# Patient Record
Sex: MALE | Race: WHITE | Employment: OTHER | URBAN - METROPOLITAN AREA
[De-identification: names, ages, dates, MRNs, and addresses within clinical notes are randomized per-mention and may not be internally consistent; named-entity substitution may affect disease eponyms.]

---

## 2017-03-27 ENCOUNTER — HOSPITAL ENCOUNTER (OUTPATIENT)
Dept: CT IMAGING | Age: 72
Discharge: HOME | DRG: 903 | End: 2017-03-27
Attending: INTERNAL MEDICINE
Payer: MEDICARE

## 2017-03-27 VITALS — HEIGHT: 68 IN | BODY MASS INDEX: 21.22 KG/M2 | WEIGHT: 140 LBS

## 2017-03-27 DIAGNOSIS — C20 RECTAL CANCER (HCC): ICD-10-CM

## 2017-03-27 RX ORDER — SODIUM CHLORIDE 0.9 % (FLUSH) 0.9 %
10 SYRINGE (ML) INJECTION
Status: COMPLETED | OUTPATIENT
Start: 2017-03-27 | End: 2017-03-27

## 2017-03-27 RX ADMIN — DIATRIZOATE MEGLUMINE AND DIATRIZOATE SODIUM 15 ML: 660; 100 LIQUID ORAL; RECTAL at 08:46

## 2017-03-27 RX ADMIN — SODIUM CHLORIDE 100 ML: 900 INJECTION, SOLUTION INTRAVENOUS at 08:46

## 2017-03-27 RX ADMIN — IOPAMIDOL 100 ML: 755 INJECTION, SOLUTION INTRAVENOUS at 08:46

## 2017-03-27 RX ADMIN — Medication 10 ML: at 08:45

## 2017-03-28 ENCOUNTER — HOSPITAL ENCOUNTER (OUTPATIENT)
Dept: LAB | Age: 72
Discharge: HOME | DRG: 903 | End: 2017-03-28
Payer: MEDICARE

## 2017-03-28 ENCOUNTER — ANESTHESIA EVENT (OUTPATIENT)
Dept: SURGERY | Age: 72
DRG: 903 | End: 2017-03-28
Payer: MEDICARE

## 2017-03-28 DIAGNOSIS — C20 RECTAL CANCER (HCC): ICD-10-CM

## 2017-03-28 LAB
ALBUMIN SERPL BCP-MCNC: 2.8 G/DL (ref 3.2–4.6)
ALBUMIN/GLOB SERPL: 0.7 {RATIO} (ref 1.2–3.5)
ALP SERPL-CCNC: 94 U/L (ref 50–136)
ALT SERPL-CCNC: 27 U/L (ref 12–65)
ANION GAP BLD CALC-SCNC: 9 MMOL/L (ref 7–16)
AST SERPL W P-5'-P-CCNC: 23 U/L (ref 15–37)
BASOPHILS # BLD AUTO: 0 K/UL (ref 0–0.2)
BASOPHILS # BLD: 1 % (ref 0–2)
BILIRUB SERPL-MCNC: 0.2 MG/DL (ref 0.2–1.1)
BUN SERPL-MCNC: 16 MG/DL (ref 8–23)
CALCIUM SERPL-MCNC: 9 MG/DL (ref 8.3–10.4)
CHLORIDE SERPL-SCNC: 103 MMOL/L (ref 98–107)
CO2 SERPL-SCNC: 26 MMOL/L (ref 23–32)
CREAT SERPL-MCNC: 0.88 MG/DL (ref 0.8–1.5)
DIFFERENTIAL METHOD BLD: ABNORMAL
EOSINOPHIL # BLD: 0.1 K/UL (ref 0–0.8)
EOSINOPHIL NFR BLD: 1 % (ref 0.5–7.8)
ERYTHROCYTE [DISTWIDTH] IN BLOOD BY AUTOMATED COUNT: 14.1 % (ref 11.9–14.6)
GLOBULIN SER CALC-MCNC: 4.2 G/DL (ref 2.3–3.5)
GLUCOSE SERPL-MCNC: 122 MG/DL (ref 65–100)
HCT VFR BLD AUTO: 37.8 % (ref 41.1–50.3)
HGB BLD-MCNC: 11.7 G/DL (ref 13.6–17.2)
LYMPHOCYTES # BLD AUTO: 7 % (ref 13–44)
LYMPHOCYTES # BLD: 0.3 K/UL (ref 0.5–4.6)
MCH RBC QN AUTO: 26.5 PG (ref 26.1–32.9)
MCHC RBC AUTO-ENTMCNC: 31 G/DL (ref 31.4–35)
MCV RBC AUTO: 85.7 FL (ref 79.6–97.8)
MONOCYTES # BLD: 0.6 K/UL (ref 0.1–1.3)
MONOCYTES NFR BLD AUTO: 12 % (ref 4–12)
NEUTS SEG # BLD: 4 K/UL (ref 1.7–8.2)
NEUTS SEG NFR BLD AUTO: 79 % (ref 43–78)
NRBC # BLD: 0 K/UL (ref 0–0.2)
PLATELET # BLD AUTO: 324 K/UL (ref 150–450)
PMV BLD AUTO: 8.3 FL (ref 10.8–14.1)
POTASSIUM SERPL-SCNC: 4.2 MMOL/L (ref 3.5–5.1)
PROT SERPL-MCNC: 7 G/DL (ref 6.3–8.2)
RBC # BLD AUTO: 4.41 M/UL (ref 4.23–5.67)
SODIUM SERPL-SCNC: 138 MMOL/L (ref 136–145)
WBC # BLD AUTO: 5 K/UL (ref 4.3–11.1)

## 2017-03-29 ENCOUNTER — HOSPITAL ENCOUNTER (OUTPATIENT)
Dept: SURGERY | Age: 72
Discharge: HOME | End: 2017-03-29
Attending: SURGERY

## 2017-03-29 VITALS
TEMPERATURE: 97.8 F | DIASTOLIC BLOOD PRESSURE: 72 MMHG | HEART RATE: 94 BPM | SYSTOLIC BLOOD PRESSURE: 118 MMHG | RESPIRATION RATE: 14 BRPM | OXYGEN SATURATION: 95 % | HEIGHT: 68 IN | WEIGHT: 141.5 LBS | BODY MASS INDEX: 21.44 KG/M2

## 2017-03-29 RX ORDER — TAMSULOSIN HYDROCHLORIDE 0.4 MG/1
0.4 CAPSULE ORAL EVERY EVENING
COMMUNITY
End: 2017-09-14

## 2017-03-29 NOTE — PERIOP NOTES
Patient verified name, , and surgery as listed in [facility]. Type II surgery, walk in assessment complete. Labs per surgeon: none  Labs per anesthesia protocol: hgb (pt with recent labs/results [3/28/17] in EMR)  EKG: not required per anesthesia protocol. Hibiclens and instructions given per hospital policy. Patient provided with handouts including Guide to Surgery, Pain Management, Hand Hygiene, Blood Transfusion Education, and [facility] Brochure. Patient answered medical/surgical history questions at their best of ability. All prior to admission medications documented in [facility]. Original medication prescription bottle not visualized during patient appointment. Patient instructed to hold all vitamins 7 days prior to surgery and NSAIDS 5 days prior to surgery, patient verbalized understanding. Medications to be held vitamins/iron    Patient instructed to continue previous medications as prescribed prior to surgery and to take the following medications the day of surgery according to anesthesia guidelines with a small sip of water: levothryoxine. Patient taught back and verbalized understanding.

## 2017-03-30 ENCOUNTER — HOSPITAL ENCOUNTER (INPATIENT)
Age: 72
LOS: 7 days | Discharge: HOME | DRG: 903 | End: 2017-04-06
Attending: SURGERY | Admitting: SURGERY
Payer: MEDICARE

## 2017-03-30 ENCOUNTER — ANESTHESIA (OUTPATIENT)
Dept: SURGERY | Age: 72
DRG: 903 | End: 2017-03-30
Payer: MEDICARE

## 2017-03-30 DIAGNOSIS — C20 RECTAL CANCER (HCC): Primary | ICD-10-CM

## 2017-03-30 DIAGNOSIS — T85.79XS INFECTED PROSTHETIC MESH OF ABDOMINAL WALL, SEQUELA: ICD-10-CM

## 2017-03-30 PROBLEM — T85.79XA: Status: ACTIVE | Noted: 2017-03-30

## 2017-03-30 PROCEDURE — 77030032490 HC SLV COMPR SCD KNE COVD -B

## 2017-03-30 PROCEDURE — 77030008477 HC STYL SATN SLP COVD -A: Performed by: ANESTHESIOLOGY

## 2017-03-30 PROCEDURE — 77030034849: Performed by: SURGERY

## 2017-03-30 PROCEDURE — 76060000037 HC ANESTHESIA 3 TO 3.5 HR: Performed by: SURGERY

## 2017-03-30 PROCEDURE — 77030013567 HC DRN WND RESERV BARD -A: Performed by: SURGERY

## 2017-03-30 PROCEDURE — 74011250636 HC RX REV CODE- 250/636

## 2017-03-30 PROCEDURE — 0WPF0JZ REMOVAL OF SYNTHETIC SUBSTITUTE FROM ABDOMINAL WALL, OPEN APPROACH: ICD-10-PCS | Performed by: SURGERY

## 2017-03-30 PROCEDURE — 74011250636 HC RX REV CODE- 250/636: Performed by: ANESTHESIOLOGY

## 2017-03-30 PROCEDURE — 77030008703 HC TU ET UNCUF COVD -A: Performed by: ANESTHESIOLOGY

## 2017-03-30 PROCEDURE — 74011250637 HC RX REV CODE- 250/637: Performed by: SURGERY

## 2017-03-30 PROCEDURE — 77030031139 HC SUT VCRL2 J&J -A: Performed by: SURGERY

## 2017-03-30 PROCEDURE — 77030002986 HC SUT PROL J&J -A: Performed by: SURGERY

## 2017-03-30 PROCEDURE — 77030018836 HC SOL IRR NACL ICUM -A: Performed by: SURGERY

## 2017-03-30 PROCEDURE — 77030036554: Performed by: SURGERY

## 2017-03-30 PROCEDURE — 77030012048 HC BG OST DRN W/F BMS -A: Performed by: SURGERY

## 2017-03-30 PROCEDURE — 74011250636 HC RX REV CODE- 250/636: Performed by: SURGERY

## 2017-03-30 PROCEDURE — 77030002916 HC SUT ETHLN J&J -A: Performed by: SURGERY

## 2017-03-30 PROCEDURE — 76010000133 HC OR TIME 3 TO 3.5 HR: Performed by: SURGERY

## 2017-03-30 PROCEDURE — 77030020782 HC GWN BAIR PAWS FLX 3M -B: Performed by: ANESTHESIOLOGY

## 2017-03-30 PROCEDURE — 74011250637 HC RX REV CODE- 250/637: Performed by: ANESTHESIOLOGY

## 2017-03-30 PROCEDURE — 0JB80ZZ EXCISION OF ABDOMEN SUBCUTANEOUS TISSUE AND FASCIA, OPEN APPROACH: ICD-10-PCS | Performed by: SURGERY

## 2017-03-30 PROCEDURE — 77030011640 HC PAD GRND REM COVD -A: Performed by: SURGERY

## 2017-03-30 PROCEDURE — 77030015424 HC RELD STPLR TA COVD -B: Performed by: SURGERY

## 2017-03-30 PROCEDURE — 77030032490 HC SLV COMPR SCD KNE COVD -B: Performed by: SURGERY

## 2017-03-30 PROCEDURE — 77030008467 HC STPLR SKN COVD -B: Performed by: SURGERY

## 2017-03-30 PROCEDURE — 76210000016 HC OR PH I REC 1 TO 1.5 HR: Performed by: SURGERY

## 2017-03-30 PROCEDURE — 74011000250 HC RX REV CODE- 250: Performed by: ANESTHESIOLOGY

## 2017-03-30 PROCEDURE — 0WUF0KZ SUPPLEMENT ABDOMINAL WALL WITH NONAUTOLOGOUS TISSUE SUBSTITUTE, OPEN APPROACH: ICD-10-PCS | Performed by: SURGERY

## 2017-03-30 PROCEDURE — 65270000029 HC RM PRIVATE

## 2017-03-30 PROCEDURE — 74011000250 HC RX REV CODE- 250

## 2017-03-30 PROCEDURE — 77030012414: Performed by: SURGERY

## 2017-03-30 RX ORDER — FENTANYL CITRATE 50 UG/ML
INJECTION, SOLUTION INTRAMUSCULAR; INTRAVENOUS AS NEEDED
Status: DISCONTINUED | OUTPATIENT
Start: 2017-03-30 | End: 2017-03-30 | Stop reason: HOSPADM

## 2017-03-30 RX ORDER — DIPHENHYDRAMINE HYDROCHLORIDE 50 MG/ML
12.5 INJECTION, SOLUTION INTRAMUSCULAR; INTRAVENOUS
Status: DISCONTINUED | OUTPATIENT
Start: 2017-03-30 | End: 2017-04-06 | Stop reason: HOSPADM

## 2017-03-30 RX ORDER — OXYCODONE HYDROCHLORIDE 5 MG/1
10 TABLET ORAL
Status: DISCONTINUED | OUTPATIENT
Start: 2017-03-30 | End: 2017-03-30 | Stop reason: HOSPADM

## 2017-03-30 RX ORDER — SODIUM CHLORIDE, SODIUM LACTATE, POTASSIUM CHLORIDE, CALCIUM CHLORIDE 600; 310; 30; 20 MG/100ML; MG/100ML; MG/100ML; MG/100ML
1000 INJECTION, SOLUTION INTRAVENOUS CONTINUOUS
Status: DISCONTINUED | OUTPATIENT
Start: 2017-03-30 | End: 2017-03-30 | Stop reason: HOSPADM

## 2017-03-30 RX ORDER — SODIUM CHLORIDE 0.9 % (FLUSH) 0.9 %
5-10 SYRINGE (ML) INJECTION AS NEEDED
Status: DISCONTINUED | OUTPATIENT
Start: 2017-03-30 | End: 2017-03-30 | Stop reason: HOSPADM

## 2017-03-30 RX ORDER — FENTANYL CITRATE 50 UG/ML
100 INJECTION, SOLUTION INTRAMUSCULAR; INTRAVENOUS AS NEEDED
Status: DISCONTINUED | OUTPATIENT
Start: 2017-03-30 | End: 2017-03-30 | Stop reason: HOSPADM

## 2017-03-30 RX ORDER — DIPHENHYDRAMINE HYDROCHLORIDE 50 MG/ML
12.5 INJECTION, SOLUTION INTRAMUSCULAR; INTRAVENOUS ONCE
Status: DISCONTINUED | OUTPATIENT
Start: 2017-03-30 | End: 2017-03-30 | Stop reason: HOSPADM

## 2017-03-30 RX ORDER — LIDOCAINE HYDROCHLORIDE 20 MG/ML
INJECTION, SOLUTION EPIDURAL; INFILTRATION; INTRACAUDAL; PERINEURAL AS NEEDED
Status: DISCONTINUED | OUTPATIENT
Start: 2017-03-30 | End: 2017-03-30 | Stop reason: HOSPADM

## 2017-03-30 RX ORDER — ONDANSETRON 2 MG/ML
4 INJECTION INTRAMUSCULAR; INTRAVENOUS ONCE
Status: DISCONTINUED | OUTPATIENT
Start: 2017-03-30 | End: 2017-03-30 | Stop reason: HOSPADM

## 2017-03-30 RX ORDER — FAMOTIDINE 10 MG/ML
20 INJECTION INTRAVENOUS ONCE
Status: DISCONTINUED | OUTPATIENT
Start: 2017-03-30 | End: 2017-03-30 | Stop reason: HOSPADM

## 2017-03-30 RX ORDER — SODIUM CHLORIDE 0.9 % (FLUSH) 0.9 %
5-10 SYRINGE (ML) INJECTION EVERY 8 HOURS
Status: DISCONTINUED | OUTPATIENT
Start: 2017-03-30 | End: 2017-04-06 | Stop reason: HOSPADM

## 2017-03-30 RX ORDER — AMITRIPTYLINE HYDROCHLORIDE 50 MG/1
100 TABLET, FILM COATED ORAL
Status: DISCONTINUED | OUTPATIENT
Start: 2017-03-30 | End: 2017-04-06 | Stop reason: HOSPADM

## 2017-03-30 RX ORDER — ACETAMINOPHEN 500 MG
500 TABLET ORAL ONCE
Status: DISCONTINUED | OUTPATIENT
Start: 2017-03-30 | End: 2017-03-30 | Stop reason: HOSPADM

## 2017-03-30 RX ORDER — ROCURONIUM BROMIDE 10 MG/ML
INJECTION, SOLUTION INTRAVENOUS AS NEEDED
Status: DISCONTINUED | OUTPATIENT
Start: 2017-03-30 | End: 2017-03-30 | Stop reason: HOSPADM

## 2017-03-30 RX ORDER — HYDROMORPHONE HYDROCHLORIDE 2 MG/ML
0.5 INJECTION, SOLUTION INTRAMUSCULAR; INTRAVENOUS; SUBCUTANEOUS
Status: DISCONTINUED | OUTPATIENT
Start: 2017-03-30 | End: 2017-03-30 | Stop reason: HOSPADM

## 2017-03-30 RX ORDER — OXYCODONE AND ACETAMINOPHEN 5; 325 MG/1; MG/1
1 TABLET ORAL AS NEEDED
Status: DISCONTINUED | OUTPATIENT
Start: 2017-03-30 | End: 2017-03-30 | Stop reason: HOSPADM

## 2017-03-30 RX ORDER — TAMSULOSIN HYDROCHLORIDE 0.4 MG/1
0.4 CAPSULE ORAL EVERY EVENING
Status: DISCONTINUED | OUTPATIENT
Start: 2017-03-30 | End: 2017-04-06 | Stop reason: HOSPADM

## 2017-03-30 RX ORDER — FAMOTIDINE 20 MG/1
20 TABLET, FILM COATED ORAL ONCE
Status: DISCONTINUED | OUTPATIENT
Start: 2017-03-30 | End: 2017-03-30 | Stop reason: HOSPADM

## 2017-03-30 RX ORDER — LIDOCAINE HYDROCHLORIDE 10 MG/ML
0.1 INJECTION INFILTRATION; PERINEURAL AS NEEDED
Status: DISCONTINUED | OUTPATIENT
Start: 2017-03-30 | End: 2017-03-30 | Stop reason: HOSPADM

## 2017-03-30 RX ORDER — NALOXONE HYDROCHLORIDE 0.4 MG/ML
0.2 INJECTION, SOLUTION INTRAMUSCULAR; INTRAVENOUS; SUBCUTANEOUS AS NEEDED
Status: DISCONTINUED | OUTPATIENT
Start: 2017-03-30 | End: 2017-03-30 | Stop reason: HOSPADM

## 2017-03-30 RX ORDER — HYDROMORPHONE HYDROCHLORIDE 1 MG/ML
1 INJECTION, SOLUTION INTRAMUSCULAR; INTRAVENOUS; SUBCUTANEOUS
Status: DISCONTINUED | OUTPATIENT
Start: 2017-03-30 | End: 2017-04-06 | Stop reason: HOSPADM

## 2017-03-30 RX ORDER — DEXAMETHASONE SODIUM PHOSPHATE 4 MG/ML
INJECTION, SOLUTION INTRA-ARTICULAR; INTRALESIONAL; INTRAMUSCULAR; INTRAVENOUS; SOFT TISSUE AS NEEDED
Status: DISCONTINUED | OUTPATIENT
Start: 2017-03-30 | End: 2017-03-30 | Stop reason: HOSPADM

## 2017-03-30 RX ORDER — FAMOTIDINE 20 MG/1
20 TABLET, FILM COATED ORAL ONCE
Status: COMPLETED | OUTPATIENT
Start: 2017-03-30 | End: 2017-03-30

## 2017-03-30 RX ORDER — ONDANSETRON 2 MG/ML
INJECTION INTRAMUSCULAR; INTRAVENOUS AS NEEDED
Status: DISCONTINUED | OUTPATIENT
Start: 2017-03-30 | End: 2017-03-30 | Stop reason: HOSPADM

## 2017-03-30 RX ORDER — SODIUM CHLORIDE 0.9 % (FLUSH) 0.9 %
5-10 SYRINGE (ML) INJECTION EVERY 8 HOURS
Status: DISCONTINUED | OUTPATIENT
Start: 2017-03-30 | End: 2017-03-30 | Stop reason: HOSPADM

## 2017-03-30 RX ORDER — HYDROMORPHONE HYDROCHLORIDE 1 MG/ML
2 INJECTION, SOLUTION INTRAMUSCULAR; INTRAVENOUS; SUBCUTANEOUS
Status: DISCONTINUED | OUTPATIENT
Start: 2017-03-30 | End: 2017-04-06 | Stop reason: HOSPADM

## 2017-03-30 RX ORDER — GLYCOPYRROLATE 0.2 MG/ML
INJECTION INTRAMUSCULAR; INTRAVENOUS AS NEEDED
Status: DISCONTINUED | OUTPATIENT
Start: 2017-03-30 | End: 2017-03-30 | Stop reason: HOSPADM

## 2017-03-30 RX ORDER — SODIUM CHLORIDE AND POTASSIUM CHLORIDE .9; .15 G/100ML; G/100ML
SOLUTION INTRAVENOUS CONTINUOUS
Status: DISCONTINUED | OUTPATIENT
Start: 2017-03-30 | End: 2017-04-06 | Stop reason: HOSPADM

## 2017-03-30 RX ORDER — SODIUM CHLORIDE 0.9 % (FLUSH) 0.9 %
5-10 SYRINGE (ML) INJECTION AS NEEDED
Status: DISCONTINUED | OUTPATIENT
Start: 2017-03-30 | End: 2017-04-06 | Stop reason: HOSPADM

## 2017-03-30 RX ORDER — HYDROMORPHONE HYDROCHLORIDE 1 MG/ML
0.5 INJECTION, SOLUTION INTRAMUSCULAR; INTRAVENOUS; SUBCUTANEOUS
Status: DISCONTINUED | OUTPATIENT
Start: 2017-03-30 | End: 2017-04-06 | Stop reason: HOSPADM

## 2017-03-30 RX ORDER — MIDAZOLAM HYDROCHLORIDE 1 MG/ML
2 INJECTION, SOLUTION INTRAMUSCULAR; INTRAVENOUS
Status: DISCONTINUED | OUTPATIENT
Start: 2017-03-30 | End: 2017-03-30 | Stop reason: HOSPADM

## 2017-03-30 RX ORDER — SODIUM CHLORIDE, SODIUM LACTATE, POTASSIUM CHLORIDE, CALCIUM CHLORIDE 600; 310; 30; 20 MG/100ML; MG/100ML; MG/100ML; MG/100ML
75 INJECTION, SOLUTION INTRAVENOUS CONTINUOUS
Status: DISCONTINUED | OUTPATIENT
Start: 2017-03-30 | End: 2017-03-30 | Stop reason: HOSPADM

## 2017-03-30 RX ORDER — SODIUM CHLORIDE, SODIUM LACTATE, POTASSIUM CHLORIDE, CALCIUM CHLORIDE 600; 310; 30; 20 MG/100ML; MG/100ML; MG/100ML; MG/100ML
75 INJECTION, SOLUTION INTRAVENOUS CONTINUOUS
Status: DISCONTINUED | OUTPATIENT
Start: 2017-03-30 | End: 2017-03-30 | Stop reason: SDUPTHER

## 2017-03-30 RX ORDER — NEOSTIGMINE METHYLSULFATE 1 MG/ML
INJECTION INTRAVENOUS AS NEEDED
Status: DISCONTINUED | OUTPATIENT
Start: 2017-03-30 | End: 2017-03-30 | Stop reason: HOSPADM

## 2017-03-30 RX ORDER — CEFAZOLIN SODIUM 1 G/3ML
2 INJECTION, POWDER, FOR SOLUTION INTRAMUSCULAR; INTRAVENOUS ONCE
Status: COMPLETED | OUTPATIENT
Start: 2017-03-30 | End: 2017-03-30

## 2017-03-30 RX ORDER — ENOXAPARIN SODIUM 100 MG/ML
40 INJECTION SUBCUTANEOUS DAILY
Status: DISCONTINUED | OUTPATIENT
Start: 2017-03-31 | End: 2017-04-06 | Stop reason: HOSPADM

## 2017-03-30 RX ORDER — OXYCODONE HYDROCHLORIDE 5 MG/1
5 TABLET ORAL
Status: DISCONTINUED | OUTPATIENT
Start: 2017-03-30 | End: 2017-03-30 | Stop reason: HOSPADM

## 2017-03-30 RX ORDER — ONDANSETRON 2 MG/ML
4 INJECTION INTRAMUSCULAR; INTRAVENOUS
Status: DISCONTINUED | OUTPATIENT
Start: 2017-03-30 | End: 2017-04-06 | Stop reason: HOSPADM

## 2017-03-30 RX ORDER — CEFAZOLIN SODIUM IN 0.9 % NACL 2 G/50 ML
2 INTRAVENOUS SOLUTION, PIGGYBACK (ML) INTRAVENOUS ONCE
Status: COMPLETED | OUTPATIENT
Start: 2017-03-30 | End: 2017-03-30

## 2017-03-30 RX ORDER — NALOXONE HYDROCHLORIDE 0.4 MG/ML
0.1 INJECTION, SOLUTION INTRAMUSCULAR; INTRAVENOUS; SUBCUTANEOUS AS NEEDED
Status: DISCONTINUED | OUTPATIENT
Start: 2017-03-30 | End: 2017-03-30 | Stop reason: HOSPADM

## 2017-03-30 RX ORDER — PROPOFOL 10 MG/ML
INJECTION, EMULSION INTRAVENOUS AS NEEDED
Status: DISCONTINUED | OUTPATIENT
Start: 2017-03-30 | End: 2017-03-30 | Stop reason: HOSPADM

## 2017-03-30 RX ORDER — CLONAZEPAM 1 MG/1
4 TABLET ORAL
Status: DISCONTINUED | OUTPATIENT
Start: 2017-03-30 | End: 2017-04-06 | Stop reason: HOSPADM

## 2017-03-30 RX ADMIN — FENTANYL CITRATE 50 MCG: 50 INJECTION, SOLUTION INTRAMUSCULAR; INTRAVENOUS at 15:35

## 2017-03-30 RX ADMIN — AMITRIPTYLINE HYDROCHLORIDE 100 MG: 50 TABLET, FILM COATED ORAL at 22:51

## 2017-03-30 RX ADMIN — CEFAZOLIN 2 G: 1 INJECTION, POWDER, FOR SOLUTION INTRAMUSCULAR; INTRAVENOUS; PARENTERAL at 18:17

## 2017-03-30 RX ADMIN — SODIUM CHLORIDE, SODIUM LACTATE, POTASSIUM CHLORIDE, AND CALCIUM CHLORIDE: 600; 310; 30; 20 INJECTION, SOLUTION INTRAVENOUS at 15:50

## 2017-03-30 RX ADMIN — ROCURONIUM BROMIDE 50 MG: 10 INJECTION, SOLUTION INTRAVENOUS at 15:35

## 2017-03-30 RX ADMIN — FENTANYL CITRATE 50 MCG: 50 INJECTION, SOLUTION INTRAMUSCULAR; INTRAVENOUS at 17:14

## 2017-03-30 RX ADMIN — HYDROMORPHONE HYDROCHLORIDE 0.5 MG: 2 INJECTION, SOLUTION INTRAMUSCULAR; INTRAVENOUS; SUBCUTANEOUS at 19:00

## 2017-03-30 RX ADMIN — LIDOCAINE HYDROCHLORIDE 0.1 ML: 10 INJECTION, SOLUTION INFILTRATION; PERINEURAL at 12:19

## 2017-03-30 RX ADMIN — TAMSULOSIN HYDROCHLORIDE 0.4 MG: 0.4 CAPSULE ORAL at 20:52

## 2017-03-30 RX ADMIN — ONDANSETRON 4 MG: 2 INJECTION INTRAMUSCULAR; INTRAVENOUS at 18:20

## 2017-03-30 RX ADMIN — ROCURONIUM BROMIDE 10 MG: 10 INJECTION, SOLUTION INTRAVENOUS at 16:41

## 2017-03-30 RX ADMIN — NEOSTIGMINE METHYLSULFATE 3 MG: 1 INJECTION INTRAVENOUS at 18:20

## 2017-03-30 RX ADMIN — SODIUM CHLORIDE AND POTASSIUM CHLORIDE: 9; 1.49 INJECTION, SOLUTION INTRAVENOUS at 20:52

## 2017-03-30 RX ADMIN — LIDOCAINE HYDROCHLORIDE 100 MG: 20 INJECTION, SOLUTION EPIDURAL; INFILTRATION; INTRACAUDAL; PERINEURAL at 15:35

## 2017-03-30 RX ADMIN — SODIUM CHLORIDE, SODIUM LACTATE, POTASSIUM CHLORIDE, AND CALCIUM CHLORIDE 1000 ML: 600; 310; 30; 20 INJECTION, SOLUTION INTRAVENOUS at 12:17

## 2017-03-30 RX ADMIN — ROCURONIUM BROMIDE 10 MG: 10 INJECTION, SOLUTION INTRAVENOUS at 16:53

## 2017-03-30 RX ADMIN — CEFAZOLIN 2 G: 1 INJECTION, POWDER, FOR SOLUTION INTRAMUSCULAR; INTRAVENOUS; PARENTERAL at 15:20

## 2017-03-30 RX ADMIN — DEXAMETHASONE SODIUM PHOSPHATE 8 MG: 4 INJECTION, SOLUTION INTRA-ARTICULAR; INTRALESIONAL; INTRAMUSCULAR; INTRAVENOUS; SOFT TISSUE at 15:46

## 2017-03-30 RX ADMIN — HYDROMORPHONE HYDROCHLORIDE 0.5 MG: 2 INJECTION, SOLUTION INTRAMUSCULAR; INTRAVENOUS; SUBCUTANEOUS at 18:50

## 2017-03-30 RX ADMIN — HYDROMORPHONE HYDROCHLORIDE 0.5 MG: 2 INJECTION, SOLUTION INTRAMUSCULAR; INTRAVENOUS; SUBCUTANEOUS at 18:55

## 2017-03-30 RX ADMIN — FENTANYL CITRATE 50 MCG: 50 INJECTION, SOLUTION INTRAMUSCULAR; INTRAVENOUS at 16:54

## 2017-03-30 RX ADMIN — GLYCOPYRROLATE 0.4 MG: 0.2 INJECTION INTRAMUSCULAR; INTRAVENOUS at 18:20

## 2017-03-30 RX ADMIN — PROPOFOL 200 MG: 10 INJECTION, EMULSION INTRAVENOUS at 15:35

## 2017-03-30 RX ADMIN — LIDOCAINE HYDROCHLORIDE 60 MG: 20 INJECTION, SOLUTION EPIDURAL; INFILTRATION; INTRACAUDAL; PERINEURAL at 18:29

## 2017-03-30 RX ADMIN — HYDROMORPHONE HYDROCHLORIDE 0.5 MG: 2 INJECTION, SOLUTION INTRAMUSCULAR; INTRAVENOUS; SUBCUTANEOUS at 18:45

## 2017-03-30 RX ADMIN — FENTANYL CITRATE 50 MCG: 50 INJECTION, SOLUTION INTRAMUSCULAR; INTRAVENOUS at 15:32

## 2017-03-30 RX ADMIN — FAMOTIDINE 20 MG: 20 TABLET ORAL at 12:05

## 2017-03-30 RX ADMIN — CLONAZEPAM 4 MG: 1 TABLET ORAL at 22:11

## 2017-03-30 NOTE — ANESTHESIA PREPROCEDURE EVALUATION
Anesthetic History   No history of anesthetic complications            Review of Systems / Medical History  Patient summary reviewed and pertinent labs reviewed    Pulmonary  Within defined limits                 Neuro/Psych              Cardiovascular                  Exercise tolerance: >4 METS     GI/Hepatic/Renal  Within defined limits              Endo/Other      Hypothyroidism: well controlled  Arthritis and cancer (rectal)     Other Findings   Comments:  Insomnia  Colonic polyps  Anxiety  Fecal incontinence           Physical Exam    Airway  Mallampati: II  TM Distance: 4 - 6 cm  Neck ROM: normal range of motion   Mouth opening: Normal     Cardiovascular  Regular rate and rhythm,  S1 and S2 normal,  no murmur, click, rub, or gallop  Rhythm: regular  Rate: normal         Dental    Dentition: Caps/crowns     Pulmonary  Breath sounds clear to auscultation               Abdominal  GI exam deferred       Other Findings            Anesthetic Plan    ASA: 3  Anesthesia type: general          Induction: Intravenous  Anesthetic plan and risks discussed with: Patient

## 2017-03-30 NOTE — H&P (VIEW-ONLY)
Citlalli Cruz MD, [facility], [address] [place] [address]  Javy Miramontes  Phone [phone]   Fax [phone]      Date of visit: 3/28/2017     Primary/Requesting provider: Susana Friday,     Chief Complaint   Patient presents with    Follow-up            HPI  Patient is a 66 y.o. male who presents for discussion of surgical options. He continues to care for his exposed mesh, but has reached a point of chronic frustration. He is not having any new problems- denies nausea, vomiting, fever. No changes with ostomy- he still deals with retraction and wants to know if it can be revised. He is trying to stay active.     Medications:   Home meds reviewed    Allergies: No Known Allergies     Past History:  Past Medical History:   Diagnosis Date    Family history of malignant neoplasm of gastrointestinal tract     mother colon/ovarian cancer 67    Fecal incontinence     LAR syndrome    Former cigarette smoker     History of rectal cancer     Hypothyroid     stable w/med    Infection and inflammatory reaction due to other internal prosthetic devices, implants and grafts, subsequent encounter 2017    abd wound/mesh colostomy    Insomnia     takes meds    Osteoarthritis, hand     Personal history of colonic polyps 9/2013    x 1    Personal history of malignant neoplasm of rectum, rectosigmoid junction, and anus 9/2013    Psychiatric disorder     anxiety      Past Surgical History:   Procedure Laterality Date    ENDOSCOPY, COLON, DIAGNOSTIC  last 2/3/15    Emily--no polyps--3 year recall    HX COLECTOMY  11/2013    Emily--lap LAR with coloproctostomy, mobilization splenic flexure, diverting loop ileostomy    HX COLECTOMY Right 8/2014    for leak    HX COLOSTOMY  5/11/16    HX GI  4/2016    Sacral nerve stimlator lead trial (unsucessful) and removal    HX HERNIA REPAIR  3/2015, 5/2016    HX HERNIA REPAIR      and Colostomy in May    HX OTHER SURGICAL  10/7/2013 Emily---endorectal us    HX POLYPECTOMY      HX VASCULAR ACCESS Left 4/14    single lumen port/subsequently removed        Family and Social History:  Family History   Problem Relation Age of Onset    Cancer Mother      colon and ovarian    Cancer Brother      prostate    Alcohol abuse Brother     Cancer Maternal Grandmother      bone    Alcohol abuse Father     Alcohol abuse Sister     Stroke Paternal Grandfather      Social History     Social History    Marital status: SINGLE     Spouse name: N/A    Number of children: N/A    Years of education: N/A     Occupational History    Not on file. Social History Main Topics    Smoking status: Former Smoker     Quit date: 11/16/1963    Smokeless tobacco: Never Used    Alcohol use 7.0 oz/week     14 Cans of beer per week      Comment: advised stop drinking given issues    Drug use: No    Sexual activity: No     Other Topics Concern    Not on file     Social History Narrative       Review of Systems   Constitutional: Negative for chills, diaphoresis, fever, malaise/fatigue and weight loss. HENT: Negative for congestion, ear pain, hearing loss, nosebleeds and sore throat. Eyes: Negative for blurred vision and pain. Glasses   Respiratory: Negative for cough, shortness of breath and wheezing. Cardiovascular: Negative for chest pain, palpitations and leg swelling. Gastrointestinal: Negative for abdominal pain, blood in stool, constipation, diarrhea, heartburn, melena, nausea and vomiting. Genitourinary: Negative for dysuria, frequency and urgency. Musculoskeletal: Negative for back pain, joint pain, myalgias and neck pain. Skin: Negative for itching and rash. Neurological: Negative for dizziness, tingling, tremors, sensory change, focal weakness, seizures, loss of consciousness, weakness and headaches. Endo/Heme/Allergies: Negative for environmental allergies. Does not bruise/bleed easily.    Psychiatric/Behavioral: Negative for depression. The patient is nervous/anxious and has insomnia. Physical Exam   Constitutional: He appears well-developed and well-nourished. He is cooperative. Non-toxic appearance. HENT:   Head: Normocephalic and atraumatic. Mouth/Throat: Oropharynx is clear and moist.   Eyes: Conjunctivae and EOM are normal. Pupils are equal, round, and reactive to light. No scleral icterus. Neck: Normal range of motion. No JVD present. No tracheal deviation present. No thyromegaly present. Cardiovascular: Normal rate and regular rhythm. Exam reveals no gallop and no friction rub. No murmur heard. Pulmonary/Chest: Effort normal and breath sounds normal. No respiratory distress. He has no wheezes. He has no rales. Abdominal: Soft. Ostomy appliance in place  abd wound with 4 foci of mesh exposure, most cephalad nearly granulated. Total area is approximately 24c93yr. No significant undermining. Narrow skin bridge between lowest 2 zones is erythematous c/w ischemia. Musculoskeletal:   No gross deformities   Neurological: He is alert. No cranial nerve deficit. Skin: Skin is warm. He is not diaphoretic. Psychiatric: He has a normal mood and affect. Vitals reviewed. ASSESSMENT and PLAN  Encounter Diagnoses   Name Primary?  Infection of implant, subsequent encounter Yes     Mr Ruel Castaneda has a very complex problem, of which he is well aware, and he has a realistic attitude about it. He is tired of the wound care and would like some sort of abdominal closure. He is not interested in referral to a hernia center. Mesh removal will leave a very large defect. Given his slight body habitus, it is unlikely that fascia can be reapproximated. He has previously undergone bilateral component separation and I do not think that re-mobilization of his attenuated rectus, in the face of infected mesh, is appropriate.   I think his best option is mesh removal with bridging via a biologic implant, with coverage by a local skin/subcutaneous flap in addition to whatever skin closure can be achieved primarily. There is significant risk of failure with this, with resultant open abdomen, which could require prolonged hospitalization and prolonged bedrest.  At best, this would provide skin/scar coverage of the abdominal cavity, with a large hernia; he feels this is preferable to his current situation. I do not advise any planned revision of his colostomy due to potential risk of devascularization as well as wound contamination. He understands the above procedure(s) carry significant risk of bleeding, infection, and tissue loss resulting in wound failure with need for further intervention.

## 2017-03-30 NOTE — IP AVS SNAPSHOT
[address] 
[phone] Patient: Ryan Porras MRN: [id number] You are allergic to the following No active allergies Recent Documentation Height Weight BMI Smoking Status 1.727 m 64.2 kg 21.52 kg/m2 Former Smoker Emergency Contacts Name Discharge Info Relation Home Work Mobile [facility] CENTRAL DISCHARGE CAREGIVER [3] Friend [5] [phone] Chay 5 CAREGIVER [3] Sister [23] [phone] About your hospitalization You were admitted on:  2017 You last received care in the:  [facility] You were discharged on:  2017 Unit phone number:  [phone] Why you were hospitalized Your primary diagnosis was:  Not on File Your diagnoses also included:  Infection Of Implant (Hcc) Providers Seen During Your Hospitalizations Provider Role Specialty Primary office phone Marvine Rubinstein, MD Attending Provider General Surgery [phone] Your Primary Care Physician (PCP) Primary Care Physician Office Phone Office Fax Beck Holm [phone] Follow-up Information Follow up With Details Comments Contact Info Salazar Postal, DO   [address] [facility] [id number] [phone] Your Appointments 2017  9:45 AM EDT Follow Up with Marvine Rubinstein, MD  
[address]) [address] [place] [id number] [phone] Wednesday May 17, 2017 10:15 AM EDT Follow Up with Delicia Olson MD  
[address] [address] [phone] Current Discharge Medication List  
  
START taking these medications Dose & Instructions Dispensing Information Comments Morning Noon Evening Bedtime HYDROcodone-acetaminophen 5-325 mg per tablet Commonly known as:  Desi Proper Take 1-2 tabs po Q4-6hrs prn pain Quantity:  20 Tab Refills:  0  
     
   
   
   
  
 trimethoprim-sulfamethoxazole 160-800 mg per tablet Commonly known as:  BACTRIM DS, SEPTRA DS Your next dose is: Today Dose:  1 Tab Take 1 Tab by mouth every twelve (12) hours for 14 days. Quantity:  28 Tab Refills:  0 CONTINUE these medications which have CHANGED Dose & Instructions Dispensing Information Comments Morning Noon Evening Bedtime  
 clonazePAM 2 mg tablet Commonly known as:  Haylie Medici What changed:  additional instructions Dose:  4 mg Take 2 Tabs by mouth nightly. Max Daily Amount: 4 mg. Indications: takes for sleep Quantity:  30 Tab Refills:  0  
     
   
   
   
  
 levothyroxine 125 mcg tablet Commonly known as:  SYNTHROID What changed:  additional instructions Your next dose is:  Tomorrow Dose:  125 mcg Take 1 Tab by mouth Daily (before breakfast). Indications: hypothyroidism Quantity:  30 Tab Refills:  11 CONTINUE these medications which have NOT CHANGED Dose & Instructions Dispensing Information Comments Morning Noon Evening Bedtime  
 amitriptyline 100 mg tablet Commonly known as:  ELAVIL Your next dose is:  Tomorrow Dose:  100 mg Take 100 mg by mouth nightly. Dr Castellanos Bi psych  Indications: takes for sleep Refills:  0 CENTRATEX 106 mg iron- 1 mg Cap Generic drug:  Iron-Folic Acid-Mv, Min OCC#90 Your next dose is:  Tomorrow TAKE ONE CAPSULE BY MOUTH ONE TIME DAILY Refills:  11 FLOMAX 0.4 mg capsule Generic drug:  tamsulosin Your next dose is:  Tomorrow  Dose:  0.4 mg  
 Take 0.4 mg by mouth every evening. Indications: pt reports he takes med after surgery per urologist recommendation Refills:  0  
     
   
   
   
  
 mupirocin 2 % ointment Commonly known as:  Tok3n Your next dose is: Today Apply  to affected area daily. Quantity:  22 g Refills:  11 Where to Get Your Medications Information on where to get these meds will be given to you by the nurse or doctor. ! Ask your nurse or doctor about these medications HYDROcodone-acetaminophen 5-325 mg per tablet  
 trimethoprim-sulfamethoxazole 160-800 mg per tablet Discharge Instructions * Follow-up Care/Patient Instructions: Activity: No heavy lifting for several weeks Diet: Regular Diet Wound Care: None needed and monitor character of drain output (volume out not important)- call if becomes cloudy DISCHARGE SUMMARY from Nurse The following personal items are in your possession at time of discharge: 
 
Dental Appliances: None Visual Aid: None PATIENT INSTRUCTIONS: 
 
After general anesthesia or intravenous sedation, for 24 hours or while taking prescription Narcotics: · Limit your activities · Do not drive and operate hazardous machinery · Do not make important personal or business decisions · Do  not drink alcoholic beverages · If you have not urinated within 8 hours after discharge, please contact your surgeon on call. Report the following to your surgeon: 
· Excessive pain, swelling, redness or odor of or around the surgical area · Temperature over 100.5 · Nausea and vomiting lasting longer than 4 hours or if unable to take medications · Any signs of decreased circulation or nerve impairment to extremity: change in color, persistent  numbness, tingling, coldness or increase pain · Any questions What to do at Home: 
Recommended activity: Activity as tolerated, per MD 
 
 If you experience any of the following symptoms fever>101, pain unrelieved with medication, nausea/vomiting, shortness of breath, dizziness/fainting, chest pain. , please follow up with your doctor. *  Please give a list of your current medications to your Primary Care Provider. *  Please update this list whenever your medications are discontinued, doses are 
    changed, or new medications (including over-the-counter products) are added. *  Please carry medication information at all times in case of emergency situations. These are general instructions for a healthy lifestyle: No smoking/ No tobacco products/ Avoid exposure to second hand smoke Surgeon General's Warning:  Quitting smoking now greatly reduces serious risk to your health. Obesity, smoking, and sedentary lifestyle greatly increases your risk for illness A healthy diet, regular physical exercise & weight monitoring are important for maintaining a healthy lifestyle You may be retaining fluid if you have a history of heart failure or if you experience any of the following symptoms:  Weight gain of 3 pounds or more overnight or 5 pounds in a week, increased swelling in our hands or feet or shortness of breath while lying flat in bed. Please call your doctor as soon as you notice any of these symptoms; do not wait until your next office visit. Recognize signs and symptoms of STROKE: 
 
F-face looks uneven A-arms unable to move or move unevenly S-speech slurred or non-existent T-time-call 911 as soon as signs and symptoms begin-DO NOT go Back to bed or wait to see if you get better-TIME IS BRAIN. Warning Signs of HEART ATTACK Call 911 if you have these symptoms: 
? Chest discomfort. Most heart attacks involve discomfort in the center of the chest that lasts more than a few minutes, or that goes away and comes back. It can feel like uncomfortable pressure, squeezing, fullness, or pain. ? Discomfort in other areas of the upper body. Symptoms can include pain or discomfort in one or both arms, the back, neck, jaw, or stomach. ? Shortness of breath with or without chest discomfort. ? Other signs may include breaking out in a cold sweat, nausea, or lightheadedness. Don't wait more than five minutes to call [address]! Fast action can save your life. Calling 911 is almost always the fastest way to get lifesaving treatment. Emergency Medical Services staff can begin treatment when they arrive  up to an hour sooner than if someone gets to the hospital by car. The discharge information has been reviewed with the patient. The patient verbalized understanding. Discharge medications reviewed with the patient and appropriate educational materials and side effects teaching were provided. Surgical Drain Care: Care Instructions What is a surgical drain? After a surgery, fluid may collect inside your body in the surgical area. This makes an infection or other problems more likely. A surgical drain allows the fluid to flow out. The doctor will put a thin rubber tube into the area of your body where the fluid is likely to collect. The rubber tube will carry the fluid outside your body. The most common type of surgical drain carries the fluid into a collection bulb that you empty. This is called a Xavier-Schaefer drain. The drain uses suction created by the bulb to pull the fluid from your body into the bulb. The rubber tube will probably be held in place by one or two stitches in your skin. Most people attach the bulb with a safety pin to clothing or near the bandage so that it doesn't flip around or pull on the stitches. When you first get the drain, the fluid will be bloody. It will change color from red to pink to a light yellow or clear as the wound heals and the fluid starts to go away.  
Your doctor may give you specific information on when you no longer need the drain and when it will be removed. In general, you will need the drain until you are collecting less than about 2 tablespoons of fluid in 24 hours. Follow-up care is a key part of your treatment and safety. Be sure to make and go to all appointments, and call your doctor if you are having problems. It's also a good idea to know your test results and keep a list of the medicines you take. How can you care for yourself at home? Fluid collection Follow any instructions your doctor gives you. How often you empty the bulb depends on how much fluid is draining. Empty the bulb when it is half full. To empty the bulb: 
· Wash your hands with soap and water. · Take the plug out of the bulb. · Empty the bulb. If your doctor asks you to measure the fluid, empty the fluid into a measuring cup, and write down how much you collected. · Clean the plug with alcohol. · Squeeze the bulb until it is flat. This removes all the air from the bulb. You may need to put the bulb on a table or a counter to flatten it. · Keep the bulb flat and put the plug in. · The bulb should stay flat after you put the plug back in. This creates the suction that pulls the fluid into the bulb. · Empty the fluid into the toilet. · Wash your hands. Bandage care You may have a bandage. Your doctor will tell you how often to change it. · Wash your hands with soap and water. · Take off the bandage from around the drain. · Clean the drain site and the skin around it with soap and water. Use gauze or a cotton swab. · When the site is dry, put on a new bandage. Drain care Squeezing or \"milking\" the tube can help prevent clogs so that it drains correctly. Your doctor will tell you when you need to do this. In general, you do this when: 
· You see a clot in the tube that is preventing fluid from draining. The clot may look like a dark, stringy lining. · You see fluid leaking around the tube where it goes into the skin. · You think there is no suction in the drain. To milk the tube: · Use one hand to hold and pinch the tube where it leaves the skin. · With the other hand, pinch the tube with your thumb and first finger just below where you're holding it. · Slowly and firmly push your thumb and first finger down the tubing toward the bulb. · Do this as many times as you need to. The clot should move down the tube and into the bulb. When should you call for help? Call your doctor now or seek immediate medical care if: 
· You have signs of infection, such as: 
¨ Increased pain, swelling, warmth, or redness around the area. ¨ Red streaks leading from the area. ¨ Pus draining from the area. ¨ A fever. · You see a sudden change in the color or smell of the drainage. · The tube is coming loose where it leaves your skin. Watch closely for changes in your health, and be sure to contact your doctor if: 
· You see a lot of fluid around the drain. · You cannot remove a clot from the tube by milking the tube. Where can you learn more? Go to http://randall-cee.info/. Enter K117 in the search box to learn more about \"Surgical Drain Care: Care Instructions. \" Current as of: May 27, 2016 Content Version: 11.2 © 3001-5248 [employer]. Care instructions adapted under license by [employer] (which disclaims liability or warranty for this information). If you have questions about a medical condition or this instruction, always ask your healthcare professional. [place] [address] any warranty or liability for your use of this information. Discharge Orders None ACO Transitions of Care Introducing [employer] [address] Latoya Coats offers a voluntary care coordination program to provide high quality service and care to [facility] fee-for-service beneficiaries. Ayaz Rachel was designed to help you enhance your health and well-being through the following services: ? Transitions of Care  support for individuals who are transitioning from one care setting to another (example: Hospital to home). ? Chronic and Complex Care Coordination  support for individuals and caregivers of those with serious or chronic illnesses or with more than one chronic (ongoing) condition and those who take a number of different medications. If you meet specific medical criteria, a [address] [place] [address] Rd may call you directly to coordinate your care with your primary care physician and your other care providers. For questions about the [facility] programs, please, contact your physicians office. For general questions or additional information about Accountable Care Organizations: 
Please visit www.medicare.gov/acos. html or call 1-800-MEDICARE (5-514.961.4411) TTY users should call 3-850.559.3921. [employer] Announcement We are excited to announce that we are making your provider's discharge notes available to you in [employer]. You will see these notes when they are completed and signed by the physician that discharged you from your recent hospital stay. If you have any questions or concerns about any information you see in [employer], please call the Health Information Department where you were seen or reach out to your Primary Care Provider for more information about your plan of care. Introducing [facility]! Dear Duglas Cervantes: Thank you for requesting a [employer] account. Our records indicate that you already have an active [employer] account. You can access your account anytime at [email or web address] Did you know that you can access your hospital and ER discharge instructions at any time in [employer]? You can also review all of your test results from your hospital stay or ER visit. Additional Information If you have questions, please visit the Frequently Asked Questions section of the [employer] website at [email or web address] Remember, MyChart is NOT to be used for urgent needs. For medical emergencies, dial 911. Now available from your iPhone and Android! General Information Please provide this summary of care documentation to your next provider. Patient Signature:  ____________________________________________________________ Date:  ____________________________________________________________  
  
Karis Cons Provider Signature:  ____________________________________________________________ Date:  ____________________________________________________________

## 2017-03-30 NOTE — BRIEF OP NOTE
BRIEF OPERATIVE NOTE    Date of Procedure: 3/30/2017   Preoperative Diagnosis: Infection of implant, subsequent encounter [T85.79XD]  Postoperative Diagnosis: Infection of implant, subsequent encounter [T85.79XD]    Procedure(s):  REMOVAL OF INFECTED MESH/ INCISIONAL HERNIA REPAIR WITH MESH ( ALLODERM 18X20) /DEBRIDEMENT OF SKIN AND SUBCUTANEOUS TISSUE    Surgeon(s) and Role:     * Gabriella Chairez MD - Primary            Surgical Staff:  Circ-1: Anjel Jennings RN  Circ-Relief: Judah Torres RN; Azeb Burnett RN  Scrub Tech-1: Kwasi Burnette  Scrub Tech-2: Jayjay Rosario  Scrub Tech-Relief: Belinda Sanchez  Event Time In   Incision Start 1543   Incision Close 1827     Anesthesia: General   Estimated Blood Loss: per anesth. Specimens: * No specimens in log *   Findings: infected mesh, dense adhesions to mesh. Single enterotomy closed with TA stapler  Complications: none  Implants:   Implant Name Type Inv.  Item Serial No.  Lot No. LRB No. Used Action   ALLODERM     [id number] [id number] N/A 1 Implanted

## 2017-03-30 NOTE — PROGRESS NOTES
TRANSFER - IN REPORT:    Verbal report received from [facility], [facility] (name) on Kev Figueredo  being received from PACU (unit) for routine post - op      Report consisted of patients Situation, Background, Assessment and   Recommendations(SBAR). Information from the following report(s) SBAR, Kardex, Intake/Output, MAR and Recent Results was reviewed with the receiving nurse. Opportunity for questions and clarification was provided. Assessment completed upon patients arrival to unit and care assumed.

## 2017-03-30 NOTE — ANESTHESIA POSTPROCEDURE EVALUATION
Post-Anesthesia Evaluation and Assessment    Patient: Anjum Platt MRN: [id number]  SSN: [id number]    YOB: 1945  Age: 66 y.o. Sex: male       Cardiovascular Function/Vital Signs  Visit Vitals    /66    Pulse 90    Temp 36.4 °C (97.6 °F)    Resp 18    Ht 5' 8\" (1.727 m)    Wt 64.2 kg (141 lb 8 oz)    SpO2 100%    BMI 21.52 kg/m2       Patient is status post general anesthesia for Procedure(s):  REMOVAL OF INFECTED MESH/ INCISIONAL HERNIA REPAIR WITH MESH ( ALLODERM 18X20) /DEBRIDEMENT OF SKIN AND SUBCUTANEOUS TISSUE  . Nausea/Vomiting: None    Postoperative hydration reviewed and adequate. Pain:  Pain Scale 1: Numeric (0 - 10) (03/30/17 1842)  Pain Intensity 1: 10 (03/30/17 1842)   Managed    Neurological Status:   Neuro (WDL): Exceptions to WDL (03/30/17 1842)  Neuro  Neurologic State: Drowsy (03/30/17 1842)  Orientation Level: Oriented to person;Oriented to place;Oriented to situation (03/30/17 1842)  Cognition: Follows commands (03/30/17 1842)  Speech: Clear (03/30/17 1842)  LUE Motor Response: Purposeful (03/30/17 1842)  LLE Motor Response: Purposeful (03/30/17 1842)  RUE Motor Response: Purposeful (03/30/17 1842)  RLE Motor Response: Purposeful (03/30/17 1842)   At baseline    Mental Status and Level of Consciousness: Arousable    Pulmonary Status:   O2 Device: Nasal cannula (03/30/17 1842)   Adequate oxygenation and airway patent    Complications related to anesthesia: None    Post-anesthesia assessment completed.  No concerns    Signed By: Bryan Yen MD     March 30, 2017

## 2017-03-30 NOTE — INTERVAL H&P NOTE
H&P Update:  Akshat Davis was seen and examined. History and physical has been reviewed. The patient has been examined.  There have been no significant clinical changes since the completion of the originally dated History and Physical.    Signed By: Eliseo Severe, MD     March 30, 2017 3:07 PM

## 2017-03-31 PROCEDURE — 65270000029 HC RM PRIVATE

## 2017-03-31 PROCEDURE — 77030011641 HC PASTE OST ADH BMS -A

## 2017-03-31 PROCEDURE — 94760 N-INVAS EAR/PLS OXIMETRY 1: CPT

## 2017-03-31 PROCEDURE — 77030010541

## 2017-03-31 PROCEDURE — 74011250637 HC RX REV CODE- 250/637: Performed by: SURGERY

## 2017-03-31 PROCEDURE — 74011250636 HC RX REV CODE- 250/636: Performed by: SURGERY

## 2017-03-31 PROCEDURE — 77030010522

## 2017-03-31 RX ORDER — OXYCODONE HYDROCHLORIDE 5 MG/1
15 TABLET ORAL
Status: DISCONTINUED | OUTPATIENT
Start: 2017-03-31 | End: 2017-04-06 | Stop reason: HOSPADM

## 2017-03-31 RX ORDER — OXYCODONE HYDROCHLORIDE 5 MG/1
10 TABLET ORAL
Status: DISCONTINUED | OUTPATIENT
Start: 2017-03-31 | End: 2017-04-06 | Stop reason: HOSPADM

## 2017-03-31 RX ORDER — OXYCODONE HYDROCHLORIDE 5 MG/1
5 TABLET ORAL
Status: DISCONTINUED | OUTPATIENT
Start: 2017-03-31 | End: 2017-04-06 | Stop reason: HOSPADM

## 2017-03-31 RX ORDER — ACETAMINOPHEN 500 MG
1000 TABLET ORAL
Status: DISCONTINUED | OUTPATIENT
Start: 2017-03-31 | End: 2017-04-06 | Stop reason: HOSPADM

## 2017-03-31 RX ADMIN — CLONAZEPAM 4 MG: 1 TABLET ORAL at 22:00

## 2017-03-31 RX ADMIN — Medication 5 ML: at 14:00

## 2017-03-31 RX ADMIN — AMITRIPTYLINE HYDROCHLORIDE 100 MG: 50 TABLET, FILM COATED ORAL at 23:00

## 2017-03-31 RX ADMIN — LEVOTHYROXINE SODIUM 125 MCG: 75 TABLET ORAL at 08:12

## 2017-03-31 RX ADMIN — ACETAMINOPHEN 1000 MG: 500 TABLET, FILM COATED ORAL at 16:50

## 2017-03-31 RX ADMIN — ENOXAPARIN SODIUM 40 MG: 40 INJECTION SUBCUTANEOUS at 08:12

## 2017-03-31 RX ADMIN — SODIUM CHLORIDE AND POTASSIUM CHLORIDE: 9; 1.49 INJECTION, SOLUTION INTRAVENOUS at 16:03

## 2017-03-31 RX ADMIN — TAMSULOSIN HYDROCHLORIDE 0.4 MG: 0.4 CAPSULE ORAL at 16:50

## 2017-03-31 RX ADMIN — Medication 10 ML: at 05:55

## 2017-03-31 RX ADMIN — SODIUM CHLORIDE AND POTASSIUM CHLORIDE: 9; 1.49 INJECTION, SOLUTION INTRAVENOUS at 05:55

## 2017-03-31 NOTE — PROGRESS NOTES
POD 1  C/o soreness. No nausea, vomiting  Surgery reviewed. Lungs clear  CV-reg  abd-incision dry now that DIANE holding charge. No evidence of skin ischemia. Stoma mildly edematous.   DIANE thin, blood-tinged    Plan-  Keep in bed through the weekend due to midline tension  Keep on clears for now- will advance when GI function definitely has returned so as to avoid distention

## 2017-03-31 NOTE — PROGRESS NOTES
Pt states he does not want to take any of the pain medication prescribed for him, because they are \"too strong\". Offered to call MD to request something less strong. Pt states he does not want to wake up the MD. Pt states his \"pain is fine\", and he is \"just sore\". Pt resting quietly in bed. No distress noted. Will continue to monitor.

## 2017-03-31 NOTE — PROGRESS NOTES
Admission assessment complete via doc flowsheet. Pt A&O x 4. HR regular, S1S2. Respirations even and unlabored on 3 L nasal cannula. Lung sounds CTA bilaterally. Abdomen soft and tender. Bowel sounds hypoactive in all quadrants. Abdominal dressing saturated with bright red blood. MD notified. Dressing changed per MD orders. Abdominal binder in place. Ostomy stoma pink. Ostomy draining minimal amount of bright red blood. MD notified. No orders received. DIANE draining sanguinous fluid but unable to stay charged. MD notified. Orders received to place Xeroform around insertion site of DIANE drain. Bishop catheter patent and draining yellow urine. Pt C/O pain 5/10 in abdomen but refuses medicinal interventions at this time. Pt instructed to call for pain interventions. Peripheral IV patent with IVF infusing. Pt instructed on use of Incentive Spirometer. Pt verbalizes understanding. Pt oriented to room. Pt resting in bed. Bed in low and locked position, side rails up x 3. Call light within reach. Pt instructed to call for assistance. Pt verbalizes understanding. No other needs expressed. No distress noted. Will continue to monitor.

## 2017-03-31 NOTE — WOUND CARE
Lengthy discussion of past ostomy needs, current supplies available and ways to troubleshoot, patient has had an ostomy for about a year, uses Lucia 2 piece cut to fit soft convexity with adapt rings at home. Supply available here is Lucia 2 piece flat, we do not have any adapt brand rings, Bridget brand rings are available from [facility], not available at this [place]. Patient was given 2 Brava rings from sample supply in ostomy office, this was the last 2 rings available in the samples. Patient also asked for adhesive remover and skin prep, given the only brand available here is [employer] adhesive remover and smith and nephew skin prep which was given to the nurse to give to the patient. Nurse today Shade Mancera LPN updated on all above. Will erna follow while in acute care should return to home supply and home cares at discharge, ok to have patient have family/ sig other bring home supply in to use while here.

## 2017-03-31 NOTE — PROGRESS NOTES
Pt arrived to floor. Abdominal dressing saturated with bright red blood. DIANE drain not holding the charge. DIANE re-inflates immediately. Ostomy draining bright red blood. MD notified. MD ordered to change outer dressing. No other orders received. Pt resting in bed. No distress noted. Will continue to monitor.

## 2017-04-01 LAB
ANION GAP BLD CALC-SCNC: 2 MMOL/L (ref 7–16)
BACTERIA SPEC CULT: ABNORMAL
BACTERIA SPEC CULT: ABNORMAL
BUN SERPL-MCNC: 5 MG/DL (ref 8–23)
CALCIUM SERPL-MCNC: 7.4 MG/DL (ref 8.3–10.4)
CHLORIDE SERPL-SCNC: 103 MMOL/L (ref 98–107)
CO2 SERPL-SCNC: 32 MMOL/L (ref 21–32)
CREAT SERPL-MCNC: 0.73 MG/DL (ref 0.8–1.5)
GLUCOSE SERPL-MCNC: 84 MG/DL (ref 65–100)
POTASSIUM SERPL-SCNC: 3.9 MMOL/L (ref 3.5–5.1)
SERVICE CMNT-IMP: ABNORMAL
SODIUM SERPL-SCNC: 137 MMOL/L (ref 136–145)

## 2017-04-01 PROCEDURE — 36415 COLL VENOUS BLD VENIPUNCTURE: CPT | Performed by: SURGERY

## 2017-04-01 PROCEDURE — 65270000029 HC RM PRIVATE

## 2017-04-01 PROCEDURE — 74011250637 HC RX REV CODE- 250/637: Performed by: SURGERY

## 2017-04-01 PROCEDURE — 80048 BASIC METABOLIC PNL TOTAL CA: CPT | Performed by: SURGERY

## 2017-04-01 PROCEDURE — 74011250636 HC RX REV CODE- 250/636: Performed by: SURGERY

## 2017-04-01 PROCEDURE — 87641 MR-STAPH DNA AMP PROBE: CPT | Performed by: SURGERY

## 2017-04-01 RX ORDER — MUPIROCIN 20 MG/G
OINTMENT TOPICAL 2 TIMES DAILY
Status: DISCONTINUED | OUTPATIENT
Start: 2017-04-01 | End: 2017-04-06 | Stop reason: HOSPADM

## 2017-04-01 RX ADMIN — Medication 5 ML: at 22:14

## 2017-04-01 RX ADMIN — LEVOTHYROXINE SODIUM 125 MCG: 75 TABLET ORAL at 08:31

## 2017-04-01 RX ADMIN — SODIUM CHLORIDE AND POTASSIUM CHLORIDE: 9; 1.49 INJECTION, SOLUTION INTRAVENOUS at 20:27

## 2017-04-01 RX ADMIN — SODIUM CHLORIDE AND POTASSIUM CHLORIDE: 9; 1.49 INJECTION, SOLUTION INTRAVENOUS at 02:01

## 2017-04-01 RX ADMIN — SODIUM CHLORIDE AND POTASSIUM CHLORIDE: 9; 1.49 INJECTION, SOLUTION INTRAVENOUS at 05:39

## 2017-04-01 RX ADMIN — AMITRIPTYLINE HYDROCHLORIDE 100 MG: 50 TABLET, FILM COATED ORAL at 22:14

## 2017-04-01 RX ADMIN — ACETAMINOPHEN 1000 MG: 500 TABLET, FILM COATED ORAL at 01:13

## 2017-04-01 RX ADMIN — CLONAZEPAM 4 MG: 1 TABLET ORAL at 22:13

## 2017-04-01 RX ADMIN — TAMSULOSIN HYDROCHLORIDE 0.4 MG: 0.4 CAPSULE ORAL at 16:57

## 2017-04-01 RX ADMIN — ENOXAPARIN SODIUM 40 MG: 40 INJECTION SUBCUTANEOUS at 08:31

## 2017-04-01 RX ADMIN — MUPIROCIN: 20 OINTMENT TOPICAL at 16:56

## 2017-04-01 RX ADMIN — ACETAMINOPHEN 1000 MG: 500 TABLET, FILM COATED ORAL at 18:50

## 2017-04-01 NOTE — PROGRESS NOTES
Pt C/O pain 5/10 in abdomen and requests Tylenol. Tylenol 500 mg PO administered, see MAR. Pt resting in bed. No distress noted. No other needs expressed. Will continue to monitor.

## 2017-04-01 NOTE — PROGRESS NOTES
Patient complains that his colostomy is leaking. Appliance changed and new dressing applied. Denies pain at present.

## 2017-04-01 NOTE — PROGRESS NOTES
General Surgery Progress Note    4/1/2017    Admit Date: 3/30/2017    Subjective:     Surgery (for Dr. Grace Angeles)  No problems overnight. Patient reports that he is to remain in bed this weekend so as not to disrupt his hernia repair. Ostomy has output. Objective:     Visit Vitals    /66 (BP 1 Location: Left arm, BP Patient Position: At rest;Supine)    Pulse 84    Temp 99.4 °F (37.4 °C)    Resp 17    Ht 5' 8\" (1.727 m)    Wt 141 lb 8 oz (64.2 kg)    SpO2 92%    BMI 21.52 kg/m2         Intake/Output Summary (Last 24 hours) at 04/01/17 0824  Last data filed at 04/01/17 0448   Gross per 24 hour   Intake             2316 ml   Output             3325 ml   Net            -1009 ml        EXAM:  ABD soft, not distended, active BS's. Wound dressing is clean. Ostomy viable with output. Data Review    Recent Results (from the past 24 hour(s))   METABOLIC PANEL, BASIC    Collection Time: 04/01/17  6:08 AM   Result Value Ref Range    Sodium 137 136 - 145 mmol/L    Potassium 3.9 3.5 - 5.1 mmol/L    Chloride 103 98 - 107 mmol/L    CO2 32 21 - 32 mmol/L    Anion gap 2 (L) 7 - 16 mmol/L    Glucose 84 65 - 100 mg/dL    BUN 5 (L) 8 - 23 MG/DL    Creatinine 0.73 (L) 0.8 - 1.5 MG/DL    GFR est AA >60 >60 ml/min/1.73m2    GFR est non-AA >60 >60 ml/min/1.73m2    Calcium 7.4 (L) 8.3 - 10.4 MG/DL        Hospital Problems  Date Reviewed: 3/30/2017          Codes Class Noted POA    Infection of implant [facility]) ICD-10-CM: W79.69IM  ICD-9-CM: 996.60  3/30/2017 Unknown          1 Bed rest this weekend  2. Leave Bishop in place. 3. Pain control  4.  Lovenox/IS  Laisha Agustin MD.

## 2017-04-01 NOTE — PROGRESS NOTES
Shift assessment complete via doc flowsheet. Pt A&O x 4. HR regular, S1S2. Respirations even and unlabored on room air. Lung sounds CTA bilaterally. Abdomen soft and tender. Bowel sounds active in all quadrants. Abdominal dressing C/D/I. Abdominal binder in place. DIANE patent, charged and draining serosanguinous fluid. Ostomy draining brown, loose stool. Stoma pink. Pt denies pain. Pt resting in bed. Bed in low and locked position, side rails up x 3. Call light within reach. Pt instructed to call for assistance. Pt verbalizes understanding. No other needs expressed. No distress noted. Will continue to monitor.

## 2017-04-02 PROCEDURE — 74011250637 HC RX REV CODE- 250/637: Performed by: SURGERY

## 2017-04-02 PROCEDURE — 74011250636 HC RX REV CODE- 250/636: Performed by: SURGERY

## 2017-04-02 PROCEDURE — 65270000029 HC RM PRIVATE

## 2017-04-02 RX ADMIN — SODIUM CHLORIDE AND POTASSIUM CHLORIDE: 9; 1.49 INJECTION, SOLUTION INTRAVENOUS at 06:00

## 2017-04-02 RX ADMIN — MUPIROCIN: 20 OINTMENT TOPICAL at 17:33

## 2017-04-02 RX ADMIN — ENOXAPARIN SODIUM 40 MG: 40 INJECTION SUBCUTANEOUS at 09:45

## 2017-04-02 RX ADMIN — MULTIPLE VITAMINS W/ MINERALS TAB 1 TABLET: TAB at 09:45

## 2017-04-02 RX ADMIN — AMITRIPTYLINE HYDROCHLORIDE 100 MG: 50 TABLET, FILM COATED ORAL at 22:11

## 2017-04-02 RX ADMIN — ACETAMINOPHEN 1000 MG: 500 TABLET, FILM COATED ORAL at 23:37

## 2017-04-02 RX ADMIN — SODIUM CHLORIDE AND POTASSIUM CHLORIDE: 9; 1.49 INJECTION, SOLUTION INTRAVENOUS at 15:33

## 2017-04-02 RX ADMIN — CLONAZEPAM 4 MG: 1 TABLET ORAL at 22:11

## 2017-04-02 RX ADMIN — LEVOTHYROXINE SODIUM 125 MCG: 75 TABLET ORAL at 09:45

## 2017-04-02 RX ADMIN — MUPIROCIN: 20 OINTMENT TOPICAL at 09:46

## 2017-04-02 RX ADMIN — TAMSULOSIN HYDROCHLORIDE 0.4 MG: 0.4 CAPSULE ORAL at 17:31

## 2017-04-02 NOTE — PROGRESS NOTES
PM assessment complete. Alert, oriented x 4. Respirations even and unlabored, no distress noted. Abdomen soft, bowel sounds active in all 4 quadrants. Abdominal dressing intact, abdominal binder in place. Ostomy stoma pink, brown output noted. DIANE drain patent and charged. Bishop patent and draining clear, yellow urine. Denies needs or pain. Aware to call should needs arise.

## 2017-04-02 NOTE — PROGRESS NOTES
dsg changed to abd. Midline abd incision intact with staples. xerofoam guaze placed on staple line. Covered by 4 by 4 guaze and abd pad. Secured with tape.

## 2017-04-02 NOTE — PROGRESS NOTES
Resting in bed. Reports mild soreness. Abd binder to abd. DIANE emptied of small amount and charged. No distress noted.

## 2017-04-02 NOTE — PROGRESS NOTES
Am assessment completed. A & O x 3, lungs clear breathing non-labored. p sites c/d/i, ostomy small amount of brown liq stool and rich drain with small amount of ss fluid. Verbalizes needs well. On bed rest. Denies pain. Continue to monitor.

## 2017-04-02 NOTE — PROGRESS NOTES
T99 VSS  Feels well. Incision fine. Change dressing. Ostomy functioning well. Tried to [employer] but he states Dr. Maggy Mesa will do that tomorrow. No changes.

## 2017-04-02 NOTE — PROGRESS NOTES
Assessment completed via doc flowsheet, pt alert and oriented, respirations present, even and unlabored, HOB elevated, pt denies any SOB at this time, pt encouraged to use IS while awake, pt verbalizes understanding, S1&S2 auscultated, HR regular, abd soft, tender, active BS in +4 quadrants, midline incision w/drsing, DIANE drain to R side of abd, patent and charged w/serosanginous drainage in bulb, colostomy on L side of abd, brown, liquid stool noted, abdominal binder in place, brito catheter in place, patent and draining, clear, yellow urine, IVF infusing without difficulty, pt denies any pain at this time, pt instructed to call for assistance, pt verbalizes understanding, bed low and locked, side rails x3, call light within reach.

## 2017-04-03 PROCEDURE — 74011250637 HC RX REV CODE- 250/637: Performed by: SURGERY

## 2017-04-03 PROCEDURE — 65270000029 HC RM PRIVATE

## 2017-04-03 PROCEDURE — 74011250636 HC RX REV CODE- 250/636: Performed by: SURGERY

## 2017-04-03 RX ADMIN — SODIUM CHLORIDE AND POTASSIUM CHLORIDE: 9; 1.49 INJECTION, SOLUTION INTRAVENOUS at 13:59

## 2017-04-03 RX ADMIN — TAMSULOSIN HYDROCHLORIDE 0.4 MG: 0.4 CAPSULE ORAL at 18:00

## 2017-04-03 RX ADMIN — ACETAMINOPHEN 1000 MG: 500 TABLET, FILM COATED ORAL at 18:25

## 2017-04-03 RX ADMIN — Medication 10 ML: at 22:21

## 2017-04-03 RX ADMIN — SODIUM CHLORIDE AND POTASSIUM CHLORIDE: 9; 1.49 INJECTION, SOLUTION INTRAVENOUS at 01:04

## 2017-04-03 RX ADMIN — MUPIROCIN: 20 OINTMENT TOPICAL at 17:15

## 2017-04-03 RX ADMIN — MULTIPLE VITAMINS W/ MINERALS TAB 1 TABLET: TAB at 07:57

## 2017-04-03 RX ADMIN — LEVOTHYROXINE SODIUM 125 MCG: 75 TABLET ORAL at 07:57

## 2017-04-03 RX ADMIN — SODIUM CHLORIDE AND POTASSIUM CHLORIDE: 9; 1.49 INJECTION, SOLUTION INTRAVENOUS at 22:21

## 2017-04-03 RX ADMIN — Medication 5 ML: at 14:00

## 2017-04-03 RX ADMIN — ENOXAPARIN SODIUM 40 MG: 40 INJECTION SUBCUTANEOUS at 07:56

## 2017-04-03 RX ADMIN — MUPIROCIN: 20 OINTMENT TOPICAL at 08:00

## 2017-04-03 RX ADMIN — CLONAZEPAM 4 MG: 1 TABLET ORAL at 22:20

## 2017-04-03 RX ADMIN — AMITRIPTYLINE HYDROCHLORIDE 100 MG: 50 TABLET, FILM COATED ORAL at 22:21

## 2017-04-03 NOTE — PROGRESS NOTES
Assessment done via doc flow sheet. Pt resting in bed, resp easy & regular, alert & oriented x3, denies pain. Abdomen flat, with midline incision covered with dressing dry & intact with abd binder in place, left colostomy patent, draining greenish output, DIANE to Right side of abd , patent, draining serosanguinous. Bishop draining clear, yellow urine. Bed low & locked, side rails x3 up with call light within reach, instructed pt to call for assistance as needed.

## 2017-04-03 NOTE — PROGRESS NOTES
Bedside report received from [address] [place] [address]. Pt in bed resting, no noted distress.

## 2017-04-03 NOTE — PROGRESS NOTES
Doing great. Wants to get up, as per our previous plan  Visit Vitals    /59    Pulse 66    Temp 97 °F (36.1 °C)    Resp 16    Ht 5' 8\" (1.727 m)    Wt 141 lb 8 oz (64.2 kg)    SpO2 97%    BMI 21.52 kg/m2      abd soft, scaphoid. Incision ok. (+) stool output. DIANE clear, blood-tinged    Plan-  Advance to full liquids  Mobilize today, brito out.

## 2017-04-03 NOTE — PROGRESS NOTES
Pt in bed this PM, assessment complete. DIANE drain to right side charged and patent, abdominal binder intact. Bishop draining clear, yellow urine. No noted distress. Pt denies needs. Bed L/L, call bell is within reach and side rails are up x 2.

## 2017-04-04 PROCEDURE — 74011250636 HC RX REV CODE- 250/636: Performed by: SURGERY

## 2017-04-04 PROCEDURE — 74011250637 HC RX REV CODE- 250/637: Performed by: SURGERY

## 2017-04-04 PROCEDURE — 65270000029 HC RM PRIVATE

## 2017-04-04 RX ORDER — SULFAMETHOXAZOLE AND TRIMETHOPRIM 800; 160 MG/1; MG/1
1 TABLET ORAL EVERY 12 HOURS
Status: DISCONTINUED | OUTPATIENT
Start: 2017-04-04 | End: 2017-04-06 | Stop reason: HOSPADM

## 2017-04-04 RX ADMIN — ENOXAPARIN SODIUM 40 MG: 40 INJECTION SUBCUTANEOUS at 08:00

## 2017-04-04 RX ADMIN — ACETAMINOPHEN 1000 MG: 500 TABLET, FILM COATED ORAL at 22:06

## 2017-04-04 RX ADMIN — TAMSULOSIN HYDROCHLORIDE 0.4 MG: 0.4 CAPSULE ORAL at 17:15

## 2017-04-04 RX ADMIN — LEVOTHYROXINE SODIUM 125 MCG: 75 TABLET ORAL at 07:49

## 2017-04-04 RX ADMIN — ACETAMINOPHEN 1000 MG: 500 TABLET, FILM COATED ORAL at 03:18

## 2017-04-04 RX ADMIN — Medication 10 ML: at 22:07

## 2017-04-04 RX ADMIN — CLONAZEPAM 4 MG: 1 TABLET ORAL at 22:05

## 2017-04-04 RX ADMIN — MUPIROCIN: 20 OINTMENT TOPICAL at 09:00

## 2017-04-04 RX ADMIN — AMITRIPTYLINE HYDROCHLORIDE 100 MG: 50 TABLET, FILM COATED ORAL at 22:06

## 2017-04-04 RX ADMIN — SULFAMETHOXAZOLE AND TRIMETHOPRIM 1 TABLET: 800; 160 TABLET ORAL at 22:06

## 2017-04-04 RX ADMIN — SODIUM CHLORIDE AND POTASSIUM CHLORIDE: 9; 1.49 INJECTION, SOLUTION INTRAVENOUS at 22:10

## 2017-04-04 RX ADMIN — MULTIPLE VITAMINS W/ MINERALS TAB 1 TABLET: TAB at 08:00

## 2017-04-04 RX ADMIN — MUPIROCIN: 20 OINTMENT TOPICAL at 18:00

## 2017-04-04 RX ADMIN — ACETAMINOPHEN 1000 MG: 500 TABLET, FILM COATED ORAL at 15:58

## 2017-04-04 NOTE — PROGRESS NOTES
Problem: Nutrition Deficit  Goal: *Optimize nutritional status  Nutrition  Reason for assessment: Length of stay day 5   Assessment:   Diet order(s): 4/04 Regular effective 1515; full liquid 4/03-4/04; clear liquids 3/31-4/03; NPO 3/30  Food/Nutrition History:  Pt presented r/t infection of implant. Pt s/p day 5 surgery-infected mesh /incisional hernia repair with mesh. Past medical hstory notable for rectal cancer-has colostomy. No nutrition risk factors identified on nursing admission malnutrition screening tool. Pt has been eating a high protein diet at home with ~ 125 gm/pro/day; endorses intentional weight gain of  ~ 23# past year (weight of 118# in May 2016). Pt has tolerated clears and full liquids since admission; no n/v; c/o abdominal soreness. Skin Status:  Midline incision with staples  Anthropometrics:Height: 5' 8\" (172.7 cm), Weight Source: Standing scale (comment), Weight: 64.2 kg (141 lb 8 oz), Body mass index is 21.52 kg/(m^2). BMI class of low normal range for Older American Male. BMI < 23 c/w sign of nutrition risk. .  Macronutrient needs:  EER:  2259-3860 kcal /day (30-35 kcal/kg BW)  EPR:  77-96 grams protein/day (1.2-1.5 grams/kg BW)  Intake/Comparative Standards: One recorded intake of 100% while on clear liquids. Clear liquids does not meet estimated kcal or protein needs. 1 day on full liquids and started on solids tonight; has not yet received supper tray. Insufficient data to determine % kcal and protein needs met at this time. Nutrition Diagnosis: Increased kcal and protein needs r/t wound healing as evidenced by pt s/p day 5 surgery. Intervention:  1. Meals and snacks: Continue current diet. Assisted patient with supper menu selection. 2. Nutritional supplement therapy: Pt declines at this time.      Geovanna Elizabeth, [address] [place] [address], [facility] [place] [facility], MPH  [phone]

## 2017-04-04 NOTE — PROGRESS NOTES
Looks great. Has been in halls twice- sore. (+)ostomy function  Incision noncelulitic. DIANE slightly cloudy    Plan-  Advance diet  Add bactrim prophylactically- wound with high bacterial burden preoperatively.

## 2017-04-04 NOTE — PROGRESS NOTES
Pt assessment completed. Alert and oriented. Lungs CTA even and unlabored. Heart sounds regular. Abdomen soft and non tender with active bowel sounds. IV present and infusing without difficulty. Pt denies pain needs at this time. DIANE present to right abdomen charged and draining serosanguinous drainage. Midline incision noted to abdomen with 31 staples open to are, clean dry and intact. Left ostomy present, stoma is pink and moist draining brown liquid drainage. All needs met at this time. Will continue to monitor.

## 2017-04-05 PROCEDURE — 74011250636 HC RX REV CODE- 250/636: Performed by: SURGERY

## 2017-04-05 PROCEDURE — 74011250637 HC RX REV CODE- 250/637: Performed by: SURGERY

## 2017-04-05 PROCEDURE — 65270000029 HC RM PRIVATE

## 2017-04-05 RX ORDER — SULFAMETHOXAZOLE AND TRIMETHOPRIM 800; 160 MG/1; MG/1
1 TABLET ORAL EVERY 12 HOURS
Qty: 28 TAB | Refills: 0 | Status: SHIPPED | OUTPATIENT
Start: 2017-04-05 | End: 2017-04-19

## 2017-04-05 RX ORDER — HYDROCODONE BITARTRATE AND ACETAMINOPHEN 5; 325 MG/1; MG/1
TABLET ORAL
Qty: 20 TAB | Refills: 0 | Status: SHIPPED | OUTPATIENT
Start: 2017-04-05 | End: 2017-04-18

## 2017-04-05 RX ADMIN — SULFAMETHOXAZOLE AND TRIMETHOPRIM 1 TABLET: 800; 160 TABLET ORAL at 08:01

## 2017-04-05 RX ADMIN — MULTIPLE VITAMINS W/ MINERALS TAB 1 TABLET: TAB at 08:01

## 2017-04-05 RX ADMIN — ACETAMINOPHEN 1000 MG: 500 TABLET, FILM COATED ORAL at 17:11

## 2017-04-05 RX ADMIN — MUPIROCIN: 20 OINTMENT TOPICAL at 08:01

## 2017-04-05 RX ADMIN — SODIUM CHLORIDE AND POTASSIUM CHLORIDE: 9; 1.49 INJECTION, SOLUTION INTRAVENOUS at 21:42

## 2017-04-05 RX ADMIN — SULFAMETHOXAZOLE AND TRIMETHOPRIM 1 TABLET: 800; 160 TABLET ORAL at 21:37

## 2017-04-05 RX ADMIN — LEVOTHYROXINE SODIUM 125 MCG: 75 TABLET ORAL at 08:01

## 2017-04-05 RX ADMIN — CLONAZEPAM 4 MG: 1 TABLET ORAL at 21:37

## 2017-04-05 RX ADMIN — Medication 10 ML: at 21:42

## 2017-04-05 RX ADMIN — AMITRIPTYLINE HYDROCHLORIDE 100 MG: 50 TABLET, FILM COATED ORAL at 21:37

## 2017-04-05 RX ADMIN — TAMSULOSIN HYDROCHLORIDE 0.4 MG: 0.4 CAPSULE ORAL at 17:11

## 2017-04-05 RX ADMIN — ACETAMINOPHEN 1000 MG: 500 TABLET, FILM COATED ORAL at 04:11

## 2017-04-05 RX ADMIN — ACETAMINOPHEN 1000 MG: 500 TABLET, FILM COATED ORAL at 10:58

## 2017-04-05 RX ADMIN — Medication 5 ML: at 06:11

## 2017-04-05 RX ADMIN — MUPIROCIN: 20 OINTMENT TOPICAL at 18:00

## 2017-04-05 RX ADMIN — ENOXAPARIN SODIUM 40 MG: 40 INJECTION SUBCUTANEOUS at 08:01
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
## 2017-04-05 NOTE — PROGRESS NOTES
Pt assessment completed. Alert and oriented. Lungs CTA even and unlabored. Heart sounds regular. Abdomen soft and non tender with active bowel sounds. IV present and infusing without difficulty. Colostomy present on left abdomen draining brown liquid drainage. DIANE drain present to right side of abdomen patent and charged draining serosanguinous drainage. Midline incision noted to abdomen with staples, open to air, clean dry and intact. PT denies any pain needs at this time. All needs met will continue to monitor.

## 2017-04-05 NOTE — PROGRESS NOTES
Assessment completed via flowsheet. Patient alert and oriented x4. Heart and lung sounds clear, regular, and unlabored. Abd soft, tender with active bowel sounds x4 quadrants. Colostomy to left abd, patient empties bag himself. DIANE drain to right abd, patent, charged, draining serosanguinous drainage. Midline incision to abd with staples, poen to air, c/d/i. IVF infusing without difficulty, site c/d/i. Patient denies pain at this time. Instructed to call with needs. Bed low and locked, call light within reach.

## 2017-04-05 NOTE — PROGRESS NOTES
Patient received 1000 mg Tylenol PO as ordered for abd pain rated 4/10.  See [facility]

## 2017-04-05 NOTE — DISCHARGE SUMMARY
Physician Discharge Summary     Patient ID:  Kev Figueredo  [id number]  66 y.o.  1945    Admit Date: 3/30/2017    Discharge Date: 4/5/2017    * Admission Diagnoses: Infection of implant, subsequent encounter [T85.79XD]    * Discharge Diagnoses:    Hospital Problems as of 4/5/2017  Date Reviewed: 3/30/2017          Codes Class Noted - Resolved POA    Infection of implant [facility]) ICD-10-CM: T85.79XA  ICD-9-CM: 996.60  3/30/2017 - Present Unknown               Admission Condition: Fair    * Discharge Condition: good    * Procedures: Procedure(s):  REMOVAL OF INFECTED MESH/ INCISIONAL HERNIA REPAIR WITH MESH ( ALLODERM 18X20) /DEBRIDEMENT OF SKIN AND SUBCUTANEOUS TISSUE      * Hospital Course:   Normal hospital course for this procedure. 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 Qty: 28 Tab, Refills: 0    Associated Diagnoses: Infection of implant, sequela      HYDROcodone-acetaminophen (NORCO) 5-325 mg per tablet Take 1-2 tabs po Q4-6hrs prn pain  Qty: 20 Tab, Refills: 0         CONTINUE these medications which have NOT CHANGED    Details   CENTRATEX 106 mg iron- 1 mg cap TAKE ONE CAPSULE BY MOUTH ONE TIME DAILY  Refills: 11    Associated Diagnoses: Iron deficiency      levothyroxine (SYNTHROID) 125 mcg tablet Take 1 Tab by mouth Daily (before breakfast). Indications: hypothyroidism  Qty: 30 Tab, Refills: 11    Associated Diagnoses: Acquired hypothyroidism      mupirocin (BACTROBAN) 2 % ointment Apply  to affected area daily.   Qty: 22 g, Refills: 11    Associated Diagnoses: Nonhealing surgical wound, subsequent encounter      clonazePAM (KLONOPIN) 2 mg tablet Take 2 Tabs by mouth nightly. Max Daily Amount: 4 mg. Indications: takes for sleep  Qty: 30 Tab, Refills: 0      amitriptyline (ELAVIL) 100 mg tablet Take 100 mg by mouth nightly. Dr Adelita Pablo psych  Indications: takes for sleep      tamsulosin (FLOMAX) 0.4 mg capsule Take 0.4 mg by mouth every evening. Indications: pt reports he takes med after surgery per urologist recommendation             * Follow-up Care/Patient Instructions:   Activity: No heavy lifting for several weeks  Diet: Regular Diet  Wound Care: None needed and monitor character of drain output (volume out not important)- call if becomes cloudy    Follow-up Information     Follow up With Details Comments Contact Info    Yary Rodarte, DO   [address] [facility]  [facility] [address] [phone]          Follow-up tests/labs -none    Signed:  Severa Emms, MD  4/5/2017  5:02 PM

## 2017-04-05 NOTE — PROGRESS NOTES
1000 mg Tylenol given PO as ordered for abd pain. See MAR. Patient stated that he felt sweaty, checked temp. 97.2 orally.

## 2017-04-06 VITALS
HEIGHT: 68 IN | BODY MASS INDEX: 21.44 KG/M2 | RESPIRATION RATE: 16 BRPM | DIASTOLIC BLOOD PRESSURE: 76 MMHG | HEART RATE: 80 BPM | SYSTOLIC BLOOD PRESSURE: 137 MMHG | WEIGHT: 141.5 LBS | OXYGEN SATURATION: 100 % | TEMPERATURE: 97.5 F

## 2017-04-06 PROCEDURE — 74011250637 HC RX REV CODE- 250/637: Performed by: SURGERY

## 2017-04-06 PROCEDURE — 74011250636 HC RX REV CODE- 250/636: Performed by: SURGERY

## 2017-04-06 RX ADMIN — ACETAMINOPHEN 1000 MG: 500 TABLET, FILM COATED ORAL at 00:54

## 2017-04-06 RX ADMIN — LEVOTHYROXINE SODIUM 125 MCG: 75 TABLET ORAL at 08:10

## 2017-04-06 RX ADMIN — ENOXAPARIN SODIUM 40 MG: 40 INJECTION SUBCUTANEOUS at 08:10

## 2017-04-06 RX ADMIN — SULFAMETHOXAZOLE AND TRIMETHOPRIM 1 TABLET: 800; 160 TABLET ORAL at 08:10

## 2017-04-06 RX ADMIN — MULTIPLE VITAMINS W/ MINERALS TAB 1 TABLET: TAB at 08:10

## 2017-04-06 RX ADMIN — Medication 10 ML: at 06:25

## 2017-04-06 RX ADMIN — ACETAMINOPHEN 1000 MG: 500 TABLET, FILM COATED ORAL at 08:12

## 2017-04-06 NOTE — PROGRESS NOTES
Assessment completed via flowsheet. Patient alert and oriented x4. Heart and lung sounds clear, regular, and unlabored. Abd soft, tender with active bowel sounds x4 quadrants. Midline with staples, open to air, c/d/i. Colostomy to left abd, brown loose output, patient empties ostomy. DIANE to right abd, charged, patent, draining scant amount of serosanguinous drainage. Patient using urinal at bedside, ambulating to restroom for toileting needs. IVF infusing without difficulty, site c/d/i. Patient denies pain at this time. Instructed to call with needs. Bed low and locked, call light within reach.

## 2017-04-06 NOTE — PROGRESS NOTES
Am assessment completed. Pt is alert and oriented x 3, lungs clear breathing non-labored. Bowel sounds + q 4 patient has colostomy that is properly functioning. Pt verbalizes needs well. Takes meds whole with thin liquids. DIANE drain patent and charged. Denies pain at this time. Safety measures in place. Continue to monitor.

## 2017-04-06 NOTE — DISCHARGE INSTRUCTIONS
* Follow-up Care/Patient Instructions: Activity: No heavy lifting for several weeks  Diet: Regular Diet  Wound Care: None needed and monitor character of drain output (volume out not important)- call if becomes cloudy      DISCHARGE SUMMARY from Nurse    The following personal items are in your possession at time of discharge:    Dental Appliances: None  Visual Aid: None                            PATIENT INSTRUCTIONS:    After general anesthesia or intravenous sedation, for 24 hours or while taking prescription Narcotics:  · Limit your activities  · Do not drive and operate hazardous machinery  · Do not make important personal or business decisions  · Do  not drink alcoholic beverages  · If you have not urinated within 8 hours after discharge, please contact your surgeon on call. Report the following to your surgeon:  · Excessive pain, swelling, redness or odor of or around the surgical area  · Temperature over 100.5  · Nausea and vomiting lasting longer than 4 hours or if unable to take medications  · Any signs of decreased circulation or nerve impairment to extremity: change in color, persistent  numbness, tingling, coldness or increase pain  · Any questions        What to do at Home:  Recommended activity: Activity as tolerated, per MD    If you experience any of the following symptoms fever>101, pain unrelieved with medication, nausea/vomiting, shortness of breath, dizziness/fainting, chest pain. , please follow up with your doctor. *  Please give a list of your current medications to your Primary Care Provider. *  Please update this list whenever your medications are discontinued, doses are      changed, or new medications (including over-the-counter products) are added. *  Please carry medication information at all times in case of emergency situations.           These are general instructions for a healthy lifestyle:    No smoking/ No tobacco products/ Avoid exposure to second hand smoke    Surgeon General's Warning:  Quitting smoking now greatly reduces serious risk to your health. Obesity, smoking, and sedentary lifestyle greatly increases your risk for illness    A healthy diet, regular physical exercise & weight monitoring are important for maintaining a healthy lifestyle    You may be retaining fluid if you have a history of heart failure or if you experience any of the following symptoms:  Weight gain of 3 pounds or more overnight or 5 pounds in a week, increased swelling in our hands or feet or shortness of breath while lying flat in bed. Please call your doctor as soon as you notice any of these symptoms; do not wait until your next office visit. Recognize signs and symptoms of STROKE:    F-face looks uneven    A-arms unable to move or move unevenly    S-speech slurred or non-existent    T-time-call 911 as soon as signs and symptoms begin-DO NOT go       Back to bed or wait to see if you get better-TIME IS BRAIN. Warning Signs of HEART ATTACK     Call 911 if you have these symptoms:   Chest discomfort. Most heart attacks involve discomfort in the center of the chest that lasts more than a few minutes, or that goes away and comes back. It can feel like uncomfortable pressure, squeezing, fullness, or pain.  Discomfort in other areas of the upper body. Symptoms can include pain or discomfort in one or both arms, the back, neck, jaw, or stomach.  Shortness of breath with or without chest discomfort.  Other signs may include breaking out in a cold sweat, nausea, or lightheadedness. Don't wait more than five minutes to call 911 - MINUTES MATTER! Fast action can save your life. Calling 911 is almost always the fastest way to get lifesaving treatment. Emergency Medical Services staff can begin treatment when they arrive -- up to an hour sooner than if someone gets to the hospital by car. The discharge information has been reviewed with the patient.   The patient verbalized understanding. Discharge medications reviewed with the patient and appropriate educational materials and side effects teaching were provided. Surgical Drain Care: Care Instructions  What is a surgical drain? After a surgery, fluid may collect inside your body in the surgical area. This makes an infection or other problems more likely. A surgical drain allows the fluid to flow out. The doctor will put a thin rubber tube into the area of your body where the fluid is likely to collect. The rubber tube will carry the fluid outside your body. The most common type of surgical drain carries the fluid into a collection bulb that you empty. This is called a Xavier-Schaefer drain. The drain uses suction created by the bulb to pull the fluid from your body into the bulb. The rubber tube will probably be held in place by one or two stitches in your skin. Most people attach the bulb with a safety pin to clothing or near the bandage so that it doesn't flip around or pull on the stitches. When you first get the drain, the fluid will be bloody. It will change color from red to pink to a light yellow or clear as the wound heals and the fluid starts to go away. Your doctor may give you specific information on when you no longer need the drain and when it will be removed. In general, you will need the drain until you are collecting less than about 2 tablespoons of fluid in 24 hours. Follow-up care is a key part of your treatment and safety. Be sure to make and go to all appointments, and call your doctor if you are having problems. It's also a good idea to know your test results and keep a list of the medicines you take. How can you care for yourself at home? Fluid collection  Follow any instructions your doctor gives you. How often you empty the bulb depends on how much fluid is draining. Empty the bulb when it is half full. To empty the bulb:  · Wash your hands with soap and water.   · Take the plug out of the bulb. · Empty the bulb. If your doctor asks you to measure the fluid, empty the fluid into a measuring cup, and write down how much you collected. · Clean the plug with alcohol. · Squeeze the bulb until it is flat. This removes all the air from the bulb. You may need to put the bulb on a table or a counter to flatten it. · Keep the bulb flat and put the plug in. · The bulb should stay flat after you put the plug back in. This creates the suction that pulls the fluid into the bulb. · Empty the fluid into the toilet. · Wash your hands. Bandage care  You may have a bandage. Your doctor will tell you how often to change it. · Wash your hands with soap and water. · Take off the bandage from around the drain. · Clean the drain site and the skin around it with soap and water. Use gauze or a cotton swab. · When the site is dry, put on a new bandage. Drain care  Squeezing or \"milking\" the tube can help prevent clogs so that it drains correctly. Your doctor will tell you when you need to do this. In general, you do this when:  · You see a clot in the tube that is preventing fluid from draining. The clot may look like a dark, stringy lining. · You see fluid leaking around the tube where it goes into the skin. · You think there is no suction in the drain. To milk the tube:  · Use one hand to hold and pinch the tube where it leaves the skin. · With the other hand, pinch the tube with your thumb and first finger just below where you're holding it. · Slowly and firmly push your thumb and first finger down the tubing toward the bulb. · Do this as many times as you need to. The clot should move down the tube and into the bulb. When should you call for help? Call your doctor now or seek immediate medical care if:  · You have signs of infection, such as:  ¨ Increased pain, swelling, warmth, or redness around the area. ¨ Red streaks leading from the area. ¨ Pus draining from the area.   ¨ A fever.  · You see a sudden change in the color or smell of the drainage. · The tube is coming loose where it leaves your skin. Watch closely for changes in your health, and be sure to contact your doctor if:  · You see a lot of fluid around the drain. · You cannot remove a clot from the tube by milking the tube. Where can you learn more? Go to http://randall-cee.info/. Enter K117 in the search box to learn more about \"Surgical Drain Care: Care Instructions. \"  Current as of: May 27, 2016  Content Version: 11.2  © 4710-5436 [employer]. Care instructions adapted under license by [employer] (which disclaims liability or warranty for this information). If you have questions about a medical condition or this instruction, always ask your healthcare professional. [address] any warranty or liability for your use of this information.

## 2017-04-06 NOTE — PROGRESS NOTES
Discharge instructions and prescriptions provided and explained to the pt. Med side effect sheet reviewed. Opportunity for questions provided. Pt is ready to leave, called for wheelchair.

## 2017-04-07 ENCOUNTER — PATIENT OUTREACH (OUTPATIENT)
Dept: CASE MANAGEMENT | Age: 72
End: 2017-04-07

## 2017-04-07 NOTE — PROGRESS NOTES
Transition of Care Discharge Follow-Up Questionnaire       Date/Time of Call:   April 7, 2017 3:48PM   What was the patient hospitalized for? Patient hospitalized for Infection of Implant           Does the patient understand his/her diagnosis and/or treatment and what happened during the hospitalization? Patient states understanding of diagnosis and treatment during hospitalization. Did the patient receive discharge instructions? Patient states discharge instructions explained and received before discharge to home. Review any discharge instructions (see notes in [facility]). Ask patient if they understand these. Do they have any questions? Patient states no questions at this time. Were home services ordered (nursing, PT, OT, ST, etc.)? Patient states no home health services ordered. If so, has the first visit occurred? If not, why? (Assist with coordination of services if necessary. ) NA         Was any DME ordered? Patient states no durable medical equipment. If so, has it been received? If not, why?  (Assist with coordination of arranging DME orders if necessary. ) NA         Complete a review of all medications (new, continued and discontinued meds per the D/C instructions and medication tab in [facility]). Care Coordinator reviewed all medications with patient per [facility], two new medications prescribed bactrim DS SEPTRA -800 mg tabs every 12 hours for 14 days, Norco 5-325 mg tabs po 1-2 tabs every 4- 6 hours PRN           Were all new prescriptions filled? If not, why?  (Assist with obtainment of medications if necessary.) Patient states new prescriptions filled and currently being taken per doctors order. Does the patient understand the purpose and dosing instructions for all medications? (If patient has questions, provide explanation and education.) Patient states understanding of medication purposes and dosing instructions.    Does the patient have any problems in performing ADLs? (If patient is unable to perform ADLs  what is the limiting factor(s)? Do they have a support system that can assist? If no support system is present, discuss possible assistance that they may be able to obtain.) Patient states he is independent with ADL's and ambulation. Patient states that he feels sore but is doing well, taking it easy and moving slow when doing ADL's and ambulating. Patient states that he no additional needs at this time. Does the patient have all follow-up appointments scheduled? Has transportation been arranged? [facility] follow-up should be within 7 days of discharge; all others should have PCP follow-up within 7 days of discharge; follow-ups with other specialists as appropriate or ordered.) Patient states follow up appointment scheduled with [facility], April 18, 2017 @ 9:45AM with Dr. Erin Díaz. Care Coordinator advised patient to schedule follow up via three way calling. Patient states that he will call and schedule follow up soon. Care Coordinator informed patient that follow up call in 2 business days to confirm appointment date/time. Patient states no transportation needs     Any other questions or concerns expressed by the patient? Patient states no questions or concerns. Schedule next appointment with ASHLYN VÁZQUEZ Coordinator or refer to RN Case Manager/  as appropriate. No further needs identified, patient instructed to call Care Coordinator if further questions or concerns arise.    MIKE Call Completed By: Cristhian Beckman LPN  Care Coordinator   [facility]

## 2017-04-10 ENCOUNTER — PATIENT OUTREACH (OUTPATIENT)
Dept: CASE MANAGEMENT | Age: 72
End: 2017-04-10

## 2017-04-11 NOTE — OP NOTES
[facility] 63   OPERATIVE REPORT       Name:  Ray Chow   MR#:  [id number]   :  1945   Account #:  [de-identified]   Date of Adm:  2017       PREOPERATIVE DIAGNOSIS: Infected mesh implant with incisional   hernia. POSTOPERATIVE DIAGNOSIS: Infected mesh implant with incisional   hernia. NAME OF PROCEDURE   1. Removal of infected mesh. 2. Incisional hernia repair with mesh (AlloDerm). 3. Debridement of skin and subcutaneous tissue. SURGEON: Cathy Dutta MD    ANESTHESIA: General.    COMPLICATIONS: None. INDICATIONS: As outlined in the H and P. PROCEDURE: Informed consent was obtained. The patient was   brought to the operating room, placed on the table in supine   position with adequate pressure points and compression devices   on both lower extremities. After successful induction of   anesthesia, the abdomen was prepped and draped sterilely after   the colostomy was sewn closed with a silk suture. Beginning at the apex of the midline wound, cautery was used to   create a plane under the skin and subcutaneous tissue, and   granulation tissue where it existed, to expose the mesh. The   skin and subcutaneous flaps were lifted all the way lateral to   the periphery of the mesh and the mesh was freed from the   underlying fascia also with cautery. Numerous Prolene sutures   were encountered and these were removed as well. Fortunately,   there were minimal adhesions of the mesh to the underlying   viscera. There was excellent incorporation of the mesh, where it   was actually covered by tissue, making its excision somewhat   difficult. This incorporation lead to mild to moderate oozing,   which was controlled as it was encountered, but there was no   vigorous bleeding from any sites. This was done   circumferentially to completely free the mesh and it was   removed.      The undersurface of the flaps was inspected and pockets of   granulation tissue were excised sharply given the presumed   colonization of this tissue. This left a circumferential border   of fresh and viable tissue. Primary closure was evaluated and the fascial margins were   fairly wide apart. He had previously undergone an incisional   hernia repair by component separation technique, thus any   ability to medialize the rectus muscles was uncertain and he now   has the left lower quadrant stoma. Regardless, attempt was made   to mobilize the fascia by repeating the component separation   technique. This was done by identifying the peritoneum and   posterior fascial layer in the periumbilical region, first   on the right side. A good tissue plane was encountered within a   centimeter or so of the edge of the wound, allowing development   of the posterior layer without significant difficulty, this   being done using cautery. The dissection continued both   superiorly and inferiorly along the length of the wound and was   carried all the way to the lateral edge of the rectus sheath. Next, the skin and very scant subcutaneous tissue was elevated   off the anterior fascia, which was intact, although of somewhat   thin quality. This was also done with cautery and was continued   also laterally, along the entire axial length of the wound, all   the way over to the edge of the fascia where the rectus muscle   was encountered consistent with his prior myocutaneous flap   creation. This was then repeated on the left side, with the only   difference being avoiding the colostomy and when this was   completed, the fascia was able to be approximated in the midline   without undue tension. Accordingly, the posterior layer was   closed with a running Vicryl suture. The anterior layer was   closed with a series of interrupted #1 Prolene sutures in a   figure-of-eight fashion.  Primary closure was chosen as it was   not felt appropriate to the implant another synthetic mesh in   the face of his prior colonized, if not grossly infected,   abdominal wall given the prolonged period of mesh exposure   prompting this procedure. After the fascia was closed, however,   a reinforcement onlay patch of AlloDerm was placed in hopes of   improving the strength of the midline closure long-term. An 18 x   20 sheet of AlloDerm was used and placed along the length of the   wound. It was turned on its diagonal to get overlap at the   apices. Redundant graft laterally was trimmed. It was then   sutured to the anterior fascia with running 2-0 Prolene. Romina Durie drain was placed in the subcutaneous space and was brought   out through a separate stab incision and sutured in place with   3-0 nylon. As a result of the prior skin and subcutaneous   mobilization, there was actually redundant skin available for   primary skin closure and thus some of the skin along the edges   of the wound, where there had been chronic granulation tissue,   was debrided. The scant subcutaneous tissue was closed with   Vicryl suture and the skin was closed with staples. Sterile   dressing was applied. It should be noted that while there were not diffuse adhesions   to the mesh, as noted above, there were several foci of dense   adhesions. In the left lower quadrant, during initial mesh   mobilization, an incidental longitudinal enterotomy was created as is not   uncommon in these types of procedures. There was no spillage   from this small bowel defect. After the mesh was removed from   the area, the enterotomy was closed in a transverse fashion with   a single firing of a linear stapler with good patency of the   lumen confirmed by digital examination. Prior to closing the   posterior fascial sheath, this enterotomy repair was again   inspected and the dissected surfaces of the small bowel were all   inspected with no evidence of any other enterotomies. The patient tolerated the above procedure well with no immediate   apparent complications.  He was awakened from anesthesia and   extubated in the operating room and taken to the recovery room   in satisfactory condition.         MD Jignesh Yepez / Dennis[facility]   D:  04/10/2017   23:57   T:  04/11/2017   09:16   Job #:  [id number]

## 2017-04-19 ENCOUNTER — PATIENT OUTREACH (OUTPATIENT)
Dept: CASE MANAGEMENT | Age: 72
End: 2017-04-19

## 2017-05-22 ENCOUNTER — PATIENT OUTREACH (OUTPATIENT)
Dept: CASE MANAGEMENT | Age: 72
End: 2017-05-22

## 2017-09-28 ENCOUNTER — HOSPITAL ENCOUNTER (OUTPATIENT)
Dept: LAB | Age: 72
Discharge: HOME | End: 2017-09-28
Payer: MEDICARE

## 2017-09-28 DIAGNOSIS — C20 RECTAL CANCER (HCC): ICD-10-CM

## 2017-09-28 LAB
ALBUMIN SERPL-MCNC: 3.5 G/DL (ref 3.2–4.6)
ALBUMIN/GLOB SERPL: 0.9 {RATIO}
ALP SERPL-CCNC: 103 U/L (ref 50–136)
ALT SERPL-CCNC: 28 U/L (ref 12–65)
ANION GAP SERPL CALC-SCNC: 6 MMOL/L
AST SERPL-CCNC: 26 U/L (ref 15–37)
BASOPHILS # BLD: 0 K/UL (ref 0–0.2)
BASOPHILS NFR BLD: 0 % (ref 0–2)
BILIRUB SERPL-MCNC: 0.3 MG/DL (ref 0.2–1.1)
BUN SERPL-MCNC: 22 MG/DL (ref 8–23)
CALCIUM SERPL-MCNC: 8.6 MG/DL (ref 8.3–10.4)
CEA SERPL-MCNC: 1.2 NG/ML (ref 0–3)
CHLORIDE SERPL-SCNC: 106 MMOL/L (ref 98–107)
CO2 SERPL-SCNC: 27 MMOL/L (ref 21–32)
CREAT SERPL-MCNC: 0.9 MG/DL (ref 0.8–1.5)
DIFFERENTIAL METHOD BLD: ABNORMAL
EOSINOPHIL # BLD: 0 K/UL (ref 0–0.8)
EOSINOPHIL NFR BLD: 0 % (ref 0.5–7.8)
ERYTHROCYTE [DISTWIDTH] IN BLOOD BY AUTOMATED COUNT: 15.8 % (ref 11.9–14.6)
GLOBULIN SER CALC-MCNC: 3.9 G/DL
GLUCOSE SERPL-MCNC: 96 MG/DL (ref 65–100)
HCT VFR BLD AUTO: 39.1 % (ref 41.1–50.3)
HGB BLD-MCNC: 12.7 G/DL (ref 13.6–17.2)
LYMPHOCYTES # BLD: 0.4 K/UL (ref 0.5–4.6)
LYMPHOCYTES NFR BLD: 6 % (ref 13–44)
MAGNESIUM SERPL-MCNC: 2.4 MG/DL (ref 1.8–2.4)
MCH RBC QN AUTO: 29 PG (ref 26.1–32.9)
MCHC RBC AUTO-ENTMCNC: 32.5 G/DL (ref 31.4–35)
MCV RBC AUTO: 89.3 FL (ref 79.6–97.8)
MONOCYTES # BLD: 0.5 K/UL (ref 0.1–1.3)
MONOCYTES NFR BLD: 9 % (ref 4–12)
NEUTS SEG # BLD: 4.6 K/UL (ref 1.7–8.2)
NEUTS SEG NFR BLD: 85 % (ref 43–78)
NRBC # BLD: 0 K/UL (ref 0–0.2)
PLATELET # BLD AUTO: 214 K/UL (ref 150–450)
PMV BLD AUTO: 9.1 FL (ref 10.8–14.1)
POTASSIUM SERPL-SCNC: 4.7 MMOL/L (ref 3.5–5.1)
PROT SERPL-MCNC: 7.4 G/DL (ref 6.3–8.2)
RBC # BLD AUTO: 4.38 M/UL (ref 4.23–5.67)
SODIUM SERPL-SCNC: 139 MMOL/L (ref 136–145)
WBC # BLD AUTO: 5.5 K/UL (ref 4.3–11.1)

## 2017-09-28 PROCEDURE — 83735 ASSAY OF MAGNESIUM: CPT | Performed by: INTERNAL MEDICINE

## 2017-09-28 PROCEDURE — 80053 COMPREHEN METABOLIC PANEL: CPT | Performed by: INTERNAL MEDICINE

## 2017-09-28 PROCEDURE — 85025 COMPLETE CBC W/AUTO DIFF WBC: CPT | Performed by: INTERNAL MEDICINE

## 2017-09-28 PROCEDURE — 82378 CARCINOEMBRYONIC ANTIGEN: CPT | Performed by: INTERNAL MEDICINE

## 2017-09-28 PROCEDURE — 36415 COLL VENOUS BLD VENIPUNCTURE: CPT | Performed by: INTERNAL MEDICINE

## 2018-02-11 ENCOUNTER — ANESTHESIA EVENT (OUTPATIENT)
Dept: ENDOSCOPY | Age: 73
End: 2018-02-11
Payer: MEDICARE

## 2018-02-11 RX ORDER — SODIUM CHLORIDE 0.9 % (FLUSH) 0.9 %
5-10 SYRINGE (ML) INJECTION AS NEEDED
Status: CANCELLED | OUTPATIENT
Start: 2018-02-11

## 2018-02-11 RX ORDER — SODIUM CHLORIDE, SODIUM LACTATE, POTASSIUM CHLORIDE, CALCIUM CHLORIDE 600; 310; 30; 20 MG/100ML; MG/100ML; MG/100ML; MG/100ML
100 INJECTION, SOLUTION INTRAVENOUS CONTINUOUS
Status: CANCELLED | OUTPATIENT
Start: 2018-02-11

## 2018-02-12 ENCOUNTER — HOSPITAL ENCOUNTER (OUTPATIENT)
Age: 73
Setting detail: OUTPATIENT SURGERY
Discharge: HOME | End: 2018-02-12
Attending: SURGERY | Admitting: SURGERY
Payer: MEDICARE

## 2018-02-12 ENCOUNTER — ANESTHESIA (OUTPATIENT)
Dept: ENDOSCOPY | Age: 73
End: 2018-02-12
Payer: MEDICARE

## 2018-02-12 VITALS
SYSTOLIC BLOOD PRESSURE: 138 MMHG | HEIGHT: 70 IN | TEMPERATURE: 98.6 F | HEART RATE: 62 BPM | RESPIRATION RATE: 20 BRPM | DIASTOLIC BLOOD PRESSURE: 76 MMHG | OXYGEN SATURATION: 100 % | WEIGHT: 145 LBS | BODY MASS INDEX: 20.76 KG/M2

## 2018-02-12 PROCEDURE — 74011250636 HC RX REV CODE- 250/636: Performed by: ANESTHESIOLOGY

## 2018-02-12 PROCEDURE — 74011250636 HC RX REV CODE- 250/636

## 2018-02-12 PROCEDURE — 76040000025: Performed by: SURGERY

## 2018-02-12 PROCEDURE — 76060000031 HC ANESTHESIA FIRST 0.5 HR: Performed by: SURGERY

## 2018-02-12 RX ORDER — PROPOFOL 10 MG/ML
INJECTION, EMULSION INTRAVENOUS AS NEEDED
Status: DISCONTINUED | OUTPATIENT
Start: 2018-02-12 | End: 2018-02-12 | Stop reason: HOSPADM

## 2018-02-12 RX ORDER — SODIUM CHLORIDE, SODIUM LACTATE, POTASSIUM CHLORIDE, CALCIUM CHLORIDE 600; 310; 30; 20 MG/100ML; MG/100ML; MG/100ML; MG/100ML
100 INJECTION, SOLUTION INTRAVENOUS CONTINUOUS
Status: DISCONTINUED | OUTPATIENT
Start: 2018-02-12 | End: 2018-02-12 | Stop reason: HOSPADM

## 2018-02-12 RX ORDER — PROPOFOL 10 MG/ML
INJECTION, EMULSION INTRAVENOUS
Status: DISCONTINUED | OUTPATIENT
Start: 2018-02-12 | End: 2018-02-12 | Stop reason: HOSPADM

## 2018-02-12 RX ADMIN — SODIUM CHLORIDE, SODIUM LACTATE, POTASSIUM CHLORIDE, AND CALCIUM CHLORIDE 100 ML/HR: 600; 310; 30; 20 INJECTION, SOLUTION INTRAVENOUS at 09:13

## 2018-02-12 RX ADMIN — PROPOFOL 50 MG: 10 INJECTION, EMULSION INTRAVENOUS at 10:11

## 2018-02-12 RX ADMIN — PROPOFOL 160 MCG/KG/MIN: 10 INJECTION, EMULSION INTRAVENOUS at 10:11

## 2018-02-12 NOTE — ANESTHESIA PREPROCEDURE EVALUATION
Anesthetic History   No history of anesthetic complications            Review of Systems / Medical History  Patient summary reviewed and pertinent labs reviewed    Pulmonary  Within defined limits                 Neuro/Psych              Cardiovascular                  Exercise tolerance: >4 METS     GI/Hepatic/Renal  Within defined limits              Endo/Other      Hypothyroidism: well controlled  Arthritis and cancer (rectal)     Other Findings   Comments:  Insomnia  Colonic polyps  Anxiety  Fecal incontinence           Physical Exam    Airway  Mallampati: II  TM Distance: 4 - 6 cm  Neck ROM: normal range of motion   Mouth opening: Normal     Cardiovascular  Regular rate and rhythm,  S1 and S2 normal,  no murmur, click, rub, or gallop  Rhythm: regular  Rate: normal         Dental    Dentition: Caps/crowns     Pulmonary  Breath sounds clear to auscultation               Abdominal  GI exam deferred       Other Findings            Anesthetic Plan    ASA: 3  Anesthesia type: total IV anesthesia          Induction: Intravenous  Anesthetic plan and risks discussed with: Patient

## 2018-02-12 NOTE — IP AVS SNAPSHOT
[address] [facility] 
 
 
 [address] [facility] 
[phone] Patient: Noelle Mueller MRN: [id number] IDN:[id number] About your hospitalization You were admitted on:  February 12, 2018 You last received care in the:  D ENDOSCOPY You were discharged on:  February 12, 2018 Why you were hospitalized Your primary diagnosis was:  Not on File Follow-up Information None Your Scheduled Appointments Monday March 19, 2018  9:00 AM EDT  
CT CHEST ABDOMEN PELVIS W CONT with SFD CT 59 SLICE UNIT 1  
[facility] RADIOLOGY CT SCAN ([address] [place] [address]) [address] [facility]  
[phone] PATIENT ARRIVAL 1. Arrive 1.5 hours before exam time for registration and to drink oral contrast. 2. Please arrive well hydrated. Drink plenty of fluids the day before and the day of your study. Do not eat anything 4 hours before your arrival time, however please continue to drink clear liquids until 2 hours prior to the arrival time. 3. If diabetic, please bring a list of any diabetic medications. 4. Dialysis patients do not need to have hydration. It is recommended the patient have dialysis within 24 hours after the test.  
  
    
 Wednesday March 28, 2018 12:30 PM EDT  
LAB with [address]  
[address] [facility] Luigi Sosa [address] [place] [address]  
[phone] Wednesday March 28, 2018  1:00 PM EDT Follow Up with MD Haroon Nolan [facility] PURNIMA Bailon [address] [place] [id number]  
[phone] Discharge Orders None A check siomara indicates which time of day the medication should be taken. My Medications ASK your doctor about these medications Instructions Each Dose to Equal  
 Morning Noon Evening Bedtime  
 amitriptyline 100 mg tablet Commonly known as:  ELAVIL Your last dose was: Your next dose is: Take 100 mg by mouth nightly. Dr Giuliana Tapia psych  Indications: takes for sleep 100 mg Cholecalciferol (Vitamin D3) 2,000 unit Cap capsule Commonly known as:  VITAMIN D3 Your last dose was: Your next dose is: Take 2,000 Units by mouth two (2) times a day. 2000 Units  
    
   
   
   
  
 clonazePAM 2 mg tablet Commonly known as:  Adaline Apley Your last dose was: Your next dose is: Take 2 Tabs by mouth nightly. Max Daily Amount: 4 mg. Indications: takes for sleep 4 mg  
    
   
   
   
  
 levothyroxine 125 mcg tablet Commonly known as:  SYNTHROID Your last dose was: Your next dose is: Take 1 Tab by mouth Daily (before breakfast). Per anesthesia protocol:instructed to take am of surgery. Indications: hypothyroidism 125 mcg Discharge Instructions Gastrointestinal Colonoscopy/Flexible Sigmoidoscopy - Lower Exam Discharge Instructions 1. Call Dr. Charbel Dowling for any problems or questions. 2. Contact the doctors office for follow up appointment as directed 3. Medication may cause drowsiness for several hours, therefore, do not drive or operate machinery for remainder of the day. 4. No alcohol today. 5. Ordinarily, you may resume regular diet and activity after exam unless otherwise specified by your physician. 6. Because of air put into your colon during exam, you may experience some abdominal distension, relieved by the passage of gas, for several hours. 7. Contact your physician if you have any of the following: 
a. Excessive amount of bleeding  large amount when having a bowel movement. Occasional specks of blood with bowel movement would not be unusual. 
b. Severe abdominal pain 
c. Fever or Chills Any additional instructions:  Repeat in 3 years. Instructions given to Ramon Jensen and other family members. Instructions given by:  Dr. Earnestine Montesinos O Transitions of Care Introducing [facility] [address] Latoya Coats offers a voluntary care coordination program to provide high quality service and care to [facility] fee-for-service beneficiaries. Boo Narvaez was designed to help you enhance your health and well-being through the following services: ? Transitions of Care  support for individuals who are transitioning from one care setting to another (example: Hospital to home). ? Chronic and Complex Care Coordination  support for individuals and caregivers of those with serious or chronic illnesses or with more than one chronic (ongoing) condition and those who take a number of different medications. If you meet specific medical criteria, a [facility] [address] may call you directly to coordinate your care with your primary care physician and your other care providers. For questions about the [facility] programs, please, contact your physicians office. For general questions or additional information about Accountable Care Organizations: 
Please visit www.medicare.gov/acos. html or call 1-800-MEDICARE (1-985.468.7046) TTY users should call 7-586.580.5224. Introducing [facility] & HEALTH SERVICES! Dear [facility]: Thank you for requesting a [employer] account. Our records indicate that you already have an active [employer] account. You can access your account anytime at https://"[employer]. [employer]/"[employer] Did you know that you can access your hospital and ER discharge instructions at any time in [employer]? You can also review all of your test results from your hospital stay or ER visit. Additional Information If you have questions, please visit the Frequently Asked Questions section of the [employer] website at https://"[employer]. [employer]/. Remember, MyChart is NOT to be used for urgent needs. For medical emergencies, dial 911. Now available from your iPhone and Android! Providers Seen During Your Hospitalization Provider Specialty Primary office phone Margarita Cespedes MD General Surgery [phone] Your Primary Care Physician (PCP) Primary Care Physician Office Phone Office Fax Kathia Mendoza [phone] You are allergic to the following No active allergies Recent Documentation Height Weight BMI Smoking Status 1.778 m 65.8 kg 20.81 kg/m2 Former Smoker Emergency Contacts Name Discharge Info Relation Home Work Mobile [facility] CENTRAL DISCHARGE CAREGIVER [3] Friend [5] [phone] Pepe Rides  Sister [23] [phone] Patient Belongings The following personal items are in your possession at time of discharge: 
  Dental Appliances: None  Visual Aid: None Please provide this summary of care documentation to your next provider. Signatures-by signing, you are acknowledging that this After Visit Summary has been reviewed with you and you have received a copy. Patient Signature:  ____________________________________________________________ Date:  ____________________________________________________________  
  
Douglas Liriano Provider Signature:  ____________________________________________________________ Date:  ____________________________________________________________

## 2018-02-12 NOTE — PROCEDURES
Procedure in Detail:  Informed consent was obtained for the procedure. The patient was placed in the left lateral decubitus position and sedation was induced by anesthesia. The [id number] was inserted into the loop colostmoy and advanced under direct vision to the a distance of 25 cm to the ileocolostomy. A gastroscope was subsequently used to intubate the efferent limb and advance antegrade to the anus. The quality of the colonic preparation was excellent. A careful inspection was made as the colonoscope was withdrawn, including a retroflexed view of the rectum; findings and interventions are described below. Appropriate photodocumentation was obtained. Findings:   Rectum:   - Flat lesions:     - typical, mild, diversion colitis. anastomosis noted distally, patent. Sigmoid:   - Flat lesions:     - short remaining segment also revealed diversion colitis  Descending Colon: Normal  Transverse Colon:   - normal- total length of colon proximal to colostomy = 25cm    Specimens: No specimens were collected. Complications: None; patient tolerated the procedure well. \    EBL - none    Recommendations:   - Repeat colonoscopy in 3 years.      Signed By: Jeff Tyler MD                        February 12, 2018

## 2018-02-12 NOTE — ROUTINE PROCESS
Discharge instructions given to friend. IV discontinued with skin c/d/i. Pt has passed flatus and tolerating liquids well. Pt ready for discharge.

## 2018-02-12 NOTE — H&P
History and Physical      Patient: Randi Rojo    Physician: Duane Huitron MD    Referring Physician: Nickolas Larios DO    Chief Complaint: For colonoscopy    History of Present Illness: Pt presents for colonoscopy. History of rectal cancer and colon polyps; multiple operations for rectal cancer and post-operative surgical complications including leak, hernias, mesh infection. Brockton Puls     History:  Past Medical History:   Diagnosis Date    Family history of malignant neoplasm of gastrointestinal tract     mother colon/ovarian cancer 67    Fecal incontinence     LAR syndrome    Former cigarette smoker     History of rectal cancer     Hypothyroid     stable w/med    Infection and inflammatory reaction due to other internal prosthetic devices, implants and grafts, subsequent encounter 2017    abd wound/mesh colostomy    Insomnia     takes meds    Osteoarthritis, hand     Personal history of colonic polyps 9/2013    x 1    Personal history of malignant neoplasm of rectum, rectosigmoid junction, and anus 9/2013    Psychiatric disorder     anxiety     Past Surgical History:   Procedure Laterality Date    ENDOSCOPY, COLON, DIAGNOSTIC  last 2/3/15    [facility]--no polyps--3 year recall    HX COLECTOMY  11/2013    Emily--lap LAR with coloproctostomy, mobilization splenic flexure, diverting loop ileostomy    HX COLECTOMY Right 8/2014    for leak    HX COLOSTOMY  5/11/16    HX GI  4/2016    Sacral nerve stimlator lead trial (unsucessful) and removal    HX HERNIA REPAIR  3/2015, 5/2016    HX HERNIA REPAIR      and Colostomy in May    HX OTHER SURGICAL  10/7/2013    Emily---endorectal us    HX OTHER SURGICAL Bilateral     cataracts  2017    HX POLYPECTOMY      HX VASCULAR ACCESS Left 4/14    single lumen port/subsequently removed      Social History     Social History    Marital status: SINGLE     Spouse name: N/A    Number of children: N/A    Years of education: N/A     Social History Main Topics  Smoking status: Former Smoker     Quit date: 11/16/1963    Smokeless tobacco: Never Used    Alcohol use 7.0 oz/week     14 Cans of beer per week      Comment: advised stop drinking given issues    Drug use: No    Sexual activity: No     Other Topics Concern    None     Social History Narrative      Family History   Problem Relation Age of Onset    Cancer Mother      colon and ovarian    Cancer Brother      prostate    Alcohol abuse Brother     Cancer Maternal Grandmother      bone    Alcohol abuse Father     Alcohol abuse Sister     Stroke Paternal Grandfather        Medications:   Prior to Admission medications    Medication Sig Start Date End Date Taking? Authorizing Provider   Cholecalciferol, Vitamin D3, (VITAMIN D3) 2,000 unit cap capsule Take 2,000 Units by mouth two (2) times a day. 12/14/17  Yes Rashad Hughes,    levothyroxine (SYNTHROID) 125 mcg tablet Take 1 Tab by mouth Daily (before breakfast). Per anesthesia protocol:instructed to take am of surgery. Indications: hypothyroidism 9/6/17  Yes Roger Álvarez,    clonazePAM (KLONOPIN) 2 mg tablet Take 2 Tabs by mouth nightly. Max Daily Amount: 4 mg. Indications: takes for sleep  Patient taking differently: Take 4 mg by mouth nightly. Dr Parviz Segovia psych  Indications: takes for sleep 5/28/16  Yes Alexandra Fernández MD   amitriptyline (ELAVIL) 100 mg tablet Take 100 mg by mouth nightly. Dr Parviz Segovia psych  Indications: takes for sleep   Yes Historical Provider       Allergies: No Known Allergies    Physical Exam:     Vital Signs:   Visit Vitals    /84    Pulse 84    Temp 98.4 °F (36.9 °C)    Resp 16    Ht 5' 10\" (1.778 m)    Wt 145 lb (65.8 kg)    SpO2 96%    BMI 20.81 kg/m2     .     General: no distress      Heart: regular   Lungs: unlabored   Abdominal: soft   Neurological: Grossly normal        Findings/Diagnosis: Screening for colorectal cancer due to history of polyps and rectal cancer    Plan of Care/Planned Procedure: Colonoscopy, possible polypectomy. Pt/designee agree to proceed.       Signed:  Aissatou Butcher MD   2/12/2018

## 2018-02-12 NOTE — DISCHARGE INSTRUCTIONS
Gastrointestinal Colonoscopy/Flexible Sigmoidoscopy - Lower Exam Discharge Instructions  1. Call Dr. Curt Urias for any problems or questions. 2. Contact the doctors office for follow up appointment as directed  3. Medication may cause drowsiness for several hours, therefore, do not drive or operate machinery for remainder of the day. 4. No alcohol today. 5. Ordinarily, you may resume regular diet and activity after exam unless otherwise specified by your physician. 6. Because of air put into your colon during exam, you may experience some abdominal distension, relieved by the passage of gas, for several hours. 7. Contact your physician if you have any of the following:  a. Excessive amount of bleeding - large amount when having a bowel movement. Occasional specks of blood with bowel movement would not be unusual.  b. Severe abdominal pain  c. Fever or Chills    Any additional instructions:  Repeat in 3 years. Instructions given to Refugio Estes and other family members.   Instructions given by:  Dr. Curt Urias

## 2018-03-19 ENCOUNTER — HOSPITAL ENCOUNTER (OUTPATIENT)
Dept: CT IMAGING | Age: 73
Discharge: HOME | End: 2018-03-19
Attending: INTERNAL MEDICINE
Payer: MEDICARE

## 2018-03-19 VITALS — HEIGHT: 70 IN | WEIGHT: 140 LBS | BODY MASS INDEX: 20.04 KG/M2

## 2018-03-19 DIAGNOSIS — C20 RECTAL CANCER (HCC): ICD-10-CM

## 2018-03-19 LAB — CREAT BLD-MCNC: 1 MG/DL (ref 0.8–1.5)

## 2018-03-19 PROCEDURE — 74177 CT ABD & PELVIS W/CONTRAST: CPT

## 2018-03-19 PROCEDURE — 74011636320 HC RX REV CODE- 636/320: Performed by: INTERNAL MEDICINE

## 2018-03-19 PROCEDURE — 82565 ASSAY OF CREATININE: CPT

## 2018-03-19 PROCEDURE — 74011000258 HC RX REV CODE- 258: Performed by: INTERNAL MEDICINE

## 2018-03-19 RX ORDER — SODIUM CHLORIDE 0.9 % (FLUSH) 0.9 %
10 SYRINGE (ML) INJECTION
Status: COMPLETED | OUTPATIENT
Start: 2018-03-19 | End: 2018-03-19

## 2018-03-19 RX ADMIN — Medication 10 ML: at 09:14

## 2018-03-19 RX ADMIN — IOPAMIDOL 100 ML: 755 INJECTION, SOLUTION INTRAVENOUS at 09:14

## 2018-03-19 RX ADMIN — DIATRIZOATE MEGLUMINE AND DIATRIZOATE SODIUM 15 ML: 660; 100 LIQUID ORAL; RECTAL at 09:14

## 2018-03-19 RX ADMIN — SODIUM CHLORIDE 100 ML: 900 INJECTION, SOLUTION INTRAVENOUS at 09:14

## 2018-03-28 ENCOUNTER — HOSPITAL ENCOUNTER (OUTPATIENT)
Dept: LAB | Age: 73
Discharge: HOME | End: 2018-03-28
Payer: MEDICARE

## 2018-03-28 DIAGNOSIS — C20 RECTAL CANCER (HCC): ICD-10-CM

## 2018-03-28 LAB
ALBUMIN SERPL-MCNC: 3.7 G/DL (ref 3.2–4.6)
ALBUMIN/GLOB SERPL: 1 {RATIO} (ref 1.2–3.5)
ALP SERPL-CCNC: 89 U/L (ref 50–136)
ALT SERPL-CCNC: 36 U/L (ref 12–65)
ANION GAP SERPL CALC-SCNC: 6 MMOL/L (ref 7–16)
AST SERPL-CCNC: 31 U/L (ref 15–37)
BASOPHILS # BLD: 0 K/UL (ref 0–0.2)
BASOPHILS NFR BLD: 1 % (ref 0–2)
BILIRUB SERPL-MCNC: 0.5 MG/DL (ref 0.2–1.1)
BUN SERPL-MCNC: 22 MG/DL (ref 8–23)
CALCIUM SERPL-MCNC: 9 MG/DL (ref 8.3–10.4)
CEA SERPL-MCNC: 1.5 NG/ML (ref 0–3)
CHLORIDE SERPL-SCNC: 106 MMOL/L (ref 98–107)
CO2 SERPL-SCNC: 29 MMOL/L (ref 21–32)
CREAT SERPL-MCNC: 0.93 MG/DL (ref 0.8–1.5)
DIFFERENTIAL METHOD BLD: ABNORMAL
EOSINOPHIL # BLD: 0 K/UL (ref 0–0.8)
EOSINOPHIL NFR BLD: 1 % (ref 0.5–7.8)
ERYTHROCYTE [DISTWIDTH] IN BLOOD BY AUTOMATED COUNT: 13.6 % (ref 11.9–14.6)
GLOBULIN SER CALC-MCNC: 3.7 G/DL (ref 2.3–3.5)
GLUCOSE SERPL-MCNC: 87 MG/DL (ref 65–100)
HCT VFR BLD AUTO: 40.6 % (ref 41.1–50.3)
HGB BLD-MCNC: 13.3 G/DL (ref 13.6–17.2)
LYMPHOCYTES # BLD: 0.3 K/UL (ref 0.5–4.6)
LYMPHOCYTES NFR BLD: 12 % (ref 13–44)
MCH RBC QN AUTO: 30.6 PG (ref 26.1–32.9)
MCHC RBC AUTO-ENTMCNC: 32.8 G/DL (ref 31.4–35)
MCV RBC AUTO: 93.5 FL (ref 79.6–97.8)
MONOCYTES # BLD: 0.4 K/UL (ref 0.1–1.3)
MONOCYTES NFR BLD: 14 % (ref 4–12)
NEUTS SEG # BLD: 2.1 K/UL (ref 1.7–8.2)
NEUTS SEG NFR BLD: 72 % (ref 43–78)
NRBC # BLD: 0 K/UL (ref 0–0.2)
PLATELET # BLD AUTO: 185 K/UL (ref 150–450)
PMV BLD AUTO: 9.6 FL (ref 10.8–14.1)
POTASSIUM SERPL-SCNC: 4.6 MMOL/L (ref 3.5–5.1)
PROT SERPL-MCNC: 7.4 G/DL (ref 6.3–8.2)
RBC # BLD AUTO: 4.34 M/UL (ref 4.23–5.67)
SODIUM SERPL-SCNC: 141 MMOL/L (ref 136–145)
WBC # BLD AUTO: 2.9 K/UL (ref 4.3–11.1)

## 2018-03-28 PROCEDURE — 80053 COMPREHEN METABOLIC PANEL: CPT | Performed by: INTERNAL MEDICINE

## 2018-03-28 PROCEDURE — 85025 COMPLETE CBC W/AUTO DIFF WBC: CPT | Performed by: INTERNAL MEDICINE

## 2018-03-28 PROCEDURE — 36415 COLL VENOUS BLD VENIPUNCTURE: CPT | Performed by: INTERNAL MEDICINE

## 2018-03-28 PROCEDURE — 82378 CARCINOEMBRYONIC ANTIGEN: CPT | Performed by: INTERNAL MEDICINE

## 2018-09-27 ENCOUNTER — HOSPITAL ENCOUNTER (OUTPATIENT)
Dept: LAB | Age: 73
Discharge: HOME | End: 2018-09-27
Payer: MEDICARE

## 2018-09-27 DIAGNOSIS — C20 RECTAL CANCER (HCC): ICD-10-CM

## 2018-09-27 LAB
ALBUMIN SERPL-MCNC: 3.7 G/DL (ref 3.2–4.6)
ALBUMIN/GLOB SERPL: 1.1 {RATIO} (ref 1.2–3.5)
ALP SERPL-CCNC: 62 U/L (ref 50–136)
ALT SERPL-CCNC: 27 U/L (ref 12–65)
ANION GAP SERPL CALC-SCNC: ABNORMAL MMOL/L (ref 7–16)
AST SERPL-CCNC: 21 U/L (ref 15–37)
BASOPHILS # BLD: 0 K/UL (ref 0–0.2)
BASOPHILS NFR BLD: 1 % (ref 0–2)
BILIRUB SERPL-MCNC: 0.4 MG/DL (ref 0.2–1.1)
BUN SERPL-MCNC: 21 MG/DL (ref 8–23)
CALCIUM SERPL-MCNC: 9.1 MG/DL (ref 8.3–10.4)
CEA SERPL-MCNC: 1 NG/ML (ref 0–3)
CHLORIDE SERPL-SCNC: 105 MMOL/L (ref 98–107)
CO2 SERPL-SCNC: 28 MMOL/L (ref 21–32)
CREAT SERPL-MCNC: 1.07 MG/DL (ref 0.8–1.5)
DIFFERENTIAL METHOD BLD: ABNORMAL
EOSINOPHIL # BLD: 0 K/UL (ref 0–0.8)
EOSINOPHIL NFR BLD: 1 % (ref 0.5–7.8)
ERYTHROCYTE [DISTWIDTH] IN BLOOD BY AUTOMATED COUNT: 13.1 % (ref 11.9–14.6)
GLOBULIN SER CALC-MCNC: 3.4 G/DL (ref 2.3–3.5)
GLUCOSE SERPL-MCNC: 87 MG/DL (ref 65–100)
HCT VFR BLD AUTO: 39.1 % (ref 41.1–50.3)
HGB BLD-MCNC: 12.3 G/DL (ref 13.6–17.2)
IMM GRANULOCYTES # BLD: 0 K/UL (ref 0–0.5)
IMM GRANULOCYTES NFR BLD AUTO: 0 % (ref 0–5)
LYMPHOCYTES # BLD: 0.4 K/UL (ref 0.5–4.6)
LYMPHOCYTES NFR BLD: 12 % (ref 13–44)
MCH RBC QN AUTO: 29.3 PG (ref 26.1–32.9)
MCHC RBC AUTO-ENTMCNC: 31.5 G/DL (ref 31.4–35)
MCV RBC AUTO: 93.1 FL (ref 79.6–97.8)
MONOCYTES # BLD: 0.4 K/UL (ref 0.1–1.3)
MONOCYTES NFR BLD: 11 % (ref 4–12)
NEUTS SEG # BLD: 2.6 K/UL (ref 1.7–8.2)
NEUTS SEG NFR BLD: 76 % (ref 43–78)
NRBC # BLD: 0.01 K/UL (ref 0–0.2)
PLATELET # BLD AUTO: 171 K/UL (ref 150–450)
PMV BLD AUTO: 9.7 FL (ref 9.4–12.3)
POTASSIUM SERPL-SCNC: 4.5 MMOL/L (ref 3.5–5.1)
PROT SERPL-MCNC: 7.1 G/DL (ref 6.3–8.2)
RBC # BLD AUTO: 4.2 M/UL (ref 4.23–5.67)
SODIUM SERPL-SCNC: 131 MMOL/L (ref 136–145)
WBC # BLD AUTO: 3.4 K/UL (ref 4.3–11.1)

## 2018-09-27 PROCEDURE — 36415 COLL VENOUS BLD VENIPUNCTURE: CPT

## 2018-09-27 PROCEDURE — 82378 CARCINOEMBRYONIC ANTIGEN: CPT

## 2018-09-27 PROCEDURE — 85025 COMPLETE CBC W/AUTO DIFF WBC: CPT

## 2018-09-27 PROCEDURE — 80053 COMPREHEN METABOLIC PANEL: CPT

## 2019-03-20 ENCOUNTER — HOSPITAL ENCOUNTER (OUTPATIENT)
Dept: CT IMAGING | Age: 74
Discharge: HOME | DRG: 390 | End: 2019-03-20
Attending: INTERNAL MEDICINE
Payer: MEDICARE

## 2019-03-20 VITALS — WEIGHT: 140 LBS | HEIGHT: 68 IN | BODY MASS INDEX: 21.22 KG/M2

## 2019-03-20 DIAGNOSIS — C20 RECTAL CANCER (HCC): ICD-10-CM

## 2019-03-20 LAB — CREAT BLD-MCNC: 1.1 MG/DL (ref 0.8–1.5)

## 2019-03-20 PROCEDURE — 82565 ASSAY OF CREATININE: CPT

## 2019-03-20 PROCEDURE — 74011636320 HC RX REV CODE- 636/320: Performed by: INTERNAL MEDICINE

## 2019-03-20 PROCEDURE — 74177 CT ABD & PELVIS W/CONTRAST: CPT

## 2019-03-20 PROCEDURE — 74011000258 HC RX REV CODE- 258: Performed by: INTERNAL MEDICINE

## 2019-03-20 RX ORDER — SODIUM CHLORIDE 0.9 % (FLUSH) 0.9 %
10 SYRINGE (ML) INJECTION
Status: COMPLETED | OUTPATIENT
Start: 2019-03-20 | End: 2019-03-20

## 2019-03-20 RX ADMIN — IOPAMIDOL 100 ML: 755 INJECTION, SOLUTION INTRAVENOUS at 08:28

## 2019-03-20 RX ADMIN — SODIUM CHLORIDE 100 ML: 9 INJECTION, SOLUTION INTRAVENOUS at 08:28

## 2019-03-20 RX ADMIN — DIATRIZOATE MEGLUMINE AND DIATRIZOATE SODIUM 15 ML: 660; 100 LIQUID ORAL; RECTAL at 08:28

## 2019-03-20 RX ADMIN — Medication 10 ML: at 08:28

## 2019-03-22 ENCOUNTER — APPOINTMENT (OUTPATIENT)
Dept: CT IMAGING | Age: 74
DRG: 390 | End: 2019-03-22
Attending: SURGERY
Payer: MEDICARE

## 2019-03-22 ENCOUNTER — HOSPITAL ENCOUNTER (INPATIENT)
Age: 74
LOS: 4 days | Discharge: HOME | DRG: 390 | End: 2019-03-26
Attending: SURGERY | Admitting: SURGERY
Payer: MEDICARE

## 2019-03-22 PROBLEM — K56.609 SBO (SMALL BOWEL OBSTRUCTION) (HCC): Status: ACTIVE | Noted: 2019-03-22

## 2019-03-22 LAB
ANION GAP SERPL CALC-SCNC: 5 MMOL/L
BUN SERPL-MCNC: 14 MG/DL (ref 8–23)
CALCIUM SERPL-MCNC: 8.6 MG/DL (ref 8.3–10.4)
CHLORIDE SERPL-SCNC: 99 MMOL/L (ref 98–107)
CO2 SERPL-SCNC: 31 MMOL/L (ref 21–32)
CREAT SERPL-MCNC: 1.06 MG/DL (ref 0.8–1.5)
ERYTHROCYTE [DISTWIDTH] IN BLOOD BY AUTOMATED COUNT: 12.8 % (ref 11.9–14.6)
GLUCOSE SERPL-MCNC: 108 MG/DL (ref 65–100)
HCT VFR BLD AUTO: 39.6 % (ref 41.1–50.3)
HGB BLD-MCNC: 12.7 G/DL (ref 13.6–17.2)
MCH RBC QN AUTO: 29.4 PG (ref 26.1–32.9)
MCHC RBC AUTO-ENTMCNC: 32.1 G/DL (ref 31.4–35)
MCV RBC AUTO: 91.7 FL (ref 79.6–97.8)
NRBC # BLD: 0 K/UL (ref 0–0.2)
PLATELET # BLD AUTO: 152 K/UL (ref 150–450)
PMV BLD AUTO: 9.9 FL (ref 9.4–12.3)
POTASSIUM SERPL-SCNC: 4 MMOL/L (ref 3.5–5.1)
RBC # BLD AUTO: 4.32 M/UL (ref 4.23–5.6)
SODIUM SERPL-SCNC: 135 MMOL/L (ref 136–145)
WBC # BLD AUTO: 4.2 K/UL (ref 4.3–11.1)

## 2019-03-22 PROCEDURE — 74011250637 HC RX REV CODE- 250/637: Performed by: SURGERY

## 2019-03-22 PROCEDURE — 65270000029 HC RM PRIVATE

## 2019-03-22 PROCEDURE — 74011250636 HC RX REV CODE- 250/636: Performed by: SURGERY

## 2019-03-22 PROCEDURE — 80048 BASIC METABOLIC PNL TOTAL CA: CPT

## 2019-03-22 PROCEDURE — 85027 COMPLETE CBC AUTOMATED: CPT

## 2019-03-22 PROCEDURE — 36415 COLL VENOUS BLD VENIPUNCTURE: CPT

## 2019-03-22 PROCEDURE — 77030027138 HC INCENT SPIROMETER -A

## 2019-03-22 RX ORDER — CLONAZEPAM 1 MG/1
4 TABLET ORAL
Status: DISCONTINUED | OUTPATIENT
Start: 2019-03-22 | End: 2019-03-26 | Stop reason: HOSPADM

## 2019-03-22 RX ORDER — SODIUM CHLORIDE 0.9 % (FLUSH) 0.9 %
10 SYRINGE (ML) INJECTION
Status: DISCONTINUED | OUTPATIENT
Start: 2019-03-22 | End: 2019-03-22

## 2019-03-22 RX ORDER — HYOSCYAMINE SULFATE 0.12 MG/1
0.12 TABLET SUBLINGUAL
Status: DISCONTINUED | OUTPATIENT
Start: 2019-03-22 | End: 2019-03-26 | Stop reason: HOSPADM

## 2019-03-22 RX ORDER — DEXTROSE, SODIUM CHLORIDE, AND POTASSIUM CHLORIDE 5; .45; .15 G/100ML; G/100ML; G/100ML
100 INJECTION INTRAVENOUS CONTINUOUS
Status: DISCONTINUED | OUTPATIENT
Start: 2019-03-22 | End: 2019-03-26 | Stop reason: HOSPADM

## 2019-03-22 RX ORDER — OLANZAPINE 2.5 MG/1
7.5 TABLET ORAL
COMMUNITY

## 2019-03-22 RX ADMIN — DEXTROSE MONOHYDRATE, SODIUM CHLORIDE, AND POTASSIUM CHLORIDE 100 ML/HR: 50; 4.5; 1.49 INJECTION, SOLUTION INTRAVENOUS at 14:35

## 2019-03-22 RX ADMIN — CLONAZEPAM 4 MG: 1 TABLET ORAL at 22:26

## 2019-03-22 RX ADMIN — SODIUM PHOSPHATE 1 ENEMA: 7; 19 ENEMA RECTAL at 17:49

## 2019-03-22 RX ADMIN — DEXTROSE MONOHYDRATE, SODIUM CHLORIDE, AND POTASSIUM CHLORIDE 100 ML/HR: 50; 4.5; 1.49 INJECTION, SOLUTION INTRAVENOUS at 23:51

## 2019-03-22 NOTE — PROGRESS NOTES
03/22/19 1515 Dual Skin Pressure Injury Assessment Dual Skin Pressure Injury Assessment [facility] Second Care Provider (Based on [address] [place] [address]) Cira Ramesh

## 2019-03-23 PROCEDURE — 74011250637 HC RX REV CODE- 250/637: Performed by: NURSE PRACTITIONER

## 2019-03-23 PROCEDURE — 65270000029 HC RM PRIVATE

## 2019-03-23 PROCEDURE — 74011250637 HC RX REV CODE- 250/637: Performed by: SURGERY

## 2019-03-23 PROCEDURE — 74011250636 HC RX REV CODE- 250/636: Performed by: SURGERY

## 2019-03-23 RX ORDER — LEVOTHYROXINE SODIUM 125 UG/1
125 TABLET ORAL
Status: DISCONTINUED | OUTPATIENT
Start: 2019-03-23 | End: 2019-03-26 | Stop reason: HOSPADM

## 2019-03-23 RX ORDER — AMITRIPTYLINE HYDROCHLORIDE 50 MG/1
TABLET, FILM COATED ORAL
Status: ACTIVE
Start: 2019-03-23 | End: 2019-03-23

## 2019-03-23 RX ORDER — AMITRIPTYLINE HYDROCHLORIDE 50 MG/1
100 TABLET, FILM COATED ORAL
Status: DISCONTINUED | OUTPATIENT
Start: 2019-03-23 | End: 2019-03-26 | Stop reason: HOSPADM

## 2019-03-23 RX ORDER — OLANZAPINE 2.5 MG/1
2.5 TABLET ORAL 3 TIMES DAILY
Status: DISCONTINUED | OUTPATIENT
Start: 2019-03-23 | End: 2019-03-23

## 2019-03-23 RX ORDER — OLANZAPINE 2.5 MG/1
5 TABLET ORAL DAILY
Status: DISCONTINUED | OUTPATIENT
Start: 2019-03-24 | End: 2019-03-26 | Stop reason: HOSPADM

## 2019-03-23 RX ORDER — CLONAZEPAM 1 MG/1
2 TABLET ORAL DAILY
Status: DISCONTINUED | OUTPATIENT
Start: 2019-03-24 | End: 2019-03-26 | Stop reason: HOSPADM

## 2019-03-23 RX ORDER — OLANZAPINE 2.5 MG/1
2.5 TABLET ORAL
Status: DISCONTINUED | OUTPATIENT
Start: 2019-03-23 | End: 2019-03-26 | Stop reason: HOSPADM

## 2019-03-23 RX ADMIN — OLANZAPINE 2.5 MG: 2.5 TABLET, FILM COATED ORAL at 22:48

## 2019-03-23 RX ADMIN — AMITRIPTYLINE HYDROCHLORIDE 100 MG: 50 TABLET, FILM COATED ORAL at 01:38

## 2019-03-23 RX ADMIN — OLANZAPINE 2.5 MG: 2.5 TABLET, FILM COATED ORAL at 01:38

## 2019-03-23 RX ADMIN — CLONAZEPAM 4 MG: 1 TABLET ORAL at 22:48

## 2019-03-23 RX ADMIN — DEXTROSE MONOHYDRATE, SODIUM CHLORIDE, AND POTASSIUM CHLORIDE 100 ML/HR: 50; 4.5; 1.49 INJECTION, SOLUTION INTRAVENOUS at 20:33

## 2019-03-23 RX ADMIN — AMITRIPTYLINE HYDROCHLORIDE 100 MG: 50 TABLET, FILM COATED ORAL at 22:47

## 2019-03-23 RX ADMIN — DEXTROSE MONOHYDRATE, SODIUM CHLORIDE, AND POTASSIUM CHLORIDE 100 ML/HR: 50; 4.5; 1.49 INJECTION, SOLUTION INTRAVENOUS at 10:59

## 2019-03-23 RX ADMIN — LEVOTHYROXINE SODIUM 125 MCG: 125 TABLET ORAL at 14:19

## 2019-03-23 NOTE — PROGRESS NOTES
History and Physical 
 
3/23/2019 Patient: Cathy Marroquin 74 y.o. male Referring Physician:  Arpita Rosen DO Chief Complaint: abd pain, cramps, no stoma output History of Present Illness: Mr Rajni Aponte is a 75 yo male with a complicated surgical history stemming from rectal cancer several years ago, initially treated by Dr Tre King, most recently s/p removal of infected mesh and abdominal wall closure, who presents for elective admission due to abdominal pain. He was in his baseline state of health until 3 days ago when he began to have abdominal cramps and noted no stoma output. During the night of 3/20, he called the answering service and we discussed home management of possible SBO to include avoidance of oral intake other than roughly 2L of clear liquids to maintain hydration. With compliance to this, he noted no improvement and called today to request additional treatment. He has not had nausea or vomiting, but does not gradual, albeit mild, progressive distention. He has had similar episodes on at least 2 prior occasions but notes that the cramps were more severe, and symptoms resolved usually in a day or two with a vigorous and voluminous stool production per ostomy. 3/23/2019: some result with per stomal Fleets. Still distended History: 
 
Past Medical History:  
Diagnosis Date  Family history of malignant neoplasm of gastrointestinal tract   
 mother colon/ovarian cancer 67  Fecal incontinence LAR syndrome  Former cigarette smoker  History of rectal cancer  Hypothyroid   
 stable w/med  Infection and inflammatory reaction due to other internal prosthetic devices, implants and grafts, subsequent encounter 2017  
 abd wound/mesh colostomy  Insomnia   
 takes meds  Osteoarthritis, hand  Personal history of colonic polyps 9/2013  
 x 1  
 Personal history of malignant neoplasm of rectum, rectosigmoid junction, and anus 9/2013  Psychiatric disorder   
 anxiety Past Surgical History:  
Procedure Laterality Date  COLONOSCOPY N/A 2018 COLONOSCOPY / BMI=21 performed by Jhon Escalante MD at [facility] ENDOSCOPY  COLONOSCOPY THRU STOMA  2018  ENDOSCOPY, COLON, DIAGNOSTIC  last 2/3/15 Emily--no polyps--3 year recall  HX COLECTOMY  2013 Emily--lap LAR with coloproctostomy, mobilization splenic flexure, diverting loop ileostomy  HX COLECTOMY Right 2014  
 for leak  HX COLOSTOMY  16  HX GI  2016 Sacral nerve stimlator lead trial (unsucessful) and removal  
 HX HERNIA REPAIR  3/2015, 2016  HX HERNIA REPAIR    
 and Colostomy in May  HX OTHER SURGICAL  10/7/2013 Emily---endorectal us  HX OTHER SURGICAL Bilateral   
 cataracts    HX POLYPECTOMY  HX VASCULAR ACCESS Left   
 single lumen port/subsequently removed Family History Problem Relation Age of Onset  Cancer Mother   
     colon and ovarian  Cancer Brother   
     prostate  Alcohol abuse Brother  Cancer Maternal Grandmother   
     bone  Alcohol abuse Father  Alcohol abuse Sister  Stroke Paternal Grandfather Social History Socioeconomic History  Marital status: SINGLE Spouse name: Not on file  Number of children: Not on file  Years of education: Not on file  Highest education level: Not on file Occupational History  Not on file Social Needs  Financial resource strain: Not on file  Food insecurity:  
  Worry: Not on file Inability: Not on file  Transportation needs:  
  Medical: Not on file Non-medical: Not on file Tobacco Use  Smoking status: Former Smoker Last attempt to quit: 1963 Years since quittin.3  Smokeless tobacco: Never Used Substance and Sexual Activity  Alcohol use: Yes Alcohol/week: 7.0 oz Types: 14 Cans of beer per week Comment: advised stop drinking given issues  Drug use: No  
 Sexual activity: Never Lifestyle  Physical activity:  
  Days per week: Not on file Minutes per session: Not on file  Stress: Not on file Relationships  Social connections:  
  Talks on phone: Not on file Gets together: Not on file Attends Mandaeism service: Not on file Active member of club or organization: Not on file Attends meetings of clubs or organizations: Not on file Relationship status: Not on file  Intimate partner violence:  
  Fear of current or ex partner: Not on file Emotionally abused: Not on file Physically abused: Not on file Forced sexual activity: Not on file Other Topics Concern  Not on file Social History Narrative  Not on file Allergies: No Known Allergies Review of Systems: 
Constitutional: negative for fevers and chills Eyes: negative for icterus Respiratory: negative Cardiovascular: negative Gastrointestinal: positive for cramps, cessation of stoma output as per HPI Genitourinary:negative Integument/breast: negative Hematologic/lymphatic: negative Musculoskeletal:negative Prior to Admission Medications: 
 
Prior to Admission Medications Prescriptions Last Dose Informant Patient Reported? Taking? Cholecalciferol, Vitamin D3, (VITAMIN D3) 2,000 unit cap capsule   No No  
Sig: Take 2,000 Units by mouth two (2) times a day. OLANZapine (ZYPREXA) 2.5 mg tablet   Yes Yes Sig: Take 2.5 mg by mouth nightly. Indications: TAKES 3 PILLS qHS  
amitriptyline (ELAVIL) 100 mg tablet   Yes No  
Sig: Take 100 mg by mouth nightly. Dr Eliz Strange psych  Indications: takes for sleep  
clonazePAM (KLONOPIN) 2 mg tablet   No No  
Sig: Take 2 Tabs by mouth nightly. Max Daily Amount: 4 mg. Indications: takes for sleep Patient taking differently: Take 2 mg by mouth nightly. Dr Eliz Strange psych  Indications: takes for sleep-TAKES 3PILLS levothyroxine (SYNTHROID) 125 mcg tablet   No No  
Sig: TAKE ONE TABLET BY MOUTH ONE TIME DAILY BEFORE BREAKFAST Facility-Administered Medications: None Physical Exam: 
 
Blood pressure 132/78, pulse 63, temperature 98.4 °F (36.9 °C), resp. rate 19, SpO2 93 %. Physical Exam:  GENERAL: alert, cooperative, no distress, appears stated age, EYE: negative findings: anicteric sclera, THROAT & NECK: normal and no erythema or exudates noted. , LUNG: clear to auscultation bilaterally, HEART: regular rate and rhythm, S1, S2 normal, no murmur, click, rub or gallop, ABDOMEN: soft, non-tender. Bowel sounds absent. Mild distention. No masses,  no organomegaly, LLQ stoma EXTREMITIES:  extremities normal, atraumatic, no cyanosis or edema, SKIN: Normal., NEUROLOGIC: negative, PSYCHIATRIC: non focal 
 
Labs:  
No results found for this or any previous visit (from the past 24 hour(s)). Data Review CT Results (most recent): 
Results from Hospital Encounter encounter on 03/20/19 CT CHEST ABD PELV W CONT Narrative CT CHEST, ABDOMEN AND PELVIS WITH INTRAVENOUS CONTRAST DATED 3/20/2019. History: Colon cancer restaging. Comparison: CT chest, abdomen, and pelvis 3/19/2018 Technique:   Multiple contiguous helical CT images reconstructed at 5 mm were 
obtained from the base of the neck to the ischial tuberosities following oral 
and 100 cc Isovue-370 without acute complication. All CT scans performed at 
this facility use one or all of the following: Automated exposure control, 
adjustment of the mA and/or kVp according to patient's size, iterative 
reconstruction. Findings: 
CT Chest: The base of the neck is unremarkable in appearance. No axillary, mediastinal, 
or hilar lymphadenopathy is seen. The thoracic aorta is normal in caliber. Evaluation with lung windows demonstrates no evolving worrisome pulmonary 
nodules or masses. No pleural effusion is seen. Lungs are expanded without evidence for pneumothorax. No evolving aggressive osseous lesion is seen. CT ABDOMEN:   
The Liver is homogeneous in attenuation. The spleen is homogeneous in 
attenuation. No contour deforming or enhancing mass lesions are seen of the 
pancreas or adrenal glands. The gallbladder has an unremarkable CT appearance 
without radioopaque stones or pericholecystic fluid/inflammatory changes. The 
kidneys enhance symmetrically and no evidence of hydronephrosis is seen. The visualized loops of small bowel and colon are normal in caliber. The patient 
is status post right hemicolectomy. A left midabdomen ostomy is also seen. No 
free fluid and no free air is seen in the abdomen. No adenopathy is seen of the 
abdomen. The abdominal aorta demonstrates moderate atherosclerotic 
calcification. No evolving aggressive osseous lesion is seen. CT PELVIS: 
No abnormal pelvic fluid collections are present. No pelvic adenopathy is seen. The urinary bladder is unremarkable. No evolving aggressive osseous lesion is 
seen. Impression IMPRESSION:   
1. No findings concerning for evolving metastatic disease in the chest, 
abdomen, or pelvis. Assessment:  
 
Active Problems: 
  SBO (small bowel obstruction) ([address] (3/22/2019) Plan/Recommendations/Medical Decision Making:  
 
Hospital Day #2 Pt admitted for more aggressive treatment- will keep NPO and hydrate intravenously NGT not likely beneficial in absence of n/v 
CT by my review suggests subfascial fecal buildup proximal to ostomy- will also try stoma irrigation with enema in addition to NPO status. Surgical exploration will likely be very difficult given his complex surgical history. He is very aware of this. As such, will defer intervention as long as seems reasonable to give conservative measures a chance at success. He agrees. Inocente Seip, MD 
3/23/2019

## 2019-03-23 NOTE — PROGRESS NOTES
Pt requested Klonopin, which he normally takes for sleep. Dr. Meryl Omer, the on call physician notified and states pt can have klonopin for sleep and it was given at this time as ordered. Will continue to monitor.

## 2019-03-23 NOTE — H&P
History and Physical 
 
3/22/2019 Patient: Blue Marroquin 74 y.o. male Referring Physician:  Ki Braswell DO Chief Complaint: abd pain, cramps, no stoma output History of Present Illness: Mr Mariluz Headley is a 75 yo male with a complicated surgical history stemming from rectal cancer several years ago, initially treated by Dr Yuni Aguilar, most recently s/p removal of infected mesh and abdominal wall closure, who presents for elective admission due to abdominal pain. He was in his baseline state of health until 3 days ago when he began to have abdominal cramps and noted no stoma output. During the night of 3/20, he called the answering service and we discussed home management of possible SBO to include avoidance of oral intake other than roughly 2L of clear liquids to maintain hydration. With compliance to this, he noted no improvement and called today to request additional treatment. He has not had nausea or vomiting, but does not gradual, albeit mild, progressive distention. He has had similar episodes on at least 2 prior occasions but notes that the cramps were more severe, and symptoms resolved usually in a day or two with a vigorous and voluminous stool production per ostomy. History: 
 
Past Medical History:  
Diagnosis Date  Family history of malignant neoplasm of gastrointestinal tract   
 mother colon/ovarian cancer 67  Fecal incontinence LAR syndrome  Former cigarette smoker  History of rectal cancer  Hypothyroid   
 stable w/med  Infection and inflammatory reaction due to other internal prosthetic devices, implants and grafts, subsequent encounter 2017  
 abd wound/mesh colostomy  Insomnia   
 takes meds  Osteoarthritis, hand  Personal history of colonic polyps 9/2013  
 x 1  
 Personal history of malignant neoplasm of rectum, rectosigmoid junction, and anus 9/2013  Psychiatric disorder   
 anxiety Past Surgical History: Procedure Laterality Date  COLONOSCOPY N/A 2018 COLONOSCOPY / BMI=21 performed by Mindi Nevarez MD at [facility] ENDOSCOPY  COLONOSCOPY THRU STOMA  2018  ENDOSCOPY, COLON, DIAGNOSTIC  last 2/3/15 [facility]--no polyps--3 year recall  HX COLECTOMY  2013 [facility]--lap LAR with coloproctostomy, mobilization splenic flexure, diverting loop ileostomy  HX COLECTOMY Right 2014  
 for leak  HX COLOSTOMY  16  HX GI  2016 Sacral nerve stimlator lead trial (unsucessful) and removal  
 HX HERNIA REPAIR  3/2015, 2016  HX HERNIA REPAIR    
 and Colostomy in May  HX OTHER SURGICAL  10/7/2013 [facility]---endorectal us  HX OTHER SURGICAL Bilateral   
 cataracts    HX POLYPECTOMY  HX VASCULAR ACCESS Left   
 single lumen port/subsequently removed Family History Problem Relation Age of Onset  Cancer Mother   
     colon and ovarian  Cancer Brother   
     prostate  Alcohol abuse Brother  Cancer Maternal Grandmother   
     bone  Alcohol abuse Father  Alcohol abuse Sister  Stroke Paternal Grandfather Social History Socioeconomic History  Marital status: SINGLE Spouse name: Not on file  Number of children: Not on file  Years of education: Not on file  Highest education level: Not on file Occupational History  Not on file Social Needs  Financial resource strain: Not on file  Food insecurity:  
  Worry: Not on file Inability: Not on file  Transportation needs:  
  Medical: Not on file Non-medical: Not on file Tobacco Use  Smoking status: Former Smoker Last attempt to quit: 1963 Years since quittin.3  Smokeless tobacco: Never Used Substance and Sexual Activity  Alcohol use: Yes Alcohol/week: 7.0 oz Types: 14 Cans of beer per week Comment: advised stop drinking given issues  Drug use: No  
 Sexual activity: Never Lifestyle  Physical activity:  
  Days per week: Not on file Minutes per session: Not on file  Stress: Not on file Relationships  Social connections:  
  Talks on phone: Not on file Gets together: Not on file Attends [employer] service: Not on file Active member of club or organization: Not on file Attends meetings of clubs or organizations: Not on file Relationship status: Not on file  Intimate partner violence:  
  Fear of current or ex partner: Not on file Emotionally abused: Not on file Physically abused: Not on file Forced sexual activity: Not on file Other Topics Concern  Not on file Social History Narrative  Not on file Allergies: No Known Allergies Review of Systems: 
Constitutional: negative for fevers and chills Eyes: negative for icterus Respiratory: negative Cardiovascular: negative Gastrointestinal: positive for cramps, cessation of stoma output as per HPI Genitourinary:negative Integument/breast: negative Hematologic/lymphatic: negative Musculoskeletal:negative Prior to Admission Medications: 
 
Prior to Admission Medications Prescriptions Last Dose Informant Patient Reported? Taking? Cholecalciferol, Vitamin D3, (VITAMIN D3) 2,000 unit cap capsule   No No  
Sig: Take 2,000 Units by mouth two (2) times a day. OLANZapine (ZYPREXA) 2.5 mg tablet   Yes Yes Sig: Take 2.5 mg by mouth nightly. Indications: TAKES 3 PILLS qHS  
amitriptyline (ELAVIL) 100 mg tablet   Yes No  
Sig: Take 100 mg by mouth nightly. Dr Ariella Lopez psych  Indications: takes for sleep  
clonazePAM (KLONOPIN) 2 mg tablet   No No  
Sig: Take 2 Tabs by mouth nightly. Max Daily Amount: 4 mg. Indications: takes for sleep Patient taking differently: Take 2 mg by mouth nightly. Dr Ariella Lopez psych  Indications: takes for sleep-TAKES 3PILLS  
levothyroxine (SYNTHROID) 125 mcg tablet   No No  
Sig: TAKE ONE TABLET BY MOUTH ONE TIME DAILY BEFORE BREAKFAST Facility-Administered Medications: None Physical Exam: 
 
Blood pressure 143/82, pulse 68, temperature 98.7 °F (37.1 °C), resp. rate 18, SpO2 94 %. Physical Exam:  GENERAL: alert, cooperative, no distress, appears stated age, EYE: negative findings: anicteric sclera, THROAT & NECK: normal and no erythema or exudates noted. , LUNG: clear to auscultation bilaterally, HEART: regular rate and rhythm, S1, S2 normal, no murmur, click, rub or gallop, ABDOMEN: soft, non-tender. Bowel sounds absent. Mild distention. No masses,  no organomegaly, EXTREMITIES:  extremities normal, atraumatic, no cyanosis or edema, SKIN: Normal., NEUROLOGIC: negative, PSYCHIATRIC: non focal 
 
Labs:  
Recent Results (from the past 24 hour(s)) CBC W/O DIFF Collection Time: 03/22/19  3:20 PM  
Result Value Ref Range WBC 4.2 (L) 4.3 - 11.1 K/uL  
 RBC 4.32 4.23 - 5.6 M/uL  
 HGB 12.7 (L) 13.6 - 17.2 g/dL HCT 39.6 (L) 41.1 - 50.3 % MCV 91.7 79.6 - 97.8 FL  
 MCH 29.4 26.1 - 32.9 PG  
 MCHC 32.1 31.4 - 35.0 g/dL  
 RDW 12.8 11.9 - 14.6 % PLATELET 963 672 - 768 K/uL MPV 9.9 9.4 - 12.3 FL ABSOLUTE NRBC 0.00 0.0 - 0.2 K/uL METABOLIC PANEL, BASIC Collection Time: 03/22/19  3:20 PM  
Result Value Ref Range Sodium 135 (L) 136 - 145 mmol/L Potassium 4.0 3.5 - 5.1 mmol/L Chloride 99 98 - 107 mmol/L  
 CO2 31 21 - 32 mmol/L Anion gap 5 mmol/L Glucose 108 (H) 65 - 100 mg/dL BUN 14 8 - 23 MG/DL Creatinine 1.06 0.8 - 1.5 MG/DL  
 GFR est AA >60 >60 ml/min/1.73m2 GFR est non-AA >60 ml/min/1.73m2 Calcium 8.6 8.3 - 10.4 MG/DL Data Review CT Results (most recent): 
Results from Hospital Encounter encounter on 03/20/19 CT CHEST ABD PELV W CONT Narrative CT CHEST, ABDOMEN AND PELVIS WITH INTRAVENOUS CONTRAST DATED 3/20/2019. History: Colon cancer restaging. Comparison: CT chest, abdomen, and pelvis 3/19/2018 Technique:   Multiple contiguous helical CT images reconstructed at 5 mm were 
obtained from the base of the neck to the ischial tuberosities following oral 
and 100 cc Isovue-370 without acute complication. All CT scans performed at 
this facility use one or all of the following: Automated exposure control, 
adjustment of the mA and/or kVp according to patient's size, iterative 
reconstruction. Findings: 
CT Chest: The base of the neck is unremarkable in appearance. No axillary, mediastinal, 
or hilar lymphadenopathy is seen. The thoracic aorta is normal in caliber. Evaluation with lung windows demonstrates no evolving worrisome pulmonary 
nodules or masses. No pleural effusion is seen. Lungs are expanded without 
evidence for pneumothorax. No evolving aggressive osseous lesion is seen. CT ABDOMEN:   
The Liver is homogeneous in attenuation. The spleen is homogeneous in 
attenuation. No contour deforming or enhancing mass lesions are seen of the 
pancreas or adrenal glands. The gallbladder has an unremarkable CT appearance 
without radioopaque stones or pericholecystic fluid/inflammatory changes. The 
kidneys enhance symmetrically and no evidence of hydronephrosis is seen. The visualized loops of small bowel and colon are normal in caliber. The patient 
is status post right hemicolectomy. A left midabdomen ostomy is also seen. No 
free fluid and no free air is seen in the abdomen. No adenopathy is seen of the 
abdomen. The abdominal aorta demonstrates moderate atherosclerotic 
calcification. No evolving aggressive osseous lesion is seen. CT PELVIS: 
No abnormal pelvic fluid collections are present. No pelvic adenopathy is seen. The urinary bladder is unremarkable. No evolving aggressive osseous lesion is 
seen. Impression IMPRESSION:   
1. No findings concerning for evolving metastatic disease in the chest, 
abdomen, or pelvis. Assessment: Active Problems: 
  SBO (small bowel obstruction) ([facility] [place] [address] (3/22/2019) Plan/Recommendations/Medical Decision Making:  
 
Pt admitted for more aggressive treatment- will keep NPO and hydrate intravenously NGT not likely beneficial in absence of n/v 
CT by my review suggests subfascial fecal buildup proximal to ostomy- will also try stoma irrigation with enema in addition to NPO status. Surgical exploration will likely be very difficult given his complex surgical history. He is very aware of this. As such, will defer intervention as long as seems reasonable to give conservative measures a chance at success. He agrees. Keenan Macias MD 
3/22/2019

## 2019-03-23 NOTE — PROGRESS NOTES
Shift assessment complete. Patient A&O x 4. Respirations present, even and unlabored. Breath sounds clear. Patient on room air. Heart sounds regular. Abdomen soft and tender. Patient needs met. No distress noted. Bed low and locked. Call light within reach. Will continue to monitor hourly during shift.

## 2019-03-24 PROCEDURE — 74011250637 HC RX REV CODE- 250/637: Performed by: SURGERY

## 2019-03-24 PROCEDURE — 74011250636 HC RX REV CODE- 250/636: Performed by: SURGERY

## 2019-03-24 PROCEDURE — 74011250637 HC RX REV CODE- 250/637: Performed by: NURSE PRACTITIONER

## 2019-03-24 PROCEDURE — 65270000029 HC RM PRIVATE

## 2019-03-24 RX ADMIN — LEVOTHYROXINE SODIUM 125 MCG: 125 TABLET ORAL at 06:21

## 2019-03-24 RX ADMIN — DEXTROSE MONOHYDRATE, SODIUM CHLORIDE, AND POTASSIUM CHLORIDE 100 ML/HR: 50; 4.5; 1.49 INJECTION, SOLUTION INTRAVENOUS at 06:25

## 2019-03-24 RX ADMIN — DEXTROSE MONOHYDRATE, SODIUM CHLORIDE, AND POTASSIUM CHLORIDE 100 ML/HR: 50; 4.5; 1.49 INJECTION, SOLUTION INTRAVENOUS at 15:45

## 2019-03-24 RX ADMIN — SODIUM PHOSPHATE 1 ENEMA: 7; 19 ENEMA RECTAL at 16:00

## 2019-03-24 RX ADMIN — AMITRIPTYLINE HYDROCHLORIDE 100 MG: 50 TABLET, FILM COATED ORAL at 22:47

## 2019-03-24 RX ADMIN — CLONAZEPAM 2 MG: 1 TABLET ORAL at 02:02

## 2019-03-24 RX ADMIN — OLANZAPINE 2.5 MG: 2.5 TABLET, FILM COATED ORAL at 22:47

## 2019-03-24 RX ADMIN — CLONAZEPAM 4 MG: 1 TABLET ORAL at 22:48

## 2019-03-24 RX ADMIN — OLANZAPINE 5 MG: 2.5 TABLET, FILM COATED ORAL at 02:03

## 2019-03-24 NOTE — PROGRESS NOTES
Report received from off going RN. Patient resting comfortably in room. No distress observed. A/O x4 Incisions. ..left colosomy bag changed and site care completed. Pain. ...controlled Position. ..resting in bed Potty. .... Kaitlynn Louis voids in urinal and has colonoscopy.

## 2019-03-24 NOTE — PROGRESS NOTES
Shift assessment complete. Patient A&O x 4. Respirations present, even and unlabored. Breath sounds clear. Patient on room air. Heart sounds irregular. Bowel sounds hypoactive. Abdomen soft and tender. Patient needs met. No distress noted. Bed low and locked. Call light within reach. Will continue to monitor hourly during shift.

## 2019-03-24 NOTE — PROGRESS NOTES
History and Physical 
 
3/24/2019 Patient: Tracy Marroquin 74 y.o. male Referring Physician:  Spencer Mckeon DO Chief Complaint: abd pain, cramps, no stoma output History of Present Illness: Mr Oscar Doll is a 75 yo male with a complicated surgical history stemming from rectal cancer several years ago, initially treated by Dr Danielle Schuler, most recently s/p removal of infected mesh and abdominal wall closure, who presents for elective admission due to abdominal pain. He was in his baseline state of health until 3 days ago when he began to have abdominal cramps and noted no stoma output. During the night of 3/20, he called the answering service and we discussed home management of possible SBO to include avoidance of oral intake other than roughly 2L of clear liquids to maintain hydration. With compliance to this, he noted no improvement and called today to request additional treatment. He has not had nausea or vomiting, but does not gradual, albeit mild, progressive distention. He has had similar episodes on at least 2 prior occasions but notes that the cramps were more severe, and symptoms resolved usually in a day or two with a vigorous and voluminous stool production per ostomy. 3/23/2019: some result with per stomal Fleets. Still distended 3/24/2019: small amount of hard feces. Still distended History: 
 
Past Medical History:  
Diagnosis Date  Family history of malignant neoplasm of gastrointestinal tract   
 mother colon/ovarian cancer 67  Fecal incontinence LAR syndrome  Former cigarette smoker  History of rectal cancer  Hypothyroid   
 stable w/med  Infection and inflammatory reaction due to other internal prosthetic devices, implants and grafts, subsequent encounter 2017  
 abd wound/mesh colostomy  Insomnia   
 takes meds  Osteoarthritis, hand  Personal history of colonic polyps 9/2013  
 x 1  
  Personal history of malignant neoplasm of rectum, rectosigmoid junction, and anus 2013  Psychiatric disorder   
 anxiety Past Surgical History:  
Procedure Laterality Date  COLONOSCOPY N/A 2018 COLONOSCOPY / BMI=21 performed by Esau [facility]erra MD at [facility] ENDOSCOPY  COLONOSCOPY THRU STOMA  2018  ENDOSCOPY, COLON, DIAGNOSTIC  last 2/3/15 [facility]--no polyps--3 year recall  HX COLECTOMY  2013 [facility]--lap LAR with coloproctostomy, mobilization splenic flexure, diverting loop ileostomy  HX COLECTOMY Right 2014  
 for leak  HX COLOSTOMY  16  HX GI  2016 Sacral nerve stimlator lead trial (unsucessful) and removal  
 HX HERNIA REPAIR  3/2015, 2016  HX HERNIA REPAIR    
 and Colostomy in May  HX OTHER SURGICAL  10/7/2013 [facility]---endorectal us  HX OTHER SURGICAL Bilateral   
 cataracts    HX POLYPECTOMY  HX VASCULAR ACCESS Left   
 single lumen port/subsequently removed Family History Problem Relation Age of Onset  Cancer Mother   
     colon and ovarian  Cancer Brother   
     prostate  Alcohol abuse Brother  Cancer Maternal Grandmother   
     bone  Alcohol abuse Father  Alcohol abuse Sister  Stroke Paternal Grandfather Social History Socioeconomic History  Marital status: SINGLE Spouse name: Not on file  Number of children: Not on file  Years of education: Not on file  Highest education level: Not on file Occupational History  Not on file Social Needs  Financial resource strain: Not on file  Food insecurity:  
  Worry: Not on file Inability: Not on file  Transportation needs:  
  Medical: Not on file Non-medical: Not on file Tobacco Use  Smoking status: Former Smoker Last attempt to quit: 1963 Years since quittin.3  Smokeless tobacco: Never Used Substance and Sexual Activity  Alcohol use:  Yes  
 Alcohol/week: 7.0 oz Types: 14 Cans of beer per week Comment: advised stop drinking given issues  Drug use: No  
 Sexual activity: Never Lifestyle  Physical activity:  
  Days per week: Not on file Minutes per session: Not on file  Stress: Not on file Relationships  Social connections:  
  Talks on phone: Not on file Gets together: Not on file Attends Mandaen service: Not on file Active member of club or organization: Not on file Attends meetings of clubs or organizations: Not on file Relationship status: Not on file  Intimate partner violence:  
  Fear of current or ex partner: Not on file Emotionally abused: Not on file Physically abused: Not on file Forced sexual activity: Not on file Other Topics Concern  Not on file Social History Narrative  Not on file Allergies: No Known Allergies Review of Systems: 
Constitutional: negative for fevers and chills Eyes: negative for icterus Respiratory: negative Cardiovascular: negative Gastrointestinal: positive for cramps, cessation of stoma output as per HPI Genitourinary:negative Integument/breast: negative Hematologic/lymphatic: negative Musculoskeletal:negative Prior to Admission Medications: 
 
Prior to Admission Medications Prescriptions Last Dose Informant Patient Reported? Taking? Cholecalciferol, Vitamin D3, (VITAMIN D3) 2,000 unit cap capsule   No No  
Sig: Take 2,000 Units by mouth two (2) times a day. OLANZapine (ZYPREXA) 2.5 mg tablet   Yes Yes Sig: Take 2.5 mg by mouth nightly. Indications: TAKES 3 PILLS qHS  
amitriptyline (ELAVIL) 100 mg tablet   Yes No  
Sig: Take 100 mg by mouth nightly. Dr Angela Goldman psych  Indications: takes for sleep  
clonazePAM (KLONOPIN) 2 mg tablet   No No  
Sig: Take 2 Tabs by mouth nightly. Max Daily Amount: 4 mg. Indications: takes for sleep Patient taking differently: Take 2 mg by mouth nightly.  Dr Angela Goldman psych Indications: takes for sleep-TAKES 3PILLS  
levothyroxine (SYNTHROID) 125 mcg tablet   No No  
Sig: TAKE ONE TABLET BY MOUTH ONE TIME DAILY BEFORE BREAKFAST Facility-Administered Medications: None Physical Exam: 
 
Blood pressure 164/89, pulse 73, temperature 97.3 °F (36.3 °C), resp. rate 20, weight 62.1 kg (137 lb), SpO2 95 %. Physical Exam:  GENERAL: alert, cooperative, no distress, appears stated age, EYE: negative findings: anicteric sclera, THROAT & NECK: normal and no erythema or exudates noted. , LUNG: clear to auscultation bilaterally, HEART: regular rate and rhythm, S1, S2 normal, no murmur, click, rub or gallop, ABDOMEN: soft, non-tender. Bowel sounds absent. Mild distention. No masses,  no organomegaly, LLQ stoma EXTREMITIES:  extremities normal, atraumatic, no cyanosis or edema, SKIN: Normal., NEUROLOGIC: negative, PSYCHIATRIC: non focal 
 
Labs:  
No results found for this or any previous visit (from the past 24 hour(s)). Data Review CT Results (most recent): 
Results from Hospital Encounter encounter on 03/20/19 CT CHEST ABD PELV W CONT Narrative CT CHEST, ABDOMEN AND PELVIS WITH INTRAVENOUS CONTRAST DATED 3/20/2019. History: Colon cancer restaging. Comparison: CT chest, abdomen, and pelvis 3/19/2018 Technique:   Multiple contiguous helical CT images reconstructed at 5 mm were 
obtained from the base of the neck to the ischial tuberosities following oral 
and 100 cc Isovue-370 without acute complication. All CT scans performed at 
this facility use one or all of the following: Automated exposure control, 
adjustment of the mA and/or kVp according to patient's size, iterative 
reconstruction. Findings: 
CT Chest: The base of the neck is unremarkable in appearance. No axillary, mediastinal, 
or hilar lymphadenopathy is seen. The thoracic aorta is normal in caliber. Evaluation with lung windows demonstrates no evolving worrisome pulmonary nodules or masses. No pleural effusion is seen. Lungs are expanded without 
evidence for pneumothorax. No evolving aggressive osseous lesion is seen. CT ABDOMEN:   
The Liver is homogeneous in attenuation. The spleen is homogeneous in 
attenuation. No contour deforming or enhancing mass lesions are seen of the 
pancreas or adrenal glands. The gallbladder has an unremarkable CT appearance 
without radioopaque stones or pericholecystic fluid/inflammatory changes. The 
kidneys enhance symmetrically and no evidence of hydronephrosis is seen. The visualized loops of small bowel and colon are normal in caliber. The patient 
is status post right hemicolectomy. A left midabdomen ostomy is also seen. No 
free fluid and no free air is seen in the abdomen. No adenopathy is seen of the 
abdomen. The abdominal aorta demonstrates moderate atherosclerotic 
calcification. No evolving aggressive osseous lesion is seen. CT PELVIS: 
No abnormal pelvic fluid collections are present. No pelvic adenopathy is seen. The urinary bladder is unremarkable. No evolving aggressive osseous lesion is 
seen. Impression IMPRESSION:   
1. No findings concerning for evolving metastatic disease in the chest, 
abdomen, or pelvis. Assessment:  
 
Active Problems: 
  SBO (small bowel obstruction) ([address] (3/22/2019) Plan/Recommendations/Medical Decision Making:  
 
Hospital Day #3 Pt admitted for more aggressive treatment- will keep start clear liquids and continue to hydrate intravenously Repeat Fleets per stoma CT by my review suggests subfascial fecal buildup proximal to ostomy- will also try stoma irrigation with enema in addition to NPO status. Surgical exploration will likely be very difficult given his complex surgical history. He is very aware of this. As such, will defer intervention as long as seems reasonable to give conservative measures a chance at success. He agrees. Leandra Ventura MD 
3/24/2019

## 2019-03-24 NOTE — PROGRESS NOTES
Nutrition Assessment for:   
Best Practice Alert for Malnutrition Screening Tool positive for weight loss of 2-13 pounds and decreased intake due to appetite. Assessment: Pt with a history of rectal cancer and recently s/p removal of infected mesh and abdominal wall closure. Pt presented with persisitent abdominal pain after following a home trial (under MD direction) of clear liquids (2L/day) beginning 3/20 to address possible SBO. Pt notes no stoma output with onset of abdominal cramps. No N/V noted. Pt admitted for conservative treatment of SBO considering medical surgical history per MD progress notes. Current diet order: DIET NPO (3/22) Nutritionally Pertinent Meds: Reviewed; unremarkable. IV Fluids: Dextrose 5%0. 9%NaCl @ 100ml providing 408 non-protein kcal/day. Anthropometrics: Ht: 5'10\" (1.778m) Wt: 142 lb (64.4kg) BMI: 20.1 (underweight BMI for pt>65 years; BMI: 23-30 recommended) Weight history in EMR: 
WT / BMI WEIGHT BODY MASS INDEX  
3/20/2019 140 lb 21.29 kg/m2  
12/14/2018 142 lb 20.37 kg/m2 9/27/2018 139 lb 19.94 kg/m2 3/28/2018 134 lb 6.4 oz 19.28 kg/m2 3/19/2018 140 lb 20.09 kg/m2 2/12/2018 145 lb 20.81 kg/m2 9/28/2017 141 lb 21.44 kg/m2 9/14/2017 136 lb 20.68 kg/m2 5/2/2017 131 lb 19.92 kg/m2 3/30/2017 141 lb 8 oz 21.52 kg/m2 Macronutrient needs: EER: 6074-3515 kcal/day EPR: 64-77 g/day (1-1.2 g/kg IBW) Intake/Comparative Standards: Patient NPO. Receiving 408 non-protein kcals via IV fluids. Inadequate to meet estimated nutritional needs. Malnutrition Criteria: Ordered weight for accurate data. Nutrition Diagnosis: 
Inadequate oral intake related to altered GI tract as evidenced by SBO with NPO diet order. [place] Nutrition Intervention: 
Meals and Snacks: NPO diet order. NPO diet status Day 3 while admitted with 2 days clear liquids at home pta. Will await start or progression of po diet as medical condition dictates. Specialized Nutrition Support:  RD available to manage as consulted. It is reasonable to wait 7-10 days before initiating TPN in a patient with no or low nutrition risk. Nutrition Discharge Needs/Plan:Too soon to determine. Jessica Sung MS, RD, CSSD, LD 
[phone]

## 2019-03-25 PROCEDURE — 74011250636 HC RX REV CODE- 250/636: Performed by: SURGERY

## 2019-03-25 PROCEDURE — 65270000029 HC RM PRIVATE

## 2019-03-25 PROCEDURE — 74011250637 HC RX REV CODE- 250/637: Performed by: SURGERY

## 2019-03-25 PROCEDURE — 74011250637 HC RX REV CODE- 250/637: Performed by: NURSE PRACTITIONER

## 2019-03-25 PROCEDURE — 74011000302 HC RX REV CODE- 302: Performed by: SURGERY

## 2019-03-25 PROCEDURE — 86580 TB INTRADERMAL TEST: CPT | Performed by: SURGERY

## 2019-03-25 RX ADMIN — CLONAZEPAM 2 MG: 1 TABLET ORAL at 01:57

## 2019-03-25 RX ADMIN — DEXTROSE MONOHYDRATE, SODIUM CHLORIDE, AND POTASSIUM CHLORIDE 100 ML/HR: 50; 4.5; 1.49 INJECTION, SOLUTION INTRAVENOUS at 22:09

## 2019-03-25 RX ADMIN — TUBERCULIN PURIFIED PROTEIN DERIVATIVE 5 UNITS: 5 INJECTION INTRADERMAL at 12:00

## 2019-03-25 RX ADMIN — AMITRIPTYLINE HYDROCHLORIDE 100 MG: 50 TABLET, FILM COATED ORAL at 22:10

## 2019-03-25 RX ADMIN — OLANZAPINE 5 MG: 2.5 TABLET, FILM COATED ORAL at 01:57

## 2019-03-25 RX ADMIN — SODIUM PHOSPHATE 1 ENEMA: 7; 19 ENEMA RECTAL at 13:53

## 2019-03-25 RX ADMIN — CLONAZEPAM 4 MG: 1 TABLET ORAL at 22:10

## 2019-03-25 RX ADMIN — LEVOTHYROXINE SODIUM 125 MCG: 125 TABLET ORAL at 06:16

## 2019-03-25 RX ADMIN — OLANZAPINE 2.5 MG: 2.5 TABLET, FILM COATED ORAL at 22:10

## 2019-03-25 RX ADMIN — DEXTROSE MONOHYDRATE, SODIUM CHLORIDE, AND POTASSIUM CHLORIDE 100 ML/HR: 50; 4.5; 1.49 INJECTION, SOLUTION INTRAVENOUS at 01:57

## 2019-03-25 NOTE — PROGRESS NOTES
Subjective:  
 
Patient has complaints of bloating. Denies n/v. No significant stool output per ostomy. Reports experiencing a large volume mucoid rectal discharge when enema given into ostomy Objective:  
 
Visit Vitals /81 (BP 1 Location: Left arm, BP Patient Position: At rest;Supine) Pulse 67 Temp 96.8 °F (36 °C) Resp 16 Wt 137 lb (62.1 kg) SpO2 99% BMI 20.83 kg/m²  
; Temp (24hrs), Av.9 °F (36.1 °C), Min:96 °F (35.6 °C), Max:97.9 °F (36.6 °C) I/O reviewed in graphics Physical Exam:  
 General-in no distress. Lungs-CTA bilaterally without rales, rhonchi, or wheezes. Bases are not diminished. Respiratory effort isNormal 
 
Heart- Regular rate and rhythm Abdomen-distention not significantly changed  from previous exam.  Bowel sounds are active, somewhat obstructive in nature . There is no tenderness in the entire abdomen Labs: No results found for this or any previous visit (from the past 24 hour(s)). Data Review - Assessment:  
 
Principal Problem: 
  SBO (small bowel obstruction) ([facility] [address] (3/22/2019) Plan/Recommendations/Medical Decision Making:  
 
Continue present treatment Will obtain SBFT tomorrow- if significant obstruction, may be forced to consider exploration. Need to address nutrition soon.

## 2019-03-25 NOTE — PROGRESS NOTES
Nutrition: 
Visit secondary to patient phoning [facility] and requesting trays no longer be sent to him  RD visit to explain items available on clear liquids and rationale of diet. Pt reports + cramping with clear liquids last PM and no longer wanting to receive trays. He reports he plans to discuss with surgeon at visit today. Per pt request noon meal tray service will be held. [place] Glenda Garcia [place] [address]

## 2019-03-25 NOTE — PROGRESS NOTES
Shift assessment complete. Pt resting quietly in bed. No signs of acute distress. Resp even and unlabored. Alert and oriented x4. Pt has no complaints at this time. Call light within reach. Pt instructed to call for assistance.

## 2019-03-25 NOTE — PROGRESS NOTES
Care Management Interventions PCP Verified by CM: Yes Mode of Transport at Discharge: Other (see comment) Transition of Care Consult (CM Consult): Other Current Support Network: Own Home Confirm Follow Up Transport: Family Plan discussed with Pt/Family/Caregiver: Yes Freedom of Choice Offered: Yes Discharge Location Discharge Placement: Home Visited with pt regarding plans for discharge, pt lives home alone in his [de-identified], friends close by, sees Dr. Bhat CanÃ³vanas, but not since last summer. Per Surgery note as of 3/24. ... [facility] Pt admitted for more aggressive treatment- will keep start clear liquids and continue to hydrate intravenously Repeat Fleets per stoma CT by my review suggests subfascial fecal buildup proximal to ostomy- will also try stoma irrigation with enema in addition to NPO status. Surgical exploration will likely be very difficult given his complex surgical history. He is very aware of this. As such, will defer intervention as long as seems reasonable to give conservative measures a chance at success. He agrees. 
  
  
Khanh Godoy MD 
3/24/2019

## 2019-03-25 NOTE — PROGRESS NOTES
Shift assessment complete. Patient A&O x 4. Respirations present, even and unlabored. Breath sounds diminished. Patient on room air. Heart sounds regular. Abdomen soft, tender and distended. Patient reports passing a few small stool through colostomy and passing gas. Patient needs met. No distress noted. Bed low and locked. Call light within reach. Will continue to monitor hourly during shift.

## 2019-03-26 ENCOUNTER — APPOINTMENT (OUTPATIENT)
Dept: GENERAL RADIOLOGY | Age: 74
DRG: 390 | End: 2019-03-26
Attending: SURGERY
Payer: MEDICARE

## 2019-03-26 VITALS
DIASTOLIC BLOOD PRESSURE: 79 MMHG | BODY MASS INDEX: 20.83 KG/M2 | WEIGHT: 137 LBS | OXYGEN SATURATION: 95 % | HEART RATE: 65 BPM | TEMPERATURE: 96.6 F | SYSTOLIC BLOOD PRESSURE: 150 MMHG | RESPIRATION RATE: 16 BRPM

## 2019-03-26 LAB
MM INDURATION POC: 0 MM (ref 0–5)
PPD POC: NORMAL NEGATIVE

## 2019-03-26 PROCEDURE — 74011250637 HC RX REV CODE- 250/637: Performed by: NURSE PRACTITIONER

## 2019-03-26 PROCEDURE — 74011250637 HC RX REV CODE- 250/637: Performed by: SURGERY

## 2019-03-26 PROCEDURE — 77030033269 HC SLV COMPR SCD KNE2 CARD -B

## 2019-03-26 RX ADMIN — CLONAZEPAM 2 MG: 1 TABLET ORAL at 02:50

## 2019-03-26 RX ADMIN — LEVOTHYROXINE SODIUM 125 MCG: 125 TABLET ORAL at 06:01

## 2019-03-26 RX ADMIN — OLANZAPINE 5 MG: 2.5 TABLET, FILM COATED ORAL at 02:50

## 2019-03-26 NOTE — PROGRESS NOTES
Shift assessment complete. Pt resting quietly in bed. No signs of acute distress. Pt reports multiple large bowel movements overnight. Moderate amount of loose stool in ostomy at this time. Pt abdomen much less distended then yesterday. Pt denies pain. Call light within reach. Pt instructed to call for assistance.

## 2019-03-26 NOTE — PROGRESS NOTES
Physical therapy - jose received. Spoke with RN and patient. Patient states he has been walking the entire [place] of the hospital on his own and getting up as needed on his own in the room. He reports he is normally active and he feels normal from a mobility standpoint. He reports he doesn't need any physical therapy. Will discharge.

## 2019-03-26 NOTE — PROGRESS NOTES
Subjective:  
 
Patient has complaints of nothing. Multiple large-volume BMs overnight/this morning with resolution of discomfort and bloating. Tolerating water so far today Objective:  
 
Visit Vitals /79 (BP 1 Location: Left arm, BP Patient Position: At rest;Supine) Pulse 65 Temp 96.6 °F (35.9 °C) Resp 16 Wt 137 lb (62.1 kg) SpO2 95% BMI 20.83 kg/m²  
; Temp (24hrs), Av.2 °F (36.2 °C), Min:96.6 °F (35.9 °C), Max:98.1 °F (36.7 °C) I/O reviewed in graphics Physical Exam:  
 General-in no distress. Lungs-CTA bilaterally without rales, rhonchi, or wheezes. Respiratory effort isNormal 
 
Heart- Regular rate and rhythm Abdomen-distention decreased  from previous exam.  Bowel sounds are normal .There is no tenderness in the entire abdomen Labs:  
Recent Results (from the past 24 hour(s)) PLEASE READ & DOCUMENT PPD TEST IN 24 HRS Collection Time: 19 11:21 AM  
Result Value Ref Range PPD  Negative  
 mm Induration 0 0 - 5 mm Data Review - Assessment:  
 
Principal Problem: 
  [facility] (3/22/2019) Plan/Recommendations/Medical Decision Making: Will give more clear liquids this afternoon. Home later if tolerates clears Recommend he stay on clear/full liquids x 24-48hrs at home just to confirm resolution of obstruction. Questions answered.

## 2019-03-26 NOTE — PROGRESS NOTES
Problem: Falls - Risk of 
Goal: *Absence of Falls Description Document Adriano Rankin Fall Risk and appropriate interventions in the flowsheet. Outcome: Progressing Towards Goal 
Note:  
Fall Risk Interventions: 
  
 
  
 
Medication Interventions: Teach patient to arise slowly

## 2019-03-26 NOTE — PROGRESS NOTES
Shift assessment complete. A&OX4. Lungs clear on auscultation. Respirations present. Even and unlabored. Apical HR is regular. No s/s of distress. Zero c/o pain at this time. Call light within reach. Encouraged patient to call for assistance. Patient verbalizes understanding. See Doc Flowsheet for assessment details. Patient resting in bed. Bed alarm in progress. Door open for observation.

## 2019-03-26 NOTE — DISCHARGE INSTRUCTIONS
DISCHARGE SUMMARY from Nurse    PATIENT INSTRUCTIONS:    After general anesthesia or intravenous sedation, for 24 hours or while taking prescription Narcotics:  · Limit your activities  · Do not drive and operate hazardous machinery  · Do not make important personal or business decisions  · Do  not drink alcoholic beverages  · If you have not urinated within 8 hours after discharge, please contact your surgeon on call. Report the following to your surgeon:  · Excessive pain, swelling, redness or odor of or around the surgical area  · Temperature over 100.5  · Nausea and vomiting lasting longer than 4 hours or if unable to take medications  · Any signs of decreased circulation or nerve impairment to extremity: change in color, persistent  numbness, tingling, coldness or increase pain  · Any questions    What to do at Home:  Recommended activity: diet: clear/full liquids x 24-48 more hours to confirm resolution of obstruction, regular activity as tolerated     If you experience any of the following symptoms abdominal pain/nausea/vomiting, fever, chills, not having BM's passing gas, please follow up with Dr. Soniya Chery. *  Please give a list of your current medications to your Primary Care Provider. *  Please update this list whenever your medications are discontinued, doses are      changed, or new medications (including over-the-counter products) are added. *  Please carry medication information at all times in case of emergency situations. These are general instructions for a healthy lifestyle:    No smoking/ No tobacco products/ Avoid exposure to second hand smoke  Surgeon General's Warning:  Quitting smoking now greatly reduces serious risk to your health.     Obesity, smoking, and sedentary lifestyle greatly increases your risk for illness    A healthy diet, regular physical exercise & weight monitoring are important for maintaining a healthy lifestyle    You may be retaining fluid if you have a history of heart failure or if you experience any of the following symptoms:  Weight gain of 3 pounds or more overnight or 5 pounds in a week, increased swelling in our hands or feet or shortness of breath while lying flat in bed. Please call your doctor as soon as you notice any of these symptoms; do not wait until your next office visit. Recognize signs and symptoms of STROKE:    F-face looks uneven    A-arms unable to move or move unevenly    S-speech slurred or non-existent    T-time-call 911 as soon as signs and symptoms begin-DO NOT go       Back to bed or wait to see if you get better-TIME IS BRAIN. Warning Signs of HEART ATTACK     Call 911 if you have these symptoms:   Chest discomfort. Most heart attacks involve discomfort in the center of the chest that lasts more than a few minutes, or that goes away and comes back. It can feel like uncomfortable pressure, squeezing, fullness, or pain.  Discomfort in other areas of the upper body. Symptoms can include pain or discomfort in one or both arms, the back, neck, jaw, or stomach.  Shortness of breath with or without chest discomfort.  Other signs may include breaking out in a cold sweat, nausea, or lightheadedness. Don't wait more than five minutes to call 911 - MINUTES MATTER! Fast action can save your life. Calling 911 is almost always the fastest way to get lifesaving treatment. Emergency Medical Services staff can begin treatment when they arrive -- up to an hour sooner than if someone gets to the hospital by car. The discharge information has been reviewed with the patient. The patient verbalized understanding. Discharge medications reviewed with the patient and appropriate educational materials and side effects teaching were provided.   ___________________________________________________________________________________________________________________________________    Patient Education        Bowel Blockage (Intestinal Obstruction): Care Instructions  Your Care Instructions  A bowel blockage, also called an intestinal obstruction, can prevent gas, fluids, or solids from moving through the intestines normally. It can cause constipation and, rarely, diarrhea. You may have pain, nausea, vomiting, and cramping. Most of the time, complete blockages require a stay in the hospital and possibly surgery. But if your bowel is only partly blocked, your doctor may tell you to wait until it clears on its own and you are able to pass gas and stool. If so, there are things you can do at home to help make you feel better. If you have had surgery for a bowel blockage, there are things you can do at home to make sure you heal well. You can also make some changes to keep your bowel from becoming blocked again. Follow-up care is a key part of your treatment and safety. Be sure to make and go to all appointments, and call your doctor if you are having problems. It's also a good idea to know your test results and keep a list of the medicines you take. How can you care for yourself at home? If your doctor has told you to wait at home for a blockage to clear on its own:  · Follow your doctor's instructions. These may include eating a liquid diet to avoid complete blockage. · Be safe with medicines. Take your medicines exactly as prescribed. Call your doctor if you think you are having a problem with your medicine. · Put a heating pad set on low on your belly to relieve mild cramps and pain. To prevent another blockage  · Try to eat smaller amounts of food more often. For example, have 5 or 6 small meals throughout the day instead of 2 or 3 large meals. · Chew your food very well. Try to chew each bite about 20 times or until it is liquid. · Avoid high-fiber foods and raw fruits and vegetables with skins, husks, strings, or seeds. These can form a ball of undigested material that can cause a blockage if a part of your bowel is scarred or narrowed.   · Check with your doctor before you eat whole-grain products or use a fiber supplement such as Citrucel or Metamucil. · To help you have regular bowel movements, eat at regular times, do not strain during a bowel movement, and drink at least 8 to 10 glasses of water each day. If you have kidney, heart, or liver disease and have to limit fluids, talk with your doctor or before you increase the amount of fluids you drink. · Drink high-calorie liquid formulas if your doctor says to. Severe symptoms may make it hard for your body to take in vitamins and minerals. · Get regular exercise. It helps you digest your food better. Get at least 30 minutes of physical activity on most days of the week. Walking is a good choice. When should you call for help? Call your doctor now or seek immediate medical care if:    · You have a fever.     · You are vomiting.     · You have new or worse belly pain.     · You cannot pass stools or gas.    Watch closely for changes in your health, and be sure to contact your doctor if you have any problems. Where can you learn more? Go to http://randall-cee.info/. Enter B320 in the search box to learn more about \"Bowel Blockage (Intestinal Obstruction): Care Instructions. \"  Current as of: March 27, 2018  Content Version: 11.9  © 0607-0012 [employer]. Care instructions adapted under license by [employer] (which disclaims liability or warranty for this information). If you have questions about a medical condition or this instruction, always ask your healthcare professional. [place] [address] any warranty or liability for your use of this information.

## 2019-03-26 NOTE — PROGRESS NOTES
Discharge instructions were reviewed with the patient. Opportunity for questions given. Patient verbalized understanding of discharge and follow up instructions, as well as S/S to report to MD or return to ER for. PIV was removed. Pt will dc home.

## 2019-03-26 NOTE — PROGRESS NOTES
Initial visit by  to convey care and concern and encourage patient that  services are available if desired. No needs were voiced during the visit. Provided 's business card for future reference. Chaplains remain available for support. Marcy Kowalski MDiv Board Certified [employer]

## 2019-03-27 ENCOUNTER — PATIENT OUTREACH (OUTPATIENT)
Dept: CASE MANAGEMENT | Age: 74
End: 2019-03-27

## 2019-03-27 NOTE — PROGRESS NOTES
This note will not be viewable in [address] 1st Attempt to contact patient for [facility] call, no answer, left  for returned call. Will attempt to contact patient again within 24 hours

## 2019-03-28 ENCOUNTER — PATIENT OUTREACH (OUTPATIENT)
Dept: CASE MANAGEMENT | Age: 74
End: 2019-03-28

## 2019-03-28 NOTE — PROGRESS NOTES
This note will not be viewable in [address] 2nd attempt to contact patient for [facility] call, no answer, left  for returned call. Will attempt 3rd call within 5 business days.

## 2019-03-29 ENCOUNTER — PATIENT OUTREACH (OUTPATIENT)
Dept: CASE MANAGEMENT | Age: 74
End: 2019-03-29

## 2019-03-29 NOTE — PROGRESS NOTES
This note will not be viewable in [address] Date/Time of Call: 03/29/19 153pm  
What was the patient hospitalized for? SBO Consent for ASHLYN VÁZQUEZ Call Does the patient understand his/her diagnosis and/or treatment and what happened during the hospitalization? Patient agrees to call States that he is doing okay, still having some cramps and bloating, but not as bad as it was and ostomy is not blocked Did the patient receive discharge instructions? Yes  
CM Assessed Risk for Readmission:  
 
 
Patient stated Risk for Readmission:  Patient is a moderate to high risk for readmission No concern voiced at this time Review any discharge instructions [facility] Ask patient if they understand these. Do they have any questions? Reviewed Were home services ordered (nursing, PT, OT, ST, etc.)? No  
If so, has the first visit occurred? If not, why? (Assist with coordination of services if necessary. ) 
 NA Was any DME ordered? No  
If so, has it been received? If not, why?  (Assist patient in obtaining DME orders &/or equipment if necessary. ) NA Complete a review of all medications (new, continued and discontinued meds per the D/C instructions and medication tab in [facility]). Medications reviewed Were all new prescriptions filled? If not, why?  (Assist patient in obtaining medications if necessary  escalate for CCM &/or SW if ongoing issues are verbalized by pt or anticipated) NA Does the patient understand the purpose and dosing instructions for all medications? (If patient has questions, provide explanation and education.) Patient verbalizes understanding of medications Does the patient have any problems in performing ADLs? (If patient is unable to perform ADLs  what is the limiting factor(s)?   Do they have a support system that can assist? If no support system is present, discuss possible assistance that they may be able to obtain. Escalate for CCM/SW if ongoing issues are verbalized by pt or anticipated) Patient independent with ADLs Does the patient have all follow-up appointments scheduled? 7 day f/up with PCP?  
(f/up with PCP may be w/in 14 days if patient has a f/up with their specialist w/in 7 days) 7-14 day f/up with specialist?  
(or per discharge instructions) If f/up has not been made  what actions has the care coordinator made to accomplish this? Has transportation been arranged? Yes Patient states that PCP is Internal Medicine and is not going to do anything for his problems, no need to schedule appointment Oncology 04/09/19 at 1130am 
 
 
Reviewed appointment information and importance of FUs with patient No concerns at this time Any other questions or concerns expressed by the patient? No questions or concerns voiced at this time. Care Coordinator contact information provided should any needs arise. Schedule next appointment with ASHLYN Yoder or refer to RN Case Manager/ per the workflow guidelines. When is care coordinators next follow-up call scheduled? If referred for CCM  what RN care manager was the referral assigned? Within 30 days Within 30 days NA  
[facility] Call Completed By: Antonio Garner CMA Care Coordinator

## 2019-03-30 ENCOUNTER — HOSPITAL ENCOUNTER (INPATIENT)
Age: 74
LOS: 34 days | Discharge: NURSING, REHAB OR LONG-TERM CARE | DRG: 330 | End: 2019-05-03
Attending: EMERGENCY MEDICINE | Admitting: SURGERY
Payer: MEDICARE

## 2019-03-30 ENCOUNTER — APPOINTMENT (OUTPATIENT)
Dept: GENERAL RADIOLOGY | Age: 74
DRG: 330 | End: 2019-03-30
Attending: EMERGENCY MEDICINE
Payer: MEDICARE

## 2019-03-30 DIAGNOSIS — K56.609 SBO (SMALL BOWEL OBSTRUCTION) (HCC): Primary | ICD-10-CM

## 2019-03-30 LAB
ALBUMIN SERPL-MCNC: 3.8 G/DL (ref 3.2–4.6)
ALBUMIN/GLOB SERPL: 1 {RATIO}
ALP SERPL-CCNC: 97 U/L (ref 50–136)
ALT SERPL-CCNC: 40 U/L (ref 12–65)
ANION GAP SERPL CALC-SCNC: 9 MMOL/L
AST SERPL-CCNC: 13 U/L (ref 15–37)
BASOPHILS # BLD: 0 K/UL (ref 0–0.2)
BASOPHILS NFR BLD: 0 % (ref 0–2)
BILIRUB SERPL-MCNC: 0.5 MG/DL (ref 0.2–1.1)
BUN SERPL-MCNC: 35 MG/DL (ref 8–23)
CALCIUM SERPL-MCNC: 9.1 MG/DL (ref 8.3–10.4)
CHLORIDE SERPL-SCNC: 101 MMOL/L (ref 98–107)
CO2 SERPL-SCNC: 28 MMOL/L (ref 21–32)
CREAT SERPL-MCNC: 1.26 MG/DL (ref 0.8–1.5)
DIFFERENTIAL METHOD BLD: ABNORMAL
EOSINOPHIL # BLD: 0 K/UL (ref 0–0.8)
EOSINOPHIL NFR BLD: 0 % (ref 0.5–7.8)
ERYTHROCYTE [DISTWIDTH] IN BLOOD BY AUTOMATED COUNT: 12.6 % (ref 11.9–14.6)
GLOBULIN SER CALC-MCNC: 3.7 G/DL (ref 2.3–3.5)
GLUCOSE SERPL-MCNC: 108 MG/DL (ref 65–100)
HCT VFR BLD AUTO: 41.4 % (ref 41.1–50.3)
HGB BLD-MCNC: 13.2 G/DL (ref 13.6–17.2)
IMM GRANULOCYTES # BLD AUTO: 0 K/UL (ref 0–0.5)
IMM GRANULOCYTES NFR BLD AUTO: 0 % (ref 0–5)
LIPASE SERPL-CCNC: 300 U/L (ref 73–393)
LYMPHOCYTES # BLD: 0.6 K/UL (ref 0.5–4.6)
LYMPHOCYTES NFR BLD: 8 % (ref 13–44)
MCH RBC QN AUTO: 28.9 PG (ref 26.1–32.9)
MCHC RBC AUTO-ENTMCNC: 31.9 G/DL (ref 31.4–35)
MCV RBC AUTO: 90.6 FL (ref 79.6–97.8)
MONOCYTES # BLD: 1 K/UL (ref 0.1–1.3)
MONOCYTES NFR BLD: 13 % (ref 4–12)
NEUTS SEG # BLD: 6.5 K/UL (ref 1.7–8.2)
NEUTS SEG NFR BLD: 80 % (ref 43–78)
NRBC # BLD: 0 K/UL (ref 0–0.2)
PLATELET # BLD AUTO: 240 K/UL (ref 150–450)
PMV BLD AUTO: 9.6 FL (ref 9.4–12.3)
POTASSIUM SERPL-SCNC: 4 MMOL/L (ref 3.5–5.1)
PROT SERPL-MCNC: 7.5 G/DL
RBC # BLD AUTO: 4.57 M/UL (ref 4.23–5.6)
SODIUM SERPL-SCNC: 138 MMOL/L (ref 136–145)
WBC # BLD AUTO: 8.2 K/UL (ref 4.3–11.1)

## 2019-03-30 PROCEDURE — 74011250636 HC RX REV CODE- 250/636: Performed by: EMERGENCY MEDICINE

## 2019-03-30 PROCEDURE — 74022 RADEX COMPL AQT ABD SERIES: CPT

## 2019-03-30 PROCEDURE — 74011250637 HC RX REV CODE- 250/637: Performed by: SURGERY

## 2019-03-30 PROCEDURE — 80053 COMPREHEN METABOLIC PANEL: CPT

## 2019-03-30 PROCEDURE — 74011250636 HC RX REV CODE- 250/636: Performed by: SURGERY

## 2019-03-30 PROCEDURE — 99284 EMERGENCY DEPT VISIT MOD MDM: CPT | Performed by: EMERGENCY MEDICINE

## 2019-03-30 PROCEDURE — 85025 COMPLETE CBC W/AUTO DIFF WBC: CPT

## 2019-03-30 PROCEDURE — 65270000029 HC RM PRIVATE

## 2019-03-30 PROCEDURE — 83690 ASSAY OF LIPASE: CPT

## 2019-03-30 RX ORDER — CLONAZEPAM 1 MG/1
4 TABLET ORAL ONCE
Status: DISCONTINUED | OUTPATIENT
Start: 2019-03-30 | End: 2019-03-30

## 2019-03-30 RX ORDER — MELATONIN
2000 2 TIMES DAILY
Status: DISCONTINUED | OUTPATIENT
Start: 2019-03-31 | End: 2019-05-03 | Stop reason: HOSPADM

## 2019-03-30 RX ORDER — MORPHINE SULFATE 2 MG/ML
2 INJECTION, SOLUTION INTRAMUSCULAR; INTRAVENOUS
Status: DISCONTINUED | OUTPATIENT
Start: 2019-03-30 | End: 2019-04-04

## 2019-03-30 RX ORDER — CLONAZEPAM 1 MG/1
4 TABLET ORAL
Status: DISCONTINUED | OUTPATIENT
Start: 2019-03-30 | End: 2019-05-03 | Stop reason: HOSPADM

## 2019-03-30 RX ORDER — ACETAMINOPHEN 325 MG/1
650 TABLET ORAL
Status: DISCONTINUED | OUTPATIENT
Start: 2019-03-30 | End: 2019-04-04

## 2019-03-30 RX ORDER — DIPHENHYDRAMINE HYDROCHLORIDE 50 MG/ML
12.5 INJECTION, SOLUTION INTRAMUSCULAR; INTRAVENOUS
Status: DISCONTINUED | OUTPATIENT
Start: 2019-03-30 | End: 2019-05-03 | Stop reason: HOSPADM

## 2019-03-30 RX ORDER — NALOXONE HYDROCHLORIDE 0.4 MG/ML
0.4 INJECTION, SOLUTION INTRAMUSCULAR; INTRAVENOUS; SUBCUTANEOUS AS NEEDED
Status: DISCONTINUED | OUTPATIENT
Start: 2019-03-30 | End: 2019-05-03 | Stop reason: HOSPADM

## 2019-03-30 RX ORDER — AMITRIPTYLINE HYDROCHLORIDE 50 MG/1
100 TABLET, FILM COATED ORAL
Status: DISCONTINUED | OUTPATIENT
Start: 2019-03-30 | End: 2019-05-03 | Stop reason: HOSPADM

## 2019-03-30 RX ORDER — CLONAZEPAM 1 MG/1
2 TABLET ORAL
Status: DISCONTINUED | OUTPATIENT
Start: 2019-03-30 | End: 2019-03-30

## 2019-03-30 RX ORDER — CLONAZEPAM 1 MG/1
2 TABLET ORAL
Status: DISCONTINUED | OUTPATIENT
Start: 2019-03-31 | End: 2019-04-12 | Stop reason: ALTCHOICE

## 2019-03-30 RX ORDER — LEVOTHYROXINE SODIUM 125 UG/1
125 TABLET ORAL
Status: DISCONTINUED | OUTPATIENT
Start: 2019-03-31 | End: 2019-05-03 | Stop reason: HOSPADM

## 2019-03-30 RX ORDER — CLONAZEPAM 1 MG/1
4 TABLET ORAL
Status: DISCONTINUED | OUTPATIENT
Start: 2019-03-30 | End: 2019-03-30 | Stop reason: SDUPTHER

## 2019-03-30 RX ORDER — OLANZAPINE 2.5 MG/1
5 TABLET ORAL
Status: DISCONTINUED | OUTPATIENT
Start: 2019-03-31 | End: 2019-05-03 | Stop reason: HOSPADM

## 2019-03-30 RX ORDER — OLANZAPINE 2.5 MG/1
2.5 TABLET ORAL
Status: DISCONTINUED | OUTPATIENT
Start: 2019-03-30 | End: 2019-05-03 | Stop reason: HOSPADM

## 2019-03-30 RX ORDER — ONDANSETRON 2 MG/ML
4 INJECTION INTRAMUSCULAR; INTRAVENOUS
Status: DISCONTINUED | OUTPATIENT
Start: 2019-03-30 | End: 2019-05-03 | Stop reason: HOSPADM

## 2019-03-30 RX ORDER — DEXTROSE, SODIUM CHLORIDE, AND POTASSIUM CHLORIDE 5; .45; .15 G/100ML; G/100ML; G/100ML
150 INJECTION INTRAVENOUS CONTINUOUS
Status: DISCONTINUED | OUTPATIENT
Start: 2019-03-30 | End: 2019-04-08

## 2019-03-30 RX ORDER — CLONAZEPAM 1 MG/1
2 TABLET ORAL ONCE
Status: DISCONTINUED | OUTPATIENT
Start: 2019-03-31 | End: 2019-03-30

## 2019-03-30 RX ORDER — OXYCODONE AND ACETAMINOPHEN 5; 325 MG/1; MG/1
1 TABLET ORAL
Status: DISCONTINUED | OUTPATIENT
Start: 2019-03-30 | End: 2019-04-04

## 2019-03-30 RX ADMIN — Medication 1 AMPULE: at 22:56

## 2019-03-30 RX ADMIN — DEXTROSE MONOHYDRATE, SODIUM CHLORIDE, AND POTASSIUM CHLORIDE 100 ML/HR: 50; 4.5; 1.49 INJECTION, SOLUTION INTRAVENOUS at 17:52

## 2019-03-30 RX ADMIN — OLANZAPINE 2.5 MG: 2.5 TABLET, FILM COATED ORAL at 22:53

## 2019-03-30 RX ADMIN — SODIUM CHLORIDE 1000 ML: 900 INJECTION, SOLUTION INTRAVENOUS at 12:26

## 2019-03-30 RX ADMIN — CLONAZEPAM 4 MG: 1 TABLET ORAL at 22:53

## 2019-03-30 RX ADMIN — AMITRIPTYLINE HYDROCHLORIDE 100 MG: 50 TABLET, FILM COATED ORAL at 22:53

## 2019-03-30 NOTE — ED PROVIDER NOTES
Patient with history of rectal cancer and colostomy present. Was admitted March 22, for small bowel obstruction and no output from the colostomy bag. Was treated conservatively with IV fluids and decreased by mouth intake. On March 26, started having good stool output and feeling better. Was discharged. Yesterday, eating solid foods at home 1st time since discharge, has had recurrence of decreased stool output and abdominal pain. With symptoms continued today and no stool output came in. The history is provided by the patient. No  was used. Abdominal Pain This is a new problem. The current episode started yesterday. The problem occurs constantly. The problem has been gradually worsening. The pain is associated with an unknown factor. The pain is located in the generalized abdominal region. The pain is moderate. Associated symptoms include nausea and constipation. Pertinent negatives include no fever, no diarrhea, no melena, no vomiting, no dysuria, no hematuria, no headaches, no chest pain and no back pain. Nothing worsens the pain. The pain is relieved by nothing. The patient's surgical history includes colectomy. Past Medical History:  
Diagnosis Date  Family history of malignant neoplasm of gastrointestinal tract   
 mother colon/ovarian cancer 67  Fecal incontinence LAR syndrome  Former cigarette smoker  History of rectal cancer  Hypothyroid   
 stable w/med  Infection and inflammatory reaction due to other internal prosthetic devices, implants and grafts, subsequent encounter 2017  
 abd wound/mesh colostomy  Insomnia   
 takes meds  Osteoarthritis, hand  Personal history of colonic polyps 9/2013  
 x 1  
 Personal history of malignant neoplasm of rectum, rectosigmoid junction, and anus 9/2013  Psychiatric disorder   
 anxiety Past Surgical History:  
Procedure Laterality Date  COLONOSCOPY N/A 2/12/2018 COLONOSCOPY / BMI=21 performed by Gonzalo Olivares MD at [facility] ENDOSCOPY  COLONOSCOPY THRU STOMA  2018  ENDOSCOPY, COLON, DIAGNOSTIC  last 2/3/15 Emily--no polyps--3 year recall  HX COLECTOMY  2013 Emily--lap LAR with coloproctostomy, mobilization splenic flexure, diverting loop ileostomy  HX COLECTOMY Right 2014  
 for leak  HX COLOSTOMY  16  HX GI  2016 Sacral nerve stimlator lead trial (unsucessful) and removal  
 HX HERNIA REPAIR  3/2015, 2016  HX HERNIA REPAIR    
 and Colostomy in May  HX OTHER SURGICAL  10/7/2013 Emily---endorectal us  HX OTHER SURGICAL Bilateral   
 cataracts    HX POLYPECTOMY  HX VASCULAR ACCESS Left   
 single lumen port/subsequently removed Family History:  
Problem Relation Age of Onset  Cancer Mother   
     colon and ovarian  Cancer Brother   
     prostate  Alcohol abuse Brother  Cancer Maternal Grandmother   
     bone  Alcohol abuse Father  Alcohol abuse Sister  Stroke Paternal Grandfather Social History Socioeconomic History  Marital status: SINGLE Spouse name: Not on file  Number of children: Not on file  Years of education: Not on file  Highest education level: Not on file Occupational History  Not on file Social Needs  Financial resource strain: Not on file  Food insecurity:  
  Worry: Not on file Inability: Not on file  Transportation needs:  
  Medical: Not on file Non-medical: Not on file Tobacco Use  Smoking status: Former Smoker Last attempt to quit: 1963 Years since quittin.4  Smokeless tobacco: Never Used Substance and Sexual Activity  Alcohol use: Not Currently Alcohol/week: 7.0 oz Types: 14 Cans of beer per week Comment: advised stop drinking given issues  Drug use: No  
 Sexual activity: Never Lifestyle  Physical activity: Days per week: Not on file Minutes per session: Not on file  Stress: Not on file Relationships  Social connections:  
  Talks on phone: Not on file Gets together: Not on file Attends Nondenominational service: Not on file Active member of club or organization: Not on file Attends meetings of clubs or organizations: Not on file Relationship status: Not on file  Intimate partner violence:  
  Fear of current or ex partner: Not on file Emotionally abused: Not on file Physically abused: Not on file Forced sexual activity: Not on file Other Topics Concern  Not on file Social History Narrative  Not on file ALLERGIES: Patient has no known allergies. Review of Systems Constitutional: Negative for chills and fever. HENT: Negative for rhinorrhea and sore throat. Eyes: Negative for pain and redness. Respiratory: Negative for chest tightness, shortness of breath and wheezing. Cardiovascular: Negative for chest pain and leg swelling. Gastrointestinal: Positive for abdominal pain, constipation and nausea. Negative for diarrhea, melena and vomiting. Genitourinary: Negative for dysuria and hematuria. Musculoskeletal: Negative for back pain, gait problem, neck pain and neck stiffness. Skin: Negative for color change and rash. Neurological: Negative for weakness, numbness and headaches. Vitals:  
 03/30/19 1202 BP: 103/67 Pulse: (!) 114 Resp: 16 Temp: 98.5 °F (36.9 °C) SpO2: 96% Weight: 63.5 kg (140 lb) Height: 5' 8\" (1.727 m) Physical Exam  
Constitutional: He is oriented to person, place, and time. He appears well-developed and well-nourished. HENT:  
Head: Normocephalic and atraumatic. Neck: Normal range of motion. Neck supple. Cardiovascular: Normal rate and regular rhythm. Pulmonary/Chest: Effort normal and breath sounds normal.  
Abdominal: Soft. Bowel sounds are normal. He exhibits distension.  There is tenderness (diffuse). There is no rebound and no guarding. Left lower abd wall colostomy present. Musculoskeletal: Normal range of motion. He exhibits no edema. Neurological: He is alert and oriented to person, place, and time. Skin: Skin is warm and dry. MDM Number of Diagnoses or Management Options Diagnosis management comments: Patient with small bowel obstruction. Will admit to the hospital. 
 
  
Amount and/or Complexity of Data Reviewed Clinical lab tests: ordered and reviewed Tests in the radiology section of CPT®: ordered and reviewed Tests in the medicine section of CPT®: ordered and reviewed Patient Progress Patient progress: stable Procedures Results Include: 
 
Recent Results (from the past 24 hour(s)) CBC WITH AUTOMATED DIFF Collection Time: 03/30/19 12:27 PM  
Result Value Ref Range WBC 8.2 4.3 - 11.1 K/uL  
 RBC 4.57 4.23 - 5.6 M/uL  
 HGB 13.2 (L) 13.6 - 17.2 g/dL HCT 41.4 41.1 - 50.3 % MCV 90.6 79.6 - 97.8 FL  
 MCH 28.9 26.1 - 32.9 PG  
 MCHC 31.9 31.4 - 35.0 g/dL  
 RDW 12.6 11.9 - 14.6 % PLATELET 477 352 - 325 K/uL MPV 9.6 9.4 - 12.3 FL ABSOLUTE NRBC 0.00 0.0 - 0.2 K/uL  
 DF AUTOMATED NEUTROPHILS 80 (H) 43 - 78 % LYMPHOCYTES 8 (L) 13 - 44 % MONOCYTES 13 (H) 4.0 - 12.0 % EOSINOPHILS 0 (L) 0.5 - 7.8 % BASOPHILS 0 0.0 - 2.0 % IMMATURE GRANULOCYTES 0 0.0 - 5.0 %  
 ABS. NEUTROPHILS 6.5 1.7 - 8.2 K/UL  
 ABS. LYMPHOCYTES 0.6 0.5 - 4.6 K/UL  
 ABS. MONOCYTES 1.0 0.1 - 1.3 K/UL  
 ABS. EOSINOPHILS 0.0 0.0 - 0.8 K/UL  
 ABS. BASOPHILS 0.0 0.0 - 0.2 K/UL  
 ABS. IMM. GRANS. 0.0 0.0 - 0.5 K/UL METABOLIC PANEL, COMPREHENSIVE Collection Time: 03/30/19 12:27 PM  
Result Value Ref Range Sodium 138 136 - 145 mmol/L Potassium 4.0 3.5 - 5.1 mmol/L Chloride 101 98 - 107 mmol/L  
 CO2 28 21 - 32 mmol/L Anion gap 9 mmol/L Glucose 108 (H) 65 - 100 mg/dL  BUN 35 (H) 8 - 23 MG/DL  
 Creatinine 1.26 0.8 - 1.5 MG/DL  
 GFR est AA >60 >60 ml/min/1.73m2 GFR est non-AA 59 ml/min/1.73m2 Calcium 9.1 8.3 - 10.4 MG/DL Bilirubin, total 0.5 0.2 - 1.1 MG/DL  
 ALT (SGPT) 40 12 - 65 U/L  
 AST (SGOT) 13 (L) 15 - 37 U/L Alk. phosphatase 97 50 - 136 U/L Protein, total 7.5 g/dL Albumin 3.8 3.2 - 4.6 g/dL Globulin 3.7 (H) 2.3 - 3.5 g/dL A-G Ratio 1.0 LIPASE Collection Time: 03/30/19 12:27 PM  
Result Value Ref Range Lipase 300 73 - 393 U/L  
 
 
 
 
 
 
XR ABD ACUTE W 1 V CHEST (Final result) Result time 03/30/19 12:48:52 Final result by Lynn DO Mateo (03/30/19 12:48:52) Impression:  
 IMPRESSION: 
1. Scattered air-fluid levels in mildly dilated small bowel loops. The sinus are 
nonspecific but could represent a small bowel obstruction versus ileus. 2. Mild left basilar subsegmental atelectasis or scar. Narrative:  
 Acute abdominal series:  
 
HISTORY: abd pain An acute abdominal series was performed. COMPARISON: 03/30/2015. Correlation is made to the CT scan of the abdomen and 
pelvis 03/20/2019. FINDINGS: There are several air-fluid levels and few mildly dilated small bowel 
loops. A left lower quadrant ostomy is present. There is stable mild elevation 
of the left hemidiaphragm. There is no free intraperitoneal air. No radiopaque 
densities resembling calculi are identified. A single view the chest was obtained. Comparison was made to the prior exam 
09/17/2014. The cardiac and mediastinal silhouettes are normal in size and 
configuration. There is mild left basilar subsegmental atelectasis or scar. There are no pleural effusions. Nodular opacity overlying the right lower lung 
zone is felt to represent a nipple shadow. The pulmonary vasculature is within 
normal limits.

## 2019-03-30 NOTE — ED TRIAGE NOTES
Patient advises that he has colostomy in place secondary to colon cancer in 2014 and was discharge from here on 3/26/2019 and his stoma was not allowing any passage of stool. Patient advises that they were going to prepare for surgery but stoma opened and was discharge, patient advises that it is not not allowing passage at this time. Denies any pain or cramps at this time.

## 2019-03-30 NOTE — ED NOTES
Dr. Letty Mas has evaluated pt at this time and states he will put in admission orders. Pt waiting for room assignment.

## 2019-03-30 NOTE — H&P
[address] [facility] 3/30/2019 Date of Admission:  3/30/2019 Subjective: This is a 56-year-old male previous patient of Dr. Nicko Cueva with history of rectal cancer ileostomy and colostomy. All of the surgeries were performed in 2014. Patient has done well until March 22 1 week ago when he began having abdominal bloating and pain and decreased colostomy output. He was a admitted to the hospital by Maureen Noriega with a small bowel obstruction for 4 days. The patient continued with minimal colostomy output until March 26 when he had 3 large bowel movements and was discharged home. He states that at home he was doing well until yesterday when he ate to solid food meals and once again the abdominal pain reoccurred with decreased ostomy output. His meals can consisted of apples almonds and [place] food with steamed vegetables. He denies any associated nausea or vomiting. He denies fevers. He is here for small bowel obstruction and will be admitted for observation. Patient Active Problem List  
 Diagnosis Date Noted  Small bowel obstruction ([address] 03/30/2019  
 SBO (small bowel obstruction) ([address] 03/22/2019  Infection of implant ([address] 03/30/2017  Abdominal wall abscess 05/22/2016  Infected prosthetic mesh of abdominal wall ([address] 05/22/2016  Abscess 05/22/2016  Fecal incontinence 05/11/2016  Bowel incontinence 05/11/2016  Weight loss 11/19/2015 Class: Acute  Hypercalcemia of malignancy 11/19/2015  Hypokalemia 08/29/2014  Hypomagnesemia 08/29/2014  Pleural effusion 08/29/2014  Hypernatremia 08/29/2014  Severe sepsis with acute organ dysfunction ([address] 08/24/2014  Hypoxemia 08/24/2014  Peritonitis (acute) generalized 08/24/2014  Attention to ileostomy ([address] 08/20/2014  Dehydration 04/17/2014  Ileus ([address] 03/20/2014  Postoperative ileus ([address] 02/07/2014  Constipation 10/29/2013  Rectal cancer ([address] 10/22/2013 Past Medical History: Diagnosis Date  Family history of malignant neoplasm of gastrointestinal tract   
 mother colon/ovarian cancer 67  Fecal incontinence LAR syndrome  Former cigarette smoker  History of rectal cancer  Hypothyroid   
 stable w/med  Infection and inflammatory reaction due to other internal prosthetic devices, implants and grafts, subsequent encounter 2017  
 abd wound/mesh colostomy  Insomnia   
 takes meds  Osteoarthritis, hand  Personal history of colonic polyps 9/2013  
 x 1  
 Personal history of malignant neoplasm of rectum, rectosigmoid junction, and anus 9/2013  Psychiatric disorder   
 anxiety Past Surgical History:  
Procedure Laterality Date  COLONOSCOPY N/A 2/12/2018 COLONOSCOPY / BMI=21 performed by Batsheva Farrell MD at [facility] ENDOSCOPY  COLONOSCOPY THRU STOMA  2/12/2018  ENDOSCOPY, COLON, DIAGNOSTIC  last 2/3/15 Emily--no polyps--3 year recall  HX COLECTOMY  11/2013 Emily--lap LAR with coloproctostomy, mobilization splenic flexure, diverting loop ileostomy  HX COLECTOMY Right 8/2014  
 for leak  HX COLOSTOMY  5/11/16  HX GI  4/2016 Sacral nerve stimlator lead trial (unsucessful) and removal  
 HX HERNIA REPAIR  3/2015, 5/2016  HX HERNIA REPAIR    
 and Colostomy in May  HX OTHER SURGICAL  10/7/2013 Emily---endorectal us  HX OTHER SURGICAL Bilateral   
 cataracts  2017  HX POLYPECTOMY  HX VASCULAR ACCESS Left 4/14  
 single lumen port/subsequently removed Prior to Admission Medications Prescriptions Last Dose Informant Patient Reported? Taking? Cholecalciferol, Vitamin D3, (VITAMIN D3) 2,000 unit cap capsule   No No  
Sig: Take 2,000 Units by mouth two (2) times a day. OLANZapine (ZYPREXA) 2.5 mg tablet   Yes No  
Sig: Take 2.5 mg by mouth nightly.  Indications: TAKES 3 PILLS qHS  
amitriptyline (ELAVIL) 100 mg tablet   Yes No  
 Sig: Take 100 mg by mouth nightly. Dr Yelena Roman psych  Indications: takes for sleep  
clonazePAM (KLONOPIN) 2 mg tablet   No No  
Sig: Take 2 Tabs by mouth nightly. Max Daily Amount: 4 mg. Indications: takes for sleep Patient taking differently: Take 2 mg by mouth nightly. Dr Yelena Roman psych  Indications: takes for sleep-TAKES 3PILLS  
levothyroxine (SYNTHROID) 125 mcg tablet   No No  
Sig: TAKE ONE TABLET BY MOUTH ONE TIME DAILY BEFORE BREAKFAST Facility-Administered Medications: None No Known Allergies Social History Tobacco Use  Smoking status: Former Smoker Last attempt to quit: 1963 Years since quittin.4  Smokeless tobacco: Never Used Substance Use Topics  Alcohol use: Not Currently Alcohol/week: 7.0 oz Types: 14 Cans of beer per week Comment: advised stop drinking given issues Social History Social History Narrative  Not on file Family History Problem Relation Age of Onset  Cancer Mother   
     colon and ovarian  Cancer Brother   
     prostate  Alcohol abuse Brother  Cancer Maternal Grandmother   
     bone  Alcohol abuse Father  Alcohol abuse Sister  Stroke Paternal Grandfather Current Facility-Administered Medications Medication Dose Route Frequency  naloxone (NARCAN) injection 0.4 mg  0.4 mg IntraVENous PRN  
 diphenhydrAMINE (BENADRYL) injection 12.5 mg  12.5 mg IntraVENous Q4H PRN  
 ondansetron (ZOFRAN) injection 4 mg  4 mg IntraVENous Q4H PRN  
 dextrose 5% - 0.45% NaCl with KCl 20 mEq/L infusion  100 mL/hr IntraVENous CONTINUOUS  
 acetaminophen (TYLENOL) tablet 650 mg  650 mg Oral Q4H PRN  
 oxyCODONE-acetaminophen (PERCOCET) 5-325 mg per tablet 1 Tab  1 Tab Oral Q4H PRN  
 morphine injection 2 mg  2 mg IntraVENous Q4H PRN  
 amitriptyline (ELAVIL) tablet 100 mg  100 mg Oral QHS  . PHARMACY TO SUBSTITUTE PER PROTOCOL (Reordered from: Cholecalciferol, Vitamin D3, (VITAMIN D3) 2,000 unit cap capsule)    Per Protocol  clonazePAM (KlonoPIN) tablet 4 mg  4 mg Oral QHS  [START ON 3/31/2019] levothyroxine (SYNTHROID) tablet 125 mcg  125 mcg Oral 6am  
 
Current Outpatient Medications Medication Sig  OLANZapine (ZYPREXA) 2.5 mg tablet Take 2.5 mg by mouth nightly. Indications: TAKES 3 PILLS qHS  levothyroxine (SYNTHROID) 125 mcg tablet TAKE ONE TABLET BY MOUTH ONE TIME DAILY BEFORE BREAKFAST  Cholecalciferol, Vitamin D3, (VITAMIN D3) 2,000 unit cap capsule Take 2,000 Units by mouth two (2) times a day.  clonazePAM (KLONOPIN) 2 mg tablet Take 2 Tabs by mouth nightly. Max Daily Amount: 4 mg. Indications: takes for sleep (Patient taking differently: Take 2 mg by mouth nightly. Dr Shani Conde psych  Indications: takes for sleep-TAKES 3PILLS)  amitriptyline (ELAVIL) 100 mg tablet Take 100 mg by mouth nightly. Dr Shani Conde psych  Indications: takes for sleep Review of Systems A comprehensive review of systems was negative except for that written in the HPI. Objective:  
 
Vitals:  
 03/30/19 1231 03/30/19 1440 03/30/19 1540 03/30/19 1640 BP:  143/79 145/83 142/84 Pulse:      
Resp:      
Temp:      
SpO2: 94% 97% 96% 98% Weight:      
Height: PHYSICAL EXAM  
Physical Examination: General appearance - alert, well appearing, and in no distress Mental status - alert, oriented to person, place, and time Eyes - pupils equal and reactive, extraocular eye movements intact Nose - normal and patent, no erythema, discharge or polyps Neck - supple, no significant adenopathy Chest - clear to auscultation, no wheezes, rales or rhonchi, symmetric air entry Heart - normal rate, regular rhythm, normal S1, S2, no murmurs, rubs, clicks or gallops Abdomen - soft, nontender, nondistended, no masses or organomegaly he has good bowel sounds with minimal guarding and no peritonitis.  
Neurological - alert, oriented, normal speech, no focal findings or movement disorder noted Musculoskeletal - no joint tenderness, deformity or swelling Extremities - peripheral pulses normal, no pedal edema, no clubbing or cyanosis Skin - normal coloration and turgor, no rashes, no suspicious skin lesions noted Recent Labs  
  03/30/19 
1227 WBC 8.2 HGB 13.2* HCT 41.4  Recent Labs  
  03/30/19 
1227   
K 4.0  
 * CO2 28 BUN 35* CREA 1.26 No results for input(s): PH, PCO2, PO2, HCO3 in the last 72 hours. Assessment:  
 
Hospital Problems  Date Reviewed: 12/17/2018 Codes Class Noted POA * (Principal) Small bowel obstruction ([facility] ICD-10-CM: Z00.938 ICD-9-CM: 560.9  3/30/2019 Unknown Plan: The patient will be admitted for recurrent small bowel obstruction. KUB shows air-fluid levels but no distended or enlarged small bowel loops. We will admit the patient placed on clear liquid diet with serial exams. I will repeat a KUB in the morning. If there is no improvement then we will order a CT of the abdomen pelvis.  
 
 
Hans Hays, DO

## 2019-03-30 NOTE — PROGRESS NOTES
TRANSFER - IN REPORT: 
 
Verbal report [facility] from [facility] (name[facility] on Shakila Janews  being received from ED (unit) for routine progression of care Report consisted of patients Situation, Background, Assessment and  
Recommendations(SBAR). Information from the following report(s) SBAR, Kardex, ED Summary, Intake/Output, MAR and Recent Results was reviewed with the receiving nurse. Opportunity for questions and clarification was provided. Assessment completed upon patients arrival to unit and care assumed.   
 
 
 
\

## 2019-03-30 NOTE — PROGRESS NOTES
Patient requested for MD to be called about his night time medications. Spoke with MD Milton Medina. Telephone order received from MD to order patients medications: 
 
4 mg of Klonopin at bed time and 2 mg of Klonopin at 0200 
 
2.5 mg of Olanzapine at bed time and 5 mg of Olanzapine at 0200.

## 2019-03-30 NOTE — ED NOTES
TRANSFER - OUT REPORT: 
 
Verbal report given to Diego Rick RN on [address]  being transferred to [address] [place] [address] for routine progression of care Report consisted of patients Situation, Background, Assessment and  
Recommendations(SBAR). Information from the following report(s) ED Summary, MAR and Recent Results was reviewed with the receiving nurse. Lines:  
Peripheral IV 03/30/19 Left Antecubital (Active) Site Assessment Clean, dry, & intact 3/30/2019 12:40 PM  
Phlebitis Assessment 0 3/30/2019 12:40 PM  
Infiltration Assessment 0 3/30/2019 12:40 PM  
Dressing Status Clean, dry, & intact 3/30/2019 12:40 PM  
Action Taken Blood drawn 3/30/2019 12:40 PM  
  
 
Opportunity for questions and clarification was provided. Patient transported with: 
 [employer]

## 2019-03-31 ENCOUNTER — APPOINTMENT (OUTPATIENT)
Dept: GENERAL RADIOLOGY | Age: 74
DRG: 330 | End: 2019-03-31
Attending: SURGERY
Payer: MEDICARE

## 2019-03-31 ENCOUNTER — APPOINTMENT (OUTPATIENT)
Dept: CT IMAGING | Age: 74
DRG: 330 | End: 2019-03-31
Attending: SURGERY
Payer: MEDICARE

## 2019-03-31 LAB
ANION GAP SERPL CALC-SCNC: 5 MMOL/L
BUN SERPL-MCNC: 22 MG/DL (ref 8–23)
CALCIUM SERPL-MCNC: 8.6 MG/DL (ref 8.3–10.4)
CHLORIDE SERPL-SCNC: 103 MMOL/L (ref 98–107)
CO2 SERPL-SCNC: 30 MMOL/L (ref 21–32)
CREAT SERPL-MCNC: 0.97 MG/DL (ref 0.8–1.5)
GLUCOSE SERPL-MCNC: 132 MG/DL (ref 65–100)
POTASSIUM SERPL-SCNC: 4 MMOL/L (ref 3.5–5.1)
SODIUM SERPL-SCNC: 138 MMOL/L (ref 136–145)

## 2019-03-31 PROCEDURE — 74011250637 HC RX REV CODE- 250/637: Performed by: SURGERY

## 2019-03-31 PROCEDURE — 80048 BASIC METABOLIC PNL TOTAL CA: CPT

## 2019-03-31 PROCEDURE — 74018 RADEX ABDOMEN 1 VIEW: CPT

## 2019-03-31 PROCEDURE — 74011000258 HC RX REV CODE- 258: Performed by: SURGERY

## 2019-03-31 PROCEDURE — 36415 COLL VENOUS BLD VENIPUNCTURE: CPT

## 2019-03-31 PROCEDURE — 74011636320 HC RX REV CODE- 636/320: Performed by: SURGERY

## 2019-03-31 PROCEDURE — 74177 CT ABD & PELVIS W/CONTRAST: CPT

## 2019-03-31 PROCEDURE — 65270000029 HC RM PRIVATE

## 2019-03-31 PROCEDURE — 77030032490 HC SLV COMPR SCD KNE COVD -B

## 2019-03-31 PROCEDURE — 74011250636 HC RX REV CODE- 250/636: Performed by: SURGERY

## 2019-03-31 RX ORDER — SODIUM CHLORIDE 0.9 % (FLUSH) 0.9 %
10 SYRINGE (ML) INJECTION
Status: COMPLETED | OUTPATIENT
Start: 2019-03-31 | End: 2019-03-31

## 2019-03-31 RX ADMIN — VITAMIN D, TAB 1000IU (100/BT) 2000 UNITS: 25 TAB at 10:11

## 2019-03-31 RX ADMIN — OLANZAPINE 2.5 MG: 2.5 TABLET, FILM COATED ORAL at 22:01

## 2019-03-31 RX ADMIN — VITAMIN D, TAB 1000IU (100/BT) 2000 UNITS: 25 TAB at 17:47

## 2019-03-31 RX ADMIN — AMITRIPTYLINE HYDROCHLORIDE 100 MG: 50 TABLET, FILM COATED ORAL at 22:01

## 2019-03-31 RX ADMIN — Medication 10 ML: at 16:20

## 2019-03-31 RX ADMIN — Medication 1 AMPULE: at 22:01

## 2019-03-31 RX ADMIN — ONDANSETRON 4 MG: 2 INJECTION INTRAMUSCULAR; INTRAVENOUS at 22:02

## 2019-03-31 RX ADMIN — CLONAZEPAM 2 MG: 1 TABLET ORAL at 02:08

## 2019-03-31 RX ADMIN — ONDANSETRON 4 MG: 2 INJECTION INTRAMUSCULAR; INTRAVENOUS at 14:53

## 2019-03-31 RX ADMIN — SODIUM CHLORIDE 100 ML: 900 INJECTION, SOLUTION INTRAVENOUS at 16:20

## 2019-03-31 RX ADMIN — IOPAMIDOL 100 ML: 755 INJECTION, SOLUTION INTRAVENOUS at 16:20

## 2019-03-31 RX ADMIN — OLANZAPINE 5 MG: 2.5 TABLET, FILM COATED ORAL at 02:08

## 2019-03-31 RX ADMIN — ONDANSETRON 4 MG: 2 INJECTION INTRAMUSCULAR; INTRAVENOUS at 10:11

## 2019-03-31 RX ADMIN — Medication 1 AMPULE: at 10:11

## 2019-03-31 RX ADMIN — CLONAZEPAM 4 MG: 1 TABLET ORAL at 22:01

## 2019-03-31 RX ADMIN — DEXTROSE MONOHYDRATE, SODIUM CHLORIDE, AND POTASSIUM CHLORIDE 100 ML/HR: 50; 4.5; 1.49 INJECTION, SOLUTION INTRAVENOUS at 05:08

## 2019-03-31 RX ADMIN — LEVOTHYROXINE SODIUM 125 MCG: 125 TABLET ORAL at 07:12

## 2019-03-31 RX ADMIN — DIATRIZOATE MEGLUMINE AND DIATRIZOATE SODIUM 15 ML: 660; 100 LIQUID ORAL; RECTAL at 14:48

## 2019-03-31 NOTE — PROGRESS NOTES
03/30/19 2015 Dual Skin Pressure Injury Assessment Dual Skin Pressure Injury Assessment WDL Second Care Provider (Based on [address]) [facility], [address] [facility]

## 2019-03-31 NOTE — PROGRESS NOTES
Admission assessment complete. Patient A&O x 4. Respirations present, even and unlabored. Breath sounds clear. Patient on room air. Heart sounds regular. Abdomen soft and tender. Colostomy present. Patient needs met. No distress noted. Bed low and locked. Call light within reach. Will continue to monitor hourly during shift.

## 2019-03-31 NOTE — PROGRESS NOTES
Pt complains of nausea. PRN Zofran 4 mg given slow, IVP. Safety measures in place, call light within reach. Will continue to monitor.

## 2019-03-31 NOTE — PROGRESS NOTES
Shift assessment complete. Pt alert and oriented x4. Respirations even and unlabored. Lung sounds clear on room air. HR regular. Abdomen tender with hypoactive bowel sounds heard in all four quadrants. Colostomy present, bag intact. Skin intact, no edema noted. IV patent and infusing. Bilateral SCDs in place. No needs voiced at this time. Safety measures in place, call light within reach. Will continue to monitor.

## 2019-03-31 NOTE — PROGRESS NOTES
[address] [facility], [address] [place], [address] PLAN: CT abdomen pelvis with p.o. and IV contrast 
Continue serial exams Surgical recommendations after CT abdomen pelvis ASSESSMENT:  Admit Date: 3/30/2019 * No surgery found *  * No surgery found * Principal Problem: 
  Small bowel obstruction ([address] (3/30/2019) SUBJECTIVE: Patient continues to have 0 ostomy output. He denies vomiting but complains of nausea. He is ambulating the floors without significant pain. He is agreeable to CT of the abdomen pelvis to gain more information that might possibly lead to surgery and resolution of his obstruction. OBJECTIVE: 
Constitutional: Alert oriented cooperative patient in no acute distress; appears stated age Visit Vitals /85 (BP 1 Location: Right arm, BP Patient Position: At rest) Pulse 84 Temp 98.2 °F (36.8 °C) Resp 18 Ht 5' 8\" (1.727 m) Wt 140 lb (63.5 kg) SpO2 93% BMI 21.29 kg/m² Eyes:Sclera are clear. ENMT: no external lesions gross hearing normal; no obvious neck masses, no ear or lip lesions CV: RRR. Normal perfusion Resp: No JVD. Breathing is  non-labored; no audible wheezing. GI: Abdomen is flat but slightly distended. There is no ostomy output. There is decreased bowel sounds. No tenderness to palpation Musculoskeletal: unremarkable with normal function. No embolic signs or cyanosis. Neuro:  Oriented; moves all 4; no focal deficits Psychiatric: normal affect and mood, no memory impairment Patient Vitals for the past 24 hrs: 
 BP Temp Pulse Resp SpO2  
03/31/19 1155 135/85 98.2 °F (36.8 °C) 84 18 93 % 03/31/19 0837 147/80 97.2 °F (36.2 °C) 84 18 94 % 03/31/19 0350 137/79 97.3 °F (36.3 °C) 88 15 94 % 03/30/19 2339 134/80 96.2 °F (35.7 °C) 86 16 96 % 03/30/19 1939 (!) 144/102 98.7 °F (37.1 °C) 92 16 93 % 03/30/19 1800 138/79 98 °F (36.7 °C) 88 16 97 % 03/30/19 1740 142/84  78 16 97 % 03/30/19 1640 142/84    98 % 03/30/19 1540 145/83    96 % 03/30/19 1440 143/79    97 % Labs:   
Recent Labs  
  03/30/19 
1227 WBC 8.2 HGB 13.2*  
   
K 4.0  
 CO2 28 BUN 35* CREA 1.26  
* TBILI 0.5 SGOT 13* ALT 40 AP 97 LPSE [address] Maryana Saúl, DO

## 2019-03-31 NOTE — PROGRESS NOTES
Pt completed PO contrast. Rica Herrera in [address] notified. Pt to be transported to CT at 1600.

## 2019-04-01 PROCEDURE — 74011250637 HC RX REV CODE- 250/637: Performed by: SURGERY

## 2019-04-01 PROCEDURE — 74011250636 HC RX REV CODE- 250/636: Performed by: SURGERY

## 2019-04-01 PROCEDURE — 65270000029 HC RM PRIVATE

## 2019-04-01 RX ADMIN — Medication 1 AMPULE: at 09:00

## 2019-04-01 RX ADMIN — CLONAZEPAM 2 MG: 1 TABLET ORAL at 01:41

## 2019-04-01 RX ADMIN — VITAMIN D, TAB 1000IU (100/BT) 2000 UNITS: 25 TAB at 08:40

## 2019-04-01 RX ADMIN — OLANZAPINE 5 MG: 2.5 TABLET, FILM COATED ORAL at 01:41

## 2019-04-01 RX ADMIN — OLANZAPINE 2.5 MG: 2.5 TABLET, FILM COATED ORAL at 21:51

## 2019-04-01 RX ADMIN — CLONAZEPAM 4 MG: 1 TABLET ORAL at 21:51

## 2019-04-01 RX ADMIN — LEVOTHYROXINE SODIUM 125 MCG: 125 TABLET ORAL at 06:34

## 2019-04-01 RX ADMIN — Medication 1 AMPULE: at 21:51

## 2019-04-01 RX ADMIN — VITAMIN D, TAB 1000IU (100/BT) 2000 UNITS: 25 TAB at 17:31

## 2019-04-01 RX ADMIN — AMITRIPTYLINE HYDROCHLORIDE 100 MG: 50 TABLET, FILM COATED ORAL at 21:51

## 2019-04-01 RX ADMIN — DEXTROSE MONOHYDRATE, SODIUM CHLORIDE, AND POTASSIUM CHLORIDE 100 ML/HR: 50; 4.5; 1.49 INJECTION, SOLUTION INTRAVENOUS at 23:35

## 2019-04-01 NOTE — PROGRESS NOTES
Shift assessment complete. Respirations present. Even and unlabored. No s/s of distress. Zero c/o pain at this time. Call light within reach. Encouraged patient to call for assistance. Patient verbalizes understanding. See Doc Flowsheet for assessment details. Patient resting in bed. Ambulating in room and hallway. Saline well intact to left antecubital with fluids infusing. Colostomy patient doing self care. Denies any pain.

## 2019-04-01 NOTE — PROGRESS NOTES
Shift assessment complete. Pt resting quietly in bed. No signs of acute distress. Resp even and unlabored. Alert and oriented x4. Hypoactive bowel sounds. Pt reports very little PO intake. Pt also reports small amount of output of loose stool in ostomy. Call light within reach. Pt instructed to call for assistance.

## 2019-04-01 NOTE — PROGRESS NOTES
Shift assessment complete. Patient A&O x 4. Respirations present, even and unlabored. Breath sounds clear. Patient on room air. Heart sounds regular. Bowel sounds hypoactive in all 4 quadrants. Abdomen soft and tender. IV infusing without difficulty. Patient needs met. No distress noted. Bed low and locked. Call light within reach. Will continue to monitor hourly during shift.

## 2019-04-01 NOTE — PROGRESS NOTES
Spiritual Care Visit, initial visit. Patient was standing at [facility], and seemed busy.  introduced himself, and left a card. Invited patient to call as needed. Visit by Darrell Barakat, Staff .  Jo., Franklyn, B.A.

## 2019-04-01 NOTE — PROGRESS NOTES
Shift assessment complete. Patient A&O x 4. Respirations present, even and unlabored. Breath sounds clear. Patient on room air. Heart sounds regular. Bowel sounds active. Colostomy present, self care. Patient reports having output through colostomy today. IV infusing without difficulty. Patient needs met. No distress noted. Bed low and locked. Call light within reach. Will continue to monitor hourly during shift.

## 2019-04-01 NOTE — PROGRESS NOTES
Care Management Interventions PCP Verified by CM: Yes Transition of Care Consult (CM Consult): Discharge Planning Current Support Network: Own Home, Lives Alone Confirm Follow Up Transport: Family Discharge Location Discharge Placement: Home Chart reviewed. Per MD notes pt has recurrent SBO and will likely schedule surgery for later this week. SW will continue to follow for any d/c needs that may arise.

## 2019-04-02 PROCEDURE — 65270000029 HC RM PRIVATE

## 2019-04-02 PROCEDURE — 74011250637 HC RX REV CODE- 250/637: Performed by: SURGERY

## 2019-04-02 PROCEDURE — 74011250636 HC RX REV CODE- 250/636: Performed by: SURGERY

## 2019-04-02 RX ADMIN — Medication 1 AMPULE: at 08:54

## 2019-04-02 RX ADMIN — DEXTROSE MONOHYDRATE, SODIUM CHLORIDE, AND POTASSIUM CHLORIDE 100 ML/HR: 50; 4.5; 1.49 INJECTION, SOLUTION INTRAVENOUS at 08:56

## 2019-04-02 RX ADMIN — VITAMIN D, TAB 1000IU (100/BT) 2000 UNITS: 25 TAB at 08:54

## 2019-04-02 RX ADMIN — VITAMIN D, TAB 1000IU (100/BT) 2000 UNITS: 25 TAB at 17:53

## 2019-04-02 RX ADMIN — OLANZAPINE 2.5 MG: 2.5 TABLET, FILM COATED ORAL at 22:12

## 2019-04-02 RX ADMIN — LEVOTHYROXINE SODIUM 125 MCG: 125 TABLET ORAL at 05:59

## 2019-04-02 RX ADMIN — DEXTROSE MONOHYDRATE, SODIUM CHLORIDE, AND POTASSIUM CHLORIDE 100 ML/HR: 50; 4.5; 1.49 INJECTION, SOLUTION INTRAVENOUS at 18:33

## 2019-04-02 RX ADMIN — CLONAZEPAM 4 MG: 1 TABLET ORAL at 22:12

## 2019-04-02 RX ADMIN — Medication 1 AMPULE: at 21:23

## 2019-04-02 RX ADMIN — OLANZAPINE 5 MG: 2.5 TABLET, FILM COATED ORAL at 02:49

## 2019-04-02 RX ADMIN — AMITRIPTYLINE HYDROCHLORIDE 100 MG: 50 TABLET, FILM COATED ORAL at 22:12

## 2019-04-02 RX ADMIN — CLONAZEPAM 2 MG: 1 TABLET ORAL at 02:50

## 2019-04-02 NOTE — PROGRESS NOTES
Subjective:  
 
Patient reports copious stool per ostomy overnight, again. Reports noting pieces of food in otherwise mostly watery output. Feels well, no cramps or pains. Objective:  
 
Visit Vitals /82 (BP 1 Location: Right arm, BP Patient Position: At rest;Supine) Pulse 65 Temp 95 °F (35 °C) Resp 18 Ht 5' 8\" (1.727 m) Wt 140 lb (63.5 kg) SpO2 98% BMI 21.29 kg/m²  
; Temp (24hrs), Av.8 °F (36 °C), Min:95 °F (35 °C), Max:98.2 °F (36.8 °C) I/O reviewed in graphics Physical Exam:  
 General-in no distress. Lungs-CTA bilaterally without rales, rhonchi, or wheezes. Bases are not diminished. Respiratory effort isNormal 
 
Heart- Regular rate and rhythm Abdomen-distention decreased  from previous exam.  Bowel sounds are very hypoactive, nonobstructive. There is no tenderness in the entire abdomen Labs: No results found for this or any previous visit (from the past 24 hour(s)). Data Review - Assessment:  
 
Principal Problem: 
  Small bowel obstruction ([address] (3/30/2019) Plan/Recommendations/Medical Decision Making:  
 
Again discussed options of progressing diet and seeing if he tolerates this versus proceeding with now semi-elective lysis of adhesions for his recurrent SBO. He reports that his symptoms at home have been very severe, intolerable to the point of \"I can't live like that. \"  As such, he is interested in proceeding with lysis of adhesions. He is concerned, and rightfully so considering past history, about hernia formation. He is definitely at increased risk, and may benefit from prophylactic biologic mesh onlay as was done at the time of his last hernia primary closure when infected mesh was removed. Will proceed with laparotomy, lysis of adhesions. Technical details of the procedure are reviewed.   Risks reviewed include anesthetic risks, bleeding, infection, visceral injury, incomplete symptom resolution and/or recurrent adhesions despite placement of an adhesion barrier. All questions are answered.

## 2019-04-02 NOTE — PROGRESS NOTES
Shift assessment complete. Respirations present. Even and unlabored. No s/s of distress. Zero c/o pain at this time. Call light within reach. Encouraged patient to call for assistance. Patient verbalizes understanding. See Doc Flowsheet for assessment details. Patient resting in bed. Saline well intact to left arm with fluids infusing. Ambulates to bathroom and hallway. Denies any pain.

## 2019-04-03 ENCOUNTER — ANESTHESIA EVENT (OUTPATIENT)
Dept: SURGERY | Age: 74
DRG: 330 | End: 2019-04-03
Payer: MEDICARE

## 2019-04-03 ENCOUNTER — ANESTHESIA (OUTPATIENT)
Dept: SURGERY | Age: 74
DRG: 330 | End: 2019-04-03
Payer: MEDICARE

## 2019-04-03 LAB
ANION GAP SERPL CALC-SCNC: 4 MMOL/L
BUN SERPL-MCNC: 7 MG/DL (ref 8–23)
CALCIUM SERPL-MCNC: 8.5 MG/DL (ref 8.3–10.4)
CHLORIDE SERPL-SCNC: 104 MMOL/L (ref 98–107)
CO2 SERPL-SCNC: 30 MMOL/L (ref 21–32)
CREAT SERPL-MCNC: 0.93 MG/DL (ref 0.8–1.5)
ERYTHROCYTE [DISTWIDTH] IN BLOOD BY AUTOMATED COUNT: 12.4 % (ref 11.9–14.6)
GLUCOSE SERPL-MCNC: 97 MG/DL (ref 65–100)
HCT VFR BLD AUTO: 33.5 % (ref 41.1–50.3)
HGB BLD-MCNC: 10.6 G/DL (ref 13.6–17.2)
MCH RBC QN AUTO: 28.7 PG (ref 26.1–32.9)
MCHC RBC AUTO-ENTMCNC: 31.6 G/DL (ref 31.4–35)
MCV RBC AUTO: 90.8 FL (ref 79.6–97.8)
NRBC # BLD: 0 K/UL (ref 0–0.2)
PLATELET # BLD AUTO: 160 K/UL (ref 150–450)
PMV BLD AUTO: 9.5 FL (ref 9.4–12.3)
POTASSIUM SERPL-SCNC: 3.7 MMOL/L (ref 3.5–5.1)
RBC # BLD AUTO: 3.69 M/UL (ref 4.23–5.6)
SODIUM SERPL-SCNC: 138 MMOL/L (ref 136–145)
WBC # BLD AUTO: 3.1 K/UL (ref 4.3–11.1)

## 2019-04-03 PROCEDURE — 74011250636 HC RX REV CODE- 250/636

## 2019-04-03 PROCEDURE — 74011000250 HC RX REV CODE- 250: Performed by: ANESTHESIOLOGY

## 2019-04-03 PROCEDURE — 77030011808 HC STPLR ENDOSCOPIC J&J -D: Performed by: SURGERY

## 2019-04-03 PROCEDURE — 77030010293 HC STPLR INT TI J&J -B: Performed by: SURGERY

## 2019-04-03 PROCEDURE — 77030009979 HC RELD STPLR TCR J&J -C: Performed by: SURGERY

## 2019-04-03 PROCEDURE — 74011250636 HC RX REV CODE- 250/636: Performed by: ANESTHESIOLOGY

## 2019-04-03 PROCEDURE — 74011250637 HC RX REV CODE- 250/637: Performed by: SURGERY

## 2019-04-03 PROCEDURE — 74011250636 HC RX REV CODE- 250/636: Performed by: SURGERY

## 2019-04-03 PROCEDURE — 77010033678 HC OXYGEN DAILY

## 2019-04-03 PROCEDURE — 0DN80ZZ RELEASE SMALL INTESTINE, OPEN APPROACH: ICD-10-PCS | Performed by: SURGERY

## 2019-04-03 PROCEDURE — 77030032490 HC SLV COMPR SCD KNE COVD -B: Performed by: SURGERY

## 2019-04-03 PROCEDURE — 77030020255 HC SOL INJ LR 1000ML BG

## 2019-04-03 PROCEDURE — 74011000250 HC RX REV CODE- 250

## 2019-04-03 PROCEDURE — 85027 COMPLETE CBC AUTOMATED: CPT

## 2019-04-03 PROCEDURE — 77030009969 HC RELD STPLR GIA COVD -B: Performed by: SURGERY

## 2019-04-03 PROCEDURE — 77030009985 HC RELD STPLR XR J&J -B: Performed by: SURGERY

## 2019-04-03 PROCEDURE — 76010000175 HC OR TIME 4 TO 4.5 HR INTENSV-TIER 1: Performed by: SURGERY

## 2019-04-03 PROCEDURE — 76210000006 HC OR PH I REC 0.5 TO 1 HR: Performed by: SURGERY

## 2019-04-03 PROCEDURE — 77030003029 HC SUT VCRL J&J -B: Performed by: SURGERY

## 2019-04-03 PROCEDURE — 74011000258 HC RX REV CODE- 258: Performed by: SURGERY

## 2019-04-03 PROCEDURE — 77030039425 HC BLD LARYNG TRULITE DISP TELE -A: Performed by: ANESTHESIOLOGY

## 2019-04-03 PROCEDURE — C1765 ADHESION BARRIER: HCPCS | Performed by: SURGERY

## 2019-04-03 PROCEDURE — 65270000029 HC RM PRIVATE

## 2019-04-03 PROCEDURE — 77030008703 HC TU ET UNCUF COVD -A: Performed by: ANESTHESIOLOGY

## 2019-04-03 PROCEDURE — 77030018836 HC SOL IRR NACL ICUM -A: Performed by: SURGERY

## 2019-04-03 PROCEDURE — 36415 COLL VENOUS BLD VENIPUNCTURE: CPT

## 2019-04-03 PROCEDURE — 77030008477 HC STYL SATN SLP COVD -A: Performed by: ANESTHESIOLOGY

## 2019-04-03 PROCEDURE — 0TQB0ZZ REPAIR BLADDER, OPEN APPROACH: ICD-10-PCS | Performed by: SURGERY

## 2019-04-03 PROCEDURE — 76060000039 HC ANESTHESIA 4 TO 4.5 HR: Performed by: SURGERY

## 2019-04-03 PROCEDURE — 77030031139 HC SUT VCRL2 J&J -A: Performed by: SURGERY

## 2019-04-03 PROCEDURE — 77030011266 HC ELECTRD BLD INSL COVD -A: Performed by: SURGERY

## 2019-04-03 PROCEDURE — 0DNW0ZZ RELEASE PERITONEUM, OPEN APPROACH: ICD-10-PCS | Performed by: SURGERY

## 2019-04-03 PROCEDURE — 77030008468 HC STPLR SKN VISIS TELE -A: Performed by: SURGERY

## 2019-04-03 PROCEDURE — 80048 BASIC METABOLIC PNL TOTAL CA: CPT

## 2019-04-03 PROCEDURE — P9045 ALBUMIN (HUMAN), 5%, 250 ML: HCPCS

## 2019-04-03 PROCEDURE — 77030019940 HC BLNKT HYPOTHRM STRY -B: Performed by: ANESTHESIOLOGY

## 2019-04-03 PROCEDURE — 77030002996 HC SUT SLK J&J -A: Performed by: SURGERY

## 2019-04-03 PROCEDURE — 77030010541: Performed by: SURGERY

## 2019-04-03 PROCEDURE — 77030002986 HC SUT PROL J&J -A: Performed by: SURGERY

## 2019-04-03 PROCEDURE — 0DB80ZZ EXCISION OF SMALL INTESTINE, OPEN APPROACH: ICD-10-PCS | Performed by: SURGERY

## 2019-04-03 PROCEDURE — 94760 N-INVAS EAR/PLS OXIMETRY 1: CPT

## 2019-04-03 PROCEDURE — 77030002966 HC SUT PDS J&J -A: Performed by: SURGERY

## 2019-04-03 PROCEDURE — 77030034849: Performed by: SURGERY

## 2019-04-03 RX ORDER — MIDAZOLAM HYDROCHLORIDE 1 MG/ML
2 INJECTION, SOLUTION INTRAMUSCULAR; INTRAVENOUS
Status: DISCONTINUED | OUTPATIENT
Start: 2019-04-03 | End: 2019-04-03 | Stop reason: HOSPADM

## 2019-04-03 RX ORDER — FENTANYL CITRATE 50 UG/ML
INJECTION, SOLUTION INTRAMUSCULAR; INTRAVENOUS AS NEEDED
Status: DISCONTINUED | OUTPATIENT
Start: 2019-04-03 | End: 2019-04-03 | Stop reason: HOSPADM

## 2019-04-03 RX ORDER — NEOSTIGMINE METHYLSULFATE 1 MG/ML
INJECTION INTRAVENOUS AS NEEDED
Status: DISCONTINUED | OUTPATIENT
Start: 2019-04-03 | End: 2019-04-03 | Stop reason: HOSPADM

## 2019-04-03 RX ORDER — FENTANYL CITRATE 50 UG/ML
100 INJECTION, SOLUTION INTRAMUSCULAR; INTRAVENOUS ONCE
Status: CANCELLED | OUTPATIENT
Start: 2019-04-03 | End: 2019-04-03

## 2019-04-03 RX ORDER — MIDAZOLAM HYDROCHLORIDE 1 MG/ML
5 INJECTION, SOLUTION INTRAMUSCULAR; INTRAVENOUS ONCE
Status: CANCELLED | OUTPATIENT
Start: 2019-04-03 | End: 2019-04-03

## 2019-04-03 RX ORDER — ACETAMINOPHEN 10 MG/ML
INJECTION, SOLUTION INTRAVENOUS AS NEEDED
Status: DISCONTINUED | OUTPATIENT
Start: 2019-04-03 | End: 2019-04-03 | Stop reason: HOSPADM

## 2019-04-03 RX ORDER — ALBUMIN HUMAN 50 G/1000ML
SOLUTION INTRAVENOUS AS NEEDED
Status: DISCONTINUED | OUTPATIENT
Start: 2019-04-03 | End: 2019-04-03 | Stop reason: HOSPADM

## 2019-04-03 RX ORDER — CEFOXITIN 2 G/1
2 INJECTION, POWDER, FOR SOLUTION INTRAVENOUS
Status: DISCONTINUED | OUTPATIENT
Start: 2019-04-03 | End: 2019-04-03 | Stop reason: SDUPTHER

## 2019-04-03 RX ORDER — HYDROMORPHONE HYDROCHLORIDE 2 MG/ML
0.5 INJECTION, SOLUTION INTRAMUSCULAR; INTRAVENOUS; SUBCUTANEOUS
Status: DISCONTINUED | OUTPATIENT
Start: 2019-04-03 | End: 2019-04-03 | Stop reason: HOSPADM

## 2019-04-03 RX ORDER — PROMETHAZINE HYDROCHLORIDE 25 MG/ML
INJECTION, SOLUTION INTRAMUSCULAR; INTRAVENOUS
Status: ACTIVE
Start: 2019-04-03 | End: 2019-04-04

## 2019-04-03 RX ORDER — SUCCINYLCHOLINE CHLORIDE 20 MG/ML
INJECTION INTRAMUSCULAR; INTRAVENOUS AS NEEDED
Status: DISCONTINUED | OUTPATIENT
Start: 2019-04-03 | End: 2019-04-03 | Stop reason: HOSPADM

## 2019-04-03 RX ORDER — OXYCODONE HYDROCHLORIDE 5 MG/1
5 TABLET ORAL
Status: DISCONTINUED | OUTPATIENT
Start: 2019-04-03 | End: 2019-04-03 | Stop reason: HOSPADM

## 2019-04-03 RX ORDER — LIDOCAINE HYDROCHLORIDE 20 MG/ML
INJECTION, SOLUTION EPIDURAL; INFILTRATION; INTRACAUDAL; PERINEURAL AS NEEDED
Status: DISCONTINUED | OUTPATIENT
Start: 2019-04-03 | End: 2019-04-03 | Stop reason: HOSPADM

## 2019-04-03 RX ORDER — HYDROCODONE BITARTRATE AND ACETAMINOPHEN 5; 325 MG/1; MG/1
2 TABLET ORAL AS NEEDED
Status: DISCONTINUED | OUTPATIENT
Start: 2019-04-03 | End: 2019-04-03 | Stop reason: HOSPADM

## 2019-04-03 RX ORDER — GLYCOPYRROLATE 0.2 MG/ML
INJECTION INTRAMUSCULAR; INTRAVENOUS AS NEEDED
Status: DISCONTINUED | OUTPATIENT
Start: 2019-04-03 | End: 2019-04-03 | Stop reason: HOSPADM

## 2019-04-03 RX ORDER — EPHEDRINE SULFATE 50 MG/ML
INJECTION, SOLUTION INTRAVENOUS AS NEEDED
Status: DISCONTINUED | OUTPATIENT
Start: 2019-04-03 | End: 2019-04-03 | Stop reason: HOSPADM

## 2019-04-03 RX ORDER — MIDAZOLAM HYDROCHLORIDE 1 MG/ML
INJECTION, SOLUTION INTRAMUSCULAR; INTRAVENOUS AS NEEDED
Status: DISCONTINUED | OUTPATIENT
Start: 2019-04-03 | End: 2019-04-03 | Stop reason: HOSPADM

## 2019-04-03 RX ORDER — SODIUM CHLORIDE, SODIUM LACTATE, POTASSIUM CHLORIDE, CALCIUM CHLORIDE 600; 310; 30; 20 MG/100ML; MG/100ML; MG/100ML; MG/100ML
75 INJECTION, SOLUTION INTRAVENOUS CONTINUOUS
Status: DISCONTINUED | OUTPATIENT
Start: 2019-04-03 | End: 2019-04-03 | Stop reason: HOSPADM

## 2019-04-03 RX ORDER — ROCURONIUM BROMIDE 10 MG/ML
INJECTION, SOLUTION INTRAVENOUS AS NEEDED
Status: DISCONTINUED | OUTPATIENT
Start: 2019-04-03 | End: 2019-04-03 | Stop reason: HOSPADM

## 2019-04-03 RX ORDER — PROPOFOL 10 MG/ML
INJECTION, EMULSION INTRAVENOUS AS NEEDED
Status: DISCONTINUED | OUTPATIENT
Start: 2019-04-03 | End: 2019-04-03 | Stop reason: HOSPADM

## 2019-04-03 RX ORDER — DEXAMETHASONE SODIUM PHOSPHATE 4 MG/ML
INJECTION, SOLUTION INTRA-ARTICULAR; INTRALESIONAL; INTRAMUSCULAR; INTRAVENOUS; SOFT TISSUE AS NEEDED
Status: DISCONTINUED | OUTPATIENT
Start: 2019-04-03 | End: 2019-04-03 | Stop reason: HOSPADM

## 2019-04-03 RX ORDER — ONDANSETRON 2 MG/ML
INJECTION INTRAMUSCULAR; INTRAVENOUS AS NEEDED
Status: DISCONTINUED | OUTPATIENT
Start: 2019-04-03 | End: 2019-04-03 | Stop reason: HOSPADM

## 2019-04-03 RX ORDER — LIDOCAINE HYDROCHLORIDE 10 MG/ML
0.1 INJECTION INFILTRATION; PERINEURAL AS NEEDED
Status: DISCONTINUED | OUTPATIENT
Start: 2019-04-03 | End: 2019-04-03 | Stop reason: HOSPADM

## 2019-04-03 RX ADMIN — EPHEDRINE SULFATE 10 MG: 50 INJECTION, SOLUTION INTRAVENOUS at 15:07

## 2019-04-03 RX ADMIN — ROCURONIUM BROMIDE 10 MG: 10 INJECTION, SOLUTION INTRAVENOUS at 15:21

## 2019-04-03 RX ADMIN — SODIUM CHLORIDE, SODIUM LACTATE, POTASSIUM CHLORIDE, AND CALCIUM CHLORIDE: 600; 310; 30; 20 INJECTION, SOLUTION INTRAVENOUS at 15:35

## 2019-04-03 RX ADMIN — SODIUM CHLORIDE, SODIUM LACTATE, POTASSIUM CHLORIDE, AND CALCIUM CHLORIDE 75 ML/HR: 600; 310; 30; 20 INJECTION, SOLUTION INTRAVENOUS at 13:46

## 2019-04-03 RX ADMIN — CLONAZEPAM 2 MG: 1 TABLET ORAL at 02:46

## 2019-04-03 RX ADMIN — ALBUMIN HUMAN 250 ML: 50 SOLUTION INTRAVENOUS at 18:25

## 2019-04-03 RX ADMIN — ROCURONIUM BROMIDE 10 MG: 10 INJECTION, SOLUTION INTRAVENOUS at 17:07

## 2019-04-03 RX ADMIN — DEXTROSE MONOHYDRATE, SODIUM CHLORIDE, AND POTASSIUM CHLORIDE 100 ML/HR: 50; 4.5; 1.49 INJECTION, SOLUTION INTRAVENOUS at 23:53

## 2019-04-03 RX ADMIN — DEXTROSE MONOHYDRATE, SODIUM CHLORIDE, AND POTASSIUM CHLORIDE 100 ML/HR: 50; 4.5; 1.49 INJECTION, SOLUTION INTRAVENOUS at 02:50

## 2019-04-03 RX ADMIN — AMITRIPTYLINE HYDROCHLORIDE 100 MG: 50 TABLET, FILM COATED ORAL at 21:49

## 2019-04-03 RX ADMIN — CEFOXITIN 2 G: 2 INJECTION, POWDER, FOR SOLUTION INTRAVENOUS at 14:34

## 2019-04-03 RX ADMIN — Medication 1 AMPULE: at 21:50

## 2019-04-03 RX ADMIN — OLANZAPINE 5 MG: 2.5 TABLET, FILM COATED ORAL at 21:50

## 2019-04-03 RX ADMIN — CEFOXITIN SODIUM 2 G: 2 POWDER, FOR SOLUTION INTRAVENOUS at 23:57

## 2019-04-03 RX ADMIN — EPHEDRINE SULFATE 10 MG: 50 INJECTION, SOLUTION INTRAVENOUS at 16:12

## 2019-04-03 RX ADMIN — SUCCINYLCHOLINE CHLORIDE 160 MG: 20 INJECTION INTRAMUSCULAR; INTRAVENOUS at 14:42

## 2019-04-03 RX ADMIN — OXYCODONE AND ACETAMINOPHEN 1 TABLET: 5; 325 TABLET ORAL at 21:50

## 2019-04-03 RX ADMIN — FENTANYL CITRATE 50 MCG: 50 INJECTION, SOLUTION INTRAMUSCULAR; INTRAVENOUS at 14:42

## 2019-04-03 RX ADMIN — PROMETHAZINE HYDROCHLORIDE 3.25 MG: 25 INJECTION INTRAMUSCULAR; INTRAVENOUS at 19:28

## 2019-04-03 RX ADMIN — MIDAZOLAM HYDROCHLORIDE 2 MG: 1 INJECTION, SOLUTION INTRAMUSCULAR; INTRAVENOUS at 14:34

## 2019-04-03 RX ADMIN — CEFOXITIN 2 G: 2 INJECTION, POWDER, FOR SOLUTION INTRAVENOUS at 16:38

## 2019-04-03 RX ADMIN — EPHEDRINE SULFATE 10 MG: 50 INJECTION, SOLUTION INTRAVENOUS at 15:34

## 2019-04-03 RX ADMIN — SODIUM CHLORIDE, SODIUM LACTATE, POTASSIUM CHLORIDE, AND CALCIUM CHLORIDE: 600; 310; 30; 20 INJECTION, SOLUTION INTRAVENOUS at 17:33

## 2019-04-03 RX ADMIN — LEVOTHYROXINE SODIUM 125 MCG: 125 TABLET ORAL at 06:14

## 2019-04-03 RX ADMIN — FENTANYL CITRATE 50 MCG: 50 INJECTION, SOLUTION INTRAMUSCULAR; INTRAVENOUS at 16:24

## 2019-04-03 RX ADMIN — OLANZAPINE 5 MG: 2.5 TABLET, FILM COATED ORAL at 02:46

## 2019-04-03 RX ADMIN — ONDANSETRON 4 MG: 2 INJECTION INTRAMUSCULAR; INTRAVENOUS at 17:36

## 2019-04-03 RX ADMIN — LIDOCAINE HYDROCHLORIDE 100 MG: 20 INJECTION, SOLUTION EPIDURAL; INFILTRATION; INTRACAUDAL; PERINEURAL at 14:42

## 2019-04-03 RX ADMIN — EPHEDRINE SULFATE 10 MG: 50 INJECTION, SOLUTION INTRAVENOUS at 15:28

## 2019-04-03 RX ADMIN — Medication 1 AMPULE: at 09:00

## 2019-04-03 RX ADMIN — ACETAMINOPHEN 1000 MG: 10 INJECTION, SOLUTION INTRAVENOUS at 18:32

## 2019-04-03 RX ADMIN — GLYCOPYRROLATE 0.6 MG: 0.2 INJECTION INTRAMUSCULAR; INTRAVENOUS at 18:34

## 2019-04-03 RX ADMIN — DEXAMETHASONE SODIUM PHOSPHATE 10 MG: 4 INJECTION, SOLUTION INTRA-ARTICULAR; INTRALESIONAL; INTRAMUSCULAR; INTRAVENOUS; SOFT TISSUE at 17:36

## 2019-04-03 RX ADMIN — NEOSTIGMINE METHYLSULFATE 5 MG: 1 INJECTION INTRAVENOUS at 18:34

## 2019-04-03 RX ADMIN — ROCURONIUM BROMIDE 10 MG: 10 INJECTION, SOLUTION INTRAVENOUS at 15:46

## 2019-04-03 RX ADMIN — CLONAZEPAM 4 MG: 1 TABLET ORAL at 21:49

## 2019-04-03 RX ADMIN — EPHEDRINE SULFATE 10 MG: 50 INJECTION, SOLUTION INTRAVENOUS at 16:40

## 2019-04-03 RX ADMIN — ROCURONIUM BROMIDE 30 MG: 10 INJECTION, SOLUTION INTRAVENOUS at 15:00

## 2019-04-03 RX ADMIN — ROCURONIUM BROMIDE 10 MG: 10 INJECTION, SOLUTION INTRAVENOUS at 14:42

## 2019-04-03 RX ADMIN — ROCURONIUM BROMIDE 10 MG: 10 INJECTION, SOLUTION INTRAVENOUS at 16:42

## 2019-04-03 RX ADMIN — PROPOFOL 150 MG: 10 INJECTION, EMULSION INTRAVENOUS at 14:42

## 2019-04-03 RX ADMIN — ALBUMIN HUMAN 250 ML: 50 SOLUTION INTRAVENOUS at 16:28

## 2019-04-03 RX ADMIN — GLYCOPYRROLATE 0.2 MG: 0.2 INJECTION INTRAMUSCULAR; INTRAVENOUS at 15:12

## 2019-04-03 NOTE — BRIEF OP NOTE
BRIEF OPERATIVE NOTE Date of Procedure: 4/3/2019 Preoperative Diagnosis: PARITAL SMALL BOWL OBSTRUCTION Postoperative Diagnosis: INTESTINAL ADHESIONS WITH BOWEL OBSTRUCTION Procedure(s): EXPLORATORY LAPAROTOMY, LYSIS OF ADHESIONS, SMALL BOWEL RESECTION X1, ENTEROTOMY REPAIR X3, REPAIR OF CYSTOTOMY Surgeon(s) and Role: Gwen Foley MD - Primary Surgical Assistant: - 
 
Surgical Staff: 
Circ-1: Mahogany Arnett Circ-Relief: Tracie Patten RN Scrub Tech-1: Charisse Almendarez Scrub Tech-2: Tommy Lopez Scrub Tech-Relief: Galdino Recovers; Gina Sport Event Time In Time Out Incision Start 04/03/2019 1503 Incision Close 04/03/2019 1837 Anesthesia: General  
Estimated Blood Loss: 75-100cc Specimens: * No specimens in log * Findings: extremely dense, diffuse adhesions. No focal transition point as SBO has largely resolved, however mid small bowel chronically thickened and dilated c/w chronic at-least-low-grade obstruction. Several enterotomies, as is not uncommon, and cystotomy. Complications: enterotomy, cystotomy Implants: * No implants in log *

## 2019-04-03 NOTE — PERIOP NOTES
TRANSFER - IN REPORT: 
 
Verbal report received from BEATA Sims on [address]  being received from [facility] for routine progression of care Report consisted of patients Situation, Background, Assessment and  
Recommendations(SBAR). Information from the following report(s) SBAR and MAR was reviewed with the receiving nurse. Opportunity for questions and clarification was provided. Assessment completed upon patients arrival to unit and care assumed.

## 2019-04-03 NOTE — PROGRESS NOTES
TRANSFER - OUT REPORT: 
 
Verbal report given to Jyoti Carvajal RN(name) on Rudy Franco  being transferred to Preop(unit) for routine progression of care Report consisted of patients Situation, Background, Assessment and  
Recommendations(SBAR). Information from the following report(s) SBAR and MAR was reviewed with the receiving nurse. Opportunity for questions and clarification was provided.    
 
Patient transported with:

## 2019-04-03 NOTE — ANESTHESIA POSTPROCEDURE EVALUATION
Procedure(s): EXPLORATORY LAPAROTOMY, LYSIS OF ADHESIONS, SMALL BOWEL RESECTION X1, ENTEROTOMY REPAIR X3, REPAIR OF CYSTOTOMY. general 
 
Anesthesia Post Evaluation Multimodal analgesia: multimodal analgesia used between 6 hours prior to anesthesia start to PACU discharge Patient location during evaluation: PACU Patient participation: complete - patient participated Level of consciousness: awake and alert Pain management: adequate Airway patency: patent Anesthetic complications: no 
Cardiovascular status: acceptable Respiratory status: acceptable Hydration status: acceptable Post anesthesia nausea and vomiting:  none Vitals Value Taken Time /66 4/3/2019  7:04 PM  
Temp 37 °C (98.6 °F) 4/3/2019  6:54 PM  
Pulse 69 4/3/2019  7:09 PM  
Resp 16 4/3/2019  7:09 PM  
SpO2 100 % 4/3/2019  7:09 PM

## 2019-04-03 NOTE — PROGRESS NOTES
Pt resting in bed and is alert and oriented x 4. he denies pain and is on room air. RR even and unlabored. IVF infusing. Ostomy in place. Call light in reach and pt instructed to call for assistance if needed. Will monitor.

## 2019-04-03 NOTE — ANESTHESIA PREPROCEDURE EVALUATION
Anesthetic History No history of anesthetic complications Review of Systems / Medical History Patient summary reviewed and pertinent labs reviewed Pulmonary Within defined limits Neuro/Psych Cardiovascular Exercise tolerance: >4 METS 
  
GI/Hepatic/Renal 
Within defined limits Comments: SBO Endo/Other Hypothyroidism: well controlled Arthritis and cancer (rectal) Other Findings Comments: Insomnia Colonic polyps Anxiety Fecal incontinence Physical Exam 
 
Airway Mallampati: II 
TM Distance: 4 - 6 cm Neck ROM: normal range of motion Mouth opening: Normal 
 
 Cardiovascular Regular rate and rhythm,  S1 and S2 normal,  no murmur, click, rub, or gallop Rhythm: regular Rate: normal 
 
 
 
 Dental 
 
Dentition: Caps/crowns Pulmonary Breath sounds clear to auscultation Abdominal 
GI exam deferred Other Findings Anesthetic Plan ASA: 3, emergent Anesthesia type: general 
 
 
 
 
Induction: Intravenous Anesthetic plan and risks discussed with: Patient

## 2019-04-03 NOTE — PROGRESS NOTES
Seen in preop No new c/o. Has noted a protruding suture in lower midline for >1 yr, without infection 
abd is soft, nontender Imp-adhesions with pSBO, now clinically resolved Plan- for lysis of adhesions to prevent future episodes.

## 2019-04-03 NOTE — PERIOP NOTES
TRANSFER - OUT REPORT: 
 
Verbal report given to [facility] RN(name) on Boubacar Mcclelland  being [facility] to [facility] for routine progression of care Report consisted of patients Situation, Background, Assessment and  
Recommendations(SBAR). Information from the following report(s) SBAR, Kardex, OR Summary, Intake/Output, MAR and Cardiac Rhythm NSR was reviewed with the receiving nurse. Lines:  
Peripheral IV 04/02/19 Anterior; Left Forearm (Active) Site Assessment Clean, dry, & intact 4/3/2019  7:42 PM  
Phlebitis Assessment 0 4/3/2019  7:42 PM  
Infiltration Assessment 0 4/3/2019  7:42 PM  
Dressing Status Clean, dry, & intact 4/3/2019  7:42 PM  
Dressing Type Transparent;Tape 4/3/2019  7:42 PM  
Hub Color/Line Status Infusing;Pink 4/3/2019  6:54 PM  
  
 
Opportunity for questions and clarification was provided. Patient transported with: 
 O2 @ 4 liters

## 2019-04-04 PROCEDURE — 74011250636 HC RX REV CODE- 250/636: Performed by: SURGERY

## 2019-04-04 PROCEDURE — 65270000029 HC RM PRIVATE

## 2019-04-04 PROCEDURE — 74011250636 HC RX REV CODE- 250/636: Performed by: PHYSICIAN ASSISTANT

## 2019-04-04 PROCEDURE — 77030020255 HC SOL INJ LR 1000ML BG

## 2019-04-04 PROCEDURE — 77010033678 HC OXYGEN DAILY

## 2019-04-04 PROCEDURE — 74011250637 HC RX REV CODE- 250/637: Performed by: SURGERY

## 2019-04-04 PROCEDURE — 94760 N-INVAS EAR/PLS OXIMETRY 1: CPT

## 2019-04-04 PROCEDURE — 74011000258 HC RX REV CODE- 258: Performed by: SURGERY

## 2019-04-04 PROCEDURE — 77030020256 HC SOL INJ NACL 0.9%  500ML

## 2019-04-04 RX ORDER — ACETAMINOPHEN 10 MG/ML
1000 INJECTION, SOLUTION INTRAVENOUS EVERY 6 HOURS
Status: COMPLETED | OUTPATIENT
Start: 2019-04-04 | End: 2019-04-05

## 2019-04-04 RX ORDER — SODIUM CHLORIDE 9 MG/ML
500 INJECTION, SOLUTION INTRAVENOUS ONCE
Status: COMPLETED | OUTPATIENT
Start: 2019-04-04 | End: 2019-04-04

## 2019-04-04 RX ORDER — ACETAMINOPHEN 325 MG/1
650 TABLET ORAL
Status: DISCONTINUED | OUTPATIENT
Start: 2019-04-04 | End: 2019-05-03 | Stop reason: HOSPADM

## 2019-04-04 RX ORDER — HYDROMORPHONE HYDROCHLORIDE 2 MG/ML
.5-2 INJECTION, SOLUTION INTRAMUSCULAR; INTRAVENOUS; SUBCUTANEOUS
Status: DISCONTINUED | OUTPATIENT
Start: 2019-04-04 | End: 2019-05-03 | Stop reason: HOSPADM

## 2019-04-04 RX ORDER — KETOROLAC TROMETHAMINE 15 MG/ML
15 INJECTION, SOLUTION INTRAMUSCULAR; INTRAVENOUS EVERY 6 HOURS
Status: DISCONTINUED | OUTPATIENT
Start: 2019-04-04 | End: 2019-04-05

## 2019-04-04 RX ADMIN — OLANZAPINE 5 MG: 2.5 TABLET, FILM COATED ORAL at 02:39

## 2019-04-04 RX ADMIN — CLONAZEPAM 2 MG: 1 TABLET ORAL at 02:40

## 2019-04-04 RX ADMIN — Medication 1 AMPULE: at 22:08

## 2019-04-04 RX ADMIN — KETOROLAC TROMETHAMINE 15 MG: 15 INJECTION, SOLUTION INTRAMUSCULAR; INTRAVENOUS at 16:32

## 2019-04-04 RX ADMIN — DEXTROSE MONOHYDRATE, SODIUM CHLORIDE, AND POTASSIUM CHLORIDE 150 ML/HR: 50; 4.5; 1.49 INJECTION, SOLUTION INTRAVENOUS at 20:53

## 2019-04-04 RX ADMIN — CLONAZEPAM 4 MG: 1 TABLET ORAL at 22:08

## 2019-04-04 RX ADMIN — AMITRIPTYLINE HYDROCHLORIDE 100 MG: 50 TABLET, FILM COATED ORAL at 22:08

## 2019-04-04 RX ADMIN — CEFOXITIN SODIUM 2 G: 2 POWDER, FOR SOLUTION INTRAVENOUS at 10:53

## 2019-04-04 RX ADMIN — LEVOTHYROXINE SODIUM 125 MCG: 125 TABLET ORAL at 06:35

## 2019-04-04 RX ADMIN — ACETAMINOPHEN 1000 MG: 10 INJECTION, SOLUTION INTRAVENOUS at 18:01

## 2019-04-04 RX ADMIN — OLANZAPINE 2.5 MG: 2.5 TABLET, FILM COATED ORAL at 22:09

## 2019-04-04 RX ADMIN — Medication 1 AMPULE: at 09:37

## 2019-04-04 RX ADMIN — DEXTROSE MONOHYDRATE, SODIUM CHLORIDE, AND POTASSIUM CHLORIDE 100 ML/HR: 50; 4.5; 1.49 INJECTION, SOLUTION INTRAVENOUS at 11:30

## 2019-04-04 RX ADMIN — KETOROLAC TROMETHAMINE 15 MG: 15 INJECTION, SOLUTION INTRAMUSCULAR; INTRAVENOUS at 23:45

## 2019-04-04 RX ADMIN — SODIUM CHLORIDE, SODIUM LACTATE, POTASSIUM CHLORIDE, AND CALCIUM CHLORIDE 1000 ML: 600; 310; 30; 20 INJECTION, SOLUTION INTRAVENOUS at 15:20

## 2019-04-04 RX ADMIN — ACETAMINOPHEN 1000 MG: 10 INJECTION, SOLUTION INTRAVENOUS at 23:46

## 2019-04-04 RX ADMIN — SODIUM CHLORIDE 500 ML: 900 INJECTION, SOLUTION INTRAVENOUS at 10:52

## 2019-04-04 RX ADMIN — OXYCODONE AND ACETAMINOPHEN 1 TABLET: 5; 325 TABLET ORAL at 10:57

## 2019-04-04 RX ADMIN — SODIUM CHLORIDE 500 ML: 900 INJECTION, SOLUTION INTRAVENOUS at 08:27

## 2019-04-04 RX ADMIN — CEFOXITIN SODIUM 2 G: 2 POWDER, FOR SOLUTION INTRAVENOUS at 22:52

## 2019-04-04 RX ADMIN — CEFOXITIN SODIUM 2 G: 2 POWDER, FOR SOLUTION INTRAVENOUS at 05:13

## 2019-04-04 RX ADMIN — CEFOXITIN SODIUM 2 G: 2 POWDER, FOR SOLUTION INTRAVENOUS at 16:40

## 2019-04-04 NOTE — PROGRESS NOTES
Care assumed, assess complete. Pt with no complaints at this time; sleeping between care and appears comfortable. BP has been running low side, 85/45 manually with fluids running.  
Will notify MD.

## 2019-04-04 NOTE — PROGRESS NOTES
BP improved after additional liter bolus NS. Pt naye well. Per daryl Mendosa to remain in until he discontinues.

## 2019-04-04 NOTE — PROGRESS NOTES
Surgical findings/events reviewed Pt c/o soreness, severe Visit Vitals /70 (BP 1 Location: Right arm, BP Patient Position: At rest) Pulse 91 Temp 98.8 °F (37.1 °C) Resp 18 Ht 5' 8\" (1.727 m) Wt 140 lb (63.5 kg) SpO2 98% BMI 21.29 kg/m² Date 04/03/19 0700 - 04/04/19 5711 04/04/19 0700 - 04/05/19 1799 Shift 2006-0886 9190-6153 24 Hour Total 9179-7271 9172-8388 24 Hour Total  
INTAKE  
P.O.    60  60  
  P. O.    60  60  
I. V.(mL/kg/hr) 0887(7.6)  2350(1.5) 2350  2350 Volume (lactated Ringers infusion) 2250  2250 Volume (dextrose 5% - 0.45% NaCl with KCl 20 mEq/L infusion)    1300  1300 Volume (sodium chloride 0.9 % bolus infusion 500 mL)    500  500 Volume (0.9% sodium chloride infusion 500 mL)    500  500 Volume (cefOXitin (MEFOXIN) 2 g in 0.9% sodium chloride (MBP/ADV) 50 mL)    50  50 Volume (acetaminophen (OFIRMEV) infusion) 100  100 Shift Total(mL/kg) [id number])  6199(72) 6036(27)  4852(09)  
OUTPUT Urine(mL/kg/hr) 900(1.2) 780(1) 1680(1.1) Urine Output 900  900 Urine Output (mL) (Urinary Catheter 04/03/19 2- way; Bishop)  780 780 Emesis/NG output  100 100 0  0 Output (ml) (Nasogastric Tube 04/03/19)  100 100 0  0 Blood 200  200 Estimated Blood Loss 200  200 Shift Total(mL/kg) 1100(17.3) 350(32.5) 9283(78.1) 0(0)  0(0) NET 1250 -  2410 Weight (kg) 63.5 63.5 63.5 63.5 63.5 63.5 Lungs-limited effort, but clear CV-reg 
abd-dressing dry Plan- 
Bolus fluids- clnically dry Add toradol/tylenol for pain control in light of borderline hypotension Check labs in a.m. Bishop to stay in several days due to cystotomy.

## 2019-04-04 NOTE — PROGRESS NOTES
TRANSFER - IN REPORT: 
 
Verbal report received from Sky Eastman RN on [address]  being received from PACU (unit) for routine post - op Report consisted of patients Situation, Background, Assessment and  
Recommendations(SBAR). Information from the following report(s) OR Summary, Procedure Summary, Intake/Output and MAR was reviewed with the receiving nurse. Opportunity for questions and clarification was provided. Assessment completed upon patients arrival to unit and care assumed.

## 2019-04-04 NOTE — PROGRESS NOTES
Nutrition Reason for assessment: Length of stay Assessment:  
Diet: DIET NPO With Osorio Food/Nutrition Patient History:  Pt presented with 1 week history of abdominal bloating and pain, decreased colostomy output. Past medical history notable for rectal cancer, ileostomy, colostomy in 2014. Pt currently sleeping and did not disturb. No nutrition risk factors identified on nursing admission malnutrition screening too. Pt currently s/p 1 day surgery/lysis of adhesions, small bowel resection. Anthropometrics:Height: 5' 8\" (172.7 cm),  Weight: 63.5 kg (140 lb), Weight Source: Patient stated, Body mass index is 21.29 kg/m². BMI class of underweight for Older American Male. BMI recommended (23-30) Weight history per EMR:  Unable to determine if weights are actual vs stated due to functionality of [facility]. WT / BMI WEIGHT BODY MASS INDEX  
3/30/2019 140 lb 21.29 kg/m2 3/24/2019 137 lb 20.83 kg/m2 3/20/2019 140 lb 21.29 kg/m2  
12/14/2018 142 lb 20.37 kg/m2 9/27/2018 139 lb 19.94 kg/m2 3/28/2018 134 lb 6.4 oz 19.28 kg/m2 3/19/2018 140 lb 20.09 kg/m2 Macronutrient needs: EER:  6476-2749 kcal /day (30-35 kcal/kg BW) EPR:  76-89 grams protein/day (1.2-1.4 grams/kg BW) GFR >60 Intake/Comparative Standards: NPO; pt does not meet estimated kcal or protein needs r/t NPO status. Current IVF provides 612 non protein kcal/day. Nutrition Diagnosis: Inadequate oral intake r/t alteration in GI tract as evidenced by pt s/p day 1 surgery-lysis of adhesions, SB resection, NPO status. Intervention: 
Meals andsnacks:  NPO It is reasonable to wait 7-10 days before initiating TPN in a patient with no or low nutrition risk. Expect po diet will be able to be started within this timeframe. Will await start or progression of po diet as medical condition dictates. Otherwise, F/U will be per standard of care or by MD consult if prior. Discharge nutrition plan: Too soon to determine. Sai Gunter, MPH,  [address] [place] [address],  
[phone]

## 2019-04-05 LAB
ANION GAP SERPL CALC-SCNC: 4 MMOL/L
BUN SERPL-MCNC: 20 MG/DL (ref 8–23)
CALCIUM SERPL-MCNC: 7.7 MG/DL (ref 8.3–10.4)
CHLORIDE SERPL-SCNC: 101 MMOL/L (ref 98–107)
CO2 SERPL-SCNC: 30 MMOL/L (ref 21–32)
CREAT SERPL-MCNC: 2.2 MG/DL (ref 0.8–1.5)
ERYTHROCYTE [DISTWIDTH] IN BLOOD BY AUTOMATED COUNT: 13 % (ref 11.9–14.6)
GLUCOSE SERPL-MCNC: 91 MG/DL (ref 65–100)
HCT VFR BLD AUTO: 31.2 % (ref 41.1–50.3)
HGB BLD-MCNC: 10 G/DL (ref 13.6–17.2)
MCH RBC QN AUTO: 28.8 PG (ref 26.1–32.9)
MCHC RBC AUTO-ENTMCNC: 32.1 G/DL (ref 31.4–35)
MCV RBC AUTO: 89.9 FL (ref 79.6–97.8)
NRBC # BLD: 0 K/UL (ref 0–0.2)
PLATELET # BLD AUTO: 157 K/UL (ref 150–450)
PMV BLD AUTO: 10 FL (ref 9.4–12.3)
POTASSIUM SERPL-SCNC: 4.9 MMOL/L (ref 3.5–5.1)
RBC # BLD AUTO: 3.47 M/UL (ref 4.23–5.6)
SODIUM SERPL-SCNC: 135 MMOL/L (ref 136–145)
WBC # BLD AUTO: 8.7 K/UL (ref 4.3–11.1)

## 2019-04-05 PROCEDURE — 80048 BASIC METABOLIC PNL TOTAL CA: CPT

## 2019-04-05 PROCEDURE — 77030020256 HC SOL INJ NACL 0.9%  500ML

## 2019-04-05 PROCEDURE — 65270000029 HC RM PRIVATE

## 2019-04-05 PROCEDURE — 77030027138 HC INCENT SPIROMETER -A

## 2019-04-05 PROCEDURE — 74011250637 HC RX REV CODE- 250/637: Performed by: SURGERY

## 2019-04-05 PROCEDURE — 74011000258 HC RX REV CODE- 258: Performed by: SURGERY

## 2019-04-05 PROCEDURE — 74011250636 HC RX REV CODE- 250/636: Performed by: SURGERY

## 2019-04-05 PROCEDURE — 85027 COMPLETE CBC AUTOMATED: CPT

## 2019-04-05 PROCEDURE — 36415 COLL VENOUS BLD VENIPUNCTURE: CPT

## 2019-04-05 RX ADMIN — LEVOTHYROXINE SODIUM 125 MCG: 125 TABLET ORAL at 06:06

## 2019-04-05 RX ADMIN — CLONAZEPAM 2 MG: 1 TABLET ORAL at 02:24

## 2019-04-05 RX ADMIN — OLANZAPINE 2.5 MG: 2.5 TABLET, FILM COATED ORAL at 22:43

## 2019-04-05 RX ADMIN — AMITRIPTYLINE HYDROCHLORIDE 100 MG: 50 TABLET, FILM COATED ORAL at 22:39

## 2019-04-05 RX ADMIN — DEXTROSE MONOHYDRATE, SODIUM CHLORIDE, AND POTASSIUM CHLORIDE 150 ML/HR: 50; 4.5; 1.49 INJECTION, SOLUTION INTRAVENOUS at 10:51

## 2019-04-05 RX ADMIN — CEFOXITIN SODIUM: 1 POWDER, FOR SOLUTION INTRAVENOUS at 17:22

## 2019-04-05 RX ADMIN — Medication 1 AMPULE: at 10:04

## 2019-04-05 RX ADMIN — OLANZAPINE 5 MG: 2.5 TABLET, FILM COATED ORAL at 02:24

## 2019-04-05 RX ADMIN — ACETAMINOPHEN 1000 MG: 10 INJECTION, SOLUTION INTRAVENOUS at 06:03

## 2019-04-05 RX ADMIN — Medication 1 AMPULE: at 20:33

## 2019-04-05 RX ADMIN — CLONAZEPAM 4 MG: 1 TABLET ORAL at 22:42

## 2019-04-05 RX ADMIN — DEXTROSE MONOHYDRATE, SODIUM CHLORIDE, AND POTASSIUM CHLORIDE 150 ML/HR: 50; 4.5; 1.49 INJECTION, SOLUTION INTRAVENOUS at 04:16

## 2019-04-05 RX ADMIN — ACETAMINOPHEN 1000 MG: 10 INJECTION, SOLUTION INTRAVENOUS at 11:40

## 2019-04-05 RX ADMIN — DEXTROSE MONOHYDRATE, SODIUM CHLORIDE, AND POTASSIUM CHLORIDE 150 ML/HR: 50; 4.5; 1.49 INJECTION, SOLUTION INTRAVENOUS at 20:32

## 2019-04-05 RX ADMIN — CEFOXITIN SODIUM 2 G: 2 POWDER, FOR SOLUTION INTRAVENOUS at 05:25

## 2019-04-05 RX ADMIN — KETOROLAC TROMETHAMINE 15 MG: 15 INJECTION, SOLUTION INTRAMUSCULAR; INTRAVENOUS at 06:03

## 2019-04-05 RX ADMIN — SODIUM CHLORIDE 500 ML: 900 INJECTION, SOLUTION INTRAVENOUS at 10:56

## 2019-04-05 NOTE — OP NOTES
Operative Report    Patient: Mindy Lunsford MRN: [id number]     YOB: 1945  Age: 74 y.o. Sex: male       Date of Surgery: 4/3/2019     Preoperative Diagnosis: PARITAL SMALL BOWL OBSTRUCTION     Postoperative Diagnosis: INTESTINAL ADHESIONS WITH BOWEL OBSTRUCTION     NAME OF PROCEDURE:  Procedure(s):  EXPLORATORY LAPAROTOMY, LYSIS OF ADHESIONS, SMALL BOWEL RESECTION X1, ENTEROTOMY REPAIR X3, REPAIR OF CYSTOTOMY. SURGEON: Anna Rodrigez MD    Anesthesia: General     Complications: none      Procedure Details       Informed consent was obtained and the patient was brought to the operating room and placed supine. After induction of a general anesthetic, a Bishop catheter was inserted, the stoma was isolated from the wound with an impermeable dressing,  and the abdomen was prepped and draped in standard fashion. A short lower abdominal site of scar was chosen for initial entry and incision was made and cautery was used to dissect to the fascia. Scalpel was used to carefully incise the fascia and thick underlying scar tissue until serosa of bowel was encountered. Scissors were then used to free this from the abdominal wall and gain entry into the peritoneal cavity. There were severe adhesions to the midline and very tedious, careful sharp dissectiona nd blunt finger-sweep dissection was used to creat a free space in the lower abdomen. At the lower pole of the incision, a cavity was entered, initially felt to be small bowel but was actually the bladder. This was temporarily oversewn to prevent spillage. Three-plus hours of careful, tedious, lysis of adhesions was then undertaken to free all of the small bowel completely from the anterior abdominal wall, retroperitoneum, and from interloop adhesions. This continued from the mid-small bowel in an antegrade and retrograde fashion until both the ligament of Treitz and the ileo-left-colostomy was encountered.  The midline fascia was progressively opened as space was cleared of adhesions, until essentially his entire old scar was opened. Numerous old prolene sutures were removed. The short segment of remaining colon and colostomy were not approached. Note was made of unusually thick and hard adhesions of small bowel to the central abdominal midline, which may be the result of a previously placed biologic mesh. As is not uncommon in this difficult cases, several enterotomies were created. One enterotomy was >75% of the bowel circumference and this site was converted into an end to side anastomosis with a SOFÍA stapling device, closed with a TA stapling device. Three additional small enterotomies were oversewn with 2 layers of interrupted silk sutures. A focal mesenteric defect was created during mobilization with note made of a very short segment of bowel that was observed to become progressively more dusky and was concerning for ischemia which could progress to necrosis or, if it recovered, a stricture. A few centimeters distal to this were 2 small enterotomies. These sites were excised a a single short loop while creating a side-to-side anastomosis with a SOFÍA stapler and the transection to complete the double-staple technique done with another SOFÍA blue load and the mesenteric defect was closed with 3-0 silk. The abdomen was irrigated copiously and confirmed to be hemostatic in dissected areas other than the typical post-lysis hemorrhagic oozing. Several attempts were made by anesthesia to pass an NGT but it could not be passed into the stomach for manual positioning. The bladder was next addressed. The cystotomy was approximately 3 centimeters long. This was closed with a running 3-0 Vicryl mucosal layer followed by a running 2-0 Vicryl muscular layer closure after confirming visualization of the catheter balloon within the bladder.     After further irrigation, the bowel was run twice from ligament of Treitz to ileocolonic anastomosis, confirming integrity of staple lines and enterotomy repairs and integrity of the remaining bowel serosa. Two full procedure packs of Seprafilm were placed throughout the abdomen, including deep in the pelvis, along the retroperitoneum and both abdominal sidewalls, along the mesentery itself between its folds, and on top of the intestine after it was returned to the abdominal cavity. The midline fascia was felt to be of adequate strength for primary repair. The incision was closed with running #1 prolene suture. And the skin closed at wide intervals with staples. The patient tolerated the procedure well with no apparent complications and was awakened from anesthesia and extubated in the operating room and taken to the recovery room in satisfactory condition. Sponge and needle counts were correct times two as reported to me.     Estimated Blood Loss: per anesthesia           Specimens: * No specimens in log *        Signed By:  Comfort Viera MD     April 5, 2019

## 2019-04-05 NOTE — PROGRESS NOTES
Pt has surgery yesterday. Per chart pt is ambulating in the halls. Pt with colostomy. SW to follow for dc needs. Paola Mccullough

## 2019-04-05 NOTE — PROGRESS NOTES
Surgical findings/events reviewed Pt c/o discomfort from NG tube - 1000mL documented output overnight with an additional  500mL out today. Visit Vitals /71 (BP 1 Location: Left arm, BP Patient Position: At rest;Supine) Pulse 90 Temp 98 °F (36.7 °C) Resp 18 Ht 5' 8\" (1.727 m) Wt 140 lb (63.5 kg) SpO2 94% BMI 21.29 kg/m² Date 04/04/19 0700 - 04/05/19 2771 04/05/19 0700 - 04/06/19 1007 Shift 3044-2393 4200-9959 24 Hour Total 7763-4426 5454-4489 24 Hour Total  
INTAKE  
P.O. 120  120     
  P. O. 120  120     
I. V.(mL/kg/hr) 4008(5.3) 1429(1.9) 5437(3.6) 600  600 I.V.  4985 1317 Volume (dextrose 5% - 0.45% NaCl with KCl 20 mEq/L infusion) 1808  1808 Volume (sodium chloride 0.9 % bolus infusion 500 mL) 500  500 Volume (0.9% sodium chloride infusion 500 mL) 500  500 Volume (lactated ringers bolus infusion 1,000 mL) 1000  1000 Volume (sodium chloride 0.9 % bolus infusion 500 mL)    500  500 Volume (cefOXitin (MEFOXIN) 2 g in 0.9% sodium chloride (MBP/ADV) 50 mL) 100  100 Volume (acetaminophen (OFIRMEV) infusion 1,000 mg) 100  100 100  100 Shift Total(mL/kg) [id number]) 0661(45.1) 6322(11.9) 600(9.4)  600(9.4) OUTPUT Urine(mL/kg/hr) 425(0.6) 450(0.6) 875(0.6) 350  350 Urine Output (mL) (Urinary Catheter 04/03/19 2- way; Bishop) 425 450 875 350  350 Emesis/NG output 0 1000 1000 500  500 Output (ml) (Nasogastric Tube 04/03/19) 0 1000 1000 500  500 Shift Total(mL/kg) 425(6.7) [id number]) 1875(29.5) [id number])  850(13.4) NET 3006 -21 3682 -250  -250 Weight (kg) 63.5 63.5 63.5 63.5 63.5 63.5 Lungs-limited effort, but clear CV-reg 
abd-dressing changed. No erythema. Colostomy with small amount brown liquid in bag. BS active. Bishop - 875mL 24hr urine output Plan- 
IVF - Creat 2.20 Toradol/tylenol for pain control in light of borderline hypotension Follow labs Bishop to stay in several days due to cystotomy. Christina Bending, NP

## 2019-04-05 NOTE — PROGRESS NOTES
PM assessment complete. Alert, oriented x 4. Respirations even and unlabored, no distress noted. IVF infusing without difficulty. NGT in place to LIS. Abdomen soft, tender, bowel sounds hypoactive in all 4 quadrants. Colostomy present, bag intact. Dressing to midline abdominal incision clean, dry and intact. Bishop patent and draining. Patient denies needs at this time. Will continue to monitor with hourly rounding.

## 2019-04-05 NOTE — PROGRESS NOTES
Pt with no new complaints. Pain has been well managed. Bilious output from NG, no nausea or distension. Ambulating in halls with assist today.

## 2019-04-05 NOTE — PROGRESS NOTES
Problem: Falls - Risk of 
Goal: *Absence of Falls Description Document Mookie Ledesma Fall Risk and appropriate interventions in the flowsheet. 4/4/2019 2144 by Rekha Jean Baptiste RN Outcome: Progressing Towards Goal

## 2019-04-05 NOTE — DISCHARGE SUMMARY
Physician Discharge Summary     Patient ID:  Mignon Mojica  [id number]  74 y.o.  1945    Allergies: Patient has no known allergies. Admit Date: 3/22/2019    Discharge Date: 3/26/19    * Admission Diagnoses: SBO (small bowel obstruction) ([facility] [I35.682]    * Discharge Diagnoses:    Hospital Problems as of 3/26/2019 Date Reviewed: 12/17/2018          Codes Class Noted - Resolved POA    * (Principal) SBO (small bowel obstruction) ([facility] ICD-10-CM: H72.176  ICD-9-CM: 560.9  3/22/2019 - Present Unknown               Admission Condition: Fair    * Discharge Condition: improved    * Procedures: * No surgery found 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 Indications: TAKES 3 PILLS qHS, Historical Med      levothyroxine (SYNTHROID) 125 mcg tablet TAKE ONE TABLET BY MOUTH ONE TIME DAILY BEFORE BREAKFAST, Normal, Disp-90 Tab, R-3      Cholecalciferol, Vitamin D3, (VITAMIN D3) 2,000 unit cap capsule Take 2,000 Units by mouth two (2) times a day., Normal, Disp-180 Cap, R-3      clonazePAM (KLONOPIN) 2 mg tablet Take 2 Tabs by mouth nightly. Max Daily Amount: 4 mg. Indications: takes for sleep, Print, Disp-30 Tab, R-0      amitriptyline (ELAVIL) 100 mg tablet Take 100 mg by mouth nightly. Dr Kent Dys psych  Indications: takes for sleep, Historical Med             * Follow-up Care/Patient Instructions:   Activity: Activity as tolerated  Diet: Regular Diet  Wound Care: None needed    Follow-up Information     Follow up With Specialties Details Why Contact Luis Barron, DO Internal Medicine   [address] [facility] [address]  [phone] Follow-up tests/labs n/a    Signed:  Edilma Seals MD  4/5/2019  12:49 PM

## 2019-04-06 LAB
ANION GAP SERPL CALC-SCNC: 5 MMOL/L
BUN SERPL-MCNC: 14 MG/DL (ref 8–23)
CALCIUM SERPL-MCNC: 7.6 MG/DL (ref 8.3–10.4)
CHLORIDE SERPL-SCNC: 98 MMOL/L (ref 98–107)
CO2 SERPL-SCNC: 31 MMOL/L (ref 21–32)
CREAT SERPL-MCNC: 1.21 MG/DL (ref 0.8–1.5)
GLUCOSE SERPL-MCNC: 96 MG/DL (ref 65–100)
POTASSIUM SERPL-SCNC: 3.7 MMOL/L (ref 3.5–5.1)
SODIUM SERPL-SCNC: 134 MMOL/L (ref 136–145)

## 2019-04-06 PROCEDURE — 74011250636 HC RX REV CODE- 250/636: Performed by: SURGERY

## 2019-04-06 PROCEDURE — 74011250637 HC RX REV CODE- 250/637: Performed by: SURGERY

## 2019-04-06 PROCEDURE — 80048 BASIC METABOLIC PNL TOTAL CA: CPT

## 2019-04-06 PROCEDURE — 65270000029 HC RM PRIVATE

## 2019-04-06 PROCEDURE — 74011000258 HC RX REV CODE- 258: Performed by: SURGERY

## 2019-04-06 PROCEDURE — 36415 COLL VENOUS BLD VENIPUNCTURE: CPT

## 2019-04-06 PROCEDURE — 77030020255 HC SOL INJ LR 1000ML BG

## 2019-04-06 PROCEDURE — 94762 N-INVAS EAR/PLS OXIMTRY CONT: CPT

## 2019-04-06 RX ORDER — DIPHENHYDRAMINE HYDROCHLORIDE 50 MG/ML
25 INJECTION, SOLUTION INTRAMUSCULAR; INTRAVENOUS
Status: DISCONTINUED | OUTPATIENT
Start: 2019-04-06 | End: 2019-04-08

## 2019-04-06 RX ADMIN — Medication 1 AMPULE: at 08:27

## 2019-04-06 RX ADMIN — CEFOXITIN SODIUM 2 G: 1 POWDER, FOR SOLUTION INTRAVENOUS at 14:45

## 2019-04-06 RX ADMIN — VITAMIN D, TAB 1000IU (100/BT) 2000 UNITS: 25 TAB at 17:44

## 2019-04-06 RX ADMIN — VITAMIN D, TAB 1000IU (100/BT) 2000 UNITS: 25 TAB at 08:27

## 2019-04-06 RX ADMIN — HYDROMORPHONE HYDROCHLORIDE 2 MG: 2 INJECTION INTRAMUSCULAR; INTRAVENOUS; SUBCUTANEOUS at 10:14

## 2019-04-06 RX ADMIN — Medication 1 AMPULE: at 20:50

## 2019-04-06 RX ADMIN — CLONAZEPAM 2 MG: 1 TABLET ORAL at 02:57

## 2019-04-06 RX ADMIN — AMITRIPTYLINE HYDROCHLORIDE 100 MG: 50 TABLET, FILM COATED ORAL at 22:22

## 2019-04-06 RX ADMIN — OLANZAPINE 5 MG: 2.5 TABLET, FILM COATED ORAL at 03:01

## 2019-04-06 RX ADMIN — PIPERACILLIN AND TAZOBACTAM 3.38 G: 3; .375 INJECTION, POWDER, FOR SOLUTION INTRAVENOUS at 22:30

## 2019-04-06 RX ADMIN — DEXTROSE MONOHYDRATE, SODIUM CHLORIDE, AND POTASSIUM CHLORIDE 150 ML/HR: 50; 4.5; 1.49 INJECTION, SOLUTION INTRAVENOUS at 03:04

## 2019-04-06 RX ADMIN — HYDROMORPHONE HYDROCHLORIDE 0.5 MG: 2 INJECTION INTRAMUSCULAR; INTRAVENOUS; SUBCUTANEOUS at 20:49

## 2019-04-06 RX ADMIN — DEXTROSE MONOHYDRATE, SODIUM CHLORIDE, AND POTASSIUM CHLORIDE 150 ML/HR: 50; 4.5; 1.49 INJECTION, SOLUTION INTRAVENOUS at 10:14

## 2019-04-06 RX ADMIN — CLONAZEPAM 4 MG: 1 TABLET ORAL at 22:22

## 2019-04-06 RX ADMIN — LEVOTHYROXINE SODIUM 125 MCG: 125 TABLET ORAL at 07:06

## 2019-04-06 RX ADMIN — SODIUM CHLORIDE, SODIUM LACTATE, POTASSIUM CHLORIDE, AND CALCIUM CHLORIDE 500 ML: 600; 310; 30; 20 INJECTION, SOLUTION INTRAVENOUS at 14:33

## 2019-04-06 RX ADMIN — HYDROMORPHONE HYDROCHLORIDE 1 MG: 2 INJECTION INTRAMUSCULAR; INTRAVENOUS; SUBCUTANEOUS at 16:20

## 2019-04-06 RX ADMIN — DEXTROSE MONOHYDRATE, SODIUM CHLORIDE, AND POTASSIUM CHLORIDE 150 ML/HR: 50; 4.5; 1.49 INJECTION, SOLUTION INTRAVENOUS at 22:35

## 2019-04-06 RX ADMIN — DEXTROSE MONOHYDRATE, SODIUM CHLORIDE, AND POTASSIUM CHLORIDE 150 ML/HR: 50; 4.5; 1.49 INJECTION, SOLUTION INTRAVENOUS at 16:21

## 2019-04-06 RX ADMIN — OLANZAPINE 2.5 MG: 2.5 TABLET, FILM COATED ORAL at 22:23

## 2019-04-06 RX ADMIN — CEFOXITIN SODIUM 2 G: 2 POWDER, FOR SOLUTION INTRAVENOUS at 05:29

## 2019-04-06 NOTE — PROGRESS NOTES
Oral temperature 100.6. Pt does not meet parameters for PRN Tylenol. Encouraged pt use incentive spirometer. Pt verbalized understanding and provided return demonstration. Will continue to monitor.

## 2019-04-06 NOTE — PROGRESS NOTES
Shift assessment complete. Pt alert and oriented x4. Respirations even and unlabored. Lung sounds diminished on room air. HR regular. Abdomen tender, hypoactive bowel sounds heard in all four quadrants. Midline incision c-d-I. Ostomy red, moist, with loose stool. NGT in place per MD order, clamped for medication administration. NGT canister at 550 mL dark green output. IV patent and infusing. Bishop catheter also in place, draining luigi urine. Bilateral SCDs in place. Safety measures in place, call light within reach. Will continue to monitor.

## 2019-04-06 NOTE — PROGRESS NOTES
Resting quietly, awake, resp even, unlab, skin warm, dry. Abdom incision intact with staples, dsg clean, dry, intact. Ostomy with red, moist stoma, bag intact with small amount of liquid brown stool. NG intact to left nare to LIS, output greenish brown. No c/o during bedside report received from Chika Pak RN. No distress.

## 2019-04-06 NOTE — PROGRESS NOTES
Pt complains of abdominal pain 8/10. PRN Dilaudid 1 mg given slow, IVP per MD order. Continuous pulse oximetry monitor on per protocol. Safety measures in place, call light within reach. Will continue to monitor.

## 2019-04-06 NOTE — PROGRESS NOTES
POD 3 No new issues- sore throat. Some burping, some cramps, no flatus per ostomy Oral membranes dry. UOP good but still concentrated 
abd-incision good Stoma pink Rare bowel sound Incision ok, minimal tenderness Plan- 
Keep NGT Keep brito x 1 week Small LR bolus

## 2019-04-06 NOTE — PROGRESS NOTES
Pt with reported fever of 100.6 at 0317. Incentive spirometer given with pt instructed to use, rationale given, followed with deep breathing. Reassessed temp 100.1. Reminded to use I/S at frequent intervals.

## 2019-04-06 NOTE — PROGRESS NOTES
Pt complains of abdominal pain 10/10. PRN Dilaudid 2 mg given per MD order slow, IVP. Continuous pulse oximetry monitor on per protocol. Safety measures in place, call light within reach. Will continue to monitor.

## 2019-04-06 NOTE — PROGRESS NOTES
04/06/19 1010 Oxygen Therapy O2 Sat (%) 93 % Pulse via Oximetry 87 beats per minute O2 Device Room air

## 2019-04-06 NOTE — PROGRESS NOTES
Pt ambulating in hallway with PCTGee. NGT canister at 800 mL green output, canister changed. Will continue to monitor.

## 2019-04-07 PROCEDURE — 94762 N-INVAS EAR/PLS OXIMTRY CONT: CPT

## 2019-04-07 PROCEDURE — 74011000258 HC RX REV CODE- 258: Performed by: SURGERY

## 2019-04-07 PROCEDURE — 74011250637 HC RX REV CODE- 250/637: Performed by: SURGERY

## 2019-04-07 PROCEDURE — 74011250636 HC RX REV CODE- 250/636: Performed by: SURGERY

## 2019-04-07 PROCEDURE — 65270000029 HC RM PRIVATE

## 2019-04-07 PROCEDURE — 77010033678 HC OXYGEN DAILY

## 2019-04-07 RX ADMIN — Medication 1 AMPULE: at 21:19

## 2019-04-07 RX ADMIN — DEXTROSE MONOHYDRATE, SODIUM CHLORIDE, AND POTASSIUM CHLORIDE 150 ML/HR: 50; 4.5; 1.49 INJECTION, SOLUTION INTRAVENOUS at 14:52

## 2019-04-07 RX ADMIN — Medication 1 AMPULE: at 09:46

## 2019-04-07 RX ADMIN — CLONAZEPAM 2 MG: 1 TABLET ORAL at 03:46

## 2019-04-07 RX ADMIN — VITAMIN D, TAB 1000IU (100/BT) 2000 UNITS: 25 TAB at 18:39

## 2019-04-07 RX ADMIN — PIPERACILLIN AND TAZOBACTAM 3.38 G: 3; .375 INJECTION, POWDER, FOR SOLUTION INTRAVENOUS at 15:02

## 2019-04-07 RX ADMIN — HYDROMORPHONE HYDROCHLORIDE 0.5 MG: 2 INJECTION INTRAMUSCULAR; INTRAVENOUS; SUBCUTANEOUS at 05:40

## 2019-04-07 RX ADMIN — VITAMIN D, TAB 1000IU (100/BT) 2000 UNITS: 25 TAB at 09:46

## 2019-04-07 RX ADMIN — HYDROMORPHONE HYDROCHLORIDE 0.5 MG: 2 INJECTION INTRAMUSCULAR; INTRAVENOUS; SUBCUTANEOUS at 01:04

## 2019-04-07 RX ADMIN — HYDROMORPHONE HYDROCHLORIDE 0.5 MG: 2 INJECTION INTRAMUSCULAR; INTRAVENOUS; SUBCUTANEOUS at 18:44

## 2019-04-07 RX ADMIN — DIPHENHYDRAMINE HYDROCHLORIDE 25 MG: 50 INJECTION, SOLUTION INTRAMUSCULAR; INTRAVENOUS at 23:03

## 2019-04-07 RX ADMIN — OLANZAPINE 2.5 MG: 2.5 TABLET, FILM COATED ORAL at 22:56

## 2019-04-07 RX ADMIN — PIPERACILLIN AND TAZOBACTAM 3.38 G: 3; .375 INJECTION, POWDER, FOR SOLUTION INTRAVENOUS at 23:06

## 2019-04-07 RX ADMIN — CLONAZEPAM 4 MG: 1 TABLET ORAL at 22:56

## 2019-04-07 RX ADMIN — DEXTROSE MONOHYDRATE, SODIUM CHLORIDE, AND POTASSIUM CHLORIDE 150 ML/HR: 50; 4.5; 1.49 INJECTION, SOLUTION INTRAVENOUS at 07:24

## 2019-04-07 RX ADMIN — AMITRIPTYLINE HYDROCHLORIDE 100 MG: 50 TABLET, FILM COATED ORAL at 22:56

## 2019-04-07 RX ADMIN — DEXTROSE MONOHYDRATE, SODIUM CHLORIDE, AND POTASSIUM CHLORIDE 150 ML/HR: 50; 4.5; 1.49 INJECTION, SOLUTION INTRAVENOUS at 21:00

## 2019-04-07 RX ADMIN — OLANZAPINE 5 MG: 2.5 TABLET, FILM COATED ORAL at 03:46

## 2019-04-07 RX ADMIN — PIPERACILLIN AND TAZOBACTAM 3.38 G: 3; .375 INJECTION, POWDER, FOR SOLUTION INTRAVENOUS at 05:46

## 2019-04-07 RX ADMIN — LEVOTHYROXINE SODIUM 125 MCG: 125 TABLET ORAL at 05:45

## 2019-04-07 RX ADMIN — HYDROMORPHONE HYDROCHLORIDE 1 MG: 2 INJECTION INTRAMUSCULAR; INTRAVENOUS; SUBCUTANEOUS at 09:55

## 2019-04-07 NOTE — PROGRESS NOTES
Assessment completed and documented. Lung sounds clear. Heart sounds regular with possible murmur noted. Stoma pink and moist and dressing clean/dry/intact. Bishop in place. Bowel sounds active. BLE weakness. NGT to left nare having green output. Patient currently resting in bed. Will continue to monitor with hourly rounding.

## 2019-04-07 NOTE — PROGRESS NOTES
Resting quietly, awake, resp even, unlab with O2 intact. Skin warm, dry. Abdom soft with hypoactive bowel sounds, incision intact with staples, dsg clean, dry, intact. Ostomy stoma red, moist, bag intact, stool liquid brown. NG intact to left nare to LIS, output green. Dilaudid 0.5 mg given IVP slowly for c/o sore throat pain.

## 2019-04-07 NOTE — PROGRESS NOTES
04/07/19 1202 Incentive Spirometry Treatment Level of Service Independent Actual Volume (ml) 1250 ml Pulmonary Toilet Pulmonary Toilet Incentive Spirometry

## 2019-04-07 NOTE — PROGRESS NOTES
No new issues. Getting a little frustrated Visit Vitals /64 (BP 1 Location: Right arm, BP Patient Position: At rest;Supine) Pulse 90 Temp 98 °F (36.7 °C) Resp 18 Ht 5' 8\" (1.727 m) Wt 140 lb (63.5 kg) SpO2 97% BMI 21.29 kg/m² Good UOP Lungs-clear CV-reg 
abd-no bowel sounds, no ostomy output. Wound noncellulitic Plan- 
Continue postop care, await return of GI function. Keep brito due to cystotomy repair Empiric abx s/p enterotomy repairs

## 2019-04-07 NOTE — PROGRESS NOTES
Resting quietly, resp even, unlab with O2. Eyes closed with relaxed facial expression. No distress noted.

## 2019-04-08 LAB
ANION GAP SERPL CALC-SCNC: 6 MMOL/L
BUN SERPL-MCNC: 6 MG/DL (ref 8–23)
CALCIUM SERPL-MCNC: 8.2 MG/DL (ref 8.3–10.4)
CHLORIDE SERPL-SCNC: 92 MMOL/L (ref 98–107)
CO2 SERPL-SCNC: 36 MMOL/L (ref 21–32)
CREAT SERPL-MCNC: 0.8 MG/DL (ref 0.8–1.5)
ERYTHROCYTE [DISTWIDTH] IN BLOOD BY AUTOMATED COUNT: 13.1 % (ref 11.9–14.6)
GLUCOSE SERPL-MCNC: 140 MG/DL (ref 65–100)
HCT VFR BLD AUTO: 25 % (ref 41.1–50.3)
HGB BLD-MCNC: 8 G/DL (ref 13.6–17.2)
MCH RBC QN AUTO: 29.3 PG (ref 26.1–32.9)
MCHC RBC AUTO-ENTMCNC: 32 G/DL (ref 31.4–35)
MCV RBC AUTO: 91.6 FL (ref 79.6–97.8)
NRBC # BLD: 0 K/UL (ref 0–0.2)
PLATELET # BLD AUTO: 166 K/UL (ref 150–450)
PMV BLD AUTO: 9.7 FL (ref 9.4–12.3)
POTASSIUM SERPL-SCNC: 3.6 MMOL/L (ref 3.5–5.1)
RBC # BLD AUTO: 2.73 M/UL (ref 4.23–5.6)
SODIUM SERPL-SCNC: 134 MMOL/L (ref 136–145)
WBC # BLD AUTO: 4.4 K/UL (ref 4.3–11.1)

## 2019-04-08 PROCEDURE — 74011250637 HC RX REV CODE- 250/637: Performed by: SURGERY

## 2019-04-08 PROCEDURE — 77010033678 HC OXYGEN DAILY

## 2019-04-08 PROCEDURE — 74011250636 HC RX REV CODE- 250/636: Performed by: SURGERY

## 2019-04-08 PROCEDURE — 74011000258 HC RX REV CODE- 258: Performed by: SURGERY

## 2019-04-08 PROCEDURE — 94762 N-INVAS EAR/PLS OXIMTRY CONT: CPT

## 2019-04-08 PROCEDURE — 65270000029 HC RM PRIVATE

## 2019-04-08 PROCEDURE — 36415 COLL VENOUS BLD VENIPUNCTURE: CPT

## 2019-04-08 PROCEDURE — 85027 COMPLETE CBC AUTOMATED: CPT

## 2019-04-08 PROCEDURE — 80048 BASIC METABOLIC PNL TOTAL CA: CPT

## 2019-04-08 RX ORDER — DEXTROSE, SODIUM CHLORIDE, AND POTASSIUM CHLORIDE 5; .9; .15 G/100ML; G/100ML; G/100ML
100 INJECTION INTRAVENOUS CONTINUOUS
Status: DISCONTINUED | OUTPATIENT
Start: 2019-04-09 | End: 2019-04-09

## 2019-04-08 RX ORDER — DIPHENHYDRAMINE HYDROCHLORIDE 50 MG/ML
12.5 INJECTION, SOLUTION INTRAMUSCULAR; INTRAVENOUS
Status: DISCONTINUED | OUTPATIENT
Start: 2019-04-08 | End: 2019-05-03 | Stop reason: HOSPADM

## 2019-04-08 RX ADMIN — Medication 1 AMPULE: at 09:38

## 2019-04-08 RX ADMIN — DEXTROSE MONOHYDRATE, SODIUM CHLORIDE, AND POTASSIUM CHLORIDE 150 ML/HR: 50; 4.5; 1.49 INJECTION, SOLUTION INTRAVENOUS at 12:12

## 2019-04-08 RX ADMIN — PIPERACILLIN AND TAZOBACTAM 3.38 G: 3; .375 INJECTION, POWDER, FOR SOLUTION INTRAVENOUS at 22:52

## 2019-04-08 RX ADMIN — HYDROMORPHONE HYDROCHLORIDE 0.5 MG: 2 INJECTION INTRAMUSCULAR; INTRAVENOUS; SUBCUTANEOUS at 15:19

## 2019-04-08 RX ADMIN — PIPERACILLIN AND TAZOBACTAM 3.38 G: 3; .375 INJECTION, POWDER, FOR SOLUTION INTRAVENOUS at 14:40

## 2019-04-08 RX ADMIN — HYDROMORPHONE HYDROCHLORIDE 0.5 MG: 2 INJECTION INTRAMUSCULAR; INTRAVENOUS; SUBCUTANEOUS at 05:38

## 2019-04-08 RX ADMIN — Medication 1 AMPULE: at 20:49

## 2019-04-08 RX ADMIN — VITAMIN D, TAB 1000IU (100/BT) 2000 UNITS: 25 TAB at 18:24

## 2019-04-08 RX ADMIN — VITAMIN D, TAB 1000IU (100/BT) 2000 UNITS: 25 TAB at 09:37

## 2019-04-08 RX ADMIN — LEVOTHYROXINE SODIUM 125 MCG: 125 TABLET ORAL at 05:41

## 2019-04-08 RX ADMIN — OLANZAPINE 2.5 MG: 2.5 TABLET, FILM COATED ORAL at 22:51

## 2019-04-08 RX ADMIN — PIPERACILLIN AND TAZOBACTAM 3.38 G: 3; .375 INJECTION, POWDER, FOR SOLUTION INTRAVENOUS at 06:38

## 2019-04-08 RX ADMIN — AMITRIPTYLINE HYDROCHLORIDE 100 MG: 50 TABLET, FILM COATED ORAL at 22:51

## 2019-04-08 RX ADMIN — CLONAZEPAM 4 MG: 1 TABLET ORAL at 22:50

## 2019-04-08 RX ADMIN — DEXTROSE MONOHYDRATE, SODIUM CHLORIDE, AND POTASSIUM CHLORIDE 150 ML/HR: 50; 4.5; 1.49 INJECTION, SOLUTION INTRAVENOUS at 03:52

## 2019-04-08 RX ADMIN — DEXTROSE MONOHYDRATE, SODIUM CHLORIDE, AND POTASSIUM CHLORIDE 150 ML/HR: 50; 4.5; 1.49 INJECTION, SOLUTION INTRAVENOUS at 18:29

## 2019-04-08 NOTE — PROGRESS NOTES
Shagufta Narvaez, RT in room. Aqua pack applied to O2, remaining at 6L N/C with O2 sat greater than 90%. No distress noted.

## 2019-04-08 NOTE — PROGRESS NOTES
O2 sat 84% on 3L N/C, arousing easily, resp even, unlab. O2 increased to 6 L N/C, with improved O2 sat to 88%.  Shagufta Narvaez, RT notified for asst.

## 2019-04-08 NOTE — PROGRESS NOTES
Assessment completed and documented. Lung sounds clear with productive cough. Heart sounds occasionally irregular. Abdomen soft with active bowel sounds. Incision clean/dry/intact. Stoma pale pink. NG tube having green/brown output. Patient currently resting in bed. Will continue to monitor with hourly rounding.

## 2019-04-08 NOTE — PROGRESS NOTES
Nutrition follow-up: 
Reason for initial assessment: Length of stay Assessment: DIET NPO With [employer], With Sips of Clear Fluids, Except Meds Food/Nutrition Patient History:  Pt presented with 1 week history of abdominal bloating and pain, decreased colostomy output. Past medical history notable for rectal cancer, ileostomy, colostomy in 2014. POD 5 surgery/lysis of adhesions, small bowel resection. NG LIWS 3400 ml out yesterday. Pt with active bowel sounds, \"loose\" recorded for stool appearance by current RN who also reports \"no flatus, flat bag. \" Pertinent medications: D51/2NS with 20KCl mEq/L @ 150ml/hr, zosyn Remarkable labs: Na 134, Cl 92, Glu 140 Anthropometrics:Height: 5' 8\" (172.7 cm),  Weight: 63.5 kg (140 lb), Weight Source: Patient stated, Body mass index is 21.29 kg/m². BMI class of underweight for Older American Male. BMI recommended (23-30) Macronutrient needs: EER:  8565-0404 kcal /day (30-35 kcal/kg BW) EPR:  76-89 grams protein/day (1.2-1.4 grams/kg BW) GFR >60 Intake/Comparative Standards:  
NPO; pt does not meet estimated kcal or protein needs r/t NPO status. Current IVF provides 612 non protein kcal/day. Nutrition Diagnosis: Inadequate oral intake r/t alteration in GI tract as evidenced byPOD5 surgery-lysis of adhesions, SB resection, NPO status with high output via NGT (3400ml).   
Intervention: 
Meals andsnacks: per surgery Parenteral Nutrition: Recommend consider central line access and TPN for primary needs secondary to high volume NG output and POC indicates awaiting GI function. Coordination of Care: Tye Mallory RN, request to discuss with rounding surgery if RD not available at that time. Discharge nutrition plan: Too soon to determine. [place], [address], [place] [address]

## 2019-04-08 NOTE — PROGRESS NOTES
Resting quietly, resp even, unlab with O2 at 3L N/C. Skin warm, dry. NG intact to left nare to LIS, output brown. Abdom soft, tender with hypoactive bowel sounds, incision intact with staples, dsg clean, dry, intact. Ostomy with pink stoma, bag intact with scant amount of liquid brown drainage. Assessment noted. No c/o. No distress.

## 2019-04-08 NOTE — PROGRESS NOTES
Reported to Dr. Rigoberto Brody NG output color having changed from green to brown to serosanguinous; orders received.

## 2019-04-08 NOTE — PROGRESS NOTES
Problem: Falls - Risk of 
Goal: *Absence of Falls Description Document [place] Fall Risk and appropriate interventions in the flowsheet. Outcome: Progressing Towards Goal 
  
Problem: Pressure Injury - Risk of 
Goal: *Prevention of pressure injury Description Document Josef Scale and appropriate interventions in the flowsheet. Outcome: Progressing Towards Goal 
  
Problem: Patient Education: Go to Patient Education Activity Goal: Patient/Family Education Outcome: Progressing Towards Goal 
  
Problem: Major Small and Large Bowel Procedure: Post-Op Day 1 Goal: Off Pathway (Use only if patient is Off Pathway) Outcome: Progressing Towards Goal 
Goal: Activity/Safety Outcome: Progressing Towards Goal 
Goal: Consults, if ordered Outcome: Progressing Towards Goal 
Goal: Diagnostic Test/Procedures Outcome: Progressing Towards Goal 
Goal: Nutrition/Diet Outcome: Progressing Towards Goal 
Goal: Discharge Planning Outcome: Progressing Towards Goal 
Goal: Medications Outcome: Progressing Towards Goal 
Goal: Respiratory Outcome: Progressing Towards Goal 
Goal: Treatments/Interventions/Procedures Outcome: Progressing Towards Goal 
Goal: Psychosocial 
Outcome: Progressing Towards Goal 
Goal: *Optimal pain control at patient's stated goal 
Outcome: Progressing Towards Goal 
Goal: *Adequate urinary output (equal to or greater than 30 milliliters/hour) Outcome: Progressing Towards Goal 
Goal: *Hemodynamically stable Outcome: Progressing Towards Goal 
Goal: *Tolerating diet Outcome: Progressing Towards Goal 
Goal: *Demonstrates progressive activity Outcome: Progressing Towards Goal 
Goal: *Lungs clear or at baseline Outcome: Progressing Towards Goal 
  
Problem: Nutrition Deficit Goal: *Optimize nutritional status Outcome: Progressing Towards Goal

## 2019-04-09 LAB
ANION GAP SERPL CALC-SCNC: 5 MMOL/L
BUN SERPL-MCNC: 6 MG/DL (ref 8–23)
CALCIUM SERPL-MCNC: 7.8 MG/DL (ref 8.3–10.4)
CHLORIDE SERPL-SCNC: 94 MMOL/L (ref 98–107)
CO2 SERPL-SCNC: 36 MMOL/L (ref 21–32)
CREAT SERPL-MCNC: 0.76 MG/DL (ref 0.8–1.5)
GLUCOSE SERPL-MCNC: 122 MG/DL (ref 65–100)
MAGNESIUM SERPL-MCNC: 1.6 MG/DL (ref 1.8–2.4)
MAGNESIUM SERPL-MCNC: 2.7 MG/DL (ref 1.8–2.4)
PHOSPHATE SERPL-MCNC: 3.1 MG/DL (ref 2.3–3.7)
POTASSIUM SERPL-SCNC: 3.8 MMOL/L (ref 3.5–5.1)
SODIUM SERPL-SCNC: 135 MMOL/L (ref 136–145)
TRIGL SERPL-MCNC: 102 MG/DL (ref 35–150)

## 2019-04-09 PROCEDURE — 74011000258 HC RX REV CODE- 258: Performed by: SURGERY

## 2019-04-09 PROCEDURE — 97161 PT EVAL LOW COMPLEX 20 MIN: CPT

## 2019-04-09 PROCEDURE — 76937 US GUIDE VASCULAR ACCESS: CPT

## 2019-04-09 PROCEDURE — 83735 ASSAY OF MAGNESIUM: CPT

## 2019-04-09 PROCEDURE — 84478 ASSAY OF TRIGLYCERIDES: CPT

## 2019-04-09 PROCEDURE — 74011250637 HC RX REV CODE- 250/637: Performed by: SURGERY

## 2019-04-09 PROCEDURE — 36415 COLL VENOUS BLD VENIPUNCTURE: CPT

## 2019-04-09 PROCEDURE — 80048 BASIC METABOLIC PNL TOTAL CA: CPT

## 2019-04-09 PROCEDURE — 77010033678 HC OXYGEN DAILY

## 2019-04-09 PROCEDURE — 94762 N-INVAS EAR/PLS OXIMTRY CONT: CPT

## 2019-04-09 PROCEDURE — 74011000250 HC RX REV CODE- 250: Performed by: SURGERY

## 2019-04-09 PROCEDURE — 3E0336Z INTRODUCTION OF NUTRITIONAL SUBSTANCE INTO PERIPHERAL VEIN, PERCUTANEOUS APPROACH: ICD-10-PCS | Performed by: SURGERY

## 2019-04-09 PROCEDURE — 74011250636 HC RX REV CODE- 250/636: Performed by: SURGERY

## 2019-04-09 PROCEDURE — 97116 GAIT TRAINING THERAPY: CPT

## 2019-04-09 PROCEDURE — 84100 ASSAY OF PHOSPHORUS: CPT

## 2019-04-09 PROCEDURE — 65270000029 HC RM PRIVATE

## 2019-04-09 RX ORDER — MAGNESIUM SULFATE HEPTAHYDRATE 40 MG/ML
2 INJECTION, SOLUTION INTRAVENOUS ONCE
Status: COMPLETED | OUTPATIENT
Start: 2019-04-09 | End: 2019-04-09

## 2019-04-09 RX ORDER — METOCLOPRAMIDE HYDROCHLORIDE 5 MG/ML
10 INJECTION INTRAMUSCULAR; INTRAVENOUS EVERY 6 HOURS
Status: COMPLETED | OUTPATIENT
Start: 2019-04-09 | End: 2019-04-12

## 2019-04-09 RX ADMIN — METOCLOPRAMIDE 10 MG: 5 INJECTION, SOLUTION INTRAMUSCULAR; INTRAVENOUS at 23:02

## 2019-04-09 RX ADMIN — Medication 1 AMPULE: at 21:15

## 2019-04-09 RX ADMIN — OLANZAPINE 2.5 MG: 2.5 TABLET, FILM COATED ORAL at 23:02

## 2019-04-09 RX ADMIN — Medication 1 AMPULE: at 08:59

## 2019-04-09 RX ADMIN — PIPERACILLIN AND TAZOBACTAM 3.38 G: 3; .375 INJECTION, POWDER, FOR SOLUTION INTRAVENOUS at 06:35

## 2019-04-09 RX ADMIN — PIPERACILLIN AND TAZOBACTAM 3.38 G: 3; .375 INJECTION, POWDER, FOR SOLUTION INTRAVENOUS at 21:14

## 2019-04-09 RX ADMIN — MAGNESIUM SULFATE IN WATER 2 G: 40 INJECTION, SOLUTION INTRAVENOUS at 13:43

## 2019-04-09 RX ADMIN — METOCLOPRAMIDE 10 MG: 5 INJECTION, SOLUTION INTRAMUSCULAR; INTRAVENOUS at 21:15

## 2019-04-09 RX ADMIN — CLONAZEPAM 2 MG: 1 TABLET ORAL at 02:14

## 2019-04-09 RX ADMIN — CALCIUM GLUCONATE: 98 INJECTION, SOLUTION INTRAVENOUS at 17:19

## 2019-04-09 RX ADMIN — AMITRIPTYLINE HYDROCHLORIDE 100 MG: 50 TABLET, FILM COATED ORAL at 23:02

## 2019-04-09 RX ADMIN — LEVOTHYROXINE SODIUM 125 MCG: 125 TABLET ORAL at 06:35

## 2019-04-09 RX ADMIN — DEXTROSE MONOHYDRATE, SODIUM CHLORIDE, AND POTASSIUM CHLORIDE 100 ML/HR: 50; 9; 1.49 INJECTION, SOLUTION INTRAVENOUS at 11:25

## 2019-04-09 RX ADMIN — VITAMIN D, TAB 1000IU (100/BT) 2000 UNITS: 25 TAB at 17:19

## 2019-04-09 RX ADMIN — VITAMIN D, TAB 1000IU (100/BT) 2000 UNITS: 25 TAB at 09:00

## 2019-04-09 RX ADMIN — CLONAZEPAM 4 MG: 1 TABLET ORAL at 23:02

## 2019-04-09 RX ADMIN — HYDROMORPHONE HYDROCHLORIDE 0.5 MG: 2 INJECTION INTRAMUSCULAR; INTRAVENOUS; SUBCUTANEOUS at 08:57

## 2019-04-09 RX ADMIN — PIPERACILLIN AND TAZOBACTAM 3.38 G: 3; .375 INJECTION, POWDER, FOR SOLUTION INTRAVENOUS at 14:55

## 2019-04-09 RX ADMIN — OLANZAPINE 5 MG: 2.5 TABLET, FILM COATED ORAL at 02:14

## 2019-04-09 RX ADMIN — I.V. FAT EMULSION 250 ML: 20 EMULSION INTRAVENOUS at 17:21

## 2019-04-09 RX ADMIN — DEXTROSE MONOHYDRATE, SODIUM CHLORIDE, AND POTASSIUM CHLORIDE 100 ML/HR: 50; 9; 1.49 INJECTION, SOLUTION INTRAVENOUS at 02:18

## 2019-04-09 NOTE — PROGRESS NOTES
Care Management Interventions PCP Verified by CM: Yes Transition of Care Consult (CM Consult): Discharge Planning Current Support Network: Own Home, Lives Alone Confirm Follow Up Transport: Family Discharge Location Discharge Placement: Home Chart reviewed. Per MD pt will start on TPN today. SW will continue to follow for d/c needs.

## 2019-04-09 NOTE — PROGRESS NOTES
Seems depressed- not seeing any improvement Visit Vitals /72 (BP 1 Location: Right arm, BP Patient Position: At rest) Pulse 81 Temp 99.7 °F (37.6 °C) Resp 17 Ht 5' 8\" (1.727 m) Wt 140 lb (63.5 kg) SpO2 97% BMI 21.29 kg/m² Significant NG output. Intermittently bloody Lungs- coarse, but mostly upper airway- has productive cough 
cv-reg 
abd- minimal bowel sounds. Soft, no increased distention. Lab Results Component Value Date/Time Sodium 134 (L) 04/08/2019 06:11 AM  
 Potassium 3.6 04/08/2019 06:11 AM  
 Chloride 92 (L) 04/08/2019 06:11 AM  
 CO2 36 (H) 04/08/2019 06:11 AM  
 Anion gap 6 04/08/2019 06:11 AM  
 Glucose 140 (H) 04/08/2019 06:11 AM  
 BUN 6 (L) 04/08/2019 06:11 AM  
 Creatinine 0.80 04/08/2019 06:11 AM  
 BUN/Creatinine ratio 24 12/07/2018 09:01 AM  
 GFR est AA >60 04/08/2019 06:11 AM  
 GFR est non-AA >60 04/08/2019 06:11 AM  
 Calcium 8.2 (L) 04/08/2019 06:11 AM  
  
Plan- 
Continue npo/ngt Mobilize- he is upset that he was told not to walk (RN felt he was too weak at the time) We discussed not starting TPN, however after subsequently reviewing electrolytes and his overall status, will start tpn tomorrow

## 2019-04-09 NOTE — PROGRESS NOTES
Nutrition follow-up: 
Consult received for TPN management per nutrition support protocols/ Dr. Mekhi Cheema 4/09. Reason for initial assessment: Length of stay Assessment: DIET NPO With Roh[employer] Ce[employer], With Sips of Clear Fluids, Except Meds TPN ADULT - dextrose 5% amino acid 4.25% Food/Nutrition Patient History:  Pt presented with 1 week history of abdominal bloating and pain, decreased colostomy output. Past medical history notable for rectal cancer, ileostomy, colostomy in 2014. Central line access : Plan for PICC line placement 4/09. POD 6 surgery/lysis of adhesions, small bowel resection. NG LIWS-significant output 4/08. Abdomen: hypoactive bowel sounds. No documented stool today. Pertinent Medications: IVF: Dex 5% 0.9% sodium chloride with KCl 20 mEq/L at 100 ml/hr, Zosyn Pertinent Labs: Hyponatremia 135, hypomagnesemia 1.6, glucose 122 mg/dl. Anthropometrics:Height: 5' 8\" (172.7 cm),  Weight: 63.5 kg (140 lb), Weight Source: Patient stated, Body mass index is 21.29 kg/m². BMI class of underweight for Older American Male. BMI recommended (23-30) Macronutrient needs: EER:  2163-3706 kcal /day (30-35 kcal/kg BW) EPR:  76-89 grams protein/day (1.2-1.4 grams/kg BW) GFR >60 Max CHO:  366 grams/day (4mg/kg BW/min) Fluid:  1ml/kcal 
Intake/Comparative Standards: Current NPO status does not meet kcal or protein needs Nutrition Diagnosis: Inadequate oral intake r/t alteration in GI tract as evidenced by POD 6 surgery-lysis of adhesions, SB resection, NPO status with high output via NGT. Intervention:  
Meals and snacks: NPO Nutritional Supplement Therapy: Electrolyte replacement per nutritional support protocols are active on MAR. PN/IVF:  
1. Start PPN: 5%DEX/ 4.25%AA at 83 ml/hr with 250 ml 20% Lipids daily provides  1180 kcal/d (62% of needs), 85 grams of protein/d (100% of needs), 100 grams of CHO/d (does not exceed maximum CHO load) and 2250 ml of total volume/d ( 100% of needs). 2. Discontinue IVF once PPN starts. 3. Potential goal TPN: 15%DEX/ 5%AA at 83 ml/hr with 250 ml 20% Lipids daily provides  1920 kcal/d (100% of needs), 100 grams of protein/d (112% of needs), 300 grams of CHO/d (does not exceed maximum CHO load) and 2250 ml of total volume/d ( 100% of needs). 4. Add TPN labs, POC Glucoses/SSI if Glucose > 180 mg/dl on AM BMP. Coordination of Nutrition Care: Ignacia Campos RN, Interdisciplinary Rounds, Gabby Friedman, PharmD Discharge nutrition plan: Too soon to determine Jose Webber, MPH, [address] [place] [address],  
[phone]

## 2019-04-09 NOTE — PROGRESS NOTES
Problem: Falls - Risk of 
Goal: *Absence of Falls Description Document Hilary Brody Fall Risk and appropriate interventions in the flowsheet. Outcome: Progressing Towards Goal 
  
Problem: Pressure Injury - Risk of 
Goal: *Prevention of pressure injury Description Document Josef Scale and appropriate interventions in the flowsheet. Outcome: Progressing Towards Goal 
  
Problem: Patient Education: Go to Patient Education Activity Goal: Patient/Family Education Outcome: Progressing Towards Goal 
  
Problem: Major Small and Large Bowel Procedure: Post-Op Day 1 Goal: Off Pathway (Use only if patient is Off Pathway) Outcome: Progressing Towards Goal 
Goal: Activity/Safety Outcome: Progressing Towards Goal 
Goal: Consults, if ordered Outcome: Progressing Towards Goal 
Goal: Diagnostic Test/Procedures Outcome: Progressing Towards Goal 
Goal: Nutrition/Diet Outcome: Progressing Towards Goal 
Goal: Discharge Planning Outcome: Progressing Towards Goal 
Goal: Medications Outcome: Progressing Towards Goal 
Goal: Respiratory Outcome: Progressing Towards Goal 
Goal: Treatments/Interventions/Procedures Outcome: Progressing Towards Goal 
Goal: Psychosocial 
Outcome: Progressing Towards Goal 
Goal: *Optimal pain control at patient's stated goal 
Outcome: Progressing Towards Goal 
Goal: *Adequate urinary output (equal to or greater than 30 milliliters/hour) Outcome: Progressing Towards Goal 
Goal: *Hemodynamically stable Outcome: Progressing Towards Goal 
Goal: *Tolerating diet Outcome: Progressing Towards Goal 
Goal: *Demonstrates progressive activity Outcome: Progressing Towards Goal 
Goal: *Lungs clear or at baseline Outcome: Progressing Towards Goal 
  
Problem: Nutrition Deficit Goal: *Optimize nutritional status Outcome: Progressing Towards Goal

## 2019-04-09 NOTE — PROGRESS NOTES
Shift assessment completed via doc flow sheet. Pt A & O x 4. Lungs CTA, but diminished. HR WNL, NSR. Abdomen soft and tender. No c/o pain at this time. IVS c/d/i, no s/sx of infection/infiltration noted. Pt able to make needs known. No concerns at this time. Bed low and locked. Call light within reach. Will continue to monitor.

## 2019-04-09 NOTE — PROGRESS NOTES
lilly arms assessed for picc line placement both basilic veins would not collapse large nerve bundle above brachial, no other veins large enough to accommodate line .

## 2019-04-09 NOTE — PROGRESS NOTES
Assessment completed and documented. Lung sounds diminished with scattered wheezing. Occasional irregularities still noted when auscultating heart. Abdomen soft with hypoactive bowel sounds. Incision clean/dry/intact. Medicated for pain. Currently resting in bed. Will continue to monitor with hourly rounding.

## 2019-04-09 NOTE — PROGRESS NOTES
No new problems. States he feels about the same as yesterday No gas/liquid per ostomy Visit Vitals /68 (BP 1 Location: Right arm, BP Patient Position: At rest;Head of bed elevated (Comment degrees)) Pulse 87 Temp 97.9 °F (36.6 °C) Resp 18 Ht 5' 8\" (1.727 m) Wt 140 lb (63.5 kg) SpO2 99% BMI 21.29 kg/m² Overall looks better today -somewhat brighter affect. Lungs- less coarse 
cv-reg, occ PVC 
abd-rare BS. Incision ok Bishop clear urine Plan- 
Starting PPN due to inability to place PICC Retrograde cystogram tomorrow s/p bladder repair Try reglan

## 2019-04-09 NOTE — PROGRESS NOTES
Informed MD that a picc was not able to be placed and that heart rhythm sounds more irregular than previously noted.

## 2019-04-10 ENCOUNTER — HOSPITAL ENCOUNTER (INPATIENT)
Dept: GENERAL RADIOLOGY | Age: 74
Discharge: HOME | DRG: 330 | End: 2019-04-10
Attending: SURGERY
Payer: MEDICARE

## 2019-04-10 LAB
ANION GAP SERPL CALC-SCNC: 7 MMOL/L
BUN SERPL-MCNC: 15 MG/DL (ref 8–23)
CALCIUM SERPL-MCNC: 8.1 MG/DL (ref 8.3–10.4)
CHLORIDE SERPL-SCNC: 93 MMOL/L (ref 98–107)
CO2 SERPL-SCNC: 34 MMOL/L (ref 21–32)
CREAT SERPL-MCNC: 0.73 MG/DL (ref 0.8–1.5)
ERYTHROCYTE [DISTWIDTH] IN BLOOD BY AUTOMATED COUNT: 12.9 % (ref 11.9–14.6)
GLUCOSE SERPL-MCNC: 118 MG/DL (ref 65–100)
HCT VFR BLD AUTO: 25.7 % (ref 41.1–50.3)
HGB BLD-MCNC: 8.1 G/DL (ref 13.6–17.2)
MAGNESIUM SERPL-MCNC: 2 MG/DL (ref 1.8–2.4)
MCH RBC QN AUTO: 28.3 PG (ref 26.1–32.9)
MCHC RBC AUTO-ENTMCNC: 31.5 G/DL (ref 31.4–35)
MCV RBC AUTO: 89.9 FL (ref 79.6–97.8)
NRBC # BLD: 0 K/UL (ref 0–0.2)
PHOSPHATE SERPL-MCNC: 3.7 MG/DL (ref 2.3–3.7)
PLATELET # BLD AUTO: 281 K/UL (ref 150–450)
PMV BLD AUTO: 9.4 FL (ref 9.4–12.3)
POTASSIUM SERPL-SCNC: 3.6 MMOL/L (ref 3.5–5.1)
RBC # BLD AUTO: 2.86 M/UL (ref 4.23–5.6)
SODIUM SERPL-SCNC: 134 MMOL/L (ref 136–145)
TRIGL SERPL-MCNC: 94 MG/DL (ref 35–150)
WBC # BLD AUTO: 7.9 K/UL (ref 4.3–11.1)

## 2019-04-10 PROCEDURE — 85027 COMPLETE CBC AUTOMATED: CPT

## 2019-04-10 PROCEDURE — 74011000250 HC RX REV CODE- 250: Performed by: SURGERY

## 2019-04-10 PROCEDURE — 97530 THERAPEUTIC ACTIVITIES: CPT

## 2019-04-10 PROCEDURE — BT100ZZ FLUOROSCOPY OF BLADDER USING HIGH OSMOLAR CONTRAST: ICD-10-PCS | Performed by: RADIOLOGY

## 2019-04-10 PROCEDURE — 36415 COLL VENOUS BLD VENIPUNCTURE: CPT

## 2019-04-10 PROCEDURE — 77010033678 HC OXYGEN DAILY

## 2019-04-10 PROCEDURE — 97116 GAIT TRAINING THERAPY: CPT

## 2019-04-10 PROCEDURE — 74011250637 HC RX REV CODE- 250/637: Performed by: SURGERY

## 2019-04-10 PROCEDURE — 94762 N-INVAS EAR/PLS OXIMTRY CONT: CPT

## 2019-04-10 PROCEDURE — 74011250636 HC RX REV CODE- 250/636: Performed by: SURGERY

## 2019-04-10 PROCEDURE — 51600 INJECTION FOR BLADDER X-RAY: CPT

## 2019-04-10 PROCEDURE — 84478 ASSAY OF TRIGLYCERIDES: CPT

## 2019-04-10 PROCEDURE — 80048 BASIC METABOLIC PNL TOTAL CA: CPT

## 2019-04-10 PROCEDURE — 65270000029 HC RM PRIVATE

## 2019-04-10 PROCEDURE — 84100 ASSAY OF PHOSPHORUS: CPT

## 2019-04-10 PROCEDURE — 74011000258 HC RX REV CODE- 258: Performed by: SURGERY

## 2019-04-10 PROCEDURE — 74011636320 HC RX REV CODE- 636/320: Performed by: SURGERY

## 2019-04-10 PROCEDURE — 83735 ASSAY OF MAGNESIUM: CPT

## 2019-04-10 RX ADMIN — METOCLOPRAMIDE 10 MG: 5 INJECTION, SOLUTION INTRAMUSCULAR; INTRAVENOUS at 14:42

## 2019-04-10 RX ADMIN — METOCLOPRAMIDE 10 MG: 5 INJECTION, SOLUTION INTRAMUSCULAR; INTRAVENOUS at 18:02

## 2019-04-10 RX ADMIN — PIPERACILLIN AND TAZOBACTAM 3.38 G: 3; .375 INJECTION, POWDER, FOR SOLUTION INTRAVENOUS at 22:00

## 2019-04-10 RX ADMIN — Medication 1 AMPULE: at 22:00

## 2019-04-10 RX ADMIN — AMITRIPTYLINE HYDROCHLORIDE 100 MG: 50 TABLET, FILM COATED ORAL at 22:27

## 2019-04-10 RX ADMIN — CLONAZEPAM 4 MG: 1 TABLET ORAL at 22:27

## 2019-04-10 RX ADMIN — CALCIUM GLUCONATE: 98 INJECTION, SOLUTION INTRAVENOUS at 18:12

## 2019-04-10 RX ADMIN — LEVOTHYROXINE SODIUM 125 MCG: 125 TABLET ORAL at 07:24

## 2019-04-10 RX ADMIN — CLONAZEPAM 2 MG: 1 TABLET ORAL at 02:02

## 2019-04-10 RX ADMIN — VITAMIN D, TAB 1000IU (100/BT) 2000 UNITS: 25 TAB at 08:12

## 2019-04-10 RX ADMIN — IOTHALAMATE MEGLUMINE 250 ML: 172 INJECTION URETERAL at 08:49

## 2019-04-10 RX ADMIN — Medication 1 AMPULE: at 08:12

## 2019-04-10 RX ADMIN — VITAMIN D, TAB 1000IU (100/BT) 2000 UNITS: 25 TAB at 18:02

## 2019-04-10 RX ADMIN — METOCLOPRAMIDE 10 MG: 5 INJECTION, SOLUTION INTRAMUSCULAR; INTRAVENOUS at 06:00

## 2019-04-10 RX ADMIN — PIPERACILLIN AND TAZOBACTAM 3.38 G: 3; .375 INJECTION, POWDER, FOR SOLUTION INTRAVENOUS at 14:42

## 2019-04-10 RX ADMIN — I.V. FAT EMULSION 250 ML: 20 EMULSION INTRAVENOUS at 18:13

## 2019-04-10 RX ADMIN — OLANZAPINE 2.5 MG: 2.5 TABLET, FILM COATED ORAL at 22:27

## 2019-04-10 RX ADMIN — OLANZAPINE 5 MG: 2.5 TABLET, FILM COATED ORAL at 02:01

## 2019-04-10 RX ADMIN — IOTHALAMATE MEGLUMINE 100 ML: 172 INJECTION URETERAL at 08:52

## 2019-04-10 RX ADMIN — PIPERACILLIN AND TAZOBACTAM 3.38 G: 3; .375 INJECTION, POWDER, FOR SOLUTION INTRAVENOUS at 07:24

## 2019-04-10 NOTE — PROGRESS NOTES
Nutrition follow-up: 
Consult received for TPN management per nutrition support protocols/ Dr. Mekhi Cheema 4/09. Reason for initial assessment: Length of stay Assessment: DIET NPO With Paty and Ceja, With Sips of Clear Fluids, Except Meds TPN ADULT - dextrose 5% amino acid 4.25% TPN ADULT - dextrose 5% amino acid 4.25% Food/Nutrition Patient History:  Pt presented with 1 week history of abdominal bloating and pain, decreased colostomy output. Past medical history notable for rectal cancer, ileostomy, colostomy in 2014. Central line access : Plan for PICC line placement 4/09. PICC team unable to place PICC. POD 7 surgery/lysis of adhesions, small bowel resection. NG LIWS-significant output 4/10,>1000ml. Abdomen: soft, hypoactive bowel sounds. No gas/liquid per ostomy. Pt states \"I feel lousy\"; requesting to be walked by PT. Pertinent Medications:  Zosyn, Reglan Pertinent Labs: Hyponatremia 134, hypomagnesemia-resolved, glucose 118 mg/dl. , Phos 3.7 Anthropometrics:Height: 5' 8\" (172.7 cm),  Weight: 63.5 kg (140 lb), Weight Source: Patient stated, Body mass index is 21.29 kg/m². BMI class of underweight for Older American Male. BMI recommended (23-30) 4/09: Bed weight 62.7 kg Macronutrient needs: EER:  1341-4972 kcal /day (30-35 kcal/kg BW) EPR:  76-89 grams protein/day (1.2-1.4 grams/kg BW) GFR >60 Max CHO:  366 grams/day (4mg/kg BW/min) Fluid:  1ml/kcal 
Intake/Comparative Standards: Current NPO status does not meet kcal or protein needs Intervention:  
Meals and snacks: NPO Nutritional Supplement Therapy: Electrolyte replacement per nutritional support protocols are active on MAR. PN: 1. Continue  PPN: 5%DEX/ 4.25%AA-increase rate to 125 ml/hr  with 250 ml 20% Lipids daily- provides 1520 kcal/d (80% of needs), 127.5 grams of protein/d (143% of needs-2 gm/kg/BW), 150 grams of CHO/d (does not exceed maximum CHO load) and 3250 ml of total volume/d ( >100% of needs). Additives: Sodium 45 mEq/L; K 40 mEq/L, Ca 4.5 mEq/L, Mg 5 mEq/L and Phos 15 mEq/L   
2. Add TPN labs, POC Glucoses/SSI if Glucose > 180 mg/dl on AM BMP. Coordination of Nutrition Care: CINDY FAUST, RN, Interdisciplinary Rounds, Fransico HernandezD Discharge nutrition plan: Too soon to determine Sosa Meek, MPH, [address] [place] [address],  
[phone]

## 2019-04-10 NOTE — PROGRESS NOTES
Problem: Mobility Impaired (Adult and Pediatric) Goal: *Acute Goals and Plan of Care (Insert Text) Description LTG: 
(1.)Mr. Marroquin will move from supine to sit and sit to supine  in bed with SUPERVISION within 7 treatment day(s). (2.)Mr. Marroquin will transfer from bed to chair and chair to bed with SUPERVISION using the least restrictive device within 7 treatment day(s). (3.)Mr. Marroquin will ambulate with SUPERVISION for 650 feet with the least restrictive device within 7 treatment day(s). (4)Mr. Marroquin will perform HEP with cues and assistance to increase safety on her feet in 7 days. (5)Mr. Marroquin will go up a flight of steps CGA with rail in 7 days. ________________________________________________________________________________________________ Outcome: Progressing Towards Goal 
  
PHYSICAL THERAPY: Daily Note and AM 4/10/2019 INPATIENT: PT Visit Days : 2 [place] / [place] / Product Type: Medicare /   
  
NAME/AGE/GENDER: Nely Stephens is a 74 y.o. male PRIMARY DIAGNOSIS: Small bowel obstruction ([facility] [address] [K56.609] Small bowel obstruction ([facility] [address] Small bowel obstruction ([facility] [address] Procedure(s) (LRB): 
EXPLORATORY LAPAROTOMY, LYSIS OF ADHESIONS, SMALL BOWEL RESECTION X1, ENTEROTOMY REPAIR X3, REPAIR OF CYSTOTOMY (N/A) 7 Days Post-Op ICD-10: Treatment Diagnosis:  
 · Generalized Muscle Weakness (M62.81) · Other abnormalities of gait and mobility (R26.89) Precaution/Allergies: 
Patient has no known allergies. ASSESSMENT:  
Mr. Amena Giordano presents with general weakness and decreased functional mobility s/p EXPLORATORY LAPAROTOMY, LYSIS OF ADHESIONS, SMALL BOWEL RESECTION X1, ENTEROTOMY REPAIR X3, REPAIR OF CYSTOTOMY on 4/3/19. He lives alone and was independent prior to hospitalization. He plans to go home. Pt. Reports he is not feeling well this am but he wants to get up.   He ambulated around the hospital floor on 4 liters, O2 sats in low 90's after gait, no SOB. He used the IV pole as an assistive device, no loss of balance. He actually ambulated a little faster the longer we ambulated. He stood for several minutes working on balance while navigating his lines and he wanted to brush his teeth. He was left in chair without complaints. This section established at most recent assessment PROBLEM LIST (Impairments causing functional limitations): 1. Decreased Strength 2. Decreased ADL/Functional Activities 3. Decreased Transfer Abilities 4. Decreased Ambulation Ability/Technique 5. Decreased Balance 6. Decreased Activity Tolerance 7. Increased Fatigue 8. Increased Shortness of Breath 9. Decreased Flexibility/Joint Mobility 10. Decreased [place] with Home Exercise Program 
 INTERVENTIONS PLANNED: (Benefits and precautions of physical therapy have been discussed with the patient.) 1. Balance Exercise 2. Bed Mobility 3. Family Education 4. Gait Training 5. Home Exercise Program (HEP) 6. Range of Motion (ROM) 7. Therapeutic Activites 8. Therapeutic Exercise/Strengthening 9. Transfer Training TREATMENT PLAN: Frequency/Duration: daily for duration of hospital stay Rehabilitation Potential For Stated Goals: Good RECOMMENDED REHABILITATION/EQUIPMENT: (at time of discharge pending progress): Due to the probability of continued deficits (see above) this patient will likely need continued skilled physical therapy after discharge. Equipment:  
? None at this time HISTORY:  
History of Present Injury/Illness (Reason for Referral): EXPLORATORY LAPAROTOMY, LYSIS OF ADHESIONS, SMALL BOWEL RESECTION X1, ENTEROTOMY REPAIR X3, REPAIR OF CYSTOTOMY on 4/9/19.  
 
Past Medical History/Comorbidities:  
Mr. Dave Young  has a past medical history of Family history of malignant neoplasm of gastrointestinal tract, Fecal incontinence, Former cigarette smoker, History of rectal cancer, Hypothyroid, Infection and inflammatory reaction due to other internal prosthetic devices, implants and grafts, subsequent encounter (2017), Insomnia, Osteoarthritis, hand, Personal history of colonic polyps (9/2013), Personal history of malignant neoplasm of rectum, rectosigmoid junction, and anus (9/2013), and Psychiatric disorder. He also has no past medical history of Adverse effect of anesthesia, Aneurysm ([address] Arrhythmia, Asthma, Autoimmune disease ([address] CAD (coronary artery disease), Chronic kidney disease, Chronic obstructive pulmonary disease ([address] Chronic pain, Coagulation disorder ([address] Diabetes ([address] Difficult intubation, Endocarditis, GERD (gastroesophageal reflux disease), Heart failure ([address] Hypertension, Ill-defined condition, Liver disease, Malignant hyperthermia due to anesthesia, Morbid obesity ([address] Nausea & vomiting, Other ill-defined conditions(799.89), Pseudocholinesterase deficiency, PUD (peptic ulcer disease), Rheumatic fever, Seizures ([address] Stroke ([address] Thromboembolus ([address] Unspecified adverse effect of anesthesia, or Unspecified sleep apnea. Mr. Arpita Jorgensen  has a past surgical history that includes endoscopy, colon, diagnostic (last 2/3/15); hx polypectomy; hx other surgical (10/7/2013); hx colectomy (11/2013); hx colectomy (Right, 8/2014); hx hernia repair (3/2015, 5/2016); hx gi (4/2016); hx colostomy (5/11/16); hx hernia repair; hx other surgical (Bilateral); colonoscopy thru stoma (2/12/2018); colonoscopy (N/A, 2/12/2018); and hx vascular access (Left, 4/14). Social History/Living Environment:  
Home Environment: Other (comment)(townhouse/condo) One/Two Story Residence: Two story Living Alone: Yes Support Systems: Family member(s), Friends \ neighbors Patient Expects to be Discharged to[de-identified] Other (comment)(townhouse/condo) Current DME Used/Available at Home: None Prior Level of Function/Work/Activity: 
independent Number of Personal Factors/Comorbidities that affect the Plan of Care: 1-2: MODERATE COMPLEXITY EXAMINATION:  
Most Recent Physical Functioning:  
Gross Assessment: 
AROM: Generally decreased, functional 
Strength: Generally decreased, functional 
         
  
Posture: 
Posture (WDL): Exceptions to [facility] Posture Assessment: Forward head, Rounded shoulders Balance: 
Sitting: Intact Standing: With support Bed Mobility: 
Supine to Sit: Minimum assistance Wheelchair Mobility: 
  
Transfers: 
Sit to Stand: Contact guard assistance Stand to Sit: Contact guard assistance Duration: 10 Minutes Gait: 
  
Speed/Alicia: Delayed Step Length: Left shortened;Right shortened Gait Abnormalities: Decreased step clearance Distance (ft): 1300 Feet (ft) Assistive Device: Other (comment)(IV pole) Ambulation - Level of Assistance: Contact guard assistance Interventions: Safety awareness training;Verbal cues Duration: 20 Minutes Body Structures Involved: 1. Bones 2. Joints 3. Muscles 4. Ligaments Body Functions Affected: 1. Movement Related Activities and Participation Affected: 1. Mobility Number of elements that affect the Plan of Care: 3: MODERATE COMPLEXITY CLINICAL PRESENTATION:  
Presentation: Stable and uncomplicated: LOW COMPLEXITY CLINICAL DECISION MAKIN [facility] [address] [id number] AM-PAC 6 Clicks Basic Mobility Inpatient Short Form How much difficulty does the patient currently have. .. Unable A Lot A Little None 1. Turning over in bed (including adjusting bedclothes, sheets and blankets)? ? 1   ? 2   ? 3   ? 4  
2. Sitting down on and standing up from a chair with arms ( e.g., wheelchair, bedside commode, etc.)   ? 1   ? 2   ? 3   ? 4  
3. Moving from lying on back to sitting on the side of the bed?   ? 1   ? 2   ? 3   ? 4 How much help from another person does the patient currently need. .. Total A Lot A Little None 4. Moving to and from a bed to a chair (including a wheelchair)? ? 1   ? 2   ? 3   ? 4  
5. Need to walk in hospital room?    ? 1   ? 2   ? 3   ? 4  
 6.  Climbing 3-5 steps with a railing? ? 1   ? 2   ? 3   ? 4  
© 2007, Trustees of [address] [place] [address] [address] [address], under license to [employer]. All rights reserved Score:  Initial: 18 Most Recent: X (Date: -- ) Interpretation of Tool:  Represents activities that are increasingly more difficult (i.e. Bed mobility, Transfers, Gait). Medical Necessity:    
· Patient is expected to demonstrate progress in strength, range of motion and balance ·  to decrease assistance required with theraputic exercises and functional mobility · . Reason for Services/Other Comments: 
· Patient continues to require present interventions due to patient's inability to perform theraputic exercises and functional mobility · . Use of outcome tool(s) and clinical judgement create a POC that gives a: Clear prediction of patient's progress: LOW COMPLEXITY  
  
 
 
 
TREATMENT:  
(In addition to Assessment/Re-Assessment sessions the following treatments were rendered) Pre-treatment Symptoms/Complaints:  fatigue no pain 
Pain: Initial:  
   Post Session:  0 Gait Training (20 Minutes):  Gait training to improve and/or restore physical functioning as related to mobility, strength and balance. Ambulated 1300 Feet (ft) with Contact guard assistance using a Other (comment)(IV pole) and minimal Safety awareness training;Verbal cues related to their stance phase and stride length to promote proper body alignment, promote proper body posture, promote proper body mechanics and promote proper body breathing techniques. Therapeutic Activity: (  10 Minutes ):  Therapeutic activities including Bed transfers, Chair transfers, Ambulation on level ground and standing balance  to improve mobility, strength and balance. Required minimal Safety awareness training;Verbal cues to promote static and dynamic balance in standing. Braces/Orthotics/Lines/Etc:  
· IV 
· brito catheter · nasogastric tube · O2 Device: Nasal cannula(3L) 
 Treatment/Session Assessment:   
· Response to Treatment:  Pt. Did well, general weak · Interdisciplinary Collaboration:  
o Registered Nurse 
o Rehabilitation Attendant · After treatment position/precautions:  
o Up in chair 
o Bed/Chair-wheels locked 
o Bed in low position 
o Call light within reach 
o RN notified · Compliance with Program/Exercises: Will assess as treatment progresses  no questions. · Recommendations/Intent for next treatment session: \"Next visit will focus on advancements to more challenging activities and reduction in assistance provided\". Total Treatment Duration: PT Patient Time In/Time Out Time In: 1140 Time Out: 0037 Catarina Ortiz PT

## 2019-04-10 NOTE — CDMP QUERY
Pt admitted with  Small bowel obstruction. Medical record documentation indicates a diagnosis of post-operative respiratory failure. Please clarify the cause with correlating clinical indicators to support such:              Respiratory failure following surgery which was not routinely expected and was  a   complication   of the surgical procedure              Respiratory failure was due to events unrelated to the surgical operation (Please      Specify)              Acute Pulmonary Insufficiency following surgery              Other (Please Specify)              Unable to determine The medical record reflects the following: 
   Risk Factors: Age, Cancer, Recent Surgery Clinical Indicators: Pt under went surgery on 4/3 and post op required 3L O2, pt then transitioned to RA, on 4/7 @ 2349 pt placed on 6L with an O2 sat of 91%, on 4/9 @ 2329 pt weaned to 3L O2 via NC and pts O2 dropped to 86% pt then was placed back on 6L NC with and O2 sat of 92% @ 2340 on 4/9 Treatment: supplemental oxygen Thanks, 
ROSY GuzmánN, RN, CDS Compliant Documentation Management Program 
[phone]

## 2019-04-10 NOTE — PROGRESS NOTES
Assumed care from [place], [address] [place] [address]. Pt resting quietly in bed with no acute signs of distress. Heart sounds irregular. Hypoactive bowel sounds with soft and tender abdomen. Lung sounds coarse, on 4L Hi-Flow NC. NGT to low intermittent suction. Midline incision with staples JOSE RAFAEL. Ostomy pale and moist with no output at this time. Bed locked in lowest position with call light in reach.

## 2019-04-10 NOTE — PROGRESS NOTES
Assessment completed and documented. Lung sounds diminished. Productive cough. Heart sounds irregular. Abdomen soft with hypoactive bowel sounds. Bishop in place. Incision clean/dry/intact. Patient currently resting in bed. Will continue to monitor with hourly rounding.

## 2019-04-10 NOTE — PROGRESS NOTES
[address] [facility] to notify MD of pt's c/o mucus/sore throat and request for sedatives. Awaiting call back

## 2019-04-10 NOTE — PROGRESS NOTES
Shift assessment completed via doc flow sheet. Pt A & O x 4. 4L via NC. Lung sounds diminished, non productive cough noted. HR WNL, NSR. Abdomen soft and tender. No c/o pain at this time. IVS c/d/i, no s/sx of infection/infiltration noted. NGT in place, low intermittent suction. Pt able to make needs known. No concerns at this time. Bed low and locked. Call light within reach. Will continue to monitor.

## 2019-04-10 NOTE — PROGRESS NOTES
Problem: Falls - Risk of 
Goal: *Absence of Falls Description Document Cinda Singh Fall Risk and appropriate interventions in the flowsheet. Outcome: Progressing Towards Goal 
  
Problem: Pressure Injury - Risk of 
Goal: *Prevention of pressure injury Description Document Josef Scale and appropriate interventions in the flowsheet. Outcome: Progressing Towards Goal 
  
Problem: Patient Education: Go to Patient Education Activity Goal: Patient/Family Education Outcome: Progressing Towards Goal 
  
Problem: Major Small and Large Bowel Procedure: Post-Op Day 1 Goal: Off Pathway (Use only if patient is Off Pathway) Outcome: Progressing Towards Goal 
Goal: Activity/Safety Outcome: Progressing Towards Goal 
Goal: Consults, if ordered Outcome: Progressing Towards Goal 
Goal: Diagnostic Test/Procedures Outcome: Progressing Towards Goal 
Goal: Nutrition/Diet Outcome: Progressing Towards Goal 
Goal: Discharge Planning Outcome: Progressing Towards Goal 
Goal: Medications Outcome: Progressing Towards Goal 
Goal: Respiratory Outcome: Progressing Towards Goal 
Goal: Treatments/Interventions/Procedures Outcome: Progressing Towards Goal 
Goal: Psychosocial 
Outcome: Progressing Towards Goal 
Goal: *Optimal pain control at patient's stated goal 
Outcome: Progressing Towards Goal 
Goal: *Adequate urinary output (equal to or greater than 30 milliliters/hour) Outcome: Progressing Towards Goal 
Goal: *Hemodynamically stable Outcome: Progressing Towards Goal 
Goal: *Tolerating diet Outcome: Progressing Towards Goal 
Goal: *Demonstrates progressive activity Outcome: Progressing Towards Goal 
Goal: *Lungs clear or at baseline Outcome: Progressing Towards Goal 
  
Problem: Nutrition Deficit Goal: *Optimize nutritional status Outcome: Progressing Towards Goal 
  
Problem: Infection - Risk of, Urinary Catheter-Associated Urinary Tract Infection Goal: *Absence of infection signs and symptoms Outcome: Progressing Towards Goal 
  
Problem: Patient Education: Go to Patient Education Activity Goal: Patient/Family Education Outcome: Progressing Towards Goal 
  
Problem: Patient Education: Go to Patient Education Activity Goal: Patient/Family Education Outcome: Progressing Towards Goal

## 2019-04-10 NOTE — PROGRESS NOTES
Up in chair- looks well but doesn't feel great Lungs-clearing, less sputum CV-frequent PVC 
abd-no significant bowel sounds, stoma pale pink Labs noted- now on PPN, should correct lytes Cystogram- no bladder leak Plan- Await GI function return Remove brito tomorrow morning Pt did not have respiratory failure- jsut copious secretions

## 2019-04-11 LAB
ANION GAP SERPL CALC-SCNC: 3 MMOL/L
BUN SERPL-MCNC: 23 MG/DL (ref 8–23)
CALCIUM SERPL-MCNC: 8 MG/DL (ref 8.3–10.4)
CHLORIDE SERPL-SCNC: 97 MMOL/L (ref 98–107)
CO2 SERPL-SCNC: 35 MMOL/L (ref 21–32)
CREAT SERPL-MCNC: 0.71 MG/DL (ref 0.8–1.5)
GLUCOSE SERPL-MCNC: 115 MG/DL (ref 65–100)
POTASSIUM SERPL-SCNC: 4.2 MMOL/L (ref 3.5–5.1)
SODIUM SERPL-SCNC: 135 MMOL/L (ref 136–145)

## 2019-04-11 PROCEDURE — 74011250636 HC RX REV CODE- 250/636: Performed by: SURGERY

## 2019-04-11 PROCEDURE — 77030011943

## 2019-04-11 PROCEDURE — 51798 US URINE CAPACITY MEASURE: CPT

## 2019-04-11 PROCEDURE — 74011000250 HC RX REV CODE- 250: Performed by: SURGERY

## 2019-04-11 PROCEDURE — 36415 COLL VENOUS BLD VENIPUNCTURE: CPT

## 2019-04-11 PROCEDURE — 74011000258 HC RX REV CODE- 258: Performed by: SURGERY

## 2019-04-11 PROCEDURE — 97530 THERAPEUTIC ACTIVITIES: CPT

## 2019-04-11 PROCEDURE — 80048 BASIC METABOLIC PNL TOTAL CA: CPT

## 2019-04-11 PROCEDURE — 97116 GAIT TRAINING THERAPY: CPT

## 2019-04-11 PROCEDURE — 74011250637 HC RX REV CODE- 250/637: Performed by: SURGERY

## 2019-04-11 PROCEDURE — 65270000029 HC RM PRIVATE

## 2019-04-11 RX ADMIN — VITAMIN D, TAB 1000IU (100/BT) 2000 UNITS: 25 TAB at 09:00

## 2019-04-11 RX ADMIN — CALCIUM GLUCONATE: 98 INJECTION, SOLUTION INTRAVENOUS at 18:40

## 2019-04-11 RX ADMIN — VITAMIN D, TAB 1000IU (100/BT) 2000 UNITS: 25 TAB at 22:29

## 2019-04-11 RX ADMIN — OLANZAPINE 2.5 MG: 2.5 TABLET, FILM COATED ORAL at 22:19

## 2019-04-11 RX ADMIN — CLONAZEPAM 4 MG: 1 TABLET ORAL at 22:19

## 2019-04-11 RX ADMIN — I.V. FAT EMULSION 250 ML: 20 EMULSION INTRAVENOUS at 18:43

## 2019-04-11 RX ADMIN — AMITRIPTYLINE HYDROCHLORIDE 100 MG: 50 TABLET, FILM COATED ORAL at 22:19

## 2019-04-11 RX ADMIN — Medication 1 AMPULE: at 22:19

## 2019-04-11 RX ADMIN — PIPERACILLIN AND TAZOBACTAM 3.38 G: 3; .375 INJECTION, POWDER, FOR SOLUTION INTRAVENOUS at 22:22

## 2019-04-11 RX ADMIN — PIPERACILLIN AND TAZOBACTAM 3.38 G: 3; .375 INJECTION, POWDER, FOR SOLUTION INTRAVENOUS at 06:42

## 2019-04-11 RX ADMIN — LEVOTHYROXINE SODIUM 125 MCG: 125 TABLET ORAL at 06:42

## 2019-04-11 RX ADMIN — METOCLOPRAMIDE 10 MG: 5 INJECTION, SOLUTION INTRAMUSCULAR; INTRAVENOUS at 12:42

## 2019-04-11 RX ADMIN — CLONAZEPAM 2 MG: 1 TABLET ORAL at 02:22

## 2019-04-11 RX ADMIN — METOCLOPRAMIDE 10 MG: 5 INJECTION, SOLUTION INTRAMUSCULAR; INTRAVENOUS at 00:57

## 2019-04-11 RX ADMIN — METOCLOPRAMIDE 10 MG: 5 INJECTION, SOLUTION INTRAMUSCULAR; INTRAVENOUS at 17:34

## 2019-04-11 RX ADMIN — METOCLOPRAMIDE 10 MG: 5 INJECTION, SOLUTION INTRAMUSCULAR; INTRAVENOUS at 06:42

## 2019-04-11 RX ADMIN — OLANZAPINE 5 MG: 2.5 TABLET, FILM COATED ORAL at 02:23

## 2019-04-11 RX ADMIN — Medication 1 AMPULE: at 09:00

## 2019-04-11 NOTE — PROGRESS NOTES
Required I&O cath earlier due to incomplete void No flatus, but tolerated NG clamped x 3 hours 
abd soft, scaphoid. Much improved, but still hypoactive BS Plan- 
Clamp NGT tonight- see if we can force GI function a little Hopefully will not require brito replacement

## 2019-04-11 NOTE — PROGRESS NOTES
Nutrition follow-up: 
Consult received for TPN management per nutrition support protocols/ Dr. Nicole Lopez 4/09. Reason for initial assessment: Length of stay Assessment: DIET NPO With Paty and Brenna, With Sips of Clear Fluids, Except Meds TPN ADULT - dextrose 5% amino acid 4.25% Food/Nutrition Patient History:  Pt presented with 1 week history of abdominal bloating and pain, decreased colostomy output. Past medical history notable for rectal cancer, ileostomy, colostomy in 2014. Central line access : Plan for PICC line placement 4/09. PICC team unable to place PICC. POD 8 surgery/lysis of adhesions, small bowel resection. NGT-LIS with significant output-950 ml so far today; NGT clamped late morning per MD order and RN to check residuals in 3 hours past clamping. Abdomen: soft, tender, hypoactive bowel sounds. No gas/liquid per ostomy. Pt states \"I feel lousy\". Pertinent Medications:  Zosyn, Reglan Pertinent Labs: Hyponatremia 135, glucose 115 mg/dl. Anthropometrics:Height: 5' 8\" (172.7 cm),  Weight: 63.5 kg (140 lb), Weight Source: Patient stated, Body mass index is 21.29 kg/m². BMI class of underweight for Older American Male. BMI recommended (23-30) 4/09: Bed weight 62.7 kg Macronutrient needs: EER:  1329-9148 kcal /day (30-35 kcal/kg BW) EPR:  76-89 grams protein/day (1.2-1.4 grams/kg BW) GFR >60 Max CHO:  366 grams/day (4mg/kg BW/min) Fluid:  1ml/kcal 
Intake/Comparative Standards: Current NPO status does not meet kcal or protein needs Intervention:  
Meals and snacks: NPO; await return of GI function Nutritional Supplement Therapy: Electrolyte replacement per nutritional support protocols are active on MAR. PN: 1. Continue  PPN: 5%DEX/ 4.25%AA at 125 ml/hr with 250 ml 20% Lipids daily- provides 1520 kcal/d (80% of needs), 127.5 grams of protein/d (~143% of needs; provides 2 gm/kg/BW), 150 grams of CHO/d (does not exceed maximum CHO load) and 3250 ml of total volume/d ( >100% of needs). Additives: Sodium 45 mEq/L; K 40 mEq/L, Ca 4.5 mEq/L, Mg 5 mEq/L and Phos 15 mEq/L   
2. Add TPN labs, POC Glucoses/SSI if Glucose > 180 mg/dl on AM BMP   Coordination of Nutrition Care:  Bijal Girard RN,  Angela Card, PharmD Discharge nutrition plan: Too soon to determine Farshad Felder, MPH, [facility], [place] 
[phone]

## 2019-04-11 NOTE — PROGRESS NOTES
Shift assessment complete. Pt resting quietly in bed. No signs of acute distress. Resp even and unlabored. NG tube to left nare to intermittent suction. TPN infusing. Midline incision c/d/i. Call light within reach. Pt instructed to call for assistance.

## 2019-04-11 NOTE — PROGRESS NOTES
Problem: Pressure Injury - Risk of 
Goal: *Prevention of pressure injury Description Document Josef Scale and appropriate interventions in the flowsheet. Outcome: Progressing Towards Goal 
  
Problem: Ostomy Care Goal: *Patient pouching appliance will fit properly and maintain integrity at least three to five days Description Infection control procedures (eg, clean dressings, clean gloves, hand washing, precautions to isolate wound from contamination, sterile instruments used for wound debridement) should be implemented.  
Outcome: Progressing Towards Goal

## 2019-04-11 NOTE — PROGRESS NOTES
Problem: Falls - Risk of 
Goal: *Absence of Falls Description Document Cheko Presmartin Fall Risk and appropriate interventions in the flowsheet. Outcome: Progressing Towards Goal 
  
Problem: Pressure Injury - Risk of 
Goal: *Prevention of pressure injury Description Document Josef Scale and appropriate interventions in the flowsheet. Outcome: Progressing Towards Goal 
  
Problem: Ostomy Care Goal: *Patient pouching appliance will fit properly and maintain integrity at least three to five days Description Infection control procedures (eg, clean dressings, clean gloves, hand washing, precautions to isolate wound from contamination, sterile instruments used for wound debridement) should be implemented.  
Outcome: Progressing Towards Goal

## 2019-04-11 NOTE — PROGRESS NOTES
Problem: Mobility Impaired (Adult and Pediatric) Goal: *Acute Goals and Plan of Care (Insert Text) Description LTG: 
(1.)Mr. Marroquin will move from supine to sit and sit to supine  in bed with SUPERVISION within 7 treatment day(s). (2.)Mr. Marroquin will transfer from bed to chair and chair to bed with SUPERVISION using the least restrictive device within 7 treatment day(s). (3.)Mr. Marroquin will ambulate with SUPERVISION for 650 feet with the least restrictive device within 7 treatment day(s). (4)Mr. Marroquin will perform HEP with cues and assistance to increase safety on her feet in 7 days. (5)Mr. Marroquin will go up a flight of steps CGA with rail in 7 days. ________________________________________________________________________________________________ Outcome: Progressing Towards Goal 
  
PHYSICAL THERAPY: Daily Note and PM 4/11/2019 INPATIENT: PT Visit Days : 3 [place] / Product Type: Medicare /   
  
NAME/AGE/GENDER: Monique Che is a 74 y.o. male PRIMARY DIAGNOSIS: Small bowel obstruction ([facility] [address] [K56.609] Small bowel obstruction ([facility] [address] Small bowel obstruction ([facility] [address] Procedure(s) (LRB): 
EXPLORATORY LAPAROTOMY, LYSIS OF ADHESIONS, SMALL BOWEL RESECTION X1, ENTEROTOMY REPAIR X3, REPAIR OF CYSTOTOMY (N/A) 8 Days Post-Op ICD-10: Treatment Diagnosis:  
 · Generalized Muscle Weakness (M62.81) · Other abnormalities of gait and mobility (R26.89) Precaution/Allergies: 
Patient has no known allergies. ASSESSMENT:  
Mr. Kelly Gabriel presents with general weakness and decreased functional mobility s/p EXPLORATORY LAPAROTOMY, LYSIS OF ADHESIONS, SMALL BOWEL RESECTION X1, ENTEROTOMY REPAIR X3, REPAIR OF CYSTOTOMY on 4/3/19. He lives alone and was independent prior to hospitalization. He plans to go home. Pt. Reporting he feels about the same.   He ambulated the floor of the hospital this am.  He was able to get in and out of bed CGA for the first time.  No pain complaints, just general weakness. He was on 4 L O2 and sats in mid 90's after gait, 100 before gait, no SOB. He uses the IV pole for support and balance. He stood in bathroom to attempt to urinate for several minues but was unable, no loss of balance with standing. Pt. Left supine. This section established at most recent assessment PROBLEM LIST (Impairments causing functional limitations): 1. Decreased Strength 2. Decreased ADL/Functional Activities 3. Decreased Transfer Abilities 4. Decreased Ambulation Ability/Technique 5. Decreased Balance 6. Decreased Activity Tolerance 7. Increased Fatigue 8. Increased Shortness of Breath 9. Decreased Flexibility/Joint Mobility 10. Decreased [place] with Home Exercise Program 
 INTERVENTIONS PLANNED: (Benefits and precautions of physical therapy have been discussed with the patient.) 1. Balance Exercise 2. Bed Mobility 3. Family Education 4. Gait Training 5. Home Exercise Program (HEP) 6. Range of Motion (ROM) 7. Therapeutic Activites 8. Therapeutic Exercise/Strengthening 9. Transfer Training TREATMENT PLAN: Frequency/Duration: daily for duration of hospital stay Rehabilitation Potential For Stated Goals: Good RECOMMENDED REHABILITATION/EQUIPMENT: (at time of discharge pending progress): Due to the probability of continued deficits (see above) this patient will likely need continued skilled physical therapy after discharge. Equipment:  
? None at this time HISTORY:  
History of Present Injury/Illness (Reason for Referral): EXPLORATORY LAPAROTOMY, LYSIS OF ADHESIONS, SMALL BOWEL RESECTION X1, ENTEROTOMY REPAIR X3, REPAIR OF CYSTOTOMY on 4/9/19.  
 
Past Medical History/Comorbidities:  
Mr. Daisy Fonseca  has a past medical history of Family history of malignant neoplasm of gastrointestinal tract, Fecal incontinence, Former cigarette smoker, History of rectal cancer, Hypothyroid, Infection and inflammatory reaction due to other internal prosthetic devices, implants and grafts, subsequent encounter (2017), Insomnia, Osteoarthritis, hand, Personal history of colonic polyps (9/2013), Personal history of malignant neoplasm of rectum, rectosigmoid junction, and anus (9/2013), and Psychiatric disorder. He also has no past medical history of Adverse effect of anesthesia, Aneurysm ([address] Arrhythmia, Asthma, Autoimmune disease ([address] CAD (coronary artery disease), Chronic kidney disease, Chronic obstructive pulmonary disease ([address] Chronic pain, Coagulation disorder ([address] Diabetes ([address] Difficult intubation, Endocarditis, GERD (gastroesophageal reflux disease), Heart failure ([address] Hypertension, Ill-defined condition, Liver disease, Malignant hyperthermia due to anesthesia, Morbid obesity ([address] Nausea & vomiting, Other ill-defined conditions(799.89), Pseudocholinesterase deficiency, PUD (peptic ulcer disease), Rheumatic fever, Seizures ([address] Stroke ([address] Thromboembolus ([address] Unspecified adverse effect of anesthesia, or Unspecified sleep apnea. Mr. Brenda Peacock  has a past surgical history that includes endoscopy, colon, diagnostic (last 2/3/15); hx polypectomy; hx other surgical (10/7/2013); hx colectomy (11/2013); hx colectomy (Right, 8/2014); hx hernia repair (3/2015, 5/2016); hx gi (4/2016); hx colostomy (5/11/16); hx hernia repair; hx other surgical (Bilateral); colonoscopy thru stoma (2/12/2018); colonoscopy (N/A, 2/12/2018); and hx vascular access (Left, 4/14). Social History/Living Environment:  
Home Environment: Other (comment)(townhouse/condo) One/Two Story Residence: Two story Living Alone: Yes Support Systems: Family member(s), Friends \ neighbors Patient Expects to be Discharged to[de-identified] Other (comment)(townhouse/condo) Current DME Used/Available at Home: None Prior Level of Function/Work/Activity: 
independent Number of Personal Factors/Comorbidities that affect the Plan of Care: 1-2: MODERATE COMPLEXITY EXAMINATION:  
Most Recent Physical Functioning:  
Gross Assessment: 
AROM: Generally decreased, functional 
Strength: Generally decreased, functional 
         
  
Posture: 
Posture (WDL): Exceptions to [facility] Posture Assessment: Forward head, Rounded shoulders Balance: 
Sitting: Intact Standing: With support Bed Mobility: 
Supine to Sit: Contact guard assistance Sit to Supine: Contact guard assistance Wheelchair Mobility: 
  
Transfers: 
Sit to Stand: Contact guard assistance Stand to Sit: Contact guard assistance Duration: 10 Minutes Gait: 
  
Speed/Alicia: Delayed Step Length: Left shortened;Right shortened Gait Abnormalities: Decreased step clearance Distance (ft): 1300 Feet (ft) Assistive Device: Other (comment)(IV pole for support) Ambulation - Level of Assistance: Contact guard assistance;Stand-by assistance Interventions: Safety awareness training;Verbal cues Duration: 20 Minutes Body Structures Involved: 1. Bones 2. Joints 3. Muscles 4. Ligaments Body Functions Affected: 1. Movement Related Activities and Participation Affected: 1. Mobility Number of elements that affect the Plan of Care: 3: MODERATE COMPLEXITY CLINICAL PRESENTATION:  
Presentation: Stable and uncomplicated: LOW COMPLEXITY CLINICAL DECISION MAKING:  
[employer] -PAC 6 Clicks Basic Mobility Inpatient Short Form How much difficulty does the patient currently have. .. Unable A Lot A Little None 1. Turning over in bed (including adjusting bedclothes, sheets and blankets)? ? 1   ? 2   ? 3   ? 4  
2. Sitting down on and standing up from a chair with arms ( e.g., wheelchair, bedside commode, etc.)   ? 1   ? 2   ? 3   ? 4  
3. Moving from lying on back to sitting on the side of the bed?   ? 1   ? 2   ? 3   ? 4 How much help from another person does the patient currently need. .. Total A Lot A Little None 4. Moving to and from a bed to a chair (including a wheelchair)?    ? 1   ? 2   ? 3   ? 4  
 5.  Need to walk in hospital room? ? 1   ? 2   ? 3   ? 4  
6. Climbing 3-5 steps with a railing? ? 1   ? 2   ? 3   ? 4  
© 2007, Trustees of [address] [place] [address] [address] [address], under license to [employer]. All rights reserved Score:  Initial: 18 Most Recent: X (Date: -- ) Interpretation of Tool:  Represents activities that are increasingly more difficult (i.e. Bed mobility, Transfers, Gait). Medical Necessity:    
· Patient is expected to demonstrate progress in strength, range of motion and balance ·  to decrease assistance required with theraputic exercises and functional mobility · . Reason for Services/Other Comments: 
· Patient continues to require present interventions due to patient's inability to perform theraputic exercises and functional mobility · . Use of outcome tool(s) and clinical judgement create a POC that gives a: Clear prediction of patient's progress: LOW COMPLEXITY  
  
 
 
 
TREATMENT:  
(In addition to Assessment/Re-Assessment sessions the following treatments were rendered) Pre-treatment Symptoms/Complaints:  fatigue no pain 
Pain: Initial:  
   Post Session:  0 Gait Training (20 Minutes):  Gait training to improve and/or restore physical functioning as related to mobility, strength and balance. Ambulated 1300 Feet (ft) with Contact guard assistance;Stand-by assistance using a Other (comment)(IV pole for support) and minimal Safety awareness training;Verbal cues related to their stance phase and stride length to promote proper body alignment, promote proper body posture, promote proper body mechanics and promote proper body breathing techniques. Therapeutic Activity: (  10 Minutes ):  Therapeutic activities including Bed transfers, Chair transfers, Ambulation on level ground and standing balance  to improve mobility, strength and balance. Required minimal Safety awareness training;Verbal cues to promote static and dynamic balance in standing. Braces/Orthotics/Lines/Etc:  
· IV 
· nasogastric tube · O2 Device: Nasal cannula(3L) Treatment/Session Assessment:   
· Response to Treatment:  Pt. Did well, general weakness · Interdisciplinary Collaboration:  
o Registered Nurse 
o Rehabilitation Attendant · After treatment position/precautions:  
o Supine in bed 
o Bed/Chair-wheels locked 
o Bed in low position 
o Call light within reach 
o RN notified · Compliance with Program/Exercises: Will assess as treatment progresses  no questions. · Recommendations/Intent for next treatment session: \"Next visit will focus on advancements to more challenging activities and reduction in assistance provided\". Total Treatment Duration: PT Patient Time In/Time Out Time In: 1115 Time Out: 1145 Kirti Nesbitt PT

## 2019-04-11 NOTE — PROGRESS NOTES
Shift assessment completed via doc flow sheet. Pt A & O x 4. Lung sounds coarse, on 4L Hi-Flow NC. NGT to low intermittent suction. Midline incision with staples JOSE RAFAEL. Ostomy pale and moist. Abdomen soft and tender. No c/o pain at this time. IVS c/d/i, no s/sx of infection/infiltration noted. Pt able to make needs known. No concerns at this time. Bed low and locked. Call light within reach. Will continue to monitor.

## 2019-04-12 ENCOUNTER — PATIENT OUTREACH (OUTPATIENT)
Dept: CASE MANAGEMENT | Age: 74
End: 2019-04-12

## 2019-04-12 LAB
ANION GAP SERPL CALC-SCNC: 5 MMOL/L
BUN SERPL-MCNC: 25 MG/DL (ref 8–23)
CALCIUM SERPL-MCNC: 8.2 MG/DL (ref 8.3–10.4)
CHLORIDE SERPL-SCNC: 96 MMOL/L (ref 98–107)
CO2 SERPL-SCNC: 32 MMOL/L (ref 21–32)
CREAT SERPL-MCNC: 0.74 MG/DL (ref 0.8–1.5)
GLUCOSE BLD STRIP.AUTO-MCNC: 100 MG/DL (ref 65–100)
GLUCOSE BLD STRIP.AUTO-MCNC: 101 MG/DL (ref 65–100)
GLUCOSE SERPL-MCNC: 118 MG/DL (ref 65–100)
MAGNESIUM SERPL-MCNC: 2 MG/DL (ref 1.8–2.4)
PHOSPHATE SERPL-MCNC: 3.8 MG/DL (ref 2.3–3.7)
POTASSIUM SERPL-SCNC: 4.3 MMOL/L (ref 3.5–5.1)
SODIUM SERPL-SCNC: 133 MMOL/L (ref 136–145)

## 2019-04-12 PROCEDURE — 36415 COLL VENOUS BLD VENIPUNCTURE: CPT

## 2019-04-12 PROCEDURE — 74011000250 HC RX REV CODE- 250: Performed by: SURGERY

## 2019-04-12 PROCEDURE — 82962 GLUCOSE BLOOD TEST: CPT

## 2019-04-12 PROCEDURE — 97116 GAIT TRAINING THERAPY: CPT

## 2019-04-12 PROCEDURE — 65270000029 HC RM PRIVATE

## 2019-04-12 PROCEDURE — 80048 BASIC METABOLIC PNL TOTAL CA: CPT

## 2019-04-12 PROCEDURE — 83735 ASSAY OF MAGNESIUM: CPT

## 2019-04-12 PROCEDURE — 84100 ASSAY OF PHOSPHORUS: CPT

## 2019-04-12 PROCEDURE — 74011250637 HC RX REV CODE- 250/637: Performed by: SURGERY

## 2019-04-12 PROCEDURE — 74011250636 HC RX REV CODE- 250/636: Performed by: SURGERY

## 2019-04-12 PROCEDURE — 74011000258 HC RX REV CODE- 258: Performed by: SURGERY

## 2019-04-12 PROCEDURE — 97530 THERAPEUTIC ACTIVITIES: CPT

## 2019-04-12 RX ORDER — LORAZEPAM 2 MG/ML
1 INJECTION INTRAMUSCULAR
Status: DISCONTINUED | OUTPATIENT
Start: 2019-04-12 | End: 2019-05-03 | Stop reason: HOSPADM

## 2019-04-12 RX ORDER — ZOLPIDEM TARTRATE 5 MG/1
5 TABLET ORAL
Status: DISCONTINUED | OUTPATIENT
Start: 2019-04-12 | End: 2019-05-03 | Stop reason: HOSPADM

## 2019-04-12 RX ADMIN — LORAZEPAM 1 MG: 2 INJECTION INTRAMUSCULAR; INTRAVENOUS at 16:45

## 2019-04-12 RX ADMIN — VITAMIN D, TAB 1000IU (100/BT) 2000 UNITS: 25 TAB at 18:00

## 2019-04-12 RX ADMIN — CLONAZEPAM 4 MG: 1 TABLET ORAL at 23:29

## 2019-04-12 RX ADMIN — I.V. FAT EMULSION 250 ML: 20 EMULSION INTRAVENOUS at 19:36

## 2019-04-12 RX ADMIN — CLONAZEPAM 2 MG: 1 TABLET ORAL at 01:57

## 2019-04-12 RX ADMIN — VITAMIN D, TAB 1000IU (100/BT) 2000 UNITS: 25 TAB at 09:58

## 2019-04-12 RX ADMIN — Medication 1 AMPULE: at 23:28

## 2019-04-12 RX ADMIN — METOCLOPRAMIDE 10 MG: 5 INJECTION, SOLUTION INTRAMUSCULAR; INTRAVENOUS at 06:07

## 2019-04-12 RX ADMIN — PIPERACILLIN AND TAZOBACTAM 3.38 G: 3; .375 INJECTION, POWDER, FOR SOLUTION INTRAVENOUS at 16:36

## 2019-04-12 RX ADMIN — OLANZAPINE 5 MG: 2.5 TABLET, FILM COATED ORAL at 01:57

## 2019-04-12 RX ADMIN — OLANZAPINE 2.5 MG: 2.5 TABLET, FILM COATED ORAL at 23:29

## 2019-04-12 RX ADMIN — Medication 1 AMPULE: at 09:58

## 2019-04-12 RX ADMIN — CALCIUM GLUCONATE: 98 INJECTION, SOLUTION INTRAVENOUS at 19:36

## 2019-04-12 RX ADMIN — METOCLOPRAMIDE 10 MG: 5 INJECTION, SOLUTION INTRAMUSCULAR; INTRAVENOUS at 00:43

## 2019-04-12 RX ADMIN — LEVOTHYROXINE SODIUM 125 MCG: 125 TABLET ORAL at 06:09

## 2019-04-12 RX ADMIN — PIPERACILLIN AND TAZOBACTAM 3.38 G: 3; .375 INJECTION, POWDER, FOR SOLUTION INTRAVENOUS at 09:58

## 2019-04-12 RX ADMIN — AMITRIPTYLINE HYDROCHLORIDE 100 MG: 50 TABLET, FILM COATED ORAL at 23:29

## 2019-04-12 RX ADMIN — METOCLOPRAMIDE 10 MG: 5 INJECTION, SOLUTION INTRAMUSCULAR; INTRAVENOUS at 12:00

## 2019-04-12 NOTE — PROGRESS NOTES
Problem: Mobility Impaired (Adult and Pediatric) Goal: *Acute Goals and Plan of Care (Insert Text) Description LTG: 
(1.)Mr. Marroquin will move from supine to sit and sit to supine  in bed with SUPERVISION within 7 treatment day(s). (2.)Mr. Marroquin will transfer from bed to chair and chair to bed with SUPERVISION using the least restrictive device within 7 treatment day(s). (3.)Mr. Marroquin will ambulate with SUPERVISION for 650 feet with the least restrictive device within 7 treatment day(s). (4)Mr. Marroquin will perform HEP with cues and assistance to increase safety on her feet in 7 days. (5)Mr. Marroquin will go up a flight of steps CGA with rail in 7 days. ________________________________________________________________________________________________ Outcome: Progressing Towards Goal 
  
PHYSICAL THERAPY: Daily Note and PM 4/12/2019 INPATIENT: PT Visit Days : 4 [place] / Product Type: Medicare /   
  
NAME/AGE/GENDER: Dolores Robertson is a 74 y.o. male PRIMARY DIAGNOSIS: Small bowel obstruction ([facility] [address] [K56.609] Small bowel obstruction ([facility] [address] Small bowel obstruction ([facility] [address] Procedure(s) (LRB): 
EXPLORATORY LAPAROTOMY, LYSIS OF ADHESIONS, SMALL BOWEL RESECTION X1, ENTEROTOMY REPAIR X3, REPAIR OF CYSTOTOMY (N/A) 9 Days Post-Op ICD-10: Treatment Diagnosis:  
 · Generalized Muscle Weakness (M62.81) · Other abnormalities of gait and mobility (R26.89) Precaution/Allergies: 
Patient has no known allergies. ASSESSMENT:  
Mr. Grazyna Sharp presents with general weakness and decreased functional mobility s/p EXPLORATORY LAPAROTOMY, LYSIS OF ADHESIONS, SMALL BOWEL RESECTION X1, ENTEROTOMY REPAIR X3, REPAIR OF CYSTOTOMY on 4/3/19. He lives alone and was independent prior to hospitalization. He plans to go home. Pt. Reports he feels the same. He transfers and ambulates CGA/SBA. He used IV pole for support with gait.  Some unsteadiness on his feet but generally steady but all movements are slow. Gait speed is slow. He ambulated on 4 liters O2, sats 96% after gait with no SOB. He stood some for balance while lines adjusted. He sits unassisted. This section established at most recent assessment PROBLEM LIST (Impairments causing functional limitations): 1. Decreased Strength 2. Decreased ADL/Functional Activities 3. Decreased Transfer Abilities 4. Decreased Ambulation Ability/Technique 5. Decreased Balance 6. Decreased Activity Tolerance 7. Increased Fatigue 8. Increased Shortness of Breath 9. Decreased Flexibility/Joint Mobility 10. Decreased [place] with Home Exercise Program 
 INTERVENTIONS PLANNED: (Benefits and precautions of physical therapy have been discussed with the patient.) 1. Balance Exercise 2. Bed Mobility 3. Family Education 4. Gait Training 5. Home Exercise Program (HEP) 6. Range of Motion (ROM) 7. Therapeutic Activites 8. Therapeutic Exercise/Strengthening 9. Transfer Training TREATMENT PLAN: Frequency/Duration: daily for duration of hospital stay Rehabilitation Potential For Stated Goals: Good RECOMMENDED REHABILITATION/EQUIPMENT: (at time of discharge pending progress): Due to the probability of continued deficits (see above) this patient will likely need continued skilled physical therapy after discharge. Equipment:  
? None at this time HISTORY:  
History of Present Injury/Illness (Reason for Referral): EXPLORATORY LAPAROTOMY, LYSIS OF ADHESIONS, SMALL BOWEL RESECTION X1, ENTEROTOMY REPAIR X3, REPAIR OF CYSTOTOMY on 4/9/19.  
 
Past Medical History/Comorbidities:  
Mr. Arpita Jorgensen  has a past medical history of Family history of malignant neoplasm of gastrointestinal tract, Fecal incontinence, Former cigarette smoker, History of rectal cancer, Hypothyroid, Infection and inflammatory reaction due to other internal prosthetic devices, implants and grafts, subsequent encounter (2017), Insomnia, Osteoarthritis, hand, Personal history of colonic polyps (9/2013), Personal history of malignant neoplasm of rectum, rectosigmoid junction, and anus (9/2013), and Psychiatric disorder. He also has no past medical history of Adverse effect of anesthesia, Aneurysm ([address] Arrhythmia, Asthma, Autoimmune disease ([address] CAD (coronary artery disease), Chronic kidney disease, Chronic obstructive pulmonary disease ([address] Chronic pain, Coagulation disorder ([address] Diabetes ([address] Difficult intubation, Endocarditis, GERD (gastroesophageal reflux disease), Heart failure ([address] Hypertension, Ill-defined condition, Liver disease, Malignant hyperthermia due to anesthesia, Morbid obesity ([address] Nausea & vomiting, Other ill-defined conditions(799.89), Pseudocholinesterase deficiency, PUD (peptic ulcer disease), Rheumatic fever, Seizures ([address] Stroke ([address] Thromboembolus ([address] Unspecified adverse effect of anesthesia, or Unspecified sleep apnea. Mr. Slim Carney  has a past surgical history that includes endoscopy, colon, diagnostic (last 2/3/15); hx polypectomy; hx other surgical (10/7/2013); hx colectomy (11/2013); hx colectomy (Right, 8/2014); hx hernia repair (3/2015, 5/2016); hx gi (4/2016); hx colostomy (5/11/16); hx hernia repair; hx other surgical (Bilateral); colonoscopy thru stoma (2/12/2018); colonoscopy (N/A, 2/12/2018); and hx vascular access (Left, 4/14). Social History/Living Environment:  
Home Environment: Other (comment[place]/condo) One/Two Story Residence: Two story Living Alone: Yes Support Systems: Family member(s), Friends \ neighbors Patient Expects to be Discharged to[de-identified] Other (comment)(townhouse/condo) Current DME Used/Available at Home: None Prior Level of Function/Work/Activity: 
independent Number of Personal Factors/Comorbidities that affect the Plan of Care: 1-2: MODERATE COMPLEXITY EXAMINATION:  
Most Recent Physical Functioning:  
Gross Assessment: AROM: Generally decreased, functional 
Strength: Generally decreased, functional 
         
  
Posture: 
Posture (WDL): Exceptions to [facility] Posture Assessment: Forward head, Rounded shoulders Balance: 
Sitting: Intact Standing: With support Bed Mobility: 
Supine to Sit: Contact guard assistance Sit to Supine: Contact guard assistance Wheelchair Mobility: 
  
Transfers: 
Sit to Stand: Contact guard assistance Stand to Sit: Contact guard assistance Duration: 10 Minutes Gait: 
  
Speed/Alicia: Delayed Step Length: Left shortened;Right shortened Gait Abnormalities: Decreased step clearance Distance (ft): 1300 Feet (ft) Assistive Device: Other (comment)(IV pole for support) Ambulation - Level of Assistance: Contact guard assistance;Stand-by assistance Interventions: Safety awareness training;Verbal cues Duration: 20 Minutes Body Structures Involved: 1. Bones 2. Joints 3. Muscles 4. Ligaments Body Functions Affected: 1. Movement Related Activities and Participation Affected: 1. Mobility Number of elements that affect the Plan of Care: 3: MODERATE COMPLEXITY CLINICAL PRESENTATION:  
Presentation: Stable and uncomplicated: LOW COMPLEXITY CLINICAL DECISION MAKIN[address] [place] [address] [address] [id number] AM-PAC 6 Clicks Basic Mobility Inpatient Short Form How much difficulty does the patient currently have. .. Unable A Lot A Little None 1. Turning over in bed (including adjusting bedclothes, sheets and blankets)? ? 1   ? 2   ? 3   ? 4  
2. Sitting down on and standing up from a chair with arms ( e.g., wheelchair, bedside commode, etc.)   ? 1   ? 2   ? 3   ? 4  
3. Moving from lying on back to sitting on the side of the bed?   ? 1   ? 2   ? 3   ? 4 How much help from another person does the patient currently need. .. Total A Lot A Little None 4. Moving to and from a bed to a chair (including a wheelchair)? ? 1   ? 2   ? 3   ? 4  
5. Need to walk in hospital room?    ? 1   ? 2   ? 3   ? 4  
 6.  Climbing 3-5 steps with a railing? ? 1   ? 2   ? 3   ? 4  
© 2007, Trustees of [address] [place] [address] [address] [address], under license to [employer]. All rights reserved Score:  Initial: 18 Most Recent: X (Date: -- ) Interpretation of Tool:  Represents activities that are increasingly more difficult (i.e. Bed mobility, Transfers, Gait). Medical Necessity:    
· Patient is expected to demonstrate progress in strength, range of motion and balance ·  to decrease assistance required with theraputic exercises and functional mobility · . Reason for Services/Other Comments: 
· Patient continues to require present interventions due to patient's inability to perform theraputic exercises and functional mobility · . Use of outcome tool(s) and clinical judgement create a POC that gives a: Clear prediction of patient's progress: LOW COMPLEXITY  
  
 
 
 
TREATMENT:  
(In addition to Assessment/Re-Assessment sessions the following treatments were rendered) Pre-treatment Symptoms/Complaints:  fatigue no pain 
Pain: Initial:  
   Post Session:  0 Gait Training (20 Minutes):  Gait training to improve and/or restore physical functioning as related to mobility, strength and balance. Ambulated 1300 Feet (ft) with Contact guard assistance;Stand-by assistance using a Other (comment)(IV pole for support) and minimal Safety awareness training;Verbal cues related to their stance phase and stride length to promote proper body alignment, promote proper body posture, promote proper body mechanics and promote proper body breathing techniques. Therapeutic Activity: (  10 Minutes ):  Therapeutic activities including Bed transfers, Chair transfers, Ambulation on level ground and standing balance  to improve mobility, strength and balance. Required minimal Safety awareness training;Verbal cues to promote static and dynamic balance in standing. Braces/Orthotics/Lines/Etc:  
· IV 
· nasogastric tube · O2 Device: Nasal cannula(3L) Treatment/Session Assessment:   
· Response to Treatment:  Pt. Did fine, general weakness, fatigue after gait · Interdisciplinary Collaboration:  
o Registered Nurse · After treatment position/precautions:  
o Supine in bed 
o Bed/Chair-wheels locked 
o Bed in low position 
o Call light within reach 
o RN notified · Compliance with Program/Exercises: Will assess as treatment progresses  no questions. · Recommendations/Intent for next treatment session: \"Next visit will focus on advancements to more challenging activities and reduction in assistance provided\". Total Treatment Duration: PT Patient Time In/Time Out Time In: 6272 Time Out: 1335 Malika Ibrahim, PT

## 2019-04-12 NOTE — PROGRESS NOTES
Patient has refused second IV access after multiple attempts from staff. Patient wants to wait. Patient currently has ABX infusing, will continue to infuse TPN.

## 2019-04-12 NOTE — PROGRESS NOTES
Patient resting in bed. NGT - LIS w/ brown output. Respirations present, non-labored. 4 LPM High flow oxygen. Midline incision to abdomen w/ staples - CDI. Ostomy with scant brown stool. PIV infusing w;/o difficulty - TPN/LIPIDS. Continuous pulse oximetry in place. Will continue to monitor.

## 2019-04-12 NOTE — PROGRESS NOTES
Shift assessment completed and charted in flow sheets. Patient with clamped NG tube to left nare. Denies nausea. Midline incision with staples intact. Ostomy with small amount of liquid stool present. New IV placed to left inner forearm for antibiotic infusion. Patient is voiding adequate amounts of urine in the urinal.  
Currently on TPN, will check blood sugars Q6 hours.

## 2019-04-12 NOTE — PROGRESS NOTES
Ambulated in hallway two laps with pt. Pt tolerated well. Returned to bed, oxygen and SCDs in place. IV infusing. No needs voiced at this time. Safety measures in place, call light within reach.

## 2019-04-12 NOTE — PROGRESS NOTES
Nutrition follow-up: 
Consult received for TPN management per nutrition support protocols/ Dr. Cordell Olmstead 4/09. Reason for initial assessment: Length of stay Assessment: DIET NPO With [employer], With Sips of Clear Fluids, Except Meds TPN ADULT - dextrose 5% amino acid 4.25% at 125 ml/hr , 250 ml 20% lipids daily Food/Nutrition Patient History:  Pt presented with 1 week history of abdominal bloating and pain, decreased colostomy output. Past medical history notable for rectal cancer, ileostomy, colostomy in 2014. Central line access : Plan for PICC line placement 4/09. PICC team unable to place PICC. POD 8 surgery/lysis of adhesions, small bowel resection. NGT-LIS: clamped-4/11; no nausea per patient. Abdomen: soft, tender, hypoactive bowel sounds. Ostomy with scant brown stool on 4/11. Pertinent Medications:  Zosyn, Reglan Pertinent Labs: Hyponatremia 133, glucose 118 mg/dl. Hyperphosphatemia 3.8 Anthropometrics:Height: 5' 8\" (172.7 cm),  Weight: 63.5 kg (140 lb), Weight Source: Patient stated, Body mass index is 21.29 kg/m². BMI class of underweight for Older American Male. BMI recommended (23-30) 4/10: Bed weight 62.6 kg Macronutrient needs: EER:  0644-0847 kcal /day (30-35 kcal/kg BW) EPR:  75-94 grams protein/day (1.2-1.5 grams/kg BW) GFR >60 (adjusted 4/12) Max CHO:  366 grams/day (4mg/kg BW/min) Fluid:  1ml/kcal 
Intake/Comparative Standards: Current NPO status does not meet kcal or protein needs Intervention:  
Meals and snacks: NPO; await return of GI function Nutritional Supplement Therapy: Electrolyte replacement per nutritional support protocols are active on MAR. PN: 1. Continue  PPN: 5%DEX/ 4.25%AA at 125 ml/hr with 250 ml 20% Lipids daily- provides 1520 kcal/d (80% of needs), 127.5 grams of protein/d (~136% of needs; provides 2 gm/kg/BW), 150 grams of CHO/d (does not exceed maximum CHO load) and 3250 ml of total volume/d ( >100% of needs). Additives: Sodium 45 mEq/L; K 40 mEq/L, Ca 4.5 mEq/L, Mg 5 mEq/L and Phos 15 mEq/L   
2. Add TPN labs, POC Glucoses/SSI if Glucose > 180 mg/dl on AM BMP   Coordination of Nutrition Care:  Nessa Goss, PharmD Discharge nutrition plan: Too soon to determine Grecia Pinedo, MPH, [address] [place] [address],  
[phone]

## 2019-04-12 NOTE — PROGRESS NOTES
Patient identified a high risk for readmission RRAT - 14 admitted 3/30/2019 for SBO 
 - Hospitalized 3/22 for same - Ex Lap, lysis of adhesions, Small bowel resection 4/3 
 - History of rectal CA w/ subsequent colostomy Per MD progress note 4/11 hypoactive BS, but tolerating NG clamp Plan - follow for discharge Transitions of  Care Call to assess for ongoing Community Care Management needs.

## 2019-04-12 NOTE — PROGRESS NOTES
Admit Date: 3/30/2019 POD 9 Days Post-Op Procedure:  Procedure(s): EXPLORATORY LAPAROTOMY, LYSIS OF ADHESIONS, SMALL BOWEL RESECTION X1, ENTEROTOMY REPAIR X3, REPAIR OF CYSTOTOMY Subjective:  
 
Patient has complaints of malaise, anxiety. No flatus, but also no nausea or bloating with NGT clamped all day. Objective:  
 
Visit Vitals /57 (BP 1 Location: Right arm, BP Patient Position: At rest) Pulse 77 Temp 97.3 °F (36.3 °C) Resp 18 Ht 5' 8\" (1.727 m) Wt 138 lb (62.6 kg) SpO2 93% Comment: 5L BMI 20.98 kg/m² Temp (24hrs), Av.2 °F (36.8 °C), Min:96.3 °F (35.7 °C), Max:100.4 °F (38 °C) Sarahchastity Mar I&O reviewed as documented. Physical Exam:  
 General-in no distress. Lungs-CTA bilaterally. Respiratory effort is Normal 
 
Heart- reg+PVCs Abdomen- Incision is/(trocar sites are) clean, dry, no drainage and pt has mild appropriately distributed  yony-incisional tenderness. Bowel sounds are present and more active than yesterday. Labs:  
Recent Results (from the past 24 hour(s)) MAGNESIUM Collection Time: 19  6:22 AM  
Result Value Ref Range Magnesium 2.0 1.8 - 2.4 mg/dL PHOSPHORUS Collection Time: 19  6:22 AM  
Result Value Ref Range Phosphorus 3.8 (H) 2.3 - 3.7 MG/DL  
METABOLIC PANEL, BASIC Collection Time: 19  6:22 AM  
Result Value Ref Range Sodium 133 (L) 136 - 145 mmol/L Potassium 4.3 3.5 - 5.1 mmol/L Chloride 96 (L) 98 - 107 mmol/L  
 CO2 32 21 - 32 mmol/L Anion gap 5 mmol/L Glucose 118 (H) 65 - 100 mg/dL BUN 25 (H) 8 - 23 MG/DL Creatinine 0.74 (L) 0.8 - 1.5 MG/DL  
 GFR est AA >60 >60 ml/min/1.73m2 GFR est non-AA >60 ml/min/1.73m2 Calcium 8.2 (L) 8.3 - 10.4 MG/DL  
GLUCOSE, POC Collection Time: 19 11:35 AM  
Result Value Ref Range Glucose (POC) 100 65 - 100 mg/dL Data Review - Assessment:  
 
Principal Problem: 
  Small bowel obstruction ([address] (3/30/2019) Plan/Recommendations/Medical Decision Making:  
 
Keep NGT in place for now, clamped If no distention, n/v overnight then start clears- would not take NGT out until tolerating clears x 24hrs Stop zosyn Continue PPN Add prn ativan Check TSH with labs in a.m.- malaise could be hypothyroid or euthyroid-sick. He may be finally turning the corner.

## 2019-04-13 LAB
ANION GAP SERPL CALC-SCNC: 7 MMOL/L
BUN SERPL-MCNC: 22 MG/DL (ref 8–23)
CALCIUM SERPL-MCNC: 8.6 MG/DL (ref 8.3–10.4)
CHLORIDE SERPL-SCNC: 99 MMOL/L (ref 98–107)
CO2 SERPL-SCNC: 28 MMOL/L (ref 21–32)
CREAT SERPL-MCNC: 0.66 MG/DL (ref 0.8–1.5)
GLUCOSE BLD STRIP.AUTO-MCNC: 119 MG/DL (ref 65–100)
GLUCOSE BLD STRIP.AUTO-MCNC: 123 MG/DL (ref 65–100)
GLUCOSE SERPL-MCNC: 101 MG/DL (ref 65–100)
POTASSIUM SERPL-SCNC: 4.2 MMOL/L (ref 3.5–5.1)
SODIUM SERPL-SCNC: 134 MMOL/L (ref 136–145)
TSH SERPL DL<=0.005 MIU/L-ACNC: 3.16 UIU/ML

## 2019-04-13 PROCEDURE — 77010033678 HC OXYGEN DAILY

## 2019-04-13 PROCEDURE — 74011000250 HC RX REV CODE- 250: Performed by: SURGERY

## 2019-04-13 PROCEDURE — 74011000258 HC RX REV CODE- 258: Performed by: SURGERY

## 2019-04-13 PROCEDURE — 94762 N-INVAS EAR/PLS OXIMTRY CONT: CPT

## 2019-04-13 PROCEDURE — 74011250636 HC RX REV CODE- 250/636: Performed by: SURGERY

## 2019-04-13 PROCEDURE — 74011250637 HC RX REV CODE- 250/637: Performed by: SURGERY

## 2019-04-13 PROCEDURE — 97530 THERAPEUTIC ACTIVITIES: CPT

## 2019-04-13 PROCEDURE — 80048 BASIC METABOLIC PNL TOTAL CA: CPT

## 2019-04-13 PROCEDURE — 82962 GLUCOSE BLOOD TEST: CPT

## 2019-04-13 PROCEDURE — 36415 COLL VENOUS BLD VENIPUNCTURE: CPT

## 2019-04-13 PROCEDURE — 84443 ASSAY THYROID STIM HORMONE: CPT

## 2019-04-13 PROCEDURE — 65270000029 HC RM PRIVATE

## 2019-04-13 RX ADMIN — VITAMIN D, TAB 1000IU (100/BT) 2000 UNITS: 25 TAB at 17:54

## 2019-04-13 RX ADMIN — I.V. FAT EMULSION 250 ML: 20 EMULSION INTRAVENOUS at 19:32

## 2019-04-13 RX ADMIN — LORAZEPAM 1 MG: 2 INJECTION INTRAMUSCULAR; INTRAVENOUS at 02:08

## 2019-04-13 RX ADMIN — VITAMIN D, TAB 1000IU (100/BT) 2000 UNITS: 25 TAB at 08:28

## 2019-04-13 RX ADMIN — OLANZAPINE 5 MG: 2.5 TABLET, FILM COATED ORAL at 01:57

## 2019-04-13 RX ADMIN — OLANZAPINE 2.5 MG: 2.5 TABLET, FILM COATED ORAL at 22:02

## 2019-04-13 RX ADMIN — CALCIUM GLUCONATE: 98 INJECTION, SOLUTION INTRAVENOUS at 19:27

## 2019-04-13 RX ADMIN — CLONAZEPAM 4 MG: 1 TABLET ORAL at 22:01

## 2019-04-13 RX ADMIN — Medication 1 AMPULE: at 22:02

## 2019-04-13 RX ADMIN — AMITRIPTYLINE HYDROCHLORIDE 100 MG: 50 TABLET, FILM COATED ORAL at 22:01

## 2019-04-13 RX ADMIN — Medication 1 AMPULE: at 08:28

## 2019-04-13 RX ADMIN — LEVOTHYROXINE SODIUM 125 MCG: 125 TABLET ORAL at 06:13

## 2019-04-13 NOTE — PROGRESS NOTES
Patient resting in bed. Respirations present, non-labored. Diminished lung sounds bilaterally, 4 LPM high flow oxygen. Abdomen midline w/ staples, ostomy with brown stool. Contino us pulse oximetry in place. Will continue to monitor.

## 2019-04-13 NOTE — PROGRESS NOTES
Problem: Mobility Impaired (Adult and Pediatric) Goal: *Acute Goals and Plan of Care (Insert Text) Description LTG: 
(1.)Mr. Marroquin will move from supine to sit and sit to supine  in bed with SUPERVISION within 7 treatment day(s). (2.)Mr. Marroquin will transfer from bed to chair and chair to bed with SUPERVISION using the least restrictive device within 7 treatment day(s). (3.)Mr. Marroquin will ambulate with SUPERVISION for 650 feet with the least restrictive device within 7 treatment day(s). (4)Mr. Marroquin will perform HEP with cues and assistance to increase safety on her feet in 7 days. (5)Mr. Marroquin will go up a flight of steps CGA with rail in 7 days. ________________________________________________________________________________________________ Outcome: Progressing Towards Goal 
  
PHYSICAL THERAPY: Daily Note and PM 4/13/2019 INPATIENT: PT Visit Days : 5 [place] / [place] / Product Type: Medicare /   
  
NAME/AGE/GENDER: Abebe Louise is a 74 y.o. male PRIMARY DIAGNOSIS: Small bowel obstruction ([address] [K56.609] Small bowel obstruction ([facility] [address] Small bowel obstruction ([facility] [address] Procedure(s) (LRB): 
EXPLORATORY LAPAROTOMY, LYSIS OF ADHESIONS, SMALL BOWEL RESECTION X1, ENTEROTOMY REPAIR X3, REPAIR OF CYSTOTOMY (N/A) 10 Days Post-Op ICD-10: Treatment Diagnosis:  
 · Generalized Muscle Weakness (M62.81) · Other abnormalities of gait and mobility (R26.89) Precaution/Allergies: 
Patient has no known allergies. ASSESSMENT:  
Mr. Nena Mendez presents with general weakness and decreased functional mobility s/p EXPLORATORY LAPAROTOMY, LYSIS OF ADHESIONS, SMALL BOWEL RESECTION X1, ENTEROTOMY REPAIR X3, REPAIR OF CYSTOTOMY on 4/3/19. He lives alone and was independent prior to hospitalization. He plans to go home. Pt was encouraged to gait without IV pole for support & without 02 per RN's OK.  This pt showed an improved level of assist for all functional mobility. Reviewed HEP with written guidelines provided. Encouraged pt to work up to doing 20 reps each, 4 x a day. This pt will need to be functionally independent on DC. This section established at most recent assessment PROBLEM LIST (Impairments causing functional limitations): 1. Decreased Strength 2. Decreased ADL/Functional Activities 3. Decreased Transfer Abilities 4. Decreased Ambulation Ability/Technique 5. Decreased Balance 6. Decreased Activity Tolerance 7. Increased Fatigue 8. Increased Shortness of Breath 9. Decreased Flexibility/Joint Mobility 10. Decreased Swisher with Home Exercise Program 
 INTERVENTIONS PLANNED: (Benefits and precautions of physical therapy have been discussed with the patient.) 1. Balance Exercise 2. Bed Mobility 3. Family Education 4. Gait Training 5. Home Exercise Program (HEP) 6. Range of Motion (ROM) 7. Therapeutic Activites 8. Therapeutic Exercise/Strengthening 9. Transfer Training TREATMENT PLAN: Frequency/Duration: daily for duration of hospital stay Rehabilitation Potential For Stated Goals: Good RECOMMENDED REHABILITATION/EQUIPMENT: (at time of discharge pending progress): Due to the probability of continued deficits (see above) this patient will likely need continued skilled physical therapy after discharge. Equipment:  
? None at this time HISTORY:  
History of Present Injury/Illness (Reason for Referral): EXPLORATORY LAPAROTOMY, LYSIS OF ADHESIONS, SMALL BOWEL RESECTION X1, ENTEROTOMY REPAIR X3, REPAIR OF CYSTOTOMY on 4/9/19.  
 
Past Medical History/Comorbidities:  
Mr. Brenda Peacock  has a past medical history of Family history of malignant neoplasm of gastrointestinal tract, Fecal incontinence, Former cigarette smoker, History of rectal cancer, Hypothyroid, Infection and inflammatory reaction due to other internal prosthetic devices, implants and grafts, subsequent encounter (2017), Insomnia, Osteoarthritis, hand, Personal history of colonic polyps (9/2013), Personal history of malignant neoplasm of rectum, rectosigmoid junction, and anus (9/2013), and Psychiatric disorder. He also has no past medical history of Adverse effect of anesthesia, Aneurysm ([address] Arrhythmia, Asthma, Autoimmune disease ([address] CAD (coronary artery disease), Chronic kidney disease, Chronic obstructive pulmonary disease ([address] Chronic pain, Coagulation disorder ([address] Diabetes ([address] Difficult intubation, Endocarditis, GERD (gastroesophageal reflux disease), Heart failure ([address] Hypertension, Ill-defined condition, Liver disease, Malignant hyperthermia due to anesthesia, Morbid obesity ([address] Nausea & vomiting, Other ill-defined conditions(799.89), Pseudocholinesterase deficiency, PUD (peptic ulcer disease), Rheumatic fever, Seizures ([address] Stroke ([address] Thromboembolus ([address] Unspecified adverse effect of anesthesia, or Unspecified sleep apnea. Mr. Shagufta Vega  has a past surgical history that includes endoscopy, colon, diagnostic (last 2/3/15); hx polypectomy; hx other surgical (10/7/2013); hx colectomy (11/2013); hx colectomy (Right, 8/2014); hx hernia repair (3/2015, 5/2016); hx gi (4/2016); hx colostomy (5/11/16); hx hernia repair; hx other surgical (Bilateral); colonoscopy thru stoma (2/12/2018); colonoscopy (N/A, 2/12/2018); and hx vascular access (Left, 4/14). Social History/Living Environment:  
Home Environment: Other (comment)(townhouse/condo) One/Two Story Residence: Two story Living Alone: Yes Support Systems: Family member(s), Friends \ neighbors Patient Expects to be Discharged to[de-identified] Other (comment)(townhouse/condo) Current DME Used/Available at Home: None Prior Level of Function/Work/Activity: 
independent Number of Personal Factors/Comorbidities that affect the Plan of Care: 1-2: MODERATE COMPLEXITY EXAMINATION:  
Most Recent Physical Functioning:  
Gross Assessment: 
  
         
  
 
  
Balance: 
Sitting: Intact; Without support Standing: Impaired; Without support Bed Mobility: 
Supine to Sit: Stand-by assistance Sit to Supine: Stand-by assistance Scooting: Stand-by assistance Transfers: 
Sit to Stand: Stand-by assistance Stand to Sit: Stand-by assistance Bed to Chair: Stand-by assistance Duration: 44 Minutes(extra time to work through activity noted) Gait: no 02 support during activity, sats stayed WNL Speed/Alicia: Delayed Step Length: Left shortened;Right shortened Gait Abnormalities: Decreased step clearance Distance (ft): 650 Feet (ft) Assistive Device: (none) Ambulation - Level of Assistance: Stand-by assistance Interventions: Safety awareness training;Verbal cues Body Structures Involved: 1. Bones 2. Joints 3. Muscles 4. Ligaments Body Functions Affected: 1. Movement Related Activities and Participation Affected: 1. Mobility Number of elements that affect the Plan of Care: 3: MODERATE COMPLEXITY CLINICAL PRESENTATION:  
Presentation: Stable and uncomplicated: LOW COMPLEXITY CLINICAL DECISION MAKING:  
[employer] AM-PAC 6 Clicks Basic Mobility Inpatient Short Form How much difficulty does the patient currently have. .. Unable A Lot A Little None 1. Turning over in bed (including adjusting bedclothes, sheets and blankets)? ? 1   ? 2   ? 3   ? 4  
2. Sitting down on and standing up from a chair with arms ( e.g., wheelchair, bedside commode, etc.)   ? 1   ? 2   ? 3   ? 4  
3. Moving from lying on back to sitting on the side of the bed?   ? 1   ? 2   ? 3   ? 4 How much help from another person does the patient currently need. .. Total A Lot A Little None 4. Moving to and from a bed to a chair (including a wheelchair)? ? 1   ? 2   ? 3   ? 4  
5. Need to walk in hospital room? ? 1   ? 2   ? 3   ? 4  
6. Climbing 3-5 steps with a railing? ? 1   ? 2   ? 3   ? 4  
© 2007, Trustees of [facility] [employer], under license to [employer]. All rights reserved Score:  Initial: 18 Most Recent: X (Date: -- ) Interpretation of Tool:  Represents activities that are increasingly more difficult (i.e. Bed mobility, Transfers, Gait). Medical Necessity:    
· Patient is expected to demonstrate progress in strength, range of motion and balance ·  to decrease assistance required with theraputic exercises and functional mobility · . Reason for Services/Other Comments: 
· Patient continues to require present interventions due to patient's inability to perform theraputic exercises and functional mobility · . Use of outcome tool(s) and clinical judgement create a POC that gives a: Clear prediction of patient's progress: LOW COMPLEXITY  
  
 
 
 
TREATMENT:  
(In addition to Assessment/Re-Assessment sessions the following treatments were rendered) Pre-treatment Symptoms/Complaints:  Pt was agreeable Pain: Initial: numeric scale Pain Intensity 1: 0  Post Session:  0/10 Therapeutic Activity: (  44 Minutes(extra time to work through activity noted) ):  Therapeutic activities : bed mobility, transfers, prolonged standing static & dynamic for bathroom activities, progressive ambulation & review of HEP with written guidelines provided  to improve mobility, strength and balance. Required minimal Safety awareness training;Verbal cues to promote static and dynamic balance in standing. Braces/Orthotics/Lines/Etc:  
· IV 
· nasogastric tube · O2 Device: Nasal cannula(3L) Treatment/Session Assessment:   
· Response to Treatment:  Questionable if pt will be compliant with HEP, pt seemed unmotivated to do exercises on his own. · Interdisciplinary Collaboration:  
o Registered Nurse · After treatment position/precautions:  
o Up in chair 
o Bed/Chair-wheels locked 
o Call light within reach 
o RN notified 
o Nurse at bedside``````````````````````````` · Compliance with Program/Exercises: Will assess as treatment progresses  no questions. · Recommendations/Intent for next treatment session: \"Next visit will focus on advancements to more challenging activities and reduction in assistance provided\". Total Treatment Duration: PT Patient Time In/Time Out Time In: 3698 Time Out: 1642 Loy Mariscal, NEDRA

## 2019-04-13 NOTE — PROGRESS NOTES
Nutrition follow-up: 
Consult received for TPN management per nutrition support protocols/ Dr. Wisam Singer 4/09. Reason for initial assessment: Length of stay Assessment:  
PN:  Dex 5% 4.25% amino acids at 125 ml/hr with 250 ml 20% lipids daily Diet:  Clear Liquid effective 4/13 at breakfast 
Food/Nutrition Patient History:  Pt presented with 1 week history of abdominal bloating and pain, decreased colostomy output. Past medical history notable for rectal cancer, ileostomy, colostomy in 2014. Central line access : Plan for PICC line placement 4/09. PICC team unable to place PICC. POD 9 surgery/lysis of adhesions, small bowel resection. NGT-LIS: clamped- no nausea per patient; tolerated clear liquids at breakfast this am.  Abdomen: soft, tender,  Active bowel sounds. +flatus, stool in bag. Pertinent Labs: Hyponatremia 134, glucose 111 mg/dl. POC glucose 4/13: 123 mg/dl Anthropometrics:  (3/30) Height: 5' 8\" (172.7 cm),  Weight: 63.5 kg (140 lb), Weight Source: Patient stated, Body mass index is 21.29 kg/m². BMI class of underweight for Older American Male. BMI recommended (23-30) 4/10: Bed weight 62.6 kg 
4/13:  Bed weight 55.8 kg (123#) BMI 18.7 Pt meets ASPEN MALNUTRITIION CRITERIA: chronic illness-moderate malnutrition: weight loss >5% 1 month, intake <75% estimated needs past month, muscle loss. Macronutrient needs: re-estimated 4/13: 
EER:  5217-9942 kcal /day (32-28 kcal/kg BW) EPR:   grams protein/day (1.5-2 grams/kg BW) GFR >60 Max CHO:  321 grams/day (4mg/kg BW/min) Fluid:  1ml/kcal 
Intake/Comparative Standards: Current Clear Liquid status does not meet kcal or protein needs Intervention:  
Meals and snacks: Clear Liquids; advance per MD recommendation as medical condition dictates. Nutritional Supplement Therapy: Electrolyte replacement per nutritional support protocols are active on MAR. PN: 1.  Continue  PPN: 5%DEX/ 4.25%AA; decrease rate to 110 ml/hr with 250 ml 20% Lipids daily- provides 1397 kcal/d (79% of needs), 112 grams of protein/d (100% of needs; provides 2 gm/kg/BW), 132 grams of CHO/d (does not exceed maximum CHO load) and 2890 ml of total volume/d ( >100% of needs). Additives: Sodium 45 mEq/L; K 40 mEq/L, Ca 4.5 mEq/L, Mg 5 mEq/L and Phos 15 mEq/L   
2. Add TPN labs, POC Glucoses/SSI if Glucose > 180 mg/dl on AM BMP   Coordination of Nutrition Care: Gely Armas, PharmD Discharge nutrition plan: Too soon to determine Va Bryson, MPH, [facility], [place] 
[phone]

## 2019-04-13 NOTE — PROGRESS NOTES
Pt alert and anxious; asking about his medication for anxiety which is scheduled for bed time. MD aware. Pt reports gas and some stool in ostomy. NG remains clamped with no c/o nausea or pain.  
Trying clear liquids this am.

## 2019-04-13 NOTE — PROGRESS NOTES
Admit Date: 3/30/2019 POD 10 days post op Procedure:  Procedure(s): EXPLORATORY LAPAROTOMY, LYSIS OF ADHESIONS, SMALL BOWEL RESECTION X1, ENTEROTOMY REPAIR X3, REPAIR OF CYSTOTOMY Subjective:  
 
Patient has complaints of difficulty sleeping. NGT clamped. No nausea/vomiting. + flatus and stool in bag. Objective:  
 
Visit Vitals /73 (BP 1 Location: Right arm, BP Patient Position: At rest;Supine) Pulse 90 Temp 96.7 °F (35.9 °C) Resp 15 Ht 5' 8\" (1.727 m) Wt 123 lb 1.6 oz (55.8 kg) SpO2 97% BMI 18.72 kg/m² Temp (24hrs), Av.6 °F (36.4 °C), Min:96.7 °F (35.9 °C), Max:98.7 °F (37.1 °C) Lluvia  I&O reviewed as documented. Physical Exam:  
 General-in no distress. Generalized weakness. Lungs- NC in place Heart- reg+PVCs Abdomen- Midline incision is clean, dry, no drainage. Pt has mild appropriately distributed yony-incisional tenderness. Ostomy in LLQ with some stool in bag. Labs:  
Recent Results (from the past 24 hour(s)) GLUCOSE, POC Collection Time: 19 11:35 AM  
Result Value Ref Range Glucose (POC) 100 65 - 100 mg/dL GLUCOSE, POC Collection Time: 19  5:52 PM  
Result Value Ref Range Glucose (POC) 101 (H) 65 - 100 mg/dL METABOLIC PANEL, BASIC Collection Time: 19  5:58 AM  
Result Value Ref Range Sodium 134 (L) 136 - 145 mmol/L Potassium 4.2 3.5 - 5.1 mmol/L Chloride 99 98 - 107 mmol/L  
 CO2 28 21 - 32 mmol/L Anion gap 7 mmol/L Glucose 101 (H) 65 - 100 mg/dL BUN 22 8 - 23 MG/DL Creatinine 0.66 (L) 0.8 - 1.5 MG/DL  
 GFR est AA >60 >60 ml/min/1.73m2 GFR est non-AA >60 ml/min/1.73m2 Calcium 8.6 8.3 - 10.4 MG/DL  
TSH 3RD GENERATION Collection Time: 19  5:58 AM  
Result Value Ref Range TSH 3.160 uIU/mL GLUCOSE, POC Collection Time: 19  7:20 AM  
Result Value Ref Range Glucose (POC) 123 (H) 65 - 100 mg/dL Data Review - Assessment:  
 
Principal Problem: Small bowel obstruction ([facility] [address] (3/30/2019) Plan/Recommendations/Medical Decision Making: NGT clamped - keep in place until pt tolerating CLD Start CLD as tolerated Continue PPN 
PRN ativan Has ambien, klonopin for sleep TSH normal 
OOB as tolerated. PT ordered. Kerline Grimaldo MD 
Bariatric & Minimally Invasive Surgery [facility] 4/13/2019 9:04 AM

## 2019-04-14 LAB
ANION GAP SERPL CALC-SCNC: 6 MMOL/L
BUN SERPL-MCNC: 20 MG/DL (ref 8–23)
CALCIUM SERPL-MCNC: 8.8 MG/DL (ref 8.3–10.4)
CHLORIDE SERPL-SCNC: 101 MMOL/L (ref 98–107)
CO2 SERPL-SCNC: 27 MMOL/L (ref 21–32)
CREAT SERPL-MCNC: 0.64 MG/DL (ref 0.8–1.5)
GLUCOSE BLD STRIP.AUTO-MCNC: 109 MG/DL (ref 65–100)
GLUCOSE BLD STRIP.AUTO-MCNC: 117 MG/DL (ref 65–100)
GLUCOSE SERPL-MCNC: 118 MG/DL (ref 65–100)
POTASSIUM SERPL-SCNC: 4.2 MMOL/L (ref 3.5–5.1)
SODIUM SERPL-SCNC: 134 MMOL/L (ref 136–145)

## 2019-04-14 PROCEDURE — 74011250636 HC RX REV CODE- 250/636: Performed by: SURGERY

## 2019-04-14 PROCEDURE — 80048 BASIC METABOLIC PNL TOTAL CA: CPT

## 2019-04-14 PROCEDURE — 97530 THERAPEUTIC ACTIVITIES: CPT

## 2019-04-14 PROCEDURE — C9113 INJ PANTOPRAZOLE SODIUM, VIA: HCPCS | Performed by: SURGERY

## 2019-04-14 PROCEDURE — 74011250637 HC RX REV CODE- 250/637: Performed by: SURGERY

## 2019-04-14 PROCEDURE — 36415 COLL VENOUS BLD VENIPUNCTURE: CPT

## 2019-04-14 PROCEDURE — 82962 GLUCOSE BLOOD TEST: CPT

## 2019-04-14 PROCEDURE — 94762 N-INVAS EAR/PLS OXIMTRY CONT: CPT

## 2019-04-14 PROCEDURE — 97110 THERAPEUTIC EXERCISES: CPT

## 2019-04-14 PROCEDURE — 74011000258 HC RX REV CODE- 258: Performed by: SURGERY

## 2019-04-14 PROCEDURE — 65270000029 HC RM PRIVATE

## 2019-04-14 PROCEDURE — 97116 GAIT TRAINING THERAPY: CPT

## 2019-04-14 PROCEDURE — 77010033678 HC OXYGEN DAILY

## 2019-04-14 PROCEDURE — 74011000250 HC RX REV CODE- 250: Performed by: SURGERY

## 2019-04-14 RX ORDER — DOCUSATE SODIUM 100 MG/1
100 CAPSULE, LIQUID FILLED ORAL 2 TIMES DAILY
Status: DISCONTINUED | OUTPATIENT
Start: 2019-04-14 | End: 2019-04-23

## 2019-04-14 RX ORDER — PANTOPRAZOLE SODIUM 40 MG/10ML
40 INJECTION, POWDER, LYOPHILIZED, FOR SOLUTION INTRAVENOUS EVERY 24 HOURS
Status: DISCONTINUED | OUTPATIENT
Start: 2019-04-14 | End: 2019-05-03 | Stop reason: HOSPADM

## 2019-04-14 RX ADMIN — OLANZAPINE 2.5 MG: 2.5 TABLET, FILM COATED ORAL at 22:27

## 2019-04-14 RX ADMIN — I.V. FAT EMULSION 250 ML: 20 EMULSION INTRAVENOUS at 18:17

## 2019-04-14 RX ADMIN — PANTOPRAZOLE SODIUM 40 MG: 40 INJECTION, POWDER, FOR SOLUTION INTRAVENOUS at 09:00

## 2019-04-14 RX ADMIN — CLONAZEPAM 4 MG: 1 TABLET ORAL at 22:26

## 2019-04-14 RX ADMIN — DIPHENHYDRAMINE HYDROCHLORIDE 12.5 MG: 50 INJECTION, SOLUTION INTRAMUSCULAR; INTRAVENOUS at 02:36

## 2019-04-14 RX ADMIN — VITAMIN D, TAB 1000IU (100/BT) 2000 UNITS: 25 TAB at 08:58

## 2019-04-14 RX ADMIN — OLANZAPINE 5 MG: 2.5 TABLET, FILM COATED ORAL at 02:28

## 2019-04-14 RX ADMIN — Medication 1 AMPULE: at 08:58

## 2019-04-14 RX ADMIN — AMITRIPTYLINE HYDROCHLORIDE 100 MG: 50 TABLET, FILM COATED ORAL at 22:26

## 2019-04-14 RX ADMIN — LEVOTHYROXINE SODIUM 125 MCG: 125 TABLET ORAL at 06:43

## 2019-04-14 RX ADMIN — CALCIUM GLUCONATE: 98 INJECTION, SOLUTION INTRAVENOUS at 18:12

## 2019-04-14 RX ADMIN — VITAMIN D, TAB 1000IU (100/BT) 2000 UNITS: 25 TAB at 18:21

## 2019-04-14 RX ADMIN — Medication 1 AMPULE: at 22:25

## 2019-04-14 NOTE — PROGRESS NOTES
Care assumed, assess complete. Pt frustrated at slow progress, but naye clear liquids at this time. Loose stools in bag. NGT removed without difficulty. Pt naye procedure well.

## 2019-04-14 NOTE — PROGRESS NOTES
Nutrition follow-up: 
Consult received for TPN management per nutrition support protocols/ Dr. Wisam Singer 4/09. Reason for initial assessment: Length of stay Assessment:  
PN:  Dex 5% 4.25% amino acids at 110 ml/hr with 250 ml 20% lipids daily Diet:  Clear Liquid effective 4/13 at breakfast 
Food/Nutrition Patient History:  Pt presented with 1 week history of abdominal bloating and pain, decreased colostomy output. Past medical history notable for rectal cancer, ileostomy, colostomy in 2014. Central line access : Plan for PICC line placement 4/09. PICC team unable to place PICC. POD 10 surgery/lysis of adhesions, small bowel resection. NGT-LIS: removed 4/14; pt tolerating broth; states the juices and other sweet liquids makes him nauseated. Abdomen: soft, tender, active bowel sounds. +flatus, stool in bag. Pertinent Labs: Hyponatremia 134, glucose 118 mg/dl. POC glucose 4/14: 117 mg/dl Anthropometrics:  (3/30) Height: 5' 8\" (172.7 cm),  Weight: 63.5 kg (140 lb), Weight Source: Patient stated, Body mass index is 21.29 kg/m². BMI class of underweight for Older American Male. BMI recommended (23-30) 4/10: Bed weight 62.6 kg 
4/13:  Bed weight 55.8 kg (123#) BMI 18.7 Pt meets ASPEN MALNUTRITIION CRITERIA: chronic illness-moderate malnutrition: weight loss >5% 1 month, intake <75% estimated needs past month, muscle loss. Macronutrient needs: re-estimated 4/13: 
EER:  5255-1796 kcal /day (32-28 kcal/kg BW) EPR:   grams protein/day (1.5-2 grams/kg BW) GFR >60 Max CHO:  321 grams/day (4mg/kg BW/min) Fluid:  1ml/kcal 
Intake/Comparative Standards: Current Clear Liquid status does not meet kcal or protein needs Intervention:  
Meals and snacks: Clear Liquids; advance per MD recommendation as medical condition dictates.   Pt requesting only broth and water while on clear liquids; no juice, jello, popsicle, tea \"too sweet\" and doesn't want sugar free products. Declines Ensure Clear-\"too sweet and sweets are making me nauseated\". Nutritional Supplement Therapy: Electrolyte replacement per nutritional support protocols are active on MAR. PN: 1. Continue  PPN: 5%DEX/ 4.25%AA; at 110 ml/hr with 250 ml 20% Lipids daily- provides 1397 kcal/d (79% of needs), 112 grams of protein/d (100% of needs; provides 2 gm/kg/BW), 132 grams of CHO/d (does not exceed maximum CHO load) and 2890 ml of total volume/d ( >100% of needs). Additives: Sodium 45 mEq/L; K 40 mEq/L, Ca 4.5 mEq/L, Mg 5 mEq/L and Phos 15 mEq/L   
2. Add TPN labs, POC Glucoses/SSI if Glucose > 180 mg/dl on AM Coordination of Nutrition Care:  Dr. Earline Wu,  RN,  Stanley Etienne, PharmD Discharge nutrition plan: Too soon to determine Francois Chavez, MPH, [address] [place] [address],  
[phone]

## 2019-04-14 NOTE — PROGRESS NOTES
Resting quietly, awake, resp even, unlab, skin warm, dry. NG intact to left nare, clamped. Ostomy emptied of loose, brown stool, stoma pink, moist. Mid abdom incision intact with staples, dsg clean, dry, intact. Voids without difficulty. Agrees to call for asst to be out of bed. Assessment noted. No needs, no c/o, no distress.

## 2019-04-14 NOTE — PROGRESS NOTES
Admit Date: 3/30/2019 POD 10 days post op Procedure:  Procedure(s): EXPLORATORY LAPAROTOMY, LYSIS OF ADHESIONS, SMALL BOWEL RESECTION X1, ENTEROTOMY REPAIR X3, REPAIR OF CYSTOTOMY Subjective:  
 
Patient reports tolerating his CLD yesterday. No nausea or vomiting. NGT still clamped. + flatus and stool in bag. Objective:  
 
Visit Vitals /83 (BP 1 Location: Right arm, BP Patient Position: At rest) Pulse 95 Temp 98.9 °F (37.2 °C) Resp 18 Ht 5' 8\" (1.727 m) Wt 123 lb 1.6 oz (55.8 kg) SpO2 97% BMI 18.72 kg/m² Temp (24hrs), Av.6 °F (36.4 °C), Min:95.5 °F (35.3 °C), Max:98.9 °F (37.2 °C) Lluvia  I&O reviewed as documented. Physical Exam:  
 General-in no distress. Generalized weakness. Lungs- NC in place Heart- reg+PVCs Abdomen- Midline incision is clean, dry, no drainage. Pt has mild appropriately distributed yony-incisional tenderness. Ostomy in LLQ with some stool in bag. Labs:  
Recent Results (from the past 24 hour(s)) GLUCOSE, POC Collection Time: 19 11:14 AM  
Result Value Ref Range Glucose (POC) 119 (H) 65 - 100 mg/dL GLUCOSE, POC Collection Time: 19 12:17 AM  
Result Value Ref Range Glucose (POC) 117 (H) 65 - 100 mg/dL METABOLIC PANEL, BASIC Collection Time: 19  5:57 AM  
Result Value Ref Range Sodium 134 (L) 136 - 145 mmol/L Potassium 4.2 3.5 - 5.1 mmol/L Chloride 101 98 - 107 mmol/L  
 CO2 27 21 - 32 mmol/L Anion gap 6 mmol/L Glucose 118 (H) 65 - 100 mg/dL BUN 20 8 - 23 MG/DL Creatinine 0.64 (L) 0.8 - 1.5 MG/DL  
 GFR est AA >60 >60 ml/min/1.73m2 GFR est non-AA >60 ml/min/1.73m2 Calcium 8.8 8.3 - 10.4 MG/DL Assessment:  
 
Principal Problem: 
  Small bowel obstruction ([address] (3/30/2019) Plan/Recommendations/Medical Decision Making:  
DC NGT Cont CLD - go slow Lytes stable - mild hyponatremia 134 Continue PPN 
PRN ativan Has ambien, klonopin for sleep TSH normal 
 OOB as tolerated PT ordered. Filiberto Moore MD 
Bariatric & Minimally Invasive Surgery [address] [facility] 4/14/2019 8:14 AM

## 2019-04-14 NOTE — PROGRESS NOTES
Resting quietly, resp even, unlab. Eyes closed with relaxed facial expression during report given to Dilan Gutierrez RN. No distress noted.

## 2019-04-14 NOTE — PROGRESS NOTES
Problem: Mobility Impaired (Adult and Pediatric) Goal: *Acute Goals and Plan of Care (Insert Text) DISCHARGE GOALS / RE-ASSESSMENT 4/14/19 
(1.)Mr. Marroquin will move from supine to sit and sit to supine  in bed with MODIFIED INDEPENDENT. (2.)Mr. Marroquin will transfer from bed to chair and chair to bed with SUPERVISION (consistently) using the least restrictive device. (3.)Mr. Marroquin will ambulate with SUPERVISION (consistently) for 650 feet with the least restrictive device. (4)Mr. Marroquin will perform HEP with written guidelines 3 x a day. (5)Mr. Marroquin will go up a flight of steps CGA with rail & SUPERVISION. ________________________________________________________________________________________________ Outcome: Progressing Towards Goal 
  
PHYSICAL THERAPY: Re-evaluation and PM 4/14/2019 INPATIENT: PT Visit Days : 1 [place] / Product Type: Medicare /   
  
NAME/AGE/GENDER: Kallie Stock is a 74 y.o. male PRIMARY DIAGNOSIS: Small bowel obstruction ([facility] [K56.609] Small bowel obstruction ([facility] Small bowel obstruction ([facility] Procedure(s) (LRB): 
EXPLORATORY LAPAROTOMY, LYSIS OF ADHESIONS, SMALL BOWEL RESECTION X1, ENTEROTOMY REPAIR X3, REPAIR OF CYSTOTOMY (N/A) 11 Days Post-Op ICD-10: Treatment Diagnosis:  
 · Generalized Muscle Weakness (M62.81) · Other abnormalities of gait and mobility (R26.89) Precaution/Allergies: 
Patient has no known allergies. ASSESSMENT:  
Mr. Anand Andujar presents with general weakness and decreased functional mobility s/p EXPLORATORY LAPAROTOMY, LYSIS OF ADHESIONS, SMALL BOWEL RESECTION X1, ENTEROTOMY REPAIR X3, REPAIR OF CYSTOTOMY on 4/3/19. He lives alone and was independent prior to hospitalization. He plans to go home. Pt showed better quality of steps during gait this pm & progressed pt to changing directions along with side, back & box stepping exercises. Need to encourage pt to do HEP. This section established at most recent assessment PROBLEM LIST (Impairments causing functional limitations): 1. Decreased Strength 2. Decreased ADL/Functional Activities 3. Decreased Transfer Abilities 4. Decreased Ambulation Ability/Technique 5. Decreased Balance 6. Decreased Activity Tolerance 7. Increased Fatigue 8. Increased Shortness of Breath 9. Decreased Flexibility/Joint Mobility 10. Decreased [place] with Home Exercise Program 
 INTERVENTIONS PLANNED: (Benefits and precautions of physical therapy have been discussed with the patient.) 1. Balance Exercise 2. Bed Mobility 3. Family Education 4. Gait Training 5. Home Exercise Program (HEP) 6. Range of Motion (ROM) 7. Therapeutic Activites 8. Therapeutic Exercise/Strengthening 9. Transfer Training TREATMENT PLAN: Frequency/Duration: daily for duration of hospital stay Rehabilitation Potential For Stated Goals: Good RECOMMENDED REHABILITATION/EQUIPMENT: (at time of discharge pending progress): Due to the probability of continued deficits (see above) this patient will likely need continued skilled physical therapy after discharge. Equipment:  
? None at this time HISTORY:  
History of Present Injury/Illness (Reason for Referral): EXPLORATORY LAPAROTOMY, LYSIS OF ADHESIONS, SMALL BOWEL RESECTION X1, ENTEROTOMY REPAIR X3, REPAIR OF CYSTOTOMY on 4/9/19.  
 
Past Medical History/Comorbidities:  
Mr. Anand Andujar  has a past medical history of Family history of malignant neoplasm of gastrointestinal tract, Fecal incontinence, Former cigarette smoker, History of rectal cancer, Hypothyroid, Infection and inflammatory reaction due to other internal prosthetic devices, implants and grafts, subsequent encounter (2017), Insomnia, Osteoarthritis, hand, Personal history of colonic polyps (9/2013), Personal history of malignant neoplasm of rectum, rectosigmoid junction, and anus (9/2013), and Psychiatric disorder. He also has no past medical history of Adverse effect of anesthesia, Aneurysm ([address] Arrhythmia, Asthma, Autoimmune disease ([address] CAD (coronary artery disease), Chronic kidney disease, Chronic obstructive pulmonary disease ([address] Chronic pain, Coagulation disorder ([address] Diabetes ([address] Difficult intubation, Endocarditis, GERD (gastroesophageal reflux disease), Heart failure ([address] Hypertension, Ill-defined condition, Liver disease, Malignant hyperthermia due to anesthesia, Morbid obesity ([address] Nausea & vomiting, Other ill-defined conditions(799.89), Pseudocholinesterase deficiency, PUD (peptic ulcer disease), Rheumatic fever, Seizures ([address] Stroke ([address] Thromboembolus ([address] Unspecified adverse effect of anesthesia, or Unspecified sleep apnea. Mr. Daisy Fonseca  has a past surgical history that includes endoscopy, colon, diagnostic (last 2/3/15); hx polypectomy; hx other surgical (10/7/2013); hx colectomy (11/2013); hx colectomy (Right, 8/2014); hx hernia repair (3/2015, 5/2016); hx gi (4/2016); hx colostomy (5/11/16); hx hernia repair; hx other surgical (Bilateral); colonoscopy thru stoma (2/12/2018); colonoscopy (N/A, 2/12/2018); and hx vascular access (Left, 4/14). Social History/Living Environment:  
Home Environment: Other (comment)(townhouse/condo) One/Two Story Residence: Two story Living Alone: Yes Support Systems: Family member(s), Friends \ neighbors Patient Expects to be Discharged to[de-identified] Other (comment)([employer]/condo) Current DME Used/Available at Home: None Prior Level of Function/Work/Activity: 
independent Number of Personal Factors/Comorbidities that affect the Plan of Care: 1-2: MODERATE COMPLEXITY EXAMINATION:  
Most Recent Physical Functioning:  
Gross Assessment: 
  
         
  
 
  
Balance: 
Sitting: Intact; Without support Standing: Impaired; Without support Bed Mobility: 
Supine to Sit: Stand-by assistance Sit to Supine: Stand-by assistance Scooting: Stand-by assistance Transfers: 
Sit to Stand: Stand-by assistance Stand to Sit: Stand-by assistance Bed to Chair: Stand-by assistance Gait:  
  
Speed/Alicia: Delayed Step Length: Left shortened;Right shortened Gait Abnormalities: Decreased step clearance Distance (ft): 650 Feet (ft) Assistive Device: (none) Ambulation - Level of Assistance: Stand-by assistance Interventions: Safety awareness training;Verbal cues Duration: 12 Minutes Body Structures Involved: 1. Bones 2. Joints 3. Muscles 4. Ligaments Body Functions Affected: 1. Movement Related Activities and Participation Affected: 1. Mobility Number of elements that affect the Plan of Care: 3: MODERATE COMPLEXITY CLINICAL PRESENTATION:  
Presentation: Stable and uncomplicated: LOW COMPLEXITY CLINICAL DECISION MAKIN[address] [place] [address] AM-PAC 6 Clicks Basic Mobility Inpatient Short Form How much difficulty does the patient currently have. .. Unable A Lot A Little None 1. Turning over in bed (including adjusting bedclothes, sheets and blankets)? ? 1   ? 2   ? 3   ? 4  
2. Sitting down on and standing up from a chair with arms ( e.g., wheelchair, bedside commode, etc.)   ? 1   ? 2   ? 3   ? 4  
3. Moving from lying on back to sitting on the side of the bed?   ? 1   ? 2   ? 3   ? 4 How much help from another person does the patient currently need. .. Total A Lot A Little None 4. Moving to and from a bed to a chair (including a wheelchair)? ? 1   ? 2   ? 3   ? 4  
5. Need to walk in hospital room? ? 1   ? 2   ? 3   ? 4  
6. Climbing 3-5 steps with a railing? ? 1   ? 2   ? 3   ? 4  
© , Trustees of [address] [place] [address], under license to [employer]. All rights reserved Score:  Initial: 18 Most Recent: X (Date: -- ) Interpretation of Tool:  Represents activities that are increasingly more difficult (i.e. Bed mobility, Transfers, Gait). Medical Necessity: · Patient is expected to demonstrate progress in strength, range of motion and balance ·  to decrease assistance required with theraputic exercises and functional mobility · . Reason for Services/Other Comments: 
· Patient continues to require present interventions due to patient's inability to perform theraputic exercises and functional mobility · . Use of outcome tool(s) and clinical judgement create a POC that gives a: Clear prediction of patient's progress: LOW COMPLEXITY  
  
 
 
 
TREATMENT:  
(In addition to Assessment/Re-Assessment sessions the following treatments were rendered) Pre-treatment Symptoms/Complaints:  \" I just feel bad \" 
Pain: Initial: numeric scale Pain Intensity 1: 3 Pain Location 1: Abdomen Pain Orientation 1: Mid  Post Session:  3/10 Gait Training (12 Minutes):  Gait training to improve and/or restore physical functioning as related to mobility, strength, balance, coordination and dynamic movement of leg - bilateral to improve functional gait. Ambulated 650 Feet (ft) with Stand-by assistance using a (none) and minimal Safety awareness training;Verbal cues related to their stride length and heel strike to promote proper body alignment, promote proper body posture and promote proper body mechanics. Therapeutic Exercise: (12 Minutes):  Exercises per grid below to improve mobility, strength, balance, coordination and dynamic movement of leg - bilateral and core to improve functional core stability & endurance. Required minimal verbal cues to promote proper body alignment, promote proper body posture and promote proper body mechanics. Progressed complexity of movement as indicated. Braces/Orthotics/Lines/Etc:  
· IV Treatment/Session Assessment:   
· Response to Treatment:   Pt appears very depressed, may ask justine to visit with pt 
· Interdisciplinary Collaboration:  
o Registered Nurse · After treatment position/precautions:  
o Supine in bed o Bed/Chair-wheels locked 
o Bed in low position 
o Call light within reach 
o RN notified``````````````````````````` · Compliance with Program/Exercises: Will assess as treatment progresses  no questions. · Recommendations/Intent for next treatment session: \"Next visit will focus on advancements to more challenging activities and reduction in assistance provided\". Total Treatment Duration: PT Patient Time In/Time Out Time In: 2759 Time Out: 0781 Agustina Closs, PT

## 2019-04-14 NOTE — PROGRESS NOTES
After awaking, requested med to return to sleep; Benadryl 12.5 mg given IVP slowly. Reminded to call for asst to be out of bed, agreed.

## 2019-04-14 NOTE — PROGRESS NOTES
Resting quietly, awake, resp even, unlab, O2 sat 97%. Skin warm, dry. Lungs clear. Abdom soft, incision intact with staples, dsg clean, dry, intact, bowel sounds active. Ostomy bag emptied of 50 ml liquid, brown stool, stoma pink, moist. Voids without difficulty. Agrees to call for asst to be out of bed. No needs, no c/o, no distress.

## 2019-04-15 LAB
ANION GAP SERPL CALC-SCNC: 7 MMOL/L
BUN SERPL-MCNC: 30 MG/DL (ref 8–23)
CALCIUM SERPL-MCNC: 9 MG/DL (ref 8.3–10.4)
CHLORIDE SERPL-SCNC: 105 MMOL/L (ref 98–107)
CO2 SERPL-SCNC: 23 MMOL/L (ref 21–32)
CREAT SERPL-MCNC: 1 MG/DL (ref 0.8–1.5)
GLUCOSE BLD STRIP.AUTO-MCNC: 105 MG/DL (ref 65–100)
GLUCOSE BLD STRIP.AUTO-MCNC: 106 MG/DL (ref 65–100)
GLUCOSE BLD STRIP.AUTO-MCNC: 112 MG/DL (ref 65–100)
GLUCOSE BLD STRIP.AUTO-MCNC: 119 MG/DL (ref 65–100)
GLUCOSE SERPL-MCNC: 109 MG/DL (ref 65–100)
MAGNESIUM SERPL-MCNC: 2.3 MG/DL (ref 1.8–2.4)
PHOSPHATE SERPL-MCNC: 5.1 MG/DL (ref 2.3–3.7)
POTASSIUM SERPL-SCNC: 4.4 MMOL/L (ref 3.5–5.1)
SODIUM SERPL-SCNC: 135 MMOL/L (ref 136–145)

## 2019-04-15 PROCEDURE — 65270000029 HC RM PRIVATE

## 2019-04-15 PROCEDURE — 74011000258 HC RX REV CODE- 258: Performed by: SURGERY

## 2019-04-15 PROCEDURE — 82962 GLUCOSE BLOOD TEST: CPT

## 2019-04-15 PROCEDURE — 74011250637 HC RX REV CODE- 250/637: Performed by: SURGERY

## 2019-04-15 PROCEDURE — 36415 COLL VENOUS BLD VENIPUNCTURE: CPT

## 2019-04-15 PROCEDURE — 84100 ASSAY OF PHOSPHORUS: CPT

## 2019-04-15 PROCEDURE — C9113 INJ PANTOPRAZOLE SODIUM, VIA: HCPCS | Performed by: SURGERY

## 2019-04-15 PROCEDURE — 74011000250 HC RX REV CODE- 250: Performed by: SURGERY

## 2019-04-15 PROCEDURE — 97116 GAIT TRAINING THERAPY: CPT

## 2019-04-15 PROCEDURE — 74011250636 HC RX REV CODE- 250/636: Performed by: SURGERY

## 2019-04-15 PROCEDURE — 83735 ASSAY OF MAGNESIUM: CPT

## 2019-04-15 PROCEDURE — 80048 BASIC METABOLIC PNL TOTAL CA: CPT

## 2019-04-15 PROCEDURE — 94762 N-INVAS EAR/PLS OXIMTRY CONT: CPT

## 2019-04-15 PROCEDURE — 77010033678 HC OXYGEN DAILY

## 2019-04-15 RX ADMIN — PANTOPRAZOLE SODIUM 40 MG: 40 INJECTION, POWDER, FOR SOLUTION INTRAVENOUS at 08:38

## 2019-04-15 RX ADMIN — ACETAMINOPHEN 650 MG: 325 TABLET, FILM COATED ORAL at 09:18

## 2019-04-15 RX ADMIN — OLANZAPINE 5 MG: 2.5 TABLET, FILM COATED ORAL at 03:13

## 2019-04-15 RX ADMIN — CLONAZEPAM 4 MG: 1 TABLET ORAL at 23:01

## 2019-04-15 RX ADMIN — CALCIUM GLUCONATE: 98 INJECTION, SOLUTION INTRAVENOUS at 17:19

## 2019-04-15 RX ADMIN — ONDANSETRON 4 MG: 2 INJECTION INTRAMUSCULAR; INTRAVENOUS at 15:04

## 2019-04-15 RX ADMIN — OLANZAPINE 2.5 MG: 2.5 TABLET, FILM COATED ORAL at 23:01

## 2019-04-15 RX ADMIN — VITAMIN D, TAB 1000IU (100/BT) 2000 UNITS: 25 TAB at 17:20

## 2019-04-15 RX ADMIN — VITAMIN D, TAB 1000IU (100/BT) 2000 UNITS: 25 TAB at 08:38

## 2019-04-15 RX ADMIN — LEVOTHYROXINE SODIUM 125 MCG: 125 TABLET ORAL at 05:33

## 2019-04-15 RX ADMIN — AMITRIPTYLINE HYDROCHLORIDE 100 MG: 50 TABLET, FILM COATED ORAL at 23:01

## 2019-04-15 RX ADMIN — DIPHENHYDRAMINE HYDROCHLORIDE 12.5 MG: 50 INJECTION, SOLUTION INTRAMUSCULAR; INTRAVENOUS at 03:14

## 2019-04-15 RX ADMIN — I.V. FAT EMULSION 250 ML: 20 EMULSION INTRAVENOUS at 17:19

## 2019-04-15 RX ADMIN — Medication 1 AMPULE: at 08:38

## 2019-04-15 RX ADMIN — Medication 1 AMPULE: at 21:00

## 2019-04-15 NOTE — PROGRESS NOTES
IV site swollen, TPN and Lipids stopped and after two unsuccessful attempts to restart IV, Starla Villalpando RN notified for asst. Dacia Yee RN aware.

## 2019-04-15 NOTE — PROGRESS NOTES
Assessment done via doc flow sheet. Pt sitting on edge of bed, alert & oriented x4, resp easy & regular, lung sounds diminished. Midline incision to abdomen with staples intact and open to air. Ostomy within with scant amount of loose brown stool in bag. Denies pain. Bed low & locked, side rails x3 up with call light within reach, pt instructed to call for assistance as needed.

## 2019-04-15 NOTE — PROGRESS NOTES
Problem: Mobility Impaired (Adult and Pediatric) Goal: *Acute Goals and Plan of Care (Insert Text) DISCHARGE GOALS / RE-ASSESSMENT 4/14/19 
(1.)Mr. Marroquin will move from supine to sit and sit to supine  in bed with MODIFIED INDEPENDENT. (2.)Mr. Marroquin will transfer from bed to chair and chair to bed with SUPERVISION (consistently) using the least restrictive device. (3.)Mr. Marroquin will ambulate with SUPERVISION (consistently) for 650 feet with the least restrictive device. (4)Mr. Marroquin will perform HEP with written guidelines 3 x a day. (5)Mr. Marroquin will go up a flight of steps CGA with rail & SUPERVISION. ________________________________________________________________________________________________ Outcome: Progressing Towards Goal 
  
PHYSICAL THERAPY: Daily Note and AM 4/15/2019 INPATIENT: PT Visit Days : 2 [place] / Product Type: Medicare /   
  
NAME/AGE/GENDER: Zoila Pendleton is a 74 y.o. male PRIMARY DIAGNOSIS: Small bowel obstruction ([facility] [K56.609] Small bowel obstruction ([facility] Small bowel obstruction ([facility] Procedure(s) (LRB): 
EXPLORATORY LAPAROTOMY, LYSIS OF ADHESIONS, SMALL BOWEL RESECTION X1, ENTEROTOMY REPAIR X3, REPAIR OF CYSTOTOMY (N/A) 12 Days Post-Op ICD-10: Treatment Diagnosis:  
 · Generalized Muscle Weakness (M62.81) · Other abnormalities of gait and mobility (R26.89) Precaution/Allergies: 
Patient has no known allergies. ASSESSMENT:  
Mr. Nabeel Cruz presents with general weakness and decreased functional mobility s/p EXPLORATORY LAPAROTOMY, LYSIS OF ADHESIONS, SMALL BOWEL RESECTION X1, ENTEROTOMY REPAIR X3, REPAIR OF CYSTOTOMY on 4/3/19. He lives alone and was independent prior to hospitalization. He plans to go home. 4/15 am he is supine upon arrival.  Work on bed mobility as follows: supine>EOB>stand with SBA. He walks 1300 ft using RW with SBA and 1 rest breaks. He return to the bed with call light near. This section established at most recent assessment PROBLEM LIST (Impairments causing functional limitations): 1. Decreased Strength 2. Decreased ADL/Functional Activities 3. Decreased Transfer Abilities 4. Decreased Ambulation Ability/Technique 5. Decreased Balance 6. Decreased Activity Tolerance 7. Increased Fatigue 8. Increased Shortness of Breath 9. Decreased Flexibility/Joint Mobility 10. Decreased [place] with Home Exercise Program 
 INTERVENTIONS PLANNED: (Benefits and precautions of physical therapy have been discussed with the patient.) 1. Balance Exercise 2. Bed Mobility 3. Family Education 4. Gait Training 5. Home Exercise Program (HEP) 6. Range of Motion (ROM) 7. Therapeutic Activites 8. Therapeutic Exercise/Strengthening 9. Transfer Training TREATMENT PLAN: Frequency/Duration: daily for duration of hospital stay Rehabilitation Potential For Stated Goals: Good RECOMMENDED REHABILITATION/EQUIPMENT: (at time of discharge pending progress): Due to the probability of continued deficits (see above) this patient will likely need continued skilled physical therapy after discharge. Equipment:  
? None at this time HISTORY:  
History of Present Injury/Illness (Reason for Referral): EXPLORATORY LAPAROTOMY, LYSIS OF ADHESIONS, SMALL BOWEL RESECTION X1, ENTEROTOMY REPAIR X3, REPAIR OF CYSTOTOMY on 4/9/19.  
 
Past Medical History/Comorbidities:  
Mr. Aleksander Mendez  has a past medical history of Family history of malignant neoplasm of gastrointestinal tract, Fecal incontinence, Former cigarette smoker, History of rectal cancer, Hypothyroid, Infection and inflammatory reaction due to other internal prosthetic devices, implants and grafts, subsequent encounter (2017), Insomnia, Osteoarthritis, hand, Personal history of colonic polyps (9/2013), Personal history of malignant neoplasm of rectum, rectosigmoid junction, and anus (9/2013), and Psychiatric disorder. He also has no past medical history of Adverse effect of anesthesia, Aneurysm ([address] Arrhythmia, Asthma, Autoimmune disease ([address] CAD (coronary artery disease), Chronic kidney disease, Chronic obstructive pulmonary disease ([address] Chronic pain, Coagulation disorder ([address] Diabetes ([address] Difficult intubation, Endocarditis, GERD (gastroesophageal reflux disease), Heart failure ([address] Hypertension, Ill-defined condition, Liver disease, Malignant hyperthermia due to anesthesia, Morbid obesity ([address] Nausea & vomiting, Other ill-defined conditions(799.89), Pseudocholinesterase deficiency, PUD (peptic ulcer disease), Rheumatic fever, Seizures ([address] Stroke ([address] Thromboembolus ([address] Unspecified adverse effect of anesthesia, or Unspecified sleep apnea. Mr. Garcia Current  has a past surgical history that includes endoscopy, colon, diagnostic (last 2/3/15); hx polypectomy; hx other surgical (10/7/2013); hx colectomy (11/2013); hx colectomy (Right, 8/2014); hx hernia repair (3/2015, 5/2016); hx gi (4/2016); hx colostomy (5/11/16); hx hernia repair; hx other surgical (Bilateral); colonoscopy thru stoma (2/12/2018); colonoscopy (N/A, 2/12/2018); and hx vascular access (Left, 4/14). Social History/Living Environment:  
Home Environment: Other (comment)(townhouse/condo) One/Two Story Residence: Two story Living Alone: Yes Support Systems: Family member(s), Friends \ neighbors Patient Expects to be Discharged to[de-identified] Other (comment)([place]/condo) Current DME Used/Available at Home: None Prior Level of Function/Work/Activity: 
independent Number of Personal Factors/Comorbidities that affect the Plan of Care: 1-2: MODERATE COMPLEXITY EXAMINATION:  
Most Recent Physical Functioning:  
Gross Assessment: 
  
         
  
 
  
Balance: 
  Bed Mobility: 
Supine to Sit: Stand-by assistance Sit to Supine: Stand-by assistance Scooting: Stand-by assistance Transfers: 
Sit to Stand: Stand-by assistance Stand to Sit: Contact guard assistance Bed to Chair: Stand-by assistance Duration: 23 Minutes Gait:  
  
Speed/Alicia: Delayed Step Length: Left shortened;Right shortened Gait Abnormalities: Decreased step clearance Distance (ft): 1300 Feet (ft)(with 1 break) Assistive Device: (no AD) Ambulation - Level of Assistance: Stand-by assistance Interventions: Safety awareness training Duration: 23 Minutes Body Structures Involved: 1. Bones 2. Joints 3. Muscles 4. Ligaments Body Functions Affected: 1. Movement Related Activities and Participation Affected: 1. Mobility Number of elements that affect the Plan of Care: 3: MODERATE COMPLEXITY CLINICAL PRESENTATION:  
Presentation: Stable and uncomplicated: LOW COMPLEXITY CLINICAL DECISION MAKING:  
[facility] AM-PAC 6 Clicks Basic Mobility Inpatient Short Form How much difficulty does the patient currently have. .. Unable A Lot A Little None 1. Turning over in bed (including adjusting bedclothes, sheets and blankets)? ? 1   ? 2   ? 3   ? 4  
2. Sitting down on and standing up from a chair with arms ( e.g., wheelchair, bedside commode, etc.)   ? 1   ? 2   ? 3   ? 4  
3. Moving from lying on back to sitting on the side of the bed?   ? 1   ? 2   ? 3   ? 4 How much help from another person does the patient currently need. .. Total A Lot A Little None 4. Moving to and from a bed to a chair (including a wheelchair)? ? 1   ? 2   ? 3   ? 4  
5. Need to walk in hospital room? ? 1   ? 2   ? 3   ? 4  
6. Climbing 3-5 steps with a railing? ? 1   ? 2   ? 3   ? 4  
© 2007, Trustees of [facility], under license to [employer]. All rights reserved Score:  Initial: 18 Most Recent: X (Date: -- ) Interpretation of Tool:  Represents activities that are increasingly more difficult (i.e. Bed mobility, Transfers, Gait).  
 
Medical Necessity:    
· Patient is expected to demonstrate progress in strength, range of motion and balance ·  to decrease assistance required with theraputic exercises and functional mobility · . Reason for Services/Other Comments: 
· Patient continues to require present interventions due to patient's inability to perform theraputic exercises and functional mobility · . Use of outcome tool(s) and clinical judgement create a POC that gives a: Clear prediction of patient's progress: LOW COMPLEXITY  
  
 
 
 
TREATMENT:  
(In addition to Assessment/Re-Assessment sessions the following treatments were rendered) Pre-treatment Symptoms/Complaints:  \" I just feel bad \" 
Pain: Initial: numeric scale Pain Intensity 1: 2(about the same after therapy)  Post Session:    
 
 
Gait Training (23 Minutes):  Gait training to improve and/or restore physical functioning as related to mobility, strength, balance, coordination and dynamic movement of leg - bilateral to improve functional gait. Ambulated 1300 Feet (ft)(with 1 break) with Stand-by assistance using a (no AD) and minimal Safety awareness training related to their stride length and heel strike to promote proper body alignment, promote proper body posture and promote proper body mechanics. Therapeutic Exercise: ( ):  Exercises per grid below to improve mobility, strength, balance, coordination and dynamic movement of leg - bilateral and core to improve functional core stability & endurance. Required minimal verbal cues to promote proper body alignment, promote proper body posture and promote proper body mechanics. Progressed complexity of movement as indicated. Braces/Orthotics/Lines/Etc:  
· IV Treatment/Session Assessment:   
· Response to Treatment:   Pt doing well today · Interdisciplinary Collaboration:  
o Registered Nurse · After treatment position/precautions:  
o Supine in bed 
o Bed/Chair-wheels locked 
o Bed in low position 
o Call light within reach 
o RN notified``````````````````````````` · Compliance with Program/Exercises: Will assess as treatment progresses  no questions. · Recommendations/Intent for next treatment session: \"Next visit will focus on advancements to more challenging activities and reduction in assistance provided\". Total Treatment Duration: PT Patient Time In/Time Out Time In: 1000 Time Out: 1023 Viridiana Guzman, PTA

## 2019-04-15 NOTE — PROGRESS NOTES
Benadryl 12.5 mg given IVP slowly for c/o insomnia. Reminded to call for asst to be out of bed, rationale understood, agreed. Gown and bed linens soiled with urine, changed. IV site sore with redness noted. #20 jelco inserted into left outer forearm with good blood return, patent; TPN and Lipids resumed in new site. Dc'd IV from left inner forearm, catheter intact. Ostomy bag emptied of 200 ml liquid brown stool. No distress.

## 2019-04-15 NOTE — PROGRESS NOTES
Resting quietly, resp even, unlab. Eyes closed with relaxed facial expression. Awoke easily with no c/o. No distress.

## 2019-04-15 NOTE — PROGRESS NOTES
Admit Date: 3/30/2019 POD 11 days post op Procedure:  Procedure(s): EXPLORATORY LAPAROTOMY, LYSIS OF ADHESIONS, SMALL BOWEL RESECTION X1, ENTEROTOMY REPAIR X3, REPAIR OF CYSTOTOMY Subjective:  
 
Patient reports tolerating his CLD. No nausea or vomiting. NGT removed yesterday. No much ostomy output today. Objective:  
 
Visit Vitals /77 (BP 1 Location: Left arm, BP Patient Position: At rest;Head of bed elevated (Comment degrees)) Pulse 88 Temp 97.5 °F (36.4 °C) Resp 18 Ht 5' 8\" (1.727 m) Wt 123 lb 1.6 oz (55.8 kg) SpO2 98% BMI 18.72 kg/m² Temp (24hrs), Av.5 °F (36.4 °C), Min:95.9 °F (35.5 °C), Max:98.1 °F (36.7 °C) [place] I&O reviewed as documented. Physical Exam:  
 General-in no distress. Generalized weakness. Lungs- NC in place Heart- reg+PVCs Abdomen- Midline incision is clean, dry, no drainage. Pt has mild appropriately distributed yony-incisional tenderness. Ostomy in LLQ with minimal stool in bag. Labs:  
Recent Results (from the past 24 hour(s)) GLUCOSE, POC Collection Time: 19 11:23 AM  
Result Value Ref Range Glucose (POC) 109 (H) 65 - 100 mg/dL GLUCOSE, POC Collection Time: 04/15/19 12:40 AM  
Result Value Ref Range Glucose (POC) 119 (H) 65 - 100 mg/dL GLUCOSE, POC Collection Time: 04/15/19  5:42 AM  
Result Value Ref Range Glucose (POC) 112 (H) 65 - 100 mg/dL MAGNESIUM Collection Time: 04/15/19  5:43 AM  
Result Value Ref Range Magnesium 2.3 1.8 - 2.4 mg/dL PHOSPHORUS Collection Time: 04/15/19  5:43 AM  
Result Value Ref Range Phosphorus 5.1 (H) 2.3 - 3.7 MG/DL  
METABOLIC PANEL, BASIC Collection Time: 04/15/19  5:43 AM  
Result Value Ref Range Sodium 135 (L) 136 - 145 mmol/L Potassium 4.4 3.5 - 5.1 mmol/L Chloride 105 98 - 107 mmol/L  
 CO2 23 21 - 32 mmol/L Anion gap 7 mmol/L Glucose 109 (H) 65 - 100 mg/dL BUN 30 (H) 8 - 23 MG/DL  Creatinine 1.00 0.8 - 1.5 MG/DL  
 GFR est AA >60 >60 ml/min/1.73m2 GFR est non-AA >60 ml/min/1.73m2 Calcium 9.0 8.3 - 10.4 MG/DL Assessment:  
 
Principal Problem: 
  Small bowel obstruction ([address] (3/30/2019) Plan/Recommendations/Medical Decision Making:  
Cont CLD - go slow Lytes stable - mild hyponatremia 135 Continue PPN 
PRN ativan Has ambien, klonopin for sleep TSH normal 
OOB as tolerated PT ordered Reynaldo Martin MD 
[facility] [address] [place] [address] [facility] 4/15/2019 8:14 AM

## 2019-04-15 NOTE — PROGRESS NOTES
Awake. Incontinent of urine, saturated brief on floor, from pt. Clean brief applied, pt denying need for asst with yony care.

## 2019-04-15 NOTE — CDMP QUERY
Pt admitted with SBO and has malnutrition documented. Please further specify type of malnutrition. ? Malnutrition (mild, moderate, severe) ? Protein calorie malnutrition (mild, moderate, severe) ? Other (please specify) ? Unable to determine The medical record reflects the following: 
  Risk Factors: Surgery, Rectal cancer Clinical Indicators: Pt meets ASPEN MALNUTRITIION CRITERIA: chronic illness-moderate malnutrition: weight loss >5% 1 month, intake <75% estimated needs past month, muscle loss. Treatment: RD consult and Intervention:  
Meals and snacks: Clear Liquids; advance per MD recommendation as medical condition dictates. Nutritional Supplement Therapy: Electrolyte replacement per nutritional support protocols are active on MAR. PN: 1. Continue  PPN: 5%DEX/ 4.25%AA; decrease rate to 110 ml/hr with 250 ml 20% Lipids daily- provides 1397 kcal/d (79% of needs), 112 grams of protein/d (100% of needs; provides 2 gm/kg/BW), 132 grams of CHO/d (does not exceed maximum CHO load) and 2890 ml of total volume/d ( >100% of needs). Additives: Sodium 45 mEq/L; K 40 mEq/L, Ca 4.5 mEq/L, Mg 5 mEq/L and Phos 15 mEq/L   
2. Add TPN labs, POC Glucoses/SSI if Glucose > 180 mg/dl on AM BMP   Coordination of Nutrition Care: Sheryl Zhang, PharmD Thanks, 
ROSY CastellanosN, RN, CDS Compliant Documentation Management Program 
[phone]

## 2019-04-15 NOTE — PROGRESS NOTES
Resting quietly, resp even, unlab. Eyes closed with relaxed facial expression. Awoke easily with no c/o. No distress noted.

## 2019-04-15 NOTE — PROGRESS NOTES
Nutrition follow-up: 
Consult received for TPN management per nutrition support protocols/ Dr. Keyon Brewer 4/09. Reason for initial assessment: Length of stay Assessment:  
PN:  Dex 5% 4.25% amino acids at 110 ml/hr with 250 ml 20% lipids daily DIET CLEAR LIQUID  - consumes primarily water and small amounts of broth Food/Nutrition Patient History:  Pt presented with 1 week history of abdominal bloating and pain, decreased colostomy output. Past medical history notable for rectal cancer, ileostomy, colostomy in 2014. Central line access : Plan for PICC line placement 4/09. PICC team unable to place PICC. Peripheral line infiltrated this am, PPN and lipids held. Currently infusing via new peripheral line. POD 11 surgery/lysis of adhesions, small bowel resection. NGT-LIS: removed 4/14. Flat affect at RD visit, states \"I feel about the same. \"  Reports minimal interest in po intake. Bottled water at bedside <25% consumed. Abdomen: soft, active bowel sounds. +flatus, Colostomy output 250ml. Pertinent Labs: Na 135 (increase from 134), glucose 109 mg/dl. POC glucose 4/15: 112-119 mg/dl, Phos 5.1 (increase from 3.8). Anthropometrics:  (3/30) Height: 5' 8\" (172.7 cm),  Weight: 63.5 kg (140 lb), Weight Source: Patient stated, Body mass index is 21.29 kg/m². BMI class of underweight for Older American Male. BMI recommended (23-30) 4/10: Bed weight 62.6 kg 
4/13:  Bed weight 55.8 kg (123#) BMI 18.7 Macronutrient needs: re-estimated 4/13: 
EER:  3796-3379 kcal /day (32-28 kcal/kg BW) ZTA:   YRFNJ protein/day (1.5-2 grams/kg BW) GFR >60 Max CHO:  321 grams/day (4mg/kg BW/min) Fluid:  1ml/kcal 
Intake/Comparative Standards:  
Current Clear Liquid status does not meet kcal or protein needs PPN: 5%DEX/ 4.25%AA; at 110 ml/hr with 250 ml 20% Lipids daily- provides 1397 kcal/d (79% of needs), 112 grams of protein/d (100% of needs; provides 2 gm/kg/BW), 132 grams of CHO/d (does not exceed maximum CHO load) and 2890 ml of total volume/d ( >100% of needs). Intervention:  
Meals and snacks: Clear Liquids; advance per MD recommendation as medical condition dictates. Pt continues to decline all options on clear liquids other than water and broth. Nutritional Supplement Therapy: Electrolyte replacement per nutritional support protocols are active on MAR. PN: 1. Continue  PPN: 5%DEX/ 4.25%AA at 110 ml/hr with 250 ml 20% Lipids daily. 2. Additives: Sodium Chloride 45 mEq/L; 35 mEq/L Potassium Chloride, 5 mEq/L Potassium Phosphate, Ca gluconate 4.5 mEq/L, Mg Sulfate 5 mEq/L, MVI and TE. Modifications will decrease phosphorus content and maintain 40 mEq/L potassium. 2.  Add TPN labs, POC Glucoses/SSI if Glucose > 180 mg/dl Coordination of Nutrition Care:  Miguel Lizarraga, Pharmacist 
Discharge nutrition plan: Too soon to determine [place], [place] [address]

## 2019-04-15 NOTE — PROGRESS NOTES
Problem: Falls - Risk of 
Goal: *Absence of Falls Description Document Lanny Rosales Fall Risk and appropriate interventions in the flowsheet. Outcome: Progressing Towards Goal 
  
Problem: Pressure Injury - Risk of 
Goal: *Prevention of pressure injury Description Document Josef Scale and appropriate interventions in the flowsheet. Outcome: Progressing Towards Goal 
  
Problem: Patient Education: Go to Patient Education Activity Goal: Patient/Family Education Outcome: Progressing Towards Goal 
  
Problem: Patient Education: Go to Patient Education Activity Goal: Patient/Family Education Outcome: Progressing Towards Goal 
  
Problem: Ostomy Care Goal: *Patient pouching appliance will fit properly and maintain integrity at least three to five days Description Infection control procedures (eg, clean dressings, clean gloves, hand washing, precautions to isolate wound from contamination, sterile instruments used for wound debridement) should be implemented. Outcome: Progressing Towards Goal 
Goal: *Acceptance of change in body image Outcome: Progressing Towards Goal

## 2019-04-16 LAB
ANION GAP SERPL CALC-SCNC: 7 MMOL/L
BUN SERPL-MCNC: 35 MG/DL (ref 8–23)
CALCIUM SERPL-MCNC: 9.2 MG/DL (ref 8.3–10.4)
CHLORIDE SERPL-SCNC: 108 MMOL/L (ref 98–107)
CO2 SERPL-SCNC: 21 MMOL/L (ref 21–32)
CREAT SERPL-MCNC: 1.06 MG/DL (ref 0.8–1.5)
GLUCOSE BLD STRIP.AUTO-MCNC: 100 MG/DL (ref 65–100)
GLUCOSE BLD STRIP.AUTO-MCNC: 107 MG/DL (ref 65–100)
GLUCOSE BLD STRIP.AUTO-MCNC: 121 MG/DL (ref 65–100)
GLUCOSE BLD STRIP.AUTO-MCNC: 123 MG/DL (ref 65–100)
GLUCOSE SERPL-MCNC: 115 MG/DL (ref 65–100)
POTASSIUM SERPL-SCNC: 4.6 MMOL/L (ref 3.5–5.1)
SODIUM SERPL-SCNC: 136 MMOL/L (ref 136–145)

## 2019-04-16 PROCEDURE — 74011000258 HC RX REV CODE- 258: Performed by: SURGERY

## 2019-04-16 PROCEDURE — 97116 GAIT TRAINING THERAPY: CPT

## 2019-04-16 PROCEDURE — 82962 GLUCOSE BLOOD TEST: CPT

## 2019-04-16 PROCEDURE — 74011250636 HC RX REV CODE- 250/636: Performed by: SURGERY

## 2019-04-16 PROCEDURE — 74011250637 HC RX REV CODE- 250/637: Performed by: SURGERY

## 2019-04-16 PROCEDURE — 65270000029 HC RM PRIVATE

## 2019-04-16 PROCEDURE — C9113 INJ PANTOPRAZOLE SODIUM, VIA: HCPCS | Performed by: SURGERY

## 2019-04-16 PROCEDURE — 80048 BASIC METABOLIC PNL TOTAL CA: CPT

## 2019-04-16 PROCEDURE — 74011000250 HC RX REV CODE- 250: Performed by: SURGERY

## 2019-04-16 PROCEDURE — 36415 COLL VENOUS BLD VENIPUNCTURE: CPT

## 2019-04-16 RX ADMIN — Medication 1 AMPULE: at 08:19

## 2019-04-16 RX ADMIN — AMITRIPTYLINE HYDROCHLORIDE 100 MG: 50 TABLET, FILM COATED ORAL at 22:37

## 2019-04-16 RX ADMIN — ZOLPIDEM TARTRATE 5 MG: 5 TABLET ORAL at 02:07

## 2019-04-16 RX ADMIN — VITAMIN D, TAB 1000IU (100/BT) 2000 UNITS: 25 TAB at 17:12

## 2019-04-16 RX ADMIN — PANTOPRAZOLE SODIUM 40 MG: 40 INJECTION, POWDER, FOR SOLUTION INTRAVENOUS at 08:19

## 2019-04-16 RX ADMIN — OLANZAPINE 5 MG: 2.5 TABLET, FILM COATED ORAL at 02:07

## 2019-04-16 RX ADMIN — CALCIUM GLUCONATE: 98 INJECTION, SOLUTION INTRAVENOUS at 17:31

## 2019-04-16 RX ADMIN — LEVOTHYROXINE SODIUM 125 MCG: 125 TABLET ORAL at 06:48

## 2019-04-16 RX ADMIN — Medication 1 AMPULE: at 21:27

## 2019-04-16 RX ADMIN — CLONAZEPAM 4 MG: 1 TABLET ORAL at 22:37

## 2019-04-16 RX ADMIN — I.V. FAT EMULSION 250 ML: 20 EMULSION INTRAVENOUS at 17:31

## 2019-04-16 RX ADMIN — VITAMIN D, TAB 1000IU (100/BT) 2000 UNITS: 25 TAB at 08:18

## 2019-04-16 RX ADMIN — OLANZAPINE 2.5 MG: 2.5 TABLET, FILM COATED ORAL at 22:37

## 2019-04-16 NOTE — PROGRESS NOTES
Problem: Falls - Risk of 
Goal: *Absence of Falls Description Document Hilary Hoagusto Fall Risk and appropriate interventions in the flowsheet. Outcome: Progressing Towards Goal 
  
Problem: Pressure Injury - Risk of 
Goal: *Prevention of pressure injury Description Document Josef Scale and appropriate interventions in the flowsheet. Outcome: Progressing Towards Goal 
  
Problem: Patient Education: Go to Patient Education Activity Goal: Patient/Family Education Outcome: Progressing Towards Goal 
  
Problem: Nutrition Deficit Goal: *Optimize nutritional status Outcome: Progressing Towards Goal 
  
Problem: Patient Education: Go to Patient Education Activity Goal: Patient/Family Education Outcome: Progressing Towards Goal

## 2019-04-16 NOTE — PROGRESS NOTES
Problem: Mobility Impaired (Adult and Pediatric) Goal: *Acute Goals and Plan of Care (Insert Text) DISCHARGE GOALS / RE-ASSESSMENT 4/14/19 
(1.)Mr. Marroquin will move from supine to sit and sit to supine  in bed with MODIFIED INDEPENDENT. (2.)Mr. Marroquin will transfer from bed to chair and chair to bed with SUPERVISION (consistently) using the least restrictive device. (3.)Mr. Marroquin will ambulate with SUPERVISION (consistently) for 650 feet with the least restrictive device. (4)Mr. Marroquin will perform HEP with written guidelines 3 x a day. (5)Mr. Marroquin will go up a flight of steps CGA with rail & SUPERVISION. ________________________________________________________________________________________________ Outcome: Progressing Towards Goal 
  
PHYSICAL THERAPY: Daily Note and PM 4/16/2019 INPATIENT: PT Visit Days : 3 [place] / [place] PART [place] B / Product Type: Medicare /   
  
NAME/AGE/GENDER: Pj Rosario is a 74 y.o. male PRIMARY DIAGNOSIS: Small bowel obstruction ([facility] [K56.609] Small bowel obstruction ([address] Small bowel obstruction ([facility] Procedure(s) (LRB): 
EXPLORATORY LAPAROTOMY, LYSIS OF ADHESIONS, SMALL BOWEL RESECTION X1, ENTEROTOMY REPAIR X3, REPAIR OF CYSTOTOMY (N/A) 13 Days Post-Op ICD-10: Treatment Diagnosis:  
 · Generalized Muscle Weakness (M62.81) · Other abnormalities of gait and mobility (R26.89) Precaution/Allergies: 
Patient has no known allergies. ASSESSMENT:  
Mr. Cherry Barnes presents with general weakness and decreased functional mobility s/p EXPLORATORY LAPAROTOMY, LYSIS OF ADHESIONS, SMALL BOWEL RESECTION X1, ENTEROTOMY REPAIR X3, REPAIR OF CYSTOTOMY on 4/3/19. He lives alone and was independent prior to hospitalization. He plans to go home. 4/15 pm he walk with therapist 650 ft without AD, then walk another 650 ft with nursing. He return to his room. He remains in his bed with call light near. This section established at most recent assessment PROBLEM LIST (Impairments causing functional limitations): 1. Decreased Strength 2. Decreased ADL/Functional Activities 3. Decreased Transfer Abilities 4. Decreased Ambulation Ability/Technique 5. Decreased Balance 6. Decreased Activity Tolerance 7. Increased Fatigue 8. Increased Shortness of Breath 9. Decreased Flexibility/Joint Mobility 10. Decreased [place] with Home Exercise Program 
 INTERVENTIONS PLANNED: (Benefits and precautions of physical therapy have been discussed with the patient.) 1. Balance Exercise 2. Bed Mobility 3. Family Education 4. Gait Training 5. Home Exercise Program (HEP) 6. Range of Motion (ROM) 7. Therapeutic Activites 8. Therapeutic Exercise/Strengthening 9. Transfer Training TREATMENT PLAN: Frequency/Duration: daily for duration of hospital stay Rehabilitation Potential For Stated Goals: Good RECOMMENDED REHABILITATION/EQUIPMENT: (at time of discharge pending progress): Due to the probability of continued deficits (see above) this patient will likely need continued skilled physical therapy after discharge. Equipment:  
? None at this time HISTORY:  
History of Present Injury/Illness (Reason for Referral): EXPLORATORY LAPAROTOMY, LYSIS OF ADHESIONS, SMALL BOWEL RESECTION X1, ENTEROTOMY REPAIR X3, REPAIR OF CYSTOTOMY on 4/9/19.  
 
Past Medical History/Comorbidities:  
Mr. Óscar Radford  has a past medical history of Family history of malignant neoplasm of gastrointestinal tract, Fecal incontinence, Former cigarette smoker, History of rectal cancer, Hypothyroid, Infection and inflammatory reaction due to other internal prosthetic devices, implants and grafts, subsequent encounter (2017), Insomnia, Osteoarthritis, hand, Personal history of colonic polyps (9/2013), Personal history of malignant neoplasm of rectum, rectosigmoid junction, and anus (9/2013), and Psychiatric disorder. He also has no past medical history of Adverse effect of anesthesia, Aneurysm ([address] Arrhythmia, Asthma, Autoimmune disease ([address] CAD (coronary artery disease), Chronic kidney disease, Chronic obstructive pulmonary disease ([address] Chronic pain, Coagulation disorder ([address] Diabetes ([address] Difficult intubation, Endocarditis, GERD (gastroesophageal reflux disease), Heart failure ([address] Hypertension, Ill-defined condition, Liver disease, Malignant hyperthermia due to anesthesia, Morbid obesity ([address] Nausea & vomiting, Other ill-defined conditions(799.89), Pseudocholinesterase deficiency, PUD (peptic ulcer disease), Rheumatic fever, Seizures ([address] Stroke ([address] Thromboembolus ([address] Unspecified adverse effect of anesthesia, or Unspecified sleep apnea. Mr. Tari Mo  has a past surgical history that includes endoscopy, colon, diagnostic (last 2/3/15); hx polypectomy; hx other surgical (10/7/2013); hx colectomy (11/2013); hx colectomy (Right, 8/2014); hx hernia repair (3/2015, 5/2016); hx gi (4/2016); hx colostomy (5/11/16); hx hernia repair; hx other surgical (Bilateral); colonoscopy thru stoma (2/12/2018); colonoscopy (N/A, 2/12/2018); and hx vascular access (Left, 4/14). Social History/Living Environment:  
Home Environment: Other (comment)([place]/condo) One/Two Story Residence: Two story Living Alone: Yes Support Systems: Family member(s), Friends \ neighbors Patient Expects to be Discharged to[de-identified] Other (comment)([place]/condo) Current DME Used/Available at Home: None Prior Level of Function/Work/Activity: 
independent Number of Personal Factors/Comorbidities that affect the Plan of Care: 1-2: MODERATE COMPLEXITY EXAMINATION:  
Most Recent Physical Functioning:  
Gross Assessment: 
  
         
  
 
  
Balance: 
  Bed Mobility: 
Supine to Sit: Stand-by assistance Sit to Supine: Stand-by assistance Scooting: Stand-by assistance Transfers: 
Sit to Stand: Stand-by assistance Stand to Sit: Stand-by assistance Bed to Chair: Stand-by assistance Gait:  
  
Speed/Alicia: Delayed Step Length: Left shortened;Right shortened Gait Abnormalities: Decreased step clearance Distance (ft): 650 Feet (ft) Ambulation - Level of Assistance: Stand-by assistance Interventions: Safety awareness training Duration: 15 Minutes Body Structures Involved: 1. Bones 2. Joints 3. Muscles 4. Ligaments Body Functions Affected: 1. Movement Related Activities and Participation Affected: 1. Mobility Number of elements that affect the Plan of Care: 3: MODERATE COMPLEXITY CLINICAL PRESENTATION:  
Presentation: Stable and uncomplicated: LOW COMPLEXITY CLINICAL DECISION [address] [place] [address] AM-PAC 6 Clicks Basic Mobility Inpatient Short Form How much difficulty does the patient currently have. .. Unable A Lot A Little None 1. Turning over in bed (including adjusting bedclothes, sheets and blankets)? ? 1   ? 2   ? 3   ? 4  
2. Sitting down on and standing up from a chair with arms ( e.g., wheelchair, bedside commode, etc.)   ? 1   ? 2   ? 3   ? 4  
3. Moving from lying on back to sitting on the side of the bed?   ? 1   ? 2   ? 3   ? 4 How much help from another person does the patient currently need. .. Total A Lot A Little None 4. Moving to and from a bed to a chair (including a wheelchair)? ? 1   ? 2   ? 3   ? 4  
5. Need to walk in hospital room? ? 1   ? 2   ? 3   ? 4  
6. Climbing 3-5 steps with a railing? ? 1   ? 2   ? 3   ? 4  
© , Trustees of [address] [place] [address], under license to [employer]. All rights reserved Score:  Initial: 18 Most Recent: X (Date: -- ) Interpretation of Tool:  Represents activities that are increasingly more difficult (i.e. Bed mobility, Transfers, Gait). Medical Necessity:    
· Patient is expected to demonstrate progress in strength, range of motion and balance ·  to decrease assistance required with theraputic exercises and functional mobility · . Reason for Services/Other Comments: 
· Patient continues to require present interventions due to patient's inability to perform theraputic exercises and functional mobility · . Use of outcome tool(s) and clinical judgement create a POC that gives a: Clear prediction of patient's progress: LOW COMPLEXITY  
  
 
 
 
TREATMENT:  
(In addition to Assessment/Re-Assessment sessions the following treatments were rendered) Pre-treatment Symptoms/Complaints:  \" I just feel bad \" 
Pain: Initial: numeric scale Pain Intensity 1: 0  Post Session:    
 
 
Gait Training (15 Minutes):  Gait training to improve and/or restore physical functioning as related to mobility, strength, balance, coordination and dynamic movement of leg - bilateral to improve functional gait. Ambulated 650 Feet (ft) with Stand-by assistance using a   and minimal Safety awareness training related to their stride length and heel strike to promote proper body alignment, promote proper body posture and promote proper body mechanics. Therapeutic Exercise: (0 Minutes):  Exercises per grid below to improve mobility, strength, balance, coordination and dynamic movement of leg - bilateral and core to improve functional core stability & endurance. Required minimal verbal cues to promote proper body alignment, promote proper body posture and promote proper body mechanics. Progressed complexity of movement as indicated. Braces/Orthotics/Lines/Etc:  
· IV Treatment/Session Assessment:   
· Response to Treatment:   Pt doing well today · Interdisciplinary Collaboration:  
o Registered Nurse · After treatment position/precautions:  
o Supine in bed 
o Bed/Chair-wheels locked 
o Bed in low position 
o Call light within reach 
o RN notified``````````````````````````` · Compliance with Program/Exercises: Will assess as treatment progresses  no questions. · Recommendations/Intent for next treatment session: \"Next visit will focus on advancements to more challenging activities and reduction in assistance provided\". Total Treatment Duration: PT Patient Time In/Time Out Time In: 4552 Time Out: 8737 Viridiana Guzman, PTA

## 2019-04-16 NOTE — PROGRESS NOTES
Shift assessment complete via flowsheet. A&O. Denies pain/discomfort at present. Midline incision to abdomen intact with staples and open to air. Ostomy appliance intact with loose brown stool noted. No acute distress. Encouraged to call for assistance/needs.

## 2019-04-16 NOTE — PROGRESS NOTES
[address], [address] [place], [address] PLAN:Full liquid diet Ambulate Continue TPN 
 
 
ASSESSMENT:  Admit Date: 3/30/2019  
13 Days Post-Op  Procedure(s): EXPLORATORY LAPAROTOMY, LYSIS OF ADHESIONS, SMALL BOWEL RESECTION X1, ENTEROTOMY REPAIR X3, REPAIR OF CYSTOTOMY Principal Problem: 
  Small bowel obstruction ([address] (3/30/2019) SUBJECTIVE:Complains of bloating. Wants to try full liquids. Reports liquid BM. No F/N/V. OBJECTIVE: 
Constitutional: Alert oriented cooperative patient in no acute distress; appears stated age Visit Vitals /79 (BP 1 Location: Left arm, BP Patient Position: At rest;Supine) Pulse 85 Temp 98.4 °F (36.9 °C) Resp 18 Ht 5' 8\" (1.727 m) Wt 125 lb 10.6 oz (57 kg) SpO2 90% BMI 19.11 kg/m² Eyes:Sclera are clear. ENMT: no external lesions gross hearing normal; no obvious neck masses, no ear or lip lesions CV: RRR. Normal perfusion Resp: No JVD. Breathing is  non-labored; no audible wheezing. GI: soft. Ostomy with stool. Incision Clean dry and intact. Musculoskeletal: unremarkable with normal function. No embolic signs or cyanosis. Neuro:  Oriented; moves all 4; no focal deficits Psychiatric: normal affect and mood, no memory impairment Patient Vitals for the past 24 hrs: 
 BP Temp Pulse Resp SpO2 Weight 04/16/19 1138 164/79 98.4 °F (36.9 °C) 85 18 90 %   
04/16/19 0750 155/82 98.6 °F (37 °C) 87 18 93 %   
04/16/19 0636      125 lb 10.6 oz (57 kg) 04/16/19 0235 129/74 98.5 °F (36.9 °C) 88 16 94 %   
04/16/19 0146 146/70 99.8 °F (37.7 °C) 89 12 93 %   
04/15/19 2102 160/89 98.1 °F (36.7 °C) 82 20 92 %   
04/15/19 1536 147/78 98.1 °F (36.7 °C) 79 18 97 %  Labs:   
Recent Labs 04/16/19 
3578   
K 4.6 * CO2 21 BUN 35* CREA 1.06  
* Tony Hays,

## 2019-04-16 NOTE — PROGRESS NOTES
Nutrition follow-up: 
Consult received for TPN management per nutrition support protocols/ Dr. Cate Singh 4/09. Reason for initial assessment: Length of stay Assessment:  
PN:  Dex 5% 4.25% amino acids at 110 ml/hr with 250 ml 20% lipids daily DIET CLEAR LIQUID  - consumes primarily water and small amounts of broth Food/Nutrition Patient History:  Pt presented with 1 week history of abdominal bloating and pain, decreased colostomy output. Past medical history notable for rectal cancer, ileostomy, colostomy in 2014. Central line access : Plan for PICC line placement 4/09. PICC team unable to place PICC. POD 12 surgery/lysis of adhesions, small bowel resection. NGT-LIS: removed 4/14. At visit today, pt complains of limited selections on clear liquid diet. Discussed all options available pt continues to decline all available other than bottled water and broth indicating he is tired of broth. He requests diet progression as he wants to include yogurt, milk and cream soups as well. Discussed with him, diet progression per surgeon. Abdomen: soft, active bowel sounds. +flatus, Colostomy output 300ml primarily liquid per RN estimating only ~100ml included liquid stool. Current PPN is max chloride. If chloride continues to trend up consider modification of chloride:acetate ratio. Pertinent Labs: Na 136 (corrected), glucose 115 mg/dl. POC glucose 4/15: 105-119 mg/dl, 4/16 121-123 mg/dl; K 4.6 (trending up), Chloride 108 (trending up). Anthropometrics:  (3/30) Height: 5' 8\" (172.7 cm),  Weight: 63.5 kg (140 lb), Weight Source: Patient stated, Body mass index is 21.29 kg/m². BMI class of underweight for Older American Male. BMI recommended (23-30) 4/10: Bed weight 62.6 kg 
4/13:  Bed weight 55.8 kg (123#) BMI 18.7 
4/16: Weight: 57 kg (125 lb 10.6 oz) bed source Macronutrient needs: re-estimated 4/13: 
EER:  1313-2990 kcal /day (32-28 kcal/kg BW) YNA:   CQQLU protein/day (1.5-2 grams/kg BW) GFR >60 Max CHO:  321 grams/day (4mg/kg BW/min) Fluid:  1ml/kcal 
Intake/Comparative Standards:  
Current Clear Liquid status does not meet kcal or protein needs PPN: 5%DEX/ 4.25%AA; at 110 ml/hr with 250 ml 20% Lipids daily- provides 1397 kcal/d (79% of needs), 112 grams of protein/d (100% of needs; provides 2 gm/kg/BW), 132 grams of CHO/d (does not exceed maximum CHO load) and 2890 ml of total volume/d ( >100% of needs). Intervention:  
Meals and snacks: Clear Liquids; advance per MD recommendation as medical condition dictates. Pt with limited selections of clears he will accept, requesting diet progression to fulls at RD visit. Nutritional Supplement Therapy: Electrolyte replacement per nutritional support protocols are active on MAR. PN: 1. Continue  PPN: 5%DEX/ 4.25%AA at 110 ml/hr with 250 ml 20% Lipids daily. 2. Additives: Sodium Chloride 45 mEq/L; 30 mEq/L Potassium Chloride, 5 mEq/L Potassium Phosphate, Ca gluconate 4.5 mEq/L, Mg Sulfate 5 mEq/L, MVI and TE. Modifications will decrease 35 mEq/L potassium. 2.  TPN labs, POC Glucoses/SSI if Glucose > 180 mg/dl Coordination of Nutrition Care:  Shilpa Arreola Pharmacist 
Discharge nutrition plan: Too soon to determine [place], [place], [place] [address]

## 2019-04-16 NOTE — PROGRESS NOTES
Assessment complete via flow sheet. Pt A&Ox3. Respirations even and unlabored. S1 S2 auscultated. Pt on room air. Pt reports pain level of 0 out of 10. Bowel sounds hypoactive, abdomen semi soft. Pt has ostomy with brown liquid output, small amount. Pt has midline incision with staples, CDI. Pt upset about liquid, is not happy with options. Pt refuses colace due to ostomy consistency. Denies other needs. Bed in lowest position, side rails up 3, call bell in reach. Instructed to call for assistance. Pt verbalized understanding. Plan of care reviewed with patient.

## 2019-04-16 NOTE — PROGRESS NOTES
Shift assessment complete via flowsheet. A&O. Denies pain/discomfort. Encouraged to call for assistance/needs. No acute distress noted.

## 2019-04-17 LAB
ANION GAP SERPL CALC-SCNC: 6 MMOL/L
BUN SERPL-MCNC: 26 MG/DL (ref 8–23)
CALCIUM SERPL-MCNC: 9.2 MG/DL (ref 8.3–10.4)
CHLORIDE SERPL-SCNC: 103 MMOL/L (ref 98–107)
CO2 SERPL-SCNC: 23 MMOL/L (ref 21–32)
CREAT SERPL-MCNC: 0.81 MG/DL (ref 0.8–1.5)
GLUCOSE BLD STRIP.AUTO-MCNC: 103 MG/DL (ref 65–100)
GLUCOSE BLD STRIP.AUTO-MCNC: 105 MG/DL (ref 65–100)
GLUCOSE BLD STRIP.AUTO-MCNC: 118 MG/DL (ref 65–100)
GLUCOSE BLD STRIP.AUTO-MCNC: 99 MG/DL (ref 65–100)
GLUCOSE SERPL-MCNC: 105 MG/DL (ref 65–100)
MAGNESIUM SERPL-MCNC: 1.5 MG/DL (ref 1.8–2.4)
PHOSPHATE SERPL-MCNC: 4.1 MG/DL (ref 2.3–3.7)
POTASSIUM SERPL-SCNC: 4.6 MMOL/L (ref 3.5–5.1)
SODIUM SERPL-SCNC: 132 MMOL/L (ref 136–145)

## 2019-04-17 PROCEDURE — 84100 ASSAY OF PHOSPHORUS: CPT

## 2019-04-17 PROCEDURE — 74011250636 HC RX REV CODE- 250/636: Performed by: SURGERY

## 2019-04-17 PROCEDURE — 74011250637 HC RX REV CODE- 250/637: Performed by: SURGERY

## 2019-04-17 PROCEDURE — 97116 GAIT TRAINING THERAPY: CPT

## 2019-04-17 PROCEDURE — 74011000250 HC RX REV CODE- 250: Performed by: SURGERY

## 2019-04-17 PROCEDURE — 83735 ASSAY OF MAGNESIUM: CPT

## 2019-04-17 PROCEDURE — C9113 INJ PANTOPRAZOLE SODIUM, VIA: HCPCS | Performed by: SURGERY

## 2019-04-17 PROCEDURE — 65270000029 HC RM PRIVATE

## 2019-04-17 PROCEDURE — 74011000258 HC RX REV CODE- 258: Performed by: SURGERY

## 2019-04-17 PROCEDURE — 36415 COLL VENOUS BLD VENIPUNCTURE: CPT

## 2019-04-17 PROCEDURE — 80048 BASIC METABOLIC PNL TOTAL CA: CPT

## 2019-04-17 PROCEDURE — 82962 GLUCOSE BLOOD TEST: CPT

## 2019-04-17 RX ORDER — MAGNESIUM SULFATE HEPTAHYDRATE 40 MG/ML
2 INJECTION, SOLUTION INTRAVENOUS ONCE
Status: COMPLETED | OUTPATIENT
Start: 2019-04-17 | End: 2019-04-17

## 2019-04-17 RX ADMIN — LEVOTHYROXINE SODIUM 125 MCG: 125 TABLET ORAL at 06:29

## 2019-04-17 RX ADMIN — MAGNESIUM SULFATE IN WATER 2 G: 40 INJECTION, SOLUTION INTRAVENOUS at 12:20

## 2019-04-17 RX ADMIN — Medication 1 AMPULE: at 09:03

## 2019-04-17 RX ADMIN — ZOLPIDEM TARTRATE 5 MG: 5 TABLET ORAL at 02:20

## 2019-04-17 RX ADMIN — CLONAZEPAM 4 MG: 1 TABLET ORAL at 22:11

## 2019-04-17 RX ADMIN — AMITRIPTYLINE HYDROCHLORIDE 100 MG: 50 TABLET, FILM COATED ORAL at 22:11

## 2019-04-17 RX ADMIN — VITAMIN D, TAB 1000IU (100/BT) 2000 UNITS: 25 TAB at 17:58

## 2019-04-17 RX ADMIN — VITAMIN D, TAB 1000IU (100/BT) 2000 UNITS: 25 TAB at 09:04

## 2019-04-17 RX ADMIN — CALCIUM GLUCONATE: 98 INJECTION, SOLUTION INTRAVENOUS at 17:59

## 2019-04-17 RX ADMIN — OLANZAPINE 2.5 MG: 2.5 TABLET, FILM COATED ORAL at 22:12

## 2019-04-17 RX ADMIN — Medication 1 AMPULE: at 21:20

## 2019-04-17 RX ADMIN — OLANZAPINE 5 MG: 2.5 TABLET, FILM COATED ORAL at 02:20

## 2019-04-17 RX ADMIN — I.V. FAT EMULSION 250 ML: 20 EMULSION INTRAVENOUS at 18:00

## 2019-04-17 RX ADMIN — PANTOPRAZOLE SODIUM 40 MG: 40 INJECTION, POWDER, FOR SOLUTION INTRAVENOUS at 09:03

## 2019-04-17 NOTE — PROGRESS NOTES
Problem: Mobility Impaired (Adult and Pediatric) Goal: *Acute Goals and Plan of Care (Insert Text) DISCHARGE GOALS / RE-ASSESSMENT 4/14/19 
(1.)Mr. Marroquin will move from supine to sit and sit to supine  in bed with MODIFIED INDEPENDENT. GOAL MET 
(2.)Mr. Marroquin will transfer from bed to chair and chair to bed with SUPERVISION (consistently) using the least restrictive device. GOAL MET 
(3.)Mr. Marroquin will ambulate with SUPERVISION (consistently) for 650 feet with the least restrictive device. GOAL MET 
(4)Mr. Marroquin will perform HEP with written guidelines 3 x a day. GOAL MET 
(5)Mr. Marroquin will go up a flight of steps CGA with rail & SUPERVISION. GOAL MET 
________________________________________________________________________________________________ Outcome: Progressing Towards Goal 
  
PHYSICAL THERAPY: Daily Note, Discharge and PM 4/17/2019 INPATIENT: PT Visit Days : 3 [place] / [place] A [place] B / Product Type: Medicare /   
  
NAME/AGE/GENDER: Ismael Barillas is a 74 y.o. male PRIMARY DIAGNOSIS: Small bowel obstruction ([address] [K56.609] Small bowel obstruction ([address] Small bowel obstruction ([address] Procedure(s) (LRB): 
EXPLORATORY LAPAROTOMY, LYSIS OF ADHESIONS, SMALL BOWEL RESECTION X1, ENTEROTOMY REPAIR X3, REPAIR OF CYSTOTOMY (N/A) 14 Days Post-Op ICD-10: Treatment Diagnosis:  
 · Generalized Muscle Weakness (M62.81) · Other abnormalities of gait and mobility (R26.89) Precaution/Allergies: 
Patient has no known allergies. ASSESSMENT:  
Mr. Kaden Whitt presents with general weakness and decreased functional mobility s/p EXPLORATORY LAPAROTOMY, LYSIS OF ADHESIONS, SMALL BOWEL RESECTION X1, ENTEROTOMY REPAIR X3, REPAIR OF CYSTOTOMY on 4/3/19. He lives alone and was independent prior to hospitalization. He plans to go home.   
4/15 pm he walk with therapist 650 ft without AD twice with therapist pushing pole with SBA. Returned to bed with everything hooked back up and nursing notified. This section established at most recent assessment PROBLEM LIST (Impairments causing functional limitations): 1. Decreased Strength 2. Decreased ADL/Functional Activities 3. Decreased Transfer Abilities 4. Decreased Ambulation Ability/Technique 5. Decreased Balance 6. Decreased Activity Tolerance 7. Increased Fatigue 8. Increased Shortness of Breath 9. Decreased Flexibility/Joint Mobility 10. Decreased [place] with Home Exercise Program 
 INTERVENTIONS PLANNED: (Benefits and precautions of physical therapy have been discussed with the patient.) 1. Balance Exercise 2. Bed Mobility 3. Family Education 4. Gait Training 5. Home Exercise Program (HEP) 6. Range of Motion (ROM) 7. Therapeutic Activites 8. Therapeutic Exercise/Strengthening 9. Transfer Training TREATMENT PLAN: Frequency/Duration: daily for duration of hospital stay Rehabilitation Potential For Stated Goals: Good RECOMMENDED REHABILITATION/EQUIPMENT: (at time of discharge pending progress): Due to the probability of continued deficits (see above) this patient will likely need continued skilled physical therapy after discharge. Equipment:  
? None at this time HISTORY:  
History of Present Injury/Illness (Reason for Referral): EXPLORATORY LAPAROTOMY, LYSIS OF ADHESIONS, SMALL BOWEL RESECTION X1, ENTEROTOMY REPAIR X3, REPAIR OF CYSTOTOMY on 4/9/19.  
 
Past Medical History/Comorbidities:  
Mr. Aleksander Mendez  has a past medical history of Family history of malignant neoplasm of gastrointestinal tract, Fecal incontinence, Former cigarette smoker, History of rectal cancer, Hypothyroid, Infection and inflammatory reaction due to other internal prosthetic devices, implants and grafts, subsequent encounter (2017), Insomnia, Osteoarthritis, hand, Personal history of colonic polyps (9/2013), Personal history of malignant neoplasm of rectum, rectosigmoid junction, and anus (9/2013), and Psychiatric disorder. He also has no past medical history of Adverse effect of anesthesia, Aneurysm ([address] Arrhythmia, Asthma, Autoimmune disease ([address] CAD (coronary artery disease), Chronic kidney disease, Chronic obstructive pulmonary disease ([address] Chronic pain, Coagulation disorder ([address] Diabetes ([address] Difficult intubation, Endocarditis, GERD (gastroesophageal reflux disease), Heart failure ([address] Hypertension, Ill-defined condition, Liver disease, Malignant hyperthermia due to anesthesia, Morbid obesity ([address] Nausea & vomiting, Other ill-defined conditions(799.89), Pseudocholinesterase deficiency, PUD (peptic ulcer disease), Rheumatic fever, Seizures ([address] Stroke ([address] Thromboembolus ([address] Unspecified adverse effect of anesthesia, or Unspecified sleep apnea. Mr. Brenda Pecaock  has a past surgical history that includes endoscopy, colon, diagnostic (last 2/3/15); hx polypectomy; hx other surgical (10/7/2013); hx colectomy (11/2013); hx colectomy (Right, 8/2014); hx hernia repair (3/2015, 5/2016); hx gi (4/2016); hx colostomy (5/11/16); hx hernia repair; hx other surgical (Bilateral); colonoscopy thru stoma (2/12/2018); colonoscopy (N/A, 2/12/2018); and hx vascular access (Left, 4/14). Social History/Living Environment:  
Home Environment: Other (comment)([email or web address]) One/Two Story Residence: Two story Living Alone: Yes Support Systems: Family member(s), Friends \ neighbors Patient Expects to be Discharged to[de-identified] Other (comment)([facility]/condo) Current DME Used/Available at Home: None Prior Level of Function/Work/Activity: 
independent Number of Personal Factors/Comorbidities that affect the Plan of Care: 1-2: MODERATE COMPLEXITY EXAMINATION:  
Most Recent Physical Functioning:  
Gross Assessment: 
  
         
  
 
  
Balance: 
  Bed Mobility: 
Supine to Sit: Stand-by assistance Sit to Supine: Stand-by assistance Scooting: Stand-by assistance Transfers: 
Sit to Stand: Stand-by assistance Stand to Sit: Stand-by assistance Bed to Chair: Stand-by assistance Duration: 0 Minutes Gait:  
  
Speed/Alicia: Delayed Step Length: Left shortened;Right shortened Gait Abnormalities: Decreased step clearance Distance (ft): 1300 Feet (ft) Ambulation - Level of Assistance: Stand-by assistance Number of Stairs Trained: 11(with railings foot over foot-) Stairs - Level of Assistance: Contact guard assistance Interventions: Safety awareness training Duration: 30 Minutes Body Structures Involved: 1. Bones 2. Joints 3. Muscles 4. Ligaments Body Functions Affected: 1. Movement Related Activities and Participation Affected: 1. Mobility Number of elements that affect the Plan of Care: 3: MODERATE COMPLEXITY CLINICAL PRESENTATION:  
Presentation: Stable and uncomplicated: LOW COMPLEXITY CLINICAL DECISION [facility] [place] [id number] AM-PAC 6 Clicks Basic Mobility Inpatient Short Form How much difficulty does the patient currently have. .. Unable A Lot A Little None 1. Turning over in bed (including adjusting bedclothes, sheets and blankets)? ? 1   ? 2   ? 3   ? 4  
2. Sitting down on and standing up from a chair with arms ( e.g., wheelchair, bedside commode, etc.)   ? 1   ? 2   ? 3   ? 4  
3. Moving from lying on back to sitting on the side of the bed?   ? 1   ? 2   ? 3   ? 4 How much help from another person does the patient currently need. .. Total A Lot A Little None 4. Moving to and from a bed to a chair (including a wheelchair)? ? 1   ? 2   ? 3   ? 4  
5. Need to walk in hospital room? ? 1   ? 2   ? 3   ? 4  
6. Climbing 3-5 steps with a railing? ? 1   ? 2   ? 3   ? 4  
© 2007, Trustees of [address] [place] [id number], under license to [employer]. All rights reserved Score:  Initial: 18 Most Recent: X (Date: -- ) Interpretation of Tool:  Represents activities that are increasingly more difficult (i.e. Bed mobility, Transfers, Gait). Medical Necessity:    
· Patient is expected to demonstrate progress in strength, range of motion and balance ·  to decrease assistance required with theraputic exercises and functional mobility · . Reason for Services/Other Comments: 
· Patient continues to require present interventions due to patient's inability to perform theraputic exercises and functional mobility · . Use of outcome tool(s) and clinical judgement create a POC that gives a: Clear prediction of patient's progress: LOW COMPLEXITY  
  
 
 
 
TREATMENT:  
(In addition to Assessment/Re-Assessment sessions the following treatments were rendered) Pre-treatment Symptoms/Complaints:  \" Yes I will try the steps and walk. \" 
Pain: Initial: numeric scale Post Session:    
 
 
Gait Training (30 Minutes):  Gait training to improve and/or restore physical functioning as related to mobility, strength, balance, coordination and dynamic movement of leg - bilateral to improve functional gait. Ambulated 1300 Feet (ft) with Stand-by assistance using a   and minimal Safety awareness training related to their stride length and heel strike to promote proper body alignment, promote proper body posture and promote proper body mechanics. Therapeutic Exercise: ( ):  Exercises per grid below to improve mobility, strength, balance, coordination and dynamic movement of leg - bilateral and core to improve functional core stability & endurance. Required minimal verbal cues to promote proper body alignment, promote proper body posture and promote proper body mechanics. Progressed complexity of movement as indicated. Braces/Orthotics/Lines/Etc:  
· IV Treatment/Session Assessment:   
· Response to Treatment:   Pt doing well today · Interdisciplinary Collaboration:  
o Registered Nurse · After treatment position/precautions:  
o Supine in bed 
o Bed/Chair-wheels locked 
o Bed in low position o Call light within reach 
o RN notified``````````````````````````` · Compliance with Program/Exercises: Will assess as treatment progresses  no questions. · Recommendations/Intent for next treatment session: \"Next visit will focus on advancements to more challenging activities and reduction in assistance provided\". Total Treatment Duration: PT Patient Time In/Time Out Time In: 1400 Time Out: 1430 Seth Tena, KRISTOPHER

## 2019-04-17 NOTE — PROGRESS NOTES
Nutrition follow-up: 
Consult received for TPN management per nutrition support protocols/ Dr. Mekhi Cheema 4/09. Reason for initial assessment: Length of stay Assessment:  
PN:  Dex 5% 4.25% amino acids at 110 ml/hr with 250 ml 20% lipids daily DIET FULL  LIQUID effective 4/16 at 1415. Pt consumed primarily whole milk, cream soup and vanilla yogurt since diet advancement. Food/Nutrition Patient History:  Pt presented with 1 week history of abdominal bloating and pain, decreased colostomy output. Past medical history notable for rectal cancer, ileostomy, colostomy in 2014. Central line access : Plan for PICC line placement 4/09. PICC teaeim unable to place PICC. POD 13 surgery/lysis of adhesions, small bowel resection. NGT-LIS: removed 4/14. At visit today, pt stated he tolerated milk, soup and yogurt at supper last night; did not accept breakfast this am r/t lateness of breakfast service; states he will wait until lunch. Pt reports tolerating diet advancement; no nausea or abdominal pain. Abdomen: soft, active bowel sounds. +flatus,  Ostomy with stool; no recorded stool this am.   
Pertinent Labs: Na 132, glucose 105 mg/dl. POC glucose 4/17: 118 mg/dl, K 4.6, Mg 1.5-to receive Mg bolus per ERP, Phos 4.1 -trending down. Anthropometrics:  (3/30) Height: 5' 8\" (172.7 cm),  Weight: 63.5 kg (140 lb), Weight Source: Patient stated, Body mass index is 21.29 kg/m². BMI class of underweight for Older American Male. BMI recommended (23-30) 4/10: Bed weight 62.6 kg 
4/13:  Bed weight 55.8 kg (123#) BMI 18.7 
4/16-17: Weight: 57 kg (125 lb 10.6 oz) bed source Macronutrient needs: re-estimated 4/13: 
EER:  9967-1727 kcal /day (32-28 kcal/kg BW) GKV:   LIAKA protein/day (1.5-2 grams/kg BW) GFR >60 Max CHO:  321 grams/day (4mg/kg BW/min) Fluid:  1ml/kcal 
Intake/Comparative Standards:  No recorded intake; based on patient's verbalized intake since diet advancement to full liquids, pt potentially meets ~25% estimated kcal and protein needs. PPN: 5%DEX/ 4.25%AA; at 110 ml/hr with 250 ml 20% Lipids daily- provides 1397 kcal/d (79% of needs), 112 grams of protein/d (100% of needs), 132 grams of CHO/d (does not exceed maximum CHO load) and 2890 ml of total volume/d ( >100% of needs). Intervention:  
Meals and snacks: Full Liquids; advance per MD recommendation as medical condition dictates. Pt accepting limited selections on full liquids; reqesting milk, cream soup, yogurt, grits and water; no juice, desserts, nutrition supplements. Nutritional Supplement Therapy: Electrolyte replacement per nutritional support protocols are active on MAR. PN: 1. Continue  PPN: 5%DEX/ 4.25%AA at 110 ml/hr with 250 ml 20% Lipids daily. 2. Additives: Sodium Chloride-50 mEq/L; 35 mEq/L Potassium Chloride, Ca gluconate 4.5 mEq/L, Mg Sulfate-8 mEq/L, MVI and MTE. Modifications: increased Sodium to 50 mEq/L, increased Mg to 8 mEq/L and discontinued KPO4. Kendrick Castro 2.  TPN labs, POC Glucoses/SSI if Glucose > 180 mg/dl Coordination of Nutrition Care:  Cherry Werner, Pharmacist 
Discharge nutrition plan: Too soon to determine Nikki Ochoa, MPH, [address] [place] [address],  
[phone]

## 2019-04-17 NOTE — PROGRESS NOTES
Patient with magnesium of 1.5. Will replace mag with Magnesium 2g/50 ml IVPB per electrolyte replacement protocol.

## 2019-04-17 NOTE — PROGRESS NOTES
[address] [facility], [address] [place], [address] PLAN:Continue Full liquid diet ASSESSMENT:  Admit Date: 3/30/2019  
14 Days Post-Op  Procedure(s): EXPLORATORY LAPAROTOMY, LYSIS OF ADHESIONS, SMALL BOWEL RESECTION X1, ENTEROTOMY REPAIR X3, REPAIR OF CYSTOTOMY Principal Problem: 
  Small bowel obstruction ([address] (3/30/2019) [address] well. Reports BM. No bloating or cramping. Wants to continue full liquids. OBJECTIVE: 
Constitutional: Alert oriented cooperative patient in no acute distress; appears stated age Visit Vitals /75 (BP 1 Location: Left arm, BP Patient Position: At rest;Supine) Pulse 90 Temp 98.4 °F (36.9 °C) Resp 18 Ht 5' 8\" (1.727 m) Wt 125 lb 10.6 oz (57 kg) SpO2 100% BMI 19.11 kg/m² Eyes:Sclera are clear. ENMT: no external lesions gross hearing normal; no obvious neck masses, no ear or lip lesions CV: RRR. Normal perfusion Resp: No JVD. Breathing is  non-labored; no audible wheezing. GI: soft. Stool in ostomy. Musculoskeletal: unremarkable with normal function. No embolic signs or cyanosis. Neuro:  Oriented; moves all 4; no focal deficits Psychiatric: normal affect and mood, no memory impairment Patient Vitals for the past 24 hrs: 
 BP Temp Pulse Resp SpO2 Weight 04/17/19 0740 134/75 98.4 °F (36.9 °C) 90 18 100 %   
04/17/19 0641      125 lb 10.6 oz (57 kg) 04/17/19 0342 135/70 98.7 °F (37.1 °C) 95 18 94 %   
04/16/19 2310 136/77 98.9 °F (37.2 °C) 85 16 96 %   
04/16/19 1958 156/77 99.1 °F (37.3 °C) 86 18 92 %   
04/16/19 1640 143/83 99.5 °F (37.5 °C) 90 18 91 %  Labs:   
Recent Labs 04/17/19 
7374 * K 4.6  CO2 23 BUN 26* CREA 0.81 * Mellody Neely Saúl, DO

## 2019-04-17 NOTE — PROGRESS NOTES
Patient continues to refuse SCD hose. Discussed the purpose of SCD hose and the need to wear these while in a hospital setting. Patient continues to refuse.

## 2019-04-18 LAB
ANION GAP SERPL CALC-SCNC: 6 MMOL/L
BUN SERPL-MCNC: 22 MG/DL (ref 8–23)
CALCIUM SERPL-MCNC: 8.8 MG/DL (ref 8.3–10.4)
CHLORIDE SERPL-SCNC: 102 MMOL/L (ref 98–107)
CO2 SERPL-SCNC: 24 MMOL/L (ref 21–32)
CREAT SERPL-MCNC: 0.73 MG/DL (ref 0.8–1.5)
GLUCOSE BLD STRIP.AUTO-MCNC: 112 MG/DL (ref 65–100)
GLUCOSE BLD STRIP.AUTO-MCNC: 121 MG/DL (ref 65–100)
GLUCOSE BLD STRIP.AUTO-MCNC: 123 MG/DL (ref 65–100)
GLUCOSE BLD STRIP.AUTO-MCNC: 97 MG/DL (ref 65–100)
GLUCOSE SERPL-MCNC: 106 MG/DL (ref 65–100)
POTASSIUM SERPL-SCNC: 4.1 MMOL/L (ref 3.5–5.1)
SODIUM SERPL-SCNC: 132 MMOL/L (ref 136–145)

## 2019-04-18 PROCEDURE — 74011000258 HC RX REV CODE- 258: Performed by: SURGERY

## 2019-04-18 PROCEDURE — 80048 BASIC METABOLIC PNL TOTAL CA: CPT

## 2019-04-18 PROCEDURE — 74011250636 HC RX REV CODE- 250/636: Performed by: SURGERY

## 2019-04-18 PROCEDURE — 36415 COLL VENOUS BLD VENIPUNCTURE: CPT

## 2019-04-18 PROCEDURE — 74011000250 HC RX REV CODE- 250: Performed by: SURGERY

## 2019-04-18 PROCEDURE — C9113 INJ PANTOPRAZOLE SODIUM, VIA: HCPCS | Performed by: SURGERY

## 2019-04-18 PROCEDURE — 77030010541

## 2019-04-18 PROCEDURE — 74011250637 HC RX REV CODE- 250/637: Performed by: SURGERY

## 2019-04-18 PROCEDURE — 65270000029 HC RM PRIVATE

## 2019-04-18 PROCEDURE — 82962 GLUCOSE BLOOD TEST: CPT

## 2019-04-18 PROCEDURE — 77030010522

## 2019-04-18 RX ADMIN — VITAMIN D, TAB 1000IU (100/BT) 2000 UNITS: 25 TAB at 09:52

## 2019-04-18 RX ADMIN — I.V. FAT EMULSION 250 ML: 20 EMULSION INTRAVENOUS at 18:16

## 2019-04-18 RX ADMIN — AMITRIPTYLINE HYDROCHLORIDE 100 MG: 50 TABLET, FILM COATED ORAL at 22:12

## 2019-04-18 RX ADMIN — OLANZAPINE 2.5 MG: 2.5 TABLET, FILM COATED ORAL at 22:13

## 2019-04-18 RX ADMIN — Medication 1 AMPULE: at 22:14

## 2019-04-18 RX ADMIN — VITAMIN D, TAB 1000IU (100/BT) 2000 UNITS: 25 TAB at 18:19

## 2019-04-18 RX ADMIN — PANTOPRAZOLE SODIUM 40 MG: 40 INJECTION, POWDER, FOR SOLUTION INTRAVENOUS at 09:05

## 2019-04-18 RX ADMIN — Medication 1 AMPULE: at 09:52

## 2019-04-18 RX ADMIN — CLONAZEPAM 4 MG: 1 TABLET ORAL at 22:13

## 2019-04-18 RX ADMIN — OLANZAPINE 5 MG: 2.5 TABLET, FILM COATED ORAL at 02:20

## 2019-04-18 RX ADMIN — CALCIUM GLUCONATE: 98 INJECTION, SOLUTION INTRAVENOUS at 18:16

## 2019-04-18 RX ADMIN — LEVOTHYROXINE SODIUM 125 MCG: 125 TABLET ORAL at 06:03

## 2019-04-18 NOTE — PROGRESS NOTES
Pt resting in bed and is alert and oriented x 4. he denies pain and is on room air. RR even and unlabored. TPN and Lipids infusing. Ostomy in place. Midline incision c/d/i. Call light in reach and pt instructed to call for assistance if needed. Will monitor.

## 2019-04-18 NOTE — PROGRESS NOTES
Nutrition follow-up: 
Consult received for TPN management per nutrition support protocols/ Dr. Michael Gomez 4/09. Reason for initial assessment: Length of stay Assessment:  
PN:  Dex 5% 4.25% amino acids at 110 ml/hr with 250 ml 20% lipids daily DIET FULL  LIQUID effective 4/16 at 1415. . 
Food/Nutrition Patient History:  Pt presented with 1 week history of abdominal bloating and pain, decreased colostomy output. Past medical history notable for rectal cancer, ileostomy, colostomy in 2014. Central line access : Plan for PICC line placement 4/09. PICC teaeim unable to place PICC. POD 14 surgery/lysis of adhesions, small bowel resection. NGT-LIS: removed 4/14. At visit today, pt stated he is having abdominal cramping and gas, no nausea; thinks he may be intolerant to milk products. Pt states he would like to try very soft foods such as scrambled egg or creamed potatoes. No breakfast today; willing to try strained cream chicken soup and thin cream of wheat for breakfast.   
Abdomen: soft, active bowel sounds. +flatus,  No stool recorded in ostomy bag since 4/16. Pertinent Labs: Na 132-stable, glucose 106 mg/dl. POC glucose 4/18: 112-121 mg/dl, K 4.1 Anthropometrics:  (3/30) Height: 5' 8\" (172.7 cm),  Weight: 63.5 kg (140 lb), Weight Source: Patient stated, Body mass index is 21.29 kg/m². BMI class of underweight for Older American Male. BMI recommended (23-30) 4/10: Bed weight 62.6 kg 
4/13:  Bed weight 55.8 kg (123#) BMI 18.7 
4/16-17: Weight: 57 kg (125 lb 10.6 oz) bed source Macronutrient needs: re-estimated 4/13: 
EER:  7654-0215 kcal /day (32-28 kcal/kg BW) SRB:   XHTMJ protein/day (1.5-2 grams/kg BW) GFR >60 Max CHO:  321 grams/day (4mg/kg BW/min) Fluid:  1ml/kcal 
Intake/Comparative Standards:  No recorded intake; based on patient's verbalized intake since diet advancement to full liquids, pt potentially meets <20% estimated kcal and protein needs. PPN: 5%DEX/ 4.25%AA; at 110 ml/hr with 250 ml 20% Lipids daily- provides 1397 kcal/d (79% of needs), 112 grams of protein/d (100% of needs), 132 grams of CHO/d (does not exceed maximum CHO load) and 2890 ml of total volume/d ( >100% of needs). Intervention:  
Meals and snacks: Full Liquids; advance per MD recommendation as medical condition dictates. Pt accepting limited selections on full liquids; currently strained cream of chicken soup, water and willing to try cream of wheat.; no juice, desserts, milk, yogurt or nutrition supplements. Declines soy milk to try. Nutrition Supplement:  Declines all supplements. Nutritional Supplement Therapy: Electrolyte replacement per nutritional support protocols are active on MAR. PN: 1. Continue  PPN: 5%DEX/ 4.25%AA at 110 ml/hr with 250 ml 20% Lipids daily. 2. Additives: Sodium Chloride-50 mEq/L; 35 mEq/L Potassium Chloride, Ca gluconate 4.5 mEq/L, Mg Sulfate-8 mEq/L, MVI and MTE.    
2.  TPN labs, POC Glucoses/SSI if Glucose > 180 mg/dl Coordination of Nutrition Care:  Patrica Kanner, Pharmacist 
Discharge nutrition plan: Too soon to determine Va Bryson, MPH, [address] [place] [address],  
[phone]

## 2019-04-18 NOTE — PROGRESS NOTES
Care Management Interventions PCP Verified by CM: Yes Transition of Care Consult (CM Consult): Discharge Planning Current Support Network: Own Home, Lives Alone Confirm Follow Up Transport: Family Discharge Location Discharge Placement: Home Chart reviewed. Pt continues on PPN & per dietician's notes is taking very minimal po. Pt plans to return home.

## 2019-04-18 NOTE — PROGRESS NOTES
[address] [facility], [address] [place], [address] PLAN:advance to soft diet Ambulate ASSESSMENT:  Admit Date: 3/30/2019  
15 Days Post-Op  Procedure(s): EXPLORATORY LAPAROTOMY, LYSIS OF ADHESIONS, SMALL BOWEL RESECTION X1, ENTEROTOMY REPAIR X3, REPAIR OF CYSTOTOMY Principal Problem: 
  Small bowel obstruction ([address] (3/30/2019) SUBJECTIVE:Complains of cramping today. No new reported BM. No N/V/F. He agrees to try soft diet. OBJECTIVE: 
Constitutional: Alert oriented cooperative patient in no acute distress; appears stated age Visit Vitals /73 (BP 1 Location: Left arm, BP Patient Position: At rest;Head of bed elevated (Comment degrees)) Pulse 87 Temp 96.3 °F (35.7 °C) Resp 18 Ht 5' 8\" (1.727 m) Wt 125 lb 10.6 oz (57 kg) SpO2 96% BMI 19.11 kg/m² Eyes:Sclera are clear. ENMT: no external lesions gross hearing normal; no obvious neck masses, no ear or lip lesions CV: RRR. Normal perfusion Resp: No JVD. Breathing is  non-labored; no audible wheezing. GI: soft. No ostomy output. Positive BS. Incision intact no drainage. Musculoskeletal: unremarkable with normal function. No embolic signs or cyanosis. Neuro:  Oriented; moves all 4; no focal deficits Psychiatric: normal affect and mood, no memory impairment Patient Vitals for the past 24 hrs: 
 BP Temp Pulse Resp SpO2  
04/18/19 1230 128/73 96.3 °F (35.7 °C) 87 18 96 % 04/18/19 0726 124/70 97.6 °F (36.4 °C) 92 24 94 % 04/18/19 0346 115/68 99 °F (37.2 °C) 86 16 96 % 04/17/19 2309 119/70 98 °F (36.7 °C) 86 16 97 % 04/17/19 1636 139/75 98.8 °F (37.1 °C) 87 18 90 % Labs:   
Recent Labs 04/18/19 
9700 * K 4.1  CO2 24 BUN 22  
CREA 0.73* * Frutoso Gomez Saúl, DO

## 2019-04-18 NOTE — PROGRESS NOTES
Patient full assessment completed. Patient is drowsy and has a flat affect. He complains of no pain at this time. His ostomy is putting out liquid stool and he is refusing his colace. His urine is yellow, clear, and has no foul odor.

## 2019-04-19 LAB
ANION GAP SERPL CALC-SCNC: 9 MMOL/L
BUN SERPL-MCNC: 21 MG/DL (ref 8–23)
CALCIUM SERPL-MCNC: 9 MG/DL (ref 8.3–10.4)
CHLORIDE SERPL-SCNC: 102 MMOL/L (ref 98–107)
CO2 SERPL-SCNC: 24 MMOL/L (ref 21–32)
CREAT SERPL-MCNC: 0.73 MG/DL (ref 0.8–1.5)
GLUCOSE BLD STRIP.AUTO-MCNC: 109 MG/DL (ref 65–100)
GLUCOSE SERPL-MCNC: 112 MG/DL (ref 65–100)
MAGNESIUM SERPL-MCNC: 1.9 MG/DL (ref 1.8–2.4)
PHOSPHATE SERPL-MCNC: 3.4 MG/DL (ref 2.3–3.7)
POTASSIUM SERPL-SCNC: 4.2 MMOL/L (ref 3.5–5.1)
SODIUM SERPL-SCNC: 135 MMOL/L (ref 136–145)

## 2019-04-19 PROCEDURE — 74011250636 HC RX REV CODE- 250/636: Performed by: SURGERY

## 2019-04-19 PROCEDURE — 65270000029 HC RM PRIVATE

## 2019-04-19 PROCEDURE — 74011000250 HC RX REV CODE- 250: Performed by: SURGERY

## 2019-04-19 PROCEDURE — 36415 COLL VENOUS BLD VENIPUNCTURE: CPT

## 2019-04-19 PROCEDURE — 80048 BASIC METABOLIC PNL TOTAL CA: CPT

## 2019-04-19 PROCEDURE — 74011000258 HC RX REV CODE- 258: Performed by: SURGERY

## 2019-04-19 PROCEDURE — 84100 ASSAY OF PHOSPHORUS: CPT

## 2019-04-19 PROCEDURE — 74011250637 HC RX REV CODE- 250/637: Performed by: SURGERY

## 2019-04-19 PROCEDURE — C9113 INJ PANTOPRAZOLE SODIUM, VIA: HCPCS | Performed by: SURGERY

## 2019-04-19 PROCEDURE — 82962 GLUCOSE BLOOD TEST: CPT

## 2019-04-19 PROCEDURE — 83735 ASSAY OF MAGNESIUM: CPT

## 2019-04-19 RX ADMIN — LEVOTHYROXINE SODIUM 125 MCG: 125 TABLET ORAL at 06:01

## 2019-04-19 RX ADMIN — PANTOPRAZOLE SODIUM 40 MG: 40 INJECTION, POWDER, FOR SOLUTION INTRAVENOUS at 11:16

## 2019-04-19 RX ADMIN — LORAZEPAM 1 MG: 2 INJECTION INTRAMUSCULAR; INTRAVENOUS at 03:07

## 2019-04-19 RX ADMIN — I.V. FAT EMULSION 250 ML: 20 EMULSION INTRAVENOUS at 18:24

## 2019-04-19 RX ADMIN — Medication 1 AMPULE: at 11:16

## 2019-04-19 RX ADMIN — OLANZAPINE 2.5 MG: 2.5 TABLET, FILM COATED ORAL at 22:33

## 2019-04-19 RX ADMIN — OLANZAPINE 5 MG: 2.5 TABLET, FILM COATED ORAL at 02:17

## 2019-04-19 RX ADMIN — Medication 1 AMPULE: at 22:34

## 2019-04-19 RX ADMIN — CLONAZEPAM 4 MG: 1 TABLET ORAL at 22:33

## 2019-04-19 RX ADMIN — AMITRIPTYLINE HYDROCHLORIDE 100 MG: 50 TABLET, FILM COATED ORAL at 22:33

## 2019-04-19 RX ADMIN — VITAMIN D, TAB 1000IU (100/BT) 2000 UNITS: 25 TAB at 17:25

## 2019-04-19 RX ADMIN — VITAMIN D, TAB 1000IU (100/BT) 2000 UNITS: 25 TAB at 11:16

## 2019-04-19 RX ADMIN — CALCIUM GLUCONATE: 98 INJECTION, SOLUTION INTRAVENOUS at 18:23

## 2019-04-19 NOTE — PROGRESS NOTES
[address] [facility] [address], [address] [place], [address] PLAN:Back to Full Liquid diet Ambulate Dr. Reynold Chin back on Monday ASSESSMENT:  Admit Date: 3/30/2019  
16 Days Post-Op  Procedure(s): EXPLORATORY LAPAROTOMY, LYSIS OF ADHESIONS, SMALL BOWEL RESECTION X1, ENTEROTOMY REPAIR X3, REPAIR OF CYSTOTOMY Principal Problem: 
  Small bowel obstruction ([address] (3/30/2019) SUBJECTIVE:Patient complains of severe abdominal cramps with scrambled eggs. He put himself back on CLD. Agrees today to Full Liquids. Positive ostomy output. Incision clean and dry. OBJECTIVE: 
Constitutional: Alert oriented cooperative patient in no acute distress; appears stated age Visit Vitals /71 (BP 1 Location: Right arm, BP Patient Position: At rest;Head of bed elevated (Comment degrees)) Pulse 95 Temp 98.7 °F (37.1 °C) Resp 18 Ht 5' 8\" (1.727 m) Wt 133 lb 8 oz (60.6 kg) SpO2 96% BMI 20.30 kg/m² Eyes:Sclera are clear. ENMT: no external lesions gross hearing normal; no obvious neck masses, no ear or lip lesions CV: RRR. Normal perfusion Resp: No JVD. Breathing is  non-labored; no audible wheezing. GI: soft, pos bs. Musculoskeletal: unremarkable with normal function. No embolic signs or cyanosis. Neuro:  Oriented; moves all 4; no focal deficits Psychiatric: normal affect and mood, no memory impairment Patient Vitals for the past 24 hrs: 
 BP Temp Pulse Resp SpO2 Weight 04/19/19 1120 132/71 98.7 °F (37.1 °C) 95 18 96 %   
04/19/19 0848      133 lb 8 oz (60.6 kg) 04/19/19 0726 130/80 98.5 °F (36.9 °C) 84 22 94 %   
04/19/19 0345 126/76 99.3 °F (37.4 °C) 82 16 98 %   
04/18/19 2323 140/80 97.6 °F (36.4 °C) 85 18 98 %   
04/18/19 1940 143/81 98.8 °F (37.1 °C) 88 18 98 %   
04/18/19 1622 135/75 99.1 °F (37.3 °C) 93 18 98 %  Labs:   
Recent Labs 04/19/19 
2949 * K 4.2  CO2 24 BUN 21  
 CREA 0.73* * [place] Border Saúl, DO

## 2019-04-19 NOTE — PROGRESS NOTES
Nutrition follow-up: 
Consult received for TPN management per nutrition support protocols/ Dr. Amy Byrd 4/09. Reason for initial assessment: Length of stay Assessment:  
PN:  Dex 5% 4.25% amino acids at 110 ml/hr with 250 ml 20% lipids daily DIET FULL  LIQUID . Food/Nutrition Patient History:  Pt presented with 1 week history of abdominal bloating and pain, decreased colostomy output. Past medical history notable for rectal cancer, ileostomy, colostomy in 2014. Central line access : Plan for PICC line placement 4/09. PICC team unable to place PICC. POD 15 surgery/lysis of adhesions, small bowel resection. NGT-LIS: removed 4/14. Patient reports intolerance of diet advancement to soft on 4/18; pt tried soft scrambled eggs and states his stomach started cramping again; no nausea. Pt declined breakfast this am; willing to try broth, water and Ensure Enlive today-no other foods per his request.  Pt requested downgrade of diet to clears with Ensure Enlive. Abdomen: soft, tender, active bowel sounds. +flatus,  100 ml brown stool recorded in ostomy bag . Pertinent Labs: Na 135-trending up, glucose 112 mg/dl. K 4.1, Hyperphosphatemia resolved-Phos 3.4 (no added Phos in PPN) Anthropometrics:  (3/30) Height: 5' 8\" (172.7 cm),  Weight: 63.5 kg (140 lb), Weight Source: Patient stated, Body mass index is 21.29 kg/m². BMI class of underweight for Older American Male. BMI recommended (23-30) 4/10: Bed weight 62.6 kg 
4/13:  Bed weight 55.8 kg (123#) BMI 18.7 
4/16-17: Bed Weight:  57 kg (125#10.6 oz) e 
4/19:  Bed weight: 60.6 kg (133# 8 oz) Macronutrient needs: re-estimated 4/13: 
EER:  0066-0982 kcal /day (32-28 kcal/kg BW) CCQ:   MAOMH protein/day (1.5-2 grams/kg BW) GFR >60 Max CHO:  321 grams/day (4mg/kg BW/min) Fluid:  1ml/kcal 
Intake/Comparative Standards:  Declined breakfast this morning; pt with minimal po intake. Pt potentially meets ~10% estimated kcal and protein needs. PPN: 5%DEX/ 4.25%AA; at 110 ml/hr with 250 ml 20% Lipids daily- provides 1397 kcal/d (79% of needs), 112 grams of protein/d (100% of needs), 132 grams of CHO/d (does not exceed maximum CHO load) and 2890 ml of total volume/d ( >100% of needs). Intervention:  
Meals and snacks: Downgraded diet from soft to Full Liquids per patient request.  Pt accepting limited selections on full liquids; broth, water and willing to try Ensure Enlive. no juice, desserts, milk, yogurt, cream soup, hot cereal. 
Nutrition Supplement:  Initiated Ensure Enlive on all meal trays. Nutritional Supplement Therapy: Electrolyte replacement per nutritional support protocols are active on MAR. PN: 1. No change 4/19. Continue  PPN: 5%DEX/ 4.25%AA at 110 ml/hr with 250 ml 20% Lipids daily. 2. Additives: Sodium Chloride-50 Eq/L; 35 mEq/L Potassium Chloride, Ca gluconate 4.5 mEq/L, Mg Sulfate-8 mEq/L, MVI and MTE.    
2.  TPN labs, POC Glucoses/SSI if Glucose > 180 mg/dl Coordination of Nutrition Care:  Santhosh Villegas, Pharmacist 
Discharge nutrition plan: Too soon to determine Johnie Loomis, MPH, [address] [place] [address],  
[phone]

## 2019-04-19 NOTE — PROGRESS NOTES
Shift Assessment - Patient is alert and oriented x4. No complaint of pain at this time. Ostomy bag in tact and draining without difficulty. IV fluids infusing without difficulty. Patient uses urinal.  Resp even and unlabored. Currently resting in a low, locked bed. Call light in reach.

## 2019-04-19 NOTE — PROGRESS NOTES
Pt c/o not able to sleep and requested ambien at this time. This nurse explained to pt that it is too late for [employer] [facility] [employer] administration. Ativan IV  1mg given instead.

## 2019-04-19 NOTE — PROGRESS NOTES
Patient resting in bed. Respirations present, non-labored. PIV infusing w/ TPN/Lipids. Midline incision with staples, open to air. Ostomy with brown stool, stoma pink. Voiding at intervals with urinal. Will continue to monitor.

## 2019-04-20 PROBLEM — S09.90XA TRAUMATIC INJURY OF HEAD: Status: ACTIVE | Noted: 2019-04-20

## 2019-04-20 PROBLEM — S01.81XA FOREHEAD LACERATION, INITIAL ENCOUNTER: Status: ACTIVE | Noted: 2019-04-20
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
## 2019-04-20 LAB
ANION GAP SERPL CALC-SCNC: 7 MMOL/L
BUN SERPL-MCNC: 21 MG/DL (ref 8–23)
CALCIUM SERPL-MCNC: 8.7 MG/DL (ref 8.3–10.4)
CHLORIDE SERPL-SCNC: 101 MMOL/L (ref 98–107)
CO2 SERPL-SCNC: 24 MMOL/L (ref 21–32)
CREAT SERPL-MCNC: 0.61 MG/DL (ref 0.8–1.5)
GLUCOSE SERPL-MCNC: 110 MG/DL (ref 65–100)
POTASSIUM SERPL-SCNC: 4 MMOL/L (ref 3.5–5.1)
SODIUM SERPL-SCNC: 132 MMOL/L (ref 136–145)

## 2019-04-20 PROCEDURE — 74011250636 HC RX REV CODE- 250/636: Performed by: SURGERY

## 2019-04-20 PROCEDURE — 36415 COLL VENOUS BLD VENIPUNCTURE: CPT

## 2019-04-20 PROCEDURE — 74011000250 HC RX REV CODE- 250: Performed by: SURGERY

## 2019-04-20 PROCEDURE — 74011000258 HC RX REV CODE- 258: Performed by: SURGERY

## 2019-04-20 PROCEDURE — 74011250637 HC RX REV CODE- 250/637: Performed by: SURGERY

## 2019-04-20 PROCEDURE — 0HQ0XZZ REPAIR SCALP SKIN, EXTERNAL APPROACH: ICD-10-PCS | Performed by: SURGERY

## 2019-04-20 PROCEDURE — 80048 BASIC METABOLIC PNL TOTAL CA: CPT

## 2019-04-20 PROCEDURE — 65270000029 HC RM PRIVATE

## 2019-04-20 RX ORDER — LIDOCAINE HYDROCHLORIDE 10 MG/ML
5 INJECTION INFILTRATION; PERINEURAL ONCE
Status: COMPLETED | OUTPATIENT
Start: 2019-04-20 | End: 2019-04-20

## 2019-04-20 RX ADMIN — Medication 1 AMPULE: at 09:00

## 2019-04-20 RX ADMIN — I.V. FAT EMULSION 250 ML: 20 EMULSION INTRAVENOUS at 18:46

## 2019-04-20 RX ADMIN — DOCUSATE SODIUM 100 MG: 100 CAPSULE, LIQUID FILLED ORAL at 18:42

## 2019-04-20 RX ADMIN — CALCIUM GLUCONATE: 98 INJECTION, SOLUTION INTRAVENOUS at 18:45

## 2019-04-20 RX ADMIN — AMITRIPTYLINE HYDROCHLORIDE 100 MG: 50 TABLET, FILM COATED ORAL at 21:28

## 2019-04-20 RX ADMIN — LORAZEPAM 1 MG: 2 INJECTION INTRAMUSCULAR; INTRAVENOUS at 02:29

## 2019-04-20 RX ADMIN — OLANZAPINE 2.5 MG: 2.5 TABLET, FILM COATED ORAL at 21:28

## 2019-04-20 RX ADMIN — OLANZAPINE 5 MG: 2.5 TABLET, FILM COATED ORAL at 02:28

## 2019-04-20 RX ADMIN — CLONAZEPAM 4 MG: 1 TABLET ORAL at 21:28

## 2019-04-20 RX ADMIN — VITAMIN D, TAB 1000IU (100/BT) 2000 UNITS: 25 TAB at 18:41

## 2019-04-20 RX ADMIN — LEVOTHYROXINE SODIUM 125 MCG: 125 TABLET ORAL at 05:37

## 2019-04-20 RX ADMIN — LIDOCAINE HYDROCHLORIDE 5 ML: 10 INJECTION, SOLUTION INFILTRATION; PERINEURAL at 11:00

## 2019-04-20 RX ADMIN — Medication 1 AMPULE: at 21:26

## 2019-04-20 NOTE — PROGRESS NOTES
HOB elevated , frail, no acute distress, neurovascular checks WDL, ostomy bag intact , abd soft, stool brown liquid, self care.

## 2019-04-20 NOTE — PROGRESS NOTES
Patient received resting in bed, offers no complaints. Shift assessment completed. Call bell and bed alarm in place for patient safety, will  Monitor.

## 2019-04-20 NOTE — PROGRESS NOTES
Left forehead laceration with staples, no drainage, edematous, 4X4's, applied secured with Kerlix and tape.

## 2019-04-20 NOTE — PROGRESS NOTES
Nutrition follow-up: 
TPN management per nutrition support protocols/ Dr. Dane Graff 4/09. Assessment:  
Diet: TPN ADULT - dextrose 5% amino acid 4.25% DIET FULL LIQUID Food/Nutrition History: The patient is POD 17 of ex lap, lysis of adhesions, small bowel resection, enterotomy repair x3 and repair of cystotomy. The patient is currently on a full liquid diet with minimal acceptance of certain items of full liquid diet. Nursing staff has not recorded po intakes of full liquid diet. Central line access : PICC line was unable to placed. Patient only with peripheral line at this time. Abdominal Status: Ostomy with active bowel sounds; Last BM: 301 cc output over past 24 hours. Pertinent Medications: Colace (pt is refusing) Pertinent Labs:  
Lab Results Component Value Date/Time Sodium 132 (L) 04/20/2019 05:16 AM  
 Potassium 4.0 04/20/2019 05:16 AM  
 Chloride 101 04/20/2019 05:16 AM  
 CO2 24 04/20/2019 05:16 AM  
 Anion gap 7 04/20/2019 05:16 AM  
 Glucose 110 (H) 04/20/2019 05:16 AM  
 BUN 21 04/20/2019 05:16 AM  
 Creatinine 0.61 (L) 04/20/2019 05:16 AM  
 Calcium 8.7 04/20/2019 05:16 AM  
 Albumin 3.8 03/30/2019 12:27 PM  
 Phosphorus 3.4 04/19/2019 06:19 AM  
 
Anthropometrics:Height: 5' 8\" (172.7 cm), Weight Source: Bed, Weight: 60.6 kg (133 lb 8 oz). Last 3 Recorded Weights in this Encounter 04/16/19 0179 04/17/19 5525 04/19/19 6655 Weight: 57 kg (125 lb 10.6 oz) 57 kg (125 lb 10.6 oz) 60.6 kg (133 lb 8 oz) Admission weight: 62.69 kg (Bed weight) Macronutrient needs: Based on weight of 60.6 kg: EER:  8789-7258 kcal /day (25-30 kcal/kg listed BW) DKA:  05-73 RBCJQ protein/day (1.2-1.5 grams/kg BW) GFR >60 Max CHO:  321 grams/day (4mg/kg BW/min) Fluid:  1ml/kcal 
Intake/Comparative Standards:  PO intakes unknown due to no recorded po intakes in flowsheets.  
  
PPN: 5%DEX/ 4.25%AA; at 110 ml/hr with 250 ml 20% Lipids daily- provides 1397 kcal/d (79% of needs), 112 grams of protein/d (100% of needs), 132 grams of CHO/d (does not exceed maximum CHO load) and 2890 ml of total volume/d ( >100% of needs). Intervention:  
Meals and snacks: Full Liquids. Food Preferences: broth, water. Dislikes Juice, desserts, milk, yogurt, cream soup or hot cereal. 
Nutrition Supplement:  Initiated Ensure Enlive on all meal trays. Nutritional Supplement Therapy: Electrolyte replacement per nutritional support protocols are active on MAR. PN: 1. Continue PPN: 5%DEX/ 4.25%AA at 110 ml/hr with 250 ml 20% Lipids daily. NO CHANGES TODAY. Additives to include: Sodium Chloride-50 Eq/L; 35 mEq/L Potassium Chloride, Ca gluconate 4.5 mEq/L, Mg Sulfate-8 mEq/L, MVI and MTE.  
2. Continue TPN labs, POC Glucoses/SSI if Glucose > 180 mg/dl on AM BMP. Discharge nutrition plan: Too soon to determine [address], [address], -[phone]

## 2019-04-20 NOTE — PROGRESS NOTES
Post Fall Documentation Waylon Marroquin witnessed/unwitnessed fall occurred on 4/20/2019 at 0900. The answers to the following questions summarize the fall: In the patient's own words,: 
· What were you attempting to do when you fell? \"Complete dressing\" · Do you know why you fell? \"slipped\" · Do you have any pain/discomfort or any other complaints? \"No\" · Which part of your body made contact with the floor or other object? \"Head\" Nurse: 
? Was this an assisted fall? No 
? Was fall witnessed? No 
? If witnessed, what part of the body made contact with the floor or other object? N/A 
? Patients mental status after the fall/when found: Alert and oriented X 4 
? Any apparent injury: Laceration, left side forehead. ? Immediate interventions for injury/suspected injury? Applied pressure  Dressing, 4X4's/kerlix ? Patient assisted back to bed? No assistance needed/moved self to bed 
? Name of provider notified and time, any comments? Dr. Leonard Bryant ? Name of family member notified and time: No significant other. Immediate VS and physical assessment documented in flow sheets. Neuro assessment every hour x 4 (for potential head injury or unwitnessed fall) documented in flow sheets.  
 
 
Samm Ma RN

## 2019-04-20 NOTE — PROGRESS NOTES
Shift assessment completed via doc flow sheet. NAD at this time. Pt continues on PPN and lipids. Denies c/o pain. Midline incision intact with staples open to air. Bed in low, locked position. Call light within reach. Pt is instructed to call for assistance.

## 2019-04-20 NOTE — PROGRESS NOTES
[address] [facility], [address] [place], [address] PLAN:Back to Full Liquid diet Ambulate Dr. Krystle Johnson back on Monday ASSESSMENT:  Admit Date: 3/30/2019  
17 Days Post-Op  Procedure(s): EXPLORATORY LAPAROTOMY, LYSIS OF ADHESIONS, SMALL BOWEL RESECTION X1, ENTEROTOMY REPAIR X3, REPAIR OF CYSTOTOMY Principal Problem: 
  Small bowel obstruction ([address] (3/30/2019) SUBJECTIVE:Patient complains of severe abdominal cramps with scrambled eggs. He put himself back on CLD. Agrees today to Full Liquids. Positive ostomy output. Incision clean and dry. OBJECTIVE: 
Constitutional: Alert oriented cooperative patient in no acute distress; appears stated age Visit Vitals /80 Pulse 96 Temp 98.9 °F (37.2 °C) Resp 18 Ht 5' 8\" (1.727 m) Wt 133 lb 8 oz (60.6 kg) SpO2 96% BMI 20.30 kg/m² Eyes:Sclera are clear. ENMT: no external lesions gross hearing normal; no obvious neck masses, no ear or lip lesions CV: RRR. Normal perfusion Resp: No JVD. Breathing is  non-labored; no audible wheezing. GI: soft, Incision OK. Musculoskeletal: unremarkable with normal function. No embolic signs or cyanosis. Neuro:  Oriented; moves all 4; no focal deficits Psychiatric: normal affect and mood, no memory impairment Patient Vitals for the past 24 hrs: 
 BP Temp Pulse Resp SpO2 Weight 04/20/19 0407 132/80 98.9 °F (37.2 °C) 96 18 96 %   
04/19/19 2326 121/65 98.8 °F (37.1 °C) 94 20 97 %   
04/19/19 1921 128/82 99.6 °F (37.6 °C) 97 18 94 %   
04/19/19 1520 120/71 99.2 °F (37.3 °C) 96 18 98 %   
04/19/19 1120 132/71 98.7 °F (37.1 °C) 95 18 96 %   
04/19/19 0848      133 lb 8 oz (60.6 kg) 04/19/19 0726 130/80 98.5 °F (36.9 °C) 84 22 94 %  Labs:   
Recent Labs 04/19/19 
9176 * K 4.2  CO2 24 BUN 21  
CREA 0.73* * Marilou Aviles MD

## 2019-04-20 NOTE — PROCEDURES
PROCEDURE    Pt fell in shower today and sustained a laceration to forehead  No LOC  He is HD stable and neurologically intact    After consent, I irrigated wound with betadiene, anesthetized with 1% lidocaine and closed a large 6+cm left forehead laceration with skin stapler. Dressed with sterile 4x4s and kerlix wrap.     Leave staples at least 10 days  Watch for infection

## 2019-04-21 LAB
ANION GAP SERPL CALC-SCNC: 7 MMOL/L
BUN SERPL-MCNC: 31 MG/DL (ref 8–23)
CALCIUM SERPL-MCNC: 8.6 MG/DL (ref 8.3–10.4)
CHLORIDE SERPL-SCNC: 102 MMOL/L (ref 98–107)
CO2 SERPL-SCNC: 24 MMOL/L (ref 21–32)
CREAT SERPL-MCNC: 0.92 MG/DL (ref 0.8–1.5)
GLUCOSE SERPL-MCNC: 122 MG/DL (ref 65–100)
POTASSIUM SERPL-SCNC: 4.3 MMOL/L (ref 3.5–5.1)
SODIUM SERPL-SCNC: 133 MMOL/L (ref 136–145)

## 2019-04-21 PROCEDURE — C9113 INJ PANTOPRAZOLE SODIUM, VIA: HCPCS | Performed by: SURGERY

## 2019-04-21 PROCEDURE — 77030034849

## 2019-04-21 PROCEDURE — 74011000250 HC RX REV CODE- 250: Performed by: SURGERY

## 2019-04-21 PROCEDURE — 36415 COLL VENOUS BLD VENIPUNCTURE: CPT

## 2019-04-21 PROCEDURE — 74011250637 HC RX REV CODE- 250/637: Performed by: SURGERY

## 2019-04-21 PROCEDURE — 80048 BASIC METABOLIC PNL TOTAL CA: CPT

## 2019-04-21 PROCEDURE — 74011250636 HC RX REV CODE- 250/636: Performed by: SURGERY

## 2019-04-21 PROCEDURE — 65270000029 HC RM PRIVATE

## 2019-04-21 PROCEDURE — 74011000258 HC RX REV CODE- 258: Performed by: SURGERY

## 2019-04-21 RX ADMIN — HYDROMORPHONE HYDROCHLORIDE 0.5 MG: 2 INJECTION INTRAMUSCULAR; INTRAVENOUS; SUBCUTANEOUS at 19:27

## 2019-04-21 RX ADMIN — POTASSIUM CHLORIDE: 2 INJECTION, SOLUTION, CONCENTRATE INTRAVENOUS at 17:25

## 2019-04-21 RX ADMIN — HYDROMORPHONE HYDROCHLORIDE 0.5 MG: 2 INJECTION INTRAMUSCULAR; INTRAVENOUS; SUBCUTANEOUS at 04:04

## 2019-04-21 RX ADMIN — OLANZAPINE 2.5 MG: 2.5 TABLET, FILM COATED ORAL at 21:23

## 2019-04-21 RX ADMIN — Medication 1 AMPULE: at 09:08

## 2019-04-21 RX ADMIN — ZOLPIDEM TARTRATE 5 MG: 5 TABLET ORAL at 02:03

## 2019-04-21 RX ADMIN — LEVOTHYROXINE SODIUM 125 MCG: 125 TABLET ORAL at 05:15

## 2019-04-21 RX ADMIN — HYDROMORPHONE HYDROCHLORIDE 0.5 MG: 2 INJECTION INTRAMUSCULAR; INTRAVENOUS; SUBCUTANEOUS at 14:24

## 2019-04-21 RX ADMIN — LORAZEPAM 1 MG: 2 INJECTION INTRAMUSCULAR; INTRAVENOUS at 17:22

## 2019-04-21 RX ADMIN — Medication 1 AMPULE: at 21:23

## 2019-04-21 RX ADMIN — VITAMIN D, TAB 1000IU (100/BT) 2000 UNITS: 25 TAB at 09:08

## 2019-04-21 RX ADMIN — HYDROMORPHONE HYDROCHLORIDE 1 MG: 2 INJECTION INTRAMUSCULAR; INTRAVENOUS; SUBCUTANEOUS at 09:02

## 2019-04-21 RX ADMIN — I.V. FAT EMULSION 250 ML: 20 EMULSION INTRAVENOUS at 17:23

## 2019-04-21 RX ADMIN — OLANZAPINE 5 MG: 2.5 TABLET, FILM COATED ORAL at 01:58

## 2019-04-21 RX ADMIN — LORAZEPAM 1 MG: 2 INJECTION INTRAMUSCULAR; INTRAVENOUS at 10:36

## 2019-04-21 RX ADMIN — PANTOPRAZOLE SODIUM 40 MG: 40 INJECTION, POWDER, FOR SOLUTION INTRAVENOUS at 09:05

## 2019-04-21 RX ADMIN — AMITRIPTYLINE HYDROCHLORIDE 100 MG: 50 TABLET, FILM COATED ORAL at 21:23

## 2019-04-21 RX ADMIN — CLONAZEPAM 4 MG: 1 TABLET ORAL at 21:23

## 2019-04-21 NOTE — PROGRESS NOTES
Nutrition follow-up: 
TPN management per nutrition support protocols/ Dr. Dane Graff 4/09. Assessment:  
Diet: TPN ADULT - dextrose 5% amino acid 4.25% DIET FULL LIQUID 
DIET NUTRITIONAL SUPPLEMENTS All Meals; Ensure [employer] Food/Nutrition History: The patient is POD 18 of ex lap, lysis of adhesions, small bowel resection, enterotomy repair x3 and repair of cystotomy. The patient remains on a full liquid diet with minimal acceptance of certain items. Nursing staff continues with poor documentation of recorded po intakes of full liquid diet. Central line access : PICC line was unable to placed. Patient only with peripheral line at this time, hence reasoning for PPN over TPN. Abdominal Status: abdomen soft with active bowel sounds. Ostomy output: 750 cc output over past 24 hours. Pertinent Medications: Colace x1 dose yesterday, synthroid, Protonix Pertinent Labs:  
Lab Results Component Value Date/Time Sodium 133 (L) 04/21/2019 05:12 AM  
 Potassium 4.3 04/21/2019 05:12 AM  
 Chloride 102 04/21/2019 05:12 AM  
 CO2 24 04/21/2019 05:12 AM  
 Anion gap 7 04/21/2019 05:12 AM  
 Glucose 122 (H) 04/21/2019 05:12 AM  
 BUN 31 (H) 04/21/2019 05:12 AM  
 Creatinine 0.92 04/21/2019 05:12 AM  
 Calcium 8.6 04/21/2019 05:12 AM  
 Albumin 3.8 03/30/2019 12:27 PM  
 Phosphorus 3.4 04/19/2019 06:19 AM  
 
Anthropometrics:Height: 5' 8\" (172.7 cm), Weight Source: Bed, Weight: 60.2 kg (132 lb 12.8 oz). Last 3 Recorded Weights in this Encounter 04/17/19 2829 04/19/19 0848 04/20/19 1421 Weight: 57 kg (125 lb 10.6 oz) 60.6 kg (133 lb 8 oz) 60.2 kg (132 lb 12.8 oz) Admission weight: 62.69 kg (Bed weight) Macronutrient needs: Based on weight of 60.6 kg: EER:  3869-5193 kcal /day (25-30 kcal/kg listed BW) ANDREE:  99-79 JNMNH protein/day (1.2-1.5 grams/kg BW) GFR >60 Max CHO:  321 grams/day (4mg/kg BW/min) Fluid:  1ml/kcal 
Intake/Comparative Standards:  PO intakes unknown due to no recorded po intakes in flowsheets. PPN: 5%DEX/ 4.25%AA; at 110 ml/hr with 250 ml 20% Lipids daily- provides 1397 kcal/d (79% of needs), 112 grams of protein/d (100% of needs), 132 grams of CHO/d (does not exceed maximum CHO load) and 2890 ml of total volume/d ( >100% of needs). Intervention:  
Meals and snacks: Full Liquids. Food Preferences: broth, water. Dislikes Juice, desserts, milk, yogurt, cream soup or hot cereal. 
Nutrition Supplement:  Initiated Ensure Enlive on all meal trays. Nutritional Supplement Therapy: Electrolyte replacement per nutritional support protocols are active on MAR. PN: 1. Continue PPN: 5%DEX/ 4.25%AA at 110 ml/hr with 250 ml 20% Lipids daily. Additive Changes: Increase NaCl to 60 mEq/L, Decrease KCl to 30 mEq/L and take out calcium in PPN tonight. Osmolarity: 890. 
2. Continue TPN labs, POC Glucoses/SSI if Glucose > 180 mg/dl on AM BMP. Discharge nutrition plan: Too soon to determine Tara Fester Margaretta Spatz, [address], [address], [phone]

## 2019-04-21 NOTE — PROGRESS NOTES
Patient requesting sleeping medication. Medicated for sleep with ambien 5mg po per MD order. Will monitor.

## 2019-04-21 NOTE — PROGRESS NOTES
Patient received resting in bed. Shift assessment completed. Medicated for c/o left arm,back and abdominal cramps (7/10) with diaudid 0.5 mg iv per MD order. Will monitor.

## 2019-04-21 NOTE — PROGRESS NOTES
Spoke with David Wallis with general surgery to confirm Dr. Iker Goss was aware of patient's stoma. She stated she had let him know and he was aware.

## 2019-04-21 NOTE — PROGRESS NOTES
Spoke with Norbert Arambula with General Surgery about patient's stoma being pale, dry and retracted. Norbert Arambula stated she would be in touch with Dr. Servando Pretty.

## 2019-04-21 NOTE — PROGRESS NOTES
Assessment completed. Vitals stable. Patient currently resting in bed. Will continue to monitor with hourly rounding.

## 2019-04-21 NOTE — PROGRESS NOTES
Patient c/o being unable to void on own. Patient assisted to side of bed, attempted to use urinal- unsuccessful. Bladder scanned, 900cc noted on scan. MD(Dr. Ry Eugene) made aware. Brito catheter placed as per MD order. 800cc urine output noted in brito bag. Will monitor.

## 2019-04-21 NOTE — PROGRESS NOTES
[address] [facility], [address] [place], [address] PLAN: 
Full Liquid diet Clare Ta in shower on 4/20/19-->forehead lac-->stapled-->Clips out in approx 10-12 days in follow-up with Dr Devon Bishop back in for 900cc residual 
 
Dr. Devon Carmona back on Monday I asked pt about SNF which I think he needs (says he lives alone) but he is not interested in considering ASSESSMENT:  Admit Date: 3/30/2019  
18 Days Post-Op  Procedure(s): EXPLORATORY LAPAROTOMY, LYSIS OF ADHESIONS, SMALL BOWEL RESECTION X1, ENTEROTOMY REPAIR X3, REPAIR OF CYSTOTOMY Principal Problem: 
  Small bowel obstruction ([address] (3/30/2019) Active Problems: 
  Fall in shower (4/20/2019) Traumatic injury of head (4/20/2019) Forehead laceration, initial encounter (4/20/2019) SUBJECTIVE:  
Feels better Had urinary retention this am (900cc residual reported by nursing) OBJECTIVE: 
Constitutional: Alert oriented cooperative patient in no acute distress; appears stated age Visit Vitals /67 (BP 1 Location: Right arm, BP Patient Position: At rest) Pulse 97 Temp 96.9 °F (36.1 °C) Resp 18 Ht 5' 8\" (1.727 m) Wt 132 lb 12.8 oz (60.2 kg) SpO2 95% BMI 20.19 kg/m² Eyes:Sclera are clear. ENMT: no external lesions gross hearing normal; no obvious neck masses, no ear or lip lesions CV: RRR. Normal perfusion Resp: No JVD. Breathing is  non-labored; no audible wheezing. GI: soft, Incision OK. Musculoskeletal: unremarkable with normal function. No embolic signs or cyanosis. Neuro:  Oriented; moves all 4; no focal deficits Psychiatric: normal affect and mood, no memory impairment Patient Vitals for the past 24 hrs: 
 BP Temp Pulse Resp SpO2 Weight 04/21/19 0330 129/67 96.9 °F (36.1 °C) 97 18 95 %   
04/20/19 2331 132/66 100 °F (37.8 °C) 98 18 95 %   
04/20/19 1954 109/66 (!) 100.9 °F (38.3 °C) (!) 101 18 96 %   
 04/20/19 1542 142/70 (!) 100.8 °F (38.2 °C) (!) 104 18 96 %   
04/20/19 1421      132 lb 12.8 oz (60.2 kg) 04/20/19 1150 124/71 99.1 °F (37.3 °C) 100 18 98 %   
04/20/19 0908 136/81  97 18    
04/20/19 0754 122/63 98.8 °F (37.1 °C) 84 18 96 %  Labs:   
Recent Labs  
  04/21/19 
6988 * K 4.3  CO2 24 BUN 31* CREA 0.92  
* Hemalatha Boothe MD

## 2019-04-22 LAB
ANION GAP SERPL CALC-SCNC: 7 MMOL/L
BUN SERPL-MCNC: 26 MG/DL (ref 8–23)
CALCIUM SERPL-MCNC: 7.9 MG/DL (ref 8.3–10.4)
CHLORIDE SERPL-SCNC: 100 MMOL/L (ref 98–107)
CO2 SERPL-SCNC: 24 MMOL/L (ref 21–32)
CREAT SERPL-MCNC: 0.72 MG/DL (ref 0.8–1.5)
GLUCOSE SERPL-MCNC: 109 MG/DL (ref 65–100)
MAGNESIUM SERPL-MCNC: 1.6 MG/DL (ref 1.8–2.4)
PHOSPHATE SERPL-MCNC: 3.1 MG/DL (ref 2.3–3.7)
POTASSIUM SERPL-SCNC: 3.9 MMOL/L (ref 3.5–5.1)
SODIUM SERPL-SCNC: 131 MMOL/L (ref 136–145)
TRIGL SERPL-MCNC: 58 MG/DL (ref 35–150)

## 2019-04-22 PROCEDURE — 74011000258 HC RX REV CODE- 258: Performed by: SURGERY

## 2019-04-22 PROCEDURE — 84478 ASSAY OF TRIGLYCERIDES: CPT

## 2019-04-22 PROCEDURE — C9113 INJ PANTOPRAZOLE SODIUM, VIA: HCPCS | Performed by: SURGERY

## 2019-04-22 PROCEDURE — 74011250636 HC RX REV CODE- 250/636: Performed by: SURGERY

## 2019-04-22 PROCEDURE — 80048 BASIC METABOLIC PNL TOTAL CA: CPT

## 2019-04-22 PROCEDURE — 74011250637 HC RX REV CODE- 250/637: Performed by: NURSE PRACTITIONER

## 2019-04-22 PROCEDURE — 74011250637 HC RX REV CODE- 250/637: Performed by: SURGERY

## 2019-04-22 PROCEDURE — 74011000250 HC RX REV CODE- 250: Performed by: SURGERY

## 2019-04-22 PROCEDURE — 36415 COLL VENOUS BLD VENIPUNCTURE: CPT

## 2019-04-22 PROCEDURE — 83735 ASSAY OF MAGNESIUM: CPT

## 2019-04-22 PROCEDURE — 84100 ASSAY OF PHOSPHORUS: CPT

## 2019-04-22 PROCEDURE — 65270000029 HC RM PRIVATE

## 2019-04-22 RX ORDER — MAGNESIUM SULFATE HEPTAHYDRATE 40 MG/ML
2 INJECTION, SOLUTION INTRAVENOUS ONCE
Status: COMPLETED | OUTPATIENT
Start: 2019-04-22 | End: 2019-04-22

## 2019-04-22 RX ORDER — OXYCODONE AND ACETAMINOPHEN 5; 325 MG/1; MG/1
1 TABLET ORAL
Status: DISCONTINUED | OUTPATIENT
Start: 2019-04-22 | End: 2019-05-03 | Stop reason: HOSPADM

## 2019-04-22 RX ORDER — SIMETHICONE 80 MG
80 TABLET,CHEWABLE ORAL
Status: DISCONTINUED | OUTPATIENT
Start: 2019-04-22 | End: 2019-05-03 | Stop reason: HOSPADM

## 2019-04-22 RX ADMIN — AMITRIPTYLINE HYDROCHLORIDE 100 MG: 50 TABLET, FILM COATED ORAL at 20:43

## 2019-04-22 RX ADMIN — PANTOPRAZOLE SODIUM 40 MG: 40 INJECTION, POWDER, FOR SOLUTION INTRAVENOUS at 09:53

## 2019-04-22 RX ADMIN — LEVOTHYROXINE SODIUM 125 MCG: 125 TABLET ORAL at 05:45

## 2019-04-22 RX ADMIN — VITAMIN D, TAB 1000IU (100/BT) 2000 UNITS: 25 TAB at 17:48

## 2019-04-22 RX ADMIN — OLANZAPINE 2.5 MG: 2.5 TABLET, FILM COATED ORAL at 20:42

## 2019-04-22 RX ADMIN — POTASSIUM CHLORIDE: 2 INJECTION, SOLUTION, CONCENTRATE INTRAVENOUS at 17:38

## 2019-04-22 RX ADMIN — CLONAZEPAM 4 MG: 1 TABLET ORAL at 20:42

## 2019-04-22 RX ADMIN — MAGNESIUM SULFATE HEPTAHYDRATE 2 G: 40 INJECTION, SOLUTION INTRAVENOUS at 11:20

## 2019-04-22 RX ADMIN — Medication 1 AMPULE: at 09:53

## 2019-04-22 RX ADMIN — VITAMIN D, TAB 1000IU (100/BT) 2000 UNITS: 25 TAB at 09:53

## 2019-04-22 RX ADMIN — Medication 1 AMPULE: at 20:43

## 2019-04-22 RX ADMIN — OXYCODONE HYDROCHLORIDE AND ACETAMINOPHEN 1 TABLET: 5; 325 TABLET ORAL at 14:06

## 2019-04-22 RX ADMIN — I.V. FAT EMULSION 250 ML: 20 EMULSION INTRAVENOUS at 17:43

## 2019-04-22 RX ADMIN — HYDROMORPHONE HYDROCHLORIDE 2 MG: 2 INJECTION INTRAMUSCULAR; INTRAVENOUS; SUBCUTANEOUS at 20:08

## 2019-04-22 NOTE — PROGRESS NOTES
Nutrition follow-up: 
TPN management per nutrition support protocols/ Dr. Piotr Azul 4/09. Assessment: DIET FULL LIQUID 
DIET NUTRITIONAL SUPPLEMENTS All Meals; Ensure [employer] TPN ADULT - dextrose 5% amino acid 4.25% (PPN formulation secondary access) Food/Nutrition History: The patient is POD 17 of ex lap, lysis of adhesions, small bowel resection, enterotomy repair x3 and repair of cystotomy. The patient remains on a full liquid diet with minimal acceptance of items. No recorded po intake. Pt tells me he has been drinking 3 ensure enlive daily (ordered 4/20) and broth at meals. Noted to have one unopened ensure enlive at bedside at RD visit. Pt c/o poor tolerance of eggs last week and prolonged hospitalization. Pt reports he is unable to tolerate anything from the liquid options other than broth, water and ensure enlive. He reports drinking only minimal amounts of water and has multiple unopened bottled lindsey at bedside. Central line access : PICC line was unable to placed. Patient only with peripheral line at this time. Abdominal Status: + active bowel sounds. Ostomy output: none recorded yesterday, RN reported verbally to RD as ~50ml this am with change, an additional 475ml output reported following my visit. Minimal changes can be made to PPN solution and maintain osmolarity appropriate for peripheral line infusion. Pt is receiving a high volume of PPN in order to provide maximum calories which remain less than estimated needs. Volume is potentially contributing to sodium level. Pt will likely continue to require electrolyte replacements in order to provide maximum Na and not exceed osmolarity cutoff of PPN solution. Pertinent Medications: colace pt refuses secondary to watery ostomy output, synthroid, protonix Pertinent Labs:  
Na 131(down from 133 despite increase in PPN), K 3.9 (down from 4.3 with reduction in PPN to allow for increase in NaCl), Glu 109, Ca 7.9 (calcium omitted from PPN 4/21 to allow for sodium increase), Mg 1.6 (down from 1.9 replacement ordered for today). Anthropometrics:Height: 5' 8\" (172.7 cm),  Weight: 59.4 kg (130 lb 15.3 oz), Weight Source: Bed, Body mass index is 19.91 kg/m². BMI class of underweight for age >71. Last 3 Recorded Weights in this Encounter 04/19/19 0848 04/20/19 1421 04/21/19 1423 Weight: 60.6 kg (133 lb 8 oz) 60.2 kg (132 lb 12.8 oz) 59.4 kg (130 lb 15.3 oz) Admission weight: 62.69 kg (Bed weight) Macronutrient needs: Based on weight of 60.6 kg: EER:  1711-9217 kcal /day (25-30 kcal/kg listed BW) JRL:  41-78 MVMXD protein/day (1.2-1.5 grams/kg BW) GFR >60 Max CHO:  321 grams/day (4mg/kg BW/min) Fluid:  1ml/kcal 
Intake/Comparative Standards:   
Unable to determine po contributions to needs secondary to no recorded po intake.   
PPN: 5%DEX/ 4.25%AA; at 110 ml/hr with 250 ml 20% Lipids daily- provides 1397 kcal/d (79% of needs), 112 grams of protein/d (100% of needs), 132 grams of CHO/d (does not exceed maximum CHO load) and 2890 ml of total volume/d ( >100% of needs).   
Intervention:  
Meals and snacks: Full Liquids. Food Preferences: broth, water. Dislikes Juice, desserts, milk, yogurt, cream soup or hot cereal. 
Nutrition Supplement:  Continue Ensure Enlive on all meal trays (chocolate). Nutritional Supplement Therapy: Electrolyte replacement per nutritional support protocols are active on MAR. PN: 1. Continue PPN: 5%DEX/ 4.25%AA at 110 ml/hr with 250 ml 20% Lipids daily. No changes today to maintain max NaCl with Osmolarity: 890. Pt likely will require additional electrolyte replacement tomorrow. 2.  If it is anticipated that patient will continue to require parenteral nutrition for primary needs may need to consider alternate central line access in order to more closely meet estimated needs and increase the amount of electrolytes that can be provided in the formulation with a TPN solution. 3. Continue PN labs, POC Glucoses/SSI if Glucose > 180 mg/dl on AM BMP. Add Magnesium tomorrow. Coordination of Care: Pippa Vazquez RN, Jarrett Corona, Case Management, [address] [facility], [address] [place] [address], LD 
[phone]

## 2019-04-22 NOTE — PROGRESS NOTES
Reassessed stoma and was able to visualized better. Stoma pink and moist. Does not stephania to touch. Possible retraction still noted.

## 2019-04-22 NOTE — PROGRESS NOTES
Patient found attempting to get out of bed on his own upon making rounds. Assisted back to bed, repositioned for comfort. Patient oriented to self only at this time. Patient reoriented to unit/surroundings and time. Call bell and bed alarm remain in place for patient safety. Will monitor.

## 2019-04-22 NOTE — PROGRESS NOTES
Assessment completed and documented. Lung sounds clear. Heart sounds regular. Ostomy had a large amount of watery brown output. Stoma looks like it has possibly retracted-MD made ware of this yesterday. Bowel sounds active. Bishop in place and draining clear, yellow urine. Generalized weakness. No edema noted. Patient currently resting in bed. Will continue to monitor with hourly rounding.

## 2019-04-22 NOTE — PROGRESS NOTES
Patient asleep in bed this AM, slept well overnight. No s/s distress/discomfort noted. Call bell and bed alarm remain in place for patient safety, will monitor.

## 2019-04-22 NOTE — PROGRESS NOTES
LEIGH spoke with Aurelio Glover admission liaison at [facility] ( [facility] ) who states they can accept a pt with TPN. LEIGH provided this info to dietician, who states pt is not receiving adequate nutrition with PPN. LEIGH hopeful to see MD when he rounds to address d/c plan.

## 2019-04-23 LAB
ANION GAP SERPL CALC-SCNC: 9 MMOL/L
BUN SERPL-MCNC: 22 MG/DL (ref 8–23)
CALCIUM SERPL-MCNC: 8.2 MG/DL (ref 8.3–10.4)
CHLORIDE SERPL-SCNC: 101 MMOL/L (ref 98–107)
CO2 SERPL-SCNC: 24 MMOL/L (ref 21–32)
CREAT SERPL-MCNC: 0.59 MG/DL (ref 0.8–1.5)
GLUCOSE SERPL-MCNC: 105 MG/DL (ref 65–100)
MAGNESIUM SERPL-MCNC: 1.9 MG/DL (ref 1.8–2.4)
POTASSIUM SERPL-SCNC: 4.1 MMOL/L (ref 3.5–5.1)
PSA SERPL-MCNC: 0.7 NG/ML
SODIUM SERPL-SCNC: 134 MMOL/L (ref 136–145)

## 2019-04-23 PROCEDURE — 74011250636 HC RX REV CODE- 250/636: Performed by: SURGERY

## 2019-04-23 PROCEDURE — 74011250637 HC RX REV CODE- 250/637: Performed by: SURGERY

## 2019-04-23 PROCEDURE — 36415 COLL VENOUS BLD VENIPUNCTURE: CPT

## 2019-04-23 PROCEDURE — 65270000029 HC RM PRIVATE

## 2019-04-23 PROCEDURE — 83735 ASSAY OF MAGNESIUM: CPT

## 2019-04-23 PROCEDURE — 86580 TB INTRADERMAL TEST: CPT | Performed by: SURGERY

## 2019-04-23 PROCEDURE — 74011000250 HC RX REV CODE- 250: Performed by: SURGERY

## 2019-04-23 PROCEDURE — 74011250637 HC RX REV CODE- 250/637: Performed by: NURSE PRACTITIONER

## 2019-04-23 PROCEDURE — 84153 ASSAY OF PSA TOTAL: CPT

## 2019-04-23 PROCEDURE — C9113 INJ PANTOPRAZOLE SODIUM, VIA: HCPCS | Performed by: SURGERY

## 2019-04-23 PROCEDURE — 74011000258 HC RX REV CODE- 258: Performed by: SURGERY

## 2019-04-23 PROCEDURE — 74011000302 HC RX REV CODE- 302: Performed by: SURGERY

## 2019-04-23 PROCEDURE — 80048 BASIC METABOLIC PNL TOTAL CA: CPT

## 2019-04-23 RX ORDER — HYOSCYAMINE SULFATE 0.12 MG/1
0.12 TABLET SUBLINGUAL EVERY 6 HOURS
Status: DISCONTINUED | OUTPATIENT
Start: 2019-04-23 | End: 2019-05-03 | Stop reason: HOSPADM

## 2019-04-23 RX ORDER — TAMSULOSIN HYDROCHLORIDE 0.4 MG/1
0.4 CAPSULE ORAL DAILY
Status: DISCONTINUED | OUTPATIENT
Start: 2019-04-23 | End: 2019-05-03 | Stop reason: HOSPADM

## 2019-04-23 RX ADMIN — HYDROMORPHONE HYDROCHLORIDE 2 MG: 2 INJECTION INTRAMUSCULAR; INTRAVENOUS; SUBCUTANEOUS at 19:53

## 2019-04-23 RX ADMIN — LEVOTHYROXINE SODIUM 125 MCG: 125 TABLET ORAL at 05:43

## 2019-04-23 RX ADMIN — I.V. FAT EMULSION 250 ML: 20 EMULSION INTRAVENOUS at 17:10

## 2019-04-23 RX ADMIN — HYOSCYAMINE SULFATE 0.12 MG: 0.12 TABLET ORAL at 17:13

## 2019-04-23 RX ADMIN — ZOLPIDEM TARTRATE 5 MG: 5 TABLET ORAL at 01:49

## 2019-04-23 RX ADMIN — Medication 1 AMPULE: at 21:00

## 2019-04-23 RX ADMIN — LORAZEPAM 1 MG: 2 INJECTION INTRAMUSCULAR; INTRAVENOUS at 00:55

## 2019-04-23 RX ADMIN — OLANZAPINE 2.5 MG: 2.5 TABLET, FILM COATED ORAL at 21:39

## 2019-04-23 RX ADMIN — VITAMIN D, TAB 1000IU (100/BT) 2000 UNITS: 25 TAB at 17:13

## 2019-04-23 RX ADMIN — HYOSCYAMINE SULFATE 0.12 MG: 0.12 TABLET ORAL at 23:09

## 2019-04-23 RX ADMIN — ZOLPIDEM TARTRATE 5 MG: 5 TABLET ORAL at 23:09

## 2019-04-23 RX ADMIN — SIMETHICONE CHEW TAB 80 MG 80 MG: 80 TABLET ORAL at 19:53

## 2019-04-23 RX ADMIN — LORAZEPAM 1 MG: 2 INJECTION INTRAMUSCULAR; INTRAVENOUS at 23:09

## 2019-04-23 RX ADMIN — VITAMIN D, TAB 1000IU (100/BT) 2000 UNITS: 25 TAB at 10:13

## 2019-04-23 RX ADMIN — POTASSIUM CHLORIDE: 2 INJECTION, SOLUTION, CONCENTRATE INTRAVENOUS at 17:06

## 2019-04-23 RX ADMIN — CLONAZEPAM 4 MG: 1 TABLET ORAL at 21:39

## 2019-04-23 RX ADMIN — OLANZAPINE 5 MG: 2.5 TABLET, FILM COATED ORAL at 00:55

## 2019-04-23 RX ADMIN — Medication 1 AMPULE: at 10:13

## 2019-04-23 RX ADMIN — TUBERCULIN PURIFIED PROTEIN DERIVATIVE 5 UNITS: 5 INJECTION, SOLUTION INTRADERMAL at 16:11

## 2019-04-23 RX ADMIN — PANTOPRAZOLE SODIUM 40 MG: 40 INJECTION, POWDER, FOR SOLUTION INTRAVENOUS at 10:13

## 2019-04-23 RX ADMIN — AMITRIPTYLINE HYDROCHLORIDE 100 MG: 50 TABLET, FILM COATED ORAL at 21:39

## 2019-04-23 RX ADMIN — TAMSULOSIN HYDROCHLORIDE 0.4 MG: 0.4 CAPSULE ORAL at 16:11

## 2019-04-23 RX ADMIN — ACETAMINOPHEN 650 MG: 325 TABLET, FILM COATED ORAL at 23:55

## 2019-04-23 NOTE — PROGRESS NOTES
Assessment completed and documented. Lung sounds clear. Heart sounds regular. Stoma pink. Bowel sounds hypoactive. Watery brown output from ostomy-bag emptied. Bishop draining clear, yellow urine. Incision to abdomen clean, dry and intact. Patient currently resting in bed. Will continue to monitor with hourly rounding.

## 2019-04-23 NOTE — PROGRESS NOTES
Nutrition follow-up: 
TPN management per nutrition support protocols/ Dr. Rigoberto Brody 4/09. Assessment:  
TPN ADULT - dextrose 5% amino acid 4.25% DIET FULL LIQUID 
DIET NUTRITIONAL SUPPLEMENTS All Meals; Ensure Enlive (PPN formulation secondary access) Food/Nutrition History: The patient is POD 18 of ex lap, lysis of adhesions, small bowel resection, enterotomy repair x3 and repair of cystotomy. The patient remains on a full liquid diet with minimal acceptance of full liquid items. No recorded po intake. Pt tells me he has been drinking 3 ensure enlive daily (ordered 4/20); did not accept broth at supper on 4/22; states he has been burping up the broth and wants it discontinued. Pt's current po intake consist of water and Ensure Enlive; pt requesting a banana if permitted on his diet. Central line access : PICC line was unable to placed. Patient only with peripheral line at this time. Abdominal Status:  hypoactive active bowel sounds per RN note 4/23. .  Ostomy output: today at 350 ml-described as brown, watery. Monae Osman Pertinent Medications: protonix Pertinent Labs:  
Na 134-trending up, K 4.1, Glu 105, Ca 8.2 (calcium omitted from PPN 4/21 to allow for sodium increase), Hypomagnesemia resolved. Anthropometrics:Height: 5' 8\" (172.7 cm),  Weight: 61.6 kg (135 lb 14.4 oz), Weight Source: Bed, Body mass index is 20.66 kg/m². BMI class of underweight for age >71. Last 3 Recorded Weights in this Encounter 04/20/19 1421 04/21/19 1423 04/23/19 0543 Weight: 60.2 kg (132 lb 12.8 oz) 59.4 kg (130 lb 15.3 oz) 61.6 kg (135 lb 14.4 oz) Admission weight: 62.69 kg (Bed weight) Macronutrient needs: Based on weight of 60.6 kg: EER:  3907-9072 kcal /day (25-30 kcal/kg listed BW) EPT:  42-84 HYCBE protein/day (1.2-1.5 grams/kg BW) GFR >60 Max CHO:  349 grams/day (4mg/kg BW/min) Fluid:  1ml/kcal 
Intake/Comparative Standards:   
Unable to determine po contributions to needs secondary to no recorded po intake. Pt verbalizes 3 Ensure Enlive per day.  
PPN: 5%DEX/ 4.25%AA; at 110 ml/hr with 250 ml 20% Lipids daily- provides 1397 kcal/d (~90% of needs), 112 grams of protein/d (>100% of needs), 132 grams of CHO/d (does not exceed maximum CHO load) and 2890 ml of total volume/d ( >100% of needs).   
Intervention:  
Meals and snacks: Full Liquids. Food Preferences: water. Discontinued broth per patient request. Declines juice, tea, desserts, milk, yogurt, cream soup or hot cereal. Check with MD r/t allowance of banana on full liquid per patient preference. Nutrition Supplement:  Continue Ensure Enlive on all meal trays; pt states any flavor ok. Nutritional Supplement Therapy: Electrolyte replacement per nutritional support protocols are active on MAR. PN: 1. Continue PPN: 5%DEX/ 4.25%AA at 110 ml/hr with 250 ml 20% Lipids daily. No changes today 2. If it is anticipated that patient will continue to require parenteral nutrition for primary needs may need to consider alternate central line access in order to more closely meet estimated needs and increase the amount of electrolytes that can be provided in the formulation with a TPN solution. 3. Continue PN labs, POC Glucoses/SSI if Glucose > 180 mg/dl on AM BMP. Coordination of Care: Burt Huertas RN, Paz Barillas, Case Management, Cheyenne Dueñas, Pharmacist.   
 
Melonie Morris, [address] [place] [address], [address] [place] [address],  
[phone] Addendum:  Per Dr. Dane Graff, keep patient on a full liquid diet; no banana at this time. Melonie Morris, MPH, [address] [place] [address],  
[phone]

## 2019-04-23 NOTE — PROGRESS NOTES
Admit Date: 3/30/2019 POD 20 Days Post-Op Procedure:  Procedure(s): EXPLORATORY LAPAROTOMY, LYSIS OF ADHESIONS, SMALL BOWEL RESECTION X1, ENTEROTOMY REPAIR X3, REPAIR OF CYSTOTOMY Subjective:  
 
Patient has complaints of cramps. Pain control has been fair. Worried about diet choices/level. Wants off bed alarm. Staff reports he's not eating because of taste preferences, not cramps Objective:  
 
Visit Vitals /77 (BP 1 Location: Left arm, BP Patient Position: At rest;Supine) Pulse (!) 102 Temp 99.3 °F (37.4 °C) Resp 18 Ht 5' 8\" (1.727 m) Wt 135 lb 14.4 oz (61.6 kg) SpO2 90% BMI 20.66 kg/m² Temp (24hrs), Av.1 °F (37.3 °C), Min:98.7 °F (37.1 °C), Max:99.5 °F (37.5 °C) Keagan Broach I&O reviewed as documented. Physical Exam:  
 General-in no distress. Abdomen- Wound clean, dry, no drainage and pt has mild appropriately distributed tenderness with palpable peristalsis. Liquid stool in colostomy. Possible superior pole incisional hernia. Labs:  
Recent Results (from the past 24 hour(s)) PSA SCREENING (SCREENING) Collection Time: 19  5:58 AM  
Result Value Ref Range Prostate Specific Ag 0.7 <4.0 ng/mL METABOLIC PANEL, BASIC Collection Time: 19  6:01 AM  
Result Value Ref Range Sodium 134 (L) 136 - 145 mmol/L Potassium 4.1 3.5 - 5.1 mmol/L Chloride 101 98 - 107 mmol/L  
 CO2 24 21 - 32 mmol/L Anion gap 9 mmol/L Glucose 105 (H) 65 - 100 mg/dL BUN 22 8 - 23 MG/DL Creatinine 0.59 (L) 0.8 - 1.5 MG/DL  
 GFR est AA >60 >60 ml/min/1.73m2 GFR est non-AA >60 ml/min/1.73m2 Calcium 8.2 (L) 8.3 - 10.4 MG/DL MAGNESIUM Collection Time: 19  6:01 AM  
Result Value Ref Range Magnesium 1.9 1.8 - 2.4 mg/dL Assessment:  
 
Principal Problem: 
  Small bowel obstruction ([address] (3/30/2019) Active Problems: 
  Fall in shower (2019) Traumatic injury of head (2019) Forehead laceration, initial encounter (4/20/2019) Plan/Recommendations/Medical Decision Making:  
 
Keep on full liquids Add levsin Will get SBFT tomorrow- if GI tract is open, even with moderate stricture, I think he should go home soon to regulate his own diet. A severe stricture may require exploration Start flomax- if fails brito removal in 48hrs, will need urology. He reports he's taken flomax every time in the hospital but didn't mention it when he came in this time. I suspect he is going to have a protracted recovery and may need a modified diet for a prolonged period of time.

## 2019-04-23 NOTE — PROGRESS NOTES
Patient asleep in bed, no s/s distress/discomfort noted. Call bell and bed alarm remain in place for patient safety. Will monitor.

## 2019-04-23 NOTE — PROGRESS NOTES
Patient received resting in bed. Medicated for complaints of abdominal pain (7/10) with dilaudid 0.5mg iv per MD order. Shift assessment completed. Call bell and bed alarm in place for patient safety. Will monitor.

## 2019-04-24 ENCOUNTER — APPOINTMENT (OUTPATIENT)
Dept: GENERAL RADIOLOGY | Age: 74
DRG: 330 | End: 2019-04-24
Attending: SURGERY
Payer: MEDICARE

## 2019-04-24 LAB
ANION GAP SERPL CALC-SCNC: 7 MMOL/L
BUN SERPL-MCNC: 20 MG/DL (ref 8–23)
CALCIUM SERPL-MCNC: 7.7 MG/DL (ref 8.3–10.4)
CHLORIDE SERPL-SCNC: 99 MMOL/L (ref 98–107)
CO2 SERPL-SCNC: 26 MMOL/L (ref 21–32)
CREAT SERPL-MCNC: 0.59 MG/DL (ref 0.8–1.5)
GLUCOSE SERPL-MCNC: 105 MG/DL (ref 65–100)
MAGNESIUM SERPL-MCNC: 1.6 MG/DL (ref 1.8–2.4)
MM INDURATION POC: 0 MM (ref 0–5)
PHOSPHATE SERPL-MCNC: 2.5 MG/DL (ref 2.3–3.7)
POTASSIUM SERPL-SCNC: 4.2 MMOL/L (ref 3.5–5.1)
PPD POC: NEGATIVE NEGATIVE
SODIUM SERPL-SCNC: 132 MMOL/L (ref 136–145)

## 2019-04-24 PROCEDURE — 74011000250 HC RX REV CODE- 250: Performed by: SURGERY

## 2019-04-24 PROCEDURE — 74011000258 HC RX REV CODE- 258: Performed by: SURGERY

## 2019-04-24 PROCEDURE — 74011636320 HC RX REV CODE- 636/320: Performed by: SURGERY

## 2019-04-24 PROCEDURE — 74250 X-RAY XM SM INT 1CNTRST STD: CPT

## 2019-04-24 PROCEDURE — 80048 BASIC METABOLIC PNL TOTAL CA: CPT

## 2019-04-24 PROCEDURE — 74011250636 HC RX REV CODE- 250/636: Performed by: SURGERY

## 2019-04-24 PROCEDURE — C9113 INJ PANTOPRAZOLE SODIUM, VIA: HCPCS | Performed by: SURGERY

## 2019-04-24 PROCEDURE — 83735 ASSAY OF MAGNESIUM: CPT

## 2019-04-24 PROCEDURE — 65270000029 HC RM PRIVATE

## 2019-04-24 PROCEDURE — 36415 COLL VENOUS BLD VENIPUNCTURE: CPT

## 2019-04-24 PROCEDURE — 74011250637 HC RX REV CODE- 250/637: Performed by: SURGERY

## 2019-04-24 PROCEDURE — 84100 ASSAY OF PHOSPHORUS: CPT

## 2019-04-24 RX ORDER — MAGNESIUM SULFATE HEPTAHYDRATE 40 MG/ML
2 INJECTION, SOLUTION INTRAVENOUS ONCE
Status: COMPLETED | OUTPATIENT
Start: 2019-04-24 | End: 2019-04-24

## 2019-04-24 RX ADMIN — AMITRIPTYLINE HYDROCHLORIDE 100 MG: 50 TABLET, FILM COATED ORAL at 21:20

## 2019-04-24 RX ADMIN — PANTOPRAZOLE SODIUM 40 MG: 40 INJECTION, POWDER, FOR SOLUTION INTRAVENOUS at 10:56

## 2019-04-24 RX ADMIN — OLANZAPINE 2.5 MG: 2.5 TABLET, FILM COATED ORAL at 21:20

## 2019-04-24 RX ADMIN — HYDROMORPHONE HYDROCHLORIDE 0.5 MG: 2 INJECTION INTRAMUSCULAR; INTRAVENOUS; SUBCUTANEOUS at 19:48

## 2019-04-24 RX ADMIN — ZOLPIDEM TARTRATE 5 MG: 5 TABLET ORAL at 23:35

## 2019-04-24 RX ADMIN — MAGNESIUM SULFATE HEPTAHYDRATE 2 G: 40 INJECTION, SOLUTION INTRAVENOUS at 10:56

## 2019-04-24 RX ADMIN — OLANZAPINE 5 MG: 2.5 TABLET, FILM COATED ORAL at 02:08

## 2019-04-24 RX ADMIN — DIATRIZOATE MEGLUMINE AND DIATRIZOATE SODIUM 480 ML: 660; 100 LIQUID ORAL; RECTAL at 10:00

## 2019-04-24 RX ADMIN — TAMSULOSIN HYDROCHLORIDE 0.4 MG: 0.4 CAPSULE ORAL at 12:15

## 2019-04-24 RX ADMIN — I.V. FAT EMULSION 250 ML: 20 EMULSION INTRAVENOUS at 17:53

## 2019-04-24 RX ADMIN — HYOSCYAMINE SULFATE 0.12 MG: 0.12 TABLET ORAL at 17:16

## 2019-04-24 RX ADMIN — HYOSCYAMINE SULFATE 0.12 MG: 0.12 TABLET ORAL at 05:22

## 2019-04-24 RX ADMIN — Medication 1 AMPULE: at 21:21

## 2019-04-24 RX ADMIN — HYOSCYAMINE SULFATE 0.12 MG: 0.12 TABLET ORAL at 23:35

## 2019-04-24 RX ADMIN — I.V. FAT EMULSION 250 ML: 20 EMULSION INTRAVENOUS at 17:54

## 2019-04-24 RX ADMIN — VITAMIN D, TAB 1000IU (100/BT) 2000 UNITS: 25 TAB at 12:15

## 2019-04-24 RX ADMIN — Medication 1 AMPULE: at 10:56

## 2019-04-24 RX ADMIN — POTASSIUM CHLORIDE: 2 INJECTION, SOLUTION, CONCENTRATE INTRAVENOUS at 17:53

## 2019-04-24 RX ADMIN — CLONAZEPAM 4 MG: 1 TABLET ORAL at 21:20

## 2019-04-24 RX ADMIN — HYOSCYAMINE SULFATE 0.12 MG: 0.12 TABLET ORAL at 12:15

## 2019-04-24 RX ADMIN — LEVOTHYROXINE SODIUM 125 MCG: 125 TABLET ORAL at 05:22

## 2019-04-24 NOTE — PROGRESS NOTES
Nutrition follow-up: 
TPN management per nutrition support protocols/ Dr. Claudia Calderon 4/09. Assessment:  
TPN ADULT - dextrose 5% amino acid 4.25% DIET FULL LIQUID 
DIET NUTRITIONAL SUPPLEMENTS All Meals; Ensure Enlive (PPN formulation secondary access) Food/Nutrition History: The patient is POD 19- ex lap, lysis of adhesions, small bowel resection, enterotomy repair x3 and repair of cystotomy. The patient remains on a full liquid diet with minimal acceptance of full liquid items; pt accepting only water and Ensure Enlive while on full liquids. Pt had no po intake this am; scheduled for SBFT this morning; pt back in room past procedure; states he is slightly nauseated and does not want anything right now; also is anxious but does not want any medication for anxiety because it makes it difficult for him to get up when medicated for anxiety. Central line access : PICC line was unable to placed. Patient only with peripheral line at this time. Abdominal Status:  hypoactive active bowel sounds. .  Ostomy output: today at 500 ml-described as brown, watery. Pertinent Medications: protonix Pertinent Labs:  
Na 132,  K 4.2, Glu 105, Ca 7.9 (calcium omitted from PPN 4/21 to allow for sodium increase), Hypomagnesemia 1.6-received Mg bolus per ERP. Anthropometrics:Height: 5' 8\" (172.7 cm),  Weight: 61.6 kg (135 lb 14.4 oz), Weight Source: Bed, Body mass index is 20.66 kg/m². BMI class of underweight for age >71. Last 3 Recorded Weights in this Encounter 04/20/19 1421 04/21/19 1423 04/23/19 0543 Weight: 60.2 kg (132 lb 12.8 oz) 59.4 kg (130 lb 15.3 oz) 61.6 kg (135 lb 14.4 oz) Admission weight: 62.69 kg (Bed weight) Macronutrient needs: Based on weight of 60.6 kg: EER:  3971-1726 kcal /day (25-30 kcal/kg listed BW) BDW:  19-36 AITLH protein/day (1.2-1.5 grams/kg BW) GFR >60 Max CHO:  349 grams/day (4mg/kg BW/min) Fluid:  1ml/kcal 
Intake/Comparative Standards: No recorded intake; Unable to determine po contributions to needs; pt verbalizes no po intake so far today.  
PPN: 5%DEX/ 4.25%AA; at 110 ml/hr with 250 ml 20% Lipids daily- provides 1397 kcal/d (~77% of needs), 112 grams of protein/d (>100% of needs), 132 grams of CHO/d (does not exceed maximum CHO load) and 2890 ml of total volume/d ( >100% of needs).   
Intervention:  
Meals and snacks: Full Liquids. Food Preferences:  Declines juice, tea, desserts, milk, yogurt, cream soup or hot cereal. Has been accepting water and Ensure Enlive only. Nutrition Supplement:  Continue Ensure Enlive on all meal trays; pt states any flavor ok. Nutritional Supplement Therapy: Electrolyte replacement per nutritional support protocols are active on MAR. PN: 1. Continue PPN: 5%DEX/ 4.25%AA at 110 ml/hr with 250 ml 20% Lipids daily. No changes today 2. If it is anticipated that patient will continue to require parenteral nutrition for primary needs may need to consider alternate central line access in order to more closely meet estimated needs and increase the amount of electrolytes that can be provided in the formulation with a TPN solution. 3. Continue PN labs, POC Glucoses/SSI if Glucose > 180 mg/dl on AM BMP. Coordination of Care: Meryl Vance RN, Rc Jasmine, [address] [place] [address], [address] [place] [address], [address] [place] [address],  
[phone]

## 2019-04-24 NOTE — PROGRESS NOTES
Seen in radiology during SBFT. Feels \"pukey\" still having cramps but also still with colostomy output. Visit Vitals /64 (BP 1 Location: Left arm, BP Patient Position: At rest;Supine) Pulse 100 Temp 98.3 °F (36.8 °C) Resp 18 Ht 5' 8\" (1.727 m) Wt 133 lb (60.3 kg) SpO2 92% BMI 20.22 kg/m²  
  
abd-soft, mildly distended, minimally tender. Early SBFT images (1,2hr) show now overt obstruction, but proximal loops are relatively dilated. Await full exam 
 
Will try to remove brito tomorrow a.m. Do not see much benefit from ongoing inpatient therapy-will discuss with him.

## 2019-04-24 NOTE — PROGRESS NOTES
Pt in bed resting quietly. Resp even & unlabored at rest; no acute signs of distress noted. Bed low & locked with alarm audible; call light in reach; no needs voiced.

## 2019-04-24 NOTE — PROGRESS NOTES
Shift assessment done. AAO x4. Respirations even and unlabored. Not in any signs of distress. Bed low and locked. Call light within reach. Will continue to monitor.

## 2019-04-24 NOTE — PROGRESS NOTES
Pt had a large episode of vomiting. He threw up all over his gown & some into the trash can. States he feels better after throwing up. Denies wanting any Zofran for nausea. Stable with friend at bedside. PPN & lipids infusing with no difficulty. Large amount of watery brown output from the ostomy all day so far. Benito Lucia NP informed of incident. No new orders at this time.

## 2019-04-24 NOTE — PROGRESS NOTES
Problem: Falls - Risk of 
Goal: *Absence of Falls Description Document Anna Jones Fall Risk and appropriate interventions in the flowsheet. Outcome: Progressing Towards Goal 
  
Problem: Nutrition Deficit Goal: *Optimize nutritional status Outcome: Progressing Towards Goal 
  
Problem: Patient Education: Go to Patient Education Activity Goal: Patient/Family Education Outcome: Progressing Towards Goal

## 2019-04-24 NOTE — PROGRESS NOTES
Problem: Falls - Risk of 
Goal: *Absence of Falls Description Document Lanny Rosales Fall Risk and appropriate interventions in the flowsheet. Outcome: Progressing Towards Goal 
  
Problem: Pressure Injury - Risk of 
Goal: *Prevention of pressure injury Description Document Josef Scale and appropriate interventions in the flowsheet. Outcome: Progressing Towards Goal 
  
Problem: Patient Education: Go to Patient Education Activity Goal: Patient/Family Education Outcome: Progressing Towards Goal 
  
Problem: Nutrition Deficit Goal: *Optimize nutritional status Outcome: Not Progressing Towards Goal

## 2019-04-24 NOTE — PROGRESS NOTES
Shift assessment complete. A&O4. Lungs clear on auscultation. Respirations present. Even and unlabored. No s/s of distress. No c/o pain at this time. Call light within reach. Encouraged patient to call for assistance. Patient verbalizes understanding. See Doc Flowsheet for assessment details. Patient resting in bed. Will continue to monitor.

## 2019-04-25 LAB
ANION GAP SERPL CALC-SCNC: 7 MMOL/L
BUN SERPL-MCNC: 36 MG/DL (ref 8–23)
CALCIUM SERPL-MCNC: 8.3 MG/DL (ref 8.3–10.4)
CHLORIDE SERPL-SCNC: 101 MMOL/L (ref 98–107)
CO2 SERPL-SCNC: 25 MMOL/L (ref 21–32)
CREAT SERPL-MCNC: 0.68 MG/DL (ref 0.8–1.5)
GLUCOSE SERPL-MCNC: 131 MG/DL (ref 65–100)
MM INDURATION POC: 0 MM (ref 0–5)
POTASSIUM SERPL-SCNC: 4.3 MMOL/L (ref 3.5–5.1)
PPD POC: NEGATIVE NEGATIVE
SODIUM SERPL-SCNC: 133 MMOL/L (ref 136–145)

## 2019-04-25 PROCEDURE — 74011250636 HC RX REV CODE- 250/636: Performed by: NURSE PRACTITIONER

## 2019-04-25 PROCEDURE — 0T9B70Z DRAINAGE OF BLADDER WITH DRAINAGE DEVICE, VIA NATURAL OR ARTIFICIAL OPENING: ICD-10-PCS | Performed by: UROLOGY

## 2019-04-25 PROCEDURE — 36415 COLL VENOUS BLD VENIPUNCTURE: CPT

## 2019-04-25 PROCEDURE — 80048 BASIC METABOLIC PNL TOTAL CA: CPT

## 2019-04-25 PROCEDURE — 77030008031

## 2019-04-25 PROCEDURE — 74011250636 HC RX REV CODE- 250/636: Performed by: SURGERY

## 2019-04-25 PROCEDURE — 77030034849

## 2019-04-25 PROCEDURE — 65270000029 HC RM PRIVATE

## 2019-04-25 PROCEDURE — 74011000250 HC RX REV CODE- 250: Performed by: SURGERY

## 2019-04-25 PROCEDURE — 74011250637 HC RX REV CODE- 250/637: Performed by: SURGERY

## 2019-04-25 PROCEDURE — 77030011943

## 2019-04-25 PROCEDURE — 77030020263 HC SOL INJ SOD CL0.9% LFCR 1000ML

## 2019-04-25 PROCEDURE — C9113 INJ PANTOPRAZOLE SODIUM, VIA: HCPCS | Performed by: SURGERY

## 2019-04-25 PROCEDURE — 74011000258 HC RX REV CODE- 258: Performed by: SURGERY

## 2019-04-25 RX ORDER — SODIUM CHLORIDE 9 MG/ML
75 INJECTION, SOLUTION INTRAVENOUS CONTINUOUS
Status: DISCONTINUED | OUTPATIENT
Start: 2019-04-25 | End: 2019-05-02

## 2019-04-25 RX ADMIN — HYOSCYAMINE SULFATE 0.12 MG: 0.12 TABLET ORAL at 06:01

## 2019-04-25 RX ADMIN — RIFAXIMIN 200 MG: 200 TABLET ORAL at 22:02

## 2019-04-25 RX ADMIN — HYOSCYAMINE SULFATE 0.12 MG: 0.12 TABLET ORAL at 12:04

## 2019-04-25 RX ADMIN — Medication 1 AMPULE: at 22:02

## 2019-04-25 RX ADMIN — POTASSIUM CHLORIDE: 2 INJECTION, SOLUTION, CONCENTRATE INTRAVENOUS at 18:12

## 2019-04-25 RX ADMIN — HYDROMORPHONE HYDROCHLORIDE 0.5 MG: 2 INJECTION INTRAMUSCULAR; INTRAVENOUS; SUBCUTANEOUS at 19:13

## 2019-04-25 RX ADMIN — VITAMIN D, TAB 1000IU (100/BT) 2000 UNITS: 25 TAB at 17:17

## 2019-04-25 RX ADMIN — LEVOTHYROXINE SODIUM 125 MCG: 125 TABLET ORAL at 06:01

## 2019-04-25 RX ADMIN — Medication 1 AMPULE: at 09:19

## 2019-04-25 RX ADMIN — PANTOPRAZOLE SODIUM 40 MG: 40 INJECTION, POWDER, FOR SOLUTION INTRAVENOUS at 09:19

## 2019-04-25 RX ADMIN — VITAMIN D, TAB 1000IU (100/BT) 2000 UNITS: 25 TAB at 09:19

## 2019-04-25 RX ADMIN — RIFAXIMIN 200 MG: 200 TABLET ORAL at 17:17

## 2019-04-25 RX ADMIN — TAMSULOSIN HYDROCHLORIDE 0.4 MG: 0.4 CAPSULE ORAL at 09:19

## 2019-04-25 RX ADMIN — AMITRIPTYLINE HYDROCHLORIDE 100 MG: 50 TABLET, FILM COATED ORAL at 22:02

## 2019-04-25 RX ADMIN — OLANZAPINE 2.5 MG: 2.5 TABLET, FILM COATED ORAL at 22:02

## 2019-04-25 RX ADMIN — HYOSCYAMINE SULFATE 0.12 MG: 0.12 TABLET ORAL at 17:18

## 2019-04-25 RX ADMIN — SODIUM CHLORIDE 75 ML/HR: 900 INJECTION, SOLUTION INTRAVENOUS at 12:00

## 2019-04-25 RX ADMIN — OLANZAPINE 5 MG: 2.5 TABLET, FILM COATED ORAL at 01:48

## 2019-04-25 RX ADMIN — ZOLPIDEM TARTRATE 5 MG: 5 TABLET ORAL at 22:02

## 2019-04-25 RX ADMIN — HYOSCYAMINE SULFATE 0.12 MG: 0.12 TABLET ORAL at 23:22

## 2019-04-25 RX ADMIN — I.V. FAT EMULSION 250 ML: 20 EMULSION INTRAVENOUS at 18:14

## 2019-04-25 RX ADMIN — CLONAZEPAM 4 MG: 1 TABLET ORAL at 22:14

## 2019-04-25 NOTE — PROGRESS NOTES
Problem: Falls - Risk of 
Goal: *Absence of Falls Description Document Vicki Pulliam Fall Risk and appropriate interventions in the flowsheet.  
Outcome: Progressing Towards Goal

## 2019-04-25 NOTE — PROGRESS NOTES
Nutrition follow-up: 
TPN management per nutrition support protocols/ Dr. Torie Moreau 4/09. Assessment: DIET NUTRITIONAL SUPPLEMENTS All Meals; Ensure [employer] DIET GI SOFT No options chosen -effective 4/25 at 1100. PPN:  To be discontinued past current bag per Cricket Hurd NP Food/Nutrition History: The patient is POD 22- ex lap, lysis of adhesions, small bowel resection, enterotomy repair x3 and repair of cystotomy. Pt had SBFT 4/24 which showed no bowel obstruction. Pt experienced n/v past procedure which he attributes to the contrast.  Pt reports feeling better today with no n/v; still has stomach cramping which he said is typical for him and no worse past drinking. Pt accepted Ensure Enlive at breakfast this am.  Diet advanced to GI soft at lunch today; pt very hesitant to try solid food; agreed to vanilla yogurt and banana, Ensure Enlive and water. Central line access : PICC line was unable to placed. Patient only with peripheral line at this time. Abdominal Status:  soft, non distended, hypoactive bowel sounds. .  Ostomy output: today at 1500 ml. Pertinent Medications: IVF: 0.9% sodium chloride at 75 ml/hr, Protonix Pertinent Labs:  
Na 133,  K 4.3, Glu 133, Ca 8.3 (calcium omitted from PPN 4/21 to allow for sodium increase) Anthropometrics:Height: 5' 8\" (172.7 cm),  Weight: 60.3 kg (133 lb), Weight Source: Bed, Body mass index is 20.22 kg/m². BMI class of underweight for age >71. Last 3 Recorded Weights in this Encounter 04/21/19 1423 04/23/19 0543 04/24/19 1142 Weight: 59.4 kg (130 lb 15.3 oz) 61.6 kg (135 lb 14.4 oz) 60.3 kg (133 lb) Admission weight: 62.69 kg (Bed weight) Macronutrient needs: Based on weight of 60.6 kg: EER:  0253-1031 kcal /day (25-30 kcal/kg listed BW) JHX:  23-09 PYUIZ protein/day (1.2-1.5 grams/kg BW) GFR >60 Max CHO:  349 grams/day (4mg/kg BW/min) Fluid:  1ml/kcal 
Intake/Comparative Standards: No recorded intake; pt has been accepting bottled water and Ensure Enlive-amount varies. Unable to determine po contributions to needs.  
PPN: 5%DEX/ 4.25%AA; at 110 ml/hr with 250 ml 20% Lipids daily- provides 1397 kcal/d (~77% of needs), 112 grams of protein/d (>100% of needs), 132 grams of CHO/d (does not exceed maximum CHO load) and 2890 ml of total volume/d ( >100% of needs).   
Intervention:  
Meals and snacks: GI soft diet; briefly reviewed dietary guidelines for a soft diet and assisted with lunch menu selection; pt hesitant to order any solid food; willing to try banana and yogurt + Ensure Enlive and water. Encouraged po intake. . 
Nutrition Supplement:  Continue Ensure Enlive on all meal trays; pt states any flavor ok. Nutritional Supplement Therapy: Electrolyte replacement per nutritional support protocols are active on MAR. PN:  Discontinue past current bag. Nutrition Discharge Plan:  Too soon to determine. Coordination of Care: Rama Olsen RN, Jay Ray, Pharmacist.   
 
Gaby Loomis, [address] [place] [address], [facility],  
[phone] Addendum:  4/25 at 2:00 pm 
Per Dr. Grisel Corrales, continue with PPN and downgrade diet to Full Liquids. Gaby Loomis, MPH, [facility],  
[phone]

## 2019-04-25 NOTE — PROGRESS NOTES
Pt unable to void since removing the brito catheter. Bladder checked with scanner; 495 noted. Will straight cath pt X1 time per order.

## 2019-04-25 NOTE — PROGRESS NOTES
PLAN: 
GI soft diet Stop PPN after current bag 
BMP in AM 
[email or web address] Information OT eval--PT discharged but while in room patient wasn't willing to empty ostomy or reach items on his table PVR, I/O cath for >400 DC staples CM for rehab placement ASSESSMENT:  Admit Date: 3/30/2019  
22 Days Post-Op  Procedure(s): EXPLORATORY LAPAROTOMY, LYSIS OF ADHESIONS, SMALL BOWEL RESECTION X1, ENTEROTOMY REPAIR X3, REPAIR OF CYSTOTOMY Principal Problem: 
  Small bowel obstruction ([address] (3/30/2019) Active Problems: 
  Fall in shower (4/20/2019) Traumatic injury of head (4/20/2019) Forehead laceration, initial encounter (4/20/2019) HPI: 
This is a 64-year-old male previous patient of Dr. Renato Marcus with history of rectal cancer ileostomy and colostomy. All of the surgeries were performed in 2014. Patient has done well until March 22 1 week ago when he began having abdominal bloating and pain and decreased colostomy output. He was a admitted to the hospital by Erin Sanchez with a small bowel obstruction for 4 days. The patient continued with minimal colostomy output until March 26 when he had 3 large bowel movements and was discharged home. He states that at home he was doing well until yesterday when he ate to solid food meals and once again the abdominal pain reoccurred with decreased ostomy output. His meals can consisted of apples almonds and [place] food with steamed vegetables. He denies any associated nausea or vomiting. He denies fevers. He is here for small bowel obstruction and will be admitted for observation. 
  
SUBJECTIVE: 
Awake in bed, just came back from walking 2 laps around floor with RN assist. Pt frustrated with lack of progression. Denies nausea or vomiting. Did have an isolated episode of vomiting yesterday that he attributed to the contrast for SBFT. The SBFT shows dilated bowel but no obstruction. Ostomy has put out 2.7L of liquid stool/24h.  Edna removed this AM; no void yet. Hyponatremic 133. AF, VSS. Discussed Rehab with patient due to debility r/t recent illness. He is agreeable to STR. Not sure he can care for himself at home. Intake/Output Summary (Last 24 hours) at 4/25/2019 1051 Last data filed at 4/25/2019 6950 Gross per 24 hour Intake 1200 ml Output 4750 ml Net -3550 ml OBJECTIVE: 
Constitutional: Alert oriented cooperative patient in no acute distress; appears stated age Visit Vitals /68 (BP 1 Location: Right arm, BP Patient Position: At rest;Head of bed elevated (Comment degrees)) Pulse 93 Temp 98.3 °F (36.8 °C) Resp 18 Ht 5' 8\" (1.727 m) Wt 133 lb (60.3 kg) SpO2 96% BMI 20.22 kg/m² Eyes:Sclera are clear. ENMT: no external lesions gross hearing normal; no obvious neck masses, no ear or lip lesions, staples intact to left scalp CV: RRR. Normal perfusion Resp: No JVD. Breathing is non-labored; no audible wheezing. GI: soft and non-distended, ostomy intact to left abdomen with pink viable stoma, +liquid output, midline incision healed Musculoskeletal: unremarkable with normal function. No embolic signs or cyanosis. Neuro:  Oriented; moves all 4; no focal deficits Psychiatric: normal affect and mood, no memory impairment Patient Vitals for the past 24 hrs: 
 BP Temp Pulse Resp SpO2 Weight 04/25/19 0806 118/68 98.3 °F (36.8 °C) 93 18 96 %   
04/25/19 0418 132/70 97.8 °F (36.6 °C) 96 18 96 %   
04/24/19 2340 124/73 98.7 °F (37.1 °C) 97 20 93 %   
04/24/19 1955 102/65 99.3 °F (37.4 °C) 97 20 93 %   
04/24/19 1510 111/69 98.5 °F (36.9 °C) (!) 106 20 95 %   
04/24/19 1142 108/64 98.3 °F (36.8 °C) 100 18 92 % 133 lb (60.3 kg) Labs:   
Recent Labs  
  04/25/19 
0449 * K 4.3  CO2 25 BUN 36* CREA 0.68* * Signed:  Nerissa Goldsmith, NP

## 2019-04-25 NOTE — PROGRESS NOTES
Problem: Falls - Risk of 
Goal: *Absence of Falls Description Document Rudi Bunting Fall Risk and appropriate interventions in the flowsheet. Outcome: Progressing Towards Goal 
  
Problem: Pressure Injury - Risk of 
Goal: *Prevention of pressure injury Description Document Josef Scale and appropriate interventions in the flowsheet. Outcome: Progressing Towards Goal 
  
Problem: Nutrition Deficit Goal: *Optimize nutritional status Outcome: Progressing Towards Goal 
  
Problem: Patient Education: Go to Patient Education Activity Goal: Patient/Family Education Outcome: Progressing Towards Goal

## 2019-04-25 NOTE — PROGRESS NOTES
Virgen reviewed chart and spoke with Md this pm. Discussed trying to come up with medically appropriate discharge options. Virgen/Md agreed pt's best discharge plan would be [facility]. Sw placed referral with Beckey Door. Pt being evaluated. Md contacted about this. Will await decision. Will have to discuss changing the PPN to TPN if pt has to go to a skilled facility. Sw to cont to follow and assist. Segundo New was informed pt did not meet criteria for [facility] due to no ICU days and having never been on tele (3 nights). Md aware. Need to talk with pt about going home vs. [facility] [address] [facility]. Per Bette feedings would have to be TPN and not PPN. Changing this (according to Dr. Pankaj Bradshaw) would NOT be optimal for the pt and his medical needs. Sw to discuss further with Md tomorrow. Segundo Echevarria

## 2019-04-25 NOTE — PROGRESS NOTES
Pt in bed resting. Brief placed on pt. Resp even & unlabored; no acute signs of distress noted. Bed low & locked; call light in reach; no needs voiced.

## 2019-04-25 NOTE — CONSULTS
Urology Consult    Subjective:     Date of Consultation:  April 25, 2019    Referring Physician: Ammon Rosenthal MD    Reason for Consultation:  Post op retention     History of Present Illness:   Mr. Fede Winters is a 74 y.o.  male who is being seen for post op urinary retention. He was admitted to the hospital for Small bowel obstruction ([facility] [address] [K56.609]. He is:  22 Days Post-Op  Procedure(s):  EXPLORATORY LAPAROTOMY, LYSIS OF ADHESIONS, SMALL BOWEL RESECTION X1, ENTEROTOMY REPAIR X3, REPAIR OF CYSTOTOMY    Brito removed this AM and he is unable to void. Straight catheter x 1 and still unable to void. Nursing replaced brito.       Past Medical History:   Diagnosis Date    Family history of malignant neoplasm of gastrointestinal tract     mother colon/ovarian cancer 67    Fecal incontinence     LAR syndrome    Former cigarette smoker     History of rectal cancer     Hypothyroid     stable w/med    Infection and inflammatory reaction due to other internal prosthetic devices, implants and grafts, subsequent encounter 2017    abd wound/mesh colostomy    Insomnia     takes meds    Osteoarthritis, hand     Personal history of colonic polyps 9/2013    x 1    Personal history of malignant neoplasm of rectum, rectosigmoid junction, and anus 9/2013    Psychiatric disorder     anxiety      Past Surgical History:   Procedure Laterality Date    COLONOSCOPY N/A 2/12/2018    COLONOSCOPY / BMI=21 performed by Wallie Sicard, MD at [address] [place] [address]  2/12/2018         ENDOSCOPY, COLON, DIAGNOSTIC  last 2/3/15    [facility]ily--no polyps--3 year recall    HX COLECTOMY  11/2013    Emily--lap LAR with coloproctostomy, mobilization splenic flexure, diverting loop ileostomy    HX COLECTOMY Right 8/2014    for leak    HX COLOSTOMY  5/11/16    HX GI  4/2016    Sacral nerve stimlator lead trial (unsucessful) and removal    HX HERNIA REPAIR  3/2015, 5/2016    HX HERNIA REPAIR      and Colostomy in May    HX OTHER SURGICAL  10/7/2013    Emily---endorectal us    HX OTHER SURGICAL Bilateral     cataracts  2017    HX POLYPECTOMY      HX VASCULAR ACCESS Left     single lumen port/subsequently removed      Family History   Problem Relation Age of Onset    Cancer Mother         colon and ovarian    Cancer Brother         prostate    Alcohol abuse Brother     Cancer Maternal Grandmother         bone    Alcohol abuse Father     Alcohol abuse Sister     Stroke Paternal Grandfather       Social History     Tobacco Use    Smoking status: Former Smoker     Last attempt to quit: 1963     Years since quittin.4    Smokeless tobacco: Never Used   Substance Use Topics    Alcohol use: Not Currently     Alcohol/week: 7.0 oz     Types: 14 Cans of beer per week     Comment: advised stop drinking given issues     No Known Allergies   Prior to Admission medications    Medication Sig Start Date End Date Taking? Authorizing Provider   OLANZapine (ZYPREXA) 2.5 mg tablet Take 2.5 mg by mouth nightly. Indications: TAKES 3 PILLS qHS   Yes Provider, Historical   levothyroxine (SYNTHROID) 125 mcg tablet TAKE ONE TABLET BY MOUTH ONE TIME DAILY BEFORE BREAKFAST 10/15/18  Yes Roger Álvarez, DO   Cholecalciferol, Vitamin D3, (VITAMIN D3) 2,000 unit cap capsule Take 2,000 Units by mouth two (2) times a day. 10/15/18  Yes Roger Álvarez DO   clonazePAM (KLONOPIN) 2 mg tablet Take 2 Tabs by mouth nightly. Max Daily Amount: 4 mg. Indications: takes for sleep  Patient taking differently: Take 2 mg by mouth nightly. Dr Mallory Diego psych  Indications: takes for sleep-TAKES 3PILLS 16  Yes Gurdeep West MD   amitriptyline (ELAVIL) 100 mg tablet Take 100 mg by mouth nightly. Dr Mallory Diego psych  Indications: takes for sleep   Yes Provider, Historical         Review of Systems:  A comprehensive review of systems was negative except for that written in the HPI.     Objective:     Patient Vitals for the past 8 hrs:   BP Temp Pulse Resp SpO2 Weight   19 1158 113/62 97.8 °F (36.6 °C) 95 18 98 % 127 lb 12.8 oz (58 kg)     Temp (24hrs), Av.4 °F (36.9 °C), Min:97.8 °F (36.6 °C), Max:99.3 °F (37.4 °C)      Intake and Output:    1901 -  0700  In: 2627 [I.V.:2627]  Out: 5350 [Urine:2650]    Physical Exam:            General:    alert, cooperative, no distress                     Skin:  no rash or abnormalities                HEENT:  PERRLA                         Lungs:  clear to auscultation bilaterally      Cardiovascular:  RRR, S1 s2             Abdomen[de-identified]  distended, incisions c/d/i, ostomy             :  no focal abnormalities          Extremities:  peripheral pulses +2       Assessment:     Principal Problem:    Small bowel obstruction ([address] (3/30/2019)    Active Problems:    Fall in shower (2019)      Traumatic injury of head (2019)      Forehead laceration, initial encounter (2019)      Post-op urinary retention. Plan: Brito replaced. Flomax started. Would remove brito in 5-7 days once he is stronger and more ambulatory and once current medical issues resolve. If he fails second TOV, replace brito and he will f/u in our office for brito removal.  Please set up appt if needed at time of d/c. I have reviewed the above note and examined the patient. I agree with the exam, assessment and plan.     Chang Godinez MD

## 2019-04-26 LAB
ANION GAP SERPL CALC-SCNC: 6 MMOL/L
BUN SERPL-MCNC: 24 MG/DL (ref 8–23)
CALCIUM SERPL-MCNC: 8.2 MG/DL (ref 8.3–10.4)
CHLORIDE SERPL-SCNC: 101 MMOL/L (ref 98–107)
CO2 SERPL-SCNC: 25 MMOL/L (ref 21–32)
CREAT SERPL-MCNC: 0.55 MG/DL (ref 0.8–1.5)
GLUCOSE SERPL-MCNC: 109 MG/DL (ref 65–100)
MAGNESIUM SERPL-MCNC: 1.7 MG/DL (ref 1.8–2.4)
MAGNESIUM SERPL-MCNC: 2 MG/DL (ref 1.8–2.4)
PHOSPHATE SERPL-MCNC: 2.2 MG/DL (ref 2.3–3.7)
PHOSPHATE SERPL-MCNC: 3.7 MG/DL (ref 2.3–3.7)
POTASSIUM SERPL-SCNC: 4.1 MMOL/L (ref 3.5–5.1)
SODIUM SERPL-SCNC: 132 MMOL/L (ref 136–145)
SODIUM SERPL-SCNC: 134 MMOL/L (ref 136–145)

## 2019-04-26 PROCEDURE — 74011250637 HC RX REV CODE- 250/637: Performed by: SURGERY

## 2019-04-26 PROCEDURE — 36415 COLL VENOUS BLD VENIPUNCTURE: CPT

## 2019-04-26 PROCEDURE — 83735 ASSAY OF MAGNESIUM: CPT

## 2019-04-26 PROCEDURE — 74011250636 HC RX REV CODE- 250/636: Performed by: NURSE PRACTITIONER

## 2019-04-26 PROCEDURE — 74011250636 HC RX REV CODE- 250/636: Performed by: SURGERY

## 2019-04-26 PROCEDURE — 65270000029 HC RM PRIVATE

## 2019-04-26 PROCEDURE — 84295 ASSAY OF SERUM SODIUM: CPT

## 2019-04-26 PROCEDURE — 74011000258 HC RX REV CODE- 258: Performed by: SURGERY

## 2019-04-26 PROCEDURE — 77030020263 HC SOL INJ SOD CL0.9% LFCR 1000ML

## 2019-04-26 PROCEDURE — 80048 BASIC METABOLIC PNL TOTAL CA: CPT

## 2019-04-26 PROCEDURE — C9113 INJ PANTOPRAZOLE SODIUM, VIA: HCPCS | Performed by: SURGERY

## 2019-04-26 PROCEDURE — 84100 ASSAY OF PHOSPHORUS: CPT

## 2019-04-26 PROCEDURE — 74011000250 HC RX REV CODE- 250: Performed by: SURGERY

## 2019-04-26 RX ORDER — MAGNESIUM SULFATE HEPTAHYDRATE 40 MG/ML
2 INJECTION, SOLUTION INTRAVENOUS ONCE
Status: COMPLETED | OUTPATIENT
Start: 2019-04-26 | End: 2019-04-26

## 2019-04-26 RX ADMIN — I.V. FAT EMULSION 250 ML: 20 EMULSION INTRAVENOUS at 17:31

## 2019-04-26 RX ADMIN — CLONAZEPAM 4 MG: 1 TABLET ORAL at 22:04

## 2019-04-26 RX ADMIN — VITAMIN D, TAB 1000IU (100/BT) 2000 UNITS: 25 TAB at 17:16

## 2019-04-26 RX ADMIN — OLANZAPINE 2.5 MG: 2.5 TABLET, FILM COATED ORAL at 22:04

## 2019-04-26 RX ADMIN — AMITRIPTYLINE HYDROCHLORIDE 100 MG: 50 TABLET, FILM COATED ORAL at 22:04

## 2019-04-26 RX ADMIN — Medication 1 AMPULE: at 22:06

## 2019-04-26 RX ADMIN — HYOSCYAMINE SULFATE 0.12 MG: 0.12 TABLET ORAL at 12:06

## 2019-04-26 RX ADMIN — PANTOPRAZOLE SODIUM 40 MG: 40 INJECTION, POWDER, FOR SOLUTION INTRAVENOUS at 10:11

## 2019-04-26 RX ADMIN — MAGNESIUM SULFATE HEPTAHYDRATE 2 G: 40 INJECTION, SOLUTION INTRAVENOUS at 11:59

## 2019-04-26 RX ADMIN — SODIUM CHLORIDE: 900 INJECTION, SOLUTION INTRAVENOUS at 13:59

## 2019-04-26 RX ADMIN — RIFAXIMIN 200 MG: 200 TABLET ORAL at 22:04

## 2019-04-26 RX ADMIN — SODIUM CHLORIDE 75 ML/HR: 900 INJECTION, SOLUTION INTRAVENOUS at 02:56

## 2019-04-26 RX ADMIN — HYOSCYAMINE SULFATE 0.12 MG: 0.12 TABLET ORAL at 17:16

## 2019-04-26 RX ADMIN — OLANZAPINE 5 MG: 2.5 TABLET, FILM COATED ORAL at 01:38

## 2019-04-26 RX ADMIN — POTASSIUM CHLORIDE: 2 INJECTION, SOLUTION, CONCENTRATE INTRAVENOUS at 17:30

## 2019-04-26 RX ADMIN — RIFAXIMIN 200 MG: 200 TABLET ORAL at 16:44

## 2019-04-26 RX ADMIN — LEVOTHYROXINE SODIUM 125 MCG: 125 TABLET ORAL at 05:45

## 2019-04-26 RX ADMIN — Medication 1 AMPULE: at 10:11

## 2019-04-26 RX ADMIN — HYOSCYAMINE SULFATE 0.12 MG: 0.12 TABLET ORAL at 05:45

## 2019-04-26 RX ADMIN — RIFAXIMIN 200 MG: 200 TABLET ORAL at 10:11

## 2019-04-26 RX ADMIN — TAMSULOSIN HYDROCHLORIDE 0.4 MG: 0.4 CAPSULE ORAL at 10:11

## 2019-04-26 RX ADMIN — VITAMIN D, TAB 1000IU (100/BT) 2000 UNITS: 25 TAB at 10:11

## 2019-04-26 NOTE — PROGRESS NOTES
Nutrition follow-up: 
TPN management per nutrition support protocols/ Dr. Saintclair Collard 4/09. Assessment: DIET NUTRITIONAL SUPPLEMENTS All Meals; Ensure [employer] TPN ADULT - dextrose 5% amino acid 4.25% DIET FULL LIQUID Food/Nutrition History: The patient is POD 23- ex lap, lysis of adhesions, small bowel resection, enterotomy repair x3 and repair of cystotomy. Pt had SBFT 4/24 which showed no bowel obstruction. Pt reports feeling weak today and states he may not walk the halls; tolerated Ensure Enlive at breakfast this am, no n/v. Central line access : PICC line was unable to placed. Patient only with peripheral line at this time. 4/25:  Per Dr. Saintclair Collard, he will insert central line if pursued. Abdominal Status:  soft, non distended, active bowel sounds. .  Ostomy output: 4/25: 1600 ml. Pertinent Medications: IVF: 0.9% sodium chloride at 75 ml/hr, Protonix Pertinent Labs:  
Na 133,  K 4.1, Glu 109, Ca 8.2, Mg 1.7, Phos 2.2  (Mg and Phos bolus per ERP) Anthropometrics:Height: 5' 8\" (172.7 cm),  Weight: 58 kg (127 lb 12.8 oz), Weight Source: Bed, Body mass index is 19.43 kg/m². BMI class of underweight for age >71. Last 3 Recorded Weights in this Encounter 04/23/19 0543 04/24/19 1142 04/25/19 1158 Weight: 61.6 kg (135 lb 14.4 oz) 60.3 kg (133 lb) 58 kg (127 lb 12.8 oz) Admission weight: 62.69 kg (Bed weight) Macronutrient needs: Based on weight of 60.6 kg: EER:  5682-7826 kcal /day (25-30 kcal/kg listed BW) JYB:  97-58 LCUJK protein/day (1.2-1.5 grams/kg BW) GFR >60 Max CHO:  349 grams/day (4mg/kg BW/min) Fluid:  1ml/kcal 
Intake/Comparative Standards:   
No recorded intake; pt has been accepting bottled water and Ensure Enlive-amount varies.   Unable to determine po contributions to needs.  
PPN: 5%DEX/ 4.25%AA; at 110 ml/hr with 250 ml 20% Lipids daily- provides 1397 kcal/d (~77% of needs), 112 grams of protein/d (>100% of needs), 132 grams of CHO/d (does not exceed maximum CHO load) and 2890 ml of total volume/d ( >100% of needs).   
Intervention:  
Meals and snacks:  Continue full liquids; advance per MD as medical condition dictates. Encouraged po intake. . 
Nutrition Supplement:  Continue Ensure Enlive on all meal trays; pt states any flavor ok. Nutritional Supplement Therapy: Electrolyte replacement per nutritional support protocols are active on MAR. PN: 1. No changes to current PPN.  5%DEX, 4.25% amino acids at 110 ml/hr with 250 ml 20% lipids daily. Additives:  60 mEq/L sodium chloride, 30 mEq/L potassium chloride, 8 mEq/L magnesium sulfate. 2.  If it is anticipated that patient will continue to require parenteral nutrition for primary needs, may need to consider alternate central line access in order to more closely meet estimated needs and increase the amount of electrolytes that can be provided int the formulation with a TPN solution. 3.  Continue PN labs, POC glucoses/SSI if Glucose > 180 mg/dl on AM BMP Nutrition Discharge Plan:  Too soon to determine. Coordination of Care:  Di Hung, [facility], MPH, [place], [place] 
[phone]

## 2019-04-26 NOTE — PROGRESS NOTES
Pt resting in bed. No distress noted. Healing midline incision open to air noted to abd. Laceration with staples intact to left forehead. Pt acknowledges need to call for assist out of bed.

## 2019-04-26 NOTE — PROGRESS NOTES
OT note: order received and chart reviewed. Attempted to see pt who is stated he is suppose to stay in bed. Spoke with Jayant Liu RN who stated she would have to clarify that with Dr. Claudia Calderon. Re attempted in the afternoon and Jayant Liu is still unsure of orders from MD will check back tomorrow.   
Katarina Oconnell, OT

## 2019-04-26 NOTE — PROGRESS NOTES
No new issues- today, focused on weakness Visit Vitals /70 (BP 1 Location: Left arm, BP Patient Position: At rest;Head of bed elevated (Comment degrees)) Pulse 94 Temp 98 °F (36.7 °C) Resp 20 Ht 5' 8\" (1.727 m) Wt 133 lb 3.2 oz (60.4 kg) SpO2 95% BMI 20.25 kg/m²  
  
abd soft, distended. Active, possibly mildly obstructive sounding, bowel sounds. Flatus passed per ostomy during exam 
 
Plan- 
Keep brito due to persistent retention Days 2 xifaxan 
ppn- will obtain central access and switch to TPN in next few days, then he would be ready for disposition to rehab/SNF. He is agreeable (resigned) to this. Dr Hawa Gifford covering weekend.

## 2019-04-26 NOTE — PROGRESS NOTES
Shift assessment done. Pt AAO x4. Respirations even and unlabored. HR regular. Abdomen soft with active bowel sounds. Not in any signs of distress. Bed low and locked. Bed alarm on. Call light within reach. Will continue to monitor.

## 2019-04-26 NOTE — PROGRESS NOTES
Report received from off going RN. Patient resting comfortably in room. No distress observed. A/O X4 Incisions. ..left forehead with staples intact and midline incision healing. Pain. ...denies Position. ..resting in bed Potty. .... Kaitlynn brito and OSTOMY @ ~0300. New #20 IV placed in left forearm for PPN and IVF

## 2019-04-26 NOTE — PROGRESS NOTES
SW met with pt to discuss dc plans since he does not meet criteria for [facility]. Pt is agreeable to [facility] for SNF knowing he is unable to go home. [facility] can take pt on TPN but not PPN. Pt aware of payment at 100% with medicare and secondary insurance. CC referral sent to [facility]. [facility] who states he will get a PICC placed and change pt to TPN for a transfer to SNF. Pt will not be able to transfer before 4/29/19. Peter Pettit

## 2019-04-27 LAB
ANION GAP SERPL CALC-SCNC: 8 MMOL/L
BUN SERPL-MCNC: 19 MG/DL (ref 8–23)
CALCIUM SERPL-MCNC: 7.8 MG/DL (ref 8.3–10.4)
CHLORIDE SERPL-SCNC: 100 MMOL/L (ref 98–107)
CO2 SERPL-SCNC: 25 MMOL/L (ref 21–32)
CREAT SERPL-MCNC: 0.48 MG/DL (ref 0.8–1.5)
GLUCOSE SERPL-MCNC: 111 MG/DL (ref 65–100)
POTASSIUM SERPL-SCNC: 3.8 MMOL/L (ref 3.5–5.1)
SODIUM SERPL-SCNC: 133 MMOL/L (ref 136–145)

## 2019-04-27 PROCEDURE — 74011000250 HC RX REV CODE- 250: Performed by: SURGERY

## 2019-04-27 PROCEDURE — 80048 BASIC METABOLIC PNL TOTAL CA: CPT

## 2019-04-27 PROCEDURE — 74011000258 HC RX REV CODE- 258: Performed by: SURGERY

## 2019-04-27 PROCEDURE — 74011250636 HC RX REV CODE- 250/636: Performed by: SURGERY

## 2019-04-27 PROCEDURE — 97165 OT EVAL LOW COMPLEX 30 MIN: CPT

## 2019-04-27 PROCEDURE — 74011250636 HC RX REV CODE- 250/636: Performed by: NURSE PRACTITIONER

## 2019-04-27 PROCEDURE — 74011250637 HC RX REV CODE- 250/637: Performed by: SURGERY

## 2019-04-27 PROCEDURE — 65270000029 HC RM PRIVATE

## 2019-04-27 PROCEDURE — C9113 INJ PANTOPRAZOLE SODIUM, VIA: HCPCS | Performed by: SURGERY

## 2019-04-27 PROCEDURE — 36415 COLL VENOUS BLD VENIPUNCTURE: CPT

## 2019-04-27 PROCEDURE — 97530 THERAPEUTIC ACTIVITIES: CPT

## 2019-04-27 RX ADMIN — SODIUM CHLORIDE 75 ML/HR: 900 INJECTION, SOLUTION INTRAVENOUS at 06:13

## 2019-04-27 RX ADMIN — HYOSCYAMINE SULFATE 0.12 MG: 0.12 TABLET ORAL at 17:07

## 2019-04-27 RX ADMIN — RIFAXIMIN 200 MG: 200 TABLET ORAL at 22:14

## 2019-04-27 RX ADMIN — I.V. FAT EMULSION 250 ML: 20 EMULSION INTRAVENOUS at 17:09

## 2019-04-27 RX ADMIN — Medication 1 AMPULE: at 22:12

## 2019-04-27 RX ADMIN — VITAMIN D, TAB 1000IU (100/BT) 2000 UNITS: 25 TAB at 08:08

## 2019-04-27 RX ADMIN — RIFAXIMIN 200 MG: 200 TABLET ORAL at 17:08

## 2019-04-27 RX ADMIN — PANTOPRAZOLE SODIUM 40 MG: 40 INJECTION, POWDER, FOR SOLUTION INTRAVENOUS at 08:08

## 2019-04-27 RX ADMIN — VITAMIN D, TAB 1000IU (100/BT) 2000 UNITS: 25 TAB at 17:08

## 2019-04-27 RX ADMIN — POTASSIUM CHLORIDE: 2 INJECTION, SOLUTION, CONCENTRATE INTRAVENOUS at 17:09

## 2019-04-27 RX ADMIN — HYOSCYAMINE SULFATE 0.12 MG: 0.12 TABLET ORAL at 14:04

## 2019-04-27 RX ADMIN — HYOSCYAMINE SULFATE 0.12 MG: 0.12 TABLET ORAL at 00:48

## 2019-04-27 RX ADMIN — LEVOTHYROXINE SODIUM 125 MCG: 125 TABLET ORAL at 05:47

## 2019-04-27 RX ADMIN — HYOSCYAMINE SULFATE 0.12 MG: 0.12 TABLET ORAL at 05:47

## 2019-04-27 RX ADMIN — OLANZAPINE 5 MG: 2.5 TABLET, FILM COATED ORAL at 01:43

## 2019-04-27 RX ADMIN — AMITRIPTYLINE HYDROCHLORIDE 100 MG: 50 TABLET, FILM COATED ORAL at 22:14

## 2019-04-27 RX ADMIN — RIFAXIMIN 200 MG: 200 TABLET ORAL at 08:08

## 2019-04-27 RX ADMIN — HYOSCYAMINE SULFATE 0.12 MG: 0.12 TABLET ORAL at 23:50

## 2019-04-27 RX ADMIN — TAMSULOSIN HYDROCHLORIDE 0.4 MG: 0.4 CAPSULE ORAL at 08:08

## 2019-04-27 RX ADMIN — Medication 1 AMPULE: at 08:08

## 2019-04-27 RX ADMIN — CLONAZEPAM 4 MG: 1 TABLET ORAL at 22:13

## 2019-04-27 RX ADMIN — OLANZAPINE 2.5 MG: 2.5 TABLET, FILM COATED ORAL at 22:14

## 2019-04-27 NOTE — PROGRESS NOTES
Problem: Self Care Deficits Care Plan (Adult) Goal: *Acute Goals and Plan of Care (Insert Text) Description 1. Patient will perform grooming with supervision standing at sink. 2. Patient will perform Upper body dressing with stand by assist, seated. 3. Patient will perform lower body dressing with contact guard assistance. 4. Patient will perform upper and lower body bathing with stand by assistance seated on shower chair. 5. Patient will perform shower transfer with stand by assistance. 6. Patient will participate in 30 + minutes of ADL/ therapeutic exercise/therapeutic activity with min rest breaks to increase activity tolerance for self care. 7. Patient will perform ADL functional mobility in room with stand by assistance. Goals to be achieved in 7 days. Outcome: Progressing Towards Goal 
  
OCCUPATIONAL THERAPY: Initial Assessment, Daily Note and AM 4/27/2019 INPATIENT:   
[place] / [place] / Product Type: Medicare /  
  
NAME/AGE/GENDER: Ara Lombardi is a 74 y.o. male PRIMARY DIAGNOSIS:  Small bowel obstruction ([facility] [K56.609] Small bowel obstruction ([address] Small bowel obstruction ([address] Procedure(s) (LRB): 
EXPLORATORY LAPAROTOMY, LYSIS OF ADHESIONS, SMALL BOWEL RESECTION X1, ENTEROTOMY REPAIR X3, REPAIR OF CYSTOTOMY (N/A) 24 Days Post-Op ICD-10: Treatment Diagnosis:  
 · Generalized Muscle Weakness (M62.81) Precautions/Allergies: 
   Patient has no known allergies. ASSESSMENT:  
Mr. Rajni Aponte presents with above diagnosis. Pt was seen in room with RN present. Pt with catheter, colostomy bag, and 2 IV poles. Pt moves fairly well requiring assistance with medical equipment management.  Pt wanting to walk, assisted with community distance ambulation and did well with straight walking but when in room with obstacles and visual scanning required contact guard assistance to minimal assistance due to loss of balance. This deficit makes bathroom mobility and self care more difficult, therefore, pt would benefit from OT to maximize safety and independence with self care and functional mobility. This section established at most recent assessment PROBLEM LIST (Impairments causing functional limitations): 1. Decreased Strength 2. Decreased ADL/Functional Activities 3. Decreased Transfer Abilities 4. Decreased Balance 5. Decreased Activity Tolerance INTERVENTIONS PLANNED: (Benefits and precautions of occupational therapy have been discussed with the patient.) 1. Activities of daily living training 2. Adaptive equipment training 3. Balance training 4. Therapeutic activity 5. Therapeutic exercise TREATMENT PLAN: Frequency/Duration: Follow patient 3 times per week to address above goals. Rehabilitation Potential For Stated Goals: Good REHAB RECOMMENDATIONS (at time of discharge pending progress):   
Placement: It is my opinion, based on this patient's performance to date, that Mr. Marroquin may benefit from intensive therapy at a [address] [place] [address] after discharge due to the functional deficits listed above that are likely to improve with skilled rehabilitation and concerns that he/she may be unsafe to be unsupervised at home due to generalized weakness and decreased balance . Equipment:  
? None at this time OCCUPATIONAL PROFILE AND HISTORY:  
History of Present Injury/Illness (Reason for Referral): 
weakness Past Medical History/Comorbidities:  
Mr. Cherry Barnes  has a past medical history of Family history of malignant neoplasm of gastrointestinal tract, Fecal incontinence, Former cigarette smoker, History of rectal cancer, Hypothyroid, Infection and inflammatory reaction due to other internal prosthetic devices, implants and grafts, subsequent encounter (2017), Insomnia, Osteoarthritis, hand, Personal history of colonic polyps (9/2013), Personal history of malignant neoplasm of rectum, rectosigmoid junction, and anus (9/2013), and Psychiatric disorder. He also has no past medical history of Adverse effect of anesthesia, Aneurysm ([address] Arrhythmia, Asthma, Autoimmune disease ([address] CAD (coronary artery disease), Chronic kidney disease, Chronic obstructive pulmonary disease ([address] Chronic pain, Coagulation disorder ([address] Diabetes ([address] Difficult intubation, Endocarditis, GERD (gastroesophageal reflux disease), Heart failure ([address] Hypertension, Ill-defined condition, Liver disease, Malignant hyperthermia due to anesthesia, Morbid obesity ([address] Nausea & vomiting, Other ill-defined conditions(799.89), Pseudocholinesterase deficiency, PUD (peptic ulcer disease), Rheumatic fever, Seizures ([address] Stroke ([address] Thromboembolus ([address] Unspecified adverse effect of anesthesia, or Unspecified sleep apnea. Mr. Demetri Coe  has a past surgical history that includes endoscopy, colon, diagnostic (last 2/3/15); hx polypectomy; hx other surgical (10/7/2013); hx colectomy (11/2013); hx colectomy (Right, 8/2014); hx hernia repair (3/2015, 5/2016); hx gi (4/2016); hx colostomy (5/11/16); hx hernia repair; hx other surgical (Bilateral); colonoscopy thru stoma (2/12/2018); colonoscopy (N/A, 2/12/2018); and hx vascular access (Left, 4/14). Social History/Living Environment:  
Home Environment: Other (comment)(townhouse/condo) One/Two Story Residence: Two story Living Alone: Yes Support Systems: Family member(s), Friends \ neighbors Patient Expects to be Discharged to[de-identified] Other (comment)(townhouse/condo) Current DME Used/Available at Home: None Prior Level of Function/Work/Activity: 
Independent Number of Personal Factors/Comorbidities that affect the Plan of Care: Brief history (0):  LOW COMPLEXITY ASSESSMENT OF OCCUPATIONAL PERFORMANCE[de-identified]  
Activities of Daily Living:  
Basic ADLs (From Assessment) Complex ADLs (From Assessment) Feeding: (N/A) Oral Facial Hygiene/Grooming: Stand-by assistance Bathing: Minimum assistance Upper Body Dressing: Minimum assistance Lower Body Dressing: Minimum assistance Toileting: (n/a catheter and colostomy bag) Grooming/Bathing/Dressing Activities of Daily Living Cognitive Retraining Safety/Judgement: Awareness of environment; Fall prevention Functional Transfers Bathroom Mobility: Minimum assistance Toilet Transfer : Minimum assistance Shower Transfer: Moderate assistance Bed/Mat Mobility Supine to Sit: Stand-by assistance(assist to manage all his IV lines) Sit to Supine: Stand-by assistance(assist to manage all his IV lines) Stand to Sit: Stand-by assistance Scooting: Stand-by assistance Most Recent Physical Functioning:  
Gross Assessment: 
  
         
  
Posture: 
Posture (WDL): Exceptions to [facility] Posture Assessment: Forward head, Rounded shoulders Balance: 
Sitting: Intact Standing: Impaired Standing - Static: Good Standing - Dynamic : Occassional Bed Mobility: 
Supine to Sit: Stand-by assistance(assist to manage all his IV lines) Sit to Supine: Stand-by assistance(assist to manage all his IV lines) Scooting: Stand-by assistance Wheelchair Mobility: 
  
Transfers: 
Stand to Sit: Stand-by assistance Patient Vitals for the past 6 hrs: 
 BP BP Patient Position SpO2 Pulse 04/27/19 0759 135/76 At rest;Supine 94 % 95 Mental Status Neurologic State: Alert Orientation Level: Oriented X4 Cognition: Appropriate decision making(possibly some decreased insight into deficits) Perception: Appears intact Perseveration: No perseveration noted Safety/Judgement: Awareness of environment, Fall prevention Physical Skills Involved: 
1. Balance 2. Strength 3. Activity Tolerance Cognitive Skills Affected (resulting in the inability to perform in a timely and safe manner): 1. Executive Function Psychosocial Skills Affected: 1. Environmental Adaptation Number of elements that affect the Plan of Care: 3-5:  MODERATE COMPLEXITY CLINICAL DECISION MAKING:  
[employer] AM-PAC 6 Clicks Daily Activity Inpatient Short Form How much help from another person does the patient currently need. .. Total A Lot A Little None 1. Putting on and taking off regular lower body clothing? ? 1   ? 2   ? 3   ? 4  
2. Bathing (including washing, rinsing, drying)? ? 1   ? 2   ? 3   ? 4  
3. Toileting, which includes using toilet, bedpan or urinal?   ? 1   ? 2   ? 3   ? 4  
4. Putting on and taking off regular upper body clothing? ? 1   ? 2   ? 3   ? 4  
5. Taking care of personal grooming such as brushing teeth? ? 1   ? 2   ? 3   ? 4  
6. Eating meals? ? 1   ? 2   ? 3   ? 4  
© 2007, Trustees of [facility] [employer], under license to [employer]. All rights reserved Score:  Initial: 19 Most Recent: X (Date: -- ) Interpretation of Tool:  Represents activities that are increasingly more difficult (i.e. Bed mobility, Transfers, Gait). Medical Necessity:    
· Patient is expected to demonstrate progress in strength, balance, coordination and functional technique ·  to increase independence with self care · . Reason for Services/Other Comments: 
· pt would benefit from OT to maximize safety and independence with self care and functional mobility. Use of outcome tool(s) and clinical judgement create a POC that gives a: LOW COMPLEXITY  
 
 
 
TREATMENT:  
(In addition to Assessment/Re-Assessment sessions the following treatments were rendered) Pre-treatment Symptoms/Complaints:   
Pain: Initial:  
Pain Intensity 1: 0 0 Post Session:  0 Therapeutic Activity: (20 min): Therapeutic activities including Bed transfers and Ambulation on level ground to improve mobility, strength and balance. Required minimal   to promote dynamic balance in standing. OT evaluation Braces/Orthotics/Lines/Etc:  
· IV 
· brito catheter · Colostomy · O2 Device: Room air Treatment/Session Assessment:   
· Response to Treatment:  pt tolerated well but was certainly tired after the long ambulation · Interdisciplinary Collaboration:  
o Occupational Therapist 
o Registered Nurse 
o  · After treatment position/precautions:  
o Supine in bed 
o Bed in low position 
o Call light within reach 
o RN notified · Compliance with Program/Exercises: Compliant all of the time, Will assess as treatment progresses. · Recommendations/Intent for next treatment session: \"Next visit will focus on advancements to more challenging activities and reduction in assistance provided\". Total Treatment Duration: OT Patient Time In/Time Out Time In: 5821 Time Out: 9982 Deb Calloway, OT

## 2019-04-27 NOTE — PROGRESS NOTES
Pt assessment completed. Alert and oriented. Lungs CTA even and unlabored. Abdomen soft and non tender with active bowel sounds. Ostomy present, offered to change pouch today, pt refused. IV present and infusing fluids and TPN. Pt denies pain needs at this time, all needs met, will continue to monitor.

## 2019-04-27 NOTE — PROGRESS NOTES
Nutrition F/U: TPN Management- Reviewed, no change to TPN at this time. Phos 3.7 and Mg 2.0 after boluses yesterday. Current TPN contains no Phos. If Phos is persistently low, then could add Phos back to TPN. Na 133- receiving IVF of NS at 75 ml/hr in addition to TPN which contains 60 meq Na/L. Amount of Na in TPN is limited by osmolality constraints for PPN. Noted eventual plan for central line placement. Once this is achieved, Na can be increased in TPN. Jannette Jacobs, [address], [address] [place] [address], [address] [place] [address]

## 2019-04-27 NOTE — PROGRESS NOTES
General Surgery Progress Note 4/27/2019 Admit Date: 3/30/2019 Subjective:  
 
Surgery On Call (for Dr. Piotr Azul) No new changes overnight. Objective:  
 
Visit Vitals /80 (BP 1 Location: Left arm, BP Patient Position: At rest) Pulse 99 Temp 97.9 °F (36.6 °C) Resp 16 Ht 5' 8\" (1.727 m) Wt 133 lb 3.2 oz (60.4 kg) SpO2 97% BMI 20.25 kg/m² Intake/Output Summary (Last 24 hours) at 4/27/2019 1694 Last data filed at 4/27/2019 3213 Gross per 24 hour Intake  Output 4275 ml Net -4275 ml EXAM:  ABD mild to moderate distention, mild diffuse tenderness, ostomy in LLQ with small amount of output. Midline wound intact. Data Review Recent Results (from the past 24 hour(s)) MAGNESIUM Collection Time: 04/26/19  6:50 PM  
Result Value Ref Range Magnesium 2.0 1.8 - 2.4 mg/dL SODIUM Collection Time: 04/26/19  6:50 PM  
Result Value Ref Range Sodium 134 (L) 136 - 145 mmol/L PHOSPHORUS Collection Time: 04/26/19  6:50 PM  
Result Value Ref Range Phosphorus 3.7 2.3 - 3.7 MG/DL  
METABOLIC PANEL, BASIC Collection Time: 04/27/19  6:09 AM  
Result Value Ref Range Sodium 133 (L) 136 - 145 mmol/L Potassium 3.8 3.5 - 5.1 mmol/L Chloride 100 98 - 107 mmol/L  
 CO2 25 21 - 32 mmol/L Anion gap 8 mmol/L Glucose 111 (H) 65 - 100 mg/dL BUN 19 8 - 23 MG/DL Creatinine 0.48 (L) 0.8 - 1.5 MG/DL  
 GFR est AA >60 >60 ml/min/1.73m2 GFR est non-AA >60 ml/min/1.73m2 Calcium 7.8 (L) 8.3 - 10.4 MG/DL Hospital Problems  Date Reviewed: 12/17/2018 Codes Class Noted POA Fall in shower ICD-10-CM: W19. Fortino Catalan ICD-9-CM: E888.9  4/20/2019 No  
   
 Traumatic injury of head ICD-10-CM: S09. 90XA ICD-9-CM: 959.01  4/20/2019 No  
   
 Forehead laceration, initial encounter ICD-10-CM: S01.81XA ICD-9-CM: 873.42  4/20/2019 No  
   
 * (Principal) Small bowel obstruction ([facility] ICD-10-CM: Y31.443 ICD-9-CM: 560.9  3/30/2019 Unknown 1. PPN 2. Xifaxan 3. Central line next week. 4. Limited diet 5. Watchful waiting. 6. Nothing new for today.  
Ernst Davey MD.

## 2019-04-28 LAB
ANION GAP SERPL CALC-SCNC: 8 MMOL/L
BUN SERPL-MCNC: 17 MG/DL (ref 8–23)
CALCIUM SERPL-MCNC: 8.1 MG/DL (ref 8.3–10.4)
CHLORIDE SERPL-SCNC: 100 MMOL/L (ref 98–107)
CO2 SERPL-SCNC: 25 MMOL/L (ref 21–32)
CREAT SERPL-MCNC: 0.52 MG/DL (ref 0.8–1.5)
GLUCOSE SERPL-MCNC: 109 MG/DL (ref 65–100)
POTASSIUM SERPL-SCNC: 3.7 MMOL/L (ref 3.5–5.1)
SODIUM SERPL-SCNC: 133 MMOL/L (ref 136–145)

## 2019-04-28 PROCEDURE — 77030020263 HC SOL INJ SOD CL0.9% LFCR 1000ML

## 2019-04-28 PROCEDURE — 80048 BASIC METABOLIC PNL TOTAL CA: CPT

## 2019-04-28 PROCEDURE — 36415 COLL VENOUS BLD VENIPUNCTURE: CPT

## 2019-04-28 PROCEDURE — 74011000250 HC RX REV CODE- 250: Performed by: SURGERY

## 2019-04-28 PROCEDURE — 74011250637 HC RX REV CODE- 250/637: Performed by: SURGERY

## 2019-04-28 PROCEDURE — C9113 INJ PANTOPRAZOLE SODIUM, VIA: HCPCS | Performed by: SURGERY

## 2019-04-28 PROCEDURE — 74011250636 HC RX REV CODE- 250/636: Performed by: NURSE PRACTITIONER

## 2019-04-28 PROCEDURE — 74011000258 HC RX REV CODE- 258: Performed by: SURGERY

## 2019-04-28 PROCEDURE — 74011250636 HC RX REV CODE- 250/636: Performed by: SURGERY

## 2019-04-28 PROCEDURE — 65270000029 HC RM PRIVATE

## 2019-04-28 RX ADMIN — RIFAXIMIN 200 MG: 200 TABLET ORAL at 17:08

## 2019-04-28 RX ADMIN — PANTOPRAZOLE SODIUM 40 MG: 40 INJECTION, POWDER, FOR SOLUTION INTRAVENOUS at 08:11

## 2019-04-28 RX ADMIN — CLONAZEPAM 4 MG: 1 TABLET ORAL at 22:05

## 2019-04-28 RX ADMIN — HYOSCYAMINE SULFATE 0.12 MG: 0.12 TABLET ORAL at 22:06

## 2019-04-28 RX ADMIN — HYOSCYAMINE SULFATE 0.12 MG: 0.12 TABLET ORAL at 17:08

## 2019-04-28 RX ADMIN — Medication 1 AMPULE: at 22:05

## 2019-04-28 RX ADMIN — VITAMIN D, TAB 1000IU (100/BT) 2000 UNITS: 25 TAB at 08:11

## 2019-04-28 RX ADMIN — I.V. FAT EMULSION 250 ML: 20 EMULSION INTRAVENOUS at 17:12

## 2019-04-28 RX ADMIN — RIFAXIMIN 200 MG: 200 TABLET ORAL at 22:05

## 2019-04-28 RX ADMIN — LEVOTHYROXINE SODIUM 125 MCG: 125 TABLET ORAL at 05:40

## 2019-04-28 RX ADMIN — HYOSCYAMINE SULFATE 0.12 MG: 0.12 TABLET ORAL at 05:40

## 2019-04-28 RX ADMIN — OLANZAPINE 5 MG: 2.5 TABLET, FILM COATED ORAL at 02:00

## 2019-04-28 RX ADMIN — OLANZAPINE 2.5 MG: 2.5 TABLET, FILM COATED ORAL at 22:05

## 2019-04-28 RX ADMIN — RIFAXIMIN 200 MG: 200 TABLET ORAL at 08:11

## 2019-04-28 RX ADMIN — ZOLPIDEM TARTRATE 5 MG: 5 TABLET ORAL at 02:00

## 2019-04-28 RX ADMIN — SODIUM CHLORIDE 75 ML/HR: 900 INJECTION, SOLUTION INTRAVENOUS at 21:12

## 2019-04-28 RX ADMIN — POTASSIUM CHLORIDE: 2 INJECTION, SOLUTION, CONCENTRATE INTRAVENOUS at 17:13

## 2019-04-28 RX ADMIN — Medication 1 AMPULE: at 08:11

## 2019-04-28 RX ADMIN — HYOSCYAMINE SULFATE 0.12 MG: 0.12 TABLET ORAL at 12:19

## 2019-04-28 RX ADMIN — AMITRIPTYLINE HYDROCHLORIDE 100 MG: 50 TABLET, FILM COATED ORAL at 22:05

## 2019-04-28 RX ADMIN — TAMSULOSIN HYDROCHLORIDE 0.4 MG: 0.4 CAPSULE ORAL at 08:11

## 2019-04-28 RX ADMIN — VITAMIN D, TAB 1000IU (100/BT) 2000 UNITS: 25 TAB at 17:08

## 2019-04-28 NOTE — PROGRESS NOTES
General Surgery Progress Note 4/28/2019 Admit Date: 3/30/2019 Subjective:  
 
Surgery On Call (for Dr. Estelle Gonzalez) No changes overnight. Objective:  
 
Visit Vitals /65 (BP 1 Location: Right arm) Pulse 88 Temp 98.6 °F (37 °C) Resp 18 Ht 5' 8\" (1.727 m) Wt 132 lb 4.4 oz (60 kg) SpO2 98% BMI 20.11 kg/m² Intake/Output Summary (Last 24 hours) at 4/28/2019 4185 Last data filed at 4/28/2019 0430 Gross per 24 hour Intake  Output 2200 ml Net -2200 ml EXAM:  ABD soft, mild distention, active BS'S. Ostomy viable with a small output Data Review Recent Results (from the past 24 hour(s)) METABOLIC PANEL, BASIC Collection Time: 04/28/19  6:18 AM  
Result Value Ref Range Sodium 133 (L) 136 - 145 mmol/L Potassium 3.7 3.5 - 5.1 mmol/L Chloride 100 98 - 107 mmol/L  
 CO2 25 21 - 32 mmol/L Anion gap 8 mmol/L Glucose 109 (H) 65 - 100 mg/dL BUN 17 8 - 23 MG/DL Creatinine 0.52 (L) 0.8 - 1.5 MG/DL  
 GFR est AA >60 >60 ml/min/1.73m2 GFR est non-AA >60 ml/min/1.73m2 Calcium 8.1 (L) 8.3 - 10.4 MG/DL Hospital Problems  Date Reviewed: 12/17/2018 Codes Class Noted POA Fall in shower ICD-10-CM: W19. Ken Bojorquez ICD-9-CM: E888.9  4/20/2019 No  
   
 Traumatic injury of head ICD-10-CM: S09. 90XA ICD-9-CM: 959.01  4/20/2019 No  
   
 Forehead laceration, initial encounter ICD-10-CM: S01.81XA ICD-9-CM: 873.42  4/20/2019 No  
   
 * (Principal) Small bowel obstruction ([facility] [address] ICD-10-CM: B54.264 ICD-9-CM: 560.9  3/30/2019 Unknown 1. Continue present care 2. Central line placement this week.  
Lana Brennan MD.

## 2019-04-28 NOTE — PROGRESS NOTES
Nutrition F/U: TPN Management- Reviewed, no change to TPN at this time. Glenda Veliz, [address] [place], [address] [place] [address]

## 2019-04-29 ENCOUNTER — APPOINTMENT (OUTPATIENT)
Dept: GENERAL RADIOLOGY | Age: 74
DRG: 330 | End: 2019-04-29
Attending: SURGERY
Payer: MEDICARE

## 2019-04-29 PROBLEM — S09.90XA TRAUMATIC INJURY OF HEAD: Status: RESOLVED | Noted: 2019-04-20 | Resolved: 2019-04-29
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
## 2019-04-29 PROBLEM — K63.2 ENTEROCUTANEOUS FISTULA: Status: ACTIVE | Noted: 2019-04-29

## 2019-04-29 PROBLEM — S01.81XA FOREHEAD LACERATION, INITIAL ENCOUNTER: Status: RESOLVED | Noted: 2019-04-20 | Resolved: 2019-04-29

## 2019-04-29 LAB
ANION GAP SERPL CALC-SCNC: 7 MMOL/L
BUN SERPL-MCNC: 18 MG/DL (ref 8–23)
CALCIUM SERPL-MCNC: 8 MG/DL (ref 8.3–10.4)
CHLORIDE SERPL-SCNC: 99 MMOL/L (ref 98–107)
CO2 SERPL-SCNC: 26 MMOL/L (ref 21–32)
CREAT SERPL-MCNC: 0.54 MG/DL (ref 0.8–1.5)
GLUCOSE SERPL-MCNC: 108 MG/DL (ref 65–100)
MAGNESIUM SERPL-MCNC: 1.9 MG/DL (ref 1.8–2.4)
PHOSPHATE SERPL-MCNC: 3.2 MG/DL (ref 2.3–3.7)
POTASSIUM SERPL-SCNC: 3.7 MMOL/L (ref 3.5–5.1)
SODIUM SERPL-SCNC: 132 MMOL/L (ref 136–145)

## 2019-04-29 PROCEDURE — C1751 CATH, INF, PER/CENT/MIDLINE: HCPCS

## 2019-04-29 PROCEDURE — 77030020263 HC SOL INJ SOD CL0.9% LFCR 1000ML

## 2019-04-29 PROCEDURE — 84100 ASSAY OF PHOSPHORUS: CPT

## 2019-04-29 PROCEDURE — 71045 X-RAY EXAM CHEST 1 VIEW: CPT

## 2019-04-29 PROCEDURE — 36415 COLL VENOUS BLD VENIPUNCTURE: CPT

## 2019-04-29 PROCEDURE — 74011000258 HC RX REV CODE- 258: Performed by: SURGERY

## 2019-04-29 PROCEDURE — 65270000029 HC RM PRIVATE

## 2019-04-29 PROCEDURE — 74011000250 HC RX REV CODE- 250: Performed by: SURGERY

## 2019-04-29 PROCEDURE — 74011250636 HC RX REV CODE- 250/636: Performed by: NURSE PRACTITIONER

## 2019-04-29 PROCEDURE — 05HY33Z INSERTION OF INFUSION DEVICE INTO UPPER VEIN, PERCUTANEOUS APPROACH: ICD-10-PCS | Performed by: SURGERY

## 2019-04-29 PROCEDURE — 97535 SELF CARE MNGMENT TRAINING: CPT

## 2019-04-29 PROCEDURE — 83735 ASSAY OF MAGNESIUM: CPT

## 2019-04-29 PROCEDURE — 74011250636 HC RX REV CODE- 250/636: Performed by: SURGERY

## 2019-04-29 PROCEDURE — 80048 BASIC METABOLIC PNL TOTAL CA: CPT

## 2019-04-29 PROCEDURE — 74011250637 HC RX REV CODE- 250/637: Performed by: SURGERY

## 2019-04-29 PROCEDURE — 36556 INSERT NON-TUNNEL CV CATH: CPT

## 2019-04-29 RX ADMIN — LEVOTHYROXINE SODIUM 125 MCG: 125 TABLET ORAL at 06:18

## 2019-04-29 RX ADMIN — POTASSIUM CHLORIDE: 2 INJECTION, SOLUTION, CONCENTRATE INTRAVENOUS at 21:17

## 2019-04-29 RX ADMIN — AMITRIPTYLINE HYDROCHLORIDE 100 MG: 50 TABLET, FILM COATED ORAL at 22:27

## 2019-04-29 RX ADMIN — ZOLPIDEM TARTRATE 5 MG: 5 TABLET ORAL at 02:22

## 2019-04-29 RX ADMIN — CLONAZEPAM 4 MG: 1 TABLET ORAL at 22:27

## 2019-04-29 RX ADMIN — HYOSCYAMINE SULFATE 0.12 MG: 0.12 TABLET ORAL at 06:18

## 2019-04-29 RX ADMIN — SODIUM CHLORIDE 75 ML/HR: 900 INJECTION, SOLUTION INTRAVENOUS at 21:18

## 2019-04-29 RX ADMIN — VITAMIN D, TAB 1000IU (100/BT) 2000 UNITS: 25 TAB at 17:22

## 2019-04-29 RX ADMIN — RIFAXIMIN 200 MG: 200 TABLET ORAL at 22:27

## 2019-04-29 RX ADMIN — Medication 1 AMPULE: at 22:27

## 2019-04-29 RX ADMIN — HYOSCYAMINE SULFATE 0.12 MG: 0.12 TABLET ORAL at 22:27

## 2019-04-29 RX ADMIN — HYOSCYAMINE SULFATE 0.12 MG: 0.12 TABLET ORAL at 17:23

## 2019-04-29 RX ADMIN — Medication 1 AMPULE: at 09:23

## 2019-04-29 RX ADMIN — TAMSULOSIN HYDROCHLORIDE 0.4 MG: 0.4 CAPSULE ORAL at 09:23

## 2019-04-29 RX ADMIN — OLANZAPINE 2.5 MG: 2.5 TABLET, FILM COATED ORAL at 22:27

## 2019-04-29 RX ADMIN — RIFAXIMIN 200 MG: 200 TABLET ORAL at 09:23

## 2019-04-29 RX ADMIN — RIFAXIMIN 200 MG: 200 TABLET ORAL at 16:01

## 2019-04-29 RX ADMIN — HYDROMORPHONE HYDROCHLORIDE 1 MG: 2 INJECTION INTRAMUSCULAR; INTRAVENOUS; SUBCUTANEOUS at 16:01

## 2019-04-29 RX ADMIN — I.V. FAT EMULSION 250 ML: 20 EMULSION INTRAVENOUS at 21:17

## 2019-04-29 RX ADMIN — VITAMIN D, TAB 1000IU (100/BT) 2000 UNITS: 25 TAB at 09:23

## 2019-04-29 RX ADMIN — OLANZAPINE 5 MG: 2.5 TABLET, FILM COATED ORAL at 02:22

## 2019-04-29 NOTE — PROGRESS NOTES
Shift assessment complete. Pt demanding, alert and oriented x4. Respirations even and unlabored. Lung sounds clear on room air. HR regular. Abdomen soft, active bowel sounds heard in all four quadrants. Ostomy intact. Bishop draining clear, yellow urine to gravity. IV patent and infusing. Bilateral SCDs in place. Pt denies pain and nausea. Genie fall total score 5. Fall risk bracelet, socks, and sings in place. Call light and personal items within reach. Will continue to monitor.

## 2019-04-29 NOTE — PERIOP NOTES
TRANSFER - IN REPORT: 
 
Verbal report received from Yoselyn Rodrigues RN(name) on Macy Richardson  being received from 345(unit) for ordered procedure Report consisted of patients Situation, Background, Assessment and  
Recommendations(SBAR). Information from the following report(s) SBAR, Kardex and MAR was reviewed with the receiving nurse. Opportunity for questions and clarification was provided. Assessment completed upon patients arrival to unit and care assumed.

## 2019-04-29 NOTE — PROCEDURES
Pt brought to PACU with consent obtained  Timeout done w/RN  Right IJ TLC placed via Seldinger technique without difficulty under sterile conditions  Ports aspirated/flushed easily x 3  Sterile dressing applied  naye well  CXR pending

## 2019-04-29 NOTE — PROGRESS NOTES
Admit Date: 3/30/2019 POD 26 Days Post-Op Procedure:  Procedure(s): EXPLORATORY LAPAROTOMY, LYSIS OF ADHESIONS, SMALL BOWEL RESECTION X1, ENTEROTOMY REPAIR X3, REPAIR OF CYSTOTOMY Subjective:  
 
Patient has complaints of colostoym bag leakage. Pain control has been fine. No other new issues. Objective:  
 
Visit Vitals /62 (BP 1 Location: Left arm, BP Patient Position: At rest;Head of bed elevated (Comment degrees)) Pulse 60 Temp 97.7 °F (36.5 °C) Resp 18 Ht 5' 8\" (1.727 m) Wt 132 lb 4.8 oz (60 kg) SpO2 95% BMI 20.12 kg/m² Temp (24hrs), Av.3 °F (36.8 °C), Min:97.5 °F (36.4 °C), Max:99 °F (37.2 °C) Paola Lindquist I&O reviewed as documented. Physical Exam:  
 General-in no distress. resp-unlabored CV-reg Abdomen- Wound purulent, fecal drainage from 15mm opening in central portion of otherwise healed midline wound. Stool also noted in colostomy pouich Labs:  
Recent Results (from the past 24 hour(s)) MAGNESIUM Collection Time: 19  5:45 AM  
Result Value Ref Range Magnesium 1.9 1.8 - 2.4 mg/dL PHOSPHORUS Collection Time: 19  5:45 AM  
Result Value Ref Range Phosphorus 3.2 2.3 - 3.7 MG/DL  
METABOLIC PANEL, BASIC Collection Time: 19  5:45 AM  
Result Value Ref Range Sodium 132 (L) 136 - 145 mmol/L Potassium 3.7 3.5 - 5.1 mmol/L Chloride 99 98 - 107 mmol/L  
 CO2 26 21 - 32 mmol/L Anion gap 7 mmol/L Glucose 108 (H) 65 - 100 mg/dL BUN 18 8 - 23 MG/DL Creatinine 0.54 (L) 0.8 - 1.5 MG/DL  
 GFR est AA >60 >60 ml/min/1.73m2 GFR est non-AA >60 ml/min/1.73m2 Calcium 8.0 (L) 8.3 - 10.4 MG/DL Assessment:  
 
Principal Problem: 
  Small bowel obstruction ([address] (3/30/2019) Active Problems: 
  Fall in shower (2019) Traumatic injury of head (2019) Forehead laceration, initial encounter (2019) 
 
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

## 2019-04-29 NOTE — PROGRESS NOTES
Care Management Interventions PCP Verified by CM: Yes Transition of Care Consult (CM Consult): Discharge Planning Current Support Network: Own Home Confirm Follow Up Transport: Other (see comment) Plan discussed with Pt/Family/Caregiver: Yes Freedom of Choice Offered: Yes Discharge Location Discharge Placement: Skilled nursing facility Pt had line placed by MD in order to receive TPN, however pt now has a fistula. Per Ammon Ruiz at [facility] pt does not meet Medicare criteria for LTACH ( pt does not have an ICU stay or remote telemetry days ). Chart reviewed. LEIGH spoke with Siva Joseph at [place] [facility] [address] and confirmed he has a bed for pt on Tues ( after line placed by MD and pt is started on TPN ). LEIGH will continue to follow.

## 2019-04-29 NOTE — PROGRESS NOTES
TRANSFER - OUT REPORT: 
 
Verbal report given to Gema Lynn RN (name) on Carine Cons  being transferred to Preop. (unit) for ordered procedure Report consisted of patients Situation, Background, Assessment and  
Recommendations(SBAR). Information from the following report(s) SBAR, Kardex, Intake/Output, MAR and Recent Results was reviewed with the receiving nurse. Lines:  
Peripheral IV 04/25/19 Left Forearm (Active) Site Assessment Clean, dry, & intact 4/29/2019  7:55 AM  
Phlebitis Assessment 0 4/29/2019  7:55 AM  
Infiltration Assessment 0 4/29/2019  7:55 AM  
Dressing Status Clean, dry, & intact 4/29/2019  7:55 AM  
Dressing Type Tape;Transparent 4/29/2019  7:55 AM  
Hub Color/Line Status Infusing 4/29/2019  7:55 AM  
Alcohol Cap Used No 4/26/2019  3:08 PM  
   
Peripheral IV 04/28/19 Anterior;Distal;Right Forearm (Active) Site Assessment Clean, dry, & intact 4/29/2019  7:55 AM  
Phlebitis Assessment 0 4/29/2019  7:55 AM  
Infiltration Assessment 0 4/29/2019  7:55 AM  
Dressing Status Clean, dry, & intact 4/29/2019  7:55 AM  
Dressing Type Tape;Transparent 4/29/2019  7:55 AM  
Hub Color/Line Status Infusing 4/29/2019  7:55 AM  
  
 
Opportunity for questions and clarification was provided.    
 
Patient transported with: 
 Patient-specific medications from Pharmacy PPN

## 2019-04-29 NOTE — PROGRESS NOTES
Pt returned to floor from PACU. VSS. Abdominal wound to midline assessed with second RN, Marissa. Ostomy bag intact over opening of midline incision. 100 ml of brown, output emptied, see flow sheets. Active bowel sounds heard in all four quadrants. Safety measures in place, call light within reach, bed alarm on. Will continue to monitor.

## 2019-04-29 NOTE — PROGRESS NOTES
TRANSFER - IN REPORT: 
 
Verbal report received from Scooby Jeff, [address] [place] [address] (name) on Monique Che  being received from PACU (unit) for routine post - op Report consisted of patients Situation, Background, Assessment and  
Recommendations(SBAR). Information from the following report(s) SBAR, Kardex, Procedure Summary, Intake/Output and Recent Results was reviewed with the receiving nurse. Opportunity for questions and clarification was provided. Assessment completed upon patients arrival to unit and care assumed.

## 2019-04-29 NOTE — PROGRESS NOTES
Ostomy bag over midline wound emptied. 150 ML foul, brown output measured. Will continue to monitor.

## 2019-04-29 NOTE — PROGRESS NOTES
Nutrition follow-up:  
TPN management per nutrition support protocols/ Dr. Cordell Olmstead 4/09 Assessment: DIET NUTRITIONAL SUPPLEMENTS All Meals; Ensure [employer] DIET FULL LIQUID Food/Nutrition History: 
The patient is POD 24- ex lap, lysis of adhesions, small bowel resection, enterotomy repair x3 and repair of cystotomy. Pt had SBFT 4/24 which showed no bowel obstruction. Pt c/o bloating and inability to tolerate po intake. He recalls drinking water and up to one ensure daily following contrast intake last week. He reports consuming the contrast increased his abdominal bloating and reports emesis of contrast which has resulted in him consuming less ensure than prior. Unable to to verify volume of reported emesis last week. Central Line Access: For central line placement today, continues with only peripheral line access beyond pharmacy cut off time for PN. Abdomen: per surgery Wound purulent, fecal drainage from 15mm opening in central portion of otherwise healed midline wound. Ostomy - 200ml recorded 4/27-4/28, pt reports + output at RD visit earlier today Pertinent Medications: NS @75ml/hr. Pertinent Labs: Na 132, Glu 108, Ca 8, PO4 3.2, Mg 1.9. Anthropometrics:Height: 5' 8\" (172.7 cm),  Weight: 58 kg (127 lb 12.8 oz), Weight Source: Bed, Body mass index is 19.43 kg/m². BMI class of underweight for age >71. Last 3 Recorded Weights in this Encounter 04/26/19 1140 04/28/19 0324 04/29/19 4722 Weight: 60.4 kg (133 lb 3.2 oz) 60 kg (132 lb 4.4 oz) 60 kg (132 lb 4.8 oz) Admission weight: 62.69 kg (Bed weight) Macronutrient needs: Based on weight of 60.6 kg: EER:  7924-6997 kcal /day (25-30 kcal/kg listed BW) DKN:  03-16 OHPKV protein/day (1.2-1.5 grams/kg BW) GFR >60 Max CHO:  349 grams/day (4mg/kg BW/min) Fluid: 1ml/kcal 
Intake/Comparative Standards:   
Continues without recorded intake; pt has been accepting bottled water and Ensure Enlive-up to one serving daily per pt. Recall per pt <20% estimated needs.  
PPN: 5%DEX/ 4.25%AA; at 110 ml/hr with 250 ml 20% Lipids daily- provides 1397 kcal/d (~77% of needs), 112 grams of protein/d (>100% of needs), 132 grams of CHO/d (does not exceed maximum CHO load) and 2890 ml of total volume/d ( >100% of needs).   
Intervention: 
Meals and snacks: Diet per MD.   
Nutrition Supplement: Continue Ensure Enlive TID if appropriate with diet order as pt reports consuming portions of each throughout day. Nutritional Supplement Therapy: Electrolyte replacement per nutritional support protocols are active on MAR. PN:  
1. Will continue current PPN secondary lack of central line access. No changes to current formulation. Plan to change to TPN tomorrow once central line access available. 2.  Continue PN labs, POC glucoses/SSI if glucose >180 mg/dl on AM BMP. Add MG and Phos for tomorrow secondary trending down after replacement on Fri and no phos in current PPN formulation. Coordination of Care: Lisset Woods RN, Franny Moraes RN, [facility], Pharmacist.   
Discharge Nutrition Plan: Too soon to determine. [place], [address], [facility]

## 2019-04-29 NOTE — PROGRESS NOTES
Problem: Self Care Deficits Care Plan (Adult) Goal: *Acute Goals and Plan of Care (Insert Text) Description 1. Patient will perform grooming with supervision standing at sink. Progressing 2. Patient will perform Upper body dressing with stand by assist, seated. GOAL MET 4/29/2019 3. Patient will perform lower body dressing with contact guard assistance. 4. Patient will perform upper and lower body bathing with stand by assistance seated on shower chair. Progressing 5. Patient will perform shower transfer with stand by assistance. Progressing 6. Patient will participate in 30 + minutes of ADL/ therapeutic exercise/therapeutic activity with min rest breaks to increase activity tolerance for self care. GOAL MET 4/29/2019 7. Patient will perform ADL functional mobility in room with stand by assistance. Progressing Goals to be achieved in 7 days. Outcome: Progressing Towards Goal 
  
OCCUPATIONAL THERAPY: Initial Assessment, Daily Note and AM 4/29/2019 INPATIENT:   
[place] / [place] / Product Type: Medicare /  
  
NAME/AGE/GENDER: Lavelle Lawson is a 74 y.o. male PRIMARY DIAGNOSIS:  Small bowel obstruction ([address] [K56.609] Small bowel obstruction ([address] Small bowel obstruction ([address] Procedure(s) (LRB): 
EXPLORATORY LAPAROTOMY, LYSIS OF ADHESIONS, SMALL BOWEL RESECTION X1, ENTEROTOMY REPAIR X3, REPAIR OF CYSTOTOMY (N/A) 26 Days Post-Op ICD-10: Treatment Diagnosis:  
 · Generalized Muscle Weakness (M62.81) Precautions/Allergies: 
   Patient has no known allergies. ASSESSMENT:  
Mr. Kori Perkins presents with above diagnosis. Pt was seen in room with RN present. Pt with catheter, colostomy bag, and 2 IV poles. Pt moves fairly well requiring assistance with medical equipment management.  Pt wanting to walk, assisted with community distance ambulation and did well with straight walking but when in room with obstacles and visual scanning required contact guard assistance to minimal assistance due to loss of balance. This deficit makes bathroom mobility and self care more difficult, therefore, pt would benefit from OT to maximize safety and independence with self care and functional mobility. 4/29/2019 Pt seen in room with rehab aid present. RN disconnected one IV for shower but asked that we not get the one looking in his left arm wet. Pt was transferred to shower seated backward to keep right arm out of water. Pt completed shower, bowel needs, grooming and dressing as charted below. Pt needs encouragement to do things for himself but did well and is making progress. Would benefit from continue OT. This section established at most recent assessment PROBLEM LIST (Impairments causing functional limitations): 1. Decreased Strength 2. Decreased ADL/Functional Activities 3. Decreased Transfer Abilities 4. Decreased Balance 5. Decreased Activity Tolerance INTERVENTIONS PLANNED: (Benefits and precautions of occupational therapy have been discussed with the patient.) 1. Activities of daily living training 2. Adaptive equipment training 3. Balance training 4. Therapeutic activity 5. Therapeutic exercise TREATMENT PLAN: Frequency/Duration: Follow patient 3 times per week to address above goals. Rehabilitation Potential For Stated Goals: Good REHAB RECOMMENDATIONS (at time of discharge pending progress):   
Placement: It is my opinion, based on this patient's performance to date, that Mr. Marroquin may benefit from intensive therapy at a [facility] after discharge due to the functional deficits listed above that are likely to improve with skilled rehabilitation and concerns that he/she may be unsafe to be unsupervised at home due to generalized weakness and decreased balance . Equipment:  
? None at this time OCCUPATIONAL PROFILE AND HISTORY:  
History of Present Injury/Illness (Reason for Referral): 
 weakness Past Medical History/Comorbidities:  
Mr. Scott Burciaga  has a past medical history of Family history of malignant neoplasm of gastrointestinal tract, Fecal incontinence, Former cigarette smoker, History of rectal cancer, Hypothyroid, Infection and inflammatory reaction due to other internal prosthetic devices, implants and grafts, subsequent encounter (2017), Insomnia, Osteoarthritis, hand, Personal history of colonic polyps (9/2013), Personal history of malignant neoplasm of rectum, rectosigmoid junction, and anus (9/2013), and Psychiatric disorder. He also has no past medical history of Adverse effect of anesthesia, Aneurysm ([address] Arrhythmia, Asthma, Autoimmune disease ([address] CAD (coronary artery disease), Chronic kidney disease, Chronic obstructive pulmonary disease ([address] Chronic pain, Coagulation disorder ([address] Diabetes ([address] Difficult intubation, Endocarditis, GERD (gastroesophageal reflux disease), Heart failure ([address] Hypertension, Ill-defined condition, Liver disease, Malignant hyperthermia due to anesthesia, Morbid obesity ([address] Nausea & vomiting, Other ill-defined conditions(799.89), Pseudocholinesterase deficiency, PUD (peptic ulcer disease), Rheumatic fever, Seizures ([address] Stroke ([address] Thromboembolus ([address] Unspecified adverse effect of anesthesia, or Unspecified sleep apnea. Mr. Scott Burciaga  has a past surgical history that includes endoscopy, colon, diagnostic (last 2/3/15); hx polypectomy; hx other surgical (10/7/2013); hx colectomy (11/2013); hx colectomy (Right, 8/2014); hx hernia repair (3/2015, 5/2016); hx gi (4/2016); hx colostomy (5/11/16); hx hernia repair; hx other surgical (Bilateral); colonoscopy thru stoma (2/12/2018); colonoscopy (N/A, 2/12/2018); and hx vascular access (Left, 4/14). Social History/Living Environment:  
Home Environment: Other (comment)(townhouse/condo) One/Two Story Residence: Two story Living Alone: Yes Support Systems: Family member(s), Friends \ neighbors Patient Expects to be Discharged to[de-identified] Other (comment)(townhouse/condo) Current DME Used/Available at Home: None Prior Level of Function/Work/Activity: 
Independent Number of Personal Factors/Comorbidities that affect the Plan of Care: Brief history (0):  LOW COMPLEXITY ASSESSMENT OF OCCUPATIONAL PERFORMANCE[de-identified]  
Activities of Daily Living:  
Basic ADLs (From Assessment) Complex ADLs (From Assessment) Feeding: (N/A) Oral Facial Hygiene/Grooming: Stand-by assistance Bathing: Minimum assistance Upper Body Dressing: Minimum assistance Lower Body Dressing: Minimum assistance Toileting: (n/a catheter and colostomy bag) Grooming/Bathing/Dressing Activities of Daily Living Grooming Grooming Assistance: Minimum assistance(with shaving only seated on shower chair) Upper Body Bathing Bathing Assistance: Stand-by assistance(seated on shower chair, one handed as right arm had IV that RN did not want us to get wet) Lower Body Bathing Bathing Assistance: Minimum assistance(feet) Cues: Verbal cues provided Toileting Toileting Assistance: Stand-by assistance(pt able to empty colostomy kneeled at toilet with stand by assistance) Cues: Verbal cues provided Adaptive Equipment: Grab bars Upper Body Dressing Assistance Dressing Assistance: (gown only) Functional Transfers Bathroom Mobility: Contact guard assistance Toilet Transfer : Contact guard assistance Shower Transfer: Contact guard assistance Cues: Verbal cues provided Adaptive Equipment: Shower chair with back;Grab bars Lower Body Dressing Assistance Dressing Assistance: (gown only) Bed/Mat Mobility Supine to Sit: Stand-by assistance Sit to Supine: Stand-by assistance Sit to Stand: Contact guard assistance Stand to Sit: Contact guard assistance Scooting: Additional time;Stand-by assistance Most Recent Physical Functioning:  
Gross Assessment: 
  
         
  
Posture: 
Posture (WDL): Exceptions to [facility] Posture Assessment: Forward head, Rounded shoulders Balance: 
Sitting: Intact Standing: Intact Standing - Static: Good Standing - Dynamic : Fair;Occassional Bed Mobility: 
Supine to Sit: Stand-by assistance Sit to Supine: Stand-by assistance Scooting: Additional time;Stand-by assistance Wheelchair Mobility: 
  
Transfers: 
Sit to Stand: Contact guard assistance Stand to Sit: Contact guard assistance Patient Vitals for the past 6 hrs: 
 BP BP Patient Position SpO2 Pulse 04/29/19 1132 121/62 At rest;Head of bed elevated (Comment degrees) 95 % 60  
04/29/19 1520 137/76 At rest;Head of bed elevated (Comment degrees) 96 % 100 Mental Status Neurologic State: Alert Orientation Level: Oriented to person, Oriented to place, Oriented to time(disoriented to some details of his situation) Cognition: Follows commands Perception: Appears intact Perseveration: No perseveration noted Safety/Judgement: Awareness of environment, Fall prevention Physical Skills Involved: 
1. Balance 2. Strength 3. Activity Tolerance Cognitive Skills Affected (resulting in the inability to perform in a timely and safe manner): 1. Executive Function Psychosocial Skills Affected: 1. Environmental Adaptation Number of elements that affect the Plan of Care: 3-5:  MODERATE COMPLEXITY CLINICAL DECISION MAKING:  
[employer] AM-PAC 6 Clicks Daily Activity Inpatient Short Form How much help from another person does the patient currently need. .. Total A Lot A Little None 1. Putting on and taking off regular lower body clothing? ? 1   ? 2   ? 3   ? 4  
2. Bathing (including washing, rinsing, drying)? ? 1   ? 2   ? 3   ? 4  
3. Toileting, which includes using toilet, bedpan or urinal?   ? 1   ? 2   ? 3   ? 4  
4. Putting on and taking off regular upper body clothing? ? 1   ? 2   ? 3   ? 4  
5. Taking care of personal grooming such as brushing teeth?    ? 1   ? 2 ? 3   ? 4  
6. Eating meals? ? 1   ? 2   ? 3   ? 4  
© 2007, Trustees of [address] [place] [address] [address] [address], under license to [employer]. All rights reserved Score:  Initial: 19 Most Recent: X (Date: -- ) Interpretation of Tool:  Represents activities that are increasingly more difficult (i.e. Bed mobility, Transfers, Gait). Medical Necessity:    
· Patient is expected to demonstrate progress in strength, balance, coordination and functional technique ·  to increase independence with self care · . Reason for Services/Other Comments: 
· pt would benefit from OT to maximize safety and independence with self care and functional mobility. Use of outcome tool(s) and clinical judgement create a POC that gives a: LOW COMPLEXITY  
 
 
 
TREATMENT:  
(In addition to Assessment/Re-Assessment sessions the following treatments were rendered) Pre-treatment Symptoms/Complaints:  No complaint of pain during self care Pain: Initial:  
Pain Intensity 1: 0 0 Post Session:  0 Self Care: (70min): Procedure(s) (per grid) utilized to improve and/or restore self-care/home management as related to dressing, bathing, toileting and grooming. Required minimal verbal and   cueing to facilitate activities of daily living skills and compensatory activities. Braces/Orthotics/Lines/Etc:  
· IV 
· brito catheter · Colostomy · O2 Device: Room air Treatment/Session Assessment:   
· Response to Treatment:  pt tolerated well but was certainly tired after the long ambulation · Interdisciplinary Collaboration:  
o Occupational Therapist 
o Registered Nurse 
o  
o Rehabilitation Attendant · After treatment position/precautions:  
o Supine in bed 
o Bed in low position 
o Call light within reach 
o RN notified · Compliance with Program/Exercises: Compliant all of the time, Will assess as treatment progresses. · Recommendations/Intent for next treatment session:   \"Next visit will focus on advancements to more challenging activities and reduction in assistance provided\". Total Treatment Duration: OT Patient Time In/Time Out Time In: 0900 Time Out: 1010 Keisha Corbett, OT

## 2019-04-29 NOTE — PROGRESS NOTES
Pt transported off floor via bed in stable condition to preop. PPN infusing. Central line kit and 3 end caps with pt chart.

## 2019-04-29 NOTE — PROGRESS NOTES
Problem: Falls - Risk of 
Goal: *Absence of Falls Description Document Gabrielamisael Noelata Fall Risk and appropriate interventions in the flowsheet. Outcome: Progressing Towards Goal 
  
Problem: Pressure Injury - Risk of 
Goal: *Prevention of pressure injury Description Document Josef Scale and appropriate interventions in the flowsheet.  
Outcome: Progressing Towards Goal

## 2019-04-29 NOTE — PROGRESS NOTES
Pt complains of neck pain 8/10. PRN Dilaudid 1 mg given slow, IVP per MD order. Safety measures in place, call light within reach. Will continue to monitor.

## 2019-04-30 LAB
ANION GAP SERPL CALC-SCNC: 7 MMOL/L
BUN SERPL-MCNC: 17 MG/DL (ref 8–23)
CALCIUM SERPL-MCNC: 7.9 MG/DL (ref 8.3–10.4)
CHLORIDE SERPL-SCNC: 97 MMOL/L (ref 98–107)
CO2 SERPL-SCNC: 27 MMOL/L (ref 21–32)
CREAT SERPL-MCNC: 0.51 MG/DL (ref 0.8–1.5)
GLUCOSE SERPL-MCNC: 128 MG/DL (ref 65–100)
MAGNESIUM SERPL-MCNC: 1.9 MG/DL (ref 1.8–2.4)
PHOSPHATE SERPL-MCNC: 3.1 MG/DL (ref 2.3–3.7)
POTASSIUM SERPL-SCNC: 3.9 MMOL/L (ref 3.5–5.1)
SODIUM SERPL-SCNC: 131 MMOL/L (ref 136–145)

## 2019-04-30 PROCEDURE — 83735 ASSAY OF MAGNESIUM: CPT

## 2019-04-30 PROCEDURE — 74011000258 HC RX REV CODE- 258: Performed by: SURGERY

## 2019-04-30 PROCEDURE — 74011250637 HC RX REV CODE- 250/637: Performed by: SURGERY

## 2019-04-30 PROCEDURE — 74011250636 HC RX REV CODE- 250/636: Performed by: SURGERY

## 2019-04-30 PROCEDURE — 74011000250 HC RX REV CODE- 250: Performed by: SURGERY

## 2019-04-30 PROCEDURE — 74011250636 HC RX REV CODE- 250/636: Performed by: NURSE PRACTITIONER

## 2019-04-30 PROCEDURE — 3E0436Z INTRODUCTION OF NUTRITIONAL SUBSTANCE INTO CENTRAL VEIN, PERCUTANEOUS APPROACH: ICD-10-PCS | Performed by: SURGERY

## 2019-04-30 PROCEDURE — C9113 INJ PANTOPRAZOLE SODIUM, VIA: HCPCS | Performed by: SURGERY

## 2019-04-30 PROCEDURE — 65270000029 HC RM PRIVATE

## 2019-04-30 PROCEDURE — 80048 BASIC METABOLIC PNL TOTAL CA: CPT

## 2019-04-30 PROCEDURE — 84100 ASSAY OF PHOSPHORUS: CPT

## 2019-04-30 PROCEDURE — 77030020263 HC SOL INJ SOD CL0.9% LFCR 1000ML

## 2019-04-30 RX ADMIN — HYOSCYAMINE SULFATE 0.12 MG: 0.12 TABLET ORAL at 06:11

## 2019-04-30 RX ADMIN — Medication 1 AMPULE: at 22:26

## 2019-04-30 RX ADMIN — HYOSCYAMINE SULFATE 0.12 MG: 0.12 TABLET ORAL at 22:25

## 2019-04-30 RX ADMIN — TAMSULOSIN HYDROCHLORIDE 0.4 MG: 0.4 CAPSULE ORAL at 07:45

## 2019-04-30 RX ADMIN — SODIUM CHLORIDE 75 ML/HR: 900 INJECTION, SOLUTION INTRAVENOUS at 17:21

## 2019-04-30 RX ADMIN — POTASSIUM CHLORIDE: 2 INJECTION, SOLUTION, CONCENTRATE INTRAVENOUS at 17:38

## 2019-04-30 RX ADMIN — RIFAXIMIN 200 MG: 200 TABLET ORAL at 16:00

## 2019-04-30 RX ADMIN — RIFAXIMIN 200 MG: 200 TABLET ORAL at 22:25

## 2019-04-30 RX ADMIN — LEVOTHYROXINE SODIUM 125 MCG: 125 TABLET ORAL at 06:11

## 2019-04-30 RX ADMIN — CLONAZEPAM 4 MG: 1 TABLET ORAL at 22:25

## 2019-04-30 RX ADMIN — Medication 1 AMPULE: at 07:44

## 2019-04-30 RX ADMIN — ZOLPIDEM TARTRATE 5 MG: 5 TABLET ORAL at 02:00

## 2019-04-30 RX ADMIN — AMITRIPTYLINE HYDROCHLORIDE 100 MG: 50 TABLET, FILM COATED ORAL at 22:26

## 2019-04-30 RX ADMIN — PANTOPRAZOLE SODIUM 40 MG: 40 INJECTION, POWDER, FOR SOLUTION INTRAVENOUS at 07:44

## 2019-04-30 RX ADMIN — HYOSCYAMINE SULFATE 0.12 MG: 0.12 TABLET ORAL at 11:38

## 2019-04-30 RX ADMIN — VITAMIN D, TAB 1000IU (100/BT) 2000 UNITS: 25 TAB at 17:22

## 2019-04-30 RX ADMIN — VITAMIN D, TAB 1000IU (100/BT) 2000 UNITS: 25 TAB at 07:45

## 2019-04-30 RX ADMIN — RIFAXIMIN 200 MG: 200 TABLET ORAL at 07:45

## 2019-04-30 RX ADMIN — OLANZAPINE 5 MG: 2.5 TABLET, FILM COATED ORAL at 02:01

## 2019-04-30 RX ADMIN — OLANZAPINE 2.5 MG: 2.5 TABLET, FILM COATED ORAL at 22:26

## 2019-04-30 RX ADMIN — ACETAMINOPHEN 650 MG: 325 TABLET, FILM COATED ORAL at 17:22

## 2019-04-30 RX ADMIN — I.V. FAT EMULSION 250 ML: 20 EMULSION INTRAVENOUS at 17:44

## 2019-04-30 RX ADMIN — HYOSCYAMINE SULFATE 0.12 MG: 0.12 TABLET ORAL at 17:22

## 2019-04-30 NOTE — PROGRESS NOTES
Shift assessment completed via doc flow sheet. Pt A & O x 4. Lungs CTA, HR WNL, NSR. Abdomen soft and non tender. Both ostomy pouches emptied. No c/o pain at this time. IVS c/d/i, no s/sx of infection/infiltration noted. Pt able to make needs known. No concerns at this time. Bed low and locked. Call light within reach. Will continue to monitor.

## 2019-04-30 NOTE — PROGRESS NOTES
Problem: Falls - Risk of 
Goal: *Absence of Falls Description Document Nuno Sherman Fall Risk and appropriate interventions in the flowsheet. Outcome: Progressing Towards Goal 
  
Problem: Pressure Injury - Risk of 
Goal: *Prevention of pressure injury Description Document Josef Scale and appropriate interventions in the flowsheet. Outcome: Progressing Towards Goal 
  
Problem: Patient Education: Go to Patient Education Activity Goal: Patient/Family Education Outcome: Progressing Towards Goal 
  
Problem: Nutrition Deficit Goal: *Optimize nutritional status Outcome: Progressing Towards Goal 
  
Problem: Patient Education: Go to Patient Education Activity Goal: Patient/Family Education Outcome: Progressing Towards Goal

## 2019-04-30 NOTE — PROGRESS NOTES
No new issues. Appropriately despondent- (on top of his baseline down demeanor) Visit Vitals /70 (BP 1 Location: Left arm, BP Patient Position: At rest;Supine) Pulse 97 Temp (!) 101 °F (38.3 °C) Resp 18 Ht 5' 8\" (1.727 m) Wt 132 lb 4.8 oz (60 kg) SpO2 91% BMI 20.12 kg/m²  
  
abd-soft, nondistended Copious liquid stool in fistula pouch, minimal per colostomy Labs reviewed Plan- To start TPN tonight Will investigate fistula anatomy with fistulagram when output slows Will try to remove brito, again, tomorrow

## 2019-04-30 NOTE — PROGRESS NOTES
Assessment done via doc flow sheet. Pt resting quietly in bed, alert & oriented x4, resp easy & regular, lungs CTAB. Abdomen soft, midline incision fistula with ostomy bag with copious amount brownish liquid drainage. Emptied 500 ml. Left ostomy intact. Laceration to left side of forehead healed and no drainage noted. Patient denies pain at this time. Call light within reach, pt instructed to call for assistance as needed. Will monitor.

## 2019-04-30 NOTE — PROGRESS NOTES
Care Management Interventions PCP Verified by CM: Yes Transition of Care Consult (CM Consult): Discharge Planning Current Support Network: Own Home Confirm Follow Up Transport: Other (see comment) Plan discussed with Pt/Family/Caregiver: Yes Freedom of Choice Offered: Yes Discharge Location Discharge Placement: Skilled nursing facility SW spoke with Rashel Humphreys & provided update, unsure of d/c date.

## 2019-04-30 NOTE — PROGRESS NOTES
Nutrition follow-up:  
TPN management per nutrition support protocols/ Dr. Saintclair Collard 4/09 Assessment: DIET NUTRITIONAL SUPPLEMENTS All Meals; Ensure [employer] DIET FULL LIQUID Food/Nutrition History: 
The patient is POD 27- ex lap, lysis of adhesions, small bowel resection, enterotomy repair x3 and repair of cystotomy. Pt had SBFT 4/24 which showed no bowel obstruction. Ostomy bag placed over EC fistula yesterday. Pt asleep times two attempts to visit today, he has continued with minimal po intake per nursing. Transitioning to TPN tonight which will decrease total fluid provided as PPN currently provides ~2.6 liters and TPN will provide ~1.9 liters. Central Line Access:   Right IJ Abdomen:  fecal drainage from 15mm opening in central portion of otherwise healed midline wound now with ostomy pouch. 100ml recorded out yesterday, 500ml thus far today. Ostomy - nursing reports continued output in colostomy ~1/3 full at RD visit, output has not been recorded since 4/28. Pertinent Medications: NS @75ml/hr. Pertinent Labs: Na 131 (continues to trend down despite maximum NaCl in PPN and addition of NS 4/24/25. Cl 97, Glu 128, Ca 7.9, PO4 3.1, Mg 1.9. Anthropometrics:Height: 5' 8\" (172.7 cm),  Weight: 58 kg (127 lb 12.8 oz), Weight Source: Bed, Body mass index is 19.43 kg/m². BMI class of underweight for age >71. Last 3 Recorded Weights in this Encounter 04/26/19 1140 04/28/19 0324 04/29/19 7210 Weight: 60.4 kg (133 lb 3.2 oz) 60 kg (132 lb 4.4 oz) 60 kg (132 lb 4.8 oz) Most recent weight does not specify source of weight, presumed bed weight. Admission weight: 62.69 kg (Bed weight) Macronutrient needs: Based on weight of 60.6 kg 
EER:  1514-5707 kcal /day (25-30 kcal/kg listed BW) CWY:  26-65 ZXBGE protein/day (1.2-1.5 grams/kg BW) GFR >60 Max CHO:  349 grams/day (4mg/kg BW/min) Fluid: 1ml/kcal 
Intake/Comparative Standards:   
 Continues without recorded intake; pt has been accepting bottled water and Ensure Enlive-up to one serving daily per pt recall yesterday. Recall per pt <20% estimated needs.  
PPN: 5%DEX/ 4.25%AA; at 110 ml/hr with 250 ml 20% Lipids daily- provides 1397 kcal/d (~77% of needs), 112 grams of protein/d (>100% of needs), 132 grams of CHO/d (does not exceed maximum CHO load) and 2890 ml of total volume/d ( >100% of needs).  Currently receiving daily: 158 mEq NaCl, 79 mEq KCl, 21 mEq Mg. Intervention: 
Meals and snacks: Diet progression per MD.    
Nutrition Supplement: Continue Ensure Enlive TID. Nutritional Supplement Therapy: Electrolyte replacement per nutritional support protocols are active on MAR. PN: 1. Discontinue current PPN at 1759.   
2.  Start TPN tonight at 1800: 15%DEX/ 5%AA at 80 ml/hr with 250 ml 20% Lipids over 12 hours daily to provide 1778 kcal/d (100% of needs), 90 grams of protein/d (100% of needs), 270 grams of CHO/d (does not exceed maximum CHO load) and ~1.9 ml of total volume/d (1.1 ml/kcal). 3.  TPN additives: 100 mEq/L NaCl, 20 mEq/L KCl, 25 mEq/L KPhos, 4.5 mEq/L Ca Gluconate, 8 mEq/L Mg Sulfate, MVI and trace elements. 3.  IVF will continue per surgery providing additional NaCL and 1.8 liters fluid per day. Continue PN labs, POC glucoses/SSI if glucose >180 mg/dl on AM BMP. Coordination of Care: Alma Rosa Dailey, Pharmacist, Care Management. Discharge Nutrition Plan: Too soon to determine. [place], [address], [place] [address]

## 2019-05-01 ENCOUNTER — APPOINTMENT (OUTPATIENT)
Dept: GENERAL RADIOLOGY | Age: 74
DRG: 330 | End: 2019-05-01
Attending: SURGERY
Payer: MEDICARE

## 2019-05-01 LAB
ANION GAP SERPL CALC-SCNC: 8 MMOL/L
BUN SERPL-MCNC: 17 MG/DL (ref 8–23)
CALCIUM SERPL-MCNC: 8 MG/DL (ref 8.3–10.4)
CHLORIDE SERPL-SCNC: 101 MMOL/L (ref 98–107)
CO2 SERPL-SCNC: 26 MMOL/L (ref 21–32)
CREAT SERPL-MCNC: 0.56 MG/DL (ref 0.8–1.5)
GLUCOSE SERPL-MCNC: 130 MG/DL (ref 65–100)
MAGNESIUM SERPL-MCNC: 2 MG/DL (ref 1.8–2.4)
PHOSPHATE SERPL-MCNC: 3.6 MG/DL (ref 2.3–3.7)
POTASSIUM SERPL-SCNC: 3.7 MMOL/L (ref 3.5–5.1)
SODIUM SERPL-SCNC: 135 MMOL/L (ref 136–145)

## 2019-05-01 PROCEDURE — 74011250637 HC RX REV CODE- 250/637: Performed by: SURGERY

## 2019-05-01 PROCEDURE — 84100 ASSAY OF PHOSPHORUS: CPT

## 2019-05-01 PROCEDURE — 83735 ASSAY OF MAGNESIUM: CPT

## 2019-05-01 PROCEDURE — 74011250636 HC RX REV CODE- 250/636: Performed by: SURGERY

## 2019-05-01 PROCEDURE — 74011000258 HC RX REV CODE- 258: Performed by: SURGERY

## 2019-05-01 PROCEDURE — 77030011943

## 2019-05-01 PROCEDURE — BW110ZZ FLUOROSCOPY OF ABDOMEN AND PELVIS USING HIGH OSMOLAR CONTRAST: ICD-10-PCS | Performed by: RADIOLOGY

## 2019-05-01 PROCEDURE — C9113 INJ PANTOPRAZOLE SODIUM, VIA: HCPCS | Performed by: SURGERY

## 2019-05-01 PROCEDURE — 65270000029 HC RM PRIVATE

## 2019-05-01 PROCEDURE — 77030020263 HC SOL INJ SOD CL0.9% LFCR 1000ML

## 2019-05-01 PROCEDURE — 74011000250 HC RX REV CODE- 250: Performed by: SURGERY

## 2019-05-01 PROCEDURE — 74011250636 HC RX REV CODE- 250/636: Performed by: NURSE PRACTITIONER

## 2019-05-01 PROCEDURE — 76080 X-RAY EXAM OF FISTULA: CPT

## 2019-05-01 PROCEDURE — 74011636320 HC RX REV CODE- 636/320: Performed by: SURGERY

## 2019-05-01 PROCEDURE — 80048 BASIC METABOLIC PNL TOTAL CA: CPT

## 2019-05-01 RX ADMIN — RIFAXIMIN 200 MG: 200 TABLET ORAL at 17:29

## 2019-05-01 RX ADMIN — I.V. FAT EMULSION 250 ML: 20 EMULSION INTRAVENOUS at 17:17

## 2019-05-01 RX ADMIN — TAMSULOSIN HYDROCHLORIDE 0.4 MG: 0.4 CAPSULE ORAL at 09:08

## 2019-05-01 RX ADMIN — HYOSCYAMINE SULFATE 0.12 MG: 0.12 TABLET ORAL at 12:16

## 2019-05-01 RX ADMIN — HYOSCYAMINE SULFATE 0.12 MG: 0.12 TABLET ORAL at 23:19

## 2019-05-01 RX ADMIN — VITAMIN D, TAB 1000IU (100/BT) 2000 UNITS: 25 TAB at 09:08

## 2019-05-01 RX ADMIN — HYOSCYAMINE SULFATE 0.12 MG: 0.12 TABLET ORAL at 05:48

## 2019-05-01 RX ADMIN — SODIUM CHLORIDE 75 ML/HR: 900 INJECTION, SOLUTION INTRAVENOUS at 20:08

## 2019-05-01 RX ADMIN — RIFAXIMIN 200 MG: 200 TABLET ORAL at 22:55

## 2019-05-01 RX ADMIN — ZOLPIDEM TARTRATE 5 MG: 5 TABLET ORAL at 02:30

## 2019-05-01 RX ADMIN — HYOSCYAMINE SULFATE 0.12 MG: 0.12 TABLET ORAL at 17:29

## 2019-05-01 RX ADMIN — LEVOTHYROXINE SODIUM 125 MCG: 125 TABLET ORAL at 05:48

## 2019-05-01 RX ADMIN — Medication 1 AMPULE: at 09:07

## 2019-05-01 RX ADMIN — RIFAXIMIN 200 MG: 200 TABLET ORAL at 09:08

## 2019-05-01 RX ADMIN — SODIUM CHLORIDE 75 ML/HR: 900 INJECTION, SOLUTION INTRAVENOUS at 04:22

## 2019-05-01 RX ADMIN — CLONAZEPAM 4 MG: 1 TABLET ORAL at 22:54

## 2019-05-01 RX ADMIN — DIATRIZOATE MEGLUMINE AND DIATRIZOATE SODIUM 20 ML: 660; 100 LIQUID ORAL; RECTAL at 16:09

## 2019-05-01 RX ADMIN — Medication 1 AMPULE: at 20:13

## 2019-05-01 RX ADMIN — VITAMIN D, TAB 1000IU (100/BT) 2000 UNITS: 25 TAB at 17:29

## 2019-05-01 RX ADMIN — POTASSIUM CHLORIDE: 2 INJECTION, SOLUTION, CONCENTRATE INTRAVENOUS at 17:06

## 2019-05-01 RX ADMIN — AMITRIPTYLINE HYDROCHLORIDE 100 MG: 50 TABLET, FILM COATED ORAL at 22:55

## 2019-05-01 RX ADMIN — OLANZAPINE 2.5 MG: 2.5 TABLET, FILM COATED ORAL at 22:54

## 2019-05-01 RX ADMIN — OLANZAPINE 5 MG: 2.5 TABLET, FILM COATED ORAL at 02:30

## 2019-05-01 RX ADMIN — PANTOPRAZOLE SODIUM 40 MG: 40 INJECTION, POWDER, FOR SOLUTION INTRAVENOUS at 09:08

## 2019-05-01 NOTE — PROGRESS NOTES
Shift assessment complete. Patient alert and oriented person, place, and time. Respirations present, even and unlabored. Small amount of crackles noted on inspiration and sounds are diminished at the bases. S1 & S2 auscultated. Heart with regular rate and rhythm. Abdomen soft and tender, bowel sounds active in all 4 quadrants. Midline incision with moderate amount of liquid stool draining from incision line. Ileostomy with minimal amount of tan output. Stoma is dusky and somewhat retracted. Patient's skin assessment shows skin to be otherwise intact. Patient is up to the bedside chair with maximum assistance. Patient with triple lumen IJ. All lumens flush easily. Two in use with TPN and primary fluids. Patient denies any pain at this time and instructed to call for assistance. Patient verbalizes understanding. The bed is low and locked in position, side rails x3, call light within reach. Will continue to follow patient's progression through hourly rounding. All patient's needs will be met as requested and as appropriate.

## 2019-05-01 NOTE — PROGRESS NOTES
Problem: Falls - Risk of 
Goal: *Absence of Falls Description Document Adriano Rankin Fall Risk and appropriate interventions in the flowsheet. Outcome: Progressing Towards Goal 
  
Problem: Pressure Injury - Risk of 
Goal: *Prevention of pressure injury Description Document Josef Scale and appropriate interventions in the flowsheet. Outcome: Progressing Towards Goal 
  
Problem: Patient Education: Go to Patient Education Activity Goal: Patient/Family Education Outcome: Progressing Towards Goal 
  
Problem: Major Small and Large Bowel Procedure: Post-Op Day 1 Goal: Off Pathway (Use only if patient is Off Pathway) Outcome: Progressing Towards Goal 
Goal: Activity/Safety Outcome: Progressing Towards Goal 
Goal: Consults, if ordered Outcome: Progressing Towards Goal 
Goal: Diagnostic Test/Procedures Outcome: Progressing Towards Goal 
Goal: Nutrition/Diet Outcome: Progressing Towards Goal 
Goal: Discharge Planning Outcome: Progressing Towards Goal 
Goal: Medications Outcome: Progressing Towards Goal 
Goal: Respiratory Outcome: Progressing Towards Goal 
Goal: Treatments/Interventions/Procedures Outcome: Progressing Towards Goal 
Goal: Psychosocial 
Outcome: Progressing Towards Goal 
Goal: *Optimal pain control at patient's stated goal 
Outcome: Progressing Towards Goal 
Goal: *Adequate urinary output (equal to or greater than 30 milliliters/hour) Outcome: Progressing Towards Goal 
Goal: *Hemodynamically stable Outcome: Progressing Towards Goal 
Goal: *Tolerating diet Outcome: Progressing Towards Goal 
Goal: *Demonstrates progressive activity Outcome: Progressing Towards Goal 
Goal: *Lungs clear or at baseline Outcome: Progressing Towards Goal 
  
Problem: Nutrition Deficit Goal: *Optimize nutritional status Outcome: Progressing Towards Goal 
  
Problem: Patient Education: Go to Patient Education Activity Goal: Patient/Family Education Outcome: Progressing Towards Goal 
  
 Problem: Patient Education: Go to Patient Education Activity Goal: Patient/Family Education Outcome: Progressing Towards Goal

## 2019-05-01 NOTE — PROGRESS NOTES
Admit Date: 3/30/2019 POD 28 Days Post-Op Procedure:  Procedure(s): EXPLORATORY LAPAROTOMY, LYSIS OF ADHESIONS, SMALL BOWEL RESECTION X1, ENTEROTOMY REPAIR X3, REPAIR OF CYSTOTOMY Subjective:  
 
Patient has no new complaints. He denies N/V or pain. Tmax 101, VSS, on room air. 2.7L UOp. Stool output 600mL. K+ 3.7. On TPN and tolerating FLD. Objective:  
 
Blood pressure 108/59, pulse 95, temperature 99 °F (37.2 °C), resp. rate 18, height 5' 8\" (1.727 m), weight 131 lb 12.8 oz (59.8 kg), SpO2 98 %. Temp (24hrs), Av.3 °F (37.4 °C), Min:98.7 °F (37.1 °C), Max:100.6 °F (38.1 °C) Physical Exam:  GENERAL: alert, cooperative, appears stated age, depressed mood, LUNG: clear to auscultation bilaterally, HEART: regular rate and rhythm, S1, S2 normal, no murmur, click, rub or gallop, ABDOMEN: soft, non tender, non distended.  + BS. Fistula with liquid stool output; colostomy with a small amount of liquid output. Labs:  
Recent Results (from the past 24 hour(s)) MAGNESIUM Collection Time: 19  5:52 AM  
Result Value Ref Range Magnesium 2.0 1.8 - 2.4 mg/dL PHOSPHORUS Collection Time: 19  5:52 AM  
Result Value Ref Range Phosphorus 3.6 2.3 - 3.7 MG/DL  
METABOLIC PANEL, BASIC Collection Time: 19  5:52 AM  
Result Value Ref Range Sodium 135 (L) 136 - 145 mmol/L Potassium 3.7 3.5 - 5.1 mmol/L Chloride 101 98 - 107 mmol/L  
 CO2 26 21 - 32 mmol/L Anion gap 8 mmol/L Glucose 130 (H) 65 - 100 mg/dL BUN 17 8 - 23 MG/DL Creatinine 0.56 (L) 0.8 - 1.5 MG/DL  
 GFR est AA >60 >60 ml/min/1.73m2 GFR est non-AA >60 ml/min/1.73m2 Calcium 8.0 (L) 8.3 - 10.4 MG/DL Data Review reviewed  Nursing documentation, I & O and labs Assessment:  
 
Principal Problem: 
  Small bowel obstruction ([address] (3/30/2019) Active Problems: 
  Enterocutaneous fistula (2019) Plan/Recommendations/Medical Decision Making: Continue present treatment Continue TPN Continue FLD Will investigate fistula anatomy with fistulagram when output slows Remove brito today BARBRA Wilkins

## 2019-05-01 NOTE — PROGRESS NOTES
Received call from Radiology. Dr. Sivan Saravia would like to speak to Dr. Pankaj Bradshaw concerning the order for the Sinus series he ordered. Called Dr. Pankaj Bradshaw' office and spoke with Jase Burgos and relayed the information including the phone number for Dr. Sivan Saravia.

## 2019-05-01 NOTE — PROGRESS NOTES
Nutrition follow-up:  
TPN management per nutrition support protocols/ Dr. Sandeep Victor 4/09 Assessment: DIET NUTRITIONAL SUPPLEMENTS All Meals; Ensure [employer] DIET FULL LIQUID 
TPN ADULT-CENTRAL - dextrose 15% amino acid 5% Food/Nutrition History: 
The patient is POD 28- ex lap, lysis of adhesions, small bowel resection, enterotomy repair x3 and repair of cystotomy. Pt had SBFT 4/24 which showed no bowel obstruction. 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 Plan for fistulagram once output slows. Flat affect this am.  C/o \"being a skeleton of a person. \"  Declines having blinds open or lights on in room. Reports he is drinking less water and ensure at this time secondary to [facility] output. Central Line Access:   Right IJ Abdomen: EC fistula ostomy - 1100ml output, colostomy 600ml. Pertinent Medications: NS @75ml/hr, D3. [place] Pertinent Labs: Na 135 (improving with TPN and continued NS). K 3.7 (downward trend) Cl 101, Glu 130, Ca 8, PO4 3.6 (increase with inclusion in TPN), Mg 2. Anthropometrics:Height: 5' 8\" (172.7 cm),  Weight: 58 kg (127 lb 12.8 oz), Weight Source: Bed, Body mass index is 19.43 kg/m². BMI class of underweight for age >71. Last 3 Recorded Weights in this Encounter 04/28/19 1866 04/29/19 0617 05/01/19 0422 Weight: 60 kg (132 lb 4.4 oz) 60 kg (132 lb 4.8 oz) 59.8 kg (131 lb 12.8 oz) Most recent weight does not specify source of weight, presumed bed weight. Trace edema RLE. Admission weight: 62.69 kg (Bed weight) Macronutrient needs: Based on weight of 60.6 kg 
EER:  1997-7593 kcal /day (25-30 kcal/kg listed BW) JJU:  02-25 LAMJT protein/day (1.2-1.5 grams/kg BW) GFR >60 Max CHO:  349 grams/day (4mg/kg BW/min) Fluid: 1ml/kcal 
Intake/Comparative Standards:   
Oral: Continues without recorded intake; Recall per pt for yesterday <10% estimated needs.    
TPN: 15%DEX/ 5%AA at 80 ml/hr with 250 ml 20% Lipids over 12 hours daily provides 1778 kcal/d (100% of needs), 90 grams of protein/d (100% of needs), 270 grams of CHO/d (does not exceed maximum CHO load) and ~1.9 ml of total volume/d (1.1 ml/kcal). IVF: Providing an additional ~1.8 liters of fluid. Total fluid provided daily intravenously ~3.7 liters with pt reporting oral consumption of 20-30 ounces. Intervention: 
Meals and snacks: Diet progression per MD as medically appropriate. Nutrition Supplement: Ensure Enlive as patient requests with diet order. Nutritional Supplement Therapy: Electrolyte replacement per nutritional support protocols are active on MAR. TPN:  
1. Continue current TPN. 15% dextrose, 5% amino acids @ 80ml/hr. 2.  TPN additives: 100 mEq/L NaCl, 30 mEq/L KCl, 20 mEq/L KPhos, 4.5 mEq/L Ca Gluconate, 8 mEq/L Mg Sulfate, MVI and trace elements. 3.  IVF will continue per surgery providing additional NaCL and 1.8 liters fluid per day. Question decrease as EC output decreases. Continue PN labs, POC glucoses/SSI if glucose >180 mg/dl on AM BMP. Coordination of Care: Julee Jones RN, Ricky Coe, Pharmacist.  
Discharge Nutrition Plan: Anticipate pt to require TPN for primary needs. [place], [address], [place] [address]

## 2019-05-02 ENCOUNTER — PATIENT OUTREACH (OUTPATIENT)
Dept: CASE MANAGEMENT | Age: 74
End: 2019-05-02

## 2019-05-02 LAB
ANION GAP SERPL CALC-SCNC: 7 MMOL/L
BUN SERPL-MCNC: 18 MG/DL (ref 8–23)
CALCIUM SERPL-MCNC: 8.4 MG/DL (ref 8.3–10.4)
CHLORIDE SERPL-SCNC: 103 MMOL/L (ref 98–107)
CO2 SERPL-SCNC: 27 MMOL/L (ref 21–32)
CREAT SERPL-MCNC: 0.6 MG/DL (ref 0.8–1.5)
GLUCOSE SERPL-MCNC: 129 MG/DL (ref 65–100)
POTASSIUM SERPL-SCNC: 4.2 MMOL/L (ref 3.5–5.1)
PSA SERPL-MCNC: 9.7 NG/ML
SODIUM SERPL-SCNC: 137 MMOL/L (ref 136–145)

## 2019-05-02 PROCEDURE — 80048 BASIC METABOLIC PNL TOTAL CA: CPT

## 2019-05-02 PROCEDURE — 74011250637 HC RX REV CODE- 250/637: Performed by: SURGERY

## 2019-05-02 PROCEDURE — 65270000029 HC RM PRIVATE

## 2019-05-02 PROCEDURE — 74011250636 HC RX REV CODE- 250/636: Performed by: NURSE PRACTITIONER

## 2019-05-02 PROCEDURE — 74011250636 HC RX REV CODE- 250/636: Performed by: SURGERY

## 2019-05-02 PROCEDURE — 84153 ASSAY OF PSA TOTAL: CPT

## 2019-05-02 PROCEDURE — 51798 US URINE CAPACITY MEASURE: CPT

## 2019-05-02 PROCEDURE — 74011000258 HC RX REV CODE- 258: Performed by: SURGERY

## 2019-05-02 PROCEDURE — 74011000250 HC RX REV CODE- 250: Performed by: SURGERY

## 2019-05-02 PROCEDURE — 77030020263 HC SOL INJ SOD CL0.9% LFCR 1000ML

## 2019-05-02 PROCEDURE — 77030034849

## 2019-05-02 PROCEDURE — 36415 COLL VENOUS BLD VENIPUNCTURE: CPT

## 2019-05-02 PROCEDURE — 97535 SELF CARE MNGMENT TRAINING: CPT

## 2019-05-02 PROCEDURE — C9113 INJ PANTOPRAZOLE SODIUM, VIA: HCPCS | Performed by: SURGERY

## 2019-05-02 RX ADMIN — HYOSCYAMINE SULFATE 0.12 MG: 0.12 TABLET ORAL at 05:46

## 2019-05-02 RX ADMIN — RIFAXIMIN 200 MG: 200 TABLET ORAL at 22:39

## 2019-05-02 RX ADMIN — HYOSCYAMINE SULFATE 0.12 MG: 0.12 TABLET ORAL at 13:24

## 2019-05-02 RX ADMIN — TAMSULOSIN HYDROCHLORIDE 0.4 MG: 0.4 CAPSULE ORAL at 09:39

## 2019-05-02 RX ADMIN — HYOSCYAMINE SULFATE 0.12 MG: 0.12 TABLET ORAL at 17:32

## 2019-05-02 RX ADMIN — I.V. FAT EMULSION 250 ML: 20 EMULSION INTRAVENOUS at 17:23

## 2019-05-02 RX ADMIN — LORAZEPAM 1 MG: 2 INJECTION INTRAMUSCULAR; INTRAVENOUS at 14:53

## 2019-05-02 RX ADMIN — Medication 1 AMPULE: at 21:31

## 2019-05-02 RX ADMIN — Medication 1 AMPULE: at 09:39

## 2019-05-02 RX ADMIN — VITAMIN D, TAB 1000IU (100/BT) 2000 UNITS: 25 TAB at 17:31

## 2019-05-02 RX ADMIN — LEVOTHYROXINE SODIUM 125 MCG: 125 TABLET ORAL at 05:45

## 2019-05-02 RX ADMIN — RIFAXIMIN 200 MG: 200 TABLET ORAL at 09:39

## 2019-05-02 RX ADMIN — VITAMIN D, TAB 1000IU (100/BT) 2000 UNITS: 25 TAB at 09:39

## 2019-05-02 RX ADMIN — POTASSIUM CHLORIDE: 2 INJECTION, SOLUTION, CONCENTRATE INTRAVENOUS at 17:24

## 2019-05-02 RX ADMIN — ZOLPIDEM TARTRATE 5 MG: 5 TABLET ORAL at 02:04

## 2019-05-02 RX ADMIN — AMITRIPTYLINE HYDROCHLORIDE 100 MG: 50 TABLET, FILM COATED ORAL at 22:39

## 2019-05-02 RX ADMIN — PANTOPRAZOLE SODIUM 40 MG: 40 INJECTION, POWDER, FOR SOLUTION INTRAVENOUS at 09:35

## 2019-05-02 RX ADMIN — OLANZAPINE 2.5 MG: 2.5 TABLET, FILM COATED ORAL at 22:47

## 2019-05-02 RX ADMIN — OLANZAPINE 5 MG: 2.5 TABLET, FILM COATED ORAL at 02:04

## 2019-05-02 RX ADMIN — RIFAXIMIN 200 MG: 200 TABLET ORAL at 15:46

## 2019-05-02 RX ADMIN — CLONAZEPAM 4 MG: 1 TABLET ORAL at 22:39

## 2019-05-02 RX ADMIN — DIPHENHYDRAMINE HYDROCHLORIDE 12.5 MG: 50 INJECTION, SOLUTION INTRAMUSCULAR; INTRAVENOUS at 16:42

## 2019-05-02 NOTE — PROGRESS NOTES
Problem: Self Care Deficits Care Plan (Adult) Goal: *Acute Goals and Plan of Care (Insert Text) Description 1. Patient will perform grooming with supervision standing at sink. Progressing 2. Patient will perform Upper body dressing with stand by assist, seated. GOAL MET 4/29/2019 3. Patient will perform lower body dressing with contact guard assistance. 4. Patient will perform upper and lower body bathing with stand by assistance seated on shower chair. Progressing 5. Patient will perform shower transfer with stand by assistance. Progressing 6. Patient will participate in 30 + minutes of ADL/ therapeutic exercise/therapeutic activity with min rest breaks to increase activity tolerance for self care. GOAL MET 4/29/2019 7. Patient will perform ADL functional mobility in room with stand by assistance. Progressing Goals to be achieved in 7 days. Outcome: Progressing Towards Goal 
  
OCCUPATIONAL THERAPY: Initial Assessment, Daily Note and AM 5/2/2019 INPATIENT: OT Visit Days: 3 [place] / [place] B / Product Type: Medicare /  
  
NAME/AGE/GENDER: Ismael Barillas is a 74 y.o. male PRIMARY DIAGNOSIS:  Small bowel obstruction ([address] [K56.609] Small bowel obstruction ([address] Small bowel obstruction ([address] Procedure(s) (LRB): 
EXPLORATORY LAPAROTOMY, LYSIS OF ADHESIONS, SMALL BOWEL RESECTION X1, ENTEROTOMY REPAIR X3, REPAIR OF CYSTOTOMY (N/A) 29 Days Post-Op ICD-10: Treatment Diagnosis:  
 · Generalized Muscle Weakness (M62.81) Precautions/Allergies: 
   Patient has no known allergies. ASSESSMENT:  
Mr. Kaden Whitt presents with above diagnosis. Pt was seen in room with RN present. Pt with catheter, colostomy bag, and 2 IV poles. Pt moves fairly well requiring assistance with medical equipment management.  Pt wanting to walk, assisted with community distance ambulation and did well with straight walking but when in room with obstacles and visual scanning required contact guard assistance to minimal assistance due to loss of balance. This deficit makes bathroom mobility and self care more difficult, therefore, pt would benefit from OT to maximize safety and independence with self care and functional mobility. 4/29/2019 Pt seen in room with rehab aid present. RN disconnected one IV for shower but asked that we not get the one looking in his left arm wet. Pt was transferred to shower seated backward to keep right arm out of water. Pt completed shower, bowel needs, grooming and dressing as charted below. Pt needs encouragement to do things for himself but did well and is making progress. Would benefit from continue OT. 
 
5/2/19. Agreeable to ADL training. Improved self motivation for self bathing. Able to wash hair and face for first time in shower. Educated on importance of exercise and continued effort towards independence. Will need assist at d/c. This section established at most recent assessment PROBLEM LIST (Impairments causing functional limitations): 1. Decreased Strength 2. Decreased ADL/Functional Activities 3. Decreased Transfer Abilities 4. Decreased Balance 5. Decreased Activity Tolerance INTERVENTIONS PLANNED: (Benefits and precautions of occupational therapy have been discussed with the patient.) 1. Activities of daily living training 2. Adaptive equipment training 3. Balance training 4. Therapeutic activity 5. Therapeutic exercise TREATMENT PLAN: Frequency/Duration: Follow patient 3 times per week to address above goals. Rehabilitation Potential For Stated Goals: Good REHAB RECOMMENDATIONS (at time of discharge pending progress):   
Placement: It is my opinion, based on this patient's performance to date, that Mr. Marroquin may benefit from intensive therapy at a [address] [place] [address] after discharge due to the functional deficits listed above that are likely to improve with skilled rehabilitation and concerns that he/she may be unsafe to be unsupervised at home due to generalized weakness and decreased balance . Equipment:  
? None at this time OCCUPATIONAL PROFILE AND HISTORY:  
History of Present Injury/Illness (Reason for Referral): 
weakness Past Medical History/Comorbidities:  
Mr. Daisy Fonseca  has a past medical history of Family history of malignant neoplasm of gastrointestinal tract, Fecal incontinence, Former cigarette smoker, History of rectal cancer, Hypothyroid, Infection and inflammatory reaction due to other internal prosthetic devices, implants and grafts, subsequent encounter (2017), Insomnia, Osteoarthritis, hand, Personal history of colonic polyps (9/2013), Personal history of malignant neoplasm of rectum, rectosigmoid junction, and anus (9/2013), and Psychiatric disorder. He also has no past medical history of Adverse effect of anesthesia, Aneurysm ([address] Arrhythmia, Asthma, Autoimmune disease ([address] CAD (coronary artery disease), Chronic kidney disease, Chronic obstructive pulmonary disease ([address] Chronic pain, Coagulation disorder ([address] Diabetes ([address] Difficult intubation, Endocarditis, GERD (gastroesophageal reflux disease), Heart failure ([address] Hypertension, Ill-defined condition, Liver disease, Malignant hyperthermia due to anesthesia, Morbid obesity ([address] Nausea & vomiting, Other ill-defined conditions(799.89), Pseudocholinesterase deficiency, PUD (peptic ulcer disease), Rheumatic fever, Seizures ([address] Stroke ([address] Thromboembolus ([address] Unspecified adverse effect of anesthesia, or Unspecified sleep apnea.   Mr. Daisy Fonseca  has a past surgical history that includes endoscopy, colon, diagnostic (last 2/3/15); hx polypectomy; hx other surgical (10/7/2013); hx colectomy (11/2013); hx colectomy (Right, 8/2014); hx hernia repair (3/2015, 5/2016); hx gi (4/2016); hx colostomy (5/11/16); hx hernia repair; hx other surgical (Bilateral); colonoscopy thru stoma (2/12/2018); colonoscopy (N/A, 2/12/2018); and hx vascular access (Left, 4/14). Social History/Living Environment:  
Home Environment: Other (comment)([place]/condo) One/Two Story Residence: Two story Living Alone: Yes Support Systems: Family member(s), Friends \ neighbors Patient Expects to be Discharged to[de-identified] Other (comment)(townhouse/condo) Current DME Used/Available at Home: None Prior Level of Function/Work/Activity: 
Independent Number of Personal Factors/Comorbidities that affect the Plan of Care: Brief history (0):  LOW COMPLEXITY ASSESSMENT OF OCCUPATIONAL PERFORMANCE[de-identified]  
Activities of Daily Living:  
Basic ADLs (From Assessment) Complex ADLs (From Assessment) Feeding: (N/A) Oral Facial Hygiene/Grooming: Stand-by assistance Bathing: Minimum assistance Upper Body Dressing: Stand-by assistance Lower Body Dressing: Minimum assistance Toileting: (n/a catheter and colostomy bag) Grooming/Bathing/Dressing Activities of Daily Living Functional Transfers Bathroom Mobility: Contact guard assistance Shower Transfer: Contact guard assistance Bed/Mat Mobility Supine to Sit: Stand-by assistance Sit to Supine: Stand-by assistance Sit to Stand: Contact guard assistance Stand to Sit: Contact guard assistance Bed to Chair: Stand-by assistance Most Recent Physical Functioning:  
Gross Assessment: 
  
         
  
Posture: 
Posture (WDL): Exceptions to [facility] Posture Assessment: Forward head, Rounded shoulders Balance: 
  Bed Mobility: 
Supine to Sit: Stand-by assistance Sit to Supine: Stand-by assistance Wheelchair Mobility: 
  
Transfers: 
Sit to Stand: Contact guard assistance Stand to Sit: Contact guard assistance Bed to Chair: Stand-by assistance Patient Vitals for the past 6 hrs: 
 BP BP Patient Position SpO2 Pulse 05/02/19 0755 124/73 At rest;Head of bed elevated (Comment degrees) 98 % 89  
 
 
Mental Status Neurologic State: Alert Orientation Level: Oriented X4 Cognition: Appropriate decision making, Follows commands Perception: Appears intact Perseveration: No perseveration noted Safety/Judgement: Awareness of environment, Fall prevention Physical Skills Involved: 
1. Balance 2. Strength 3. Activity Tolerance Cognitive Skills Affected (resulting in the inability to perform in a timely and safe manner): 1. Executive Function Psychosocial Skills Affected: 1. Environmental Adaptation Number of elements that affect the Plan of Care: 3-5:  MODERATE COMPLEXITY CLINICAL DECISION MAKING:  
[facility] AM-PAC 6 Clicks Daily Activity Inpatient Short Form How much help from another person does the patient currently need. .. Total A Lot A Little None 1. Putting on and taking off regular lower body clothing? ? 1   ? 2   ? 3   ? 4  
2. Bathing (including washing, rinsing, drying)? ? 1   ? 2   ? 3   ? 4  
3. Toileting, which includes using toilet, bedpan or urinal?   ? 1   ? 2   ? 3   ? 4  
4. Putting on and taking off regular upper body clothing? ? 1   ? 2   ? 3   ? 4  
5. Taking care of personal grooming such as brushing teeth? ? 1   ? 2   ? 3   ? 4  
6. Eating meals? ? 1   ? 2   ? 3   ? 4  
© 2007, Trustees of [facility], under license to [employer]. All rights reserved Score:  Initial: 19 Most Recent: X (Date: -- ) Interpretation of Tool:  Represents activities that are increasingly more difficult (i.e. Bed mobility, Transfers, Gait). Medical Necessity:    
· Patient is expected to demonstrate progress in strength, balance, coordination and functional technique ·  to increase independence with self care · . Reason for Services/Other Comments: 
· pt would benefit from OT to maximize safety and independence with self care and functional mobility. Use of outcome tool(s) and clinical judgement create a POC that gives a: LOW COMPLEXITY  
 
 
 
TREATMENT:  
(In addition to Assessment/Re-Assessment sessions the following treatments were rendered) Pre-treatment Symptoms/Complaints:  No complaint of pain during self care Pain: Initial:  
Pain Intensity 1: 0 0 Post Session:  0 Self Care: (70min): Procedure(s) (per grid) utilized to improve and/or restore self-care/home management as related to dressing, bathing, toileting and grooming. Required minimal verbal and   cueing to facilitate activities of daily living skills and compensatory activities. Braces/Orthotics/Lines/Etc:  
· IV 
· brito catheter · Colostomy · O2 Device: Room air Treatment/Session Assessment:   
· Response to Treatment:  pt tolerated well but was certainly tired after the long ambulation · Interdisciplinary Collaboration:  
o Occupational Therapist 
o Registered Nurse 
o  
o Rehabilitation Attendant · After treatment position/precautions:  
o Supine in bed 
o Bed in low position 
o Call light within reach 
o RN notified · Compliance with Program/Exercises: Compliant all of the time, Will assess as treatment progresses. · Recommendations/Intent for next treatment session: \"Next visit will focus on advancements to more challenging activities and reduction in assistance provided\". Total Treatment Duration: OT Patient Time In/Time Out Time In: 0935 Time Out: 1015 Jaren Villatoro OT

## 2019-05-02 NOTE — PROGRESS NOTES
SHAILESH [place] on call for Vidya Reusing notified of patient unable to void since straight cath at 2330 and bladder scan of 200 ml of urine present; order given to replace brito catheter.

## 2019-05-02 NOTE — PROGRESS NOTES
Straight cath in and out performed due to inability to void since brito removal x1 per order; 550ml yellow clear urine obtained.

## 2019-05-02 NOTE — CONSULTS
Consult noted for urinary retention. Pt is 28 Days Post-Op  Procedure(s):  EXPLORATORY LAPAROTOMY, LYSIS OF ADHESIONS, SMALL BOWEL RESECTION X1, ENTEROTOMY REPAIR X3, REPAIR OF CYSTOTOMY    Previously consulted on 4/25 for same. Brito removed yesterday and he has unfortunately been unable to void again despite flomax. He will need to continue brito catheter at this point and he will follow-up in our office with Dr. Antonia Rangel once his current issues resolve. Last seen in 2014. Will schedule appt at time of d/c.

## 2019-05-02 NOTE — PROGRESS NOTES
Care Coordination with IP Case Management - D. [employer] Brief hospital visit with patient. Patient continues to be hospitalized: - Fistulagram yesterday 5/1/2019 
 - brito re-inserted 5/1/2019, urology re-consulted 
 - on TPN 
 - may transfer to [facility] tomorrow 5/3/2019 Plan - route note to SNF Coordinator

## 2019-05-02 NOTE — PROGRESS NOTES
Shift assessment complete via flowsheet. A&O. Denies pain/discomfort at present. No acute distress noted. Encouraged to call for assistance/needs. Bed low and locked.

## 2019-05-02 NOTE — PROGRESS NOTES
Admit Date: 3/30/2019 POD 29 Days Post-Op Procedure:  Procedure(s): EXPLORATORY LAPAROTOMY, LYSIS OF ADHESIONS, SMALL BOWEL RESECTION X1, ENTEROTOMY REPAIR X3, REPAIR OF CYSTOTOMY Subjective:  
 
Patient has required reinsertion of Bishop, again (4th time?) Had fistulagram yesterday. Objective:  
 
Visit Vitals /73 (BP 1 Location: Left arm, BP Patient Position: At rest;Head of bed elevated (Comment degrees)) Pulse 89 Temp 98.9 °F (37.2 °C) Resp 18 Ht 5' 8\" (1.727 m) Wt 132 lb (59.9 kg) SpO2 98% BMI 20.07 kg/m² Temp (24hrs), Av.7 °F (37.1 °C), Min:98 °F (36.7 °C), Max:99 °F (37.2 °C) Kyaw Barry I&O reviewed as documented. Physical Exam:  
 General-in no distress. Abdomen- Wound clean, healed and pt has no tenderness. Colostomy and fistula pouches intact. Labs:  
Recent Results (from the past 24 hour(s)) METABOLIC PANEL, BASIC Collection Time: 19  8:28 AM  
Result Value Ref Range Sodium 137 136 - 145 mmol/L Potassium 4.2 3.5 - 5.1 mmol/L Chloride 103 98 - 107 mmol/L  
 CO2 27 21 - 32 mmol/L Anion gap 7 mmol/L Glucose 129 (H) 65 - 100 mg/dL BUN 18 8 - 23 MG/DL Creatinine 0.60 (L) 0.8 - 1.5 MG/DL  
 GFR est AA >60 >60 ml/min/1.73m2 GFR est non-AA >60 ml/min/1.73m2 Calcium 8.4 8.3 - 10.4 MG/DL Assessment:  
 
Principal Problem: 
  Small bowel obstruction ([address] (3/30/2019) Active Problems: 
  Enterocutaneous fistula (2019) Plan/Recommendations/Medical Decision Making:  
 
Continue present treatment Will re-consult urology, as he has not been at all able to spontaneously empty his bladder. Check PSA By my view, fistula is likely in mid small bowel. Unfortunately, this would not allow resumption of oral intake and simple maintenance of the output as cold be done for a very distal fistula in this patient who already has minimal colon length. Will allow maturation of the fistulization process while on TPN- could then assess for viability of a fistula plug with IR as an option. Long term, he may require exploration. He is despondent but demonstrates understanding Plan transfer to rehab tomorrow

## 2019-05-02 NOTE — PROGRESS NOTES
Problem: Falls - Risk of 
Goal: *Absence of Falls Description Document Joshua Cason Fall Risk and appropriate interventions in the flowsheet.  
Outcome: Progressing Towards Goal

## 2019-05-02 NOTE — PROGRESS NOTES
Shift assessment complete via flowsheet. A&O x4. IJ intact to R neck. Denies pain/discomfort. TPN and IVF infusing without difficulty. No acute distress. Encouraged to call for assistance/needs. Bed low and locked.

## 2019-05-02 NOTE — PROGRESS NOTES
Nutrition follow-up:  
TPN management per nutrition support protocols/ Dr. Rigoberto Brody 4/09 Assessment: DIET NUTRITIONAL SUPPLEMENTS All Meals; Ensure [employer] DIET FULL LIQUID 
TPN ADULT-CENTRAL - dextrose 15% amino acid 5% Food/Nutrition History: 
The patient is POD 27- ex lap, lysis of adhesions, small bowel resection, enterotomy repair x3 and repair of cystotomy. Pt had SBFT 4/24 which showed no bowel obstruction. 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 Bishop replaced secondary urinary retention. Discussed with Dr. Rigoberto Brody this am, plan to transition to facility with TPN tomorrow. IVF discontinued this am following discussion. Pt interested in brushing his teeth this am.  Requests full liquids and ensure enlive only be delivered if he requests it. Multiple unopened bottles of water and several ensure enlive in room . He is consuming primarily sips of water by mouth at this time. Central Line Access:   Triple Lumen Right IJ Abdomen: EC fistula ostomy - 525ml output, colostomy scant output recorded. Pertinent Medications: NS @75ml/hr discontinued prior to new bag start this am, D3 Pertinent Labs: Na 137 (improved with TPN and prior IVF of NS). K 4.2 (upward trend) Cl 103, Glu 129, Ca 8.4 (corrected). Anthropometrics:Height: 5' 8\" (172.7 cm),  Weight: 58 kg (127 lb 12.8 oz), Weight Source: Bed, Body mass index is 19.43 kg/m². BMI class of underweight for age >71. Last 3 Recorded Weights in this Encounter 05/01/19 0422 05/01/19 5629 05/02/19 3537 Weight: 59.8 kg (131 lb 12.8 oz) 59.1 kg (130 lb 3.2 oz) 59.9 kg (132 lb) Most recent weight does not specify source of weight, presumed bed weight. Trace edema RLE - now new recorded data. Admission weight: 62.69 kg (Bed weight) Macronutrient needs: Based on weight of 60.6 kg 
EER:  6268-5439 kcal /day (25-30 kcal/kg listed BW) NSZ:  66-23 ZQIIR protein/day (1.2-1.5 grams/kg BW) GFR >60 Max CHO:  349 grams/day (4mg/kg BW/min) Fluid: 1ml/kcal 
 Intake/Comparative Standards:   
Oral: Continues without recorded intake; Recall per pt for yesterday <5% estimated needs. TPN: 15%DEX/ 5%AA at 80 ml/hr with 250 ml 20% Lipids over 12 hours daily provides 1778 kcal/d (100% of needs), 90 grams of protein/d (100% of needs), 270 grams of CHO/d (does not exceed maximum CHO load) and ~1.9 ml of total volume/d (1.1 ml/kcal). IVF: Provided an additional ~1.8 liters of fluid for 5/1-2 analysis. Total fluid provided daily intravenously ~3.7 liters with pt reporting oral consumption of <10 ounces yesterday. Intervention: 
Meals and snacks: Diet progression per MD as medically appropriate. Will not send trays unless patient request.   
Nutrition Supplement: Discontinue Ensure Enlive unless pt requests. Nutritional Supplement Therapy: Electrolyte replacement per nutritional support protocols are active on MAR. TPN:  
1. Continue current TPN. 15% dextrose, 5% amino acids @ 80ml/hr. 2.  TPN additives: 110 mEq/L NaCl, 25 mEq/L KCl, 20 mEq/L KPhos, 4.5 mEq/L Ca Gluconate, 8 mEq/L Mg Sulfate, MVI and trace elements. 3.  IVF d/c per discussion with MD.  
Continue PN labs, POC glucoses/SSI if glucose >180 mg/dl on AM BMP. Coordination of Care: Coby Alfonso, RN, Ede Chavez, RN Manager. Discharge Nutrition Plan: Anticipate pt to require TPN for primary needs. [place], [address], [facility]

## 2019-05-02 NOTE — PROGRESS NOTES
Virgen informed Ace Osage that Md has signed NH paperwork in possible anticipation of a discharge tomorrow to [facility]. Virgen told Ace Osage to look in [facility] for TPN instructions from [facility]. Pt started on TPN on 4/30 and an IJ was placed. Pt with urinary retention and will likely d/c with brito and follow up with Urology as outpt. Md to monitor fistula for possible surg in future. Sw to cont to follow and assist. Lindsay Aguilar

## 2019-05-02 NOTE — PROGRESS NOTES
Problem: Nutrition Deficit Goal: *Optimize nutritional status Outcome: Progressing Towards Goal; drinks ensure. TPN continues Problem: Patient Education: Go to Patient Education Activity Goal: Patient/Family Education Outcome: Progressing Towards Goal; Understands plan. Rehab transfer soon. Ostomy site does not appear to be functioning; bag is intact. The stoma is receded from last I saw patient, per report this is not new. The bag covering fistula site drains moderately of fecal looking/smelling liquid.

## 2019-05-03 VITALS
HEIGHT: 68 IN | HEART RATE: 86 BPM | BODY MASS INDEX: 19.78 KG/M2 | TEMPERATURE: 98.6 F | WEIGHT: 130.51 LBS | OXYGEN SATURATION: 94 % | DIASTOLIC BLOOD PRESSURE: 67 MMHG | RESPIRATION RATE: 18 BRPM | SYSTOLIC BLOOD PRESSURE: 129 MMHG

## 2019-05-03 LAB
ANION GAP SERPL CALC-SCNC: 9 MMOL/L
BUN SERPL-MCNC: 17 MG/DL (ref 8–23)
CALCIUM SERPL-MCNC: 8.3 MG/DL (ref 8.3–10.4)
CHLORIDE SERPL-SCNC: 101 MMOL/L (ref 98–107)
CO2 SERPL-SCNC: 27 MMOL/L (ref 21–32)
CREAT SERPL-MCNC: 0.57 MG/DL (ref 0.8–1.5)
GLUCOSE SERPL-MCNC: 125 MG/DL (ref 65–100)
MAGNESIUM SERPL-MCNC: 1.8 MG/DL (ref 1.8–2.4)
PHOSPHATE SERPL-MCNC: 4 MG/DL (ref 2.3–3.7)
POTASSIUM SERPL-SCNC: 4.1 MMOL/L (ref 3.5–5.1)
SODIUM SERPL-SCNC: 137 MMOL/L (ref 136–145)

## 2019-05-03 PROCEDURE — 74011250637 HC RX REV CODE- 250/637: Performed by: SURGERY

## 2019-05-03 PROCEDURE — 74011250636 HC RX REV CODE- 250/636: Performed by: SURGERY

## 2019-05-03 PROCEDURE — 84100 ASSAY OF PHOSPHORUS: CPT

## 2019-05-03 PROCEDURE — 80048 BASIC METABOLIC PNL TOTAL CA: CPT

## 2019-05-03 PROCEDURE — 36415 COLL VENOUS BLD VENIPUNCTURE: CPT

## 2019-05-03 PROCEDURE — 83735 ASSAY OF MAGNESIUM: CPT

## 2019-05-03 PROCEDURE — C9113 INJ PANTOPRAZOLE SODIUM, VIA: HCPCS | Performed by: SURGERY

## 2019-05-03 RX ADMIN — ZOLPIDEM TARTRATE 5 MG: 5 TABLET ORAL at 02:00

## 2019-05-03 RX ADMIN — PANTOPRAZOLE SODIUM 40 MG: 40 INJECTION, POWDER, FOR SOLUTION INTRAVENOUS at 09:01

## 2019-05-03 RX ADMIN — Medication 1 AMPULE: at 09:01

## 2019-05-03 RX ADMIN — LEVOTHYROXINE SODIUM 125 MCG: 125 TABLET ORAL at 06:18

## 2019-05-03 RX ADMIN — VITAMIN D, TAB 1000IU (100/BT) 2000 UNITS: 25 TAB at 09:00

## 2019-05-03 RX ADMIN — OLANZAPINE 5 MG: 2.5 TABLET, FILM COATED ORAL at 02:26

## 2019-05-03 RX ADMIN — TAMSULOSIN HYDROCHLORIDE 0.4 MG: 0.4 CAPSULE ORAL at 09:01

## 2019-05-03 RX ADMIN — RIFAXIMIN 200 MG: 200 TABLET ORAL at 09:00

## 2019-05-03 RX ADMIN — HYOSCYAMINE SULFATE 0.12 MG: 0.12 TABLET ORAL at 06:18

## 2019-05-03 RX ADMIN — HYOSCYAMINE SULFATE 0.12 MG: 0.12 TABLET ORAL at 00:10

## 2019-05-03 NOTE — DISCHARGE INSTRUCTIONS
DISCHARGE SUMMARY from Nurse    PATIENT INSTRUCTIONS:    After general anesthesia or intravenous sedation, for 24 hours or while taking prescription Narcotics:  · Limit your activities  · Do not drive and operate hazardous machinery  · Do not make important personal or business decisions  · Do  not drink alcoholic beverages  · If you have not urinated within 8 hours after discharge, please contact your surgeon on call. Report the following to your surgeon:  · Excessive pain, swelling, redness or odor of or around the surgical area  · Temperature over 100.5  · Nausea and vomiting lasting longer than 4 hours or if unable to take medications  · Any signs of decreased circulation or nerve impairment to extremity: change in color, persistent  numbness, tingling, coldness or increase pain  · Any questions    What to do at Home:  Recommended activity: {discharge activity:74246}, ***    If you experience any of the following symptoms ***, please follow up with ***. *  Please give a list of your current medications to your Primary Care Provider. *  Please update this list whenever your medications are discontinued, doses are      changed, or new medications (including over-the-counter products) are added. *  Please carry medication information at all times in case of emergency situations. These are general instructions for a healthy lifestyle:    No smoking/ No tobacco products/ Avoid exposure to second hand smoke  Surgeon General's Warning:  Quitting smoking now greatly reduces serious risk to your health.     Obesity, smoking, and sedentary lifestyle greatly increases your risk for illness    A healthy diet, regular physical exercise & weight monitoring are important for maintaining a healthy lifestyle    You may be retaining fluid if you have a history of heart failure or if you experience any of the following symptoms:  Weight gain of 3 pounds or more overnight or 5 pounds in a week, increased swelling in our hands or feet or shortness of breath while lying flat in bed. Please call your doctor as soon as you notice any of these symptoms; do not wait until your next office visit. Recognize signs and symptoms of STROKE:    F-face looks uneven    A-arms unable to move or move unevenly    S-speech slurred or non-existent    T-time-call 911 as soon as signs and symptoms begin-DO NOT go       Back to bed or wait to see if you get better-TIME IS BRAIN. Warning Signs of HEART ATTACK     Call 911 if you have these symptoms:   Chest discomfort. Most heart attacks involve discomfort in the center of the chest that lasts more than a few minutes, or that goes away and comes back. It can feel like uncomfortable pressure, squeezing, fullness, or pain.  Discomfort in other areas of the upper body. Symptoms can include pain or discomfort in one or both arms, the back, neck, jaw, or stomach.  Shortness of breath with or without chest discomfort.  Other signs may include breaking out in a cold sweat, nausea, or lightheadedness. Don't wait more than five minutes to call 911 - MINUTES MATTER! Fast action can save your life. Calling 911 is almost always the fastest way to get lifesaving treatment. Emergency Medical Services staff can begin treatment when they arrive -- up to an hour sooner than if someone gets to the hospital by car. The discharge information has been reviewed with the {PATIENT PARENT GUARDIAN:27903}. The {PATIENT PARENT GUARDIAN:00902} verbalized understanding. Discharge medications reviewed with the {Dishcarge meds reviewed VKCX:55411} and appropriate educational materials and side effects teaching were provided.   ___________________________________________________________________________________________________________________________________

## 2019-05-03 NOTE — PROGRESS NOTES
Care Management Interventions PCP Verified by CM: Yes Transition of Care Consult (CM Consult): SNF Partner SNF: Yes Physical Therapy Consult: Yes Current Support Network: Own Home Confirm Follow Up Transport: Other (see comment) Plan discussed with Pt/Family/Caregiver: Yes Freedom of Choice Offered: Yes Discharge Location Discharge Placement: Skilled nursing facility Pt transferring to [facility] [address]. RN called report. TPN orders called by dietician. ALS transport set with [facility] due to TPN infusing during transport. Transport scheduled for 12:15 which was earliest time [facility] could p/u. Davi Grant

## 2019-05-03 NOTE — PROGRESS NOTES
Shift Assessment - Patient is alert and oriented x4. No complaint of pain at this time. IVF and TPN infusing without difficulty. Ostomy draining as well as bag over midline fistula. Patient resting in a low, locked bed. Call light in reach.

## 2019-05-03 NOTE — PROGRESS NOTES
Nutrition follow-up:  
TPN management per nutrition support protocols/ Dr. Michael Gomez 4/09 Assessment: DIET FULL LIQUID 
TPN ADULT-CENTRAL - dextrose 15% amino acid 5% Food/Nutrition History: 
The patient is POD 30- ex lap, lysis of adhesions, small bowel resection, enterotomy repair x3 and repair of cystotomy. Pt had SBFT 4/24 which showed no bowel obstruction. 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 Bishop replaced secondary urinary retention. Remains TPN dependent secondary to EC fistula. Transitioned to TPN from PPN on 4/30 once central line access available. Transferring to facility today. Pt brighter today, asks questions about facility transfer and continuation of TPN at facility. Central Line Access:   Triple Lumen Right IJ Abdomen: EC fistula ostomy - 500ml output, colostomy 50ml output recorded. Pertinent Medications: D3, synthroid, protonix Pertinent Labs: Na 137, K 4.1, Cl 101, Glu 125, Phos 4. Anthropometrics:Height: 5' 8\" (172.7 cm),  Weight: 58 kg (127 lb 12.8 oz), Weight Source: Bed, Body mass index is 19.43 kg/m². BMI class of underweight for age >71. Last 3 Recorded Weights in this Encounter 05/01/19 4005 05/02/19 0552 05/03/19 6153 Weight: 59.1 kg (130 lb 3.2 oz) 59.9 kg (132 lb) 59.2 kg (130 lb 8.2 oz) Admission weight: 62.69 kg (Bed weight) Macronutrient needs: Based on weight of 60.6 kg 
EER:  5599-9746 kcal /day (25-30 kcal/kg listed BW) QPS:  03-71 TMJUQ protein/day (1.2-1.5 grams/kg BW) GFR >60 Max CHO:  349 grams/day (4mg/kg BW/min) Fluid: 1ml/kcal 
Intake/Comparative Standards:   
Oral: Recall per pt remains <5% estimated needs. TPN: 15%DEX/ 5%AA at 80 ml/hr with 250 ml 20% Lipids over 12 hours daily provides 1778 kcal/d (100% of needs), 90 grams of protein/d (100% of needs), 270 grams of CHO/d (does not exceed maximum CHO load) and ~1.9 ml of total volume/d (1.1 ml/kcal). Intervention: 
Meals and snacks: Diet progression per MD as medically appropriate.   Will not send trays unless patient request.   
Nutrition Supplement: Ensure Enlive only if pt requests. Nutritional Supplement Therapy: Electrolyte replacement per nutritional support protocols are active on MAR. TPN:  
1. Continue current TPN. 15% dextrose, 5% amino acids @ 80ml/hr. TPN additives: 110 mEq/L NaCl, 25 mEq/L KCl, 20 mEq/L KPhos, 4.5 mEq/L Ca Gluconate, 8 mEq/L Mg Sulfate, MVI and trace elements with transfer. Continue PN labs, POC glucoses/SSI if glucose >180 mg/dl on AM BMP. Coordination of Care: Candi Boston RN, Case Management, [facility], Pharmacist 
Discharge Nutrition Needs: Patient will require TPN on discharge. Current TPN and lipid orders faxed to facility with indication of modification that would be made here if pt were to remain inpatient. Continue current TPN and lipids with the following additive changes: decrease KPhos 15 mEq/L, increase KCl 30 mEq/L. Pt may transition to cyclic TPN as appropriate per facility direction. [place] [address] [facility] [place], [address], [place]

## 2019-05-03 NOTE — DISCHARGE SUMMARY
Physician Discharge Summary     Patient ID:  Nicole Guerrier  [id number]  74 y.o.  1945    Allergies: Patient has no known allergies. 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 90XA  ICD-9-CM: 959.01  4/20/2019 - 4/29/2019 No        RESOLVED: Forehead laceration, initial encounter ICD-10-CM: S01.81XA  ICD-9-CM: 873.42  4/20/2019 - 4/29/2019 No               Admission Condition: Fair    * Discharge Condition: fair and stable    * Procedures: Procedure(s):  EXPLORATORY LAPAROTOMY, LYSIS OF ADHESIONS, SMALL BOWEL RESECTION X1, ENTEROTOMY REPAIR X3, REPAIR OF CYSTOTOMY    * Hospital Course:   Pt was admitted several days after being discharged from admission for SBO with recurrent symptoms. After a brief period of observation, he underwent exploration for lysis of adhesions. His postoperative course has been complicated by recurrent urinary retention and, more prominently, delayed return of GI function. This was managed with hyperalimentation. On POD 20+, he demonstrated an enterocutaneous fistula, which is being managed effectively with a pouch. He will require prolonged hyperalimentation with minimal oral intake.     Consults: Urology    Significant Diagnostic Studies: -    * Disposition: [facility])    Discharge Medications:   Current Discharge Medication List      CONTINUE these medications which have NOT CHANGED    Details   OLANZapine (ZYPREXA) 2.5 mg tablet Take 2.5 mg by mouth nightly. Indications: TAKES 3 PILLS qHS      levothyroxine (SYNTHROID) 125 mcg tablet TAKE ONE TABLET BY MOUTH ONE TIME DAILY BEFORE BREAKFAST  Qty: 90 Tab, Refills: 3    Associated Diagnoses: Acquired hypothyroidism      Cholecalciferol, Vitamin D3, (VITAMIN D3) 2,000 unit cap capsule Take 2,000 Units by mouth two (2) times a day. Qty: 180 Cap, Refills: 3      clonazePAM (KLONOPIN) 2 mg tablet Take 2 Tabs by mouth nightly. Max Daily Amount: 4 mg. Indications: takes for sleep  Qty: 30 Tab, Refills: 0      amitriptyline (ELAVIL) 100 mg tablet Take 100 mg by mouth nightly. Dr Grecia Weller psych  Indications: takes for sleep             * Follow-up Care/Patient Instructions:   Activity: Activity as tolerated  Diet: minimal clear liquids  Wound Care: routine ostomy care x 2    Follow-up Information     Follow up With Specialties Details Why Contact Info    [facility] [address] [place] [address] [facility] [address] [phone]      Kaylin Flores,  Internal Medicine   [address] [facility]  [place] [address] [facility] [address]  [phone]          Follow-up tests/labs: BMP, Mg, Phos  every mon/wed/fri    Signed:  Comfort Viera MD  5/3/2019  7:53 AM

## 2019-05-06 ENCOUNTER — PATIENT OUTREACH (OUTPATIENT)
Dept: CASE MANAGEMENT | Age: 74
End: 2019-05-06

## 2019-05-06 NOTE — PROGRESS NOTES
Patient transferred to [facility] Friday 5/3 for STR Concerns Long LOS (3/30  5/3) Patient on TPN Discharge with a F/C (to follow up with Urology) Surgery will also follow while in STR to evaluate fistula RRAT of 16 defies the complexity of this patient. This note will not be viewable in [address]

## 2019-05-19 ENCOUNTER — HOSPITAL ENCOUNTER (INPATIENT)
Age: 74
LOS: 2 days | Discharge: NURSING, REHAB OR LONG-TERM CARE | DRG: 300 | End: 2019-05-22
Attending: EMERGENCY MEDICINE | Admitting: INTERNAL MEDICINE
Payer: MEDICARE

## 2019-05-19 ENCOUNTER — APPOINTMENT (OUTPATIENT)
Dept: GENERAL RADIOLOGY | Age: 74
DRG: 300 | End: 2019-05-19
Attending: EMERGENCY MEDICINE
Payer: MEDICARE

## 2019-05-19 DIAGNOSIS — K63.2 ENTEROCUTANEOUS FISTULA: ICD-10-CM

## 2019-05-19 DIAGNOSIS — T82.528A DISPLACEMENT OF CENTRAL VENOUS CATHETER (CVC) (HCC): Primary | ICD-10-CM

## 2019-05-19 DIAGNOSIS — F51.01 PRIMARY INSOMNIA: ICD-10-CM

## 2019-05-19 PROBLEM — Z78.9 ON TOTAL PARENTERAL NUTRITION (TPN): Status: ACTIVE | Noted: 2019-05-19

## 2019-05-19 PROBLEM — E44.0 MODERATE PROTEIN-CALORIE MALNUTRITION (HCC): Status: ACTIVE | Noted: 2019-05-19

## 2019-05-19 PROBLEM — E46 MALNUTRITION (HCC): Status: ACTIVE | Noted: 2019-05-19

## 2019-05-19 PROBLEM — Z43.3 COLOSTOMY CARE (HCC): Status: ACTIVE | Noted: 2019-05-19

## 2019-05-19 PROBLEM — Z97.8 CHRONIC INDWELLING FOLEY CATHETER: Status: ACTIVE | Noted: 2019-05-19

## 2019-05-19 LAB
ALBUMIN SERPL-MCNC: 2.4 G/DL (ref 3.2–4.6)
ALBUMIN/GLOB SERPL: 0.5 {RATIO} (ref 1.2–3.5)
ALP SERPL-CCNC: 335 U/L (ref 50–136)
ALT SERPL-CCNC: 43 U/L (ref 12–65)
ANION GAP SERPL CALC-SCNC: 9 MMOL/L (ref 7–16)
APPEARANCE UR: ABNORMAL
AST SERPL-CCNC: 53 U/L (ref 15–37)
BACTERIA SPEC CULT: NORMAL
BACTERIA URNS QL MICRO: ABNORMAL /HPF
BASOPHILS # BLD: 0 K/UL (ref 0–0.2)
BASOPHILS NFR BLD: 1 % (ref 0–2)
BILIRUB SERPL-MCNC: 0.7 MG/DL (ref 0.2–1.1)
BILIRUB UR QL: ABNORMAL
BUN SERPL-MCNC: 21 MG/DL (ref 8–23)
CALCIUM SERPL-MCNC: 8.7 MG/DL (ref 8.3–10.4)
CASTS URNS QL MICRO: ABNORMAL /LPF
CHLORIDE SERPL-SCNC: 103 MMOL/L (ref 98–107)
CO2 SERPL-SCNC: 26 MMOL/L (ref 21–32)
COLOR UR: ABNORMAL
CREAT SERPL-MCNC: 0.71 MG/DL (ref 0.8–1.5)
DIFFERENTIAL METHOD BLD: ABNORMAL
EOSINOPHIL # BLD: 0 K/UL (ref 0–0.8)
EOSINOPHIL NFR BLD: 1 % (ref 0.5–7.8)
EPI CELLS #/AREA URNS HPF: ABNORMAL /HPF
ERYTHROCYTE [DISTWIDTH] IN BLOOD BY AUTOMATED COUNT: 15.2 % (ref 11.9–14.6)
GLOBULIN SER CALC-MCNC: 4.8 G/DL (ref 2.3–3.5)
GLUCOSE BLD STRIP.AUTO-MCNC: 104 MG/DL (ref 65–100)
GLUCOSE BLD STRIP.AUTO-MCNC: 109 MG/DL (ref 65–100)
GLUCOSE SERPL-MCNC: 74 MG/DL (ref 65–100)
GLUCOSE UR STRIP.AUTO-MCNC: NEGATIVE MG/DL
HCT VFR BLD AUTO: 28.3 % (ref 41.1–50.3)
HGB BLD-MCNC: 8.5 G/DL (ref 13.6–17.2)
HGB UR QL STRIP: ABNORMAL
IMM GRANULOCYTES # BLD AUTO: 0 K/UL (ref 0–0.5)
IMM GRANULOCYTES NFR BLD AUTO: 1 % (ref 0–5)
KETONES UR QL STRIP.AUTO: ABNORMAL MG/DL
LEUKOCYTE ESTERASE UR QL STRIP.AUTO: ABNORMAL
LYMPHOCYTES # BLD: 0.4 K/UL (ref 0.5–4.6)
LYMPHOCYTES NFR BLD: 11 % (ref 13–44)
MAGNESIUM SERPL-MCNC: 1.9 MG/DL (ref 1.8–2.4)
MCH RBC QN AUTO: 25.9 PG (ref 26.1–32.9)
MCHC RBC AUTO-ENTMCNC: 30 G/DL (ref 31.4–35)
MCV RBC AUTO: 86.3 FL (ref 79.6–97.8)
MONOCYTES # BLD: 0.6 K/UL (ref 0.1–1.3)
MONOCYTES NFR BLD: 14 % (ref 4–12)
NEUTS SEG # BLD: 2.9 K/UL (ref 1.7–8.2)
NEUTS SEG NFR BLD: 73 % (ref 43–78)
NITRITE UR QL STRIP.AUTO: NEGATIVE
NRBC # BLD: 0 K/UL (ref 0–0.2)
OTHER OBSERVATIONS,UCOM: ABNORMAL
PH UR STRIP: 5.5 [PH] (ref 5–9)
PHOSPHATE SERPL-MCNC: 3.6 MG/DL (ref 2.3–3.7)
PLATELET # BLD AUTO: 210 K/UL (ref 150–450)
PMV BLD AUTO: 10.6 FL (ref 9.4–12.3)
POTASSIUM SERPL-SCNC: 4.6 MMOL/L (ref 3.5–5.1)
PROT SERPL-MCNC: 7.2 G/DL (ref 6.3–8.2)
PROT UR STRIP-MCNC: 100 MG/DL
RBC # BLD AUTO: 3.28 M/UL (ref 4.23–5.6)
RBC #/AREA URNS HPF: >100 /HPF
SERVICE CMNT-IMP: NORMAL
SODIUM SERPL-SCNC: 138 MMOL/L (ref 136–145)
SP GR UR REFRACTOMETRY: 1.02 (ref 1–1.02)
UROBILINOGEN UR QL STRIP.AUTO: 1 EU/DL (ref 0.2–1)
WBC # BLD AUTO: 3.9 K/UL (ref 4.3–11.1)
WBC URNS QL MICRO: >100 /HPF

## 2019-05-19 PROCEDURE — 87641 MR-STAPH DNA AMP PROBE: CPT

## 2019-05-19 PROCEDURE — 85025 COMPLETE CBC W/AUTO DIFF WBC: CPT

## 2019-05-19 PROCEDURE — 80053 COMPREHEN METABOLIC PANEL: CPT

## 2019-05-19 PROCEDURE — 74011250637 HC RX REV CODE- 250/637: Performed by: INTERNAL MEDICINE

## 2019-05-19 PROCEDURE — 96360 HYDRATION IV INFUSION INIT: CPT | Performed by: EMERGENCY MEDICINE

## 2019-05-19 PROCEDURE — 74011250636 HC RX REV CODE- 250/636: Performed by: EMERGENCY MEDICINE

## 2019-05-19 PROCEDURE — 77030034849

## 2019-05-19 PROCEDURE — 77030033269 HC SLV COMPR SCD KNE2 CARD -B

## 2019-05-19 PROCEDURE — 81001 URINALYSIS AUTO W/SCOPE: CPT

## 2019-05-19 PROCEDURE — 99284 EMERGENCY DEPT VISIT MOD MDM: CPT | Performed by: EMERGENCY MEDICINE

## 2019-05-19 PROCEDURE — 82962 GLUCOSE BLOOD TEST: CPT

## 2019-05-19 PROCEDURE — 84100 ASSAY OF PHOSPHORUS: CPT

## 2019-05-19 PROCEDURE — 71045 X-RAY EXAM CHEST 1 VIEW: CPT

## 2019-05-19 PROCEDURE — 83735 ASSAY OF MAGNESIUM: CPT

## 2019-05-19 PROCEDURE — 74011250636 HC RX REV CODE- 250/636: Performed by: INTERNAL MEDICINE

## 2019-05-19 RX ORDER — ACETAMINOPHEN 325 MG/1
650 TABLET ORAL
Status: DISCONTINUED | OUTPATIENT
Start: 2019-05-19 | End: 2019-05-20 | Stop reason: HOSPADM

## 2019-05-19 RX ORDER — SODIUM CHLORIDE 9 MG/ML
150 INJECTION, SOLUTION INTRAVENOUS CONTINUOUS
Status: DISCONTINUED | OUTPATIENT
Start: 2019-05-19 | End: 2019-05-19

## 2019-05-19 RX ORDER — SODIUM CHLORIDE 0.9 % (FLUSH) 0.9 %
5-40 SYRINGE (ML) INJECTION AS NEEDED
Status: DISCONTINUED | OUTPATIENT
Start: 2019-05-19 | End: 2019-05-20 | Stop reason: HOSPADM

## 2019-05-19 RX ORDER — DEXTROSE, SODIUM CHLORIDE, AND POTASSIUM CHLORIDE 5; .45; .15 G/100ML; G/100ML; G/100ML
100 INJECTION INTRAVENOUS CONTINUOUS
Status: DISCONTINUED | OUTPATIENT
Start: 2019-05-19 | End: 2019-05-20 | Stop reason: HOSPADM

## 2019-05-19 RX ORDER — INSULIN LISPRO 100 [IU]/ML
INJECTION, SOLUTION INTRAVENOUS; SUBCUTANEOUS
Status: DISCONTINUED | OUTPATIENT
Start: 2019-05-19 | End: 2019-05-20 | Stop reason: HOSPADM

## 2019-05-19 RX ORDER — CLONAZEPAM 1 MG/1
4 TABLET ORAL
Status: DISCONTINUED | OUTPATIENT
Start: 2019-05-19 | End: 2019-05-20 | Stop reason: HOSPADM

## 2019-05-19 RX ORDER — SODIUM CHLORIDE 0.9 % (FLUSH) 0.9 %
5-40 SYRINGE (ML) INJECTION EVERY 8 HOURS
Status: DISCONTINUED | OUTPATIENT
Start: 2019-05-19 | End: 2019-05-20 | Stop reason: HOSPADM

## 2019-05-19 RX ORDER — ONDANSETRON 2 MG/ML
4 INJECTION INTRAMUSCULAR; INTRAVENOUS
Status: DISCONTINUED | OUTPATIENT
Start: 2019-05-19 | End: 2019-05-20 | Stop reason: HOSPADM

## 2019-05-19 RX ORDER — OLANZAPINE 2.5 MG/1
2.5 TABLET ORAL
Status: DISCONTINUED | OUTPATIENT
Start: 2019-05-19 | End: 2019-05-20 | Stop reason: HOSPADM

## 2019-05-19 RX ORDER — LEVOTHYROXINE SODIUM 125 UG/1
125 TABLET ORAL
Status: DISCONTINUED | OUTPATIENT
Start: 2019-05-20 | End: 2019-05-20 | Stop reason: HOSPADM

## 2019-05-19 RX ADMIN — SODIUM CHLORIDE 150 ML/HR: 900 INJECTION, SOLUTION INTRAVENOUS at 16:14

## 2019-05-19 RX ADMIN — Medication 10 ML: at 17:49

## 2019-05-19 RX ADMIN — OLANZAPINE 2.5 MG: 2.5 TABLET, FILM COATED ORAL at 22:04

## 2019-05-19 RX ADMIN — CLONAZEPAM 4 MG: 1 TABLET ORAL at 22:04

## 2019-05-19 RX ADMIN — DEXTROSE MONOHYDRATE, SODIUM CHLORIDE, AND POTASSIUM CHLORIDE 100 ML/HR: 50; 4.5; 1.49 INJECTION, SOLUTION INTRAVENOUS at 17:54

## 2019-05-19 NOTE — ED NOTES
TRANSFER - OUT REPORT: 
 
Verbal report given to [facility] RN(name) on Elena Davis  being transferred to 363(unit) for routine progression of care Report consisted of patients Situation, Background, Assessment and  
Recommendations(SBAR). Information from the following report(s) ED Summary was reviewed with the receiving nurse. Lines:  
Peripheral IV 05/19/19 Left Forearm (Active) Site Assessment Clean, dry, & intact 5/19/2019  4:14 PM  
Phlebitis Assessment 0 5/19/2019  4:14 PM  
Infiltration Assessment 0 5/19/2019  4:14 PM  
Dressing Status Clean, dry, & intact 5/19/2019  4:14 PM  
Dressing Type Transparent 5/19/2019  4:14 PM  
Hub Color/Line Status Pink 5/19/2019  4:14 PM  
Action Taken Blood drawn 5/19/2019  4:14 PM  
  
 
Opportunity for questions and clarification was provided. Patient transported with: 
 wedgies

## 2019-05-19 NOTE — PROGRESS NOTES
TRANSFER - IN REPORT: 
 
Verbal report received from ER(name) on Tarsha Aguilar  being received from ER(unit) for routine progression of care Report consisted of patients Situation, Background, Assessment and  
Recommendations(SBAR). Information from the following report(s) SBAR was reviewed with the receiving nurse. Opportunity for questions and clarification was provided. Assessment completed upon patients arrival to unit and care assumed.

## 2019-05-19 NOTE — PROGRESS NOTES
05/19/19 1720 Dual Skin Pressure Injury Assessment Dual Skin Pressure Injury Assessment X Second Care Provider (Based on [address] [place] [address]) Britton Kiran Skin Integumentary Skin Integumentary (WDL) X Skin Color Appropriate for ethnicity Skin Condition/Temp Warm;Dry Skin Integrity Incision (comment) (R-fissure with ostomy bag and L colostomy) Turgor Epidermis thin w/ loss of subcut tissue Pressure  Injury Documentation No Pressure Injury Noted-Pressure Ulcer Prevention Initiated Wound Prevention and Protection Methods Orientation of Wound Prevention Posterior Location of Wound Prevention Sacrum/Coccyx Dressing Present  No  
Wound Offloading (Prevention Methods) Bed, pressure reduction mattress;Repositioning;Pillows Josef Scale (11years of age and older) Sensory Perception 4 Moisture 4 Activity 3 Activity Interventions Increase time out of bed;Pressure redistribution bed/mattress(bed type) Mobility 3 Mobility Interventions HOB 30 degrees or less;Pressure redistribution bed/mattress (bed type) Nutrition 3 Nutrition Interventions Document food/fluid/supplement intake Friction and Shear 3 Josef Score 20

## 2019-05-19 NOTE — PROGRESS NOTES
Patient admitted to room. Oriented to surroundings use of call light and telephone. Bed low and locked. Bishop catheter changed out per MD orders, stat lock applied, 100 cc's bloody UOP obtained. Bag dated and timed.

## 2019-05-19 NOTE — PROGRESS NOTES
Called and spoke with primary RN about PICC line placement. Patient was in hospital on 4/9/19 and assessed for PICC placement before central line was placed. Patient's veins were non compressible in bilateral arms.

## 2019-05-19 NOTE — ED PROVIDER NOTES
64-year-old male presents [facility] with complaints of this dislodged intravenous catheter Patient states that he woke up this morning with a trickle blood on his chest 
He is unsure how the central line became dislodged Patient reports that he still getting TPN through the catheter. While his fistula is healing. IJ line was placed, 4/29/2019 by Dr. Pankaj Bradshaw Patient denies any chest pain or shortness of breath. No recent fever, vomiting or diarrhea Patient states he is currently taking sips of liquids only The history is provided by the patient, the EMS personnel and the nursing home. Other This is a new problem. The current episode started 3 to 5 hours ago. The problem occurs constantly. Pertinent negatives include no chest pain, no abdominal pain, no headaches and no shortness of breath. Nothing aggravates the symptoms. Nothing relieves the symptoms. He has tried nothing for the symptoms. Past Medical History:  
Diagnosis Date  Family history of malignant neoplasm of gastrointestinal tract   
 mother colon/ovarian cancer 67  Fecal incontinence LAR syndrome  Former cigarette smoker  History of rectal cancer  Hypothyroid   
 stable w/med  Infection and inflammatory reaction due to other internal prosthetic devices, implants and grafts, subsequent encounter 2017  
 abd wound/mesh colostomy  Insomnia   
 takes meds  Osteoarthritis, hand  Personal history of colonic polyps 9/2013  
 x 1  
 Personal history of malignant neoplasm of rectum, rectosigmoid junction, and anus 9/2013  Psychiatric disorder   
 anxiety Past Surgical History:  
Procedure Laterality Date  COLONOSCOPY N/A 2/12/2018 COLONOSCOPY / BMI=21 performed by Marisela Garcia MD at [facility] ENDOSCOPY  COLONOSCOPY THRU STOMA  2/12/2018  ENDOSCOPY, COLON, DIAGNOSTIC  last 2/3/15 [facility]--no polyps--3 year recall  HX COLECTOMY  11/2013 [facility]--lap LAR with coloproctostomy, mobilization splenic flexure, diverting loop ileostomy  HX COLECTOMY Right 2014  
 for leak  HX COLOSTOMY  16  HX GI  2016 Sacral nerve stimlator lead trial (unsucessful) and removal  
 HX HERNIA REPAIR  3/2015, 2016  HX HERNIA REPAIR    
 and Colostomy in May  HX OTHER SURGICAL  10/7/2013 [facility]---endorectal us  HX OTHER SURGICAL Bilateral   
 cataracts    HX POLYPECTOMY  HX VASCULAR ACCESS Left   
 single lumen port/subsequently removed Family History:  
Problem Relation Age of Onset  Cancer Mother   
     colon and ovarian  Cancer Brother   
     prostate  Alcohol abuse Brother  Cancer Maternal Grandmother   
     bone  Alcohol abuse Father  Alcohol abuse Sister  Stroke Paternal Grandfather Social History Socioeconomic History  Marital status: SINGLE Spouse name: Not on file  Number of children: Not on file  Years of education: Not on file  Highest education level: Not on file Occupational History  Not on file Social Needs  Financial resource strain: Not on file  Food insecurity:  
  Worry: Not on file Inability: Not on file  Transportation needs:  
  Medical: Not on file Non-medical: Not on file Tobacco Use  Smoking status: Former Smoker Last attempt to quit: 1963 Years since quittin.5  Smokeless tobacco: Never Used Substance and Sexual Activity  Alcohol use: Not Currently Alcohol/week: 7.0 oz Types: 14 Cans of beer per week Comment: advised stop drinking given issues  Drug use: No  
 Sexual activity: Never Lifestyle  Physical activity:  
  Days per week: Not on file Minutes per session: Not on file  Stress: Not on file Relationships  Social connections:  
  Talks on phone: Not on file Gets together: Not on file Attends [employer] service: Not on file Active member of club or organization: Not on file Attends meetings of clubs or organizations: Not on file Relationship status: Not on file  Intimate partner violence:  
  Fear of current or ex partner: Not on file Emotionally abused: Not on file Physically abused: Not on file Forced sexual activity: Not on file Other Topics Concern  Not on file Social History Narrative  Not on file ALLERGIES: Patient has no known allergies. Review of Systems Constitutional: Negative for activity change, chills, diaphoresis and fever. HENT: Negative for dental problem, hearing loss, nosebleeds, rhinorrhea and sore throat. Eyes: Negative for pain, discharge, redness and visual disturbance. Respiratory: Negative for cough, chest tightness and shortness of breath. Cardiovascular: Negative for chest pain, palpitations and leg swelling. Gastrointestinal: Negative for abdominal pain, constipation, diarrhea, nausea and vomiting. Endocrine: Negative for cold intolerance, heat intolerance, polydipsia and polyuria. Genitourinary: Negative for dysuria and flank pain. Musculoskeletal: Positive for arthralgias. Negative for back pain, joint swelling, myalgias and neck pain. Skin: Negative for pallor and rash. Allergic/Immunologic: Negative for environmental allergies and food allergies. Neurological: Positive for weakness. Negative for dizziness, tremors, light-headedness, numbness and headaches. Hematological: Negative for adenopathy. Does not bruise/bleed easily. Psychiatric/Behavioral: Negative for confusion and dysphoric mood. The patient is not nervous/anxious and is not hyperactive. All other systems reviewed and are negative. Vitals:  
 05/19/19 1130 BP: 135/68 Pulse: 81 Resp: 16 Temp: 98.3 °F (36.8 °C) SpO2: 98% Weight: 54.4 kg (120 lb) Height: 5' 8.5\" (1.74 m) Physical Exam  
 Constitutional: He is oriented to person, place, and time. He appears well-developed and well-nourished. He appears distressed. HENT:  
Head: Normocephalic and atraumatic. Mouth/Throat: Oropharynx is clear and moist. No oropharyngeal exudate. Eyes: Pupils are equal, round, and reactive to light. Conjunctivae and EOM are normal. No scleral icterus. Neck: Normal range of motion. Neck supple. No JVD present. No thyromegaly present. The right neck. Dressing is removed. The dressing consisted of a Kerlix wrapped around the entirety of the patient's neck and a crumpled white paper towel No bleeding or drainage is noted There is no swelling at the site No induration or erythema Cardiovascular: Normal rate, regular rhythm, normal heart sounds and intact distal pulses. Exam reveals no gallop and no friction rub. No murmur heard. Pulmonary/Chest: Effort normal and breath sounds normal. No respiratory distress. He has no wheezes. Abdominal: Soft. Bowel sounds are normal. He exhibits no distension. There is no hepatosplenomegaly. Ostomy site is clean, dry and intact Musculoskeletal: Normal range of motion. He exhibits no edema, tenderness or deformity. Neurological: He is alert and oriented to person, place, and time. No cranial nerve deficit or sensory deficit. He exhibits normal muscle tone. Coordination normal.  
Skin: Skin is warm and dry. Capillary refill takes less than 2 seconds. No rash noted. There is pallor. Psychiatric: He has a normal mood and affect. His behavior is normal. Judgment and thought content normal.  
Nursing note and vitals reviewed. MDM Number of Diagnoses or Management Options Displacement of central venous catheter (CVC) [facility]): new and requires workup Enterocutaneous fistula: new and requires workup Diagnosis management comments: Will check a chest x-ray to rule out retained foreign body. Lungs are clear. No loss of airway for any signs of pneumothorax We'll contact surgery service after chest x-ray reviewed 3:56 PM 
Chest x-ray unremarkable. Case discussed with general surgery (×3). PICC team feels that the patient did not have veins amenable to placement of the line during his last hospitalization. I discussed case with the hospitalist who is graciously agreed to admit this patient for hydration and observation overnight Plan to consult surgery and IV team in the morning to obtain central access for ongoing TPN. Amount and/or Complexity of Data Reviewed Clinical lab tests: ordered and reviewed Tests in the radiology section of CPT®: ordered and reviewed Review and summarize past medical records: yes Risk of Complications, Morbidity, and/or Mortality Presenting problems: moderate Diagnostic procedures: moderate Management options: moderate General comments: Elements of this note have been dictated via voice recognition software. Text and phrases may be limited by the accuracy of the software. The chart has been reviewed, but errors may still be present. Patient Progress Patient progress: improved Procedures

## 2019-05-19 NOTE — ED TRIAGE NOTES
Pt to ED via EMS from [facility] c/o accidentally pulling out central line from right side of neck. Unsure of how this happened. No bleeding noted. Not currently on blood thinners. No shortness of breath, chest pain. Here to have new central line placed

## 2019-05-19 NOTE — H&P
Hospitalist H&P Note Admit Date:  2019 11:26 AM  
Name:  Elena Davis Age:  74 y.o. 
:  1945 MRN:  [id number] PCP:  Roel Smith DO Treatment Team: Attending Provider: Sandee Quarles DO; Surgeon: Arnoldo Mujica MD 
 
HPI:  
Patient history was obtained from the ER provider prior to seeing the patient. 78-year-old male presents from [facility] [place] with complaints of this dislodged intravenous catheter Patient states that he woke up this morning with a trickle blood on his chest 
He is unsure how the central line became dislodged 
  
Patient reports that he still getting TPN through the catheter. While his fistula is healing. 
  
IJ line was placed, 2019 by Dr. Estelle Gonzalez 
  
Patient denies any chest pain or shortness of breath. No recent fever, vomiting or diarrhea Patient states he is currently taking sips of liquids only 
  
The history is provided by the patient, the EMS personnel and the nursing home. Other This is a new problem. The current episode started 3 to 5 hours ago. The problem occurs constantly. Pertinent negatives include no chest pain, no abdominal pain, no headaches and no shortness of breath. Nothing aggravates the symptoms. Nothing relieves the symptoms. He has tried nothing for the symptoms. 19: 
Cc: loss of iv access for tpn, TPN depend. Unfortunate 74 y.o. Male diagnosed rectal cancer >5yr ago and is chronic brito and tpn dependant. Recent long hospital stay has had recurrent sbo, abcesses, and recent enterocutaneous fistula with ileostomy. He also has colostomy. He was discharged 5/3 by general surgery to [facility]. Has done ok but lost his nontunneled right IJ central venous access last pm. To er and not able to re-insert. He needs a tunneled access as he has been recently told by surgery sips of water for meds only and will need prolonged tpn.  I spoke with general surgery and I will admit to observation for them and try arrange IR for tunneled access hopeful 5/20/19 . He may have peripheral ivf d5 1/2nss with kcl as temporizing measure. He is usure of brito catheter change date and will change brito cath and check ua --- dark concentrated urine but clear. 10 systems reviewed and negative except as noted in HPI. Past Medical History:  
Diagnosis Date  Family history of malignant neoplasm of gastrointestinal tract   
 mother colon/ovarian cancer 67  Fecal incontinence LAR syndrome  Former cigarette smoker  History of rectal cancer  Hypothyroid   
 stable w/med  Infection and inflammatory reaction due to other internal prosthetic devices, implants and grafts, subsequent encounter 2017  
 abd wound/mesh colostomy  Insomnia   
 takes meds  Osteoarthritis, hand  Personal history of colonic polyps 9/2013  
 x 1  
 Personal history of malignant neoplasm of rectum, rectosigmoid junction, and anus 9/2013  Psychiatric disorder   
 anxiety Past Surgical History:  
Procedure Laterality Date  COLONOSCOPY N/A 2/12/2018 COLONOSCOPY / BMI=21 performed by Edilma Seals MD at [facility] ENDOSCOPY  COLONOSCOPY THRU STOMA  2/12/2018  ENDOSCOPY, COLON, DIAGNOSTIC  last 2/3/15 Emily--no polyps--3 year recall  HX COLECTOMY  11/2013 Emily--lap LAR with coloproctostomy, mobilization splenic flexure, diverting loop ileostomy  HX COLECTOMY Right 8/2014  
 for leak  HX COLOSTOMY  5/11/16  HX GI  4/2016 Sacral nerve stimlator lead trial (unsucessful) and removal  
 HX HERNIA REPAIR  3/2015, 5/2016  HX HERNIA REPAIR    
 and Colostomy in May  HX OTHER SURGICAL  10/7/2013 [facility]---endorectal us  HX OTHER SURGICAL Bilateral   
 cataracts  2017  HX POLYPECTOMY  HX VASCULAR ACCESS Left 4/14  
 single lumen port/subsequently removed No Known Allergies Social History Tobacco Use  Smoking status: Former Smoker Last attempt to quit: 1963 Years since quittin.5  Smokeless tobacco: Never Used Substance Use Topics  Alcohol use: Not Currently Alcohol/week: 7.0 oz Types: 14 Cans of beer per week Comment: advised stop drinking given issues Family History Problem Relation Age of Onset  Cancer Mother   
     colon and ovarian  Cancer Brother   
     prostate  Alcohol abuse Brother  Cancer Maternal Grandmother   
     bone  Alcohol abuse Father  Alcohol abuse Sister  Stroke Paternal Grandfather Immunization History Administered Date(s) Administered  TB Skin Test (PPD) Intradermal 2014, 2014, 2014, 2015, 2016, 2019, 2019 PTA Medications: 
Prior to Admission Medications Prescriptions Last Dose Informant Patient Reported? Taking? Cholecalciferol, Vitamin D3, (VITAMIN D3) 2,000 unit cap capsule   No No  
Sig: Take 2,000 Units by mouth two (2) times a day. OLANZapine (ZYPREXA) 2.5 mg tablet   Yes No  
Sig: Take 2.5 mg by mouth nightly. Indications: TAKES 3 PILLS qHS  
amitriptyline (ELAVIL) 100 mg tablet   Yes No  
Sig: Take 100 mg by mouth nightly. Dr Praveena Recinos psych  Indications: takes for sleep  
clonazePAM (KLONOPIN) 2 mg tablet   No No  
Sig: Take 2 Tabs by mouth nightly. Max Daily Amount: 4 mg. Indications: takes for sleep Patient taking differently: Take 2 mg by mouth nightly. Dr Praveena Recinos psych  Indications: takes for sleep-TAKES 3PILLS  
levothyroxine (SYNTHROID) 125 mcg tablet   No No  
Sig: TAKE ONE TABLET BY MOUTH ONE TIME DAILY BEFORE BREAKFAST Facility-Administered Medications: None Objective:  
 
Patient Vitals for the past 24 hrs: 
 Temp Pulse Resp BP SpO2  
19 1710 97.9 °F (36.6 °C) 88 18 129/80 98 % 19 1516    118/70 98 % 19 1447     98 % 19 1130 98.3 °F (36.8 °C) 81 16 135/68 98 % Oxygen Therapy O2 Sat (%): 98 % (19 1710) Pulse via Oximetry: 93 beats per minute (05/19/19 1516) O2 Device: Room air (05/19/19 1720) Intake/Output Summary (Last 24 hours) at 5/19/2019 1737 Last data filed at 5/19/2019 1710 Gross per 24 hour Intake  Output 500 ml Net -500 ml Physical Exam: 
General:    Lean near cachectic alert and cooperative Eyes:   Normal sclera. Extraocular movements intact. ENT:  Normocephalic, atraumatic. Moist mucous membranes Neck:  Right ij prior site erythema. Left dermal cyst with drainage CV:   RRR. No m/r/g. Peripheral pulses 2+. Capillary refill <2s. Lungs:  CTAB. No wheezing, rhonchi, or rales. Abdomen: Multiple cicatrix, ileostomy and colostomy noted , brito- meatus ok Extremities: Warm and dry. No cyanosis or edema. lean body mass Neurologic: CN II-XII grossly intact. Sensation intact. Skin:     No rashes or jaundice. Normal coloration Psych:  Normal mood and affect. I reviewed the labs, imaging, EKGs, telemetry, and other studies done this admission. Data Review:  
Recent Results (from the past 24 hour(s)) CBC WITH AUTOMATED DIFF Collection Time: 05/19/19  4:15 PM  
Result Value Ref Range WBC 3.9 (L) 4.3 - 11.1 K/uL  
 RBC 3.28 (L) 4.23 - 5.6 M/uL HGB 8.5 (L) 13.6 - 17.2 g/dL HCT 28.3 (L) 41.1 - 50.3 % MCV 86.3 79.6 - 97.8 FL  
 MCH 25.9 (L) 26.1 - 32.9 PG  
 MCHC 30.0 (L) 31.4 - 35.0 g/dL  
 RDW 15.2 (H) 11.9 - 14.6 % PLATELET 942 313 - 104 K/uL MPV 10.6 9.4 - 12.3 FL ABSOLUTE NRBC 0.00 0.0 - 0.2 K/uL  
 DF AUTOMATED NEUTROPHILS 73 43 - 78 % LYMPHOCYTES 11 (L) 13 - 44 % MONOCYTES 14 (H) 4.0 - 12.0 % EOSINOPHILS 1 0.5 - 7.8 % BASOPHILS 1 0.0 - 2.0 % IMMATURE GRANULOCYTES 1 0.0 - 5.0 %  
 ABS. NEUTROPHILS 2.9 1.7 - 8.2 K/UL  
 ABS. LYMPHOCYTES 0.4 (L) 0.5 - 4.6 K/UL  
 ABS. MONOCYTES 0.6 0.1 - 1.3 K/UL  
 ABS. EOSINOPHILS 0.0 0.0 - 0.8 K/UL  
 ABS. BASOPHILS 0.0 0.0 - 0.2 K/UL  
 ABS. IMM. GRANS. 0.0 0.0 - 0.5 K/UL METABOLIC PANEL, COMPREHENSIVE Collection Time: 05/19/19  4:15 PM  
Result Value Ref Range Sodium 138 136 - 145 mmol/L Potassium 4.6 3.5 - 5.1 mmol/L Chloride 103 98 - 107 mmol/L  
 CO2 26 21 - 32 mmol/L Anion gap 9 7 - 16 mmol/L Glucose 74 65 - 100 mg/dL BUN 21 8 - 23 MG/DL Creatinine 0.71 (L) 0.8 - 1.5 MG/DL  
 GFR est AA >60 >60 ml/min/1.73m2 GFR est non-AA >60 >60 ml/min/1.73m2 Calcium 8.7 8.3 - 10.4 MG/DL Bilirubin, total 0.7 0.2 - 1.1 MG/DL  
 ALT (SGPT) 43 12 - 65 U/L  
 AST (SGOT) 53 (H) 15 - 37 U/L Alk. phosphatase 335 (H) 50 - 136 U/L Protein, total 7.2 6.3 - 8.2 g/dL Albumin 2.4 (L) 3.2 - 4.6 g/dL Globulin 4.8 (H) 2.3 - 3.5 g/dL A-G Ratio 0.5 (L) 1.2 - 3.5 PHOSPHORUS Collection Time: 05/19/19  4:15 PM  
Result Value Ref Range Phosphorus 3.6 2.3 - 3.7 MG/DL MAGNESIUM Collection Time: 05/19/19  4:15 PM  
Result Value Ref Range Magnesium 1.9 1.8 - 2.4 mg/dL GLUCOSE, POC Collection Time: 05/19/19  5:23 PM  
Result Value Ref Range Glucose (POC) 109 (H) 65 - 100 mg/dL All Micro Results Procedure Component Value Units Date/Time MRSA SCREEN - PCR (NASAL) [[id number]] Order Status:  Sent Specimen:  Nasal   
  
 
 
Other Studies: Xr Chest [facility] Result Date: 5/19/2019 EXAM: Single view chest radiograph. INDICATION: 73 years dislodged IJ catheter COMPARISON: Chest radiograph dated April 29, 2019. FINDINGS: Interval removal of a right IJ central venous catheter. No pneumothorax. Unchanged elevation of the left hemidiaphragm with atelectatic change of the left lung base. IMPRESSION: 1. Removal of a right IJ central venous catheter. 2. Unchanged elevation of the left hemidiaphragm with left basilar atelectasis. Assessment and Plan:  
 
Hospital Problems as of 5/19/2019 Date Reviewed: 12/17/2018 Codes Class Noted - Resolved POA  Malnutrition ([facility] [address] ICD-10-CM: Y25 
 ICD-9-CM: 263.9  5/19/2019 - Present Unknown On total parenteral nutrition (TPN) ICD-10-CM: Z78.9 ICD-9-CM: V49.89  5/19/2019 - Present Unknown Colostomy care [facility]) ICD-10-CM: Z43.3 ICD-9-CM: V55.3  5/19/2019 - Present Unknown Chronic indwelling Bishop catheter ICD-10-CM: Z96.0 ICD-9-CM: V45.89  5/19/2019 - Present Unknown Moderate protein-calorie malnutrition ([facility] [address] ICD-10-CM: E44.0 ICD-9-CM: 263.0  5/19/2019 - Present Unknown Enterocutaneous fistula ICD-10-CM: J38.4 ICD-9-CM: 569.81  4/29/2019 - Present Yes Attention to ileostomy [facility]) ICD-10-CM: K09.9 ICD-9-CM: V55.2  8/20/2014 - Present Yes Dehydration ICD-10-CM: E86.0 ICD-9-CM: 276.51  4/17/2014 - Present Yes Rectal cancer ([email or web address] ICD-10-CM: C20 
ICD-9-CM: 154.1  10/22/2013 - Present Yes PLAN: 
-- TPN dependent and loss of access- needs tunneled access- cannot transport back to rehab without at least temporary central access for tpn PICC team contacted and recent eval -  He was not a candidate for picc  
  apparently due to \"noncompressible veins\" ? Needs more appropriate tunneled access then could be discharged to rehab  With tpn orders. General surgery consult re: their patient and asssume care if remains in hospital unless some reason for medicine assume care. --dehydration  
 ivf 
-- moderate protein jose maln. -- chronic anemia  Improved form earlier April hb 8.1 but clinically dry 
 
-- high dose klonopin qhs dependance for insomnia- caution 
 
 
--hx of hypothroid, rectal ca , enterocut. Fistula, prior multple bowel obstructions ,abcesses etic Discharge planning:  Back to [facility] rehab DVT ppx: scd Code status:  Full-- I discussed with him Decision Maker: friend on file Sebastian Doss [phone] Admit status:observation Estimated LOS:  less than 2 midnights Risk:  high Signed: 
Mustapha Mariano DO

## 2019-05-19 NOTE — PROGRESS NOTES
TRANSFER - IN REPORT: 
 
Verbal report received from Rikki Kennedy (name) on Gonzalo Lucero  being received from ER (unit) for routine progression of care Report consisted of patients Situation, Background, Assessment and  
Recommendations(SBAR). Information from the following report(s) SBAR, Kardex, Intake/Output, MAR and Recent Results was reviewed with the receiving nurse. Opportunity for questions and clarification was provided. Assessment completed upon patients arrival to unit and care assumed.

## 2019-05-20 ENCOUNTER — PATIENT OUTREACH (OUTPATIENT)
Dept: CASE MANAGEMENT | Age: 74
End: 2019-05-20

## 2019-05-20 ENCOUNTER — HOSPITAL ENCOUNTER (OUTPATIENT)
Dept: INTERVENTIONAL RADIOLOGY/VASCULAR | Age: 74
Discharge: HOME | DRG: 300 | End: 2019-05-20
Attending: INTERNAL MEDICINE
Payer: MEDICARE

## 2019-05-20 VITALS
RESPIRATION RATE: 16 BRPM | HEART RATE: 99 BPM | OXYGEN SATURATION: 100 % | DIASTOLIC BLOOD PRESSURE: 69 MMHG | TEMPERATURE: 97.8 F | SYSTOLIC BLOOD PRESSURE: 123 MMHG

## 2019-05-20 PROBLEM — O22.30 DVT (DEEP VEIN THROMBOSIS) IN PREGNANCY: Status: ACTIVE | Noted: 2019-05-20

## 2019-05-20 PROBLEM — I82.90 ACUTE DEEP VEIN THROMBOSIS (DVT) OF NON-EXTREMITY VEIN: Status: ACTIVE | Noted: 2019-05-20

## 2019-05-20 LAB
ALBUMIN SERPL-MCNC: 2.6 G/DL (ref 3.2–4.6)
ALBUMIN/GLOB SERPL: 0.6 {RATIO} (ref 1.2–3.5)
ALP SERPL-CCNC: 361 U/L (ref 50–136)
ALT SERPL-CCNC: 48 U/L (ref 12–65)
ANION GAP SERPL CALC-SCNC: 9 MMOL/L (ref 7–16)
APTT PPP: 36.9 SEC (ref 24.7–39.8)
APTT PPP: 40.9 SEC (ref 24.7–39.8)
APTT PPP: 42.4 SEC (ref 24.7–39.8)
AST SERPL-CCNC: 49 U/L (ref 15–37)
BASOPHILS # BLD: 0 K/UL (ref 0–0.2)
BASOPHILS NFR BLD: 0 % (ref 0–2)
BASOPHILS NFR BLD: 1 % (ref 0–2)
BASOPHILS NFR BLD: 1 % (ref 0–2)
BILIRUB SERPL-MCNC: 0.8 MG/DL (ref 0.2–1.1)
BUN SERPL-MCNC: 17 MG/DL (ref 8–23)
CALCIUM SERPL-MCNC: 9 MG/DL (ref 8.3–10.4)
CHLORIDE SERPL-SCNC: 103 MMOL/L (ref 98–107)
CHOLEST SERPL-MCNC: 117 MG/DL
CO2 SERPL-SCNC: 25 MMOL/L (ref 21–32)
CREAT SERPL-MCNC: 0.69 MG/DL (ref 0.8–1.5)
DIFFERENTIAL METHOD BLD: ABNORMAL
EOSINOPHIL # BLD: 0 K/UL (ref 0–0.8)
EOSINOPHIL # BLD: 0 K/UL (ref 0–0.8)
EOSINOPHIL # BLD: 0.1 K/UL (ref 0–0.8)
EOSINOPHIL NFR BLD: 1 % (ref 0.5–7.8)
ERYTHROCYTE [DISTWIDTH] IN BLOOD BY AUTOMATED COUNT: 15.1 % (ref 11.9–14.6)
ERYTHROCYTE [DISTWIDTH] IN BLOOD BY AUTOMATED COUNT: 15.1 % (ref 11.9–14.6)
ERYTHROCYTE [DISTWIDTH] IN BLOOD BY AUTOMATED COUNT: 15.2 % (ref 11.9–14.6)
ERYTHROCYTE [SEDIMENTATION RATE] IN BLOOD: 90 MM/HR (ref 0–20)
FERRITIN SERPL-MCNC: 336 NG/ML (ref 8–388)
GLOBULIN SER CALC-MCNC: 4.4 G/DL (ref 2.3–3.5)
GLUCOSE BLD STRIP.AUTO-MCNC: 119 MG/DL (ref 65–100)
GLUCOSE BLD STRIP.AUTO-MCNC: 119 MG/DL (ref 65–100)
GLUCOSE BLD STRIP.AUTO-MCNC: 125 MG/DL (ref 65–100)
GLUCOSE SERPL-MCNC: 106 MG/DL (ref 65–100)
HCT VFR BLD AUTO: 25.3 % (ref 41.1–50.3)
HCT VFR BLD AUTO: 25.4 % (ref 41.1–50.3)
HCT VFR BLD AUTO: 26.8 % (ref 41.1–50.3)
HDLC SERPL-MCNC: 28 MG/DL (ref 40–60)
HDLC SERPL: 4.2 {RATIO}
HGB BLD-MCNC: 7.3 G/DL (ref 13.6–17.2)
HGB BLD-MCNC: 7.6 G/DL (ref 13.6–17.2)
HGB BLD-MCNC: 7.9 G/DL (ref 13.6–17.2)
IMM GRANULOCYTES # BLD AUTO: 0 K/UL (ref 0–0.5)
IMM GRANULOCYTES NFR BLD AUTO: 0 % (ref 0–5)
IMM GRANULOCYTES NFR BLD AUTO: 0 % (ref 0–5)
IMM GRANULOCYTES NFR BLD AUTO: 1 % (ref 0–5)
INR PPP: 1.2
IRON SATN MFR SERPL: 8 %
IRON SERPL-MCNC: 25 UG/DL
LDLC SERPL CALC-MCNC: 69.6 MG/DL
LIPID PROFILE,FLP: ABNORMAL
LYMPHOCYTES # BLD: 0.3 K/UL (ref 0.5–4.6)
LYMPHOCYTES # BLD: 0.3 K/UL (ref 0.5–4.6)
LYMPHOCYTES # BLD: 0.4 K/UL (ref 0.5–4.6)
LYMPHOCYTES NFR BLD: 7 % (ref 13–44)
LYMPHOCYTES NFR BLD: 8 % (ref 13–44)
LYMPHOCYTES NFR BLD: 8 % (ref 13–44)
MAGNESIUM SERPL-MCNC: 2 MG/DL (ref 1.8–2.4)
MCH RBC QN AUTO: 25.6 PG (ref 26.1–32.9)
MCH RBC QN AUTO: 25.7 PG (ref 26.1–32.9)
MCH RBC QN AUTO: 26 PG (ref 26.1–32.9)
MCHC RBC AUTO-ENTMCNC: 28.7 G/DL (ref 31.4–35)
MCHC RBC AUTO-ENTMCNC: 29.5 G/DL (ref 31.4–35)
MCHC RBC AUTO-ENTMCNC: 30 G/DL (ref 31.4–35)
MCV RBC AUTO: 86.6 FL (ref 79.6–97.8)
MCV RBC AUTO: 86.7 FL (ref 79.6–97.8)
MCV RBC AUTO: 89.4 FL (ref 79.6–97.8)
MONOCYTES # BLD: 0.5 K/UL (ref 0.1–1.3)
MONOCYTES NFR BLD: 10 % (ref 4–12)
MONOCYTES NFR BLD: 12 % (ref 4–12)
MONOCYTES NFR BLD: 14 % (ref 4–12)
NEUTS SEG # BLD: 2.8 K/UL (ref 1.7–8.2)
NEUTS SEG # BLD: 2.9 K/UL (ref 1.7–8.2)
NEUTS SEG # BLD: 3.7 K/UL (ref 1.7–8.2)
NEUTS SEG NFR BLD: 77 % (ref 43–78)
NEUTS SEG NFR BLD: 77 % (ref 43–78)
NEUTS SEG NFR BLD: 81 % (ref 43–78)
NRBC # BLD: 0 K/UL (ref 0–0.2)
PLATELET # BLD AUTO: 218 K/UL (ref 150–450)
PLATELET # BLD AUTO: 238 K/UL (ref 150–450)
PLATELET # BLD AUTO: 250 K/UL (ref 150–450)
PMV BLD AUTO: 9.7 FL (ref 9.4–12.3)
PMV BLD AUTO: 9.8 FL (ref 9.4–12.3)
PMV BLD AUTO: 9.8 FL (ref 9.4–12.3)
POTASSIUM SERPL-SCNC: 4 MMOL/L (ref 3.5–5.1)
PROT SERPL-MCNC: 7 G/DL (ref 6.3–8.2)
PROTHROMBIN TIME: 15 SEC (ref 11.7–14.5)
RBC # BLD AUTO: 2.84 M/UL (ref 4.23–5.6)
RBC # BLD AUTO: 2.92 M/UL (ref 4.23–5.6)
RBC # BLD AUTO: 3.09 M/UL (ref 4.23–5.6)
SODIUM SERPL-SCNC: 137 MMOL/L (ref 136–145)
TIBC SERPL-MCNC: 306 UG/DL (ref 250–450)
TRIGL SERPL-MCNC: 97 MG/DL (ref 35–150)
TSH SERPL DL<=0.005 MIU/L-ACNC: 1.42 UIU/ML
VIT B12 SERPL-MCNC: >2000 PG/ML (ref 193–986)
VLDLC SERPL CALC-MCNC: 19.4 MG/DL (ref 6–23)
WBC # BLD AUTO: 3.6 K/UL (ref 4.3–11.1)
WBC # BLD AUTO: 3.7 K/UL (ref 4.3–11.1)
WBC # BLD AUTO: 4.5 K/UL (ref 4.3–11.1)

## 2019-05-20 PROCEDURE — 77030002916 HC SUT ETHLN J&J -A

## 2019-05-20 PROCEDURE — 97166 OT EVAL MOD COMPLEX 45 MIN: CPT

## 2019-05-20 PROCEDURE — 85730 THROMBOPLASTIN TIME PARTIAL: CPT

## 2019-05-20 PROCEDURE — 74011250637 HC RX REV CODE- 250/637: Performed by: INTERNAL MEDICINE

## 2019-05-20 PROCEDURE — 65270000029 HC RM PRIVATE

## 2019-05-20 PROCEDURE — C1751 CATH, INF, PER/CENT/MIDLINE: HCPCS

## 2019-05-20 PROCEDURE — 74011250636 HC RX REV CODE- 250/636: Performed by: INTERNAL MEDICINE

## 2019-05-20 PROCEDURE — 85025 COMPLETE CBC W/AUTO DIFF WBC: CPT

## 2019-05-20 PROCEDURE — 82962 GLUCOSE BLOOD TEST: CPT

## 2019-05-20 PROCEDURE — 80061 LIPID PANEL: CPT

## 2019-05-20 PROCEDURE — 36415 COLL VENOUS BLD VENIPUNCTURE: CPT

## 2019-05-20 PROCEDURE — 84443 ASSAY THYROID STIM HORMONE: CPT

## 2019-05-20 PROCEDURE — 36558 INSERT TUNNELED CV CATH: CPT

## 2019-05-20 PROCEDURE — 83735 ASSAY OF MAGNESIUM: CPT

## 2019-05-20 PROCEDURE — 74011000250 HC RX REV CODE- 250: Performed by: PHYSICIAN ASSISTANT

## 2019-05-20 PROCEDURE — 82607 VITAMIN B-12: CPT

## 2019-05-20 PROCEDURE — 74011250636 HC RX REV CODE- 250/636: Performed by: PHYSICIAN ASSISTANT

## 2019-05-20 PROCEDURE — 97530 THERAPEUTIC ACTIVITIES: CPT

## 2019-05-20 PROCEDURE — 85610 PROTHROMBIN TIME: CPT

## 2019-05-20 PROCEDURE — 74011250637 HC RX REV CODE- 250/637: Performed by: HOSPITALIST

## 2019-05-20 PROCEDURE — 77030003028 HC SUT VCRL J&J -A

## 2019-05-20 PROCEDURE — 80053 COMPREHEN METABOLIC PANEL: CPT

## 2019-05-20 PROCEDURE — 77030037400 HC ADH TISS HI VISC EXOFIN CHMP -B

## 2019-05-20 PROCEDURE — 85652 RBC SED RATE AUTOMATED: CPT

## 2019-05-20 PROCEDURE — 83540 ASSAY OF IRON: CPT

## 2019-05-20 PROCEDURE — 82728 ASSAY OF FERRITIN: CPT

## 2019-05-20 PROCEDURE — 99218 HC RM OBSERVATION: CPT

## 2019-05-20 PROCEDURE — 77030018719 HC DRSG PTCH ANTIMIC J&J -A

## 2019-05-20 RX ORDER — OLANZAPINE 2.5 MG/1
2.5 TABLET ORAL
Status: DISCONTINUED | OUTPATIENT
Start: 2019-05-20 | End: 2019-05-22 | Stop reason: HOSPADM

## 2019-05-20 RX ORDER — DEXTROSE, SODIUM CHLORIDE, AND POTASSIUM CHLORIDE 5; .45; .15 G/100ML; G/100ML; G/100ML
40 INJECTION INTRAVENOUS CONTINUOUS
Status: DISCONTINUED | OUTPATIENT
Start: 2019-05-20 | End: 2019-05-22 | Stop reason: HOSPADM

## 2019-05-20 RX ORDER — ZOLPIDEM TARTRATE 5 MG/1
5 TABLET ORAL
Status: DISCONTINUED | OUTPATIENT
Start: 2019-05-20 | End: 2019-05-22 | Stop reason: HOSPADM

## 2019-05-20 RX ORDER — AMITRIPTYLINE HYDROCHLORIDE 50 MG/1
100 TABLET, FILM COATED ORAL
Status: DISCONTINUED | OUTPATIENT
Start: 2019-05-20 | End: 2019-05-22 | Stop reason: HOSPADM

## 2019-05-20 RX ORDER — INSULIN LISPRO 100 [IU]/ML
INJECTION, SOLUTION INTRAVENOUS; SUBCUTANEOUS EVERY 6 HOURS
Status: DISCONTINUED | OUTPATIENT
Start: 2019-05-20 | End: 2019-05-20 | Stop reason: HOSPADM

## 2019-05-20 RX ORDER — INSULIN LISPRO 100 [IU]/ML
INJECTION, SOLUTION INTRAVENOUS; SUBCUTANEOUS
Status: DISCONTINUED | OUTPATIENT
Start: 2019-05-20 | End: 2019-05-22 | Stop reason: HOSPADM

## 2019-05-20 RX ORDER — HEPARIN SODIUM 5000 [USP'U]/100ML
18-36 INJECTION, SOLUTION INTRAVENOUS
Status: DISCONTINUED | OUTPATIENT
Start: 2019-05-20 | End: 2019-05-21

## 2019-05-20 RX ORDER — CLONAZEPAM 1 MG/1
4 TABLET ORAL
Status: DISCONTINUED | OUTPATIENT
Start: 2019-05-20 | End: 2019-05-22 | Stop reason: HOSPADM

## 2019-05-20 RX ORDER — CEFAZOLIN SODIUM/WATER 2 G/20 ML
2 SYRINGE (ML) INTRAVENOUS ONCE
Status: DISCONTINUED | OUTPATIENT
Start: 2019-05-20 | End: 2019-05-20

## 2019-05-20 RX ORDER — CIPROFLOXACIN 500 MG/1
250 TABLET ORAL EVERY 12 HOURS
Status: DISCONTINUED | OUTPATIENT
Start: 2019-05-20 | End: 2019-05-20 | Stop reason: HOSPADM

## 2019-05-20 RX ORDER — ZOLPIDEM TARTRATE 5 MG/1
5 TABLET ORAL
Status: DISCONTINUED | OUTPATIENT
Start: 2019-05-20 | End: 2019-05-20 | Stop reason: HOSPADM

## 2019-05-20 RX ORDER — ACETAMINOPHEN 325 MG/1
650 TABLET ORAL
Status: DISCONTINUED | OUTPATIENT
Start: 2019-05-20 | End: 2019-05-22 | Stop reason: HOSPADM

## 2019-05-20 RX ORDER — HEPARIN SODIUM 5000 [USP'U]/ML
80 INJECTION, SOLUTION INTRAVENOUS; SUBCUTANEOUS ONCE
Status: COMPLETED | OUTPATIENT
Start: 2019-05-20 | End: 2019-05-21

## 2019-05-20 RX ORDER — ONDANSETRON 2 MG/ML
4 INJECTION INTRAMUSCULAR; INTRAVENOUS
Status: DISCONTINUED | OUTPATIENT
Start: 2019-05-20 | End: 2019-05-22 | Stop reason: HOSPADM

## 2019-05-20 RX ORDER — OLANZAPINE 5 MG/1
5 TABLET ORAL DAILY
Status: DISCONTINUED | OUTPATIENT
Start: 2019-05-21 | End: 2019-05-22 | Stop reason: HOSPADM

## 2019-05-20 RX ORDER — HEPARIN SODIUM 200 [USP'U]/100ML
1000 INJECTION, SOLUTION INTRAVENOUS AS NEEDED
Status: DISCONTINUED | OUTPATIENT
Start: 2019-05-20 | End: 2019-05-21 | Stop reason: ALTCHOICE

## 2019-05-20 RX ORDER — SODIUM CHLORIDE 0.9 % (FLUSH) 0.9 %
5-40 SYRINGE (ML) INJECTION EVERY 8 HOURS
Status: DISCONTINUED | OUTPATIENT
Start: 2019-05-20 | End: 2019-05-22 | Stop reason: HOSPADM

## 2019-05-20 RX ORDER — AMITRIPTYLINE HYDROCHLORIDE 50 MG/1
100 TABLET, FILM COATED ORAL
Status: DISCONTINUED | OUTPATIENT
Start: 2019-05-20 | End: 2019-05-20 | Stop reason: HOSPADM

## 2019-05-20 RX ORDER — LIDOCAINE HYDROCHLORIDE 20 MG/ML
2-20 INJECTION, SOLUTION INFILTRATION; PERINEURAL ONCE
Status: DISCONTINUED | OUTPATIENT
Start: 2019-05-20 | End: 2019-05-20

## 2019-05-20 RX ORDER — OLANZAPINE 2.5 MG/1
5 TABLET ORAL DAILY
Status: DISCONTINUED | OUTPATIENT
Start: 2019-05-20 | End: 2019-05-20 | Stop reason: HOSPADM

## 2019-05-20 RX ORDER — HEPARIN SODIUM (PORCINE) LOCK FLUSH IV SOLN 100 UNIT/ML 100 UNIT/ML
500 SOLUTION INTRAVENOUS ONCE
Status: DISCONTINUED | OUTPATIENT
Start: 2019-05-20 | End: 2019-05-20

## 2019-05-20 RX ORDER — CIPROFLOXACIN 500 MG/1
250 TABLET ORAL EVERY 12 HOURS
Status: DISCONTINUED | OUTPATIENT
Start: 2019-05-20 | End: 2019-05-21

## 2019-05-20 RX ORDER — LEVOTHYROXINE SODIUM 125 UG/1
125 TABLET ORAL
Status: DISCONTINUED | OUTPATIENT
Start: 2019-05-21 | End: 2019-05-22 | Stop reason: HOSPADM

## 2019-05-20 RX ORDER — SODIUM CHLORIDE 0.9 % (FLUSH) 0.9 %
5-40 SYRINGE (ML) INJECTION AS NEEDED
Status: DISCONTINUED | OUTPATIENT
Start: 2019-05-20 | End: 2019-05-22 | Stop reason: HOSPADM

## 2019-05-20 RX ADMIN — AMITRIPTYLINE HYDROCHLORIDE 100 MG: 50 TABLET, FILM COATED ORAL at 21:50

## 2019-05-20 RX ADMIN — CLONAZEPAM 4 MG: 1 TABLET ORAL at 21:50

## 2019-05-20 RX ADMIN — LEVOTHYROXINE SODIUM 125 MCG: 125 TABLET ORAL at 05:26

## 2019-05-20 RX ADMIN — DEXTROSE MONOHYDRATE, SODIUM CHLORIDE, AND POTASSIUM CHLORIDE 100 ML/HR: 50; 4.5; 1.49 INJECTION, SOLUTION INTRAVENOUS at 18:26

## 2019-05-20 RX ADMIN — CIPROFLOXACIN HYDROCHLORIDE 250 MG: 500 TABLET, FILM COATED ORAL at 21:50

## 2019-05-20 RX ADMIN — Medication 10 ML: at 05:53

## 2019-05-20 RX ADMIN — DEXTROSE MONOHYDRATE, SODIUM CHLORIDE, AND POTASSIUM CHLORIDE 100 ML/HR: 50; 4.5; 1.49 INJECTION, SOLUTION INTRAVENOUS at 05:53

## 2019-05-20 RX ADMIN — Medication 10 ML: at 21:52

## 2019-05-20 RX ADMIN — ZOLPIDEM TARTRATE 5 MG: 5 TABLET ORAL at 21:51

## 2019-05-20 RX ADMIN — OLANZAPINE 2.5 MG: 2.5 TABLET, FILM COATED ORAL at 21:51

## 2019-05-20 RX ADMIN — LIDOCAINE HYDROCHLORIDE 120 MG: 10; .005 INJECTION, SOLUTION EPIDURAL; INFILTRATION; INTRACAUDAL; PERINEURAL at 14:29

## 2019-05-20 RX ADMIN — OLANZAPINE 5 MG: 2.5 TABLET, FILM COATED ORAL at 02:04

## 2019-05-20 RX ADMIN — AMITRIPTYLINE HYDROCHLORIDE 100 MG: 50 TABLET, FILM COATED ORAL at 02:04

## 2019-05-20 RX ADMIN — HEPARIN SODIUM 2000 UNITS: 200 INJECTION, SOLUTION INTRAVENOUS at 14:28

## 2019-05-20 NOTE — PROGRESS NOTES
Hospitalist Progress Note Admit Date:  2019 11:26 AM  
Name:  Barbara Brewer Age:  74 y.o. 
:  1945 MRN:  [id number] PCP:  Jose Funk DO Treatment Team: Attending Provider: Hilario Beltran DO; Surgeon: Venus Jaffe MD; Utilization Review: Alesia Randall Subjective:  
 
Patient history was obtained from the ER provider prior to seeing the patient. 47-year-old male presents from [facility] with complaints of this dislodged intravenous catheter Patient states that he woke up this morning with a trickle blood on his chest 
He is unsure how the central line became dislodged 
  
Patient reports that he still getting TPN through the catheter.  While his fistula is healing. 
  
IJ line was placed, 2019 by Dr. Torie Moreau 
  
Patient denies any chest pain or shortness of breath.  No recent fever, vomiting or diarrhea Patient states he is currently taking sips of liquids only 
  
The history is provided by the patient, the EMS personnel and the nursing home.  
Other  
This is a new problem. The current episode started 3 to 5 hours ago. The problem occurs constantly. Pertinent negatives include no chest pain, no abdominal pain, no headaches and no shortness of breath. Nothing aggravates the symptoms. Nothing relieves the symptoms. He has tried nothing for the symptoms.  
  
19: 
Cc: loss of iv access for tpn, TPN depend. Unfortunate 74 y.o. Male diagnosed rectal cancer >5yr ago and is chronic brito and tpn dependant. Recent long hospital stay has had recurrent sbo, abcesses, and recent enterocutaneous fistula with ileostomy. He also has colostomy. He was discharged 5/3 by general surgery to [facility]. Has done ok but lost his nontunneled right IJ central venous access last pm. To er and not able to re-insert.  He needs a tunneled access as he has been recently told by surgery sips of water for meds only and will need prolonged tpn. I spoke with general surgery and I will admit to observation for them and try arrange IR for tunneled access hopeful 5/20/19 . He may have peripheral ivf d5 1/2nss with kcl as temporizing measure. He is usure of brito catheter change date and will change brito cath and check ua --- dark concentrated urine but clear. 5/21/19:  
Cc: weakness Pt back from tunneled central vein catheter IR at [facility], unfortunately found to have RIGHT IJ ACUTE DVT per report. I discussed with surgical service and no known past major bleeding episodes. He has microscopic hematuria in setting of presumed uti. Will need iv heparin drip and close monitoring. He will have type and screen as his hb is low and will need prbc if hb <7. He will be converted to inpt status. Objective:  
 
Patient Vitals for the past 24 hrs: 
 Temp Pulse Resp BP SpO2  
05/20/19 0807 98.1 °F (36.7 °C) 74 16 127/74 100 % 05/20/19 0307 98.5 °F (36.9 °C) 86 14 108/66 95 % 05/19/19 2334 97.4 °F (36.3 °C) 77 15 95/58 96 % 05/19/19 1926     95 % 05/19/19 1923 98.5 °F (36.9 °C) 82 15 101/61   
05/19/19 1710 97.9 °F (36.6 °C) 88 18 129/80 98 % Oxygen Therapy O2 Sat (%): 100 % (05/20/19 0807) Pulse via Oximetry: 93 beats per minute (05/19/19 1516) O2 Device: Room air (05/20/19 0700) Intake/Output Summary (Last 24 hours) at 5/20/2019 1656 Last data filed at 5/20/2019 5033 Gross per 24 hour Intake 1492 ml Output 560 ml Net 932 ml REVIEW OF SYSTEMS: Comprehensive ROS performed and negative except as stated in HPI. Physical Examination: 
General:    Lean cachectic Head:  Normocephalic, atraumatic, nares patent--right ij site erythema Eyes:  Extraocular movements intact, normal sclera CV:   RRR. No  Murmurs, clicks, or gallops, distal pulses intact Lungs:   Unlabored, no cyanosis, no wheeze Abdomen:   Colostomy, ileostomy, chronic brito Extremities: Warm and dry. cachectic Neuro:  No gross focal deficits, no tremor Psych:  anxious Data Review: 
I have reviewed all labs, meds, telemetry events, and studies from the last 24 hours. Recent Results (from the past 24 hour(s)) GLUCOSE, POC Collection Time: 05/19/19  5:23 PM  
Result Value Ref Range Glucose (POC) 109 (H) 65 - 100 mg/dL URINALYSIS W/ RFLX MICROSCOPIC Collection Time: 05/19/19  7:46 PM  
Result Value Ref Range Color RED Appearance TURBID Specific gravity 1.017 1.001 - 1.023    
 pH (UA) 5.5 5.0 - 9.0 Protein 100 (A) NEG mg/dL Glucose NEGATIVE  mg/dL Ketone TRACE (A) NEG mg/dL Bilirubin MODERATE (A) NEG Blood LARGE (A) NEG Urobilinogen 1.0 0.2 - 1.0 EU/dL Nitrites NEGATIVE  NEG Leukocyte Esterase MODERATE (A) NEG    
 WBC >100 0 /hpf  
 RBC >100 0 /hpf Epithelial cells 0-3 0 /hpf Bacteria 1+ (H) 0 /hpf Casts 10-20 0 /lpf Other observations RESULTS VERIFIED MANUALLY MRSA SCREEN - PCR (NASAL) Collection Time: 05/19/19  8:22 PM  
Result Value Ref Range Special Requests: NO SPECIAL REQUESTS Culture result:     
  SA target not detected. A MRSA NEGATIVE, SA NEGATIVE test result does not preclude MRSA or SA nasal colonization. GLUCOSE, POC Collection Time: 05/19/19 10:03 PM  
Result Value Ref Range Glucose (POC) 104 (H) 65 - 100 mg/dL PROTHROMBIN TIME + INR Collection Time: 05/20/19  5:03 AM  
Result Value Ref Range Prothrombin time 15.0 (H) 11.7 - 14.5 sec INR 1.2 PTT Collection Time: 05/20/19  5:03 AM  
Result Value Ref Range aPTT 36.9 24.7 - 39.8 SEC METABOLIC PANEL, COMPREHENSIVE Collection Time: 05/20/19  5:03 AM  
Result Value Ref Range Sodium 137 136 - 145 mmol/L Potassium 4.0 3.5 - 5.1 mmol/L Chloride 103 98 - 107 mmol/L  
 CO2 25 21 - 32 mmol/L Anion gap 9 7 - 16 mmol/L Glucose 106 (H) 65 - 100 mg/dL  BUN 17 8 - 23 MG/DL  
 Creatinine 0.69 (L) 0.8 - 1.5 MG/DL  
 GFR est AA >60 >60 ml/min/1.73m2 GFR est non-AA >60 >60 ml/min/1.73m2 Calcium 9.0 8.3 - 10.4 MG/DL Bilirubin, total 0.8 0.2 - 1.1 MG/DL  
 ALT (SGPT) 48 12 - 65 U/L  
 AST (SGOT) 49 (H) 15 - 37 U/L Alk. phosphatase 361 (H) 50 - 136 U/L Protein, total 7.0 6.3 - 8.2 g/dL Albumin 2.6 (L) 3.2 - 4.6 g/dL Globulin 4.4 (H) 2.3 - 3.5 g/dL A-G Ratio 0.6 (L) 1.2 - 3.5 LIPID PANEL Collection Time: 05/20/19  5:03 AM  
Result Value Ref Range LIPID PROFILE Cholesterol, total 117 MG/DL Triglyceride 97 35 - 150 MG/DL  
 HDL Cholesterol 28 (L) 40 - 60 MG/DL  
 LDL, calculated 69.6 <100 MG/DL VLDL, calculated 19.4 6.0 - 23.0 MG/DL  
 CHOL/HDL Ratio 4.2 <200 CBC WITH AUTOMATED DIFF Collection Time: 05/20/19  5:03 AM  
Result Value Ref Range WBC 3.7 (L) 4.3 - 11.1 K/uL  
 RBC 2.84 (L) 4.23 - 5.6 M/uL HGB 7.3 (L) 13.6 - 17.2 g/dL HCT 25.4 (L) 41.1 - 50.3 % MCV 89.4 79.6 - 97.8 FL  
 MCH 25.7 (L) 26.1 - 32.9 PG  
 MCHC 28.7 (L) 31.4 - 35.0 g/dL  
 RDW 15.2 (H) 11.9 - 14.6 % PLATELET 692 558 - 165 K/uL MPV 9.8 9.4 - 12.3 FL ABSOLUTE NRBC 0.00 0.0 - 0.2 K/uL  
 DF AUTOMATED NEUTROPHILS 77 43 - 78 % LYMPHOCYTES 8 (L) 13 - 44 % MONOCYTES 12 4.0 - 12.0 % EOSINOPHILS 1 0.5 - 7.8 % BASOPHILS 1 0.0 - 2.0 % IMMATURE GRANULOCYTES 0 0.0 - 5.0 %  
 ABS. NEUTROPHILS 2.9 1.7 - 8.2 K/UL  
 ABS. LYMPHOCYTES 0.3 (L) 0.5 - 4.6 K/UL  
 ABS. MONOCYTES 0.5 0.1 - 1.3 K/UL  
 ABS. EOSINOPHILS 0.1 0.0 - 0.8 K/UL  
 ABS. BASOPHILS 0.0 0.0 - 0.2 K/UL  
 ABS. IMM. GRANS. 0.0 0.0 - 0.5 K/UL MAGNESIUM Collection Time: 05/20/19  5:03 AM  
Result Value Ref Range Magnesium 2.0 1.8 - 2.4 mg/dL TSH 3RD GENERATION Collection Time: 05/20/19  5:03 AM  
Result Value Ref Range TSH 1.420 uIU/mL FERRITIN Collection Time: 05/20/19  5:03 AM  
Result Value Ref Range  Ferritin 336 8 - 388 NG/ML  
TRANSFERRIN SATURATION  
 Collection Time: 05/20/19  5:03 AM  
Result Value Ref Range Iron 25 ug/dL TIBC 306 250 - 450 ug/dL Transferrin Saturation 8 % GLUCOSE, POC Collection Time: 05/20/19  7:56 AM  
Result Value Ref Range Glucose (POC) 125 (H) 65 - 100 mg/dL SED RATE, AUTOMATED Collection Time: 05/20/19  9:39 AM  
Result Value Ref Range Sed rate, automated 90 (H) 0 - 20 mm/hr GLUCOSE, POC Collection Time: 05/20/19 10:58 AM  
Result Value Ref Range Glucose (POC) 119 (H) 65 - 100 mg/dL All Micro Results Procedure Component Value Units Date/Time CULTURE, URINE [[id number]] Order Status:  Sent Specimen:  Urine from Bishop Specimen CULTURE, URINE [[id number]] Order Status:  Canceled Specimen:  Urine from Bishop Specimen MRSA SCREEN - PCR (NASAL) [[id number]] Collected:  05/19/19 2022 Order Status:  Completed Specimen:  Nasal Updated:  05/19/19 2203 Special Requests: NO SPECIAL REQUESTS Culture result:    
  SA target not detected. A MRSA NEGATIVE, SA NEGATIVE test result does not preclude MRSA or SA nasal colonization. Current Meds: 
Current Facility-Administered Medications Medication Dose Route Frequency  amitriptyline (ELAVIL) tablet 100 mg  100 mg Oral QHS  ciprofloxacin HCl (CIPRO) tablet 250 mg  250 mg Oral Q12H  clonazePAM (KlonoPIN) tablet 4 mg  4 mg Oral QHS  insulin lispro (HUMALOG) injection   SubCUTAneous AC&HS  sodium chloride (NS) flush 5-40 mL  5-40 mL IntraVENous PRN  
 acetaminophen (TYLENOL) tablet 650 mg  650 mg Oral Q4H PRN  
 ondansetron (ZOFRAN) injection 4 mg  4 mg IntraVENous Q4H PRN  
 dextrose 5% - 0.45% NaCl with KCl 20 mEq/L infusion  100 mL/hr IntraVENous CONTINUOUS  
 [START ON 5/21/2019] zolpidem (AMBIEN) tablet 5 mg  5 mg Oral QHS  sodium chloride (NS) flush 5-40 mL  5-40 mL IntraVENous Q8H  
 [START ON 5/21/2019] OLANZapine (ZyPREXA) tablet 5 mg  5 mg Oral DAILY  OLANZapine (ZyPREXA) tablet 2.5 mg  2.5 mg Oral QHS  [START ON 5/21/2019] levothyroxine (SYNTHROID) tablet 125 mcg  125 mcg Oral 6am  
 
Facility-Administered Medications Ordered in Other Encounters Medication Dose Route Frequency  heparin (PF) 2 units/ml in NS infusion 2,000 Units  1,000 mL Irrigation PRN Diet: DIET NPO Other Studies (last 24 hours): 
Ir Insert Tunl Cvc W/o Port Over 5 [address] Result Date: 5/20/2019 Title: Tunneled central venous catheter placement. Ultrasound guidance for vascular access. Indication:  Malnutrition, needs parenteral nutrition Consent: Informed written and oral consent was obtained from the patient after explanation of benefits and risks (including, but not limited to: Infection, hemorrhage, pneumothorax). The patient's questions were answered to their satisfaction. The patient stated understanding and requested that we proceed. Procedure:  Maximal sterile barrier technique (including:  cap, mask, sterile gown, sterile gloves, sterile sheet, hand hygiene, chlorhexidene for antiseptic skin preparation, sterile ultrasound techniques including sterile gel and sterile ultrasound probe cover) was used. A local field block with lidocaine was achieved. Ultrasound evaluation of all potential access sites was performed due to lack of a palpable vein. No veins were palpable due to overlying adipose tissue. Using real-time ultrasound guidance, with appropriate image recording and visualization of vascular needle entry, the patent left internal jugular vein was accessed using micropuncture technique. Using fluoroscopy, a peel-away sheath was placed over a wire. A subcutaneous tunnel was anesthetized on the left chest wall.   A 5 [place] single lumen tunneled central venous catheter was then brought through the subcutaneous tunnel and passed down the peel-away sheath positioning the tip in the Right Atrium. A permanent radiograph image was recorded. All lumens aspirated easily and were filled with heparinized saline. The venotomy incision was closed with absorbable suture. The catheter was secured with nonabsorbable suture. . Complications: None. Radiation Exposure Indices: Reference Air Kerma (Ka,r) = 4 mGy Dose Area Product/Kerma Area Product (DAP/JEAN PIERRE/PKA) = 100  cGy-cm2 Fluoroscopy Exposure Time = 36 seconds Fluorographic Images = Number Contrast: 0. Medications: None Findings:  Patent left internal jugular vein. Catheter tip in the mid right atrium. Impression: Technically successful tunneled central venous catheter placement. Note is made of some acute appearing thrombus in the right internal jugular vein. Clinical significance is uncertain. Plan: Recover for 1 hour. Catheter is ready for use. Suture should be removed in 2 weeks. Assessment and Plan:  
 
Hospital Problems as of 5/20/2019 Date Reviewed: 12/17/2018 Codes Class Noted - Resolved POA Acute deep vein thrombosis (DVT) of non-extremity vein ICD-10-CM: I82.90 ICD-9-CM: 453.9  5/20/2019 - Present Unknown Malnutrition ([facility] [address] ICD-10-CM: E46 
ICD-9-CM: 263.9  5/19/2019 - Present Unknown On total parenteral nutrition (TPN) ICD-10-CM: Z78.9 ICD-9-CM: V49.89  5/19/2019 - Present Unknown Colostomy care [facility]) ICD-10-CM: Z43.3 ICD-9-CM: V55.3  5/19/2019 - Present Unknown Chronic indwelling Bishop catheter ICD-10-CM: Z96.0 ICD-9-CM: V45.89  5/19/2019 - Present Unknown Moderate protein-calorie malnutrition ([facility] [address] ICD-10-CM: E44.0 ICD-9-CM: 263.0  5/19/2019 - Present Unknown Enterocutaneous fistula ICD-10-CM: M64.3 ICD-9-CM: 569.81  4/29/2019 - Present Yes Attention to ileostomy [facility]) ICD-10-CM: H29.8 ICD-9-CM: V55.2  8/20/2014 - Present Yes * (Principal) Dehydration ICD-10-CM: E86.0 ICD-9-CM: 276.51  4/17/2014 - Present Yes  Rectal cancer ([facility] [address] ICD-10-CM: C20 
 ICD-9-CM: 154.1  10/22/2013 - Present Yes A/P:   
--heparin drip for right ij dvt if tolerates -- oral anticoag and back to rehab  
--restart tpn in am - now has tunneled left ij catheter  
--surgical consult pending re: hx 
--dehydration continue current ivf 
--home meds 
--quinolone for uti urine culture pending. Signed: 
Héctor KAUFMAN

## 2019-05-20 NOTE — PROGRESS NOTES
Shift assessment complete. Patient A&O x 4. Respirations present, even and unlabored. Breath sounds clear. Patient on room air. Heart sounds regular. Bowel sounds active in all 4 quadrants. Abdomen soft and non tender. IV infusing without difficulty. SCD's in place. Bishop noted. Patient needs met. No distress noted. Bed low and locked. Call light within reach. Will continue to monitor hourly during shift.

## 2019-05-20 NOTE — PROGRESS NOTES
TRANSFER - OUT REPORT:    Verbal report given to BEATA Martin on [address]  being transferred to IR prep room 2 for routine post - op       Report consisted of patients Situation, Background, Assessment and   Recommendations(SBAR). Information from the following report(s) SBAR, Kardex, Procedure Summary and MAR was reviewed with the receiving nurse. Lines:   Single Lumen Venous Catheter Bard Power Line 05/20/19 Left Internal jugular (Active)        Opportunity for questions and clarification was provided. Patient awaiting transport back to [address] [place] [address].

## 2019-05-20 NOTE — PROCEDURES
Department of Interventional Radiology  [phone]        Interventional Radiology Brief Procedure Note    Patient: Zoila Pendleton MRN: [id number]  SSN: [id number]    YOB: 1945  Age: 74 y.o. Sex: male      Date of Procedure: 5/20/2019    Pre-Procedure Diagnosis: Malnutrition    Post-Procedure Diagnosis: SAME    Procedure(s): Tunneled Central Venous Catheter    Brief Description of Procedure: as above    Performed By: Aftab Frank MD     Assistants: None    Anesthesia:Lidocaine    Estimated Blood Loss: Less than 10ml    Specimens:  None    Implants:  Tunnelled Central Venous Catheter    Findings: Patent left IJ.   Right IJ is occluded with acute appearing thrombus    Complications: None    Recommendations: OK to use     Follow Up: as needed    Signed By: Aftab Frank MD     May 20, 2019

## 2019-05-20 NOTE — ROUTINE PROCESS
PT attempted to assess pt earlier today but pt was DT for a procedure. PT will follow up 5/21/19 as indicated.  Melquiades Diallo, PT, STELLA

## 2019-05-20 NOTE — PROGRESS NOTES
Patient has [re]admitted to hospital from [facility] (there for STR) with a diagnosis of Malnutrition. RRAT - 13 
 - Long LOS during previous admission 3/30 through 5/3 Current Hospital Problem List: 
 - Rectal Cancer 
 - dehydration - Attention to ileostomy - Enterocutaneous fistula - Chronic Bishop Catheter - TPN - moderate protein-calorie Malnutrition Plan - follow with IP CM for discharge Route communication to SNF Coordinator

## 2019-05-20 NOTE — CONSULTS
[address]  [address] [facility], [address] [place] [address], 9[address] [facility]       History and Physical/Surgical Consult   Yaneth Marroquin    Admit date: 2019    MRN: [id number]     : 1945     Age: 74 y.o.          2019 5:48 PM    Subjective/HPI:   This patient is a 74 y.o. seen and evaluated at the request of the hospitalist.  Mr. Nabeel Cruz is well known to our practice. He was discharged in early May after a prolonged hospital stay after SBO with complex ex lap with enterotomies and later development of enterocutaneous fistula. He was discharged to rehab with fistula being managed by colostomy appliance and TPN. He had a R IJ and recently lost access and was admitted by the hospitalist team overnight. IR placed L SC tunneled catheter today but found occluded R IJ with thrombus. He otherwise denies any changes such as N/V/F. Surgery is consulted to assume admit and provide surgical input. .     Review of Systems  A comprehensive review of systems was negative except for that written in the HPI.   Past Medical History:   Diagnosis Date    Acute deep vein thrombosis (DVT) of non-extremity vein 2019    Family history of malignant neoplasm of gastrointestinal tract     mother colon/ovarian cancer 67    Fecal incontinence     LAR syndrome    Former cigarette smoker     History of rectal cancer     Hypothyroid     stable w/med    Infection and inflammatory reaction due to other internal prosthetic devices, implants and grafts, subsequent encounter     abd wound/mesh colostomy    Insomnia     takes meds    Osteoarthritis, hand     Personal history of colonic polyps 9/2013    x 1    Personal history of malignant neoplasm of rectum, rectosigmoid junction, and anus 2013    Psychiatric disorder     anxiety      Past Surgical History:   Procedure Laterality Date    COLONOSCOPY N/A 2018    COLONOSCOPY / BMI=21 performed by Joon [facility]les [facility] ENDOSCOPY    COLONOSCOPY THRU STOMA  2018         ENDOSCOPY, COLON, DIAGNOSTIC  last 2/3/15    Emily--no polyps--3 year recall    HX COLECTOMY  2013    Emily--lap LAR with coloproctostomy, mobilization splenic flexure, diverting loop ileostomy    HX COLECTOMY Right 2014    for leak    HX COLOSTOMY  16    HX GI  2016    Sacral nerve stimlator lead trial (unsucessful) and removal    HX HERNIA REPAIR  3/2015, 2016    HX HERNIA REPAIR      and Colostomy in May    HX OTHER SURGICAL  10/7/2013    Emily---endorectal us    HX OTHER SURGICAL Bilateral     cataracts      HX POLYPECTOMY      HX VASCULAR ACCESS Left     single lumen port/subsequently removed    IR INSERT TUNL CVC W/O PORT OVER 5 YR  2019      No Known Allergies   Social History     Tobacco Use    Smoking status: Former Smoker     Last attempt to quit: 1963     Years since quittin.5    Smokeless tobacco: Never Used   Substance Use Topics    Alcohol use: Not Currently     Alcohol/week: 7.0 oz     Types: 14 Cans of beer per week     Comment: advised stop drinking given issues      Social History     Social History Narrative    Not on file     Family History   Problem Relation Age of Onset    Cancer Mother         colon and ovarian    Cancer Brother         prostate    Alcohol abuse Brother     Cancer Maternal Grandmother         bone    Alcohol abuse Father     Alcohol abuse Sister     Stroke Paternal Grandfather       Prior to Admission Medications   Prescriptions Last Dose Informant Patient Reported? Taking? Cholecalciferol, Vitamin D3, (VITAMIN D3) 2,000 unit cap capsule 2019 at Unknown time  No Yes   Sig: Take 2,000 Units by mouth two (2) times a day. OLANZapine (ZYPREXA) 2.5 mg tablet 2019 at Unknown time  Yes Yes   Sig: Take 2.5 mg by mouth nightly.  Indications: TAKES 3 PILLS qHS   amitriptyline (ELAVIL) 100 mg tablet 2019 at Unknown time  Yes Yes   Sig: Take 100 mg by mouth nightly. Dr Christine Terry psych  Indications: takes for sleep   clonazePAM (KLONOPIN) 2 mg tablet 5/18/2019 at Unknown time  No Yes   Sig: Take 2 Tabs by mouth nightly. Max Daily Amount: 4 mg. Indications: takes for sleep   Patient taking differently: Take 2 mg by mouth nightly.  Dr Christine Terry psych  Indications: takes for sleep-TAKES 3PILLS   levothyroxine (SYNTHROID) 125 mcg tablet 5/19/2019 at Unknown time  No Yes   Sig: TAKE ONE TABLET BY MOUTH ONE TIME DAILY BEFORE BREAKFAST      Facility-Administered Medications: None     Current Facility-Administered Medications   Medication Dose Route Frequency    amitriptyline (ELAVIL) tablet 100 mg  100 mg Oral QHS    ciprofloxacin HCl (CIPRO) tablet 250 mg  250 mg Oral Q12H    clonazePAM (KlonoPIN) tablet 4 mg  4 mg Oral QHS    insulin lispro (HUMALOG) injection   SubCUTAneous AC&HS    sodium chloride (NS) flush 5-40 mL  5-40 mL IntraVENous PRN    acetaminophen (TYLENOL) tablet 650 mg  650 mg Oral Q4H PRN    ondansetron (ZOFRAN) injection 4 mg  4 mg IntraVENous Q4H PRN    dextrose 5% - 0.45% NaCl with KCl 20 mEq/L infusion  100 mL/hr IntraVENous CONTINUOUS    zolpidem (AMBIEN) tablet 5 mg  5 mg Oral QHS    sodium chloride (NS) flush 5-40 mL  5-40 mL IntraVENous Q8H    [START ON 5/21/2019] OLANZapine (ZyPREXA) tablet 5 mg  5 mg Oral DAILY    OLANZapine (ZyPREXA) tablet 2.5 mg  2.5 mg Oral QHS    [START ON 5/21/2019] levothyroxine (SYNTHROID) tablet 125 mcg  125 mcg Oral 6am     Facility-Administered Medications Ordered in Other Encounters   Medication Dose Route Frequency    heparin (PF) 2 units/ml in NS infusion 2,000 Units  1,000 mL Irrigation PRN     Objective:     Vitals:    05/19/19 1926 05/19/19 2334 05/20/19 0307 05/20/19 0807   BP:  95/58 108/66 127/74   Pulse:  77 86 74   Resp:  15 14 16   Temp:  97.4 °F (36.3 °C) 98.5 °F (36.9 °C) 98.1 °F (36.7 °C)   SpO2: 95% 96% 95% 100%   Weight:       Height:         05/20 0701 - 05/20 1900  In: 1578 [I.V.:1492]  Out: -   05/18 1901 - 05/20 0700  In: -   Out: 12 [Urine:560]  Physical Exam:   Gen- the patient is well developed and in no acute distress  HEENT- PERRL, EOMI, no scleral icterus       nose without alar flaring or epistaxis                  oral muscosa moist without cyanosis  Neck- no JVD or retractions  Lungs- resp even/unlab   Heart- RRR   Abd- soft. Colostomy and EC fistula appliance intact both with brown stool. Ext- warm without cyanosis. There is no lower leg edema. Skin- no jaundice or rashes  Neuro- alert and oriented x 3. No gross sensorimotor deficits are present.      Data Review   Recent Labs     05/20/19  1712 05/20/19  0503 05/19/19  1615   WBC 4.5 3.7* 3.9*   HGB 7.9* 7.3* 8.5*   HCT 26.8* 25.4* 28.3*    250 210     Recent Labs     05/20/19  0503 05/19/19  1615    138   K 4.0 4.6    103   CO2 25 26   * 74   BUN 17 21   CREA 0.69* 0.71*   MG 2.0 1.9   PHOS  --  3.6   INR 1.2  --        Assessment:     Hospital Problems  Date Reviewed: 12/17/2018          Codes Class Noted POA    * (Principal) Acute deep vein thrombosis (DVT) of non-extremity vein ICD-10-CM: I82.90  ICD-9-CM: 453.9  5/20/2019 Unknown        DVT (deep vein thrombosis) in pregnancy [facility]) ICD-10-CM: O22.30, I82.409  ICD-9-CM: 671.30  5/20/2019 Unknown        Malnutrition ([facility] ICD-10-CM: E46  ICD-9-CM: 263.9  5/19/2019 Unknown        On total parenteral nutrition (TPN) ICD-10-CM: Z78.9  ICD-9-CM: V49.89  5/19/2019 Unknown        Colostomy care ([facility] ICD-10-CM: Z43.3  ICD-9-CM: V55.3  5/19/2019 Unknown        Chronic indwelling Bishop catheter ICD-10-CM: Z96.0  ICD-9-CM: V45.89  5/19/2019 Unknown        Moderate protein-calorie malnutrition ([facility] ICD-10-CM: E44.0  ICD-9-CM: 263.0  5/19/2019 Unknown        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  ICD-9-CM: 569.81  4/29/2019 Yes        Attention to ileostomy ([facility] ICD-10-CM: Z43.2  ICD-9-CM: V55.2  8/20/2014 Yes        Dehydration ICD-10-CM: E86.0  ICD-9-CM: 276.51  4/17/2014 Yes        Rectal cancer ([facility] ICD-10-CM: C20  ICD-9-CM: 154.1  10/22/2013 Yes            Plan:   No surgical issues. OK with treatment of IJ thrombus  Transfer to rehab when stable  Dr. Alejandra Bosch to return tomorrow.   --    Guadelupe Pallas DePriest, DO

## 2019-05-20 NOTE — PROGRESS NOTES
Nutrition: 
Nutrition Consult for TPN Management. (Dr. Judah Cunningham) Assessment: Anthropometrics: Ht - 5'8.5\", wgt - 54.4 kg (stated wgt), BMI 18 c/w underweight in a person >72years of age, edema - none. Macronutrient Needs: 
Estimated calorie needs - 5786-4188 jose/day (25-30 jose/kg/day) Estimated protein needs - 66-83 gm pro/day (1.2-1.5 gm pro/kgIBW/day) (GFR >60 ml/min) CHO limit/day - 317 gm CHO/day (4 mg/kgIBW/min/day) Fluid/day - 1.4-1.7 liters/day (1 ml/jose/day) Intake/Comparative Standards: The has dextrose-containing IVF infusing @ 100 ml/hr which contributes 408 calories/day (25% calorie needs). Pertinent Labs: 
 BMP, magnesium, phosphorus and TG unremarkable. Pertinent Medications: 
 Cipro, SSI coverage; IVF - D51/2 NS with 20 KCl/L @ 100 ml/hr. GI Function/Activity: 
 EC fistula. Diet: 
 NPO. IV Access: 
 Peripheral. Currently in IR @ [facility] for placement of tunneled central CVC. He will be transferred back to [facility]. Food/Nutrition History: 
 74year old gentleman admitted with a displaced right IJ CVC. He is having a tunneled CVC placed for long-term TPN. Diagnosis (Nutrition): Altered GI function related to EC fistula as evidenced by NPO status and the use of long-term TPN. Intervention: 
Meals and Snacks: NPO. Parenteral Nutrition/IV Fluid: 1) PPN with daily lipids to start this evening since central access has not been established before TPN order deadline - 1112 calories (67% calories needs), 77 gn protein (100% protein needs), 90 gm CHO/day (does not exceed daily max CHO limit) in 2050 ml volume/day. 2) Additives/liter: 50 sodium (chloride), 40 potassium (10 phosphate, 30 chloride), 5 magnesium, 10 phosphorus (potassium). 3) MVI and MTE/bag. Mineral Supplement Therapy: Nutrition Support Orders for Electrolyte Management Replacement are activated and placed on the MAR.  
Coordination of Nutrition Care by a Nutrition Professional:Collaboration with Roya Vela, pharmacist. 
 Discharge Nutrition Plan: TPN. Fern Christopher. Mallika Shah 
[phone]

## 2019-05-20 NOTE — PROGRESS NOTES
Shift assessment complete. Patient A&O x 4 Respirations present, even and unlabored. Breath sounds clear. Patient on room air. Heart sounds regular. Bowel sounds active in all 4 quadrants. Abdomen soft and non tender. IV infusing without difficulty. SCD's in place/Bishop present . Bed low and locked. Call light within reach.

## 2019-05-20 NOTE — PROGRESS NOTES
TRANSFER - OUT REPORT:    Verbal report given to BEATA Kirby on Nely Points  being transferred to [address] [place] [address], 3rd floor for routine post - op       Report consisted of patients Situation, Background, Assessment and   Recommendations(SBAR). Information from the following report(s) SBAR, Kardex, Procedure Summary and MAR was reviewed with the receiving nurse. Lines:   Single Lumen Venous Catheter Bard Power Line 05/20/19 Left Internal jugular (Active)        Opportunity for questions and clarification was provided.

## 2019-05-20 NOTE — PROGRESS NOTES
TRANSFER - IN REPORT:    Verbal report received from Mirta RN (name) on Ara Molt  being received from [address] (unit) for ordered procedure      Report consisted of patients Situation, Background, Assessment and   Recommendations(SBAR). Information from the following report(s) SBAR, Kardex, Procedure Summary, MAR and Recent Results was reviewed with the receiving nurse. Opportunity for questions and clarification was provided. Assessment completed upon patients arrival to unit and care assumed. Patient to be transported at [phone] by ambulance service to [facility] for procedure.

## 2019-05-20 NOTE — PROGRESS NOTES
Problem: Self Care Deficits Care Plan (Adult) Goal: *Acute Goals and Plan of Care (Insert Text) Description Patient will complete lower body dressing with supervision to increase self care independence. Patient will complete bathing with supervision to increase self care independence. Patient will improve static standing at in shower for 8 minutes to improve independence with transfers and self cares. Patient will tolerate 38 minutes of OT treatment with self incorporated rest breaks to increase activity tolerance to enhance participation in hobbies. Patient will complete all functional transfers with independence using adaptive equipment as needed. Patient will complete UE exercises with independence to increase overall activity tolerance and strength. Timeframe: 7 visits OCCUPATIONAL THERAPY: Initial Assessment and Daily Note 5/20/2019 OUTPATIENT:   
[place] / [place] / Product Type: Medicare /  
  
NAME/AGE/GENDER: Dolores Robertson is a 74 y.o. male PRIMARY DIAGNOSIS:  On total parenteral nutrition (TPN) [Z78.9] Malnutrition ([address] [E46] Dehydration Dehydration ICD-10: Treatment Diagnosis:  
 · Generalized Muscle Weakness (M62.81) · Other lack of cordination (R27.8) · Difficulty in walking, Not elsewhere classified (R26.2) Precautions/Allergies: 
   Patient has no known allergies. ASSESSMENT:  
Mr. Grazyna Sharp presents supine in bed. Here for issues with TPN access, dehydration, and debility. Familiar to therapist from previous admission and seen by therapist on 5/3/19. He is actually doing better than last time and appears to be working well at skilled nursing facility. Able to move around with CGA and no device this session. He still needs SBA to min assist at times for ADL's and transfer but is making good progress and recommend to get therapy here as well as therapy at SNF at d/c. This section established at most recent assessment PROBLEM LIST (Impairments causing functional limitations): 1. Decreased Strength 2. Decreased ADL/Functional Activities 3. Decreased Transfer Abilities 4. Decreased Balance 5. Increased Fatigue INTERVENTIONS PLANNED: (Benefits and precautions of occupational therapy have been discussed with the patient.) 1. Activities of daily living training 2. Adaptive equipment training 3. Balance training 4. Neuromuscular re-eduation 5. Therapeutic activity 6. Therapeutic exercise TREATMENT PLAN: Frequency/Duration: Follow patient 3 x a week to address above goals. Rehabilitation Potential For Stated Goals: Good REHAB RECOMMENDATIONS (at time of discharge pending progress):   
Placement: It is my opinion, based on this patient's performance to date, that Mr. Marroquin may benefit from intensive therapy at a [facility] after discharge due to the functional deficits listed above that are likely to improve with skilled rehabilitation and concerns that he/she may be unsafe to be unsupervised at home due to deficits above . Equipment:  
? None at this time OCCUPATIONAL PROFILE AND HISTORY:  
History of Present Injury/Illness (Reason for Referral): 
See H&P Past Medical History/Comorbidities:  
Mr. Dave Young  has a past medical history of Family history of malignant neoplasm of gastrointestinal tract, Fecal incontinence, Former cigarette smoker, History of rectal cancer, Hypothyroid, Infection and inflammatory reaction due to other internal prosthetic devices, implants and grafts, subsequent encounter (2017), Insomnia, Osteoarthritis, hand, Personal history of colonic polyps (9/2013), Personal history of malignant neoplasm of rectum, rectosigmoid junction, and anus (9/2013), and Psychiatric disorder.  He also has no past medical history of Adverse effect of anesthesia, Aneurysm ([address] Arrhythmia, Asthma, Autoimmune disease ([address] CAD (coronary artery disease), Chronic kidney disease, Chronic obstructive pulmonary disease (HCC), Chronic pain, Coagulation disorder ([address] Diabetes ([address] Difficult intubation, Endocarditis, GERD (gastroesophageal reflux disease), Heart failure ([address] Hypertension, Ill-defined condition, Liver disease, Malignant hyperthermia due to anesthesia, Morbid obesity ([address] Nausea & vomiting, Other ill-defined conditions(799.89), Pseudocholinesterase deficiency, PUD (peptic ulcer disease), Rheumatic fever, Seizures ([address] Stroke ([address] Thromboembolus ([address] Unspecified adverse effect of anesthesia, or Unspecified sleep apnea. Mr. Scott Kilgore  has a past surgical history that includes endoscopy, colon, diagnostic (last 2/3/15); hx polypectomy; hx other surgical (10/7/2013); hx colectomy (11/2013); hx colectomy (Right, 8/2014); hx hernia repair (3/2015, 5/2016); hx gi (4/2016); hx colostomy (5/11/16); hx hernia repair; hx other surgical (Bilateral); colonoscopy thru stoma (2/12/2018); colonoscopy (N/A, 2/12/2018); and hx vascular access (Left, 4/14). Social History/Living Environment:  
Home Environment: Skilled nursing facility Care Facility Name: [facility] One/Two Story Residence: One story Living Alone: Yes Support Systems: Friends \ neighbors Patient Expects to be Discharged to[de-identified] Skilled nursing facility Current DME Used/Available at Home: Other (comment) Prior Level of Function/Work/Activity: 
Here from SNF with plans to return to SNF. Previously was indepdnent. Number of Personal Factors/Comorbidities that affect the Plan of Care: Expanded review of therapy/medical records (1-2):  MODERATE COMPLEXITY ASSESSMENT OF OCCUPATIONAL PERFORMANCE[de-identified]  
Activities of Daily Living:  
Basic ADLs (From Assessment) Complex ADLs (From Assessment) Feeding: Total assistance Oral Facial Hygiene/Grooming: Setup Bathing: Contact guard assistance Upper Body Dressing: Stand-by assistance Lower Body Dressing: Minimum assistance Toileting: Minimum assistance Grooming/Bathing/Dressing Activities of Daily Living Functional Transfers Bathroom Mobility: Stand-by assistance Toilet Transfer : Stand-by assistance Bed/Mat Mobility Supine to Sit: Stand-by assistance Sit to Stand: Stand-by assistance Stand to Sit: Stand-by assistance Bed to Chair: Stand-by assistance Scooting: Stand-by assistance Most Recent Physical Functioning:  
Gross Assessment: 
  
         
  
Posture: 
  
Balance: 
Sitting: Intact Standing: With support Bed Mobility: 
Supine to Sit: Stand-by assistance Scooting: Stand-by assistance Wheelchair Mobility: 
  
Transfers: 
Sit to Stand: Stand-by assistance Stand to Sit: Stand-by assistance Bed to Chair: Stand-by assistance Patient Vitals for the past 6 hrs: 
 BP BP Patient Position SpO2 Pulse 19 0807 127/74 At rest 100 % 74 Mental Status Neurologic State: Alert Orientation Level: Oriented X4 Cognition: Follows commands Physical Skills Involved: 1. Range of Motion 2. Balance 3. Strength 4. Activity Tolerance Cognitive Skills Affected (resulting in the inability to perform in a timely and safe manner): 1. Divided Attention Psychosocial Skills Affected: 1. Environmental Adaptation 2. Self-Awareness Number of elements that affect the Plan of Care: 3-5:  MODERATE COMPLEXITY CLINICAL DECISION MAKIN [facility] [address] [id number] AM-PAC 6 Clicks Daily Activity Inpatient Short Form How much help from another person does the patient currently need. .. Total A Lot A Little None 1. Putting on and taking off regular lower body clothing? ? 1   ? 2   ? 3   ? 4  
2. Bathing (including washing, rinsing, drying)? ? 1   ? 2   ? 3   ? 4  
3.   Toileting, which includes using toilet, bedpan or urinal?   ? 1   ? 2   ? 3   ? 4  
 4.  Putting on and taking off regular upper body clothing? ? 1   ? 2   ? 3   ? 4  
5. Taking care of personal grooming such as brushing teeth? ? 1   ? 2   ? 3   ? 4  
6. Eating meals? ? 1   ? 2   ? 3   ? 4  
© 2007, Trustees of [facility] [employer], under license to [employer]. All rights reserved Score:  Initial: 15 Most Recent: X (Date: -- ) Interpretation of Tool:  Represents activities that are increasingly more difficult (i.e. Bed mobility, Transfers, Gait). Medical Necessity:    
· Skilled intervention continues to be required due to Deficits listed above. Reason for Services/Other Comments: 
· Patient continues to require skilled intervention due to dehydration and debility · . Use of outcome tool(s) and clinical judgement create a POC that gives a: MODERATE COMPLEXITY  
 
 
 
TREATMENT:  
(In addition to Assessment/Re-Assessment sessions the following treatments were rendered) Pre-treatment Symptoms/Complaints:   
Pain: Initial:  
Pain Intensity 1: 0  Post Session:    
 
Therapeutic Activity: (    15): Therapeutic activities including Bed transfers, Chair transfers and Ambulation on level ground to improve mobility, strength and balance. Required minimal   to promote motor control of bilateral, upper extremity(s), lower extremity(s). Also demonstrated red theraband in scapular rows, extension, bicep curls, and horizontal abduction in 1 set of 12 Initial evaluation 10 minutes. Braces/Orthotics/Lines/Etc:  
· IV 
· brito catheter · O2 Device: Room air Treatment/Session Assessment:   
· Response to Treatment:  Good · Interdisciplinary Collaboration:  
o Physical Therapist 
o Occupational Therapist 
o Registered Nurse · After treatment position/precautions:  
o Up in chair 
o Bed in low position 
o Call light within reach · Compliance with Program/Exercises: Compliant all of the time, Will assess as treatment progresses. · Recommendations/Intent for next treatment session: \"Next visit will focus on advancements to more challenging activities\". Total Treatment Duration: OT Patient Time In/Time Out Time In: 0845 Time Out: 0793 Vannesa Breen OT

## 2019-05-20 NOTE — PHYSICIAN ADVISORY
Letter of Determination:  Outpatient Status receiving Observation Services This case was reviewed, and I concur with Outpatient status receiving Observation services. This determination may change depending on further medical information, condition changes of the patient, or treatment requirements. Karen Martinez MD, MEY, Physician Advisor [address] [place] [address].

## 2019-05-20 NOTE — PROGRESS NOTES
TRANSFER - OUT REPORT: 
 
Verbal report given to [address] [place], [place] [address]vd (name) on Pj Rosario  being transferred to [facility]  IR(unit) for routine progression of care Report consisted of patients Situation, Background, Assessment and  
Recommendations(SBAR). Information from the following report(s) SBAR was reviewed with the receiving nurse. Lines:  
Peripheral IV 05/19/19 Left Forearm (Active) Site Assessment Clean, dry, & intact 5/20/2019  5:53 AM  
Phlebitis Assessment 0 5/20/2019  5:53 AM  
Infiltration Assessment 0 5/20/2019  5:53 AM  
Dressing Status Clean, dry, & intact 5/20/2019  5:53 AM  
Dressing Type Transparent;Tape 5/20/2019  5:53 AM  
Hub Color/Line Status Capped 5/20/2019  5:53 AM  
Action Taken Blood drawn 5/19/2019  4:14 PM  
Alcohol Cap Used No 5/19/2019  5:20 PM  
  
 
Opportunity for questions and clarification was provided. Patient transported with: 
[email or web address]. Bishop patent, draining bloody UOP. Emptied before transport.

## 2019-05-20 NOTE — PROGRESS NOTES
Interdisciplinary team rounds were held 5/20/2019 with the following team members:Care Management, Nursing, Nutrition, Pharmacy, Physical Therapy and Physician. Plan of care discussed. See clinical pathway and/or care plan for interventions and desired outcomes.

## 2019-05-21 PROBLEM — D62 ACUTE BLOOD LOSS ANEMIA: Status: ACTIVE | Noted: 2019-05-21

## 2019-05-21 LAB
ALBUMIN SERPL-MCNC: 2.2 G/DL (ref 3.2–4.6)
ALBUMIN/GLOB SERPL: 0.6 {RATIO} (ref 1.2–3.5)
ALP SERPL-CCNC: 298 U/L (ref 50–136)
ALT SERPL-CCNC: 35 U/L (ref 12–65)
ANION GAP SERPL CALC-SCNC: 7 MMOL/L (ref 7–16)
APPEARANCE UR: ABNORMAL
APTT PPP: 38.8 SEC (ref 24.7–39.8)
AST SERPL-CCNC: 22 U/L (ref 15–37)
BACTERIA URNS QL MICRO: 0 /HPF
BASOPHILS # BLD: 0 K/UL (ref 0–0.2)
BASOPHILS NFR BLD: 1 % (ref 0–2)
BILIRUB SERPL-MCNC: 0.5 MG/DL (ref 0.2–1.1)
BILIRUB UR QL: NEGATIVE
BUN SERPL-MCNC: 12 MG/DL (ref 8–23)
CALCIUM SERPL-MCNC: 8.4 MG/DL (ref 8.3–10.4)
CASTS URNS QL MICRO: ABNORMAL /LPF
CHLORIDE SERPL-SCNC: 102 MMOL/L (ref 98–107)
CO2 SERPL-SCNC: 29 MMOL/L (ref 21–32)
COLOR UR: ABNORMAL
CREAT SERPL-MCNC: 0.72 MG/DL (ref 0.8–1.5)
DIFFERENTIAL METHOD BLD: ABNORMAL
EOSINOPHIL # BLD: 0.1 K/UL (ref 0–0.8)
EOSINOPHIL NFR BLD: 2 % (ref 0.5–7.8)
EPI CELLS #/AREA URNS HPF: ABNORMAL /HPF
ERYTHROCYTE [DISTWIDTH] IN BLOOD BY AUTOMATED COUNT: 15 % (ref 11.9–14.6)
GLOBULIN SER CALC-MCNC: 4 G/DL (ref 2.3–3.5)
GLUCOSE BLD STRIP.AUTO-MCNC: 82 MG/DL (ref 65–100)
GLUCOSE BLD STRIP.AUTO-MCNC: 91 MG/DL (ref 65–100)
GLUCOSE SERPL-MCNC: 115 MG/DL (ref 65–100)
GLUCOSE UR STRIP.AUTO-MCNC: NEGATIVE MG/DL
HCT VFR BLD AUTO: 23.1 % (ref 41.1–50.3)
HCT VFR BLD AUTO: 23.8 % (ref 41.1–50.3)
HCT VFR BLD AUTO: 30.3 % (ref 41.1–50.3)
HCT VFR BLD AUTO: 30.5 % (ref 41.1–50.3)
HGB BLD-MCNC: 6.9 G/DL (ref 13.6–17.2)
HGB BLD-MCNC: 7 G/DL (ref 13.6–17.2)
HGB BLD-MCNC: 9.2 G/DL (ref 13.6–17.2)
HGB BLD-MCNC: 9.5 G/DL (ref 13.6–17.2)
HGB UR QL STRIP: ABNORMAL
IMM GRANULOCYTES # BLD AUTO: 0 K/UL (ref 0–0.5)
IMM GRANULOCYTES NFR BLD AUTO: 1 % (ref 0–5)
KETONES UR QL STRIP.AUTO: NEGATIVE MG/DL
LEUKOCYTE ESTERASE UR QL STRIP.AUTO: ABNORMAL
LYMPHOCYTES # BLD: 0.3 K/UL (ref 0.5–4.6)
LYMPHOCYTES NFR BLD: 9 % (ref 13–44)
MAGNESIUM SERPL-MCNC: 1.8 MG/DL (ref 1.8–2.4)
MCH RBC QN AUTO: 25.5 PG (ref 26.1–32.9)
MCHC RBC AUTO-ENTMCNC: 29.4 G/DL (ref 31.4–35)
MCV RBC AUTO: 86.5 FL (ref 79.6–97.8)
MONOCYTES # BLD: 0.4 K/UL (ref 0.1–1.3)
MONOCYTES NFR BLD: 13 % (ref 4–12)
NEUTS SEG # BLD: 2.5 K/UL (ref 1.7–8.2)
NEUTS SEG NFR BLD: 75 % (ref 43–78)
NITRITE UR QL STRIP.AUTO: NEGATIVE
NRBC # BLD: 0 K/UL (ref 0–0.2)
OTHER OBSERVATIONS,UCOM: ABNORMAL
PH UR STRIP: 5 [PH] (ref 5–9)
PHOSPHATE SERPL-MCNC: 4.1 MG/DL (ref 2.3–3.7)
PLATELET # BLD AUTO: 233 K/UL (ref 150–450)
PMV BLD AUTO: 10 FL (ref 9.4–12.3)
POTASSIUM SERPL-SCNC: 3.7 MMOL/L (ref 3.5–5.1)
PROT SERPL-MCNC: 6.2 G/DL (ref 6.3–8.2)
PROT UR STRIP-MCNC: 30 MG/DL
RBC # BLD AUTO: 2.75 M/UL (ref 4.23–5.6)
RBC #/AREA URNS HPF: >100 /HPF
SODIUM SERPL-SCNC: 138 MMOL/L (ref 136–145)
SP GR UR REFRACTOMETRY: 1.01 (ref 1–1.02)
TRIGL SERPL-MCNC: 72 MG/DL (ref 35–150)
UROBILINOGEN UR QL STRIP.AUTO: 1 EU/DL (ref 0.2–1)
WBC # BLD AUTO: 3.4 K/UL (ref 4.3–11.1)
WBC URNS QL MICRO: >100 /HPF

## 2019-05-21 PROCEDURE — 74011000258 HC RX REV CODE- 258: Performed by: INTERNAL MEDICINE

## 2019-05-21 PROCEDURE — 81001 URINALYSIS AUTO W/SCOPE: CPT

## 2019-05-21 PROCEDURE — 85025 COMPLETE CBC W/AUTO DIFF WBC: CPT

## 2019-05-21 PROCEDURE — 74011250636 HC RX REV CODE- 250/636: Performed by: INTERNAL MEDICINE

## 2019-05-21 PROCEDURE — 83735 ASSAY OF MAGNESIUM: CPT

## 2019-05-21 PROCEDURE — 74011250637 HC RX REV CODE- 250/637: Performed by: INTERNAL MEDICINE

## 2019-05-21 PROCEDURE — 65270000029 HC RM PRIVATE

## 2019-05-21 PROCEDURE — 74011000258 HC RX REV CODE- 258: Performed by: HOSPITALIST

## 2019-05-21 PROCEDURE — 77030039270 HC TU BLD FLTR CARD -A

## 2019-05-21 PROCEDURE — 36430 TRANSFUSION BLD/BLD COMPNT: CPT

## 2019-05-21 PROCEDURE — 87186 SC STD MICRODIL/AGAR DIL: CPT

## 2019-05-21 PROCEDURE — P9016 RBC LEUKOCYTES REDUCED: HCPCS

## 2019-05-21 PROCEDURE — 74011250636 HC RX REV CODE- 250/636: Performed by: HOSPITALIST

## 2019-05-21 PROCEDURE — 82962 GLUCOSE BLOOD TEST: CPT

## 2019-05-21 PROCEDURE — 86900 BLOOD TYPING SEROLOGIC ABO: CPT

## 2019-05-21 PROCEDURE — 85018 HEMOGLOBIN: CPT

## 2019-05-21 PROCEDURE — 87088 URINE BACTERIA CULTURE: CPT

## 2019-05-21 PROCEDURE — 77030021907 HC KT URIN FOL O&M -A

## 2019-05-21 PROCEDURE — 30233N1 TRANSFUSION OF NONAUTOLOGOUS RED BLOOD CELLS INTO PERIPHERAL VEIN, PERCUTANEOUS APPROACH: ICD-10-PCS | Performed by: HOSPITALIST

## 2019-05-21 PROCEDURE — 84100 ASSAY OF PHOSPHORUS: CPT

## 2019-05-21 PROCEDURE — 77030010522

## 2019-05-21 PROCEDURE — 85014 HEMATOCRIT: CPT

## 2019-05-21 PROCEDURE — 84478 ASSAY OF TRIGLYCERIDES: CPT

## 2019-05-21 PROCEDURE — 77030010541

## 2019-05-21 PROCEDURE — 85730 THROMBOPLASTIN TIME PARTIAL: CPT

## 2019-05-21 PROCEDURE — 80053 COMPREHEN METABOLIC PANEL: CPT

## 2019-05-21 PROCEDURE — 74011000250 HC RX REV CODE- 250: Performed by: INTERNAL MEDICINE

## 2019-05-21 PROCEDURE — 87086 URINE CULTURE/COLONY COUNT: CPT

## 2019-05-21 PROCEDURE — 36415 COLL VENOUS BLD VENIPUNCTURE: CPT

## 2019-05-21 PROCEDURE — 86923 COMPATIBILITY TEST ELECTRIC: CPT

## 2019-05-21 RX ORDER — CEFPODOXIME PROXETIL 200 MG/1
200 TABLET, FILM COATED ORAL EVERY 12 HOURS
Status: DISCONTINUED | OUTPATIENT
Start: 2019-05-21 | End: 2019-05-22 | Stop reason: HOSPADM

## 2019-05-21 RX ORDER — SODIUM CHLORIDE 9 MG/ML
250 INJECTION, SOLUTION INTRAVENOUS AS NEEDED
Status: DISCONTINUED | OUTPATIENT
Start: 2019-05-21 | End: 2019-05-22 | Stop reason: HOSPADM

## 2019-05-21 RX ADMIN — AMITRIPTYLINE HYDROCHLORIDE 100 MG: 50 TABLET, FILM COATED ORAL at 22:23

## 2019-05-21 RX ADMIN — RIVAROXABAN 15 MG: 15 TABLET, FILM COATED ORAL at 18:24

## 2019-05-21 RX ADMIN — CEFTRIAXONE 1 G: 1 INJECTION, POWDER, FOR SOLUTION INTRAMUSCULAR; INTRAVENOUS at 02:35

## 2019-05-21 RX ADMIN — CEFPODOXIME PROXETIL 200 MG: 200 TABLET, FILM COATED ORAL at 20:01

## 2019-05-21 RX ADMIN — OLANZAPINE 5 MG: 5 TABLET, FILM COATED ORAL at 02:35

## 2019-05-21 RX ADMIN — DEXTROSE MONOHYDRATE, SODIUM CHLORIDE, AND POTASSIUM CHLORIDE 100 ML/HR: 50; 4.5; 1.49 INJECTION, SOLUTION INTRAVENOUS at 19:14

## 2019-05-21 RX ADMIN — SOYBEAN OIL 250 ML: 20 INJECTION, SOLUTION INTRAVENOUS at 17:27

## 2019-05-21 RX ADMIN — Medication 10 ML: at 06:26

## 2019-05-21 RX ADMIN — LEVOTHYROXINE SODIUM 125 MCG: 125 TABLET ORAL at 06:34

## 2019-05-21 RX ADMIN — CALCIUM GLUCONATE: 94 INJECTION, SOLUTION INTRAVENOUS at 17:27

## 2019-05-21 RX ADMIN — CLONAZEPAM 4 MG: 1 TABLET ORAL at 22:22

## 2019-05-21 RX ADMIN — ACETAMINOPHEN 650 MG: 325 TABLET, FILM COATED ORAL at 19:53

## 2019-05-21 RX ADMIN — HEPARIN SODIUM 4350 UNITS: 5000 INJECTION INTRAVENOUS; SUBCUTANEOUS at 00:59

## 2019-05-21 RX ADMIN — HEPARIN SODIUM 18 UNITS/KG/HR: 5000 INJECTION, SOLUTION INTRAVENOUS at 00:38

## 2019-05-21 RX ADMIN — Medication 10 ML: at 22:26

## 2019-05-21 RX ADMIN — DEXTROSE MONOHYDRATE, SODIUM CHLORIDE, AND POTASSIUM CHLORIDE 40 ML/HR: 50; 4.5; 1.49 INJECTION, SOLUTION INTRAVENOUS at 17:28

## 2019-05-21 RX ADMIN — OLANZAPINE 2.5 MG: 2.5 TABLET, FILM COATED ORAL at 22:23

## 2019-05-21 NOTE — PROGRESS NOTES
Patient was started on heparin Gtt for right IJ dvt. Since then his Hgb has dropped to 6.9 and Hct 23.1 (down from 7.6/25.3 at 2207). And now having gross hematuria in brito bag. Will need to stop heparin. Type and cross and transfuse 2 units PRBCs. If still having significant hematuria in am consult Urology. His UA on 5/19 with > 100 WBC and RBC and 1+ bacteria. He is only on PO cipro. Do not see recent urine culture. Will repeat UA, send urine culture and broaden abx coverage with rocephin.

## 2019-05-21 NOTE — PROGRESS NOTES
A.M assessment complete; pt in bed. Alert & oriented. Resp even & unlabored at rest; lungs diminished. HR regular. Abdomen flat & soft with hypoactive bowel sounds. Ileostomy intact; draining brown, foul smelling liquid. Fistula appliance in place draining yellow liquid in small amount. No c/o pain or nausea. Bed low & locked; call light in reach; no needs voiced.

## 2019-05-21 NOTE — PROGRESS NOTES
OT note: attempted to see pt early afternoon. Pt declined secondary to getting blood. Pt was concerned that therapy see him tomorrow as he wanted to \"test myself\"  PT and/or OT will check on pt tomorrow.    Leslie Jacobs, OT

## 2019-05-21 NOTE — PROGRESS NOTES
Admit Date: 2019    POD * No surgery found *    Procedure:  * No surgery found *    Subjective:     Patient denies pain. Concerned about hematuria. Objective:     Visit Vitals  /76 (BP 1 Location: Right arm, BP Patient Position: At rest)   Pulse 65   Temp 97.5 °F (36.4 °C)   Resp 18   Ht 5' 8.5\" (1.74 m)   Wt 120 lb (54.4 kg)   SpO2 93%   BMI 17.98 kg/m²       Temp (24hrs), Av.9 °F (36.6 °C), Min:96.9 °F (36.1 °C), Max:99 °F (37.2 °C)  . I&O reviewed as documented. Physical Exam:    General-in no distress. Abdomen- Wound clean. Midline fistula has matured to essentially appearing as a jejunostomy. Scant succus in pouch. No output from colostomy. Labs:   Recent Results (from the past 24 hour(s))   CBC WITH AUTOMATED DIFF    Collection Time: 19  5:12 PM   Result Value Ref Range    WBC 4.5 4.3 - 11.1 K/uL    RBC 3.09 (L) 4.23 - 5.6 M/uL    HGB 7.9 (L) 13.6 - 17.2 g/dL    HCT 26.8 (L) 41.1 - 50.3 %    MCV 86.7 79.6 - 97.8 FL    MCH 25.6 (L) 26.1 - 32.9 PG    MCHC 29.5 (L) 31.4 - 35.0 g/dL    RDW 15.1 (H) 11.9 - 14.6 %    PLATELET 281 297 - 162 K/uL    MPV 9.7 9.4 - 12.3 FL    ABSOLUTE NRBC 0.00 0.0 - 0.2 K/uL    DF AUTOMATED      NEUTROPHILS 81 (H) 43 - 78 %    LYMPHOCYTES 8 (L) 13 - 44 %    MONOCYTES 10 4.0 - 12.0 %    EOSINOPHILS 1 0.5 - 7.8 %    BASOPHILS 0 0.0 - 2.0 %    IMMATURE GRANULOCYTES 0 0.0 - 5.0 %    ABS. NEUTROPHILS 3.7 1.7 - 8.2 K/UL    ABS. LYMPHOCYTES 0.4 (L) 0.5 - 4.6 K/UL    ABS. MONOCYTES 0.5 0.1 - 1.3 K/UL    ABS. EOSINOPHILS 0.0 0.0 - 0.8 K/UL    ABS. BASOPHILS 0.0 0.0 - 0.2 K/UL    ABS. IMM.  GRANS. 0.0 0.0 - 0.5 K/UL   PTT    Collection Time: 19  5:12 PM   Result Value Ref Range    aPTT 40.9 (H) 24.7 - 39.8 SEC   GLUCOSE, POC    Collection Time: 19  9:33 PM   Result Value Ref Range    Glucose (POC) 119 (H) 65 - 100 mg/dL   CBC WITH AUTOMATED DIFF    Collection Time: 19 10:07 PM   Result Value Ref Range    WBC 3.6 (L) 4.3 - 11.1 K/uL RBC 2.92 (L) 4.23 - 5.6 M/uL    HGB 7.6 (L) 13.6 - 17.2 g/dL    HCT 25.3 (L) 41.1 - 50.3 %    MCV 86.6 79.6 - 97.8 FL    MCH 26.0 (L) 26.1 - 32.9 PG    MCHC 30.0 (L) 31.4 - 35.0 g/dL    RDW 15.1 (H) 11.9 - 14.6 %    PLATELET 916 272 - 076 K/uL    MPV 9.8 9.4 - 12.3 FL    ABSOLUTE NRBC 0.00 0.0 - 0.2 K/uL    DF AUTOMATED      NEUTROPHILS 77 43 - 78 %    LYMPHOCYTES 7 (L) 13 - 44 %    MONOCYTES 14 (H) 4.0 - 12.0 %    EOSINOPHILS 1 0.5 - 7.8 %    BASOPHILS 1 0.0 - 2.0 %    IMMATURE GRANULOCYTES 1 0.0 - 5.0 %    ABS. NEUTROPHILS 2.8 1.7 - 8.2 K/UL    ABS. LYMPHOCYTES 0.3 (L) 0.5 - 4.6 K/UL    ABS. MONOCYTES 0.5 0.1 - 1.3 K/UL    ABS. EOSINOPHILS 0.0 0.0 - 0.8 K/UL    ABS. BASOPHILS 0.0 0.0 - 0.2 K/UL    ABS. IMM.  GRANS. 0.0 0.0 - 0.5 K/UL   PTT    Collection Time: 05/20/19 10:07 PM   Result Value Ref Range    aPTT 42.4 (H) 24.7 - 39.8 SEC   HEMOGLOBIN    Collection Time: 05/21/19  1:16 AM   Result Value Ref Range    HGB 6.9 (LL) 13.6 - 17.2 g/dL   HEMATOCRIT    Collection Time: 05/21/19  1:16 AM   Result Value Ref Range    HCT 23.1 (L) 41.1 - 50.3 %   TYPE + CROSSMATCH    Collection Time: 05/21/19  3:26 AM   Result Value Ref Range    Crossmatch Expiration 05/24/2019     ABO/Rh(D) A POSITIVE     Antibody screen NEG     Unit number [id number]     Blood component type [facility]     Unit division 00     Status of unit ISSUED     Crossmatch result Compatible     Unit number [id number]     Blood component type [facility]     Unit division 00     Status of unit ISSUED     Crossmatch result Compatible    CULTURE, URINE    Collection Time: 05/21/19  4:13 AM   Result Value Ref Range    Special Requests: COLLECTED IN GREY TUBE     Culture result: PENDING    URINALYSIS W/ RFLX MICROSCOPIC    Collection Time: 05/21/19  4:13 AM   Result Value Ref Range    Color JASMINE      Appearance TURBID      Specific gravity 1.012 1.001 - 1.023      pH (UA) 5.0 5.0 - 9.0      Protein 30 (A) NEG mg/dL    Glucose NEGATIVE  mg/dL    Ketone NEGATIVE NEG mg/dL    Bilirubin NEGATIVE  NEG      Blood LARGE (A) NEG      Urobilinogen 1.0 0.2 - 1.0 EU/dL    Nitrites NEGATIVE  NEG      Leukocyte Esterase MODERATE (A) NEG      WBC >100 0 /hpf    RBC >100 0 /hpf    Epithelial cells 0-3 0 /hpf    Bacteria 0 0 /hpf    Casts 10-20 0 /lpf    Other observations RESULTS VERIFIED MANUALLY     CBC WITH AUTOMATED DIFF    Collection Time: 05/21/19  5:47 AM   Result Value Ref Range    WBC 3.4 (L) 4.3 - 11.1 K/uL    RBC 2.75 (L) 4.23 - 5.6 M/uL    HGB 7.0 (L) 13.6 - 17.2 g/dL    HCT 23.8 (L) 41.1 - 50.3 %    MCV 86.5 79.6 - 97.8 FL    MCH 25.5 (L) 26.1 - 32.9 PG    MCHC 29.4 (L) 31.4 - 35.0 g/dL    RDW 15.0 (H) 11.9 - 14.6 %    PLATELET 172 375 - 975 K/uL    MPV 10.0 9.4 - 12.3 FL    ABSOLUTE NRBC 0.00 0.0 - 0.2 K/uL    DF AUTOMATED      NEUTROPHILS 75 43 - 78 %    LYMPHOCYTES 9 (L) 13 - 44 %    MONOCYTES 13 (H) 4.0 - 12.0 %    EOSINOPHILS 2 0.5 - 7.8 %    BASOPHILS 1 0.0 - 2.0 %    IMMATURE GRANULOCYTES 1 0.0 - 5.0 %    ABS. NEUTROPHILS 2.5 1.7 - 8.2 K/UL    ABS. LYMPHOCYTES 0.3 (L) 0.5 - 4.6 K/UL    ABS. MONOCYTES 0.4 0.1 - 1.3 K/UL    ABS. EOSINOPHILS 0.1 0.0 - 0.8 K/UL    ABS. BASOPHILS 0.0 0.0 - 0.2 K/UL    ABS. IMM. GRANS. 0.0 0.0 - 0.5 K/UL   METABOLIC PANEL, COMPREHENSIVE    Collection Time: 05/21/19  5:47 AM   Result Value Ref Range    Sodium 138 136 - 145 mmol/L    Potassium 3.7 3.5 - 5.1 mmol/L    Chloride 102 98 - 107 mmol/L    CO2 29 21 - 32 mmol/L    Anion gap 7 7 - 16 mmol/L    Glucose 115 (H) 65 - 100 mg/dL    BUN 12 8 - 23 MG/DL    Creatinine 0.72 (L) 0.8 - 1.5 MG/DL    GFR est AA >60 >60 ml/min/1.73m2    GFR est non-AA >60 >60 ml/min/1.73m2    Calcium 8.4 8.3 - 10.4 MG/DL    Bilirubin, total 0.5 0.2 - 1.1 MG/DL    ALT (SGPT) 35 12 - 65 U/L    AST (SGOT) 22 15 - 37 U/L    Alk.  phosphatase 298 (H) 50 - 136 U/L    Protein, total 6.2 (L) 6.3 - 8.2 g/dL    Albumin 2.2 (L) 3.2 - 4.6 g/dL    Globulin 4.0 (H) 2.3 - 3.5 g/dL    A-G Ratio 0.6 (L) 1.2 - 3.5     PTT Collection Time: 05/21/19  5:47 AM   Result Value Ref Range    aPTT 38.8 24.7 - 39.8 SEC   MAGNESIUM    Collection Time: 05/21/19  5:47 AM   Result Value Ref Range    Magnesium 1.8 1.8 - 2.4 mg/dL   PHOSPHORUS    Collection Time: 05/21/19  5:47 AM   Result Value Ref Range    Phosphorus 4.1 (H) 2.3 - 3.7 MG/DL   TRIGLYCERIDE    Collection Time: 05/21/19  5:47 AM   Result Value Ref Range    Triglyceride 72 35 - 150 MG/DL   GLUCOSE, POC    Collection Time: 05/21/19  7:34 AM   Result Value Ref Range    Glucose (POC) 91 65 - 100 mg/dL   HGB & HCT    Collection Time: 05/21/19  2:14 PM   Result Value Ref Range    HGB 9.2 (L) 13.6 - 17.2 g/dL    HCT 30.5 (L) 41.1 - 50.3 %            Assessment:     Principal Problem:    Acute deep vein thrombosis (DVT) of non-extremity vein (5/20/2019)    Active Problems:    Rectal cancer ([address] (10/22/2013)      Dehydration (4/17/2014)      Attention to ileostomy ([address] (8/20/2014)      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 This is discussed in detail, and Mr Kemal Grider would like to contine current management for a few more weeks, to see if there is any progress. Should we declare conservative management failed, 2 remaining options would be surgical intervention or aborting all attempts at fistula treatment. In the second option, we would have to see how his nutritional status progresses with what would likely show itself to be a large volume (if not 100%) high enterocutaneous fistula (essentially a jejunostomy).   If he opted for this and could not maintain nutrition, then surgical intervention would still be an option. Discussed inpatient issues with Dr Omari Pace- he has no more hematuria, which was likely bladder irritation from indwelling brito with hemorrhage from heparin bolus. Recommend treating RIJ DVT with xarelto and discharge back to SNF, now that he is s/p 2u PRBC. I will see him in office in 2 weeks.

## 2019-05-21 NOTE — PROGRESS NOTES
Interdisciplinary team rounds were held 5/21/2019 with the following team members:Care Management, Nursing, Nutrition, Pharmacy, Physical Therapy and Physician. Plan of care discussed. See clinical pathway and/or care plan for interventions and desired outcomes.

## 2019-05-21 NOTE — PROGRESS NOTES
Shift assessment complete. A&OX4. Respirations present. Even and unlabored. Apical HR is regular. No s/s of distress. No c/o pain at this time. Call light within reach. Encouraged patient to call for assistance. Patient verbalizes understanding. See Doc Flowsheet for assessment details. Patient resting in bed. Will continue to monitor.

## 2019-05-21 NOTE — PROGRESS NOTES
Visited with pt regarding plans for discharge, pt was started on Heparin gtt, then d/c due to Hgb & Hct levels, to have 2u PRBC's and Urology to be consulted due to hematuria. Will continue to follow and up date [facility].

## 2019-05-21 NOTE — PROGRESS NOTES
Care Management Interventions  PCP Verified by CM: Yes  Mode of Transport at Discharge: Other (see comment)  Transition of Care Consult (CM Consult): SNF  Partner SNF: Yes  Current Support Network: Own Home  Confirm Follow Up Transport: Family  Plan discussed with Pt/Family/Caregiver: Yes  Freedom of Choice Offered: Yes  Discharge Location  Discharge Placement: Skilled nursing facility   Visited with pt regarding plans for discharge, pt came from [facility] STR for a new central line placement. Pt to transfer DT for a tunneled catheter, return and then d/c back to [facility] this afternoon. 0 Pt has a DVT in Rt IJ and will not be able to transfer this afternoon. BC aware.

## 2019-05-21 NOTE — PROGRESS NOTES
Hospitalist Progress Note    Patient: Dolores Robertson MRN: [id number]  SSN: [id number]    YOB: 1945  Age: 74 y.o. Sex: male      Admit Date: 5/19/2019    LOS: 1 day     Subjective:     From previous notes: \"74year-old male [facility] with complaints of this dislodged intravenous catheter. Patient states that he woke up this morning with a trickle blood on his chest. He is unsure how the central line became dislodged. Patient reports that he still getting TPN through the catheter.  While his fistula is healing. IJ line was placed, 4/29/2019 by Dr. Rigoberto Brody. Patient denies any chest pain or shortness of breath.  No recent fever, vomiting or diarrhea  Patient states he is currently taking sips of liquids only. \"    5/21 - He feels good today. Asking why he's getting hematuria. Denies abdominal pain. Denies dizziness. Review of systems negative except stated above. Objective:     Visit Vitals  /63 (BP 1 Location: Right arm, BP Patient Position: At rest)   Pulse 70   Temp 97.5 °F (36.4 °C)   Resp 18   Ht 5' 8.5\" (1.74 m)   Wt 54.4 kg (120 lb)   SpO2 97%   BMI 17.98 kg/m²      Oxygen Therapy  O2 Sat (%): 97 % (05/21/19 1628)  Pulse via Oximetry: 93 beats per minute (05/19/19 1516)  O2 Device: Room air (05/20/19 1950)      Intake and Output:     Intake/Output Summary (Last 24 hours) at 5/21/2019 1722  Last data filed at 5/21/2019 1226  Gross per 24 hour   Intake 1044 ml   Output 1100 ml   Net -56 ml         Physical Exam:   GENERAL: alert, cooperative, no distress, appears stated age  EYE: conjunctivae/corneas clear. PERRL. THROAT & NECK: normal and no erythema or exudates noted. LUNG: clear to auscultation bilaterally  HEART: regular rate and rhythm, S1S2, no murmur, no JVD  ABDOMEN: soft, non-tender, non-distended. Ostomy, wound clean  EXTREMITIES:  No edema, 2+ pedal/radial pulses bilaterally  SKIN: no rash or abnormalities  NEUROLOGIC: A&Ox3. Cranial nerves 2-12 grossly intact. Lab/Data Review:  Recent Results (from the past 24 hour(s))   GLUCOSE, POC    Collection Time: 05/20/19  9:33 PM   Result Value Ref Range    Glucose (POC) 119 (H) 65 - 100 mg/dL   CBC WITH AUTOMATED DIFF    Collection Time: 05/20/19 10:07 PM   Result Value Ref Range    WBC 3.6 (L) 4.3 - 11.1 K/uL    RBC 2.92 (L) 4.23 - 5.6 M/uL    HGB 7.6 (L) 13.6 - 17.2 g/dL    HCT 25.3 (L) 41.1 - 50.3 %    MCV 86.6 79.6 - 97.8 FL    MCH 26.0 (L) 26.1 - 32.9 PG    MCHC 30.0 (L) 31.4 - 35.0 g/dL    RDW 15.1 (H) 11.9 - 14.6 %    PLATELET 073 600 - 582 K/uL    MPV 9.8 9.4 - 12.3 FL    ABSOLUTE NRBC 0.00 0.0 - 0.2 K/uL    DF AUTOMATED      NEUTROPHILS 77 43 - 78 %    LYMPHOCYTES 7 (L) 13 - 44 %    MONOCYTES 14 (H) 4.0 - 12.0 %    EOSINOPHILS 1 0.5 - 7.8 %    BASOPHILS 1 0.0 - 2.0 %    IMMATURE GRANULOCYTES 1 0.0 - 5.0 %    ABS. NEUTROPHILS 2.8 1.7 - 8.2 K/UL    ABS. LYMPHOCYTES 0.3 (L) 0.5 - 4.6 K/UL    ABS. MONOCYTES 0.5 0.1 - 1.3 K/UL    ABS. EOSINOPHILS 0.0 0.0 - 0.8 K/UL    ABS. BASOPHILS 0.0 0.0 - 0.2 K/UL    ABS. IMM.  GRANS. 0.0 0.0 - 0.5 K/UL   PTT    Collection Time: 05/20/19 10:07 PM   Result Value Ref Range    aPTT 42.4 (H) 24.7 - 39.8 SEC   HEMOGLOBIN    Collection Time: 05/21/19  1:16 AM   Result Value Ref Range    HGB 6.9 (LL) 13.6 - 17.2 g/dL   HEMATOCRIT    Collection Time: 05/21/19  1:16 AM   Result Value Ref Range    HCT 23.1 (L) 41.1 - 50.3 %   TYPE + CROSSMATCH    Collection Time: 05/21/19  3:26 AM   Result Value Ref Range    Crossmatch Expiration 05/24/2019     ABO/Rh(D) A POSITIVE     Antibody screen NEG     Unit number [id number]     Blood component type [facility]     Unit division 00     Status of unit ISSUED     Crossmatch result Compatible     Unit number [id number]     Blood component type [facility]     Unit division 00     Status of unit ISSUED     Crossmatch result Compatible    CULTURE, URINE    Collection Time: 05/21/19  4:13 AM   Result Value Ref Range    Special Requests: COLLECTED IN GREY TUBE     Culture result: PENDING    URINALYSIS W/ RFLX MICROSCOPIC    Collection Time: 05/21/19  4:13 AM   Result Value Ref Range    Color JASMINE      Appearance TURBID      Specific gravity 1.012 1.001 - 1.023      pH (UA) 5.0 5.0 - 9.0      Protein 30 (A) NEG mg/dL    Glucose NEGATIVE  mg/dL    Ketone NEGATIVE  NEG mg/dL    Bilirubin NEGATIVE  NEG      Blood LARGE (A) NEG      Urobilinogen 1.0 0.2 - 1.0 EU/dL    Nitrites NEGATIVE  NEG      Leukocyte Esterase MODERATE (A) NEG      WBC >100 0 /hpf    RBC >100 0 /hpf    Epithelial cells 0-3 0 /hpf    Bacteria 0 0 /hpf    Casts 10-20 0 /lpf    Other observations RESULTS VERIFIED MANUALLY     CBC WITH AUTOMATED DIFF    Collection Time: 05/21/19  5:47 AM   Result Value Ref Range    WBC 3.4 (L) 4.3 - 11.1 K/uL    RBC 2.75 (L) 4.23 - 5.6 M/uL    HGB 7.0 (L) 13.6 - 17.2 g/dL    HCT 23.8 (L) 41.1 - 50.3 %    MCV 86.5 79.6 - 97.8 FL    MCH 25.5 (L) 26.1 - 32.9 PG    MCHC 29.4 (L) 31.4 - 35.0 g/dL    RDW 15.0 (H) 11.9 - 14.6 %    PLATELET 730 274 - 381 K/uL    MPV 10.0 9.4 - 12.3 FL    ABSOLUTE NRBC 0.00 0.0 - 0.2 K/uL    DF AUTOMATED      NEUTROPHILS 75 43 - 78 %    LYMPHOCYTES 9 (L) 13 - 44 %    MONOCYTES 13 (H) 4.0 - 12.0 %    EOSINOPHILS 2 0.5 - 7.8 %    BASOPHILS 1 0.0 - 2.0 %    IMMATURE GRANULOCYTES 1 0.0 - 5.0 %    ABS. NEUTROPHILS 2.5 1.7 - 8.2 K/UL    ABS. LYMPHOCYTES 0.3 (L) 0.5 - 4.6 K/UL    ABS. MONOCYTES 0.4 0.1 - 1.3 K/UL    ABS. EOSINOPHILS 0.1 0.0 - 0.8 K/UL    ABS. BASOPHILS 0.0 0.0 - 0.2 K/UL    ABS. IMM.  GRANS. 0.0 0.0 - 0.5 K/UL   METABOLIC PANEL, COMPREHENSIVE    Collection Time: 05/21/19  5:47 AM   Result Value Ref Range    Sodium 138 136 - 145 mmol/L    Potassium 3.7 3.5 - 5.1 mmol/L    Chloride 102 98 - 107 mmol/L    CO2 29 21 - 32 mmol/L    Anion gap 7 7 - 16 mmol/L    Glucose 115 (H) 65 - 100 mg/dL    BUN 12 8 - 23 MG/DL    Creatinine 0.72 (L) 0.8 - 1.5 MG/DL    GFR est AA >60 >60 ml/min/1.73m2    GFR est non-AA >60 >60 ml/min/1.73m2    Calcium 8.4 8.3 - 10.4 MG/DL    Bilirubin, total 0.5 0.2 - 1.1 MG/DL    ALT (SGPT) 35 12 - 65 U/L    AST (SGOT) 22 15 - 37 U/L    Alk. phosphatase 298 (H) 50 - 136 U/L    Protein, total 6.2 (L) 6.3 - 8.2 g/dL    Albumin 2.2 (L) 3.2 - 4.6 g/dL    Globulin 4.0 (H) 2.3 - 3.5 g/dL    A-G Ratio 0.6 (L) 1.2 - 3.5     PTT    Collection Time: 05/21/19  5:47 AM   Result Value Ref Range    aPTT 38.8 24.7 - 39.8 SEC   MAGNESIUM    Collection Time: 05/21/19  5:47 AM   Result Value Ref Range    Magnesium 1.8 1.8 - 2.4 mg/dL   PHOSPHORUS    Collection Time: 05/21/19  5:47 AM   Result Value Ref Range    Phosphorus 4.1 (H) 2.3 - 3.7 MG/DL   TRIGLYCERIDE    Collection Time: 05/21/19  5:47 AM   Result Value Ref Range    Triglyceride 72 35 - 150 MG/DL   GLUCOSE, POC    Collection Time: 05/21/19  7:34 AM   Result Value Ref Range    Glucose (POC) 91 65 - 100 mg/dL   HGB & HCT    Collection Time: 05/21/19  2:14 PM   Result Value Ref Range    HGB 9.2 (L) 13.6 - 17.2 g/dL    HCT 30.5 (L) 41.1 - 50.3 %   GLUCOSE, POC    Collection Time: 05/21/19  4:29 PM   Result Value Ref Range    Glucose (POC) 82 65 - 100 mg/dL       Imaging:  Xr Gastrograffin Small Bowel    Result Date: 4/24/2019  Gastrografin small bowel exam HISTORY: Partial small bowel obstruction. Recent small bowel resection. FINDINGS:  image demonstrates diffuse gaseous dilatation of multiple bowel loops as well as the stomach. There are midline skin staples in left lower quadrant ostomy site. The patient was given water-soluble contrast by mouth and serial overhead films were performed. Transit time to the ostomy site left lower quadrant is between 2 and 3 hours. There is diffuse dilatation of bowel loops in the central right abdomen. Bowel loops on the left side of the abdomen are more normal caliber in these appear to be more distal bowel loops. IMPRESSION: There is no evidence of significant mechanical bowel obstruction.  Enterostomy site located in the left lower quadrant. Xr Inj Sinus Tract S I    Result Date: 5/1/2019  FISTULOGRAM. HISTORY: Enterocutaneous fistula, status post colectomy. FINDINGS:  film demonstrates an ostomy device for an ileostomy in the left lower quadrant. There is also an ostomy device in the midline which uncovers the cutaneous fistula. A 12 Arabic catheter was inserted into the fistulous track a few centimeters. It immediately began to opacify a loop of small bowel which tracks superiorly and to the right. There is an oval lenticular contrast collection which persists in the right upper quadrant at the level of the right L3 pedicle which appears extraluminal. The patient was rolled right posterior oblique to better demonstrate the anatomy not overlying the spine. The majority of contrast was rolling back along the catheter and pooling in the ostomy device which was repeatedly cleared. Results discussed with Dr. Ashwini Freitas. IMPRESSION: Fluoroscopic demonstration of an enterocutaneous fistula and a small contained extra luminal contrast collection in the right upper quadrant. Ir Insert Marcela Goes Cvc W/o Port Over 5 Yr    Result Date: 5/20/2019  Title: Tunneled central venous catheter placement. Ultrasound guidance for vascular access. Indication:  Malnutrition, needs parenteral nutrition Consent: Informed written and oral consent was obtained from the patient after explanation of benefits and risks (including, but not limited to: Infection, hemorrhage, pneumothorax). The patient's questions were answered to their satisfaction. The patient stated understanding and requested that we proceed. Procedure:  Maximal sterile barrier technique (including:  cap, mask, sterile gown, sterile gloves, sterile sheet, hand hygiene, chlorhexidene for antiseptic skin preparation, sterile ultrasound techniques including sterile gel and sterile ultrasound probe cover) was used. A local field block with lidocaine was achieved.   Ultrasound evaluation of all potential access sites was performed due to lack of a palpable vein. No veins were palpable due to overlying adipose tissue. Using real-time ultrasound guidance, with appropriate image recording and visualization of vascular needle entry, the patent left internal jugular vein was accessed using micropuncture technique. Using fluoroscopy, a peel-away sheath was placed over a wire. A subcutaneous tunnel was anesthetized on the left chest wall. A 5 Urdu single lumen tunneled central venous catheter was then brought through the subcutaneous tunnel and passed down the peel-away sheath positioning the tip in the Right Atrium. A permanent radiograph image was recorded. All lumens aspirated easily and were filled with heparinized saline. The venotomy incision was closed with absorbable suture. The catheter was secured with nonabsorbable suture. . Complications: None. Radiation Exposure Indices: Reference Air Kerma (Ka,r) = 4 mGy Dose Area Product/Kerma Area Product (DAP/JEAN PIERRE/PKA) = 100  cGy-cm2 Fluoroscopy Exposure Time = 36 seconds Fluorographic Images = Number Contrast: 0. Medications: None Findings:  Patent left internal jugular vein. Catheter tip in the mid right atrium. Impression: Technically successful tunneled central venous catheter placement. Note is made of some acute appearing thrombus in the right internal jugular vein. Clinical significance is uncertain. Plan: Recover for 1 hour. Catheter is ready for use. Suture should be removed in 2 weeks. Xr Chest Port    Result Date: 5/19/2019  EXAM: Single view chest radiograph. INDICATION: 73 years dislodged IJ catheter COMPARISON: Chest radiograph dated April 29, 2019. FINDINGS: Interval removal of a right IJ central venous catheter. No pneumothorax. Unchanged elevation of the left hemidiaphragm with atelectatic change of the left lung base. IMPRESSION: 1. Removal of a right IJ central venous catheter.  2. Unchanged elevation of the left hemidiaphragm with left basilar atelectasis. Xr Chest Port    Result Date: 4/29/2019  Portable Chest  X-ray  Indication: Central line placement Comparison:  March 30, 2019 Findings:  Portable AP view demonstrates right IJ catheter tip at the brachiocephalic vein junction. Negative pneumothorax. Left hemidiaphragm elevation again noted. No new infiltrates in the lungs. Impression:  Right IJ catheter tip at the brachiocephalic vein junction. Negative for pneumothorax. No results found for this visit on 05/19/19. Cultures: All Micro Results     Procedure Component Value Units Date/Time    CULTURE, URINE [[id number]] Collected:  05/21/19 0413    Order Status:  Completed Specimen:  Urine from Bishop Specimen Updated:  05/21/19 0657     Special Requests: COLLECTED IN GREY TUBE     Culture result: PENDING    CULTURE, URINE [[id number]]     Order Status:  Canceled Specimen:  Urine from Bishop Specimen     MRSA SCREEN - PCR (NASAL) [[id number]] Collected:  05/19/19 2022    Order Status:  Completed Specimen:  Nasal Updated:  05/19/19 2203     Special Requests: NO SPECIAL REQUESTS        Culture result:       SA target not detected. A MRSA NEGATIVE, SA NEGATIVE test result does not preclude MRSA or SA nasal colonization.                 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 Plan discharge in AM.    DIET NPO With Sips of Clear Fluids  TPN ADULT-CENTRAL - dextrose 15% amino acid 5%    DVT Prophylaxis: Xarelto      Signed By: Sadia Lyn, DO     May 21, 2019

## 2019-05-21 NOTE — PROGRESS NOTES
Bedside and Verbal shift change report given to Henna Díaz RN  (oncoming nurse) by Emilia Garner RN (offgoing nurse). Report included the following information SBAR, Kardex, Intake/Output, MAR and Recent Results.

## 2019-05-21 NOTE — PROGRESS NOTES
Critical lab value called regarding Hgb of 6.9 to Dr. Evaristo Rust. New order received- Stop Heparin, get type and cross and transfuse 2 units of PRBC's. Also, get a U/A & C&S.

## 2019-05-21 NOTE — PROGRESS NOTES
Saw pt in interdisciplinarily rounds with the following disciplines: Physician, Case Management, Nursing, Dietary,Therapy and Pharmacy. The plan of care was discussed along with discharge date/ location. Per the hospitalitis plans to to d/c to [facility] tomorrow.

## 2019-05-21 NOTE — PROGRESS NOTES
Pt's BS is 73. Discussed with dr. Grayson Hammond. No orders at this time for low BS. No s/s of hypoglycemia noted in pt. Will restart D5 1/2 +20K once blood is done infusing. Pt in stable condition in bed. No needs voiced.

## 2019-05-21 NOTE — PROGRESS NOTES
Nutrition  Reason for assessment: Consult for TPN management per nutritional support protocols-Dr. True Lockwood. Assessment:   Diet order(s): NPO-sips of clear liquids with medication  Food/Nutrition History:  Pt presented from [facility] with c/o dislodged right IJ CVC. Past medical history notable for rectal cancer > 5 years, chronic brito, recurrent SBO, abscesses, ileostomy, colostomy in 2014. Central line access: Tunneled CVC placed 5/20/19  Pertinent Medications: IVF:  5% dex 0.45% sodium chloride with 20 KCl at 100 ml/hr, Humalog  Pertinent Labs: Hyperphosphatemia 4.1 POC Glucoses:  91 mg/dl  Anthropometrics:Height: 5' 8.5\" (174 cm), Weight Source: Patient stated, Weight: 54.4 kg (120 lb), Body mass index is 17.98 kg/m². [facility] BMI class ofunderweight for Older American Male. Weight history per EMR:  Unable to determine if weights are actual vs stated due to functionality of [facility]. WT / BMI WEIGHT BODY MASS INDEX   5/19/2019 120 lb 17.98 kg/m2   5/3/2019 130 lb 8.2 oz 19.84 kg/m2   3/24/2019 137 lb 20.83 kg/m2   3/20/2019 140 lb 21.29 kg/m2   12/14/2018 142 lb 20.37 kg/m2   Pt meets [address] [place] [address] for Chronic Illness-moderate malnutrition r/t weight loss of >10% in 6 months, loss of body fat and muscle mass. Macronutrient needs:  Based on 55 kg  EER:  5097-6884 kcal /day (30-35 kcal/kg BW)  EPR:  66-83 grams protein/day (1.2-1.5 grams/kg BW) GFR >60ml/min  Max CHO:  317 grams/day (4mg/kg BW/min)  Fluid:  1ml/kcal  Intake/Comparative Standards: Current NPO status does not meet kcal or protein needs. Current IVF provides 408 non protein kcal/day. Nutrition Diagnosis:  Altered GI function r/t EC fistula as evidenced by NPO status and the use of long term TPN. .  Intervention:   Meals and snacks: NPO  Nutritional Supplement Therapy: Electrolyte replacement per nutritional support protocols are active on MAR.   PN/IVF:   1. Start TPN: 15%DEX/ 5%AA at 50 ml/hr with 250 ml 20% Lipids daily provides 1352 kcal/d (69% of needs), 60 grams of protein/d (72% of needs), 180 grams of CHO/d (does not exceed maximum CHO load) and 1450 ml of total volume/d + additional 960 ml/day from IVF at 40 ml/hr(100% of needs). 2. Adjust IVF once TPN starts to 40 ml/hr. .  3. Potential goal TPN: 15%DEX/ 5%AA at 75 ml/hr with 250 ml 20% Lipids daily provides 1878  kcal/d (100% of needs), 90 grams of protein/d (108% of needs), 270 grams of CHO/d (does not exceed maximum CHO load) and 2050 ml of total volume/d ( 100% of needs). 4. Add TPN labs, POC Glucoses/SSI if Glucose > 180 mg/dl on AM BMP. Coordination of Nutrition Care: Ashvin Trujillo RN  Discharge nutrition plan:  Too soon to determine    Kinjal Kuhn, [address] [place] [address], [address] [place] [address], [address] [place] [address]  [phone]

## 2019-05-22 ENCOUNTER — PATIENT OUTREACH (OUTPATIENT)
Dept: CASE MANAGEMENT | Age: 74
End: 2019-05-22

## 2019-05-22 VITALS
OXYGEN SATURATION: 98 % | HEART RATE: 84 BPM | HEIGHT: 69 IN | TEMPERATURE: 98.4 F | RESPIRATION RATE: 18 BRPM | BODY MASS INDEX: 18.97 KG/M2 | DIASTOLIC BLOOD PRESSURE: 73 MMHG | SYSTOLIC BLOOD PRESSURE: 122 MMHG | WEIGHT: 128.1 LBS

## 2019-05-22 LAB
ABO + RH BLD: NORMAL
ANION GAP SERPL CALC-SCNC: 5 MMOL/L (ref 7–16)
BASOPHILS # BLD: 0 K/UL (ref 0–0.2)
BASOPHILS NFR BLD: 1 % (ref 0–2)
BLD PROD TYP BPU: NORMAL
BLD PROD TYP BPU: NORMAL
BLOOD GROUP ANTIBODIES SERPL: NORMAL
BPU ID: NORMAL
BPU ID: NORMAL
BUN SERPL-MCNC: 11 MG/DL (ref 8–23)
CALCIUM SERPL-MCNC: 8.8 MG/DL (ref 8.3–10.4)
CHLORIDE SERPL-SCNC: 102 MMOL/L (ref 98–107)
CO2 SERPL-SCNC: 30 MMOL/L (ref 21–32)
CREAT SERPL-MCNC: 0.76 MG/DL (ref 0.8–1.5)
CROSSMATCH RESULT,%XM: NORMAL
CROSSMATCH RESULT,%XM: NORMAL
DIFFERENTIAL METHOD BLD: ABNORMAL
EOSINOPHIL # BLD: 0.1 K/UL (ref 0–0.8)
EOSINOPHIL NFR BLD: 2 % (ref 0.5–7.8)
ERYTHROCYTE [DISTWIDTH] IN BLOOD BY AUTOMATED COUNT: 15.2 % (ref 11.9–14.6)
GLUCOSE BLD STRIP.AUTO-MCNC: 126 MG/DL (ref 65–100)
GLUCOSE BLD STRIP.AUTO-MCNC: 138 MG/DL (ref 65–100)
GLUCOSE BLD STRIP.AUTO-MCNC: 73 MG/DL (ref 65–100)
GLUCOSE SERPL-MCNC: 138 MG/DL (ref 65–100)
HCT VFR BLD AUTO: 31.3 % (ref 41.1–50.3)
HGB BLD-MCNC: 9.7 G/DL (ref 13.6–17.2)
IMM GRANULOCYTES # BLD AUTO: 0 K/UL (ref 0–0.5)
IMM GRANULOCYTES NFR BLD AUTO: 1 % (ref 0–5)
LYMPHOCYTES # BLD: 0.4 K/UL (ref 0.5–4.6)
LYMPHOCYTES NFR BLD: 8 % (ref 13–44)
MAGNESIUM SERPL-MCNC: 1.8 MG/DL (ref 1.8–2.4)
MCH RBC QN AUTO: 27 PG (ref 26.1–32.9)
MCHC RBC AUTO-ENTMCNC: 31 G/DL (ref 31.4–35)
MCV RBC AUTO: 87.2 FL (ref 79.6–97.8)
MONOCYTES # BLD: 0.5 K/UL (ref 0.1–1.3)
MONOCYTES NFR BLD: 10 % (ref 4–12)
NEUTS SEG # BLD: 3.5 K/UL (ref 1.7–8.2)
NEUTS SEG NFR BLD: 79 % (ref 43–78)
NRBC # BLD: 0 K/UL (ref 0–0.2)
PHOSPHATE SERPL-MCNC: 3.7 MG/DL (ref 2.3–3.7)
PLATELET # BLD AUTO: 242 K/UL (ref 150–450)
PMV BLD AUTO: 10 FL (ref 9.4–12.3)
POTASSIUM SERPL-SCNC: 4.1 MMOL/L (ref 3.5–5.1)
RBC # BLD AUTO: 3.59 M/UL (ref 4.23–5.6)
SODIUM SERPL-SCNC: 137 MMOL/L (ref 136–145)
SPECIMEN EXP DATE BLD: NORMAL
STATUS OF UNIT,%ST: NORMAL
STATUS OF UNIT,%ST: NORMAL
TRIGL SERPL-MCNC: 64 MG/DL (ref 35–150)
UNIT DIVISION, %UDIV: 0
UNIT DIVISION, %UDIV: 0
WBC # BLD AUTO: 4.4 K/UL (ref 4.3–11.1)

## 2019-05-22 PROCEDURE — 84478 ASSAY OF TRIGLYCERIDES: CPT

## 2019-05-22 PROCEDURE — 74011250637 HC RX REV CODE- 250/637: Performed by: INTERNAL MEDICINE

## 2019-05-22 PROCEDURE — 85025 COMPLETE CBC W/AUTO DIFF WBC: CPT

## 2019-05-22 PROCEDURE — 83735 ASSAY OF MAGNESIUM: CPT

## 2019-05-22 PROCEDURE — 80048 BASIC METABOLIC PNL TOTAL CA: CPT

## 2019-05-22 PROCEDURE — 36415 COLL VENOUS BLD VENIPUNCTURE: CPT

## 2019-05-22 PROCEDURE — 84100 ASSAY OF PHOSPHORUS: CPT

## 2019-05-22 RX ORDER — CLONAZEPAM 2 MG/1
4 TABLET ORAL
Qty: 9 TAB | Refills: 0 | Status: ON HOLD | OUTPATIENT
Start: 2019-05-22 | End: 2019-06-17 | Stop reason: SDUPTHER

## 2019-05-22 RX ORDER — TAMSULOSIN HYDROCHLORIDE 0.4 MG/1
0.4 CAPSULE ORAL DAILY
COMMUNITY
End: 2019-07-31

## 2019-05-22 RX ORDER — CEFPODOXIME PROXETIL 200 MG/1
200 TABLET, FILM COATED ORAL EVERY 12 HOURS
Qty: 14 TAB | Refills: 0 | Status: SHIPPED
Start: 2019-05-22 | End: 2019-05-29

## 2019-05-22 RX ADMIN — ZOLPIDEM TARTRATE 5 MG: 5 TABLET ORAL at 02:31

## 2019-05-22 RX ADMIN — Medication 10 ML: at 06:54

## 2019-05-22 RX ADMIN — OLANZAPINE 5 MG: 5 TABLET, FILM COATED ORAL at 02:31

## 2019-05-22 RX ADMIN — RIVAROXABAN 15 MG: 15 TABLET, FILM COATED ORAL at 09:07

## 2019-05-22 RX ADMIN — LEVOTHYROXINE SODIUM 125 MCG: 125 TABLET ORAL at 06:44

## 2019-05-22 RX ADMIN — CEFPODOXIME PROXETIL 200 MG: 200 TABLET, FILM COATED ORAL at 09:07

## 2019-05-22 NOTE — PROGRESS NOTES
Patient will discharge from hospital today, returning to [facility] to resume STR.     Plan Communicate with SNF Coordinator

## 2019-05-22 NOTE — DISCHARGE SUMMARY
Hospitalist Discharge Summary     Patient ID:  Romario Kim  [id number]  41 y.o.  1945  Admit date: 5/19/2019 11:26 AM  Discharge date and time: 5/22/2019  Attending: Derek Frederick DO  PCP:  Nichole Mcnamara DO  Treatment Team: Attending Provider: Derek Frederick DO; Surgeon: Alfredo Gonzalez MD; Utilization Review: Jacklynn Litten; Consulting Provider: Keny Gaona DO; Care Manager: Mindy Patton RN    Principal Diagnosis Acute blood loss anemia    Hospital Problems as of 5/22/2019 Date Reviewed: 12/17/2018          Codes Class Noted - Resolved POA    * (Principal) Acute blood loss anemia ICD-10-CM: D62  ICD-9-CM: 285.1  5/21/2019 - Present Yes        Acute deep vein thrombosis (DVT) of non-extremity vein ICD-10-CM: I82.90  ICD-9-CM: 453.9  5/20/2019 - Present Yes        DVT (deep vein thrombosis) in pregnancy [facility]) ICD-10-CM: O22.30, I82.409  ICD-9-CM: 671.30  5/20/2019 - Present         Malnutrition ([facility] ICD-10-CM: E46  ICD-9-CM: 263.9  5/19/2019 - Present         On total parenteral nutrition (TPN) ICD-10-CM: Z78.9  ICD-9-CM: V49.89  5/19/2019 - Present Yes        Colostomy care ([facility] ICD-10-CM: Z43.3  ICD-9-CM: V55.3  5/19/2019 - Present Yes        Chronic indwelling Bishop catheter ICD-10-CM: Z96.0  ICD-9-CM: V45.89  5/19/2019 - Present Yes        Moderate protein-calorie malnutrition ([facility] ICD-10-CM: E44.0  ICD-9-CM: 263.0  5/19/2019 - Present Yes        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  ICD-9-CM: 569.81  4/29/2019 - Present Yes        Attention to ileostomy [facility]) ICD-10-CM: Z43.2  ICD-9-CM: V55.2  8/20/2014 - Present Yes        Dehydration ICD-10-CM: E86.0  ICD-9-CM: 276.51  4/17/2014 - Present Yes        Rectal cancer ([facility] ICD-10-CM: C20  ICD-9-CM: 154.1  10/22/2013 - Present Yes                  Hospital Course:  74year old CM with a PMH of 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 He was admitted for tunneled subclavian CVC. He was found to have a DVT in his IJ where his previous CVC was located so he was started on a Heparin drip. He then developed hematuria and his Hgb dropped to 6.8. He was given 2U PRBC and his Hgb came to 9.7. He was started on Xarelto. His Hgb remained stable and he was discharged back to [facility]. Significant Diagnostic Studies:    Labs: Results:       Chemistry Recent Labs     05/22/19  0531 05/21/19  0547 05/20/19  0503 05/19/19  1615   * 115* 106* 74    138 137 138   K 4.1 3.7 4.0 4.6    102 103 103   CO2 30 29 25 26   BUN 11 12 17 21   CREA 0.76* 0.72* 0.69* 0.71*   CA 8.8 8.4 9.0 8.7   AGAP 5* 7 9 9   AP  --  298* 361* 335*   TP  --  6.2* 7.0 7.2   ALB  --  2.2* 2.6* 2.4*   GLOB  --  4.0* 4.4* 4.8*   AGRAT  --  0.6* 0.6* 0.5*      CBC w/Diff Recent Labs     05/22/19  0531 05/21/19  2233 05/21/19  1414 05/21/19  0547  05/20/19  2207   WBC 4.4  --   --  3.4*  --  3.6*   RBC 3.59*  --   --  2.75*  --  2.92*   HGB 9.7* 9.5* 9.2* 7.0*   < > 7.6*   HCT 31.3* 30.3* 30.5* 23.8*   < > 25.3*     --   --  233  --  218   GRANS 79*  --   --  75  --  77   LYMPH 8*  --   --  9*  --  7*   EOS 2  --   --  2  --  1    < > = values in this interval not displayed. Cardiac Enzymes No results for input(s): CPK, CKND1, REYES in the last 72 hours. No lab exists for component: CKRMB, TROIP   Coagulation Recent Labs     05/21/19  0547 05/20/19  2207  05/20/19  0503   PTP  --   --   --  15.0*   INR  --   --   --  1.2   APTT 38.8 42.4*   < > 36.9    < > = values in this interval not displayed.        Lipid Panel Lab Results   Component Value Date/Time    Cholesterol, total 117 05/20/2019 05:03 AM    HDL Cholesterol 28 (L) 05/20/2019 05:03 AM    LDL, calculated 69.6 05/20/2019 05:03 AM    VLDL, calculated 19.4 05/20/2019 05:03 AM    Triglyceride 64 05/22/2019 05:31 AM    CHOL/HDL Ratio 4.2 05/20/2019 05:03 AM      BNP No results for input(s): BNPP in the last 72 hours. Liver Enzymes Recent Labs     05/21/19  0547   TP 6.2*   ALB 2.2*   *   SGOT 22      Thyroid Studies Lab Results   Component Value Date/Time    T4, Total 9.1 02/28/2017 02:31 PM    TSH 1.420 05/20/2019 05:03 AM            Imaging:  Ir Insert Irven Pierson Cvc W/o Port Over 5 Yr    Result Date: 5/20/2019  Impression: Technically successful tunneled central venous catheter placement. Note is made of some acute appearing thrombus in the right internal jugular vein. Clinical significance is uncertain. Plan: Recover for 1 hour. Catheter is ready for use. Suture should be removed in 2 weeks. Microbiology/Cultures: All Micro Results     Procedure Component Value Units Date/Time    CULTURE, URINE [[id number]]  (Abnormal) Collected:  05/21/19 0413    Order Status:  Completed Specimen:  Urine from Bishop Specimen Updated:  05/22/19 0923     Special Requests: COLLECTED IN GREY TUBE     Culture result:       >100,000  COLONIES/mL  PRESUMPTIVE ENTEROCOCCUS SPECIES  IDENTIFICATION AND SUSCEPTIBILITY TO FOLLOW      CULTURE, URINE [[id number]]     Order Status:  Canceled Specimen:  Urine from Bishop Specimen     MRSA SCREEN - PCR (NASAL) [[id number]] Collected:  05/19/19 2022    Order Status:  Completed Specimen:  Nasal Updated:  05/19/19 2203     Special Requests: NO SPECIAL REQUESTS        Culture result:       SA target not detected. A MRSA NEGATIVE, SA NEGATIVE test result does not preclude MRSA or SA nasal colonization.                 Discharge Exam:  Visit Vitals  /73 (BP 1 Location: Right arm, BP Patient Position: At rest)   Pulse 84   Temp 98.4 °F (36.9 °C)   Resp 18   Ht 5' 8.5\" (1.74 m)   Wt 58.1 kg (128 lb 1.6 oz)   SpO2 98%   BMI 19.19 kg/m²     General appearance: alert, cooperative, no distress, appears stated age  Lungs: clear to auscultation bilaterally  Heart: regular rate and rhythm, S1, S2 normal, no murmur, click, rub or gallop  Abdomen: soft, non-tender. Bowel sounds normal. No masses,  no organomegaly  Extremities: no cyanosis or edema  Neurologic: Grossly normal    Disposition: Rehab  Discharge Condition: stable  Patient Instructions:   Current Discharge Medication List      START taking these medications    Details   cefpodoxime (VANTIN) 200 mg tablet Take 1 Tab by mouth every twelve (12) hours for 7 days. Qty: 14 Tab, Refills: 0      !! rivaroxaban (XARELTO) 15 mg tab tablet Take 1 Tab by mouth two (2) times daily (with meals) for 20 days. Qty: 40 Tab, Refills: 0      !! rivaroxaban (XARELTO) 20 mg tab tablet Take 1 Tab by mouth daily (with dinner). Qty: 30 Tab, Refills: 1       !! - Potential duplicate medications found. Please discuss with provider. CONTINUE these medications which have CHANGED    Details   clonazePAM (KLONOPIN) 2 mg tablet Take 2 Tabs by mouth nightly. Max Daily Amount: 4 mg. Indications: takes for sleep  Qty: 9 Tab, Refills: 0    Associated Diagnoses: Primary insomnia         CONTINUE these medications which have NOT CHANGED    Details   tamsulosin (FLOMAX) 0.4 mg capsule Take 0.4 mg by mouth daily. OLANZapine (ZYPREXA) 2.5 mg tablet Take 2.5 mg by mouth nightly. Indications: TAKES 3 PILLS qHS      levothyroxine (SYNTHROID) 125 mcg tablet TAKE ONE TABLET BY MOUTH ONE TIME DAILY BEFORE BREAKFAST  Qty: 90 Tab, Refills: 3    Associated Diagnoses: Acquired hypothyroidism      Cholecalciferol, Vitamin D3, (VITAMIN D3) 2,000 unit cap capsule Take 2,000 Units by mouth two (2) times a day. Qty: 180 Cap, Refills: 3      amitriptyline (ELAVIL) 100 mg tablet Take 100 mg by mouth nightly.  Dr Edu Thompson psych  Indications: takes for sleep             Activity: Activity as tolerated  Diet: Clear liquids sips, TPN  Wound Care: As directed    Follow-up  ·   Dr. Porter Fleischer in one week  · Dr. Pankaj Bradhsaw in 2-3 weeks  Time spent to discharge patient 35 minutes  Signed:  Maria Del Rosario Gonzalez DO  5/22/2019  8:49 AM

## 2019-05-22 NOTE — PROGRESS NOTES
TRANSFER - OUT REPORT:    Verbal report given to Lopez (name) on Braulio Hemphill  being transferred to [place] (unit) for routine progression of care       Report consisted of patients Situation, Background, Assessment and   Recommendations(SBAR). Information from the following report(s) SBAR was reviewed with the receiving nurse. Lines:   Single Lumen Venous Catheter Bard Power Line 05/20/19 Left Internal jugular (Active)   Central Line Being Utilized Yes 5/22/2019  9:10 AM   Criteria for Appropriate Use Total parenteral nutrition 5/22/2019  9:10 AM   Site Assessment Clean, dry, & intact 5/22/2019  9:10 AM   Infiltration Assessment 0 5/22/2019  9:10 AM   Date of Last Dressing Change 05/20/19 5/22/2019  9:10 AM   Dressing Status Clean, dry, & intact 5/22/2019  3:00 AM   Dressing Type Disk with Chlorhexadine gluconate (CHG); Transparent 5/22/2019  9:10 AM   Hub Color/Line Status Infusing;Patent 5/22/2019  3:00 AM        Opportunity for questions and clarification was provided.       Patient transported with: TPN

## 2019-05-22 NOTE — PROGRESS NOTES
Assessment completed and documented. Lung sounds clear. Heart sounds regular. Ostomy stoma pink. Small amount of stool in ostomy bag. Fistula site pink. Small amount of green drainage noted in bag. No complaints of pain or nausea. No edema noted. States abdomen is no longer tender. Currently resting in bed. IJ infusing without difficulty. Will continue to monitor with hourly rounding.

## 2019-05-22 NOTE — PROGRESS NOTES
Shift assessment complete. A&OX4. Lungs clear on auscultation. Respirations present. Even and unlabored. No s/s of distress. No c/o pain at this time. Call light within reach. Encouraged patient to call for assistance. Patient verbalizes understanding. See Doc Flowsheet for assessment details. Patient resting in bed. Will continue to onitorm  Bed alarm in progress. Door open for observation.

## 2019-05-22 NOTE — PROGRESS NOTES
Problem: Falls - Risk of  Goal: *Absence of Falls  Description  Document Clide Failing Fall Risk and appropriate interventions in the flowsheet. Outcome: Progressing Towards Goal     Problem: Patient Education: Go to Patient Education Activity  Goal: Patient/Family Education  Outcome: Progressing Towards Goal     Problem: Patient Education: Go to Patient Education Activity  Goal: Patient/Family Education  Description  Pt/caregiver will demonstrate and verbalize good understanding of OT recommendations regarding ADL status, functional transfer status, home safety, DME, AE, energy conservation techniques, follow-up therapy, for increasing safety with functional tasks upon discharge.      Outcome: Progressing Towards Goal     Problem: Nutrition Deficit  Goal: *Optimize nutritional status  Outcome: Progressing Towards Goal     Problem: Nutrition Deficit  Goal: *Optimize nutritional status  Outcome: Progressing Towards Goal

## 2019-05-23 LAB
BACTERIA SPEC CULT: ABNORMAL
SERVICE CMNT-IMP: ABNORMAL

## 2019-05-25 ENCOUNTER — HOSPITAL ENCOUNTER (OUTPATIENT)
Dept: LAB | Age: 74
Discharge: HOME | End: 2019-05-25

## 2019-05-25 LAB
BASOPHILS # BLD: 0 K/UL (ref 0–0.2)
BASOPHILS NFR BLD: 1 % (ref 0–2)
DIFFERENTIAL METHOD BLD: ABNORMAL
EOSINOPHIL # BLD: 0.1 K/UL (ref 0–0.8)
EOSINOPHIL NFR BLD: 2 % (ref 0.5–7.8)
ERYTHROCYTE [DISTWIDTH] IN BLOOD BY AUTOMATED COUNT: 15.3 % (ref 11.9–14.6)
HCT VFR BLD AUTO: 32.7 % (ref 41.1–50.3)
HGB BLD-MCNC: 9.8 G/DL (ref 13.6–17.2)
IMM GRANULOCYTES # BLD AUTO: 0 K/UL (ref 0–0.5)
IMM GRANULOCYTES NFR BLD AUTO: 1 % (ref 0–5)
LYMPHOCYTES # BLD: 0.4 K/UL (ref 0.5–4.6)
LYMPHOCYTES NFR BLD: 10 % (ref 13–44)
MCH RBC QN AUTO: 27.2 PG (ref 26.1–32.9)
MCHC RBC AUTO-ENTMCNC: 30 G/DL (ref 31.4–35)
MCV RBC AUTO: 90.8 FL (ref 79.6–97.8)
MONOCYTES # BLD: 0.4 K/UL (ref 0.1–1.3)
MONOCYTES NFR BLD: 11 % (ref 4–12)
NEUTS SEG # BLD: 2.9 K/UL (ref 1.7–8.2)
NEUTS SEG NFR BLD: 77 % (ref 43–78)
NRBC # BLD: 0 K/UL (ref 0–0.2)
PLATELET # BLD AUTO: 270 K/UL (ref 150–450)
PMV BLD AUTO: 10.1 FL (ref 9.4–12.3)
RBC # BLD AUTO: 3.6 M/UL (ref 4.23–5.6)
WBC # BLD AUTO: 3.8 K/UL (ref 4.3–11.1)

## 2019-05-25 PROCEDURE — 85025 COMPLETE CBC W/AUTO DIFF WBC: CPT

## 2019-05-30 ENCOUNTER — HOSPITAL ENCOUNTER (OUTPATIENT)
Dept: LAB | Age: 74
Discharge: HOME | End: 2019-05-30

## 2019-05-30 ENCOUNTER — HOSPITAL ENCOUNTER (EMERGENCY)
Age: 74
Discharge: HOME | End: 2019-05-30
Attending: EMERGENCY MEDICINE
Payer: MEDICARE

## 2019-05-30 ENCOUNTER — APPOINTMENT (OUTPATIENT)
Dept: INTERVENTIONAL RADIOLOGY/VASCULAR | Age: 74
End: 2019-05-30
Attending: RADIOLOGY
Payer: MEDICARE

## 2019-05-30 ENCOUNTER — APPOINTMENT (OUTPATIENT)
Dept: GENERAL RADIOLOGY | Age: 74
End: 2019-05-30
Attending: EMERGENCY MEDICINE
Payer: MEDICARE

## 2019-05-30 VITALS
HEART RATE: 103 BPM | HEIGHT: 69 IN | WEIGHT: 116 LBS | RESPIRATION RATE: 16 BRPM | SYSTOLIC BLOOD PRESSURE: 136 MMHG | DIASTOLIC BLOOD PRESSURE: 83 MMHG | OXYGEN SATURATION: 100 % | TEMPERATURE: 98.1 F | BODY MASS INDEX: 17.18 KG/M2

## 2019-05-30 DIAGNOSIS — T82.528A DISPLACEMENT OF CENTRAL VENOUS CATHETER (CVC) (HCC): Primary | ICD-10-CM

## 2019-05-30 LAB
ANION GAP SERPL CALC-SCNC: 6 MMOL/L (ref 7–16)
BASOPHILS # BLD: 0 K/UL (ref 0–0.2)
BASOPHILS NFR BLD: 0 % (ref 0–2)
BUN SERPL-MCNC: 20 MG/DL (ref 8–23)
CALCIUM SERPL-MCNC: 8.6 MG/DL (ref 8.3–10.4)
CHLORIDE SERPL-SCNC: 104 MMOL/L (ref 98–107)
CO2 SERPL-SCNC: 29 MMOL/L (ref 21–32)
CREAT SERPL-MCNC: 0.68 MG/DL (ref 0.8–1.5)
DIFFERENTIAL METHOD BLD: ABNORMAL
EOSINOPHIL # BLD: 0.1 K/UL (ref 0–0.8)
EOSINOPHIL NFR BLD: 1 % (ref 0.5–7.8)
ERYTHROCYTE [DISTWIDTH] IN BLOOD BY AUTOMATED COUNT: 15.3 % (ref 11.9–14.6)
GLUCOSE SERPL-MCNC: 89 MG/DL (ref 65–100)
HCT VFR BLD AUTO: 32.7 % (ref 41.1–50.3)
HGB BLD-MCNC: 9.8 G/DL (ref 13.6–17.2)
IMM GRANULOCYTES # BLD AUTO: 0 K/UL (ref 0–0.5)
IMM GRANULOCYTES NFR BLD AUTO: 0 % (ref 0–5)
LYMPHOCYTES # BLD: 0.4 K/UL (ref 0.5–4.6)
LYMPHOCYTES NFR BLD: 9 % (ref 13–44)
MAGNESIUM SERPL-MCNC: 1.8 MG/DL (ref 1.8–2.4)
MCH RBC QN AUTO: 26.9 PG (ref 26.1–32.9)
MCHC RBC AUTO-ENTMCNC: 30 G/DL (ref 31.4–35)
MCV RBC AUTO: 89.8 FL (ref 79.6–97.8)
MONOCYTES # BLD: 0.5 K/UL (ref 0.1–1.3)
MONOCYTES NFR BLD: 10 % (ref 4–12)
NEUTS SEG # BLD: 3.7 K/UL (ref 1.7–8.2)
NEUTS SEG NFR BLD: 79 % (ref 43–78)
NRBC # BLD: 0 K/UL (ref 0–0.2)
PHOSPHATE SERPL-MCNC: 3 MG/DL (ref 2.3–3.7)
PLATELET # BLD AUTO: 258 K/UL (ref 150–450)
PMV BLD AUTO: 11.1 FL (ref 9.4–12.3)
POTASSIUM SERPL-SCNC: 4.1 MMOL/L (ref 3.5–5.1)
RBC # BLD AUTO: 3.64 M/UL (ref 4.23–5.6)
SODIUM SERPL-SCNC: 139 MMOL/L (ref 136–145)
WBC # BLD AUTO: 4.7 K/UL (ref 4.3–11.1)

## 2019-05-30 PROCEDURE — 36558 INSERT TUNNELED CV CATH: CPT

## 2019-05-30 PROCEDURE — C1751 CATH, INF, PER/CENT/MIDLINE: HCPCS

## 2019-05-30 PROCEDURE — 83735 ASSAY OF MAGNESIUM: CPT

## 2019-05-30 PROCEDURE — 80048 BASIC METABOLIC PNL TOTAL CA: CPT

## 2019-05-30 PROCEDURE — 77030018719 HC DRSG PTCH ANTIMIC J&J -A

## 2019-05-30 PROCEDURE — 36581 REPLACE TUNNELED CV CATH: CPT

## 2019-05-30 PROCEDURE — 77001 FLUOROGUIDE FOR VEIN DEVICE: CPT

## 2019-05-30 PROCEDURE — C1769 GUIDE WIRE: HCPCS

## 2019-05-30 PROCEDURE — 85025 COMPLETE CBC W/AUTO DIFF WBC: CPT

## 2019-05-30 PROCEDURE — 74011000250 HC RX REV CODE- 250: Performed by: RADIOLOGY

## 2019-05-30 PROCEDURE — 84100 ASSAY OF PHOSPHORUS: CPT

## 2019-05-30 PROCEDURE — 77030002916 HC SUT ETHLN J&J -A

## 2019-05-30 PROCEDURE — 99283 EMERGENCY DEPT VISIT LOW MDM: CPT | Performed by: EMERGENCY MEDICINE

## 2019-05-30 PROCEDURE — 74011250636 HC RX REV CODE- 250/636: Performed by: RADIOLOGY

## 2019-05-30 RX ORDER — LIDOCAINE HYDROCHLORIDE 20 MG/ML
20-400 INJECTION, SOLUTION INFILTRATION; PERINEURAL ONCE
Status: COMPLETED | OUTPATIENT
Start: 2019-05-30 | End: 2019-05-30

## 2019-05-30 RX ADMIN — LIDOCAINE HYDROCHLORIDE 200 MG: 20 INJECTION, SOLUTION INFILTRATION; PERINEURAL at 17:07

## 2019-05-30 RX ADMIN — LIDOCAINE HYDROCHLORIDE 100 MG: 10; .005 INJECTION, SOLUTION EPIDURAL; INFILTRATION; INTRACAUDAL; PERINEURAL at 17:07

## 2019-05-30 NOTE — ED TRIAGE NOTES
Pt arrived via EMS from [facility]. His central line was partially pulled out. It is currently secured with tape. Pt is alert and oriented. Pt has a brito catheter with dark red urine, which is a chronic issue and following up with urology in the morning.  VSS

## 2019-05-30 NOTE — ED PROVIDER NOTES
[facility] Emergency Department  Arrival Date/Time: 5/30/2019  4:23 PM      Alissa Marroquin  MRN: [id number]    YOB: 1945   74 y.o. male    [facility] EMERGENCY DEPT ERP/P  Seen on 5/30/2019 @ 3:44 PM      TRIAGE Provider NOTE:  68-year-old male coming in from [address] [place] [address] after having accidentally partially pulled back a central line. He states that he was trying to pull the tape off prior to getting the shower when he actually pulled the catheter. He is on TPN for nutrition due to a history of rectal cancer with enterocutaneous fistula. On review the medical record it looks that presented with a similar presentation on May 19 where he actually dislodged his catheter. 4:05 PM  Paged PICC team, they are at [facility] but are available to place one later this evening necessary. I then called interventional radiology who placed the initial line. I explained the situation and they stated they may be able to salvage this line. They will evaluate the patient and make a determination. Blaire Santos MD; 5/30/2019 @3:44 PM============================     Jose Hewitt is a 74 y.o. male seen on 5/30/2019 at 3:44 PM in the [facility] EMERGENCY DEPT     HPI: HPI   Please see triage note for HPI. No additional history. Review of Systems: Review of Systems   Constitutional: Negative for fatigue and fever. HENT: Negative. Eyes: Negative. Respiratory: Negative for cough, chest tightness, shortness of breath and wheezing. Cardiovascular: Negative for chest pain. Gastrointestinal: Negative for abdominal distention, abdominal pain, diarrhea, nausea and vomiting. Genitourinary: Negative for dysuria, flank pain, frequency, genital sores and urgency. Musculoskeletal: Negative. Skin: Negative for rash. Neurological: Negative for dizziness, syncope and headaches. All other systems reviewed and are negative.        PAST MEDICAL HISTORY:  Primary Care Doctor: Kaylin Flores, [place] [phone]  Past Medical History:   Diagnosis Date    Acute deep vein thrombosis (DVT) of non-extremity vein 5/20/2019    Family history of malignant neoplasm of gastrointestinal tract     mother colon/ovarian cancer 67    Fecal incontinence     LAR syndrome    Former cigarette smoker     History of rectal cancer     Hypothyroid     stable w/med    Infection and inflammatory reaction due to other internal prosthetic devices, implants and grafts, subsequent encounter 2017    abd wound/mesh colostomy    Insomnia     takes meds    Osteoarthritis, hand     Personal history of colonic polyps 9/2013    x 1    Personal history of malignant neoplasm of rectum, rectosigmoid junction, and anus 9/2013    Psychiatric disorder     anxiety     Past Surgical History:   Procedure Laterality Date    COLONOSCOPY N/A 2/12/2018    COLONOSCOPY / BMI=21 performed by Comfort Viera MD at [address] [place] [address]  2/12/2018         ENDOSCOPY, COLON, DIAGNOSTIC  last 2/3/15    [facility]--no polyps--3 year recall    HX COLECTOMY  11/2013    [facility]ily--lap LAR with coloproctostomy, mobilization splenic flexure, diverting loop ileostomy    HX COLECTOMY Right 8/2014    for leak    HX COLOSTOMY  5/11/16    HX GI  4/2016    Sacral nerve stimlator lead trial (unsucessful) and removal    HX HERNIA REPAIR  3/2015, 5/2016    HX HERNIA REPAIR      and Colostomy in May    HX OTHER SURGICAL  10/7/2013    Emily---endorectal us    HX OTHER SURGICAL Bilateral     cataracts  2017    HX POLYPECTOMY      HX VASCULAR ACCESS Left 4/14    single lumen port/subsequently removed    IR INSERT TUNL CVC W/O PORT OVER 5 YR  5/20/2019    IR INSERT TUNL CVC W/O PORT OVER 5 YR  5/30/2019     Social History     Socioeconomic History    Marital status: SINGLE     Spouse name: Not on file    Number of children: Not on file    Years of education: Not on file    Highest education level: Not on file Tobacco Use    Smoking status: Former Smoker     Last attempt to quit: 1963     Years since quittin.5    Smokeless tobacco: Never Used   Substance and Sexual Activity    Alcohol use: Not Currently     Alcohol/week: 7.0 oz     Types: 14 Cans of beer per week     Comment: advised stop drinking given issues    Drug use: No    Sexual activity: Never     Cannot display prior to admission medications because the patient has not been admitted in this contact. No Known Allergies      Physical Exam:  Nursing documentation reviewed. Vitals:    19 1542 19 1626   BP: 115/73 136/83   Pulse: 94 (!) 103   Resp: 16 16   Temp: 97.3 °F (36.3 °C) 98.1 °F (36.7 °C)   SpO2: 98% 100%   Vital signs were reviewed. Physical Exam   Constitutional: He is oriented to person, place, and time. He appears well-developed. No distress. Very thin   HENT:   Head: Normocephalic and atraumatic. Eyes: Pupils are equal, round, and reactive to light. EOM are normal.   Neck: Normal range of motion. Neck supple. Cardiovascular: Normal rate, regular rhythm, normal heart sounds and intact distal pulses. Exam reveals no gallop and no friction rub. No murmur heard. Pulmonary/Chest: Effort normal and breath sounds normal. No respiratory distress. He has no wheezes. tunneled central catheter in the left anterior chest appears to have been pulled out approximately 8 cm under tegaderm   Abdominal: Soft. Bowel sounds are normal. He exhibits no distension and no mass. There is no tenderness. There is no rebound and no guarding. Musculoskeletal: Normal range of motion. He exhibits no edema, tenderness or deformity. Neurological: He is alert and oriented to person, place, and time. No sensory deficit. Coordination normal.   No focal deficits   Skin: Skin is warm. No rash noted. He is not diaphoretic. No erythema. Psychiatric: He has a normal mood and affect.  His behavior is normal. Thought content normal. Vitals reviewed. Medical Decision Making  MDM  Number of Diagnoses or Management Options  Displacement of central venous catheter (CVC) [facility]):   Risk of Complications, Morbidity, and/or Mortality  Presenting problems: high  Diagnostic procedures: high  Management options: high          Procedures     ED Evaluation:  LABS:   Recent Results (from the past 24 hour(s))   CBC WITH AUTOMATED DIFF    Collection Time: 05/30/19 10:55 AM   Result Value Ref Range    WBC 4.7 4.3 - 11.1 K/uL    RBC 3.64 (L) 4.23 - 5.6 M/uL    HGB 9.8 (L) 13.6 - 17.2 g/dL    HCT 32.7 (L) 41.1 - 50.3 %    MCV 89.8 79.6 - 97.8 FL    MCH 26.9 26.1 - 32.9 PG    MCHC 30.0 (L) 31.4 - 35.0 g/dL    RDW 15.3 (H) 11.9 - 14.6 %    PLATELET 865 179 - 444 K/uL    MPV 11.1 9.4 - 12.3 FL    ABSOLUTE NRBC 0.00 0.0 - 0.2 K/uL    DF AUTOMATED      NEUTROPHILS 79 (H) 43 - 78 %    LYMPHOCYTES 9 (L) 13 - 44 %    MONOCYTES 10 4.0 - 12.0 %    EOSINOPHILS 1 0.5 - 7.8 %    BASOPHILS 0 0.0 - 2.0 %    IMMATURE GRANULOCYTES 0 0.0 - 5.0 %    ABS. NEUTROPHILS 3.7 1.7 - 8.2 K/UL    ABS. LYMPHOCYTES 0.4 (L) 0.5 - 4.6 K/UL    ABS. MONOCYTES 0.5 0.1 - 1.3 K/UL    ABS. EOSINOPHILS 0.1 0.0 - 0.8 K/UL    ABS. BASOPHILS 0.0 0.0 - 0.2 K/UL    ABS. IMM.  GRANS. 0.0 0.0 - 0.5 K/UL   METABOLIC PANEL, BASIC    Collection Time: 05/30/19 10:55 AM   Result Value Ref Range    Sodium 139 136 - 145 mmol/L    Potassium 4.1 3.5 - 5.1 mmol/L    Chloride 104 98 - 107 mmol/L    CO2 29 21 - 32 mmol/L    Anion gap 6 (L) 7 - 16 mmol/L    Glucose 89 65 - 100 mg/dL    BUN 20 8 - 23 MG/DL    Creatinine 0.68 (L) 0.8 - 1.5 MG/DL    GFR est AA >60 >60 ml/min/1.73m2    GFR est non-AA >60 >60 ml/min/1.73m2    Calcium 8.6 8.3 - 10.4 MG/DL   MAGNESIUM    Collection Time: 05/30/19 10:55 AM   Result Value Ref Range    Magnesium 1.8 1.8 - 2.4 mg/dL   PHOSPHORUS    Collection Time: 05/30/19 10:55 AM   Result Value Ref Range    Phosphorus 3.0 2.3 - 3.7 MG/DL        RADIOLOGY:   IR INSERT TUNL CVC W/O PORT OVER 5 YR   Final Result   Impression: Technically successful tunneled power injectable central venous   catheter replacement. Plan: Return to the emergency department. Anticipate discharge back to his   skilled nursing facility in short order. Catheter is ready for use. Suture   should be removed in 2 weeks. Recommend covering the catheter with Tegaderm. Recommend replacing the bandage every 3 days and as necessary. XR CHEST PA LAT    (Results Pending)     4:39 PM   Pt in IR right now to attempt to salvage the line. 5:25 PM  Discussed the case with interventional radiology. They were able to replace the catheter over a wire. The patient now has his tunneled catheter in place and be discharged home. He is okay to receive his TPN.     Disposition: Discharge home  Diagnosis: displacement of central venous catheter  _____________________________________________________________________

## 2019-05-30 NOTE — DISCHARGE INSTRUCTIONS
[address]  Department of Interventional Radiology  [facility]  [phone] Office  [phone] Fax    Tunneled or Non Tunneled Catheter Discharge Instructions    General Information:   A tunneled (permanent)  catheter was inserted into your neck today for the purpose of TPN or antibiotic therapy. Your catheter will be used for the length of your treatment. After this time, your catheter may be removed. You may return to our department for the catheter removal.   doctor. A tunneled catheter will exit lower down on the chest wall, and can be used for a longer period of time. Home Care Instructions: You can resume your regular diet. Do not drink alcohol, drive, or make any important legal decisions in the next 24 hours. Watch the site carefully for signs of infection, like fever, drainage, redness, and/or swelling. Your catheter should be \"packed\" with heparin after each use or at least once a week if it is not being used. This \"packing\" should only be done by nurses experienced with caring for this type of catheter. You may shower in 24 hours, but you need to cover the catheter with plastic wrap and tape to keep it dry while you are showering. Keep the site clean, dry, and protected. Do not immerse yourself in water like in the case of tub baths or swimming as long as you have the catheter. Call If: You should call your Physician and/or the Radiology Nurse if you have any signs of infection, shortness of breath, or if the dressing should come off or become moist.  You will be instructed on where to go for a new dressing. You should not have to change the dressings yourself, as that will be done by the nurses who access the catheter. Follow-Up Instructions:  Please see your ordering doctor as he/she has requested. To Reach Us:  If you have any questions about your procedure, please call the Interventional Radiology department at [phone]. After business hours (5pm) and weekends, call the answering service at [phone] and ask for the Radiologist on call to be paged. Interventional Radiology General Nurse Discharge    After general anesthesia or intravenous sedation, for 24 hours or while taking prescription Narcotics:  · Limit your activities  · Do not drive and operate hazardous machinery  · Do not make important personal or business decisions  · Do  not drink alcoholic beverages  · If you have not urinated within 8 hours after discharge, please contact your surgeon on call. * Please give a list of your current medications to your Primary Care Provider. * Please update this list whenever your medications are discontinued, doses are     changed, or new medications (including over-the-counter products) are added. * Please carry medication information at all times in case of emergency situations. These are general instructions for a healthy lifestyle:    No smoking/ No tobacco products/ Avoid exposure to second hand smoke  Surgeon General's Warning:  Quitting smoking now greatly reduces serious risk to your health. Obesity, smoking, and sedentary lifestyle greatly increases your risk for illness  A healthy diet, regular physical exercise & weight monitoring are important for maintaining a healthy lifestyle    You may be retaining fluid if you have a history of heart failure or if you experience any of the following symptoms:  Weight gain of 3 pounds or more overnight or 5 pounds in a week, increased swelling in our hands or feet or shortness of breath while lying flat in bed. Please call your doctor as soon as you notice any of these symptoms; do not wait until your next office visit.     Recognize signs and symptoms of STROKE:  F-face looks uneven    A-arms unable to move or move unevenly    S-speech slurred or non-existent    T-time-call 911 as soon as signs and symptoms begin-DO NOT go       Back to bed or wait to see if you get better-TIME IS BRAIN.       Patient Signature:  Date: 5/30/2019  Discharging Nurse: Paolo Carvajal RN

## 2019-05-31 ENCOUNTER — PATIENT OUTREACH (OUTPATIENT)
Dept: CASE MANAGEMENT | Age: 74
End: 2019-05-31

## 2019-05-31 NOTE — PROGRESS NOTES
Community Care Team documentation for patient in [facility]    The information below provided by:Naif Ashraf [facility]    PT Update: Only on PT; Ambulation @200 Ft with Sup; Standing Tolerance @ 5 minutes; Stair Training @ Sup.; Level Surface with No AD @ 200 ft; 400ft Sup and verbal cues for gait. d/c 6/5 with therapy. Cont. with nursing        Nursing Update:TPN. Remains with brito.  Remains on celpodoxine for UTI      Discharge Date:TBD      Assign to [facility] Manager:

## 2019-06-10 ENCOUNTER — PATIENT OUTREACH (OUTPATIENT)
Dept: CASE MANAGEMENT | Age: 74
End: 2019-06-10

## 2019-06-10 NOTE — PROGRESS NOTES
Community Care Team documentation for patient in [facility]    The information below provided by:Naif Ashraf    PT Update: PT discharge on 6/5 with continued Nursing Care;  Ambulation @ 500 ft with Sup; Standing tolerance @ 12 minutes and Ind/Sup; Safety awareness with Sup for mobility tasks, ambulation, and stair navigation        Nursing Update:No issues voiding since brito removed      Discharge Date:TBD      Assign to [facility]

## 2019-06-13 ENCOUNTER — HOSPITAL ENCOUNTER (INPATIENT)
Age: 74
LOS: 6 days | Discharge: NURSING, REHAB OR LONG-TERM CARE | DRG: 871 | End: 2019-06-19
Attending: EMERGENCY MEDICINE | Admitting: HOSPITALIST
Payer: MEDICARE

## 2019-06-13 DIAGNOSIS — R50.9 FEVER, UNSPECIFIED FEVER CAUSE: Primary | ICD-10-CM

## 2019-06-13 DIAGNOSIS — F51.01 PRIMARY INSOMNIA: ICD-10-CM

## 2019-06-13 PROBLEM — N39.0 ACUTE UTI (URINARY TRACT INFECTION): Status: ACTIVE | Noted: 2019-06-13

## 2019-06-13 PROBLEM — R79.89 ELEVATED LFTS: Status: ACTIVE | Noted: 2019-06-13

## 2019-06-13 PROBLEM — D64.9 ANEMIA: Status: ACTIVE | Noted: 2019-06-13

## 2019-06-13 LAB
ALBUMIN SERPL-MCNC: 2.4 G/DL (ref 3.2–4.6)
ALBUMIN/GLOB SERPL: 0.6 {RATIO} (ref 1.2–3.5)
ALP SERPL-CCNC: 577 U/L (ref 50–136)
ALT SERPL-CCNC: 179 U/L (ref 12–65)
ANION GAP SERPL CALC-SCNC: 10 MMOL/L (ref 7–16)
APPEARANCE UR: ABNORMAL
AST SERPL-CCNC: 115 U/L (ref 15–37)
ATRIAL RATE: 117 BPM
BACTERIA URNS QL MICRO: ABNORMAL /HPF
BASOPHILS # BLD: 0 K/UL (ref 0–0.2)
BASOPHILS NFR BLD: 0 % (ref 0–2)
BILIRUB SERPL-MCNC: 3.2 MG/DL (ref 0.2–1.1)
BILIRUB UR QL: ABNORMAL
BUN SERPL-MCNC: 23 MG/DL (ref 8–23)
CALCIUM SERPL-MCNC: 8.3 MG/DL (ref 8.3–10.4)
CALCULATED P AXIS, ECG09: 64 DEGREES
CALCULATED R AXIS, ECG10: 70 DEGREES
CALCULATED T AXIS, ECG11: 36 DEGREES
CHLORIDE SERPL-SCNC: 100 MMOL/L (ref 98–107)
CO2 SERPL-SCNC: 23 MMOL/L (ref 21–32)
COLOR UR: ABNORMAL
CREAT SERPL-MCNC: 0.78 MG/DL (ref 0.8–1.5)
DIAGNOSIS, 93000: NORMAL
DIFFERENTIAL METHOD BLD: ABNORMAL
EOSINOPHIL # BLD: 0 K/UL (ref 0–0.8)
EOSINOPHIL NFR BLD: 0 % (ref 0.5–7.8)
EPI CELLS #/AREA URNS HPF: ABNORMAL /HPF
ERYTHROCYTE [DISTWIDTH] IN BLOOD BY AUTOMATED COUNT: 15.9 % (ref 11.9–14.6)
GLOBULIN SER CALC-MCNC: 4.1 G/DL (ref 2.3–3.5)
GLUCOSE SERPL-MCNC: 92 MG/DL (ref 65–100)
GLUCOSE UR STRIP.AUTO-MCNC: NEGATIVE MG/DL
HCT VFR BLD AUTO: 28 % (ref 41.1–50.3)
HGB BLD-MCNC: 9 G/DL (ref 13.6–17.2)
HGB UR QL STRIP: ABNORMAL
IMM GRANULOCYTES # BLD AUTO: 0 K/UL (ref 0–0.5)
IMM GRANULOCYTES NFR BLD AUTO: 0 % (ref 0–5)
KETONES UR QL STRIP.AUTO: ABNORMAL MG/DL
LACTATE BLD-SCNC: 1.23 MMOL/L (ref 0.5–1.9)
LEUKOCYTE ESTERASE UR QL STRIP.AUTO: ABNORMAL
LYMPHOCYTES # BLD: 0.3 K/UL (ref 0.5–4.6)
LYMPHOCYTES NFR BLD: 4 % (ref 13–44)
MCH RBC QN AUTO: 26.5 PG (ref 26.1–32.9)
MCHC RBC AUTO-ENTMCNC: 32.1 G/DL (ref 31.4–35)
MCV RBC AUTO: 82.4 FL (ref 79.6–97.8)
MONOCYTES # BLD: 0.4 K/UL (ref 0.1–1.3)
MONOCYTES NFR BLD: 7 % (ref 4–12)
NEUTS SEG # BLD: 6 K/UL (ref 1.7–8.2)
NEUTS SEG NFR BLD: 89 % (ref 43–78)
NITRITE UR QL STRIP.AUTO: NEGATIVE
NRBC # BLD: 0 K/UL (ref 0–0.2)
P-R INTERVAL, ECG05: 170 MS
PH UR STRIP: 5.5 [PH] (ref 5–9)
PLATELET # BLD AUTO: 127 K/UL (ref 150–450)
PMV BLD AUTO: 12.5 FL (ref 9.4–12.3)
POTASSIUM SERPL-SCNC: 3.7 MMOL/L (ref 3.5–5.1)
PROT SERPL-MCNC: 6.5 G/DL (ref 6.3–8.2)
PROT UR STRIP-MCNC: 100 MG/DL
Q-T INTERVAL, ECG07: 290 MS
QRS DURATION, ECG06: 92 MS
QTC CALCULATION (BEZET), ECG08: 404 MS
RBC # BLD AUTO: 3.4 M/UL (ref 4.23–5.6)
RBC #/AREA URNS HPF: ABNORMAL /HPF
SODIUM SERPL-SCNC: 133 MMOL/L (ref 136–145)
SP GR UR REFRACTOMETRY: 1.02 (ref 1–1.02)
UROBILINOGEN UR QL STRIP.AUTO: 1 EU/DL (ref 0.2–1)
VENTRICULAR RATE, ECG03: 117 BPM
WBC # BLD AUTO: 6.8 K/UL (ref 4.3–11.1)
WBC URNS QL MICRO: ABNORMAL /HPF

## 2019-06-13 PROCEDURE — 96360 HYDRATION IV INFUSION INIT: CPT | Performed by: EMERGENCY MEDICINE

## 2019-06-13 PROCEDURE — 93005 ELECTROCARDIOGRAM TRACING: CPT | Performed by: EMERGENCY MEDICINE

## 2019-06-13 PROCEDURE — 74011250637 HC RX REV CODE- 250/637: Performed by: HOSPITALIST

## 2019-06-13 PROCEDURE — 87040 BLOOD CULTURE FOR BACTERIA: CPT

## 2019-06-13 PROCEDURE — 81001 URINALYSIS AUTO W/SCOPE: CPT

## 2019-06-13 PROCEDURE — 65270000029 HC RM PRIVATE

## 2019-06-13 PROCEDURE — 87205 SMEAR GRAM STAIN: CPT

## 2019-06-13 PROCEDURE — 87088 URINE BACTERIA CULTURE: CPT

## 2019-06-13 PROCEDURE — 99285 EMERGENCY DEPT VISIT HI MDM: CPT | Performed by: EMERGENCY MEDICINE

## 2019-06-13 PROCEDURE — 87077 CULTURE AEROBIC IDENTIFY: CPT

## 2019-06-13 PROCEDURE — 83605 ASSAY OF LACTIC ACID: CPT

## 2019-06-13 PROCEDURE — 85025 COMPLETE CBC W/AUTO DIFF WBC: CPT

## 2019-06-13 PROCEDURE — 77030005518 HC CATH URETH FOL 2W BARD -B

## 2019-06-13 PROCEDURE — 87106 FUNGI IDENTIFICATION YEAST: CPT

## 2019-06-13 PROCEDURE — 74011000258 HC RX REV CODE- 258: Performed by: HOSPITALIST

## 2019-06-13 PROCEDURE — 87186 SC STD MICRODIL/AGAR DIL: CPT

## 2019-06-13 PROCEDURE — 87150 DNA/RNA AMPLIFIED PROBE: CPT

## 2019-06-13 PROCEDURE — 74011250637 HC RX REV CODE- 250/637: Performed by: EMERGENCY MEDICINE

## 2019-06-13 PROCEDURE — 87086 URINE CULTURE/COLONY COUNT: CPT

## 2019-06-13 PROCEDURE — 51701 INSERT BLADDER CATHETER: CPT | Performed by: EMERGENCY MEDICINE

## 2019-06-13 PROCEDURE — 80053 COMPREHEN METABOLIC PANEL: CPT

## 2019-06-13 PROCEDURE — 74011250636 HC RX REV CODE- 250/636: Performed by: EMERGENCY MEDICINE

## 2019-06-13 PROCEDURE — 74011250636 HC RX REV CODE- 250/636: Performed by: HOSPITALIST

## 2019-06-13 RX ORDER — ACETAMINOPHEN 500 MG
1000 TABLET ORAL
Status: COMPLETED | OUTPATIENT
Start: 2019-06-13 | End: 2019-06-13

## 2019-06-13 RX ORDER — TAMSULOSIN HYDROCHLORIDE 0.4 MG/1
0.4 CAPSULE ORAL DAILY
Status: DISCONTINUED | OUTPATIENT
Start: 2019-06-14 | End: 2019-06-19 | Stop reason: HOSPADM

## 2019-06-13 RX ORDER — SODIUM CHLORIDE 0.9 % (FLUSH) 0.9 %
5-40 SYRINGE (ML) INJECTION AS NEEDED
Status: DISCONTINUED | OUTPATIENT
Start: 2019-06-13 | End: 2019-06-19 | Stop reason: HOSPADM

## 2019-06-13 RX ORDER — MELATONIN
2000 2 TIMES DAILY
Status: DISCONTINUED | OUTPATIENT
Start: 2019-06-13 | End: 2019-06-19 | Stop reason: HOSPADM

## 2019-06-13 RX ORDER — ACETAMINOPHEN 325 MG/1
650 TABLET ORAL
Status: DISCONTINUED | OUTPATIENT
Start: 2019-06-13 | End: 2019-06-19 | Stop reason: HOSPADM

## 2019-06-13 RX ORDER — SODIUM CHLORIDE 9 MG/ML
75 INJECTION, SOLUTION INTRAVENOUS CONTINUOUS
Status: DISCONTINUED | OUTPATIENT
Start: 2019-06-13 | End: 2019-06-14

## 2019-06-13 RX ORDER — DEXTROSE MONOHYDRATE AND SODIUM CHLORIDE 5; .9 G/100ML; G/100ML
75 INJECTION, SOLUTION INTRAVENOUS CONTINUOUS
Status: DISPENSED | OUTPATIENT
Start: 2019-06-13 | End: 2019-06-14

## 2019-06-13 RX ORDER — FLUCONAZOLE 2 MG/ML
400 INJECTION, SOLUTION INTRAVENOUS EVERY 24 HOURS
Status: DISCONTINUED | OUTPATIENT
Start: 2019-06-14 | End: 2019-06-15 | Stop reason: DRUGHIGH

## 2019-06-13 RX ORDER — AMITRIPTYLINE HYDROCHLORIDE 50 MG/1
100 TABLET, FILM COATED ORAL
Status: DISCONTINUED | OUTPATIENT
Start: 2019-06-13 | End: 2019-06-19 | Stop reason: HOSPADM

## 2019-06-13 RX ORDER — ZOLPIDEM TARTRATE 5 MG/1
5 TABLET ORAL
Status: DISCONTINUED | OUTPATIENT
Start: 2019-06-13 | End: 2019-06-14

## 2019-06-13 RX ORDER — CLONAZEPAM 1 MG/1
4 TABLET ORAL
Status: DISCONTINUED | OUTPATIENT
Start: 2019-06-13 | End: 2019-06-19 | Stop reason: HOSPADM

## 2019-06-13 RX ORDER — OLANZAPINE 2.5 MG/1
7.5 TABLET ORAL
Status: DISCONTINUED | OUTPATIENT
Start: 2019-06-13 | End: 2019-06-19 | Stop reason: HOSPADM

## 2019-06-13 RX ORDER — LEVOTHYROXINE SODIUM 125 UG/1
125 TABLET ORAL
Status: DISCONTINUED | OUTPATIENT
Start: 2019-06-14 | End: 2019-06-19 | Stop reason: HOSPADM

## 2019-06-13 RX ORDER — SODIUM CHLORIDE 0.9 % (FLUSH) 0.9 %
5-40 SYRINGE (ML) INJECTION EVERY 8 HOURS
Status: DISCONTINUED | OUTPATIENT
Start: 2019-06-13 | End: 2019-06-19 | Stop reason: HOSPADM

## 2019-06-13 RX ORDER — ONDANSETRON 2 MG/ML
4 INJECTION INTRAMUSCULAR; INTRAVENOUS
Status: DISCONTINUED | OUTPATIENT
Start: 2019-06-13 | End: 2019-06-19 | Stop reason: HOSPADM

## 2019-06-13 RX ADMIN — PIPERACILLIN SODIUM,TAZOBACTAM SODIUM 4.5 G: 4; .5 INJECTION, POWDER, FOR SOLUTION INTRAVENOUS at 23:28

## 2019-06-13 RX ADMIN — OLANZAPINE 7.5 MG: 2.5 TABLET, FILM COATED ORAL at 22:29

## 2019-06-13 RX ADMIN — VANCOMYCIN HYDROCHLORIDE 1000 MG: 1 INJECTION, POWDER, LYOPHILIZED, FOR SOLUTION INTRAVENOUS at 21:41

## 2019-06-13 RX ADMIN — FLUCONAZOLE IN SODIUM CHLORIDE 400 MG: 2 INJECTION, SOLUTION INTRAVENOUS at 23:28

## 2019-06-13 RX ADMIN — SODIUM CHLORIDE 1000 ML: 900 INJECTION, SOLUTION INTRAVENOUS at 16:28

## 2019-06-13 RX ADMIN — CLONAZEPAM 4 MG: 1 TABLET ORAL at 22:29

## 2019-06-13 RX ADMIN — DEXTROSE MONOHYDRATE AND SODIUM CHLORIDE 125 ML/HR: 5; .9 INJECTION, SOLUTION INTRAVENOUS at 20:40

## 2019-06-13 RX ADMIN — AMITRIPTYLINE HYDROCHLORIDE 100 MG: 50 TABLET, FILM COATED ORAL at 22:30

## 2019-06-13 RX ADMIN — ACETAMINOPHEN 1000 MG: 500 TABLET, FILM COATED ORAL at 16:29

## 2019-06-13 RX ADMIN — Medication 5 ML: at 22:42

## 2019-06-13 RX ADMIN — VITAMIN D, TAB 1000IU (100/BT) 2000 UNITS: 25 TAB at 22:29

## 2019-06-13 NOTE — ED NOTES
TRANSFER - OUT REPORT:    Verbal report given to Lionel Babcock on Carine Cons  being transferred to [facility]  for routine progression of care       Report consisted of patients Situation, Background, Assessment and   Recommendations(SBAR). Information from the following report(s) SBAR was reviewed with the receiving nurse. Lines:   Venous Access Device 18 gague PICC 06/13/19 Upper chest (subclavicular area), left (Active)       Peripheral IV 06/13/19 Left Antecubital (Active)   Site Assessment Clean, dry, & intact 6/13/2019  3:55 PM   Phlebitis Assessment 0 6/13/2019  3:55 PM   Infiltration Assessment 0 6/13/2019  3:55 PM   Dressing Status Clean, dry, & intact 6/13/2019  3:55 PM   Dressing Type Transparent 6/13/2019  3:55 PM   Hub Color/Line Status Green 6/13/2019  3:55 PM   Action Taken Blood drawn 6/13/2019  3:55 PM       Peripheral IV 06/13/19 Right Antecubital (Active)   Site Assessment Clean, dry, & intact 6/13/2019  3:56 PM   Phlebitis Assessment 0 6/13/2019  3:56 PM   Infiltration Assessment 0 6/13/2019  3:56 PM   Dressing Status Clean, dry, & intact 6/13/2019  3:56 PM   Dressing Type Transparent 6/13/2019  3:56 PM   Hub Color/Line Status Cynda Jewel 6/13/2019  3:56 PM        Opportunity for questions and clarification was provided.

## 2019-06-13 NOTE — ED TRIAGE NOTES
Pt in via [facility] from [address] at [address] c/o increased weakness and low oxygen level. Pt denies n/v/d. Temp with ems 102.9 pt has ostomy and wound to abdomen pt states draining purulent drainage. Given zosyn and 1000 ml ns.   Pt also has picc line in left side of chest.

## 2019-06-13 NOTE — ED PROVIDER NOTES
Patient sent from SNF for fever. He states he has been feeling weak. Has had shaking chills and feeling hot. Denies cough, abdominal pain, SOB. Has had back ache. Has difficulty voiding. He has been getting TPN through a central tunneled catheter. He has a colostomy that is functioning. 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 He received Zosyn by EMS and had a blood culture drawn.             Past Medical History:   Diagnosis Date    Acute deep vein thrombosis (DVT) of non-extremity vein 5/20/2019    Family history of malignant neoplasm of gastrointestinal tract     mother colon/ovarian cancer 67    Fecal incontinence     LAR syndrome    Former cigarette smoker     History of rectal cancer     Hypothyroid     stable w/med    Infection and inflammatory reaction due to other internal prosthetic devices, implants and grafts, subsequent encounter 2017    abd wound/mesh colostomy    Insomnia     takes meds    Osteoarthritis, hand     Personal history of colonic polyps 9/2013    x 1    Personal history of malignant neoplasm of rectum, rectosigmoid junction, and anus 9/2013    Psychiatric disorder     anxiety       Past Surgical History:   Procedure Laterality Date    COLONOSCOPY N/A 2/12/2018    COLONOSCOPY / BMI=21 performed by Gonzalo Olivares MD at [address] [place] [address]  2/12/2018         ENDOSCOPY, COLON, DIAGNOSTIC  last 2/3/15    [facility]ily--no polyps--3 year recall    HX COLECTOMY  11/2013    [facility]ily--lap LAR with coloproctostomy, mobilization splenic flexure, diverting loop ileostomy    HX COLECTOMY Right 8/2014    for leak    HX COLOSTOMY  5/11/16    HX GI  4/2016    Sacral nerve stimlator lead trial (unsucessful) and removal    HX HERNIA REPAIR  3/2015, 5/2016    HX HERNIA REPAIR      and Colostomy in May    HX OTHER SURGICAL  10/7/2013    Emily---endorectal us    HX OTHER SURGICAL Bilateral     cataracts  2017    HX POLYPECTOMY      HX VASCULAR ACCESS Left     single lumen port/subsequently removed    IR INSERT TUNL CVC W/O PORT OVER 5 YR  2019    IR REPLACE CVC TUNNELED W/O PORT  2019         Family History:   Problem Relation Age of Onset    Cancer Mother         colon and ovarian    Cancer Brother         prostate    Alcohol abuse Brother     Cancer Maternal Grandmother         bone    Alcohol abuse Father     Alcohol abuse Sister     Stroke Paternal Grandfather        Social History     Socioeconomic History    Marital status: SINGLE     Spouse name: Not on file    Number of children: Not on file    Years of education: Not on file    Highest education level: Not on file   Occupational History    Not on file   Social Needs    Financial resource strain: Not on file    Food insecurity:     Worry: Not on file     Inability: Not on file    Transportation needs:     Medical: Not on file     Non-medical: Not on file   Tobacco Use    Smoking status: Former Smoker     Last attempt to quit: 1963     Years since quittin.6    Smokeless tobacco: Never Used   Substance and Sexual Activity    Alcohol use: Not Currently     Alcohol/week: 7.0 oz     Types: 14 Cans of beer per week     Comment: advised stop drinking given issues    Drug use: No    Sexual activity: Never   Lifestyle    Physical activity:     Days per week: Not on file     Minutes per session: Not on file    Stress: Not on file   Relationships    Social connections:     Talks on phone: Not on file     Gets together: Not on file     Attends Zoroastrianism service: Not on file     Active member of club or organization: Not on file     Attends meetings of clubs or organizations: Not on file     Relationship status: Not on file    Intimate partner violence:     Fear of current or ex partner: Not on file     Emotionally abused: Not on file     Physically abused: Not on file     Forced sexual activity: Not on file   Other Topics Concern  Not on file   Social History Narrative    Not on file         ALLERGIES: Patient has no known allergies. Review of Systems   Constitutional: Positive for chills, fatigue and fever. HENT: Negative. Eyes: Negative. Respiratory: Negative. Cardiovascular: Negative. Gastrointestinal: Negative for abdominal pain, nausea and vomiting. Colostomy   Genitourinary: Positive for difficulty urinating. Negative for scrotal swelling and testicular pain. Musculoskeletal: Positive for back pain. Skin: Negative. Neurological: Negative. Hematological: Negative. Vitals:    06/13/19 1820 06/13/19 1823 06/13/19 1840 06/13/19 1900   BP: 96/53  96/55 90/53   Pulse: (!) 104  (!) 102 99   Resp: 26  26 17   Temp:       SpO2: 96% 97% 97% 90%   Weight:       Height:                Physical Exam   Constitutional: He is oriented to person, place, and time. Thin, ill appearance   HENT:   MM dry   Eyes: Pupils are equal, round, and reactive to light. Neck: Normal range of motion. Neck supple. Cardiovascular: Regular rhythm and normal heart sounds. tachycardia   Pulmonary/Chest: Effort normal and breath sounds normal.   Abdominal: Soft. Bowel sounds are normal. There is no tenderness. There is no guarding. Colostomy with fluid in bag. Abdominal incision with a central ulceration. Not tender. Genitourinary: Penis normal.   Musculoskeletal: He exhibits no edema. Neurological: He is alert and oriented to person, place, and time. Skin: Skin is warm and dry. Nursing note and vitals reviewed. MDM  Number of Diagnoses or Management Options  Diagnosis management comments: Fever  Central catheter - was partially out on May 30 and was advanced back in by IR. UTI - had diaper that was saturated and then could only dribble spontaneously. In and out catheter 1000 cc dark urine.    Labs reviewed  Zosyn given by EMS  2 liters NS IVF  Blood and urine culture  Consult hospitalist - Dr. Meir Richards - will see       Amount and/or Complexity of Data Reviewed  Clinical lab tests: ordered and reviewed  Tests in the radiology section of CPT®: reviewed  Review and summarize past medical records: yes  Discuss the patient with other providers: yes  Independent visualization of images, tracings, or specimens: yes    Critical Care  Total time providing critical care: 30-74 minutes (Critical care time 35 minutes)         Procedures

## 2019-06-14 ENCOUNTER — PATIENT OUTREACH (OUTPATIENT)
Dept: CASE MANAGEMENT | Age: 74
End: 2019-06-14

## 2019-06-14 PROBLEM — E03.9 HYPOTHYROID: Status: ACTIVE | Noted: 2019-06-14

## 2019-06-14 LAB
ALBUMIN SERPL-MCNC: 2 G/DL (ref 3.2–4.6)
ALBUMIN/GLOB SERPL: 0.6 {RATIO} (ref 1.2–3.5)
ALP SERPL-CCNC: 441 U/L (ref 50–136)
ALT SERPL-CCNC: 134 U/L (ref 12–65)
ANION GAP SERPL CALC-SCNC: 7 MMOL/L (ref 7–16)
AST SERPL-CCNC: 80 U/L (ref 15–37)
BASOPHILS # BLD: 0 K/UL (ref 0–0.2)
BASOPHILS NFR BLD: 0 % (ref 0–2)
BILIRUB DIRECT SERPL-MCNC: 2.1 MG/DL
BILIRUB SERPL-MCNC: 2.5 MG/DL (ref 0.2–1.1)
BUN SERPL-MCNC: 17 MG/DL (ref 8–23)
CALCIUM SERPL-MCNC: 7.9 MG/DL (ref 8.3–10.4)
CHLORIDE SERPL-SCNC: 108 MMOL/L (ref 98–107)
CO2 SERPL-SCNC: 21 MMOL/L (ref 21–32)
CREAT SERPL-MCNC: 0.83 MG/DL (ref 0.8–1.5)
CRP SERPL-MCNC: 12.4 MG/DL (ref 0–0.9)
DIFFERENTIAL METHOD BLD: ABNORMAL
EOSINOPHIL # BLD: 0.1 K/UL (ref 0–0.8)
EOSINOPHIL NFR BLD: 1 % (ref 0.5–7.8)
ERYTHROCYTE [DISTWIDTH] IN BLOOD BY AUTOMATED COUNT: 16.4 % (ref 11.9–14.6)
GLOBULIN SER CALC-MCNC: 3.4 G/DL (ref 2.3–3.5)
GLUCOSE SERPL-MCNC: 119 MG/DL (ref 65–100)
HCT VFR BLD AUTO: 26.5 % (ref 41.1–50.3)
HGB BLD-MCNC: 8.2 G/DL (ref 13.6–17.2)
IMM GRANULOCYTES # BLD AUTO: 0 K/UL (ref 0–0.5)
IMM GRANULOCYTES NFR BLD AUTO: 0 % (ref 0–5)
LYMPHOCYTES # BLD: 0.4 K/UL (ref 0.5–4.6)
LYMPHOCYTES NFR BLD: 7 % (ref 13–44)
MAGNESIUM SERPL-MCNC: 1.8 MG/DL (ref 1.8–2.4)
MCH RBC QN AUTO: 26.4 PG (ref 26.1–32.9)
MCHC RBC AUTO-ENTMCNC: 30.9 G/DL (ref 31.4–35)
MCV RBC AUTO: 85.2 FL (ref 79.6–97.8)
MONOCYTES # BLD: 0.6 K/UL (ref 0.1–1.3)
MONOCYTES NFR BLD: 11 % (ref 4–12)
NEUTS SEG # BLD: 4.7 K/UL (ref 1.7–8.2)
NEUTS SEG NFR BLD: 81 % (ref 43–78)
NRBC # BLD: 0 K/UL (ref 0–0.2)
PHOSPHATE SERPL-MCNC: 3.2 MG/DL (ref 2.3–3.7)
PLATELET # BLD AUTO: 118 K/UL (ref 150–450)
PMV BLD AUTO: 12.2 FL (ref 9.4–12.3)
POTASSIUM SERPL-SCNC: 3.4 MMOL/L (ref 3.5–5.1)
PROT SERPL-MCNC: 5.4 G/DL (ref 6.3–8.2)
RBC # BLD AUTO: 3.11 M/UL (ref 4.23–5.6)
SODIUM SERPL-SCNC: 136 MMOL/L (ref 136–145)
WBC # BLD AUTO: 5.8 K/UL (ref 4.3–11.1)

## 2019-06-14 PROCEDURE — 85025 COMPLETE CBC W/AUTO DIFF WBC: CPT

## 2019-06-14 PROCEDURE — 74011250637 HC RX REV CODE- 250/637: Performed by: FAMILY MEDICINE

## 2019-06-14 PROCEDURE — 74011250636 HC RX REV CODE- 250/636: Performed by: HOSPITALIST

## 2019-06-14 PROCEDURE — 36415 COLL VENOUS BLD VENIPUNCTURE: CPT

## 2019-06-14 PROCEDURE — 74011000258 HC RX REV CODE- 258: Performed by: HOSPITALIST

## 2019-06-14 PROCEDURE — 51798 US URINE CAPACITY MEASURE: CPT

## 2019-06-14 PROCEDURE — BT40ZZZ ULTRASONOGRAPHY OF BLADDER: ICD-10-PCS | Performed by: HOSPITALIST

## 2019-06-14 PROCEDURE — 84100 ASSAY OF PHOSPHORUS: CPT

## 2019-06-14 PROCEDURE — 74011000250 HC RX REV CODE- 250: Performed by: HOSPITALIST

## 2019-06-14 PROCEDURE — 74011250637 HC RX REV CODE- 250/637: Performed by: HOSPITALIST

## 2019-06-14 PROCEDURE — 80076 HEPATIC FUNCTION PANEL: CPT

## 2019-06-14 PROCEDURE — 80048 BASIC METABOLIC PNL TOTAL CA: CPT

## 2019-06-14 PROCEDURE — 65270000029 HC RM PRIVATE

## 2019-06-14 PROCEDURE — 77030019605

## 2019-06-14 PROCEDURE — 77030011943

## 2019-06-14 PROCEDURE — 86140 C-REACTIVE PROTEIN: CPT

## 2019-06-14 PROCEDURE — 3E0436Z INTRODUCTION OF NUTRITIONAL SUBSTANCE INTO CENTRAL VEIN, PERCUTANEOUS APPROACH: ICD-10-PCS | Performed by: HOSPITALIST

## 2019-06-14 PROCEDURE — 83735 ASSAY OF MAGNESIUM: CPT

## 2019-06-14 PROCEDURE — 77030020245 HC SOL INJ 5% D/0.9%NACL

## 2019-06-14 RX ORDER — DEXTROSE MONOHYDRATE AND SODIUM CHLORIDE 5; .9 G/100ML; G/100ML
500 INJECTION, SOLUTION INTRAVENOUS ONCE
Status: COMPLETED | OUTPATIENT
Start: 2019-06-14 | End: 2019-06-14

## 2019-06-14 RX ORDER — POTASSIUM CHLORIDE 29.8 MG/ML
40 INJECTION INTRAVENOUS ONCE
Status: COMPLETED | OUTPATIENT
Start: 2019-06-14 | End: 2019-06-15

## 2019-06-14 RX ORDER — TEMAZEPAM 15 MG/1
15 CAPSULE ORAL
Status: DISCONTINUED | OUTPATIENT
Start: 2019-06-14 | End: 2019-06-19 | Stop reason: HOSPADM

## 2019-06-14 RX ADMIN — LEVOTHYROXINE SODIUM 125 MCG: 125 TABLET ORAL at 05:45

## 2019-06-14 RX ADMIN — TEMAZEPAM 15 MG: 15 CAPSULE ORAL at 22:28

## 2019-06-14 RX ADMIN — VITAMIN D, TAB 1000IU (100/BT) 2000 UNITS: 25 TAB at 08:12

## 2019-06-14 RX ADMIN — AMITRIPTYLINE HYDROCHLORIDE 100 MG: 50 TABLET, FILM COATED ORAL at 21:05

## 2019-06-14 RX ADMIN — TAMSULOSIN HYDROCHLORIDE 0.4 MG: 0.4 CAPSULE ORAL at 08:11

## 2019-06-14 RX ADMIN — Medication 5 ML: at 05:41

## 2019-06-14 RX ADMIN — DEXTROSE MONOHYDRATE AND SODIUM CHLORIDE 500 ML/HR: 5; .9 INJECTION, SOLUTION INTRAVENOUS at 02:17

## 2019-06-14 RX ADMIN — OLANZAPINE 7.5 MG: 2.5 TABLET, FILM COATED ORAL at 22:28

## 2019-06-14 RX ADMIN — PIPERACILLIN SODIUM,TAZOBACTAM SODIUM 4.5 G: 4; .5 INJECTION, POWDER, FOR SOLUTION INTRAVENOUS at 23:58

## 2019-06-14 RX ADMIN — RIVAROXABAN 20 MG: 20 TABLET, FILM COATED ORAL at 08:11

## 2019-06-14 RX ADMIN — PIPERACILLIN SODIUM,TAZOBACTAM SODIUM 4.5 G: 4; .5 INJECTION, POWDER, FOR SOLUTION INTRAVENOUS at 17:57

## 2019-06-14 RX ADMIN — PIPERACILLIN SODIUM,TAZOBACTAM SODIUM 4.5 G: 4; .5 INJECTION, POWDER, FOR SOLUTION INTRAVENOUS at 08:12

## 2019-06-14 RX ADMIN — POTASSIUM CHLORIDE: 2 INJECTION, SOLUTION, CONCENTRATE INTRAVENOUS at 17:49

## 2019-06-14 RX ADMIN — POTASSIUM CHLORIDE 40 MEQ: 400 INJECTION, SOLUTION INTRAVENOUS at 13:39

## 2019-06-14 RX ADMIN — SOYBEAN OIL 250 ML: 20 INJECTION, SOLUTION INTRAVENOUS at 17:50

## 2019-06-14 RX ADMIN — VITAMIN D, TAB 1000IU (100/BT) 2000 UNITS: 25 TAB at 21:05

## 2019-06-14 RX ADMIN — CLONAZEPAM 4 MG: 1 TABLET ORAL at 21:05

## 2019-06-14 RX ADMIN — Medication 10 ML: at 21:06

## 2019-06-14 NOTE — PROGRESS NOTES
Nutrition:  Nutrition Consult for General Nutrition Management:   TPN Management Candido Patel MD)  Assessment:  Food/Nutrition Patient History:   Pt well known to nutrition, last admission secondary dislodged R IJ CVC. Currently admitted with acute UTI. 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 Past medical history notable for rectal cancer > 5 years, chronic brito, recurrent SBO, abscesses, ileostomy, colostomy in 2014. Pt reports he has remained on continuous TPN at [facility] and his diet was advanced to \"liquids and can be frozen. \" He indicates he was taking small portions of liquids but is unable to provide a quantifiable history. This am prior to RD visit he attempted to self feed mechanical soft diet and wasted a majority of the meal on himself. Consumed portions of grits and eggs. Called facility, [facility], RN reports pt was on full liquids with TPN including lipids @ 50ml/hr - unable to identify composition of TPN other than lipids regimen (250ml over 12 hours). No answer at extension provided for pt's nurse Lopez. Lab Results   Component Value Date/Time    Sodium 136 06/14/2019 04:06 AM    Potassium 3.4 (L) 06/14/2019 04:06 AM    Chloride 108 (H) 06/14/2019 04:06 AM    CO2 21 06/14/2019 04:06 AM    Anion gap 7 06/14/2019 04:06 AM    Glucose 119 (H) 06/14/2019 04:06 AM    BUN 17 06/14/2019 04:06 AM    Creatinine 0.83 06/14/2019 04:06 AM    Calcium 7.9 (L) 06/14/2019 04:06 AM    Albumin 2.0 (L) 06/14/2019 04:06 AM    Phosphorus 3.2 06/14/2019 04:06 AM    Mg 1.8, CRP 12.4. Pertinent Medications: D5 NS @ 75ml/hr was 125ml/hr overnight, Vit D3, Zofran, Zosyn. TPN active on MAR was not prepared secondary ordered after cutoff time for Rx 6/13. IV access: Tunneled CVC placed 5/20/19  Anthropometrics:Height: 5' 8.5\" (174 cm),  Weight: 56.7 kg (125 lb), Weight Source: Standing scale (comment), Body mass index is 18.73 kg/m². BMI class of underweight for age >71.   Weight history per EMR: Unable to determine if weights are actual versus stated due to functionality of [facility]. Of note 6/4 value is the same as patient stated weight for this admission. Obtained standing weight at RD visit with Kenneth Haider RN working as PCT for [employer] validation. WT / BMI WEIGHT   6/14/2019 125 lb   6/4/2019 116 lb   5/30/2019 116 lb   5/21/2019 128 lb 1.6 oz   5/3/2019 130 lb 8.2 oz   3/24/2019 137 lb   3/20/2019 140 lb   12/14/2018 142 lb   Available data reveals 12% wt loss over 6 months. Meets Criteria for Malnutrition in the context of Chronic Illness   [] Severe Malnutrition, as evidenced by:   [] Severe loss of muscle mass   [] Nutritional intake of <75% of energy intake compared to estimated energy needs for > 1 month   [x] Weight loss of >5% in 1 month, >7.5% in 3 months,  >10% in 6 months, or >20% in 12 months   [] Severe edema   [] Severe loss of subcutaneous fat   [] Functional decline   [] Non-Severe (Moderate) Malnutrition, as evidenced by:   [x] Mild loss of muscle mass   [] Nutritional intake of <75% of energy intake compared to estimated energy needs for > 1 month   [] Weight loss of  5% in 1 month, 7.5% in 3 months, or 10% in 6 months,>20% in 12 months   [] Mild edema   [x] Mild loss of subcutaneous fat        Macronutrient Needs:  57 kg standing body wt 6/14/19  EER:  6757-3542 kcal /day (30-35 kcal/kg)  EPR:  68-86 grams protein/day (1.2-1.5 grams/kg) (GFR >60ml/min)  CHO limit per day: 328 grams CHO/day (4mg/kg/min/day)  Fluid/day: ~1.7-2 liters/day (1ml/kcal/day)  Diet: TPN ADULT - CENTRAL  DIET MECHANICAL SOFT    Intake/Comparative Standards:   Pt chronically unable to meet needs on full liquids. Current IVF contributing 306 non-protein calories. (~18% EEN)     Nutrition Diagnosis:  Altered GI function related to EC fistula as evidenced by use of long term TPN. Intervention:  Meals and snacks: Change to Full Liquids per diet at [facility]. Pt declines ONS.    Parenteral Nutrition/IV fluid: 1.  Start TPN: 15%DEX/ 5%AA at 74 ml/hr with 250 ml 20% Lipids daily to provide 1693 kcal/d (99% of needs), 84 grams of protein/d (100% of needs), 252 grams of CHO/d (does not exceed maximum CHO load) and ~2026 ml of total volume/d ( 100% of needs). 2.  TPN additives: NaCl 40 mEq/L, 30 mEq/L KCl, 20 mEq/L KPhos, 4.5 mEq/L  CaGluconate, 8 mEq/L MgSulfate, MVI/TE. 3.  Discontinue IVF with initiation of TPN. Vitamin and Mineral Supplement Therapy:  1. Nutrition support orders for electrolyte management replacement are activated and placed on the MAR. 2.  KCl replacement today. Add TPN labs, POC Glucoses/SSI if Glucose > 180 mg/dl on AM BMP. Coordination of Nutrition Care: Astrid RN, Suad Monique, Pharmacist.   Discharge Nutrition Needs: Anticipate patient will continue to require TPN on discharge.      [place], [place], [address]

## 2019-06-14 NOTE — PROGRESS NOTES
06/13/19 2005   Dual Skin Pressure Injury Assessment   Dual Skin Pressure Injury Assessment WDL   Second Care Provider (Based on Facility Policy) Yvonne   Skin Integumentary   Skin Integumentary (WDL) X   Skin Color Appropriate for ethnicity   Skin Condition/Temp Warm;Dry   Skin Integrity Wound (add Wound LDA); Other (comment)  (colostomy at [facility], wound at the umbilical area)

## 2019-06-14 NOTE — PROGRESS NOTES
Initial visit by  to convey care and concern and encourage patient that  services are available if desired. Mr. Pillo Garza was receiving care from staff. Provided 's business card at door for future reference. Chaplains remain available for support.      Yogesh Crawford 66  Board Certified

## 2019-06-14 NOTE — PROGRESS NOTES
Hospitalist     Admit Date:  2019  3:47 PM   Name:  Nely Stephens   Age:  74 y.o.  :  1945   MRN:  [id number]   PCP:  DO andrew Yung   Patient is a 75 y/o male with hx rectal cancer, s/p resection, colostomy, SBO, hypothyroidism who presents to ED from SNF with fever and chills. Tmax in  with tachycardia. Lactic acid normal at 1.23. Patient could not provide urine speciment but was cathed and had 1 liter retained urine. UA shows small leukocyte esterase and 4+ bacteria. He had received 1 dose of IV zosyn by EMS. No N/V or abdominal or flank pain. He receives supplemental nutrition with TPN and lipids. Will admit for further treatment.      Today, vitals normalizing, feels better, good appetite    Objective:     Patient Vitals for the past 24 hrs:   Temp Pulse Resp BP SpO2   19 1109 97.5 °F (36.4 °C) 97 16 130/77 93 %   19 0750 98.8 °F (37.1 °C) 92 16 121/72 90 %   19 0428 98 °F (36.7 °C) 85 17 112/61    19 0248    94/52    19 0037    90/47    19 2330 98.5 °F (36.9 °C) 85 16 (!) 84/55 91 %   19 99.1 °F (37.3 °C) 93 18 101/55 95 %   19 1940    93/51 96 %   19 1920    92/52 96 %   19 1910 99.4 °F (37.4 °C)       19 1900  99 17 90/53 90 %   19 1840  (!) 102 26 96/55 97 %   19 1823     97 %   19 1820  (!) 104 26 96/53 96 %   19 1800  (!) 110 18 101/55 97 %   19 1740  (!) 112 28 128/58 98 %   19 1720  (!) 110 29 134/68 97 %   19 1700  (!) 111 18 142/74 97 %   19 1640  (!) 112 22 142/70 97 %   19 1620  (!) 125 25 136/72 96 %   19 1600  (!) 118 29 133/72 99 %   19 1553 (!) 103 °F (39.4 °C) (!) 121 18 141/77 97 %     Oxygen Therapy  O2 Sat (%): 93 % (19 1109)  Pulse via Oximetry: 99 beats per minute (19 1940)  O2 Device: Room air (19 1823)    Intake/Output Summary (Last 24 hours) at 2019 1726 [facility] filed at 6/14/2019 0325  Gross per 24 hour   Intake    Output 2100 ml   Net -2100 ml       Physical Exam:  General:    Thin, cachectic, hoarse voice. Alert. CV:    RRR  No murmur, rub, or gallop. Lungs:  CTAB. No wheezing, rhonchi, or rales. Abdomen: Soft, nontender, nondistended. Bowel sounds normal.   Colostomy bag in place, brown liquid stool  Extremities: Warm and dry. No cyanosis or edema. Neurologic:  grossly intact. Skin:     No rashes or jaundice. No wounds. Psych:  Normal mood and affect. I reviewed the labs, imaging, EKGs, telemetry, and other studies done this admission. Data Review:   Recent Results (from the past 24 hour(s))   EKG, 12 LEAD, INITIAL    Collection Time: 06/13/19  3:57 PM   Result Value Ref Range    Ventricular Rate 117 BPM    Atrial Rate 117 BPM    P-R Interval 170 ms    QRS Duration 92 ms    Q-T Interval 290 ms    QTC Calculation (Bezet) 404 ms    Calculated P Axis 64 degrees    Calculated R Axis 70 degrees    Calculated T Axis 36 degrees    Diagnosis       !! AGE AND GENDER SPECIFIC ECG ANALYSIS !! Sinus tachycardia  Nonspecific ST abnormality  When compared with ECG of 19-NOV-2015 17:48,  Vent.  rate has increased BY  52 BPM  ST now depressed in Anterior leads  Nonspecific T wave abnormality no longer evident in Anterolateral leads  Confirmed by [facility]  MD (), MAX RAYO ([id number]) on 6/13/2019 5:07:12 PM     CBC WITH AUTOMATED DIFF    Collection Time: 06/13/19  4:00 PM   Result Value Ref Range    WBC 6.8 4.3 - 11.1 K/uL    RBC 3.40 (L) 4.23 - 5.6 M/uL    HGB 9.0 (L) 13.6 - 17.2 g/dL    HCT 28.0 (L) 41.1 - 50.3 %    MCV 82.4 79.6 - 97.8 FL    MCH 26.5 26.1 - 32.9 PG    MCHC 32.1 31.4 - 35.0 g/dL    RDW 15.9 (H) 11.9 - 14.6 %    PLATELET 275 (L) 479 - 450 K/uL    MPV 12.5 (H) 9.4 - 12.3 FL    ABSOLUTE NRBC 0.00 0.0 - 0.2 K/uL    DF AUTOMATED      NEUTROPHILS 89 (H) 43 - 78 %    LYMPHOCYTES 4 (L) 13 - 44 %    MONOCYTES 7 4.0 - 12.0 %    EOSINOPHILS 0 (L) 0.5 - 7.8 %    BASOPHILS 0 0.0 - 2.0 %    IMMATURE GRANULOCYTES 0 0.0 - 5.0 %    ABS. NEUTROPHILS 6.0 1.7 - 8.2 K/UL    ABS. LYMPHOCYTES 0.3 (L) 0.5 - 4.6 K/UL    ABS. MONOCYTES 0.4 0.1 - 1.3 K/UL    ABS. EOSINOPHILS 0.0 0.0 - 0.8 K/UL    ABS. BASOPHILS 0.0 0.0 - 0.2 K/UL    ABS. IMM. GRANS. 0.0 0.0 - 0.5 K/UL   METABOLIC PANEL, COMPREHENSIVE    Collection Time: 06/13/19  4:00 PM   Result Value Ref Range    Sodium 133 (L) 136 - 145 mmol/L    Potassium 3.7 3.5 - 5.1 mmol/L    Chloride 100 98 - 107 mmol/L    CO2 23 21 - 32 mmol/L    Anion gap 10 7 - 16 mmol/L    Glucose 92 65 - 100 mg/dL    BUN 23 8 - 23 MG/DL    Creatinine 0.78 (L) 0.8 - 1.5 MG/DL    GFR est AA >60 >60 ml/min/1.73m2    GFR est non-AA >60 >60 ml/min/1.73m2    Calcium 8.3 8.3 - 10.4 MG/DL    Bilirubin, total 3.2 (H) 0.2 - 1.1 MG/DL    ALT (SGPT) 179 (H) 12 - 65 U/L    AST (SGOT) 115 (H) 15 - 37 U/L    Alk.  phosphatase 577 (H) 50 - 136 U/L    Protein, total 6.5 6.3 - 8.2 g/dL    Albumin 2.4 (L) 3.2 - 4.6 g/dL    Globulin 4.1 (H) 2.3 - 3.5 g/dL    A-G Ratio 0.6 (L) 1.2 - 3.5     CULTURE, BLOOD    Collection Time: 06/13/19  4:00 PM   Result Value Ref Range    Special Requests: LEFT  Antecubital        Culture result: NO GROWTH AFTER 15 HOURS     CULTURE, BLOOD    Collection Time: 06/13/19  4:00 PM   Result Value Ref Range    Special Requests: RIGHT  FOREARM        Culture result: NO GROWTH AFTER 15 HOURS     POC LACTIC ACID    Collection Time: 06/13/19  4:07 PM   Result Value Ref Range    Lactic Acid (POC) 1.23 0.5 - 1.9 mmol/L   CULTURE, BLOOD    Collection Time: 06/13/19  4:26 PM   Result Value Ref Range    Special Requests: CENTRAL      GRAM STAIN YEAST      GRAM STAIN AEROBIC BOTTLE POSITIVE      GRAM STAIN        RESULTS VERIFIED, PHONED TO AND READ BACK BY Brittany Grant TO Jojo Moore 95 ON [email or web address]    Culture result: CULTURE IN PROGRESS,FURTHER UPDATES TO FOLLOW     URINALYSIS W/ RFLX MICROSCOPIC    Collection Time: 06/13/19  6:28 PM   Result Value Ref Range    Color ORANGE      Appearance CLOUDY      Specific gravity 1.017 1.001 - 1.023      pH (UA) 5.5 5.0 - 9.0      Protein 100 (A) NEG mg/dL    Glucose NEGATIVE  mg/dL    Ketone TRACE (A) NEG mg/dL    Bilirubin MODERATE (A) NEG      Blood LARGE (A) NEG      Urobilinogen 1.0 0.2 - 1.0 EU/dL    Nitrites NEGATIVE  NEG      Leukocyte Esterase SMALL (A) NEG      WBC 0-3 0 /hpf    RBC 10-20 0 /hpf    Epithelial cells 0-3 0 /hpf    Bacteria 4+ (H) 0 /hpf   CULTURE, URINE    Collection Time: 06/13/19  6:28 PM   Result Value Ref Range    Special Requests: NO SPECIAL REQUESTS      Culture result:        NO GROWTH AFTER SHORT PERIOD OF INCUBATION. FURTHER RESULTS TO FOLLOW AFTER OVERNIGHT INCUBATION. METABOLIC PANEL, BASIC    Collection Time: 06/14/19  4:06 AM   Result Value Ref Range    Sodium 136 136 - 145 mmol/L    Potassium 3.4 (L) 3.5 - 5.1 mmol/L    Chloride 108 (H) 98 - 107 mmol/L    CO2 21 21 - 32 mmol/L    Anion gap 7 7 - 16 mmol/L    Glucose 119 (H) 65 - 100 mg/dL    BUN 17 8 - 23 MG/DL    Creatinine 0.83 0.8 - 1.5 MG/DL    GFR est AA >60 >60 ml/min/1.73m2    GFR est non-AA >60 >60 ml/min/1.73m2    Calcium 7.9 (L) 8.3 - 10.4 MG/DL   HEPATIC FUNCTION PANEL    Collection Time: 06/14/19  4:06 AM   Result Value Ref Range    Protein, total 5.4 (L) 6.3 - 8.2 g/dL    Albumin 2.0 (L) 3.2 - 4.6 g/dL    Globulin 3.4 2.3 - 3.5 g/dL    A-G Ratio 0.6 (L) 1.2 - 3.5      Bilirubin, total 2.5 (H) 0.2 - 1.1 MG/DL    Bilirubin, direct 2.1 (H) <0.4 MG/DL    Alk.  phosphatase 441 (H) 50 - 136 U/L    AST (SGOT) 80 (H) 15 - 37 U/L    ALT (SGPT) 134 (H) 12 - 65 U/L   MAGNESIUM    Collection Time: 06/14/19  4:06 AM   Result Value Ref Range    Magnesium 1.8 1.8 - 2.4 mg/dL   C REACTIVE PROTEIN, QT    Collection Time: 06/14/19  4:06 AM   Result Value Ref Range    C-Reactive protein 12.4 (H) 0.0 - 0.9 mg/dL   CBC WITH AUTOMATED DIFF    Collection Time: 06/14/19  4:06 AM   Result Value Ref Range    WBC 5.8 4.3 - 11.1 K/uL    RBC 3.11 (L) 4.23 - 5.6 M/uL    HGB 8.2 (L) 13.6 - 17.2 g/dL    HCT 26.5 (L) 41.1 - 50.3 %    MCV 85.2 79.6 - 97.8 FL    MCH 26.4 26.1 - 32.9 PG    MCHC 30.9 (L) 31.4 - 35.0 g/dL    RDW 16.4 (H) 11.9 - 14.6 %    PLATELET 095 (L) 211 - 450 K/uL    MPV 12.2 9.4 - 12.3 FL    ABSOLUTE NRBC 0.00 0.0 - 0.2 K/uL    DF AUTOMATED      NEUTROPHILS 81 (H) 43 - 78 %    LYMPHOCYTES 7 (L) 13 - 44 %    MONOCYTES 11 4.0 - 12.0 %    EOSINOPHILS 1 0.5 - 7.8 %    BASOPHILS 0 0.0 - 2.0 %    IMMATURE GRANULOCYTES 0 0.0 - 5.0 %    ABS. NEUTROPHILS 4.7 1.7 - 8.2 K/UL    ABS. LYMPHOCYTES 0.4 (L) 0.5 - 4.6 K/UL    ABS. MONOCYTES 0.6 0.1 - 1.3 K/UL    ABS. EOSINOPHILS 0.1 0.0 - 0.8 K/UL    ABS. BASOPHILS 0.0 0.0 - 0.2 K/UL    ABS. IMM.  GRANS. 0.0 0.0 - 0.5 K/UL   PHOSPHORUS    Collection Time: 06/14/19  4:06 AM   Result Value Ref Range    Phosphorus 3.2 2.3 - 3.7 MG/DL       Imaging Studies:  CXR Results  (Last 48 hours)    None        CT Results  (Last 48 hours)    None          Assessment and Plan:     Hospital Problems as of 6/14/2019 Date Reviewed: 12/17/2018          Codes Class Noted - Resolved POA    Hypothyroid ICD-10-CM: E03.9  ICD-9-CM: 244.9  6/14/2019 - Present Yes    Overview Signed 6/14/2019 12:12 AM by Jodie Myles MD     stable w/med             Fever ICD-10-CM: R50.9  ICD-9-CM: 780.60  6/13/2019 - Present Yes        * (Principal) Acute UTI (urinary tract infection) ICD-10-CM: N39.0  ICD-9-CM: 599.0  6/13/2019 - Present Yes        Anemia ICD-10-CM: D64.9  ICD-9-CM: 285.9  6/13/2019 - Present Yes        Elevated LFTs ICD-10-CM: R94.5  ICD-9-CM: 790.6  6/13/2019 - Present Yes        Malnutrition ([facility] ICD-10-CM: E46  ICD-9-CM: 263.9  5/19/2019 - Present Yes        On total parenteral nutrition (TPN) ICD-10-CM: Z78.9  ICD-9-CM: V49.89  5/19/2019 - Present Yes        Colostomy care ([facility] ICD-10-CM: Z43.3  ICD-9-CM: V55.3  5/19/2019 - Present Yes              PLAN:    Continue Zosyn  Follow cultures  TPN  K replacement    Dispo: home    Signed:  Stan Bragg MD

## 2019-06-14 NOTE — PROGRESS NOTES
Pt resting in bed and is alert and oriented x 4. She denies pain and is on room air. RR even and unlabored. Colostomy bag in place. IVF infusing. Call light in reach and pt instructed to call for assistance if needed. Will monitor.

## 2019-06-14 NOTE — PROGRESS NOTES
Problem: Falls - Risk of  Goal: *Absence of Falls  Description  Document Nuno Sherman Fall Risk and appropriate interventions in the flowsheet. Outcome: Progressing Towards Goal     Problem: Pressure Injury - Risk of  Goal: *Prevention of pressure injury  Description  Document Josef Scale and appropriate interventions in the flowsheet.   Outcome: Progressing Towards Goal

## 2019-06-14 NOTE — H&P
Hospitalist H&P/Consult Note     Admit Date:  2019  3:47 PM   Name:  Ismael Barillas   Age:  74 y.o.  :  1945   MRN:  [id number]   PCP:  Liyah Saab DO  Treatment Team: Attending Provider: Sofía Hooper MD    HPI:   Patient is a 75 y/o male with hx rectal cancer, s/p resection, colostomy, SBO, hypothyroidism who presents to ED from SNF with fever and chills. Tmax in  with tachycardia. Lactic acid normal at 1.23. Patient could not provide urine speciment but was cathed and had 1 liter retained urine. UA shows small leukocyte esterase and 4+ bacteria. He had received 1 dose of IV zosyn by EMS. No N/V or abdominal or flank pain. He receives supplemental nutrition with TPN and lipids. Will admit for further treatment. 10 systems reviewed and negative except as noted in HPI.   Past Medical History:   Diagnosis Date    Acute deep vein thrombosis (DVT) of non-extremity vein 2019    Family history of malignant neoplasm of gastrointestinal tract     mother colon/ovarian cancer 67    Fecal incontinence     LAR syndrome    Former cigarette smoker     History of rectal cancer     Hypothyroid     stable w/med    Infection and inflammatory reaction due to other internal prosthetic devices, implants and grafts, subsequent encounter     abd wound/mesh colostomy    Insomnia     takes meds    Osteoarthritis, hand     Personal history of colonic polyps 9/2013    x 1    Personal history of malignant neoplasm of rectum, rectosigmoid junction, and anus 2013    Psychiatric disorder     anxiety      Past Surgical History:   Procedure Laterality Date    COLONOSCOPY N/A 2018    COLONOSCOPY / BMI=21 performed by Carlos Herrmann MD at [address] [place] [address]  2018         ENDOSCOPY, COLON, DIAGNOSTIC  last 2/3/15    Emily--no polyps--3 year recall    HX COLECTOMY  2013    Emily--lap LAR with coloproctostomy, mobilization splenic flexure, diverting loop ileostomy    HX COLECTOMY Right 2014    for leak    HX COLOSTOMY  16    HX GI  2016    Sacral nerve stimlator lead trial (unsucessful) and removal    HX HERNIA REPAIR  3/2015, 2016    HX HERNIA REPAIR      and Colostomy in May    HX OTHER SURGICAL  10/7/2013    Emily---endorectal us    HX OTHER SURGICAL Bilateral     cataracts      HX POLYPECTOMY      HX VASCULAR ACCESS Left     single lumen port/subsequently removed    IR INSERT TUNL CVC W/O PORT OVER 5 YR  2019    IR REPLACE CVC TUNNELED W/O PORT  2019      Prior to Admission Medications   Prescriptions Last Dose Informant Patient Reported? Taking? Cholecalciferol, Vitamin D3, (VITAMIN D3) 2,000 unit cap capsule   No No   Sig: Take 2,000 Units by mouth two (2) times a day. OLANZapine (ZYPREXA) 2.5 mg tablet   Yes No   Sig: Take 7.5 mg by mouth nightly. Indications: Takes 3 x 2.5 mg tabs   TPN ADULT - CENTRAL   Yes No   Sig: by IntraVENous route continuous. amitriptyline (ELAVIL) 100 mg tablet   Yes No   Sig: Take 100 mg by mouth nightly. Dr Kent Dys psych  Indications: takes for sleep   clonazePAM (KLONOPIN) 2 mg tablet   No No   Sig: Take 2 Tabs by mouth nightly. Max Daily Amount: 4 mg. Indications: takes for sleep   levothyroxine (SYNTHROID) 125 mcg tablet   No No   Sig: TAKE ONE TABLET BY MOUTH ONE TIME DAILY BEFORE BREAKFAST   rivaroxaban (XARELTO) 20 mg tab tablet   No No   Sig: Take 1 Tab by mouth daily (with dinner). tamsulosin (FLOMAX) 0.4 mg capsule   Yes No   Sig: Take 0.4 mg by mouth daily. zolpidem (AMBIEN) 5 mg tablet   Yes No   Sig: Take 5 mg by mouth nightly as needed.       Facility-Administered Medications: None     No Known Allergies   Social History     Tobacco Use    Smoking status: Former Smoker     Last attempt to quit: 1963     Years since quittin.8    Smokeless tobacco: Never Used   Substance Use Topics    Alcohol use: Not Currently     Alcohol/week: 7.0 oz Types: 14 Cans of beer per week     Comment: advised stop drinking given issues      Family History   Problem Relation Age of Onset    Cancer Mother         colon and ovarian    Cancer Brother         prostate    Alcohol abuse Brother     Cancer Maternal Grandmother         bone    Alcohol abuse Father     Alcohol abuse Sister     Stroke Paternal Grandfather       Immunization History   Administered Date(s) Administered    TB Skin Test (PPD) Intradermal 01/28/2014, 08/24/2014, 09/09/2014, 11/19/2015, 05/23/2016, 03/25/2019, 04/23/2019       Objective:     Patient Vitals for the past 24 hrs:   Temp Pulse Resp BP SpO2   06/13/19 2013 99.1 °F (37.3 °C) 93 18 101/55 95 %   06/13/19 1940    93/51 96 %   06/13/19 1920    92/52 96 %   06/13/19 1910 99.4 °F (37.4 °C)       06/13/19 1900  99 17 90/53 90 %   06/13/19 1840  (!) 102 26 96/55 97 %   06/13/19 1823     97 %   06/13/19 1820  (!) 104 26 96/53 96 %   06/13/19 1800  (!) 110 18 101/55 97 %   06/13/19 1740  (!) 112 28 128/58 98 %   06/13/19 1720  (!) 110 29 134/68 97 %   06/13/19 1700  (!) 111 18 142/74 97 %   06/13/19 1640  (!) 112 22 142/70 97 %   06/13/19 1620  (!) 125 25 136/72 96 %   06/13/19 1600  (!) 118 29 133/72 99 %   06/13/19 1553 (!) 103 °F (39.4 °C) (!) 121 18 141/77 97 %     Oxygen Therapy  O2 Sat (%): 95 % (06/13/19 2013)  Pulse via Oximetry: 99 beats per minute (06/13/19 1940)  O2 Device: Room air (06/13/19 1823)    Intake/Output Summary (Last 24 hours) at 6/14/2019 0013  Last data filed at 6/13/2019 1825  Gross per 24 hour   Intake    Output 800 ml   Net -800 ml       Physical Exam:  General:    Thin, cachectic, hoarse voice. Alert. Eyes:   Normal sclera. Extraocular movements intact. ENT:  Normocephalic, atraumatic. Moist mucous membranes  CV:   tachycardic. No murmur, rub, or gallop. Lungs:  CTAB. No wheezing, rhonchi, or rales. Abdomen: Soft, nontender, nondistended.  Bowel sounds normal.   Colostomy bag in place, brown liquid stool  Extremities: Warm and dry. No cyanosis or edema. Neurologic: CN II-XII grossly intact. Sensation intact. Skin:     No rashes or jaundice. No wounds. Psych:  Normal mood and affect. I reviewed the labs, imaging, EKGs, telemetry, and other studies done this admission. Data Review:   Recent Results (from the past 24 hour(s))   EKG, 12 LEAD, INITIAL    Collection Time: 06/13/19  3:57 PM   Result Value Ref Range    Ventricular Rate 117 BPM    Atrial Rate 117 BPM    P-R Interval 170 ms    QRS Duration 92 ms    Q-T Interval 290 ms    QTC Calculation (Bezet) 404 ms    Calculated P Axis 64 degrees    Calculated R Axis 70 degrees    Calculated T Axis 36 degrees    Diagnosis       !! AGE AND GENDER SPECIFIC ECG ANALYSIS !! Sinus tachycardia  Nonspecific ST abnormality  When compared with ECG of 19-NOV-2015 17:48,  Vent. rate has increased BY  52 BPM  ST now depressed in Anterior leads  Nonspecific T wave abnormality no longer evident in Anterolateral leads  Confirmed by [facility]  MD ()MAX ([id number]) on 6/13/2019 5:07:12 PM     CBC WITH AUTOMATED DIFF    Collection Time: 06/13/19  4:00 PM   Result Value Ref Range    WBC 6.8 4.3 - 11.1 K/uL    RBC 3.40 (L) 4.23 - 5.6 M/uL    HGB 9.0 (L) 13.6 - 17.2 g/dL    HCT 28.0 (L) 41.1 - 50.3 %    MCV 82.4 79.6 - 97.8 FL    MCH 26.5 26.1 - 32.9 PG    MCHC 32.1 31.4 - 35.0 g/dL    RDW 15.9 (H) 11.9 - 14.6 %    PLATELET 472 (L) 051 - 450 K/uL    MPV 12.5 (H) 9.4 - 12.3 FL    ABSOLUTE NRBC 0.00 0.0 - 0.2 K/uL    DF AUTOMATED      NEUTROPHILS 89 (H) 43 - 78 %    LYMPHOCYTES 4 (L) 13 - 44 %    MONOCYTES 7 4.0 - 12.0 %    EOSINOPHILS 0 (L) 0.5 - 7.8 %    BASOPHILS 0 0.0 - 2.0 %    IMMATURE GRANULOCYTES 0 0.0 - 5.0 %    ABS. NEUTROPHILS 6.0 1.7 - 8.2 K/UL    ABS. LYMPHOCYTES 0.3 (L) 0.5 - 4.6 K/UL    ABS. MONOCYTES 0.4 0.1 - 1.3 K/UL    ABS. EOSINOPHILS 0.0 0.0 - 0.8 K/UL    ABS. BASOPHILS 0.0 0.0 - 0.2 K/UL    ABS. IMM.  GRANS. 0.0 0.0 - 0.5 K/UL METABOLIC PANEL, COMPREHENSIVE    Collection Time: 06/13/19  4:00 PM   Result Value Ref Range    Sodium 133 (L) 136 - 145 mmol/L    Potassium 3.7 3.5 - 5.1 mmol/L    Chloride 100 98 - 107 mmol/L    CO2 23 21 - 32 mmol/L    Anion gap 10 7 - 16 mmol/L    Glucose 92 65 - 100 mg/dL    BUN 23 8 - 23 MG/DL    Creatinine 0.78 (L) 0.8 - 1.5 MG/DL    GFR est AA >60 >60 ml/min/1.73m2    GFR est non-AA >60 >60 ml/min/1.73m2    Calcium 8.3 8.3 - 10.4 MG/DL    Bilirubin, total 3.2 (H) 0.2 - 1.1 MG/DL    ALT (SGPT) 179 (H) 12 - 65 U/L    AST (SGOT) 115 (H) 15 - 37 U/L    Alk.  phosphatase 577 (H) 50 - 136 U/L    Protein, total 6.5 6.3 - 8.2 g/dL    Albumin 2.4 (L) 3.2 - 4.6 g/dL    Globulin 4.1 (H) 2.3 - 3.5 g/dL    A-G Ratio 0.6 (L) 1.2 - 3.5     POC LACTIC ACID    Collection Time: 06/13/19  4:07 PM   Result Value Ref Range    Lactic Acid (POC) 1.23 0.5 - 1.9 mmol/L   CULTURE, BLOOD    Collection Time: 06/13/19  4:26 PM   Result Value Ref Range    Special Requests: CENTRAL      GRAM STAIN YEAST      GRAM STAIN AEROBIC BOTTLE POSITIVE      GRAM STAIN        RESULTS VERIFIED, PHONED TO AND READ BACK BY Yenifer Farris TO URSULA ON [email or web address]    Culture result: CULTURE IN [address] UPDATES TO FOLLOW     URINALYSIS W/ RFLX MICROSCOPIC    Collection Time: 06/13/19  6:28 PM   Result Value Ref Range    Color ORANGE      Appearance CLOUDY      Specific gravity 1.017 1.001 - 1.023      pH (UA) 5.5 5.0 - 9.0      Protein 100 (A) NEG mg/dL    Glucose NEGATIVE  mg/dL    Ketone TRACE (A) NEG mg/dL    Bilirubin MODERATE (A) NEG      Blood LARGE (A) NEG      Urobilinogen 1.0 0.2 - 1.0 EU/dL    Nitrites NEGATIVE  NEG      Leukocyte Esterase SMALL (A) NEG      WBC 0-3 0 /hpf    RBC 10-20 0 /hpf    Epithelial cells 0-3 0 /hpf    Bacteria 4+ (H) 0 /hpf       Imaging Studies:  CXR Results  (Last 48 hours)    None        CT Results  (Last 48 hours)    None          Assessment and Plan:     Hospital Problems as of 6/14/2019 Date Reviewed: 12/17/2018          Codes Class Noted - Resolved POA    * (Principal) Acute UTI (urinary tract infection) ICD-10-CM: N39.0  ICD-9-CM: 599.0  6/13/2019 - Present Yes        Fever ICD-10-CM: R50.9  ICD-9-CM: 780.60  6/13/2019 - Present Yes        Elevated LFTs ICD-10-CM: R94.5  ICD-9-CM: 790.6  6/13/2019 - Present Yes        Hypothyroid ICD-10-CM: E03.9  ICD-9-CM: 244.9  6/14/2019 - Present Yes    Overview Signed 6/14/2019 12:12 AM by Milagros Mari MD     stable w/med             Anemia ICD-10-CM: D64.9  ICD-9-CM: 285.9  6/13/2019 - Present Yes        Malnutrition ([address] ICD-10-CM: E46  ICD-9-CM: 263.9  5/19/2019 - Present Yes        On total parenteral nutrition (TPN) ICD-10-CM: Z78.9  ICD-9-CM: V49.89  5/19/2019 - Present Yes        Colostomy care [facility]) ICD-10-CM: Z43.3  ICD-9-CM: V55.3  5/19/2019 - Present Yes              PLAN:  · Admit inpatient to medical bed  · Continue broad-spectrum antibiotics  · Follow-up blood and urine cultures  · Resume TPN with additional lipids  · Monitor LFTs. GI consult if worsen  · IV fluids for now. Repeat labs in am  · Colostomy care.    · flomax for urinary retention  · On Xarelto for recent DVT      Estimated LOS: 2 or more midnights     Signed:  Brunilda Cabrera MD

## 2019-06-14 NOTE — PROGRESS NOTES
Patient has [re]admitted to [facility] from [place] [address] where he has been since 5/22/2019 for STR. One ED visit, also while in STR for displacement of central line. Progress at [place] [address]:  [place]; Ambulation @200 Ft with Sup; Standing Tolerance @ 5 minutes; Stair Training @ Sup.; Level Surface with No AD @ 200 ft; 400ft Sup and verbal cues for gait. d/c 6/5 with therapy. Cont. with nursing - TPN. Remains with brito.  Remains on celpodoxine for UTI    Previously identified as High Risk for Readmission  RRAT - 13, admitted for Acute UTI  Patient experienced long LOS while hospitalized in March:  3/30 through 5/3/2019 for SBO    Plan now to follow with IP Case Management  Pako Gold - Michael Martin, LITZY, RN, [place]

## 2019-06-14 NOTE — PROGRESS NOTES
Care Management Interventions  PCP Verified by CM: Yes  Mode of Transport at Discharge: BLS  Transition of Care Consult (CM Consult): SNF  Partner SNF: Yes  Current Support Network: Own Home  Confirm Follow Up Transport: Family  Plan discussed with Pt/Family/Caregiver: Yes  Freedom of Choice Offered: Yes  Discharge Location  Discharge Placement: [address] with pt regarding plans for discharge, pt coming to us from [facility] and plans to return to continue rehab. Facility aware and prepared for TPN.

## 2019-06-14 NOTE — PROGRESS NOTES
TRANSFER - IN REPORT:    Verbal report received from Mahogany Alcantar RN(name) on Loral Mouse  being received from ER(unit) for routine progression of care      Report consisted of patients Situation, Background, Assessment and   Recommendations(SBAR). Information from the following report(s) SBAR was reviewed with the receiving nurse. Opportunity for questions and clarification was provided. Assessment completed upon patients arrival to unit and care assumed.

## 2019-06-15 LAB
ALBUMIN SERPL-MCNC: 1.9 G/DL (ref 3.2–4.6)
ALBUMIN/GLOB SERPL: 0.5 {RATIO} (ref 1.2–3.5)
ALP SERPL-CCNC: 637 U/L (ref 50–136)
ALT SERPL-CCNC: 136 U/L (ref 12–65)
ANION GAP SERPL CALC-SCNC: 8 MMOL/L (ref 7–16)
AST SERPL-CCNC: 84 U/L (ref 15–37)
BILIRUB DIRECT SERPL-MCNC: 2.4 MG/DL
BILIRUB SERPL-MCNC: 2.7 MG/DL (ref 0.2–1.1)
BUN SERPL-MCNC: 15 MG/DL (ref 8–23)
CALCIUM SERPL-MCNC: 8.4 MG/DL (ref 8.3–10.4)
CHLORIDE SERPL-SCNC: 110 MMOL/L (ref 98–107)
CO2 SERPL-SCNC: 21 MMOL/L (ref 21–32)
CREAT SERPL-MCNC: 0.92 MG/DL (ref 0.8–1.5)
GLOBULIN SER CALC-MCNC: 3.6 G/DL (ref 2.3–3.5)
GLUCOSE SERPL-MCNC: 168 MG/DL (ref 65–100)
MAGNESIUM SERPL-MCNC: 1.9 MG/DL (ref 1.8–2.4)
PHOSPHATE SERPL-MCNC: 3 MG/DL (ref 2.3–3.7)
POTASSIUM SERPL-SCNC: 3.2 MMOL/L (ref 3.5–5.1)
PROT SERPL-MCNC: 5.5 G/DL (ref 6.3–8.2)
SODIUM SERPL-SCNC: 139 MMOL/L (ref 136–145)
TRIGL SERPL-MCNC: 177 MG/DL (ref 35–150)

## 2019-06-15 PROCEDURE — 77030019605

## 2019-06-15 PROCEDURE — 74011250637 HC RX REV CODE- 250/637: Performed by: HOSPITALIST

## 2019-06-15 PROCEDURE — 74011000258 HC RX REV CODE- 258: Performed by: HOSPITALIST

## 2019-06-15 PROCEDURE — 80048 BASIC METABOLIC PNL TOTAL CA: CPT

## 2019-06-15 PROCEDURE — 83735 ASSAY OF MAGNESIUM: CPT

## 2019-06-15 PROCEDURE — 74011000258 HC RX REV CODE- 258: Performed by: INTERNAL MEDICINE

## 2019-06-15 PROCEDURE — 80076 HEPATIC FUNCTION PANEL: CPT

## 2019-06-15 PROCEDURE — 02PYX3Z REMOVAL OF INFUSION DEVICE FROM GREAT VESSEL, EXTERNAL APPROACH: ICD-10-PCS | Performed by: HOSPITALIST

## 2019-06-15 PROCEDURE — 74011250636 HC RX REV CODE- 250/636: Performed by: INTERNAL MEDICINE

## 2019-06-15 PROCEDURE — 84100 ASSAY OF PHOSPHORUS: CPT

## 2019-06-15 PROCEDURE — 74011250636 HC RX REV CODE- 250/636: Performed by: HOSPITALIST

## 2019-06-15 PROCEDURE — 84478 ASSAY OF TRIGLYCERIDES: CPT

## 2019-06-15 PROCEDURE — 65270000029 HC RM PRIVATE

## 2019-06-15 PROCEDURE — 36415 COLL VENOUS BLD VENIPUNCTURE: CPT

## 2019-06-15 RX ORDER — POTASSIUM CHLORIDE 29.8 MG/ML
40 INJECTION INTRAVENOUS ONCE
Status: COMPLETED | OUTPATIENT
Start: 2019-06-15 | End: 2019-06-15

## 2019-06-15 RX ORDER — FLUCONAZOLE 2 MG/ML
200 INJECTION, SOLUTION INTRAVENOUS EVERY 24 HOURS
Status: DISCONTINUED | OUTPATIENT
Start: 2019-06-16 | End: 2019-06-15

## 2019-06-15 RX ORDER — IBUPROFEN 600 MG/1
600 TABLET ORAL
Status: DISCONTINUED | OUTPATIENT
Start: 2019-06-15 | End: 2019-06-17

## 2019-06-15 RX ADMIN — PIPERACILLIN SODIUM,TAZOBACTAM SODIUM 4.5 G: 4; .5 INJECTION, POWDER, FOR SOLUTION INTRAVENOUS at 07:36

## 2019-06-15 RX ADMIN — Medication 10 ML: at 22:00

## 2019-06-15 RX ADMIN — LEVOTHYROXINE SODIUM 125 MCG: 125 TABLET ORAL at 05:50

## 2019-06-15 RX ADMIN — POTASSIUM CHLORIDE 40 MEQ: 400 INJECTION, SOLUTION INTRAVENOUS at 09:42

## 2019-06-15 RX ADMIN — Medication 5 ML: at 05:50

## 2019-06-15 RX ADMIN — VITAMIN D, TAB 1000IU (100/BT) 2000 UNITS: 25 TAB at 21:50

## 2019-06-15 RX ADMIN — VITAMIN D, TAB 1000IU (100/BT) 2000 UNITS: 25 TAB at 07:36

## 2019-06-15 RX ADMIN — ACETAMINOPHEN 650 MG: 325 TABLET, FILM COATED ORAL at 03:12

## 2019-06-15 RX ADMIN — RIVAROXABAN 20 MG: 20 TABLET, FILM COATED ORAL at 07:37

## 2019-06-15 RX ADMIN — PIPERACILLIN SODIUM,TAZOBACTAM SODIUM 4.5 G: 4; .5 INJECTION, POWDER, FOR SOLUTION INTRAVENOUS at 15:52

## 2019-06-15 RX ADMIN — SODIUM CHLORIDE 200 MG: 900 INJECTION, SOLUTION INTRAVENOUS at 16:07

## 2019-06-15 RX ADMIN — TAMSULOSIN HYDROCHLORIDE 0.4 MG: 0.4 CAPSULE ORAL at 07:36

## 2019-06-15 RX ADMIN — OLANZAPINE 7.5 MG: 2.5 TABLET, FILM COATED ORAL at 21:50

## 2019-06-15 RX ADMIN — CLONAZEPAM 4 MG: 1 TABLET ORAL at 21:50

## 2019-06-15 RX ADMIN — Medication 10 ML: at 15:06

## 2019-06-15 RX ADMIN — AMITRIPTYLINE HYDROCHLORIDE 100 MG: 50 TABLET, FILM COATED ORAL at 21:50

## 2019-06-15 RX ADMIN — ACETAMINOPHEN 650 MG: 325 TABLET, FILM COATED ORAL at 17:45

## 2019-06-15 RX ADMIN — FLUCONAZOLE IN SODIUM CHLORIDE 400 MG: 2 INJECTION, SOLUTION INTRAVENOUS at 00:00

## 2019-06-15 RX ADMIN — IBUPROFEN 600 MG: 600 TABLET ORAL at 17:45

## 2019-06-15 NOTE — PROGRESS NOTES
Problem: Falls - Risk of  Goal: *Absence of Falls  Description  Document Vicki Pulliam Fall Risk and appropriate interventions in the flowsheet.   Outcome: Progressing Towards Goal     Problem: Patient Education: Go to Patient Education Activity  Goal: Patient/Family Education  Outcome: Progressing Towards Goal     Problem: Nutrition Deficit  Goal: *Optimize nutritional status  Outcome: Progressing Towards Goal     Problem: Urinary Tract Infection - Adult  Goal: *Absence of infection signs and symptoms  Outcome: Progressing Towards Goal

## 2019-06-15 NOTE — PROGRESS NOTES
Nutrition follow-up:  Nutrition Consult for General Nutrition Management:   TPN Management Marilou Sellers MD)  Assessment:  Food/Nutrition Patient History:   Pt well known to nutrition, last admission secondary dislodged R IJ CVC. Currently admitted with acute UTI. 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 Past medical history notable for rectal cancer > 5 years, chronic brito, recurrent SBO, abscesses, ileostomy, colostomy in 2014. TPN initiated 6/14 pm.  Pt reported by nursing to be consuming portions of full liquids diet. ONS added per MST protocol will continue at present to determine pt acceptance hx of varied use. Lab Results   Component Value Date/Time    Sodium 139 06/15/2019 06:12 AM    Potassium 3.2 (L) 06/15/2019 06:12 AM    Chloride 110 (H) 06/15/2019 06:12 AM    CO2 21 06/15/2019 06:12 AM    Anion gap 8 06/15/2019 06:12 AM    Glucose 168 (H) 06/15/2019 06:12 AM    BUN 15 06/15/2019 06:12 AM    Creatinine 0.92 06/15/2019 06:12 AM    Calcium 8.4 06/15/2019 06:12 AM    Albumin 1.9 (L) 06/15/2019 06:12 AM    Phosphorus 3.0 06/15/2019 06:12 AM   Mg 1.9,   Remarkable labs:   K 3.2 received 40meq KCL 6/14. AM glucose moderately elevated remains less than cut off for POC/SSI. Pertinent Medications:  Vit D3, Zofran, Zosyn    IV access: Tunneled CVC placed 5/20/19  Colostomy: 550ml output  Anthropometrics:Height: 5' 8.5\" (174 cm),  Weight: 56.7 kg (125 lb), Weight Source: Standing scale (comment), Body mass index is 18.73 kg/m². BMI class of underweight for age >71.     Meets Criteria for Malnutrition in the context of Chronic Illness   [] Severe Malnutrition, as evidenced by:   [] Severe loss of muscle mass   [] Nutritional intake of <75% of energy intake compared to estimated energy needs for > 1 month   [x] Weight loss of >5% in 1 month, >7.5% in 3 months,  >10% in 6 months, or >20% in 12 months   [] Severe edema   [] Severe loss of subcutaneous fat   [] Functional decline   [] Non-Severe (Moderate) Malnutrition, as evidenced by:   [x] Mild loss of muscle mass   [] Nutritional intake of <75% of energy intake compared to estimated energy needs for > 1 month   [] Weight loss of  5% in 1 month, 7.5% in 3 months, or 10% in 6 months,>20% in 12 months   [] Mild edema   [x] Mild loss of subcutaneous fat        Macronutrient Needs:  57 kg standing body wt 6/14/19  EER:  8532-4094 kcal /day (30-35 kcal/kg)  EPR:  68-86 grams protein/day (1.2-1.5 grams/kg) (GFR >60ml/min)  CHO limit per day: 328 grams CHO/day (4mg/kg/min/day)  Fluid/day: ~1.7-2 liters/day (1ml/kcal/day)  Diet: TPN ADULT-CENTRAL - dextrose 15% amino acid 5%  DIET FULL LIQUID  DIET NUTRITIONAL SUPPLEMENTS All Meals; Ensure Enlive    Intake/Comparative Standards:   Pt chronically unable to meet needs on full liquids. One meal of 75% recorded insufficient data to determine trend of oral intake at this time. Current TPN provides: 1693 kcal/d (99% of needs), 84 grams of protein/d (100% of needs), 252 grams of CHO/d (does not exceed maximum CHO load) and ~2026 ml of total volume/d ( 100% of needs). Nutrition Diagnosis:  Altered GI function related to EC fistula as evidenced by use of long term TPN. Intervention:  Meals and snacks: Continue Full Liquids per diet at [facility]. Parenteral Nutrition/IV fluid:   1. Continue current TPN: 15%DEX/ 5%AA at 74 ml/hr with 250 ml 20%   2. TPN additives: NaCl 40 mEq/L, 30 mEq/L KCl, 30 mEq/L KPhos, 4.5 mEq/L  CaGluconate, 8 mEq/L MgSulfate, MVI/TE. Vitamin and Mineral Supplement Therapy:  1. Nutrition support orders for electrolyte management replacement are active on the [facility]. 2.  KCl replacement repeated today. 3.  TPN labs, POC Glucoses/SSI if Glucose > 180 mg/dl on AM BMP. Coordination of Nutrition Care: Priscilla Rivera RN. Discharge Nutrition Needs: Anticipate patient will continue to require TPN on discharge.      [place], [address], [address]

## 2019-06-15 NOTE — PROGRESS NOTES
Assessment done via doc flow sheet. Pt resting quietly in bed, resp easy & regular, lung sounds diminished at the bases, room air. Colostomy to LLQ of abd intact, loose brown stool in pouch noted, hyperactive bowel sounds. Denies pain at this time. Bed low & locked, side rails x3 up with bed alarm set. Will monitor.

## 2019-06-15 NOTE — PROGRESS NOTES
Patient's central line was removed per MD order and ID recommendation. Line was intact and was removed with some resistance but cleared the insertion site completely. Vaseline gauze covered site with 2x2 and tegaderm. Five minutes of full pressure applied to site.

## 2019-06-15 NOTE — PROGRESS NOTES
Hospitalist     Admit Date:  2019  3:47 PM   Name:  Leann Bethea   Age:  74 y.o.  :  1945   MRN:  [id number]   PCP:  DO andrew Salgado   Patient is a 73 y/o male with hx rectal cancer, s/p resection, colostomy, SBO, hypothyroidism who presents to ED from SNF with fever and chills. Tmax in  with tachycardia. Lactic acid normal at 1.23. Patient could not provide urine speciment but was cathed and had 1 liter retained urine. UA shows small leukocyte esterase and 4+ bacteria. He had received 1 dose of IV zosyn by EMS. No N/V or abdominal or flank pain. He receives supplemental nutrition with TPN and lipids. Will admit for further treatment. Today, vitals normalizing, feels better, good appetite. Blood CS infected w/ yeast, on ch TPN. Objective:     Patient Vitals for the past 24 hrs:   Temp Pulse Resp BP SpO2   06/15/19 0445 99 °F (37.2 °C) 100 18 145/74 90 %   06/15/19 0003 99 °F (37.2 °C) (!) 106 18 139/68 92 %   19 2050 99.3 °F (37.4 °C) 92 18 156/64 95 %   19 1616 99.5 °F (37.5 °C) 96 18 129/74 94 %     Oxygen Therapy  O2 Sat (%): 90 % (06/15/19 0445)  Pulse via Oximetry: 99 beats per minute (19)  O2 Device: Room air (06/15/19 0746)    Intake/Output Summary (Last 24 hours) at 6/15/2019 1237  Last data filed at 2019 2249  Gross per 24 hour   Intake 100 ml   Output 550 ml   Net -450 ml       Physical Exam:  General:    Thin, cachectic, hoarse voice. Alert. CV:    RRR  No murmur, rub, or gallop. Lungs:  CTAB. No wheezing, rhonchi, or rales. Abdomen: Soft, nontender, nondistended. Bowel sounds normal.   Colostomy bag in place, brown liquid stool  Extremities: Warm and dry. No cyanosis or edema. Neurologic:  grossly intact. Skin:     No rashes or jaundice. No wounds. Psych:  Normal mood and affect. I reviewed the labs, imaging, EKGs, telemetry, and other studies done this admission.   Data Review:   Recent Results (from the past 24 hour(s))   METABOLIC PANEL, BASIC    Collection Time: 06/15/19  6:12 AM   Result Value Ref Range    Sodium 139 136 - 145 mmol/L    Potassium 3.2 (L) 3.5 - 5.1 mmol/L    Chloride 110 (H) 98 - 107 mmol/L    CO2 21 21 - 32 mmol/L    Anion gap 8 7 - 16 mmol/L    Glucose 168 (H) 65 - 100 mg/dL    BUN 15 8 - 23 MG/DL    Creatinine 0.92 0.8 - 1.5 MG/DL    GFR est AA >60 >60 ml/min/1.73m2    GFR est non-AA >60 >60 ml/min/1.73m2    Calcium 8.4 8.3 - 10.4 MG/DL   MAGNESIUM    Collection Time: 06/15/19  6:12 AM   Result Value Ref Range    Magnesium 1.9 1.8 - 2.4 mg/dL   PHOSPHORUS    Collection Time: 06/15/19  6:12 AM   Result Value Ref Range    Phosphorus 3.0 2.3 - 3.7 MG/DL   TRIGLYCERIDE    Collection Time: 06/15/19  6:12 AM   Result Value Ref Range    Triglyceride 177 (H) 35 - 150 MG/DL   HEPATIC FUNCTION PANEL    Collection Time: 06/15/19  6:12 AM   Result Value Ref Range    Protein, total 5.5 (L) 6.3 - 8.2 g/dL    Albumin 1.9 (L) 3.2 - 4.6 g/dL    Globulin 3.6 (H) 2.3 - 3.5 g/dL    A-G Ratio 0.5 (L) 1.2 - 3.5      Bilirubin, total 2.7 (H) 0.2 - 1.1 MG/DL    Bilirubin, direct 2.4 (H) <0.4 MG/DL    Alk.  phosphatase 637 (H) 50 - 136 U/L    AST (SGOT) 84 (H) 15 - 37 U/L    ALT (SGPT) 136 (H) 12 - 65 U/L       Imaging Studies:  CXR Results  (Last 48 hours)    None        CT Results  (Last 48 hours)    None          Assessment and Plan:     Hospital Problems as of 6/15/2019 Date Reviewed: 12/17/2018          Codes Class Noted - Resolved POA    Hypothyroid ICD-10-CM: E03.9  ICD-9-CM: 244.9  6/14/2019 - Present Yes    Overview Signed 6/14/2019 12:12 AM by Adelso Menjivar MD     stable w/med             Fever ICD-10-CM: R50.9  ICD-9-CM: 780.60  6/13/2019 - Present Yes        * (Principal) Acute UTI (urinary tract infection) ICD-10-CM: N39.0  ICD-9-CM: 599.0  6/13/2019 - Present Yes        Anemia ICD-10-CM: D64.9  ICD-9-CM: 285.9  6/13/2019 - Present Yes        Elevated LFTs ICD-10-CM: R94.5  ICD-9-CM: 790.6  6/13/2019 - Present Yes        Malnutrition ([facility] [address] ICD-10-CM: E46  ICD-9-CM: 263.9  5/19/2019 - Present Yes        On total parenteral nutrition (TPN) ICD-10-CM: Z78.9  ICD-9-CM: V49.89  5/19/2019 - Present Yes        Colostomy care [facility]) ICD-10-CM: Z43.3  ICD-9-CM: V55.3  5/19/2019 - Present Yes              PLAN:    Continue Zosyn  Diflucan iv  Follow cultures  ID consulted  TPN  K replacement    Dispo: back to [facility]    Signed:  Catarina Alcantar MD

## 2019-06-15 NOTE — PROGRESS NOTES
Chart reviewed, but patient not seen today. We are being consulted because of significant yeast in the bloodstream.  Patient was septic upon presentation to the emergency department. He has a history of being on chronic TPN. Current guidelines would recommend use of an echinocandin until we know the speciation of the yeast in the sensitivities. I have placed him on Eraxis with a loading dose today and standard dose beginning tomorrow. Given the degree of yeast in the blood, unless another source is grossly obvious, I would recommend removal of the line. He may have to go without TPN for a short period of time to give him a line holiday to clear this infection. He will need an echocardiogram, but I usually recommend waiting until yeast is clear the bloodstream before checking. I would check repeat blood cultures 24 hours post removal of the line. We may consider scanning his abdomen, but I will wait until I see him before we make this decision. Infectious disease will plan on seeing the patient tomorrow unless called. He will need an ophthalmologic evaluation to rule out endophthalmitis.     Thank you

## 2019-06-15 NOTE — PROGRESS NOTES
Problem: Pressure Injury - Risk of  Goal: *Prevention of pressure injury  Description  Document Josef Scale and appropriate interventions in the flowsheet. Outcome: Progressing Towards Goal  Note:   Pressure Injury Interventions:       Moisture Interventions: Absorbent underpads    Activity Interventions: Pressure redistribution bed/mattress(bed type)    Mobility Interventions: Pressure redistribution bed/mattress (bed type)    Nutrition Interventions: Document food/fluid/supplement intake    Friction and Shear Interventions: Apply protective barrier, creams and emollients                Problem: Falls - Risk of  Goal: *Absence of Falls  Description  Document Genie Fall Risk and appropriate interventions in the flowsheet.   Outcome: Progressing Towards Goal     Problem: Nutrition Deficit  Goal: *Optimize nutritional status  Outcome: Progressing Towards Goal

## 2019-06-15 NOTE — PROGRESS NOTES
Patient with temperature of 103.5 axillary. Spoke with Dr. Salima Tang and upon recommendation from ID, received order to pull patient's PICC line. Received order for Motrin 600 mg PO Q6 hours prn fever, migraine, moderate pain.

## 2019-06-15 NOTE — PROGRESS NOTES
Shift assessment done. Pt AAO x4. Respirations even and unlabored. HR regular. Abdomen soft with hyperactive bowel sounds. Colostomy bag at LLQ of the abdomen. Denies needs and pain this time. Safety measures provided. Call light in reach. Will continue to monitor.

## 2019-06-16 LAB
BACTERIA SPEC CULT: ABNORMAL
GRAM STN SPEC: ABNORMAL
SERVICE CMNT-IMP: ABNORMAL
SERVICE CMNT-IMP: ABNORMAL

## 2019-06-16 PROCEDURE — 74011000258 HC RX REV CODE- 258: Performed by: HOSPITALIST

## 2019-06-16 PROCEDURE — 74011250636 HC RX REV CODE- 250/636: Performed by: INTERNAL MEDICINE

## 2019-06-16 PROCEDURE — 74011000258 HC RX REV CODE- 258: Performed by: INTERNAL MEDICINE

## 2019-06-16 PROCEDURE — 74011250637 HC RX REV CODE- 250/637: Performed by: HOSPITALIST

## 2019-06-16 PROCEDURE — 74011250636 HC RX REV CODE- 250/636: Performed by: HOSPITALIST

## 2019-06-16 PROCEDURE — 65270000029 HC RM PRIVATE

## 2019-06-16 PROCEDURE — 77030020256 HC SOL INJ NACL 0.9%  500ML

## 2019-06-16 RX ORDER — POTASSIUM CHLORIDE, DEXTROSE MONOHYDRATE AND SODIUM CHLORIDE 300; 5; 900 MG/100ML; G/100ML; MG/100ML
INJECTION, SOLUTION INTRAVENOUS CONTINUOUS
Status: DISCONTINUED | OUTPATIENT
Start: 2019-06-16 | End: 2019-06-16

## 2019-06-16 RX ORDER — ZOLPIDEM TARTRATE 5 MG/1
5 TABLET ORAL
Status: DISCONTINUED | OUTPATIENT
Start: 2019-06-16 | End: 2019-06-19 | Stop reason: HOSPADM

## 2019-06-16 RX ORDER — LANOLIN ALCOHOL/MO/W.PET/CERES
400 CREAM (GRAM) TOPICAL DAILY
Status: DISCONTINUED | OUTPATIENT
Start: 2019-06-16 | End: 2019-06-19 | Stop reason: SDUPTHER

## 2019-06-16 RX ORDER — POTASSIUM CHLORIDE 20 MEQ/1
40 TABLET, EXTENDED RELEASE ORAL 2 TIMES DAILY
Status: DISCONTINUED | OUTPATIENT
Start: 2019-06-16 | End: 2019-06-16

## 2019-06-16 RX ADMIN — ZOLPIDEM TARTRATE 5 MG: 5 TABLET ORAL at 22:37

## 2019-06-16 RX ADMIN — SODIUM CHLORIDE 500 ML: 900 INJECTION, SOLUTION INTRAVENOUS at 00:50

## 2019-06-16 RX ADMIN — VITAMIN D, TAB 1000IU (100/BT) 2000 UNITS: 25 TAB at 07:51

## 2019-06-16 RX ADMIN — PIPERACILLIN SODIUM,TAZOBACTAM SODIUM 4.5 G: 4; .5 INJECTION, POWDER, FOR SOLUTION INTRAVENOUS at 15:08

## 2019-06-16 RX ADMIN — Medication 10 ML: at 22:36

## 2019-06-16 RX ADMIN — POTASSIUM CHLORIDE 40 MEQ: 20 TABLET, EXTENDED RELEASE ORAL at 07:54

## 2019-06-16 RX ADMIN — CLONAZEPAM 4 MG: 1 TABLET ORAL at 22:36

## 2019-06-16 RX ADMIN — DEXTROSE MONOHYDRATE, SODIUM CHLORIDE, AND POTASSIUM CHLORIDE: 50; 9; 2.98 INJECTION, SOLUTION INTRAVENOUS at 12:32

## 2019-06-16 RX ADMIN — PIPERACILLIN SODIUM,TAZOBACTAM SODIUM 4.5 G: 4; .5 INJECTION, POWDER, FOR SOLUTION INTRAVENOUS at 00:14

## 2019-06-16 RX ADMIN — RIVAROXABAN 20 MG: 20 TABLET, FILM COATED ORAL at 07:52

## 2019-06-16 RX ADMIN — Medication 10 ML: at 07:43

## 2019-06-16 RX ADMIN — OLANZAPINE 7.5 MG: 2.5 TABLET, FILM COATED ORAL at 22:36

## 2019-06-16 RX ADMIN — PIPERACILLIN SODIUM,TAZOBACTAM SODIUM 4.5 G: 4; .5 INJECTION, POWDER, FOR SOLUTION INTRAVENOUS at 07:43

## 2019-06-16 RX ADMIN — TAMSULOSIN HYDROCHLORIDE 0.4 MG: 0.4 CAPSULE ORAL at 07:51

## 2019-06-16 RX ADMIN — SODIUM CHLORIDE 500 ML: 900 INJECTION, SOLUTION INTRAVENOUS at 13:04

## 2019-06-16 RX ADMIN — VITAMIN D, TAB 1000IU (100/BT) 2000 UNITS: 25 TAB at 22:36

## 2019-06-16 RX ADMIN — AMITRIPTYLINE HYDROCHLORIDE 100 MG: 50 TABLET, FILM COATED ORAL at 22:36

## 2019-06-16 RX ADMIN — SODIUM CHLORIDE 100 MG: 9 INJECTION, SOLUTION INTRAVENOUS at 15:08

## 2019-06-16 RX ADMIN — Medication 10 ML: at 15:08

## 2019-06-16 RX ADMIN — LEVOTHYROXINE SODIUM 125 MCG: 125 TABLET ORAL at 06:44

## 2019-06-16 RX ADMIN — Medication 400 MG: at 07:54

## 2019-06-16 NOTE — PROGRESS NOTES
Problem: Falls - Risk of  Goal: *Absence of Falls  Description  Document Bishop Flores Fall Risk and appropriate interventions in the flowsheet. Outcome: Progressing Towards Goal     Problem: Pressure Injury - Risk of  Goal: *Prevention of pressure injury  Description  Document Josef Scale and appropriate interventions in the flowsheet.   Outcome: Progressing Towards Goal     Problem: Nutrition Deficit  Goal: *Optimize nutritional status  Outcome: Progressing Towards Goal     Problem: Urinary Tract Infection - Adult  Goal: *Absence of infection signs and symptoms  Outcome: Progressing Towards Goal

## 2019-06-16 NOTE — CONSULTS
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 If UTI vs colonization. .. He did not report acute UTI symptoms to me and the symptoms he had could be due to the candidemia? Plan:   ·  Will await final ID before changing to diflucan  · Hopefully, the patient will not necessitate ongoing TPN  · He does report some blurry vision, so Ophthalmology exam will be important, but can wait a day or two? · Recommend TTE once the candidemia appears to be clearing  · Appreciate prompt removal of line  · Consider imaging the abdomen and pelvis? Anti-infectives:   · Eraxis (6/15-  · Zosyn (6/14-    Subjective:   Date of Consultation:  June 16, 2019  Referring Physician: Zainab     Patient is a 74 y.o. male with hx rectal cancer s/p resection/colostomy, SBO, hypothyroidism, chronic TPN via L IJ CVC, who presents to ED from SNF with fever and chills. Tmax in  without leukocytosis. Zosyn given during transport. BCx3 sets (two peripheral and one PICC) + yeast, PCR C albicans. UA 4+ bacteria, 0-3 WBC, small leukocytes, neg nitrites. He was started on echinocandin. CVC removed yesterday. He is not the best historian, but he reports he has necessitated the TPN for 8-10 weeks. He also notes that they were considering stopping the TPN \"soon. \" He was feeling weak and having some trouble ambulating for the week leading up to admission. He notes blurring of vision. He denied charli dysuria. He noted that in the recent past he had difficulty with voiding, but not in the last week? No acute cp or sob.      Patient Active Problem List   Diagnosis Code    Rectal cancer ([address] C20    Weight loss R63.4    Constipation K59.00    Postoperative ileus ([address] K91.89, K56.7    Ileus ([address] K56.7    Dehydration E86.0    Attention to ileostomy ([address] Z43.2  Severe sepsis with acute organ dysfunction ([facility]) A41.9, R65.20    Hypoxemia R09.02    Peritonitis (acute) generalized K65.0    Hypokalemia E87.6    Hypomagnesemia E83.42    Pleural effusion J90    Hypernatremia E87.0    Hypercalcemia of malignancy E83.52    Fecal incontinence R15.9    Bowel incontinence R15.9    Abdominal wall abscess L02. 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    Malnutrition ([facility] [address] E46    On total parenteral nutrition (TPN) Z78.9    Colostomy care ([facility] [address] Z43.3    Chronic indwelling Bishop catheter Z96.0    Moderate protein-calorie malnutrition ([facility]) E44.0    Acute deep vein thrombosis (DVT) of non-extremity vein I82.90    DVT (deep vein thrombosis) in pregnancy ([facility]) O22.30, I82.409    Acute blood loss anemia D62    Fever R50.9    Acute UTI (urinary tract infection) N39.0    Anemia D64.9    Elevated LFTs R94.5    Hypothyroid E03.9     Past Medical History:   Diagnosis Date    Acute deep vein thrombosis (DVT) of non-extremity vein 5/20/2019    Family history of malignant neoplasm of gastrointestinal tract     mother colon/ovarian cancer 67    Fecal incontinence     LAR syndrome    Former cigarette smoker     History of rectal cancer     Hypothyroid     stable w/med    Infection and inflammatory reaction due to other internal prosthetic devices, implants and grafts, subsequent encounter 2017    abd wound/mesh colostomy    Insomnia     takes meds    Osteoarthritis, hand     Personal history of colonic polyps 9/2013    x 1    Personal history of malignant neoplasm of rectum, rectosigmoid junction, and anus 9/2013    Psychiatric disorder     anxiety      Family History   Problem Relation Age of Onset    Cancer Mother         colon and ovarian    Cancer Brother prostate    Alcohol abuse Brother     Cancer Maternal Grandmother         bone    Alcohol abuse Father     Alcohol abuse Sister     Stroke Paternal Grandfather       Social History     Tobacco Use    Smoking status: Former Smoker     Last attempt to quit: 1963     Years since quittin.8    Smokeless tobacco: Never Used   Substance Use Topics    Alcohol use: Not Currently     Alcohol/week: 7.0 oz     Types: 14 Cans of beer per week     Comment: advised stop drinking given issues     Past Surgical History:   Procedure Laterality Date    COLONOSCOPY N/A 2018    COLONOSCOPY / BMI=21 performed by Esau Guerra MD at [address] [place] [address]  2018         ENDOSCOPY, COLON, DIAGNOSTIC  last 2/3/15    Emily--no polyps--3 year recall    HX COLECTOMY  2013    Emily--lap LAR with coloproctostomy, mobilization splenic flexure, diverting loop ileostomy    HX COLECTOMY Right 2014    for leak    HX COLOSTOMY  16    HX GI  2016    Sacral nerve stimlator lead trial (unsucessful) and removal    HX HERNIA REPAIR  3/2015, 2016    HX HERNIA REPAIR      and Colostomy in May    HX OTHER SURGICAL  10/7/2013    Emily---endorectal us    HX OTHER SURGICAL Bilateral     cataracts      HX POLYPECTOMY      HX VASCULAR ACCESS Left     single lumen port/subsequently removed    IR INSERT TUNL CVC W/O PORT OVER 5 YR  2019    IR REPLACE CVC TUNNELED W/O PORT  2019      Prior to Admission medications    Medication Sig Start Date End Date Taking? Authorizing Provider   zolpidem (AMBIEN) 5 mg tablet Take 5 mg by mouth nightly as needed. Provider, Historical   TPN ADULT - CENTRAL by IntraVENous route continuous. Provider, Historical   clonazePAM (KLONOPIN) 2 mg tablet Take 2 Tabs by mouth nightly. Max Daily Amount: 4 mg.  Indications: takes for sleep 19   Eran HANSON,    rivaroxaban (XARELTO) 20 mg tab tablet Take 1 Tab by mouth daily (with dinner). 19   Emanuel Hernandez, DO   tamsulosin (FLOMAX) 0.4 mg capsule Take 0.4 mg by mouth daily. Provider, Historical   OLANZapine (ZYPREXA) 2.5 mg tablet Take 7.5 mg by mouth nightly. Indications: Takes 3 x 2.5 mg tabs    Provider, Historical   levothyroxine (SYNTHROID) 125 mcg tablet TAKE ONE TABLET BY MOUTH ONE TIME DAILY BEFORE BREAKFAST 10/15/18   Carrie PHAN, DO   Cholecalciferol, Vitamin D3, (VITAMIN D3) 2,000 unit cap capsule Take 2,000 Units by mouth two (2) times a day. 10/15/18   NickRoger Morin, DO   amitriptyline (ELAVIL) 100 mg tablet Take 100 mg by mouth nightly. Dr Tonia Cooks psych  Indications: takes for sleep    Provider, Historical       No Known Allergies     Review of Systems:  A comprehensive review of systems was negative except for that written in the History of Present Illness. Objective:     Visit Vitals  /53 (BP 1 Location: Right arm)   Pulse 88   Temp 97.4 °F (36.3 °C)   Resp 18   Ht 5' 8.5\" (1.74 m)   Wt 60.1 kg (132 lb 7.9 oz)   SpO2 96%   BMI 19.85 kg/m²     Temp (24hrs), Av.7 °F (37.6 °C), Min:96.3 °F (35.7 °C), Max:103.5 °F (39.7 °C)       Lines:  Peripheral IV:       Physical Exam:    General:  Alert, cooperative, well developed, appears stated age, appears debilitated   Eyes:  Sclera anicteric. Pupils equally round and reactive to light. Mouth/Throat: Mucous membranes normal, oral pharynx clear   Neck: Supple, no LAD   Lungs:   Clear to auscultation bilaterally, good effort   CV:  Regular rate and rhythm,no murmur, click, rub or gallop   Abdomen:   Soft, non-tender.  bowel sounds normal. non-distended, ostomy benign   Extremities: No cyanosis or edema   Skin: Skin color, texture, turgor normal. no acute rash or lesions   Musculoskeletal: No swelling or deformity   Lines/Devices:  Central line now removed   Psych: Alert and oriented, normal mood affect given the setting       Data Review:     CBC:  Recent Labs     19  3995 06/13/19  1600   WBC 5.8 6.8   GRANS 81* 89*   MONOS 11 7   EOS 1 0*   ANEU 4.7 6.0   ABL 0.4* 0.3*   HGB 8.2* 9.0*   HCT 26.5* 28.0*   * 127*       BMP:  Recent Labs     06/15/19  0612 06/14/19  0406 06/13/19  1600   CREA 0.92 0.83 0.78*   BUN 15 17 23    136 133*   K 3.2* 3.4* 3.7   * 108* 100   CO2 21 21 23   AGAP 8 7 10   * 119* 92       LFTS:  Recent Labs     06/15/19  0612 06/14/19  0406 06/13/19  1600   TBILI 2.7* 2.5* 3.2*   * 134* 179*   SGOT 84* 80* 115*   * 441* 577*   TP 5.5* 5.4* 6.5   ALB 1.9* 2.0* 2.4*       Microbiology:     All Micro Results     Procedure Component Value Units Date/Time    CULTURE, BLOOD [[id number]]  (Abnormal) Collected:  06/13/19 1600    Order Status:  Completed Specimen:  Blood Updated:  06/16/19 0704     Special Requests: --        LEFT  Antecubital       GRAM STAIN YEAST         AEROBIC BOTTLE POSITIVE               CRITICAL RESULT NOT CALLED DUE TO PREVIOUS NOTIFICATION OF CRITICAL RESULT WITHIN THE LAST 24 HOURS.            Culture result: YEAST         REFER TO ACC [id number] FOR ID    CULTURE, BLOOD [[id number]]  (Abnormal) Collected:  06/13/19 1600    Order Status:  Completed Specimen:  Blood Updated:  06/16/19 0703     Special Requests: --        RIGHT  FOREARM       GRAM STAIN YEAST         AEROBIC BOTTLE POSITIVE               RESULTS VERIFIED, PHONED TO AND READ BACK BY MARIA DEL ROSARIO COTA 3RD FLOOR AT 8105 ON 6/15/19 [facility]           Culture result:       JHOANA ALBICANS DETECTED Test Performed by Multiplex PCR                  RESULTS VERIFIED, PHONED TO AND READ BACK BY  Manuela Payton RN 5112 6.15.19       CULTURE, URINE [[id number]]  (Abnormal) Collected:  06/13/19 1828    Order Status:  Completed Specimen:  Urine from Bishop Specimen Updated:  06/15/19 1527     Special Requests: NO SPECIAL REQUESTS        Culture result:       >100,000 COLONIES/mL GRAM POSITIVE COCCI SUBCULTURE IN PROGRESS          CULTURE, BLOOD [[id number]] Collected: 06/13/19 1626    Order Status:  Completed Specimen:  Blood Updated:  06/15/19 1301     Special Requests: CENTRAL        GRAM STAIN YEAST         AEROBIC BOTTLE POSITIVE               RESULTS VERIFIED, PHONED TO AND READ BACK BY Brittany Grant TO Jojo Moore 95 ON [email or web address]           Culture result: YEAST               Specimen sent to Reference Laboratory for testing  6.15.19        SEE REPORT FROM [facility]  [id number]             Imaging:   None new    Signed By: Khadijah Peterson NP     June 16, 2019

## 2019-06-16 NOTE — PROGRESS NOTES
Nutrition follow-up:  6/16: Nutrition Consult - ? If pt can stop TPN and manage intake with supplements Geovany Lamb MD)  6/14: Nutrition Consult for General Nutrition Management:   TPN Management Geovany Lamb MD)  Assessment:  Food/Nutrition Patient History:   Pt well known to nutrition, last admission secondary dislodged R IJ CVC. Currently admitted with acute UTI. 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 Past medical history notable for rectal cancer > 5 years, chronic brito, recurrent SBO, abscesses, ileostomy, colostomy in 2014.    Chart reviewed from [facility], case discussed with RN via phone. Line removed yesterday, TPN and lipids held as a result. Pt originally placed on TPN in setting of EC fistula, diet has not progressed beyond liquids since that time. Dr. Sonali Gonzalez is patient's primary surgeon would defer question of progressing his diet beyond full liquids to him. Historically, pt has not met needs orally initiating prior to [facility] fistula would anticipate the same results even if diet were increased now. No new labs today, BMP changed to MWF by Dr. Toño Vinson. Pertinent Medications:  Vit D3, Zofran, Zosyn, Mg Ox, KDur   IV access: Central Line removed 6/16 secondary yeast  Colostomy: No output recorded for 6/15  Anthropometrics:Height: 5' 8.5\" (174 cm),  Weight: 56.7 kg (125 lb), Weight Source: Standing scale (comment), Body mass index is 18.73 kg/m². BMI class of underweight for age >71.   Weight: 60.1 kg (132 lb 7.9 oz) bed weight 6/15 increase likely a result of different weight source   Meets Criteria for Malnutrition in the context of Chronic Illness   [] Severe Malnutrition, as evidenced by:              [] Severe loss of muscle mass              [] Nutritional intake of <75% of energy intake compared to estimated energy needs for > 1 month              [x] Weight loss of >5% in 1 month, >7.5% in 3 months,  >10% in 6 months, or >20% in 12 months              [] Severe edema              [] Severe loss of subcutaneous fat              [] Functional decline   [] Non-Severe (Moderate) Malnutrition, as evidenced by:              [x] Mild loss of muscle mass              [] Nutritional intake of <75% of energy intake compared to estimated energy needs for > 1 month              [] Weight loss of  5% in 1 month, 7.5% in 3 months, or 10% in 6 months,>20% in 12 months              [] Mild edema              [x] Mild loss of subcutaneous fat                    Macronutrient Needs:  57 kg standing body wt 6/14/19  EER:  6409-3190 kcal /day (30-35 kcal/kg)  EPR:  68-86 grams protein/day (1.2-1.5 grams/kg) (GFR >60ml/min)  CHO limit per day: 328 grams CHO/day (4mg/kg/min/day)  Fluid/day: ~1.7-2 liters/day (1ml/kcal/day)  Diet: DIET FULL LIQUID  DIET NUTRITIONAL SUPPLEMENTS All Meals; Ensure Enlive     Intake/Comparative Standards:   TPN discontinued secondary no access. No recorded meals or supplements for 6/15. RN reports this am pt consumed the portion of grits. This pattern will potentially meet <25% estimated needs for kcal and protein. Nutrition Diagnosis:  Inadequate energy intake related to poor oral intake as evidenced by historical inadequate oral intake, current po pattern will meet <25% estimated needs if trend sustained in setting of chronic TPN being discontinued secondary lack of access.     Intervention:  Meals and snacks:   1. Continue Full Liquids per diet at [facility]. 2.  Recommend consult surgery to determine if diet may advance. 3.  Initiate calorie count to validate oral intake. Nutrition Supplement Therapy:  1. Continue Ensure Enlive TID. 2.  Add Magic Cup BID. 3.  Reinforced with RN the importanceeof oral supplements as they contribute more calories and protein than majority of items on full liquids. Parenteral Nutrition/IV fluid:   1. Discontinued TPN and lipid orders secondary no access at present. Vitamin and Mineral Supplement Therapy:  1.   Nutrition support orders for electrolyte management replacement are active on the [facility]. Coordination of Nutrition Care: Collaboration of care via phone with Rosa M Schafer RN. Discharge Nutrition Needs: Anticipate patient will continue to require TPN on discharge.      [place], [address], [address]

## 2019-06-16 NOTE — PROGRESS NOTES
Hospitalist     Admit Date:  2019  3:47 PM   Name:  Ismael Barillas   Age:  74 y.o.  :  1945   MRN:  [id number]   PCP:  DO andrew Flores   Patient is a 73 y/o male with hx rectal cancer, s/p resection, colostomy, SBO, hypothyroidism who presents to ED from SNF with fever and chills. Tmax in  with tachycardia. Lactic acid normal at 1.23. Patient could not provide urine speciment but was cathed and had 1 liter retained urine. UA shows small leukocyte esterase and 4+ bacteria. He had received 1 dose of IV zosyn by EMS. No N/V or abdominal or flank pain. He receives supplemental nutrition with TPN and lipids. Will admit for further treatment. Today, vitals normalizing, feels better, good appetite. Blood CS infected w/ yeast, on ch TPN. Objective:     Patient Vitals for the past 24 hrs:   Temp Pulse Resp BP SpO2   19 1530  88  91/50    19 1230 97.4 °F (36.3 °C) 90 16 (!) 76/42 98 %   19 0857 97.4 °F (36.3 °C) 88 18 101/53 96 %   19 0419    94/52    19 0412 97.5 °F (36.4 °C) 90 22 (!) 88/47 91 %   06/15/19 2353 96.3 °F (35.7 °C) 91 20 (!) 86/60 90 %   06/15/19 2103    92/47    06/15/19 2017 100.2 °F (37.9 °C) (!) 111 22 (!) 80/51 96 %   06/15/19 1743 (!) 103.5 °F (39.7 °C) (!) 131 21 128/64 94 %   06/15/19 1726 (!) 103.5 °F (39.7 °C) (!) 101 24       Oxygen Therapy  O2 Sat (%): 98 % (19 1230)  Pulse via Oximetry: 99 beats per minute (19 1940)  O2 Device: Room air (06/15/19 0746)  No intake or output data in the 24 hours ending 19 5628    Physical Exam:  General:    Thin, cachectic, hoarse voice. Alert. CV:    RRR  No murmur, rub, or gallop. Lungs:  CTAB. No wheezing, rhonchi, or rales. Abdomen: Soft, nontender, nondistended. Bowel sounds normal.   Colostomy bag in place, brown liquid stool  Extremities: Warm and dry. No cyanosis or edema. Neurologic:  grossly intact. Skin:     No rashes or jaundice.   No wounds. Psych:  Normal mood and affect. I reviewed the labs, imaging, EKGs, telemetry, and other studies done this admission. Data Review:   No results found for this or any previous visit (from the past 24 hour(s)).     Imaging Studies:  CXR Results  (Last 48 hours)    None        CT Results  (Last 48 hours)    None          Assessment and Plan:     Hospital Problems as of 6/16/2019 Date Reviewed: 12/17/2018          Codes Class Noted - Resolved POA    Hypothyroid ICD-10-CM: E03.9  ICD-9-CM: 244.9  6/14/2019 - Present Yes    Overview Signed 6/14/2019 12:12 AM by Nickolas Barnard MD     stable w/med             Fever ICD-10-CM: R50.9  ICD-9-CM: 780.60  6/13/2019 - Present Yes        * (Principal) Acute UTI (urinary tract infection) ICD-10-CM: N39.0  ICD-9-CM: 599.0  6/13/2019 - Present Yes        Anemia ICD-10-CM: D64.9  ICD-9-CM: 285.9  6/13/2019 - Present Yes        Elevated LFTs ICD-10-CM: R94.5  ICD-9-CM: 790.6  6/13/2019 - Present Yes        Malnutrition ([address] ICD-10-CM: E46  ICD-9-CM: 263.9  5/19/2019 - Present Yes        On total parenteral nutrition (TPN) ICD-10-CM: Z78.9  ICD-9-CM: V49.89  5/19/2019 - Present Yes        Colostomy care [facility]) ICD-10-CM: Z43.3  ICD-9-CM: V55.3  5/19/2019 - Present Yes              PLAN:    Continue anti-microbials per ID  Follow cultures  TPN per dietitian  K replacement    Dispo: back to [facility]    Signed:  Ignacio Coelho MD

## 2019-06-16 NOTE — PROGRESS NOTES
Assessment done via doc flow sheet. Pt resting queitly in bed, sleeping, easily awakens with voice. Resp easy & regular, lungs CTAB, denies pain. Bed low & locked, side rails x3 up , bed alarm set and call light within reach. Pt instructed to call for assistance as needed.

## 2019-06-17 LAB
ANION GAP SERPL CALC-SCNC: 7 MMOL/L (ref 7–16)
BACTERIA SPEC CULT: ABNORMAL
BUN SERPL-MCNC: 29 MG/DL (ref 8–23)
CALCIUM SERPL-MCNC: 8.2 MG/DL (ref 8.3–10.4)
CHLORIDE SERPL-SCNC: 113 MMOL/L (ref 98–107)
CO2 SERPL-SCNC: 23 MMOL/L (ref 21–32)
CREAT SERPL-MCNC: 0.87 MG/DL (ref 0.8–1.5)
GLUCOSE SERPL-MCNC: 101 MG/DL (ref 65–100)
MAGNESIUM SERPL-MCNC: 2 MG/DL (ref 1.8–2.4)
PHOSPHATE SERPL-MCNC: 3.2 MG/DL (ref 2.3–3.7)
POTASSIUM SERPL-SCNC: 3.7 MMOL/L (ref 3.5–5.1)
SERVICE CMNT-IMP: ABNORMAL
SODIUM SERPL-SCNC: 143 MMOL/L (ref 136–145)

## 2019-06-17 PROCEDURE — 65270000029 HC RM PRIVATE

## 2019-06-17 PROCEDURE — 36415 COLL VENOUS BLD VENIPUNCTURE: CPT

## 2019-06-17 PROCEDURE — 87040 BLOOD CULTURE FOR BACTERIA: CPT

## 2019-06-17 PROCEDURE — 74011250637 HC RX REV CODE- 250/637: Performed by: HOSPITALIST

## 2019-06-17 PROCEDURE — 84100 ASSAY OF PHOSPHORUS: CPT

## 2019-06-17 PROCEDURE — 83735 ASSAY OF MAGNESIUM: CPT

## 2019-06-17 PROCEDURE — 74011000258 HC RX REV CODE- 258: Performed by: INTERNAL MEDICINE

## 2019-06-17 PROCEDURE — 80048 BASIC METABOLIC PNL TOTAL CA: CPT

## 2019-06-17 PROCEDURE — 74011250636 HC RX REV CODE- 250/636: Performed by: HOSPITALIST

## 2019-06-17 PROCEDURE — 74011250636 HC RX REV CODE- 250/636: Performed by: INTERNAL MEDICINE

## 2019-06-17 PROCEDURE — 77030019605

## 2019-06-17 PROCEDURE — 74011000258 HC RX REV CODE- 258: Performed by: HOSPITALIST

## 2019-06-17 RX ORDER — AMITRIPTYLINE HYDROCHLORIDE 100 MG/1
100 TABLET, FILM COATED ORAL
Qty: 15 TAB | Refills: 0 | Status: SHIPPED | OUTPATIENT
Start: 2019-06-17

## 2019-06-17 RX ORDER — AMPICILLIN 500 MG/1
500 CAPSULE ORAL EVERY 8 HOURS
Qty: 15 CAP | Refills: 0 | Status: SHIPPED | OUTPATIENT
Start: 2019-06-17 | End: 2019-06-19 | Stop reason: SDUPTHER

## 2019-06-17 RX ORDER — AMPICILLIN 500 MG/1
500 CAPSULE ORAL EVERY 8 HOURS
Status: DISCONTINUED | OUTPATIENT
Start: 2019-06-17 | End: 2019-06-19 | Stop reason: HOSPADM

## 2019-06-17 RX ORDER — CLONAZEPAM 2 MG/1
4 TABLET ORAL
Qty: 15 TAB | Refills: 0 | Status: ON HOLD | OUTPATIENT
Start: 2019-06-17 | End: 2021-09-28 | Stop reason: SDUPTHER

## 2019-06-17 RX ADMIN — VITAMIN D, TAB 1000IU (100/BT) 2000 UNITS: 25 TAB at 22:10

## 2019-06-17 RX ADMIN — Medication 400 MG: at 08:55

## 2019-06-17 RX ADMIN — CLONAZEPAM 4 MG: 1 TABLET ORAL at 22:09

## 2019-06-17 RX ADMIN — RIVAROXABAN 20 MG: 20 TABLET, FILM COATED ORAL at 08:55

## 2019-06-17 RX ADMIN — AMITRIPTYLINE HYDROCHLORIDE 100 MG: 50 TABLET, FILM COATED ORAL at 22:09

## 2019-06-17 RX ADMIN — Medication 5 ML: at 14:26

## 2019-06-17 RX ADMIN — SODIUM CHLORIDE 100 MG: 9 INJECTION, SOLUTION INTRAVENOUS at 17:37

## 2019-06-17 RX ADMIN — AMPICILLIN 500 MG: 500 CAPSULE ORAL at 14:26

## 2019-06-17 RX ADMIN — TAMSULOSIN HYDROCHLORIDE 0.4 MG: 0.4 CAPSULE ORAL at 08:55

## 2019-06-17 RX ADMIN — PIPERACILLIN SODIUM,TAZOBACTAM SODIUM 4.5 G: 4; .5 INJECTION, POWDER, FOR SOLUTION INTRAVENOUS at 08:55

## 2019-06-17 RX ADMIN — PIPERACILLIN SODIUM,TAZOBACTAM SODIUM 4.5 G: 4; .5 INJECTION, POWDER, FOR SOLUTION INTRAVENOUS at 00:10

## 2019-06-17 RX ADMIN — OLANZAPINE 7.5 MG: 2.5 TABLET, FILM COATED ORAL at 22:09

## 2019-06-17 RX ADMIN — VITAMIN D, TAB 1000IU (100/BT) 2000 UNITS: 25 TAB at 08:55

## 2019-06-17 RX ADMIN — LEVOTHYROXINE SODIUM 125 MCG: 125 TABLET ORAL at 06:35

## 2019-06-17 RX ADMIN — Medication 5 ML: at 22:12

## 2019-06-17 RX ADMIN — AMPICILLIN 500 MG: 500 CAPSULE ORAL at 22:09

## 2019-06-17 RX ADMIN — ZOLPIDEM TARTRATE 5 MG: 5 TABLET ORAL at 22:10

## 2019-06-17 NOTE — PROGRESS NOTES
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 If UTI vs colonization  · Urinary retention   · SBO S/p ANTHONY, , complicated by delayed ileus, EC fistula,     Plan:   · Continue anidulafungin at this time. · Will ask ophthalmology to see regarding blurry vision in the setting of Candidemia. · Await f/up BCX    Anti-infectives:   · Eraxis (6/15-  · Ampicillin ( -    Subjective:   Date of Consultation:  2019  Referring Physician: Juanjose Graff     Fevers improved; weak but trying to eat; No Known Allergies     Review of Systems:  A comprehensive review of systems was negative except for that written in the History of Present Illness. Objective:     Visit Vitals  /68 (BP 1 Location: Right arm, BP Patient Position: At rest)   Pulse (!) 105   Temp 98.1 °F (36.7 °C)   Resp 18   Ht 5' 8.5\" (1.74 m)   Wt 59.5 kg (131 lb 2.8 oz)   SpO2 98%   BMI 19.65 kg/m²     Temp (24hrs), Av.2 °F (36.8 °C), Min:97.4 °F (36.3 °C), Max:99.1 °F (37.3 °C)       Lines:  Peripheral IV:       Physical Exam:    General:  Alert, cooperative, in no distress, chronically ill-appearing   Eyes:  Sclera anicteric   Mouth/Throat: Mucous membranes dry;   Neck: Supple   Lungs:   Clear to auscultation bilaterally, good effort   CV:  Tachy, regular, no murmur   Abdomen:   Midline lower abdominal wound bandaged with minimal drainage; LLQ ostomy intact; Extremities: No cyanosis or edema   Skin: no acute rash   Musculoskeletal: No swelling or deformity   Lines/Devices:  PIV   Psych: Alert and responsive       Data Review:     CBC:  No results for input(s): WBC, GRANS, MONOS, EOS, ANEU, ABL, HGB, HCT, PLT, HGBEXT, HCTEXT, PLTEXT, HGBEXT, HCTEXT, PLTEXT in the last 72 hours.     No lab exists for component: LYMPHS,  ELIAN    BMP:  Recent Labs     06/17/19  0553 06/15/19  0612   CREA 0.87 0.92   BUN 29* 15    139   K 3.7 3.2*   * 110*   CO2 23 21   AGAP 7 8   * 168*       LFTS:  Recent Labs     06/15/19  0612   TBILI 2.7*   *   SGOT 84*   *   TP 5.5*   ALB 1.9*       Microbiology:     All Micro Results     Procedure Component Value Units Date/Time    CULTURE, URINE [[id number]]  (Abnormal)  (Susceptibility) Collected:  06/13/19 1828    Order Status:  Completed Specimen:  Urine from [facility] Specimen Updated:  06/17/19 0747     Special Requests: NO SPECIAL REQUESTS        Culture result:       >100,000 COLONIES/mL ENTEROCOCCUS FAECALIS GROUP D          CULTURE, BLOOD [[id number]] Collected:  06/17/19 0553    Order Status:  Completed Specimen:  Blood Updated:  06/17/19 0619    CULTURE, BLOOD [[id number]]  (Abnormal) Collected:  06/13/19 1600    Order Status:  Completed Specimen:  Blood Updated:  06/16/19 0704     Special Requests: --        LEFT  Antecubital       GRAM STAIN YEAST         AEROBIC BOTTLE POSITIVE               CRITICAL RESULT NOT CALLED DUE TO PREVIOUS NOTIFICATION OF CRITICAL RESULT WITHIN THE LAST 24 HOURS.            Culture result: YEAST         REFER TO ACC [id number] FOR ID    CULTURE, BLOOD [[id number]]  (Abnormal) Collected:  06/13/19 1600    Order Status:  Completed Specimen:  Blood Updated:  06/16/19 0703     Special Requests: --        RIGHT  FOREARM       GRAM STAIN YEAST         AEROBIC BOTTLE POSITIVE               RESULTS VERIFIED, PHONED TO AND READ BACK BY MARIA DEL ROSARIO COTA 3RD FLOOR AT 4812 ON 6/15/19 [facility]           Culture result:       JHOANA ALBICANS DETECTED Test Performed by Multiplex PCR                  RESULTS VERIFIED, PHONED TO AND READ BACK BY  Josse Young RN 5503 6.15.19       CULTURE, BLOOD [[id number]] Collected:  06/13/19 1626    Order Status:  Completed Specimen:  Blood Updated:  06/15/19 1301     Special Requests: C/ Sunshine 29 AEROBIC BOTTLE POSITIVE               RESULTS VERIFIED, PHONED TO AND READ BACK BY Nora Mckeon TO URSULA ON [email or web address]           Culture result: YEAST               Specimen sent to [facility] for testing  6.15.19        SEE REPORT FROM Aurelia Clay  [id number]             Imaging:   No new imaging    Signed By: Guero Jhaveri MD     June 17, 2019

## 2019-06-17 NOTE — PROGRESS NOTES
Shift assessment complete. Pt resting quietly in bed. No signs of acute distress. Resp even and unlabored. IV abx infusing. Ostomy in place with small output. Pt requesting brief change, changed brief. Call light within reach. Pt instructed to call for assistance.

## 2019-06-17 NOTE — DISCHARGE SUMMARY
Physician Discharge Summary     Patient ID:  Kallie Lot  [id number]  69 y.o.  1945    Admit date: 6/13/2019    Discharge date: 6-    Diagnosis:  1- Acute complicated recurrent UTI in male, urine CS grew Enterococcus repeatedly. Treated with Zosyn iv then Ampicillin po. Hx of BPH.  2- Yeast blood Infection, seen by ID. Treated with IV then po antifungal on discharge. PICC line removed. Seen by infectious disease. Seen by Ophthalmology and found to have no evidence of ophthalmic canndidiasis. 3- Rectal cancer and fistula, s/p resection and colostomy placement To follow with surgery. 4- Nutrition: no TPN. As tolerated. Dietitian recommendations. 5- Right eye blood vessel narrowing and mild intraretinal bleed, mild per ophthalmology with no further recommendations. 6- Hypokalemia, replaced. 7- Anticoagulated, hx of DVT. 8- Septic shock dt #1, resolved. Sepsis present on admission w/ urinary Enterococcus and blood yeast.  9- Severe malnutrition    Hospital course:   73 y/o male with hx rectal cancer, s/p resection, colostomy, SBO, hypothyroidism who presents to ED from SNF with fever and chills. Tmax in  with tachycardia. Lactic acid normal at 1.23. Patient could not provide urine speciment but was cathed and had 1 liter retained urine. UA shows small leukocyte esterase and 4+ bacteria. He had received 1 dose of IV zosyn by EMS. No N/V or abdominal or flank pain. He receives supplemental nutrition with TPN and lipids. Will admit for further treatment. Patient admitted and treated as above. On day of discharge, patient exam showed clear lungs and benign abdomen. PCP: Katelyn Zepeda,     Patient Instructions:   Current Discharge Medication List      START taking these medications    Details   ampicillin (PRINCIPEN) 500 mg capsule Take 1 Cap by mouth every eight (8) hours.   Qty: 15 Cap, Refills: 0         CONTINUE these medications which have CHANGED    Details   clonazePAM (KLONOPIN) 2 mg tablet Take 2 Tabs by mouth nightly. Max Daily Amount: 4 mg. Indications: takes for sleep  Qty: 15 Tab, Refills: 0    Associated Diagnoses: Primary insomnia      amitriptyline (ELAVIL) 100 mg tablet    Potassium chloride 20 mEq daily for 10 days    Diflucan 200 mg po daily for 10 days Take 1 Tab by mouth nightly. Dr Jayna Arreola psych  Indications: takes for sleep  Qty: 15 Tab, Refills: 0         CONTINUE these medications which have NOT CHANGED    Details          rivaroxaban (XARELTO) 20 mg tab tablet Take 1 Tab by mouth daily (with dinner). Qty: 30 Tab, Refills: 1      tamsulosin (FLOMAX) 0.4 mg capsule Take 0.4 mg by mouth daily. OLANZapine (ZYPREXA) 2.5 mg tablet Take 7.5 mg by mouth nightly. Indications: Takes 3 x 2.5 mg tabs      levothyroxine (SYNTHROID) 125 mcg tablet TAKE ONE TABLET BY MOUTH ONE TIME DAILY BEFORE BREAKFAST  Qty: 90 Tab, Refills: 3    Associated Diagnoses: Acquired hypothyroidism      Cholecalciferol, Vitamin D3, (VITAMIN D3) 2,000 unit cap capsule Take 2,000 Units by mouth two (2) times a day. Qty: 180 Cap, Refills: 3         STOP taking these medications       zolpidem (AMBIEN) 5 mg tablet Comments:   Reason for Stopping:               Instructions:     Rehab MD in 1-3 days, diet per dietitian, no TPN, surgery follow up as set up, activity as tolerated, check BMP/ Mg and Phos in 10 days. Condition: Stable  Time 35 min    Please send copy to primary physician.     Signed:  Bowen Monet MD  6/17/2019  9:56 AM

## 2019-06-17 NOTE — PROGRESS NOTES
Care Management Interventions  PCP Verified by CM: Yes  Mode of Transport at Discharge: BLS  Transition of Care Consult (CM Consult): SNF  Partner SNF: Yes  Current Support Network: Own Home  Confirm Follow Up Transport: Family  Plan discussed with Pt/Family/Caregiver: Yes  Freedom of Choice Offered: Yes  Discharge Location  Discharge Placement: Skilled nursing facility        During rounds MD states pt will not longer need TPN at SNF but will likely need IV ABX. MD ordered blood cultures today so will await final results in several days. LEIGH spoke with Lucinda Duarte with [facility] and sent referral via [address] [place] [address].

## 2019-06-17 NOTE — PROGRESS NOTES
Nutrition follow-up:  6/16: Nutrition Consult - ? If pt can stop TPN and manage intake with supplements Cipriano Lopez MD)  6/14: Nutrition Consult for General Nutrition Management:   TPN Management Cipriano Lopez MD)  Assessment:  Food/Nutrition Patient History:   Pt well known to nutritionist, last admission secondary dislodged R IJ CVC. Currently admitted with acute UTI. 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 Past medical history notable for rectal cancer > 5 years, chronic brito, recurrent SBO, abscesses, ileostomy, colostomy in 2014.    Diet:  6/17: Regular  Nutrition Supplement:  Ensure Enlive on all meal trays; Magic cup at lunch and supper. CVC line removed 6/15 r/t infected line. Pt tolerated full liquids 6/16 and 6/17; very weak; has been drinking minimum amounts of Ensure Enlive but has been eating Magic Cup supplement at lunch and supper. Calorie count initiated 6/16-day 1 results recorded under intake / comparative standards. Pertinent Medications:  Ampicillin, Vit D3, Zofran, Zosyn, Mg Ox   Pertinent Labs:  BMP reviewed-unremarkable  IV access: Central Line removed 6/15 secondary yeast  Colostomy: No output recorded for 6/16; 300 ml output on 6/17  Anthropometrics:Height: 5' 8.5\" (174 cm),  Weight: 56.7 kg (125 lb), Weight Source: Standing scale (comment), Body mass index is 18.73 kg/m². BMI class of underweight for age >71.   Weight: 59.5 kg (131 lb 2.8 oz) bed weight 6/15 increase likely a result of different weight source   Meets Criteria for Malnutrition in the context of Chronic Illness   [] Severe Malnutrition, as evidenced by:              [] Severe loss of muscle mass              [] Nutritional intake of <75% of energy intake compared to estimated energy needs for > 1 month              [x] Weight loss of >5% in 1 month, >7.5% in 3 months,  >10% in 6 months, or >20% in 12 months              [] Severe edema              [] Severe loss of subcutaneous fat              [] Functional decline   [] Non-Severe (Moderate) Malnutrition, as evidenced by:              [x] Mild loss of muscle mass              [] Nutritional intake of <75% of energy intake compared to estimated energy needs for > 1 month              [] Weight loss of  5% in 1 month, 7.5% in 3 months, or 10% in 6 months,>20% in 12 months              [] Mild edema              [x] Mild loss of subcutaneous fat                    Macronutrient Needs:  57 kg standing body wt 6/14/19  EER:  1304-6352 kcal /day (30-35 kcal/kg)  EPR:  68-86 grams protein/day (1.2-1.5 grams/kg) (GFR >60ml/min)     Intake/Comparative Standards:   Calorie count results for 6/16:  Pt met ~75% estimated kcal needs and 49% estimated protein needs. Day 2- 6/17:  Results from Breakfast and lunch (680 kcal and 27 gm pro-supper not yet received). Intervention:  Meals and snacks: Continue Regular diet; assisted with supper menu selection. Encouraged po inake. Nutrition Supplement Therapy:  1. Continue Ensure Enlive TID. 2.  Continue Magic Cup BID. Vitamiin and Mineral Supplement Therapy:  1. Nutrition support orders for electrolyte management replacement are active on the [facility]. Coordination of Nutrition Care:  Interdisciplinary Rounds, Dr. Izabella Horowitz, RN. Discharge Nutrition Needs:  Too soon to determine    Margarita Puga, [address] [place] [address], [address] [place] [address], [address] [place] [address]  [phone]

## 2019-06-17 NOTE — PROGRESS NOTES
Hospitalist     Admit Date:  2019  3:47 PM   Name:  Jose Hewitt   Age:  74 y.o.  :  1945   MRN:  [id number]   PCP:  DO andrew Figueroa   Patient is a 73 y/o male with hx rectal cancer, s/p resection, colostomy, SBO, hypothyroidism who presents to ED from SNF with fever and chills. Tmax in  with tachycardia. Lactic acid normal at 1.23. Patient could not provide urine speciment but was cathed and had 1 liter retained urine. UA shows small leukocyte esterase and 4+ bacteria. He had received 1 dose of IV zosyn by EMS. No N/V or abdominal or flank pain. He receives supplemental nutrition with TPN and lipids. Will admit for further treatment. Today, vitals normalizing, feels better, good appetite. No fever    Objective:     Patient Vitals for the past 24 hrs:   Temp Pulse Resp BP SpO2   19 1320 98.7 °F (37.1 °C) 100 18 113/78 91 %   19 0838 98.1 °F (36.7 °C) (!) 105 18 108/68 98 %   19 0354 97.7 °F (36.5 °C) 99 16 113/76 92 %   19 0036 98.8 °F (37.1 °C) (!) 104 16 104/63 95 %   19 1957 99.1 °F (37.3 °C) (!) 107 16 108/65 96 %     Oxygen Therapy  O2 Sat (%): 91 % (19 1320)  Pulse via Oximetry: 99 beats per minute (19 1940)  O2 Device: Room air (06/15/19 0746)    Intake/Output Summary (Last 24 hours) at 2019 1556  Last data filed at 2019 1320  Gross per 24 hour   Intake    Output 600 ml   Net -600 ml       Physical Exam:  General:    Thin, cachectic, hoarse voice. Alert. CV:    RRR  No murmur, rub, or gallop. Lungs:  CTAB. No wheezing, rhonchi, or rales. Abdomen: Soft, nontender, nondistended. Bowel sounds normal.   Colostomy bag in place, brown liquid stool  Extremities: Warm and dry. No cyanosis or edema. Neurologic:  grossly intact. Skin:     No rashes or jaundice. No wounds. Psych:  Normal mood and affect. I reviewed the labs, imaging, EKGs, telemetry, and other studies done this admission.   Data Review: Recent Results (from the past 24 hour(s))   MAGNESIUM    Collection Time: 06/17/19  5:53 AM   Result Value Ref Range    Magnesium 2.0 1.8 - 2.4 mg/dL   METABOLIC PANEL, BASIC    Collection Time: 06/17/19  5:53 AM   Result Value Ref Range    Sodium 143 136 - 145 mmol/L    Potassium 3.7 3.5 - 5.1 mmol/L    Chloride 113 (H) 98 - 107 mmol/L    CO2 23 21 - 32 mmol/L    Anion gap 7 7 - 16 mmol/L    Glucose 101 (H) 65 - 100 mg/dL    BUN 29 (H) 8 - 23 MG/DL    Creatinine 0.87 0.8 - 1.5 MG/DL    GFR est AA >60 >60 ml/min/1.73m2    GFR est non-AA >60 >60 ml/min/1.73m2    Calcium 8.2 (L) 8.3 - 10.4 MG/DL   PHOSPHORUS    Collection Time: 06/17/19  5:53 AM   Result Value Ref Range    Phosphorus 3.2 2.3 - 3.7 MG/DL       Imaging Studies:  CXR Results  (Last 48 hours)    None        CT Results  (Last 48 hours)    None          Assessment and Plan:     Hospital Problems as of 6/17/2019 Date Reviewed: 12/17/2018          Codes Class Noted - Resolved POA    Hypothyroid ICD-10-CM: E03.9  ICD-9-CM: 244.9  6/14/2019 - Present Yes    Overview Signed 6/14/2019 12:12 AM by Akshat Cross MD     stable w/med             Fever ICD-10-CM: R50.9  ICD-9-CM: 780.60  6/13/2019 - Present Yes        * (Principal) Acute UTI (urinary tract infection) ICD-10-CM: N39.0  ICD-9-CM: 599.0  6/13/2019 - Present Yes        Anemia ICD-10-CM: D64.9  ICD-9-CM: 285.9  6/13/2019 - Present Yes        Elevated LFTs ICD-10-CM: R94.5  ICD-9-CM: 790.6  6/13/2019 - Present Yes        Malnutrition ([address] ICD-10-CM: E46  ICD-9-CM: 263.9  5/19/2019 - Present Yes        On total parenteral nutrition (TPN) ICD-10-CM: Z78.9  ICD-9-CM: V49.89  5/19/2019 - Present Yes        Colostomy care [facility]) ICD-10-CM: Z43.3  ICD-9-CM: V55.3  5/19/2019 - Present Yes              PLAN:    Continue anti-microbials per ID  Follow cultures  PO diet  K replacement    Dispo: back to [facility] in 2 days if blood CS negative    Signed:  Bolivar Marshall MD

## 2019-06-18 LAB
RESULT 1, 080227: NORMAL
SPECIMEN SOURCE: NORMAL
YEAST SPEC QL CULT: NORMAL

## 2019-06-18 PROCEDURE — 36415 COLL VENOUS BLD VENIPUNCTURE: CPT

## 2019-06-18 PROCEDURE — 74011250637 HC RX REV CODE- 250/637: Performed by: HOSPITALIST

## 2019-06-18 PROCEDURE — 74011250636 HC RX REV CODE- 250/636: Performed by: INTERNAL MEDICINE

## 2019-06-18 PROCEDURE — 87389 HIV-1 AG W/HIV-1&-2 AB AG IA: CPT

## 2019-06-18 PROCEDURE — 65270000029 HC RM PRIVATE

## 2019-06-18 PROCEDURE — 74011000258 HC RX REV CODE- 258: Performed by: INTERNAL MEDICINE

## 2019-06-18 RX ADMIN — Medication 5 ML: at 13:12

## 2019-06-18 RX ADMIN — AMPICILLIN 500 MG: 500 CAPSULE ORAL at 21:52

## 2019-06-18 RX ADMIN — Medication 10 ML: at 06:11

## 2019-06-18 RX ADMIN — VITAMIN D, TAB 1000IU (100/BT) 2000 UNITS: 25 TAB at 21:52

## 2019-06-18 RX ADMIN — AMITRIPTYLINE HYDROCHLORIDE 100 MG: 50 TABLET, FILM COATED ORAL at 21:52

## 2019-06-18 RX ADMIN — VITAMIN D, TAB 1000IU (100/BT) 2000 UNITS: 25 TAB at 08:36

## 2019-06-18 RX ADMIN — CLONAZEPAM 4 MG: 1 TABLET ORAL at 21:51

## 2019-06-18 RX ADMIN — Medication 400 MG: at 08:36

## 2019-06-18 RX ADMIN — LEVOTHYROXINE SODIUM 125 MCG: 125 TABLET ORAL at 05:50

## 2019-06-18 RX ADMIN — TAMSULOSIN HYDROCHLORIDE 0.4 MG: 0.4 CAPSULE ORAL at 08:36

## 2019-06-18 RX ADMIN — ZOLPIDEM TARTRATE 5 MG: 5 TABLET ORAL at 21:55

## 2019-06-18 RX ADMIN — OLANZAPINE 7.5 MG: 2.5 TABLET, FILM COATED ORAL at 21:52

## 2019-06-18 RX ADMIN — SODIUM CHLORIDE 100 MG: 9 INJECTION, SOLUTION INTRAVENOUS at 16:59

## 2019-06-18 RX ADMIN — AMPICILLIN 500 MG: 500 CAPSULE ORAL at 05:50

## 2019-06-18 RX ADMIN — RIVAROXABAN 20 MG: 20 TABLET, FILM COATED ORAL at 08:36

## 2019-06-18 RX ADMIN — AMPICILLIN 500 MG: 500 CAPSULE ORAL at 13:10

## 2019-06-18 NOTE — PROGRESS NOTES
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 If UTI vs colonization  · Urinary retention   · SBO S/p ANTHONY, [birth year], complicated by delayed ileus, EC fistula,     Plan:   · Continue anidulafungin, if f/u BC's neg and eye exam neg, could change to po fluconazole to complete treatment as it appears that he is taking po well, fistula is closed and TPN has been discontinued. · Await Ophthalmology exam  · Monitor  f/up BCX  · Complete 5 d ampicillin    Anti-infectives:   · Eraxis (6/15-  · Ampicillin ( -    Subjective:   Date of Consultation:  2019  Referring Physician: Omer Zapata     Eating breakfast, no distress    No Known Allergies     Review of Systems:  A comprehensive review of systems was negative except for that written in the History of Present Illness.     Objective:     Visit Vitals  /69 (BP 1 Location: Right arm, BP Patient Position: At rest)   Pulse (!) 101   Temp 99.7 °F (37.6 °C)   Resp 28   Ht 5' 8.5\" (1.74 m)   Wt 59.5 kg (131 lb 2.8 oz)   SpO2 94%   BMI 19.65 kg/m²     Temp (24hrs), Av.2 °F (37.3 °C), Min:98.7 °F (37.1 °C), Max:99.7 °F (37.6 °C)       Lines:  Peripheral IV:       Physical Exam:    General:  Alert, cooperative, in no distress, chronically ill-appearing   Eyes:  Sclera anicteric   Mouth/Throat: Mucous membranes dry;   Neck: Supple   Lungs:   Clear to auscultation bilaterally, good effort   CV:  Tachy, regular, no murmur   Abdomen:   Nondistended   Extremities: No cyanosis or edema   Skin: no acute rash   Musculoskeletal: No swelling or deformity   Lines/Devices:  PIV   Psych: Alert and responsive       Data Review:     CBC:  No results for input(s): WBC, GRANS, MONOS, EOS, ANEU, ABL, HGB, HCT, PLT, HGBEXT, HCTEXT, PLTEXT, HGBEXT, HCTEXT, PLTEXT in the last 72 hours. No lab exists for component: LYMPHS,  ELIAN    BMP:  Recent Labs     06/17/19 0553   CREA 0.87   BUN 29*      K 3.7   *   CO2 23   AGAP 7   *       LFTS:  No results for input(s): TBILI, ALT, SGOT, AP, TP, ALB in the last 72 hours. Microbiology:     All Micro Results     Procedure Component Value Units Date/Time    CULTURE, URINE [[id number]]  (Abnormal)  (Susceptibility) Collected:  06/13/19 1828    Order Status:  Completed Specimen:  Urine from [facility] Specimen Updated:  06/17/19 0747     Special Requests: NO SPECIAL REQUESTS        Culture result:       >100,000 COLONIES/mL ENTEROCOCCUS FAECALIS GROUP D          CULTURE, BLOOD [[id number]] Collected:  06/17/19 0553    Order Status:  Completed Specimen:  Blood Updated:  06/17/19 0619    CULTURE, BLOOD [[id number]]  (Abnormal) Collected:  06/13/19 1600    Order Status:  Completed Specimen:  Blood Updated:  06/16/19 0704     Special Requests: --        LEFT  Antecubital       GRAM STAIN YEAST         AEROBIC BOTTLE POSITIVE               CRITICAL RESULT NOT CALLED DUE TO PREVIOUS NOTIFICATION OF CRITICAL RESULT WITHIN THE LAST 24 HOURS.            Culture result: YEAST         REFER TO ACC [id number] FOR ID    CULTURE, BLOOD [[id number]]  (Abnormal) Collected:  06/13/19 1600    Order Status:  Completed Specimen:  Blood Updated:  06/16/19 0703     Special Requests: --        RIGHT  FOREARM       GRAM STAIN YEAST         AEROBIC BOTTLE POSITIVE               RESULTS VERIFIED, PHONED TO AND READ BACK BY MARIA DEL ROSARIO COTA 3RD FLOOR AT 6268 ON 6/15/19 [facility]           Culture result:       JHOANA ALBICANS DETECTED Test Performed by Multiplex PCR                  RESULTS VERIFIED, PHONED TO AND READ BACK BY  Mari Gomez RN [id number] 6.15.19       CULTURE, BLOOD [[id number]] Collected:  06/13/19 1626    Order Status:  Completed Specimen:  Blood Updated:  06/15/19 1301     Special Requests: CENTRAL        GRAM STAIN YEAST         AEROBIC BOTTLE POSITIVE               RESULTS VERIFIED, PHONED TO AND READ BACK BY Benito Jha TO URSULA ON Lauren@[employer]           Culture result: YEAST               Specimen sent to [facility] for testing  6.15.19        SEE REPORT FROM [facility]  [id number]             Imaging:   No new imaging    Signed By: Luz Marina Zambrano MD     June 18, 2019

## 2019-06-18 NOTE — PROGRESS NOTES
Nutrition follow-up:  6/16: Nutrition Consult - ? If pt can stop TPN and manage intake with supplements Savlador Boo MD)  6/14: Nutrition Consult for General Nutrition Management:   TPN Management Salvador Boo MD)  Assessment:  Food/Nutrition Patient History:   Pt well known to nutritionist, last admission secondary dislodged R IJ CVC. Currently admitted with acute UTI; had been chronic TPN secondary to EC fistula at time of this admission. Past medical history notable for rectal cancer > 5 years, chronic brito, recurrent SBO, abscesses, ileostomy, colostomy in 2014.    Diet:  6/17: Regular  Nutrition Supplement:  Ensure Enlive on all meal trays; Magic cup at lunch and supper. CVC line removed 6/15 r/t infected line. Pt tolerating diet advancement to Regular; small amounts at meals. Pt ate 1 slice of [place]ner[place] Toast, few bites of egg and 100% Ensure Enlive this am.  Calorie count initiated 6/16-day 1 and 2 results recorded under intake / comparative standards. Pertinent Medications:  Ampicillin, Vit D3, Zofran, Mg Ox   Pertinent Labs:  No BMP results today. Colostomy: 550 ml output recorded for 6/17   Anthropometrics:Height: 5' 8.5\" (174 cm),  Weight: 56.7 kg (125 lb), Weight Source: Standing scale (comment), Body mass index is 18.73 kg/m². BMI class of underweight for age >71.   Weight: 59.5 kg (131 lb 2.8 oz) bed weight 6/15 increase likely a result of different weight source   Meets Criteria for Malnutrition in the context of Chronic Illness   [] Severe Malnutrition, as evidenced by:              [] Severe loss of muscle mass              [] Nutritional intake of <75% of energy intake compared to estimated energy needs for > 1 month              [x] Weight loss of >5% in 1 month, >7.5% in 3 months,  >10% in 6 months, or >20% in 12 months              [] Severe edema              [] Severe loss of subcutaneous fat              [] Functional decline   [] Non-Severe (Moderate) Malnutrition, as evidenced by:              [x] Mild loss of muscle mass              [] Nutritional intake of <75% of energy intake compared to estimated energy needs for > 1 month              [] Weight loss of  5% in 1 month, 7.5% in 3 months, or 10% in 6 months,>20% in 12 months              [] Mild edema              [x] Mild loss of subcutaneous fat                    Macronutrient Needs:  57 kg standing body wt 6/14/19  EER:  2419-5714 kcal /day (30-35 kcal/kg)  EPR:  68-86 grams protein/day (1.2-1.5 grams/kg) (GFR >60ml/min)     Intake/Comparative Standards:   Calorie count results for Day 1 6/16:  Pt met ~75% estimated kcal needs and 49% estimated protein needs. Day 2 (6/17) calorie count results:  Pt met ~ 58% estimated kcal needs and 65% estimated protein needs. Day 3:  Breakfast only-pt consumed ~ 505 kcal and 29 gm protein. Intervention:  Meals and snacks: Continue Regular diet; assisted with lunch menu selection. Encouraged po inake. Nutrition Supplement Therapy:  1. Continue Ensure Enlive TID. 2.  Continue Magic Cup BID. Vitamiin and Mineral Supplement Therapy:  1. Nutrition support orders for electrolyte management replacement are active on the [facility] Coordination of Nutrition Care:  Interdisciplinary Rounds  Discharge Nutrition Needs: Regular diet; encourage nutrition supplements (Ensue Enlive / Magic Cup to supplement po intake at meals.     Sai Gunter, MPH, [address] [place] [address],   [phone]

## 2019-06-18 NOTE — CDMP QUERY
Pt admitted with acute urinary tract infection and has malnutrition documented. Please further specify type of malnutrition. ? Malnutrition (mild, moderate, severe) ? Protein calorie malnutrition (mild, moderate, severe) ? Other (please specify) ? Unable to determine The medical record reflects the following: 
  Risk Factors: age, currently admitted with a UTI, chronic TPN use, rectal cancer Clinical Indicators:  
Meets Criteria for Malnutrition in the context of Chronic Illness  
[] Severe Malnutrition, as evidenced by: 
            [] Severe loss of muscle mass 
            [] Nutritional intake of <75% of energy intake compared to estimated energy needs for > 1 month             [x] Weight loss of >5% in 1 month, >7.5% in 3 months,  >10% in 6 months, or >20% in 12 months 
            [] Severe edema 
            [] Severe loss of subcutaneous fat 
            [] Functional decline  
[] Non-Severe (Moderate) Malnutrition, as evidenced by: 
            [x] Mild loss of muscle mass 
            [] Nutritional intake of <75% of energy intake compared to estimated energy needs for > 1 month             [] Weight loss of  5% in 1 month, 7.5% in 3 months, or 10% in 6 months,>20% in 12 months 
            [] Mild edema 
            [x] Mild loss of subcutaneous fat 
   
 
  Treatment: RD consult, Ensure TID and Magic cup BID Thanks, 
ROSY HawkinsN, RN, CDS Compliant Documentation Management Program 
[phone]

## 2019-06-18 NOTE — PROGRESS NOTES
Hospitalist     Admit Date:  2019  3:47 PM   Name:  Lavelle Lawson   Age:  74 y.o.  :  1945   MRN:  [id number]   PCP:  DO andrew Arteaga   Patient is a 73 y/o male with hx rectal cancer, s/p resection, colostomy, SBO, hypothyroidism who presents to ED from SNF with fever and chills. Tmax in  with tachycardia. Lactic acid normal at 1.23. Patient could not provide urine speciment but was cathed and had 1 liter retained urine. UA shows small leukocyte esterase and 4+ bacteria. He had received 1 dose of IV zosyn by EMS. No N/V or abdominal or flank pain. He receives supplemental nutrition with TPN and lipids. Will admit for further treatment. Today, vitals normalizing, feels better, good appetite. No fever    Objective:     Patient Vitals for the past 24 hrs:   Temp Pulse Resp BP SpO2   19 1620 98.6 °F (37 °C) 99 16 124/68 94 %   19 1249 98.6 °F (37 °C) 93 22 123/67 99 %   19 0824 97.7 °F (36.5 °C) 91 22 151/82 96 %   19 0415 99.7 °F (37.6 °C) (!) 101 28 123/69 94 %   19 0024 98.8 °F (37.1 °C) 97 13 124/67 95 %   19 1948 99.4 °F (37.4 °C) (!) 107 12 123/81 91 %     Oxygen Therapy  O2 Sat (%): 94 % (19 1620)  Pulse via Oximetry: 99 beats per minute (19 1940)  O2 Device: Room air (19 0722)    Intake/Output Summary (Last 24 hours) at 2019 1654  Last data filed at 2019 1900  Gross per 24 hour   Intake 150 ml   Output    Net 150 ml       Physical Exam:  General:    Thin, cachectic, hoarse voice. Alert. CV:    RRR  No murmur, rub, or gallop. Lungs:  CTAB. No wheezing, rhonchi, or rales. Abdomen: Soft, nontender, nondistended. Bowel sounds normal.   Colostomy bag in place, brown liquid stool  Extremities: Warm and dry. No cyanosis or edema. Neurologic:  grossly intact. Skin:     No rashes or jaundice. No wounds. Psych:  Normal mood and affect.     I reviewed the labs, imaging, EKGs, telemetry, and other studies done this admission. Data Review:   No results found for this or any previous visit (from the past 24 hour(s)).     Imaging Studies:  CXR Results  (Last 48 hours)    None        CT Results  (Last 48 hours)    None          Assessment and Plan:     Hospital Problems as of 6/18/2019 Date Reviewed: 12/17/2018          Codes Class Noted - Resolved POA    Hypothyroid ICD-10-CM: E03.9  ICD-9-CM: 244.9  6/14/2019 - Present Yes    Overview Signed 6/14/2019 12:12 AM by Keshawn Cummins MD     stable w/med             Fever ICD-10-CM: R50.9  ICD-9-CM: 780.60  6/13/2019 - Present Yes        * (Principal) Acute UTI (urinary tract infection) ICD-10-CM: N39.0  ICD-9-CM: 599.0  6/13/2019 - Present Yes        Anemia ICD-10-CM: D64.9  ICD-9-CM: 285.9  6/13/2019 - Present Yes        Elevated LFTs ICD-10-CM: R94.5  ICD-9-CM: 790.6  6/13/2019 - Present Yes        Malnutrition ([address] ICD-10-CM: E46  ICD-9-CM: 263.9  5/19/2019 - Present Yes        On total parenteral nutrition (TPN) ICD-10-CM: Z78.9  ICD-9-CM: V49.89  5/19/2019 - Present Yes        Colostomy care [facility]) ICD-10-CM: Z43.3  ICD-9-CM: V55.3  5/19/2019 - Present Yes              PLAN:    Continue anti-microbials per ID  Follow cultures, repeat blood CS still engative  PO diet  K replacement    Dispo: back to [facility] in 2 days if blood CS negative    Signed:  Alisia Isabel MD

## 2019-06-18 NOTE — CDMP QUERY
Pt admitted with acute urinary tract infection./Pt noted to have elevated tempeture, elevated heart rate, UTI and elevated bilirubin greater than 2 in a patient without previous documentation of liver disease. If possible, please document in the progress notes and d/c summary if you are evaluating and / or treating any of the following: 
 
? Sepsis, present on admission, suspected or probable causative organism (please specify) ? Sepsis, now resolved, suspected or probable causative organism (please specify) ? Sepsis, not present on admission, suspected or probable causative organism (please specify) ? No Sepsis, suspected or probable localized infection (please specify) ? Sepsis was ruled out (include corresponding diagnosis for patients clinical picture and treatment) ? Other, please specify ? Clinically unable to determine The medical record reflects the following: 
   Risk Factors: age, current admission for UTI, rectal cancer Clinical Indicators: Bilirubin of 3.2 on 6/13 @ 1600, T-103 orally with HR-121 on vital check @ 6966 on 6/13 and currently admitted with acute urinary tract infection Treatment: Eraxis 100mg IV q24 for blood stream infection started on 6/16, Eraxis 200mg once on 6/15, 1L D5 NS on 6/14, 1L NS on 6/13, 1L NS on 6/16, Diflucan 400mg IV q24 for bloodstream infection ThanksTati BSN, RN, CDS Compliant Documentation Management Program 
[phone]

## 2019-06-18 NOTE — PROGRESS NOTES
Care assumed. Set up for meal; pt seems to be doing better with intake. C/O feeling much weaker though, and says he hasn't been able to walk much. Ostomy is functioning. Pt is incontinent most of the time, occasionally uses urinal.   Antibiotic/antifungal continues.

## 2019-06-18 NOTE — CDMP QUERY
Pt admitted with acute urinary tract infection./Pt noted to have low BP and IV fluid boluses. If possible, please document in the progress notes and d/c summary if you are evaluating and / or treating any of the following: 
 
? Cardiogenic Shock ? Septic Shock ? Hypovolemic Shock ? Other Shock, Please specify ? Other, please specify ? Clinically unable to determine The medical record reflects the following: 
   Risk Factors: age, rectal cancer, currently admitted for acute urinary tract infection Clinical Indicators: per vital signs taken on 6/13 @ 2330 BP is 84/55 with a decrease in baseline SPB > 40, initial BP of 141/77 on 6/13 @ 1553 Treatment: 1L D5 NS on 6/14, 1L NS on 6/13 and 1L NS on 6/16 Thanks, 
ROSY CartwrightN, RN, CDS Compliant Documentation Management Program 
[phone]

## 2019-06-19 VITALS
SYSTOLIC BLOOD PRESSURE: 147 MMHG | OXYGEN SATURATION: 96 % | WEIGHT: 131.17 LBS | DIASTOLIC BLOOD PRESSURE: 84 MMHG | HEIGHT: 69 IN | HEART RATE: 82 BPM | RESPIRATION RATE: 14 BRPM | BODY MASS INDEX: 19.43 KG/M2 | TEMPERATURE: 98.2 F

## 2019-06-19 LAB
ANION GAP SERPL CALC-SCNC: 7 MMOL/L (ref 7–16)
BUN SERPL-MCNC: 22 MG/DL (ref 8–23)
CALCIUM SERPL-MCNC: 8.1 MG/DL (ref 8.3–10.4)
CHLORIDE SERPL-SCNC: 108 MMOL/L (ref 98–107)
CO2 SERPL-SCNC: 28 MMOL/L (ref 21–32)
CREAT SERPL-MCNC: 0.78 MG/DL (ref 0.8–1.5)
GLUCOSE SERPL-MCNC: 101 MG/DL (ref 65–100)
HIV 1+2 AB+HIV1 P24 AG SERPL QL IA: NON REACTIVE
MAGNESIUM SERPL-MCNC: 1.8 MG/DL (ref 1.8–2.4)
PHOSPHATE SERPL-MCNC: 4.5 MG/DL (ref 2.3–3.7)
POTASSIUM SERPL-SCNC: 3 MMOL/L (ref 3.5–5.1)
SODIUM SERPL-SCNC: 143 MMOL/L (ref 136–145)

## 2019-06-19 PROCEDURE — 36415 COLL VENOUS BLD VENIPUNCTURE: CPT

## 2019-06-19 PROCEDURE — 80048 BASIC METABOLIC PNL TOTAL CA: CPT

## 2019-06-19 PROCEDURE — 83735 ASSAY OF MAGNESIUM: CPT

## 2019-06-19 PROCEDURE — 84100 ASSAY OF PHOSPHORUS: CPT

## 2019-06-19 PROCEDURE — 74011250637 HC RX REV CODE- 250/637: Performed by: HOSPITALIST

## 2019-06-19 RX ORDER — FLUCONAZOLE 100 MG/1
200 TABLET ORAL DAILY
Status: DISCONTINUED | OUTPATIENT
Start: 2019-06-19 | End: 2019-06-19 | Stop reason: HOSPADM

## 2019-06-19 RX ORDER — LANOLIN ALCOHOL/MO/W.PET/CERES
400 CREAM (GRAM) TOPICAL DAILY
Qty: 30 TAB | Refills: 0 | Status: SHIPPED | OUTPATIENT
Start: 2019-06-20 | End: 2019-07-31

## 2019-06-19 RX ORDER — AMPICILLIN 500 MG/1
500 CAPSULE ORAL EVERY 8 HOURS
Qty: 15 CAP | Refills: 0 | Status: SHIPPED | OUTPATIENT
Start: 2019-06-19 | End: 2019-07-31 | Stop reason: ALTCHOICE

## 2019-06-19 RX ORDER — POTASSIUM CHLORIDE 20 MEQ/1
40 TABLET, EXTENDED RELEASE ORAL
Status: DISCONTINUED | OUTPATIENT
Start: 2019-06-19 | End: 2019-06-19 | Stop reason: SDUPTHER

## 2019-06-19 RX ORDER — LANOLIN ALCOHOL/MO/W.PET/CERES
400 CREAM (GRAM) TOPICAL DAILY
Status: DISCONTINUED | OUTPATIENT
Start: 2019-06-20 | End: 2019-06-19 | Stop reason: HOSPADM

## 2019-06-19 RX ORDER — POTASSIUM CHLORIDE 20 MEQ/1
20 TABLET, EXTENDED RELEASE ORAL DAILY
Qty: 10 TAB | Refills: 0 | Status: SHIPPED
Start: 2019-06-19 | End: 2019-06-29

## 2019-06-19 RX ORDER — POTASSIUM CHLORIDE 20 MEQ/1
40 TABLET, EXTENDED RELEASE ORAL
Status: COMPLETED | OUTPATIENT
Start: 2019-06-19 | End: 2019-06-19

## 2019-06-19 RX ORDER — FLUCONAZOLE 200 MG/1
200 TABLET ORAL DAILY
Qty: 10 TAB | Refills: 0 | Status: SHIPPED | OUTPATIENT
Start: 2019-06-19 | End: 2019-06-26

## 2019-06-19 RX ADMIN — VITAMIN D, TAB 1000IU (100/BT) 2000 UNITS: 25 TAB at 08:27

## 2019-06-19 RX ADMIN — AMPICILLIN 500 MG: 500 CAPSULE ORAL at 06:42

## 2019-06-19 RX ADMIN — Medication 5 ML: at 06:42

## 2019-06-19 RX ADMIN — Medication 400 MG: at 08:27

## 2019-06-19 RX ADMIN — FLUCONAZOLE 200 MG: 100 TABLET ORAL at 11:02

## 2019-06-19 RX ADMIN — RIVAROXABAN 20 MG: 20 TABLET, FILM COATED ORAL at 08:28

## 2019-06-19 RX ADMIN — TAMSULOSIN HYDROCHLORIDE 0.4 MG: 0.4 CAPSULE ORAL at 08:27

## 2019-06-19 RX ADMIN — LEVOTHYROXINE SODIUM 125 MCG: 125 TABLET ORAL at 06:42

## 2019-06-19 RX ADMIN — POTASSIUM CHLORIDE 40 MEQ: 20 TABLET, EXTENDED RELEASE ORAL at 11:48

## 2019-06-19 NOTE — PROGRESS NOTES
Care Management Interventions  PCP Verified by CM: Yes  Mode of Transport at Discharge: BLS  Transition of Care Consult (CM Consult): SNF  Partner SNF: Yes  Physical Therapy Consult: Yes  Current Support Network: Own Home  Confirm Follow Up Transport: Friends  Plan discussed with Pt/Family/Caregiver: Yes  Freedom of Choice Offered: Yes  Discharge Location  Discharge Placement: Skilled nursing facility([place])    LEIGH met with pt to discuss dc back to [facility] today. Pt will need ambulance transport. LEIGH spoke with Ariana Prado from admissions with  [facility] [employer] who states pt will go to [facility] bed #4. Naia Pinta transport set for 12 noon. LEIGH spoke with pt's friend and she is aware of p/u time and dc today. No additional needs or questions identified at this time.    Miles Huerta

## 2019-06-19 NOTE — PROGRESS NOTES
Nutrition follow-up:  6/16: Nutrition Consult - ? If pt can stop TPN and manage intake with supplements Meme Baker MD)  6/14: Nutrition Consult for General Nutrition Management:   TPN Management Meme Baker MD)  Assessment:  Food/Nutrition Patient History:   Pt well known to nutritionist, last admission secondary dislodged R IJ CVC. Currently admitted with acute UTI; had been chronic TPN secondary to EC fistula at time of this admission. Past medical history notable for rectal cancer > 5 years, chronic brito, recurrent SBO, abscesses, ileostomy, colostomy in 2014.    Diet:  6/17: Regular  Nutrition Supplement:  Ensure Enlive on all meal trays; Magic cup at lunch and supper. CVC line removed 6/15 r/t infected line. Pt tolerating diet advancement to Regular; small amounts at meals. Pt declined breakfast this am; states he ate small amounts yesterday. Calorie count results for 2 days seen under Intake/Comparative Standards. Pt scheduled [facility]. Pertinent Medications:  Ampicillin, Vit D3, Zofran, Mg Ox   Pertinent Labs:  Hypokalemia, Hyperphosphatemia   Colostomy: 425 ml output recorded for 6/18   Anthropometrics:Height: 5' 8.5\" (174 cm),  Weight: 56.7 kg (125 lb), Weight Source: Standing scale (comment), Body mass index is 18.73 kg/m². BMI class of underweight for age >71.   Weight: 59.5 kg (131 lb 2.8 oz) bed weight 6/15 increase likely a result of different weight source   Meets Criteria for Malnutrition in the context of Chronic Illness   [] Severe Malnutrition, as evidenced by:              [] Severe loss of muscle mass              [] Nutritional intake of <75% of energy intake compared to estimated energy needs for > 1 month              [x] Weight loss of >5% in 1 month, >7.5% in 3 months,  >10% in 6 months, or >20% in 12 months              [] Severe edema              [] Severe loss of subcutaneous fat              [] Functional decline   [] Non-Severe (Moderate) Malnutrition, as evidenced by:              [x] Mild loss of muscle mass              [] Nutritional intake of <75% of energy intake compared to estimated energy needs for > 1 month              [] Weight loss of  5% in 1 month, 7.5% in 3 months, or 10% in 6 months,>20% in 12 months              [] Mild edema              [x] Mild loss of subcutaneous fat                    Macronutrient Needs:  57 kg standing body wt 6/14/19  EER:  4881-5490 kcal /day (30-35 kcal/kg)  EPR:  68-86 grams protein/day (1.2-1.5 grams/kg) (GFR >60ml/min)     Intake/Comparative Standards:   Calorie count results for Day 1 6/16:  Pt met ~75% estimated kcal needs and 49% estimated protein needs. Day 2 (6/17) calorie count results:  Pt met ~ 58% estimated kcal needs and 65% estimated protein needs. Day 3:  Breakfast only-pt consumed ~ 505 kcal and 29 gm protein; no recorded intake at lunch and supper; pt verbalizes small amounts. Intervention:  Meals and snacks: Continue Regular diet; assisted with lunch menu selection. Encouraged po inake. Pt may benefit from 6 small meals/day. Nutrition Supplement Therapy:  1. Continue Ensure Enlive TID. 2.  Continue Magic Cup BID. Vitamiin and Mineral Supplement Therapy:  1. Nutrition support orders for electrolyte management replacement are active on the [facility]. Coordination of Nutrition Care:  Interdisciplinary Rounds, Ronnell Jackson RN  Discharge Nutrition Needs: Regular diet; encourage nutrition supplements (Ensue Enlive / Magic Cup to supplement po intake at all meals).     Vicki Pradhan, MPH, [address] [place] [address],   [phone]

## 2019-06-19 NOTE — PROGRESS NOTES
Shift assessment completed via doc flow sheet. Pt is alert and oriented x4. Denies pain or acute distress. Ostomy intact to left lower quadrant. All safety measures in place. Bed in low and locked position. Pt is instructed to call for assistance.

## 2019-06-19 NOTE — PROGRESS NOTES
Pt in bed with wet brief, with the assistance of Anita PCT pts bed was changed. Colostomy care provided. Pt alert and oriented. Pt denies needs. RR even, unlabored, no noted distress. Bed L/L, call bell is within reach and side rails are up x 2. Bed alarm on for safety.

## 2019-06-19 NOTE — PROGRESS NOTES
Shift assessment complete. Pt resting quietly in bed. No signs of acute distress. Resp even and unlabored. Ostomy in place and draining. Call light within reach. Pt instructed to call for assistance.

## 2019-06-19 NOTE — PROGRESS NOTES
Pt in bed resting with eyes closed, bedside report received from [facility]. NO noted distress.

## 2019-06-19 NOTE — PROGRESS NOTES
Called Dr. Zain Mojica and notified him of pts coughing while taking medications, new order received, orders read back and confirmed.

## 2019-06-19 NOTE — PROGRESS NOTES
While giving pts medications it was noted that pt was coughing upon swallowing water and pills.  Will notify MD.

## 2019-06-20 ENCOUNTER — PATIENT OUTREACH (OUTPATIENT)
Dept: CASE MANAGEMENT | Age: 74
End: 2019-06-20

## 2019-06-20 LAB
BACTERIA SPEC CULT: ABNORMAL
GRAM STN SPEC: ABNORMAL
SERVICE CMNT-IMP: ABNORMAL

## 2019-06-20 NOTE — PROGRESS NOTES
This note will not be viewable in [address] Patient discharged to a SNF Preferred Provider [facility]). Patient will be included in weekly care coordination calls. Message sent to Ermelinda Blizzard, RN SNF Preferred Provider [id number]

## 2019-06-20 NOTE — CONSULTS
Edgard [facility]anb[facility] CONSULTATION Name:  Nayana Herrmann 
MR#:  [id number] :  1945 ACCOUNT #:  [de-identified] DATE OF SERVICE:  2019 HISTORY OF PRESENT ILLNESS:  The patient was seen by me on 2019, in consultation due to complaints of blurry vision. He is an end-stage survivor of colon cancer and was admitted to the hospital for a fungal candidemia. He is currently on hospice and I was consulted to do an exam due to his complaints of blurry vision. When I presented to his room, he was lying in bed, in no apparent distress, frail, unable to sit himself up in the bed to assist me with the exam and was very difficult to understand due to his speech. From what I can understand from speaking with him, he does not feel that he has any blurry vision and felt that it was blown out of proportion. I told him that it was probably a good idea for me to do an exam to make sure that he did not have any issues with his retinal exam. 
 
PHYSICAL EXAMINATION:  On examination, I used a near card to assess his vision. He was 20/100 with the near card in each eye individually without glasses. He says he only wears reading glasses since having his cataracts removed a few years ago. His pupils are round and equal being 3 mm and constricting to 2 mm in both eyes with light. They were equal, round, reactive to light and accommodation with no apparent APD. Extraocular motility was intact. Confrontation visual fields were full. Bedside examination of the anterior angle showed normal appearing cornea with no stain. There was a flat iris and he had notably posterior chamber intraocular lenses in both eyes. Dilated fundus exam was carried out after dilating him with Mydriacyl 0.5% and he had a cup-to-disc ratio of 0.2.   Macula appeared normal.  The arteries were attenuated and there notably in the right eye were two intraretinal hemorrhages on the superior arcade. The retinal periphery was normal in the left eye. IMPRESSION: 
1. Intraretinal hemorrhages, right eye. 2.  Arteriolar attenuation. 3.  Posterior chamber intraocular lenses in both eyes. PLAN:  I spoke with the hospitalist on 06/19, conveying my findings. I do not believe that he has any signs of candidal infection in the retina. The findings of intraretinal hemorrhage in his right eye associated with arteriolar attenuation could be just related to his poor health and anemia or could be related to high blood pressure and/or diabetes. According to his medical record, he does not have any of those diseases and my conclusion would be that he most likely is just very ill and these hemorrhages in his retina will resolve with time. They are of no visual consequence as they are not near the seeing part of the eye. His examination was very difficult to perform due to his positioning in the bed and his inability to really truly cooperate with the exam.  He is a very kind gentleman, and thank you for the consultation and you may contact me, if I may provide you with any further information. Eula Corbett MD 
 
 
[id number] 
D:  06/19/2019 17:01 
T:  06/19/2019 18:44 JOB #:  [id number]

## 2019-06-20 NOTE — PROGRESS NOTES
Patient previously identified as High Risk for Readmission  Fourth admission since 3/22/2019  Had been in SNF for STR and was admitted from SNF at this admission. Admitted 6/13 through 6/19 for Acute [recurrent] UTI - discharged now to [facility] for [employer]. PICC line removed, was followed by Infectious Disease, history of DVT,  Was receiving TPN at admission, now dc'd. Plan - follow through SNF Coordinator  Support through transitions of care at [place] from [employer].

## 2019-06-22 LAB
BACTERIA SPEC CULT: NORMAL
SERVICE CMNT-IMP: NORMAL

## 2019-06-25 ENCOUNTER — PATIENT OUTREACH (OUTPATIENT)
Dept: CASE MANAGEMENT | Age: 74
End: 2019-06-25

## 2019-07-09 ENCOUNTER — PATIENT OUTREACH (OUTPATIENT)
Dept: CASE MANAGEMENT | Age: 74
End: 2019-07-09

## 2019-07-09 NOTE — PROGRESS NOTES
Community Care Team documentation for patient in [facility]    The information below provided by:[facility] 7/3/19    PT Update: Standing Balance @ P+ Dynamic: Unable to accept challenge but can move head without loss of balance for 2  minutes. Bed Mobility @ CGA; Ambulation @175 feet on level surfaces and requiring CGA with FWW  for safety. Transfer @ SBA;  Housekeeping @ Light utilizing FWW requiring MAx A; LB Dressing @ using FWW with F- dynamic (able to maintain static balance with UE support, min assist to reach ipsilateral side and unable to weight shift) balance requiring Min A while sitting then standing. Strength Shoulder @  4-/5 good minus (full ROM against gravity and minimal resistance) influenced by fatigue. Transfer Toilet @ CGA; Ability to Swallow Liquids @ The patient safely swallows upgraded thin liquid using compensatory strategies from trained staff or caregivers given min verbal instruction/cues. Mastication @ regular diet, cut meats with Mod I and given min-no verbal instruction/cues.  Pharyngeal - The patient performs pharyngeal/laryngeal strength exercises given min-no verbal instruction/cues to improve swallowing safety/efficiency with Min A        Nursing Update:stable      Discharge Date:TBD      Assign to [facility] Manager:

## 2019-07-15 ENCOUNTER — PATIENT OUTREACH (OUTPATIENT)
Dept: CASE MANAGEMENT | Age: 74
End: 2019-07-15

## 2019-07-15 NOTE — PROGRESS NOTES
Community Care Team documentation for patient in [facility]    The information below provided by:[facility]    PT Update: Standing Balance @ F- Dynamic: (Accepts minimal challenge): unable to maintain balance while turning head/ trunk for 3  minutes. Bed Mobility @ SBA; Ambulation @ 335ft on level surfaces and requiring CGA/SBA with FWW  for safety. Transfer @ SBA;  [email or web address]@[employer] FWW requiring Mod A; LB Dressing F dynamic (able to maintain balance without balance loss or UE suport and min weight shift ipsilaterally/front, difficulty crossing midline without balance loss) balance requiring Sup while sitting. Strength @ BUE shoulder muscle strength of 4/5 good influenced by fatigue. Transfer River@[email or web address] to toilet/commode requiring SBA; [email or web address] caregivers given no instruction/ cues. [email or web address]@[employer]. Pharyngeal @ pharyngeal/laryngeal strength exercises given min-no verbal instruction/cues to improve swallowing safety/efficiency with independent; [email or web address].       Nursing Update:stable      Discharge Date:TBD      Assign to [facility] Manager:

## 2019-07-22 ENCOUNTER — PATIENT OUTREACH (OUTPATIENT)
Dept: CASE MANAGEMENT | Age: 74
End: 2019-07-22

## 2019-07-22 NOTE — PROGRESS NOTES
Community Care Team documentation for patient in [facility]    The information below provided by:[facility]    PT Update: Standing Balance @ F- Dynamic: (Accepts minimal challenge): unable to maintain balance while turning head/ trunk for 10 minutes. Ambulation @ 500 ft without AD and requiring SBA for safety. Transfer @ Sup;  Light housekeeping @ utilizing no assistive device requiring SBA and min vc for safety. Strength Shoulder @ 4+/5 good plus (full ROM against and takes moderate to strong resistance) in order to complete self care and light housekeeping tasks. [email or web address]         Nursing Update:Continues with colostomy, has hemoglobin of 7.9- no new orders    Discharge Date:7/24/19 [facility]      Assign to [facility]

## 2019-07-25 ENCOUNTER — PATIENT OUTREACH (OUTPATIENT)
Dept: CASE MANAGEMENT | Age: 74
End: 2019-07-25

## 2019-07-25 NOTE — PROGRESS NOTES
Incoming call from patient - has returned my call from earlier today. Reports that he is doing well  Declines Home Health  Has yet to make an appointment with PCP - encouraged him to do this    Patient was most recently admitted 6/13 - 6/19 for acute, recurrent, complicated UTI with urine culture growing enterococcus - subsequent septic shock. Had a PICC line for IV antibiotics for yeast blood infection with Infectious Disease involved. Patient has rectal cancer and fistula, s/p colon resection/colostomy placement. History of DVT. Patient identified as High Risk for Readmission back in March during first admission of this year. Now discharged from [facility] to home, lives alone. States he is cooking for himself and \"working out. \"    Patient declines need for RN Case Manager  Will route chart to PCP and medical assistant in effort to get patient hospital follow up appointment. (Currently scheduled appointments are in December)  Will plan to check in with patient in a couple of weeks just to insure he continues to do well.

## 2019-07-25 NOTE — Clinical Note
This patient really needs an appointment. He has declined Home Health support and with his complex series of hospitalizations, he may need a close eye - even though he doesn't feel he needs it. See my note(s) -- (they date back into March, but tried to summarize in this one today).

## 2019-07-25 NOTE — PROGRESS NOTES
Community Care Team documentation for patient in [facility]    The information below provided by:Naif [facility]    PT Update: Standing Balance @ F- Dynamic: (Accepts minimal challenge): unable to maintain balance while turning head/ trunk for 10 minutes. Ambulation @ 500 ft without AD and requiring SBA for safety. Transfer Bed<>Wheelchair @ supervision; Transfer Sit <> Stand @ supervision Light Housekeeping @ utilizing no assistive device requiring SBA/supervision. Transfer Bathing @ step into and out of shower with balance grade of F dynamic (able to maintain balance without balance loss or UE suport and min weight shift ipsilaterally/front, difficulty crossing midline without balance loss) requiring SBA;   [address] reg diet      Discharge Date:7/24/19 W/ [facility] - however, pt refused [facility]      Assign to [facility]

## 2019-07-25 NOTE — PROGRESS NOTES
Patient discharged yesterday 7/24 from STR at [address] today - no answer. Left message requesting return call. [facility], pending return call.

## 2019-08-09 ENCOUNTER — HOSPITAL ENCOUNTER (OUTPATIENT)
Dept: LAB | Age: 74
Discharge: HOME | End: 2019-08-09
Payer: MEDICARE

## 2019-08-09 DIAGNOSIS — C20 RECTAL CANCER (HCC): ICD-10-CM

## 2019-08-09 LAB
ALBUMIN SERPL-MCNC: 3.5 G/DL (ref 3.2–4.6)
ALBUMIN/GLOB SERPL: 0.9 {RATIO} (ref 1.2–3.5)
ALP SERPL-CCNC: 121 U/L (ref 50–136)
ALT SERPL-CCNC: 27 U/L (ref 12–65)
ANION GAP SERPL CALC-SCNC: 4 MMOL/L (ref 7–16)
AST SERPL-CCNC: 23 U/L (ref 15–37)
BASOPHILS # BLD: 0.1 K/UL (ref 0–0.2)
BASOPHILS NFR BLD: 1 % (ref 0–2)
BILIRUB SERPL-MCNC: 0.4 MG/DL (ref 0.2–1.1)
BUN SERPL-MCNC: 20 MG/DL (ref 8–23)
CALCIUM SERPL-MCNC: 9.2 MG/DL (ref 8.3–10.4)
CEA SERPL-MCNC: 1 NG/ML (ref 0–3)
CHLORIDE SERPL-SCNC: 106 MMOL/L (ref 98–107)
CO2 SERPL-SCNC: 27 MMOL/L (ref 21–32)
CREAT SERPL-MCNC: 1.02 MG/DL (ref 0.8–1.5)
DIFFERENTIAL METHOD BLD: ABNORMAL
EOSINOPHIL # BLD: 0 K/UL (ref 0–0.8)
EOSINOPHIL NFR BLD: 0 % (ref 0.5–7.8)
ERYTHROCYTE [DISTWIDTH] IN BLOOD BY AUTOMATED COUNT: 17.4 % (ref 11.9–14.6)
GLOBULIN SER CALC-MCNC: 4.1 G/DL (ref 2.3–3.5)
GLUCOSE SERPL-MCNC: 86 MG/DL (ref 65–100)
HCT VFR BLD AUTO: 33.2 % (ref 41.1–50.3)
HGB BLD-MCNC: 10.1 G/DL (ref 13.6–17.2)
IMM GRANULOCYTES # BLD AUTO: 0 K/UL (ref 0–0.5)
IMM GRANULOCYTES NFR BLD AUTO: 0 % (ref 0–5)
LYMPHOCYTES # BLD: 0.6 K/UL (ref 0.5–4.6)
LYMPHOCYTES NFR BLD: 11 % (ref 13–44)
MCH RBC QN AUTO: 25.9 PG (ref 26.1–32.9)
MCHC RBC AUTO-ENTMCNC: 30.4 G/DL (ref 31.4–35)
MCV RBC AUTO: 85.1 FL (ref 79.6–97.8)
MONOCYTES # BLD: 0.4 K/UL (ref 0.1–1.3)
MONOCYTES NFR BLD: 7 % (ref 4–12)
NEUTS SEG # BLD: 4 K/UL (ref 1.7–8.2)
NEUTS SEG NFR BLD: 80 % (ref 43–78)
NRBC # BLD: 0 K/UL (ref 0–0.2)
PLATELET # BLD AUTO: 277 K/UL (ref 150–450)
PMV BLD AUTO: 8.8 FL (ref 9.4–12.3)
POTASSIUM SERPL-SCNC: 4.9 MMOL/L (ref 3.5–5.1)
PROT SERPL-MCNC: 7.6 G/DL (ref 6.3–8.2)
RBC # BLD AUTO: 3.9 M/UL (ref 4.23–5.67)
SODIUM SERPL-SCNC: 137 MMOL/L (ref 136–145)
WBC # BLD AUTO: 5 K/UL (ref 4.3–11.1)

## 2019-08-09 PROCEDURE — 36415 COLL VENOUS BLD VENIPUNCTURE: CPT

## 2019-08-09 PROCEDURE — 82378 CARCINOEMBRYONIC ANTIGEN: CPT

## 2019-08-09 PROCEDURE — 80053 COMPREHEN METABOLIC PANEL: CPT

## 2019-08-09 PROCEDURE — 85025 COMPLETE CBC W/AUTO DIFF WBC: CPT

## 2019-08-16 ENCOUNTER — PATIENT OUTREACH (OUTPATIENT)
Dept: CASE MANAGEMENT | Age: 74
End: 2019-08-16

## 2019-08-16 NOTE — PROGRESS NOTES
Follow up call with patient  No answer - unable to reach, unable to leave a voice mail  Patient has had follow up with PCP and Oncology    Patient had declined Case Management Services at first outreach. No further outreach planned at this time, unless patient calls back and has changed his mind.     Name removed Care Team  Episode Resolved

## 2020-08-31 ENCOUNTER — HOSPITAL ENCOUNTER (OUTPATIENT)
Dept: CT IMAGING | Age: 75
Discharge: HOME | End: 2020-08-31
Attending: UROLOGY
Payer: MEDICARE

## 2020-08-31 DIAGNOSIS — R31.0 GROSS HEMATURIA: ICD-10-CM

## 2020-08-31 LAB — CREAT BLD-MCNC: 1.2 MG/DL (ref 0.8–1.5)

## 2020-08-31 PROCEDURE — 74011000636 HC RX REV CODE- 636: Performed by: UROLOGY

## 2020-08-31 PROCEDURE — 82565 ASSAY OF CREATININE: CPT

## 2020-08-31 PROCEDURE — 74011000258 HC RX REV CODE- 258: Performed by: UROLOGY

## 2020-08-31 PROCEDURE — 74178 CT ABD&PLV WO CNTR FLWD CNTR: CPT

## 2020-08-31 RX ORDER — SODIUM CHLORIDE 0.9 % (FLUSH) 0.9 %
10 SYRINGE (ML) INJECTION
Status: COMPLETED | OUTPATIENT
Start: 2020-08-31 | End: 2020-08-31

## 2020-08-31 RX ADMIN — IOPAMIDOL 100 ML: 755 INJECTION, SOLUTION INTRAVENOUS at 12:20

## 2020-08-31 RX ADMIN — SODIUM CHLORIDE 100 ML: 900 INJECTION, SOLUTION INTRAVENOUS at 12:20

## 2020-08-31 RX ADMIN — Medication 10 ML: at 12:20

## 2021-03-04 ENCOUNTER — ANESTHESIA EVENT (OUTPATIENT)
Dept: ENDOSCOPY | Age: 76
End: 2021-03-04
Payer: MEDICARE

## 2021-03-05 ENCOUNTER — HOSPITAL ENCOUNTER (OUTPATIENT)
Age: 76
Setting detail: OUTPATIENT SURGERY
Discharge: HOME | End: 2021-03-05
Attending: SURGERY | Admitting: SURGERY
Payer: MEDICARE

## 2021-03-05 ENCOUNTER — ANESTHESIA (OUTPATIENT)
Dept: ENDOSCOPY | Age: 76
End: 2021-03-05
Payer: MEDICARE

## 2021-03-05 VITALS
SYSTOLIC BLOOD PRESSURE: 131 MMHG | HEIGHT: 68 IN | WEIGHT: 120 LBS | DIASTOLIC BLOOD PRESSURE: 80 MMHG | HEART RATE: 60 BPM | RESPIRATION RATE: 16 BRPM | TEMPERATURE: 97 F | BODY MASS INDEX: 18.19 KG/M2 | OXYGEN SATURATION: 99 %

## 2021-03-05 DIAGNOSIS — K62.4 RECTAL STRICTURE: ICD-10-CM

## 2021-03-05 DIAGNOSIS — K52.89 DIVERSION COLITIS: ICD-10-CM

## 2021-03-05 DIAGNOSIS — Z85.048 HISTORY OF RECTAL CANCER: ICD-10-CM

## 2021-03-05 DIAGNOSIS — K63.5 POLYP OF COLON, UNSPECIFIED PART OF COLON, UNSPECIFIED TYPE: ICD-10-CM

## 2021-03-05 PROCEDURE — 74011250636 HC RX REV CODE- 250/636: Performed by: NURSE ANESTHETIST, CERTIFIED REGISTERED

## 2021-03-05 PROCEDURE — 2709999900 HC NON-CHARGEABLE SUPPLY: Performed by: SURGERY

## 2021-03-05 PROCEDURE — 88305 TISSUE EXAM BY PATHOLOGIST: CPT

## 2021-03-05 PROCEDURE — 76040000025: Performed by: SURGERY

## 2021-03-05 PROCEDURE — 74011250636 HC RX REV CODE- 250/636: Performed by: ANESTHESIOLOGY

## 2021-03-05 PROCEDURE — 44394 COLONOSCOPY W/SNARE: CPT | Performed by: SURGERY

## 2021-03-05 PROCEDURE — 76060000032 HC ANESTHESIA 0.5 TO 1 HR: Performed by: SURGERY

## 2021-03-05 PROCEDURE — 77030013991 HC SNR POLYP ENDOSC BSC -A: Performed by: SURGERY

## 2021-03-05 PROCEDURE — 74011000250 HC RX REV CODE- 250: Performed by: NURSE ANESTHETIST, CERTIFIED REGISTERED

## 2021-03-05 RX ORDER — MESALAMINE 4 G/60ML
4 ENEMA RECTAL EVERY OTHER DAY
Qty: 1000 ML | Refills: 2 | Status: SHIPPED | OUTPATIENT
Start: 2021-03-05 | End: 2021-05-31

## 2021-03-05 RX ORDER — SODIUM CHLORIDE, SODIUM LACTATE, POTASSIUM CHLORIDE, CALCIUM CHLORIDE 600; 310; 30; 20 MG/100ML; MG/100ML; MG/100ML; MG/100ML
100 INJECTION, SOLUTION INTRAVENOUS CONTINUOUS
Status: DISCONTINUED | OUTPATIENT
Start: 2021-03-05 | End: 2021-03-05 | Stop reason: HOSPADM

## 2021-03-05 RX ORDER — LIDOCAINE HYDROCHLORIDE 20 MG/ML
INJECTION, SOLUTION EPIDURAL; INFILTRATION; INTRACAUDAL; PERINEURAL AS NEEDED
Status: DISCONTINUED | OUTPATIENT
Start: 2021-03-05 | End: 2021-03-05 | Stop reason: HOSPADM

## 2021-03-05 RX ORDER — PROPOFOL 10 MG/ML
INJECTION, EMULSION INTRAVENOUS
Status: DISCONTINUED | OUTPATIENT
Start: 2021-03-05 | End: 2021-03-05 | Stop reason: HOSPADM

## 2021-03-05 RX ORDER — PROPOFOL 10 MG/ML
INJECTION, EMULSION INTRAVENOUS AS NEEDED
Status: DISCONTINUED | OUTPATIENT
Start: 2021-03-05 | End: 2021-03-05 | Stop reason: HOSPADM

## 2021-03-05 RX ADMIN — PROPOFOL 160 MCG/KG/MIN: 10 INJECTION, EMULSION INTRAVENOUS at 10:13

## 2021-03-05 RX ADMIN — PROPOFOL 20 MG: 10 INJECTION, EMULSION INTRAVENOUS at 10:15

## 2021-03-05 RX ADMIN — SODIUM CHLORIDE, SODIUM LACTATE, POTASSIUM CHLORIDE, AND CALCIUM CHLORIDE: 600; 310; 30; 20 INJECTION, SOLUTION INTRAVENOUS at 10:09

## 2021-03-05 RX ADMIN — PROPOFOL 30 MG: 10 INJECTION, EMULSION INTRAVENOUS at 10:13

## 2021-03-05 RX ADMIN — LIDOCAINE HYDROCHLORIDE 40 MG: 20 INJECTION, SOLUTION EPIDURAL; INFILTRATION; INTRACAUDAL; PERINEURAL at 10:13

## 2021-03-05 RX ADMIN — PROPOFOL 20 MG: 10 INJECTION, EMULSION INTRAVENOUS at 10:16

## 2021-03-05 RX ADMIN — SODIUM CHLORIDE, SODIUM LACTATE, POTASSIUM CHLORIDE, AND CALCIUM CHLORIDE 100 ML/HR: 600; 310; 30; 20 INJECTION, SOLUTION INTRAVENOUS at 09:02

## 2021-03-05 NOTE — PROGRESS NOTES
Patient denies any needs. Vital signs stable. Discharge instructions given to patient and . No other concerns noted at this time. Iv acces out. Patient's colostomy leaking slightly. Patient verbalized wanting to change at home with his own supplies as he is heading home directly after this. Patient wheeled out to car via wheelchair with staff.

## 2021-03-05 NOTE — ANESTHESIA PREPROCEDURE EVALUATION
Relevant Problems   No relevant active problems       Anesthetic History   No history of anesthetic complications            Review of Systems / Medical History  Patient summary reviewed and pertinent labs reviewed    Pulmonary  Within defined limits                 Neuro/Psych   Within defined limits           Cardiovascular  Within defined limits                Exercise tolerance: >4 METS     GI/Hepatic/Renal         Renal disease: stones       Endo/Other        Arthritis and cancer (rectal cancer)     Other Findings              Physical Exam    Airway  Mallampati: II  TM Distance: 4 - 6 cm  Neck ROM: normal range of motion   Mouth opening: Normal     Cardiovascular  Regular rate and rhythm,  S1 and S2 normal,  no murmur, click, rub, or gallop             Dental         Pulmonary  Breath sounds clear to auscultation               Abdominal         Other Findings            Anesthetic Plan    ASA: 2  Anesthesia type: total IV anesthesia          Induction: Intravenous  Anesthetic plan and risks discussed with: Patient

## 2021-03-05 NOTE — PROCEDURES
Procedure in Detail:  Informed consent was obtained for the procedure. The patient was placed in the left lateral decubitus position and sedation was induced by anesthesia. The scope was inserted into the rectum and advanced under direct vision to the a distance of 15 cm for rectal examination and then was withdrawn and inserted via the LUQ stoma and advanced 25cm to his ileocolostomy. The quality of the colonic preparation was adequate. A careful inspection was made as the colonoscope was withdrawn, including a retroflexed view of the rectum; findings and interventions are described below. Appropriate photodocumentation was obtained. Findings:   Rectum:   - Flat lesions:     - Colitis: moderate diversion colitis.  mid-rectal stricture required switch to gastroscope  Descending Colon:   - Protruding lesions:     -stomal margin Polyp(s) size 6 mm removed by polypectomy (snare cautery)  Terminal Ileum: normal mucosa of colon remnant and widely patent ileocolic anastomosis    Specimens: Specimens were collected and sent to pathology. Complications: None; patient tolerated the procedure well. \    EBL - minimal    Recommendations:   - Await pathology. - Rx for mesalamine enema to pharmacy- start with 1/2 dose enema QOD.      Signed By: Emory Lanier MD                        March 5, 2021

## 2021-03-05 NOTE — ANESTHESIA POSTPROCEDURE EVALUATION
Procedure(s):  COLONOSCOPY/BMI 19  ENDOSCOPIC POLYPECTOMY. total IV anesthesia    Anesthesia Post Evaluation      Multimodal analgesia: multimodal analgesia not used between 6 hours prior to anesthesia start to PACU discharge  Patient location during evaluation: PACU  Patient participation: complete - patient participated  Level of consciousness: awake and alert  Pain score: 0  Pain management: adequate  Airway patency: patent  Anesthetic complications: no  Cardiovascular status: acceptable and hemodynamically stable  Respiratory status: acceptable and spontaneous ventilation  Hydration status: acceptable  Post anesthesia nausea and vomiting:  none  Final Post Anesthesia Temperature Assessment:  Normothermia (36.0-37.5 degrees C)      INITIAL Post-op Vital signs:   Vitals Value Taken Time   /67 03/05/21 1052   Temp 36.1 °C (97 °F) 03/05/21 1045   Pulse 62 03/05/21 1056   Resp 16 03/05/21 1045   SpO2 99 % 03/05/21 1056   Vitals shown include unvalidated device data.

## 2021-03-05 NOTE — DISCHARGE INSTRUCTIONS
Hanane Nicole M.D.  [phone]    Instructions following colonoscopy:    ACTIVITY:   Resume usual, basic activities around the house today.  You may be light-headed or sleepy from anesthesia, so be careful going up and down stairs.  Avoid driving, operating machinery, or signing documents for 24 hours. DIET:   No restriction. Please note, some people may have nausea or cramps after this procedure which can result in an upset stomach after eating.  Many people have loose stools or diarrhea immediately after colonoscopy. It is also not uncommon to not have a bowel movement for 2-3 days. PAIN:   Some cramping or gas pain is normal after colonoscopy. However, if you experience worsening pain over the course of the day, or pain with associated fever please call the office immediately      [address] IF:   You have a temperature higher than 101.5° Fahrenheit for more than 6 hours.  You have severe nausea or vomiting not relieved by medication; or diarrhea. If you take a blood thinning medicine resume it:    Otherwise, continue home medications as previously prescribed. Plan:  1. Office will call with pathology results. 2. Prescription for mesalamine enema- sent to pharmacy.

## 2021-03-05 NOTE — H&P
History and Physical      Patient: Karen Marroquin    Physician: Daniel Garcia MD    Referring Physician: Amy Baker DO    Chief Complaint: For colonoscopy    History of Present Illness: Pt presents for colonoscopy for colon cancer surveillance s/p LAR w/ileostomy 11/2013. Multiple subsequent procedures due to complications. Last exam 2/2018- diversion colitis distal to stoma; normal colon proximal to stoma.     History:  Past Medical History:   Diagnosis Date    Acute deep vein thrombosis (DVT) of non-extremity vein 5/20/2019    Family history of malignant neoplasm of gastrointestinal tract     mother colon/ovarian cancer 67    Fecal incontinence     LAR syndrome    Former cigarette smoker     History of rectal cancer     Hypothyroid     stable w/med    Infection and inflammatory reaction due to other internal prosthetic devices, implants and grafts, subsequent encounter 2017    abd wound/mesh colostomy    Insomnia     takes meds    Major depression     Osteoarthritis, hand     Personal history of colonic polyps 9/2013    x 1    Personal history of malignant neoplasm of rectum, rectosigmoid junction, and anus 9/2013    Psychiatric disorder     anxiety     Past Surgical History:   Procedure Laterality Date    COLONOSCOPY N/A 2/12/2018    COLONOSCOPY / BMI=21 performed by Daniel Garcia MD at [address] [place] [address]  2/12/2018         ENDOSCOPY, COLON, DIAGNOSTIC  last 2/3/15    Emily--no polyps--3 year recall    HX COLOSTOMY  5/11/16    HX GI  4/2016    Sacral nerve stimlator lead trial (unsucessful) and removal    HX HERNIA REPAIR  3/2015, 5/2016    HX HERNIA REPAIR      and Colostomy in May    HX OTHER SURGICAL  10/7/2013    Emily---endorectal us    HX OTHER SURGICAL Bilateral     cataracts  2017    HX POLYPECTOMY      HX TOTAL COLECTOMY  11/2013    Emily--lap LAR with coloproctostomy, mobilization splenic flexure, diverting loop ileostomy    HX TOTAL COLECTOMY Right 2014    for leak    HX VASCULAR ACCESS Left     single lumen port/subsequently removed    IR INSERT TUNL CVC W/O PORT OVER 5 YR  2019    IR REPLACE CVC TUNNELED W/O PORT  2019      Social History     Socioeconomic History    Marital status: SINGLE     Spouse name: Not on file    Number of children: Not on file    Years of education: Not on file    Highest education level: Not on file   Tobacco Use    Smoking status: Former Smoker     Packs/day: 1.00     Years: 4.00     Pack years: 4.00     Types: Cigarettes     Quit date: 1963     Years since quittin.3    Smokeless tobacco: Never Used   Substance and Sexual Activity    Alcohol use: Not Currently     Alcohol/week: 11.7 standard drinks     Types: 14 Cans of beer per week     Comment: advised stop drinking given issues    Drug use: No    Sexual activity: Never      Family History   Problem Relation Age of Onset    Cancer Mother         colon and ovarian    Cancer Brother         prostate    Alcohol abuse Brother     Cancer Maternal Grandmother         bone    Alcohol abuse Father     Alcohol abuse Sister     Stroke Paternal Grandfather        Medications:   Prior to Admission medications    Medication Sig Start Date End Date Taking? Authorizing Provider   levothyroxine (SYNTHROID) 125 mcg tablet TAKE ONE TABLET BY MOUTH ONE TIME DAILY BEFORE BREAKFAST 12/10/20   Roger Burris,    clonazePAM (KLONOPIN) 2 mg tablet Take 2 Tabs by mouth nightly. Max Daily Amount: 4 mg. Indications: takes for sleep 19   Rain Rajan MD   amitriptyline (ELAVIL) 100 mg tablet Take 1 Tab by mouth nightly. Dr Rose Dire psych  Indications: takes for sleep 19   Rain Rajan MD   OLANZapine THE PAVILIION) 2.5 mg tablet Take 7.5 mg by mouth nightly. Indications: Takes 3 x 2.5 mg tabs    Provider, Historical   Cholecalciferol, Vitamin D3, (VITAMIN D3) 2,000 unit cap capsule Take 2,000 Units by mouth two (2) times a day. 10/15/18   Celeste Clutter, DO       Allergies: No Known Allergies    Physical Exam:     Vital Signs:   Visit Vitals  /72   Pulse (!) 59   Temp 97.7 °F (36.5 °C)   Resp 16   Ht 5' 8\" (1.727 m)   Wt 120 lb (54.4 kg)   SpO2 99%   BMI 18.25 kg/m²     . General: no distress      Heart: regular   Lungs: unlabored   Abdominal: soft   Neurological: Grossly normal        Findings/Diagnosis: .Screening for colorectal cancer due to h/o rectal cancer. Plan of Care/Planned Procedure: Colonoscopy, possible polypectomy. Pt/designee has reviewed the colonoscopy information sheet. Any questions have been discussed. They agree to proceed.       Signed:  Serafin Arnett MD   3/5/2021

## 2021-05-11 ENCOUNTER — APPOINTMENT (OUTPATIENT)
Dept: CT IMAGING | Age: 76
DRG: 388 | End: 2021-05-11
Attending: EMERGENCY MEDICINE
Payer: MEDICARE

## 2021-05-11 ENCOUNTER — HOSPITAL ENCOUNTER (INPATIENT)
Age: 76
LOS: 4 days | Discharge: HOME HEALTH | DRG: 388 | End: 2021-05-15
Attending: EMERGENCY MEDICINE | Admitting: FAMILY MEDICINE
Payer: MEDICARE

## 2021-05-11 DIAGNOSIS — K56.609 INTESTINAL OBSTRUCTION, UNSPECIFIED CAUSE, UNSPECIFIED WHETHER PARTIAL OR COMPLETE (HCC): Primary | ICD-10-CM

## 2021-05-11 DIAGNOSIS — R19.00 PELVIC MASS: ICD-10-CM

## 2021-05-11 DIAGNOSIS — C20 RECTAL CARCINOMA (HCC): ICD-10-CM

## 2021-05-11 DIAGNOSIS — Z85.048 HISTORY OF RECTAL CANCER: ICD-10-CM

## 2021-05-11 DIAGNOSIS — K86.89 PANCREATIC DUCT DILATED: ICD-10-CM

## 2021-05-11 DIAGNOSIS — L02.91 ABSCESS: ICD-10-CM

## 2021-05-11 DIAGNOSIS — N13.4 HYDROURETER, RIGHT: ICD-10-CM

## 2021-05-11 DIAGNOSIS — K56.609 SMALL BOWEL OBSTRUCTION (HCC): ICD-10-CM

## 2021-05-11 PROBLEM — K83.8 DILATED INTRAHEPATIC BILE DUCT: Status: ACTIVE | Noted: 2021-05-11

## 2021-05-11 PROBLEM — N39.0 UTI (URINARY TRACT INFECTION): Status: ACTIVE | Noted: 2021-05-11

## 2021-05-11 PROBLEM — F32.A DEPRESSION: Status: ACTIVE | Noted: 2021-05-11

## 2021-05-11 PROBLEM — F41.9 ANXIETY: Status: ACTIVE | Noted: 2021-05-11

## 2021-05-11 PROBLEM — N13.30 BILATERAL HYDRONEPHROSIS: Status: ACTIVE | Noted: 2021-05-11

## 2021-05-11 LAB
ALBUMIN SERPL-MCNC: 3.3 G/DL (ref 3.2–4.6)
ALBUMIN/GLOB SERPL: 0.7 {RATIO} (ref 1.2–3.5)
ALP SERPL-CCNC: 91 U/L (ref 50–136)
ALT SERPL-CCNC: 24 U/L (ref 12–65)
ANION GAP SERPL CALC-SCNC: 8 MMOL/L (ref 7–16)
AST SERPL-CCNC: 16 U/L (ref 15–37)
BACTERIA URNS QL MICRO: ABNORMAL /HPF
BASOPHILS # BLD: 0 K/UL (ref 0–0.2)
BASOPHILS NFR BLD: 0 % (ref 0–2)
BILIRUB SERPL-MCNC: 0.4 MG/DL (ref 0.2–1.1)
BUN SERPL-MCNC: 28 MG/DL (ref 8–23)
CALCIUM SERPL-MCNC: 9.4 MG/DL (ref 8.3–10.4)
CASTS URNS QL MICRO: ABNORMAL /LPF
CEA SERPL-MCNC: 1.6 NG/ML (ref 0–3)
CHLORIDE SERPL-SCNC: 100 MMOL/L (ref 98–107)
CO2 SERPL-SCNC: 30 MMOL/L (ref 21–32)
CREAT SERPL-MCNC: 1.19 MG/DL (ref 0.8–1.5)
CRYSTALS URNS QL MICRO: 0 /LPF
DIFFERENTIAL METHOD BLD: ABNORMAL
EOSINOPHIL # BLD: 0 K/UL (ref 0–0.8)
EOSINOPHIL NFR BLD: 0 % (ref 0.5–7.8)
EPI CELLS #/AREA URNS HPF: ABNORMAL /HPF
ERYTHROCYTE [DISTWIDTH] IN BLOOD BY AUTOMATED COUNT: 13.6 % (ref 11.9–14.6)
GLOBULIN SER CALC-MCNC: 4.7 G/DL (ref 2.3–3.5)
GLUCOSE SERPL-MCNC: 131 MG/DL (ref 65–100)
HCT VFR BLD AUTO: 37.6 % (ref 41.1–50.3)
HGB BLD-MCNC: 11.7 G/DL (ref 13.6–17.2)
IMM GRANULOCYTES # BLD AUTO: 0 K/UL (ref 0–0.5)
IMM GRANULOCYTES NFR BLD AUTO: 0 % (ref 0–5)
LIPASE SERPL-CCNC: 111 U/L (ref 73–393)
LYMPHOCYTES # BLD: 0.3 K/UL (ref 0.5–4.6)
LYMPHOCYTES NFR BLD: 4 % (ref 13–44)
MCH RBC QN AUTO: 27.3 PG (ref 26.1–32.9)
MCHC RBC AUTO-ENTMCNC: 31.1 G/DL (ref 31.4–35)
MCV RBC AUTO: 87.9 FL (ref 79.6–97.8)
MONOCYTES # BLD: 0.6 K/UL (ref 0.1–1.3)
MONOCYTES NFR BLD: 8 % (ref 4–12)
MUCOUS THREADS URNS QL MICRO: 0 /LPF
NEUTS SEG # BLD: 7.1 K/UL (ref 1.7–8.2)
NEUTS SEG NFR BLD: 88 % (ref 43–78)
NRBC # BLD: 0 K/UL (ref 0–0.2)
PLATELET # BLD AUTO: 370 K/UL (ref 150–450)
PMV BLD AUTO: 9 FL (ref 9.4–12.3)
POTASSIUM SERPL-SCNC: 4.2 MMOL/L (ref 3.5–5.1)
PROT SERPL-MCNC: 8 G/DL (ref 6.3–8.2)
RBC # BLD AUTO: 4.28 M/UL (ref 4.23–5.6)
RBC #/AREA URNS HPF: >100 /HPF
SODIUM SERPL-SCNC: 138 MMOL/L (ref 136–145)
WBC # BLD AUTO: 8.1 K/UL (ref 4.3–11.1)
WBC URNS QL MICRO: >100 /HPF

## 2021-05-11 PROCEDURE — 74011250636 HC RX REV CODE- 250/636: Performed by: EMERGENCY MEDICINE

## 2021-05-11 PROCEDURE — 87088 URINE BACTERIA CULTURE: CPT

## 2021-05-11 PROCEDURE — 87186 SC STD MICRODIL/AGAR DIL: CPT

## 2021-05-11 PROCEDURE — 80053 COMPREHEN METABOLIC PANEL: CPT

## 2021-05-11 PROCEDURE — 74011000258 HC RX REV CODE- 258: Performed by: EMERGENCY MEDICINE

## 2021-05-11 PROCEDURE — 65270000029 HC RM PRIVATE

## 2021-05-11 PROCEDURE — 74177 CT ABD & PELVIS W/CONTRAST: CPT

## 2021-05-11 PROCEDURE — 81015 MICROSCOPIC EXAM OF URINE: CPT

## 2021-05-11 PROCEDURE — 96375 TX/PRO/DX INJ NEW DRUG ADDON: CPT

## 2021-05-11 PROCEDURE — 82378 CARCINOEMBRYONIC ANTIGEN: CPT

## 2021-05-11 PROCEDURE — 99283 EMERGENCY DEPT VISIT LOW MDM: CPT

## 2021-05-11 PROCEDURE — 83690 ASSAY OF LIPASE: CPT

## 2021-05-11 PROCEDURE — 96374 THER/PROPH/DIAG INJ IV PUSH: CPT

## 2021-05-11 PROCEDURE — 2709999900 HC NON-CHARGEABLE SUPPLY

## 2021-05-11 PROCEDURE — 74011250636 HC RX REV CODE- 250/636: Performed by: FAMILY MEDICINE

## 2021-05-11 PROCEDURE — 74011250637 HC RX REV CODE- 250/637: Performed by: FAMILY MEDICINE

## 2021-05-11 PROCEDURE — 85025 COMPLETE CBC W/AUTO DIFF WBC: CPT

## 2021-05-11 PROCEDURE — 87086 URINE CULTURE/COLONY COUNT: CPT

## 2021-05-11 PROCEDURE — 74011000250 HC RX REV CODE- 250: Performed by: FAMILY MEDICINE

## 2021-05-11 PROCEDURE — 74011000636 HC RX REV CODE- 636: Performed by: EMERGENCY MEDICINE

## 2021-05-11 PROCEDURE — 99223 1ST HOSP IP/OBS HIGH 75: CPT | Performed by: SURGERY

## 2021-05-11 PROCEDURE — 74011250637 HC RX REV CODE- 250/637: Performed by: SURGERY

## 2021-05-11 PROCEDURE — 74011000258 HC RX REV CODE- 258: Performed by: FAMILY MEDICINE

## 2021-05-11 RX ORDER — SODIUM CHLORIDE 0.9 % (FLUSH) 0.9 %
5-40 SYRINGE (ML) INJECTION AS NEEDED
Status: DISCONTINUED | OUTPATIENT
Start: 2021-05-11 | End: 2021-05-15 | Stop reason: HOSPADM

## 2021-05-11 RX ORDER — MESALAMINE 1000 MG/1
1 SUPPOSITORY RECTAL EVERY 12 HOURS
Status: DISCONTINUED | OUTPATIENT
Start: 2021-05-11 | End: 2021-05-15 | Stop reason: HOSPADM

## 2021-05-11 RX ORDER — ONDANSETRON 2 MG/ML
4 INJECTION INTRAMUSCULAR; INTRAVENOUS
Status: DISCONTINUED | OUTPATIENT
Start: 2021-05-11 | End: 2021-05-15 | Stop reason: HOSPADM

## 2021-05-11 RX ORDER — SODIUM CHLORIDE 0.9 % (FLUSH) 0.9 %
5-40 SYRINGE (ML) INJECTION EVERY 8 HOURS
Status: DISCONTINUED | OUTPATIENT
Start: 2021-05-11 | End: 2021-05-15 | Stop reason: HOSPADM

## 2021-05-11 RX ORDER — ONDANSETRON 2 MG/ML
4 INJECTION INTRAMUSCULAR; INTRAVENOUS ONCE
Status: COMPLETED | OUTPATIENT
Start: 2021-05-11 | End: 2021-05-11

## 2021-05-11 RX ORDER — CLONAZEPAM 1 MG/1
6 TABLET ORAL
Status: DISCONTINUED | OUTPATIENT
Start: 2021-05-11 | End: 2021-05-12

## 2021-05-11 RX ORDER — SODIUM CHLORIDE 0.9 % (FLUSH) 0.9 %
10 SYRINGE (ML) INJECTION
Status: COMPLETED | OUTPATIENT
Start: 2021-05-11 | End: 2021-05-11

## 2021-05-11 RX ORDER — ACETAMINOPHEN 325 MG/1
650 TABLET ORAL
Status: DISCONTINUED | OUTPATIENT
Start: 2021-05-11 | End: 2021-05-11

## 2021-05-11 RX ORDER — ENOXAPARIN SODIUM 100 MG/ML
40 INJECTION SUBCUTANEOUS DAILY
Status: DISCONTINUED | OUTPATIENT
Start: 2021-05-12 | End: 2021-05-15 | Stop reason: HOSPADM

## 2021-05-11 RX ORDER — MORPHINE SULFATE 2 MG/ML
0.5 INJECTION, SOLUTION INTRAMUSCULAR; INTRAVENOUS
Status: DISCONTINUED | OUTPATIENT
Start: 2021-05-11 | End: 2021-05-15 | Stop reason: HOSPADM

## 2021-05-11 RX ORDER — AMITRIPTYLINE HYDROCHLORIDE 25 MG/1
100 TABLET, FILM COATED ORAL
Status: DISCONTINUED | OUTPATIENT
Start: 2021-05-11 | End: 2021-05-15 | Stop reason: HOSPADM

## 2021-05-11 RX ORDER — ACETAMINOPHEN 650 MG/1
650 SUPPOSITORY RECTAL
Status: DISCONTINUED | OUTPATIENT
Start: 2021-05-11 | End: 2021-05-11

## 2021-05-11 RX ORDER — MORPHINE SULFATE 4 MG/ML
4 INJECTION INTRAVENOUS ONCE
Status: COMPLETED | OUTPATIENT
Start: 2021-05-11 | End: 2021-05-11

## 2021-05-11 RX ORDER — ACETAMINOPHEN 650 MG/1
650 SUPPOSITORY RECTAL
Status: DISCONTINUED | OUTPATIENT
Start: 2021-05-11 | End: 2021-05-15 | Stop reason: HOSPADM

## 2021-05-11 RX ORDER — POLYETHYLENE GLYCOL 3350 17 G/17G
17 POWDER, FOR SOLUTION ORAL DAILY PRN
Status: DISCONTINUED | OUTPATIENT
Start: 2021-05-11 | End: 2021-05-11

## 2021-05-11 RX ORDER — MORPHINE SULFATE 2 MG/ML
1 INJECTION, SOLUTION INTRAMUSCULAR; INTRAVENOUS
Status: DISCONTINUED | OUTPATIENT
Start: 2021-05-11 | End: 2021-05-11

## 2021-05-11 RX ORDER — SODIUM CHLORIDE 9 MG/ML
100 INJECTION, SOLUTION INTRAVENOUS CONTINUOUS
Status: DISCONTINUED | OUTPATIENT
Start: 2021-05-11 | End: 2021-05-12

## 2021-05-11 RX ORDER — PROMETHAZINE HYDROCHLORIDE 25 MG/1
12.5 TABLET ORAL
Status: DISCONTINUED | OUTPATIENT
Start: 2021-05-11 | End: 2021-05-11

## 2021-05-11 RX ORDER — LORAZEPAM 2 MG/ML
1 INJECTION INTRAMUSCULAR
Status: DISCONTINUED | OUTPATIENT
Start: 2021-05-11 | End: 2021-05-12

## 2021-05-11 RX ADMIN — AMITRIPTYLINE HYDROCHLORIDE 100 MG: 25 TABLET, FILM COATED ORAL at 22:13

## 2021-05-11 RX ADMIN — IOPAMIDOL 100 ML: 755 INJECTION, SOLUTION INTRAVENOUS at 09:54

## 2021-05-11 RX ADMIN — MESALAMINE 1000 MG: 1000 SUPPOSITORY RECTAL at 22:01

## 2021-05-11 RX ADMIN — SODIUM CHLORIDE 100 ML/HR: 9 INJECTION, SOLUTION INTRAVENOUS at 13:43

## 2021-05-11 RX ADMIN — MORPHINE SULFATE 4 MG: 4 INJECTION INTRAVENOUS at 08:39

## 2021-05-11 RX ADMIN — CEFTRIAXONE 1 G: 1 INJECTION, POWDER, FOR SOLUTION INTRAMUSCULAR; INTRAVENOUS at 16:17

## 2021-05-11 RX ADMIN — CLONAZEPAM 6 MG: 0.5 TABLET ORAL at 22:00

## 2021-05-11 RX ADMIN — Medication 10 ML: at 22:00

## 2021-05-11 RX ADMIN — DIATRIZOATE MEGLUMINE AND DIATRIZOATE SODIUM 15 ML: 660; 100 LIQUID ORAL; RECTAL at 08:39

## 2021-05-11 RX ADMIN — Medication 10 ML: at 13:44

## 2021-05-11 RX ADMIN — SODIUM CHLORIDE 1000 ML: 900 INJECTION, SOLUTION INTRAVENOUS at 08:39

## 2021-05-11 RX ADMIN — ONDANSETRON 4 MG: 2 INJECTION INTRAMUSCULAR; INTRAVENOUS at 08:40

## 2021-05-11 RX ADMIN — FAMOTIDINE 20 MG: 10 INJECTION, SOLUTION INTRAVENOUS at 12:46

## 2021-05-11 RX ADMIN — SODIUM CHLORIDE 100 ML/HR: 9 INJECTION, SOLUTION INTRAVENOUS at 13:20

## 2021-05-11 RX ADMIN — Medication 10 ML: at 09:54

## 2021-05-11 RX ADMIN — SODIUM CHLORIDE 100 ML: 900 INJECTION, SOLUTION INTRAVENOUS at 09:54

## 2021-05-11 RX ADMIN — Medication 1 AMPULE: at 22:00

## 2021-05-11 RX ADMIN — OLANZAPINE 7.5 MG: 5 TABLET, FILM COATED ORAL at 22:00

## 2021-05-11 NOTE — PROGRESS NOTES
END OF SHIFT    Pt now having loose output in colostomy bag- pt already emptied once  Per DR. Mabel Estrada, ok for pt to take his nighttime pysch meds with a sip of water (Klonipin, zyprexa, and elavil)  Bedside report given to [employer]

## 2021-05-11 NOTE — PROGRESS NOTES
05/11/21 1330   Dual Skin Pressure Injury Assessment   Dual Skin Pressure Injury Assessment WDL   Second Care Provider (Based on [address] [place] [address]) karen flor   Skin Integumentary   Skin Integumentary (WDL) WDL    Pressure  Injury Documentation No Pressure Injury Noted-Pressure Ulcer Prevention Initiated   Wound Prevention and Protection Methods   Orientation of Wound Prevention Posterior   Location of Wound Prevention Sacrum/Coccyx   Dressing Present  No   Wound Offloading (Prevention Methods) Bed, pressure redistribution/air;Bed, pressure reduction mattress

## 2021-05-11 NOTE — CONSULTS
Surgery Consult      Patient: Parul Marroquin 66 y.o. male     Consulting Physician:  ER/hospitalist    Chief Complaint: SBO    Subjective:      Parker Garcia is a 66 y.o. male who surgery has been asked to see for evaluation of a recurrent bowel obstruction. He is well known to surgery, having an extremely complicated course since diagnosis of rectal cancer in 2013 with neoadjuvant chemoradiation, with complete response, followed by definitive surgery and completion chemotherapy. He has subsequently had numerous surgical complications after stoma closure to include anastomotic leak(s), wound infections, hernia and mesh infection, and fistula formation. He has been successfully treated for SBO on several occasions. He had followed with Dr Pavel Larsen but has not been seen since 2019. He presented today to the ER due to abdominal pain yesterday with no stoma output for 24 hours. He has not had n/v, fever or chills. He does feel a little bloated. He has actually begun to have watery stoma output since arrival to ER.     Past Medical History:   Diagnosis Date    Acute deep vein thrombosis (DVT) of non-extremity vein 5/20/2019    Family history of malignant neoplasm of gastrointestinal tract     mother colon/ovarian cancer 67    Fecal incontinence     LAR syndrome    Former cigarette smoker     History of rectal cancer     Hypothyroid     stable w/med    Infection and inflammatory reaction due to other internal prosthetic devices, implants and grafts, subsequent encounter 2017    abd wound/mesh colostomy    Insomnia     takes meds    Major depression     Osteoarthritis, hand     Personal history of colonic polyps 9/2013    x 1    Personal history of malignant neoplasm of rectum, rectosigmoid junction, and anus 9/2013    Psychiatric disorder     anxiety       Past Surgical History:   Procedure Laterality Date    COLONOSCOPY N/A 2/12/2018    COLONOSCOPY / BMI=21 performed by Mandy Traylor MD at [facility]    COLONOSCOPY N/A 3/5/2021    COLONOSCOPY/BMI 19 performed by Filiberto Hinojosa MD at [address] [place] [address]  2018         COLONOSCOPY THRU STOMA,LESMADY RMVL W/SNARE  3/5/2021         ENDOSCOPY, COLON, DIAGNOSTIC  last 2/3/15    [facility]--no polyps--3 year recall    HX COLOSTOMY  16    HX GI  2016    Sacral nerve stimlator lead trial (unsucessful) and removal    HX HERNIA REPAIR  3/2015, 2016    HX HERNIA REPAIR      and Colostomy in May    HX OTHER SURGICAL  10/7/2013    [facility]---endorectal us    HX OTHER SURGICAL Bilateral     cataracts      HX POLYPECTOMY      HX TOTAL COLECTOMY  2013    [facility]--lap LAR with coloproctostomy, mobilization splenic flexure, diverting loop ileostomy    HX TOTAL COLECTOMY Right 2014    for leak    HX VASCULAR ACCESS Left     single lumen port/subsequently removed    IR INSERT TUNL CVC W/O PORT OVER 5 YR  2019    IR REPLACE CVC TUNNELED W/O PORT  2019        Family History   Problem Relation Age of Onset    Cancer Mother         colon and ovarian    Cancer Brother         prostate    Alcohol abuse Brother     Cancer Maternal Grandmother         bone    Alcohol abuse Father     Alcohol abuse Sister     Stroke Paternal Grandfather        Social History     Socioeconomic History    Marital status: SINGLE     Spouse name: Not on file    Number of children: Not on file    Years of education: Not on file    Highest education level: Not on file   Tobacco Use    Smoking status: Former Smoker     Packs/day: 1.00     Years: 4.00     Pack years: 4.00     Types: Cigarettes     Quit date: 1963     Years since quittin.5    Smokeless tobacco: Never Used   Substance and Sexual Activity    Alcohol use: Not Currently     Alcohol/week: 11.7 standard drinks     Types: 14 Cans of beer per week     Comment: advised stop drinking given issues    Drug use: No    Sexual activity: Never Current Facility-Administered Medications   Medication Dose Route Frequency    0.9% sodium chloride infusion  100 mL/hr IntraVENous CONTINUOUS    sodium chloride (NS) flush 5-40 mL  5-40 mL IntraVENous Q8H    sodium chloride (NS) flush 5-40 mL  5-40 mL IntraVENous PRN    ondansetron (ZOFRAN) injection 4 mg  4 mg IntraVENous Q6H PRN    [START ON 2021] enoxaparin (LOVENOX) injection 40 mg  40 mg SubCUTAneous DAILY    morphine injection 0.5 mg  0.5 mg IntraVENous Q4H PRN    acetaminophen (TYLENOL) suppository 650 mg  650 mg Rectal Q4H PRN    cefTRIAXone (ROCEPHIN) 1 g in 0.9% sodium chloride (MBP/ADV) 50 mL MBP  1 g IntraVENous Q24H    LORazepam (ATIVAN) injection 1 mg  1 mg IntraVENous QHS    [START ON 2021] famotidine (PF) (PEPCID) 20 mg in 0.9% sodium chloride 10 mL injection  20 mg IntraVENous Q24H        No Known Allergies    Review of Systems:  Pertinent items are noted in the History of Present Illness. Has ongoing rectal discharge- using suppository from GI with good result  No cp, sob, wt loss. Objective:        Patient Vitals for the past 8 hrs:   BP Temp Pulse Resp SpO2 Height Weight   21 1529 (!) 151/79 98.3 °F (36.8 °C) 81 18 98 %     21 1325 (!) 154/81 98 °F (36.7 °C) 94 18 96 %     21 0900 126/76    96 %     21 0857     97 %     21 0806 104/75         21 0804  98.1 °F (36.7 °C) (!) 109 18 96 % 5' 8\" (1.727 m) 115 lb (52.2 kg)       Temp (24hrs), Av.1 °F (36.7 °C), Min:98 °F (36.7 °C), Max:98.3 °F (36.8 °C)      Physical Exam:  GENERAL: alert, cooperative, no distress, appears stated age, EYE: negative findings: anicteric sclera, THROAT & NECK: normal and mucous membranes dry, LUNG: clear to auscultation bilaterally, HEART: regular rate and rhythm, ABDOMEN: soft, mildly distended, nontender. Palpable suture under skin/scar in midline.   Stoma pink, some clear watery material in bag, EXTREMITIES:  extremities normal, atraumatic, no cyanosis or edema, SKIN: no rash or abnormalities, NEUROLOGIC: negative findings: alert, oriented x3  speech normal in context and clarity  memory intact grossly  cranial nerves II-XII intact, PSYCHIATRIC: non focal      Labs:   Recent Results (from the past 24 hour(s))   CBC WITH AUTOMATED DIFF    Collection Time: 05/11/21  8:12 AM   Result Value Ref Range    WBC 8.1 4.3 - 11.1 K/uL    RBC 4.28 4.23 - 5.6 M/uL    HGB 11.7 (L) 13.6 - 17.2 g/dL    HCT 37.6 (L) 41.1 - 50.3 %    MCV 87.9 79.6 - 97.8 FL    MCH 27.3 26.1 - 32.9 PG    MCHC 31.1 (L) 31.4 - 35.0 g/dL    RDW 13.6 11.9 - 14.6 %    PLATELET 548 491 - 047 K/uL    MPV 9.0 (L) 9.4 - 12.3 FL    ABSOLUTE NRBC 0.00 0.0 - 0.2 K/uL    DF AUTOMATED      NEUTROPHILS 88 (H) 43 - 78 %    LYMPHOCYTES 4 (L) 13 - 44 %    MONOCYTES 8 4.0 - 12.0 %    EOSINOPHILS 0 (L) 0.5 - 7.8 %    BASOPHILS 0 0.0 - 2.0 %    IMMATURE GRANULOCYTES 0 0.0 - 5.0 %    ABS. NEUTROPHILS 7.1 1.7 - 8.2 K/UL    ABS. LYMPHOCYTES 0.3 (L) 0.5 - 4.6 K/UL    ABS. MONOCYTES 0.6 0.1 - 1.3 K/UL    ABS. EOSINOPHILS 0.0 0.0 - 0.8 K/UL    ABS. BASOPHILS 0.0 0.0 - 0.2 K/UL    ABS. IMM. GRANS. 0.0 0.0 - 0.5 K/UL   METABOLIC PANEL, COMPREHENSIVE    Collection Time: 05/11/21  8:12 AM   Result Value Ref Range    Sodium 138 136 - 145 mmol/L    Potassium 4.2 3.5 - 5.1 mmol/L    Chloride 100 98 - 107 mmol/L    CO2 30 21 - 32 mmol/L    Anion gap 8 7 - 16 mmol/L    Glucose 131 (H) 65 - 100 mg/dL    BUN 28 (H) 8 - 23 MG/DL    Creatinine 1.19 0.8 - 1.5 MG/DL    GFR est AA >60 >60 ml/min/1.73m2    GFR est non-AA >60 >60 ml/min/1.73m2    Calcium 9.4 8.3 - 10.4 MG/DL    Bilirubin, total 0.4 0.2 - 1.1 MG/DL    ALT (SGPT) 24 12 - 65 U/L    AST (SGOT) 16 15 - 37 U/L    Alk.  phosphatase 91 50 - 136 U/L    Protein, total 8.0 6.3 - 8.2 g/dL    Albumin 3.3 3.2 - 4.6 g/dL    Globulin 4.7 (H) 2.3 - 3.5 g/dL    A-G Ratio 0.7 (L) 1.2 - 3.5     LIPASE    Collection Time: 05/11/21  8:12 AM   Result Value Ref Range    Lipase 111 73 - 393 U/L   CEA    Collection Time: 05/11/21  8:12 AM   Result Value Ref Range    CEA 1.6 0.0 - 3.0 ng/mL   URINE MICROSCOPIC    Collection Time: 05/11/21 12:48 PM   Result Value Ref Range    WBC >100 0 /hpf    RBC >100 0 /hpf    Epithelial cells 0-3 0 /hpf    Bacteria 4+ (H) 0 /hpf    Casts 0-3 0 /lpf    Crystals, urine 0 0 /LPF    Mucus 0 0 /lpf        Data Review reviewed  CT   CT Results (most recent):  Results from Hospital Encounter encounter on 05/11/21   CT ABD PELV W CONT    Narrative CT ABDOMEN AND PELVIS WITH CONTRAST    HISTORY: Restaging rectal carcinoma    COMPARISON: 8/31/2020    TECHNIQUE: Helical imaging was performed from the lung bases through the ischial  tuberosities during the intravenous infusion of 100 cc of Isovue-370. Oral  contrast was administered. Coronal and sagittal reformats were performed. Dose reduction techniques used: Automated exposure control, adjustment of the  mAs and/or kVp according to patient's size, and iterative reconstruction  techniques. FINDINGS:  *  LUNG BASES: Coronary artery disease. *  LIVER: Within normal limits. *  GALLBLADDER AND BILE DUCTS: Normal. Mild intrahepatic biliary ductal  dilatation. *  SPLEEN: Within normal limits. *  URINARY TRACT: Left renal atrophy. Bilateral hydronephrosis of uncertain  etiology. *  ADRENALS: Within normal limits. *  PANCREAS: Dilatation of the pancreatic duct. *  GASTROINTESTINAL TRACT: Low anterior colon resection. New enhancing mass with  necrotic center to the right of the rectum in the presacral space measuring 3.4  x 2.0 cm. Small bowel obstruction containing fecal lies material.  *  RETROPERITONEUM: Within normal limits. *  PERITONEAL CAVITY AND ABDOMINAL WALL: Within normal limits. *  PELVIS: Within normal limits. *  SPINE / BONES: Within normal limits. *  OTHER COMMENTS: None. Impression Findings worrisome for right presacral tumor recurrence. Consider obtaining a  PET/CT scan.     Small bowel obstruction containing fecal-like material. This is worse when  compared to 8/31/2020. Bilateral hydronephrosis of uncertain etiology. Was not present on the prior  study. Dilatation of the intrahepatic biliary ducts and pancreatic duct. This is more  prominent on the study secondary to the use of intravenous contrast. Correlation  with LFTs is recommended. Date of Dictation: 5/11/2021 10:10 AM            Assessment:     Mechanical SBO due to adhesions  H/o rectal cancer  Right hydroureter    Plan:     I have added CEA to the labs and it returned normal.      The SBO must be treated conservatively; he should not have abdominal surgery under essentially any circumstance given the known hostility of his abdomen. He has responded to conservative treatment in the past.    Given the normal CEA, pelvic recurrence of his disease as a cause of the mild hydroureter seems unlikely. Pelvic MRI may better elucidate the anatomy, or PET/CT as outpatient-- he may benefit from visit with oncology to further evaluate this. I do not know if urology should be involved to consider stenting or not- although creatinine is at baseline and kidney looks fine on CT. Discussed above with Tiffanie Contreras  Will follow.         Signed By: Marion Rangel MD     May 11, 2021

## 2021-05-11 NOTE — H&P
[employer] [facility] History and Physical       Name:  Mary Marroquin  Age:75 y.o. Sex:male   :  1945    MRN:  [id number]   PCP:  Katheran Saint, DO      Admit Date:  2021  8:00 AM   Chief Complaint: abdominal pain, vomiting, no out put from colostmy    Reason for Admission:   Small bowel obstruction ([address] [K56.609]    Assessment & Plan:   Small bowel obstruction- conservative management as per surgery  Npo, ivf, pain medications  UTI-urine culture, Rocephin. Bilateral hydronephrosis-no flank tenderness. Normal urination. Consult urology  ? recurrent rectal cancer- need out pt PET scan/ oncology consult  Dilated intrahepatic bile duct and the pancreatic duct-normal LFTs hence did not consult to GI. Anxiety/depression- hold those medication, scheduled ativan at night time. 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 male with medical history of history of rectal cancer in remission, colostomy, anxiety/depression, hypothyroidism, on medication for depression proctitis and multiple abdominal surgeries. Patient did not have any output from the colostomy for 24 hours, mild nausea yesterday and episode of vomiting last night and a cramping abdominal discomfort has decided to come to emergency room for further evaluation. Otherwise did not complain of any fever headache or dizziness or any chest pain or shortness of breath. Normal CBC, chemistry panel and normal CEA. CT abdomen pelvis with contrast-  Findings worrisome for right presacral tumor recurrence. Consider obtaining a  PET/CT scan.   Small bowel obstruction containing fecal-like material. This is worse when  compared to 8/31/2020. Bilateral hydronephrosis of uncertain etiology. Was not present on the prior  study. Dilatation of the intrahepatic biliary ducts and pancreatic duct. This is more  prominent on the study secondary to the use of intravenous contrast. Correlation  with LFTs is recommended. Patient will be admitted for further management of small bowel obstruction and UTI. Review of Systems:  A 14 point review of systems was taken and pertinent positive as per HPI.         Past Medical History:   Diagnosis Date    Acute deep vein thrombosis (DVT) of non-extremity vein 5/20/2019    Family history of malignant neoplasm of gastrointestinal tract     mother colon/ovarian cancer 67    Fecal incontinence     LAR syndrome    Former cigarette smoker     History of rectal cancer     Hypothyroid     stable w/med    Infection and inflammatory reaction due to other internal prosthetic devices, implants and grafts, subsequent encounter 2017    abd wound/mesh colostomy    Insomnia     takes meds    Major depression     Osteoarthritis, hand     Personal history of colonic polyps 9/2013    x 1    Personal history of malignant neoplasm of rectum, rectosigmoid junction, and anus 9/2013    Psychiatric disorder     anxiety       Past Surgical History:   Procedure Laterality Date    COLONOSCOPY N/A 2/12/2018    COLONOSCOPY / BMI=21 performed by Morgna Nolen MD at [facility] N/A 3/5/2021    COLONOSCOPY/BMI 19 performed by Filiberto Hinojosa MD at [address]  2/12/2018         COLONOSCOPY THRU STOMA,LILLIAN RMVPAYAM W/SNARE  3/5/2021         ENDOSCOPY, COLON, DIAGNOSTIC  last 2/3/15    Emily--no polyps--3 year recall    HX COLOSTOMY  5/11/16    HX GI  4/2016    Sacral nerve stimlator lead trial (unsucessful) and removal    HX HERNIA REPAIR  3/2015, 5/2016    HX HERNIA REPAIR      and Colostomy in May    HX OTHER SURGICAL  10/7/2013    Emily---endorectal us    HX OTHER SURGICAL Bilateral     cataracts  2017    HX POLYPECTOMY      HX TOTAL COLECTOMY  2013    Emily--lap LAR with coloproctostomy, mobilization splenic flexure, diverting loop ileostomy    HX TOTAL COLECTOMY Right 2014    for leak    HX VASCULAR ACCESS Left     single lumen port/subsequently removed    IR INSERT TUNL CVC W/O PORT OVER 5 YR  2019    IR REPLACE CVC TUNNELED W/O PORT  2019       Family History : reviewed  Family History   Problem Relation Age of Onset    Cancer Mother         colon and ovarian    Cancer Brother         prostate    Alcohol abuse Brother     Cancer Maternal Grandmother         bone    Alcohol abuse Father     Alcohol abuse Sister     Stroke Paternal Grandfather         Social History     Tobacco Use    Smoking status: Former Smoker     Packs/day: 1.00     Years: 4.00     Pack years: 4.00     Types: Cigarettes     Quit date: 1963     Years since quittin.5    Smokeless tobacco: Never Used   Substance Use Topics    Alcohol use: Not Currently     Alcohol/week: 11.7 standard drinks     Types: 14 Cans of beer per week     Comment: advised stop drinking given issues       No Known Allergies    Immunization History   Administered Date(s) Administered    COVID-19, PFIZER, MRNA, LNP-S, PF, 30MCG/0.3ML DOSE 02/15/2021, 2021    TB Skin Test (PPD) Intradermal 2014, 2014, 2014, 2015, 2016, 2019, 2019         PTA Medications:  Current Outpatient Medications   Medication Instructions    amitriptyline (ELAVIL) 100 mg, Oral, EVERY BEDTIME, Dr Hussein Bobo psych    cholecalciferol (VITAMIN D3) 2,000 Units, Oral, 2 TIMES DAILY    clonazePAM (KLONOPIN) 4 mg, Oral, EVERY BEDTIME    levothyroxine (SYNTHROID) 125 mcg tablet TAKE ONE TABLET BY MOUTH ONE TIME DAILY BEFORE BREAKFAST    mesalamine (ROWASA) 4 g, Rectal, EVERY OTHER DAY    OLANZapine (ZYPREXA) 7.5 mg, Oral, EVERY BEDTIME       Objective:     Patient Vitals for the past 24 hrs:   Temp Pulse Resp BP SpO2   05/11/21 1529 98.3 °F (36.8 °C) 81 18 (!) 151/79 98 %   05/11/21 1325 98 °F (36.7 °C) 94 18 (!) 154/81 96 %   05/11/21 0900    126/76 96 %   05/11/21 0857     97 %   05/11/21 0806    104/75    05/11/21 0804 98.1 °F (36.7 °C) (!) 109 18  96 %       Oxygen Therapy  O2 Sat (%): 98 % (05/11/21 1529)  Pulse via Oximetry: 84 beats per minute (05/11/21 0900)  O2 Device: None (Room air) (05/11/21 1529)    Body mass index is 17.49 kg/m². Physical Exam:    General:  No acute distress, speaking in full sentences  HEENT:  Pupils equal and reactive to light and accommodation, oropharynx is clear   Neck:   Supple, no lymphadenopathy, no JVD   Lungs:  Clear to auscultation bilaterally   CV:   Regular rate and rhythm with normal S1 and S2   Abdomen:  Mild rt abdomen tenderness, no rebound, colostomy in place. Normal bs+  Extremities:  No cyanosis clubbing or edema   Neuro:  Nonfocal, A&O x3   Psych:  Normal mood and affect       Data Reviewed: I have reviewed all labs, meds, and studies. Recent Results (from the past 24 hour(s))   CBC WITH AUTOMATED DIFF    Collection Time: 05/11/21  8:12 AM   Result Value Ref Range    WBC 8.1 4.3 - 11.1 K/uL    RBC 4.28 4.23 - 5.6 M/uL    HGB 11.7 (L) 13.6 - 17.2 g/dL    HCT 37.6 (L) 41.1 - 50.3 %    MCV 87.9 79.6 - 97.8 FL    MCH 27.3 26.1 - 32.9 PG    MCHC 31.1 (L) 31.4 - 35.0 g/dL    RDW 13.6 11.9 - 14.6 %    PLATELET 042 485 - 184 K/uL    MPV 9.0 (L) 9.4 - 12.3 FL    ABSOLUTE NRBC 0.00 0.0 - 0.2 K/uL    DF AUTOMATED      NEUTROPHILS 88 (H) 43 - 78 %    LYMPHOCYTES 4 (L) 13 - 44 %    MONOCYTES 8 4.0 - 12.0 %    EOSINOPHILS 0 (L) 0.5 - 7.8 %    BASOPHILS 0 0.0 - 2.0 %    IMMATURE GRANULOCYTES 0 0.0 - 5.0 %    ABS. NEUTROPHILS 7.1 1.7 - 8.2 K/UL    ABS. LYMPHOCYTES 0.3 (L) 0.5 - 4.6 K/UL    ABS. MONOCYTES 0.6 0.1 - 1.3 K/UL    ABS. EOSINOPHILS 0.0 0.0 - 0.8 K/UL    ABS. BASOPHILS 0.0 0.0 - 0.2 K/UL    ABS. IMM.  GRANS. 0.0 0.0 - 0.5 K/UL   METABOLIC PANEL, COMPREHENSIVE    Collection Time: 05/11/21  8:12 AM   Result Value Ref Range    Sodium 138 136 - 145 mmol/L    Potassium 4.2 3.5 - 5.1 mmol/L    Chloride 100 98 - 107 mmol/L    CO2 30 21 - 32 mmol/L    Anion gap 8 7 - 16 mmol/L    Glucose 131 (H) 65 - 100 mg/dL    BUN 28 (H) 8 - 23 MG/DL    Creatinine 1.19 0.8 - 1.5 MG/DL    GFR est AA >60 >60 ml/min/1.73m2    GFR est non-AA >60 >60 ml/min/1.73m2    Calcium 9.4 8.3 - 10.4 MG/DL    Bilirubin, total 0.4 0.2 - 1.1 MG/DL    ALT (SGPT) 24 12 - 65 U/L    AST (SGOT) 16 15 - 37 U/L    Alk. phosphatase 91 50 - 136 U/L    Protein, total 8.0 6.3 - 8.2 g/dL    Albumin 3.3 3.2 - 4.6 g/dL    Globulin 4.7 (H) 2.3 - 3.5 g/dL    A-G Ratio 0.7 (L) 1.2 - 3.5     LIPASE    Collection Time: 05/11/21  8:12 AM   Result Value Ref Range    Lipase 111 73 - 393 U/L   CEA    Collection Time: 05/11/21  8:12 AM   Result Value Ref Range    CEA 1.6 0.0 - 3.0 ng/mL   URINE MICROSCOPIC    Collection Time: 05/11/21 12:48 PM   Result Value Ref Range    WBC >100 0 /hpf    RBC >100 0 /hpf    Epithelial cells 0-3 0 /hpf    Bacteria 4+ (H) 0 /hpf    Casts 0-3 0 /lpf    Crystals, urine 0 0 /LPF    Mucus 0 0 /lpf       EKG Results     None          All Micro Results     Procedure Component Value Units Date/Time    CULTURE, URINE [[id number]] Collected: 05/11/21 9348    Order Status: Completed Specimen: Cath Urine Updated: 05/11/21 0523          Other Studies:  Ct Abd Pelv W Cont    Result Date: 5/11/2021  CT ABDOMEN AND PELVIS WITH CONTRAST HISTORY: Restaging rectal carcinoma COMPARISON: 8/59/5320 TECHNIQUE: Helical imaging was performed from the lung bases through the ischial tuberosities during the intravenous infusion of 100 cc of Isovue-370. Oral contrast was administered. Coronal and sagittal reformats were performed. Dose reduction techniques used:  Automated exposure control, adjustment of the mAs and/or kVp according to patient's size, and iterative reconstruction techniques. FINDINGS: *  LUNG BASES: Coronary artery disease. *  LIVER: Within normal limits. *  GALLBLADDER AND BILE DUCTS: Normal. Mild intrahepatic biliary ductal dilatation. *  SPLEEN: Within normal limits. *  URINARY TRACT: Left renal atrophy. Bilateral hydronephrosis of uncertain etiology. *  ADRENALS: Within normal limits. *  PANCREAS: Dilatation of the pancreatic duct. *  GASTROINTESTINAL TRACT: Low anterior colon resection. New enhancing mass with necrotic center to the right of the rectum in the presacral space measuring 3.4 x 2.0 cm. Small bowel obstruction containing fecal lies material. *  RETROPERITONEUM: Within normal limits. *  PERITONEAL CAVITY AND ABDOMINAL WALL: Within normal limits. *  PELVIS: Within normal limits. *  SPINE / BONES: Within normal limits. *  OTHER COMMENTS: None. 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 Consider obtaining a PET/CT scan. Small bowel obstruction containing fecal-like material. This is worse when compared to 8/31/2020. Bilateral hydronephrosis of uncertain etiology. Was not present on the prior study. Dilatation of the intrahepatic biliary ducts and pancreatic duct. This is more prominent on the study secondary to the use of intravenous contrast. Correlation with LFTs is recommended.  Date of Dictation: 5/11/2021 10:10 AM         Medications:  Medications Administered      Medications Administered     0.9% sodium chloride infusion     Admin Date  05/11/2021 Action  New Bag Dose  100 mL/hr Rate  100 mL/hr Route  IntraVENous Administered By  Michela Goldberg RN           Admin Date  05/11/2021 Action  New Bag Dose  100 mL/hr Rate  100 mL/hr Route  IntraVENous Administered By  Michela Goldberg RN          cefTRIAXone (ROCEPHIN) 1 g in 0.9% sodium chloride (MBP/ADV) 50 mL MBP     Admin Date  05/11/2021 Action  New Bag Dose  1 g Rate  100 mL/hr Route  IntraVENous Administered By  Michela Goldberg RN          diatrizoate gareth-diatrizoat sod (MD-GASTROVIEW,GASTROGRAFIN) 66-10 % contrast solution 15 mL     Admin Date  05/11/2021 Action  Given Dose  15 mL Route  Oral Administered By  Anneliese Sapp RN          famotidine (PF) (PEPCID) 20 mg in 0.9% sodium chloride 10 mL injection     Admin Date  05/11/2021 Action  Given Dose  20 mg Route  IntraVENous Administered By  Shar Kelley RN          iopamidoL (ISOVUE-370) 76 % injection 100 mL     Admin Date  05/11/2021 Action  Given Dose  100 mL Route  IntraVENous Administered By  Bayron Huber, RT, R, CT          morphine injection 4 mg     Admin Date  05/11/2021 Action  Given Dose  4 mg Route  IntraVENous Administered By  Anneliese Sapp RN          ondansetron [facility]) injection 4 mg     Admin Date  05/11/2021 Action  Given Dose  4 mg Route  IntraVENous Administered By  Anneliese Sapp RN          saline peripheral flush soln 10 mL     Admin Date  05/11/2021 Action  Given Dose  10 mL Route  InterCATHeter Administered By  Bayron Huber, RT, R, CT          sodium chloride (NS) flush 5-40 mL     Admin Date  05/11/2021 Action  Given Dose  10 mL Route  IntraVENous Administered By  Myron Cason RN          sodium chloride 0.9 % bolus infusion 1,000 mL     Admin Date  05/11/2021 Action  New Bag Dose  1,000 mL Rate  1,000 mL/hr Route  IntraVENous Administered By  Anneliese Sapp RN          sodium chloride 0.9 % bolus infusion 100 mL     Admin Date  05/11/2021 Action  New Bag Dose  100 mL Rate   Route  IntraVENous Administered By  Bayron Huber, RT, R, CT                      Problem List:     Hospital Problems as of 5/11/2021 Date Reviewed: 12/10/2020          Codes Class Noted - Resolved POA    Anxiety ICD-10-CM: F41.9  ICD-9-CM: 300.00  5/11/2021 - Present Unknown        Depression ICD-10-CM: F32.9  ICD-9-CM: 105  5/11/2021 - Present Unknown        UTI (urinary tract infection) ICD-10-CM: N39.0  ICD-9-CM: 599.0  5/11/2021 - Present Unknown        Bilateral hydronephrosis ICD-10-CM: N13.30  ICD-9-CM: 438 5/11/2021 - Present Unknown        Dilated intrahepatic bile duct ICD-10-CM: K83.8  ICD-9-CM: 576.8  5/11/2021 - Present Unknown        Pancreatic duct dilated ICD-10-CM: K86.89  ICD-9-CM: 577.8  5/11/2021 - Present Unknown        Hypothyroid ICD-10-CM: E03.9  ICD-9-CM: 244.9  6/14/2019 - Present Yes    Overview Signed 6/14/2019 12:12 AM by Jordan Zavala MD     stable w/med             Small bowel obstruction [facility]) ICD-10-CM: M81.100  ICD-9-CM: 560.9  3/30/2019 - Present Unknown                 Signed By: Dandre Schmitz MD   [address] [place], [place] [address]ist Service    May 11, 2021

## 2021-05-11 NOTE — ED PROVIDER NOTES
Patient is a 64-year-old male with history of prior rectal cancer status post colectomy who comes to the emergency department today complaining of crampy abdominal pain which started yesterday. One bout of vomiting. No output in his colostomy this morning. The history is provided by the patient. Abdominal Pain   This is a new problem. The current episode started yesterday. The problem occurs constantly. The problem has not changed since onset. The pain is associated with an unknown factor. The pain is located in the generalized abdominal region. The quality of the pain is cramping. The pain is moderate. Associated symptoms include nausea and vomiting. Pertinent negatives include no fever, no diarrhea, no melena, no dysuria, no frequency, no trauma, no chest pain and no back pain. Nothing worsens the pain. The pain is relieved by nothing. Past workup includes CT scan. His past medical history is significant for small bowel obstruction. His past medical history does not include pancreatitis or kidney stones. The patient's surgical history includes colectomy.        Past Medical History:   Diagnosis Date    Acute deep vein thrombosis (DVT) of non-extremity vein 5/20/2019    Family history of malignant neoplasm of gastrointestinal tract     mother colon/ovarian cancer 67    Fecal incontinence     LAR syndrome    Former cigarette smoker     History of rectal cancer     Hypothyroid     stable w/med    Infection and inflammatory reaction due to other internal prosthetic devices, implants and grafts, subsequent encounter 2017    abd wound/mesh colostomy    Insomnia     takes meds    Major depression     Osteoarthritis, hand     Personal history of colonic polyps 9/2013    x 1    Personal history of malignant neoplasm of rectum, rectosigmoid junction, and anus 9/2013    Psychiatric disorder     anxiety       Past Surgical History:   Procedure Laterality Date    COLONOSCOPY N/A 2/12/2018    COLONOSCOPY / BMI=21 [facility] P[facility]ila Dennis MD [facility] ENDOSCOPY    COLONOSCOPY N/A 3/5/2021    COLONOSCOPY/BMI 19 performed by Andre Murray MD at [address] [place] [address]  2/12/2018         COLONOSCOPY THRU STOMA,LILLIAN RMVL W/SNARE  3/5/2021         ENDOSCOPY, COLON, DIAGNOSTIC  last 2/3/15    Emily--no polyps--3 year recall    HX COLOSTOMY  5/11/16    HX GI  4/2016    Sacral nerve stimlator lead trial (unsucessful) and removal    HX HERNIA REPAIR  3/2015, 5/2016    HX HERNIA REPAIR      and Colostomy in May    HX OTHER SURGICAL  10/7/2013    Emily---endorectal us    HX OTHER SURGICAL Bilateral     cataracts  2017    HX POLYPECTOMY      HX TOTAL COLECTOMY  11/2013    Emily--lap LAR with coloproctostomy, mobilization splenic flexure, diverting loop ileostomy    HX TOTAL COLECTOMY Right 8/2014    for leak    HX VASCULAR ACCESS Left 4/14    single lumen port/subsequently removed    IR INSERT TUNL CVC W/O PORT OVER 5 YR  5/20/2019    IR REPLACE CVC TUNNELED W/O PORT  5/30/2019         Family History:   Problem Relation Age of Onset    Cancer Mother         colon and ovarian    Cancer Brother         prostate    Alcohol abuse Brother     Cancer Maternal Grandmother         bone    Alcohol abuse Father     Alcohol abuse Sister     Stroke Paternal Grandfather        Social History     Socioeconomic History    Marital status: SINGLE     Spouse name: Not on file    Number of children: Not on file    Years of education: Not on file    Highest education level: Not on file   Occupational History    Not on file   Social Needs    Financial resource strain: Not on file    Food insecurity     Worry: Not on file     Inability: Not on file    Transportation needs     Medical: Not on file     Non-medical: Not on file   Tobacco Use    Smoking status: Former Smoker     Packs/day: 1.00     Years: 4.00     Pack years: 4.00     Types: Cigarettes     Quit date: 11/16/1963     Years since quittin.5    Smokeless tobacco: Never Used   Substance and Sexual Activity    Alcohol use: Not Currently     Alcohol/week: 11.7 standard drinks     Types: 14 Cans of beer per week     Comment: advised stop drinking given issues    Drug use: No    Sexual activity: Never   Lifestyle    Physical activity     Days per week: Not on file     Minutes per session: Not on file    Stress: Not on file   Relationships    Social connections     Talks on phone: Not on file     Gets together: Not on file     Attends Judaism service: Not on file     Active member of club or organization: Not on file     Attends meetings of clubs or organizations: Not on file     Relationship status: Not on file    Intimate partner violence     Fear of current or ex partner: Not on file     Emotionally abused: Not on file     Physically abused: Not on file     Forced sexual activity: Not on file   Other Topics Concern    Not on file   Social History Narrative    Not on file         ALLERGIES: Patient has no known allergies. Review of Systems   Constitutional: Negative for chills, fatigue and fever. HENT: Negative for congestion, rhinorrhea and sore throat. Eyes: Negative for pain, discharge and visual disturbance. Respiratory: Negative for cough and shortness of breath. Cardiovascular: Negative for chest pain and palpitations. Gastrointestinal: Positive for abdominal pain, nausea and vomiting. Negative for diarrhea and melena. Endocrine: Negative for polydipsia and polyuria. Genitourinary: Negative for dysuria, frequency and urgency. Musculoskeletal: Negative for back pain and neck pain. Skin: Negative for rash. Neurological: Negative for seizures, syncope and weakness. Hematological: Negative.         Vitals:    21 0804 21 0806   BP:  104/75   Pulse: (!) 109    Resp: 18    Temp: 98.1 °F (36.7 °C)    SpO2: 96%    Weight: 52.2 kg (115 lb)    Height: 5' 8\" (1.727 m)             Physical Exam  Vitals signs and nursing note reviewed. Constitutional:       Appearance: Normal appearance. He is well-developed. He is ill-appearing. HENT:      Head: Normocephalic and atraumatic. Nose: Nose normal.   Eyes:      Extraocular Movements: Extraocular movements intact. Conjunctiva/sclera: Conjunctivae normal.      Pupils: Pupils are equal, round, and reactive to light. Neck:      Musculoskeletal: Normal range of motion and neck supple. Cardiovascular:      Rate and Rhythm: Regular rhythm. Tachycardia present. Heart sounds: Normal heart sounds. Pulmonary:      Effort: Pulmonary effort is normal.      Breath sounds: Normal breath sounds. Abdominal:      Palpations: Abdomen is soft. Tenderness: There is generalized abdominal tenderness. There is no guarding or rebound. Hernia: A hernia is present. Hernia is present in the ventral area. Comments: Colostomy in lower abdomen   Musculoskeletal: Normal range of motion. General: No tenderness. Lymphadenopathy:      Cervical: No cervical adenopathy. Skin:     General: Skin is warm and dry. Coloration: Skin is pale. Findings: No rash. Neurological:      General: No focal deficit present. Mental Status: He is alert and oriented to person, place, and time. GCS: GCS eye subscore is 4. GCS verbal subscore is 5. GCS motor subscore is 6. Cranial Nerves: No cranial nerve deficit. Sensory: No sensory deficit. Motor: Motor function is intact. MDM  Number of Diagnoses or Management Options  Diagnosis management comments: I wore appropriate PPE throughout this patient's ED visit. Ryan Belle MD, 8:17 AM    10:32 AM  Labs unremarkable  CT ABD PELV W CONT   Final Result        Findings worrisome for right presacral tumor recurrence. Consider obtaining a    PET/CT scan. Small bowel obstruction containing fecal-like material. This is worse when    compared to 8/31/2020. Bilateral hydronephrosis of uncertain etiology. Was not present on the prior    study. Dilatation of the intrahepatic biliary ducts and pancreatic duct. This is more    prominent on the study secondary to the use of intravenous contrast. Correlation    with LFTs is recommended. Date of Dictation: 5/11/2021 10:10 AM             CT scan shows a bowel obstruction and possible recurrence of tumor. I did discuss with the nurse practitioner on-call for general surgery she will notify Dr. Marsha Grace as he is taken care of this patient in the past.  They request hospitalist admit for general surgery will consult. Voice dictation software was used during the making of this note. This software is not perfect and grammatical and other typographical errors may be present. This note has been proofread, but may still contain errors.   Obed Gross MD; 5/11/2021 @10:35 AM   ===================================================================           Amount and/or Complexity of Data Reviewed  Clinical lab tests: ordered and reviewed  Tests in the radiology section of CPT®: ordered and reviewed  Review and summarize past medical records: yes  Independent visualization of images, tracings, or specimens: yes    Risk of Complications, Morbidity, and/or Mortality  Presenting problems: moderate  Diagnostic procedures: moderate  Management options: low    Patient Progress  Patient progress: stable         Procedures

## 2021-05-11 NOTE — ED TRIAGE NOTES
Pt to ED with c/o abdominal pain. Pt has a colostomy and states \"my small intestine shut down last night\". Pt states he's had no output into his colostomy. Pt advises poor sleep last night and vomiting. Masked.

## 2021-05-12 LAB
ANION GAP SERPL CALC-SCNC: 6 MMOL/L (ref 7–16)
BASOPHILS # BLD: 0 K/UL (ref 0–0.2)
BASOPHILS NFR BLD: 0 % (ref 0–2)
BUN SERPL-MCNC: 24 MG/DL (ref 8–23)
CALCIUM SERPL-MCNC: 8.5 MG/DL (ref 8.3–10.4)
CHLORIDE SERPL-SCNC: 105 MMOL/L (ref 98–107)
CO2 SERPL-SCNC: 31 MMOL/L (ref 21–32)
CREAT SERPL-MCNC: 0.86 MG/DL (ref 0.8–1.5)
DIFFERENTIAL METHOD BLD: ABNORMAL
EOSINOPHIL # BLD: 0 K/UL (ref 0–0.8)
EOSINOPHIL NFR BLD: 0 % (ref 0.5–7.8)
ERYTHROCYTE [DISTWIDTH] IN BLOOD BY AUTOMATED COUNT: 13.6 % (ref 11.9–14.6)
GLUCOSE SERPL-MCNC: 87 MG/DL (ref 65–100)
HCT VFR BLD AUTO: 33.3 % (ref 41.1–50.3)
HGB BLD-MCNC: 10.3 G/DL (ref 13.6–17.2)
IMM GRANULOCYTES # BLD AUTO: 0 K/UL (ref 0–0.5)
IMM GRANULOCYTES NFR BLD AUTO: 1 % (ref 0–5)
LYMPHOCYTES # BLD: 0.3 K/UL (ref 0.5–4.6)
LYMPHOCYTES NFR BLD: 4 % (ref 13–44)
MCH RBC QN AUTO: 27.5 PG (ref 26.1–32.9)
MCHC RBC AUTO-ENTMCNC: 30.9 G/DL (ref 31.4–35)
MCV RBC AUTO: 88.8 FL (ref 79.6–97.8)
MONOCYTES # BLD: 0.7 K/UL (ref 0.1–1.3)
MONOCYTES NFR BLD: 8 % (ref 4–12)
NEUTS SEG # BLD: 7.6 K/UL (ref 1.7–8.2)
NEUTS SEG NFR BLD: 87 % (ref 43–78)
NRBC # BLD: 0 K/UL (ref 0–0.2)
PLATELET # BLD AUTO: 303 K/UL (ref 150–450)
PMV BLD AUTO: 8.8 FL (ref 9.4–12.3)
POTASSIUM SERPL-SCNC: 3.7 MMOL/L (ref 3.5–5.1)
RBC # BLD AUTO: 3.75 M/UL (ref 4.23–5.6)
SODIUM SERPL-SCNC: 142 MMOL/L (ref 136–145)
WBC # BLD AUTO: 8.7 K/UL (ref 4.3–11.1)

## 2021-05-12 PROCEDURE — 74011250637 HC RX REV CODE- 250/637: Performed by: HOSPITALIST

## 2021-05-12 PROCEDURE — 85025 COMPLETE CBC W/AUTO DIFF WBC: CPT

## 2021-05-12 PROCEDURE — APPSS180 APP SPLIT SHARED TIME > 60 MINUTES: Performed by: NURSE PRACTITIONER

## 2021-05-12 PROCEDURE — 74011250636 HC RX REV CODE- 250/636: Performed by: FAMILY MEDICINE

## 2021-05-12 PROCEDURE — 80048 BASIC METABOLIC PNL TOTAL CA: CPT

## 2021-05-12 PROCEDURE — 65270000029 HC RM PRIVATE

## 2021-05-12 PROCEDURE — 74011000250 HC RX REV CODE- 250: Performed by: HOSPITALIST

## 2021-05-12 PROCEDURE — 74011250637 HC RX REV CODE- 250/637: Performed by: FAMILY MEDICINE

## 2021-05-12 PROCEDURE — 2709999900 HC NON-CHARGEABLE SUPPLY

## 2021-05-12 PROCEDURE — 36415 COLL VENOUS BLD VENIPUNCTURE: CPT

## 2021-05-12 PROCEDURE — 74011250636 HC RX REV CODE- 250/636: Performed by: HOSPITALIST

## 2021-05-12 PROCEDURE — 99222 1ST HOSP IP/OBS MODERATE 55: CPT | Performed by: INTERNAL MEDICINE

## 2021-05-12 PROCEDURE — 99221 1ST HOSP IP/OBS SF/LOW 40: CPT | Performed by: UROLOGY

## 2021-05-12 PROCEDURE — 74011000258 HC RX REV CODE- 258: Performed by: HOSPITALIST

## 2021-05-12 RX ORDER — ENOXAPARIN SODIUM 100 MG/ML
40 INJECTION SUBCUTANEOUS DAILY
Status: CANCELLED | OUTPATIENT
Start: 2021-05-13

## 2021-05-12 RX ORDER — DEXTROSE MONOHYDRATE AND SODIUM CHLORIDE 5; .45 G/100ML; G/100ML
75 INJECTION, SOLUTION INTRAVENOUS CONTINUOUS
Status: DISCONTINUED | OUTPATIENT
Start: 2021-05-12 | End: 2021-05-13

## 2021-05-12 RX ORDER — ONDANSETRON 2 MG/ML
4 INJECTION INTRAMUSCULAR; INTRAVENOUS
Status: CANCELLED | OUTPATIENT
Start: 2021-05-12

## 2021-05-12 RX ORDER — CLONAZEPAM 1 MG/1
6 TABLET ORAL
Status: CANCELLED | OUTPATIENT
Start: 2021-05-12

## 2021-05-12 RX ORDER — LEVOTHYROXINE SODIUM 125 UG/1
125 TABLET ORAL
Status: CANCELLED | OUTPATIENT
Start: 2021-05-13

## 2021-05-12 RX ORDER — SODIUM CHLORIDE 0.9 % (FLUSH) 0.9 %
5-40 SYRINGE (ML) INJECTION AS NEEDED
Status: CANCELLED | OUTPATIENT
Start: 2021-05-12

## 2021-05-12 RX ORDER — AMITRIPTYLINE HYDROCHLORIDE 25 MG/1
100 TABLET, FILM COATED ORAL
Status: CANCELLED | OUTPATIENT
Start: 2021-05-12

## 2021-05-12 RX ORDER — CLONAZEPAM 1 MG/1
6 TABLET ORAL
Status: DISCONTINUED | OUTPATIENT
Start: 2021-05-12 | End: 2021-05-15 | Stop reason: HOSPADM

## 2021-05-12 RX ORDER — MORPHINE SULFATE 2 MG/ML
0.5 INJECTION, SOLUTION INTRAMUSCULAR; INTRAVENOUS
Status: CANCELLED | OUTPATIENT
Start: 2021-05-12

## 2021-05-12 RX ORDER — ACETAMINOPHEN 650 MG/1
650 SUPPOSITORY RECTAL
Status: CANCELLED | OUTPATIENT
Start: 2021-05-12

## 2021-05-12 RX ORDER — SODIUM CHLORIDE 0.9 % (FLUSH) 0.9 %
5-40 SYRINGE (ML) INJECTION EVERY 8 HOURS
Status: CANCELLED | OUTPATIENT
Start: 2021-05-12

## 2021-05-12 RX ORDER — LEVOTHYROXINE SODIUM 125 UG/1
125 TABLET ORAL
Status: DISCONTINUED | OUTPATIENT
Start: 2021-05-12 | End: 2021-05-15 | Stop reason: HOSPADM

## 2021-05-12 RX ORDER — MESALAMINE 1000 MG/1
1 SUPPOSITORY RECTAL EVERY 12 HOURS
Status: CANCELLED | OUTPATIENT
Start: 2021-05-12

## 2021-05-12 RX ORDER — CLONAZEPAM 1 MG/1
2 TABLET ORAL
Status: DISCONTINUED | OUTPATIENT
Start: 2021-05-12 | End: 2021-05-12

## 2021-05-12 RX ORDER — LORAZEPAM 2 MG/ML
1 INJECTION INTRAMUSCULAR ONCE
Status: COMPLETED | OUTPATIENT
Start: 2021-05-12 | End: 2021-05-12

## 2021-05-12 RX ORDER — LEVOTHYROXINE SODIUM 125 UG/1
125 TABLET ORAL
Status: DISCONTINUED | OUTPATIENT
Start: 2021-05-13 | End: 2021-05-12

## 2021-05-12 RX ORDER — DEXTROSE MONOHYDRATE AND SODIUM CHLORIDE 5; .45 G/100ML; G/100ML
75 INJECTION, SOLUTION INTRAVENOUS CONTINUOUS
Status: CANCELLED | OUTPATIENT
Start: 2021-05-12

## 2021-05-12 RX ADMIN — Medication 1 AMPULE: at 08:38

## 2021-05-12 RX ADMIN — OLANZAPINE 7.5 MG: 5 TABLET, FILM COATED ORAL at 21:15

## 2021-05-12 RX ADMIN — FAMOTIDINE 20 MG: 10 INJECTION, SOLUTION INTRAVENOUS at 14:09

## 2021-05-12 RX ADMIN — CEFTRIAXONE 1 G: 1 INJECTION, POWDER, FOR SOLUTION INTRAMUSCULAR; INTRAVENOUS at 19:15

## 2021-05-12 RX ADMIN — CLONAZEPAM 6 MG: 1 TABLET ORAL at 21:12

## 2021-05-12 RX ADMIN — Medication 10 ML: at 06:03

## 2021-05-12 RX ADMIN — Medication 10 ML: at 21:19

## 2021-05-12 RX ADMIN — Medication 1 AMPULE: at 21:14

## 2021-05-12 RX ADMIN — LEVOTHYROXINE SODIUM 125 MCG: 0.12 TABLET ORAL at 10:52

## 2021-05-12 RX ADMIN — SODIUM CHLORIDE 100 ML/HR: 9 INJECTION, SOLUTION INTRAVENOUS at 00:35

## 2021-05-12 RX ADMIN — MESALAMINE 1000 MG: 1000 SUPPOSITORY RECTAL at 08:34

## 2021-05-12 RX ADMIN — Medication 10 ML: at 14:10

## 2021-05-12 RX ADMIN — MESALAMINE 1000 MG: 1000 SUPPOSITORY RECTAL at 21:40

## 2021-05-12 RX ADMIN — LORAZEPAM 1 MG: 2 INJECTION INTRAMUSCULAR; INTRAVENOUS at 06:09

## 2021-05-12 RX ADMIN — ONDANSETRON 4 MG: 2 INJECTION INTRAMUSCULAR; INTRAVENOUS at 05:25

## 2021-05-12 RX ADMIN — ENOXAPARIN SODIUM 40 MG: 40 INJECTION SUBCUTANEOUS at 08:38

## 2021-05-12 RX ADMIN — DEXTROSE MONOHYDRATE AND SODIUM CHLORIDE 75 ML/HR: 5; .45 INJECTION, SOLUTION INTRAVENOUS at 10:34

## 2021-05-12 RX ADMIN — AMITRIPTYLINE HYDROCHLORIDE 100 MG: 25 TABLET, FILM COATED ORAL at 21:13

## 2021-05-12 NOTE — PROGRESS NOTES
END OF SHIFT NOTE:  - ativan given x1 for restlessness this am  - psych meds given as directed  - BP trending up   - Colostomy bag emptied x3 per pt and stool loose per pt  -no c/o of abd pain  - pt resting quietly and stable at this time, no needs voiced      Intake/Output  No intake/output data recorded. Voiding: YES  Catheter: NO  Drain:              Stool:  0 occurrences. Stool Assessment  Stool Appearance: Loose (05/11/21 1940)    Emesis:  0 occurrences. VITAL SIGNS  Patient Vitals for the past 12 hrs:   Temp Pulse Resp BP SpO2   05/12/21 0230 98.6 °F (37 °C) 87 18 (!) 147/81 96 %   05/11/21 2230 98.1 °F (36.7 °C) 92 18 134/80 97 %   05/11/21 1940 98.1 °F (36.7 °C) 75 18 (!) 155/80 96 %       Pain Assessment  Pain 1  Pain Scale 1: Visual (05/12/21 0357)  Pain Intensity 1: 0 (05/12/21 0357)  Patient Stated Pain Goal: 0 (05/12/21 0357)    Ambulating  Yes    Additional Information:     Shift report given to oncoming nurse at the bedside.     Dacia Murphy RN

## 2021-05-12 NOTE — PROGRESS NOTES
[facility] Hospitalist Progress Note     Name:  Iraida Mclean  Age:75 y.o. Sex:male   :  1945    MRN:  [id number]     Admit Date:  2021    Reason for Admission:  Small bowel obstruction [facility]) [address] [place] [address] Course/Interval history:     Patient is a 41-year-old male with past medical history significant for rectal cancer, colostomy, anxiety/depression, hypothyroidism, depression proctitis, multiple abdominal surgeries, presented to the ER because of nausea vomiting and cramping abdominal pain. CT abdomen and pelvis with contrast concerning for right presacral tumor recurrence and small bowel obstruction. Dilatation of intrahepatic biliary ducts and pancreatic duct also noticed. General surgery consulted. Oncology consulted. Patient has bilateral hydronephrosis therefore urology consulted. 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 Subjective (21): Patient reports improved stool output in the colostomy. No fever no chills. Nausea vomiting improved. Abdominal pain improved. Asking about food. General surgery recommended sips of fluids. Review of Systems: 14 point review of systems is otherwise negative with the exception of the elements mentioned above. Assessment & Plan     This is a 41-year-old male with    Small bowel obstruction present on admission  Conservative management per general surgery. Sips of fluids started. Urinary tract infection  Urine cultures pending. Continue ceftriaxone. Bilateral hydronephrosis  Renal function within normal limits. Urology consulted. Appreciate recommendations. ?  Recurrent rectal cancer  Oncology consulted. IR consulted for biopsy of the presacral area. Dilated intrahepatic bile duct and pancreatic ducts  LFTs within normal limits. Anxiety/depression  Continue home medications. Hypothyroidism  Levothyroxine    History of diversion proctitis  Mesalamine suppository.     Diet: None  DVT PPx: Lovenox  Code status: Full Code  Disposition/Expected LOS: Anticipate inpatient hospital stay for at least 24-48 hours      Objective:     Patient Vitals for the past 24 hrs:   Temp Pulse Resp BP SpO2   05/12/21 1110 97.6 °F (36.4 °C) 84 18 127/68 97 %   05/12/21 0810 98.1 °F (36.7 °C) 81 20 134/74 93 %   05/12/21 0230 98.6 °F (37 °C) 87 18 (!) 147/81 96 %   05/11/21 2230 98.1 °F (36.7 °C) 92 18 134/80 97 %   05/11/21 1940 98.1 °F (36.7 °C) 75 18 (!) 155/80 96 %   05/11/21 1529 98.3 °F (36.8 °C) 81 18 (!) 151/79 98 %     Oxygen Therapy  O2 Sat (%): 97 % (05/12/21 1110)  Pulse via Oximetry: 84 beats per minute (05/11/21 0900)  O2 Device: None (Room air) (05/11/21 1529)    Body mass index is 17.49 kg/m².     Physical Exam:   General:  Elderly male, chronically ill-appearing, no acute distress, speaking in full sentences, no use of accessory muscles   Lungs:  Clear to auscultation bilaterally   CV:  Regular rate and rhythm with normal S1 and S2   Abdomen:  Soft, nontender, nondistended, hyperactive bowel sounds, no organomegaly,  Extremities:  No cyanosis clubbing or edema   Neuro:  Nonfocal, A&O x3   Psych:  Normal affect     Data Review:  I have reviewed all labs, meds, and studies from the last 24 hours:    Labs:    Recent Results (from the past 24 hour(s))   METABOLIC PANEL, BASIC    Collection Time: 05/12/21  5:18 AM   Result Value Ref Range    Sodium 142 136 - 145 mmol/L    Potassium 3.7 3.5 - 5.1 mmol/L    Chloride 105 98 - 107 mmol/L    CO2 31 21 - 32 mmol/L    Anion gap 6 (L) 7 - 16 mmol/L    Glucose 87 65 - 100 mg/dL    BUN 24 (H) 8 - 23 MG/DL    Creatinine 0.86 0.8 - 1.5 MG/DL    GFR est AA >60 >60 ml/min/1.73m2    GFR est non-AA >60 >60 ml/min/1.73m2    Calcium 8.5 8.3 - 10.4 MG/DL   CBC WITH AUTOMATED DIFF    Collection Time: 05/12/21  5:18 AM   Result Value Ref Range    WBC 8.7 4.3 - 11.1 K/uL    RBC 3.75 (L) 4.23 - 5.6 M/uL    HGB 10.3 (L) 13.6 - 17.2 g/dL    HCT 33.3 (L) 41.1 - 50.3 % MCV 88.8 79.6 - 97.8 FL    MCH 27.5 26.1 - 32.9 PG    MCHC 30.9 (L) 31.4 - 35.0 g/dL    RDW 13.6 11.9 - 14.6 %    PLATELET 254 826 - 656 K/uL    MPV 8.8 (L) 9.4 - 12.3 FL    ABSOLUTE NRBC 0.00 0.0 - 0.2 K/uL    DF AUTOMATED      NEUTROPHILS 87 (H) 43 - 78 %    LYMPHOCYTES 4 (L) 13 - 44 %    MONOCYTES 8 4.0 - 12.0 %    EOSINOPHILS 0 (L) 0.5 - 7.8 %    BASOPHILS 0 0.0 - 2.0 %    IMMATURE GRANULOCYTES 1 0.0 - 5.0 %    ABS. NEUTROPHILS 7.6 1.7 - 8.2 K/UL    ABS. LYMPHOCYTES 0.3 (L) 0.5 - 4.6 K/UL    ABS. MONOCYTES 0.7 0.1 - 1.3 K/UL    ABS. EOSINOPHILS 0.0 0.0 - 0.8 K/UL    ABS. BASOPHILS 0.0 0.0 - 0.2 K/UL    ABS. IMM. GRANS. 0.0 0.0 - 0.5 K/UL       All Micro Results     Procedure Component Value Units Date/Time    CULTURE, URINE [[id number]] Collected: 05/11/21 1248    Order Status: Completed Specimen: Cath Urine Updated: 05/12/21 0803     Special Requests: NO SPECIAL REQUESTS        Culture result:       NO GROWTH AFTER SHORT PERIOD OF INCUBATION. FURTHER RESULTS TO FOLLOW AFTER OVERNIGHT INCUBATION. EKG Results     None          Other Studies:  No results found.     Current Meds:   Current Facility-Administered Medications   Medication Dose Route Frequency    dextrose 5 % - 0.45% NaCl infusion  75 mL/hr IntraVENous CONTINUOUS    clonazePAM (KlonoPIN) tablet 6 mg  6 mg Oral QHS    levothyroxine (SYNTHROID) tablet 125 mcg  125 mcg Oral ACB    sodium chloride (NS) flush 5-40 mL  5-40 mL IntraVENous Q8H    sodium chloride (NS) flush 5-40 mL  5-40 mL IntraVENous PRN    ondansetron (ZOFRAN) injection 4 mg  4 mg IntraVENous Q6H PRN    enoxaparin (LOVENOX) injection 40 mg  40 mg SubCUTAneous DAILY    morphine injection 0.5 mg  0.5 mg IntraVENous Q4H PRN    acetaminophen (TYLENOL) suppository 650 mg  650 mg Rectal Q4H PRN    cefTRIAXone (ROCEPHIN) 1 g in 0.9% sodium chloride (MBP/ADV) 50 mL MBP  1 g IntraVENous Q24H    famotidine (PF) (PEPCID) 20 mg in 0.9% sodium chloride 10 mL injection  20 mg IntraVENous Q24H    mesalamine (CANASA) suppository 1,000 mg  1 Suppository Rectal Q12H    alcohol 62% (NOZIN) nasal  1 Ampule  1 Ampule Topical Q12H    amitriptyline (ELAVIL) tablet 100 mg  100 mg Oral QHS    OLANZapine (ZyPREXA) tablet 7.5 mg  7.5 mg Oral QHS       Problem List:  Hospital Problems as of 5/12/2021 Date Reviewed: 12/10/2020          Codes Class Noted - Resolved POA    Anxiety ICD-10-CM: F41.9  ICD-9-CM: 300.00  5/11/2021 - Present Unknown        Depression ICD-10-CM: F32.9  ICD-9-CM: 999  5/11/2021 - Present Unknown        UTI (urinary tract infection) ICD-10-CM: N39.0  ICD-9-CM: 599.0  5/11/2021 - Present Unknown        Bilateral hydronephrosis ICD-10-CM: N13.30  ICD-9-CM: 312  5/11/2021 - Present Unknown        Dilated intrahepatic bile duct ICD-10-CM: K83.8  ICD-9-CM: 576.8  5/11/2021 - Present Unknown        Pancreatic duct dilated ICD-10-CM: K86.89  ICD-9-CM: 577.8  5/11/2021 - Present Unknown        Hypothyroid ICD-10-CM: E03.9  ICD-9-CM: 244.9  6/14/2019 - Present Yes    Overview Signed 6/14/2019 12:12 AM by Jerrod Dias MD     stable w/med             Small bowel obstruction [facility]) ICD-10-CM: V42.785  ICD-9-CM: 560.9  3/30/2019 - Present Unknown                   Part of this note was written by using a voice dictation software and the note has been proof read but may still contain some grammatical/other typographical errors.     Signed By: Alejo Michelle MD   [facility] - [facility] Hospitalist Service    May 12, 2021

## 2021-05-12 NOTE — PROGRESS NOTES
Surgery Consult      Patient: Parul Marroquin 66 y.o. male     Consulting Physician:  ER/hospitalist    Chief Complaint: SBO    Subjective:      Parker Garcia is a 66 y.o. male who surgery has been asked to see for evaluation of a recurrent bowel obstruction. He is well known to surgery, having an extremely complicated course since diagnosis of rectal cancer in 2013 with neoadjuvant chemoradiation, with complete response, followed by definitive surgery and completion chemotherapy. He has subsequently had numerous surgical complications after stoma closure to include anastomotic leak(s), wound infections, hernia and mesh infection, and fistula formation. He has been successfully treated for SBO on several occasions. He had followed with Dr Pavel Larsen but has not been seen since 2019. He presented today to the ER due to abdominal pain yesterday with no stoma output for 24 hours. He has not had n/v, fever or chills. He does feel a little bloated. He has actually begun to have watery stoma output since arrival to ER.    5/12/21  Pt reports some nausea overnight. He has emptied his bag and now it is full of liquid stool output again. He reports abdominal cramping but is not in pain. AF, VSS, on room air. WBC 8.      Past Medical History:   Diagnosis Date    Acute deep vein thrombosis (DVT) of non-extremity vein 5/20/2019    Family history of malignant neoplasm of gastrointestinal tract     mother colon/ovarian cancer 67    Fecal incontinence     LAR syndrome    Former cigarette smoker     History of rectal cancer     Hypothyroid     stable w/med    Infection and inflammatory reaction due to other internal prosthetic devices, implants and grafts, subsequent encounter 2017    abd wound/mesh colostomy    Insomnia     takes meds    Major depression     Osteoarthritis, hand     Personal history of colonic polyps 9/2013    x 1    Personal history of malignant neoplasm of rectum, rectosigmoid junction, and anus 2013    Psychiatric disorder     anxiety       Past Surgical History:   Procedure Laterality Date    COLONOSCOPY N/A 2018    COLONOSCOPY / BMI=21 performed by [facility] at [facility] ENDOSCOPY    COLONOSCOPY N/A 3/5/2021    COLONOSCOPY/BMI 19 performed by Katty Olsen MD at [address] [place] [address]  2018         COLONOSCOPY THRU STOMA,LESN RMVL W/SNARE  3/5/2021         ENDOSCOPY, COLON, DIAGNOSTIC  last 2/3/15    [facility]--no polyps--3 year recall    HX COLOSTOMY  16    HX GI  2016    Sacral nerve stimlator lead trial (unsucessful) and removal    HX HERNIA REPAIR  3/2015, 2016    HX HERNIA REPAIR      and Colostomy in May    HX OTHER SURGICAL  10/7/2013    [facility]ily---endorectal us    HX OTHER SURGICAL Bilateral     cataracts      HX POLYPECTOMY      HX TOTAL COLECTOMY  2013    [facility]ily--lap LAR with coloproctostomy, mobilization splenic flexure, diverting loop ileostomy    HX TOTAL COLECTOMY Right 2014    for leak    HX VASCULAR ACCESS Left     single lumen port/subsequently removed    IR INSERT TUNL CVC W/O PORT OVER 5 YR  2019    IR REPLACE CVC TUNNELED W/O PORT  2019        Family History   Problem Relation Age of Onset    Cancer Mother         colon and ovarian    Cancer Brother         prostate    Alcohol abuse Brother     Cancer Maternal Grandmother         bone    Alcohol abuse Father     Alcohol abuse Sister     Stroke Paternal Grandfather        Social History     Socioeconomic History    Marital status: SINGLE     Spouse name: Not on file    Number of children: Not on file    Years of education: Not on file    Highest education level: Not on file   Tobacco Use    Smoking status: Former Smoker     Packs/day: 1.00     Years: 4.00     Pack years: 4.00     Types: Cigarettes     Quit date: 1963     Years since quittin.5    Smokeless tobacco: Never Used   Substance and Sexual Activity    Alcohol use: Not Currently     Alcohol/week: 11.7 standard drinks     Types: 14 Cans of beer per week     Comment: advised stop drinking given issues    Drug use: No    Sexual activity: Never        Current Facility-Administered Medications   Medication Dose Route Frequency    clonazePAM (KlonoPIN) tablet 2 mg  2 mg Oral QHS    dextrose 5 % - 0.45% NaCl infusion  75 mL/hr IntraVENous CONTINUOUS    sodium chloride (NS) flush 5-40 mL  5-40 mL IntraVENous Q8H    sodium chloride (NS) flush 5-40 mL  5-40 mL IntraVENous PRN    ondansetron (ZOFRAN) injection 4 mg  4 mg IntraVENous Q6H PRN    enoxaparin (LOVENOX) injection 40 mg  40 mg SubCUTAneous DAILY    morphine injection 0.5 mg  0.5 mg IntraVENous Q4H PRN    acetaminophen (TYLENOL) suppository 650 mg  650 mg Rectal Q4H PRN    cefTRIAXone (ROCEPHIN) 1 g in 0.9% sodium chloride (MBP/ADV) 50 mL MBP  1 g IntraVENous Q24H    famotidine (PF) (PEPCID) 20 mg in 0.9% sodium chloride 10 mL injection  20 mg IntraVENous Q24H    mesalamine (CANASA) suppository 1,000 mg  1 Suppository Rectal Q12H    alcohol 62% (NOZIN) nasal  1 Ampule  1 Ampule Topical Q12H    amitriptyline (ELAVIL) tablet 100 mg  100 mg Oral QHS    OLANZapine (ZyPREXA) tablet 7.5 mg  7.5 mg Oral QHS        No Known Allergies    Review of Systems:  Pertinent items are noted in the History of Present Illness. Has ongoing rectal discharge- using suppository from GI with good result  No cp, sob, wt loss.     Objective:        Patient Vitals for the past 8 hrs:   BP Temp Pulse Resp SpO2   21 0230 (!) 147/81 98.6 °F (37 °C) 87 18 96 %       Temp (24hrs), Av.2 °F (36.8 °C), Min:98 °F (36.7 °C), Max:98.6 °F (37 °C)      Physical Exam:  GENERAL: alert, cooperative, no distress, appears stated age, EYE: negative findings: anicteric sclera, THROAT & NECK: normal and mucous membranes dry, LUNG: clear to auscultation bilaterally, HEART: regular rate and rhythm, ABDOMEN: soft, minimally distended, nontender. Palpable suture under skin/scar in midline. Stoma pink, some significant amount of watery stool output in bag, EXTREMITIES:  extremities normal, atraumatic, no cyanosis or edema, SKIN: no rash or abnormalities, NEUROLOGIC: negative findings: alert, oriented x3  speech normal in context and clarity  memory intact grossly  cranial nerves II-XII intact, PSYCHIATRIC: non focal      Labs:   Recent Results (from the past 24 hour(s))   URINE MICROSCOPIC    Collection Time: 05/11/21 12:48 PM   Result Value Ref Range    WBC >100 0 /hpf    RBC >100 0 /hpf    Epithelial cells 0-3 0 /hpf    Bacteria 4+ (H) 0 /hpf    Casts 0-3 0 /lpf    Crystals, urine 0 0 /LPF    Mucus 0 0 /lpf   CULTURE, URINE    Collection Time: 05/11/21 12:48 PM    Specimen: Cath Urine   Result Value Ref Range    Special Requests: NO SPECIAL REQUESTS      Culture result:        NO GROWTH AFTER SHORT PERIOD OF INCUBATION. FURTHER RESULTS TO FOLLOW AFTER OVERNIGHT INCUBATION.    METABOLIC PANEL, BASIC    Collection Time: 05/12/21  5:18 AM   Result Value Ref Range    Sodium 142 136 - 145 mmol/L    Potassium 3.7 3.5 - 5.1 mmol/L    Chloride 105 98 - 107 mmol/L    CO2 31 21 - 32 mmol/L    Anion gap 6 (L) 7 - 16 mmol/L    Glucose 87 65 - 100 mg/dL    BUN 24 (H) 8 - 23 MG/DL    Creatinine 0.86 0.8 - 1.5 MG/DL    GFR est AA >60 >60 ml/min/1.73m2    GFR est non-AA >60 >60 ml/min/1.73m2    Calcium 8.5 8.3 - 10.4 MG/DL   CBC WITH AUTOMATED DIFF    Collection Time: 05/12/21  5:18 AM   Result Value Ref Range    WBC 8.7 4.3 - 11.1 K/uL    RBC 3.75 (L) 4.23 - 5.6 M/uL    HGB 10.3 (L) 13.6 - 17.2 g/dL    HCT 33.3 (L) 41.1 - 50.3 %    MCV 88.8 79.6 - 97.8 FL    MCH 27.5 26.1 - 32.9 PG    MCHC 30.9 (L) 31.4 - 35.0 g/dL    RDW 13.6 11.9 - 14.6 %    PLATELET 050 826 - 308 K/uL    MPV 8.8 (L) 9.4 - 12.3 FL    ABSOLUTE NRBC 0.00 0.0 - 0.2 K/uL    DF AUTOMATED      NEUTROPHILS 87 (H) 43 - 78 %    LYMPHOCYTES 4 (L) 13 - 44 %    MONOCYTES 8 4.0 - 12.0 % EOSINOPHILS 0 (L) 0.5 - 7.8 %    BASOPHILS 0 0.0 - 2.0 %    IMMATURE GRANULOCYTES 1 0.0 - 5.0 %    ABS. NEUTROPHILS 7.6 1.7 - 8.2 K/UL    ABS. LYMPHOCYTES 0.3 (L) 0.5 - 4.6 K/UL    ABS. MONOCYTES 0.7 0.1 - 1.3 K/UL    ABS. EOSINOPHILS 0.0 0.0 - 0.8 K/UL    ABS. BASOPHILS 0.0 0.0 - 0.2 K/UL    ABS. IMM. GRANS. 0.0 0.0 - 0.5 K/UL        Data Review reviewed  CT   CT Results (most recent):  Results from Hospital Encounter encounter on 05/11/21   CT ABD PELV W CONT    Narrative CT ABDOMEN AND PELVIS WITH CONTRAST    HISTORY: Restaging rectal carcinoma    COMPARISON: 8/31/2020    TECHNIQUE: Helical imaging was performed from the lung bases through the ischial  tuberosities during the intravenous infusion of 100 cc of Isovue-370. Oral  contrast was administered. Coronal and sagittal reformats were performed. Dose reduction techniques used: Automated exposure control, adjustment of the  mAs and/or kVp according to patient's size, and iterative reconstruction  techniques. FINDINGS:  *  LUNG BASES: Coronary artery disease. *  LIVER: Within normal limits. *  GALLBLADDER AND BILE DUCTS: Normal. Mild intrahepatic biliary ductal  dilatation. *  SPLEEN: Within normal limits. *  URINARY TRACT: Left renal atrophy. Bilateral hydronephrosis of uncertain  etiology. *  ADRENALS: Within normal limits. *  PANCREAS: Dilatation of the pancreatic duct. *  GASTROINTESTINAL TRACT: Low anterior colon resection. New enhancing mass with  necrotic center to the right of the rectum in the presacral space measuring 3.4  x 2.0 cm. Small bowel obstruction containing fecal lies material.  *  RETROPERITONEUM: Within normal limits. *  PERITONEAL CAVITY AND ABDOMINAL WALL: Within normal limits. *  PELVIS: Within normal limits. *  SPINE / BONES: Within normal limits. *  OTHER COMMENTS: None. Impression Findings worrisome for right presacral tumor recurrence. Consider obtaining a  PET/CT scan.     Small bowel obstruction containing fecal-like material. This is worse when  compared to 8/31/2020. Bilateral hydronephrosis of uncertain etiology. Was not present on the prior  study. Dilatation of the intrahepatic biliary ducts and pancreatic duct. This is more  prominent on the study secondary to the use of intravenous contrast. Correlation  with LFTs is recommended.         Date of Dictation: 5/11/2021 10:10 AM            Assessment:     Mechanical SBO due to adhesions  H/o rectal cancer  Right hydroureter    Plan:     Continue current care  He has significant colostomy output this AM and SBO seems to be resolving  Okay to use compounded suppositories from home  Okay to shower  Start sips of clear liquids      Signed By: BARBRA Alves     May 12, 2021

## 2021-05-12 NOTE — PROGRESS NOTES
Initial visit with patient by [address] [place], [place] [address]all [facility]. Card left. Leatha Torres M.Div.

## 2021-05-12 NOTE — PROGRESS NOTES
Problem: Falls - Risk of  Goal: *Absence of Falls  Description: Document Roman Lamar Fall Risk and appropriate interventions in the flowsheet. Outcome: Progressing Towards Goal  Note: Fall Risk Interventions:            Medication Interventions: Evaluate medications/consider consulting pharmacy, Patient to call before getting OOB, Teach patient to arise slowly         History of Falls Interventions:  Investigate reason for fall, Room close to nurse's station, Evaluate medications/consider consulting pharmacy         Problem: Patient Education: Go to Patient Education Activity  Goal: Patient/Family Education  Outcome: Progressing Towards Goal

## 2021-05-12 NOTE — CONSULTS
[facility] Hematology & Oncology: In Patient Hematology / Oncology Consult Note    Reason for Consult:  Pelvic mass and h/o rectal cancer  Referring Physician:  Lara Cason MD    History of Present Illness:  66 y.o. M history of stage III rectal cancer status post neoadjuvant chemotherapy followed by surgery and adjuvant chemo in 2014, had a remained in KAIDEN status for regular surveillance, has had dealt with surgical complications previously including repeat surgery for ostomy obstruction, presented to ER with complaining of no output from colostomy for 24 hours, nausea, vomiting, CT showed small bowel obstruction, bilateral hydronephrosis, worrisome right presacral cystic tumor appearance concerning for cancer recurrence versus abscess. Surgery evaluated and managed with conservative measures for SBO, urology consulted for hydronephrosis. Oncology is consulted for concern of cancer recurrence. Review of Systems:  Constitutional Denies fever or chills. Denies weight loss or appetite changes. HEENT Denies trauma, bluring vision, hearing loss, ear pain, nosebleeds, sore throat, neck pain and ear discharge. Skin Denies lesions or rashes. Lungs Denies shortness of breath, cough, sputum production or hemoptysis. Cardiovascular Denies chest pain, palpitations, orthopnea, claudication and leg swelling. Gastrointestinal  nauseous, vomiting, small bowel obstruction.  Denies dysuria, frequency or hesitancy of urination   Neuro Denies headaches, visual changes or ataxia. Denies dizziness, tingling, tremors, sensory change, speech change, focal weakness and headaches. Hematology Denies nasal/gum bleeding, denies easy bruise   Endo Denies heat/cold intolerance, denies diabetes. MSK Denies back pain, swollen legs, myalgias and falls. Psychiatric/Behavioral Denies depression and substance abuse. The patient is not nervous/anxious.        No Known Allergies  Past Medical History:   Diagnosis Date  Acute deep vein thrombosis (DVT) of non-extremity vein 5/20/2019    Family history of malignant neoplasm of gastrointestinal tract     mother colon/ovarian cancer 67    Fecal incontinence     LAR syndrome    Former cigarette smoker     History of rectal cancer     Hypothyroid     stable w/med    Infection and inflammatory reaction due to other internal prosthetic devices, implants and grafts, subsequent encounter 2017    abd wound/mesh colostomy    Insomnia     takes meds    Major depression     Osteoarthritis, hand     Personal history of colonic polyps 9/2013    x 1    Personal history of malignant neoplasm of rectum, rectosigmoid junction, and anus 9/2013    Psychiatric disorder     anxiety     Past Surgical History:   Procedure Laterality Date    COLONOSCOPY N/A 2/12/2018    COLONOSCOPY / BMI=21 performed by Odell Wright MD at [facility] N/A 3/5/2021    COLONOSCOPY/BMI 19 performed by John Paul Chicas MD at [address] [place]  2/12/2018         COLONOSCOPY THRU STOMA,LILLIAN RMVL W/SNARE  3/5/2021         ENDOSCOPY, COLON, DIAGNOSTIC  last 2/3/15    Emily--no polyps--3 year recall    HX COLOSTOMY  5/11/16    HX GI  4/2016    Sacral nerve stimlator lead trial (unsucessful) and removal    HX HERNIA REPAIR  3/2015, 5/2016    HX HERNIA REPAIR      and Colostomy in May    HX OTHER SURGICAL  10/7/2013    Emily---endorectal us    HX OTHER SURGICAL Bilateral     cataracts  2017    HX POLYPECTOMY      HX TOTAL COLECTOMY  11/2013    Emily--lap LAR with coloproctostomy, mobilization splenic flexure, diverting loop ileostomy    HX TOTAL COLECTOMY Right 8/2014    for leak    HX VASCULAR ACCESS Left 4/14    single lumen port/subsequently removed    IR INSERT TUNL CVC W/O PORT OVER 5 YR  5/20/2019    IR REPLACE CVC TUNNELED W/O PORT  5/30/2019     Family History   Problem Relation Age of Onset    Cancer Mother         colon and ovarian    Cancer Brother         prostate    Alcohol abuse Brother     Cancer Maternal Grandmother         bone    Alcohol abuse Father     Alcohol abuse Sister     Stroke Paternal Grandfather      Social History     Socioeconomic History    Marital status: SINGLE     Spouse name: Not on file    Number of children: Not on file    Years of education: Not on file    Highest education level: Not on file   Occupational History    Not on file   Social Needs    Financial resource strain: Not on file    Food insecurity     Worry: Not on file     Inability: Not on file   [employer] needs     Medical: Not on file     Non-medical: Not on file   Tobacco Use    Smoking status: Former Smoker     Packs/day: 1.00     Years: 4.00     Pack years: 4.00     Types: Cigarettes     Quit date: 1963     Years since quittin.5    Smokeless tobacco: Never Used   Substance and Sexual Activity    Alcohol use: Not Currently     Alcohol/week: 11.7 standard drinks     Types: 14 Cans of beer per week     Comment: advised stop drinking given issues    Drug use: No    Sexual activity: Never   Lifestyle    Physical activity     Days per week: Not on file     Minutes per session: Not on file    Stress: Not on file   Relationships    Social connections     Talks on phone: Not on file     Gets together: Not on file     Attends [employer] service: Not on file     Active member of club or organization: Not on file     Attends meetings of clubs or organizations: Not on file     Relationship status: Not on file    Intimate partner violence     Fear of current or ex partner: Not on file     Emotionally abused: Not on file     Physically abused: Not on file     Forced sexual activity: Not on file   Other Topics Concern    Not on file   Social History Narrative    Not on file     Current Facility-Administered Medications   Medication Dose Route Frequency Provider Last Rate Last Admin    dextrose 5 % - 0.45% NaCl infusion  75 mL/hr IntraVENous CONTINUOUS Liyah Prado Aas, MD 75 mL/hr at 21 1034 75 mL/hr at 21 1034    clonazePAM (KlonoPIN) tablet 6 mg  6 mg Oral QHS Liyah Prado Aas, MD        levothyroxine (SYNTHROID) tablet 125 mcg  125 mcg Oral ACB Liyah Prado Aas, MD   125 mcg at 21 1052    sodium chloride (NS) flush 5-40 mL  5-40 mL IntraVENous Q8H Liyah Prado Aas, MD   10 mL at 21 1410    sodium chloride (NS) flush 5-40 mL  5-40 mL IntraVENous PRN Liyah Prado Aas, MD        ondansetron (ZOFRAN) injection 4 mg  4 mg IntraVENous Q6H PRN Liyah Prado Aas, MD   4 mg at 21 0525    enoxaparin (LOVENOX) injection 40 mg  40 mg SubCUTAneous DAILY Liyah Prado Aas, MD   40 mg at 21 0838    morphine injection 0.5 mg  0.5 mg IntraVENous Q4H PRN Liyah Prado Aas, MD        acetaminophen (TYLENOL) suppository 650 mg  650 mg Rectal Q4H PRN Liyah Prado Aas, MD        cefTRIAXone (ROCEPHIN) 1 g in 0.9% sodium chloride (MBP/ADV) 50 mL MBP  1 g IntraVENous Q24H Liyah Prado Aas,  mL/hr at 21 1915 1 g at 21 1915    famotidine (PF) (PEPCID) 20 mg in 0.9% sodium chloride 10 mL injection  20 mg IntraVENous Q24H Liyah Prado Aas, MD   20 mg at 21 1409    mesalamine (CANASA) suppository 1,000 mg  1 Suppository Rectal Q12H Liyah Prado Aas, MD   1,000 mg at 21 0834    alcohol 62% (NOZIN) nasal  1 Ampule  1 Ampule Topical Q12H Alvaro Prado MD   1 Ampule at 21 0838    amitriptyline (ELAVIL) tablet 100 mg  100 mg Oral QHS Alvaro Prado MD   100 mg at 21 2213    OLANZapine (ZyPREXA) tablet 7.5 mg  7.5 mg Oral QHS Alvaro Prado MD   7.5 mg at 21 2200       OBJECTIVE:  Patient Vitals for the past 8 hrs:   BP Temp Pulse Resp SpO2   21 1449 123/69 97.4 °F (36.3 °C) 73 18 96 %     Temp (24hrs), Av °F (36.7 °C), Min:97.4 °F (36.3 °C), Max:98.6 °F (37 °C)    No intake/output data recorded. Physical Exam:  Constitutional: Oriented to person, place, and time.    Well-developed and thin   HEENT: Normocephalic and atraumatic. Oropharynx is clear and moist.   Conjunctivae and EOM are normal. Pupils are equal, round, and reactive to light. No scleral icterus. Neck supple. No JVD present. No tracheal deviation present. No thyromegaly present. Lymph node   No palpable submandibular, cervical, supraclavicular, axillary and inguinal lymph nodes. Skin Warm and dry. No bruising and no rash noted. No erythema. No pallor. Respiratory Effort normal and breath sounds normal.  No respiratory distress. No wheezes. No rales. No tenderness. CVS Normal rate, regular rhythm and normal heart sounds. Exam reveals no gallop, no friction and no rub. No murmur heard. Abdomen  colostomy normal appearance. Soft. Bowel sounds are normal. Exhibits no distension. There is no tenderness. There is no rebound and no guarding. Neuro Normal reflexes. No cranial nerve deficit. Exhibits normal muscle tone, 5 of 5 strength of all extremities. MSK Normal range of motion. No edema and no tenderness.    Psych Normal mood, affect, behavior, judgment and thought content      Labs:    Recent Labs     05/12/21 0518 05/11/21  0812   WBC 8.7 8.1   RBC 3.75* 4.28   HGB 10.3* 11.7*   HCT 33.3* 37.6*   MCV 88.8 87.9   MCH 27.5 27.3   MCHC 30.9* 31.1*   RDW 13.6 13.6    370   GRANS 87* 88*   LYMPH 4* 4*   MONOS 8 8   EOS 0* 0*   BASOS 0 0   IG 1 0   DF AUTOMATED AUTOMATED   ANEU 7.6 7.1   ABL 0.3* 0.3*   ABM 0.7 0.6   ELIAN 0.0 0.0   ABB 0.0 0.0   AIG 0.0 0.0      Recent Labs     05/12/21 0518 05/11/21  0812    138   K 3.7 4.2    100   CO2 31 30   AGAP 6* 8   GLU 87 131*   BUN 24* 28*   CREA 0.86 1.19   GFRAA >60 >60   GFRNA >60 >60   CA 8.5 9.4   AP  --  91   TP  --  8.0   ALB  --  3.3   GLOB  --  4.7*   AGRAT  --  0.7*       ASSESSMENT/RECOMMENDATION:    Active Problems:    Small bowel obstruction (HCC) (3/30/2019)      Hypothyroid (6/14/2019)      Overview: stable w/med      Anxiety (5/11/2021)      Depression (5/11/2021)      UTI (urinary tract infection) (5/11/2021)      Bilateral hydronephrosis (5/11/2021)      Dilated intrahepatic bile duct (5/11/2021)      Pancreatic duct dilated (5/11/2021)    66 y.o. M history of stage III rectal cancer admitted for small bowel obstruction and CT concern for presacral cystic mass, discussed the concern of cancer recurrence versus abscess, discussed the further imaging with MRI or PET however unlikely to definitively clarify the nature of the finding, consult IR for biopsy and/or aspiration, will follow. Thank you for allowing me to participate in the care of Mr. Marroquin. Doug Mcleod M.D.   [address]  Office : [phone]  Fax : [phone]

## 2021-05-12 NOTE — PROGRESS NOTES
Subjective:     Patient has complaints of fatigue/lethargy. Large amounts of output from stoma today. Objective:     Visit Vitals  /69 (BP 1 Location: Right upper arm, BP Patient Position: At rest;Lying)   Pulse 73   Temp 97.4 °F (36.3 °C)   Resp 18   Ht 5' 8\" (1.727 m)   Wt 115 lb (52.2 kg)   SpO2 96%   BMI 17.49 kg/m²   ; Temp (24hrs), Av °F (36.7 °C), Min:97.4 °F (36.3 °C), Max:98.6 °F (37 °C)    I/O reviewed in graphics    Physical Exam:    General-in no distress. Lungs-CTA bilaterally without rales, rhonchi, or wheezes. Respiratory effort isNormal    Heart- Regular rate and rhythm    Abdomen-distention decreased  from previous exam.  Bowel sounds are normal .There is no tenderness with air and some liquid stool in ostomy bag       Labs:   Recent Results (from the past 24 hour(s))   METABOLIC PANEL, BASIC    Collection Time: 21  5:18 AM   Result Value Ref Range    Sodium 142 136 - 145 mmol/L    Potassium 3.7 3.5 - 5.1 mmol/L    Chloride 105 98 - 107 mmol/L    CO2 31 21 - 32 mmol/L    Anion gap 6 (L) 7 - 16 mmol/L    Glucose 87 65 - 100 mg/dL    BUN 24 (H) 8 - 23 MG/DL    Creatinine 0.86 0.8 - 1.5 MG/DL    GFR est AA >60 >60 ml/min/1.73m2    GFR est non-AA >60 >60 ml/min/1.73m2    Calcium 8.5 8.3 - 10.4 MG/DL   CBC WITH AUTOMATED DIFF    Collection Time: 21  5:18 AM   Result Value Ref Range    WBC 8.7 4.3 - 11.1 K/uL    RBC 3.75 (L) 4.23 - 5.6 M/uL    HGB 10.3 (L) 13.6 - 17.2 g/dL    HCT 33.3 (L) 41.1 - 50.3 %    MCV 88.8 79.6 - 97.8 FL    MCH 27.5 26.1 - 32.9 PG    MCHC 30.9 (L) 31.4 - 35.0 g/dL    RDW 13.6 11.9 - 14.6 %    PLATELET 134 506 - 200 K/uL    MPV 8.8 (L) 9.4 - 12.3 FL    ABSOLUTE NRBC 0.00 0.0 - 0.2 K/uL    DF AUTOMATED      NEUTROPHILS 87 (H) 43 - 78 %    LYMPHOCYTES 4 (L) 13 - 44 %    MONOCYTES 8 4.0 - 12.0 %    EOSINOPHILS 0 (L) 0.5 - 7.8 %    BASOPHILS 0 0.0 - 2.0 %    IMMATURE GRANULOCYTES 1 0.0 - 5.0 %    ABS. NEUTROPHILS 7.6 1.7 - 8.2 K/UL    ABS.  LYMPHOCYTES 0.3 (L) 0.5 - 4.6 K/UL    ABS. MONOCYTES 0.7 0.1 - 1.3 K/UL    ABS. EOSINOPHILS 0.0 0.0 - 0.8 K/UL    ABS. BASOPHILS 0.0 0.0 - 0.2 K/UL    ABS. IMM. GRANS. 0.0 0.0 - 0.5 K/UL       Data Review reviewed  Consultants documentation    Assessment:     Active Problems:    Small bowel obstruction ([address] (3/30/2019)      Hypothyroid (6/14/2019)      Overview: stable w/med      Anxiety (5/11/2021)      Depression (5/11/2021)      UTI (urinary tract infection) (5/11/2021)      Bilateral hydronephrosis (5/11/2021)      Dilated intrahepatic bile duct (5/11/2021)      Pancreatic duct dilated (5/11/2021)          Plan/Recommendations/Medical Decision Making:     Adhesive SBO clinically resolved. Recommend advancing diet as tolerated    Note plan for IR review to consider biopsy. Suspect urology will want to follow hydro.     Nothing surgical at this time; will follow along

## 2021-05-12 NOTE — PROGRESS NOTES
Care Management Interventions  PCP Verified by CM: Yes(Weikle, Catalina Schlatter, DO)  Mode of Transport at Discharge: (friend)  Transition of Care Consult (CM Consult): Discharge Planning  Current Support Network: Own Home, Lives Alone  Confirm Follow Up Transport: Family  The Patient and/or Patient Representative was Provided with a Choice of Provider and Agrees with the Discharge Plan?: Yes  Freedom of Choice List was Provided with Basic Dialogue that Supports the Patient's Individualized Plan of Care/Goals, Treatment Preferences and Shares the Quality Data Associated with the Providers?: Yes  Discharge Location  Discharge Placement: Home  Visited with pt to establish prior to admission baseline and discuss there anticipated goals at time of d/c. Pt is a 65 y/o M with Hx of Rectal CA, DVT, Anxiety and former smoker, who comes in c/o abd pain and no output with colostomy in approx 12 hrs. Pt lives at home alone, states is completely inde with ADL's, has no family in the area, but a friend \" I can call on \". He drives and sees Dr. Adriana Lake, Rx are filled at [facility] on [address] [place] [address]. Pt's goal is to return home upon d/c. MD note states Hem/Onc consulted for disease recurrence, consult IR for biopsy, conservative measures for SBO at this time b/c pt is having this am, and pt to start sips of clear liquids. CM to follow.

## 2021-05-12 NOTE — PROGRESS NOTES
END OF SHIFT NOTE:    Intake/Output  No intake/output data recorded. Voiding: YES  Catheter: NO  Drain:              Stool:  2 occurrences. Stool Assessment  Stool Appearance: Loose (05/12/21 0825)    Emesis:  0 occurrences. VITAL SIGNS  Patient Vitals for the past 12 hrs:   Temp Pulse Resp BP SpO2   05/12/21 1449 97.4 °F (36.3 °C) 73 18 123/69 96 %   05/12/21 1110 97.6 °F (36.4 °C) 84 18 127/68 97 %   05/12/21 0810 98.1 °F (36.7 °C) 81 20 134/74 93 %       Pain Assessment  Pain 1  Pain Scale 1: Numeric (0 - 10) (05/12/21 0825)  Pain Intensity 1: 0 (05/12/21 0825)  Patient Stated Pain Goal: 0 (05/12/21 0825)    Ambulating  Yes    Additional Information:  time 8AM tomorrow bx     Shift report given to oncoming nurse at the bedside.     Juan Sena RN

## 2021-05-12 NOTE — MANAGEMENT PLAN
[facility] Hematology & Oncology        Inpatient Hematology / Oncology Plan of Care    Reason for Consult:  Small bowel obstruction [facility]) [E39.323]  Referring Physician:  Rosa Cheadle, MD    History of Present Illness:  Mr. Jamila Girard is a 66 y.o. male admitted on 5/11/2021. The primary encounter diagnosis was Intestinal obstruction, unspecified cause, unspecified whether partial or complete ([address] Diagnoses of Small bowel obstruction ([address] Hydroureter, right, and History of rectal cancer were also pertinent to this visit. Daniel Toth His PMH includes hypothyroidism, anxiety/depression, colostomy, proctitis and multiple abd surgeries. He is a known patient of Dr. Milad Young with Stage III rectal cancer s/p neoadjuvant chemo followed by LAR and adjuvant 5FU/leucovorin from 4/2014, completed 8 cycles. He presented with c/o no output from colostomy for 24 hrs, mild nausea, episode of vomiting, and cramping abd discomfort. CT AP with findings worrisome for R presacral tumor recurrence; SBO; b/l hydronephrosis; and dilatation of the intrahepatic biliary ducts and pancreatic duct. CEA 1.6, however pt has never had elevated CEA. Surgery following, on conservative measures for SBO. Pt now having colostomy output. On CTX for UTI. UCx pending. Urology consulted for b/l hydronephrosis. Kidney fx wnl. Liver fx wnl. We were consulted d/t the concern for disease recurrence.     No Known Allergies  Past Medical History:   Diagnosis Date    Acute deep vein thrombosis (DVT) of non-extremity vein 5/20/2019    Family history of malignant neoplasm of gastrointestinal tract     mother colon/ovarian cancer 67    Fecal incontinence     LAR syndrome    Former cigarette smoker     History of rectal cancer     Hypothyroid     stable w/med    Infection and inflammatory reaction due to other internal prosthetic devices, implants and grafts, subsequent encounter 2017    abd wound/mesh colostomy    Insomnia     takes meds    Major depression     Osteoarthritis, hand     Personal history of colonic polyps 9/2013    x 1    Personal history of malignant neoplasm of rectum, rectosigmoid junction, and anus 9/2013    Psychiatric disorder     anxiety     Past Surgical History:   Procedure Laterality Date    COLONOSCOPY N/A 2/12/2018    COLONOSCOPY / BMI=21 performed by Marbin Sellers MD at [facility] N/A 3/5/2021    COLONOSCOPY/BMI 19 performed by Melba Jeronimo MD at [address] [place]  2/12/2018         COLONOSCOPY THRU STOMA,LESN RMVL W/SNARE  3/5/2021         ENDOSCOPY, COLON, DIAGNOSTIC  last 2/3/15    Emily--no polyps--3 year recall    HX COLOSTOMY  5/11/16    HX GI  4/2016    Sacral nerve stimlator lead trial (unsucessful) and removal    HX HERNIA REPAIR  3/2015, 5/2016    HX HERNIA REPAIR      and Colostomy in May    HX OTHER SURGICAL  10/7/2013    Emily---endorectal us    HX OTHER SURGICAL Bilateral     cataracts  2017    HX POLYPECTOMY      HX TOTAL COLECTOMY  11/2013    Emily--lap LAR with coloproctostomy, mobilization splenic flexure, diverting loop ileostomy    HX TOTAL COLECTOMY Right 8/2014    for leak    HX VASCULAR ACCESS Left 4/14    single lumen port/subsequently removed    IR INSERT TUNL CVC W/O PORT OVER 5 YR  5/20/2019    IR REPLACE CVC TUNNELED W/O PORT  5/30/2019     Family History   Problem Relation Age of Onset    Cancer Mother         colon and ovarian    Cancer Brother         prostate    Alcohol abuse Brother     Cancer Maternal Grandmother         bone    Alcohol abuse Father     Alcohol abuse Sister     Stroke Paternal Grandfather      Social History     Socioeconomic History    Marital status: SINGLE     Spouse name: Not on file    Number of children: Not on file    Years of education: Not on file    Highest education level: Not on file   Occupational History    Not on file   Social Needs    Financial resource strain: Not on file  Food insecurity     Worry: Not on file     Inability: Not on file    Transportation needs     Medical: Not on file     Non-medical: Not on file   Tobacco Use    Smoking status: Former Smoker     Packs/day: 1.00     Years: 4.00     Pack years: 4.00     Types: Cigarettes     Quit date: 1963     Years since quittin.5    Smokeless tobacco: Never Used   Substance and Sexual Activity    Alcohol use: Not Currently     Alcohol/week: 11.7 standard drinks     Types: 14 Cans of beer per week     Comment: advised stop drinking given issues    Drug use: No    Sexual activity: Never   Lifestyle    Physical activity     Days per week: Not on file     Minutes per session: Not on file    Stress: Not on file   Relationships    Social connections     Talks on phone: Not on file     Gets together: Not on file     Attends [employer] service: Not on file     Active member of club or organization: Not on file     Attends meetings of clubs or organizations: Not on file     Relationship status: Not on file    Intimate partner violence     Fear of current or ex partner: Not on file     Emotionally abused: Not on file     Physically abused: Not on file     Forced sexual activity: Not on file   Other Topics Concern    Not on file   Social History Narrative    Not on file     Current Facility-Administered Medications   Medication Dose Route Frequency Provider Last Rate Last Admin    clonazePAM (KlonoPIN) tablet 2 mg  2 mg Oral QHS Marty Prado MD        dextrose 5 % - 0.45% NaCl infusion  75 mL/hr IntraVENous CONTINUOUS Marty Prado MD        sodium chloride (NS) flush 5-40 mL  5-40 mL IntraVENous Q8H Tara Kraus MD   10 mL at 21 0603    sodium chloride (NS) flush 5-40 mL  5-40 mL IntraVENous PRN Tara Kraus MD        ondansetron (ZOFRAN) injection 4 mg  4 mg IntraVENous Q6H PRN Tara Kraus MD   4 mg at 21 0525    enoxaparin (LOVENOX) injection 40 mg  40 mg SubCUTAneous DAILY Gustavo Giordano MD   40 mg at 21 3629    morphine injection 0.5 mg  0.5 mg IntraVENous Q4H PRN Gustavo Giordano MD        acetaminophen (TYLENOL) suppository 650 mg  650 mg Rectal Q4H PRN Gustavo Giordano MD        cefTRIAXone (ROCEPHIN) 1 g in 0.9% sodium chloride (MBP/ADV) 50 mL MBP  1 g IntraVENous Q24H Gustavo Giordano  mL/hr at 21 1617 1 g at 21 1617    famotidine (PF) (PEPCID) 20 mg in 0.9% sodium chloride 10 mL injection  20 mg IntraVENous Q24H Gustavo Giordano MD        mesalamine (CANASA) suppository 1,000 mg  1 Suppository Rectal Q12H Gustavo Giordano MD   1,000 mg at 21 0834    alcohol 62% (NOZIN) nasal  1 Ampule  1 Ampule Topical Q12H Gustavo Giordano MD   1 Ampule at 21 0838    amitriptyline (ELAVIL) tablet 100 mg  100 mg Oral QHS Valeria Orosco MD   100 mg at 21 2213    OLANZapine (ZyPREXA) tablet 7.5 mg  7.5 mg Oral QHS Valeria Orosco MD   7.5 mg at 21 2200       OBJECTIVE:  Patient Vitals for the past 8 hrs:   BP Temp Pulse Resp SpO2   21 0230 (!) 147/81 98.6 °F (37 °C) 87 18 96 %     Temp (24hrs), Av.2 °F (36.8 °C), Min:98 °F (36.7 °C), Max:98.6 °F (37 °C)    No intake/output data recorded. Physical Exam:  Constitutional: Well developed, well nourished male in no acute distress, sitting comfortably in the hospital bed. HEENT: Normocephalic and atraumatic. Oropharynx is clear, mucous membranes are moist.  Extraocular muscles are intact. Sclerae anicteric. Neck supple without JVD. No thyromegaly present. Skin Warm and dry. No bruising and no rash noted. No erythema. No pallor. Respiratory Lungs are clear to auscultation bilaterally without wheezes, rales or rhonchi, normal air exchange without accessory muscle use. CVS Normal rate, regular rhythm and normal S1 and S2. No murmurs, gallops, or rubs. Abdomen Soft, nontender and nondistended, normoactive bowel sounds. No palpable mass.   No hepatosplenomegaly. Colostomy. Neuro Grossly nonfocal with no obvious sensory or motor deficits. MSK Normal range of motion in general.  No edema and no tenderness. Psych Appropriate mood and affect. Labs:    Recent Results (from the past 24 hour(s))   URINE MICROSCOPIC    Collection Time: 05/11/21 12:48 PM   Result Value Ref Range    WBC >100 0 /hpf    RBC >100 0 /hpf    Epithelial cells 0-3 0 /hpf    Bacteria 4+ (H) 0 /hpf    Casts 0-3 0 /lpf    Crystals, urine 0 0 /LPF    Mucus 0 0 /lpf   CULTURE, URINE    Collection Time: 05/11/21 12:48 PM    Specimen: Cath Urine   Result Value Ref Range    Special Requests: NO SPECIAL REQUESTS      Culture result:        NO GROWTH AFTER SHORT PERIOD OF INCUBATION. FURTHER RESULTS TO FOLLOW AFTER OVERNIGHT INCUBATION. METABOLIC PANEL, BASIC    Collection Time: 05/12/21  5:18 AM   Result Value Ref Range    Sodium 142 136 - 145 mmol/L    Potassium 3.7 3.5 - 5.1 mmol/L    Chloride 105 98 - 107 mmol/L    CO2 31 21 - 32 mmol/L    Anion gap 6 (L) 7 - 16 mmol/L    Glucose 87 65 - 100 mg/dL    BUN 24 (H) 8 - 23 MG/DL    Creatinine 0.86 0.8 - 1.5 MG/DL    GFR est AA >60 >60 ml/min/1.73m2    GFR est non-AA >60 >60 ml/min/1.73m2    Calcium 8.5 8.3 - 10.4 MG/DL   CBC WITH AUTOMATED DIFF    Collection Time: 05/12/21  5:18 AM   Result Value Ref Range    WBC 8.7 4.3 - 11.1 K/uL    RBC 3.75 (L) 4.23 - 5.6 M/uL    HGB 10.3 (L) 13.6 - 17.2 g/dL    HCT 33.3 (L) 41.1 - 50.3 %    MCV 88.8 79.6 - 97.8 FL    MCH 27.5 26.1 - 32.9 PG    MCHC 30.9 (L) 31.4 - 35.0 g/dL    RDW 13.6 11.9 - 14.6 %    PLATELET 836 122 - 321 K/uL    MPV 8.8 (L) 9.4 - 12.3 FL    ABSOLUTE NRBC 0.00 0.0 - 0.2 K/uL    DF AUTOMATED      NEUTROPHILS 87 (H) 43 - 78 %    LYMPHOCYTES 4 (L) 13 - 44 %    MONOCYTES 8 4.0 - 12.0 %    EOSINOPHILS 0 (L) 0.5 - 7.8 %    BASOPHILS 0 0.0 - 2.0 %    IMMATURE GRANULOCYTES 1 0.0 - 5.0 %    ABS. NEUTROPHILS 7.6 1.7 - 8.2 K/UL    ABS. LYMPHOCYTES 0.3 (L) 0.5 - 4.6 K/UL    ABS. MONOCYTES 0.7 0.1 - 1.3 K/UL    ABS. EOSINOPHILS 0.0 0.0 - 0.8 K/UL    ABS. BASOPHILS 0.0 0.0 - 0.2 K/UL    ABS. IMM. GRANS. 0.0 0.0 - 0.5 K/UL       Imaging:  CT ABD PELV W CONT [[id number]] Collected: 05/11/21 1010   Order Status: Completed Updated: 05/11/21 1017   Narrative:     CT ABDOMEN AND PELVIS WITH CONTRAST     HISTORY: Restaging rectal carcinoma     COMPARISON: 8/31/2020     TECHNIQUE: Helical imaging was performed from the lung bases through the ischial   tuberosities during the intravenous infusion of 100 cc of Isovue-370. Oral   contrast was administered. Coronal and sagittal reformats were performed. Dose reduction techniques used: Automated exposure control, adjustment of the   mAs and/or kVp according to patient's size, and iterative reconstruction   techniques. FINDINGS:   *  LUNG BASES: Coronary artery disease. *  LIVER: Within normal limits. Marvin Aubrey AND BILE DUCTS: Normal. Mild intrahepatic biliary ductal   dilatation. *  SPLEEN: Within normal limits. Floria Flick TRACT: Left renal atrophy. Bilateral hydronephrosis of uncertain   etiology. *  ADRENALS: Within normal limits. *  PANCREAS: Dilatation of the pancreatic duct. *  GASTROINTESTINAL TRACT: Low anterior colon resection. New enhancing mass with   necrotic center to the right of the rectum in the presacral space measuring 3.4   x 2.0 cm. Small bowel obstruction containing fecal lies material.   *  RETROPERITONEUM: Within normal limits. *  PERITONEAL CAVITY AND ABDOMINAL WALL: Within normal limits. *  PELVIS: Within normal limits. *  SPINE / BONES: Within normal limits. *  OTHER COMMENTS: None. Impression:       Findings worrisome for right presacral tumor recurrence. Consider obtaining a   PET/CT scan. Small bowel obstruction containing fecal-like material. This is worse when   compared to 8/31/2020. Bilateral hydronephrosis of uncertain etiology. Was not present on the prior   study. Dilatation of the intrahepatic biliary ducts and pancreatic duct. This is more   prominent on the study secondary to the use of intravenous contrast. Correlation   with LFTs is recommended.           ASSESSMENT:  Problem List  Date Reviewed: 12/10/2020          Codes Class Noted    Anxiety ICD-10-CM: F41.9  ICD-9-CM: 300.00  5/11/2021        Depression ICD-10-CM: F32.9  ICD-9-CM: 874  5/11/2021        UTI (urinary tract infection) ICD-10-CM: N39.0  ICD-9-CM: 599.0  5/11/2021        Bilateral hydronephrosis ICD-10-CM: N13.30  ICD-9-CM: 699  5/11/2021        Dilated intrahepatic bile duct ICD-10-CM: K83.8  ICD-9-CM: 576.8  5/11/2021        Pancreatic duct dilated ICD-10-CM: K86.89  ICD-9-CM: 577.8  5/11/2021        Major depression ICD-10-CM: F32.9  ICD-9-CM: 296.20  Unknown        Hypothyroid ICD-10-CM: E03.9  ICD-9-CM: 244.9  6/14/2019    Overview Signed 6/14/2019 12:12 AM by Jordan Zavala MD     stable w/med             Fever ICD-10-CM: R50.9  ICD-9-CM: 780.60  6/13/2019        Acute UTI (urinary tract infection) ICD-10-CM: N39.0  ICD-9-CM: 599.0  6/13/2019        Anemia ICD-10-CM: D64.9  ICD-9-CM: 285.9  6/13/2019        Elevated LFTs ICD-10-CM: R79.89  ICD-9-CM: 790.6  6/13/2019        Acute blood loss anemia ICD-10-CM: D62  ICD-9-CM: 285.1  5/21/2019        Acute deep vein thrombosis (DVT) of non-extremity vein ICD-10-CM: I82.90  ICD-9-CM: 453.9  5/20/2019        DVT (deep vein thrombosis) in pregnancy ICD-10-CM: O22.30  ICD-9-CM: 671.30  5/20/2019        Malnutrition ([address] ICD-10-CM: E46  ICD-9-CM: 263.9  5/19/2019        On total parenteral nutrition (TPN) ICD-10-CM: Z78.9  ICD-9-CM: V49.89  5/19/2019        Colostomy care ([facility] ICD-10-CM: Z43.3  ICD-9-CM: V55.3  5/19/2019        Chronic indwelling Bishop catheter ICD-10-CM: Z97.8  ICD-9-CM: V45.89  5/19/2019        Moderate protein-calorie malnutrition ([facility] ICD-10-CM: E44.0  ICD-9-CM: 263.0  5/19/2019        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  ICD-9-CM: 569.81  4/29/2019        Small bowel obstruction ([facility] ICD-10-CM: V85.275  ICD-9-CM: 560.9  3/30/2019        SBO (small bowel obstruction) ([facility] ICD-10-CM: L70.667  ICD-9-CM: 560.9  3/22/2019        Infection of implant ICD-10-CM: T85.79XA  ICD-9-CM: 996.60  3/30/2017        Abdominal wall abscess ICD-10-CM: L02.211  ICD-9-CM: 682.2  5/22/2016        Infected prosthetic mesh of abdominal wall (HCC) ICD-10-CM: T85.79XA  ICD-9-CM: 996.69  5/22/2016        Abscess ICD-10-CM: L02.91  ICD-9-CM: 682.9  5/22/2016        Fecal incontinence ICD-10-CM: R15.9  ICD-9-CM: 787.60  5/11/2016        Bowel incontinence ICD-10-CM: R15.9  ICD-9-CM: 787.60  5/11/2016        Weight loss ICD-10-CM: R63.4  ICD-9-CM: 783.21 Acute 11/19/2015        Hypercalcemia of malignancy ICD-10-CM: E83.52  ICD-9-CM: 275.42  11/19/2015        Hypokalemia ICD-10-CM: E87.6  ICD-9-CM: 276.8  8/29/2014        Hypomagnesemia ICD-10-CM: E83.42  ICD-9-CM: 275.2  8/29/2014        Pleural effusion ICD-10-CM: J90  ICD-9-CM: 511.9  8/29/2014        Hypernatremia ICD-10-CM: E87.0  ICD-9-CM: 276.0  8/29/2014        Severe sepsis with acute organ dysfunction ([facility] ICD-10-CM: A41.9, R65.20  ICD-9-CM: 038.9, 995.92  8/24/2014        Hypoxemia ICD-10-CM: R09.02  ICD-9-CM: 799.02  8/24/2014        Peritonitis (acute) generalized ICD-10-CM: K65.0  ICD-9-CM: 567.21  8/24/2014        Attention to ileostomy [facility]) ICD-10-CM: Z43.2  ICD-9-CM: V55.2  8/20/2014        Dehydration ICD-10-CM: E86.0  ICD-9-CM: 276.51  4/17/2014        Ileus ([facility] ICD-10-CM: K56.7  ICD-9-CM: 560.1  3/20/2014        Postoperative ileus ([facility] ICD-10-CM: K91.89, K56.7  ICD-9-CM: 997.49, 560.1  2/7/2014        Constipation ICD-10-CM: K59.00  ICD-9-CM: 564.00  10/29/2013        Rectal cancer ([facility] ICD-10-CM: C20  ICD-9-CM: 154.1  10/22/2013                RECOMMENDATIONS:  Stage III rectal cancer  - s/p neoadjuvant chemo followed by LAR and adjuvant 5FU/leucovorin from 4/2014, completed 8 cycles  - CT AP with findings worrisome for R presacral tumor recurrence  - CEA 1.6, however pt has never had elevated CEA  - Consult IR for biopsy    SBO  - Surgery following  - conservative measures  - appears to be resolving, now having output    B/l hydronephrosis  - Urology consulted  - Cr wnl    UTI  - UCx pending  - On CTX    Dilatation of intrahepatic biliary ducts / pancreatic duct  - no bili/LFT elevation    Lab studies and imaging studies were personally reviewed. Thank you for allowing us to participate in the care of Mr. Marroquin. Formal consult note by Dr. Kailey Chong to follow.          Nati Glasgow NP   [facility] Hematology & Oncology  [address] [place] [address]  Office : [phone]  Fax : [phone]

## 2021-05-13 ENCOUNTER — HOSPITAL ENCOUNTER (OUTPATIENT)
Dept: CT IMAGING | Age: 76
Discharge: HOME | DRG: 388 | End: 2021-05-13
Attending: NURSE PRACTITIONER
Payer: MEDICARE

## 2021-05-13 VITALS
TEMPERATURE: 97.7 F | RESPIRATION RATE: 16 BRPM | WEIGHT: 115 LBS | HEIGHT: 68 IN | SYSTOLIC BLOOD PRESSURE: 161 MMHG | OXYGEN SATURATION: 97 % | HEART RATE: 68 BPM | BODY MASS INDEX: 17.43 KG/M2 | DIASTOLIC BLOOD PRESSURE: 87 MMHG

## 2021-05-13 PROBLEM — K65.1 PELVIC ABSCESS IN MALE (HCC): Status: ACTIVE | Noted: 2021-05-13

## 2021-05-13 LAB
ALBUMIN SERPL-MCNC: 2.5 G/DL (ref 3.2–4.6)
ALBUMIN/GLOB SERPL: 0.8 {RATIO} (ref 1.2–3.5)
ALP SERPL-CCNC: 69 U/L (ref 50–136)
ALT SERPL-CCNC: 20 U/L (ref 12–65)
ANION GAP SERPL CALC-SCNC: 4 MMOL/L (ref 7–16)
AST SERPL-CCNC: 18 U/L (ref 15–37)
BASOPHILS # BLD: 0 K/UL (ref 0–0.2)
BASOPHILS NFR BLD: 1 % (ref 0–2)
BILIRUB DIRECT SERPL-MCNC: 0.1 MG/DL
BILIRUB SERPL-MCNC: 0.2 MG/DL (ref 0.2–1.1)
BUN SERPL-MCNC: 23 MG/DL (ref 8–23)
CALCIUM SERPL-MCNC: 8.1 MG/DL (ref 8.3–10.4)
CHLORIDE SERPL-SCNC: 107 MMOL/L (ref 98–107)
CO2 SERPL-SCNC: 30 MMOL/L (ref 21–32)
CREAT SERPL-MCNC: 0.96 MG/DL (ref 0.8–1.5)
DIFFERENTIAL METHOD BLD: ABNORMAL
EOSINOPHIL # BLD: 0.1 K/UL (ref 0–0.8)
EOSINOPHIL NFR BLD: 2 % (ref 0.5–7.8)
ERYTHROCYTE [DISTWIDTH] IN BLOOD BY AUTOMATED COUNT: 13.8 % (ref 11.9–14.6)
GLOBULIN SER CALC-MCNC: 3.3 G/DL (ref 2.3–3.5)
GLUCOSE SERPL-MCNC: 84 MG/DL (ref 65–100)
HCT VFR BLD AUTO: 29.3 % (ref 41.1–50.3)
HGB BLD-MCNC: 8.9 G/DL (ref 13.6–17.2)
IMM GRANULOCYTES # BLD AUTO: 0 K/UL (ref 0–0.5)
IMM GRANULOCYTES NFR BLD AUTO: 0 % (ref 0–5)
LYMPHOCYTES # BLD: 0.3 K/UL (ref 0.5–4.6)
LYMPHOCYTES NFR BLD: 9 % (ref 13–44)
MCH RBC QN AUTO: 27.4 PG (ref 26.1–32.9)
MCHC RBC AUTO-ENTMCNC: 30.4 G/DL (ref 31.4–35)
MCV RBC AUTO: 90.2 FL (ref 79.6–97.8)
MONOCYTES # BLD: 0.4 K/UL (ref 0.1–1.3)
MONOCYTES NFR BLD: 12 % (ref 4–12)
NEUTS SEG # BLD: 2.8 K/UL (ref 1.7–8.2)
NEUTS SEG NFR BLD: 77 % (ref 43–78)
NRBC # BLD: 0 K/UL (ref 0–0.2)
PLATELET # BLD AUTO: 213 K/UL (ref 150–450)
PMV BLD AUTO: 8.8 FL (ref 9.4–12.3)
POTASSIUM SERPL-SCNC: 3.3 MMOL/L (ref 3.5–5.1)
PROT SERPL-MCNC: 5.8 G/DL (ref 6.3–8.2)
RBC # BLD AUTO: 3.25 M/UL (ref 4.23–5.6)
SODIUM SERPL-SCNC: 141 MMOL/L (ref 136–145)
WBC # BLD AUTO: 3.6 K/UL (ref 4.3–11.1)

## 2021-05-13 PROCEDURE — 87077 CULTURE AEROBIC IDENTIFY: CPT

## 2021-05-13 PROCEDURE — 99152 MOD SED SAME PHYS/QHP 5/>YRS: CPT

## 2021-05-13 PROCEDURE — 80048 BASIC METABOLIC PNL TOTAL CA: CPT

## 2021-05-13 PROCEDURE — 87186 SC STD MICRODIL/AGAR DIL: CPT

## 2021-05-13 PROCEDURE — 74011000258 HC RX REV CODE- 258: Performed by: HOSPITALIST

## 2021-05-13 PROCEDURE — 36415 COLL VENOUS BLD VENIPUNCTURE: CPT

## 2021-05-13 PROCEDURE — 74011250637 HC RX REV CODE- 250/637: Performed by: HOSPITALIST

## 2021-05-13 PROCEDURE — 74011250636 HC RX REV CODE- 250/636: Performed by: RADIOLOGY

## 2021-05-13 PROCEDURE — 80076 HEPATIC FUNCTION PANEL: CPT

## 2021-05-13 PROCEDURE — 87205 SMEAR GRAM STAIN: CPT

## 2021-05-13 PROCEDURE — C1729 CATH, DRAINAGE: HCPCS

## 2021-05-13 PROCEDURE — 74011250636 HC RX REV CODE- 250/636: Performed by: HOSPITALIST

## 2021-05-13 PROCEDURE — 74011000250 HC RX REV CODE- 250: Performed by: HOSPITALIST

## 2021-05-13 PROCEDURE — 0W9J30Z DRAINAGE OF PELVIC CAVITY WITH DRAINAGE DEVICE, PERCUTANEOUS APPROACH: ICD-10-PCS | Performed by: RADIOLOGY

## 2021-05-13 PROCEDURE — APPNB30 APP NON BILLABLE TIME 0-30 MINS: Performed by: NURSE PRACTITIONER

## 2021-05-13 PROCEDURE — 65270000029 HC RM PRIVATE

## 2021-05-13 PROCEDURE — 74011000250 HC RX REV CODE- 250: Performed by: RADIOLOGY

## 2021-05-13 PROCEDURE — 85025 COMPLETE CBC W/AUTO DIFF WBC: CPT

## 2021-05-13 RX ORDER — SODIUM CHLORIDE 9 MG/ML
25 INJECTION, SOLUTION INTRAVENOUS CONTINUOUS
Status: DISCONTINUED | OUTPATIENT
Start: 2021-05-13 | End: 2021-05-17 | Stop reason: HOSPADM

## 2021-05-13 RX ORDER — METRONIDAZOLE 500 MG/100ML
500 INJECTION, SOLUTION INTRAVENOUS EVERY 8 HOURS
Status: DISCONTINUED | OUTPATIENT
Start: 2021-05-13 | End: 2021-05-15 | Stop reason: HOSPADM

## 2021-05-13 RX ORDER — LIDOCAINE HYDROCHLORIDE 20 MG/ML
40-120 INJECTION, SOLUTION INFILTRATION; PERINEURAL ONCE
Status: COMPLETED | OUTPATIENT
Start: 2021-05-13 | End: 2021-05-13

## 2021-05-13 RX ORDER — MIDAZOLAM HYDROCHLORIDE 1 MG/ML
.25-2 INJECTION, SOLUTION INTRAMUSCULAR; INTRAVENOUS
Status: DISCONTINUED | OUTPATIENT
Start: 2021-05-13 | End: 2021-05-17 | Stop reason: HOSPADM

## 2021-05-13 RX ORDER — FENTANYL CITRATE 50 UG/ML
12.5-1 INJECTION, SOLUTION INTRAMUSCULAR; INTRAVENOUS
Status: DISCONTINUED | OUTPATIENT
Start: 2021-05-13 | End: 2021-05-17 | Stop reason: HOSPADM

## 2021-05-13 RX ADMIN — OLANZAPINE 7.5 MG: 5 TABLET, FILM COATED ORAL at 22:14

## 2021-05-13 RX ADMIN — Medication 1 AMPULE: at 07:19

## 2021-05-13 RX ADMIN — METRONIDAZOLE 500 MG: 500 INJECTION, SOLUTION INTRAVENOUS at 17:56

## 2021-05-13 RX ADMIN — MESALAMINE 1000 MG: 1000 SUPPOSITORY RECTAL at 21:00

## 2021-05-13 RX ADMIN — CEFTRIAXONE 1 G: 1 INJECTION, POWDER, FOR SOLUTION INTRAMUSCULAR; INTRAVENOUS at 16:36

## 2021-05-13 RX ADMIN — LEVOTHYROXINE SODIUM 125 MCG: 0.12 TABLET ORAL at 07:19

## 2021-05-13 RX ADMIN — FENTANYL CITRATE 50 MCG: 50 INJECTION, SOLUTION INTRAMUSCULAR; INTRAVENOUS at 13:00

## 2021-05-13 RX ADMIN — MIDAZOLAM 1 MG: 1 INJECTION INTRAMUSCULAR; INTRAVENOUS at 12:57

## 2021-05-13 RX ADMIN — Medication 10 ML: at 22:00

## 2021-05-13 RX ADMIN — LIDOCAINE HYDROCHLORIDE 90 MG: 20 INJECTION, SOLUTION INFILTRATION; PERINEURAL at 13:07

## 2021-05-13 RX ADMIN — Medication 1 AMPULE: at 22:14

## 2021-05-13 RX ADMIN — Medication 10 ML: at 05:10

## 2021-05-13 RX ADMIN — MIDAZOLAM 1 MG: 1 INJECTION INTRAMUSCULAR; INTRAVENOUS at 13:00

## 2021-05-13 RX ADMIN — METRONIDAZOLE 500 MG: 500 INJECTION, SOLUTION INTRAVENOUS at 23:52

## 2021-05-13 RX ADMIN — AMITRIPTYLINE HYDROCHLORIDE 100 MG: 25 TABLET, FILM COATED ORAL at 22:14

## 2021-05-13 RX ADMIN — DEXTROSE MONOHYDRATE AND SODIUM CHLORIDE 75 ML/HR: 5; .45 INJECTION, SOLUTION INTRAVENOUS at 04:00

## 2021-05-13 RX ADMIN — FENTANYL CITRATE 50 MCG: 50 INJECTION, SOLUTION INTRAMUSCULAR; INTRAVENOUS at 12:57

## 2021-05-13 RX ADMIN — SODIUM CHLORIDE 25 ML/HR: 900 INJECTION, SOLUTION INTRAVENOUS at 12:57

## 2021-05-13 RX ADMIN — CLONAZEPAM 6 MG: 1 TABLET ORAL at 22:14

## 2021-05-13 RX ADMIN — MESALAMINE 1000 MG: 1000 SUPPOSITORY RECTAL at 07:25

## 2021-05-13 NOTE — PROGRESS NOTES
[facility] Hospitalist Progress Note     Name:  Tommy Foster  Age:75 y.o. Sex:male   :  1945    MRN:  [id number]     Admit Date:  2021    Reason for Admission:  Small bowel obstruction [facility]) [address] [place] [address] Course/Interval history:     Patient is a 47-year-old male with past medical history significant for rectal cancer, colostomy, anxiety/depression, hypothyroidism, diversion proctitis, multiple abdominal surgeries, presented to the ER because of nausea vomiting and cramping abdominal pain. CT abdomen and pelvis with contrast concerning for right presacral tumor recurrence and small bowel obstruction. Dilatation of intrahepatic biliary ducts and pancreatic duct also noticed. General surgery consulted. Oncology consulted. Patient has bilateral hydronephrosis therefore urology consulted. 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 Patient has intrapelvic abscess which was drained. Cultures sent. Currently on ceftriaxone. Added Flagyl. Subjective (21): Patient was seen after procedure today. No fever no chills. Abdominal pain improved. Ostomy output improved. Review of Systems: 14 point review of systems is otherwise negative with the exception of the elements mentioned above. Assessment & Plan     This is a 47-year-old male with    Small bowel obstruction present on admission  Conservative management per general surgery. Sips of fluids started.  started on clear liquid diet. Advance diet as tolerated. Intrapelvic abscess   IR consulted. Patient had drainage of the abscess today. Culture sent. Currently on ceftriaxone. Added Flagyl. Urinary tract infection POA  Urine cultures  positive for Gram negative rods. Continue ceftriaxone. Bilateral mild hydronephrosis R> L   Renal function within normal limits. Urology consulted. Appreciate recommendations. Recs conservative management.  No acute intervention as renal function is normal.     ?  Recurrent rectal cancer  Oncology consulted. Dilated intrahepatic bile duct and pancreatic ducts  LFTs within normal limits. Anxiety/depression  Continue home medications. Hypothyroidism  Levothyroxine    History of diversion proctitis  Mesalamine suppository. Severe Protein calorie malnutrition POA  Nutrition on board. Diet:  DIET FULL LIQUID  DVT PPx: Lovenox  Code status: Full Code  Disposition/Expected LOS: Anticipate discharge home in 24- 48 hours. Objective:     Patient Vitals for the past 24 hrs:   Temp Pulse Resp BP SpO2   05/13/21 0744 97.4 °F (36.3 °C) 78 18 122/80 94 %   05/13/21 0259 98 °F (36.7 °C) 77 18 128/75 97 %   05/12/21 2245 97.9 °F (36.6 °C) 82 18 135/72 97 %   05/12/21 1911 97.3 °F (36.3 °C) 60 18 111/69 98 %     Oxygen Therapy  O2 Sat (%): 94 % (05/13/21 0744)  Pulse via Oximetry: 84 beats per minute (05/11/21 0900)  O2 Device: None (Room air) (05/11/21 1529)    Body mass index is 17.49 kg/m².     Physical Exam:   General:  Elderly male, chronically ill-appearing, no acute distress, speaking in full sentences, no use of accessory muscles   Lungs:  Clear to auscultation bilaterally   CV:  Regular rate and rhythm with normal S1 and S2   Abdomen:  Soft, nontender, nondistended, hyperactive bowel sounds, no organomegaly,  Extremities:  No cyanosis clubbing or edema   Neuro:  Nonfocal, A&O x3   Psych:  Normal affect     Data Review:  I have reviewed all labs, meds, and studies from the last 24 hours:    Labs:    Recent Results (from the past 24 hour(s))   CBC WITH AUTOMATED DIFF    Collection Time: 05/13/21  4:50 AM   Result Value Ref Range    WBC 3.6 (L) 4.3 - 11.1 K/uL    RBC 3.25 (L) 4.23 - 5.6 M/uL    HGB 8.9 (L) 13.6 - 17.2 g/dL    HCT 29.3 (L) 41.1 - 50.3 %    MCV 90.2 79.6 - 97.8 FL    MCH 27.4 26.1 - 32.9 PG    MCHC 30.4 (L) 31.4 - 35.0 g/dL    RDW 13.8 11.9 - 14.6 %    PLATELET 612 136 - 372 K/uL    MPV 8.8 (L) 9.4 - 12.3 FL    ABSOLUTE NRBC 0. 00 0.0 - 0.2 K/uL    DF AUTOMATED      NEUTROPHILS 77 43 - 78 %    LYMPHOCYTES 9 (L) 13 - 44 %    MONOCYTES 12 4.0 - 12.0 %    EOSINOPHILS 2 0.5 - 7.8 %    BASOPHILS 1 0.0 - 2.0 %    IMMATURE GRANULOCYTES 0 0.0 - 5.0 %    ABS. NEUTROPHILS 2.8 1.7 - 8.2 K/UL    ABS. LYMPHOCYTES 0.3 (L) 0.5 - 4.6 K/UL    ABS. MONOCYTES 0.4 0.1 - 1.3 K/UL    ABS. EOSINOPHILS 0.1 0.0 - 0.8 K/UL    ABS. BASOPHILS 0.0 0.0 - 0.2 K/UL    ABS. IMM. GRANS. 0.0 0.0 - 0.5 K/UL   METABOLIC PANEL, BASIC    Collection Time: 05/13/21  4:50 AM   Result Value Ref Range    Sodium 141 136 - 145 mmol/L    Potassium 3.3 (L) 3.5 - 5.1 mmol/L    Chloride 107 98 - 107 mmol/L    CO2 30 21 - 32 mmol/L    Anion gap 4 (L) 7 - 16 mmol/L    Glucose 84 65 - 100 mg/dL    BUN 23 8 - 23 MG/DL    Creatinine 0.96 0.8 - 1.5 MG/DL    GFR est AA >60 >60 ml/min/1.73m2    GFR est non-AA >60 >60 ml/min/1.73m2    Calcium 8.1 (L) 8.3 - 10.4 MG/DL   HEPATIC FUNCTION PANEL    Collection Time: 05/13/21  4:50 AM   Result Value Ref Range    Protein, total 5.8 (L) 6.3 - 8.2 g/dL    Albumin 2.5 (L) 3.2 - 4.6 g/dL    Globulin 3.3 2.3 - 3.5 g/dL    A-G Ratio 0.8 (L) 1.2 - 3.5      Bilirubin, total 0.2 0.2 - 1.1 MG/DL    Bilirubin, direct 0.1 <0.4 MG/DL    Alk. phosphatase 69 50 - 136 U/L    AST (SGOT) 18 15 - 37 U/L    ALT (SGPT) 20 12 - 65 U/L       All Micro Results     Procedure Component Value Units Date/Time    CULTURE, URINE [[id number]]  (Abnormal) Collected: 05/11/21 1248    Order Status: Completed Specimen: Cath Urine Updated: 05/13/21 0752     Special Requests: NO SPECIAL REQUESTS        Culture result:       >100,000 COLONIES/mL GRAM NEGATIVE RODS IDENTIFICATION AND SUSCEPTIBILITY TO FOLLOW                  10,000 to 50,000 COLONIES/mL MIXED SKIN DEMARCO ISOLATED                EKG Results     None          Other Studies:  Ct Guide Cath/perc Drain Periton/retroperi Fluid W Si    Result Date: 5/13/2021  Title: CT guided percutaneous abscess drain placement.  Indication: Rectal carcinoma Consent: Informed written and oral consent was obtained from the patient after explanation of benefits and risks (including, but not limited to: Infection, Hemorrhage, Visceral Injury). The patient's questions were answered to their satisfaction. The patient stated understanding and requested that we proceed. Technique: All CT scans at this facility are performed using dose reduction/dose modulation techniques, as appropriate the performed exam, including the following:  Automated Exposure Control; Adjustment of the mA and/or kV according to patient size (this includes techniques or standardized protocols for targeted exams where dose is matched to indication/reason for exam); and Use of Iterative Reconstruction Technique. Procedure:  Sterile barrier technique (including:  cap, mask, sterile gloves, sterile sheet, hand hygiene, and chlorhexidene for cutaneous antisepsis) were used. With the patient prone a preliminary CT scan through the pelvis was performed with radio-opaque markers on the skin. Following routine prep and drape of the gluteal region, a local field block with 1% lidocaine was achieved. Using intermittent CT technique, a Yueh needle was advanced into the abscess cavity. Purulent fluid was returned and sent as specimen. Using intermittent CT technique, the needle was exchanged over an Amplatz wire for a 8 [employer] multipurpose drain. A total of 3 cc of fluid was removed. The catheter was secured with a suture and StatLok device. A suction bulb was attached. A final CT scan was performed confirming appropriate drain location. The dominant portion of the abscess is cephalad to the current drain. If this is not draining well, it may need to be repositioned under fluoroscopy a follow-up. Specimen:  Sent to [facility]. Complications: None. Contrast: None. Radiation Exposure Indices:  Total Exam DLP = 431 mGy-cm Medications:  Under physician supervision, 21 minutes of moderate intravenous conscious sedation was performed by administering Versed, Fentanyl intravenously. Continuous pulse oximetry, heart rate, and blood pressure monitoring was performed with an independent, trained observer present. Findings: Presacral pelvic abscess which tracks cephalad centrally within the pelvis. The dominant portion measures approximately 3 x 4 cm in diameter. The dominant portion is not amenable to direct access with CT given the surrounding structures. Uncomplicated CT-guided abscess drain placement, 8 [facility], into a presacral abscess. The dominant portion of the abscess centrally in the pelvis is not amenable to direct access. If this portion does not drain well, he will need drain repositioning under fluoroscopic guidance at follow-up. Defer to oncology for imaging follow-up to exclude recurrent malignancy.       Current Meds:   Current Facility-Administered Medications   Medication Dose Route Frequency    metroNIDAZOLE (FLAGYL) IVPB premix 500 mg  500 mg IntraVENous Q8H    dextrose 5 % - 0.45% NaCl infusion  75 mL/hr IntraVENous CONTINUOUS    clonazePAM (KlonoPIN) tablet 6 mg  6 mg Oral QHS    levothyroxine (SYNTHROID) tablet 125 mcg  125 mcg Oral ACB    sodium chloride (NS) flush 5-40 mL  5-40 mL IntraVENous Q8H    sodium chloride (NS) flush 5-40 mL  5-40 mL IntraVENous PRN    ondansetron (ZOFRAN) injection 4 mg  4 mg IntraVENous Q6H PRN    enoxaparin (LOVENOX) injection 40 mg  40 mg SubCUTAneous DAILY    morphine injection 0.5 mg  0.5 mg IntraVENous Q4H PRN    acetaminophen (TYLENOL) suppository 650 mg  650 mg Rectal Q4H PRN    cefTRIAXone (ROCEPHIN) 1 g in 0.9% sodium chloride (MBP/ADV) 50 mL MBP  1 g IntraVENous Q24H    famotidine (PF) (PEPCID) 20 mg in 0.9% sodium chloride 10 mL injection  20 mg IntraVENous Q24H    mesalamine (CANASA) suppository 1,000 mg  1 Suppository Rectal Q12H    alcohol 62% (NOZIN) nasal  1 Ampule  1 Ampule Topical Q12H    amitriptyline (ELAVIL) tablet 100 mg  100 mg Oral QHS    OLANZapine (ZyPREXA) tablet 7.5 mg  7.5 mg Oral QHS     Facility-Administered Medications Ordered in Other Encounters   Medication Dose Route Frequency    0.9% sodium chloride infusion  25 mL/hr IntraVENous CONTINUOUS    fentaNYL citrate (PF) injection 12.5-100 mcg  12.5-100 mcg IntraVENous Multiple    midazolam (VERSED) injection 0.25-2 mg  0.25-2 mg IntraVENous Multiple       Problem List:  Hospital Problems as of 5/13/2021 Date Reviewed: 12/10/2020          Codes Class Noted - Resolved POA    Pelvic abscess in male [facility]) ICD-10-CM: K65.1  ICD-9-CM: 567.22  5/13/2021 - Present Unknown        Anxiety ICD-10-CM: F41.9  ICD-9-CM: 300.00  5/11/2021 - Present Unknown        Depression ICD-10-CM: F32.9  ICD-9-CM: 054  5/11/2021 - Present Unknown        UTI (urinary tract infection) ICD-10-CM: N39.0  ICD-9-CM: 599.0  5/11/2021 - Present Unknown        Bilateral hydronephrosis ICD-10-CM: N13.30  ICD-9-CM: 340  5/11/2021 - Present Unknown        Dilated intrahepatic bile duct ICD-10-CM: K83.8  ICD-9-CM: 576.8  5/11/2021 - Present Unknown        Pancreatic duct dilated ICD-10-CM: K86.89  ICD-9-CM: 577.8  5/11/2021 - Present Unknown        Hypothyroid ICD-10-CM: E03.9  ICD-9-CM: 244.9  6/14/2019 - Present Yes    Overview Signed 6/14/2019 12:12 AM by Ermias Salazar MD     stable w/med             Small bowel obstruction [facility]) ICD-10-CM: C00.238  ICD-9-CM: 560.9  3/30/2019 - Present Unknown                   Part of this note was written by using a voice dictation software and the note has been proof read but may still contain some grammatical/other typographical errors.     Signed By: Rohan Kothari MD   [facility] Hospitalist Service    May 13, 2021

## 2021-05-13 NOTE — PROGRESS NOTES
TRANSFER - OUT REPORT:    Verbal report given to Zach Mora RN(name) on Lo Carey  being transferred to [facility](unit) for routine progression of care       Report consisted of patients Situation, Background, Assessment and   Recommendations(SBAR). Information from the following report(s) SBAR, Kardex, Procedure Summary, Intake/Output and MAR was reviewed with the receiving nurse. Lines:   Peripheral IV 05/11/21 Left Antecubital (Active)   Site Assessment Clean, dry, & intact 05/13/21 0426   Phlebitis Assessment 0 05/13/21 0426   Infiltration Assessment 0 05/13/21 0426   Dressing Status Clean, dry, & intact 05/13/21 0426   Dressing Type Tape;Transparent 05/13/21 0426   Hub Color/Line Status Infusing 05/12/21 0345   Action Taken Other (comment) 05/12/21 0345   Alcohol Cap Used No 05/12/21 0825        Opportunity for questions and clarification was provided.       Patient transported with:   [employer]

## 2021-05-13 NOTE — PROGRESS NOTES
Problem: Falls - Risk of  Goal: *Absence of Falls  Description: Document Riley Reason Fall Risk and appropriate interventions in the flowsheet. Outcome: Progressing Towards Goal  Note: Fall Risk Interventions:            Medication Interventions: Patient to call before getting OOB         History of Falls Interventions:  Investigate reason for fall, Room close to nurse's station, Evaluate medications/consider consulting pharmacy         Problem: Patient Education: Go to Patient Education Activity  Goal: Patient/Family Education  Outcome: Progressing Towards Goal

## 2021-05-13 NOTE — PROGRESS NOTES
END OF SHIFT NOTE:    Intake/Output  No intake/output data recorded. Voiding: YES  Catheter: NO  Drain:   Drain 8 Fr presacral abscess drain 05/13/21 Posterior;Right Hip (Active)   Site Assessment Clean, dry, & intact 05/13/21 1536   Dressing Status Clean, dry, & intact 05/13/21 1536   Status Draining 05/13/21 1536   Drainage Description Serosanguinous 05/13/21 1536               Stool:  0 occurrences. Stool Assessment  Stool Appearance: Loose (05/13/21 0744)    Emesis:  0 occurrences. VITAL SIGNS  Patient Vitals for the past 12 hrs:   Temp Pulse Resp BP SpO2   05/13/21 1623 97.9 °F (36.6 °C) (!) 58 18 (!) 168/80 97 %   05/13/21 0744 97.4 °F (36.3 °C) 78 18 122/80 94 %       Pain Assessment  Pain 1  Pain Scale 1: Numeric (0 - 10) (05/13/21 0744)  Pain Intensity 1: 0 (05/13/21 0744)  Patient Stated Pain Goal: 0 (05/13/21 0744)    Ambulating  Yes    Additional Information: IR for bx today returned with DIANE drain. Shift report given to oncoming nurse at the bedside.     Ralph Tolbert RN

## 2021-05-13 NOTE — PROGRESS NOTES
I have reviewed the CT for Mr. Marroquin. The possible presacral mass appears to be more consistent with a rim enhancing fluid collection, abscess, measuring 3.6 x 2.5 cm in diameter (Series 2, image 74). I discussed this with Magdi Soto from the surgical service who is in agreement to pursue percutaneous aspiration or drain. No clear target for biopsy. Would recommend continued follow up imaging to exclude tumor recurrence.      Angelika Rose MD  Interventional Radiology

## 2021-05-13 NOTE — PROGRESS NOTES
Comprehensive Nutrition Assessment    Type and Reason for Visit: Initial, Positive nutrition screen  Best Practice Alert for Malnutrition Screening Tool: Recently Lost Weight Without Trying: Yes, If Yes, How Much Weight Loss: 2-13 lbs, Eating Poorly Due to Decreased Appetite: Yes(fights depression)    Nutrition Recommendations/Plan:    Continue current diet   Start almond milk with all trays     Malnutrition Assessment:  Malnutrition Status: Severe malnutrition  Context: Social/environmental circumstances  Findings of clinical characteristics of malnutrition:   Energy Intake:  Mild decrease in energy intake (specify)(Reported no PO for 4 days)  Weight Loss:  Mild weight loass (specify amount and time period)(6% (5%) over last 6 months)     Body Fat Loss:  7 - Severe body fat loss, Fat overlying ribs   Muscle Mass Loss:  7 - Severe muscle mass loss, Clavicles (pectoralis &deltoids), Scapula (trapezius), Temples (temporalis)  Fluid Accumulation:  No significant fluid accumulation,     Strength:  Not performed     Nutrition Assessment:   Nutrition History: Patient reports no PO for last ~4 days due to nausea. He states he knows he is too thin, but states Genomargarita George has a lot going on. \" He further explains that he has been isolated since his ostomy and is being treated for depression. He states that he has no desire to eat. He also states that he is trying to transition to vegan due to [employer] documentaries he has been watching. When further questioned regarding this, he specifically refers to the raising methods and antibiotics that are given during raising. He states that he used to drink Ensure/Boost but stopped due to the amount of sugar and chemicals in them. He states he was drinking whey protein powder, but stopped because it comes from a cow. He states that usual intake recenlty has been a tofu [employer] wrap with hummus and 2 servings of fruit. Noted patient on TPN during admissions for SBO and EC fistula.  Appears to have lost ~15# during that time period. Cultural/Confucianism/Ethnic Food Preference(s): None   Nutrition Background: Patient with PMH signigicant for rectal cancer with colostomy, and chemo//xrt in 2014, and DVT. He presented with no output from ostomy and nausea/vomiting. CT abdomen showed SBO suspicious for recurrent presacral tumor and bilateral hydronephrosis. IR was consulted for Bx. He is now s/p IR procedure and found to have pelvic abscess s/p drain placement 5/13. Daily Update:  Patient seen following return from IR procedure. He states he has a lot going on and knows that he needs to gain weight. When RD trying to give options for non-animal based protein sources he again states he has a lot going on. When RD trying to reassure patient that sugar containing products are okay, he still denies that he is open to drinking them. He ademittly refuses nutrition supplements. When RD suggest almond or soy milk with meals, he agrees. RD recommended that he include more non-animal protein sources in usual intake.     Current Nutrition Therapies:  DIET FULL LIQUID    Current Intake:   Average Meal Intake: NPO(at time of RD assessment) Average Supplement Intake: None ordered      Anthropometric Measures:  Height: 5' 8\" (172.7 cm)  Current Body Wt: 52.2 kg (115 lb 1.3 oz)(5/13), Weight source: Not specified  BMI: 17.5, Underweight (BMI less than 18.5)     Ideal Body Weight (lbs) (Calculated): 154 lbs (70 kg), 74.7 %  Usual Body Wt: 55 kg (121 lb 4.1 oz)(last office visit 12/2020), Percent weight change: -5.1          Edema: No data recorded   Estimated Daily Nutrient Needs:  Energy (kcal/day): 2169-6772 (Kcal/kg(30-35), Weight Used: Current(52.2 kg (5/13)))  Protein (g/day): 68-78 (1.3-1.5 g/kg) Weight Used: (Current)  Fluid (ml/day):   (1 ml/kcal)    Nutrition Diagnosis:   · Inadequate oral intake related to (poor appetite, preferences) as evidenced by (reported barriers to PO, recall)    · Severe malnutrition, In context of social or environmental circumstances related to (depression and preferences for meals) as evidenced by severe loss of subcutaneous fat, severe muscle loss(reported barriers to PO)    Nutrition Interventions:   Food and/or Nutrient Delivery: Continue current diet([place] or soy milk with all meals)     Coordination of Nutrition Care: Continue to monitor while inpatient  Plan of Care discussed with Moni Chen RN    Goals: Active Goal: Meet at least 75% nutrition needs with all meals    Nutrition Monitoring and Evaluation:      Food/Nutrient Intake Outcomes: Food and nutrient intake       Discharge Planning:     Too soon to determine    [address] [place], LD on 5/13/2021 at 4:01 PM  Contact: [phone]

## 2021-05-13 NOTE — CONSULTS
[facility]  [place], [address] [facility]  [phone]    Annamaria Orf Comin  : 1945     HPI   66 y.o., male seen in consultation for bilateral hydronephrosis. Pt admitted for pSBO which is resolving. History of rectal cancer (chemoradiation). Recent CT on 21 shows mild R>L hydronephrosis with stable non obstructing renal stones. Dilated loops of bowel present with new presacral mass. IR planning to biopsy this in am.  Pt reports min LUTS including nocturia times one. Denies flank pain, UTI or hematuria. Cr is 0.86 today.     Past Medical History:   Diagnosis Date    Acute deep vein thrombosis (DVT) of non-extremity vein 2019    Family history of malignant neoplasm of gastrointestinal tract     mother colon/ovarian cancer 67    Fecal incontinence     LAR syndrome    Former cigarette smoker     History of rectal cancer     Hypothyroid     stable w/med    Infection and inflammatory reaction due to other internal prosthetic devices, implants and grafts, subsequent encounter     abd wound/mesh colostomy    Insomnia     takes meds    Major depression     Osteoarthritis, hand     Personal history of colonic polyps 9/2013    x 1    Personal history of malignant neoplasm of rectum, rectosigmoid junction, and anus 2013    Psychiatric disorder     anxiety     Past Surgical History:   Procedure Laterality Date    COLONOSCOPY N/A 2018    COLONOSCOPY / BMI=21 performed by Mary Sarmiento MD at [facility] N/A 3/5/2021    COLONOSCOPY/BMI 19 performed by Katty Olsen MD at [address]  2018         COLONOSCOPY THRU STOMA,LILLIAN RMVL W/SNARE  3/5/2021         ENDOSCOPY, COLON, DIAGNOSTIC  last 2/3/15    Emily--no polyps--3 year recall    HX COLOSTOMY  16    HX GI  2016    Sacral nerve stimlator lead trial (unsucessful) and removal    HX HERNIA REPAIR  3/2015, 2016    HX HERNIA REPAIR      and Colostomy in May    HX OTHER SURGICAL  10/7/2013    Emily---endorectal us    HX OTHER SURGICAL Bilateral     cataracts  2017    HX POLYPECTOMY      HX TOTAL COLECTOMY  11/2013    Emily--lap LAR with coloproctostomy, mobilization splenic flexure, diverting loop ileostomy    HX TOTAL COLECTOMY Right 8/2014    for leak    HX VASCULAR ACCESS Left 4/14    single lumen port/subsequently removed    IR INSERT TUNL CVC W/O PORT OVER 5 YR  5/20/2019    IR REPLACE CVC TUNNELED W/O PORT  5/30/2019     Current Facility-Administered Medications   Medication Dose Route Frequency Provider Last Rate Last Admin    dextrose 5 % - 0.45% NaCl infusion  75 mL/hr IntraVENous CONTINUOUS Ashli Prado MD 75 mL/hr at 05/12/21 1034 75 mL/hr at 05/12/21 1034    clonazePAM (KlonoPIN) tablet 6 mg  6 mg Oral QHS Ashli Prado MD        levothyroxine (SYNTHROID) tablet 125 mcg  125 mcg Oral ACB Ashli Prado MD   125 mcg at 05/12/21 1052    sodium chloride (NS) flush 5-40 mL  5-40 mL IntraVENous Q8H Ashli Prado MD   10 mL at 05/12/21 1410    sodium chloride (NS) flush 5-40 mL  5-40 mL IntraVENous PRN Ashli Prado MD        ondansetron (ZOFRAN) injection 4 mg  4 mg IntraVENous Q6H PRN Ashli Prado MD   4 mg at 05/12/21 0525    enoxaparin (LOVENOX) injection 40 mg  40 mg SubCUTAneous DAILY Ashli Prado MD   40 mg at 05/12/21 0838    morphine injection 0.5 mg  0.5 mg IntraVENous Q4H PRN Ashli Prado MD        acetaminophen (TYLENOL) suppository 650 mg  650 mg Rectal Q4H PRN Ashli Prado MD        cefTRIAXone (ROCEPHIN) 1 g in 0.9% sodium chloride (MBP/ADV) 50 mL MBP  1 g IntraVENous Q24H Alvaro Prado  mL/hr at 05/12/21 1915 1 g at 05/12/21 1915    famotidine (PF) (PEPCID) 20 mg in 0.9% sodium chloride 10 mL injection  20 mg IntraVENous Q24H Alvaro Prado MD   20 mg at 05/12/21 1409    mesalamine (CANASA) suppository 1,000 mg  1 Suppository Rectal Q12H Ashli Prado MD   1,000 mg at 05/12/21 0834    alcohol 62% (NOZIN) nasal  1 Ampule  1 Ampule Topical Q12H Stephani Prado MD   1 Ampule at 21 0838    amitriptyline (ELAVIL) tablet 100 mg  100 mg Oral QHS Alvaro Prado MD   100 mg at 21 2213    OLANZapine (ZyPREXA) tablet 7.5 mg  7.5 mg Oral QHS Alvaro Prado MD   7.5 mg at 21 2200     No Known Allergies  Social History     Socioeconomic History    Marital status: SINGLE     Spouse name: Not on file    Number of children: Not on file    Years of education: Not on file    Highest education level: Not on file   Occupational History    Not on file   Social Needs    Financial resource strain: Not on file    Food insecurity     Worry: Not on file     Inability: Not on file    Transportation needs     Medical: Not on file     Non-medical: Not on file   Tobacco Use    Smoking status: Former Smoker     Packs/day: 1.00     Years: 4.00     Pack years: 4.00     Types: Cigarettes     Quit date: 1963     Years since quittin.5    Smokeless tobacco: Never Used   Substance and Sexual Activity    Alcohol use: Not Currently     Alcohol/week: 11.7 standard drinks     Types: 14 Cans of beer per week     Comment: advised stop drinking given issues    Drug use: No    Sexual activity: Never   Lifestyle    Physical activity     Days per week: Not on file     Minutes per session: Not on file    Stress: Not on file   Relationships    Social connections     Talks on phone: Not on file     Gets together: Not on file     Attends Yazdanism service: Not on file     Active member of club or organization: Not on file     Attends meetings of clubs or organizations: Not on file     Relationship status: Not on file    Intimate partner violence     Fear of current or ex partner: Not on file     Emotionally abused: Not on file     Physically abused: Not on file     Forced sexual activity: Not on file   Other Topics Concern    Not on file   Social History Narrative    Not on file     Family History Problem Relation Age of Onset    Cancer Mother         colon and ovarian    Cancer Brother         prostate    Alcohol abuse Brother     Cancer Maternal Grandmother         bone    Alcohol abuse Father     Alcohol abuse Sister     Stroke Paternal Grandfather        Review of Systems  All systems reviewed and are negative at this time. Physical Exam  Visit Vitals  /69 (BP 1 Location: Right upper arm, BP Patient Position: At rest;Lying)   Pulse 60   Temp 97.3 °F (36.3 °C)   Resp 18   Ht 5' 8\" (1.727 m)   Wt 115 lb (52.2 kg)   SpO2 98%   BMI 17.49 kg/m²     General appearance - alert, well appearing, and in no distress  Mental status - alert and oriented  Eyes - extraocular eye movements intact, sclera anicteric  Nose - normal and patent, no erythema or discharge  Mouth - mucous membranes moist  Chest - clear to auscultation bilaterally  Heart - normal rate, regular rhythm  Abdomen - soft, ND. Stoma pink  Neurological - normal speech, no focal findings or movement disorder noted  Skin - normal coloration and turgor    Cr 0.86. Ucx pending. Assessment/Plan    ICD-10-CM ICD-9-CM    1. Intestinal obstruction, unspecified cause, unspecified whether partial or complete ([address]  K56.609 560.9    2. Small bowel obstruction ([address]  K56.609 560.9    3. Hydroureter, right  N13.4 593.5    4. History of rectal cancer  Z85.048 V10.06    5. Pelvic mass  R19.00 789.30    6. Pancreatic duct dilated  K86.89 577.8      New onset R>L mild hydronephrosis. Pt denies any flank pain and renal function remains WNL. This may be related to his pSBO picture. I recommended observation. Pt instructed to call for flank pain or worsening renal function. Cont Rocephin. Await final Ucx.     Danielle Solis,

## 2021-05-13 NOTE — PROCEDURES
Department of Interventional Radiology  [phone]        Interventional Radiology Brief Procedure Note    Patient: Caresse Carrel MRN: [id number]  SSN: [id number]    YOB: 1945  Age: 66 y.o. Sex: male      Date of Procedure: 5/13/2021    Pre-Procedure Diagnosis: Pelvic abscess    Post-Procedure Diagnosis: SAME    Procedure(s): Image Guided Drain Placement    Brief Description of Procedure: CT guided drain placement, 8 Fr, into a pelvic abscess. 3 ml of purulent fluid drained, sent for culture. Only the inferior aspect of the abscess was able to be targeted with CT as the dominant portion is more central in the pelvis. If this portion does not drain well, would need drain re-positioning under fluoroscopy at follow up. Recommend abscessogram in 2 weeks. Defer to oncology for follow up imaging to exclude tumor. Performed By: Jem De Los Santos MD     Assistants: None    Anesthesia:Moderate Sedation    Estimated Blood Loss: Less than 10ml    Specimens:  Microbiology    Implants:   All Purpose Drain    Findings: As above    Complications: None    Signed By: Jem De Los Santos MD     May 13, 2021

## 2021-05-13 NOTE — PROGRESS NOTES
Surgery Consult      Patient: Parul Marroquin 66 y.o. male     Consulting Physician:  ER/hospitalist    Chief Complaint: SBO    Subjective:      Parker Garcia is a 66 y.o. male who surgery has been asked to see for evaluation of a recurrent bowel obstruction. He is well known to surgery, having an extremely complicated course since diagnosis of rectal cancer in 2013 with neoadjuvant chemoradiation, with complete response, followed by definitive surgery and completion chemotherapy. He has subsequently had numerous surgical complications after stoma closure to include anastomotic leak(s), wound infections, hernia and mesh infection, and fistula formation. He has been successfully treated for SBO on several occasions. He had followed with Dr Pavel Larsen but has not been seen since 2019. He presented today to the ER due to abdominal pain yesterday with no stoma output for 24 hours. He has not had n/v, fever or chills. He does feel a little bloated. He has actually begun to have watery stoma output since arrival to ER.    5/12/21  Pt reports some nausea overnight. He has emptied his bag and now it is full of liquid stool output again. He reports abdominal cramping but is not in pain. AF, VSS, on room air. WBC 8.     5/13/21 Pt denies abdominal pain, N/V. Large amount of stool output from ostomy. Transferring downtown today for IR biopsy.      Past Medical History:   Diagnosis Date    Acute deep vein thrombosis (DVT) of non-extremity vein 5/20/2019    Family history of malignant neoplasm of gastrointestinal tract     mother colon/ovarian cancer 67    Fecal incontinence     LAR syndrome    Former cigarette smoker     History of rectal cancer     Hypothyroid     stable w/med    Infection and inflammatory reaction due to other internal prosthetic devices, implants and grafts, subsequent encounter 2017    abd wound/mesh colostomy    Insomnia     takes meds    Major depression     Osteoarthritis, hand     Personal history of colonic polyps 9/2013    x 1    Personal history of malignant neoplasm of rectum, rectosigmoid junction, and anus 9/2013    Psychiatric disorder     anxiety       Past Surgical History:   Procedure Laterality Date    COLONOSCOPY N/A 2/12/2018    COLONOSCOPY / BMI=21 performed by Mary Sarmiento MD at [place] [address]. N/A 3/5/2021    COLONOSCOPY/BMI 19 performed by Katty Olsen MD at [address] [place] [address]  2/12/2018         COLONOSCOPY THRU STOMA,LESN RMVL W/SNARE  3/5/2021         ENDOSCOPY, COLON, DIAGNOSTIC  last 2/3/15    Emily--no polyps--3 year recall    HX COLOSTOMY  5/11/16    HX GI  4/2016    Sacral nerve stimlator lead trial (unsucessful) and removal    HX HERNIA REPAIR  3/2015, 5/2016    HX HERNIA REPAIR      and Colostomy in May    HX OTHER SURGICAL  10/7/2013    Emily---endorectal us    HX OTHER SURGICAL Bilateral     cataracts  2017    HX POLYPECTOMY      HX TOTAL COLECTOMY  11/2013    Emily--lap LAR with coloproctostomy, mobilization splenic flexure, diverting loop ileostomy    HX TOTAL COLECTOMY Right 8/2014    for leak    HX VASCULAR ACCESS Left 4/14    single lumen port/subsequently removed    IR INSERT TUNL CVC W/O PORT OVER 5 YR  5/20/2019    IR REPLACE CVC TUNNELED W/O PORT  5/30/2019        Family History   Problem Relation Age of Onset    Cancer Mother         colon and ovarian    Cancer Brother         prostate    Alcohol abuse Brother     Cancer Maternal Grandmother         bone    Alcohol abuse Father     Alcohol abuse Sister     Stroke Paternal Grandfather        Social History     Socioeconomic History    Marital status: SINGLE     Spouse name: Not on file    Number of children: Not on file    Years of education: Not on file    Highest education level: Not on file   Tobacco Use    Smoking status: Former Smoker     Packs/day: 1.00     Years: 4.00     Pack years: 4.00     Types: Cigarettes Quit date: 1963     Years since quittin.5    Smokeless tobacco: Never Used   Substance and Sexual Activity    Alcohol use: Not Currently     Alcohol/week: 11.7 standard drinks     Types: 14 Cans of beer per week     Comment: advised stop drinking given issues    Drug use: No    Sexual activity: Never        Current Facility-Administered Medications   Medication Dose Route Frequency    dextrose 5 % - 0.45% NaCl infusion  75 mL/hr IntraVENous CONTINUOUS    clonazePAM (KlonoPIN) tablet 6 mg  6 mg Oral QHS    levothyroxine (SYNTHROID) tablet 125 mcg  125 mcg Oral ACB    sodium chloride (NS) flush 5-40 mL  5-40 mL IntraVENous Q8H    sodium chloride (NS) flush 5-40 mL  5-40 mL IntraVENous PRN    ondansetron (ZOFRAN) injection 4 mg  4 mg IntraVENous Q6H PRN    enoxaparin (LOVENOX) injection 40 mg  40 mg SubCUTAneous DAILY    morphine injection 0.5 mg  0.5 mg IntraVENous Q4H PRN    acetaminophen (TYLENOL) suppository 650 mg  650 mg Rectal Q4H PRN    cefTRIAXone (ROCEPHIN) 1 g in 0.9% sodium chloride (MBP/ADV) 50 mL MBP  1 g IntraVENous Q24H    famotidine (PF) (PEPCID) 20 mg in 0.9% sodium chloride 10 mL injection  20 mg IntraVENous Q24H    mesalamine (CANASA) suppository 1,000 mg  1 Suppository Rectal Q12H    alcohol 62% (NOZIN) nasal  1 Ampule  1 Ampule Topical Q12H    amitriptyline (ELAVIL) tablet 100 mg  100 mg Oral QHS    OLANZapine (ZyPREXA) tablet 7.5 mg  7.5 mg Oral QHS        No Known Allergies    Review of Systems:  Pertinent items are noted in the History of Present Illness. Has ongoing rectal discharge- using suppository from GI with good result  No cp, sob, wt loss.     Objective:        Patient Vitals for the past 8 hrs:   BP Temp Pulse Resp SpO2   21 0744 122/80 97.4 °F (36.3 °C) 78 18 94 %   21 0259 128/75 98 °F (36.7 °C) 77 18 97 %       Temp (24hrs), Av.6 °F (36.4 °C), Min:97.3 °F (36.3 °C), Max:98 °F (36.7 °C)      Physical Exam:  GENERAL: alert, cooperative, no distress, appears stated age, EYE: negative findings: anicteric sclera, THROAT & NECK: normal and mucous membranes dry, LUNG: clear to auscultation bilaterally, HEART: regular rate and rhythm, ABDOMEN: soft, non distended, nontender. Palpable suture under skin/scar in midline. Stoma pink, some significant amount of watery stool output in bag, EXTREMITIES:  extremities normal, atraumatic, no cyanosis or edema, SKIN: no rash or abnormalities, NEUROLOGIC: negative findings: alert, oriented x3  speech normal in context and clarity  memory intact grossly  cranial nerves II-XII intact, PSYCHIATRIC: non focal      Labs:   Recent Results (from the past 24 hour(s))   CBC WITH AUTOMATED DIFF    Collection Time: 05/13/21  4:50 AM   Result Value Ref Range    WBC 3.6 (L) 4.3 - 11.1 K/uL    RBC 3.25 (L) 4.23 - 5.6 M/uL    HGB 8.9 (L) 13.6 - 17.2 g/dL    HCT 29.3 (L) 41.1 - 50.3 %    MCV 90.2 79.6 - 97.8 FL    MCH 27.4 26.1 - 32.9 PG    MCHC 30.4 (L) 31.4 - 35.0 g/dL    RDW 13.8 11.9 - 14.6 %    PLATELET 120 717 - 676 K/uL    MPV 8.8 (L) 9.4 - 12.3 FL    ABSOLUTE NRBC 0.00 0.0 - 0.2 K/uL    DF AUTOMATED      NEUTROPHILS 77 43 - 78 %    LYMPHOCYTES 9 (L) 13 - 44 %    MONOCYTES 12 4.0 - 12.0 %    EOSINOPHILS 2 0.5 - 7.8 %    BASOPHILS 1 0.0 - 2.0 %    IMMATURE GRANULOCYTES 0 0.0 - 5.0 %    ABS. NEUTROPHILS 2.8 1.7 - 8.2 K/UL    ABS. LYMPHOCYTES 0.3 (L) 0.5 - 4.6 K/UL    ABS. MONOCYTES 0.4 0.1 - 1.3 K/UL    ABS. EOSINOPHILS 0.1 0.0 - 0.8 K/UL    ABS. BASOPHILS 0.0 0.0 - 0.2 K/UL    ABS. IMM.  GRANS. 0.0 0.0 - 0.5 K/UL   METABOLIC PANEL, BASIC    Collection Time: 05/13/21  4:50 AM   Result Value Ref Range    Sodium 141 136 - 145 mmol/L    Potassium 3.3 (L) 3.5 - 5.1 mmol/L    Chloride 107 98 - 107 mmol/L    CO2 30 21 - 32 mmol/L    Anion gap 4 (L) 7 - 16 mmol/L    Glucose 84 65 - 100 mg/dL    BUN 23 8 - 23 MG/DL    Creatinine 0.96 0.8 - 1.5 MG/DL    GFR est AA >60 >60 ml/min/1.73m2    GFR est non-AA >60 >60 ml/min/1.73m2    Calcium 8.1 (L) 8.3 - 10.4 MG/DL   HEPATIC FUNCTION PANEL    Collection Time: 05/13/21  4:50 AM   Result Value Ref Range    Protein, total 5.8 (L) 6.3 - 8.2 g/dL    Albumin 2.5 (L) 3.2 - 4.6 g/dL    Globulin 3.3 2.3 - 3.5 g/dL    A-G Ratio 0.8 (L) 1.2 - 3.5      Bilirubin, total 0.2 0.2 - 1.1 MG/DL    Bilirubin, direct 0.1 <0.4 MG/DL    Alk. phosphatase 69 50 - 136 U/L    AST (SGOT) 18 15 - 37 U/L    ALT (SGPT) 20 12 - 65 U/L        Data Review reviewed  CT   CT Results (most recent):  Results from Hospital Encounter encounter on 05/11/21   CT ABD PELV W CONT    Narrative CT ABDOMEN AND PELVIS WITH CONTRAST    HISTORY: Restaging rectal carcinoma    COMPARISON: 8/31/2020    TECHNIQUE: Helical imaging was performed from the lung bases through the ischial  tuberosities during the intravenous infusion of 100 cc of Isovue-370. Oral  contrast was administered. Coronal and sagittal reformats were performed. Dose reduction techniques used: Automated exposure control, adjustment of the  mAs and/or kVp according to patient's size, and iterative reconstruction  techniques. FINDINGS:  *  LUNG BASES: Coronary artery disease. *  LIVER: Within normal limits. *  GALLBLADDER AND BILE DUCTS: Normal. Mild intrahepatic biliary ductal  dilatation. *  SPLEEN: Within normal limits. *  URINARY TRACT: Left renal atrophy. Bilateral hydronephrosis of uncertain  etiology. *  ADRENALS: Within normal limits. *  PANCREAS: Dilatation of the pancreatic duct. *  GASTROINTESTINAL TRACT: Low anterior colon resection. New enhancing mass with  necrotic center to the right of the rectum in the presacral space measuring 3.4  x 2.0 cm. Small bowel obstruction containing fecal lies material.  *  RETROPERITONEUM: Within normal limits. *  PERITONEAL CAVITY AND ABDOMINAL WALL: Within normal limits. *  PELVIS: Within normal limits. *  SPINE / BONES: Within normal limits. *  OTHER COMMENTS: None.       Impression Findings worrisome for right presacral tumor recurrence. Consider obtaining a  PET/CT scan. Small bowel obstruction containing fecal-like material. This is worse when  compared to 8/31/2020. Bilateral hydronephrosis of uncertain etiology. Was not present on the prior  study. Dilatation of the intrahepatic biliary ducts and pancreatic duct. This is more  prominent on the study secondary to the use of intravenous contrast. Correlation  with LFTs is recommended.         Date of Dictation: 5/11/2021 10:10 AM            Assessment:     Mechanical SBO due to adhesions  H/o rectal cancer  Right hydroureter    Plan:     SBO resolved  ADAT   Will sign off    Signed By: BARBRA Montoya     May 13, 2021

## 2021-05-14 PROBLEM — E43 SEVERE PROTEIN-CALORIE MALNUTRITION (HCC): Status: ACTIVE | Noted: 2019-05-19

## 2021-05-14 LAB
ALBUMIN SERPL-MCNC: 2.6 G/DL (ref 3.2–4.6)
ALBUMIN/GLOB SERPL: 0.8 {RATIO} (ref 1.2–3.5)
ALP SERPL-CCNC: 72 U/L (ref 50–136)
ALT SERPL-CCNC: 26 U/L (ref 12–65)
ANION GAP SERPL CALC-SCNC: 5 MMOL/L (ref 7–16)
AST SERPL-CCNC: 23 U/L (ref 15–37)
BACTERIA SPEC CULT: ABNORMAL
BACTERIA SPEC CULT: ABNORMAL
BASOPHILS # BLD: 0 K/UL (ref 0–0.2)
BASOPHILS NFR BLD: 1 % (ref 0–2)
BILIRUB SERPL-MCNC: 0.2 MG/DL (ref 0.2–1.1)
BUN SERPL-MCNC: 24 MG/DL (ref 8–23)
CALCIUM SERPL-MCNC: 8.5 MG/DL (ref 8.3–10.4)
CHLORIDE SERPL-SCNC: 106 MMOL/L (ref 98–107)
CO2 SERPL-SCNC: 29 MMOL/L (ref 21–32)
CREAT SERPL-MCNC: 0.91 MG/DL (ref 0.8–1.5)
DIFFERENTIAL METHOD BLD: ABNORMAL
EOSINOPHIL # BLD: 0.1 K/UL (ref 0–0.8)
EOSINOPHIL NFR BLD: 1 % (ref 0.5–7.8)
ERYTHROCYTE [DISTWIDTH] IN BLOOD BY AUTOMATED COUNT: 13.3 % (ref 11.9–14.6)
GLOBULIN SER CALC-MCNC: 3.3 G/DL (ref 2.3–3.5)
GLUCOSE SERPL-MCNC: 78 MG/DL (ref 65–100)
HCT VFR BLD AUTO: 31.7 % (ref 41.1–50.3)
HGB BLD-MCNC: 9.7 G/DL (ref 13.6–17.2)
IMM GRANULOCYTES # BLD AUTO: 0 K/UL (ref 0–0.5)
IMM GRANULOCYTES NFR BLD AUTO: 0 % (ref 0–5)
LYMPHOCYTES # BLD: 0.3 K/UL (ref 0.5–4.6)
LYMPHOCYTES NFR BLD: 6 % (ref 13–44)
MCH RBC QN AUTO: 27.2 PG (ref 26.1–32.9)
MCHC RBC AUTO-ENTMCNC: 30.6 G/DL (ref 31.4–35)
MCV RBC AUTO: 88.8 FL (ref 79.6–97.8)
MONOCYTES # BLD: 0.5 K/UL (ref 0.1–1.3)
MONOCYTES NFR BLD: 11 % (ref 4–12)
NEUTS SEG # BLD: 3.5 K/UL (ref 1.7–8.2)
NEUTS SEG NFR BLD: 81 % (ref 43–78)
NRBC # BLD: 0 K/UL (ref 0–0.2)
PLATELET # BLD AUTO: 224 K/UL (ref 150–450)
PMV BLD AUTO: 8.8 FL (ref 9.4–12.3)
POTASSIUM SERPL-SCNC: 3.5 MMOL/L (ref 3.5–5.1)
PROT SERPL-MCNC: 5.9 G/DL (ref 6.3–8.2)
RBC # BLD AUTO: 3.57 M/UL (ref 4.23–5.6)
SERVICE CMNT-IMP: ABNORMAL
SODIUM SERPL-SCNC: 140 MMOL/L (ref 136–145)
WBC # BLD AUTO: 4.3 K/UL (ref 4.3–11.1)

## 2021-05-14 PROCEDURE — 65270000029 HC RM PRIVATE

## 2021-05-14 PROCEDURE — 74011250636 HC RX REV CODE- 250/636: Performed by: HOSPITALIST

## 2021-05-14 PROCEDURE — 97530 THERAPEUTIC ACTIVITIES: CPT

## 2021-05-14 PROCEDURE — 85025 COMPLETE CBC W/AUTO DIFF WBC: CPT

## 2021-05-14 PROCEDURE — 74011000250 HC RX REV CODE- 250: Performed by: HOSPITALIST

## 2021-05-14 PROCEDURE — 74011250637 HC RX REV CODE- 250/637: Performed by: HOSPITALIST

## 2021-05-14 PROCEDURE — 51798 US URINE CAPACITY MEASURE: CPT

## 2021-05-14 PROCEDURE — 80053 COMPREHEN METABOLIC PANEL: CPT

## 2021-05-14 PROCEDURE — 99232 SBSQ HOSP IP/OBS MODERATE 35: CPT | Performed by: INTERNAL MEDICINE

## 2021-05-14 PROCEDURE — 97165 OT EVAL LOW COMPLEX 30 MIN: CPT

## 2021-05-14 PROCEDURE — APPSS45 APP SPLIT SHARED TIME 31-45 MINUTES: Performed by: NURSE PRACTITIONER

## 2021-05-14 PROCEDURE — 97161 PT EVAL LOW COMPLEX 20 MIN: CPT

## 2021-05-14 PROCEDURE — 36415 COLL VENOUS BLD VENIPUNCTURE: CPT

## 2021-05-14 PROCEDURE — 74011000258 HC RX REV CODE- 258: Performed by: HOSPITALIST

## 2021-05-14 RX ORDER — CIPROFLOXACIN 500 MG/1
500 TABLET ORAL 2 TIMES DAILY
Qty: 20 TAB | Refills: 0 | Status: SHIPPED | OUTPATIENT
Start: 2021-05-14 | End: 2021-05-17

## 2021-05-14 RX ORDER — METRONIDAZOLE 500 MG/1
500 TABLET ORAL 3 TIMES DAILY
Qty: 30 TAB | Refills: 0 | Status: SHIPPED | OUTPATIENT
Start: 2021-05-14 | End: 2021-05-17

## 2021-05-14 RX ORDER — SAME BUTANEDISULFONATE/BETAINE 400-600 MG
500 POWDER IN PACKET (EA) ORAL 2 TIMES DAILY
Qty: 40 CAP | Refills: 0 | Status: SHIPPED | OUTPATIENT
Start: 2021-05-14 | End: 2021-05-24

## 2021-05-14 RX ADMIN — Medication 1 AMPULE: at 08:19

## 2021-05-14 RX ADMIN — Medication 1 AMPULE: at 21:39

## 2021-05-14 RX ADMIN — METRONIDAZOLE 500 MG: 500 INJECTION, SOLUTION INTRAVENOUS at 16:17

## 2021-05-14 RX ADMIN — AMITRIPTYLINE HYDROCHLORIDE 100 MG: 25 TABLET, FILM COATED ORAL at 21:41

## 2021-05-14 RX ADMIN — MESALAMINE 1000 MG: 1000 SUPPOSITORY RECTAL at 07:51

## 2021-05-14 RX ADMIN — CLONAZEPAM 6 MG: 1 TABLET ORAL at 21:39

## 2021-05-14 RX ADMIN — POTASSIUM BICARBONATE 40 MEQ: 391 TABLET, EFFERVESCENT ORAL at 08:51

## 2021-05-14 RX ADMIN — LEVOTHYROXINE SODIUM 125 MCG: 0.12 TABLET ORAL at 06:28

## 2021-05-14 RX ADMIN — MESALAMINE 1000 MG: 1000 SUPPOSITORY RECTAL at 21:00

## 2021-05-14 RX ADMIN — METRONIDAZOLE 500 MG: 500 INJECTION, SOLUTION INTRAVENOUS at 23:00

## 2021-05-14 RX ADMIN — Medication 10 ML: at 13:45

## 2021-05-14 RX ADMIN — ENOXAPARIN SODIUM 40 MG: 40 INJECTION SUBCUTANEOUS at 08:30

## 2021-05-14 RX ADMIN — Medication 10 ML: at 06:25

## 2021-05-14 RX ADMIN — FAMOTIDINE 20 MG: 10 INJECTION, SOLUTION INTRAVENOUS at 12:24

## 2021-05-14 RX ADMIN — OLANZAPINE 7.5 MG: 5 TABLET, FILM COATED ORAL at 21:39

## 2021-05-14 RX ADMIN — METRONIDAZOLE 500 MG: 500 INJECTION, SOLUTION INTRAVENOUS at 07:47

## 2021-05-14 RX ADMIN — Medication 10 ML: at 21:41

## 2021-05-14 RX ADMIN — CEFTRIAXONE 1 G: 1 INJECTION, POWDER, FOR SOLUTION INTRAMUSCULAR; INTRAVENOUS at 15:31

## 2021-05-14 NOTE — PROGRESS NOTES
[employer] [facility] On[facility]        Inpatient Hematology / Oncology Plan of Care    Reason for Consult:  Small bowel obstruction [facility]) [K70.835]  Referring Physician:  Judy Cohen MD    24 Hour Events:  Afebrile, VSS  S/p pelvic abscess drainage yesterday  UCx +GNR    ROS:  Constitutional: Negative for fever, chills. CV: Negative for chest pain, palpitations, edema. Respiratory: Negative for dyspnea, cough, wheezing. GI: Negative for nausea, abdominal pain, diarrhea. 10 point review of systems is otherwise negative with the exception of the elements mentioned above in the HPI.        No Known Allergies  Past Medical History:   Diagnosis Date    Acute deep vein thrombosis (DVT) of non-extremity vein 5/20/2019    Family history of malignant neoplasm of gastrointestinal tract     mother colon/ovarian cancer 67    Fecal incontinence     LAR syndrome    Former cigarette smoker     History of rectal cancer     Hypothyroid     stable w/med    Infection and inflammatory reaction due to other internal prosthetic devices, implants and grafts, subsequent encounter 2017    abd wound/mesh colostomy    Insomnia     takes meds    Major depression     Osteoarthritis, hand     Personal history of colonic polyps 9/2013    x 1    Personal history of malignant neoplasm of rectum, rectosigmoid junction, and anus 9/2013    Psychiatric disorder     anxiety     Past Surgical History:   Procedure Laterality Date    COLONOSCOPY N/A 2/12/2018    COLONOSCOPY / BMI=21 performed by Rosa Wei MD at [place] [address]. N/A 3/5/2021    COLONOSCOPY/BMI 19 performed by Sultana Crump MD at [address] [place] [address]  2/12/2018         COLONOSCOPY THRU STOMA,LESN RMVL W/SNARE  3/5/2021         ENDOSCOPY, COLON, DIAGNOSTIC  last 2/3/15    Emily--no polyps--3 year recall    HX COLOSTOMY  5/11/16    HX GI  4/2016    Sacral nerve stimlator lead trial (unsucessful) and removal    HX HERNIA REPAIR  3/2015, 2016    HX HERNIA REPAIR      and Colostomy in May    HX OTHER SURGICAL  10/7/2013    Emily---endorectal us    HX OTHER SURGICAL Bilateral     cataracts      HX POLYPECTOMY      HX TOTAL COLECTOMY  2013    Emily--lap LAR with coloproctostomy, mobilization splenic flexure, diverting loop ileostomy    HX TOTAL COLECTOMY Right 2014    for leak    HX VASCULAR ACCESS Left     single lumen port/subsequently removed    IR INSERT TUNL CVC W/O PORT OVER 5 YR  2019    IR REPLACE CVC TUNNELED W/O PORT  2019     Family History   Problem Relation Age of Onset    Cancer Mother         colon and ovarian    Cancer Brother         prostate    Alcohol abuse Brother     Cancer Maternal Grandmother         bone    Alcohol abuse Father     Alcohol abuse Sister     Stroke Paternal Grandfather      Social History     Socioeconomic History    Marital status: SINGLE     Spouse name: Not on file    Number of children: Not on file    Years of education: Not on file    Highest education level: Not on file   Occupational History    Not on file   Social Needs    Financial resource strain: Not on file    Food insecurity     Worry: Not on file     Inability: Not on file    Transportation needs     Medical: Not on file     Non-medical: Not on file   Tobacco Use    Smoking status: Former Smoker     Packs/day: 1.00     Years: 4.00     Pack years: 4.00     Types: Cigarettes     Quit date: 1963     Years since quittin.5    Smokeless tobacco: Never Used   Substance and Sexual Activity    Alcohol use: Not Currently     Alcohol/week: 11.7 standard drinks     Types: 14 Cans of beer per week     Comment: advised stop drinking given issues    Drug use: No    Sexual activity: Never   Lifestyle    Physical activity     Days per week: Not on file     Minutes per session: Not on file    Stress: Not on file   Relationships    Social connections     Talks on phone: Not on file     Gets together: Not on file     Attends Denominational service: Not on file     Active member of club or organization: Not on file     Attends meetings of clubs or organizations: Not on file     Relationship status: Not on file    Intimate partner violence     Fear of current or ex partner: Not on file     Emotionally abused: Not on file     Physically abused: Not on file     Forced sexual activity: Not on file   Other Topics Concern    Not on file   Social History Narrative    Not on file     Current Facility-Administered Medications   Medication Dose Route Frequency Provider Last Rate Last Admin    metroNIDAZOLE (FLAGYL) IVPB premix 500 mg  500 mg IntraVENous Q8H Prince Hector  mL/hr at 05/13/21 2352 500 mg at 05/13/21 2352    clonazePAM (KlonoPIN) tablet 6 mg  6 mg Oral QHS Alvaro Prado MD   6 mg at 05/13/21 2214    levothyroxine (SYNTHROID) tablet 125 mcg  125 mcg Oral ACB Brooks Prado MD   125 mcg at 05/14/21 2548    sodium chloride (NS) flush 5-40 mL  5-40 mL IntraVENous Q8H Brooks Prado MD   10 mL at 05/14/21 0625    sodium chloride (NS) flush 5-40 mL  5-40 mL IntraVENous PRN Brooks Prado MD        ondansetron (ZOFRAN) injection 4 mg  4 mg IntraVENous Q6H PRN Brooks Prado MD   4 mg at 05/12/21 0525    enoxaparin (LOVENOX) injection 40 mg  40 mg SubCUTAneous DAILY Brooks Prado MD   Stopped at 05/13/21 0900    morphine injection 0.5 mg  0.5 mg IntraVENous Q4H PRN Brooks Prado MD        acetaminophen (TYLENOL) suppository 650 mg  650 mg Rectal Q4H PRN Brooks Prado MD        cefTRIAXone (ROCEPHIN) 1 g in 0.9% sodium chloride (MBP/ADV) 50 mL MBP  1 g IntraVENous Q24H Alvaro Prado  mL/hr at 05/13/21 1636 1 g at 05/13/21 1636    famotidine (PF) (PEPCID) 20 mg in 0.9% sodium chloride 10 mL injection  20 mg IntraVENous Q24H Brooks Prado MD   Stopped at 05/13/21 1300    mesalamine (CANASA) suppository 1,000 mg  1 Suppository Rectal Q12H Brooks Prado MD   1,000 mg at 05/13/21 2100    alcohol 62% (NOZIN) nasal  1 Ampule  1 Ampule Topical Q12H Eldon Prado MD   1 Ampule at 214    amitriptyline (ELAVIL) tablet 100 mg  100 mg Oral QHS Alvaro Prado MD   100 mg at 21 2214    OLANZapine (ZyPREXA) tablet 7.5 mg  7.5 mg Oral QHS Alvaro Prado MD   7.5 mg at 21 2214     Facility-Administered Medications Ordered in Other Encounters   Medication Dose Route Frequency Provider Last Rate Last Admin    0.9% sodium chloride infusion  25 mL/hr IntraVENous CONTINUOUS Ortiz Snow MD 25 mL/hr at 21 1257 25 mL/hr at 21 1257    fentaNYL citrate (PF) injection 12.5-100 mcg  12.5-100 mcg IntraVENous Multiple Ortiz Snow MD   50 mcg at 21 1300    midazolam (VERSED) injection 0.25-2 mg  0.25-2 mg IntraVENous Multiple Ortiz Snow MD   1 mg at 21 1300       OBJECTIVE:  Patient Vitals for the past 8 hrs:   BP Temp Pulse Resp SpO2   21 0216 123/66 97.9 °F (36.6 °C) 68 18 96 %     Temp (24hrs), Av.7 °F (36.5 °C), Min:97.4 °F (36.3 °C), Max:98 °F (36.7 °C)    No intake/output data recorded. Physical Exam:  Constitutional: Well developed, well nourished male in no acute distress, sitting comfortably in the hospital bed. HEENT: Normocephalic and atraumatic. Oropharynx is clear, mucous membranes are moist.  Extraocular muscles are intact. Sclerae anicteric. Neck supple without JVD. No thyromegaly present. Skin Warm and dry. No bruising and no rash noted. No erythema. No pallor. Respiratory Lungs are clear to auscultation bilaterally without wheezes, rales or rhonchi, normal air exchange without accessory muscle use. CVS Normal rate, regular rhythm and normal S1 and S2. No murmurs, gallops, or rubs. Abdomen Soft, nontender and nondistended, normoactive bowel sounds. No palpable mass. No hepatosplenomegaly. Colostomy. Neuro Grossly nonfocal with no obvious sensory or motor deficits.    MSK Normal range of motion in general.  No edema and no tenderness. Psych Appropriate mood and affect. Labs:    Recent Results (from the past 24 hour(s))   CBC WITH AUTOMATED DIFF    Collection Time: 05/14/21  5:31 AM   Result Value Ref Range    WBC 4.3 4.3 - 11.1 K/uL    RBC 3.57 (L) 4.23 - 5.6 M/uL    HGB 9.7 (L) 13.6 - 17.2 g/dL    HCT 31.7 (L) 41.1 - 50.3 %    MCV 88.8 79.6 - 97.8 FL    MCH 27.2 26.1 - 32.9 PG    MCHC 30.6 (L) 31.4 - 35.0 g/dL    RDW 13.3 11.9 - 14.6 %    PLATELET 370 053 - 186 K/uL    MPV 8.8 (L) 9.4 - 12.3 FL    ABSOLUTE NRBC 0.00 0.0 - 0.2 K/uL    DF AUTOMATED      NEUTROPHILS 81 (H) 43 - 78 %    LYMPHOCYTES 6 (L) 13 - 44 %    MONOCYTES 11 4.0 - 12.0 %    EOSINOPHILS 1 0.5 - 7.8 %    BASOPHILS 1 0.0 - 2.0 %    IMMATURE GRANULOCYTES 0 0.0 - 5.0 %    ABS. NEUTROPHILS 3.5 1.7 - 8.2 K/UL    ABS. LYMPHOCYTES 0.3 (L) 0.5 - 4.6 K/UL    ABS. MONOCYTES 0.5 0.1 - 1.3 K/UL    ABS. EOSINOPHILS 0.1 0.0 - 0.8 K/UL    ABS. BASOPHILS 0.0 0.0 - 0.2 K/UL    ABS. IMM. GRANS. 0.0 0.0 - 0.5 K/UL   METABOLIC PANEL, COMPREHENSIVE    Collection Time: 05/14/21  5:31 AM   Result Value Ref Range    Sodium 140 136 - 145 mmol/L    Potassium 3.5 3.5 - 5.1 mmol/L    Chloride 106 98 - 107 mmol/L    CO2 29 21 - 32 mmol/L    Anion gap 5 (L) 7 - 16 mmol/L    Glucose 78 65 - 100 mg/dL    BUN 24 (H) 8 - 23 MG/DL    Creatinine 0.91 0.8 - 1.5 MG/DL    GFR est AA >60 >60 ml/min/1.73m2    GFR est non-AA >60 >60 ml/min/1.73m2    Calcium 8.5 8.3 - 10.4 MG/DL    Bilirubin, total 0.2 0.2 - 1.1 MG/DL    ALT (SGPT) 26 12 - 65 U/L    AST (SGOT) 23 15 - 37 U/L    Alk.  phosphatase 72 50 - 136 U/L    Protein, total 5.9 (L) 6.3 - 8.2 g/dL    Albumin 2.6 (L) 3.2 - 4.6 g/dL    Globulin 3.3 2.3 - 3.5 g/dL    A-G Ratio 0.8 (L) 1.2 - 3.5         Imaging:  CT ABD PELV W CONT [[id number]] Collected: 05/11/21 1010   Order Status: Completed Updated: 05/11/21 1017   Narrative:     CT ABDOMEN AND PELVIS WITH CONTRAST     HISTORY: Restaging rectal carcinoma     COMPARISON: 8/31/2020     TECHNIQUE: Helical imaging was performed from the lung bases through the ischial   tuberosities during the intravenous infusion of 100 cc of Isovue-370. Oral   contrast was administered. Coronal and sagittal reformats were performed. Dose reduction techniques used: Automated exposure control, adjustment of the   mAs and/or kVp according to patient's size, and iterative reconstruction   techniques. FINDINGS:   *  LUNG BASES: Coronary artery disease. *  LIVER: Within normal limits. Mirta Raddle AND BILE DUCTS: Normal. Mild intrahepatic biliary ductal   dilatation. *  SPLEEN: Within normal limits. Gerold Chin TRACT: Left renal atrophy. Bilateral hydronephrosis of uncertain   etiology. *  ADRENALS: Within normal limits. *  PANCREAS: Dilatation of the pancreatic duct. *  GASTROINTESTINAL TRACT: Low anterior colon resection. New enhancing mass with   necrotic center to the right of the rectum in the presacral space measuring 3.4   x 2.0 cm. Small bowel obstruction containing fecal lies material.   *  RETROPERITONEUM: Within normal limits. *  PERITONEAL CAVITY AND ABDOMINAL WALL: Within normal limits. *  PELVIS: Within normal limits. *  SPINE / BONES: Within normal limits. *  OTHER COMMENTS: None. Impression:       Findings worrisome for right presacral tumor recurrence. Consider obtaining a   PET/CT scan. Small bowel obstruction containing fecal-like material. This is worse when   compared to 8/31/2020. Bilateral hydronephrosis of uncertain etiology. Was not present on the prior   study. Dilatation of the intrahepatic biliary ducts and pancreatic duct. This is more   prominent on the study secondary to the use of intravenous contrast. Correlation   with LFTs is recommended.           ASSESSMENT:  Problem List  Date Reviewed: 12/10/2020          Codes Class Noted    Pelvic abscess in male [facility]) ICD-10-CM: K65.1  ICD-9-CM: 567.22  5/13/2021        Anxiety ICD-10-CM: F41.9  ICD-9-CM: 300.00  5/11/2021        Depression ICD-10-CM: F32.9  ICD-9-CM: 641  5/11/2021        UTI (urinary tract infection) ICD-10-CM: N39.0  ICD-9-CM: 599.0  5/11/2021        Bilateral hydronephrosis ICD-10-CM: N13.30  ICD-9-CM: 906  5/11/2021        Dilated intrahepatic bile duct ICD-10-CM: K83.8  ICD-9-CM: 576.8  5/11/2021        Pancreatic duct dilated ICD-10-CM: K86.89  ICD-9-CM: 577.8  5/11/2021        Major depression ICD-10-CM: F32.9  ICD-9-CM: 296.20  Unknown        Hypothyroid ICD-10-CM: E03.9  ICD-9-CM: 244.9  6/14/2019    Overview Signed 6/14/2019 12:12 AM by Collins Zavala MD     stable w/med             Fever ICD-10-CM: R50.9  ICD-9-CM: 780.60  6/13/2019        Acute UTI (urinary tract infection) ICD-10-CM: N39.0  ICD-9-CM: 599.0  6/13/2019        Anemia ICD-10-CM: D64.9  ICD-9-CM: 285.9  6/13/2019        Elevated LFTs ICD-10-CM: R79.89  ICD-9-CM: 790.6  6/13/2019        Acute blood loss anemia ICD-10-CM: D62  ICD-9-CM: 285.1  5/21/2019        Acute deep vein thrombosis (DVT) of non-extremity vein ICD-10-CM: I82.90  ICD-9-CM: 453.9  5/20/2019        DVT (deep vein thrombosis) in pregnancy ICD-10-CM: O22.30  ICD-9-CM: 671.30  5/20/2019        Malnutrition ([facility] ICD-10-CM: E46  ICD-9-CM: 263.9  5/19/2019        On total parenteral nutrition (TPN) ICD-10-CM: Z78.9  ICD-9-CM: V49.89  5/19/2019        Colostomy care ([facility] ICD-10-CM: Z43.3  ICD-9-CM: V55.3  5/19/2019        Chronic indwelling Bishop catheter ICD-10-CM: Z97.8  ICD-9-CM: V45.89  5/19/2019        Moderate protein-calorie malnutrition ([facility] ICD-10-CM: E44.0  ICD-9-CM: 263.0  5/19/2019        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  ICD-9-CM: 569.81  4/29/2019        * (Principal) Small bowel obstruction ([facility] ICD-10-CM: Y03.729  ICD-9-CM: 560.9  3/30/2019        SBO (small bowel obstruction) ([facility] ICD-10-CM: C75.508  ICD-9-CM: 560.9  3/22/2019        Infection of implant ICD-10-CM: T85.79XA  ICD-9-CM: 996.60  3/30/2017 Abdominal wall abscess ICD-10-CM: L02.211  ICD-9-CM: 682.2  5/22/2016        Infected prosthetic mesh of abdominal wall (HCC) ICD-10-CM: T85.79XA  ICD-9-CM: 996.69  5/22/2016        Abscess ICD-10-CM: L02.91  ICD-9-CM: 682.9  5/22/2016        Fecal incontinence ICD-10-CM: R15.9  ICD-9-CM: 787.60  5/11/2016        Bowel incontinence ICD-10-CM: R15.9  ICD-9-CM: 787.60  5/11/2016        Weight loss ICD-10-CM: R63.4  ICD-9-CM: 783.21 Acute 11/19/2015        Hypercalcemia of malignancy ICD-10-CM: E83.52  ICD-9-CM: 275.42  11/19/2015        Hypokalemia ICD-10-CM: E87.6  ICD-9-CM: 276.8  8/29/2014        Hypomagnesemia ICD-10-CM: E83.42  ICD-9-CM: 275.2  8/29/2014        Pleural effusion ICD-10-CM: J90  ICD-9-CM: 511.9  8/29/2014        Hypernatremia ICD-10-CM: E87.0  ICD-9-CM: 276.0  8/29/2014        Severe sepsis with acute organ dysfunction ([facility] ICD-10-CM: A41.9, R65.20  ICD-9-CM: 038.9, 995.92  8/24/2014        Hypoxemia ICD-10-CM: R09.02  ICD-9-CM: 799.02  8/24/2014        Peritonitis (acute) generalized ICD-10-CM: K65.0  ICD-9-CM: 567.21  8/24/2014        Attention to ileostomy [facility]) ICD-10-CM: Z43.2  ICD-9-CM: V55.2  8/20/2014        Dehydration ICD-10-CM: E86.0  ICD-9-CM: 276.51  4/17/2014        Ileus ([facility] ICD-10-CM: K56.7  ICD-9-CM: 560.1  3/20/2014        Postoperative ileus ([facility] ICD-10-CM: K91.89, K56.7  ICD-9-CM: 997.49, 560.1  2/7/2014        Constipation ICD-10-CM: K59.00  ICD-9-CM: 564.00  10/29/2013        Rectal cancer ([facility] ICD-10-CM: C20  ICD-9-CM: 154.1  10/22/2013            Mr. Stefania Lizarraga is a 66 y.o. male admitted on 5/11/2021. The primary encounter diagnosis was Intestinal obstruction, unspecified cause, unspecified whether partial or complete ([address] Diagnoses of Small bowel obstruction ([address] Hydroureter, right, History of rectal cancer, Pelvic mass, Pancreatic duct dilated, Rectal carcinoma ([address] and Abscess were also pertinent to this visit. Conner Reas       His PMH includes hypothyroidism, anxiety/depression, colostomy, proctitis and multiple abd surgeries. He is a known patient of Dr. Maico Hermosillo with Stage III rectal cancer s/p neoadjuvant chemo followed by LAR and adjuvant 5FU/leucovorin from 4/2014, completed 8 cycles. He presented with c/o no output from colostomy for 24 hrs, mild nausea, episode of vomiting, and cramping abd discomfort. CT AP with findings worrisome for R presacral tumor recurrence; SBO; b/l hydronephrosis; and dilatation of the intrahepatic biliary ducts and pancreatic duct. CEA 1.6, however pt has never had elevated CEA. Surgery following, on conservative measures for SBO. Pt now having colostomy output. On CTX for UTI. UCx pending. Urology consulted for b/l hydronephrosis. Kidney fx wnl. Liver fx wnl. We were consulted d/t the concern for disease recurrence. RECOMMENDATIONS:  Stage III rectal cancer  - s/p neoadjuvant chemo followed by LAR and adjuvant 5FU/leucovorin from 4/2014, completed 8 cycles  - CT AP with findings worrisome for R presacral tumor recurrence  - CEA 1.6, however pt has never had elevated CEA  - Consult IR for biopsy  5/14 The possible presacral mass appears to be more c/w abscess - drained yesterday, Cx pending. SBO  - Surgery following  - conservative measures  - appears to be resolving, now having output    B/l hydronephrosis  - Urology consulted  - Cr wnl    UTI  - UCx pending  - On CTX  5/14 UCx +GNR, I/S pending. On CTX/Flagyl. Dilatation of intrahepatic biliary ducts / pancreatic duct  - no bili/LFT elevation    Thank you for allowing us to participate in the care of Mr. Marroquin. We will sign off; please do not hesitate to call with any questions. No need for f/u at this time per Dr. Maico Hermosillo.          Suleiman Luther, MAZIN   [facility]ematology [facility] On[facility]  [address] [place] [address]  Office : [phone]  Fax : [phone]   I personally saw, exammed and counselled the patient, and discussed with NP, agree with above history/assessment/plan. 66 y. o.male history of stage III rectal cancer admitted for small bowel obstruction and CT concern for presacral cystic mass, discussed the concern of cancer recurrence versus abscess, discussed the further imaging with MRI or PET however unlikely to definitively clarify the nature of the finding, consulted IR for biopsy and aspiration, showed abscess, drain is placed, discussed with pt to continue antibiotics, please call if needed. Fany Fuller M.D.   [address]  Office : [phone]  Fax : [phone]

## 2021-05-14 NOTE — PROGRESS NOTES
Attempted x 2 to evaluate pt, but [place]. Will follow results. No acute surgical issues.

## 2021-05-14 NOTE — PROGRESS NOTES
END OF SHIFT NOTE:    Intake/Output  05/14 0701 - 05/14 1900  In: 832.9 [P.O.:720; I.V.:112.9]  Out: 200    Voiding: YES  Catheter: NO  Drain:   Drain 8 Fr presacral abscess drain 05/13/21 Posterior;Right Hip (Active)   Site Assessment Clean, dry, & intact 05/14/21 1422   Dressing Status Clean, dry, & intact 05/14/21 1422   Status Charged 05/14/21 1422   Drainage Description Serosanguinous 05/14/21 1422       Stool:  1 occurrences. Stool Assessment  Stool Appearance: Loose; Soft (05/14/21 0724)    Emesis:  0 occurrences. VITAL SIGNS  Patient Vitals for the past 12 hrs:   Temp Pulse Resp BP SpO2   05/14/21 1447  68 18 (!) 140/76 100 %   05/14/21 1103 98 °F (36.7 °C) (!) 57 16 (!) 160/95 100 %   05/14/21 0741 97.2 °F (36.2 °C) (!) 104 20 116/83 97 %       Pain Assessment  Pain 1  Pain Scale 1: Numeric (0 - 10) (05/14/21 1150)  Pain Intensity 1: 0 (05/14/21 1024)  Patient Stated Pain Goal: 0 (05/14/21 0235)    Ambulating  Yes    Additional Information:     Pt ambulates on his own, he is weak but able to walk full [place], I walked with him. Pt takes care of his own colostomy bag draining, I offered to assist, he refused. Gave pt IV antibiotics, Pt decided to stay one more night, physician was fine with that. Pt is resting with no questions or concerns at this time. Shift report given to oncoming nurse Rossana Adrian RN at the bedside.     Josse Ramirez

## 2021-05-14 NOTE — PROGRESS NOTES
Problem: Mobility Impaired (Adult and Pediatric)  Goal: *Acute Goals and Plan of Care (Insert Text)  Note: STG:  (1.)Mr. Marroquin will move from supine to sit and sit to supine  with SUPERVISION within 7 treatment day(s). (2.)Mr. Marroquin will transfer from bed to chair and chair to bed with SUPERVISION using the least restrictive device within 7 treatment day(s). (3.)Mr. Marroquin will ambulate with SUPERVISION for 650 feet with the least restrictive device within 7 treatment day(s). (4.)Pt. will climb up/down 13 steps with 1 rail and CGA within 7 days  (5.)Pt. will increase B LE strength 1/2 grade within 7 days    ________________________________________________________________________________________________      PHYSICAL THERAPY: Initial Assessment and AM 5/14/2021  INPATIENT: PT Visit Days : 1  [place] / [place] B / Product Type: Medicare /       NAME/AGE/GENDER: Tommy Foster is a 66 y.o. male   PRIMARY DIAGNOSIS: Small bowel obstruction ([facility] [address] [K56.609] Small bowel obstruction ([facility] [address] Small bowel obstruction ([facility] [address]        ICD-10: Treatment Diagnosis:    Generalized Muscle Weakness (M62.81)  Other lack of cordination (R27.8)  Other abnormalities of gait and mobility (R26.89)   Precaution/Allergies:  Patient has no known allergies. ASSESSMENT:     Mr. Jamila Girard presents with a SBO and is experiencing a decline in his premorbid level of function. He would benefit from further PT while here to address these deficits: decreased general strength and endure, functional mobility and standing balance. He plans [place] returning home at [place] and we will assess closer to [place] to see to see if he would benefit from [facility] PT. This am, he requires CGA for toilet transfer, but is able to handle his yony care on his own. Since he has rectal issues, he has a specific routine he follows. He then ambulated 650' with SBA. He has a very slow and shuffling gait pattern.  Once he returned to his room, he wanted to get in his bed. I encouraged him to perform AROM exs with B LEs several times a day. He asked whether he could amb on unit himself. I told him I was ok with that as long as he told the RN, he was going to walk and that he staying on the 3rd floor. This section established at most recent assessment   PROBLEM LIST (Impairments causing functional limitations):  Decreased Strength  Decreased ADL/Functional Activities  Decreased Transfer Abilities  Decreased Ambulation Ability/Technique  Decreased Balance  Increased Pain  Decreased Activity Tolerance  Increased Fatigue  Decreased Flexibility/Joint Mobility  Decreased Skin Integrity/Hygeine  Decreased Peach with Home Exercise Program   INTERVENTIONS PLANNED: (Benefits and precautions of physical therapy have been discussed with the patient.)  Balance Exercise  Bed Mobility  Gait Training  Home Exercise Program (HEP)  Range of Motion (ROM)  Therapeutic Activites  Therapeutic Exercise/Strengthening  Transfer Training  Endurance activities     TREATMENT PLAN: Frequency/Duration: daily for duration of hospital stay  Rehabilitation Potential For Stated Goals: Good     REHAB RECOMMENDATIONS (at time of discharge pending progress):    Placement: It is my opinion, based on this patient's performance to date, that Mr. Marroquin may benefit from participating in 1-2 additional therapy sessions in order to continue to assess for rehab potential and then make recommendation for disposition at discharge. I am predicting home but will decide whether [facility] PT or OP is indicated  Equipment:   Will assess              HISTORY:   History of Present Injury/Illness (Reason for Referral): Admitted with SBO.  Has hx of rectal CA  Past Medical History/Comorbidities:   Mr. Libby Ortiz  has a past medical history of Acute deep vein thrombosis (DVT) of non-extremity vein (5/20/2019), Family history of malignant neoplasm of gastrointestinal tract, Fecal incontinence, Former cigarette smoker, History of rectal cancer, Hypothyroid, Infection and inflammatory reaction due to other internal prosthetic devices, implants and grafts, subsequent encounter (2017), Insomnia, Major depression, Osteoarthritis, hand, Personal history of colonic polyps (9/2013), Personal history of malignant neoplasm of rectum, rectosigmoid junction, and anus (9/2013), and Psychiatric disorder. He also has no past medical history of Adverse effect of anesthesia, Aneurysm ([address] Arrhythmia, Asthma, Autoimmune disease ([address] CAD (coronary artery disease), Chronic kidney disease, Chronic obstructive pulmonary disease ([address] Chronic pain, Coagulation disorder ([address] Diabetes ([address] Difficult intubation, Endocarditis, GERD (gastroesophageal reflux disease), Heart failure ([address] Hypertension, Ill-defined condition, Liver disease, Malignant hyperthermia due to anesthesia, Morbid obesity ([address] Nausea & vomiting, Other ill-defined conditions(799.89), Pseudocholinesterase deficiency, PUD (peptic ulcer disease), Rheumatic fever, Seizures ([address] Stroke ([address] Unspecified adverse effect of anesthesia, or Unspecified sleep apnea. Mr. Brandy Sears  has a past surgical history that includes endoscopy, colon, diagnostic (last 2/3/15); hx polypectomy; hx other surgical (10/7/2013); hx total colectomy (11/2013); hx total colectomy (Right, 8/2014); hx hernia repair (3/2015, 5/2016); hx gi (4/2016); hx colostomy (5/11/16); hx hernia repair; hx other surgical (Bilateral); colonoscopy thru stoma (2/12/2018); colonoscopy (N/A, 2/12/2018); hx vascular access (Left, 4/14); ir insert tunl cvc w/o port over 5 yr (5/20/2019); ir replace cvc tunneled w/o port (5/30/2019); colonoscopy thru stoma,lesn rmvl w/snare (3/5/2021); and colonoscopy (N/A, 3/5/2021).   Social History/Living Environment:   Home Environment: Private residence  # Steps to Enter: 1  Rails to Enter: No  One/Two Story Residence: Two story  # of Interior Steps: 13  Height of Each Step (in): 8 inches  Interior Rails: Left  Lift Chair Available: No  Living Alone: Yes  Support Systems: Friends \ neighbors  Patient Expects to be Discharged to[de-identified] Private residence  Current DME Used/Available at Home: None  Tub or Shower Type: Tub/Shower combination  Prior Level of Function/Work/Activity:  Functionally I prior to admission  Dominant Side:         RIGHT   Number of Personal Factors/Comorbidities that affect the Plan of Care: 3+: HIGH COMPLEXITY   EXAMINATION:   Most Recent Physical Functioning:   Gross Assessment:  AROM: Generally decreased, functional(B LEs)  Strength: Generally decreased, functional(grossly 3+/5 B LEs)  Coordination: Generally decreased, functional(B LEs)  Sensation: Intact(B LEs)               Posture:  Posture Assessment: Forward head, Rounded shoulders  Balance:  Sitting: Intact  Standing: (high guard) Bed Mobility:  Rolling: Supervision  Supine to Sit: Stand-by assistance  Sit to Supine: Stand-by assistance  Scooting: Stand-by assistance  Wheelchair Mobility:     Transfers:  Sit to Stand: Stand-by assistance  Stand to Sit: Stand-by assistance  Stand Pivot Transfers: Stand-by assistance  Bed to Chair: Stand-by assistance  Toilet Transfer : Stand-by assistance  Duration: 20 Minutes  Gait:     Speed/Alicia: Slow;Shuffled  Gait Abnormalities: Shuffling gait  Distance (ft): 650 Feet (ft)  Ambulation - Level of Assistance: Stand-by assistance  Interventions: Safety awareness training;Verbal cues      Body Structures Involved:  Digestive Structures  Muscles Body Functions Affected:   Movement Related  Skin Related  Digestive Activities and Participation Affected:  General Tasks and Demands  Mobility  Self Care   Number of elements that affect the Plan of Care: 4+: HIGH COMPLEXITY   CLINICAL PRESENTATION:   Presentation: Stable and uncomplicated: LOW COMPLEXITY   CLINICAL DECISION MAKIN [facility] [address] [id number] AM-PAC 6 Clicks   Basic Mobility Inpatient Short Form  How much difficulty does the patient currently have. .. Unable A Lot A Little None   1. Turning over in bed (including adjusting bedclothes, sheets and blankets)? [] 1   [] 2   [] 3   [x] 4   2. Sitting down on and standing up from a chair with arms ( e.g., wheelchair, bedside commode, etc.)   [] 1   [] 2   [x] 3   [] 4   3. Moving from lying on back to sitting on the side of the bed? [] 1   [] 2   [x] 3   [] 4   How much help from another person does the patient currently need. .. Total A Lot A Little None   4. Moving to and from a bed to a chair (including a wheelchair)? [] 1   [] 2   [x] 3   [] 4   5. Need to walk in hospital room? [] 1   [] 2   [x] 3   [] 4   6. Climbing 3-5 steps with a railing? [] 1   [] 2   [x] 3   [] 4   © 2007, Trustees of [facility] [place] [employer], under license to [employer]. All rights reserved      Score:  Initial:19 Most Recent: X (Date: -- )    Interpretation of Tool:  Represents activities that are increasingly more difficult (i.e. Bed mobility, Transfers, Gait). Medical Necessity:     Patient demonstrates   good   rehab potential due to higher previous functional level. Reason for Services/Other Comments:  Patient   continues to require present interventions due to patient's inability to perform functional mobility at a safe supervision level  . Use of outcome tool(s) and clinical judgement create a POC that gives a: Clear prediction of patient's progress: LOW COMPLEXITY            TREATMENT:   (In addition to Assessment/Re-Assessment sessions the following treatments were rendered)   Pre-treatment Symptoms/Complaints:  weak  Pain: Initial:   Pain Intensity 1: 0  Post Session:  0, returned to bed     Therapeutic Activity: (  20 Minutes ):  Therapeutic activities including Bed transfers, Chair transfers, Toilet transfers, Ambulation on level ground, and instruction in AROM exs with B LEs throughout the day/evening to improve mobility, strength, balance, and coordination.   Required minimal Safety awareness training;Verbal cues to  insure safety . Assessment provided today    Braces/Orthotics/Lines/Etc:   drain DIANE  O2 Device: None (Room air)  Treatment/Session Assessment:    Response to Treatment:  tolerated well. I spoke with his RN,Erasmo and told him I felt he could ambulate on floor by himself. Interdisciplinary Collaboration:   Physical Therapist  Registered Nurse  After treatment position/precautions:   Supine in bed  Bed/Chair-wheels locked  Bed in low position  Call light within reach  RN notified  Side rails x 3   Compliance with Program/Exercises: Will assess as treatment progresses  Recommendations/Intent for next treatment session: \"Next visit will focus on advancements to more challenging activities and reduction in assistance provided\".   Total Treatment Duration:  PT Patient Time In/Time Out  Time In: 0945  Time Out: [address], PT

## 2021-05-14 NOTE — PROGRESS NOTES
Urology Update Note:     Patient voiding on his own. Cr normal.  Urine culture prelim with GNR. He is on antibiotics. Recommend no intervention for hydronephrosis for now given that he is clinically stable and is not septic. Etiology of hydronephrosis is uncertain at this point. Will arrange outpatient follow up in 3-4 weeks with a renal ultrasound prior to appointment to ensure resolution of the hydronephrosis. My office will call patient to schedule. Jack Anguiano M.D.     [facility]  [address]  Phone: [phone]  Fax: [phone]

## 2021-05-14 NOTE — PROGRESS NOTES
Surgery Consult      Patient: Luther Marroquin 66 y.o. male     Consulting Physician:  ER/hospitalist    Chief Complaint: SBO    Subjective:      Venus Nur is a 66 y.o. male who surgery has been asked to see for evaluation of a recurrent bowel obstruction. He is well known to surgery, having an extremely complicated course since diagnosis of rectal cancer in 2013 with neoadjuvant chemoradiation, with complete response, followed by definitive surgery and completion chemotherapy. He has subsequently had numerous surgical complications after stoma closure to include anastomotic leak(s), wound infections, hernia and mesh infection, and fistula formation. He has been successfully treated for SBO on several occasions. He had followed with Dr Bobie Schwab but has not been seen since 2019. He presented today to the ER due to abdominal pain yesterday with no stoma output for 24 hours. He has not had n/v, fever or chills. He does feel a little bloated. He has actually begun to have watery stoma output since arrival to ER.    5/12/21  Pt reports some nausea overnight. He has emptied his bag and now it is full of liquid stool output again. He reports abdominal cramping but is not in pain. AF, VSS, on room air. WBC 8.     5/13/21 Pt denies abdominal pain, N/V. Large amount of stool output from ostomy. Transferring downtown today for IR biopsy. 5/14/21Pt denies abdominal pain, N/V.  +flatus and stool output from ostomy. S/p IR drain placement into pelvic abscess. Cultures with GNR so far. Afebrile, WBC 4.       Past Medical History:   Diagnosis Date    Acute deep vein thrombosis (DVT) of non-extremity vein 5/20/2019    Family history of malignant neoplasm of gastrointestinal tract     mother colon/ovarian cancer 67    Fecal incontinence     LAR syndrome    Former cigarette smoker     History of rectal cancer     Hypothyroid     stable w/med    Infection and inflammatory reaction due to other internal prosthetic devices, implants and grafts, subsequent encounter 2017    abd wound/mesh colostomy    Insomnia     takes meds    Major depression     Osteoarthritis, hand     Personal history of colonic polyps 9/2013    x 1    Personal history of malignant neoplasm of rectum, rectosigmoid junction, and anus 9/2013    Psychiatric disorder     anxiety       Past Surgical History:   Procedure Laterality Date    COLONOSCOPY N/A 2/12/2018    COLONOSCOPY / BMI=21 performed by Odell Wright MD at [address] N/A 3/5/2021    COLONOSCOPY/BMI 19 performed by John Paul Chicas MD at [address] [place] [address]  2/12/2018         COLONOSCOPY THRU STOMA,LESN RMVL W/SNARE  3/5/2021         ENDOSCOPY, COLON, DIAGNOSTIC  last 2/3/15    Emily--no polyps--3 year recall    HX COLOSTOMY  5/11/16    HX GI  4/2016    Sacral nerve stimlator lead trial (unsucessful) and removal    HX HERNIA REPAIR  3/2015, 5/2016    HX HERNIA REPAIR      and Colostomy in May    HX OTHER SURGICAL  10/7/2013    Emily---endorectal us    HX OTHER SURGICAL Bilateral     cataracts  2017    HX POLYPECTOMY      HX TOTAL COLECTOMY  11/2013    Emily--lap LAR with coloproctostomy, mobilization splenic flexure, diverting loop ileostomy    HX TOTAL COLECTOMY Right 8/2014    for leak    HX VASCULAR ACCESS Left 4/14    single lumen port/subsequently removed    IR INSERT TUNL CVC W/O PORT OVER 5 YR  5/20/2019    IR REPLACE CVC TUNNELED W/O PORT  5/30/2019        Family History   Problem Relation Age of Onset    Cancer Mother         colon and ovarian    Cancer Brother         prostate    Alcohol abuse Brother     Cancer Maternal Grandmother         bone    Alcohol abuse Father     Alcohol abuse Sister     Stroke Paternal Grandfather        Social History     Socioeconomic History    Marital status: SINGLE     Spouse name: Not on file    Number of children: Not on file    Years of education: Not on file    Highest education level: Not on file   Tobacco Use    Smoking status: Former Smoker     Packs/day: 1.00     Years: 4.00     Pack years: 4.00     Types: Cigarettes     Quit date: 1963     Years since quittin.5    Smokeless tobacco: Never Used   Substance and Sexual Activity    Alcohol use: Not Currently     Alcohol/week: 11.7 standard drinks     Types: 14 Cans of beer per week     Comment: advised stop drinking given issues    Drug use: No    Sexual activity: Never        Current Facility-Administered Medications   Medication Dose Route Frequency    potassium bicarb-citric acid (EFFER-K) tablet 40 mEq  40 mEq Oral NOW    metroNIDAZOLE (FLAGYL) IVPB premix 500 mg  500 mg IntraVENous Q8H    clonazePAM (KlonoPIN) tablet 6 mg  6 mg Oral QHS    levothyroxine (SYNTHROID) tablet 125 mcg  125 mcg Oral ACB    sodium chloride (NS) flush 5-40 mL  5-40 mL IntraVENous Q8H    sodium chloride (NS) flush 5-40 mL  5-40 mL IntraVENous PRN    ondansetron (ZOFRAN) injection 4 mg  4 mg IntraVENous Q6H PRN    enoxaparin (LOVENOX) injection 40 mg  40 mg SubCUTAneous DAILY    morphine injection 0.5 mg  0.5 mg IntraVENous Q4H PRN    acetaminophen (TYLENOL) suppository 650 mg  650 mg Rectal Q4H PRN    cefTRIAXone (ROCEPHIN) 1 g in 0.9% sodium chloride (MBP/ADV) 50 mL MBP  1 g IntraVENous Q24H    famotidine (PF) (PEPCID) 20 mg in 0.9% sodium chloride 10 mL injection  20 mg IntraVENous Q24H    mesalamine (CANASA) suppository 1,000 mg  1 Suppository Rectal Q12H    alcohol 62% (NOZIN) nasal  1 Ampule  1 Ampule Topical Q12H    amitriptyline (ELAVIL) tablet 100 mg  100 mg Oral QHS    OLANZapine (ZyPREXA) tablet 7.5 mg  7.5 mg Oral QHS     Facility-Administered Medications Ordered in Other Encounters   Medication Dose Route Frequency    0.9% sodium chloride infusion  25 mL/hr IntraVENous CONTINUOUS    fentaNYL citrate (PF) injection 12.5-100 mcg  12.5-100 mcg IntraVENous Multiple    midazolam (VERSED) injection 0.25-2 mg  0.25-2 mg IntraVENous Multiple        No Known Allergies    Review of Systems:  Pertinent items are noted in the History of Present Illness. Has ongoing rectal discharge- using suppository from GI with good result  No cp, sob, wt loss. Objective:        Patient Vitals for the past 8 hrs:   BP Temp Pulse Resp SpO2   21 0741 116/83 97.2 °F (36.2 °C) (!) 104 20 97 %   21 0216 123/66 97.9 °F (36.6 °C) 68 18 96 %       Temp (24hrs), Av.7 °F (36.5 °C), Min:97.2 °F (36.2 °C), Max:98 °F (36.7 °C)      Physical Exam:  GENERAL: alert, cooperative, no distress, appears stated age, EYE: negative findings: anicteric sclera, THROAT & NECK: normal and mucous membranes dry, LUNG: clear to auscultation bilaterally, HEART: regular rate and rhythm, ABDOMEN: soft, non distended, nontender. Palpable suture under skin/scar in midline. Stoma pink, large amount of flatus in bag; +BS. IR drain intact with dark brown output. EXTREMITIES:  extremities normal, atraumatic, no cyanosis or edema, SKIN: no rash or abnormalities, NEUROLOGIC: negative findings: alert, oriented x3  speech normal in context and clarity  memory intact grossly  cranial nerves II-XII intact, PSYCHIATRIC: non focal      Labs:   Recent Results (from the past 24 hour(s))   CBC WITH AUTOMATED DIFF    Collection Time: 21  5:31 AM   Result Value Ref Range    WBC 4.3 4.3 - 11.1 K/uL    RBC 3.57 (L) 4.23 - 5.6 M/uL    HGB 9.7 (L) 13.6 - 17.2 g/dL    HCT 31.7 (L) 41.1 - 50.3 %    MCV 88.8 79.6 - 97.8 FL    MCH 27.2 26.1 - 32.9 PG    MCHC 30.6 (L) 31.4 - 35.0 g/dL    RDW 13.3 11.9 - 14.6 %    PLATELET 599 676 - 354 K/uL    MPV 8.8 (L) 9.4 - 12.3 FL    ABSOLUTE NRBC 0.00 0.0 - 0.2 K/uL    DF AUTOMATED      NEUTROPHILS 81 (H) 43 - 78 %    LYMPHOCYTES 6 (L) 13 - 44 %    MONOCYTES 11 4.0 - 12.0 %    EOSINOPHILS 1 0.5 - 7.8 %    BASOPHILS 1 0.0 - 2.0 %    IMMATURE GRANULOCYTES 0 0.0 - 5.0 %    ABS.  NEUTROPHILS 3.5 1.7 - 8.2 K/UL    ABS. LYMPHOCYTES 0.3 (L) 0.5 - 4.6 K/UL    ABS. MONOCYTES 0.5 0.1 - 1.3 K/UL    ABS. EOSINOPHILS 0.1 0.0 - 0.8 K/UL    ABS. BASOPHILS 0.0 0.0 - 0.2 K/UL    ABS. IMM. GRANS. 0.0 0.0 - 0.5 K/UL   METABOLIC PANEL, COMPREHENSIVE    Collection Time: 05/14/21  5:31 AM   Result Value Ref Range    Sodium 140 136 - 145 mmol/L    Potassium 3.5 3.5 - 5.1 mmol/L    Chloride 106 98 - 107 mmol/L    CO2 29 21 - 32 mmol/L    Anion gap 5 (L) 7 - 16 mmol/L    Glucose 78 65 - 100 mg/dL    BUN 24 (H) 8 - 23 MG/DL    Creatinine 0.91 0.8 - 1.5 MG/DL    GFR est AA >60 >60 ml/min/1.73m2    GFR est non-AA >60 >60 ml/min/1.73m2    Calcium 8.5 8.3 - 10.4 MG/DL    Bilirubin, total 0.2 0.2 - 1.1 MG/DL    ALT (SGPT) 26 12 - 65 U/L    AST (SGOT) 23 15 - 37 U/L    Alk. phosphatase 72 50 - 136 U/L    Protein, total 5.9 (L) 6.3 - 8.2 g/dL    Albumin 2.6 (L) 3.2 - 4.6 g/dL    Globulin 3.3 2.3 - 3.5 g/dL    A-G Ratio 0.8 (L) 1.2 - 3.5          Data Review reviewed  CT   CT Results (most recent):  Results from Hospital Encounter encounter on 05/11/21   CT GUIDE CATH/PERC DRAIN PERITON/RETROPERI FLUID W SI    Narrative Title: CT guided percutaneous abscess drain placement. Indication: Rectal carcinoma    Consent: Informed written and oral consent was obtained from the patient after  explanation of benefits and risks (including, but not limited to: Infection,  Hemorrhage, Visceral Injury). The patient's questions were answered to their  satisfaction. The patient stated understanding and requested that we proceed. Technique: All CT scans at this facility are performed using dose  reduction/dose modulation techniques, as appropriate the performed exam,  including the following:  Automated Exposure Control;  Adjustment of the mA  and/or kV according to patient size (this includes techniques or standardized  protocols for targeted exams where dose is matched to indication/reason for  exam); and Use of Iterative Reconstruction Technique. Procedure:  Sterile barrier technique (including:  cap, mask, sterile gloves,  sterile sheet, hand hygiene, and chlorhexidene for cutaneous antisepsis) were  used. With the patient prone a preliminary CT scan through the pelvis was  performed with radio-opaque markers on the skin. Following routine prep and  drape of the gluteal region, a local field block with 1% lidocaine was achieved. Using intermittent CT technique, a Yueh needle was advanced into the abscess  cavity. Purulent fluid was returned and sent as specimen. Using intermittent CT technique, the needle was exchanged over an Amplatz wire  for a 8 [employer] [address] multipurpose drain. A total of 3 cc of fluid was removed. The  catheter was secured with a suture and StatLok device. A suction bulb was  attached. A final CT scan was performed confirming appropriate drain location. The  dominant portion of the abscess is cephalad to the current drain. If this is not  draining well, it may need to be repositioned under fluoroscopy a follow-up. Specimen:  Sent to "[employer]. Complications: None. Contrast: None. Radiation Exposure Indices: Total Exam DLP = 431 mGy-cm    Medications:  Under physician supervision, 21 minutes of moderate intravenous  conscious sedation was performed by administering Versed, Fentanyl  intravenously. Continuous pulse oximetry, heart rate, and blood pressure  monitoring was performed with an independent, trained observer present. Findings: Presacral pelvic abscess which tracks cephalad centrally within the  pelvis. The dominant portion measures approximately 3 x 4 cm in diameter. The  dominant portion is not amenable to direct access with CT given the surrounding  structures. Impression Uncomplicated CT-guided abscess drain placement, 8 [address], into a  presacral abscess. The dominant portion of the abscess centrally in the pelvis  is not amenable to direct access.  If this portion does not drain well, he will  need drain repositioning under fluoroscopic guidance at follow-up. Defer to  oncology for imaging follow-up to exclude recurrent malignancy.         Assessment:     Mechanical SBO due to adhesions  H/o rectal cancer  Right hydroureter    Plan:     SBO resolved  ADAT   Abx per hospitalist  Routine IR drain care and follow up as scheduled  Routine ostomy care  Discharge when stable per hospitalist perspective    Signed By: Jerrod Triplett     May 14, 2021

## 2021-05-14 NOTE — PROGRESS NOTES
OCCUPATIONAL THERAPY: Initial Assessment, Discharge, and AM 5/14/2021  INPATIENT: OT Visit Days: 1  [place] / [place] / Product Type: Medicare /      NAME/AGE/GENDER: Marlen Beaver is a 66 y.o. male   PRIMARY DIAGNOSIS:  Small bowel obstruction ([facility] [address] [K56.609] Small bowel obstruction ([facility] [address] Small bowel obstruction ([facility] [address]        ICD-10: Treatment Diagnosis:    Generalized Muscle Weakness (M62.81)  Other lack of cordination (R27.8)  Difficulty in walking, Not elsewhere classified (R26.2)  Other abnormalities of gait and mobility (R26.89)   Precautions/Allergies:     Patient has no known allergies. ASSESSMENT:     Mr. Setfania Lizarraga presents supine in bed. He transferred OOB with SBA. He donned socks with SBA. He is SBA with UE and LE ADLS. He ambulated in the room with SBA and returned to his bed. He lives alone and has a friend to assist him. He expressed no concerns. He reported he doesn't want [facility] services. NO skilled OT indicated at this time. Educated him that OT services available if he has a change in status. Will discharge OT services    This section established at most recent assessment             REHAB RECOMMENDATIONS (at time of discharge pending progress):    Placement: It is my opinion, based on this patient's performance to date, that Mr. Marroquin may benefit from being discharged with NO further skilled therapy due to the high likelihood of returning to baseline.   Equipment:   None at this time              OCCUPATIONAL PROFILE AND HISTORY:   History of Present Injury/Illness (Reason for Referral):  See  H and P  Past Medical History/Comorbidities:   Mr. Stefania Lizarraga  has a past medical history of Acute deep vein thrombosis (DVT) of non-extremity vein (5/20/2019), Family history of malignant neoplasm of gastrointestinal tract, Fecal incontinence, Former cigarette smoker, History of rectal cancer, Hypothyroid, Infection and inflammatory reaction due to other internal prosthetic devices, implants and grafts, subsequent encounter (2017), Insomnia, Major depression, Osteoarthritis, hand, Personal history of colonic polyps (9/2013), Personal history of malignant neoplasm of rectum, rectosigmoid junction, and anus (9/2013), and Psychiatric disorder. He also has no past medical history of Adverse effect of anesthesia, Aneurysm ([address] Arrhythmia, Asthma, Autoimmune disease ([address] CAD (coronary artery disease), Chronic kidney disease, Chronic obstructive pulmonary disease ([address] Chronic pain, Coagulation disorder ([address] Diabetes ([address] Difficult intubation, Endocarditis, GERD (gastroesophageal reflux disease), Heart failure ([address] Hypertension, Ill-defined condition, Liver disease, Malignant hyperthermia due to anesthesia, Morbid obesity ([address] Nausea & vomiting, Other ill-defined conditions(799.89), Pseudocholinesterase deficiency, PUD (peptic ulcer disease), Rheumatic fever, Seizures ([address] Stroke ([address] Unspecified adverse effect of anesthesia, or Unspecified sleep apnea. Mr. Penny Martinez  has a past surgical history that includes endoscopy, colon, diagnostic (last 2/3/15); hx polypectomy; hx other surgical (10/7/2013); hx total colectomy (11/2013); hx total colectomy (Right, 8/2014); hx hernia repair (3/2015, 5/2016); hx gi (4/2016); hx colostomy (5/11/16); hx hernia repair; hx other surgical (Bilateral); colonoscopy thru stoma (2/12/2018); colonoscopy (N/A, 2/12/2018); hx vascular access (Left, 4/14); ir insert tunl cvc w/o port over 5 yr (5/20/2019); ir replace cvc tunneled w/o port (5/30/2019); colonoscopy thru stoma,lesn rmvl w/snare (3/5/2021); and colonoscopy (N/A, 3/5/2021).   Social History/Living Environment:   Home Environment: Private residence  # Steps to Enter: 1  Rails to Enter: No  One/Two Story Residence: Two story  # of Interior Steps: 13  Height of Each Step (in): 8 inches  Interior Rails: Left  Lift Chair Available: No  Living Alone: Yes  Support Systems: Friends \ neighbors  Patient Expects to be Discharged to[de-identified] Private residence  Current DME Used/Available at Home: None  Tub or Shower Type: Tub/Shower combination  Prior Level of Function/Work/Activity:   Independent with ADLs lives alone     Number of Personal Factors/Comorbidities that affect the Plan of Care: Brief history (0):  LOW COMPLEXITY   ASSESSMENT OF OCCUPATIONAL PERFORMANCE[de-identified]   Activities of Daily Living:   Basic ADLs (From Assessment) Complex ADLs (From Assessment)   Feeding: Supervision  Oral Facial Hygiene/Grooming: Supervision  Bathing: Stand-by assistance  Upper Body Dressing: Supervision  Lower Body Dressing: Supervision  Toileting: Stand by assistance     Grooming/Bathing/Dressing Activities of Daily Living     Cognitive Retraining  Safety/Judgement: Awareness of environment; Fall prevention                 Functional Transfers  Bathroom Mobility: Stand-by assistance     Bed/Mat Mobility  Rolling: Supervision  Supine to Sit: Stand-by assistance  Sit to Supine: Stand-by assistance  Sit to Stand: Stand-by assistance  Stand to Sit: Stand-by assistance  Bed to Chair: Stand-by assistance  Scooting: Stand-by assistance     Most Recent Physical Functioning:   Gross Assessment:                  Posture:  Posture Assessment:  Forward head, Rounded shoulders  Balance:  Sitting: Intact  Standing: (high guard) Bed Mobility:  Rolling: Supervision  Supine to Sit: Stand-by assistance  Sit to Supine: Stand-by assistance  Scooting: Stand-by assistance  Wheelchair Mobility:     Transfers:  Sit to Stand: Stand-by assistance  Stand to Sit: Stand-by assistance  Stand Pivot Transfers: Stand-by assistance  Bed to Chair: Stand-by assistance  Toilet Transfer : Stand-by assistance  Duration: 20 Minutes            Patient Vitals for the past 6 hrs:   BP BP Patient Position SpO2 Pulse   05/14/21 0741 116/83 Standing 97 % (!) 104   05/14/21 1103 (!) 160/95 At rest;Lying 100 % (!) 57       Mental Status  Neurologic State: Alert, Appropriate for age  Orientation Level: Appropriate for age  Cognition: Appropriate decision making, Appropriate for age attention/concentration, Appropriate safety awareness, Follows commands  Perception: Appears intact  Perseveration: No perseveration noted  Safety/Judgement: Awareness of environment, Fall prevention                          Physical Skills Involved: Activity Tolerance Cognitive Skills Affected (resulting in the inability to perform in a timely and safe manner):  none Psychosocial Skills Affected:  none   Number of elements that affect the Plan of Care: 1-3:  LOW COMPLEXITY   CLINICAL DECISION MAKIN[address] [place] [address] AM-PAC 6 Clicks   Daily Activity Inpatient Short Form  How much help from another person does the patient currently need. .. Total A Lot A Little None   1. Putting on and taking off regular lower body clothing? [] 1   [] 2   [x] 3   [] 4   2. Bathing (including washing, rinsing, drying)? [] 1   [] 2   [x] 3   [] 4   3. Toileting, which includes using toilet, bedpan or urinal?   [] 1   [] 2   [x] 3   [] 4   4. Putting on and taking off regular upper body clothing? [] 1   [] 2   [] 3   [x] 4   5. Taking care of personal grooming such as brushing teeth? [] 1   [] 2   [] 3   [x] 4   6. Eating meals? [] 1   [] 2   [] 3   [x] 4   © , Trustees of [address] [place] [address], under license to [employer]. All rights reserved      Score:  Initial: 21 Most Recent: 21 discharge 21    Interpretation of Tool:  Represents activities that are increasingly more difficult (i.e. Bed mobility, Transfers, Gait).         Use of outcome tool(s) and clinical judgement create a POC that gives a: LOW COMPLEXITY         TREATMENT:   (In addition to Assessment/Re-Assessment sessions the following treatments were rendered)     Pre-treatment Symptoms/Complaints:    Pain: Initial:     010 Post Session:  010     Assessment/Reassessment only, no treatment provided today    Braces/Orthotics/Lines/Etc:   O2 Device: None (Room air)  Treatment/Session Assessment:    Response to Treatment:  tolerated well, does not want OT services, expressed no concerns  Interdisciplinary Collaboration:   Occupational Therapist  Registered Nurse    After treatment position/precautions:   Supine in bed  Bed/Chair-wheels locked  Bed in low position  Call light within reach  RN notified  Side rails x 2   Discharge OT  .   Total Treatment Duration:  OT Patient Time In/Time Out  Time In: 1150  Time Out: NICOLE Bartholomew

## 2021-05-14 NOTE — PROGRESS NOTES
[facility] Hospitalist Progress Note     Name:  Patsy Cheema  Age:75 y.o. Sex:male   :  1945    MRN:  [id number]     Admit Date:  2021    Reason for Admission:  Small bowel obstruction [facility]) [address] [place] [address] Course/Interval history:     Patient is a 77-year-old male with past medical history significant for rectal cancer, colostomy, anxiety/depression, hypothyroidism, diversion proctitis, multiple abdominal surgeries, presented to the ER because of nausea vomiting and cramping abdominal pain. CT abdomen and pelvis with contrast concerning for right presacral tumor recurrence and small bowel obstruction. Dilatation of intrahepatic biliary ducts and pancreatic duct also noticed. General surgery consulted. Oncology consulted. Patient has bilateral hydronephrosis therefore urology consulted. 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 Patient has intrapelvic abscess which was drained. Cultures sent. Currently on ceftriaxone. Added Flagyl. Subjective (21):  Pt is doing well. No fever no chills. No Abdominal pain. Ostomy output improved. Pelvic drain in place. Tolerating full liquid diet. Advanced to GI Soft diet. Review of Systems: 14 point review of systems is otherwise negative with the exception of the elements mentioned above. Assessment & Plan     This is a 77-year-old male with    Small bowel obstruction present on admission  Conservative management per general surgery. Sips of fluids started.  started on clear liquid diet. Advance diet as tolerated.  Advanced to GI soft diet. If tolerates well, plan to discharge pt home tomorrow. Intrapelvic abscess   IR consulted. Patient had drainage of the abscess today. Culture sent. Currently on ceftriaxone. Added Flagyl.  Continue Ceftriaxone, Flagyl. Urinary tract infection POA  Urine cultures  positive for E coli. Continue ceftriaxone.     Bilateral mild hydronephrosis R> L   Renal function within normal limits. Urology consulted. Appreciate recommendations. Recs out-pt follow up. No acute intervention as renal function is normal.     ?  Recurrent rectal cancer  Oncology consulted. Dilated intrahepatic bile duct and pancreatic ducts  LFTs within normal limits. Anxiety/depression  Continue home medications. Hypothyroidism  Levothyroxine    History of diversion proctitis  Mesalamine suppository. Severe Protein calorie malnutrition POA  Nutrition on board. Diet:  DIET NUTRITIONAL SUPPLEMENTS  DIET GI SOFT  DVT PPx: Lovenox  Code status: Full Code  Disposition/Expected LOS: Anticipate discharge home tomorrow       Objective:     Patient Vitals for the past 24 hrs:   Temp Pulse Resp BP SpO2   05/14/21 1103 98 °F (36.7 °C) (!) 57 16 (!) 160/95 100 %   05/14/21 0741 97.2 °F (36.2 °C) (!) 104 20 116/83 97 %   05/14/21 0216 97.9 °F (36.6 °C) 68 18 123/66 96 %   05/13/21 2243 97.4 °F (36.3 °C) 62 17 128/69 98 %   05/13/21 1933 98 °F (36.7 °C) 67 18 107/60 98 %   05/13/21 1623 97.9 °F (36.6 °C) (!) 58 18 (!) 168/80 97 %     Oxygen Therapy  O2 Sat (%): 100 % (05/14/21 1103)  Pulse via Oximetry: 84 beats per minute (05/11/21 0900)  O2 Device: None (Room air) (05/11/21 1529)    Body mass index is 17.49 kg/m². Physical Exam:   General:  Elderly male, chronically ill-appearing, no acute distress, speaking in full sentences, no use of accessory muscles   Lungs:  Clear to auscultation bilaterally   CV:  Regular rate and rhythm with normal S1 and S2   Abdomen:  Soft, nontender, nondistended, hyperactive bowel sounds, no organomegaly, pelvic drain place, ostomy with good out-put.    Extremities:  No cyanosis clubbing or edema   Neuro:  Nonfocal, A&O x3   Psych:  Normal affect     Data Review:  I have reviewed all labs, meds, and studies from the last 24 hours:    Labs:    Recent Results (from the past 24 hour(s))   CBC WITH AUTOMATED DIFF    Collection Time: 05/14/21  5:31 AM   Result Value Ref Range    WBC 4.3 4.3 - 11.1 K/uL    RBC 3.57 (L) 4.23 - 5.6 M/uL    HGB 9.7 (L) 13.6 - 17.2 g/dL    HCT 31.7 (L) 41.1 - 50.3 %    MCV 88.8 79.6 - 97.8 FL    MCH 27.2 26.1 - 32.9 PG    MCHC 30.6 (L) 31.4 - 35.0 g/dL    RDW 13.3 11.9 - 14.6 %    PLATELET 521 997 - 770 K/uL    MPV 8.8 (L) 9.4 - 12.3 FL    ABSOLUTE NRBC 0.00 0.0 - 0.2 K/uL    DF AUTOMATED      NEUTROPHILS 81 (H) 43 - 78 %    LYMPHOCYTES 6 (L) 13 - 44 %    MONOCYTES 11 4.0 - 12.0 %    EOSINOPHILS 1 0.5 - 7.8 %    BASOPHILS 1 0.0 - 2.0 %    IMMATURE GRANULOCYTES 0 0.0 - 5.0 %    ABS. NEUTROPHILS 3.5 1.7 - 8.2 K/UL    ABS. LYMPHOCYTES 0.3 (L) 0.5 - 4.6 K/UL    ABS. MONOCYTES 0.5 0.1 - 1.3 K/UL    ABS. EOSINOPHILS 0.1 0.0 - 0.8 K/UL    ABS. BASOPHILS 0.0 0.0 - 0.2 K/UL    ABS. IMM. GRANS. 0.0 0.0 - 0.5 K/UL   METABOLIC PANEL, COMPREHENSIVE    Collection Time: 05/14/21  5:31 AM   Result Value Ref Range    Sodium 140 136 - 145 mmol/L    Potassium 3.5 3.5 - 5.1 mmol/L    Chloride 106 98 - 107 mmol/L    CO2 29 21 - 32 mmol/L    Anion gap 5 (L) 7 - 16 mmol/L    Glucose 78 65 - 100 mg/dL    BUN 24 (H) 8 - 23 MG/DL    Creatinine 0.91 0.8 - 1.5 MG/DL    GFR est AA >60 >60 ml/min/1.73m2    GFR est non-AA >60 >60 ml/min/1.73m2    Calcium 8.5 8.3 - 10.4 MG/DL    Bilirubin, total 0.2 0.2 - 1.1 MG/DL    ALT (SGPT) 26 12 - 65 U/L    AST (SGOT) 23 15 - 37 U/L    Alk.  phosphatase 72 50 - 136 U/L    Protein, total 5.9 (L) 6.3 - 8.2 g/dL    Albumin 2.6 (L) 3.2 - 4.6 g/dL    Globulin 3.3 2.3 - 3.5 g/dL    A-G Ratio 0.8 (L) 1.2 - 3.5         All Micro Results     Procedure Component Value Units Date/Time    CULTURE, URINE [[id number]]  (Abnormal)  (Susceptibility) Collected: 05/11/21 1248    Order Status: Completed Specimen: Cath Urine Updated: 05/14/21 0857     Special Requests: NO SPECIAL REQUESTS        Culture result:       >100,000 COLONIES/mL ESCHERICHIA COLI                  10,000 to 50,000 COLONIES/mL MIXED SKIN DEMARCO ISOLATED EKG Results     None          Other Studies:  No results found.     Current Meds:   Current Facility-Administered Medications   Medication Dose Route Frequency    metroNIDAZOLE (FLAGYL) IVPB premix 500 mg  500 mg IntraVENous Q8H    clonazePAM (KlonoPIN) tablet 6 mg  6 mg Oral QHS    levothyroxine (SYNTHROID) tablet 125 mcg  125 mcg Oral ACB    sodium chloride (NS) flush 5-40 mL  5-40 mL IntraVENous Q8H    sodium chloride (NS) flush 5-40 mL  5-40 mL IntraVENous PRN    ondansetron (ZOFRAN) injection 4 mg  4 mg IntraVENous Q6H PRN    enoxaparin (LOVENOX) injection 40 mg  40 mg SubCUTAneous DAILY    morphine injection 0.5 mg  0.5 mg IntraVENous Q4H PRN    acetaminophen (TYLENOL) suppository 650 mg  650 mg Rectal Q4H PRN    cefTRIAXone (ROCEPHIN) 1 g in 0.9% sodium chloride (MBP/ADV) 50 mL MBP  1 g IntraVENous Q24H    famotidine (PF) (PEPCID) 20 mg in 0.9% sodium chloride 10 mL injection  20 mg IntraVENous Q24H    mesalamine (CANASA) suppository 1,000 mg  1 Suppository Rectal Q12H    alcohol 62% (NOZIN) nasal  1 Ampule  1 Ampule Topical Q12H    amitriptyline (ELAVIL) tablet 100 mg  100 mg Oral QHS    OLANZapine (ZyPREXA) tablet 7.5 mg  7.5 mg Oral QHS     Facility-Administered Medications Ordered in Other Encounters   Medication Dose Route Frequency    0.9% sodium chloride infusion  25 mL/hr IntraVENous CONTINUOUS    fentaNYL citrate (PF) injection 12.5-100 mcg  12.5-100 mcg IntraVENous Multiple    midazolam (VERSED) injection 0.25-2 mg  0.25-2 mg IntraVENous Multiple       Problem List:  Hospital Problems as of 5/14/2021 Date Reviewed: 12/10/2020          Codes Class Noted - Resolved POA    Pelvic abscess in [facility]) ICD-10-CM: K65.1  ICD-9-CM: 567.22  5/13/2021 - Present Unknown        Anxiety ICD-10-CM: F41.9  ICD-9-CM: 300.00  5/11/2021 - Present Unknown        Depression ICD-10-CM: F32.9  ICD-9-CM: 648  5/11/2021 - Present Unknown        UTI (urinary tract infection) ICD-10-CM: N39.0  ICD-9-CM: 599.0  5/11/2021 - Present Unknown        Bilateral hydronephrosis ICD-10-CM: N13.30  ICD-9-CM: 343  5/11/2021 - Present Unknown        Dilated intrahepatic bile duct ICD-10-CM: K83.8  ICD-9-CM: 576.8  5/11/2021 - Present Unknown        Pancreatic duct dilated ICD-10-CM: K86.89  ICD-9-CM: 577.8  5/11/2021 - Present Unknown        Hypothyroid ICD-10-CM: E03.9  ICD-9-CM: 244.9  6/14/2019 - Present Yes    Overview Signed 6/14/2019 12:12 AM by Shante Patel MD     stable w/med             Severe protein-calorie malnutrition ([facility] [address] ICD-10-CM: E43  ICD-9-CM: 262  5/19/2019 - Present Unknown        * (Principal) Small bowel obstruction ([facility] [address] ICD-10-CM: I90.736  ICD-9-CM: 560.9  3/30/2019 - Present Unknown                   Part of this note was written by using a voice dictation software and the note has been proof read but may still contain some grammatical/other typographical errors.     Signed By: Rafy Frazier MD   [facility] Hospitalist Service    May 14, 2021

## 2021-05-14 NOTE — PROGRESS NOTES
Care Management Interventions  PCP Verified by CM: Yes  Palliative Care Criteria Met (RRAT>21 & CHF Dx)?: No(Dx SBO Risk 17%)  Mode of Transport at Discharge: Other (see comment)(friends)  Transition of Care Consult (CM Consult): Discharge Planning  Discharge Durable Medical Equipment: No  Physical Therapy Consult: Yes  Occupational Therapy Consult: No  Speech Therapy Consult: No  Current Support Network: Lives Alone  Confirm Follow Up Transport: Friends  The Patient and/or Patient Representative was Provided with a Choice of Provider and Agrees with the Discharge Plan?: Yes  Freedom of Choice List was Provided with Basic Dialogue that Supports the Patient's Individualized Plan of Care/Goals, Treatment Preferences and Shares the Quality Data Associated with the Providers?: Yes  Discharge Location  Discharge Placement: Home  Interdisciplinary rounds with Dr. Azalea Hernandes, nursing, CM, and PT for plan of care and goals. Patient medically stable for d/c today. He is alert and oriented x 3, independent of  CM met with patient for d/c plan and goals. Patient states he does not want to leave too early and then get sick again. CM asked his concerns and he states \"I want to make sure I can eat my lunch and feel steadier on my feet. Patient seen by PT and he ambulated 650 feet with stand by assistance. PT recommends home health vs outpatient f/u as indicated. Spoke with patient about d/c options home health, outpatient and did discuss SNF however he is ambulating 650 feet and CM explained may not qualify but would be glad to have a SNF review and see what they said. Per patient he does not want rehab and has declined PT and outpatient f/u. He states that \"all they do is show me some exercises to do at home\". Asked if he had any family support and he stated no but has friends that will look in on him. He is planning on eating lunch which is GI soft diet and see if tolerates it and then ambulate again and see how he feels.  CM encouraged patient to discuss concerns with MD and he voiced understanding. CM spoke with Dr. Edita Martin about patient above concerns and he states he will see how he tolerates his meal at lunch. Patient plans to return home and declined home health f/u. Addendum: Patient d/c cancelled due to him feeling unsteady and weak and lives alone.  Plans to d/c in am .

## 2021-05-14 NOTE — PROGRESS NOTES
Problem: Falls - Risk of  Goal: *Absence of Falls  Description: Document Abdoul Cease Fall Risk and appropriate interventions in the flowsheet. Outcome: Progressing Towards Goal  Note: Fall Risk Interventions:  Mobility Interventions: Communicate number of staff needed for ambulation/transfer         Medication Interventions: Teach patient to arise slowly         History of Falls Interventions:  Investigate reason for fall, Room close to nurse's station, Evaluate medications/consider consulting pharmacy         Problem: Patient Education: Go to Patient Education Activity  Goal: Patient/Family Education  Outcome: Progressing Towards Goal

## 2021-05-14 NOTE — PROGRESS NOTES
States he is having difficulty urinating. Bladder scan performed. Showed 237ml in the bladder.  Will send Md a perfect serve to see if he wants to do anything at this time

## 2021-05-14 NOTE — PROGRESS NOTES
Physician Progress Note      Danielanchvernon Vu  CSN #:                  [id number]  :                       1945  ADMIT DATE:       2021 8:00 AM  DISCH DATE:  RESPONDING  PROVIDER #:        Laura Fuentes MD          QUERY TEXT:    Patient admitted with small bowel obstruction If possible, please document in progress notes and discharge summary if you are evaluating and /or treating any of the following: The medical record reflects the following:  Risk Factors: poor appetite, eliminated animal protein from diet, hx of CA, patient refuses nutrition supplements  Clinical Indicators: severe body fat loss, severe muscle mass loss, mild wt loss 5% over last 6 months per dietitian, BMI 17.5  Treatment: Dietitian consult - almond or soy milk with meals, encouraging non-animal sources of protein  Options provided:  -- Protein calorie malnutrition severe  -- Other - I will add my own diagnosis  -- Disagree - Not applicable / Not valid  -- Disagree - Clinically unable to determine / Unknown  -- Refer to Clinical Documentation Reviewer    PROVIDER RESPONSE TEXT:    This patient has severe protein calorie malnutrition. Query created by:  Jonny Demarco on 2021 10:52 AM      Electronically signed by:  Laura Fuentes MD 2021 12:03 PM

## 2021-05-14 NOTE — PROGRESS NOTES
Pt insists on eating standing up, I suggested he stand on the side of the tray so it would be against the bed so if he began to fall forward the bed would add support. Pt insisted he wanted to stand facing the window and wanted the tray table on the right side of the bed. I will frequently check to make sure pt is doing ok. I also asked CNA to check in on him since he is so far down the krishnamurthy.

## 2021-05-14 NOTE — DISCHARGE SUMMARY
[facility] Hospitalist Discharge Summary     Name:  Roladno Whitman    Age:75 y.o.    Sex:male  :  1945       MRN:  [id number]       Admitting Physician: Domi Shay MD Admit Date: 2021  8:00 AM   Attending Physician: Soumya Mejia MD  Primary Care Physician: Micaela Jalloh DO       Discharge Physician: Demetrius Soriano MD  Discharge date: 21   Discharged Condition: Stable    Indication for Admission:   Chief Complaint   Patient presents with    Abdominal Pain        Reasons for hospitalization:  Hospital Problems as of 2021 Date Reviewed: 12/10/2020          Codes Class Noted - Resolved POA    Pelvic abscess in male [facility]) ICD-10-CM: K65.1  ICD-9-CM: 567.22  2021 - Present Unknown        Anxiety ICD-10-CM: F41.9  ICD-9-CM: 300.00  2021 - Present Unknown        Depression ICD-10-CM: F32.9  ICD-9-CM: 141  2021 - Present Unknown        UTI (urinary tract infection) ICD-10-CM: N39.0  ICD-9-CM: 599.0  2021 - Present Unknown        Bilateral hydronephrosis ICD-10-CM: N13.30  ICD-9-CM: 405  2021 - Present Unknown        Dilated intrahepatic bile duct ICD-10-CM: K83.8  ICD-9-CM: 576.8  2021 - Present Unknown        Pancreatic duct dilated ICD-10-CM: K86.89  ICD-9-CM: 577.8  2021 - Present Unknown        Hypothyroid ICD-10-CM: E03.9  ICD-9-CM: 244.9  2019 - Present Yes    Overview Signed 2019 12:12 AM by Aj Sy MD     stable w/med             Severe protein-calorie malnutrition ([facility] [address] ICD-10-CM: E43  ICD-9-CM: 262  2019 - Present Unknown        * (Principal) Small bowel obstruction ([address] ICD-10-CM: M14.756  ICD-9-CM: 560.9  3/30/2019 - Present Unknown                 Discharge Diagnosis:     Small bowel obstruction present on admission resolved  Intrapelvic abscess present on admission status post IR drainage needs outpatient follow-up with IR in 2 weeks  E. coli urinary tract infection POA  Bilateral mild hydronephrosis right greater than left and needs urology follow-up outpatient   Hx of Rectal cancer outpatient oncology follow-up  Severe Protein calore malnutrition POA  Anxiety/depression  Hypothyroidism  History of diversion proctitis  Did Patient have Sepsis (YES OR NO): NO      Hospital Course/Interval history:  Patient is a 49-year-old male with past medical history significant for rectal cancer, colostomy, anxiety/depression, hypothyroidism, diversion proctitis, multiple abdominal surgeries, presented to the ER because of nausea vomiting and cramping abdominal pain. CT abdomen and pelvis with contrast concerning for right presacral tumor recurrence and small bowel obstruction. Dilatation of intrahepatic biliary ducts and pancreatic duct also noticed. General surgery consulted. Patient had improved ostomy output and abdominal pain resolved. He was started on clear liquid diet and subsequently advanced to GI soft diet. Oncology consulted. 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 Patient has intrapelvic abscess which was drained. Cultures positive for gram-negative rods with final sensitivities pending. Currently on ceftriaxone and Flagyl as inpatient and switched to Ciprofloxacin, Flagyl to complete 2-week course of antibiotics. Urine cultures on admission positive for E. coli. Because of bilateral hydronephrosis urology consulted. Renal function within normal limits and patient able to void on his own. Urology recommends outpatient follow-up.   Patient is discharged home today in a stable condition to follow-up with primary care physician in 3 to 5 days, urology in 1 week, IR in 2 weeks for repeat abscessogram.    Consults:   IP CONSULT TO GENERAL SURGERY  IP CONSULT TO INTERVENTIONAL RADIOLOGY  IP CONSULT TO ONCOLOGY  IP CONSULT TO UROLOGY  IP CONSULT TO INTERVENTIONAL RADIOLOGY  IP CONSULT TO ONCOLOGY  IP CONSULT TO UROLOGY     Disposition: [facility]  Diet:   DIET NUTRITIONAL SUPPLEMENTS  DIET GI SOFT  Code Status: Full Code      Follow up labs/imaging: Cultures from pelvic abscess  Follow up with PCP in 3-5 days, Urology in 1 week, IR in 2 weeks  Pending labs/studies: Cultures from pelvic abscess,     Current Discharge Medication List      START taking these medications    Details   ciprofloxacin HCl (CIPRO) 500 mg tablet Take 1 Tab by mouth two (2) times a day for 10 days. Qty: 20 Tab, Refills: 0  Start date: 5/14/2021, End date: 5/24/2021      metroNIDAZOLE (FLAGYL) 500 mg tablet Take 1 Tab by mouth three (3) times daily for 10 days. Qty: 30 Tab, Refills: 0  Start date: 5/14/2021, End date: 5/24/2021      Saccharomyces boulardii (Florastor) 250 mg capsule Take 2 Caps by mouth two (2) times a day for 10 days. Qty: 40 Cap, Refills: 0  Start date: 5/14/2021, End date: 5/24/2021         CONTINUE these medications which have NOT CHANGED    Details   mesalamine (ROWASA) 4 gram/60 mL enema Insert 60 mL into rectum every other day. Indications: diversion proctitis  Qty: 1000 mL, Refills: 2      levothyroxine (SYNTHROID) 125 mcg tablet TAKE ONE TABLET BY MOUTH ONE TIME DAILY BEFORE BREAKFAST  Qty: 90 Tab, Refills: 3    Associated Diagnoses: Acquired hypothyroidism      clonazePAM (KLONOPIN) 2 mg tablet Take 2 Tabs by mouth nightly. Max Daily Amount: 4 mg. Indications: takes for sleep  Qty: 15 Tab, Refills: 0    Associated Diagnoses: Primary insomnia      amitriptyline (ELAVIL) 100 mg tablet Take 1 Tab by mouth nightly. Dr Dimitry Mcgarry psych  Indications: takes for sleep  Qty: 15 Tab, Refills: 0      OLANZapine (ZYPREXA) 2.5 mg tablet Take 7.5 mg by mouth nightly. Indications: Takes 3 x 2.5 mg tabs      Cholecalciferol, Vitamin D3, (VITAMIN D3) 2,000 unit cap capsule Take 2,000 Units by mouth two (2) times a day.   Qty: 180 Cap, Refills: 3             Medications Discontinued During This Encounter   Medication Reason    morphine injection 1 mg     acetaminophen (TYLENOL) tablet 650 mg     acetaminophen (TYLENOL) suppository 650 mg     polyethylene glycol (MIRALAX) packet 17 g     promethazine (PHENERGAN) tablet 12.5 mg     famotidine (PF) (PEPCID) 20 mg in 0.9% sodium chloride 10 mL injection     LORazepam (ATIVAN) injection 1 mg     clonazePAM (KlonoPIN) tablet 6 mg     0.9% sodium chloride infusion     clonazePAM (KlonoPIN) tablet 2 mg     levothyroxine (SYNTHROID) tablet 125 mcg     dextrose 5 % - 0.45% NaCl infusion          Follow Up Orders: Follow-up Appointments   Procedures    FOLLOW UP VISIT Appointment in: 3 - 5 Days F/U WITH PCP IN 3-5 DAYS. F/U WITH UROLOGY IN 1 WEEK F/U WITH IR IN 2 WEEKS FOR REPEAT ABSCESSOGRAM     F/U WITH PCP IN 3-5 DAYS. F/U WITH UROLOGY IN 1 WEEK  F/U WITH IR IN 2 WEEKS FOR REPEAT ABSCESSOGRAM     Standing Status:   Standing     Number of Occurrences:   1     Order Specific Question:   Appointment in     Answer:   3 - 5 Days         Follow-up Information     Follow up With Specialties Details Why Contact Info    Geni Sanchez MD Oncology, Internal Medicine  per sammy will call patient at home [address],  Internal Medicine   [address] [place] [address]  [phone]              Discharge Exam:    Patient Vitals for the past 24 hrs:   Temp Pulse Resp BP SpO2   05/14/21 1103 98 °F (36.7 °C) (!) 57 16 (!) 160/95 100 %   05/14/21 0741 97.2 °F (36.2 °C) (!) 104 20 116/83 97 %   05/14/21 0216 97.9 °F (36.6 °C) 68 18 123/66 96 %   05/13/21 2243 97.4 °F (36.3 °C) 62 17 128/69 98 %   05/13/21 1933 98 °F (36.7 °C) 67 18 107/60 98 %   05/13/21 1623 97.9 °F (36.6 °C) (!) 58 18 (!) 168/80 97 %     Oxygen Therapy  O2 Sat (%): 100 % (05/14/21 1103)  Pulse via Oximetry: 84 beats per minute (05/11/21 0900)  O2 Device: None (Room air) (05/11/21 1529)    Estimated body mass index is 17.49 kg/m² as calculated from the following:    Height as of this encounter: 5' 8\" (1.727 m).     Weight as of 5/13/21: 52.2 kg (115 lb). Intake/Output Summary (Last 24 hours) at 5/14/2021 1124  Last data filed at 5/14/2021 0847  Gross per 24 hour   Intake 911 ml   Output    Net 911 ml       *Note that automatically entered I/Os may not be accurate; dependent on patient compliance with collection and accurate  by assistants. Physical Exam:   General:  No acute distress, speaking in full sentences, no use of accessory muscles   HEENT:  Pupils equal and reactive to light and accommodation, oropharynx is clear   Neck:   Supple, no lymphadenopathy, no JVD   Lungs:  Clear to auscultation bilaterally   CV:  Regular rate and rhythm with normal S1 and S2   Abdomen: Soft, nontender, nondistended, normoactive bowel sounds, ostomy in place, with minimal output pelvic drain in place , no organomegaly,  Extremities:  No cyanosis clubbing or edema   Neuro:  Nonfocal, A&O x3   Psych:  Normal affect       All Labs from Last 24 Hrs:  Recent Results (from the past 24 hour(s))   CULTURE, BODY FLUID W GRAM STAIN    Collection Time: 05/13/21  1:04 PM    Specimen: Abscess   Result Value Ref Range    Special Requests: NO SPECIAL REQUESTS      GRAM STAIN WBCS SEEN TOO NUMEROUS TO COUNT      GRAM STAIN RARE GRAM NEGATIVE RODS      Culture result: SCANT GRAM NEGATIVE RODS SUBCULTURE IN PROGRESS (A)     CBC WITH AUTOMATED DIFF    Collection Time: 05/14/21  5:31 AM   Result Value Ref Range    WBC 4.3 4.3 - 11.1 K/uL    RBC 3.57 (L) 4.23 - 5.6 M/uL    HGB 9.7 (L) 13.6 - 17.2 g/dL    HCT 31.7 (L) 41.1 - 50.3 %    MCV 88.8 79.6 - 97.8 FL    MCH 27.2 26.1 - 32.9 PG    MCHC 30.6 (L) 31.4 - 35.0 g/dL    RDW 13.3 11.9 - 14.6 %    PLATELET 847 654 - 631 K/uL    MPV 8.8 (L) 9.4 - 12.3 FL    ABSOLUTE NRBC 0.00 0.0 - 0.2 K/uL    DF AUTOMATED      NEUTROPHILS 81 (H) 43 - 78 %    LYMPHOCYTES 6 (L) 13 - 44 %    MONOCYTES 11 4.0 - 12.0 %    EOSINOPHILS 1 0.5 - 7.8 %    BASOPHILS 1 0.0 - 2.0 %    IMMATURE GRANULOCYTES 0 0.0 - 5.0 %    ABS. NEUTROPHILS 3.5 1.7 - 8.2 K/UL    ABS. LYMPHOCYTES 0.3 (L) 0.5 - 4.6 K/UL    ABS. MONOCYTES 0.5 0.1 - 1.3 K/UL    ABS. EOSINOPHILS 0.1 0.0 - 0.8 K/UL    ABS. BASOPHILS 0.0 0.0 - 0.2 K/UL    ABS. IMM. GRANS. 0.0 0.0 - 0.5 K/UL   METABOLIC PANEL, COMPREHENSIVE    Collection Time: 05/14/21  5:31 AM   Result Value Ref Range    Sodium 140 136 - 145 mmol/L    Potassium 3.5 3.5 - 5.1 mmol/L    Chloride 106 98 - 107 mmol/L    CO2 29 21 - 32 mmol/L    Anion gap 5 (L) 7 - 16 mmol/L    Glucose 78 65 - 100 mg/dL    BUN 24 (H) 8 - 23 MG/DL    Creatinine 0.91 0.8 - 1.5 MG/DL    GFR est AA >60 >60 ml/min/1.73m2    GFR est non-AA >60 >60 ml/min/1.73m2    Calcium 8.5 8.3 - 10.4 MG/DL    Bilirubin, total 0.2 0.2 - 1.1 MG/DL    ALT (SGPT) 26 12 - 65 U/L    AST (SGOT) 23 15 - 37 U/L    Alk. phosphatase 72 50 - 136 U/L    Protein, total 5.9 (L) 6.3 - 8.2 g/dL    Albumin 2.6 (L) 3.2 - 4.6 g/dL    Globulin 3.3 2.3 - 3.5 g/dL    A-G Ratio 0.8 (L) 1.2 - 3.5         All Micro Results     Procedure Component Value Units Date/Time    CULTURE, URINE [[id number]]  (Abnormal)  (Susceptibility) Collected: 05/11/21 1248    Order Status: Completed Specimen: Cath Urine Updated: 05/14/21 3923     Special Requests: NO SPECIAL REQUESTS        Culture result:       >100,000 COLONIES/mL ESCHERICHIA COLI                  10,000 to 50,000 COLONIES/mL MIXED SKIN DEMARCO ISOLATED                SARS-CoV-2 Lab Results  \"Novel Coronavirus\" Test: No results found for: COV2NT   \"Emergent Disease\" Test: No results found for: EDPR  \"SARS-COV-2\" Test: No results found for: XGCOVT  Rapid Test:   No results found for: COVR         Diagnostic Imaging/Tests:   Ct Abd Pelv W Cont    Result Date: 5/11/2021  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 Consider obtaining a PET/CT scan. Small bowel obstruction containing fecal-like material. This is worse when compared to 8/31/2020. Bilateral hydronephrosis of uncertain etiology. Was not present on the prior study. Dilatation of the intrahepatic biliary ducts and pancreatic duct. This is more prominent on the study secondary to the use of intravenous contrast. Correlation with LFTs is recommended. Date of Dictation: 5/11/2021 10:10 AM     Ct Guide Cath/perc Drain Periton/retroperi Fluid W Si    Result Date: 4/67/8511  Uncomplicated CT-guided abscess drain placement, 8 [place], into a presacral abscess. The dominant portion of the abscess centrally in the pelvis is not amenable to direct access. If this portion does not drain well, he will need drain repositioning under fluoroscopic guidance at follow-up. Defer to oncology for imaging follow-up to exclude recurrent malignancy. Echocardiogram/EKG results:  No results found for this visit on 05/11/21. EKG Results     None          Results for orders placed or performed during the hospital encounter of 06/13/19   EKG, 12 LEAD, INITIAL   Result Value Ref Range    Ventricular Rate 117 BPM    Atrial Rate 117 BPM    P-R Interval 170 ms    QRS Duration 92 ms    Q-T Interval 290 ms    QTC Calculation (Bezet) 404 ms    Calculated P Axis 64 degrees    Calculated R Axis 70 degrees    Calculated T Axis 36 degrees    Diagnosis       !! AGE AND GENDER SPECIFIC ECG ANALYSIS !! Sinus tachycardia  Nonspecific ST abnormality  When compared with ECG of 19-NOV-2015 17:48,  Vent. rate has increased BY  52 BPM  ST now depressed in Anterior leads  Nonspecific T wave abnormality no longer evident in Anterolateral leads  Confirmed by Alhponso Martinez MD (), MAX RAYO ([id number]) on 6/13/2019 5:07:12 PM         Procedures done this admission:  * No surgery found *        Time spent in patient discharge planning and coordination 44 minutes.       Signed By: Alejo Michelle MD   [facility] Hospitalist Service    May 14, 2021  11:24 AM

## 2021-05-15 ENCOUNTER — HOME HEALTH ADMISSION (OUTPATIENT)
Dept: HOME HEALTH SERVICES | Facility: HOME HEALTH | Age: 76
End: 2021-05-15
Payer: MEDICARE

## 2021-05-15 VITALS
HEART RATE: 89 BPM | SYSTOLIC BLOOD PRESSURE: 131 MMHG | HEIGHT: 68 IN | RESPIRATION RATE: 18 BRPM | BODY MASS INDEX: 17.43 KG/M2 | WEIGHT: 115 LBS | TEMPERATURE: 98.1 F | OXYGEN SATURATION: 95 % | DIASTOLIC BLOOD PRESSURE: 78 MMHG

## 2021-05-15 PROCEDURE — 74011250636 HC RX REV CODE- 250/636: Performed by: HOSPITALIST

## 2021-05-15 PROCEDURE — 74011250637 HC RX REV CODE- 250/637: Performed by: HOSPITALIST

## 2021-05-15 PROCEDURE — 51798 US URINE CAPACITY MEASURE: CPT

## 2021-05-15 RX ORDER — TAMSULOSIN HYDROCHLORIDE 0.4 MG/1
0.4 CAPSULE ORAL DAILY
Qty: 30 CAP | Refills: 0 | Status: SHIPPED | OUTPATIENT
Start: 2021-05-16 | End: 2021-05-31

## 2021-05-15 RX ORDER — TAMSULOSIN HYDROCHLORIDE 0.4 MG/1
0.4 CAPSULE ORAL DAILY
Status: DISCONTINUED | OUTPATIENT
Start: 2021-05-15 | End: 2021-05-15 | Stop reason: HOSPADM

## 2021-05-15 RX ADMIN — LEVOTHYROXINE SODIUM 125 MCG: 0.12 TABLET ORAL at 08:24

## 2021-05-15 RX ADMIN — Medication 10 ML: at 05:04

## 2021-05-15 RX ADMIN — METRONIDAZOLE 500 MG: 500 INJECTION, SOLUTION INTRAVENOUS at 08:24

## 2021-05-15 RX ADMIN — TAMSULOSIN HYDROCHLORIDE 0.4 MG: 0.4 CAPSULE ORAL at 09:23

## 2021-05-15 RX ADMIN — Medication 1 AMPULE: at 08:23

## 2021-05-15 RX ADMIN — ENOXAPARIN SODIUM 40 MG: 40 INJECTION SUBCUTANEOUS at 08:23

## 2021-05-15 NOTE — PROGRESS NOTES
SW met with MD who anticipates discharge today. Patient has declined [facility] [place] but would benefit from services. SW met with patient again who is now receptive to [facility] [place] services (PT/RN/CNA). His preference is for [facility]. Referral sent. MD notified.       Evelyne Borden LMSW     [facility]    * [email or web address]

## 2021-05-15 NOTE — DISCHARGE SUMMARY
[address] [facility] Hospitalist Discharge Summary     Name:  Nena Roche    Age:75 y.o.    Sex:male  :  1945       MRN:  [id number]       Admitting Physician: Pauly Mojica MD Admit Date: 2021  8:00 AM   Attending Physician: Greer Farias MD  Primary Care Physician: Anh Campos DO       Discharge Physician: Fran Mosqueda MD  Discharge date: 05/15/21   Discharged Condition: Stable    Indication for Admission:   Chief Complaint   Patient presents with    Abdominal Pain        Reasons for hospitalization:  Hospital Problems as of 5/15/2021 Date Reviewed: 12/10/2020          Codes Class Noted - Resolved POA    Pelvic abscess in male [facility]) ICD-10-CM: K65.1  ICD-9-CM: 567.22  2021 - Present Unknown        Anxiety ICD-10-CM: F41.9  ICD-9-CM: 300.00  2021 - Present Unknown        Depression ICD-10-CM: F32.9  ICD-9-CM: 608  2021 - Present Unknown        UTI (urinary tract infection) ICD-10-CM: N39.0  ICD-9-CM: 599.0  2021 - Present Unknown        Bilateral hydronephrosis ICD-10-CM: N13.30  ICD-9-CM: 689  2021 - Present Unknown        Dilated intrahepatic bile duct ICD-10-CM: K83.8  ICD-9-CM: 576.8  2021 - Present Unknown        Pancreatic duct dilated ICD-10-CM: K86.89  ICD-9-CM: 577.8  2021 - Present Unknown        Hypothyroid ICD-10-CM: E03.9  ICD-9-CM: 244.9  2019 - Present Yes    Overview Signed 2019 12:12 AM by Lilly Rosario MD     stable w/med             Severe protein-calorie malnutrition ([facility] [address] ICD-10-CM: E43  ICD-9-CM: 262  2019 - Present Unknown        * (Principal) Small bowel obstruction ([facility] [address] ICD-10-CM: V36.018  ICD-9-CM: 560.9  3/30/2019 - Present Unknown                 Discharge Diagnosis:     Small bowel obstruction present on admission resolved  Intrapelvic abscess present on admission status post IR drainage needs outpatient follow-up with IR in 2 weeks  E. coli urinary tract infection POA  Acute Urinary retention with Bilateral mild hydronephrosis right greater than left and needs urology follow-up outpatient   Hx of Rectal cancer outpatient oncology follow-up  Severe Protein calore malnutrition POA  Anxiety/depression  Hypothyroidism  History of diversion proctitis  Did Patient have Sepsis (YES OR NO): NO      Hospital Course/Interval history:  Patient is a 78-year-old male with past medical history significant for rectal cancer, colostomy, anxiety/depression, hypothyroidism, diversion proctitis, multiple abdominal surgeries, presented to the ER because of nausea vomiting and cramping abdominal pain. CT abdomen and pelvis with contrast concerning for right presacral tumor recurrence and small bowel obstruction. Dilatation of intrahepatic biliary ducts and pancreatic duct also noticed. General surgery consulted. Patient had improved ostomy output and abdominal pain resolved. He was started on clear liquid diet and subsequently advanced to GI soft diet. Pt tolerated diet well without nausea, vomiting, abdominal pain. Oncology consulted. 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 Patient has intrapelvic abscess which was drained. Cultures positive for gram-negative rods with final sensitivities pending. Currently on ceftriaxone and Flagyl as inpatient and switched to Ciprofloxacin, Flagyl to complete 2-week course of antibiotics. Urine cultures on admission positive for E. coli. Because of bilateral hydronephrosis urology consulted. Renal function within normal limits and   Urology recommends outpatient follow-up. Pt was started on Flomax. Bladder scan this am shows 450 ml urine. Patient unable to void on his own. Bladder scan repeated and it showed 525 mL. Discussed with pt about Bishop catheter and patient agreeable to go home with Bishop catheter.   Patient is discharged home with home health today in a stable condition to follow-up with primary care physician in 3 to 5 days, Urology in 1 week, IR in 2 weeks for repeat abscessogram.    Addendum: 5/17 Cultures from pelvic abscess-positive for E. coli and Enterococcus faecalis. Augmentin e prescribed to his pharmacy. Contacted patient. He was advised to stop taking ciprofloxacin and Flagyl and continue Augmentin for 10 days. Consults:   IP CONSULT TO GENERAL SURGERY  IP CONSULT TO INTERVENTIONAL RADIOLOGY  IP CONSULT TO ONCOLOGY  IP CONSULT TO UROLOGY  IP CONSULT TO INTERVENTIONAL RADIOLOGY  IP CONSULT TO ONCOLOGY  IP CONSULT TO UROLOGY     Disposition: [place] [facility] [place]  Diet:   DIET NUTRITIONAL SUPPLEMENTS  DIET GI SOFT  Code Status: Full Code      Follow up labs/imaging: Cultures from pelvic abscess  Follow up with PCP in 3-5 days, Urology in 1 week, IR in 2 weeks  Pending labs/studies: Cultures from pelvic abscess,     Current Discharge Medication List      START taking these medications    Details   tamsulosin (FLOMAX) 0.4 mg capsule Take 1 Cap by mouth daily for 30 days. Qty: 30 Cap, Refills: 0  Start date: 5/16/2021, End date: 6/15/2021      ciprofloxacin HCl (CIPRO) 500 mg tablet Take 1 Tab by mouth two (2) times a day for 10 days. Qty: 20 Tab, Refills: 0  Start date: 5/14/2021, End date: 5/24/2021      metroNIDAZOLE (FLAGYL) 500 mg tablet Take 1 Tab by mouth three (3) times daily for 10 days. Qty: 30 Tab, Refills: 0  Start date: 5/14/2021, End date: 5/24/2021      Saccharomyces boulardii (Florastor) 250 mg capsule Take 2 Caps by mouth two (2) times a day for 10 days. Qty: 40 Cap, Refills: 0  Start date: 5/14/2021, End date: 5/24/2021         CONTINUE these medications which have NOT CHANGED    Details   mesalamine (ROWASA) 4 gram/60 mL enema Insert 60 mL into rectum every other day.  Indications: diversion proctitis  Qty: 1000 mL, Refills: 2      levothyroxine (SYNTHROID) 125 mcg tablet TAKE ONE TABLET BY MOUTH ONE TIME DAILY BEFORE BREAKFAST  Qty: 90 Tab, Refills: 3    Associated Diagnoses: Acquired hypothyroidism      clonazePAM (KLONOPIN) 2 mg tablet Take 2 Tabs by mouth nightly. Max Daily Amount: 4 mg. Indications: takes for sleep  Qty: 15 Tab, Refills: 0    Associated Diagnoses: Primary insomnia      amitriptyline (ELAVIL) 100 mg tablet Take 1 Tab by mouth nightly. Dr Brenner Hem psych  Indications: takes for sleep  Qty: 15 Tab, Refills: 0      OLANZapine (ZYPREXA) 2.5 mg tablet Take 7.5 mg by mouth nightly. Indications: Takes 3 x 2.5 mg tabs      Cholecalciferol, Vitamin D3, (VITAMIN D3) 2,000 unit cap capsule Take 2,000 Units by mouth two (2) times a day. Qty: 180 Cap, Refills: 3             Medications Discontinued During This Encounter   Medication Reason    morphine injection 1 mg     acetaminophen (TYLENOL) tablet 650 mg     acetaminophen (TYLENOL) suppository 650 mg     polyethylene glycol (MIRALAX) packet 17 g     promethazine (PHENERGAN) tablet 12.5 mg     famotidine (PF) (PEPCID) 20 mg in 0.9% sodium chloride 10 mL injection     LORazepam (ATIVAN) injection 1 mg     clonazePAM (KlonoPIN) tablet 6 mg     0.9% sodium chloride infusion     clonazePAM (KlonoPIN) tablet 2 mg     levothyroxine (SYNTHROID) tablet 125 mcg     dextrose 5 % - 0.45% NaCl infusion          Follow Up Orders: Follow-up Appointments   Procedures    FOLLOW UP VISIT Appointment in: 3 - 5 Days F/U WITH PCP IN 3-5 DAYS. F/U WITH UROLOGY IN 1 WEEK F/U WITH IR IN 2 WEEKS FOR REPEAT ABSCESSOGRAM     F/U WITH PCP IN 3-5 DAYS.   F/U WITH UROLOGY IN 1 WEEK  F/U WITH IR IN 2 WEEKS FOR REPEAT ABSCESSOGRAM     Standing Status:   Standing     Number of Occurrences:   1     Order Specific Question:   Appointment in     Answer:   3 - 5 Days         Follow-up Information     Follow up With Specialties Details Why Jordan Stallworth MD Oncology, Internal Medicine  per sammy will call patient at home [id number] [facility]      Radha Cobb DO Internal Medicine  Called for schedule with no answer, call to schedule a follow up appoinment for 3-5 days post hospital discharge. [address] Marisa Gorman MD Urology  Called for schedule with no answer, call to schedule a follow up appointment within 1 week of hospital discharge. Jaspreet Bañuelos  Foster         Call IR to schedule a follow up appointment for 2 weeks post hospital discharge for repeat abcessogram.             Discharge Exam:    Patient Vitals for the past 24 hrs:   Temp Pulse Resp BP SpO2   05/15/21 0758 98.1 °F (36.7 °C) 89 18 131/78 95 %   05/15/21 0217 98 °F (36.7 °C) 64 17 113/60 100 %   05/14/21 2200 97.6 °F (36.4 °C) 64 18 (!) 149/82 98 %   05/14/21 1915 97.9 °F (36.6 °C) 66 18 135/72 97 %   05/14/21 1447  68 18 (!) 140/76 100 %     Oxygen Therapy  O2 Sat (%): 95 % (05/15/21 0758)  Pulse via Oximetry: 84 beats per minute (05/11/21 0900)  O2 Device: None (Room air) (05/15/21 0827)    Estimated body mass index is 17.49 kg/m² as calculated from the following:    Height as of this encounter: 5' 8\" (1.727 m). Weight as of 5/13/21: 52.2 kg (115 lb). Intake/Output Summary (Last 24 hours) at 5/15/2021 1112  Last data filed at 5/14/2021 1742  Gross per 24 hour   Intake 486.97 ml   Output 300 ml   Net 186.97 ml       *Note that automatically entered I/Os may not be accurate; dependent on patient compliance with collection and accurate  by assistants.     Physical Exam:   General:  No acute distress, speaking in full sentences, no use of accessory muscles   HEENT:  Pupils equal and reactive to light and accommodation, oropharynx is clear   Neck:   Supple, no lymphadenopathy, no JVD   Lungs:  Clear to auscultation bilaterally   CV:  Regular rate and rhythm with normal S1 and S2   Abdomen: Soft, nontender, nondistended, normoactive bowel sounds, ostomy in place, pelvic drain in place with minimal out-put , no organomegaly,  Extremities:  No cyanosis clubbing or edema Neuro:  Nonfocal, A&O x3   Psych:  Normal affect       All Labs from Last 24 Hrs:  No results found for this or any previous visit (from the past 24 hour(s)). All Micro Results     Procedure Component Value Units Date/Time    CULTURE, URINE [[id number]]  (Abnormal)  (Susceptibility) Collected: 05/11/21 1248    Order Status: Completed Specimen: Cath Urine Updated: 05/14/21 0843     Special Requests: NO SPECIAL REQUESTS        Culture result:       >100,000 COLONIES/mL ESCHERICHIA COLI                  10,000 to 50,000 COLONIES/mL MIXED SKIN DEMARCO ISOLATED                SARS-CoV-2 Lab Results  \"Novel Coronavirus\" Test: No results found for: COV2NT   \"Emergent Disease\" Test: No results found for: EDPR  \"SARS-COV-2\" Test: No results found for: XGCOVT  Rapid Test:   No results found for: COVR         Diagnostic Imaging/Tests:   Ct Abd Pelv W Cont    Result Date: 5/11/2021  Findings worrisome for right presacral tumor recurrence. Consider obtaining a PET/CT scan. Small bowel obstruction containing fecal-like material. This is worse when compared to 8/31/2020. Bilateral hydronephrosis of uncertain etiology. Was not present on the prior study. Dilatation of the intrahepatic biliary ducts and pancreatic duct. This is more prominent on the study secondary to the use of intravenous contrast. Correlation with LFTs is recommended. Date of Dictation: 5/11/2021 10:10 AM     Ct Guide Cath/perc Drain Periton/retroperi Fluid W Si    Result Date: 3/81/4936  Uncomplicated CT-guided abscess drain placement, 8 Frisian, into a presacral abscess. The dominant portion of the abscess centrally in the pelvis is not amenable to direct access. If this portion does not drain well, he will need drain repositioning under fluoroscopic guidance at follow-up. Defer to oncology for imaging follow-up to exclude recurrent malignancy. Echocardiogram/EKG results:  No results found for this visit on 05/11/21.     EKG Results     None Results for orders placed or performed during the hospital encounter of 06/13/19   EKG, 12 LEAD, INITIAL   Result Value Ref Range    Ventricular Rate 117 BPM    Atrial Rate 117 BPM    P-R Interval 170 ms    QRS Duration 92 ms    Q-T Interval 290 ms    QTC Calculation (Bezet) 404 ms    Calculated P Axis 64 degrees    Calculated R Axis 70 degrees    Calculated T Axis 36 degrees    Diagnosis       !! AGE AND GENDER SPECIFIC ECG ANALYSIS !! Sinus tachycardia  Nonspecific ST abnormality  When compared with ECG of 19-NOV-2015 17:48,  Vent. rate has increased BY  52 BPM  ST now depressed in Anterior leads  Nonspecific T wave abnormality no longer evident in Anterolateral leads  Confirmed by Mili Marinelli MD (), MAX RAYO ([id number]) on 6/13/2019 5:07:12 PM         Procedures done this admission:  * No surgery found *        Time spent in patient discharge planning and coordination 38 minutes.       Signed By: Nena Milton MD   [facility]    May 15, 2021  11:24 AM

## 2021-05-17 LAB
BACTERIA SPEC CULT: ABNORMAL
BACTERIA SPEC CULT: ABNORMAL
GRAM STN SPEC: ABNORMAL
GRAM STN SPEC: ABNORMAL
SERVICE CMNT-IMP: ABNORMAL

## 2021-05-17 RX ORDER — AMOXICILLIN AND CLAVULANATE POTASSIUM 875; 125 MG/1; MG/1
1 TABLET, FILM COATED ORAL EVERY 12 HOURS
Qty: 20 TAB | Refills: 0 | Status: SHIPPED | OUTPATIENT
Start: 2021-05-17 | End: 2021-05-27

## 2021-05-18 ENCOUNTER — HOME CARE VISIT (OUTPATIENT)
Dept: SCHEDULING | Facility: HOME HEALTH | Age: 76
End: 2021-05-18
Payer: MEDICARE

## 2021-05-18 VITALS
HEART RATE: 86 BPM | DIASTOLIC BLOOD PRESSURE: 86 MMHG | SYSTOLIC BLOOD PRESSURE: 110 MMHG | TEMPERATURE: 98.9 F | OXYGEN SATURATION: 98 % | RESPIRATION RATE: 16 BRPM

## 2021-05-18 PROCEDURE — 400018 HH-NO PAY CLAIM PROCEDURE

## 2021-05-18 PROCEDURE — 400013 HH SOC

## 2021-05-18 PROCEDURE — G0299 HHS/HOSPICE OF RN EA 15 MIN: HCPCS

## 2021-05-18 PROCEDURE — 3331090002 HH PPS REVENUE DEBIT

## 2021-05-18 PROCEDURE — 3331090001 HH PPS REVENUE CREDIT

## 2021-05-19 PROCEDURE — 3331090002 HH PPS REVENUE DEBIT

## 2021-05-19 PROCEDURE — 3331090001 HH PPS REVENUE CREDIT

## 2021-05-20 ENCOUNTER — HOME CARE VISIT (OUTPATIENT)
Dept: SCHEDULING | Facility: HOME HEALTH | Age: 76
End: 2021-05-20
Payer: MEDICARE

## 2021-05-20 ENCOUNTER — HOSPITAL ENCOUNTER (OUTPATIENT)
Dept: INTERVENTIONAL RADIOLOGY/VASCULAR | Age: 76
Discharge: HOME | End: 2021-05-20
Attending: RADIOLOGY
Payer: MEDICARE

## 2021-05-20 ENCOUNTER — HOSPITAL ENCOUNTER (OUTPATIENT)
Dept: CT IMAGING | Age: 76
Discharge: HOME | End: 2021-05-20
Attending: RADIOLOGY
Payer: MEDICARE

## 2021-05-20 VITALS
WEIGHT: 115 LBS | BODY MASS INDEX: 17.43 KG/M2 | HEIGHT: 68 IN | DIASTOLIC BLOOD PRESSURE: 81 MMHG | TEMPERATURE: 97.8 F | OXYGEN SATURATION: 97 % | HEART RATE: 75 BPM | RESPIRATION RATE: 16 BRPM | SYSTOLIC BLOOD PRESSURE: 154 MMHG

## 2021-05-20 PROCEDURE — 74011000250 HC RX REV CODE- 250: Performed by: RADIOLOGY

## 2021-05-20 PROCEDURE — C1769 GUIDE WIRE: HCPCS

## 2021-05-20 PROCEDURE — 77030002916 HC SUT ETHLN J&J -A

## 2021-05-20 PROCEDURE — 77030003666 CT GUIDE CATH/PERC DRAIN PERITON/RETROPERI FLUID W SI

## 2021-05-20 PROCEDURE — 3331090002 HH PPS REVENUE DEBIT

## 2021-05-20 PROCEDURE — C1894 INTRO/SHEATH, NON-LASER: HCPCS

## 2021-05-20 PROCEDURE — 74011000636 HC RX REV CODE- 636: Performed by: RADIOLOGY

## 2021-05-20 PROCEDURE — 49424 ASSESS CYST CONTRAST INJECT: CPT

## 2021-05-20 PROCEDURE — 3331090001 HH PPS REVENUE CREDIT

## 2021-05-20 PROCEDURE — 49406 IMAGE CATH FLUID PERI/RETRO: CPT

## 2021-05-20 PROCEDURE — 77030027478 HC TBNG JP W BLB MRTM -B

## 2021-05-20 RX ORDER — LIDOCAINE HYDROCHLORIDE 20 MG/ML
20-300 INJECTION, SOLUTION INFILTRATION; PERINEURAL
Status: DISCONTINUED | OUTPATIENT
Start: 2021-05-20 | End: 2021-05-24 | Stop reason: HOSPADM

## 2021-05-20 RX ADMIN — IOPAMIDOL 10 ML: 612 INJECTION, SOLUTION INTRAVENOUS at 13:30

## 2021-05-20 RX ADMIN — LIDOCAINE HYDROCHLORIDE 80 MG: 20 INJECTION, SOLUTION INFILTRATION; PERINEURAL at 14:01

## 2021-05-20 RX ADMIN — LIDOCAINE HYDROCHLORIDE 140 MG: 20 INJECTION, SOLUTION INFILTRATION; PERINEURAL at 13:22

## 2021-05-20 NOTE — PROCEDURES
Department of Interventional Radiology  [phone]        Interventional Radiology Brief Procedure Note    Patient: Joe Iyer MRN: [id number]  SSN: [id number]    YOB: 1945  Age: 66 y.o. Sex: male      Date of Procedure: 5/20/2021    Pre-Procedure Diagnosis: pelvic abscess    Post-Procedure Diagnosis: SAME    Procedure(s): Image Guided Drain Placement    Brief Description of Procedure: as above    Performed By: Samm Ochoa MD     Assistants: None    Anesthesia:Lidocaine    Estimated Blood Loss: None    Specimens:  None    Implants: All Purpose Drain    Findings: existing drain has pulled out.   New drain placed under CT    Complications: None    Recommendations: external drainage     Follow Up: 2 weeks    Signed By: Samm Ochoa MD     May 20, 2021

## 2021-05-20 NOTE — PROGRESS NOTES
Pt to IR [address] via stretcher.   IR Nurse Pre-Procedure Checklist Part 2          Consent signed: Yes    H&P complete:  Not applicable    Antibiotics: Not applicable    Airway/Mallampati Done: Not applicable    Shaved: Not applicable    Pregnancy Form:Not applicable    Patient Position: Yes    MD Side: Yes     Biopsy Worksheet: Not applicable    Specimen Medium: Not applicable

## 2021-05-20 NOTE — PROGRESS NOTES
This RN contacted [facility] and explained that patient had a appointment with them at 1300 and that he would be late for appointment.  states that \"there is no way Dr Yesenia Ramos will be able to work him in today. He will have to reschedule his appointment. \"

## 2021-05-20 NOTE — DISCHARGE INSTRUCTIONS
[address]  Department of Interventional Radiology  [facility]  [phone] Office  [phone] Fax    GENERAL DRAIN DISCHARGE INSTRUCTIONS    General Information:     A plastic catheter has been inserted through your skin and into area that needs to be drained. Your doctor ordered this procedure to be done in the event of an abscess, or other fluid collection in your body. You will notice a drainage bag attached to the catheter. When the fluid has all been removed, your doctor will send you back to us for the removal of the catheter. If you are going home with the catheter in place, your doctor may order a home health nurse to come to your house and assist with the care of the catheter. Your doctor will most likely order antibiotic tablets for you to take while you have this tube. Home Care Instructions: You can resume your regular diet and medication regimen. Do not drink alcohol, drive, or make any important legal decisions in the next 24 hours. Do not lift anything heavier than a gallon of milk, or do anything strenuous for the next 24 hours. You should not shower for 24 hours. You should cover the tube with plastic wrap and tape to keep it dry when you shower. You can disconnect the drainage bag while showering. The home health nurse or the nurse who discharges from the hospital will teach you how to do this. Do not take a bath, swim or immerse yourself in water as long as you have this drainage tube. You should clean the tube and change the dressing every  48 hours and as needed. Keep the dressing clean and dry. Keep up with how much drainage you get from the tube and report this to your doctor on each visit. Call If:     You should call your Physician and/or the Radiology Nurse if you have any bleeding other than a small spot on your bandage.  Call if you have any signs of infection, fever, or increased pain at the site of the tube. Call if you should have leakage around the tube, an increased yellowing of the skin, increased pain in the abdomen, a change in the amount or appearance of the fluid, or if the tube gets pulled out some or all the way out. Follow-Up Instructions: Please see your ordering doctor as he/she has requested. To Reach Us: If you have any questions about your procedure, please call the Interventional Radiology department at [phone]. After business hours (5pm) and weekends, call the answering service at [phone] and ask for the Radiologist on call to be paged. Si tiene Preguntas acerca del procedimiento, por favor llame al departamento de Radiología Intervencional al [phone]. Después de horas de oficina (5 pm) y los fines de [place], llamar al Roddy Stafford al [phone] y pregunte por el Radiologo de [facility]. Interventional Radiology General Nurse Discharge    After general anesthesia or intravenous sedation, for 24 hours or while taking prescription Narcotics:  · Limit your activities  · Do not drive and operate hazardous machinery  · Do not make important personal or business decisions  · Do  not drink alcoholic beverages  · If you have not urinated within 8 hours after discharge, please contact your surgeon on call. * Please give a list of your current medications to your Primary Care Provider. * Please update this list whenever your medications are discontinued, doses are     changed, or new medications (including over-the-counter products) are added. * Please carry medication information at all times in case of emergency situations. These are general instructions for a healthy lifestyle:    No smoking/ No tobacco products/ Avoid exposure to second hand smoke  Surgeon General's Warning:  Quitting smoking now greatly reduces serious risk to your health.     Obesity, smoking, and sedentary lifestyle greatly increases your risk for illness  A healthy diet, regular physical exercise & weight monitoring are important for maintaining a healthy lifestyle    You may be retaining fluid if you have a history of heart failure or if you experience any of the following symptoms:  Weight gain of 3 pounds or more overnight or 5 pounds in a week, increased swelling in our hands or feet or shortness of breath while lying flat in bed. Please call your doctor as soon as you notice any of these symptoms; do not wait until your next office visit. Recognize signs and symptoms of STROKE:  F-face looks uneven    A-arms unable to move or move unevenly    S-speech slurred or non-existent    T-time-call 911 as soon as signs and symptoms begin-DO NOT go       Back to bed or wait to see if you get better-TIME IS BRAIN. Your feedback is greatly appreciated. Should you receive a survey in mail, please complete your survey so we can know how to better serve you.

## 2021-05-20 NOTE — PROGRESS NOTES
Patient walking to nurses station asking about discharge. This RN to room with discharge paperwork. Patient asked if he knew how to flush his drain. Patient states, \"I know how to flush my drain. \" Patient declines copy of AVS. Patient offered wheelchair to lobby at this time. Patient decline wheelchair. Patient did not wait for next scheduled appointment time to print. Patient will be called at a later date for reminder. Patient ambulatory off the unit at this time.

## 2021-05-21 ENCOUNTER — HOME CARE VISIT (OUTPATIENT)
Dept: SCHEDULING | Facility: HOME HEALTH | Age: 76
End: 2021-05-21
Payer: MEDICARE

## 2021-05-21 PROCEDURE — G0155 HHCP-SVS OF CSW,EA 15 MIN: HCPCS

## 2021-05-21 PROCEDURE — 3331090001 HH PPS REVENUE CREDIT

## 2021-05-21 PROCEDURE — 3331090002 HH PPS REVENUE DEBIT

## 2021-05-22 PROCEDURE — 3331090001 HH PPS REVENUE CREDIT

## 2021-05-22 PROCEDURE — 3331090002 HH PPS REVENUE DEBIT

## 2021-05-23 PROCEDURE — 3331090001 HH PPS REVENUE CREDIT

## 2021-05-23 PROCEDURE — 3331090002 HH PPS REVENUE DEBIT

## 2021-05-24 PROCEDURE — 3331090002 HH PPS REVENUE DEBIT

## 2021-05-24 PROCEDURE — 3331090001 HH PPS REVENUE CREDIT

## 2021-05-25 ENCOUNTER — APPOINTMENT (OUTPATIENT)
Dept: CT IMAGING | Age: 76
DRG: 698 | End: 2021-05-25
Attending: PHYSICIAN ASSISTANT
Payer: MEDICARE

## 2021-05-25 ENCOUNTER — HOME CARE VISIT (OUTPATIENT)
Dept: SCHEDULING | Facility: HOME HEALTH | Age: 76
End: 2021-05-25
Payer: MEDICARE

## 2021-05-25 ENCOUNTER — HOSPITAL ENCOUNTER (INPATIENT)
Age: 76
LOS: 6 days | Discharge: HOME HEALTH | DRG: 698 | End: 2021-05-31
Attending: EMERGENCY MEDICINE | Admitting: FAMILY MEDICINE
Payer: MEDICARE

## 2021-05-25 DIAGNOSIS — N32.1 COLOVESICAL FISTULA: ICD-10-CM

## 2021-05-25 DIAGNOSIS — R31.9 URINARY TRACT INFECTION WITH HEMATURIA, SITE UNSPECIFIED: Primary | ICD-10-CM

## 2021-05-25 DIAGNOSIS — K65.1 PELVIC ABSCESS IN MALE (HCC): ICD-10-CM

## 2021-05-25 DIAGNOSIS — Z85.048 HISTORY OF RECTAL CANCER: ICD-10-CM

## 2021-05-25 DIAGNOSIS — N13.30 BILATERAL HYDRONEPHROSIS: ICD-10-CM

## 2021-05-25 DIAGNOSIS — N20.1 URETERAL STONE: ICD-10-CM

## 2021-05-25 DIAGNOSIS — Z93.3 COLOSTOMY STATUS (HCC): ICD-10-CM

## 2021-05-25 DIAGNOSIS — Z43.3 COLOSTOMY CARE (HCC): ICD-10-CM

## 2021-05-25 DIAGNOSIS — K59.00 CONSTIPATION, UNSPECIFIED CONSTIPATION TYPE: ICD-10-CM

## 2021-05-25 DIAGNOSIS — N39.0 URINARY TRACT INFECTION WITH HEMATURIA, SITE UNSPECIFIED: Primary | ICD-10-CM

## 2021-05-25 LAB
ALBUMIN SERPL-MCNC: 3.2 G/DL (ref 3.2–4.6)
ALBUMIN/GLOB SERPL: 0.8 {RATIO} (ref 1.2–3.5)
ALP SERPL-CCNC: 74 U/L (ref 50–136)
ALT SERPL-CCNC: 25 U/L (ref 12–65)
ANION GAP SERPL CALC-SCNC: 6 MMOL/L (ref 7–16)
APPEARANCE UR: ABNORMAL
APPEARANCE UR: ABNORMAL
AST SERPL-CCNC: 28 U/L (ref 15–37)
BACTERIA URNS QL MICRO: ABNORMAL /HPF
BACTERIA URNS QL MICRO: ABNORMAL /HPF
BASOPHILS # BLD: 0 K/UL (ref 0–0.2)
BASOPHILS NFR BLD: 1 % (ref 0–2)
BILIRUB SERPL-MCNC: 0.4 MG/DL (ref 0.2–1.1)
BILIRUB UR QL: NEGATIVE
BILIRUB UR QL: NEGATIVE
BUN SERPL-MCNC: 24 MG/DL (ref 8–23)
CALCIUM SERPL-MCNC: 8.8 MG/DL (ref 8.3–10.4)
CASTS URNS QL MICRO: 0 /LPF
CASTS URNS QL MICRO: 0 /LPF
CHLORIDE SERPL-SCNC: 107 MMOL/L (ref 98–107)
CO2 SERPL-SCNC: 27 MMOL/L (ref 21–32)
COLOR UR: ABNORMAL
COLOR UR: YELLOW
CREAT FLD-MCNC: 31 MG/DL
CREAT SERPL-MCNC: 1.08 MG/DL (ref 0.8–1.5)
CRYSTALS URNS QL MICRO: 0 /LPF
CRYSTALS URNS QL MICRO: ABNORMAL /LPF
DIFFERENTIAL METHOD BLD: ABNORMAL
EOSINOPHIL # BLD: 0 K/UL (ref 0–0.8)
EOSINOPHIL NFR BLD: 1 % (ref 0.5–7.8)
EPI CELLS #/AREA URNS HPF: ABNORMAL /HPF
EPI CELLS #/AREA URNS HPF: ABNORMAL /HPF
ERYTHROCYTE [DISTWIDTH] IN BLOOD BY AUTOMATED COUNT: 14.3 % (ref 11.9–14.6)
GLOBULIN SER CALC-MCNC: 4 G/DL (ref 2.3–3.5)
GLUCOSE SERPL-MCNC: 81 MG/DL (ref 65–100)
GLUCOSE UR STRIP.AUTO-MCNC: NEGATIVE MG/DL
GLUCOSE UR STRIP.AUTO-MCNC: NEGATIVE MG/DL
HCT VFR BLD AUTO: 36.1 % (ref 41.1–50.3)
HGB BLD-MCNC: 11.5 G/DL (ref 13.6–17.2)
HGB UR QL STRIP: ABNORMAL
HGB UR QL STRIP: ABNORMAL
IMM GRANULOCYTES # BLD AUTO: 0 K/UL (ref 0–0.5)
IMM GRANULOCYTES NFR BLD AUTO: 1 % (ref 0–5)
KETONES UR QL STRIP.AUTO: NEGATIVE MG/DL
KETONES UR QL STRIP.AUTO: NEGATIVE MG/DL
LACTATE SERPL-SCNC: 0.7 MMOL/L (ref 0.4–2)
LEUKOCYTE ESTERASE UR QL STRIP.AUTO: ABNORMAL
LEUKOCYTE ESTERASE UR QL STRIP.AUTO: ABNORMAL
LYMPHOCYTES # BLD: 0.4 K/UL (ref 0.5–4.6)
LYMPHOCYTES NFR BLD: 10 % (ref 13–44)
MCH RBC QN AUTO: 27.9 PG (ref 26.1–32.9)
MCHC RBC AUTO-ENTMCNC: 31.9 G/DL (ref 31.4–35)
MCV RBC AUTO: 87.6 FL (ref 79.6–97.8)
MONOCYTES # BLD: 0.4 K/UL (ref 0.1–1.3)
MONOCYTES NFR BLD: 10 % (ref 4–12)
MUCOUS THREADS URNS QL MICRO: 0 /LPF
MUCOUS THREADS URNS QL MICRO: ABNORMAL /LPF
NEUTS SEG # BLD: 3.1 K/UL (ref 1.7–8.2)
NEUTS SEG NFR BLD: 78 % (ref 43–78)
NITRITE UR QL STRIP.AUTO: NEGATIVE
NITRITE UR QL STRIP.AUTO: POSITIVE
NRBC # BLD: 0 K/UL (ref 0–0.2)
OTHER OBSERVATIONS,UCOM: ABNORMAL
OTHER OBSERVATIONS,UCOM: ABNORMAL
PH UR STRIP: 6 [PH] (ref 5–9)
PH UR STRIP: 6 [PH] (ref 5–9)
PLATELET # BLD AUTO: 255 K/UL (ref 150–450)
PMV BLD AUTO: 9.3 FL (ref 9.4–12.3)
POTASSIUM SERPL-SCNC: 3.8 MMOL/L (ref 3.5–5.1)
PROCALCITONIN SERPL-MCNC: 0.09 NG/ML
PROT SERPL-MCNC: 7.2 G/DL (ref 6.3–8.2)
PROT UR STRIP-MCNC: 100 MG/DL
PROT UR STRIP-MCNC: ABNORMAL MG/DL
RBC # BLD AUTO: 4.12 M/UL (ref 4.23–5.6)
RBC #/AREA URNS HPF: >100 /HPF
RBC #/AREA URNS HPF: ABNORMAL /HPF
SODIUM SERPL-SCNC: 140 MMOL/L (ref 136–145)
SP GR UR REFRACTOMETRY: 1.01 (ref 1–1.02)
SP GR UR REFRACTOMETRY: 1.02 (ref 1–1.02)
SPECIMEN SOURCE FLD: NORMAL
UROBILINOGEN UR QL STRIP.AUTO: 0.2 EU/DL (ref 0.2–1)
UROBILINOGEN UR QL STRIP.AUTO: 0.2 EU/DL (ref 0.2–1)
WBC # BLD AUTO: 3.9 K/UL (ref 4.3–11.1)
WBC URNS QL MICRO: >100 /HPF
WBC URNS QL MICRO: >100 /HPF
YEAST URNS QL MICRO: ABNORMAL
YEAST URNS QL MICRO: ABNORMAL

## 2021-05-25 PROCEDURE — 83605 ASSAY OF LACTIC ACID: CPT

## 2021-05-25 PROCEDURE — 74011000636 HC RX REV CODE- 636: Performed by: EMERGENCY MEDICINE

## 2021-05-25 PROCEDURE — 85025 COMPLETE CBC W/AUTO DIFF WBC: CPT

## 2021-05-25 PROCEDURE — 82570 ASSAY OF URINE CREATININE: CPT

## 2021-05-25 PROCEDURE — 74011250637 HC RX REV CODE- 250/637: Performed by: FAMILY MEDICINE

## 2021-05-25 PROCEDURE — 3331090002 HH PPS REVENUE DEBIT

## 2021-05-25 PROCEDURE — 3331090001 HH PPS REVENUE CREDIT

## 2021-05-25 PROCEDURE — 81003 URINALYSIS AUTO W/O SCOPE: CPT

## 2021-05-25 PROCEDURE — 87106 FUNGI IDENTIFICATION YEAST: CPT

## 2021-05-25 PROCEDURE — 87086 URINE CULTURE/COLONY COUNT: CPT

## 2021-05-25 PROCEDURE — 65270000029 HC RM PRIVATE

## 2021-05-25 PROCEDURE — 74011000258 HC RX REV CODE- 258: Performed by: FAMILY MEDICINE

## 2021-05-25 PROCEDURE — 74011000636 HC RX REV CODE- 636: Performed by: PHYSICIAN ASSISTANT

## 2021-05-25 PROCEDURE — 99222 1ST HOSP IP/OBS MODERATE 55: CPT | Performed by: UROLOGY

## 2021-05-25 PROCEDURE — 74011250636 HC RX REV CODE- 250/636: Performed by: FAMILY MEDICINE

## 2021-05-25 PROCEDURE — 84145 PROCALCITONIN (PCT): CPT

## 2021-05-25 PROCEDURE — 74177 CT ABD & PELVIS W/CONTRAST: CPT

## 2021-05-25 PROCEDURE — 80053 COMPREHEN METABOLIC PANEL: CPT

## 2021-05-25 PROCEDURE — 99285 EMERGENCY DEPT VISIT HI MDM: CPT

## 2021-05-25 PROCEDURE — 74011000258 HC RX REV CODE- 258: Performed by: EMERGENCY MEDICINE

## 2021-05-25 PROCEDURE — 81015 MICROSCOPIC EXAM OF URINE: CPT

## 2021-05-25 PROCEDURE — 87040 BLOOD CULTURE FOR BACTERIA: CPT

## 2021-05-25 RX ORDER — ACETAMINOPHEN 325 MG/1
650 TABLET ORAL
Status: DISCONTINUED | OUTPATIENT
Start: 2021-05-25 | End: 2021-05-31 | Stop reason: HOSPADM

## 2021-05-25 RX ORDER — VANCOMYCIN HYDROCHLORIDE
1250 ONCE
Status: COMPLETED | OUTPATIENT
Start: 2021-05-25 | End: 2021-05-25

## 2021-05-25 RX ORDER — MESALAMINE 4 G/60ML
4 ENEMA RECTAL 2 TIMES DAILY
Status: DISCONTINUED | OUTPATIENT
Start: 2021-05-25 | End: 2021-05-25

## 2021-05-25 RX ORDER — TAMSULOSIN HYDROCHLORIDE 0.4 MG/1
0.4 CAPSULE ORAL DAILY
Status: DISCONTINUED | OUTPATIENT
Start: 2021-05-26 | End: 2021-05-31 | Stop reason: HOSPADM

## 2021-05-25 RX ORDER — SODIUM CHLORIDE 9 MG/ML
75 INJECTION, SOLUTION INTRAVENOUS CONTINUOUS
Status: DISCONTINUED | OUTPATIENT
Start: 2021-05-25 | End: 2021-05-31 | Stop reason: HOSPADM

## 2021-05-25 RX ORDER — MELATONIN
2000 DAILY
Status: DISCONTINUED | OUTPATIENT
Start: 2021-05-26 | End: 2021-05-31 | Stop reason: HOSPADM

## 2021-05-25 RX ORDER — MESALAMINE 1000 MG/1
1 SUPPOSITORY RECTAL 2 TIMES DAILY
Status: DISCONTINUED | OUTPATIENT
Start: 2021-05-25 | End: 2021-05-29

## 2021-05-25 RX ORDER — AMITRIPTYLINE HYDROCHLORIDE 50 MG/1
100 TABLET, FILM COATED ORAL
Status: DISCONTINUED | OUTPATIENT
Start: 2021-05-25 | End: 2021-05-31 | Stop reason: HOSPADM

## 2021-05-25 RX ORDER — ACETAMINOPHEN 650 MG/1
650 SUPPOSITORY RECTAL
Status: DISCONTINUED | OUTPATIENT
Start: 2021-05-25 | End: 2021-05-31 | Stop reason: HOSPADM

## 2021-05-25 RX ORDER — ONDANSETRON 2 MG/ML
4 INJECTION INTRAMUSCULAR; INTRAVENOUS
Status: DISCONTINUED | OUTPATIENT
Start: 2021-05-25 | End: 2021-05-31 | Stop reason: HOSPADM

## 2021-05-25 RX ORDER — CLONAZEPAM 1 MG/1
4 TABLET ORAL
Status: DISCONTINUED | OUTPATIENT
Start: 2021-05-25 | End: 2021-05-25

## 2021-05-25 RX ORDER — SODIUM CHLORIDE 0.9 % (FLUSH) 0.9 %
10 SYRINGE (ML) INJECTION
Status: COMPLETED | OUTPATIENT
Start: 2021-05-25 | End: 2021-05-25

## 2021-05-25 RX ORDER — CLONAZEPAM 1 MG/1
6 TABLET ORAL
Status: DISCONTINUED | OUTPATIENT
Start: 2021-05-26 | End: 2021-05-31 | Stop reason: HOSPADM

## 2021-05-25 RX ORDER — POLYETHYLENE GLYCOL 3350 17 G/17G
17 POWDER, FOR SOLUTION ORAL DAILY PRN
Status: DISCONTINUED | OUTPATIENT
Start: 2021-05-25 | End: 2021-05-26

## 2021-05-25 RX ADMIN — Medication 10 ML: at 10:33

## 2021-05-25 RX ADMIN — IOPAMIDOL 100 ML: 755 INJECTION, SOLUTION INTRAVENOUS at 10:33

## 2021-05-25 RX ADMIN — SODIUM CHLORIDE 100 ML: 900 INJECTION, SOLUTION INTRAVENOUS at 10:33

## 2021-05-25 RX ADMIN — PIPERACILLIN SODIUM AND TAZOBACTAM SODIUM 3.38 G: 3; .375 INJECTION, POWDER, LYOPHILIZED, FOR SOLUTION INTRAVENOUS at 23:44

## 2021-05-25 RX ADMIN — OLANZAPINE 7.5 MG: 5 TABLET, FILM COATED ORAL at 22:58

## 2021-05-25 RX ADMIN — DIATRIZOATE MEGLUMINE AND DIATRIZOATE SODIUM 15 ML: 660; 100 LIQUID ORAL; RECTAL at 09:09

## 2021-05-25 RX ADMIN — CLONAZEPAM 4 MG: 1 TABLET ORAL at 22:57

## 2021-05-25 RX ADMIN — PIPERACILLIN SODIUM AND TAZOBACTAM SODIUM 3.38 G: 3; .375 INJECTION, POWDER, LYOPHILIZED, FOR SOLUTION INTRAVENOUS at 16:26

## 2021-05-25 RX ADMIN — MESALAMINE 1000 MG: 1000 SUPPOSITORY RECTAL at 23:37

## 2021-05-25 RX ADMIN — AMITRIPTYLINE HYDROCHLORIDE 100 MG: 50 TABLET, FILM COATED ORAL at 22:58

## 2021-05-25 RX ADMIN — SODIUM CHLORIDE 75 ML/HR: 900 INJECTION, SOLUTION INTRAVENOUS at 16:26

## 2021-05-25 RX ADMIN — VANCOMYCIN HYDROCHLORIDE 1250 MG: 10 INJECTION, POWDER, LYOPHILIZED, FOR SOLUTION INTRAVENOUS at 18:12

## 2021-05-25 NOTE — H&P
Hospitalist Admission History and Physical     NAME:  Giovana Marroquin   Age:  66 y.o.  :   1945   MRN:   [id number]  PCP: Jeremiah Phoenix  Consulting MD:  Treatment Team: Attending Provider: Candice Olea DO; Physician Assistant: BARBRA Man; Primary Nurse: Wong Brown RN; Consulting Provider: Radha Hemphill MD    Chief Complaint   Patient presents with    Urinary Catheter Problem         HPI:   Patient is a 66 y.o. male who presented to the ED for cc stool coming out of his Bishop bag and pre sacral drain. Hx of rectal cancer, colostomy and removal of sigmoid colon, anxiety, depression, hypothyroidism, diversion proctitis, multiple abdominal surgeries, and recent DC from our facility on 5/15 after he was diagnosed with small bowel obstruction and right pre-sacral tumor. Patient also had intrapelvic abscess which was drained by IR. Was on ceftriaxone and Flagyl as inpatient and switched to Ciprofloxacin, Flagyl to complete 2-week course of antibiotics. Urine cultures at that time were not final. Urine culture growing e coli and enterococcus faecalis. Vitals - stable    Labs- WBC 3.9    CT scan showed - Developing retrovesicular prostatic fluid collection containing punctate air concerning for abscess. Moderate distention of bladder with Bishop balloon in place. Presacral drain in place with previous sigmoid resection. 4. Increasing dilatation right renal collecting system with layering stones in the right lower renal pole prominent extrarenal pelvis as well as ureteral dilatation and questionable right UVJ stone.   Past Medical History:   Diagnosis Date    Acute deep vein thrombosis (DVT) of non-extremity vein 2019    Family history of malignant neoplasm of gastrointestinal tract     mother colon/ovarian cancer 67    Fecal incontinence     LAR syndrome    Former cigarette smoker     History of rectal cancer     Hypothyroid     stable w/med    Infection and inflammatory reaction due to other internal prosthetic devices, implants and grafts, subsequent encounter 2017    abd wound/mesh colostomy    Insomnia     takes meds    Major depression     Osteoarthritis, hand     Personal history of colonic polyps 9/2013    x 1    Personal history of malignant neoplasm of rectum, rectosigmoid junction, and anus 9/2013    Psychiatric disorder     anxiety        Past Surgical History:   Procedure Laterality Date    COLONOSCOPY N/A 2/12/2018    COLONOSCOPY / BMI=21 performed by Freda Lou MD at [facility] N/A 3/5/2021    COLONOSCOPY/BMI 19 performed by Sandra Ramos MD at [address]  2/12/2018         COLONOSCOPY THRU STOMA,LILLIAN RMVL W/SNARE  3/5/2021         ENDOSCOPY, COLON, DIAGNOSTIC  last 2/3/15    Emily--no polyps--3 year recall    HX COLOSTOMY  5/11/16    HX GI  4/2016    Sacral nerve stimlator lead trial (unsucessful) and removal    HX HERNIA REPAIR  3/2015, 5/2016    HX HERNIA REPAIR      and Colostomy in May    HX OTHER SURGICAL  10/7/2013    Emily---endorectal us    HX OTHER SURGICAL Bilateral     cataracts  2017    HX POLYPECTOMY      HX TOTAL COLECTOMY  11/2013    Emily--lap LAR with coloproctostomy, mobilization splenic flexure, diverting loop ileostomy    HX TOTAL COLECTOMY Right 8/2014    for leak    HX VASCULAR ACCESS Left 4/14    single lumen port/subsequently removed    IR INJ ABS CYST VIA CATHETER / TUBE  5/20/2021    IR INSERT TUNL CVC W/O PORT OVER 5 YR  5/20/2019    IR REPLACE CVC TUNNELED W/O PORT  5/30/2019        Family History   Problem Relation Age of Onset    Cancer Mother         colon and ovarian    Cancer Brother         prostate    Alcohol abuse Brother     Cancer Maternal Grandmother         bone    Alcohol abuse Father     Alcohol abuse Sister     Stroke Paternal Grandfather        Social History     Social History Narrative    Not on file Social History     Tobacco Use    Smoking status: Former Smoker     Packs/day: 1.00     Years: 4.00     Pack years: 4.00     Types: Cigarettes     Quit date: 1963     Years since quittin.5    Smokeless tobacco: Never Used   Substance Use Topics    Alcohol use: Not Currently     Alcohol/week: 11.7 standard drinks     Types: 14 Cans of beer per week     Comment: advised stop drinking given issues        Social History     Substance and Sexual Activity   Drug Use No         No Known Allergies    Prior to Admission medications    Medication Sig Start Date End Date Taking? Authorizing Provider   amoxicillin-clavulanate (AUGMENTIN) 875-125 mg per tablet Take 1 Tab by mouth every twelve (12) hours for 10 days. 21  Soledad Prado MD   tamsulosin (FLOMAX) 0.4 mg capsule Take 1 Cap by mouth daily for 30 days. 5/16/21 6/15/21  Soledad Prado MD   Saccharomyces boulardii (Florastor) 250 mg capsule Take 2 Caps by mouth two (2) times a day for 10 days. 21  Soledad Prado MD   mesalamine (ROWASA) 4 gram/60 mL enema Insert 60 mL into rectum every other day. Indications: diversion proctitis  Patient taking differently: Insert 60 mL into rectum two (2) times a day. Indications: diversion proctitis 3/5/21   Parviz Aguilar MD   levothyroxine (SYNTHROID) 125 mcg tablet TAKE ONE TABLET BY MOUTH ONE TIME DAILY BEFORE BREAKFAST 12/10/20   Roger Vargas DO   clonazePAM (KLONOPIN) 2 mg tablet Take 2 Tabs by mouth nightly. Max Daily Amount: 4 mg. Indications: takes for sleep  Patient taking differently: Take 2 Tabs by mouth nightly. 3 tabs by mouth at night for sleep  Indications: takes for sleep 19   Amadeo Tyler MD   amitriptyline (ELAVIL) 100 mg tablet Take 1 Tab by mouth nightly. Dr Goss Pop psych  Indications: takes for sleep 19   Amadeo Tyler MD   OLANZapine THE PAVILIION) 2.5 mg tablet Take 7.5 mg by mouth nightly.  Indications: Takes 3 x 2.5 mg tabs    Provider, Historical Cholecalciferol, Vitamin D3, (VITAMIN D3) 2,000 unit cap capsule Take 2,000 Units by mouth two (2) times a day. 10/15/18   Camille Vanegas,            Review of Systems    Constitutional: anxious   Eyes:  no change in visual acuity, no photophobia  Ears, nose, mouth, throat, and face: no  Odynphagia, dysphagia, no thrush or exudate, negative for chronic sinus congestion, recurrent headaches  Respiratory: negative for SOB, hemoptysis or cough  Cardiovascular: negative for CP, palpitations, or PND  Gastrointestinal: stool out of brito and pre sacral drain  Genitourinary: no urgency, frequency, or dysuria, no nocturia  Integument/breast: negative for skin rash or skin lesions  Hematologic/lymphatic: negative for known bleeding disorder  Musculoskeletal:negative for joint pain or joint tenderness  Neurological: negative for lightheadedness, syncope or presyncopal events, no seizure or CVA history  Behavioral/Psych: negative for depression or chronic anxiety,   Endocrine: negative for polydyspia, polyuria or intolerance to heat or cold  Allergic/Immunologic: negative for chronic allergic rhinitis, or known connective tissue disorder      Objective:     Visit Vitals  BP (!) 149/85   Pulse 80   Temp 98.7 °F (37.1 °C)   Resp 16   Ht 5' 8\" (1.727 m)   Wt 52.2 kg (115 lb)   SpO2 99%   BMI 17.49 kg/m²        05/25 0701 - 05/25 1900  In: 100 [I.V.:100]  Out: -   No intake/output data recorded.     Data Review:   Recent Results (from the past 24 hour(s))   CBC WITH AUTOMATED DIFF    Collection Time: 05/25/21  7:36 AM   Result Value Ref Range    WBC 3.9 (L) 4.3 - 11.1 K/uL    RBC 4.12 (L) 4.23 - 5.6 M/uL    HGB 11.5 (L) 13.6 - 17.2 g/dL    HCT 36.1 (L) 41.1 - 50.3 %    MCV 87.6 79.6 - 97.8 FL    MCH 27.9 26.1 - 32.9 PG    MCHC 31.9 31.4 - 35.0 g/dL    RDW 14.3 11.9 - 14.6 %    PLATELET 094 874 - 438 K/uL    MPV 9.3 (L) 9.4 - 12.3 FL    ABSOLUTE NRBC 0.00 0.0 - 0.2 K/uL    DF AUTOMATED      NEUTROPHILS 78 43 - 78 % LYMPHOCYTES 10 (L) 13 - 44 %    MONOCYTES 10 4.0 - 12.0 %    EOSINOPHILS 1 0.5 - 7.8 %    BASOPHILS 1 0.0 - 2.0 %    IMMATURE GRANULOCYTES 1 0.0 - 5.0 %    ABS. NEUTROPHILS 3.1 1.7 - 8.2 K/UL    ABS. LYMPHOCYTES 0.4 (L) 0.5 - 4.6 K/UL    ABS. MONOCYTES 0.4 0.1 - 1.3 K/UL    ABS. EOSINOPHILS 0.0 0.0 - 0.8 K/UL    ABS. BASOPHILS 0.0 0.0 - 0.2 K/UL    ABS. IMM. GRANS. 0.0 0.0 - 0.5 K/UL   METABOLIC PANEL, COMPREHENSIVE    Collection Time: 05/25/21  7:36 AM   Result Value Ref Range    Sodium 140 136 - 145 mmol/L    Potassium 3.8 3.5 - 5.1 mmol/L    Chloride 107 98 - 107 mmol/L    CO2 27 21 - 32 mmol/L    Anion gap 6 (L) 7 - 16 mmol/L    Glucose 81 65 - 100 mg/dL    BUN 24 (H) 8 - 23 MG/DL    Creatinine 1.08 0.8 - 1.5 MG/DL    GFR est AA >60 >60 ml/min/1.73m2    GFR est non-AA >60 >60 ml/min/1.73m2    Calcium 8.8 8.3 - 10.4 MG/DL    Bilirubin, total 0.4 0.2 - 1.1 MG/DL    ALT (SGPT) 25 12 - 65 U/L    AST (SGOT) 28 15 - 37 U/L    Alk.  phosphatase 74 50 - 136 U/L    Protein, total 7.2 6.3 - 8.2 g/dL    Albumin 3.2 3.2 - 4.6 g/dL    Globulin 4.0 (H) 2.3 - 3.5 g/dL    A-G Ratio 0.8 (L) 1.2 - 3.5     PROCALCITONIN    Collection Time: 05/25/21  7:36 AM   Result Value Ref Range    Procalcitonin 0.09 ng/mL   URINALYSIS W/ RFLX MICROSCOPIC    Collection Time: 05/25/21  8:20 AM   Result Value Ref Range    Color GREEN      Appearance TURBID      Specific gravity 1.020 1.001 - 1.023      pH (UA) 6.0 5.0 - 9.0      Protein 100 (A) NEG mg/dL    Glucose Negative mg/dL    Ketone Negative NEG mg/dL    Bilirubin Negative NEG      Blood LARGE (A) NEG      Urobilinogen 0.2 0.2 - 1.0 EU/dL    Nitrites Negative NEG      Leukocyte Esterase LARGE (A) NEG     URINE MICROSCOPIC    Collection Time: 05/25/21  8:20 AM   Result Value Ref Range    WBC >100 0 /hpf    RBC >100 0 /hpf    Epithelial cells 3-5 0 /hpf    Bacteria 2+ (H) 0 /hpf    Casts 0 0 /lpf    Crystals, urine CA OXALATE 0 /LPF    Mucus 0 0 /lpf    Yeast MARKED      Other observations RESULTS VERIFIED MANUALLY     URINALYSIS W/ RFLX MICROSCOPIC    Collection Time: 05/25/21  9:11 AM   Result Value Ref Range    Color YELLOW      Appearance TURBID      Specific gravity 1.010 1.001 - 1.023      pH (UA) 6.0 5.0 - 9.0      Protein TRACE (A) NEG mg/dL    Glucose Negative mg/dL    Ketone Negative NEG mg/dL    Bilirubin Negative NEG      Blood LARGE (A) NEG      Urobilinogen 0.2 0.2 - 1.0 EU/dL    Nitrites Positive (A) NEG      Leukocyte Esterase LARGE (A) NEG     URINE MICROSCOPIC    Collection Time: 05/25/21  9:11 AM   Result Value Ref Range    WBC >100 0 /hpf    RBC 20-50 0 /hpf    Epithelial cells 0-3 0 /hpf    Bacteria 2+ (H) 0 /hpf    Casts 0 0 /lpf    Crystals, urine 0 0 /LPF    Mucus 1+ (H) 0 /lpf    Yeast MODERATE      Other observations RESULTS VERIFIED MANUALLY     LACTIC ACID    Collection Time: 05/25/21  9:14 AM   Result Value Ref Range    Lactic acid 0.7 0.4 - 2.0 MMOL/L   CREATININE, FLUID    Collection Time: 05/25/21  1:28 PM   Result Value Ref Range    Fluid Type: COLOSTOMY DRAINAGE      Creatinine, fluid 31.00 MG/DL       Physical Exam:     General:  Alert, cooperative, anxious    Eyes:  Conjunctivae/corneas clear. Ears:  Normal TMs and external ear canals both ears. Nose: Nares normal. Septum midline. Mouth/Throat: Lips, mucosa, and tongue normal. Teeth and gums normal.   Neck:  no JVD. Back:   Symmetric, no curvature. ROM normal. No CVA tenderness. Lungs:   Clear to auscultation bilaterally. Heart:  Regular rate and rhythm, S1, S2 normal, no murmur, click, rub or gallop. Abdomen:   Soft, non-tender. Bowel sounds normal. Colostomy bag with good output. Bishop with slight brown color. Dark brown liquid in pre sacral drain. Extremities: Extremities normal, atraumatic, no cyanosis or edema. Pulses: 2+ and symmetric all extremities.    Skin: Skin color, texture, turgor normal. No rashes or lesions   Lymph nodes: Cervical, supraclavicular, and axillary nodes normal. Neurologic: CNII-XII intact. Normal strength, sensation and reflexes throughout. Assessment and Plan     Principal Problem:    Pelvic abscess in male [facility]) (5/13/2021)    Active Problems:    Rectal cancer ([address] (10/22/2013)      Hypothyroid (6/14/2019)      Overview: stable w/med      Major depression ()      Prostatic fluid collection with punctate air concerning for abscess - Consult IR for potential intervention. Distention of bladder with Bishop in proper place with increasing dilatation to right renal collecting system with stones - Urology to evaluate. Flomax. Pre sacaral abscess s/p drain that now has stool drainage. - Spoken to radiology. No obvious fistula per radiology review but he does recommend CT cystogram through Bishop to see if leaking into bowel if urology agrees. IR to review as well since recently placed Bishop. I am concerned of fistula to cause new drainage. Place on Vancomycin and Zosyn. Anxiety - amitriptyline, klonopin. Olanzapine. Synthroid    NPO until we know if needs surgical intervention. Wishes to be DNR    Update@ 15:57- fluid creatine 31 from colostomy. Has fistula. Will consult general surgery.      DVT prophylaxis -  SCDs  Signed By: Renu Toth DO   May 25, 2021

## 2021-05-25 NOTE — ED PROVIDER NOTES
Patient is here with urinary retention that started yesterday morning. He called urology as he had no output in his Bishop and within 5 or 10 minutes of calling them it was full of urine. Today he had the same problem so he came into the ED. There is a greenish liquid in there today. They had changed out his catheter for blockage on May 21. Patient was admitted May 11 for E. coli infection and urinary retention so he had a Bishop placed at that time. He also has a colostomy. The colostomy has yellow liquid in it today that actually looks like urine. Patient has had no fever, nausea, vomiting, abdominal pain, chest pain, shortness of breath, dizziness, weakness, dysuria or other new symptoms. He does live at home alone. On patient's admission May 11 he had a pelvic floor abscess that interventional radiology attempted to drain twice and was not completely successful. The history is provided by the patient. Urinary Retention   This is a new problem. The current episode started yesterday. The problem occurs constantly. The problem has not changed since onset. The pain is associated with an unknown factor. The pain is at a severity of 0/10. The patient is experiencing no pain. Pertinent negatives include no anorexia, no fever, no belching, no diarrhea, no flatus, no hematochezia, no melena, no nausea, no vomiting, no constipation, no dysuria, no frequency, no hematuria, no headaches, no arthralgias, no myalgias, no trauma, no chest pain, no testicular pain and no back pain. Nothing worsens the pain. The pain is relieved by nothing. His past medical history is significant for UTI.         Past Medical History:   Diagnosis Date    Acute deep vein thrombosis (DVT) of non-extremity vein 5/20/2019    Family history of malignant neoplasm of gastrointestinal tract     mother colon/ovarian cancer 67    Fecal incontinence     LAR syndrome    Former cigarette smoker     History of rectal cancer     Hypothyroid stable w/med    Infection and inflammatory reaction due to other internal prosthetic devices, implants and grafts, subsequent encounter 2017    abd wound/mesh colostomy    Insomnia     takes meds    Major depression     Osteoarthritis, hand     Personal history of colonic polyps 9/2013    x 1    Personal history of malignant neoplasm of rectum, rectosigmoid junction, and anus 9/2013    Psychiatric disorder     anxiety       Past Surgical History:   Procedure Laterality Date    COLONOSCOPY N/A 2/12/2018    COLONOSCOPY / BMI=21 performed by Mick Mcknight MD at [facility] N/A 3/5/2021    COLONOSCOPY/BMI 19 performed by Osvaldo Yuen MD at [address] [place] [address]  2/12/2018         COLONOSCOPY THRU STOMA,LILLIAN RMVL W/SNARE  3/5/2021         ENDOSCOPY, COLON, DIAGNOSTIC  last 2/3/15    Emily--no polyps--3 year recall    HX COLOSTOMY  5/11/16    HX GI  4/2016    Sacral nerve stimlator lead trial (unsucessful) and removal    HX HERNIA REPAIR  3/2015, 5/2016    HX HERNIA REPAIR      and Colostomy in May    HX OTHER SURGICAL  10/7/2013    Emily---endorectal us    HX OTHER SURGICAL Bilateral     cataracts  2017    HX POLYPECTOMY      HX TOTAL COLECTOMY  11/2013    Emily--lap LAR with coloproctostomy, mobilization splenic flexure, diverting loop ileostomy    HX TOTAL COLECTOMY Right 8/2014    for leak    HX VASCULAR ACCESS Left 4/14    single lumen port/subsequently removed    IR INJ ABS CYST VIA CATHETER / TUBE  5/20/2021    IR INSERT TUNL CVC W/O PORT OVER 5 YR  5/20/2019    IR REPLACE CVC TUNNELED W/O PORT  5/30/2019         Family History:   Problem Relation Age of Onset    Cancer Mother         colon and ovarian    Cancer Brother         prostate    Alcohol abuse Brother     Cancer Maternal Grandmother         bone    Alcohol abuse Father     Alcohol abuse Sister     Stroke Paternal Grandfather        Social History     Socioeconomic History    Marital status: SINGLE     Spouse name: Not on file    Number of children: Not on file    Years of education: Not on file    Highest education level: Not on file   Occupational History    Not on file   Tobacco Use    Smoking status: Former Smoker     Packs/day: 1.00     Years: 4.00     Pack years: 4.00     Types: Cigarettes     Quit date: 1963     Years since quittin.5    Smokeless tobacco: Never Used   Substance and Sexual Activity    Alcohol use: Not Currently     Alcohol/week: 11.7 standard drinks     Types: 14 Cans of beer per week     Comment: advised stop drinking given issues    Drug use: No    Sexual activity: Never   Other Topics Concern    Not on file   Social History Narrative    Not on file     Social Determinants of Health     Financial Resource Strain:     Difficulty of Paying Living Expenses:    Food Insecurity:     Worried About Running Out of Food in the Last Year:     [address] in the Last Year:    Transportation Needs:     Lack of Transportation (Medical):  Lack of Transportation (Non-Medical):    Physical Activity:     Days of Exercise per Week:     Minutes of Exercise per Session:    Stress:     Feeling of Stress :    Social Connections:     Frequency of Communication with Friends and Family:     Frequency of Social Gatherings with Friends and Family:     Attends [employer] Services:     Active Member of Clubs or Organizations:     Attends Club or Organization Meetings:     Marital Status:    Intimate Partner Violence:     Fear of Current or Ex-Partner:     Emotionally Abused:     Physically Abused:     Sexually Abused: ALLERGIES: Patient has no known allergies. Review of Systems   Constitutional: Negative. Negative for fever. HENT: Negative. Eyes: Negative. Respiratory: Negative. Cardiovascular: Negative. Negative for chest pain. Gastrointestinal: Negative.   Negative for anorexia, constipation, diarrhea, flatus, hematochezia, melena, nausea and vomiting. Genitourinary: Positive for decreased urine volume. Negative for dysuria, frequency, hematuria and testicular pain. Musculoskeletal: Negative. Negative for arthralgias, back pain and myalgias. Skin: Negative. Neurological: Negative. Negative for headaches. Psychiatric/Behavioral: Negative. All other systems reviewed and are negative. Vitals:    05/25/21 0730 05/25/21 0816   BP: 137/84    Pulse: 76    Resp: 16    Temp: 98.7 °F (37.1 °C)    SpO2: 99% 100%   Weight: 52.2 kg (115 lb)    Height: 5' 8\" (1.727 m)             Physical Exam  Vitals and nursing note reviewed. Exam conducted with a chaperone present. Constitutional:       Appearance: Normal appearance. He is well-developed. HENT:      Head: Normocephalic and atraumatic. Right Ear: External ear normal.      Left Ear: External ear normal.      Nose: Nose normal.      Mouth/Throat:      Mouth: Mucous membranes are moist.   Eyes:      Conjunctiva/sclera: Conjunctivae normal.      Pupils: Pupils are equal, round, and reactive to light. Cardiovascular:      Rate and Rhythm: Normal rate and regular rhythm. Heart sounds: Normal heart sounds. Pulmonary:      Effort: Pulmonary effort is normal.      Breath sounds: Normal breath sounds. Abdominal:      General: Abdomen is flat. Bowel sounds are normal.      Palpations: Abdomen is soft. Genitourinary:     Penis: Normal.       Comments: Jeannette Valencia RN into chaperone exam.  Musculoskeletal:         General: Normal range of motion. Cervical back: Normal range of motion and neck supple. Skin:     General: Skin is warm and dry. Capillary Refill: Capillary refill takes less than 2 seconds. Neurological:      Mental Status: He is alert and oriented to person, place, and time. Mental status is at baseline. Deep Tendon Reflexes: Reflexes are normal and symmetric.    Psychiatric:         Mood and Affect: Mood normal. Behavior: Behavior normal.         Thought Content: Thought content normal.         Judgment: Judgment normal.          MDM  Number of Diagnoses or Management Options     Amount and/or Complexity of Data Reviewed  Clinical lab tests: ordered  Tests in the radiology section of CPT®: ordered  Discuss the patient with other providers: yes (Dr. Renae Carey)    Risk of Complications, Morbidity, and/or Mortality  Presenting problems: moderate  Diagnostic procedures: moderate  Management options: moderate           Procedures    8:45 AM Spoke with Dr. Renae Carey regarding patient. We reviewed his chart, history, physical exam and work up. Dr. Renae Carey will go see patient. 12:51 PM Spoke with Dayday Shields NP with urology, she is going to talk with Dr. Jud Reeves regarding patient. 2:30 PM Kanchan served Dayday Shields NP again regarding patient. 2:58 PM Per Dayday Shields NP, Dr. Neyda Hairston will see patient here in the ED. He is reviewing the CT scan now. They would like hospitalist to admit. Unknown about antibiotics at this time. 3:07 PM Dr. Bethel Perera, hospitalist perfect served for admission.

## 2021-05-25 NOTE — PROGRESS NOTES
Chart review complete, CM met with pt at bedside pt found sitting up in bedside chair alert and oriented x4, pt states he was admitted to the hospital and dc home on 5/11/2021 and dc on 5/17/2021, pt states he lives alone in [facility] with 1 step to enter and 13 steps inside home, states he is independent with ADLS and drives, states meds affordable with medications , no current DME in home for use. Pt is current with [facility] for nursing. Demographics, insurance and PCP confirmed. Pt made aware CM staff will remain available to assist with dc needs as needed, verbalized understanding. Care Management Interventions  PCP Verified by CM:  Yes  Transition of Care Consult (CM Consult): [address]: Yes  Discharge Durable Medical Equipment: No  Physical Therapy Consult: No  Occupational Therapy Consult: No  Speech Therapy Consult: No  Current Support Network: Own Home, Lives Alone  Confirm Follow Up Transport: Family  Discharge Location  Discharge Placement: Unable to determine at this time

## 2021-05-25 NOTE — PROGRESS NOTES
05/25/21 1730   Dual Skin Pressure Injury Assessment   Dual Skin Pressure Injury Assessment WDL   Second Care Provider (Based on [address] [place] [address]) Court Quintanilla, RN     Skin intact no pressure injuries noted. Pt does have LQ ostomy, brito, and DIANE drain to sacral, lower back region.

## 2021-05-25 NOTE — ED NOTES
TRANSFER - OUT REPORT:    Verbal report given to Mannie Morris RN on [address]  being transferred to Room #901 for routine progression of care       Report consisted of patients Situation, Background, Assessment and   Recommendations(SBAR). Information from the following report(s) SBAR, Kardex, ED Summary, Intake/Output, MAR and Recent Results was reviewed with the receiving nurse. Lines:   Peripheral IV 05/25/21 Left Antecubital (Active)   Site Assessment Clean, dry, & intact 05/25/21 0737   Phlebitis Assessment 0 05/25/21 0737   Infiltration Assessment 0 05/25/21 0737   Dressing Status Clean, dry, & intact 05/25/21 0737        Opportunity for questions and clarification was provided.       Patient transported with:   [place]

## 2021-05-25 NOTE — PROGRESS NOTES
Pharmacokinetic Consult to Pharmacist    Caresse Carrel is a 66 y.o. male being treated for intra-abdominal abscess with vancomycin. Height: 5' 8\" (172.7 cm)  Weight: 52.2 kg (115 lb)  Lab Results   Component Value Date/Time    BUN 24 (H) 05/25/2021 07:36 AM    Creatinine 1.08 05/25/2021 07:36 AM    WBC 3.9 (L) 05/25/2021 07:36 AM    Procalcitonin 0.09 05/25/2021 07:36 AM    Lactic acid 0.7 05/25/2021 09:14 AM    Lactic Acid (POC) 1.23 06/13/2019 04:07 PM      Estimated Creatinine Clearance: 42.3 mL/min (based on SCr of 1.08 mg/dL). CULTURES:  pending    Day 1 of vancomycin. Goal trough is 15-20. Vancomycin dose initiated at 1250 mg X 1, then 750 mg Q18H. Will continue to follow patient and order levels when clinically indicated.     Thank you,  Radha Moore, PharmD, [address]  Clinical Pharmacist  [phone]

## 2021-05-26 ENCOUNTER — HOME CARE VISIT (OUTPATIENT)
Dept: HOME HEALTH SERVICES | Facility: HOME HEALTH | Age: 76
End: 2021-05-26
Payer: MEDICARE

## 2021-05-26 ENCOUNTER — APPOINTMENT (OUTPATIENT)
Dept: CT IMAGING | Age: 76
DRG: 698 | End: 2021-05-26
Attending: UROLOGY
Payer: MEDICARE

## 2021-05-26 LAB
ANION GAP SERPL CALC-SCNC: 7 MMOL/L (ref 7–16)
BASOPHILS # BLD: 0 K/UL (ref 0–0.2)
BASOPHILS NFR BLD: 1 % (ref 0–2)
BUN SERPL-MCNC: 19 MG/DL (ref 8–23)
CALCIUM SERPL-MCNC: 8.5 MG/DL (ref 8.3–10.4)
CHLORIDE SERPL-SCNC: 110 MMOL/L (ref 98–107)
CO2 SERPL-SCNC: 26 MMOL/L (ref 21–32)
CREAT SERPL-MCNC: 1.01 MG/DL (ref 0.8–1.5)
DIFFERENTIAL METHOD BLD: ABNORMAL
EOSINOPHIL # BLD: 0.1 K/UL (ref 0–0.8)
EOSINOPHIL NFR BLD: 2 % (ref 0.5–7.8)
ERYTHROCYTE [DISTWIDTH] IN BLOOD BY AUTOMATED COUNT: 14.4 % (ref 11.9–14.6)
GLUCOSE SERPL-MCNC: 59 MG/DL (ref 65–100)
HCT VFR BLD AUTO: 32.5 % (ref 41.1–50.3)
HGB BLD-MCNC: 10.2 G/DL (ref 13.6–17.2)
IMM GRANULOCYTES # BLD AUTO: 0 K/UL (ref 0–0.5)
IMM GRANULOCYTES NFR BLD AUTO: 0 % (ref 0–5)
LYMPHOCYTES # BLD: 0.4 K/UL (ref 0.5–4.6)
LYMPHOCYTES NFR BLD: 11 % (ref 13–44)
MCH RBC QN AUTO: 27.9 PG (ref 26.1–32.9)
MCHC RBC AUTO-ENTMCNC: 31.4 G/DL (ref 31.4–35)
MCV RBC AUTO: 88.8 FL (ref 79.6–97.8)
MONOCYTES # BLD: 0.4 K/UL (ref 0.1–1.3)
MONOCYTES NFR BLD: 11 % (ref 4–12)
NEUTS SEG # BLD: 2.6 K/UL (ref 1.7–8.2)
NEUTS SEG NFR BLD: 76 % (ref 43–78)
NRBC # BLD: 0 K/UL (ref 0–0.2)
PLATELET # BLD AUTO: 228 K/UL (ref 150–450)
PMV BLD AUTO: 9.3 FL (ref 9.4–12.3)
POTASSIUM SERPL-SCNC: 3.8 MMOL/L (ref 3.5–5.1)
RBC # BLD AUTO: 3.66 M/UL (ref 4.23–5.6)
SODIUM SERPL-SCNC: 143 MMOL/L (ref 136–145)
WBC # BLD AUTO: 3.4 K/UL (ref 4.3–11.1)

## 2021-05-26 PROCEDURE — 74011250637 HC RX REV CODE- 250/637: Performed by: FAMILY MEDICINE

## 2021-05-26 PROCEDURE — 3331090002 HH PPS REVENUE DEBIT

## 2021-05-26 PROCEDURE — 3331090001 HH PPS REVENUE CREDIT

## 2021-05-26 PROCEDURE — 72192 CT PELVIS W/O DYE: CPT

## 2021-05-26 PROCEDURE — 36415 COLL VENOUS BLD VENIPUNCTURE: CPT

## 2021-05-26 PROCEDURE — 74011000258 HC RX REV CODE- 258: Performed by: FAMILY MEDICINE

## 2021-05-26 PROCEDURE — 74011250636 HC RX REV CODE- 250/636: Performed by: FAMILY MEDICINE

## 2021-05-26 PROCEDURE — 85025 COMPLETE CBC W/AUTO DIFF WBC: CPT

## 2021-05-26 PROCEDURE — APPNB15 APP NON BILLABLE TIME 0-15 MINS: Performed by: NURSE PRACTITIONER

## 2021-05-26 PROCEDURE — 99223 1ST HOSP IP/OBS HIGH 75: CPT | Performed by: SURGERY

## 2021-05-26 PROCEDURE — 65270000029 HC RM PRIVATE

## 2021-05-26 PROCEDURE — 80048 BASIC METABOLIC PNL TOTAL CA: CPT

## 2021-05-26 RX ORDER — POLYETHYLENE GLYCOL 3350 17 G/17G
51 POWDER, FOR SOLUTION ORAL ONCE
Status: ACTIVE | OUTPATIENT
Start: 2021-05-27 | End: 2021-05-27

## 2021-05-26 RX ORDER — POLYETHYLENE GLYCOL 3350 17 G/17G
17 POWDER, FOR SOLUTION ORAL DAILY
Status: DISCONTINUED | OUTPATIENT
Start: 2021-05-27 | End: 2021-05-31 | Stop reason: HOSPADM

## 2021-05-26 RX ADMIN — AMITRIPTYLINE HYDROCHLORIDE 100 MG: 50 TABLET, FILM COATED ORAL at 22:40

## 2021-05-26 RX ADMIN — VITAMIN D, TAB 1000IU (100/BT) 2000 UNITS: 25 TAB at 09:08

## 2021-05-26 RX ADMIN — TAMSULOSIN HYDROCHLORIDE 0.4 MG: 0.4 CAPSULE ORAL at 09:07

## 2021-05-26 RX ADMIN — CLONAZEPAM 6 MG: 1 TABLET ORAL at 22:40

## 2021-05-26 RX ADMIN — LEVOTHYROXINE SODIUM 125 MCG: 0.07 TABLET ORAL at 05:45

## 2021-05-26 RX ADMIN — ACETAMINOPHEN 650 MG: 325 TABLET ORAL at 09:07

## 2021-05-26 RX ADMIN — PIPERACILLIN SODIUM AND TAZOBACTAM SODIUM 3.38 G: 3; .375 INJECTION, POWDER, LYOPHILIZED, FOR SOLUTION INTRAVENOUS at 16:43

## 2021-05-26 RX ADMIN — SODIUM CHLORIDE 75 ML/HR: 900 INJECTION, SOLUTION INTRAVENOUS at 12:24

## 2021-05-26 RX ADMIN — OLANZAPINE 7.5 MG: 5 TABLET, FILM COATED ORAL at 22:40

## 2021-05-26 RX ADMIN — PIPERACILLIN SODIUM AND TAZOBACTAM SODIUM 3.38 G: 3; .375 INJECTION, POWDER, LYOPHILIZED, FOR SOLUTION INTRAVENOUS at 08:55

## 2021-05-26 RX ADMIN — MESALAMINE 1000 MG: 1000 SUPPOSITORY RECTAL at 17:49

## 2021-05-26 NOTE — PROGRESS NOTES
Patient seen and examined and discussed with Dr. Srinivas Vela  Essentially he is s/p LAR in 2013 for rectal cancer with recurrence of presacral tumor and is s/p IR drain for pelvic abscess. He presented with concern for fistula. He is stable. Urology following.      Full consult note to follow    Hugh Andrea NP

## 2021-05-26 NOTE — PROGRESS NOTES
Admit Date: 5/25/2021    Subjective:     Caresse Carrel is currently out of room. Objective:     Patient Vitals for the past 8 hrs:   BP Temp Pulse Resp SpO2   05/26/21 0850 121/77 97.9 °F (36.6 °C) 87 18 98 %     No intake/output data recorded.   05/24 1901 - 05/26 0700  In: 1132 [I.V.:1132]  Out: -     Physical Exam:      Data Review   Recent Results (from the past 24 hour(s))   CREATININE, FLUID    Collection Time: 05/25/21  1:28 PM   Result Value Ref Range    Fluid Type: COLOSTOMY DRAINAGE      Creatinine, fluid 31.00 MG/DL   CULTURE, BLOOD    Collection Time: 05/25/21  3:32 PM    Specimen: Blood   Result Value Ref Range    Special Requests: RIGHT  ARM        Culture result: NO GROWTH AFTER 15 HOURS     CULTURE, BLOOD    Collection Time: 05/25/21  3:32 PM    Specimen: Blood   Result Value Ref Range    Special Requests: LEFT  ARM        Culture result: NO GROWTH AFTER 15 HOURS     METABOLIC PANEL, BASIC    Collection Time: 05/26/21  4:22 AM   Result Value Ref Range    Sodium 143 136 - 145 mmol/L    Potassium 3.8 3.5 - 5.1 mmol/L    Chloride 110 (H) 98 - 107 mmol/L    CO2 26 21 - 32 mmol/L    Anion gap 7 7 - 16 mmol/L    Glucose 59 (L) 65 - 100 mg/dL    BUN 19 8 - 23 MG/DL    Creatinine 1.01 0.8 - 1.5 MG/DL    GFR est AA >60 >60 ml/min/1.73m2    GFR est non-AA >60 >60 ml/min/1.73m2    Calcium 8.5 8.3 - 10.4 MG/DL   CBC WITH AUTOMATED DIFF    Collection Time: 05/26/21  4:22 AM   Result Value Ref Range    WBC 3.4 (L) 4.3 - 11.1 K/uL    RBC 3.66 (L) 4.23 - 5.6 M/uL    HGB 10.2 (L) 13.6 - 17.2 g/dL    HCT 32.5 (L) 41.1 - 50.3 %    MCV 88.8 79.6 - 97.8 FL    MCH 27.9 26.1 - 32.9 PG    MCHC 31.4 31.4 - 35.0 g/dL    RDW 14.4 11.9 - 14.6 %    PLATELET 269 611 - 077 K/uL    MPV 9.3 (L) 9.4 - 12.3 FL    ABSOLUTE NRBC 0.00 0.0 - 0.2 K/uL    DF AUTOMATED      NEUTROPHILS 76 43 - 78 %    LYMPHOCYTES 11 (L) 13 - 44 %    MONOCYTES 11 4.0 - 12.0 %    EOSINOPHILS 2 0.5 - 7.8 %    BASOPHILS 1 0.0 - 2.0 %    IMMATURE GRANULOCYTES 0 0.0 - 5.0 %    ABS. NEUTROPHILS 2.6 1.7 - 8.2 K/UL    ABS. LYMPHOCYTES 0.4 (L) 0.5 - 4.6 K/UL    ABS. MONOCYTES 0.4 0.1 - 1.3 K/UL    ABS. EOSINOPHILS 0.1 0.0 - 0.8 K/UL    ABS. BASOPHILS 0.0 0.0 - 0.2 K/UL    ABS. IMM. GRANS. 0.0 0.0 - 0.5 K/UL       Assessment:     Principal Problem:    Pelvic abscess in male [facility]) (5/13/2021)    Active Problems:    Rectal cancer ([facility] [address] (10/22/2013)      Hypothyroid (6/14/2019)      Overview: stable w/med      Major depression ()      ASSESSMENT: 66 y.o.  male with possible small distal R ureteral stone, recurrent pelvic abscess and possible colovesical fistula. Fluid Creatinine from colostomy consistent with urine    VSS, afebrile, WBC 3.4, Hgb 10.2, Cn 1.01. Urine culture- >100 K yeast    Plan:     -CT cystogram ordered to further evaluate for possible fistula as a cause of his symptoms  -Agree with flomax and hydration to allow stone to pass on its own  -Recommend IR consult for drain for pelvic abscess  -Continue brito catheter for neurogenic bladder and have nurses irrigate as needed for poor drainage.   However, I suspect most urine coming from colostomy.    -Antibiotics per primary team.   -Will follow       Sammy Arriaga NP  [facility]

## 2021-05-26 NOTE — PROGRESS NOTES
Spoke with Dr. Myers Hopcallum regarding CT Cystogram, change to CT pelvis without contrast due to IV contrast still in bladder from 5/25/21. Recommended changing brito catheter for better positioning for draining, informed RN. Please reorder CT cystogram if/when needed.

## 2021-05-26 NOTE — CONSULTS
[address] Urology Consult Note                                           05/25/21     Patient: Quentin Suero  MRN: [id number]    Admission Date:  5/25/2021, 0  Admission Diagnosis: Pelvic abscess in male [facility]) [K65.1]  Reason for Consult: Pelvic Abscess, Ureteral Stone, Possible Colovesical Fistula     ASSESSMENT: 66 y.o.  male with possible small distal R ureteral stone, recurrent pelvic abscess and possible colovesical fistula. Fluid Creatinine from colostomy consistent with urine    PLAN:  -CT cystogram ordered to further evaluate for possible fistula as a cause of his symptoms  -Agree with flomax and hydration to allow stone to pass on its own  -Recommend IR consult for drain for pelvic abscess  -Continue brito catheter for neurogenic bladder and have nurses irrigate as needed for poor drainage. However, I suspect most urine coming from colostomy.    -Antibiotics per primary team.   -Will follow         __________________________________________________________________________________    HPI:     Quentin Suero is a 66 y.o.  male with multiple medical co-morbid ties who lately has had problems with decreased brito catheter drainage. He presented to the ED for cc stool coming out of his Brito bag and pre sacral drain for the past 1 week. He also noticed that his brito catheter output I much less for the past 1 week. Fluid    creatinine from colostomy bag consistent with urine (31. He has a PMH of rectal cancer s/p XRT, colostomy and removal of sigmoid colon, anxiety, depression, hypothyroidism, diversion proctitis, multiple abdominal surgeries, and recent DC from our facility on 5/15 after he was diagnosed with small bowel obstruction and right pre-sacral tumor.  Patient also had intrapelvic abscess which was drained by IR. Was on ceftriaxone and Flagyl as inpatient and switched to Ciprofloxacin, Flagyl to complete 2-week course of antibiotics. CT A/P:   Developing retrovesicular prostatic fluid collection containing punctate air concerning for abscess. Moderate distention of bladder with Bishop balloon in place. Presacral drain in place with previous sigmoid resection. 4. Increasing dilatation right renal collecting system with layering stones in the right lower renal pole prominent extrarenal pelvis as well as ureteral dilatation and questionable right UVJ stone.     Past Medical History:  Past Medical History:   Diagnosis Date    Acute deep vein thrombosis (DVT) of non-extremity vein 5/20/2019    Family history of malignant neoplasm of gastrointestinal tract     mother colon/ovarian cancer 67    Fecal incontinence     LAR syndrome    Former cigarette smoker     History of rectal cancer     Hypothyroid     stable w/med    Infection and inflammatory reaction due to other internal prosthetic devices, implants and grafts, subsequent encounter 2017    abd wound/mesh colostomy    Insomnia     takes meds    Major depression     Osteoarthritis, hand     Personal history of colonic polyps 9/2013    x 1    Personal history of malignant neoplasm of rectum, rectosigmoid junction, and anus 9/2013    Psychiatric disorder     anxiety       Past Surgical History:  Past Surgical History:   Procedure Laterality Date    COLONOSCOPY N/A 2/12/2018    COLONOSCOPY / BMI=21 performed by Mina Cancino MD at [facility]    COLONOSCOPY N/A 3/5/2021    COLONOSCOPY/BMI 19 performed by Uche Mayorga MD at [address] [place] [address]  2/12/2018         COLONOSCOPY THRU STOMA,LESN RMVL W/SNARE  3/5/2021         ENDOSCOPY, COLON, DIAGNOSTIC  last 2/3/15    Emily--no polyps--3 year recall    HX COLOSTOMY  5/11/16    HX GI  4/2016    Sacral nerve stimlator lead trial (unsucessful) and removal    HX HERNIA REPAIR  3/2015, 5/2016    HX HERNIA REPAIR      and Colostomy in May    HX OTHER SURGICAL  10/7/2013 Emily---endorectal us    HX OTHER SURGICAL Bilateral     cataracts  2017    HX POLYPECTOMY      HX TOTAL COLECTOMY  2013    Emily--lap LAR with coloproctostomy, mobilization splenic flexure, diverting loop ileostomy    HX TOTAL COLECTOMY Right 2014    for leak    HX VASCULAR ACCESS Left     single lumen port/subsequently removed    IR INJ ABS CYST VIA CATHETER / TUBE  2021    IR INSERT TUNL CVC W/O PORT OVER 5 YR  2019    IR REPLACE CVC TUNNELED W/O PORT  2019       Medications:  No current facility-administered medications on file prior to encounter. Current Outpatient Medications on File Prior to Encounter   Medication Sig Dispense Refill    amoxicillin-clavulanate (AUGMENTIN) 875-125 mg per tablet Take 1 Tab by mouth every twelve (12) hours for 10 days. 20 Tab 0    tamsulosin (FLOMAX) 0.4 mg capsule Take 1 Cap by mouth daily for 30 days. 30 Cap 0    [] Saccharomyces boulardii (Florastor) 250 mg capsule Take 2 Caps by mouth two (2) times a day for 10 days. 40 Cap 0    mesalamine (ROWASA) 4 gram/60 mL enema Insert 60 mL into rectum every other day. Indications: diversion proctitis (Patient taking differently: Insert 60 mL into rectum two (2) times a day. Indications: diversion proctitis) 1000 mL 2    levothyroxine (SYNTHROID) 125 mcg tablet TAKE ONE TABLET BY MOUTH ONE TIME DAILY BEFORE BREAKFAST 90 Tab 3    clonazePAM (KLONOPIN) 2 mg tablet Take 2 Tabs by mouth nightly. Max Daily Amount: 4 mg. Indications: takes for sleep (Patient taking differently: Take 2 Tabs by mouth nightly. 3 tabs by mouth at night for sleep  Indications: takes for sleep) 15 Tab 0    amitriptyline (ELAVIL) 100 mg tablet Take 1 Tab by mouth nightly. Dr Nikolai Haines psych  Indications: takes for sleep 15 Tab 0    OLANZapine (ZYPREXA) 2.5 mg tablet Take 7.5 mg by mouth nightly.  Indications: Takes 3 x 2.5 mg tabs      Cholecalciferol, Vitamin D3, (VITAMIN D3) 2,000 unit cap capsule Take 2,000 Units by mouth two (2) times a day. 180 Cap 3       Allergies:  No Known Allergies    Social History:  Social History     Socioeconomic History    Marital status: SINGLE     Spouse name: Not on file    Number of children: Not on file    Years of education: Not on file    Highest education level: Not on file   Occupational History    Not on file   Tobacco Use    Smoking status: Former Smoker     Packs/day: 1.00     Years: 4.00     Pack years: 4.00     Types: Cigarettes     Quit date: 1963     Years since quittin.5    Smokeless tobacco: Never Used   Substance and Sexual Activity    Alcohol use: Not Currently     Alcohol/week: 11.7 standard drinks     Types: 14 Cans of beer per week     Comment: advised stop drinking given issues    Drug use: No    Sexual activity: Never   Other Topics Concern    Not on file   Social History Narrative    Not on file     Social Determinants of Health     Financial Resource Strain:     Difficulty of Paying Living Expenses:    Food Insecurity:     Worried About Running Out of Food in the Last Year:     Ran Out of Food in the Last Year:    Transportation Needs:     Lack of Transportation (Medical):      Lack of Transportation (Non-Medical):    Physical Activity:     Days of Exercise per Week:     Minutes of Exercise per Session:    Stress:     Feeling of Stress :    Social Connections:     Frequency of Communication with Friends and Family:     Frequency of Social Gatherings with Friends and Family:     Attends [employer] Services:     Active Member of Clubs or Organizations:     Attends Club or Organization Meetings:     Marital Status:    Intimate Partner Violence:     Fear of Current or Ex-Partner:     Emotionally Abused:     Physically Abused:     Sexually Abused:        Family History:  Family History   Problem Relation Age of Onset    Cancer Mother         colon and ovarian    Cancer Brother         prostate    Alcohol abuse Brother     Cancer Maternal Grandmother         bone    Alcohol abuse Father     Alcohol abuse Sister     Stroke Paternal Grandfather        ROS:  Review of Systems - General ROS: negative for - chills, fatigue or fever  Respiratory ROS: no cough, shortness of breath, or wheezing  Cardiovascular ROS: no chest pain or dyspnea on exertion  Gastrointestinal ROS: positive for - abdominal pain  negative for - change in bowel habits, gas/bloating, heartburn or nausea/vomiting  Genito-Urinary ROS: no dysuria, trouble voiding, or hematuria  Musculoskeletal ROS: negative  negative for - muscular weakness     Vitals:  Visit Vitals  BP (!) 140/86   Pulse 64   Temp 98.3 °F (36.8 °C)   Resp 12   Ht 5' 8\" (1.727 m)   Wt 115 lb (52.2 kg)   SpO2 97%   BMI 17.49 kg/m²       Intake/Output:    Intake/Output Summary (Last 24 hours) at 5/25/2021 5953  Last data filed at 5/25/2021 1142  Gross per 24 hour   Intake 100 ml   Output    Net 100 ml        Physical Exam:   Visit Vitals  BP (!) 140/86   Pulse 64   Temp 98.3 °F (36.8 °C)   Resp 12   Ht 5' 8\" (1.727 m)   Wt 115 lb (52.2 kg)   SpO2 97%   BMI 17.49 kg/m²        GENERAL: No acute distress, Awake, Alert, Oriented X 3, Gait normal  HEENT: Trachea midline, No gross visual or auditory impairments  CARDIAC: regular rate and rhythm  CHEST AND LUNG: Easy work of breathing, clear to auscultation bilaterally, no cyanosis  ABDOMEN: soft, non tender, non-distended, positive bowel sounds, no organomegaly, no palpable masses, no guarding, no rebound tenderness, LLQ colostomy with stool/urine in bag  : Circumcised phallus without abnormality, Testicles descended in scrotum bilaterally and without palpable mass/nodule, non-tender, no edema, no skin erythema, vas deferens palpable bilaterally, no hydrocele, no inguinal hernias, no inguinal lymphadenopathy  SKIN: No rash, no erythema, no lacerations or abrasions, no ecchymosis  NEUROLOGIC: cranial nerves 2-12 grossly intact     Lab/Radiology/Other Diagnostic Tests:  Recent Labs     05/25/21  0736   HGB 11.5*   HCT 36.1*   WBC 3.9*      K 3.8      CO2 27   BUN 24*   GLU 81   CA 8.8   AST 28   ALT 25     CT A/P:   IMPRESSION     1.Developing retrovesicular prostatic fluid collection containing punctate air  concerning for abscess  2. Moderate distention of bladder with Bishop balloon in place. 3. Presacral drain in place with previous sigmoid resection. 4. Increasing  dilatation right renal collecting system with layering stones in the right lower  renal pole prominent extrarenal pelvis as well as ureteral dilatation and  questionable right UVJ stone. Jack Mtz M.D.     [facility] Urology  [facility]  Phone: [phone]  Fax: [phone]

## 2021-05-26 NOTE — PROGRESS NOTES
Hourly rounds completed this shift. Patient denies complaints of discomfort. Patient will not allow staff to empty colostomy bag. Bishop had minimal output. DIANE did not have any output. Bed in low position and call light within reach of patient. Will report off to oncoming nurse.

## 2021-05-26 NOTE — PROGRESS NOTES
Assisted patient to bathroom to empty ostomy. Patient refuses help with ostomy and empties into toilet without allowing output to be measured. Patient stated, \"it makes too big of a mess to empty it in a container. \"  Bishop remains to leg bag per patient request. .  Denies complaints of discomfort.

## 2021-05-26 NOTE — PROGRESS NOTES
Problem: Falls - Risk of  Goal: *Absence of Falls  Description: Document Andre Petrona Fall Risk and appropriate interventions in the flowsheet. Outcome: Progressing Towards Goal  Note: Fall Risk Interventions:                      History of Falls Interventions:  Investigate reason for fall

## 2021-05-27 ENCOUNTER — APPOINTMENT (OUTPATIENT)
Dept: INTERVENTIONAL RADIOLOGY/VASCULAR | Age: 76
DRG: 698 | End: 2021-05-27
Attending: INTERNAL MEDICINE
Payer: MEDICARE

## 2021-05-27 PROCEDURE — 3331090001 HH PPS REVENUE CREDIT

## 2021-05-27 PROCEDURE — 75984 XRAY CONTROL CATHETER CHANGE: CPT

## 2021-05-27 PROCEDURE — 74011250637 HC RX REV CODE- 250/637: Performed by: FAMILY MEDICINE

## 2021-05-27 PROCEDURE — 0T2BX0Z CHANGE DRAINAGE DEVICE IN BLADDER, EXTERNAL APPROACH: ICD-10-PCS | Performed by: RADIOLOGY

## 2021-05-27 PROCEDURE — 74011000258 HC RX REV CODE- 258: Performed by: FAMILY MEDICINE

## 2021-05-27 PROCEDURE — 99232 SBSQ HOSP IP/OBS MODERATE 35: CPT | Performed by: UROLOGY

## 2021-05-27 PROCEDURE — 65270000029 HC RM PRIVATE

## 2021-05-27 PROCEDURE — 77030027478 HC TBNG JP W BLB MRTM -B

## 2021-05-27 PROCEDURE — 3331090002 HH PPS REVENUE DEBIT

## 2021-05-27 PROCEDURE — 74011000636 HC RX REV CODE- 636: Performed by: RADIOLOGY

## 2021-05-27 PROCEDURE — 77030002916 HC SUT ETHLN J&J -A

## 2021-05-27 PROCEDURE — C1729 CATH, DRAINAGE: HCPCS

## 2021-05-27 PROCEDURE — 74011250636 HC RX REV CODE- 250/636: Performed by: FAMILY MEDICINE

## 2021-05-27 PROCEDURE — C1769 GUIDE WIRE: HCPCS

## 2021-05-27 RX ORDER — LIDOCAINE HYDROCHLORIDE 20 MG/ML
40-120 INJECTION, SOLUTION INFILTRATION; PERINEURAL ONCE
Status: ACTIVE | OUTPATIENT
Start: 2021-05-27 | End: 2021-05-28

## 2021-05-27 RX ADMIN — SODIUM CHLORIDE 75 ML/HR: 900 INJECTION, SOLUTION INTRAVENOUS at 00:42

## 2021-05-27 RX ADMIN — AMITRIPTYLINE HYDROCHLORIDE 100 MG: 50 TABLET, FILM COATED ORAL at 21:45

## 2021-05-27 RX ADMIN — TAMSULOSIN HYDROCHLORIDE 0.4 MG: 0.4 CAPSULE ORAL at 08:57

## 2021-05-27 RX ADMIN — SODIUM CHLORIDE 75 ML/HR: 900 INJECTION, SOLUTION INTRAVENOUS at 14:04

## 2021-05-27 RX ADMIN — OLANZAPINE 7.5 MG: 5 TABLET, FILM COATED ORAL at 21:44

## 2021-05-27 RX ADMIN — MESALAMINE 1000 MG: 1000 SUPPOSITORY RECTAL at 08:56

## 2021-05-27 RX ADMIN — VITAMIN D, TAB 1000IU (100/BT) 2000 UNITS: 25 TAB at 08:57

## 2021-05-27 RX ADMIN — IOPAMIDOL 15 ML: 612 INJECTION, SOLUTION INTRAVENOUS at 15:31

## 2021-05-27 RX ADMIN — PIPERACILLIN SODIUM AND TAZOBACTAM SODIUM 3.38 G: 3; .375 INJECTION, POWDER, LYOPHILIZED, FOR SOLUTION INTRAVENOUS at 09:03

## 2021-05-27 RX ADMIN — LEVOTHYROXINE SODIUM 125 MCG: 0.07 TABLET ORAL at 05:14

## 2021-05-27 RX ADMIN — CLONAZEPAM 6 MG: 1 TABLET ORAL at 20:23

## 2021-05-27 RX ADMIN — ACETAMINOPHEN 650 MG: 325 TABLET ORAL at 03:57

## 2021-05-27 RX ADMIN — PIPERACILLIN SODIUM AND TAZOBACTAM SODIUM 3.38 G: 3; .375 INJECTION, POWDER, LYOPHILIZED, FOR SOLUTION INTRAVENOUS at 16:04

## 2021-05-27 RX ADMIN — MESALAMINE 1000 MG: 1000 SUPPOSITORY RECTAL at 17:14

## 2021-05-27 RX ADMIN — PIPERACILLIN SODIUM AND TAZOBACTAM SODIUM 3.38 G: 3; .375 INJECTION, POWDER, LYOPHILIZED, FOR SOLUTION INTRAVENOUS at 00:38

## 2021-05-27 NOTE — CONSULTS
Consult Note       Kaden Marroquin  MRN: [id number]  :1945  Age:75 y.o.       HPI: Vinny Richardson is a 66 y.o. male who we are asked by Dr. Dimas Hutchinson to see for colovesical Lannyyani Cortez is well known to surgery, having an extremely complicated course since diagnosis of rectal cancer in  s/p LAR by Dr Lyndsey Bradley after neoadjuvant chemoradiation  with complete response, followed by definitive surgery and completion chemotherapy. Sonali Bhatti has subsequently had numerous surgical complications after stoma closure to include anastomotic leak(s), wound infections, hernia and mesh infection, and fistula formation. His most recent ex lap was 2019 for SBO. He was seen by our service during his most recent hospitalization [id number] for findings of a presacral tumor s/p IR drain for intrapelvic abscess; THERE WAS NO TUMOR. Surgery is now consulted due to report that he presented with stool coming from his brito bag and IR drain. He was admitted on 2021 for concern for fistula. Urology has been consulted and has recommended CT cystogram. Also, colostomy creatinine level checked which was 31. Other labs unremarkable. Pt clearly states that he did NOT have stool from the brito. The day prior to admission he had called 911 to take him to the ER because he'd had zero output from his brito cather \"all day. \"  Prior to ambulance arrival, it \"broke loose\" and he had large volume of urine output so he called back and cancelled the ambulance. Yesterday he had the same occurrence with no urine from the brito so he again called for ambulance and presented to the ER. At no point did he have stool from his brito. He does report, however, significant volume of yellowish liquid, along with small volumes of stool, in his colostomy bag; he has referred to this fluid as urine. Rajiv Lopez denies n/v, fever, chills, blood per brito or colostomy.   The recently placed (and replaced due to accidental dislodgement) perc drain has been draining feculent material since insertion.              Past Medical History:   Diagnosis Date   · Acute deep vein thrombosis (DVT) of non-extremity vein 5/20/2019   · Family history of malignant neoplasm of gastrointestinal tract       mother colon/ovarian cancer 67   · Fecal incontinence       LAR syndrome   · Former cigarette smoker     · History of rectal cancer     · Hypothyroid       stable w/med   · Infection and inflammatory reaction due to other internal prosthetic devices, implants and grafts, subsequent encounter 2017     abd wound/mesh colostomy   · Insomnia       takes meds   · Major depression     · Osteoarthritis, hand     · Personal history of colonic polyps 9/2013     x 1   · Personal history of malignant neoplasm of rectum, rectosigmoid junction, and anus 9/2013   · Psychiatric disorder       anxiety       Past Surgical History:   Procedure Laterality Date   · COLONOSCOPY N/A 2/12/2018     COLONOSCOPY / BMI=21 performed by Dalia Dooley MD at [address] [facility] A   · COLONOSCOPY N/A 3/5/2021     COLONOSCOPY/BMI 19 performed by Samia Pickett MD at [address] [place] [address] A   · COLONOSCOPY THRU STOMA   2/12/2018       · COLONOSCOPY THRU STOMA,LESN RMVL W/SNARE   3/5/2021       · ENDOSCOPY, COLON, DIAGNOSTIC   last 2/3/15     Emily--no polyps--3 year recall   · HX COLOSTOMY   5/11/16   · HX GI   4/2016     Sacral nerve stimlator lead trial (unsucessful) and removal   · HX HERNIA REPAIR   3/2015, 5/2016   · HX HERNIA REPAIR         and Colostomy in May   · HX OTHER SURGICAL   10/7/2013     Emily---endorectal us   · HX OTHER SURGICAL Bilateral       cataracts  2017   · HX POLYPECTOMY       · HX TOTAL COLECTOMY   11/2013     Emily--lap LAR with coloproctostomy, mobilization splenic flexure, diverting loop ileostomy   · HX TOTAL COLECTOMY Right 8/2014     for leak   · HX VASCULAR ACCESS Left 4/14     single lumen port/subsequently removed   · IR INJ ABS CYST VIA CATHETER / TUBE   5/20/2021   · IR INSERT TUNL CVC W/O PORT OVER 5 YR   2019   · IR REPLACE CVC TUNNELED W/O PORT   2019       Current Facility-Administered Medications   Medication Dose Route Frequency   · amitriptyline (ELAVIL) tablet 100 mg 100 mg Oral QHS   · cholecalciferol (VITAMIN D3) (1000 Units /25 mcg) tablet 2,000 Units 2,000 Units Oral DAILY   · levothyroxine (SYNTHROID) tablet 125 mcg 125 mcg Oral 6am   · OLANZapine (ZyPREXA) tablet 7.5 mg 7.5 mg Oral QHS   · tamsulosin (FLOMAX) capsule 0.4 mg 0.4 mg Oral DAILY   · mesalamine (CANASA) suppository 1,000 mg 1 Suppository Rectal BID   · acetaminophen (TYLENOL) tablet 650 mg 650 mg Oral Q6H PRN     Or   · acetaminophen (TYLENOL) suppository 650 mg 650 mg Rectal Q6H PRN   · polyethylene glycol (MIRALAX) packet 17 g 17 g Oral DAILY PRN   · ondansetron (ZOFRAN) injection 4 mg 4 mg IntraVENous Q6H PRN   · 0.9% sodium chloride infusion 75 mL/hr IntraVENous CONTINUOUS   · piperacillin-tazobactam (ZOSYN) 3.375 g in 0.9% sodium chloride (MBP/ADV) 100 mL MBP 3.375 g IntraVENous Q8H   · clonazePAM (KlonoPIN) tablet 6 mg 6 mg Oral QHS       Patient has no known allergies.    Social History           Socioeconomic History   · Marital status: SINGLE       Spouse name: Not on file   · Number of children: Not on file   · Years of education: Not on file   · Highest education level: Not on file   Tobacco Use   · Smoking status: Former Smoker       Packs/day: 1.00       Years: 4.00       Pack years: 4.00       Types: Cigarettes       Quit date: 1963       Years since quittin.5   · Smokeless tobacco: Never Used   Substance and Sexual Activity   · Alcohol use: Not Currently       Alcohol/week: 11.7 standard drinks       Types: 14 Cans of beer per week       Comment: advised stop drinking given issues   · Drug use: No   · Sexual activity: Never         Social Determinants of Health         Financial Resource Strain:   · Difficulty of Paying Living Expenses:   Food Insecurity:   · Worried About Running Out of Food in the Last Year:   · Ran Out of Food in the Last Year:   Transportation Needs:   · Lack of Transportation (Medical): · Lack of Transportation (Non-Medical):   Physical Activity:   · Days of Exercise per Week:   · Minutes of Exercise per Session:   Stress:   · Feeling of Stress :   Social Connections:   · Frequency of Communication with Friends and Family:   · Frequency of Social Gatherings with Friends and Family:   · Attends [employer] Services:   · Active Member of Clubs or Organizations:   · Attends Club or Organization Meetings:   · Marital Status:         Social History       Tobacco Use   Smoking Status Former Smoker   · Packs/day: 1.00   · Years: 4.00   · Pack years: 4.00   · Types: Cigarettes   · Quit date: 1963   · Years since quittin.5   Smokeless Tobacco Never Used       Family History   Problem Relation Age of Onset   · Cancer Mother          colon and ovarian   · Cancer Brother          prostate   · Alcohol abuse Brother     · Cancer Maternal Grandmother          bone   · Alcohol abuse Father     · Alcohol abuse Sister     · Stroke Paternal Grandfather         ROS: The patient has no difficulty with chest pain or shortness of breath.  No fever or chills.  Comprehensive review of systems was otherwise unremarkable except as noted above.       Physical Exam:   Visit Vitals   /77 (BP 1 Location: Left arm, BP Patient Position: At rest)   Pulse 87   Temp 97.9 °F (36.6 °C)   Resp 18   Ht 5' 8\" (1.727 m)   Wt 115 lb (52.2 kg)   SpO2 98%   BMI 17.49 kg/m²       Constitutional: Alert, oriented, cooperative patient in no acute distress; appears stated age, cachectic but this appearance is stable  Eyes:Sclera are clear. EOMs intact   ENMT: no external lesions gross hearing normal; no obvious neck masses, no ear or lip lesions; oral mucosa and lips dry as usual   CV: RRR.  Normal perfusion   Resp: No JVD.  Breathing is  non-labored; no audible wheezing.     GI: soft and non-distended, non tender, watery output in colostomy; presacral (right hip) perc, drain with residual seen in bulb   : clear urine in leg bag     Musculoskeletal: unremarkable with normal function. No embolic signs or cyanosis. Neuro:  Oriented; moves all 4; no focal deficits   Psychiatric: normal affect and mood, no memory impairment       Recent vitals (if inpt):   Patient Vitals for the past 24 hrs:     BP Temp Pulse Resp SpO2   05/26/21 0850 121/77 97.9 °F (36.6 °C) 87 18 98 %   05/26/21 0354 130/72 97.6 °F (36.4 °C) 62 16 94 %   05/25/21 2328 116/62 97.3 °F (36.3 °C) (!) 59 15 96 %   05/25/21 1941 (!) 140/86 98.3 °F (36.8 °C) 64 12 97 %   05/25/21 1752 (!) 162/89 - 70 16 100 %   05/25/21 1722 - 97.6 °F (36.4 °C) - - -   05/25/21 1625 (!) 168/85 - 76 - 99 %   05/25/21 1307 - - 80 - 99 %   05/25/21 1259 (!) 149/85 - 78 - 98 %   05/25/21 1229 (!) 157/77 - 65 - 99 %   05/25/21 1159 (!) 149/85 - 61 - 98 %   05/25/21 1143 (!) 154/89 - 65 - -   05/25/21 1142 - - 61 - 95 %           Labs:   Recent Labs     05/26/21   0422 05/25/21   0736   WBC 3.4* 3.9*   HGB 10.2* 11.5*    255    140   K 3.8 3.8   * 107   CO2 26 27   BUN 19 24*   CREA 1.01 1.08   GLU 59* 81   TBILI -- 0.4   ALT -- 25   AP -- 74           Lab Results   Component Value Date/Time     WBC 3.4 (L) 05/26/2021 04:22 AM     HGB 10.2 (L) 05/26/2021 04:22 AM     PLATELET 816 10/23/2030 04:22 AM     Sodium 143 05/26/2021 04:22 AM     Potassium 3.8 05/26/2021 04:22 AM     Chloride 110 (H) 05/26/2021 04:22 AM     CO2 26 05/26/2021 04:22 AM     BUN 19 05/26/2021 04:22 AM     Creatinine 1.01 05/26/2021 04:22 AM     Glucose 59 (L) 05/26/2021 04:22 AM     INR 1.2 05/20/2019 05:03 AM     aPTT 38.8 05/21/2019 05:47 AM     Bilirubin, total 0.4 05/25/2021 07:36 AM     Bilirubin, direct 0.1 05/13/2021 04:50 AM     ALT (SGPT) 25 05/25/2021 07:36 AM     Alk.  phosphatase 74 05/25/2021 07:36 AM     Lipase 111 05/11/2021 08:12 AM     Troponin-I, Qt. <0.04 05/22/2016 11:15 AM           I reviewed recent labs and recent radiologic studies. CT Results (most recent):   Results from Hospital Encounter encounter on 05/25/21       CT ABD PELV W CONT       Narrative   Exam: CT ABD PELV W CONT on 5/25/2021 11:15 AM       Clinical History: The Male patient is 66years old  presenting for c/o no urine   output in his catheter x 2 days.       Technique: Thin slice axial images were obtained through the abdomen and pelvis with   intravenous and with oral contrast.  Coronal reformatted images were also   provided for review. A total of 100 ml of Iopamidol (ISOVUE-370) 76 % contrast was administered   intravenously.       All CT scans at this facility are performed using dose reduction/dose modulation   techniques, as appropriate the performed exam, including the following:   Automated Exposure Control; Adjustment of the mA and/or kV according to patient   size (this includes techniques or standardized protocols for targeted exams   where dose is matched to indication/reason for exam); and Use of Iterative   Reconstruction Technique.       Radiation Exposure Indices:   Reference Air Kerma (Ka,r) = 603 mGy-cm       COMPARISON:  Abdomen pelvis CT 5/11/2021.       FINDINGS:       Abdomen:   Lung bases: There is continued moderate elevation the left hemidiaphragm with   minimal adjacent compressive atelectasis.       Liver: Is mild hepatic steatosis.       Biliary: Unremarkable gallbladder. No evidence of intra or extrahepatic biliary   dilatation.       Pancreas: Normal in size and contour without focal lesion.       Spleen: Normal in size and contour without focal lesion.       Adrenal glands: Normal in size without focal lesion.       Kidneys: There is slightly increasing mild hydronephrosis of the right renal   collecting system with stones layering in the posterior lower pole of the right   kidney.  A prominent right extrarenal pelvis is seen with dilatation of the ureter to the bladder and a possible distal right UVJ stone which measures   approximately 3 mm.       Bowel: Left lower quadrant ostomy. Sigmoid resection.       Retroperitoneum/vasculature: No evidence of significant adenopathy. Unremarkable   aorta and IVC.       Abdominal soft tissues: Unremarkable.       Osseous structures: No acute osseous abnormality.       Pelvis:   Bishop balloon in place with moderately distended bladder containing minimal air. Presacral drain remains in place. There is a developing small retrovesicular   prostatic fluid collection measuring 18 x 31 mm containing a punctate collection   of air       Impression   1. Developing retrovesicular prostatic fluid collection containing punctate air   concerning for abscess   2. Moderate distention of bladder with Bishop balloon in place. 3. Presacral drain in place with previous sigmoid resection. 4. Increasing   dilatation right renal collecting system with layering stones in the right lower   renal pole prominent extrarenal pelvis as well as ureteral dilatation and   questionable right UVJ stone.       CPT code(s): 44558, 16569           I independently reviewed radiology images for studies I described above or studies I have ordered. Admission date (for inpatients): 5/25/2021   * No surgery found *  * No surgery found *       ASSESSMENT/PLAN:   Problem List Date Reviewed: 12/10/2020     Codes Class Noted     * (Principal) Pelvic abscess in male [facility]) ICD-10-CM: K65.1   ICD-9-CM: 567.22   5/13/2021           Anxiety ICD-10-CM: F41.9   ICD-9-CM: 300.00   5/11/2021           Depression ICD-10-CM: F32.9   ICD-9-CM: 102   5/11/2021           UTI (urinary tract infection) ICD-10-CM: N39.0   ICD-9-CM: 599.0   5/11/2021           Bilateral hydronephrosis ICD-10-CM: N13.30   ICD-9-CM: 084   5/11/2021           Dilated intrahepatic bile duct ICD-10-CM: K83.8   ICD-9-CM: 198. 8   5/11/2021           Pancreatic duct dilated ICD-10-CM: K86.89 ICD-9-CM: 577.8   5/11/2021           Major depression ICD-10-CM: F32.9   ICD-9-CM: 296.20   Unknown           Hypothyroid ICD-10-CM: E03.9   ICD-9-CM: 609. 9   6/14/2019     Overview Signed 6/14/2019 12:12 AM by Gm Dickinson MD         stable w/med                 Fever ICD-10-CM: R50.9   ICD-9-CM: 780.60   6/13/2019           Acute UTI (urinary tract infection) ICD-10-CM: N39.0   ICD-9-CM: 599.0   6/13/2019           Anemia ICD-10-CM: D64.9   ICD-9-CM: 570. 9   6/13/2019           Elevated LFTs ICD-10-CM: R79.89   ICD-9-CM: 790.6   6/13/2019           Acute blood loss anemia ICD-10-CM: D62   ICD-9-CM: 285. 1   5/21/2019           Acute deep vein thrombosis (DVT) of non-extremity vein ICD-10-CM: I82.90   ICD-9-CM: 453. 9   5/20/2019           DVT (deep vein thrombosis) in pregnancy ICD-10-CM: O22.30   ICD-9-CM: 671.30   5/20/2019           Severe protein-calorie malnutrition ([facility]) ICD-10-CM: E43   ICD-9-CM: 262   5/19/2019           On total parenteral nutrition (TPN) ICD-10-CM: Z78.9   ICD-9-CM: V49.89   5/19/2019           Colostomy care [facility]) ICD-10-CM: Z43.3   ICD-9-CM: V55.3   5/19/2019           Chronic indwelling Bishop catheter ICD-10-CM: Z97.8   ICD-9-CM: V45.89   5/19/2019           Moderate protein-calorie malnutrition ([facility]) ICD-10-CM: E44.0   ICD-9-CM: 263.0   5/19/2019           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   ICD-9-CM: 569.81   4/29/2019           Small bowel obstruction ([facility]) ICD-10-CM: I91.211   ICD-9-CM: 560.9   3/30/2019           SBO (small bowel obstruction) ([facility]) ICD-10-CM: H01.126   ICD-9-CM: 560.9   3/22/2019           Infection of implant ICD-10-CM: T85.79XA   ICD-9-CM: 996.60   3/30/2017           Abdominal wall abscess ICD-10-CM: L02.211   ICD-9-CM: 682. 2   5/22/2016           Infected prosthetic mesh of abdominal wall (HCC) ICD-10-CM: T85.79XA   ICD-9-CM: 996.69   5/22/2016           Abscess ICD-10-CM: L02.91   ICD-9-CM: 225. 9   5/22/2016           Fecal incontinence ICD-10-CM: R15.9   ICD-9-CM: 787.60   5/11/2016           Bowel incontinence ICD-10-CM: R15.9   ICD-9-CM: 787.60   5/11/2016           Weight loss ICD-10-CM: R63.4   ICD-9-CM: 783.21 Acute 11/19/2015           Hypercalcemia of malignancy ICD-10-CM: E83.52   ICD-9-CM: 275.42   11/19/2015           Hypokalemia ICD-10-CM: E87.6   ICD-9-CM: 276.8   8/29/2014           Hypomagnesemia ICD-10-CM: N51.95   ICD-9-CM: 275.2   8/29/2014           Pleural effusion ICD-10-CM: J90   ICD-9-CM: 024. 9   8/29/2014           Hypernatremia ICD-10-CM: E87.0   ICD-9-CM: 276.0   8/29/2014           Severe sepsis with acute organ dysfunction (HCC) ICD-10-CM: A41.9, R65.20   ICD-9-CM: 038.9, 995.92   8/24/2014           Hypoxemia ICD-10-CM: R09.02   ICD-9-CM: 799.02   8/24/2014           Peritonitis (acute) generalized ICD-10-CM: K65.0   ICD-9-CM: 567.21   8/24/2014           Attention to ileostomy [facility]) ICD-10-CM: Z43.2   ICD-9-CM: V55.2   8/20/2014           Dehydration ICD-10-CM: E86.0   ICD-9-CM: 276.51   4/17/2014           Ileus (HCC) ICD-10-CM: K56.7   ICD-9-CM: 560. 1   3/20/2014           Postoperative ileus ([facility] [address] ICD-10-CM: K91.89, K56.7   ICD-9-CM: 997.49, 560.1   2/7/2014           Constipation ICD-10-CM: K59.00   ICD-9-CM: 564.00   10/29/2013           Rectal cancer (HCC) ICD-10-CM: C20   ICD-9-CM: 154. 1   10/22/2013                 Principal Problem:     Pelvic abscess in male [facility]) (5/13/2021)       Active Problems:     Rectal cancer ([address] (10/22/2013)         Hypothyroid (6/14/2019)       Overview: stable w/med         Major depression ()               Recommend evaluation by IR to confirm appropriate position of his percutaneous drain. I explained to Vamsi Jaquan that there is no concrete evidence of a urinary fistula to his (remnant) descending colostomy. There is normally creatinine in stool, although I have never seen what constitutes a \"normal\" fecal creatinine level.   Anatomically, especially in this patient, I struggle to imagine a spontaneous fistula over the distance between his colon and bladder. I would only render the diagnosis if a contrast study definitively demonstrated a fistula (retrograde cystogram). No prior exam has suggested any passage of iv contrast into the stoma. The CT yesterday does suggest significant stool burden- will give laxative to try to evacuate colon. He does have more pronounced right hydronephrosis, with a probable small stone in the distal ureter; the prostate remains abnormal; urology is following and treating conservatively. In the absence of a proven colovesical or colo-ureteral fistula, I would not take any action. Lizzeth Tran has a very hostile abdomen thus surgery would not be an option anyway, barring a life-threatening indication. Mr Rodrigue Cervantes indicates possible surgery by urology- suspect this would be for a suprapubic tube for better drainage. Since the bladder is extraperitoneal his surgical history would not complicate this. He should continue with monitoring by IR for his perc drain in the pelvic abscess. Surgery will follow peripherally. I have encouraged him to clarify his history with all members of his treatment team to avoid confusion as noted in HPI above.     Jose Mackenzie MD

## 2021-05-27 NOTE — PROGRESS NOTES
Chart reviewed. Pt lives alone and previous independent with ADLs. Hospitalist, General Surgery, and Urology following patient. Pt to have new Bishop placed today in hopes of better drainage. Pt current with [facility] and will resume at d/c if indicated. CM to continue to follow and monitor for any further needs.

## 2021-05-27 NOTE — PROCEDURES
Department of Interventional Radiology  [phone]        Interventional Radiology Brief Procedure Note    Patient: Chivo Ramirez MRN: [id number]  SSN: [id number]    YOB: 1945  Age: 66 y.o. Sex: male      Date of Procedure: 5/27/2021    Pre-Procedure Diagnosis: pelvic fluid collection    Post-Procedure Diagnosis: SAME    Procedure(s): drain exchange    Brief Description of Procedure: as above    Performed By: Kortney Livingston MD     Assistants: None    Anesthesia:Lidocaine    Estimated Blood Loss: Less than 10ml    Specimens:  None    Implants: All Purpose Drain    Findings: existing drain was kinked. Replaced with new 10 F drain. There is a fistula to the large bowel.   The other pelvic collection is not amenable to percutaneous drainage    Complications: None    Recommendations: external drainage     Follow Up: 2 weeks    Signed By: Kortney Livingston MD     May 27, 2021

## 2021-05-27 NOTE — PROGRESS NOTES
Urology Progress Note    Admit Date: 5/25/2021    Subjective:     Patient has no new complaints this AM.  Afebrile. Pain controlled. CT cystogram yesterday showed persistent distended bladder with no obvious report of colo-vesical fistula but did demonstrate a possible small EP bladder perforation. Brito catheter continues to have intermittent drainage issues. Objective:     Patient Vitals for the past 8 hrs:   BP Temp Pulse Resp SpO2   05/27/21 0336 108/67 97.8 °F (36.6 °C) 65 18 95 %     No intake/output data recorded. 05/25 1901 - 05/27 0700  In: 2517 [I.V.:2517]  Out: 26 [Urine:500; Drains:20]    Physical Exam:   Visit Vitals  /67   Pulse 65   Temp 97.8 °F (36.6 °C)   Resp 18   Ht 5' 8\" (1.727 m)   Wt 115 lb (52.2 kg)   SpO2 95%   BMI 17.49 kg/m²        GENERAL: No acute distress, Awake, Alert, Oriented X 3  CARDIAC: regular rate and rhythm  CHEST AND LUNG: Easy work of breathing  ABDOMEN: soft, non tender, non-distended, colostomy with stool and clear yellow output, IR drain with feculent material.  : Brito with clear yellow output this AM.          Data Review No results found for this or any previous visit (from the past 24 hour(s)). Assessment:     Principal Problem:    Pelvic abscess in male [facility]) (5/13/2021)    Active Problems:    Rectal cancer ([facility] [address] (10/22/2013)      Hypothyroid (6/14/2019)      Overview: stable w/med      Major depression ()      75 y.o.  male with possible small distal R ureteral stone being managed conservatively, recurrent pelvic abscess, poorly draining brito catheter. General Surgery evaluated patient and felt colovesical fistula to be unlikely.     VSS, afebrile, WBC 3.4, Hgb 10.2, Cn 1.01. Urine culture- >100 K yeast    Plan:     -Will ask nursing to change out brito catheter this AM to see if this facilitates better drainage. Management of EP bladder perforations is typically conservative with long term indwelling brito catheter.   May consider cystoscopy, supra-pubic catheter placement to allow for better drainage if urethral brito continues to have issues after the exchange.  -Continue flomax/pain control for conservative management of small ureteral stone  -Will continue to follow. Jack Oneil M.D.     [facility]  [address] [place] [address], [facility] [address]  Phone: [phone]  Fax: [phone]

## 2021-05-27 NOTE — PROGRESS NOTES
Problem: Falls - Risk of  Goal: *Absence of Falls  Description: Document [facility] Fall Risk and appropriate interventions in the flowsheet. Outcome: Progressing Towards Goal  Note: Fall Risk Interventions:            Medication Interventions: Teach patient to arise slowly         History of Falls Interventions:  Investigate reason for fall

## 2021-05-27 NOTE — PROGRESS NOTES
Hospitalist Progress Note     Admit Date:  2021  7:38 AM   Name:  Mikael Marroquin   Age:  66 y.o.  :  1945   MRN:  [id number]   PCP:  Mark Chato, DO  Presenting Complaint: Urinary Catheter Problem    Initial Admission Diagnosis: Pelvic abscess in male [facility]) [K65.1]     Assessment and Plan:     Hospital Problems as of 2021 Date Reviewed: 12/10/2020        Codes Class Noted - Resolved POA    * (Principal) Pelvic abscess in male [facility]) ICD-10-CM: K65.1  ICD-9-CM: 567.22  2021 - Present Unknown        Major depression ICD-10-CM: F32.9  ICD-9-CM: 296.20  Unknown - Present Yes        Hypothyroid ICD-10-CM: E03.9  ICD-9-CM: 244.9  2019 - Present Yes    Overview Signed 2019 12:12 AM by Savannah Pyle MD     stable w/med             Rectal cancer [facility]) ICD-10-CM: C20  ICD-9-CM: 154.1  10/22/2013 - Present Yes              Plan:  # Pre-sacral abscess/prostatic fluid collection  Vancomycin discontinued on   Con't Zosyn  Uncertain significant of prostatic collection. Was on abx for pre-sacral abscess at during prior hospitalization and at discharge, not septic so not convinced that needs specific tx. Follow up blood/urine cultures. IR consulted to evaluate position of his percutaneous drain    # Possible colovesical fistula  CT cystogram showed persistent distended bladder with no obvious colovesical fistula, possible small EP bladder perforation  Surgery consulted and recommend in the absence of proven colovesical fistula no acute intervention requires. Hostile abdomen given numerous prior surgeries. Urology recommend Brito exchange today and observe for now. May consider cystoscopy, suprapubic catheter placement to allow for better drainage if continues to have issues after brito exchange    # R UVJ kidney stone  Continue Flomax    # MDD/anxiety  Klonopin dosing verified on . Zyprexa.     # Hypothyroidism  Synthroid      Other listed chronic conditions stable, continue current management. Discharge planning: Pending. Diet:  DIET REGULAR  DVT ppx: SCDs    Hospital Course:   Mr. Vale Slatre is a nice 65 y/o WM with a h/o rectal cancer s/p colostomy and numerous abdominal surgeries, recurrent SBO, MDD/anxiety, hypothyroidism who was recently hospitalized for pre-sacral abscess. Seen by General Surgery for recurrent SBO and he was managed conservatively. CT showed concern for recurrence of presacral tumor along with new hydronephrosis. Urology consulted and felt no intervention was warranted. IR was consulted and he had CT guided drain placement on 5/13. Drain replaced on 5/20. Cultures with E. Coli and enterococcus. Was discharged and took Augmentin. Returned to ER on 5/25 with c/o low urine output from his Bishop. Repeat CT showed interval development of retrovesicular prostatic fluid colleciton, questionable R UVJ stone. Ostomy fluid with elevated Cr. CT cystogram ordered to assess for fistula. IR and General surgery consulted. On antibiotics, cultures sent. 24hr Events/Subjective:   5/27: Patient is seen at the bedside. Reports he is feeling better. No active complaint. Bishop to change today. Denies chest pain, palpitation, nausea, vomiting. Abdominal pain has been improved. No other complaints  Objective:     Patient Vitals for the past 24 hrs:   Temp Pulse Resp BP SpO2   05/27/21 1220 97.3 °F (36.3 °C) 64 18 111/65 98 %   05/27/21 0741 97.7 °F (36.5 °C) 82 18 122/70 96 %   05/27/21 0336 97.8 °F (36.6 °C) 65 18 108/67 95 %   05/26/21 2336 98 °F (36.7 °C) 62 18 113/66 96 %   05/26/21 1904 98 °F (36.7 °C) 65 18 96/60 96 %   05/26/21 1654 98 °F (36.7 °C) 71 18 (!) 99/58 97 %     Oxygen Therapy  O2 Sat (%): 98 % (05/27/21 1220)  Pulse via Oximetry: 76 beats per minute (05/25/21 1625)  O2 Device: None (Room air) (05/26/21 1908)    Estimated body mass index is 17.49 kg/m² as calculated from the following:    Height as of this encounter: 5' 8\" (1.727 m).     Weight as of this encounter: 52.2 kg (115 lb). Intake/Output Summary (Last 24 hours) at 5/27/2021 1448  Last data filed at 5/27/2021 6723  Gross per 24 hour   Intake 1485 ml   Output 1170 ml   Net 315 ml       *Note that automatically entered I/Os may not be accurate; dependent on patient compliance with collection and accurate  by techs. General:    Thin, chronically ill appearing. No overt distress  CV:   RRR. No m/r/g. No edema. No JVD  Lungs:   CTAB. No wheezing, rhonchi, or rales. Unlabored  Abdomen:   Soft, nontender, nondistended. Old surgical scars. Ostomy LLQ with brown stool  :  Bishop, small amt of dark urine  Extremities: Warm and dry. No cyanosis   Skin:     No rashes. Normal coloration  Neuro:  No gross focal deficits. I have reviewed all labs, meds, and other studies shown below:  Last 24hr Labs:  No results found for this or any previous visit (from the past 24 hour(s)).     All Micro Results     Procedure Component Value Units Date/Time    CULTURE, URINE [[id number]]  (Abnormal) Collected: 05/25/21 0820    Order Status: Completed Specimen: Clean catch Updated: 05/27/21 0710     Special Requests: NO SPECIAL REQUESTS        Culture result:       >100,000 COLONIES/mL YEAST IDENTIFICATION TO FOLLOW          CULTURE, BLOOD [[id number]] Collected: 05/25/21 1532    Order Status: Completed Specimen: Blood Updated: 05/27/21 0629     Special Requests: --        LEFT  ARM       Culture result: NO GROWTH 2 DAYS       CULTURE, BLOOD [[id number]] Collected: 05/25/21 1532    Order Status: Completed Specimen: Blood Updated: 05/27/21 0629     Special Requests: --        RIGHT  ARM       Culture result: NO GROWTH 2 DAYS             SARS-CoV-2 Lab Results  \"Novel Coronavirus\" Test: No results found for: COV2NT   \"Emergent Disease\" Test: No results found for: EDPR  \"SARS-COV-2\" Test: No results found for: XGCOVT  Rapid Test: No results found for: COVR         Current Meds:  Current Facility-Administered Medications   Medication Dose Route Frequency    polyethylene glycol (MIRALAX) packet 17 g  17 g Oral DAILY    polyethylene glycol (MIRALAX) packet 51 g  51 g Oral ONCE    amitriptyline (ELAVIL) tablet 100 mg  100 mg Oral QHS    cholecalciferol (VITAMIN D3) (1000 Units /25 mcg) tablet 2,000 Units  2,000 Units Oral DAILY    levothyroxine (SYNTHROID) tablet 125 mcg  125 mcg Oral 6am    OLANZapine (ZyPREXA) tablet 7.5 mg  7.5 mg Oral QHS    tamsulosin (FLOMAX) capsule 0.4 mg  0.4 mg Oral DAILY    mesalamine (CANASA) suppository 1,000 mg  1 Suppository Rectal BID    acetaminophen (TYLENOL) tablet 650 mg  650 mg Oral Q6H PRN    Or    acetaminophen (TYLENOL) suppository 650 mg  650 mg Rectal Q6H PRN    ondansetron (ZOFRAN) injection 4 mg  4 mg IntraVENous Q6H PRN    0.9% sodium chloride infusion  75 mL/hr IntraVENous CONTINUOUS    piperacillin-tazobactam (ZOSYN) 3.375 g in 0.9% sodium chloride (MBP/ADV) 100 mL MBP  3.375 g IntraVENous Q8H    clonazePAM (KlonoPIN) tablet 6 mg  6 mg Oral QHS       Other Studies:  No results found for this visit on 05/25/21. No results found.     Signed:  Brian Araujo MD

## 2021-05-27 NOTE — PROGRESS NOTES
Hourly rounds completed this shift. Patient required medication for leg pain X's 1 this shift. Resting well at present. Patient empties ostomy per self. Bishop to bedside bag draining well. Bed in low position and call light within reach of patient. Will report off to oncoming nurse.

## 2021-05-28 ENCOUNTER — APPOINTMENT (OUTPATIENT)
Dept: ULTRASOUND IMAGING | Age: 76
DRG: 698 | End: 2021-05-28
Attending: UROLOGY
Payer: MEDICARE

## 2021-05-28 LAB
ANION GAP SERPL CALC-SCNC: 7 MMOL/L (ref 7–16)
BACTERIA SPEC CULT: ABNORMAL
BUN SERPL-MCNC: 15 MG/DL (ref 8–23)
CALCIUM SERPL-MCNC: 7.9 MG/DL (ref 8.3–10.4)
CHLORIDE SERPL-SCNC: 111 MMOL/L (ref 98–107)
CO2 SERPL-SCNC: 25 MMOL/L (ref 21–32)
CREAT SERPL-MCNC: 1.12 MG/DL (ref 0.8–1.5)
GLUCOSE SERPL-MCNC: 147 MG/DL (ref 65–100)
POTASSIUM SERPL-SCNC: 3.8 MMOL/L (ref 3.5–5.1)
SERVICE CMNT-IMP: ABNORMAL
SODIUM SERPL-SCNC: 143 MMOL/L (ref 136–145)

## 2021-05-28 PROCEDURE — 74011250637 HC RX REV CODE- 250/637: Performed by: FAMILY MEDICINE

## 2021-05-28 PROCEDURE — 80048 BASIC METABOLIC PNL TOTAL CA: CPT

## 2021-05-28 PROCEDURE — 3331090002 HH PPS REVENUE DEBIT

## 2021-05-28 PROCEDURE — 36415 COLL VENOUS BLD VENIPUNCTURE: CPT

## 2021-05-28 PROCEDURE — 76770 US EXAM ABDO BACK WALL COMP: CPT

## 2021-05-28 PROCEDURE — 65270000029 HC RM PRIVATE

## 2021-05-28 PROCEDURE — 74011000258 HC RX REV CODE- 258: Performed by: FAMILY MEDICINE

## 2021-05-28 PROCEDURE — 74011250636 HC RX REV CODE- 250/636: Performed by: FAMILY MEDICINE

## 2021-05-28 PROCEDURE — 99232 SBSQ HOSP IP/OBS MODERATE 35: CPT | Performed by: UROLOGY

## 2021-05-28 PROCEDURE — 3331090001 HH PPS REVENUE CREDIT

## 2021-05-28 PROCEDURE — 99231 SBSQ HOSP IP/OBS SF/LOW 25: CPT | Performed by: SURGERY

## 2021-05-28 RX ADMIN — PIPERACILLIN SODIUM AND TAZOBACTAM SODIUM 3.38 G: 3; .375 INJECTION, POWDER, LYOPHILIZED, FOR SOLUTION INTRAVENOUS at 23:20

## 2021-05-28 RX ADMIN — MESALAMINE 1000 MG: 1000 SUPPOSITORY RECTAL at 17:56

## 2021-05-28 RX ADMIN — CLONAZEPAM 6 MG: 1 TABLET ORAL at 20:39

## 2021-05-28 RX ADMIN — PIPERACILLIN SODIUM AND TAZOBACTAM SODIUM 3.38 G: 3; .375 INJECTION, POWDER, LYOPHILIZED, FOR SOLUTION INTRAVENOUS at 08:10

## 2021-05-28 RX ADMIN — TAMSULOSIN HYDROCHLORIDE 0.4 MG: 0.4 CAPSULE ORAL at 08:12

## 2021-05-28 RX ADMIN — LEVOTHYROXINE SODIUM 125 MCG: 0.07 TABLET ORAL at 06:20

## 2021-05-28 RX ADMIN — OLANZAPINE 7.5 MG: 5 TABLET, FILM COATED ORAL at 21:07

## 2021-05-28 RX ADMIN — AMITRIPTYLINE HYDROCHLORIDE 100 MG: 50 TABLET, FILM COATED ORAL at 21:07

## 2021-05-28 RX ADMIN — PIPERACILLIN SODIUM AND TAZOBACTAM SODIUM 3.38 G: 3; .375 INJECTION, POWDER, LYOPHILIZED, FOR SOLUTION INTRAVENOUS at 17:00

## 2021-05-28 RX ADMIN — SODIUM CHLORIDE 75 ML/HR: 900 INJECTION, SOLUTION INTRAVENOUS at 17:56

## 2021-05-28 RX ADMIN — PIPERACILLIN SODIUM AND TAZOBACTAM SODIUM 3.38 G: 3; .375 INJECTION, POWDER, LYOPHILIZED, FOR SOLUTION INTRAVENOUS at 00:00

## 2021-05-28 RX ADMIN — VITAMIN D, TAB 1000IU (100/BT) 2000 UNITS: 25 TAB at 08:12

## 2021-05-28 RX ADMIN — ACETAMINOPHEN 650 MG: 325 TABLET ORAL at 17:00

## 2021-05-28 RX ADMIN — MESALAMINE 1000 MG: 1000 SUPPOSITORY RECTAL at 08:10

## 2021-05-28 RX ADMIN — SODIUM CHLORIDE 75 ML/HR: 900 INJECTION, SOLUTION INTRAVENOUS at 04:10

## 2021-05-28 NOTE — PROGRESS NOTES
Met with patient re: concern for transportation. Patient stated he is needing transportation to an outpatient appointment with GI - he stated he will still be in the hospital and will miss his appointment so he is wanting to be seen while he is here. Relayed info to attending physician. 15:08 update:  GI will not be consulted during this admission per report. Spoke with CM Supervisor and unable to offer patient transportation to his upcoming outpatient GI appt. Patient has medicare and is independent so medical transport not warranted. Attempted to relay information to patient but patient unavailable at this time. Spoke with patient's RN and explained. Will meet with patient to further discuss as time allows.

## 2021-05-28 NOTE — PROGRESS NOTES
Urology Progress Note    Admit Date: 5/25/2021    Subjective:     Patient has no new complaints. No flank pain. Brito catheter changed out yesterday for 18 Fr and patient has had better, clear urine output since the exchange. No abdominal pain. No hematuria. IR exchanged drain in pelvic fluid collection yesterday. Objective:     Patient Vitals for the past 8 hrs:   BP Temp Pulse Resp SpO2   05/28/21 0428 113/64 98 °F (36.7 °C) (!) 59 16 94 %   05/27/21 2307 117/65 97.9 °F (36.6 °C) (!) 59 18 96 %     05/27 1901 - 05/28 0700  In: -   Out: 1210 [Urine:1200; Drains:10]  05/26 0701 - 05/27 1900  In: 2631 [I.V.:1485]  Out: 9565 [Urine:1450; Drains:20]    Physical Exam:   Visit Vitals  /64 (BP 1 Location: Right upper arm, BP Patient Position: At rest)   Pulse (!) 59   Temp 98 °F (36.7 °C)   Resp 16   Ht 5' 8\" (1.727 m)   Wt 115 lb (52.2 kg)   SpO2 94%   BMI 17.49 kg/m²        GENERAL: No acute distress, Awake, Alert, Oriented X 3  CARDIAC: regular rate and rhythm  CHEST AND LUNG: Easy work of breathing  ABDOMEN: soft, non tender, non-distended, colostomy present, pelvic drain in place  : Brito catheter with clear yellow urine. Data Review No results found for this or any previous visit (from the past 24 hour(s)). Assessment:     Principal Problem:    Pelvic abscess in male [facility]) (5/13/2021)    Active Problems:    Rectal cancer ([facility] [address] (10/22/2013)      Hypothyroid (6/14/2019)      Overview: stable w/med      Major depression ()      75 y.o.  male with possible small distal R ureteral stone being managed conservatively, recurrent pelvic abscess, poorly draining brito catheter. General Surgery evaluated patient and felt colovesical fistula to be unlikely.     Plan:     -Continue brito catheter to drainage  -Continue flomax for conservative management of ureter stone  -Monitor I&Os  -Would repeat BMP and renal/bladder ultrasound today to ensure no worsening bladder distension/hydronephrosis from retention and/or stone.   -Follow up urine culture (prelim with yeast) and treat as indicated  -Will follow       Jack Matias M.D.     [facility]  [address] [place] [address] [address]  Phone: [phone]  Fax: [phone]

## 2021-05-28 NOTE — PROGRESS NOTES
Hospitalist Progress Note     Admit Date:  2021  7:38 AM   Name:  Katelyn Marroquin   Age:  66 y.o.  :  1945   MRN:  [id number]   PCP:  Allison Martinez DO  Presenting Complaint: Urinary Catheter Problem    Initial Admission Diagnosis: Pelvic abscess in [facility]) [K65.1]     Assessment and Plan:     Hospital Problems as of 2021 Date Reviewed: 12/10/2020        Codes Class Noted - Resolved POA    * (Principal) Pelvic abscess in [facility]) ICD-10-CM: K65.1  ICD-9-CM: 567.22  2021 - Present Unknown        Major depression ICD-10-CM: F32.9  ICD-9-CM: 296.20  Unknown - Present Yes        Hypothyroid ICD-10-CM: E03.9  ICD-9-CM: 244.9  2019 - Present Yes    Overview Signed 2019 12:12 AM by Ruddy Busch MD     stable w/med             Rectal cancer [facility]) ICD-10-CM: C20  ICD-9-CM: 154.1  10/22/2013 - Present Yes              Plan:  #Pelvic abscess with enteric fistula  Vancomycin discontinued on   Con't Zosyn  Uncertain significant of prostatic collection. Was on abx for pre-sacral abscess at during prior hospitalization and at discharge, not septic so not convinced that needs specific tx. Blood culture negative  Urine culture Candida albicans  IR consulted and patient had IR guided percutaneous drain exchange on   Surgery recommended no surgical intervention at this time and signed off    #Possible colovesical fistula  CT cystogram showed persistent distended bladder with no obvious colovesical fistula, possible small EP bladder perforation  Surgery consulted and recommend in the absence of proven colovesical fistula no acute intervention requires.  Hostile abdomen given numerous prior surgeries    #Neurogenic bladder with perforation/hydronephrosis:  Bishop catheter changed out  with better urine output  Plan to repeat renal/bladder ultrasound today to ensure no worsening of hydronephrosis  Urology is following, patient recommended    #History of rectal cancer:  No evidence of recurrence    #R UVJ kidney stone  Continue Flomax    # MDD/anxiety  Klonopin dosing verified on . Zyprexa. # Hypothyroidism  Synthroid      Other listed chronic conditions stable, continue current management. Discharge planning: Pending. Diet:  DIET REGULAR  DVT ppx: SCDs    Hospital Course:   Mr. Zoila Workman is a nice 65 y/o WM with a h/o rectal cancer s/p colostomy and numerous abdominal surgeries, recurrent SBO, MDD/anxiety, hypothyroidism who was recently hospitalized for pre-sacral abscess. Seen by General Surgery for recurrent SBO and he was managed conservatively. CT showed concern for recurrence of presacral tumor along with new hydronephrosis. Urology consulted and felt no intervention was warranted. IR was consulted and he had CT guided drain placement on 5/13. Drain replaced on 5/20. Cultures with E. Coli and enterococcus. Was discharged and took Augmentin. Returned to ER on 5/25 with c/o low urine output from his Bishop. Repeat CT showed interval development of retrovesicular prostatic fluid colleciton, questionable R UVJ stone. Ostomy fluid with elevated Cr. CT cystogram ordered to assess for fistula. IR and General surgery consulted. On antibiotics. 24hr Events/Subjective:   5/28: Patient seen at the bedside. Reports feeling okay. Bishop's changed out yesterday and draining well. Patient had IR guided percutaneous drain exchange yesterday. Patient is asking if we can consult gastroenterology as he has missed his scheduled appointment due to being in the hospital. I discussed with patient that currently no indication to call GI on board, patient to follow-up GI outpatient.     No other complaints  Objective:     Patient Vitals for the past 24 hrs:   Temp Pulse Resp BP SpO2   05/28/21 0743 99.1 °F (37.3 °C) 99 16 112/74 96 %   05/28/21 0428 98 °F (36.7 °C) (!) 59 16 113/64 94 %   05/27/21 2307 97.9 °F (36.6 °C) (!) 59 18 117/65 96 %   05/27/21 1927 97.5 °F (36.4 °C) 66 16 107/61 98 % 05/27/21 1724 97.8 °F (36.6 °C) 62 18 126/63 98 %   05/27/21 1451 97.7 °F (36.5 °C) (!) 59 20 (!) 154/71 99 %     Oxygen Therapy  O2 Sat (%): 96 % (05/28/21 0743)  Pulse via Oximetry: 76 beats per minute (05/25/21 1625)  O2 Device: None (Room air) (05/27/21 2307)    Estimated body mass index is 17.49 kg/m² as calculated from the following:    Height as of this encounter: 5' 8\" (1.727 m). Weight as of this encounter: 52.2 kg (115 lb). Intake/Output Summary (Last 24 hours) at 5/28/2021 1352  Last data filed at 5/28/2021 1122  Gross per 24 hour   Intake 400 ml   Output 2160 ml   Net -1760 ml       *Note that automatically entered I/Os may not be accurate; dependent on patient compliance with collection and accurate  by techs. General:    Thin, chronically ill appearing. No overt distress  CV:   RRR. No m/r/g. No edema. No JVD  Lungs:   CTAB. No wheezing, rhonchi, or rales. Unlabored  Abdomen:   Soft, nontender, nondistended. Old surgical scars. Ostomy LLQ with brown stool  :  Bishop, small amt of dark urine  Extremities: Warm and dry. No cyanosis   Skin:     No rashes. Normal coloration  Neuro:  No gross focal deficits.      I have reviewed all labs, meds, and other studies shown below:  Last 24hr Labs:  Recent Results (from the past 24 hour(s))   METABOLIC PANEL, BASIC    Collection Time: 05/28/21  8:08 AM   Result Value Ref Range    Sodium 143 136 - 145 mmol/L    Potassium 3.8 3.5 - 5.1 mmol/L    Chloride 111 (H) 98 - 107 mmol/L    CO2 25 21 - 32 mmol/L    Anion gap 7 7 - 16 mmol/L    Glucose 147 (H) 65 - 100 mg/dL    BUN 15 8 - 23 MG/DL    Creatinine 1.12 0.8 - 1.5 MG/DL    GFR est AA >60 >60 ml/min/1.73m2    GFR est non-AA >60 >60 ml/min/1.73m2    Calcium 7.9 (L) 8.3 - 10.4 MG/DL       All Micro Results     Procedure Component Value Units Date/Time    CULTURE, URINE [[id number]]  (Abnormal) Collected: 05/25/21 0802    Order Status: Completed Specimen: Clean catch Updated: 05/28/21 4339 Special Requests: NO SPECIAL REQUESTS        Culture result:       >100,000 COLONIES/mL CANDIDA ALBICANS          CULTURE, BLOOD [[id number]] Collected: 05/25/21 1532    Order Status: Completed Specimen: Blood Updated: 05/28/21 0624     Special Requests: --        RIGHT  ARM       Culture result: NO GROWTH 3 DAYS       CULTURE, BLOOD [[id number]] Collected: 05/25/21 1532    Order Status: Completed Specimen: Blood Updated: 05/28/21 0624     Special Requests: --        LEFT  ARM       Culture result: NO GROWTH 3 DAYS             SARS-CoV-2 Lab Results  \"Novel Coronavirus\" Test: No results found for: COV2NT   \"Emergent Disease\" Test: No results found for: EDPR  \"SARS-COV-2\" Test: No results found for: XGCOVT  Rapid Test: No results found for: COVR         Current Meds:  Current Facility-Administered Medications   Medication Dose Route Frequency    polyethylene glycol (MIRALAX) packet 17 g  17 g Oral DAILY    amitriptyline (ELAVIL) tablet 100 mg  100 mg Oral QHS    cholecalciferol (VITAMIN D3) (1000 Units /25 mcg) tablet 2,000 Units  2,000 Units Oral DAILY    levothyroxine (SYNTHROID) tablet 125 mcg  125 mcg Oral 6am    OLANZapine (ZyPREXA) tablet 7.5 mg  7.5 mg Oral QHS    tamsulosin (FLOMAX) capsule 0.4 mg  0.4 mg Oral DAILY    mesalamine (CANASA) suppository 1,000 mg  1 Suppository Rectal BID    acetaminophen (TYLENOL) tablet 650 mg  650 mg Oral Q6H PRN    Or    acetaminophen (TYLENOL) suppository 650 mg  650 mg Rectal Q6H PRN    ondansetron (ZOFRAN) injection 4 mg  4 mg IntraVENous Q6H PRN    0.9% sodium chloride infusion  75 mL/hr IntraVENous CONTINUOUS    piperacillin-tazobactam (ZOSYN) 3.375 g in 0.9% sodium chloride (MBP/ADV) 100 mL MBP  3.375 g IntraVENous Q8H    clonazePAM (KlonoPIN) tablet 6 mg  6 mg Oral QHS       Other Studies:  No results found for this visit on 05/25/21. IR CHANGE ABSCESS / CYST CATHETER    Result Date: 5/27/2021  Indication: History of pelvic abscess.  The current drain is not able to be flushed  Consent: Informed written and oral consent was obtained from the patient after explanation of benefits and risks of procedure (including, but not limited to: infection, hemorrhage, loss of access). The Patient's questions were answered to their satisfaction. The patient stated understanding and requested that we proceed. Procedure:  Maximal sterile barrier technique (including:  cap, mask, sterile gown, sterile gloves, sterile sheet, hand hygiene, and chlorhexidene for cutaneous antisepsis) were used. The skin around the pigtail  was prepped and draped in the standard fashion. Lidocaine was administered for local field block. Using fluoroscopic guidance, the indwelling catheter was removed over an Amplatz wire. A new catheter was positioned in the residual cavity adjacent to the bowel loop. .  Contrast was administered confirming appropriate position. The catheter was secured. A drainage bag was attached. Complications: None. Radiation Exposure Indices: Reference Air Kerma (Ka,r) = 55 mGy Dose Area Product/Kerma Area Product (DAP/JENA PIERRE/PKA) = 1169 cGy-cm2 Fluoroscopy Exposure Time = 3 minutes 42 seconds Contrast:  15 milliliters. Medications: None. Findings: The existing pigtail catheter is kinked. This catheter was removed over wire. The catheter was positioned adjacent to the bowel. 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 .     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 The existing catheter was kinked but is now positioned just adjacent to the bowel in the pelvis. Plan: The patient will recover for approximately 1 hour prior to discharge home. The patient should return in 2 weeks for evaluation. Note is made that the other collection in the pelvis is not amenable to percutaneous drainage as there is no percutaneous route. US RETROPERITONEUM COMP    Result Date: 5/28/2021  RENAL ULTRASOUND. HISTORY: Bladder distention and hydronephrosis.  COMPARISON: CT scan from 25 May 2021, 3 days prior. TECHNIQUE: Direct skin contact sector 3-5 MHz images of the kidneys are obtained. FINDINGS: Renal echogenicity within normal limits, right kidneys mildly hypoechoic compared to the liver Right kidney: 11.8 cm in length. Mild to moderate hydronephrosis. Echogenic shadowing stones. Proximal hydroureter. Left kidney: 11.3 cm in length. No hydronephrosis. Echogenic shadowing stones lower pole. Urinary bladder: Drained by Bishop catheter Vasculature: Aorta: Normal caliber. IVC:  Patent. Persistent right hydroureteronephrosis and hydroureter and bilateral kidney stones.        Signed:  Ade Lazo MD

## 2021-05-28 NOTE — PROGRESS NOTES
Yesterday's findings/procedures noted and reviewed. No problems documented overnight  Pt asleep, not awakened. resp-unlabored  CV-reg  abd is scaphoid and soft. Perc drain-fecal stained  Brito- cloudy yellow    Images of abscessogram and cystogram reviewed. IMP/PLAN:    H/o rectal cancer- no evidence of recurrence  Colostomy status- functioning  Neurogenic bladder with perforation- new brito appears to be draining; mgmt per urology  Hydronephrosis-likely secondary to pelvic abscess and small stone- per urology  Pelvic abscess w/enteric fistula- perc drain per IR    No surgical needs. Will be available if needed.

## 2021-05-29 LAB
ANION GAP SERPL CALC-SCNC: 4 MMOL/L (ref 7–16)
BASOPHILS # BLD: 0 K/UL (ref 0–0.2)
BASOPHILS NFR BLD: 1 % (ref 0–2)
BUN SERPL-MCNC: 14 MG/DL (ref 8–23)
CALCIUM SERPL-MCNC: 7.7 MG/DL (ref 8.3–10.4)
CHLORIDE SERPL-SCNC: 109 MMOL/L (ref 98–107)
CO2 SERPL-SCNC: 29 MMOL/L (ref 21–32)
CREAT SERPL-MCNC: 1.08 MG/DL (ref 0.8–1.5)
DIFFERENTIAL METHOD BLD: ABNORMAL
EOSINOPHIL # BLD: 0.1 K/UL (ref 0–0.8)
EOSINOPHIL NFR BLD: 3 % (ref 0.5–7.8)
ERYTHROCYTE [DISTWIDTH] IN BLOOD BY AUTOMATED COUNT: 14.9 % (ref 11.9–14.6)
GLUCOSE SERPL-MCNC: 116 MG/DL (ref 65–100)
HCT VFR BLD AUTO: 30 % (ref 41.1–50.3)
HGB BLD-MCNC: 9.2 G/DL (ref 13.6–17.2)
IMM GRANULOCYTES # BLD AUTO: 0 K/UL (ref 0–0.5)
IMM GRANULOCYTES NFR BLD AUTO: 0 % (ref 0–5)
LYMPHOCYTES # BLD: 0.3 K/UL (ref 0.5–4.6)
LYMPHOCYTES NFR BLD: 8 % (ref 13–44)
MCH RBC QN AUTO: 27.8 PG (ref 26.1–32.9)
MCHC RBC AUTO-ENTMCNC: 30.7 G/DL (ref 31.4–35)
MCV RBC AUTO: 90.6 FL (ref 79.6–97.8)
MONOCYTES # BLD: 0.3 K/UL (ref 0.1–1.3)
MONOCYTES NFR BLD: 8 % (ref 4–12)
NEUTS SEG # BLD: 3.2 K/UL (ref 1.7–8.2)
NEUTS SEG NFR BLD: 81 % (ref 43–78)
NRBC # BLD: 0 K/UL (ref 0–0.2)
PLATELET # BLD AUTO: 173 K/UL (ref 150–450)
PMV BLD AUTO: 9 FL (ref 9.4–12.3)
POTASSIUM SERPL-SCNC: 3.7 MMOL/L (ref 3.5–5.1)
RBC # BLD AUTO: 3.31 M/UL (ref 4.23–5.6)
SODIUM SERPL-SCNC: 142 MMOL/L (ref 138–145)
WBC # BLD AUTO: 4 K/UL (ref 4.3–11.1)

## 2021-05-29 PROCEDURE — 74011250637 HC RX REV CODE- 250/637: Performed by: FAMILY MEDICINE

## 2021-05-29 PROCEDURE — 74011250636 HC RX REV CODE- 250/636: Performed by: FAMILY MEDICINE

## 2021-05-29 PROCEDURE — 80048 BASIC METABOLIC PNL TOTAL CA: CPT

## 2021-05-29 PROCEDURE — 36415 COLL VENOUS BLD VENIPUNCTURE: CPT

## 2021-05-29 PROCEDURE — 3331090002 HH PPS REVENUE DEBIT

## 2021-05-29 PROCEDURE — 65270000029 HC RM PRIVATE

## 2021-05-29 PROCEDURE — 74011000258 HC RX REV CODE- 258: Performed by: FAMILY MEDICINE

## 2021-05-29 PROCEDURE — 85025 COMPLETE CBC W/AUTO DIFF WBC: CPT

## 2021-05-29 PROCEDURE — 3331090001 HH PPS REVENUE CREDIT

## 2021-05-29 RX ADMIN — TAMSULOSIN HYDROCHLORIDE 0.4 MG: 0.4 CAPSULE ORAL at 10:54

## 2021-05-29 RX ADMIN — PIPERACILLIN SODIUM AND TAZOBACTAM SODIUM 3.38 G: 3; .375 INJECTION, POWDER, LYOPHILIZED, FOR SOLUTION INTRAVENOUS at 23:43

## 2021-05-29 RX ADMIN — CLONAZEPAM 6 MG: 1 TABLET ORAL at 22:31

## 2021-05-29 RX ADMIN — PIPERACILLIN SODIUM AND TAZOBACTAM SODIUM 3.38 G: 3; .375 INJECTION, POWDER, LYOPHILIZED, FOR SOLUTION INTRAVENOUS at 10:54

## 2021-05-29 RX ADMIN — AMITRIPTYLINE HYDROCHLORIDE 100 MG: 50 TABLET, FILM COATED ORAL at 22:32

## 2021-05-29 RX ADMIN — VITAMIN D, TAB 1000IU (100/BT) 2000 UNITS: 25 TAB at 10:54

## 2021-05-29 RX ADMIN — OLANZAPINE 7.5 MG: 5 TABLET, FILM COATED ORAL at 22:31

## 2021-05-29 RX ADMIN — LEVOTHYROXINE SODIUM 125 MCG: 0.07 TABLET ORAL at 06:02

## 2021-05-29 RX ADMIN — PIPERACILLIN SODIUM AND TAZOBACTAM SODIUM 3.38 G: 3; .375 INJECTION, POWDER, LYOPHILIZED, FOR SOLUTION INTRAVENOUS at 17:56

## 2021-05-29 RX ADMIN — SODIUM CHLORIDE 75 ML/HR: 900 INJECTION, SOLUTION INTRAVENOUS at 06:02

## 2021-05-29 NOTE — CONSULTS
[facility] Consult Note       Primary GI Physician: Dr. Eloy Vargas    Referring Provider:  Dr. Esposito Samples Date:  5/29/2021    Admit Date:  5/25/2021    Chief Complaint:  Pelvic abscess and enteric fistula, hx of rectal cancer, Patient Requested Consult for increased rectal discharge    Subjective:     History of Present Illness:  Patient is a 66 y.o. male with PMH including but not limited to history of rectal cancer, colostomy and removal of sigmoid colon, anxiety, depression, hypothyroidism, diversion proctitis, multiple abdominal surgeries , who is seen in consultation at the request of Dr. Marvin Lara  for pelvic abscess and enteric fistula, hx of rectal cancer, patient requested consult for increased rectal discharge. Mr. Twyla Villa has a history of rectal cancer and has had  an extremely complicated course since diagnosis of rectal cancer in 2013 s/p LAR by Dr Leslie Whatley after neoadjuvant chemoradiation  with complete response, followed by definitive surgery and completion chemotherapy. Jennifer Garcia has subsequently had numerous surgical complications after stoma closure to include anastomotic leak(s), wound infections, hernia and mesh infection, and fistula formation. His most recent ex lap was 4/2019 for SBO. He was seen by Surgery during his most recent hospitalization 511-5/14/2021 for findings of a presacral tumor s/p IR drain for intrapelvic abscess; THERE WAS NO TUMOR. During that admission he had intrapelvic abscess drained by IR. Urine cultures grew out E-Coli and enterococcus faecalis. He presented to the ED on 5/25/2021 for concern of stool coming from brito bag and IR drain. He has been admitted with pelvic abscess with enteric fistula. Urology and Surgery are following him. IR replaced a kinked drain on 5/27/2021 to the pelvic fluid collection. He is receiving Vancomycin, and Zosyn. Blood cultures are negative.  CT cystogram showed persistent distended bladder with no obvious colovesical fistula, possible small EP bladder perforation. Surgery consulted and recommend in the absence of proven colovesical fistula no acute intervention required. Mr. Norma Galeano was last seen in our office on 4/28/2021 by Dr. Handy Pablo for complaints of fecal urgency and mucus in stool. He reported the need to wear diapers due to the discharge from his rectum. He was given a trial of mesalamine suppositories BID and stool studies were ordered but not turned in. He currently has a follow-up appt scheduled with Dr. Anna Thompson on 6/3 and he was concerned that he would still be in the hospital at that time and requested that we see him while he is admitted. Mr. Norma Galeano reports that the rectal discharge has changed from mucus to large liquid output. Denies any blood per rectum. He reports that when he feels the urge to urinate that he has large output of liquid from his rectum. Denies any current abdominal pain, nausea, or vomiting. Reports having a great appetite. Denies any dysphagia/odynophagia.         PMH:  Past Medical History:   Diagnosis Date    Acute deep vein thrombosis (DVT) of non-extremity vein 5/20/2019    Family history of malignant neoplasm of gastrointestinal tract     mother colon/ovarian cancer 67    Fecal incontinence     LAR syndrome    Former cigarette smoker     History of rectal cancer     Hypothyroid     stable w/med    Infection and inflammatory reaction due to other internal prosthetic devices, implants and grafts, subsequent encounter 2017    abd wound/mesh colostomy    Insomnia     takes meds    Major depression     Osteoarthritis, hand     Personal history of colonic polyps 9/2013    x 1    Personal history of malignant neoplasm of rectum, rectosigmoid junction, and anus 9/2013    Psychiatric disorder     anxiety       PSH:  Past Surgical History:   Procedure Laterality Date    COLONOSCOPY N/A 2/12/2018    COLONOSCOPY / BMI=21 performed by Mina Cancino MD at [address] [place] [address] N/A 3/5/2021    COLONOSCOPY/BMI 19 performed by Magdiel Smith MD at [address] [place] [address]  2/12/2018         COLONOSCOPY THRU STOMA,LESMADY RMVL W/SNARE  3/5/2021         ENDOSCOPY, COLON, DIAGNOSTIC  last 2/3/15    [facility]--no polyps--3 year recall    HX COLOSTOMY  5/11/16    HX GI  4/2016    Sacral nerve stimlator lead trial (unsucessful) and removal    HX HERNIA REPAIR  3/2015, 5/2016    HX HERNIA REPAIR      and Colostomy in May    HX OTHER SURGICAL  10/7/2013    [facility]---endorectal us    HX OTHER SURGICAL Bilateral     cataracts  2017    HX POLYPECTOMY      HX TOTAL COLECTOMY  11/2013    [facility]--lap LAR with coloproctostomy, mobilization splenic flexure, diverting loop ileostomy    HX TOTAL COLECTOMY Right 8/2014    for leak    HX VASCULAR ACCESS Left 4/14    single lumen port/subsequently removed    IR CHANGE ABSCESS / CYST CATHETER  5/27/2021    IR INJ ABS CYST VIA CATHETER / TUBE  5/20/2021    IR INSERT TUNL CVC W/O PORT OVER 5 YR  5/20/2019    IR REPLACE CVC TUNNELED W/O PORT  5/30/2019       Allergies:  No Known Allergies    Home Medications:  Prior to Admission medications    Medication Sig Start Date End Date Taking? Authorizing Provider   tamsulosin (FLOMAX) 0.4 mg capsule Take 1 Cap by mouth daily for 30 days. 5/16/21 6/15/21 Yes Margarita Prado MD   mesalamine (ROWASA) 4 gram/60 mL enema Insert 60 mL into rectum every other day. Indications: diversion proctitis  Patient taking differently: Insert 60 mL into rectum two (2) times a day. Indications: diversion proctitis 3/5/21  Yes Magdiel Smith MD   levothyroxine (SYNTHROID) 125 mcg tablet TAKE ONE TABLET BY MOUTH ONE TIME DAILY BEFORE BREAKFAST 12/10/20  Yes Roger Frederick DO   clonazePAM (KLONOPIN) 2 mg tablet Take 2 Tabs by mouth nightly. Max Daily Amount: 4 mg. Indications: takes for sleep 6/17/19  Yes Lavelle Chaudhry MD   amitriptyline (ELAVIL) 100 mg tablet Take 1 Tab by mouth nightly.  Dr Rakel Gavin psych Indications: takes for sleep 19  Yes Elise Mendoza MD   OLANZapine THE PAVILIION) 2.5 mg tablet Take 7.5 mg by mouth nightly. Indications: Takes 3 x 2.5 mg tabs   Yes Provider, Historical   Cholecalciferol, Vitamin D3, (VITAMIN D3) 2,000 unit cap capsule Take 2,000 Units by mouth two (2) times a day. 10/15/18  Yes Andree Jacob DO       Hospital Medications:  Current Facility-Administered Medications   Medication Dose Route Frequency    polyethylene glycol (MIRALAX) packet 17 g  17 g Oral DAILY    amitriptyline (ELAVIL) tablet 100 mg  100 mg Oral QHS    cholecalciferol (VITAMIN D3) (1000 Units /25 mcg) tablet 2,000 Units  2,000 Units Oral DAILY    levothyroxine (SYNTHROID) tablet 125 mcg  125 mcg Oral 6am    OLANZapine (ZyPREXA) tablet 7.5 mg  7.5 mg Oral QHS    tamsulosin (FLOMAX) capsule 0.4 mg  0.4 mg Oral DAILY    mesalamine (CANASA) suppository 1,000 mg  1 Suppository Rectal BID    acetaminophen (TYLENOL) tablet 650 mg  650 mg Oral Q6H PRN    Or    acetaminophen (TYLENOL) suppository 650 mg  650 mg Rectal Q6H PRN    ondansetron (ZOFRAN) injection 4 mg  4 mg IntraVENous Q6H PRN    0.9% sodium chloride infusion  75 mL/hr IntraVENous CONTINUOUS    piperacillin-tazobactam (ZOSYN) 3.375 g in 0.9% sodium chloride (MBP/ADV) 100 mL MBP  3.375 g IntraVENous Q8H    clonazePAM (KlonoPIN) tablet 6 mg  6 mg Oral QHS       Social History:  Social History     Tobacco Use    Smoking status: Former Smoker     Packs/day: 1.00     Years: 4.00     Pack years: 4.00     Types: Cigarettes     Quit date: 1963     Years since quittin.5    Smokeless tobacco: Never Used   Substance Use Topics    Alcohol use: Not Currently     Alcohol/week: 11.7 standard drinks     Types: 14 Cans of beer per week     Comment: advised stop drinking given issues       Pt denies any history of drug use, blood transfusions, or tattoos.     Family History:  Family History   Problem Relation Age of Onset    Cancer Mother colon and ovarian    Cancer Brother         prostate    Alcohol abuse Brother     Cancer Maternal Grandmother         bone    Alcohol abuse Father     Alcohol abuse Sister     Stroke Paternal Grandfather        Review of Systems:  A detailed 10 system ROS is obtained, with pertinent positives as listed above. All others are negative. Diet:  Regular    Objective:     Physical Exam:  Vitals:  Visit Vitals  /73 (BP 1 Location: Right upper arm)   Pulse 61   Temp 97.8 °F (36.6 °C)   Resp 16   Ht 5' 8\" (1.727 m)   Wt 52.2 kg (115 lb)   SpO2 97%   BMI 17.49 kg/m²     Gen:  Pt is alert, cooperative, no acute distress  Skin:  Extremities and face reveal no rashes. HEENT: Sclerae anicteric. Extra-occular muscles are intact. No oral ulcers. No abnormal pigmentation of the lips. The neck is supple. Cardiovascular: Regular rate and rhythm. No murmurs, gallops, or rubs. Respiratory:  Comfortable breathing with no accessory muscle use. Clear breath sounds anteriorly with no wheezes, rales, or rhonchi. GI:  Abdomen nondistended, soft, and nontender. Normal active bowel sounds. No enlargement of the liver or spleen. No masses palpable. Rectal:  Deferred  Musculoskeletal:  No pitting edema of the lower legs. Neurological:  Gross memory appears intact. Patient is alert and oriented. Psychiatric:  Mood appears appropriate with judgement intact. Lymphatic:  No cervical or supraclavicular adenopathy. Laboratory:    Recent Labs     05/28/21  0808      K 3.8   *   CO2 25   BUN 15   CREA 1.12   CA 7.9*   *      IR Procedure 5/27/2021  Findings: existing drain was kinked. Replaced with new 10 F drain. There is a fistula to the large bowel.   The other pelvic collection is not amenable to percutaneous drainage     Complications: None     Recommendations: external drainage      Follow Up: 2 weeks     Signed By: Neftaly Nelson MD      May 27, 2021      CT PELVIS WITHOUT CONTRAST ADMINISTRATION:     CLINICAL HISTORY:  Possible colovesical fistula in a 66year-old 2 presented  yesterday with no urine output from his indwelling catheter for 2 days status  post recent percutaneous catheter drainage of presacral abscess. History of  rectal cancer.     TECHNIQUE:  Without administration of oral, intravenous, rectal, or bladder  contrast, pelvis were scanned with spiral technique. Coronal and sagittal  reformats were produced. Requested CT cystogram could not be performed today  because of persistent distention of the urinary bladder with excreted  intravenous contrast material dimension yesterday despite the presence of a  Jeong catheter, which reportedly is not draining and apparently obstructed.     COMPARISON: Abdominopelvic CT with oral and intravenous contrast yesterday,  CT-guided pelvic abscess drain replacement of May 20, 2021, and earlier studies.     FINDINGS: The bladder is distended with excreted contrast material, and the tip  of the Jeong catheter abuts the dome. There is an adjacent extraperitoneal  contrast collection with similar attenuation just cephalad to the bladder with a  questionable focal communication with the bladder lumen seen just to the right  of the Jeong catheter tip at the dome of the bladder. There is no definite  communication with more dense residual oral contrast material in the adjacent  rectosigmoid.        IMPRESSION     JEONG CATHETER OBSTRUCTION WITH DISTENTION OF THE URINARY BLADDER WITH EXCRETED  INTRAVENOUS CONTRAST FROM CT YESTERDAY AND APPARENT FOCAL EXTRAPERITONEAL  RUPTURE AT THE DOME JUST TO THE RIGHT OF THE TIP OF THE JEONG CATHETER.  ------------------------------------------------------    FINDINGS:       Abdomen:  Lung bases: There is continued moderate elevation the left hemidiaphragm with  minimal adjacent compressive atelectasis.     Liver: Is mild hepatic steatosis.     Biliary: Unremarkable gallbladder.  No evidence of intra or extrahepatic biliary  dilatation.     Pancreas: Normal in size and contour without focal lesion.     Spleen: Normal in size and contour without focal lesion.     Adrenal glands: Normal in size without focal lesion.     Kidneys: There is slightly increasing mild hydronephrosis of the right renal  collecting system with stones layering in the posterior lower pole of the right  kidney. A prominent right extrarenal pelvis is seen with dilatation of the  ureter to the bladder and a possible distal right UVJ stone which measures  approximately 3 mm.     Bowel: Left lower quadrant ostomy. Sigmoid resection.     Retroperitoneum/vasculature: No evidence of significant adenopathy. Unremarkable  aorta and IVC.     Abdominal soft tissues: Unremarkable.     Osseous structures: No acute osseous abnormality.     Pelvis:  Bishop balloon in place with moderately distended bladder containing minimal air. Presacral drain remains in place. There is a developing small retrovesicular  prostatic fluid collection measuring 18 x 31 mm containing a punctate collection  of air  ------------------------------------------------    Title: Percutaneous abscess drain placement.     History: Inadvertent removal of a pelvic drain      Consent: Informed written and oral consent was obtained from the patient after  explanation of benefits and risks (including, but not limitted to: Infection,  Hemorrhage, Visceral Injury). The patient's questions were answered to their  satisfaction. The patient stated understanding and requested that we proceed.       Technique: All CT scans at this facility are performed using dose  reduction/dose modulation techniques, as appropriate the performed exam,  including the following:  Automated Exposure Control;  Adjustment of the mA  and/or kV according to patient size (this includes techniques or standardized  protocols for targeted exams where dose is matched to indication/reason for  exam); and Use of Iterative Reconstruction Technique.     Procedure:  Sterile barrier technique (including:  cap, mask, sterile gloves,  sterile sheet, hand hygiene, and chlorhexidene for cutaneous antisepsis) were  used. A a preliminary CT scan was performed with radio-opaque markers on the  skin. Following routine prep and drape of the pelvis, a local field block with  1% lidocaine was achieved. A needle was advanced into the abscess cavity.       The needle was exchanged over an Amplatz wire for a 10 [employer] multipurpose  drain. . The catheter was secured. A suction bulb was attached.        Specimen:  None.     Radiation Exposure Indices: Total Exam DLP = 121 mGy-cm     Medications: None.       IMPRESSION  Uncomplicated percutaneous abscess drain replacement into the pelvic  abscess.     Plan:  Suction bulb drainage. Flush every shift. Followup abscessogram in  Interventional Radiology in 1-2 weeks. -------------------------------------------------------------    Procedure in Detail:  Informed consent was obtained for the procedure. The patient was placed in the left lateral decubitus position and sedation was induced by anesthesia.         The scope was inserted into the rectum and advanced under direct vision to the a distance of 15 cm for rectal examination and then was withdrawn and inserted via the LUQ stoma and advanced 25cm to his ileocolostomy. The quality of the colonic preparation was adequate. A careful inspection was made as the colonoscope was withdrawn, including a retroflexed view of the rectum; findings and interventions are described below.   Appropriate photodocumentation was obtained.     Findings:   Rectum:   - Flat lesions:     - Colitis: moderate diversion colitis.  mid-rectal stricture required switch to gastroscope  Descending Colon:   - Protruding lesions:     -stomal margin Polyp(s) size 6 mm removed by polypectomy (snare cautery)  Terminal Ileum: normal mucosa of colon remnant and widely patent ileocolic anastomosis     Specimens: Specimens were collected and sent to pathology.        Complications: None; patient tolerated the procedure well. \     EBL - minimal     Recommendations:   - Await pathology. - Rx for mesalamine enema to pharmacy- start with 1/2 dose enema QOD. Signed By: Mike Reyes MD                        March 5, 2021             Assessment:     Principal Problem:    Pelvic abscess in male [facility]) (5/13/2021)    Active Problems:    Rectal cancer ([address] (10/22/2013)      Hypothyroid (6/14/2019)      Overview: stable w/med      Major depression ()      Patient is a 66 y.o. male with PMH including but not limited to history of rectal cancer, colostomy and removal of sigmoid colon, anxiety, depression, hypothyroidism, diversion proctitis, multiple abdominal surgeries , who is seen in consultation at the request of Dr. Iván Dneny  for pelvic abscess and enteric fistula, hx of rectal cancer, patient requested consult. He has been admitted with pelvic abscess with enteric fistula. Urology and Surgery are following him. IR replaced a kinked drain on 5/27/2021 to the pelvic fluid collection. He is receiving Vancomycin, and Zosyn. Blood cultures are negative. CT cystogram showed persistent distended bladder with no obvious colovesical fistula, possible small EP bladder perforation. Surgery consulted and recommend in the absence of proven colovesical fistula no acute intervention requires. He currently has a follow-up appt scheduled with Dr. Kalee Messer on 6/3 to discuss increased rectal discharge and he was concerned that he would still be in the hospital at that time and requested that we see him while he is admitted. He underwent Colonoscopy thru stoma 3/5/2021 with Dr. Tabitha Pereira with findings of: Findings:   Rectum:   - Flat lesions:     - Colitis: moderate diversion colitis.  mid-rectal stricture required switch to gastroscope;  Descending Colon:   - Protruding lesions:     -stomal margin Polyp(s) size 6 mm removed by polypectomy (snare cautery) ; Terminal Ileum: normal mucosa of colon remnant and widely patent ileocolic anastomosis    Plan:   Current treatment of pelvic abscess- per primary. Mesalamine suppositories BID has not helped the rectal discharge. Patient wishes to discontinue them. Will discuss other options with Dr. Lee Benitez. He was not able to retain mesalamine enemas. Bj Monique NP    Patient seen and examined. Agree with above. Abdomen with surgical scars and possible ventral hernia. Has LLQ ostomy with thick liquid stools. Discussed assessment and plans with patient. We will stop Canasa suppositories as he feel that it is responsible for the liquid drainage from his rectum. If drainage persists he will need further evaluation by Surgery or Urology including possible contrast study to identify or exclude a possible fistula that he suspects. Ofe Benitez MD

## 2021-05-29 NOTE — PROGRESS NOTES
Hospitalist Progress Note     Admit Date:  2021  7:38 AM   Name:  Jason Marroquin   Age:  66 y.o.  :  1945   MRN:  [id number]   PCP:  Heather Booker DO  Presenting Complaint: Urinary Catheter Problem    Initial Admission Diagnosis: Pelvic abscess in [facility]) [K65.1]     Assessment and Plan:     Hospital Problems as of 2021 Date Reviewed: 12/10/2020        Codes Class Noted - Resolved POA    * (Principal) Pelvic abscess in male [facility]) ICD-10-CM: K65.1  ICD-9-CM: 567.22  2021 - Present Unknown        Major depression ICD-10-CM: F32.9  ICD-9-CM: 296.20  Unknown - Present Yes        Hypothyroid ICD-10-CM: E03.9  ICD-9-CM: 244.9  2019 - Present Yes    Overview Signed 2019 12:12 AM by Linwood Anderson MD     stable w/med             Rectal cancer [facility]) ICD-10-CM: C20  ICD-9-CM: 154.1  10/22/2013 - Present Yes              Plan:  #Pelvic abscess with enteric fistula  Vancomycin discontinued on   Con't Zosyn  Uncertain significant of prostatic collection. Was on abx for pre-sacral abscess at during prior hospitalization and at discharge, not septic so not convinced that needs specific tx. Blood culture negative  Urine culture Candida albicans  IR consulted and patient had IR guided percutaneous drain exchange on   Surgery recommended no surgical intervention at this time and signed off    #Possible colovesical fistula  CT cystogram showed persistent distended bladder with no obvious colovesical fistula, possible small EP bladder perforation  Surgery consulted and recommend in the absence of proven colovesical fistula no acute intervention requires.  Hostile abdomen given numerous prior surgeries  GI consulted on patient request    #Neurogenic bladder with perforation/hydronephrosis:  Bishop catheter changed out  with better urine output  Renal ultrasound on  with persistent right hydroureteronephrosis and hydroureter  Urology is following, appreciate recommendation    #History of rectal cancer:  No evidence of recurrence    #R UVJ kidney stone  Continue Flomax    # MDD/anxiety  Klonopin dosing verified on . Zyprexa. # Hypothyroidism  Synthroid      Other listed chronic conditions stable, continue current management. Discharge planning: Pending. Diet:  DIET REGULAR  DVT ppx: SCDs    Hospital Course:   Mr. Tristin Park is a nice 67 y/o WM with a h/o rectal cancer s/p colostomy and numerous abdominal surgeries, recurrent SBO, MDD/anxiety, hypothyroidism who was recently hospitalized for pre-sacral abscess. Seen by General Surgery for recurrent SBO and he was managed conservatively. CT showed concern for recurrence of presacral tumor along with new hydronephrosis. Urology consulted and felt no intervention was warranted. IR was consulted and he had CT guided drain placement on 5/13. Drain replaced on 5/20. Cultures with E. Coli and enterococcus. Was discharged and took Augmentin. Returned to ER on 5/25 with c/o low urine output from his Bishop. Repeat CT showed interval development of retrovesicular prostatic fluid colleciton, questionable R UVJ stone. Ostomy fluid with elevated Cr. CT cystogram ordered to assess for fistula. IR and General surgery consulted. On antibiotics. 24hr Events/Subjective:   5/29: Patient is seen at the bedside. GI consulted on patient request.  Patient reports overall he is feeling okay. Denies chest pain, palpitation, nausea, vomiting or abdominal pain.     No other complaints  Objective:     Patient Vitals for the past 24 hrs:   Temp Pulse Resp BP SpO2   05/29/21 1101 98.5 °F (36.9 °C) 62 16 122/75 99 %   05/29/21 0730 97.8 °F (36.6 °C) 61 16 121/73 97 %   05/29/21 0304 97.9 °F (36.6 °C) 60 16 106/64 95 %   05/29/21 0026 98 °F (36.7 °C) 69 18 110/72 96 %   05/28/21 1911 98.1 °F (36.7 °C) 65 16 110/64 95 %   05/28/21 1635 97.7 °F (36.5 °C) 66 16 114/67 96 %     Oxygen Therapy  O2 Sat (%): 99 % (05/29/21 1101)  Pulse via Oximetry: 76 beats per minute (05/25/21 1625)  O2 Device: None (Room air) (05/28/21 1911)    Estimated body mass index is 17.49 kg/m² as calculated from the following:    Height as of this encounter: 5' 8\" (1.727 m). Weight as of this encounter: 52.2 kg (115 lb). Intake/Output Summary (Last 24 hours) at 5/29/2021 1323  Last data filed at 5/29/2021 0744  Gross per 24 hour   Intake 5 ml   Output 1180 ml   Net -1175 ml       *Note that automatically entered I/Os may not be accurate; dependent on patient compliance with collection and accurate  by [employer]. General:    Thin, chronically ill appearing. No overt distress  CV:   RRR. No m/r/g. No edema. No JVD  Lungs:   CTAB. No wheezing, rhonchi, or rales. Unlabored  Abdomen:   Soft, nontender, nondistended. Old surgical scars. Ostomy LLQ with brown stool  :  Bishop, small amt of dark urine  Extremities: Warm and dry. No cyanosis   Skin:     No rashes. Normal coloration  Neuro:  No gross focal deficits. I have reviewed all labs, meds, and other studies shown below:  Last 24hr Labs:  Recent Results (from the past 24 hour(s))   CBC WITH AUTOMATED DIFF    Collection Time: 05/29/21  9:55 AM   Result Value Ref Range    WBC 4.0 (L) 4.3 - 11.1 K/uL    RBC 3.31 (L) 4.23 - 5.6 M/uL    HGB 9.2 (L) 13.6 - 17.2 g/dL    HCT 30.0 (L) 41.1 - 50.3 %    MCV 90.6 79.6 - 97.8 FL    MCH 27.8 26.1 - 32.9 PG    MCHC 30.7 (L) 31.4 - 35.0 g/dL    RDW 14.9 (H) 11.9 - 14.6 %    PLATELET 636 822 - 878 K/uL    MPV 9.0 (L) 9.4 - 12.3 FL    ABSOLUTE NRBC 0.00 0.0 - 0.2 K/uL    DF AUTOMATED      NEUTROPHILS 81 (H) 43 - 78 %    LYMPHOCYTES 8 (L) 13 - 44 %    MONOCYTES 8 4.0 - 12.0 %    EOSINOPHILS 3 0.5 - 7.8 %    BASOPHILS 1 0.0 - 2.0 %    IMMATURE GRANULOCYTES 0 0.0 - 5.0 %    ABS. NEUTROPHILS 3.2 1.7 - 8.2 K/UL    ABS. LYMPHOCYTES 0.3 (L) 0.5 - 4.6 K/UL    ABS. MONOCYTES 0.3 0.1 - 1.3 K/UL    ABS. EOSINOPHILS 0.1 0.0 - 0.8 K/UL    ABS. BASOPHILS 0.0 0.0 - 0.2 K/UL    ABS. IMM. GRANS. 0.0 0.0 - 0.5 K/UL   METABOLIC PANEL, BASIC    Collection Time: 05/29/21  9:55 AM   Result Value Ref Range    Sodium 142 138 - 145 mmol/L    Potassium 3.7 3.5 - 5.1 mmol/L    Chloride 109 (H) 98 - 107 mmol/L    CO2 29 21 - 32 mmol/L    Anion gap 4 (L) 7 - 16 mmol/L    Glucose 116 (H) 65 - 100 mg/dL    BUN 14 8 - 23 MG/DL    Creatinine 1.08 0.8 - 1.5 MG/DL    GFR est AA >60 >60 ml/min/1.73m2    GFR est non-AA >60 >60 ml/min/1.73m2    Calcium 7.7 (L) 8.3 - 10.4 MG/DL       All Micro Results     Procedure Component Value Units Date/Time    CULTURE, BLOOD [[id number]] Collected: 05/25/21 1532    Order Status: Completed Specimen: Blood Updated: 05/29/21 1125     Special Requests: --        RIGHT  ARM       Culture result: NO GROWTH 4 DAYS       CULTURE, BLOOD [[id number]] Collected: 05/25/21 1532    Order Status: Completed Specimen: Blood Updated: 05/29/21 1125     Special Requests: --        LEFT  ARM       Culture result: NO GROWTH 4 DAYS       CULTURE, URINE [[id number]]  (Abnormal) Collected: 05/25/21 0820    Order Status: Completed Specimen: Clean catch Updated: 05/28/21 0713     Special Requests: NO SPECIAL REQUESTS        Culture result:       >100,000 COLONIES/mL CANDIDA ALBICANS                SARS-CoV-2 Lab Results  \"Novel Coronavirus\" Test: No results found for: COV2NT   \"Emergent Disease\" Test: No results found for: EDPR  \"SARS-COV-2\" Test: No results found for: XGCOVT  Rapid Test: No results found for: COVR         Current Meds:  Current Facility-Administered Medications   Medication Dose Route Frequency    polyethylene glycol (MIRALAX) packet 17 g  17 g Oral DAILY    amitriptyline (ELAVIL) tablet 100 mg  100 mg Oral QHS    cholecalciferol (VITAMIN D3) (1000 Units /25 mcg) tablet 2,000 Units  2,000 Units Oral DAILY    levothyroxine (SYNTHROID) tablet 125 mcg  125 mcg Oral 6am    OLANZapine (ZyPREXA) tablet 7.5 mg  7.5 mg Oral QHS    tamsulosin (FLOMAX) capsule 0.4 mg  0.4 mg Oral DAILY    mesalamine (CANASA) suppository 1,000 mg  1 Suppository Rectal BID    acetaminophen (TYLENOL) tablet 650 mg  650 mg Oral Q6H PRN    Or    acetaminophen (TYLENOL) suppository 650 mg  650 mg Rectal Q6H PRN    ondansetron (ZOFRAN) injection 4 mg  4 mg IntraVENous Q6H PRN    0.9% sodium chloride infusion  75 mL/hr IntraVENous CONTINUOUS    piperacillin-tazobactam (ZOSYN) 3.375 g in 0.9% sodium chloride (MBP/ADV) 100 mL MBP  3.375 g IntraVENous Q8H    clonazePAM (KlonoPIN) tablet 6 mg  6 mg Oral QHS       Other Studies:  No results found for this visit on 05/25/21. No results found.     Signed:  Bernadette Yuan MD

## 2021-05-30 LAB
BACTERIA SPEC CULT: NORMAL
BACTERIA SPEC CULT: NORMAL
SERVICE CMNT-IMP: NORMAL
SERVICE CMNT-IMP: NORMAL

## 2021-05-30 PROCEDURE — 3331090001 HH PPS REVENUE CREDIT

## 2021-05-30 PROCEDURE — 74011250637 HC RX REV CODE- 250/637: Performed by: FAMILY MEDICINE

## 2021-05-30 PROCEDURE — 99232 SBSQ HOSP IP/OBS MODERATE 35: CPT | Performed by: UROLOGY

## 2021-05-30 PROCEDURE — 74011250637 HC RX REV CODE- 250/637: Performed by: SURGERY

## 2021-05-30 PROCEDURE — 65270000029 HC RM PRIVATE

## 2021-05-30 PROCEDURE — 74011250636 HC RX REV CODE- 250/636: Performed by: FAMILY MEDICINE

## 2021-05-30 PROCEDURE — BT40ZZZ ULTRASONOGRAPHY OF BLADDER: ICD-10-PCS | Performed by: FAMILY MEDICINE

## 2021-05-30 PROCEDURE — 74011000258 HC RX REV CODE- 258: Performed by: FAMILY MEDICINE

## 2021-05-30 PROCEDURE — 51798 US URINE CAPACITY MEASURE: CPT

## 2021-05-30 PROCEDURE — 3331090002 HH PPS REVENUE DEBIT

## 2021-05-30 RX ADMIN — OLANZAPINE 7.5 MG: 5 TABLET, FILM COATED ORAL at 22:38

## 2021-05-30 RX ADMIN — PIPERACILLIN SODIUM AND TAZOBACTAM SODIUM 3.38 G: 3; .375 INJECTION, POWDER, LYOPHILIZED, FOR SOLUTION INTRAVENOUS at 23:46

## 2021-05-30 RX ADMIN — SODIUM CHLORIDE 75 ML/HR: 900 INJECTION, SOLUTION INTRAVENOUS at 00:10

## 2021-05-30 RX ADMIN — AMITRIPTYLINE HYDROCHLORIDE 100 MG: 50 TABLET, FILM COATED ORAL at 22:38

## 2021-05-30 RX ADMIN — VITAMIN D, TAB 1000IU (100/BT) 2000 UNITS: 25 TAB at 08:27

## 2021-05-30 RX ADMIN — PIPERACILLIN SODIUM AND TAZOBACTAM SODIUM 3.38 G: 3; .375 INJECTION, POWDER, LYOPHILIZED, FOR SOLUTION INTRAVENOUS at 08:26

## 2021-05-30 RX ADMIN — PIPERACILLIN SODIUM AND TAZOBACTAM SODIUM 3.38 G: 3; .375 INJECTION, POWDER, LYOPHILIZED, FOR SOLUTION INTRAVENOUS at 16:20

## 2021-05-30 RX ADMIN — TAMSULOSIN HYDROCHLORIDE 0.4 MG: 0.4 CAPSULE ORAL at 08:26

## 2021-05-30 RX ADMIN — POLYETHYLENE GLYCOL 3350 17 G: 17 POWDER, FOR SOLUTION ORAL at 08:26

## 2021-05-30 RX ADMIN — CLONAZEPAM 6 MG: 1 TABLET ORAL at 22:38

## 2021-05-30 RX ADMIN — LEVOTHYROXINE SODIUM 125 MCG: 0.07 TABLET ORAL at 05:46

## 2021-05-30 NOTE — PROGRESS NOTES
Problem: Falls - Risk of  Goal: *Absence of Falls  Description: Document Betzy Horne Fall Risk and appropriate interventions in the flowsheet. Outcome: Progressing Towards Goal  Note: Fall Risk Interventions:            Medication Interventions: Teach patient to arise slowly         History of Falls Interventions: Investigate reason for fall         Problem: Patient Education: Go to Patient Education Activity  Goal: Patient/Family Education  Outcome: Progressing Towards Goal     Problem: Patient Education: Go to Patient Education Activity  Goal: Patient/Family Education  Outcome: Progressing Towards Goal     Pt in no distress, no C/O pain/N/V/diarrhea, tolerating meds/diet, POC/safety reviewed w/pt, pt states he comprehends, VSS, brito/IV/IVF intact.

## 2021-05-30 NOTE — PROGRESS NOTES
Urology Progress Note    Admit Date: 5/25/2021    Subjective:     Patient has no new complaints. Bishop draining well. Renal US showed bladder decompression with persistent mild to moderate R hydro-ureteronephrosis likely from distal R ureteral stone and non-obstructing stones in R renal pelvis. No pain from stone currently Cr normal.  Afebrile. No leukocytosis   Pelvic abscess with drain in place. Objective:     Patient Vitals for the past 8 hrs:   BP Temp Pulse Resp SpO2   05/30/21 0723 128/81 98.2 °F (36.8 °C) 88 14 94 %   05/30/21 0343 116/66 98 °F (36.7 °C) 61 12 95 %     05/30 0701 - 05/30 1900  In: 455 [P.O.:237]  Out: -   05/28 1901 - 05/30 0700  In: 5   Out: 3010 [Urine:3000; Drains:10]    Physical Exam:   Visit Vitals  /81 (BP 1 Location: Left upper arm, BP Patient Position: At rest)   Pulse 88   Temp 98.2 °F (36.8 °C)   Resp 14   Ht 5' 8\" (1.727 m)   Wt 115 lb (52.2 kg)   SpO2 94%   BMI 17.49 kg/m²        GENERAL: No acute distress, Awake, Alert, Oriented X 3  CARDIAC: regular rate and rhythm  CHEST AND LUNG: Easy work of breathing  ABDOMEN: soft, non tender, non-distended, colostomy,  BACK: pelvic drain   : Bishop with clear yellow urine. Data Review   Recent Results (from the past 24 hour(s))   CBC WITH AUTOMATED DIFF    Collection Time: 05/29/21  9:55 AM   Result Value Ref Range    WBC 4.0 (L) 4.3 - 11.1 K/uL    RBC 3.31 (L) 4.23 - 5.6 M/uL    HGB 9.2 (L) 13.6 - 17.2 g/dL    HCT 30.0 (L) 41.1 - 50.3 %    MCV 90.6 79.6 - 97.8 FL    MCH 27.8 26.1 - 32.9 PG    MCHC 30.7 (L) 31.4 - 35.0 g/dL    RDW 14.9 (H) 11.9 - 14.6 %    PLATELET 165 271 - 659 K/uL    MPV 9.0 (L) 9.4 - 12.3 FL    ABSOLUTE NRBC 0.00 0.0 - 0.2 K/uL    DF AUTOMATED      NEUTROPHILS 81 (H) 43 - 78 %    LYMPHOCYTES 8 (L) 13 - 44 %    MONOCYTES 8 4.0 - 12.0 %    EOSINOPHILS 3 0.5 - 7.8 %    BASOPHILS 1 0.0 - 2.0 %    IMMATURE GRANULOCYTES 0 0.0 - 5.0 %    ABS. NEUTROPHILS 3.2 1.7 - 8.2 K/UL    ABS.  LYMPHOCYTES 0.3 (L) 0.5 - 4.6 K/UL    ABS. MONOCYTES 0.3 0.1 - 1.3 K/UL    ABS. EOSINOPHILS 0.1 0.0 - 0.8 K/UL    ABS. BASOPHILS 0.0 0.0 - 0.2 K/UL    ABS. IMM. GRANS. 0.0 0.0 - 0.5 K/UL   METABOLIC PANEL, BASIC    Collection Time: 05/29/21  9:55 AM   Result Value Ref Range    Sodium 142 138 - 145 mmol/L    Potassium 3.7 3.5 - 5.1 mmol/L    Chloride 109 (H) 98 - 107 mmol/L    CO2 29 21 - 32 mmol/L    Anion gap 4 (L) 7 - 16 mmol/L    Glucose 116 (H) 65 - 100 mg/dL    BUN 14 8 - 23 MG/DL    Creatinine 1.08 0.8 - 1.5 MG/DL    GFR est AA >60 >60 ml/min/1.73m2    GFR est non-AA >60 >60 ml/min/1.73m2    Calcium 7.7 (L) 8.3 - 10.4 MG/DL           Assessment:     Principal Problem:    Pelvic abscess in male [facility]) (5/13/2021)    Active Problems:    Rectal cancer ([facility] [address] (10/22/2013)      Hypothyroid (6/14/2019)      Overview: stable w/med      Major depression ()      66year old male with R distal ureteral stone, urinary retention, and pelvic abscess admitted for management. Plan:     -No surgical intervention at this time  -Continue flomax to facilitate stone passage. Will allow up to 4 weeks of conservative management for stone passage since patient is asymptomatic and not demonstrating signs of acute sepsis (fevers/leukocytosis)  -Continue catheter to drainage. Can consider removal and voiding trial next week when approaching discharge  -Pelvic drain and antibiotics per primary team/general surgery  -Dispo: Will follow       Jack Rodgers M.D.     [facility]  [place]  [address] [place] [address], [facility] [address]  Phone: [phone]  Fax: [phone]

## 2021-05-30 NOTE — PROGRESS NOTES
INITIAL SPIRITUAL ASSESSMENT     NOTED:    NO MAYRA PREFERENCE LISTED    FRIEND -  ASTON    DNR    NO ACP ON FILE    MULTIPLE HEALTH CONCERNS    ANXIETY    DEPRESSION    HIGH RISK FOR READMISSION X 12 MOS      WILL ASSESS HOW WE CAN BEST SERVE THIS FAMILY

## 2021-05-30 NOTE — PROGRESS NOTES
[facility] Progress / Sign-off Note         Admit Date:  5/25/2021    Today's Date:  5/30/2021    CC:  Rectal discharge, Pelvic abscess and enteric fistula, hx of rectal cancer    Subjective:     Patient concerned about how long he will need to keep the Bishop. He reports some mucus but no large liquid output from rectum and seems like he is improving after stopping the Canasa suppositories. Denies any abdominal pain, nausea or vomiting. Medications:   Current Facility-Administered Medications   Medication Dose Route Frequency    polyethylene glycol (MIRALAX) packet 17 g  17 g Oral DAILY    amitriptyline (ELAVIL) tablet 100 mg  100 mg Oral QHS    cholecalciferol (VITAMIN D3) (1000 Units /25 mcg) tablet 2,000 Units  2,000 Units Oral DAILY    levothyroxine (SYNTHROID) tablet 125 mcg  125 mcg Oral 6am    OLANZapine (ZyPREXA) tablet 7.5 mg  7.5 mg Oral QHS    tamsulosin (FLOMAX) capsule 0.4 mg  0.4 mg Oral DAILY    acetaminophen (TYLENOL) tablet 650 mg  650 mg Oral Q6H PRN    Or    acetaminophen (TYLENOL) suppository 650 mg  650 mg Rectal Q6H PRN    ondansetron (ZOFRAN) injection 4 mg  4 mg IntraVENous Q6H PRN    0.9% sodium chloride infusion  75 mL/hr IntraVENous CONTINUOUS    piperacillin-tazobactam (ZOSYN) 3.375 g in 0.9% sodium chloride (MBP/ADV) 100 mL MBP  3.375 g IntraVENous Q8H    clonazePAM (KlonoPIN) tablet 6 mg  6 mg Oral QHS       Review of Systems:  Limited ROS was obtained. No chest pain or SOB.     Diet:  regular    Objective:   Vitals:  Visit Vitals  /70 (BP 1 Location: Left upper arm, BP Patient Position: At rest)   Pulse 65   Temp 98.1 °F (36.7 °C)   Resp 18   Ht 5' 8\" (1.727 m)   Wt 52.2 kg (115 lb)   SpO2 97%   BMI 17.49 kg/m²     Intake/Output:  05/30 0701 - 05/30 1900  In: 237 [P.O.:237]  Out: 675 [Urine:625]  05/28 1901 - 05/30 0700  In: 5   Out: 3010 [Urine:3000; Drains:10]  Exam:  General appearance: alert, cooperative, no distress  Abdomen: soft, non-tender. Bowel sounds normal.   Surgical scars and ventral hernia. No masses, no organomegaly. LLQ ostomy with thick liquid stools. Indwelling Bishop. Extremities: no cyanosis or edema  Neuro:  alert and oriented    Data Review (Labs):    Recent Labs     05/29/21  0955 05/28/21  0808   WBC 4.0*  --    HGB 9.2*  --    HCT 30.0*  --      --    MCV 90.6  --     143   K 3.7 3.8   * 111*   CO2 29 25   BUN 14 15   CREA 1.08 1.12   CA 7.7* 7.9*   * 147*       Assessment:     Principal Problem:    Pelvic abscess in male [facility]) (5/13/2021)    Active Problems:    Rectal cancer ([facility] [address] (10/22/2013)      Hypothyroid (6/14/2019)      Overview: stable w/med      Major depression ()      Patient is a 66 y.o. male with PMH including but not limited to history of rectal cancer, colostomy and removal of sigmoid colon, anxiety, depression, hypothyroidism, diversion proctitis, multiple abdominal surgeries , who is seen in consultation at the request of Dr. Shadi Medel  for pelvic abscess and enteric fistula, hx of rectal cancer, patient requested consult.      He has been admitted with pelvic abscess with enteric fistula. Urology and Surgery are following him. IR replaced a kinked drain on 5/27/2021 to the pelvic fluid collection. He is receiving Vancomycin, and Zosyn. Blood cultures are negative. CT cystogram showed persistent distended bladder with no obvious colovesical fistula, possible small EP bladder perforation.  Surgery consulted and recommend in the absence of proven colovesical fistula no acute intervention requires.      He currently has a follow-up appt scheduled with Dr. Calin Crowe on 6/3 to discuss increased rectal discharge and he was concerned that he would still be in the hospital at that time and requested that we see him while he is admitted.     He underwent Colonoscopy thru stoma 3/5/2021 with Dr. Donte Vargas with findings of: Findings:   Rectum:   - Flat lesions:     - Colitis: moderate diversion colitis.  mid-rectal stricture required switch to gastroscope; Descending Colon:   - Protruding lesions:     -stomal margin Polyp(s) size 6 mm removed by polypectomy (snare cautery) ; Terminal Ileum: normal mucosa of colon remnant and widely patent ileocolic anastomosis     Plan:   Current treatment of pelvic abscess- per primary. Mesalamine suppositories BID has not helped the rectal discharge. Patient wishes to discontinue them. He was not able to retain mesalamine enemas. He reports decreased liquid discharge after stopping Canasa. If drainage recurs, he may need further evaluation for a possible fistula. May need Short chain fatty acid enemas for diversion proctocolitis. Will reschedule his follow up with Dr. Ned Mcnamara. Will sign off. Please call or re-consult as needed.      Ofe Moore MD

## 2021-05-30 NOTE — PROGRESS NOTES
Hospitalist Progress Note     Admit Date:  2021  7:38 AM   Name:  Channing Marroquin   Age:  66 y.o.  :  1945   MRN:  [id number]   PCP:  Mimi Corbin DO  Presenting Complaint: Urinary Catheter Problem    Initial Admission Diagnosis: Pelvic abscess in male [facility]) [K65.1]     Assessment and Plan:     Hospital Problems as of 2021 Date Reviewed: 12/10/2020        Codes Class Noted - Resolved POA    * (Principal) Pelvic abscess in male [facility]) ICD-10-CM: K65.1  ICD-9-CM: 567.22  2021 - Present Unknown        Major depression ICD-10-CM: F32.9  ICD-9-CM: 296.20  Unknown - Present Yes        Hypothyroid ICD-10-CM: E03.9  ICD-9-CM: 244.9  2019 - Present Yes    Overview Signed 2019 12:12 AM by Marty Bowser MD     stable w/med             Rectal cancer [facility]) ICD-10-CM: C20  ICD-9-CM: 154.1  10/22/2013 - Present Yes              Plan:  #Pelvic abscess with enteric fistula  Vancomycin discontinued on   Con't Zosyn  Uncertain significant of prostatic collection. Was on abx for pre-sacral abscess at during prior hospitalization and at discharge, not septic so not convinced that needs specific tx. Blood culture negative  Urine culture Candida albicans  IR consulted and patient had IR guided percutaneous drain exchange on   Surgery recommended no surgical intervention at this time and signed off    #Possible colovesical fistula  CT cystogram showed persistent distended bladder with no obvious colovesical fistula, possible small EP bladder perforation  Surgery consulted and recommend in the absence of proven colovesical fistula no acute intervention requires.  Hostile abdomen given numerous prior surgeries  GI consulted on patient request, GI recommend to follow-up with Dr. Sara Rosado as outpatient and signed off    #Neurogenic bladder with perforation/hydronephrosis:  Bishop catheter changed out  with better urine output  Renal ultrasound on  with persistent right hydroureteronephrosis and hydroureter  Urology is following, appreciate recommendation  Plan to discontinue Bishop's today and give a voiding trial    #History of rectal cancer:  No evidence of recurrence    #R UVJ kidney stone  Continue Flomax    # MDD/anxiety  Klonopin dosing verified on . Zyprexa. # Hypothyroidism  Synthroid      Other listed chronic conditions stable, continue current management. Discharge planning: Hopefully tomorrow  Diet:  DIET REGULAR  DVT ppx: SCDs    Hospital Course:   Mr. Miguel Arnett is a nice 67 y/o WM with a h/o rectal cancer s/p colostomy and numerous abdominal surgeries, recurrent SBO, MDD/anxiety, hypothyroidism who was recently hospitalized for pre-sacral abscess. Seen by General Surgery for recurrent SBO and he was managed conservatively. CT showed concern for recurrence of presacral tumor along with new hydronephrosis. Urology consulted and felt no intervention was warranted. IR was consulted and he had CT guided drain placement on 5/13. Drain replaced on 5/20. Cultures with E. Coli and enterococcus. Was discharged and took Augmentin. Returned to ER on 5/25 with c/o low urine output from his Bishop. Repeat CT showed interval development of retrovesicular prostatic fluid colleciton, questionable R UVJ stone. Ostomy fluid with elevated Cr. CT cystogram ordered to assess for fistula. IR and General surgery consulted. On antibiotics. 24hr Events/Subjective:   5/30: Patient is seen at the bedside. No active complaint. Bishop is draining. Denies chest pain, palpitation, nausea, vomiting or abdominal pain.   Plan to discontinue Bishop's today and give a  voiding trial.    No other complaints  Objective:     Patient Vitals for the past 24 hrs:   Temp Pulse Resp BP SpO2   05/30/21 1110 98.1 °F (36.7 °C) 65 18 128/70 97 %   05/30/21 0723 98.2 °F (36.8 °C) 88 14 128/81 94 %   05/30/21 0343 98 °F (36.7 °C) 61 12 116/66 95 %   05/29/21 2344 98 °F (36.7 °C) (!) 56 14 121/68 97 %   05/29/21 1930 98 °F (36.7 °C) (!) 58 14 112/69 97 %   05/29/21 1624 98 °F (36.7 °C) (!) 59 16 130/76 99 %     Oxygen Therapy  O2 Sat (%): 97 % (05/30/21 1110)  Pulse via Oximetry: 76 beats per minute (05/25/21 1625)  O2 Device: None (Room air) (05/30/21 1110)    Estimated body mass index is 17.49 kg/m² as calculated from the following:    Height as of this encounter: 5' 8\" (1.727 m). Weight as of this encounter: 52.2 kg (115 lb). Intake/Output Summary (Last 24 hours) at 5/30/2021 1304  Last data filed at 5/30/2021 1254  Gross per 24 hour   Intake 242 ml   Output 2965 ml   Net -2723 ml       *Note that automatically entered I/Os may not be accurate; dependent on patient compliance with collection and accurate  by techs. General:    Thin, chronically ill appearing. No overt distress  CV:   RRR. No m/r/g. No edema. No JVD  Lungs:   CTAB. No wheezing, rhonchi, or rales. Unlabored  Abdomen:   Soft, nontender, nondistended. Old surgical scars. Ostomy LLQ with brown stool  :  Bishop, small amt of dark urine  Extremities: Warm and dry. No cyanosis   Skin:     No rashes. Normal coloration  Neuro:  No gross focal deficits. I have reviewed all labs, meds, and other studies shown below:  Last 24hr Labs:  No results found for this or any previous visit (from the past 24 hour(s)).     All Micro Results     Procedure Component Value Units Date/Time    CULTURE, BLOOD [[id number]] Collected: 05/25/21 1532    Order Status: Completed Specimen: Blood Updated: 05/30/21 1123     Special Requests: --        LEFT  ARM       Culture result: NO GROWTH 5 DAYS       CULTURE, BLOOD [[id number]] Collected: 05/25/21 1532    Order Status: Completed Specimen: Blood Updated: 05/30/21 1123     Special Requests: --        RIGHT  ARM       Culture result: NO GROWTH 5 DAYS       CULTURE, URINE [[id number]]  (Abnormal) Collected: 05/25/21 0820    Order Status: Completed Specimen: Clean catch Updated: 05/28/21 7365     Special Requests: NO SPECIAL REQUESTS        Culture result:       >100,000 COLONIES/mL CANDIDA ALBICANS                SARS-CoV-2 Lab Results  \"Novel Coronavirus\" Test: No results found for: COV2NT   \"Emergent Disease\" Test: No results found for: EDPR  \"SARS-COV-2\" Test: No results found for: XGCOVT  Rapid Test: No results found for: COVR         Current Meds:  Current Facility-Administered Medications   Medication Dose Route Frequency    polyethylene glycol (MIRALAX) packet 17 g  17 g Oral DAILY    amitriptyline (ELAVIL) tablet 100 mg  100 mg Oral QHS    cholecalciferol (VITAMIN D3) (1000 Units /25 mcg) tablet 2,000 Units  2,000 Units Oral DAILY    levothyroxine (SYNTHROID) tablet 125 mcg  125 mcg Oral 6am    OLANZapine (ZyPREXA) tablet 7.5 mg  7.5 mg Oral QHS    tamsulosin (FLOMAX) capsule 0.4 mg  0.4 mg Oral DAILY    acetaminophen (TYLENOL) tablet 650 mg  650 mg Oral Q6H PRN    Or    acetaminophen (TYLENOL) suppository 650 mg  650 mg Rectal Q6H PRN    ondansetron (ZOFRAN) injection 4 mg  4 mg IntraVENous Q6H PRN    0.9% sodium chloride infusion  75 mL/hr IntraVENous CONTINUOUS    piperacillin-tazobactam (ZOSYN) 3.375 g in 0.9% sodium chloride (MBP/ADV) 100 mL MBP  3.375 g IntraVENous Q8H    clonazePAM (KlonoPIN) tablet 6 mg  6 mg Oral QHS       Other Studies:  No results found for this visit on 05/25/21. No results found.     Signed:  Velvet Suárez MD

## 2021-05-31 VITALS
SYSTOLIC BLOOD PRESSURE: 122 MMHG | WEIGHT: 115 LBS | OXYGEN SATURATION: 97 % | HEIGHT: 68 IN | TEMPERATURE: 98 F | HEART RATE: 88 BPM | BODY MASS INDEX: 17.43 KG/M2 | DIASTOLIC BLOOD PRESSURE: 78 MMHG | RESPIRATION RATE: 18 BRPM

## 2021-05-31 PROCEDURE — 74011250637 HC RX REV CODE- 250/637: Performed by: SURGERY

## 2021-05-31 PROCEDURE — 74011250637 HC RX REV CODE- 250/637: Performed by: FAMILY MEDICINE

## 2021-05-31 PROCEDURE — 3331090002 HH PPS REVENUE DEBIT

## 2021-05-31 PROCEDURE — 99232 SBSQ HOSP IP/OBS MODERATE 35: CPT | Performed by: UROLOGY

## 2021-05-31 PROCEDURE — 3331090001 HH PPS REVENUE CREDIT

## 2021-05-31 PROCEDURE — 74011000258 HC RX REV CODE- 258: Performed by: FAMILY MEDICINE

## 2021-05-31 PROCEDURE — 74011250636 HC RX REV CODE- 250/636: Performed by: FAMILY MEDICINE

## 2021-05-31 RX ORDER — TAMSULOSIN HYDROCHLORIDE 0.4 MG/1
0.4 CAPSULE ORAL DAILY
Qty: 30 CAPSULE | Refills: 1 | Status: SHIPPED | OUTPATIENT
Start: 2021-06-01 | End: 2021-07-01

## 2021-05-31 RX ADMIN — TAMSULOSIN HYDROCHLORIDE 0.4 MG: 0.4 CAPSULE ORAL at 08:11

## 2021-05-31 RX ADMIN — VITAMIN D, TAB 1000IU (100/BT) 2000 UNITS: 25 TAB at 08:11

## 2021-05-31 RX ADMIN — LEVOTHYROXINE SODIUM 125 MCG: 0.07 TABLET ORAL at 05:26

## 2021-05-31 RX ADMIN — SODIUM CHLORIDE 75 ML/HR: 900 INJECTION, SOLUTION INTRAVENOUS at 05:27

## 2021-05-31 NOTE — PROGRESS NOTES
Problem: Falls - Risk of  Goal: *Absence of Falls  Description: Document Nita Benton Fall Risk and appropriate interventions in the flowsheet.   Outcome: Progressing Towards Goal  Note: Fall Risk Interventions:            Medication Interventions: Bed/chair exit alarm, Patient to call before getting OOB         History of Falls Interventions: Bed/chair exit alarm         Problem: Patient Education: Go to Patient Education Activity  Goal: Patient/Family Education  Outcome: Progressing Towards Goal     Problem: Patient Education: Go to Patient Education Activity  Goal: Patient/Family Education  Outcome: Progressing Towards Goal - - -

## 2021-05-31 NOTE — PROGRESS NOTES
Patient discharged home at this time. A&ox4. No complaints of acute pain or discomfort. IV taken out and no tele on. Discharge instructions gone over with patient and flomax refill prescription sent to pharmacy. Dr. Sheri Dorantes stated that patient does not need to take augmentin anymore and can take probiotic if he would like. Patient knows how to keep brito catheter clean and empty ostomy. Home health agencies have been set up already [facility] the . Patient ambulating around the unit without difficulty. Safety precautions maintained. Discharged home at this time.

## 2021-05-31 NOTE — DISCHARGE SUMMARY
Hospitalist Discharge Summary     Admit Date:  2021  7:38 AM   DC note date: 2021  Name:  Sukhdeep Calderón   Age:  66 y.o.  :  1945   MRN:  [id number]   PCP:  Luz Marina River DO  Treatment Team: Attending Provider: Griselda Bernard MD; Consulting Provider: Radha Hemphill MD; Consulting Provider: Lauren Lew NP; Utilization Review: Mary Mejia RN; Surgeon: Leatha Celestin MD; Care Manager: Cleopatra Inman RN; Consulting Provider: Madelene Mohs, MD    Problem List for this Hospitalization:  Hospital Problems as of 2021 Date Reviewed: 12/10/2020        Codes Class Noted - Resolved POA    * (Principal) Pelvic abscess in male [facility]) ICD-10-CM: K65.1  ICD-9-CM: 567.22  2021 - Present Unknown        Major depression ICD-10-CM: F32.9  ICD-9-CM: 296.20  Unknown - Present Yes        Hypothyroid ICD-10-CM: E03.9  ICD-9-CM: 244.9  2019 - Present Yes    Overview Signed 2019 12:12 AM by Franklyn Diaz MD     stable w/med             Rectal cancer [facility]) ICD-10-CM: C20  ICD-9-CM: 154.1  10/22/2013 - Present Yes                Admission HPI from 2021:    \" Patient is a 66 y.o. male who presented to the ED for cc stool coming out of his Bishop bag and pre sacral drain. Hx of rectal cancer, colostomy and removal of sigmoid colon, anxiety, depression, hypothyroidism, diversion proctitis, multiple abdominal surgeries, and recent DC from our facility on 5/15 after he was diagnosed with small bowel obstruction and right pre-sacral tumor. Patient also had intrapelvic abscess which was drained by IR. Was on ceftriaxone and Flagyl as inpatient and switched to Ciprofloxacin, Flagyl to complete 2-week course of antibiotics. Urine cultures at that time were not final. Urine culture growing e coli and enterococcus faecalis.      Hospital Course:  Mr. Margy Myles is a nice 67 y/o WM with a h/o rectal cancer s/p colostomy and numerous abdominal surgeries, recurrent SBO, MDD/anxiety, hypothyroidism who was recently hospitalized for pre-sacral abscess and seen by General Surgery for recurrent SBO and he was managed conservatively. CT showed concern for recurrence of presacral tumor along with new hydronephrosis. Urology consulted and felt no intervention was warranted. IR was consulted and he had CT guided drain placement on 5/13. Drain replaced on 5/20. Cultures with E. Coli and enterococcus. Was discharged and completed Augmentin. Returned to ER on 5/25 with c/o low urine output from his Brito. Repeat CT showed interval development of retrovesicular prostatic fluid colleciton, questionable R UVJ stone. Ostomy fluid with elevated Cr. Started on IV antibiotics. CT urogram showed persistent distended bladder with no obvious colovesical fistula, possible small EP bladder perforation. Surgery consulted and recommend in the absence of proven colovesical fistula no acute intervention requires, also patient with hostile abdomen given numerous prior surgeries. Uncertain significance of prosthetic collection. While on antibiotic for presacral abscess during prior hospitalization and at discharge, not septic so not convinced that needs specific treatment. Vancomycin was discontinued on 5/26 but continued Zosyn during the hospital stay. Blood cultures negative, urine culture with Candida albicans. IR consulted and kinked drain was replaced on 5/27 to the pelvic fluid collection and recommend to follow-up outpatient in 2 weeks. Urology consulted and Brito catheter changed out on 5/27 with better urine output. Renal ultrasound on 5/29 with persistent right hydroureteronephrosis and hydroureter. Urology recommend to continue Flomax to facilitate stone passage, plan to allow up to 4 weeks of conservative management for stone passes as patient is asymptomatic and not demonstrating signs of acute sepsis. Patient to continue brito catheter to drainage given failed voiding trial on 5/30.   Patient to follow-up in about 1 week for office cystoscopy and CT urogram with urology to evaluate for stone passage/resolution of hydronephrosis and refractory urinary retention. GI consulted on patient request, GI recommend to follow-up with Dr. Sara Rosado as outpatient. Patient is asymptomatic, percutaneous drain and Bishop is draining. Patient is stable to discharge home with home health today with close follow-up with urology, IR and gastroenterology. Disposition: Home Health Care Svc  Activity: Activity as tolerated  Diet: DIET REGULAR  Code Status: DNR    Follow Up Orders: Follow-up Appointments   Procedures    FOLLOW UP VISIT Appointment in: 3 - 5 Days PCP Urology in 1 week IR in 2 weeks GI in 1-2 weeks     PCP  Urology in 1 week  IR in 2 weeks  GI in 1-2 weeks     Standing Status:   Standing     Number of Occurrences:   1     Order Specific Question:   Appointment in     Answer:   3 - 5 Days       Follow-up Information     Follow up With Specialties Details Why Contact Luis Corbin DO Internal Medicine   [address] [place] [address]  [phone]            Discharge meds at bottom of this note. Plan was discussed with patient. All questions answered. Patient was stable at time of discharge. Given instructions to call a physician or return if any concerns. Discharge summary and encounter summary was sent to PCP electronically via \"Comm Mgt\" link in [facility], if possible. Diagnostic Imaging/Tests:   IR CHANGE ABSCESS / CYST CATHETER    Result Date: 5/27/2021  Indication: History of pelvic abscess. The current drain is not able to be flushed  Consent: Informed written and oral consent was obtained from the patient after explanation of benefits and risks of procedure (including, but not limited to: infection, hemorrhage, loss of access). The Patient's questions were answered to their satisfaction. The patient stated understanding and requested that we proceed.   Procedure: Maximal sterile barrier technique (including:  cap, mask, sterile gown, sterile gloves, sterile sheet, hand hygiene, and chlorhexidene for cutaneous antisepsis) were used. The skin around the pigtail  was prepped and draped in the standard fashion. Lidocaine was administered for local field block. Using fluoroscopic guidance, the indwelling catheter was removed over an Amplatz wire. A new catheter was positioned in the residual cavity adjacent to the bowel loop. .  Contrast was administered confirming appropriate position. The catheter was secured. A drainage bag was attached. Complications: None. Radiation Exposure Indices: Reference Air Kerma (Ka,r) = 55 mGy Dose Area Product/Kerma Area Product (DAP/JEAN PIERRE/PKA) = 1169 cGy-cm2 Fluoroscopy Exposure Time = 3 minutes 42 seconds Contrast:  15 milliliters. Medications: None. Findings: The existing pigtail catheter is kinked. This catheter was removed over wire. The catheter was positioned adjacent to the bowel. 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 .     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 The existing catheter was kinked but is now positioned just adjacent to the bowel in the pelvis. Plan: The patient will recover for approximately 1 hour prior to discharge home. The patient should return in 2 weeks for evaluation. Note is made that the other collection in the pelvis is not amenable to percutaneous drainage as there is no percutaneous route. CT PELV WO CONT    Result Date: 5/26/2021  CT PELVIS WITHOUT CONTRAST ADMINISTRATION: CLINICAL HISTORY:  Possible colovesical fistula in a 66year-old 2 presented yesterday with no urine output from his indwelling catheter for 2 days status post recent percutaneous catheter drainage of presacral abscess. History of rectal cancer. TECHNIQUE:  Without administration of oral, intravenous, rectal, or bladder contrast, pelvis were scanned with spiral technique.   Coronal and sagittal reformats were produced. Requested CT cystogram could not be performed today because of persistent distention of the urinary bladder with excreted intravenous contrast material dimension yesterday despite the presence of a Jeong catheter, which reportedly is not draining and apparently obstructed. COMPARISON: Abdominopelvic CT with oral and intravenous contrast yesterday, CT-guided pelvic abscess drain replacement of May 20, 2021, and earlier studies. FINDINGS: The bladder is distended with excreted contrast material, and the tip of the Jeong catheter abuts the dome. There is an adjacent extraperitoneal contrast collection with similar attenuation just cephalad to the bladder with a questionable focal communication with the bladder lumen seen just to the right of the Jeong catheter tip at the dome of the bladder. There is no definite communication with more dense residual oral contrast material in the adjacent rectosigmoid. JEONG CATHETER OBSTRUCTION WITH DISTENTION OF THE URINARY BLADDER WITH EXCRETED INTRAVENOUS CONTRAST FROM CT YESTERDAY AND APPARENT FOCAL EXTRAPERITONEAL RUPTURE AT THE DOME JUST TO THE RIGHT OF THE TIP OF THE JEONG CATHETER. This case was reviewed in consultation with colleagues. CT ABD PELV W CONT    Result Date: 5/25/2021  Exam: CT ABD PELV W CONT on 5/25/2021 11:15 AM Clinical History: The Male patient is 66years old  presenting for c/o no urine output in his catheter x 2 days. Technique: Thin slice axial images were obtained through the abdomen and pelvis with intravenous and with oral contrast.  Coronal reformatted images were also provided for review. A total of 100 ml of Iopamidol (ISOVUE-370) 76 % contrast was administered intravenously. All CT scans at this facility are performed using dose reduction/dose modulation techniques, as appropriate the performed exam, including the following: Automated Exposure Control;  Adjustment of the mA and/or kV according to patient size (this includes techniques or standardized protocols for targeted exams where dose is matched to indication/reason for exam); and Use of Iterative Reconstruction Technique. Radiation Exposure Indices: Reference Air Kerma (Ka,r) = 603 mGy-cm COMPARISON:  Abdomen pelvis CT 5/11/2021. FINDINGS:  Abdomen: Lung bases: There is continued moderate elevation the left hemidiaphragm with minimal adjacent compressive atelectasis. Liver: Is mild hepatic steatosis. Biliary: Unremarkable gallbladder. No evidence of intra or extrahepatic biliary dilatation. Pancreas: Normal in size and contour without focal lesion. Spleen: Normal in size and contour without focal lesion. Adrenal glands: Normal in size without focal lesion. Kidneys: There is slightly increasing mild hydronephrosis of the right renal collecting system with stones layering in the posterior lower pole of the right kidney. A prominent right extrarenal pelvis is seen with dilatation of the ureter to the bladder and a possible distal right UVJ stone which measures approximately 3 mm. Bowel: Left lower quadrant ostomy. Sigmoid resection. Retroperitoneum/vasculature: No evidence of significant adenopathy. Unremarkable aorta and IVC. Abdominal soft tissues: Unremarkable. Osseous structures: No acute osseous abnormality. Pelvis: Bishop balloon in place with moderately distended bladder containing minimal air. Presacral drain remains in place. There is a developing small retrovesicular prostatic fluid collection measuring 18 x 31 mm containing a punctate collection of air     1. Developing retrovesicular prostatic fluid collection containing punctate air concerning for abscess 2. Moderate distention of bladder with Bishop balloon in place. 3. Presacral drain in place with previous sigmoid resection. 4. Increasing dilatation right renal collecting system with layering stones in the right lower renal pole prominent extrarenal pelvis as well as ureteral dilatation and questionable right UVJ stone. CPT code(s): 21206, [id number]    CT ABD PELV W CONT    Result Date: 5/11/2021  CT ABDOMEN AND PELVIS WITH CONTRAST HISTORY: Restaging rectal carcinoma COMPARISON: 8/96/6586 TECHNIQUE: Helical imaging was performed from the lung bases through the ischial tuberosities during the intravenous infusion of 100 cc of Isovue-370. Oral contrast was administered. Coronal and sagittal reformats were performed. Dose reduction techniques used: Automated exposure control, adjustment of the mAs and/or kVp according to patient's size, and iterative reconstruction techniques. FINDINGS: *  LUNG BASES: Coronary artery disease. *  LIVER: Within normal limits. *  GALLBLADDER AND BILE DUCTS: Normal. Mild intrahepatic biliary ductal dilatation. *  SPLEEN: Within normal limits. *  URINARY TRACT: Left renal atrophy. Bilateral hydronephrosis of uncertain etiology. *  ADRENALS: Within normal limits. *  PANCREAS: Dilatation of the pancreatic duct. *  GASTROINTESTINAL TRACT: Low anterior colon resection. New enhancing mass with necrotic center to the right of the rectum in the presacral space measuring 3.4 x 2.0 cm. Small bowel obstruction containing fecal lies material. *  RETROPERITONEUM: Within normal limits. *  PERITONEAL CAVITY AND ABDOMINAL WALL: Within normal limits. *  PELVIS: Within normal limits. *  SPINE / BONES: Within normal limits. *  OTHER COMMENTS: None. 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 Consider obtaining a PET/CT scan. Small bowel obstruction containing fecal-like material. This is worse when compared to 8/31/2020. Bilateral hydronephrosis of uncertain etiology. Was not present on the prior study. Dilatation of the intrahepatic biliary ducts and pancreatic duct. This is more prominent on the study secondary to the use of intravenous contrast. Correlation with LFTs is recommended. Date of Dictation: 5/11/2021 10:10 AM      RETROPERITONEUM COMP    Result Date: 5/28/2021  RENAL ULTRASOUND.  HISTORY: Bladder distention and hydronephrosis. COMPARISON: CT scan from 25 May 2021, 3 days prior. TECHNIQUE: Direct skin contact sector 3-5 MHz images of the kidneys are obtained. FINDINGS: Renal echogenicity within normal limits, right kidneys mildly hypoechoic compared to the liver Right kidney: 11.8 cm in length. Mild to moderate hydronephrosis. Echogenic shadowing stones. Proximal hydroureter. Left kidney: 11.3 cm in length. No hydronephrosis. Echogenic shadowing stones lower pole. Urinary bladder: Drained by Bishop catheter Vasculature: Aorta: Normal caliber. IVC:  Patent. Persistent right hydroureteronephrosis and hydroureter and bilateral kidney stones. IR INJ ABS CYST VIA CATHETER / TUBE    Result Date: 5/20/2021  Indication: History of pelvic abscess status post drainage. However, there is leaking about the side and the drain has possibly partially been removed  Consent: Informed written and oral consent was obtained from the patient after explanation of benefits and risks of procedure (including, but not limited to: infection, hemorrhage, loss of access). The Patient's questions were answered to their satisfaction. The patient stated understanding and requested that we proceed. Procedure:  Maximal sterile barrier technique (including:  cap, mask, sterile gown, sterile gloves, sterile sheet, hand hygiene, and chlorhexidene for cutaneous antisepsis) were used. The skin around the pigtail  was prepped and draped in the standard fashion. Lidocaine was administered for local field block. Contrast injection failed to demonstrate a fistulous tract. A wire was placed through the catheter and the catheter was removed. Using a guiding catheter, attempts were made at placing the drain back through the existing site however I was unsuccessful. Therefore, the procedure was terminated. . Complications: None.  Radiation Exposure Indices: Reference Air Kerma (Ka,r) = 40 mGy Dose Area Product/Kerma Area Product (DAP/JEAN PIERRE/PKA) = 576 cGy-cm2 Fluoroscopy Exposure Time = 4 minutes 48 seconds Contrast:  10 milliliters. Medications: None. 1. Existing drain has nearly completely pulled out. Line 2. Unsuccessful attempts at placing the wire through the existing tract. The patient will proceed to CT for drain placement    CT GUIDE CATH/PERC DRAIN PERITON/RETROPERI FLUID W SI    Result Date: 5/20/2021  Title: Percutaneous abscess drain placement. History: Inadvertent removal of a pelvic drain Consent: Informed written and oral consent was obtained from the patient after explanation of benefits and risks (including, but not limitted to: Infection, Hemorrhage, Visceral Injury). The patient's questions were answered to their satisfaction. The patient stated understanding and requested that we proceed. Technique: All CT scans at this facility are performed using dose reduction/dose modulation techniques, as appropriate the performed exam, including the following:  Automated Exposure Control; Adjustment of the mA and/or kV according to patient size (this includes techniques or standardized protocols for targeted exams where dose is matched to indication/reason for exam); and Use of Iterative Reconstruction Technique. Procedure:  Sterile barrier technique (including:  cap, mask, sterile gloves, sterile sheet, hand hygiene, and chlorhexidene for cutaneous antisepsis) were used. A a preliminary CT scan was performed with radio-opaque markers on the skin. Following routine prep and drape of the pelvis, a local field block with 1% lidocaine was achieved. A needle was advanced into the abscess cavity. The needle was exchanged over an Amplatz wire for a 10 Western Negra multipurpose drain. . The catheter was secured. A suction bulb was attached. Specimen:  None. Radiation Exposure Indices: Total Exam DLP = 121 mGy-cm Medications: None. Uncomplicated percutaneous abscess drain replacement into the pelvic abscess.  Plan:  Suction bulb drainage. Flush every shift. Followup abscessogram in Interventional Radiology in 1-2 weeks. CT GUIDE CATH/PERC DRAIN PERITON/RETROPERI FLUID W SI    Result Date: 5/13/2021  Title: CT guided percutaneous abscess drain placement. Indication: Rectal carcinoma Consent: Informed written and oral consent was obtained from the patient after explanation of benefits and risks (including, but not limited to: Infection, Hemorrhage, Visceral Injury). The patient's questions were answered to their satisfaction. The patient stated understanding and requested that we proceed. Technique: All CT scans at this facility are performed using dose reduction/dose modulation techniques, as appropriate the performed exam, including the following:  Automated Exposure Control; Adjustment of the mA and/or kV according to patient size (this includes techniques or standardized protocols for targeted exams where dose is matched to indication/reason for exam); and Use of Iterative Reconstruction Technique. Procedure:  Sterile barrier technique (including:  cap, mask, sterile gloves, sterile sheet, hand hygiene, and chlorhexidene for cutaneous antisepsis) were used. With the patient prone a preliminary CT scan through the pelvis was performed with radio-opaque markers on the skin. Following routine prep and drape of the gluteal region, a local field block with 1% lidocaine was achieved. Using intermittent CT technique, a Yueh needle was advanced into the abscess cavity. Purulent fluid was returned and sent as specimen. Using intermittent CT technique, the needle was exchanged over an Amplatz wire for a 8 Western Negra multipurpose drain. A total of 3 cc of fluid was removed. The catheter was secured with a suture and StatLok device. A suction bulb was attached. A final CT scan was performed confirming appropriate drain location. The dominant portion of the abscess is cephalad to the current drain.  If this is not draining well, it may need to be repositioned under fluoroscopy a follow-up. Specimen:  Sent to Cardiovascular Simulation. Complications: None. Contrast: None. Radiation Exposure Indices: Total Exam DLP = 431 mGy-cm Medications:  Under physician supervision, 21 minutes of moderate intravenous conscious sedation was performed by administering Versed, Fentanyl intravenously. Continuous pulse oximetry, heart rate, and blood pressure monitoring was performed with an independent, trained observer present. Findings: Presacral pelvic abscess which tracks cephalad centrally within the pelvis. The dominant portion measures approximately 3 x 4 cm in diameter. The dominant portion is not amenable to direct access with CT given the surrounding structures. Uncomplicated CT-guided abscess drain placement, 8 Western Negra, into a presacral abscess. The dominant portion of the abscess centrally in the pelvis is not amenable to direct access. If this portion does not drain well, he will need drain repositioning under fluoroscopic guidance at follow-up. Defer to oncology for imaging follow-up to exclude recurrent malignancy. Echocardiogram results:  No results found for this visit on 05/25/21.     Procedures done this admission:  * No surgery found *    All Micro Results     Procedure Component Value Units Date/Time    CULTURE, BLOOD [[id number]] Collected: 05/25/21 1532    Order Status: Completed Specimen: Blood Updated: 05/30/21 1123     Special Requests: --        LEFT  ARM       Culture result: NO GROWTH 5 DAYS       CULTURE, BLOOD [[id number]] Collected: 05/25/21 1532    Order Status: Completed Specimen: Blood Updated: 05/30/21 1123     Special Requests: --        RIGHT  ARM       Culture result: NO GROWTH 5 DAYS       CULTURE, URINE [[id number]]  (Abnormal) Collected: 05/25/21 0820    Order Status: Completed Specimen: Clean catch Updated: 05/28/21 0713     Special Requests: NO SPECIAL REQUESTS        Culture result:       >100,000 COLONIES/mL [facility] SARS-CoV-2 Lab Results  \"Novel Coronavirus\" Test: No results found for: COV2NT   \"Emergent Disease\" Test: No results found for: EDPR  \"SARS-COV-2\" Test: No results found for: XGCOVT  Rapid Test: No results found for: COVR         Labs: Results:       BMP, Mg, Phos Recent Labs     05/29/21  0955      K 3.7   *   CO2 29   AGAP 4*   BUN 14   CREA 1.08   CA 7.7*   *      CBC Recent Labs     05/29/21  0955   WBC 4.0*   RBC 3.31*   HGB 9.2*   HCT 30.0*      GRANS 81*   LYMPH 8*   EOS 3   MONOS 8   BASOS 1   IG 0   ANEU 3.2   ABL 0.3*   ELIAN 0.1   ABM 0.3   ABB 0.0   AIG 0.0      LFT No results for input(s): ALT, TBIL, AP, TP, ALB, GLOB, AGRAT in the last 72 hours.     No lab exists for component: SGOT, GPT   Cardiac Testing Lab Results   Component Value Date/Time     05/22/2016 11:15 AM    CK - MB 2.0 05/22/2016 11:15 AM    CK-MB Index 2.0 05/22/2016 11:15 AM    Troponin-I, Qt. <0.04 05/22/2016 11:15 AM      Coagulation Tests Lab Results   Component Value Date/Time    Prothrombin time 15.0 (H) 05/20/2019 05:03 AM    Prothrombin time 11.1 05/22/2016 11:15 AM    Prothrombin time 12.0 (H) 08/24/2014 10:20 PM    INR 1.2 05/20/2019 05:03 AM    INR 1.1 05/22/2016 11:15 AM    INR 1.1 08/24/2014 10:20 PM    aPTT 38.8 05/21/2019 05:47 AM    aPTT 42.4 (H) 05/20/2019 10:07 PM    aPTT 40.9 (H) 05/20/2019 05:12 PM      A1c No results found for: HBA1C, [id number]   Lipid Panel Lab Results   Component Value Date/Time    Cholesterol, total 145 12/03/2020 08:31 AM    HDL Cholesterol 67 12/03/2020 08:31 AM    LDL, calculated 61 12/03/2020 08:31 AM    LDL, calculated 76 12/05/2019 08:30 AM    VLDL, calculated 17 12/03/2020 08:31 AM    VLDL, calculated 18 12/05/2019 08:30 AM    Triglyceride 90 12/03/2020 08:31 AM    CHOL/HDL Ratio 4.2 05/20/2019 05:03 AM      Thyroid Panel Lab Results   Component Value Date/Time    TSH 3.600 12/03/2020 08:31 AM    TSH 1.340 12/05/2019 08:30 AM    TSH 1.420 05/20/2019 05:03 AM    TSH 3.160 04/13/2019 05:58 AM    T4, Total 9.1 02/28/2017 02:31 PM    T4, Total 9.6 09/02/2016 09:58 AM        Most Recent UA Lab Results   Component Value Date/Time    Color YELLOW 05/25/2021 09:11 AM    Appearance TURBID 05/25/2021 09:11 AM    Specific gravity 1.017 06/13/2019 06:28 PM    pH (UA) 6.0 05/25/2021 09:11 AM    Protein TRACE (A) 05/25/2021 09:11 AM    Glucose Negative 05/25/2021 09:11 AM    Ketone Negative 05/25/2021 09:11 AM    Bilirubin Negative 05/25/2021 09:11 AM    Blood LARGE (A) 05/25/2021 09:11 AM    Urobilinogen 0.2 05/25/2021 09:11 AM    Nitrites Positive (A) 05/25/2021 09:11 AM    Leukocyte Esterase LARGE (A) 05/25/2021 09:11 AM    WBC >100 05/25/2021 09:11 AM    RBC 20-50 05/25/2021 09:11 AM    Epithelial cells 0-3 05/25/2021 09:11 AM    Bacteria 2+ (H) 05/25/2021 09:11 AM    Casts 0 05/25/2021 09:11 AM    Crystals, urine 0 05/25/2021 09:11 AM    Mucus 1+ (H) 05/25/2021 09:11 AM    Other observations RESULTS VERIFIED MANUALLY 05/25/2021 09:11 AM        No Known Allergies  Immunization History   Administered Date(s) Administered    COVID-19, PFIZER, MRNA, LNP-S, PF, 30MCG/0.3ML DOSE 02/15/2021, 03/08/2021    TB Skin Test (PPD) Intradermal 01/28/2014, 08/24/2014, 09/09/2014, 11/19/2015, 05/23/2016, 03/25/2019, 04/23/2019       All Labs from Last 24 Hrs:  No results found for this or any previous visit (from the past 24 hour(s)).     Discharge Exam:  Patient Vitals for the past 24 hrs:   Temp Pulse Resp BP SpO2   05/31/21 0747 98.2 °F (36.8 °C) 86 18 129/75 96 %   05/31/21 0400 98.5 °F (36.9 °C) 71 16 127/70 96 %   05/31/21 0000 98.4 °F (36.9 °C) 65 17 131/66 96 %   05/30/21 1925 98 °F (36.7 °C) 76 17 122/84 96 %   05/30/21 1613 97.9 °F (36.6 °C) 62 16 136/75 100 %   05/30/21 1110 98.1 °F (36.7 °C) 65 18 128/70 97 %     Oxygen Therapy  O2 Sat (%): 96 % (05/31/21 0747)  Pulse via Oximetry: 76 beats per minute (05/25/21 1625)  O2 Device: None (Room air) (05/31/21 0747)    Estimated body mass index is 17.49 kg/m² as calculated from the following:    Height as of this encounter: 5' 8\" (1.727 m). Weight as of this encounter: 52.2 kg (115 lb). Intake/Output Summary (Last 24 hours) at 5/31/2021 1004  Last data filed at 5/31/2021 0545  Gross per 24 hour   Intake 5 ml   Output 2065 ml   Net -2060 ml       *Note that automatically entered I/Os may not be accurate; dependent on patient compliance with collection and accurate  by assistants. General:    Well nourished. Alert. Eyes:   Normal sclerae. Extraocular movements intact. ENT:  Normocephalic, atraumatic. Moist mucous membranes  CV:   Regular rate and rhythm. No murmur, rub, or gallop. Lungs:  Clear to auscultation bilaterally. No wheezing, rhonchi, or rales. Abdomen: Soft, nontender, nondistended. Old surgical scars. Ostomy LLQ with brown stool  :                  Bishop  Extremities: Warm and dry. No cyanosis or edema. Neurologic: CN II-XII grossly intact. No gross focal deficits   Skin:     No rashes or jaundice. Psych:  Normal mood and affect.     Current Med List in Hospital:   Current Facility-Administered Medications   Medication Dose Route Frequency    polyethylene glycol (MIRALAX) packet 17 g  17 g Oral DAILY    amitriptyline (ELAVIL) tablet 100 mg  100 mg Oral QHS    cholecalciferol (VITAMIN D3) (1000 Units /25 mcg) tablet 2,000 Units  2,000 Units Oral DAILY    levothyroxine (SYNTHROID) tablet 125 mcg  125 mcg Oral 6am    OLANZapine (ZyPREXA) tablet 7.5 mg  7.5 mg Oral QHS    tamsulosin (FLOMAX) capsule 0.4 mg  0.4 mg Oral DAILY    acetaminophen (TYLENOL) tablet 650 mg  650 mg Oral Q6H PRN    Or    acetaminophen (TYLENOL) suppository 650 mg  650 mg Rectal Q6H PRN    ondansetron (ZOFRAN) injection 4 mg  4 mg IntraVENous Q6H PRN    0.9% sodium chloride infusion  75 mL/hr IntraVENous CONTINUOUS    piperacillin-tazobactam (ZOSYN) 3.375 g in 0.9% sodium chloride (MBP/ADV) 100 mL MBP  3.375 g IntraVENous Q8H    clonazePAM (KlonoPIN) tablet 6 mg  6 mg Oral QHS       Discharge Info:   Current Discharge Medication List      CONTINUE these medications which have CHANGED    Details   tamsulosin (FLOMAX) 0.4 mg capsule Take 1 Capsule by mouth daily for 30 days. Qty: 30 Capsule, Refills: 1  Start date: 6/1/2021, End date: 7/1/2021         CONTINUE these medications which have NOT CHANGED    Details   levothyroxine (SYNTHROID) 125 mcg tablet TAKE ONE TABLET BY MOUTH ONE TIME DAILY BEFORE BREAKFAST  Qty: 90 Tab, Refills: 3    Associated Diagnoses: Acquired hypothyroidism      clonazePAM (KLONOPIN) 2 mg tablet Take 2 Tabs by mouth nightly. Max Daily Amount: 4 mg. Indications: takes for sleep  Qty: 15 Tab, Refills: 0    Associated Diagnoses: Primary insomnia      amitriptyline (ELAVIL) 100 mg tablet Take 1 Tab by mouth nightly. Dr Dariana Wylie psych  Indications: takes for sleep  Qty: 15 Tab, Refills: 0      OLANZapine (ZYPREXA) 2.5 mg tablet Take 7.5 mg by mouth nightly. Indications: Takes 3 x 2.5 mg tabs      Cholecalciferol, Vitamin D3, (VITAMIN D3) 2,000 unit cap capsule Take 2,000 Units by mouth two (2) times a day. Qty: 180 Cap, Refills: 3         STOP taking these medications       mesalamine (ROWASA) 4 gram/60 mL enema Comments:   Reason for Stopping:                 Time spent in patient discharge planning and coordination 38 minutes.     Signed:  Óscar Patino MD

## 2021-05-31 NOTE — PROGRESS NOTES
Urology Progress Note    Admit Date: 5/25/2021    Subjective:     Patient has no new complaints. Bishop removed yesterday for voiding trial, but unfortunately, patient failed and catheter had to be replaced. Has not passed stone to his knowledge. No pain from stone currently Cr normal.  Afebrile. No leukocytosis   Pelvic abscess with drain in place. Objective:     Patient Vitals for the past 8 hrs:   BP Temp Pulse Resp SpO2   05/31/21 0400 127/70 98.5 °F (36.9 °C) 71 16 96 %   05/31/21 0000 131/66 98.4 °F (36.9 °C) 65 17 96 %     No intake/output data recorded. 05/29 1901 - 05/31 0700  In: 56 [P.O.:237]  Out: 4290 [Urine:4200; Drains:10]    Physical Exam:   Visit Vitals  /70 (BP 1 Location: Right upper arm, BP Patient Position: At rest;Lying)   Pulse 71   Temp 98.5 °F (36.9 °C)   Resp 16   Ht 5' 8\" (1.727 m)   Wt 115 lb (52.2 kg)   SpO2 96%   BMI 17.49 kg/m²        GENERAL: No acute distress, Awake, Alert, Oriented X 3  CARDIAC: regular rate and rhythm  CHEST AND LUNG: Easy work of breathing  ABDOMEN: soft, non tender, non-distended, colostomy,  BACK: pelvic drain   : Bishop with clear yellow urine. Data Review   No results found for this or any previous visit (from the past 24 hour(s)). Assessment:     Principal Problem:    Pelvic abscess in male [facility]) (5/13/2021)    Active Problems:    Rectal cancer ([facility] [address] (10/22/2013)      Hypothyroid (6/14/2019)      Overview: stable w/med      Major depression ()      66year old male with R distal ureteral stone, urinary retention, and pelvic abscess admitted for management. Plan:     -No surgical intervention at this time  -Continue flomax to facilitate stone passage.   Will allow up to 4 weeks of conservative management for stone passage since patient is asymptomatic and not demonstrating signs of acute sepsis (fevers/leukocytosis)  -Continue catheter to drainage given failed voiding trial yesterday  -Pelvic drain and antibiotics per primary team/general surgery  -Dispo: Will arrange follow up in about 1 week for office cystoscopy and CT urogram to evaluate for stone passage/resolution of hydronephrosis and refractory urinary retention. My office will call patient to schedule    Will sign off. Call with questions. Jack Glasgow M.D.     [facility]  [place]  [address] [place] [address], [facility] [address]  Phone: [phone]  Fax: [phone]

## 2021-05-31 NOTE — PROGRESS NOTES
Problem: Falls - Risk of  Goal: *Absence of Falls  Description: Document Yane Alvarez Fall Risk and appropriate interventions in the flowsheet.   5/31/2021 1022 by Carlos Montenegro RN  Outcome: Resolved/Met  Note: Fall Risk Interventions:            Medication Interventions: Bed/chair exit alarm, Patient to call before getting OOB         History of Falls Interventions: Bed/chair exit alarm      5/31/2021 0930 by Carlos Montenegro RN  Outcome: Progressing Towards Goal  Note: Fall Risk Interventions:            Medication Interventions: Bed/chair exit alarm, Patient to call before getting OOB         History of Falls Interventions: Bed/chair exit alarm         Problem: Patient Education: Go to Patient Education Activity  Goal: Patient/Family Education  5/31/2021 1022 by Carlos Montenegro RN  Outcome: Resolved/Met  5/31/2021 0930 by Carlos Montenegro RN  Outcome: Progressing Towards Goal     Problem: Patient Education: Go to Patient Education Activity  Goal: Patient/Family Education  5/31/2021 1022 by Carlos Montenegro RN  Outcome: Resolved/Met  5/31/2021 0930 by Carlos Montenegro RN  Outcome: Progressing Towards Goal

## 2021-05-31 NOTE — PROGRESS NOTES
Pt is for discharge home today with Resumption of home health services  Home Health: [facility] (resume) RN/PT/Aid  No further needs/supportive care orders received for CM at this time. Pt will have close follow up with PCP  Referral sent to [facility] for resumption of services    Milestones met    Care Management Interventions  PCP Verified by CM:  Yes  Mode of Transport at Discharge: Self  Transition of Care Consult (CM Consult): [address]: Yes  Discharge Durable Medical Equipment: No  Physical Therapy Consult: No  Occupational Therapy Consult: No  Speech Therapy Consult: No  Current Support Network: Own Home, Lives Alone  Confirm Follow Up Transport: Family  The Plan for Transition of Care is Related to the Following Treatment Goals : ongoing therapy needs  The Patient and/or Patient Representative was Provided with a Choice of Provider and Agrees with the Discharge Plan?: Yes  Name of the Patient Representative Who was Provided with a Choice of Provider and Agrees with the Discharge Plan: patient  Freedom of Choice List was Provided with Basic Dialogue that Supports the Patient's Individualized Plan of Care/Goals, Treatment Preferences and Shares the Quality Data Associated with the Providers?: Yes   Resource Information Provided?: No  Discharge Location  Discharge Placement: Home with home health (Resumption with services [facility] SN/PT/Aid)

## 2021-06-01 ENCOUNTER — HOME CARE VISIT (OUTPATIENT)
Dept: SCHEDULING | Facility: HOME HEALTH | Age: 76
End: 2021-06-01
Payer: MEDICARE

## 2021-06-01 PROCEDURE — 3331090002 HH PPS REVENUE DEBIT

## 2021-06-01 PROCEDURE — G0299 HHS/HOSPICE OF RN EA 15 MIN: HCPCS

## 2021-06-01 PROCEDURE — 3331090001 HH PPS REVENUE CREDIT

## 2021-06-02 ENCOUNTER — HOME CARE VISIT (OUTPATIENT)
Dept: SCHEDULING | Facility: HOME HEALTH | Age: 76
End: 2021-06-02
Payer: MEDICARE

## 2021-06-02 VITALS
TEMPERATURE: 98.3 F | OXYGEN SATURATION: 97 % | HEIGHT: 68 IN | BODY MASS INDEX: 17.43 KG/M2 | DIASTOLIC BLOOD PRESSURE: 64 MMHG | HEART RATE: 86 BPM | RESPIRATION RATE: 16 BRPM | SYSTOLIC BLOOD PRESSURE: 113 MMHG | WEIGHT: 115 LBS

## 2021-06-02 VITALS
RESPIRATION RATE: 18 BRPM | DIASTOLIC BLOOD PRESSURE: 62 MMHG | TEMPERATURE: 97.8 F | HEART RATE: 75 BPM | SYSTOLIC BLOOD PRESSURE: 116 MMHG | OXYGEN SATURATION: 98 %

## 2021-06-02 PROCEDURE — G0151 HHCP-SERV OF PT,EA 15 MIN: HCPCS

## 2021-06-02 PROCEDURE — 3331090001 HH PPS REVENUE CREDIT

## 2021-06-02 PROCEDURE — 3331090002 HH PPS REVENUE DEBIT

## 2021-06-03 PROCEDURE — 3331090002 HH PPS REVENUE DEBIT

## 2021-06-03 PROCEDURE — 3331090001 HH PPS REVENUE CREDIT

## 2021-06-04 ENCOUNTER — HOSPITAL ENCOUNTER (OUTPATIENT)
Dept: INTERVENTIONAL RADIOLOGY/VASCULAR | Age: 76
Discharge: HOME | End: 2021-06-04
Attending: RADIOLOGY
Payer: MEDICARE

## 2021-06-04 VITALS — DIASTOLIC BLOOD PRESSURE: 74 MMHG | HEART RATE: 97 BPM | SYSTOLIC BLOOD PRESSURE: 119 MMHG | OXYGEN SATURATION: 96 %

## 2021-06-04 DIAGNOSIS — L02.91 ABSCESS: ICD-10-CM

## 2021-06-04 PROCEDURE — 49424 ASSESS CYST CONTRAST INJECT: CPT

## 2021-06-04 PROCEDURE — 3331090001 HH PPS REVENUE CREDIT

## 2021-06-04 PROCEDURE — 3331090002 HH PPS REVENUE DEBIT

## 2021-06-04 PROCEDURE — 77030027478 HC TBNG JP W BLB MRTM -B

## 2021-06-04 PROCEDURE — 74011000636 HC RX REV CODE- 636: Performed by: RADIOLOGY

## 2021-06-04 RX ADMIN — IOPAMIDOL 10 ML: 612 INJECTION, SOLUTION INTRAVENOUS at 11:54

## 2021-06-04 NOTE — DISCHARGE INSTRUCTIONS
[address]  Department of Interventional Radiology  [facility]  [phone] Office  [phone] Fax    GENERAL DRAIN DISCHARGE INSTRUCTIONS    General Information:     A plastic catheter has been inserted through your skin and into area that needs to be drained. Your doctor ordered this procedure to be done in the event of an abscess, or other fluid collection in your body. You will notice a drainage bag attached to the catheter. When the fluid has all been removed, your doctor will send you back to us for the removal of the catheter. If you are going home with the catheter in place, your doctor may order a home health nurse to come to your house and assist with the care of the catheter. Your doctor will most likely order antibiotic tablets for you to take while you have this tube. Home Care Instructions: You can resume your regular diet and medication regimen. Do not drink alcohol, drive, or make any important legal decisions in the next 24 hours. Do not lift anything heavier than a gallon of milk, or do anything strenuous for the next 24 hours. You should not shower for 24 hours. You should cover the tube with plastic wrap and tape to keep it dry when you shower. You can disconnect the drainage bag while showering. The home health nurse or the nurse who discharges from the hospital will teach you how to do this. Do not take a bath, swim or immerse yourself in water as long as you have this drainage tube. You should clean the tube and change the dressing every  48 hours and as needed. Keep the dressing clean and dry. Keep up with how much drainage you get from the tube and report this to your doctor on each visit. Call If:     You should call your Physician and/or the Radiology Nurse if you have any bleeding other than a small spot on your bandage.  Call if you have any signs of infection, fever, or increased pain at the site of the tube. Call if you should have leakage around the tube, an increased yellowing of the skin, increased pain in the abdomen, a change in the amount or appearance of the fluid, or if the tube gets pulled out some or all the way out. DRAIN/PORT/CATHETER REMOVAL  DISCHARGE INSTRUCTIONS    General Information:     Your doctor has ordered for us to remove your drain, port, or catheter. This could be that you do not need it anymore, it is not doing its job, your physician has decided on another plan for your treatment and/or it may need replacing. Home Care Instructions: You can resume your regular diet and medication regimen. Do not drink alcohol, drive, or make any important legal decisions in the next 24 hours. Do not lift anything heavier than a gallon of milk, or do anything strenuous for the next 24 hours. You will notice a dressing over the site of the removal. This dressing should stay in place until the site is healed. The dressing should be changed at least every 48 hours. You should change the dressing sooner if it becomes soiled or wet. The nurse who discharges you to home should review with you any wound care instructions. Resume your normal level of activity slowly. You may shower after 24 hours, but do not take a bath, swim or immerse yourself in water until the site has healed, and keep the dressing dry with plastic wrap while showering. The site may ooze for a couple of days. This drainage should lessen with each passing day. Call If:     You should call your Physician and/or the Radiology Nurse if you have any bleeding other than a small spot on your bandage. Call if you have any signs of infection, fever, or increased pain at the site of the tube. Call if the oozing increases, if it changes color, or begins to have an odor. Follow-Up Instructions: Please see your ordering doctor as he/she has requested. To Reach Us:   If you have any questions about your procedure, please call the Interventional Radiology department at [phone]. After business hours (5pm) and weekends, call the answering service at [phone] and ask for the Radiologist on call to be paged. Si tiene Preguntas acerca del procedimiento, por favor llame al departamento de Radiología Intervencional al [phone]. Después de horas de oficina (5 pm) y los fines de [place], llamar al Alli Stafford al [phone] y pregunte por el Radiologo de Tamra Otero. Interventional Radiology General Nurse Discharge    After general anesthesia or intravenous sedation, for 24 hours or while taking prescription Narcotics:  · Limit your activities  · Do not drive and operate hazardous machinery  · Do not make important personal or business decisions  · Do  not drink alcoholic beverages  · If you have not urinated within 8 hours after discharge, please contact your surgeon on call. * Please give a list of your current medications to your Primary Care Provider. * Please update this list whenever your medications are discontinued, doses are     changed, or new medications (including over-the-counter products) are added. * Please carry medication information at all times in case of emergency situations. These are general instructions for a healthy lifestyle:    No smoking/ No tobacco products/ Avoid exposure to second hand smoke  Surgeon General's Warning:  Quitting smoking now greatly reduces serious risk to your health. Obesity, smoking, and sedentary lifestyle greatly increases your risk for illness  A healthy diet, regular physical exercise & weight monitoring are important for maintaining a healthy lifestyle    You may be retaining fluid if you have a history of heart failure or if you experience any of the following symptoms:  Weight gain of 3 pounds or more overnight or 5 pounds in a week, increased swelling in our hands or feet or shortness of breath while lying flat in bed.   Please call your doctor as soon as you notice any of these symptoms; do not wait until your next office visit. Recognize signs and symptoms of STROKE:  F-face looks uneven    A-arms unable to move or move unevenly    S-speech slurred or non-existent    T-time-call 911 as soon as signs and symptoms begin-DO NOT go       Back to bed or wait to see if you get better-TIME IS BRAIN.

## 2021-06-04 NOTE — PROGRESS NOTES
Report to [facility]. Discharge per Kathy COTA    TRANSFER - OUT REPORT:    Verbal report given to [address] RN(name) on London Mehta  being transferred to  recovery(unit) for routine progression of care       Report consisted of patients Situation, Background, Assessment and   Recommendations(SBAR). Information from the following report(s) SBAR and Procedure Summary was reviewed with the receiving nurse. Lines:       Opportunity for questions and clarification was provided.       Patient transported with:   Registered Nurse

## 2021-06-04 NOTE — PROCEDURES
Department of Interventional Radiology  [phone]        Interventional Radiology Brief Procedure Note    Patient: Sherice Jeff MRN: [id number]  SSN: [id number]    YOB: 1945  Age: 66 y.o. 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 No definite ongoing fistula to the bladder is seen on this exam. The abscess cavity appears relatively small.      Performed By: Erin Zamora MD     Assistants: None    Anesthesia:None    Estimated Blood Loss: Less than 10ml    Specimens:  None    Implants:  None    Findings: As above    Complications: None      Signed By: Erin Zamora MD     June 4, 2021

## 2021-06-05 PROCEDURE — 3331090002 HH PPS REVENUE DEBIT

## 2021-06-05 PROCEDURE — 3331090001 HH PPS REVENUE CREDIT

## 2021-06-06 PROCEDURE — 3331090001 HH PPS REVENUE CREDIT

## 2021-06-06 PROCEDURE — 3331090002 HH PPS REVENUE DEBIT

## 2021-06-07 ENCOUNTER — HOSPITAL ENCOUNTER (EMERGENCY)
Age: 76
Discharge: HOME | End: 2021-06-07
Attending: EMERGENCY MEDICINE | Admitting: EMERGENCY MEDICINE
Payer: MEDICARE

## 2021-06-07 ENCOUNTER — APPOINTMENT (OUTPATIENT)
Dept: CT IMAGING | Age: 76
End: 2021-06-07
Attending: EMERGENCY MEDICINE
Payer: MEDICARE

## 2021-06-07 VITALS
DIASTOLIC BLOOD PRESSURE: 64 MMHG | RESPIRATION RATE: 17 BRPM | HEIGHT: 68 IN | HEART RATE: 65 BPM | TEMPERATURE: 97.9 F | SYSTOLIC BLOOD PRESSURE: 109 MMHG | BODY MASS INDEX: 17.43 KG/M2 | WEIGHT: 115 LBS | OXYGEN SATURATION: 99 %

## 2021-06-07 DIAGNOSIS — N30.00 ACUTE CYSTITIS WITHOUT HEMATURIA: Primary | ICD-10-CM

## 2021-06-07 LAB
ALBUMIN SERPL-MCNC: 3 G/DL (ref 3.2–4.6)
ALBUMIN/GLOB SERPL: 0.8 {RATIO} (ref 1.2–3.5)
ALP SERPL-CCNC: 70 U/L (ref 50–136)
ALT SERPL-CCNC: 26 U/L (ref 12–65)
ANION GAP SERPL CALC-SCNC: 6 MMOL/L (ref 7–16)
AST SERPL-CCNC: 19 U/L (ref 15–37)
BACTERIA URNS QL MICRO: ABNORMAL /HPF
BASOPHILS # BLD: 0 K/UL (ref 0–0.2)
BASOPHILS NFR BLD: 1 % (ref 0–2)
BILIRUB SERPL-MCNC: 0.6 MG/DL (ref 0.2–1.1)
BUN SERPL-MCNC: 31 MG/DL (ref 8–23)
CALCIUM SERPL-MCNC: 8.8 MG/DL (ref 8.3–10.4)
CASTS URNS QL MICRO: 0 /LPF
CHLORIDE SERPL-SCNC: 106 MMOL/L (ref 98–107)
CO2 SERPL-SCNC: 26 MMOL/L (ref 21–32)
CREAT SERPL-MCNC: 1.05 MG/DL (ref 0.8–1.5)
CRYSTALS URNS QL MICRO: 0 /LPF
DIFFERENTIAL METHOD BLD: ABNORMAL
EOSINOPHIL # BLD: 0 K/UL (ref 0–0.8)
EOSINOPHIL NFR BLD: 1 % (ref 0.5–7.8)
EPI CELLS #/AREA URNS HPF: 0 /HPF
ERYTHROCYTE [DISTWIDTH] IN BLOOD BY AUTOMATED COUNT: 15.8 % (ref 11.9–14.6)
GLOBULIN SER CALC-MCNC: 3.8 G/DL (ref 2.3–3.5)
GLUCOSE SERPL-MCNC: 94 MG/DL (ref 65–100)
HCT VFR BLD AUTO: 32 % (ref 41.1–50.3)
HGB BLD-MCNC: 10 G/DL (ref 13.6–17.2)
IMM GRANULOCYTES # BLD AUTO: 0 K/UL (ref 0–0.5)
IMM GRANULOCYTES NFR BLD AUTO: 0 % (ref 0–5)
LACTATE SERPL-SCNC: 0.8 MMOL/L (ref 0.4–2)
LYMPHOCYTES # BLD: 0.4 K/UL (ref 0.5–4.6)
LYMPHOCYTES NFR BLD: 11 % (ref 13–44)
MCH RBC QN AUTO: 27.9 PG (ref 26.1–32.9)
MCHC RBC AUTO-ENTMCNC: 31.3 G/DL (ref 31.4–35)
MCV RBC AUTO: 89.1 FL (ref 79.6–97.8)
MONOCYTES # BLD: 0.6 K/UL (ref 0.1–1.3)
MONOCYTES NFR BLD: 15 % (ref 4–12)
MUCOUS THREADS URNS QL MICRO: 0 /LPF
NEUTS SEG # BLD: 2.7 K/UL (ref 1.7–8.2)
NEUTS SEG NFR BLD: 72 % (ref 43–78)
NRBC # BLD: 0 K/UL (ref 0–0.2)
OTHER OBSERVATIONS,UCOM: ABNORMAL
PLATELET # BLD AUTO: 197 K/UL (ref 150–450)
PMV BLD AUTO: 9.6 FL (ref 9.4–12.3)
POTASSIUM SERPL-SCNC: 3.8 MMOL/L (ref 3.5–5.1)
PROT SERPL-MCNC: 6.8 G/DL (ref 6.3–8.2)
RBC # BLD AUTO: 3.59 M/UL (ref 4.23–5.6)
RBC #/AREA URNS HPF: >100 /HPF
SODIUM SERPL-SCNC: 138 MMOL/L (ref 138–145)
WBC # BLD AUTO: 3.7 K/UL (ref 4.3–11.1)
WBC URNS QL MICRO: >100 /HPF
YEAST URNS QL MICRO: ABNORMAL

## 2021-06-07 PROCEDURE — 80053 COMPREHEN METABOLIC PANEL: CPT

## 2021-06-07 PROCEDURE — 81003 URINALYSIS AUTO W/O SCOPE: CPT

## 2021-06-07 PROCEDURE — 74011000258 HC RX REV CODE- 258: Performed by: EMERGENCY MEDICINE

## 2021-06-07 PROCEDURE — 3331090001 HH PPS REVENUE CREDIT

## 2021-06-07 PROCEDURE — 85025 COMPLETE CBC W/AUTO DIFF WBC: CPT

## 2021-06-07 PROCEDURE — 3331090002 HH PPS REVENUE DEBIT

## 2021-06-07 PROCEDURE — 99285 EMERGENCY DEPT VISIT HI MDM: CPT

## 2021-06-07 PROCEDURE — 83605 ASSAY OF LACTIC ACID: CPT

## 2021-06-07 PROCEDURE — 74177 CT ABD & PELVIS W/CONTRAST: CPT

## 2021-06-07 PROCEDURE — 99284 EMERGENCY DEPT VISIT MOD MDM: CPT

## 2021-06-07 PROCEDURE — 74011000636 HC RX REV CODE- 636: Performed by: EMERGENCY MEDICINE

## 2021-06-07 PROCEDURE — 81015 MICROSCOPIC EXAM OF URINE: CPT

## 2021-06-07 RX ORDER — FLUCONAZOLE 150 MG/1
150 TABLET ORAL
Qty: 1 TABLET | Refills: 0 | Status: SHIPPED | OUTPATIENT
Start: 2021-06-07 | End: 2021-06-07

## 2021-06-07 RX ORDER — SODIUM CHLORIDE 0.9 % (FLUSH) 0.9 %
10 SYRINGE (ML) INJECTION
Status: COMPLETED | OUTPATIENT
Start: 2021-06-07 | End: 2021-06-07

## 2021-06-07 RX ORDER — CEFDINIR 300 MG/1
300 CAPSULE ORAL 2 TIMES DAILY
Qty: 14 CAPSULE | Refills: 0 | Status: SHIPPED | OUTPATIENT
Start: 2021-06-07 | End: 2021-06-14

## 2021-06-07 RX ADMIN — Medication 10 ML: at 10:29

## 2021-06-07 RX ADMIN — SODIUM CHLORIDE 100 ML: 900 INJECTION, SOLUTION INTRAVENOUS at 10:29

## 2021-06-07 RX ADMIN — IOPAMIDOL 100 ML: 755 INJECTION, SOLUTION INTRAVENOUS at 10:28

## 2021-06-07 RX ADMIN — DIATRIZOATE MEGLUMINE AND DIATRIZOATE SODIUM 30 ML: 660; 100 LIQUID ORAL; RECTAL at 08:48

## 2021-06-07 NOTE — ED NOTES
I have reviewed discharge instructions with the patient. The patient verbalized understanding. Patient left ED via Discharge Method: ambulatory to Home with self. Opportunity for questions and clarification provided. Patient given 2 scripts. To continue your aftercare when you leave the hospital, you may receive an automated call from our care team to check in on how you are doing. This is a free service and part of our promise to provide the best care and service to meet your aftercare needs.  If you have questions, or wish to unsubscribe from this service please call [phone]. Thank you for Choosing our [facility] Emergency Department.

## 2021-06-07 NOTE — ED PROVIDER NOTES
Mask was worn during the entire patient examination. Demarco Tay is a 66 y.o. male who presents to the ED with a chief complaint of fatigue. Patient has a very complicated history please see last admission for details. He has had multiple abdominal surgeries and has a colostomy as well as a DIANE drain for small abscess. He has had malignant neoplasm of the rectal colon. Today he had some serous liquid coming out of the colostomy. He was concerned that this could be urine. He denies any new pains. He has no known history of a fistula. He also reports some generalized weakness. Says he has to force himself to eat.            Past Medical History:   Diagnosis Date    Acute deep vein thrombosis (DVT) of non-extremity vein 5/20/2019    Family history of malignant neoplasm of gastrointestinal tract     mother colon/ovarian cancer 67    Fecal incontinence     LAR syndrome    Former cigarette smoker     History of rectal cancer     Hypothyroid     stable w/med    Infection and inflammatory reaction due to other internal prosthetic devices, implants and grafts, subsequent encounter 2017    abd wound/mesh colostomy    Insomnia     takes meds    Major depression     Osteoarthritis, hand     Personal history of colonic polyps 9/2013    x 1    Personal history of malignant neoplasm of rectum, rectosigmoid junction, and anus 9/2013    Psychiatric disorder     anxiety       Past Surgical History:   Procedure Laterality Date    COLONOSCOPY N/A 2/12/2018    COLONOSCOPY / BMI=21 performed by Mauro Smith MD at [facility] N/A 3/5/2021    COLONOSCOPY/BMI 19 performed by Priti Camara MD at [address] [place] [address]  2/12/2018         COLONOSCOPY THRU STOMA,LESN RMVL W/SNARE  3/5/2021         ENDOSCOPY, COLON, DIAGNOSTIC  last 2/3/15    Emily--no polyps--3 year recall    HX COLOSTOMY  5/11/16    HX GI  4/2016    Sacral nerve stimlator lead trial (unsucessful) and removal    HX HERNIA REPAIR  3/2015, 2016    HX HERNIA REPAIR      and Colostomy in May    HX OTHER SURGICAL  10/7/2013    Emily---endorectal us    HX OTHER SURGICAL Bilateral     cataracts      HX POLYPECTOMY      HX TOTAL COLECTOMY  2013    Emily--lap LAR with coloproctostomy, mobilization splenic flexure, diverting loop ileostomy    HX TOTAL COLECTOMY Right 2014    for leak    HX VASCULAR ACCESS Left     single lumen port/subsequently removed    IR CHANGE ABSCESS / CYST CATHETER  2021    IR INJ ABS CYST VIA CATHETER / TUBE  2021    IR INJ ABS CYST VIA CATHETER / TUBE  2021    IR INSERT TUNL CVC W/O PORT OVER 5 YR  2019    IR REPLACE CVC TUNNELED W/O PORT  2019         Family History:   Problem Relation Age of Onset    Cancer Mother         colon and ovarian    Cancer Brother         prostate    Alcohol abuse Brother     Cancer Maternal Grandmother         bone    Alcohol abuse Father     Alcohol abuse Sister     Stroke Paternal Grandfather        Social History     Socioeconomic History    Marital status: SINGLE     Spouse name: Not on file    Number of children: Not on file    Years of education: Not on file    Highest education level: Not on file   Occupational History    Not on file   Tobacco Use    Smoking status: Former Smoker     Packs/day: 1.00     Years: 4.00     Pack years: 4.00     Types: Cigarettes     Quit date: 1963     Years since quittin.5    Smokeless tobacco: Never Used   Substance and Sexual Activity    Alcohol use: Not Currently     Alcohol/week: 11.7 standard drinks     Types: 14 Cans of beer per week     Comment: advised stop drinking given issues    Drug use: No    Sexual activity: Never   Other Topics Concern    Not on file   Social History Narrative    Not on file     Social Determinants of Health     Financial Resource Strain:     Difficulty of Paying Living Expenses:    Food Insecurity:     Worried About Running Out of Food in the Last Year:     Kristen of Food in the Last Year:    Transportation Needs:     Lack of Transportation (Medical):  Lack of Transportation (Non-Medical):    Physical Activity:     Days of Exercise per Week:     Minutes of Exercise per Session:    Stress:     Feeling of Stress :    Social Connections:     Frequency of Communication with Friends and Family:     Frequency of Social Gatherings with Friends and Family:     Attends Nondenominational Services:     Active Member of Clubs or Organizations:     Attends Club or Organization Meetings:     Marital Status:    Intimate Partner Violence:     Fear of Current or Ex-Partner:     Emotionally Abused:     Physically Abused:     Sexually Abused: ALLERGIES: Patient has no known allergies. Review of Systems   Constitutional: Positive for fatigue. Negative for activity change, chills and fever. Eyes: Negative for photophobia and visual disturbance. Respiratory: Negative for apnea, cough, chest tightness, shortness of breath, wheezing and stridor. Cardiovascular: Negative for chest pain and palpitations. Gastrointestinal: Negative for abdominal pain, diarrhea, nausea and vomiting. Musculoskeletal: Negative for neck pain and neck stiffness. Skin: Negative for pallor and rash. Neurological: Positive for weakness. All other systems reviewed and are negative. Vitals:    06/07/21 0808 06/07/21 0809   BP:  106/66   Pulse:  85   Resp:  16   Temp: 97.9 °F (36.6 °C)    SpO2:  96%   Weight:  52.2 kg (115 lb)   Height:  5' 8\" (1.727 m)            Physical Exam  Vitals and nursing note reviewed. Constitutional:       General: He is not in acute distress. Appearance: He is well-developed. He is ill-appearing. He is not toxic-appearing or diaphoretic. Comments: Appears thin and emaciated   HENT:      Head: Normocephalic and atraumatic. Eyes:      General: No scleral icterus.      Conjunctiva/sclera: Conjunctivae normal.   Neck:      Trachea: No tracheal deviation. Cardiovascular:      Rate and Rhythm: Normal rate and regular rhythm. Pulmonary:      Effort: Pulmonary effort is normal. No respiratory distress. Breath sounds: No stridor. No wheezing, rhonchi or rales. Abdominal:      General: Abdomen is flat. Palpations: Abdomen is soft. There is no mass. Tenderness: There is no abdominal tenderness. There is no right CVA tenderness, left CVA tenderness, guarding or rebound. Skin:     General: Skin is warm. Capillary Refill: Capillary refill takes less than 2 seconds. Coloration: Skin is not jaundiced. Findings: No erythema, lesion or rash. Neurological:      General: No focal deficit present. Mental Status: He is alert. Mental status is at baseline. Psychiatric:         Mood and Affect: Mood normal.         Behavior: Behavior normal.          MDM  Number of Diagnoses or Management Options  Diagnosis management comments: Patient has no signs of any fistulous to his colostomy. He does still have a fluid collection I called and spoke with Dr. Carmella Su from interventional radiology he does not think that the tube can be any better position and it is closed by that area and should be left in as it still may drain some. I do have him scheduled for follow-up and no him well. Patient's abdomen is benign I will treat for urinary tract infection and yeast infection.          Amount and/or Complexity of Data Reviewed  Clinical lab tests: reviewed and ordered (Results for orders placed or performed during the hospital encounter of 06/07/21  -CBC WITH AUTOMATED DIFF       Result                      Value             Ref Range           WBC                         3.7 (L)           4.3 - 11.1 K*       RBC                         3.59 (L)          4.23 - 5.6 M*       HGB                         10.0 (L)          13.6 - 17.2 *       HCT                         32.0 (L) 41.1 - 50.3 %       MCV                         89.1              79.6 - 97.8 *       MCH                         27.9              26.1 - 32.9 *       MCHC                        31.3 (L)          31.4 - 35.0 *       RDW                         15.8 (H)          11.9 - 14.6 %       PLATELET                    197               150 - 450 K/*       MPV                         9.6               9.4 - 12.3 FL       ABSOLUTE NRBC               0.00              0.0 - 0.2 K/*       DF                          AUTOMATED                             NEUTROPHILS                 72                43 - 78 %           LYMPHOCYTES                 11 (L)            13 - 44 %           MONOCYTES                   15 (H)            4.0 - 12.0 %        EOSINOPHILS                 1                 0.5 - 7.8 %         BASOPHILS                   1                 0.0 - 2.0 %         IMMATURE GRANULOCYTES       0                 0.0 - 5.0 %         ABS. NEUTROPHILS            2.7               1.7 - 8.2 K/*       ABS. LYMPHOCYTES            0.4 (L)           0.5 - 4.6 K/*       ABS. MONOCYTES              0.6               0.1 - 1.3 K/*       ABS. EOSINOPHILS            0.0               0.0 - 0.8 K/*       ABS. BASOPHILS              0.0               0.0 - 0.2 K/*       ABS. IMM.  GRANS.            0.0               0.0 - 0.5 K/*  -METABOLIC PANEL, COMPREHENSIVE       Result                      Value             Ref Range           Sodium                      138               138 - 145 mm*       Potassium                   3.8               3.5 - 5.1 mm*       Chloride                    106               98 - 107 mmo*       CO2                         26                21 - 32 mmol*       Anion gap                   6 (L)             7 - 16 mmol/L       Glucose                     94                65 - 100 mg/*       BUN                         31 (H)            8 - 23 MG/DL        Creatinine                  1.05 0.8 - 1.5 MG*       GFR est AA                  >60               >60 ml/min/1*       GFR est non-AA              >60               >60 ml/min/1*       Calcium                     8.8               8.3 - 10.4 M*       Bilirubin, total            0.6               0.2 - 1.1 MG*       ALT (SGPT)                  26                12 - 65 U/L         AST (SGOT)                  19                15 - 37 U/L         Alk.  phosphatase            70                50 - 136 U/L        Protein, total              6.8               6.3 - 8.2 g/*       Albumin                     3.0 (L)           3.2 - 4.6 g/*       Globulin                    3.8 (H)           2.3 - 3.5 g/*       A-G Ratio                   0.8 (L)           1.2 - 3.5      -LACTIC ACID       Result                      Value             Ref Range           Lactic acid                 0.8               0.4 - 2.0 MM*  -URINE MICROSCOPIC       Result                      Value             Ref Range           WBC                         >100              0 /hpf              RBC                         >100              0 /hpf              Epithelial cells            0                 0 /hpf              Bacteria                    4+ (H)            0 /hpf              Casts                       0                 0 /lpf              Crystals, urine             0                 0 /LPF              Mucus                       0                 0 /lpf              Yeast                       OCCASIONAL                            Other observations                                            RESULTS VERIFIED MANUAL)  Tests in the radiology section of CPT®: ordered and reviewed           Procedures

## 2021-06-07 NOTE — ED TRIAGE NOTES
Arrives via ems from home. Reports generalized weakness for a few months but today pt had urine going into the colostomy bag.

## 2021-06-08 ENCOUNTER — HOME CARE VISIT (OUTPATIENT)
Dept: SCHEDULING | Facility: HOME HEALTH | Age: 76
End: 2021-06-08
Payer: MEDICARE

## 2021-06-08 PROCEDURE — G0299 HHS/HOSPICE OF RN EA 15 MIN: HCPCS

## 2021-06-08 PROCEDURE — 3331090002 HH PPS REVENUE DEBIT

## 2021-06-08 PROCEDURE — 3331090001 HH PPS REVENUE CREDIT

## 2021-06-09 VITALS
TEMPERATURE: 98.8 F | RESPIRATION RATE: 16 BRPM | OXYGEN SATURATION: 100 % | DIASTOLIC BLOOD PRESSURE: 58 MMHG | SYSTOLIC BLOOD PRESSURE: 104 MMHG | HEART RATE: 94 BPM

## 2021-06-09 PROCEDURE — 3331090001 HH PPS REVENUE CREDIT

## 2021-06-09 PROCEDURE — 3331090002 HH PPS REVENUE DEBIT

## 2021-06-10 ENCOUNTER — HOSPITAL ENCOUNTER (OUTPATIENT)
Dept: CT IMAGING | Age: 76
Discharge: HOME | End: 2021-06-10
Attending: UROLOGY
Payer: MEDICARE

## 2021-06-10 ENCOUNTER — HOSPITAL ENCOUNTER (OUTPATIENT)
Dept: ULTRASOUND IMAGING | Age: 76
Discharge: HOME | End: 2021-06-10
Attending: UROLOGY
Payer: MEDICARE

## 2021-06-10 DIAGNOSIS — N20.1 RIGHT URETERAL STONE: ICD-10-CM

## 2021-06-10 DIAGNOSIS — N13.30 BILATERAL HYDRONEPHROSIS: ICD-10-CM

## 2021-06-10 PROCEDURE — 3331090001 HH PPS REVENUE CREDIT

## 2021-06-10 PROCEDURE — 74176 CT ABD & PELVIS W/O CONTRAST: CPT

## 2021-06-10 PROCEDURE — 3331090002 HH PPS REVENUE DEBIT

## 2021-06-10 PROCEDURE — 76770 US EXAM ABDO BACK WALL COMP: CPT

## 2021-06-11 ENCOUNTER — HOME CARE VISIT (OUTPATIENT)
Dept: HOME HEALTH SERVICES | Facility: HOME HEALTH | Age: 76
End: 2021-06-11
Payer: MEDICARE

## 2021-06-11 ENCOUNTER — HOME CARE VISIT (OUTPATIENT)
Dept: SCHEDULING | Facility: HOME HEALTH | Age: 76
End: 2021-06-11
Payer: MEDICARE

## 2021-06-11 VITALS
HEART RATE: 82 BPM | TEMPERATURE: 98.2 F | SYSTOLIC BLOOD PRESSURE: 108 MMHG | OXYGEN SATURATION: 96 % | DIASTOLIC BLOOD PRESSURE: 64 MMHG | RESPIRATION RATE: 18 BRPM

## 2021-06-11 PROCEDURE — 3331090002 HH PPS REVENUE DEBIT

## 2021-06-11 PROCEDURE — 3331090001 HH PPS REVENUE CREDIT

## 2021-06-11 PROCEDURE — G0299 HHS/HOSPICE OF RN EA 15 MIN: HCPCS

## 2021-06-12 PROCEDURE — 3331090001 HH PPS REVENUE CREDIT

## 2021-06-12 PROCEDURE — 3331090002 HH PPS REVENUE DEBIT

## 2021-06-13 ENCOUNTER — HOME CARE VISIT (OUTPATIENT)
Dept: SCHEDULING | Facility: HOME HEALTH | Age: 76
End: 2021-06-13
Payer: MEDICARE

## 2021-06-13 VITALS
DIASTOLIC BLOOD PRESSURE: 62 MMHG | SYSTOLIC BLOOD PRESSURE: 120 MMHG | OXYGEN SATURATION: 99 % | HEART RATE: 98 BPM | TEMPERATURE: 98.9 F | RESPIRATION RATE: 17 BRPM

## 2021-06-13 PROCEDURE — 3331090001 HH PPS REVENUE CREDIT

## 2021-06-13 PROCEDURE — G0299 HHS/HOSPICE OF RN EA 15 MIN: HCPCS

## 2021-06-13 PROCEDURE — 3331090002 HH PPS REVENUE DEBIT

## 2021-06-14 ENCOUNTER — HOSPITAL ENCOUNTER (OUTPATIENT)
Dept: INTERVENTIONAL RADIOLOGY/VASCULAR | Age: 76
Discharge: HOME | End: 2021-06-14
Attending: RADIOLOGY
Payer: MEDICARE

## 2021-06-14 VITALS
DIASTOLIC BLOOD PRESSURE: 60 MMHG | SYSTOLIC BLOOD PRESSURE: 110 MMHG | HEART RATE: 80 BPM | RESPIRATION RATE: 18 BRPM | TEMPERATURE: 97.8 F | OXYGEN SATURATION: 99 %

## 2021-06-14 DIAGNOSIS — L02.91 ABSCESS: ICD-10-CM

## 2021-06-14 PROCEDURE — 3331090001 HH PPS REVENUE CREDIT

## 2021-06-14 PROCEDURE — 74011000250 HC RX REV CODE- 250: Performed by: RADIOLOGY

## 2021-06-14 PROCEDURE — 49423 EXCHANGE DRAINAGE CATHETER: CPT

## 2021-06-14 PROCEDURE — 74011000636 HC RX REV CODE- 636: Performed by: RADIOLOGY

## 2021-06-14 PROCEDURE — C1729 CATH, DRAINAGE: HCPCS

## 2021-06-14 PROCEDURE — C1769 GUIDE WIRE: HCPCS

## 2021-06-14 PROCEDURE — 3331090002 HH PPS REVENUE DEBIT

## 2021-06-14 PROCEDURE — 77030002916 HC SUT ETHLN J&J -A

## 2021-06-14 PROCEDURE — 77030027478 HC TBNG JP W BLB MRTM -B

## 2021-06-14 RX ORDER — LIDOCAINE HYDROCHLORIDE 10 MG/ML
20-200 INJECTION INFILTRATION; PERINEURAL ONCE
Status: COMPLETED | OUTPATIENT
Start: 2021-06-14 | End: 2021-06-14

## 2021-06-14 RX ADMIN — IOPAMIDOL 8 ML: 612 INJECTION, SOLUTION INTRAVENOUS at 14:51

## 2021-06-14 RX ADMIN — LIDOCAINE HYDROCHLORIDE 10 ML: 10 INJECTION, SOLUTION INFILTRATION; PERINEURAL at 14:51

## 2021-06-15 ENCOUNTER — HOME CARE VISIT (OUTPATIENT)
Dept: SCHEDULING | Facility: HOME HEALTH | Age: 76
End: 2021-06-15
Payer: MEDICARE

## 2021-06-15 PROCEDURE — 3331090002 HH PPS REVENUE DEBIT

## 2021-06-15 PROCEDURE — 3331090001 HH PPS REVENUE CREDIT

## 2021-06-15 PROCEDURE — G0299 HHS/HOSPICE OF RN EA 15 MIN: HCPCS

## 2021-06-16 VITALS
HEART RATE: 74 BPM | DIASTOLIC BLOOD PRESSURE: 78 MMHG | SYSTOLIC BLOOD PRESSURE: 122 MMHG | OXYGEN SATURATION: 98 % | TEMPERATURE: 97.5 F | RESPIRATION RATE: 16 BRPM

## 2021-06-16 PROCEDURE — A6212 FOAM DRG <=16 SQ IN W/BORDER: HCPCS

## 2021-06-16 PROCEDURE — A9270 NON-COVERED ITEM OR SERVICE: HCPCS

## 2021-06-16 PROCEDURE — A6216 NON-STERILE GAUZE<=16 SQ IN: HCPCS

## 2021-06-16 PROCEDURE — 3331090001 HH PPS REVENUE CREDIT

## 2021-06-16 PROCEDURE — A4450 NON-WATERPROOF TAPE: HCPCS

## 2021-06-16 PROCEDURE — A5120 SKIN BARRIER, WIPE OR SWAB: HCPCS

## 2021-06-16 PROCEDURE — 3331090002 HH PPS REVENUE DEBIT

## 2021-06-16 PROCEDURE — A6252 ABSORPT DRG >16 <=48 W/O BDR: HCPCS

## 2021-06-17 ENCOUNTER — HOME CARE VISIT (OUTPATIENT)
Dept: SCHEDULING | Facility: HOME HEALTH | Age: 76
End: 2021-06-17
Payer: MEDICARE

## 2021-06-17 PROCEDURE — 3331090001 HH PPS REVENUE CREDIT

## 2021-06-17 PROCEDURE — 400018 HH-NO PAY CLAIM PROCEDURE

## 2021-06-17 PROCEDURE — 3331090002 HH PPS REVENUE DEBIT

## 2021-06-17 PROCEDURE — 400013 HH SOC

## 2021-06-17 PROCEDURE — G0155 HHCP-SVS OF CSW,EA 15 MIN: HCPCS

## 2021-06-18 ENCOUNTER — HOME CARE VISIT (OUTPATIENT)
Dept: SCHEDULING | Facility: HOME HEALTH | Age: 76
End: 2021-06-18
Payer: MEDICARE

## 2021-06-18 PROCEDURE — G0299 HHS/HOSPICE OF RN EA 15 MIN: HCPCS

## 2021-06-18 PROCEDURE — A4245 ALCOHOL WIPES PER BOX: HCPCS

## 2021-06-18 PROCEDURE — MED11704

## 2021-06-18 PROCEDURE — 3331090001 HH PPS REVENUE CREDIT

## 2021-06-18 PROCEDURE — 3331090002 HH PPS REVENUE DEBIT

## 2021-06-19 VITALS
RESPIRATION RATE: 18 BRPM | HEART RATE: 83 BPM | OXYGEN SATURATION: 100 % | TEMPERATURE: 98 F | DIASTOLIC BLOOD PRESSURE: 60 MMHG | SYSTOLIC BLOOD PRESSURE: 116 MMHG

## 2021-06-19 PROCEDURE — 3331090002 HH PPS REVENUE DEBIT

## 2021-06-19 PROCEDURE — 3331090001 HH PPS REVENUE CREDIT

## 2021-06-20 PROCEDURE — 3331090001 HH PPS REVENUE CREDIT

## 2021-06-20 PROCEDURE — 3331090002 HH PPS REVENUE DEBIT

## 2021-06-21 ENCOUNTER — HOME CARE VISIT (OUTPATIENT)
Dept: SCHEDULING | Facility: HOME HEALTH | Age: 76
End: 2021-06-21
Payer: MEDICARE

## 2021-06-21 VITALS
OXYGEN SATURATION: 98 % | SYSTOLIC BLOOD PRESSURE: 110 MMHG | TEMPERATURE: 97.6 F | HEART RATE: 78 BPM | DIASTOLIC BLOOD PRESSURE: 60 MMHG | RESPIRATION RATE: 18 BRPM

## 2021-06-21 PROCEDURE — 3331090002 HH PPS REVENUE DEBIT

## 2021-06-21 PROCEDURE — G0299 HHS/HOSPICE OF RN EA 15 MIN: HCPCS

## 2021-06-21 PROCEDURE — 3331090001 HH PPS REVENUE CREDIT

## 2021-06-22 PROCEDURE — 3331090001 HH PPS REVENUE CREDIT

## 2021-06-22 PROCEDURE — 3331090002 HH PPS REVENUE DEBIT

## 2021-06-23 ENCOUNTER — HOME CARE VISIT (OUTPATIENT)
Dept: SCHEDULING | Facility: HOME HEALTH | Age: 76
End: 2021-06-23
Payer: MEDICARE

## 2021-06-23 PROCEDURE — 3331090001 HH PPS REVENUE CREDIT

## 2021-06-23 PROCEDURE — 3331090002 HH PPS REVENUE DEBIT

## 2021-06-23 PROCEDURE — G0299 HHS/HOSPICE OF RN EA 15 MIN: HCPCS

## 2021-06-24 VITALS
SYSTOLIC BLOOD PRESSURE: 128 MMHG | DIASTOLIC BLOOD PRESSURE: 60 MMHG | OXYGEN SATURATION: 98 % | RESPIRATION RATE: 16 BRPM | HEART RATE: 70 BPM | TEMPERATURE: 97.9 F

## 2021-06-24 PROCEDURE — 3331090002 HH PPS REVENUE DEBIT

## 2021-06-24 PROCEDURE — 3331090001 HH PPS REVENUE CREDIT

## 2021-06-25 ENCOUNTER — HOME CARE VISIT (OUTPATIENT)
Dept: SCHEDULING | Facility: HOME HEALTH | Age: 76
End: 2021-06-25
Payer: MEDICARE

## 2021-06-25 PROCEDURE — 3331090001 HH PPS REVENUE CREDIT

## 2021-06-25 PROCEDURE — 3331090002 HH PPS REVENUE DEBIT

## 2021-06-25 PROCEDURE — G0299 HHS/HOSPICE OF RN EA 15 MIN: HCPCS

## 2021-06-26 VITALS
OXYGEN SATURATION: 95 % | HEART RATE: 78 BPM | SYSTOLIC BLOOD PRESSURE: 110 MMHG | RESPIRATION RATE: 16 BRPM | DIASTOLIC BLOOD PRESSURE: 62 MMHG | TEMPERATURE: 98.6 F

## 2021-06-26 PROCEDURE — 3331090001 HH PPS REVENUE CREDIT

## 2021-06-26 PROCEDURE — 3331090002 HH PPS REVENUE DEBIT

## 2021-06-27 PROCEDURE — 3331090002 HH PPS REVENUE DEBIT

## 2021-06-27 PROCEDURE — 3331090001 HH PPS REVENUE CREDIT

## 2021-06-28 ENCOUNTER — HOME CARE VISIT (OUTPATIENT)
Dept: SCHEDULING | Facility: HOME HEALTH | Age: 76
End: 2021-06-28
Payer: MEDICARE

## 2021-06-28 VITALS
DIASTOLIC BLOOD PRESSURE: 80 MMHG | TEMPERATURE: 98 F | SYSTOLIC BLOOD PRESSURE: 124 MMHG | HEART RATE: 88 BPM | OXYGEN SATURATION: 98 % | RESPIRATION RATE: 18 BRPM

## 2021-06-28 PROCEDURE — 3331090001 HH PPS REVENUE CREDIT

## 2021-06-28 PROCEDURE — G0299 HHS/HOSPICE OF RN EA 15 MIN: HCPCS

## 2021-06-28 PROCEDURE — 3331090002 HH PPS REVENUE DEBIT

## 2021-06-29 VITALS
TEMPERATURE: 97.8 F | OXYGEN SATURATION: 98 % | HEART RATE: 79 BPM | DIASTOLIC BLOOD PRESSURE: 64 MMHG | SYSTOLIC BLOOD PRESSURE: 118 MMHG | RESPIRATION RATE: 18 BRPM

## 2021-06-29 PROCEDURE — 3331090002 HH PPS REVENUE DEBIT

## 2021-06-29 PROCEDURE — 3331090001 HH PPS REVENUE CREDIT

## 2021-06-30 ENCOUNTER — HOME CARE VISIT (OUTPATIENT)
Dept: SCHEDULING | Facility: HOME HEALTH | Age: 76
End: 2021-06-30
Payer: MEDICARE

## 2021-06-30 PROCEDURE — 3331090001 HH PPS REVENUE CREDIT

## 2021-06-30 PROCEDURE — 3331090002 HH PPS REVENUE DEBIT

## 2021-06-30 PROCEDURE — G0299 HHS/HOSPICE OF RN EA 15 MIN: HCPCS

## 2021-07-01 ENCOUNTER — HOME CARE VISIT (OUTPATIENT)
Dept: HOME HEALTH SERVICES | Facility: HOME HEALTH | Age: 76
End: 2021-07-01
Payer: MEDICARE

## 2021-07-01 VITALS
OXYGEN SATURATION: 98 % | HEART RATE: 72 BPM | SYSTOLIC BLOOD PRESSURE: 110 MMHG | RESPIRATION RATE: 16 BRPM | DIASTOLIC BLOOD PRESSURE: 68 MMHG | TEMPERATURE: 98.4 F

## 2021-07-01 PROCEDURE — 3331090001 HH PPS REVENUE CREDIT

## 2021-07-01 PROCEDURE — 3331090002 HH PPS REVENUE DEBIT

## 2021-07-02 ENCOUNTER — HOME CARE VISIT (OUTPATIENT)
Dept: SCHEDULING | Facility: HOME HEALTH | Age: 76
End: 2021-07-02
Payer: MEDICARE

## 2021-07-02 VITALS
SYSTOLIC BLOOD PRESSURE: 104 MMHG | OXYGEN SATURATION: 100 % | HEART RATE: 88 BPM | DIASTOLIC BLOOD PRESSURE: 58 MMHG | TEMPERATURE: 97.5 F | RESPIRATION RATE: 16 BRPM

## 2021-07-02 PROCEDURE — 3331090002 HH PPS REVENUE DEBIT

## 2021-07-02 PROCEDURE — 3331090001 HH PPS REVENUE CREDIT

## 2021-07-02 PROCEDURE — G0299 HHS/HOSPICE OF RN EA 15 MIN: HCPCS

## 2021-07-03 PROCEDURE — 3331090001 HH PPS REVENUE CREDIT

## 2021-07-03 PROCEDURE — 3331090002 HH PPS REVENUE DEBIT

## 2021-07-04 PROCEDURE — 3331090002 HH PPS REVENUE DEBIT

## 2021-07-04 PROCEDURE — 3331090001 HH PPS REVENUE CREDIT

## 2021-07-05 ENCOUNTER — HOME CARE VISIT (OUTPATIENT)
Dept: SCHEDULING | Facility: HOME HEALTH | Age: 76
End: 2021-07-05
Payer: MEDICARE

## 2021-07-05 VITALS
TEMPERATURE: 98.3 F | HEART RATE: 78 BPM | DIASTOLIC BLOOD PRESSURE: 68 MMHG | RESPIRATION RATE: 16 BRPM | OXYGEN SATURATION: 95 % | SYSTOLIC BLOOD PRESSURE: 108 MMHG

## 2021-07-05 PROCEDURE — 3331090002 HH PPS REVENUE DEBIT

## 2021-07-05 PROCEDURE — G0299 HHS/HOSPICE OF RN EA 15 MIN: HCPCS

## 2021-07-05 PROCEDURE — 3331090001 HH PPS REVENUE CREDIT

## 2021-07-06 ENCOUNTER — HOSPITAL ENCOUNTER (OUTPATIENT)
Dept: ULTRASOUND IMAGING | Age: 76
Discharge: HOME | End: 2021-07-06
Attending: UROLOGY
Payer: MEDICARE

## 2021-07-06 DIAGNOSIS — N13.30 HYDRONEPHROSIS OF RIGHT KIDNEY: ICD-10-CM

## 2021-07-06 PROCEDURE — 3331090002 HH PPS REVENUE DEBIT

## 2021-07-06 PROCEDURE — 3331090001 HH PPS REVENUE CREDIT

## 2021-07-06 PROCEDURE — 76770 US EXAM ABDO BACK WALL COMP: CPT

## 2021-07-07 ENCOUNTER — HOME CARE VISIT (OUTPATIENT)
Dept: SCHEDULING | Facility: HOME HEALTH | Age: 76
End: 2021-07-07
Payer: MEDICARE

## 2021-07-07 VITALS
HEART RATE: 88 BPM | RESPIRATION RATE: 16 BRPM | OXYGEN SATURATION: 94 % | TEMPERATURE: 97.9 F | SYSTOLIC BLOOD PRESSURE: 108 MMHG | DIASTOLIC BLOOD PRESSURE: 68 MMHG

## 2021-07-07 PROCEDURE — 3331090001 HH PPS REVENUE CREDIT

## 2021-07-07 PROCEDURE — 3331090002 HH PPS REVENUE DEBIT

## 2021-07-07 PROCEDURE — G0299 HHS/HOSPICE OF RN EA 15 MIN: HCPCS

## 2021-07-08 ENCOUNTER — HOSPITAL ENCOUNTER (OUTPATIENT)
Dept: INTERVENTIONAL RADIOLOGY/VASCULAR | Age: 76
Discharge: HOME | End: 2021-07-08
Attending: RADIOLOGY
Payer: MEDICARE

## 2021-07-08 VITALS
DIASTOLIC BLOOD PRESSURE: 80 MMHG | RESPIRATION RATE: 18 BRPM | WEIGHT: 115 LBS | HEART RATE: 71 BPM | SYSTOLIC BLOOD PRESSURE: 150 MMHG | TEMPERATURE: 97.8 F | HEIGHT: 68 IN | OXYGEN SATURATION: 99 % | BODY MASS INDEX: 17.43 KG/M2

## 2021-07-08 DIAGNOSIS — L02.91 ABSCESS: ICD-10-CM

## 2021-07-08 PROCEDURE — C1729 CATH, DRAINAGE: HCPCS

## 2021-07-08 PROCEDURE — 74011000250 HC RX REV CODE- 250: Performed by: RADIOLOGY

## 2021-07-08 PROCEDURE — C1769 GUIDE WIRE: HCPCS

## 2021-07-08 PROCEDURE — 74011000636 HC RX REV CODE- 636: Performed by: RADIOLOGY

## 2021-07-08 PROCEDURE — 3331090002 HH PPS REVENUE DEBIT

## 2021-07-08 PROCEDURE — 77030002916 HC SUT ETHLN J&J -A

## 2021-07-08 PROCEDURE — 49423 EXCHANGE DRAINAGE CATHETER: CPT

## 2021-07-08 PROCEDURE — 77030027478 HC TBNG JP W BLB MRTM -B

## 2021-07-08 PROCEDURE — 3331090001 HH PPS REVENUE CREDIT

## 2021-07-08 RX ORDER — LIDOCAINE HYDROCHLORIDE 20 MG/ML
20-200 INJECTION, SOLUTION INFILTRATION; PERINEURAL
Status: DISCONTINUED | OUTPATIENT
Start: 2021-07-08 | End: 2021-07-08

## 2021-07-08 RX ADMIN — IOPAMIDOL 28 ML: 612 INJECTION, SOLUTION INTRAVENOUS at 14:34

## 2021-07-08 RX ADMIN — LIDOCAINE HYDROCHLORIDE 100 MG: 20 INJECTION, SOLUTION INFILTRATION; PERINEURAL at 14:27

## 2021-07-08 NOTE — PROGRESS NOTES
IR Nurse Pre-Procedure Checklist Part 2          Consent signed: Yes    H&P complete:  Not applicable    Antibiotics: Not applicable    Airway/Mallampati Done: Not applicable    Shaved: Not applicable    Pregnancy Form:Not applicable    Patient Position: Yes    MD Side: Yes     Biopsy Worksheet: Not applicable    Specimen Medium: Not applicable    Pt to IR suite 1 via stretcher with RN.

## 2021-07-09 ENCOUNTER — HOME CARE VISIT (OUTPATIENT)
Dept: SCHEDULING | Facility: HOME HEALTH | Age: 76
End: 2021-07-09
Payer: MEDICARE

## 2021-07-09 PROCEDURE — 3331090001 HH PPS REVENUE CREDIT

## 2021-07-09 PROCEDURE — G0299 HHS/HOSPICE OF RN EA 15 MIN: HCPCS

## 2021-07-09 PROCEDURE — 3331090002 HH PPS REVENUE DEBIT

## 2021-07-10 PROCEDURE — 3331090001 HH PPS REVENUE CREDIT

## 2021-07-10 PROCEDURE — 3331090002 HH PPS REVENUE DEBIT

## 2021-07-11 PROCEDURE — 3331090001 HH PPS REVENUE CREDIT

## 2021-07-11 PROCEDURE — 3331090002 HH PPS REVENUE DEBIT

## 2021-07-12 ENCOUNTER — HOME CARE VISIT (OUTPATIENT)
Dept: SCHEDULING | Facility: HOME HEALTH | Age: 76
End: 2021-07-12
Payer: MEDICARE

## 2021-07-12 VITALS
SYSTOLIC BLOOD PRESSURE: 104 MMHG | DIASTOLIC BLOOD PRESSURE: 60 MMHG | OXYGEN SATURATION: 98 % | TEMPERATURE: 98.9 F | HEART RATE: 88 BPM | RESPIRATION RATE: 16 BRPM

## 2021-07-12 PROCEDURE — 3331090002 HH PPS REVENUE DEBIT

## 2021-07-12 PROCEDURE — 3331090001 HH PPS REVENUE CREDIT

## 2021-07-12 PROCEDURE — G0299 HHS/HOSPICE OF RN EA 15 MIN: HCPCS

## 2021-07-13 VITALS
DIASTOLIC BLOOD PRESSURE: 76 MMHG | OXYGEN SATURATION: 95 % | HEART RATE: 68 BPM | RESPIRATION RATE: 16 BRPM | SYSTOLIC BLOOD PRESSURE: 122 MMHG | TEMPERATURE: 98 F

## 2021-07-13 PROCEDURE — 3331090002 HH PPS REVENUE DEBIT

## 2021-07-13 PROCEDURE — 3331090001 HH PPS REVENUE CREDIT

## 2021-07-14 ENCOUNTER — HOME CARE VISIT (OUTPATIENT)
Dept: SCHEDULING | Facility: HOME HEALTH | Age: 76
End: 2021-07-14
Payer: MEDICARE

## 2021-07-14 PROCEDURE — G0299 HHS/HOSPICE OF RN EA 15 MIN: HCPCS

## 2021-07-14 PROCEDURE — 3331090002 HH PPS REVENUE DEBIT

## 2021-07-14 PROCEDURE — 3331090001 HH PPS REVENUE CREDIT

## 2021-07-15 VITALS
RESPIRATION RATE: 16 BRPM | OXYGEN SATURATION: 99 % | TEMPERATURE: 97.9 F | SYSTOLIC BLOOD PRESSURE: 110 MMHG | HEART RATE: 80 BPM | DIASTOLIC BLOOD PRESSURE: 68 MMHG

## 2021-07-15 PROCEDURE — 3331090001 HH PPS REVENUE CREDIT

## 2021-07-15 PROCEDURE — 3331090002 HH PPS REVENUE DEBIT

## 2021-07-16 ENCOUNTER — HOME CARE VISIT (OUTPATIENT)
Dept: SCHEDULING | Facility: HOME HEALTH | Age: 76
End: 2021-07-16
Payer: MEDICARE

## 2021-07-16 PROCEDURE — 3331090002 HH PPS REVENUE DEBIT

## 2021-07-16 PROCEDURE — G0299 HHS/HOSPICE OF RN EA 15 MIN: HCPCS

## 2021-07-16 PROCEDURE — 3331090001 HH PPS REVENUE CREDIT

## 2021-07-17 PROCEDURE — 3331090002 HH PPS REVENUE DEBIT

## 2021-07-17 PROCEDURE — 3331090001 HH PPS REVENUE CREDIT

## 2021-07-17 PROCEDURE — 400018 HH-NO PAY CLAIM PROCEDURE

## 2021-07-18 PROCEDURE — 3331090002 HH PPS REVENUE DEBIT

## 2021-07-18 PROCEDURE — 3331090001 HH PPS REVENUE CREDIT

## 2021-07-19 ENCOUNTER — HOME CARE VISIT (OUTPATIENT)
Dept: SCHEDULING | Facility: HOME HEALTH | Age: 76
End: 2021-07-19
Payer: MEDICARE

## 2021-07-19 VITALS
RESPIRATION RATE: 16 BRPM | TEMPERATURE: 98.8 F | DIASTOLIC BLOOD PRESSURE: 66 MMHG | OXYGEN SATURATION: 98 % | SYSTOLIC BLOOD PRESSURE: 118 MMHG | HEART RATE: 89 BPM

## 2021-07-19 VITALS
DIASTOLIC BLOOD PRESSURE: 58 MMHG | RESPIRATION RATE: 16 BRPM | TEMPERATURE: 97.7 F | OXYGEN SATURATION: 100 % | SYSTOLIC BLOOD PRESSURE: 112 MMHG | HEART RATE: 84 BPM

## 2021-07-19 PROCEDURE — G0299 HHS/HOSPICE OF RN EA 15 MIN: HCPCS

## 2021-07-19 PROCEDURE — A4333 URINARY CATH ANCHOR DEVICE: HCPCS

## 2021-07-19 PROCEDURE — 400014 HH F/U

## 2021-07-19 PROCEDURE — A6212 FOAM DRG <=16 SQ IN W/BORDER: HCPCS

## 2021-07-19 PROCEDURE — 3331090001 HH PPS REVENUE CREDIT

## 2021-07-19 PROCEDURE — 3331090002 HH PPS REVENUE DEBIT

## 2021-07-20 PROCEDURE — 3331090001 HH PPS REVENUE CREDIT

## 2021-07-20 PROCEDURE — 3331090002 HH PPS REVENUE DEBIT

## 2021-07-21 ENCOUNTER — HOME CARE VISIT (OUTPATIENT)
Dept: SCHEDULING | Facility: HOME HEALTH | Age: 76
End: 2021-07-21
Payer: MEDICARE

## 2021-07-21 PROCEDURE — G0299 HHS/HOSPICE OF RN EA 15 MIN: HCPCS

## 2021-07-21 PROCEDURE — 3331090002 HH PPS REVENUE DEBIT

## 2021-07-21 PROCEDURE — 3331090001 HH PPS REVENUE CREDIT

## 2021-07-22 VITALS
OXYGEN SATURATION: 100 % | TEMPERATURE: 97.3 F | RESPIRATION RATE: 16 BRPM | HEART RATE: 88 BPM | SYSTOLIC BLOOD PRESSURE: 112 MMHG | DIASTOLIC BLOOD PRESSURE: 68 MMHG

## 2021-07-22 PROCEDURE — 3331090002 HH PPS REVENUE DEBIT

## 2021-07-22 PROCEDURE — 3331090001 HH PPS REVENUE CREDIT

## 2021-07-23 ENCOUNTER — HOSPITAL ENCOUNTER (OUTPATIENT)
Dept: CT IMAGING | Age: 76
Discharge: HOME | End: 2021-07-23
Attending: UROLOGY
Payer: MEDICARE

## 2021-07-23 ENCOUNTER — HOME CARE VISIT (OUTPATIENT)
Dept: SCHEDULING | Facility: HOME HEALTH | Age: 76
End: 2021-07-23
Payer: MEDICARE

## 2021-07-23 DIAGNOSIS — N13.30 HYDRONEPHROSIS OF RIGHT KIDNEY: ICD-10-CM

## 2021-07-23 PROCEDURE — 3331090001 HH PPS REVENUE CREDIT

## 2021-07-23 PROCEDURE — G0299 HHS/HOSPICE OF RN EA 15 MIN: HCPCS

## 2021-07-23 PROCEDURE — 3331090002 HH PPS REVENUE DEBIT

## 2021-07-23 PROCEDURE — 74176 CT ABD & PELVIS W/O CONTRAST: CPT

## 2021-07-24 PROCEDURE — 3331090002 HH PPS REVENUE DEBIT

## 2021-07-24 PROCEDURE — 3331090001 HH PPS REVENUE CREDIT

## 2021-07-25 PROCEDURE — 3331090002 HH PPS REVENUE DEBIT

## 2021-07-25 PROCEDURE — 3331090001 HH PPS REVENUE CREDIT

## 2021-07-26 ENCOUNTER — HOME CARE VISIT (OUTPATIENT)
Dept: SCHEDULING | Facility: HOME HEALTH | Age: 76
End: 2021-07-26
Payer: MEDICARE

## 2021-07-26 VITALS
RESPIRATION RATE: 16 BRPM | HEART RATE: 76 BPM | OXYGEN SATURATION: 100 % | DIASTOLIC BLOOD PRESSURE: 74 MMHG | SYSTOLIC BLOOD PRESSURE: 120 MMHG | TEMPERATURE: 97.9 F

## 2021-07-26 VITALS
OXYGEN SATURATION: 98 % | TEMPERATURE: 97.9 F | DIASTOLIC BLOOD PRESSURE: 70 MMHG | HEART RATE: 70 BPM | RESPIRATION RATE: 16 BRPM | SYSTOLIC BLOOD PRESSURE: 122 MMHG

## 2021-07-26 PROCEDURE — 3331090002 HH PPS REVENUE DEBIT

## 2021-07-26 PROCEDURE — G0299 HHS/HOSPICE OF RN EA 15 MIN: HCPCS

## 2021-07-26 PROCEDURE — 3331090001 HH PPS REVENUE CREDIT

## 2021-07-26 NOTE — HOME HEALTH
Drain/ wound care performed per protocol . Patient tolerated well. Stat-lock dressing changed per protocol d/t saturation. Plan for next visit: drain/ wound care.

## 2021-07-27 PROCEDURE — 3331090001 HH PPS REVENUE CREDIT

## 2021-07-27 PROCEDURE — 3331090002 HH PPS REVENUE DEBIT

## 2021-07-28 ENCOUNTER — HOME CARE VISIT (OUTPATIENT)
Dept: SCHEDULING | Facility: HOME HEALTH | Age: 76
End: 2021-07-28
Payer: MEDICARE

## 2021-07-28 VITALS
OXYGEN SATURATION: 98 % | TEMPERATURE: 97.7 F | HEART RATE: 71 BPM | DIASTOLIC BLOOD PRESSURE: 70 MMHG | SYSTOLIC BLOOD PRESSURE: 106 MMHG | RESPIRATION RATE: 15 BRPM

## 2021-07-28 PROCEDURE — 3331090002 HH PPS REVENUE DEBIT

## 2021-07-28 PROCEDURE — G0299 HHS/HOSPICE OF RN EA 15 MIN: HCPCS

## 2021-07-28 PROCEDURE — 3331090001 HH PPS REVENUE CREDIT

## 2021-07-28 NOTE — HOME HEALTH
SN for DIANE drain management . Drain flushed per protocol. Dressing changed. Patent and draining small amounts of serosang. Sacaral dressing changed per protocol. Patient tolerated well . Physical assessment completed. No abnormalities above baseline noted. Plan for next visit: DIANE drain/ wound care.

## 2021-07-29 PROCEDURE — 3331090001 HH PPS REVENUE CREDIT

## 2021-07-29 PROCEDURE — 3331090002 HH PPS REVENUE DEBIT

## 2021-07-29 NOTE — PROGRESS NOTES
Pt called back after I left him a VM and also gave all instructions with his mom.  He states \"8:00 is too early, I will be there at 08:30\" States \"that's what I did before\"

## 2021-07-30 ENCOUNTER — HOME CARE VISIT (OUTPATIENT)
Dept: SCHEDULING | Facility: HOME HEALTH | Age: 76
End: 2021-07-30
Payer: MEDICARE

## 2021-07-30 PROCEDURE — 3331090001 HH PPS REVENUE CREDIT

## 2021-07-30 PROCEDURE — G0299 HHS/HOSPICE OF RN EA 15 MIN: HCPCS

## 2021-07-30 PROCEDURE — 3331090002 HH PPS REVENUE DEBIT

## 2021-07-31 VITALS
RESPIRATION RATE: 16 BRPM | DIASTOLIC BLOOD PRESSURE: 68 MMHG | HEART RATE: 90 BPM | SYSTOLIC BLOOD PRESSURE: 130 MMHG | OXYGEN SATURATION: 99 % | TEMPERATURE: 98.1 F

## 2021-07-31 PROCEDURE — 3331090001 HH PPS REVENUE CREDIT

## 2021-07-31 PROCEDURE — 3331090002 HH PPS REVENUE DEBIT

## 2021-07-31 NOTE — HOME HEALTH
DIANE drain care/ dressing change performed per protocol. Patient tolerated well. Plan for next visit: drain care/ dressing change.

## 2021-08-01 PROCEDURE — 3331090001 HH PPS REVENUE CREDIT

## 2021-08-01 PROCEDURE — 3331090002 HH PPS REVENUE DEBIT

## 2021-08-02 ENCOUNTER — HOME CARE VISIT (OUTPATIENT)
Dept: SCHEDULING | Facility: HOME HEALTH | Age: 76
End: 2021-08-02
Payer: MEDICARE

## 2021-08-02 PROCEDURE — G0299 HHS/HOSPICE OF RN EA 15 MIN: HCPCS

## 2021-08-02 PROCEDURE — 3331090002 HH PPS REVENUE DEBIT

## 2021-08-02 PROCEDURE — 3331090001 HH PPS REVENUE CREDIT

## 2021-08-03 VITALS
OXYGEN SATURATION: 100 % | TEMPERATURE: 97.8 F | RESPIRATION RATE: 16 BRPM | HEART RATE: 90 BPM | DIASTOLIC BLOOD PRESSURE: 70 MMHG | SYSTOLIC BLOOD PRESSURE: 128 MMHG

## 2021-08-03 PROBLEM — F32.A DEPRESSION: Status: RESOLVED | Noted: 2021-05-11 | Resolved: 2021-08-03

## 2021-08-03 PROCEDURE — 3331090002 HH PPS REVENUE DEBIT

## 2021-08-03 PROCEDURE — A4333 URINARY CATH ANCHOR DEVICE: HCPCS

## 2021-08-03 PROCEDURE — A6216 NON-STERILE GAUZE<=16 SQ IN: HCPCS

## 2021-08-03 PROCEDURE — A4452 WATERPROOF TAPE: HCPCS

## 2021-08-03 PROCEDURE — 3331090001 HH PPS REVENUE CREDIT

## 2021-08-03 PROCEDURE — A6252 ABSORPT DRG >16 <=48 W/O BDR: HCPCS

## 2021-08-03 NOTE — HOME HEALTH
Wound care / DIANE drain performed per protocol. Patient tolerated well. Stat - lock dressing changed. Maceration to skin beneath dressing. SKin prep applied. SN instructed pt to keep area dry with understanding verbalized. Plan for next visit: DIANE drain.  obtained MD approval of Stage 1 Pressure Injury to sacral area  from Dr Anuj Israel on 8/3/21 at [id number] [id number].

## 2021-08-04 ENCOUNTER — HOME CARE VISIT (OUTPATIENT)
Dept: SCHEDULING | Facility: HOME HEALTH | Age: 76
End: 2021-08-04
Payer: MEDICARE

## 2021-08-04 PROCEDURE — G0299 HHS/HOSPICE OF RN EA 15 MIN: HCPCS

## 2021-08-04 PROCEDURE — 3331090001 HH PPS REVENUE CREDIT

## 2021-08-04 PROCEDURE — 3331090002 HH PPS REVENUE DEBIT

## 2021-08-05 ENCOUNTER — HOSPITAL ENCOUNTER (OUTPATIENT)
Dept: INTERVENTIONAL RADIOLOGY/VASCULAR | Age: 76
Discharge: HOME | End: 2021-08-05
Attending: RADIOLOGY
Payer: MEDICARE

## 2021-08-05 VITALS
HEART RATE: 62 BPM | TEMPERATURE: 97.6 F | BODY MASS INDEX: 18.94 KG/M2 | HEIGHT: 68 IN | RESPIRATION RATE: 16 BRPM | SYSTOLIC BLOOD PRESSURE: 160 MMHG | WEIGHT: 125 LBS | DIASTOLIC BLOOD PRESSURE: 77 MMHG | OXYGEN SATURATION: 98 %

## 2021-08-05 DIAGNOSIS — L02.91 ABSCESS: ICD-10-CM

## 2021-08-05 PROCEDURE — 49423 EXCHANGE DRAINAGE CATHETER: CPT

## 2021-08-05 PROCEDURE — C1729 CATH, DRAINAGE: HCPCS

## 2021-08-05 PROCEDURE — 3331090001 HH PPS REVENUE CREDIT

## 2021-08-05 PROCEDURE — 77030002916 HC SUT ETHLN J&J -A

## 2021-08-05 PROCEDURE — 3331090002 HH PPS REVENUE DEBIT

## 2021-08-05 PROCEDURE — 74011000250 HC RX REV CODE- 250: Performed by: PHYSICIAN ASSISTANT

## 2021-08-05 PROCEDURE — 74011000636 HC RX REV CODE- 636: Performed by: PHYSICIAN ASSISTANT

## 2021-08-05 PROCEDURE — 77030027478 HC TBNG JP W BLB MRTM -B

## 2021-08-05 PROCEDURE — C1769 GUIDE WIRE: HCPCS

## 2021-08-05 RX ORDER — LIDOCAINE HYDROCHLORIDE 20 MG/ML
2-20 INJECTION, SOLUTION INFILTRATION; PERINEURAL ONCE
Status: COMPLETED | OUTPATIENT
Start: 2021-08-05 | End: 2021-08-05

## 2021-08-05 RX ADMIN — IOPAMIDOL 3 ML: 612 INJECTION, SOLUTION INTRAVENOUS at 09:10

## 2021-08-05 RX ADMIN — LIDOCAINE HYDROCHLORIDE 150 MG: 20 INJECTION, SOLUTION INFILTRATION; PERINEURAL at 09:05

## 2021-08-05 NOTE — DISCHARGE INSTRUCTIONS
[address]  Department of Interventional Radiology  [facility]  [phone] Office  [phone] Fax    GENERAL DRAIN DISCHARGE INSTRUCTIONS    General Information:     A plastic catheter has been inserted through your skin and into area that needs to be drained. Your doctor ordered this procedure to be done in the event of an abscess, or other fluid collection in your body. You will notice a drainage bag attached to the catheter. When the fluid has all been removed, your doctor will send you back to us for the removal of the catheter. If you are going home with the catheter in place, your doctor may order a home health nurse to come to your house and assist with the care of the catheter. Your doctor will most likely order antibiotic tablets for you to take while you have this tube. Home Care Instructions: You can resume your regular diet and medication regimen. Do not drink alcohol, drive, or make any important legal decisions in the next 24 hours. Do not lift anything heavier than a gallon of milk, or do anything strenuous for the next 24 hours. You should not shower for 24 hours. You should cover the tube with plastic wrap and tape to keep it dry when you shower. You can disconnect the drainage bag while showering. The home health nurse or the nurse who discharges from the hospital will teach you how to do this. Do not take a bath, swim or immerse yourself in water as long as you have this drainage tube. You should clean the tube and change the dressing every  48 hours and as needed. Keep the dressing clean and dry. Keep up with how much drainage you get from the tube and report this to your doctor on each visit. Call If:     You should call your Physician and/or the Radiology Nurse if you have any bleeding other than a small spot on your bandage.  Call if you have any signs of infection, fever, or increased pain at the site of the tube. Call if you should have leakage around the tube, an increased yellowing of the skin, increased pain in the abdomen, a change in the amount or appearance of the fluid, or if the tube gets pulled out some or all the way out. Follow-Up Instructions: Please see your ordering doctor as he/she has requested. DRAIN/PORT/CATHETER REMOVAL    DISCHARGE INSTRUCTIONS    General Information:     Your doctor has ordered for us to remove your drain, port, or catheter. This could be that you do not need it anymore, it is not doing its job, your physician has decided on another plan for your treatment and/or it may need replacing. Home Care Instructions: You can resume your regular diet and medication regimen. Do not drink alcohol, drive, or make any important legal decisions in the next 24 hours. Do not lift anything heavier than a gallon of milk, or do anything strenuous for the next 24 hours. You will notice a dressing over the site of the removal. This dressing should stay in place until the site is healed. The dressing should be changed at least every 48 hours. You should change the dressing sooner if it becomes soiled or wet. The nurse who discharges you to home should review with you any wound care instructions. Resume your normal level of activity slowly. You may shower after 24 hours, but do not take a bath or swim or immerse yourself in water until the site has healed, and keep the dressing dry with plastic wrap while showering. The site may ooze for a couple of days. This drainage should lessen with each passing day. Call If:     You should call your Physician and/or the Radiology Nurse if you have any bleeding other than a small spot on your bandage. Call if you have any signs of infection, fever, or increased pain at the site of the tube. Call if the oozing increases, if it changes color, or begins to have an odor.      Follow-Up Instructions: Please see your ordering doctor as he/she has requested. To Reach Us: If you have any questions about your procedure, please call the Interventional Radiology department at [phone]. After business hours (5pm) and weekends, call the answering service at [phone] and ask for the Radiologist on call to be paged. Si tiene Preguntas acerca del procedimiento, por favor llame al departamento de Radiología Intervencional al [phone]. Después de horas de oficina (5 pm) y los fines de [place], llamar al [facility] al [phone] y pregunte por el Radiologo de [place] [facility]. Interventional Radiology General Nurse Discharge    After general anesthesia or intravenous sedation, for 24 hours or while taking prescription Narcotics:  · Limit your activities  · Do not drive and operate hazardous machinery  · Do not make important personal or business decisions  · Do  not drink alcoholic beverages  · If you have not urinated within 8 hours after discharge, please contact your surgeon on call. * Please give a list of your current medications to your Primary Care Provider. * Please update this list whenever your medications are discontinued, doses are     changed, or new medications (including over-the-counter products) are added. * Please carry medication information at all times in case of emergency situations. These are general instructions for a healthy lifestyle:    No smoking/ No tobacco products/ Avoid exposure to second hand smoke  Surgeon General's Warning:  Quitting smoking now greatly reduces serious risk to your health.     Obesity, smoking, and sedentary lifestyle greatly increases your risk for illness  A healthy diet, regular physical exercise & weight monitoring are important for maintaining a healthy lifestyle    You may be retaining fluid if you have a history of heart failure or if you experience any of the following symptoms:  Weight gain of 3 pounds or more overnight or 5 pounds in a week, increased swelling in our hands or feet or shortness of breath while lying flat in bed. Please call your doctor as soon as you notice any of these symptoms; do not wait until your next office visit. Recognize signs and symptoms of STROKE:  F-face looks uneven    A-arms unable to move or move unevenly    S-speech slurred or non-existent    T-time-call 911 as soon as signs and symptoms begin-DO NOT go       Back to bed or wait to see if you get better-TIME IS BRAIN.       Patient Signature:  Date: 8/5/2021  Discharging Nurse: Evelina Toro RN

## 2021-08-05 NOTE — PROGRESS NOTES
Skin irritation noted to Existing Statlock device area. Patient states they have been using a different kind. Encouraged patient to use only the ones provided here in IR.

## 2021-08-06 ENCOUNTER — HOME CARE VISIT (OUTPATIENT)
Dept: SCHEDULING | Facility: HOME HEALTH | Age: 76
End: 2021-08-06
Payer: MEDICARE

## 2021-08-06 PROCEDURE — 3331090001 HH PPS REVENUE CREDIT

## 2021-08-06 PROCEDURE — G0299 HHS/HOSPICE OF RN EA 15 MIN: HCPCS

## 2021-08-06 PROCEDURE — 3331090002 HH PPS REVENUE DEBIT

## 2021-08-07 VITALS
HEART RATE: 83 BPM | SYSTOLIC BLOOD PRESSURE: 120 MMHG | TEMPERATURE: 97.3 F | RESPIRATION RATE: 16 BRPM | DIASTOLIC BLOOD PRESSURE: 64 MMHG | OXYGEN SATURATION: 98 %

## 2021-08-07 PROCEDURE — 3331090001 HH PPS REVENUE CREDIT

## 2021-08-07 PROCEDURE — 3331090002 HH PPS REVENUE DEBIT

## 2021-08-07 NOTE — HOME HEALTH
DIANE Drain/ wound care performed per protocol. Patient tolerated well. Patient has f/u with IR tomorrow. SN to f/u at next scheduled visit. Plan for next visit: DIANE drain/ wound care.

## 2021-08-08 VITALS
RESPIRATION RATE: 17 BRPM | DIASTOLIC BLOOD PRESSURE: 72 MMHG | SYSTOLIC BLOOD PRESSURE: 136 MMHG | OXYGEN SATURATION: 96 % | HEART RATE: 69 BPM | TEMPERATURE: 98.2 F

## 2021-08-08 PROCEDURE — 3331090001 HH PPS REVENUE CREDIT

## 2021-08-08 PROCEDURE — 3331090002 HH PPS REVENUE DEBIT

## 2021-08-09 ENCOUNTER — HOME CARE VISIT (OUTPATIENT)
Dept: SCHEDULING | Facility: HOME HEALTH | Age: 76
End: 2021-08-09
Payer: MEDICARE

## 2021-08-09 VITALS
HEART RATE: 98 BPM | TEMPERATURE: 97.8 F | SYSTOLIC BLOOD PRESSURE: 134 MMHG | OXYGEN SATURATION: 98 % | RESPIRATION RATE: 18 BRPM | DIASTOLIC BLOOD PRESSURE: 74 MMHG

## 2021-08-09 PROCEDURE — 3331090002 HH PPS REVENUE DEBIT

## 2021-08-09 PROCEDURE — G0299 HHS/HOSPICE OF RN EA 15 MIN: HCPCS

## 2021-08-09 PROCEDURE — 3331090001 HH PPS REVENUE CREDIT

## 2021-08-10 PROCEDURE — 3331090002 HH PPS REVENUE DEBIT

## 2021-08-10 PROCEDURE — 3331090001 HH PPS REVENUE CREDIT

## 2021-08-11 ENCOUNTER — HOME CARE VISIT (OUTPATIENT)
Dept: SCHEDULING | Facility: HOME HEALTH | Age: 76
End: 2021-08-11
Payer: MEDICARE

## 2021-08-11 VITALS
DIASTOLIC BLOOD PRESSURE: 60 MMHG | RESPIRATION RATE: 15 BRPM | HEART RATE: 86 BPM | OXYGEN SATURATION: 96 % | SYSTOLIC BLOOD PRESSURE: 116 MMHG | TEMPERATURE: 98.3 F

## 2021-08-11 PROCEDURE — G0299 HHS/HOSPICE OF RN EA 15 MIN: HCPCS

## 2021-08-11 PROCEDURE — 3331090002 HH PPS REVENUE DEBIT

## 2021-08-11 PROCEDURE — 3331090001 HH PPS REVENUE CREDIT

## 2021-08-11 NOTE — HOME HEALTH
Drain/wound care performed per protocol. Patient tolerated well. Physical assessment completed. No abnormalities above baseline noted. Education per POC provided. Plan for next visit: wound care.

## 2021-08-12 PROCEDURE — 3331090001 HH PPS REVENUE CREDIT

## 2021-08-12 PROCEDURE — 3331090002 HH PPS REVENUE DEBIT

## 2021-08-13 ENCOUNTER — HOME CARE VISIT (OUTPATIENT)
Dept: SCHEDULING | Facility: HOME HEALTH | Age: 76
End: 2021-08-13
Payer: MEDICARE

## 2021-08-13 VITALS
SYSTOLIC BLOOD PRESSURE: 106 MMHG | RESPIRATION RATE: 16 BRPM | TEMPERATURE: 97.7 F | DIASTOLIC BLOOD PRESSURE: 68 MMHG | HEART RATE: 86 BPM | OXYGEN SATURATION: 98 %

## 2021-08-13 PROCEDURE — 3331090002 HH PPS REVENUE DEBIT

## 2021-08-13 PROCEDURE — G0299 HHS/HOSPICE OF RN EA 15 MIN: HCPCS

## 2021-08-13 PROCEDURE — 3331090001 HH PPS REVENUE CREDIT

## 2021-08-14 PROCEDURE — 3331090001 HH PPS REVENUE CREDIT

## 2021-08-14 PROCEDURE — 3331090002 HH PPS REVENUE DEBIT

## 2021-08-15 PROCEDURE — 3331090001 HH PPS REVENUE CREDIT

## 2021-08-15 PROCEDURE — 3331090002 HH PPS REVENUE DEBIT

## 2021-08-16 ENCOUNTER — HOME CARE VISIT (OUTPATIENT)
Dept: SCHEDULING | Facility: HOME HEALTH | Age: 76
End: 2021-08-16
Payer: MEDICARE

## 2021-08-16 ENCOUNTER — HOME CARE VISIT (OUTPATIENT)
Dept: HOME HEALTH SERVICES | Facility: HOME HEALTH | Age: 76
End: 2021-08-16
Payer: MEDICARE

## 2021-08-16 VITALS
HEART RATE: 99 BPM | OXYGEN SATURATION: 100 % | RESPIRATION RATE: 16 BRPM | TEMPERATURE: 99 F | DIASTOLIC BLOOD PRESSURE: 60 MMHG | SYSTOLIC BLOOD PRESSURE: 110 MMHG

## 2021-08-16 PROCEDURE — 400018 HH-NO PAY CLAIM PROCEDURE

## 2021-08-16 PROCEDURE — G0299 HHS/HOSPICE OF RN EA 15 MIN: HCPCS

## 2021-08-16 PROCEDURE — 3331090001 HH PPS REVENUE CREDIT

## 2021-08-16 PROCEDURE — 3331090002 HH PPS REVENUE DEBIT

## 2021-08-16 PROCEDURE — 400014 HH F/U

## 2021-08-16 NOTE — HOME HEALTH
DIANE drain/ wound care performed per protocol. Patient tolerated well. Patient having PATs for upcoming surgery on Friday. [facility] for Dr Pat Ballard to return call to discuss patient's post-surgical care ; specifically, brito removal procedure. Plan for next visit: DIANE drain/wound care.

## 2021-08-17 PROCEDURE — 3331090001 HH PPS REVENUE CREDIT

## 2021-08-17 PROCEDURE — 3331090002 HH PPS REVENUE DEBIT

## 2021-08-18 ENCOUNTER — HOME CARE VISIT (OUTPATIENT)
Dept: SCHEDULING | Facility: HOME HEALTH | Age: 76
End: 2021-08-18
Payer: MEDICARE

## 2021-08-18 PROCEDURE — 3331090001 HH PPS REVENUE CREDIT

## 2021-08-18 PROCEDURE — G0299 HHS/HOSPICE OF RN EA 15 MIN: HCPCS

## 2021-08-18 PROCEDURE — 3331090002 HH PPS REVENUE DEBIT

## 2021-08-19 VITALS
HEART RATE: 82 BPM | RESPIRATION RATE: 16 BRPM | DIASTOLIC BLOOD PRESSURE: 58 MMHG | TEMPERATURE: 96.5 F | OXYGEN SATURATION: 98 % | SYSTOLIC BLOOD PRESSURE: 102 MMHG

## 2021-08-19 PROCEDURE — 3331090001 HH PPS REVENUE CREDIT

## 2021-08-19 PROCEDURE — 3331090002 HH PPS REVENUE DEBIT

## 2021-08-19 NOTE — HOME HEALTH
DIANE drain/ wound care performed per protocol. Patient tolerated well. Patient having PAT's on 8/20.      Plan for next visit: DIANE drain/ wound care

## 2021-08-20 ENCOUNTER — HOME CARE VISIT (OUTPATIENT)
Dept: SCHEDULING | Facility: HOME HEALTH | Age: 76
End: 2021-08-20
Payer: MEDICARE

## 2021-08-20 ENCOUNTER — HOSPITAL ENCOUNTER (OUTPATIENT)
Dept: SURGERY | Age: 76
Discharge: HOME | End: 2021-08-20
Payer: MEDICARE

## 2021-08-20 VITALS
WEIGHT: 132.3 LBS | BODY MASS INDEX: 20.05 KG/M2 | DIASTOLIC BLOOD PRESSURE: 78 MMHG | HEIGHT: 68 IN | OXYGEN SATURATION: 98 % | SYSTOLIC BLOOD PRESSURE: 139 MMHG | TEMPERATURE: 97.1 F | RESPIRATION RATE: 18 BRPM | HEART RATE: 85 BPM

## 2021-08-20 LAB
ANION GAP SERPL CALC-SCNC: 4 MMOL/L (ref 7–16)
ATRIAL RATE: 79 BPM
BASOPHILS # BLD: 0.1 K/UL (ref 0–0.2)
BASOPHILS NFR BLD: 1 % (ref 0–2)
BUN SERPL-MCNC: 31 MG/DL (ref 8–23)
CALCIUM SERPL-MCNC: 8.9 MG/DL (ref 8.3–10.4)
CALCULATED P AXIS, ECG09: 54 DEGREES
CALCULATED R AXIS, ECG10: 74 DEGREES
CALCULATED T AXIS, ECG11: 67 DEGREES
CHLORIDE SERPL-SCNC: 107 MMOL/L (ref 98–107)
CO2 SERPL-SCNC: 28 MMOL/L (ref 21–32)
CREAT SERPL-MCNC: 1.1 MG/DL (ref 0.8–1.5)
DIAGNOSIS, 93000: NORMAL
DIFFERENTIAL METHOD BLD: ABNORMAL
EOSINOPHIL # BLD: 0 K/UL (ref 0–0.8)
EOSINOPHIL NFR BLD: 1 % (ref 0.5–7.8)
ERYTHROCYTE [DISTWIDTH] IN BLOOD BY AUTOMATED COUNT: 14.7 % (ref 11.9–14.6)
GLUCOSE SERPL-MCNC: 93 MG/DL (ref 65–100)
HCT VFR BLD AUTO: 38.1 % (ref 41.1–50.3)
HGB BLD-MCNC: 11.7 G/DL (ref 13.6–17.2)
IMM GRANULOCYTES # BLD AUTO: 0 K/UL (ref 0–0.5)
IMM GRANULOCYTES NFR BLD AUTO: 0 % (ref 0–5)
LYMPHOCYTES # BLD: 0.5 K/UL (ref 0.5–4.6)
LYMPHOCYTES NFR BLD: 9 % (ref 13–44)
MCH RBC QN AUTO: 28.7 PG (ref 26.1–32.9)
MCHC RBC AUTO-ENTMCNC: 30.7 G/DL (ref 31.4–35)
MCV RBC AUTO: 93.6 FL (ref 79.6–97.8)
MONOCYTES # BLD: 0.4 K/UL (ref 0.1–1.3)
MONOCYTES NFR BLD: 8 % (ref 4–12)
NEUTS SEG # BLD: 4.1 K/UL (ref 1.7–8.2)
NEUTS SEG NFR BLD: 81 % (ref 43–78)
NRBC # BLD: 0 K/UL (ref 0–0.2)
P-R INTERVAL, ECG05: 172 MS
PLATELET # BLD AUTO: 214 K/UL (ref 150–450)
PMV BLD AUTO: 9.3 FL (ref 9.4–12.3)
POTASSIUM SERPL-SCNC: 4.7 MMOL/L (ref 3.5–5.1)
Q-T INTERVAL, ECG07: 382 MS
QRS DURATION, ECG06: 90 MS
QTC CALCULATION (BEZET), ECG08: 438 MS
RBC # BLD AUTO: 4.07 M/UL (ref 4.23–5.6)
SODIUM SERPL-SCNC: 139 MMOL/L (ref 138–145)
VENTRICULAR RATE, ECG03: 79 BPM
WBC # BLD AUTO: 5.1 K/UL (ref 4.3–11.1)

## 2021-08-20 PROCEDURE — G0299 HHS/HOSPICE OF RN EA 15 MIN: HCPCS

## 2021-08-20 PROCEDURE — 93005 ELECTROCARDIOGRAM TRACING: CPT

## 2021-08-20 PROCEDURE — 3331090001 HH PPS REVENUE CREDIT

## 2021-08-20 PROCEDURE — 85025 COMPLETE CBC W/AUTO DIFF WBC: CPT

## 2021-08-20 PROCEDURE — 80048 BASIC METABOLIC PNL TOTAL CA: CPT

## 2021-08-20 PROCEDURE — 3331090002 HH PPS REVENUE DEBIT

## 2021-08-20 NOTE — PERIOP NOTES
Recent Results (from the past 12 hour(s))   EKG, 12 LEAD, INITIAL    Collection Time: 08/20/21 12:12 PM   Result Value Ref Range    Ventricular Rate 79 BPM    Atrial Rate 79 BPM    P-R Interval 172 ms    QRS Duration 90 ms    Q-T Interval 382 ms    QTC Calculation (Bezet) 438 ms    Calculated P Axis 54 degrees    Calculated R Axis 74 degrees    Calculated T Axis 67 degrees    Diagnosis       Sinus rhythm with occasional Premature ventricular complexes  Otherwise normal ECG  When compared with ECG of 13-JUN-2019 15:57,  Premature ventricular complexes are now Present  Nonspecific T wave abnormality no longer evident in Inferior leads  Confirmed by Taras Banuelos ([id number]) on 8/20/2021 1:41:33 PM     CBC WITH AUTOMATED DIFF    Collection Time: 08/20/21 12:28 PM   Result Value Ref Range    WBC 5.1 4.3 - 11.1 K/uL    RBC 4.07 (L) 4.23 - 5.6 M/uL    HGB 11.7 (L) 13.6 - 17.2 g/dL    HCT 38.1 (L) 41.1 - 50.3 %    MCV 93.6 79.6 - 97.8 FL    MCH 28.7 26.1 - 32.9 PG    MCHC 30.7 (L) 31.4 - 35.0 g/dL    RDW 14.7 (H) 11.9 - 14.6 %    PLATELET 599 720 - 619 K/uL    MPV 9.3 (L) 9.4 - 12.3 FL    ABSOLUTE NRBC 0.00 0.0 - 0.2 K/uL    DF AUTOMATED      NEUTROPHILS 81 (H) 43 - 78 %    LYMPHOCYTES 9 (L) 13 - 44 %    MONOCYTES 8 4.0 - 12.0 %    EOSINOPHILS 1 0.5 - 7.8 %    BASOPHILS 1 0.0 - 2.0 %    IMMATURE GRANULOCYTES 0 0.0 - 5.0 %    ABS. NEUTROPHILS 4.1 1.7 - 8.2 K/UL    ABS. LYMPHOCYTES 0.5 0.5 - 4.6 K/UL    ABS. MONOCYTES 0.4 0.1 - 1.3 K/UL    ABS. EOSINOPHILS 0.0 0.0 - 0.8 K/UL    ABS. BASOPHILS 0.1 0.0 - 0.2 K/UL    ABS. IMM.  GRANS. 0.0 0.0 - 0.5 K/UL   METABOLIC PANEL, BASIC    Collection Time: 08/20/21 12:28 PM   Result Value Ref Range    Sodium 139 138 - 145 mmol/L    Potassium 4.7 3.5 - 5.1 mmol/L    Chloride 107 98 - 107 mmol/L    CO2 28 21 - 32 mmol/L    Anion gap 4 (L) 7 - 16 mmol/L    Glucose 93 65 - 100 mg/dL    BUN 31 (H) 8 - 23 MG/DL    Creatinine 1.10 0.8 - 1.5 MG/DL    GFR est AA >60 >60 ml/min/1.73m2    GFR est non-AA >60 >60 ml/min/1.73m2    Calcium 8.9 8.3 - 10.4 MG/DL

## 2021-08-20 NOTE — PERIOP NOTES
PLEASE CONTINUE TAKING ALL PRESCRIPTION MEDICATIONS UP TO THE DAY OF SURGERY UNLESS OTHERWISE DIRECTED BELOW. DISCONTINUE all vitamins and supplements 7 days prior to surgery. DISCONTINUE Non-Steriodal Anti-Inflammatory (NSAIDS) such as Advil and Aleve 5 days prior to surgery. Home Medications to take  the day of surgery    Levothyroxin           Home Medications   to Hold   Vitamin D        Comments    Covid test 8/24/21 @ 2 [address]    On the day before surgery please take Acetaminophen 1000mg in the morning and then again before bed. You may substitute for Tylenol 650 mg. Please do not bring home medications with you on the day of surgery unless otherwise directed by your nurse. If you are instructed to bring home medications, please give them to your nurse as they will be administered by the nursing staff. If you have any questions, please call [facility] [phone] or [facility] [phone]. A copy of this note was provided to the patient for reference.

## 2021-08-20 NOTE — PERIOP NOTES
Patient verified name and     Order for consent not found in EHR and matches case posting; patient verified. Type 3 surgery, walk in assessment complete. Labs per surgeon: none;   Labs per anesthesia protocol: cbc,bmp,T/S DOS; results pending  EK21   Dr Kaur Edwards notified of pt surgery length and health Hx . He does not need to see pt today. Patient COVID test date 21; . The testing center is located at the . [address] , [place]. If appointment is needed patient provided telephone number of [phone]. Hospital approved surgical skin cleanser and instructions given per hospital policy. Patient provided with and instructed on educational handouts including Guide to Surgery, Pain Management, Hand Hygiene, Blood Transfusion Education, and [facility] Brochure. Patient answered medical/surgical history questions at their best of ability. All prior to admission medications documented in [facility]. Original medication prescription bottle not visualized during patient appointment. Patient instructed to hold all vitamins 7 days prior to surgery and NSAIDS 5 days prior to surgery, patient verbalized understanding. Patient teach back successful and patient demonstrates knowledge of instructions.

## 2021-08-21 PROCEDURE — 3331090002 HH PPS REVENUE DEBIT

## 2021-08-21 PROCEDURE — 3331090001 HH PPS REVENUE CREDIT

## 2021-08-22 VITALS
SYSTOLIC BLOOD PRESSURE: 128 MMHG | TEMPERATURE: 98.1 F | RESPIRATION RATE: 16 BRPM | OXYGEN SATURATION: 95 % | DIASTOLIC BLOOD PRESSURE: 66 MMHG | HEART RATE: 75 BPM

## 2021-08-22 PROCEDURE — 3331090001 HH PPS REVENUE CREDIT

## 2021-08-22 PROCEDURE — 3331090002 HH PPS REVENUE DEBIT

## 2021-08-23 ENCOUNTER — HOME CARE VISIT (OUTPATIENT)
Dept: SCHEDULING | Facility: HOME HEALTH | Age: 76
End: 2021-08-23
Payer: MEDICARE

## 2021-08-23 PROCEDURE — 3331090001 HH PPS REVENUE CREDIT

## 2021-08-23 PROCEDURE — G0299 HHS/HOSPICE OF RN EA 15 MIN: HCPCS

## 2021-08-23 PROCEDURE — 3331090002 HH PPS REVENUE DEBIT

## 2021-08-24 PROCEDURE — 3331090001 HH PPS REVENUE CREDIT

## 2021-08-24 PROCEDURE — 3331090002 HH PPS REVENUE DEBIT

## 2021-08-24 NOTE — HOME HEALTH
DIANE drain/ wound care performed per protocol. Patient tolerated well. Patient is having surgery on 8/27 . SN called surgeon again to request orders to irrigate urethra immediately following brito removal be placed on surgical orders per patient request. Patient stated this has been done in the past , per urologist, and is paramount in preventing urinary retention. SN left VM for Dr Marjorie Loving during [facility]. Plan for next visit: DIANE drain/ wound care.

## 2021-08-25 ENCOUNTER — HOME CARE VISIT (OUTPATIENT)
Dept: SCHEDULING | Facility: HOME HEALTH | Age: 76
End: 2021-08-25
Payer: MEDICARE

## 2021-08-25 VITALS
OXYGEN SATURATION: 99 % | SYSTOLIC BLOOD PRESSURE: 106 MMHG | TEMPERATURE: 97.3 F | RESPIRATION RATE: 16 BRPM | DIASTOLIC BLOOD PRESSURE: 66 MMHG | HEART RATE: 95 BPM

## 2021-08-25 PROCEDURE — G0299 HHS/HOSPICE OF RN EA 15 MIN: HCPCS

## 2021-08-25 PROCEDURE — 3331090002 HH PPS REVENUE DEBIT

## 2021-08-25 PROCEDURE — 3331090001 HH PPS REVENUE CREDIT

## 2021-08-25 NOTE — HOME HEALTH
for DIANE drain/wound care . Changed stat-lock . SLight irritation to stat-lock site. skin prep applied. Physical exam completed. No abnormalitites above baseline. VSS. Patient is having surgery on 8/27.  spoke with [facility]  to assist with patient's after care. Melany advised that patient advocate for a urologist consult - patient made aware. Plan for nex visit: BRITTANEY.

## 2021-08-26 ENCOUNTER — ANESTHESIA EVENT (OUTPATIENT)
Dept: SURGERY | Age: 76
DRG: 331 | End: 2021-08-26
Payer: MEDICARE

## 2021-08-26 PROCEDURE — 0TQB0ZZ REPAIR BLADDER, OPEN APPROACH: ICD-10-PCS | Performed by: SURGERY

## 2021-08-26 PROCEDURE — 0KXQ0Z1 TRANSFER RIGHT UPPER LEG MUSCLE WITH SUBCUTANEOUS TISSUE, OPEN APPROACH: ICD-10-PCS | Performed by: SURGERY

## 2021-08-26 PROCEDURE — 0DTP0ZZ RESECTION OF RECTUM, OPEN APPROACH: ICD-10-PCS | Performed by: SURGERY

## 2021-08-26 PROCEDURE — 0KRQ07Z REPLACEMENT OF RIGHT UPPER LEG MUSCLE WITH AUTOLOGOUS TISSUE SUBSTITUTE, OPEN APPROACH: ICD-10-PCS | Performed by: SURGERY

## 2021-08-26 PROCEDURE — 0DBM0ZZ EXCISION OF DESCENDING COLON, OPEN APPROACH: ICD-10-PCS | Performed by: SURGERY

## 2021-08-26 PROCEDURE — 3331090001 HH PPS REVENUE CREDIT

## 2021-08-26 PROCEDURE — 3331090002 HH PPS REVENUE DEBIT

## 2021-08-27 ENCOUNTER — ANESTHESIA (OUTPATIENT)
Dept: SURGERY | Age: 76
DRG: 331 | End: 2021-08-27
Payer: MEDICARE

## 2021-08-27 ENCOUNTER — HOSPITAL ENCOUNTER (INPATIENT)
Age: 76
LOS: 7 days | Discharge: HOME HEALTH | DRG: 331 | End: 2021-09-03
Attending: SURGERY | Admitting: SURGERY
Payer: MEDICARE

## 2021-08-27 ENCOUNTER — HOME CARE VISIT (OUTPATIENT)
Dept: SCHEDULING | Facility: HOME HEALTH | Age: 76
End: 2021-08-27
Payer: MEDICARE

## 2021-08-27 DIAGNOSIS — Z87.898 H/O URINARY RETENTION: ICD-10-CM

## 2021-08-27 DIAGNOSIS — K60.4 RECTAL FISTULA: ICD-10-CM

## 2021-08-27 DIAGNOSIS — N32.1 COLOVESICAL FISTULA: ICD-10-CM

## 2021-08-27 DIAGNOSIS — Z93.3 COLOSTOMY IN PLACE (HCC): ICD-10-CM

## 2021-08-27 DIAGNOSIS — Z92.3 HISTORY OF EXTERNAL BEAM RADIATION THERAPY: ICD-10-CM

## 2021-08-27 DIAGNOSIS — Z85.048 HISTORY OF RECTAL CANCER: ICD-10-CM

## 2021-08-27 DIAGNOSIS — R15.1 FECAL SMEARING: ICD-10-CM

## 2021-08-27 LAB
ALBUMIN SERPL-MCNC: 2.9 G/DL (ref 3.2–4.6)
ALBUMIN/GLOB SERPL: 1.3 {RATIO} (ref 1.2–3.5)
ALP SERPL-CCNC: 47 U/L (ref 50–136)
ALT SERPL-CCNC: 24 U/L (ref 12–65)
ANION GAP SERPL CALC-SCNC: 6 MMOL/L (ref 7–16)
APTT PPP: 28.1 SEC (ref 24.1–35.1)
APTT PPP: 32.5 SEC (ref 24.1–35.1)
APTT PPP: 62.9 SEC (ref 24.1–35.1)
AST SERPL-CCNC: 17 U/L (ref 15–37)
BASE DEFICIT BLD-SCNC: 0.8 MMOL/L
BASE DEFICIT BLD-SCNC: 2.6 MMOL/L
BASE DEFICIT BLD-SCNC: 3.9 MMOL/L
BASE DEFICIT BLD-SCNC: 5.8 MMOL/L
BILIRUB SERPL-MCNC: 0.3 MG/DL (ref 0.2–1.1)
BUN SERPL-MCNC: 23 MG/DL (ref 8–23)
CA-I BLD-MCNC: 0.99 MMOL/L (ref 1.12–1.32)
CA-I BLD-MCNC: 1.01 MMOL/L (ref 1.12–1.32)
CA-I BLD-MCNC: 1.11 MMOL/L (ref 1.12–1.32)
CA-I BLD-MCNC: 1.21 MMOL/L (ref 1.12–1.32)
CALCIUM SERPL-MCNC: 7.6 MG/DL (ref 8.3–10.4)
CHLORIDE SERPL-SCNC: 113 MMOL/L (ref 98–107)
CITRATED FUNCTIONAL FIBRINOGEN MAXIMUM AMPLITUDE: 11.8 MM (ref 15–32)
CITRATED FUNCTIONAL FIBRINOGEN MAXIMUM AMPLITUDE: 13.3 MM (ref 15–32)
CITRATED FUNCTIONAL FIBRINOGEN MAXIMUM AMPLITUDE: 14.5 MM (ref 15–32)
CITRATED KAOLIN ANGLE: 70.8 DEG (ref 63–78)
CITRATED KAOLIN K-TIME: 1.8 MINS (ref 0.8–2.1)
CITRATED KAOLIN LY30: 0 % (ref 0–2.6)
CITRATED KAOLIN LY30: 0 % (ref 0–2.6)
CITRATED KAOLIN MAXIMUM AMPLITUDE: 49.7 MM (ref 52–69)
CITRATED KAOLIN R-TIME: 3.2 MINS (ref 4.6–9.1)
CITRATED KAOLIN R-TIME: 4.1 MINS (ref 4.6–9.1)
CITRATED KAOLIN R-TIME: 4.2 MINS (ref 4.6–9.1)
CITRATED KAOLIN/HEPARINASE R-TIME: 3 MINS (ref 4.3–8.3)
CITRATED RAPIDTEG MAXIMUM AMPLITUDE: 48.7 MM (ref 52–70)
CITRATED RAPIDTEG MAXIMUM AMPLITUDE: 51.2 MM (ref 52–70)
CITRATED RAPIDTEG MAXIMUM AMPLITUDE: 51.7 MM (ref 52–70)
CO2 BLD-SCNC: 20 MMOL/L (ref 13–23)
CO2 BLD-SCNC: 23 MMOL/L (ref 13–23)
CO2 BLD-SCNC: 24 MMOL/L (ref 13–23)
CO2 BLD-SCNC: 25 MMOL/L (ref 13–23)
CO2 SERPL-SCNC: 22 MMOL/L (ref 21–32)
CREAT SERPL-MCNC: 1.01 MG/DL (ref 0.8–1.5)
ERYTHROCYTE [DISTWIDTH] IN BLOOD BY AUTOMATED COUNT: 14.2 % (ref 11.9–14.6)
ERYTHROCYTE [DISTWIDTH] IN BLOOD BY AUTOMATED COUNT: 14.3 % (ref 11.9–14.6)
ERYTHROCYTE [DISTWIDTH] IN BLOOD BY AUTOMATED COUNT: 14.5 % (ref 11.9–14.6)
FIBRINOGEN PPP-MCNC: 153 MG/DL (ref 190–501)
FIBRINOGEN PPP-MCNC: 165 MG/DL (ref 190–501)
FUNCTIONAL FIBRINOGEN LEVEL: 215.3 MG/DL (ref 278–581)
GLOBULIN SER CALC-MCNC: 2.2 G/DL (ref 2.3–3.5)
GLUCOSE BLD STRIP.AUTO-MCNC: 113 MG/DL (ref 65–100)
GLUCOSE BLD STRIP.AUTO-MCNC: 131 MG/DL (ref 65–100)
GLUCOSE BLD STRIP.AUTO-MCNC: 142 MG/DL (ref 65–100)
GLUCOSE BLD STRIP.AUTO-MCNC: 190 MG/DL (ref 65–100)
GLUCOSE SERPL-MCNC: 127 MG/DL (ref 65–100)
HCO3 BLD-SCNC: 19.2 MMOL/L (ref 22–26)
HCO3 BLD-SCNC: 21.9 MMOL/L (ref 22–26)
HCO3 BLD-SCNC: 22.8 MMOL/L (ref 22–26)
HCO3 BLD-SCNC: 24.6 MMOL/L (ref 22–26)
HCT VFR BLD AUTO: 28.6 % (ref 41.1–50.3)
HCT VFR BLD AUTO: 30 % (ref 41.1–50.3)
HCT VFR BLD AUTO: 31.2 % (ref 41.1–50.3)
HGB BLD-MCNC: 9.1 G/DL (ref 13.6–17.2)
HGB BLD-MCNC: 9.3 G/DL (ref 13.6–17.2)
HGB BLD-MCNC: 9.7 G/DL (ref 13.6–17.2)
HISTORY CHECKED?,CKHIST: NORMAL
INR PPP: 1.2
INR PPP: 1.2
INR PPP: 1.3
MAGNESIUM SERPL-MCNC: 1.7 MG/DL (ref 1.8–2.4)
MCH RBC QN AUTO: 27.9 PG (ref 26.1–32.9)
MCH RBC QN AUTO: 28.5 PG (ref 26.1–32.9)
MCH RBC QN AUTO: 29 PG (ref 26.1–32.9)
MCHC RBC AUTO-ENTMCNC: 31 G/DL (ref 31.4–35)
MCHC RBC AUTO-ENTMCNC: 31.1 G/DL (ref 31.4–35)
MCHC RBC AUTO-ENTMCNC: 31.8 G/DL (ref 31.4–35)
MCV RBC AUTO: 89.7 FL (ref 79.6–97.8)
MCV RBC AUTO: 91.1 FL (ref 79.6–97.8)
MCV RBC AUTO: 92 FL (ref 79.6–97.8)
NRBC # BLD: 0 K/UL (ref 0–0.2)
PCO2 BLD: 35.1 MMHG (ref 35–45)
PCO2 BLD: 40.9 MMHG (ref 35–45)
PCO2 BLD: 41.7 MMHG (ref 35–45)
PCO2 BLD: 42.5 MMHG (ref 35–45)
PH BLD: 7.33 [PH] (ref 7.35–7.45)
PH BLD: 7.35 [PH] (ref 7.35–7.45)
PH BLD: 7.35 [PH] (ref 7.35–7.45)
PH BLD: 7.37 [PH] (ref 7.35–7.45)
PLATELET # BLD AUTO: 128 K/UL (ref 150–450)
PLATELET # BLD AUTO: 149 K/UL (ref 150–450)
PLATELET # BLD AUTO: 151 K/UL (ref 150–450)
PMV BLD AUTO: 9.3 FL (ref 9.4–12.3)
PMV BLD AUTO: 9.8 FL (ref 9.4–12.3)
PMV BLD AUTO: 9.9 FL (ref 9.4–12.3)
PO2 BLD: 192 MMHG (ref 75–100)
PO2 BLD: 242 MMHG (ref 75–100)
PO2 BLD: 248 MMHG (ref 75–100)
PO2 BLD: 268 MMHG (ref 75–100)
POTASSIUM BLD-SCNC: 3.9 MMOL/L (ref 3.5–5.1)
POTASSIUM BLD-SCNC: 4.3 MMOL/L (ref 3.5–5.1)
POTASSIUM BLD-SCNC: 4.3 MMOL/L (ref 3.5–5.1)
POTASSIUM BLD-SCNC: 4.6 MMOL/L (ref 3.5–5.1)
POTASSIUM SERPL-SCNC: 3.9 MMOL/L (ref 3.5–5.1)
PROT SERPL-MCNC: 5.1 G/DL (ref 6.3–8.2)
PROTHROMBIN TIME: 15.1 SEC (ref 12.6–14.5)
PROTHROMBIN TIME: 15.5 SEC (ref 12.6–14.5)
PROTHROMBIN TIME: 16.3 SEC (ref 12.6–14.5)
RBC # BLD AUTO: 3.14 M/UL (ref 4.23–5.6)
RBC # BLD AUTO: 3.26 M/UL (ref 4.23–5.6)
RBC # BLD AUTO: 3.48 M/UL (ref 4.23–5.6)
SAO2 % BLD: 100 %
SERVICE CMNT-IMP: ABNORMAL
SODIUM BLD-SCNC: 139 MMOL/L (ref 136–145)
SODIUM BLD-SCNC: 140 MMOL/L (ref 136–145)
SODIUM BLD-SCNC: 141 MMOL/L (ref 136–145)
SODIUM BLD-SCNC: 141 MMOL/L (ref 136–145)
SODIUM SERPL-SCNC: 141 MMOL/L (ref 138–145)
SPECIMEN SITE: ABNORMAL
WBC # BLD AUTO: 4.7 K/UL (ref 4.3–11.1)
WBC # BLD AUTO: 6.4 K/UL (ref 4.3–11.1)
WBC # BLD AUTO: 9.9 K/UL (ref 4.3–11.1)

## 2021-08-27 PROCEDURE — 77030002996 HC SUT SLK J&J -A: Performed by: SURGERY

## 2021-08-27 PROCEDURE — 85730 THROMBOPLASTIN TIME PARTIAL: CPT

## 2021-08-27 PROCEDURE — 85384 FIBRINOGEN ACTIVITY: CPT

## 2021-08-27 PROCEDURE — 76010000184 HC OR TIME 8.5 TO 9 HR INTENSV-TIER 1: Performed by: SURGERY

## 2021-08-27 PROCEDURE — 2709999900 HC NON-CHARGEABLE SUPPLY: Performed by: SURGERY

## 2021-08-27 PROCEDURE — 74011000250 HC RX REV CODE- 250: Performed by: NURSE ANESTHETIST, CERTIFIED REGISTERED

## 2021-08-27 PROCEDURE — 77030003028 HC SUT VCRL J&J -A: Performed by: SURGERY

## 2021-08-27 PROCEDURE — 77030011264 HC ELECTRD BLD EXT COVD -A: Performed by: SURGERY

## 2021-08-27 PROCEDURE — 36415 COLL VENOUS BLD VENIPUNCTURE: CPT

## 2021-08-27 PROCEDURE — 51865 REPAIR OF BLADDER WOUND: CPT | Performed by: SURGERY

## 2021-08-27 PROCEDURE — 85576 BLOOD PLATELET AGGREGATION: CPT

## 2021-08-27 PROCEDURE — 77030040361 HC SLV COMPR DVT MDII -B: Performed by: SURGERY

## 2021-08-27 PROCEDURE — 76210000016 HC OR PH I REC 1 TO 1.5 HR: Performed by: SURGERY

## 2021-08-27 PROCEDURE — 82947 ASSAY GLUCOSE BLOOD QUANT: CPT

## 2021-08-27 PROCEDURE — P9012 CRYOPRECIPITATE EACH UNIT: HCPCS

## 2021-08-27 PROCEDURE — 77030040504 HC DRN WND MDII -B: Performed by: SURGERY

## 2021-08-27 PROCEDURE — 77030012317 HC CATH URET INT COVD -A: Performed by: SURGERY

## 2021-08-27 PROCEDURE — 74011000258 HC RX REV CODE- 258: Performed by: SURGERY

## 2021-08-27 PROCEDURE — 85610 PROTHROMBIN TIME: CPT

## 2021-08-27 PROCEDURE — 84295 ASSAY OF SERUM SODIUM: CPT

## 2021-08-27 PROCEDURE — 88307 TISSUE EXAM BY PATHOLOGIST: CPT

## 2021-08-27 PROCEDURE — 74011250636 HC RX REV CODE- 250/636: Performed by: SURGERY

## 2021-08-27 PROCEDURE — 74011250636 HC RX REV CODE- 250/636: Performed by: PHYSICIAN ASSISTANT

## 2021-08-27 PROCEDURE — 74011250636 HC RX REV CODE- 250/636: Performed by: ANESTHESIOLOGY

## 2021-08-27 PROCEDURE — 74011250637 HC RX REV CODE- 250/637: Performed by: PHYSICIAN ASSISTANT

## 2021-08-27 PROCEDURE — 77030036731 HC STPLR ENDOSC J&J -F: Performed by: SURGERY

## 2021-08-27 PROCEDURE — 65270000029 HC RM PRIVATE

## 2021-08-27 PROCEDURE — 77030008459 HC STPLR SKN COOP -B: Performed by: SURGERY

## 2021-08-27 PROCEDURE — 77030002986 HC SUT PROL J&J -A: Performed by: SURGERY

## 2021-08-27 PROCEDURE — 83735 ASSAY OF MAGNESIUM: CPT

## 2021-08-27 PROCEDURE — 74011250636 HC RX REV CODE- 250/636: Performed by: NURSE ANESTHETIST, CERTIFIED REGISTERED

## 2021-08-27 PROCEDURE — 77030031304 HC WAVWGD EIGR DISP INVO -D: Performed by: SURGERY

## 2021-08-27 PROCEDURE — 77030013292 HC BOWL MX PRSM J&J -A: Performed by: ANESTHESIOLOGY

## 2021-08-27 PROCEDURE — 77030020751 HC FLTR TBNG TRNSFUS HAEM -A: Performed by: ANESTHESIOLOGY

## 2021-08-27 PROCEDURE — 77030040506 HC DRN WND MDII -A: Performed by: SURGERY

## 2021-08-27 PROCEDURE — 82803 BLOOD GASES ANY COMBINATION: CPT

## 2021-08-27 PROCEDURE — 74011000258 HC RX REV CODE- 258: Performed by: ANESTHESIOLOGY

## 2021-08-27 PROCEDURE — 85027 COMPLETE CBC AUTOMATED: CPT

## 2021-08-27 PROCEDURE — 74011000258 HC RX REV CODE- 258: Performed by: PHYSICIAN ASSISTANT

## 2021-08-27 PROCEDURE — 77030031139 HC SUT VCRL2 J&J -A: Performed by: SURGERY

## 2021-08-27 PROCEDURE — 3331090002 HH PPS REVENUE DEBIT

## 2021-08-27 PROCEDURE — 77030013708 HC HNDPC SUC IRR PULS STRY –B: Performed by: SURGERY

## 2021-08-27 PROCEDURE — P9045 ALBUMIN (HUMAN), 5%, 250 ML: HCPCS | Performed by: NURSE ANESTHETIST, CERTIFIED REGISTERED

## 2021-08-27 PROCEDURE — 3331090001 HH PPS REVENUE CREDIT

## 2021-08-27 PROCEDURE — 74011000250 HC RX REV CODE- 250: Performed by: ANESTHESIOLOGY

## 2021-08-27 PROCEDURE — 77030039425 HC BLD LARYNG TRULITE DISP TELE -A: Performed by: ANESTHESIOLOGY

## 2021-08-27 PROCEDURE — 45110 REMOVAL OF RECTUM: CPT | Performed by: SURGERY

## 2021-08-27 PROCEDURE — 80053 COMPREHEN METABOLIC PANEL: CPT

## 2021-08-27 PROCEDURE — 77030014008 HC SPNG HEMSTAT J&J -C: Performed by: SURGERY

## 2021-08-27 PROCEDURE — 77030002933 HC SUT MCRYL J&J -A: Performed by: SURGERY

## 2021-08-27 PROCEDURE — 77030013794 HC KT TRNSDUC BLD EDWD -B: Performed by: ANESTHESIOLOGY

## 2021-08-27 PROCEDURE — 76060000048 HC ANESTHESIA 8.5 TO 9 HR: Performed by: SURGERY

## 2021-08-27 PROCEDURE — 77030034849: Performed by: SURGERY

## 2021-08-27 PROCEDURE — 77030019908 HC STETH ESOPH SIMS -A: Performed by: ANESTHESIOLOGY

## 2021-08-27 PROCEDURE — 77030002888 HC SUT CHRMC J&J -A: Performed by: SURGERY

## 2021-08-27 PROCEDURE — 30233M1 TRANSFUSION OF NONAUTOLOGOUS PLASMA CRYOPRECIPITATE INTO PERIPHERAL VEIN, PERCUTANEOUS APPROACH: ICD-10-PCS | Performed by: SURGERY

## 2021-08-27 PROCEDURE — 77030020407 HC IV BLD WRMR ST 3M -A: Performed by: ANESTHESIOLOGY

## 2021-08-27 PROCEDURE — 77030002916 HC SUT ETHLN J&J -A: Performed by: SURGERY

## 2021-08-27 PROCEDURE — 77030037088 HC TUBE ENDOTRACH ORAL NSL COVD-A: Performed by: ANESTHESIOLOGY

## 2021-08-27 PROCEDURE — 85347 COAGULATION TIME ACTIVATED: CPT

## 2021-08-27 PROCEDURE — 86901 BLOOD TYPING SEROLOGIC RH(D): CPT

## 2021-08-27 PROCEDURE — 74011000250 HC RX REV CODE- 250: Performed by: SURGERY

## 2021-08-27 PROCEDURE — P9016 RBC LEUKOCYTES REDUCED: HCPCS

## 2021-08-27 PROCEDURE — 30233N1 TRANSFUSION OF NONAUTOLOGOUS RED BLOOD CELLS INTO PERIPHERAL VEIN, PERCUTANEOUS APPROACH: ICD-10-PCS | Performed by: SURGERY

## 2021-08-27 PROCEDURE — 77030008462 HC STPLR SKN PROX J&J -A: Performed by: SURGERY

## 2021-08-27 PROCEDURE — 77030013629 HC ELECTRD NDL STRY -B: Performed by: SURGERY

## 2021-08-27 PROCEDURE — 77030005401 HC CATH RAD ARRO -A: Performed by: ANESTHESIOLOGY

## 2021-08-27 PROCEDURE — 99221 1ST HOSP IP/OBS SF/LOW 40: CPT | Performed by: NURSE PRACTITIONER

## 2021-08-27 PROCEDURE — 77030010512 HC APPL CLP LIG J&J -C: Performed by: SURGERY

## 2021-08-27 PROCEDURE — 77030040922 HC BLNKT HYPOTHRM STRY -A: Performed by: ANESTHESIOLOGY

## 2021-08-27 PROCEDURE — 86923 COMPATIBILITY TEST ELECTRIC: CPT

## 2021-08-27 RX ORDER — SODIUM CHLORIDE 9 MG/ML
INJECTION, SOLUTION INTRAVENOUS
Status: DISCONTINUED | OUTPATIENT
Start: 2021-08-27 | End: 2021-08-27 | Stop reason: HOSPADM

## 2021-08-27 RX ORDER — CLONAZEPAM 1 MG/1
6 TABLET ORAL
Status: DISCONTINUED | OUTPATIENT
Start: 2021-08-27 | End: 2021-09-03 | Stop reason: HOSPADM

## 2021-08-27 RX ORDER — FAMOTIDINE 20 MG/1
20 TABLET, FILM COATED ORAL 2 TIMES DAILY
Status: DISCONTINUED | OUTPATIENT
Start: 2021-08-27 | End: 2021-09-03 | Stop reason: HOSPADM

## 2021-08-27 RX ORDER — SODIUM CHLORIDE, SODIUM LACTATE, POTASSIUM CHLORIDE, CALCIUM CHLORIDE 600; 310; 30; 20 MG/100ML; MG/100ML; MG/100ML; MG/100ML
75 INJECTION, SOLUTION INTRAVENOUS CONTINUOUS
Status: DISCONTINUED | OUTPATIENT
Start: 2021-08-27 | End: 2021-08-27 | Stop reason: HOSPADM

## 2021-08-27 RX ORDER — OXYCODONE HYDROCHLORIDE 5 MG/1
5 TABLET ORAL
Status: DISCONTINUED | OUTPATIENT
Start: 2021-08-27 | End: 2021-08-27 | Stop reason: HOSPADM

## 2021-08-27 RX ORDER — HYDROMORPHONE HYDROCHLORIDE 2 MG/ML
0.5 INJECTION, SOLUTION INTRAMUSCULAR; INTRAVENOUS; SUBCUTANEOUS
Status: DISCONTINUED | OUTPATIENT
Start: 2021-08-27 | End: 2021-08-27 | Stop reason: HOSPADM

## 2021-08-27 RX ORDER — NALOXONE HYDROCHLORIDE 0.4 MG/ML
0.2 INJECTION, SOLUTION INTRAMUSCULAR; INTRAVENOUS; SUBCUTANEOUS
Status: DISCONTINUED | OUTPATIENT
Start: 2021-08-27 | End: 2021-08-27 | Stop reason: HOSPADM

## 2021-08-27 RX ORDER — LIDOCAINE HYDROCHLORIDE AND EPINEPHRINE 10; 10 MG/ML; UG/ML
INJECTION, SOLUTION INFILTRATION; PERINEURAL AS NEEDED
Status: DISCONTINUED | OUTPATIENT
Start: 2021-08-27 | End: 2021-08-27 | Stop reason: HOSPADM

## 2021-08-27 RX ORDER — KETAMINE HYDROCHLORIDE 50 MG/ML
INJECTION, SOLUTION INTRAMUSCULAR; INTRAVENOUS AS NEEDED
Status: DISCONTINUED | OUTPATIENT
Start: 2021-08-27 | End: 2021-08-27 | Stop reason: HOSPADM

## 2021-08-27 RX ORDER — LIDOCAINE HYDROCHLORIDE 20 MG/ML
INJECTION, SOLUTION EPIDURAL; INFILTRATION; INTRACAUDAL; PERINEURAL AS NEEDED
Status: DISCONTINUED | OUTPATIENT
Start: 2021-08-27 | End: 2021-08-27 | Stop reason: HOSPADM

## 2021-08-27 RX ORDER — LIDOCAINE HYDROCHLORIDE ANHYDROUS AND DEXTROSE MONOHYDRATE .8; 5 G/100ML; G/100ML
1 INJECTION, SOLUTION INTRAVENOUS CONTINUOUS
Status: DISPENSED | OUTPATIENT
Start: 2021-08-27 | End: 2021-08-28

## 2021-08-27 RX ORDER — ONDANSETRON 2 MG/ML
4 INJECTION INTRAMUSCULAR; INTRAVENOUS
Status: DISCONTINUED | OUTPATIENT
Start: 2021-08-27 | End: 2021-08-27 | Stop reason: HOSPADM

## 2021-08-27 RX ORDER — MAGNESIUM SULFATE HEPTAHYDRATE 40 MG/ML
INJECTION, SOLUTION INTRAVENOUS AS NEEDED
Status: DISCONTINUED | OUTPATIENT
Start: 2021-08-27 | End: 2021-08-27 | Stop reason: HOSPADM

## 2021-08-27 RX ORDER — LIDOCAINE HYDROCHLORIDE 10 MG/ML
0.1 INJECTION INFILTRATION; PERINEURAL AS NEEDED
Status: DISCONTINUED | OUTPATIENT
Start: 2021-08-27 | End: 2021-08-27 | Stop reason: HOSPADM

## 2021-08-27 RX ORDER — AMITRIPTYLINE HYDROCHLORIDE 50 MG/1
100 TABLET, FILM COATED ORAL
Status: DISCONTINUED | OUTPATIENT
Start: 2021-08-27 | End: 2021-09-03 | Stop reason: HOSPADM

## 2021-08-27 RX ORDER — ONDANSETRON 2 MG/ML
4 INJECTION INTRAMUSCULAR; INTRAVENOUS
Status: DISCONTINUED | OUTPATIENT
Start: 2021-08-27 | End: 2021-09-03 | Stop reason: HOSPADM

## 2021-08-27 RX ORDER — SODIUM CHLORIDE 0.9 % (FLUSH) 0.9 %
5-40 SYRINGE (ML) INJECTION EVERY 8 HOURS
Status: DISCONTINUED | OUTPATIENT
Start: 2021-08-27 | End: 2021-09-03 | Stop reason: HOSPADM

## 2021-08-27 RX ORDER — DEXTROSE MONOHYDRATE AND SODIUM CHLORIDE 5; .45 G/100ML; G/100ML
50 INJECTION, SOLUTION INTRAVENOUS CONTINUOUS
Status: DISCONTINUED | OUTPATIENT
Start: 2021-08-27 | End: 2021-09-02

## 2021-08-27 RX ORDER — MIDAZOLAM HYDROCHLORIDE 1 MG/ML
2 INJECTION, SOLUTION INTRAMUSCULAR; INTRAVENOUS
Status: DISCONTINUED | OUTPATIENT
Start: 2021-08-27 | End: 2021-08-27 | Stop reason: HOSPADM

## 2021-08-27 RX ORDER — VECURONIUM BROMIDE FOR INJECTION 1 MG/ML
INJECTION, POWDER, LYOPHILIZED, FOR SOLUTION INTRAVENOUS AS NEEDED
Status: DISCONTINUED | OUTPATIENT
Start: 2021-08-27 | End: 2021-08-27 | Stop reason: HOSPADM

## 2021-08-27 RX ORDER — SODIUM CHLORIDE 9 MG/ML
250 INJECTION, SOLUTION INTRAVENOUS AS NEEDED
Status: DISCONTINUED | OUTPATIENT
Start: 2021-08-27 | End: 2021-09-03 | Stop reason: HOSPADM

## 2021-08-27 RX ORDER — SODIUM CHLORIDE, SODIUM LACTATE, POTASSIUM CHLORIDE, CALCIUM CHLORIDE 600; 310; 30; 20 MG/100ML; MG/100ML; MG/100ML; MG/100ML
25 INJECTION, SOLUTION INTRAVENOUS CONTINUOUS
Status: DISCONTINUED | OUTPATIENT
Start: 2021-08-27 | End: 2021-08-27 | Stop reason: HOSPADM

## 2021-08-27 RX ORDER — ACETAMINOPHEN 500 MG
1000 TABLET ORAL ONCE
Status: ACTIVE | OUTPATIENT
Start: 2021-08-27 | End: 2021-08-27

## 2021-08-27 RX ORDER — ALBUMIN HUMAN 50 G/1000ML
SOLUTION INTRAVENOUS AS NEEDED
Status: DISCONTINUED | OUTPATIENT
Start: 2021-08-27 | End: 2021-08-27 | Stop reason: HOSPADM

## 2021-08-27 RX ORDER — HALOPERIDOL 5 MG/ML
1 INJECTION INTRAMUSCULAR
Status: DISCONTINUED | OUTPATIENT
Start: 2021-08-27 | End: 2021-08-27 | Stop reason: HOSPADM

## 2021-08-27 RX ORDER — ROCURONIUM BROMIDE 10 MG/ML
INJECTION, SOLUTION INTRAVENOUS AS NEEDED
Status: DISCONTINUED | OUTPATIENT
Start: 2021-08-27 | End: 2021-08-27 | Stop reason: HOSPADM

## 2021-08-27 RX ORDER — PROPOFOL 10 MG/ML
INJECTION, EMULSION INTRAVENOUS AS NEEDED
Status: DISCONTINUED | OUTPATIENT
Start: 2021-08-27 | End: 2021-08-27 | Stop reason: HOSPADM

## 2021-08-27 RX ORDER — SODIUM CHLORIDE 0.9 % (FLUSH) 0.9 %
5-40 SYRINGE (ML) INJECTION AS NEEDED
Status: DISCONTINUED | OUTPATIENT
Start: 2021-08-27 | End: 2021-09-03 | Stop reason: HOSPADM

## 2021-08-27 RX ORDER — LEVOTHYROXINE SODIUM 125 UG/1
125 TABLET ORAL
Status: DISCONTINUED | OUTPATIENT
Start: 2021-08-28 | End: 2021-09-03 | Stop reason: HOSPADM

## 2021-08-27 RX ORDER — MIDAZOLAM HYDROCHLORIDE 1 MG/ML
2 INJECTION, SOLUTION INTRAMUSCULAR; INTRAVENOUS ONCE
Status: DISCONTINUED | OUTPATIENT
Start: 2021-08-27 | End: 2021-08-27 | Stop reason: HOSPADM

## 2021-08-27 RX ORDER — HYDROMORPHONE HYDROCHLORIDE 1 MG/ML
.5-2 INJECTION, SOLUTION INTRAMUSCULAR; INTRAVENOUS; SUBCUTANEOUS
Status: DISCONTINUED | OUTPATIENT
Start: 2021-08-27 | End: 2021-09-03 | Stop reason: HOSPADM

## 2021-08-27 RX ORDER — ONDANSETRON 4 MG/1
4 TABLET, ORALLY DISINTEGRATING ORAL
Status: DISCONTINUED | OUTPATIENT
Start: 2021-08-27 | End: 2021-09-03 | Stop reason: HOSPADM

## 2021-08-27 RX ORDER — EPHEDRINE SULFATE/0.9% NACL/PF 50 MG/5 ML
SYRINGE (ML) INTRAVENOUS AS NEEDED
Status: DISCONTINUED | OUTPATIENT
Start: 2021-08-27 | End: 2021-08-27 | Stop reason: HOSPADM

## 2021-08-27 RX ORDER — FENTANYL CITRATE 50 UG/ML
100 INJECTION, SOLUTION INTRAMUSCULAR; INTRAVENOUS ONCE
Status: DISCONTINUED | OUTPATIENT
Start: 2021-08-27 | End: 2021-08-27 | Stop reason: HOSPADM

## 2021-08-27 RX ORDER — CALCIUM CHLORIDE INJECTION 100 MG/ML
INJECTION, SOLUTION INTRAVENOUS AS NEEDED
Status: DISCONTINUED | OUTPATIENT
Start: 2021-08-27 | End: 2021-08-27 | Stop reason: HOSPADM

## 2021-08-27 RX ORDER — FENTANYL CITRATE 50 UG/ML
INJECTION, SOLUTION INTRAMUSCULAR; INTRAVENOUS AS NEEDED
Status: DISCONTINUED | OUTPATIENT
Start: 2021-08-27 | End: 2021-08-27 | Stop reason: HOSPADM

## 2021-08-27 RX ORDER — NALOXONE HYDROCHLORIDE 0.4 MG/ML
0.2 INJECTION, SOLUTION INTRAMUSCULAR; INTRAVENOUS; SUBCUTANEOUS AS NEEDED
Status: DISCONTINUED | OUTPATIENT
Start: 2021-08-27 | End: 2021-08-27 | Stop reason: HOSPADM

## 2021-08-27 RX ORDER — OXYCODONE HYDROCHLORIDE 5 MG/1
5 TABLET ORAL
Status: DISCONTINUED | OUTPATIENT
Start: 2021-08-27 | End: 2021-09-03 | Stop reason: HOSPADM

## 2021-08-27 RX ORDER — LIDOCAINE HYDROCHLORIDE ANHYDROUS AND DEXTROSE MONOHYDRATE .8; 5 G/100ML; G/100ML
INJECTION, SOLUTION INTRAVENOUS
Status: DISCONTINUED | OUTPATIENT
Start: 2021-08-27 | End: 2021-08-27 | Stop reason: HOSPADM

## 2021-08-27 RX ADMIN — TRANEXAMIC ACID 1 G: 100 INJECTION, SOLUTION INTRAVENOUS at 15:35

## 2021-08-27 RX ADMIN — KETAMINE HYDROCHLORIDE 40 MG: 50 INJECTION INTRAMUSCULAR; INTRAVENOUS at 08:41

## 2021-08-27 RX ADMIN — KETAMINE HYDROCHLORIDE 10 MG: 50 INJECTION INTRAMUSCULAR; INTRAVENOUS at 14:38

## 2021-08-27 RX ADMIN — SODIUM CHLORIDE: 900 INJECTION, SOLUTION INTRAVENOUS at 10:57

## 2021-08-27 RX ADMIN — Medication 10 MG: at 11:01

## 2021-08-27 RX ADMIN — HYDROMORPHONE HYDROCHLORIDE 0.5 MG: 2 INJECTION INTRAMUSCULAR; INTRAVENOUS; SUBCUTANEOUS at 17:37

## 2021-08-27 RX ADMIN — SODIUM CHLORIDE, SODIUM LACTATE, POTASSIUM CHLORIDE, AND CALCIUM CHLORIDE: 600; 310; 30; 20 INJECTION, SOLUTION INTRAVENOUS at 12:16

## 2021-08-27 RX ADMIN — TRANEXAMIC ACID 1 G: 100 INJECTION, SOLUTION INTRAVENOUS at 12:05

## 2021-08-27 RX ADMIN — CEFOXITIN SODIUM 2 G: 2 POWDER, FOR SOLUTION INTRAVENOUS at 16:36

## 2021-08-27 RX ADMIN — KETAMINE HYDROCHLORIDE 20 MG: 50 INJECTION INTRAMUSCULAR; INTRAVENOUS at 10:46

## 2021-08-27 RX ADMIN — CEFOXITIN SODIUM 2 G: 2 POWDER, FOR SOLUTION INTRAVENOUS at 09:00

## 2021-08-27 RX ADMIN — PHENYLEPHRINE HYDROCHLORIDE 20 MCG/MIN: 10 INJECTION INTRAVENOUS at 08:56

## 2021-08-27 RX ADMIN — DEXTROSE MONOHYDRATE 5 MCG/MIN: 5 INJECTION, SOLUTION INTRAVENOUS at 12:32

## 2021-08-27 RX ADMIN — ROCURONIUM BROMIDE 20 MG: 10 INJECTION, SOLUTION INTRAVENOUS at 09:10

## 2021-08-27 RX ADMIN — ALBUMIN (HUMAN) 250 ML: 12.5 INJECTION, SOLUTION INTRAVENOUS at 15:40

## 2021-08-27 RX ADMIN — Medication 20 MG: at 08:53

## 2021-08-27 RX ADMIN — PROPOFOL 180 MG: 10 INJECTION, EMULSION INTRAVENOUS at 08:41

## 2021-08-27 RX ADMIN — PHENYLEPHRINE HYDROCHLORIDE 120 MCG: 10 INJECTION INTRAVENOUS at 11:09

## 2021-08-27 RX ADMIN — VECURONIUM BROMIDE 3 MG: 1 INJECTION, POWDER, LYOPHILIZED, FOR SOLUTION INTRAVENOUS at 12:30

## 2021-08-27 RX ADMIN — LIDOCAINE HYDROCHLORIDE 1 MG/KG/HR: 8 INJECTION, SOLUTION INTRAVENOUS at 08:56

## 2021-08-27 RX ADMIN — KETAMINE HYDROCHLORIDE 10 MG: 50 INJECTION INTRAMUSCULAR; INTRAVENOUS at 13:38

## 2021-08-27 RX ADMIN — SODIUM CHLORIDE, SODIUM LACTATE, POTASSIUM CHLORIDE, AND CALCIUM CHLORIDE: 600; 310; 30; 20 INJECTION, SOLUTION INTRAVENOUS at 13:53

## 2021-08-27 RX ADMIN — SODIUM CHLORIDE: 9 INJECTION, SOLUTION INTRAVENOUS at 14:09

## 2021-08-27 RX ADMIN — CEFOXITIN SODIUM 2 G: 2 POWDER, FOR SOLUTION INTRAVENOUS at 12:50

## 2021-08-27 RX ADMIN — VECURONIUM BROMIDE 3 MG: 1 INJECTION, POWDER, LYOPHILIZED, FOR SOLUTION INTRAVENOUS at 13:56

## 2021-08-27 RX ADMIN — Medication 20 MG: at 08:51

## 2021-08-27 RX ADMIN — FENTANYL CITRATE 100 MCG: 50 INJECTION INTRAMUSCULAR; INTRAVENOUS at 08:41

## 2021-08-27 RX ADMIN — SODIUM CHLORIDE, SODIUM LACTATE, POTASSIUM CHLORIDE, AND CALCIUM CHLORIDE: 600; 310; 30; 20 INJECTION, SOLUTION INTRAVENOUS at 16:15

## 2021-08-27 RX ADMIN — PIPERACILLIN SODIUM AND TAZOBACTAM SODIUM 3.38 G: 3; .375 INJECTION, POWDER, LYOPHILIZED, FOR SOLUTION INTRAVENOUS at 23:55

## 2021-08-27 RX ADMIN — ALBUMIN (HUMAN) 250 ML: 12.5 INJECTION, SOLUTION INTRAVENOUS at 09:05

## 2021-08-27 RX ADMIN — SODIUM CHLORIDE: 900 INJECTION, SOLUTION INTRAVENOUS at 08:45

## 2021-08-27 RX ADMIN — MAGNESIUM SULFATE HEPTAHYDRATE 2 G: 40 INJECTION, SOLUTION INTRAVENOUS at 13:52

## 2021-08-27 RX ADMIN — VECURONIUM BROMIDE 1 MG: 1 INJECTION, POWDER, LYOPHILIZED, FOR SOLUTION INTRAVENOUS at 11:16

## 2021-08-27 RX ADMIN — KETAMINE HYDROCHLORIDE 20 MG: 50 INJECTION INTRAMUSCULAR; INTRAVENOUS at 09:46

## 2021-08-27 RX ADMIN — SODIUM CHLORIDE: 9 INJECTION, SOLUTION INTRAVENOUS at 13:07

## 2021-08-27 RX ADMIN — VECURONIUM BROMIDE 3 MG: 1 INJECTION, POWDER, LYOPHILIZED, FOR SOLUTION INTRAVENOUS at 10:28

## 2021-08-27 RX ADMIN — LIDOCAINE HYDROCHLORIDE 1 MG/KG/HR: 8 INJECTION, SOLUTION INTRAVENOUS at 19:08

## 2021-08-27 RX ADMIN — VECURONIUM BROMIDE 2 MG: 1 INJECTION, POWDER, LYOPHILIZED, FOR SOLUTION INTRAVENOUS at 15:00

## 2021-08-27 RX ADMIN — KETAMINE HYDROCHLORIDE 10 MG: 50 INJECTION INTRAMUSCULAR; INTRAVENOUS at 12:31

## 2021-08-27 RX ADMIN — CLONAZEPAM 6 MG: 1 TABLET ORAL at 23:51

## 2021-08-27 RX ADMIN — ALBUMIN (HUMAN) 250 ML: 12.5 INJECTION, SOLUTION INTRAVENOUS at 10:42

## 2021-08-27 RX ADMIN — KETAMINE HYDROCHLORIDE 10 MG: 50 INJECTION INTRAMUSCULAR; INTRAVENOUS at 11:46

## 2021-08-27 RX ADMIN — LIDOCAINE HYDROCHLORIDE 80 MG: 20 INJECTION, SOLUTION EPIDURAL; INFILTRATION; INTRACAUDAL; PERINEURAL at 08:41

## 2021-08-27 RX ADMIN — VECURONIUM BROMIDE 3 MG: 1 INJECTION, POWDER, LYOPHILIZED, FOR SOLUTION INTRAVENOUS at 13:12

## 2021-08-27 RX ADMIN — SUGAMMADEX 200 MG: 100 INJECTION, SOLUTION INTRAVENOUS at 17:00

## 2021-08-27 RX ADMIN — CALCIUM CHLORIDE 1 G: 100 INJECTION, SOLUTION INTRAVENOUS; INTRAVENTRICULAR at 16:07

## 2021-08-27 RX ADMIN — SODIUM CHLORIDE, SODIUM LACTATE, POTASSIUM CHLORIDE, AND CALCIUM CHLORIDE 25 ML/HR: 600; 310; 30; 20 INJECTION, SOLUTION INTRAVENOUS at 06:45

## 2021-08-27 RX ADMIN — ROCURONIUM BROMIDE 20 MG: 10 INJECTION, SOLUTION INTRAVENOUS at 09:43

## 2021-08-27 RX ADMIN — FAMOTIDINE 20 MG: 20 TABLET, FILM COATED ORAL at 23:53

## 2021-08-27 RX ADMIN — DEXTROSE MONOHYDRATE 5 MCG/MIN: 5 INJECTION, SOLUTION INTRAVENOUS at 12:23

## 2021-08-27 RX ADMIN — SODIUM CHLORIDE, SODIUM LACTATE, POTASSIUM CHLORIDE, AND CALCIUM CHLORIDE: 600; 310; 30; 20 INJECTION, SOLUTION INTRAVENOUS at 09:33

## 2021-08-27 RX ADMIN — AMITRIPTYLINE HYDROCHLORIDE 100 MG: 50 TABLET, FILM COATED ORAL at 23:54

## 2021-08-27 RX ADMIN — ROCURONIUM BROMIDE 50 MG: 10 INJECTION, SOLUTION INTRAVENOUS at 08:41

## 2021-08-27 RX ADMIN — SODIUM CHLORIDE: 9 INJECTION, SOLUTION INTRAVENOUS at 11:16

## 2021-08-27 RX ADMIN — PHENYLEPHRINE HYDROCHLORIDE 120 MCG: 10 INJECTION INTRAVENOUS at 11:27

## 2021-08-27 NOTE — PERIOP NOTES
Left radial arterial line noted upon arrival to PACU. Right radial arterial line charted in Lines/Drains tab of flowsheet.  CRNA reports that the right radial arterial line was compromised during surgery, and removed, and that they started a new one in the left wrist.

## 2021-08-27 NOTE — OP NOTES
Operative Report    Patient: Christian Mercado MRN: [id number]     YOB: 1945  Age: 66 y.o. 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 After induction of a general anesthetic, a Bishop catheter was inserted, the legs were placed in [address] [place] [address], the scrotum was sutured to the pubis region, and the colostomy appliance was covered with a plastic drape. The perineum, thighs,  and the abdomen was prepped and draped in standard fashion. A circumanal incision was made and cautery was used to dissect around the rectum into the subcutaneous tissue and to the levator musculature. This musculature was thickened consistent with prior radiation. Deep to the levators, there was complete obliteration of all perirectal tissue planes and the rectum was fixed to the sacrum and bladder; there may have previously been a rectovesical fistula. Also, the rectum was quite stenotic preventing digital insertion to help guide dissection. Careful circumferential dissection with cautery continued proximally along the rectum until the curvature of the sacrum prevented further advancement. This corresponded to the level of densest fixation to the sacrum, and the bladder.  Two fluid-filled cavities were encountered during mobilization, containing nonpurulent but turbid fluid; it was not readily apparent if these were intraluminal collections or extraluminal; the pre-existing, long standing percutaneous drain was not encountered. The mobilized distal rectum and anus was removed when traction on it resulted in dislodgement. Further mobilization resulted in additional piecemeal fragment extractions. A cystotomy was created during the mobilization, likely the site of prior fistula/trauma. Once the decision was made to abandon further perineal mobilization, the cavity was irrigated. The cystotomy was examined and healthy mucosa but a thickened, friable bladder wall was noted. The 15mm long opening was closed with 2-0 layers of running 3-0 chromic and interrupted 2-0 vicryl. Because this tissue was quite friable leading to a tenuous repair, this was not leak-tested. At no time was the body of the urinary catheter seen to suggest a urethral injury. With perineal dissection stalled, an abdominal approach was necessary. The lower 6-8cm of the abdomen was incised near the midline and cautery used to dissect through the scan subcutaneous tissue and scarred fascia, which may have included mesh, until small bowel was visualized. Sharp and blunt, sweeping finger dissection were used to free the diffuse small bowel adhesions from the anterior abdominal wall and the bladder, which was noted to be partially fluid-filled. The small bowel and bladder were then retracted cephalad and caudad to allow visualization into the pelvis. Two loops of small intestine were scarred to the midportion of the sacrum and these were sharply freed; a single enterotomy was closed with a typical 2-layer interrupted silk repair. A third section of intestine was identified posterior to the bladder and was mobilized from its posterior wall. This loop of intestine was also densely adherent to the sacrum.   Using mostly careful blunt dissection, it was mobilized anteriorly and laterally deep into the pelvis. It was then traced proximally where it was sharply freed from the lower left sidewall and neighboring small bowel. At this point it was recognized as large intestine, likely the descending colon advanced into the pelvis from prior low anterior resection. It was mobilized further proximally and was noted to enter the anterior abdominal wall, indicating a loop configuration of his LLQ colostomy. Further disssection distally in the pelvis continued posteriorly, with the dense scar to the sacrum freed bluntly and with careful cautery until the specimen was delivered, with a previously cut free end of large bowel. To complete the proctectomy, the colon was divided a few centimeters distal to its apparent loop configuration in the LLQ using a SOFÍA stapling device and the scant mesentery was divided at the bowel wall with cautery. The pelvis was irrigated and explored. A suction tip was able to pass from the abdominal opening into the perineal wound indicating complete proctectomy. The pre-existing percutaneous pig-tail catheter was not able to be palpated in the LLQ and was likely positioned behind the bladder, which was itself somewhat fused to the right anterolateral wall. Due to the chronic scarring, a specific attempt to expose the pigtail catheter was not done and this catheter was removed at the completion of the procedure. The pelvis was vigorously irrigated. Active bleeding was cauterized, although the entire area oozed due to prior scarring and XRT. Two sheets of surgicel were draped into the pelvis along the sacrum. The midline fascia was closed with a series of interrupted figure of eight 0-prolene sutures and the skin with a few, widely spaced staples. A 15F (no 19 available) Venancio drain was placed into the pelvis prior to closure and sutured to its separate stab site with nylon. Attention was returned to the perineal site.   It was inspected and manually examined. As was the case from intraperitoneally, scarring prevented additional definitive confirmation of communication between the abdominal and perineal dissections beyond that previously noted with the suction tip. There being no significant perineal hemorrhage, the case was turned to Dr Mary Lawrence for perineal wound flap closure. This procedure was far more difficult than a typical proctectomy due to the intense and diffuse scarring resulting from the patient's extremely complicated surgical history, prior radiation treatment, and history of numerous intraabdominal infections. This resulted in surgery requring 2x the typical work of a proctectomy. Cheikh Espinal was present as an assistant during the entirety of the case and assisted with exposure/retraction, disssection, hemostasis and closure. This procedure required a skilled assistant to aid in exposure in this unusually difficult scenario. Sponge and needle counts were correct times two as reported to me.     Estimated Blood Loss: per anesthesia approx 2000ml           Specimens:   ID Type Source Tests Collected by Time Destination   1 : colon and rectum Fresh Abdomen  Gen Ames MD 8/27/2021 1341 Pathology           Signed By:  Charlotte White MD     August 27, 2021

## 2021-08-27 NOTE — ANESTHESIA PREPROCEDURE EVALUATION
Relevant Problems   No relevant active problems       Anesthetic History   No history of anesthetic complications            Review of Systems / Medical History  Patient summary reviewed and pertinent labs reviewed    Pulmonary          Smoker (Former)         Neuro/Psych         Psychiatric history (Anxiety and depression)     Cardiovascular                  Exercise tolerance: >4 METS  Comments: Denies CP, SOB or changes in functional status   GI/Hepatic/Renal               Comments: Extensive GI history  LAR syndrome Endo/Other      Hypothyroidism: well controlled  Arthritis     Other Findings   Comments: Rectal ca  Hx/o DVT           Physical Exam    Airway  Mallampati: II  TM Distance: 4 - 6 cm  Neck ROM: normal range of motion   Mouth opening: Normal     Cardiovascular    Rhythm: regular  Rate: normal         Dental    Dentition: Caps/crowns     Pulmonary  Breath sounds clear to auscultation               Abdominal  GI exam deferred       Other Findings            Anesthetic Plan    ASA: 3  Anesthesia type: general          Induction: Intravenous  Anesthetic plan and risks discussed with: Patient    Friend also at bedside  I discussed possibility of PNB vs epidural as needed post op. Plan for Lidocaine and Ketamine infusion.

## 2021-08-27 NOTE — CONSULTS
Urology Consult    Patient: Christian Mercado MRN: [id number]  SSN: [id number]    YOB: 1945  Age: 66 y.o. Sex: male      Subjective:      Christian Mercado is a 66 y.o. male who presents today for completion proctectomy with Dr. Raina Kelley. We are consulted for hx of post operative urinary retention. He has history of post operative urinary retention and he would like to avoid a discharge that involves a brito catheter. Patient reports having bladder instilled with sterile water prior to brito removal and that this has been effective in the past helping him to void well after brito removal.  He is currently in surgery. He is followed by Dr. Crecencio Boas with hx of kidney stones, right hydronephrosis, urinary retention. Had cysto on 6/16/21 for urinary retention which showed a wide open bladder outlet and findings consistent with neurogenic bladder. He was started on CIC and then stopped this d/t resolving of symptoms.           Past Medical History:   Diagnosis Date    Acute deep vein thrombosis (DVT) of non-extremity vein 5/20/2019    Family history of malignant neoplasm of gastrointestinal tract     mother colon/ovarian cancer 67    Fecal incontinence     LAR syndrome    Former cigarette smoker     History of rectal cancer     Hypothyroid     stable w/med    Infection and inflammatory reaction due to other internal prosthetic devices, implants and grafts, subsequent encounter 2017    abd wound/mesh colostomy    Insomnia     takes meds    Major depression     Osteoarthritis, hand     Personal history of colonic polyps 9/2013    x 1    Personal history of malignant neoplasm of rectum, rectosigmoid junction, and anus 9/2013    Psychiatric disorder     anxiety     Past Surgical History:   Procedure Laterality Date    COLONOSCOPY N/A 2/12/2018    COLONOSCOPY / BMI=21 performed by Abebe Woodall MD at [facility] ENDOSCOPY    COLONOSCOPY N/A 3/5/2021    COLONOSCOPY/BMI 19 performed by Raina Kelley, Bebeto Vazquez MD at [place] [address]  2018         COLONOSCOPY THRU STOMA,LESN RMVL W/SNARE  3/5/2021         ENDOSCOPY, COLON, DIAGNOSTIC  last 2/3/15    Emily--no polyps--3 year recall    HX COLOSTOMY  16    HX GI  2016    Sacral nerve stimlator lead trial (unsucessful) and removal    HX HERNIA REPAIR  3/2015, 2016    HX HERNIA REPAIR      and Colostomy in May    HX OTHER SURGICAL  10/7/2013    Emily---endorectal us    HX OTHER SURGICAL Bilateral     cataracts      HX POLYPECTOMY      HX TOTAL COLECTOMY  2013    Emily--lap LAR with coloproctostomy, mobilization splenic flexure, diverting loop ileostomy    HX TOTAL COLECTOMY Right 2014    for leak    HX VASCULAR ACCESS Left     single lumen port/subsequently removed    IR CHANGE ABSCESS / CYST CATHETER  2021    IR CHANGE ABSCESS / CYST CATHETER  2021    IR CHANGE ABSCESS / CYST CATHETER  2021    IR CHANGE ABSCESS / CYST CATHETER  2021    IR INJ ABS CYST VIA CATHETER / TUBE  2021    IR INJ ABS CYST VIA CATHETER / TUBE  2021    IR INSERT TUNL CVC W/O PORT OVER 5 YR  2019    IR REPLACE CVC TUNNELED W/O PORT  2019      Family History   Problem Relation Age of Onset    Cancer Mother         colon and ovarian    Cancer Brother         prostate    Alcohol abuse Brother     Cancer Maternal Grandmother         bone    Alcohol abuse Father     Alcohol abuse Sister     Stroke Paternal Grandfather      Social History     Tobacco Use    Smoking status: Former Smoker     Packs/day: 1.00     Years: 4.00     Pack years: 4.00     Types: Cigarettes     Quit date: 1963     Years since quittin.8    Smokeless tobacco: Never Used   Substance Use Topics    Alcohol use: Not Currently     Alcohol/week: 11.7 standard drinks     Types: 14 Cans of beer per week     Comment: advised stop drinking given issues      Prior to Admission medications    Medication Sig Start Date End Date Taking? Authorizing Provider   levothyroxine (SYNTHROID) 125 mcg tablet TAKE ONE TABLET BY MOUTH ONE TIME DAILY BEFORE BREAKFAST 12/10/20  Yes Roger uMnoz DO   clonazePAM (KLONOPIN) 2 mg tablet Take 2 Tabs by mouth nightly. Max Daily Amount: 4 mg. Indications: takes for sleep  Patient taking differently: Take 6 mg by mouth nightly. Indications: takes for sleep 6/17/19  Yes Monica Schuler MD   amitriptyline (ELAVIL) 100 mg tablet Take 1 Tab by mouth nightly. Dr Loren Lorenzo psych  Indications: takes for sleep 6/17/19  Yes Monica Schuler MD   OLANZapine THE PAVILIION) 2.5 mg tablet Take 7.5 mg by mouth nightly. Indications: Takes 3 x 2.5 mg tabs   Yes Provider, Historical   Cholecalciferol, Vitamin D3, (VITAMIN D3) 2,000 unit cap capsule Take 2,000 Units by mouth two (2) times a day. 10/15/18  Yes Roger Álvarez DO        No Known Allergies    Review of Systems:  A comprehensive review of systems was negative except for that written in the History of Present Illness. Objective:     Vitals:    08/27/21 0604   BP: (!) 155/74   Pulse: 72   Resp: 18   Temp: 97.6 °F (36.4 °C)   SpO2: 97%   Weight: 132 lb 9.6 oz (60.1 kg)   Height: 5' 8\" (1.727 m)        Physical Exam: unable to complete    Assessment:     Hospital Problems  Date Reviewed: 7/22/2021          Codes Class Noted POA    H/O urinary retention ICD-10-CM: Z87.898  ICD-9-CM: V13.09  8/27/2021 Unknown              66 y.o. male who presents today for completion proctectomy with Dr. Michael Lazo. We are consulted for hx of post operative urinary retention. He has history of post operative urinary retention and he would like to avoid a discharge that involves a brito catheter. Patient reports having bladder instilled with sterile water prior to brito removal and that this has been effective in the past helping him to void well after brito removal.  He is currently in surgery. He is followed by Dr. Gonsalo Larson with hx of kidney stones, right hydronephrosis, urinary retention. Had cysto on 6/16/21 for urinary retention which showed a wide open bladder outlet and findings consistent with neurogenic bladder. He was started on CIC and then stopped this d/t resolving of symptoms. Plan:     Pt not seen at this time, as he is still in the OR. Will try to see pt post operatively. Please see below. Recommendations:    Once primary team is ready to remove brito catheter, have RN insert sterile water (up to 300 ml) in the bladder using a 60 ml syringe into the brito catheter. Once 300 ml is inserted OR once pt is at max capacity using less than 300 ml, then deflate balloon and pull brito catheter out. This is \"prime and pull\" method to try and irrigate bladder out to increase success in voiding. Please call with questions/concerns, we will sign off. Thank you for the opportunity to assist in the care of this patient. Signed By: Fan Ring NP     August 27, 2021        I have reviewed the above note. I agree with the HPI, exam, assessment and plan as outlined by the nurse practitioner. Jack Stevenson M.D.     [facility]  [address]  Phone: [phone]  Fax: [phone]

## 2021-08-27 NOTE — PERIOP NOTES
Debrief completed Y    Correct procedure Y    Count completed and verified Y    Specimen collected and verified Y    Wound classification CLEAN CONTAMINATED     Other N/A

## 2021-08-27 NOTE — H&P
CC: rectal drainage and abscess    HPI: 67 yo with hx of multiple abdominal procedures, LAR with persistent mucoid discharge from the residual anal stump that has become significantly debilitating.     Past Medical History:   Diagnosis Date    Acute deep vein thrombosis (DVT) of non-extremity vein 5/20/2019    Family history of malignant neoplasm of gastrointestinal tract     mother colon/ovarian cancer 67    Fecal incontinence     LAR syndrome    Former cigarette smoker     History of rectal cancer     Hypothyroid     stable w/med    Infection and inflammatory reaction due to other internal prosthetic devices, implants and grafts, subsequent encounter 2017    abd wound/mesh colostomy    Insomnia     takes meds    Major depression     Osteoarthritis, hand     Personal history of colonic polyps 9/2013    x 1    Personal history of malignant neoplasm of rectum, rectosigmoid junction, and anus 9/2013    Psychiatric disorder     anxiety     Past Surgical History:   Procedure Laterality Date    COLONOSCOPY N/A 2/12/2018    COLONOSCOPY / BMI=21 performed by Elise Kowalski MD at [facility] N/A 3/5/2021    COLONOSCOPY/BMI 19 performed by Lc Auguste MD at [address] [place] [address]  2/12/2018         COLONOSCOPY THRU STOMA,LESMADY RMVL W/SNARE  3/5/2021         ENDOSCOPY, COLON, DIAGNOSTIC  last 2/3/15    Emily--no polyps--3 year recall    HX COLOSTOMY  5/11/16    HX GI  4/2016    Sacral nerve stimlator lead trial (unsucessful) and removal    HX HERNIA REPAIR  3/2015, 5/2016    HX HERNIA REPAIR      and Colostomy in May    HX OTHER SURGICAL  10/7/2013    Emily---endorectal us    HX OTHER SURGICAL Bilateral     cataracts  2017    HX POLYPECTOMY      HX TOTAL COLECTOMY  11/2013    Emily--lap LAR with coloproctostomy, mobilization splenic flexure, diverting loop ileostomy    HX TOTAL COLECTOMY Right 8/2014    for leak    HX VASCULAR ACCESS Left 4/14 single lumen port/subsequently removed    IR CHANGE ABSCESS / CYST CATHETER  5/27/2021    IR CHANGE ABSCESS / CYST CATHETER  6/14/2021    IR CHANGE ABSCESS / CYST CATHETER  7/8/2021    IR CHANGE ABSCESS / CYST CATHETER  8/5/2021    IR INJ ABS CYST VIA CATHETER / TUBE  5/20/2021    IR INJ ABS CYST VIA CATHETER / TUBE  6/4/2021    IR INSERT TUNL CVC W/O PORT OVER 5 YR  5/20/2019    IR REPLACE CVC TUNNELED W/O PORT  5/30/2019     A&O x3  RRR  Resp clear  Abd soft  Drain in place, cloudy drainage. A/P:  Will proceed with flap closure in coordination with Dr Mark Chua. Patient understands that his multiple procedures place him at significant risk for surgical complication. He would like to proceed.

## 2021-08-27 NOTE — ANESTHESIA PROCEDURE NOTES
Arterial Line Placement    Start time: 8/27/2021 8:52 AM  End time: 8/27/2021 8:56 AM  Performed by: Kadi Wood MD  Authorized by: Kadi Wood MD     Pre-Procedure  Indications:  Arterial pressure monitoring and blood sampling  Preanesthetic Checklist: patient identified, risks and benefits discussed, anesthesia consent, site marked, patient being monitored, timeout performed and patient being monitored    Timeout Time: 08:52 EDT        Procedure:   Prep:  Chlorhexidine  Seldinger Technique?: Yes    Orientation:  Right  Location:  Radial artery  Catheter size:  20 G  Number of attempts:  1  Cont Cardiac Output Sensor: No      Assessment:   Post-procedure:  Line secured and sterile dressing applied  Patient Tolerance:  Patient tolerated the procedure well with no immediate complications

## 2021-08-27 NOTE — PERIOP NOTES
[id number]: Left radial arterial line removed. Catheter tip intact. Pressure held for 6 minutes. No hematoma, bruising, or oozing. Gauze/tape pressure dressing to left wrist. Patient tolerated well. VSS. CSM to left hand intact. 

## 2021-08-27 NOTE — H&P
Surgery Consult      Patient: Maryam Marroquin 66 y.o. male       Chief Complaint: rectal drainage    Subjective:      Brandy Hussein is a 66 y.o. male who has a very complex surgical history presenting for completion proctectomy of his defunctionalized rectum due to persistent and uncontrolled anal discharge.     Past Medical History:   Diagnosis Date    Acute deep vein thrombosis (DVT) of non-extremity vein 5/20/2019    Family history of malignant neoplasm of gastrointestinal tract     mother colon/ovarian cancer 67    Fecal incontinence     LAR syndrome    Former cigarette smoker     History of rectal cancer     Hypothyroid     stable w/med    Infection and inflammatory reaction due to other internal prosthetic devices, implants and grafts, subsequent encounter 2017    abd wound/mesh colostomy    Insomnia     takes meds    Major depression     Osteoarthritis, hand     Personal history of colonic polyps 9/2013    x 1    Personal history of malignant neoplasm of rectum, rectosigmoid junction, and anus 9/2013    Psychiatric disorder     anxiety       Past Surgical History:   Procedure Laterality Date    COLONOSCOPY N/A 2/12/2018    COLONOSCOPY / BMI=21 performed by Elise Kowalski MD at [address] [place] [address] N/A 3/5/2021    COLONOSCOPY/BMI 19 performed by Lc Auguste MD at [address] [place] [address]  2/12/2018         COLONOSCOPY THRU STOMA,LESMADY RMVL W/SNARE  3/5/2021         ENDOSCOPY, COLON, DIAGNOSTIC  last 2/3/15    Emily--no polyps--3 year recall    HX COLOSTOMY  5/11/16    HX GI  4/2016    Sacral nerve stimlator lead trial (unsucessful) and removal    HX HERNIA REPAIR  3/2015, 5/2016    HX HERNIA REPAIR      and Colostomy in May    HX OTHER SURGICAL  10/7/2013    Emily---endorectal us    HX OTHER SURGICAL Bilateral     cataracts  2017    HX POLYPECTOMY      HX TOTAL COLECTOMY  11/2013    Emily--lap LAR with coloproctostomy, mobilization splenic flexure, diverting loop ileostomy    HX TOTAL COLECTOMY Right 2014    for leak    HX VASCULAR ACCESS Left     single lumen port/subsequently removed    IR CHANGE ABSCESS / CYST CATHETER  2021    IR CHANGE ABSCESS / CYST CATHETER  2021    IR CHANGE ABSCESS / CYST CATHETER  2021    IR CHANGE ABSCESS / CYST CATHETER  2021    IR INJ ABS CYST VIA CATHETER / TUBE  2021    IR INJ ABS CYST VIA CATHETER / TUBE  2021    IR INSERT TUNL CVC W/O PORT OVER 5 YR  2019    IR REPLACE CVC TUNNELED W/O PORT  2019        Family History   Problem Relation Age of Onset    Cancer Mother         colon and ovarian    Cancer Brother         prostate    Alcohol abuse Brother     Cancer Maternal Grandmother         bone    Alcohol abuse Father     Alcohol abuse Sister     Stroke Paternal Grandfather        Social History     Socioeconomic History    Marital status: SINGLE     Spouse name: Not on file    Number of children: Not on file    Years of education: Not on file    Highest education level: Not on file   Tobacco Use    Smoking status: Former Smoker     Packs/day: 1.00     Years: 4.00     Pack years: 4.00     Types: Cigarettes     Quit date: 1963     Years since quittin.8    Smokeless tobacco: Never Used   Substance and Sexual Activity    Alcohol use: Not Currently     Alcohol/week: 11.7 standard drinks     Types: 14 Cans of beer per week     Comment: advised stop drinking given issues    Drug use: No    Sexual activity: Never     Social Determinants of Health     Financial Resource Strain:     Difficulty of Paying Living Expenses:    Food Insecurity:     Worried About Running Out of Food in the Last Year:     Ran Out of Food in the Last Year:    Transportation Needs:     Lack of Transportation (Medical):      Lack of Transportation (Non-Medical):    Physical Activity:     Days of Exercise per Week:     Minutes of Exercise per Session:    Stress:  Feeling of Stress :    Social Connections:     Frequency of Communication with Friends and Family:     Frequency of Social Gatherings with Friends and Family:     Attends Scientologist Services:     Active Member of Clubs or Organizations:     Attends Club or Organization Meetings:     Marital Status:         Current Facility-Administered Medications   Medication Dose Route Frequency    lidocaine (XYLOCAINE) 10 mg/mL (1 %) injection 0.1 mL  0.1 mL SubCUTAneous PRN    lactated Ringers infusion  25 mL/hr IntraVENous CONTINUOUS    acetaminophen (TYLENOL) tablet 1,000 mg  1,000 mg Oral ONCE    fentaNYL citrate (PF) injection 100 mcg  100 mcg IntraVENous ONCE    midazolam (VERSED) injection 2 mg  2 mg IntraVENous ONCE PRN    midazolam (VERSED) injection 2 mg  2 mg IntraVENous ONCE    naloxone (NARCAN) injection 0.2 mg  0.2 mg IntraVENous Multiple    ondansetron (ZOFRAN) injection 4 mg  4 mg IntraVENous ONCE PRN    cefOXitin (MEFOXIN) 2 g in 0.9% sodium chloride (MBP/ADV) 50 mL MBP  2 g IntraVENous NOW        No Known Allergies    Review of Systems:  Pertinent items are noted in the History of Present Illness. Objective:        Patient Vitals for the past 8 hrs:   BP Temp Pulse Resp SpO2 Height Weight   21 0604 (!) 155/74 97.6 °F (36.4 °C) 72 18 97 % 5' 8\" (1.727 m) 132 lb 9.6 oz (60.1 kg)       Temp (24hrs), Av.6 °F (36.4 °C), Min:97.6 °F (36.4 °C), Max:97.6 °F (36.4 °C)      Physical Exam:  Visit Vitals  BP (!) 155/74 (BP 1 Location: Left upper arm, BP Patient Position: Sitting)   Pulse 72   Temp 97.6 °F (36.4 °C)   Resp 18   Ht 5' 8\" (1.727 m)   Wt 132 lb 9.6 oz (60.1 kg)   SpO2 97%   BMI 20.16 kg/m²     General Appearance: In no acute distress   Ears/Nose/Mouth/Throat:   Hearing grossly normal.         Neck: Supple. Chest:   Lungs clear to auscultation bilaterally. Cardiovascular:  Regular rate and rhythm, S1, S2 normal, no murmur. Abdomen:   Soft. Scars c/w his complex history. Drainage catheter   Extremities: No gross deformity    Neuro: alert and oriented x 3                  Labs: No results found for this or any previous visit (from the past 24 hour(s)). Data Review   Assessment:     Anal drainage, history of rectal cancer, pelvic abscess    Plan:     Proceed with completion proctectomy with gracilis flap perineal reconstruction (Dr Ivon Romo). Risks, ramifications previously reviewed.     Signed By: Rogerio Jones MD     August 27, 2021

## 2021-08-27 NOTE — PERIOP NOTES
Patient will come to Dr. Antunez's office to : prescription.   Patient was advised of location and hours: Yes.   Patient was advised to bring photo identification: yes.   Please call when ready.                                     TRANSFER - OUT REPORT:    Verbal report given to Rica Hill RN on [address] [place] [address] being transferred to room 223 for routine post - op. Report consisted of patients Situation, Background, Assessment and Recommendations (SBAR). Information from the following report(s) SBAR, OR Summary, Procedure Summary, Intake/Output, MAR and Recent Results was reviewed with the receiving nurse. Lines:   Peripheral IV 08/27/21 Posterior;Right Forearm (Active)   Site Assessment Clean, dry, & intact 08/27/21 1800   Phlebitis Assessment 0 08/27/21 1800   Infiltration Assessment 0 08/27/21 1800   Dressing Status Clean, dry, & intact; Occlusive 08/27/21 1800   Dressing Type Tape;Transparent 08/27/21 1800   Hub Color/Line Status Green; Infusing;Patent 08/27/21 1800   Alcohol Cap Used No 08/27/21 1800       Peripheral IV 08/27/21 Left Arm (Active)   Site Assessment Clean, dry, & intact 08/27/21 1800   Phlebitis Assessment 0 08/27/21 1800   Infiltration Assessment 0 08/27/21 1800   Dressing Status Clean, dry, & intact; Occlusive 08/27/21 1800   Dressing Type Tape;Transparent 08/27/21 1800   Hub Color/Line Status Brianna Giovanna; Infusing;Patent;Capped;Flushed 08/27/21 1800   Alcohol Cap Used No 08/27/21 1800        Opportunity for questions and clarification was provided. Patient transported with:   O2 @ 2 liters    VTE prophylaxis orders have been written for [address] [place] [address]. Patient and family given floor number and nurses name. Family updated re: pt status after security code verified.

## 2021-08-27 NOTE — ANESTHESIA POSTPROCEDURE EVALUATION
Procedure(s):  Completion proctectomy, perineal and abdominal approach Repair of cystotomy  RIGHT  GRACILIS FLAP. general    Anesthesia Post Evaluation        Patient location during evaluation: PACU  Patient participation: complete - patient participated  Level of consciousness: awake  Pain management: satisfactory to patient  Airway patency: patent  Anesthetic complications: no  Cardiovascular status: hemodynamically stable  Respiratory status: spontaneous ventilation  Hydration status: euvolemic  Post anesthesia nausea and vomiting:  none    Pt looks great. Awake, following commands, no pressors in PACU. Pain well controlled. No indication for ICU bed. Plan for floor. INITIAL Post-op Vital signs:   Vitals Value Taken Time   /71 08/27/21 1842   Temp 36.8 °C (98.3 °F) 08/27/21 1815   Pulse 66 08/27/21 1845   Resp 18 08/27/21 1815   SpO2 99 % 08/27/21 1845   Vitals shown include unvalidated device data.

## 2021-08-27 NOTE — ANESTHESIA PROCEDURE NOTES
Arterial Line Placement    Start time: 8/27/2021 5:08 PM  End time: 8/27/2021 5:10 PM  Performed by: Araseli Prado MD  Authorized by: Araseli Prado MD     Pre-Procedure  Indications:  Arterial pressure monitoring, blood sampling and other (comment) (right radial arterial catheter no longer working.)  Preanesthetic Checklist: patient identified, risks and benefits discussed, patient being monitored, timeout performed and patient being monitored      Procedure:   Prep:  Chlorhexidine  Seldinger Technique?: Yes    Orientation:  Left  Location:  Radial artery  Catheter size:  20 G  Number of attempts:  1  Cont Cardiac Output Sensor: No      Assessment:   Post-procedure:  Sterile dressing applied  Patient Tolerance:  Patient tolerated the procedure well with no immediate complications

## 2021-08-28 LAB
ANION GAP SERPL CALC-SCNC: 4 MMOL/L (ref 7–16)
BLD PROD TYP BPU: NORMAL
BPU ID: NORMAL
BUN SERPL-MCNC: 13 MG/DL (ref 8–23)
CALCIUM SERPL-MCNC: 7.6 MG/DL (ref 8.3–10.4)
CHLORIDE SERPL-SCNC: 111 MMOL/L (ref 98–107)
CO2 SERPL-SCNC: 27 MMOL/L (ref 21–32)
CREAT SERPL-MCNC: 0.89 MG/DL (ref 0.8–1.5)
ERYTHROCYTE [DISTWIDTH] IN BLOOD BY AUTOMATED COUNT: 15.1 % (ref 11.9–14.6)
GLUCOSE SERPL-MCNC: 98 MG/DL (ref 65–100)
HCT VFR BLD AUTO: 29.7 % (ref 41.1–50.3)
HGB BLD-MCNC: 9.4 G/DL (ref 13.6–17.2)
MCH RBC QN AUTO: 28.1 PG (ref 26.1–32.9)
MCHC RBC AUTO-ENTMCNC: 31.6 G/DL (ref 31.4–35)
MCV RBC AUTO: 88.9 FL (ref 79.6–97.8)
NRBC # BLD: 0 K/UL (ref 0–0.2)
PLATELET # BLD AUTO: 119 K/UL (ref 150–450)
PMV BLD AUTO: 9.4 FL (ref 9.4–12.3)
POTASSIUM SERPL-SCNC: 4 MMOL/L (ref 3.5–5.1)
RBC # BLD AUTO: 3.34 M/UL (ref 4.23–5.6)
SODIUM SERPL-SCNC: 142 MMOL/L (ref 138–145)
STATUS OF UNIT,%ST: NORMAL
UNIT DIVISION, %UDIV: 0
WBC # BLD AUTO: 6.8 K/UL (ref 4.3–11.1)

## 2021-08-28 PROCEDURE — 36415 COLL VENOUS BLD VENIPUNCTURE: CPT

## 2021-08-28 PROCEDURE — 3331090002 HH PPS REVENUE DEBIT

## 2021-08-28 PROCEDURE — 74011000250 HC RX REV CODE- 250: Performed by: PHYSICIAN ASSISTANT

## 2021-08-28 PROCEDURE — 65270000029 HC RM PRIVATE

## 2021-08-28 PROCEDURE — 74011250637 HC RX REV CODE- 250/637: Performed by: NURSE PRACTITIONER

## 2021-08-28 PROCEDURE — 74011000258 HC RX REV CODE- 258: Performed by: PHYSICIAN ASSISTANT

## 2021-08-28 PROCEDURE — 80048 BASIC METABOLIC PNL TOTAL CA: CPT

## 2021-08-28 PROCEDURE — 85027 COMPLETE CBC AUTOMATED: CPT

## 2021-08-28 PROCEDURE — 3331090001 HH PPS REVENUE CREDIT

## 2021-08-28 PROCEDURE — 74011250636 HC RX REV CODE- 250/636: Performed by: PHYSICIAN ASSISTANT

## 2021-08-28 PROCEDURE — 2709999900 HC NON-CHARGEABLE SUPPLY

## 2021-08-28 PROCEDURE — 74011250637 HC RX REV CODE- 250/637: Performed by: PHYSICIAN ASSISTANT

## 2021-08-28 RX ADMIN — FAMOTIDINE 20 MG: 20 TABLET, FILM COATED ORAL at 18:03

## 2021-08-28 RX ADMIN — DEXTROSE MONOHYDRATE AND SODIUM CHLORIDE 125 ML/HR: 5; .45 INJECTION, SOLUTION INTRAVENOUS at 04:18

## 2021-08-28 RX ADMIN — AMITRIPTYLINE HYDROCHLORIDE 100 MG: 50 TABLET, FILM COATED ORAL at 22:24

## 2021-08-28 RX ADMIN — LEVOTHYROXINE SODIUM 125 MCG: 0.12 TABLET ORAL at 05:35

## 2021-08-28 RX ADMIN — PIPERACILLIN SODIUM AND TAZOBACTAM SODIUM 3.38 G: 3; .375 INJECTION, POWDER, LYOPHILIZED, FOR SOLUTION INTRAVENOUS at 13:30

## 2021-08-28 RX ADMIN — PIPERACILLIN SODIUM AND TAZOBACTAM SODIUM 3.38 G: 3; .375 INJECTION, POWDER, LYOPHILIZED, FOR SOLUTION INTRAVENOUS at 05:38

## 2021-08-28 RX ADMIN — OXYCODONE 5 MG: 5 TABLET ORAL at 04:07

## 2021-08-28 RX ADMIN — DEXTROSE MONOHYDRATE AND SODIUM CHLORIDE 125 ML/HR: 5; .45 INJECTION, SOLUTION INTRAVENOUS at 18:03

## 2021-08-28 RX ADMIN — FAMOTIDINE 20 MG: 20 TABLET, FILM COATED ORAL at 08:06

## 2021-08-28 RX ADMIN — CLONAZEPAM 6 MG: 1 TABLET ORAL at 21:50

## 2021-08-28 RX ADMIN — OLANZAPINE 7.5 MG: 5 TABLET, FILM COATED ORAL at 21:51

## 2021-08-28 RX ADMIN — OXYCODONE 5 MG: 5 TABLET ORAL at 18:03

## 2021-08-28 RX ADMIN — Medication 10 ML: at 18:05

## 2021-08-28 RX ADMIN — PIPERACILLIN SODIUM AND TAZOBACTAM SODIUM 3.38 G: 3; .375 INJECTION, POWDER, LYOPHILIZED, FOR SOLUTION INTRAVENOUS at 21:55

## 2021-08-28 NOTE — PROGRESS NOTES
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 Tolerating Clear Liquids. No nausea or vomiting. AF, NAD. OBJECTIVE:  Constitutional: Alert oriented cooperative patient in no acute distress; appears stated age   Visit Vitals  /67   Pulse 80   Temp 97.8 °F (36.6 °C)   Resp 18   Ht 5' 8\" (1.727 m)   Wt 132 lb 9.6 oz (60.1 kg)   SpO2 97%   BMI 20.16 kg/m²     Eyes: Sclera are clear. ENMT: no external lesions gross hearing normal; no obvious neck masses, no ear or lip lesions  CV: RRR. Normal perfusion  Resp: No JVD. Breathing is  non-labored; no audible wheezing. GI: soft and non-distended, appropriately ttp, DIANE drain x 1    : Bishop  Musculoskeletal: No embolic signs or cyanosis.  DIANE drain Right thigh  Neuro: moves all 4; no focal deficits  Psychiatric: normal affect and mood      Patient Vitals for the past 24 hrs:   BP Temp Pulse Resp SpO2   08/28/21 0756 119/67 97.8 °F (36.6 °C) 80 18 97 %   08/28/21 0522 (!) 111/59 98.6 °F (37 °C) 77 18 96 %   08/27/21 2324 124/71 98.9 °F (37.2 °C) 70 18 100 %   08/27/21 2030 (!) 152/81 98.4 °F (36.9 °C) 73 17 99 %   08/27/21 2000   68  100 %   08/27/21 1958 (!) 146/72  64  100 %   08/27/21 1952 (!) 147/72  65  100 %   08/27/21 1930 (!) 151/70  65 20 100 %   08/27/21 1908 (!) 151/74  67 18 99 %   08/27/21 1900 133/67  66 20 100 %   08/27/21 1845 129/71  66 20 99 %   08/27/21 1830 (!) 110/58  64 17 100 %   08/27/21 1815 (!) 110/59 98.3 °F (36.8 °C) 64 18 99 %   08/27/21 1800 (!) 107/57  66 17 99 %   08/27/21 1755 (!) 107/55  66 18 99 %   08/27/21 1745 (!) 98/55  68 18 100 %   08/27/21 1740 (!) 112/57  71 20 100 %   08/27/21 1735 (!) 117/58  72 20 99 %   08/27/21 1730 133/66  70 17 99 %   08/27/21 1725 (!) 148/55 98 °F (36.7 °C) 68 17 99 %   08/27/21 1719     97 %     Labs:    Recent Labs     08/28/21  0621 08/27/21  1630 08/27/21  1630 08/27/21  1330 08/27/21  1156   WBC 6.8   < > 9.9   < > 4.7   HGB 9.4*   < > 9.1*   < > 9.3*   *   < > 128*   < > 151     --   --    < > 141   K 4.0  --   --    < > 3.9   *  --   --    < > 113*   CO2 27  --   --    < > 22   BUN 13  --   --    < > 23   CREA 0.89  --   --    < > 1.01   GLU 98  --   --    < > 127*   PTP  --   --  16.3*   < > 15.1*   INR  --   --  1.3   < > 1.2   APTT  --   --  28.1   < > 62.9*   TBILI  --   --   --   --  0.3   ALT  --   --   --   --  24   AP  --   --   --   --  47*    < > = values in this interval not displayed. 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 ENMT: no external lesions gross hearing normal; no obvious neck masses, no ear or lip lesions  CV: RRR. Normal perfusion  Resp: No JVD. Breathing is  non-labored; no audible wheezing. GI: soft and non-distended     Musculoskeletal: unremarkable with normal function. No embolic signs or cyanosis.    Neuro:  Oriented; moves all 4; no focal deficits  Psychiatric: normal affect and mood, no memory impairment      Patient Vitals for the past 24 hrs:   BP Temp Pulse Resp SpO2   08/28/21 0756 119/67 97.8 °F (36.6 °C) 80 18 97 %   08/28/21 0522 (!) 111/59 98.6 °F (37 °C) 77 18 96 %   08/27/21 2324 124/71 98.9 °F (37.2 °C) 70 18 100 %   08/27/21 2030 (!) 152/81 98.4 °F (36.9 °C) 73 17 99 %   08/27/21 2000   68  100 %   08/27/21 1958 (!) 146/72  64  100 %   08/27/21 1952 (!) 147/72  65  100 %   08/27/21 1930 (!) 151/70  65 20 100 %   08/27/21 1908 (!) 151/74  67 18 99 %   08/27/21 1900 133/67  66 20 100 %   08/27/21 1845 129/71  66 20 99 %   08/27/21 1830 (!) 110/58  64 17 100 %   08/27/21 1815 (!) 110/59 98.3 °F (36.8 °C) 64 18 99 %   08/27/21 1800 (!) 107/57  66 17 99 %   08/27/21 1755 (!) 107/55  66 18 99 %   08/27/21 1745 (!) 98/55  68 18 100 %   08/27/21 1740 (!) 112/57  71 20 100 %   08/27/21 1735 (!) 117/58  72 20 99 %   08/27/21 1730 133/66  70 17 99 %   08/27/21 1725 (!) 148/55 98 °F (36.7 °C) 68 17 99 %   08/27/21 1719     97 %     Labs:    Recent Labs     08/28/21  0621 08/27/21  1630 08/27/21  1630 08/27/21  1330 08/27/21  1156   WBC 6.8   < > 9.9   < > 4.7   HGB 9.4*   < > 9.1*   < > 9.3*   *   < > 128*   < > 151     --   --    < > 141   K 4.0  --   --    < > 3.9   *  --   --    < > 113*   CO2 27  --   --    < > 22   BUN 13  --   --    < > 23   CREA 0.89  --   --    < > 1.01   GLU 98  --   --    < > 127*   PTP  --   --  16.3*   < > 15.1*   INR  --   --  1.3   < > 1.2   APTT  --   --  28.1   < > 62.9*   TBILI  --   --   --   --  0.3   ALT  --   --   --   --  24   AP  --   --   --   --  47*    < > = values in this interval not displayed.        Shakir Winchester, MAZNI

## 2021-08-28 NOTE — PROGRESS NOTES
TRANSFER - IN REPORT:    Verbal report received from Cailin Robb RN on [address]  being received from PACU for routine post - op      Report consisted of patients Situation, Background, Assessment and   Recommendations(SBAR). Information from the following report(s) SBAR, Kardex, OR Summary, Intake/Output and MAR was reviewed with the receiving nurse. Opportunity for questions and clarification was provided. Assessment completed upon patients arrival to unit and care assumed.

## 2021-08-28 NOTE — PROGRESS NOTES
Problem: Falls - Risk of  Goal: *Absence of Falls  Description: Document Omayra Zimmerman Fall Risk and appropriate interventions in the flowsheet.   Outcome: Progressing Towards Goal  Note: Fall Risk Interventions:  Mobility Interventions: Communicate number of staff needed for ambulation/transfer, Patient to call before getting OOB         Medication Interventions: Patient to call before getting OOB, Teach patient to arise slowly    Elimination Interventions: Call light in reach, Patient to call for help with toileting needs

## 2021-08-28 NOTE — PROGRESS NOTES
INITIAL SPIRITUAL ASSESSMENT     NOTED:      No nereida preference    Friend - patience    Full code    No directives on file    Anxiety    Major depression        WILL ASSESS HOW WE CAN BEST SERVE THIS FAMILY

## 2021-08-28 NOTE — PROGRESS NOTES
08/27/21 2005   Dual Skin Pressure Injury Assessment   Dual Skin Pressure Injury Assessment WDL  (pt has abnormalities but not r/t pressure injuries.)   Second Care Provider (Based on [address] [place] [address]) Wellington Gleason RN   Skin Integumentary   Skin Integumentary (WDL) X    Pressure  Injury Documentation No Pressure Injury Noted-Pressure Ulcer Prevention Initiated   Skin Integrity Incision (comment)  (abd incision, leg incision, ostomy)   Skin Color Appropriate for ethnicity   Skin Condition/Temp Warm;Dry   Wound Prevention and Protection Methods   Orientation of Wound Prevention Posterior   Location of Wound Prevention Sacrum/Coccyx   Dressing Present  No  (dressing present but not r/t pressure injury prevention)   Wound Offloading (Prevention Methods) Bed, pressure reduction mattress

## 2021-08-28 NOTE — PROGRESS NOTES
END OF SHIFT NOTE:    INTAKE/OUTPUT  08/27 0701 - 08/28 0700  In: 8615.6 [I.V.:7626.6]  Out: 8740 [Urine:3800; Drains:205]  Voiding: NO. Bishop draining. Catheter: YES  Drain:   Xavier-Schaefer Drain 08/27/21 Anterior;Right Leg (Active)   Site Assessment Clean, dry, & intact 08/27/21 2005   Dressing Status Clean, dry, & intact 08/27/21 2005   Status Patent; Charged 08/27/21 2005   Drainage Color Sanguinous 08/28/21 0414   Output (ml) 50 ml 08/28/21 0414       Venancio Drain #1 08/27/21 Right Abdomen (Active)   Site Assessment Clean, dry, & intact 08/27/21 2005   Dressing Status Clean, dry, & intact 08/27/21 2005   Drainage Description Sanguinous 08/28/21 0414   Venancio Drain Airleak No 08/27/21 2005   Status Patent; Charged 08/27/21 2005   Y Connector Used No 08/27/21 2005   Output (ml) 40 ml 08/28/21 0414               Flatus: Patient does have flatus present. Stool:  0 occurrences. Ostomy present. Stool present in ostomy. Characteristics:  Stool Assessment  Stool Color: Brown, Red (brown with some red present)  Stool Appearance: Loose, Bloody (some blood present)  Stool Amount: Small  Stool Source/Status: Colostomy    Emesis: 0 occurrences.     Characteristics:        VITAL SIGNS  Patient Vitals for the past 12 hrs:   Temp Pulse Resp BP SpO2   08/28/21 0522 98.6 °F (37 °C) 77 18 (!) 111/59 96 %   08/27/21 2324 98.9 °F (37.2 °C) 70 18 124/71 100 %   08/27/21 2030 98.4 °F (36.9 °C) 73 17 (!) 152/81 99 %   08/27/21 2000  68   100 %   08/27/21 1958  64  (!) 146/72 100 %   08/27/21 1952  65  (!) 147/72 100 %   08/27/21 1930  65 20 (!) 151/70 100 %   08/27/21 1908  67 18 (!) 151/74 99 %   08/27/21 1900  66 20 133/67 100 %   08/27/21 1845  66 20 129/71 99 %   08/27/21 1830  64 17 (!) 110/58 100 %   08/27/21 1815 98.3 °F (36.8 °C) 64 18 (!) 110/59 99 %   08/27/21 1800  66 17 (!) 107/57 99 %   08/27/21 1755  66 18 (!) 107/55 99 %       Pain Assessment  Pain Intensity 1: 0 (08/27/21 2005)        Patient Stated Pain Goal: 0    Ambulating  No    Shift report to be given to oncoming nurse at the bedside.     Yunier Farrell RN

## 2021-08-29 PROCEDURE — 3331090002 HH PPS REVENUE DEBIT

## 2021-08-29 PROCEDURE — 74011000258 HC RX REV CODE- 258: Performed by: PHYSICIAN ASSISTANT

## 2021-08-29 PROCEDURE — 74011250637 HC RX REV CODE- 250/637: Performed by: PHYSICIAN ASSISTANT

## 2021-08-29 PROCEDURE — 74011000250 HC RX REV CODE- 250: Performed by: PHYSICIAN ASSISTANT

## 2021-08-29 PROCEDURE — 74011250636 HC RX REV CODE- 250/636: Performed by: PHYSICIAN ASSISTANT

## 2021-08-29 PROCEDURE — 65270000029 HC RM PRIVATE

## 2021-08-29 PROCEDURE — 74011250637 HC RX REV CODE- 250/637: Performed by: NURSE PRACTITIONER

## 2021-08-29 PROCEDURE — 3331090001 HH PPS REVENUE CREDIT

## 2021-08-29 PROCEDURE — 2709999900 HC NON-CHARGEABLE SUPPLY

## 2021-08-29 RX ADMIN — OLANZAPINE 7.5 MG: 5 TABLET, FILM COATED ORAL at 22:30

## 2021-08-29 RX ADMIN — LEVOTHYROXINE SODIUM 125 MCG: 0.12 TABLET ORAL at 05:30

## 2021-08-29 RX ADMIN — Medication 10 ML: at 14:01

## 2021-08-29 RX ADMIN — FAMOTIDINE 20 MG: 20 TABLET, FILM COATED ORAL at 08:47

## 2021-08-29 RX ADMIN — OXYCODONE 5 MG: 5 TABLET ORAL at 14:00

## 2021-08-29 RX ADMIN — PIPERACILLIN SODIUM AND TAZOBACTAM SODIUM 3.38 G: 3; .375 INJECTION, POWDER, LYOPHILIZED, FOR SOLUTION INTRAVENOUS at 14:01

## 2021-08-29 RX ADMIN — DEXTROSE MONOHYDRATE AND SODIUM CHLORIDE 125 ML/HR: 5; .45 INJECTION, SOLUTION INTRAVENOUS at 23:04

## 2021-08-29 RX ADMIN — DEXTROSE MONOHYDRATE AND SODIUM CHLORIDE 125 ML/HR: 5; .45 INJECTION, SOLUTION INTRAVENOUS at 12:59

## 2021-08-29 RX ADMIN — PIPERACILLIN SODIUM AND TAZOBACTAM SODIUM 3.38 G: 3; .375 INJECTION, POWDER, LYOPHILIZED, FOR SOLUTION INTRAVENOUS at 22:30

## 2021-08-29 RX ADMIN — FAMOTIDINE 20 MG: 20 TABLET, FILM COATED ORAL at 17:40

## 2021-08-29 RX ADMIN — CLONAZEPAM 6 MG: 1 TABLET ORAL at 22:30

## 2021-08-29 RX ADMIN — PIPERACILLIN SODIUM AND TAZOBACTAM SODIUM 3.38 G: 3; .375 INJECTION, POWDER, LYOPHILIZED, FOR SOLUTION INTRAVENOUS at 05:30

## 2021-08-29 RX ADMIN — AMITRIPTYLINE HYDROCHLORIDE 100 MG: 50 TABLET, FILM COATED ORAL at 22:30

## 2021-08-29 NOTE — PROGRESS NOTES
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 Tolerating Clear Liquids. No nausea or vomiting. Bishop patent. AF, NAD. OBJECTIVE:  Constitutional: Alert oriented cooperative patient in no acute distress; appears stated age   Visit Vitals  /69   Pulse 81   Temp 99.3 °F (37.4 °C)   Resp 17   Ht 5' 8\" (1.727 m)   Wt 132 lb 9.6 oz (60.1 kg)   SpO2 95%   BMI 20.16 kg/m²     Eyes: Sclera are clear. ENMT: no external lesions gross hearing normal; no obvious neck masses, no ear or lip lesions  CV: RRR. Normal perfusion  Resp: No JVD. Breathing is  non-labored; no audible wheezing. GI: soft and non-distended, appropriately ttp, DIANE drain x 1 - Serosanguinous 160mL 24hr output. Stoma viable   : Bishop 2900mL 24hr outpput  Musculoskeletal: No embolic signs or cyanosis.  DIANE drain Right thigh - Serosanguinous 95ml 24hr output  Neuro: moves all 4; no focal deficits  Psychiatric: normal affect and mood      Patient Vitals for the past 24 hrs:   BP Temp Pulse Resp SpO2   08/29/21 0702 120/69 99.3 °F (37.4 °C) 81 17 95 %   08/29/21 0355 113/62 98.5 °F (36.9 °C) 73 18 95 %   08/28/21 2322 114/60 98.5 °F (36.9 °C) (!) 59 17 95 %   08/28/21 1925 119/71 98.9 °F (37.2 °C) 74 18 96 %   08/28/21 1605 125/76 98.1 °F (36.7 °C) 80 17 96 %   08/28/21 1216 138/81 98 °F (36.7 °C) 77 18 98 %     Labs:    Recent Labs     08/28/21  0621 08/27/21  1630 08/27/21  1630 08/27/21  1330 08/27/21  1156   WBC 6.8   < > 9.9   < > 4.7   HGB 9.4*   < > 9.1*   < > 9.3*   *   < > 128*   < > 151     --   --    < > 141   K 4.0  --   --    < > 3.9   CL 111*  --   --    < > 113*   CO2 27  --   --    < > 22   BUN 13  --   --    < > 23   CREA 0.89  --   --    < > 1.01   GLU 98  --   --    < > 127*   PTP  --   --  16.3*   < > 15.1*   INR  --   --  1.3   < > 1.2   APTT  --   --  28.1   < > 62.9*   TBILI  --   --   --   --  0.3   ALT  --   --   --   --  24   AP  --   --   --   --  47*    < > = values in this interval not displayed.        Lucia Wick, NP

## 2021-08-29 NOTE — PROGRESS NOTES
END OF SHIFT NOTE:    INTAKE/OUTPUT  08/28 0701 - 08/29 0700  In: 2512.8 [P.O.:780; I.V.:1732.8]  Out: 6714 [Urine:2900; Drains:255]  Voiding: NO. Bishop draining. Catheter: YES  Drain:   Xavier-Schaefer Drain 08/27/21 Anterior;Right Leg (Active)   Site Assessment Clean, dry, & intact 08/28/21 1932   Dressing Status Clean, dry, & intact 08/28/21 1932   Status Patent; Charged 08/28/21 1932   Drainage Color Serosanguinous 08/29/21 0120   Output (ml) 10 ml 08/29/21 0120       Willadean Peek #1 08/27/21 Right Abdomen (Active)   Site Assessment Clean, dry, & intact 08/28/21 1932   Dressing Status Clean, dry, & intact 08/28/21 1932   Drainage Description Serosanguinous 08/29/21 0120   Venancio Drain Airleak No 08/28/21 1932   Status Patent; Charged 08/28/21 1932   Y Connector Used No 08/28/21 1932   Output (ml) 20 ml 08/29/21 0120               Flatus: Patient does have flatus present. Stool:  0 occurrences. Ostomy, draining. Characteristics:  Stool Assessment  Stool Color: Brown, Red (brown with some red present)  Stool Appearance: Loose  Stool Amount: Small  Stool Source/Status: Colostomy    Emesis: 0 occurrences. Characteristics:        VITAL SIGNS  Patient Vitals for the past 12 hrs:   Temp Pulse Resp BP SpO2   08/29/21 0355 98.5 °F (36.9 °C) 73 18 113/62 95 %   08/28/21 2322 98.5 °F (36.9 °C) (!) 59 17 114/60 95 %   08/28/21 1925 98.9 °F (37.2 °C) 74 18 119/71 96 %       Pain Assessment  Pain Intensity 1: 0 (pt states his pain is triggered by movement. not present now) (08/28/21 2038)  Pain Location 1: Abdomen  Pain Intervention(s) 1: Medication (see MAR), Emotional support  Patient Stated Pain Goal: 0    Ambulating  No    Shift report to be given to oncoming nurse at the bedside.     Yunier Farrell RN

## 2021-08-29 NOTE — PROGRESS NOTES
END OF SHIFT NOTE:    INTAKE/OUTPUT  08/28 0701 - 08/29 0700  In: 2512.8 [P.O.:780; I.V.:1732.8]  Out: 8566 [Urine:2900; Drains:255]  Voiding: NO  Catheter: YES  Drain:   Xavier-Schaefer Drain 08/27/21 Anterior;Right Leg (Active)   Site Assessment Drainage (comment) 08/29/21 1358   Dressing Status Intact 08/29/21 1358   Status Patent; Charged;Draining 08/29/21 1358   Drainage Color Serosanguinous 08/29/21 1741   Output (ml) 15 ml 08/29/21 1741       Venancio Drain #1 08/27/21 Right Abdomen (Active)   Site Assessment Clean, dry, & intact 08/29/21 1358   Dressing Status Clean, dry, & intact 08/29/21 1358   Drainage Description Sanguinous 08/29/21 1358   Venancio Drain Airleak No 08/29/21 1358   Status Patent; Charged;Draining 08/29/21 1358   Y Connector Used No 08/29/21 1358   Output (ml) 30 ml 08/29/21 1358               Flatus: Patient does have flatus present. Stool:  1 occurrences. Characteristics:  20 cc liquid brown via ostomy    Emesis: 0 occurrences. Characteristics:        VITAL SIGNS  Patient Vitals for the past 12 hrs:   Temp Pulse Resp BP SpO2   08/29/21 1800 98.2 °F (36.8 °C) 80 17 131/72 93 %   08/29/21 1630 99.7 °F (37.6 °C) 83 17 135/75 95 %   08/29/21 1144 98.5 °F (36.9 °C) 75 18 118/70 97 %       Pain Assessment  Pain Intensity 1: 0 (08/29/21 1559)  Pain Location 1: Abdomen  Pain Intervention(s) 1: Medication (see MAR)  Patient Stated Pain Goal: 0    Ambulating  Yes from bed to chair with assistance    Shift report given to oncoming nurse at the bedside.     Miles Bob RN

## 2021-08-30 PROCEDURE — 97161 PT EVAL LOW COMPLEX 20 MIN: CPT

## 2021-08-30 PROCEDURE — 2709999900 HC NON-CHARGEABLE SUPPLY

## 2021-08-30 PROCEDURE — 99024 POSTOP FOLLOW-UP VISIT: CPT | Performed by: PHYSICIAN ASSISTANT

## 2021-08-30 PROCEDURE — 3331090002 HH PPS REVENUE DEBIT

## 2021-08-30 PROCEDURE — 74011250637 HC RX REV CODE- 250/637: Performed by: PHYSICIAN ASSISTANT

## 2021-08-30 PROCEDURE — APPNB30 APP NON BILLABLE TIME 0-30 MINS: Performed by: PHYSICIAN ASSISTANT

## 2021-08-30 PROCEDURE — 3331090001 HH PPS REVENUE CREDIT

## 2021-08-30 PROCEDURE — 74011250637 HC RX REV CODE- 250/637: Performed by: NURSE PRACTITIONER

## 2021-08-30 PROCEDURE — 74011000258 HC RX REV CODE- 258: Performed by: PHYSICIAN ASSISTANT

## 2021-08-30 PROCEDURE — 74011250636 HC RX REV CODE- 250/636: Performed by: PHYSICIAN ASSISTANT

## 2021-08-30 PROCEDURE — 65270000029 HC RM PRIVATE

## 2021-08-30 PROCEDURE — 74011000250 HC RX REV CODE- 250: Performed by: PHYSICIAN ASSISTANT

## 2021-08-30 PROCEDURE — 97530 THERAPEUTIC ACTIVITIES: CPT

## 2021-08-30 PROCEDURE — APPSS30 APP SPLIT SHARED TIME 16-30 MINUTES: Performed by: PHYSICIAN ASSISTANT

## 2021-08-30 RX ORDER — HEPARIN SODIUM 5000 [USP'U]/ML
5000 INJECTION, SOLUTION INTRAVENOUS; SUBCUTANEOUS EVERY 8 HOURS
Status: DISCONTINUED | OUTPATIENT
Start: 2021-08-30 | End: 2021-09-03 | Stop reason: HOSPADM

## 2021-08-30 RX ADMIN — PIPERACILLIN SODIUM AND TAZOBACTAM SODIUM 3.38 G: 3; .375 INJECTION, POWDER, LYOPHILIZED, FOR SOLUTION INTRAVENOUS at 05:39

## 2021-08-30 RX ADMIN — FAMOTIDINE 20 MG: 20 TABLET, FILM COATED ORAL at 08:18

## 2021-08-30 RX ADMIN — Medication 10 ML: at 14:32

## 2021-08-30 RX ADMIN — Medication 10 ML: at 05:42

## 2021-08-30 RX ADMIN — OXYCODONE 5 MG: 5 TABLET ORAL at 06:14

## 2021-08-30 RX ADMIN — Medication 10 ML: at 22:13

## 2021-08-30 RX ADMIN — AMITRIPTYLINE HYDROCHLORIDE 100 MG: 50 TABLET, FILM COATED ORAL at 21:58

## 2021-08-30 RX ADMIN — OLANZAPINE 7.5 MG: 5 TABLET, FILM COATED ORAL at 21:57

## 2021-08-30 RX ADMIN — PIPERACILLIN SODIUM AND TAZOBACTAM SODIUM 3.38 G: 3; .375 INJECTION, POWDER, LYOPHILIZED, FOR SOLUTION INTRAVENOUS at 14:11

## 2021-08-30 RX ADMIN — HEPARIN SODIUM 5000 UNITS: 5000 INJECTION INTRAVENOUS; SUBCUTANEOUS at 14:12

## 2021-08-30 RX ADMIN — FAMOTIDINE 20 MG: 20 TABLET, FILM COATED ORAL at 17:23

## 2021-08-30 RX ADMIN — CLONAZEPAM 6 MG: 1 TABLET ORAL at 21:55

## 2021-08-30 RX ADMIN — HEPARIN SODIUM 5000 UNITS: 5000 INJECTION INTRAVENOUS; SUBCUTANEOUS at 22:01

## 2021-08-30 RX ADMIN — PIPERACILLIN SODIUM AND TAZOBACTAM SODIUM 3.38 G: 3; .375 INJECTION, POWDER, LYOPHILIZED, FOR SOLUTION INTRAVENOUS at 21:59

## 2021-08-30 RX ADMIN — LEVOTHYROXINE SODIUM 125 MCG: 0.12 TABLET ORAL at 05:39

## 2021-08-30 RX ADMIN — DEXTROSE MONOHYDRATE AND SODIUM CHLORIDE 125 ML/HR: 5; .45 INJECTION, SOLUTION INTRAVENOUS at 05:42

## 2021-08-30 NOTE — OP NOTES
Operative Note    Patient: Lyda Schirmer Comin  YOB: 1945  MRN: [id number]    Date of Procedure: 8/27/2021     Pre-Op Diagnosis:     Perineal wound defect    Post-Op Diagnosis: Same as preoperative diagnosis. Procedure(s):  Right thigh gracilis musculocutaneous flap closure of perineal defect     Surgeon:  Bernabe Lopez MD    Anesthesia: General     Estimated Blood Loss (mL): less than 100     Prior to the procedure the patient was met in holding. Once again a discussion of the risks, benefits and alternatives was conducted including but not limited to bleeding, infection, failure of the surgery, need for further procedures, disappointing or unacceptable cosmesis, damage to adjacent structures was conducted. The patient again affirmed understanding and consent. Lines of incision, gracilis marked with the patient in the upright position. Positioning and room setup was performed in coordination with myself and Dr Brittny Bourgeois. Following completion of the resective portion of the procedure the surgical field was covered and a new sterile field established. The patient was re-prepped and draped. During this period stirrups were relaxed and lower leg perfusion assessed. All were adequate. Attention turned to the right medial thigh. Previously marked lines of incision made after infiltration of lidocaine + epi. Gracilis muscle identified and was more robust than expected given patients overall habitus. Muscle bluntly dissected from adductor nicole and longus with limited cautery. Hemostasis obtained with limited cautery and medium hemoclips. Distal insertion divided. Muscle then elevated and pedicle identified. Tunnel developed in the deep fascial plane to the wound site. Developing tissue planes and dissection approaching the proctocolectomy resection site was exceptionally difficult. With skin resection and release a path for the flap was developed.  The gracilis flap was delivered into the perineal and lower abdominal defect without tension. Vascular inflow verified by capillary fill at the flap and ultrasound at the pedicle. 10 flat rich drain placed through lower incision and posterior to gracilis flap. Secured with 3-0 silk. Donor site closed with combination of 3-0 monocryl and deep dermal staples. Skin and recipient site closed simiarly. Drains:   Xavier-Schaefer Drain 08/27/21 Anterior;Right Leg (Active)   Site Assessment Drainage (comment) 08/29/21 1358   Dressing Status Intact 08/29/21 1358   Status Patent; Charged;Draining 08/29/21 1358   Drainage Color Serosanguinous 08/29/21 1741   Output (ml) 15 ml 08/29/21 1741       Venancio Drain #1 08/27/21 Right Abdomen (Active)   Site Assessment Clean, dry, & intact 08/29/21 1358   Dressing Status Clean, dry, & intact 08/29/21 1358   Drainage Description Sanguinous 08/29/21 1358   Venancio Drain Airleak No 08/29/21 1358   Status Patent; Charged;Draining 08/29/21 1358   Y Connector Used No 08/29/21 1358   Output (ml) 30 ml 08/29/21 1358       [REMOVED] Xavier-Schaefer Drain 05/11/21 Right;Posterior Buttocks (Removed)       [REMOVED] Xavier-Schaefer Drain 07/08/21 Right Back (Removed)       [REMOVED] Xavier-Schaefer Drain 08/05/21 Right; Lower Back (Removed)   Site Assessment Other (Comment) 08/27/21 0634   Dressing Status Clean, dry, & intact 08/27/21 0634   Status Patent; Other (comment) 08/27/21 0634   Drainage Color Other(Comment) 08/27/21 0634       [REMOVED] Drain 8 Fr presacral abscess drain 05/13/21 Posterior;Right Hip (Removed)       [REMOVED] Drain 10Fr right lower posterior Back 05/27/21 Posterior;Right Hip (Removed)       [REMOVED] Drain 07/08/21 Right; Outer; Lower  (Removed)

## 2021-08-30 NOTE — PROGRESS NOTES
END OF SHIFT NOTE:    INTAKE/OUTPUT  08/29 0701 - 08/30 0700  In: 0292 [P.O.:1540; I.V.:3322]  Out: 3350 [Urine:2775; Drains:155]  Voiding: NO  Catheter: YES  Drain:   Xavier-Schaefer Drain 08/27/21 Anterior;Right Leg (Active)   Site Assessment Clean, dry, & intact 08/30/21 1641   Dressing Status New 08/30/21 1641   Status Patent; Charged;Draining 08/30/21 1641   Drainage Color Serosanguinous 08/30/21 1641   Output (ml) 40 ml 08/30/21 1641       Venancio Drain #1 08/27/21 Right Abdomen (Active)   Site Assessment Clean, dry, & intact 08/30/21 1641   Dressing Status Clean, dry, & intact 08/30/21 1641   Drainage Description Serosanguinous 08/30/21 1641   Venancio Drain Airleak No 08/30/21 1641   Status Charged; Patent;Draining 08/30/21 1641   Y Connector Used No 08/30/21 1641   Output (ml) 10 ml 08/30/21 1641               Flatus: Patient does have flatus present. Stool:  1 emptied colostomy occurrences. Characteristics:  Stool Assessment  Stool Color: Brown  Stool Appearance: Loose  Stool Amount: Other (Comment) (50)  Stool Source/Status: Colostomy    Emesis: 0 occurrences. Characteristics:        VITAL SIGNS  Patient Vitals for the past 12 hrs:   Temp Pulse Resp BP SpO2   08/30/21 1526 99 °F (37.2 °C) 80  (!) 142/74 97 %   08/30/21 1115 98.8 °F (37.1 °C) 76 17 123/67 96 %   08/30/21 0732 99.1 °F (37.3 °C) 78 17 (!) 113/58 97 %       Pain Assessment  Pain Intensity 1: 0 (08/30/21 1346)  Pain Location 1: Abdomen  Pain Intervention(s) 1: Medication (see MAR)  Patient Stated Pain Goal: 0    Ambulating  Yes    Shift report given to oncoming nurse at the bedside.     Ranae Skiff, RN

## 2021-08-30 NOTE — PROGRESS NOTES
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 Tolerating Clear Liquids. No nausea or vomiting. Bishop patent. AF, NAD. Pt c/o incisional pain and leg pain. OBJECTIVE:  Constitutional: Alert oriented cooperative patient in no acute distress; appears stated age   Visit Vitals  BP (!) 113/58   Pulse 78   Temp 99.1 °F (37.3 °C)   Resp 17   Ht 5' 8\" (1.727 m)   Wt 132 lb 9.6 oz (60.1 kg)   SpO2 97%   BMI 20.16 kg/m²     Eyes: Sclera are clear. ENMT: no external lesions gross hearing normal; no obvious neck masses, no ear or lip lesions  CV: RRR. Normal perfusion  Resp: No JVD. Breathing is  non-labored; no audible wheezing. GI: soft and non-distended, appropriately ttp, incision with some serosanguinous drainage. DIANE drain x 1 - Serosanguinous 70mL 24hr output. Colostomy with 420mL stool output. : Bishop 2700mL 24hr outpput  Musculoskeletal: No embolic signs or cyanosis.  DIANE drain Right thigh - Serosanguinous 85ml 24hr output  Neuro: moves all 4; no focal deficits  Psychiatric: normal affect and mood      Patient Vitals for the past 24 hrs:   BP Temp Pulse Resp SpO2   08/30/21 0732 (!) 113/58 99.1 °F (37.3 °C) 78 17 97 %   08/30/21 0243 116/66 97.9 °F (36.6 °C) 76 16 94 %   08/29/21 2257 (!) 147/75 98.5 °F (36.9 °C) 71 16 97 %   08/29/21 1800 131/72 98.2 °F (36.8 °C) 80 17 93 %   08/29/21 1630 135/75 99.7 °F (37.6 °C) 83 17 95 %   08/29/21 1144 118/70 98.5 °F (36.9 °C) 75 18 97 %     Labs:    Recent Labs     08/28/21  0621 08/27/21  1630 08/27/21  1630 08/27/21  1330 08/27/21  1156   WBC 6.8   < > 9.9   < > 4.7   HGB 9.4*   < > 9.1*   < > 9.3*   *   < > 128*   < > 151     --   --    < > 141   K 4.0  --   --    < > 3.9   *  --   --    < > 113*   CO2 27  --   --    < > 22   BUN 13  --   --    < > 23   CREA 0.89  --   --    < > 1.01   GLU 98  --   --    < > 127*   PTP  --   --  16.3*   < > 15.1*   INR  --   --  1.3   < > 1.2   APTT  --   --  28.1   < > 62.9*   TBILI  --   --   --   --  0.3   ALT  --   --   --   --  24   AP  --   --   --   --  47*    < > = values in this interval not displayed.      BARBRA Mathias

## 2021-08-30 NOTE — PROGRESS NOTES
CC: rectal drainage and abscess    HPI: 67 yo with hx of multiple abdominal procedures, LAR with persistent mucoid discharge from the residual anal stump that has become significantly debilitating. No new issues.  Tolerating clears    Past Medical History:   Diagnosis Date    Acute deep vein thrombosis (DVT) of non-extremity vein 5/20/2019    Family history of malignant neoplasm of gastrointestinal tract     mother colon/ovarian cancer 67    Fecal incontinence     LAR syndrome    Former cigarette smoker     History of rectal cancer     Hypothyroid     stable w/med    Infection and inflammatory reaction due to other internal prosthetic devices, implants and grafts, subsequent encounter 2017    abd wound/mesh colostomy    Insomnia     takes meds    Major depression     Osteoarthritis, hand     Personal history of colonic polyps 9/2013    x 1    Personal history of malignant neoplasm of rectum, rectosigmoid junction, and anus 9/2013    Psychiatric disorder     anxiety     Past Surgical History:   Procedure Laterality Date    COLONOSCOPY N/A 2/12/2018    COLONOSCOPY / BMI=21 performed by Inocencia Cortés MD at [id number] [facility] N/A 3/5/2021    COLONOSCOPY/BMI 19 performed by Bernabe Alfonso MD at [address]  2/12/2018         COLONOSCOPY THRU STOMA,LESN RMVL W/SNARE  3/5/2021         ENDOSCOPY, COLON, DIAGNOSTIC  last 2/3/15    [facility]--no polyps--3 year recall    HX COLOSTOMY  5/11/16    HX GI  4/2016    Sacral nerve stimlator lead trial (unsucessful) and removal    HX HERNIA REPAIR  3/2015, 5/2016    HX HERNIA REPAIR      and Colostomy in May    HX OTHER SURGICAL  10/7/2013    Emily---endorectal us    HX OTHER SURGICAL Bilateral     cataracts  2017    HX POLYPECTOMY      HX TOTAL COLECTOMY  11/2013    Emily--lap LAR with coloproctostomy, mobilization splenic flexure, diverting loop ileostomy    HX TOTAL COLECTOMY Right 8/2014    for leak  HX VASCULAR ACCESS Left 4/14    single lumen port/subsequently removed    IR CHANGE ABSCESS / CYST CATHETER  5/27/2021    IR CHANGE ABSCESS / CYST CATHETER  6/14/2021    IR CHANGE ABSCESS / CYST CATHETER  7/8/2021    IR CHANGE ABSCESS / CYST CATHETER  8/5/2021    IR INJ ABS CYST VIA CATHETER / TUBE  5/20/2021    IR INJ ABS CYST VIA CATHETER / TUBE  6/4/2021    IR INSERT TUNL CVC W/O PORT OVER 5 YR  5/20/2019    IR REPLACE CVC TUNNELED W/O PORT  5/30/2019     A&O x3  Resp unlabored  Abd soft. Ostomy bag in place. Drains x 2 serosang. Brito straw. Closure at the perineum and right thigh intact. A/P    Continue drains. Continue brito. Continue xeroform and gauze incision sites at right thigh.

## 2021-08-30 NOTE — PROGRESS NOTES
ACUTE PHYSICAL THERAPY GOALS:  (Developed with and agreed upon by patient and/or caregiver. )  LTG:  (1.)Mr. Marroquin will move from supine to sit and sit to supine, scoot up and down and roll side to side in bed INDEPENDENTLY with bed flat within 7 treatment day(s). (2.)Mr. Marroquin will transfer from bed to chair and chair to bed INDEPENDENTLY within 7 treatment day(s). (3.)Mr. Marroquin will ambulate with MODIFIED INDEPENDENCE for 450+ feet with the least restrictive device within 7 treatment day(s). (4.)Mr. Marroquin will perform exercises per HEP independently to improve strength and mobility within 7 days. (5.)Mr. Marroquin will ascend and descend 10 steps with STAND BY ASSIST within 7 days.   ________________________________________________________________________________________________      PHYSICAL THERAPY ASSESSMENT: Initial Assessment and AM PT Treatment Day # 1      Enrike Jang is a 66 y.o. 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]  Rectal discharge [R71.9]  Other complications of colostomy ([facility] [address] [K94.09]  Incisional hernia with obstruction [K43.0]  Procedure(s) (LRB):  Completion proctectomy, perineal and abdominal approach Repair of cystotomy (N/A)  RIGHT  GRACILIS FLAP (Right)  3 Days Post-Op  Reason for Referral:  Mobility following above surgery  ICD-10: Treatment Diagnosis: Generalized Muscle Weakness (M62.81)  Difficulty in walking, Not elsewhere classified (R26.2)  Other abnormalities of gait and mobility (R26.89)  INPATIENT: [place] / [place] B / Product Type: Medicare /     ASSESSMENT:     REHAB RECOMMENDATIONS:   Recommendation to date pending progress:  [address] [place] [address]  Equipment:    To Be Determined     PRIOR LEVEL OF FUNCTION:  (Prior to Hospitalization) INITIAL/CURRENT LEVEL OF FUNCTION:  (Most Recently Demonstrated)   Bed Mobility:   Independent  Sit to Stand:   Independent  Transfers:   Independent  Gait/Mobility:   Independent Bed Mobility:   Minimal Assistance  Sit to Stand:   Contact Guard Assistance  Transfers:   Contact Guard Assistance  Gait/Mobility:   Contact Guard Assistance     ASSESSMENT:  Mr. Essence Koo is seen for initial therapy assessment following above surgery. Pt lives alone and is independent at baseline without DME use. He presents with generalized weakness and decreased activity tolerance. Needs min assist for transfer to sitting and is limited by pain. CGA for sit-stand transfers. Worked on standing static/dynamic balance and pre-gait activities at edge of bed. Ambulatory in room and hallway x 300 ft with narrow base of support and shuffling pattern. Verbal cues for gait safety, step clearance/length, and posture. Pt returned to room and up to chair after activity, positioned comfortably with needs in reach. Hopefully can dc home with [facility] PT as mobility, strength, and balance improve. SUBJECTIVE:   Mr. Essence Koo states, \"I think it's just this pain. \"    SOCIAL HISTORY/LIVING ENVIRONMENT: lives alone. Independent without DME use.    Home Environment: Private residence  # Steps to Enter: 2  One/Two Story Residence: Two story  Living Alone: Yes  Support Systems: Friends \ neighbors  OBJECTIVE:     PAIN: VITAL SIGNS: LINES/DRAINS:   Pre Treatment: Pain Screen  Pain Scale 1: Numeric (0 - 10)  Pain Intensity 1: 0  Post Treatment: 0/10 Vital Signs  O2 Device: None Bishop Catheter, IV and DIANE Drain  O2 Device: None     GROSS EVALUATION:   Within Functional Limits Abnormal/ Functional Abnormal/ Non-Functional (see comments) Not Tested Comments:   AROM [x] [] [] []    PROM [] [] [] []    Strength [] [x] [] []    Balance [] [x] [] []    Posture [] [x] [] []    Sensation [] [] [] []    Coordination [] [] [] []    Tone [] [] [] []    Edema [] [] [] []    Activity Tolerance [] [x] [] []     [] [] [] []      COGNITION/  PERCEPTION: Intact Impaired   (see comments) Comments:   Orientation [] []    Vision [x] []    Hearing [x] []    Command Following [x] []    Safety Awareness [] [x]     [] []      MOBILITY: I Mod I S SBA CGA Min Mod Max Total  NT x2 Comments:   Bed Mobility    Rolling [] [] [] [] [x] [] [] [] [] [] []    Supine to Sit [] [] [] [] [] [x] [] [] [] [] []    Scooting [] [] [] [] [x] [] [] [] [] [] []    Sit to Supine [] [] [] [] [] [] [] [] [] [] []    Transfers    Sit to Stand [] [] [] [x] [] [] [] [] [] [] []    Bed to Chair [] [] [] [] [x] [] [] [] [] [] []    Stand to Sit [] [] [] [x] [] [] [] [] [] [] []    I=Independent, Mod I=Modified Independent, S=Supervision, SBA=Standby Assistance, CGA=Contact Guard Assistance,   Min=Minimal Assistance, Mod=Moderate Assistance, Max=Maximal Assistance, Total=Total Assistance, NT=Not Tested  GAIT: I Mod I S SBA CGA Min Mod Max Total  NT x2 Comments:   Level of Assistance [] [] [] [x] [x] [] [] [] [] [] []    Distance 300 ft    DME None    Gait Quality Shuffling, narrow base of support    Weightbearing Status N/A     I=Independent, Mod I=Modified Independent, S=Supervision, SBA=Standby Assistance, CGA=Contact Guard Assistance,   Min=Minimal Assistance, Mod=Moderate Assistance, Max=Maximal Assistance, Total=Total Assistance, NT=Not Tested    [facility] Form       How much difficulty does the patient currently have. .. Unable A Lot A Little None   1. Turning over in bed (including adjusting bedclothes, sheets and blankets)? [] 1   [] 2   [] 3   [x] 4   2. Sitting down on and standing up from a chair with arms ( e.g., wheelchair, bedside commode, etc.)   [] 1   [] 2   [x] 3   [] 4   3. Moving from lying on back to sitting on the side of the bed? [] 1   [] 2   [x] 3   [] 4   How much help from another person does the patient currently need. .. Total A Lot A Little None   4. Moving to and from a bed to a chair (including a wheelchair)?    [] 1   [] 2 [x] 3   [] 4   5. Need to walk in hospital room? [] 1   [] 2   [x] 3   [] 4   6. Climbing 3-5 steps with a railing? [] 1   [x] 2   [] 3   [] 4   © 2007, Trustees of [address] [place] [address] [address] [id number], under license to [email or web address]. All rights reserved     Score:  Initial: 18 Most Recent: X (Date: -- )    Interpretation of Tool:  Represents activities that are increasingly more difficult (i.e. Bed mobility, Transfers, Gait). PLAN:   FREQUENCY/DURATION: PT Plan of Care: 3 times/week for duration of hospital stay or until stated goals are met, whichever comes first.    PROBLEM LIST:   (Skilled intervention is medically necessary to address:)  1. Decreased Activity Tolerance  2. Decreased Balance  3. Decreased Coordination  4. Decreased Gait Ability  5. Decreased Strength  6. Decreased Transfer Abilities  7. Increased Pain   INTERVENTIONS PLANNED:   (Benefits and precautions of physical therapy have been discussed with the patient.)  1. Therapeutic Activity  2. Therapeutic Exercise/HEP  3. Neuromuscular Re-education  4. Gait Training  5. Manual Therapy  6. Education     TREATMENT:     EVALUATION: Low Complexity : (Untimed Charge)    TREATMENT:   ($$ Therapeutic Activity: 8-22 mins    )  Therapeutic Activity (10 Minutes): Therapeutic activity included Rolling, Supine to Sit, Scooting, Lateral Scooting, Transfer Training, Ambulation on level ground, Sitting balance  and Standing balance to improve functional Mobility, Strength, Activity tolerance and balance.     TREATMENT GRID:  N/A    AFTER TREATMENT POSITION/PRECAUTIONS:  Chair, Needs within reach and RN notified    INTERDISCIPLINARY COLLABORATION:  RN/PCT and PT/PTA    TOTAL TREATMENT DURATION:  PT Patient Time In/Time Out  Time In: 1033  Time Out: [address], DPT

## 2021-08-31 LAB
ABO + RH BLD: NORMAL
ANION GAP SERPL CALC-SCNC: 2 MMOL/L (ref 7–16)
BASOPHILS # BLD: 0 K/UL (ref 0–0.2)
BASOPHILS NFR BLD: 1 % (ref 0–2)
BLD PROD TYP BPU: NORMAL
BLOOD GROUP ANTIBODIES SERPL: NORMAL
BPU ID: NORMAL
BUN SERPL-MCNC: 7 MG/DL (ref 8–23)
CALCIUM SERPL-MCNC: 8.2 MG/DL (ref 8.3–10.4)
CHLORIDE SERPL-SCNC: 107 MMOL/L (ref 98–107)
CO2 SERPL-SCNC: 31 MMOL/L (ref 21–32)
CREAT SERPL-MCNC: 0.9 MG/DL (ref 0.8–1.5)
CROSSMATCH RESULT,%XM: NORMAL
DIFFERENTIAL METHOD BLD: ABNORMAL
EOSINOPHIL # BLD: 0.1 K/UL (ref 0–0.8)
EOSINOPHIL NFR BLD: 4 % (ref 0.5–7.8)
ERYTHROCYTE [DISTWIDTH] IN BLOOD BY AUTOMATED COUNT: 14 % (ref 11.9–14.6)
GLUCOSE SERPL-MCNC: 103 MG/DL (ref 65–100)
HCT VFR BLD AUTO: 26.5 % (ref 41.1–50.3)
HGB BLD-MCNC: 8.4 G/DL (ref 13.6–17.2)
IMM GRANULOCYTES # BLD AUTO: 0 K/UL (ref 0–0.5)
IMM GRANULOCYTES NFR BLD AUTO: 1 % (ref 0–5)
LYMPHOCYTES # BLD: 0.3 K/UL (ref 0.5–4.6)
LYMPHOCYTES NFR BLD: 8 % (ref 13–44)
MCH RBC QN AUTO: 29.3 PG (ref 26.1–32.9)
MCHC RBC AUTO-ENTMCNC: 31.7 G/DL (ref 31.4–35)
MCV RBC AUTO: 92.3 FL (ref 79.6–97.8)
MONOCYTES # BLD: 0.5 K/UL (ref 0.1–1.3)
MONOCYTES NFR BLD: 13 % (ref 4–12)
NEUTS SEG # BLD: 2.9 K/UL (ref 1.7–8.2)
NEUTS SEG NFR BLD: 74 % (ref 43–78)
NRBC # BLD: 0 K/UL (ref 0–0.2)
PLATELET # BLD AUTO: 110 K/UL (ref 150–450)
PMV BLD AUTO: 9.4 FL (ref 9.4–12.3)
POTASSIUM SERPL-SCNC: 3.1 MMOL/L (ref 3.5–5.1)
RBC # BLD AUTO: 2.87 M/UL (ref 4.23–5.6)
SODIUM SERPL-SCNC: 140 MMOL/L (ref 138–145)
SPECIMEN EXP DATE BLD: NORMAL
STATUS OF UNIT,%ST: NORMAL
UNIT DIVISION, %UDIV: 0
WBC # BLD AUTO: 3.9 K/UL (ref 4.3–11.1)

## 2021-08-31 PROCEDURE — 74011000258 HC RX REV CODE- 258: Performed by: PHYSICIAN ASSISTANT

## 2021-08-31 PROCEDURE — 99024 POSTOP FOLLOW-UP VISIT: CPT | Performed by: PHYSICIAN ASSISTANT

## 2021-08-31 PROCEDURE — 74011250636 HC RX REV CODE- 250/636: Performed by: PHYSICIAN ASSISTANT

## 2021-08-31 PROCEDURE — APPNB30 APP NON BILLABLE TIME 0-30 MINS: Performed by: PHYSICIAN ASSISTANT

## 2021-08-31 PROCEDURE — APPSS30 APP SPLIT SHARED TIME 16-30 MINUTES: Performed by: PHYSICIAN ASSISTANT

## 2021-08-31 PROCEDURE — 85025 COMPLETE CBC W/AUTO DIFF WBC: CPT

## 2021-08-31 PROCEDURE — 3331090001 HH PPS REVENUE CREDIT

## 2021-08-31 PROCEDURE — 2709999900 HC NON-CHARGEABLE SUPPLY

## 2021-08-31 PROCEDURE — 74011000250 HC RX REV CODE- 250: Performed by: PHYSICIAN ASSISTANT

## 2021-08-31 PROCEDURE — 65270000029 HC RM PRIVATE

## 2021-08-31 PROCEDURE — 97116 GAIT TRAINING THERAPY: CPT

## 2021-08-31 PROCEDURE — 80048 BASIC METABOLIC PNL TOTAL CA: CPT

## 2021-08-31 PROCEDURE — 74011250637 HC RX REV CODE- 250/637: Performed by: PHYSICIAN ASSISTANT

## 2021-08-31 PROCEDURE — 74011250637 HC RX REV CODE- 250/637: Performed by: NURSE PRACTITIONER

## 2021-08-31 PROCEDURE — 36415 COLL VENOUS BLD VENIPUNCTURE: CPT

## 2021-08-31 PROCEDURE — 97110 THERAPEUTIC EXERCISES: CPT

## 2021-08-31 PROCEDURE — 3331090002 HH PPS REVENUE DEBIT

## 2021-08-31 RX ORDER — POTASSIUM CHLORIDE 14.9 MG/ML
20 INJECTION INTRAVENOUS
Status: COMPLETED | OUTPATIENT
Start: 2021-08-31 | End: 2021-08-31

## 2021-08-31 RX ADMIN — Medication 10 ML: at 14:40

## 2021-08-31 RX ADMIN — HEPARIN SODIUM 5000 UNITS: 5000 INJECTION INTRAVENOUS; SUBCUTANEOUS at 05:28

## 2021-08-31 RX ADMIN — HEPARIN SODIUM 5000 UNITS: 5000 INJECTION INTRAVENOUS; SUBCUTANEOUS at 22:02

## 2021-08-31 RX ADMIN — Medication 10 ML: at 05:43

## 2021-08-31 RX ADMIN — PIPERACILLIN SODIUM AND TAZOBACTAM SODIUM 3.38 G: 3; .375 INJECTION, POWDER, LYOPHILIZED, FOR SOLUTION INTRAVENOUS at 21:17

## 2021-08-31 RX ADMIN — HEPARIN SODIUM 5000 UNITS: 5000 INJECTION INTRAVENOUS; SUBCUTANEOUS at 14:39

## 2021-08-31 RX ADMIN — DEXTROSE MONOHYDRATE AND SODIUM CHLORIDE 125 ML/HR: 5; .45 INJECTION, SOLUTION INTRAVENOUS at 05:42

## 2021-08-31 RX ADMIN — DEXTROSE MONOHYDRATE AND SODIUM CHLORIDE 125 ML/HR: 5; .45 INJECTION, SOLUTION INTRAVENOUS at 23:38

## 2021-08-31 RX ADMIN — DEXTROSE MONOHYDRATE AND SODIUM CHLORIDE 125 ML/HR: 5; .45 INJECTION, SOLUTION INTRAVENOUS at 14:38

## 2021-08-31 RX ADMIN — FAMOTIDINE 20 MG: 20 TABLET, FILM COATED ORAL at 17:54

## 2021-08-31 RX ADMIN — PIPERACILLIN SODIUM AND TAZOBACTAM SODIUM 3.38 G: 3; .375 INJECTION, POWDER, LYOPHILIZED, FOR SOLUTION INTRAVENOUS at 14:40

## 2021-08-31 RX ADMIN — POTASSIUM CHLORIDE 20 MEQ: 14.9 INJECTION, SOLUTION INTRAVENOUS at 11:49

## 2021-08-31 RX ADMIN — AMITRIPTYLINE HYDROCHLORIDE 100 MG: 50 TABLET, FILM COATED ORAL at 22:02

## 2021-08-31 RX ADMIN — OLANZAPINE 7.5 MG: 5 TABLET, FILM COATED ORAL at 22:00

## 2021-08-31 RX ADMIN — CLONAZEPAM 6 MG: 1 TABLET ORAL at 21:58

## 2021-08-31 RX ADMIN — FAMOTIDINE 20 MG: 20 TABLET, FILM COATED ORAL at 08:36

## 2021-08-31 RX ADMIN — PIPERACILLIN SODIUM AND TAZOBACTAM SODIUM 3.38 G: 3; .375 INJECTION, POWDER, LYOPHILIZED, FOR SOLUTION INTRAVENOUS at 05:32

## 2021-08-31 RX ADMIN — LEVOTHYROXINE SODIUM 125 MCG: 0.12 TABLET ORAL at 05:28

## 2021-08-31 RX ADMIN — POTASSIUM CHLORIDE 20 MEQ: 14.9 INJECTION, SOLUTION INTRAVENOUS at 14:38

## 2021-08-31 NOTE — PROGRESS NOTES
END OF SHIFT NOTE:    INTAKE/OUTPUT  08/30 0701 - 08/31 0700  In: 2658.4 [I.V.:2658.4]  Out: 8060 [Urine:4200; Drains:155]  Voiding: NO  Catheter: YES. Draining. Drain:   Xavier-Schaefer Drain 08/27/21 Anterior;Right Leg (Active)   Site Assessment Clean, dry, & intact 08/31/21 0611   Dressing Status Clean, dry, & intact 08/31/21 0611   Status Patent; Charged 08/31/21 0611   Drainage Color Serosanguinous 08/31/21 0611   Output (ml) 10 ml 08/31/21 0611       Venancio Drain #1 08/27/21 Right Abdomen (Active)   Site Assessment Clean, dry, & intact 08/30/21 1916   Dressing Status Clean, dry, & intact 08/30/21 1916   Drainage Description Serosanguinous 08/30/21 1916   Venancio Drain Airleak No 08/30/21 1916   Status Charged; Patent;Draining 08/30/21 1916   Y Connector Used No 08/30/21 1916   Output (ml) 5 ml 08/30/21 1916               Flatus: Patient Ostomy has air present. Burped twice. Stool:  2 occurrences. Emptied bag once, bag began to refill. Characteristics:  Stool Assessment  Stool Color: Brown  Stool Appearance: Loose  Stool Amount: Medium  Stool Source/Status: Colostomy    Emesis: 0 occurrences. Characteristics:        VITAL SIGNS  Patient Vitals for the past 12 hrs:   Temp Pulse Resp BP SpO2   08/31/21 0046 98.2 °F (36.8 °C) 75 18 119/60 92 %   08/30/21 1845 97.4 °F (36.3 °C) 90 18 (!) 154/77 97 %       Pain Assessment  Pain Intensity 1: 0 (08/30/21 1916)  Pain Location 1: Abdomen  Pain Intervention(s) 1: Medication (see MAR)  Patient Stated Pain Goal: 0    Ambulating  Yes    Shift report to be given to oncoming nurse at the bedside.     Zita Calero RN

## 2021-08-31 NOTE — PROGRESS NOTES
ACUTE PHYSICAL THERAPY GOALS:  (Developed with and agreed upon by patient and/or caregiver. )  LTG:  (1.)Mr. Marroquin will move from supine to sit and sit to supine, scoot up and down and roll side to side in bed INDEPENDENTLY with bed flat within 7 treatment day(s). (2.)Mr. Marroquin will transfer from bed to chair and chair to bed INDEPENDENTLY within 7 treatment day(s). (3.)Mr. Marroquin will ambulate with MODIFIED INDEPENDENCE for 450+ feet with the least restrictive device within 7 treatment day(s). (4.)Mr. Marroquin will perform exercises per HEP independently to improve strength and mobility within 7 days. (5.)Mr. Marroquin will ascend and descend 10 steps with STAND BY ASSIST within 7 days. PHYSICAL THERAPY: Daily Note and AM Treatment Day # 2    Lennox Reyez is a 66 y.o. 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]  Incisional hernia with obstruction [K43.0]  Procedure(s) (LRB):  Completion proctectomy, perineal and abdominal approach Repair of cystotomy (N/A)  RIGHT  GRACILIS FLAP (Right)  4 Days Post-Op    ASSESSMENT:     REHAB RECOMMENDATIONS: CURRENT LEVEL OF FUNCTION:  (Most Recently Demonstrated)   Recommendation to date pending progress:  [address] [place] [address] Therapy  Equipment:    To Be Determined possibly a RW Bed Mobility:   Standby Assistance  Sit to Stand:  Myla Loya Assistance  Transfers:   Not tested  Gait/Mobility:  [employer] Assistance     ASSESSMENT:  Mr. Antonio Lal was supine and eager to get up and amb. He amb 600' with RW slowly with standing rests. He required CGA d/t being a little unsteady. He also bumped into objects on his right several times but denied decreased vision. He sat EOB and rested, then stood and amb additional 300' . He sat in chair and performed ex. SUBJECTIVE:   Mr. Antonio Lal asked Xiomara Gun you take me for a walk? \" Stated he wanted to use a walker and afterwards that he felt better using it. SOCIAL HISTORY/ LIVING ENVIRONMENT: lives alone. Independent without DME use.   Home Environment: Private residence  # Steps to Enter: 2  One/Two Story Residence: Two story  Living Alone: Yes  Support Systems: Friends \ neighbors  OBJECTIVE:     PAIN: VITAL SIGNS: LINES/DRAINS:   Pre Treatment: Pain Screen  Pain Intensity 1: 0  Post Treatment: 0   Bishop Catheter, IV and DIANE Drain x 2  O2 Device: None (Room air)     MOBILITY: I Mod I S SBA CGA Min Mod Max Total  NT x2 Comments:   Bed Mobility    Rolling [] [] [] [] [] [] [] [] [] [] []    Supine to Sit [] [] [] [x] [] [] [] [] [] [] []    Scooting [] [] [] [x] [] [] [] [] [] [] []    Sit to Supine [] [] [] [] [] [] [] [] [] [] []    Transfers    Sit to Stand [] [] [] [x] [] [] [] [] [] [] []    Bed to Chair [] [] [] [] [] [] [] [] [] [] []    Stand to Sit [] [] [] [x] [] [] [] [] [] [] []    I=Independent, Mod I=Modified Independent, S=Supervision, SBA=Standby Assistance, CGA=Contact Guard Assistance,   Min=Minimal Assistance, Mod=Moderate Assistance, Max=Maximal Assistance, Total=Total Assistance, NT=Not Tested    BALANCE: Good Fair+ Fair Fair- Poor NT Comments   Sitting Static [] [x] [] [] [] []    Sitting Dynamic [] [x] [] [] [] []              Standing Static [] [] [x] [] [] []    Standing Dynamic [] [] [x] [] [] []      GAIT: I Mod I S SBA CGA Min Mod Max Total  NT x2 Comments:   Level of Assistance [] [] [] [] [] [] [] [] [] [] []    Distance 600' + 300'    DME Rolling Walker    Gait Quality Short steps    Weightbearing  Status N/A     I=Independent, Mod I=Modified Independent, S=Supervision, SBA=Standby Assistance, CGA=Contact Guard Assistance,   Min=Minimal Assistance, Mod=Moderate Assistance, Max=Maximal Assistance, Total=Total Assistance, NT=Not Tested    PLAN:   FREQUENCY/DURATION: PT Plan of Care: 3 times/week for duration of hospital stay or until stated goals are met, whichever comes first.  TREATMENT:     TREATMENT:   ($$ Gait Trainin-37 mins  $$ Therapeutic Exercises: 8-22 mins    )  Therapeutic Exercise (10 Minutes): Therapeutic exercises noted below to improve functional activity tolerance, strength and mobility. Gait Training (30 Minutes): Gait training for 600 + 300 feet utilizing [address] [place] [address]. Patient required Tactile and Verbal cueing to improve Assistive Device Utilization and Dynamic Standing Balance.      TREATMENT GRID:   Date:   Date:   Date:     Activity/Exercise Seated Parameters Parameters Parameters   Heel raises X 15 B      Toe raises X 15 B     LAQ's X 15 B     Hip Flex X 15 B     Hip ABD                            AFTER TREATMENT POSITION/PRECAUTIONS:  Alarm Activated, Chair and Needs within reach    INTERDISCIPLINARY COLLABORATION:  RN/PCT and PT/PTA    TOTAL TREATMENT DURATION:  PT Patient Time In/Time Out  Time In: 1055  Time Out: [address], PTA

## 2021-08-31 NOTE — PROGRESS NOTES
PLAN:  IV Abx - Zosyn  IVFs  Pain/ nausea control  Continue FLD until ostomy output increases  Routine drain care  Pain control  Labs in AM  Bishop Cath - DO NOT REMOVE  Urology follow up outpatient if needed  SCD  IS  I&O  DIANE drain x 2  SQ Heparin started 8/30  Recheck H/H in AM  Replace K+    ASSESSMENT:  Admit Date: 8/27/2021   4 Day Post-Op  Procedure(s):  Completion proctectomy, perineal and abdominal approach Repair of cystotomy  RIGHT  GRACILIS FLAP    Principal Problem:    Rectal fistula (8/27/2021)    Active Problems:    H/O urinary retention (8/27/2021)         SUBJECTIVE:  8/30/21 POD3 Pt awake in bed. Tolerating Clear Liquids. No nausea or vomiting. Bishop patent. AF, NAD. Pt c/o incisional pain and leg pain. 8/31/21 POD4  Pt up ambulating hallways. Tolerating FLD.  -N/V. AF, some HTN overnight. 4.2L UOP. Loralie Ursula with 45mL out, leg drain with 110mL out. Colostomy with 250mL otu. WBC 3.9, H/H 8.4/26. 5. Cr 0.9, K+ 3.1. OBJECTIVE:  Constitutional: Alert oriented cooperative patient in no acute distress; appears stated age   Visit Vitals  /74   Pulse 75   Temp 98.1 °F (36.7 °C)   Resp 18   Ht 5' 8\" (1.727 m)   Wt 132 lb 9.6 oz (60.1 kg)   SpO2 94%   BMI 20.16 kg/m²     Eyes: Sclera are clear. ENMT: no external lesions gross hearing normal; no obvious neck masses, no ear or lip lesions  CV: RRR. Normal perfusion  Resp: No JVD. Breathing is  non-labored; no audible wheezing. GI: soft and non-distended, appropriately ttp, dressing c/d/i; DIANE drain x 1 - Serosanguinous. Colostomy with small amount of stool output. : Bishop in place  Musculoskeletal: No embolic signs or cyanosis.  DIANE drain Right thigh - Serosanguinous  Neuro: moves all 4; no focal deficits  Psychiatric: normal affect and mood      Patient Vitals for the past 24 hrs:   BP Temp Pulse Resp SpO2   08/31/21 0708 134/74 98.1 °F (36.7 °C) 75 18 94 %   08/31/21 0046 119/60 98.2 °F (36.8 °C) 75 18 92 %   08/30/21 1845 (!) 154/77 97.4 °F (36.3 °C) 90 18 97 %   08/30/21 1526 (!) 142/74 99 °F (37.2 °C) 80  97 %     Labs:    Recent Labs     08/31/21  0344   WBC 3.9*   HGB 8.4*   *      K 3.1*      CO2 31   BUN 7*   CREA 0.90   *     Ezequiel Hernandez [facility]

## 2021-08-31 NOTE — PROGRESS NOTES
END OF SHIFT NOTE:    INTAKE/OUTPUT  08/30 0701 - 08/31 0700  In: 2658.4 [I.V.:2658.4]  Out: 2290 [Urine:4200; Drains:155]  Voiding: YES  Catheter: YES  Drain:   Xavier-Schaefer Drain 08/27/21 Anterior;Right Leg (Active)   Site Assessment Bleeding;Drainage (comment) 08/31/21 1325   Dressing Status New 08/31/21 1325   Status Patent; Charged;Draining;Leakage around drain 08/31/21 1325   Drainage Color Serosanguinous 08/31/21 1447   Output (ml) 20 ml 08/31/21 1447       Venancio Drain #1 08/27/21 Right Abdomen (Active)   Site Assessment Clean, dry, & intact 08/31/21 1325   Dressing Status Clean, dry, & intact 08/31/21 1325   Drainage Description Serosanguinous 08/31/21 1325   Venancio Drain Airleak No 08/31/21 1325   Status Patent; Charged;Draining 08/31/21 1325   Y Connector Used No 08/31/21 1325   Output (ml) 10 ml 08/31/21 1325               Flatus: Patient does not have flatus present. Stool:  2 occurrences. Characteristics:  Stool Assessment  Stool Color: Brown  Stool Appearance: Loose  Stool Amount: Small  Stool Source/Status: Colostomy    Emesis: 0 occurrences. Characteristics:        VITAL SIGNS  Patient Vitals for the past 12 hrs:   Temp Pulse Resp BP SpO2   08/31/21 1537 97.8 °F (36.6 °C) 66 18 (!) 158/78 97 %   08/31/21 1135 97.3 °F (36.3 °C) 68 18 124/72 97 %       Pain Assessment  Pain Intensity 1: 0 (08/31/21 1300)  Pain Location 1: Abdomen  Pain Intervention(s) 1: Medication (see MAR)  Patient Stated Pain Goal: 0    Ambulating  Yes    Shift report given to oncoming nurse at the bedside.     Romy Lazo RN

## 2021-09-01 LAB
BASOPHILS # BLD: 0 K/UL (ref 0–0.2)
BASOPHILS NFR BLD: 0 % (ref 0–2)
DIFFERENTIAL METHOD BLD: ABNORMAL
EOSINOPHIL # BLD: 0.2 K/UL (ref 0–0.8)
EOSINOPHIL NFR BLD: 5 % (ref 0.5–7.8)
ERYTHROCYTE [DISTWIDTH] IN BLOOD BY AUTOMATED COUNT: 13.7 % (ref 11.9–14.6)
HCT VFR BLD AUTO: 26.8 % (ref 41.1–50.3)
HGB BLD-MCNC: 8.4 G/DL (ref 13.6–17.2)
IMM GRANULOCYTES # BLD AUTO: 0 K/UL (ref 0–0.5)
IMM GRANULOCYTES NFR BLD AUTO: 1 % (ref 0–5)
LYMPHOCYTES # BLD: 0.3 K/UL (ref 0.5–4.6)
LYMPHOCYTES NFR BLD: 7 % (ref 13–44)
MCH RBC QN AUTO: 28.9 PG (ref 26.1–32.9)
MCHC RBC AUTO-ENTMCNC: 31.3 G/DL (ref 31.4–35)
MCV RBC AUTO: 92.1 FL (ref 79.6–97.8)
MONOCYTES # BLD: 0.4 K/UL (ref 0.1–1.3)
MONOCYTES NFR BLD: 12 % (ref 4–12)
NEUTS SEG # BLD: 2.6 K/UL (ref 1.7–8.2)
NEUTS SEG NFR BLD: 75 % (ref 43–78)
NRBC # BLD: 0 K/UL (ref 0–0.2)
PLATELET # BLD AUTO: 133 K/UL (ref 150–450)
PMV BLD AUTO: 9 FL (ref 9.4–12.3)
RBC # BLD AUTO: 2.91 M/UL (ref 4.23–5.6)
WBC # BLD AUTO: 3.5 K/UL (ref 4.3–11.1)

## 2021-09-01 PROCEDURE — 97530 THERAPEUTIC ACTIVITIES: CPT

## 2021-09-01 PROCEDURE — 3331090001 HH PPS REVENUE CREDIT

## 2021-09-01 PROCEDURE — 3331090002 HH PPS REVENUE DEBIT

## 2021-09-01 PROCEDURE — 85025 COMPLETE CBC W/AUTO DIFF WBC: CPT

## 2021-09-01 PROCEDURE — 74011000258 HC RX REV CODE- 258: Performed by: PHYSICIAN ASSISTANT

## 2021-09-01 PROCEDURE — 74011250637 HC RX REV CODE- 250/637: Performed by: NURSE PRACTITIONER

## 2021-09-01 PROCEDURE — 74011000250 HC RX REV CODE- 250: Performed by: PHYSICIAN ASSISTANT

## 2021-09-01 PROCEDURE — 74011250636 HC RX REV CODE- 250/636: Performed by: PHYSICIAN ASSISTANT

## 2021-09-01 PROCEDURE — 65270000029 HC RM PRIVATE

## 2021-09-01 PROCEDURE — 74011250637 HC RX REV CODE- 250/637: Performed by: PHYSICIAN ASSISTANT

## 2021-09-01 PROCEDURE — 2709999900 HC NON-CHARGEABLE SUPPLY

## 2021-09-01 PROCEDURE — 36415 COLL VENOUS BLD VENIPUNCTURE: CPT

## 2021-09-01 RX ADMIN — LEVOTHYROXINE SODIUM 125 MCG: 0.12 TABLET ORAL at 05:38

## 2021-09-01 RX ADMIN — HEPARIN SODIUM 5000 UNITS: 5000 INJECTION INTRAVENOUS; SUBCUTANEOUS at 15:03

## 2021-09-01 RX ADMIN — HEPARIN SODIUM 5000 UNITS: 5000 INJECTION INTRAVENOUS; SUBCUTANEOUS at 22:22

## 2021-09-01 RX ADMIN — DEXTROSE MONOHYDRATE AND SODIUM CHLORIDE 125 ML/HR: 5; .45 INJECTION, SOLUTION INTRAVENOUS at 07:17

## 2021-09-01 RX ADMIN — FAMOTIDINE 20 MG: 20 TABLET, FILM COATED ORAL at 08:51

## 2021-09-01 RX ADMIN — CLONAZEPAM 6 MG: 1 TABLET ORAL at 22:15

## 2021-09-01 RX ADMIN — FAMOTIDINE 20 MG: 20 TABLET, FILM COATED ORAL at 17:07

## 2021-09-01 RX ADMIN — PIPERACILLIN SODIUM AND TAZOBACTAM SODIUM 3.38 G: 3; .375 INJECTION, POWDER, LYOPHILIZED, FOR SOLUTION INTRAVENOUS at 05:28

## 2021-09-01 RX ADMIN — PIPERACILLIN SODIUM AND TAZOBACTAM SODIUM 3.38 G: 3; .375 INJECTION, POWDER, LYOPHILIZED, FOR SOLUTION INTRAVENOUS at 22:19

## 2021-09-01 RX ADMIN — OLANZAPINE 7.5 MG: 5 TABLET, FILM COATED ORAL at 22:18

## 2021-09-01 RX ADMIN — AMITRIPTYLINE HYDROCHLORIDE 100 MG: 50 TABLET, FILM COATED ORAL at 22:17

## 2021-09-01 RX ADMIN — HEPARIN SODIUM 5000 UNITS: 5000 INJECTION INTRAVENOUS; SUBCUTANEOUS at 05:32

## 2021-09-01 RX ADMIN — PIPERACILLIN SODIUM AND TAZOBACTAM SODIUM 3.38 G: 3; .375 INJECTION, POWDER, LYOPHILIZED, FOR SOLUTION INTRAVENOUS at 15:02

## 2021-09-01 NOTE — PROGRESS NOTES
PLAN:  IV Abx - Zosyn  Decrease IVFs to 50 and likely DC in AM  Pain/ nausea control  GI soft diet  Routine drain care  Pain control  Labs in AM  Bishop Cath - DO NOT REMOVE  Urology follow up outpatient if needed  SCD  IS  I&O  DIANE drain x 2  SQ Heparin started 8/30  Disposition pending     ASSESSMENT:  Admit Date: 8/27/2021   5 Day Post-Op  Procedure(s):  Completion proctectomy, perineal and abdominal approach Repair of cystotomy  RIGHT  GRACILIS FLAP    Principal Problem:    Rectal fistula (8/27/2021)    Active Problems:    H/O urinary retention (8/27/2021)         SUBJECTIVE:  8/30/21 POD3 Pt awake in bed. Tolerating Clear Liquids. No nausea or vomiting. Bishop patent. AF, NAD. Pt c/o incisional pain and leg pain. 8/31/21 POD4  Pt up ambulating hallways. Tolerating FLD.  -N/V. AF, some HTN overnight. 4.2L UOP. Joanna Laos with 45mL out, leg drain with 110mL out. Colostomy with 250mL otu. WBC 3.9, H/H 8.4/26. 5. Cr 0.9, K+ 3.1.     9/1/21 5 Days Post-Op no complaints. Pain controlled. AF, VSS. 1075mL from ostomy. 90mL/24h from kia and 175mL/24h from DIANE drain. 5.8L UOP. H/H 8.4/26.8      OBJECTIVE:  Constitutional: Alert oriented cooperative patient in no acute distress; appears stated age   Visit Vitals  BP (!) 148/78   Pulse 75   Temp 97.4 °F (36.3 °C)   Resp 18   Ht 5' 8\" (1.727 m)   Wt 132 lb 9.6 oz (60.1 kg)   SpO2 96%   BMI 20.16 kg/m²     Eyes: Sclera are clear. ENMT: no external lesions gross hearing normal; no obvious neck masses, no ear or lip lesions  CV: RRR. Normal perfusion  Resp: No JVD. Breathing is  non-labored; no audible wheezing. GI: soft and non-distended, appropriately ttp, dressing c/d/i; drains serosang. Colostomy with small amount of stool output. : Bishop in place  Musculoskeletal: No embolic signs or cyanosis.  DIANE drain Right thigh - Serosanguinous  Neuro: moves all 4; no focal deficits  Psychiatric: normal affect and mood      Patient Vitals for the past 24 hrs:   BP Temp Pulse Resp SpO2   09/01/21 0650 (!) 148/78 97.4 °F (36.3 °C) 75 18 96 %   09/01/21 0247 125/71 98.2 °F (36.8 °C) 68 18 95 %   08/31/21 2307 (!) 145/68 98.2 °F (36.8 °C) 63 18 96 %   08/31/21 1859 134/75 97.9 °F (36.6 °C) 63 18 99 %   08/31/21 1537 (!) 158/78 97.8 °F (36.6 °C) 66 18 97 %   08/31/21 1135 124/72 97.3 °F (36.3 °C) 68 18 97 %     Labs:    Recent Labs     09/01/21  0452 08/31/21  0344 08/31/21  0344   WBC 3.5*   < > 3.9*   HGB 8.4*   < > 8.4*   *   < > 110*   NA  --   --  140   K  --   --  3.1*   CL  --   --  107   CO2  --   --  31   BUN  --   --  7*   CREA  --   --  0.90   GLU  --   --  103*    < > = values in this interval not displayed.      Lalito Tran, MAZIN

## 2021-09-01 NOTE — PROGRESS NOTES
ACUTE PHYSICAL THERAPY GOALS:  (Developed with and agreed upon by patient and/or caregiver. )  LTG:  (1.)Mr. Marroquin will move from supine to sit and sit to supine, scoot up and down and roll side to side in bed INDEPENDENTLY with bed flat within 7 treatment day(s). (2.)Mr. Marroquin will transfer from bed to chair and chair to bed INDEPENDENTLY within 7 treatment day(s). (3.)Mr. Marroquin will ambulate with MODIFIED INDEPENDENCE for 450+ feet with the least restrictive device within 7 treatment day(s). (4.)Mr. Marroquin will perform exercises per HEP independently to improve strength and mobility within 7 days. (5.)Mr. Marroquin will ascend and descend 10 steps with STAND BY ASSIST within 7 days. PHYSICAL THERAPY: Daily Note and AM Treatment Day # 3    Yumiko Toscano is a 66 y.o. 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]  Rectal discharge [Y25.2]  Other complications of colostomy ([facility] [address] [K94.09]  Incisional hernia with obstruction [K43.0]  Procedure(s) (LRB):  Completion proctectomy, perineal and abdominal approach Repair of cystotomy (N/A)  RIGHT  GRACILIS FLAP (Right)  5 Days Post-Op    ASSESSMENT:     REHAB RECOMMENDATIONS: CURRENT LEVEL OF FUNCTION:  (Most Recently Demonstrated)   Recommendation to date pending progress:  [address] [place] [address] Therapy  Equipment:    To Be Determined possibly a RW Bed Mobility:   Standby Assistance  Sit to Stand:  [employer] Assistance  Transfers:   Not tested  Gait/Mobility:  [employer] Assistance     ASSESSMENT:  Mr. Galo Smith was supine and eager to get up and amb. He amb 1000' with RW slowly with standing rests. He required CGA d/t being a little unsteady. Worked on balance during amb looking side to side and up and down. He did not bumped into objects on his right today as he did yesterday so good progress. He sat EOB and rested, then stood and performed LE ex in standing.       SUBJECTIVE:   Mr. Galo Smith asked Natividad Eli you here to walk with me?\"    SOCIAL HISTORY/ LIVING ENVIRONMENT: lives alone. Independent without DME use.   Home Environment: Private residence  # Steps to Enter: 2  One/Two Story Residence: Two story  Living Alone: Yes  Support Systems: Friends \ neighbors  OBJECTIVE:     PAIN: VITAL SIGNS: LINES/DRAINS:   Pre Treatment: Pain Screen  Pain Scale 1: Numeric (0 - 10)  Pain Intensity 1: 0  Post Treatment: 0   Bishop Catheter, IV and DIANE Drain x 2  O2 Device: None (Room air)     MOBILITY: I Mod I S SBA CGA Min Mod Max Total  NT x2 Comments:   Bed Mobility    Rolling [] [] [] [] [] [] [] [] [] [] []    Supine to Sit [] [] [] [x] [] [] [] [] [] [] []    Scooting [] [] [] [x] [] [] [] [] [] [] []    Sit to Supine [] [] [] [] [] [] [] [] [] [] []    Transfers    Sit to Stand [] [] [] [x] [] [] [] [] [] [] []    Bed to Chair [] [] [] [] [] [] [] [] [] [] []    Stand to Sit [] [] [] [x] [] [] [] [] [] [] []    I=Independent, Mod I=Modified Independent, S=Supervision, SBA=Standby Assistance, CGA=Contact Guard Assistance,   Min=Minimal Assistance, Mod=Moderate Assistance, Max=Maximal Assistance, Total=Total Assistance, NT=Not Tested    BALANCE: Good Fair+ Fair Fair- Poor NT Comments   Sitting Static [] [x] [] [] [] []    Sitting Dynamic [] [x] [] [] [] []              Standing Static [] [] [x] [] [] []    Standing Dynamic [] [] [x] [] [] []      GAIT: I Mod I S SBA CGA Min Mod Max Total  NT x2 Comments:   Level of Assistance [] [] [] [] [] [] [] [] [] [] []    Distance 1000'    DME Gait Belt    Gait Quality Short steps, decreased arm swing    Weightbearing  Status N/A     I=Independent, Mod I=Modified Independent, S=Supervision, SBA=Standby Assistance, CGA=Contact Guard Assistance,   Min=Minimal Assistance, Mod=Moderate Assistance, Max=Maximal Assistance, Total=Total Assistance, NT=Not Tested    PLAN:   FREQUENCY/DURATION: PT Plan of Care: 3 times/week for duration of hospital stay or until stated goals are met, whichever comes first.  TREATMENT: TREATMENT:   ($$ Therapeutic Activity: 38-52 mins    )  Therapeutic Activity (40 Minutes): Therapeutic activity included Supine to Sit, Sit to Supine, Ambulation on level ground, Sitting balance , Standing balance and standing ex to improve functional Mobility, Strength and Activity tolerance.     TREATMENT GRID:   Date:  8/31 Date:   Date:     Activity/Exercise Seated Parameters Parameters Parameters   Heel raises X 15 B      Toe raises X 15 B     LAQ's X 15 B     Hip Flex X 15 B     Hip ABD                            AFTER TREATMENT POSITION/PRECAUTIONS:  Needs within reach and EOB    INTERDISCIPLINARY COLLABORATION:  RN/PCT and PT/PTA    TOTAL TREATMENT DURATION:  PT Patient Time In/Time Out  Time In: 1036  Time Out: 300 [address]

## 2021-09-01 NOTE — PROGRESS NOTES
END OF SHIFT NOTE:    INTAKE/OUTPUT  08/31 0701 - 09/01 0700  In: 0576 [P.O.:420; I.V.:3236]  Out: 1913 [Urine:5825; Drains:265]  Voiding: YES  Catheter: YES  Drain:   Xavier-Schaefer Drain 08/27/21 Anterior;Right Leg (Active)   Site Assessment Clean, dry, & intact 09/01/21 1503   Dressing Status Clean, dry, & intact 09/01/21 1503   Status Patent; Charged;Draining 09/01/21 1503   Drainage Color Serous 09/01/21 1503   Output (ml) 35 ml 09/01/21 1503       Venancio Drain #1 08/27/21 Right Abdomen (Active)   Site Assessment Clean, dry, & intact 09/01/21 1503   Dressing Status Clean, dry, & intact 09/01/21 1503   Drainage Description Serous 09/01/21 1503   Venancio Drain Airleak No 09/01/21 1503   Status Patent; Charged;Draining 09/01/21 1503   Y Connector Used No 09/01/21 1503   Output (ml) 10 ml 09/01/21 1503               Flatus: Patient does have flatus present. Stool:  1 occurrences. Characteristics:  Stool Assessment  Stool Color: Brown  Stool Appearance: Loose  Stool Amount: Small  Stool Source/Status: Colostomy    Emesis: 0 occurrences. Characteristics:        VITAL SIGNS  Patient Vitals for the past 12 hrs:   Temp Pulse Resp BP SpO2   09/01/21 1709  62  (!) 157/77    09/01/21 1556 97.3 °F (36.3 °C) 73 18 (!) 183/94 100 %   09/01/21 1146 98.1 °F (36.7 °C) 77 17 138/77 98 %   09/01/21 0650 97.4 °F (36.3 °C) 75 18 (!) 148/78 96 %       Pain Assessment  Pain Intensity 1: 0 (09/01/21 1100)  Pain Location 1: Abdomen  Pain Intervention(s) 1: Medication (see MAR)  Patient Stated Pain Goal: 0    Ambulating  Yes    Shift report given to oncoming nurse at the bedside.     Hong Liriano RN

## 2021-09-01 NOTE — PROGRESS NOTES
END OF SHIFT NOTE:    INTAKE/OUTPUT  08/31 0701 - 09/01 0700  In: 6082 [P.O.:420; I.V.:3236]  Out: 9425 [Urine:5525; Drains:265]  Voiding: NO  Catheter: YES. Draining. Drain:   Xavier-Schaefer Drain 08/27/21 Anterior;Right Leg (Active)   Site Assessment Intact;Drainage (comment) 09/01/21 0539   Dressing Status Intact; New drainage 09/01/21 0539   Status Patent; Charged 09/01/21 0539   Drainage Color Serous 09/01/21 0539   Output (ml) 20 ml 09/01/21 0539       Venancio Drain #1 08/27/21 Right Abdomen (Active)   Site Assessment Clean, dry, & intact 09/01/21 0255   Dressing Status Clean, dry, & intact 09/01/21 0255   Drainage Description Serous 09/01/21 0255   Venancio Drain Airleak No 09/01/21 0255   Status Patent; Charged 09/01/21 0255   Y Connector Used No 08/31/21 2222   Output (ml) 80 ml 08/31/21 2222               Flatus: Patient does not have flatus present. Ostomy present, air in ostomy bag. Stool:  1 occurrences. Emptied ostomy one time this shift. Ostomy still draining. Characteristics:  Stool Assessment  Stool Color: Brown  Stool Appearance: Loose  Stool Amount: Small  Stool Source/Status: Colostomy    Emesis: 0 occurrences. Characteristics:        VITAL SIGNS  Patient Vitals for the past 12 hrs:   Temp Pulse Resp BP SpO2   09/01/21 0247 98.2 °F (36.8 °C) 68 18 125/71 95 %   08/31/21 2307 98.2 °F (36.8 °C) 63 18 (!) 145/68 96 %   08/31/21 1859 97.9 °F (36.6 °C) 63 18 134/75 99 %       Pain Assessment  Pain Intensity 1: 0 (08/31/21 1931)  Pain Location 1: Abdomen  Pain Intervention(s) 1: Medication (see MAR)  Patient Stated Pain Goal: 0    Ambulating  No. Pt stood up a few times but did not ambulate. Ambulated during day shift. Shift report to be given to oncoming nurse at the bedside.     Simba Whittington RN

## 2021-09-01 NOTE — PROGRESS NOTES
Problem: Falls - Risk of  Goal: *Absence of Falls  Description: Document [place] Fall Risk and appropriate interventions in the flowsheet. Outcome: Progressing Towards Goal  Note: Fall Risk Interventions:  Mobility Interventions: Communicate number of staff needed for ambulation/transfer, Patient to call before getting OOB, Utilize walker, cane, or other assistive device         Medication Interventions: Patient to call before getting OOB, Teach patient to arise slowly    Elimination Interventions: Call light in reach, Patient to call for help with toileting needs    History of Falls Interventions: Consult care management for discharge planning, Investigate reason for fall, Room close to nurse's station         Problem: Patient Education: Go to Patient Education Activity  Goal: Patient/Family Education  Outcome: Progressing Towards Goal     Problem: Infection - Risk of, Surgical Site Infection  Goal: *Absence of surgical site infection signs and symptoms  Outcome: Progressing Towards Goal     Problem: Patient Education: Go to Patient Education Activity  Goal: Patient/Family Education  Outcome: Progressing Towards Goal     Problem: Pressure Injury - Risk of  Goal: *Prevention of pressure injury  Description: Document Josef Scale and appropriate interventions in the flowsheet.   Outcome: Progressing Towards Goal  Note: Pressure Injury Interventions:       Moisture Interventions: Absorbent underpads, Internal/External fecal devices, Internal/External urinary devices    Activity Interventions: Increase time out of bed, Pressure redistribution bed/mattress(bed type), PT/OT evaluation    Mobility Interventions: Pressure redistribution bed/mattress (bed type), PT/OT evaluation    Nutrition Interventions: Document food/fluid/supplement intake, Offer support with meals,snacks and hydration    Friction and Shear Interventions: Lift sheet, Minimize layers                Problem: Patient Education: Go to Patient Education Activity  Goal: Patient/Family Education  Outcome: Progressing Towards Goal     Problem: Surgical Pathway Day of Surgery  Goal: Activity/Safety  Outcome: Progressing Towards Goal  Goal: Consults, if ordered  Outcome: Progressing Towards Goal  Goal: Nutrition/Diet  Outcome: Progressing Towards Goal  Goal: Medications  Outcome: Progressing Towards Goal  Goal: Respiratory  Outcome: Progressing Towards Goal  Goal: Treatments/Interventions/Procedures  Outcome: Progressing Towards Goal  Goal: Psychosocial  Outcome: Progressing Towards Goal  Goal: *No signs and symptoms of infection or wound complications  Outcome: Progressing Towards Goal  Goal: *Optimal pain control at patient's stated goal  Outcome: Progressing Towards Goal  Goal: *Adequate urinary output (equal to or greater than 30 milliliters/hour)  Description: Ambulatory Surgery patients voiding without difficulty.   Outcome: Progressing Towards Goal  Goal: *Hemodynamically stable  Outcome: Progressing Towards Goal  Goal: *Tolerating diet  Outcome: Progressing Towards Goal  Goal: *Demonstrates progressive activity  Outcome: Progressing Towards Goal     Problem: Surgical Pathway Post-Op Day 1  Goal: Off Pathway (Use only if patient is Off Pathway)  Outcome: Progressing Towards Goal  Goal: Activity/Safety  Outcome: Progressing Towards Goal  Goal: Diagnostic Test/Procedures  Outcome: Progressing Towards Goal  Goal: Nutrition/Diet  Outcome: Progressing Towards Goal  Goal: Discharge Planning  Outcome: Progressing Towards Goal  Goal: Medications  Outcome: Progressing Towards Goal  Goal: Respiratory  Outcome: Progressing Towards Goal  Goal: Treatments/Interventions/Procedures  Outcome: Progressing Towards Goal  Goal: Psychosocial  Outcome: Progressing Towards Goal  Goal: *No signs and symptoms of infection or wound complications  Outcome: Progressing Towards Goal  Goal: *Optimal pain control at patient's stated goal  Outcome: Progressing Towards Goal  Goal: *Adequate urinary output (equal to or greater than 30 milliliters/hour)  Outcome: Progressing Towards Goal  Goal: *Hemodynamically stable  Outcome: Progressing Towards Goal  Goal: *Tolerating diet  Outcome: Progressing Towards Goal  Goal: *Demonstrates progressive activity  Outcome: Progressing Towards Goal  Goal: *Lungs clear or at baseline  Outcome: Progressing Towards Goal     Problem: Surgical Pathway Post-Op Day 2 through Discharge  Goal: Off Pathway (Use only if patient is Off Pathway)  Outcome: Progressing Towards Goal  Goal: Activity/Safety  Outcome: Progressing Towards Goal  Goal: Nutrition/Diet  Outcome: Progressing Towards Goal  Goal: Discharge Planning  Outcome: Progressing Towards Goal  Goal: Medications  Outcome: Progressing Towards Goal  Goal: Respiratory  Outcome: Progressing Towards Goal  Goal: Treatments/Interventions/Procedures  Outcome: Progressing Towards Goal  Goal: Psychosocial  Outcome: Progressing Towards Goal  Goal: *No signs and symptoms of infection or wound complications  Outcome: Progressing Towards Goal  Goal: *Optimal pain control at patient's stated goal  Outcome: Progressing Towards Goal  Goal: *Adequate urinary output (equal to or greater than 30 milliliters/hour)  Outcome: Progressing Towards Goal  Goal: *Hemodynamically stable  Outcome: Progressing Towards Goal  Goal: *Tolerating diet  Outcome: Progressing Towards Goal  Goal: *Demonstrates progressive activity  Outcome: Progressing Towards Goal  Goal: *Lungs clear or at baseline  Outcome: Progressing Towards Goal     Problem: Surgical Pathway: Discharge Outcomes  Goal: *Hemodynamically stable  Outcome: Progressing Towards Goal  Goal: *Lungs clear or at baseline  Outcome: Progressing Towards Goal  Goal: *Demonstrates independent activity or return to baseline  Outcome: Progressing Towards Goal  Goal: *Optimal pain control at patient's stated goal  Outcome: Progressing Towards Goal  Goal: *Verbalizes understanding and describes prescribed diet  Outcome: Progressing Towards Goal  Goal: *Tolerating diet  Outcome: Progressing Towards Goal  Goal: *Verbalizes name, dosage, time, side effects, and number of days to continue medications  Outcome: Progressing Towards Goal  Goal: *No signs and symptoms of infection or wound complications  Outcome: Progressing Towards Goal  Goal: *Anxiety reduced or absent  Outcome: Progressing Towards Goal  Goal: *Understands and describes signs and symptoms to report to providers(Stroke Metric)  Outcome: Progressing Towards Goal  Goal: *Describes follow-up/return visits to physicians  Outcome: Progressing Towards Goal  Goal: *Describes available resources and support systems  Outcome: Progressing Towards Goal     Problem: Patient Education: Go to Patient Education Activity  Goal: Patient/Family Education  Outcome: Progressing Towards Goal

## 2021-09-01 NOTE — PROGRESS NOTES
Problem: Falls - Risk of  Goal: *Absence of Falls  Description: Document [place] Fall Risk and appropriate interventions in the flowsheet. Outcome: Progressing Towards Goal  Note: Fall Risk Interventions:  Mobility Interventions: Communicate number of staff needed for ambulation/transfer, Patient to call before getting OOB         Medication Interventions: Patient to call before getting OOB, Teach patient to arise slowly    Elimination Interventions: Call light in reach, Patient to call for help with toileting needs    History of Falls Interventions: Room close to nurse's station         Problem: Patient Education: Go to Patient Education Activity  Goal: Patient/Family Education  Outcome: Progressing Towards Goal     Problem: Infection - Risk of, Surgical Site Infection  Goal: *Absence of surgical site infection signs and symptoms  Outcome: Progressing Towards Goal     Problem: Patient Education: Go to Patient Education Activity  Goal: Patient/Family Education  Outcome: Progressing Towards Goal     Problem: Pressure Injury - Risk of  Goal: *Prevention of pressure injury  Description: Document Josef Scale and appropriate interventions in the flowsheet.   Outcome: Progressing Towards Goal  Note: Pressure Injury Interventions:       Moisture Interventions: Apply protective barrier, creams and emollients, Absorbent underpads, Internal/External fecal devices, Internal/External urinary devices    Activity Interventions: Increase time out of bed, Pressure redistribution bed/mattress(bed type)    Mobility Interventions: Pressure redistribution bed/mattress (bed type)    Nutrition Interventions: Document food/fluid/supplement intake    Friction and Shear Interventions: Apply protective barrier, creams and emollients, Minimize layers                Problem: Patient Education: Go to Patient Education Activity  Goal: Patient/Family Education  Outcome: Progressing Towards Goal     Problem: Surgical Pathway Day of Surgery  Goal: Activity/Safety  Outcome: Progressing Towards Goal  Goal: Consults, if ordered  Outcome: Progressing Towards Goal  Goal: Nutrition/Diet  Outcome: Progressing Towards Goal  Goal: Medications  Outcome: Progressing Towards Goal  Goal: Respiratory  Outcome: Progressing Towards Goal  Goal: Treatments/Interventions/Procedures  Outcome: Progressing Towards Goal  Goal: Psychosocial  Outcome: Progressing Towards Goal  Goal: *No signs and symptoms of infection or wound complications  Outcome: Progressing Towards Goal  Goal: *Optimal pain control at patient's stated goal  Outcome: Progressing Towards Goal  Goal: *Adequate urinary output (equal to or greater than 30 milliliters/hour)  Description: Ambulatory Surgery patients voiding without difficulty.   Outcome: Progressing Towards Goal  Goal: *Hemodynamically stable  Outcome: Progressing Towards Goal  Goal: *Tolerating diet  Outcome: Progressing Towards Goal  Goal: *Demonstrates progressive activity  Outcome: Progressing Towards Goal     Problem: Surgical Pathway Post-Op Day 1  Goal: Off Pathway (Use only if patient is Off Pathway)  Outcome: Progressing Towards Goal  Goal: Activity/Safety  Outcome: Progressing Towards Goal  Goal: Diagnostic Test/Procedures  Outcome: Progressing Towards Goal  Goal: Nutrition/Diet  Outcome: Progressing Towards Goal  Goal: Discharge Planning  Outcome: Progressing Towards Goal  Goal: Medications  Outcome: Progressing Towards Goal  Goal: Respiratory  Outcome: Progressing Towards Goal  Goal: Treatments/Interventions/Procedures  Outcome: Progressing Towards Goal  Goal: Psychosocial  Outcome: Progressing Towards Goal  Goal: *No signs and symptoms of infection or wound complications  Outcome: Progressing Towards Goal  Goal: *Optimal pain control at patient's stated goal  Outcome: Progressing Towards Goal  Goal: *Adequate urinary output (equal to or greater than 30 milliliters/hour)  Outcome: Progressing Towards Goal  Goal: *Hemodynamically stable  Outcome: Progressing Towards Goal  Goal: *Tolerating diet  Outcome: Progressing Towards Goal  Goal: *Demonstrates progressive activity  Outcome: Progressing Towards Goal  Goal: *Lungs clear or at baseline  Outcome: Progressing Towards Goal     Problem: Surgical Pathway Post-Op Day 2 through Discharge  Goal: Off Pathway (Use only if patient is Off Pathway)  Outcome: Progressing Towards Goal  Goal: Activity/Safety  Outcome: Progressing Towards Goal  Goal: Nutrition/Diet  Outcome: Progressing Towards Goal  Goal: Discharge Planning  Outcome: Progressing Towards Goal  Goal: Medications  Outcome: Progressing Towards Goal  Goal: Respiratory  Outcome: Progressing Towards Goal  Goal: Treatments/Interventions/Procedures  Outcome: Progressing Towards Goal  Goal: Psychosocial  Outcome: Progressing Towards Goal  Goal: *No signs and symptoms of infection or wound complications  Outcome: Progressing Towards Goal  Goal: *Optimal pain control at patient's stated goal  Outcome: Progressing Towards Goal  Goal: *Adequate urinary output (equal to or greater than 30 milliliters/hour)  Outcome: Progressing Towards Goal  Goal: *Hemodynamically stable  Outcome: Progressing Towards Goal  Goal: *Tolerating diet  Outcome: Progressing Towards Goal  Goal: *Demonstrates progressive activity  Outcome: Progressing Towards Goal  Goal: *Lungs clear or at baseline  Outcome: Progressing Towards Goal     Problem: Surgical Pathway: Discharge Outcomes  Goal: *Hemodynamically stable  Outcome: Progressing Towards Goal  Goal: *Lungs clear or at baseline  Outcome: Progressing Towards Goal  Goal: *Demonstrates independent activity or return to baseline  Outcome: Progressing Towards Goal  Goal: *Optimal pain control at patient's stated goal  Outcome: Progressing Towards Goal  Goal: *Verbalizes understanding and describes prescribed diet  Outcome: Progressing Towards Goal  Goal: *Tolerating diet  Outcome: Progressing Towards Goal  Goal: *Verbalizes name, dosage, time, side effects, and number of days to continue medications  Outcome: Progressing Towards Goal  Goal: *No signs and symptoms of infection or wound complications  Outcome: Progressing Towards Goal  Goal: *Anxiety reduced or absent  Outcome: Progressing Towards Goal  Goal: *Understands and describes signs and symptoms to report to providers(Stroke Metric)  Outcome: Progressing Towards Goal  Goal: *Describes follow-up/return visits to physicians  Outcome: Progressing Towards Goal  Goal: *Describes available resources and support systems  Outcome: Progressing Towards Goal     Problem: Patient Education: Go to Patient Education Activity  Goal: Patient/Family Education  Outcome: Progressing Towards Goal

## 2021-09-02 ENCOUNTER — HOSPITAL ENCOUNTER (OUTPATIENT)
Dept: INTERVENTIONAL RADIOLOGY/VASCULAR | Age: 76
Discharge: HOME | End: 2021-09-02
Attending: PHYSICIAN ASSISTANT

## 2021-09-02 ENCOUNTER — HOSPITAL ENCOUNTER (OUTPATIENT)
Dept: INTERVENTIONAL RADIOLOGY/VASCULAR | Age: 76
Discharge: HOME | End: 2021-09-02
Attending: SURGERY

## 2021-09-02 PROCEDURE — 65270000029 HC RM PRIVATE

## 2021-09-02 PROCEDURE — 3331090002 HH PPS REVENUE DEBIT

## 2021-09-02 PROCEDURE — 3331090001 HH PPS REVENUE CREDIT

## 2021-09-02 PROCEDURE — 74011000258 HC RX REV CODE- 258: Performed by: PHYSICIAN ASSISTANT

## 2021-09-02 PROCEDURE — 74011250636 HC RX REV CODE- 250/636: Performed by: PHYSICIAN ASSISTANT

## 2021-09-02 PROCEDURE — 97116 GAIT TRAINING THERAPY: CPT

## 2021-09-02 PROCEDURE — 74011000250 HC RX REV CODE- 250: Performed by: NURSE PRACTITIONER

## 2021-09-02 PROCEDURE — 74011250637 HC RX REV CODE- 250/637: Performed by: PHYSICIAN ASSISTANT

## 2021-09-02 PROCEDURE — 74011250637 HC RX REV CODE- 250/637: Performed by: NURSE PRACTITIONER

## 2021-09-02 PROCEDURE — 74011000258 HC RX REV CODE- 258: Performed by: NURSE PRACTITIONER

## 2021-09-02 PROCEDURE — 2709999900 HC NON-CHARGEABLE SUPPLY

## 2021-09-02 PROCEDURE — 74011250636 HC RX REV CODE- 250/636: Performed by: NURSE PRACTITIONER

## 2021-09-02 RX ADMIN — FAMOTIDINE 20 MG: 20 TABLET, FILM COATED ORAL at 08:43

## 2021-09-02 RX ADMIN — HEPARIN SODIUM 5000 UNITS: 5000 INJECTION INTRAVENOUS; SUBCUTANEOUS at 05:49

## 2021-09-02 RX ADMIN — PIPERACILLIN SODIUM AND TAZOBACTAM SODIUM 3.38 G: 3; .375 INJECTION, POWDER, LYOPHILIZED, FOR SOLUTION INTRAVENOUS at 22:21

## 2021-09-02 RX ADMIN — LEVOTHYROXINE SODIUM 125 MCG: 0.12 TABLET ORAL at 05:49

## 2021-09-02 RX ADMIN — CLONAZEPAM 6 MG: 1 TABLET ORAL at 22:21

## 2021-09-02 RX ADMIN — PIPERACILLIN SODIUM AND TAZOBACTAM SODIUM 3.38 G: 3; .375 INJECTION, POWDER, LYOPHILIZED, FOR SOLUTION INTRAVENOUS at 14:49

## 2021-09-02 RX ADMIN — AMITRIPTYLINE HYDROCHLORIDE 100 MG: 50 TABLET, FILM COATED ORAL at 22:21

## 2021-09-02 RX ADMIN — Medication 10 ML: at 14:50

## 2021-09-02 RX ADMIN — PIPERACILLIN SODIUM AND TAZOBACTAM SODIUM 3.38 G: 3; .375 INJECTION, POWDER, LYOPHILIZED, FOR SOLUTION INTRAVENOUS at 05:52

## 2021-09-02 RX ADMIN — OLANZAPINE 7.5 MG: 5 TABLET, FILM COATED ORAL at 22:21

## 2021-09-02 RX ADMIN — HEPARIN SODIUM 5000 UNITS: 5000 INJECTION INTRAVENOUS; SUBCUTANEOUS at 22:21

## 2021-09-02 RX ADMIN — DEXTROSE MONOHYDRATE AND SODIUM CHLORIDE 50 ML/HR: 5; .45 INJECTION, SOLUTION INTRAVENOUS at 02:38

## 2021-09-02 RX ADMIN — HEPARIN SODIUM 5000 UNITS: 5000 INJECTION INTRAVENOUS; SUBCUTANEOUS at 14:50

## 2021-09-02 RX ADMIN — Medication 10 ML: at 22:28

## 2021-09-02 RX ADMIN — FAMOTIDINE 20 MG: 20 TABLET, FILM COATED ORAL at 17:18

## 2021-09-02 NOTE — PROGRESS NOTES
ACUTE PHYSICAL THERAPY GOALS:  (Developed with and agreed upon by patient and/or caregiver. )  LTG:  (1.)Mr. Marroquin will move from supine to sit and sit to supine, scoot up and down and roll side to side in bed INDEPENDENTLY with bed flat within 7 treatment day(s). (2.)Mr. Marroquin will transfer from bed to chair and chair to bed INDEPENDENTLY within 7 treatment day(s). (3.)Mr. Marroquin will ambulate with MODIFIED INDEPENDENCE for 450+ feet with the least restrictive device within 7 treatment day(s). (4.)Mr. Marroquin will perform exercises per HEP independently to improve strength and mobility within 7 days. (5.)Mr. Marroquin will ascend and descend 10 steps with STAND BY ASSIST within 7 days. PHYSICAL THERAPY: Daily Note and AM Treatment Day # 4    Elías Short is a 66 y.o. 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]  Rectal discharge [S50.8]  Other complications of colostomy ([address] [K94.09]  Incisional hernia with obstruction [K43.0]  Procedure(s) (LRB):  Completion proctectomy, perineal and abdominal approach Repair of cystotomy (N/A)  RIGHT  GRACILIS FLAP (Right)  6 Days Post-Op    ASSESSMENT:     REHAB RECOMMENDATIONS: CURRENT LEVEL OF FUNCTION:  (Most Recently Demonstrated)   Recommendation to date pending progress:  Setting:   No further skilled therapy   Equipment:    None  Bed Mobility:   Supervision  Sit to Stand:   Supervision  Transfers:   Not tested  Gait/Mobility:   Standby Assistance to supervision     ASSESSMENT:  Mr. Ximena Sacnhez was supine and eager to get up and amb. He amb 1000'+ with no assistive device slowly with SBA working on arm swing and dynamic balance looking side to side and up and down. Doing much better progressing toward goals. SUBJECTIVE:   Mr. Ximena Sanchez stated \"I'm ready\". SOCIAL HISTORY/ LIVING ENVIRONMENT: lives alone. Independent without DME use.   Home Environment: Private residence  # Steps to Enter: 2  One/Two Story Residence: Two story  Living Alone: Yes  Support Systems: Friends \ neighbors  OBJECTIVE:     PAIN: VITAL SIGNS: LINES/DRAINS:   Pre Treatment: Pain Screen  Pain Scale 1: Numeric (0 - 10)  Pain Intensity 1: 0  Post Treatment: 0   Bisohp Catheter, IV and DIAEN Drain x 2  O2 Device: None (Room air)     MOBILITY: I Mod I S SBA CGA Min Mod Max Total  NT x2 Comments:   Bed Mobility    Rolling [] [] [] [] [] [] [] [] [] [] []    Supine to Sit [] [] [x] [] [] [] [] [] [] [] []    Scooting [] [] [] [] [] [] [] [] [] [] []    Sit to Supine [] [] [x] [] [] [] [] [] [] [] []    Transfers    Sit to Stand [] [] [x] [] [] [] [] [] [] [] []    Bed to Chair [] [] [] [] [] [] [] [] [] [] []    Stand to Sit [] [] [x] [] [] [] [] [] [] [] []    I=Independent, Mod I=Modified Independent, S=Supervision, SBA=Standby Assistance, CGA=Contact Guard Assistance,   Min=Minimal Assistance, Mod=Moderate Assistance, Max=Maximal Assistance, Total=Total Assistance, NT=Not Tested    BALANCE: Good Fair+ Fair Fair- Poor NT Comments   Sitting Static [x] [] [] [] [] []    Sitting Dynamic [] [x] [] [] [] []              Standing Static [] [x] [] [] [] []    Standing Dynamic [] [x] [] [] [] []      GAIT: I Mod I S SBA CGA Min Mod Max Total  NT x2 Comments:   Level of Assistance [] [] [] [] [] [] [] [] [] [] []    Distance 1000' +    DME Gait Belt    Gait Quality Short steps, decreased arm swing    Weightbearing  Status N/A     I=Independent, Mod I=Modified Independent, S=Supervision, SBA=Standby Assistance, CGA=Contact Guard Assistance,   Min=Minimal Assistance, Mod=Moderate Assistance, Max=Maximal Assistance, Total=Total Assistance, NT=Not Tested    PLAN:   FREQUENCY/DURATION: PT Plan of Care: 3 times/week for duration of hospital stay or until stated goals are met, whichever comes first.  TREATMENT:     TREATMENT:   ($$ Gait Trainin-52 mins    )  Therapeutic Activity (40 Minutes):  Therapeutic activity included Supine to Sit, Sit to Supine, Ambulation on level ground, Sitting balance  and Standing balance to improve functional Mobility, Strength and Activity tolerance.     TREATMENT GRID:   Date:  8/31 Date:   Date:     Activity/Exercise Seated Parameters Parameters Parameters   Heel raises X 15 B      Toe raises X 15 B     LAQ's X 15 B     Hip Flex X 15 B     Hip ABD                            AFTER TREATMENT POSITION/PRECAUTIONS:  Needs within reach and EOB    INTERDISCIPLINARY COLLABORATION:  RN/PCT and PT/PTA    TOTAL TREATMENT DURATION:  PT Patient Time In/Time Out  Time In: 0940  Time Out: [address] [facility] SendyKRISTOPHER

## 2021-09-02 NOTE — PROGRESS NOTES
CC: rectal drainage and abscess    HPI: 67 yo with hx of multiple abdominal procedures, LAR with persistent mucoid discharge from the residual anal stump that has become significantly debilitating. No new issues. Tolerating diet. Has been up and walking with assistance and walker.      Past Medical History:   Diagnosis Date    Acute deep vein thrombosis (DVT) of non-extremity vein 5/20/2019    Family history of malignant neoplasm of gastrointestinal tract     mother colon/ovarian cancer 67    Fecal incontinence     LAR syndrome    Former cigarette smoker     History of rectal cancer     Hypothyroid     stable w/med    Infection and inflammatory reaction due to other internal prosthetic devices, implants and grafts, subsequent encounter 2017    abd wound/mesh colostomy    Insomnia     takes meds    Major depression     Osteoarthritis, hand     Personal history of colonic polyps 9/2013    x 1    Personal history of malignant neoplasm of rectum, rectosigmoid junction, and anus 9/2013    Psychiatric disorder     anxiety     Past Surgical History:   Procedure Laterality Date    COLONOSCOPY N/A 2/12/2018    COLONOSCOPY / BMI=21 performed by Katty Modi MD at [facility] N/A 3/5/2021    COLONOSCOPY/BMI 19 performed by Niecy Meza MD at [address]  2/12/2018         COLONOSCOPY THRU STOMA,LESMADY RMVL W/SNARE  3/5/2021         ENDOSCOPY, COLON, DIAGNOSTIC  last 2/3/15    Emily--no polyps--3 year recall    HX COLOSTOMY  5/11/16    HX GI  4/2016    Sacral nerve stimlator lead trial (unsucessful) and removal    HX HERNIA REPAIR  3/2015, 5/2016    HX HERNIA REPAIR      and Colostomy in May    HX OTHER SURGICAL  10/7/2013    Emily---endorectal us    HX OTHER SURGICAL Bilateral     cataracts  2017    HX POLYPECTOMY      HX TOTAL COLECTOMY  11/2013    Emily--lap LAR with coloproctostomy, mobilization splenic flexure, diverting loop ileostomy    HX TOTAL COLECTOMY Right 8/2014    for leak    HX VASCULAR ACCESS Left 4/14    single lumen port/subsequently removed    IR CHANGE ABSCESS / CYST CATHETER  5/27/2021    IR CHANGE ABSCESS / CYST CATHETER  6/14/2021    IR CHANGE ABSCESS / CYST CATHETER  7/8/2021    IR CHANGE ABSCESS / CYST CATHETER  8/5/2021    IR INJ ABS CYST VIA CATHETER / TUBE  5/20/2021    IR INJ ABS CYST VIA CATHETER / TUBE  6/4/2021    IR INSERT TUNL CVC W/O PORT OVER 5 YR  5/20/2019    IR REPLACE CVC TUNNELED W/O PORT  5/30/2019     A&O x3  Resp unlabored  Abd soft. Ostomy bag in place. Drains x 2 serosang. Brito straw. Closure at the perineum and right thigh intact. A/P    Continue drains. Continue rbito. Continue xeroform and gauze to perineal and right thigh incisions. Will follow in office next week if discharged.

## 2021-09-02 NOTE — PROGRESS NOTES
Problem: Falls - Risk of  Goal: *Absence of Falls  Description: Document Makeda Mckeon Fall Risk and appropriate interventions in the flowsheet. Outcome: Progressing Towards Goal  Note: Fall Risk Interventions:  Mobility Interventions: Communicate number of staff needed for ambulation/transfer, Patient to call before getting OOB, Utilize walker, cane, or other assistive device         Medication Interventions: Patient to call before getting OOB, Teach patient to arise slowly    Elimination Interventions: Call light in reach, Patient to call for help with toileting needs    History of Falls Interventions: Consult care management for discharge planning, Investigate reason for fall, Room close to nurse's station         Problem: Patient Education: Go to Patient Education Activity  Goal: Patient/Family Education  Outcome: Progressing Towards Goal     Problem: Infection - Risk of, Surgical Site Infection  Goal: *Absence of surgical site infection signs and symptoms  Outcome: Progressing Towards Goal     Problem: Patient Education: Go to Patient Education Activity  Goal: Patient/Family Education  Outcome: Progressing Towards Goal     Problem: Pressure Injury - Risk of  Goal: *Prevention of pressure injury  Description: Document Josef Scale and appropriate interventions in the flowsheet.   Outcome: Progressing Towards Goal  Note: Pressure Injury Interventions:       Moisture Interventions: Absorbent underpads, Internal/External fecal devices, Internal/External urinary devices    Activity Interventions: Increase time out of bed, Pressure redistribution bed/mattress(bed type), PT/OT evaluation    Mobility Interventions: Pressure redistribution bed/mattress (bed type), PT/OT evaluation    Nutrition Interventions: Document food/fluid/supplement intake, Offer support with meals,snacks and hydration    Friction and Shear Interventions: Lift sheet, Minimize layers                Problem: Patient Education: Go to Patient Education Activity  Goal: Patient/Family Education  Outcome: Progressing Towards Goal     Problem: Surgical Pathway Day of Surgery  Goal: Activity/Safety  Outcome: Progressing Towards Goal  Goal: Consults, if ordered  Outcome: Progressing Towards Goal  Goal: Nutrition/Diet  Outcome: Progressing Towards Goal  Goal: Medications  Outcome: Progressing Towards Goal  Goal: Respiratory  Outcome: Progressing Towards Goal  Goal: Treatments/Interventions/Procedures  Outcome: Progressing Towards Goal  Goal: Psychosocial  Outcome: Progressing Towards Goal  Goal: *No signs and symptoms of infection or wound complications  Outcome: Progressing Towards Goal  Goal: *Optimal pain control at patient's stated goal  Outcome: Progressing Towards Goal  Goal: *Adequate urinary output (equal to or greater than 30 milliliters/hour)  Description: Ambulatory Surgery patients voiding without difficulty.   Outcome: Progressing Towards Goal  Goal: *Hemodynamically stable  Outcome: Progressing Towards Goal  Goal: *Tolerating diet  Outcome: Progressing Towards Goal  Goal: *Demonstrates progressive activity  Outcome: Progressing Towards Goal     Problem: Surgical Pathway Post-Op Day 1  Goal: Off Pathway (Use only if patient is Off Pathway)  Outcome: Progressing Towards Goal  Goal: Activity/Safety  Outcome: Progressing Towards Goal  Goal: Diagnostic Test/Procedures  Outcome: Progressing Towards Goal  Goal: Nutrition/Diet  Outcome: Progressing Towards Goal  Goal: Discharge Planning  Outcome: Progressing Towards Goal  Goal: Medications  Outcome: Progressing Towards Goal  Goal: Respiratory  Outcome: Progressing Towards Goal  Goal: Treatments/Interventions/Procedures  Outcome: Progressing Towards Goal  Goal: Psychosocial  Outcome: Progressing Towards Goal  Goal: *No signs and symptoms of infection or wound complications  Outcome: Progressing Towards Goal  Goal: *Optimal pain control at patient's stated goal  Outcome: Progressing Towards Goal  Goal: *Adequate urinary output (equal to or greater than 30 milliliters/hour)  Outcome: Progressing Towards Goal  Goal: *Hemodynamically stable  Outcome: Progressing Towards Goal  Goal: *Tolerating diet  Outcome: Progressing Towards Goal  Goal: *Demonstrates progressive activity  Outcome: Progressing Towards Goal  Goal: *Lungs clear or at baseline  Outcome: Progressing Towards Goal     Problem: Surgical Pathway Post-Op Day 2 through Discharge  Goal: Off Pathway (Use only if patient is Off Pathway)  Outcome: Progressing Towards Goal  Goal: Activity/Safety  Outcome: Progressing Towards Goal  Goal: Nutrition/Diet  Outcome: Progressing Towards Goal  Goal: Discharge Planning  Outcome: Progressing Towards Goal  Goal: Medications  Outcome: Progressing Towards Goal  Goal: Respiratory  Outcome: Progressing Towards Goal  Goal: Treatments/Interventions/Procedures  Outcome: Progressing Towards Goal  Goal: Psychosocial  Outcome: Progressing Towards Goal  Goal: *No signs and symptoms of infection or wound complications  Outcome: Progressing Towards Goal  Goal: *Optimal pain control at patient's stated goal  Outcome: Progressing Towards Goal  Goal: *Adequate urinary output (equal to or greater than 30 milliliters/hour)  Outcome: Progressing Towards Goal  Goal: *Hemodynamically stable  Outcome: Progressing Towards Goal  Goal: *Tolerating diet  Outcome: Progressing Towards Goal  Goal: *Demonstrates progressive activity  Outcome: Progressing Towards Goal  Goal: *Lungs clear or at baseline  Outcome: Progressing Towards Goal     Problem: Surgical Pathway: Discharge Outcomes  Goal: *Hemodynamically stable  Outcome: Progressing Towards Goal  Goal: *Lungs clear or at baseline  Outcome: Progressing Towards Goal  Goal: *Demonstrates independent activity or return to baseline  Outcome: Progressing Towards Goal  Goal: *Optimal pain control at patient's stated goal  Outcome: Progressing Towards Goal  Goal: *Verbalizes understanding and describes prescribed diet  Outcome: Progressing Towards Goal  Goal: *Tolerating diet  Outcome: Progressing Towards Goal  Goal: *Verbalizes name, dosage, time, side effects, and number of days to continue medications  Outcome: Progressing Towards Goal  Goal: *No signs and symptoms of infection or wound complications  Outcome: Progressing Towards Goal  Goal: *Anxiety reduced or absent  Outcome: Progressing Towards Goal  Goal: *Understands and describes signs and symptoms to report to providers(Stroke Metric)  Outcome: Progressing Towards Goal  Goal: *Describes follow-up/return visits to physicians  Outcome: Progressing Towards Goal  Goal: *Describes available resources and support systems  Outcome: Progressing Towards Goal     Problem: Patient Education: Go to Patient Education Activity  Goal: Patient/Family Education  Outcome: Progressing Towards Goal

## 2021-09-02 NOTE — PROGRESS NOTES
Problem: Falls - Risk of  Goal: *Absence of Falls  Description: Document Omayra Erasmo Fall Risk and appropriate interventions in the flowsheet. Outcome: Progressing Towards Goal  Note: Fall Risk Interventions:  Mobility Interventions: Communicate number of staff needed for ambulation/transfer, Patient to call before getting OOB, Utilize walker, cane, or other assistive device         Medication Interventions: Patient to call before getting OOB, Teach patient to arise slowly    Elimination Interventions: Call light in reach, Patient to call for help with toileting needs    History of Falls Interventions: Consult care management for discharge planning, Investigate reason for fall         Problem: Patient Education: Go to Patient Education Activity  Goal: Patient/Family Education  Outcome: Progressing Towards Goal     Problem: Infection - Risk of, Surgical Site Infection  Goal: *Absence of surgical site infection signs and symptoms  Outcome: Progressing Towards Goal     Problem: Patient Education: Go to Patient Education Activity  Goal: Patient/Family Education  Outcome: Progressing Towards Goal     Problem: Pressure Injury - Risk of  Goal: *Prevention of pressure injury  Description: Document Josef Scale and appropriate interventions in the flowsheet.   Outcome: Progressing Towards Goal  Note: Pressure Injury Interventions:       Moisture Interventions: Absorbent underpads, Contain wound drainage    Activity Interventions: Increase time out of bed, Pressure redistribution bed/mattress(bed type), PT/OT evaluation    Mobility Interventions: Pressure redistribution bed/mattress (bed type), PT/OT evaluation    Nutrition Interventions: Document food/fluid/supplement intake, Offer support with meals,snacks and hydration    Friction and Shear Interventions: Lift sheet, Minimize layers                Problem: Patient Education: Go to Patient Education Activity  Goal: Patient/Family Education  Outcome: Progressing Towards Goal Problem: Surgical Pathway Day of Surgery  Goal: Activity/Safety  Outcome: Progressing Towards Goal  Goal: Consults, if ordered  Outcome: Progressing Towards Goal  Goal: Nutrition/Diet  Outcome: Progressing Towards Goal  Goal: Medications  Outcome: Progressing Towards Goal  Goal: Respiratory  Outcome: Progressing Towards Goal  Goal: Treatments/Interventions/Procedures  Outcome: Progressing Towards Goal  Goal: Psychosocial  Outcome: Progressing Towards Goal  Goal: *No signs and symptoms of infection or wound complications  Outcome: Progressing Towards Goal  Goal: *Optimal pain control at patient's stated goal  Outcome: Progressing Towards Goal  Goal: *Adequate urinary output (equal to or greater than 30 milliliters/hour)  Description: Ambulatory Surgery patients voiding without difficulty.   Outcome: Progressing Towards Goal  Goal: *Hemodynamically stable  Outcome: Progressing Towards Goal  Goal: *Tolerating diet  Outcome: Progressing Towards Goal  Goal: *Demonstrates progressive activity  Outcome: Progressing Towards Goal     Problem: Surgical Pathway Post-Op Day 1  Goal: Off Pathway (Use only if patient is Off Pathway)  Outcome: Progressing Towards Goal  Goal: Activity/Safety  Outcome: Progressing Towards Goal  Goal: Diagnostic Test/Procedures  Outcome: Progressing Towards Goal  Goal: Nutrition/Diet  Outcome: Progressing Towards Goal  Goal: Discharge Planning  Outcome: Progressing Towards Goal  Goal: Medications  Outcome: Progressing Towards Goal  Goal: Respiratory  Outcome: Progressing Towards Goal  Goal: Treatments/Interventions/Procedures  Outcome: Progressing Towards Goal  Goal: Psychosocial  Outcome: Progressing Towards Goal  Goal: *No signs and symptoms of infection or wound complications  Outcome: Progressing Towards Goal  Goal: *Optimal pain control at patient's stated goal  Outcome: Progressing Towards Goal  Goal: *Adequate urinary output (equal to or greater than 30 milliliters/hour)  Outcome: Progressing Towards Goal  Goal: *Hemodynamically stable  Outcome: Progressing Towards Goal  Goal: *Tolerating diet  Outcome: Progressing Towards Goal  Goal: *Demonstrates progressive activity  Outcome: Progressing Towards Goal  Goal: *Lungs clear or at baseline  Outcome: Progressing Towards Goal     Problem: Surgical Pathway Post-Op Day 2 through Discharge  Goal: Off Pathway (Use only if patient is Off Pathway)  Outcome: Progressing Towards Goal  Goal: Activity/Safety  Outcome: Progressing Towards Goal  Goal: Nutrition/Diet  Outcome: Progressing Towards Goal  Goal: Discharge Planning  Outcome: Progressing Towards Goal  Goal: Medications  Outcome: Progressing Towards Goal  Goal: Respiratory  Outcome: Progressing Towards Goal  Goal: Treatments/Interventions/Procedures  Outcome: Progressing Towards Goal  Goal: Psychosocial  Outcome: Progressing Towards Goal  Goal: *No signs and symptoms of infection or wound complications  Outcome: Progressing Towards Goal  Goal: *Optimal pain control at patient's stated goal  Outcome: Progressing Towards Goal  Goal: *Adequate urinary output (equal to or greater than 30 milliliters/hour)  Outcome: Progressing Towards Goal  Goal: *Hemodynamically stable  Outcome: Progressing Towards Goal  Goal: *Tolerating diet  Outcome: Progressing Towards Goal  Goal: *Demonstrates progressive activity  Outcome: Progressing Towards Goal  Goal: *Lungs clear or at baseline  Outcome: Progressing Towards Goal     Problem: Surgical Pathway: Discharge Outcomes  Goal: *Hemodynamically stable  Outcome: Progressing Towards Goal  Goal: *Lungs clear or at baseline  Outcome: Progressing Towards Goal  Goal: *Demonstrates independent activity or return to baseline  Outcome: Progressing Towards Goal  Goal: *Optimal pain control at patient's stated goal  Outcome: Progressing Towards Goal  Goal: *Verbalizes understanding and describes prescribed diet  Outcome: Progressing Towards Goal  Goal: *Tolerating diet  Outcome: Progressing Towards Goal  Goal: *Verbalizes name, dosage, time, side effects, and number of days to continue medications  Outcome: Progressing Towards Goal  Goal: *No signs and symptoms of infection or wound complications  Outcome: Progressing Towards Goal  Goal: *Anxiety reduced or absent  Outcome: Progressing Towards Goal  Goal: *Understands and describes signs and symptoms to report to providers(Stroke Metric)  Outcome: Progressing Towards Goal  Goal: *Describes follow-up/return visits to physicians  Outcome: Progressing Towards Goal  Goal: *Describes available resources and support systems  Outcome: Progressing Towards Goal     Problem: Patient Education: Go to Patient Education Activity  Goal: Patient/Family Education  Outcome: Progressing Towards Goal

## 2021-09-02 NOTE — PROGRESS NOTES
Upon assessment, pt is anxious. States \"I did not know that they were closing my anus up. \" Pt states that he is shocked but okay. He requests to speak with the MD tomorrow. Pt's yony dressing had come off and this is when pt realized he was sutured. Writer placed new yony dressing and reassured pt. Pt states that although he is okay, the situation is scary for him. All needs met at this time.

## 2021-09-02 NOTE — PROGRESS NOTES
END OF SHIFT NOTE:    INTAKE/OUTPUT  09/01 0701 - 09/02 0700  In: 2174.8 [P.O.:600; I.V.:1574.8]  Out: 9637 [Urine:2800; Drains:130]  Voiding: NO  Catheter: YES. Draining. Drain:   Xavier-Schaefer Drain 08/27/21 Anterior;Right Leg (Active)   Site Assessment Clean, dry, & intact 09/02/21 0237   Dressing Status Clean, dry, & intact; New drainage 09/02/21 0237   Status Patent; Charged 09/02/21 0237   Drainage Color Serous 09/02/21 0237   Output (ml) 30 ml 09/01/21 2224       Wilhemena  #1 08/27/21 Right Abdomen (Active)   Site Assessment Clean, dry, & intact 09/02/21 0237   Dressing Status Clean, dry, & intact 09/02/21 0237   Drainage Description Serous 09/02/21 0237   Venancio Drain Airleak No 09/02/21 0237   Status Patent; Charged 09/02/21 0237   Y Connector Used No 09/02/21 0237   Output (ml) 10 ml 09/01/21 1503               Flatus: Patient does have flatus present. Air in ostomy bag. Stool:  0 occurrences. Ostomy draining but did not empty throughout entire shift. Not enough draining to need emptying per pt, despite multiple offers. Characteristics:  Stool Assessment  Stool Color:  (Pt does not want to empty ostomy, small amount of drainage)  Stool Appearance: Loose  Stool Amount: Small (not emptied at this time, pt states not enough to empty yet)  Stool Source/Status: Colostomy    Emesis: 0 occurrences. Characteristics:        VITAL SIGNS  Patient Vitals for the past 12 hrs:   Temp Pulse Resp BP SpO2   09/01/21 2335 98.4 °F (36.9 °C) 65 18 (!) 150/75 96 %   09/01/21 1831 98.3 °F (36.8 °C) 88 18 (!) 166/88 97 %       Pain Assessment  Pain Intensity 1: 0 (09/01/21 1901)  Pain Location 1: Abdomen  Pain Intervention(s) 1: Medication (see MAR)  Patient Stated Pain Goal: 0    Ambulating  Yes    Shift report to be given to oncoming nurse at the bedside.     Simba Whittington RN

## 2021-09-02 NOTE — PROGRESS NOTES
END OF SHIFT NOTE:    INTAKE/OUTPUT  09/01 0701 - 09/02 0700  In: 2174.8 [P.O.:600; I.V.:1574.8]  Out: 3130 [Urine:2800; Drains:130]  Voiding: YES  Catheter: YES  Drain:   Xavier-Schaefer Drain 08/27/21 Anterior;Right Leg (Active)   Site Assessment Clean, dry, & intact 09/02/21 1450   Dressing Status Clean, dry, & intact 09/02/21 1450   Status Patent; Charged 09/02/21 1450   Drainage Color Serous 09/02/21 0840   Output (ml) 0 ml 09/02/21 1450       Venancio Drain #1 08/27/21 Right Abdomen (Active)   Site Assessment Clean, dry, & intact 09/02/21 1450   Dressing Status Clean, dry, & intact 09/02/21 1450   Drainage Description Serous 09/02/21 0840   Venancio Drain Airleak No 09/02/21 1450   Status Patent; Charged 09/02/21 1450   Y Connector Used No 09/02/21 1450   Output (ml) 0 ml 09/02/21 1450               Flatus: Patient does have flatus present. Stool:  1 occurrences. Characteristics:  Stool Assessment  Stool Color: Brown  Stool Appearance: Soft, Loose  Stool Amount: Small  Stool Source/Status: Colostomy    Emesis: 0 occurrences. Characteristics:        VITAL SIGNS  Patient Vitals for the past 12 hrs:   Temp Pulse Resp BP SpO2   09/02/21 1851 98.2 °F (36.8 °C) 83 18 132/81 95 %   09/02/21 1558 98 °F (36.7 °C) 92 20 (!) 155/83 99 %   09/02/21 1115 98.5 °F (36.9 °C) 87 18 127/81 96 %       Pain Assessment  Pain Intensity 1: 0 (09/02/21 0900)  Pain Location 1: Abdomen  Pain Intervention(s) 1: Medication (see MAR)  Patient Stated Pain Goal: 0    Ambulating  Yes    Shift report given to oncoming nurse at the bedside.     Ibrahima Carrera RN

## 2021-09-02 NOTE — PROGRESS NOTES
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 Tolerating Clear Liquids. No nausea or vomiting. Bishop patent. AF, NAD. Pt c/o incisional pain and leg pain. 8/31/21 POD4  Pt up ambulating hallways. Tolerating FLD.  -N/V. AF, some HTN overnight. 4.2L UOP. Shea [place] with 45mL out, leg drain with 110mL out. Colostomy with 250mL otu. WBC 3.9, H/H 8.4/26. 5. Cr 0.9, K+ 3.1.     9/1/21 5 Days Post-Op no complaints. Pain controlled. AF, VSS. 1075mL from ostomy. 90mL/24h from kia and 175mL/24h from DIANE drain. 5.8L UOP. H/H 8.4/26.8    9/2/21 6 Days Post-Op awake in bed, no complaints. Pain controlled. Walked better yesterday and was able to not use a walker. Drains remain in place with 10mL from kia and 120mL from DIANE drain--both serous. No AM labs. OBJECTIVE:  Constitutional: Alert oriented cooperative patient in no acute distress; appears stated age   Visit Vitals  BP (!) 166/88   Pulse 76   Temp 98.3 °F (36.8 °C)   Resp 18   Ht 5' 8\" (1.727 m)   Wt 132 lb 9.6 oz (60.1 kg)   SpO2 94%   BMI 20.16 kg/m²     Eyes: Sclera are clear. ENMT: no external lesions gross hearing normal; no obvious neck masses, no ear or lip lesions  CV: RRR. Normal perfusion  Resp: No JVD. Breathing is  non-labored; no audible wheezing. GI: soft and non-distended, appropriately ttp, dressing c/d/i; drain serous. Colostomy with +stool output.    : Bishop in place  Musculoskeletal: No embolic signs or cyanosis. DIANE drain Right thigh - serous  Neuro: moves all 4; no focal deficits  Psychiatric: normal affect and mood      Patient Vitals for the past 24 hrs:   BP Temp Pulse Resp SpO2   09/02/21 0610 (!) 166/88 98.3 °F (36.8 °C) 76 18 94 %   09/01/21 2335 (!) 150/75 98.4 °F (36.9 °C) 65 18 96 %   09/01/21 1831 (!) 166/88 98.3 °F (36.8 °C) 88 18 97 %   09/01/21 1709 (!) 157/77  62     09/01/21 1556 (!) 183/94 97.3 °F (36.3 °C) 73 18 100 %   09/01/21 1146 138/77 98.1 °F (36.7 °C) 77 17 98 %     Labs:    Recent Labs     09/01/21  0452 08/31/21  0344 08/31/21  0344   WBC 3.5*   < > 3.9*   HGB 8.4*   < > 8.4*   *   < > 110*   NA  --   --  140   K  --   --  3.1*   CL  --   --  107   CO2  --   --  31   BUN  --   --  7*   CREA  --   --  0.90   GLU  --   --  103*    < > = values in this interval not displayed.      Lance Ramon NP

## 2021-09-03 VITALS
TEMPERATURE: 98.3 F | BODY MASS INDEX: 20.1 KG/M2 | HEART RATE: 70 BPM | DIASTOLIC BLOOD PRESSURE: 68 MMHG | SYSTOLIC BLOOD PRESSURE: 125 MMHG | WEIGHT: 132.6 LBS | RESPIRATION RATE: 16 BRPM | OXYGEN SATURATION: 96 % | HEIGHT: 68 IN

## 2021-09-03 PROCEDURE — 74011250636 HC RX REV CODE- 250/636: Performed by: PHYSICIAN ASSISTANT

## 2021-09-03 PROCEDURE — 3331090002 HH PPS REVENUE DEBIT

## 2021-09-03 PROCEDURE — 77030008031

## 2021-09-03 PROCEDURE — 77030040831 HC BAG URINE DRNG MDII -A

## 2021-09-03 PROCEDURE — 2709999900 HC NON-CHARGEABLE SUPPLY

## 2021-09-03 PROCEDURE — 3331090001 HH PPS REVENUE CREDIT

## 2021-09-03 PROCEDURE — 74011250637 HC RX REV CODE- 250/637: Performed by: PHYSICIAN ASSISTANT

## 2021-09-03 PROCEDURE — 77030012865 HC BG URIN LEG MDII -A

## 2021-09-03 RX ADMIN — Medication 10 ML: at 06:45

## 2021-09-03 RX ADMIN — HEPARIN SODIUM 5000 UNITS: 5000 INJECTION INTRAVENOUS; SUBCUTANEOUS at 06:45

## 2021-09-03 RX ADMIN — FAMOTIDINE 20 MG: 20 TABLET, FILM COATED ORAL at 09:06

## 2021-09-03 RX ADMIN — LEVOTHYROXINE SODIUM 125 MCG: 0.12 TABLET ORAL at 06:45

## 2021-09-03 NOTE — DISCHARGE INSTRUCTIONS
DISCHARGE SUMMARY from Nurse    PATIENT INSTRUCTIONS:    After general anesthesia or intravenous sedation, for 24 hours or while taking prescription Narcotics:  · Limit your activities  · Do not drive and operate hazardous machinery  · Do not make important personal or business decisions  · Do  not drink alcoholic beverages  · If you have not urinated within 8 hours after discharge, please contact your surgeon on call. Report the following to your surgeon:  · Excessive pain, swelling, redness or odor of or around the surgical area  · Temperature over 100.5  · Nausea and vomiting lasting longer than 4 hours or if unable to take medications  · Any signs of decreased circulation or nerve impairment to extremity: change in color, persistent  numbness, tingling, coldness or increase pain  · Any questions    What to do at Home:  Recommended activity: Activity as tolerated, no heavy lifting. If you experience any of the following symptoms fever greater than 100.5, nausea/vomiting lasting longer than 4 hours and not relieved by medications, new onset of pain/swelling/redness/drainage/odor coming from surgical sites, please follow up with Dr. Estevan Tejeda. *  Please give a list of your current medications to your Primary Care Provider. *  Please update this list whenever your medications are discontinued, doses are      changed, or new medications (including over-the-counter products) are added. *  Please carry medication information at all times in case of emergency situations. These are general instructions for a healthy lifestyle:    No smoking/ No tobacco products/ Avoid exposure to second hand smoke  Surgeon General's Warning:  Quitting smoking now greatly reduces serious risk to your health.     Obesity, smoking, and sedentary lifestyle greatly increases your risk for illness    A healthy diet, regular physical exercise & weight monitoring are important for maintaining a healthy lifestyle    You may be retaining fluid if you have a history of heart failure or if you experience any of the following symptoms:  Weight gain of 3 pounds or more overnight or 5 pounds in a week, increased swelling in our hands or feet or shortness of breath while lying flat in bed. Please call your doctor as soon as you notice any of these symptoms; do not wait until your next office visit. The discharge information has been reviewed with the patient. The patient verbalized understanding. Discharge medications reviewed with the patient and appropriate educational materials and side effects teaching were provided. Patient Education        Voiding Cystourethrogram (VCUG): Before Your Procedure  What is a voiding cystourethrogram?     A voiding cystourethrogram (VCUG) is a test that is done to see if there are problems with the urinary system. The test uses contrast fluid and X-rays so the doctor can see how the bladder fills and drains. It is done by a doctor called a radiologist. A person who is trained to take X-rays, called an X-ray technician, may help the doctor during the test.  Your radiologist and technician will guide you through each step. For this test, a thin, flexible plastic tube called a catheter will be placed through your urethra into your bladder. Your bladder will be filled with a contrast liquid given through the catheter. X-rays will be taken while your bladder fills and then empties. The catheter will slip out or be removed after your bladder is empty. You will be awake for the test. You may find it uncomfortable when the catheter is put in. Your bladder may feel very full. And you may have an urge to urinate when the contrast liquid fills your bladder. If you start to feel anxious at any time during this test, try taking deep, slow breaths. You will be able to go home right after the test. You can go back to your usual activities right away.  You may need to urinate more often for a few days after the test. You may also notice some burning during and after you urinate. This usually goes away after 1 or 2 days. Drink lots of fluids to help relieve the burning. This also helps prevent a urinary infection. Follow-up care is a key part of your treatment and safety. Be sure to make and go to all appointments, and call your doctor if you are having problems. It's also a good idea to know your test results and keep a list of the medicines you take. How do you prepare for the test?  Having a test can be stressful. This information will help you understand what you can expect. And it will help you safely prepare for your test.  Preparing for the test  · Tell your doctor if you are allergic to iodine. Iodine is usually used in the contrast material that the doctor will put in your bladder. · Tell your doctor if you are or might be pregnant. Your doctor may not do the test if you are pregnant. That's because the X-rays could harm an unborn baby. · Understand exactly what procedure is planned, along with the risks, benefits, and other options. · Tell your doctor ALL the medicines, vitamins, supplements, and herbal remedies you take. Some may increase the risk of problems during your procedure. Your doctor will tell you if you should stop taking any of them before the procedure and how soon to do it. · If you take aspirin or some other blood thinner, ask your doctor if you should stop taking it before your procedure. Make sure that you understand exactly what your doctor wants you to do. These medicines increase the risk of bleeding. What happens on the day of the test?    · Take off all jewelry and piercings. And take out contact lenses, if you wear them. At the hospital or surgery center   · Bring a picture ID.     · The test will take about 30 to 60 minutes. When should you call your doctor?    · You have questions or concerns.     · You don't understand how to prepare for your procedure.     · You become ill before the procedure (such as fever, flu, or a cold).     · You need to reschedule or have changed your mind about having the procedure. Where can you learn more? Go to http://www.gray.com/  Enter V282 in the search box to learn more about \"Voiding Cystourethrogram (VCUG): Before Your Procedure. \"  Current as of: June 29, 2020               Content Version: 12.8  © 2006-2021 [employer]. Care instructions adapted under license by [employer] (which disclaims liability or warranty for this information). If you have questions about a medical condition or this instruction, always ask your healthcare professional. [place] [address] any warranty or liability for your use of this information. Patient Education   Patient Education   Patient Education   Patient Education        Skin Grafts: What to Expect at Home  Your Recovery  Skin grafts are thin sheets of healthy skin removed from one part of the body (donor site) and put on another part. Grafts can be used to treat skin damaged by burns, infection, or other injury. If possible, the doctor takes healthy skin from areas that are usually covered by clothes or are not easily seen. You will have a bandage over the skin graft. The area may be sore for 1 to 2 weeks. Keep the area of the skin graft dry while it heals, unless your doctor gives you other instructions. If possible, prop up the area of your body that has the skin graft. Keeping it raised will reduce swelling and fluid buildup that can cause problems with the graft. You also will have a bandage on the donor site. Try to avoid getting sunlight on the skin graft for several months. This helps to prevent a permanent change of color in the grafted skin. And for at least 3 weeks after surgery, avoid exercise that stretches the skin graft, unless your doctor gives you other instructions.   If the graft was placed on your legs, arms, hands, or feet, you may need physical therapy to prevent scar tissue from limiting your movement. This therapy is very important. It may involve wearing splints and doing stretches and range-of-motion exercises. These may be painful, but they help you to heal properly. It may take months for you to regain some feeling in the grafted area. The feeling will be different than it was before your injury. You may not have sweat glands in the skin graft area. If the grafted area is large, this may make it hard for the area to cool off when you are hot. The grafted area may not have oil glands. This can make the skin graft dry and flaky. After your graft heals, you may need to use lotion to keep the skin moist. The skin graft may not grow hair. Sometimes skin grafts do not \"take\" or survive after being transferred. If the skin graft doesn't work, you may need another graft. This care sheet gives you a general idea about how long it will take for you to recover. But each person recovers at a different rate, and certain areas of the body take longer to heal than others. Follow the steps below to feel better as quickly as possible. How can you care for yourself at home? Activity    · Rest when you feel tired. Getting enough sleep will help you recover.     · Try to walk each day, unless the grafted area is on your foot or leg. Start by walking a little more than you did the day before. Bit by bit, increase the amount you walk. Walking boosts blood flow to the skin grafts.     · Ask your doctor when you can drive again.     · Your doctor will tell you when you can return to work. It depends on the size of the skin graft, what part of your body was grafted, the type of work you do, and how you feel.     · Your doctor will tell you when you can take a shower.  Do not take a bath for the first 2 weeks, or until your doctor tells you it is okay.     · Avoid strenuous activities, such as bicycle riding, jogging, weight lifting, or aerobic exercise, until your doctor says it is okay. Diet    · You can eat your normal diet. If your stomach is upset, try bland, low-fat foods like plain rice, broiled chicken, toast, and yogurt.     · Drink plenty of fluids (unless your doctor tells you not to).     · You may notice that your bowel movements are not regular right after your surgery. This is common. Try to avoid constipation and straining with bowel movements. You may want to take a fiber supplement every day. If you have not had a bowel movement after a couple of days, ask your doctor about taking a mild laxative. Medicines    · Your doctor will tell you if and when you can restart your medicines. He or she will also give you instructions about taking any new medicines.     · If you take aspirin or some other blood thinner, ask your doctor if and when to start taking it again. Make sure that you understand exactly what your doctor wants you to do.     · Be safe with medicines. Take pain medicines exactly as directed. ? If the doctor gave you a prescription medicine for pain, take it as prescribed. ? If you are not taking a prescription pain medicine, ask your doctor if you can take an over-the-counter medicine.     · If your doctor prescribed antibiotics, take them as directed. Do not stop taking them just because you feel better. You need to take the full course of antibiotics.     · If you think your pain medicine is making you sick to your stomach:  ? Take your medicine after meals (unless your doctor has told you not to). ? Ask your doctor for a different pain medicine. Skin graft and donor site care    · Leave the bandages on the skin graft and donor site until your doctor says you can take them off. You probably will receive instructions on how to change the bandages. Follow these instructions closely.     · Keep the area clean and dry, unless your doctor tells you differently.     · Do not rub the skin graft for 3 to 4 weeks. Follow-up care is a key part of your treatment and safety. Be sure to make and go to all appointments, and call your doctor if you are having problems. It's also a good idea to know your test results and keep a list of the medicines you take. When should you call for help? Call 911 anytime you think you may need emergency care. For example, call if:    · You passed out (lost consciousness).     · You have severe trouble breathing.     · You have sudden chest pain and shortness of breath, or you cough up blood. Call your doctor now or seek immediate medical care if:    · You have pain that does not get better after you take pain medicine.     · You have loose stitches, or your skin graft comes loose.     · You have bleeding from the skin graft.     · You have symptoms of a blood clot in your leg (called a deep vein thrombosis), such as:  ? Pain in the calf, back of the knee, thigh, or groin. ? Redness and swelling in your leg or groin.     · You have signs of infection, such as:  ? Increased pain, swelling, warmth, or redness. ? Red streaks leading from the incision. ? Pus draining from the incision. ? A fever.     · You are sick to your stomach or cannot keep fluids down. Watch closely for changes in your health, and be sure to contact your doctor if:    · You do not get better as expected. Where can you learn more? Go to http://www.gray.com/  Enter V389 in the search box to learn more about \"Skin Grafts: What to Expect at Home. \"  Current as of: July 2, 2020               Content Version: 12.8  © 9255-5479 [employer]. Care instructions adapted under license by [employer] (which disclaims liability or warranty for this information). If you have questions about a medical condition or this instruction, always ask your healthcare professional. [address] any warranty or liability for your use of this information. Learning About Indwelling Urinary Catheter Care to Prevent Infection  Overview     A urinary catheter is a flexible plastic tube that's used to drain urine from your bladder when you can't urinate on your own. The catheter allows urine to drain from the bladder into a bag. Two types of drainage bags may be used with a urinary catheter. · A bedside bag is a large bag that you can hang on the side of your bed or on a chair. You can use it overnight or anytime you will be sitting or lying down for a long time. · A leg bag is a small bag that you can use during the day. It is usually attached to your thigh or calf and hidden under your clothes. Having a urinary catheter increases your risk of getting a urinary tract infection. Germs may get on the catheter and cause an infection in your bladder or kidneys. The longer you have a catheter, the more likely it is that you will get an infection. You can help prevent this problem with good hygiene and careful handling of your catheter and drainage bags. How can you help prevent infection? Take care to stay clean  · Always wash your hands well before and after you handle your catheter. · Clean the skin around the catheter daily using soap and water. Dry with a clean towel afterward. You can shower with your catheter and drainage bag in place unless your doctor told you not to. · When you clean around the catheter, check the surrounding skin for signs of infection. Look for things like pus and irritated, swollen, red, or tender skin around the catheter. Be careful with your drainage bag  · Always keep the drainage bag below the level of your bladder. This will help keep urine from flowing back into your bladder. · Check often to see that urine is flowing through the catheter into the drainage bag. · Empty the drainage bag when it is half full. This will keep it from overflowing or backing up.   · When you empty the drainage bag, do not let the tubing or drain spout touch anything. · Keep the cap that comes with the tubing, and cover the tip of the tubing when not in use. Be careful with your catheter  · Do not unhook the catheter from the drain tube until you are ready to change the tubing and bag. That could let germs get into the tube. · Make sure that the catheter tubing does not get twisted or kinked. · Do not tug or pull on the catheter. And make sure that the drainage bag does not drag or pull on the catheter. · Do not put powder or lotion on the skin around the catheter. · Talk with your doctor about your options for sexual intercourse while wearing a catheter. How do you empty the bag? If your doctor has asked you to keep a record, write down the amount of urine in the bag before you empty it. Wash your hands before and after you touch the bag. 1. Remove the drain spout from its sleeve at the bottom of the drainage bag.  2. Open the valve on the drain spout. Let the urine flow out into the toilet or a container. Be careful not to let the tubing or drain spout touch anything. 3. After you empty the bag, close the valve. Then put the drain spout back into its sleeve at the bottom of the collection bag. How do you switch to a bedside bag for overnight use? Wash your hands before and after you handle the bags. 1. Empty the leg bag that is attached to the tubing and catheter. 2. Put a clean towel under the tubing attached to the leg bag.  3. Use an alcohol wipe to clean the tip of the tubing attached to the bedside bag.  4. To stop the flow of urine, pinch the catheter with your fingers just above the tubing connection. 5. Use a twisting motion to disconnect the leg bag tubing from the catheter. 6. Then securely connect the catheter to the tubing from the bedside bag. How do you clean a bedside bag? Many people clean their bedside bag in the morning if they switch to a leg bag. To clean a bedside drainage ba.  Remove the bedside bag and attach the leg bag.  2. Fill the bedside bag with 2 parts vinegar and 3 parts water. Let it stand for 20 minutes. 3. Empty the bag, and let it air dry. When should you call for help? Call your doctor now or seek immediate medical care if:    · You have symptoms of a urinary infection. These may include:  ? Pain or burning when you urinate. ? A frequent need to urinate without being able to pass much urine. ? Pain in the flank, which is just below the rib cage and above the waist on either side of the back. ? Blood in your urine. ? A fever.     · Your urine smells bad.     · You see large blood clots in your urine.     · No urine or very little urine is flowing into the bag for 4 or more hours. Watch closely for changes in your health, and be sure to contact your doctor if:    · The area around the catheter becomes irritated, swollen, red, or tender, or there is pus draining from it.     · Urine is leaking from the place where the catheter enters your body. Follow-up care is a key part of your treatment and safety. Be sure to make and go to all appointments, and call your doctor if you are having problems. It's also a good idea to know your test results and keep a list of the medicines you take. Where can you learn more? Go to http://www.gray.com/  Enter U010 in the search box to learn more about \"Learning About Indwelling Urinary Catheter Care to Prevent Infection. \"  Current as of: June 29, 2020               Content Version: 12.8  © 2006-2021 Healthwise, [facility]. Care instructions adapted under license by [employer] (which disclaims liability or warranty for this information). If you have questions about a medical condition or this instruction, always ask your healthcare professional. [place] [address] any warranty or liability for your use of this information.             Learning About How to Care for a Person's Indwelling Urinary Catheter  Introduction     A urinary catheter is a flexible plastic tube that's used to drain urine from the bladder when a person can't urinate. The catheter is placed into the bladder by inserting it through the urethra. The urethra is the opening that carries urine from the bladder to the outside of the body. When the catheter is in the bladder, a small balloon is used to keep the catheter in place. The catheter lets urine drain from the bladder into a collection bag. Urinary catheters can be used in both men and women. A catheter that stays in place for a longer period of time is called an indwelling catheter. A catheter may be needed because of certain medical conditions. These include an enlarged prostate or problems controlling urine. It may be used after surgery on the pelvis or urinary tract. Urinary catheters are also used when the lower part of the body is paralyzed. When helping a loved one with a catheter, try to be relaxed. Caring for a catheter can be embarrassing for both of you. If you are calm and don't seem embarrassed, the person may feel more comfortable. How do you take care of the catheter? Wear disposable gloves when handling someone's catheter. Make sure to follow all of the instructions the doctor has given. And always wash your hands before and after you're done. Here are some other things to remember when caring for someone's catheter:  · Make sure that urine is running out of the catheter into the urine collection bag. And make sure that the catheter tubing does not get twisted or bent. · Keep the urine collection bag below the level of the bladder. At night it may be helpful to hang the bag on the side of the bed. · Make sure that the urine collection bag does not drag and pull on the catheter. · It's okay to shower with a catheter and urine collection bag in place, unless the doctor says not to. · Check for swelling or signs of infection in the area around the catheter.  Signs of infection include pus and irritated, swollen, red, or tender skin. · Clean the area around the catheter daily with soap and water. Dry with a clean towel afterward. · Do not apply powder or lotion to the skin around the catheter. · Do not tug or pull on the catheter. · Sexual intercourse may still be possible for people who wear a catheter. It's best to talk with a doctor about options. How do you empty the bag? The urine collection bag needs to be emptied regularly. It's best to empty the bag when it's about half full or at bedtime. If the doctor has asked you to measure the amount of urine, do that before you empty the urine into the toilet. When you are ready to empty the bag, follow these steps:  6. Put on disposable gloves. 7. Remove the drain spout from its sleeve at the bottom of the collection bag. Open the valve on the spout. 8. Let the urine flow out of the bag and into the toilet or a container. Do not let the tubing or drain spout touch anything. 9. After you empty the bag, close the valve and put the drain spout back into its sleeve. 10. Remove your gloves, and throw them away. 11. Wash your hands with soap and water. How do you care for someone after the catheter is removed? After the catheter is taken out, the person may have trouble urinating. If this happens, try helping them sit in a few inches of warm water (sitz bath). If the urge to urinate comes during the sitz bath, it may be easier for them to urinate while still in the bath. Some burning may happen the first few times the person urinates. If the burning lasts longer, it may be a sign of an infection. If the catheter causes irritation or a rash, wearing loose, cotton underwear may help. Watch closely for changes in the person's health. Be sure to contact their doctor if you notice any problems or if they are unable to urinate at all. Where can you learn more?   Go to http://www.gray.com/  Enter P984 in the search box to learn more about \"Learning About How to Care for a Person's Indwelling Urinary Catheter. \"  Current as of: July 17, 2020               Content Version: 12.8  © 2006-2021 [employer]. Care instructions adapted under license by [facility] (which disclaims liability or warranty for this information). If you have questions about a medical condition or this instruction, always ask your healthcare professional. [place] [address] any warranty or liability for your use of this information. Surgical Drain Care: Care Instructions  Your Care Instructions     After a surgery, fluid may collect inside your body in the surgical area. This makes an infection or other problems more likely. A surgical drain allows the fluid to flow out. The doctor puts a thin, flexible rubber tube into the area of your body where the fluid is likely to collect. The rubber tube carries the fluid outside your body. The most common type of surgical drain carries the fluid into a collection bulb that you empty. This is called a Xavier-Schaefer (DIANE) drain. The drain uses suction created by the bulb to pull the fluid from your body into the bulb. The rubber tube will probably be held in place by one or two stitches in your skin. The bulb will probably be attached with a safety pin to your clothes or near the bandage so that it doesn't flip around or pull on the stitches. Another type of drain is called a Penrose drain. This type of drain doesn't have a bulb. Instead, the end of the tube is open. That allows the fluid to drain onto a dressing taped to your skin. The drain may be kept in place next to your skin with a stitch or a safety pin in the tube. When you first get the drain, the fluid will be bloody. It will change color from red to pink to a light yellow or clear as the wound heals and the fluid starts to go away.   Your doctor may give you information on when you no longer need the drain and when it will be removed. Follow-up care is a key part of your treatment and safety. Be sure to make and go to all appointments, and call your doctor if you are having problems. It's also a good idea to know your test results and keep a list of the medicines you take. How do you empty the bulb of a Xavier-Schaefer drain? Follow any instructions your doctor gives you. How often you empty the bulb depends on how much fluid is draining. Empty the bulb when it is half full. 6. Wash your hands with soap and water. 7. Take the plug out of the bulb. 8. Empty the bulb. If your doctor asks you to measure the fluid, empty the fluid into a measuring cup, and write down the color and how much you collected. Your doctor will want to know this information. 9. Clean the plug with alcohol. 10. Squeeze the bulb until it is flat. This removes all the air from the bulb. You may need to put the bulb on a table or a counter to flatten it. 11. Keep the bulb flat, and put the plug in. The bulb should stay flat after you put the plug back in. This creates the suction that pulls the fluid into the bulb. 12. Empty the fluid into the toilet. 13. Wash your hands. How do you change the dressing around your surgical drain? You may have a dressing (bandage). The dressing is often made of gauze pads held on with tape. Your doctor will tell you how often to change it. 12. Wash your hands with soap and water. 13. Take off the dressing from around the drain. 14. Clean the drain site and the skin around it with soap and water. Use gauze or a cotton swab. 15. When the site is dry, put on a new dressing. The way your dressing is put on depends on what kind of drain you have. You will get instructions for your type of drain. 12. Wash your hands again with soap and water. Your doctor may ask you to keep track of your dressing changes.  Write down the time of day and the amount and color of the fluid on the dressing. How do you help prevent clogs in your surgical drain? Squeezing or \"milking\" the tube of your surgical drain can help prevent clogs so that it drains correctly. Your doctor will tell you when you need to do this. In general, you do this when:  · You see a clot in the tube that prevents fluid from draining. The clot may look like a dark, stringy lining. · You see fluid leaking around the tube where it goes into the skin. Follow these steps for milking the tube. 1. Use one hand to hold and pinch the tube where it leaves the skin. 2. With the thumb and first finger of your other hand, pinch the tube just below where you're holding it. 3. Slowly and firmly push your thumb and first finger down the tubing toward the end of the tube. 4. Repeat this as many times as needed to move the clot. If you have a Xavier-Schaefer (DIANE) drain, the clot should move down the tube and into the bulb. If you have a Penrose drain, the clot should move into the dressing. When should you call for help? Call your doctor now or seek immediate medical care if:    · You have signs of infection, such as:  ? Increased pain, swelling, warmth, or redness around the area. ? Red streaks leading from the area. ? Pus draining from the area. ? A fever.     · You see a sudden change in the color or smell of the drainage.     · The tube is coming loose where it leaves your skin. Watch closely for changes in your health, and be sure to contact your doctor if:    · You see a lot of fluid around the drain.     · You cannot remove a clot from the tube by milking the tube. Where can you learn more? Go to http://www.gray.com/  Enter K117 in the search box to learn more about \"Surgical Drain Care: Care Instructions. \"  Current as of: February 26, 2020               Content Version: 12.8  © 2006-2021 [employer].    Care instructions adapted under license by [employer] (which disclaims liability or warranty for this information). If you have questions about a medical condition or this instruction, always ask your healthcare professional. [address] any warranty or liability for your use of this information.          ___________________________________________________________________________________________________________________________________

## 2021-09-03 NOTE — PROGRESS NOTES
PLAN:  IV Abx - Zosyn--EOT 9/3/21  Stop IVFs  Pain/ nausea control  GI soft diet  Pain control  Bishop Cath - DO NOT REMOVE - DC home with leg bag  Urology follow up scheduled with Talisha Zhang NP  SCD  IS  I&O  SQ Heparin started 8/30  Drain x 2 - DC abdominal drain  Will go home with thigh drain  DC home today  Will arrange cystogram Monday (POD 10) and urology arranging fu for removal and treatment of anticipated retention/voiding difficulties. 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 Tolerating Clear Liquids. No nausea or vomiting. Bishop patent. AF, NAD. Pt c/o incisional pain and leg pain. 8/31/21 POD4  Pt up ambulating hallways. Tolerating FLD.  -N/V. AF, some HTN overnight. 4.2L UOP. Cristóbal Catarino with 45mL out, leg drain with 110mL out. Colostomy with 250mL otu. WBC 3.9, H/H 8.4/26. 5. Cr 0.9, K+ 3.1.     9/1/21 5 Days Post-Op no complaints. Pain controlled. AF, VSS. 1075mL from ostomy. 90mL/24h from kia and 175mL/24h from DIANE drain. 5.8L UOP. H/H 8.4/26.8    9/2/21 6 Days Post-Op awake in bed, no complaints. Pain controlled. Walked better yesterday and was able to not use a walker. Drains remain in place with 10mL from kia and 120mL from DIANE drain--both serous. No AM labs. 9/3/21 7 Days Post-Op awake in bed, no complaints. Pain controlled. Wants to go home. Drains remain in place with 15mL from kia and 80mL from DIANE drain--both serous. Bishop patent.        OBJECTIVE:  Constitutional: Alert oriented cooperative patient in no acute distress; appears stated age   Visit Vitals  /75 (BP 1 Location: Left upper arm, BP Patient Position: At rest)   Pulse 78   Temp 97.8 °F (36.6 °C)   Resp 16   Ht 5' 8\" (1.727 m) Wt 132 lb 9.6 oz (60.1 kg)   SpO2 95%   BMI 20.16 kg/m²     Eyes: Sclera are clear. ENMT: no external lesions gross hearing normal; no obvious neck masses, no ear or lip lesions  CV: RRR. Normal perfusion  Resp: No JVD. Breathing is  non-labored; no audible wheezing. GI: soft and non-distended, appropriately ttp, dressing c/d/i; drain serous. Colostomy with +stool output. : Bishop in place  Musculoskeletal: No embolic signs or cyanosis.  DIANE drain Right thigh - serous  Neuro: moves all 4; no focal deficits  Psychiatric: normal affect and mood      Patient Vitals for the past 24 hrs:   BP Temp Pulse Resp SpO2   09/03/21 0817 136/75 97.8 °F (36.6 °C) 78 16 95 %   09/03/21 0628 138/80 98.3 °F (36.8 °C) 87 18 96 %   09/03/21 0244 (!) 150/76 98 °F (36.7 °C) 84 17 94 %   09/02/21 2328 (!) 145/77 97.9 °F (36.6 °C) 80 18 96 %   09/02/21 1851 132/81 98.2 °F (36.8 °C) 83 18 95 %   09/02/21 1558 (!) 155/83 98 °F (36.7 °C) 92 20 99 %   09/02/21 1115 127/81 98.5 °F (36.9 °C) 87 18 96 %     Labs:    Recent Labs     09/01/21  0452   WBC 3.5*   HGB 8.4*   *     Herman Host, NP

## 2021-09-03 NOTE — PROGRESS NOTES
D/c paperwork reviewed with pt. All questions answered. PIV removed at this time. Bishop cath instructions reviewed with pt and pt verbalized understanding of teaching. Pt also states that he is comfortable with caring for the right thigh DIANE drain. No further questions at this time. No acute signs of distress.

## 2021-09-03 NOTE — PROGRESS NOTES
Problem: Falls - Risk of  Goal: *Absence of Falls  Description: Document Lindsay Solorzano Fall Risk and appropriate interventions in the flowsheet. Outcome: Progressing Towards Goal  Note: Fall Risk Interventions:  Mobility Interventions: Communicate number of staff needed for ambulation/transfer, Patient to call before getting OOB, Utilize walker, cane, or other assistive device         Medication Interventions: Evaluate medications/consider consulting pharmacy, Patient to call before getting OOB, Teach patient to arise slowly    Elimination Interventions: Call light in reach    History of Falls Interventions: Consult care management for discharge planning, Door open when patient unattended, Evaluate medications/consider consulting pharmacy, Investigate reason for fall, Room close to nurse's station         Problem: Patient Education: Go to Patient Education Activity  Goal: Patient/Family Education  Outcome: Progressing Towards Goal     Problem: Infection - Risk of, Surgical Site Infection  Goal: *Absence of surgical site infection signs and symptoms  Outcome: Progressing Towards Goal     Problem: Patient Education: Go to Patient Education Activity  Goal: Patient/Family Education  Outcome: Progressing Towards Goal     Problem: Pressure Injury - Risk of  Goal: *Prevention of pressure injury  Description: Document Josef Scale and appropriate interventions in the flowsheet.   Outcome: Progressing Towards Goal  Note: Pressure Injury Interventions:       Moisture Interventions: Absorbent underpads, Contain wound drainage    Activity Interventions: PT/OT evaluation, Increase time out of bed    Mobility Interventions: HOB 30 degrees or less, PT/OT evaluation    Nutrition Interventions: Document food/fluid/supplement intake, Offer support with meals,snacks and hydration    Friction and Shear Interventions: Lift sheet, Minimize layers                Problem: Patient Education: Go to Patient Education Activity  Goal: Patient/Family Education  Outcome: Progressing Towards Goal     Problem: Surgical Pathway Day of Surgery  Goal: Activity/Safety  Outcome: Progressing Towards Goal  Goal: Consults, if ordered  Outcome: Progressing Towards Goal  Goal: Nutrition/Diet  Outcome: Progressing Towards Goal  Goal: Medications  Outcome: Progressing Towards Goal  Goal: Respiratory  Outcome: Progressing Towards Goal  Goal: Treatments/Interventions/Procedures  Outcome: Progressing Towards Goal  Goal: Psychosocial  Outcome: Progressing Towards Goal  Goal: *No signs and symptoms of infection or wound complications  Outcome: Progressing Towards Goal  Goal: *Optimal pain control at patient's stated goal  Outcome: Progressing Towards Goal  Goal: *Adequate urinary output (equal to or greater than 30 milliliters/hour)  Description: Ambulatory Surgery patients voiding without difficulty.   Outcome: Progressing Towards Goal  Goal: *Hemodynamically stable  Outcome: Progressing Towards Goal  Goal: *Tolerating diet  Outcome: Progressing Towards Goal  Goal: *Demonstrates progressive activity  Outcome: Progressing Towards Goal     Problem: Surgical Pathway Post-Op Day 1  Goal: Off Pathway (Use only if patient is Off Pathway)  Outcome: Progressing Towards Goal  Goal: Activity/Safety  Outcome: Progressing Towards Goal  Goal: Diagnostic Test/Procedures  Outcome: Progressing Towards Goal  Goal: Nutrition/Diet  Outcome: Progressing Towards Goal  Goal: Discharge Planning  Outcome: Progressing Towards Goal  Goal: Medications  Outcome: Progressing Towards Goal  Goal: Respiratory  Outcome: Progressing Towards Goal  Goal: Treatments/Interventions/Procedures  Outcome: Progressing Towards Goal  Goal: Psychosocial  Outcome: Progressing Towards Goal  Goal: *No signs and symptoms of infection or wound complications  Outcome: Progressing Towards Goal  Goal: *Optimal pain control at patient's stated goal  Outcome: Progressing Towards Goal  Goal: *Adequate urinary output (equal to or greater than 30 milliliters/hour)  Outcome: Progressing Towards Goal  Goal: *Hemodynamically stable  Outcome: Progressing Towards Goal  Goal: *Tolerating diet  Outcome: Progressing Towards Goal  Goal: *Demonstrates progressive activity  Outcome: Progressing Towards Goal  Goal: *Lungs clear or at baseline  Outcome: Progressing Towards Goal     Problem: Surgical Pathway Post-Op Day 2 through Discharge  Goal: Off Pathway (Use only if patient is Off Pathway)  Outcome: Progressing Towards Goal  Goal: Activity/Safety  Outcome: Progressing Towards Goal  Goal: Nutrition/Diet  Outcome: Progressing Towards Goal  Goal: Discharge Planning  Outcome: Progressing Towards Goal  Goal: Medications  Outcome: Progressing Towards Goal  Goal: Respiratory  Outcome: Progressing Towards Goal  Goal: Treatments/Interventions/Procedures  Outcome: Progressing Towards Goal  Goal: Psychosocial  Outcome: Progressing Towards Goal  Goal: *No signs and symptoms of infection or wound complications  Outcome: Progressing Towards Goal  Goal: *Optimal pain control at patient's stated goal  Outcome: Progressing Towards Goal  Goal: *Adequate urinary output (equal to or greater than 30 milliliters/hour)  Outcome: Progressing Towards Goal  Goal: *Hemodynamically stable  Outcome: Progressing Towards Goal  Goal: *Tolerating diet  Outcome: Progressing Towards Goal  Goal: *Demonstrates progressive activity  Outcome: Progressing Towards Goal  Goal: *Lungs clear or at baseline  Outcome: Progressing Towards Goal     Problem: Surgical Pathway: Discharge Outcomes  Goal: *Hemodynamically stable  Outcome: Progressing Towards Goal  Goal: *Lungs clear or at baseline  Outcome: Progressing Towards Goal  Goal: *Demonstrates independent activity or return to baseline  Outcome: Progressing Towards Goal  Goal: *Optimal pain control at patient's stated goal  Outcome: Progressing Towards Goal  Goal: *Verbalizes understanding and describes prescribed diet  Outcome: Progressing Towards Goal  Goal: *Tolerating diet  Outcome: Progressing Towards Goal  Goal: *Verbalizes name, dosage, time, side effects, and number of days to continue medications  Outcome: Progressing Towards Goal  Goal: *No signs and symptoms of infection or wound complications  Outcome: Progressing Towards Goal  Goal: *Anxiety reduced or absent  Outcome: Progressing Towards Goal  Goal: *Understands and describes signs and symptoms to report to providers(Stroke Metric)  Outcome: Progressing Towards Goal  Goal: *Describes follow-up/return visits to physicians  Outcome: Progressing Towards Goal  Goal: *Describes available resources and support systems  Outcome: Progressing Towards Goal     Problem: Patient Education: Go to Patient Education Activity  Goal: Patient/Family Education  Outcome: Progressing Towards Goal

## 2021-09-03 NOTE — PROGRESS NOTES
Abd DIANE removed along with abd staples (5 staples removed and thrown into sharps container. Pt tolerated great with no issues. abd incision now covered with steri strips. No acute signs of distress at this time.

## 2021-09-03 NOTE — PROGRESS NOTES
Pt with discharge orders this day. Pt to return home with resumption of home health. Referral made to [facility] this day. No additional CM needs at discharge. Milestones met.     Care Management Interventions  Transition of Care Consult (CM Consult): Discharge Planning, [address]: Yes  Discharge Location  Discharge Placement: Home

## 2021-09-03 NOTE — DISCHARGE SUMMARY
Physician Discharge Summary     Patient ID:  Amanda Fierro  [id number]  89 y.o.  1945    Allergies: Patient has no known allergies. Admit Date: 8/27/2021    Discharge Date: 9/3/2021     HPI: Amanda Fierro is a 66 y.o. male who has a very complex surgical history presenting for completion proctectomy of his defunctionalized rectum due to persistent and uncontrolled anal discharge. Pt underwent Completion proctectomy, perineal and abdominal approach and Repair of cystotomy 8/27/21 by . * Admission Diagnoses: Rectal fistula [K60.4]  Rectal discharge [R83.1]  Other complications of colostomy ([facility] [address] [K94.09]  Incisional hernia with obstruction [K43.0]    * Discharge Diagnoses:    Hospital Problems as of 9/3/2021 Date Reviewed: 7/22/2021        Codes Class Noted - Resolved POA    H/O urinary retention ICD-10-CM: Z87.898  ICD-9-CM: V13.09  8/27/2021 - Present Unknown        * (Principal) Rectal fistula ICD-10-CM: K60.4  ICD-9-CM: 565.1  8/27/2021 - Present Unknown               Admission Condition: Stable    * Discharge Condition: stable    * Procedures: Procedure(s):  Completion proctectomy, perineal and abdominal approach Repair of cystotomy  RIGHT  GRACILIS FLAP    * Hospital Course:   Normal hospital course for this procedure. Consults: Urology    Significant Diagnostic Studies: labs and radiology    * Disposition: Home    Discharge Medications:   Current Discharge Medication List      CONTINUE these medications which have NOT CHANGED    Details   levothyroxine (SYNTHROID) 125 mcg tablet TAKE ONE TABLET BY MOUTH ONE TIME DAILY BEFORE BREAKFAST  Qty: 90 Tab, Refills: 3    Associated Diagnoses: Acquired hypothyroidism      clonazePAM (KLONOPIN) 2 mg tablet Take 2 Tabs by mouth nightly. Max Daily Amount: 4 mg. Indications: takes for sleep  Qty: 15 Tab, Refills: 0    Associated Diagnoses: Primary insomnia      amitriptyline (ELAVIL) 100 mg tablet Take 1 Tab by mouth nightly.  Dr Hao Dent psych Indications: takes for sleep  Qty: 15 Tab, Refills: 0      OLANZapine (ZYPREXA) 2.5 mg tablet Take 7.5 mg by mouth nightly. Indications: additional medications to treat depression, Takes 3 x 2.5 mg tabs      Cholecalciferol, Vitamin D3, (VITAMIN D3) 2,000 unit cap capsule Take 2,000 Units by mouth two (2) times a day. Qty: 180 Cap, Refills: 3             * Follow-up Care/Patient Instructions: Activity: Activity as tolerated  Diet: Soft  Wound Care: Keep wound clean and dry    Follow-up Information     Follow up With Specialties Details Why Contact Info    Jase Srivastava MD General Surgery On 9/14/2021 9:40 [id number] [facility] [address]      [facility], DO Internal Medicine   [address] [facility]  [place] [address] [facility] [id number]  [phone]          Discharge Instructions/Follow-up Plans:   MD Instructions:     Follow-up with Dr. Raquel Tineo in 1 week -  9/14/21 @ 9:40am  Follow up outpatient for Cystogram on Monday 9/6  Keep incisions clean and dry, may remain uncovered. Do not apply lotions, creams or ointments to incisions.     Diet - as tolerated - Soft foods diet. Activity - ambulate - as tolerated - no heavy lifting >10lb. May shower - no tub baths or soaking/submerging.     Monitor and record Right thigh drain output twice daily and each time emptied if full.      Resume other home medications.      If problems or questions arise, please call our office at [phone].     Greater than 30 minutes were spent discharging the patient          Signed:  Micah Galeazzi, NP  9/3/2021  10:43 AM

## 2021-09-04 ENCOUNTER — HOME CARE VISIT (OUTPATIENT)
Dept: SCHEDULING | Facility: HOME HEALTH | Age: 76
End: 2021-09-04
Payer: MEDICARE

## 2021-09-04 PROCEDURE — 3331090002 HH PPS REVENUE DEBIT

## 2021-09-04 PROCEDURE — G0299 HHS/HOSPICE OF RN EA 15 MIN: HCPCS

## 2021-09-04 PROCEDURE — 3331090001 HH PPS REVENUE CREDIT

## 2021-09-05 PROCEDURE — 3331090002 HH PPS REVENUE DEBIT

## 2021-09-05 PROCEDURE — 3331090001 HH PPS REVENUE CREDIT

## 2021-09-06 ENCOUNTER — HOME CARE VISIT (OUTPATIENT)
Dept: SCHEDULING | Facility: HOME HEALTH | Age: 76
End: 2021-09-06
Payer: MEDICARE

## 2021-09-06 VITALS
SYSTOLIC BLOOD PRESSURE: 120 MMHG | TEMPERATURE: 99.1 F | OXYGEN SATURATION: 98 % | HEART RATE: 91 BPM | RESPIRATION RATE: 18 BRPM | DIASTOLIC BLOOD PRESSURE: 70 MMHG

## 2021-09-06 PROCEDURE — 3331090002 HH PPS REVENUE DEBIT

## 2021-09-06 PROCEDURE — G0299 HHS/HOSPICE OF RN EA 15 MIN: HCPCS

## 2021-09-06 PROCEDURE — 3331090001 HH PPS REVENUE CREDIT

## 2021-09-06 NOTE — HOME HEALTH
Pt found to be resting comfortably in his recliner upon SN arrival for today's visit. Reports soreness around DIANE drain site, but denies actual pain. Vitals stable. Wound care provided.

## 2021-09-07 PROCEDURE — 3331090002 HH PPS REVENUE DEBIT

## 2021-09-07 PROCEDURE — 3331090001 HH PPS REVENUE CREDIT

## 2021-09-08 ENCOUNTER — HOME CARE VISIT (OUTPATIENT)
Dept: SCHEDULING | Facility: HOME HEALTH | Age: 76
End: 2021-09-08
Payer: MEDICARE

## 2021-09-08 VITALS
HEIGHT: 68 IN | DIASTOLIC BLOOD PRESSURE: 68 MMHG | HEART RATE: 97 BPM | BODY MASS INDEX: 20.16 KG/M2 | RESPIRATION RATE: 18 BRPM | SYSTOLIC BLOOD PRESSURE: 100 MMHG | TEMPERATURE: 98.7 F | OXYGEN SATURATION: 95 %

## 2021-09-08 VITALS
HEART RATE: 98 BPM | RESPIRATION RATE: 16 BRPM | SYSTOLIC BLOOD PRESSURE: 98 MMHG | DIASTOLIC BLOOD PRESSURE: 56 MMHG | TEMPERATURE: 96.5 F | OXYGEN SATURATION: 99 %

## 2021-09-08 PROCEDURE — 3331090001 HH PPS REVENUE CREDIT

## 2021-09-08 PROCEDURE — 3331090002 HH PPS REVENUE DEBIT

## 2021-09-08 PROCEDURE — G0299 HHS/HOSPICE OF RN EA 15 MIN: HCPCS

## 2021-09-09 PROCEDURE — 3331090001 HH PPS REVENUE CREDIT

## 2021-09-09 PROCEDURE — 3331090002 HH PPS REVENUE DEBIT

## 2021-09-09 NOTE — PROGRESS NOTES
Physician Progress Note      PATIENTOrval Niharika  [facility] #:                  [id number]  :                       1945  ADMIT DATE:       2021 5:36 AM  DISCH DATE:        9/3/2021 1:00 PM  RESPONDING  PROVIDER #:        Roosevelt Villalpando MD          QUERY TEXT:    Patient was admitted for repair of a rectal fistula. Progress notes stated, \"rectal abscess. \" Patient was treated with antibiotics (Zosyn). After study, was this patient under active treatment for a rectal abscess during this hospital stay? The medical record reflects the following:  Risk Factors: 66 yr, s/p repair of a rectal fistula  Clinical Indicators:  Progress notes stated, \"rectal abscess. \" Patient was treated with antibiotics (Zosyn)  Treatment: treated with antibiotics (Zosyn)  Options provided:  -- Yes  -- No  -- Other - I will add my own diagnosis  -- Disagree - Not applicable / Not valid  -- Disagree - Clinically unable to determine / Unknown  -- Refer to Clinical Documentation Reviewer    PROVIDER RESPONSE TEXT:    This patient was not under active treatment for a rectal abscess.     Query created by: Edy Eng on 2021 2:30 PM      Electronically signed by:  Roosevelt Villalpando MD 2021 8:47 PM

## 2021-09-10 ENCOUNTER — HOME CARE VISIT (OUTPATIENT)
Dept: HOME HEALTH SERVICES | Facility: HOME HEALTH | Age: 76
End: 2021-09-10
Payer: MEDICARE

## 2021-09-10 ENCOUNTER — HOME CARE VISIT (OUTPATIENT)
Dept: SCHEDULING | Facility: HOME HEALTH | Age: 76
End: 2021-09-10
Payer: MEDICARE

## 2021-09-10 PROCEDURE — 3331090002 HH PPS REVENUE DEBIT

## 2021-09-10 PROCEDURE — G0299 HHS/HOSPICE OF RN EA 15 MIN: HCPCS

## 2021-09-10 PROCEDURE — 3331090001 HH PPS REVENUE CREDIT

## 2021-09-11 PROCEDURE — 3331090002 HH PPS REVENUE DEBIT

## 2021-09-11 PROCEDURE — 3331090001 HH PPS REVENUE CREDIT

## 2021-09-12 PROCEDURE — 3331090002 HH PPS REVENUE DEBIT

## 2021-09-12 PROCEDURE — 3331090001 HH PPS REVENUE CREDIT

## 2021-09-13 ENCOUNTER — HOME CARE VISIT (OUTPATIENT)
Dept: SCHEDULING | Facility: HOME HEALTH | Age: 76
End: 2021-09-13
Payer: MEDICARE

## 2021-09-13 VITALS
DIASTOLIC BLOOD PRESSURE: 80 MMHG | HEART RATE: 84 BPM | OXYGEN SATURATION: 99 % | TEMPERATURE: 99.8 F | RESPIRATION RATE: 16 BRPM | SYSTOLIC BLOOD PRESSURE: 120 MMHG

## 2021-09-13 VITALS
RESPIRATION RATE: 16 BRPM | DIASTOLIC BLOOD PRESSURE: 60 MMHG | HEART RATE: 90 BPM | TEMPERATURE: 97.3 F | SYSTOLIC BLOOD PRESSURE: 120 MMHG | OXYGEN SATURATION: 98 %

## 2021-09-13 PROCEDURE — 3331090002 HH PPS REVENUE DEBIT

## 2021-09-13 PROCEDURE — G0299 HHS/HOSPICE OF RN EA 15 MIN: HCPCS

## 2021-09-13 PROCEDURE — 3331090001 HH PPS REVENUE CREDIT

## 2021-09-13 NOTE — PROGRESS NOTES
IR Nurse Pre-Procedure Checklist Part 2          Consent signed: Yes    H&P complete:  Not applicable    Antibiotics: Not applicable    Airway/Mallampati Done: Not applicable    Shaved: Not applicable    Pregnancy Form:Not applicable    Patient Position: Yes    MD Side: Yes     Biopsy Worksheet: Not applicable    Specimen Medium: Not applicable
Patient in room at 1630, patient alert and orient, patient transferred to table with assistance.
TRANSFER - OUT REPORT:    Verbal report given to BEATA Martin(name) on Dolores Robertson  being transferred to IR(unit) for routine progression of care       Report consisted of patients Situation, Background, Assessment and   Recommendations(SBAR). Information from the following report(s) SBAR and MAR was reviewed with the receiving nurse. Lines:   Single Lumen Venous Catheter tunnelled central line 05/30/19 Left Subclavian (Active)   Central Line Being Utilized Yes 5/30/2019  5:06 PM   Criteria for Appropriate Use Total parenteral nutrition 5/30/2019  5:06 PM   Site Assessment Clean, dry, & intact 5/30/2019  5:06 PM   Infiltration Assessment 0 5/30/2019  5:06 PM   Dressing Status Clean, dry, & intact; New 5/30/2019  5:06 PM   Dressing Type Disk with Chlorhexadine gluconate (CHG); Transparent 5/30/2019  5:06 PM        Opportunity for questions and clarification was provided.       Patient transported with:   Registered Nurse
Post-Care Instructions: I reviewed with the patient in detail post-care instructions. Patient is to wear sunprotection, and avoid picking at any of the treated lesions. Pt may apply Vaseline to crusted or scabbing areas.
Render Note In Bullet Format When Appropriate: No
Number Of Freeze-Thaw Cycles: 2 freeze-thaw cycles
Show Applicator Variable?: Yes
Duration Of Freeze Thaw-Cycle (Seconds): 0
Consent: The patient's consent was obtained including but not limited to risks of crusting, scabbing, blistering, scarring, darker or lighter pigmentary change, recurrence, incomplete removal and infection.
Detail Level: Detailed

## 2021-09-14 ENCOUNTER — HOSPITAL ENCOUNTER (EMERGENCY)
Age: 76
Discharge: HOME | DRG: 683 | End: 2021-09-14
Attending: EMERGENCY MEDICINE
Payer: MEDICARE

## 2021-09-14 VITALS
HEART RATE: 101 BPM | OXYGEN SATURATION: 96 % | SYSTOLIC BLOOD PRESSURE: 140 MMHG | TEMPERATURE: 98.2 F | RESPIRATION RATE: 18 BRPM | DIASTOLIC BLOOD PRESSURE: 77 MMHG

## 2021-09-14 DIAGNOSIS — Z51.89 VISIT FOR WOUND CHECK: Primary | ICD-10-CM

## 2021-09-14 PROCEDURE — 99282 EMERGENCY DEPT VISIT SF MDM: CPT

## 2021-09-14 PROCEDURE — 3331090001 HH PPS REVENUE CREDIT

## 2021-09-14 PROCEDURE — 3331090002 HH PPS REVENUE DEBIT

## 2021-09-14 NOTE — ED TRIAGE NOTES
Pt ambulatory unassisted to triage with mask in place. Pt states he saw general surgery today r/t having sutures and staples removed s/p rectal fistula repair. States they told him to follow up with plastics. Pt states he was dissatisfied with that answer and would like his staples to be assessed here in the ED.

## 2021-09-14 NOTE — ED PROVIDER NOTES
Patient is a 78-year-old male who had a recent complex surgery by Dr. Sidney Gomez of general surgery and Dr. Jose Alejandro Coburn of plastic surgery. He comes today asking to have sutures removed from his right medial thigh and perineum. He was in the general surgery office earlier today and was told he needed to follow-up with plastics for that removal.  His colostomy is functioning appropriately. The the wound drain is still draining appropriately he does state that the dressing came off this morning and he would like a new dressing placed. The history is provided by the patient. Wound Check   Pertinent negatives include no back pain and no neck pain.         Past Medical History:   Diagnosis Date    Acute deep vein thrombosis (DVT) of non-extremity vein 5/20/2019    Family history of malignant neoplasm of gastrointestinal tract     mother colon/ovarian cancer 67    Fecal incontinence     LAR syndrome    Former cigarette smoker     History of rectal cancer     Hypothyroid     stable w/med    Infection and inflammatory reaction due to other internal prosthetic devices, implants and grafts, subsequent encounter 2017    abd wound/mesh colostomy    Insomnia     takes meds    Major depression     Osteoarthritis, hand     Personal history of colonic polyps 9/2013    x 1    Personal history of malignant neoplasm of rectum, rectosigmoid junction, and anus 9/2013    Psychiatric disorder     anxiety       Past Surgical History:   Procedure Laterality Date    COLONOSCOPY N/A 2/12/2018    COLONOSCOPY / BMI=21 performed by [facility]ia [facility]man [facility] at [facility] ENDOSCOPY    COLONOSCOPY N/A 3/5/2021    COLONOSCOPY/BMI 19 performed by Niko Fernandez MD at [address] [place] [address]  2/12/2018         COLONOSCOPY THRU STOMA,LESMADY RMVL W/SNARE  3/5/2021         ENDOSCOPY, COLON, DIAGNOSTIC  last 2/3/15    Emily--no polyps--3 year recall    HX COLOSTOMY  5/11/16    HX GI  4/2016    Sacral nerve stimlator lead trial (unsucessful) and removal    HX HERNIA REPAIR  3/2015, 2016    HX HERNIA REPAIR      and Colostomy in May    HX OTHER SURGICAL  10/7/2013    Emily---endorectal us    HX OTHER SURGICAL Bilateral     cataracts  2017    HX POLYPECTOMY      HX TOTAL COLECTOMY  2013    Emily--lap LAR with coloproctostomy, mobilization splenic flexure, diverting loop ileostomy    HX TOTAL COLECTOMY Right 2014    for leak    HX VASCULAR ACCESS Left     single lumen port/subsequently removed    IR CHANGE ABSCESS / CYST CATHETER  2021    IR CHANGE ABSCESS / CYST CATHETER  2021    IR CHANGE ABSCESS / CYST CATHETER  2021    IR CHANGE ABSCESS / CYST CATHETER  2021    IR INJ ABS CYST VIA CATHETER / TUBE  2021    IR INJ ABS CYST VIA CATHETER / TUBE  2021    IR INSERT TUNL CVC W/O PORT OVER 5 YR  2019    IR REPLACE CVC TUNNELED W/O PORT  2019         Family History:   Problem Relation Age of Onset    Cancer Mother         colon and ovarian    Cancer Brother         prostate    Alcohol abuse Brother     Cancer Maternal Grandmother         bone    Alcohol abuse Father     Alcohol abuse Sister     Stroke Paternal Grandfather        Social History     Socioeconomic History    Marital status: SINGLE     Spouse name: Not on file    Number of children: Not on file    Years of education: Not on file    Highest education level: Not on file   Occupational History    Not on file   Tobacco Use    Smoking status: Former Smoker     Packs/day: 1.00     Years: 4.00     Pack years: 4.00     Types: Cigarettes     Quit date: 1963     Years since quittin.8    Smokeless tobacco: Never Used   Substance and Sexual Activity    Alcohol use: Not Currently     Alcohol/week: 11.7 standard drinks     Types: 14 Cans of beer per week     Comment: advised stop drinking given issues    Drug use: No    Sexual activity: Never   Other Topics Concern    Not on file   Social History Narrative    Not on file     Social Determinants of Health     Financial Resource Strain:     Difficulty of Paying Living Expenses:    Food Insecurity:     Worried About Running Out of Food in the Last Year:     [address] in the Last Year:    Transportation Needs:     Lack of Transportation (Medical):  Lack of Transportation (Non-Medical):    Physical Activity:     Days of Exercise per Week:     Minutes of Exercise per Session:    Stress:     Feeling of Stress :    Social Connections:     Frequency of Communication with Friends and Family:     Frequency of Social Gatherings with Friends and Family:     Attends Yarsani Services:     Active Member of Clubs or Organizations:     Attends Club or Organization Meetings:     Marital Status:    Intimate Partner Violence:     Fear of Current or Ex-Partner:     Emotionally Abused:     Physically Abused:     Sexually Abused: ALLERGIES: Patient has no known allergies. Review of Systems   Constitutional: Negative for chills, fatigue and fever. HENT: Negative for congestion, rhinorrhea and sore throat. Eyes: Negative for pain, discharge and visual disturbance. Respiratory: Negative for cough and shortness of breath. Cardiovascular: Negative for chest pain and palpitations. Gastrointestinal: Negative for abdominal pain, diarrhea and nausea. Endocrine: Negative for polydipsia and polyuria. Genitourinary: Negative for dysuria, frequency and urgency. Musculoskeletal: Negative for back pain and neck pain. Skin: Negative for rash. Neurological: Negative for seizures, syncope and weakness. Hematological: Negative. Vitals:    09/14/21 1140   BP: (!) 140/77   Pulse: (!) 101   Resp: 18   Temp: 98.2 °F (36.8 °C)   SpO2: 96%            Physical Exam  Vitals and nursing note reviewed. Constitutional:       Appearance: Normal appearance. HENT:      Head: Normocephalic and atraumatic.    Abdominal: Palpations: Abdomen is soft. Comments: Colostomy in left lower quadrant   Skin:     General: Skin is warm and dry. Comments: Large sutured incision on right medial thigh with a drain in place. No erythema. No bleeding. No fluctuance. Neurological:      General: No focal deficit present. Mental Status: He is alert. Cranial Nerves: No cranial nerve deficit. Sensory: No sensory deficit. Motor: No weakness. Gait: Gait normal.          MDM  Number of Diagnoses or Management Options  Diagnosis management comments: I wore appropriate PPE throughout this patient's ED visit. Aleksey Barfield MD, 11:51 AM    Records reviewed he did see general surgery in the office earlier today. Informed that he needed to follow-up with plastics surgery. Voice dictation software was used during the making of this note. This software is not perfect and grammatical and other typographical errors may be present. This note has been proofread, but may still contain errors.   Aleksey Barfield MD; 9/14/2021 @11:52 AM   ===================================================================           Amount and/or Complexity of Data Reviewed  Review and summarize past medical records: yes    Risk of Complications, Morbidity, and/or Mortality  Presenting problems: minimal  Diagnostic procedures: minimal  Management options: minimal    Patient Progress  Patient progress: stable         Procedures

## 2021-09-14 NOTE — ED NOTES
I have reviewed discharge instructions with the patient. The patient verbalized understanding. Patient left ED via Discharge Method: ambulatory to Home with self. Opportunity for questions and clarification provided. Patient given 0 scripts. To continue your aftercare when you leave the hospital, you may receive an automated call from our care team to check in on how you are doing. This is a free service and part of our promise to provide the best care and service to meet your aftercare needs.  If you have questions, or wish to unsubscribe from this service please call [phone]. Thank you for Choosing our [facility] Emergency Department.

## 2021-09-15 ENCOUNTER — APPOINTMENT (OUTPATIENT)
Dept: CT IMAGING | Age: 76
DRG: 683 | End: 2021-09-15
Attending: NURSE PRACTITIONER
Payer: MEDICARE

## 2021-09-15 ENCOUNTER — HOME CARE VISIT (OUTPATIENT)
Dept: SCHEDULING | Facility: HOME HEALTH | Age: 76
End: 2021-09-15
Payer: MEDICARE

## 2021-09-15 ENCOUNTER — APPOINTMENT (OUTPATIENT)
Dept: ULTRASOUND IMAGING | Age: 76
DRG: 683 | End: 2021-09-15
Attending: EMERGENCY MEDICINE
Payer: MEDICARE

## 2021-09-15 ENCOUNTER — HOSPITAL ENCOUNTER (INPATIENT)
Age: 76
LOS: 4 days | Discharge: HOME HEALTH | DRG: 683 | End: 2021-09-19
Attending: EMERGENCY MEDICINE | Admitting: HOSPITALIST
Payer: MEDICARE

## 2021-09-15 DIAGNOSIS — N17.9 AKI (ACUTE KIDNEY INJURY) (HCC): ICD-10-CM

## 2021-09-15 DIAGNOSIS — S37.20XD INJURY OF BLADDER, SUBSEQUENT ENCOUNTER: ICD-10-CM

## 2021-09-15 DIAGNOSIS — R31.0 GROSS HEMATURIA: ICD-10-CM

## 2021-09-15 DIAGNOSIS — N13.30 BILATERAL HYDRONEPHROSIS: ICD-10-CM

## 2021-09-15 DIAGNOSIS — R33.9 URINARY RETENTION: ICD-10-CM

## 2021-09-15 PROBLEM — E87.5 HYPERKALEMIA: Status: ACTIVE | Noted: 2021-09-15

## 2021-09-15 PROBLEM — R31.9 HEMATURIA: Status: ACTIVE | Noted: 2021-09-15

## 2021-09-15 LAB
ALBUMIN SERPL-MCNC: 3 G/DL (ref 3.2–4.6)
ALBUMIN/GLOB SERPL: 0.6 {RATIO} (ref 1.2–3.5)
ALP SERPL-CCNC: 95 U/L (ref 50–136)
ALT SERPL-CCNC: 37 U/L (ref 12–65)
ANION GAP SERPL CALC-SCNC: 13 MMOL/L (ref 7–16)
ANION GAP SERPL CALC-SCNC: 13 MMOL/L (ref 7–16)
APPEARANCE UR: ABNORMAL
AST SERPL-CCNC: 26 U/L (ref 15–37)
BACTERIA URNS QL MICRO: ABNORMAL /HPF
BASOPHILS # BLD: 0 K/UL (ref 0–0.2)
BASOPHILS NFR BLD: 0 % (ref 0–2)
BILIRUB SERPL-MCNC: 0.3 MG/DL (ref 0.2–1.1)
BILIRUB UR QL: NEGATIVE
BUN SERPL-MCNC: 78 MG/DL (ref 8–23)
BUN SERPL-MCNC: 91 MG/DL (ref 8–23)
CALCIUM SERPL-MCNC: 9 MG/DL (ref 8.3–10.4)
CALCIUM SERPL-MCNC: 9.1 MG/DL (ref 8.3–10.4)
CHLORIDE SERPL-SCNC: 103 MMOL/L (ref 98–107)
CHLORIDE SERPL-SCNC: 108 MMOL/L (ref 98–107)
CO2 SERPL-SCNC: 16 MMOL/L (ref 21–32)
CO2 SERPL-SCNC: 19 MMOL/L (ref 21–32)
COLOR UR: ABNORMAL
CREAT SERPL-MCNC: 4.45 MG/DL (ref 0.8–1.5)
CREAT SERPL-MCNC: 6.09 MG/DL (ref 0.8–1.5)
DIFFERENTIAL METHOD BLD: ABNORMAL
EOSINOPHIL # BLD: 0.1 K/UL (ref 0–0.8)
EOSINOPHIL NFR BLD: 1 % (ref 0.5–7.8)
EPI CELLS #/AREA URNS HPF: ABNORMAL /HPF
ERYTHROCYTE [DISTWIDTH] IN BLOOD BY AUTOMATED COUNT: 14.5 % (ref 11.9–14.6)
GLOBULIN SER CALC-MCNC: 4.8 G/DL (ref 2.3–3.5)
GLUCOSE SERPL-MCNC: 113 MG/DL (ref 65–100)
GLUCOSE SERPL-MCNC: 143 MG/DL (ref 65–100)
GLUCOSE UR STRIP.AUTO-MCNC: NEGATIVE MG/DL
HCT VFR BLD AUTO: 32.3 % (ref 41.1–50.3)
HGB BLD-MCNC: 10 G/DL (ref 13.6–17.2)
HGB UR QL STRIP: ABNORMAL
IMM GRANULOCYTES # BLD AUTO: 0.1 K/UL (ref 0–0.5)
IMM GRANULOCYTES NFR BLD AUTO: 0 % (ref 0–5)
INR PPP: 1
KETONES UR QL STRIP.AUTO: NEGATIVE MG/DL
LEUKOCYTE ESTERASE UR QL STRIP.AUTO: ABNORMAL
LYMPHOCYTES # BLD: 0.5 K/UL (ref 0.5–4.6)
LYMPHOCYTES NFR BLD: 4 % (ref 13–44)
MAGNESIUM SERPL-MCNC: 3.1 MG/DL (ref 1.8–2.4)
MCH RBC QN AUTO: 28.1 PG (ref 26.1–32.9)
MCHC RBC AUTO-ENTMCNC: 31 G/DL (ref 31.4–35)
MCV RBC AUTO: 90.7 FL (ref 79.6–97.8)
MONOCYTES # BLD: 1 K/UL (ref 0.1–1.3)
MONOCYTES NFR BLD: 9 % (ref 4–12)
NEUTS SEG # BLD: 9.5 K/UL (ref 1.7–8.2)
NEUTS SEG NFR BLD: 85 % (ref 43–78)
NITRITE UR QL STRIP.AUTO: NEGATIVE
NRBC # BLD: 0 K/UL (ref 0–0.2)
OTHER OBSERVATIONS,UCOM: ABNORMAL
PH UR STRIP: 6 [PH] (ref 5–9)
PLATELET # BLD AUTO: 370 K/UL (ref 150–450)
PMV BLD AUTO: 8.8 FL (ref 9.4–12.3)
POTASSIUM SERPL-SCNC: 4.7 MMOL/L (ref 3.5–5.1)
POTASSIUM SERPL-SCNC: 5.9 MMOL/L (ref 3.5–5.1)
POTASSIUM SERPL-SCNC: 6.1 MMOL/L (ref 3.5–5.1)
PROT SERPL-MCNC: 7.8 G/DL (ref 6.3–8.2)
PROT UR STRIP-MCNC: 100 MG/DL
PROTHROMBIN TIME: 13.7 SEC (ref 12.6–14.5)
RBC # BLD AUTO: 3.56 M/UL (ref 4.23–5.6)
RBC #/AREA URNS HPF: >100 /HPF
SODIUM SERPL-SCNC: 132 MMOL/L (ref 138–145)
SODIUM SERPL-SCNC: 140 MMOL/L (ref 138–145)
SP GR UR REFRACTOMETRY: 1.01 (ref 1–1.02)
UROBILINOGEN UR QL STRIP.AUTO: 0.2 EU/DL (ref 0.2–1)
WBC # BLD AUTO: 11.2 K/UL (ref 4.3–11.1)
WBC URNS QL MICRO: >100 /HPF

## 2021-09-15 PROCEDURE — 85610 PROTHROMBIN TIME: CPT

## 2021-09-15 PROCEDURE — 81001 URINALYSIS AUTO W/SCOPE: CPT

## 2021-09-15 PROCEDURE — 74011250636 HC RX REV CODE- 250/636: Performed by: EMERGENCY MEDICINE

## 2021-09-15 PROCEDURE — 96365 THER/PROPH/DIAG IV INF INIT: CPT

## 2021-09-15 PROCEDURE — 74011250636 HC RX REV CODE- 250/636: Performed by: INTERNAL MEDICINE

## 2021-09-15 PROCEDURE — 87150 DNA/RNA AMPLIFIED PROBE: CPT

## 2021-09-15 PROCEDURE — 93005 ELECTROCARDIOGRAM TRACING: CPT | Performed by: HOSPITALIST

## 2021-09-15 PROCEDURE — 87086 URINE CULTURE/COLONY COUNT: CPT

## 2021-09-15 PROCEDURE — 2709999900 HC NON-CHARGEABLE SUPPLY

## 2021-09-15 PROCEDURE — 74011250637 HC RX REV CODE- 250/637: Performed by: INTERNAL MEDICINE

## 2021-09-15 PROCEDURE — 87077 CULTURE AEROBIC IDENTIFY: CPT

## 2021-09-15 PROCEDURE — 36415 COLL VENOUS BLD VENIPUNCTURE: CPT

## 2021-09-15 PROCEDURE — 99284 EMERGENCY DEPT VISIT MOD MDM: CPT

## 2021-09-15 PROCEDURE — 74011250637 HC RX REV CODE- 250/637: Performed by: HOSPITALIST

## 2021-09-15 PROCEDURE — 65660000000 HC RM CCU STEPDOWN

## 2021-09-15 PROCEDURE — 94664 DEMO&/EVAL PT USE INHALER: CPT

## 2021-09-15 PROCEDURE — 96366 THER/PROPH/DIAG IV INF ADDON: CPT

## 2021-09-15 PROCEDURE — 72192 CT PELVIS W/O DYE: CPT

## 2021-09-15 PROCEDURE — 85025 COMPLETE CBC W/AUTO DIFF WBC: CPT

## 2021-09-15 PROCEDURE — 80053 COMPREHEN METABOLIC PANEL: CPT

## 2021-09-15 PROCEDURE — 74011000250 HC RX REV CODE- 250: Performed by: EMERGENCY MEDICINE

## 2021-09-15 PROCEDURE — 74011000636 HC RX REV CODE- 636: Performed by: HOSPITALIST

## 2021-09-15 PROCEDURE — 87186 SC STD MICRODIL/AGAR DIL: CPT

## 2021-09-15 PROCEDURE — 99222 1ST HOSP IP/OBS MODERATE 55: CPT | Performed by: NURSE PRACTITIONER

## 2021-09-15 PROCEDURE — 74176 CT ABD & PELVIS W/O CONTRAST: CPT

## 2021-09-15 PROCEDURE — 87040 BLOOD CULTURE FOR BACTERIA: CPT

## 2021-09-15 PROCEDURE — 76770 US EXAM ABDO BACK WALL COMP: CPT

## 2021-09-15 PROCEDURE — 87205 SMEAR GRAM STAIN: CPT

## 2021-09-15 PROCEDURE — 93005 ELECTROCARDIOGRAM TRACING: CPT | Performed by: EMERGENCY MEDICINE

## 2021-09-15 PROCEDURE — 84132 ASSAY OF SERUM POTASSIUM: CPT

## 2021-09-15 PROCEDURE — 87088 URINE BACTERIA CULTURE: CPT

## 2021-09-15 PROCEDURE — 94644 CONT INHLJ TX 1ST HOUR: CPT

## 2021-09-15 PROCEDURE — 74011000250 HC RX REV CODE- 250: Performed by: INTERNAL MEDICINE

## 2021-09-15 PROCEDURE — 400018 HH-NO PAY CLAIM PROCEDURE

## 2021-09-15 PROCEDURE — 96375 TX/PRO/DX INJ NEW DRUG ADDON: CPT

## 2021-09-15 PROCEDURE — 83735 ASSAY OF MAGNESIUM: CPT

## 2021-09-15 RX ORDER — SODIUM CHLORIDE 0.9 % (FLUSH) 0.9 %
5-40 SYRINGE (ML) INJECTION EVERY 8 HOURS
Status: DISCONTINUED | OUTPATIENT
Start: 2021-09-15 | End: 2021-09-19 | Stop reason: HOSPADM

## 2021-09-15 RX ORDER — HYDRALAZINE HYDROCHLORIDE 50 MG/1
50 TABLET, FILM COATED ORAL
Status: DISCONTINUED | OUTPATIENT
Start: 2021-09-15 | End: 2021-09-19 | Stop reason: HOSPADM

## 2021-09-15 RX ORDER — SODIUM CHLORIDE 9 MG/ML
125 INJECTION, SOLUTION INTRAVENOUS CONTINUOUS
Status: DISCONTINUED | OUTPATIENT
Start: 2021-09-15 | End: 2021-09-15

## 2021-09-15 RX ORDER — HEPARIN SODIUM 5000 [USP'U]/ML
5000 INJECTION, SOLUTION INTRAVENOUS; SUBCUTANEOUS EVERY 8 HOURS
Status: DISCONTINUED | OUTPATIENT
Start: 2021-09-15 | End: 2021-09-15

## 2021-09-15 RX ORDER — CALCIUM GLUCONATE 20 MG/ML
1 INJECTION, SOLUTION INTRAVENOUS ONCE
Status: COMPLETED | OUTPATIENT
Start: 2021-09-15 | End: 2021-09-15

## 2021-09-15 RX ORDER — MAGNESIUM SULFATE HEPTAHYDRATE 40 MG/ML
2 INJECTION, SOLUTION INTRAVENOUS AS NEEDED
Status: DISCONTINUED | OUTPATIENT
Start: 2021-09-15 | End: 2021-09-17

## 2021-09-15 RX ORDER — CALCIUM GLUCONATE 94 MG/ML
1 INJECTION, SOLUTION INTRAVENOUS ONCE
Status: DISCONTINUED | OUTPATIENT
Start: 2021-09-15 | End: 2021-09-15 | Stop reason: SDUPTHER

## 2021-09-15 RX ORDER — AMITRIPTYLINE HYDROCHLORIDE 50 MG/1
100 TABLET, FILM COATED ORAL
Status: DISCONTINUED | OUTPATIENT
Start: 2021-09-15 | End: 2021-09-19 | Stop reason: HOSPADM

## 2021-09-15 RX ORDER — ONDANSETRON 2 MG/ML
4 INJECTION INTRAMUSCULAR; INTRAVENOUS
Status: DISCONTINUED | OUTPATIENT
Start: 2021-09-15 | End: 2021-09-19 | Stop reason: HOSPADM

## 2021-09-15 RX ORDER — POLYETHYLENE GLYCOL 3350 17 G/17G
17 POWDER, FOR SOLUTION ORAL DAILY PRN
Status: DISCONTINUED | OUTPATIENT
Start: 2021-09-15 | End: 2021-09-19 | Stop reason: HOSPADM

## 2021-09-15 RX ORDER — ONDANSETRON 4 MG/1
4 TABLET, ORALLY DISINTEGRATING ORAL
Status: DISCONTINUED | OUTPATIENT
Start: 2021-09-15 | End: 2021-09-19 | Stop reason: HOSPADM

## 2021-09-15 RX ORDER — ALBUTEROL SULFATE 0.83 MG/ML
10 SOLUTION RESPIRATORY (INHALATION)
Status: COMPLETED | OUTPATIENT
Start: 2021-09-15 | End: 2021-09-15

## 2021-09-15 RX ORDER — ACETAMINOPHEN 650 MG/1
650 SUPPOSITORY RECTAL
Status: DISCONTINUED | OUTPATIENT
Start: 2021-09-15 | End: 2021-09-19 | Stop reason: HOSPADM

## 2021-09-15 RX ORDER — SODIUM CHLORIDE 0.9 % (FLUSH) 0.9 %
5-40 SYRINGE (ML) INJECTION AS NEEDED
Status: DISCONTINUED | OUTPATIENT
Start: 2021-09-15 | End: 2021-09-19 | Stop reason: HOSPADM

## 2021-09-15 RX ORDER — ACETAMINOPHEN 325 MG/1
650 TABLET ORAL
Status: DISCONTINUED | OUTPATIENT
Start: 2021-09-15 | End: 2021-09-19 | Stop reason: HOSPADM

## 2021-09-15 RX ORDER — LEVOTHYROXINE SODIUM 125 UG/1
125 TABLET ORAL
Status: DISCONTINUED | OUTPATIENT
Start: 2021-09-16 | End: 2021-09-19 | Stop reason: HOSPADM

## 2021-09-15 RX ORDER — FAMOTIDINE 20 MG/1
20 TABLET, FILM COATED ORAL DAILY
Status: DISCONTINUED | OUTPATIENT
Start: 2021-09-15 | End: 2021-09-19 | Stop reason: HOSPADM

## 2021-09-15 RX ORDER — CLONAZEPAM 1 MG/1
2 TABLET ORAL
Status: DISCONTINUED | OUTPATIENT
Start: 2021-09-15 | End: 2021-09-19 | Stop reason: HOSPADM

## 2021-09-15 RX ORDER — SODIUM BICARBONATE 1 MEQ/ML
50 SYRINGE (ML) INTRAVENOUS
Status: COMPLETED | OUTPATIENT
Start: 2021-09-15 | End: 2021-09-15

## 2021-09-15 RX ADMIN — Medication 10 ML: at 14:00

## 2021-09-15 RX ADMIN — CALCIUM GLUCONATE 1000 MG: 20 INJECTION, SOLUTION INTRAVENOUS at 07:20

## 2021-09-15 RX ADMIN — SODIUM BICARBONATE: 84 INJECTION, SOLUTION INTRAVENOUS at 15:58

## 2021-09-15 RX ADMIN — SODIUM BICARBONATE 50 MEQ: 84 INJECTION, SOLUTION INTRAVENOUS at 07:19

## 2021-09-15 RX ADMIN — SODIUM ZIRCONIUM CYCLOSILICATE 10 G: 10 POWDER, FOR SUSPENSION ORAL at 15:58

## 2021-09-15 RX ADMIN — OLANZAPINE 7.5 MG: 5 TABLET, FILM COATED ORAL at 21:51

## 2021-09-15 RX ADMIN — ACETAMINOPHEN 650 MG: 325 TABLET ORAL at 15:58

## 2021-09-15 RX ADMIN — FAMOTIDINE 20 MG: 20 TABLET, FILM COATED ORAL at 11:09

## 2021-09-15 RX ADMIN — AMITRIPTYLINE HYDROCHLORIDE 100 MG: 50 TABLET, FILM COATED ORAL at 21:50

## 2021-09-15 RX ADMIN — ALBUTEROL SULFATE 10 MG: 2.5 SOLUTION RESPIRATORY (INHALATION) at 09:10

## 2021-09-15 RX ADMIN — CLONAZEPAM 2 MG: 1 TABLET ORAL at 21:50

## 2021-09-15 RX ADMIN — SODIUM CHLORIDE 1000 ML: 900 INJECTION, SOLUTION INTRAVENOUS at 07:24

## 2021-09-15 RX ADMIN — IOTHALAMATE MEGLUMINE 166 ML: 172 INJECTION URETERAL at 17:48

## 2021-09-15 RX ADMIN — Medication 10 ML: at 21:54

## 2021-09-15 RX ADMIN — DIATRIZOATE MEGLUMINE AND DIATRIZOATE SODIUM 15 ML: 600; 100 SOLUTION ORAL; RECTAL at 15:45

## 2021-09-15 NOTE — PROGRESS NOTES
09/15/21 1821   Skin Integumentary   Skin Integumentary (WDL) X    Pressure  Injury Documentation No Pressure Injury Noted-Pressure Ulcer Prevention Initiated   Skin Color Appropriate for ethnicity   Skin Condition/Temp Dry;Warm;Fragile   Skin Integrity Incision (comment); Scars (comment); Wound (add Wound LDA)   Turgor Non-tenting   Hair Growth Sparce   Nails X   Varicosities Absent   Exceptions to WDL Discolored   Pt has sutures in rectum that are coming apart. Pt has scar on lower abd with steri strips, staples on left thigh with DIANE drain. Colostomy bag present.

## 2021-09-15 NOTE — CONSULTS
Urology Consult    Patient: Gil Plata MRN: [id number]  SSN: [id number]    YOB: 1945  Age: 66 y.o. Sex: male      Subjective:      Gil Plata is a 66 y.o. male with a past medical hx of kidney stones, pelvic abscess and urinary retention who had a completion proctectomy by Dr. Brittny Bourgeois. A cystotomy occurred during surgery and was repaired intra-operatively by Dr. Brittny Bourgeois. He presented for follow-up on 9/10 with cystogram performed in office showing NO bladder injury with complete healing of repair and L grade 3 vesico-ureteral reflux. In 6/2021, he had cysto for urinary retention which showed a wide open bladder outlet and findings consistent with neurogenic bladder.  He was started on CIC and now reports that he was voiding well on his own without CIC.  Prime and pull catheter removal done and prophylactic keflex give for UTI ppx given cystogram/cath removal and VUR on cystogram.   He was to follow up 2-3 weeks for symptom check or urinary retention or sooner if needed. He presents to ER today with c/o fatigue and weakness. Cn found to be 6.09 (0.9 on 8/31). Brito placed with 1L of blood tinged UOP. Per notes, RN manually irrigated brito. Renal US shows bladder collapsed with brito in place with possible sediment in bladder and bilateral hydro with R>L with bilateral renal stones.   We are consulted for urinary retention,  Bilateral hydronephrosis, gross hematuria.       Past Medical History:   Diagnosis Date    Acute deep vein thrombosis (DVT) of non-extremity vein 5/20/2019    Family history of malignant neoplasm of gastrointestinal tract     mother colon/ovarian cancer 67    Fecal incontinence     LAR syndrome    Former cigarette smoker     History of rectal cancer     Hypothyroid     stable w/med    Infection and inflammatory reaction due to other internal prosthetic devices, implants and grafts, subsequent encounter 2017    abd wound/mesh colostomy    Insomnia     takes meds    Major depression     Osteoarthritis, hand     Personal history of colonic polyps 9/2013    x 1    Personal history of malignant neoplasm of rectum, rectosigmoid junction, and anus 9/2013    Psychiatric disorder     anxiety     Past Surgical History:   Procedure Laterality Date    COLONOSCOPY N/A 2/12/2018    COLONOSCOPY / BMI=21 performed by Charlotte White MD at [facility] N/A 3/5/2021    COLONOSCOPY/BMI 19 performed by Gen Ames MD at [address] [place] [address]  2/12/2018         COLONOSCOPY THRU STOMA,LESN RMVL W/SNARE  3/5/2021         ENDOSCOPY, COLON, DIAGNOSTIC  last 2/3/15    Emily--no polyps--3 year recall    HX COLOSTOMY  5/11/16    HX GI  4/2016    Sacral nerve stimlator lead trial (unsucessful) and removal    HX HERNIA REPAIR  3/2015, 5/2016    HX HERNIA REPAIR      and Colostomy in May    HX OTHER SURGICAL  10/7/2013    Emily---endorectal us    HX OTHER SURGICAL Bilateral     cataracts  2017    HX POLYPECTOMY      HX TOTAL COLECTOMY  11/2013    Emily--lap LAR with coloproctostomy, mobilization splenic flexure, diverting loop ileostomy    HX TOTAL COLECTOMY Right 8/2014    for leak    HX VASCULAR ACCESS Left 4/14    single lumen port/subsequently removed    IR CHANGE ABSCESS / CYST CATHETER  5/27/2021    IR CHANGE ABSCESS / CYST CATHETER  6/14/2021    IR CHANGE ABSCESS / CYST CATHETER  7/8/2021    IR CHANGE ABSCESS / CYST CATHETER  8/5/2021    IR INJ ABS CYST VIA CATHETER / TUBE  5/20/2021    IR INJ ABS CYST VIA CATHETER / TUBE  6/4/2021    IR INSERT TUNL CVC W/O PORT OVER 5 YR  5/20/2019    IR REPLACE CVC TUNNELED W/O PORT  5/30/2019      Family History   Problem Relation Age of Onset    Cancer Mother         colon and ovarian    Cancer Brother         prostate    Alcohol abuse Brother     Cancer Maternal Grandmother         bone    Alcohol abuse Father     Alcohol abuse Sister     Stroke Paternal Grandfather      Social History     Tobacco Use    Smoking status: Former Smoker     Packs/day: 1.00     Years: 4.00     Pack years: 4.00     Types: Cigarettes     Quit date: 1963     Years since quittin.8    Smokeless tobacco: Never Used   Substance Use Topics    Alcohol use: Not Currently     Alcohol/week: 11.7 standard drinks     Types: 14 Cans of beer per week     Comment: advised stop drinking given issues      Prior to Admission medications    Medication Sig Start Date End Date Taking? Authorizing Provider   ibuprofen (MOTRIN) 200 mg tablet Take 200 mg by mouth every six (6) hours as needed for Pain. Provider, Historical   acetaminophen (TYLENOL) 500 mg tablet Take 500 mg by mouth every six (6) hours as needed for Pain. Provider, Historical   levothyroxine (SYNTHROID) 125 mcg tablet TAKE ONE TABLET BY MOUTH ONE TIME DAILY BEFORE BREAKFAST 12/10/20   Roger Munoz DO   clonazePAM (KLONOPIN) 2 mg tablet Take 2 Tabs by mouth nightly. Max Daily Amount: 4 mg. Indications: takes for sleep  Patient taking differently: Take 6 mg by mouth nightly. Indications: takes for sleep 19   Monica Schuler MD   amitriptyline (ELAVIL) 100 mg tablet Take 1 Tab by mouth nightly. Dr Pimentel [facility] psych  Indications: takes for sleep 19   Monica Schuler MD   OLANZapine THE PAVILIION) 2.5 mg tablet Take 7.5 mg by mouth nightly. Indications: additional medications to treat depression, Takes 3 x 2.5 mg tabs    Provider, Historical   Cholecalciferol, Vitamin D3, (VITAMIN D3) 2,000 unit cap capsule Take 2,000 Units by mouth two (2) times a day. 10/15/18   Janee PHAN DO        No Known Allergies    Review of Systems:  A comprehensive review of systems was negative except for that written in the History of Present Illness.     Objective:     Vitals:    09/15/21 0719 09/15/21 0748 09/15/21 0810 09/15/21 0915   BP: 133/76 (!) 147/69     Pulse: 85 80 80    Resp:       Temp:       SpO2: 97%  100% 95%   Weight: Height:            Physical Exam:  GENERAL: alert, frail  EYE: PERRLA  THROAT & NECK: neck supple  LUNG: clear to auscultation bilaterally  HEART: regular rate and rhythm, S1, S2   ABDOMEN: soft, non-tender, colostomy with stool, incision c/d/i  EXTREMITIES:  extremities normal, atraumatic, no cyanosis or edema  SKIN: no rash or abnormalities  NEUROLOGIC: AOx3  : brito with clear luigi urine OP    Assessment:     Hospital Problems  Date Reviewed: 9/10/2021        Codes Class Noted POA    Hyperkalemia ICD-10-CM: E87.5  ICD-9-CM: 276.7  9/15/2021 Unknown        Hematuria ICD-10-CM: R31.9  ICD-9-CM: 599.70  9/15/2021 Unknown        REBEKAH (acute kidney injury) ([facility] [address] ICD-10-CM: N17.9  ICD-9-CM: 584.9  9/15/2021 Unknown        Urinary retention ICD-10-CM: R33.9  ICD-9-CM: 788.20  9/15/2021 Unknown        Bilateral hydronephrosis ICD-10-CM: N13.30  ICD-9-CM: 093  5/11/2021 Unknown              Plan:     CT renal stone ordered to evaluate for obstruction d/t REBEKAH/hx of kidney stones with bilateral hydro R>L and gross hematuria. Pt with known NGB and now with urinary retention and this could also be the cause for REBEKAH/retention/hematuria. Will follow CT results. Attempted to irrigate pt's brito however he was in the bathroom. Urine was clear and luigi and brito seems to be draining well so will observe color for now. Hematuria likely from overdistended bladder. Check UA. Thank you for the opportunity to assist in the care of this patient.          Signed By: Kristen Starkey NP     September 15, 2021

## 2021-09-15 NOTE — CONSULTS
[facility] Nephrology Consult    Subjective:     Maureen Whatley is a 66 y.o.  male who is being seen for REBEKAH. He has a PMH with kidney stones, pelvic abscess and urinary retention. He had a recent  Proctectomy, complicated by cystotomy but was repaired intra-operatively. He had a follow up 9/10 with cystogram showing no bladder injury. In the past had been on intermittent catheterization    He came to the ED today with c/o fatigue and weakness. Lab with a creatinine of   6.09 . It was 0.9 at the end of August. After  Brito insertion , immediately 1L of blood tinged UOP. Renal US shows bladder collapsed with brito in place,  and bilateral hydro with R>L with bilateral renal stones. He has gone for renal CT and results pending. Urology is on the case. He has hyperkalemia and some acidemia. No fever, chills, sweats, epistaxis, vision changes, headache, shortness of breath, wheezing,  chest pain, palpitations. No nausea/vomitting, diarrhea or constipation. No dysuria . No paresthesia, seizure history, no arthritis/ arthralgia or skin rashes.      Past Medical History:   Diagnosis Date    Acute deep vein thrombosis (DVT) of non-extremity vein 5/20/2019    Family history of malignant neoplasm of gastrointestinal tract     mother colon/ovarian cancer 67    Fecal incontinence     LAR syndrome    Former cigarette smoker     History of rectal cancer     Hypothyroid     stable w/med    Infection and inflammatory reaction due to other internal prosthetic devices, implants and grafts, subsequent encounter 2017    abd wound/mesh colostomy    Insomnia     takes meds    Major depression     Osteoarthritis, hand     Personal history of colonic polyps 9/2013    x 1    Personal history of malignant neoplasm of rectum, rectosigmoid junction, and anus 9/2013    Psychiatric disorder     anxiety      Past Surgical History:   Procedure Laterality Date    COLONOSCOPY N/A 2/12/2018    COLONOSCOPY / BMI=21 performed by Nick Rizo MD at [facility]. N/A 3/5/2021    COLONOSCOPY/BMI 19 performed by Alex Ruiz MD at [address] [place] [address]  2018         COLONOSCOPY THRU STOMA,LILLIAN RMVL W/SNARE  3/5/2021         ENDOSCOPY, COLON, DIAGNOSTIC  last 2/3/15    Emily--no polyps--3 year recall    HX COLOSTOMY  16    HX GI  2016    Sacral nerve stimlator lead trial (unsucessful) and removal    HX HERNIA REPAIR  3/2015, 2016    HX HERNIA REPAIR      and Colostomy in May    HX OTHER SURGICAL  10/7/2013    Emily---endorectal us    HX OTHER SURGICAL Bilateral     cataracts      HX POLYPECTOMY      HX TOTAL COLECTOMY  2013    Emily--lap LAR with coloproctostomy, mobilization splenic flexure, diverting loop ileostomy    HX TOTAL COLECTOMY Right 2014    for leak    HX VASCULAR ACCESS Left     single lumen port/subsequently removed    IR CHANGE ABSCESS / CYST CATHETER  2021    IR CHANGE ABSCESS / CYST CATHETER  2021    IR CHANGE ABSCESS / CYST CATHETER  2021    IR CHANGE ABSCESS / CYST CATHETER  2021    IR INJ ABS CYST VIA CATHETER / TUBE  2021    IR INJ ABS CYST VIA CATHETER / TUBE  2021    IR INSERT TUNL CVC W/O PORT OVER 5 YR  2019    IR REPLACE CVC TUNNELED W/O PORT  2019     Family History   Problem Relation Age of Onset    Cancer Mother         colon and ovarian    Cancer Brother         prostate    Alcohol abuse Brother     Cancer Maternal Grandmother         bone    Alcohol abuse Father     Alcohol abuse Sister     Stroke Paternal Grandfather       Social History     Tobacco Use    Smoking status: Former Smoker     Packs/day: 1.00     Years: 4.00     Pack years: 4.00     Types: Cigarettes     Quit date: 1963     Years since quittin.8    Smokeless tobacco: Never Used   Substance Use Topics    Alcohol use: Not Currently     Alcohol/week: 11.7 standard drinks Types: 14 Cans of beer per week     Comment: advised stop drinking given issues       Current Facility-Administered Medications   Medication Dose Route Frequency Provider Last Rate Last Admin    sodium chloride (NS) flush 5-40 mL  5-40 mL IntraVENous Q8H Andres Prado MD        sodium chloride (NS) flush 5-40 mL  5-40 mL IntraVENous PRN Andres Prado MD        acetaminophen (TYLENOL) tablet 650 mg  650 mg Oral Q6H PRN Andres Prado MD        Or    acetaminophen (TYLENOL) suppository 650 mg  650 mg Rectal Q6H PRN Andres Prado MD        polyethylene glycol (MIRALAX) packet 17 g  17 g Oral DAILY PRN Andres Prado MD        ondansetron (ZOFRAN ODT) tablet 4 mg  4 mg Oral Q8H PRN Andres Prado MD        Or    ondansetron (ZOFRAN) injection 4 mg  4 mg IntraVENous Q6H PRN Andres Prado MD        0.9% sodium chloride infusion  125 mL/hr IntraVENous CONTINUOUS Andres Prado MD        magnesium sulfate 2 g/50 ml IVPB (premix or compounded)  2 g IntraVENous PRN Andres Prado MD        famotidine (PEPCID) tablet 20 mg  20 mg Oral DAILY Andres Prado MD   20 mg at 09/15/21 1109    amitriptyline (ELAVIL) tablet 100 mg  100 mg Oral QHS Andres Prado MD        clonazePAM (KlonoPIN) tablet 2 mg  2 mg Oral QHS Andres Prado MD       [facility] [START ON 9/16/2021] levothyroxine (SYNTHROID) tablet 125 mcg  125 mcg Oral ACB Andres Prado MD        OLANZapine (ZyPREXA) tablet 7.5 mg  7.5 mg Oral QHS Andres Prado MD         Current Outpatient Medications   Medication Sig Dispense Refill    ibuprofen (MOTRIN) 200 mg tablet Take 200 mg by mouth every six (6) hours as needed for Pain.  acetaminophen (TYLENOL) 500 mg tablet Take 500 mg by mouth every six (6) hours as needed for Pain.  levothyroxine (SYNTHROID) 125 mcg tablet TAKE ONE TABLET BY MOUTH ONE TIME DAILY BEFORE BREAKFAST 90 Tab 3    clonazePAM (KLONOPIN) 2 mg tablet Take 2 Tabs by mouth nightly. Max Daily Amount: 4 mg.  Indications: takes for sleep (Patient taking differently: Take 6 mg by mouth nightly. Indications: takes for sleep) 15 Tab 0    amitriptyline (ELAVIL) 100 mg tablet Take 1 Tab by mouth nightly. Dr Cesar Ramos psych  Indications: takes for sleep 15 Tab 0    OLANZapine (ZYPREXA) 2.5 mg tablet Take 7.5 mg by mouth nightly. Indications: additional medications to treat depression, Takes 3 x 2.5 mg tabs      Cholecalciferol, Vitamin D3, (VITAMIN D3) 2,000 unit cap capsule Take 2,000 Units by mouth two (2) times a day. 180 Cap 3        No Known Allergies     Review of Systems:  A comprehensive review of systems was negative except for that written in the History of Present Illness. Objective: Intake and Output:    09/15 0701 - 09/15 1900  In: 1050 [I.V.:1050]  Out: 1525 [Urine:1425; Drains:100]  No intake/output data recorded. Physical Exam:   General appearance: no distress, appears stated age  Head: atraumatic  Eyes: conjunctivae/corneas clear. Nose: no discharge  Throat: Lips, mucosa dry  Neck: supple, symmetrical, trachea midline and no JVD  Lungs: clear to auscultation bilaterally  Heart: regular rate and rhythm, S1, S2, no pericardial friction rub  Abdomen: soft, non-tender. Bowel sounds normal.   Extremities: No edema    Skin:  No rashes or lesions          Data Review:   Recent Results (from the past 24 hour(s))   CBC WITH AUTOMATED DIFF    Collection Time: 09/15/21  6:09 AM   Result Value Ref Range    WBC 11.2 (H) 4.3 - 11.1 K/uL    RBC 3.56 (L) 4.23 - 5.6 M/uL    HGB 10.0 (L) 13.6 - 17.2 g/dL    HCT 32.3 (L) 41.1 - 50.3 %    MCV 90.7 79.6 - 97.8 FL    MCH 28.1 26.1 - 32.9 PG    MCHC 31.0 (L) 31.4 - 35.0 g/dL    RDW 14.5 11.9 - 14.6 %    PLATELET 416 229 - 909 K/uL    MPV 8.8 (L) 9.4 - 12.3 FL    ABSOLUTE NRBC 0.00 0.0 - 0.2 K/uL    DF AUTOMATED      NEUTROPHILS 85 (H) 43 - 78 %    LYMPHOCYTES 4 (L) 13 - 44 %    MONOCYTES 9 4.0 - 12.0 %    EOSINOPHILS 1 0.5 - 7.8 %    BASOPHILS 0 0.0 - 2.0 %    IMMATURE GRANULOCYTES 0 0.0 - 5.0 %    ABS.  NEUTROPHILS 9.5 (H) 1.7 - 8.2 K/UL    ABS. LYMPHOCYTES 0.5 0.5 - 4.6 K/UL    ABS. MONOCYTES 1.0 0.1 - 1.3 K/UL    ABS. EOSINOPHILS 0.1 0.0 - 0.8 K/UL    ABS. BASOPHILS 0.0 0.0 - 0.2 K/UL    ABS. IMM. GRANS. 0.1 0.0 - 0.5 K/UL   METABOLIC PANEL, COMPREHENSIVE    Collection Time: 09/15/21  6:09 AM   Result Value Ref Range    Sodium 132 (L) 138 - 145 mmol/L    Potassium 6.1 (HH) 3.5 - 5.1 mmol/L    Chloride 103 98 - 107 mmol/L    CO2 16 (L) 21 - 32 mmol/L    Anion gap 13 7 - 16 mmol/L    Glucose 113 (H) 65 - 100 mg/dL    BUN 91 (H) 8 - 23 MG/DL    Creatinine 6.09 (H) 0.8 - 1.5 MG/DL    GFR est AA 12 (L) >60 ml/min/1.73m2    GFR est non-AA 10 (L) >60 ml/min/1.73m2    Calcium 9.0 8.3 - 10.4 MG/DL    Bilirubin, total 0.3 0.2 - 1.1 MG/DL    ALT (SGPT) 37 12 - 65 U/L    AST (SGOT) 26 15 - 37 U/L    Alk. phosphatase 95 50 - 136 U/L    Protein, total 7.8 6.3 - 8.2 g/dL    Albumin 3.0 (L) 3.2 - 4.6 g/dL    Globulin 4.8 (H) 2.3 - 3.5 g/dL    A-G Ratio 0.6 (L) 1.2 - 3.5     MAGNESIUM    Collection Time: 09/15/21  6:09 AM   Result Value Ref Range    Magnesium 3.1 (H) 1.8 - 2.4 mg/dL   POTASSIUM    Collection Time: 09/15/21  9:29 AM   Result Value Ref Range    Potassium 5.9 (H) 3.5 - 5.1 mmol/L         Assessment:     Active Problems:    Bilateral hydronephrosis (5/11/2021)      Hyperkalemia (9/15/2021)      Hematuria (9/15/2021)      REBEKAH (acute kidney injury) ([facility] [address] (9/15/2021)      Urinary retention (9/15/2021)        Plan: REBEKAH with normal baseline  Recent surgery with proctecomy  - some hx of neurogenic bladder    Time course would suggest obstructive uropathy, either stones or neurogenic bladder. Bishop in place. CT scan pending    Can change IVF to bicarb based to help with acidemia and shift some potassium. Can add lokelma as well for hyperkalemia.      Signed By: Sharita Fonseca MD     September 15, 2021

## 2021-09-15 NOTE — ED NOTES
Placed brito cath and immediate got back 1000 cc of urine, turning to charli blood and large clots which obstructed the tubing. Dr. Jason Lugo notified immediately. Irrigated bladder with 1000cc NS to clear tubing. IV bolus running at this time.

## 2021-09-15 NOTE — PROGRESS NOTES
CT AP wo contrast showed:  IMPRESSION  1. Bilateral hydronephrosis, right greater than left. Tumor/inflammatory  process in the pelvis is likely producing at least partial obstruction of the  distal ureters. 2.  Probable defect in the posterior bladder communicating with tumor/abscess in  the presacral region.     Consider follow-up pelvis CT with oral contrast and bladder contrast by Bishop. Images personally reviewed by Dr. Jason Lake, will go ahead with CT cystogram to evaluate for fistula and also CT AP with oral contrast only (unable to give IV contrast d/t kidney fxn).

## 2021-09-15 NOTE — H&P
Hospitalist History and Physical   Admit Date:  9/15/2021  7:03 AM   Name:  Justino Marroquin   Age:  66 y.o. Sex:  male  :  1945   MRN:  [id number]   Room:  [place] [address]    Presenting Complaint: Fatigue    Reason(s) for Admission: REBEKAH (acute kidney injury) ([facility] [address] [N17.9]  Hyperkalemia [E87.5]  Bilateral hydronephrosis [N13.30]  Hematuria [R31.9]     History of Present Illness:   Michael Bowser is a 66 y.o. 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 He had a follow-up on 9/10 with cystogram showing no bladder injury. Patient reports that he has not been able to urinate much but has urinary incontinence. Creatinine was normal about 15 days ago. Renal ultrasound was done in the ER and showed bilateral hydronephrosis with bilateral renal stones. Bishop catheter was placed and showed return of 1 L of urine blood-tinged. Urology consulted. Renal CT ordered. Patient denies any fever chills cough or shortness of breath. No chest pain no nausea no vomiting no diarrhea no abdominal pain. No flank pain. No dysuria urgency of urination. He reports decreased urine output. ER course    Patient is afebrile, hemodynamically stable. CBC remarkable for WBC 11.2, hemoglobin 10, platelet count 136. CMP remarkable for potassium of 6.1. Creatinine 6.09. It was 0.9 on 2021. Patient admitted for further evaluation management of acute kidney injury, bilateral hydronephrosis. Urology notified. Review of Systems:  10 systems reviewed and negative except as noted in HPI. Assessment & Plan:     Principal Problem:    REBEKAH (acute kidney injury) ([facility] [address] (9/15/2021)  Creatinine 6 on admission. Renal ultrasound shows bilateral hydronephrosis.   Bishop catheter placed in the ER.  Urology consulted. Appreciate recommendations. CT abdomen pelvis was done and showed bilateral hydronephrosis right greater than left. 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 Nephrology consulted. Appreciate recommendations. Continue hydration with IV fluids. Sodium bicarbonate drip ordered. Active Problems:    Bilateral hydronephrosis (5/11/2021)  Urology consulted. Appreciate recommendations. Bishop catheter in place. Hyperkalemia  Potassium of 6.1 on admission. Recheck was 5.9. EKG was reviewed by me and shows no ST-T wave changes suggestive of ischemia. Calcium gluconate x1 in ER. Monitor BMP. Lokelma x1 ordered. Hematuria  Urology on board. Monitor CBC. Moderate protein calorie malnutrition    Hypothyroidism  Levothyroxine    Anxiety  Continue home medications Elavil, Klonopin, olanzapine. Dispo/Discharge Planning:   Medical inpatient, telemetry. Anticipate inpatient hospital stay for at least 48 to 72 hours. Diet: DIET NPO  VTE ppx: SCDs for now given hematuria. Code status: Full Code   DPOA patient's friend Ms. Rajiv Lee phone number [phone].     Hospital Problems as of 9/15/2021 Date Reviewed: 9/10/2021        Codes Class Noted - Resolved POA    Hyperkalemia ICD-10-CM: E87.5  ICD-9-CM: 276.7  9/15/2021 - Present Unknown        Hematuria ICD-10-CM: R31.9  ICD-9-CM: 599.70  9/15/2021 - Present Unknown        * (Principal) REBEKAH (acute kidney injury) ([facility] [address] ICD-10-CM: N17.9  ICD-9-CM: 584.9  9/15/2021 - Present Unknown        Urinary retention ICD-10-CM: R33.9  ICD-9-CM: 788.20  9/15/2021 - Present Unknown        Bilateral hydronephrosis ICD-10-CM: N13.30  ICD-9-CM: 964  5/11/2021 - Present Unknown              Past Medical History:   Diagnosis Date    Acute deep vein thrombosis (DVT) of non-extremity vein 5/20/2019    Family history of malignant neoplasm of gastrointestinal tract     mother colon/ovarian cancer 67    Fecal incontinence     LAR syndrome    Former cigarette smoker     History of rectal cancer     Hypothyroid     stable w/med    Infection and inflammatory reaction due to other internal prosthetic devices, implants and grafts, subsequent encounter 2017    abd wound/mesh colostomy    Insomnia     takes meds    Major depression     Osteoarthritis, hand     Personal history of colonic polyps 9/2013    x 1    Personal history of malignant neoplasm of rectum, rectosigmoid junction, and anus 9/2013    Psychiatric disorder     anxiety     Past Surgical History:   Procedure Laterality Date    COLONOSCOPY N/A 2/12/2018    COLONOSCOPY / BMI=21 performed by Joo Alfonso MD at [place] [address]. N/A 3/5/2021    COLONOSCOPY/BMI 19 performed by Joo Noel MD at [address] [place] [address]  2/12/2018         COLONOSCOPY THRU STOMA,LILLIAN [facility] W/SNARE  3/5/2021         ENDOSCOPY, COLON, DIAGNOSTIC  last 2/3/15    Emily--no polyps--3 year recall    HX COLOSTOMY  5/11/16    HX GI  4/2016    Sacral nerve stimlator lead trial (unsucessful) and removal    HX HERNIA REPAIR  3/2015, 5/2016    HX HERNIA REPAIR      and Colostomy in May    HX OTHER SURGICAL  10/7/2013    Emily---endorectal us    HX OTHER SURGICAL Bilateral     cataracts  2017    HX POLYPECTOMY      HX TOTAL COLECTOMY  11/2013    Emily--lap LAR with coloproctostomy, mobilization splenic flexure, diverting loop ileostomy    HX TOTAL COLECTOMY Right 8/2014    for leak    HX VASCULAR ACCESS Left 4/14    single lumen port/subsequently removed    IR CHANGE ABSCESS / CYST CATHETER  5/27/2021    IR CHANGE ABSCESS / CYST CATHETER  6/14/2021    IR CHANGE ABSCESS / CYST CATHETER  7/8/2021    IR CHANGE ABSCESS / CYST CATHETER  8/5/2021    IR INJ ABS CYST VIA CATHETER / TUBE  5/20/2021    IR INJ ABS CYST VIA CATHETER / TUBE  2021    IR INSERT TUNL CVC W/O PORT OVER 5 YR  2019    IR REPLACE CVC TUNNELED W/O PORT  2019      No Known Allergies   Social History     Tobacco Use    Smoking status: Former Smoker     Packs/day: 1.00     Years: 4.00     Pack years: 4.00     Types: Cigarettes     Quit date: 1963     Years since quittin.8    Smokeless tobacco: Never Used   Substance Use Topics    Alcohol use: Not Currently     Alcohol/week: 11.7 standard drinks     Types: 14 Cans of beer per week     Comment: advised stop drinking given issues      Family History   Problem Relation Age of Onset    Cancer Mother         colon and ovarian    Cancer Brother         prostate    Alcohol abuse Brother     Cancer Maternal Grandmother         bone    Alcohol abuse Father     Alcohol abuse Sister     Stroke Paternal Grandfather       Family history reviewed and significant for colon and ovarian cancer in patient's mother, prostate cancer in patient's brother alcohol dependence in patient's brother.   Immunization History   Administered Date(s) Administered    COVID-19, PFIZER, MRNA, LNP-S, PF, 30MCG/0.3ML DOSE 02/15/2021, 2021    TB Skin Test (PPD) Intradermal 2014, 2014, 2014, 2015, 2016, 2019, 2019     PTA Medications:  Current Outpatient Medications   Medication Instructions    acetaminophen (TYLENOL) 500 mg, Oral, EVERY 6 HOURS AS NEEDED    amitriptyline (ELAVIL) 100 mg, Oral, EVERY BEDTIME, Dr Sunita Santiago psych    cholecalciferol (VITAMIN D3) 2,000 Units, Oral, 2 TIMES DAILY    clonazePAM (KLONOPIN) 4 mg, Oral, EVERY BEDTIME    ibuprofen (MOTRIN) 200 mg, Oral, EVERY 6 HOURS AS NEEDED    levothyroxine (SYNTHROID) 125 mcg tablet TAKE ONE TABLET BY MOUTH ONE TIME DAILY BEFORE BREAKFAST    OLANZapine (ZYPREXA) 7.5 mg, Oral, EVERY BEDTIME       Objective:     Patient Vitals for the past 24 hrs:   Temp Pulse Resp BP SpO2   09/15/21 1414 97.7 °F (36.5 °C)       09/15/21 1217  85  (!) 149/75    09/15/21 0915     95 %   09/15/21 0810  80   100 %   09/15/21 0748  80  (!) 147/69    09/15/21 0719  85  133/76 97 %   09/15/21 0711  89  134/74 98 %   09/15/21 0604 98 °F (36.7 °C) (!) 103 18 114/75 98 %     Oxygen Therapy  O2 Sat (%): 95 % (09/15/21 0915)  Pulse via Oximetry: 88 beats per minute (09/15/21 0915)  O2 Device: None (Room air) (09/15/21 0915)    Estimated body mass index is 19.01 kg/m² as calculated from the following:    Height as of this encounter: 5' 8\" (1.727 m). Weight as of this encounter: 56.7 kg (125 lb). Intake/Output Summary (Last 24 hours) at 9/15/2021 1424  Last data filed at 9/15/2021 1211  Gross per 24 hour   Intake 1050 ml   Output 1525 ml   Net -475 ml         Physical Exam:  General:    Elderly male, chronically ill-appearing, no overt distress  Head:  Normocephalic, atraumatic  Eyes:  Sclerae appear normal.  Pupils equally round. ENT:  Nares appear normal, no drainage. Moist oral mucosa  Neck:  No restricted ROM. Trachea midline   CV:   RRR. No m/r/g. No jugular venous distension. Lungs:   CTAB. No wheezing, rhonchi, or rales. Respirations even, unlabored  Abdomen: Bowel sounds present. Soft, nontender, nondistended, active bowel sounds, no organomegaly, ostomy in place. Extremities: No cyanosis or clubbing. No edema  Skin:     No rashes and normal coloration. Warm and dry. Neuro:  Cranial nerves II-XII grossly intact. Sensation intact  Psych:  Normal mood and affect.   Alert and oriented x3    I have reviewed ordered lab tests and independently visualized imaging below:    Last 24hr Labs:  Recent Results (from the past 24 hour(s))   CBC WITH AUTOMATED DIFF    Collection Time: 09/15/21  6:09 AM   Result Value Ref Range    WBC 11.2 (H) 4.3 - 11.1 K/uL    RBC 3.56 (L) 4.23 - 5.6 M/uL    HGB 10.0 (L) 13.6 - 17.2 g/dL    HCT 32.3 (L) 41.1 - 50.3 %    MCV 90.7 79.6 - 97.8 FL    MCH 28.1 26.1 - 32.9 PG    MCHC 31.0 (L) 31.4 - 35.0 g/dL    RDW 14.5 11.9 - 14.6 %    PLATELET 742 532 - 635 K/uL    MPV 8.8 (L) 9.4 - 12.3 FL    ABSOLUTE NRBC 0.00 0.0 - 0.2 K/uL    DF AUTOMATED      NEUTROPHILS 85 (H) 43 - 78 %    LYMPHOCYTES 4 (L) 13 - 44 %    MONOCYTES 9 4.0 - 12.0 %    EOSINOPHILS 1 0.5 - 7.8 %    BASOPHILS 0 0.0 - 2.0 %    IMMATURE GRANULOCYTES 0 0.0 - 5.0 %    ABS. NEUTROPHILS 9.5 (H) 1.7 - 8.2 K/UL    ABS. LYMPHOCYTES 0.5 0.5 - 4.6 K/UL    ABS. MONOCYTES 1.0 0.1 - 1.3 K/UL    ABS. EOSINOPHILS 0.1 0.0 - 0.8 K/UL    ABS. BASOPHILS 0.0 0.0 - 0.2 K/UL    ABS. IMM. GRANS. 0.1 0.0 - 0.5 K/UL   METABOLIC PANEL, COMPREHENSIVE    Collection Time: 09/15/21  6:09 AM   Result Value Ref Range    Sodium 132 (L) 138 - 145 mmol/L    Potassium 6.1 (HH) 3.5 - 5.1 mmol/L    Chloride 103 98 - 107 mmol/L    CO2 16 (L) 21 - 32 mmol/L    Anion gap 13 7 - 16 mmol/L    Glucose 113 (H) 65 - 100 mg/dL    BUN 91 (H) 8 - 23 MG/DL    Creatinine 6.09 (H) 0.8 - 1.5 MG/DL    GFR est AA 12 (L) >60 ml/min/1.73m2    GFR est non-AA 10 (L) >60 ml/min/1.73m2    Calcium 9.0 8.3 - 10.4 MG/DL    Bilirubin, total 0.3 0.2 - 1.1 MG/DL    ALT (SGPT) 37 12 - 65 U/L    AST (SGOT) 26 15 - 37 U/L    Alk. phosphatase 95 50 - 136 U/L    Protein, total 7.8 6.3 - 8.2 g/dL    Albumin 3.0 (L) 3.2 - 4.6 g/dL    Globulin 4.8 (H) 2.3 - 3.5 g/dL    A-G Ratio 0.6 (L) 1.2 - 3.5     MAGNESIUM    Collection Time: 09/15/21  6:09 AM   Result Value Ref Range    Magnesium 3.1 (H) 1.8 - 2.4 mg/dL   POTASSIUM    Collection Time: 09/15/21  9:29 AM   Result Value Ref Range    Potassium 5.9 (H) 3.5 - 5.1 mmol/L       All Micro Results     None          Other Studies:  US RETROPERITONEUM COMP    Result Date: 9/15/2021  INDICATION:  Acute renal insufficiency TECHNIQUE: Real-time sonography of the kidneys, retroperitoneum and bladder was performed with multiple static images obtained. FINDINGS: The right kidney measures 12.8 cm and the left kidney measures 13 cm in length.  There is bilateral hydronephrosis, right greater than left. There are small echogenic areas in both kidneys, likely stones. Largest is right lower pole. There are no significant renal masses. The aorta and IVC are normal in caliber. The urinary bladder is collapsed around a Bishop catheter. There may be some debris in the bladder. There is no ascites. 1.  Multiple kidney stones. 2.  Bilateral hydronephrosis, right greater than left. CT ABD/PEL FOR RENAL STONE    Result Date: 9/15/2021  CT ABDOMEN AND PELVIS INDICATION:  Renal failure, hydronephrosis. History of rectal cancer. Multiple axial images were obtained through the abdomen and pelvis without intravenous or oral contrast.  Radiation dose reduction techniques were used for this study: All CT scans performed at this facility use one or all of the following: Automated exposure control, adjustment of the mA and/or kVp according to patient's size, iterative reconstruction. COMPARISON: 07/23/2021 FINDINGS: - KIDNEYS/URETERS: There are several small nonobstructing stones in the right kidney. There is bilateral hydroureteronephrosis, right greater than left. Ureters are dilated into the pelvis. No definite obstructing stone is seen. Distal ureters are poorly visualized. - BLADDER: There is a Bishop catheter in place. There is high attenuation material in the bladder, probably hemorrhage. Air is also present in the bladder. There is an apparent defect in the posterior wall the bladder which communicates with a complex mass/fluid collection. Multiple bubbles of air are present which may be extraluminal. - LUNG BASES: No infiltrates or masses. - LIVER: Normal in size and appearance. - GALLBLADDER/BILE DUCTS: No gallstones or bile duct dilatation. - PANCREAS: Normal. - SPLEEN: Normal. - ADRENALS: Normal. - REPRODUCTIVE ORGANS: No pelvic masses. - BOWEL: Post resection of the distal colon. Left lower quadrant colostomy is seen.   There is no significant bowel distention. - LYMPH NODES: No significant retroperitoneal, mesenteric, or pelvic adenopathy. - BONES: No fracture or significant bone lesion. - VASCULATURE: Moderate atherosclerosis. - OTHER: No ascites. 1.  Bilateral hydronephrosis, right greater than left. Tumor/inflammatory process in the pelvis is likely producing at least partial obstruction of the distal ureters. 2.  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 Consider follow-up pelvis CT with oral contrast and bladder contrast by Bishop. ** If there are any questions about this report, I can be reached on [employer] or at [phone] **        Medications Administered     albuterol (PROVENTIL VENTOLIN) nebulizer solution 10 mg     Admin Date  09/15/2021 Action  Given Dose  10 mg Route  Nebulization Administered By  RT Ailyn          calcium gluconate 1 gram in sodium chloride (ISO-OSM) 50 mL infusion     Admin Date  09/15/2021 Action  New Bag Dose  1,000 mg Rate  50 mL/hr Route  IntraVENous Administered By  Tommas Sailors E          famotidine (PEPCID) tablet 20 mg     Admin Date  09/15/2021 Action  Given Dose  20 mg Route  Oral Administered By  Roldan Kemp          sodium bicarbonate 8.4 % (1 mEq/mL) injection 50 mEq     Admin Date  09/15/2021 Action  Given Dose  50 mEq Route  IntraVENous Administered By  Hulafrogs Sailors E          sodium chloride 0.9 % bolus infusion 1,000 mL     Admin Date  09/15/2021 Action  New Bag Dose  1,000 mL Rate  1,000 mL/hr Route  IntraVENous Administered By  Lucho Phipps                  Signed:  Hollis Bella MD    Part of this note may have been written by using a voice dictation software. The note has been proof read but may still contain some grammatical/other typographical errors.

## 2021-09-15 NOTE — ED TRIAGE NOTES
Arrives with face mask in place. Arrives from home via [facility] with reports generalized weakness. Onset 1 week pta. Pt with recent surgery, proctectomy with dr Olivia Gregory. Also followed by plastic surgery. Reports \"how can my vital signs be normal and I feel awful\". Seen here yesterday for same. Colostomy and DIANE draina appear to be draining well.

## 2021-09-15 NOTE — ED PROVIDER NOTES
Mask was worn during the entire patient examination. Harris Cao is a 66 y.o. male who presents to the ED with a chief complaint of fatigue and weakness. Patient has a history of multiple abdominal surgeries and ostomies. States symptoms have gradually worsened over the last 3 days with generalized fatigue. When asked about his urination states he has had some trouble with continual urinary leakage. Has never had any kidney problems in his past.  Denies any vomiting or diarrhea. States he still drinking fluids. Is not on any blood thinners. The history is provided by the patient. Fatigue  Pertinent negatives include no shortness of breath, no chest pain, no vomiting and no nausea.         Past Medical History:   Diagnosis Date    Acute deep vein thrombosis (DVT) of non-extremity vein 5/20/2019    Family history of malignant neoplasm of gastrointestinal tract     mother colon/ovarian cancer 67    Fecal incontinence     LAR syndrome    Former cigarette smoker     History of rectal cancer     Hypothyroid     stable w/med    Infection and inflammatory reaction due to other internal prosthetic devices, implants and grafts, subsequent encounter 2017    abd wound/mesh colostomy    Insomnia     takes meds    Major depression     Osteoarthritis, hand     Personal history of colonic polyps 9/2013    x 1    Personal history of malignant neoplasm of rectum, rectosigmoid junction, and anus 9/2013    Psychiatric disorder     anxiety       Past Surgical History:   Procedure Laterality Date    COLONOSCOPY N/A 2/12/2018    COLONOSCOPY / BMI=21 performed by Abebe [facility]all [facility] at [facility] ENDOSCOPY    COLONOSCOPY N/A 3/5/2021    COLONOSCOPY/BMI 19 performed by Delmy Estevez MD at [address] [place] [address]  2/12/2018         COLONOSCOPY THRU STOMA,LESN RMVL W/SNARE  3/5/2021         ENDOSCOPY, COLON, DIAGNOSTIC  last 2/3/15    Emily--no polyps--3 year recall    HX COLOSTOMY  5/11/16  HX GI  2016    Sacral nerve stimlator lead trial (unsucessful) and removal    HX HERNIA REPAIR  3/2015, 2016    HX HERNIA REPAIR      and Colostomy in May    HX OTHER SURGICAL  10/7/2013    Emily---endorectal us    HX OTHER SURGICAL Bilateral     cataracts      HX POLYPECTOMY      HX TOTAL COLECTOMY  2013    Emily--lap LAR with coloproctostomy, mobilization splenic flexure, diverting loop ileostomy    HX TOTAL COLECTOMY Right 2014    for leak    HX VASCULAR ACCESS Left     single lumen port/subsequently removed    IR CHANGE ABSCESS / CYST CATHETER  2021    IR CHANGE ABSCESS / CYST CATHETER  2021    IR CHANGE ABSCESS / CYST CATHETER  2021    IR CHANGE ABSCESS / CYST CATHETER  2021    IR INJ ABS CYST VIA CATHETER / TUBE  2021    IR INJ ABS CYST VIA CATHETER / TUBE  2021    IR INSERT TUNL CVC W/O PORT OVER 5 YR  2019    IR REPLACE CVC TUNNELED W/O PORT  2019         Family History:   Problem Relation Age of Onset    Cancer Mother         colon and ovarian    Cancer Brother         prostate    Alcohol abuse Brother     Cancer Maternal Grandmother         bone    Alcohol abuse Father     Alcohol abuse Sister     Stroke Paternal Grandfather        Social History     Socioeconomic History    Marital status: SINGLE     Spouse name: Not on file    Number of children: Not on file    Years of education: Not on file    Highest education level: Not on file   Occupational History    Not on file   Tobacco Use    Smoking status: Former Smoker     Packs/day: 1.00     Years: 4.00     Pack years: 4.00     Types: Cigarettes     Quit date: 1963     Years since quittin.8    Smokeless tobacco: Never Used   Substance and Sexual Activity    Alcohol use: Not Currently     Alcohol/week: 11.7 standard drinks     Types: 14 Cans of beer per week     Comment: advised stop drinking given issues    Drug use: No    Sexual activity: Never Other Topics Concern    Not on file   Social History Narrative    Not on file     Social Determinants of Health     Financial Resource Strain:     Difficulty of Paying Living Expenses:    Food Insecurity:     Worried About Running Out of Food in the Last Year:     [address] in the Last Year:    Transportation Needs:     Lack of Transportation (Medical):  Lack of Transportation (Non-Medical):    Physical Activity:     Days of Exercise per Week:     Minutes of Exercise per Session:    Stress:     Feeling of Stress :    Social Connections:     Frequency of Communication with Friends and Family:     Frequency of Social Gatherings with Friends and Family:     Attends Spiritism Services:     Active Member of Clubs or Organizations:     Attends Club or Organization Meetings:     Marital Status:    Intimate Partner Violence:     Fear of Current or Ex-Partner:     Emotionally Abused:     Physically Abused:     Sexually Abused: ALLERGIES: Patient has no known allergies. Review of Systems   Constitutional: Positive for fatigue. Negative for activity change, appetite change and chills. HENT: Negative for congestion. Eyes: Negative for photophobia and visual disturbance. Respiratory: Negative for chest tightness, shortness of breath, wheezing and stridor. Cardiovascular: Negative for chest pain and palpitations. Gastrointestinal: Negative for abdominal pain, diarrhea, nausea and vomiting. Musculoskeletal: Negative for arthralgias, neck pain and neck stiffness. Skin: Negative for color change, rash and wound. All other systems reviewed and are negative. Vitals:    09/15/21 0604 09/15/21 0711 09/15/21 0719   BP: 114/75 134/74 133/76   Pulse: (!) 103 89 85   Resp: 18     Temp: 98 °F (36.7 °C)     SpO2: 98% 98% 97%   Weight: 56.7 kg (125 lb)     Height: 5' 8\" (1.727 m)              Physical Exam  Vitals and nursing note reviewed.    Constitutional:       General: He is not in acute distress. Appearance: Normal appearance. He is well-developed. He is not ill-appearing, toxic-appearing or diaphoretic. HENT:      Head: Normocephalic and atraumatic. Eyes:      General: No scleral icterus. Conjunctiva/sclera: Conjunctivae normal.   Neck:      Trachea: No tracheal deviation. Cardiovascular:      Rate and Rhythm: Normal rate and regular rhythm. Heart sounds: No murmur heard. No friction rub. No gallop. Pulmonary:      Effort: Pulmonary effort is normal. No respiratory distress. Breath sounds: No stridor. No wheezing, rhonchi or rales. Chest:      Chest wall: No tenderness. Abdominal:      General: Abdomen is flat. Tenderness: There is no abdominal tenderness. There is no right CVA tenderness, left CVA tenderness, guarding or rebound. Comments: Ostomy present with old surgical scars. Genitourinary:     Comments: Bishop catheter was placed 1 L of bloody urine drained. Skin:     General: Skin is warm. Capillary Refill: Capillary refill takes less than 2 seconds. Neurological:      General: No focal deficit present. Mental Status: He is alert and oriented to person, place, and time. Mental status is at baseline. Cranial Nerves: No cranial nerve deficit. Sensory: No sensory deficit. Motor: No weakness. Psychiatric:         Mood and Affect: Mood normal.         Behavior: Behavior normal.          MDM  Number of Diagnoses or Management Options  Diagnosis management comments: Patient has acute kidney failure likely postobstructive from ureteral blockage. Likely from clot. Nurse did place Bishop and did manual irrigation. Ultrasound shows bilateral hydrodiscussed case with hospitalist for admission. Uday Randall MD; 9/15/2021 @9:00 AM Voice dictation software was used during the making of this note. This software is not perfect and grammatical and other typographical errors may be present.   This note has not been proofread for errors.  ===================================================================          Amount and/or Complexity of Data Reviewed  Clinical lab tests: ordered and reviewed (Results for orders placed or performed during the hospital encounter of 09/15/21  -CBC WITH AUTOMATED DIFF       Result                      Value             Ref Range           WBC                         11.2 (H)          4.3 - 11.1 K*       RBC                         3.56 (L)          4.23 - 5.6 M*       HGB                         10.0 (L)          13.6 - 17.2 *       HCT                         32.3 (L)          41.1 - 50.3 %       MCV                         90.7              79.6 - 97.8 *       MCH                         28.1              26.1 - 32.9 *       MCHC                        31.0 (L)          31.4 - 35.0 *       RDW                         14.5              11.9 - 14.6 %       PLATELET                    370               150 - 450 K/*       MPV                         8.8 (L)           9.4 - 12.3 FL       ABSOLUTE NRBC               0.00              0.0 - 0.2 K/*       DF                          AUTOMATED                             NEUTROPHILS                 85 (H)            43 - 78 %           LYMPHOCYTES                 4 (L)             13 - 44 %           MONOCYTES                   9                 4.0 - 12.0 %        EOSINOPHILS                 1                 0.5 - 7.8 %         BASOPHILS                   0                 0.0 - 2.0 %         IMMATURE GRANULOCYTES       0                 0.0 - 5.0 %         ABS. NEUTROPHILS            9.5 (H)           1.7 - 8.2 K/*       ABS. LYMPHOCYTES            0.5               0.5 - 4.6 K/*       ABS. MONOCYTES              1.0               0.1 - 1.3 K/*       ABS. EOSINOPHILS            0.1               0.0 - 0.8 K/*       ABS. BASOPHILS              0.0               0.0 - 0.2 K/*       ABS. IMM.  GRANS.            0.1               0.0 - 0.5 K/*  -METABOLIC PANEL, COMPREHENSIVE       Result                      Value             Ref Range           Sodium                      132 (L)           138 - 145 mm*       Potassium                   6.1 (HH)          3.5 - 5.1 mm*       Chloride                    103               98 - 107 mmo*       CO2                         16 (L)            21 - 32 mmol*       Anion gap                   13                7 - 16 mmol/L       Glucose                     113 (H)           65 - 100 mg/*       BUN                         91 (H)            8 - 23 MG/DL        Creatinine                  6.09 (H)          0.8 - 1.5 MG*       GFR est AA                  12 (L)            >60 ml/min/1*       GFR est non-AA              10 (L)            >60 ml/min/1*       Calcium                     9.0               8.3 - 10.4 M*       Bilirubin, total            0.3               0.2 - 1.1 MG*       ALT (SGPT)                  37                12 - 65 U/L         AST (SGOT)                  26                15 - 37 U/L         Alk. phosphatase            95                50 - 136 U/L        Protein, total              7.8               6.3 - 8.2 g/*       Albumin                     3.0 (L)           3.2 - 4.6 g/*       Globulin                    4.8 (H)           2.3 - 3.5 g/*       A-G Ratio                   0.6 (L)           1.2 - 3.5      -MAGNESIUM       Result                      Value             Ref Range           Magnesium                   3.1 (H)           1.8 - 2.4 mg* )  Tests in the radiology section of CPT®: ordered and reviewed (US RETROPERITONEUM COMP    Result Date: 9/15/2021  INDICATION:  Acute renal insufficiency TECHNIQUE: Real-time sonography of the kidneys, retroperitoneum and bladder was performed with multiple static images obtained. FINDINGS: The right kidney measures 12.8 cm and the left kidney measures 13 cm in length. There is bilateral hydronephrosis, right greater than left.   There are small echogenic areas in both kidneys, likely stones. Largest is right lower pole. There are no significant renal masses. The aorta and IVC are normal in caliber. The urinary bladder is collapsed around a Ibshop catheter. There may be some debris in the bladder. There is no ascites. 1.  Multiple kidney stones.  2.  Bilateral hydronephrosis, right greater than left.    )  Independent visualization of images, tracings, or specimens: yes (Normal sinus rhythm, narrow QRS complex, no bundle branch blocks, and no ST segment elevation )           Procedures

## 2021-09-15 NOTE — ED NOTES
TRANSFER - OUT REPORT:    Verbal report given to BEATA Salas(name) on Michael Bowser  being transferred to 206(unit) for routine progression of care       Report consisted of patients Situation, Background, Assessment and   Recommendations(SBAR). Information from the following report(s) ED Summary was reviewed with the receiving nurse. Lines:   Peripheral IV 09/15/21 Right Antecubital (Active)   Site Assessment Clean, dry, & intact 09/15/21 0608   Phlebitis Assessment 0 09/15/21 0608   Infiltration Assessment 0 09/15/21 0608   Dressing Status Clean, dry, & intact 09/15/21 0608   Dressing Type Transparent 09/15/21 0608   Hub Color/Line Status Green 09/15/21 0608       Peripheral IV 09/15/21 Right Hand (Active)   Site Assessment Clean, dry, & intact 09/15/21 1546   Phlebitis Assessment 0 09/15/21 1546   Infiltration Assessment 0 09/15/21 1546   Dressing Status Clean, dry, & intact 09/15/21 1546        Opportunity for questions and clarification was provided.       Patient transported with:  Patient belongings, patient chart

## 2021-09-16 ENCOUNTER — HOME CARE VISIT (OUTPATIENT)
Dept: HOME HEALTH SERVICES | Facility: HOME HEALTH | Age: 76
End: 2021-09-16

## 2021-09-16 ENCOUNTER — APPOINTMENT (OUTPATIENT)
Dept: INTERVENTIONAL RADIOLOGY/VASCULAR | Age: 76
DRG: 683 | End: 2021-09-16
Attending: INTERNAL MEDICINE
Payer: MEDICARE

## 2021-09-16 LAB
ANION GAP SERPL CALC-SCNC: 10 MMOL/L (ref 7–16)
ATRIAL RATE: 83 BPM
BASOPHILS # BLD: 0 K/UL (ref 0–0.2)
BASOPHILS NFR BLD: 0 % (ref 0–2)
BUN SERPL-MCNC: 74 MG/DL (ref 8–23)
CALCIUM SERPL-MCNC: 8.5 MG/DL (ref 8.3–10.4)
CALCULATED P AXIS, ECG09: 54 DEGREES
CALCULATED R AXIS, ECG10: 63 DEGREES
CALCULATED T AXIS, ECG11: 56 DEGREES
CHLORIDE SERPL-SCNC: 110 MMOL/L (ref 98–107)
CO2 SERPL-SCNC: 22 MMOL/L (ref 21–32)
CREAT SERPL-MCNC: 3.85 MG/DL (ref 0.8–1.5)
DIAGNOSIS, 93000: NORMAL
DIFFERENTIAL METHOD BLD: ABNORMAL
EOSINOPHIL # BLD: 0 K/UL (ref 0–0.8)
EOSINOPHIL NFR BLD: 0 % (ref 0.5–7.8)
ERYTHROCYTE [DISTWIDTH] IN BLOOD BY AUTOMATED COUNT: 14.2 % (ref 11.9–14.6)
GLUCOSE SERPL-MCNC: 132 MG/DL (ref 65–100)
HCT VFR BLD AUTO: 27.8 % (ref 41.1–50.3)
HGB BLD-MCNC: 8.7 G/DL (ref 13.6–17.2)
IMM GRANULOCYTES # BLD AUTO: 0.6 K/UL (ref 0–0.5)
IMM GRANULOCYTES NFR BLD AUTO: 3 % (ref 0–5)
LYMPHOCYTES # BLD: 0.4 K/UL (ref 0.5–4.6)
LYMPHOCYTES NFR BLD: 2 % (ref 13–44)
MCH RBC QN AUTO: 27.5 PG (ref 26.1–32.9)
MCHC RBC AUTO-ENTMCNC: 31.3 G/DL (ref 31.4–35)
MCV RBC AUTO: 88 FL (ref 79.6–97.8)
MONOCYTES # BLD: 1.6 K/UL (ref 0.1–1.3)
MONOCYTES NFR BLD: 8 % (ref 4–12)
NEUTS SEG # BLD: 17.3 K/UL (ref 1.7–8.2)
NEUTS SEG NFR BLD: 87 % (ref 43–78)
NRBC # BLD: 0 K/UL (ref 0–0.2)
P-R INTERVAL, ECG05: 186 MS
PLATELET # BLD AUTO: 283 K/UL (ref 150–450)
PLATELET COMMENTS,PCOM: ADEQUATE
PMV BLD AUTO: 9.1 FL (ref 9.4–12.3)
POTASSIUM SERPL-SCNC: 4.8 MMOL/L (ref 3.5–5.1)
Q-T INTERVAL, ECG07: 374 MS
QRS DURATION, ECG06: 90 MS
QTC CALCULATION (BEZET), ECG08: 439 MS
RBC # BLD AUTO: 3.16 M/UL (ref 4.23–5.6)
RBC MORPH BLD: ABNORMAL
SODIUM SERPL-SCNC: 142 MMOL/L (ref 136–145)
VENTRICULAR RATE, ECG03: 83 BPM
WBC # BLD AUTO: 19.9 K/UL (ref 4.3–11.1)

## 2021-09-16 PROCEDURE — 0T9130Z DRAINAGE OF LEFT KIDNEY WITH DRAINAGE DEVICE, PERCUTANEOUS APPROACH: ICD-10-PCS | Performed by: RADIOLOGY

## 2021-09-16 PROCEDURE — 74011000258 HC RX REV CODE- 258: Performed by: NURSE PRACTITIONER

## 2021-09-16 PROCEDURE — 36415 COLL VENOUS BLD VENIPUNCTURE: CPT

## 2021-09-16 PROCEDURE — 87086 URINE CULTURE/COLONY COUNT: CPT

## 2021-09-16 PROCEDURE — C1769 GUIDE WIRE: HCPCS

## 2021-09-16 PROCEDURE — 99152 MOD SED SAME PHYS/QHP 5/>YRS: CPT

## 2021-09-16 PROCEDURE — 74011250636 HC RX REV CODE- 250/636: Performed by: INTERNAL MEDICINE

## 2021-09-16 PROCEDURE — 74011250637 HC RX REV CODE- 250/637: Performed by: HOSPITALIST

## 2021-09-16 PROCEDURE — 85025 COMPLETE CBC W/AUTO DIFF WBC: CPT

## 2021-09-16 PROCEDURE — 74011000636 HC RX REV CODE- 636: Performed by: RADIOLOGY

## 2021-09-16 PROCEDURE — 77030003504 HC NDL BIOP TISS COOK -A

## 2021-09-16 PROCEDURE — 80048 BASIC METABOLIC PNL TOTAL CA: CPT

## 2021-09-16 PROCEDURE — 0T9030Z DRAINAGE OF RIGHT KIDNEY WITH DRAINAGE DEVICE, PERCUTANEOUS APPROACH: ICD-10-PCS | Performed by: RADIOLOGY

## 2021-09-16 PROCEDURE — C1729 CATH, DRAINAGE: HCPCS

## 2021-09-16 PROCEDURE — 99233 SBSQ HOSP IP/OBS HIGH 50: CPT | Performed by: UROLOGY

## 2021-09-16 PROCEDURE — 87088 URINE BACTERIA CULTURE: CPT

## 2021-09-16 PROCEDURE — 77030040393 HC DRSG OPTIFOAM GENT MDII -B

## 2021-09-16 PROCEDURE — 65660000000 HC RM CCU STEPDOWN

## 2021-09-16 PROCEDURE — 74011250636 HC RX REV CODE- 250/636: Performed by: RADIOLOGY

## 2021-09-16 PROCEDURE — 74011250636 HC RX REV CODE- 250/636: Performed by: NURSE PRACTITIONER

## 2021-09-16 PROCEDURE — 2709999900 HC NON-CHARGEABLE SUPPLY

## 2021-09-16 PROCEDURE — 77030025702 HC BG URIN DRN MRTM -A

## 2021-09-16 PROCEDURE — 87186 SC STD MICRODIL/AGAR DIL: CPT

## 2021-09-16 PROCEDURE — 74011000250 HC RX REV CODE- 250: Performed by: RADIOLOGY

## 2021-09-16 RX ORDER — SODIUM CHLORIDE 9 MG/ML
25 INJECTION, SOLUTION INTRAVENOUS CONTINUOUS
Status: DISCONTINUED | OUTPATIENT
Start: 2021-09-16 | End: 2021-09-16

## 2021-09-16 RX ORDER — SODIUM CHLORIDE, SODIUM LACTATE, POTASSIUM CHLORIDE, CALCIUM CHLORIDE 600; 310; 30; 20 MG/100ML; MG/100ML; MG/100ML; MG/100ML
75 INJECTION, SOLUTION INTRAVENOUS CONTINUOUS
Status: DISCONTINUED | OUTPATIENT
Start: 2021-09-16 | End: 2021-09-19 | Stop reason: HOSPADM

## 2021-09-16 RX ORDER — LIDOCAINE HYDROCHLORIDE 20 MG/ML
2-20 INJECTION, SOLUTION INFILTRATION; PERINEURAL ONCE
Status: COMPLETED | OUTPATIENT
Start: 2021-09-16 | End: 2021-09-16

## 2021-09-16 RX ORDER — MIDAZOLAM HYDROCHLORIDE 1 MG/ML
.5-2 INJECTION, SOLUTION INTRAMUSCULAR; INTRAVENOUS
Status: DISCONTINUED | OUTPATIENT
Start: 2021-09-16 | End: 2021-09-18

## 2021-09-16 RX ORDER — DIPHENHYDRAMINE HYDROCHLORIDE 50 MG/ML
12.5-5 INJECTION, SOLUTION INTRAMUSCULAR; INTRAVENOUS ONCE
Status: COMPLETED | OUTPATIENT
Start: 2021-09-16 | End: 2021-09-16

## 2021-09-16 RX ORDER — FENTANYL CITRATE 50 UG/ML
25-50 INJECTION, SOLUTION INTRAMUSCULAR; INTRAVENOUS
Status: DISCONTINUED | OUTPATIENT
Start: 2021-09-16 | End: 2021-09-16

## 2021-09-16 RX ADMIN — LIDOCAINE HYDROCHLORIDE 150 MG: 20 INJECTION, SOLUTION INFILTRATION; PERINEURAL at 11:03

## 2021-09-16 RX ADMIN — IOPAMIDOL 10 ML: 612 INJECTION, SOLUTION INTRAVENOUS at 11:15

## 2021-09-16 RX ADMIN — MIDAZOLAM 0.5 MG: 1 INJECTION INTRAMUSCULAR; INTRAVENOUS at 10:49

## 2021-09-16 RX ADMIN — Medication 10 ML: at 05:33

## 2021-09-16 RX ADMIN — LIDOCAINE HYDROCHLORIDE 150 MG: 20 INJECTION, SOLUTION INFILTRATION; PERINEURAL at 10:51

## 2021-09-16 RX ADMIN — MIDAZOLAM 0.5 MG: 1 INJECTION INTRAMUSCULAR; INTRAVENOUS at 11:03

## 2021-09-16 RX ADMIN — SODIUM CHLORIDE 25 ML/HR: 900 INJECTION, SOLUTION INTRAVENOUS at 10:26

## 2021-09-16 RX ADMIN — FAMOTIDINE 20 MG: 20 TABLET, FILM COATED ORAL at 13:17

## 2021-09-16 RX ADMIN — FENTANYL CITRATE 25 MCG: 50 INJECTION INTRAMUSCULAR; INTRAVENOUS at 10:36

## 2021-09-16 RX ADMIN — CLONAZEPAM 2 MG: 1 TABLET ORAL at 22:26

## 2021-09-16 RX ADMIN — SODIUM CHLORIDE, SODIUM LACTATE, POTASSIUM CHLORIDE, AND CALCIUM CHLORIDE 75 ML/HR: 600; 310; 30; 20 INJECTION, SOLUTION INTRAVENOUS at 13:19

## 2021-09-16 RX ADMIN — FENTANYL CITRATE 25 MCG: 50 INJECTION INTRAMUSCULAR; INTRAVENOUS at 11:03

## 2021-09-16 RX ADMIN — CEFTRIAXONE 1 G: 1 INJECTION, POWDER, FOR SOLUTION INTRAMUSCULAR; INTRAVENOUS at 09:33

## 2021-09-16 RX ADMIN — DIPHENHYDRAMINE HYDROCHLORIDE 25 MG: 50 INJECTION, SOLUTION INTRAMUSCULAR; INTRAVENOUS at 10:44

## 2021-09-16 RX ADMIN — FENTANYL CITRATE 25 MCG: 50 INJECTION INTRAMUSCULAR; INTRAVENOUS at 10:49

## 2021-09-16 RX ADMIN — LEVOTHYROXINE SODIUM 125 MCG: 0.12 TABLET ORAL at 05:29

## 2021-09-16 RX ADMIN — MIDAZOLAM 0.5 MG: 1 INJECTION INTRAMUSCULAR; INTRAVENOUS at 10:36

## 2021-09-16 RX ADMIN — ACETAMINOPHEN 650 MG: 325 TABLET ORAL at 00:32

## 2021-09-16 RX ADMIN — OLANZAPINE 7.5 MG: 5 TABLET, FILM COATED ORAL at 22:25

## 2021-09-16 RX ADMIN — AMITRIPTYLINE HYDROCHLORIDE 100 MG: 50 TABLET, FILM COATED ORAL at 22:26

## 2021-09-16 NOTE — PROGRESS NOTES
Chart review complete, CM met with pt at bedside, pt found laying on stretcher sleeping awakens to answer questions, CM maintained social distance and PPE in place, pt is alert and oriented x4 at this time, easily falls back to sleep, pt was able to answer assessment questions, states lives alone in own condo with 1 step to enter and 13 steps inside home, states remains independent with ADLS, drives and affords medications without difficulty with insurance. Demographics, insurance and PCP confirmed. PT/OT evaluations pending to assist with dc needs, CM will remain available to assist as needed. Care Management Interventions  PCP Verified by CM:  Yes (Dr Mari Tavarez)  Physical Therapy Consult: Yes  Occupational Therapy Consult: Yes  Support Systems: Other (Comment), Child(nikki) (lves alone has family available)  Confirm Follow Up Transport: Family  Discharge Location  Discharge Placement: Unable to determine at this time

## 2021-09-16 NOTE — PROGRESS NOTES
TRANSFER - IN REPORT:    Verbal report received from Mustapha Hand, [facility]  being received from IR for routine post - op      Report consisted of patients Situation, Background, Assessment and   Recommendations(SBAR). Information from the following report(s) SBAR, Kardex, Procedure Summary and MAR was reviewed with the receiving nurse. Opportunity for questions and clarification was provided. Assessment completed upon patients arrival to unit and care assumed.

## 2021-09-16 NOTE — CONSULTS
Dr. Chris Self requesting patient be seen. He was seen on 9/14 in the office. Further surgical care is deferred to Dr. Nery Srivastava. Please see excerpt from Dr. Estevan Tejeda' office note. \"Suspect urinary leakage is either due to his underlying known neurogenic bladder, perineal injury inflammation from his proctectomy, or both. He is encouraged to keep f/u with Dr Micheal Wright function is normal.  There is also no current evidence of recurrence of the previously percutaneously drained pelvic/perirectal abscess, nor a fistula.     He strongly wants his right thigh staples out- I fear if we remove the staples and drain that he would not f/u with plastics. We will confirm or arrange a followup appt with Dr Nery Srivastava within the next few days. I do not have experience with this perineal reconstruction regarding routine postoperative care. Mr Marla Flood begrudgingly agrees. \"    Abiodun Urbina NP

## 2021-09-16 NOTE — PROGRESS NOTES
END OF SHIFT NOTE:    INTAKE/OUTPUT  09/15 0701 - 09/16 0700  In: 4963 [I.V.:1306]  Out: 3200 [Urine:3100; Drains:100]  Voiding: NO  Catheter: YES. Draining  Drain:   Xavier-Schaefer Drain Right Leg (Active)   Site Assessment Clean, dry, & intact 09/16/21 0349   Dressing Status Clean, dry, & intact 09/16/21 0349   Status Patent; Charged 09/16/21 0349   Drainage Color Serous 09/16/21 0349   Output (ml) 100 ml 09/15/21 1211               Flatus: Patient does have flatus present. Stool:  1 occurrences. Characteristics:  Stool Assessment  Stool Color: Brown  Stool Appearance: Loose  Stool Amount: Small  Stool Source/Status: Colostomy    Emesis: 0 occurrences. Characteristics:        VITAL SIGNS  Patient Vitals for the past 12 hrs:   Temp Pulse Resp BP SpO2   09/16/21 0312 99 °F (37.2 °C) 100 18 130/69 97 %   09/16/21 0146 99.2 °F (37.3 °C)       09/16/21 0032  (!) 104 22     09/16/21 0015 (!) 102.7 °F (39.3 °C) (!) 110 28 118/68 93 %   09/15/21 2229  (!) 111      09/15/21 2042 98.5 °F (36.9 °C) (!) 102 24 (!) 144/67 96 %   09/15/21 1822 100.2 °F (37.9 °C) (!) 107 24 129/65 93 %       Pain Assessment  Pain Intensity 1: 0 (09/15/21 1901)        Patient Stated Pain Goal: 0    Ambulating  Yes    Shift report to be given to oncoming nurse at the bedside.     Yunier Farrell RN

## 2021-09-16 NOTE — PROGRESS NOTES
Pt has rectal sutures from previous admission. Sutures are loosened and coming apart. MD notified. No new orders at this time. Dressing placed to contain drainage and prevent contamination. All needs met at this time.

## 2021-09-16 NOTE — PROGRESS NOTES
Physical Therapy Note:    Physical therapy evaluation orders received and chart reviewed. Attempted to see patient this AM to initiate assessment. Patient about to go to IR, discussed with RN who suggests deferring PT evaluation until after procedure/sometime tomorrow. Will follow and re-attempt at a later time/date as schedule permits/patient available.     Thank you,  Modesto Robertson, PT, DPT

## 2021-09-16 NOTE — PROGRESS NOTES
TRANSFER - OUT REPORT:    Verbal report given to Sydney Lawson RN on [address]  being transferred to 2 nd floor for routine post - op       Report consisted of patients Situation, Background, Assessment and   Recommendations(SBAR). Information from the following report(s) SBAR, Kardex, Procedure Summary and MAR was reviewed with the receiving nurse. Lines:   Peripheral IV 09/16/21 Right Forearm (Active)   Site Assessment Clean, dry, & intact 09/16/21 0823   Phlebitis Assessment 0 09/16/21 0823   Infiltration Assessment 0 09/16/21 0823   Dressing Status Clean, dry, & intact 09/16/21 0823   Dressing Type Tape;Transparent 09/16/21 0823   Hub Color/Line Status Blue; Infusing 09/16/21 8284        Opportunity for questions and clarification was provided. 6 week follow up to misael made.

## 2021-09-16 NOTE — PROGRESS NOTES
END OF SHIFT NOTE:    INTAKE/OUTPUT  09/15 0701 - 09/16 0700  In: 3315 [I.V.:1306]  Out: 3200 [Urine:3100; Drains:100]  Voiding: YES  Catheter: YES  Drain:   Xavier-Schaefer Drain Right Leg (Active)   Site Assessment Clean, dry, & intact 09/16/21 0823   Dressing Status Clean, dry, & intact 09/16/21 0823   Status Patent; Charged 09/16/21 0823   Drainage Color Serous 09/16/21 0823   Output (ml) 25 ml 09/16/21 1243       Nephrostomy Tube 09/16/21 Flank (Active)   Site Assessment Clean, dry, & intact 09/16/21 1243   Dressing Status Clean, dry, & intact 09/16/21 1243   Drainage Description Serosanguinous 09/16/21 1243   Status Patent;Draining 09/16/21 1243   Urine Output (mL) 475 ml 09/16/21 1753       Nephrostomy Tube 09/16/21 Flank (Active)   Site Assessment Clean, dry, & intact 09/16/21 1243   Dressing Status Clean, dry, & intact 09/16/21 1243   Drainage Description Serosanguinous 09/16/21 1243   Status Patent;Draining 09/16/21 1243   Urine Output (mL) 300 ml 09/16/21 1753               Flatus: Patient does have flatus present. Stool:  1 occurrences. (colostomy has stool, not emptied today)   Characteristics:  Stool Assessment  Stool Color: Brown  Stool Appearance: Loose  Stool Amount: Small  Stool Source/Status: Colostomy (not emptied)    Emesis: 0 occurrences.     Characteristics:        VITAL SIGNS  Patient Vitals for the past 12 hrs:   Temp Pulse Resp BP SpO2   09/16/21 1511 100.1 °F (37.8 °C) (!) 101 23 (!) 145/80 98 %   09/16/21 1225  95  134/64 96 %   09/16/21 1154  94 16 (!) 150/67 99 %   09/16/21 1145  95 16 (!) 159/73 95 %   09/16/21 1135  95 16 (!) 159/68 100 %   09/16/21 1125  98 16 (!) 165/74 100 %   09/16/21 1116  (!) 101 13 (!) 150/86 100 %   09/16/21 1110  98 12 (!) 150/82 100 %   09/16/21 1105  99 12 (!) 156/85 100 %   09/16/21 1100  99 13 (!) 151/87 100 %   09/16/21 1055  98 15 (!) 149/87 99 %   09/16/21 1050  96 11 (!) 147/83 100 %   09/16/21 1046  98 17 (!) 152/83 100 %   09/16/21 1041  98 17 (!) 165/87 97 %   09/16/21 1036  100 23 (!) 156/88 99 %   09/16/21 0959  99  (!) 146/73 96 %   09/16/21 0740 97.7 °F (36.5 °C) 94 23 132/78 100 %       Pain Assessment  Pain Intensity 1: 0 (09/16/21 1154)        Patient Stated Pain Goal: 0    Ambulating  No (not today)    Shift report given to oncoming nurse at the bedside.     Junior Becerra RN

## 2021-09-16 NOTE — PROGRESS NOTES
Admit Date: 9/15/2021    Subjective:     Max Marroquin is resting. Brito in place. Urine tan colored. Cn down to 3.85 today. Currently afebrile, tmax 102.7. Objective:     Patient Vitals for the past 8 hrs:   BP Temp Pulse Resp SpO2   09/16/21 0740 132/78 97.7 °F (36.5 °C) 94  100 %   09/16/21 0312 130/69 99 °F (37.2 °C) 100 18 97 %   09/16/21 0146  99.2 °F (37.3 °C)      09/16/21 0032   (!) 104 22      No intake/output data recorded.   09/14 1901 - 09/16 0700  In: 1306 [I.V.:1306]  Out: 3200 [Urine:3100; Drains:100]    Physical Exam:  GENERAL: alert, cooperative, no distress  LUNG: clear to auscultation bilaterally  HEART: regular rate and rhythm, S1, S2   ABDOMEN: soft, non-tender, no signs of acute abd, colostomy in place with stool, incisions intact, active BS  NEUROLOGIC: AOx3  : brito in place     Data Review   Recent Results (from the past 24 hour(s))   POTASSIUM    Collection Time: 09/15/21  9:29 AM   Result Value Ref Range    Potassium 5.9 (H) 3.5 - 5.1 mmol/L   EKG, 12 LEAD, INITIAL    Collection Time: 09/15/21 10:32 AM   Result Value Ref Range    Ventricular Rate 83 BPM    Atrial Rate 83 BPM    P-R Interval 186 ms    QRS Duration 90 ms    Q-T Interval 374 ms    QTC Calculation (Bezet) 439 ms    Calculated P Axis 54 degrees    Calculated R Axis 63 degrees    Calculated T Axis 56 degrees    Diagnosis       Normal sinus rhythm  Normal ECG  When compared with ECG of 20-AUG-2021 12:12,  Premature ventricular complexes are no longer Present  Confirmed by Khai Harrington MD (), MAX RAYO ([id number]) on 9/16/2021 7:24:54 AM     URINALYSIS W/ RFLX MICROSCOPIC    Collection Time: 09/15/21  3:33 PM   Result Value Ref Range    Color RED      Appearance TURBID      Specific gravity 1.012 1.001 - 1.023      pH (UA) 6.0 5.0 - 9.0      Protein 100 (A) NEG mg/dL    Glucose Negative mg/dL    Ketone Negative NEG mg/dL    Bilirubin Negative NEG      Blood LARGE (A) NEG      Urobilinogen 0.2 0.2 - 1.0 EU/dL Nitrites Negative NEG      Leukocyte Esterase LARGE (A) NEG      WBC >100 0 /hpf    RBC >100 0 /hpf    Epithelial cells 0-3 0 /hpf    Bacteria TRACE 0 /hpf    Other observations RESULTS VERIFIED MANUALLY     CULTURE, BLOOD    Collection Time: 09/15/21  3:33 PM    Specimen: Blood   Result Value Ref Range    Special Requests: RIGHT  HAND        Culture result: NO GROWTH AFTER 15 HOURS     METABOLIC PANEL, BASIC    Collection Time: 09/15/21  7:45 PM   Result Value Ref Range    Sodium 140 138 - 145 mmol/L    Potassium 4.7 3.5 - 5.1 mmol/L    Chloride 108 (H) 98 - 107 mmol/L    CO2 19 (L) 21 - 32 mmol/L    Anion gap 13 7 - 16 mmol/L    Glucose 143 (H) 65 - 100 mg/dL    BUN 78 (H) 8 - 23 MG/DL    Creatinine 4.45 (H) 0.8 - 1.5 MG/DL    GFR est AA 17 (L) >60 ml/min/1.73m2    GFR est non-AA 14 (L) >60 ml/min/1.73m2    Calcium 9.1 8.3 - 10.4 MG/DL   PROTHROMBIN TIME + INR    Collection Time: 09/15/21  7:45 PM   Result Value Ref Range    Prothrombin time 13.7 12.6 - 14.5 sec    INR 1.0     CULTURE, BLOOD    Collection Time: 09/15/21  7:45 PM    Specimen: Blood   Result Value Ref Range    Special Requests: LEFT  FOREARM        Culture result: NO GROWTH AFTER 11 HOURS     METABOLIC PANEL, BASIC    Collection Time: 09/16/21  3:55 AM   Result Value Ref Range    Sodium 142 136 - 145 mmol/L    Potassium 4.8 3.5 - 5.1 mmol/L    Chloride 110 (H) 98 - 107 mmol/L    CO2 22 21 - 32 mmol/L    Anion gap 10 7 - 16 mmol/L    Glucose 132 (H) 65 - 100 mg/dL    BUN 74 (H) 8 - 23 MG/DL    Creatinine 3.85 (H) 0.8 - 1.5 MG/DL    GFR est AA 20 (L) >60 ml/min/1.73m2    GFR est non-AA 16 (L) >60 ml/min/1.73m2    Calcium 8.5 8.3 - 10.4 MG/DL   CBC WITH AUTOMATED DIFF    Collection Time: 09/16/21  3:55 AM   Result Value Ref Range    WBC 19.9 (H) 4.3 - 11.1 K/uL    RBC 3.16 (L) 4.23 - 5.6 M/uL    HGB 8.7 (L) 13.6 - 17.2 g/dL    HCT 27.8 (L) 41.1 - 50.3 %    MCV 88.0 79.6 - 97.8 FL    MCH 27.5 26.1 - 32.9 PG    MCHC 31.3 (L) 31.4 - 35.0 g/dL    RDW 14.2 11.9 - 14.6 %    PLATELET 840 423 - 008 K/uL    MPV 9.1 (L) 9.4 - 12.3 FL    ABSOLUTE NRBC 0.00 0.0 - 0.2 K/uL    NEUTROPHILS 87 (H) 43 - 78 %    LYMPHOCYTES 2 (L) 13 - 44 %    MONOCYTES 8 4.0 - 12.0 %    EOSINOPHILS 0 (L) 0.5 - 7.8 %    BASOPHILS 0 0.0 - 2.0 %    IMMATURE GRANULOCYTES 3 0.0 - 5.0 %    ABS. NEUTROPHILS 17.3 (H) 1.7 - 8.2 K/UL    ABS. LYMPHOCYTES 0.4 (L) 0.5 - 4.6 K/UL    ABS. MONOCYTES 1.6 (H) 0.1 - 1.3 K/UL    ABS. EOSINOPHILS 0.0 0.0 - 0.8 K/UL    ABS. BASOPHILS 0.0 0.0 - 0.2 K/UL    ABS. IMM. GRANS. 0.6 (H) 0.0 - 0.5 K/UL    RBC COMMENTS NORMOCYTIC/NORMOCHROMIC      PLATELET COMMENTS ADEQUATE      DF AUTOMATED         Assessment:     Principal Problem:    REBEKAH (acute kidney injury) ([facility] [address] (9/15/2021)    Active Problems:    Hypothyroid (6/14/2019)      Overview: stable w/med      Bilateral hydronephrosis (5/11/2021)      Hyperkalemia (9/15/2021)      Hematuria (9/15/2021)      Urinary retention (9/15/2021)      66 y.o. male with a past medical hx of kidney stones, pelvic abscess and urinary retention who had a completion proctectomy by Dr. Estevan Tejeda.  A cystotomy occurred during surgery and was repaired intra-operatively by Dr. Estevan Tejeda. He presented for follow-up on 9/10 with cystogram performed in office showing no bladder injury with complete healing of repair and L grade 3 vesico-ureteral reflux. He presents to ER 9/15 with c/o fatigue and weakness. Cn found to be 6.09 (0.9 on 8/31). Brito placed with 1L of blood tinged UOP. Per notes, RN manually irrigated brito. Renal US shows bladder collapsed with brito in place with possible sediment in bladder and bilateral hydro with R>L with bilateral renal stones. We are consulted for urinary retention,  Bilateral hydronephrosis, gross hematuria. CT AP shows R>L hydro with inflammatory process in pelvis producing partial obs of ureters, probable defect in posterior bladder.   CT cystogram shows extravasation  through a large defect within the posterior bladder wall, fills the presacral space and approaches an apparent perianal fistulous tract. Had fever last night up to 102.7. UA with >100 WBC, >100 RBC, trace bacteria, urine culture added on, WBC 19.9. Plan:     Start IV rocephin. Follow cultures. Continue brito to drainage and irrigate only if absolutely necessary. IR consulted for bilateral percutaneous NT. Ex lap/bladder repair not recommended at this point d/t high risk of failure given recent surgery and prior radiation. IR consulted for bilateral NT. Gen surg consulted for any other recommendations,  Dr. Raina Kelley aware. Patient is seen and examined in collaboration with Dr. Leo Glasgow. Kristen Starkey NP  [facility]    Urology Attending Physician Note:     Admit Date: 9/15/2021    Subjective:     Patient had cystogram yesterday which showed that his prior posterior wall bladder repair has re-opened and leaked into the pre-sacral space and resulted in a sacral fluid collection. He has a very complex PMH including history of pelvic radiation, which made his bladder repair high risk for a leak after his recent proctactomy. Fluid appears to be extra-peritoneal and contained to this space. CT also shows bilateral hydronephrosis that may be related to urinary retention vs. Compression of the distal ureters from sacral fluid collection. Brito catheter has been draining over night 3 Liters UOP. He has had intermittent fevers up to 102 but is currently afebrile with stable vital signs. Cr trended down from 6 to 3 today. Nephrology following. He denies any abdominal pain. Currently NPO.        Objective:     Patient Vitals for the past 8 hrs:   BP Temp Pulse Resp SpO2   09/16/21 0959 (!) 146/73  99  96 %   09/16/21 0740 132/78 97.7 °F (36.5 °C) 94 23 100 %   09/16/21 0312 130/69 99 °F (37.2 °C) 100 18 97 %     09/16 0701 - 09/16 1900  In: -   Out: 1000 [Urine:1000]  09/14 1901 - 09/16 0700  In: 1306 [I.V.:1306]  Out: 3200 [Urine:3100; Drains:100]    Physical Exam:   Visit Vitals  BP (!) 146/73   Pulse 99   Temp 97.7 °F (36.5 °C)   Resp 23   Ht 5' 8\" (1.727 m)   Wt 125 lb (56.7 kg)   SpO2 96%   BMI 19.01 kg/m²        GENERAL: No acute distress, Awake, Alert, Oriented X 3  CARDIAC: regular rate and rhythm  CHEST AND LUNG: Easy work of breathing  ABDOMEN: soft, non tender, non-distended, colostomy with stool, no peritoneal signs, midline incision c/d/i with steri-strips. : Bishop catheter with clear pink urine.             Data Review   Recent Results (from the past 24 hour(s))   EKG, 12 LEAD, INITIAL    Collection Time: 09/15/21 10:32 AM   Result Value Ref Range    Ventricular Rate 83 BPM    Atrial Rate 83 BPM    P-R Interval 186 ms    QRS Duration 90 ms    Q-T Interval 374 ms    QTC Calculation (Bezet) 439 ms    Calculated P Axis 54 degrees    Calculated R Axis 63 degrees    Calculated T Axis 56 degrees    Diagnosis       Normal sinus rhythm  Normal ECG  When compared with ECG of 20-AUG-2021 12:12,  Premature ventricular complexes are no longer Present  Confirmed by Isabella Acosta MD (), MAX RAYO ([id number]) on 9/16/2021 7:24:54 AM     URINALYSIS W/ RFLX MICROSCOPIC    Collection Time: 09/15/21  3:33 PM   Result Value Ref Range    Color RED      Appearance TURBID      Specific gravity 1.012 1.001 - 1.023      pH (UA) 6.0 5.0 - 9.0      Protein 100 (A) NEG mg/dL    Glucose Negative mg/dL    Ketone Negative NEG mg/dL    Bilirubin Negative NEG      Blood LARGE (A) NEG      Urobilinogen 0.2 0.2 - 1.0 EU/dL    Nitrites Negative NEG      Leukocyte Esterase LARGE (A) NEG      WBC >100 0 /hpf    RBC >100 0 /hpf    Epithelial cells 0-3 0 /hpf    Bacteria TRACE 0 /hpf    Other observations RESULTS VERIFIED MANUALLY     CULTURE, BLOOD    Collection Time: 09/15/21  3:33 PM    Specimen: Blood   Result Value Ref Range    Special Requests: RIGHT  HAND        Culture result: NO GROWTH AFTER 15 HOURS     METABOLIC PANEL, BASIC    Collection Time: 09/15/21  7:45 PM   Result Value Ref Range    Sodium 140 138 - 145 mmol/L    Potassium 4.7 3.5 - 5.1 mmol/L    Chloride 108 (H) 98 - 107 mmol/L    CO2 19 (L) 21 - 32 mmol/L    Anion gap 13 7 - 16 mmol/L    Glucose 143 (H) 65 - 100 mg/dL    BUN 78 (H) 8 - 23 MG/DL    Creatinine 4.45 (H) 0.8 - 1.5 MG/DL    GFR est AA 17 (L) >60 ml/min/1.73m2    GFR est non-AA 14 (L) >60 ml/min/1.73m2    Calcium 9.1 8.3 - 10.4 MG/DL   PROTHROMBIN TIME + INR    Collection Time: 09/15/21  7:45 PM   Result Value Ref Range    Prothrombin time 13.7 12.6 - 14.5 sec    INR 1.0     CULTURE, BLOOD    Collection Time: 09/15/21  7:45 PM    Specimen: Blood   Result Value Ref Range    Special Requests: LEFT  FOREARM        Culture result: NO GROWTH AFTER 11 HOURS     METABOLIC PANEL, BASIC    Collection Time: 09/16/21  3:55 AM   Result Value Ref Range    Sodium 142 136 - 145 mmol/L    Potassium 4.8 3.5 - 5.1 mmol/L    Chloride 110 (H) 98 - 107 mmol/L    CO2 22 21 - 32 mmol/L    Anion gap 10 7 - 16 mmol/L    Glucose 132 (H) 65 - 100 mg/dL    BUN 74 (H) 8 - 23 MG/DL    Creatinine 3.85 (H) 0.8 - 1.5 MG/DL    GFR est AA 20 (L) >60 ml/min/1.73m2    GFR est non-AA 16 (L) >60 ml/min/1.73m2    Calcium 8.5 8.3 - 10.4 MG/DL   CBC WITH AUTOMATED DIFF    Collection Time: 09/16/21  3:55 AM   Result Value Ref Range    WBC 19.9 (H) 4.3 - 11.1 K/uL    RBC 3.16 (L) 4.23 - 5.6 M/uL    HGB 8.7 (L) 13.6 - 17.2 g/dL    HCT 27.8 (L) 41.1 - 50.3 %    MCV 88.0 79.6 - 97.8 FL    MCH 27.5 26.1 - 32.9 PG    MCHC 31.3 (L) 31.4 - 35.0 g/dL    RDW 14.2 11.9 - 14.6 %    PLATELET 976 714 - 584 K/uL    MPV 9.1 (L) 9.4 - 12.3 FL    ABSOLUTE NRBC 0.00 0.0 - 0.2 K/uL    NEUTROPHILS 87 (H) 43 - 78 %    LYMPHOCYTES 2 (L) 13 - 44 %    MONOCYTES 8 4.0 - 12.0 %    EOSINOPHILS 0 (L) 0.5 - 7.8 %    BASOPHILS 0 0.0 - 2.0 %    IMMATURE GRANULOCYTES 3 0.0 - 5.0 %    ABS. NEUTROPHILS 17.3 (H) 1.7 - 8.2 K/UL    ABS. LYMPHOCYTES 0.4 (L) 0.5 - 4.6 K/UL    ABS.  MONOCYTES 1.6 (H) 0.1 - 1.3 K/UL    ABS. EOSINOPHILS 0.0 0.0 - 0.8 K/UL    ABS. BASOPHILS 0.0 0.0 - 0.2 K/UL    ABS. IMM. GRANS. 0.6 (H) 0.0 - 0.5 K/UL    RBC COMMENTS NORMOCYTIC/NORMOCHROMIC      PLATELET COMMENTS ADEQUATE      DF AUTOMATED             Assessment:     Principal Problem:    REBEKAH (acute kidney injury) ([address] (9/15/2021)    Active Problems:    Hypothyroid (6/14/2019)      Overview: stable w/med      Bilateral hydronephrosis (5/11/2021)      Hyperkalemia (9/15/2021)      Hematuria (9/15/2021)      Urinary retention (9/15/2021)      66 y.o. male with a past medical hx of kidney stones, pelvic abscess and urinary retention who had a completion proctectomy by Dr. Mariluz Saul.  A cystotomy occurred during surgery and was repaired intra-operatively by Dr. Mariluz Saul. He presented for follow-up on 9/10 with cystogram performed in office showing no bladder injury with complete healing of repair and L grade 3 vesico-ureteral reflux. He presents to ER 9/15 with c/o fatigue and weakness. Cn found to be 6.09 (0.9 on 8/31). Brito placed with 1L of blood tinged UOP and Cr now down to 3. .      Per notes, RN manually irrigated brito. Renal US shows bladder collapsed with brito in place with possible sediment in bladder and bilateral hydro with R>L with bilateral renal stones. We are consulted for urinary retention,  Bilateral hydronephrosis, gross hematuria. CT AP shows R>L hydro with inflammatory process in pelvis producing partial obs of ureters, probable defect in posterior bladder. CT cystogram shows extravasation  through a large defect within the posterior bladder wall, fills the presacral space and approaches an apparent perianal fistulous tract. At this point, his bladder repair appears to have re-opened after catheter removal.  Urine leak remains confined to the pelvic area and does not appear to include peritoneal space. Patient is clinically stable without signs of acute abdomen.      Had fever last night up to 102.7. UA with >100 WBC, >100 RBC, trace bacteria, urine culture added on, WBC 19.9. Currently afebrile, however. Plan:     -Continue brito to drainage. Irrigate only sparingly to avoid worsening defect in posterior bladder wall.    -Will have IR place bilateral NTs today to maximally divert urine away from bladder and to promote closure /healing of the posterior wall defect.   -Had a very long discussion this AM with patient about possible need for ex-lap and attempted open bladder repair vs. Cystectomy with urinary diversion. Risks/benefits of each reviewed. Surgery in him would be very complex and at high risk for complication given his medical history. He understands this. He wants to try to manage this conservatively with urinary diversion using brito/NTs for now but understands that one of the above options may be warranted if his condition worsens or if the defect does not heal after a few weeks of attempted max drainage.    -Will start on IV rocephin for empiric coverage given pelvic fluid collection, fevers and leukocytosis  -Trend Cr  -Nephrology following.   -Will follow     In addition to my documentation above, I have also reviewed the nurse practitioner note and personally examined the patient. I agree with the HPI, exam, assessment and plan. Jack Sorenson M.D.     [facility]  [address]  Phone: [phone]  Fax: [phone]

## 2021-09-16 NOTE — PROGRESS NOTES
Total sedation: 1.5 mg of Versed and 75 mcg of Fentanyl. 25 mg ofBenadryl given. Patient toleratyed the procedure well.

## 2021-09-16 NOTE — PROGRESS NOTES
Hospitalist Progress Note   Admit Date:  9/15/2021  7:03 AM   Name:  Gautam Marroquin   Age:  66 y.o. Sex:  male  :  1945   MRN:  [id number]   Room:      Presenting Complaint: Fatigue    Reason(s) for Admission: REBEKAH (acute kidney injury) ([address] [N17.9]  Hyperkalemia [E87.5]  Bilateral hydronephrosis [N13.30]  Hematuria [R31.9]     Hospital Course & Interval History:   Mr. Stephani Dalal is a 66 y.o. 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 He had a follow-up on 9/10 with cystogram showing no bladder injury. Patient reports that he has not been able to urinate much but has urinary incontinence. Creatinine was normal about 15 days ago. Renal ultrasound was done in the ER and showed bilateral hydronephrosis with bilateral renal stones. Bishop catheter was placed and showed return of 1 L of urine blood-tinged. Urology and Nephrology consulted. Cystogram demonstrated a posterior bladder wall defect. Urine leak does look to only be in pelvic region and not in peritoneal space. IR placed bilat perc neph tubes on Sep/16 for maximal diversion away from the bladder. Subjective (21): Patient states he bit his tongue and that it hurts and is impeding his speech. Bilat neph tubes placed today by IR. Nephrology recs appreciated. Urology plan noted. Follow a.m. labs. Assessment & Plan:   Principal Problem:  REBEKAH (acute kidney injury) ([facility] (9/15/2021)  Creatinine 6 on admission. Renal ultrasound shows bilateral hydronephrosis. Bishop catheter placed in the ER. Urology consulted. Appreciate recommendations. CT abd/pel was done and showed bilateral hydronephrosis right greater than left.   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 Nephrology consulted. Appreciate recommendations. Continue IV fluids  Sep/16: sCr 3.85     Active Problems:  Bilateral hydronephrosis (5/11/2021)  Urology consulted. Appreciate recommendations. Bishop catheter in place. Posterior bladder wall defect  Imaging reviewed; urine leak ppears confined to pelvic region  Monitor temp, WBC  Bilat perc neph tubes placed Sep/16 by IR  Ceftriaxone started  Monitor blood cultures  May need open ex lap or cystoscopy in the near future if defect does not heal     Hematuria  Urology on board. Monitor CBC. Bilat perc neph tubes placed Sep/16 by IR    Hyperkalemia, resolved  Continue to monitor     Moderate protein calorie malnutrition     Hypothyroidism  Levothyroxine     Anxiety  Continue home medications Elavil, Klonopin, olanzapine.       Dispo/Discharge Planning: > 2 midnights    Diet:  No diet orders on file  DVT PPx: SCD  Code status: Full Code    Hospital Problems as of 9/16/2021 Date Reviewed: 9/10/2021        Codes Class Noted - Resolved POA    Hyperkalemia ICD-10-CM: E87.5  ICD-9-CM: 276.7  9/15/2021 - Present Unknown        Hematuria ICD-10-CM: R31.9  ICD-9-CM: 599.70  9/15/2021 - Present Unknown        * (Principal) REBEKAH (acute kidney injury) ([facility] [address] ICD-10-CM: N17.9  ICD-9-CM: 584.9  9/15/2021 - Present Unknown        Urinary retention ICD-10-CM: R33.9  ICD-9-CM: 788.20  9/15/2021 - Present Unknown        Bilateral hydronephrosis ICD-10-CM: N13.30  ICD-9-CM: 456  5/11/2021 - Present Unknown        Hypothyroid ICD-10-CM: E03.9  ICD-9-CM: 244.9  6/14/2019 - Present Yes    Overview Signed 6/14/2019 12:12 AM by Ned Craft MD     stable w/med                   Objective:     Patient Vitals for the past 24 hrs:   Temp Pulse Resp BP SpO2   09/16/21 1154  94 16 (!) 150/67 99 %   09/16/21 1145  95 16 (!) 159/73 95 %   09/16/21 1135  95 16 (!) 159/68 100 %   09/16/21 1125  98 16 (!) 165/74 100 %   09/16/21 1116  (!) 101 13 (!) 150/86 100 %   09/16/21 1110  98 12 (!) 150/82 100 %   09/16/21 1105  99 12 (!) 156/85 100 %   09/16/21 1100  99 13 (!) 151/87 100 %   09/16/21 1055  98 15 (!) 149/87 99 %   09/16/21 1050  96 11 (!) 147/83 100 %   09/16/21 1046  98 17 (!) 152/83 100 %   09/16/21 1041  98 17 (!) 165/87 97 %   09/16/21 1036  100 23 (!) 156/88 99 %   09/16/21 0959  99  (!) 146/73 96 %   09/16/21 0740 97.7 °F (36.5 °C) 94 23 132/78 100 %   09/16/21 0312 99 °F (37.2 °C) 100 18 130/69 97 %   09/16/21 0146 99.2 °F (37.3 °C)       09/16/21 0032  (!) 104 22     09/16/21 0015 (!) 102.7 °F (39.3 °C) (!) 110 28 118/68 93 %   09/15/21 2229  (!) 111      09/15/21 2042 98.5 °F (36.9 °C) (!) 102 24 (!) 144/67 96 %   09/15/21 1822 100.2 °F (37.9 °C) (!) 107 24 129/65 93 %   09/15/21 1613  (!) 110      09/15/21 1601  (!) 112  (!) 185/88    09/15/21 1554 (!) 101.1 °F (38.4 °C)       09/15/21 1527  (!) 105  (!) 193/91    09/15/21 1414 97.7 °F (36.5 °C)         Oxygen Therapy  O2 Sat (%): 99 % (09/16/21 1154)  Pulse via Oximetry: 94 beats per minute (09/16/21 1154)  O2 Device: Nasal cannula (09/16/21 1154)  O2 Flow Rate (L/min): 2 l/min (09/16/21 1154)  ETCO2 (mmHg): 44 mmHg (09/16/21 1116)    Estimated body mass index is 19.01 kg/m² as calculated from the following:    Height as of this encounter: 5' 8\" (1.727 m). Weight as of this encounter: 56.7 kg (125 lb). Intake/Output Summary (Last 24 hours) at 9/16/2021 1241  Last data filed at 9/16/2021 0941  Gross per 24 hour   Intake 256 ml   Output 2675 ml   Net -2419 ml         Physical Exam:   General:    No overt distress  Head:  Normocephalic, atraumatic  Eyes:  Sclerae appear normal.  Pupils equally round. ENT:  Nares appear normal, no drainage. Moist oral mucosa  Neck:  No restricted ROM. Trachea midline   CV:   RRR. No m/r/g. Lungs: No wheezing.   Respirations even, unlabored  Abdomen:   Soft, nontender, non distended, colostomy  Extremities: No cyanosis or clubbing. Skin:     No rashes and normal coloration. Warm and dry. Neuro:  Regards, mid line gaze, EOMI, tongue midline, follows commands, antigravity throughout  Psych:  Normal mood and affect.   Alert and oriented x3    I have reviewed ordered lab tests and independently visualized imaging below:    Last 24hr Labs:  Recent Results (from the past 24 hour(s))   URINALYSIS W/ RFLX MICROSCOPIC    Collection Time: 09/15/21  3:33 PM   Result Value Ref Range    Color RED      Appearance TURBID      Specific gravity 1.012 1.001 - 1.023      pH (UA) 6.0 5.0 - 9.0      Protein 100 (A) NEG mg/dL    Glucose Negative mg/dL    Ketone Negative NEG mg/dL    Bilirubin Negative NEG      Blood LARGE (A) NEG      Urobilinogen 0.2 0.2 - 1.0 EU/dL    Nitrites Negative NEG      Leukocyte Esterase LARGE (A) NEG      WBC >100 0 /hpf    RBC >100 0 /hpf    Epithelial cells 0-3 0 /hpf    Bacteria TRACE 0 /hpf    Other observations RESULTS VERIFIED MANUALLY     CULTURE, BLOOD    Collection Time: 09/15/21  3:33 PM    Specimen: Blood   Result Value Ref Range    Special Requests: RIGHT  HAND        Culture result: NO GROWTH AFTER 15 HOURS     METABOLIC PANEL, BASIC    Collection Time: 09/15/21  7:45 PM   Result Value Ref Range    Sodium 140 138 - 145 mmol/L    Potassium 4.7 3.5 - 5.1 mmol/L    Chloride 108 (H) 98 - 107 mmol/L    CO2 19 (L) 21 - 32 mmol/L    Anion gap 13 7 - 16 mmol/L    Glucose 143 (H) 65 - 100 mg/dL    BUN 78 (H) 8 - 23 MG/DL    Creatinine 4.45 (H) 0.8 - 1.5 MG/DL    GFR est AA 17 (L) >60 ml/min/1.73m2    GFR est non-AA 14 (L) >60 ml/min/1.73m2    Calcium 9.1 8.3 - 10.4 MG/DL   PROTHROMBIN TIME + INR    Collection Time: 09/15/21  7:45 PM   Result Value Ref Range    Prothrombin time 13.7 12.6 - 14.5 sec    INR 1.0     CULTURE, BLOOD    Collection Time: 09/15/21  7:45 PM    Specimen: Blood   Result Value Ref Range    Special Requests: LEFT  FOREARM        Culture result: NO GROWTH AFTER 11 HOURS     METABOLIC PANEL, BASIC    Collection Time: 09/16/21  3:55 AM   Result Value Ref Range    Sodium 142 136 - 145 mmol/L    Potassium 4.8 3.5 - 5.1 mmol/L    Chloride 110 (H) 98 - 107 mmol/L    CO2 22 21 - 32 mmol/L    Anion gap 10 7 - 16 mmol/L    Glucose 132 (H) 65 - 100 mg/dL    BUN 74 (H) 8 - 23 MG/DL    Creatinine 3.85 (H) 0.8 - 1.5 MG/DL    GFR est AA 20 (L) >60 ml/min/1.73m2    GFR est non-AA 16 (L) >60 ml/min/1.73m2    Calcium 8.5 8.3 - 10.4 MG/DL   CBC WITH AUTOMATED DIFF    Collection Time: 09/16/21  3:55 AM   Result Value Ref Range    WBC 19.9 (H) 4.3 - 11.1 K/uL    RBC 3.16 (L) 4.23 - 5.6 M/uL    HGB 8.7 (L) 13.6 - 17.2 g/dL    HCT 27.8 (L) 41.1 - 50.3 %    MCV 88.0 79.6 - 97.8 FL    MCH 27.5 26.1 - 32.9 PG    MCHC 31.3 (L) 31.4 - 35.0 g/dL    RDW 14.2 11.9 - 14.6 %    PLATELET 551 234 - 286 K/uL    MPV 9.1 (L) 9.4 - 12.3 FL    ABSOLUTE NRBC 0.00 0.0 - 0.2 K/uL    NEUTROPHILS 87 (H) 43 - 78 %    LYMPHOCYTES 2 (L) 13 - 44 %    MONOCYTES 8 4.0 - 12.0 %    EOSINOPHILS 0 (L) 0.5 - 7.8 %    BASOPHILS 0 0.0 - 2.0 %    IMMATURE GRANULOCYTES 3 0.0 - 5.0 %    ABS. NEUTROPHILS 17.3 (H) 1.7 - 8.2 K/UL    ABS. LYMPHOCYTES 0.4 (L) 0.5 - 4.6 K/UL    ABS. MONOCYTES 1.6 (H) 0.1 - 1.3 K/UL    ABS. EOSINOPHILS 0.0 0.0 - 0.8 K/UL    ABS. BASOPHILS 0.0 0.0 - 0.2 K/UL    ABS. IMM. GRANS. 0.6 (H) 0.0 - 0.5 K/UL    RBC COMMENTS NORMOCYTIC/NORMOCHROMIC      PLATELET COMMENTS ADEQUATE      DF AUTOMATED         All Micro Results     Procedure Component Value Units Date/Time    CULTURE, URINE [[id number]] Collected: 09/16/21 1108    Order Status: Completed Updated: 09/16/21 1220    CULTURE, URINE [[id number]] Collected: 09/16/21 1058    Order Status: Completed Updated: 09/16/21 1220    CULTURE, BODY FLUID W Nilam Pipe [[id number]]     Order Status: Sent Specimen:  Body Fluid from Miscellaneous Fluid     CULTURE, BODY FLUID Sharita Ee STAIN [[id number]]     Order Status: Sent Specimen: Body Fluid from Miscellaneous Fluid     CULTURE, URINE [[id number]] Collected: 09/15/21 1531    Order Status: Completed Specimen: Cath Urine Updated: 09/16/21 1119    CULTURE, BLOOD [[id number]] Collected: 09/15/21 1945    Order Status: Completed Specimen: Blood Updated: 09/16/21 0749     Special Requests: --        LEFT  FOREARM       Culture result: NO GROWTH AFTER 11 HOURS       CULTURE, BLOOD [[id number]] Collected: 09/15/21 1533    Order Status: Completed Specimen: Blood Updated: 09/16/21 0749     Special Requests: --        RIGHT  HAND       Culture result: NO GROWTH AFTER 15 HOURS             Other Studies:  CT CYSTOGRAM    Result Date: 9/15/2021  CT CYSTOGRAM INDICATION: Bladder wall defect COMPARISON: Noncontrast CT from earlier today, also on 7/23/2021 TECHNIQUE: Multiple axial images were obtained through the pelvis following the administration of 250 mL of Cysto-Conray to the patient's indwelling Bishop catheter. Radiation dose reduction techniques were used for this study. Our CT scanners use one or all of the following: Automated exposure control, adjustment of the mA and/or kV according to patient size, iterative reconstruction. FINDINGS: Kidneys: Right hydronephrosis partially imaged. The left kidney is not seen within the field-of-view. Bladder: Large defect within the posterior wall bladder measuring approximately 2.9 cm. There is extravasation of contrast from the bladder through this defect associated with either tumor or abscess within the presacral space. Contrast fills this space and extends inferiorly to the left of the anal canal, approaching a perianal fistulous tract. The urinary bladder contains a Bishop catheter and demonstrates significant debris and air. Pelvic organs: Unremarkable appearance of the prostate and seminal vesicles. Gastrointestinal: Left lower quadrant colostomy. Peritoneum/retroperitoneum: Normal. Lymph nodes: Normal. Vessels:  Aortoiliac atherosclerotic disease. Bones/Soft tissues: No aggressive osseous lesion. Contrast injected through the Bishop catheter into the bladder extravasates through a large defect within the posterior bladder wall, fills the presacral space and approaches an apparent perianal fistulous tract. CT ABD/PEL FOR RENAL STONE    Result Date: 9/15/2021  CT ABDOMEN AND PELVIS INDICATION:  Renal failure, hydronephrosis. History of rectal cancer. Multiple axial images were obtained through the abdomen and pelvis without intravenous or oral contrast.  Radiation dose reduction techniques were used for this study: All CT scans performed at this facility use one or all of the following: Automated exposure control, adjustment of the mA and/or kVp according to patient's size, iterative reconstruction. COMPARISON: 07/23/2021 FINDINGS: - KIDNEYS/URETERS: There are several small nonobstructing stones in the right kidney. There is bilateral hydroureteronephrosis, right greater than left. Ureters are dilated into the pelvis. No definite obstructing stone is seen. Distal ureters are poorly visualized. - BLADDER: There is a Bishop catheter in place. There is high attenuation material in the bladder, probably hemorrhage. Air is also present in the bladder. There is an apparent defect in the posterior wall the bladder which communicates with a complex mass/fluid collection. Multiple bubbles of air are present which may be extraluminal. - LUNG BASES: No infiltrates or masses. - LIVER: Normal in size and appearance. - GALLBLADDER/BILE DUCTS: No gallstones or bile duct dilatation. - PANCREAS: Normal. - SPLEEN: Normal. - ADRENALS: Normal. - REPRODUCTIVE ORGANS: No pelvic masses. - BOWEL: Post resection of the distal colon. Left lower quadrant colostomy is seen.   There is no significant bowel distention. - LYMPH NODES: No significant retroperitoneal, mesenteric, or pelvic adenopathy. - BONES: No fracture or significant bone lesion. - VASCULATURE: Moderate atherosclerosis. - OTHER: No ascites. 1.  Bilateral hydronephrosis, right greater than left. Tumor/inflammatory process in the pelvis is likely producing at least partial obstruction of the distal ureters. 2.  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 Consider follow-up pelvis CT with oral contrast and bladder contrast by Bishop. ** If there are any questions about this report, I can be reached on [employer] or at [phone] **      Current Meds:  Current Facility-Administered Medications   Medication Dose Route Frequency    cefTRIAXone (ROCEPHIN) 1 g in 0.9% sodium chloride (MBP/ADV) 50 mL MBP  1 g IntraVENous Q24H    lactated Ringers infusion  75 mL/hr IntraVENous CONTINUOUS    midazolam (VERSED) injection 0.5-2 mg  0.5-2 mg IntraVENous Multiple    sodium chloride (NS) flush 5-40 mL  5-40 mL IntraVENous Q8H    sodium chloride (NS) flush 5-40 mL  5-40 mL IntraVENous PRN    acetaminophen (TYLENOL) tablet 650 mg  650 mg Oral Q6H PRN    Or    acetaminophen (TYLENOL) suppository 650 mg  650 mg Rectal Q6H PRN    polyethylene glycol (MIRALAX) packet 17 g  17 g Oral DAILY PRN    ondansetron (ZOFRAN ODT) tablet 4 mg  4 mg Oral Q8H PRN    Or    ondansetron (ZOFRAN) injection 4 mg  4 mg IntraVENous Q6H PRN    magnesium sulfate 2 g/50 ml IVPB (premix or compounded)  2 g IntraVENous PRN    famotidine (PEPCID) tablet 20 mg  20 mg Oral DAILY    amitriptyline (ELAVIL) tablet 100 mg  100 mg Oral QHS    clonazePAM (KlonoPIN) tablet 2 mg  2 mg Oral QHS    levothyroxine (SYNTHROID) tablet 125 mcg  125 mcg Oral ACB    OLANZapine (ZyPREXA) tablet 7.5 mg  7.5 mg Oral QHS    hydrALAZINE (APRESOLINE) tablet 50 mg  50 mg Oral Q6H PRN       Signed:  Schuyler Blackmon NP    Part of this note may have been written by using a voice dictation software.   The note has been proof read but may still contain some grammatical/other typographical errors.

## 2021-09-16 NOTE — PROCEDURES
Department of Interventional Radiology  [phone]        Interventional Radiology Brief Procedure Note    Patient: Catrachito Calderon MRN: [id number]  SSN: [id number]    YOB: 1945  Age: 66 y.o. Sex: male      Date of Procedure: 9/16/2021    Pre-Procedure Diagnosis: Bi Hydronephrosis. Bladder leak. Hematuria. Post-Procedure Diagnosis: SAME    Procedure(s): Bi Percutaneous Nephrostomy Tube Placement    Brief Description of Procedure: as above    Performed By: Tanya Rodriguez MD     Assistants: None    Anesthesia:Moderate Sedation    Estimated Blood Loss: Less than 10ml    Specimens:  Microbiology    Implants: All Purpose Drain x 2    Findings: R >> L Hydronephrosis. Left urine is bloody. Complications: None    Recommendations: Gravity bag. Follow Up: 6 weeks.       Signed By: Tanya Rodriguez MD     September 16, 2021

## 2021-09-16 NOTE — PROGRESS NOTES
Pt NPO but has orders for PO medication. MD notified. New orders received. All needs met at this time.

## 2021-09-16 NOTE — PROGRESS NOTES
OT orders received, chart reviewed, and evaluation attempted. RN requested to defer until after upcoming procedure. Will follow up as schedule/ pt's status allows.     Cedric Bonds, OT

## 2021-09-16 NOTE — PROGRESS NOTES
TRANSFER - OUT REPORT:    Verbal report given to Ashlie Bustos RN on [address]  being transferred to IR prep room 1 for routine post - op       Report consisted of patients Situation, Background, Assessment and   Recommendations(SBAR). Information from the following report(s) SBAR, Kardex, Procedure Summary and MAR was reviewed with the receiving nurse. Lines:   Peripheral IV 09/16/21 Right Forearm (Active)   Site Assessment Clean, dry, & intact 09/16/21 0823   Phlebitis Assessment 0 09/16/21 0823   Infiltration Assessment 0 09/16/21 0823   Dressing Status Clean, dry, & intact 09/16/21 0823   Dressing Type Tape;Transparent 09/16/21 0823   Hub Color/Line Status Blue; Infusing 09/16/21 9807        Opportunity for questions and clarification was provided. 6 week follow up to be made.

## 2021-09-16 NOTE — PROGRESS NOTES
Problem: Falls - Risk of  Goal: *Absence of Falls  Description: Document Mariana Plaza Fall Risk and appropriate interventions in the flowsheet. Outcome: Progressing Towards Goal  Note: Fall Risk Interventions:  Mobility Interventions: Communicate number of staff needed for ambulation/transfer, Patient to call before getting OOB         Medication Interventions: Evaluate medications/consider consulting pharmacy, Patient to call before getting OOB, Teach patient to arise slowly    Elimination Interventions: Call light in reach, Patient to call for help with toileting needs              Problem: Patient Education: Go to Patient Education Activity  Goal: Patient/Family Education  Outcome: Progressing Towards Goal  Note: Patient educated not to get out of bed without help. Patient voiced understanding.

## 2021-09-16 NOTE — PROGRESS NOTES
[facility] Progress Note      Admission Date: 9/15/2021   Subjective: In bed, comfortable.     About to go to IR    Objective:     Physical Exam:    Patient Vitals for the past 8 hrs:   BP Temp Pulse Resp SpO2   09/16/21 0740 132/78 97.7 °F (36.5 °C) 94 23 100 %   09/16/21 0312 130/69 99 °F (37.2 °C) 100 18 97 %   09/16/21 0146  99.2 °F (37.3 °C)         Gen: comfortable , NAD  HEENT: moist membranes  CV: S1, S2  Lungs: Clear bilaterally  Extem: no edema     Current Facility-Administered Medications   Medication Dose Route Frequency    cefTRIAXone (ROCEPHIN) 1 g in 0.9% sodium chloride (MBP/ADV) 50 mL MBP  1 g IntraVENous Q24H    sodium chloride (NS) flush 5-40 mL  5-40 mL IntraVENous Q8H    sodium chloride (NS) flush 5-40 mL  5-40 mL IntraVENous PRN    acetaminophen (TYLENOL) tablet 650 mg  650 mg Oral Q6H PRN    Or    acetaminophen (TYLENOL) suppository 650 mg  650 mg Rectal Q6H PRN    polyethylene glycol (MIRALAX) packet 17 g  17 g Oral DAILY PRN    ondansetron (ZOFRAN ODT) tablet 4 mg  4 mg Oral Q8H PRN    Or    ondansetron (ZOFRAN) injection 4 mg  4 mg IntraVENous Q6H PRN    magnesium sulfate 2 g/50 ml IVPB (premix or compounded)  2 g IntraVENous PRN    famotidine (PEPCID) tablet 20 mg  20 mg Oral DAILY    amitriptyline (ELAVIL) tablet 100 mg  100 mg Oral QHS    clonazePAM (KlonoPIN) tablet 2 mg  2 mg Oral QHS    levothyroxine (SYNTHROID) tablet 125 mcg  125 mcg Oral ACB    OLANZapine (ZyPREXA) tablet 7.5 mg  7.5 mg Oral QHS    sodium bicarbonate (8.4%) 150 mEq in dextrose 5% 1,000 mL infusion   IntraVENous CONTINUOUS    hydrALAZINE (APRESOLINE) tablet 50 mg  50 mg Oral Q6H PRN            Data Review:     LABS:   Recent Results (from the past 12 hour(s))   METABOLIC PANEL, BASIC    Collection Time: 09/16/21  3:55 AM   Result Value Ref Range    Sodium 142 136 - 145 mmol/L    Potassium 4.8 3.5 - 5.1 mmol/L    Chloride 110 (H) 98 - 107 mmol/L    CO2 22 21 - 32 mmol/L    Anion gap 10 7 - 16 mmol/L    Glucose 132 (H) 65 - 100 mg/dL    BUN 74 (H) 8 - 23 MG/DL    Creatinine 3.85 (H) 0.8 - 1.5 MG/DL    GFR est AA 20 (L) >60 ml/min/1.73m2    GFR est non-AA 16 (L) >60 ml/min/1.73m2    Calcium 8.5 8.3 - 10.4 MG/DL   CBC WITH AUTOMATED DIFF    Collection Time: 09/16/21  3:55 AM   Result Value Ref Range    WBC 19.9 (H) 4.3 - 11.1 K/uL    RBC 3.16 (L) 4.23 - 5.6 M/uL    HGB 8.7 (L) 13.6 - 17.2 g/dL    HCT 27.8 (L) 41.1 - 50.3 %    MCV 88.0 79.6 - 97.8 FL    MCH 27.5 26.1 - 32.9 PG    MCHC 31.3 (L) 31.4 - 35.0 g/dL    RDW 14.2 11.9 - 14.6 %    PLATELET 283 771 - 772 K/uL    MPV 9.1 (L) 9.4 - 12.3 FL    ABSOLUTE NRBC 0.00 0.0 - 0.2 K/uL    NEUTROPHILS 87 (H) 43 - 78 %    LYMPHOCYTES 2 (L) 13 - 44 %    MONOCYTES 8 4.0 - 12.0 %    EOSINOPHILS 0 (L) 0.5 - 7.8 %    BASOPHILS 0 0.0 - 2.0 %    IMMATURE GRANULOCYTES 3 0.0 - 5.0 %    ABS. NEUTROPHILS 17.3 (H) 1.7 - 8.2 K/UL    ABS. LYMPHOCYTES 0.4 (L) 0.5 - 4.6 K/UL    ABS. MONOCYTES 1.6 (H) 0.1 - 1.3 K/UL    ABS. EOSINOPHILS 0.0 0.0 - 0.8 K/UL    ABS. BASOPHILS 0.0 0.0 - 0.2 K/UL    ABS. IMM. GRANS. 0.6 (H) 0.0 - 0.5 K/UL    RBC COMMENTS NORMOCYTIC/NORMOCHROMIC      PLATELET COMMENTS ADEQUATE      DF AUTOMATED           Plan:     Principal Problem:    RE[facility] ([facility]) ([facility] (9/15/2021)    Active Problems:    Hypothyroid (6/14/2019)      Overview: stable w/med      Bilateral hydronephrosis (5/11/2021)      Hyperkalemia (9/15/2021)      Hematuria (9/15/2021)      Urinary retention (9/15/2021)          REBEKAH with normal baseline  Recent surgery with proctecomy, rectal cancer  - some hx of neurogenic bladder  - CT cystogram  showedextravasation of urine through a posterior defect. - has brito and planning bilateral nephrostomy  - creatinine is better.      Can change IVF back to LR.   Stop lokelma as potassium is better

## 2021-09-17 ENCOUNTER — HOME HEALTH ADMISSION (OUTPATIENT)
Dept: HOME HEALTH SERVICES | Facility: HOME HEALTH | Age: 76
End: 2021-09-17
Payer: MEDICARE

## 2021-09-17 LAB
ANION GAP SERPL CALC-SCNC: 7 MMOL/L (ref 7–16)
BUN SERPL-MCNC: 57 MG/DL (ref 8–23)
CALCIUM SERPL-MCNC: 8.1 MG/DL (ref 8.3–10.4)
CHLORIDE SERPL-SCNC: 110 MMOL/L (ref 98–107)
CO2 SERPL-SCNC: 27 MMOL/L (ref 21–32)
CREAT SERPL-MCNC: 2.75 MG/DL (ref 0.8–1.5)
ERYTHROCYTE [DISTWIDTH] IN BLOOD BY AUTOMATED COUNT: 14.1 % (ref 11.9–14.6)
GLUCOSE SERPL-MCNC: 106 MG/DL (ref 65–100)
HCT VFR BLD AUTO: 25.5 % (ref 41.1–50.3)
HGB BLD-MCNC: 8 G/DL (ref 13.6–17.2)
MCH RBC QN AUTO: 27.2 PG (ref 26.1–32.9)
MCHC RBC AUTO-ENTMCNC: 31.4 G/DL (ref 31.4–35)
MCV RBC AUTO: 86.7 FL (ref 79.6–97.8)
NRBC # BLD: 0 K/UL (ref 0–0.2)
PLATELET # BLD AUTO: 249 K/UL (ref 150–450)
PMV BLD AUTO: 9.3 FL (ref 9.4–12.3)
POTASSIUM SERPL-SCNC: 3.8 MMOL/L (ref 3.5–5.1)
RBC # BLD AUTO: 2.94 M/UL (ref 4.23–5.6)
SODIUM SERPL-SCNC: 144 MMOL/L (ref 136–145)
WBC # BLD AUTO: 16.5 K/UL (ref 4.3–11.1)

## 2021-09-17 PROCEDURE — 36415 COLL VENOUS BLD VENIPUNCTURE: CPT

## 2021-09-17 PROCEDURE — 97116 GAIT TRAINING THERAPY: CPT

## 2021-09-17 PROCEDURE — 80048 BASIC METABOLIC PNL TOTAL CA: CPT

## 2021-09-17 PROCEDURE — 97161 PT EVAL LOW COMPLEX 20 MIN: CPT

## 2021-09-17 PROCEDURE — 74011250636 HC RX REV CODE- 250/636: Performed by: INTERNAL MEDICINE

## 2021-09-17 PROCEDURE — 74011250637 HC RX REV CODE- 250/637: Performed by: HOSPITALIST

## 2021-09-17 PROCEDURE — 74011250636 HC RX REV CODE- 250/636: Performed by: NURSE PRACTITIONER

## 2021-09-17 PROCEDURE — 99232 SBSQ HOSP IP/OBS MODERATE 35: CPT | Performed by: UROLOGY

## 2021-09-17 PROCEDURE — 74011000258 HC RX REV CODE- 258: Performed by: NURSE PRACTITIONER

## 2021-09-17 PROCEDURE — 97535 SELF CARE MNGMENT TRAINING: CPT

## 2021-09-17 PROCEDURE — 2709999900 HC NON-CHARGEABLE SUPPLY

## 2021-09-17 PROCEDURE — 65660000000 HC RM CCU STEPDOWN

## 2021-09-17 PROCEDURE — 85027 COMPLETE CBC AUTOMATED: CPT

## 2021-09-17 PROCEDURE — 97166 OT EVAL MOD COMPLEX 45 MIN: CPT

## 2021-09-17 RX ORDER — GUAIFENESIN/DEXTROMETHORPHAN 100-10MG/5
5 SYRUP ORAL
Status: DISCONTINUED | OUTPATIENT
Start: 2021-09-17 | End: 2021-09-17

## 2021-09-17 RX ADMIN — CLONAZEPAM 2 MG: 1 TABLET ORAL at 21:11

## 2021-09-17 RX ADMIN — SODIUM CHLORIDE, SODIUM LACTATE, POTASSIUM CHLORIDE, AND CALCIUM CHLORIDE 75 ML/HR: 600; 310; 30; 20 INJECTION, SOLUTION INTRAVENOUS at 16:57

## 2021-09-17 RX ADMIN — SODIUM CHLORIDE, SODIUM LACTATE, POTASSIUM CHLORIDE, AND CALCIUM CHLORIDE 75 ML/HR: 600; 310; 30; 20 INJECTION, SOLUTION INTRAVENOUS at 04:42

## 2021-09-17 RX ADMIN — LEVOTHYROXINE SODIUM 125 MCG: 0.12 TABLET ORAL at 05:16

## 2021-09-17 RX ADMIN — Medication 10 ML: at 21:11

## 2021-09-17 RX ADMIN — CEFTRIAXONE 1 G: 1 INJECTION, POWDER, FOR SOLUTION INTRAMUSCULAR; INTRAVENOUS at 08:40

## 2021-09-17 RX ADMIN — FAMOTIDINE 20 MG: 20 TABLET, FILM COATED ORAL at 08:40

## 2021-09-17 RX ADMIN — OLANZAPINE 7.5 MG: 5 TABLET, FILM COATED ORAL at 21:11

## 2021-09-17 RX ADMIN — Medication 10 ML: at 16:58

## 2021-09-17 RX ADMIN — AMITRIPTYLINE HYDROCHLORIDE 100 MG: 50 TABLET, FILM COATED ORAL at 21:13

## 2021-09-17 NOTE — PROGRESS NOTES
Admit Date: 9/15/2021    Subjective:     Matias Marroquin is resting well. Denies pain. Bilateral NT in place with clear urine output, slight pink tinge on the R. Brito in place with dark luigi urine OP. DIANE drain in RLE out and perineal staples coming out per RN. Labs improving. Objective:     Patient Vitals for the past 8 hrs:   BP Temp Pulse Resp SpO2   09/17/21 0740 138/77 -- (!) 102 -- --   09/17/21 0725 -- 98.7 °F (37.1 °C) (!) 102 15 93 %   09/17/21 0245 (!) 140/68 99.4 °F (37.4 °C) (!) 109 17 94 %     No intake/output data recorded.   09/15 1901 - 09/17 0700  In: 949 [I.V.:949]  Out: 5235 [Urine:5200; Drains:35]    Physical Exam:  GENERAL: alert, cooperative, no distress  LUNG: clear to auscultation bilaterally  HEART: regular rate and rhythm, S1, S2   ABDOMEN: soft, non-tender, colostomy with stool  NEUROLOGIC: AOx3  : B NT/brito catheter in place      Data Review   Recent Results (from the past 24 hour(s))   CULTURE, URINE    Collection Time: 09/16/21 10:58 AM    Specimen: Kidney    URINE   Result Value Ref Range    Special Requests: LEFT      Culture result: (A)       50,000-100,000 COLONIES/mL GRAM NEGATIVE RODS IDENTIFICATION AND SUSCEPTIBILITY TO FOLLOW    Culture result: CULTURE IN PROGRESS,FURTHER UPDATES TO FOLLOW     CULTURE, URINE    Collection Time: 09/16/21 11:08 AM    Specimen: Kidney    URINE   Result Value Ref Range    Special Requests: RIGHT      Culture result: NO GROWTH 1 DAY     CBC W/O DIFF    Collection Time: 09/17/21  3:42 AM   Result Value Ref Range    WBC 16.5 (H) 4.3 - 11.1 K/uL    RBC 2.94 (L) 4.23 - 5.6 M/uL    HGB 8.0 (L) 13.6 - 17.2 g/dL    HCT 25.5 (L) 41.1 - 50.3 %    MCV 86.7 79.6 - 97.8 FL    MCH 27.2 26.1 - 32.9 PG    MCHC 31.4 31.4 - 35.0 g/dL    RDW 14.1 11.9 - 14.6 %    PLATELET 382 604 - 699 K/uL    MPV 9.3 (L) 9.4 - 12.3 FL    ABSOLUTE NRBC 0.00 0.0 - 0.2 K/uL   METABOLIC PANEL, BASIC    Collection Time: 09/17/21  3:42 AM   Result Value Ref Range    Sodium 144 136 - 145 mmol/L    Potassium 3.8 3.5 - 5.1 mmol/L    Chloride 110 (H) 98 - 107 mmol/L    CO2 27 21 - 32 mmol/L    Anion gap 7 7 - 16 mmol/L    Glucose 106 (H) 65 - 100 mg/dL    BUN 57 (H) 8 - 23 MG/DL    Creatinine 2.75 (H) 0.8 - 1.5 MG/DL    GFR est AA 29 (L) >60 ml/min/1.73m2    GFR est non-AA 24 (L) >60 ml/min/1.73m2    Calcium 8.1 (L) 8.3 - 10.4 MG/DL       Assessment:     Principal Problem:    REBEKAH (acute kidney injury) ([facility] [address] (9/15/2021)    Active Problems:    Hypothyroid (6/14/2019)      Overview: stable w/med      Bilateral hydronephrosis (5/11/2021)      Hyperkalemia (9/15/2021)      Hematuria (9/15/2021)      Urinary retention (9/15/2021)      66 y.o. male with a past medical hx of kidney stones, pelvic abscess and urinary retention who had a completion proctectomy by Dr. Margie Chaparro. A cystotomy occurred during surgery and was repaired intra-operatively by Dr. Margie Chaparro. He presented for follow-up on 9/10 with cystogram performed in office showing no bladder injury with complete healing of repair and L grade 3 vesico-ureteral reflux. He presents to ER 9/15 with c/o fatigue and weakness. Cn found to be 6.09 (0.9 on 8/31). Brito placed with 1L of blood tinged UOP. Per notes, RN manually irrigated brito. Renal US shows bladder collapsed with brito in place with possible sediment in bladder and bilateral hydro with R>L with bilateral renal stones. We are consulted for urinary retention,  Bilateral hydronephrosis, gross hematuria. CT AP shows R>L hydro with inflammatory process in pelvis producing partial obs of ureters, probable defect in posterior bladder. CT cystogram shows extravasation  through a large defect within the posterior bladder wall, fills the presacral space and approaches an apparent perianal fistulous tract. Had fever last night up to 102.7. UA with >100 WBC, >100 RBC, trace bacteria, urine culture added on, WBC 19.9.    9/17- labs improving, UC growing GNR, on rocephin.   SP bilateral NT placement 9/16. Afebrile. Plan:     Continue bilateral NT to drainage. Continue brito catheter to drainage. Continue IV rocephin. Follow up cultures. Dr. Rina Brown notified of Lodema Crumbly falling out and perineal staples coming out. He will see pt later today. Tanika Thomas NP  [facility]    Urology Attending Physician Note:     Admit Date: 9/15/2021    Subjective:     Afebrile. Cr trending down. Bilateral NTs and catheter draining. No additional complaints. Objective:     Patient Vitals for the past 8 hrs:   BP Temp Pulse Resp SpO2   09/17/21 1205 129/72 98.3 °F (36.8 °C) 91 18 96 %   09/17/21 0740 138/77 -- (!) 102 -- --   09/17/21 0725 -- 98.7 °F (37.1 °C) (!) 102 15 93 %     No intake/output data recorded.   09/15 1901 - 09/17 0700  In: 0 [I.V.:949]  Out: 5235 [Urine:5200; Drains:35]    Physical Exam:   Visit Vitals  /72 (BP 1 Location: Left upper arm)   Pulse 91   Temp 98.3 °F (36.8 °C)   Resp 18   Ht 5' 8\" (1.727 m)   Wt 125 lb (56.7 kg)   SpO2 96%   BMI 19.01 kg/m²        GENERAL: No acute distress, Awake, Alert, Oriented X 3  CARDIAC: regular rate and rhythm  CHEST AND LUNG: Easy work of breathing,  ABDOMEN: soft, non tender, non-distended, midline incision with steris, colostomy with stool  Back: Bilateral NTs draining clear yellow urine  : Brito with luigi urine           Data Review   Recent Results (from the past 24 hour(s))   CBC W/O DIFF    Collection Time: 09/17/21  3:42 AM   Result Value Ref Range    WBC 16.5 (H) 4.3 - 11.1 K/uL    RBC 2.94 (L) 4.23 - 5.6 M/uL    HGB 8.0 (L) 13.6 - 17.2 g/dL    HCT 25.5 (L) 41.1 - 50.3 %    MCV 86.7 79.6 - 97.8 FL    MCH 27.2 26.1 - 32.9 PG    MCHC 31.4 31.4 - 35.0 g/dL    RDW 14.1 11.9 - 14.6 %    PLATELET 686 490 - 739 K/uL    MPV 9.3 (L) 9.4 - 12.3 FL    ABSOLUTE NRBC 0.00 0.0 - 0.2 K/uL   METABOLIC PANEL, BASIC    Collection Time: 09/17/21  3:42 AM   Result Value Ref Range    Sodium 144 136 - 145 mmol/L Potassium 3.8 3.5 - 5.1 mmol/L    Chloride 110 (H) 98 - 107 mmol/L    CO2 27 21 - 32 mmol/L    Anion gap 7 7 - 16 mmol/L    Glucose 106 (H) 65 - 100 mg/dL    BUN 57 (H) 8 - 23 MG/DL    Creatinine 2.75 (H) 0.8 - 1.5 MG/DL    GFR est AA 29 (L) >60 ml/min/1.73m2    GFR est non-AA 24 (L) >60 ml/min/1.73m2    Calcium 8.1 (L) 8.3 - 10.4 MG/DL           Assessment:     Principal Problem:    REBEKAH (acute kidney injury) ([facility] [address] (9/15/2021)    Active Problems:    Hypothyroid (6/14/2019)      Overview: stable w/med      Bilateral hydronephrosis (5/11/2021)      Hyperkalemia (9/15/2021)      Hematuria (9/15/2021)      Urinary retention (9/15/2021)      Improving with max urinary drainage. Continue to monitor    Plan:     -Leave tubes to drainage  -Trend labs  -Continue antibiotics  -No additional surgical intervention at this time  -Will follow       In addition to my documentation above, I have also reviewed the nurse practitioner note and personally examined the patient. I agree with the HPI, exam, assessment and plan. Jack Floyd M.D.     [facility]  [address] [facility]  Phone: [phone]  Fax: [phone]

## 2021-09-17 NOTE — PROGRESS NOTES
END OF SHIFT NOTE:    INTAKE/OUTPUT  09/16 0701 - 09/17 0700  In: 693 [I.V.:693]  Out: 2975 [Urine:2950; Drains:25]  Voiding: NO  Catheter: YES  Drain:   Xavier-Schaefer Drain Right Leg (Active)   Site Assessment Clean, dry, & intact 09/16/21 0823   Dressing Status Clean, dry, & intact 09/16/21 0823   Status Patent; Charged 09/16/21 0823   Drainage Color Serous 09/16/21 0823   Output (ml) 25 ml 09/16/21 1243       Nephrostomy Tube 09/16/21 Flank (Active)   Site Assessment Clean, dry, & intact 09/17/21 0708   Dressing Status Clean, dry, & intact 09/17/21 0708   Drainage Description Serosanguinous 09/17/21 0708   Status Patent;Draining 09/17/21 0708   Urine Output (mL) 175 ml 09/17/21 0503       Nephrostomy Tube 09/16/21 Flank (Active)   Site Assessment Clean, dry, & intact 09/17/21 0708   Dressing Status Clean, dry, & intact 09/17/21 0708   Drainage Description Serosanguinous 09/17/21 0708   Status Draining;Patent 09/17/21 0708   Urine Output (mL) 125 ml 09/17/21 0503               Flatus: Patient does have flatus present. Stool:  1 occurrences. Characteristics:  Stool Assessment  Stool Color: Brown  Stool Appearance: Loose  Stool Amount: Small  Stool Source/Status: Colostomy    Emesis: 0 occurrences. Characteristics:        VITAL SIGNS  Patient Vitals for the past 12 hrs:   Temp Pulse Resp BP SpO2   09/17/21 0740  (!) 102  138/77    09/17/21 0725 98.7 °F (37.1 °C) (!) 102 15  93 %   09/17/21 0245 99.4 °F (37.4 °C) (!) 109 17 (!) 140/68 94 %   09/16/21 2225 99.9 °F (37.7 °C) (!) 106 18 130/72 93 %       Pain Assessment  Pain Intensity 1: 0 (09/17/21 0725)        Patient Stated Pain Goal: 0    Ambulating  No    Shift report given to oncoming nurse at the bedside.     Gwen Law RN

## 2021-09-17 NOTE — PROGRESS NOTES
Physician Progress Note      PATIENTDavey Ball  CSN #:                  [id number]  :                       1945  ADMIT DATE:       9/15/2021 7:03 AM  DISCH DATE:  RESPONDING  PROVIDER #:        Harleen De Souza NP          QUERY TEXT:    Pt admitted with REBEKAH and UTI. Pt noted to have elevated WBC and tachycardia. If possible, please document in the progress notes and discharge summary if you are evaluating and /or treating any of the following: The medical record reflects the following:  Risk Factors: UTI  Clinical Indicators: WBC 11.2, 19.9, 16.5. Tachycardia. Tmax 102.7  Treatment: Rocephin. Tylenol  Options provided:  -- Sepsis, present on admission  -- UTI  Sepsis  -- Other - I will add my own diagnosis  -- Disagree - Not applicable / Not valid  -- Disagree - Clinically unable to determine / Unknown  -- Refer to Clinical Documentation Reviewer    PROVIDER RESPONSE TEXT:    Provider disagreed with this query.     Query created by: Jason Cano on 2021 1:27 PM      Electronically signed by:  Harleen De Souza NP 2021 1:35 PM

## 2021-09-17 NOTE — PROGRESS NOTES
Pt off of heart monitor. When assessed, pt had pulled out R leg DIANE drain. Placed pt back on monitor. MD notified. No new orders at this time. Reinforced R leg dressing. All needs met at this time.

## 2021-09-17 NOTE — PROGRESS NOTES
ACUTE PHYSICAL THERAPY GOALS:  (Developed with and agreed upon by patient and/or caregiver.)    (1.) Nola Mayo  will move from supine to sit and sit to supine  with INDEPENDENCE within 7 treatment day(s). (2.) Nola Mayo will transfer from bed to chair and chair to bed with MODIFIED INDEPENDENCE using the least restrictive device within 7 treatment day(s). (3.) Garland Marroquin will ambulate with MODIFIED INDEPENDENCE for 500 feet with the least restrictive device within 7 treatment day(s). (4.) Nola Mayo will perform standing static and dynamic balance activities x 10 minutes with SUPERVISION to improve safety within 7 treatment day(s). (5.) Nola Mayo will ascend and descend 1 step using B hand rail(s) with INDEPENDENT to improve functional mobility and safety within 7 treatment day(s). (6.) Nola Mayo will perform bilateral lower extremity exercises x 10 min for HEP with INDEPENDENCE to improve strength, endurance, and functional mobility within 7 treatment day(s).      PHYSICAL THERAPY ASSESSMENT: Initial Assessment, Daily Note and PM PT Treatment Day # 1      Nola Mayo is a 66 y.o. male   PRIMARY DIAGNOSIS: REBEKAH (acute kidney injury) ([facility] [address]  REBEKAH (acute kidney injury) ([facility] [address] [N17.9]  Hyperkalemia [E87.5]  Bilateral hydronephrosis [N13.30]  Hematuria [R31.9]       Reason for Referral:    ICD-10: Treatment Diagnosis: Generalized Muscle Weakness (M62.81)  Other abnormalities of gait and mobility (R26.89)  History of falling (Z91.81)  [place]PAT[place] / [place] PART [place] B / Product Type: Medicare /     ASSESSMENT:     REHAB RECOMMENDATIONS:   Recommendation to date pending progress:  [address] [place] [address]  Equipment:    Rolling Walker     PRIOR LEVEL OF FUNCTION:  (Prior to Hospitalization) INITIAL/CURRENT LEVEL OF FUNCTION:  (Most Recently Demonstrated)   Bed Mobility:   Independent  Sit to Stand:   Independent  Transfers:   Independent  Gait/Mobility:   Independent Bed Mobility:   Minimal Assistance  Sit to Stand:   Independent  Transfers:   Standby Assistance  Gait/Mobility:   Standby Assistance     ASSESSMENT:  Mr. Alec Brenner  is a 66year old M who presents with REBEKAH. Pt did report that he has had 4 falls in the last year. This date pt performs mobility including bed mobility with Jeanie. Pt was eager to walk and ambulated 250 ft and 200 ft x 2 with SBA. Ambulation attempted with and without RW, pt does better and is more steady with RW. Pt was strongly encouraged to use RW at home for safety and fall prevention. Pt presents as functioning below his baseline, with deficits in mobility including transfers, gait, balance, and activity tolerance. Pt will benefit from skilled therapy services to address stated deficits to promote return to highest level of function, independence, and safety. D/C recommendation: HH PT with assistance at home. Pt says he has 3 friends that will be checking in on him consistently. Will continue to follow. SUBJECTIVE:   Mr. Alec Brenner states, \"I am feeling better today. \"    SOCIAL HISTORY/LIVING ENVIRONMENT: lives in [facility] alone, 1 step to enter. Has 3 friends who will be helping him at home.    Home Environment: Private residence  # Steps to Enter: 0  One/Two Story Residence: Two story  Living Alone: Yes  Support Systems: Friend/Neighbor  OBJECTIVE:     PAIN: VITAL SIGNS: LINES/DRAINS:   Pre Treatment: Pain Screen  Pain Scale 1: Numeric (0 - 10)  Pain Intensity 1: 0  Post Treatment: 0   Bishop Catheter and IV  O2 Device: None (Room air)     GROSS EVALUATION:  B LE Within Functional Limits Abnormal/ Functional Abnormal/ Non-Functional (see comments) Not Tested Comments:   AROM [x] [] [] []    PROM [x] [] [] []    Strength [x] [] [] []    Balance [] [x] [] []    Posture [] [x] [] []    Sensation [] [] [] [x]    Coordination [] [] [] [x]    Tone [] [] [] [x]    Edema [] [] [] [x]    Activity Tolerance [] [x] [] []     [] [] [] []      COGNITION/  PERCEPTION: Intact Impaired   (see comments) Comments:   Orientation [x] []    Vision [x] []    Hearing [x] []    Command Following [x] []    Safety Awareness [x] []     [] []      MOBILITY: I Mod I S SBA CGA Min Mod Max Total  NT x2 Comments:   Bed Mobility    Rolling [x] [] [] [] [] [] [] [] [] [] []    Supine to Sit [] [] [] [] [] [x] [] [] [] [] []    Scooting [x] [] [] [] [] [] [] [] [] [] []    Sit to Supine [x] [] [] [] [] [] [] [] [] [] []    Transfers    Sit to Stand [x] [] [] [] [] [] [] [] [] [] []    Bed to Chair [] [] [] [] [] [] [] [] [] [x] []    Stand to Sit [x] [] [] [] [] [] [] [] [] [] []    I=Independent, Mod I=Modified Independent, S=Supervision, SBA=Standby Assistance, CGA=Contact Guard Assistance,   Min=Minimal Assistance, Mod=Moderate Assistance, Max=Maximal Assistance, Total=Total Assistance, NT=Not Tested  GAIT: I Mod I S SBA CGA Min Mod Max Total  NT x2 Comments:   Level of Assistance [] [] [] [x] [] [] [] [] [] [] []    Distance 250 ft, 200 ft x 2    DME Rolling Walker and Gait Belt    Gait Quality Narrow base of support, sort shuffled steps. Good gait speed. Better balance with RW    Weightbearing Status N/A     I=Independent, Mod I=Modified Independent, S=Supervision, SBA=Standby Assistance, CGA=Contact Guard Assistance,   Min=Minimal Assistance, Mod=Moderate Assistance, Max=Maximal Assistance, Total=Total Assistance, NT=Not [facility]       How much difficulty does the patient currently have. .. Unable A Lot A Little None   1. Turning over in bed (including adjusting bedclothes, sheets and blankets)? [] 1   [] 2   [] 3   [x] 4   2. Sitting down on and standing up from a chair with arms ( e.g., wheelchair, bedside commode, etc.)   [] 1   [] 2   [] 3   [x] 4   3. Moving from lying on back to sitting on the side of the bed?    [] 1   [] 2 [x] 3   [] 4   How much help from another person does the patient currently need. .. Total A Lot A Little None   4. Moving to and from a bed to a chair (including a wheelchair)? [] 1   [] 2   [] 3   [x] 4   5. Need to walk in hospital room? [] 1   [] 2   [x] 3   [] 4   6. Climbing 3-5 steps with a railing? [] 1   [] 2   [x] 3   [] 4   © , Trustees of [address] [place] [address] [address] [address], under license to [employer]. All rights reserved     Score:  Initial: 21 Most Recent: X (Date: -- )    Interpretation of Tool:  Represents activities that are increasingly more difficult (i.e. Bed mobility, Transfers, Gait). PLAN:   FREQUENCY/DURATION: PT Plan of Care: 3 times/week for duration of hospital stay or until stated goals are met, whichever comes first.    PROBLEM LIST:   (Skilled intervention is medically necessary to address:)  1. Decreased ADL/Functional Activities  2. Decreased Activity Tolerance  3. Decreased Balance  4. Decreased Gait Ability   INTERVENTIONS PLANNED:   (Benefits and precautions of physical therapy have been discussed with the patient.)  1. Therapeutic Activity  2. Therapeutic Exercise/HEP  3. Neuromuscular Re-education  4. Gait Training  5. Education     TREATMENT:     EVALUATION: Low Complexity : (Untimed Charge)    TREATMENT:   ($$ Gait Trainin-37 mins    )  Gait Training (25 Minutes): Gait training for 250+ feet utilizing 5 [facility] and [employer]. Patient required Tactile, Verbal and Visual cueing to improve Activity Pacing, Assistive Device Utilization, Dynamic Standing Balance and Gait Mechanics.      TREATMENT GRID:  N/A    AFTER TREATMENT POSITION/PRECAUTIONS:  Bed, Needs within reach and RN notified    INTERDISCIPLINARY COLLABORATION:  RN/PCT    TOTAL TREATMENT DURATION:  PT Patient Time In/Time Out  Time In: 541  Time Out: 1000 [address], PT

## 2021-09-17 NOTE — PROGRESS NOTES
Chart review complete, per PT staff they are recommending home care for therapy pt is agreeable to at least one session with therapy after DC agreeable for referral to be made to [facility] order/referral completed for RN, PT, also RW recommended for home use but has has declined to get walker at this time. CM will remain available to assist as needed.

## 2021-09-17 NOTE — PROGRESS NOTES
[facility] Progress Note      Admission Date: 9/15/2021   Subjective:      Sitting up and alert  Had bilateral nephrostomy tubes yesterday    Objective:     Physical Exam:    Patient Vitals for the past 8 hrs:   BP Temp Pulse Resp SpO2   09/17/21 0740 138/77  (!) 102     09/17/21 0725  98.7 °F (37.1 °C) (!) 102 15 93 %   09/17/21 0245 (!) 140/68 99.4 °F (37.4 °C) (!) 109 17 94 %      Gen: comfortable , NAD  HEENT: moist membranes  CV: S1, S2  Lungs: Clear bilaterally  Extem: no edema     Current Facility-Administered Medications   Medication Dose Route Frequency    cefTRIAXone (ROCEPHIN) 1 g in 0.9% sodium chloride (MBP/ADV) 50 mL MBP  1 g IntraVENous Q24H    lactated Ringers infusion  75 mL/hr IntraVENous CONTINUOUS    midazolam (VERSED) injection 0.5-2 mg  0.5-2 mg IntraVENous Multiple    sodium chloride (NS) flush 5-40 mL  5-40 mL IntraVENous Q8H    sodium chloride (NS) flush 5-40 mL  5-40 mL IntraVENous PRN    acetaminophen (TYLENOL) tablet 650 mg  650 mg Oral Q6H PRN    Or    acetaminophen (TYLENOL) suppository 650 mg  650 mg Rectal Q6H PRN    polyethylene glycol (MIRALAX) packet 17 g  17 g Oral DAILY PRN    ondansetron (ZOFRAN ODT) tablet 4 mg  4 mg Oral Q8H PRN    Or    ondansetron (ZOFRAN) injection 4 mg  4 mg IntraVENous Q6H PRN    magnesium sulfate 2 g/50 ml IVPB (premix or compounded)  2 g IntraVENous PRN    famotidine (PEPCID) tablet 20 mg  20 mg Oral DAILY    amitriptyline (ELAVIL) tablet 100 mg  100 mg Oral QHS    clonazePAM (KlonoPIN) tablet 2 mg  2 mg Oral QHS    levothyroxine (SYNTHROID) tablet 125 mcg  125 mcg Oral ACB    OLANZapine (ZyPREXA) tablet 7.5 mg  7.5 mg Oral QHS    hydrALAZINE (APRESOLINE) tablet 50 mg  50 mg Oral Q6H PRN            Data Review:     LABS:   Recent Results (from the past 12 hour(s))   CBC W/O DIFF    Collection Time: 09/17/21  3:42 AM   Result Value Ref Range    WBC 16.5 (H) 4.3 - 11.1 K/uL    RBC 2.94 (L) 4.23 - 5.6 M/uL    HGB 8.0 (L) 13.6 - 17.2 g/dL    HCT 25.5 (L) 41.1 - 50.3 %    MCV 86.7 79.6 - 97.8 FL    MCH 27.2 26.1 - 32.9 PG    MCHC 31.4 31.4 - 35.0 g/dL    RDW 14.1 11.9 - 14.6 %    PLATELET 702 587 - 702 K/uL    MPV 9.3 (L) 9.4 - 12.3 FL    ABSOLUTE NRBC 0.00 0.0 - 0.2 K/uL   METABOLIC PANEL, BASIC    Collection Time: 09/17/21  3:42 AM   Result Value Ref Range    Sodium 144 136 - 145 mmol/L    Potassium 3.8 3.5 - 5.1 mmol/L    Chloride 110 (H) 98 - 107 mmol/L    CO2 27 21 - 32 mmol/L    Anion gap 7 7 - 16 mmol/L    Glucose 106 (H) 65 - 100 mg/dL    BUN 57 (H) 8 - 23 MG/DL    Creatinine 2.75 (H) 0.8 - 1.5 MG/DL    GFR est AA 29 (L) >60 ml/min/1.73m2    GFR est non-AA 24 (L) >60 ml/min/1.73m2    Calcium 8.1 (L) 8.3 - 10.4 MG/DL         Plan:     Principal Problem:    REBEKAH (acute kidney injury) ([facility] [address] (9/15/2021)    Active Problems:    Hypothyroid (6/14/2019)      Overview: stable w/med      Bilateral hydronephrosis (5/11/2021)      Hyperkalemia (9/15/2021)      Hematuria (9/15/2021)      Urinary retention (9/15/2021)          REBEKAH with normal baseline  Recent surgery with proctecomy, rectal cancer  - some hx of neurogenic bladder  - CT cystogram  Showed extravasation of urine through a posterior defect. - has brito bilateral nephrostomy  - creatinine and renal indices continue to improve  - hyperkalemia resolved    Attempting conservative therapy in lieu of surgical correction'   Expect kidney function to continue to improve. - will be available. As needed.

## 2021-09-17 NOTE — PROGRESS NOTES
ACUTE OT GOALS:  (Developed with and agreed upon by patient and/or caregiver.)  1. Pt will toilet independently  2. Pt will complete functional mobility for ADLs with mod I using AD as needed  3. Pt will complete lower body dressing with SBA using AE as needed  4. Pt will complete grooming and hygiene at sink independently  5. Pt will demonstrate independence with HEP to promote increased BUE strength and functional use for ADLs  6. Pt will tolerate 23 minutes functional activity with min or fewer rest breaks to promote increased endurance for ADLs  7.  Pt will independently demonstrate/ verbalize 2+ energy conservation techniques to promote increased endurance for ADLs      Timeframe: 7 days      OCCUPATIONAL THERAPY ASSESSMENT: Initial Assessment and Daily Note OT Treatment Day # 1    Anil Harper is a 66 y.o. male   PRIMARY DIAGNOSIS: REBEKAH (acute kidney injury) ([facility] [address]  REBEKAH (acute kidney injury) ([facility] [address] [N17.9]  Hyperkalemia [E87.5]  Bilateral hydronephrosis [N13.30]  Hematuria [R31.9]       Reason for Referral:  REBEKAH, weakness, fatigue  ICD-10: Treatment Diagnosis: Generalized Muscle Weakness (M62.81)  [place]PATIENT[place] / [place] PART [place] B / Product Type: Medicare /   ASSESSMENT:     REHAB RECOMMENDATIONS:   Recommendation to date pending progress:  [address] [place] [address]  Equipment:    3 in 1 Bedside Commode   Rolling Walker     PRIOR LEVEL OF FUNCTION:  (Prior to Hospitalization)  INITIAL/CURRENT LEVEL OF FUNCTION:  (Based on today's evaluation)   Bathing:   Independent  Dressing:   Independent  Feeding/Grooming:   Independent  Toileting:   Independent  Functional Mobility:   Independent Bathing:   Contact Guard Assistance  Dressing:   Contact Guard Assistance  Feeding/Grooming:   Contact Guard Assistance  Toileting:   Contact Guard Assistance  Functional Mobility:   Contact Guard Assistance     ASSESSMENT:  Mr. Essence Koo was admitted with c/o ongoing weakness and fatigue, REBEKAH, s/p B nephrostomy tubes yesterday. Pt presented generally weak with deficits in endurance, strength, mobility, and balance impacting ADLs. Pt demonstrated ADLs and mobility for ADLs with CGA overall using RW. Pt is below his functional baseline and would benefit from skilled  OT services to address deficits. SUBJECTIVE:   Mr. Loy Peters states, \"My legs are always the first thing to get weak. \"    SOCIAL HISTORY/LIVING ENVIRONMENT: Lives alone, independent, has supportive friends. 2L, tub shower, no DME, no falls.    Home Environment: Private residence  # Steps to Enter: 0  One/Two Story Residence: Two story  Living Alone: Yes  Support Systems: Friend/Neighbor    OBJECTIVE:     PAIN: VITAL SIGNS: LINES/DRAINS:   Pre Treatment: Pain Screen  Pain Scale 1: Numeric (0 - 10)  Pain Intensity 1: 0  Post Treatment: 0   Bishop Catheter, IV and B nephrostomy tubes, ostomy  O2 Device: None (Room air)     GROSS EVALUATION:  BUE Within Functional Limits Abnormal/ Functional Abnormal/ Non-Functional (see comments) Not Tested Comments:   AROM [x] [] [] []    PROM [] [] [] []    Strength [] [x] [] []    Balance [] [x] [] []    Posture [] [] [] []    Sensation [] [] [] []    Coordination [x] [] [] []    Tone [] [] [] []    Edema [] [] [] []    Activity Tolerance [] [x] [] []     [] [] [] []      COGNITION/  PERCEPTION: Intact Impaired   (see comments) Comments:   Orientation [x] []    Vision [x] []    Hearing [x] []    Judgment/ Insight [x] []    Attention [x] []    Memory [x] []    Command Following [x] []    Emotional Regulation [x] []     [] []      ACTIVITIES OF DAILY LIVING: I Mod I S SBA CGA Min Mod Max Total NT Comments   BASIC ADLs:              Bathing/ Showering [] [] [] [] [] [] [] [] [] []    Toileting [] [] [] [] [] [] [] [] [] []    Dressing [] [] [] [] [x] [] [] [] [] [] socks   Feeding [] [] [] [] [] [] [] [] [] []    Grooming [] [] [] [] [x] [] [] [] [] [] At sink   Personal Device Care [] [] [] [] [] [] [] [] [] []    Functional Mobility [] [] [] [] [x] [] [] [] [] [] W/ RW   I=Independent, Mod I=Modified Independent, S=Supervision, SBA=Standby Assistance, CGA=Contact Guard Assistance,   Min=Minimal Assistance, Mod=Moderate Assistance, Max=Maximal Assistance, Total=Total Assistance, NT=Not Tested    MOBILITY: I Mod I S SBA CGA Min Mod Max Total  NT x2 Comments:   Supine to sit [] [] [] [] [x] [] [] [] [] [] []    Sit to supine [] [] [] [] [] [] [] [] [] [] []    Sit to stand [] [] [] [] [x] [] [] [] [] [] []    Bed to chair [] [] [] [] [x] [] [] [] [] [] []    I=Independent, Mod I=Modified Independent, S=Supervision, SBA=Standby Assistance, CGA=Contact Guard Assistance,   Min=Minimal Assistance, Mod=Moderate Assistance, Max=Maximal Assistance, Total=Total Assistance, NT=Not [facility] 6 Clicks   Daily Activity Inpatient Short Form        How much help from another person does the patient currently need. .. Total A Lot A Little None   1. Putting on and taking off regular lower body clothing? [] 1   [] 2   [x] 3   [] 4   2. Bathing (including washing, rinsing, drying)? [] 1   [] 2   [x] 3   [] 4   3. Toileting, which includes using toilet, bedpan or urinal?   [] 1   [] 2   [x] 3   [] 4   4. Putting on and taking off regular upper body clothing? [] 1   [] 2   [] 3   [x] 4   5. Taking care of personal grooming such as brushing teeth? [] 1   [] 2   [] 3   [x] 4   6. Eating meals? [] 1   [] 2   [] 3   [x] 4   © 2007, [facility] [facility] of [address] [place] [address] [address] [address], under license to [employer]. All rights reserved     Score:  Initial: 21 Most Recent: X (Date: -- )   Interpretation of Tool:  Represents activities that are increasingly more difficult (i.e. Bed mobility, Transfers, Gait).     PLAN:   FREQUENCY/DURATION: OT Plan of Care: 3 times/week for duration of hospital stay or until stated goals are met, whichever comes first.    PROBLEM LIST:   (Skilled intervention is medically necessary to address:)  1. Decreased ADL/Functional Activities  2. Decreased Activity Tolerance  3. Decreased Balance  4. Decreased Strength   INTERVENTIONS PLANNED:   (Benefits and precautions of occupational therapy have been discussed with the patient.)  1. Self Care Training  2. Therapeutic Activity  3. Therapeutic Exercise/HEP  4. Neuromuscular Re-education  5. Education     TREATMENT:     EVALUATION: Moderate Complexity : (Untimed Charge)    TREATMENT:   ($$ Self Care/Home Management: 8-22 mins    )  Self Care (10 Minutes): Self care including Lower Body Dressing, Grooming and Energy Conservation Training to increase independence.     TREATMENT GRID:  N/A    AFTER TREATMENT POSITION/PRECAUTIONS:  Chair, Needs within reach and RN notified    INTERDISCIPLINARY COLLABORATION:  RN/PCT    TOTAL TREATMENT DURATION:  OT Patient Time In/Time Out  Time In: 1015  Time Out: [address] [facility]

## 2021-09-17 NOTE — PROGRESS NOTES
Hospitalist Progress Note   Admit Date:  9/15/2021  7:03 AM   Name:  Max Marroquin   Age:  66 y.o. Sex:  male  :  1945   MRN:  [id number]   Room:      Presenting Complaint: Fatigue    Reason(s) for Admission: REBEKAH (acute kidney injury) ([address] [N17.9]  Hyperkalemia [E87.5]  Bilateral hydronephrosis [N13.30]  Hematuria [R31.9]     Hospital Course & Interval History:   Mr. Tanmay Calhoun a 66 y. o. 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  He had a follow-up on 9/10 with cystogram showing no bladder injury.  Patient reports that he has not been able to urinate much but has urinary incontinence.  Creatinine was normal about 15 days ago.  Renal ultrasound was done in the ER and showed bilateral hydronephrosis with bilateral renal stones.  Bishop catheter was placed and showed return of 1 L of urine blood-tinged.  Urology and Nephrology consulted. Cystogram demonstrated a posterior bladder wall defect. Urine leak does look to only be in pelvic region and not in peritoneal space. IR placed bilat perc neph tubes on Sep/16 for maximal diversion away from the bladder due to wall defect. Subjective (21):  No AEO. Denies CP/SOB. Having trouble with DIANE drain; Urology has contacted Dr. Lauren Figueredo, Plastic surgery to evaluate. Neph tubes and Bishop draining. PT/OT to evaluate patient today. Assessment & Plan:     Principal Problem:  REBEKAH (acute kidney injury) ([address] (9/15/2021)  Creatinine 6 on admission.  Renal ultrasound shows bilateral hydronephrosis.  Bishop catheter placed in the ER. Urology consulted.  Appreciate recommendations.   CT abd/pel showed bilat hydronephrosis R > L.  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 Nephrology consulted.  Appreciate recommendations. Continue IV fluids  Sep/16: sCr 3.85; bilat perc neph tubes placed  Sep/17: sCr 2.75     Active Problems:  Bilateral hydronephrosis (2021)  Urology consulted.  Appreciate recommendations. Bishop catheter in place.     Posterior bladder wall defect  UTI  Imaging reviewed; urine leak ppears confined to pelvic region  Monitor temp, WBC  Bilat perc neph tubes placed Sep/16 by IR; f/u in 6 weeks. Cont ceftriaxone  May need open ex lap or cystoscopy in the near future if defect does not heal  Sep/17: Urine cx w/ GNR; awaiting final   - Blood cxs NG x 2 days     Hematuria  Urology on board  Monitor CBC  Bilat perc neph tubes placed Sep/16 by IR  Improving     Hyperkalemia, resolved  Continue to monitor     Moderate protein calorie malnutrition     Hypothyroidism  Levothyroxine     Anxiety  Continue home medications Elavil, Klonopin, olanzapine. Dispo/Discharge Plannin-2 midnights    Diet:  ADULT DIET Regular; Low Potassium (Less than 3000 mg/day);  Low Phosphorus (Less than 1000 mg); 60 to 80 gm  DVT PPx: SCD  Code status: Full Code    Hospital Problems as of 2021 Date Reviewed: 9/10/2021        Codes Class Noted - Resolved POA    Hyperkalemia ICD-10-CM: E87.5  ICD-9-CM: 276.7  9/15/2021 - Present Unknown        Hematuria ICD-10-CM: R31.9  ICD-9-CM: 599.70  9/15/2021 - Present Unknown        * (Principal) REBEKAH (acute kidney injury) ([facility] [address] ICD-10-CM: N17.9  ICD-9-CM: 584.9  9/15/2021 - Present Unknown        Urinary retention ICD-10-CM: R33.9  ICD-9-CM: 788.20  9/15/2021 - Present Unknown        Bilateral hydronephrosis ICD-10-CM: N13.30  ICD-9-CM: 294  2021 - Present Unknown        Hypothyroid ICD-10-CM: E03.9  ICD-9-CM: 244.9  2019 - Present Yes    Overview Signed 2019 12:12 AM by MD collin Gibson w/med Objective:     Patient Vitals for the past 24 hrs:   Temp Pulse Resp BP SpO2   09/17/21 1205 98.3 °F (36.8 °C) 91 18 129/72 96 %   09/17/21 0740  (!) 102  138/77    09/17/21 0725 98.7 °F (37.1 °C) (!) 102 15  93 %   09/17/21 0245 99.4 °F (37.4 °C) (!) 109 17 (!) 140/68 94 %   09/16/21 2225 99.9 °F (37.7 °C) (!) 106 18 130/72 93 %   09/16/21 1941 99.6 °F (37.6 °C) 99 18 131/67 97 %   09/16/21 1511 100.1 °F (37.8 °C) (!) 101 23 (!) 145/80 98 %   09/16/21 1225  95  134/64 96 %     Oxygen Therapy  O2 Sat (%): 96 % (09/17/21 1205)  Pulse via Oximetry: 95 beats per minute (09/16/21 1225)  O2 Device: None (Room air) (09/17/21 0708)  O2 Flow Rate (L/min): 2 l/min (09/16/21 1154)  ETCO2 (mmHg): 44 mmHg (09/16/21 1116)    Estimated body mass index is 19.01 kg/m² as calculated from the following:    Height as of this encounter: 5' 8\" (1.727 m). Weight as of this encounter: 56.7 kg (125 lb). Intake/Output Summary (Last 24 hours) at 9/17/2021 1217  Last data filed at 9/17/2021 0503  Gross per 24 hour   Intake 693 ml   Output 2560 ml   Net -1867 ml         Physical Exam:   General:    No overt distress  Head:  Normocephalic, atraumatic  Eyes:  Sclerae appear normal.  Pupils equally round. ENT:  Nares appear normal, no drainage. Moist oral mucosa  Neck:  No restricted ROM. Trachea midline   CV:   RRR. No m/r/g. Lungs: No wheezing. Respirations even, unlabored on room air. Abdomen:   Soft, nontender, masses, colostomy  Extremities: No cyanosis or clubbing. Skin:     No rashes and normal coloration. Warm and dry. Neuro:  Cranial nerves II-XII grossly intact. Psych:  Normal mood and affect.   Alert and oriented x3    I have reviewed ordered lab tests and independently visualized imaging below:    Last 24hr Labs:  Recent Results (from the past 24 hour(s))   CBC W/O DIFF    Collection Time: 09/17/21  3:42 AM   Result Value Ref Range    WBC 16.5 (H) 4.3 - 11.1 K/uL    RBC 2.94 (L) 4.23 - 5.6 M/uL HGB 8.0 (L) 13.6 - 17.2 g/dL    HCT 25.5 (L) 41.1 - 50.3 %    MCV 86.7 79.6 - 97.8 FL    MCH 27.2 26.1 - 32.9 PG    MCHC 31.4 31.4 - 35.0 g/dL    RDW 14.1 11.9 - 14.6 %    PLATELET 043 618 - 470 K/uL    MPV 9.3 (L) 9.4 - 12.3 FL    ABSOLUTE NRBC 0.00 0.0 - 0.2 K/uL   METABOLIC PANEL, BASIC    Collection Time: 09/17/21  3:42 AM   Result Value Ref Range    Sodium 144 136 - 145 mmol/L    Potassium 3.8 3.5 - 5.1 mmol/L    Chloride 110 (H) 98 - 107 mmol/L    CO2 27 21 - 32 mmol/L    Anion gap 7 7 - 16 mmol/L    Glucose 106 (H) 65 - 100 mg/dL    BUN 57 (H) 8 - 23 MG/DL    Creatinine 2.75 (H) 0.8 - 1.5 MG/DL    GFR est AA 29 (L) >60 ml/min/1.73m2    GFR est non-AA 24 (L) >60 ml/min/1.73m2    Calcium 8.1 (L) 8.3 - 10.4 MG/DL       All Micro Results     Procedure Component Value Units Date/Time    CULTURE, BLOOD [[id number]] Collected: 09/15/21 1533    Order Status: Completed Specimen: Blood Updated: 09/17/21 1045     Special Requests: --        RIGHT  HAND       Culture result: NO GROWTH 2 DAYS       CULTURE, BLOOD [[id number]] Collected: 09/15/21 1945    Order Status: Completed Specimen: Blood Updated: 09/17/21 1045     Special Requests: --        LEFT  FOREARM       Culture result: NO GROWTH 2 DAYS       CULTURE, URINE [[id number]] Collected: 09/16/21 1108    Order Status: Completed Specimen: Kidney Updated: 09/17/21 0652     Special Requests: RIGHT        Culture result: NO GROWTH 1 DAY       CULTURE, URINE [[id number]]  (Abnormal) Collected: 09/16/21 1058    Order Status: Completed Specimen: Kidney Updated: 09/17/21 0650     Special Requests: LEFT        Culture result:       50,000-100,000 COLONIES/mL GRAM NEGATIVE RODS IDENTIFICATION AND SUSCEPTIBILITY TO FOLLOW                  CULTURE IN PROGRESS,FURTHER UPDATES TO FOLLOW          CULTURE, URINE [[id number]]  (Abnormal) Collected: 09/15/21 1531    Order Status: Completed Specimen: Cath Urine Updated: 09/17/21 0648     Special Requests: NO SPECIAL REQUESTS        Culture result:       >100,000 COLONIES/mL GRAM NEGATIVE RODS IDENTIFICATION AND SUSCEPTIBILITY TO FOLLOW          CULTURE, BODY FLUID MARYLOU Neely [[id number]] Collected: 09/16/21 1145    Order Status: Canceled Specimen: Body Fluid from Miscellaneous Fluid     CULTURE, BODY FLUID MARYLOU Neely [[id number]] Collected: 09/16/21 1145    Order Status: Canceled Specimen: Body Fluid from Miscellaneous Fluid           Other Studies:  No results found. Current Meds:  Current Facility-Administered Medications   Medication Dose Route Frequency    cefTRIAXone (ROCEPHIN) 1 g in 0.9% sodium chloride (MBP/ADV) 50 mL MBP  1 g IntraVENous Q24H    lactated Ringers infusion  75 mL/hr IntraVENous CONTINUOUS    midazolam (VERSED) injection 0.5-2 mg  0.5-2 mg IntraVENous Multiple    sodium chloride (NS) flush 5-40 mL  5-40 mL IntraVENous Q8H    sodium chloride (NS) flush 5-40 mL  5-40 mL IntraVENous PRN    acetaminophen (TYLENOL) tablet 650 mg  650 mg Oral Q6H PRN    Or    acetaminophen (TYLENOL) suppository 650 mg  650 mg Rectal Q6H PRN    polyethylene glycol (MIRALAX) packet 17 g  17 g Oral DAILY PRN    ondansetron (ZOFRAN ODT) tablet 4 mg  4 mg Oral Q8H PRN    Or    ondansetron (ZOFRAN) injection 4 mg  4 mg IntraVENous Q6H PRN    magnesium sulfate 2 g/50 ml IVPB (premix or compounded)  2 g IntraVENous PRN    famotidine (PEPCID) tablet 20 mg  20 mg Oral DAILY    amitriptyline (ELAVIL) tablet 100 mg  100 mg Oral QHS    clonazePAM (KlonoPIN) tablet 2 mg  2 mg Oral QHS    levothyroxine (SYNTHROID) tablet 125 mcg  125 mcg Oral ACB    OLANZapine (ZyPREXA) tablet 7.5 mg  7.5 mg Oral QHS    hydrALAZINE (APRESOLINE) tablet 50 mg  50 mg Oral Q6H PRN       Signed:  Keyur Rehman NP    Part of this note may have been written by using a voice dictation software. The note has been proof read but may still contain some grammatical/other typographical errors.

## 2021-09-18 LAB
ANION GAP SERPL CALC-SCNC: 10 MMOL/L (ref 7–16)
BACTERIA SPEC CULT: ABNORMAL
BACTERIA SPEC CULT: NORMAL
BUN SERPL-MCNC: 52 MG/DL (ref 8–23)
CALCIUM SERPL-MCNC: 8 MG/DL (ref 8.3–10.4)
CHLORIDE SERPL-SCNC: 107 MMOL/L (ref 98–107)
CO2 SERPL-SCNC: 26 MMOL/L (ref 21–32)
CREAT SERPL-MCNC: 1.97 MG/DL (ref 0.8–1.5)
ERYTHROCYTE [DISTWIDTH] IN BLOOD BY AUTOMATED COUNT: 14.4 % (ref 11.9–14.6)
GLUCOSE SERPL-MCNC: 94 MG/DL (ref 65–100)
HCT VFR BLD AUTO: 24.5 % (ref 41.1–50.3)
HGB BLD-MCNC: 7.7 G/DL (ref 13.6–17.2)
MCH RBC QN AUTO: 28 PG (ref 26.1–32.9)
MCHC RBC AUTO-ENTMCNC: 31.4 G/DL (ref 31.4–35)
MCV RBC AUTO: 89.1 FL (ref 79.6–97.8)
NRBC # BLD: 0 K/UL (ref 0–0.2)
PLATELET # BLD AUTO: 229 K/UL (ref 150–450)
PMV BLD AUTO: 9 FL (ref 9.4–12.3)
POTASSIUM SERPL-SCNC: 3.4 MMOL/L (ref 3.5–5.1)
RBC # BLD AUTO: 2.75 M/UL (ref 4.23–5.6)
SERVICE CMNT-IMP: ABNORMAL
SERVICE CMNT-IMP: NORMAL
SODIUM SERPL-SCNC: 143 MMOL/L (ref 136–145)
WBC # BLD AUTO: 12.5 K/UL (ref 4.3–11.1)

## 2021-09-18 PROCEDURE — 74011250637 HC RX REV CODE- 250/637: Performed by: NURSE PRACTITIONER

## 2021-09-18 PROCEDURE — 74011250636 HC RX REV CODE- 250/636: Performed by: INTERNAL MEDICINE

## 2021-09-18 PROCEDURE — 74011000258 HC RX REV CODE- 258: Performed by: INTERNAL MEDICINE

## 2021-09-18 PROCEDURE — 65660000000 HC RM CCU STEPDOWN

## 2021-09-18 PROCEDURE — 74011250637 HC RX REV CODE- 250/637: Performed by: HOSPITALIST

## 2021-09-18 PROCEDURE — 85027 COMPLETE CBC AUTOMATED: CPT

## 2021-09-18 PROCEDURE — 99232 SBSQ HOSP IP/OBS MODERATE 35: CPT | Performed by: UROLOGY

## 2021-09-18 PROCEDURE — 36415 COLL VENOUS BLD VENIPUNCTURE: CPT

## 2021-09-18 PROCEDURE — 80048 BASIC METABOLIC PNL TOTAL CA: CPT

## 2021-09-18 RX ORDER — POTASSIUM CHLORIDE 20 MEQ/1
20 TABLET, EXTENDED RELEASE ORAL
Status: COMPLETED | OUTPATIENT
Start: 2021-09-18 | End: 2021-09-18

## 2021-09-18 RX ADMIN — AMITRIPTYLINE HYDROCHLORIDE 100 MG: 50 TABLET, FILM COATED ORAL at 22:09

## 2021-09-18 RX ADMIN — LEVOTHYROXINE SODIUM 125 MCG: 0.12 TABLET ORAL at 06:21

## 2021-09-18 RX ADMIN — Medication 10 ML: at 15:13

## 2021-09-18 RX ADMIN — SODIUM CHLORIDE, SODIUM LACTATE, POTASSIUM CHLORIDE, AND CALCIUM CHLORIDE 75 ML/HR: 600; 310; 30; 20 INJECTION, SOLUTION INTRAVENOUS at 15:53

## 2021-09-18 RX ADMIN — CLONAZEPAM 2 MG: 1 TABLET ORAL at 22:10

## 2021-09-18 RX ADMIN — Medication 10 ML: at 06:21

## 2021-09-18 RX ADMIN — POTASSIUM CHLORIDE 20 MEQ: 20 TABLET, EXTENDED RELEASE ORAL at 09:51

## 2021-09-18 RX ADMIN — CEFEPIME HYDROCHLORIDE 2 G: 2 INJECTION, POWDER, FOR SOLUTION INTRAVENOUS at 09:51

## 2021-09-18 RX ADMIN — OLANZAPINE 7.5 MG: 5 TABLET, FILM COATED ORAL at 22:10

## 2021-09-18 RX ADMIN — FAMOTIDINE 20 MG: 20 TABLET, FILM COATED ORAL at 08:37

## 2021-09-18 NOTE — PROGRESS NOTES
Hospitalist Progress Note   Admit Date:  9/15/2021  7:03 AM   Name:  Dylon Marroquin   Age:  66 y.o. Sex:  male  :  1945   MRN:  [id number]   Room:      Presenting Complaint: Fatigue    Reason(s) for Admission: REBEKAH (acute kidney injury) ([facility] [address] [N17.9]  Hyperkalemia [E87.5]  Bilateral hydronephrosis [N13.30]  Hematuria [R31.9]     Hospital Course & Interval History:   Mr. Marroquin is a 66 y. o. 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  He had a follow-up on 9/10 with cystogram showing no bladder injury.  Patient reports that he has not been able to urinate much but has urinary incontinence.  Creatinine was normal about 15 days ago.  Renal ultrasound was done in the ER and showed bilateral hydronephrosis with bilateral renal stones.  Bishop catheter was placed and showed return of 1 L of urine blood-tinged.  Urology and Nephrology consulted. Roberto Justus demonstrated a posterior bladder wall defect.  Urine leak does look to only be in pelvic region and not in peritoneal space.  IR placed bilat perc neph tubes on Sep/16 for maximal diversion away from the bladder due to wall defect. Sep/17: No AEO. Denies CP/SOB. Having trouble with DIANE drain; Urology has contacted Dr. Ivon Romo, Plastic surgery to evaluate. Neph tubes and Bishop draining. PT/OT to evaluate patient today. Subjective (21):  No AEO. No new complaints. Urine cx resulted today; will adjust abx. Blood cxs remain negative. Renal function improving; cont Bishop and NTs. Monitor for bleeding; follow hgb. Assessment & Plan:     Principal Problem:  REBEKAH (acute kidney injury) ([facility] [address] (9/15/2021)  Creatinine 6 on admission.  Renal ultrasound shows bilateral hydronephrosis.  Bishop catheter placed in the ER.   Urology consulted.  Appreciate recommendations. CT abd/pel showed bilat hydronephrosis R > L.  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 Nephrology consulted.  Appreciate recommendations. Continue IV fluids  Sep/16: sCr 3.85; bilat perc neph tubes placed  Sep/17: sCr 2.75  Sep/18: sCr 1.97     Active Problems:  Posterior bladder wall defect  Pseudomonas aeruginosa UTI  Imaging reviewed; urine leak ppears confined to pelvic region  Monitor temp, WBC  Bilat perc neph tubes placed Sep/16 by IR; f/u in 6 weeks. May need open ex lap or cystoscopy in the near future if defect does not heal  Sep/17: Urine cx w/ GNR; awaiting final              - Blood cxs NG x 2 days  Sep/18: Urine cx results returned   - Stop ceftriaxone; start cefepime (renal adjusted)   - Other GNR organism in process   - Blood cxs NG x 3 days    Hematuria  Urology on board  Monitor CBC  Bilat perc neph tubes placed Sep/16 by IR    Bilateral hydronephrosis (2021)  Urology on board   Cont Bishop and NTs to drainage    Anemia  Sep/18: Slow down trend noted   - Hgb 7.7 this morning   - Transfuse if < 7    Essential hypertension  Sep/18: Consider adding non-selective BB; follow trends. - Cont PRN hydralazine    Hypokalemia  Sep/18: Replace   - BMP in a.m.     Hyperkalemia, resolved  Continue to monitor     Moderate protein calorie malnutrition     Hypothyroidism  Levothyroxine 125 mcg     Anxiety  Continue home Elavil, clonazepam, olanzapine. Dispo/Discharge Plannin-3 midnights    Diet:  ADULT DIET Regular; Low Potassium (Less than 3000 mg/day);  Low Phosphorus (Less than 1000 mg); 60 to 80 gm  DVT PPx: SCD  Code status: Full Code    Hospital Problems as of 2021 Date Reviewed: 9/10/2021        Codes Class Noted - Resolved POA    Hyperkalemia ICD-10-CM: E87.5  ICD-9-CM: 276.7 9/15/2021 - Present Unknown        Hematuria ICD-10-CM: R31.9  ICD-9-CM: 599.70  9/15/2021 - Present Unknown        * (Principal) REBEKAH (acute kidney injury) ([facility] [address] ICD-10-CM: N17.9  ICD-9-CM: 584.9  9/15/2021 - Present Unknown        Urinary retention ICD-10-CM: R33.9  ICD-9-CM: 788.20  9/15/2021 - Present Unknown        Bilateral hydronephrosis ICD-10-CM: N13.30  ICD-9-CM: 694  5/11/2021 - Present Unknown        Hypothyroid ICD-10-CM: E03.9  ICD-9-CM: 244.9  6/14/2019 - Present Yes    Overview Signed 6/14/2019 12:12 AM by Marissa Maya MD     stable w/med                   Objective:     Patient Vitals for the past 24 hrs:   Temp Pulse Resp BP SpO2   09/18/21 0243 98.6 °F (37 °C) 92 17 (!) 150/79 95 %   09/17/21 2227 98.5 °F (36.9 °C) 92 18 138/76 95 %   09/17/21 1931 99 °F (37.2 °C) 98 18 (!) 149/81 93 %   09/17/21 1525 97.8 °F (36.6 °C) 93 18 (!) 144/76 91 %   09/17/21 1205 98.3 °F (36.8 °C) 91 18 129/72 96 %     Oxygen Therapy  O2 Sat (%): 95 % (09/18/21 0243)  Pulse via Oximetry: 95 beats per minute (09/16/21 1225)  O2 Device: None (Room air) (09/18/21 0804)  O2 Flow Rate (L/min): 2 l/min (09/16/21 1154)  ETCO2 (mmHg): 44 mmHg (09/16/21 1116)    Estimated body mass index is 19.01 kg/m² as calculated from the following:    Height as of this encounter: 5' 8\" (1.727 m). Weight as of this encounter: 56.7 kg (125 lb). Intake/Output Summary (Last 24 hours) at 9/18/2021 1029  Last data filed at 9/18/2021 0900  Gross per 24 hour   Intake 4920.25 ml   Output 2700 ml   Net 2220.25 ml         Physical Exam:  General:    No overt distress  Head:  Normocephalic, atraumatic  Eyes:  Sclerae appear normal.  Pupils equally round. ENT:  Nares appear normal, no drainage. Moist oral mucosa  Neck:  No restricted ROM. Trachea midline   CV:   RRR. No m/r/g. Lungs: No wheezing. Respirations even, unlabored on room air. Abdomen:   Soft, nontender, non distended, colostomy  Extremities: No cyanosis or clubbing. Skin:     No rashes and normal coloration. Warm and dry. Neuro:  Cranial nerves II-XII grossly intact. Psych:  Normal mood and affect.   Alert, some confusion    I have reviewed ordered lab tests and independently visualized imaging below:    Last 24hr Labs:  Recent Results (from the past 24 hour(s))   CBC W/O DIFF    Collection Time: 09/18/21  3:50 AM   Result Value Ref Range    WBC 12.5 (H) 4.3 - 11.1 K/uL    RBC 2.75 (L) 4.23 - 5.6 M/uL    HGB 7.7 (L) 13.6 - 17.2 g/dL    HCT 24.5 (L) 41.1 - 50.3 %    MCV 89.1 79.6 - 97.8 FL    MCH 28.0 26.1 - 32.9 PG    MCHC 31.4 31.4 - 35.0 g/dL    RDW 14.4 11.9 - 14.6 %    PLATELET 119 427 - 325 K/uL    MPV 9.0 (L) 9.4 - 12.3 FL    ABSOLUTE NRBC 0.00 0.0 - 0.2 K/uL   METABOLIC PANEL, BASIC    Collection Time: 09/18/21  3:50 AM   Result Value Ref Range    Sodium 143 136 - 145 mmol/L    Potassium 3.4 (L) 3.5 - 5.1 mmol/L    Chloride 107 98 - 107 mmol/L    CO2 26 21 - 32 mmol/L    Anion gap 10 7 - 16 mmol/L    Glucose 94 65 - 100 mg/dL    BUN 52 (H) 8 - 23 MG/DL    Creatinine 1.97 (H) 0.8 - 1.5 MG/DL    GFR est AA 43 (L) >60 ml/min/1.73m2    GFR est non-AA 35 (L) >60 ml/min/1.73m2    Calcium 8.0 (L) 8.3 - 10.4 MG/DL       All Micro Results     Procedure Component Value Units Date/Time    CULTURE, URINE [[id number]]  (Abnormal) Collected: 09/16/21 1058    Order Status: Completed Specimen: Kidney Updated: 09/18/21 0722     Special Requests: LEFT        Culture result:       50,000-100,000 COLONIES/mL PSEUDOMONAS AERUGINOSA REPEATING SUSCEPTIBILITY                  2000 COLONIES/mL 2ND GRAM NEGATIVE PRAKASH SUBCULTURE IN PROGRESS          CULTURE, URINE [[id number]] Collected: 09/16/21 1108    Order Status: Completed Specimen: Kidney Updated: 09/18/21 0718     Special Requests: RIGHT        Culture result: NO GROWTH 2 DAYS       CULTURE, URINE [[id number]]  (Abnormal)  (Susceptibility) Collected: 09/15/21 1531    Order Status: Completed Specimen: Cath Urine Updated: 09/18/21 8595 Special Requests: NO SPECIAL REQUESTS        Culture result:       >100,000 COLONIES/mL PSEUDOMONAS AERUGINOSA          CULTURE, BLOOD [[id number]] Collected: 09/15/21 1533    Order Status: Completed Specimen: Blood Updated: 09/18/21 0608     Special Requests: --        RIGHT  HAND       Culture result: NO GROWTH 3 DAYS       CULTURE, BLOOD [[id number]] Collected: 09/15/21 1945    Order Status: Completed Specimen: Blood Updated: 09/18/21 0608     Special Requests: --        LEFT  FOREARM       Culture result: NO GROWTH 3 DAYS       CULTURE, BODY FLUID Vania Carlos [[id number]] Collected: 09/16/21 1145    Order Status: Canceled Specimen: Body Fluid from Miscellaneous Fluid     CULTURE, BODY FLUID MARYLOU Giordano [[id number]] Collected: 09/16/21 1145    Order Status: Canceled Specimen: Body Fluid from Miscellaneous Fluid           Other Studies:  No results found.     Current Meds:  Current Facility-Administered Medications   Medication Dose Route Frequency    cefepime (MAXIPIME) 2 g in 0.9% sodium chloride (MBP/ADV) 100 mL MBP  2 g IntraVENous Q24H    lactated Ringers infusion  75 mL/hr IntraVENous CONTINUOUS    midazolam (VERSED) injection 0.5-2 mg  0.5-2 mg IntraVENous Multiple    sodium chloride (NS) flush 5-40 mL  5-40 mL IntraVENous Q8H    sodium chloride (NS) flush 5-40 mL  5-40 mL IntraVENous PRN    acetaminophen (TYLENOL) tablet 650 mg  650 mg Oral Q6H PRN    Or    acetaminophen (TYLENOL) suppository 650 mg  650 mg Rectal Q6H PRN    polyethylene glycol (MIRALAX) packet 17 g  17 g Oral DAILY PRN    ondansetron (ZOFRAN ODT) tablet 4 mg  4 mg Oral Q8H PRN    Or    ondansetron (ZOFRAN) injection 4 mg  4 mg IntraVENous Q6H PRN    famotidine (PEPCID) tablet 20 mg  20 mg Oral DAILY    amitriptyline (ELAVIL) tablet 100 mg  100 mg Oral QHS    clonazePAM (KlonoPIN) tablet 2 mg  2 mg Oral QHS    levothyroxine (SYNTHROID) tablet 125 mcg  125 mcg Oral ACB    OLANZapine (ZyPREXA) tablet 7.5 mg  7.5 mg Oral QHS    hydrALAZINE (APRESOLINE) tablet 50 mg  50 mg Oral Q6H PRN       Signed:  Tristin Meneses NP    Part of this note may have been written by using a voice dictation software. The note has been proof read but may still contain some grammatical/other typographical errors.

## 2021-09-18 NOTE — PROGRESS NOTES
Admit Date: 9/15/2021    Subjective:     Jania Marroquin is resting well. Denies pain. Bilateral NT in place with clear urine output, slight pink tinge on the R. Brito in place with dark luigi urine OP. DIANE drain in RLE out and perineal staples coming out per RN. Labs improving. Objective:     Patient Vitals for the past 8 hrs:   BP Temp Pulse Resp SpO2   09/18/21 0243 (!) 150/79 98.6 °F (37 °C) 92 17 95 %     No intake/output data recorded.   09/16 1901 - 09/18 0700  In: 1596 [I.V.:1596]  Out: 3835 [Urine:3825; Drains:10]    Physical Exam:  GENERAL: alert, cooperative, no distress  LUNG: clear to auscultation bilaterally  HEART: regular rate and rhythm, S1, S2   ABDOMEN: soft, non-tender, colostomy with stool  NEUROLOGIC: AOx3  : B NT/brito catheter in place      Data Review   Recent Results (from the past 24 hour(s))   CBC W/O DIFF    Collection Time: 09/18/21  3:50 AM   Result Value Ref Range    WBC 12.5 (H) 4.3 - 11.1 K/uL    RBC 2.75 (L) 4.23 - 5.6 M/uL    HGB 7.7 (L) 13.6 - 17.2 g/dL    HCT 24.5 (L) 41.1 - 50.3 %    MCV 89.1 79.6 - 97.8 FL    MCH 28.0 26.1 - 32.9 PG    MCHC 31.4 31.4 - 35.0 g/dL    RDW 14.4 11.9 - 14.6 %    PLATELET 999 855 - 651 K/uL    MPV 9.0 (L) 9.4 - 12.3 FL    ABSOLUTE NRBC 0.00 0.0 - 0.2 K/uL   METABOLIC PANEL, BASIC    Collection Time: 09/18/21  3:50 AM   Result Value Ref Range    Sodium 143 136 - 145 mmol/L    Potassium 3.4 (L) 3.5 - 5.1 mmol/L    Chloride 107 98 - 107 mmol/L    CO2 26 21 - 32 mmol/L    Anion gap 10 7 - 16 mmol/L    Glucose 94 65 - 100 mg/dL    BUN 52 (H) 8 - 23 MG/DL    Creatinine 1.97 (H) 0.8 - 1.5 MG/DL    GFR est AA 43 (L) >60 ml/min/1.73m2    GFR est non-AA 35 (L) >60 ml/min/1.73m2    Calcium 8.0 (L) 8.3 - 10.4 MG/DL       Assessment:     Principal Problem:    [facility]) [facility] (9/15/2021)    Active Problems:    Hypothyroid (6/14/2019)      Overview: stable w/med      Bilateral hydronephrosis (5/11/2021)      Hyperkalemia (9/15/2021)      Hematuria (9/15/2021)      Urinary retention (9/15/2021)      66 y.o. male with a past medical hx of kidney stones, pelvic abscess and urinary retention who had a completion proctectomy by Dr. Tomasa Goyal. A cystotomy occurred during surgery and was repaired intra-operatively by Dr. Tomasa Gyoal. He presented for follow-up on 9/10 with cystogram performed in office showing no bladder injury with complete healing of repair and L grade 3 vesico-ureteral reflux. He presents to ER 9/15 with c/o fatigue and weakness. Cn found to be 6.09 (0.9 on 8/31). Brito placed with 1L of blood tinged UOP. Per notes, RN manually irrigated brito. Renal US shows bladder collapsed with brito in place with possible sediment in bladder and bilateral hydro with R>L with bilateral renal stones. We are consulted for urinary retention,  Bilateral hydronephrosis, gross hematuria. CT AP shows R>L hydro with inflammatory process in pelvis producing partial obs of ureters, probable defect in posterior bladder. CT cystogram shows extravasation  through a large defect within the posterior bladder wall, fills the presacral space and approaches an apparent perianal fistulous tract. Had fever last night up to 102.7. UA with >100 WBC, >100 RBC, trace bacteria, urine culture added on, WBC 19.9.    9/17- labs improving, UC growing GNR, on rocephin. SP bilateral NT placement 9/16. Afebrile. 9/18 - labs improving. On cefepime. UC with GNR. Plan:     Continue bilateral NT to drainage. Continue brito catheter to drainage. Continue IV antibiotics. Follow up cultures. Philip A. Laurita Prader, M.D.     [facility]  [address], [address] [facility]  Phone: [phone]  Fax: [phone]

## 2021-09-18 NOTE — PROGRESS NOTES
Pt is consistently asking about klonopin dosage, insisting his psych MD prescribed him more (6mg) than is being given here (2mg). I explained PTA med info to him including the max dose ordered. Still insisting he speak with an MD, stating he is a psych pt and cannot go without. NP notified via [employer].

## 2021-09-18 NOTE — PROGRESS NOTES
Comprehensive Nutrition Assessment    Type and Reason for Visit: Initial, Positive nutrition screen  Best Practice Alert for Malnutrition Screening Tool: Recently Lost Weight Without Trying: Unsure,  , Eating Poorly Due to Decreased Appetite: No   This assessment was completed remotely. Patient consent was obtained for remote assessment. Nutrition Recommendations/Plan:   Meals and Snacks:  Change current diet. Liberalize phosphorus restriction. Nutrition Supplement Therapy:   Medical food supplement therapy:  Initiate Nepro twice per day (this provides 420 kcal and 19 grams protein per bottle)     Malnutrition Assessment:  Malnutrition Status: At risk for malnutrition (specify) (Pt stated low appetite.)    Nutrition Assessment:   Nutrition History: Pt states his appetite has been lowsy since 2014. His lowest weight was 110# and is up to 125# now (per pt). He has protein powder at home and consumes 25 g per day. Nutrition Background: Admitted with REBEKAH- he has a complicated surgical history. Daily Update:  Spoke to patient on the phone. He does not follow renal diet at home. No phosphorus lab- liberalize diet restriction to allow more choices. Nutrition Related Findings:   9/18 remote assessment NFPE deferred. Current Nutrition Therapies:  ADULT DIET Regular; Low Potassium (Less than 3000 mg/day);  Low Phosphorus (Less than 1000 mg); 60 to 80 gm    Current Intake:   Average Meal Intake: 51-75% Average Supplement Intake: None ordered      Anthropometric Measures:  Height: 5' 8\" (172.7 cm)  Current Body Wt: 56.7 kg (125 lb) (9/15), Weight source: Not specified  BMI: 19, Underweight (BMI less than 22) age over 72     Ideal Body Weight (lbs) (Calculated): 154 lbs (70 kg), 81.2 %  Usual Body Wt: 56.7 kg (125 lb) (per patient), Percent weight change: 0          Edema: RLE: 1+;2+ (9/18/2021  8:04 AM)     Estimated Daily Nutrient Needs:  Energy (kcal/day): 3262-8712 (Kcal/kg (30-35), Weight Used: Current (56.7 kg))  Protein (g/day): 57-68 (1-1.2) Weight Used: (Current)  Fluid (ml/day):   (1 ml/kcal)    Nutrition Diagnosis:   · Inadequate oral intake related to  (multiple surgeries, ostomy, decreased appetite) as evidenced by intake 51-75%, BMI    Nutrition Interventions:   Food and/or Nutrient Delivery: Modify current diet, Start oral nutrition supplement     Coordination of Nutrition Care: Continue to monitor while inpatient  Plan of Care discussed with patient. Goals: Active Goal: Meet at least 75% of est needs by next assessment.      Nutrition Monitoring and Evaluation:      Food/Nutrient Intake Outcomes: Food and nutrient intake, Supplement intake       Discharge Planning:    Continue oral nutrition supplement    Davi Collier RD, LD  Contact: [phone]      Disaster Mode Active

## 2021-09-18 NOTE — PROGRESS NOTES
Problem: Falls - Risk of  Goal: *Absence of Falls  Description: Document Omayra Zimmerman Fall Risk and appropriate interventions in the flowsheet.   Outcome: Progressing Towards Goal  Note: Fall Risk Interventions:  Mobility Interventions: Patient to call before getting OOB    Mentation Interventions: Bed/chair exit alarm, More frequent rounding    Medication Interventions: Evaluate medications/consider consulting pharmacy, Patient to call before getting OOB    Elimination Interventions: Call light in reach, Patient to call for help with toileting needs

## 2021-09-19 VITALS
BODY MASS INDEX: 18.94 KG/M2 | WEIGHT: 125 LBS | OXYGEN SATURATION: 95 % | RESPIRATION RATE: 18 BRPM | SYSTOLIC BLOOD PRESSURE: 146 MMHG | TEMPERATURE: 99.1 F | DIASTOLIC BLOOD PRESSURE: 82 MMHG | HEIGHT: 68 IN | HEART RATE: 92 BPM

## 2021-09-19 LAB
ANION GAP SERPL CALC-SCNC: 9 MMOL/L (ref 7–16)
BUN SERPL-MCNC: 35 MG/DL (ref 8–23)
CALCIUM SERPL-MCNC: 7.9 MG/DL (ref 8.3–10.4)
CHLORIDE SERPL-SCNC: 108 MMOL/L (ref 98–107)
CO2 SERPL-SCNC: 26 MMOL/L (ref 21–32)
CREAT SERPL-MCNC: 1.44 MG/DL (ref 0.8–1.5)
ERYTHROCYTE [DISTWIDTH] IN BLOOD BY AUTOMATED COUNT: 14.3 % (ref 11.9–14.6)
GLUCOSE SERPL-MCNC: 93 MG/DL (ref 65–100)
HCT VFR BLD AUTO: 23.4 % (ref 41.1–50.3)
HGB BLD-MCNC: 7.4 G/DL (ref 13.6–17.2)
MCH RBC QN AUTO: 27.8 PG (ref 26.1–32.9)
MCHC RBC AUTO-ENTMCNC: 31.6 G/DL (ref 31.4–35)
MCV RBC AUTO: 88 FL (ref 79.6–97.8)
NRBC # BLD: 0 K/UL (ref 0–0.2)
PLATELET # BLD AUTO: 232 K/UL (ref 150–450)
PMV BLD AUTO: 9.1 FL (ref 9.4–12.3)
POTASSIUM SERPL-SCNC: 3.3 MMOL/L (ref 3.5–5.1)
RBC # BLD AUTO: 2.66 M/UL (ref 4.23–5.6)
SODIUM SERPL-SCNC: 143 MMOL/L (ref 138–145)
WBC # BLD AUTO: 9.5 K/UL (ref 4.3–11.1)

## 2021-09-19 PROCEDURE — 80048 BASIC METABOLIC PNL TOTAL CA: CPT

## 2021-09-19 PROCEDURE — 2709999900 HC NON-CHARGEABLE SUPPLY

## 2021-09-19 PROCEDURE — 74011250636 HC RX REV CODE- 250/636: Performed by: INTERNAL MEDICINE

## 2021-09-19 PROCEDURE — 85027 COMPLETE CBC AUTOMATED: CPT

## 2021-09-19 PROCEDURE — 74011250637 HC RX REV CODE- 250/637: Performed by: HOSPITALIST

## 2021-09-19 PROCEDURE — 74011000258 HC RX REV CODE- 258: Performed by: INTERNAL MEDICINE

## 2021-09-19 PROCEDURE — 74011250637 HC RX REV CODE- 250/637: Performed by: NURSE PRACTITIONER

## 2021-09-19 PROCEDURE — 36415 COLL VENOUS BLD VENIPUNCTURE: CPT

## 2021-09-19 PROCEDURE — 99233 SBSQ HOSP IP/OBS HIGH 50: CPT | Performed by: UROLOGY

## 2021-09-19 RX ORDER — UREA 10 %
2 LOTION (ML) TOPICAL 2 TIMES DAILY
Qty: 60 TABLET | Refills: 0 | Status: SHIPPED | OUTPATIENT
Start: 2021-09-19 | End: 2021-10-04

## 2021-09-19 RX ORDER — POTASSIUM CHLORIDE 20 MEQ/1
20 TABLET, EXTENDED RELEASE ORAL
Status: COMPLETED | OUTPATIENT
Start: 2021-09-19 | End: 2021-09-19

## 2021-09-19 RX ORDER — CIPROFLOXACIN 750 MG/1
750 TABLET, FILM COATED ORAL 2 TIMES DAILY
Qty: 28 TABLET | Refills: 0 | Status: SHIPPED | OUTPATIENT
Start: 2021-09-19 | End: 2021-09-28

## 2021-09-19 RX ORDER — CARVEDILOL 3.12 MG/1
6.25 TABLET ORAL 2 TIMES DAILY WITH MEALS
Qty: 120 TABLET | Refills: 0 | Status: SHIPPED | OUTPATIENT
Start: 2021-09-19 | End: 2021-10-19

## 2021-09-19 RX ORDER — FERROUS SULFATE 325(65) MG
325 TABLET, DELAYED RELEASE (ENTERIC COATED) ORAL
Qty: 30 TABLET | Refills: 0 | Status: SHIPPED | OUTPATIENT
Start: 2021-09-19 | End: 2021-10-19

## 2021-09-19 RX ORDER — POTASSIUM CHLORIDE 750 MG/1
10 TABLET, EXTENDED RELEASE ORAL DAILY
Qty: 7 TABLET | Refills: 0 | Status: SHIPPED | OUTPATIENT
Start: 2021-09-19 | End: 2021-09-28

## 2021-09-19 RX ORDER — CARVEDILOL 3.12 MG/1
3.12 TABLET ORAL 2 TIMES DAILY WITH MEALS
Status: DISCONTINUED | OUTPATIENT
Start: 2021-09-19 | End: 2021-09-19 | Stop reason: HOSPADM

## 2021-09-19 RX ADMIN — FAMOTIDINE 20 MG: 20 TABLET, FILM COATED ORAL at 09:22

## 2021-09-19 RX ADMIN — LEVOTHYROXINE SODIUM 125 MCG: 0.12 TABLET ORAL at 06:17

## 2021-09-19 RX ADMIN — SODIUM CHLORIDE, SODIUM LACTATE, POTASSIUM CHLORIDE, AND CALCIUM CHLORIDE 75 ML/HR: 600; 310; 30; 20 INJECTION, SOLUTION INTRAVENOUS at 04:04

## 2021-09-19 RX ADMIN — CARVEDILOL 3.12 MG: 3.12 TABLET, FILM COATED ORAL at 09:22

## 2021-09-19 RX ADMIN — CEFEPIME HYDROCHLORIDE 2 G: 2 INJECTION, POWDER, FOR SOLUTION INTRAVENOUS at 09:32

## 2021-09-19 RX ADMIN — Medication 10 ML: at 04:04

## 2021-09-19 RX ADMIN — POTASSIUM CHLORIDE 20 MEQ: 1500 TABLET, EXTENDED RELEASE ORAL at 09:22

## 2021-09-19 NOTE — DISCHARGE SUMMARY
Hospitalist Discharge Summary   Admit Date:  9/15/2021  7:03 AM   DC Note date: 2021  Name:  [place]   Age:  66 y.o. Sex:  male  :  1945   MRN:  [id number]   Room:    PCP:  Raysa Ellis DO    Presenting Complaint: Fatigue    Initial Admission Diagnosis: REBEKAH (acute kidney injury) ([facility] [N17.9]  Hyperkalemia [E87.5]  Bilateral hydronephrosis [N13.30]  Hematuria [R31.9]     Problem List for this Hospitalization:  Hospital Problems as of 2021 Date Reviewed: 9/10/2021        Codes Class Noted - Resolved POA    Hyperkalemia ICD-10-CM: E87.5  ICD-9-CM: 276.7  9/15/2021 - Present Unknown        Hematuria ICD-10-CM: R31.9  ICD-9-CM: 599.70  9/15/2021 - Present Unknown        * (Principal) REBEKAH (acute kidney injury) ([facility] ICD-10-CM: N17.9  ICD-9-CM: 584.9  9/15/2021 - Present Unknown        Urinary retention ICD-10-CM: R33.9  ICD-9-CM: 788.20  9/15/2021 - Present Unknown        Bilateral hydronephrosis ICD-10-CM: N13.30  ICD-9-CM: 567  2021 - Present Unknown        Hypothyroid ICD-10-CM: E03.9  ICD-9-CM: 244.9  2019 - Present Yes    Overview Signed 2019 12:12 AM by Ifrah Chowdary MD     stable w/med                 Did Patient have Sepsis (YES OR NO): No      Hospital Course:  Mr. Marroquin is a 66 y. o. 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  He had a follow-up on 9/10 with cystogram showing no bladder injury.  Patient reports that he has not been able to urinate much but has urinary incontinence.  Renal ultrasound was done in the ER and showed bilateral hydronephrosis with bilateral renal stones.  Bishop catheter was placed and showed return of 1 L of urine blood-tinged.  Urology and Nephrology consulted.  Cystogram demonstrated a posterior bladder wall defect.  Urine leak does look to only be in pelvic region and not in peritoneal space.  IR placed bilat perc neph tubes on Sep/16 for maximal diversion away from the bladder due to wall defect. At time of discharge bilat NT drainage is clear, yellow. Renal function continues to improve. WBC normal at time of discharge. Will discharge on 2 week course of cipro for complicated Pseudomonas UTI. He is to follow up with his PCP within 5 days and see Dr. Rosina Cabrera, Urologist in 2-3 weeks for cystoscopy. He is to follow up with Dr. Rigo St, Int Radiologist in 6 weeks. BMP and CBC ordered for next week and can be completed at PCP's office. Home health recommended by PT and patient stated he is agreeable to [facility]; I also encouraged him to utilize this very valuable service. Mr. Stephani Dalal verbally return demonstrates that he understands the plan. He is appropriate for discharge home with home health on Sep/19/2021. Assessment and Plan:  Principal Problem:  REBEKAH (acute kidney injury) ([address] (9/15/2021)  Creatinine 6 on admission.  Renal ultrasound shows bilateral hydronephrosis.  Bishop catheter placed in the ER. IVF. Urology consulted.  Appreciate recommendations. CT abd/pel read as bilat hydronephrosis R > L.  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 Nephrology consulted.  Appreciate recommendations. Sep/16: sCr 3.85; bilat perc neph tubes placed  Sep/17: sCr 2.75  Sep/18: sCr 1.97  Sep/19: sCr 1.44     Active Problems:  Posterior bladder wall defect  Pseudomonas aeruginosa UTI  Imaging reviewed; urine leak ppears confined to pelvic region  Monitor temp, WBC  Bilat perc neph tubes placed Sep/16 by IR; f/u in 6 weeks.   May need open ex lap or cystoscopy in the near future if defect does not heal  Sep/17: Urine cx w/ GNR; awaiting final              - Blood cxs NG x 2 days  Sep/18: Urine cx results returned              - Stop ceftriaxone; start cefepime (renal adjusted)              - Other GNR organism in process              - Blood cxs NG x 3 days  Sep/19: Discharge with cipro 750 mg q12hr x 14 days   - Blood cxs NG x 4 days   - WBC wnl, afebrile, no chills/sweats   - F/u PCP in 5 days     Hematuria  Urology on board  Monitor CBC  Bilat perc neph tubes placed Sep/16 by IR; clear, yellow at time of discharge     Bilateral hydronephrosis (5/11/2021)  Urology on board   Cont Bishop and NTs to drainage     Anemia  CBC next week  Start FeSO4 daily     Essential hypertension  Sep/18: Consider adding non-selective BB; follow trends. - Cont PRN hydralazine  Start carvedilol     Hypokalemia  Sep/19: Replace  Start 10 mEq daily x 7 days; f/u BMP outpatient     Hyperkalemia, resolved     Moderate protein calorie malnutrition  Appreciate RD recommendations     Hypothyroidism  Levothyroxine 125 mcg     Anxiety  Continue home Elavil, clonazepam, olanzapine. Disposition: Home Health Care Svc  Diet: ADULT DIET Regular; Low Potassium (Less than 3000 mg/day); 60 to 80 gm  ADULT ORAL NUTRITION SUPPLEMENT Lunch, Dinner; Renal Supplement  Code Status: Full Code    Follow Up Orders: Follow-up Appointments   Procedures    FOLLOW UP VISIT Appointment in: Other (Specify)     Standing Status:   Standing     Number of Occurrences:   1     Order Specific Question:   Appointment in     Answer:    Other (Specify)       Follow-up Information     Follow up With Specialties Details Why [address]  someone with home care will contact you to arrange home visit [address] Kimberly S, R Keira 99, DO Internal Medicine In 5 days post discharge check up and medication review [address] Natalia Brian MD Urology In 2 chante [place] office to schedule outpatient cystoscopy in approx 2 weeks [address]      Donna Kohli MD Radiology In 6 weeks bilat perc neph tubes follow up per Dr. Lexa Mccord [address] [place] [address]  [phone]            Time spent in patient discharge and coordination 35 minutes. Plan was discussed with Mr. Natali Lopez and RN. All questions answered. Patient was stable at time of discharge. Instructions given to call a physician or return if any concerns. Discharge Info:   Current Discharge Medication List      START taking these medications    Details   carvediloL (COREG) 3.125 mg tablet Take 2 Tablets by mouth two (2) times daily (with meals) for 30 days. Indications: high blood pressure  Qty: 120 Tablet, Refills: 0  Start date: 9/19/2021, End date: 10/19/2021      ciprofloxacin HCl (CIPRO) 750 mg tablet Take 1 Tablet by mouth two (2) times a day for 14 days. Indications: infection of urinary tract due to Pseudomonas aeruginosa  Qty: 28 Tablet, Refills: 0  Start date: 9/19/2021, End date: 10/3/2021      Lactobacillus Acidoph & Bulgar (Floranex) 1 million cell tab tablet Take 2 Tablets by mouth two (2) times a day for 15 days. Qty: 60 Tablet, Refills: 0  Start date: 9/19/2021, End date: 10/4/2021      ferrous sulfate (IRON) 325 mg (65 mg iron) EC tablet Take 1 Tablet by mouth Daily (before breakfast) for 30 days. Qty: 30 Tablet, Refills: 0  Start date: 9/19/2021, End date: 10/19/2021      potassium chloride (KLOR-CON) 10 mEq tablet Take 1 Tablet by mouth daily for 7 days.   Qty: 7 Tablet, Refills: 0  Start date: 9/19/2021, End date: 9/26/2021         CONTINUE these medications which have NOT CHANGED    Details   acetaminophen (TYLENOL) 500 mg tablet Take 500 mg by mouth every six (6) hours as needed for Pain.      levothyroxine (SYNTHROID) 125 mcg tablet TAKE ONE TABLET BY MOUTH ONE TIME DAILY BEFORE BREAKFAST  Qty: 90 Tab, Refills: 3    Associated Diagnoses: Acquired hypothyroidism      clonazePAM (KLONOPIN) 2 mg tablet Take 2 Tabs by mouth nightly. Max Daily Amount: 4 mg. Indications: takes for sleep  Qty: 15 Tab, Refills: 0    Associated Diagnoses: Primary insomnia      amitriptyline (ELAVIL) 100 mg tablet Take 1 Tab by mouth nightly. Dr Keenan Lyme psych  Indications: takes for sleep  Qty: 15 Tab, Refills: 0      OLANZapine (ZYPREXA) 2.5 mg tablet Take 7.5 mg by mouth nightly. Indications: additional medications to treat depression, Takes 3 x 2.5 mg tabs      Cholecalciferol, Vitamin D3, (VITAMIN D3) 2,000 unit cap capsule Take 2,000 Units by mouth two (2) times a day. Qty: 180 Cap, Refills: 3         STOP taking these medications       ibuprofen (MOTRIN) 200 mg tablet Comments:   Reason for Stopping:               Procedures done this admission:  * No surgery found *    Consults this admission:  IP CONSULT TO NEPHROLOGY  IP CONSULT TO UROLOGY  IP CONSULT TO INTERVENTIONAL RADIOLOGY  IP CONSULT TO GENERAL SURGERY  IP CONSULT TO PLASTIC SURGERY    Echocardiogram/EKG results:  No results found for this visit on 09/15/21. Echo results may be under imaging section below.     EKG Results     Procedure 720 Value Units Date/Time    EKG, 12 LEAD, INITIAL [[id number]] Collected: 09/15/21 1032    Order Status: Completed Updated: 09/16/21 0724     Ventricular Rate 83 BPM      Atrial Rate 83 BPM      P-R Interval 186 ms      QRS Duration 90 ms      Q-T Interval 374 ms      QTC Calculation (Bezet) 439 ms      Calculated P Axis 54 degrees      Calculated R Axis 63 degrees      Calculated T Axis 56 degrees      Diagnosis --     Normal sinus rhythm  Normal ECG  When compared with ECG of 20-AUG-2021 12:12,  Premature ventricular complexes are no longer Present  Confirmed by [facility]  MD ()MAX ([id number]) on 9/16/2021 7:24:54 AM      EKG, 12 LEAD, INITIAL [[id number]]     Order Status: Completed           Diagnostic Imaging/Tests:   US RETROPERITONEUM COMP    Result Date: 9/15/2021  INDICATION:  Acute renal insufficiency TECHNIQUE: Real-time sonography of the kidneys, retroperitoneum and bladder was performed with multiple static images obtained. FINDINGS: The right kidney measures 12.8 cm and the left kidney measures 13 cm in length. There is bilateral hydronephrosis, right greater than left. There are small echogenic areas in both kidneys, likely stones. Largest is right lower pole. There are no significant renal masses. The aorta and IVC are normal in caliber. The urinary bladder is collapsed around a Bishop catheter. There may be some debris in the bladder. There is no ascites. 1.  Multiple kidney stones. 2.  Bilateral hydronephrosis, right greater than left. IR NEPHROSTOMY PERC LT PLC CATH  SI    Result Date: 9/16/2021  PROCEDURE: Bilateral Genitourinary catheter placement Procedural Personnel Attending physician(s): Jason Grant M.D. Pre-procedure diagnosis: Pleasant 47-year-old man with history of rectal cancer status post chemotherapy, radiation therapy, and multiple abdominal surgeries. The patient is well known to our practice from previous abscesses requiring percutaneous drain placement and maintenance (6/7/2016 through 6/29/2016 and 5/1/2019 through surgery last month). Acute renal failure (creatinine = 6.09) and CT scan evidence of bilateral hydronephrosis and hydroureter with a mass in the posterior wall of the bladder and urine leak into the pelvis. History is significant for completion proctectomy complicated by bladder injury and surgical closure a little over 2 weeks ago. 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  Post-procedure diagnosis: Same Indication: Bilateral Urinary obstruction Catheter(s) placed because the previous catheter(s) became dislodged within 30 days of placement (QCDR): No Complications: No immediate complications. Successful percutaneous access to the Bilateral kidney with nephrostomy drain placement x2. Plan:  One hour bedrest recovery. Gravity bag drainage. Return in about 6 weeks for drain maintenance. Urinary diversion may be necessary lifelong if the bladder leak does not heal on its own.  _______________________________________________________________ PROCEDURE SUMMARY - Target organ: Bilateral native kidneys - Image-guided placement of genitourinary catheter(s) PROCEDURE DETAILS: Pre-procedure Consent:  Informed written and oral consent for the procedure was obtained after explanation of risks (including, but not limitted to: Infection, hemorrhage, kidney injury, lung injury, intestine injury), benefits and alternatives. The patient's questions were answered to their satisfaction. They stated understanding and requested that we proceed. Final verification:  A time-out identifying the patient and planned procedure was performed prior to the procedure. Preparation:  Maximal sterile barrier technique (including:  cap, mask, sterile gown, sterile gloves, sterile sheet, hand hygiene, and cutaneous antisepsis) was used. Anesthesia/sedation Level of anesthesia/sedation:  Moderate sedation (conscious sedation) Anesthesia/sedation administered by: Independent trained observer under attending supervision with continuous monitoring of the patient?s level of consciousness and physiologic status Total intra-service sedation time (minutes):  40 Left genitourinary catheter placement Local anesthesia was administered. A 21-gauge echogenic tipped needle needle was advanced into a lower pole calyx under ultrasound and fluoroscopy guidance. A sample of urine was obtained. An antegrade nephrostogram was performed. A wire was advanced, the tract was serially dilated and a nephrostomy tube was placed. Contrast injection was not performed. A gravity bag and FastBreak device were attached.  Genitourinary catheter placed:  10-Swedish all-purpose drain Findings: Moderate Left hydronephrosis and proximal hydroureter External catheter securement:  Non-absorbable suture and adhesive anchoring device Right genitourinary catheter placement Local anesthesia was administered. A 18-gauge echogenic tipped Hawkin's needle needle was advanced into an upper pole calyx under ultrasound and fluoroscopy guidance. A sample of urine was obtained. An antegrade nephrostogram was performed. A wire was advanced, the tract was serially dilated and a nephrostomy tube was placed. Contrast injection was not performed. A gravity bag and FastBreak device were attached. Genitourinary catheter placed: 10-Swedish all-purpose drain. Findings:  Marked right hydronephrosis with a tortuous proximal right kidney. . External catheter securement:  Non-absorbable suture and adhesive anchoring device Contrast Contrast agent:  IsoVue 300 Contrast volume (mL):  10 Radiation Dose Reference Air Kerma (Ka,r) :  17 mGy Dose Area Product/Kerma Area Product (DAP/JEAN PIERRE/PKA) :  202.86 cGy-cm2 Fluoroscopy Exposure Time :  2 minutes 6 seconds Fluorographic Images :  5 images Additional Details Additional description of procedure:  None Equipment details:  None Specimens removed:  Urine from both kidneys. Samples were sent for microbiology analysis. Estimated blood loss (mL):  Less than 10 Standardized report: [email or web address] Attestation Signer name:  Shanell Sainz M.D. I attest that I performed the entire procedure. I reviewed the stored images and agree with the report as written. CT CYSTOGRAM    Result Date: 9/15/2021  CT CYSTOGRAM INDICATION: Bladder wall defect COMPARISON: Noncontrast CT from earlier today, also on 7/23/2021 TECHNIQUE: Multiple axial images were obtained through the pelvis following the administration of 250 mL of Cysto-Conray to the patient's indwelling Bishop catheter.  Radiation dose reduction techniques were used for this study. Our CT scanners use one or all of the following: Automated exposure control, adjustment of the mA and/or kV according to patient size, iterative reconstruction. FINDINGS: Kidneys: Right hydronephrosis partially imaged. The left kidney is not seen within the field-of-view. Bladder: Large defect within the posterior wall bladder measuring approximately 2.9 cm. There is extravasation of contrast from the bladder through this defect associated with either tumor or abscess within the presacral space. Contrast fills this space and extends inferiorly to the left of the anal canal, approaching a perianal fistulous tract. The urinary bladder contains a Bishop catheter and demonstrates significant debris and air. Pelvic organs: Unremarkable appearance of the prostate and seminal vesicles. Gastrointestinal: Left lower quadrant colostomy. Peritoneum/retroperitoneum: Normal. Lymph nodes: Normal. Vessels: Aortoiliac atherosclerotic disease. Bones/Soft tissues: No aggressive osseous lesion. Contrast injected through the Bishop catheter into the bladder extravasates through a large defect within the posterior bladder wall, fills the presacral space and approaches an apparent perianal fistulous tract. CT ABD/PEL FOR RENAL STONE    Result Date: 9/15/2021  CT ABDOMEN AND PELVIS INDICATION:  Renal failure, hydronephrosis. History of rectal cancer. Multiple axial images were obtained through the abdomen and pelvis without intravenous or oral contrast.  Radiation dose reduction techniques were used for this study: All CT scans performed at this facility use one or all of the following: Automated exposure control, adjustment of the mA and/or kVp according to patient's size, iterative reconstruction. COMPARISON: 07/23/2021 FINDINGS: - KIDNEYS/URETERS: There are several small nonobstructing stones in the right kidney. There is bilateral hydroureteronephrosis, right greater than left. Ureters are dilated into the pelvis.   No definite obstructing stone is seen. Distal ureters are poorly visualized. - BLADDER: There is a Bishop catheter in place. There is high attenuation material in the bladder, probably hemorrhage. Air is also present in the bladder. There is an apparent defect in the posterior wall the bladder which communicates with a complex mass/fluid collection. Multiple bubbles of air are present which may be extraluminal. - LUNG BASES: No infiltrates or masses. - LIVER: Normal in size and appearance. - GALLBLADDER/BILE DUCTS: No gallstones or bile duct dilatation. - PANCREAS: Normal. - SPLEEN: Normal. - ADRENALS: Normal. - REPRODUCTIVE ORGANS: No pelvic masses. - BOWEL: Post resection of the distal colon. Left lower quadrant colostomy is seen. There is no significant bowel distention. - LYMPH NODES: No significant retroperitoneal, mesenteric, or pelvic adenopathy. - BONES: No fracture or significant bone lesion. - VASCULATURE: Moderate atherosclerosis. - OTHER: No ascites. 1.  Bilateral hydronephrosis, right greater than left. Tumor/inflammatory process in the pelvis is likely producing at least partial obstruction of the distal ureters. 2.  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 Consider follow-up pelvis CT with oral contrast and bladder contrast by Bishop.  ** If there are any questions about this report, I can be reached on [employer] or at [phone] **      All Micro Results     Procedure Component Value Units Date/Time    CULTURE, URINE [[id number]]  (Abnormal)  (Susceptibility) Collected: 09/16/21 1058    Order Status: Completed Specimen: Kidney Updated: 09/19/21 4944     Special Requests: LEFT        Culture result:       50,000-100,000 COLONIES/mL PSEUDOMONAS AERUGINOSA                  2000 COLONIES/mL 2ND GRAM NEGATIVE PRAKASH IDENTIFICATION AND SUSCEPTIBILITY TO FOLLOW          CULTURE, BLOOD [[id number]] Collected: 09/15/21 1533    Order Status: Completed Specimen: Blood Updated: 09/19/21 0637     Special Requests: --        RIGHT  HAND       Culture result: NO GROWTH 4 DAYS       CULTURE, BLOOD [[id number]] Collected: 09/15/21 1945    Order Status: Completed Specimen: Blood Updated: 09/19/21 0637     Special Requests: --        LEFT  FOREARM       Culture result: NO GROWTH 4 DAYS       CULTURE, URINE [[id number]] Collected: 09/16/21 1108    Order Status: Completed Specimen: Kidney Updated: 09/18/21 0718     Special Requests: RIGHT        Culture result: NO GROWTH 2 DAYS       CULTURE, URINE [[id number]]  (Abnormal)  (Susceptibility) Collected: 09/15/21 1531    Order Status: Completed Specimen: Cath Urine Updated: 09/18/21 0717     Special Requests: NO SPECIAL REQUESTS        Culture result:       >100,000 COLONIES/mL PSEUDOMONAS AERUGINOSA          CULTURE, BODY FLUID W Beto Barone [[id number]] Collected: 09/16/21 1145    Order Status: Canceled Specimen: Body Fluid from Miscellaneous Fluid     CULTURE, BODY FLUID W Beto Barone [[id number]] Collected: 09/16/21 1145    Order Status: Canceled Specimen: Body Fluid from Miscellaneous Fluid           Labs: Results:       BMP, Mg, Phos Recent Labs     09/19/21 0537 09/18/21  0350 09/17/21  0342    143 144   K 3.3* 3.4* 3.8   * 107 110*   CO2 26 26 27   AGAP 9 10 7   BUN 35* 52* 57*   CREA 1.44 1.97* 2.75*   CA 7.9* 8.0* 8.1*   GLU 93 94 106*      CBC Recent Labs     09/19/21 0537 09/18/21  0350 09/17/21  0342   WBC 9.5 12.5* 16.5*   RBC 2.66* 2.75* 2.94*   HGB 7.4* 7.7* 8.0*   HCT 23.4* 24.5* 25.5*    229 249      LFT No results for input(s): ALT, TBIL, AP, TP, ALB, GLOB, AGRAT in the last 72 hours.     No lab exists for component: SGOT, GPT   Cardiac Testing Lab Results   Component Value Date/Time     05/22/2016 11:15 AM    CK - MB 2.0 05/22/2016 11:15 AM    CK-MB Index 2.0 05/22/2016 11:15 AM    Troponin-I, Qt. <0.04 05/22/2016 11:15 AM      Coagulation Tests Lab Results   Component Value Date/Time Prothrombin time 13.7 09/15/2021 07:45 PM    Prothrombin time 16.3 (H) 08/27/2021 04:30 PM    Prothrombin time 15.5 (H) 08/27/2021 01:30 PM    INR 1.0 09/15/2021 07:45 PM    INR 1.3 08/27/2021 04:30 PM    INR 1.2 08/27/2021 01:30 PM    aPTT 28.1 08/27/2021 04:30 PM    aPTT 32.5 08/27/2021 01:30 PM    aPTT 62.9 (H) 08/27/2021 11:56 AM      A1c Lab Results   Component Value Date/Time    Hemoglobin A1c 5.2 08/03/2021 11:14 AM      Lipid Panel Lab Results   Component Value Date/Time    Cholesterol, total 145 12/03/2020 08:31 AM    HDL Cholesterol 67 12/03/2020 08:31 AM    LDL, calculated 61 12/03/2020 08:31 AM    LDL, calculated 76 12/05/2019 08:30 AM    VLDL, calculated 17 12/03/2020 08:31 AM    VLDL, calculated 18 12/05/2019 08:30 AM    Triglyceride 90 12/03/2020 08:31 AM    CHOL/HDL Ratio 4.2 05/20/2019 05:03 AM      Thyroid Panel Lab Results   Component Value Date/Time    TSH 3.600 12/03/2020 08:31 AM    TSH 1.340 12/05/2019 08:30 AM    TSH 1.420 05/20/2019 05:03 AM    TSH 3.160 04/13/2019 05:58 AM    T4, Total 9.1 02/28/2017 02:31 PM    T4, Total 9.6 09/02/2016 09:58 AM        Most Recent UA Lab Results   Component Value Date/Time    Color RED 09/15/2021 03:33 PM    Appearance TURBID 09/15/2021 03:33 PM    Specific gravity 1.017 06/13/2019 06:28 PM    pH (UA) 6.0 09/15/2021 03:33 PM    Protein 100 (A) 09/15/2021 03:33 PM    Glucose Negative 09/15/2021 03:33 PM    Ketone Negative 09/15/2021 03:33 PM    Bilirubin Negative 09/15/2021 03:33 PM    Blood LARGE (A) 09/15/2021 03:33 PM    Urobilinogen 0.2 09/15/2021 03:33 PM    Nitrites Negative 09/15/2021 03:33 PM    Leukocyte Esterase LARGE (A) 09/15/2021 03:33 PM    WBC >100 09/15/2021 03:33 PM    RBC >100 09/15/2021 03:33 PM    Epithelial cells 0-3 09/15/2021 03:33 PM    Bacteria TRACE 09/15/2021 03:33 PM    Casts 0 06/07/2021 09:33 AM    Crystals, urine 0 06/07/2021 09:33 AM    Mucus 0 06/07/2021 09:33 AM    Other observations RESULTS VERIFIED MANUALLY 09/15/2021 03:33 PM          All Labs from Last 24 Hrs:  Recent Results (from the past 24 hour(s))   CBC W/O DIFF    Collection Time: 09/19/21  5:37 AM   Result Value Ref Range    WBC 9.5 4.3 - 11.1 K/uL    RBC 2.66 (L) 4.23 - 5.6 M/uL    HGB 7.4 (L) 13.6 - 17.2 g/dL    HCT 23.4 (L) 41.1 - 50.3 %    MCV 88.0 79.6 - 97.8 FL    MCH 27.8 26.1 - 32.9 PG    MCHC 31.6 31.4 - 35.0 g/dL    RDW 14.3 11.9 - 14.6 %    PLATELET 815 363 - 607 K/uL    MPV 9.1 (L) 9.4 - 12.3 FL    ABSOLUTE NRBC 0.00 0.0 - 0.2 K/uL   METABOLIC PANEL, BASIC    Collection Time: 09/19/21  5:37 AM   Result Value Ref Range    Sodium 143 138 - 145 mmol/L    Potassium 3.3 (L) 3.5 - 5.1 mmol/L    Chloride 108 (H) 98 - 107 mmol/L    CO2 26 21 - 32 mmol/L    Anion gap 9 7 - 16 mmol/L    Glucose 93 65 - 100 mg/dL    BUN 35 (H) 8 - 23 MG/DL    Creatinine 1.44 0.8 - 1.5 MG/DL    GFR est AA >60 >60 ml/min/1.73m2    GFR est non-AA 51 (L) >60 ml/min/1.73m2    Calcium 7.9 (L) 8.3 - 10.4 MG/DL       Current Med List in Hospital:   Current Facility-Administered Medications   Medication Dose Route Frequency    carvediloL (COREG) tablet 3.125 mg  3.125 mg Oral BID WITH MEALS    cefepime (MAXIPIME) 2 g in 0.9% sodium chloride (MBP/ADV) 100 mL MBP  2 g IntraVENous Q24H    lactated Ringers infusion  75 mL/hr IntraVENous CONTINUOUS    sodium chloride (NS) flush 5-40 mL  5-40 mL IntraVENous Q8H    sodium chloride (NS) flush 5-40 mL  5-40 mL IntraVENous PRN    acetaminophen (TYLENOL) tablet 650 mg  650 mg Oral Q6H PRN    Or    acetaminophen (TYLENOL) suppository 650 mg  650 mg Rectal Q6H PRN    polyethylene glycol (MIRALAX) packet 17 g  17 g Oral DAILY PRN    ondansetron (ZOFRAN ODT) tablet 4 mg  4 mg Oral Q8H PRN    Or    ondansetron (ZOFRAN) injection 4 mg  4 mg IntraVENous Q6H PRN    famotidine (PEPCID) tablet 20 mg  20 mg Oral DAILY    amitriptyline (ELAVIL) tablet 100 mg  100 mg Oral QHS    clonazePAM (KlonoPIN) tablet 2 mg  2 mg Oral QHS    levothyroxine (SYNTHROID) tablet 125 mcg  125 mcg Oral ACB    OLANZapine (ZyPREXA) tablet 7.5 mg  7.5 mg Oral QHS    hydrALAZINE (APRESOLINE) tablet 50 mg  50 mg Oral Q6H PRN       No Known Allergies  Immunization History   Administered Date(s) Administered    COVID-19, PFIZER, MRNA, LNP-S, PF, 30MCG/0.3ML DOSE 02/15/2021, 03/08/2021    TB Skin Test (PPD) Intradermal 01/28/2014, 08/24/2014, 09/09/2014, 11/19/2015, 05/23/2016, 03/25/2019, 04/23/2019       Recent Vital Data:  Patient Vitals for the past 24 hrs:   Temp Pulse Resp BP SpO2   09/19/21 0924  92  (!) 146/82    09/19/21 0733 99.1 °F (37.3 °C) 90 18 (!) 143/76 95 %   09/19/21 0314 98.5 °F (36.9 °C) 82 18 (!) 159/80 96 %   09/18/21 2325 98.9 °F (37.2 °C) 88 17 (!) 157/83 96 %   09/18/21 1927 98.6 °F (37 °C) 94 17 (!) 161/80 98 %   09/18/21 1132 99.1 °F (37.3 °C) 88 16 (!) 142/82      Oxygen Therapy  O2 Sat (%): 95 % (09/19/21 0733)  Pulse via Oximetry: 95 beats per minute (09/16/21 1225)  O2 Device: None (Room air) (09/19/21 0750)  O2 Flow Rate (L/min): 2 l/min (09/16/21 1154)  ETCO2 (mmHg): 44 mmHg (09/16/21 1116)    Estimated body mass index is 19.01 kg/m² as calculated from the following:    Height as of this encounter: 5' 8\" (1.727 m). Weight as of this encounter: 56.7 kg (125 lb). Intake/Output Summary (Last 24 hours) at 9/19/2021 1022  Last data filed at 9/19/2021 0750  Gross per 24 hour   Intake 1671.25 ml   Output 1600 ml   Net 71.25 ml         Physical Exam:  General:    No overt distress. Ambulatory. Bishop and NTs in place. Head:  Normocephalic, atraumatic  Eyes:  Sclerae appear normal.  Pupils equally round. HENT:  Nares appear normal, no drainage. Moist mucous membranes  Neck:  No restricted ROM. Trachea midline  CV:   RRR. No m/r/g. Lungs: No wheezing. Even, unlabored on room air. Abdomen:   Soft, nontender, nondistended. Colostomy. Extremities: Warm and dry. No cyanosis or clubbing. Skin:     No rashes.   Normal coloration  Neuro:  Cranial nerves II-XII grossly intact. Psych:  Normal mood and affect. Signed:  Sudhakar Lopez NP    Part of this note may have been written by using a voice dictation software. The note has been proof read but may still contain some grammatical/other typographical errors.

## 2021-09-19 NOTE — PROGRESS NOTES
Pt is for discharge home today with family. Per chart referral was made to [facility] for follow up home care as ordered. No additional CM orders received or supportive care needs expressed at this time. Care Management Interventions  PCP Verified by CM: Yes (Dr Yumiko Adams)  Transition of Care Consult (CM Consult): [address]: Yes  Discharge Durable Medical Equipment: No  Physical Therapy Consult: Yes  Occupational Therapy Consult: Yes  Support Systems: Friend/Neighbor  Confirm Follow Up Transport: Family  The Plan for Transition of Care is Related to the Following Treatment Goals : Pt will need home care services/rehab to return to his functional baseline.   The Patient and/or Patient Representative was Provided with a Choice of Provider and Agrees with the Discharge Plan?: Yes  Name of the Patient Representative Who was Provided with a Choice of Provider and Agrees with the Discharge Plan: pt  Freedom of Choice List was Provided with Basic Dialogue that Supports the Patient's Individualized Plan of Care/Goals, Treatment Preferences and Shares the Quality Data Associated with the Providers?: Yes  Discharge Location  Discharge Placement: Home with home health

## 2021-09-19 NOTE — PROGRESS NOTES
Admit Date: 9/15/2021    Subjective:     Stephanie Marroquin is resting well. Denies pain. Bilateral NT in place with clear urine output. Brito in place with dark luigi urine OP. Urine culture with pseudomonas. On IV cefepime. Labs improving except for slow downward drift in Hb likely representative of chronic anemia rather than acute blood loss. Objective:     Patient Vitals for the past 8 hrs:   BP Temp Pulse Resp SpO2   09/19/21 0314 (!) 159/80 98.5 °F (36.9 °C) 82 18 96 %   09/18/21 2325 (!) 157/83 98.9 °F (37.2 °C) 88 17 96 %     09/18 1901 - 09/19 0700  In: 825 [I.V.:825]  Out: 1400 [Urine:1400]  09/17 0701 - 09/18 1900  In: 5506.5 [P.O.:118;  I.V.:5388.5]  Out: 3500 [Urine:3500]    Physical Exam:  GENERAL: alert, cooperative, no distress  LUNG: clear to auscultation bilaterally  HEART: regular rate and rhythm, S1, S2   ABDOMEN: soft, non-tender, colostomy with stool  NEUROLOGIC: AOx3  : B NT/brito catheter in place      Data Review   Recent Results (from the past 24 hour(s))   CBC W/O DIFF    Collection Time: 09/19/21  5:37 AM   Result Value Ref Range    WBC 9.5 4.3 - 11.1 K/uL    RBC 2.66 (L) 4.23 - 5.6 M/uL    HGB 7.4 (L) 13.6 - 17.2 g/dL    HCT 23.4 (L) 41.1 - 50.3 %    MCV 88.0 79.6 - 97.8 FL    MCH 27.8 26.1 - 32.9 PG    MCHC 31.6 31.4 - 35.0 g/dL    RDW 14.3 11.9 - 14.6 %    PLATELET 002 730 - 253 K/uL    MPV 9.1 (L) 9.4 - 12.3 FL    ABSOLUTE NRBC 0.00 0.0 - 0.2 K/uL   METABOLIC PANEL, BASIC    Collection Time: 09/19/21  5:37 AM   Result Value Ref Range    Sodium 143 138 - 145 mmol/L    Potassium 3.3 (L) 3.5 - 5.1 mmol/L    Chloride 108 (H) 98 - 107 mmol/L    CO2 26 21 - 32 mmol/L    Anion gap 9 7 - 16 mmol/L    Glucose 93 65 - 100 mg/dL    BUN 35 (H) 8 - 23 MG/DL    Creatinine 1.44 0.8 - 1.5 MG/DL    GFR est AA >60 >60 ml/min/1.73m2    GFR est non-AA 51 (L) >60 ml/min/1.73m2    Calcium 7.9 (L) 8.3 - 10.4 MG/DL       Assessment:     Principal Problem:    [facility]BEKAH [facility]acute kidney [facility]) ([facility] (9/15/2021)    Active Problems:    Hypothyroid (6/14/2019)      Overview: stable w/med      Bilateral hydronephrosis (5/11/2021)      Hyperkalemia (9/15/2021)      Hematuria (9/15/2021)      Urinary retention (9/15/2021)      66 y.o. male with a past medical hx of kidney stones, pelvic abscess and urinary retention who had a completion proctectomy by Dr. Nelsy Gay. A cystotomy occurred during surgery and was repaired intra-operatively by Dr. Nelsy Gay. He presented for follow-up on 9/10 with cystogram performed in office showing no bladder injury with complete healing of repair and L grade 3 vesico-ureteral reflux. He presents to ER 9/15 with c/o fatigue and weakness. Cn found to be 6.09 (0.9 on 8/31). Brito placed with 1L of blood tinged UOP. Per notes, RN manually irrigated brito. Renal US shows bladder collapsed with brito in place with possible sediment in bladder and bilateral hydro with R>L with bilateral renal stones. We are consulted for urinary retention,  Bilateral hydronephrosis, gross hematuria. CT AP shows R>L hydro with inflammatory process in pelvis producing partial obs of ureters, probable defect in posterior bladder. CT cystogram shows extravasation  through a large defect within the posterior bladder wall, fills the presacral space and approaches an apparent perianal fistulous tract. Had fever last night up to 102.7. UA with >100 WBC, >100 RBC, trace bacteria, urine culture added on, WBC 19.9.    9/17- labs improving, UC growing GNR, on rocephin. SP bilateral NT placement 9/16. Afebrile. 9/18 - labs improving. On cefepime. UC with GNR.  9/19 - labs improving except for slow downward drift in Hb likely representative of chronic anemia. On cefepime. UC with pseudomonas    Plan:     Continue bilateral NT to drainage. Continue brito catheter to drainage.     Continue antibiotics for complicated UTI treatment per primary team.    Transfuse PRN low Hb per primary team    Recommend discharge home with all tubes in place and will arrange outpatient cystoscopy in about 2-3 weeks to assess for bladder healing. My office will call patient to schedule. Will sign off at this time. Call with questions. Jack Cabrera M.D.     [facility]  [address], [facility] [address] [facility]  Phone: [phone]  Fax: [phone]

## 2021-09-19 NOTE — DISCHARGE INSTRUCTIONS
Patient Education        Learning About Hydronephrosis  What is hydronephrosis? Hydronephrosis is swelling of the kidneys. It is caused by a buildup of urine. This condition can happen if a tube that drains urine from your kidneys is blocked. The blockage can come from within the urinary tract or from pressure outside of the tract. Pregnancy is an example of an outside (external) cause. This condition is often caused by a blockage such as a kidney stone, tumor, or blood clot. It also can be caused by a problem in your urinary system that you were born with (congenital problem). What are the symptoms? Some of the common symptoms are:  · Pain in one or both sides. · Stomach pain. · Blood in your urine. Some people have no symptoms. How is it diagnosed? Your doctor will do an ultrasound to look for a blockage in your urinary system. An ultrasound allows your doctor to see a picture of the organs and other structures in your belly (abdomen). You also may need blood and urine tests. How is it treated? Your treatment depends on the cause of the swelling. If it is caused by a blockage, your treatment will depend on the type of blockage you have. If the blockage is caused by a kidney stone, you may wait for the stone to pass. If hydronephrosis happens during pregnancy, it usually clears up on its own. You may need to have urine drained from your bladder or kidneys. A urinary catheter is a small, flexible tube that can be inserted through the urethra and into the bladder, allowing urine to drain. A nephrostomy catheter is a thin tube placed into your kidney to drain urine. Sometimes surgery is needed to clear the blockage. If you have a blockage, you should begin to feel better after the blockage is gone. Many people recover and have no long-term problems. But some may have kidney damage. If hydronephrosis was left untreated for a long time, the damage can be severe.  Severe damage will require further treatment. Follow-up care is a key part of your treatment and safety. Be sure to make and go to all appointments, and call your doctor if you are having problems. It's also a good idea to know your test results and keep a list of the medicines you take. Where can you learn more? Go to http://www.gray.com/  Enter S386 in the search box to learn more about \"Learning About Hydronephrosis. \"  Current as of: December 17, 2020               Content Version: 13.0  © 2006-2021 [employer]. Care instructions adapted under license by [employer] (which disclaims liability or warranty for this information). If you have questions about a medical condition or this instruction, always ask your healthcare professional. [address] any warranty or liability for your use of this information. Patient Education        Nephrostomy Tube Care: Care Instructions  Your Care Instructions     A nephrostomy tube is a thin catheter placed into your kidney to drain urine. You may have one tube in a kidney or two tubes, one in each kidney. The urine collects in a bag attached to the tube. In most cases, the bag is attached to your leg. Sometimes the catheter tube has a valve that lets you drain the urine into the toilet or other container. You may need a nephrostomy tube if you have a blockage or a hole in your urinary tract. The blockage may be caused by a kidney stone, infection, scar tissue, or a tumor. If you have only one tube, you still need to urinate. Your other kidney will still produce urine that will drain into your bladder. Having a nephrostomy tube in for a long time increases the risk of getting an infection. Nephrostomy tube care focuses on preventing infection. Follow-up care is a key part of your treatment and safety. Be sure to make and go to all appointments, and call your doctor if you are having problems.  It's also a good idea to know your test results and keep a list of the medicines you take. How can you care for yourself at home? · Wash your hands before you handle the nephrostomy tube. · Clean the area around the tube with soap and water every day. · Keep the drainage bag lower than your kidney to keep urine from backing up. · If you have been told you can reuse your bag, you can clean the bag after removing it from the tube. Use another container to collect your urine while you clean the bag. To clean the bag, fill it with 2 parts vinegar to 3 parts water, and let it stand for 20 minutes. Then empty it out, and let it air dry. · Empty the drainage bag before it is completely full or every 2 to 3 hours. · Do not swim or take baths while you have a nephrostomy tube. You can shower after wrapping the end of the nephrostomy tube with plastic wrap. · Change the dressing around the nephrostomy tube about every 3 days or when it gets wet or dirty. A nurse will teach you how to change the dressing. When should you call for help? Call your doctor now or seek immediate medical care if:    · You have new or worse symptoms of a kidney infection. These may include:  ? Pain or burning when you urinate. ? A frequent need to urinate without being able to pass much urine. ? Pain in the flank, which is just below the rib cage and above the waist on either side of the back. ? Blood in the urine. ? A fever.     · You are vomiting or nauseated.     · Your tube leaks.     · Urine does not collect in the drainage bag.     · You have pain or bleeding around the tube. Watch closely for changes in your health, and be sure to contact your doctor if:    · You do not get better as expected. Where can you learn more? Go to http://www.gray.com/  Enter V838 in the search box to learn more about \"Nephrostomy Tube Care: Care Instructions. \"  Current as of: December 17, 2020               Content Version: 13.0  © 7901-5743 [employer]. Care instructions adapted under license by [employer] [id number] (which disclaims liability or warranty for this information). If you have questions about a medical condition or this instruction, always ask your healthcare professional. [address]  any warranty or liability for your use of this information. Patient Education        Learning About How to Care for a Person's Indwelling Urinary Catheter  Introduction     A urinary catheter is a flexible plastic tube that's used to drain urine from the bladder when a person can't urinate. The catheter is placed into the bladder by inserting it through the urethra. The urethra is the opening that carries urine from the bladder to the outside of the body. When the catheter is in the bladder, a small balloon is used to keep the catheter in place. The catheter lets urine drain from the bladder into a collection bag. Urinary catheters can be used in both men and women. A catheter that stays in place for a longer period of time is called an indwelling catheter. A catheter may be needed because of certain medical conditions. These include an enlarged prostate or problems controlling urine. It may be used after surgery on the pelvis or urinary tract. Urinary catheters are also used when the lower part of the body is paralyzed. When helping a loved one with a catheter, try to be relaxed. Caring for a catheter can be embarrassing for both of you. If you are calm and don't seem embarrassed, the person may feel more comfortable. How do you take care of the catheter? Wear disposable gloves when handling someone's catheter. Make sure to follow all of the instructions the doctor has given. And always wash your hands before and after you're done. Here are some other things to remember when caring for someone's catheter:  · Make sure that urine is running out of the catheter into the urine collection bag.  And make sure that the catheter tubing does not get twisted or bent. · Keep the urine collection bag below the level of the bladder. At night it may be helpful to hang the bag on the side of the bed. · Make sure that the urine collection bag does not drag and pull on the catheter. · It's okay to shower with a catheter and urine collection bag in place, unless the doctor says not to. · Check for swelling or signs of infection in the area around the catheter. Signs of infection include pus and irritated, swollen, red, or tender skin. · Clean the area around the catheter daily with soap and water. Dry with a clean towel afterward. · Do not apply powder or lotion to the skin around the catheter. · Do not tug or pull on the catheter. · Sexual intercourse may still be possible for people who wear a catheter. It's best to talk with a doctor about options. How do you empty the bag? The urine collection bag needs to be emptied regularly. It's best to empty the bag when it's about half full or at bedtime. If the doctor has asked you to measure the amount of urine, do that before you empty the urine into the toilet. When you are ready to empty the bag, follow these steps:  1. Put on disposable gloves. 2. Remove the drain spout from its sleeve at the bottom of the collection bag. Open the valve on the spout. 3. Let the urine flow out of the bag and into the toilet or a container. Do not let the tubing or drain spout touch anything. 4. After you empty the bag, close the valve and put the drain spout back into its sleeve. 5. Remove your gloves, and throw them away. 6. Wash your hands with soap and water. How do you care for someone after the catheter is removed? After the catheter is taken out, the person may have trouble urinating. If this happens, try helping them sit in a few inches of warm water (sitz bath). If the urge to urinate comes during the sitz bath, it may be easier for them to urinate while still in the bath.   Some burning may happen the first few times the person urinates. If the burning lasts longer, it may be a sign of an infection. If the catheter causes irritation or a rash, wearing loose, cotton underwear may help. Watch closely for changes in the person's health. Be sure to contact their doctor if you notice any problems or if they are unable to urinate at all. Where can you learn more? Go to http://www.gray.com/  Enter X535 in the search box to learn more about \"Learning About How to Care for a Person's Indwelling Urinary Catheter. \"  Current as of: March 17, 2021               Content Version: 13.0  © 5386-4458 [employer]. Care instructions adapted under license by [employer] (which disclaims liability or warranty for this information). If you have questions about a medical condition or this instruction, always ask your healthcare professional. [place] [address] any warranty or liability for your use of this information.

## 2021-09-19 NOTE — PROGRESS NOTES
Pt seen prior to discharge. Had some concerns about drain fixation devices and sacral dressing. Stool in ostomy bag  Staples have been removed from thigh- looks good    Reviewed process of his current bladder leak- repaired cystotomy, negative office cysto by Urology, cath removed, bladder distention due to neurogenic bladder. He expresses understanding, but then proceeds to explain to me that he anticipates his bladder working normal when the nephrostomy tubes are removed. No acute general surgical issues.

## 2021-09-20 ENCOUNTER — HOME CARE VISIT (OUTPATIENT)
Dept: HOME HEALTH SERVICES | Facility: HOME HEALTH | Age: 76
End: 2021-09-20

## 2021-09-20 ENCOUNTER — HOME CARE VISIT (OUTPATIENT)
Dept: SCHEDULING | Facility: HOME HEALTH | Age: 76
End: 2021-09-20
Payer: MEDICARE

## 2021-09-20 VITALS
TEMPERATURE: 99.1 F | DIASTOLIC BLOOD PRESSURE: 70 MMHG | OXYGEN SATURATION: 96 % | HEART RATE: 88 BPM | SYSTOLIC BLOOD PRESSURE: 138 MMHG

## 2021-09-20 LAB
ACC. NO. FROM MICRO ORDER, ACCP: ABNORMAL
BACTERIA SPEC CULT: ABNORMAL
BACTERIA SPEC CULT: ABNORMAL
INTERPRETATION: ABNORMAL
KPC (CARBAPENEM RESISTANCE GENE): NOT DETECTED
PSEUDOMONAS AERUGINOSA: DETECTED
SERVICE CMNT-IMP: ABNORMAL

## 2021-09-20 PROCEDURE — 400013 HH SOC

## 2021-09-20 PROCEDURE — 3331090001 HH PPS REVENUE CREDIT

## 2021-09-20 PROCEDURE — A6248 HYDROGEL DRSG GEL FILLER: HCPCS

## 2021-09-20 PROCEDURE — 400018 HH-NO PAY CLAIM PROCEDURE

## 2021-09-20 PROCEDURE — 3331090002 HH PPS REVENUE DEBIT

## 2021-09-20 PROCEDURE — A6252 ABSORPT DRG >16 <=48 W/O BDR: HCPCS

## 2021-09-20 PROCEDURE — G0299 HHS/HOSPICE OF RN EA 15 MIN: HCPCS

## 2021-09-20 PROCEDURE — A6402 STERILE GAUZE <= 16 SQ IN: HCPCS

## 2021-09-20 PROCEDURE — MED12145

## 2021-09-20 PROCEDURE — A4358 URINARY LEG OR ABDOMEN BAG: HCPCS

## 2021-09-20 PROCEDURE — A4452 WATERPROOF TAPE: HCPCS

## 2021-09-20 NOTE — CASE COMMUNICATION
Patient admitted to hospital on first day of new certification period. Patient will be discharged from our services at this time. If patient needs homecare services at time of discharge from hospital, [facility] will re-evaluate the patient for admission to homecare services once new order received.

## 2021-09-21 ENCOUNTER — HOME CARE VISIT (OUTPATIENT)
Dept: SCHEDULING | Facility: HOME HEALTH | Age: 76
End: 2021-09-21
Payer: MEDICARE

## 2021-09-21 ENCOUNTER — HOSPITAL ENCOUNTER (EMERGENCY)
Age: 76
Discharge: HOME | End: 2021-09-21
Attending: EMERGENCY MEDICINE
Payer: MEDICARE

## 2021-09-21 VITALS
TEMPERATURE: 99 F | HEART RATE: 80 BPM | DIASTOLIC BLOOD PRESSURE: 68 MMHG | SYSTOLIC BLOOD PRESSURE: 118 MMHG | OXYGEN SATURATION: 92 %

## 2021-09-21 VITALS
RESPIRATION RATE: 18 BRPM | OXYGEN SATURATION: 95 % | HEART RATE: 94 BPM | HEIGHT: 68 IN | SYSTOLIC BLOOD PRESSURE: 113 MMHG | TEMPERATURE: 99.1 F | WEIGHT: 130 LBS | BODY MASS INDEX: 19.7 KG/M2 | DIASTOLIC BLOOD PRESSURE: 73 MMHG

## 2021-09-21 DIAGNOSIS — T83.098A MALFUNCTION OF NEPHROSTOMY TUBE (HCC): Primary | ICD-10-CM

## 2021-09-21 LAB
BACTERIA SPEC CULT: ABNORMAL
BACTERIA SPEC CULT: NORMAL
GRAM STN SPEC: ABNORMAL
SERVICE CMNT-IMP: ABNORMAL
SERVICE CMNT-IMP: NORMAL

## 2021-09-21 PROCEDURE — G0299 HHS/HOSPICE OF RN EA 15 MIN: HCPCS

## 2021-09-21 PROCEDURE — G0151 HHCP-SERV OF PT,EA 15 MIN: HCPCS

## 2021-09-21 PROCEDURE — 99283 EMERGENCY DEPT VISIT LOW MDM: CPT

## 2021-09-21 PROCEDURE — 3331090002 HH PPS REVENUE DEBIT

## 2021-09-21 PROCEDURE — 3331090001 HH PPS REVENUE CREDIT

## 2021-09-21 NOTE — ED NOTES
Arrives via ems from home. Pt here a few days ago and had catheter bags placed in both kidneys. Left sided kidney bag not draining, filled with blood. 130/70 O2 96% RA     Pt reports in triage that the bag is daining but the clamp itself is clogged. When trying to empty out drainage clamp it clogged and urine will not drain.  Attempted to in triage- no success

## 2021-09-21 NOTE — ED NOTES
Able to drain 200ml bloody urine from left ureter bag. 100ml yellow drained from right tube. Pt stated he was here because he wasn't able to drain the left bag. Blood clots noted in bag but pt state this is not abnormal for him.

## 2021-09-21 NOTE — CASE COMMUNICATION
S: Lele Wei being evaluated for admission to [address] [place] [address].             B: Refferal order states the following: Patient admitted to [facility] on 9/15/21, for an REBEKAH. In the ED, renal ultrasound showed bilateral hydronephrosis with bilateral renal stones. Bishop catheter placed and returned 1 liter of urine blood-tinged. CT abdomen/pelvis showed bilateral hydronephrosis right greater than left, tumor/inflammatory process in the pelvis likely producing at least partial obstruction of the distal ureters and probable defect in the posterior bladder communicating with tumor/abscess in the presacral region. Nephrology consulted. Creatinine trending downward. bilateral ne phrostomy tube placed to divert urine away from bladder and to promote closure /healing of the posterior wall defect. Patient started on IV Rocephin. PMH of :Acute DVT, Anxiety, Fecal incontinence,Former smoker, Rectal cancer, Hypothyroid, Insomnia, Major depression, Osteoarthritis             A: Upon entry to home, SN allowed access by patient. Consents and emergency care plan signed. OASIS walk and documentation complete. Education done, see interventions.  Complete medication reconciliation. Patient did not have new discharge medications in home. This RN instructed patient that these medications need to be picked up as soon as possible, especially the antibiotic, and medications need to be taken as ordered. Medications reviewed with patient verbally, patient able to teach back how and when to contact [facility] vs 911.  Patient reported 1 fall on 9/19/21 while coming back from the hospital (see fall documentation). Patient does not have a caregiver in the home, but reports he has a friend who can assist with medical decision making if needed. pt is a/o x4, vss, respirations even and unlabored. Nephrostomy sites clean, dry, and intact, with clear/yellow drainage to nephrostomy bags. Pt able to empty. Bishop with minimal bloody drainage.  Pt requesting a leg bag due to the bedside brito bag being a tripping hazard. Patient with an abrasion to his left forearm as a result of his fall, as well as an incision along the perineum from his LAR surgery (see wound addendum). Patient will need CBC and BMP by 9/23/21. He is scheduled for an outpt cysto in 2 weeks, and has an IR proceedure scheduled for October 28th.           R: SN frequency of 6w1, 3w4, 2w3, 3prn for skilled assessment and monitoring and education related to medications, nephrostomy, brito, and wound care.  Other disciplines to eval and treat: PT

## 2021-09-21 NOTE — ED PROVIDER NOTES
27-year-old male who presents to the emergency room with chief complaint of being unable to drain his left nephrostomy bag. This was placed approximately 1 week ago by interventional radiology, states that it became clogged earlier today with some blood clots. He reports that blood clots in this bag are not abnormal for him however the clogging of the bag and tubing is. He has no other medical complaints. He has no pain associated with this.            Past Medical History:   Diagnosis Date    Acute deep vein thrombosis (DVT) of non-extremity vein 5/20/2019    Family history of malignant neoplasm of gastrointestinal tract     mother colon/ovarian cancer 67    Fecal incontinence     LAR syndrome    Former cigarette smoker     History of rectal cancer     Hypothyroid     stable w/med    Infection and inflammatory reaction due to other internal prosthetic devices, implants and grafts, subsequent encounter 2017    abd wound/mesh colostomy    Insomnia     takes meds    Major depression     Osteoarthritis, hand     Personal history of colonic polyps 9/2013    x 1    Personal history of malignant neoplasm of rectum, rectosigmoid junction, and anus 9/2013    Psychiatric disorder     anxiety       Past Surgical History:   Procedure Laterality Date    COLONOSCOPY N/A 2/12/2018    COLONOSCOPY / BMI=21 performed by Jhon Vega MD at [facility] N/A 3/5/2021    COLONOSCOPY/BMI 19 performed by Ab Mcnulty MD at [address]  2/12/2018         COLONOSCOPY THRU STOMA,LESN RMVL W/SNARE  3/5/2021         ENDOSCOPY, COLON, DIAGNOSTIC  last 2/3/15    Emily--no polyps--3 year recall    HX COLOSTOMY  5/11/16    HX GI  4/2016    Sacral nerve stimlator lead trial (unsucessful) and removal    HX HERNIA REPAIR  3/2015, 5/2016    HX HERNIA REPAIR      and Colostomy in May    HX OTHER SURGICAL  10/7/2013    Emily---endorectal us    HX OTHER SURGICAL Bilateral     cataracts  2017    HX POLYPECTOMY      HX TOTAL COLECTOMY  2013    Emily--lap LAR with coloproctostomy, mobilization splenic flexure, diverting loop ileostomy    HX TOTAL COLECTOMY Right 2014    for leak    HX VASCULAR ACCESS Left     single lumen port/subsequently removed    IR CHANGE ABSCESS / CYST CATHETER  2021    IR CHANGE ABSCESS / CYST CATHETER  2021    IR CHANGE ABSCESS / CYST CATHETER  2021    IR CHANGE ABSCESS / CYST CATHETER  2021    IR INJ ABS CYST VIA CATHETER / TUBE  2021    IR INJ ABS CYST VIA CATHETER / TUBE  2021    IR INSERT TUNL CVC W/O PORT OVER 5 YR  2019    IR NEPHROSTOMY PERC LT PLC CATH  SI  2021    IR REPLACE CVC TUNNELED W/O PORT  2019         Family History:   Problem Relation Age of Onset    Cancer Mother         colon and ovarian    Cancer Brother         prostate    Alcohol abuse Brother     Cancer Maternal Grandmother         bone    Alcohol abuse Father     Alcohol abuse Sister     Stroke Paternal Grandfather        Social History     Socioeconomic History    Marital status: SINGLE     Spouse name: Not on file    Number of children: Not on file    Years of education: Not on file    Highest education level: Not on file   Occupational History    Not on file   Tobacco Use    Smoking status: Former Smoker     Packs/day: 1.00     Years: 4.00     Pack years: 4.00     Types: Cigarettes     Quit date: 1963     Years since quittin.8    Smokeless tobacco: Never Used   Substance and Sexual Activity    Alcohol use: Not Currently     Alcohol/week: 11.7 standard drinks     Types: 14 Cans of beer per week     Comment: advised stop drinking given issues    Drug use: No    Sexual activity: Never   Other Topics Concern    Not on file   Social History Narrative    Not on file     Social Determinants of Health     Financial Resource Strain:     Difficulty of Paying Living Expenses:    Food Insecurity:     Worried About Running Out of Food in the Last Year:     [address] in the Last Year:    Transportation Needs:     Lack of Transportation (Medical):  Lack of Transportation (Non-Medical):    Physical Activity:     Days of Exercise per Week:     Minutes of Exercise per Session:    Stress:     Feeling of Stress :    Social Connections:     Frequency of Communication with Friends and Family:     Frequency of Social Gatherings with Friends and Family:     Attends Denominational Services:     Active Member of Clubs or Organizations:     Attends Club or Organization Meetings:     Marital Status:    Intimate Partner Violence:     Fear of Current or Ex-Partner:     Emotionally Abused:     Physically Abused:     Sexually Abused: ALLERGIES: Patient has no known allergies. Review of Systems   Constitutional: Negative for chills and fever. HENT: Negative for facial swelling. Respiratory: Negative for chest tightness and shortness of breath. Cardiovascular: Negative for chest pain. Gastrointestinal: Negative for abdominal pain, nausea and vomiting. Genitourinary: Positive for hematuria. Musculoskeletal: Negative for myalgias. Neurological: Negative for headaches. Psychiatric/Behavioral: Negative for confusion. All other systems reviewed and are negative. Vitals:    09/21/21 1647   BP: 113/73   Pulse: 94   Resp: 18   Temp: 99.1 °F (37.3 °C)   SpO2: 95%   Weight: 59 kg (130 lb)   Height: 5' 8\" (1.727 m)            Physical Exam  Vitals and nursing note reviewed. Constitutional:       Appearance: Normal appearance. HENT:      Head: Normocephalic and atraumatic. Mouth/Throat:      Mouth: Mucous membranes are moist.   Eyes:      Pupils: Pupils are equal, round, and reactive to light. Cardiovascular:      Rate and Rhythm: Normal rate and regular rhythm. Pulmonary:      Effort: No respiratory distress. Breath sounds: Normal breath sounds. Abdominal:      Palpations: Abdomen is soft. Tenderness: There is no abdominal tenderness. There is no guarding. Comments: Colostomy bag in place with stool present. Right nephrostomy tube with yellow urine, left nephrostomy tube with hematuric urine, blood clots present. Skin:     General: Skin is warm and dry. Neurological:      Mental Status: He is alert and oriented to person, place, and time. Mental status is at baseline. Psychiatric:         Mood and Affect: Mood normal.          MDM  Number of Diagnoses or Management Options  Diagnosis management comments: 57-year-old male who presented with chief complaint of having a clogged nephrostomy tube on the left side. These have been in place for a week, has had hematuric urine draining from his left nephrostomy tube however the tube became clogged today. Upon arrival here to emergency room, the nurse was able to flush the left nephrostomy tube and it now drains without any clogging. Patient will be discharged home with instructions to follow-up with his primary care physician, he already has urology, IR follow-up in place. He understand indications for which return here to the emergency room. Voice dictation software was used during the making of this note. This software is not perfect and grammatical and other typographical errors may be present. This note has been proofread, but may still contain errors.    Mathew Corbett PA-C     Risk of Complications, Morbidity, and/or Mortality  Presenting problems: low  Diagnostic procedures: low  Management options: low    Patient Progress  Patient progress: improved         Procedures

## 2021-09-21 NOTE — ED NOTES
I have reviewed discharge instructions with the patient. The patient verbalized understanding. Patient left ED via Discharge Method: ambulatory to Home with se.f     Opportunity for questions and clarification provided. Patient given 0 scripts. To continue your aftercare when you leave the hospital, you may receive an automated call from our care team to check in on how you are doing. This is a free service and part of our promise to provide the best care and service to meet your aftercare needs.  If you have questions, or wish to unsubscribe from this service please call [phone]. Thank you for Choosing our [employer] [facility] Emergency Department.

## 2021-09-21 NOTE — PROGRESS NOTES
BCx 1/2 bottles positive for pseudomas sensitive to cipro which patient was sent home with for pseudomanal UTI.

## 2021-09-22 ENCOUNTER — PATIENT OUTREACH (OUTPATIENT)
Dept: CASE MANAGEMENT | Age: 76
End: 2021-09-22

## 2021-09-22 ENCOUNTER — HOME CARE VISIT (OUTPATIENT)
Dept: HOME HEALTH SERVICES | Facility: HOME HEALTH | Age: 76
End: 2021-09-22
Payer: MEDICARE

## 2021-09-22 ENCOUNTER — HOME CARE VISIT (OUTPATIENT)
Dept: SCHEDULING | Facility: HOME HEALTH | Age: 76
End: 2021-09-22
Payer: MEDICARE

## 2021-09-22 VITALS
DIASTOLIC BLOOD PRESSURE: 76 MMHG | OXYGEN SATURATION: 98 % | RESPIRATION RATE: 16 BRPM | TEMPERATURE: 97.7 F | HEART RATE: 81 BPM | SYSTOLIC BLOOD PRESSURE: 130 MMHG

## 2021-09-22 PROCEDURE — 3331090002 HH PPS REVENUE DEBIT

## 2021-09-22 PROCEDURE — G0299 HHS/HOSPICE OF RN EA 15 MIN: HCPCS

## 2021-09-22 PROCEDURE — 3331090001 HH PPS REVENUE CREDIT

## 2021-09-22 NOTE — PROGRESS NOTES
Referrals to [facility] and [facility] for patient at request of ambulatory RN-CM [facility] : [facility].

## 2021-09-22 NOTE — COMMUNITY CARE MANAGEMENT
Patient Demographics    Name Patient ID [facility] Sex Birth Date   Finley Boxer [id number] Male [birth year] (66 yrs)   Address Phone Email     [address] [facility] [phone] (J)   [phone] (M) [email or web address]     Reg Status PCP Date Last Verified Next Review Date    Verified Madina Win  [phone] 21    Marital Status       SINGLE       Admitted/Arrived from    [place] [id number] Account    Name Acct ID Class Status Primary Coverage   Finley Boxer [id number] EMERGENCY [place] Billed [place]          Guarantor Account (for Hospital Account [de-identified])    Name Relation to Pt Service Area Active? Acct Type   CaraYinLinn Yuri [facility] Yes Personal/Family   Address Phone     [address] [phone])            Coverage Information (for Hospital Account [de-identified])    1. [place] [address] [facility] MEDICARE PART A AND B    F/O Payor/Plan Subscriber  Subscriber Sex Precert #   [place] [address] [facility] MEDICARE PART A AND B [birth year] M    Subscriber Subscriber #   Clark Boxer [id number]   Grp # Group Name       Address Phone   [address] [place] [address] [phone]   Policy Number Status Effective Date Benefits Phone   [id number] -  -   Auth/Cert      2. AARP/BSHSI AARP SUPPLEMENT MEDICARE    F/O Payor/Plan Subscriber  Subscriber Sex Precert #   AARP/BSHSI AARP SUPPLEMENT MEDICARE [birth year] M    Subscriber Subscriber #   Finley Boxer [id number]   Grp # Group Name    [facility]   Address Phone   Kenan Taran [id number]   [address]    Policy Number Status Effective Date Benefits Phone   [id number] -  -   Auth/Cert             Diagnosis     Codes Comments   Malfunction of nephrostomy tube ([facility] [address]  ICD-10-CM: K61.139F   ICD-9-CM: 559. 5           Admission Information    Unit/Room/Bed: [facility] EMERGENCY DEPT/ERH/H Service: EMERGENCY   Admitting provider:  Phone:    Attending provider:  Phone:    PCP: David Wheat DO Phone: [phone]   Admission dx:  Patient class: E   Admission type: ER     Documents on File     Status Date Received Description   Documents for the Patient    License Received   License   Insurance Card Received () 18 [employer]   2nd Insurance Card Received 18 [address]   3rd Insurance Card Not Received     Patient Documents Not Received     SC-Patient Rights Signed () 13 SC-Advance Directive Ack   Financial Forms Not Received     SAD-HIPAA Received () 13    SAD-Consent Signed 08/27/15    SAD-Financial Consent Signed     SAD- License Received  SAD- License   [id number] Insurance Card Received 08/27/15 SAD-1st Insurance Card   SAD-2nd Insurance Card Received 08/27/15 SAD-2nd Insurance Card   [address] Not Received     SAD-Rel of Information Not Received     SAD-Miscellaneous Not Received     SAD-Good Help ACO Not Received     ACP-Advance Directive Not Received     SC-Patient Rights Signed () 13 SC-Advance Directive Ack   SC-Patient Rights Signed () 14    Patient Secure Received () 14    Ambulatory Finance Not Received     SC-Patient Rights Signed () 14 SC-Advance Directive Ack   My Chart Not Received     SC-Patient Rights Signed () 14 has LW and HCPOA not on file   Patient Documents   SAD- Discharge/Transfer Summaries 39   CE Auth Form (Scanned) Not Received     SC-Patient Rights Signed () 14 SC-Advance Directive Ack   SC-Patient Rights Signed () 14 SC-Advance Directive Ack   SC-Patient Rights Signed () 14 admission consent 1st msg medicare   Patient Documents  14 Family Phone Numbers   PGU-HIPAA Signed () 14 Pt on stretcher   PGU-Consent Signed 14    PGU-Financial Policy Signed     GVL- License Received ()  GVL- License   [employer] Card Received () 18 GVL-1st Insurance Card   GVL-2nd Insurance Card Received () 18 GVL-2nd Insurance Card   PGU-3rd Insurance Card Not Received     PGU Rel of Information Not Received     PGU-Miscellaneous Not Received     PGU-Good Help ACO Not Received     Oncology Not Received     SC-Patient Rights Received () 14 SC-Advance Directive Ack   My Chart Proxy Document Not Received     SC-Patient Rights Signed () 02/03/15 SC-Advance Directive Ack   SC-Patient Rights Received () 02/19/15 SC-Advance Directive Ack   Patient Documents   Auth for Treatment 2/19/15   SC-Patient Rights Received () 03/18/15 SC-Advance Directive Ack   ACP-Advance Directive  () 04/06/15 Advance Directive   Photo Authorization      SC-Patient Rights Received () 04/24/15 SC-Advance Directive Ack   SC-Patient Rights Received () 05/19/15 SC-Advance Directive Ack   UOA-HIPAA Not Received     UOA-Consent Not Received     UOA-Financial Consent Not Received     UOA- License Not Received     [employer] Card Not Received     [employer] Card Not Received     UOA-3rd Insurance Card Not Received     UOA-Rel of Information Not Received     UOA-Good Help ACO Not Received     UOA-Miscellaneous Not Received     GVL-HIPAA Received () 09/10/15    GVL-Consent Received 09/10/15    GVL-Financial Policy Signed () 12/30/15 SAD-Financial Policy   GVL- License Not Received     [employer] Card Not Received     [employer] Card Not Received     GVL-3rd Insurance Card Not Received     GVL-Good Help ACO Not Received     GVL-Miscellaneous Signed () 09/10/15 FP- UOA   SC-Patient Rights Signed () 09/09/15 SC-Advance Directive Ack   SC-Patient Rights Received () 09/10/15 SC-Advance Directive Ack   Patient Administration Demographic Form   Patient Administration   Misc Adm Doc/TRIM HEALTH HISTORY FORM   SC-Patient Rights Signed () 11/19/15 SC-Advance Directive Ack   SC-Patient Rights Signed () 12/02/15 SC-Advance Directive Ack   GVL-HIPAA Received () 12/30/15 SAD   GVL-Consent Received 12/30/15 SAD   GVL-Miscellaneous Signed () 12/30/15 SAD-RAVI   Insurance Not Received     Photo ID Yes Not W Pt () 19 card not with pt    SC-Patient Rights Received () 03/10/16 SC-Advance Directive Ack   SC-Patient Rights Received () 16 SC-Advance Directive Ack   SC-Patient Rights Signed () 16 SC-Advance Directive Ack   SC-Patient Rights Received () 16 SC-Advance Directive Ack   SC-Patient Rights Received () 16 SC-Advance Directive Ack   GVL-Miscellaneous Received () 16 GVL-Miscellaneous-AGS RAVI   Patient Administration Accepted () 16 falls form   Patient Administration Accepted () 16 falls form   Patient Administration   [employer] - written Order   SC-Patient Rights Received () 16    SC-Patient Rights      Financial Documents Not Received     Financial Not Received     GVL-HIPAA Received () 17    Email/Text Consent Refuse () 18    BCBS Accident Form Not Received     SC-Patient Rights Signed () 17    Patient Administration Accepted () 17 falls   SC-Patient Rights Received () 17    GVL-Financial Policy Received () 17 CSAE- FP   GVL-Miscellaneous Received () 17 CSAE- RAVI   Opt Out-Disclosure of Health Information Not Received     Care Everywhere - Patient Education Information Given () 19    SC-Patient Rights Received () 17    Patient Administration   [employer] supples/faxed --CSAE   GVL-General Consent Received 18    Care Everywhere - Patient Education 2 Information Given () 18    SC-Patient Rights Signed () 18    Prescription Card Not Received     SC-Patient Rights Signed () 18    Patient Administration Received () 18 falls   SC-Patient Rights Received () 18 SC-Patient Rights   GVL-HIPAA Received () 18 GVL-HIPAA   GVL-Financial Policy Received () 18 BSHO-FP   GVL-Miscellaneous Received () 18 BSHO-RAVI   SC Patient Rights Received () 19 SC Patient Rights   CE Auth Form Not Received     Care Everywhere - Patient Education 3 Information Given () 18    SC Patient Rights Received () 18    GVL-Patient Administration Signed () 19 BSHO-RAVI   Insurance Card Yes Not W Pt () 19 card not with pt   Chemotherapy or Biotherapy Informed Consent Not Received     SC Patient Rights Signed () 19    Patient Administration Received () 19 fall   Patient Secure Not Received     Care Everywhere - Patient Education 2 Information Given () 19    Infusion Not Received     SC Patient Rights Received () 19    GVL-HIPAA Received () 20 verbal   GVL-Financial Policy Not Received     SC Patient Rights Received () 19 SC Patient Rights   GVL-HIPAA Received () 19    GVL-Patient Administration Received () 19 CSAE- RAVI   SC-Patient Rights Received () 19    Insurance Card Verified () 21 Primary-SC MEDICARE   Photo ID Verified ()  's [id number]   Prescription Card Received 19 RX CARD   Insurance Card Verified () 21 Secondary-AARP   Care Everywhere - Patient Education Not Received ()     Care Everywhere - Patient Education 2 Not Received ()     NPP Acknowledgement Signed 20    Medication Contract Not Received     Telehealth Consents Received 20 verbal   Email/Text Consent Refuse 20    GVL-Patient Administration Received () 20 PGU DEMO   GVL-Patient Administration Received () 20 PGU RAVI   GVL-Patient Administration Received () 20 PGU ROS   SC Patient Rights Signed 20    Care Everywhere - Patient Education 3 Information Given 21    Care Everywhere - Patient Education 3 Information Given 21    Insurance Card Verified 21 Medicare AARP - 2nd insurance   Care Everywhere - Patient Education Not Received     Care Everywhere - Patient Education 2 Received () 21    SC Patient Rights Signed 21    Telehealth Consents Not Received     SC Patient Rights Signed 21    SC Patient Rights Signed 21    Financial Application Not Received     Financial Documentation Not Received     Care Everywhere - Patient Education 2 Received 21    Insurance Received 05/15/21 Medicare verification   SC Patient Rights Received 21 SC Patient Rights   SC Patient Rights Signed 21    SC Patient Rights Signed 21    SC Patient Rights Signed 21    SC Patient Rights Signed 06/10/21    SC Patient Rights Received 21 SC Patient Rights   SC Patient Rights Signed 21    SC Patient Rights Signed 21    SC Patient Rights Signed 21    GVL-Patient Administration Received 21 TRIM RAVI   SC Patient Rights Signed 21    SC Patient Rights Signed 21    SC Patient Rights  21    SC Patient Rights      SC Patient Rights Received 21    GVL-HIPAA Received 09/10/21    Photo ID Not Received  not with patient   GVL-Patient Administration Received 21 csae ravi   SC Patient Rights Signed 21    SC Patient Rights Signed 09/15/21 verbal per pt   SC Patient Rights Signed 21    Documents for the Encounter   Laboratory Result Not Received     Infant/Mother Not Received     Medicare Cm of Rep Not Received     SC-1st Msg from Medicare Not Received SC-Participates in Care Received 09/21/21    SC-2nd Msg from Medicare Not Received     Order Not Received     Consents Not Received     Commitment/Law Enforce Not Received     Cardio/Pulmonary Rehab Not Received     OB Ultrasound Not Received     ACOG Not Received     Office Notes for Precertification Not Received     External Not Received     OP Out Publicity Not Received     Emp Payroll Deduction Not Received     Consent and Release Not Received     State Observation Notice Not Received     Contractual Document Not Received     Administrative Not Received     Death Report Not Received     ABN Not Received     Vaccination Not Received     Medicare Outpatient Observation Notice Not Received     2nd  Important Message Not Received     Case Management Not Received     Code 44 Not Received     Notice of NON Coverage Not Received     Psychotropic Medication Consent Not Received     Medicare Lifetime Reserve      BPCI-A Letter Not Received     After Visit Summary   Medication List   After Visit Summary   ED After Visit Summary   External Received 09/22/21 Ambulance Run Report / EMS Report   Emergency Contact(s)    Name Relation Home Work Mobile   [place] Friend [phone]     Delmus Aase Other Relative [phone]   Emergency Contact Information    Name: Tyra Relationship:    Address:     City:  State:  Zip:  Phone: [phone]    Business phone:    Links to Patient Summary Reports    Kardex Order Hx 24-Hr I/O [place] Report (Timeline)  Active LDAs   Admission Violent Restraints Discharge Report Glucose Management (Timeline)  Medication Admin Rpt   Med Rec History Nursing Intervention Recent Results ED Encounter Summary OR Encounter Summary   Encounter Summary    No contacts found

## 2021-09-23 ENCOUNTER — HOME CARE VISIT (OUTPATIENT)
Dept: SCHEDULING | Facility: HOME HEALTH | Age: 76
End: 2021-09-23
Payer: MEDICARE

## 2021-09-23 ENCOUNTER — HOSPITAL ENCOUNTER (OUTPATIENT)
Dept: LAB | Age: 76
Discharge: HOME | DRG: 699 | End: 2021-09-23
Payer: MEDICARE

## 2021-09-23 VITALS
SYSTOLIC BLOOD PRESSURE: 120 MMHG | DIASTOLIC BLOOD PRESSURE: 60 MMHG | RESPIRATION RATE: 16 BRPM | HEART RATE: 88 BPM | OXYGEN SATURATION: 98 % | TEMPERATURE: 97.6 F

## 2021-09-23 LAB
ANION GAP SERPL CALC-SCNC: 5 MMOL/L (ref 7–16)
BUN SERPL-MCNC: 30 MG/DL (ref 8–23)
CALCIUM SERPL-MCNC: 8.6 MG/DL (ref 8.3–10.4)
CHLORIDE SERPL-SCNC: 105 MMOL/L (ref 98–107)
CO2 SERPL-SCNC: 28 MMOL/L (ref 21–32)
CREAT SERPL-MCNC: 1.47 MG/DL (ref 0.8–1.5)
ERYTHROCYTE [DISTWIDTH] IN BLOOD BY AUTOMATED COUNT: 14.6 % (ref 11.9–14.6)
GLUCOSE SERPL-MCNC: 94 MG/DL (ref 65–100)
HCT VFR BLD AUTO: 27.4 % (ref 41.1–50.3)
HGB BLD-MCNC: 8.2 G/DL (ref 13.6–17.2)
MCH RBC QN AUTO: 27.4 PG (ref 26.1–32.9)
MCHC RBC AUTO-ENTMCNC: 29.9 G/DL (ref 31.4–35)
MCV RBC AUTO: 91.6 FL (ref 79.6–97.8)
NRBC # BLD: 0 K/UL (ref 0–0.2)
PLATELET # BLD AUTO: 336 K/UL (ref 150–450)
PMV BLD AUTO: 9.9 FL (ref 9.4–12.3)
POTASSIUM SERPL-SCNC: 4.6 MMOL/L (ref 3.5–5.1)
RBC # BLD AUTO: 2.99 M/UL (ref 4.23–5.6)
SODIUM SERPL-SCNC: 138 MMOL/L (ref 138–145)
WBC # BLD AUTO: 7.1 K/UL (ref 4.3–11.1)

## 2021-09-23 PROCEDURE — 3331090002 HH PPS REVENUE DEBIT

## 2021-09-23 PROCEDURE — 3331090001 HH PPS REVENUE CREDIT

## 2021-09-23 PROCEDURE — 80048 BASIC METABOLIC PNL TOTAL CA: CPT

## 2021-09-23 PROCEDURE — 85027 COMPLETE CBC AUTOMATED: CPT

## 2021-09-23 PROCEDURE — G0299 HHS/HOSPICE OF RN EA 15 MIN: HCPCS

## 2021-09-24 ENCOUNTER — HOME CARE VISIT (OUTPATIENT)
Dept: HOME HEALTH SERVICES | Facility: HOME HEALTH | Age: 76
End: 2021-09-24
Payer: MEDICARE

## 2021-09-24 ENCOUNTER — HOME CARE VISIT (OUTPATIENT)
Dept: SCHEDULING | Facility: HOME HEALTH | Age: 76
End: 2021-09-24
Payer: MEDICARE

## 2021-09-24 VITALS
HEART RATE: 81 BPM | DIASTOLIC BLOOD PRESSURE: 78 MMHG | SYSTOLIC BLOOD PRESSURE: 140 MMHG | OXYGEN SATURATION: 100 % | RESPIRATION RATE: 16 BRPM | TEMPERATURE: 99.5 F

## 2021-09-24 VITALS
OXYGEN SATURATION: 100 % | RESPIRATION RATE: 15 BRPM | TEMPERATURE: 97.7 F | HEART RATE: 82 BPM | SYSTOLIC BLOOD PRESSURE: 138 MMHG | DIASTOLIC BLOOD PRESSURE: 78 MMHG

## 2021-09-24 PROCEDURE — 3331090002 HH PPS REVENUE DEBIT

## 2021-09-24 PROCEDURE — 3331090001 HH PPS REVENUE CREDIT

## 2021-09-24 PROCEDURE — G0299 HHS/HOSPICE OF RN EA 15 MIN: HCPCS

## 2021-09-25 ENCOUNTER — HOME CARE VISIT (OUTPATIENT)
Dept: SCHEDULING | Facility: HOME HEALTH | Age: 76
End: 2021-09-25
Payer: MEDICARE

## 2021-09-25 VITALS
OXYGEN SATURATION: 100 % | RESPIRATION RATE: 16 BRPM | SYSTOLIC BLOOD PRESSURE: 138 MMHG | HEART RATE: 96 BPM | DIASTOLIC BLOOD PRESSURE: 78 MMHG | TEMPERATURE: 97.5 F

## 2021-09-25 PROCEDURE — G0299 HHS/HOSPICE OF RN EA 15 MIN: HCPCS

## 2021-09-25 PROCEDURE — 3331090002 HH PPS REVENUE DEBIT

## 2021-09-25 PROCEDURE — 3331090001 HH PPS REVENUE CREDIT

## 2021-09-26 ENCOUNTER — HOME CARE VISIT (OUTPATIENT)
Dept: HOME HEALTH SERVICES | Facility: HOME HEALTH | Age: 76
End: 2021-09-26
Payer: MEDICARE

## 2021-09-26 ENCOUNTER — APPOINTMENT (OUTPATIENT)
Dept: CT IMAGING | Age: 76
DRG: 699 | End: 2021-09-26
Attending: SURGERY
Payer: MEDICARE

## 2021-09-26 ENCOUNTER — HOME CARE VISIT (OUTPATIENT)
Dept: SCHEDULING | Facility: HOME HEALTH | Age: 76
End: 2021-09-26
Payer: MEDICARE

## 2021-09-26 ENCOUNTER — HOSPITAL ENCOUNTER (INPATIENT)
Age: 76
LOS: 2 days | Discharge: NURSING, REHAB OR LONG-TERM CARE | DRG: 699 | End: 2021-09-28
Attending: EMERGENCY MEDICINE | Admitting: SURGERY
Payer: MEDICARE

## 2021-09-26 DIAGNOSIS — F51.01 PRIMARY INSOMNIA: ICD-10-CM

## 2021-09-26 DIAGNOSIS — S37.20XA: Primary | ICD-10-CM

## 2021-09-26 LAB
ALBUMIN SERPL-MCNC: 2.4 G/DL (ref 3.2–4.6)
ALBUMIN/GLOB SERPL: 0.5 {RATIO} (ref 1.2–3.5)
ALP SERPL-CCNC: 85 U/L (ref 50–136)
ALT SERPL-CCNC: 26 U/L (ref 12–65)
ANION GAP SERPL CALC-SCNC: 4 MMOL/L (ref 7–16)
AST SERPL-CCNC: 24 U/L (ref 15–37)
BILIRUB SERPL-MCNC: 0.2 MG/DL (ref 0.2–1.1)
BUN SERPL-MCNC: 25 MG/DL (ref 8–23)
CALCIUM SERPL-MCNC: 8.9 MG/DL (ref 8.3–10.4)
CHLORIDE SERPL-SCNC: 106 MMOL/L (ref 98–107)
CO2 SERPL-SCNC: 30 MMOL/L (ref 21–32)
CREAT SERPL-MCNC: 1.29 MG/DL (ref 0.8–1.5)
ERYTHROCYTE [DISTWIDTH] IN BLOOD BY AUTOMATED COUNT: 14.6 % (ref 11.9–14.6)
GLOBULIN SER CALC-MCNC: 4.5 G/DL (ref 2.3–3.5)
GLUCOSE SERPL-MCNC: 88 MG/DL (ref 65–100)
HCT VFR BLD AUTO: 27.8 % (ref 41.1–50.3)
HGB BLD-MCNC: 8.4 G/DL (ref 13.6–17.2)
MCH RBC QN AUTO: 27.8 PG (ref 26.1–32.9)
MCHC RBC AUTO-ENTMCNC: 30.2 G/DL (ref 31.4–35)
MCV RBC AUTO: 92.1 FL (ref 79.6–97.8)
NRBC # BLD: 0 K/UL (ref 0–0.2)
PLATELET # BLD AUTO: 496 K/UL (ref 150–450)
PMV BLD AUTO: 8.4 FL (ref 9.4–12.3)
POTASSIUM SERPL-SCNC: 4.1 MMOL/L (ref 3.5–5.1)
PROT SERPL-MCNC: 6.9 G/DL (ref 6.3–8.2)
RBC # BLD AUTO: 3.02 M/UL (ref 4.23–5.6)
SODIUM SERPL-SCNC: 140 MMOL/L (ref 136–145)
WBC # BLD AUTO: 6.6 K/UL (ref 4.3–11.1)

## 2021-09-26 PROCEDURE — 74011250636 HC RX REV CODE- 250/636: Performed by: SURGERY

## 2021-09-26 PROCEDURE — 74011000636 HC RX REV CODE- 636: Performed by: SURGERY

## 2021-09-26 PROCEDURE — G0299 HHS/HOSPICE OF RN EA 15 MIN: HCPCS

## 2021-09-26 PROCEDURE — 85027 COMPLETE CBC AUTOMATED: CPT

## 2021-09-26 PROCEDURE — 74177 CT ABD & PELVIS W/CONTRAST: CPT

## 2021-09-26 PROCEDURE — 36415 COLL VENOUS BLD VENIPUNCTURE: CPT

## 2021-09-26 PROCEDURE — 99284 EMERGENCY DEPT VISIT MOD MDM: CPT

## 2021-09-26 PROCEDURE — 3331090001 HH PPS REVENUE CREDIT

## 2021-09-26 PROCEDURE — 3331090002 HH PPS REVENUE DEBIT

## 2021-09-26 PROCEDURE — 74011000258 HC RX REV CODE- 258: Performed by: SURGERY

## 2021-09-26 PROCEDURE — 80053 COMPREHEN METABOLIC PANEL: CPT

## 2021-09-26 PROCEDURE — 65270000029 HC RM PRIVATE

## 2021-09-26 RX ORDER — SODIUM CHLORIDE 0.9 % (FLUSH) 0.9 %
5-40 SYRINGE (ML) INJECTION AS NEEDED
Status: DISCONTINUED | OUTPATIENT
Start: 2021-09-26 | End: 2021-09-28 | Stop reason: HOSPADM

## 2021-09-26 RX ORDER — ACETAMINOPHEN 650 MG/1
650 SUPPOSITORY RECTAL
Status: DISCONTINUED | OUTPATIENT
Start: 2021-09-26 | End: 2021-09-28 | Stop reason: HOSPADM

## 2021-09-26 RX ORDER — SODIUM CHLORIDE 0.9 % (FLUSH) 0.9 %
10 SYRINGE (ML) INJECTION
Status: COMPLETED | OUTPATIENT
Start: 2021-09-26 | End: 2021-09-26

## 2021-09-26 RX ORDER — ONDANSETRON 4 MG/1
4 TABLET, ORALLY DISINTEGRATING ORAL
Status: DISCONTINUED | OUTPATIENT
Start: 2021-09-26 | End: 2021-09-28 | Stop reason: HOSPADM

## 2021-09-26 RX ORDER — ONDANSETRON 2 MG/ML
4 INJECTION INTRAMUSCULAR; INTRAVENOUS
Status: DISCONTINUED | OUTPATIENT
Start: 2021-09-26 | End: 2021-09-28 | Stop reason: HOSPADM

## 2021-09-26 RX ORDER — POLYETHYLENE GLYCOL 3350 17 G/17G
17 POWDER, FOR SOLUTION ORAL DAILY PRN
Status: DISCONTINUED | OUTPATIENT
Start: 2021-09-26 | End: 2021-09-28 | Stop reason: HOSPADM

## 2021-09-26 RX ORDER — ACETAMINOPHEN 325 MG/1
650 TABLET ORAL
Status: DISCONTINUED | OUTPATIENT
Start: 2021-09-26 | End: 2021-09-28 | Stop reason: HOSPADM

## 2021-09-26 RX ORDER — CIPROFLOXACIN 2 MG/ML
400 INJECTION, SOLUTION INTRAVENOUS EVERY 12 HOURS
Status: DISCONTINUED | OUTPATIENT
Start: 2021-09-26 | End: 2021-09-28 | Stop reason: HOSPADM

## 2021-09-26 RX ORDER — ENOXAPARIN SODIUM 100 MG/ML
40 INJECTION SUBCUTANEOUS EVERY 24 HOURS
Status: DISCONTINUED | OUTPATIENT
Start: 2021-09-26 | End: 2021-09-28 | Stop reason: HOSPADM

## 2021-09-26 RX ORDER — SODIUM CHLORIDE 0.9 % (FLUSH) 0.9 %
5-40 SYRINGE (ML) INJECTION EVERY 8 HOURS
Status: DISCONTINUED | OUTPATIENT
Start: 2021-09-26 | End: 2021-09-28 | Stop reason: HOSPADM

## 2021-09-26 RX ADMIN — CIPROFLOXACIN 400 MG: 2 INJECTION, SOLUTION INTRAVENOUS at 22:04

## 2021-09-26 RX ADMIN — CIPROFLOXACIN 400 MG: 2 INJECTION, SOLUTION INTRAVENOUS at 12:36

## 2021-09-26 RX ADMIN — Medication 10 ML: at 20:41

## 2021-09-26 RX ADMIN — Medication 10 ML: at 15:19

## 2021-09-26 RX ADMIN — DIATRIZOATE MEGLUMINE AND DIATRIZOATE SODIUM 15 ML: 660; 100 LIQUID ORAL; RECTAL at 19:32

## 2021-09-26 RX ADMIN — IOPAMIDOL 100 ML: 755 INJECTION, SOLUTION INTRAVENOUS at 20:41

## 2021-09-26 RX ADMIN — Medication 5 ML: at 22:04

## 2021-09-26 RX ADMIN — SODIUM CHLORIDE 100 ML: 900 INJECTION, SOLUTION INTRAVENOUS at 20:41

## 2021-09-26 RX ADMIN — ENOXAPARIN SODIUM 40 MG: 40 INJECTION SUBCUTANEOUS at 12:36

## 2021-09-26 NOTE — ED PROVIDER NOTES
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 He has had normal drainage from his bilateral nephrostomy tubes, but decreased drainage from his Bishop. Denies fever, nausea, vomiting, or pain. Still has an open rectal wound from completion proctectomy, repair of cystotomy, and right gracilis flap perineal reconstruction in August.      Dc summary:  \"Admit Date:     9/15/2021  7:03 AM   DC Note date: 2021  Name:              Soco Menjivar   Age:                 66 y.o. Sex:                 male  :               1945   MRN:               [id number]   Room:                PCP:                Escobar Rodriguez DO     Presenting Complaint: Fatigue     Initial Admission Diagnosis: REBEKAH (acute kidney injury) ([facility] (N17.9)  Hyperkalemia (E87.5)  Bilateral hydronephrosis (N13.30)  Hematuria (R31.9)      Problem List for this Hospitalization:  Hospital Problems as of 2021 Date Reviewed: 9/10/2021        Codes Class Noted - Resolved POA    Hyperkalemia ICD-10-CM: E87.5  ICD-9-CM: 276.7   9/15/2021 - Present Unknown         Hematuria ICD-10-CM: R31.9  ICD-9-CM: 599.70   9/15/2021 - Present Unknown         * (Principal) REBEKAH (acute kidney injury) ([facility] ICD-10-CM: N17.9  ICD-9-CM: 351. 9   9/15/2021 - Present Unknown         Urinary retention ICD-10-CM: R33.9  ICD-9-CM: 788.20   9/15/2021 - Present Unknown         Bilateral hydronephrosis ICD-10-CM: N13.30  ICD-9-CM: 385   2021 - Present Unknown         Hypothyroid ICD-10-CM: E03.9  ICD-9-CM: 771. 9   2019 - Present Yes    Overview Signed 2019 12:12 AM by Zorita Apple, MD        stable w/med                  Did Patient have Sepsis (YES OR NO): No     [facility] Course:  Mr. Marroquin is a 66 y. o. male with medical history of pelvic abscess, nephrolithiasis, urinary retention, complex surgical history with recent completion proctectomy of his defunctionalized rectum and repair of cystotomy on 8/27/2021 who presented with generalized weakness, fatigue.  He had a follow-up on 9/10 with cystogram showing no bladder injury.  Patient reports that he has not been able to urinate much but has urinary incontinence.  Renal ultrasound was done in the ER and showed bilateral hydronephrosis with bilateral renal stones.  Bishop catheter was placed and showed return of 1 L of urine blood-tinged.  Urology and Nephrology consulted. Rosalia Quevedo demonstrated a posterior bladder wall defect.  Urine leak does look to only be in pelvic region and not in peritoneal space.  IR placed bilat perc neph tubes on Sep/16 for maximal diversion away from the bladder due to wall defect. At time of discharge bilat NT drainage is clear, yellow. Renal function continues to improve. WBC normal at time of discharge. Will discharge on 2 week course of cipro for complicated Pseudomonas UTI. He is to follow up with his PCP within 5 days and see Dr. Meg Owens, Urologist in 2-3 weeks for cystoscopy. He is to follow up with Dr. Xavi Valentine, Int Radiologist in 6 weeks. BMP and CBC ordered for next week and can be completed at PCP's office. Home health recommended by PT and patient stated he is agreeable to [facility]; I also encouraged him to utilize this very valuable service. Mr. Derrick Morin verbally return demonstrates that he understands the plan. He is appropriate for discharge home with home health on Sep/19/2021.     Assessment and Plan:  Principal Problem:  REBEKAH (acute kidney injury) ([facility] (9/15/2021)  Creatinine 6 on admission.  Renal ultrasound shows bilateral hydronephrosis.  Bishop catheter placed in the ER. IVF. Urology consulted.  Appreciate recommendations.   CT abd/pel read as bilat hydronephrosis R > L.  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 Nephrology consulted.  Appreciate recommendations. Sep/16: sCr 3.85; bilat perc neph tubes placed  Sep/17: sCr 2.75  Sep/18: sCr 1.97  Sep/19: sCr 1.44     Active Problems:  Posterior bladder wall defect  Pseudomonas aeruginosa UTI  Imaging reviewed; urine leak ppears confined to pelvic region  Monitor temp, WBC  Bilat perc neph tubes placed Sep/16 by IR; f/u in 6 weeks.   May need open ex lap or cystoscopy in the near future if defect does not heal  Sep/17: Urine cx w/ GNR; awaiting final              - Blood cxs NG x 2 days  Sep/18: Urine cx results returned              - Stop ceftriaxone; start cefepime (renal adjusted)              - Other GNR organism in process              - Blood cxs NG x 3 days  Sep/19: Discharge with cipro 750 mg q12hr x 14 days              - Blood cxs NG x 4 days              - WBC wnl, afebrile, no chills/sweats              - F/u PCP in 5 days     Hematuria  Urology on board  Monitor CBC  Bilat perc neph tubes placed Sep/16 by IR; clear, yellow at time of discharge     Bilateral hydronephrosis (5/11/2021)  Urology on board   Cont Bishop and NTs to drainage     Anemia  CBC next week  Start FeSO4 daily     Essential hypertension  Sep/18: Consider adding non-selective BB; follow trends.              - Cont PRN hydralazine  Start carvedilol     Hypokalemia  Sep/19: Replace  Start 10 mEq daily x 7 days; f/u BMP outpatient     Hyperkalemia, resolved     Moderate protein calorie malnutrition  Appreciate RD recommendations     Hypothyroidism  Levothyroxine 125 mcg     Anxiety  Continue home Elavil, clonazepam, olanzapine.     Disposition: Home Health Care [facility]  Diet: ADULT DIET Regular; Low Potassium (Less than 3000 mg/day); 60 to 80 gm  ADULT ORAL NUTRITION SUPPLEMENT Lunch, Dinner; Renal Supplement  Code Status: Full Code\"      Other  Pertinent negatives include no chest pain, no abdominal pain, no headaches and no shortness of breath.         Past Medical History:   Diagnosis Date    Acute deep vein thrombosis (DVT) of non-extremity vein 5/20/2019    Family history of malignant neoplasm of gastrointestinal tract     mother colon/ovarian cancer 67    Fecal incontinence     LAR syndrome    Former cigarette smoker     History of rectal cancer     Hypothyroid     stable w/med    Infection and inflammatory reaction due to other internal prosthetic devices, implants and grafts, subsequent encounter 2017    abd wound/mesh colostomy    Insomnia     takes meds    Major depression     Osteoarthritis, hand     Personal history of colonic polyps 9/2013    x 1    Personal history of malignant neoplasm of rectum, rectosigmoid junction, and anus 9/2013    Psychiatric disorder     anxiety       Past Surgical History:   Procedure Laterality Date    COLONOSCOPY N/A 2/12/2018    COLONOSCOPY / BMI=21 performed by Harriett Ngo MD at [address] [facility] N/A 3/5/2021    COLONOSCOPY/BMI 19 performed by Grisel Schmidt MD at [address] [place] [address]  2/12/2018         COLONOSCOPY THRU STOMA,LESMADY RMVL W/SNARE  3/5/2021         ENDOSCOPY, COLON, DIAGNOSTIC  last 2/3/15    Emily--no polyps--3 year recall    HX COLOSTOMY  5/11/16    HX GI  4/2016    Sacral nerve stimlator lead trial (unsucessful) and removal    HX HERNIA REPAIR  3/2015, 5/2016    HX HERNIA REPAIR      and Colostomy in May    HX OTHER SURGICAL  10/7/2013    Emily---endorectal us    HX OTHER SURGICAL Bilateral     cataracts  2017    HX POLYPECTOMY      HX TOTAL COLECTOMY  11/2013    Emily--lap LAR with coloproctostomy, mobilization splenic flexure, diverting loop ileostomy    HX TOTAL COLECTOMY Right 8/2014    for leak    HX VASCULAR ACCESS Left 4/14    single lumen port/subsequently removed    IR CHANGE ABSCESS / CYST CATHETER  2021    IR CHANGE ABSCESS / CYST CATHETER  2021    IR CHANGE ABSCESS / CYST CATHETER  2021    IR CHANGE ABSCESS / CYST CATHETER  2021    IR INJ ABS CYST VIA CATHETER / TUBE  2021    IR INJ ABS CYST VIA CATHETER / TUBE  2021    IR INSERT TUNL CVC W/O PORT OVER 5 YR  2019    IR NEPHROSTOMY PERC LT PLC CATH  SI  2021    IR REPLACE CVC TUNNELED W/O PORT  2019         Family History:   Problem Relation Age of Onset    Cancer Mother         colon and ovarian    Cancer Brother         prostate    Alcohol abuse Brother     Cancer Maternal Grandmother         bone    Alcohol abuse Father     Alcohol abuse Sister     Stroke Paternal Grandfather        Social History     Socioeconomic History    Marital status: SINGLE     Spouse name: Not on file    Number of children: Not on file    Years of education: Not on file    Highest education level: Not on file   Occupational History    Not on file   Tobacco Use    Smoking status: Former Smoker     Packs/day: 1.00     Years: 4.00     Pack years: 4.00     Types: Cigarettes     Quit date: 1963     Years since quittin.9    Smokeless tobacco: Never Used   Substance and Sexual Activity    Alcohol use: Not Currently     Alcohol/week: 11.7 standard drinks     Types: 14 Cans of beer per week     Comment: advised stop drinking given issues    Drug use: No    Sexual activity: Never   Other Topics Concern    Not on file   Social History Narrative    Not on file     Social Determinants of Health     Financial Resource Strain:     Difficulty of Paying Living Expenses:    Food Insecurity:     Worried About Running Out of Food in the Last Year:     Ran Out of Food in the Last Year:    Transportation Needs:     Lack of Transportation (Medical):      Lack of Transportation (Non-Medical):    Physical Activity:     Days of Exercise per Week:     Minutes of Exercise per Session:    Stress:     Feeling of Stress :    Social Connections:     Frequency of Communication with Friends and Family:     Frequency of Social Gatherings with Friends and Family:     Attends Christian Services:     Active Member of Clubs or Organizations:     Attends Club or Organization Meetings:     Marital Status:    Intimate Partner Violence:     Fear of Current or Ex-Partner:     Emotionally Abused:     Physically Abused:     Sexually Abused: ALLERGIES: Patient has no known allergies. Review of Systems   Constitutional: Negative for fever. HENT: Negative for hearing loss. Eyes: Negative for visual disturbance. Respiratory: Negative for cough and shortness of breath. Cardiovascular: Negative for chest pain. Gastrointestinal: Negative for abdominal pain, diarrhea, nausea and vomiting. Genitourinary: Positive for difficulty urinating. Musculoskeletal: Negative for back pain. Skin: Positive for wound. Negative for rash. Neurological: Negative for headaches. Psychiatric/Behavioral: Negative for confusion. All other systems reviewed and are negative. Vitals:    09/26/21 1005   BP: (!) 166/81   Pulse: 71   Resp: 16   Temp: 97.7 °F (36.5 °C)   SpO2: 96%            Physical Exam  Vitals and nursing note reviewed. Constitutional:       Appearance: Normal appearance. He is well-developed. HENT:      Head: Normocephalic and atraumatic. Nose: Nose normal.      Mouth/Throat:      Mouth: Mucous membranes are moist.   Eyes:      Pupils: Pupils are equal, round, and reactive to light. Cardiovascular:      Rate and Rhythm: Regular rhythm. Heart sounds: Normal heart sounds. Pulmonary:      Effort: Pulmonary effort is normal.      Breath sounds: Normal breath sounds. Abdominal:      Palpations: Abdomen is soft. Tenderness: There is no abdominal tenderness. Genitourinary:          Musculoskeletal:         General: No deformity.  Normal range of motion. Cervical back: Normal range of motion and neck supple. Back:    Skin:     General: Skin is warm and dry. Neurological:      General: No focal deficit present. Mental Status: He is alert. Mental status is at baseline. Psychiatric:         Mood and Affect: Mood normal.         Behavior: Behavior normal.          MDM  Number of Diagnoses or Management Options  Diagnosis management comments: Parts of this document were created using dragon voice recognition software. The chart has been reviewed but errors may still be present. I wore appropriate PPE throughout this patient's ED visit. Ting Jackson MD, 10:13 AM    10:38 AM  Discussed with surgery, Dr Katia Solorzano to confirm status of wounds/fistulas/bladder defect. Advised urology evaluation. lanie Segura. Manuel garcia.    11:49 AM  Dr Heavenly Campbell evaluated pt and placed orders for admission. CT CYSTOGRAM 9/15/21     INDICATION: Bladder wall defect     COMPARISON: Noncontrast CT from earlier today, also on 7/23/2021     TECHNIQUE: Multiple axial images were obtained through the pelvis following the  administration of 250 mL of Cysto-Conray to the patient's indwelling Bishop  catheter. Radiation dose reduction techniques were used for this study. Our CT  scanners use one or all of the following: Automated exposure control, adjustment  of the mA and/or kV according to patient size, iterative reconstruction.     FINDINGS:     Kidneys: Right hydronephrosis partially imaged. The left kidney is not seen  within the field-of-view.     Bladder: Large defect within the posterior wall bladder measuring approximately  2.9 cm. There is extravasation of contrast from the bladder through this defect  associated with either tumor or abscess within the presacral space.  Contrast  fills this space and extends inferiorly to the left of the anal canal,  approaching a perianal fistulous tract.     The urinary bladder contains a Bishop catheter and demonstrates significant  debris and air.     Pelvic organs: Unremarkable appearance of the prostate and seminal vesicles.     Gastrointestinal: Left lower quadrant colostomy.     Peritoneum/retroperitoneum: Normal.     Lymph nodes: Normal.     Vessels: Aortoiliac atherosclerotic disease.     Bones/Soft tissues: No aggressive osseous lesion.     IMPRESSION  Contrast injected through the Bishop catheter into the bladder extravasates  through a large defect within the posterior bladder wall, fills the presacral  space and approaches an apparent perianal fistulous tract.          Procedures

## 2021-09-26 NOTE — PROGRESS NOTES
TRANSFER - OUT REPORT:    Verbal report given to 7th floor RN on Karmen Victor  being transferred to [address] [place] [address] for routine progression of care       Report consisted of patients Situation, Background, Assessment and   Recommendations(SBAR). Information from the following report(s) SBAR, ED Summary, [facility] and Recent Results was reviewed with the receiving nurse. Lines:   Peripheral IV 09/26/21 Right Antecubital (Active)        Opportunity for questions and clarification was provided.       Patient transported with:   [email or web address]

## 2021-09-26 NOTE — H&P
Luther Marroquin    9/26/2021    Date of Admission:  9/26/2021      Subjective: This is a 78-year-old male with complex medical history of rectal cancer and colon resection in the past with recent completion proctectomy with perineal approach and repair of cystotomy with plastic surgery closure of the perineum who presents complaining of urine leakage from his perineal incision. He has bilateral nephrostomy tubes and Bishop catheter which was replaced yesterday. He states that he began draining clear urine consistency leakage from his perineal wound. He states that every time he stands up the wound leaks what seems to be urine. There was a known bladder injury during his most recent surgery on 8/27/2021 with bladder repair. He denies any pain. He denies any associated fevers nausea or vomiting. He presented to the emergency department for evaluation of bladder leak through the perineum and surgery is consulted for admission. Lab work and CT abdomen pelvis are pending. Dr. Ene Bethea of urology is aware.     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    Pelvic abscess in [facility]) 05/13/2021    Anxiety 05/11/2021    UTI (urinary tract infection) 05/11/2021    Bilateral hydronephrosis 05/11/2021    Dilated intrahepatic bile duct 05/11/2021    Pancreatic duct dilated 05/11/2021    Major depression     Hypothyroid 06/14/2019    Fever 06/13/2019    Acute UTI (urinary tract infection) 06/13/2019    Anemia 06/13/2019    Elevated LFTs 06/13/2019    Acute blood loss anemia 05/21/2019    Acute deep vein thrombosis (DVT) of non-extremity vein 05/20/2019    DVT (deep vein thrombosis) in pregnancy 05/20/2019    Severe protein-calorie malnutrition ([address] 05/19/2019    On total parenteral nutrition (TPN) 05/19/2019    Colostomy care ([address] 05/19/2019    Chronic indwelling Bishop catheter 05/19/2019    Moderate protein-calorie malnutrition ([address] 05/19/2019    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    Small bowel obstruction (HCC) 03/30/2019    SBO (small bowel obstruction) (HCC) 03/22/2019    Infection of implant 03/30/2017    Abdominal wall abscess 05/22/2016    Infected prosthetic mesh of abdominal wall (HCC) 05/22/2016    Abscess 05/22/2016    Fecal incontinence 05/11/2016    Bowel incontinence 05/11/2016    Weight loss 11/19/2015    Hypercalcemia of malignancy 11/19/2015    Hypokalemia 08/29/2014    Hypomagnesemia 08/29/2014    Pleural effusion 08/29/2014    Hypernatremia 08/29/2014    Severe sepsis with acute organ dysfunction (HCC) 08/24/2014    Hypoxemia 08/24/2014    Peritonitis (acute) generalized 08/24/2014    Attention to ileostomy ([address] 08/20/2014    Dehydration 04/17/2014    Ileus ([address] 03/20/2014    Postoperative ileus ([address] 02/07/2014    Constipation 10/29/2013    Rectal cancer ([address] 10/22/2013     Past Medical History:   Diagnosis Date    Acute deep vein thrombosis (DVT) of non-extremity vein 5/20/2019    Family history of malignant neoplasm of gastrointestinal tract     mother colon/ovarian cancer 67    Fecal incontinence     LAR syndrome    Former cigarette smoker     History of rectal cancer     Hypothyroid     stable w/med    Infection and inflammatory reaction due to other internal prosthetic devices, implants and grafts, subsequent encounter 2017    abd wound/mesh colostomy    Insomnia     takes meds    Major depression     Osteoarthritis, hand     Personal history of colonic polyps 9/2013    x 1    Personal history of malignant neoplasm of rectum, rectosigmoid junction, and anus 9/2013    Psychiatric disorder     anxiety      Past Surgical History:   Procedure Laterality Date    COLONOSCOPY N/A 2/12/2018    COLONOSCOPY / BMI=21 performed by Neelam Rosario MD at [facility] N/A 3/5/2021    COLONOSCOPY/BMI 19 performed by Brina Novoa MD at [address]  2/12/2018         COLONOSCOPY THRU STOMA,LESN RMVL W/SNARE  3/5/2021         ENDOSCOPY, COLON, DIAGNOSTIC  last 2/3/15    Emily--no polyps--3 year recall    HX COLOSTOMY  5/11/16    HX GI  4/2016    Sacral nerve stimlator lead trial (unsucessful) and removal    HX HERNIA REPAIR  3/2015, 5/2016    HX HERNIA REPAIR      and Colostomy in May    HX OTHER SURGICAL  10/7/2013    Emily---endorectal us    HX OTHER SURGICAL Bilateral     cataracts  2017    HX POLYPECTOMY      HX TOTAL COLECTOMY  11/2013    Emily--lap LAR with coloproctostomy, mobilization splenic flexure, diverting loop ileostomy    HX TOTAL COLECTOMY Right 8/2014    for leak    HX VASCULAR ACCESS Left 4/14    single lumen port/subsequently removed    IR CHANGE ABSCESS / CYST CATHETER  5/27/2021    IR CHANGE ABSCESS / CYST CATHETER  6/14/2021    IR CHANGE ABSCESS / CYST CATHETER  7/8/2021    IR CHANGE ABSCESS / CYST CATHETER  8/5/2021    IR INJ ABS CYST VIA CATHETER / TUBE  5/20/2021    IR INJ ABS CYST VIA CATHETER / TUBE  6/4/2021    IR INSERT TUNL CVC W/O PORT OVER 5 YR  5/20/2019    IR NEPHROSTOMY PERC LT PLC CATH  SI  9/16/2021    IR REPLACE CVC TUNNELED W/O PORT  5/30/2019      Prior to Admission Medications   Prescriptions Last Dose Informant Patient Reported? Taking? Cholecalciferol, Vitamin D3, (VITAMIN D3) 2,000 unit cap capsule   No No   Sig: Take 2,000 Units by mouth two (2) times a day. Lactobacillus Acidoph & Bulgar (Floranex) 1 million cell tab tablet   No No   Sig: Take 2 Tablets by mouth two (2) times a day for 15 days. OLANZapine (ZYPREXA) 2.5 mg tablet   Yes No   Sig: Take 7.5 mg by mouth nightly.  Indications: additional medications to treat depression, Takes 3 x 2.5 mg tabs acetaminophen (TYLENOL) 500 mg tablet   Yes No   Sig: Take 500 mg by mouth every six (6) hours as needed for Pain. amitriptyline (ELAVIL) 100 mg tablet   No No   Sig: Take 1 Tab by mouth nightly. Dr Niecy Diop psych  Indications: takes for sleep   ascorbic acid, vitamin C, (VITAMIN C) 500 mg tablet   Yes No   Sig: Take 500 mg by mouth daily. carvediloL (COREG) 3.125 mg tablet   No No   Sig: Take 2 Tablets by mouth two (2) times daily (with meals) for 30 days. Indications: high blood pressure   ciprofloxacin HCl (CIPRO) 750 mg tablet   No No   Sig: Take 1 Tablet by mouth two (2) times a day for 14 days. Indications: infection of urinary tract due to Pseudomonas aeruginosa   clonazePAM (KLONOPIN) 2 mg tablet   No No   Sig: Take 2 Tabs by mouth nightly. Max Daily Amount: 4 mg. Indications: takes for sleep   Patient taking differently: Take 6 mg by mouth nightly. Indications: takes for sleep   cyanocobalamin (VITAMIN B12) 500 mcg tablet   Yes No   Sig: Take 500 mcg by mouth daily. ferrous sulfate (IRON) 325 mg (65 mg iron) EC tablet   No No   Sig: Take 1 Tablet by mouth Daily (before breakfast) for 30 days. levothyroxine (SYNTHROID) 125 mcg tablet   No No   Sig: TAKE ONE TABLET BY MOUTH ONE TIME DAILY BEFORE BREAKFAST   potassium chloride (KLOR-CON) 10 mEq tablet   No No   Sig: Take 1 Tablet by mouth daily for 7 days.       Facility-Administered Medications: None     No Known Allergies   Social History     Tobacco Use    Smoking status: Former Smoker     Packs/day: 1.00     Years: 4.00     Pack years: 4.00     Types: Cigarettes     Quit date: 1963     Years since quittin.9    Smokeless tobacco: Never Used   Substance Use Topics    Alcohol use: Not Currently     Alcohol/week: 11.7 standard drinks     Types: 14 Cans of beer per week     Comment: advised stop drinking given issues      Social History     Social History Narrative    Not on file     Family History   Problem Relation Age of Onset    Cancer Mother         colon and ovarian    Cancer Brother         prostate    Alcohol abuse Brother     Cancer Maternal Grandmother         bone    Alcohol abuse Father     Alcohol abuse Sister     Stroke Paternal Grandfather         Current Facility-Administered Medications   Medication Dose Route Frequency    sodium chloride (NS) flush 5-40 mL  5-40 mL IntraVENous Q8H    sodium chloride (NS) flush 5-40 mL  5-40 mL IntraVENous PRN    acetaminophen (TYLENOL) tablet 650 mg  650 mg Oral Q6H PRN    Or    acetaminophen (TYLENOL) suppository 650 mg  650 mg Rectal Q6H PRN    polyethylene glycol (MIRALAX) packet 17 g  17 g Oral DAILY PRN    ondansetron (ZOFRAN ODT) tablet 4 mg  4 mg Oral Q8H PRN    Or    ondansetron (ZOFRAN) injection 4 mg  4 mg IntraVENous Q6H PRN    [START ON 9/27/2021] enoxaparin (LOVENOX) injection 40 mg  40 mg SubCUTAneous DAILY    ciprofloxacin (CIPRO) 400 mg in D5W IVPB (premix)  400 mg IntraVENous Q12H     Current Outpatient Medications   Medication Sig    ascorbic acid, vitamin C, (VITAMIN C) 500 mg tablet Take 500 mg by mouth daily.  cyanocobalamin (VITAMIN B12) 500 mcg tablet Take 500 mcg by mouth daily.  carvediloL (COREG) 3.125 mg tablet Take 2 Tablets by mouth two (2) times daily (with meals) for 30 days. Indications: high blood pressure    ciprofloxacin HCl (CIPRO) 750 mg tablet Take 1 Tablet by mouth two (2) times a day for 14 days. Indications: infection of urinary tract due to Pseudomonas aeruginosa    Lactobacillus Acidoph & Bulgar (Floranex) 1 million cell tab tablet Take 2 Tablets by mouth two (2) times a day for 15 days.  ferrous sulfate (IRON) 325 mg (65 mg iron) EC tablet Take 1 Tablet by mouth Daily (before breakfast) for 30 days.  potassium chloride (KLOR-CON) 10 mEq tablet Take 1 Tablet by mouth daily for 7 days.  acetaminophen (TYLENOL) 500 mg tablet Take 500 mg by mouth every six (6) hours as needed for Pain.     levothyroxine (SYNTHROID) 125 mcg tablet TAKE ONE TABLET BY MOUTH ONE TIME DAILY BEFORE BREAKFAST    clonazePAM (KLONOPIN) 2 mg tablet Take 2 Tabs by mouth nightly. Max Daily Amount: 4 mg. Indications: takes for sleep (Patient taking differently: Take 6 mg by mouth nightly. Indications: takes for sleep)    amitriptyline (ELAVIL) 100 mg tablet Take 1 Tab by mouth nightly. Dr Niecy Diop psych  Indications: takes for sleep    OLANZapine (ZYPREXA) 2.5 mg tablet Take 7.5 mg by mouth nightly. Indications: additional medications to treat depression, Takes 3 x 2.5 mg tabs    Cholecalciferol, Vitamin D3, (VITAMIN D3) 2,000 unit cap capsule Take 2,000 Units by mouth two (2) times a day. Review of Systems  A comprehensive review of systems was negative except for that written in the HPI. Objective:     Vitals:    09/26/21 1002 09/26/21 1005 09/26/21 1022 09/26/21 1041   BP: (!) 166/81 (!) 166/81 (!) 178/88 (!) 171/79   Pulse:  71     Resp:  16     Temp:  97.7 °F (36.5 °C)     SpO2:  96% 96% 98%     PHYSICAL EXAM   Physical Examination: General appearance - alert, well appearing, and in no distress  Mental status - alert, oriented to person, place, and time  Eyes - pupils equal and reactive, extraocular eye movements intact  Nose - normal and patent, no erythema, discharge or polyps  Neck - supple, no significant adenopathy  Chest - clear to auscultation, no wheezes, rales or rhonchi, symmetric air entry  Heart - normal rate, regular rhythm, normal S1, S2, no murmurs, rubs, clicks or gallops  Abdomen - soft, nontender, nondistended, no masses or organomegaly  Neurological - alert, oriented, normal speech, no focal findings or movement disorder noted  Musculoskeletal - no joint tenderness, deformity or swelling  Extremities - peripheral pulses normal, no pedal edema, no clubbing or cyanosis  Skin - normal coloration and turgor, no rashes, no suspicious skin lesions noted  Perineal wound is open but clean. There is no cellulitis.   No necrosis or skin sloughing. There is a small open area but no visible bowel or wound complications otherwise. No results for input(s): WBC, HGB, HCT, PLT, HGBEXT, HCTEXT, PLTEXT in the last 72 hours. No results for input(s): NA, K, CL, GLU, CO2, BUN, CREA, MG, PHOS, TROIQ, INR, BNPP, INREXT in the last 72 hours. No lab exists for component: TROIP  No results for input(s): PH, PCO2, PO2, HCO3 in the last 72 hours. Assessment:     Hospital Problems  Date Reviewed: 9/10/2021        Codes Class Noted POA    Bladder injury with open wound into cavity ICD-10-CM: S37.20XA  ICD-9-CM: 867.1  9/26/2021 Unknown            Plan:   Admit to general surgery. Consult urology Dr. Ene Bethea is aware. Regular diet  CT abdomen pelvis with p.o. and IV contrast  CBC CMP  Cipro 40 mg IV every 12  Dr. Jace Schaumann to return on Monday for further recommendations.       Tyler Hays, DO

## 2021-09-26 NOTE — PROGRESS NOTES
INITIAL SPIRITUAL ASSESSMENT     NOTED:      PATIENT IS BEING ADMITTED TO FLOOR FROM ER      PREFERRED NAME:  APRIL    NO MAYRA PREFERENCE    FRIEND - ASTON    FULL CODE    NO DIRECTIVES    MAJOR DEPRESSION    ANXIETY      WILL ASSESS HOW WE CAN BEST SERVE THIS FAMILY

## 2021-09-26 NOTE — PROGRESS NOTES
Problem: Falls - Risk of  Goal: *Absence of Falls  Description: Document Bard Centeno Fall Risk and appropriate interventions in the flowsheet.   Outcome: Progressing Towards Goal  Note: Fall Risk Interventions:  Mobility Interventions: Bed/chair exit alarm         Medication Interventions: Patient to call before getting OOB, Teach patient to arise slowly    Elimination Interventions: Call light in reach, Bed/chair exit alarm              Problem: Patient Education: Go to Patient Education Activity  Goal: Patient/Family Education  Outcome: Progressing Towards Goal

## 2021-09-26 NOTE — ED TRIAGE NOTES
Patient arrives to ED via EMS from home. Patient has bilateral nephrostomy tubes, a brito catheter, and colostomy. Patient came to ED today because his home health nurse states his urine was leaking out of his rectum. Vitals stable.

## 2021-09-26 NOTE — CONSULTS
Urology Consult    Subjective:     Date of Consultation:  September 26, 2021    Last night the patient started draining clear fluid from his perineal wound. He continues to have plenty of urine output into either nephrostomy tube but there is very little output from his Bishop. His Bishop was changed by his home health nurse earlier today but the decreased Bishop output put proceeded this change. He is not having any fever, nausea, or vomiting. He is known to have a large defect in his bladder on previous CT cystogram draining into the pelvis after total proctectomy. He has bilateral nephrostomies and a Bishop catheter. He also has a colostomy. Please refer to his previous notes where multiple intra-abdominal surgeries are delineated including previous intra-abdominal abscess, lysis of adhesions, and removal of infected mesh.     Past Medical History:   Diagnosis Date    Acute deep vein thrombosis (DVT) of non-extremity vein 5/20/2019    Family history of malignant neoplasm of gastrointestinal tract     mother colon/ovarian cancer 67    Fecal incontinence     LAR syndrome    Former cigarette smoker     History of rectal cancer     Hypothyroid     stable w/med    Infection and inflammatory reaction due to other internal prosthetic devices, implants and grafts, subsequent encounter 2017    abd wound/mesh colostomy    Insomnia     takes meds    Major depression     Osteoarthritis, hand     Personal history of colonic polyps 9/2013    x 1    Personal history of malignant neoplasm of rectum, rectosigmoid junction, and anus 9/2013    Psychiatric disorder     anxiety      Past Surgical History:   Procedure Laterality Date    COLONOSCOPY N/A 2/12/2018    COLONOSCOPY / BMI=21 performed by [facility] at [facility] ENDOSCOPY    COLONOSCOPY N/A 3/5/2021    COLONOSCOPY/BMI 19 performed by Vincent Barnard MD at [address] [place] [address]  2/12/2018         COLONOSCOPY THRU STOMA,LESN RMVL W/SNARE  3/5/2021         ENDOSCOPY, COLON, DIAGNOSTIC  last 2/3/15    Emily--no polyps--3 year recall    HX COLOSTOMY  16    HX GI  2016    Sacral nerve stimlator lead trial (unsucessful) and removal    HX HERNIA REPAIR  3/2015, 2016    HX HERNIA REPAIR      and Colostomy in May    HX OTHER SURGICAL  10/7/2013    Emily---endorectal us    HX OTHER SURGICAL Bilateral     cataracts      HX POLYPECTOMY      HX TOTAL COLECTOMY  2013    Emily--lap LAR with coloproctostomy, mobilization splenic flexure, diverting loop ileostomy    HX TOTAL COLECTOMY Right 2014    for leak    HX VASCULAR ACCESS Left     single lumen port/subsequently removed    IR CHANGE ABSCESS / CYST CATHETER  2021    IR CHANGE ABSCESS / CYST CATHETER  2021    IR CHANGE ABSCESS / CYST CATHETER  2021    IR CHANGE ABSCESS / CYST CATHETER  2021    IR INJ ABS CYST VIA CATHETER / TUBE  2021    IR INJ ABS CYST VIA CATHETER / TUBE  2021    IR INSERT TUNL CVC W/O PORT OVER 5 YR  2019    IR NEPHROSTOMY PERC LT PLC CATH  SI  2021    IR REPLACE CVC TUNNELED W/O PORT  2019      Family History   Problem Relation Age of Onset    Cancer Mother         colon and ovarian    Cancer Brother         prostate    Alcohol abuse Brother     Cancer Maternal Grandmother         bone    Alcohol abuse Father     Alcohol abuse Sister     Stroke Paternal Grandfather       Social History     Tobacco Use    Smoking status: Former Smoker     Packs/day: 1.00     Years: 4.00     Pack years: 4.00     Types: Cigarettes     Quit date: 1963     Years since quittin.9    Smokeless tobacco: Never Used   Substance Use Topics    Alcohol use: Not Currently     Alcohol/week: 11.7 standard drinks     Types: 14 Cans of beer per week     Comment: advised stop drinking given issues     No Known Allergies   Prior to Admission medications    Medication Sig Start Date End Date Taking? Authorizing Provider   ascorbic acid, vitamin C, (VITAMIN C) 500 mg tablet Take 500 mg by mouth daily. Provider, Historical   cyanocobalamin (VITAMIN B12) 500 mcg tablet Take 500 mcg by mouth daily. Provider, Historical   carvediloL (COREG) 3.125 mg tablet Take 2 Tablets by mouth two (2) times daily (with meals) for 30 days. Indications: high blood pressure 9/19/21 10/19/21  Dimas Landeros NP   ciprofloxacin HCl (CIPRO) 750 mg tablet Take 1 Tablet by mouth two (2) times a day for 14 days. Indications: infection of urinary tract due to Pseudomonas aeruginosa 9/19/21 10/3/21  Dimas Landeros NP   Lactobacillus Acidoph & Bulgar (Floranex) 1 million cell tab tablet Take 2 Tablets by mouth two (2) times a day for 15 days. 9/19/21 10/4/21  Dimas Landeros NP   ferrous sulfate (IRON) 325 mg (65 mg iron) EC tablet Take 1 Tablet by mouth Daily (before breakfast) for 30 days. 9/19/21 10/19/21  Dimas Landeros NP   potassium chloride (KLOR-CON) 10 mEq tablet Take 1 Tablet by mouth daily for 7 days. 9/19/21 9/26/21  Dimas Landeros NP   acetaminophen (TYLENOL) 500 mg tablet Take 500 mg by mouth every six (6) hours as needed for Pain. Provider, Historical   levothyroxine (SYNTHROID) 125 mcg tablet TAKE ONE TABLET BY MOUTH ONE TIME DAILY BEFORE BREAKFAST 12/10/20   Roger Cruz DO   clonazePAM (KLONOPIN) 2 mg tablet Take 2 Tabs by mouth nightly. Max Daily Amount: 4 mg. Indications: takes for sleep  Patient taking differently: Take 6 mg by mouth nightly. Indications: takes for sleep 6/17/19   Irma Casey MD   amitriptyline (ELAVIL) 100 mg tablet Take 1 Tab by mouth nightly. Dr Hussein Bobo psych  Indications: takes for sleep 6/17/19   Irma Casey MD   OLANZapine THE P[facility]ILIION) 2.5 mg tablet Take 7.5 mg by mouth nightly.  Indications: additional medications to treat depression, Takes 3 x 2.5 mg tabs    Provider, Historical   Cholecalciferol, Vitamin D3, (VITAMIN D3) 2,000 unit cap capsule Take 2,000 Units by mouth two (2) times a day. 10/15/18   Bridger Landon DO         Review of Systems: The patient denies fever, nausea, or abdominal pain. No chest pain or shortness of breath. Objective:     Patient Vitals for the past 8 hrs:   BP Temp Pulse Resp SpO2   21 1202 (!) 177/82  77  98 %   21 1041 (!) 171/79    98 %   21 1022 (!) 178/88    96 %   21 1005 (!) 166/81 97.7 °F (36.5 °C) 71 16 96 %   21 1002 (!) 166/81         Temp (24hrs), Av.7 °F (36.5 °C), Min:97.7 °F (36.5 °C), Max:97.7 °F (36.5 °C)      Intake and Output:   No intake/output data recorded. Physical Exam: Patient is in no distress. The abdomen is soft and appropriately tender. He has a midline surgical wound that is healing well. Both nephrostomy tube bags have plenty of luigi-colored urine within. Colostomy has stool in his external appliance. Phallus is unremarkable as are the scrotal contents. He has a 12 [place] Bishop catheter in place and there is a paucity of urine in the catheter bag. 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 Plan:     The patient is admitted to general surgery for further care and work-up. CT scan will be obtained. Particularly interested to know if the Bishop is in the bladder. Any surgical procedure to fix this potential bladder defect is going to be fraught with difficulty. Prior to considering this we need to make sure that we have done everything we can do to establish maximal drainage. 1 option would be bilateral ureteral catheter placement. Placing nephrostomy tubes to suction would also be a possibility.

## 2021-09-26 NOTE — PROGRESS NOTES
09/26/21 1450   Dual Skin Pressure Injury Assessment   Dual Skin Pressure Injury Assessment X   Second Care Provider (Based on [address] [place] [address]) BEATA Paniagua    Sacrum  Mid  (redness with open area )   Skin Integumentary   Skin Integumentary (WDL) X    Pressure  Injury Documentation Pressure Injury Noted-See Wound LDA to Document   Skin Color Pale   Skin Condition/Temp Cool;Dry; Inflamed   Skin Integrity Excoriation;Abrasion; Wound (add Wound LDA); Scars (comment)   Turgor Atrophic   Wound Prevention and Protection Methods   Orientation of Wound Prevention Posterior   Location of Wound Prevention Sacrum/Coccyx; Heel   Dressing Present  No   Wound Offloading (Prevention Methods) Bed, pressure reduction mattress;Pillows   Scabbed abrasion to the left anterior mid shin, abrasions to the right knee, edematous surgical scar to the right inner thigh, bilateral nephro tubes , colostomy to the left , scar to mid abdomen , redness and edema to the lest forearm and shoulder, right leg pedal edema, ecchymoses to right the mid abdomen, right buttock, left mid anterior thigh. Red to purple soft non tender  induration noted to right buttock.

## 2021-09-27 ENCOUNTER — HOME CARE VISIT (OUTPATIENT)
Dept: SCHEDULING | Facility: HOME HEALTH | Age: 76
End: 2021-09-27
Payer: MEDICARE

## 2021-09-27 VITALS
OXYGEN SATURATION: 100 % | SYSTOLIC BLOOD PRESSURE: 167 MMHG | DIASTOLIC BLOOD PRESSURE: 78 MMHG | RESPIRATION RATE: 16 BRPM | HEART RATE: 90 BPM | TEMPERATURE: 97.7 F

## 2021-09-27 LAB
SARS-COV-2, COV2: NORMAL
SARS-COV-2, COV2: NOT DETECTED
SPECIMEN SOURCE, FCOV2M: NORMAL

## 2021-09-27 PROCEDURE — 65270000029 HC RM PRIVATE

## 2021-09-27 PROCEDURE — 74011250637 HC RX REV CODE- 250/637: Performed by: SURGERY

## 2021-09-27 PROCEDURE — 74011250636 HC RX REV CODE- 250/636: Performed by: SURGERY

## 2021-09-27 PROCEDURE — 97535 SELF CARE MNGMENT TRAINING: CPT

## 2021-09-27 PROCEDURE — 97530 THERAPEUTIC ACTIVITIES: CPT

## 2021-09-27 PROCEDURE — 74011000250 HC RX REV CODE- 250: Performed by: SURGERY

## 2021-09-27 PROCEDURE — 97165 OT EVAL LOW COMPLEX 30 MIN: CPT

## 2021-09-27 PROCEDURE — 3331090001 HH PPS REVENUE CREDIT

## 2021-09-27 PROCEDURE — 86580 TB INTRADERMAL TEST: CPT | Performed by: SURGERY

## 2021-09-27 PROCEDURE — U0005 INFEC AGEN DETEC AMPLI PROBE: HCPCS

## 2021-09-27 PROCEDURE — 3331090002 HH PPS REVENUE DEBIT

## 2021-09-27 PROCEDURE — 97162 PT EVAL MOD COMPLEX 30 MIN: CPT

## 2021-09-27 RX ORDER — CARVEDILOL 6.25 MG/1
6.25 TABLET ORAL 2 TIMES DAILY WITH MEALS
Status: DISCONTINUED | OUTPATIENT
Start: 2021-09-27 | End: 2021-09-28 | Stop reason: HOSPADM

## 2021-09-27 RX ORDER — AMITRIPTYLINE HYDROCHLORIDE 50 MG/1
100 TABLET, FILM COATED ORAL
Status: DISCONTINUED | OUTPATIENT
Start: 2021-09-27 | End: 2021-09-28 | Stop reason: HOSPADM

## 2021-09-27 RX ORDER — OLANZAPINE 2.5 MG/1
7.5 TABLET ORAL
Status: DISCONTINUED | OUTPATIENT
Start: 2021-09-27 | End: 2021-09-28 | Stop reason: HOSPADM

## 2021-09-27 RX ORDER — LEVOTHYROXINE SODIUM 125 UG/1
125 TABLET ORAL
Status: DISCONTINUED | OUTPATIENT
Start: 2021-09-27 | End: 2021-09-28 | Stop reason: HOSPADM

## 2021-09-27 RX ORDER — CLONAZEPAM 1 MG/1
6 TABLET ORAL
Status: DISCONTINUED | OUTPATIENT
Start: 2021-09-27 | End: 2021-09-28 | Stop reason: HOSPADM

## 2021-09-27 RX ADMIN — AMITRIPTYLINE HYDROCHLORIDE 100 MG: 50 TABLET, FILM COATED ORAL at 21:03

## 2021-09-27 RX ADMIN — ACETAMINOPHEN 650 MG: 325 TABLET ORAL at 08:48

## 2021-09-27 RX ADMIN — CARVEDILOL 6.25 MG: 6.25 TABLET, FILM COATED ORAL at 17:24

## 2021-09-27 RX ADMIN — TUBERCULIN PURIFIED PROTEIN DERIVATIVE 5 UNITS: 5 INJECTION, SOLUTION INTRADERMAL at 11:08

## 2021-09-27 RX ADMIN — CIPROFLOXACIN 400 MG: 2 INJECTION, SOLUTION INTRAVENOUS at 21:03

## 2021-09-27 RX ADMIN — Medication 10 ML: at 06:27

## 2021-09-27 RX ADMIN — ENOXAPARIN SODIUM 40 MG: 40 INJECTION SUBCUTANEOUS at 14:59

## 2021-09-27 RX ADMIN — CIPROFLOXACIN 400 MG: 2 INJECTION, SOLUTION INTRAVENOUS at 08:48

## 2021-09-27 RX ADMIN — Medication 5 ML: at 21:03

## 2021-09-27 RX ADMIN — LEVOTHYROXINE SODIUM 125 MCG: 0.12 TABLET ORAL at 09:12

## 2021-09-27 RX ADMIN — CARVEDILOL 6.25 MG: 6.25 TABLET, FILM COATED ORAL at 08:48

## 2021-09-27 RX ADMIN — CLONAZEPAM 6 MG: 1 TABLET ORAL at 21:03

## 2021-09-27 RX ADMIN — OLANZAPINE 7.5 MG: 2.5 TABLET, FILM COATED ORAL at 21:03

## 2021-09-27 NOTE — WOUND CARE
Patient seen for reported buttock wound. Noted skin intact to buttock/sacral area. However there is a 1x2 cm oval shaped irregularity noted on right buttock. It has hard ridge then soft light purple hue to center that is boggy. No dressing is needed currently. Patient stated it has been present for a while. He has a old colostomy. Is caring for this himself. Wound team available if needed, please call.

## 2021-09-27 NOTE — PROGRESS NOTES
ACUTE OT GOALS:  (Developed with and agreed upon by patient and/or caregiver.)  1. Patient will complete lower body bathing and dressing in standing with Supervision and adaptive equipment as needed. 2. Patient will complete toileting with Supervision and use of adaptive equipment as needed. 3. Patient will tolerate 23 minutes of OT treatment with 1-2 rest breaks to increase activity tolerance for ADLs. 4. Patient will complete BUE exercises to increase strength in BUE for performance of ADLs and functional transfers. 5. Patient will complete functional transfers with Supervision and adaptive equipment as needed. 6. Patient will complete standing grooming ADL with Supervision and good dynamic standing balance. Timeframe: 7 visits       OCCUPATIONAL THERAPY ASSESSMENT: Initial Assessment, Daily Note and PM OT Treatment Day # 1    Armando Butterfield is a 66 y.o. male   PRIMARY DIAGNOSIS: <principal problem not specified>  Bladder injury with open wound into cavity [S37.20XA]       Reason for Referral:  Generalized Weakness  ICD-10: Treatment Diagnosis: Generalized Muscle Weakness (M62.81)  Difficulty in walking, Not elsewhere classified (R26.2)  [place]PAT[place] / [place] A [place] B / Product Type: Medicare /   ASSESSMENT:     REHAB RECOMMENDATIONS:   Recommendation to date pending progress:  Setting:   Short-term Rehab  Equipment:    To Be Determined     PRIOR LEVEL OF FUNCTION:  (Prior to Hospitalization)  INITIAL/CURRENT LEVEL OF FUNCTION:  (Based on today's evaluation)   Bathing:   Independent  Dressing:   Independent  Feeding/Grooming:   Independent  Toileting:   Independent  Functional Mobility:   Independent Bathing:   Contact Guard Assistance in standing. Dressin [address] in standing. Feeding/Grooming:   Contact Guard Assistance in standing.   Toileting:   Minimal Assistance  Functional Mobility:   CGA x 2 to Min A x 1     ASSESSMENT:   Craa was admitted for urine leakage from perineal incision. Pt with recent proctectomy and repair of cystotomy with surgical closure of the perineum. Presents with deficits in overall strength, balance, and activity tolerance for performance of ADLs and functional mobility. Pt received standing at sink with widened, unsteady base of support. Requires CGA to complete washing of hands. Requires CGA and additional time (8 minutes total) to brush teeth at sink. Requires CGA x 2 to Min A x 1 to walk from sink to room door and return to recliner chair for seated rest break. Pt with wide, unsteady gait; cued to hold head upright and narrow base of support for increased posture and balance. Following rest break, requires CGA to complete sit <> stand transfer and walk from chair to room door and return 2x. Pt would benefit from cotninued skilled OT to increase independence and safety for performance of ADLs and functional mobility. SUBJECTIVE:   Mr. Penny Martinez states, \"I'm sorry this takes me while. \" In reference to brushing his teeth. SOCIAL HISTORY/LIVING ENVIRONMENT: Lives alone in one story apartment with 0 MONICA. Independent for ADLs and functional mobility. Reports he drives and completes his own grocery shopping. Reports friends nearby for support system. Support Systems: Spouse/Significant Other, Caregiver/Home Care Staff    OBJECTIVE:     PAIN: VITAL SIGNS: LINES/DRAINS:   Pre Treatment: Pain Screen  Pain Scale 1: Numeric (0 - 10)  Pain Intensity 1: 0  Post Treatment: Unchanged   Colostomy, Bishop Catheter and Bilateral nephrostomy tubes.   O2 Device: None (Room air)     GROSS EVALUATION:  BUE Within Functional Limits Abnormal/ Functional Abnormal/ Non-Functional (see comments) Not Tested Comments:   AROM [x] [] [] []    PROM [] [] [] [x]    Strength [] [x] [] [] BUE   Balance [] [x] [] []    Posture [] [x] [] [] Slumped posture   Sensation [x] [] [] []    Coordination [x] [] [] []    Tone [] [] [] [x]    Edema [x] [] [] []    Activity Tolerance [] [x] [] [] Requires rest breaks    [] [] [] []      COGNITION/  PERCEPTION: Intact Impaired   (see comments) Comments:   Orientation [x] []    Vision [x] []    Hearing [x] []    Judgment/ Insight [] [x] Pt requires 8 minutes to brush teeth with toothpaste applied intermittently 4x total   Attention [x] []    Memory [x] []    Command Following [x] []    Emotional Regulation [x] []     [] []      ACTIVITIES OF DAILY LIVING: I Mod I S SBA CGA Min Mod Max Total NT Comments   BASIC ADLs:              Bathing/ Showering [] [] [] [] [] [] [] [] [] [x]    Toileting [] [] [] [] [] [] [] [] [] [x]    Dressing [] [] [] [x] [] [] [] [] [] [] Sock and shoe management in seated. Feeding [] [] [] [] [] [] [] [] [] [x]    Grooming [] [] [] [] [x] [] [] [] [] [] To brush teeth at sink. Personal Device Care [] [] [] [] [] [] [] [] [] [x]    Functional Mobility [] [] [] [] [x] [x] [] [] [] [] CGA x 2 to Min A x 1   I=Independent, Mod I=Modified Independent, S=Supervision, SBA=Standby Assistance, CGA=Contact Guard Assistance,   Min=Minimal Assistance, Mod=Moderate Assistance, Max=Maximal Assistance, Total=Total Assistance, NT=Not Tested    MOBILITY: I Mod I S SBA CGA Min Mod Max Total  NT x2 Comments:   Supine to sit [] [] [] [] [] [] [] [] [] [x] [] Received standing at sink. Sit to supine [] [] [] [] [] [] [] [] [] [x] []    Sit to stand [] [] [] [] [x] [] [] [] [] [] []    Bed to chair [] [] [] [] [] [] [] [] [] [x] [] CGA x 2 to Min A x 1 for room mobility. I=Independent, Mod I=Modified Independent, S=Supervision, SBA=Standby Assistance, CGA=Contact Guard Assistance,   Min=Minimal Assistance, Mod=Moderate Assistance, Max=Maximal Assistance, Total=Total Assistance, NT=Not [address]   Daily Activity Inpatient Short Form        How much help from another person does the patient currently need. .. Total A Lot A Little None   1.   Putting on and taking off regular lower body clothing? [] 1   [] 2   [x] 3   [] 4   2. Bathing (including washing, rinsing, drying)? [] 1   [] 2   [x] 3   [] 4   3. Toileting, which includes using toilet, bedpan or urinal?   [] 1   [] 2   [x] 3   [] 4   4. Putting on and taking off regular upper body clothing? [] 1   [] 2   [x] 3   [] 4   5. Taking care of personal grooming such as brushing teeth? [] 1   [] 2   [x] 3   [] 4   6. Eating meals? [] 1   [] 2   [] 3   [x] 4   © 2007, Trustees [address] [address], under license to [employer]. All rights reserved     Score:  Initial: 19, completed, 9/27/2021 Most Recent: X (Date: -- )   Interpretation of Tool:  Represents activities that are increasingly more difficult (i.e. Bed mobility, Transfers, Gait). PLAN:   FREQUENCY/DURATION: OT Plan of Care: 3 times/week for duration of hospital stay or until stated goals are met, whichever comes first.    PROBLEM LIST:   (Skilled intervention is medically necessary to address:)  1. Decreased ADL/Functional Activities  2. Decreased Activity Tolerance  3. Decreased Balance  4. Decreased Cognition  5. Decreased Gait Ability  6. Decreased Strength  7. Decreased Transfer Abilities   INTERVENTIONS PLANNED:   (Benefits and precautions of occupational therapy have been discussed with the patient.)  1. Self Care Training  2. Therapeutic Activity  3. Therapeutic Exercise/HEP  4. Neuromuscular Re-education  5. Education     TREATMENT:     EVALUATION: Low Complexity : (Untimed Charge)    TREATMENT:   Self Care (12 Minutes): Self care including Lower Body Dressing and Grooming to increase independence and decrease level of assistance required. TREATMENT GRID:  N/A    AFTER TREATMENT POSITION/PRECAUTIONS:  Alarm Activated, Chair, Needs within reach, RN notified and table tray adjacent to pt.     INTERDISCIPLINARY COLLABORATION:  RN/PCT, PT/PTA and OT/TURK    TOTAL TREATMENT DURATION:  OT Patient Time In/Time Out  Time In: 1354  Time Out: [facility] 380, OTR/L

## 2021-09-27 NOTE — PROGRESS NOTES
Pt is a 67 yo male admitted due to a bladder injury. Pt was current with [facility] and [facility] prior to admission. Plans were in progress for the pt to admit to [facility] today from home but he is hospitalized. Inpatient referral submitted to [facility] and SW confirmed that they plan to accept the pt when medically cleared for dc. Pt will need a new PPD and COVID test since the results from the outpatient tests are not available in the EMR. Pt updated on DCP and he is in agreement. SW following to facilitate pt's transfer to rehab at [place]. Care Management Interventions  PCP Verified by CM: Yes  Last Visit to PCP: 08/03/21  Mode of Transport at Discharge: BLS  Transition of Care Consult (CM Consult): SNF  Partner SNF: Yes  Discharge Durable Medical Equipment: No  Physical Therapy Consult: Yes  Occupational Therapy Consult: Yes  Speech Therapy Consult: No  Support Systems: Spouse/Significant Other, Caregiver/Home Care Staff  Confirm Follow Up Transport: Family  The Plan for Transition of Care is Related to the Following Treatment Goals : STR to improve pt's strength and functional abilities for a safe transition to home.   The Patient and/or Patient Representative was Provided with a Choice of Provider and Agrees with the Discharge Plan?: Yes  Discharge Location  Discharge Placement: Rehab Unit Subacute ([facility])

## 2021-09-27 NOTE — PROGRESS NOTES
Alpesh Talbert Cara    9/27/2021    Date of Admission:  9/26/2021      Subjective: This is a 57-year-old male with complex medical history of rectal cancer and colon resection in the past with recent completion proctectomy with perineal approach and repair of cystotomy with plastic surgery closure of the perineum who presents complaining of urine leakage from his perineal incision. He has bilateral nephrostomy tubes and Bishop catheter which was replaced yesterday. He states that he began draining clear urine consistency leakage from his perineal wound. He states that every time he stands up the wound leaks what seems to be urine. There was a known bladder injury during his most recent surgery on 8/27/2021 with bladder repair. He denies any pain. He denies any associated fevers nausea or vomiting. He presented to the emergency department for evaluation of bladder leak through the perineum and surgery is consulted for admission. Lab work and CT abdomen pelvis are pending. Dr. Bonita Walters of urology is aware. 9/27/21 Awake in bed, still c/o leakage from his perineal wound. Bishop repositioned and bilateral NT draining urine appropriately.      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) 05/13/2021    Anxiety 05/11/2021    UTI (urinary tract infection) 05/11/2021    Bilateral hydronephrosis 05/11/2021    Dilated intrahepatic bile duct 05/11/2021    Pancreatic duct dilated 05/11/2021    Major depression     Hypothyroid 06/14/2019    Fever 06/13/2019    Acute UTI (urinary tract infection) 06/13/2019    Anemia 06/13/2019    Elevated LFTs 06/13/2019    Acute blood loss anemia 05/21/2019    Acute deep vein thrombosis (DVT) of non-extremity vein 05/20/2019    DVT (deep vein thrombosis) in pregnancy 05/20/2019    Severe protein-calorie malnutrition ([address] 05/19/2019    On total parenteral nutrition (TPN) 05/19/2019    Colostomy care ([address] 05/19/2019    Chronic indwelling Bishop catheter 05/19/2019    Moderate protein-calorie malnutrition ([address] 05/19/2019    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    Small bowel obstruction (HCC) 03/30/2019    SBO (small bowel obstruction) (HCC) 03/22/2019    Infection of implant 03/30/2017    Abdominal wall abscess 05/22/2016    Infected prosthetic mesh of abdominal wall (HCC) 05/22/2016    Abscess 05/22/2016    Fecal incontinence 05/11/2016    Bowel incontinence 05/11/2016    Weight loss 11/19/2015    Hypercalcemia of malignancy 11/19/2015    Hypokalemia 08/29/2014    Hypomagnesemia 08/29/2014    Pleural effusion 08/29/2014    Hypernatremia 08/29/2014    Severe sepsis with acute organ dysfunction (HCC) 08/24/2014    Hypoxemia 08/24/2014    Peritonitis (acute) generalized 08/24/2014    Attention to ileostomy ([address] 08/20/2014    Dehydration 04/17/2014    Ileus ([address] 03/20/2014    Postoperative ileus ([address] 02/07/2014    Constipation 10/29/2013    Rectal cancer ([address] 10/22/2013     Past Medical History:   Diagnosis Date    Acute deep vein thrombosis (DVT) of non-extremity vein 5/20/2019    Family history of malignant neoplasm of gastrointestinal tract     mother colon/ovarian cancer 67    Fecal incontinence     LAR syndrome    Former cigarette smoker     History of rectal cancer     Hypothyroid     stable w/med    Infection and inflammatory reaction due to other internal prosthetic devices, implants and grafts, subsequent encounter 2017    abd wound/mesh colostomy    Insomnia     takes meds    Major depression     Osteoarthritis, hand     Personal history of colonic polyps 9/2013    x 1    Personal history of malignant neoplasm of rectum, rectosigmoid junction, and anus 9/2013    Psychiatric disorder     anxiety      Past Surgical History:   Procedure Laterality Date    COLONOSCOPY N/A 2/12/2018    COLONOSCOPY / BMI=21 performed by Linda Giang MD at [address] [facility] N/A 3/5/2021    COLONOSCOPY/BMI 19 performed by Alejandra Perera MD at [address] [place] [address]  2/12/2018         COLONOSCOPY THRU STOMA,LESN RMVL W/SNARE  3/5/2021         ENDOSCOPY, COLON, DIAGNOSTIC  last 2/3/15    Emily--no polyps--3 year recall    HX COLOSTOMY  5/11/16    HX GI  4/2016    Sacral nerve stimlator lead trial (unsucessful) and removal    HX HERNIA REPAIR  3/2015, 5/2016    HX HERNIA REPAIR      and Colostomy in May    HX OTHER SURGICAL  10/7/2013    Emily---endorectal us    HX OTHER SURGICAL Bilateral     cataracts  2017    HX POLYPECTOMY      HX TOTAL COLECTOMY  11/2013    Emily--lap LAR with coloproctostomy, mobilization splenic flexure, diverting loop ileostomy    HX TOTAL COLECTOMY Right 8/2014    for leak    HX VASCULAR ACCESS Left 4/14    single lumen port/subsequently removed    IR CHANGE ABSCESS / CYST CATHETER  5/27/2021    IR CHANGE ABSCESS / CYST CATHETER  6/14/2021    IR CHANGE ABSCESS / CYST CATHETER  7/8/2021    IR CHANGE ABSCESS / CYST CATHETER  8/5/2021    IR INJ ABS CYST VIA CATHETER / TUBE  5/20/2021    IR INJ ABS CYST VIA CATHETER / TUBE  6/4/2021    IR INSERT TUNL CVC W/O PORT OVER 5 YR  5/20/2019    IR NEPHROSTOMY PERC LT PLC CATH  SI  9/16/2021    IR REPLACE CVC TUNNELED W/O PORT  5/30/2019      Prior to Admission Medications   Prescriptions Last Dose Informant Patient Reported? Taking? Cholecalciferol, Vitamin D3, (VITAMIN D3) 2,000 unit cap capsule   No No   Sig: Take 2,000 Units by mouth two (2) times a day. Lactobacillus Acidoph & Bulgar (Floranex) 1 million cell tab tablet   No No   Sig: Take 2 Tablets by mouth two (2) times a day for 15 days.    OLANZapine (ZYPREXA) 2.5 mg tablet Yes No   Sig: Take 7.5 mg by mouth nightly. Indications: additional medications to treat depression, Takes 3 x 2.5 mg tabs   acetaminophen (TYLENOL) 500 mg tablet   Yes No   Sig: Take 500 mg by mouth every six (6) hours as needed for Pain. amitriptyline (ELAVIL) 100 mg tablet   No No   Sig: Take 1 Tab by mouth nightly. Dr Wolfe Sendalec psych  Indications: takes for sleep   ascorbic acid, vitamin C, (VITAMIN C) 500 mg tablet   Yes No   Sig: Take 500 mg by mouth daily. carvediloL (COREG) 3.125 mg tablet   No No   Sig: Take 2 Tablets by mouth two (2) times daily (with meals) for 30 days. Indications: high blood pressure   ciprofloxacin HCl (CIPRO) 750 mg tablet   No No   Sig: Take 1 Tablet by mouth two (2) times a day for 14 days. Indications: infection of urinary tract due to Pseudomonas aeruginosa   clonazePAM (KLONOPIN) 2 mg tablet   No No   Sig: Take 2 Tabs by mouth nightly. Max Daily Amount: 4 mg. Indications: takes for sleep   Patient taking differently: Take 6 mg by mouth nightly. Indications: takes for sleep   cyanocobalamin (VITAMIN B12) 500 mcg tablet   Yes No   Sig: Take 500 mcg by mouth daily. ferrous sulfate (IRON) 325 mg (65 mg iron) EC tablet   No No   Sig: Take 1 Tablet by mouth Daily (before breakfast) for 30 days. levothyroxine (SYNTHROID) 125 mcg tablet   No No   Sig: TAKE ONE TABLET BY MOUTH ONE TIME DAILY BEFORE BREAKFAST   potassium chloride (KLOR-CON) 10 mEq tablet   No No   Sig: Take 1 Tablet by mouth daily for 7 days.       Facility-Administered Medications: None     No Known Allergies   Social History     Tobacco Use    Smoking status: Former Smoker     Packs/day: 1.00     Years: 4.00     Pack years: 4.00     Types: Cigarettes     Quit date: 1963     Years since quittin.9    Smokeless tobacco: Never Used   Substance Use Topics    Alcohol use: Not Currently     Alcohol/week: 11.7 standard drinks     Types: 14 Cans of beer per week     Comment: advised stop drinking given issues Social History     Social History Narrative    Not on file     Family History   Problem Relation Age of Onset    Cancer Mother         colon and ovarian    Cancer Brother         prostate    Alcohol abuse Brother     Cancer Maternal Grandmother         bone    Alcohol abuse Father     Alcohol abuse Sister     Stroke Paternal Grandfather         Current Facility-Administered Medications   Medication Dose Route Frequency    amitriptyline (ELAVIL) tablet 100 mg  100 mg Oral QHS    carvediloL (COREG) tablet 6.25 mg  6.25 mg Oral BID WITH MEALS    clonazePAM (KlonoPIN) tablet 6 mg  6 mg Oral QHS    levothyroxine (SYNTHROID) tablet 125 mcg  125 mcg Oral ACB    OLANZapine (ZyPREXA) tablet 7.5 mg  7.5 mg Oral QHS    tuberculin injection 5 Units  5 Units IntraDERMal ONCE    sodium chloride (NS) flush 5-40 mL  5-40 mL IntraVENous Q8H    sodium chloride (NS) flush 5-40 mL  5-40 mL IntraVENous PRN    acetaminophen (TYLENOL) tablet 650 mg  650 mg Oral Q6H PRN    Or    acetaminophen (TYLENOL) suppository 650 mg  650 mg Rectal Q6H PRN    polyethylene glycol (MIRALAX) packet 17 g  17 g Oral DAILY PRN    ondansetron (ZOFRAN ODT) tablet 4 mg  4 mg Oral Q8H PRN    Or    ondansetron (ZOFRAN) injection 4 mg  4 mg IntraVENous Q6H PRN    enoxaparin (LOVENOX) injection 40 mg  40 mg SubCUTAneous Q24H    ciprofloxacin (CIPRO) 400 mg in D5W IVPB (premix)  400 mg IntraVENous Q12H       Review of Systems  A comprehensive review of systems was negative except for that written in the HPI.     Objective:     Vitals:    09/27/21 0043 09/27/21 0615 09/27/21 0851 09/27/21 1147   BP: 116/63 (!) 140/88 (!) 123/55 119/70   Pulse: 68 92 90 67   Resp: 16 16 16    Temp: 98.4 °F (36.9 °C) 98 °F (36.7 °C) 98.8 °F (37.1 °C) 99 °F (37.2 °C)   SpO2: 95% 94% 95% 97%     PHYSICAL EXAM   Physical Examination: General appearance - alert, well appearing, and in no distress  Mental status - alert, oriented to person, place, and time  Eyes - pupils equal and reactive, extraocular eye movements intact  Nose - normal and patent, no erythema, discharge or polyps  Neck - supple, no significant adenopathy  Chest - clear to auscultation, no wheezes, rales or rhonchi, symmetric air entry  Heart - normal rate, regular rhythm, normal S1, S2, no murmurs, rubs, clicks or gallops  Abdomen - soft, nontender, nondistended, no masses or organomegaly  Neurological - alert, oriented, normal speech, no focal findings or movement disorder noted  Musculoskeletal - no joint tenderness, deformity or swelling  Extremities - peripheral pulses normal, no pedal edema, no clubbing or cyanosis  Skin - normal coloration and turgor, no rashes, no suspicious skin lesions noted  Perineal wound is open but clean. There is no cellulitis. No necrosis or skin sloughing. There is a small open area but no visible bowel or wound complications otherwise. Recent Labs     09/26/21  1535   WBC 6.6   HGB 8.4*   HCT 27.8*   *     Recent Labs     09/26/21  1535      K 4.1      GLU 88   CO2 30   BUN 25*   CREA 1.29     No results for input(s): PH, PCO2, PO2, HCO3 in the last 72 hours.     Assessment:     Hospital Problems  Date Reviewed: 9/10/2021        Codes Class Noted POA    Bladder injury with open wound into cavity ICD-10-CM: S37.20XA  ICD-9-CM: 867.1  9/26/2021 Unknown            Plan:   Continue present care  NT and brito per urology  Patient ultimately ok for discharge but he wishes to go to SNF  Await acceptance  Will DC to SNF when bed available    Hampton Bumpers, NP

## 2021-09-27 NOTE — PROGRESS NOTES
Pt insisted that he have his bedtime meds (Elavil, Zyprexa, Klonopin). Two attempts were made to have MD paged through answering service without call back.

## 2021-09-27 NOTE — PROGRESS NOTES
Follow-up visit with Mr. Paulson Plan to explore patient's spiritual/emotional needs. Mr. Paulson Plan shared about his recent surgery, surprising results, and his focus to see \"what's next. \" Chaplains remain available for support.      Yogesh Bro 66  Board Certified

## 2021-09-27 NOTE — PROGRESS NOTES
ACUTE PHYSICAL THERAPY GOALS:  (Developed with and agreed upon by patient and/or caregiver.)  (1.) Parker Garcia will move from supine to sit and sit to supine , scoot up and down and roll side to side with MODIFIED INDEPENDENCE within 7 treatment day(s). (2.) Parker Garcia will transfer from bed to chair and chair to bed with SUPERVISION using the least restrictive device within 7 treatment day(s). (3.) Parul Marroquin will ambulate with SUPERVISION for 300+ feet with the least restrictive device within 7 treatment day(s). (4.) Parker Garcia will perform standing static and dynamic balance activities x 25 minutes with STAND BY ASSIST to improve safety within 7 treatment day(s). (5.) Parker Garcia will perform therapeutic exercises x 20 min for HEP with SUPERVISION to improve strength, endurance, and functional mobility within 7 treatment day(s).      PHYSICAL THERAPY ASSESSMENT: Initial Assessment PT Treatment Day # 1    Parker Garcia is a 66 y.o. male   PRIMARY DIAGNOSIS: <principal problem not specified>  Bladder injury with open wound into cavity [S37.20XA]     Reason for Referral:   ICD-10: Treatment Diagnosis: Difficulty in walking, Not elsewhere classified (R26.2)  Other abnormalities of gait and mobility (R26.89)  [place]PAT[place] / [place] / Product Type: Medicare /     ASSESSMENT:     REHAB RECOMMENDATIONS:   Recommendation to date pending progress:  Setting:   Short-term Rehab  Equipment:    To Be Determined     PRIOR LEVEL OF FUNCTION:  (Prior to Hospitalization) INITIAL/CURRENT LEVEL OF FUNCTION:  (Most Recently Demonstrated)   Bed Mobility:   Independent  Sit to Stand:   Independent  Transfers:   Independent  Gait/Mobility:   Independent Bed Mobility:   Contact Guard Assistance  Sit to Stand:   Contact Guard Assistance  Transfers:   Contact Guard Assistance  Gait/Mobility:   Contact Guard Assistance to [address] [place] [address] ASSESSMENT:  Mr. Kiki Godoy is a 66year old M who presents to hospital with urine leaking out of his rectum. Pt with bilateral nephrostomy tubes, brito catheter, colostomy. Pt was admitted for further work up. This date pt performs mobility including bed mobility, sitting balance activities, sit <> stand transfers, standing balance activities, and ambulation in room with CGA to Min A. Pt with decreased standing balance control, decreased insight into safety and deficits. He would likely benefit from a RW, but was not agreeable to using one this session. Pt is a fall risk. Pt presents as functioning below his baseline, with deficits in mobility including transfers, gait, balance, and activity tolerance. Pt will benefit from skilled therapy services to address stated deficits to promote return to highest level of function, independence, and safety. Will continue to follow. SUBJECTIVE:   Mr. Kiki Godoy states, \"I want to get stronger and steadier. \"    SOCIAL HISTORY/LIVING ENVIRONMENT: Lives alone in a one level apartment with no steps to enter. States he does not have family nearby, but does have friends who support him. Endorses a few falls in the past year.    Support Systems: Spouse/Significant Other, Caregiver/Home Care Staff  OBJECTIVE:     PAIN: VITAL SIGNS: LINES/DRAINS:   Pre Treatment: Pain Screen  Pain Scale 1: Numeric (0 - 10)  Pain Intensity 1: 0  Post Treatment: 0/10 Vital Signs  O2 Device: None (Room air) Colostomy, Brito Catheter and nephrostomy tubes  O2 Device: None (Room air)     GROSS EVALUATION:  BLE Within Functional Limits Abnormal/ Functional Abnormal/ Non-Functional (see comments) Not Tested Comments:   AROM [x] [] [] []    PROM [x] [] [] []    Strength [] [x] [] []  generally decreased strength   Balance [] [x] [] []  unsteady on feet   Posture [] [x] [] []  forward head, rounded shoulders   Sensation [x] [] [] []    Coordination [x] [] [] []    Tone [] [] [] [x]    Edema [] [] [] []    Activity Tolerance [] [x] [] []     [] [] [] []      COGNITION/  PERCEPTION: Intact Impaired   (see comments) Comments:   Orientation [x] []    Vision [x] []    Hearing [x] []    Command Following [x] []    Safety Awareness [] [x]     [] []      MOBILITY: I Mod I S SBA CGA Min Mod Max Total  NT x2 Comments:   Bed Mobility    Rolling [] [] [] [] [x] [] [] [] [] [] []    Supine to Sit [] [] [] [] [x] [] [] [] [] [] []    Scooting [] [] [] [] [x] [] [] [] [] [] []    Sit to Supine [] [] [] [] [x] [] [] [] [] [] []    Transfers    Sit to Stand [] [] [] [] [x] [] [] [] [] [] []    Bed to Chair [] [] [] [] [x] [x] [] [] [] [] []    Stand to Sit [] [] [] [] [x] [] [] [] [] [] []    I=Independent, Mod I=Modified Independent, S=Supervision, SBA=Standby Assistance, CGA=Contact Guard Assistance,   Min=Minimal Assistance, Mod=Moderate Assistance, Max=Maximal Assistance, Total=Total Assistance, NT=Not Tested  GAIT: I Mod I S SBA CGA Min Mod Max Total  NT x2 Comments:   Level of Assistance [] [] [] [] [x] [x] [] [] [] [] []    Distance 60 ft    DME hand held assist    Gait Quality Widened ELIAZAR, unsteady, shuffled, short steps    Weightbearing Status N/A     I=Independent, Mod I=Modified Independent, S=Supervision, SBA=Standby Assistance, CGA=Contact Guard Assistance,   Min=Minimal Assistance, Mod=Moderate Assistance, Max=Maximal Assistance, Total=Total Assistance, NT=Not [facility]       How much difficulty does the patient currently have. .. Unable A Lot A Little None   1. Turning over in bed (including adjusting bedclothes, sheets and blankets)? [] 1   [] 2   [x] 3   [] 4   2. Sitting down on and standing up from a chair with arms ( e.g., wheelchair, bedside commode, etc.)   [] 1   [] 2   [x] 3   [] 4   3. Moving from lying on back to sitting on the side of the bed?    [] 1   [] 2   [x] 3   [] 4   How much help from another person does the patient currently need... Total A Lot A Little None   4. Moving to and from a bed to a chair (including a wheelchair)? [] 1   [] 2   [x] 3   [] 4   5. Need to walk in hospital room? [] 1   [] 2   [x] 3   [] 4   6. Climbing 3-5 steps with a railing? [] 1   [] 2   [x] 3   [] 4   © 2007, Trustees of [address] [place] [address] [address] [id number], under license to [employer]. All rights reserved     Score:  Initial: 18 Most Recent: X (Date: -- )    Interpretation of Tool:  Represents activities that are increasingly more difficult (i.e. Bed mobility, Transfers, Gait). PLAN:   FREQUENCY/DURATION: PT Plan of Care: 3 times/week for duration of hospital stay or until stated goals are met, whichever comes first.    PROBLEM LIST:   (Skilled intervention is medically necessary to address:)  1. Decreased Activity Tolerance  2. Decreased AROM/PROM  3. Decreased Balance  4. Decreased Coordination  5. Decreased Gait Ability  6. Decreased Strength  7. Decreased Transfer Abilities   INTERVENTIONS PLANNED:   (Benefits and precautions of physical therapy have been discussed with the patient.)  1. Therapeutic Activity  2. Therapeutic Exercise/HEP  3. Neuromuscular Re-education  4. Gait Training  5. Education     TREATMENT:     EVALUATION: Moderate Complexity : (Untimed Charge)    TREATMENT:   ($$ Therapeutic Activity: 8-22 mins    )  Therapeutic Activity (14 Minutes): Therapeutic activity included Transfer Training, Ambulation on level ground, Sitting balance  and Standing balance to improve functional Mobility, Strength and Activity tolerance.     TREATMENT GRID:  N/A    AFTER TREATMENT POSITION/PRECAUTIONS:  Alarm Activated, Chair, Needs within reach and RN notified    INTERDISCIPLINARY COLLABORATION:  RN/PCT and PT/PTA    TOTAL TREATMENT DURATION:  PT Patient Time In/Time Out  Time In: 1354  Time Out: 3[facility], PT

## 2021-09-27 NOTE — PROGRESS NOTES
MD called after CT w/ the report of the brito cath balloon inflated in the urethra. Brito was replaced.

## 2021-09-27 NOTE — PROGRESS NOTES
Problem: Falls - Risk of  Goal: *Absence of Falls  Description: Document Bard Centeno Fall Risk and appropriate interventions in the flowsheet.   Outcome: Progressing Towards Goal  Note: Fall Risk Interventions:  Mobility Interventions: Bed/chair exit alarm, OT consult for ADLs, Patient to call before getting OOB, PT Consult for mobility concerns         Medication Interventions: Patient to call before getting OOB, Bed/chair exit alarm    Elimination Interventions: Call light in reach, Patient to call for help with toileting needs, Toileting schedule/hourly rounds              Problem: Patient Education: Go to Patient Education Activity  Goal: Patient/Family Education  Outcome: Progressing Towards Goal     Problem: Patient Education: Go to Patient Education Activity  Goal: Patient/Family Education  Outcome: Progressing Towards Goal

## 2021-09-27 NOTE — PROGRESS NOTES
Follow-up with unit staff to explore patient needs. Chaplains remain available for support.      Yogesh Toussaint 66  Board Certified

## 2021-09-27 NOTE — PROGRESS NOTES
Admit Date: 9/26/2021    Subjective:     Ana Marroquin is awake/alert. Brito catheter in place draining clear luigi colored urine (300 ml in the bag). Brito actively draining w/o issue currently. Bilateral NT in place with clear luigi urine (880/900 ml OP). No new labs. VSS. Objective:     Patient Vitals for the past 8 hrs:   BP Temp Pulse Resp SpO2   09/27/21 0851 (!) 123/55 98.8 °F (37.1 °C) 90 16 95 %   09/27/21 0615 (!) 140/88 98 °F (36.7 °C) 92 16 94 %     09/27 0701 - 09/27 1900  In: 600 [P.O.:600]  Out: -   09/25 1901 - 09/27 0700  In: -   Out: 0604 [Urine:1780]    Physical Exam:  GENERAL: alert, cooperative, no distress  LUNG: clear to auscultation bilaterally  HEART: regular rate and rhythm, S1, S2   ABDOMEN: soft, non-tender, colostomy with stool  NEUROLOGIC: AOx3  : bilateral NT and brito in place with luigi colored urine    Data Review   Recent Results (from the past 24 hour(s))   METABOLIC PANEL, COMPREHENSIVE    Collection Time: 09/26/21  3:35 PM   Result Value Ref Range    Sodium 140 136 - 145 mmol/L    Potassium 4.1 3.5 - 5.1 mmol/L    Chloride 106 98 - 107 mmol/L    CO2 30 21 - 32 mmol/L    Anion gap 4 (L) 7 - 16 mmol/L    Glucose 88 65 - 100 mg/dL    BUN 25 (H) 8 - 23 MG/DL    Creatinine 1.29 0.8 - 1.5 MG/DL    GFR est AA >60 >60 ml/min/1.73m2    GFR est non-AA 57 (L) >60 ml/min/1.73m2    Calcium 8.9 8.3 - 10.4 MG/DL    Bilirubin, total 0.2 0.2 - 1.1 MG/DL    ALT (SGPT) 26 12 - 65 U/L    AST (SGOT) 24 15 - 37 U/L    Alk.  phosphatase 85 50 - 136 U/L    Protein, total 6.9 6.3 - 8.2 g/dL    Albumin 2.4 (L) 3.2 - 4.6 g/dL    Globulin 4.5 (H) 2.3 - 3.5 g/dL    A-G Ratio 0.5 (L) 1.2 - 3.5     CBC W/O DIFF    Collection Time: 09/26/21  3:35 PM   Result Value Ref Range    WBC 6.6 4.3 - 11.1 K/uL    RBC 3.02 (L) 4.23 - 5.6 M/uL    HGB 8.4 (L) 13.6 - 17.2 g/dL    HCT 27.8 (L) 41.1 - 50.3 %    MCV 92.1 79.6 - 97.8 FL    MCH 27.8 26.1 - 32.9 PG    MCHC 30.2 (L) 31.4 - 35.0 g/dL    RDW 14.6 11.9 - 14.6 %    PLATELET 529 (H) 891 - 450 K/uL    MPV 8.4 (L) 9.4 - 12.3 FL    ABSOLUTE NRBC 0.00 0.0 - 0.2 K/uL       Assessment:     Active Problems:    Bladder injury with open wound into cavity (9/26/2021)         66 y.o. male with a past medical hx of kidney stones, pelvic abscess and urinary retention who had a completion proctectomy by Dr. Pieter Rhodes. A cystotomy occurred during surgery and was repaired intra-operatively by Dr. Pieter Rhodes. He presented for follow-up on 9/10 with cystogram performed in office showing no bladder injury with complete healing of repair and L grade 3 vesico-ureteral reflux. Presented to ER 9/15 with REBEKAH/UTI (urine culture- pseudomonas). He was txt for UTI. CT cystogram showed extravasation  through a large defect within the posterior bladder wall, fills the presacral space and approaches an apparent perianal fistulous tract. SP bilateral NT placement 9/16. He d/c home with bilateral NT/brito catheter with plans to complete UTI txt and RTO for cystoscopy to assess for bladder healing. Returns with c/o leaking from perineal wound. CT shows brito to be in urethra. New brito reinserted and now appears to be in correct place. CT showed redemonstrated dorsal bladder rupture. Pt is comfortable at present time with VSS. All tubes draining well. On IV cipro. Plan:     Primary mgmt per general surgery. Continue all tubes to drainage. Brito now in correct position. Mookie Jha NP  [facility]    I have reviewed the above note. I agree with the HPI, exam, assessment and plan as outlined by the nurse practitioner. Jack Dodge M.D.     [facility]  [place] [address] [address] [facility]  Phone: [phone]  Fax: [phone]

## 2021-09-28 VITALS
RESPIRATION RATE: 18 BRPM | SYSTOLIC BLOOD PRESSURE: 106 MMHG | TEMPERATURE: 97.4 F | HEART RATE: 72 BPM | DIASTOLIC BLOOD PRESSURE: 52 MMHG | OXYGEN SATURATION: 97 %

## 2021-09-28 LAB
MM INDURATION POC: 0 MM (ref 0–5)
PPD POC: NEGATIVE NEGATIVE

## 2021-09-28 PROCEDURE — 97530 THERAPEUTIC ACTIVITIES: CPT

## 2021-09-28 PROCEDURE — 74011250636 HC RX REV CODE- 250/636: Performed by: SURGERY

## 2021-09-28 PROCEDURE — 97535 SELF CARE MNGMENT TRAINING: CPT

## 2021-09-28 PROCEDURE — 74011250637 HC RX REV CODE- 250/637: Performed by: SURGERY

## 2021-09-28 PROCEDURE — 3331090001 HH PPS REVENUE CREDIT

## 2021-09-28 PROCEDURE — 2709999900 HC NON-CHARGEABLE SUPPLY

## 2021-09-28 PROCEDURE — 99233 SBSQ HOSP IP/OBS HIGH 50: CPT | Performed by: UROLOGY

## 2021-09-28 PROCEDURE — 3331090002 HH PPS REVENUE DEBIT

## 2021-09-28 RX ORDER — CLONAZEPAM 2 MG/1
4 TABLET ORAL
Qty: 15 TABLET | Refills: 0 | Status: SHIPPED | OUTPATIENT
Start: 2021-09-28

## 2021-09-28 RX ADMIN — LEVOTHYROXINE SODIUM 125 MCG: 0.12 TABLET ORAL at 05:21

## 2021-09-28 RX ADMIN — CIPROFLOXACIN 400 MG: 2 INJECTION, SOLUTION INTRAVENOUS at 08:30

## 2021-09-28 RX ADMIN — CARVEDILOL 6.25 MG: 6.25 TABLET, FILM COATED ORAL at 08:30

## 2021-09-28 RX ADMIN — ACETAMINOPHEN 650 MG: 325 TABLET ORAL at 03:05

## 2021-09-28 RX ADMIN — Medication 5 ML: at 05:21

## 2021-09-28 NOTE — DISCHARGE SUMMARY
[address], [address] [facility]  [phone]   Discharge Summary     Armando Butterfield  MRN: [id number]     : 1945     Age: 66 y.o. Admit date: 2021     Discharge date: 2021  Attending Physician: Bere Pereyra MD  Primary Discharge Diagnosis:   Active Problems:    Bladder injury with open wound into cavity (2021)      Primary Operations or Procedures Performed :  * No surgery found *     Brief History and Reason for Admission: Armando Butterfield was admitted with the following history of present illness. This is a 64-year-old male with complex medical history of rectal cancer and colon resection in the past with recent completion proctectomy with perineal approach and repair of cystotomy with plastic surgery closure of the perineum who presents complaining of urine leakage from his perineal incision. He has bilateral nephrostomy tubes and Bishop catheter which was replaced yesterday. He states that he began draining clear urine consistency leakage from his perineal wound. He states that every time he stands up the wound leaks what seems to be urine. There was a known bladder injury during his most recent surgery on 2021 with bladder repair. He denies any pain. He denies any associated fevers nausea or vomiting. He presented to the emergency department for evaluation of bladder leak through the perineum and surgery is consulted for admission. Lab work and CT abdomen pelvis are pending. Dr. Harrell Client of urology is aware.        Hospital Course: The patient underwent  on * No surgery found *.     21 Awake in bed, still c/o leakage from his perineal wound. Bishop repositioned and bilateral NT draining urine appropriately. 21 Up in chair, no further leakage from his perineal wound.     The patient progressed satisfactorily meeting milestones necessary for successful discharge including tolerating a diet, adequate mobility, adequate pain control, and active bowel function. Patient was deemed a good candidate for discharge at the time of morning rounding. They are to follow up as indicated in their provided discharge paperwork. The patient helped develop and voices understanding with the plan of care. They are amenable without reservations at this time to moving forward with discharge. Condition at Discharge: Good    Discharge Medications:   Current Discharge Medication List      CONTINUE these medications which have CHANGED    Details   clonazePAM (KlonoPIN) 2 mg tablet Take 2 Tablets by mouth nightly. Max Daily Amount: 4 mg. Indications: takes for sleep  Qty: 15 Tablet, Refills: 0    Associated Diagnoses: Primary insomnia         CONTINUE these medications which have NOT CHANGED    Details   ascorbic acid, vitamin C, (VITAMIN C) 500 mg tablet Take 500 mg by mouth two (2) times a day. cyanocobalamin (VITAMIN B12) 500 mcg tablet Take 500 mcg by mouth two (2) times a day. carvediloL (COREG) 3.125 mg tablet Take 2 Tablets by mouth two (2) times daily (with meals) for 30 days. Indications: high blood pressure  Qty: 120 Tablet, Refills: 0      levothyroxine (SYNTHROID) 125 mcg tablet TAKE ONE TABLET BY MOUTH ONE TIME DAILY BEFORE BREAKFAST  Qty: 90 Tab, Refills: 3    Associated Diagnoses: Acquired hypothyroidism      amitriptyline (ELAVIL) 100 mg tablet Take 1 Tab by mouth nightly. Dr Jordan Huggins psych  Indications: takes for sleep  Qty: 15 Tab, Refills: 0      OLANZapine (ZYPREXA) 2.5 mg tablet Take 7.5 mg by mouth nightly. Indications: additional medications to treat depression, Takes 3 x 2.5 mg tabs      Cholecalciferol, Vitamin D3, (VITAMIN D3) 2,000 unit cap capsule Take 2,000 Units by mouth two (2) times a day. Qty: 180 Cap, Refills: 3      Lactobacillus Acidoph & Bulgar (Floranex) 1 million cell tab tablet Take 2 Tablets by mouth two (2) times a day for 15 days.   Qty: 60 Tablet, Refills: 0 ferrous sulfate (IRON) 325 mg (65 mg iron) EC tablet Take 1 Tablet by mouth Daily (before breakfast) for 30 days. Qty: 30 Tablet, Refills: 0      acetaminophen (TYLENOL) 500 mg tablet Take 500 mg by mouth every six (6) hours as needed for Pain. STOP taking these medications       ciprofloxacin HCl (CIPRO) 750 mg tablet Comments:   Reason for Stopping:         potassium chloride (KLOR-CON) 10 mEq tablet Comments:   Reason for Stopping:                 Disposition: SNF    Discharge Instructions/Follow-up Care:       Follow up with Dr. Jo Ann Santamaria in 2 weeks  Follow-up with Dr. Randolph Mancini as scheduled  Follow-up with Dr. Berlin Lam as scheduled  Diet as tolerated  Bilateral nephrostomy tube care  Ostomy care  Bishop care  Activity as tolerated  Keep perineal wound clean and dry  Ok to shower    Signed:  Sue Ramirez NP   9/28/2021  12:29 PM

## 2021-09-28 NOTE — DISCHARGE INSTRUCTIONS
Follow up with Dr. Ancelmo Yancey in 2 weeks  Follow-up with Dr. William Bunn as scheduled  Follow-up with Dr. Arnav Canales as scheduled  Diet as tolerated  Bilateral nephrostomy tube care  Ostomy care  Bishop care  Activity as tolerated  Keep perineal wound clean and dry  Ok to shower

## 2021-09-28 NOTE — PROGRESS NOTES
ACUTE OT GOALS:  (Developed with and agreed upon by patient and/or caregiver.)  1. Patient will complete lower body bathing and dressing in standing with Supervision and adaptive equipment as needed. 2. Patient will complete toileting with Supervision and use of adaptive equipment as needed. 3. Patient will tolerate 23 minutes of OT treatment with 1-2 rest breaks to increase activity tolerance for ADLs. 4. Patient will complete BUE exercises to increase strength in BUE for performance of ADLs and functional transfers. 5. Patient will complete functional transfers with Supervision and adaptive equipment as needed. 6. Patient will complete standing grooming ADL with Supervision and good dynamic standing balance.     Timeframe: 7 visits     OCCUPATIONAL THERAPY: Daily Note OT Treatment Day # 2    Kyleigh Rodriguez is a 66 y.o. male   PRIMARY DIAGNOSIS: <principal problem not specified>  Bladder injury with open wound into cavity [S37.20XA]       [place] / Product Type: Medicare /   ASSESSMENT:     REHAB RECOMMENDATIONS: CURRENT LEVEL OF FUNCTION:  (Most Recently Demonstrated)   Recommendation to date pending progress:  Setting:   Short-term Rehab  Equipment:    To Be Determined Bathing:   Standby Assistance  Dressing:   Supervision  Feeding/Grooming:   Supervision  Toileting:   Not tested  Functional Mobility:   Not tested     ASSESSMENT:  Mr. Kiki Godoy is doing well today. Pt very pleasant. Pt able to perform ADLs at sink with mostly SBA. Pt demonstrates good sitting balance and fair standing balance during pericare. Pt able to perform LB dressing with supervision. Pt left in chair with all needs in reach. Good session for patient today. Good progress with goals. Will continue to benefit from skilled OT during stay.      SUBJECTIVE:   Mr. Kiki Godoy states, \"I had this bag about 6 years\"    SOCIAL HISTORY/LIVING ENVIRONMENT:   Support Systems: Spouse/Significant Other, Caregiver/Home Care Staff    OBJECTIVE:     PAIN: VITAL SIGNS: LINES/DRAINS:   Pre Treatment: Pain Screen  Pain Scale 1: Numeric (0 - 10)  Pain Intensity 1: 0  Post Treatment: 0   N/a  O2 Device: None (Room air)     ACTIVITIES OF DAILY LIVING: I Mod I S SBA CGA Min Mod Max Total NT Comments   BASIC ADLs:              Bathing/ Showering [] [] [] [x] [] [] [] [] [] []    Toileting [] [] [] [] [] [] [] [] [] [x]    Dressing [] [] [x] [] [] [] [] [] [] []    Feeding [] [] [] [] [] [] [] [] [] [x]    Grooming [] [] [x] [] [] [] [] [] [] []    Personal Device Care [] [] [] [] [] [] [] [] [] [x]    Functional Mobility [] [] [] [x] [] [] [] [] [] [] Sit to stand transfer   I=Independent, Mod I=Modified Independent, S=Supervision, SBA=Standby Assistance, CGA=Contact Guard Assistance,   Min=Minimal Assistance, Mod=Moderate Assistance, Max=Maximal Assistance, Total=Total Assistance, NT=Not Tested    MOBILITY: I Mod I S SBA CGA Min Mod Max Total  NT x2 Comments:   Supine to sit [] [] [] [] [] [] [] [] [] [x] []    Sit to supine [] [] [] [] [] [] [] [] [] [x] []    Sit to stand [] [] [] [x] [] [] [] [] [] [] []    Bed to chair [] [] [] [] [] [] [] [] [] [x] []    I=Independent, Mod I=Modified Independent, S=Supervision, SBA=Standby Assistance, CGA=Contact Guard Assistance,   Min=Minimal Assistance, Mod=Moderate Assistance, Max=Maximal Assistance, Total=Total Assistance, NT=Not Tested    BALANCE: Good Fair+ Fair Fair- Poor NT Comments   Sitting Static [] [x] [] [] [] []    Sitting Dynamic [] [x] [] [] [] []              Standing Static [] [] [x] [] [] []    Standing Dynamic [] [] [x] [] [] []      PLAN:   FREQUENCY/DURATION: OT Plan of Care: 3 times/week for duration of hospital stay or until stated goals are met, whichever comes first.    TREATMENT:   TREATMENT:   ($$ Self Care/Home Management: 23-37 mins$$ Therapeutic Activity: 8-22 mins   )  Therapeutic Activity (10 Minutes):  Therapeutic activity included Sitting balance  and Standing balance to improve functional Mobility, Strength and Activity tolerance. Self Care (30 Minutes): Self care including Upper Body Bathing, Lower Body Bathing, Upper Body Dressing, Lower Body Dressing and Grooming to increase independence and decrease level of assistance required.     TREATMENT GRID:  N/A    AFTER TREATMENT POSITION/PRECAUTIONS:  Chair and Needs within reach    INTERDISCIPLINARY COLLABORATION:  RN/PCT and OT/TURK    TOTAL TREATMENT DURATION:  OT Patient Time In/Time Out  Time In: 1035  Time Out: 1115    Deep Valencia

## 2021-09-28 NOTE — PROGRESS NOTES
ACUTE PHYSICAL THERAPY GOALS:  (Developed with and agreed upon by patient and/or caregiver.)  (1.) Lluvia Gu will move from supine to sit and sit to supine , scoot up and down and roll side to side with MODIFIED INDEPENDENCE within 7 treatment day(s). (2.) Lluvia Gu will transfer from bed to chair and chair to bed with SUPERVISION using the least restrictive device within 7 treatment day(s). (3.) Onur Marroquin will ambulate with SUPERVISION for 300+ feet with the least restrictive device within 7 treatment day(s). (4.) Lluvia Gu will perform standing static and dynamic balance activities x 25 minutes with STAND BY ASSIST to improve safety within 7 treatment day(s). (5.) Lluvia Gu will perform therapeutic exercises x 20 min for HEP with SUPERVISION to improve strength, endurance, and functional mobility within 7 treatment day(s). PHYSICAL THERAPY: Daily Note and AM Treatment Day # 2    Lluvia Gu is a 66 y.o. male   PRIMARY DIAGNOSIS: <principal problem not specified>  Bladder injury with open wound into cavity [S37.20XA]         ASSESSMENT:     REHAB RECOMMENDATIONS: CURRENT LEVEL OF FUNCTION:  (Most Recently Demonstrated)   Recommendation to date pending progress:  Setting:   Short-term Rehab  Equipment:    To Be Determined Bed Mobility:   Not tested  Sit to Stand:   Standby Assistance  Transfers:   Standby Assistance  Gait/Mobility:   Contact Guard Assistance     ASSESSMENT:  Mr. Ivania Busby is making good progress towards PT goals as noted by increased gait distance, activity tolerance, standing balance tolerance, and mobility. Will continue efforts. SUBJECTIVE:   Mr. Ivania Busby states, \"I live alone. \"    SOCIAL HISTORY/ LIVING ENVIRONMENT: See initial evaluation  Support Systems: Spouse/Significant Other, Caregiver/Home Care Staff  OBJECTIVE:     PAIN: VITAL SIGNS: LINES/DRAINS:   Pre Treatment: Pain Screen  Pain Scale 1: FLACC  Pain Intensity 1: 0  Post Treatment: 0   Colostomy, Bishop Catheter and 2 Drains  O2 Device: None (Room air)     MOBILITY: I Mod I S SBA CGA Min Mod Max Total  NT x2 Comments:   Bed Mobility    Rolling [] [] [] [] [] [] [] [] [] [] []    Supine to Sit [] [] [] [] [] [] [] [] [] [] []    Scooting [] [] [] [] [] [] [] [] [] [] []    Sit to Supine [] [] [] [] [] [] [] [] [] [] []    Transfers    Sit to Stand [] [] [] [x] [] [] [] [] [] [] []    Bed to Chair [] [] [] [] [] [] [] [] [] [] []    Stand to Sit [] [] [] [x] [] [] [] [] [] [] []    I=Independent, Mod I=Modified Independent, S=Supervision, SBA=Standby Assistance, CGA=Contact Guard Assistance,   Min=Minimal Assistance, Mod=Moderate Assistance, Max=Maximal Assistance, Total=Total Assistance, NT=Not Tested    BALANCE: Good Fair+ Fair Fair- Poor NT Comments   Sitting Static [x] [] [] [] [] []    Sitting Dynamic [x] [] [] [] [] []              Standing Static [x] [] [] [] [] []    Standing Dynamic [] [x] [] [] [] []      GAIT: I Mod I S SBA CGA Min Mod Max Total  NT x2 Comments:   Level of Assistance [] [] [] [] [x] [] [] [] [] [] []    Distance 900'    DME N/A    Gait Quality     Weightbearing  Status N/A     I=Independent, Mod I=Modified Independent, S=Supervision, SBA=Standby Assistance, CGA=Contact Guard Assistance,   Min=Minimal Assistance, Mod=Moderate Assistance, Max=Maximal Assistance, Total=Total Assistance, NT=Not Tested    PLAN:   FREQUENCY/DURATION: PT Plan of Care: 3 times/week for duration of hospital stay or until stated goals are met, whichever comes first.  TREATMENT:     TREATMENT:   ($$ Therapeutic Activity: 23-37 mins    )  Therapeutic Activity (24 Minutes): Therapeutic activity included Transfer Training, Ambulation on level ground and Standing balance to improve functional Mobility, Strength and Activity tolerance.     TREATMENT GRID:  N/A    AFTER TREATMENT POSITION/PRECAUTIONS:  Alarm Activated, Chair, Needs within reach and RN notified    INTERDISCIPLINARY COLLABORATION:  RN/PCT and PT/PTA    TOTAL TREATMENT DURATION:  PT Patient Time In/Time Out  Time In: 1006  Time Out: [address] [facility]

## 2021-09-28 NOTE — PROGRESS NOTES
TRANSFER - OUT REPORT:    Verbal report given to Thomas Capellan on Daljit Park  being transferred to [facility] for routine progression of care       Report consisted of patients Situation, Background, Assessment and   Recommendations(SBAR). Information from the following report(s) SBAR was reviewed with the receiving nurse. Opportunity for questions and clarification was provided.       Patient transported with:   Kathe Iraheta

## 2021-09-28 NOTE — PROGRESS NOTES
Problem: Falls - Risk of  Goal: *Absence of Falls  Description: Document Jc Chopra Fall Risk and appropriate interventions in the flowsheet.   Outcome: Progressing Towards Goal  Note: Fall Risk Interventions:  Mobility Interventions: OT consult for ADLs, Patient to call before getting OOB, PT Consult for mobility concerns         Medication Interventions: Patient to call before getting OOB, Teach patient to arise slowly    Elimination Interventions: Call light in reach, Patient to call for help with toileting needs, Toileting schedule/hourly rounds              Problem: Patient Education: Go to Patient Education Activity  Goal: Patient/Family Education  Outcome: Progressing Towards Goal     Problem: Patient Education: Go to Patient Education Activity  Goal: Patient/Family Education  Outcome: Progressing Towards Goal

## 2021-09-28 NOTE — PROGRESS NOTES
Pt was medically cleared for dc today and transferred to [facility] for STR services. Transport provided by [employer]. Pt notified his family. Care Management Interventions  PCP Verified by CM: Yes  Last Visit to PCP: 08/03/21  Mode of Transport at Discharge: [address]  [address] Time of Discharge: [address] (CM Consult): SNF  Partner SNF: Yes  Discharge Durable Medical Equipment: No  Physical Therapy Consult: Yes  Occupational Therapy Consult: Yes  Speech Therapy Consult: No  Support Systems: Spouse/Significant Other, Caregiver/Home Care Staff  Confirm Follow Up Transport: Family  The Plan for Transition of Care is Related to the Following Treatment Goals : STR to improve pt's strength and functional abilities for a safe transition to home.   The Patient and/or Patient Representative was Provided with a Choice of Provider and Agrees with the Discharge Plan?: Yes  Discharge Location  Discharge Placement: Rehab Unit Subacute ([facility])

## 2021-09-28 NOTE — PROGRESS NOTES
Brenda Marroquin    9/28/2021    Date of Admission:  9/26/2021      Subjective: This is a 38-year-old male with complex medical history of rectal cancer and colon resection in the past with recent completion proctectomy with perineal approach and repair of cystotomy with plastic surgery closure of the perineum who presents complaining of urine leakage from his perineal incision. He has bilateral nephrostomy tubes and Bishop catheter which was replaced yesterday. He states that he began draining clear urine consistency leakage from his perineal wound. He states that every time he stands up the wound leaks what seems to be urine. There was a known bladder injury during his most recent surgery on 8/27/2021 with bladder repair. He denies any pain. He denies any associated fevers nausea or vomiting. He presented to the emergency department for evaluation of bladder leak through the perineum and surgery is consulted for admission. Lab work and CT abdomen pelvis are pending. Dr. Johny Aldana of urology is aware. 9/27/21 Awake in bed, still c/o leakage from his perineal wound. Bishop repositioned and bilateral NT draining urine appropriately. 9/28/21 Up in chair, no further leakage from his perineal wound.     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    Pelvic abscess in male [facility]) 05/13/2021    Anxiety 05/11/2021    UTI (urinary tract infection) 05/11/2021    Bilateral hydronephrosis 05/11/2021    Dilated intrahepatic bile duct 05/11/2021    Pancreatic duct dilated 05/11/2021    Major depression     Hypothyroid 06/14/2019    Fever 06/13/2019    Acute UTI (urinary tract infection) 06/13/2019    Anemia 06/13/2019    Elevated LFTs 06/13/2019    Acute blood loss anemia 05/21/2019    Acute deep vein thrombosis (DVT) of non-extremity vein 05/20/2019    DVT (deep vein thrombosis) in pregnancy 05/20/2019    Severe protein-calorie malnutrition ([address] 05/19/2019    On total parenteral nutrition (TPN) 05/19/2019    Colostomy care ([address] 05/19/2019    Chronic indwelling Bishop catheter 05/19/2019    Moderate protein-calorie malnutrition ([address] 05/19/2019    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    Small bowel obstruction (HCC) 03/30/2019    SBO (small bowel obstruction) (HCC) 03/22/2019    Infection of implant 03/30/2017    Abdominal wall abscess 05/22/2016    Infected prosthetic mesh of abdominal wall (HCC) 05/22/2016    Abscess 05/22/2016    Fecal incontinence 05/11/2016    Bowel incontinence 05/11/2016    Weight loss 11/19/2015    Hypercalcemia of malignancy 11/19/2015    Hypokalemia 08/29/2014    Hypomagnesemia 08/29/2014    Pleural effusion 08/29/2014    Hypernatremia 08/29/2014    Severe sepsis with acute organ dysfunction (HCC) 08/24/2014    Hypoxemia 08/24/2014    Peritonitis (acute) generalized 08/24/2014    Attention to ileostomy ([address] 08/20/2014    Dehydration 04/17/2014    Ileus ([address] 03/20/2014    Postoperative ileus ([address] 02/07/2014    Constipation 10/29/2013    Rectal cancer ([address] 10/22/2013     Past Medical History:   Diagnosis Date    Acute deep vein thrombosis (DVT) of non-extremity vein 5/20/2019    Family history of malignant neoplasm of gastrointestinal tract     mother colon/ovarian cancer 67    Fecal incontinence     LAR syndrome    Former cigarette smoker     History of rectal cancer     Hypothyroid     stable w/med    Infection and inflammatory reaction due to other internal prosthetic devices, implants and grafts, subsequent encounter 2017    abd wound/mesh colostomy    Insomnia     takes meds    Major depression     Osteoarthritis, hand     Personal history of colonic polyps 9/2013    x 1  Personal history of malignant neoplasm of rectum, rectosigmoid junction, and anus 9/2013    Psychiatric disorder     anxiety      Past Surgical History:   Procedure Laterality Date    COLONOSCOPY N/A 2/12/2018    COLONOSCOPY / BMI=21 performed by Daniel Garcia MD at [facility] N/A 3/5/2021    COLONOSCOPY/BMI 19 performed by Geovanni Sykes MD at [address]  2/12/2018         COLONOSCOPY THRU STOMA,LESN RMVL W/SNARE  3/5/2021         ENDOSCOPY, COLON, DIAGNOSTIC  last 2/3/15    Emily--no polyps--3 year recall    HX COLOSTOMY  5/11/16    HX GI  4/2016    Sacral nerve stimlator lead trial (unsucessful) and removal    HX HERNIA REPAIR  3/2015, 5/2016    HX HERNIA REPAIR      and Colostomy in May    HX OTHER SURGICAL  10/7/2013    Emily---endorectal us    HX OTHER SURGICAL Bilateral     cataracts  2017    HX POLYPECTOMY      HX TOTAL COLECTOMY  11/2013    Emily--lap LAR with coloproctostomy, mobilization splenic flexure, diverting loop ileostomy    HX TOTAL COLECTOMY Right 8/2014    for leak    HX VASCULAR ACCESS Left 4/14    single lumen port/subsequently removed    IR CHANGE ABSCESS / CYST CATHETER  5/27/2021    IR CHANGE ABSCESS / CYST CATHETER  6/14/2021    IR CHANGE ABSCESS / CYST CATHETER  7/8/2021    IR CHANGE ABSCESS / CYST CATHETER  8/5/2021    IR INJ ABS CYST VIA CATHETER / TUBE  5/20/2021    IR INJ ABS CYST VIA CATHETER / TUBE  6/4/2021    IR INSERT TUNL CVC W/O PORT OVER 5 YR  5/20/2019    IR NEPHROSTOMY PERC LT PLC CATH  SI  9/16/2021    IR REPLACE CVC TUNNELED W/O PORT  5/30/2019      Prior to Admission Medications   Prescriptions Last Dose Informant Patient Reported? Taking? Cholecalciferol, Vitamin D3, (VITAMIN D3) 2,000 unit cap capsule   No No   Sig: Take 2,000 Units by mouth two (2) times a day.    Lactobacillus Acidoph & Bulgar (Floranex) 1 million cell tab tablet   No No   Sig: Take 2 Tablets by mouth two (2) times a day for 15 days. OLANZapine (ZYPREXA) 2.5 mg tablet   Yes No   Sig: Take 7.5 mg by mouth nightly. Indications: additional medications to treat depression, Takes 3 x 2.5 mg tabs   acetaminophen (TYLENOL) 500 mg tablet   Yes No   Sig: Take 500 mg by mouth every six (6) hours as needed for Pain. amitriptyline (ELAVIL) 100 mg tablet   No No   Sig: Take 1 Tab by mouth nightly. Dr Mabel Estrada psych  Indications: takes for sleep   ascorbic acid, vitamin C, (VITAMIN C) 500 mg tablet   Yes No   Sig: Take 500 mg by mouth daily. carvediloL (COREG) 3.125 mg tablet   No No   Sig: Take 2 Tablets by mouth two (2) times daily (with meals) for 30 days. Indications: high blood pressure   ciprofloxacin HCl (CIPRO) 750 mg tablet   No No   Sig: Take 1 Tablet by mouth two (2) times a day for 14 days. Indications: infection of urinary tract due to Pseudomonas aeruginosa   clonazePAM (KLONOPIN) 2 mg tablet   No No   Sig: Take 2 Tabs by mouth nightly. Max Daily Amount: 4 mg. Indications: takes for sleep   Patient taking differently: Take 6 mg by mouth nightly. Indications: takes for sleep   cyanocobalamin (VITAMIN B12) 500 mcg tablet   Yes No   Sig: Take 500 mcg by mouth daily. ferrous sulfate (IRON) 325 mg (65 mg iron) EC tablet   No No   Sig: Take 1 Tablet by mouth Daily (before breakfast) for 30 days. levothyroxine (SYNTHROID) 125 mcg tablet   No No   Sig: TAKE ONE TABLET BY MOUTH ONE TIME DAILY BEFORE BREAKFAST   potassium chloride (KLOR-CON) 10 mEq tablet   No No   Sig: Take 1 Tablet by mouth daily for 7 days.       Facility-Administered Medications: None     No Known Allergies   Social History     Tobacco Use    Smoking status: Former Smoker     Packs/day: 1.00     Years: 4.00     Pack years: 4.00     Types: Cigarettes     Quit date: 1963     Years since quittin.9    Smokeless tobacco: Never Used   Substance Use Topics    Alcohol use: Not Currently     Alcohol/week: 11.7 standard drinks     Types: 14 Cans of beer per week     Comment: advised stop drinking given issues      Social History     Social History Narrative    Not on file     Family History   Problem Relation Age of Onset    Cancer Mother         colon and ovarian    Cancer Brother         prostate    Alcohol abuse Brother     Cancer Maternal Grandmother         bone    Alcohol abuse Father     Alcohol abuse Sister     Stroke Paternal Grandfather         Current Facility-Administered Medications   Medication Dose Route Frequency    amitriptyline (ELAVIL) tablet 100 mg  100 mg Oral QHS    carvediloL (COREG) tablet 6.25 mg  6.25 mg Oral BID WITH MEALS    clonazePAM (KlonoPIN) tablet 6 mg  6 mg Oral QHS    levothyroxine (SYNTHROID) tablet 125 mcg  125 mcg Oral ACB    OLANZapine (ZyPREXA) tablet 7.5 mg  7.5 mg Oral QHS    sodium chloride (NS) flush 5-40 mL  5-40 mL IntraVENous Q8H    sodium chloride (NS) flush 5-40 mL  5-40 mL IntraVENous PRN    acetaminophen (TYLENOL) tablet 650 mg  650 mg Oral Q6H PRN    Or    acetaminophen (TYLENOL) suppository 650 mg  650 mg Rectal Q6H PRN    polyethylene glycol (MIRALAX) packet 17 g  17 g Oral DAILY PRN    ondansetron (ZOFRAN ODT) tablet 4 mg  4 mg Oral Q8H PRN    Or    ondansetron (ZOFRAN) injection 4 mg  4 mg IntraVENous Q6H PRN    enoxaparin (LOVENOX) injection 40 mg  40 mg SubCUTAneous Q24H    ciprofloxacin (CIPRO) 400 mg in D5W IVPB (premix)  400 mg IntraVENous Q12H       Review of Systems  A comprehensive review of systems was negative except for that written in the HPI.     Objective:     Vitals:    09/27/21 2351 09/28/21 0355 09/28/21 0405 09/28/21 0754   BP: 107/61 (!) 86/48 (!) 93/52 (!) 106/52   Pulse: 61 (!) 53  72   Resp: 20 21  18   Temp: 97.5 °F (36.4 °C) 97.7 °F (36.5 °C)  97.4 °F (36.3 °C)   SpO2: 96% 96%  97%     PHYSICAL EXAM   Physical Examination: General appearance - alert, well appearing, and in no distress  Mental status - alert, oriented to person, place, and time  Eyes - pupils equal and reactive, extraocular eye movements intact  Nose - normal and patent, no erythema, discharge or polyps  Neck - supple, no significant adenopathy  Chest - clear to auscultation, no wheezes, rales or rhonchi, symmetric air entry  Heart - normal rate, regular rhythm, normal S1, S2, no murmurs, rubs, clicks or gallops  Abdomen - soft, nontender, nondistended, no masses or organomegaly  Neurological - alert, oriented, normal speech, no focal findings or movement disorder noted  : bilat neph tubes and brito  Musculoskeletal - no joint tenderness, deformity or swelling  Extremities - peripheral pulses normal, no pedal edema, no clubbing or cyanosis  Skin - normal coloration and turgor, no rashes, no suspicious skin lesions noted  Perineal wound is open but clean. There is no cellulitis. No necrosis or skin sloughing. There is a small open area but no visible bowel or wound complications otherwise. Recent Labs     09/26/21  1535   WBC 6.6   HGB 8.4*   HCT 27.8*   *     Recent Labs     09/26/21  1535      K 4.1      GLU 88   CO2 30   BUN 25*   CREA 1.29     No results for input(s): PH, PCO2, PO2, HCO3 in the last 72 hours.     Assessment:     Hospital Problems  Date Reviewed: 9/10/2021        Codes Class Noted POA    Bladder injury with open wound into cavity ICD-10-CM: S37.20XA  ICD-9-CM: 867.1  9/26/2021 Unknown            Plan:   Continue present care  NT and brito adequately draining urine  Stable for DC to SNF when bed available--possibly today    Antonieta Wheeler NP

## 2021-09-28 NOTE — PROGRESS NOTES
Urology Progress Note    Admit Date: 9/26/2021    Subjective:     Patient has no new complaints this AM.  Feels better since brito catheter repositioning. Afebrile. No pain. Tubes draining clear yellow urine. Draining from perineal incisions has completely stopped. Objective:     Patient Vitals for the past 8 hrs:   BP Temp Pulse Resp SpO2   09/28/21 0405 (!) 93/52       09/28/21 0355 (!) 86/48 97.7 °F (36.5 °C) (!) 53 21 96 %   09/27/21 2351 107/61 97.5 °F (36.4 °C) 61 20 96 %     No intake/output data recorded. 09/26 1901 - 09/28 0700  In: 600 [P.O.:600]  Out: 4180 [Urine:4180]    Physical Exam:     Visit Vitals  BP (!) 93/52   Pulse (!) 53   Temp 97.7 °F (36.5 °C)   Resp 21   SpO2 96%      R NT: 825 cc  L NT: 925 cc  Brito: 650 cc     GENERAL: No acute distress, Awake, Alert, Oriented X 3, Sitting in bed, eating breakfast  CARDIAC: regular rate and rhythm  CHEST AND LUNG: Easy work of breathing  ABDOMEN: soft, non tender, non-distended  BACK: NTs draining clear yellow urine  : Brito catheter draining clear yellow urine. Data Review   Recent Results (from the past 24 hour(s))   SARS-COV-2    Collection Time: 09/27/21 12:33 PM   Result Value Ref Range    SARS-CoV-2 Please find results under separate order     SARS-COV-2, PCR    Collection Time: 09/27/21 12:33 PM    Specimen: Nasopharyngeal   Result Value Ref Range    Specimen source Nasopharyngeal      SARS-CoV-2 Not detected NOTD             Assessment:     Active Problems:    Bladder injury with open wound into cavity (9/26/2021)           66 y. o. male with a past medical hx of kidney stones, pelvic abscess and urinary retention who had a completion proctectomy by Dr. Amelie Blakely.  A cystotomy occurred during surgery and was repaired intra-operatively by Dr. Amelie Blakely. He presented for follow-up on 9/10 with cystogram performed in office showing no bladder injury with complete healing of repair and L grade 3 vesico-ureteral reflux.  Presented to ER 9/15 with REBEKAH/UTI (urine culture- pseudomonas). He was txt for UTI. CT cystogram showed extravasation  through a large defect within the posterior bladder wall, fills the presacral space and approaches an apparent perianal fistulous tract.      SP bilateral NT placement 9/16. He d/c home with bilateral NT/brito catheter with plans to complete UTI txt and RTO for cystoscopy to assess for bladder healing. Returns with c/o leaking from perineal wound. CT showed brito to be in urethra. New brito reinserted and now appears to be in correct place. Now doing better and perineal drainage has resolved with brito catheter repositioning. CT this admission showed redemonstrated dorsal bladder rupture being managed with max drainage of urinary tract with NTs and brtio catheter. Pt is comfortable at present time with VSS. All tubes draining well. On IV cipro.       Plan:     -Continue all tubes to drainage. Main problem was that urethral brito became lodged in urethra and was not draining appropriately. Now, he is doing much better after repositioning of catheter. Currently stable for discharge and SNF placement pending. We had a very long discussion today about the long term plan. Given his recent surgery in 1/3263, complicated by urine leak, we are trying to allow for his abdomen to \"cool off\" and heal with max conservative urinary diversion (brito and NTs). He has been doing well with this, until brito migrated into urethra, resulting in inadequate bladder drainage and therefore leakage from his perineal wound. This has now resolved with brito repositioning. If the bladder fails to heal with max drainage of urinary tract, the next likely step would be exploratory laparotomy with attempted palliative urinary diversion to urostomy, as attempts at bladder repair have already been unsuccessful and further attempts at repair would likely also fail.   Given his past medical history, palliative urinary diversion would be fraught with difficulty and possibly not even feasible if the bowels/ureters cannot be adequately mobilized or are in poor condition from his prior radiation. If palliative diversion could not be completed, then a last resort would be to tie off the bilateral ureters, leaving him with permanent bilateral nephrostomy tubes to allow for urine drainage. He expressed understanding of the above plan. -He will keep his follow up with me as scheduled as an outpatient and keep all tubes until then. Jack Sanchez M.D.     [facility]  [address] [place] [address], [facility] [address]  Phone: [phone]  Fax: [phone]

## 2021-09-28 NOTE — CDMP QUERY
Type of Anemia    Please provide further specificity, if known.      - Anemia due to acute blood loss   - Anemia due to chronic blood loss   - Anemia due to iron deficiency   - Anemia due to postoperative blood loss   - Anemia due to chronic disease    Thank you,  CDI Team/Clinical Documentation Improvement  [phone]

## 2021-09-29 ENCOUNTER — HOME CARE VISIT (OUTPATIENT)
Dept: HOME HEALTH SERVICES | Facility: HOME HEALTH | Age: 76
End: 2021-09-29
Payer: MEDICARE

## 2021-09-29 PROCEDURE — 3331090001 HH PPS REVENUE CREDIT

## 2021-09-29 PROCEDURE — 3331090002 HH PPS REVENUE DEBIT

## 2021-09-29 NOTE — CASE COMMUNICATION
Patient discharged [facility] yesterday and was admitted to [facility]. Discharge home care services.

## 2021-10-04 ENCOUNTER — HOSPITAL ENCOUNTER (OUTPATIENT)
Dept: INTERVENTIONAL RADIOLOGY/VASCULAR | Age: 76
Discharge: HOME | End: 2021-10-04
Attending: RADIOLOGY
Payer: MEDICARE

## 2021-10-04 VITALS
SYSTOLIC BLOOD PRESSURE: 156 MMHG | HEART RATE: 64 BPM | RESPIRATION RATE: 18 BRPM | TEMPERATURE: 98.2 F | OXYGEN SATURATION: 97 % | DIASTOLIC BLOOD PRESSURE: 72 MMHG

## 2021-10-04 PROCEDURE — C1769 GUIDE WIRE: HCPCS

## 2021-10-04 PROCEDURE — 77030025702 HC BG URIN DRN MRTM -A

## 2021-10-04 PROCEDURE — 50431 NJX PX NFROSGRM &/URTRGRM: CPT

## 2021-10-04 PROCEDURE — 74011000636 HC RX REV CODE- 636: Performed by: RADIOLOGY

## 2021-10-04 RX ADMIN — IOPAMIDOL 30 ML: 612 INJECTION, SOLUTION INTRAVENOUS at 14:23

## 2021-10-04 NOTE — DISCHARGE INSTRUCTIONS
[address]  Department of Interventional Radiology  [facility]  [phone] Office  [phone] Fax    Nephrostomy Tube Discharge Instructions    General Information:   A nephrostomy is a tube inserted through your skin and into the kidney. The purpose of the tube is to drain urine outside of the body. This is done in the event fo a block or a damaged ureter. Most of these tubes are usually changed every 2-3 months. However, some patients may need to have their tubes changed more often. Home Care Instructions: You can resume your regular diet. Do not drink alcohol, drive, or make any important legal decisions in the next 24 hours. Do not lift anything heavier than a gallon of milk or do anything strenuous for the next 24 hours. You should not shower for 24 hours. You should not bathe or swim while you have this drain in place. Your doctor may arrange for home health to visit you to help take care of the tube. You should clean the tube and change the dressing at least every 48 hours and more as needed. Keep the dressing clean and dry. Keep up with how much drainage you get from the tube and report this to your doctor. Call If:   You should call your Physician and/or the Radiology Nurse if you have any bleeding other than a small spot on your bandage. Call if you have any signs of infection, fever, or increased pain at the site of the tube. Call if you should have leakage around the tube, a change in the appearance of the urine draining from the tube, increased pain in the lower back, or no drainage from the tube for 12 hours. Call if the tube has pulled back, or has been pulled out. Follow-Up Instructions:  Please see your ordering doctor as he/she has requested. To Reach Us: If you have any questions about your procedure, please call the Interventional Radiology department at [phone].  After business hours (5pm) and weekends, call the answering service at [phone] and ask for the Radiologist on call to be paged. Si tiene Preguntas acerca del procedimiento, por favor llame al departamento de Radiología Intervencional al [phone]. Después de horas de oficina (5 pm) y los fines de [place], llamar al Miki Glover Nydia al [phone] y pregunte por el Radiologo de Tamra Otero. Interventional Radiology General Nurse Discharge    After general anesthesia or intravenous sedation, for 24 hours or while taking prescription Narcotics:  · Limit your activities  · Do not drive and operate hazardous machinery  · Do not make important personal or business decisions  · Do  not drink alcoholic beverages  · If you have not urinated within 8 hours after discharge, please contact your surgeon on call. * Please give a list of your current medications to your Primary Care Provider. * Please update this list whenever your medications are discontinued, doses are     changed, or new medications (including over-the-counter products) are added. * Please carry medication information at all times in case of emergency situations. These are general instructions for a healthy lifestyle:    No smoking/ No tobacco products/ Avoid exposure to second hand smoke  Surgeon General's Warning:  Quitting smoking now greatly reduces serious risk to your health. Obesity, smoking, and sedentary lifestyle greatly increases your risk for illness  A healthy diet, regular physical exercise & weight monitoring are important for maintaining a healthy lifestyle    You may be retaining fluid if you have a history of heart failure or if you experience any of the following symptoms:  Weight gain of 3 pounds or more overnight or 5 pounds in a week, increased swelling in our hands or feet or shortness of breath while lying flat in bed.   Please call your doctor as soon as you notice any of these symptoms; do not wait until your next office visit. Recognize signs and symptoms of STROKE:  F-face looks uneven    A-arms unable to move or move unevenly    S-speech slurred or non-existent    T-time-call 911 as soon as signs and symptoms begin-DO NOT go       Back to bed or wait to see if you get better-TIME IS BRAIN.

## 2021-10-07 NOTE — CDMP QUERY
Patient admitted with bladder injury with open wound into cavity. If possible, please document in progress notes and discharge summary if you are evaluating and/or treating any of the following:    [[Vesicoperineal fistula[de-identified] This patient has a vesicoperineal fistula]]  [[ No vesicoperineal fistula. [de-identified] This patient does not have a vesicoperineal fistula.]]    The medical record reflects the following:    Risk Factors: recent completion proctectomy with perineal approach, repair of cystotomy, closure of perineum    Clinical Indicators: urine leaking from anus/perineal wound, documentation: \"Impression: Possible vesicoperineal fistula. \"    Treatment: CT abdomen/pelvis, removal and re-insertion of brito catheter    Thank you,  CDI Team/Clinical Documentation Improvement  [phone]

## 2021-10-11 ENCOUNTER — HOME HEALTH ADMISSION (OUTPATIENT)
Dept: HOME HEALTH SERVICES | Facility: HOME HEALTH | Age: 76
End: 2021-10-11
Payer: MEDICARE

## 2021-10-11 NOTE — PROGRESS NOTES
Physician Progress Note      Ivania Bernabe  CSN #:                  [id number]  :                       1945  ADMIT DATE:       2021 9:59 AM  DISCH DATE:        2021 1:06 PM  RESPONDING  PROVIDER #:        Ene Melton MD          QUERY TEXT:    Patient admitted with bladder injury with open wound into cavity. If possible, please document in progress notes and discharge summary if you are evaluating and/or treating any of the following: The medical record reflects the following:  Risk Factors: recent completion proctectomy with perineal approach, repair of cystotomy, closure of perineum  Clinical Indicators: urine leaking from anus/perineal wound, documentation: \"Impression: Possible vesicoperineal fistula. \"  Treatment: CT abdomen/pelvis, removal and re-insertion of brito catheter  Options provided:  -- Vesicoperineal fistula  -- No vesicoperineal fistula.   -- Other - I will add my own diagnosis  -- Disagree - Not applicable / Not valid  -- Disagree - Clinically unable to determine / Unknown  -- Refer to Clinical Documentation Reviewer    PROVIDER RESPONSE TEXT:    This patient has a vesicoperineal fistula    Query created by: Abdiel Garcia on 10/8/2021 12:01 PM      Electronically signed by:  Ene Melton MD 10/11/2021 8:16 AM

## 2021-10-14 ENCOUNTER — HOME CARE VISIT (OUTPATIENT)
Dept: SCHEDULING | Facility: HOME HEALTH | Age: 76
End: 2021-10-14
Payer: MEDICARE

## 2021-10-14 VITALS
HEART RATE: 86 BPM | SYSTOLIC BLOOD PRESSURE: 100 MMHG | DIASTOLIC BLOOD PRESSURE: 60 MMHG | OXYGEN SATURATION: 98 % | TEMPERATURE: 98.6 F | RESPIRATION RATE: 18 BRPM

## 2021-10-14 PROCEDURE — G0299 HHS/HOSPICE OF RN EA 15 MIN: HCPCS

## 2021-10-14 PROCEDURE — 400013 HH SOC

## 2021-10-14 PROCEDURE — 3331090002 HH PPS REVENUE DEBIT

## 2021-10-14 PROCEDURE — 3331090001 HH PPS REVENUE CREDIT

## 2021-10-14 PROCEDURE — 400018 HH-NO PAY CLAIM PROCEDURE

## 2021-10-15 ENCOUNTER — HOME CARE VISIT (OUTPATIENT)
Dept: SCHEDULING | Facility: HOME HEALTH | Age: 76
End: 2021-10-15
Payer: MEDICARE

## 2021-10-15 VITALS
SYSTOLIC BLOOD PRESSURE: 132 MMHG | RESPIRATION RATE: 18 BRPM | HEART RATE: 98 BPM | TEMPERATURE: 98.1 F | OXYGEN SATURATION: 98 % | DIASTOLIC BLOOD PRESSURE: 80 MMHG

## 2021-10-15 PROCEDURE — G0151 HHCP-SERV OF PT,EA 15 MIN: HCPCS

## 2021-10-15 PROCEDURE — G0299 HHS/HOSPICE OF RN EA 15 MIN: HCPCS

## 2021-10-15 PROCEDURE — 3331090002 HH PPS REVENUE DEBIT

## 2021-10-15 PROCEDURE — 3331090001 HH PPS REVENUE CREDIT

## 2021-10-16 PROCEDURE — 3331090001 HH PPS REVENUE CREDIT

## 2021-10-16 PROCEDURE — 3331090002 HH PPS REVENUE DEBIT

## 2021-10-17 PROCEDURE — 3331090002 HH PPS REVENUE DEBIT

## 2021-10-17 PROCEDURE — 3331090001 HH PPS REVENUE CREDIT

## 2021-10-18 ENCOUNTER — HOME CARE VISIT (OUTPATIENT)
Dept: SCHEDULING | Facility: HOME HEALTH | Age: 76
End: 2021-10-18
Payer: MEDICARE

## 2021-10-18 VITALS
SYSTOLIC BLOOD PRESSURE: 128 MMHG | DIASTOLIC BLOOD PRESSURE: 84 MMHG | HEART RATE: 81 BPM | TEMPERATURE: 98 F | RESPIRATION RATE: 18 BRPM | OXYGEN SATURATION: 97 %

## 2021-10-18 PROCEDURE — G0299 HHS/HOSPICE OF RN EA 15 MIN: HCPCS

## 2021-10-18 PROCEDURE — 3331090001 HH PPS REVENUE CREDIT

## 2021-10-18 PROCEDURE — 3331090002 HH PPS REVENUE DEBIT

## 2021-10-19 ENCOUNTER — HOME CARE VISIT (OUTPATIENT)
Dept: HOME HEALTH SERVICES | Facility: HOME HEALTH | Age: 76
End: 2021-10-19
Payer: MEDICARE

## 2021-10-19 VITALS
OXYGEN SATURATION: 99 % | SYSTOLIC BLOOD PRESSURE: 108 MMHG | HEART RATE: 81 BPM | TEMPERATURE: 97.6 F | DIASTOLIC BLOOD PRESSURE: 64 MMHG | RESPIRATION RATE: 16 BRPM

## 2021-10-19 PROCEDURE — 3331090002 HH PPS REVENUE DEBIT

## 2021-10-19 PROCEDURE — 3331090001 HH PPS REVENUE CREDIT

## 2021-10-20 ENCOUNTER — HOME CARE VISIT (OUTPATIENT)
Dept: SCHEDULING | Facility: HOME HEALTH | Age: 76
End: 2021-10-20
Payer: MEDICARE

## 2021-10-20 PROCEDURE — 3331090001 HH PPS REVENUE CREDIT

## 2021-10-20 PROCEDURE — G0299 HHS/HOSPICE OF RN EA 15 MIN: HCPCS

## 2021-10-20 PROCEDURE — 3331090002 HH PPS REVENUE DEBIT

## 2021-10-21 VITALS
SYSTOLIC BLOOD PRESSURE: 122 MMHG | DIASTOLIC BLOOD PRESSURE: 64 MMHG | OXYGEN SATURATION: 95 % | TEMPERATURE: 96.4 F | RESPIRATION RATE: 15 BRPM | HEART RATE: 84 BPM

## 2021-10-21 PROCEDURE — 3331090001 HH PPS REVENUE CREDIT

## 2021-10-21 PROCEDURE — 3331090002 HH PPS REVENUE DEBIT

## 2021-10-22 ENCOUNTER — HOME CARE VISIT (OUTPATIENT)
Dept: SCHEDULING | Facility: HOME HEALTH | Age: 76
End: 2021-10-22
Payer: MEDICARE

## 2021-10-22 VITALS
RESPIRATION RATE: 16 BRPM | OXYGEN SATURATION: 100 % | HEART RATE: 66 BPM | SYSTOLIC BLOOD PRESSURE: 112 MMHG | DIASTOLIC BLOOD PRESSURE: 76 MMHG | TEMPERATURE: 96.7 F

## 2021-10-22 PROCEDURE — 3331090001 HH PPS REVENUE CREDIT

## 2021-10-22 PROCEDURE — 3331090002 HH PPS REVENUE DEBIT

## 2021-10-22 PROCEDURE — G0299 HHS/HOSPICE OF RN EA 15 MIN: HCPCS

## 2021-10-23 PROCEDURE — 3331090001 HH PPS REVENUE CREDIT

## 2021-10-23 PROCEDURE — 3331090002 HH PPS REVENUE DEBIT

## 2021-10-24 PROCEDURE — 3331090002 HH PPS REVENUE DEBIT

## 2021-10-24 PROCEDURE — 3331090001 HH PPS REVENUE CREDIT

## 2021-10-25 ENCOUNTER — HOME CARE VISIT (OUTPATIENT)
Dept: SCHEDULING | Facility: HOME HEALTH | Age: 76
End: 2021-10-25
Payer: MEDICARE

## 2021-10-25 VITALS
OXYGEN SATURATION: 98 % | SYSTOLIC BLOOD PRESSURE: 120 MMHG | RESPIRATION RATE: 16 BRPM | DIASTOLIC BLOOD PRESSURE: 68 MMHG | TEMPERATURE: 98.2 F | HEART RATE: 74 BPM

## 2021-10-25 PROCEDURE — 3331090001 HH PPS REVENUE CREDIT

## 2021-10-25 PROCEDURE — G0299 HHS/HOSPICE OF RN EA 15 MIN: HCPCS

## 2021-10-25 PROCEDURE — 3331090002 HH PPS REVENUE DEBIT

## 2021-10-26 PROCEDURE — 3331090001 HH PPS REVENUE CREDIT

## 2021-10-26 PROCEDURE — 3331090002 HH PPS REVENUE DEBIT

## 2021-10-27 ENCOUNTER — HOME CARE VISIT (OUTPATIENT)
Dept: SCHEDULING | Facility: HOME HEALTH | Age: 76
End: 2021-10-27
Payer: MEDICARE

## 2021-10-27 VITALS — TEMPERATURE: 98.2 F | HEART RATE: 76 BPM | OXYGEN SATURATION: 99 %

## 2021-10-27 PROCEDURE — G0299 HHS/HOSPICE OF RN EA 15 MIN: HCPCS

## 2021-10-27 PROCEDURE — 3331090002 HH PPS REVENUE DEBIT

## 2021-10-27 PROCEDURE — 3331090001 HH PPS REVENUE CREDIT

## 2021-10-28 ENCOUNTER — HOSPITAL ENCOUNTER (OUTPATIENT)
Dept: INTERVENTIONAL RADIOLOGY/VASCULAR | Age: 76
Discharge: HOME | End: 2021-10-28
Attending: RADIOLOGY
Payer: MEDICARE

## 2021-10-28 VITALS
TEMPERATURE: 97.7 F | RESPIRATION RATE: 18 BRPM | DIASTOLIC BLOOD PRESSURE: 77 MMHG | HEART RATE: 76 BPM | WEIGHT: 127 LBS | OXYGEN SATURATION: 94 % | BODY MASS INDEX: 19.25 KG/M2 | SYSTOLIC BLOOD PRESSURE: 178 MMHG | HEIGHT: 68 IN

## 2021-10-28 DIAGNOSIS — N13.30 HYDRONEPHROSIS: ICD-10-CM

## 2021-10-28 PROCEDURE — 74011000250 HC RX REV CODE- 250: Performed by: RADIOLOGY

## 2021-10-28 PROCEDURE — 77030025702 HC BG URIN DRN MRTM -A

## 2021-10-28 PROCEDURE — 50435 EXCHANGE NEPHROSTOMY CATH: CPT

## 2021-10-28 PROCEDURE — 74011000636 HC RX REV CODE- 636: Performed by: RADIOLOGY

## 2021-10-28 PROCEDURE — 77030002916 HC SUT ETHLN J&J -A

## 2021-10-28 PROCEDURE — 3331090001 HH PPS REVENUE CREDIT

## 2021-10-28 PROCEDURE — 3331090002 HH PPS REVENUE DEBIT

## 2021-10-28 PROCEDURE — C1769 GUIDE WIRE: HCPCS

## 2021-10-28 PROCEDURE — C1729 CATH, DRAINAGE: HCPCS

## 2021-10-28 RX ORDER — LIDOCAINE HYDROCHLORIDE 20 MG/ML
2-20 INJECTION, SOLUTION INFILTRATION; PERINEURAL ONCE
Status: COMPLETED | OUTPATIENT
Start: 2021-10-28 | End: 2021-10-28

## 2021-10-28 RX ADMIN — IOPAMIDOL 50 ML: 612 INJECTION, SOLUTION INTRAVENOUS at 11:10

## 2021-10-28 RX ADMIN — LIDOCAINE HYDROCHLORIDE 200 MG: 20 INJECTION, SOLUTION INFILTRATION; PERINEURAL at 10:44

## 2021-10-28 NOTE — DISCHARGE INSTRUCTIONS
[address]  Department of Interventional Radiology  [facility]  [phone] Office  [phone] Fax    Nephrostomy Tube Discharge Instructions    General Information:   A nephrostomy is a tube inserted through your skin and into the kidney. The purpose of the tube is to drain urine outside of the body. This is done in the event fo a block or a damaged ureter. Most of these tubes are usually changed every 2-3 months. However, some patients may need to have their tubes changed more often. Home Care Instructions: You can resume your regular diet. Do not drink alcohol, drive, or make any important legal decisions in the next 24 hours. Do not lift anything heavier than a gallon of milk or do anything strenuous for the next 24 hours. You should not shower for 24 hours. You should not bathe or swim while you have this drain in place. Your doctor may arrange for home health to visit you to help take care of the tube. You should clean the tube and change the dressing at least every 48 hours and more as needed. Keep the dressing clean and dry. Keep up with how much drainage you get from the tube and report this to your doctor. Call If:   You should call your Physician and/or the Radiology Nurse if you have any bleeding other than a small spot on your bandage. Call if you have any signs of infection, fever, or increased pain at the site of the tube. Call if you should have leakage around the tube, a change in the appearance of the urine draining from the tube, increased pain in the lower back, or no drainage from the tube for 12 hours. Call if the tube has pulled back, or has been pulled out. Follow-Up Instructions:  Please see your ordering doctor as he/she has requested. To Reach Us: If you have any questions about your procedure, please call the Interventional Radiology department at [phone].  After business hours (5pm) and weekends, call the answering service at [phone] and ask for the Radiologist on call to be paged. Si tiene Preguntas acerca del procedimiento, por favor llame al departamento de Radiología Intervencional al [phone]. Después de horas de oficina (5 pm) y los fines de [place], llamar al Ulises Stafford al [phone] y pregunte por el Radiologo de [place] Savanna Melohianthony. Interventional Radiology General Nurse Discharge    After general anesthesia or intravenous sedation, for 24 hours or while taking prescription Narcotics:  · Limit your activities  · Do not drive and operate hazardous machinery  · Do not make important personal or business decisions  · Do  not drink alcoholic beverages  · If you have not urinated within 8 hours after discharge, please contact your surgeon on call. * Please give a list of your current medications to your Primary Care Provider. * Please update this list whenever your medications are discontinued, doses are     changed, or new medications (including over-the-counter products) are added. * Please carry medication information at all times in case of emergency situations. These are general instructions for a healthy lifestyle:    No smoking/ No tobacco products/ Avoid exposure to second hand smoke  Surgeon General's Warning:  Quitting smoking now greatly reduces serious risk to your health. Obesity, smoking, and sedentary lifestyle greatly increases your risk for illness  A healthy diet, regular physical exercise & weight monitoring are important for maintaining a healthy lifestyle    You may be retaining fluid if you have a history of heart failure or if you experience any of the following symptoms:  Weight gain of 3 pounds or more overnight or 5 pounds in a week, increased swelling in our hands or feet or shortness of breath while lying flat in bed.   Please call your doctor as soon as you notice any of these symptoms; do not wait until your next office visit. Recognize signs and symptoms of STROKE:  F-face looks uneven    A-arms unable to move or move unevenly    S-speech slurred or non-existent    T-time-call 911 as soon as signs and symptoms begin-DO NOT go       Back to bed or wait to see if you get better-TIME IS BRAIN.       Patient Signature:  Date: 10/28/2021  Discharging Nurse: Marques Schuler RN

## 2021-10-28 NOTE — PROCEDURES
Department of Interventional Radiology  [phone]        Interventional Radiology Brief Procedure Note    Patient: Linette Ulloa MRN: [id number]  SSN: [id number]    YOB: 1945  Age: 64 y.o. Sex: male      Date of Procedure: 10/28/2021    Pre-Procedure Diagnosis: Bladder rupture    Post-Procedure Diagnosis: SAME    Procedure(s): Bilateral nephrostomy drain exchange with antegrade nephrostograms    Brief Description of Procedure: Bilateral PCN exchange, 10 Fr. Persistent small rupture in the posterior wall of the bladder. Tubes were capped after discussion with Dr. Anisa Alvarez.  Follow up in 1 week for possible removal.     Performed By: Justino Hernandez MD     Assistants: None    Anesthesia:Lidocaine    Estimated Blood Loss: Less than 10ml    Specimens:  None    Implants:  Nephrostomy Drain    Findings: As above    Complications: None    Signed By: Justino Hernandez MD     October 28, 2021

## 2021-10-29 ENCOUNTER — HOME CARE VISIT (OUTPATIENT)
Dept: SCHEDULING | Facility: HOME HEALTH | Age: 76
End: 2021-10-29
Payer: MEDICARE

## 2021-10-29 PROCEDURE — 3331090002 HH PPS REVENUE DEBIT

## 2021-10-29 PROCEDURE — 3331090001 HH PPS REVENUE CREDIT

## 2021-10-29 PROCEDURE — G0299 HHS/HOSPICE OF RN EA 15 MIN: HCPCS

## 2021-10-30 PROCEDURE — 3331090002 HH PPS REVENUE DEBIT

## 2021-10-30 PROCEDURE — 3331090001 HH PPS REVENUE CREDIT

## 2021-10-31 PROCEDURE — 3331090001 HH PPS REVENUE CREDIT

## 2021-10-31 PROCEDURE — 3331090002 HH PPS REVENUE DEBIT

## 2021-11-01 ENCOUNTER — HOSPITAL ENCOUNTER (EMERGENCY)
Age: 76
Discharge: HOME | End: 2021-11-01
Attending: EMERGENCY MEDICINE
Payer: MEDICARE

## 2021-11-01 ENCOUNTER — HOME CARE VISIT (OUTPATIENT)
Dept: SCHEDULING | Facility: HOME HEALTH | Age: 76
End: 2021-11-01
Payer: MEDICARE

## 2021-11-01 VITALS
SYSTOLIC BLOOD PRESSURE: 132 MMHG | OXYGEN SATURATION: 98 % | RESPIRATION RATE: 16 BRPM | DIASTOLIC BLOOD PRESSURE: 78 MMHG | HEART RATE: 76 BPM | TEMPERATURE: 97.9 F

## 2021-11-01 VITALS
OXYGEN SATURATION: 97 % | RESPIRATION RATE: 16 BRPM | TEMPERATURE: 97.2 F | SYSTOLIC BLOOD PRESSURE: 122 MMHG | DIASTOLIC BLOOD PRESSURE: 78 MMHG | HEART RATE: 82 BPM

## 2021-11-01 VITALS
RESPIRATION RATE: 20 BRPM | BODY MASS INDEX: 18.18 KG/M2 | HEIGHT: 70 IN | DIASTOLIC BLOOD PRESSURE: 84 MMHG | WEIGHT: 127 LBS | TEMPERATURE: 98.5 F | HEART RATE: 83 BPM | OXYGEN SATURATION: 95 % | SYSTOLIC BLOOD PRESSURE: 169 MMHG

## 2021-11-01 DIAGNOSIS — R33.9 URINARY RETENTION: ICD-10-CM

## 2021-11-01 DIAGNOSIS — T83.9XXA FOLEY CATHETER PROBLEM, INITIAL ENCOUNTER (HCC): Primary | ICD-10-CM

## 2021-11-01 PROCEDURE — A4320 IRRIGATION TRAY: HCPCS

## 2021-11-01 PROCEDURE — 99283 EMERGENCY DEPT VISIT LOW MDM: CPT

## 2021-11-01 PROCEDURE — A4358 URINARY LEG OR ABDOMEN BAG: HCPCS

## 2021-11-01 PROCEDURE — 3331090002 HH PPS REVENUE DEBIT

## 2021-11-01 PROCEDURE — G0299 HHS/HOSPICE OF RN EA 15 MIN: HCPCS

## 2021-11-01 PROCEDURE — A4333 URINARY CATH ANCHOR DEVICE: HCPCS

## 2021-11-01 PROCEDURE — 3331090001 HH PPS REVENUE CREDIT

## 2021-11-01 NOTE — ED NOTES
Pts brito irragated per MDs order, 120 ml came out. Leg bag was replaced. Another 400 ml noted in the collection bag after bag was replaced. MD aware of findings.

## 2021-11-01 NOTE — DISCHARGE INSTRUCTIONS
Call your urologist in the morning for follow-up visit  Monitor output from your catheter  Continue to drink plenty of fluids  Monitor for fever    Return to ER for any worsening symptoms or new problems which may arise

## 2021-11-01 NOTE — ED NOTES
I have reviewed discharge instructions with the patient. The patient verbalized understanding. Patient left ED via Discharge Method: ambulatory to Home. Opportunity for questions and clarification provided. Patient given 0 scripts. To continue your aftercare when you leave the hospital, you may receive an automated call from our care team to check in on how you are doing. This is a free service and part of our promise to provide the best care and service to meet your aftercare needs.  If you have questions, or wish to unsubscribe from this service please call [phone]. Thank you for Choosing our [facility] Emergency Department.

## 2021-11-01 NOTE — ED PROVIDER NOTES
33-year-old male presents with concerns of a urinary retention and Bishop catheter leakage  Complicated history of colorectal cancer which involved surgery and radiation treatments has also had several abdominal hernia repairs  Currently has a colostomy. Patient had a prior history of bladder rupture and nephrostomy tubes placed  No fever vomiting or diarrhea tonight    The history is provided by the patient and the EMS personnel. Urinary Retention   This is a recurrent problem. The current episode started 2 days ago. The problem occurs constantly. The problem has been gradually worsening. The pain is associated with previous surgery. The pain is located in the suprapubic region. The quality of the pain is dull and cramping. The pain is moderate. Associated symptoms include dysuria and arthralgias. Pertinent negatives include no fever, no diarrhea, no nausea, no vomiting, no constipation, no headaches, no myalgias, no trauma, no chest pain and no back pain. The pain is worsened by activity. The pain is relieved by nothing. Past workup includes surgery. The patient's surgical history includes colectomy.        Past Medical History:   Diagnosis Date    Acute deep vein thrombosis (DVT) of non-extremity vein 5/20/2019    Family history of malignant neoplasm of gastrointestinal tract     mother colon/ovarian cancer 67    Fecal incontinence     LAR syndrome    Former cigarette smoker     History of rectal cancer     Hypothyroid     stable w/med    Infection and inflammatory reaction due to other internal prosthetic devices, implants and grafts, subsequent encounter 2017    abd wound/mesh colostomy    Insomnia     takes meds    Major depression     Osteoarthritis, hand     Personal history of colonic polyps 9/2013    x 1    Personal history of malignant neoplasm of rectum, rectosigmoid junction, and anus 9/2013    Psychiatric disorder     anxiety       Past Surgical History:   Procedure Laterality Date    COLONOSCOPY N/A 2/12/2018    COLONOSCOPY / BMI=21 performed by Althea Fonseca MD at [address] [facility] N/A 3/5/2021    COLONOSCOPY/BMI 19 performed by Neisha Mcgill MD at [address]  2/12/2018         COLONOSCOPY THRU STOMA,LESN RMVL W/SNARE  3/5/2021         ENDOSCOPY, COLON, DIAGNOSTIC  last 2/3/15    Emily--no polyps--3 year recall    HX COLOSTOMY  5/11/16    HX GI  4/2016    Sacral nerve stimlator lead trial (unsucessful) and removal    HX HERNIA REPAIR  3/2015, 5/2016    HX HERNIA REPAIR      and Colostomy in May    HX OTHER SURGICAL  10/7/2013    Emily---endorectal us    HX OTHER SURGICAL Bilateral     cataracts  2017    HX POLYPECTOMY      HX TOTAL COLECTOMY  11/2013    Emily--lap LAR with coloproctostomy, mobilization splenic flexure, diverting loop ileostomy    HX TOTAL COLECTOMY Right 8/2014    for leak    HX VASCULAR ACCESS Left 4/14    single lumen port/subsequently removed    IR CHANGE ABSCESS / CYST CATHETER  5/27/2021    IR CHANGE ABSCESS / CYST CATHETER  6/14/2021    IR CHANGE ABSCESS / CYST CATHETER  7/8/2021    IR CHANGE ABSCESS / CYST CATHETER  8/5/2021    IR EXCHANGE NEPHRO PERC LT SI  10/28/2021    IR INJ ABS CYST VIA CATHETER / TUBE  5/20/2021    IR INJ ABS CYST VIA CATHETER / TUBE  6/4/2021    IR INJ NEPHRO/URETEROGRAM LT EXISTING ACCESS SI  10/4/2021    IR INSERT TUNL CVC W/O PORT OVER 5 YR  5/20/2019    IR NEPHROSTOMY PERC LT PLC CATH  SI  9/16/2021    IR REPLACE CVC TUNNELED W/O PORT  5/30/2019         Family History:   Problem Relation Age of Onset    Cancer Mother         colon and ovarian    Cancer Brother         prostate    Alcohol abuse Brother     Cancer Maternal Grandmother         bone    Alcohol abuse Father     Alcohol abuse Sister     Stroke Paternal Grandfather        Social History     Socioeconomic History    Marital status: SINGLE     Spouse name: Not on file    Number of children: Not on file    Years of education: Not on file    Highest education level: Not on file   Occupational History    Not on file   Tobacco Use    Smoking status: Former Smoker     Packs/day: 1.00     Years: 4.00     Pack years: 4.00     Types: Cigarettes     Quit date: 1963     Years since quittin.0    Smokeless tobacco: Never Used   Substance and Sexual Activity    Alcohol use: Not Currently     Alcohol/week: 11.7 standard drinks     Types: 14 Cans of beer per week     Comment: advised stop drinking given issues    Drug use: No    Sexual activity: Never   Other Topics Concern    Not on file   Social History Narrative    Not on file     Social Determinants of Health     Financial Resource Strain:     Difficulty of Paying Living Expenses:    Food Insecurity:     Worried About Running Out of Food in the Last Year:     [address] in the Last Year:    Transportation Needs:     Lack of Transportation (Medical):  Lack of Transportation (Non-Medical):    Physical Activity:     Days of Exercise per Week:     Minutes of Exercise per Session:    Stress:     Feeling of Stress :    Social Connections:     Frequency of Communication with Friends and Family:     Frequency of Social Gatherings with Friends and Family:     Attends Islam Services:     Active Member of Clubs or Organizations:     Attends Club or Organization Meetings:     Marital Status:    Intimate Partner Violence:     Fear of Current or Ex-Partner:     Emotionally Abused:     Physically Abused:     Sexually Abused: ALLERGIES: Patient has no known allergies. Review of Systems   Constitutional: Negative for activity change, chills, diaphoresis and fever. HENT: Negative for dental problem, hearing loss, nosebleeds, rhinorrhea and sore throat. Eyes: Negative for pain, discharge, redness and visual disturbance. Respiratory: Negative for cough, chest tightness and shortness of breath.     Cardiovascular: Negative for chest pain, palpitations and leg swelling. Gastrointestinal: Negative for abdominal pain, constipation, diarrhea, nausea and vomiting. Endocrine: Negative for cold intolerance, heat intolerance, polydipsia and polyuria. Genitourinary: Positive for dysuria. Negative for flank pain. Musculoskeletal: Positive for arthralgias. Negative for back pain, joint swelling, myalgias and neck pain. Skin: Negative for pallor and rash. Allergic/Immunologic: Negative for environmental allergies and food allergies. Neurological: Negative for dizziness, tremors, light-headedness, numbness and headaches. Hematological: Negative for adenopathy. Does not bruise/bleed easily. Psychiatric/Behavioral: Negative for confusion and dysphoric mood. The patient is not nervous/anxious and is not hyperactive. All other systems reviewed and are negative. Vitals:    11/01/21 0256   BP: (!) 147/86   Pulse: 85   Resp: 20   Temp: 98.5 °F (36.9 °C)   SpO2: 95%   Weight: 57.6 kg (127 lb)   Height: 5' 10\" (1.778 m)            Physical Exam  Vitals and nursing note reviewed. Constitutional:       General: He is in acute distress. Appearance: Normal appearance. He is well-developed and normal weight. HENT:      Head: Normocephalic and atraumatic. Right Ear: External ear normal.      Left Ear: External ear normal.      Mouth/Throat:      Pharynx: No oropharyngeal exudate. Eyes:      General: No scleral icterus. Conjunctiva/sclera: Conjunctivae normal.      Pupils: Pupils are equal, round, and reactive to light. Neck:      Thyroid: No thyromegaly. Vascular: No JVD. Cardiovascular:      Rate and Rhythm: Normal rate and regular rhythm. Pulses: Normal pulses. Heart sounds: Normal heart sounds. No murmur heard. No friction rub. No gallop. Pulmonary:      Effort: Pulmonary effort is normal. No respiratory distress. Breath sounds: Normal breath sounds. No wheezing.    Abdominal: General: A surgical scar is present. The ostomy site is clean. Bowel sounds are normal. There is distension. Palpations: Abdomen is soft. Tenderness: There is abdominal tenderness in the suprapubic area. Musculoskeletal:         General: No tenderness or deformity. Normal range of motion. Cervical back: Normal range of motion and neck supple. Skin:     General: Skin is warm and dry. Capillary Refill: Capillary refill takes less than 2 seconds. Findings: No rash. Neurological:      General: No focal deficit present. Mental Status: He is alert and oriented to person, place, and time. Cranial Nerves: No cranial nerve deficit. Sensory: No sensory deficit. Motor: No abnormal muscle tone. Coordination: Coordination normal.   Psychiatric:         Mood and Affect: Mood normal.         Behavior: Behavior normal.         Thought Content: Thought content normal.         Judgment: Judgment normal.          MDM  Number of Diagnoses or Management Options  Bishop catheter problem, initial encounter [facility]): new and requires workup  Urinary retention: new and requires workup  Diagnosis management comments: 3:37 AM  Reviewed case with on-call urology, Dr. Alicja Mccormick that, if necessary, changing the Bishop catheter here in the emergency department will be safe  He reports that patient had a cystoscope performed about 2 weeks ago with no other acute findings that would complicate the placement of a Bishop catheter    We will attempt to irrigate the Bishop first to see we can get adequate drainage.   If this is unsuccessful we will moved to changing out the Bishop catheter    4:06 AM  Bishop catheter repositioned and irrigated by RN  Greater than 300 mL of urine output into a new bag currently    At this point with a functioning catheter, we will not change out his catheter   patient advised to discuss office follow-up visit with his urologist         Amount and/or Complexity of Data Reviewed  Decide to obtain previous medical records or to obtain history from someone other than the patient: yes  Review and summarize past medical records: yes  Discuss the patient with other providers: yes    Risk of Complications, Morbidity, and/or Mortality  Presenting problems: moderate  Diagnostic procedures: low  Management options: low  General comments: Elements of this note have been dictated via voice recognition software. Text and phrases may be limited by the accuracy of the software. The chart has been reviewed, but errors may still be present.       Patient Progress  Patient progress: improved         Procedures

## 2021-11-01 NOTE — ED TRIAGE NOTES
Patient presents to the ED with complaints of difficulty urinating since 10pm 10/31/21. Patient currently has a urinary catheter that is not draining. Patient states he changed his bag out from small bag to a larger bag and that's when it quit draining. Patient also states he is urinating around the catheter. Complaints of lower abd pain associated with difficulty urinating. Patient has bilateral nephrostomy tubes that are blocked. Patient has extensive medical history.

## 2021-11-02 PROCEDURE — 3331090001 HH PPS REVENUE CREDIT

## 2021-11-02 PROCEDURE — 3331090002 HH PPS REVENUE DEBIT

## 2021-11-03 ENCOUNTER — HOSPITAL ENCOUNTER (OUTPATIENT)
Dept: INTERVENTIONAL RADIOLOGY/VASCULAR | Age: 76
Discharge: HOME | End: 2021-11-03
Attending: RADIOLOGY
Payer: MEDICARE

## 2021-11-03 VITALS
DIASTOLIC BLOOD PRESSURE: 83 MMHG | RESPIRATION RATE: 18 BRPM | OXYGEN SATURATION: 97 % | HEART RATE: 78 BPM | TEMPERATURE: 98.8 F | SYSTOLIC BLOOD PRESSURE: 164 MMHG

## 2021-11-03 DIAGNOSIS — N13.5 URETERAL OBSTRUCTION: ICD-10-CM

## 2021-11-03 PROCEDURE — 3331090002 HH PPS REVENUE DEBIT

## 2021-11-03 PROCEDURE — 50389 REMOVE RENAL TUBE W/FLUORO: CPT

## 2021-11-03 PROCEDURE — 74011000636 HC RX REV CODE- 636: Performed by: RADIOLOGY

## 2021-11-03 PROCEDURE — 3331090001 HH PPS REVENUE CREDIT

## 2021-11-03 PROCEDURE — 77030025702 HC BG URIN DRN MRTM -A

## 2021-11-03 PROCEDURE — 50435 EXCHANGE NEPHROSTOMY CATH: CPT

## 2021-11-03 RX ORDER — LIDOCAINE HYDROCHLORIDE 20 MG/ML
40-120 INJECTION, SOLUTION INFILTRATION; PERINEURAL ONCE
Status: DISCONTINUED | OUTPATIENT
Start: 2021-11-03 | End: 2021-11-03

## 2021-11-03 RX ORDER — SODIUM CHLORIDE 9 MG/ML
25 INJECTION, SOLUTION INTRAVENOUS CONTINUOUS
Status: DISCONTINUED | OUTPATIENT
Start: 2021-11-03 | End: 2021-11-03

## 2021-11-03 RX ORDER — MIDAZOLAM HYDROCHLORIDE 1 MG/ML
.25-2 INJECTION, SOLUTION INTRAMUSCULAR; INTRAVENOUS
Status: DISCONTINUED | OUTPATIENT
Start: 2021-11-03 | End: 2021-11-03

## 2021-11-03 RX ORDER — FENTANYL CITRATE 50 UG/ML
12.5-1 INJECTION, SOLUTION INTRAMUSCULAR; INTRAVENOUS
Status: DISCONTINUED | OUTPATIENT
Start: 2021-11-03 | End: 2021-11-03

## 2021-11-03 RX ADMIN — IOPAMIDOL 20 ML: 612 INJECTION, SOLUTION INTRAVENOUS at 11:38

## 2021-11-04 ENCOUNTER — HOME CARE VISIT (OUTPATIENT)
Dept: SCHEDULING | Facility: HOME HEALTH | Age: 76
End: 2021-11-04
Payer: MEDICARE

## 2021-11-04 PROCEDURE — G0299 HHS/HOSPICE OF RN EA 15 MIN: HCPCS

## 2021-11-04 PROCEDURE — A4407 EXT WEAR OST SKN BARR <=4SQ": HCPCS

## 2021-11-04 PROCEDURE — A4385 OST SKN BARRIER SLD EXT WEAR: HCPCS

## 2021-11-04 PROCEDURE — 3331090001 HH PPS REVENUE CREDIT

## 2021-11-04 PROCEDURE — A4425 OST PCH DRAIN FOR BARRIER FL: HCPCS

## 2021-11-04 PROCEDURE — 3331090002 HH PPS REVENUE DEBIT

## 2021-11-05 ENCOUNTER — HOME CARE VISIT (OUTPATIENT)
Dept: HOME HEALTH SERVICES | Facility: HOME HEALTH | Age: 76
End: 2021-11-05
Payer: MEDICARE

## 2021-11-05 VITALS
TEMPERATURE: 98.6 F | HEART RATE: 88 BPM | DIASTOLIC BLOOD PRESSURE: 62 MMHG | RESPIRATION RATE: 15 BRPM | SYSTOLIC BLOOD PRESSURE: 118 MMHG | OXYGEN SATURATION: 99 %

## 2021-11-05 PROCEDURE — 3331090002 HH PPS REVENUE DEBIT

## 2021-11-05 PROCEDURE — 3331090001 HH PPS REVENUE CREDIT

## 2021-11-06 PROCEDURE — 3331090002 HH PPS REVENUE DEBIT

## 2021-11-06 PROCEDURE — 3331090001 HH PPS REVENUE CREDIT

## 2021-11-07 PROCEDURE — 3331090001 HH PPS REVENUE CREDIT

## 2021-11-07 PROCEDURE — 3331090002 HH PPS REVENUE DEBIT

## 2021-11-08 ENCOUNTER — HOME CARE VISIT (OUTPATIENT)
Dept: HOME HEALTH SERVICES | Facility: HOME HEALTH | Age: 76
End: 2021-11-08
Payer: MEDICARE

## 2021-11-08 ENCOUNTER — HOSPITAL ENCOUNTER (OUTPATIENT)
Dept: CT IMAGING | Age: 76
Discharge: HOME | End: 2021-11-08
Attending: UROLOGY
Payer: MEDICARE

## 2021-11-08 ENCOUNTER — HOME CARE VISIT (OUTPATIENT)
Dept: SCHEDULING | Facility: HOME HEALTH | Age: 76
End: 2021-11-08
Payer: MEDICARE

## 2021-11-08 VITALS
OXYGEN SATURATION: 95 % | DIASTOLIC BLOOD PRESSURE: 74 MMHG | RESPIRATION RATE: 16 BRPM | TEMPERATURE: 97.7 F | HEART RATE: 78 BPM | SYSTOLIC BLOOD PRESSURE: 128 MMHG

## 2021-11-08 DIAGNOSIS — N32.89 BLADDER RUPTURE: ICD-10-CM

## 2021-11-08 LAB — CREAT BLD-MCNC: 1.3 MG/DL (ref 0.8–1.5)

## 2021-11-08 PROCEDURE — 74011000258 HC RX REV CODE- 258: Performed by: UROLOGY

## 2021-11-08 PROCEDURE — 82565 ASSAY OF CREATININE: CPT

## 2021-11-08 PROCEDURE — 74011000636 HC RX REV CODE- 636: Performed by: UROLOGY

## 2021-11-08 PROCEDURE — 3331090001 HH PPS REVENUE CREDIT

## 2021-11-08 PROCEDURE — G0299 HHS/HOSPICE OF RN EA 15 MIN: HCPCS

## 2021-11-08 PROCEDURE — 74177 CT ABD & PELVIS W/CONTRAST: CPT

## 2021-11-08 PROCEDURE — 3331090002 HH PPS REVENUE DEBIT

## 2021-11-08 RX ORDER — SODIUM CHLORIDE 0.9 % (FLUSH) 0.9 %
10 SYRINGE (ML) INJECTION
Status: COMPLETED | OUTPATIENT
Start: 2021-11-08 | End: 2021-11-08

## 2021-11-08 RX ADMIN — Medication 10 ML: at 12:57

## 2021-11-08 RX ADMIN — IOPAMIDOL 100 ML: 755 INJECTION, SOLUTION INTRAVENOUS at 12:57

## 2021-11-08 RX ADMIN — SODIUM CHLORIDE 100 ML: 900 INJECTION, SOLUTION INTRAVENOUS at 12:57

## 2021-11-09 PROCEDURE — 3331090001 HH PPS REVENUE CREDIT

## 2021-11-09 PROCEDURE — 3331090002 HH PPS REVENUE DEBIT

## 2021-11-10 ENCOUNTER — HOME CARE VISIT (OUTPATIENT)
Dept: SCHEDULING | Facility: HOME HEALTH | Age: 76
End: 2021-11-10
Payer: MEDICARE

## 2021-11-10 VITALS
HEART RATE: 88 BPM | OXYGEN SATURATION: 99 % | DIASTOLIC BLOOD PRESSURE: 84 MMHG | RESPIRATION RATE: 16 BRPM | TEMPERATURE: 98.7 F | SYSTOLIC BLOOD PRESSURE: 122 MMHG

## 2021-11-10 PROCEDURE — G0299 HHS/HOSPICE OF RN EA 15 MIN: HCPCS

## 2021-11-10 PROCEDURE — 3331090002 HH PPS REVENUE DEBIT

## 2021-11-10 PROCEDURE — 3331090001 HH PPS REVENUE CREDIT

## 2021-11-11 PROCEDURE — 3331090002 HH PPS REVENUE DEBIT

## 2021-11-11 PROCEDURE — 3331090001 HH PPS REVENUE CREDIT

## 2021-11-12 ENCOUNTER — HOME CARE VISIT (OUTPATIENT)
Dept: SCHEDULING | Facility: HOME HEALTH | Age: 76
End: 2021-11-12
Payer: MEDICARE

## 2021-11-12 VITALS
RESPIRATION RATE: 16 BRPM | OXYGEN SATURATION: 99 % | HEART RATE: 96 BPM | SYSTOLIC BLOOD PRESSURE: 114 MMHG | DIASTOLIC BLOOD PRESSURE: 70 MMHG

## 2021-11-12 PROCEDURE — G0299 HHS/HOSPICE OF RN EA 15 MIN: HCPCS

## 2021-11-12 PROCEDURE — 3331090001 HH PPS REVENUE CREDIT

## 2021-11-12 PROCEDURE — 3331090002 HH PPS REVENUE DEBIT

## 2021-11-13 PROCEDURE — 3331090001 HH PPS REVENUE CREDIT

## 2021-11-13 PROCEDURE — 400018 HH-NO PAY CLAIM PROCEDURE

## 2021-11-13 PROCEDURE — 3331090002 HH PPS REVENUE DEBIT

## 2021-11-14 PROCEDURE — 3331090002 HH PPS REVENUE DEBIT

## 2021-11-14 PROCEDURE — 3331090001 HH PPS REVENUE CREDIT

## 2021-11-15 ENCOUNTER — HOME CARE VISIT (OUTPATIENT)
Dept: SCHEDULING | Facility: HOME HEALTH | Age: 76
End: 2021-11-15
Payer: MEDICARE

## 2021-11-15 VITALS
TEMPERATURE: 98.5 F | HEART RATE: 78 BPM | SYSTOLIC BLOOD PRESSURE: 110 MMHG | OXYGEN SATURATION: 98 % | RESPIRATION RATE: 16 BRPM | DIASTOLIC BLOOD PRESSURE: 62 MMHG

## 2021-11-15 PROCEDURE — 400013 HH SOC

## 2021-11-15 PROCEDURE — 3331090001 HH PPS REVENUE CREDIT

## 2021-11-15 PROCEDURE — G0299 HHS/HOSPICE OF RN EA 15 MIN: HCPCS

## 2021-11-15 PROCEDURE — 3331090002 HH PPS REVENUE DEBIT

## 2021-11-16 PROCEDURE — 3331090001 HH PPS REVENUE CREDIT

## 2021-11-16 PROCEDURE — 3331090002 HH PPS REVENUE DEBIT

## 2021-11-17 ENCOUNTER — HOME CARE VISIT (OUTPATIENT)
Dept: SCHEDULING | Facility: HOME HEALTH | Age: 76
End: 2021-11-17
Payer: MEDICARE

## 2021-11-17 VITALS
RESPIRATION RATE: 16 BRPM | DIASTOLIC BLOOD PRESSURE: 78 MMHG | TEMPERATURE: 98.1 F | SYSTOLIC BLOOD PRESSURE: 108 MMHG | OXYGEN SATURATION: 98 % | HEART RATE: 94 BPM

## 2021-11-17 PROCEDURE — 3331090002 HH PPS REVENUE DEBIT

## 2021-11-17 PROCEDURE — G0299 HHS/HOSPICE OF RN EA 15 MIN: HCPCS

## 2021-11-17 PROCEDURE — 3331090001 HH PPS REVENUE CREDIT

## 2021-11-18 PROCEDURE — 3331090002 HH PPS REVENUE DEBIT

## 2021-11-18 PROCEDURE — 3331090001 HH PPS REVENUE CREDIT

## 2021-11-19 ENCOUNTER — HOME CARE VISIT (OUTPATIENT)
Dept: SCHEDULING | Facility: HOME HEALTH | Age: 76
End: 2021-11-19
Payer: MEDICARE

## 2021-11-19 PROCEDURE — G0299 HHS/HOSPICE OF RN EA 15 MIN: HCPCS

## 2021-11-19 PROCEDURE — 3331090001 HH PPS REVENUE CREDIT

## 2021-11-19 PROCEDURE — 3331090002 HH PPS REVENUE DEBIT

## 2021-11-20 VITALS
SYSTOLIC BLOOD PRESSURE: 118 MMHG | RESPIRATION RATE: 18 BRPM | DIASTOLIC BLOOD PRESSURE: 64 MMHG | OXYGEN SATURATION: 98 % | TEMPERATURE: 97.9 F | HEART RATE: 68 BPM

## 2021-11-20 PROCEDURE — 3331090002 HH PPS REVENUE DEBIT

## 2021-11-20 PROCEDURE — 3331090001 HH PPS REVENUE CREDIT

## 2021-11-21 PROCEDURE — 3331090002 HH PPS REVENUE DEBIT

## 2021-11-21 PROCEDURE — 3331090001 HH PPS REVENUE CREDIT

## 2021-11-22 ENCOUNTER — HOME CARE VISIT (OUTPATIENT)
Dept: SCHEDULING | Facility: HOME HEALTH | Age: 76
End: 2021-11-22
Payer: MEDICARE

## 2021-11-22 VITALS
TEMPERATURE: 97.9 F | SYSTOLIC BLOOD PRESSURE: 138 MMHG | HEART RATE: 75 BPM | OXYGEN SATURATION: 98 % | RESPIRATION RATE: 16 BRPM | DIASTOLIC BLOOD PRESSURE: 80 MMHG

## 2021-11-22 PROCEDURE — 3331090002 HH PPS REVENUE DEBIT

## 2021-11-22 PROCEDURE — G0299 HHS/HOSPICE OF RN EA 15 MIN: HCPCS

## 2021-11-22 PROCEDURE — 3331090001 HH PPS REVENUE CREDIT

## 2021-11-23 PROCEDURE — 3331090001 HH PPS REVENUE CREDIT

## 2021-11-23 PROCEDURE — 3331090002 HH PPS REVENUE DEBIT

## 2021-11-24 ENCOUNTER — HOSPITAL ENCOUNTER (OUTPATIENT)
Dept: INTERVENTIONAL RADIOLOGY/VASCULAR | Age: 76
Discharge: HOME | End: 2021-11-24
Attending: RADIOLOGY
Payer: MEDICARE

## 2021-11-24 VITALS
SYSTOLIC BLOOD PRESSURE: 151 MMHG | DIASTOLIC BLOOD PRESSURE: 72 MMHG | WEIGHT: 130 LBS | BODY MASS INDEX: 19.7 KG/M2 | HEIGHT: 68 IN | TEMPERATURE: 97.5 F | OXYGEN SATURATION: 97 % | RESPIRATION RATE: 16 BRPM | HEART RATE: 62 BPM

## 2021-11-24 DIAGNOSIS — N13.5 URETERAL OBSTRUCTION: ICD-10-CM

## 2021-11-24 PROCEDURE — 77030025702 HC BG URIN DRN MRTM -A

## 2021-11-24 PROCEDURE — 3331090002 HH PPS REVENUE DEBIT

## 2021-11-24 PROCEDURE — 3331090001 HH PPS REVENUE CREDIT

## 2021-11-24 PROCEDURE — 74011000636 HC RX REV CODE- 636: Performed by: RADIOLOGY

## 2021-11-24 PROCEDURE — 76080 X-RAY EXAM OF FISTULA: CPT

## 2021-11-24 RX ADMIN — IOPAMIDOL 8 ML: 612 INJECTION, SOLUTION INTRAVENOUS at 10:08

## 2021-11-24 NOTE — PROCEDURES
Department of Interventional Radiology  [phone]        Interventional Radiology Brief Procedure Note    Patient: Viola Pope MRN: [id number]  SSN: [id number]    YOB: 1945  Age: 64 y.o. Sex: male      Date of Procedure: 11/24/2021    Pre-Procedure Diagnosis: ureteral obstruction    Post-Procedure Diagnosis: SAME    Procedure(s): nephrostogram    Brief Description of Procedure: asabove    Performed By: Alicia Hazel MD     Assistants: None    Anesthesia:None    Estimated Blood Loss: None    Specimens:  None    Implants:  None    Findings: no hydro.   Very narrowed but patent distal ureter    Complications: None    Recommendations: needs ureteral stent     Follow Up: will schedule with sedation    Signed By: Alicia Hazel MD     November 24, 2021

## 2021-11-24 NOTE — PROGRESS NOTES
Recovery period without difficulty. Pt alert and oriented and denies pain. Dressing is clean, dry, and intact. Reviewed discharge instructions with patient, patient verbalized understanding.  Pt ambulatory off the unit

## 2021-11-24 NOTE — DISCHARGE INSTRUCTIONS
[address]  Department of Interventional Radiology  [facility]  [phone] Office  [phone] Fax    Nephrostomy Tube Discharge Instructions    General Information:   A nephrostomy is a tube inserted through your skin and into the kidney. The purpose of the tube is to drain urine outside of the body. This is done in the event fo a block or a damaged ureter. Most of these tubes are usually changed every 2-3 months. However, some patients may need to have their tubes changed more often. Home Care Instructions: You can resume your regular diet. Do not drink alcohol, drive, or make any important legal decisions in the next 24 hours. Do not lift anything heavier than a gallon of milk or do anything strenuous for the next 24 hours. You should not shower for 24 hours. You should not bathe or swim while you have this drain in place. Your doctor may arrange for home health to visit you to help take care of the tube. You should clean the tube and change the dressing at least every 48 hours and more as needed. Keep the dressing clean and dry. Keep up with how much drainage you get from the tube and report this to your doctor. Call If:   You should call your Physician and/or the Radiology Nurse if you have any bleeding other than a small spot on your bandage. Call if you have any signs of infection, fever, or increased pain at the site of the tube. Call if you should have leakage around the tube, a change in the appearance of the urine draining from the tube, increased pain in the lower back, or no drainage from the tube for 12 hours. Call if the tube has pulled back, or has been pulled out. Follow-Up Instructions:  Please see your ordering doctor as he/she has requested. To Reach Us: If you have any questions about your procedure, please call the Interventional Radiology department at [phone].  After business hours (5pm) and weekends, call the answering service at [phone] and ask for the Radiologist on call to be paged. Si tiene Preguntas acerca del procedimiento, por favor llame al departamento de Radiología Intervencional al [phone]. Después de horas de oficina (5 pm) y los fines de [place], llamar al Bhavya Stafford al [phone] y pregunte por el Radiologo de Tamra Otero. Interventional Radiology General Nurse Discharge    After general anesthesia or intravenous sedation, for 24 hours or while taking prescription Narcotics:  · Limit your activities  · Do not drive and operate hazardous machinery  · Do not make important personal or business decisions  · Do  not drink alcoholic beverages  · If you have not urinated within 8 hours after discharge, please contact your surgeon on call. * Please give a list of your current medications to your Primary Care Provider. * Please update this list whenever your medications are discontinued, doses are     changed, or new medications (including over-the-counter products) are added. * Please carry medication information at all times in case of emergency situations. These are general instructions for a healthy lifestyle:    No smoking/ No tobacco products/ Avoid exposure to second hand smoke  Surgeon General's Warning:  Quitting smoking now greatly reduces serious risk to your health. Obesity, smoking, and sedentary lifestyle greatly increases your risk for illness  A healthy diet, regular physical exercise & weight monitoring are important for maintaining a healthy lifestyle    You may be retaining fluid if you have a history of heart failure or if you experience any of the following symptoms:  Weight gain of 3 pounds or more overnight or 5 pounds in a week, increased swelling in our hands or feet or shortness of breath while lying flat in bed.   Please call your doctor as soon as you notice any of these symptoms; do not wait until your next office visit. Recognize signs and symptoms of STROKE:  F-face looks uneven    A-arms unable to move or move unevenly    S-speech slurred or non-existent    T-time-call 911 as soon as signs and symptoms begin-DO NOT go       Back to bed or wait to see if you get better-TIME IS BRAIN.

## 2021-11-25 PROCEDURE — 3331090001 HH PPS REVENUE CREDIT

## 2021-11-25 PROCEDURE — 3331090002 HH PPS REVENUE DEBIT

## 2021-11-26 ENCOUNTER — HOME CARE VISIT (OUTPATIENT)
Dept: SCHEDULING | Facility: HOME HEALTH | Age: 76
End: 2021-11-26
Payer: MEDICARE

## 2021-11-26 VITALS
OXYGEN SATURATION: 98 % | DIASTOLIC BLOOD PRESSURE: 71 MMHG | SYSTOLIC BLOOD PRESSURE: 116 MMHG | HEART RATE: 85 BPM | TEMPERATURE: 98 F | RESPIRATION RATE: 18 BRPM

## 2021-11-26 PROCEDURE — G0299 HHS/HOSPICE OF RN EA 15 MIN: HCPCS

## 2021-11-26 PROCEDURE — 3331090001 HH PPS REVENUE CREDIT

## 2021-11-26 PROCEDURE — 3331090002 HH PPS REVENUE DEBIT

## 2021-11-27 PROCEDURE — 3331090002 HH PPS REVENUE DEBIT

## 2021-11-27 PROCEDURE — 3331090001 HH PPS REVENUE CREDIT

## 2021-11-28 PROCEDURE — 3331090001 HH PPS REVENUE CREDIT

## 2021-11-28 PROCEDURE — 3331090002 HH PPS REVENUE DEBIT

## 2021-11-29 ENCOUNTER — HOME CARE VISIT (OUTPATIENT)
Dept: SCHEDULING | Facility: HOME HEALTH | Age: 76
End: 2021-11-29
Payer: MEDICARE

## 2021-11-29 VITALS
DIASTOLIC BLOOD PRESSURE: 68 MMHG | TEMPERATURE: 97.5 F | HEART RATE: 58 BPM | SYSTOLIC BLOOD PRESSURE: 128 MMHG | RESPIRATION RATE: 16 BRPM | OXYGEN SATURATION: 99 %

## 2021-11-29 PROCEDURE — 3331090001 HH PPS REVENUE CREDIT

## 2021-11-29 PROCEDURE — 3331090002 HH PPS REVENUE DEBIT

## 2021-11-29 PROCEDURE — G0299 HHS/HOSPICE OF RN EA 15 MIN: HCPCS

## 2021-11-30 PROCEDURE — 3331090002 HH PPS REVENUE DEBIT

## 2021-11-30 PROCEDURE — 3331090001 HH PPS REVENUE CREDIT

## 2021-12-01 ENCOUNTER — HOME CARE VISIT (OUTPATIENT)
Dept: SCHEDULING | Facility: HOME HEALTH | Age: 76
End: 2021-12-01
Payer: MEDICARE

## 2021-12-01 PROCEDURE — G0299 HHS/HOSPICE OF RN EA 15 MIN: HCPCS

## 2021-12-01 PROCEDURE — 3331090002 HH PPS REVENUE DEBIT

## 2021-12-01 PROCEDURE — 3331090001 HH PPS REVENUE CREDIT

## 2021-12-02 VITALS
TEMPERATURE: 97.4 F | DIASTOLIC BLOOD PRESSURE: 78 MMHG | HEART RATE: 78 BPM | RESPIRATION RATE: 16 BRPM | OXYGEN SATURATION: 99 % | SYSTOLIC BLOOD PRESSURE: 122 MMHG

## 2021-12-02 PROCEDURE — 3331090002 HH PPS REVENUE DEBIT

## 2021-12-02 PROCEDURE — 3331090001 HH PPS REVENUE CREDIT

## 2021-12-03 ENCOUNTER — HOME CARE VISIT (OUTPATIENT)
Dept: SCHEDULING | Facility: HOME HEALTH | Age: 76
End: 2021-12-03
Payer: MEDICARE

## 2021-12-03 PROCEDURE — 3331090002 HH PPS REVENUE DEBIT

## 2021-12-03 PROCEDURE — 3331090001 HH PPS REVENUE CREDIT

## 2021-12-03 PROCEDURE — G0299 HHS/HOSPICE OF RN EA 15 MIN: HCPCS

## 2021-12-04 PROCEDURE — 3331090001 HH PPS REVENUE CREDIT

## 2021-12-04 PROCEDURE — 3331090002 HH PPS REVENUE DEBIT

## 2021-12-05 VITALS
HEART RATE: 67 BPM | TEMPERATURE: 97.2 F | RESPIRATION RATE: 15 BRPM | OXYGEN SATURATION: 98 % | DIASTOLIC BLOOD PRESSURE: 68 MMHG | SYSTOLIC BLOOD PRESSURE: 118 MMHG

## 2021-12-05 PROCEDURE — 3331090001 HH PPS REVENUE CREDIT

## 2021-12-05 PROCEDURE — 3331090002 HH PPS REVENUE DEBIT

## 2021-12-06 ENCOUNTER — HOME CARE VISIT (OUTPATIENT)
Dept: SCHEDULING | Facility: HOME HEALTH | Age: 76
End: 2021-12-06
Payer: MEDICARE

## 2021-12-06 PROCEDURE — G0299 HHS/HOSPICE OF RN EA 15 MIN: HCPCS

## 2021-12-06 PROCEDURE — 3331090001 HH PPS REVENUE CREDIT

## 2021-12-06 PROCEDURE — 3331090002 HH PPS REVENUE DEBIT

## 2021-12-07 ENCOUNTER — HOSPITAL ENCOUNTER (OUTPATIENT)
Dept: INTERVENTIONAL RADIOLOGY/VASCULAR | Age: 76
Discharge: HOME | End: 2021-12-07
Attending: RADIOLOGY
Payer: MEDICARE

## 2021-12-07 VITALS
HEIGHT: 68 IN | SYSTOLIC BLOOD PRESSURE: 137 MMHG | WEIGHT: 130 LBS | OXYGEN SATURATION: 95 % | TEMPERATURE: 97.5 F | BODY MASS INDEX: 19.7 KG/M2 | HEART RATE: 69 BPM | RESPIRATION RATE: 16 BRPM | DIASTOLIC BLOOD PRESSURE: 81 MMHG

## 2021-12-07 VITALS
SYSTOLIC BLOOD PRESSURE: 142 MMHG | RESPIRATION RATE: 16 BRPM | DIASTOLIC BLOOD PRESSURE: 86 MMHG | OXYGEN SATURATION: 99 % | HEART RATE: 64 BPM | TEMPERATURE: 97.6 F

## 2021-12-07 DIAGNOSIS — N13.5 URETERAL OBSTRUCTION, RIGHT: ICD-10-CM

## 2021-12-07 PROCEDURE — C2617 STENT, NON-COR, TEM W/O DEL: HCPCS

## 2021-12-07 PROCEDURE — 77030004566 HC CATH ANGI DX TORCON COOK -B

## 2021-12-07 PROCEDURE — 77030025702 HC BG URIN DRN MRTM -A

## 2021-12-07 PROCEDURE — C1894 INTRO/SHEATH, NON-LASER: HCPCS

## 2021-12-07 PROCEDURE — 74011000250 HC RX REV CODE- 250: Performed by: RADIOLOGY

## 2021-12-07 PROCEDURE — C1769 GUIDE WIRE: HCPCS

## 2021-12-07 PROCEDURE — 74011250636 HC RX REV CODE- 250/636: Performed by: RADIOLOGY

## 2021-12-07 PROCEDURE — 3331090002 HH PPS REVENUE DEBIT

## 2021-12-07 PROCEDURE — 3331090001 HH PPS REVENUE CREDIT

## 2021-12-07 PROCEDURE — 77030002916 HC SUT ETHLN J&J -A

## 2021-12-07 PROCEDURE — 74011000636 HC RX REV CODE- 636: Performed by: RADIOLOGY

## 2021-12-07 PROCEDURE — C1729 CATH, DRAINAGE: HCPCS

## 2021-12-07 PROCEDURE — 50693 PLMT URETERAL STENT PRQ: CPT

## 2021-12-07 RX ORDER — SODIUM CHLORIDE 9 MG/ML
25 INJECTION, SOLUTION INTRAVENOUS CONTINUOUS
Status: DISCONTINUED | OUTPATIENT
Start: 2021-12-07 | End: 2021-12-11 | Stop reason: HOSPADM

## 2021-12-07 RX ORDER — FENTANYL CITRATE 50 UG/ML
12.5-1 INJECTION, SOLUTION INTRAMUSCULAR; INTRAVENOUS
Status: DISCONTINUED | OUTPATIENT
Start: 2021-12-07 | End: 2021-12-11 | Stop reason: HOSPADM

## 2021-12-07 RX ORDER — MIDAZOLAM HYDROCHLORIDE 1 MG/ML
.25-2 INJECTION, SOLUTION INTRAMUSCULAR; INTRAVENOUS
Status: DISCONTINUED | OUTPATIENT
Start: 2021-12-07 | End: 2021-12-11 | Stop reason: HOSPADM

## 2021-12-07 RX ORDER — CIPROFLOXACIN 2 MG/ML
400 INJECTION, SOLUTION INTRAVENOUS ONCE
Status: COMPLETED | OUTPATIENT
Start: 2021-12-07 | End: 2021-12-07

## 2021-12-07 RX ORDER — LIDOCAINE HYDROCHLORIDE 20 MG/ML
40-120 INJECTION, SOLUTION INFILTRATION; PERINEURAL ONCE
Status: COMPLETED | OUTPATIENT
Start: 2021-12-07 | End: 2021-12-07

## 2021-12-07 RX ADMIN — MIDAZOLAM 1 MG: 1 INJECTION INTRAMUSCULAR; INTRAVENOUS at 08:10

## 2021-12-07 RX ADMIN — FENTANYL CITRATE 50 MCG: 50 INJECTION INTRAMUSCULAR; INTRAVENOUS at 08:10

## 2021-12-07 RX ADMIN — IOPAMIDOL 18 ML: 612 INJECTION, SOLUTION INTRAVENOUS at 08:29

## 2021-12-07 RX ADMIN — FENTANYL CITRATE 50 MCG: 50 INJECTION INTRAMUSCULAR; INTRAVENOUS at 08:19

## 2021-12-07 RX ADMIN — MIDAZOLAM 1 MG: 1 INJECTION INTRAMUSCULAR; INTRAVENOUS at 08:19

## 2021-12-07 RX ADMIN — SODIUM CHLORIDE 25 ML/HR: 9 INJECTION, SOLUTION INTRAVENOUS at 08:07

## 2021-12-07 RX ADMIN — LIDOCAINE HYDROCHLORIDE 100 MG: 20 INJECTION, SOLUTION INFILTRATION; PERINEURAL at 08:12

## 2021-12-07 RX ADMIN — CIPROFLOXACIN 400 MG: 2 INJECTION, SOLUTION INTRAVENOUS at 08:07

## 2021-12-07 NOTE — PROCEDURES
Department of Interventional Radiology  [phone]        Interventional Radiology Brief Procedure Note    Patient: Kuldip Muir MRN: [id number]  SSN: [id number]    YOB: 1945  Age: 64 y.o. Sex: male      Date of Procedure: 12/7/2021    Pre-Procedure Diagnosis: ureteral obstruction    Post-Procedure Diagnosis: SAME    Procedure(s): ureteral stent    Brief Description of Procedure: as above    Performed By: Romy Santillan MD     Assistants: None    Anesthesia:Moderate Sedation    Estimated Blood Loss: Less than 10ml    Specimens:  None    Implants:  Ureteral Stent    Findings: occluded right ureter successfully crossed. Stent placed.   Neph tube left and capped    Complications: None    Recommendations: leave neph tube clamped     Follow Up: 3 days for check and hopefully pull    Signed By: Romy Santillan MD     December 7, 2021

## 2021-12-07 NOTE — DISCHARGE INSTRUCTIONS
[address] [facility]  Department of Interventional Radiology  [facility]  [phone] Office  [phone] Fax    Nephrostomy Tube Discharge Instructions    General Information:   A nephrostomy is a tube inserted through your skin and into the kidney. The purpose of the tube is to drain urine outside of the body. This is done in the event fo a block or a damaged ureter. Most of these tubes are usually changed every 2-3 months. However, some patients may need to have their tubes changed more often. Home Care Instructions: You can resume your regular diet. Do not drink alcohol, drive, or make any important legal decisions in the next 24 hours. Do not lift anything heavier than a gallon of milk or do anything strenuous for the next 24 hours. You should not shower for 24 hours. You should not bathe or swim while you have this drain in place. Your doctor may arrange for home health to visit you to help take care of the tube. You should clean the tube and change the dressing at least every 48 hours and more as needed. Keep the dressing clean and dry. Keep up with how much drainage you get from the tube and report this to your doctor. Call If:   You should call your Physician and/or the Radiology Nurse if you have any bleeding other than a small spot on your bandage. Call if you have any signs of infection, fever, or increased pain at the site of the tube. Call if you should have leakage around the tube, a change in the appearance of the urine draining from the tube, increased pain in the lower back, or no drainage from the tube for 12 hours. Call if the tube has pulled back, or has been pulled out. Follow-Up Instructions:  Please see your ordering doctor as he/she has requested. To Reach Us: If you have any questions about your procedure, please call the Interventional Radiology department at [phone].  After business hours (5pm) and weekends, call the answering service at [phone] and ask for the Radiologist on call to be paged. Si tiene Preguntas acerca del procedimiento, por favor llame al departamento de Radiología Intervencional al [phone]. Después de horas de oficina (5 pm) y los fines de [place], llamar al Bishop Sal Stafford al [phone] y pregunte por el Radiologo de Tamra Otero. Interventional Radiology General Nurse Discharge    After general anesthesia or intravenous sedation, for 24 hours or while taking prescription Narcotics:  · Limit your activities  · Do not drive and operate hazardous machinery  · Do not make important personal or business decisions  · Do  not drink alcoholic beverages  · If you have not urinated within 8 hours after discharge, please contact your surgeon on call. * Please give a list of your current medications to your Primary Care Provider. * Please update this list whenever your medications are discontinued, doses are     changed, or new medications (including over-the-counter products) are added. * Please carry medication information at all times in case of emergency situations. These are general instructions for a healthy lifestyle:    No smoking/ No tobacco products/ Avoid exposure to second hand smoke  Surgeon General's Warning:  Quitting smoking now greatly reduces serious risk to your health. Obesity, smoking, and sedentary lifestyle greatly increases your risk for illness  A healthy diet, regular physical exercise & weight monitoring are important for maintaining a healthy lifestyle    You may be retaining fluid if you have a history of heart failure or if you experience any of the following symptoms:  Weight gain of 3 pounds or more overnight or 5 pounds in a week, increased swelling in our hands or feet or shortness of breath while lying flat in bed.   Please call your doctor as soon as you notice any of these symptoms; do not wait until your next office visit. Recognize signs and symptoms of STROKE:  F-face looks uneven    A-arms unable to move or move unevenly    S-speech slurred or non-existent    T-time-call 911 as soon as signs and symptoms begin-DO NOT go       Back to bed or wait to see if you get better-TIME IS BRAIN.       Patient Signature:  Date: 12/7/2021  Discharging Nurse: Navneet Mart RN

## 2021-12-07 NOTE — H&P
Department of Interventional Radiology  [phone]    History and Physical    Patient:  Stefani Elias MRN:  [id number]  SSN:  [id number]    YOB: 1945  Age:  64 y.o. Sex:  male      Primary Care Provider:  Lizbeth Sandoval DO  Referring Physician:  Charisse Herr MD    Subjective:     Chief Complaint: ureteral obstruction    History of the Present Illness: The patient is a 64 y.o. male with hx bladder leak, colon cancer with colostomy who presents for nephrostogram, possible interalization of his right nephrostomy tube. NPO.         Past Medical History:   Diagnosis Date    Acute deep vein thrombosis (DVT) of non-extremity vein 5/20/2019    Family history of malignant neoplasm of gastrointestinal tract     mother colon/ovarian cancer 67    Fecal incontinence     LAR syndrome    Former cigarette smoker     History of rectal cancer     Hypothyroid     stable w/med    Infection and inflammatory reaction due to other internal prosthetic devices, implants and grafts, subsequent encounter 2017    abd wound/mesh colostomy    Insomnia     takes meds    Major depression     Osteoarthritis, hand     Personal history of colonic polyps 9/2013    x 1    Personal history of malignant neoplasm of rectum, rectosigmoid junction, and anus 9/2013    Psychiatric disorder     anxiety     Past Surgical History:   Procedure Laterality Date    COLONOSCOPY N/A 2/12/2018    COLONOSCOPY / BMI=21 performed by Rosa Wei MD at [address] [facility] N/A 3/5/2021    COLONOSCOPY/BMI 19 performed by Sultana Crump MD at [address] [place] [address]  2/12/2018         COLONOSCOPY THRU STOMA,LESMADY RMVL W/SNARE  3/5/2021         ENDOSCOPY, COLON, DIAGNOSTIC  last 2/3/15    Emily--no polyps--3 year recall    HX COLOSTOMY  5/11/16    HX GI  4/2016    Sacral nerve stimlator lead trial (unsucessful) and removal    HX HERNIA REPAIR  3/2015, 5/2016    HX HERNIA REPAIR and Colostomy in May    HX OTHER SURGICAL  10/7/2013    Emily---endorectal us    HX OTHER SURGICAL Bilateral     cataracts  2017    HX POLYPECTOMY      HX TOTAL COLECTOMY  11/2013    Emily--lap LAR with coloproctostomy, mobilization splenic flexure, diverting loop ileostomy    HX TOTAL COLECTOMY Right 8/2014    for leak    HX VASCULAR ACCESS Left 4/14    single lumen port/subsequently removed    IR CHANGE ABSCESS / CYST CATHETER  5/27/2021    IR CHANGE ABSCESS / CYST CATHETER  6/14/2021    IR CHANGE ABSCESS / CYST CATHETER  7/8/2021    IR CHANGE ABSCESS / CYST CATHETER  8/5/2021    IR EXCHANGE NEPHRO PERC LT SI  10/28/2021    IR EXCHANGE NEPHRO PERC RT SI  11/3/2021    IR INJ ABS CYST VIA CATHETER / TUBE  5/20/2021    IR INJ ABS CYST VIA CATHETER / TUBE  6/4/2021    IR INJ ABS CYST VIA CATHETER / TUBE  11/24/2021    IR INJ NEPHRO/URETEROGRAM LT EXISTING ACCESS SI  10/4/2021    IR INSERT TUNL CVC W/O PORT OVER 5 YR  5/20/2019    IR NEPHROSTOMY PERC LT PLC CATH  SI  9/16/2021    IR REPLACE CVC TUNNELED W/O PORT  5/30/2019        Review of Systems:    Pertinent items are noted in the History of Present Illness. Prior to Admission medications    Medication Sig Start Date End Date Taking? Authorizing Provider   oxybutynin chloride XL (DITROPAN XL) 10 mg CR tablet Take 1 Tablet by mouth daily. 11/1/21  Yes Yelena Valdes MD   carvediloL (COREG) 3.125 mg tablet Take 3.125 mg by mouth two (2) times daily (with meals). Yes Provider, Historical   Lactobacillus Acidoph & Bulgar (FLORANEX) 1 million cell tab tablet Take 2 Tablets by mouth two (2) times a day. Yes Provider, Historical   ascorbic acid, vitamin C, (VITAMIN C) 500 mg tablet Take 500 mg by mouth two (2) times a day. Yes Provider, Historical   cyanocobalamin (VITAMIN B12) 500 mcg tablet Take 500 mcg by mouth two (2) times a day.    Yes Provider, Historical   levothyroxine (SYNTHROID) 125 mcg tablet TAKE ONE TABLET BY MOUTH ONE TIME DAILY BEFORE BREAKFAST 12/10/20  Yes Roger Hedrick, DO   amitriptyline (ELAVIL) 100 mg tablet Take 1 Tab by mouth nightly. Dr Bulmaro El psych  Indications: takes for sleep 19  Yes Brian Molina MD   OLANZapine THE PAVILIION) 2.5 mg tablet Take 7.5 mg by mouth nightly. Indications: additional medications to treat depression, Takes 3 x 2.5 mg tabs   Yes Provider, Historical   Cholecalciferol, Vitamin D3, (VITAMIN D3) 2,000 unit cap capsule Take 2,000 Units by mouth two (2) times a day. 10/15/18  Yes Roger Álvarez,    hyoscyamine SL (Levsin/SL) 0.125 mg SL tablet 1 Tablet by SubLINGual route every four (4) hours as needed for Cramping or PRN Reason (Other) (Bladder spasms). 21   Kathy Kirk MD   ibuprofen (MOTRIN) 600 mg tablet Take 600 mg by mouth every six (6) hours as needed for Pain. Provider, Historical   clonazePAM (KlonoPIN) 2 mg tablet Take 2 Tablets by mouth nightly. Max Daily Amount: 4 mg. Indications: takes for sleep 21   Young PEDRAZA NP   acetaminophen (TYLENOL) 500 mg tablet Take 500 mg by mouth every six (6) hours as needed for Pain.     Provider, Historical        No Known Allergies    Family History   Problem Relation Age of Onset    Cancer Mother         colon and ovarian    Cancer Brother         prostate    Alcohol abuse Brother     Cancer Maternal Grandmother         bone    Alcohol abuse Father     Alcohol abuse Sister     Stroke Paternal Grandfather      Social History     Tobacco Use    Smoking status: Former Smoker     Packs/day: 1.00     Years: 4.00     Pack years: 4.00     Types: Cigarettes     Quit date: 1963     Years since quittin.0    Smokeless tobacco: Never Used   Substance Use Topics    Alcohol use: Not Currently     Alcohol/week: 11.7 standard drinks     Types: 14 Cans of beer per week     Comment: advised stop drinking given issues        Objective:       Physical Examination:    Vitals:    21 0714 21 0726   BP: (!) 148/75    Pulse: 62    Resp: 18    Temp: 97.5 °F (36.4 °C)    SpO2: 95%    Weight:  59 kg (130 lb)   Height:  5' 8\" (1.727 m)       Pain Assessment  Pain Intensity 1: 0 (12/07/21 0724)        Patient Stated Pain Goal: 0      HEART: regular rate and rhythm  LUNG: clear to auscultation bilaterally  ABDOMEN: normal findings: soft, nontender, clear urine in bag  EXTREMITIES: warm, no edema    Laboratory:     Lab Results   Component Value Date/Time    Sodium 138 12/03/2021 08:09 AM    Sodium 140 09/26/2021 03:35 PM    Potassium 4.6 12/03/2021 08:09 AM    Potassium 4.1 09/26/2021 03:35 PM    Chloride 101 12/03/2021 08:09 AM    Chloride 106 09/26/2021 03:35 PM    CO2 23 12/03/2021 08:09 AM    CO2 30 09/26/2021 03:35 PM    Anion gap 4 (L) 09/26/2021 03:35 PM    Anion gap 5 (L) 09/23/2021 09:40 AM    Glucose 99 12/03/2021 08:09 AM    Glucose 88 09/26/2021 03:35 PM    BUN 37 (H) 12/03/2021 08:09 AM    BUN 25 (H) 09/26/2021 03:35 PM    Creatinine 1.44 (H) 12/03/2021 08:09 AM    Creatinine 1.29 09/26/2021 03:35 PM    GFR est AA 53 (L) 12/03/2021 08:09 AM    GFR est AA >60 09/26/2021 03:35 PM    GFR est non-AA 46 (L) 12/03/2021 08:09 AM    GFR est non-AA 57 (L) 09/26/2021 03:35 PM    Calcium 9.4 12/03/2021 08:09 AM    Calcium 8.9 09/26/2021 03:35 PM    Magnesium 3.1 (H) 09/15/2021 06:09 AM    Magnesium 1.7 (L) 08/27/2021 11:56 AM    Phosphorus 4.5 (H) 06/19/2019 05:17 AM    Phosphorus 3.2 06/17/2019 05:53 AM    Albumin 4.3 12/03/2021 08:09 AM    Albumin 2.4 (L) 09/26/2021 03:35 PM    Protein, total 7.3 12/03/2021 08:09 AM    Protein, total 6.9 09/26/2021 03:35 PM    Globulin 4.5 (H) 09/26/2021 03:35 PM    Globulin 4.8 (H) 09/15/2021 06:09 AM    A-G Ratio 1.4 12/03/2021 08:09 AM    A-G Ratio 0.5 (L) 09/26/2021 03:35 PM    ALT (SGPT) 19 12/03/2021 08:09 AM    ALT (SGPT) 26 09/26/2021 03:35 PM     Lab Results   Component Value Date/Time    WBC 6.4 12/03/2021 08:09 AM    WBC 6.6 09/26/2021 03:35 PM    HGB 12.6 (L) 12/03/2021 08:09 AM    HGB 8.4 (L) 09/26/2021 03:35 PM    HCT 40.5 12/03/2021 08:09 AM    HCT 27.8 (L) 09/26/2021 03:35 PM    PLATELET 145 63/82/9107 08:09 AM    PLATELET 575 (H) 39/68/4741 03:35 PM     Lab Results   Component Value Date/Time    aPTT 28.1 08/27/2021 04:30 PM    aPTT 32.5 08/27/2021 01:30 PM    Prothrombin time 13.7 09/15/2021 07:45 PM    Prothrombin time 16.3 (H) 08/27/2021 04:30 PM    INR 1.0 09/15/2021 07:45 PM    INR 1.3 08/27/2021 04:30 PM       Assessment:     Ureteral obstruction    Hospital Problems  Date Reviewed: 11/22/2021    None          Plan:     Planned Procedure:  Nephrostogram, possible stent placement    Risks, benefits, and alternatives reviewed with patient and he agrees to proceed with the procedure.       Signed By: Galileo Reese PA-C     December 7, 2021

## 2021-12-07 NOTE — PROGRESS NOTES
Recovery period without difficulty. Pt alert and oriented and denies pain. Dressing is clean, dry, and intact. Reviewed discharge instructions with patient, verbalized understanding. Pt escorted to lobby discharge area via wheelchair. Vital signs and Benjy score completed.

## 2021-12-08 ENCOUNTER — HOME CARE VISIT (OUTPATIENT)
Dept: SCHEDULING | Facility: HOME HEALTH | Age: 76
End: 2021-12-08
Payer: MEDICARE

## 2021-12-08 PROCEDURE — G0299 HHS/HOSPICE OF RN EA 15 MIN: HCPCS

## 2021-12-08 PROCEDURE — 3331090001 HH PPS REVENUE CREDIT

## 2021-12-08 PROCEDURE — 3331090002 HH PPS REVENUE DEBIT

## 2021-12-09 VITALS
RESPIRATION RATE: 20 BRPM | SYSTOLIC BLOOD PRESSURE: 116 MMHG | OXYGEN SATURATION: 98 % | HEART RATE: 72 BPM | TEMPERATURE: 97.9 F | DIASTOLIC BLOOD PRESSURE: 68 MMHG

## 2021-12-09 PROCEDURE — 3331090001 HH PPS REVENUE CREDIT

## 2021-12-09 PROCEDURE — 3331090002 HH PPS REVENUE DEBIT

## 2021-12-10 ENCOUNTER — HOSPITAL ENCOUNTER (OUTPATIENT)
Dept: INTERVENTIONAL RADIOLOGY/VASCULAR | Age: 76
Discharge: HOME | End: 2021-12-10
Attending: RADIOLOGY
Payer: MEDICARE

## 2021-12-10 VITALS
SYSTOLIC BLOOD PRESSURE: 157 MMHG | RESPIRATION RATE: 16 BRPM | DIASTOLIC BLOOD PRESSURE: 85 MMHG | HEART RATE: 62 BPM | OXYGEN SATURATION: 99 % | TEMPERATURE: 97.7 F

## 2021-12-10 DIAGNOSIS — N20.0 STONE, KIDNEY: ICD-10-CM

## 2021-12-10 PROCEDURE — 50431 NJX PX NFROSGRM &/URTRGRM: CPT

## 2021-12-10 PROCEDURE — 3331090001 HH PPS REVENUE CREDIT

## 2021-12-10 PROCEDURE — C1769 GUIDE WIRE: HCPCS

## 2021-12-10 PROCEDURE — 50389 REMOVE RENAL TUBE W/FLUORO: CPT

## 2021-12-10 PROCEDURE — 74011000636 HC RX REV CODE- 636: Performed by: RADIOLOGY

## 2021-12-10 PROCEDURE — 3331090002 HH PPS REVENUE DEBIT

## 2021-12-10 RX ORDER — LIDOCAINE HYDROCHLORIDE 20 MG/ML
1-10 INJECTION, SOLUTION INFILTRATION; PERINEURAL
Status: DISCONTINUED | OUTPATIENT
Start: 2021-12-10 | End: 2021-12-14 | Stop reason: HOSPADM

## 2021-12-10 RX ADMIN — IOPAMIDOL 10 ML: 612 INJECTION, SOLUTION INTRAVENOUS at 14:05

## 2021-12-10 NOTE — PROCEDURES
Department of Interventional Radiology  [phone]        Interventional Radiology Brief Procedure Note    Patient: Radha Both MRN: [id number]  SSN: [id number]    YOB: 1945  Age: 64 y.o. Sex: male      Date of Procedure: 12/10/2021    Pre-Procedure Diagnosis: Ureteral stricture    Post-Procedure Diagnosis: SAME    Procedure(s): Antegrade nephrostogram    Brief Description of Procedure: Right antegrade nephrostogram without hydronephrosis. Ureteral stent patent. Right nephrostomy removed.      Performed By: Shannon Rosen MD     Assistants: None    Anesthesia:None    Estimated Blood Loss: None    Specimens:  None    Implants:  None    Findings: As above    Complications: None    Signed By: Shannon Rosen MD     December 10, 2021

## 2021-12-10 NOTE — DISCHARGE INSTRUCTIONS
David [address]  Department of Interventional Radiology  [facility]  [phone] Office  [phone] Fax    DRAIN/PORT/CATHETER REMOVAL  DISCHARGE INSTRUCTIONS    General Information:     Your doctor has ordered for us to remove your drain, port, or catheter. This could be that you do not need it anymore, it is not doing its job, your physician has decided on another plan for your treatment and/or it may need replacing. Home Care Instructions: You can resume your regular diet and medication regimen. Do not drink alcohol, drive, or make any important legal decisions in the next 24 hours. Do not lift anything heavier than a gallon of milk, or do anything strenuous for the next 24 hours. You will notice a dressing over the site of the removal. This dressing should stay in place until the site is healed. The dressing should be changed at least every 48 hours. You should change the dressing sooner if it becomes soiled or wet. The nurse who discharges you to home should review with you any wound care instructions. Resume your normal level of activity slowly. You may shower after 24 hours, but do not take a bath, swim or immerse yourself in water until the site has healed, and keep the dressing dry with plastic wrap while showering. The site may ooze for a couple of days. This drainage should lessen with each passing day. Call If:     You should call your Physician and/or the Radiology Nurse if you have any bleeding other than a small spot on your bandage. Call if you have any signs of infection, fever, or increased pain at the site of the tube. Call if the oozing increases, if it changes color, or begins to have an odor. Follow-Up Instructions: Please see your ordering doctor as he/she has requested. To Reach Us: If you have any questions about your procedure, please call the Interventional Radiology department at [phone].  After business hours (5pm) and weekends, call the answering service at [phone] and ask for the Radiologist on call to be paged. Si tiene Preguntas acerca del procedimiento, por favor llame al departamento de Radiología Intervencional al [phone]. Después de horas de oficina (5 pm) y los fines de [place], llamar al Yobany Stafford al [phone] y pregunte por el Radiologo de [facility]. Interventional Radiology General Nurse Discharge    After general anesthesia or intravenous sedation, for 24 hours or while taking prescription Narcotics:  · Limit your activities  · Do not drive and operate hazardous machinery  · Do not make important personal or business decisions  · Do  not drink alcoholic beverages  · If you have not urinated within 8 hours after discharge, please contact your surgeon on call. * Please give a list of your current medications to your Primary Care Provider. * Please update this list whenever your medications are discontinued, doses are     changed, or new medications (including over-the-counter products) are added. * Please carry medication information at all times in case of emergency situations. These are general instructions for a healthy lifestyle:    No smoking/ No tobacco products/ Avoid exposure to second hand smoke  Surgeon General's Warning:  Quitting smoking now greatly reduces serious risk to your health. Obesity, smoking, and sedentary lifestyle greatly increases your risk for illness  A healthy diet, regular physical exercise & weight monitoring are important for maintaining a healthy lifestyle    You may be retaining fluid if you have a history of heart failure or if you experience any of the following symptoms:  Weight gain of 3 pounds or more overnight or 5 pounds in a week, increased swelling in our hands or feet or shortness of breath while lying flat in bed.   Please call your doctor as soon as you notice any of these symptoms; do not wait until your next office visit. Recognize signs and symptoms of STROKE:  F-face looks uneven    A-arms unable to move or move unevenly    S-speech slurred or non-existent    T-time-call 911 as soon as signs and symptoms begin-DO NOT go       Back to bed or wait to see if you get better-TIME IS BRAIN.

## 2021-12-11 ENCOUNTER — HOME CARE VISIT (OUTPATIENT)
Dept: SCHEDULING | Facility: HOME HEALTH | Age: 76
End: 2021-12-11
Payer: MEDICARE

## 2021-12-11 PROCEDURE — 3331090002 HH PPS REVENUE DEBIT

## 2021-12-11 PROCEDURE — G0299 HHS/HOSPICE OF RN EA 15 MIN: HCPCS

## 2021-12-11 PROCEDURE — 3331090001 HH PPS REVENUE CREDIT

## 2021-12-12 VITALS
RESPIRATION RATE: 16 BRPM | SYSTOLIC BLOOD PRESSURE: 124 MMHG | DIASTOLIC BLOOD PRESSURE: 68 MMHG | HEART RATE: 82 BPM | OXYGEN SATURATION: 98 % | TEMPERATURE: 97.2 F

## 2021-12-12 PROCEDURE — 3331090001 HH PPS REVENUE CREDIT

## 2021-12-12 PROCEDURE — 3331090002 HH PPS REVENUE DEBIT

## 2021-12-13 PROCEDURE — 400018 HH-NO PAY CLAIM PROCEDURE

## 2021-12-13 PROCEDURE — 3331090002 HH PPS REVENUE DEBIT

## 2021-12-13 PROCEDURE — 3331090001 HH PPS REVENUE CREDIT

## 2021-12-14 PROCEDURE — 3331090002 HH PPS REVENUE DEBIT

## 2021-12-14 PROCEDURE — 3331090001 HH PPS REVENUE CREDIT

## 2021-12-15 ENCOUNTER — HOME CARE VISIT (OUTPATIENT)
Dept: SCHEDULING | Facility: HOME HEALTH | Age: 76
End: 2021-12-15
Payer: MEDICARE

## 2021-12-15 PROCEDURE — 400014 HH F/U

## 2021-12-15 PROCEDURE — G0299 HHS/HOSPICE OF RN EA 15 MIN: HCPCS

## 2021-12-15 PROCEDURE — 3331090001 HH PPS REVENUE CREDIT

## 2021-12-15 PROCEDURE — 3331090002 HH PPS REVENUE DEBIT

## 2021-12-16 VITALS
RESPIRATION RATE: 16 BRPM | HEART RATE: 64 BPM | TEMPERATURE: 97.1 F | DIASTOLIC BLOOD PRESSURE: 64 MMHG | SYSTOLIC BLOOD PRESSURE: 122 MMHG | OXYGEN SATURATION: 98 %

## 2021-12-16 PROCEDURE — 3331090001 HH PPS REVENUE CREDIT

## 2021-12-16 PROCEDURE — 3331090002 HH PPS REVENUE DEBIT

## 2021-12-17 ENCOUNTER — HOME CARE VISIT (OUTPATIENT)
Dept: SCHEDULING | Facility: HOME HEALTH | Age: 76
End: 2021-12-17
Payer: MEDICARE

## 2021-12-17 VITALS
SYSTOLIC BLOOD PRESSURE: 126 MMHG | OXYGEN SATURATION: 95 % | DIASTOLIC BLOOD PRESSURE: 76 MMHG | HEART RATE: 76 BPM | TEMPERATURE: 97.8 F | RESPIRATION RATE: 16 BRPM

## 2021-12-17 PROCEDURE — G0299 HHS/HOSPICE OF RN EA 15 MIN: HCPCS

## 2021-12-17 PROCEDURE — 3331090001 HH PPS REVENUE CREDIT

## 2021-12-17 PROCEDURE — 3331090002 HH PPS REVENUE DEBIT

## 2021-12-18 PROCEDURE — 3331090002 HH PPS REVENUE DEBIT

## 2021-12-18 PROCEDURE — 3331090001 HH PPS REVENUE CREDIT

## 2021-12-19 PROCEDURE — 3331090001 HH PPS REVENUE CREDIT

## 2021-12-19 PROCEDURE — 3331090002 HH PPS REVENUE DEBIT

## 2021-12-20 ENCOUNTER — HOME CARE VISIT (OUTPATIENT)
Dept: SCHEDULING | Facility: HOME HEALTH | Age: 76
End: 2021-12-20
Payer: MEDICARE

## 2021-12-20 VITALS
RESPIRATION RATE: 16 BRPM | DIASTOLIC BLOOD PRESSURE: 60 MMHG | SYSTOLIC BLOOD PRESSURE: 108 MMHG | TEMPERATURE: 96.4 F | OXYGEN SATURATION: 100 % | HEART RATE: 58 BPM

## 2021-12-20 PROCEDURE — 3331090002 HH PPS REVENUE DEBIT

## 2021-12-20 PROCEDURE — 3331090001 HH PPS REVENUE CREDIT

## 2021-12-20 PROCEDURE — G0299 HHS/HOSPICE OF RN EA 15 MIN: HCPCS

## 2021-12-21 PROCEDURE — 3331090002 HH PPS REVENUE DEBIT

## 2021-12-21 PROCEDURE — 3331090001 HH PPS REVENUE CREDIT

## 2021-12-22 ENCOUNTER — HOME CARE VISIT (OUTPATIENT)
Dept: SCHEDULING | Facility: HOME HEALTH | Age: 76
End: 2021-12-22
Payer: MEDICARE

## 2021-12-22 PROCEDURE — 3331090002 HH PPS REVENUE DEBIT

## 2021-12-22 PROCEDURE — G0299 HHS/HOSPICE OF RN EA 15 MIN: HCPCS

## 2021-12-22 PROCEDURE — 3331090001 HH PPS REVENUE CREDIT

## 2021-12-23 VITALS
TEMPERATURE: 96.8 F | HEART RATE: 68 BPM | RESPIRATION RATE: 16 BRPM | OXYGEN SATURATION: 95 % | DIASTOLIC BLOOD PRESSURE: 64 MMHG | SYSTOLIC BLOOD PRESSURE: 118 MMHG

## 2022-02-23 ENCOUNTER — HOSPITAL ENCOUNTER (INPATIENT)
Age: 77
LOS: 5 days | Discharge: HOME | DRG: 372 | End: 2022-02-28
Attending: EMERGENCY MEDICINE | Admitting: FAMILY MEDICINE
Payer: MEDICARE

## 2022-02-23 ENCOUNTER — APPOINTMENT (OUTPATIENT)
Dept: ULTRASOUND IMAGING | Age: 77
DRG: 372 | End: 2022-02-23
Attending: FAMILY MEDICINE
Payer: MEDICARE

## 2022-02-23 DIAGNOSIS — N17.9 ACUTE KIDNEY INJURY (HCC): ICD-10-CM

## 2022-02-23 DIAGNOSIS — A04.72 C. DIFFICILE COLITIS: ICD-10-CM

## 2022-02-23 DIAGNOSIS — N30.00 ACUTE CYSTITIS WITHOUT HEMATURIA: ICD-10-CM

## 2022-02-23 DIAGNOSIS — E87.5 ACUTE HYPERKALEMIA: Primary | ICD-10-CM

## 2022-02-23 PROBLEM — N39.0 COMPLICATED UTI (URINARY TRACT INFECTION): Status: ACTIVE | Noted: 2022-02-23

## 2022-02-23 LAB
ALBUMIN SERPL-MCNC: 4.1 G/DL (ref 3.2–4.6)
ALBUMIN/GLOB SERPL: 1 {RATIO} (ref 1.2–3.5)
ALP SERPL-CCNC: 72 U/L (ref 50–136)
ALT SERPL-CCNC: 33 U/L (ref 12–65)
ANION GAP SERPL CALC-SCNC: 3 MMOL/L (ref 7–16)
APPEARANCE UR: ABNORMAL
AST SERPL-CCNC: 27 U/L (ref 15–37)
BACTERIA SPEC CULT: ABNORMAL
BACTERIA SPEC CULT: ABNORMAL
BACTERIA URNS QL MICRO: ABNORMAL /HPF
BASOPHILS # BLD: 0 K/UL (ref 0–0.2)
BASOPHILS NFR BLD: 0 % (ref 0–2)
BILIRUB SERPL-MCNC: 0.4 MG/DL (ref 0.2–1.1)
BILIRUB UR QL: ABNORMAL
BUN SERPL-MCNC: 38 MG/DL (ref 8–23)
CALCIUM SERPL-MCNC: 8.6 MG/DL (ref 8.3–10.4)
CHLORIDE SERPL-SCNC: 108 MMOL/L (ref 98–107)
CO2 SERPL-SCNC: 22 MMOL/L (ref 21–32)
COLOR UR: ABNORMAL
CREAT SERPL-MCNC: 2.1 MG/DL (ref 0.8–1.5)
CRYSTALS URNS QL MICRO: ABNORMAL /LPF
DIFFERENTIAL METHOD BLD: ABNORMAL
EOSINOPHIL # BLD: 0 K/UL (ref 0–0.8)
EOSINOPHIL NFR BLD: 0 % (ref 0.5–7.8)
EPI CELLS #/AREA URNS HPF: ABNORMAL /HPF
ERYTHROCYTE [DISTWIDTH] IN BLOOD BY AUTOMATED COUNT: 14.6 % (ref 11.9–14.6)
GLOBULIN SER CALC-MCNC: 4.2 G/DL (ref 2.3–3.5)
GLUCOSE BLD STRIP.AUTO-MCNC: 103 MG/DL (ref 65–100)
GLUCOSE SERPL-MCNC: 124 MG/DL (ref 65–100)
GLUCOSE UR STRIP.AUTO-MCNC: NEGATIVE MG/DL
HCT VFR BLD AUTO: 48 % (ref 41.1–50.3)
HGB BLD-MCNC: 14.4 G/DL (ref 13.6–17.2)
HGB UR QL STRIP: ABNORMAL
IMM GRANULOCYTES # BLD AUTO: 0 K/UL (ref 0–0.5)
IMM GRANULOCYTES NFR BLD AUTO: 0 % (ref 0–5)
KETONES UR QL STRIP.AUTO: 15 MG/DL
LEUKOCYTE ESTERASE UR QL STRIP.AUTO: ABNORMAL
LYMPHOCYTES # BLD: 0.2 K/UL (ref 0.5–4.6)
LYMPHOCYTES NFR BLD: 2 % (ref 13–44)
MAGNESIUM SERPL-MCNC: 2.4 MG/DL (ref 1.8–2.4)
MCH RBC QN AUTO: 28 PG (ref 26.1–32.9)
MCHC RBC AUTO-ENTMCNC: 30 G/DL (ref 31.4–35)
MCV RBC AUTO: 93.4 FL (ref 79.6–97.8)
MONOCYTES # BLD: 0.4 K/UL (ref 0.1–1.3)
MONOCYTES NFR BLD: 3 % (ref 4–12)
NEUTS SEG # BLD: 10.4 K/UL (ref 1.7–8.2)
NEUTS SEG NFR BLD: 94 % (ref 43–78)
NITRITE UR QL STRIP.AUTO: POSITIVE
NRBC # BLD: 0 K/UL (ref 0–0.2)
OTHER OBSERVATIONS,UCOM: ABNORMAL
PH UR STRIP: 7 [PH] (ref 5–9)
PLATELET # BLD AUTO: 266 K/UL (ref 150–450)
PMV BLD AUTO: 9.7 FL (ref 9.4–12.3)
POTASSIUM SERPL-SCNC: 5 MMOL/L (ref 3.5–5.1)
POTASSIUM SERPL-SCNC: 6.5 MMOL/L (ref 3.5–5.1)
PROCALCITONIN SERPL-MCNC: 0.33 NG/ML (ref 0–0.49)
PROT SERPL-MCNC: 8.3 G/DL (ref 6.3–8.2)
PROT UR STRIP-MCNC: ABNORMAL MG/DL
RBC # BLD AUTO: 5.14 M/UL (ref 4.23–5.6)
RBC #/AREA URNS HPF: >100 /HPF
SERVICE CMNT-IMP: ABNORMAL
SERVICE CMNT-IMP: ABNORMAL
SODIUM SERPL-SCNC: 133 MMOL/L (ref 138–145)
SP GR UR REFRACTOMETRY: 1.02 (ref 1–1.02)
UROBILINOGEN UR QL STRIP.AUTO: 1 EU/DL (ref 0.2–1)
WBC # BLD AUTO: 11.1 K/UL (ref 4.3–11.1)
WBC #/AREA STL HPF: NORMAL /HPF (ref 0–4)
WBC URNS QL MICRO: >100 /HPF

## 2022-02-23 PROCEDURE — 87045 FECES CULTURE AEROBIC BACT: CPT

## 2022-02-23 PROCEDURE — 80053 COMPREHEN METABOLIC PANEL: CPT

## 2022-02-23 PROCEDURE — 99285 EMERGENCY DEPT VISIT HI MDM: CPT

## 2022-02-23 PROCEDURE — 87088 URINE BACTERIA CULTURE: CPT

## 2022-02-23 PROCEDURE — 96365 THER/PROPH/DIAG IV INF INIT: CPT

## 2022-02-23 PROCEDURE — 84145 PROCALCITONIN (PCT): CPT

## 2022-02-23 PROCEDURE — 74011000250 HC RX REV CODE- 250: Performed by: FAMILY MEDICINE

## 2022-02-23 PROCEDURE — 83735 ASSAY OF MAGNESIUM: CPT

## 2022-02-23 PROCEDURE — 93005 ELECTROCARDIOGRAM TRACING: CPT | Performed by: EMERGENCY MEDICINE

## 2022-02-23 PROCEDURE — 74011250636 HC RX REV CODE- 250/636: Performed by: FAMILY MEDICINE

## 2022-02-23 PROCEDURE — 81003 URINALYSIS AUTO W/O SCOPE: CPT

## 2022-02-23 PROCEDURE — 87186 SC STD MICRODIL/AGAR DIL: CPT

## 2022-02-23 PROCEDURE — 96375 TX/PRO/DX INJ NEW DRUG ADDON: CPT

## 2022-02-23 PROCEDURE — 84132 ASSAY OF SERUM POTASSIUM: CPT

## 2022-02-23 PROCEDURE — 87086 URINE CULTURE/COLONY COUNT: CPT

## 2022-02-23 PROCEDURE — 2709999900 HC NON-CHARGEABLE SUPPLY

## 2022-02-23 PROCEDURE — 85025 COMPLETE CBC W/AUTO DIFF WBC: CPT

## 2022-02-23 PROCEDURE — 76775 US EXAM ABDO BACK WALL LIM: CPT

## 2022-02-23 PROCEDURE — 89055 LEUKOCYTE ASSESSMENT FECAL: CPT

## 2022-02-23 PROCEDURE — 74011636637 HC RX REV CODE- 636/637: Performed by: EMERGENCY MEDICINE

## 2022-02-23 PROCEDURE — 74011000258 HC RX REV CODE- 258: Performed by: FAMILY MEDICINE

## 2022-02-23 PROCEDURE — 74011250637 HC RX REV CODE- 250/637: Performed by: FAMILY MEDICINE

## 2022-02-23 PROCEDURE — 74011250636 HC RX REV CODE- 250/636: Performed by: EMERGENCY MEDICINE

## 2022-02-23 PROCEDURE — 87493 C DIFF AMPLIFIED PROBE: CPT

## 2022-02-23 PROCEDURE — 87177 OVA AND PARASITES SMEARS: CPT

## 2022-02-23 PROCEDURE — 82962 GLUCOSE BLOOD TEST: CPT

## 2022-02-23 PROCEDURE — 74011000250 HC RX REV CODE- 250: Performed by: EMERGENCY MEDICINE

## 2022-02-23 PROCEDURE — 65270000029 HC RM PRIVATE

## 2022-02-23 RX ORDER — AMITRIPTYLINE HYDROCHLORIDE 25 MG/1
100 TABLET, FILM COATED ORAL
Status: DISCONTINUED | OUTPATIENT
Start: 2022-02-23 | End: 2022-02-28 | Stop reason: HOSPADM

## 2022-02-23 RX ORDER — CALCIUM GLUCONATE 94 MG/ML
1 INJECTION, SOLUTION INTRAVENOUS ONCE
Status: DISCONTINUED | OUTPATIENT
Start: 2022-02-23 | End: 2022-02-23 | Stop reason: SDUPTHER

## 2022-02-23 RX ORDER — SODIUM BICARBONATE 1 MEQ/ML
50 SYRINGE (ML) INTRAVENOUS
Status: COMPLETED | OUTPATIENT
Start: 2022-02-23 | End: 2022-02-23

## 2022-02-23 RX ORDER — SODIUM CHLORIDE 0.9 % (FLUSH) 0.9 %
5-10 SYRINGE (ML) INJECTION EVERY 8 HOURS
Status: DISCONTINUED | OUTPATIENT
Start: 2022-02-23 | End: 2022-02-28 | Stop reason: HOSPADM

## 2022-02-23 RX ORDER — ACETAMINOPHEN 325 MG/1
650 TABLET ORAL
Status: DISCONTINUED | OUTPATIENT
Start: 2022-02-23 | End: 2022-02-28 | Stop reason: HOSPADM

## 2022-02-23 RX ORDER — SODIUM CHLORIDE 0.9 % (FLUSH) 0.9 %
5-40 SYRINGE (ML) INJECTION AS NEEDED
Status: DISCONTINUED | OUTPATIENT
Start: 2022-02-23 | End: 2022-02-28 | Stop reason: HOSPADM

## 2022-02-23 RX ORDER — CARVEDILOL 3.12 MG/1
6.25 TABLET ORAL 2 TIMES DAILY WITH MEALS
Status: DISCONTINUED | OUTPATIENT
Start: 2022-02-23 | End: 2022-02-28 | Stop reason: HOSPADM

## 2022-02-23 RX ORDER — SODIUM CHLORIDE 0.9 % (FLUSH) 0.9 %
5-40 SYRINGE (ML) INJECTION EVERY 8 HOURS
Status: DISCONTINUED | OUTPATIENT
Start: 2022-02-23 | End: 2022-02-28 | Stop reason: HOSPADM

## 2022-02-23 RX ORDER — ONDANSETRON 2 MG/ML
4 INJECTION INTRAMUSCULAR; INTRAVENOUS
Status: DISCONTINUED | OUTPATIENT
Start: 2022-02-23 | End: 2022-02-28 | Stop reason: HOSPADM

## 2022-02-23 RX ORDER — CALCIUM GLUCONATE 20 MG/ML
1 INJECTION, SOLUTION INTRAVENOUS ONCE
Status: COMPLETED | OUTPATIENT
Start: 2022-02-23 | End: 2022-02-23

## 2022-02-23 RX ORDER — ONDANSETRON 8 MG/1
4 TABLET, ORALLY DISINTEGRATING ORAL
Status: DISCONTINUED | OUTPATIENT
Start: 2022-02-23 | End: 2022-02-28 | Stop reason: HOSPADM

## 2022-02-23 RX ORDER — SODIUM CHLORIDE 9 MG/ML
150 INJECTION, SOLUTION INTRAVENOUS CONTINUOUS
Status: DISCONTINUED | OUTPATIENT
Start: 2022-02-23 | End: 2022-02-25

## 2022-02-23 RX ORDER — DEXTROSE MONOHYDRATE 100 MG/ML
250 INJECTION, SOLUTION INTRAVENOUS ONCE
Status: COMPLETED | OUTPATIENT
Start: 2022-02-23 | End: 2022-02-23

## 2022-02-23 RX ORDER — POLYETHYLENE GLYCOL 3350 17 G/17G
17 POWDER, FOR SOLUTION ORAL DAILY PRN
Status: DISCONTINUED | OUTPATIENT
Start: 2022-02-23 | End: 2022-02-28 | Stop reason: HOSPADM

## 2022-02-23 RX ORDER — SODIUM CHLORIDE 0.9 % (FLUSH) 0.9 %
5-10 SYRINGE (ML) INJECTION AS NEEDED
Status: DISCONTINUED | OUTPATIENT
Start: 2022-02-23 | End: 2022-02-28 | Stop reason: HOSPADM

## 2022-02-23 RX ORDER — CLONAZEPAM 1 MG/1
4 TABLET ORAL
Status: DISCONTINUED | OUTPATIENT
Start: 2022-02-23 | End: 2022-02-26

## 2022-02-23 RX ORDER — LEVOFLOXACIN 5 MG/ML
750 INJECTION, SOLUTION INTRAVENOUS ONCE
Status: COMPLETED | OUTPATIENT
Start: 2022-02-23 | End: 2022-02-23

## 2022-02-23 RX ORDER — ACETAMINOPHEN 650 MG/1
650 SUPPOSITORY RECTAL
Status: DISCONTINUED | OUTPATIENT
Start: 2022-02-23 | End: 2022-02-28 | Stop reason: HOSPADM

## 2022-02-23 RX ADMIN — AMITRIPTYLINE HYDROCHLORIDE 100 MG: 50 TABLET, FILM COATED ORAL at 22:34

## 2022-02-23 RX ADMIN — PIPERACILLIN SODIUM,TAZOBACTAM SODIUM 3.38 G: 3; .375 INJECTION, POWDER, FOR SOLUTION INTRAVENOUS at 18:20

## 2022-02-23 RX ADMIN — LEVOFLOXACIN 750 MG: 5 INJECTION, SOLUTION INTRAVENOUS at 13:53

## 2022-02-23 RX ADMIN — CALCIUM GLUCONATE 1000 MG: 20 INJECTION, SOLUTION INTRAVENOUS at 12:26

## 2022-02-23 RX ADMIN — SODIUM CHLORIDE, PRESERVATIVE FREE 10 ML: 5 INJECTION INTRAVENOUS at 12:31

## 2022-02-23 RX ADMIN — INSULIN HUMAN 10 UNITS: 100 INJECTION, SOLUTION PARENTERAL at 12:26

## 2022-02-23 RX ADMIN — VANCOMYCIN HYDROCHLORIDE 125 MG: 5 INJECTION, POWDER, LYOPHILIZED, FOR SOLUTION INTRAVENOUS at 21:53

## 2022-02-23 RX ADMIN — SODIUM CHLORIDE, PRESERVATIVE FREE 10 ML: 5 INJECTION INTRAVENOUS at 21:55

## 2022-02-23 RX ADMIN — SODIUM CHLORIDE 75 ML/HR: 900 INJECTION, SOLUTION INTRAVENOUS at 16:50

## 2022-02-23 RX ADMIN — OLANZAPINE 7.5 MG: 2.5 TABLET, FILM COATED ORAL at 21:53

## 2022-02-23 RX ADMIN — SODIUM CHLORIDE, PRESERVATIVE FREE 10 ML: 5 INJECTION INTRAVENOUS at 21:56

## 2022-02-23 RX ADMIN — SODIUM BICARBONATE 50 MEQ: 84 INJECTION, SOLUTION INTRAVENOUS at 12:26

## 2022-02-23 RX ADMIN — VANCOMYCIN HYDROCHLORIDE 125 MG: 5 INJECTION, POWDER, LYOPHILIZED, FOR SOLUTION INTRAVENOUS at 13:53

## 2022-02-23 RX ADMIN — CLONAZEPAM 4 MG: 1 TABLET ORAL at 21:54

## 2022-02-23 RX ADMIN — DEXTROSE MONOHYDRATE 250 ML: 100 INJECTION, SOLUTION INTRAVENOUS at 12:26

## 2022-02-23 NOTE — H&P
Hospitalist Admission History and Physical     NAME:  Michael Marroquin   Age:  64 y.o.  :   1945   MRN:   [id number]  PCP: Abhishek Gonzalez MD:  Treatment Team: Attending Provider: Benito King DO; Primary Nurse: Linda Kay RN    Chief Complaint   Patient presents with    Diarrhea         HPI:   Patient is a 64 y.o. male who presented to the ED for cc worsening diarrhea. Hx of DVT, rectal cancer s/p colostomy, s/p removal of sigmoid colon, chronic diarrhea, depression, urinary retention with chronic Bishop, complicated UTIs, and multiple abdominal surgeries. Vitals- stable    Labs- UA consistent with UTI, K 6.5. Creatine 2.1 from baseline 1. +for C diff.    Past Medical History:   Diagnosis Date    Acute deep vein thrombosis (DVT) of non-extremity vein 2019    Family history of malignant neoplasm of gastrointestinal tract     mother colon/ovarian cancer 67    Fecal incontinence     LAR syndrome    Former cigarette smoker     History of rectal cancer     Hypothyroid     stable w/med    Infection and inflammatory reaction due to other internal prosthetic devices, implants and grafts, subsequent encounter     abd wound/mesh colostomy    Insomnia     takes meds    Major depression     Osteoarthritis, hand     Personal history of colonic polyps 9/2013    x 1    Personal history of malignant neoplasm of rectum, rectosigmoid junction, and anus 2013    Psychiatric disorder     anxiety        Past Surgical History:   Procedure Laterality Date    COLONOSCOPY N/A 2018    COLONOSCOPY / BMI=21 performed by Renny cM MD at [facility] ENDOSCOPY    COLONOSCOPY N/A 3/5/2021    COLONOSCOPY/BMI 19 performed by Malvin Starks MD at [address] [place] [address]  2018         COLONOSCOPY THRU STOMA,LESN RMVL W/SNARE  3/5/2021         ENDOSCOPY, COLON, DIAGNOSTIC  last 2/3/15    Emily--no polyps--3 year recall    HX COLOSTOMY 16    HX GI  2016    Sacral nerve stimlator lead trial (unsucessful) and removal    HX HERNIA REPAIR  3/2015, 2016    HX HERNIA REPAIR      and Colostomy in May    HX OTHER SURGICAL  10/7/2013    Emily---endorectal us    HX OTHER SURGICAL Bilateral     cataracts  2017    HX POLYPECTOMY      HX TOTAL COLECTOMY  2013    Emily--lap LAR with coloproctostomy, mobilization splenic flexure, diverting loop ileostomy    HX TOTAL COLECTOMY Right 2014    for leak    HX VASCULAR ACCESS Left     single lumen port/subsequently removed    IR CHANGE ABSCESS / CYST CATHETER  2021    IR CHANGE ABSCESS / CYST CATHETER  2021    IR CHANGE ABSCESS / CYST CATHETER  2021    IR CHANGE ABSCESS / CYST CATHETER  2021    IR EXCHANGE NEPHRO PERC LT SI  10/28/2021    IR EXCHANGE NEPHRO PERC RT SI  11/3/2021    IR EXCHANGE NEPHRO PERC RT SI  2021    IR INJ ABS CYST VIA CATHETER / TUBE  2021    IR INJ ABS CYST VIA CATHETER / TUBE  2021    IR INJ ABS CYST VIA CATHETER / TUBE  2021    IR INJ NEPHRO/URETEROGRAM LT EXISTING ACCESS SI  10/4/2021    IR INJ NEPHRO/URETEROGRAM RT EXISTING ACCESS SI  12/10/2021    IR INSERT TUNL CVC W/O PORT OVER 5 YR  2019    IR NEPHROSTOMY PERC LT PLC CATH  SI  2021    IR REPLACE CVC TUNNELED W/O PORT  2019        Family History   Problem Relation Age of Onset    Cancer Mother         colon and ovarian    Cancer Brother         prostate    Alcohol abuse Brother     Cancer Maternal Grandmother         bone    Alcohol abuse Father     Alcohol abuse Sister     Stroke Paternal Grandfather        Social History     Social History Narrative    Not on file        Social History     Tobacco Use    Smoking status: Former Smoker     Packs/day: 1.00     Years: 4.00     Pack years: 4.00     Types: Cigarettes     Quit date: 1963     Years since quittin.3    Smokeless tobacco: Never Used   Substance Use Topics    Alcohol use: Not Currently     Alcohol/week: 11.7 standard drinks     Types: 14 Cans of beer per week     Comment: advised stop drinking given issues        Social History     Substance and Sexual Activity   Drug Use No         No Known Allergies    Prior to Admission medications    Medication Sig Start Date End Date Taking? Authorizing Provider   levothyroxine (SYNTHROID) 125 mcg tablet TAKE ONE TABLET BY MOUTH ONE TIME DAILY BEFORE BREAKFAST 12/10/21   Roger Gandhi, DO   carvediloL (COREG) 3.125 mg tablet Take 2 Tablets by mouth two (2) times daily (with meals). 12/10/21   Sharmila Yun C, DO   ibuprofen (MOTRIN) 600 mg tablet Take 600 mg by mouth every six (6) hours as needed for Pain. Patient not taking: Reported on 12/10/2021    Provider, Historical   clonazePAM (KlonoPIN) 2 mg tablet Take 2 Tablets by mouth nightly. Max Daily Amount: 4 mg. Indications: takes for sleep 9/28/21   Claudia PEDRAZA, MAZIN   ascorbic acid, vitamin C, (VITAMIN C) 500 mg tablet Take 500 mg by mouth two (2) times a day. Provider, Historical   cyanocobalamin (VITAMIN B12) 500 mcg tablet Take 500 mcg by mouth two (2) times a day. Provider, Historical   acetaminophen (TYLENOL) 500 mg tablet Take 500 mg by mouth every six (6) hours as needed for Pain. Provider, Historical   amitriptyline (ELAVIL) 100 mg tablet Take 1 Tab by mouth nightly. Dr Linda Ramirez psych  Indications: takes for sleep 6/17/19   Gladystjonathan Mojica MD   OLANZapine THE PAVILIION) 2.5 mg tablet Take 7.5 mg by mouth nightly. Indications: additional medications to treat depression, Takes 3 x 2.5 mg tabs    Provider, Historical   Cholecalciferol, Vitamin D3, (VITAMIN D3) 2,000 unit cap capsule Take 2,000 Units by mouth two (2) times a day.   Patient not taking: Reported on 12/10/2021 10/15/18   Marcelino Queen DO           Review of Systems    Constitutional: well nourished male in NAD  Eyes:  no change in visual acuity, no photophobia  Ears, nose, mouth, throat, and face: no Odynphagia, dysphagia, no thrush or exudate, negative for chronic sinus congestion, recurrent headaches  Respiratory: negative for SOB, hemoptysis or cough  Cardiovascular: negative for CP, palpitations, or PND  Gastrointestinal: diarrhea. Genitourinary: no urgency, frequency, or dysuria, no nocturia  Integument/breast: negative for skin rash or skin lesions  Hematologic/lymphatic: negative for known bleeding disorder  Musculoskeletal:negative for joint pain or joint tenderness  Neurological: negative for lightheadedness, syncope or presyncopal events, no seizure or CVA history  Behavioral/Psych: negative for depression or chronic anxiety,   Endocrine: negative for polydyspia, polyuria or intolerance to heat or cold  Allergic/Immunologic: negative for chronic allergic rhinitis, or known connective tissue disorder      Objective:     Visit Vitals  /69   Pulse 87   Temp 98.1 °F (36.7 °C)   Resp 20   Ht 5' 8\" (1.727 m)   Wt 59 kg (130 lb)   SpO2 98%   BMI 19.77 kg/m²        No intake/output data recorded. No intake/output data recorded.     Data Review:   Recent Results (from the past 24 hour(s))   MAGNESIUM    Collection Time: 02/23/22 10:00 AM   Result Value Ref Range    Magnesium 2.4 1.8 - 2.4 mg/dL   PROCALCITONIN    Collection Time: 02/23/22 10:00 AM   Result Value Ref Range    Procalcitonin 0.33 0.00 - 0.49 ng/mL   URINALYSIS W/ RFLX MICROSCOPIC    Collection Time: 02/23/22 10:11 AM   Result Value Ref Range    Color RED      Appearance TURBID      Specific gravity 1.023 1.001 - 1.023      pH (UA) 7.0 5.0 - 9.0      Protein GREATER THAN/EQUAL  (A) NEG mg/dL    Glucose Negative mg/dL    Ketone 15 (A) NEG mg/dL    Bilirubin MODERATE (A) NEG      Blood LARGE (A) NEG      Urobilinogen 1.0 0.2 - 1.0 EU/dL    Nitrites Positive (A) NEG      Leukocyte Esterase LARGE (A) NEG      WBC >100 0 /hpf    RBC >100 0 /hpf    Epithelial cells 3-5 0 /hpf    Bacteria 4+ (H) 0 /hpf    Crystals, urine MODERATE 0 /LPF Other observations RESULTS VERIFIED MANUALLY     C. DIFFICILE/EPI PCR    Collection Time: 02/23/22 10:11 AM    Specimen: Stool   Result Value Ref Range    Special Requests: NO SPECIAL REQUESTS      Culture result: Toxigenic C. difficile POSITIVE. (A)      Culture result:        RESULTS VERIFIED, PHONED TO AND READ BACK BY  DR. Karsten Bashir 12:58 [phone] AMM     WBC, STOOL    Collection Time: 02/23/22 10:11 AM   Result Value Ref Range    White blood cells, stool NO WBC'S SEEN 0 - 4 /HPF   CBC WITH AUTOMATED DIFF    Collection Time: 02/23/22 10:20 AM   Result Value Ref Range    WBC 11.1 4.3 - 11.1 K/uL    RBC 5.14 4.23 - 5.6 M/uL    HGB 14.4 13.6 - 17.2 g/dL    HCT 48.0 41.1 - 50.3 %    MCV 93.4 79.6 - 97.8 FL    MCH 28.0 26.1 - 32.9 PG    MCHC 30.0 (L) 31.4 - 35.0 g/dL    RDW 14.6 11.9 - 14.6 %    PLATELET 124 220 - 658 K/uL    MPV 9.7 9.4 - 12.3 FL    ABSOLUTE NRBC 0.00 0.0 - 0.2 K/uL    DF AUTOMATED      NEUTROPHILS 94 (H) 43 - 78 %    LYMPHOCYTES 2 (L) 13 - 44 %    MONOCYTES 3 (L) 4.0 - 12.0 %    EOSINOPHILS 0 (L) 0.5 - 7.8 %    BASOPHILS 0 0.0 - 2.0 %    IMMATURE GRANULOCYTES 0 0.0 - 5.0 %    ABS. NEUTROPHILS 10.4 (H) 1.7 - 8.2 K/UL    ABS. LYMPHOCYTES 0.2 (L) 0.5 - 4.6 K/UL    ABS. MONOCYTES 0.4 0.1 - 1.3 K/UL    ABS. EOSINOPHILS 0.0 0.0 - 0.8 K/UL    ABS. BASOPHILS 0.0 0.0 - 0.2 K/UL    ABS. IMM. GRANS. 0.0 0.0 - 0.5 K/UL   METABOLIC PANEL, COMPREHENSIVE    Collection Time: 02/23/22 10:20 AM   Result Value Ref Range    Sodium 133 (L) 138 - 145 mmol/L    Potassium 6.5 (HH) 3.5 - 5.1 mmol/L    Chloride 108 (H) 98 - 107 mmol/L    CO2 22 21 - 32 mmol/L    Anion gap 3 (L) 7 - 16 mmol/L    Glucose 124 (H) 65 - 100 mg/dL    BUN 38 (H) 8 - 23 MG/DL    Creatinine 2.10 (H) 0.8 - 1.5 MG/DL    GFR est AA 39 (L) >60 ml/min/1.73m2    GFR est non-AA 33 (L) >60 ml/min/1.73m2    Calcium 8.6 8.3 - 10.4 MG/DL    Bilirubin, total 0.4 0.2 - 1.1 MG/DL    ALT (SGPT) 33 12 - 65 U/L    AST (SGOT) 27 15 - 37 U/L    Alk.  phosphatase 72 50 - 136 U/L    Protein, total 8.3 (H) 6.3 - 8.2 g/dL    Albumin 4.1 3.2 - 4.6 g/dL    Globulin 4.2 (H) 2.3 - 3.5 g/dL    A-G Ratio 1.0 (L) 1.2 - 3.5     GLUCOSE, POC    Collection Time: 02/23/22  1:28 PM   Result Value Ref Range    Glucose (POC) 103 (H) 65 - 100 mg/dL    Performed by MARIO ALBERTOAWPARKER        Physical Exam:     General:  Alert, cooperative, no distress, appears stated age. Eyes:  Conjunctivae/corneas clear   Ears:  Normal TMs and external ear canals both ears. Nose: Wearing mask   Mouth/Throat: Wearing mask   Neck:  no JVD. Back:   deferred   Lungs:   Clear to auscultation bilaterally. Heart:  Regular rate and rhythm, S1, S2 normal   Abdomen:   Soft, non-tender. Bowel sounds normal. Colostomy with watery diarrhea in bag   Extremities: Extremities normal, atraumatic, no cyanosis or edema. Pulses: 2+ and symmetric all extremities. Skin: Skin color, texture, turgor normal. No rashes or lesions   Lymph nodes: Cervical, supraclavicular, and axillary nodes normal.   Neurologic: CNII-XII intact. Assessment and Plan     Principal Problem:    Complicated UTI (urinary tract infection) (2/23/2022)    Active Problems:    Hypothyroid (6/14/2019)      Overview: stable w/med      Major depression ()      Anxiety (5/11/2021)      Hyperkalemia (9/15/2021)      C. difficile colitis (2/23/2022)      UTI, hx of Klebsiella, pseudomonas, and e coli. Order Zosyn. Urine culture already ordered by ER to which I appreciate. C diff - Start oral vanc    REBEKAH - intra-renal. Order US renals to ensure no obstruction. Strict Is and Os. Gentle fluids. Tx infections. Hyperkalemia - Ca gluconate, insulin/dextrose ordered in ER. Low K diet. HTN- BB    Anxiety - klonopin that I will reduce to 4mg at night (normally takes 6 mg--this is confirmed with pharmacy) but due to current illness, will reduce dose to avoid oversedation. Zyprexa. Levothyroxine     PPD.  PT. OT    DVT prophylaxis - SCDs due to hx of bleeding.    Signed By: Ariella Lima DO   February 23, 2022

## 2022-02-23 NOTE — ED PROVIDER NOTES
Patient presents to the ER with complaints of worsening diarrhea. Patient reports he has noticed increased stool output from his ostomy since last evening. States is mostly loose and watery. Does report some generalized fatigue. Denies any fevers, chills or vomiting. Denies any cough, runny nose or congestion    The history is provided by the patient. Diarrhea   This is a new problem. The current episode started yesterday. The problem has not changed since onset. The pain is located in the generalized abdominal region. The quality of the pain is aching. The pain is at a severity of 2/10. The pain is mild. Associated symptoms include diarrhea. Pertinent negatives include no fever, no hematochezia, no melena, no vomiting, no hematuria, no headaches, no arthralgias and no back pain. Nothing worsens the pain. The pain is relieved by nothing. Past workup includes CT scan, surgery. The patient's surgical history includes colectomy.        Past Medical History:   Diagnosis Date    Acute deep vein thrombosis (DVT) of non-extremity vein 5/20/2019    Family history of malignant neoplasm of gastrointestinal tract     mother colon/ovarian cancer 67    Fecal incontinence     LAR syndrome    Former cigarette smoker     History of rectal cancer     Hypothyroid     stable w/med    Infection and inflammatory reaction due to other internal prosthetic devices, implants and grafts, subsequent encounter 2017    abd wound/mesh colostomy    Insomnia     takes meds    Major depression     Osteoarthritis, hand     Personal history of colonic polyps 9/2013    x 1    Personal history of malignant neoplasm of rectum, rectosigmoid junction, and anus 9/2013    Psychiatric disorder     anxiety       Past Surgical History:   Procedure Laterality Date    COLONOSCOPY N/A 2/12/2018    COLONOSCOPY / BMI=21 performed by Hans Bowers MD at [place] [address]. N/A 3/5/2021    COLONOSCOPY/BMI 19 performed by Marcy Wei Edson Landeros MD at [address]  2/12/2018         COLONOSCOPY THRU STOMA,LESN RMVL W/SNARE  3/5/2021         ENDOSCOPY, COLON, DIAGNOSTIC  last 2/3/15    Emily--no polyps--3 year recall    HX COLOSTOMY  5/11/16    HX GI  4/2016    Sacral nerve stimlator lead trial (unsucessful) and removal    HX HERNIA REPAIR  3/2015, 5/2016    HX HERNIA REPAIR      and Colostomy in May    HX OTHER SURGICAL  10/7/2013    Emily---endorectal us    HX OTHER SURGICAL Bilateral     cataracts  2017    HX POLYPECTOMY      HX TOTAL COLECTOMY  11/2013    Emily--lap LAR with coloproctostomy, mobilization splenic flexure, diverting loop ileostomy    HX TOTAL COLECTOMY Right 8/2014    for leak    HX VASCULAR ACCESS Left 4/14    single lumen port/subsequently removed    IR CHANGE ABSCESS / CYST CATHETER  5/27/2021    IR CHANGE ABSCESS / CYST CATHETER  6/14/2021    IR CHANGE ABSCESS / CYST CATHETER  7/8/2021    IR CHANGE ABSCESS / CYST CATHETER  8/5/2021    IR EXCHANGE NEPHRO PERC LT SI  10/28/2021    IR EXCHANGE NEPHRO PERC RT SI  11/3/2021    IR EXCHANGE NEPHRO PERC RT SI  12/7/2021    IR INJ ABS CYST VIA CATHETER / TUBE  5/20/2021    IR INJ ABS CYST VIA CATHETER / TUBE  6/4/2021    IR INJ ABS CYST VIA CATHETER / TUBE  11/24/2021    IR INJ NEPHRO/URETEROGRAM LT EXISTING ACCESS SI  10/4/2021    IR INJ NEPHRO/URETEROGRAM RT EXISTING ACCESS SI  12/10/2021    IR INSERT TUNL CVC W/O PORT OVER 5 YR  5/20/2019    IR NEPHROSTOMY PERC LT PLC CATH  SI  9/16/2021    IR REPLACE CVC TUNNELED W/O PORT  5/30/2019         Family History:   Problem Relation Age of Onset    Cancer Mother         colon and ovarian    Cancer Brother         prostate    Alcohol abuse Brother     Cancer Maternal Grandmother         bone    Alcohol abuse Father     Alcohol abuse Sister     Stroke Paternal Grandfather        Social History     Socioeconomic History    Marital status: SINGLE     Spouse name: Not on file    Number of children: Not on file    Years of education: Not on file    Highest education level: Not on file   Occupational History    Not on file   Tobacco Use    Smoking status: Former Smoker     Packs/day: 1.00     Years: 4.00     Pack years: 4.00     Types: Cigarettes     Quit date: 1963     Years since quittin.3    Smokeless tobacco: Never Used   Substance and Sexual Activity    Alcohol use: Not Currently     Alcohol/week: 11.7 standard drinks     Types: 14 Cans of beer per week     Comment: advised stop drinking given issues    Drug use: No    Sexual activity: Never   Other Topics Concern    Not on file   Social History Narrative    Not on file     Social Determinants of Health     Financial Resource Strain:     Difficulty of Paying Living Expenses: Not on file   Food Insecurity:     Worried About Running Out of Food in the Last Year: Not on file    Kristen of Food in the Last Year: Not on file   Transportation Needs:     Lack of Transportation (Medical): Not on file    Lack of Transportation (Non-Medical):  Not on file   Physical Activity:     Days of Exercise per Week: Not on file    Minutes of Exercise per Session: Not on file   Stress:     Feeling of Stress : Not on file   Social Connections:     Frequency of Communication with Friends and Family: Not on file    Frequency of Social Gatherings with Friends and Family: Not on file    Attends [employer] Services: Not on file    Active Member of [address] [place] [address] or Organizations: Not on file    Attends Club or Organization Meetings: Not on file    Marital Status: Not on file   Intimate Partner Violence:     Fear of Current or Ex-Partner: Not on file    Emotionally Abused: Not on file    Physically Abused: Not on file    Sexually Abused: Not on file   Housing Stability:     Unable to Pay for Housing in the Last Year: Not on file    Number of [place] in the Last Year: Not on file    Unstable Housing in the Last Year: Not on file         ALLERGIES: Patient has no known allergies. Review of Systems   Constitutional: Positive for fatigue. Negative for fever and unexpected weight change. HENT: Negative for congestion, dental problem and voice change. Eyes: Negative for photophobia and redness. Respiratory: Negative for choking, chest tightness, shortness of breath and stridor. Cardiovascular: Negative for palpitations and leg swelling. Gastrointestinal: Positive for diarrhea. Negative for hematochezia, melena and vomiting. Endocrine: Negative for polydipsia, polyphagia and polyuria. Genitourinary: Negative for decreased urine volume, hematuria and urgency. Musculoskeletal: Negative for arthralgias, back pain, neck pain and neck stiffness. Skin: Negative for color change and pallor. Neurological: Negative for syncope, speech difficulty, weakness and headaches. Hematological: Negative for adenopathy. Does not bruise/bleed easily. Psychiatric/Behavioral: Negative for confusion and decreased concentration. All other systems reviewed and are negative. Vitals:    02/23/22 0955   BP: 125/85   Resp: 16   Temp: 98.1 °F (36.7 °C)   SpO2: 99%   Weight: 59 kg (130 lb)   Height: 5' 8\" (1.727 m)            Physical Exam  Vitals and nursing note reviewed. Constitutional:       General: He is not in acute distress. Appearance: Normal appearance. He is not ill-appearing. HENT:      Head: Normocephalic and atraumatic. Right Ear: External ear normal.      Left Ear: External ear normal.      Nose: Nose normal. No congestion or rhinorrhea. Mouth/Throat:      Mouth: Mucous membranes are moist.      Pharynx: No oropharyngeal exudate or posterior oropharyngeal erythema. Eyes:      General:         Right eye: No discharge. Left eye: No discharge. Extraocular Movements: Extraocular movements intact. Pupils: Pupils are equal, round, and reactive to light.    Cardiovascular:      Rate and Rhythm: Normal rate and regular rhythm. Pulses: Normal pulses. Heart sounds: Normal heart sounds. Pulmonary:      Effort: Pulmonary effort is normal. No respiratory distress. Breath sounds: Normal breath sounds. No stridor. No wheezing. Abdominal:      General: Abdomen is flat. There is no distension. Palpations: Abdomen is soft. There is no fluid wave or mass. Tenderness: There is no abdominal tenderness. Hernia: No hernia is present. Musculoskeletal:         General: No swelling, tenderness, deformity or signs of injury. Normal range of motion. Cervical back: Normal range of motion and neck supple. No rigidity or tenderness. Skin:     Coloration: Skin is not jaundiced or pale. Findings: No bruising. Neurological:      General: No focal deficit present. Mental Status: He is alert and oriented to person, place, and time. Cranial Nerves: No cranial nerve deficit. Sensory: No sensory deficit. Motor: No weakness. Psychiatric:         Mood and Affect: Mood normal.         Behavior: Behavior normal.          MDM  Number of Diagnoses or Management Options  Diagnosis management comments: We will send off stool specimen. Obtain basic labs. Continue to monitor symptoms. Abdomen is soft. 11:44 AM  Received critical labs her potassium was 6.8 without hemolysis. Creatinine elevated 2.1. Will obtain EKG. Treatment for hyperkalemia here. 12:59 PM  Received critical positive positive test from laboratory. Urinalysis also is concerning for infection. Will send for culture.   Will start oral vancomycin       Amount and/or Complexity of Data Reviewed  Clinical lab tests: ordered and reviewed  Review and summarize past medical records: yes  Discuss the patient with other providers: yes  Independent visualization of images, tracings, or specimens: yes (EKG interpretation: Sinus rhythm, rate of 70, right axis deviation noted, no blocks, no acute ST segment changes noted, comparison EKG performed 9/15/2021)    Risk of Complications, Morbidity, and/or Mortality  Presenting problems: high  Diagnostic procedures: moderate  Management options: high  General comments: Critical Care Time 60 Minutes: Excluding all billable procedures, time spent at the bedside directing patients resucsitation, updating family, and coordinating care with consultants      Patient Progress  Patient progress: stable         Procedures      Results Include:    Recent Results (from the past 24 hour(s))   MAGNESIUM    Collection Time: 02/23/22 10:00 AM   Result Value Ref Range    Magnesium 2.4 1.8 - 2.4 mg/dL   PROCALCITONIN    Collection Time: 02/23/22 10:00 AM   Result Value Ref Range    Procalcitonin 0.33 0.00 - 0.49 ng/mL   URINALYSIS W/ RFLX MICROSCOPIC    Collection Time: 02/23/22 10:11 AM   Result Value Ref Range    Color RED      Appearance TURBID      Specific gravity 1.023 1.001 - 1.023      pH (UA) 7.0 5.0 - 9.0      Protein GREATER THAN/EQUAL  (A) NEG mg/dL    Glucose Negative mg/dL    Ketone 15 (A) NEG mg/dL    Bilirubin MODERATE (A) NEG      Blood LARGE (A) NEG      Urobilinogen 1.0 0.2 - 1.0 EU/dL    Nitrites Positive (A) NEG      Leukocyte Esterase LARGE (A) NEG      WBC >100 0 /hpf    RBC >100 0 /hpf    Epithelial cells 3-5 0 /hpf    Bacteria 4+ (H) 0 /hpf    Crystals, urine MODERATE 0 /LPF    Other observations RESULTS VERIFIED MANUALLY     CBC WITH AUTOMATED DIFF    Collection Time: 02/23/22 10:20 AM   Result Value Ref Range    WBC 11.1 4.3 - 11.1 K/uL    RBC 5.14 4.23 - 5.6 M/uL    HGB 14.4 13.6 - 17.2 g/dL    HCT 48.0 41.1 - 50.3 %    MCV 93.4 79.6 - 97.8 FL    MCH 28.0 26.1 - 32.9 PG    MCHC 30.0 (L) 31.4 - 35.0 g/dL    RDW 14.6 11.9 - 14.6 %    PLATELET 443 421 - 578 K/uL    MPV 9.7 9.4 - 12.3 FL    ABSOLUTE NRBC 0.00 0.0 - 0.2 K/uL    DF AUTOMATED      NEUTROPHILS 94 (H) 43 - 78 %    LYMPHOCYTES 2 (L) 13 - 44 %    MONOCYTES 3 (L) 4.0 - 12.0 %    EOSINOPHILS 0 (L) 0.5 - 7.8 %    BASOPHILS 0 0.0 - 2.0 %    IMMATURE GRANULOCYTES 0 0.0 - 5.0 %    ABS. NEUTROPHILS 10.4 (H) 1.7 - 8.2 K/UL    ABS. LYMPHOCYTES 0.2 (L) 0.5 - 4.6 K/UL    ABS. MONOCYTES 0.4 0.1 - 1.3 K/UL    ABS. EOSINOPHILS 0.0 0.0 - 0.8 K/UL    ABS. BASOPHILS 0.0 0.0 - 0.2 K/UL    ABS. IMM. GRANS. 0.0 0.0 - 0.5 K/UL   METABOLIC PANEL, COMPREHENSIVE    Collection Time: 02/23/22 10:20 AM   Result Value Ref Range    Sodium 133 (L) 138 - 145 mmol/L    Potassium 6.5 (HH) 3.5 - 5.1 mmol/L    Chloride 108 (H) 98 - 107 mmol/L    CO2 22 21 - 32 mmol/L    Anion gap 3 (L) 7 - 16 mmol/L    Glucose 124 (H) 65 - 100 mg/dL    BUN 38 (H) 8 - 23 MG/DL    Creatinine 2.10 (H) 0.8 - 1.5 MG/DL    GFR est AA 39 (L) >60 ml/min/1.73m2    GFR est non-AA 33 (L) >60 ml/min/1.73m2    Calcium 8.6 8.3 - 10.4 MG/DL    Bilirubin, total 0.4 0.2 - 1.1 MG/DL    ALT (SGPT) 33 12 - 65 U/L    AST (SGOT) 27 15 - 37 U/L    Alk. phosphatase 72 50 - 136 U/L    Protein, total 8.3 (H) 6.3 - 8.2 g/dL    Albumin 4.1 3.2 - 4.6 g/dL    Globulin 4.2 (H) 2.3 - 3.5 g/dL    A-G Ratio 1.0 (L) 1.2 - 3.5       Voice dictation software was used during the making of this note. This software is not perfect and grammatical and other typographical errors may be present. This note has been proofread, but may still contain errors. Brittni Pitts MD; 2/23/2022 @12:55 PM   ===================================================================    I wore appropriate PPE throughout this patient's ED visit.  Brittni Pitts MD, 12:55 PM                  Orders Placed This Encounter    OVA & PARASITES, STOOL    CULTURE, STOOL    C. DIFFICILE/EPI PCR    CULTURE, URINE    CBC with Diff    CMP    URINALYSIS    WBC, STOOL    MAGNESIUM    PROCALCITONIN    GLUCOSE, POC    EKG 12 LEAD INITIAL    SALINE LOCK IV ONE TIME Routine    sodium chloride (NS) flush 5-10 mL    sodium chloride (NS) flush 5-10 mL    DISCONTD: calcium gluconate injection 1 g    sodium bicarbonate 8.4 % (1 mEq/mL) injection 50 mEq    insulin regular (NOVOLIN R, HUMULIN R) injection 10 Units    dextrose 10% infusion 250 mL    calcium gluconate 1 gram in sodium chloride (ISO-OSM) 50 mL infusion    vancomycin 50 mg/mL oral solution (compounded) 125 mg    levoFLOXacin (LEVAQUIN) 750 mg in D5W IVPB          No diagnosis found. Results for orders placed or performed during the hospital encounter of 02/23/22   1. C. DIFFICILE/EPI PCR    Specimen: Stool   Result Value Ref Range    Special Requests: NO SPECIAL REQUESTS      Culture result: Toxigenic C. difficile POSITIVE. (A)      Culture result:        RESULTS VERIFIED, PHONED TO AND READ BACK BY  DR. Chata Pina 12:58 [phone] AMM     2. CBC WITH AUTOMATED DIFF   Result Value Ref Range    WBC 11.1 4.3 - 11.1 K/uL    RBC 5.14 4.23 - 5.6 M/uL    HGB 14.4 13.6 - 17.2 g/dL    HCT 48.0 41.1 - 50.3 %    MCV 93.4 79.6 - 97.8 FL    MCH 28.0 26.1 - 32.9 PG    MCHC 30.0 (L) 31.4 - 35.0 g/dL    RDW 14.6 11.9 - 14.6 %    PLATELET 176 048 - 196 K/uL    MPV 9.7 9.4 - 12.3 FL    ABSOLUTE NRBC 0.00 0.0 - 0.2 K/uL    DF AUTOMATED      NEUTROPHILS 94 (H) 43 - 78 %    LYMPHOCYTES 2 (L) 13 - 44 %    MONOCYTES 3 (L) 4.0 - 12.0 %    EOSINOPHILS 0 (L) 0.5 - 7.8 %    BASOPHILS 0 0.0 - 2.0 %    IMMATURE GRANULOCYTES 0 0.0 - 5.0 %    ABS. NEUTROPHILS 10.4 (H) 1.7 - 8.2 K/UL    ABS. LYMPHOCYTES 0.2 (L) 0.5 - 4.6 K/UL    ABS. MONOCYTES 0.4 0.1 - 1.3 K/UL    ABS. EOSINOPHILS 0.0 0.0 - 0.8 K/UL    ABS. BASOPHILS 0.0 0.0 - 0.2 K/UL    ABS. IMM. GRANS. 0.0 0.0 - 0.5 K/UL   3.  METABOLIC PANEL, COMPREHENSIVE   Result Value Ref Range    Sodium 133 (L) 138 - 145 mmol/L    Potassium 6.5 (HH) 3.5 - 5.1 mmol/L    Chloride 108 (H) 98 - 107 mmol/L    CO2 22 21 - 32 mmol/L    Anion gap 3 (L) 7 - 16 mmol/L    Glucose 124 (H) 65 - 100 mg/dL    BUN 38 (H) 8 - 23 MG/DL    Creatinine 2.10 (H) 0.8 - 1.5 MG/DL    GFR est AA 39 (L) >60 ml/min/1.73m2    GFR est non-AA 33 (L) >60 ml/min/1.73m2    Calcium 8.6 8.3 - 10.4 MG/DL    Bilirubin, total 0.4 0.2 - 1.1 MG/DL    ALT (SGPT) 33 12 - 65 U/L    AST (SGOT) 27 15 - 37 U/L    Alk. phosphatase 72 50 - 136 U/L    Protein, total 8.3 (H) 6.3 - 8.2 g/dL    Albumin 4.1 3.2 - 4.6 g/dL    Globulin 4.2 (H) 2.3 - 3.5 g/dL    A-G Ratio 1.0 (L) 1.2 - 3.5     4. URINALYSIS W/ RFLX MICROSCOPIC   Result Value Ref Range    Color RED      Appearance TURBID      Specific gravity 1.023 1.001 - 1.023      pH (UA) 7.0 5.0 - 9.0      Protein GREATER THAN/EQUAL  (A) NEG mg/dL    Glucose Negative mg/dL    Ketone 15 (A) NEG mg/dL    Bilirubin MODERATE (A) NEG      Blood LARGE (A) NEG      Urobilinogen 1.0 0.2 - 1.0 EU/dL    Nitrites Positive (A) NEG      Leukocyte Esterase LARGE (A) NEG      WBC >100 0 /hpf    RBC >100 0 /hpf    Epithelial cells 3-5 0 /hpf    Bacteria 4+ (H) 0 /hpf    Crystals, urine MODERATE 0 /LPF    Other observations RESULTS VERIFIED MANUALLY     5. WBC, STOOL   Result Value Ref Range    White blood cells, stool NO WBC'S SEEN 0 - 4 /HPF   6. MAGNESIUM   Result Value Ref Range    Magnesium 2.4 1.8 - 2.4 mg/dL   7. PROCALCITONIN   Result Value Ref Range    Procalcitonin 0.33 0.00 - 0.49 ng/mL   8.  GLUCOSE, POC   Result Value Ref Range    Glucose (POC) 103 (H) 65 - 100 mg/dL    Performed by [employer]

## 2022-02-23 NOTE — ACP (ADVANCE CARE PLANNING)
[facility]  At the heart of better care     Advance Care Planning   Admit Date:  2022  9:55 AM   Name:  Asiya Marroquin   Age:  64 y.o. Sex:  male  :  1945   MRN:  [id number]   Room:  [address] [place] [address] 57 is able to make his own decisions: Yes        Patient / surrogate decision-maker directed:  Code Status: FULL CODE -full aggressive medical and surgical interventions, including intubations, resuscitations, pressors, artificial tube feeding      Patient or surrogate consented to discussion of the current conditions, workup, management plans, prognosis, and understand the risk for further deterioration. Time spent: 17 minutes in direct discussion (face to face and/or over phone).     Signed:  Zander Coffey DO

## 2022-02-23 NOTE — PROGRESS NOTES
TRANSFER - IN REPORT:    Verbal report received from [facility] on [address]  being received from ED for routine progression of care      Report consisted of patients Situation, Background, Assessment and   Recommendations(SBAR). Information from the following report(s) SBAR, Kardex, Intake/Output, MAR and Recent Results was reviewed with the receiving nurse. Opportunity for questions and clarification was provided. Assessment will be completed upon patients arrival to unit and care assumed.

## 2022-02-23 NOTE — ED TRIAGE NOTES
Patient comes via ems from home co diarrhea x1 day. Patient has ostomy bag due to rectal cancer.  Patient denies any pain

## 2022-02-24 PROBLEM — E87.5 HYPERKALEMIA: Status: RESOLVED | Noted: 2021-09-15 | Resolved: 2022-02-24

## 2022-02-24 PROBLEM — R82.81 PYURIA: Status: ACTIVE | Noted: 2022-02-24

## 2022-02-24 LAB
ANION GAP SERPL CALC-SCNC: 7 MMOL/L (ref 7–16)
BASOPHILS # BLD: 0 K/UL (ref 0–0.2)
BASOPHILS NFR BLD: 1 % (ref 0–2)
BUN SERPL-MCNC: 50 MG/DL (ref 8–23)
CALCIUM SERPL-MCNC: 8.7 MG/DL (ref 8.3–10.4)
CHLORIDE SERPL-SCNC: 109 MMOL/L (ref 98–107)
CO2 SERPL-SCNC: 21 MMOL/L (ref 21–32)
CREAT SERPL-MCNC: 2.5 MG/DL (ref 0.8–1.5)
DIFFERENTIAL METHOD BLD: ABNORMAL
EOSINOPHIL # BLD: 0 K/UL (ref 0–0.8)
EOSINOPHIL NFR BLD: 0 % (ref 0.5–7.8)
ERYTHROCYTE [DISTWIDTH] IN BLOOD BY AUTOMATED COUNT: 14.6 % (ref 11.9–14.6)
GLUCOSE SERPL-MCNC: 93 MG/DL (ref 65–100)
HCT VFR BLD AUTO: 44.4 % (ref 41.1–50.3)
HGB BLD-MCNC: 13.5 G/DL (ref 13.6–17.2)
IMM GRANULOCYTES # BLD AUTO: 0 K/UL (ref 0–0.5)
IMM GRANULOCYTES NFR BLD AUTO: 0 % (ref 0–5)
LYMPHOCYTES # BLD: 0.3 K/UL (ref 0.5–4.6)
LYMPHOCYTES NFR BLD: 6 % (ref 13–44)
MCH RBC QN AUTO: 27.7 PG (ref 26.1–32.9)
MCHC RBC AUTO-ENTMCNC: 30.4 G/DL (ref 31.4–35)
MCV RBC AUTO: 91 FL (ref 79.6–97.8)
MONOCYTES # BLD: 0.7 K/UL (ref 0.1–1.3)
MONOCYTES NFR BLD: 13 % (ref 4–12)
NEUTS SEG # BLD: 4.4 K/UL (ref 1.7–8.2)
NEUTS SEG NFR BLD: 80 % (ref 43–78)
NRBC # BLD: 0 K/UL (ref 0–0.2)
PLATELET # BLD AUTO: 216 K/UL (ref 150–450)
PMV BLD AUTO: 9.6 FL (ref 9.4–12.3)
POTASSIUM SERPL-SCNC: 4.5 MMOL/L (ref 3.5–5.1)
RBC # BLD AUTO: 4.88 M/UL (ref 4.23–5.6)
SODIUM SERPL-SCNC: 137 MMOL/L (ref 138–145)
WBC # BLD AUTO: 5.5 K/UL (ref 4.3–11.1)

## 2022-02-24 PROCEDURE — 74011250637 HC RX REV CODE- 250/637: Performed by: FAMILY MEDICINE

## 2022-02-24 PROCEDURE — 36415 COLL VENOUS BLD VENIPUNCTURE: CPT

## 2022-02-24 PROCEDURE — 85025 COMPLETE CBC W/AUTO DIFF WBC: CPT

## 2022-02-24 PROCEDURE — 86580 TB INTRADERMAL TEST: CPT | Performed by: FAMILY MEDICINE

## 2022-02-24 PROCEDURE — 74011000250 HC RX REV CODE- 250: Performed by: EMERGENCY MEDICINE

## 2022-02-24 PROCEDURE — 74011250636 HC RX REV CODE- 250/636: Performed by: FAMILY MEDICINE

## 2022-02-24 PROCEDURE — 74011000258 HC RX REV CODE- 258: Performed by: FAMILY MEDICINE

## 2022-02-24 PROCEDURE — 97530 THERAPEUTIC ACTIVITIES: CPT

## 2022-02-24 PROCEDURE — 97165 OT EVAL LOW COMPLEX 30 MIN: CPT

## 2022-02-24 PROCEDURE — 80048 BASIC METABOLIC PNL TOTAL CA: CPT

## 2022-02-24 PROCEDURE — 74011000250 HC RX REV CODE- 250: Performed by: FAMILY MEDICINE

## 2022-02-24 PROCEDURE — 97161 PT EVAL LOW COMPLEX 20 MIN: CPT

## 2022-02-24 PROCEDURE — 97535 SELF CARE MNGMENT TRAINING: CPT

## 2022-02-24 PROCEDURE — 65270000029 HC RM PRIVATE

## 2022-02-24 PROCEDURE — 2709999900 HC NON-CHARGEABLE SUPPLY

## 2022-02-24 PROCEDURE — 77030013076 HC PCH OST BAG COLO -A

## 2022-02-24 RX ADMIN — Medication 5 UNITS: at 06:40

## 2022-02-24 RX ADMIN — SODIUM CHLORIDE, PRESERVATIVE FREE 10 ML: 5 INJECTION INTRAVENOUS at 21:31

## 2022-02-24 RX ADMIN — PIPERACILLIN SODIUM,TAZOBACTAM SODIUM 3.38 G: 3; .375 INJECTION, POWDER, FOR SOLUTION INTRAVENOUS at 16:44

## 2022-02-24 RX ADMIN — SODIUM CHLORIDE, PRESERVATIVE FREE 10 ML: 5 INJECTION INTRAVENOUS at 21:32

## 2022-02-24 RX ADMIN — SODIUM CHLORIDE, PRESERVATIVE FREE 10 ML: 5 INJECTION INTRAVENOUS at 05:27

## 2022-02-24 RX ADMIN — VANCOMYCIN HYDROCHLORIDE 125 MG: 5 INJECTION, POWDER, LYOPHILIZED, FOR SOLUTION INTRAVENOUS at 12:30

## 2022-02-24 RX ADMIN — CARVEDILOL 6.25 MG: 3.12 TABLET, FILM COATED ORAL at 08:56

## 2022-02-24 RX ADMIN — SODIUM CHLORIDE, PRESERVATIVE FREE 10 ML: 5 INJECTION INTRAVENOUS at 05:29

## 2022-02-24 RX ADMIN — PIPERACILLIN SODIUM,TAZOBACTAM SODIUM 3.38 G: 3; .375 INJECTION, POWDER, FOR SOLUTION INTRAVENOUS at 08:56

## 2022-02-24 RX ADMIN — SODIUM CHLORIDE, PRESERVATIVE FREE 10 ML: 5 INJECTION INTRAVENOUS at 14:25

## 2022-02-24 RX ADMIN — VANCOMYCIN HYDROCHLORIDE 125 MG: 5 INJECTION, POWDER, LYOPHILIZED, FOR SOLUTION INTRAVENOUS at 00:04

## 2022-02-24 RX ADMIN — AMITRIPTYLINE HYDROCHLORIDE 100 MG: 50 TABLET, FILM COATED ORAL at 22:13

## 2022-02-24 RX ADMIN — OLANZAPINE 7.5 MG: 2.5 TABLET, FILM COATED ORAL at 22:13

## 2022-02-24 RX ADMIN — CLONAZEPAM 4 MG: 1 TABLET ORAL at 22:13

## 2022-02-24 RX ADMIN — PIPERACILLIN SODIUM,TAZOBACTAM SODIUM 3.38 G: 3; .375 INJECTION, POWDER, FOR SOLUTION INTRAVENOUS at 00:03

## 2022-02-24 RX ADMIN — SODIUM CHLORIDE 75 ML/HR: 900 INJECTION, SOLUTION INTRAVENOUS at 05:30

## 2022-02-24 RX ADMIN — VANCOMYCIN HYDROCHLORIDE 125 MG: 5 INJECTION, POWDER, LYOPHILIZED, FOR SOLUTION INTRAVENOUS at 05:30

## 2022-02-24 RX ADMIN — CARVEDILOL 6.25 MG: 3.12 TABLET, FILM COATED ORAL at 16:43

## 2022-02-24 RX ADMIN — LEVOTHYROXINE SODIUM 125 MCG: 0.07 TABLET ORAL at 05:30

## 2022-02-24 RX ADMIN — VANCOMYCIN HYDROCHLORIDE 125 MG: 5 INJECTION, POWDER, LYOPHILIZED, FOR SOLUTION INTRAVENOUS at 17:01

## 2022-02-24 RX ADMIN — VANCOMYCIN HYDROCHLORIDE 125 MG: 5 INJECTION, POWDER, LYOPHILIZED, FOR SOLUTION INTRAVENOUS at 23:38

## 2022-02-24 NOTE — PROGRESS NOTES
Physician Progress Note      PATIENTSaranya Talbot  CSN #:                  [id number]  :                       1945  ADMIT DATE:       2022 9:55 AM  DISCH DATE:  Aiden Escobedo  PROVIDER #:        Kathleen Medina MD          QUERY TEXT:    Pt admitted with UTI. Pt noted to have chronic indwelling urinary catheter. If possible, please document in the progress notes and discharge summary if you are evaluating and/or treating any of the following:     The medical record reflects the following:  Risk Factors: chronic brito in place  Clinical Indicators: urine culture: \"100,000 COLONIES/mL GRAM NEGATIVE RODS SUBCULTURE IN PROGRESS \" UA 4+  bacteria, positive nitrates, large leukocyte esterase  Treatment: IV Zosyn  Options provided:  -- UTI due to chronic indwelling urinary catheter  -- UTI not due to indwelling urinary catheter  -- Other - I will add my own diagnosis  -- Disagree - Not applicable / Not valid  -- Disagree - Clinically unable to determine / Unknown  -- Refer to Clinical Documentation Reviewer    PROVIDER RESPONSE TEXT:    Pyuria and urine cx likely represent colonization due to chronic brito    Query created by: Cristhian Patel on 2022 7:07 AM      Electronically signed by:  Kathleen Medina MD 2022 3:02 PM

## 2022-02-24 NOTE — PROGRESS NOTES
Pt is stable with bed in lowest position, wheels locked, and call light within reach. Hourly rounds completed. VSS. IV is patent and dressing is clean, dry, and intact. Pt emptied ostomy bag several times through the night. Report to be given to day shift nurse.

## 2022-02-24 NOTE — PROGRESS NOTES
ACUTE OT GOALS:  (Developed with and agreed upon by patient and/or caregiver.)  1. Pt will toilet with SBA   2. Pt will complete functional mobility for ADLs with SBA using AD as needed  3. Pt will complete lower body dressing with SBA using AE as needed  4. Pt will complete grooming and hygiene at sink with SBA  5. Pt will demonstrate independence with HEP to promote increased BUE strength and functional use for ADLs   ADLs    Timeframe: 7 days      OCCUPATIONAL THERAPY ASSESSMENT: Initial Assessment and Daily Note OT Treatment Day # 1    Rosetta Lacy is a 64 y.o. male   PRIMARY DIAGNOSIS: Complicated UTI (urinary tract infection)  C. difficile colitis [I24.81]  Complicated UTI (urinary tract infection) [N39.0]       Reason for Referral:  Generalized weakness  ICD-10: Treatment Diagnosis: Generalized Muscle Weakness (M62.81)  [place] / [place] / Product Type: Medicare /   ASSESSMENT:     REHAB RECOMMENDATIONS:   Recommendation to date pending progress:  [address] [place] [address] Therapy  Equipment:    3 in 1 Bedside Commode   Rolling Walker     PRIOR LEVEL OF FUNCTION:  (Prior to Hospitalization)  INITIAL/CURRENT LEVEL OF FUNCTION:  (Based on today's evaluation)   Bathing:   Independent  Dressing:   Independent  Feeding/Grooming:   Independent  Toileting:   Independent  Functional Mobility:   Independent Bathing:   Contact Guard Assistance  Dressing:   Contact Guard Assistance  Feeding/Grooming:   Contact Guard Assistance  Toileting:   Contact Guard Assistance  Functional Mobility:   Contact Guard Assistance     ASSESSMENT:  Mr. Yenny Foster presented generally weak with deficits in strength, endurance, mobility, and balance impacting ADLs. Pt demonstrated ADLs and mobility for ADLs with CGA overall, initially used RW but then wanted to attempt mobility without, better mobility w/ UE support from AD.  Pt is below his functional baseline and would benefit from skilled OT services to address deficits. SUBJECTIVE:   Mr. Gretchen Garcia states, \"My legs feel weak. \"    SOCIAL HISTORY/LIVING ENVIRONMENT: Lives alone in [place], independent, drives, no AD. Tub shower. Has friends to help.        OBJECTIVE:     PAIN: VITAL SIGNS: LINES/DRAINS:   Pre Treatment: Pain Screen  Pain Scale 1: Numeric (0 - 10)  Pain Intensity 1: 0  Post Treatment: 0   IV  O2 Device: None (Room air)     GROSS EVALUATION:  BUE Within Functional Limits Abnormal/ Functional Abnormal/ Non-Functional (see comments) Not Tested Comments:   AROM [x] [] [] []    PROM [] [] [] []    Strength [] [x] [] []    Balance [] [x] [] []    Posture [] [] [] []    Sensation [] [] [] []    Coordination [x] [] [] []    Tone [] [] [] []    Edema [] [] [] []    Activity Tolerance [] [x] [] []     [] [] [] []      COGNITION/  PERCEPTION: Intact Impaired   (see comments) Comments:   Orientation [x] []    Vision [x] []    Hearing [x] []    Judgment/ Insight [x] []    Attention [x] []    Memory [x] []    Command Following [x] []    Emotional Regulation [x] []     [] []      ACTIVITIES OF DAILY LIVING: I Mod I S SBA CGA Min Mod Max Total NT Comments   BASIC ADLs:              Bathing/ Showering [] [] [] [] [] [] [] [] [] []    Toileting [] [] [] [] [] [] [] [] [] []    Dressing [] [] [] [] [x] [] [] [] [] [] pants   Feeding [] [] [] [] [] [] [] [] [] []    Grooming [] [] [] [] [x] [] [] [] [] [] Standing at sink   Personal Device Care [] [] [] [] [] [] [] [] [] []    Functional Mobility [] [] [] [] [x] [] [] [] [] [] RW   I=Independent, Mod I=Modified Independent, S=Supervision, SBA=Standby Assistance, CGA=Contact Guard Assistance,   Min=Minimal Assistance, Mod=Moderate Assistance, Max=Maximal Assistance, Total=Total Assistance, NT=Not Tested    MOBILITY: I Mod I S SBA CGA Min Mod Max Total  NT x2 Comments:   Supine to sit [] [] [] [x] [] [] [] [] [] [] []    Sit to supine [] [] [] [] [] [] [] [] [] [] []    Sit to stand [] [] [] [] [x] [] [] [] [] [] []    Bed to chair [] [] [] [] [x] [] [] [] [] [] []    I=Independent, Mod I=Modified Independent, S=Supervision, SBA=Standby Assistance, CGA=Contact Guard Assistance,   Min=Minimal Assistance, Mod=Moderate Assistance, Max=Maximal Assistance, Total=Total Assistance, NT=Not Tested    [facility]   Daily Activity Inpatient Short Form        How much help from another person does the patient currently need. .. Total A Lot A Little None   1. Putting on and taking off regular lower body clothing? [] 1   [] 2   [x] 3   [] 4   2. Bathing (including washing, rinsing, drying)? [] 1   [] 2   [x] 3   [] 4   3. Toileting, which includes using toilet, bedpan or urinal?   [] 1   [] 2   [x] 3   [] 4   4. Putting on and taking off regular upper body clothing? [] 1   [] 2   [] 3   [x] 4   5. Taking care of personal grooming such as brushing teeth? [] 1   [] 2   [] 3   [x] 4   6. Eating meals? [] 1   [] 2   [] 3   [x] 4   © 2007, Trustees of [facility] [employer], under license to [employer]. All rights reserved     Score:  Initial: 21 Most Recent: X (Date: -- )   Interpretation of Tool:  Represents activities that are increasingly more difficult (i.e. Bed mobility, Transfers, Gait). PLAN:   FREQUENCY/DURATION: OT Plan of Care: 3 times/week for duration of hospital stay or until stated goals are met, whichever comes first.    PROBLEM LIST:   (Skilled intervention is medically necessary to address:)  1. Decreased ADL/Functional Activities  2. Decreased Activity Tolerance  3. Decreased Balance  4. Decreased Strength   INTERVENTIONS PLANNED:   (Benefits and precautions of occupational therapy have been discussed with the patient.)  1. Self Care Training  2. Therapeutic Activity  3. Therapeutic Exercise/HEP  4. Neuromuscular Re-education  5.  Education     TREATMENT:     EVALUATION: Low Complexity : (Untimed Charge)    TREATMENT:   ($$ Self Care/Home Management: 8-22 mins    )  Self Care (10 Minutes): Self care including Lower Body Dressing and Grooming to increase independence and decrease level of assistance required.     TREATMENT GRID:  N/A    AFTER TREATMENT POSITION/PRECAUTIONS:  Chair, Needs within reach and RN notified    INTERDISCIPLINARY COLLABORATION:  RN/PCT and PT/PTA    TOTAL TREATMENT DURATION:  OT Patient Time In/Time Out  Time In: 0024  Time Out: [address] [facility]

## 2022-02-24 NOTE — PROGRESS NOTES
Hourly rounding completed during shift. All needs met at this time. Pt denies any pain or discomfort at this time. Ostomy wafer and bag change today x1 d/t leaking watery stools, pt tolerated well. Per pt request brito cath left to leg bag for convenience for pt. Bed L/L with call bell in reach. Report will be given to oncoming RN.

## 2022-02-24 NOTE — PROGRESS NOTES
ACUTE PHYSICAL THERAPY GOALS:   (Developed with and agreed upon by patient and/or caregiver.)  1. Pt will perform bed mobility (I) without cueing  in 7 therapy sessions. 2. Pt will perform all transfers under (S) without cueing in 7 therapy sessions. 3. Pt will ambulate 500 ft under (S) with use of LRAD and 1-2 breaks as needed in 7 therapy sessions. 4. Pt will perform standing balance activities with minimal postural sway in 7 therapy sessions. 5. Pt will tolerate multiple sets and reps of BLE exercises in 7 therapy sessions. PHYSICAL THERAPY ASSESSMENT: Initial Assessment and AM PT Treatment Day # 1      Mendez Butcher is a 64 y.o. male   PRIMARY DIAGNOSIS: Complicated UTI (urinary tract infection)  C. difficile colitis [P11.42]  Complicated UTI (urinary tract infection) [N39.0]       Reason for Referral:    ICD-10: Treatment Diagnosis: Generalized Muscle Weakness (M62.81)  Difficulty in walking, Not elsewhere classified (R26.2)  [place] / Product Type: Medicare /     ASSESSMENT:     REHAB RECOMMENDATIONS:   Recommendation to date pending progress:  [address] [place] [address] Therapy  Equipment:    Rolling Walker     PRIOR LEVEL OF FUNCTION:  (Prior to Hospitalization) INITIAL/CURRENT LEVEL OF FUNCTION:  (Most Recently Demonstrated)   Bed Mobility:   Independent  Sit to Stand:   Independent  Transfers:   Independent  Gait/Mobility:   Independent Bed Mobility:   Standby Assistance  Sit to Stand:  [facility] Assistance  Transfers:   Standby Assistance  Gait/Mobility:   Contact Guard Assistance     ASSESSMENT:  Mr. Bridget Galindo Is a 64 y.o M presenting to PT after coming to ED on  c/o worsening diarrhea. PTA pt lives alone in a town house [address]; he reports he was functionally (I) at baseline for all mobility needs without use of any AD.  At time of initial evaluation, pt presents slightly below baseline LOF with deficits in strength, standing dynamic balance, gait and activity tolerance limiting his overall functional mobility. Today, pt performed most mobility with SB (A) while requiring CG (A) and use of RW for ambulation. Of note, pt ambulated 175'x1 total; he used a RW for the first 100' and then opted to walk without it for the final 75'. Pt ambulates c dec margot, dec step length/ foot clearance HEATHER, inc postural sway and retroverted BLE. Pt did fairly well when moving about on his feet however he did show moments of unsteadiness and he endorses feeling weaker than his typical self throughout all mobility. Upon return to room, pt (A) by OT for self-care activities in bathroom and PT exited room. At this time, pt is an appropriate candidate for skilled PT and will benefit from POC designed to address the aforementioned deficits. Upon completion of treatment, pt left in company of OT. RN was made aware of pt performance. DC Recommendation: Home c HH     SUBJECTIVE:   Mr. Mathew Melo states, \"I just feel weak\"    SOCIAL HISTORY/LIVING ENVIRONMENT:  lives alone in a town house [address]; Functionally (I) at baseline for all mobility needs without use of any AD. Reportedly does not own any AD/ DME.      OBJECTIVE:     PAIN: VITAL SIGNS: LINES/DRAINS:   Pre Treatment: Pain Screen  Pain Scale 1: Numeric (0 - 10)  Pain Intensity 1: 0  Post Treatment: 0 Vital Signs  O2 Device: None (Room air) IV  O2 Device: None (Room air)     GROSS EVALUATION:  BLE Within Functional Limits Abnormal/ Functional Abnormal/ Non-Functional (see comments) Not Tested Comments:   AROM [x] [] [] []    PROM [] [] [] []    Strength [] [x] [] [] Below baseline level   Balance [] [x] [] [] Inc postural sway    Posture [] [x] [] [] Forward flexed   Sensation [] [] [] [x]    Coordination [x] [] [] []    Tone [x] [] [] []    Edema [x] [] [] []    Activity Tolerance [] [x] [] [] Limited by weakness and fatigue    [] [] [] []      COGNITION/  PERCEPTION: Intact Impaired   (see comments) Comments:   Orientation [x] []    Vision [x] []    Hearing [x] []    Command Following [x] []    Safety Awareness [] [x] Cues to slow pace of activity     [] []      MOBILITY: I Mod I S SBA CGA Min Mod Max Total  NT x2 Comments:   Bed Mobility    Rolling [] [] [] [x] [] [] [] [] [] [] []    Supine to Sit [] [] [] [x] [] [] [] [] [] [] []    Scooting [] [] [] [x] [] [] [] [] [] [] []    Sit to Supine [] [] [] [] [] [] [] [] [] [x] []    Transfers    Sit to Stand [] [] [] [x] [] [] [] [] [] [] []    Bed to Chair [] [] [] [] [] [] [] [] [] [x] []    Stand to Sit [] [] [] [x] [] [] [] [] [] [] []    I=Independent, Mod I=Modified Independent, S=Supervision, SBA=Standby Assistance, CGA=Contact Guard Assistance,   Min=Minimal Assistance, Mod=Moderate Assistance, Max=Maximal Assistance, Total=Total Assistance, NT=Not Tested  GAIT: I Mod I S SBA CGA Min Mod Max Total  NT x2 Comments:   Level of Assistance [] [] [] [] [x] [] [] [] [] [] []    Distance 175'x1    DME Rolling Walker and (RW for first 100';  no AD for final 75')    Gait Quality dec margot, dec step length/ foot clearance HEATHER, inc postural sway and retroverted BLE    Weightbearing Status N/A     I=Independent, Mod I=Modified Independent, S=Supervision, SBA=Standby Assistance, CGA=Contact Guard Assistance,   Min=Minimal Assistance, Mod=Moderate Assistance, Max=Maximal Assistance, Total=Total Assistance, NT=Not Tested    [address] [id number] AM-PAC 6 Clicks   Basic Mobility Inpatient Short Form       How much difficulty does the patient currently have. .. Unable A Lot A Little None   1. Turning over in bed (including adjusting bedclothes, sheets and blankets)? [] 1   [] 2   [] 3   [x] 4   2. Sitting down on and standing up from a chair with arms ( e.g., wheelchair, bedside commode, etc.)   [] 1   [] 2   [x] 3   [] 4   3. Moving from lying on back to sitting on the side of the bed?    [] 1   [] 2   [] 3   [x] 4   How much help from another person does the patient currently need. .. Total A Lot A Little None   4. Moving to and from a bed to a chair (including a wheelchair)? [] 1   [] 2   [] 3   [x] 4   5. Need to walk in hospital room? [] 1   [] 2   [x] 3   [] 4   6. Climbing 3-5 steps with a railing? [] 1   [x] 2   [] 3   [] 4   © 2007, Trustees of [address] [place] [address] [address] [address], under license to [employer]. All rights reserved     Score:  Initial: 20 Most Recent: X (Date: -- )    Interpretation of Tool:  Represents activities that are increasingly more difficult (i.e. Bed mobility, Transfers, Gait). PLAN:   FREQUENCY/DURATION: PT Plan of Care: 3 times/week for duration of hospital stay or until stated goals are met, whichever comes first.    PROBLEM LIST:   (Skilled intervention is medically necessary to address:)  1. Decreased ADL/Functional Activities  2. Decreased Activity Tolerance  3. Decreased Balance  4. Decreased Gait Ability  5. Decreased Strength   INTERVENTIONS PLANNED:   (Benefits and precautions of physical therapy have been discussed with the patient.)  1. Therapeutic Activity  2. Therapeutic Exercise/HEP  3. Neuromuscular Re-education  4. Gait Training  5. Manual Therapy  6. Education     TREATMENT:     EVALUATION: Low Complexity : (Untimed Charge)    TREATMENT:   ($$ Therapeutic Activity: 8-22 mins    )  Therapeutic Activity (10 Minutes): Therapeutic activity included Rolling, Supine to Sit, Scooting, Ambulation on level ground, Sitting balance  and Standing balance to improve functional Mobility, Strength and Activity tolerance.     TREATMENT GRID:  N/A    AFTER TREATMENT POSITION/PRECAUTIONS:  Left in company of [address]:  RN/PCT, PT/PTA and OT/TURK    TOTAL TREATMENT DURATION:  PT Patient Time In/Time Out  Time In: Adan Corbett 45  Time Out: 0935    [facility], PT

## 2022-02-24 NOTE — PROGRESS NOTES
Problem: Risk for Spread of Infection  Goal: Prevent transmission of infectious organism to others  Description: Prevent the transmission of infectious organisms to other patients, staff members, and visitors. Outcome: Progressing Towards Goal     Problem: Patient Education:  Go to Education Activity  Goal: Patient/Family Education  Outcome: Progressing Towards Goal     Problem: Falls - Risk of  Goal: *Absence of Falls  Description: Document Cristina Wallis Fall Risk and appropriate interventions in the flowsheet.   Outcome: Progressing Towards Goal  Note: Fall Risk Interventions:            Medication Interventions: Patient to call before getting OOB                   Problem: Patient Education: Go to Patient Education Activity  Goal: Patient/Family Education  Outcome: Progressing Towards Goal

## 2022-02-24 NOTE — PROGRESS NOTES
Hospitalist Progress Note   Admit Date:  2022  9:55 AM   Name:  Geetha Marroquin   Age:  64 y.o. Sex:  male  :  1945   MRN:  [id number]   Room:  603/01    Presenting Complaint: Diarrhea    Reason(s) for Admission: C. difficile colitis [M57.85]  Complicated UTI (urinary tract infection) [N39.0]     Hospital Course & Interval History:   Mr. Dipti Neves is a nice 65 y/o WM with a h/o DVT, rectal cancer s/p sigmoidectomy now with colostomy, MDD, urinary retention with chronic Brito who presented to the ER on  with about a 1 day history of non-bloodoy diarrhea. No recent abx or hospitalizations. Labs showed REBEKAH. Cdiff positive, started on oral vancomycin. UA with pyuria so empirically started on Zosyn. Subjective/24hr Events (22): Ostomy output is the same. Patient is up ambulating in his room, feels ok. No fevers, chest pain or N/V. PO intake needs to improve. He says he's starting to see a little more urine output in his leg bag compared to the past few days. ROS:  10 systems reviewed and negative except as noted above. Assessment & Plan:   # REBEKAH   - Suspect pre-renal due to GI losses. Cr up today, will increase IVFs. UA with protein, check urine sodium/Cr and spot ratio. Renal US unremarkable. # Diarrhea 2/2 Cdiff colitis   - Con't PO vancomycin x10 days total.     # Pyuria   - Has chronic Brito for chronic urinary retention. He does not have any urinary symptoms to suggest a UTI and given chronic Brito suspect he is colonized. Urine culture growing GNRs, will con't with abx for now and follow up final ID/sensitivities, though ongoing abx for this will only pressure more resistance. # HyperK   - 6.5 on admission, now resolved. # Hypothyroidism   - Synthroid    # HTN   - Con't Coreg    # Anxiety d/o   - Klonopin (normal dose 6mg QHS reduced to 4mg with REBEKAH). Verified [facility] Rx through [facility] . # Chronic urinary retention   - Chronic brito.      Dispo/Discharge Planning: Likely home when able. Diet:  ADULT DIET Regular; Low Potassium (Less than 3000 mg/day)  DVT PPx: SCDs, ambulation  Code status: Full Code    Hospital Problems as of 2/24/2022 Date Reviewed: 12/10/2021          Codes Class Noted - Resolved POA    Pyuria ICD-10-CM: R82.81  ICD-9-CM: 791.9  2/24/2022 - Present Yes        C. difficile colitis ICD-10-CM: A04.72  ICD-9-CM: 008.45  2/23/2022 - Present Yes        * (Principal[facility] ICD-10-CM: N17.9  ICD-9-CM: 584.9  9/15/2021 - Present Yes        H/O urinary retention ICD-10-CM: Z87.898  ICD-9-CM: V13.09  8/27/2021 - Present Yes        Anxiety ICD-10-CM: F41.9  ICD-9-CM: 300.00  5/11/2021 - Present Yes        Major depression ICD-10-CM: F32.9  ICD-9-CM: 296.20  Unknown - Present Yes        Hypothyroid ICD-10-CM: E03.9  ICD-9-CM: 244.9  6/14/2019 - Present Yes    Overview Signed 6/14/2019 12:12 AM by Zulema Bryant MD     stable w/med             Chronic indwelling Bishop catheter ICD-10-CM: Z97.8  ICD-9-CM: V45.89  5/19/2019 - Present Yes        RESOLVED: Hyperkalemia ICD-10-CM: E87.5  ICD-9-CM: 276.7  9/15/2021 - 2/24/2022 Yes              Objective:     Patient Vitals for the past 24 hrs:   Temp Pulse Resp BP SpO2   02/24/22 1102 97.2 °F (36.2 °C) 100 20 92/69 95 %   02/24/22 0734 97.4 °F (36.3 °C) 93 20 127/89 96 %   02/24/22 0315  100 18 126/79 94 %   02/24/22 0020 98.5 °F (36.9 °C) 96 18 98/65 95 %   02/23/22 1843 97.2 °F (36.2 °C) 87 18 102/73 97 %     Oxygen Therapy  O2 Sat (%): 95 % (02/24/22 1102)  Pulse via Oximetry: 75 beats per minute (02/23/22 1240)  O2 Device: None (Room air) (02/24/22 0917)    Estimated body mass index is 19.54 kg/m² as calculated from the following:    Height as of this encounter: 5' 8\" (1.727 m). Weight as of this encounter: 58.3 kg (128 lb 8 oz).     Intake/Output Summary (Last 24 hours) at 2/24/2022 1428  Last data filed at 2/24/2022 1415  Gross per 24 hour   Intake    Output 2400 ml   Net -2400 ml Physical Exam:   Blood pressure 92/69, pulse 100, temperature 97.2 °F (36.2 °C), resp. rate 20, height 5' 8\" (1.727 m), weight 58.3 kg (128 lb 8 oz), SpO2 95 %. General:    Thin, underweight. Chronically ill appearing. Head:  Normocephalic, atraumatic  Eyes:  Sclerae appear normal.  Pupils equally round. ENT:  Nares appear normal, no drainage. Moist oral mucosa  Neck:  No restricted ROM. Trachea midline   CV:   RRR. No m/r/g. No jugular venous distension. Lungs:   CTAB. No wheezing, rhonchi, or rales. Respirations even, unlabored  Abdomen: Bowel sounds present. Soft, nontender, nondistended. Ostomy present with brown liquid stool in bag. Extremities: No cyanosis or clubbing. No edema  Skin:     No rashes and normal coloration. Warm and dry. Neuro:  CN II-XII grossly intact. Sensation intact. A&Ox3  Psych:  Normal mood and affect.       I have reviewed ordered lab tests and independently visualized imaging below:    Recent Labs:  Recent Results (from the past 48 hour(s))   MAGNESIUM    Collection Time: 02/23/22 10:00 AM   Result Value Ref Range    Magnesium 2.4 1.8 - 2.4 mg/dL   PROCALCITONIN    Collection Time: 02/23/22 10:00 AM   Result Value Ref Range    Procalcitonin 0.33 0.00 - 0.49 ng/mL   URINALYSIS W/ RFLX MICROSCOPIC    Collection Time: 02/23/22 10:11 AM   Result Value Ref Range    Color RED      Appearance TURBID      Specific gravity 1.023 1.001 - 1.023      pH (UA) 7.0 5.0 - 9.0      Protein GREATER THAN/EQUAL  (A) NEG mg/dL    Glucose Negative mg/dL    Ketone 15 (A) NEG mg/dL    Bilirubin MODERATE (A) NEG      Blood LARGE (A) NEG      Urobilinogen 1.0 0.2 - 1.0 EU/dL    Nitrites Positive (A) NEG      Leukocyte Esterase LARGE (A) NEG      WBC >100 0 /hpf    RBC >100 0 /hpf    Epithelial cells 3-5 0 /hpf    Bacteria 4+ (H) 0 /hpf    Crystals, urine MODERATE 0 /LPF    Other observations RESULTS VERIFIED MANUALLY     CULTURE, STOOL    Collection Time: 02/23/22 10:11 AM Specimen: Stool   Result Value Ref Range    Special Requests: NO SPECIAL REQUESTS      Culture result:        NO GROWTH OF SALMONELLA OR SHIGELLA IS EVIDENT ON FIRST READING, FINAL REPORT TO FOLLOW AFTER FURTHER INCUBATION OF CULTURE   C. DIFFICILE/EPI PCR    Collection Time: 02/23/22 10:11 AM    Specimen: Stool   Result Value Ref Range    Special Requests: NO SPECIAL REQUESTS      Culture result: Toxigenic C. difficile POSITIVE. (A)      Culture result:        RESULTS VERIFIED, PHONED TO AND READ BACK BY  DR. Phineas Mcburney 12:58 02.23.2022 AMM     WBC, STOOL    Collection Time: 02/23/22 10:11 AM   Result Value Ref Range    White blood cells, stool NO WBC'S SEEN 0 - 4 /HPF   CULTURE, URINE    Collection Time: 02/23/22 10:11 AM    Specimen: Clean catch; Urine   Result Value Ref Range    Special Requests: NO SPECIAL REQUESTS      Culture result: (A)       >100,000 COLONIES/mL GRAM NEGATIVE RODS SUBCULTURE IN PROGRESS    Culture result: CULTURE IN PROGRESS,FURTHER UPDATES TO FOLLOW      Culture result: <1,000 CFU/ML NORMAL SKIN DEMARCO ISOLATED     CBC WITH AUTOMATED DIFF    Collection Time: 02/23/22 10:20 AM   Result Value Ref Range    WBC 11.1 4.3 - 11.1 K/uL    RBC 5.14 4.23 - 5.6 M/uL    HGB 14.4 13.6 - 17.2 g/dL    HCT 48.0 41.1 - 50.3 %    MCV 93.4 79.6 - 97.8 FL    MCH 28.0 26.1 - 32.9 PG    MCHC 30.0 (L) 31.4 - 35.0 g/dL    RDW 14.6 11.9 - 14.6 %    PLATELET 237 691 - 613 K/uL    MPV 9.7 9.4 - 12.3 FL    ABSOLUTE NRBC 0.00 0.0 - 0.2 K/uL    DF AUTOMATED      NEUTROPHILS 94 (H) 43 - 78 %    LYMPHOCYTES 2 (L) 13 - 44 %    MONOCYTES 3 (L) 4.0 - 12.0 %    EOSINOPHILS 0 (L) 0.5 - 7.8 %    BASOPHILS 0 0.0 - 2.0 %    IMMATURE GRANULOCYTES 0 0.0 - 5.0 %    ABS. NEUTROPHILS 10.4 (H) 1.7 - 8.2 K/UL    ABS. LYMPHOCYTES 0.2 (L) 0.5 - 4.6 K/UL    ABS. MONOCYTES 0.4 0.1 - 1.3 K/UL    ABS. EOSINOPHILS 0.0 0.0 - 0.8 K/UL    ABS. BASOPHILS 0.0 0.0 - 0.2 K/UL    ABS. IMM.  GRANS. 0.0 0.0 - 0.5 K/UL   METABOLIC PANEL, COMPREHENSIVE Collection Time: 02/23/22 10:20 AM   Result Value Ref Range    Sodium 133 (L) 138 - 145 mmol/L    Potassium 6.5 (HH) 3.5 - 5.1 mmol/L    Chloride 108 (H) 98 - 107 mmol/L    CO2 22 21 - 32 mmol/L    Anion gap 3 (L) 7 - 16 mmol/L    Glucose 124 (H) 65 - 100 mg/dL    BUN 38 (H) 8 - 23 MG/DL    Creatinine 2.10 (H) 0.8 - 1.5 MG/DL    GFR est AA 39 (L) >60 ml/min/1.73m2    GFR est non-AA 33 (L) >60 ml/min/1.73m2    Calcium 8.6 8.3 - 10.4 MG/DL    Bilirubin, total 0.4 0.2 - 1.1 MG/DL    ALT (SGPT) 33 12 - 65 U/L    AST (SGOT) 27 15 - 37 U/L    Alk.  phosphatase 72 50 - 136 U/L    Protein, total 8.3 (H) 6.3 - 8.2 g/dL    Albumin 4.1 3.2 - 4.6 g/dL    Globulin 4.2 (H) 2.3 - 3.5 g/dL    A-G Ratio 1.0 (L) 1.2 - 3.5     GLUCOSE, POC    Collection Time: 02/23/22  1:28 PM   Result Value Ref Range    Glucose (POC) 103 (H) 65 - 100 mg/dL    Performed by [id number]    POTASSIUM    Collection Time: 02/23/22  4:52 PM   Result Value Ref Range    Potassium 5.0 3.5 - 5.1 mmol/L   METABOLIC PANEL, BASIC    Collection Time: 02/24/22  5:21 AM   Result Value Ref Range    Sodium 137 (L) 138 - 145 mmol/L    Potassium 4.5 3.5 - 5.1 mmol/L    Chloride 109 (H) 98 - 107 mmol/L    CO2 21 21 - 32 mmol/L    Anion gap 7 7 - 16 mmol/L    Glucose 93 65 - 100 mg/dL    BUN 50 (H) 8 - 23 MG/DL    Creatinine 2.50 (H) 0.8 - 1.5 MG/DL    GFR est AA 32 (L) >60 ml/min/1.73m2    GFR est non-AA 27 (L) >60 ml/min/1.73m2    Calcium 8.7 8.3 - 10.4 MG/DL   CBC WITH AUTOMATED DIFF    Collection Time: 02/24/22  5:21 AM   Result Value Ref Range    WBC 5.5 4.3 - 11.1 K/uL    RBC 4.88 4.23 - 5.6 M/uL    HGB 13.5 (L) 13.6 - 17.2 g/dL    HCT 44.4 41.1 - 50.3 %    MCV 91.0 79.6 - 97.8 FL    MCH 27.7 26.1 - 32.9 PG    MCHC 30.4 (L) 31.4 - 35.0 g/dL    RDW 14.6 11.9 - 14.6 %    PLATELET 527 492 - 158 K/uL    MPV 9.6 9.4 - 12.3 FL    ABSOLUTE NRBC 0.00 0.0 - 0.2 K/uL    DF AUTOMATED      NEUTROPHILS 80 (H) 43 - 78 %    LYMPHOCYTES 6 (L) 13 - 44 %    MONOCYTES 13 (H) 4.0 - 12.0 %    EOSINOPHILS 0 (L) 0.5 - 7.8 %    BASOPHILS 1 0.0 - 2.0 %    IMMATURE GRANULOCYTES 0 0.0 - 5.0 %    ABS. NEUTROPHILS 4.4 1.7 - 8.2 K/UL    ABS. LYMPHOCYTES 0.3 (L) 0.5 - 4.6 K/UL    ABS. MONOCYTES 0.7 0.1 - 1.3 K/UL    ABS. EOSINOPHILS 0.0 0.0 - 0.8 K/UL    ABS. BASOPHILS 0.0 0.0 - 0.2 K/UL    ABS. IMM. GRANS. 0.0 0.0 - 0.5 K/UL       All Micro Results     Procedure Component Value Units Date/Time    CULTURE, STOOL [[id number]] Collected: 02/23/22 1011    Order Status: Completed Specimen: Stool Updated: 02/24/22 0955     Special Requests: NO SPECIAL REQUESTS        Culture result:       NO GROWTH OF SALMONELLA OR SHIGELLA IS EVIDENT ON FIRST READING, FINAL REPORT TO FOLLOW AFTER FURTHER INCUBATION OF CULTURE          CULTURE, URINE [[id number]]  (Abnormal) Collected: 02/23/22 1011    Order Status: Completed Specimen: Urine from Clean catch Updated: 02/24/22 0633     Special Requests: NO SPECIAL REQUESTS        Culture result:       >100,000 COLONIES/mL GRAM NEGATIVE RODS SUBCULTURE IN PROGRESS                  CULTURE IN PROGRESS,FURTHER UPDATES TO FOLLOW                  <1,000 CFU/ML NORMAL SKIN DEMARCO ISOLATED          C. DIFFICILE/EPI PCR [[id number]]  (Abnormal) Collected: 02/23/22 1011    Order Status: Completed Specimen: Stool Updated: 02/23/22 1304     Special Requests: NO SPECIAL REQUESTS        Culture result:       Toxigenic C. difficile POSITIVE. RESULTS VERIFIED, PHONED TO AND READ BACK BY  DR. Whittington Roles 12:58 02.23.2022 [facility]      OVA & PARASITES, STOOL [[id number]] Collected: 02/23/22 1011    Order Status: Completed Specimen: Feces from Stool Updated: 02/23/22 1123          Other Studies:  US RETROPERITONEUM LTD    Result Date: 2/23/2022  EXAM: Ultrasound of the kidneys. INDICATION: Acute renal insufficiency. COMPARISON: Prior CT abdomen on November 8, 2021. TECHNIQUE: A standard protocol kidney ultrasound was performed.  FINDINGS: The left kidney measures 10.9 cm in length and has a normal appearance. The right kidney measures 10.1 cm in length and contains 3 small stones in its mid and lower pole, the largest measuring 4 mm. There is no right kidney hydronephrosis. The urinary bladder was obscured by the patient's colostomy. 1. No evidence of hydronephrosis. 2. Right kidney stones. 3. Normal left kidney. Current Meds:  Current Facility-Administered Medications   Medication Dose Route Frequency    sodium chloride (NS) flush 5-10 mL  5-10 mL IntraVENous Q8H    sodium chloride (NS) flush 5-10 mL  5-10 mL IntraVENous PRN    sodium chloride (NS) flush 5-40 mL  5-40 mL IntraVENous Q8H    sodium chloride (NS) flush 5-40 mL  5-40 mL IntraVENous PRN    acetaminophen (TYLENOL) tablet 650 mg  650 mg Oral Q6H PRN    Or    acetaminophen (TYLENOL) suppository 650 mg  650 mg Rectal Q6H PRN    polyethylene glycol (MIRALAX) packet 17 g  17 g Oral DAILY PRN    ondansetron (ZOFRAN ODT) tablet 4 mg  4 mg Oral Q8H PRN    Or    ondansetron (ZOFRAN) injection 4 mg  4 mg IntraVENous Q6H PRN    piperacillin-tazobactam (ZOSYN) 3.375 g in 0.9% sodium chloride (MBP/ADV) 100 mL MBP  3.375 g IntraVENous Q8H    vancomycin 50 mg/mL oral solution (compounded) 125 mg  125 mg Oral Q6H    tuberculin injection 5 Units  5 Units IntraDERMal ONCE    amitriptyline (ELAVIL) tablet 100 mg  100 mg Oral QHS    carvediloL (COREG) tablet 6.25 mg  6.25 mg Oral BID WITH MEALS    clonazePAM (KlonoPIN) tablet 4 mg  4 mg Oral QHS    levothyroxine (SYNTHROID) tablet 125 mcg  125 mcg Oral 6am    OLANZapine (ZyPREXA) tablet 7.5 mg  7.5 mg Oral QHS    0.9% sodium chloride infusion  150 mL/hr IntraVENous CONTINUOUS       Signed:  Zeke Adrian MD    Part of this note may have been written by using a voice dictation software. The note has been proof read but may still contain some grammatical/other typographical errors.

## 2022-02-24 NOTE — PROGRESS NOTES
Spoke to Mr. Marroquin in room 603 about discharge planning. Prior to this admission, he was independent with ADLs. He lives alone in the [place] area and is able to drive. He said he has had an ostomy since 2014 and is independent with ostomy care. In addition to his PCP, he sees Dr. Washington Murphy at [facility]. Case Management will follow and assist with discharge planning. Care Management Interventions  PCP Verified by CM:  Yes  Physical Therapy Consult: Yes  Support Systems: Friend/Neighbor  Confirm Follow Up Transport: Self

## 2022-02-25 LAB
ANION GAP SERPL CALC-SCNC: 9 MMOL/L (ref 7–16)
BACTERIA SPEC CULT: NORMAL
BUN SERPL-MCNC: 50 MG/DL (ref 8–23)
CALCIUM SERPL-MCNC: 8.4 MG/DL (ref 8.3–10.4)
CHLORIDE SERPL-SCNC: 116 MMOL/L (ref 98–107)
CO2 SERPL-SCNC: 20 MMOL/L (ref 21–32)
CREAT SERPL-MCNC: 2 MG/DL (ref 0.8–1.5)
GLUCOSE SERPL-MCNC: 98 MG/DL (ref 65–100)
MM INDURATION POC: 0 MM (ref 0–5)
O+P SPEC MICRO: NORMAL
O+P STL CONC: NORMAL
POTASSIUM SERPL-SCNC: 4 MMOL/L (ref 3.5–5.1)
PPD POC: NEGATIVE NEGATIVE
SERVICE CMNT-IMP: NORMAL
SODIUM SERPL-SCNC: 145 MMOL/L (ref 138–145)
SPECIMEN SOURCE: NORMAL

## 2022-02-25 PROCEDURE — 2709999900 HC NON-CHARGEABLE SUPPLY

## 2022-02-25 PROCEDURE — 97110 THERAPEUTIC EXERCISES: CPT

## 2022-02-25 PROCEDURE — 74011000250 HC RX REV CODE- 250: Performed by: FAMILY MEDICINE

## 2022-02-25 PROCEDURE — 97530 THERAPEUTIC ACTIVITIES: CPT

## 2022-02-25 PROCEDURE — 80048 BASIC METABOLIC PNL TOTAL CA: CPT

## 2022-02-25 PROCEDURE — 74011000250 HC RX REV CODE- 250: Performed by: EMERGENCY MEDICINE

## 2022-02-25 PROCEDURE — 74011000258 HC RX REV CODE- 258: Performed by: FAMILY MEDICINE

## 2022-02-25 PROCEDURE — 74011250636 HC RX REV CODE- 250/636: Performed by: FAMILY MEDICINE

## 2022-02-25 PROCEDURE — 74011250637 HC RX REV CODE- 250/637: Performed by: FAMILY MEDICINE

## 2022-02-25 PROCEDURE — 74011250636 HC RX REV CODE- 250/636: Performed by: INTERNAL MEDICINE

## 2022-02-25 PROCEDURE — 36415 COLL VENOUS BLD VENIPUNCTURE: CPT

## 2022-02-25 PROCEDURE — 74011000250 HC RX REV CODE- 250: Performed by: INTERNAL MEDICINE

## 2022-02-25 PROCEDURE — 65270000029 HC RM PRIVATE

## 2022-02-25 RX ORDER — DEXTROSE MONOHYDRATE AND SODIUM CHLORIDE 5; .45 G/100ML; G/100ML
100 INJECTION, SOLUTION INTRAVENOUS CONTINUOUS
Status: DISCONTINUED | OUTPATIENT
Start: 2022-02-25 | End: 2022-02-26

## 2022-02-25 RX ADMIN — SODIUM CHLORIDE, PRESERVATIVE FREE 10 ML: 5 INJECTION INTRAVENOUS at 05:41

## 2022-02-25 RX ADMIN — VANCOMYCIN HYDROCHLORIDE 125 MG: 5 INJECTION, POWDER, LYOPHILIZED, FOR SOLUTION INTRAVENOUS at 11:51

## 2022-02-25 RX ADMIN — OLANZAPINE 7.5 MG: 2.5 TABLET, FILM COATED ORAL at 21:40

## 2022-02-25 RX ADMIN — CLONAZEPAM 4 MG: 1 TABLET ORAL at 21:40

## 2022-02-25 RX ADMIN — VANCOMYCIN HYDROCHLORIDE 125 MG: 5 INJECTION, POWDER, LYOPHILIZED, FOR SOLUTION INTRAVENOUS at 05:28

## 2022-02-25 RX ADMIN — PIPERACILLIN SODIUM,TAZOBACTAM SODIUM 3.38 G: 3; .375 INJECTION, POWDER, FOR SOLUTION INTRAVENOUS at 08:16

## 2022-02-25 RX ADMIN — SODIUM CHLORIDE, PRESERVATIVE FREE 10 ML: 5 INJECTION INTRAVENOUS at 14:31

## 2022-02-25 RX ADMIN — PIPERACILLIN SODIUM,TAZOBACTAM SODIUM 3.38 G: 3; .375 INJECTION, POWDER, FOR SOLUTION INTRAVENOUS at 00:53

## 2022-02-25 RX ADMIN — SODIUM CHLORIDE, PRESERVATIVE FREE 10 ML: 5 INJECTION INTRAVENOUS at 05:42

## 2022-02-25 RX ADMIN — SODIUM CHLORIDE, PRESERVATIVE FREE 10 ML: 5 INJECTION INTRAVENOUS at 14:00

## 2022-02-25 RX ADMIN — CARVEDILOL 6.25 MG: 3.12 TABLET, FILM COATED ORAL at 08:10

## 2022-02-25 RX ADMIN — LEVOTHYROXINE SODIUM 125 MCG: 0.07 TABLET ORAL at 05:28

## 2022-02-25 RX ADMIN — SODIUM CHLORIDE 150 ML/HR: 900 INJECTION, SOLUTION INTRAVENOUS at 00:53

## 2022-02-25 RX ADMIN — ACETAMINOPHEN 650 MG: 500 TABLET, FILM COATED ORAL at 11:44

## 2022-02-25 RX ADMIN — DEXTROSE MONOHYDRATE AND SODIUM CHLORIDE 100 ML/HR: 5; .45 INJECTION, SOLUTION INTRAVENOUS at 08:12

## 2022-02-25 RX ADMIN — SODIUM CHLORIDE, PRESERVATIVE FREE 10 ML: 5 INJECTION INTRAVENOUS at 14:29

## 2022-02-25 RX ADMIN — DEXTROSE MONOHYDRATE AND SODIUM CHLORIDE 100 ML/HR: 5; .45 INJECTION, SOLUTION INTRAVENOUS at 21:18

## 2022-02-25 RX ADMIN — CARVEDILOL 6.25 MG: 3.12 TABLET, FILM COATED ORAL at 17:11

## 2022-02-25 RX ADMIN — VANCOMYCIN HYDROCHLORIDE 125 MG: 5 INJECTION, POWDER, LYOPHILIZED, FOR SOLUTION INTRAVENOUS at 17:11

## 2022-02-25 NOTE — PROGRESS NOTES
ACUTE OT GOALS:  (Developed with and agreed upon by patient and/or caregiver.)  1. Pt will toilet with SBA   2. Pt will complete functional mobility for ADLs with SBA using AD as needed  3. Pt will complete lower body dressing with SBA using AE as needed  4. Pt will complete grooming and hygiene at sink with SBA  5. Pt will demonstrate independence with HEP to promote increased BUE strength and functional use for ADLs   ADLs     Timeframe: 7 days    OCCUPATIONAL THERAPY: Daily Note OT Treatment Day # 2    Brenda Verduzco is a 64 y.o. male   PRIMARY DIAGNOSIS: REBEKAH (acute kidney injury) ([facility]  C. difficile colitis [W70.58]  Complicated UTI (urinary tract infection) [N39.0]       [place] / [place] / Product Type: Medicare /   ASSESSMENT:     REHAB RECOMMENDATIONS: CURRENT LEVEL OF FUNCTION:  (Most Recently Demonstrated)   Recommendation to date pending progress:  [address] [place] [address] Therapy  Equipment:    3 in 1 Bedside Commode Bathing:   Not tested  Dressing:   Not tested  Feeding/Grooming:   Not tested  Toileting:   Independent  Functional Mobility:   Supervision     ASSESSMENT:  Mr. Jacob Astudillo demonstrates excellent progress towards goals. Pt demonstrated mobility w/ SBA, reports that he has been up and emptying ostomy bag in bathroom independently. Pt issued red theraband and HEP and returned demonstration w/ min cues. Continue POC. SUBJECTIVE:   Mr. Jacob Astudillo reports feeling much stronger today.     SOCIAL HISTORY/LIVING ENVIRONMENT:   Support Systems: Friend/Neighbor    OBJECTIVE:     PAIN: VITAL SIGNS: LINES/DRAINS:   Pre Treatment: Pain Screen  Pain Scale 1: Numeric (0 - 10)  Pain Intensity 1: 0  Post Treatment: 0   IV  O2 Device: None (Room air)     ACTIVITIES OF DAILY LIVING: I Mod I S SBA CGA Min Mod Max Total NT Comments   BASIC ADLs:              Bathing/ Showering [] [] [] [] [] [] [] [] [] []    Toileting [] [] [] [] [] [] [] [] [] []    Dressing [] [] [] [] [] [] [] [] [] []    Feeding [] [] [] [] [] [] [] [] [] []    Grooming [] [] [] [] [] [] [] [] [] []    Personal Device Care [] [] [] [] [] [] [] [] [] []    Functional Mobility [] [] [] [] [] [] [] [] [] []    I=Independent, Mod I=Modified Independent, S=Supervision, SBA=Standby Assistance, CGA=Contact Guard Assistance,   Min=Minimal Assistance, Mod=Moderate Assistance, Max=Maximal Assistance, Total=Total Assistance, NT=Not Tested    MOBILITY: I Mod I S SBA CGA Min Mod Max Total  NT x2 Comments:   Supine to sit [x] [] [] [] [] [] [] [] [] [] []    Sit to supine [x] [] [] [] [] [] [] [] [] [] []    Sit to stand [] [] [x] [] [] [] [] [] [] [] []    Bed to chair [] [] [x] [] [] [] [] [] [] [] []    I=Independent, Mod I=Modified Independent, S=Supervision, SBA=Standby Assistance, CGA=Contact Guard Assistance,   Min=Minimal Assistance, Mod=Moderate Assistance, Max=Maximal Assistance, Total=Total Assistance, NT=Not Tested     PLAN:   FREQUENCY/DURATION: OT Plan of Care: 3 times/week for duration of hospital stay or until stated goals are met, whichever comes first.    TREATMENT:   TREATMENT:   ( $$ Therapeutic Exercises: 8-22 mins   )  Therapeutic Exercise (13 Minutes): Therapeutic exercises noted below to improve functional activity tolerance and strength.      TREATMENT GRID:   Date:  2/25 Date:   Date:     Activity/Exercise Parameters Parameters Parameters   Shoulder ab/adduction 10/1     Shoulder flexion 10/1     Elbow flexion 10/1     Elbow extension 10/1                             AFTER TREATMENT POSITION/PRECAUTIONS:  Bed, Needs within reach and RN notified    INTERDISCIPLINARY COLLABORATION:  RN/PCT    TOTAL TREATMENT DURATION:  OT Patient Time In/Time Out  Time In: 1444  Time Out: [address] Starch

## 2022-02-25 NOTE — WOUND CARE
Patient uses humphrey soft convexity with adapt ring at home usually get 5-7 day wear time, since cris c-diff have had difficulty with keeping pouch in place. Patient voiced that the hospital bags are not the same as his at home, encouraged patient to bring his from home if they work better. Patient notified that ostomy nurses can be called for assistance with pouch during normal business hours, verbalized understanding. Will add kailey rings and 1 piece pouches as they may work better for patient while here and patient can return to using his pouches after discharge. Pouch today is well adhered and without leaks. Patient asks for ostomy nurses to check on him after the weekend to be sure he is not having difficulty with leaking, agreed. Please call for needs.

## 2022-02-25 NOTE — ROUTINE PROCESS
Bedside and Verbal shift change report given to [facility], RN (oncoming nurse) by Rick Leiva RN (offgoing nurse). Report included the following information SBAR, Kardex, Accordion, Recent Results and Med Rec Status.

## 2022-02-25 NOTE — PROGRESS NOTES
Hospitalist Progress Note   Admit Date:  2022  9:55 AM   Name:  Ashutosh Marroquin   Age:  64 y.o. Sex:  male  :  1945   MRN:  [id number]   Room:  603/01    Presenting Complaint: Diarrhea    Reason(s) for Admission: C. difficile colitis [Y40.76]  Complicated UTI (urinary tract infection) [N39.0]     Hospital Course & Interval History:   Mr. Yajaira Lawson is a nice 65 y/o WM with a h/o DVT, rectal cancer s/p sigmoidectomy now with colostomy, MDD, urinary retention with chronic Bishop who presented to the ER on  with about a 1 day history of non-bloodoy diarrhea. No recent abx or hospitalizations. Labs showed REBEKAH. Cdiff positive, started on oral vancomycin. UA with pyuria so empirically started on Zosyn, culture growing GNRs but given chronic Bishop and prior UA results likely represents colonization. Subjective/24hr Events (22):  Up to chair, still having some increased watery output in his ostomy. His Cr has improved and he says he's making a little more urine compared to yesterday. Appetite is ok, no N/V, SOB or chest pain. ROS:  10 systems reviewed and negative except as noted above. Assessment & Plan:   # REBEKAH              - Suspect pre-renal due to GI losses. Cr up to 2.5 yesterday but has improved to 2 today with a slight increase in urine output. Con't IVFs, PO intake. Change fluids to D5 1/2NS with jump in Na.     # Diarrhea 2/2 Cdiff colitis              - Con't PO vancomycin, EOT 3/5. May still have some ongoing diarrhea due to abx which will be d/c as below.     # Pyuria              - Has chronic Bishop for chronic urinary retention,, UAs consistently show pyuria. Urine culture growing GNRs which likely represents colonization.  Will dc abx and observe, Bishop will be changed prior to discharge.      # HyperK              - Resolved.     # Hypothyroidism              - Synthroid     # HTN              - Con't Coreg     # Anxiety d/o              - Klonopin (normal dose 6mg QHS reduced to 4mg with REBEKAH). Verified active Rx through [facility] .    # Chronic urinary retention              - Chronic brito. Dispo/Discharge Planning: Home when able, 1-2d pending further improvement.   Diet:  ADULT DIET Regular; Low Potassium (Less than 3000 mg/day)  DVT PPx: Ambulation  Code status: Full Code    Hospital Problems as of 2/25/2022 Date Reviewed: 12/10/2021          Codes Class Noted - Resolved POA    Pyuria ICD-10-CM: R82.81  ICD-9-CM: 791.9  2/24/2022 - Present Yes        C. difficile colitis ICD-10-CM: A04.72  ICD-9-CM: 008.45  2/23/2022 - Present Yes        * (Principal) REBEKAH (acute kidney injury) ([facility] [address] ICD-10-CM: N17.9  ICD-9-CM: 584.9  9/15/2021 - Present Yes        H/O urinary retention ICD-10-CM: Z87.898  ICD-9-CM: V13.09  8/27/2021 - Present Yes        Anxiety ICD-10-CM: F41.9  ICD-9-CM: 300.00  5/11/2021 - Present Yes        Major depression ICD-10-CM: F32.9  ICD-9-CM: 296.20  Unknown - Present Yes        Hypothyroid ICD-10-CM: E03.9  ICD-9-CM: 244.9  6/14/2019 - Present Yes    Overview Signed 6/14/2019 12:12 AM by Becky Amador MD     stable w/med             Chronic indwelling Brito catheter ICD-10-CM: Z97.8  ICD-9-CM: V45.89  5/19/2019 - Present Yes        RESOLVED: Hyperkalemia ICD-10-CM: E87.5  ICD-9-CM: 276.7  9/15/2021 - 2/24/2022 Yes              Objective:     Patient Vitals for the past 24 hrs:   Temp Pulse Resp BP SpO2   02/25/22 0722 98 °F (36.7 °C) 73 17 117/76 97 %   02/25/22 0317 98.3 °F (36.8 °C) 79 18 134/76 97 %   02/24/22 2333 98 °F (36.7 °C) 72 18 127/74 100 %   02/24/22 1938 97.6 °F (36.4 °C) 75 18 125/87 100 %   02/24/22 1601 97.4 °F (36.3 °C) 60 15 123/85 100 %   02/24/22 1102 97.2 °F (36.2 °C) 100 20 92/69 95 %     Oxygen Therapy  O2 Sat (%): 97 % (02/25/22 0722)  Pulse via Oximetry: 73 beats per minute (02/25/22 0722)  O2 Device: None (Room air) (02/25/22 0722)    Estimated body mass index is 19.92 kg/m² as calculated from the following:    Height as of this encounter: 5' 8\" (1.727 m). Weight as of this encounter: 59.4 kg (131 lb). Intake/Output Summary (Last 24 hours) at 2/25/2022 1041  Last data filed at 2/25/2022 3467  Gross per 24 hour   Intake    Output 1890 ml   Net -1890 ml         Physical Exam:   Blood pressure 117/76, pulse 73, temperature 98 °F (36.7 °C), resp. rate 17, height 5' 8\" (1.727 m), weight 59.4 kg (131 lb), SpO2 97 %. General:    Thin, chronically ill appearing. No overt distress  Head:  Normocephalic, atraumatic  Eyes:  Sclerae appear normal.  Pupils equally round. ENT:  Nares appear normal, no drainage. Dry oral mucosa  Neck:  No restricted ROM. Trachea midline   CV:   RRR. No m/r/g. No jugular venous distension. Lungs:   CTAB. No wheezing, rhonchi, or rales. Respirations even, unlabored. Abdomen: Bowel sounds present. Soft, nontender, nondistended. Ostomy with watery brown stool in bag. Extremities: No cyanosis or clubbing. No edema  Skin:     No rashes and normal coloration. Warm and dry. Neuro:  CN II-XII grossly intact. Sensation intact. A&Ox3  Psych:  Normal mood and affect.       I have reviewed ordered lab tests and independently visualized imaging below:    Recent Labs:  Recent Results (from the past 48 hour(s))   GLUCOSE, POC    Collection Time: 02/23/22  1:28 PM   Result Value Ref Range    Glucose (POC) 103 (H) 65 - 100 mg/dL    Performed by [id number]    POTASSIUM    Collection Time: 02/23/22  4:52 PM   Result Value Ref Range    Potassium 5.0 3.5 - 5.1 mmol/L   METABOLIC PANEL, BASIC    Collection Time: 02/24/22  5:21 AM   Result Value Ref Range    Sodium 137 (L) 138 - 145 mmol/L    Potassium 4.5 3.5 - 5.1 mmol/L    Chloride 109 (H) 98 - 107 mmol/L    CO2 21 21 - 32 mmol/L    Anion gap 7 7 - 16 mmol/L    Glucose 93 65 - 100 mg/dL    BUN 50 (H) 8 - 23 MG/DL    Creatinine 2.50 (H) 0.8 - 1.5 MG/DL    GFR est AA 32 (L) >60 ml/min/1.73m2    GFR est non-AA 27 (L) >60 ml/min/1.73m2    Calcium 8.7 8.3 - 10.4 MG/DL   CBC WITH AUTOMATED DIFF    Collection Time: 02/24/22  5:21 AM   Result Value Ref Range    WBC 5.5 4.3 - 11.1 K/uL    RBC 4.88 4.23 - 5.6 M/uL    HGB 13.5 (L) 13.6 - 17.2 g/dL    HCT 44.4 41.1 - 50.3 %    MCV 91.0 79.6 - 97.8 FL    MCH 27.7 26.1 - 32.9 PG    MCHC 30.4 (L) 31.4 - 35.0 g/dL    RDW 14.6 11.9 - 14.6 %    PLATELET 012 285 - 735 K/uL    MPV 9.6 9.4 - 12.3 FL    ABSOLUTE NRBC 0.00 0.0 - 0.2 K/uL    DF AUTOMATED      NEUTROPHILS 80 (H) 43 - 78 %    LYMPHOCYTES 6 (L) 13 - 44 %    MONOCYTES 13 (H) 4.0 - 12.0 %    EOSINOPHILS 0 (L) 0.5 - 7.8 %    BASOPHILS 1 0.0 - 2.0 %    IMMATURE GRANULOCYTES 0 0.0 - 5.0 %    ABS. NEUTROPHILS 4.4 1.7 - 8.2 K/UL    ABS. LYMPHOCYTES 0.3 (L) 0.5 - 4.6 K/UL    ABS. MONOCYTES 0.7 0.1 - 1.3 K/UL    ABS. EOSINOPHILS 0.0 0.0 - 0.8 K/UL    ABS. BASOPHILS 0.0 0.0 - 0.2 K/UL    ABS. IMM.  GRANS. 0.0 0.0 - 0.5 K/UL   METABOLIC PANEL, BASIC    Collection Time: 02/25/22  5:30 AM   Result Value Ref Range    Sodium 145 138 - 145 mmol/L    Potassium 4.0 3.5 - 5.1 mmol/L    Chloride 116 (H) 98 - 107 mmol/L    CO2 20 (L) 21 - 32 mmol/L    Anion gap 9 7 - 16 mmol/L    Glucose 98 65 - 100 mg/dL    BUN 50 (H) 8 - 23 MG/DL    Creatinine 2.00 (H) 0.8 - 1.5 MG/DL    GFR est AA 42 (L) >60 ml/min/1.73m2    GFR est non-AA 35 (L) >60 ml/min/1.73m2    Calcium 8.4 8.3 - 10.4 MG/DL   PLEASE READ & DOCUMENT PPD TEST IN 24 HRS    Collection Time: 02/25/22  5:44 AM   Result Value Ref Range    PPD Negative Negative    mm Induration 0 0 - 5 mm       All Micro Results     Procedure Component Value Units Date/Time    CULTURE, URINE [[id number]]  (Abnormal) Collected: 02/23/22 1011    Order Status: Completed Specimen: Urine from Clean catch Updated: 02/25/22 0817     Special Requests: NO SPECIAL REQUESTS        Culture result:       >100,000 COLONIES/mL GRAM NEGATIVE RODS IDENTIFICATION AND SUSCEPTIBILITY TO FOLLOW                  CULTURE IN PROGRESS,FURTHER UPDATES TO FOLLOW                  <1,000 CFU/ML NORMAL SKIN DEMARCO ISOLATED          CULTURE, STOOL [[id number]] Collected: 02/23/22 1011    Order Status: Completed Specimen: Stool Updated: 02/25/22 0734     Special Requests: NO SPECIAL REQUESTS        Culture result:       No Salmonella, Shigella, or Ecoli 0157 isolated. C. DIFFICILE/EPI PCR [[id number]]  (Abnormal) Collected: 02/23/22 1011    Order Status: Completed Specimen: Stool Updated: 02/23/22 1304     Special Requests: NO SPECIAL REQUESTS        Culture result:       Toxigenic C. difficile POSITIVE. RESULTS VERIFIED, PHONED TO AND READ BACK BY  DR. Patrick Vann 12:58 02.23.2022 AMM      OVA & PARASITES, STOOL [[id number]] Collected: 02/23/22 1011    Order Status: Completed Specimen: Feces from Stool Updated: 02/23/22 1123          Other Studies:  No results found.     Current Meds:  Current Facility-Administered Medications   Medication Dose Route Frequency    dextrose 5 % - 0.45% NaCl infusion  100 mL/hr IntraVENous CONTINUOUS    sodium chloride (NS) flush 5-10 mL  5-10 mL IntraVENous Q8H    sodium chloride (NS) flush 5-10 mL  5-10 mL IntraVENous PRN    sodium chloride (NS) flush 5-40 mL  5-40 mL IntraVENous Q8H    sodium chloride (NS) flush 5-40 mL  5-40 mL IntraVENous PRN    acetaminophen (TYLENOL) tablet 650 mg  650 mg Oral Q6H PRN    Or    acetaminophen (TYLENOL) suppository 650 mg  650 mg Rectal Q6H PRN    polyethylene glycol (MIRALAX) packet 17 g  17 g Oral DAILY PRN    ondansetron (ZOFRAN ODT) tablet 4 mg  4 mg Oral Q8H PRN    Or    ondansetron (ZOFRAN) injection 4 mg  4 mg IntraVENous Q6H PRN    piperacillin-tazobactam (ZOSYN) 3.375 g in 0.9% sodium chloride (MBP/ADV) 100 mL MBP  3.375 g IntraVENous Q8H    vancomycin 50 mg/mL oral solution (compounded) 125 mg  125 mg Oral Q6H    amitriptyline (ELAVIL) tablet 100 mg  100 mg Oral QHS    carvediloL (COREG) tablet 6.25 mg  6.25 mg Oral BID WITH MEALS    clonazePAM (KlonoPIN) tablet 4 mg  4 mg Oral QHS    levothyroxine (SYNTHROID) tablet 125 mcg  125 mcg Oral 6am    OLANZapine (ZyPREXA) tablet 7.5 mg  7.5 mg Oral QHS       Signed:  Armaan Huerta MD    Part of this note may have been written by using a voice dictation software. The note has been proof read but may still contain some grammatical/other typographical errors.

## 2022-02-25 NOTE — PROGRESS NOTES
Problem: Risk for Spread of Infection  Goal: Prevent transmission of infectious organism to others  Description: Prevent the transmission of infectious organisms to other patients, staff members, and visitors. Outcome: Progressing Towards Goal     Problem: Patient Education:  Go to Education Activity  Goal: Patient/Family Education  Outcome: Progressing Towards Goal     Problem: Falls - Risk of  Goal: *Absence of Falls  Description: Document Manor Fall Risk and appropriate interventions in the flowsheet.   Outcome: Progressing Towards Goal  Note: Fall Risk Interventions:            Medication Interventions: Patient to call before getting OOB         History of Falls Interventions: Bed/chair exit alarm

## 2022-02-25 NOTE — PROGRESS NOTES
ACUTE PHYSICAL THERAPY GOALS:  (Developed with and agreed upon by patient and/or caregiver.)  1. Pt will perform bed mobility (I) without cueing  in 7 therapy sessions. 2. Pt will perform all transfers under (S) without cueing in 7 therapy sessions. 3. Pt will ambulate 500 ft under (S) with use of LRAD and 1-2 breaks as needed in 7 therapy sessions. 4. Pt will perform standing balance activities with minimal postural sway in 7 therapy sessions. 5. Pt will tolerate multiple sets and reps of BLE exercises in 7 therapy sessions. PHYSICAL THERAPY: Daily Note and PM Treatment Day # 2    Elsy Ruth is a 64 y.o. male   PRIMARY DIAGNOSIS: REBEKAH (acute kidney injury) ([facility] [address]  C. difficile colitis [G58.98]  Complicated UTI (urinary tract infection) [N39.0]         ASSESSMENT:     REHAB RECOMMENDATIONS: CURRENT LEVEL OF FUNCTION:  (Most Recently Demonstrated)   Recommendation to date pending progress:  [address] [place] [address]  Equipment:    To Be Determined Bed Mobility:   Independent  Sit to Stand:  [employer] Assistance  Transfers:   Standby Assistance  Gait/Mobility:   Contact Guard Assistance     ASSESSMENT:  Mr. Florin Walters was supine in bed on RA upon entering the room and agreeable to therapy. Today, pt demonstrates GOOD progress toward established goals as he made notable improvements in activity tolerance and did-so without inc level of (A) from therapy staff. This session, pt was (I) for bed mobility, SB (A) for sit-to-stand/ transfers and CG (A) c use of gait belt for ambulation. This session, pt able to inc ambulation distance to 500'x1. His gait remains slow c inc postural sway however he does well to remain steady with no LOB or miss-steps noted throughout trial. Pt denying any complaints/ concerns while moving about. At this time, pt remains a good candidate for skilled PT and will continue to benefit from advancing POC.  At end of session, pt was positioned to comfort in bed with all needs in hany. RN was made aware of pt performance.       SUBJECTIVE:   Mr. Dipti Neves states, \"Thanks for coming by\"    SOCIAL HISTORY/ LIVING ENVIRONMENT: See Eval  Support Systems: Friend/Neighbor  OBJECTIVE:     PAIN: VITAL SIGNS: LINES/DRAINS:   Pre Treatment: Pain Screen  Pain Scale 1: Numeric (0 - 10)  Pain Intensity 1: 0  Patient Stated Pain Goal: 0  Post Treatment: 0   Colostomy and IV  O2 Device: None (Room air)     MOBILITY: I Mod I S SBA CGA Min Mod Max Total  NT x2 Comments:   Bed Mobility    Rolling [] [] [] [] [] [] [] [] [] [x] []    Supine to Sit [x] [] [] [] [] [] [] [] [] [] []    Scooting [x] [] [] [] [] [] [] [] [] [] []    Sit to Supine [x] [] [] [] [] [] [] [] [] [] []    Transfers    Sit to Stand [] [] [] [x] [] [] [] [] [] [] []    Bed to Chair [] [] [] [x] [] [] [] [] [] [] []    Stand to Sit [] [] [] [x] [] [] [] [] [] [] []    I=Independent, Mod I=Modified Independent, S=Supervision, SBA=Standby Assistance, CGA=Contact Guard Assistance,   Min=Minimal Assistance, Mod=Moderate Assistance, Max=Maximal Assistance, Total=Total Assistance, NT=Not Tested    BALANCE: Good Fair+ Fair Fair- Poor NT Comments   Sitting Static [x] [] [] [] [] []    Sitting Dynamic [x] [] [] [] [] []              Standing Static [x] [] [] [] [] []    Standing Dynamic [] [x] [] [] [] []      GAIT: I Mod I S SBA CGA Min Mod Max Total  NT x2 Comments:   Level of Assistance [] [] [] [] [x] [] [] [] [] [] []    Distance 500'x1    DME Gait Belt    Gait Quality Wide ELIAZAR, inc postural sway, dec margot    Weightbearing  Status N/A     I=Independent, Mod I=Modified Independent, S=Supervision, SBA=Standby Assistance, CGA=Contact Guard Assistance,   Min=Minimal Assistance, Mod=Moderate Assistance, Max=Maximal Assistance, Total=Total Assistance, NT=Not Tested    PLAN:   FREQUENCY/DURATION: PT Plan of Care: 3 times/week for duration of hospital stay or until stated goals are met, whichever comes first.  TREATMENT:     TREATMENT:   ($$ Therapeutic Activity: 23-37 mins    )  Therapeutic Activity (23 Minutes): Therapeutic activity included Supine to Sit, Sit to Supine, Scooting, Lateral Scooting, Ambulation on level ground, Sitting balance  and Standing balance to improve functional Mobility, Strength and Activity tolerance.     TREATMENT GRID:  N/A    AFTER TREATMENT POSITION/PRECAUTIONS:  Bed, Needs within reach, RN notified and Visitors at bedside    INTERDISCIPLINARY COLLABORATION:  RN/PCT and PT/PTA    TOTAL TREATMENT DURATION:  PT Patient Time In/Time Out  Time In: 1400  Time Out: [address], PT

## 2022-02-26 LAB
ANION GAP SERPL CALC-SCNC: 7 MMOL/L (ref 7–16)
BUN SERPL-MCNC: 38 MG/DL (ref 8–23)
CALCIUM SERPL-MCNC: 7.8 MG/DL (ref 8.3–10.4)
CHLORIDE SERPL-SCNC: 116 MMOL/L (ref 98–107)
CO2 SERPL-SCNC: 21 MMOL/L (ref 21–32)
CREAT SERPL-MCNC: 1.4 MG/DL (ref 0.8–1.5)
GLUCOSE SERPL-MCNC: 100 MG/DL (ref 65–100)
MM INDURATION POC: 0 MM (ref 0–5)
MM INDURATION POC: NORMAL (ref 0–5)
POTASSIUM SERPL-SCNC: 3.6 MMOL/L (ref 3.5–5.1)
PPD POC: NEGATIVE NEGATIVE
PPD POC: NORMAL
SODIUM SERPL-SCNC: 144 MMOL/L (ref 136–145)

## 2022-02-26 PROCEDURE — 74011250637 HC RX REV CODE- 250/637: Performed by: INTERNAL MEDICINE

## 2022-02-26 PROCEDURE — 36415 COLL VENOUS BLD VENIPUNCTURE: CPT

## 2022-02-26 PROCEDURE — 74011250636 HC RX REV CODE- 250/636: Performed by: FAMILY MEDICINE

## 2022-02-26 PROCEDURE — 65270000029 HC RM PRIVATE

## 2022-02-26 PROCEDURE — 74011000250 HC RX REV CODE- 250: Performed by: FAMILY MEDICINE

## 2022-02-26 PROCEDURE — 80048 BASIC METABOLIC PNL TOTAL CA: CPT

## 2022-02-26 PROCEDURE — 74011000250 HC RX REV CODE- 250: Performed by: EMERGENCY MEDICINE

## 2022-02-26 PROCEDURE — 74011250637 HC RX REV CODE- 250/637: Performed by: FAMILY MEDICINE

## 2022-02-26 RX ORDER — CLONAZEPAM 1 MG/1
6 TABLET ORAL
Status: DISCONTINUED | OUTPATIENT
Start: 2022-02-26 | End: 2022-02-28 | Stop reason: HOSPADM

## 2022-02-26 RX ADMIN — VANCOMYCIN HYDROCHLORIDE 125 MG: 5 INJECTION, POWDER, LYOPHILIZED, FOR SOLUTION INTRAVENOUS at 05:48

## 2022-02-26 RX ADMIN — LEVOTHYROXINE SODIUM 125 MCG: 0.07 TABLET ORAL at 05:48

## 2022-02-26 RX ADMIN — VANCOMYCIN HYDROCHLORIDE 125 MG: 5 INJECTION, POWDER, LYOPHILIZED, FOR SOLUTION INTRAVENOUS at 23:12

## 2022-02-26 RX ADMIN — AMITRIPTYLINE HYDROCHLORIDE 100 MG: 50 TABLET, FILM COATED ORAL at 21:59

## 2022-02-26 RX ADMIN — CARVEDILOL 6.25 MG: 3.12 TABLET, FILM COATED ORAL at 08:06

## 2022-02-26 RX ADMIN — VANCOMYCIN HYDROCHLORIDE 125 MG: 5 INJECTION, POWDER, LYOPHILIZED, FOR SOLUTION INTRAVENOUS at 11:47

## 2022-02-26 RX ADMIN — SODIUM CHLORIDE, PRESERVATIVE FREE 10 ML: 5 INJECTION INTRAVENOUS at 05:49

## 2022-02-26 RX ADMIN — SODIUM CHLORIDE, PRESERVATIVE FREE 10 ML: 5 INJECTION INTRAVENOUS at 14:31

## 2022-02-26 RX ADMIN — CLONAZEPAM 6 MG: 1 TABLET ORAL at 21:59

## 2022-02-26 RX ADMIN — VANCOMYCIN HYDROCHLORIDE 125 MG: 5 INJECTION, POWDER, LYOPHILIZED, FOR SOLUTION INTRAVENOUS at 00:45

## 2022-02-26 RX ADMIN — SODIUM CHLORIDE, PRESERVATIVE FREE 10 ML: 5 INJECTION INTRAVENOUS at 22:02

## 2022-02-26 RX ADMIN — VANCOMYCIN HYDROCHLORIDE 125 MG: 5 INJECTION, POWDER, LYOPHILIZED, FOR SOLUTION INTRAVENOUS at 17:56

## 2022-02-26 RX ADMIN — CARVEDILOL 6.25 MG: 3.12 TABLET, FILM COATED ORAL at 17:55

## 2022-02-26 RX ADMIN — OLANZAPINE 7.5 MG: 2.5 TABLET, FILM COATED ORAL at 21:59

## 2022-02-26 NOTE — PROGRESS NOTES
Patient c/o continuous large watery output in ostomy bag. Patient worries about the constant need to change wafer and ostomy pouch due to the watery diarrhea. Unable to keep skin dry long enough to place new wafer. Encouraged patient to continue to express concern to physician.

## 2022-02-26 NOTE — PROGRESS NOTES
Hospitalist Progress Note   Admit Date:  2022  9:55 AM   Name:  Mane Marroquin   Age:  64 y.o. Sex:  male  :  1945   MRN:  [id number]   Room:  603/01    Presenting Complaint: Diarrhea    Reason(s) for Admission: C. difficile colitis [P10.26]  Complicated UTI (urinary tract infection) [N39.0]     Hospital Course & Interval History:   Mr. Diane Joyce is a nice 63 y/o WM with a h/o DVT, rectal cancer s/p sigmoidectomy now with colostomy, MDD, urinary retention with chronic Bishop who presented to the ER on  with about a 1 day history of non-bloodoy diarrhea. No recent abx or hospitalizations. Labs showed REBEKAH. Cdiff positive, started on oral vancomycin. UA with pyuria so empirically started on Zosyn, culture growing GNRs but given chronic Bishop and prior UA results likely represents colonization. Abx d/c on . Cr improved to 1.5 on . Subjective/24hr Events (22):  PO intake and urine output have improved. Cr down to 1.4. today. Still reports watery ostomy output largely unchanged. No bleeding, N/V or chest pain. ROS:  10 systems reviewed and negative except as noted above. Assessment & Plan:   # REBEKAH              - Suspect pre-renal due to GI losses. Cr peaked at 2.5 and now improved to 1.4. Will stop IVFs today and encourage PO intake to ensure he can keep up with GI losses.      # Diarrhea 2/2 Cdiff colitis              - PO vancomycin, EOT 3/5. Hopefully with cessation abx his ostomy output will slow down.     # Pyuria              - Has chronic Bishop for chronic urinary retention, UAs consistently show pyuria. Urine culture growing GNRs which likely represents colonization. Abx d/c . Bishop to be changed prior to discharge.     # HyperK              - Resolved.     # Hypothyroidism              - Synthroid     # HTN              - Con't Coreg     # Anxiety d/o              - Klonopin (normal dose 6mg QHS reduced to 4mg with REBEKAH). Verified active Rx through [facility] .      # Chronic urinary retention              - Chronic brito. Dispo/Discharge Planning: Home soon, likely needs . Diet:  ADULT DIET Regular; Low Potassium (Less than 3000 mg/day)  DVT PPx: Ambulation  Code status: Full Code    Hospital Problems as of 2/26/2022 Date Reviewed: 12/10/2021          Codes Class Noted - Resolved POA    Pyuria ICD-10-CM: R82.81  ICD-9-CM: 791.9  2/24/2022 - Present Yes        C. difficile colitis ICD-10-CM: A04.72  ICD-9-CM: 008.45  2/23/2022 - Present Yes        * (Principal) [facility] ICD-10-CM: N17.9  ICD-9-CM: 584.9  9/15/2021 - Present Yes        H/O urinary retention ICD-10-CM: Z87.898  ICD-9-CM: V13.09  8/27/2021 - Present Yes        Anxiety ICD-10-CM: F41.9  ICD-9-CM: 300.00  5/11/2021 - Present Yes        Major depression ICD-10-CM: F32.9  ICD-9-CM: 296.20  Unknown - Present Yes        Hypothyroid ICD-10-CM: E03.9  ICD-9-CM: 244.9  6/14/2019 - Present Yes    Overview Signed 6/14/2019 12:12 AM by Mirna Dominguez MD     stable w/med             Chronic indwelling Brito catheter ICD-10-CM: Z97.8  ICD-9-CM: V45.89  5/19/2019 - Present Yes        RESOLVED: Hyperkalemia ICD-10-CM: E87.5  ICD-9-CM: 276.7  9/15/2021 - 2/24/2022 Yes              Objective:     Patient Vitals for the past 24 hrs:   Temp Pulse Resp BP SpO2   02/26/22 1032 97.6 °F (36.4 °C) 62 17 126/71 99 %   02/26/22 0705 98.5 °F (36.9 °C) 72 17 111/63 98 %   02/26/22 0408 98 °F (36.7 °C) 61 18 94/63 96 %   02/25/22 2316 97.5 °F (36.4 °C) (!) 56 18 109/60 95 %   02/25/22 1948 98 °F (36.7 °C) 62 18 110/66 97 %   02/25/22 1547 97.7 °F (36.5 °C) 66 17 112/74 96 %     Oxygen Therapy  O2 Sat (%): 99 % (02/26/22 1032)  Pulse via Oximetry: 73 beats per minute (02/25/22 0722)  O2 Device: None (Room air) (02/25/22 0722)    Estimated body mass index is 20.16 kg/m² as calculated from the following:    Height as of this encounter: 5' 8\" (1.727 m).     Weight as of this encounter: 60.1 kg (132 lb 9.6 oz).    Intake/Output Summary (Last 24 hours) at 2/26/2022 1206  Last data filed at 2/25/2022 1300  Gross per 24 hour   Intake 240 ml   Output    Net 240 ml         Physical Exam:   Blood pressure 126/71, pulse 62, temperature 97.6 °F (36.4 °C), resp. rate 17, height 5' 8\" (1.727 m), weight 60.1 kg (132 lb 9.6 oz), SpO2 99 %. General:    Thin, underweight. No overt distress  Head:  Normocephalic, atraumatic  Eyes:  Sclerae appear normal.  Pupils equally round. ENT:  Nares appear normal, no drainage. Dry oral mucosa but improved from prior. Neck:  No restricted ROM. Trachea midline   CV:   RRR. No m/r/g. No jugular venous distension. Lungs:   CTAB. No wheezing, rhonchi, or rales. Respirations even, unlabored  Abdomen: Bowel sounds present. Soft, nontender, nondistended. Extremities: No cyanosis or clubbing. No edema  Skin:     No rashes and normal coloration. Warm and dry. Neuro:  CN II-XII grossly intact. Sensation intact. A&Ox3  Psych:  Normal mood and affect.       I have reviewed ordered lab tests and independently visualized imaging below:    Recent Labs:  Recent Results (from the past 48 hour(s))   METABOLIC PANEL, BASIC    Collection Time: 02/25/22  5:30 AM   Result Value Ref Range    Sodium 145 138 - 145 mmol/L    Potassium 4.0 3.5 - 5.1 mmol/L    Chloride 116 (H) 98 - 107 mmol/L    CO2 20 (L) 21 - 32 mmol/L    Anion gap 9 7 - 16 mmol/L    Glucose 98 65 - 100 mg/dL    BUN 50 (H) 8 - 23 MG/DL    Creatinine 2.00 (H) 0.8 - 1.5 MG/DL    GFR est AA 42 (L) >60 ml/min/1.73m2    GFR est non-AA 35 (L) >60 ml/min/1.73m2    Calcium 8.4 8.3 - 10.4 MG/DL   PLEASE READ & DOCUMENT PPD TEST IN 24 HRS    Collection Time: 02/25/22  5:44 AM   Result Value Ref Range    PPD Negative Negative    mm Induration 0 0 - 5 mm   PLEASE READ & DOCUMENT PPD TEST IN 48 HRS    Collection Time: 02/26/22 12:00 AM   Result Value Ref Range    PPD      mm Induration     METABOLIC PANEL, BASIC    Collection Time: 02/26/22 4:55 AM   Result Value Ref Range    Sodium 144 136 - 145 mmol/L    Potassium 3.6 3.5 - 5.1 mmol/L    Chloride 116 (H) 98 - 107 mmol/L    CO2 21 21 - 32 mmol/L    Anion gap 7 7 - 16 mmol/L    Glucose 100 65 - 100 mg/dL    BUN 38 (H) 8 - 23 MG/DL    Creatinine 1.40 0.8 - 1.5 MG/DL    GFR est AA >60 >60 ml/min/1.73m2    GFR est non-AA 52 (L) >60 ml/min/1.73m2    Calcium 7.8 (L) 8.3 - 10.4 MG/DL       All Micro Results     Procedure Component Value Units Date/Time    CULTURE, URINE [[id number]]  (Abnormal)  (Susceptibility) Collected: 02/23/22 1011    Order Status: Completed Specimen: Urine from Clean catch Updated: 02/26/22 0707     Special Requests: NO SPECIAL REQUESTS        Culture result:       >100,000 COLONIES/mL ESCHERICHIA COLI                  CULTURE IN PROGRESS,FURTHER UPDATES TO FOLLOW                  <1,000 CFU/ML NORMAL SKIN DEMARCO ISOLATED          OVA & PARASITES, STOOL [[id number]] Collected: 02/23/22 1011    Order Status: Completed Specimen: Feces from Stool Updated: 02/25/22 1536     Source STOOL        Ova & Parasite exam Final Report Below        Comment: (NOTE)  These results were obtained using wet preparation(s) and trichrome  stained smear. This test does not include testing for  Cryptosporidium parvum, Cyclospora, or Microsporidia. Performed At: [address] [id number]  Burnetta Phalen MD BS:[id number]         Darryle Lowenstein [[id number]] Collected: 02/23/22 1011    Order Status: Completed Specimen: Stool Updated: 02/25/22 0734     Special Requests: NO SPECIAL REQUESTS        Culture result:       No Salmonella, Shigella, or Ecoli 0157 isolated. C. DIFFICILE/EPI PCR [[id number]]  (Abnormal) Collected: 02/23/22 1011    Order Status: Completed Specimen: Stool Updated: 02/23/22 1304     Special Requests: NO SPECIAL REQUESTS        Culture result:       Toxigenic C. difficile POSITIVE.                   RESULTS VERIFIED, PHONED TO AND READ BACK BY   ANDRE Rosibel Nowak 12:58 02.23.2022 AMM            Other Studies:  No results found. Current Meds:  Current Facility-Administered Medications   Medication Dose Route Frequency    sodium chloride (NS) flush 5-10 mL  5-10 mL IntraVENous Q8H    sodium chloride (NS) flush 5-10 mL  5-10 mL IntraVENous PRN    sodium chloride (NS) flush 5-40 mL  5-40 mL IntraVENous Q8H    sodium chloride (NS) flush 5-40 mL  5-40 mL IntraVENous PRN    acetaminophen (TYLENOL) tablet 650 mg  650 mg Oral Q6H PRN    Or    acetaminophen (TYLENOL) suppository 650 mg  650 mg Rectal Q6H PRN    polyethylene glycol (MIRALAX) packet 17 g  17 g Oral DAILY PRN    ondansetron (ZOFRAN ODT) tablet 4 mg  4 mg Oral Q8H PRN    Or    ondansetron (ZOFRAN) injection 4 mg  4 mg IntraVENous Q6H PRN    vancomycin 50 mg/mL oral solution (compounded) 125 mg  125 mg Oral Q6H    amitriptyline (ELAVIL) tablet 100 mg  100 mg Oral QHS    carvediloL (COREG) tablet 6.25 mg  6.25 mg Oral BID WITH MEALS    clonazePAM (KlonoPIN) tablet 4 mg  4 mg Oral QHS    levothyroxine (SYNTHROID) tablet 125 mcg  125 mcg Oral 6am    OLANZapine (ZyPREXA) tablet 7.5 mg  7.5 mg Oral QHS       Signed:  Donovan Cárdenas MD    Part of this note may have been written by using a voice dictation software. The note has been proof read but may still contain some grammatical/other typographical errors.

## 2022-02-26 NOTE — PROGRESS NOTES
Patient slept well throughout shift. No distress noted. He is independent with brito and ostomy care.

## 2022-02-27 LAB
ANION GAP SERPL CALC-SCNC: 6 MMOL/L (ref 7–16)
BACTERIA SPEC CULT: ABNORMAL
BUN SERPL-MCNC: 28 MG/DL (ref 8–23)
CALCIUM SERPL-MCNC: 8.1 MG/DL (ref 8.3–10.4)
CHLORIDE SERPL-SCNC: 114 MMOL/L (ref 98–107)
CO2 SERPL-SCNC: 21 MMOL/L (ref 21–32)
CREAT SERPL-MCNC: 1.2 MG/DL (ref 0.8–1.5)
GLUCOSE SERPL-MCNC: 97 MG/DL (ref 65–100)
POTASSIUM SERPL-SCNC: 3.7 MMOL/L (ref 3.5–5.1)
SERVICE CMNT-IMP: ABNORMAL
SODIUM SERPL-SCNC: 141 MMOL/L (ref 136–145)

## 2022-02-27 PROCEDURE — 36415 COLL VENOUS BLD VENIPUNCTURE: CPT

## 2022-02-27 PROCEDURE — 74011250637 HC RX REV CODE- 250/637: Performed by: INTERNAL MEDICINE

## 2022-02-27 PROCEDURE — 74011250636 HC RX REV CODE- 250/636: Performed by: FAMILY MEDICINE

## 2022-02-27 PROCEDURE — 74011250637 HC RX REV CODE- 250/637: Performed by: FAMILY MEDICINE

## 2022-02-27 PROCEDURE — 74011000250 HC RX REV CODE- 250: Performed by: FAMILY MEDICINE

## 2022-02-27 PROCEDURE — 65270000029 HC RM PRIVATE

## 2022-02-27 PROCEDURE — 74011000250 HC RX REV CODE- 250: Performed by: EMERGENCY MEDICINE

## 2022-02-27 PROCEDURE — 80048 BASIC METABOLIC PNL TOTAL CA: CPT

## 2022-02-27 RX ADMIN — LEVOTHYROXINE SODIUM 125 MCG: 0.07 TABLET ORAL at 05:31

## 2022-02-27 RX ADMIN — SODIUM CHLORIDE, PRESERVATIVE FREE 10 ML: 5 INJECTION INTRAVENOUS at 18:19

## 2022-02-27 RX ADMIN — AMITRIPTYLINE HYDROCHLORIDE 100 MG: 50 TABLET, FILM COATED ORAL at 22:10

## 2022-02-27 RX ADMIN — VANCOMYCIN HYDROCHLORIDE 125 MG: 5 INJECTION, POWDER, LYOPHILIZED, FOR SOLUTION INTRAVENOUS at 17:29

## 2022-02-27 RX ADMIN — VANCOMYCIN HYDROCHLORIDE 125 MG: 5 INJECTION, POWDER, LYOPHILIZED, FOR SOLUTION INTRAVENOUS at 11:37

## 2022-02-27 RX ADMIN — CARVEDILOL 6.25 MG: 3.12 TABLET, FILM COATED ORAL at 17:29

## 2022-02-27 RX ADMIN — SODIUM CHLORIDE, PRESERVATIVE FREE 10 ML: 5 INJECTION INTRAVENOUS at 05:04

## 2022-02-27 RX ADMIN — OLANZAPINE 7.5 MG: 2.5 TABLET, FILM COATED ORAL at 22:11

## 2022-02-27 RX ADMIN — SODIUM CHLORIDE, PRESERVATIVE FREE 10 ML: 5 INJECTION INTRAVENOUS at 22:10

## 2022-02-27 RX ADMIN — CLONAZEPAM 6 MG: 1 TABLET ORAL at 22:10

## 2022-02-27 RX ADMIN — CARVEDILOL 6.25 MG: 3.12 TABLET, FILM COATED ORAL at 09:01

## 2022-02-27 RX ADMIN — VANCOMYCIN HYDROCHLORIDE 125 MG: 5 INJECTION, POWDER, LYOPHILIZED, FOR SOLUTION INTRAVENOUS at 05:31

## 2022-02-27 NOTE — PROGRESS NOTES
Hospitalist Progress Note   Admit Date:  2022  9:55 AM   Name:  Yarelis Marroquin   Age:  64 y.o. Sex:  male  :  1945   MRN:  [id number]   Room:  603/01    Presenting Complaint: Diarrhea    Reason(s) for Admission: C. difficile colitis [A67.96]  Complicated UTI (urinary tract infection) [N39.0]     Hospital Course & Interval History:   Mr. Petr Montes is a nice 65 y/o WM with a h/o DVT, rectal cancer s/p sigmoidectomy now with colostomy, MDD, urinary retention with chronic Bishop who presented to the ER on  with about a 1 day history of non-bloodoy diarrhea. No recent abx or hospitalizations. Labs showed REBEKAH. Cdiff positive, started on oral vancomycin. UA with pyuria so empirically started on Zosyn, culture growing GNRs but given chronic Bishop and prior UA results likely represents colonization. Abx d/c on . Cr improved to 1.2 on  and ostomy output decreased significantly and appears more like stool than water. Subjective/24hr Events (22): Able to eat breakfast today w/o significant or watery ostomy output. Has very small amount of output in his bag but it looks more like stool today, before was very watery. He's ambulating around the hallways w/o any difficulty. He denies chest pain or SOB otherwise. ROS:  10 systems reviewed and negative except as noted above. Assessment & Plan:   # REBEKAH              - Suspect pre-renal due to GI losses. Cr peaked at 2.5 and now normal. IVFs d/c , tolerating PO and ostomy output has slowed.     # Diarrhea 2/2 Cdiff colitis              - PO vancomycin, EOT 3/5. abx also stopped and today he was able to eat breakfast w/o watery output. Overall ostomy output improved.     # Pyuria              - Has chronic Bishop for chronic urinary retention, UAs consistently show pyuria. Urine culture growing GNRs which likely represents colonization. Abx d/c .  Bishop to be changed prior to discharge.     # HyperK              - [id number].     # Hypothyroidism              - Synthroid     # HTN              - Con't Coreg     # Anxiety d/o              - Klonopin resumed to home dose 6mg QHS (verified in [facility] )     # Chronic urinary retention              - Chronic brito, change prior to dc. Dispo/Discharge Planning: Home tomorrow if ostomy output remains improved.   Diet:  ADULT DIET Regular; Low Potassium (Less than 3000 mg/day)  DVT PPx: Ambulation  Code status: Full Code    Hospital Problems as of 2/27/2022 Date Reviewed: 12/10/2021          Codes Class Noted - Resolved POA    Pyuria ICD-10-CM: R82.81  ICD-9-CM: 791.9  2/24/2022 - Present Yes        C. difficile colitis ICD-10-CM: A04.72  ICD-9-CM: 008.45  2/23/2022 - Present Yes        * (Principal) REBEKAH (acute kidney injury) ([facility] ICD-10-CM: N17.9  ICD-9-CM: 584.9  9/15/2021 - Present Yes        H/O urinary retention ICD-10-CM: Z87.898  ICD-9-CM: V13.09  8/27/2021 - Present Yes        Anxiety ICD-10-CM: F41.9  ICD-9-CM: 300.00  5/11/2021 - Present Yes        Major depression ICD-10-CM: F32.9  ICD-9-CM: 296.20  Unknown - Present Yes        Hypothyroid ICD-10-CM: E03.9  ICD-9-CM: 244.9  6/14/2019 - Present Yes    Overview Signed 6/14/2019 12:12 AM by Nikos Mcclain MD     stable w/med             Chronic indwelling Brito catheter ICD-10-CM: Z97.8  ICD-9-CM: V45.89  5/19/2019 - Present Yes        RESOLVED: Hyperkalemia ICD-10-CM: E87.5  ICD-9-CM: 276.7  9/15/2021 - 2/24/2022 Yes              Objective:     Patient Vitals for the past 24 hrs:   Temp Pulse Resp BP SpO2   02/27/22 0901 97.6 °F (36.4 °C) (!) 101 16 131/83 97 %   02/27/22 0400 97.9 °F (36.6 °C) 62 18 100/63 95 %   02/26/22 2318 97.5 °F (36.4 °C) 71 16 112/67 100 %   02/26/22 1925 97.4 °F (36.3 °C) (!) 58 18 127/67 98 %   02/26/22 1551 98.4 °F (36.9 °C) 71 17 119/74 99 %     Oxygen Therapy  O2 Sat (%): 97 % (02/27/22 0901)  Pulse via Oximetry: 73 beats per minute (02/25/22 0722)  O2 Device: None (Room air) (02/27/22 0901)    Estimated body mass index is 19.86 kg/m² as calculated from the following:    Height as of this encounter: 5' 8\" (1.727 m). Weight as of this encounter: 59.2 kg (130 lb 9.6 oz). No intake or output data in the 24 hours ending 02/27/22 1049      Physical Exam:   Blood pressure 131/83, pulse (!) 101, temperature 97.6 °F (36.4 °C), resp. rate 16, height 5' 8\" (1.727 m), weight 59.2 kg (130 lb 9.6 oz), SpO2 97 %. General:    Thin, underweight, temporal wasting b/l. NAD otherwise. Pleasant. Head:  Normocephalic, atraumatic  Eyes:  Sclerae appear normal.  Pupils equally round. ENT:  Nares appear normal, no drainage. Moist oral mucosa  Neck:  No restricted ROM. Trachea midline   CV:   RRR. No m/r/g. No jugular venous distension. Lungs:   CTAB. No wheezing, rhonchi, or rales. Respirations even, unlabored. Abdomen: Bowel sounds present. Soft, nontender, nondistended. Ostomy bag with very small amt of stool. Extremities: No cyanosis or clubbing. No edema  Skin:     No rashes and normal coloration. Warm and dry. Neuro:  CN II-XII grossly intact. Sensation intact. A&Ox3  Psych:  Normal mood and affect.       I have reviewed ordered lab tests and independently visualized imaging below:    Recent Labs:  Recent Results (from the past 48 hour(s))   PLEASE READ & DOCUMENT PPD TEST IN 48 HRS    Collection Time: 02/26/22 12:00 AM   Result Value Ref Range    PPD      mm Induration     METABOLIC PANEL, BASIC    Collection Time: 02/26/22  4:55 AM   Result Value Ref Range    Sodium 144 136 - 145 mmol/L    Potassium 3.6 3.5 - 5.1 mmol/L    Chloride 116 (H) 98 - 107 mmol/L    CO2 21 21 - 32 mmol/L    Anion gap 7 7 - 16 mmol/L    Glucose 100 65 - 100 mg/dL    BUN 38 (H) 8 - 23 MG/DL    Creatinine 1.40 0.8 - 1.5 MG/DL    GFR est AA >60 >60 ml/min/1.73m2    GFR est non-AA 52 (L) >60 ml/min/1.73m2    Calcium 7.8 (L) 8.3 - 10.4 MG/DL   PLEASE READ & DOCUMENT PPD TEST IN 72 HRS    Collection Time: 02/26/22  7:00 AM   Result Value Ref Range    PPD Negative Negative    mm Induration 0 0 - 5 mm   METABOLIC PANEL, BASIC    Collection Time: 02/27/22  6:46 AM   Result Value Ref Range    Sodium 141 136 - 145 mmol/L    Potassium 3.7 3.5 - 5.1 mmol/L    Chloride 114 (H) 98 - 107 mmol/L    CO2 21 21 - 32 mmol/L    Anion gap 6 (L) 7 - 16 mmol/L    Glucose 97 65 - 100 mg/dL    BUN 28 (H) 8 - 23 MG/DL    Creatinine 1.20 0.8 - 1.5 MG/DL    GFR est AA >60 >60 ml/min/1.73m2    GFR est non-AA >60 >60 ml/min/1.73m2    Calcium 8.1 (L) 8.3 - 10.4 MG/DL       All Micro Results     Procedure Component Value Units Date/Time    CULTURE, URINE [[id number]]  (Abnormal)  (Susceptibility) Collected: 02/23/22 1011    Order Status: Completed Specimen: Urine from Clean catch Updated: 02/27/22 0811     Special Requests: NO SPECIAL REQUESTS        Culture result:       >100,000 COLONIES/mL ESCHERICHIA COLI                  >100,000 COLONIES/mL KLEBSIELLA PNEUMONIAE                  <1,000 CFU/ML NORMAL SKIN DEMARCO ISOLATED          OVA & PARASITES, STOOL [[id number]] Collected: 02/23/22 1011    Order Status: Completed Specimen: Feces from Stool Updated: 02/25/22 1536     Source STOOL        Ova & Parasite exam Final Report Below        Comment: (NOTE)  These results were obtained using wet preparation(s) and trichrome  stained smear. This test does not include testing for  Cryptosporidium parvum, Cyclospora, or Microsporidia. Performed At: [address] [id number]  Zari Ryder MD LS:[id number]         [facility] Nnamdi [[id number]] Collected: 02/23/22 1011    Order Status: Completed Specimen: Stool Updated: 02/25/22 0734     Special Requests: NO SPECIAL REQUESTS        Culture result:       No Salmonella, Shigella, or Ecoli 0157 isolated.           C. DIFFICILE/EPI PCR [[id number]]  (Abnormal) Collected: 02/23/22 1011    Order Status: Completed Specimen: Stool Updated: 02/23/22 1304     Special Requests: NO SPECIAL REQUESTS        Culture result:       Toxigenic C. difficile POSITIVE. RESULTS VERIFIED, PHONED TO AND READ BACK BY  DR. Andre Orr 12:58 [phone] AMM            Other Studies:  No results found. Current Meds:  Current Facility-Administered Medications   Medication Dose Route Frequency    clonazePAM (KlonoPIN) tablet 6 mg  6 mg Oral QHS    sodium chloride (NS) flush 5-10 mL  5-10 mL IntraVENous Q8H    sodium chloride (NS) flush 5-10 mL  5-10 mL IntraVENous PRN    sodium chloride (NS) flush 5-40 mL  5-40 mL IntraVENous Q8H    sodium chloride (NS) flush 5-40 mL  5-40 mL IntraVENous PRN    acetaminophen (TYLENOL) tablet 650 mg  650 mg Oral Q6H PRN    Or    acetaminophen (TYLENOL) suppository 650 mg  650 mg Rectal Q6H PRN    polyethylene glycol (MIRALAX) packet 17 g  17 g Oral DAILY PRN    ondansetron (ZOFRAN ODT) tablet 4 mg  4 mg Oral Q8H PRN    Or    ondansetron (ZOFRAN) injection 4 mg  4 mg IntraVENous Q6H PRN    vancomycin 50 mg/mL oral solution (compounded) 125 mg  125 mg Oral Q6H    amitriptyline (ELAVIL) tablet 100 mg  100 mg Oral QHS    carvediloL (COREG) tablet 6.25 mg  6.25 mg Oral BID WITH MEALS    levothyroxine (SYNTHROID) tablet 125 mcg  125 mcg Oral 6am    OLANZapine (ZyPREXA) tablet 7.5 mg  7.5 mg Oral QHS       Signed:  Jana Holden MD    Part of this note may have been written by using a voice dictation software. The note has been proof read but may still contain some grammatical/other typographical errors.

## 2022-02-27 NOTE — PROGRESS NOTES
Case management notified that patient needs assistance obtaining vancomycin compound upon discharge.

## 2022-02-28 VITALS
OXYGEN SATURATION: 99 % | HEART RATE: 88 BPM | HEIGHT: 68 IN | TEMPERATURE: 97.4 F | RESPIRATION RATE: 18 BRPM | BODY MASS INDEX: 20.1 KG/M2 | SYSTOLIC BLOOD PRESSURE: 134 MMHG | WEIGHT: 132.6 LBS | DIASTOLIC BLOOD PRESSURE: 93 MMHG

## 2022-02-28 PROBLEM — R82.81 PYURIA: Status: RESOLVED | Noted: 2022-02-24 | Resolved: 2022-02-28

## 2022-02-28 LAB
ANION GAP SERPL CALC-SCNC: 4 MMOL/L (ref 7–16)
BUN SERPL-MCNC: 31 MG/DL (ref 8–23)
CALCIUM SERPL-MCNC: 8.3 MG/DL (ref 8.3–10.4)
CHLORIDE SERPL-SCNC: 111 MMOL/L (ref 98–107)
CO2 SERPL-SCNC: 24 MMOL/L (ref 21–32)
CREAT SERPL-MCNC: 1.3 MG/DL (ref 0.8–1.5)
GLUCOSE SERPL-MCNC: 90 MG/DL (ref 65–100)
POTASSIUM SERPL-SCNC: 3.5 MMOL/L (ref 3.5–5.1)
SODIUM SERPL-SCNC: 139 MMOL/L (ref 138–145)

## 2022-02-28 PROCEDURE — 36415 COLL VENOUS BLD VENIPUNCTURE: CPT

## 2022-02-28 PROCEDURE — 74011000250 HC RX REV CODE- 250: Performed by: EMERGENCY MEDICINE

## 2022-02-28 PROCEDURE — 74011250637 HC RX REV CODE- 250/637: Performed by: FAMILY MEDICINE

## 2022-02-28 PROCEDURE — 74011250636 HC RX REV CODE- 250/636: Performed by: FAMILY MEDICINE

## 2022-02-28 PROCEDURE — 80048 BASIC METABOLIC PNL TOTAL CA: CPT

## 2022-02-28 PROCEDURE — 74011000250 HC RX REV CODE- 250: Performed by: FAMILY MEDICINE

## 2022-02-28 RX ORDER — VANCOMYCIN HYDROCHLORIDE 125 MG/1
125 CAPSULE ORAL 4 TIMES DAILY
Qty: 21 CAPSULE | Refills: 0 | Status: SHIPPED | OUTPATIENT
Start: 2022-02-28 | End: 2022-03-06

## 2022-02-28 RX ADMIN — VANCOMYCIN HYDROCHLORIDE 125 MG: 5 INJECTION, POWDER, LYOPHILIZED, FOR SOLUTION INTRAVENOUS at 05:44

## 2022-02-28 RX ADMIN — VANCOMYCIN HYDROCHLORIDE 125 MG: 5 INJECTION, POWDER, LYOPHILIZED, FOR SOLUTION INTRAVENOUS at 00:24

## 2022-02-28 RX ADMIN — SODIUM CHLORIDE, PRESERVATIVE FREE 10 ML: 5 INJECTION INTRAVENOUS at 05:46

## 2022-02-28 RX ADMIN — CARVEDILOL 6.25 MG: 3.12 TABLET, FILM COATED ORAL at 09:00

## 2022-02-28 RX ADMIN — LEVOTHYROXINE SODIUM 125 MCG: 0.07 TABLET ORAL at 05:44

## 2022-02-28 NOTE — PROGRESS NOTES
Resting comfortably in bed. A/O x4. Independent with brito and ostomy care. No c/o pain and any large diarrhea from ostomy bag during shift. Hourly rounds completed, all needs met at this time. Bed in low position, locked, and call light within reach. Will continue to monitor care and give bedside shift report to oncoming nurse.

## 2022-02-28 NOTE — DISCHARGE SUMMARY
Hospitalist Discharge Summary   Admit Date:  2022  9:55 AM   DC Note date: 2022  Name:  Juan Antonio Reyes   Age:  64 y.o. Sex:  male  :  1945   MRN:  [id number]   Room:  [facility]  PCP:  Joaquin Almendarez DO    Presenting Complaint: Diarrhea    Initial Admission Diagnosis: C. difficile colitis [X80.11]  Complicated UTI (urinary tract infection) [N39.0]     Problem List for this Hospitalization:  Hospital Problems as of 2022 Date Reviewed: 12/10/2021          Codes Class Noted - Resolved POA    C. difficile colitis ICD-10-CM: A04.72  ICD-9-CM: 008.45  2022 - Present Yes        * (Principal) REBEKAH (acute kidney injury) ([facility] [address] ICD-10-CM: N17.9  ICD-9-CM: 584.9  9/15/2021 - Present Yes        H/O urinary retention ICD-10-CM: Z87.898  ICD-9-CM: V13.09  2021 - Present Yes        Anxiety ICD-10-CM: F41.9  ICD-9-CM: 300.00  2021 - Present Yes        Major depression ICD-10-CM: F32.9  ICD-9-CM: 296.20  Unknown - Present Yes        Hypothyroid ICD-10-CM: E03.9  ICD-9-CM: 244.9  2019 - Present Yes    Overview Signed 2019 12:12 AM by Zulema Bryant MD     stable w/med             Chronic indwelling Bishop catheter ICD-10-CM: Z97.8  ICD-9-CM: V45.89  2019 - Present Yes        RESOLVED: Pyuria ICD-10-CM: R82.81  ICD-9-CM: 791.9  2022 - 2022 Yes        RESOLVED: Hyperkalemia ICD-10-CM: E87.5  ICD-9-CM: 276.7  9/15/2021 - 2022 Yes            Did Patient have Sepsis (YES OR NO): No    Hospital Course:  Mr. Beto Rodríguez is a nice 65 y/o WM with a h/o DVT, rectal cancer s/p sigmoidectomy now with colostomy, MDD, urinary retention with chronic Bishop who presented to the ER on  with a 1 day history of non-bloody diarrhea. No recent antibiotics or hospitalizations. Labs showed REBEKAH with sCr 2 (baseline around 1). Cdiff positive and he was started on oral vancomycin. UA showed pyuria so he was empirically started on Zosyn, culture grew E.  Coli and klebsiella pna, but given chronic Bishop and prior UA results this likely represents colonization. His antibiotics were d/c on 2/25 and his ostomy output soon slowed down and became less frequent and less watery. Cr has improved to 1.3. He is tolerating PO and ambulating w/o issues. His ostomy output has improved significantly. He will complete oral vancomycin at discharge. He has a follow up with Urology in a few days to have his Bishop exchanged. His hospital course was otherwise unremarkable and he is medically stable for discharge home. Disposition: Home or Self Care  Diet: ADULT DIET Regular; Low Potassium (Less than 3000 mg/day)  Code Status: Full Code    Follow Up Orders:  No orders of the defined types were placed in this encounter. Follow-up Information     Follow up With Specialties Details Why Contact Info    Anneliese Pritchett, DO Internal Medicine In 1 week Hospital follow up. Amilcar, REBEKAH [address] [facility] [address]  [phone]            Follow up labs/diagnostics (ultimately defer to outpatient provider):  N/A    Time spent in patient discharge and coordination 35 minutes. Plan was discussed with patient, RN, CM. All questions answered. Patient was stable at time of discharge. Instructions given to call a physician or return if any concerns. Discharge Info:   Current Discharge Medication List      START taking these medications    Details   vancomycin (VANCOCIN) 125 mg capsule Take 1 Capsule by mouth four (4) times daily for 21 doses. Qty: 21 Capsule, Refills: 0  Start date: 2/28/2022, End date: 3/6/2022         CONTINUE these medications which have NOT CHANGED    Details   levothyroxine (SYNTHROID) 125 mcg tablet TAKE ONE TABLET BY MOUTH ONE TIME DAILY BEFORE BREAKFAST  Qty: 90 Tablet, Refills: 3    Associated Diagnoses: Acquired hypothyroidism      carvediloL (COREG) 3.125 mg tablet Take 2 Tablets by mouth two (2) times daily (with meals).   Qty: 180 Tablet, Refills: 3    Associated Diagnoses: HTN (hypertension), benign; Tachycardia      clonazePAM (KlonoPIN) 2 mg tablet Take 2 Tablets by mouth nightly. Max Daily Amount: 4 mg. Indications: takes for sleep  Qty: 15 Tablet, Refills: 0    Associated Diagnoses: Primary insomnia      ascorbic acid, vitamin C, (VITAMIN C) 500 mg tablet Take 500 mg by mouth two (2) times a day. cyanocobalamin (VITAMIN B12) 500 mcg tablet Take 500 mcg by mouth two (2) times a day. acetaminophen (TYLENOL) 500 mg tablet Take 500 mg by mouth every six (6) hours as needed for Pain. amitriptyline (ELAVIL) 100 mg tablet Take 1 Tab by mouth nightly. Dr Kiel Meneses psych  Indications: takes for sleep  Qty: 15 Tab, Refills: 0      OLANZapine (ZYPREXA) 2.5 mg tablet Take 7.5 mg by mouth nightly. Indications: additional medications to treat depression, Takes 3 x 2.5 mg tabs      ibuprofen (MOTRIN) 600 mg tablet Take 600 mg by mouth every six (6) hours as needed for Pain. Cholecalciferol, Vitamin D3, (VITAMIN D3) 2,000 unit cap capsule Take 2,000 Units by mouth two (2) times a day. Qty: 180 Cap, Refills: 3             Procedures done this admission:  * No surgery found *    Consults this admission:  None    Echocardiogram/EKG results:  No results found for this or any previous visit. EKG Results     Procedure 720 Value Units Date/Time    EKG 12 LEAD INITIAL [[id number]]     Order Status: Completed           Diagnostic Imaging/Tests:   US RETROPERITONEUM LTD    Result Date: 2/23/2022  EXAM: Ultrasound of the kidneys. INDICATION: Acute renal insufficiency. COMPARISON: Prior CT abdomen on November 8, 2021. TECHNIQUE: A standard protocol kidney ultrasound was performed. FINDINGS: The left kidney measures 10.9 cm in length and has a normal appearance. The right kidney measures 10.1 cm in length and contains 3 small stones in its mid and lower pole, the largest measuring 4 mm. There is no right kidney hydronephrosis. The urinary bladder was obscured by the patient's colostomy. 1. No evidence of hydronephrosis. 2. Right kidney stones. 3. Normal left kidney. All Micro Results     Procedure Component Value Units Date/Time    CULTURE, URINE [[id number]]  (Abnormal)  (Susceptibility) Collected: 02/23/22 1011    Order Status: Completed Specimen: Urine from Clean catch Updated: 02/27/22 0811     Special Requests: NO SPECIAL REQUESTS        Culture result:       >100,000 COLONIES/mL ESCHERICHIA COLI                  >100,000 COLONIES/mL KLEBSIELLA PNEUMONIAE                  <1,000 CFU/ML NORMAL SKIN DEMARCO ISOLATED          OVA & PARASITES, STOOL [[id number]] Collected: 02/23/22 1011    Order Status: Completed Specimen: Feces from Stool Updated: 02/25/22 1536     Source STOOL        Ova & Parasite exam Final Report Below        Comment: (NOTE)  These results were obtained using wet preparation(s) and trichrome  stained smear. This test does not include testing for  Cryptosporidium parvum, Cyclospora, or Microsporidia. Performed At: [facility] [address], [place] [id number]  Steffany Roland MD DQ:[phone]         Bhavani Leger [[id number]] Collected: 02/23/22 1011    Order Status: Completed Specimen: Stool Updated: 02/25/22 0734     Special Requests: NO SPECIAL REQUESTS        Culture result:       No Salmonella, Shigella, or Ecoli 0157 isolated. C. DIFFICILE/EPI PCR [[id number]]  (Abnormal) Collected: 02/23/22 1011    Order Status: Completed Specimen: Stool Updated: 02/23/22 1304     Special Requests: NO SPECIAL REQUESTS        Culture result:       Toxigenic C. difficile POSITIVE.                   RESULTS VERIFIED, PHONED TO AND READ BACK BY  DR. Lopez Moss 12:58 [phone] AM            Labs: Results:       BMP, Mg, Phos Recent Labs     02/28/22  0451 02/27/22  0646 02/26/22  0455    141 144   K 3.5 3.7 3.6   * 114* 116*   CO2 24 21 21   AGAP 4* 6* 7   BUN 31* 28* 38*   CREA 1.30 1.20 1.40   CA 8.3 8.1* 7.8*   GLU 90 97 100      CBC No results for input(s): WBC, RBC, HGB, HCT, PLT, GRANS, LYMPH, EOS, MONOS, BASOS, IG, ANEU, ABL, ELIAN, ABM, ABB, AIG, HGBEXT, HCTEXT, PLTEXT in the last 72 hours. LFT No results for input(s): ALT, TBIL, AP, TP, ALB, GLOB, AGRAT in the last 72 hours.     No lab exists for component: SGOT, GPT   Cardiac Testing Lab Results   Component Value Date/Time     05/22/2016 11:15 AM    CK - MB 2.0 05/22/2016 11:15 AM    CK-MB Index 2.0 05/22/2016 11:15 AM    Troponin-I, Qt. <0.04 05/22/2016 11:15 AM      Coagulation Tests Lab Results   Component Value Date/Time    Prothrombin time 13.7 09/15/2021 07:45 PM    Prothrombin time 16.3 (H) 08/27/2021 04:30 PM    Prothrombin time 15.5 (H) 08/27/2021 01:30 PM    INR 1.0 09/15/2021 07:45 PM    INR 1.3 08/27/2021 04:30 PM    INR 1.2 08/27/2021 01:30 PM    aPTT 28.1 08/27/2021 04:30 PM    aPTT 32.5 08/27/2021 01:30 PM    aPTT 62.9 (H) 08/27/2021 11:56 AM      A1c Lab Results   Component Value Date/Time    Hemoglobin A1c 5.2 08/03/2021 11:14 AM      Lipid Panel Lab Results   Component Value Date/Time    Cholesterol, total 205 (H) 12/03/2021 08:09 AM    HDL Cholesterol 77 12/03/2021 08:09 AM    LDL, calculated 110 (H) 12/03/2021 08:09 AM    LDL, calculated 76 12/05/2019 08:30 AM    VLDL, calculated 18 12/03/2021 08:09 AM    VLDL, calculated 18 12/05/2019 08:30 AM    Triglyceride 103 12/03/2021 08:09 AM    CHOL/HDL Ratio 4.2 05/20/2019 05:03 AM      Thyroid Panel Lab Results   Component Value Date/Time    TSH 4.440 12/03/2021 08:09 AM    TSH 3.600 12/03/2020 08:31 AM    TSH 1.420 05/20/2019 05:03 AM    TSH 3.160 04/13/2019 05:58 AM    T4, Total 9.1 02/28/2017 02:31 PM    T4, Total 9.6 09/02/2016 09:58 AM        Most Recent UA Lab Results   Component Value Date/Time    Color RED 02/23/2022 10:11 AM    Appearance TURBID 02/23/2022 10:11 AM    Specific gravity 1.017 06/13/2019 06:28 PM    pH (UA) 7.0 02/23/2022 10:11 AM    Protein GREATER THAN/EQUAL  (A) 02/23/2022 10:11 AM Glucose Negative 02/23/2022 10:11 AM    Ketone 15 (A) 02/23/2022 10:11 AM    Bilirubin MODERATE (A) 02/23/2022 10:11 AM    Blood LARGE (A) 02/23/2022 10:11 AM    Urobilinogen 1.0 02/23/2022 10:11 AM    Nitrites Positive (A) 02/23/2022 10:11 AM    Leukocyte Esterase LARGE (A) 02/23/2022 10:11 AM    WBC >100 02/23/2022 10:11 AM    RBC >100 02/23/2022 10:11 AM    Epithelial cells 3-5 02/23/2022 10:11 AM    Bacteria 4+ (H) 02/23/2022 10:11 AM    Casts 0 06/07/2021 09:33 AM    Crystals, urine MODERATE 02/23/2022 10:11 AM    Mucus 0 06/07/2021 09:33 AM    Other observations RESULTS VERIFIED MANUALLY 02/23/2022 10:11 AM          All Labs from Last 24 Hrs:  Recent Results (from the past 24 hour(s))   METABOLIC PANEL, BASIC    Collection Time: 02/28/22  4:51 AM   Result Value Ref Range    Sodium 139 138 - 145 mmol/L    Potassium 3.5 3.5 - 5.1 mmol/L    Chloride 111 (H) 98 - 107 mmol/L    CO2 24 21 - 32 mmol/L    Anion gap 4 (L) 7 - 16 mmol/L    Glucose 90 65 - 100 mg/dL    BUN 31 (H) 8 - 23 MG/DL    Creatinine 1.30 0.8 - 1.5 MG/DL    GFR est AA >60 >60 ml/min/1.73m2    GFR est non-AA 57 (L) >60 ml/min/1.73m2    Calcium 8.3 8.3 - 10.4 MG/DL       Current Med List in Hospital:   Current Facility-Administered Medications   Medication Dose Route Frequency    clonazePAM (KlonoPIN) tablet 6 mg  6 mg Oral QHS    sodium chloride (NS) flush 5-10 mL  5-10 mL IntraVENous Q8H    sodium chloride (NS) flush 5-10 mL  5-10 mL IntraVENous PRN    sodium chloride (NS) flush 5-40 mL  5-40 mL IntraVENous Q8H    sodium chloride (NS) flush 5-40 mL  5-40 mL IntraVENous PRN    acetaminophen (TYLENOL) tablet 650 mg  650 mg Oral Q6H PRN    Or    acetaminophen (TYLENOL) suppository 650 mg  650 mg Rectal Q6H PRN    polyethylene glycol (MIRALAX) packet 17 g  17 g Oral DAILY PRN    ondansetron (ZOFRAN ODT) tablet 4 mg  4 mg Oral Q8H PRN    Or    ondansetron (ZOFRAN) injection 4 mg  4 mg IntraVENous Q6H PRN    vancomycin 50 mg/mL oral solution (compounded) 125 mg  125 mg Oral Q6H    amitriptyline (ELAVIL) tablet 100 mg  100 mg Oral QHS    carvediloL (COREG) tablet 6.25 mg  6.25 mg Oral BID WITH MEALS    levothyroxine (SYNTHROID) tablet 125 mcg  125 mcg Oral 6am    OLANZapine (ZyPREXA) tablet 7.5 mg  7.5 mg Oral QHS       No Known Allergies  Immunization History   Administered Date(s) Administered    COVID-19, Pfizer Purple top, DILUTE for use, 12+ yrs, 30mcg/0.3mL dose 02/15/2021, 03/08/2021, 10/25/2021    TB Skin Test (PPD) Intradermal 01/28/2014, 08/24/2014, 09/09/2014, 11/19/2015, 05/23/2016, 03/25/2019, 04/23/2019, 09/24/2021, 09/27/2021, 02/24/2022       Recent Vital Data:  Patient Vitals for the past 24 hrs:   Temp Pulse Resp BP SpO2   02/28/22 0815 97.4 °F (36.3 °C) 88 18 (!) 134/93 99 %   02/28/22 0417  67 18 114/74 96 %   02/27/22 2309 97.8 °F (36.6 °C) 60 18 (!) 100/55 95 %   02/27/22 1929 97.9 °F (36.6 °C) 63 18 93/60 97 %   02/27/22 1703 98.4 °F (36.9 °C) 76 16 105/67 98 %     Oxygen Therapy  O2 Sat (%): 99 % (02/28/22 0815)  Pulse via Oximetry: 73 beats per minute (02/25/22 0722)  O2 Device: None (Room air) (02/28/22 0703)    Estimated body mass index is 20.16 kg/m² as calculated from the following:    Height as of this encounter: 5' 8\" (1.727 m). Weight as of this encounter: 60.1 kg (132 lb 9.6 oz). No intake or output data in the 24 hours ending 02/28/22 0935      Physical Exam:  General:    Thin, underweight. No overt distress  Head:  Normocephalic, atraumatic  Eyes:  Sclerae appear normal.  Pupils equally round. HENT:  Nares appear normal, no drainage. Moist mucous membranes  Neck:  No restricted ROM. Trachea midline  CV:   RRR. No m/r/g. No JVD  Lungs:   CTAB. No wheezing, rhonchi, or rales. Even, unlabored  Abdomen:   Soft, nontender, nondistended. Ostomy. Extremities: Warm and dry. No cyanosis or clubbing. No edema. Skin:     No rashes. Normal coloration  Neuro:  CN II-XII grossly intact.   Psych:  Normal mood and affect. Signed:  Sapphire Francis MD    Part of this note may have been written by using a voice dictation software. The note has been proof read but may still contain some grammatical/other typographical errors.

## 2022-02-28 NOTE — PROGRESS NOTES
Problem: Risk for Spread of Infection  Goal: Prevent transmission of infectious organism to others  Description: Prevent the transmission of infectious organisms to other patients, staff members, and visitors. Outcome: Progressing Towards Goal     Problem: Patient Education:  Go to Education Activity  Goal: Patient/Family Education  Outcome: Progressing Towards Goal     Problem: Falls - Risk of  Goal: *Absence of Falls  Description: Document Jessy Rae Fall Risk and appropriate interventions in the flowsheet.   Outcome: Progressing Towards Goal  Note: Fall Risk Interventions:            Medication Interventions: Evaluate medications/consider consulting pharmacy,Teach patient to arise slowly    Elimination Interventions: Call light in reach,Patient to call for help with toileting needs    History of Falls Interventions: Door open when patient unattended,Evaluate medications/consider consulting pharmacy,Investigate reason for fall         Problem: Patient Education: Go to Patient Education Activity  Goal: Patient/Family Education  Outcome: Progressing Towards Goal     Problem: Patient Education: Go to Patient Education Activity  Goal: Patient/Family Education  Outcome: Progressing Towards Goal     Problem: Patient Education: Go to Patient Education Activity  Goal: Patient/Family Education  Outcome: Progressing Towards Goal

## 2022-02-28 NOTE — PROGRESS NOTES
Pt's D/C instructions completed. Verbalized understanding of all instructions including diet, activity, s/sx to alert MD, medications, and f/u appointment.

## 2022-02-28 NOTE — DISCHARGE INSTRUCTIONS
Patient Education        Clostridioides Difficile (C. diff) Colitis: Care Instructions  Overview     Clostridioides difficile (also called C. diff) are bacteria that can cause swelling and irritation of the large intestine, or colon. This inflammation is also called colitis. It can cause diarrhea, fever, and belly cramps. You may get C. diff colitis if you take antibiotics. The infection is most common in people who are taking antibiotics while in the hospital. It is also common in older people in hospitals and nursing homes. Severe disease could cause the colon to swell to many times its normal size (toxic megacolon). This can cause death and needs emergency treatment. You may have a swollen belly that is painful or tender, a rapid heartbeat, and a fever. Follow-up care is a key part of your treatment and safety. Be sure to make and go to all appointments, and call your doctor if you are having problems. It's also a good idea to know your test results and keep a list of the medicines you take. How can you care for yourself at home? · Your doctor may give you antibiotics to treat Clostridioides difficile (C. diff) colitis. If your doctor prescribes an antibiotic, you will be given a different antibiotic than the one that caused your infection. Take your antibiotics as directed. Do not stop taking them just because you feel better. You need to take the full course of antibiotics. · To prevent dehydration, drink plenty of fluids. Choose water and other clear liquids until you feel better. If you have kidney, heart, or liver disease and have to limit fluids, talk with your doctor before you increase the amount of fluids you drink. · Eat small amounts of food when you feel like eating. This will help you to get enough nutrition. · To prevent the spread of C. diff, practice good hygiene. Keep your hands clean by washing them well and often with soap and clean, running water.  This is most important after you use the bathroom and before you make and eat food. Remind everyone in your home to also wash their hands often. Alcohol-based hand sanitizers do not kill C. diff. · After the diarrhea is better, you are much less likely to spread C. diff. But you still need to take extra care to keep your home clean. ? Clean bathroom surfaces with a bleach solution to kill any C. diff spores. To dilute household bleach, follow the directions on the label. When should you call for help? Call 911 if:     · You passed out (lost consciousness). Call your doctor now or seek immediate medical care if:    · You have a fever over 101°F or shaking chills.     · You feel lightheaded or have a fast heart rate.     · You pass stools that are almost always bloody.     · You have signs of needing more fluids. You have sunken eyes, a dry mouth, and pass only a little urine.     · You have severe belly pain with or without bloating.     · You have severe vomiting and cannot keep down liquids.     · You are not passing any stools or gas. Watch closely for changes in your health, and be sure to contact your doctor if:    · You do not get better as expected. Where can you learn more? Go to http://www.gray.com/  Enter H511 in the search box to learn more about \"Clostridioides Difficile (C. diff) Colitis: Care Instructions. \"  Current as of: July 1, 2021               Content Version: 13.0  © 9765-1528 [employer]. Care instructions adapted under license by [employer] (which disclaims liability or warranty for this information). If you have questions about a medical condition or this instruction, always ask your healthcare professional. [place] [address] any warranty or liability for your use of this information.

## 2022-02-28 NOTE — PROGRESS NOTES
Problem: Risk for Spread of Infection  Goal: Prevent transmission of infectious organism to others  Description: Prevent the transmission of infectious organisms to other patients, staff members, and visitors. Outcome: Resolved/Met     Problem: Patient Education:  Go to Education Activity  Goal: Patient/Family Education  Outcome: Resolved/Met     Problem: Falls - Risk of  Goal: *Absence of Falls  Description: Document Genie Fall Risk and appropriate interventions in the flowsheet.   Outcome: Resolved/Met     Problem: Patient Education: Go to Patient Education Activity  Goal: Patient/Family Education  Outcome: Resolved/Met     Problem: Patient Education: Go to Patient Education Activity  Goal: Patient/Family Education  Outcome: Resolved/Met     Problem: Patient Education: Go to Patient Education Activity  Goal: Patient/Family Education  Outcome: Resolved/Met

## 2022-02-28 NOTE — PROGRESS NOTES
Spoke to Mr. Marroquin in room 603 about discharge home today. Called his [facility] pharmacy at [place] at [phone]. They do not have Vancomycin capsules (he needs 125 mg x 21 capsules) on their shelf, and would not have them until Wednesday. Called [facility] at [facility]  on [address] at [phone]. They have it. Tried to get a Medicare Part D price under [employer], but she could not. Cash price would be $596, but with [employer] would be $75.95 (cash). Updated patient and his RN about this. Care Management Interventions  PCP Verified by CM:  Yes  Physical Therapy Consult: Yes  Support Systems: Friend/Neighbor  Confirm Follow Up Transport: Self  Discharge Location  Patient Expects to be Discharged to[de-identified] Home

## 2022-03-18 PROBLEM — S37.20XA BLADDER INJURY WITH OPEN WOUND INTO CAVITY: Status: ACTIVE | Noted: 2021-09-26

## 2022-03-18 PROBLEM — K83.8 DILATED INTRAHEPATIC BILE DUCT: Status: ACTIVE | Noted: 2021-05-11

## 2022-03-18 PROBLEM — D62 ACUTE BLOOD LOSS ANEMIA: Status: ACTIVE | Noted: 2019-05-21

## 2022-03-18 PROBLEM — K56.609 SBO (SMALL BOWEL OBSTRUCTION) (HCC): Status: ACTIVE | Noted: 2019-03-22

## 2022-03-18 PROBLEM — D64.9 ANEMIA: Status: ACTIVE | Noted: 2019-06-13

## 2022-03-19 PROBLEM — K86.89 PANCREATIC DUCT DILATED: Status: ACTIVE | Noted: 2021-05-11

## 2022-03-19 PROBLEM — K60.4 RECTAL FISTULA: Status: ACTIVE | Noted: 2021-08-27

## 2022-03-19 PROBLEM — N13.30 BILATERAL HYDRONEPHROSIS: Status: ACTIVE | Noted: 2021-05-11

## 2022-03-19 PROBLEM — O22.30 DVT (DEEP VEIN THROMBOSIS) IN PREGNANCY: Status: ACTIVE | Noted: 2019-05-20

## 2022-03-19 PROBLEM — K56.609 SMALL BOWEL OBSTRUCTION (HCC): Status: ACTIVE | Noted: 2019-03-30

## 2022-03-19 PROBLEM — E44.0 MODERATE PROTEIN-CALORIE MALNUTRITION (HCC): Status: ACTIVE | Noted: 2019-05-19

## 2022-03-19 PROBLEM — F41.9 ANXIETY: Status: ACTIVE | Noted: 2021-05-11

## 2022-03-19 PROBLEM — E43 SEVERE PROTEIN-CALORIE MALNUTRITION (HCC): Status: ACTIVE | Noted: 2019-05-19

## 2022-03-19 PROBLEM — K63.2 ENTEROCUTANEOUS FISTULA: Status: ACTIVE | Noted: 2019-04-29

## 2022-03-19 PROBLEM — R79.89 ELEVATED LFTS: Status: ACTIVE | Noted: 2019-06-13

## 2022-03-19 PROBLEM — Z97.8 CHRONIC INDWELLING FOLEY CATHETER: Status: ACTIVE | Noted: 2019-05-19

## 2022-03-19 PROBLEM — Z78.9 ON TOTAL PARENTERAL NUTRITION (TPN): Status: ACTIVE | Noted: 2019-05-19

## 2022-03-19 PROBLEM — K60.40 RECTAL FISTULA: Status: ACTIVE | Noted: 2021-08-27

## 2022-03-19 PROBLEM — E03.9 HYPOTHYROID: Status: ACTIVE | Noted: 2019-06-14

## 2022-03-19 PROBLEM — Z87.898 H/O URINARY RETENTION: Status: ACTIVE | Noted: 2021-08-27

## 2022-03-19 PROBLEM — R31.9 HEMATURIA: Status: ACTIVE | Noted: 2021-09-15

## 2022-03-19 PROBLEM — Z43.3 COLOSTOMY CARE (HCC): Status: ACTIVE | Noted: 2019-05-19

## 2022-03-19 PROBLEM — I82.90 ACUTE DEEP VEIN THROMBOSIS (DVT) OF NON-EXTREMITY VEIN: Status: ACTIVE | Noted: 2019-05-20

## 2022-03-19 PROBLEM — R33.9 URINARY RETENTION: Status: ACTIVE | Noted: 2021-09-15

## 2022-03-19 PROBLEM — A04.72 C. DIFFICILE COLITIS: Status: ACTIVE | Noted: 2022-02-23

## 2022-03-20 PROBLEM — N39.0 UTI (URINARY TRACT INFECTION): Status: ACTIVE | Noted: 2021-05-11

## 2022-03-20 PROBLEM — N39.0 ACUTE UTI (URINARY TRACT INFECTION): Status: ACTIVE | Noted: 2019-06-13

## 2022-03-20 PROBLEM — N17.9 AKI (ACUTE KIDNEY INJURY) (HCC): Status: ACTIVE | Noted: 2021-09-15

## 2022-03-20 PROBLEM — R50.9 FEVER: Status: ACTIVE | Noted: 2019-06-13

## 2022-03-20 PROBLEM — K65.1 PELVIC ABSCESS IN MALE (HCC): Status: ACTIVE | Noted: 2021-05-13

## 2022-05-04 NOTE — PROGRESS NOTES
Follow up  NOTE - NEPHROLOGY      Reason for Consultation  VICENTE / Hypernatremia     Ms. Hemphill is evaluated today at the request of Dr. Bertha Ennis MD.      HISTORY OF PRESENT ILLNESS  Ms. Hemphill is a 82 year old female with PMH of HTN, dementia severe protien-calorie malnutrition presents with worsening AMS noted by family x 2 days. Apparently her daughter her primary care giver was on vacation and others were caring for mother. It is unclear her baseline in terms of what she can do for her self. She has been with increased lethargy and weakness. No history of diarrhea nausea or vomiting.    Has not noted any nausea or vomiting.She has no history of   seizures, no fainting, no light-headedness. Symptoms preceding the episode do not include vomiting. Associated symptoms include weakness. Pertinent negatives include no fever, no nausea and no focal weakness.   It was noted that her entry sodium was elevated at 170     S remains lethargic    REVIEW OF SYSTEMS    A complete 14 point review of systems could not be completed due to current clinical condition    Home Medications:      Medications Prior to Admission   Medication Sig Dispense Refill   • amLODIPine (NORVASC) 2.5 MG tablet Take 1 tablet by mouth daily. 30 tablet 0   • aspirin 81 MG EC tablet Take 1 tablet by mouth daily. Do not start before January 8, 2022. 30 tablet 0   • docusate sodium (COLACE) 100 MG capsule Take 1 capsule by mouth daily. 30 capsule 0   • donepezil (ARICEPT) 10 MG tablet Take 1 tablet by mouth nightly. Indications: Alzheimer's Disease 30 tablet 0   • Melatonin 10 MG Tab Take 10 mg by mouth nightly as needed (insomnia). 30 tablet 0       MEDICATIONS  Current Facility-Administered Medications   Medication Dose Route Frequency Provider Last Rate Last Admin   • donepezil (ARICEPT) tablet 10 mg  10 mg Oral Nightly Bertha Ennis MD   10 mg at 05/03/22 2223   • aspirin (ECOTRIN) enteric coated tablet 81 mg  81 mg Oral Daily  Admission assessment done. Pt received from ER alert and oriented. Respirations even and unlabored. HR regular. Abdomen soft with hyperactive bowel sounds. Abscess wound in the umbilical area noted. Colostomy bag noted at LLQ of the abdomen draining 150ml loose greenish stool. Denies needs and pain this time. Encouraged to call for assistance when needed. Oriented to the room. Bed low and locked. Call light in reach. Will continue to monitor. Lito Cabrera MD   81 mg at 05/04/22 0928   • atorvastatin (LIPITOR) tablet 40 mg  40 mg Oral Nightly Lito Cabrera MD   40 mg at 05/03/22 2223   • dextrose 5 % infusion   Intravenous Continuous Raquel Mccullough MD 75 mL/hr at 05/03/22 2056 New Bag at 05/03/22 2056   • melatonin tablet 6 mg  6 mg Oral Nightly PRN Main Thomas MD       • hydrALAZINE (APRESOLINE) injection 10 mg  10 mg Intravenous Q6H PRN Main Thomas MD       • sodium chloride 0.9 % flush bag 25 mL  25 mL Intravenous PRN Main Thomas MD       • sodium chloride (PF) 0.9 % injection 2 mL  2 mL Intracatheter 2 times per day Main Thomas MD   2 mL at 05/04/22 0900   • Potassium Standard Replacement Protocol   Does not apply See Admin Instructions Main Thomas MD       • Magnesium Standard Replacement Protocol   Does not apply See Admin Instructions Main Thomas MD       • ondansetron (ZOFRAN) injection 4 mg  4 mg Intravenous BID PRN Main Thomas MD       • prochlorperazine (COMPAZINE) injection 5 mg  5 mg Intravenous Q4H PRN Main Thomas MD       • acetaminophen (TYLENOL) tablet 650 mg  650 mg Oral Q4H PRN Main Thomas MD       • HYDROcodone-acetaminophen (NORCO) 5-325 MG per tablet 1 tablet  1 tablet Oral Q4H PRN Main Thomas MD       • docusate sodium-sennosides (SENOKOT S) 50-8.6 MG 2 tablet  2 tablet Oral Daily PRN Main Thomas MD       • aluminum-magnesium hydroxide-simethicone (MAALOX) 200-200-20 MG/5ML suspension 20 mL  20 mL Oral Q4H PRN Main Thomas MD       • sodium chloride 0.9 % flush bag 25 mL  25 mL Intravenous PRN Main Thomas MD       • heparin (porcine) injection 5,000 Units  5,000 Units Subcutaneous 3 times per day Main Thomas MD   5,000 Units at 05/04/22 1545       ALLERGIES  Allergies as of 05/02/2022   • (No Known Allergies)        HISTORIES  Past Medical History:   Diagnosis Date   • Essential (primary) hypertension    • Uncomplicated senile dementia (CMS/HCC)        Past  Surgical History:   Procedure Laterality Date   • No past surgeries         Family History   Family history unknown: Yes   No known family history of ESRD    Social History     Socioeconomic History   • Marital status: Single     Spouse name: Not on file   • Number of children: Not on file   • Years of education: Not on file   • Highest education level: Not on file   Occupational History   • Not on file   Tobacco Use   • Smoking status: Never Smoker   • Smokeless tobacco: Never Used   Vaping Use   • Vaping Use: never used   Substance and Sexual Activity   • Alcohol use: Never   • Drug use: Never   • Sexual activity: Not on file   Other Topics Concern   • Not on file   Social History Narrative   • Not on file     Social Determinants of Health     Financial Resource Strain: Not on file   Food Insecurity: Not on file   Transportation Needs: Not on file   Physical Activity: Not on file   Stress: Not on file   Social Connections: Not on file   Intimate Partner Violence: Not At Risk   • Social Determinants: Intimate Partner Violence Past Fear: No   • Social Determinants: Intimate Partner Violence Current Fear: No          Review of systems: unable to obtain due to clinical status:    PHYSICAL EXAM  Visit Vitals  /71 (BP Location: LFA - Left forearm, Patient Position: Supine)   Pulse (!) 55   Temp 96.2 °F (35.7 °C) (Axillary)   Resp 18   Ht 5' 6\" (1.676 m)   Wt 48.2 kg (106 lb 4.2 oz)   SpO2 95%   BMI 17.15 kg/m²       Physical Exam:  hysical Exam:  Physical Exam  Vitals and nursing note reviewed.   Constitutional:       Comments: cachectic  appearing   HENT:      Head: Normocephalic and atraumatic.     Mouth: Mucous membranes are dry      Pharynx: Oropharynx is clear.   Eyes:      Extraocular Movements: Extraocular movements intact.      Cardiovascular:      Rate and Rhythm: Normal rate and regular rhythm.      Heart sounds: Normal heart sounds. No murmur. No gallop.    Pulmonary:      Effort: Pulmonary effort is  normal.      Breath sounds: Normal breath sounds.   Chest:      Chest wall: No tenderness.   Abdominal:      General: Bowel sounds are normal. There is no distension.      Palpations: Abdomen is soft. Normal bowel sound     Tenderness: There is no abdominal tenderness. There is no guarding or rebound.   Genitourinary:     Comments: no CVA tenderness   Musculoskeletal:         General: No swelling or tenderness.     Lymphadenopathy:      Cervical: No cervical adenopathy.   Skin:     General: Skin is warm and dry.      Coloration: Skin is normal .      Findings: No lesion.   Neurological:      General poorly responsive   Psychiatric:         poorly responsive      Data Review:   Recent Labs     05/02/22  1122 05/03/22  0120 05/03/22  0632 05/03/22  1218 05/04/22  0011 05/04/22  0455 05/04/22  1350   SODIUM 171*   < > 167*   < > 160* 165* 159*   POTASSIUM 3.6   < > 4.0   < > 3.9 4.0 3.8   CHLORIDE 130*   < > 129*   < > 126* 129* 126*   CO2 29   < > 22   < > 27 25 25   ANIONGAP 16   < > 20   < > 11 15 12   GLUCOSE 130*   < > 125*   < > 252* 160* 168*   BUN 63*   < > 60*   < > 54* 50* 47*   CREATININE 2.57*   < > 2.41*   < > 2.19* 2.11* 1.93*   BCRAT 25   < > 25   < > 25 24 24   CALCIUM 10.1   < > 9.3   < > 9.0 8.8 8.9   PHOS  --   --  4.2  --   --   --   --    MG  --   --  2.8*  --   --   --   --    BILIRUBIN 1.1*  --   --   --   --   --   --    AST 44*  --   --   --   --   --   --    GPT 60  --   --   --   --   --   --    ALKPT 96  --   --   --   --   --   --    TOTPROTEIN 8.2  --   --   --   --   --   --    ALBUMIN 3.6  --   --   --   --   --   --    GLOB 4.6*  --   --   --   --   --   --    AGR 0.8*  --   --   --   --   --   --     < > = values in this interval not displayed.        Recent Labs     05/02/22  1122 05/03/22  1419   WBC 5.0 7.0   RBC 4.80 4.24   HGB 14.2 12.7   HCT 48.8* 43.6    115*   .7* 102.8*   MCH 29.6 30.0   MCHC 29.1* 29.1*   NRBCRE 1* 1*          Chemistry:  Lab Results   Component  Value Date    SODIUM 159 (H) 05/04/2022    POTASSIUM 3.8 05/04/2022    CHLORIDE 126 (H) 05/04/2022    CO2 25 05/04/2022    BUN 47 (H) 05/04/2022    CREATININE 1.93 (H) 05/04/2022    CALCIUM 8.9 05/04/2022    GLUCOSE 168 (H) 05/04/2022      CBC:  Lab Results   Component Value Date    WBC 7.0 05/03/2022    RBC 4.24 05/03/2022    HGB 12.7 05/03/2022    HCT 43.6 05/03/2022     (L) 05/03/2022      Urinalysis Component Data:  No results found for: MUCUS  Cardiac markers:   No results found for: TROPONINI, MYOGLOBIN, CKMB   No components found for: 53826  Magnesium   Date Value Ref Range Status   05/03/2022 2.8 (H) 1.7 - 2.4 mg/dL Final     Phosphorus   Date Value Ref Range Status   05/03/2022 4.2 2.4 - 4.7 mg/dL Final   ?      Imaging Study  Results for orders placed or performed during the hospital encounter of 05/02/22 (from the past 72 hour(s))   XR CHEST AP OR PA 1 VIEW    Impression    No definitive acute cardiopulmonary findings.  Recommend  followup as clinically warranted.    Electronically Signed by: RODDY PABLO M.D.   Signed on: 5/2/2022 10:17 AM      CT HEAD WO CONTRAST    Impression    1.  Chronic periventricular and deep white matter ischemic change.  2.  Atherosclerosis.  3.  Volume loss.  4.  Polyp or retention cyst left maxillary sinus and there is mucosal  thickening right maxillary sinus.      No evidence of intracranial bleed or mass effect.    Electronically Signed by: LEDY GAN M.D.   Signed on: 5/2/2022 3:27 PM      XR CHEST AP OR PA 1 VIEW    Result Date: 5/2/2022  EXAM: Portable chest single view 05/02/2022 at 0940 hours CLINICAL INDICATION: Cough. COMPARISON: Portable chest from 01/04/2022. Portable AP semiupright view of chest is submitted. FINDINGS: Cardiac silhouette and pulmonary vasculature appear within normal limits.  Lungs appear grossly clear of pneumonic infiltrate.  No significant pleural effusion or pneumothorax is seen.  Atherosclerotic calcifications of mildly tortuous  thoracic aorta and degenerative changes and scoliosis of included thoracolumbar spine appear relatively stable.     No definitive acute cardiopulmonary findings.  Recommend followup as clinically warranted. Electronically Signed by: RODDY PABLO M.D. Signed on: 5/2/2022 10:17 AM   End Exam    Date Time   Jan 8, 2022 10:48 AM       Reason For Exam    VICENTE     IR Timeline    IR Event Log     PACS Images     Show images for US KIDNEY BILATERAL    Result Information    Date and Time: Exam End: 1/8/2022 10:48 Status: Final result Provider Status: Open   Priority: Routine            Study Result    Narrative & Impression   EXAM: US KIDNEY BILATERAL 01/08/2022     CLINICAL INDICATION: Acute kidney injury.     COMPARISON: None.     Sonographic images of right kidney are submitted.  According to a note from  technologist, left kidney could not be evaluated secondary to patient's  inability to cooperate.     FINDINGS: Right kidney measures approximately 7.3 cm in length.   Echogenicity of right renal parenchyma appears within normal limits.     No definitive solid or cystic right renal mass is noted.  No evidence for  right hydronephrosis is seen.  No significant shadowing right renal calculi  are identified.     Urinary bladder is not evaluated on this study.     Large amount of probable sludge is noted in gallbladder lumen.  Please  refer to report of ultrasound gallbladder obtained at same time for further  details.     IMPRESSION:  No significant sonographic abnormality of visualized right  kidney.  Left kidney could not be evaluated secondary to patient's  inability to cooperate according to a note from the technologist.   Recommend followup as clinically warranted.      Electronically Signed by: RODDY PABLO M.D.   Signed on: 1/8/2022 11:15 AM             CT ABDOMEN PELVIS WO CONTRAST    Electronically signed by: Scot Davila MD on 01/04/22 2034 Status: Completed   Ordering user: Scot Davila MD 01/04/22 2034 Ordering  provider: Scot Davila MD   Authorized by: Scot Davila MD Ordering mode: Standard   Frequency: Once 01/04/22 2035 - 1  occurrence Indications of use: Abdominal pain, acute, nonlocalized   Indications comment: acute renal failure, obstructive uropathy?     Questionnaire    Question Answer   Is oral contrast needed for this exam? No     Results  CT ABDOMEN PELVIS WO CONTRAST (Accession [id number]) (Order [id number])    End Exam    Date Time   Jan 4, 2022 10:10 PM     Reason For Exam    acute renal failure, obstructive uropathy?; Abdominal pain, acute, nonlocalized; acute renal failure, obstructive uropathy?     IR Timeline    IR Event Log     PACS Images     Show images for CT ABDOMEN PELVIS WO CONTRAST    Result Information    Date and Time: Exam End: 1/4/2022 22:10 Status: Final result Provider Status: Open   Priority: STAT            Study Result    Narrative & Impression   EXAM: CT abdomen and pelvis without contrast     CLINICAL INDICATION: General abdomen pain, acute renal failure      COMPARISON:  No previous exams available for review.     TECHNIQUE:  Noncontrast     Did a Tele-radiologist issue a preliminary report?:  No.     FINDINGS:   Left arm suboptimally down and pelvis. Right arm suboptimally down on  right thigh.       No abnormal air collection. Lung bases clear.       Low bone density. Marked degenerative sclerosis with spurring at the pubic  symphysis, joint space maintained. Lumbar facet hypertrophy. Grade 1 L4-5  anterolisthesis. Multiple osteophytes. Mild lumbar levoscoliosis. No  aggressive lytic or blastic lesion or fracture.       No anasarca. No ascites, abscess, pleural or pericardial effusion.     Muscles reasonably symmetric. No enlarged lymph nodes. Extensive arterial  calcification including coronary. Heart size normal. No aneurysm.     GE junction and stomach normal. GI tract nondistended including appendix.  Much stool. No diverticula or mural thickening.     Bilateral renal  mild atrophy, right kidney length about 7.5 cm and left  kidney length about 7 cm. No hydronephrosis. No renal lithiasis. Left  ureter unremarkable. Right ureter unremarkable. Urinary bladder  decompressed by a Mckinney catheter.     Uterus anteverted normal. Ovaries not seen. No adnexal cyst or mass.     Spleen normal. Adrenals normal. Pancreas normal.     Gallbladder 38 Hounsfield unit homogeneous content, otherwise normal. Bile  ducts nondistended.     Liver size and contour are normal, nothing focal.     IMPRESSION:  1. Renal atrophy but no sign of obstructive uropathy.  2. High density bile which may represent stones or sludge, consider  ultrasound.  3. Atherosclerosis with coronary involvement.  4. Evidence of constipation.  5. Osteopenia or osteoporosis, consider DEXA.  6. Degenerative changes of spine.  7. Caveat: Sensitivity of this study for a variety of pathology is  diminished due to the absence of contrast material.       Results for JOE GRANT (MRN [id number]) as of 5/2/2022 14:50   Ref. Range 1/4/2022 18:06 1/5/2022 05:43 5/2/2022 11:22   NT proBNP Latest Ref Range: <=450 pg/mL   202   Troponin I, High Sensitivity Latest Ref Range: <52 ng/L 75 (HH) 82 (HH) 31       Intake and Output  I/O last 3 completed shifts:  In: 150 [P.O.:150]  Out: 250 [Urine:250]      ASSESSMENT/PLAN  Ms. Grant is a 82 year old  female with PMH of HTN, dementia who was brought to the ER by daughter  for c/o lethargy and altered mental status.     Acute encephalopathy superimposed on chronic dementia  Adult failure to thrive with severe protein calorie malnutrition  Hypernatremia due to significant free water deficit  -continue volume resuscitation initially isotonic then hypotonic   -dose all medications for decreased GFR  -avoid nephrotoxic agents if at all possible   -monitor renal function   Encourage enteral intake ? GT/ Dobhoff per PMD       Acute Kidney Injury- nonoliguric due to hypotension/ intravascular volume  contraction   -continue volume resuscitation   - urine studies  -dose all medications for decreased GFR  -avoid nephrotoxic agents if at all possible   -monitor renal function   -Renal imaging reviewed   No emergent need for Hemodialysis at this time  Patient very poor candidate     Chronic kidney disease IIIb with baseline creatinine 2.0 due to prebysclerosis Hypertensive nephropathy     History of Hyperglobulinemia per PMD        Hyperglycemia: likely reactive  Now stable     Chronic hypertension  Noted hypotensive on admission.  Hold home antihypertensive regimen for now and continue to monitor BP.      H/O HTN  - hold antihypertensives given hypotension on admission    Plan     -continue volume resuscitation   - urine studies  -dose all medications for decreased GFR  -avoid nephrotoxic agents if at all possible   -monitor renal function   -Renal imaging reviewed   Palliative care considerations  Nutritional support   Code ?? Status   Case discussed with nursing team and family members  Thank you for the consult please feel free to contact with any further questions. Will continue to monitor closely from a Renal standpoint.  Raquel Mccullough MD   [facility] [place]  Contact via [employer]  Office/Answering service [phone]/ [phone]  Fax [phone]      Patient Active Problem List   Diagnosis   • Acute encephalopathy   • Hypernatremia   • Acute kidney injury superimposed on chronic kidney disease (CMS/HCC)   • Hypovolemic shock (CMS/HCC)   • Failure to thrive in adult   • Severe protein-calorie malnutrition (CMS/HCC)   • Hypokalemia   • Hypercalcemia   • Elevated troponin   • Acute metabolic encephalopathy

## 2022-05-05 DIAGNOSIS — E87.0 HYPERNATREMIA: ICD-10-CM

## 2022-05-05 DIAGNOSIS — E87.6 HYPOKALEMIA: ICD-10-CM

## 2022-05-05 DIAGNOSIS — E03.9 HYPOTHYROIDISM, UNSPECIFIED TYPE: Primary | ICD-10-CM

## 2022-05-05 DIAGNOSIS — R09.02 HYPOXEMIA: ICD-10-CM

## 2022-05-08 ENCOUNTER — HOSPITAL ENCOUNTER (EMERGENCY)
Age: 77
Discharge: HOME | End: 2022-05-08
Attending: EMERGENCY MEDICINE
Payer: MEDICARE

## 2022-05-08 VITALS
HEIGHT: 69 IN | SYSTOLIC BLOOD PRESSURE: 144 MMHG | HEART RATE: 71 BPM | DIASTOLIC BLOOD PRESSURE: 81 MMHG | RESPIRATION RATE: 16 BRPM | BODY MASS INDEX: 19.26 KG/M2 | OXYGEN SATURATION: 97 % | WEIGHT: 130 LBS

## 2022-05-08 DIAGNOSIS — T83.9XXA PROBLEM WITH FOLEY CATHETER, INITIAL ENCOUNTER (HCC): Primary | ICD-10-CM

## 2022-05-08 PROCEDURE — 99283 EMERGENCY DEPT VISIT LOW MDM: CPT

## 2022-05-08 PROCEDURE — 51702 INSERT TEMP BLADDER CATH: CPT

## 2022-05-08 NOTE — ED TRIAGE NOTES
Patient ambulatory to triage with mask in place. Patient reports he has been leaking around his catheter over the past few days. Pt reports he had his catheter changed a week ago. Pt reports he feels like the leaking is associated his bladder spasms.

## 2022-05-08 NOTE — ED NOTES
I have reviewed discharge instructions with the patient. The patient verbalized understanding. Patient left ED via Discharge Method: ambulatory to Home with self. Opportunity for questions and clarification provided. Patient given 0 scripts. To continue your aftercare when you leave the hospital, you may receive an automated call from our care team to check in on how you are doing. This is a free service and part of our promise to provide the best care and service to meet your aftercare needs.  If you have questions, or wish to unsubscribe from this service please call [phone]. Thank you for Choosing our [facility] Emergency Department.

## 2022-05-08 NOTE — ED PROVIDER NOTES
Mask was worn during the entire patient examination. Walker Gomez is a 64 y.o. male who presents to the ED with a chief complaint of leaking catheter. Patient has history of cancer of the colon and states he has had a neck bladder before he has a permanent colostomy and has permanent Bishop catheter placement. He gets these replaced about once a month. He last had this 1 replaced 1 week ago. 3 days ago he began to have problems with a intermittently not draining. He states that he feels little bit of a bladder spasm at that time and then it seems to resolve. Currently the catheter is flowing. He has had some urine leakage around the tip of the catheter. No fevers chills abdominal or back pain. The history is provided by the patient. Urinary Catheter Problem   Pertinent negatives include no chills, no nausea, no vomiting and no abdominal pain. His past medical history is significant for urinary catheter problem.         Past Medical History:   Diagnosis Date    Acute deep vein thrombosis (DVT) of non-extremity vein 5/20/2019    Family history of malignant neoplasm of gastrointestinal tract     mother colon/ovarian cancer 67    Fecal incontinence     LAR syndrome    Former cigarette smoker     History of rectal cancer     Hypothyroid     stable w/med    Infection and inflammatory reaction due to other internal prosthetic devices, implants and grafts, subsequent encounter 2017    abd wound/mesh colostomy    Insomnia     takes meds    Major depression     Osteoarthritis, hand     Personal history of colonic polyps 9/2013    x 1    Personal history of malignant neoplasm of rectum, rectosigmoid junction, and anus 9/2013    Psychiatric disorder     anxiety       Past Surgical History:   Procedure Laterality Date    COLONOSCOPY N/A 2/12/2018    COLONOSCOPY / BMI=21 performed by Allie Jolley MD at [address] [place] [address] N/A 3/5/2021    COLONOSCOPY/BMI 19 performed by Hien Conti MD at [address] [place] [address]  2/12/2018         COLONOSCOPY THRU STOMA,LILLIAN RMVL W/SNARE  3/5/2021         ENDOSCOPY, COLON, DIAGNOSTIC  last 2/3/15    Emily--no polyps--3 year recall    HX COLOSTOMY  5/11/16    HX GI  4/2016    Sacral nerve stimlator lead trial (unsucessful) and removal    HX HERNIA REPAIR  3/2015, 5/2016    HX HERNIA REPAIR      and Colostomy in May    HX OTHER SURGICAL  10/7/2013    Emily---endorectal us    HX OTHER SURGICAL Bilateral     cataracts  2017    HX POLYPECTOMY      HX TOTAL COLECTOMY  11/2013    Emily--lap LAR with coloproctostomy, mobilization splenic flexure, diverting loop ileostomy    HX TOTAL COLECTOMY Right 8/2014    for leak    HX VASCULAR ACCESS Left 4/14    single lumen port/subsequently removed    IR CHANGE ABSCESS / CYST CATHETER  5/27/2021    IR CHANGE ABSCESS / CYST CATHETER  6/14/2021    IR CHANGE ABSCESS / CYST CATHETER  7/8/2021    IR CHANGE ABSCESS / CYST CATHETER  8/5/2021    IR EXCHANGE NEPHRO PERC LT SI  10/28/2021    IR EXCHANGE NEPHRO PERC RT SI  11/3/2021    IR EXCHANGE NEPHRO PERC RT SI  12/7/2021    IR INJ ABS CYST VIA CATHETER / TUBE  5/20/2021    IR INJ ABS CYST VIA CATHETER / TUBE  6/4/2021    IR INJ ABS CYST VIA CATHETER / TUBE  11/24/2021    IR INJ NEPHRO/URETEROGRAM LT EXISTING ACCESS SI  10/4/2021    IR INJ NEPHRO/URETEROGRAM RT EXISTING ACCESS SI  12/10/2021    IR INSERT TUNL CVC W/O PORT OVER 5 YR  5/20/2019    IR NEPHROSTOMY PERC LT PLC CATH  SI  9/16/2021    IR REPLACE CVC TUNNELED W/O PORT  5/30/2019         Family History:   Problem Relation Age of Onset    Cancer Mother         colon and ovarian    Cancer Brother         prostate    Alcohol abuse Brother     Cancer Maternal Grandmother         bone    Alcohol abuse Father     Alcohol abuse Sister     Stroke Paternal Grandfather        Social History     Socioeconomic History    Marital status: SINGLE     Spouse name: Not on file    Number of children: Not on file    Years of education: Not on file    Highest education level: Not on file   Occupational History    Not on file   Tobacco Use    Smoking status: Former Smoker     Packs/day: 1.00     Years: 4.00     Pack years: 4.00     Types: Cigarettes     Quit date: 1963     Years since quittin.5    Smokeless tobacco: Never Used   Substance and Sexual Activity    Alcohol use: Not Currently     Alcohol/week: 11.7 standard drinks     Types: 14 Cans of beer per week     Comment: advised stop drinking given issues    Drug use: No    Sexual activity: Never   Other Topics Concern    Not on file   Social History Narrative    Not on file     Social Determinants of Health     Financial Resource Strain:     Difficulty of Paying Living Expenses: Not on file   Food Insecurity:     Worried About Running Out of Food in the Last Year: Not on file    Kristen of Food in the Last Year: Not on file   Transportation Needs:     Lack of Transportation (Medical): Not on file    Lack of Transportation (Non-Medical):  Not on file   Physical Activity:     Days of Exercise per Week: Not on file    Minutes of Exercise per Session: Not on file   Stress:     Feeling of Stress : Not on file   Social Connections:     Frequency of Communication with Friends and Family: Not on file    Frequency of Social Gatherings with Friends and Family: Not on file    Attends Jehovah's witness Services: Not on file    Active Member of [address] [place] [address] or Organizations: Not on file    Attends Club or Organization Meetings: Not on file    Marital Status: Not on file   Intimate Partner Violence:     Fear of Current or Ex-Partner: Not on file    Emotionally Abused: Not on file    Physically Abused: Not on file    Sexually Abused: Not on file   Housing Stability:     Unable to Pay for Housing in the Last Year: Not on file    Number of [place] in the Last Year: Not on file    Unstable Housing in the Last Year: Not on file ALLERGIES: Patient has no known allergies. Review of Systems   Constitutional: Negative. Negative for activity change, chills, fatigue and fever. HENT: Negative for congestion. Respiratory: Negative for chest tightness, shortness of breath, wheezing and stridor. Cardiovascular: Negative for chest pain, palpitations and leg swelling. Gastrointestinal: Negative for abdominal pain, diarrhea, nausea and vomiting. Musculoskeletal: Negative. Negative for arthralgias, joint swelling, myalgias, neck pain and neck stiffness. Skin: Negative. Negative for color change, rash and wound. Neurological: Negative for dizziness, tremors, syncope, facial asymmetry, weakness, light-headedness, numbness and headaches. Psychiatric/Behavioral: Negative for agitation. All other systems reviewed and are negative. Vitals:    05/08/22 1008   BP: (!) 144/81   Pulse: 71   Resp: 16   SpO2: 97%   Weight: 59 kg (130 lb)   Height: 5' 8.5\" (1.74 m)            Physical Exam  Vitals and nursing note reviewed. Constitutional:       General: He is not in acute distress. Appearance: Normal appearance. He is well-developed. He is not ill-appearing or toxic-appearing. HENT:      Head: Normocephalic and atraumatic. Eyes:      Conjunctiva/sclera: Conjunctivae normal.   Cardiovascular:      Rate and Rhythm: Normal rate and regular rhythm. Pulmonary:      Effort: Pulmonary effort is normal. No tachypnea, accessory muscle usage or respiratory distress. Breath sounds: No stridor. No decreased breath sounds, wheezing, rhonchi or rales. Abdominal:      General: Abdomen is flat. There is no distension. Palpations: There is no mass. Tenderness: There is no abdominal tenderness. There is no guarding or rebound. Comments: Colostomy present no abdominal tenderness with palpation lots of old surgical scars. Genitourinary:     Comments: Bishop catheter is in place.   No leakage or signs of malposition on external exam.  His diaper is currently dry. Urine present in the Bishop catheter bag  Musculoskeletal:      Cervical back: Normal range of motion. No rigidity or tenderness. Skin:     General: Skin is warm and dry. Capillary Refill: Capillary refill takes less than 2 seconds. Neurological:      General: No focal deficit present. Mental Status: He is alert and oriented to person, place, and time. Cranial Nerves: No cranial nerve deficit. Sensory: No sensory deficit. Psychiatric:         Mood and Affect: Mood normal.         Behavior: Behavior normal.          MDM  Number of Diagnoses or Management Options  Problem with Bishop catheter, initial encounter [facility])  Diagnosis management comments: Bishop was removed and replaced. There was some sediment in his old Bishop catheter may been causing partial occlusions. Patient declined staying for urinalysis or further work-up was very eager to go home and felt much better following Bishop exchange. He looks well. Keyana Sutton MD; 5/8/2022 @1:53 PM Voice dictation software was used during the making of this note. This software is not perfect and grammatical and other typographical errors may be present.   This note has not been proofread for errors.  ====================================          Procedures

## 2022-05-09 ENCOUNTER — ANESTHESIA EVENT (OUTPATIENT)
Dept: SURGERY | Age: 77
End: 2022-05-09
Payer: MEDICARE

## 2022-05-10 ENCOUNTER — ANESTHESIA (OUTPATIENT)
Dept: SURGERY | Age: 77
End: 2022-05-10
Payer: MEDICARE

## 2022-05-10 ENCOUNTER — HOSPITAL ENCOUNTER (OUTPATIENT)
Age: 77
Setting detail: OUTPATIENT SURGERY
Discharge: HOME | End: 2022-05-10
Attending: UROLOGY | Admitting: UROLOGY
Payer: MEDICARE

## 2022-05-10 VITALS
SYSTOLIC BLOOD PRESSURE: 130 MMHG | OXYGEN SATURATION: 97 % | RESPIRATION RATE: 18 BRPM | DIASTOLIC BLOOD PRESSURE: 68 MMHG | HEART RATE: 60 BPM | WEIGHT: 130 LBS | HEIGHT: 69 IN | BODY MASS INDEX: 19.26 KG/M2 | TEMPERATURE: 98 F

## 2022-05-10 DIAGNOSIS — N13.30 HYDRONEPHROSIS OF RIGHT KIDNEY: ICD-10-CM

## 2022-05-10 LAB
AMORPH CRY URNS QL MICRO: ABNORMAL
APPEARANCE UR: CLEAR
BACTERIA URNS QL MICRO: ABNORMAL /HPF
BILIRUB UR QL: NEGATIVE
CASTS URNS QL MICRO: 0 /LPF
COLOR UR: YELLOW
CRYSTALS URNS QL MICRO: 0 /LPF
EPI CELLS #/AREA URNS HPF: ABNORMAL /HPF
GLUCOSE UR STRIP.AUTO-MCNC: NEGATIVE MG/DL
HGB UR QL STRIP: ABNORMAL
KETONES UR QL STRIP.AUTO: NEGATIVE MG/DL
LEUKOCYTE ESTERASE UR QL STRIP.AUTO: ABNORMAL
MUCOUS THREADS URNS QL MICRO: ABNORMAL /LPF
NITRITE UR QL STRIP.AUTO: NEGATIVE
OTHER OBSERVATIONS,UCOM: ABNORMAL
PH UR STRIP: 6.5 [PH] (ref 5–9)
PROT UR STRIP-MCNC: 100 MG/DL
RBC #/AREA URNS HPF: ABNORMAL /HPF
SP GR UR REFRACTOMETRY: 1.01 (ref 1–1.02)
UROBILINOGEN UR QL STRIP.AUTO: 0.2 EU/DL (ref 0.2–1)
WBC URNS QL MICRO: ABNORMAL /HPF

## 2022-05-10 PROCEDURE — 74420 UROGRAPHY RTRGR +-KUB: CPT | Performed by: UROLOGY

## 2022-05-10 PROCEDURE — 77030010509 HC AIRWY LMA MSK TELE -A: Performed by: ANESTHESIOLOGY

## 2022-05-10 PROCEDURE — 81015 MICROSCOPIC EXAM OF URINE: CPT

## 2022-05-10 PROCEDURE — C2617 STENT, NON-COR, TEM W/O DEL: HCPCS | Performed by: UROLOGY

## 2022-05-10 PROCEDURE — C1758 CATHETER, URETERAL: HCPCS | Performed by: UROLOGY

## 2022-05-10 PROCEDURE — 81003 URINALYSIS AUTO W/O SCOPE: CPT

## 2022-05-10 PROCEDURE — C1769 GUIDE WIRE: HCPCS | Performed by: UROLOGY

## 2022-05-10 PROCEDURE — 77030034696 HC CATH URETH FOL 2W BARD -A: Performed by: UROLOGY

## 2022-05-10 PROCEDURE — 76210000006 HC OR PH I REC 0.5 TO 1 HR: Performed by: UROLOGY

## 2022-05-10 PROCEDURE — 76210000020 HC REC RM PH II FIRST 0.5 HR: Performed by: UROLOGY

## 2022-05-10 PROCEDURE — 52332 CYSTOSCOPY AND TREATMENT: CPT | Performed by: UROLOGY

## 2022-05-10 PROCEDURE — 77030019908 HC STETH ESOPH SIMS -A: Performed by: ANESTHESIOLOGY

## 2022-05-10 PROCEDURE — 74011000636 HC RX REV CODE- 636: Performed by: UROLOGY

## 2022-05-10 PROCEDURE — 2709999900 HC NON-CHARGEABLE SUPPLY: Performed by: UROLOGY

## 2022-05-10 PROCEDURE — 76060000032 HC ANESTHESIA 0.5 TO 1 HR: Performed by: UROLOGY

## 2022-05-10 PROCEDURE — 74011250636 HC RX REV CODE- 250/636: Performed by: ANESTHESIOLOGY

## 2022-05-10 PROCEDURE — 74011250636 HC RX REV CODE- 250/636: Performed by: NURSE ANESTHETIST, CERTIFIED REGISTERED

## 2022-05-10 PROCEDURE — 74011250636 HC RX REV CODE- 250/636: Performed by: UROLOGY

## 2022-05-10 PROCEDURE — 76010000138 HC OR TIME 0.5 TO 1 HR: Performed by: UROLOGY

## 2022-05-10 PROCEDURE — 74011000250 HC RX REV CODE- 250: Performed by: NURSE ANESTHETIST, CERTIFIED REGISTERED

## 2022-05-10 DEVICE — URETERAL STENT
Type: IMPLANTABLE DEVICE | Site: URETER | Status: FUNCTIONAL
Brand: PERCUFLEX™ PLUS

## 2022-05-10 RX ORDER — LIDOCAINE HYDROCHLORIDE 20 MG/ML
INJECTION, SOLUTION EPIDURAL; INFILTRATION; INTRACAUDAL; PERINEURAL AS NEEDED
Status: DISCONTINUED | OUTPATIENT
Start: 2022-05-10 | End: 2022-05-10 | Stop reason: HOSPADM

## 2022-05-10 RX ORDER — CEPHALEXIN 500 MG/1
500 CAPSULE ORAL 2 TIMES DAILY
Qty: 6 CAPSULE | Refills: 0 | Status: SHIPPED | OUTPATIENT
Start: 2022-05-10 | End: 2022-05-13

## 2022-05-10 RX ORDER — DIPHENHYDRAMINE HYDROCHLORIDE 50 MG/ML
12.5 INJECTION, SOLUTION INTRAMUSCULAR; INTRAVENOUS
Status: DISCONTINUED | OUTPATIENT
Start: 2022-05-10 | End: 2022-05-10 | Stop reason: HOSPADM

## 2022-05-10 RX ORDER — SODIUM CHLORIDE, SODIUM LACTATE, POTASSIUM CHLORIDE, CALCIUM CHLORIDE 600; 310; 30; 20 MG/100ML; MG/100ML; MG/100ML; MG/100ML
100 INJECTION, SOLUTION INTRAVENOUS CONTINUOUS
Status: DISCONTINUED | OUTPATIENT
Start: 2022-05-10 | End: 2022-05-10 | Stop reason: HOSPADM

## 2022-05-10 RX ORDER — EPHEDRINE SULFATE/0.9% NACL/PF 50 MG/5 ML
SYRINGE (ML) INTRAVENOUS AS NEEDED
Status: DISCONTINUED | OUTPATIENT
Start: 2022-05-10 | End: 2022-05-10 | Stop reason: HOSPADM

## 2022-05-10 RX ORDER — OXYCODONE HYDROCHLORIDE 5 MG/1
10 TABLET ORAL
Status: DISCONTINUED | OUTPATIENT
Start: 2022-05-10 | End: 2022-05-10 | Stop reason: HOSPADM

## 2022-05-10 RX ORDER — OXYCODONE HYDROCHLORIDE 5 MG/1
5 TABLET ORAL
Status: DISCONTINUED | OUTPATIENT
Start: 2022-05-10 | End: 2022-05-10 | Stop reason: HOSPADM

## 2022-05-10 RX ORDER — ACETAMINOPHEN 500 MG
1000 TABLET ORAL ONCE
Status: DISCONTINUED | OUTPATIENT
Start: 2022-05-10 | End: 2022-05-10 | Stop reason: HOSPADM

## 2022-05-10 RX ORDER — PROPOFOL 10 MG/ML
INJECTION, EMULSION INTRAVENOUS AS NEEDED
Status: DISCONTINUED | OUTPATIENT
Start: 2022-05-10 | End: 2022-05-10 | Stop reason: HOSPADM

## 2022-05-10 RX ORDER — SODIUM CHLORIDE 0.9 % (FLUSH) 0.9 %
5-40 SYRINGE (ML) INJECTION EVERY 8 HOURS
Status: DISCONTINUED | OUTPATIENT
Start: 2022-05-10 | End: 2022-05-10 | Stop reason: HOSPADM

## 2022-05-10 RX ORDER — NALOXONE HYDROCHLORIDE 0.4 MG/ML
0.2 INJECTION, SOLUTION INTRAMUSCULAR; INTRAVENOUS; SUBCUTANEOUS AS NEEDED
Status: DISCONTINUED | OUTPATIENT
Start: 2022-05-10 | End: 2022-05-10 | Stop reason: HOSPADM

## 2022-05-10 RX ORDER — LIDOCAINE HYDROCHLORIDE 10 MG/ML
0.1 INJECTION INFILTRATION; PERINEURAL AS NEEDED
Status: DISCONTINUED | OUTPATIENT
Start: 2022-05-10 | End: 2022-05-10 | Stop reason: HOSPADM

## 2022-05-10 RX ORDER — FLUMAZENIL 0.1 MG/ML
0.2 INJECTION INTRAVENOUS
Status: DISCONTINUED | OUTPATIENT
Start: 2022-05-10 | End: 2022-05-10 | Stop reason: HOSPADM

## 2022-05-10 RX ORDER — FENTANYL CITRATE 50 UG/ML
INJECTION, SOLUTION INTRAMUSCULAR; INTRAVENOUS AS NEEDED
Status: DISCONTINUED | OUTPATIENT
Start: 2022-05-10 | End: 2022-05-10 | Stop reason: HOSPADM

## 2022-05-10 RX ORDER — CEFAZOLIN SODIUM/WATER 2 G/20 ML
2 SYRINGE (ML) INTRAVENOUS
Status: COMPLETED | OUTPATIENT
Start: 2022-05-10 | End: 2022-05-10

## 2022-05-10 RX ORDER — HYDROMORPHONE HYDROCHLORIDE 2 MG/ML
0.5 INJECTION, SOLUTION INTRAMUSCULAR; INTRAVENOUS; SUBCUTANEOUS
Status: DISCONTINUED | OUTPATIENT
Start: 2022-05-10 | End: 2022-05-10 | Stop reason: HOSPADM

## 2022-05-10 RX ORDER — SODIUM CHLORIDE 0.9 % (FLUSH) 0.9 %
5-40 SYRINGE (ML) INJECTION AS NEEDED
Status: DISCONTINUED | OUTPATIENT
Start: 2022-05-10 | End: 2022-05-10 | Stop reason: HOSPADM

## 2022-05-10 RX ADMIN — FENTANYL CITRATE 25 MCG: 50 INJECTION INTRAMUSCULAR; INTRAVENOUS at 14:00

## 2022-05-10 RX ADMIN — PHENYLEPHRINE HYDROCHLORIDE 100 MCG: 10 INJECTION INTRAVENOUS at 14:06

## 2022-05-10 RX ADMIN — Medication 2 G: at 13:45

## 2022-05-10 RX ADMIN — Medication 10 MG: at 13:54

## 2022-05-10 RX ADMIN — SODIUM CHLORIDE, POTASSIUM CHLORIDE, SODIUM LACTATE AND CALCIUM CHLORIDE 100 ML/HR: 600; 310; 30; 20 INJECTION, SOLUTION INTRAVENOUS at 12:52

## 2022-05-10 RX ADMIN — Medication 20 MG: at 14:02

## 2022-05-10 RX ADMIN — PHENYLEPHRINE HYDROCHLORIDE 100 MCG: 10 INJECTION INTRAVENOUS at 14:16

## 2022-05-10 RX ADMIN — PROPOFOL 150 MG: 10 INJECTION, EMULSION INTRAVENOUS at 13:47

## 2022-05-10 RX ADMIN — LIDOCAINE HYDROCHLORIDE 80 MG: 20 INJECTION, SOLUTION EPIDURAL; INFILTRATION; INTRACAUDAL; PERINEURAL at 13:47

## 2022-05-10 NOTE — ANESTHESIA POSTPROCEDURE EVALUATION
Procedure(s):  CYSTOSCOPY WITH RIGHT RETROGRADE PYELOGRAM W/ RIGHT URETERAL STENT EXCHANGE.     general    Anesthesia Post Evaluation      Multimodal analgesia: multimodal analgesia used between 6 hours prior to anesthesia start to PACU discharge  Patient location during evaluation: PACU  Patient participation: complete - patient participated  Level of consciousness: awake and alert  Pain management: adequate  Airway patency: patent  Anesthetic complications: no  Cardiovascular status: acceptable and hemodynamically stable  Respiratory status: acceptable  Hydration status: acceptable  Post anesthesia nausea and vomiting:  controlled  Final Post Anesthesia Temperature Assessment:  Normothermia (36.0-37.5 degrees C)      INITIAL Post-op Vital signs:   Vitals Value Taken Time   /70 05/10/22 1502   Temp 36.7 °C (98 °F) 05/10/22 1502   Pulse 60 05/10/22 1502   Resp 16 05/10/22 1502   SpO2 98 % 05/10/22 1502

## 2022-05-10 NOTE — ANESTHESIA PREPROCEDURE EVALUATION
Relevant Problems   NEUROLOGY   (+) Major depression      RENAL FAILURE   (+) REBEKAH (acute kidney injury) ([facility] [address]   (+) Bilateral hydronephrosis      ENDOCRINE   (+) Hypothyroid      HEMATOLOGY   (+) Acute blood loss anemia   (+) Anemia      PERSONAL HX & FAMILY HX OF CANCER   (+) Rectal cancer (HCC)       Anesthetic History   No history of anesthetic complications            Review of Systems / Medical History  Patient summary reviewed and pertinent labs reviewed    Pulmonary          Smoker (Former)         Neuro/Psych         Psychiatric history (Anxiety and depression)     Cardiovascular                  Exercise tolerance: >4 METS  Comments: Denies CP, SOB or changes in functional status   GI/Hepatic/Renal               Comments: Extensive GI history  LAR syndrome Endo/Other      Hypothyroidism: well controlled  Arthritis     Other Findings   Comments: Rectal ca  Hx/o DVT           Physical Exam    Airway  Mallampati: II  TM Distance: 4 - 6 cm  Neck ROM: normal range of motion   Mouth opening: Normal     Cardiovascular    Rhythm: regular  Rate: normal         Dental    Dentition: Caps/crowns     Pulmonary  Breath sounds clear to auscultation               Abdominal  GI exam deferred       Other Findings            Anesthetic Plan    ASA: 3  Anesthesia type: general          Induction: Intravenous  Anesthetic plan and risks discussed with: Patient    Friend also at bedside  LMA

## 2022-05-10 NOTE — DISCHARGE INSTRUCTIONS
Ureteral Stent Placement: What to Expect at [address] [place] [address]  A ureteral (say \"you-REE-ter-ul\") stent is a thin, hollow tube that is placed in the ureter to help urine pass from the kidney into the bladder. Ureters are the tubes that connect the kidneys to the bladder. You may have a small amount of blood in your urine for 1 to 3 days after the procedure. While the stent is in place, you may have to urinate more often, feel a sudden need to urinate, or feel like you cannot completely empty your bladder. You may feel some pain when you urinate or do strenuous activity. You also may notice a small amount of blood in your urine after strenuous activities. These side effects usually do not prevent people from doing their normal daily activities. You may have a string attached to your stent. Do not to pull the string unless the doctor tells you to. The doctor will use the string to pull out the stent when you no longer need it. After the procedure, urine may flow better from your kidneys to your bladder. A ureteral stent may be left in place for several days or for as long as several months. Your doctor will take it out when you no longer need it. Cystoscopy: What to Expect at [address] [facility]  A cystoscopy is a procedure that lets a doctor look inside of the bladder and the urethra. The urethra is the tube that carries urine from the bladder to outside the body. The doctor uses a thin, lighted tube called a cystoscope to look for kidney or bladder stones, tumors, bleeding, or infection. After the cystoscopy, your urethra may be sore at first, and it may burn when you urinate for the first few days after the procedure. You may feel the need to urinate more often, and your urine may be pink. These symptoms should get better in 1 or 2 days. You will probably be able to go back to work or most of your usual activities in 1 or 2 days. How can you care for yourself at home?   Activity  · Rest when you feel tired. Getting enough sleep will help you recover. · Try to walk each day. Start by walking a little more than you did the day before. Bit by bit, increase the amount you walk. Walking boosts blood flow and helps prevent pneumonia and constipation. · Avoid strenuous activities, such as bicycle riding, jogging, weight lifting, or aerobic exercise, until your doctor says it is okay. · Ask your doctor when you can drive again. · Most people are able to return to work within 1 or 2 days after the procedure. · You may shower and take baths as usual.  · Ask your doctor when it is okay for you to have sex. Diet  · You can eat your normal diet. If your stomach is upset, try bland, low-fat foods like plain rice, broiled chicken, toast, and yogurt. · Drink plenty of fluids (unless your doctor tells you not to). Medicines  · Take pain medicines exactly as directed. ¨ If the doctor gave you a prescription medicine for pain, take it as prescribed. ¨ If you are not taking a prescription pain medicine, ask your doctor if you can take an over-the-counter medicine. · If you think your pain medicine is making you sick to your stomach:  ¨ Take your medicine after meals (unless your doctor has told you not to). ¨ Ask your doctor for a different pain medicine. · If your doctor prescribed antibiotics, take them as directed. Do not stop taking them just because you feel better. You need to take the full course of antibiotics. Medication Interaction:  During your procedure you potentially received a medication or medications which may reduce the effectiveness of oral contraceptives. Please consider other forms of contraception for 1 month following your procedure if you are currently using oral contraceptives as your primary form of birth control. In addition to this, we recommend continuing your oral contraceptive as prescribed, unless otherwise instructed by your physician, during this time.     Follow-up care is a key part of your treatment and safety. Be sure to make and go to all appointments, and call your doctor if you are having problems. It's also a good idea to know your test results and keep a list of the medicines you take. When should you call for help? Call 911 anytime you think you may need emergency care. For example, call if:  · You passed out (lost consciousness). · You have severe trouble breathing. · You have sudden chest pain and shortness of breath, or you cough up blood. · You have severe belly pain. Call your doctor now or seek immediate medical care if:  · You are sick to your stomach or cannot keep fluids down. · Your urine is still red or you see blood clots after you have urinated several times. · You have trouble passing urine or stool, especially if you have pain or swelling in your lower belly. · You have signs of a blood clot, such as:  ¨ Pain in your calf, back of the knee, thigh, or groin. ¨ Redness and swelling in your leg or groin. · You develop a fever or severe chills. · You have pain in your back just below your rib cage. This is called flank pain. Watch closely for changes in your health, and be sure to contact your doctor if:  · A burning feeling is normal for a day or two after the test, but call if it does not get better. · You have a frequent urge to urinate but can pass only small amounts of urine. · It is normal for the urine to have a pinkish color for a few days after the test, but call if it does not get better or if your urine is cloudy, smells bad, or has pus in it.     After general anesthesia or intravenous sedation, for 24 hours or while taking prescription Narcotics:  · Limit your activities  · A responsible adult needs to be with you for the next 24 hours  · Do not drive and operate hazardous machinery  · Do not make important personal or business decisions  · Do not drink alcoholic beverages  · If you have not urinated within 8 hours after discharge, and you are experiencing discomfort from urinary retention, please go to the nearest ED. · If you have sleep apnea and have a CPAP machine, please use it for all naps and sleeping. · Please use caution when taking narcotics and any of your home medications that may cause drowsiness. *  Please give a list of your current medications to your Primary Care Provider. *  Please update this list whenever your medications are discontinued, doses are      changed, or new medications (including over-the-counter products) are added. *  Please carry medication information at all times in case of emergency situations. These are general instructions for a healthy lifestyle:  No smoking/ No tobacco products/ Avoid exposure to second hand smoke  Surgeon General's Warning:  Quitting smoking now greatly reduces serious risk to your health. Obesity, smoking, and sedentary lifestyle greatly increases your risk for illness  A healthy diet, regular physical exercise & weight monitoring are important for maintaining a healthy lifestyle    You may be retaining fluid if you have a history of heart failure or if you experience any of the following symptoms:  Weight gain of 3 pounds or more overnight or 5 pounds in a week, increased swelling in our hands or feet or shortness of breath while lying flat in bed. Please call your doctor as soon as you notice any of these symptoms; do not wait until your next office visit.

## 2022-05-10 NOTE — BRIEF OP NOTE
Brief Postoperative Note    Patient: Rocky Marroquin  YOB: 1945  MRN: [id number]    Date of Procedure: 5/10/2022     Pre-Op Diagnosis: Hydronephrosis, unspecified hydronephrosis type [N13.30]    Post-Op Diagnosis: Same as preoperative diagnosis. Procedure(s):  CYSTOSCOPY WITH RIGHT RETROGRADE PYELOGRAM W/ RIGHT URETERAL STENT EXCHANGE, Intra-operative interpretation of fluoroscopy < 1 hour    Surgeon(s): Radha Wood MD    Surgical Assistant: None    Anesthesia: General     Estimated Blood Loss (mL): Minimal    Complications: None    Specimens: * No specimens in log *     Implants:   Implant Name Type Inv. Item Serial No.  Lot No. LRB No. Used Action   STENT URET 8F 26CM -- PERCUFLEX PLUS [id number] - [id number]  STENT URET 8F 26CM -- [address] [id number] 1 Implanted       Drains:   [REMOVED] Xavier-Scheafer Drain 05/11/21 Right;Posterior Buttocks (Removed)       [REMOVED] Xavier-Schaefer Drain 07/08/21 Right Back (Removed)       [REMOVED] Xavier-Schaefer Drain 08/05/21 Right; Lower Back (Removed)       [REMOVED] Xavier-Schaefer Drain 08/27/21 Anterior;Right Leg (Removed)       [REMOVED] Xavier-Schaefer Drain Right Leg (Removed)       [REMOVED] Nephrostomy Tube 09/16/21 Flank (Removed)       [REMOVED] Nephrostomy Tube 09/16/21 Flank (Removed)       [REMOVED] Nephrostomy Tube Flank (Removed)       [REMOVED] Nephrostomy Tube Flank (Removed)       [REMOVED] Nephrostomy Tube Flank (Removed)       [REMOVED] Nephrostomy Tube 10/28/21 (Removed)       [REMOVED] Nephrostomy Tube 10/28/21 (Removed)       [REMOVED] Drain 8 Fr presacral abscess drain 05/13/21 Posterior;Right Hip (Removed)       [REMOVED] Drain 10Fr right lower posterior Back 05/27/21 Posterior;Right Hip (Removed)       [REMOVED] Drain 07/08/21 Right; Outer; Lower  (Removed)       [REMOVED] Joon Leungstephania #1 08/27/21 Right Abdomen (Removed)       Findings: R ureteral stent exchange.   Fistula seen at posterior bladder wall with mucous. Intra-operative Interpretation of Fluoroscopy < 1 hour:   Right Retrograde Pyelogram: Right hydro-ureteronephrosis down to distal R ureter concerning for persistent extrinsic ureteral compression.      Electronically Signed by Hermilo Michaud MD on 5/10/2022 at 2:25 PM

## 2022-05-10 NOTE — OP NOTES
[address] [place] [address]  OPERATIVE REPORT    Name:  Srinath Lerma  MR#:  [id number]  :  1945  ACCOUNT #:  [de-identified]  DATE OF SERVICE:  05/10/2022    PREOPERATIVE DIAGNOSIS:  Right hydroureteronephrosis. POSTOPERATIVE DIAGNOSIS:  Right hydroureteronephrosis. PROCEDURES PERFORMED:  Cystoscopy, right retrograde pyelogram, right ureteral stent exchange, intraoperative interpretation of fluoroscopy less one hour. SURGEON:  Hossein Correia MD    ASSISTANT:  None. ANESTHESIA:  General.    COMPLICATIONS:  None. SPECIMENS REMOVED:  None. IMPLANTS:  8 x 26 double-J right ureteral stent. ESTIMATED BLOOD LOSS:  Minimal.    DRAINS:  Bishop catheter 16-Kazakh with 10 mL sterile water in balloon. FINDINGS:  Right ureteral stent exchange, fistula seen at the posterior bladder wall containing mucus. INTRAOPERATIVE INTERPRETATION OF FLUOROSCOPY LESS THAN ONE HOUR:  Right retrograde pyelogram showed right hydroureteronephrosis down to the distal right ureter concerning for persistent extrinsic ureteral compression. INDICATIONS FOR OPERATIVE PROCEDURE:  The patient is a 64-year-old gentleman with a very complex past urologic history including kidney stones, pelvic abscess, urinary retention, bladder injury who is currently a Bishop catheter-dependent and has a right ureteral stent due to extrinsic compression of his distal right ureter. He presents today for stent exchange versus removal, retrograde pyelogram and Bishop catheter exchange. PROCEDURE:  After informed consent was obtained, the correct patient was identified in the preoperative holding area. He was taken back to the operating room suite and placed on the table in the supine position. Time-out was performed confirming the correct patient and planned procedure. He received 2 g IV Ancef prior to the smooth induction of general endotracheal anesthesia.   He was moved into dorsal lithotomy position, prepped and draped in the usual sterile fashion. I began the case by inserting a 22-Guinean rigid cystoscope with a 30-degree lens in the urethra and advanced into the patient's bladder. 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 The ureteral orifices were in the orthotopic positions bilaterally. He had a stent extruding from the right ureteral orifice. It was not encrusted. I inserted a sensor wire alongside the stent and advanced it under fluoroscopic guidance into the right renal pelvis. Once the wire was in position, I backloaded the scope off the wire, reinserted the scope with a flexible stent grasper. I grasped the end of the stent and brought it out to the urethral meatus. Once the stent was removed, the wire was left in place. I then reinserted the scope with a 5-Guinean open-ended ureteral catheter. I inserted this into the distal right ureter and injected a 50:50 mixture of 10 mL Conray and sterile water to perform a retrograde pyelogram.  This showed hydroureteronephrosis down to the distal ureter consistent with known extrinsic compression. I then removed the ureteral catheter and decided to replace the stent given the persistent hydroureteronephrosis seen on retrograde. I loaded the wire onto the cystoscope and then passed an 8 x 26 double-J right ureteral stent without strings over the wire under fluoroscopic guidance into the right renal pelvis. Once the stent was in position, I pulled the wire noting a good curl in the renal pelvis as well as the bladder. I then drained the bladder and removed the cystoscope. I placed a 16-Guinean Bishop catheter with 10 mL sterile water in the balloon and connected it to drainage. He was then awoken from anesthesia, transferred to the PACU in stable condition. He tolerated the procedure well. There were no complications. All counts were correct at the end of the procedure.   He will return in one month for catheter exchange and then he will need a stent change in six months. My office will call him to set that up.       Luciano Jorgensen MD      [id number]  D:  05/10/2022 14:40  T:  05/10/2022 17:17  JOB #:  [id number]

## 2022-05-11 ENCOUNTER — HOSPITAL ENCOUNTER (OUTPATIENT)
Dept: GENERAL RADIOLOGY | Age: 77
Discharge: HOME | End: 2022-05-11
Attending: UROLOGY

## 2022-05-20 ENCOUNTER — TELEPHONE (OUTPATIENT)
Dept: UROLOGY | Age: 77
End: 2022-05-20

## 2022-05-20 PROBLEM — K51.40 PSEUDOPOLYPOSIS OF COLON WITHOUT COMPLICATION, UNSPECIFIED PART OF COLON (HCC): Status: ACTIVE | Noted: 2022-05-20

## 2022-05-20 PROBLEM — E43 SEVERE PROTEIN-CALORIE MALNUTRITION (HCC): Status: RESOLVED | Noted: 2019-05-19 | Resolved: 2022-05-20

## 2022-05-20 PROBLEM — E44.0 MODERATE PROTEIN-CALORIE MALNUTRITION (HCC): Status: RESOLVED | Noted: 2019-05-19 | Resolved: 2022-05-20

## 2022-05-20 NOTE — TELEPHONE ENCOUNTER
----- Message from Suleman Russell MD sent at 5/10/2022  2:32 PM EDT -----  Regarding: Surgery Scheduling - For December 2022  Hospital: [facility]      Surgeon: Hailey Elias     Assist: NONE     Diagnosis: Right Hydronephrosis     Procedure: Cystoscopy, Right Retrograde Pyelogram, Right Ureteral Stent Exchange vs. Removal     Posting time: 30 minutes      Special Instruments Needed:  NONE     Anesthesia: GENERAL     Labs: None     Tests: EKG per anesthesia     Blood: NONE     Bowel Prep: NONE     Special Instructions: Schedule in December 2022.   Needs pre-op H&P appointment with me 1-2 weeks prior to surgery date.     Orders/HandP:      Follow up appointment: SILVIA

## 2022-06-02 ENCOUNTER — HOSPITAL ENCOUNTER (OUTPATIENT)
Dept: PHYSICAL THERAPY | Age: 77
Setting detail: RECURRING SERIES
Discharge: HOME | End: 2022-06-05
Payer: MEDICARE

## 2022-06-02 PROCEDURE — 97110 THERAPEUTIC EXERCISES: CPT

## 2022-06-02 PROCEDURE — 97162 PT EVAL MOD COMPLEX 30 MIN: CPT

## 2022-06-02 NOTE — PROGRESS NOTES
Victorina Nolasco  : 1945  Primary: Medicare Part A And B  Secondary: [address] [place]  [address] [facility] [address]  Phone: [phone]  Fax: [phone] Plan Frequency: 2 times a week for 12 weeks    Plan of Care/Certification Expiration Date: 22      PT Visit Info:   No data recorded    OUTPATIENT PHYSICAL THERAPY:OP NOTE TYPE: Treatment Note 2022       Episode  }Appt Desk              Treatment Diagnosis:  Generalized Muscle Weakness (M62.81)  Difficulty in walking, Not elsewhere classified (R26.2)  Other abnormalities of gait and mobility (R26.89)  Medical/Referring Diagnosis:  Balance problems [R26.89]  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 Restrictions/Precautions:  Restrictions/Precautions: None  Required Braces or Orthoses?: No  No data recorded   Interventions Planned (Treatment may consist of any combination of the following):    Current Treatment Recommendations: Strengthening; ROM; Balance training; Functional mobility training; Transfer training; Endurance training; Gait training; Stair training; Home exercise program; Therapeutic activities     Subjective Comments:  See today's initial evaluation report. Initial:None}     /10Post Session:NONE        /10  Medications Last Reviewed:  2022  Updated Objective Findings:  See evaluation note from today   Observation  Posture: pt stands with hips/knees slightly flexed, hips externally rotated  TUG Test: 16.3 seconds  5x sit -stand: 17.1 seconds  Bed mobility: able to half-range bridge, side scoot L/R without assistance. Unable to R side SLR without assistance - has poor R hip flexor muscle contractions during SLR.     ROM (R/L in °) AROM(PROM)  Hip flexion (115)/(110)  Hip extension  0/0  Hip ER  (38)/(40)  Hip IR   (22)/(20)    Knee extension  (-9)/(-4)  Knee flexion   (130)/(135)    Ankle DF   (10)/(10)  Ankle PF   WNL/WNL    Strength (R/L)   Hip flexion  3+/3-  Hip ABD  3-/3+  Hip extension  4-/4-  Hip ER  4-/4-  Hip IR   3-/4-    Knee extension  11.4kg/13kg  Knee flexion   10.5kg/10kg    Ankle DF   4+/4+  Ankle PF   4-/4-  Ankle eversion  4/4+  Ankle inversion  4/4    Treatment      TREATMENT:   THERAPEUTIC ACTIVITY: ( see below for minutes): Therapeutic activities per grid below to improve mobility, strength, balance and coordination. Required minimal visual, verbal, manual and tactile cues to improve independence and safety with daily activities . THERAPEUTIC EXERCISE: (see below for minutes): Exercises per grid below to improve mobility, strength, balance and coordination. Required minimal verbal and manual cues to promote proper body alignment, promote proper body posture and promote proper body mechanics. Progressed resistance, range, repetitions and complexity of movement as indicated. MANUAL THERAPY: (see below for minutes): Joint mobilization and Soft tissue mobilization was utilized and necessary because of the patient's restricted joint motion, painful spasm, loss of articular motion and restricted motion of soft tissue. MODALITIES: (see below for minutes): to decrease pain   SELF CARE: (see below for minutes): Procedure(s) (per grid) utilized to improve and/or restore self-care/home management as related to dressing, bathing and grooming. Required minimal verbal cueing to facilitate activities of daily living skills and compensatory activities    Goals: (Goals have been discussed and agreed upon with patient.)  Short-Term Functional Goals: Time Frame: 6 weeks  1. Independent performance of home exercise program  2. Pt to demonstrate improved TUG test = 13 seconds  3.  Improved capacity to perform bed mobility with ability to right side SLR and leg cross without assist of upper extremities  Discharge Goals: Time Frame: 12 weeks  1. Improve TUG test to 11 seconds or better  2. Improve 5x/sit-stand to 12 seconds or better  3. Improve LE strengths (quads/hams) 25% or better to allow strong stride for irregular surface walking without fall risk. Date: 6/2/22  (visit 1)       Modalities:                                Therapeutic Exercise: 25 min        LTR's w/ legs over  Physioball, assisted, 3 min        SB hamstring curls x 20        Bridges x 20        Hip logrolling AROM: 20 x each L/R        R SLR's assisted: 3 x 5x        LAQ's: 6# , L/R, single set to fatigue: 17x each                                                       Proprioceptive Activities:                                Manual Therapy:                        Functional Activities:                                            Treatment/Session Summary:    · Treatment Assessment:  Good response to initial treatment, with pt demonstrating ready participation in tratment program.  · Communication/Consultation:  None today  · Equipment provided today:  None  · Recommendations/Intent for next treatment session: Next visit will focus on LE strength with particular focus on R hip flexion/adduction weakness, hip and trunk mobility training, bed mobility activities, sit-stand transfers and step training, narrow base balance, stride length activities.  Home exercise program.    Total Treatment Billable Duration:  51 minutes   Time In: 0809  Time Out: 0900    Dolores Landa PT       Charge Capture  }Post Session Pain  MedBridge Portal  MD Guidelines  Scanned Media  Benefits  MyChart    Future Appointments   Date Time Provider Dot Gusman   6/6/2022  8:45 AM Dolores Landa PT [facility]   6/8/2022  8:45 AM Dolores Landa PT [facility]   6/10/2022  9:00 AM [id number] INJECTION/CATH [id number] [facility] AMB   6/13/2022  9:15 AM Miguel Ángel Green MD [id number] [facility] AMB   6/13/2022 10:15 AM Dolores Landa PT [facility] O   6/15/2022  8:45 AM Freida Griffin, PT [facility]   6/20/2022  8:45 AM Freida Griffin, PT [facility]   6/22/2022  8:45 AM Freida Griffin, PT [facility]   6/27/2022  8:45 [facility]   6/29/2022  8:45 AM Freida Griffin, PT [facility]   12/2/2022  8:15 AM TRIM LAB RESOURCE TRIM [facility] AMB   12/9/2022  8:30 AM Tony Andrade DO TRIM [facility] AMB

## 2022-06-02 NOTE — THERAPY EVALUATION
Lanny Nolasco  : 1945  Primary: Medicare Part A And B  Secondary: [address] [place]  [address] [facility] [address]  Phone: [phone]  Fax: [phone] Plan Frequency: 2 times a week for 12 weeks    Plan of Care/Certification Expiration Date: 22      PT Visit Info:    No data recorded    OUTPATIENT PHYSICAL THERAPY:OP NOTE TYPE: Initial Assessment 2022               Episode  Appt Desk         Treatment Diagnosis:  Generalized Muscle Weakness (M62.81)  Difficulty in walking, Not elsewhere classified (R26.2)  Other abnormalities of gait and mobility (R26.89)  * No diagnoses found *  Medical/Referring Diagnosis:  Balance problems [R26.89]  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 Restrictions/Precautions:    Restrictions/Precautions: None  Required Braces or Orthoses?: No  No data recorded   Medications Last Reviewed:  2022     SUBJECTIVE   History of Injury/Illness (Reason for Referral):  Pt comes to PT today with a history of lower extremity weakness, difficulty walking - all related to his complex medical history that includes proctectomy, perineal and abdominal approach with right gracilis muscle flap and Repair of cystotomy 21. He noted difficulty with walking with a normal stride, states that he's able to walk at slow speeds but with no power in his stride and stiffness in his hips. He has residual right hip weakness related to the muscle graft used to repair the proctectomy, states that he's not able to straight leg raise his right hip without assistance. His limitations in hip mobility and strength also hinder his ability to cross his right leg over his left knee to put on socks/shoes.  He tries to walk for fitness 3 times a day (~ 15 minutes) each. He denies any history of falling and does not use an assistive device for gait. Patient Stated Goal(s):  \"Walk better/stronger with normal stride\"  Initial:none      /10 Post Session: none      /10  Past Medical History/Comorbidities:   Mr. Yosvany Dinh  has a past medical history of Acute deep vein thrombosis (DVT) of non-extremity vein, Family history of malignant neoplasm of gastrointestinal tract, Fecal incontinence, John catheter in place, Former cigarette smoker, History of rectal cancer, Hypothyroid, Infection and inflammatory reaction due to other internal prosthetic devices, implants and grafts, subsequent encounter, Insomnia, Major depression, Osteoarthritis, hand, Personal history of colonic polyps, Personal history of malignant neoplasm of rectum, rectosigmoid junction, and anus, and Psychiatric disorder. Mr. Yosvany Dinh  has a past surgical history that includes IR GUIDED NEPHROSTOGRAM/URETEROGRAM EXISTING ACCESS (12/10/2021); total colectomy (11/2013); IR GUIDED NEPHROSTOMY CATH EXCHANGE (12/7/2021); IR ABSCESS EVAL THRU EXISTING CATH (11/24/2021); IR GUIDED NEPHROSTOMY CATH EXCHANGE (11/3/2021); IR GUIDED NEPHROSTOMY CATH EXCHANGE (10/28/2021); IR GUIDED NEPHROSTOGRAM/URETEROGRAM EXISTING ACCESS (10/4/2021); IR TUBE/CATH CHANGE W CONTRAST (8/5/2021); IR GUIDED NEPHROSTOMY CATH PLACEMENT LEFT (9/16/2021); Colonoscopy (last 2/3/15); vascular surgery (Left, 4/14); hernia repair; hernia repair (3/2015, 5/2016); total colectomy (Right, 8/2014); IR TUBE/CATH CHANGE W CONTRAST (7/8/2021); IR TUBE/CATH CHANGE W CONTRAST (6/14/2021); IR ABSCESS EVAL THRU EXISTING CATH (6/4/2021); IR TUBE/CATH CHANGE W CONTRAST (5/27/2021); IR ABSCESS EVAL THRU EXISTING CATH (5/20/2021); Colonoscopy (N/A, 3/5/2021); colonoscopy thru stoma,lesn rmvl w/snare (3/5/2021); IR REPLACE TUNNELED CVC WO SQ PORT/PUMP SAME ACCESS (5/30/2019);  IR TUNNELED CVC PLACE WO SQ PORT/PUMP > 5 YEARS (5/20/2019); Colonoscopy (N/A, 2/12/2018); colonoscopy thru stoma (2/12/2018); other surgical history (Bilateral); polypectomy; other surgical history (10/7/2013); gi (4/2016); colostomy (5/11/16); CT PERITONEAL/RETROPERITONEAL PERC DRAIN (5/13/2021); IR TUNNELED CVC PLACE WO SQ PORT/PUMP > 5 YEARS (5/20/2019); CT PERITONEAL/RETROPERITONEAL PERC DRAIN (5/20/2021); and IR GUIDED NEPHROSTOMY CATH PLACEMENT LEFT (9/16/2021). Social History/Living Environment:   Lives With: Alone  Type of Home: House  Home Layout: Two level     Prior Level of Function/Work/Activity:   Prior level of function: Independent  Occupation: Retired  Ambulation Assistance: Independent  Transfer Assistance: Independent  Active : Yes  No data recorded   Learning:   Does the patient/guardian have any barriers to learning?: No barriers  Will there be a co-learner?: No  What is the preferred language of the patient/guardian?: English  Is an  required?: No  How does the patient/guardian prefer to learn new concepts?: Listening; Reading; Demonstration; Pictures/Videos     Fall Risk Scale: Total Score: 0  Powers Fall Risk: Low (0-24)     Dominant Side:  right handed        OBJECTIVE       Observation  Posture: pt stands with hips/knees slightly flexed, hips externally rotated  TUG Test: 16.3 seconds  5x sit -stand: 17.1 seconds  Bed mobility: able to half-range bridge, side scoot L/R without assistance. Unable to R side SLR without assistance - has poor R hip flexor muscle contractions during SLR.     ROM (R/L in °) AROM(PROM)  Hip flexion  (115)/(110)  Hip extension  0/0  Hip ER  (38)/(40)  Hip IR   (22)/(20)    Knee extension  (-9)/(-4)  Knee flexion   (130)/(135)    Ankle DF   (10)/(10)  Ankle PF   WNL/WNL    Strength (R/L)   Hip flexion  3+/3-  Hip ABD  3-/3+  Hip extension  4-/4-  Hip ER  4-/4-  Hip IR   3-/4-    Knee extension  11.4kg/13kg  Knee flexion   10.5kg/10kg    Ankle DF   4+/4+  Ankle PF   4-/4-  Ankle eversion 4/4+  Ankle inversion  4/4    ASSESSMENT   Initial Assessment:  Pt demonstrating limited function with transfers, slow gait speed and difficulty transferring supine-sit and sit-stand. Pt lives alone, has a higher previous functional capacity and needs better gait /transfer function to continue living alone independently. Problem List: (Impacting functional limitations): Body Structures, Functions, Activity Limitations Requiring Skilled Therapeutic Intervention: Decreased functional mobility ; Decreased ADL status; Decreased ROM; Decreased body mechanics; Decreased strength; Decreased endurance; Decreased balance     Therapy Prognosis:   Therapy Prognosis: Good     Assessment Complexity:   Medium Complexity  PLAN   Effective Dates: 6/2/2022 TO Plan of Care/Certification Expiration Date: 08/31/22     Frequency/Duration: Plan Frequency: 2 times a week for 12 weeks     Interventions Planned (Treatment may consist of any combination of the following):    Current Treatment Recommendations: Strengthening; ROM; Balance training; Functional mobility training; Transfer training; Endurance training; Gait training; Stair training; Home exercise program; Therapeutic activities     Goals: (Goals have been discussed and agreed upon with patient.)  Short-Term Functional Goals: Time Frame: 6 weeks  1. Independent performance of home exercise program  2. Pt to demonstrate improved TUG test = 13 seconds  3. Improved capacity to perform bed mobility with ability to right side SLR and leg cross without assist of upper extremities  Discharge Goals: Time Frame: 12 weeks  1. Improve TUG test to 11 seconds or better  2. Improve 5x/sit-stand to 12 seconds or better  3. Improve LE strengths (quads/hams) 25% or better to allow strong stride for irregular surface walking without fall risk. Outcome Measure:    Tool Used: Five Times Sit to Stand (FTSST or 5xSST)   Score:  Initial: 17.1 seconds Most Recent:  seconds (Date: -- ) Interpretation of Score: This is a measure of functional lower limb muscle strength and quantifying functional change of transitional movements. A score of 12 seconds or less indicates a normal level of function for community dwelling older individuals, a score of 15 seconds or more is at risk for fall. Tool Used: Timed Up and Go (TUG)  Score:  Initial: 16.3 seconds Most Recent: X seconds (Date: -- )   Interpretation of Score: The test measures, in seconds, the time taken by an individual to stand up from a standard arm chair (seat height 46 cm [18 in], arm height 65 cm [25.6 in]), walk a distance of 3 meters (118 in, approx 10 ft), turn, walk back to the chair and sit down. If the individual takes longer than 14 seconds to complete TUG, this indicates risk for falls. Medical Necessity:   Patient demonstrates good rehab potential due to higher previous functional level. Skilled intervention continues to be required due to pt just starting PT to address above-noted goals. Reason For Services/Other Comments:  See above noted initial evaluation  Total Duration:  Time In: 0809  Time Out: 0900    Regarding Clara Cockayne Comin's therapy, I certify that the treatment plan above will be carried out by a therapist or under their direction.   Thank you for this referral,  Chelsi Mora, PT     Referring Physician Signature: Neda Chávez DO _______________________________ Date _____________        Post Session Pain  Charge Capture   POC Link  Treatment Note Link  MD Guidelines  MyChart

## 2022-06-06 ENCOUNTER — HOSPITAL ENCOUNTER (OUTPATIENT)
Dept: PHYSICAL THERAPY | Age: 77
Setting detail: RECURRING SERIES
Discharge: HOME | End: 2022-06-09
Payer: MEDICARE

## 2022-06-06 PROCEDURE — 97110 THERAPEUTIC EXERCISES: CPT

## 2022-06-06 NOTE — PROGRESS NOTES
Clara Cockayne Comin  : 1945  Primary: Medicare Part A And B  Secondary: [address] [place]  [address] [address]  Phone: [phone]  Fax: [phone] Plan Frequency: 2 times a week for 12 weeks    Plan of Care/Certification Expiration Date: 22      PT Visit Info:   No data recorded    OUTPATIENT PHYSICAL THERAPY:OP NOTE TYPE: Treatment Note 2022       Episode  }Appt Desk              Treatment Diagnosis:  Generalized Muscle Weakness (M62.81)  Difficulty in walking, Not elsewhere classified (R26.2)  Other abnormalities of gait and mobility (R26.89)  Medical/Referring Diagnosis:  Balance problems [R26.89]  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 Restrictions/Precautions:  Restrictions/Precautions: None  Required Braces or Orthoses?: No  No data recorded   Interventions Planned (Treatment may consist of any combination of the following):    Current Treatment Recommendations: Strengthening; ROM; Balance training; Functional mobility training; Transfer training; Endurance training; Gait training; Stair training; Home exercise program; Therapeutic activities     Subjective Comments: Has been exercising more at home, using his recumbent bicycle with increased resistance at 30 RPM, not experiencing muscle soreness from doing so. Initial:None}     /10Post Session:None        /10  Medications Last Reviewed:  2022  Updated Objective Findings:  See evaluation note from today   Observation  Posture: pt stands with hips/knees slightly flexed, hips externally rotated  TUG Test: 16.3 seconds  5x sit -stand: 17.1 seconds  Bed mobility: able to half-range bridge, side scoot L/R without assistance.  Unable to R side SLR without assistance - has poor R hip flexor muscle contractions during SLR. ROM (R/L in °) AROM(PROM)  Hip flexion  (115)/(110)  Hip extension  0/0  Hip ER  (38)/(40)  Hip IR   (22)/(20)    Knee extension  (-9)/(-4)  Knee flexion   (130)/(135)    Ankle DF   (10)/(10)  Ankle PF   WNL/WNL    Strength (R/L)   Hip flexion  3+/3-  Hip ABD  3-/3+  Hip extension  4-/4-  Hip ER  4-/4-  Hip IR   3-/4-    Knee extension  11.4kg/13kg  Knee flexion   10.5kg/10kg    Ankle DF   4+/4+  Ankle PF   4-/4-  Ankle eversion  4/4+  Ankle inversion  4/4    Treatment      TREATMENT:   THERAPEUTIC ACTIVITY: ( see below for minutes): Therapeutic activities per grid below to improve mobility, strength, balance and coordination. Required minimal visual, verbal, manual and tactile cues to improve independence and safety with daily activities . THERAPEUTIC EXERCISE: (see below for minutes): Exercises per grid below to improve mobility, strength, balance and coordination. Required minimal verbal and manual cues to promote proper body alignment, promote proper body posture and promote proper body mechanics. Progressed resistance, range, repetitions and complexity of movement as indicated. MANUAL THERAPY: (see below for minutes): Joint mobilization and Soft tissue mobilization was utilized and necessary because of the patient's restricted joint motion, painful spasm, loss of articular motion and restricted motion of soft tissue. MODALITIES: (see below for minutes): to decrease pain   SELF CARE: (see below for minutes): Procedure(s) (per grid) utilized to improve and/or restore self-care/home management as related to dressing, bathing and grooming. Required minimal verbal cueing to facilitate activities of daily living skills and compensatory activities    Goals: (Goals have been discussed and agreed upon with patient.)  Short-Term Functional Goals: Time Frame: 6 weeks  1. Independent performance of home exercise program  2.  Pt to demonstrate improved TUG test = 13 seconds  3. Improved capacity to perform bed mobility with ability to right side SLR and leg cross without assist of upper extremities  Discharge Goals: Time Frame: 12 weeks  1. Improve TUG test to 11 seconds or better  2. Improve 5x/sit-stand to 12 seconds or better  3. Improve LE strengths (quads/hams) 25% or better to allow strong stride for irregular surface walking without fall risk. Date: 6/2/22  (visit 1) 6/6/22 (visit 2)      Modalities:                                Therapeutic Exercise: 25 min 45 min       LTR's w/ legs over  Physioball, assisted, 3 min Seated stepper: 5 min, level 1 resist       SB hamstring curls x 20 Lower trunk rotation w/legs over physioball - 1 min assisted 2 min AROM       Bridges x 20 SB hamstring curls - assisted: 30x       Hip logrolling AROM: 20 x each L/R R /L su stretches: 2 min each L/R       R SLR's assisted: 3 x 5x Bilat hip adductor isometrics hooklying: 20 x 5\"       LAQ's: 6# , L/R, single set to fatigue: 17x each R hip adductor bent leg fallout/returns: 4 x 5 reps        R SLR's: 4 x 5        LAQ's: 6# bilat: 20x        Sit-stands (elevated seat) : 4 x 5 reps                              Proprioceptive Activities:                                Manual Therapy:                        Functional Activities:                                            Treatment/Session Summary:    · Treatment Assessment: Less fatigue at end of treatment than expected, will progress exercise activities at next visit into standing/gait related activities to improve stride length/balance and step coordination.       · Communication/Consultation:  None today  · Equipment provided today:  None  · Recommendations/Intent for next treatment session: Next visit will focus on LE strength with particular focus on R hip flexion/adduction weakness, hip and trunk mobility training, bed mobility activities, sit-stand transfers and step training, narrow base balance, stride length activities.  Home exercise program.    Total Treatment Billable Duration:  45 minutes   Time In: 0845  Time Out: 0930    Katarzyna Oliveros, PT       Charge Capture  }Post Session Pain  MedBridge Portal  MD Guidelines  Scanned Media  Benefits  MyChart    Future Appointments   Date Time Provider Dot Conradi   6/8/2022  8:45 AM Katarzyna Oliveros, PT [facility]   6/10/2022  9:00 AM [id number] INJECTION/CATH [id number] [facility] AMB   6/13/2022 10:15 AM Katarzyna Oliveros, PT [facility]   6/15/2022  8:45 [facility]   6/20/2022  8:45 AM [facility]   6/22/2022  8:45 AM [facility]   6/27/2022  8:45 AM Katarzyna Oliveros, PT [facility]   6/27/2022  2:30 PM Elsie Mtz MD [id number] [facility] AMB   6/29/2022  8:45 AM Katarzyna Oliveros, PT [facility]   12/2/2022  8:15 AM TRIM LAB RESOURCE TRIM [facility] AMB   12/9/2022  8:30 AM Nilda Cleveland DO TRIM [facility] AMB

## 2022-06-08 ENCOUNTER — HOSPITAL ENCOUNTER (OUTPATIENT)
Dept: PHYSICAL THERAPY | Age: 77
Setting detail: RECURRING SERIES
Discharge: HOME | End: 2022-06-11
Payer: MEDICARE

## 2022-06-08 PROCEDURE — 97110 THERAPEUTIC EXERCISES: CPT

## 2022-06-08 PROCEDURE — 97530 THERAPEUTIC ACTIVITIES: CPT

## 2022-06-08 NOTE — PROGRESS NOTES
Christy Nolasco  : 1945  Primary: Medicare Part A And B  Secondary: [address] [place]  [address] [facility] [address]  Phone: [phone]  Fax: [phone] Plan Frequency: 2 times a week for 12 weeks    Plan of Care/Certification Expiration Date: 22      PT Visit Info:   No data recorded    OUTPATIENT PHYSICAL THERAPY:OP NOTE TYPE: Treatment Note 2022       Episode  }Appt Desk              Treatment Diagnosis:  Generalized Muscle Weakness (M62.81)  Difficulty in walking, Not elsewhere classified (R26.2)  Other abnormalities of gait and mobility (R26.89)  Medical/Referring Diagnosis:  Balance problems [R26.89]  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 Restrictions/Precautions:  Restrictions/Precautions: None  Required Braces or Orthoses?: No  No data recorded   Interventions Planned (Treatment may consist of any combination of the following):    Current Treatment Recommendations: Strengthening; ROM; Balance training; Functional mobility training; Transfer training; Endurance training; Gait training; Stair training; Home exercise program; Therapeutic activities     Subjective Comments: Not much change in his walking ability noticed yet. Initial:None}     /10Post Session:None        /10  Medications Last Reviewed:  2022  Updated Objective Findings:  See evaluation note from today   Observation  Posture: pt stands with hips/knees slightly flexed, hips externally rotated  TUG Test: 16.3 seconds  5x sit -stand: 17.1 seconds  Bed mobility: able to half-range bridge, side scoot L/R without assistance. Unable to R side SLR without assistance - has poor R hip flexor muscle contractions during SLR.     ROM (R/L in °) AROM(PROM)  Hip flexion  (115)/(110)  Hip extension  0/0  Hip ER  (38)/(40)  Hip IR   (22)/(20)    Knee extension  (-9)/(-4)  Knee flexion   (130)/(135)    Ankle DF   (10)/(10)  Ankle PF   WNL/WNL    Strength (R/L)   Hip flexion  3+/3-  Hip ABD  3-/3+  Hip extension  4-/4-  Hip ER  4-/4-  Hip IR   3-/4-    Knee extension  11.4kg/13kg  Knee flexion   10.5kg/10kg    Ankle DF   4+/4+  Ankle PF   4-/4-  Ankle eversion  4/4+  Ankle inversion  4/4    Treatment      TREATMENT:   THERAPEUTIC ACTIVITY: ( see below for minutes): Therapeutic activities per grid below to improve mobility, strength, balance and coordination. Required minimal visual, verbal, manual and tactile cues to improve independence and safety with daily activities . THERAPEUTIC EXERCISE: (see below for minutes): Exercises per grid below to improve mobility, strength, balance and coordination. Required minimal verbal and manual cues to promote proper body alignment, promote proper body posture and promote proper body mechanics. Progressed resistance, range, repetitions and complexity of movement as indicated. MANUAL THERAPY: (see below for minutes): Joint mobilization and Soft tissue mobilization was utilized and necessary because of the patient's restricted joint motion, painful spasm, loss of articular motion and restricted motion of soft tissue. MODALITIES: (see below for minutes): to decrease pain   SELF CARE: (see below for minutes): Procedure(s) (per grid) utilized to improve and/or restore self-care/home management as related to dressing, bathing and grooming. Required minimal verbal cueing to facilitate activities of daily living skills and compensatory activities    Goals: (Goals have been discussed and agreed upon with patient.)  Short-Term Functional Goals: Time Frame: 6 weeks  1. Independent performance of home exercise program  2. Pt to demonstrate improved TUG test = 13 seconds  3.  Improved capacity to perform bed mobility with ability to right side SLR and leg cross without assist of upper extremities  Discharge Goals: Time Frame: 12 weeks  1. Improve TUG test to 11 seconds or better  2. Improve 5x/sit-stand to 12 seconds or better  3. Improve LE strengths (quads/hams) 25% or better to allow strong stride for irregular surface walking without fall risk. Date: 6/2/22  (visit 1) 6/6/22 (visit 2) 6/8/22 (visit 3)     Modalities:                                Therapeutic Exercise: 25 min 45 min 40 min      LTR's w/ legs over  Physioball, assisted, 3 min Seated stepper: 5 min, level 1 resist Seated stepper: 6 min @ level 5 resist 30 rpm or >      SB hamstring curls x 20 Lower trunk rotation w/legs over physioball - 1 min assisted 2 min AROM LAQ's w/ 10#: 2 x 15 each L/R      Bridges x 20 SB hamstring curls - assisted: 30x R SLR's w/ leg off table: 3 x 10 , assisted      Hip logrolling AROM: 20 x each L/R R /L su stretches: 2 min each L/R Supine manl.  Resist R hip logrolling to IR: 10x, f/b isometric 10\" holds x 3      R SLR's assisted: 3 x 5x Bilat hip adductor isometrics hooklying: 20 x 5\" Bridges w/ legs over bolster: 15/f/b 15 w/10# on abdomen      LAQ's: 6# , L/R, single set to fatigue: 17x each R hip adductor bent leg fallout/returns: 4 x 5 reps Hooklying  R hip bent leg fallouts- returns: 2 x 10       R SLR's: 4 x 5        LAQ's: 6# bilat: 20x        Sit-stands (elevated seat) : 4 x 5 reps                              Proprioceptive Activities:                                Manual Therapy:                        Functional Activities:   15 min        Forward step ups: 6\" 2 x 10 each L/R        Standing in // bars: stride forward/back (hands on bars): 2 x 10 each L/R        Long stride walking in // bars: 5 laps w/ 2 hands, 3 laps w/ R hand only                  Treatment/Session Summary:    · Treatment Assessment:  Progressed into stride activities and step ups to both lengthen step length and improve balance while doing so -pt able to do these activities with at least one hand support but will need more work on keeping trunk upright while doing so. · Communication/Consultation:  None today  · Equipment provided today:  None  · Recommendations/Intent for next treatment session: Next visit will focus on LE strength with particular focus on R hip flexion/adduction weakness, hip and trunk mobility training, bed mobility activities, sit-stand transfers and step training, narrow base balance, stride length activities.  Home exercise program.    Total Treatment Billable Duration:  55 minutes   Time In: 5334  Time Out: 0945    Elvia Cones, PT       Charge Capture  }Post Session Pain  MedBridge Portal  MD Guidelines  Scanned Media  Benefits  MyChart    Future Appointments   Date Time Provider Dot Gusman   6/10/2022  9:00 AM [id number] INJECTION/CATH [id number] [facility] AMB   6/13/2022 10:15 AM Elvia Cones, PT [facility] O   6/15/2022  8:45 [facility]   6/20/2022  8:45 AM [facility]   6/22/2022  8:45 AM [facility]   6/27/2022  8:45 AM Elvia Cones, PT [facility] O   6/27/2022  2:30 PM Ahsan Rodriguez MD [id number] [facility] AMB   6/29/2022  8:45 AM Elvia Cones, PT [facility]   12/2/2022  8:15 AM TRIM LAB RESOURCE TRIM [facility] AMB   12/9/2022  8:30 AM Shawn Scott DO TRIM [facility] AMB

## 2022-06-10 ENCOUNTER — NURSE ONLY (OUTPATIENT)
Dept: UROLOGY | Age: 77
End: 2022-06-10
Payer: MEDICARE

## 2022-06-10 DIAGNOSIS — Z46.6 URINARY CATHETER CHANGE REQUIRED: ICD-10-CM

## 2022-06-10 DIAGNOSIS — R33.9 URINARY RETENTION: Primary | ICD-10-CM

## 2022-06-10 PROCEDURE — 51702 INSERT TEMP BLADDER CATH: CPT | Performed by: UROLOGY

## 2022-06-10 NOTE — PROGRESS NOTES
Pt came in for q 4 weeks catheter change. 20f coude carr catheter changed without incident.  Patient tolerated procedure well

## 2022-06-13 ENCOUNTER — HOSPITAL ENCOUNTER (OUTPATIENT)
Dept: PHYSICAL THERAPY | Age: 77
Setting detail: RECURRING SERIES
Discharge: HOME | End: 2022-06-16
Payer: MEDICARE

## 2022-06-13 PROCEDURE — 97530 THERAPEUTIC ACTIVITIES: CPT

## 2022-06-13 PROCEDURE — 97110 THERAPEUTIC EXERCISES: CPT

## 2022-06-13 NOTE — PROGRESS NOTES
Angeli Nolasco  : 1945  Primary: Medicare Part A And B  Secondary: [address] [place]  [address] [facility] [address]  Phone: [phone]  Fax: [phone] Plan Frequency: 2 times a week for 12 weeks    Plan of Care/Certification Expiration Date: 22      PT Visit Info:   No data recorded    OUTPATIENT PHYSICAL THERAPY:OP NOTE TYPE: Treatment Note 2022       Episode  }Appt Desk              Treatment Diagnosis:  Generalized Muscle Weakness (M62.81)  Difficulty in walking, Not elsewhere classified (R26.2)  Other abnormalities of gait and mobility (R26.89)  Medical/Referring Diagnosis:  Balance problems [R26.89]  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 Restrictions/Precautions:  Restrictions/Precautions: None  Required Braces or Orthoses?: No  No data recorded   Interventions Planned (Treatment may consist of any combination of the following):    Current Treatment Recommendations: Strengthening; ROM; Balance training; Functional mobility training; Transfer training; Endurance training; Gait training; Stair training; Home exercise program; Therapeutic activities     Subjective Comments: Has used his elliptical and exercise bike since last visit. Feels like his stride is lengthening, but he can't get his R leg to pull inward. Initial:None}     /10Post Session:None        /10  Medications Last Reviewed:  2022  Updated Objective Findings:  See evaluation note from today   Observation  Posture: pt stands with hips/knees slightly flexed, hips externally rotated  TUG Test: 16.3 seconds  5x sit -stand: 17.1 seconds  Bed mobility: able to half-range bridge, side scoot L/R without assistance.  Unable to R side SLR without assistance - has poor R hip flexor muscle contractions during SLR. ROM (R/L in °) AROM(PROM)  Hip flexion  (115)/(110)  Hip extension  0/0  Hip ER  (38)/(40)  Hip IR   (22)/(20)    Knee extension  (-9)/(-4)  Knee flexion   (130)/(135)    Ankle DF   (10)/(10)  Ankle PF   WNL/WNL    Strength (R/L)   Hip flexion  3+/3-  Hip ABD  3-/3+  Hip extension  4-/4-  Hip ER  4-/4-  Hip IR   3-/4-    Knee extension  11.4kg/13kg  Knee flexion   10.5kg/10kg    Ankle DF   4+/4+  Ankle PF   4-/4-  Ankle eversion  4/4+  Ankle inversion  4/4    Treatment      TREATMENT:   THERAPEUTIC ACTIVITY: ( see below for minutes): Therapeutic activities per grid below to improve mobility, strength, balance and coordination. Required minimal visual, verbal, manual and tactile cues to improve independence and safety with daily activities . THERAPEUTIC EXERCISE: (see below for minutes): Exercises per grid below to improve mobility, strength, balance and coordination. Required minimal verbal and manual cues to promote proper body alignment, promote proper body posture and promote proper body mechanics. Progressed resistance, range, repetitions and complexity of movement as indicated. MANUAL THERAPY: (see below for minutes): Joint mobilization and Soft tissue mobilization was utilized and necessary because of the patient's restricted joint motion, painful spasm, loss of articular motion and restricted motion of soft tissue. MODALITIES: (see below for minutes): to decrease pain   SELF CARE: (see below for minutes): Procedure(s) (per grid) utilized to improve and/or restore self-care/home management as related to dressing, bathing and grooming. Required minimal verbal cueing to facilitate activities of daily living skills and compensatory activities    Goals: (Goals have been discussed and agreed upon with patient.)  Short-Term Functional Goals: Time Frame: 6 weeks  1. Independent performance of home exercise program  2.  Pt to demonstrate improved TUG test = 13 seconds  3. Improved capacity to perform bed mobility with ability to right side SLR and leg cross without assist of upper extremities  Discharge Goals: Time Frame: 12 weeks  1. Improve TUG test to 11 seconds or better  2. Improve 5x/sit-stand to 12 seconds or better  3. Improve LE strengths (quads/hams) 25% or better to allow strong stride for irregular surface walking without fall risk. Date: 6/2/22  (visit 1) 6/6/22 (visit 2) 6/8/22 (visit 3) 6/13/22 (visit 4)    Modalities:                                Therapeutic Exercise: 25 min 45 min 40 min 32 min     LTR's w/ legs over  Physioball, assisted, 3 min Seated stepper: 5 min, level 1 resist Seated stepper: 6 min @ level 5 resist 30 rpm or > LTR's: legs over ball: AROM x 2 min, resisted x 10 L/R     SB hamstring curls x 20 Lower trunk rotation w/legs over physioball - 1 min assisted 2 min AROM LAQ's w/ 10#: 2 x 15 each L/R SB hamstring curls: 3 x 10      Bridges x 20 SB hamstring curls - assisted: 30x R SLR's w/ leg off table: 3 x 10 , assisted Supine R long roll hip IR's: Manl resist 2 x 10      Hip logrolling AROM: 20 x each L/R R /L su stretches: 2 min each L/R Supine manl.  Resist R hip logrolling to IR: 10x, f/b isometric 10\" holds x 3 hooklying hip add R: 10x, f/b ball squeeze sukhjinder x 10 x 5\"     R SLR's assisted: 3 x 5x Bilat hip adductor isometrics hooklying: 20 x 5\" Bridges w/ legs over bolster: 15/f/b 15 w/10# on abdomen Bridges: 30 , w/ 10# on abdomen and knees over bolster     LAQ's: 6# , L/R, single set to fatigue: 17x each R hip adductor bent leg fallout/returns: 4 x 5 reps Hooklying  R hip bent leg fallouts- returns: 2 x 10 R SLR\"s: leg off table: 3 x 10       R SLR's: 4 x 5  LAQ's DL 17.5# 3 x 8      LAQ's: 6# bilat: 20x        Sit-stands (elevated seat) : 4 x 5 reps                              Proprioceptive Activities:                                Manual Therapy:                        Functional Activities:   15 min 12 min Forward step ups: 6\" 2 x 10 each L/R Long stride walking in // bars: 10 laps       Standing in // bars: stride forward/back (hands on bars): 2 x 10 each L/R Side stepping in // bars w/ wide steps 4 laps. Long stride walking in // bars: 5 laps w/ 2 hands, 3 laps w/ R hand only                  Treatment/Session Summary:    · Treatment Assessment: Able to advance into long stride length w/ hand support on // bars, will integrate this into overground stride walking. · Communication/Consultation:  None today  · Equipment provided today:  None  · Recommendations/Intent for next treatment session: Next visit will focus on LE strength with particular focus on R hip flexion/adduction weakness, hip and trunk mobility training, bed mobility activities, sit-stand transfers and step training, narrow base balance, stride length activities.  Home exercise program.    Total Treatment Billable Duration:  44 minutes   Time In: 1015  Time Out: [address] [facility]       Charge Capture  }Post Session Pain  MedBridge Portal  MD Guidelines  Scanned Media  Benefits  MyChart    Future Appointments   Date Time Provider Dot Gusman   6/15/2022  8:45 AM Joanna Steve, PT [facility]   6/20/2022  8:45 AM Joanna Steve, PT [facility]   6/22/2022  8:45 AM Joanna Steve, PT [facility]   6/27/2022  8:45 AM Joanna Steve, PT [facility]   6/29/2022  8:45 AM Joanna Steve, PT [facility] O   7/8/2022  9:00 AM [id number] INJECTION/CATH [id number] [facility] AMB   8/10/2022  8:15 AM Adwoa Christianson MD [id number] [facility] AMB   12/2/2022  8:15 AM TRIM LAB RESOURCE TRIM [facility] AMB   12/9/2022  8:30 AM Sanjiv Gamez DO TRIM [facility] AMB

## 2022-06-15 ENCOUNTER — HOSPITAL ENCOUNTER (OUTPATIENT)
Dept: PHYSICAL THERAPY | Age: 77
Setting detail: RECURRING SERIES
Discharge: HOME | End: 2022-06-18
Payer: MEDICARE

## 2022-06-15 PROCEDURE — 97530 THERAPEUTIC ACTIVITIES: CPT

## 2022-06-15 PROCEDURE — 97110 THERAPEUTIC EXERCISES: CPT

## 2022-06-15 NOTE — PROGRESS NOTES
Brown Goodell Comin  : 1945  Primary: Medicare Part A And B  Secondary: [address] [place]  [address] [facility] [address] [address]  Phone: [phone]  Fax: [phone] Plan Frequency: 2 times a week for 12 weeks    Plan of Care/Certification Expiration Date: 22      PT Visit Info:   No data recorded    OUTPATIENT PHYSICAL THERAPY:OP NOTE TYPE: Treatment Note 6/15/2022       Episode  }Appt Desk              Treatment Diagnosis:  Generalized Muscle Weakness (M62.81)  Difficulty in walking, Not elsewhere classified (R26.2)  Other abnormalities of gait and mobility (R26.89)  Medical/Referring Diagnosis:  Balance problems [R26.89]  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 Restrictions/Precautions:  Restrictions/Precautions: None  Required Braces or Orthoses?: No  No data recorded   Interventions Planned (Treatment may consist of any combination of the following):    Current Treatment Recommendations: Strengthening; ROM; Balance training; Functional mobility training; Transfer training; Endurance training; Gait training; Stair training; Home exercise program; Therapeutic activities     Subjective Comments: Wobbly gait, difficulty picking up right hip to put it on the Nu-Step pedal.      Initial:None}     /10Post Session:None        /10  Medications Last Reviewed:  6/15/2022  Updated Objective Findings:  Below findings from initial evaluation unless otherwise noted. Observation  Posture: pt stands with hips/knees slightly flexed, hips externally rotated  TUG Test: 16.3 seconds  5x sit -stand: 17.1 seconds  Bed mobility: able to half-range bridge, side scoot L/R without assistance.  Unable to R side SLR without assistance - has poor R hip flexor muscle contractions during SLR. ROM (R/L in °) AROM(PROM)  Hip flexion  (115)/(110)  Hip extension  0/0  Hip ER  (38)/(40)  Hip IR   (22)/(20)    Knee extension  (-9)/(-4)  Knee flexion   (130)/(135)    Ankle DF   (10)/(10)  Ankle PF   WNL/WNL    Strength (R/L)   Hip flexion  3+/3-  Hip ABD  3-/3+  Hip extension  4-/4-  Hip ER  4-/4-  Hip IR   3-/4-    Knee extension  11.4kg/13kg  Knee flexion   10.5kg/10kg    Ankle DF   4+/4+  Ankle PF   4-/4-  Ankle eversion  4/4+  Ankle inversion  4/4    Treatment      TREATMENT:   THERAPEUTIC ACTIVITY: ( see below for minutes): Therapeutic activities per grid below to improve mobility, strength, balance and coordination. Required minimal visual, verbal, manual and tactile cues to improve independence and safety with daily activities . THERAPEUTIC EXERCISE: (see below for minutes): Exercises per grid below to improve mobility, strength, balance and coordination. Required minimal verbal and manual cues to promote proper body alignment, promote proper body posture and promote proper body mechanics. Progressed resistance, range, repetitions and complexity of movement as indicated. MANUAL THERAPY: (see below for minutes): Joint mobilization and Soft tissue mobilization was utilized and necessary because of the patient's restricted joint motion, painful spasm, loss of articular motion and restricted motion of soft tissue. MODALITIES: (see below for minutes): to decrease pain   SELF CARE: (see below for minutes): Procedure(s) (per grid) utilized to improve and/or restore self-care/home management as related to dressing, bathing and grooming. Required minimal verbal cueing to facilitate activities of daily living skills and compensatory activities    Goals: (Goals have been discussed and agreed upon with patient.)  Short-Term Functional Goals: Time Frame: 6 weeks  1. Independent performance of home exercise program  2. Pt to demonstrate improved TUG test = 13 seconds  3.  Improved capacity to perform bed mobility with ability to right side SLR and leg cross without assist of upper extremities  Discharge Goals: Time Frame: 12 weeks  1. Improve TUG test to 11 seconds or better  2. Improve 5x/sit-stand to 12 seconds or better  3. Improve LE strengths (quads/hams) 25% or better to allow strong stride for irregular surface walking without fall risk. Date: 6/2/22  (visit 1) 6/6/22 (visit 2) 6/8/22 (visit 3) 6/13/22 (visit 4) 6/15/22 (visit 5)   Modalities:                                Therapeutic Exercise: 25 min 45 min 40 min 32 min 30 min    LTR's w/ legs over  Physioball, assisted, 3 min Seated stepper: 5 min, level 1 resist Seated stepper: 6 min @ level 5 resist 30 rpm or > LTR's: legs over ball: AROM x 2 min, resisted x 10 L/R Seated stepper: 8 min, level 4 resist    SB hamstring curls x 20 Lower trunk rotation w/legs over physioball - 1 min assisted 2 min AROM LAQ's w/ 10#: 2 x 15 each L/R SB hamstring curls: 3 x 10  R hip logrolling - manl resist: 20 and 10    Bridges x 20 SB hamstring curls - assisted: 30x R SLR's w/ leg off table: 3 x 10 , assisted Supine R long roll hip IR's: Manl resist 2 x 10  R hop bent leg fall outs- returns: 2 x 12    Hip logrolling AROM: 20 x each L/R R /L su stretches: 2 min each L/R Supine manl.  Resist R hip logrolling to IR: 10x, f/b isometric 10\" holds x 3 hooklying hip add R: 10x, f/b ball squeeze sukhjinder x 10 x 5\" SB hamstring curls : 2 x 10    R SLR's assisted: 3 x 5x Bilat hip adductor isometrics hooklying: 20 x 5\" Bridges w/ legs over bolster: 15/f/b 15 w/10# on abdomen Bridges: 30 , w/ 10# on abdomen and knees over bolster Bridges: 2 x 12    LAQ's: 6# , L/R, single set to fatigue: 17x each R hip adductor bent leg fallout/returns: 4 x 5 reps Hooklying  R hip bent leg fallouts- returns: 2 x 10 R SLR\"s: leg off table: 3 x 10       R SLR's: 4 x 5  LAQ's DL 17.5# 3 x 8      LAQ's: 6# bilat: 20x        Sit-stands (elevated seat) : 4 x 5 reps Proprioceptive Activities:                                Manual Therapy:                        Functional Activities:   15 min 12 min 15 min      Forward step ups: 6\" 2 x 10 each L/R Long stride walking in // bars: 10 laps Stride forwards/ backs x 10 each L/R, 1hand support      Standing in // bars: stride forward/back (hands on bars): 2 x 10 each L/R Side stepping in // bars w/ wide steps 4 laps. Long stride walking x 60' w/ ski pole, verbal cues      Long stride walking in // bars: 5 laps w/ 2 hands, 3 laps w/ R hand only   Wicket step-overs in // bars x 12 x 5 reps               Treatment/Session Summary:    · Treatment Assessment: Hip adductors and internal rotators fade rapidly during resisted training. Pt struggles with longer stride walking. · Communication/Consultation:  None today  · Equipment provided today:  None  · Recommendations/Intent for next treatment session: Next visit will focus on LE strength with particular focus on R hip flexion/adduction weakness, hip and trunk mobility training, bed mobility activities, sit-stand transfers and step training, narrow base balance, stride length activities.  Home exercise program.    Total Treatment Billable Duration:  45 minutes   Time In: 6044  Time Out: 0932    Kamilah Luis, PT       Charge Capture  }Post Session Pain  MedBridge Portal  MD Guidelines  Scanned Media  Benefits  MyChart    Future Appointments   Date Time Provider Dot Gusman   6/20/2022  8:45 AM Syd Mcdonald PT [facility]   6/22/2022  8:45 AM Kamilah Luis PT [facility]   6/27/2022  8:45 AM Kamilah Luis PT [facility]   6/29/2022  8:45 AM Kamilah Luis PT [facility]   7/8/2022  9:00 AM [id number] INJECTION/CATH [id number] [facility]   8/10/2022  8:15 AM Reynold Carpenter MD [id number] [facility]   12/2/2022  8:15 AM TRIM LAB RESOURCE TRIM [facility]   12/9/2022  8:30 AM Verona Hernandez,  TRIM [facility]

## 2022-06-20 ENCOUNTER — HOSPITAL ENCOUNTER (OUTPATIENT)
Dept: PHYSICAL THERAPY | Age: 77
Setting detail: RECURRING SERIES
Discharge: HOME | End: 2022-06-23
Payer: MEDICARE

## 2022-06-20 PROCEDURE — 97530 THERAPEUTIC ACTIVITIES: CPT

## 2022-06-20 PROCEDURE — 97110 THERAPEUTIC EXERCISES: CPT

## 2022-06-20 NOTE — PROGRESS NOTES
Thomas Nolasco  : 1945  Primary: Medicare Part A And B  Secondary: [address] [place]  [address] [facility] [address]  Phone: [phone]  Fax: [phone] Plan Frequency: 2 times a week for 12 weeks    Plan of Care/Certification Expiration Date: 22      PT Visit Info:   No data recorded    OUTPATIENT PHYSICAL THERAPY:OP NOTE TYPE: Treatment Note 2022       Episode  }Appt Desk              Treatment Diagnosis:  Generalized Muscle Weakness (M62.81)  Difficulty in walking, Not elsewhere classified (R26.2)  Other abnormalities of gait and mobility (R26.89)  Medical/Referring Diagnosis:  Balance problems [R26.89]  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 Restrictions/Precautions:  Restrictions/Precautions: None  Required Braces or Orthoses?: No  No data recorded   Interventions Planned (Treatment may consist of any combination of the following):    Current Treatment Recommendations: Strengthening; ROM; Balance training; Functional mobility training; Transfer training; Endurance training; Gait training; Stair training; Home exercise program; Therapeutic activities     Subjective Comments: Not much change in his walking ability so far. Initial:None}     /10Post Session:None        /10  Medications Last Reviewed:  2022  Updated Objective Findings:  Below findings from initial evaluation unless otherwise noted. Observation  Posture: pt stands with hips/knees slightly flexed, hips externally rotated  TUG Test: 16.3 seconds  5x sit -stand: 17.1 seconds  Bed mobility: able to half-range bridge, side scoot L/R without assistance. Unable to R side SLR without assistance - has poor R hip flexor muscle contractions during SLR.     ROM (R/L in °) AROM(PROM)  Hip flexion  (115)/(110)  Hip extension  0/0  Hip ER  (38)/(40)  Hip IR   (22)/(20)    Knee extension  (-9)/(-4)  Knee flexion   (130)/(135)    Ankle DF   (10)/(10)  Ankle PF   WNL/WNL    Strength (R/L)   Hip flexion  3+/3-  Hip ABD  3-/3+  Hip extension  4-/4-  Hip ER  4-/4-  Hip IR   3-/4-    Knee extension  11.4kg/13kg  Knee flexion   10.5kg/10kg    Ankle DF   4+/4+  Ankle PF   4-/4-  Ankle eversion  4/4+  Ankle inversion  4/4    Treatment      TREATMENT:   THERAPEUTIC ACTIVITY: ( see below for minutes): Therapeutic activities per grid below to improve mobility, strength, balance and coordination. Required minimal visual, verbal, manual and tactile cues to improve independence and safety with daily activities . THERAPEUTIC EXERCISE: (see below for minutes): Exercises per grid below to improve mobility, strength, balance and coordination. Required minimal verbal and manual cues to promote proper body alignment, promote proper body posture and promote proper body mechanics. Progressed resistance, range, repetitions and complexity of movement as indicated. MANUAL THERAPY: (see below for minutes): Joint mobilization and Soft tissue mobilization was utilized and necessary because of the patient's restricted joint motion, painful spasm, loss of articular motion and restricted motion of soft tissue. MODALITIES: (see below for minutes): to decrease pain   SELF CARE: (see below for minutes): Procedure(s) (per grid) utilized to improve and/or restore self-care/home management as related to dressing, bathing and grooming. Required minimal verbal cueing to facilitate activities of daily living skills and compensatory activities    Goals: (Goals have been discussed and agreed upon with patient.)  Short-Term Functional Goals: Time Frame: 6 weeks  1. Independent performance of home exercise program  2. Pt to demonstrate improved TUG test = 13 seconds  3.  Improved capacity to perform bed mobility with ability to right side SLR and leg cross without assist of upper extremities  Discharge Goals: Time Frame: 12 weeks  1. Improve TUG test to 11 seconds or better  2. Improve 5x/sit-stand to 12 seconds or better  3. Improve LE strengths (quads/hams) 25% or better to allow strong stride for irregular surface walking without fall risk. Date: 6/2/22  (visit 1) 6/6/22 (visit 2) 6/8/22 (visit 3) 6/13/22 (visit 4) 6/15/22 (visit 5) 6/20/22 (visit 6)      Modalities:                                                Therapeutic Exercise: 25 min 45 min 40 min 32 min 30 min 30 min       LTR's w/ legs over  Physioball, assisted, 3 min Seated stepper: 5 min, level 1 resist Seated stepper: 6 min @ level 5 resist 30 rpm or > LTR's: legs over ball: AROM x 2 min, resisted x 10 L/R Seated stepper: 8 min, level 4 resist Seated stepper, 8 min, level 5 resist       SB hamstring curls x 20 Lower trunk rotation w/legs over physioball - 1 min assisted 2 min AROM LAQ's w/ 10#: 2 x 15 each L/R SB hamstring curls: 3 x 10  R hip logrolling - manl resist: 20 and 10 R hip active logrolling x 20       Bridges x 20 SB hamstring curls - assisted: 30x R SLR's w/ leg off table: 3 x 10 , assisted Supine R long roll hip IR's: Manl resist 2 x 10  R hop bent leg fall outs- returns: 2 x 12 R hip bent leg fall outs-returns : 3 x10        Hip logrolling AROM: 20 x each L/R R /L su stretches: 2 min each L/R Supine manl.  Resist R hip logrolling to IR: 10x, f/b isometric 10\" holds x 3 hooklying hip add R: 10x, f/b ball squeeze sukhjinder x 10 x 5\" SB hamstring curls : 2 x 10 R SLRs : 3 x 8        R SLR's assisted: 3 x 5x Bilat hip adductor isometrics hooklying: 20 x 5\" Bridges w/ legs over bolster: 15/f/b 15 w/10# on abdomen Bridges: 30 , w/ 10# on abdomen and knees over bolster Bridges: 2 x 12 R LAQ's w/ cuff wt: 15#, 8, 6, 6, manl resist x 8        LAQ's: 6# , L/R, single set to fatigue: 17x each R hip adductor bent leg fallout/returns: 4 x 5 reps Hooklying R hip bent leg fallouts- returns: 2 x 10 R SLR\"s: leg off table: 3 x 10           R SLR's: 4 x 5  LAQ's DL 17.5# 3 x 8          LAQ's: 6# bilat: 20x            Sit-stands (elevated seat) : 4 x 5 reps                                              Proprioceptive Activities:                                                Manual Therapy:                                    Functional Activities:   15 min 12 min 15 min 15 min         Forward step ups: 6\" 2 x 10 each L/R Long stride walking in // bars: 10 laps Stride forwards/ backs x 10 each L/R, 1hand support SItt-stands w/ elevated seat: 4 x 10          Standing in // bars: stride forward/back (hands on bars): 2 x 10 each L/R Side stepping in // bars w/ wide steps 4 laps. Long stride walking x 60' w/ ski pole, verbal cues Wicket step overs: 7 laps in // bars (5 wickets)         Long stride walking in // bars: 5 laps w/ 2 hands, 3 laps w/ R hand only   Wicket step-overs in // bars x 12 x 5 reps Single wicket long sttride step overs L/R x 2 min            Long stride walking in // bars x 5 laps. Treatment/Session Summary:    · Treatment Assessment:  Slight improvement in hip adductor performance - able to adduct more reps/set. More work today on stride lengthening activities, with pt encouraged to work along countertop extending step lengths back and forth. · Communication/Consultation:  None today  · Equipment provided today:  None  · Recommendations/Intent for next treatment session: Next visit will focus on LE strength with particular focus on R hip flexion/adduction weakness, hip and trunk mobility training, bed mobility activities, sit-stand transfers and step training, narrow base balance, stride length activities.  Home exercise program.    Total Treatment Billable Duration:  45 minutes   Time In: 0845  Time Out: 0930    Gilda Bañuelos PT       Charge Capture  }Post Session Pain  MedBridge Portal  MD Guidelines  Scanned Media  Benefits [employer]    Future Appointments   Date Time Provider Dot Gusman   6/22/2022  8:45 AM Kamilah Luis, PT [facility]   6/27/2022  8:45 AM Kamilah Luis PT [facility]   6/29/2022  8:45 AM Kamilah Luis PT [facility]   7/8/2022  9:00 AM [id number] INJECTION/CATH [id number] [facility]   8/10/2022  8:15 AM Reynold Carpenter MD [id number] [facility]   12/2/2022  8:15 AM TRIM LAB RESOURCE TRIM [facility]   12/9/2022  8:30 AM Verona Hernandez DO TRIM [facility]

## 2022-06-21 ENCOUNTER — HOSPITAL ENCOUNTER (INPATIENT)
Age: 77
LOS: 7 days | Discharge: NURSING, REHAB OR LONG-TERM CARE | DRG: 698 | End: 2022-06-28
Payer: MEDICARE

## 2022-06-21 ENCOUNTER — APPOINTMENT (OUTPATIENT)
Dept: ULTRASOUND IMAGING | Age: 77
DRG: 698 | End: 2022-06-21
Payer: MEDICARE

## 2022-06-21 ENCOUNTER — APPOINTMENT (OUTPATIENT)
Dept: GENERAL RADIOLOGY | Age: 77
DRG: 698 | End: 2022-06-21
Payer: MEDICARE

## 2022-06-21 DIAGNOSIS — R65.10 SIRS (SYSTEMIC INFLAMMATORY RESPONSE SYNDROME) (HCC): ICD-10-CM

## 2022-06-21 DIAGNOSIS — F41.9 ANXIETY: ICD-10-CM

## 2022-06-21 DIAGNOSIS — N39.0 URINARY TRACT INFECTION ASSOCIATED WITH INDWELLING URETHRAL CATHETER, INITIAL ENCOUNTER (HCC): Primary | ICD-10-CM

## 2022-06-21 DIAGNOSIS — T83.511A URINARY TRACT INFECTION ASSOCIATED WITH INDWELLING URETHRAL CATHETER, INITIAL ENCOUNTER (HCC): Primary | ICD-10-CM

## 2022-06-21 DIAGNOSIS — E86.0 DEHYDRATION: ICD-10-CM

## 2022-06-21 PROBLEM — E03.9 HYPOTHYROIDISM: Status: ACTIVE | Noted: 2019-06-14

## 2022-06-21 PROBLEM — R65.20 SEPSIS WITH ACUTE ORGAN DYSFUNCTION (HCC): Status: ACTIVE | Noted: 2022-06-21

## 2022-06-21 PROBLEM — E43 SEVERE PROTEIN-CALORIE MALNUTRITION (HCC): Status: ACTIVE | Noted: 2019-05-19

## 2022-06-21 PROBLEM — A41.9 SEPSIS WITHOUT ACUTE ORGAN DYSFUNCTION (HCC): Status: ACTIVE | Noted: 2022-06-21

## 2022-06-21 PROBLEM — F33.0 MILD EPISODE OF RECURRENT MAJOR DEPRESSIVE DISORDER (HCC): Status: ACTIVE | Noted: 2022-06-21

## 2022-06-21 PROBLEM — Z43.3 COLOSTOMY CARE (HCC): Status: ACTIVE | Noted: 2019-05-19

## 2022-06-21 LAB
ALBUMIN SERPL-MCNC: 4.3 G/DL (ref 3.2–4.6)
ALBUMIN/GLOB SERPL: 1.3 {RATIO} (ref 1.2–3.5)
ALP SERPL-CCNC: 80 U/L (ref 50–136)
ALT SERPL-CCNC: 39 U/L (ref 12–65)
ANION GAP SERPL CALC-SCNC: 11 MMOL/L (ref 7–16)
APPEARANCE UR: ABNORMAL
AST SERPL-CCNC: 37 U/L (ref 15–37)
BACTERIA URNS QL MICRO: ABNORMAL /HPF
BASOPHILS # BLD: 0 K/UL (ref 0–0.2)
BASOPHILS NFR BLD: 1 % (ref 0–2)
BILIRUB SERPL-MCNC: 0.6 MG/DL (ref 0.2–1.1)
BILIRUB UR QL: NEGATIVE
BUN SERPL-MCNC: 46 MG/DL (ref 8–23)
CALCIUM SERPL-MCNC: 9.2 MG/DL (ref 8.3–10.4)
CHLORIDE SERPL-SCNC: 99 MMOL/L (ref 98–107)
CO2 SERPL-SCNC: 21 MMOL/L (ref 21–32)
COLOR UR: ABNORMAL
CREAT SERPL-MCNC: 2 MG/DL (ref 0.8–1.5)
DIFFERENTIAL METHOD BLD: ABNORMAL
EKG ATRIAL RATE: 93 BPM
EKG DIAGNOSIS: NORMAL
EKG P AXIS: 65 DEGREES
EKG P-R INTERVAL: 194 MS
EKG Q-T INTERVAL: 346 MS
EKG QRS DURATION: 106 MS
EKG QTC CALCULATION (BAZETT): 431 MS
EKG R AXIS: 116 DEGREES
EKG T AXIS: 60 DEGREES
EKG VENTRICULAR RATE: 93 BPM
EOSINOPHIL # BLD: 0 K/UL (ref 0–0.8)
EOSINOPHIL NFR BLD: 1 % (ref 0.5–7.8)
EPI CELLS #/AREA URNS HPF: ABNORMAL /HPF
ERYTHROCYTE [DISTWIDTH] IN BLOOD BY AUTOMATED COUNT: 13.5 % (ref 11.9–14.6)
FLUAV AG NPH QL IA: NEGATIVE
FLUBV AG NPH QL IA: NEGATIVE
GLOBULIN SER CALC-MCNC: 3.4 G/DL (ref 2.3–3.5)
GLUCOSE SERPL-MCNC: 92 MG/DL (ref 65–100)
GLUCOSE UR STRIP.AUTO-MCNC: NEGATIVE MG/DL
HCT VFR BLD AUTO: 42.2 % (ref 41.1–50.3)
HGB BLD-MCNC: 13.3 G/DL (ref 13.6–17.2)
HGB UR QL STRIP: ABNORMAL
IMM GRANULOCYTES # BLD AUTO: 0 K/UL (ref 0–0.5)
IMM GRANULOCYTES NFR BLD AUTO: 0 % (ref 0–5)
KETONES UR QL STRIP.AUTO: NEGATIVE MG/DL
LACTATE SERPL-SCNC: 1.4 MMOL/L (ref 0.4–2)
LACTATE SERPL-SCNC: 1.5 MMOL/L (ref 0.4–2)
LACTATE SERPL-SCNC: 2.9 MMOL/L (ref 0.4–2)
LEUKOCYTE ESTERASE UR QL STRIP.AUTO: ABNORMAL
LYMPHOCYTES # BLD: 0.2 K/UL (ref 0.5–4.6)
LYMPHOCYTES NFR BLD: 5 % (ref 13–44)
MCH RBC QN AUTO: 29.2 PG (ref 26.1–32.9)
MCHC RBC AUTO-ENTMCNC: 31.5 G/DL (ref 31.4–35)
MCV RBC AUTO: 92.5 FL (ref 79.6–97.8)
MONOCYTES # BLD: 0 K/UL (ref 0.1–1.3)
MONOCYTES NFR BLD: 1 % (ref 4–12)
NEUTS SEG # BLD: 3.3 K/UL (ref 1.7–8.2)
NEUTS SEG NFR BLD: 93 % (ref 43–78)
NITRITE UR QL STRIP.AUTO: NEGATIVE
NRBC # BLD: 0 K/UL (ref 0–0.2)
OTHER OBSERVATIONS: ABNORMAL
PH UR STRIP: 6 [PH] (ref 5–9)
PLATELET # BLD AUTO: 203 K/UL (ref 150–450)
PMV BLD AUTO: 9.5 FL (ref 9.4–12.3)
POTASSIUM SERPL-SCNC: 4.7 MMOL/L (ref 3.5–5.1)
PROCALCITONIN SERPL-MCNC: 0.18 NG/ML (ref 0–0.49)
PROT SERPL-MCNC: 7.7 G/DL (ref 6.3–8.2)
PROT UR STRIP-MCNC: 100 MG/DL
RBC # BLD AUTO: 4.56 M/UL (ref 4.23–5.6)
RBC #/AREA URNS HPF: >100 /HPF
SARS-COV-2 RDRP RESP QL NAA+PROBE: NOT DETECTED
SODIUM SERPL-SCNC: 131 MMOL/L (ref 136–145)
SOURCE: NORMAL
SP GR UR REFRACTOMETRY: 1.01 (ref 1–1.02)
SPECIMEN SOURCE: NORMAL
UROBILINOGEN UR QL STRIP.AUTO: 0.2 EU/DL (ref 0.2–1)
WBC # BLD AUTO: 3.6 K/UL (ref 4.3–11.1)
WBC URNS QL MICRO: >100 /HPF

## 2022-06-21 PROCEDURE — 76775 US EXAM ABDO BACK WALL LIM: CPT

## 2022-06-21 PROCEDURE — 6360000002 HC RX W HCPCS: Performed by: FAMILY MEDICINE

## 2022-06-21 PROCEDURE — 83605 ASSAY OF LACTIC ACID: CPT

## 2022-06-21 PROCEDURE — 36415 COLL VENOUS BLD VENIPUNCTURE: CPT

## 2022-06-21 PROCEDURE — 99285 EMERGENCY DEPT VISIT HI MDM: CPT

## 2022-06-21 PROCEDURE — 2580000003 HC RX 258: Performed by: INTERNAL MEDICINE

## 2022-06-21 PROCEDURE — 87150 DNA/RNA AMPLIFIED PROBE: CPT

## 2022-06-21 PROCEDURE — 96374 THER/PROPH/DIAG INJ IV PUSH: CPT

## 2022-06-21 PROCEDURE — 87186 SC STD MICRODIL/AGAR DIL: CPT

## 2022-06-21 PROCEDURE — 87086 URINE CULTURE/COLONY COUNT: CPT

## 2022-06-21 PROCEDURE — 87635 SARS-COV-2 COVID-19 AMP PRB: CPT

## 2022-06-21 PROCEDURE — 6370000000 HC RX 637 (ALT 250 FOR IP): Performed by: INTERNAL MEDICINE

## 2022-06-21 PROCEDURE — 2580000003 HC RX 258: Performed by: FAMILY MEDICINE

## 2022-06-21 PROCEDURE — 84145 PROCALCITONIN (PCT): CPT

## 2022-06-21 PROCEDURE — 2580000003 HC RX 258

## 2022-06-21 PROCEDURE — 87205 SMEAR GRAM STAIN: CPT

## 2022-06-21 PROCEDURE — 87040 BLOOD CULTURE FOR BACTERIA: CPT

## 2022-06-21 PROCEDURE — 93005 ELECTROCARDIOGRAM TRACING: CPT

## 2022-06-21 PROCEDURE — 87077 CULTURE AEROBIC IDENTIFY: CPT

## 2022-06-21 PROCEDURE — 87804 INFLUENZA ASSAY W/OPTIC: CPT

## 2022-06-21 PROCEDURE — 71045 X-RAY EXAM CHEST 1 VIEW: CPT

## 2022-06-21 PROCEDURE — 80053 COMPREHEN METABOLIC PANEL: CPT

## 2022-06-21 PROCEDURE — 81001 URINALYSIS AUTO W/SCOPE: CPT

## 2022-06-21 PROCEDURE — 6360000002 HC RX W HCPCS

## 2022-06-21 PROCEDURE — 1100000000 HC RM PRIVATE

## 2022-06-21 PROCEDURE — 85025 COMPLETE CBC W/AUTO DIFF WBC: CPT

## 2022-06-21 PROCEDURE — 6360000002 HC RX W HCPCS: Performed by: INTERNAL MEDICINE

## 2022-06-21 PROCEDURE — 87088 URINE BACTERIA CULTURE: CPT

## 2022-06-21 RX ORDER — OLANZAPINE 5 MG/1
7.5 TABLET ORAL NIGHTLY
Status: DISCONTINUED | OUTPATIENT
Start: 2022-06-21 | End: 2022-06-28 | Stop reason: HOSPADM

## 2022-06-21 RX ORDER — ACETAMINOPHEN 650 MG/1
650 SUPPOSITORY RECTAL EVERY 6 HOURS PRN
Status: DISCONTINUED | OUTPATIENT
Start: 2022-06-21 | End: 2022-06-28 | Stop reason: HOSPADM

## 2022-06-21 RX ORDER — SODIUM CHLORIDE 9 MG/ML
INJECTION, SOLUTION INTRAVENOUS PRN
Status: DISCONTINUED | OUTPATIENT
Start: 2022-06-21 | End: 2022-06-28 | Stop reason: HOSPADM

## 2022-06-21 RX ORDER — ONDANSETRON 2 MG/ML
4 INJECTION INTRAMUSCULAR; INTRAVENOUS EVERY 6 HOURS PRN
Status: DISCONTINUED | OUTPATIENT
Start: 2022-06-21 | End: 2022-06-28 | Stop reason: HOSPADM

## 2022-06-21 RX ORDER — SODIUM CHLORIDE, SODIUM LACTATE, POTASSIUM CHLORIDE, AND CALCIUM CHLORIDE .6; .31; .03; .02 G/100ML; G/100ML; G/100ML; G/100ML
1000 INJECTION, SOLUTION INTRAVENOUS
Status: COMPLETED | OUTPATIENT
Start: 2022-06-21 | End: 2022-06-21

## 2022-06-21 RX ORDER — CLONAZEPAM 1 MG/1
4 TABLET ORAL NIGHTLY
Status: DISCONTINUED | OUTPATIENT
Start: 2022-06-21 | End: 2022-06-28 | Stop reason: HOSPADM

## 2022-06-21 RX ORDER — ONDANSETRON 4 MG/1
4 TABLET, ORALLY DISINTEGRATING ORAL EVERY 8 HOURS PRN
Status: DISCONTINUED | OUTPATIENT
Start: 2022-06-21 | End: 2022-06-28 | Stop reason: HOSPADM

## 2022-06-21 RX ORDER — BISACODYL 10 MG
10 SUPPOSITORY, RECTAL RECTAL DAILY PRN
Status: DISCONTINUED | OUTPATIENT
Start: 2022-06-21 | End: 2022-06-28 | Stop reason: HOSPADM

## 2022-06-21 RX ORDER — POLYETHYLENE GLYCOL 3350 17 G/17G
17 POWDER, FOR SOLUTION ORAL DAILY PRN
Status: DISCONTINUED | OUTPATIENT
Start: 2022-06-21 | End: 2022-06-28 | Stop reason: HOSPADM

## 2022-06-21 RX ORDER — SODIUM CHLORIDE 0.9 % (FLUSH) 0.9 %
5-40 SYRINGE (ML) INJECTION EVERY 12 HOURS SCHEDULED
Status: DISCONTINUED | OUTPATIENT
Start: 2022-06-21 | End: 2022-06-28 | Stop reason: HOSPADM

## 2022-06-21 RX ORDER — AMITRIPTYLINE HYDROCHLORIDE 50 MG/1
100 TABLET, FILM COATED ORAL NIGHTLY
Status: DISCONTINUED | OUTPATIENT
Start: 2022-06-21 | End: 2022-06-28 | Stop reason: HOSPADM

## 2022-06-21 RX ORDER — SODIUM CHLORIDE 0.9 % (FLUSH) 0.9 %
5-40 SYRINGE (ML) INJECTION PRN
Status: DISCONTINUED | OUTPATIENT
Start: 2022-06-21 | End: 2022-06-28 | Stop reason: HOSPADM

## 2022-06-21 RX ORDER — MAGNESIUM HYDROXIDE/ALUMINUM HYDROXICE/SIMETHICONE 120; 1200; 1200 MG/30ML; MG/30ML; MG/30ML
30 SUSPENSION ORAL EVERY 6 HOURS PRN
Status: DISCONTINUED | OUTPATIENT
Start: 2022-06-21 | End: 2022-06-28 | Stop reason: HOSPADM

## 2022-06-21 RX ORDER — CARVEDILOL 6.25 MG/1
6.25 TABLET ORAL 2 TIMES DAILY WITH MEALS
Status: DISCONTINUED | OUTPATIENT
Start: 2022-06-21 | End: 2022-06-28 | Stop reason: HOSPADM

## 2022-06-21 RX ORDER — ENOXAPARIN SODIUM 100 MG/ML
30 INJECTION SUBCUTANEOUS DAILY
Status: DISCONTINUED | OUTPATIENT
Start: 2022-06-21 | End: 2022-06-28 | Stop reason: HOSPADM

## 2022-06-21 RX ORDER — ACETAMINOPHEN 325 MG/1
650 TABLET ORAL EVERY 6 HOURS PRN
Status: DISCONTINUED | OUTPATIENT
Start: 2022-06-21 | End: 2022-06-28 | Stop reason: HOSPADM

## 2022-06-21 RX ORDER — FAMOTIDINE 20 MG/1
10 TABLET, FILM COATED ORAL DAILY PRN
Status: DISCONTINUED | OUTPATIENT
Start: 2022-06-21 | End: 2022-06-28 | Stop reason: HOSPADM

## 2022-06-21 RX ORDER — SODIUM CHLORIDE 9 MG/ML
INJECTION, SOLUTION INTRAVENOUS CONTINUOUS
Status: ACTIVE | OUTPATIENT
Start: 2022-06-21 | End: 2022-06-22

## 2022-06-21 RX ADMIN — AMITRIPTYLINE HYDROCHLORIDE 100 MG: 50 TABLET, FILM COATED ORAL at 21:37

## 2022-06-21 RX ADMIN — SODIUM CHLORIDE, POTASSIUM CHLORIDE, SODIUM LACTATE AND CALCIUM CHLORIDE 1000 ML: 600; 310; 30; 20 INJECTION, SOLUTION INTRAVENOUS at 06:07

## 2022-06-21 RX ADMIN — ACETAMINOPHEN 650 MG: 325 TABLET ORAL at 16:02

## 2022-06-21 RX ADMIN — CEFEPIME HYDROCHLORIDE 1000 MG: 1 INJECTION, POWDER, FOR SOLUTION INTRAMUSCULAR; INTRAVENOUS at 21:55

## 2022-06-21 RX ADMIN — SODIUM CHLORIDE, POTASSIUM CHLORIDE, SODIUM LACTATE AND CALCIUM CHLORIDE 1000 ML: 600; 310; 30; 20 INJECTION, SOLUTION INTRAVENOUS at 07:38

## 2022-06-21 RX ADMIN — SODIUM CHLORIDE, PRESERVATIVE FREE 5 ML: 5 INJECTION INTRAVENOUS at 21:39

## 2022-06-21 RX ADMIN — LEVOTHYROXINE SODIUM 125 MCG: 0.07 TABLET ORAL at 12:44

## 2022-06-21 RX ADMIN — CEFTRIAXONE 1000 MG: 1 INJECTION, POWDER, FOR SOLUTION INTRAMUSCULAR; INTRAVENOUS at 07:37

## 2022-06-21 RX ADMIN — OLANZAPINE 7.5 MG: 5 TABLET, FILM COATED ORAL at 21:37

## 2022-06-21 RX ADMIN — SODIUM CHLORIDE: 9 INJECTION, SOLUTION INTRAVENOUS at 17:09

## 2022-06-21 RX ADMIN — CARVEDILOL 6.25 MG: 6.25 TABLET, FILM COATED ORAL at 17:11

## 2022-06-21 RX ADMIN — CLONAZEPAM 4 MG: 1 TABLET ORAL at 21:37

## 2022-06-21 RX ADMIN — ENOXAPARIN SODIUM 30 MG: 100 INJECTION SUBCUTANEOUS at 13:31

## 2022-06-21 ASSESSMENT — PAIN DESCRIPTION - LOCATION: LOCATION: GENERALIZED

## 2022-06-21 ASSESSMENT — PAIN SCALES - GENERAL
PAINLEVEL_OUTOF10: 3
PAINLEVEL_OUTOF10: 0

## 2022-06-21 ASSESSMENT — ENCOUNTER SYMPTOMS
EYES NEGATIVE: 1
GASTROINTESTINAL NEGATIVE: 1
RESPIRATORY NEGATIVE: 1

## 2022-06-21 ASSESSMENT — PAIN - FUNCTIONAL ASSESSMENT: PAIN_FUNCTIONAL_ASSESSMENT: 0-10

## 2022-06-21 ASSESSMENT — PAIN DESCRIPTION - DESCRIPTORS: DESCRIPTORS: ACHING

## 2022-06-21 NOTE — ED PROVIDER NOTES
[employer] Emergency Department Provider Note                     PCP:                Malcolm Soto DO               Age: 64 y.o. Sex: male           ICD-10-CM    1. Urinary tract infection associated with indwelling urethral catheter, initial encounter ([facility] [address]  T83.511A     N39.0    2. Dehydration  E86.0    3. SIRS (systemic inflammatory response syndrome) ([facility])  R65.10        DISPOSITION Admitted 06/21/2022 07:46:56 AM       New Prescriptions    No medications on file       MDM  Number of Diagnoses or Management Options  Dehydration  SIRS (systemic inflammatory response syndrome) ([facility])  Urinary tract infection associated with indwelling urethral catheter, initial encounter [facility])  Diagnosis management comments: Differential diagnosis includes but is not limited to: UTI, sepsis, pyelonephritis, catheter obstruction, dehydration,    Initially puslike urine output was noted very little volume. Catheter was replaced and 400 cc of foul-smelling urine was returned followed by hematuria. Patient's white count was slightly low decreased however his lactic acid is over 2.5 he is tachycardic and tachypneic which meets 2 of the SIRS criteria. We will asked hospitalist to admit and they can get a urology consult while inpatient. Per RN report catheterization showed no resistance and was not difficult to replace the catheter.        Amount and/or Complexity of Data Reviewed  Clinical lab tests: ordered and reviewed  Tests in the radiology section of CPT®: ordered and reviewed  Tests in the medicine section of CPT®: ordered and reviewed  Decide to obtain previous medical records or to obtain history from someone other than the patient: yes  Review and summarize past medical records: yes  Independent visualization of images, tracings, or specimens: yes    Risk of Complications, Morbidity, and/or Mortality  Presenting problems: high  Diagnostic procedures: high  Management options: high    Patient Progress  Patient progress: stable      Orders Placed This Encounter   Procedures    Culture, Blood 1    Culture, Blood 1    Rapid influenza A/B antigens    COVID-19, Rapid    Culture, Urine    XR CHEST PORTABLE    US RETROPERITONEAL LIMITED    Lactic Acid    Lactic Acid    CBC with Auto Differential    CMP    Procalcitonin    Urinalysis w rflx microscopic    Protein / creatinine ratio, urine    Creatinine, Random Urine    Sodium, urine, random    Cardiac Monitor - ED Only    EKG 12 Lead    Saline lock IV    Insert/Charge John Catheter - Simple    ADMIT TO INPATIENT        No follow-up provider specified. Donta Plasencia is a 64 y.o. male who presents to the Emergency Department with chief complaint of    Chief Complaint   Patient presents with    Urinary Catheter    Urinary Retention      68-year-old male arrives EMS from home with a urinary catheter dysfunction. Patient was noted to have dark cloudy puslike urine in his urinary catheter. Patient has a history of bowel obstruction and had resection of his bowel currently has a colostomy. Patient has a chronic indwelling urinary catheter/John. Last time it was changes unknown. Patient has a unfortunate and complex past medical history with episodes of sepsis in the past.  There is also concern because he has had no urine output for several hours    The history is provided by the patient. Dysuria   This is a chronic problem. The problem has not changed since onset. Pertinent negatives include no chills. His past medical history is significant for urological procedure and catheterization. Review of Systems   Constitutional: Positive for activity change, appetite change and fatigue. Negative for chills and fever. HENT: Negative. Eyes: Negative. Respiratory: Negative. Cardiovascular: Negative. Gastrointestinal: Negative. Endocrine: Negative. Genitourinary: Positive for decreased urine volume and dysuria. Musculoskeletal: Negative. Skin: Negative. Neurological: Negative. Hematological: Negative. Psychiatric/Behavioral: Negative. All other systems reviewed and are negative. All other systems reviewed and are negative.       Past Medical History:   Diagnosis Date    Acute deep vein thrombosis (DVT) of non-extremity vein 5/20/2019    Family history of malignant neoplasm of gastrointestinal tract     mother colon/ovarian cancer 67    Fecal incontinence     LAR syndrome    John catheter in place 2022    patient stated- \"catheter due to them nicking his bladder and it won't heal\"    Former cigarette smoker     History of rectal cancer     Hypothyroid     stable w/med    Infection and inflammatory reaction due to other internal prosthetic devices, implants and grafts, subsequent encounter 2017    abd wound/mesh colostomy    Insomnia     takes meds    Major depression     Osteoarthritis, hand     Personal history of colonic polyps 9/2013    x 1    Personal history of malignant neoplasm of rectum, rectosigmoid junction, 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    surgery and radiation    Psychiatric disorder     anxiety        Past Surgical History:   Procedure Laterality Date    COLONOSCOPY  last 2/3/15    Roberto--no polyps--3 year recall    COLONOSCOPY N/A 3/5/2021    COLONOSCOPY/BMI 19 performed by Lidia Sanchez MD at [facility] N/A 2/12/2018    COLONOSCOPY / BMI=21 performed by Kat Lang MD at [address]  2/12/2018         COLONOSCOPY THRU STOMA,JEFF RMVKEVON W/SNARE  3/5/2021         COLOSTOMY  5/11/16    CT RETROPERITONEAL PERC DRAIN  5/13/2021    CT RETROPERITONEAL PERC DRAIN 5/13/2021 [facility] CT SCAN    CT RETROPERITONEAL PERC DRAIN  5/20/2021    CT RETROPERITONEAL PERC DRAIN 5/20/2021 [facility] CT SCAN    GI  4/2016    Sacral nerve stimlator lead trial (unsucessful) and removal    HERNIA REPAIR      and Colostomy in May    HERNIA REPAIR  3/2015, 5/2016    IR ABSCESS EVAL THRU EXISTING CATH  11/24/2021    IR ABSCESS EVAL THRU EXISTING CATH  6/4/2021    IR ABSCESS EVAL THRU EXISTING CATH  5/20/2021    IR NEPHROSTOGRAM EXISTING ACCESS  12/10/2021    IR NEPHROSTOGRAM EXISTING ACCESS  10/4/2021    IR NEPHROSTOMY EXCHANGE CATHETER  12/7/2021    IR NEPHROSTOMY EXCHANGE CATHETER  11/3/2021    IR NEPHROSTOMY EXCHANGE CATHETER  10/28/2021    IR NEPHROSTOMY PERCUTANEOUS LEFT  9/16/2021    IR NEPHROSTOMY PERCUTANEOUS LEFT  9/16/2021    IR NEPHROSTOMY PERCUTANEOUS LEFT 9/16/2021 [facility]    IR REPLCE T CVC WO PORT SAME ACC  5/30/2019    IR TUBE CHANGE  8/5/2021    IR TUBE CHANGE  7/8/2021    IR TUBE CHANGE  6/14/2021    IR TUBE CHANGE  5/27/2021    IR TUNNELED CATHETER PLACEMENT GREATER THAN 5 YEARS  5/20/2019    IR TUNNELED CATHETER PLACEMENT GREATER THAN 5 YEARS  5/20/2019    IR TUNNELED CATHETER PLACEMENT GREATER THAN 5 YEARS 5/20/2019 [facility]    OTHER SURGICAL HISTORY Bilateral     cataracts  2017    OTHER SURGICAL HISTORY  10/7/2013    Roberto---endorectal us    POLYPECTOMY      TOTAL COLECTOMY  11/2013    Roberto--lap LAR with coloproctostomy, mobilization splenic flexure, diverting loop ileostomy    TOTAL COLECTOMY Right 8/2014    for leak    VASCULAR SURGERY Left 4/14    single lumen port/subsequently removed        Family History   Problem Relation Age of Onset    Cancer Mother         colon and ovarian    Cancer Brother         prostate    Alcohol Abuse Brother     Cancer Maternal Grandmother         bone    Alcohol Abuse Father     Stroke Paternal Grandfather     Alcohol Abuse Sister            Social Connections:     Frequency of Communication with Friends and Family: Not on file    Frequency of Social Gatherings with Friends and Family: Not on file    Attends Rastafari Services: Not on file    Active Member of Clubs or Organizations: Not on file    Attends Club or Organization Meetings: Not on file    Marital Status: Not on file        No Known Allergies     Vitals signs and nursing note reviewed. Patient Vitals for the past 4 hrs:   Temp Pulse Resp BP SpO2   06/21/22 0728 -- (!) 101 22 108/61 --   06/21/22 0713 -- (!) 101 25 115/63 --   06/21/22 0658 -- 100 23 127/67 --   06/21/22 0643 -- 100 20 134/69 100 %   06/21/22 0613 -- 98 23 133/63 97 %   06/21/22 0558 -- 95 24 104/60 92 %   06/21/22 0528 -- (!) 0 22 (!) 149/86 96 %   06/21/22 0514 99 °F (37.2 °C) (!) 103 19 (!) 146/93 95 %          Physical Exam  Vitals and nursing note reviewed. Constitutional:       General: He is awake. Appearance: Normal appearance. He is underweight. He is ill-appearing. HENT:      Head: Normocephalic. Right Ear: External ear normal.      Left Ear: External ear normal.      Nose: Nose normal. No congestion. Mouth/Throat:      Mouth: Mucous membranes are moist.      Pharynx: Oropharynx is clear. Eyes:      Extraocular Movements: Extraocular movements intact. Conjunctiva/sclera: Conjunctivae normal.      Pupils: Pupils are equal, round, and reactive to light. Cardiovascular:      Rate and Rhythm: Normal rate and regular rhythm. Pulses: Normal pulses. Heart sounds: Normal heart sounds. No murmur heard. Pulmonary:      Effort: Pulmonary effort is normal. No respiratory distress. Breath sounds: Normal breath sounds. Abdominal:      General: Bowel sounds are normal. There is no distension. Palpations: Abdomen is soft. Tenderness: There is no abdominal tenderness. Musculoskeletal:         General: Normal range of motion. Cervical back: Normal range of motion and neck supple. No rigidity. Skin:     General: Skin is warm and dry. Capillary Refill: Capillary refill takes less than 2 seconds. Neurological:      General: No focal deficit present. Cranial Nerves: No cranial nerve deficit. Motor: No weakness. Psychiatric:         Mood and Affect: Mood normal.         Behavior: Behavior normal.         Thought Content: Thought content normal.         Judgment: Judgment normal.          Procedures    Labs Reviewed   LACTIC ACID - Abnormal; Notable for the following components:       Result Value    Lactic Acid, Plasma 2.9 (*)     All other components within normal limits   CBC WITH AUTO DIFFERENTIAL - Abnormal; Notable for the following components:    WBC 3.6 (*)     Hemoglobin 13.3 (*)     Seg Neutrophils 93 (*)     Lymphocytes 5 (*)     Monocytes 1 (*)     Absolute Lymph # 0.2 (*)     Absolute Mono # 0.0 (*)     All other components within normal limits   COMPREHENSIVE METABOLIC PANEL - Abnormal; Notable for the following components:    Sodium 131 (*)     BUN 46 (*)     CREATININE 2.00 (*)     GFR  42 (*)     GFR Non- 35 (*)     All other components within normal limits   URINALYSIS - Abnormal; Notable for the following components:    Protein,  (*)     Blood, Urine LARGE (*)     Leukocyte Esterase, Urine LARGE (*)     BACTERIA, URINE 4+ (*)     All other components within normal limits   CULTURE, BLOOD 1   CULTURE, BLOOD 1   RAPID INFLUENZA A/B ANTIGENS   COVID-19, RAPID   CULTURE, URINE   PROCALCITONIN   PROTEIN / CREATININE RATIO, URINE   CREATININE, RANDOM URINE   SODIUM, URINE, RANDOM        XR CHEST PORTABLE   Final Result   No evidence of an acute intrathoracic process. US RETROPERITONEAL LIMITED    (Results Pending)              Nydia Coma Scale  Eye Opening: Spontaneous  Best Verbal Response: Oriented  Best Motor Response: Obeys commands  [place] Coma Scale Score: 15                     WA Assessment  BP: 108/61  Heart Rate: (!) 101                       Voice dictation software was used during the making of this note. This software is not perfect and grammatical and other typographical errors may be present.   This note has not been completely proofread for errors.        Paulino Mariano MD  06/21/22 2997

## 2022-06-21 NOTE — ACP (ADVANCE CARE PLANNING)
Advanced Care Planning (Initial Encounter)      Name: Tg Nolasco            Age: 64 y.o. Sex: male  : 1945    MRN:     [id number]    Date of ACP Conversation: 22    Brittney Shaffer Conducted with: Patient with Decision Making Capacity      Patient is AAOx3 and has adequate capacity to make medical decisions. In the event that he is no longer able to make medical decision, he/she would like his sister Negar Das) to make medical decisions. Discussed the current conditions, workup/management plans as well as prognosis. The patient has been informed and is fully aware of the sufficient risks of the active diagnosis and risk for further deterioration due to the underlying condition. In the event of cardiopulmonary arrest, patient would like us to perform resuscitation including chest compression and intubation.       Time Spent face to face with patient/family:  16 mins (This is addition to time spent for clinical care)        Code Status: FULL  Designated Health Care Decision Maker: Negar Thiago (sister)      Fabricio Perez MD

## 2022-06-21 NOTE — ED NOTES
TRANSFER - OUT REPORT:    Verbal report given to Bob on Marlon Castro  being transferred to  for routine progression of patient care       Report consisted of patient's Situation, Background, Assessment and   Recommendations(SBAR). Information from the following report(s) ED Encounter Summary was reviewed with the receiving nurse. Lines:   Peripheral IV 06/21/22 Right; Anterior Hand (Active)        Opportunity for questions and clarification was provided.       Patient transported with:  Saintclair Cower, GN  06/21/22 4790

## 2022-06-21 NOTE — CARE COORDINATION
06/21/22 1218   Service Assessment   Patient Orientation Alert and Oriented   Cognition Alert   History Provided By Patient   Primary Caregiver Self   Support Systems Family Members   PCP Verified by CM Yes   Last Visit to PCP Within last 3 months   Prior Functional Level Independent in ADLs/IADLs   Current Functional Level Independent in ADLs/IADLs   Can patient return to prior living arrangement Yes   Ability to make needs known: Good   Family able to assist with home care needs: Yes   Social/Functional History   Lives With Family   Type of Home Condo   Home Layout Multi-level   Home Access Stairs to enter with rails   ADL Assistance Independent   Homemaking Assistance Independent   Ambulation Assistance Independent   Active  Yes   Mode of Transportation Car   Discharge Planning   Living Arrangements Alone     Patient is alert and oriented in all spheres. Lives alone. Independent with ambulation and ADLs. Drives. Confirmed patient has a pcp. Has a supportive sister but she lives in [place]. Patient has a history of HH and rehab. Drives. Patient was engaged in outpatient PT prior to being admitted. CM following for discharge needs.

## 2022-06-21 NOTE — PROGRESS NOTES
TRANSFER - IN REPORT:    Verbal report received from pawel(name) on Candida Madrid  being received from er(unit) for routine progression of patient care      Report consisted of patients Situation, Background, Assessment and   Recommendations(SBAR). Information from the following report(s) ED SBAR was reviewed with the receiving nurse. Opportunity for questions and clarification was provided.       Awaiting arrival, report  To Silvana ALAS

## 2022-06-21 NOTE — PROGRESS NOTES
Pt arrived to room 820 via stretcher from ED. Alert and oriented times 4 at this time. On RA. No s/sx of distress noted. John in place and draining brown urine. Colostomy in place with bag. Dual skin assessment done with Y[facility] RN. Abrasion noted to upper back and left shin. No pressure injuries noted. Denies any pain at this time. Pt oriented to room and surroundings. Call light system explained and placed in reach of patient. Encouraged to call for assistance as needed. Will continue to monitor.

## 2022-06-21 NOTE — PROGRESS NOTES
Pt resting in bed awake. NS infusing at 100ml/hr. On RA. No s/sx of distress noted. Denies any pain. Call light within reach. Will continue to monitor.

## 2022-06-21 NOTE — ED NOTES
PT BIBEMS from home for evaluation of decreased urinary output thru his urinary catheter. Pt denies any pain at this time. Pt reports having intermittent bilateral leg aching, denies n/v, SHOB, or CP.       Katrina Mariano RN  06/21/22 6578

## 2022-06-21 NOTE — H&P
Hospitalist History and Physical   Admit Date:  2022  5:15 AM   Name:  Ryan Nolasco   Age:  64 y.o. Sex:  male  :  1945   MRN:  [id number]   Room:  [facility]/    Presenting Complaint: Urinary Catheter and Urinary Retention     Reason(s) for Admission: Sepsis with acute organ dysfunction    History of Present Illness:   Fabien Ang is a 64 y.o. male with medical history of rectal cancer s/p sigmoidectomy with colostomy, MDD, urinary retention with chronic carr who presented due to decreased performance urinary catheter. Noticed having dark cloudy puslike urine his urinary catheter since yesterday afternoon. Patient also reports feeling severely weak overnight and unable to get out of bed. Reports feeling chilled without any recorded temperature at home. Denies any pains, shortness of breath, abdominal pain, nausea, vomiting. Patient reports that he was able to work out earlier in the day but became extremely weak at night time. In the emergency room, patient was tachycardic, tachypneic but otherwise hemodynamically stable. Laboratory work-up shows WBC 3.6, hemoglobin 13.3, fritz 131, BUN 46 and creatinine of 2.00. UA shows large leukocyte esterase, turbid urine, 4+ bacteria, 100 WBC. Chest x-ray without any acute intrathoracic process. EKG with normal sinus rhythm with rate of 93. Review of Systems: 10 systems reviewed and negative except as noted in HPI. Assessment & Plan:   Sepsis without acute organ dysfunction  UTI due to chronic indwelling carr catheter  KEVAN due to UTI  Meets criteria for sepsis with tachycardia, tachypnea, leukopenia. Due to urinary tract infection with UA showing 4+ bacteria and over 100 WBC.   Cr of 2.00 on admission with prior creatinine of 1.3 on 2022.  - start on ceftriaxone  - follow up UCx and BCx  - exchange carr catheter as patient states that it has not been changed in ~1 month  - IVF x 1 day and reassess    MDD: resume home medications HTN: resume carvedilol  Hypothyroidism: resume home thyroxine    Diet: ADULT DIET; Regular  VTE ppx: lovenox  Code status: Full Code    Hospital Problems:  Principal Problem:    Sepsis with acute organ dysfunction ([facility] [address]  Active Problems:    Mild episode of recurrent major depressive disorder (HCC)    Rectal cancer ([facility] [address]    Hypothyroidism    Severe protein-calorie malnutrition ([facility] [address]    Colostomy care ([facility] [address]    Urinary tract infection associated with indwelling urethral catheter ([facility] [address]  Resolved Problems:    * No resolved hospital problems.  *       Past History:     Past Medical History:   Diagnosis Date    Acute deep vein thrombosis (DVT) of non-extremity vein 5/20/2019    Family history of malignant neoplasm of gastrointestinal tract     mother colon/ovarian cancer 67    Fecal incontinence     LAR syndrome    John catheter in place 2022    patient stated- \"catheter due to them nicking his bladder and it won't heal\"    Former cigarette smoker     History of rectal cancer     Hypothyroid     stable w/med    Infection and inflammatory reaction due to other internal prosthetic devices, implants and grafts, subsequent encounter 2017    abd wound/mesh colostomy    Insomnia     takes meds    Major depression     Osteoarthritis, hand     Personal history of colonic polyps 9/2013    x 1    Personal history of malignant neoplasm of rectum, rectosigmoid junction, 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    surgery and radiation    Psychiatric disorder     anxiety     Past Surgical History:   Procedure Laterality Date    COLONOSCOPY  last 2/3/15    Roberto--no polyps--3 year recall    COLONOSCOPY N/A 3/5/2021    COLONOSCOPY/BMI 19 performed by Gabriela Johnson MD at [address] [facility] N/A 2/12/2018    COLONOSCOPY / BMI=21 performed by Forrest Hyde MD at [address]  2/12/2018         COLONOSCOPY THRU STOMA,LESN RMVL W/SNARE  3/5/2021         COLOSTOMY  5/11/16    CT RETROPERITONEAL PERC DRAIN  2021    CT RETROPERITONEAL PERC DRAIN 2021 [facility] SC[facility]    CT RETROPERITONEAL PERC DRAIN  2021    CT RETROPERITONEAL PERC DRAIN 2021 [facility] CT SCAN    GI  2016    Sacral nerve stimlator lead trial (unsucessful) and removal    HERNIA REPAIR      and Colostomy in May    HERNIA REPAIR  3/2015, 2016    IR ABSCESS EVAL THRU EXISTING CATH  2021    IR ABSCESS EVAL THRU EXISTING CATH  2021    IR ABSCESS EVAL THRU EXISTING CATH  2021    IR NEPHROSTOGRAM EXISTING ACCESS  12/10/2021    IR NEPHROSTOGRAM EXISTING ACCESS  10/4/2021    IR NEPHROSTOMY EXCHANGE CATHETER  2021    IR NEPHROSTOMY EXCHANGE CATHETER  11/3/2021    IR NEPHROSTOMY EXCHANGE CATHETER  10/28/2021    IR NEPHROSTOMY PERCUTANEOUS LEFT  2021    IR NEPHROSTOMY PERCUTANEOUS LEFT  2021    IR NEPHROSTOMY PERCUTANEOUS LEFT 2021 [facility]    IR REPLCE T CVC WO PORT SAME ACC  2019    IR TUBE CHANGE  2021    IR TUBE CHANGE  2021    IR TUBE CHANGE  2021    IR TUBE CHANGE  2021    IR TUNNELED CATHETER PLACEMENT GREATER THAN 5 YEARS  2019    IR TUNNELED CATHETER PLACEMENT GREATER THAN 5 YEARS  2019    IR TUNNELED CATHETER PLACEMENT GREATER THAN 5 YEARS 2019 [facility]    OTHER SURGICAL HISTORY Bilateral     cataracts  2017    OTHER SURGICAL HISTORY  10/7/2013    Roberto---endorectal us    POLYPECTOMY      TOTAL COLECTOMY  2013    Roberto--lap LAR with coloproctostomy, mobilization splenic flexure, diverting loop ileostomy    TOTAL COLECTOMY Right 2014    for leak    VASCULAR SURGERY Left     single lumen port/subsequently removed      No Known Allergies   Social History     Tobacco Use    Smoking status: Former Smoker     Packs/day: 1.00     Quit date: 1963     Years since quittin.6    Smokeless tobacco: Never Used   Substance Use Topics    Alcohol use: Not Currently Alcohol/week: 11.7 standard drinks      Family History   Problem Relation Age of Onset    Cancer Mother         colon and ovarian    Cancer Brother         prostate    Alcohol Abuse Brother     Cancer Maternal Grandmother         bone    Alcohol Abuse Father     Stroke Paternal Grandfather     Alcohol Abuse Sister       Family history reviewed and negative except as noted above.       Immunization History   Administered Date(s) Administered    COVID-19, Pfizer Purple top, DILUTE for use, 12+ yrs, 30mcg/0.3mL dose 02/15/2021, 03/08/2021, 10/25/2021    PPD Test 01/28/2014, 08/24/2014, 09/09/2014, 11/19/2015, 05/23/2016, 03/25/2019, 04/23/2019, 09/24/2021, 09/27/2021, 02/24/2022     Prior to Admit Medications:  Current Outpatient Medications   Medication Instructions    acetaminophen (TYLENOL) 500 mg, Oral, EVERY 6 HOURS PRN    amitriptyline (ELAVIL) 100 mg, Oral    ascorbic acid (VITAMIN C) 500 mg, 2 TIMES DAILY    carvedilol (COREG) 6.25 mg, Oral, 2 TIMES DAILY WITH MEALS    Cholecalciferol 2,000 Units, 2 TIMES DAILY    clonazePAM (KLONOPIN) 4 mg, Oral    cyanocobalamin 500 mcg, 2 TIMES DAILY    ibuprofen (ADVIL;MOTRIN) 600 mg, Oral, EVERY 6 HOURS PRN    levothyroxine (SYNTHROID) 125 MCG tablet TAKE ONE TABLET BY MOUTH ONE TIME DAILY BEFORE BREAKFAST    OLANZapine (ZYPREXA) 7.5 mg, Oral         Objective:     Patient Vitals for the past 24 hrs:   Temp Pulse Resp BP SpO2   06/21/22 1602 -- -- -- (!) 109/54 --   06/21/22 1511 97.7 °F (36.5 °C) (!) 101 20 (!) 107/58 94 %   06/21/22 1200 98.3 °F (36.8 °C) 97 20 (!) 106/58 --   06/21/22 0857 -- (!) 102 23 (!) 94/56 96 %   06/21/22 0847 -- (!) 102 23 (!) 99/59 96 %   06/21/22 0827 -- (!) 105 24 114/67 96 %   06/21/22 0817 -- (!) 103 25 121/64 96 %   06/21/22 0757 -- (!) 102 25 110/87 97 %   06/21/22 0747 -- (!) 101 25 108/60 96 %   06/21/22 0728 -- (!) 101 22 108/61 --   06/21/22 0713 -- (!) 101 25 115/63 --   06/21/22 0658 -- 100 23 127/67 --   06/21/22 0643 -- 100 20 134/69 100 %   06/21/22 0613 -- 98 23 133/63 97 %   06/21/22 0558 -- 95 24 104/60 92 %   06/21/22 0528 -- (!) 0 22 (!) 149/86 96 %   06/21/22 0514 99 °F (37.2 °C) (!) 103 19 (!) 146/93 95 %       Oxygen Therapy  SpO2: 94 %  Pulse Oximeter Device Mode: Continuous  O2 Device: None (Room air)    Estimated body mass index is 19.92 kg/m² as calculated from the following:    Height as of this encounter: 5' 8\" (1.727 m). Weight as of this encounter: 131 lb (59.4 kg). Intake/Output Summary (Last 24 hours) at 6/21/2022 1636  Last data filed at 6/21/2022 1523  Gross per 24 hour   Intake 1100.01 ml   Output 2750 ml   Net -1649.99 ml         Physical Exam:    Blood pressure (!) 109/54, pulse (!) 101, temperature 97.7 °F (36.5 °C), temperature source Oral, resp. rate 20, height 5' 8\" (1.727 m), weight 131 lb (59.4 kg), SpO2 94 %. General:    Ill appearing, lethargic, thin. No overt distress  Head:  Normocephalic, atraumatic  Eyes:  Sclerae appear normal.  Pupils equally round. ENT:  Nares appear normal, no drainage. Moist oral mucosa  Neck:  No restricted ROM. Trachea midline   CV:   RRR. No m/r/g. No jugular venous distension. Lungs:   CTAB. No wheezing. Respirations even, unlabored  Abdomen: Bowel sounds present. Soft, nontender, nondistended. :  +carr with brown urine with sediments in carr bag. Extremities: No cyanosis or clubbing. No edema  Skin:     No rashes and normal coloration. Warm and dry. Neuro:  CN II-XII grossly intact. A&Ox3  Psych:  Normal mood and affect.       I have reviewed ordered lab tests and independently visualized imaging below:    Last 24hr Labs:  Recent Results (from the past 24 hour(s))   CBC with Auto Differential    Collection Time: 06/21/22  5:28 AM   Result Value Ref Range    WBC 3.6 (L) 4.3 - 11.1 K/uL    RBC 4.56 4.23 - 5.6 M/uL    Hemoglobin 13.3 (L) 13.6 - 17.2 g/dL    Hematocrit 42.2 41.1 - 50.3 %    MCV 92.5 79.6 - 97.8 FL    MCH 29.2 26.1 - 32.9 PG    MCHC 31.5 31.4 - 35.0 g/dL    RDW 13.5 11.9 - 14.6 %    Platelets 835 590 - 066 K/uL    MPV 9.5 9.4 - 12.3 FL    nRBC 0.00 0.0 - 0.2 K/uL    Differential Type AUTOMATED      Seg Neutrophils 93 (H) 43 - 78 %    Lymphocytes 5 (L) 13 - 44 %    Monocytes 1 (L) 4.0 - 12.0 %    Eosinophils % 1 0.5 - 7.8 %    Basophils 1 0.0 - 2.0 %    Immature Granulocytes 0 0.0 - 5.0 %    Segs Absolute 3.3 1.7 - 8.2 K/UL    Absolute Lymph # 0.2 (L) 0.5 - 4.6 K/UL    Absolute Mono # 0.0 (L) 0.1 - 1.3 K/UL    Absolute Eos # 0.0 0.0 - 0.8 K/UL    Basophils Absolute 0.0 0.0 - 0.2 K/UL    Absolute Immature Granulocyte 0.0 0.0 - 0.5 K/UL   CMP    Collection Time: 06/21/22  5:28 AM   Result Value Ref Range    Sodium 131 (L) 136 - 145 mmol/L    Potassium 4.7 3.5 - 5.1 mmol/L    Chloride 99 98 - 107 mmol/L    CO2 21 21 - 32 mmol/L    Anion Gap 11 7 - 16 mmol/L    Glucose 92 65 - 100 mg/dL    BUN 46 (H) 8 - 23 MG/DL    CREATININE 2.00 (H) 0.8 - 1.5 MG/DL    GFR  42 (L) >60 ml/min/1.73m2    GFR Non- 35 (L) >60 ml/min/1.73m2    Calcium 9.2 8.3 - 10.4 MG/DL    Total Bilirubin 0.6 0.2 - 1.1 MG/DL    ALT 39 12 - 65 U/L    AST 37 15 - 37 U/L    Alk Phosphatase 80 50 - 136 U/L    Total Protein 7.7 6.3 - 8.2 g/dL    Albumin 4.3 3.2 - 4.6 g/dL    Globulin 3.4 2.3 - 3.5 g/dL    Albumin/Globulin Ratio 1.3 1.2 - 3.5     Procalcitonin    Collection Time: 06/21/22  5:28 AM   Result Value Ref Range    Procalcitonin 0.18 0.00 - 0.49 ng/mL   Lactic Acid    Collection Time: 06/21/22  5:42 AM   Result Value Ref Range    Lactic Acid, Plasma 2.9 (H) 0.4 - 2.0 MMOL/L   Rapid influenza A/B antigens    Collection Time: 06/21/22  5:44 AM    Specimen: Nasal Washing   Result Value Ref Range    Influenza A Ag Negative NEG      Influenza B Ag Negative NEG      Source Nasopharyngeal     COVID-19, Rapid    Collection Time: 06/21/22  5:44 AM    Specimen: Nasopharyngeal   Result Value Ref Range    Source NASAL      SARS-CoV-2, Rapid Not detected NOTD     Urinalysis w rflx microscopic    Collection Time: 06/21/22  5:47 AM   Result Value Ref Range    Color, UA YELLOW/STRAW      Appearance TURBID      Specific Gravity, UA 1.011 1.001 - 1.023      pH, Urine 6.0 5.0 - 9.0      Protein,  (A) NEG mg/dL    Glucose, UA Negative mg/dL    Ketones, Urine Negative NEG mg/dL    Bilirubin Urine Negative NEG      Blood, Urine LARGE (A) NEG      Urobilinogen, Urine 0.2 0.2 - 1.0 EU/dL    Nitrite, Urine Negative NEG      Leukocyte Esterase, Urine LARGE (A) NEG      WBC, UA >100 0 /hpf    RBC, UA >100 0 /hpf    Epithelial Cells UA 0-3 0 /hpf    BACTERIA, URINE 4+ (H) 0 /hpf    OTHER OBSERVATIONS RESULTS VERIFIED MANUALLY     EKG 12 Lead    Collection Time: 06/21/22  5:48 AM   Result Value Ref Range    Ventricular Rate 93 BPM    Atrial Rate 93 BPM    P-R Interval 194 ms    QRS Duration 106 ms    Q-T Interval 346 ms    QTc Calculation (Bazett) 431 ms    P Axis 65 degrees    R Axis 116 degrees    T Axis 60 degrees    Diagnosis Sinus rhythm    Lactic Acid    Collection Time: 06/21/22 11:24 AM   Result Value Ref Range    Lactic Acid, Plasma 1.4 0.4 - 2.0 MMOL/L   Lactic Acid    Collection Time: 06/21/22 11:59 AM   Result Value Ref Range    Lactic Acid, Plasma 1.5 0.4 - 2.0 MMOL/L       Other Studies:  XR CHEST PORTABLE    Result Date: 6/21/2022  EXAM: Frontal chest HISTORY: Sepsis. TECHNIQUE: Frontal chest. COMPARISON: 5/19/2019 FINDINGS: The cardiac silhouette, mediastinum, and pulmonary vasculature are within normal limits. There is no consolidation, pleural effusion, or pneumothorax. Persistent band of scarring at the left lung base. Stable elevation of the left hemidiaphragm. No significant osseous abnormalities are observed. No evidence of an acute intrathoracic process. US RETROPERITONEAL LIMITED    Result Date: 6/21/2022  US RETROPERITONEAL LIMITED 6/21/2022 9:29 AM HISTORY: Acute kidney injury. Decreased urine output from John catheter.  TECHNIQUE: Real-time sonography of the kidneys, retroperitoneum and bladder was performed with multiple static images obtained. FINDINGS: The right kidney measures 12.6 cm and the left kidney measures 9.2 cm in length. Kidneys are grossly unremarkable without evidence of a cyst or mass. No hydronephrosis or obvious renal calculi. Note that the left kidney is partially obscured due to overlying bowel gas. Possible slight increased echogenicity of the kidneys bilaterally. The aorta and IVC are normal in caliber where imaged. The urinary bladder is decompressed with a John catheter and difficult to assess. There appears to be mild bladder wall thickening with scattered echogenic foci in the bladder possibly debris in the bladder. No hydronephrosis or obvious renal calculi. Possible slight increased echogenicity of the kidneys suggesting underlying chronic renal disease. John catheter decompresses the bladder. Small echogenic foci in the bladder lumen possibly debris in the bladder. Mild bladder wall thickening may be related to incomplete distention. Echocardiogram:  No results found for this or any previous visit.       Meds previously ordered:  Orders Placed This Encounter   Medications    lactated ringers bolus    lactated ringers bolus    cefTRIAXone (ROCEPHIN) 1000 mg IVPB in NS 50ml minibag     Order Specific Question:   Antimicrobial Indications     Answer:   Urinary Tract Infection    sodium chloride flush 0.9 % injection 5-40 mL    sodium chloride flush 0.9 % injection 5-40 mL    0.9 % sodium chloride infusion    OR Linked Order Group     ondansetron (ZOFRAN-ODT) disintegrating tablet 4 mg     ondansetron (ZOFRAN) injection 4 mg    polyethylene glycol (GLYCOLAX) packet 17 g    bisacodyl (DULCOLAX) suppository 10 mg    famotidine (PEPCID) tablet 10 mg    aluminum & magnesium hydroxide-simethicone (MAALOX) 200-200-20 MG/5ML suspension 30 mL    OR Linked Order Group     acetaminophen (TYLENOL) tablet 650 mg     acetaminophen (TYLENOL) suppository 650 mg    enoxaparin Sodium (LOVENOX) injection 30 mg     Order Specific Question:   Indication of Use     Answer:   Prophylaxis-DVT/PE    amitriptyline (ELAVIL) tablet 100 mg    OLANZapine (ZYPREXA) tablet 7.5 mg    levothyroxine (SYNTHROID) tablet 125 mcg    carvedilol (COREG) tablet 6.25 mg    cefTRIAXone (ROCEPHIN) 1000 mg IVPB in NS 50ml minibag     Order Specific Question:   Antimicrobial Indications     Answer:   Urinary Tract Infection     Order Specific Question:   UTI duration of therapy     Answer:   7 days    0.9 % sodium chloride infusion    clonazePAM (KLONOPIN) tablet 4 mg         Signed:  Jennifer Lambert MD    Part of this note may have been written by using a voice dictation software. The note has been proof read but may still contain some grammatical/other typographical errors.

## 2022-06-22 ENCOUNTER — APPOINTMENT (OUTPATIENT)
Dept: PHYSICAL THERAPY | Age: 77
End: 2022-06-22
Payer: MEDICARE

## 2022-06-22 PROBLEM — R78.81 BACTEREMIA DUE TO GRAM-POSITIVE BACTERIA: Status: ACTIVE | Noted: 2022-06-22

## 2022-06-22 PROBLEM — N17.9 AKI (ACUTE KIDNEY INJURY) (HCC): Status: ACTIVE | Noted: 2021-09-15

## 2022-06-22 PROBLEM — R78.81 GRAM-NEGATIVE BACTEREMIA: Status: ACTIVE | Noted: 2022-06-22

## 2022-06-22 LAB
ACCESSION NUMBER, LLC1M: ABNORMAL
ACINETOBACTER CALCOAC BAUMANNII COMPLEX BY PCR: NOT DETECTED
ANION GAP SERPL CALC-SCNC: 6 MMOL/L (ref 7–16)
BACTEROIDES FRAGILIS BY PCR: NOT DETECTED
BASOPHILS # BLD: 0 K/UL (ref 0–0.2)
BASOPHILS NFR BLD: 0 % (ref 0–2)
BIOFIRE TEST COMMENT: ABNORMAL
BLACTX-M ISLT/SPM QL: NOT DETECTED
BLAIMP ISLT/SPM QL: NOT DETECTED
BLAKPC BLD POS QL NAA+NON-PROBE: NOT DETECTED
BLAOXA-48-LIKE ISLT/SPM QL: NOT DETECTED
BLAVIM ISLT/SPM QL: NOT DETECTED
BUN SERPL-MCNC: 37 MG/DL (ref 8–23)
C ALBICANS DNA BLD POS QL NAA+NON-PROBE: NOT DETECTED
C GLABRATA DNA BLD POS QL NAA+NON-PROBE: NOT DETECTED
C KRUSEI DNA BLD POS QL NAA+NON-PROBE: NOT DETECTED
C PARAP DNA BLD POS QL NAA+NON-PROBE: NOT DETECTED
C TROPICLS DNA BLD POS QL NAA+NON-PROBE: NOT DETECTED
CALCIUM SERPL-MCNC: 7.6 MG/DL (ref 8.3–10.4)
CANDIDA AURIS BY PCR: NOT DETECTED
CHLORIDE SERPL-SCNC: 111 MMOL/L (ref 98–107)
CO2 SERPL-SCNC: 24 MMOL/L (ref 21–32)
COLISTIN RES MCR-1 ISLT/SPM QL: NOT DETECTED
CREAT SERPL-MCNC: 1.7 MG/DL (ref 0.8–1.5)
CRYPTOCOCCUS NEOFORMANS/GATTII BY PCR: NOT DETECTED
DIFFERENTIAL METHOD BLD: ABNORMAL
E CLOAC COMP DNA BLD POS NAA+NON-PROBE: NOT DETECTED
E COLI DNA BLD POS QL NAA+NON-PROBE: DETECTED
ENTEROBACTERALES BY PCR: DETECTED
ENTEROCOCCUS FAECALIS BY PCR: DETECTED
ENTEROCOCCUS FAECIUM BY PCR: NOT DETECTED
EOSINOPHIL # BLD: 0 K/UL (ref 0–0.8)
EOSINOPHIL NFR BLD: 0 % (ref 0.5–7.8)
ERYTHROCYTE [DISTWIDTH] IN BLOOD BY AUTOMATED COUNT: 14.2 % (ref 11.9–14.6)
GLUCOSE SERPL-MCNC: 89 MG/DL (ref 65–100)
GP B STREP DNA BLD POS QL NAA+NON-PROBE: NOT DETECTED
HAEM INFLU DNA BLD POS QL NAA+NON-PROBE: NOT DETECTED
HCT VFR BLD AUTO: 34.6 % (ref 41.1–50.3)
HGB BLD-MCNC: 11 G/DL (ref 13.6–17.2)
IMM GRANULOCYTES # BLD AUTO: 0.1 K/UL (ref 0–0.5)
IMM GRANULOCYTES NFR BLD AUTO: 1 % (ref 0–5)
K OXYTOCA DNA BLD POS QL NAA+NON-PROBE: NOT DETECTED
KLEBSIELLA AEROGENES BY PCR: NOT DETECTED
KLEBSIELLA PNEUMONIAE GROUP BY PCR: DETECTED
L MONOCYTOG DNA BLD POS QL NAA+NON-PROBE: NOT DETECTED
LYMPHOCYTES # BLD: 0.1 K/UL (ref 0.5–4.6)
LYMPHOCYTES NFR BLD: 1 % (ref 13–44)
MCH RBC QN AUTO: 29.3 PG (ref 26.1–32.9)
MCHC RBC AUTO-ENTMCNC: 31.8 G/DL (ref 31.4–35)
MCV RBC AUTO: 92 FL (ref 79.6–97.8)
MONOCYTES # BLD: 0.4 K/UL (ref 0.1–1.3)
MONOCYTES NFR BLD: 4 % (ref 4–12)
N MEN DNA BLD POS QL NAA+NON-PROBE: NOT DETECTED
NEUTS SEG # BLD: 9.1 K/UL (ref 1.7–8.2)
NEUTS SEG NFR BLD: 94 % (ref 43–78)
NRBC # BLD: 0 K/UL (ref 0–0.2)
P AERUGINOSA DNA BLD POS NAA+NON-PROBE: NOT DETECTED
PLATELET # BLD AUTO: 124 K/UL (ref 150–450)
PLATELET COMMENT: SLIGHT
PMV BLD AUTO: 10.2 FL (ref 9.4–12.3)
POTASSIUM SERPL-SCNC: 4.1 MMOL/L (ref 3.5–5.1)
PROTEUS SP DNA BLD POS QL NAA+NON-PROBE: NOT DETECTED
RBC # BLD AUTO: 3.76 M/UL (ref 4.23–5.6)
RBC MORPH BLD: ABNORMAL
RESISTANT GENE NDM BY PCR: NOT DETECTED
RESISTANT GENE TARGETS: ABNORMAL
S AUREUS DNA BLD POS QL NAA+NON-PROBE: NOT DETECTED
S AUREUS+CONS DNA BLD POS NAA+NON-PROBE: NOT DETECTED
S MARCESCENS DNA BLD POS NAA+NON-PROBE: NOT DETECTED
S PNEUM DNA BLD POS QL NAA+NON-PROBE: NOT DETECTED
S PYO DNA BLD POS QL NAA+NON-PROBE: NOT DETECTED
SALMONELLA SPECIES BY PCR: NOT DETECTED
SODIUM SERPL-SCNC: 141 MMOL/L (ref 138–145)
STAPHYLOCOCCUS EPIDERMIDIS BY PCR: NOT DETECTED
STAPHYLOCOCCUS LUGDUNENSIS BY PCR: NOT DETECTED
STENOTROPHOMONAS MALTOPHILIA BY PCR: NOT DETECTED
STREPTOCOCCUS DNA BLD POS NAA+NON-PROBE: NOT DETECTED
VANA+VANB BLD POS QL NAA+NON-PROBE: NOT DETECTED
WBC # BLD AUTO: 9.7 K/UL (ref 4.3–11.1)
WBC MORPH BLD: ABNORMAL

## 2022-06-22 PROCEDURE — 97535 SELF CARE MNGMENT TRAINING: CPT

## 2022-06-22 PROCEDURE — 85025 COMPLETE CBC W/AUTO DIFF WBC: CPT

## 2022-06-22 PROCEDURE — 6360000002 HC RX W HCPCS: Performed by: FAMILY MEDICINE

## 2022-06-22 PROCEDURE — 6360000002 HC RX W HCPCS: Performed by: INTERNAL MEDICINE

## 2022-06-22 PROCEDURE — 2580000003 HC RX 258: Performed by: INTERNAL MEDICINE

## 2022-06-22 PROCEDURE — 36415 COLL VENOUS BLD VENIPUNCTURE: CPT

## 2022-06-22 PROCEDURE — 6370000000 HC RX 637 (ALT 250 FOR IP): Performed by: INTERNAL MEDICINE

## 2022-06-22 PROCEDURE — 2580000003 HC RX 258: Performed by: FAMILY MEDICINE

## 2022-06-22 PROCEDURE — 80048 BASIC METABOLIC PNL TOTAL CA: CPT

## 2022-06-22 PROCEDURE — 97165 OT EVAL LOW COMPLEX 30 MIN: CPT

## 2022-06-22 PROCEDURE — 1100000000 HC RM PRIVATE

## 2022-06-22 RX ADMIN — AMITRIPTYLINE HYDROCHLORIDE 100 MG: 50 TABLET, FILM COATED ORAL at 20:48

## 2022-06-22 RX ADMIN — PIPERACILLIN AND TAZOBACTAM 4500 MG: 4; .5 INJECTION, POWDER, LYOPHILIZED, FOR SOLUTION INTRAVENOUS at 15:18

## 2022-06-22 RX ADMIN — LEVOTHYROXINE SODIUM 125 MCG: 0.07 TABLET ORAL at 06:23

## 2022-06-22 RX ADMIN — CARVEDILOL 6.25 MG: 6.25 TABLET, FILM COATED ORAL at 17:53

## 2022-06-22 RX ADMIN — CEFEPIME HYDROCHLORIDE 1000 MG: 1 INJECTION, POWDER, FOR SOLUTION INTRAMUSCULAR; INTRAVENOUS at 09:38

## 2022-06-22 RX ADMIN — ENOXAPARIN SODIUM 30 MG: 100 INJECTION SUBCUTANEOUS at 09:37

## 2022-06-22 RX ADMIN — OLANZAPINE 7.5 MG: 5 TABLET, FILM COATED ORAL at 20:48

## 2022-06-22 RX ADMIN — PIPERACILLIN AND TAZOBACTAM 3375 MG: 3; .375 INJECTION, POWDER, LYOPHILIZED, FOR SOLUTION INTRAVENOUS at 21:59

## 2022-06-22 RX ADMIN — CLONAZEPAM 4 MG: 1 TABLET ORAL at 20:48

## 2022-06-22 RX ADMIN — CARVEDILOL 6.25 MG: 6.25 TABLET, FILM COATED ORAL at 09:37

## 2022-06-22 RX ADMIN — SODIUM CHLORIDE: 9 INJECTION, SOLUTION INTRAVENOUS at 03:55

## 2022-06-22 RX ADMIN — SODIUM CHLORIDE, PRESERVATIVE FREE 10 ML: 5 INJECTION INTRAVENOUS at 20:48

## 2022-06-22 RX ADMIN — SODIUM CHLORIDE, PRESERVATIVE FREE 10 ML: 5 INJECTION INTRAVENOUS at 09:39

## 2022-06-22 ASSESSMENT — PAIN SCALES - GENERAL
PAINLEVEL_OUTOF10: 0
PAINLEVEL_OUTOF10: 0

## 2022-06-22 NOTE — PROGRESS NOTES
Pt resting in bed with eyes closed. Respirations present, even, unlabored. John in place and draining. Denies any pain. Call light within reach. Will continue to monitor.

## 2022-06-22 NOTE — PROGRESS NOTES
SBAR report received from [employer], RN. Pt resting quietly in bed; A&O x4. Respirations even and unlabored on room air. Pt denies pain and reports no further needs at this time. No signs of distress noted. Colostomy and John in place. NS currently infusing at 100 mL/h. Safety measures are in place.

## 2022-06-22 NOTE — PROGRESS NOTES
Hospitalist Progress Note   Admit Date:  2022  5:15 AM   Name:  Dale Nolasco   Age:  64 y.o. Sex:  male  :  1945   MRN:  [id number]   Room:  820/01    Presenting Complaint: Urinary Catheter and Urinary Retention     Reason(s) for Admission: Dehydration [E86.0]  SIRS (systemic inflammatory response syndrome) ([facility]) [R65.10]  Urinary tract infection associated with indwelling urethral catheter, initial encounter ([facility] [P02.644K, N39.0]  Sepsis with acute organ dysfunction, due to unspecified organism [facility]) [A41.9]     Hospital Course & Interval History:     Please refer to the admission H&P for details of presentation. In summary, Leo Rome is a 64 y.o. male with medical history of rectal cancer s/p sigmoidectomy with colostomy, MDD, urinary retention with chronic carr who was admitted for sepsis with acute organ dysfunction, KEVAN and UTI. Blood cultures with gram-positive cocci's as well as gram-negative rods, urine culture with gram-negative rods. Subjective/24 hr Events (22) : Patient is seen and examined at bedside. No acute events reported overnight by nursing staff. Patient remains afebrile overnight. No acute complaints this morning. Tolerating p.o. diet. Patient denies fever, chills, chest pains, shortness of breath, n/v, abdominal pain. Patient states that he would like to get up and walk around as he does not want to get weaker. Review of Systems: 10 point review of systems is otherwise negative with the exception of the elements mentioned above. Assessment & Plan:   Sepsis with acute organ dysfunction  UTI due to chronic indwelling carr catheter  KEVAN due to UTI  Meets criteria for sepsis with tachycardia, tachypnea, leukopenia. Due to urinary tract infection with UA showing 4+ bacteria and over 100 WBC.   Cr of 2.00 on admission with prior creatinine of 1.3 on 2022.  22: Urine culture growing gram-negative rods, blood culture with gram-positive cocci as well as gram-negative rods. - Will change antibiotics to Zosyn pending final speciation and sensitivity  - follow up UCx and BCx  - repeat BCx in the AM.  If remains positive, may need echocardiogram     KEVAN   Creatinine of 2.0 on admission with prior creatinine of 1.3-1.4 in February 2022. Creatinine has trended down to 1.7 today but still not at baseline.  -Treatment of sepsis and UTI with antibiotics as above. MDD: resume home medications   HTN: resume carvedilol  Hypothyroidism: resume home thyroxine     Diet:  ADULT DIET; Regular  DVT PPx: Lovenox  Code status: Full Code      I have reviewed and ordered clinical lab tests and independently visualized images, tracing. I spent 32 minutes of time caring for this patient at bedside or nearby, more than 50 percent of which was spent on coordination of care and/or counseling regarding the disease process, treatment options, and treatment plan. Hospital Problems:  Principal Problem:    Sepsis with acute organ dysfunction ([address]  Active Problems:    Mild episode of recurrent major depressive disorder (HCC)    Rectal cancer (HCC)    Hypothyroidism    Severe protein-calorie malnutrition ([address]    Colostomy care ([facility]    Urinary tract infection associated with indwelling urethral catheter ([facility]  Resolved Problems:    * No resolved hospital problems.  *      Objective:     Patient Vitals for the past 24 hrs:   Temp Pulse Resp BP SpO2   06/22/22 1148 97.2 °F (36.2 °C) 78 17 110/65 97 %   06/22/22 0744 97.9 °F (36.6 °C) 88 17 116/72 93 %   06/22/22 0321 99.5 °F (37.5 °C) 90 19 (!) 111/56 91 %   06/21/22 2222 98.2 °F (36.8 °C) 83 18 (!) 109/92 95 %   06/21/22 1931 98.1 °F (36.7 °C) 90 19 109/60 95 %   06/21/22 1602 -- -- -- (!) 109/54 --   06/21/22 1511 97.7 °F (36.5 °C) (!) 101 20 (!) 107/58 94 %       Oxygen Therapy  SpO2: 97 %  Pulse Oximeter Device Mode: Continuous  O2 Device: None (Room air)    Estimated body mass index is 19.92 kg/m² as calculated from the following:    Height as of this encounter: 5' 8\" (1.727 m). Weight as of this encounter: 131 lb (59.4 kg). Intake/Output Summary (Last 24 hours) at 6/22/2022 1352  Last data filed at 6/22/2022 0630  Gross per 24 hour   Intake --   Output 2125 ml   Net -2125 ml         Physical Exam:   Blood pressure 110/65, pulse 78, temperature 97.2 °F (36.2 °C), resp. rate 17, height 5' 8\" (1.727 m), weight 131 lb (59.4 kg), SpO2 97 %. General:          Ill appearing, lethargic, thin. No overt distress  Head:               Normocephalic, atraumatic  Eyes:               Sclerae appear normal.  Pupils equally round. ENT:                Nares appear normal, no drainage. Moist oral mucosa  Neck:               No restricted ROM. Trachea midline   CV:                  RRR. No m/r/g. No jugular venous distension. Lungs:             CTAB. No wheezing. Respirations even, unlabored  Abdomen: Bowel sounds present. Soft, nontender, nondistended. :                  +carr with brown urine with sediments in carr bag. Extremities:     No cyanosis or clubbing. No edema  Skin:                No rashes and normal coloration. Warm and dry. Neuro:             CN II-XII grossly intact. A&Ox3  Psych:             Normal mood and affect.       I have reviewed ordered lab tests and independently visualized imaging below:    Recent Labs:  Recent Results (from the past 48 hour(s))   CBC with Auto Differential    Collection Time: 06/21/22  5:28 AM   Result Value Ref Range    WBC 3.6 (L) 4.3 - 11.1 K/uL    RBC 4.56 4.23 - 5.6 M/uL    Hemoglobin 13.3 (L) 13.6 - 17.2 g/dL    Hematocrit 42.2 41.1 - 50.3 %    MCV 92.5 79.6 - 97.8 FL    MCH 29.2 26.1 - 32.9 PG    MCHC 31.5 31.4 - 35.0 g/dL    RDW 13.5 11.9 - 14.6 %    Platelets 491 128 - 466 K/uL    MPV 9.5 9.4 - 12.3 FL    nRBC 0.00 0.0 - 0.2 K/uL    Differential Type AUTOMATED      Seg Neutrophils 93 (H) 43 - 78 %    Lymphocytes 5 (L) 13 - 44 %    Monocytes 1 (L) 4.0 - 12.0 %    Eosinophils % 1 0.5 - 7.8 %    Basophils 1 0.0 - 2.0 %    Immature Granulocytes 0 0.0 - 5.0 %    Segs Absolute 3.3 1.7 - 8.2 K/UL    Absolute Lymph # 0.2 (L) 0.5 - 4.6 K/UL    Absolute Mono # 0.0 (L) 0.1 - 1.3 K/UL    Absolute Eos # 0.0 0.0 - 0.8 K/UL    Basophils Absolute 0.0 0.0 - 0.2 K/UL    Absolute Immature Granulocyte 0.0 0.0 - 0.5 K/UL   CMP    Collection Time: 06/21/22  5:28 AM   Result Value Ref Range    Sodium 131 (L) 136 - 145 mmol/L    Potassium 4.7 3.5 - 5.1 mmol/L    Chloride 99 98 - 107 mmol/L    CO2 21 21 - 32 mmol/L    Anion Gap 11 7 - 16 mmol/L    Glucose 92 65 - 100 mg/dL    BUN 46 (H) 8 - 23 MG/DL    CREATININE 2.00 (H) 0.8 - 1.5 MG/DL    GFR  42 (L) >60 ml/min/1.73m2    GFR Non- 35 (L) >60 ml/min/1.73m2    Calcium 9.2 8.3 - 10.4 MG/DL    Total Bilirubin 0.6 0.2 - 1.1 MG/DL    ALT 39 12 - 65 U/L    AST 37 15 - 37 U/L    Alk Phosphatase 80 50 - 136 U/L    Total Protein 7.7 6.3 - 8.2 g/dL    Albumin 4.3 3.2 - 4.6 g/dL    Globulin 3.4 2.3 - 3.5 g/dL    Albumin/Globulin Ratio 1.3 1.2 - 3.5     Procalcitonin    Collection Time: 06/21/22  5:28 AM   Result Value Ref Range    Procalcitonin 0.18 0.00 - 0.49 ng/mL   Culture, Blood 1    Collection Time: 06/21/22  5:42 AM    Specimen: Blood   Result Value Ref Range    Special Requests RIGHT  HAND        Gram stain GRAM NEGATIVE RODS      Gram stain GRAM POSITIVE COCCI      Gram stain AEROBIC BOTTLE POSITIVE      Gram stain        CRITICAL RESULT NOT CALLED DUE TO PREVIOUS NOTIFICATION OF CRITICAL RESULT WITHIN THE LAST 24 HOURS.     Culture GRAM NEGATIVE RODS (A)      Culture CULTURE IN PROGRESS,FURTHER UPDATES TO FOLLOW     Lactic Acid    Collection Time: 06/21/22  5:42 AM   Result Value Ref Range    Lactic Acid, Plasma 2.9 (H) 0.4 - 2.0 MMOL/L   Rapid influenza A/B antigens    Collection Time: 06/21/22  5:44 AM    Specimen: Nasal Washing   Result Value Ref Range    Influenza A Ag Negative NEG Influenza B Ag Negative NEG      Source Nasopharyngeal     COVID-19, Rapid    Collection Time: 06/21/22  5:44 AM    Specimen: Nasopharyngeal   Result Value Ref Range    Source NASAL      SARS-CoV-2, Rapid Not detected NOTD     Urinalysis w rflx microscopic    Collection Time: 06/21/22  5:47 AM   Result Value Ref Range    Color, UA YELLOW/STRAW      Appearance TURBID      Specific Gravity, UA 1.011 1.001 - 1.023      pH, Urine 6.0 5.0 - 9.0      Protein,  (A) NEG mg/dL    Glucose, UA Negative mg/dL    Ketones, Urine Negative NEG mg/dL    Bilirubin Urine Negative NEG      Blood, Urine LARGE (A) NEG      Urobilinogen, Urine 0.2 0.2 - 1.0 EU/dL    Nitrite, Urine Negative NEG      Leukocyte Esterase, Urine LARGE (A) NEG      WBC, UA >100 0 /hpf    RBC, UA >100 0 /hpf    Epithelial Cells UA 0-3 0 /hpf    BACTERIA, URINE 4+ (H) 0 /hpf    OTHER OBSERVATIONS RESULTS VERIFIED MANUALLY     Culture, Urine    Collection Time: 06/21/22  5:47 AM    Specimen: Urine    CATH URINE   Result Value Ref Range    Special Requests NO SPECIAL REQUESTS      Culture (A)       >100,000 COLONIES/mL GRAM NEGATIVE RODS SUBCULTURE IN PROGRESS    Culture >100,000 COLONIES/mL NORMAL SKIN SHERRY ISOLATED     EKG 12 Lead    Collection Time: 06/21/22  5:48 AM   Result Value Ref Range    Ventricular Rate 93 BPM    Atrial Rate 93 BPM    P-R Interval 194 ms    QRS Duration 106 ms    Q-T Interval 346 ms    QTc Calculation (Bazett) 431 ms    P Axis 65 degrees    R Axis 116 degrees    T Axis 60 degrees    Diagnosis Sinus rhythm    Culture, Blood 1    Collection Time: 06/21/22  6:24 AM    Specimen: Blood   Result Value Ref Range    Special Requests NO SPECIAL REQUESTS  LEFT ANTECUBITAL        Gram stain GRAM NEGATIVE RODS      Gram stain GRAM POSITIVE COCCI      Gram stain AEROBIC AND ANAEROBIC BOTTLES      Gram stain        RESULTS VERIFIED, PHONED TO AND READ BACK BY FRACISCO MCCORD RN AT 4744 ON [id number] MT    Culture GRAM NEGATIVE RODS (A)      Culture CULTURE IN PROGRESS,FURTHER UPDATES TO FOLLOW      Culture        Refer to Blood Culture ID Panel Accession [id number]   Culture, Blood, PCR ID Panel    Collection Time: 06/21/22  6:24 AM    Specimen: Blood   Result Value Ref Range    ACCESSION NUMBER, [id number]     Enterococcus faecalis by PCR Detected (A) NOTDET      Enterococcus faecium by PCR NOT DETECTED NOTDET      Listeria monocytogenes by PCR NOT DETECTED NOTDET      STAPHYLOCOCCUS NOT DETECTED NOTDET      Staphylococcus Aureus NOT DETECTED NOTDET      Staphylococcus epidermidis by PCR NOT DETECTED NOTDET      Staphylococcus lugdunensis by PCR NOT DETECTED NOTDET      STREPTOCOCCUS NOT DETECTED NOTDET      Streptococcus agalactiae (Group B) NOT DETECTED NOTDET      Strep pneumoniae NOT DETECTED NOTDET      Strep pyogenes,(Grp. A) NOT DETECTED NOTDET      Acinetobacter calcoac baumannii complex by PCR NOT DETECTED NOTDET      Bacteroides fragilis by PCR NOT DETECTED NOTDET      Enterobacteriaceae by PCR Detected (A) NOTDET      Enterobacter cloacae complex by PCR NOT DETECTED NOTDET      Escherichia Coli Detected (A) NOTDET      Klebsiella aerogenes by PCR NOT DETECTED NOTDET      Klebsiella oxytoca by PCR NOT DETECTED NOTDET      Klebsiella pneumoniae group by PCR Detected (A) NOTDET      Proteus by PCR NOT DETECTED NOTDET      Salmonella species by PCR NOT DETECTED NOTDET      Serratia marcescens by PCR NOT DETECTED NOTDET      Haemophilus Influenzae by PCR NOT DETECTED NOTDET      Neisseria meningitidis by PCR NOT DETECTED NOTDET      Pseudomonas aeruginosa NOT DETECTED NOTDET      Stenotrophomonas maltophilia by PCR NOT DETECTED NOTDET      Candida albicans by PCR NOT DETECTED NOTDET      Candida auris by PCR NOT DETECTED NOTDET      Candida glabrata NOT DETECTED NOTDET      Candida krusei by PCR NOT DETECTED NOTDET      Candida parapsilosis by PCR NOT DETECTED NOTDET      Candida tropicalis by PCR NOT DETECTED NOTDET      Cryptococcus neoformans/gattii by PCR NOT DETECTED NOTDET      Resistant gene targets          Resistant gene ctx-m by PCR NOT DETECTED NOTDET      Resistant gene imp by PCR NOT DETECTED NOTDET      KPC (CARBAPENEM RESISTANCE GENE) NOT DETECTED NOTDET      Colistin Resistance mcr-1 gene by PCR NOT DETECTED NOTDET      Resistant gene ndm by PCR NOT DETECTED NOTDET      Resistant gene oxa-48-like by pcr NOT DETECTED NOTDET      Vancomycin resistance gene NOT DETECTED NOTDET      Resistant gene vim by PCR NOT DETECTED NOTDET      [employer] test comment        False positive results may rarely occur.  Correlate with clinical,epidemiologic, and other laboratory findings   Lactic Acid    Collection Time: 06/21/22 11:24 AM   Result Value Ref Range    Lactic Acid, Plasma 1.4 0.4 - 2.0 MMOL/L   Lactic Acid    Collection Time: 06/21/22 11:59 AM   Result Value Ref Range    Lactic Acid, Plasma 1.5 0.4 - 2.0 MMOL/L   Basic Metabolic Panel w/ Reflex to MG    Collection Time: 06/22/22  4:15 AM   Result Value Ref Range    Sodium 141 138 - 145 mmol/L    Potassium 4.1 3.5 - 5.1 mmol/L    Chloride 111 (H) 98 - 107 mmol/L    CO2 24 21 - 32 mmol/L    Anion Gap 6 (L) 7 - 16 mmol/L    Glucose 89 65 - 100 mg/dL    BUN 37 (H) 8 - 23 MG/DL    CREATININE 1.70 (H) 0.8 - 1.5 MG/DL    GFR African American 50 (L) >60 ml/min/1.73m2    GFR Non- 42 (L) >60 ml/min/1.73m2    Calcium 7.6 (L) 8.3 - 10.4 MG/DL   CBC with Auto Differential    Collection Time: 06/22/22 10:09 AM   Result Value Ref Range    WBC 9.7 4.3 - 11.1 K/uL    RBC 3.76 (L) 4.23 - 5.6 M/uL    Hemoglobin 11.0 (L) 13.6 - 17.2 g/dL    Hematocrit 34.6 (L) 41.1 - 50.3 %    MCV 92.0 79.6 - 97.8 FL    MCH 29.3 26.1 - 32.9 PG    MCHC 31.8 31.4 - 35.0 g/dL    RDW 14.2 11.9 - 14.6 %    Platelets 896 (L) 200 - 450 K/uL    MPV 10.2 9.4 - 12.3 FL    nRBC 0.00 0.0 - 0.2 K/uL    Seg Neutrophils 94 (H) 43 - 78 %    Lymphocytes 1 (L) 13 - 44 %    Monocytes 4 4.0 - 12.0 %    Eosinophils % 0 (L) 0.5 - 7.8 %    Basophils 0 0.0 - 2.0 %    Immature Granulocytes 1 0.0 - 5.0 %    Segs Absolute 9.1 (H) 1.7 - 8.2 K/UL    Absolute Lymph # 0.1 (L) 0.5 - 4.6 K/UL    Absolute Mono # 0.4 0.1 - 1.3 K/UL    Absolute Eos # 0.0 0.0 - 0.8 K/UL    Basophils Absolute 0.0 0.0 - 0.2 K/UL    Absolute Immature Granulocyte 0.1 0.0 - 0.5 K/UL    RBC Comment NORMOCYTIC/NORMOCHROMIC      WBC Comment Result Confirmed By Smear      Platelet Comment SLIGHT      Differential Type AUTOMATED         Other Studies:  US RETROPERITONEAL LIMITED   Final Result   No hydronephrosis or obvious renal calculi. Possible slight   increased echogenicity of the kidneys suggesting underlying chronic renal   disease. John catheter decompresses the bladder. Small echogenic foci in the   bladder lumen possibly debris in the bladder. Mild bladder wall thickening may   be related to incomplete distention. XR CHEST PORTABLE   Final Result   No evidence of an acute intrathoracic process.              Current Meds:  Current Facility-Administered Medications   Medication Dose Route Frequency    sodium chloride flush 0.9 % injection 5-40 mL  5-40 mL IntraVENous 2 times per day    sodium chloride flush 0.9 % injection 5-40 mL  5-40 mL IntraVENous PRN    0.9 % sodium chloride infusion   IntraVENous PRN    ondansetron (ZOFRAN-ODT) disintegrating tablet 4 mg  4 mg Oral Q8H PRN    Or    ondansetron (ZOFRAN) injection 4 mg  4 mg IntraVENous Q6H PRN    polyethylene glycol (GLYCOLAX) packet 17 g  17 g Oral Daily PRN    bisacodyl (DULCOLAX) suppository 10 mg  10 mg Rectal Daily PRN    famotidine (PEPCID) tablet 10 mg  10 mg Oral Daily PRN    aluminum & magnesium hydroxide-simethicone (MAALOX) 200-200-20 MG/5ML suspension 30 mL  30 mL Oral Q6H PRN    acetaminophen (TYLENOL) tablet 650 mg  650 mg Oral Q6H PRN    Or    acetaminophen (TYLENOL) suppository 650 mg  650 mg Rectal Q6H PRN    enoxaparin Sodium (LOVENOX) injection 30 mg  30 mg SubCUTAneous Daily    amitriptyline (ELAVIL) tablet 100 mg  100 mg Oral Nightly    OLANZapine (ZYPREXA) tablet 7.5 mg  7.5 mg Oral Nightly    levothyroxine (SYNTHROID) tablet 125 mcg  125 mcg Oral Daily    carvedilol (COREG) tablet 6.25 mg  6.25 mg Oral BID WC    0.9 % sodium chloride infusion   IntraVENous Continuous    clonazePAM (KLONOPIN) tablet 4 mg  4 mg Oral Nightly    cefepime (MAXIPIME) 1000 mg IVPB minibag in NS  1,000 mg IntraVENous Q12H       Signed:  Flash Martin MD    Part of this note may have been written by using a voice dictation software. The note has been proof read but may still contain some grammatical/other typographical errors.

## 2022-06-22 NOTE — PROGRESS NOTES
Pt resting quietly in bed. Respirations even and unlabored on room air. Pt denies pain and reports no further needs at this time. No signs of distress noted. NS currently infusing at 100 mL/h. Colostomy and John in place. Safety measures are in place. Will continue to monitor and give report to oncoming RN.

## 2022-06-22 NOTE — PROGRESS NOTES
Alerted of blood culture growing Gram negative rods. Appears patient was admitted with sepsis from UTI. Since patient meet sepsis criteria, will adjust antibiotic coverage to cefepime 2g q8h, and discontinue ceftriaxone.

## 2022-06-22 NOTE — PROGRESS NOTES
OCCUPATIONAL THERAPY Initial Assessment, Daily Note and PM       OT Visit Days: 1  Acknowledge Orders  Time  OT Charge Capture  Rehab Caseload Tracker      Shyam Mejia is a 64 y.o. male   PRIMARY DIAGNOSIS: Sepsis with acute organ dysfunction ([facility]  Dehydration [E86.0]  SIRS (systemic inflammatory response syndrome) ([facility]) [R65.10]  Urinary tract infection associated with indwelling urethral catheter, initial encounter ([facility] [T83.511A, N39.0]  Sepsis without acute organ dysfunction, due to unspecified organism ([facility] [A41.9]       Reason for Referral: Generalized Muscle Weakness (M62.81)  Inpatient: Payor: MEDICARE / Plan: MEDICARE PART A AND B / Product Type: *No Product type* /     ASSESSMENT:     REHAB RECOMMENDATIONS:   Recommendation to date pending progress:  Setting:   Home Health Therapy    Equipment:     To Be Determined     ASSESSMENT:  Mr. Juan Martel is a 64year old admitted for sepsis. Patient states he lives alone in two level [facility]. Patient states that prior he was independent with ADLs, IADLs and functional mobility with no device. Patient is currently completing ADLs with SBA-min A and completing functional transfers with RW CGA. Patient will benefit from skilled OT services in this setting to address deficits noted and to increase independence with ADLs. At discharge, recommend continued skilled OT services with home health.         [address] [facility] [address] [address] AM-[facility] 6 Clicks Daily Activity Inpatient Short Form:    AM-PAC Daily Activity Inpatient   How much help for putting on and taking off regular lower body clothing?: A Little  How much help for Bathing?: A Little  How much help for Toileting?: A Little  How much help for putting on and taking off regular upper body clothing?: A Little  How much help for taking care of personal grooming?: A Little  How much help for eating meals?: None  AM-PAC Inpatient Daily Activity Raw Score: 19  AM-PAC Inpatient ADL T-Scale Score : 40.22  ADL Inpatient Total=Total Assistance, NT=Not Tested    ACTIVITIES OF DAILY LIVING: I Mod I S SBA CGA Min Mod Max Total NT Comments   BASIC ADLs:              Upper Body Bathing  [] [] [] [] [] [] [] [] [] [x]    Lower Body Bathing [] [] [] [] [] [] [] [] [] [x]    Toileting [] [] [] [] [] [] [] [] [] [x]    Upper Body Dressing [] [] [] [] [] [] [] [] [] [x]    Lower Body Dressing [] [] [] [] [] [] [] [] [] [x]    Feeding [] [] [] [] [] [] [] [] [] [x]    Grooming [] [] [] [x] [] [] [] [] [] []    Personal Device Care [] [] [] [] [] [] [] [] [] [x]    Functional Mobility [] [] [] [x] [] [] [] [] [] []    I=Independent, Mod I=Modified Independent, S=Supervision/Setup, SBA=Standby Assistance, CGA=Contact Guard Assistance, Min=Minimal Assistance, Mod=Moderate Assistance, Max=Maximal Assistance, Total=Total Assistance, NT=Not Tested    PLAN:     FREQUENCY/DURATION   OT Plan of Care: 3 times/week for duration of hospital stay or until stated goals are met, whichever comes first.    ACUTE OCCUPATIONAL THERAPY GOALS:   (Developed with and agreed upon by patient and/or caregiver.)  1. Patient will complete lower body dressing with supervision. 2. Patient will compete upper body dressing with supervision. 3. Patient will complete bathing with supervision. 4. Patient will stand x10 mins to complete self care tasks with supervision. 5. Patient will tolerate 25 mins of self care, therapeutic exercise or therapeutic activity to increase endurance needed to improve ability with self care tasks.        PROBLEM LIST:   (Skilled intervention is medically necessary to address:)  Decreased ADL/Functional Activities  Decreased Activity Tolerance  Decreased Balance  Decreased Coordination  Decreased Safety Awareness  Decreased Strength   INTERVENTIONS PLANNED:  (Benefits and precautions of occupational therapy have been discussed with the patient.)  Self Care Training  Therapeutic Activity  Therapeutic Exercise/HEP  Neuromuscular Re-education  Manual Therapy  Education         TREATMENT:     EVALUATION: LOW COMPLEXITY: (Untimed Charge)    TREATMENT:   Self Care (23 minutes): Patient participated in grooming and functional mobility  ADLs in standing with minimal visual, verbal and tactile cueing to increase independence, decrease assistance required and increase activity tolerance. Patient also participated in functional mobility and functional transfer training to increase independence and decrease assistance required. TREATMENT GRID:  N/A    AFTER TREATMENT PRECAUTIONS: Bed, Bed/Chair Locked, Call light within reach and Needs within reach    INTERDISCIPLINARY COLLABORATION:  RN/ PCT and OT/ [place]    EDUCATION:  Education Given To: Patient  Education Provided: Role of Therapy;Plan of Care;ADL Adaptive Strategies; Fall Prevention Strategies  Education Method: Verbal  Education Outcome: Verbalized understanding    TOTAL TREATMENT DURATION AND TIME:  Time In: 1500  Time Out: [phone]  Minutes: 30    Alta Milan OT

## 2022-06-22 NOTE — PROGRESS NOTES
Critical lab from Kenny Ruiz,  with readback and confirmation in [employer] bottle Blood culture with 3 different organisms by PCR. Enterococcus Faecalis, E. Coli and Klebsiella Pneumoniae. Dr. Raymon Gil notified via [facility],  Primary nurse, Kanu Kennedy notified. See new orders.

## 2022-06-23 ENCOUNTER — APPOINTMENT (OUTPATIENT)
Dept: CT IMAGING | Age: 77
DRG: 698 | End: 2022-06-23
Payer: MEDICARE

## 2022-06-23 LAB
ANION GAP SERPL CALC-SCNC: 7 MMOL/L (ref 7–16)
BASOPHILS # BLD: 0 K/UL (ref 0–0.2)
BASOPHILS NFR BLD: 0 % (ref 0–2)
BUN SERPL-MCNC: 25 MG/DL (ref 8–23)
CALCIUM SERPL-MCNC: 8 MG/DL (ref 8.3–10.4)
CHLORIDE SERPL-SCNC: 111 MMOL/L (ref 98–107)
CO2 SERPL-SCNC: 24 MMOL/L (ref 21–32)
CREAT SERPL-MCNC: 1.4 MG/DL (ref 0.8–1.5)
DIFFERENTIAL METHOD BLD: ABNORMAL
EOSINOPHIL # BLD: 0.1 K/UL (ref 0–0.8)
EOSINOPHIL NFR BLD: 1 % (ref 0.5–7.8)
ERYTHROCYTE [DISTWIDTH] IN BLOOD BY AUTOMATED COUNT: 14.3 % (ref 11.9–14.6)
GLUCOSE SERPL-MCNC: 91 MG/DL (ref 65–100)
HCT VFR BLD AUTO: 30.5 % (ref 41.1–50.3)
HGB BLD-MCNC: 9.5 G/DL (ref 13.6–17.2)
IMM GRANULOCYTES # BLD AUTO: 0 K/UL (ref 0–0.5)
IMM GRANULOCYTES NFR BLD AUTO: 1 % (ref 0–5)
LYMPHOCYTES # BLD: 0.2 K/UL (ref 0.5–4.6)
LYMPHOCYTES NFR BLD: 4 % (ref 13–44)
MAGNESIUM SERPL-MCNC: 1.9 MG/DL (ref 1.8–2.4)
MCH RBC QN AUTO: 28.4 PG (ref 26.1–32.9)
MCHC RBC AUTO-ENTMCNC: 31.1 G/DL (ref 31.4–35)
MCV RBC AUTO: 91.3 FL (ref 79.6–97.8)
MONOCYTES # BLD: 0.5 K/UL (ref 0.1–1.3)
MONOCYTES NFR BLD: 8 % (ref 4–12)
NEUTS SEG # BLD: 5.4 K/UL (ref 1.7–8.2)
NEUTS SEG NFR BLD: 86 % (ref 43–78)
NRBC # BLD: 0 K/UL (ref 0–0.2)
PLATELET # BLD AUTO: 113 K/UL (ref 150–450)
PMV BLD AUTO: 11 FL (ref 9.4–12.3)
POTASSIUM SERPL-SCNC: 3.5 MMOL/L (ref 3.5–5.1)
RBC # BLD AUTO: 3.34 M/UL (ref 4.23–5.6)
SODIUM SERPL-SCNC: 142 MMOL/L (ref 136–145)
WBC # BLD AUTO: 6.3 K/UL (ref 4.3–11.1)

## 2022-06-23 PROCEDURE — 36415 COLL VENOUS BLD VENIPUNCTURE: CPT

## 2022-06-23 PROCEDURE — 83735 ASSAY OF MAGNESIUM: CPT

## 2022-06-23 PROCEDURE — 85025 COMPLETE CBC W/AUTO DIFF WBC: CPT

## 2022-06-23 PROCEDURE — 51702 INSERT TEMP BLADDER CATH: CPT

## 2022-06-23 PROCEDURE — 2580000003 HC RX 258: Performed by: INTERNAL MEDICINE

## 2022-06-23 PROCEDURE — 2580000003 HC RX 258: Performed by: NURSE PRACTITIONER

## 2022-06-23 PROCEDURE — 74177 CT ABD & PELVIS W/CONTRAST: CPT

## 2022-06-23 PROCEDURE — 6360000004 HC RX CONTRAST MEDICATION: Performed by: NURSE PRACTITIONER

## 2022-06-23 PROCEDURE — 6370000000 HC RX 637 (ALT 250 FOR IP): Performed by: INTERNAL MEDICINE

## 2022-06-23 PROCEDURE — 6360000002 HC RX W HCPCS: Performed by: INTERNAL MEDICINE

## 2022-06-23 PROCEDURE — 1100000000 HC RM PRIVATE

## 2022-06-23 PROCEDURE — 97161 PT EVAL LOW COMPLEX 20 MIN: CPT

## 2022-06-23 PROCEDURE — 80048 BASIC METABOLIC PNL TOTAL CA: CPT

## 2022-06-23 PROCEDURE — 87040 BLOOD CULTURE FOR BACTERIA: CPT

## 2022-06-23 PROCEDURE — 97530 THERAPEUTIC ACTIVITIES: CPT

## 2022-06-23 RX ORDER — SODIUM CHLORIDE 0.9 % (FLUSH) 0.9 %
10 SYRINGE (ML) INJECTION
Status: COMPLETED | OUTPATIENT
Start: 2022-06-23 | End: 2022-06-23

## 2022-06-23 RX ORDER — 0.9 % SODIUM CHLORIDE 0.9 %
100 INTRAVENOUS SOLUTION INTRAVENOUS
Status: COMPLETED | OUTPATIENT
Start: 2022-06-23 | End: 2022-06-23

## 2022-06-23 RX ORDER — SODIUM CHLORIDE, SODIUM LACTATE, POTASSIUM CHLORIDE, CALCIUM CHLORIDE 600; 310; 30; 20 MG/100ML; MG/100ML; MG/100ML; MG/100ML
INJECTION, SOLUTION INTRAVENOUS CONTINUOUS
Status: DISCONTINUED | OUTPATIENT
Start: 2022-06-23 | End: 2022-06-26

## 2022-06-23 RX ADMIN — CARVEDILOL 6.25 MG: 6.25 TABLET, FILM COATED ORAL at 09:09

## 2022-06-23 RX ADMIN — DIATRIZOATE MEGLUMINE AND DIATRIZOATE SODIUM 15 ML: 660; 100 LIQUID ORAL; RECTAL at 13:12

## 2022-06-23 RX ADMIN — OLANZAPINE 7.5 MG: 5 TABLET, FILM COATED ORAL at 21:11

## 2022-06-23 RX ADMIN — AMITRIPTYLINE HYDROCHLORIDE 100 MG: 50 TABLET, FILM COATED ORAL at 21:11

## 2022-06-23 RX ADMIN — PIPERACILLIN AND TAZOBACTAM 3375 MG: 3; .375 INJECTION, POWDER, LYOPHILIZED, FOR SOLUTION INTRAVENOUS at 06:03

## 2022-06-23 RX ADMIN — LEVOTHYROXINE SODIUM 125 MCG: 0.07 TABLET ORAL at 06:44

## 2022-06-23 RX ADMIN — SODIUM CHLORIDE, PRESERVATIVE FREE 10 ML: 5 INJECTION INTRAVENOUS at 09:11

## 2022-06-23 RX ADMIN — SODIUM CHLORIDE, POTASSIUM CHLORIDE, SODIUM LACTATE AND CALCIUM CHLORIDE: 600; 310; 30; 20 INJECTION, SOLUTION INTRAVENOUS at 16:53

## 2022-06-23 RX ADMIN — SODIUM CHLORIDE, PRESERVATIVE FREE 10 ML: 5 INJECTION INTRAVENOUS at 15:25

## 2022-06-23 RX ADMIN — ACETAMINOPHEN 650 MG: 325 TABLET ORAL at 02:46

## 2022-06-23 RX ADMIN — PIPERACILLIN AND TAZOBACTAM 3375 MG: 3; .375 INJECTION, POWDER, LYOPHILIZED, FOR SOLUTION INTRAVENOUS at 21:11

## 2022-06-23 RX ADMIN — PIPERACILLIN AND TAZOBACTAM 3375 MG: 3; .375 INJECTION, POWDER, LYOPHILIZED, FOR SOLUTION INTRAVENOUS at 13:11

## 2022-06-23 RX ADMIN — CLONAZEPAM 4 MG: 1 TABLET ORAL at 21:10

## 2022-06-23 RX ADMIN — SODIUM CHLORIDE, PRESERVATIVE FREE 10 ML: 5 INJECTION INTRAVENOUS at 21:11

## 2022-06-23 RX ADMIN — SODIUM CHLORIDE 100 ML: 9 INJECTION, SOLUTION INTRAVENOUS at 15:25

## 2022-06-23 RX ADMIN — CARVEDILOL 6.25 MG: 6.25 TABLET, FILM COATED ORAL at 17:34

## 2022-06-23 RX ADMIN — ENOXAPARIN SODIUM 30 MG: 100 INJECTION SUBCUTANEOUS at 09:09

## 2022-06-23 RX ADMIN — IOPAMIDOL 100 ML: 755 INJECTION, SOLUTION INTRAVENOUS at 15:24

## 2022-06-23 ASSESSMENT — PAIN SCALES - GENERAL
PAINLEVEL_OUTOF10: 9
PAINLEVEL_OUTOF10: 0
PAINLEVEL_OUTOF10: 0

## 2022-06-23 ASSESSMENT — PAIN DESCRIPTION - DESCRIPTORS: DESCRIPTORS: ACHING

## 2022-06-23 ASSESSMENT — PAIN DESCRIPTION - LOCATION: LOCATION: ABDOMEN

## 2022-06-23 ASSESSMENT — PAIN DESCRIPTION - ORIENTATION: ORIENTATION: RIGHT;LEFT

## 2022-06-23 NOTE — PROGRESS NOTES
PHYSICAL THERAPY Initial Assessment and AM  (Link to Caseload Tracking: PT Visit Days : 1  Acknowledge Orders  Time In/Out  PT Charge Capture  Rehab Caseload Tracker    Justus Call is a 64 y.o. male   PRIMARY DIAGNOSIS: Sepsis with acute organ dysfunction ([facility]  Dehydration [E86.0]  SIRS (systemic inflammatory response syndrome) ([facility]) [R65.10]  Urinary tract infection associated with indwelling urethral catheter, initial encounter ([facility] [T83.511A, N39.0]  Sepsis without acute organ dysfunction, due to unspecified organism ([facility] [A41.9]       Reason for Referral: Generalized Muscle Weakness (M62.81)  Other lack of cordination (R27.8)  Difficulty in walking, Not elsewhere classified (R26.2)  Other abnormalities of gait and mobility (R26.89)  Inpatient: Payor: MEDICARE / Plan: MEDICARE PART A AND B / Product Type: *No Product type* /     ASSESSMENT:     REHAB RECOMMENDATIONS:   Recommendation to date pending progress:  Setting:   Home Health Therapy    Equipment:     To Be Determined     ASSESSMENT:  Mr. Elsie Schwartz is a pleasant frail elderly male with above diagnosis who demonstrates with decreased transfers, ambulation and mobility below his prior functional baseline. All transfers are currently limited at [facility] x 1  out of bed to sit and stand followed by ambulation with 2 wheel rolling walker for 500 feet with the deficits stated below. His standing balance is good. Yari Risen Comin then returns to room and is seated bedside chair with all essentials in reach. Skilled PT is indicated for this patient's functional mobility deficits.         [employer] AM-PAC 6 Clicks Basic Mobility Inpatient Short Form  AM-PAC Mobility Inpatient   How much difficulty turning over in bed?: A Little  How much difficulty sitting down on / standing up from a chair with arms?: A Little  How much difficulty moving from lying on back to sitting on side of bed?: A Little  How much help from another person moving to and from a bed to a chair?: A Little  How much help from another person needed to walk in hospital room?: A Little  How much help from another person for climbing 3-5 steps with a railing?: A Little  AM-PAC Inpatient Mobility Raw Score : 18  AM-PAC Inpatient T-Scale Score : 43.63  Mobility Inpatient CMS 0-100% Score: 46.58  Mobility Inpatient CMS G-Code Modifier : CK    SUBJECTIVE:   Mr. Leonela Reeves states, \"I am ready to get up and move. \"     Social/Functional Lives With: Family  Type of Home: Condo  Home Layout: Multi-level  Home Access: Stairs to enter with rails  Bathroom Shower/Tub: Tub/Shower unit  ADL Assistance: Independent  Homemaking Assistance: Independent  Ambulation Assistance: Independent  Active : Yes  Mode of Transportation: Car    OBJECTIVE:     PAIN: [place] / O2: Margareth Aranda / Jessica Hager / Adi Choilow:   Pre Treatment:   Pain Assessment: None - Denies Pain  Pain Level: 0      Post Treatment: none Vitals        Oxygen      IV    RESTRICTIONS/PRECAUTIONS:  Restrictions/Precautions: Fall Risk                 GROSS EVALUATION: Intact Impaired (Comments):   AROM [x]     PROM []    Strength [x]     Balance [x]  good    Posture [] Forward Head  Rounded Shoulders  Thoracic Kyphosis   Sensation [x]     Coordination [x]      Tone [x]     Edema [x]    Activity Tolerance [x]      []      COGNITION/  PERCEPTION: Intact Impaired (Comments):   Orientation [x]     Vision [x]     Hearing []     Cognition  [x]       MOBILITY: I Mod I S SBA CGA Min Mod Max Total  NT x2 Comments:   Bed Mobility    Rolling [] [] [] [] [x] [] [] [] [] [] []    Supine to Sit [] [] [] [] [x] [] [] [] [] [] []    Scooting [] [] [] [] [x] [] [] [] [] [] []    Sit to Supine [] [] [] [] [x] [] [] [] [] [] []    Transfers    Sit to Stand [] [] [] [] [x] [] [] [] [] [] []    Bed to Chair [] [] [] [] [x] [] [] [] [] [] []    Stand to Sit [] [] [] [] [x] [] [] [] [] [] []     [] [] [] [] [] [] [] [] [] [] []    I=Independent, Mod I=Modified Independent, S=Supervision, SBA=Standby Assistance, CGA=Contact Guard Assistance,   Min=Minimal Assistance, Mod=Moderate Assistance, Max=Maximal Assistance, Total=Total Assistance, NT=Not Tested    GAIT: I Mod I S SBA CGA Min Mod Max Total  NT x2 Comments:   Level of Assistance [] [] [] [] [x] [] [] [] [] [] []    Distance 500 feet    DME Rolling Walker    Gait Quality Decreased zoe , Decreased step clearance and Decreased step length    Weightbearing Status      Stairs      I=Independent, Mod I=Modified Independent, S=Supervision, SBA=Standby Assistance, CGA=Contact Guard Assistance,   Min=Minimal Assistance, Mod=Moderate Assistance, Max=Maximal Assistance, Total=Total Assistance, NT=Not Tested    PLAN:   ACUTE PHYSICAL THERAPY GOALS:   (Developed with and agreed upon by patient and/or caregiver.)  STG:  (1.)Mr. Nolasco will move from supine to sit and sit to supine , scoot up and down and roll side to side with STAND BY ASSIST within 4 treatment day(s). (2.)Mr. Nolasco will transfer from bed to chair and chair to bed with STAND BY ASSIST using the least restrictive device within 4 treatment day(s). (3.)Mr. Nolasco will ambulate with STAND BY ASSIST for 1000 feet with the least restrictive device within 4 treatment day(s). LTG:  (1.)Mr. Nolasco will move from supine to sit and sit to supine , scoot up and down and roll side to side in bed with MODIFIED INDEPENDENCE within 7 treatment day(s). (2.)Mr. Nolasco will transfer from bed to chair and chair to bed with MODIFIED INDEPENDENCE using the least restrictive device within 7 treatment day(s). (3.)Mr. Nolasco will ambulate with MODIFIED INDEPENDENCE for community distances  with the least restrictive device within 7 treatment day(s).   ________________________________________________________________________________________________    FREQUENCY AND DURATION: 3 times/week for duration of hospital stay or until stated goals are met, whichever comes first.    THERAPY PROGNOSIS: Good    PROBLEM LIST:   (Skilled intervention is medically necessary to address:)  Decreased ADL/Functional Activities  Decreased Activity Tolerance  Decreased AROM/PROM  Decreased Balance  Decreased Cognition  Decreased Coordination  Decreased Gait Ability  Decreased Safety Awareness  Decreased Strength  Decreased Transfer Abilities INTERVENTIONS PLANNED:   (Benefits and precautions of physical therapy have been discussed with the patient.)  Therapeutic Activity  Therapeutic Exercise/HEP  Neuromuscular Re-education  Gait Training       TREATMENT:   EVALUATION: LOW COMPLEXITY: (Untimed Charge)    TREATMENT:   Therapeutic Activity (23 Minutes): Therapeutic activity included Rolling, Supine to Sit, Sit to Supine, Scooting, Lateral Scooting, Transfer Training, Ambulation on level ground, Sitting balance  and Standing balance to improve functional Activity tolerance, Balance, Coordination, Mobility and Strength.     TREATMENT GRID:  N/A    AFTER TREATMENT PRECAUTIONS: Bed/Chair Locked, Call light within reach, Chair, Needs within reach, RN notified and Side rails x3    INTERDISCIPLINARY COLLABORATION:  RN/ PCT and PT/ PTA    EDUCATION: Education Given To: Patient  Education Provided: Role of Therapy  Education Method: Verbal  Barriers to Learning: None  Education Outcome: Continued education needed    TIME IN/OUT:  Time In: 0930  Time Out: 1000  Minutes: [facility], [facility]

## 2022-06-23 NOTE — PROGRESS NOTES
Progress Note    Patient: Tab Pedraza MRN: [id number]  SSN: [id number]    YOB: 1945  Age: 64 y.o. Sex: male      Admit Date: 6/21/2022    LOS: 2 days     Assessment and Plan:   64 y. o. male with medical history of rectal cancer s/p sigmoidectomy with colostomy, MDD, urinary retention with chronic carr who was admitted for sepsis with acute organ dysfunction, KEVAN and UTI    1. Sepsis secondary to polymicrobial bacteremia likely in the setting of UTI  -Continue Zosyn  -Follow blood cultures  -Follow urine cultures  -Renal ultrasound without acute abnormalities  -Infectious disease consultation    2. Acute kidney injury likely prerenal  -IV fluids  -Avoid nephrotoxic agents  -Monitor renal function    3. Hypertension  -Continue carvedilol    4. Hypothyroidism  -Continue levothyroxine    DVT prophylaxis with Lovenox    Subjective:   64 y. o. male with medical history of rectal cancer s/p sigmoidectomy with colostomy, MDD, urinary retention with chronic carr who was admitted for sepsis with acute organ dysfunction, KEVAN and UTI. Patient seen and examined at bedside. This morning the patient feeling a little tired but improved from yesterday. Otherwise denies any abdominal pain, no back pain, no shortness of breath, no cough. Objective:     Vitals:    06/22/22 2243 06/23/22 0323 06/23/22 0724 06/23/22 1109   BP: 128/68 133/64 122/67 111/65   Pulse: 85 79 62 59   Resp: 18 18 18 18   Temp: 98.1 °F (36.7 °C) 98 °F (36.7 °C) 97.7 °F (36.5 °C) 98.2 °F (36.8 °C)   TempSrc: Oral Oral Oral Oral   SpO2: 95% 94% 95% 98%   Weight:       Height:            Intake and Output:  Current Shift: No intake/output data recorded.   Last three shifts: 06/21 1901 - 06/23 0700  In: 3 [P.O.:3]  Out: 4800 [Urine:4800]    ROS  10 ROS negative except from stated on subjective    Physical Exam:   General: Alert, oriented, NAD  HEENT: NC/AT, EOM are intact  Neck: supple, no JVD  Cardiovascular: RRR, S1, S2, no murmurs  Respiratory: Lungs are clear, no wheezes or rales  Abdomen: Soft, NT, ND  Back: No CVA tenderness, no paraspinal tenderness  Extremities: LE without pedal edema, no erythema  Neuro: A&O, CN are intact, no focal deficits  Skin: no rash or ulcers  Psych: good mood and affect    Lab/Data Review:  I have personally reviewed patients laboratory data showing  Recent Results (from the past 24 hour(s))   Basic Metabolic Panel w/ Reflex to MG    Collection Time: 06/23/22  3:45 AM   Result Value Ref Range    Sodium 142 136 - 145 mmol/L    Potassium 3.5 3.5 - 5.1 mmol/L    Chloride 111 (H) 98 - 107 mmol/L    CO2 24 21 - 32 mmol/L    Anion Gap 7 7 - 16 mmol/L    Glucose 91 65 - 100 mg/dL    BUN 25 (H) 8 - 23 MG/DL    CREATININE 1.40 0.8 - 1.5 MG/DL    GFR African American >60 >60 ml/min/1.73m2    GFR Non- 52 (L) >60 ml/min/1.73m2    Calcium 8.0 (L) 8.3 - 10.4 MG/DL   CBC with Auto Differential    Collection Time: 06/23/22  3:45 AM   Result Value Ref Range    WBC 6.3 4.3 - 11.1 K/uL    RBC 3.34 (L) 4.23 - 5.6 M/uL    Hemoglobin 9.5 (L) 13.6 - 17.2 g/dL    Hematocrit 30.5 (L) 41.1 - 50.3 %    MCV 91.3 79.6 - 97.8 FL    MCH 28.4 26.1 - 32.9 PG    MCHC 31.1 (L) 31.4 - 35.0 g/dL    RDW 14.3 11.9 - 14.6 %    Platelets 063 (L) 871 - 450 K/uL    MPV 11.0 9.4 - 12.3 FL    nRBC 0.00 0.0 - 0.2 K/uL    Differential Type AUTOMATED      Seg Neutrophils 86 (H) 43 - 78 %    Lymphocytes 4 (L) 13 - 44 %    Monocytes 8 4.0 - 12.0 %    Eosinophils % 1 0.5 - 7.8 %    Basophils 0 0.0 - 2.0 %    Immature Granulocytes 1 0.0 - 5.0 %    Segs Absolute 5.4 1.7 - 8.2 K/UL    Absolute Lymph # 0.2 (L) 0.5 - 4.6 K/UL    Absolute Mono # 0.5 0.1 - 1.3 K/UL    Absolute Eos # 0.1 0.0 - 0.8 K/UL    Basophils Absolute 0.0 0.0 - 0.2 K/UL    Absolute Immature Granulocyte 0.0 0.0 - 0.5 K/UL   Magnesium    Collection Time: 06/23/22  3:45 AM   Result Value Ref Range    Magnesium 1.9 1.8 - 2.4 mg/dL      [unfilled]     Image:  I have personally reviewed patients imaging showing  US RETROPERITONEAL LIMITED   Final Result   No hydronephrosis or obvious renal calculi. Possible slight   increased echogenicity of the kidneys suggesting underlying chronic renal   disease. John catheter decompresses the bladder. Small echogenic foci in the   bladder lumen possibly debris in the bladder. Mild bladder wall thickening may   be related to incomplete distention. XR CHEST PORTABLE   Final Result   No evidence of an acute intrathoracic process.          CT ABDOMEN PELVIS W IV CONTRAST Additional Contrast? Radiologist Recommendation (IV and oral contrast)    (Results Pending)        Current Facility-Administered Medications   Medication Dose Route Frequency Provider Last Rate Last Admin    lactated ringers infusion   IntraVENous Continuous Roseline Martinez MD        iopamidol (ISOVUE-370) 76 % injection 100 mL  100 mL IntraVENous ONCE PRN Brittny Dubs, APRN - CNP        0.9 % sodium chloride bolus  100 mL IntraVENous ONCE PRN Brittny Dubs, APRN - CNP        diatrizoate meglumine-sodium (GASTROGRAFIN) 66-10 % solution 15 mL  15 mL Oral ONCE PRN Brittny Dubs, APRN - CNP        sodium chloride flush 0.9 % injection 10 mL  10 mL IntraVENous ONCE PRN Brittny Dubs, APRN - CNP        piperacillin-tazobactam (ZOSYN) 3,375 mg in sodium chloride 0.9 % 50 mL IVPB (mini-bag)  3,375 mg IntraVENous Sydney Ivy MD   Stopped at 06/23/22 0910    sodium chloride flush 0.9 % injection 5-40 mL  5-40 mL IntraVENous 2 times per day Minor Jean-Baptiste MD   10 mL at 06/23/22 0911    sodium chloride flush 0.9 % injection 5-40 mL  5-40 mL IntraVENous PRN Minor Jean-Baptiste MD        0.9 % sodium chloride infusion   IntraVENous PRN Minor Jean-Baptiste MD        ondansetron (ZOFRAN-ODT) disintegrating tablet 4 mg  4 mg Oral Q8H PRN Minor Jean-Baptiste MD        Or    ondansetron (ZOFRAN) injection 4 mg  4 mg IntraVENous Q6H PRN Minor Jean-Baptiste MD       [facility] polyethylene glycol (GLYCOLAX) packet 17 g  17 g Oral Daily PRN Nano Dickson MD        bisacodyl (DULCOLAX) suppository 10 mg  10 mg Rectal Daily PRN Nano Dickson MD        famotidine (PEPCID) tablet 10 mg  10 mg Oral Daily PRN Nano Dickson MD        aluminum & magnesium hydroxide-simethicone (MAALOX) 200-200-20 MG/5ML suspension 30 mL  30 mL Oral Q6H PRN Nano Dickson MD        acetaminophen (TYLENOL) tablet 650 mg  650 mg Oral Q6H PRN Nano Dickson MD   650 mg at 06/23/22 0246    Or    acetaminophen (TYLENOL) suppository 650 mg  650 mg Rectal Q6H PRN Nano Dickson MD        enoxaparin Sodium (LOVENOX) injection 30 mg  30 mg SubCUTAneous Daily Nano Dickson MD   30 mg at 06/23/22 6818    amitriptyline (ELAVIL) tablet 100 mg  100 mg Oral Nightly Nano Dickson MD   100 mg at 06/22/22 2048    OLANZapine (ZYPREXA) tablet 7.5 mg  7.5 mg Oral Nightly Nano Dickson MD   7.5 mg at 06/22/22 2048    levothyroxine (SYNTHROID) tablet 125 mcg  125 mcg Oral Daily Nano Dickson MD   125 mcg at 06/23/22 0644    carvedilol (COREG) tablet 6.25 mg  6.25 mg Oral BID WC Nano Dickson MD   6.25 mg at 06/23/22 4811    clonazePAM (KLONOPIN) tablet 4 mg  4 mg Oral Nightly Nano Dickson MD   4 mg at 06/22/22 2048        Hospital problems     Patient Active Problem List   Diagnosis    Fecal incontinence    Dilated intrahepatic bile duct    SBO (small bowel obstruction) (HCC)    Hypoxemia    Dehydration    Weight loss    Rectal cancer ([address]    Bladder injury with open wound into cavity    Hypercalcemia of malignancy    Severe sepsis with acute organ dysfunction ([address]    Attention to ileostomy ([address]    Acute blood loss anemia    Anemia    Abscess    Hypernatremia    On total parenteral nutrition (TPN)    DVT (deep vein thrombosis) in pregnancy    Enterocutaneous fistula    Hypothyroidism    Small bowel obstruction (HCC)    Ileus ([address]    Abdominal wall abscess    Pleural effusion    H/O urinary retention    Severe protein-calorie malnutrition ([address]    Elevated LFTs    Urinary retention    Bilateral hydronephrosis    C. 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)    Mild episode of recurrent major depressive disorder ([address]    Gram-negative bacteremia    Bacteremia due to Gram-positive bacteria        I have reviewed, updated, and verified this note's content and spent 38 minutes of my 42 minutes visit performing counseling and coordination of care regarding medical management.        Signed By: Sahara Estrada MD     June 23, 2022

## 2022-06-23 NOTE — PROGRESS NOTES
completed initial visit with patient. Patient expressed that the days have felt long, but he is coping well with his illness.  provided pastoral presence, prayer and empathetic listening.   Signed by  Ashanti Duran M.Div.

## 2022-06-23 NOTE — CONSULTS
Infectious Disease Consult    Today's Date: 6/23/2022   Admit Date: 6/21/2022    Impression:   · Polymicrobial bacteremia 6/21. · Chronic urinary retention with Jonh catheter, history of recent right urethral stent exchange 5/10/22. Hx of bladder rupture after proctectomy 8/2021  · History of rectal cancer status post sigmoidectomy and radiation 2013  -s/p SB resection, enterotomy repair x 3, repair of cystotomy 4/2019    Plan:   ·  Continue pip/tazo for now. · Check CT A/P for source of polymicrobial infection. · Follow repeat BCs and organism susceptibilities     Anti-infectives:   · Pip/tazo 6/22-    Subjective:   Date of Consultation:  June 23, 2022  Referring Physician: Amira Ramesh     Patient is a 64 y.o. male with medical history for rectal cancer status post sigmoidectomy 2013, DVT,  urinary retention with chronic John catheter who was admitted on June 21 for malfunctioning John and dark urine. In May patient underwent right ureteral stent exchange due to hydronephrosis. He has grown Enterococcus faecalis E. coli, Klebsiella pneumoniae, Candida albicans from his urine before. This admission, patient has been afebrile without leukocytosis. UA was checked and 4+ bacteria, greater than 100 WBCs seen. UC seen with greater than 100 GNRs. blood cultures + for GNR and Enterococcus species growth. PCR showing Enterococcus faecalis, E. coli, Klebsiella pneumoniae. Renal ultrasound without hydronephrosis or stones. Been started on Zosyn. He denies abdominal pain, issues with his colostomy, change in bowel movements, John catheter issues, skin lesions, dental/teeth pain, cough, shortness of breath. Last CT A/P 11/2021--hx of bladder rupture and R hemipelvis collection.      Patient Active Problem List   Diagnosis    Fecal incontinence    Dilated intrahepatic bile duct    SBO (small bowel obstruction) (HCC)    Hypoxemia    Dehydration    Weight loss    [facility]    Bladder injury with open wound into cavity    Hypercalcemia of malignancy    Severe sepsis with acute organ dysfunction (HCC)    Attention to ileostomy (HCC)    Acute blood loss anemia    Anemia    Abscess    Hypernatremia    On total parenteral nutrition (TPN)    DVT (deep vein thrombosis) in pregnancy    Enterocutaneous fistula    Hypothyroidism    Small bowel obstruction (HCC)    Ileus (HCC)    Abdominal wall abscess    Pleural effusion    H/O urinary retention    Severe protein-calorie malnutrition (HCC)    Elevated LFTs    Urinary retention    Bilateral hydronephrosis    C. difficile colitis    Anxiety    Pancreatic duct dilated    Hypomagnesemia    Rectal fistula    Hematuria    Major depression    Colostomy care ([address]    Chronic indwelling John catheter    Acute deep vein thrombosis (DVT) of non-extremity vein    Hypokalemia    Postoperative ileus (HCC)    Bowel incontinence    Constipation    Urinary tract infection associated with indwelling urethral catheter ([address]    KEVAN (acute kidney injury) ([address]    Pelvic abscess in [facility])    Infected prosthetic mesh of abdominal wall (HCC)    Fever    Acute UTI (urinary tract infection)    Pseudopolyposis of colon without complication, unspecified part of colon ([address]    Sepsis with acute organ dysfunction (HCC)    Mild episode of recurrent major depressive disorder ([address]    Gram-negative bacteremia    Bacteremia due to Gram-positive bacteria     Past Medical History:   Diagnosis Date    Acute deep vein thrombosis (DVT) of non-extremity vein 5/20/2019    Family history of malignant neoplasm of gastrointestinal tract     mother colon/ovarian cancer 67    Fecal incontinence     LAR syndrome    John catheter in place 2022    patient stated- \"catheter due to them nicking his bladder and it won't heal\"    Former cigarette smoker     History of rectal cancer     Hypothyroid     stable w/med    Infection and inflammatory reaction due to other internal prosthetic devices, implants and grafts, subsequent encounter     abd wound/mesh colostomy    Insomnia     takes meds    Major depression     Osteoarthritis, hand     Personal history of colonic polyps 9/2013    x 1    Personal history of malignant neoplasm of rectum, rectosigmoid junction, and anus 2013    surgery and radiation    Psychiatric disorder     anxiety      Family History   Problem Relation Age of Onset    Cancer Mother         colon and ovarian    Cancer Brother         prostate    Alcohol Abuse Brother     Cancer Maternal Grandmother         bone    Alcohol Abuse Father     Stroke Paternal Grandfather     Alcohol Abuse Sister       Social History     Tobacco Use    Smoking status: Former Smoker     Packs/day: 1.00     Quit date: 1963     Years since quittin.6    Smokeless tobacco: Never Used   Substance Use Topics    Alcohol use: Not Currently     Alcohol/week: 11.7 standard drinks     Past Surgical History:   Procedure Laterality Date    COLONOSCOPY  last 2/3/15    Roberto--no polyps--3 year recall    COLONOSCOPY N/A 3/5/2021    COLONOSCOPY/BMI 19 performed by Lidia Sanchez MD at [address] N/A 2018    COLONOSCOPY / BMI=21 performed by Brijesh Montes MD at [address]  2018         COLONOSCOPY THRU STOMA,JEFF RMVL W/SNARE  3/5/2021         COLOSTOMY  16    CT RETROPERITONEAL PERC DRAIN  2021    CT RETROPERITONEAL PERC DRAIN 2021 [facility] SCAN    CT RETROPERITONEAL PERC DRAIN  2021    CT RETROPERITONEAL PERC DRAIN 2021 [facility] CT SCAN    GI  2016    Sacral nerve stimlator lead trial (unsucessful) and removal    HERNIA REPAIR      and Colostomy in May    HERNIA REPAIR  3/2015, 2016    IR ABSCESS EVAL THRU EXISTING CATH  2021    IR ABSCESS EVAL THRU EXISTING CATH  2021    IR ABSCESS EVAL THRU EXISTING CATH  2021    IR NEPHROSTOGRAM EXISTING ACCESS  12/10/2021    IR NEPHROSTOGRAM EXISTING ACCESS  10/4/2021    IR NEPHROSTOMY EXCHANGE CATHETER  12/7/2021    IR NEPHROSTOMY EXCHANGE CATHETER  11/3/2021    IR NEPHROSTOMY EXCHANGE CATHETER  10/28/2021    IR NEPHROSTOMY PERCUTANEOUS LEFT  9/16/2021    IR NEPHROSTOMY PERCUTANEOUS LEFT  9/16/2021    IR NEPHROSTOMY PERCUTANEOUS LEFT 9/16/2021 [facility]    IR REPLCE T CVC WO PORT SAME ACC  5/30/2019    IR TUBE CHANGE  8/5/2021    IR TUBE CHANGE  7/8/2021    IR TUBE CHANGE  6/14/2021    IR TUBE CHANGE  5/27/2021    IR TUNNELED CATHETER PLACEMENT GREATER THAN 5 YEARS  5/20/2019    IR TUNNELED CATHETER PLACEMENT GREATER THAN 5 YEARS  5/20/2019    IR TUNNELED CATHETER PLACEMENT GREATER THAN 5 YEARS 5/20/2019 [facility]    OTHER SURGICAL HISTORY Bilateral     cataracts  2017    OTHER SURGICAL HISTORY  10/7/2013    Roberto---endorectal us    POLYPECTOMY      TOTAL COLECTOMY  11/2013    Roberto--lap LAR with coloproctostomy, mobilization splenic flexure, diverting loop ileostomy    TOTAL COLECTOMY Right 8/2014    for leak    VASCULAR SURGERY Left 4/14    single lumen port/subsequently removed      Prior to Admission medications    Medication Sig Start Date End Date Taking?  Authorizing Provider   acetaminophen (TYLENOL) 500 MG tablet Take 500 mg by mouth every 6 hours as needed    Ar Automatic Reconciliation   amitriptyline (ELAVIL) 100 MG tablet Take 100 mg by mouth 6/17/19   Ar Automatic Reconciliation   ascorbic acid (VITAMIN C) 500 MG tablet Take 500 mg by mouth 2 times daily  Patient not taking: Reported on 6/21/2022    Ar Automatic Reconciliation   carvedilol (COREG) 3.125 MG tablet Take 6.25 mg by mouth 2 times daily (with meals) 12/10/21   Ar Automatic Reconciliation   Cholecalciferol 50 MCG (2000 UT) CAPS Take 2,000 Units by mouth 2 times daily  Patient not taking: Reported on 6/21/2022 10/15/18   Ar Automatic Reconciliation   clonazePAM (KLONOPIN) 2 MG tablet Take 4 mg by mouth. 21   Ar Automatic Reconciliation   cyanocobalamin 500 MCG tablet Take 500 mcg by mouth 2 times daily  Patient not taking: Reported on 2022    Ar Automatic Reconciliation   ibuprofen (ADVIL;MOTRIN) 600 MG tablet Take 600 mg by mouth every 6 hours as needed    Ar Automatic Reconciliation   levothyroxine (SYNTHROID) 125 MCG tablet TAKE ONE TABLET BY MOUTH ONE TIME DAILY BEFORE BREAKFAST 12/10/21   Ar Automatic Reconciliation   OLANZapine (ZYPREXA) 2.5 MG tablet Take 7.5 mg by mouth    Ar Automatic Reconciliation       No Known Allergies     Review of Systems:  negative except as noted below       Objective:     Visit Vitals  /67   Pulse 62   Temp 97.7 °F (36.5 °C) (Oral)   Resp 18   Ht 5' 8\" (1.727 m)   Wt 131 lb (59.4 kg)   SpO2 95%   BMI 19.92 kg/m²     Temp (24hrs), Av.9 °F (36.6 °C), Min:97.2 °F (36.2 °C), Max:98.3 °F (36.8 °C)       Lines:  Peripheral IV:       Physical Exam:    General:  Alert, cooperative, thin, appears stated age   Eyes:  Sclera anicteric. Pupils equally round and reactive to light. Mouth/Throat: Mucous membranes normal, oral pharynx clear, poor dentition    Neck: Supple   Lungs:   Clear to auscultation bilaterally, good effort   CV:  Regular rate and rhythm,no murmur, click, rub or gallop   Abdomen:   Soft, non-tender.  bowel sounds normal. non-distended   Extremities: No cyanosis or edema   Skin: Skin color, texture, turgor normal. no acute rash or lesions   Lymph nodes: Cervical and supraclavicular normal   Musculoskeletal: No swelling or deformity   Lines/Devices:  Intact, no erythema, drainage or tenderness   Psych: Alert and oriented, normal mood affect given the setting       Data Review:     CBC:  Recent Labs     22  0528 22  1009 22  0345   WBC 3.6* 9.7 6.3   HGB 13.3* 11.0* 9.5*   HCT 42.2 34.6* 30.5*    124* 113*       BMP:  Recent Labs     22  0528 22  0415 22  0345   BUN 46* 37* 25*   * 141 142   K 4.7 4.1 3.5   CL 99 111* 111*   CO2 21 24 24       LFTS:  Recent Labs     06/21/22  0528   ALT 39       Microbiology:   See hpi    Imaging:   See hpi    Signed By: GERMAN Staley - JORDI     June 23, 2022

## 2022-06-23 NOTE — PROGRESS NOTES
Patient resting quietly in bed, respirations even and un;abored. Patient has been observed during hourly rounds and has slept at long intervals. He will continue to be monitored and assisted as needed and safety measures remain in place-call light in reach and bed in low position with wheels locked. Will prepare to give report to oncoming nurse.

## 2022-06-23 NOTE — PROGRESS NOTES
Bedside report received from night nurse Boris Jennings. Assessment done as noted  Respiration even and unlabored 20/min; denies pain or nausea at present. Ostomy intact and patent draining brown stool. Stoma pink. John intact and aptent draining yellow urine. Door open. Encouraged to call with needs.

## 2022-06-23 NOTE — PROGRESS NOTES
PM assessment done. No changes noted. Respiration even and unlabored 20/min at rest. No s/s of pain noted at present. To CT via stretcher and transport.

## 2022-06-24 LAB
ANION GAP SERPL CALC-SCNC: 7 MMOL/L (ref 7–16)
BACTERIA SPEC CULT: ABNORMAL
BACTERIA SPEC CULT: ABNORMAL
BASOPHILS # BLD: 0 K/UL (ref 0–0.2)
BASOPHILS NFR BLD: 0 % (ref 0–2)
BUN SERPL-MCNC: 18 MG/DL (ref 8–23)
CALCIUM SERPL-MCNC: 8 MG/DL (ref 8.3–10.4)
CHLORIDE SERPL-SCNC: 109 MMOL/L (ref 98–107)
CO2 SERPL-SCNC: 26 MMOL/L (ref 21–32)
CREAT SERPL-MCNC: 1.2 MG/DL (ref 0.8–1.5)
DIFFERENTIAL METHOD BLD: ABNORMAL
EOSINOPHIL # BLD: 0.2 K/UL (ref 0–0.8)
EOSINOPHIL NFR BLD: 3 % (ref 0.5–7.8)
ERYTHROCYTE [DISTWIDTH] IN BLOOD BY AUTOMATED COUNT: 14.1 % (ref 11.9–14.6)
GLUCOSE SERPL-MCNC: 81 MG/DL (ref 65–100)
HCT VFR BLD AUTO: 31.6 % (ref 41.1–50.3)
HGB BLD-MCNC: 9.9 G/DL (ref 13.6–17.2)
IMM GRANULOCYTES # BLD AUTO: 0 K/UL (ref 0–0.5)
IMM GRANULOCYTES NFR BLD AUTO: 0 % (ref 0–5)
LYMPHOCYTES # BLD: 0.3 K/UL (ref 0.5–4.6)
LYMPHOCYTES NFR BLD: 7 % (ref 13–44)
MCH RBC QN AUTO: 28.9 PG (ref 26.1–32.9)
MCHC RBC AUTO-ENTMCNC: 31.3 G/DL (ref 31.4–35)
MCV RBC AUTO: 92.1 FL (ref 79.6–97.8)
MONOCYTES # BLD: 0.5 K/UL (ref 0.1–1.3)
MONOCYTES NFR BLD: 11 % (ref 4–12)
NEUTS SEG # BLD: 3.7 K/UL (ref 1.7–8.2)
NEUTS SEG NFR BLD: 79 % (ref 43–78)
NRBC # BLD: 0 K/UL (ref 0–0.2)
PLATELET # BLD AUTO: 110 K/UL (ref 150–450)
PMV BLD AUTO: 10.2 FL (ref 9.4–12.3)
POTASSIUM SERPL-SCNC: 3.6 MMOL/L (ref 3.5–5.1)
RBC # BLD AUTO: 3.43 M/UL (ref 4.23–5.6)
SERVICE CMNT-IMP: ABNORMAL
SODIUM SERPL-SCNC: 142 MMOL/L (ref 136–145)
WBC # BLD AUTO: 4.7 K/UL (ref 4.3–11.1)

## 2022-06-24 PROCEDURE — 85025 COMPLETE CBC W/AUTO DIFF WBC: CPT

## 2022-06-24 PROCEDURE — 36415 COLL VENOUS BLD VENIPUNCTURE: CPT

## 2022-06-24 PROCEDURE — 6370000000 HC RX 637 (ALT 250 FOR IP): Performed by: INTERNAL MEDICINE

## 2022-06-24 PROCEDURE — 6360000002 HC RX W HCPCS: Performed by: INTERNAL MEDICINE

## 2022-06-24 PROCEDURE — 2580000003 HC RX 258: Performed by: INTERNAL MEDICINE

## 2022-06-24 PROCEDURE — 51702 INSERT TEMP BLADDER CATH: CPT

## 2022-06-24 PROCEDURE — 1100000000 HC RM PRIVATE

## 2022-06-24 PROCEDURE — 80048 BASIC METABOLIC PNL TOTAL CA: CPT

## 2022-06-24 PROCEDURE — 97530 THERAPEUTIC ACTIVITIES: CPT

## 2022-06-24 RX ADMIN — SODIUM CHLORIDE, PRESERVATIVE FREE 10 ML: 5 INJECTION INTRAVENOUS at 09:20

## 2022-06-24 RX ADMIN — ACETAMINOPHEN 650 MG: 325 TABLET ORAL at 03:45

## 2022-06-24 RX ADMIN — PIPERACILLIN AND TAZOBACTAM 3375 MG: 3; .375 INJECTION, POWDER, LYOPHILIZED, FOR SOLUTION INTRAVENOUS at 21:56

## 2022-06-24 RX ADMIN — SODIUM CHLORIDE, POTASSIUM CHLORIDE, SODIUM LACTATE AND CALCIUM CHLORIDE: 600; 310; 30; 20 INJECTION, SOLUTION INTRAVENOUS at 18:28

## 2022-06-24 RX ADMIN — LEVOTHYROXINE SODIUM 125 MCG: 0.07 TABLET ORAL at 06:05

## 2022-06-24 RX ADMIN — OLANZAPINE 7.5 MG: 5 TABLET, FILM COATED ORAL at 21:57

## 2022-06-24 RX ADMIN — PIPERACILLIN AND TAZOBACTAM 3375 MG: 3; .375 INJECTION, POWDER, LYOPHILIZED, FOR SOLUTION INTRAVENOUS at 05:03

## 2022-06-24 RX ADMIN — CARVEDILOL 6.25 MG: 6.25 TABLET, FILM COATED ORAL at 09:20

## 2022-06-24 RX ADMIN — CARVEDILOL 6.25 MG: 6.25 TABLET, FILM COATED ORAL at 17:46

## 2022-06-24 RX ADMIN — AMITRIPTYLINE HYDROCHLORIDE 100 MG: 50 TABLET, FILM COATED ORAL at 21:57

## 2022-06-24 RX ADMIN — PIPERACILLIN AND TAZOBACTAM 3375 MG: 3; .375 INJECTION, POWDER, LYOPHILIZED, FOR SOLUTION INTRAVENOUS at 13:39

## 2022-06-24 RX ADMIN — ENOXAPARIN SODIUM 30 MG: 100 INJECTION SUBCUTANEOUS at 09:18

## 2022-06-24 RX ADMIN — SODIUM CHLORIDE, PRESERVATIVE FREE 10 ML: 5 INJECTION INTRAVENOUS at 21:57

## 2022-06-24 RX ADMIN — CLONAZEPAM 4 MG: 1 TABLET ORAL at 21:56

## 2022-06-24 ASSESSMENT — PAIN SCALES - GENERAL: PAINLEVEL_OUTOF10: 3

## 2022-06-24 NOTE — PROGRESS NOTES
Progress Note    Patient: Michelle Mercado MRN: [id number]  SSN: [id number]    YOB: 1945  Age: 64 y.o. Sex: male      Admit Date: 6/21/2022    LOS: 3 days     Assessment and Plan:   64 y. o. male with medical history of rectal cancer s/p sigmoidectomy with colostomy, MDD, urinary retention with chronic carr who was admitted for sepsis with acute organ dysfunction, KEVAN and UTI    1. Sepsis secondary to polymicrobial bacteremia likely in the setting of UTI  -Continue Zosyn  -Follow blood cultures  -Follow urine cultures  -Renal ultrasound without acute abnormalities  -CT of the abdomen and pelvis without acute findings  -Infectious disease recommendations appreciated    2. Acute kidney injury likely prerenal  -IV fluids  -Avoid nephrotoxic agents  -Monitor renal function    3. Hypertension  -Continue carvedilol    4. Hypothyroidism  -Continue levothyroxine    DVT prophylaxis with Lovenox    Subjective:   64 y. o. male with medical history of rectal cancer s/p sigmoidectomy with colostomy, MDD, urinary retention with chronic carr who was admitted for sepsis with acute organ dysfunction, KEVAN and UTI. Patient seen and examined at bedside. This morning still a little tired. Otherwise denies any chest pain, no abdominal pain, no nausea vomiting or diarrhea. Objective:     Vitals:    06/24/22 0238 06/24/22 0745 06/24/22 0920 06/24/22 1110   BP: 134/73 (!) 150/70 (!) 150/70 126/73   Pulse: 72 67 67 62   Resp: 16 17  17   Temp: 97.3 °F (36.3 °C) 97.7 °F (36.5 °C)  97.9 °F (36.6 °C)   TempSrc:       SpO2: 93% 95%  98%   Weight: 132 lb 3.2 oz (60 kg)      Height:            Intake and Output:  Current Shift: No intake/output data recorded.   Last three shifts: 06/22 1901 - 06/24 0700  In: -   Out: 3450 [Urine:3450]    ROS  10 ROS negative except from stated on subjective    Physical Exam:   General: Alert, oriented, NAD  HEENT: NC/AT, EOM are intact  Neck: supple, no JVD  Cardiovascular: RRR, S1, S2, no murmurs  Respiratory: Lungs are clear, no wheezes or rales  Abdomen: Soft, NT, ND  Back: No CVA tenderness, no paraspinal tenderness  Extremities: LE without pedal edema, no erythema  Neuro: A&O, CN are intact, no focal deficits  Skin: no rash or ulcers  Psych: good mood and affect    Lab/Data Review:  I have personally reviewed patients laboratory data showing  Recent Results (from the past 24 hour(s))   Basic Metabolic Panel w/ Reflex to MG    Collection Time: 06/24/22  4:31 AM   Result Value Ref Range    Sodium 142 136 - 145 mmol/L    Potassium 3.6 3.5 - 5.1 mmol/L    Chloride 109 (H) 98 - 107 mmol/L    CO2 26 21 - 32 mmol/L    Anion Gap 7 7 - 16 mmol/L    Glucose 81 65 - 100 mg/dL    BUN 18 8 - 23 MG/DL    CREATININE 1.20 0.8 - 1.5 MG/DL    GFR African American >60 >60 ml/min/1.73m2    GFR Non- >60 >60 ml/min/1.73m2    Calcium 8.0 (L) 8.3 - 10.4 MG/DL   CBC with Auto Differential    Collection Time: 06/24/22  4:31 AM   Result Value Ref Range    WBC 4.7 4.3 - 11.1 K/uL    RBC 3.43 (L) 4.23 - 5.6 M/uL    Hemoglobin 9.9 (L) 13.6 - 17.2 g/dL    Hematocrit 31.6 (L) 41.1 - 50.3 %    MCV 92.1 79.6 - 97.8 FL    MCH 28.9 26.1 - 32.9 PG    MCHC 31.3 (L) 31.4 - 35.0 g/dL    RDW 14.1 11.9 - 14.6 %    Platelets 934 (L) 076 - 450 K/uL    MPV 10.2 9.4 - 12.3 FL    nRBC 0.00 0.0 - 0.2 K/uL    Differential Type AUTOMATED      Seg Neutrophils 79 (H) 43 - 78 %    Lymphocytes 7 (L) 13 - 44 %    Monocytes 11 4.0 - 12.0 %    Eosinophils % 3 0.5 - 7.8 %    Basophils 0 0.0 - 2.0 %    Immature Granulocytes 0 0.0 - 5.0 %    Segs Absolute 3.7 1.7 - 8.2 K/UL    Absolute Lymph # 0.3 (L) 0.5 - 4.6 K/UL    Absolute Mono # 0.5 0.1 - 1.3 K/UL    Absolute Eos # 0.2 0.0 - 0.8 K/UL    Basophils Absolute 0.0 0.0 - 0.2 K/UL    Absolute Immature Granulocyte 0.0 0.0 - 0.5 K/UL      [unfilled]     Image:  I have personally reviewed patients imaging showing  CT ABDOMEN PELVIS W IV CONTRAST Additional Contrast? Radiologist Recommendation (IV and oral contrast)   Final Result   1. No definite new source for the patient's bacteremia. There is no definite   intra-abdominal abscess or new fluid collection in the pelvis. There is a small   right presacral fluid collection that contains a small pocket of air that   measures roughly 3 cm that has decreased in size from the prior exam.   2. The bladder wall remains thickened but is decompressed primarily by John   catheter. Cystitis remains a consideration. The previous posterior bladder wall   rupture now has an appearance more in keeping with phlegmon/chronic wall   thickening without significant fluid within the rupture. No significant free   fluid in the pelvis at all. 3. Nonspecific gallbladder wall thickening. US RETROPERITONEAL LIMITED   Final Result   No hydronephrosis or obvious renal calculi. Possible slight   increased echogenicity of the kidneys suggesting underlying chronic renal   disease. John catheter decompresses the bladder. Small echogenic foci in the   bladder lumen possibly debris in the bladder. Mild bladder wall thickening may   be related to incomplete distention. XR CHEST PORTABLE   Final Result   No evidence of an acute intrathoracic process.               Current Facility-Administered Medications   Medication Dose Route Frequency Provider Last Rate Last Admin    lactated ringers infusion   IntraVENous Continuous Tammy Blanca MD 50 mL/hr at 06/24/22 0918 Rate Change at 06/24/22 0918    diatrizoate meglumine-sodium (GASTROGRAFIN) 66-10 % solution 15 mL  15 mL Oral ONCE PRN Lum GERMAN Coates - CNP   15 mL at 06/23/22 1312    piperacillin-tazobactam (ZOSYN) 3,375 mg in sodium chloride 0.9 % 50 mL IVPB (mini-bag)  3,375 mg IntraVENous Q8H Hunter Camejo MD 12.5 mL/hr at 06/24/22 1339 3,375 mg at 06/24/22 1339    sodium chloride flush 0.9 % injection 5-40 mL  5-40 mL IntraVENous 2 times per day Hunter Camejo MD   10 mL at 06/24/22 0920    sodium chloride flush 0.9 % injection 5-40 mL  5-40 mL IntraVENous PRN Pilar Mckeon MD        0.9 % sodium chloride infusion   IntraVENous PRN Pilar Mckeon MD        ondansetron (ZOFRAN-ODT) disintegrating tablet 4 mg  4 mg Oral Q8H PRN Pilar Mckeon MD        Or    ondansetron (ZOFRAN) injection 4 mg  4 mg IntraVENous Q6H PRN Pilar Mckeon MD        polyethylene glycol (GLYCOLAX) packet 17 g  17 g Oral Daily PRN Pilar Mckeon MD        bisacodyl (DULCOLAX) suppository 10 mg  10 mg Rectal Daily PRN Pilar Mckeon MD        famotidine (PEPCID) tablet 10 mg  10 mg Oral Daily PRN Pilar Mckeon MD        aluminum & magnesium hydroxide-simethicone (MAALOX) 200-200-20 MG/5ML suspension 30 mL  30 mL Oral Q6H PRN Pilar Mckeon MD        acetaminophen (TYLENOL) tablet 650 mg  650 mg Oral Q6H PRN Pilar Mckeon MD   650 mg at 06/24/22 0345    Or    acetaminophen (TYLENOL) suppository 650 mg  650 mg Rectal Q6H PRN Pilar Mckeon MD        enoxaparin Sodium (LOVENOX) injection 30 mg  30 mg SubCUTAneous Daily Pilar Mckeon MD   30 mg at 06/24/22 6107    amitriptyline (ELAVIL) tablet 100 mg  100 mg Oral Nightly Pilar Mckeon MD   100 mg at 06/23/22 2111    OLANZapine (ZYPREXA) tablet 7.5 mg  7.5 mg Oral Nightly Pilar Mckeon MD   7.5 mg at 06/23/22 2111    levothyroxine (SYNTHROID) tablet 125 mcg  125 mcg Oral Daily Pilar Mckeon MD   125 mcg at 06/24/22 6393    carvedilol (COREG) tablet 6.25 mg  6.25 mg Oral BID WC Pilar Mckeon MD   6.25 mg at 06/24/22 0920    clonazePAM (KLONOPIN) tablet 4 mg  4 mg Oral Nightly Pilar Mckeon MD   4 mg at 06/23/22 2110        Hospital problems     Patient Active Problem List   Diagnosis    Fecal incontinence    Dilated intrahepatic bile duct    SBO (small bowel obstruction) (HCC)    Hypoxemia    Dehydration    Weight loss    Rectal cancer ([address]    Bladder injury with open wound into cavity    Hypercalcemia of malignancy    [facility]    Attention to ileostomy ([address]    Acute blood loss anemia    Anemia    Abscess    Hypernatremia    On total parenteral nutrition (TPN)    DVT (deep vein thrombosis) in pregnancy    Enterocutaneous fistula    Hypothyroidism    Small bowel obstruction (HCC)    Ileus (HCC)    Abdominal wall abscess    Pleural effusion    H/O urinary retention    Severe protein-calorie malnutrition (HCC)    Elevated LFTs    Urinary retention    Bilateral hydronephrosis    C. difficile colitis    Anxiety    Pancreatic duct dilated    Hypomagnesemia    Rectal fistula    Hematuria    Major depression    Colostomy care ([address]    Chronic indwelling John catheter    Acute deep vein thrombosis (DVT) of non-extremity vein    Hypokalemia    Postoperative ileus (HCC)    Bowel incontinence    Constipation    Urinary tract infection associated with indwelling urethral catheter ([address]    KEVAN (acute kidney injury) ([address]    Pelvic abscess in male [facility])    Infected prosthetic mesh of abdominal wall (HCC)    Fever    Acute UTI (urinary tract infection)    Pseudopolyposis of colon without complication, unspecified part of colon ([address]    Sepsis with acute organ dysfunction (HCC)    Mild episode of recurrent major depressive disorder ([address]    Gram-negative bacteremia    Bacteremia due to Gram-positive bacteria        I have reviewed, updated, and verified this note's content and spent 36 minutes of my 40 minutes visit performing counseling and coordination of care regarding medical management.        Signed By: Thais Goldsmith MD     June 24, 2022

## 2022-06-24 NOTE — PROGRESS NOTES
PHYSICAL THERAPY Daily Note  (Link to Caseload Tracking: PT Visit Days : 2  Time In/Out PT Charge Capture  Rehab Caseload Tracker  Orders      Candida Madrid is a 64 y.o. male   PRIMARY DIAGNOSIS: Sepsis with acute organ dysfunction ([facility]  Dehydration [E86.0]  SIRS (systemic inflammatory response syndrome) ([facility] [R65.10]  Urinary tract infection associated with indwelling urethral catheter, initial encounter ([facility] [T83.511A, N39.0]  Sepsis without acute organ dysfunction, due to unspecified organism ([facility] [A41.9]       Inpatient: Payor: Brian Avila / Plan: MEDICARE PART A AND B / Product Type: *No Product type* /     ASSESSMENT:     REHAB RECOMMENDATIONS:   Recommendation to date pending progress:  [address] [place] [address] Therapy    Equipment:     Rolling Walker     ASSESSMENT:  Mr. Maico Wong is making slow progress toward goals. He performed all mobility with SBA today and minimal cueing for gait technique and safety awareness. He does present with decreased awareness of lines. Ambulation with SBA/supervision and WBOS.        SUBJECTIVE:   Mr. Maico Wong states, \"I have 12 steps in my apartment\"     Social/Functional Lives With: Family  Type of Home: Condo  Home Layout: Multi-level  Home Access: Stairs to enter with rails  Bathroom Shower/Tub: Tub/Shower unit  ADL Assistance: Independent  Homemaking Assistance: Independent  Ambulation Assistance: Independent  Active : Yes  Mode of Transportation: Car  OBJECTIVE:     PAIN: Dana Robin / O2: Uriel Edwin / Rozina Miller / Dior Re[employer]s:   Pre Treatment:   Pain Assessment: None - Denies Pain      Post Treatment:0 Vitals        Oxygen    John Catheter and IV    RESTRICTIONS/PRECAUTIONS:        MOBILITY: I Mod I S SBA CGA Min Mod Max Total  NT x2 Comments:   Bed Mobility    Rolling [] [] [] [x] [] [] [] [] [] [] []    Supine to Sit [] [] [] [x] [] [] [] [] [] [] []    Scooting [] [] [] [x] [] [] [] [] [] [] []    Sit to Supine [] [] [] [] [] [] [] [] [] [x] []    Transfers    Sit to Stand [] [] [] [x] [] [] [] [] [] [] []    Bed to Chair [] [] [] [x] [] [] [] [] [] [] []    Stand to Sit [] [] [] [x] [] [] [] [] [] [] []     [] [] [] [] [] [] [] [] [] [] []    I=Independent, Mod I=Modified Independent, S=Supervision, SBA=Standby Assistance, CGA=Contact Guard Assistance,   Min=Minimal Assistance, Mod=Moderate Assistance, Max=Maximal Assistance, Total=Total Assistance, NT=Not Tested    BALANCE: Good Fair+ Fair Fair- Poor NT Comments   Sitting Static [x] [] [] [] [] []    Sitting Dynamic [x] [] [] [] [] []              Standing Static [x] [x] [] [] [] []    Standing Dynamic [] [x] [] [] [] []      GAIT: I Mod I S SBA CGA Min Mod Max Total  NT x2 Comments:   Level of Assistance [] [] [x] [x] [] [] [] [] [] [] []    Distance 500 feet    DME Rolling Walker    Gait Quality Decreased zoe     Weightbearing Status      Stairs      I=Independent, Mod I=Modified Independent, S=Supervision, SBA=Standby Assistance, CGA=Contact Guard Assistance,   Min=Minimal Assistance, Mod=Moderate Assistance, Max=Maximal Assistance, Total=Total Assistance, NT=Not Tested    PLAN:   ACUTE PHYSICAL THERAPY GOALS:   (Developed with and agreed upon by patient and/or caregiver.)  STG:  (1.)Mr. Nolasco will move from supine to sit and sit to supine , scoot up and down and roll side to side with STAND BY ASSIST within 4 treatment day(s). (2.)Mr. Nolasco will transfer from bed to chair and chair to bed with STAND BY ASSIST using the least restrictive device within 4 treatment day(s). (3.)Mr. Nolasco will ambulate with STAND BY ASSIST for 1000 feet with the least restrictive device within 4 treatment day(s).      LTG:  (1.)Mr. Nolasco will move from supine to sit and sit to supine , scoot up and down and roll side to side in bed with MODIFIED INDEPENDENCE within 7 treatment day(s). (2.)Mr. Nolasco will transfer from bed to chair and chair to bed with MODIFIED INDEPENDENCE using the least restrictive device within 7 treatment day(s). (3.)Mr. Nolasco will ambulate with MODIFIED INDEPENDENCE for community distances  with the least restrictive device within 7 treatment day(    FREQUENCY AND DURATION: 3 times/week for duration of hospital stay or until stated goals are met, whichever comes first.    TREATMENT:   TREATMENT:   Therapeutic Activity (24 Minutes): Therapeutic activity included Supine to Sit, Scooting, Transfer Training, Ambulation on level ground, Sitting balance  and Standing balance to improve functional Activity tolerance, Balance, Mobility and Strength.     TREATMENT GRID:  N/A    AFTER TREATMENT PRECAUTIONS: Alarm Activated, Call light within reach, Chair, Needs within reach and RN notified    INTERDISCIPLINARY COLLABORATION:  RN/ PCT and PT/ PTA    EDUCATION:      TIME IN/OUT:  Time In: 4547  Time Out: 1000  Minutes: [address] [place], PTA

## 2022-06-24 NOTE — PROGRESS NOTES
PM assessment done. No changes noted. Respiration even and unlabored 20/min at rest. No s/s of pain noted at present. Encouraged to call with needs. 108

## 2022-06-24 NOTE — PROGRESS NOTES
Bedside report received from night nurse Flakita. Assessment done as noted  Respiration even and unlabored 20/min; denies pain or nausea at present. Colostomy intact and patent. Stoma pink. John intact and patent draining yellow urine. Encouraged to call with needs.

## 2022-06-24 NOTE — PROGRESS NOTES
Infectious Disease Note    Today's Date: 6/24/2022   Admit Date: 6/21/2022    Impression:   · Polymicrobial bacteremia 6/21. CT A/P without IA or renal involvement. Some thickening of bladder. · Chronic urinary retention with John catheter, history of recent right urethral stent exchange 5/10/22. Hx of bladder rupture after proctectomy 8/2021  · History of rectal cancer status post sigmoidectomy and radiation 2013  -s/p SB resection, enterotomy repair x 3, repair of cystotomy 4/2019    Plan:   ·  Continue pip/tazo for now. Enterococcus susceptibilities pending. Duration 14 days. · Follow repeat BCs-need no growth for 48h prior to DC. · Dispo home when above resulted--likely Monday. Anti-infectives:   · Pip/tazo 6/22-    Subjective:   Sitting up in chair, no issues. Updated on abx plans. Patient is a 64 y.o. male with medical history for rectal cancer status post sigmoidectomy 2013, DVT,  urinary retention with chronic John catheter who was admitted on June 21 for malfunctioning John and dark urine. In May patient underwent right ureteral stent exchange due to hydronephrosis. He has grown Enterococcus faecalis E. coli, Klebsiella pneumoniae, Candida albicans from his urine before. This admission, patient has been afebrile without leukocytosis. UA was checked and 4+ bacteria, greater than 100 WBCs seen. UC seen with greater than 100 GNRs. blood cultures + for GNR and Enterococcus species growth. PCR showing Enterococcus faecalis, E. coli, Klebsiella pneumoniae. Renal ultrasound without hydronephrosis or stones. Been started on Zosyn. He denies abdominal pain, issues with his colostomy, change in bowel movements, John catheter issues, skin lesions, dental/teeth pain, cough, shortness of breath. Last CT A/P 11/2021--hx of bladder rupture and R hemipelvis collection.      No Known Allergies     Review of Systems:  negative except as noted below       Objective:     Visit Vitals  BP (!) 150/70 Pulse 67   Temp 97.7 °F (36.5 °C)   Resp 17   Ht 5' 8\" (1.727 m)   Wt 132 lb 3.2 oz (60 kg)   SpO2 95%   BMI 20.10 kg/m²     Temp (24hrs), Av.9 °F (36.6 °C), Min:97.3 °F (36.3 °C), Max:98.4 °F (36.9 °C)       Lines:  Peripheral IV:       Physical Exam:    General:  Alert, cooperative, thin, appears stated age   Eyes:  Sclera anicteric. Pupils equally round and reactive to light. Mouth/Throat: Mucous membranes normal, oral pharynx clear, poor dentition    Neck: Supple   Lungs:   Clear to auscultation bilaterally, good effort   CV:  Regular rate and rhythm,no murmur, click, rub or gallop   Abdomen:   Soft, non-tender. bowel sounds normal. non-distended   Extremities: No cyanosis or edema   Skin: Skin color, texture, turgor normal. no acute rash or lesions   Lymph nodes: Cervical and supraclavicular normal   Musculoskeletal: No swelling or deformity   Lines/Devices:  Intact, no erythema, drainage or tenderness   Psych: Alert and oriented, normal mood affect given the setting       Data Review:     CBC:  Recent Labs     22  1009 22  0345 22  0431   WBC 9.7 6.3 4.7   HGB 11.0* 9.5* 9.9*   HCT 34.6* 30.5* 31.6*   * 113* 110*       BMP:  Recent Labs     22  0415 22  0345 22  0431   BUN 37* 25* 18    142 142   K 4.1 3.5 3.6   * 111* 109*   CO2 24 24 26       LFTS:  No results for input(s): ALT, TP, ALB in the last 72 hours.     Invalid input(s): TBILI, SGOT, AP    Microbiology:   See hpi    Imaging:   See hpi    Signed By: GERMAN Lomax - JORDI     2022

## 2022-06-25 PROCEDURE — 6370000000 HC RX 637 (ALT 250 FOR IP): Performed by: INTERNAL MEDICINE

## 2022-06-25 PROCEDURE — 1100000000 HC RM PRIVATE

## 2022-06-25 PROCEDURE — 2580000003 HC RX 258: Performed by: INTERNAL MEDICINE

## 2022-06-25 PROCEDURE — 6360000002 HC RX W HCPCS: Performed by: INTERNAL MEDICINE

## 2022-06-25 RX ADMIN — LEVOTHYROXINE SODIUM 125 MCG: 0.07 TABLET ORAL at 05:58

## 2022-06-25 RX ADMIN — AMITRIPTYLINE HYDROCHLORIDE 100 MG: 50 TABLET, FILM COATED ORAL at 22:00

## 2022-06-25 RX ADMIN — ENOXAPARIN SODIUM 30 MG: 100 INJECTION SUBCUTANEOUS at 09:29

## 2022-06-25 RX ADMIN — OLANZAPINE 7.5 MG: 5 TABLET, FILM COATED ORAL at 22:00

## 2022-06-25 RX ADMIN — SODIUM CHLORIDE, PRESERVATIVE FREE 10 ML: 5 INJECTION INTRAVENOUS at 09:29

## 2022-06-25 RX ADMIN — CLONAZEPAM 4 MG: 1 TABLET ORAL at 22:00

## 2022-06-25 RX ADMIN — SODIUM CHLORIDE, PRESERVATIVE FREE 10 ML: 5 INJECTION INTRAVENOUS at 21:18

## 2022-06-25 RX ADMIN — CARVEDILOL 6.25 MG: 6.25 TABLET, FILM COATED ORAL at 15:56

## 2022-06-25 RX ADMIN — PIPERACILLIN AND TAZOBACTAM 3375 MG: 3; .375 INJECTION, POWDER, LYOPHILIZED, FOR SOLUTION INTRAVENOUS at 05:58

## 2022-06-25 RX ADMIN — CARVEDILOL 6.25 MG: 6.25 TABLET, FILM COATED ORAL at 09:29

## 2022-06-25 RX ADMIN — PIPERACILLIN AND TAZOBACTAM 3375 MG: 3; .375 INJECTION, POWDER, LYOPHILIZED, FOR SOLUTION INTRAVENOUS at 12:40

## 2022-06-25 RX ADMIN — PIPERACILLIN AND TAZOBACTAM 3375 MG: 3; .375 INJECTION, POWDER, LYOPHILIZED, FOR SOLUTION INTRAVENOUS at 21:18

## 2022-06-25 NOTE — PLAN OF CARE
Problem: Safety - Adult  Goal: Free from fall injury  6/25/2022 1429 by Libra Luna RN  Outcome: Progressing

## 2022-06-25 NOTE — PROGRESS NOTES
Reviewed notes for new spiritual concerns      PATIENT WAS AWAKE    ALERT    RECEPTIVE TO  [facility]    ENCOURAGED    PT SAID HE IS GOING TO REHAB (COOPERATIVE)      Will follow as needed

## 2022-06-25 NOTE — PROGRESS NOTES
Beside shift report received from Siobhan  Patient lying in bed  Respirations present even and unlabored on room air  No signs of distress  Urinary catheter draining yellow urine  No needs expressed at this time  Safety measures in place

## 2022-06-25 NOTE — PROGRESS NOTES
Progress Note    Patient: Hari Escobar MRN: [id number]  SSN: [id number]    YOB: 1945  Age: 64 y.o. Sex: male      Admit Date: 6/21/2022    LOS: 4 days     Assessment and Plan:   64 y. o. male with medical history of rectal cancer s/p sigmoidectomy with colostomy, MDD, urinary retention with chronic carr who was admitted for sepsis with acute organ dysfunction, KEVAN and UTI    1. Sepsis secondary to polymicrobial bacteremia likely in the setting of UTI  -Continue Zosyn  -Follow blood cultures - Klebsiella, E Coli, Enterococcus faecalis  -Renal ultrasound without acute abnormalities  -CT of the abdomen and pelvis without acute findings  -Infectious disease recommendations appreciated    2. Acute kidney injury likely prerenal  -IV fluids  -Avoid nephrotoxic agents  -Monitor renal function    3. Hypertension  -Continue carvedilol    4. Hypothyroidism  -Continue levothyroxine    DVT prophylaxis with Lovenox    Subjective:   64 y. o. male with medical history of rectal cancer s/p sigmoidectomy with colostomy, MDD, urinary retention with chronic carr who was admitted for sepsis with acute organ dysfunction, KEVAN and UTI. Patient seen and examined at bedside. This morning still a little tired. Otherwise denies any chest pain, no abdominal pain, no nausea vomiting or diarrhea.     Objective:     Vitals:    06/24/22 2351 06/25/22 0355 06/25/22 0742 06/25/22 1138   BP: (!) 150/76 (!) 162/83 (!) 164/80 (!) 146/76   Pulse: 75 67 66 61   Resp: 18 20 19 19   Temp: 97.7 °F (36.5 °C) 97.7 °F (36.5 °C) 97.7 °F (36.5 °C) 98.4 °F (36.9 °C)   TempSrc: Oral Oral Oral Oral   SpO2: 92% 93% 92% 94%   Weight:  128 lb (58.1 kg)     Height:            Intake and Output:  Current Shift: 06/25 0701 - 06/25 1900  In: -   Out: 600 [Urine:600]  Last three shifts: 06/23 1901 - 06/25 0700  In: 545 [P.O.:545]  Out: 4100 [Urine:4100]    ROS  10 ROS negative except from stated on subjective    Physical Exam:   General: Alert, oriented, NAD  HEENT: NC/AT, EOM are intact  Neck: supple, no JVD  Cardiovascular: RRR, S1, S2, no murmurs  Respiratory: Lungs are clear, no wheezes or rales  Abdomen: Soft, NT, ND  Back: No CVA tenderness, no paraspinal tenderness  Extremities: LE without pedal edema, no erythema  Neuro: A&O, CN are intact, no focal deficits  Skin: no rash or ulcers  Psych: good mood and affect    Lab/Data Review:  I have personally reviewed patients laboratory data showing  No results found for this or any previous visit (from the past 24 hour(s)). [unfilled]     Image:  I have personally reviewed patients imaging showing  CT ABDOMEN PELVIS W IV CONTRAST Additional Contrast? Radiologist Recommendation (IV and oral contrast)   Final Result   1. No definite new source for the patient's bacteremia. There is no definite   intra-abdominal abscess or new fluid collection in the pelvis. There is a small   right presacral fluid collection that contains a small pocket of air that   measures roughly 3 cm that has decreased in size from the prior exam.   2. The bladder wall remains thickened but is decompressed primarily by John   catheter. Cystitis remains a consideration. The previous posterior bladder wall   rupture now has an appearance more in keeping with phlegmon/chronic wall   thickening without significant fluid within the rupture. No significant free   fluid in the pelvis at all. 3. Nonspecific gallbladder wall thickening. US RETROPERITONEAL LIMITED   Final Result   No hydronephrosis or obvious renal calculi. Possible slight   increased echogenicity of the kidneys suggesting underlying chronic renal   disease. John catheter decompresses the bladder. Small echogenic foci in the   bladder lumen possibly debris in the bladder. Mild bladder wall thickening may   be related to incomplete distention. XR CHEST PORTABLE   Final Result   No evidence of an acute intrathoracic process. Current Facility-Administered Medications   Medication Dose Route Frequency Provider Last Rate Last Admin    lactated ringers infusion   IntraVENous Continuous Zahra Siu MD 50 mL/hr at 06/24/22 1828 New Bag at 06/24/22 1828    diatrizoate meglumine-sodium (GASTROGRAFIN) 66-10 % solution 15 mL  15 mL Oral ONCE PRN [place] Round, APRN - CNP   15 mL at 06/23/22 1312    piperacillin-tazobactam (ZOSYN) 3,375 mg in sodium chloride 0.9 % 50 mL IVPB (mini-bag)  3,375 mg IntraVENous Q8H Eda Long MD 12.5 mL/hr at 06/25/22 1240 3,375 mg at 06/25/22 1240    sodium chloride flush 0.9 % injection 5-40 mL  5-40 mL IntraVENous 2 times per day Eda Long MD   10 mL at 06/25/22 0929    sodium chloride flush 0.9 % injection 5-40 mL  5-40 mL IntraVENous PRN Eda Long MD        0.9 % sodium chloride infusion   IntraVENous PRN Eda Long MD        ondansetron (ZOFRAN-ODT) disintegrating tablet 4 mg  4 mg Oral Q8H PRN Eda Long MD        Or    ondansetron (ZOFRAN) injection 4 mg  4 mg IntraVENous Q6H PRN Eda Long MD        polyethylene glycol (GLYCOLAX) packet 17 g  17 g Oral Daily PRN Eda Long MD        bisacodyl (DULCOLAX) suppository 10 mg  10 mg Rectal Daily PRN Eda Long MD        famotidine (PEPCID) tablet 10 mg  10 mg Oral Daily PRN Eda Long MD        aluminum & magnesium hydroxide-simethicone (MAALOX) 200-200-20 MG/5ML suspension 30 mL  30 mL Oral Q6H PRN Eda Long MD        acetaminophen (TYLENOL) tablet 650 mg  650 mg Oral Q6H PRN Eda Long MD   650 mg at 06/24/22 0345    Or    acetaminophen (TYLENOL) suppository 650 mg  650 mg Rectal Q6H PRN Eda Long MD        enoxaparin Sodium (LOVENOX) injection 30 mg  30 mg SubCUTAneous Daily Eda Long MD   30 mg at 06/25/22 0929    amitriptyline (ELAVIL) tablet 100 mg  100 mg Oral Nightly Eda Long MD   100 mg at 06/24/22 2157    OLANZapine (ZYPREXA) tablet 7.5 mg  7.5 mg Oral Nightly Eda Long MD   7.5 mg at 06/24/22 2157    levothyroxine (SYNTHROID) tablet 125 mcg  125 mcg Oral Daily Shawn Griffiths MD   125 mcg at 06/25/22 0558    carvedilol (COREG) tablet 6.25 mg  6.25 mg Oral BID WC Shawn Griffiths MD   6.25 mg at 06/25/22 1464    clonazePAM (KLONOPIN) tablet 4 mg  4 mg Oral Nightly Shawn Griffiths MD   4 mg at 06/24/22 2156        Hospital problems     Patient Active Problem List   Diagnosis    Fecal incontinence    Dilated intrahepatic bile duct    SBO [facility]small bowel obstruction) [facility]    Hypoxemia    Dehydration    Weight loss    Rectal cancer ([address]    Bladder injury with open wound into cavity    Hypercalcemia of malignancy    Severe sepsis with acute organ dysfunction ([facility]    Attention to ileostomy ([facility]    Acute blood loss anemia    Anemia    Abscess    Hypernatremia    On total parenteral nutrition (TPN)    DVT (deep vein thrombosis) in pregnancy    Enterocutaneous fistula    Hypothyroidism    Small bowel obstruction (HCC)    Ileus (HCC)    Abdominal wall abscess    Pleural effusion    H/O urinary retention    Severe protein-calorie malnutrition ([facility])    Elevated LFTs    Urinary retention    Bilateral hydronephrosis    C. difficile colitis    Anxiety    Pancreatic duct dilated    Hypomagnesemia    Rectal fistula    Hematuria    Major depression    Colostomy care ([facility]    Chronic indwelling John catheter    Acute deep vein thrombosis (DVT) of non-extremity vein    Hypokalemia    Postoperative ileus ([facility]    Bowel incontinence    Constipation    Urinary tract infection associated with indwelling urethral catheter ([address]    KEVAN (acute kidney injury) ([address]    Pelvic abscess in male ([address]    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

## 2022-06-25 NOTE — PROGRESS NOTES
Infectious Disease Chart Review Note    Today's Date: 6/25/2022   Admit Date: 6/21/2022    Impression:   · Polymicrobial bacteremia 6/21. CT A/P without IA or renal involvement. Some thickening of bladder. · Chronic urinary retention with John catheter, history of recent right urethral stent exchange 5/10/22.  Hx of bladder rupture after proctectomy 8/2021  · History of rectal cancer status post sigmoidectomy and radiation 2013  -s/p SB resection, enterotomy repair x 3, repair of cystotomy 4/2019    Plan:   · Continue pip/tazo for now  · Enterococcus susceptibilities show PANS  Duration 14 days   · Follow repeat BCs-need no growth for 48h prior to DC   · Dispo home when above resulted--likely Monday     Anti-infectives:   · Pip/tazo 6/22-    Signed By: GERMAN Monaco - CNP     June 25, 2022

## 2022-06-26 PROCEDURE — 51702 INSERT TEMP BLADDER CATH: CPT

## 2022-06-26 PROCEDURE — 6370000000 HC RX 637 (ALT 250 FOR IP): Performed by: NURSE PRACTITIONER

## 2022-06-26 PROCEDURE — 1100000000 HC RM PRIVATE

## 2022-06-26 PROCEDURE — 6370000000 HC RX 637 (ALT 250 FOR IP): Performed by: INTERNAL MEDICINE

## 2022-06-26 PROCEDURE — 6360000002 HC RX W HCPCS: Performed by: INTERNAL MEDICINE

## 2022-06-26 PROCEDURE — 2580000003 HC RX 258: Performed by: INTERNAL MEDICINE

## 2022-06-26 RX ORDER — SULFAMETHOXAZOLE AND TRIMETHOPRIM 800; 160 MG/1; MG/1
1 TABLET ORAL EVERY 12 HOURS SCHEDULED
Status: DISCONTINUED | OUTPATIENT
Start: 2022-06-26 | End: 2022-06-28 | Stop reason: HOSPADM

## 2022-06-26 RX ORDER — AMOXICILLIN AND CLAVULANATE POTASSIUM 875; 125 MG/1; MG/1
1 TABLET, FILM COATED ORAL EVERY 12 HOURS SCHEDULED
Status: DISCONTINUED | OUTPATIENT
Start: 2022-06-26 | End: 2022-06-28 | Stop reason: HOSPADM

## 2022-06-26 RX ORDER — MORPHINE SULFATE 2 MG/ML
2 INJECTION, SOLUTION INTRAMUSCULAR; INTRAVENOUS ONCE
Status: COMPLETED | OUTPATIENT
Start: 2022-06-26 | End: 2022-06-26

## 2022-06-26 RX ADMIN — SODIUM CHLORIDE, PRESERVATIVE FREE 10 ML: 5 INJECTION INTRAVENOUS at 21:34

## 2022-06-26 RX ADMIN — OLANZAPINE 7.5 MG: 5 TABLET, FILM COATED ORAL at 21:30

## 2022-06-26 RX ADMIN — SULFAMETHOXAZOLE AND TRIMETHOPRIM 1 TABLET: 800; 160 TABLET ORAL at 21:30

## 2022-06-26 RX ADMIN — CARVEDILOL 6.25 MG: 6.25 TABLET, FILM COATED ORAL at 09:36

## 2022-06-26 RX ADMIN — CLONAZEPAM 4 MG: 1 TABLET ORAL at 21:30

## 2022-06-26 RX ADMIN — SODIUM CHLORIDE, PRESERVATIVE FREE 5 ML: 5 INJECTION INTRAVENOUS at 09:37

## 2022-06-26 RX ADMIN — AMOXICILLIN AND CLAVULANATE POTASSIUM 1 TABLET: 875; 125 TABLET, FILM COATED ORAL at 21:30

## 2022-06-26 RX ADMIN — ENOXAPARIN SODIUM 30 MG: 100 INJECTION SUBCUTANEOUS at 09:36

## 2022-06-26 RX ADMIN — AMITRIPTYLINE HYDROCHLORIDE 100 MG: 50 TABLET, FILM COATED ORAL at 21:30

## 2022-06-26 RX ADMIN — CARVEDILOL 6.25 MG: 6.25 TABLET, FILM COATED ORAL at 17:43

## 2022-06-26 RX ADMIN — PIPERACILLIN AND TAZOBACTAM 3375 MG: 3; .375 INJECTION, POWDER, LYOPHILIZED, FOR SOLUTION INTRAVENOUS at 13:23

## 2022-06-26 RX ADMIN — ACETAMINOPHEN 650 MG: 325 TABLET ORAL at 06:24

## 2022-06-26 RX ADMIN — PIPERACILLIN AND TAZOBACTAM 3375 MG: 3; .375 INJECTION, POWDER, LYOPHILIZED, FOR SOLUTION INTRAVENOUS at 05:25

## 2022-06-26 RX ADMIN — LEVOTHYROXINE SODIUM 125 MCG: 0.07 TABLET ORAL at 05:26

## 2022-06-26 RX ADMIN — MORPHINE SULFATE 2 MG: 2 INJECTION, SOLUTION INTRAMUSCULAR; INTRAVENOUS at 09:36

## 2022-06-26 ASSESSMENT — PAIN SCALES - GENERAL
PAINLEVEL_OUTOF10: 0
PAINLEVEL_OUTOF10: 3
PAINLEVEL_OUTOF10: 0
PAINLEVEL_OUTOF10: 0

## 2022-06-26 ASSESSMENT — PAIN DESCRIPTION - LOCATION: LOCATION: LEG

## 2022-06-26 ASSESSMENT — PAIN DESCRIPTION - ORIENTATION: ORIENTATION: LEFT;RIGHT

## 2022-06-26 ASSESSMENT — PAIN DESCRIPTION - DESCRIPTORS: DESCRIPTORS: ACHING

## 2022-06-26 NOTE — PROGRESS NOTES
Infectious Disease Chart Review Note    Today's Date: 6/26/2022   Admit Date: 6/21/2022    Impression:   · Polymicrobial bacteremia 6/21. CT A/P without IA or renal involvement. Some thickening of bladder. · Chronic urinary retention with John catheter, history of recent right urethral stent exchange 5/10/22.  Hx of bladder rupture after proctectomy 8/2021  · History of rectal cancer status post sigmoidectomy and radiation 2013  -s/p SB resection, enterotomy repair x 3, repair of cystotomy 4/2019    Plan:   · Stop pip/tazo   · Transition to PPO Augmentin and Bactrim DS x 14 days of therapy woth EOT 7/7/22  · ID will sign off today    Anti-infectives:   · Pip/tazo 6/22-    Signed By: GERMAN Mcqueen - CNP     June 26, 2022

## 2022-06-26 NOTE — PROGRESS NOTES
Bedside report received from day nurse Dionne Snellen. Care assumed. Assessment done as noted  Respiration even and unlabored 20/min; denies pain or nausea at present. Ostomy intact and patent. Stoma pink. John intact and patent draining yellow urine. Encouraged to call with needs.

## 2022-06-26 NOTE — PROGRESS NOTES
Progress Note    Patient: Maury Noyola MRN: [id number]  SSN: [id number]    YOB: 1945  Age: 64 y.o. Sex: male      Admit Date: 6/21/2022    LOS: 5 days     Assessment and Plan:   64 y. o. male with medical history of rectal cancer s/p sigmoidectomy with colostomy, MDD, urinary retention with chronic carr who was admitted for sepsis with acute organ dysfunction, KEVAN and UTI    1. Sepsis secondary to polymicrobial bacteremia likely in the setting of UTI  -Continue Zosyn  -Follow blood cultures - Klebsiella, E Coli, Enterococcus faecalis  -Renal ultrasound without acute abnormalities  -CT of the abdomen and pelvis without acute findings  -Infectious disease recommendations appreciated    2. Acute kidney injury likely prerenal  -IV fluids  -Avoid nephrotoxic agents  -Monitor renal function    3. Hypertension  -Continue carvedilol    4. Hypothyroidism  -Continue levothyroxine    DVT prophylaxis with Lovenox     Subjective:   64 y. o. male with medical history of rectal cancer s/p sigmoidectomy with colostomy, MDD, urinary retention with chronic carr who was admitted for sepsis with acute organ dysfunction, KEVAN and UTI. Patient seen and examined at bedside. This morning still a little tired. Otherwise denies any chest pain, no abdominal pain, no nausea vomiting or diarrhea.     Objective:     Vitals:    06/25/22 2247 06/26/22 0252 06/26/22 0740 06/26/22 0936   BP: (!) 162/75 133/79 (!) 177/94 (!) 177/94   Pulse: 55 69 74 74   Resp: 16 20 20    Temp: 98.1 °F (36.7 °C) 97.5 °F (36.4 °C) 97.7 °F (36.5 °C)    TempSrc:   Oral    SpO2: 96% 92% 94%    Weight:  126 lb 12.8 oz (57.5 kg)     Height:            Intake and Output:  Current Shift: 06/26 0701 - 06/26 1900  In: -   Out: 675 [Urine:675]  Last three shifts: 06/24 1901 - 06/26 0700  In: 100 [P.O.:100]  Out: 5150 [Urine:5150]    ROS  10 ROS negative except from stated on subjective    Physical Exam:   General: Alert, oriented, NAD  HEENT: NC/AT, EOM are intact  Neck: supple, no JVD  Cardiovascular: RRR, S1, S2, no murmurs  Respiratory: Lungs are clear, no wheezes or rales  Abdomen: Soft, NT, ND  Back: No CVA tenderness, no paraspinal tenderness  Extremities: LE without pedal edema, no erythema  Neuro: A&O, CN are intact, no focal deficits  Skin: no rash or ulcers  Psych: good mood and affect    Lab/Data Review:  I have personally reviewed patients laboratory data showing  No results found for this or any previous visit (from the past 24 hour(s)). [unfilled]     Image:  I have personally reviewed patients imaging showing  CT ABDOMEN PELVIS W IV CONTRAST Additional Contrast? Radiologist Recommendation (IV and oral contrast)   Final Result   1. No definite new source for the patient's bacteremia. There is no definite   intra-abdominal abscess or new fluid collection in the pelvis. There is a small   right presacral fluid collection that contains a small pocket of air that   measures roughly 3 cm that has decreased in size from the prior exam.   2. The bladder wall remains thickened but is decompressed primarily by John   catheter. Cystitis remains a consideration. The previous posterior bladder wall   rupture now has an appearance more in keeping with phlegmon/chronic wall   thickening without significant fluid within the rupture. No significant free   fluid in the pelvis at all. 3. Nonspecific gallbladder wall thickening. US RETROPERITONEAL LIMITED   Final Result   No hydronephrosis or obvious renal calculi. Possible slight   increased echogenicity of the kidneys suggesting underlying chronic renal   disease. John catheter decompresses the bladder. Small echogenic foci in the   bladder lumen possibly debris in the bladder. Mild bladder wall thickening may   be related to incomplete distention. XR CHEST PORTABLE   Final Result   No evidence of an acute intrathoracic process.          FL MODIFIED BARIUM SWALLOW W VIDEO (Results Pending)        Current Facility-Administered Medications   Medication Dose Route Frequency Provider Last Rate Last Admin    diatrizoate meglumine-sodium (GASTROGRAFIN) 66-10 % solution 15 mL  15 mL Oral ONCE PRN GERMAN Corbett - CNP   15 mL at 06/23/22 1312    piperacillin-tazobactam (ZOSYN) 3,375 mg in sodium chloride 0.9 % 50 mL IVPB (mini-bag)  3,375 mg IntraVENous Q8H Obey May MD 12.5 mL/hr at 06/26/22 1323 3,375 mg at 06/26/22 1323    sodium chloride flush 0.9 % injection 5-40 mL  5-40 mL IntraVENous 2 times per day Obey May MD   5 mL at 06/26/22 9718    sodium chloride flush 0.9 % injection 5-40 mL  5-40 mL IntraVENous PRN Obey May MD        0.9 % sodium chloride infusion   IntraVENous PRN Obey May MD        ondansetron (ZOFRAN-ODT) disintegrating tablet 4 mg  4 mg Oral Q8H PRN Obey May MD        Or    ondansetron (ZOFRAN) injection 4 mg  4 mg IntraVENous Q6H PRN Obey May MD        polyethylene glycol (GLYCOLAX) packet 17 g  17 g Oral Daily PRN Obey May MD        bisacodyl (DULCOLAX) suppository 10 mg  10 mg Rectal Daily PRN Obey May MD        famotidine (PEPCID) tablet 10 mg  10 mg Oral Daily PRN Obey May MD        aluminum & magnesium hydroxide-simethicone (MAALOX) 200-200-20 MG/5ML suspension 30 mL  30 mL Oral Q6H PRN Obey May MD        acetaminophen (TYLENOL) tablet 650 mg  650 mg Oral Q6H PRN Obey May MD   650 mg at 06/26/22 9331    Or    acetaminophen (TYLENOL) suppository 650 mg  650 mg Rectal Q6H PRN Obey May MD        enoxaparin Sodium (LOVENOX) injection 30 mg  30 mg SubCUTAneous Daily Obey May MD   30 mg at 06/26/22 0936    amitriptyline (ELAVIL) tablet 100 mg  100 mg Oral Nightly Obey May MD   100 mg at 06/25/22 2200    OLANZapine (ZYPREXA) tablet 7.5 mg  7.5 mg Oral Nightly Obey May MD   7.5 mg at 06/25/22 2200    levothyroxine (SYNTHROID) tablet 125 mcg  125 mcg Oral Daily Obey May MD   125 mcg at 06/26/22 0526    carvedilol (COREG) tablet 6.25 mg  6.25 mg Oral BID WC Rhona Gonzalez MD   6.25 mg at 06/26/22 0936    clonazePAM (KLONOPIN) tablet 4 mg  4 mg Oral Nightly Rhona Gonzalez MD   4 mg at 06/25/22 2200        Hospital problems     Patient Active Problem List   Diagnosis    Fecal incontinence    Dilated intrahepatic bile duct    [facility]BO [facility] obstruction) ([facility])    Hypoxemia    Dehydration    Weight loss    Rectal cancer ([address]    Bladder injury with open wound into cavity    Hypercalcemia of malignancy    Severe sepsis with acute organ dysfunction ([facility])    Attention to ileostomy ([facility])    Acute blood loss anemia    Anemia    Abscess    Hypernatremia    On total parenteral nutrition (TPN)    DVT (deep vein thrombosis) in pregnancy    Enterocutaneous fistula    Hypothyroidism    Small bowel obstruction (HCC)    Ileus ([facility])    Abdominal wall abscess    Pleural effusion    H/O urinary retention    Severe protein-calorie malnutrition (HCC)    Elevated LFTs    Urinary retention    Bilateral hydronephrosis    C. 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

## 2022-06-27 ENCOUNTER — APPOINTMENT (OUTPATIENT)
Dept: PHYSICAL THERAPY | Age: 77
End: 2022-06-27
Payer: MEDICARE

## 2022-06-27 ENCOUNTER — APPOINTMENT (OUTPATIENT)
Dept: GENERAL RADIOLOGY | Age: 77
DRG: 698 | End: 2022-06-27
Payer: MEDICARE

## 2022-06-27 ENCOUNTER — HOSPITAL ENCOUNTER (OUTPATIENT)
Dept: PHYSICAL THERAPY | Age: 77
Setting detail: RECURRING SERIES
End: 2022-06-27
Payer: MEDICARE

## 2022-06-27 LAB
SARS-COV-2 RDRP RESP QL NAA+PROBE: NOT DETECTED
SOURCE: NORMAL

## 2022-06-27 PROCEDURE — 2500000003 HC RX 250 WO HCPCS: Performed by: INTERNAL MEDICINE

## 2022-06-27 PROCEDURE — 1100000000 HC RM PRIVATE

## 2022-06-27 PROCEDURE — 6370000000 HC RX 637 (ALT 250 FOR IP): Performed by: INTERNAL MEDICINE

## 2022-06-27 PROCEDURE — 6360000002 HC RX W HCPCS: Performed by: INTERNAL MEDICINE

## 2022-06-27 PROCEDURE — 51702 INSERT TEMP BLADDER CATH: CPT

## 2022-06-27 PROCEDURE — 87635 SARS-COV-2 COVID-19 AMP PRB: CPT

## 2022-06-27 PROCEDURE — 97530 THERAPEUTIC ACTIVITIES: CPT

## 2022-06-27 PROCEDURE — 2580000003 HC RX 258: Performed by: INTERNAL MEDICINE

## 2022-06-27 PROCEDURE — 6370000000 HC RX 637 (ALT 250 FOR IP): Performed by: NURSE PRACTITIONER

## 2022-06-27 RX ADMIN — AMOXICILLIN AND CLAVULANATE POTASSIUM 1 TABLET: 875; 125 TABLET, FILM COATED ORAL at 21:23

## 2022-06-27 RX ADMIN — ENOXAPARIN SODIUM 30 MG: 100 INJECTION SUBCUTANEOUS at 08:48

## 2022-06-27 RX ADMIN — SULFAMETHOXAZOLE AND TRIMETHOPRIM 1 TABLET: 800; 160 TABLET ORAL at 08:48

## 2022-06-27 RX ADMIN — SODIUM CHLORIDE, PRESERVATIVE FREE 5 ML: 5 INJECTION INTRAVENOUS at 08:51

## 2022-06-27 RX ADMIN — SODIUM CHLORIDE, PRESERVATIVE FREE 10 ML: 5 INJECTION INTRAVENOUS at 21:26

## 2022-06-27 RX ADMIN — LEVOTHYROXINE SODIUM 125 MCG: 0.07 TABLET ORAL at 06:06

## 2022-06-27 RX ADMIN — SULFAMETHOXAZOLE AND TRIMETHOPRIM 1 TABLET: 800; 160 TABLET ORAL at 21:23

## 2022-06-27 RX ADMIN — CARVEDILOL 6.25 MG: 6.25 TABLET, FILM COATED ORAL at 17:46

## 2022-06-27 RX ADMIN — Medication 5 UNITS: at 18:05

## 2022-06-27 RX ADMIN — CARVEDILOL 6.25 MG: 6.25 TABLET, FILM COATED ORAL at 08:50

## 2022-06-27 RX ADMIN — AMOXICILLIN AND CLAVULANATE POTASSIUM 1 TABLET: 875; 125 TABLET, FILM COATED ORAL at 08:48

## 2022-06-27 RX ADMIN — CLONAZEPAM 4 MG: 1 TABLET ORAL at 21:23

## 2022-06-27 RX ADMIN — OLANZAPINE 7.5 MG: 5 TABLET, FILM COATED ORAL at 21:23

## 2022-06-27 RX ADMIN — AMITRIPTYLINE HYDROCHLORIDE 100 MG: 50 TABLET, FILM COATED ORAL at 21:23

## 2022-06-27 ASSESSMENT — PAIN SCALES - GENERAL
PAINLEVEL_OUTOF10: 0

## 2022-06-27 NOTE — PROGRESS NOTES
PHYSICAL THERAPY Daily Note  (Link to Caseload Tracking: PT Visit Days : 3  Time In/Out PT Charge Capture  Rehab Caseload Tracker  Orders      Marlon Castro is a 64 y.o. male   PRIMARY DIAGNOSIS: Sepsis with acute organ dysfunction ([email or web address]  Dehydration [E86.0]  SIRS (systemic inflammatory response syndrome) ([facility]) [R65.10]  Urinary tract infection associated with indwelling urethral catheter, initial encounter ([email or web address] [T83.511A, N39.0]  Sepsis without acute organ dysfunction, due to unspecified organism ([email or web address] [A41.9]       Inpatient: Payor: MEDICARE / Plan: MEDICARE PART A AND B / Product Type: *No Product type* /     ASSESSMENT:     REHAB RECOMMENDATIONS:   Recommendation to date pending progress:  Setting:   Short-term Rehab    Equipment:     Rolling Walker   To Be Determined     ASSESSMENT:  Mr. Haydee Barrios is in the recliner and eager to participate. He has been seeing outpatient PT at [facility] [place] and feels he is even weaker after this hospitalization and fears for his safety at home. He walked in the hallway with the walker and participated in standing exercises below. He is weak, lives alone in a 2 level townhome and would benefit from a short rehab stay.       SUBJECTIVE:   Mr. Haydee Barrios states, \"I have been waiting for my evaluationt\"     Social/Functional Lives With: Family  [facility]  Home Layout: Multi-level  Home Access: Stairs to enter with rails  Bathroom Shower/Tub: Tub/Shower unit  ADL Assistance: Independent  Homemaking Assistance: Independent  Ambulation Assistance: Independent  Active : Yes  Mode of Transportation: Car  OBJECTIVE:     PAIN: Cuongger Marcoi / O2: PRECAUTION / Tameka Boggs / Duglas Good:   Pre Treatment:          Post Treatment:0 Vitals        Oxygen    John Catheter    RESTRICTIONS/PRECAUTIONS:        MOBILITY: I Mod I S SBA CGA Min Mod Max Total  NT x2 Comments:   Bed Mobility    Rolling [] [] [] [] [] [] [] [] [] [] []    Supine to Sit [] [] [] [] [] [] [] [] [] [] []    Scooting [] [] [] [] [] [] [] [] [] [] []    Sit to Supine [] [] [] [] [] [] [] [] [] [x] []    Transfers    Sit to Stand [] [] [] [x] [] [] [] [] [] [] []    Bed to Chair [] [] [] [x] [] [] [] [] [] [] []    Stand to Sit [] [] [] [x] [] [] [] [] [] [] []     [] [] [] [] [] [] [] [] [] [] []    I=Independent, Mod I=Modified Independent, S=Supervision, SBA=Standby Assistance, CGA=Contact Guard Assistance,   Min=Minimal Assistance, Mod=Moderate Assistance, Max=Maximal Assistance, Total=Total Assistance, NT=Not Tested    BALANCE: Good Fair+ Fair Fair- Poor NT Comments   Sitting Static [x] [] [] [] [] []    Sitting Dynamic [x] [] [] [] [] []              Standing Static [] [x] [x] [] [] []    Standing Dynamic [] [x] [x] [] [] []      GAIT: I Mod I S SBA CGA Min Mod Max Total  NT x2 Comments:   Level of Assistance [] [] [] [x] [] [] [] [] [] [] []    Distance  (functional distance in hallway)     DME Rolling Walker    Gait Quality Decreased zoe , Path deviations  and Trunk sway increased    Weightbearing Status      Stairs      I=Independent, Mod I=Modified Independent, S=Supervision, SBA=Standby Assistance, CGA=Contact Guard Assistance,   Min=Minimal Assistance, Mod=Moderate Assistance, Max=Maximal Assistance, Total=Total Assistance, NT=Not Tested    PLAN:   ACUTE PHYSICAL THERAPY GOALS:   (Developed with and agreed upon by patient and/or caregiver.)  STG:  (1.)Mr. Nolasco will move from supine to sit and sit to supine , scoot up and down and roll side to side with STAND BY ASSIST within 4 treatment day(s). (2.)Mr. Nolasco will transfer from bed to chair and chair to bed with STAND BY ASSIST using the least restrictive device within 4 treatment day(s). (3.)Mr. Nolasco will ambulate with STAND BY ASSIST for 1000 feet with the least restrictive device within 4 treatment day(s).      LTG:  (1.)Mr. Nolasco will move from supine to sit and sit to supine , scoot up and down and roll side to side in bed with MODIFIED INDEPENDENCE within 7 treatment day(s). (2.)Mr. Nolasco will transfer from bed to chair and chair to bed with MODIFIED INDEPENDENCE using the least restrictive device within 7 treatment day(s). (3.)Mr. Nolasco will ambulate with MODIFIED INDEPENDENCE for community distances  with the least restrictive device within 7 treatment day(    FREQUENCY AND DURATION: 3 times/week for duration of hospital stay or until stated goals are met, whichever comes first.    TREATMENT:   TREATMENT:   Therapeutic Exercise (25 Minutes): Therapeutic exercises noted below to improve functional activity tolerance, AROM, strength and mobility.      TREATMENT GRID:   Date:  6/27/22 Date:   Date:     Activity/Exercise Parameters Parameters Parameters   ambulation With walker  200ft     Calf raises 15x B     Standing hip abd 10x B     High knee marching 10x B     squats 10x                       AFTER TREATMENT PRECAUTIONS: Alarm Activated, Call light within reach, Chair, Needs within reach and RN notified    INTERDISCIPLINARY COLLABORATION:  RN/ PCT and PT/ PTA    EDUCATION:      TIME IN/OUT:  Time In: 1135  Time Out: 1200  Minutes: 25    Wava Every, PTA

## 2022-06-27 NOTE — PROGRESS NOTES
Bedside report received from night nurse Erwin Edwards. Assessment done as noted  Respiration even and unlabored 20/min; denies pain or nausea at present. Ostomy intact and patent. John intact and patent draining yellow urine. Remains on RA with O2 sats 94% at rest. Encouraged to call with needs.

## 2022-06-27 NOTE — CARE COORDINATION
Patient can discharge tomorrow if stable to [facility]. Patient doesn't have a 48 hour ppd so he'll need a chest xray signed by MD. Bills Downs test has been ordered.

## 2022-06-27 NOTE — PROGRESS NOTES
OCCUPATIONAL THERAPY Daily Note     OT Visit Days: 2   Time  OT Charge Capture  Rehab Caseload Tracker  OT Orders    Saadia Thrasher is a 64 y.o. male   PRIMARY DIAGNOSIS: Sepsis with [facility] organ dysfunction ([facility]  Dehydration [E86.0]  SIRS (systemic inflammatory response syndrome) ([facility]) [R65.10]  Urinary tract infection associated with indwelling urethral catheter, initial encounter ([facility] [T83.511A, N39.0]  Sepsis without acute organ dysfunction, due to unspecified organism ([facility] [A41.9]       Inpatient: Payor: Hemal Khalil / Plan: MEDICARE PART A AND B / Product Type: *No Product type* /     ASSESSMENT:     REHAB RECOMMENDATIONS: CURRENT LEVEL OF FUNCTION:  (Most Recently Demonstrated)   Recommendation to date pending progress:  Setting:   Short-term Rehab    Equipment:     To Be Determined Bathing:   Not Tested  Dressing:   Not Tested  Feeding/Grooming:   Not Tested  Toileting:   Not Tested  Functional Mobility:   Stand by Assist     ASSESSMENT:  Mr. Ming De León was supine in bed upon arrival. Pt completed bed mobility with SBA. Pt completed functional transfer with SBA using rolling walker. Continue POC. SUBJECTIVE:     Mr. Ming De León states, \"I am fine in bed, I do not need to get up. \"     Social/Functional Lives With: Family  Type of Home: Condo  Home Layout: Multi-level  Home Access: Stairs to enter with rails  Bathroom Shower/Tub: Tub/Shower unit  ADL Assistance: Independent  Homemaking Assistance: Independent  Ambulation Assistance: Independent  Active : Yes  Mode of Transportation: Car    OBJECTIVE:     Omar Chand / Mercedes Osborn / Javier Turneruffer: IV    RESTRICTIONS/PRECAUTIONS:  Restrictions/Precautions  Restrictions/Precautions: Fall Risk        PAIN: VITALS / O2:   Pre Treatment: 0             Post Treatment: 0 Vitals          Oxygen            MOBILITY: I Mod I S SBA CGA Min Mod Max Total  NT x2 Comments:   Bed Mobility    Rolling [] [] [] [] [] [] [] [] [] [] []    Supine to Sit [] [] [] [x] [] [] [] [] [] [] []    Scooting [] [] [] [] [] [] [] [] [] [] []    Sit to Supine [] [] [] [] [] [] [] [] [] [] []    Transfers    Sit to Stand [] [] [] [x] [] [] [] [] [] [] []    Bed to Chair [] [] [] [x] [] [] [] [] [] [] [] RW   Stand to Sit [] [] [] [x] [] [] [] [] [] [] []    Tub/Shower [] [] [] [] [] [] [] [] [] [] []     Toilet [] [] [] [] [] [] [] [] [] [] []      [] [] [] [] [] [] [] [] [] [] []    I=Independent, Mod I=Modified Independent, S=Supervision/Setup, SBA=Standby Assistance, CGA=Contact Guard Assistance, Min=Minimal Assistance, Mod=Moderate Assistance, Max=Maximal Assistance, Total=Total Assistance, NT=Not Tested    ACTIVITIES OF DAILY LIVING: I Mod I S SBA CGA Min Mod Max Total NT Comments   BASIC ADLs:              Upper Body   Bathing [] [] [] [] [] [] [] [] [] []    Lower Body Bathing [] [] [] [] [] [] [] [] [] []    Toileting [] [] [] [] [] [] [] [] [] []    Upper Body Dressing [] [] [] [] [] [] [] [] [] []    Lower Body Dressing [] [] [] [] [] [] [] [] [] []    Feeding [] [] [] [] [] [] [] [] [] []    Grooming [] [] [] [] [] [] [] [] [] []    Personal Device Care [] [] [] [] [] [] [] [] [] []    Functional Mobility [] [] [] [] [] [] [] [] [] []    I=Independent, Mod I=Modified Independent, S=Supervision/Setup, SBA=Standby Assistance, CGA=Contact Guard Assistance, Min=Minimal Assistance, Mod=Moderate Assistance, Max=Maximal Assistance, Total=Total Assistance, NT=Not Tested    BALANCE: Good Fair+ Fair Fair- Poor NT Comments   Sitting Static [x] [] [] [] [] []    Sitting Dynamic [] [] [] [] [] []              Standing Static [] [x] [] [] [] []    Standing Dynamic [] [] [] [] [] []        PLAN:     FREQUENCY/DURATION   OT Plan of Care: 3 times/week for duration of hospital stay or until stated goals are met, whichever comes first.    ACUTE OCCUPATIONAL THERAPY GOALS:   (Developed with and agreed upon by patient and/or caregiver.)  1. Patient will complete lower body dressing with supervision.    2. Patient will compete upper body dressing with supervision. 3. Patient will complete bathing with supervision. 4. Patient will stand x10 mins to complete self care tasks with supervision. 5. Patient will tolerate 25 mins of self care, therapeutic exercise or therapeutic activity to increase endurance needed to improve ability with self care tasks. TREATMENT:     TREATMENT:   Therapeutic Activity (23 Minutes): Therapeutic activity included Supine to Sit, Transfer Training, Ambulation on level ground, Sitting balance  and Standing balance to improve functional Activity tolerance and Balance.     TREATMENT GRID:  N/A    AFTER TREATMENT PRECAUTIONS: Bed/Chair Locked, Chair, Needs within reach and RN notified    INTERDISCIPLINARY COLLABORATION:  PT/ PTA and OT/ [place]    EDUCATION:       TOTAL TREATMENT DURATION AND TIME:  Time In: 1001  Time Out: 1024  Minutes: 1305 [facility] hospital

## 2022-06-27 NOTE — CARE COORDINATION
Patient was seen by PT and recommended for rehab. CM met with the patient. He requested [id number] or [employer]. CM placed the referral. Awaiting response.

## 2022-06-27 NOTE — PROGRESS NOTES
Progress Note    Patient: Anthony Gutierrez MRN: [id number]  SSN: [id number]    YOB: 1945  Age: 64 y.o. Sex: male      Admit Date: 6/21/2022    LOS: 6 days     Assessment and Plan:   64 y. o. male with medical history of rectal cancer s/p sigmoidectomy with colostomy, MDD, urinary retention with chronic carr who was admitted for sepsis with acute organ dysfunction, KEVAN and UTI    1. Sepsis secondary to polymicrobial bacteremia likely in the setting of UTI  -Zosyn discontinued  -Started augmentin and bactrim  -Follow blood cultures - Klebsiella, E Coli, Enterococcus faecalis  -Renal ultrasound without acute abnormalities  -CT of the abdomen and pelvis without acute findings  -Infectious disease recommendations appreciated    2. Acute kidney injury likely prerenal  -IV fluids  -Avoid nephrotoxic agents  -Monitor renal function    3. Hypertension  -Continue carvedilol    4. Hypothyroidism  -Continue levothyroxine    DVT prophylaxis with Lovenox     Subjective:   64 y. o. male with medical history of rectal cancer s/p sigmoidectomy with colostomy, MDD, urinary retention with chronic carr who was admitted for sepsis with acute organ dysfunction, KEVAN and UTI. Patient seen and examined at bedside. This morning still a little tired. Otherwise denies any chest pain, no abdominal pain, no nausea vomiting or diarrhea.     Objective:     Vitals:    06/27/22 0242 06/27/22 0715 06/27/22 0850 06/27/22 1215   BP: (!) 143/79 (!) 151/91 (!) 151/91 117/77   Pulse: 68 71 71 65   Resp: 17 17  16   Temp: 97.3 °F (36.3 °C) 98.1 °F (36.7 °C)  98 °F (36.7 °C)   TempSrc:  Oral  Oral   SpO2: 94%      Weight:       Height:            Intake and Output:  Current Shift: 06/27 0701 - 06/27 1900  In: -   Out: 750 [Urine:750]  Last three shifts: 06/25 1901 - 06/27 0700  In: -   Out: 5475 [Urine:5475]    ROS  10 ROS negative except from stated on subjective    Physical Exam:   General: Alert, oriented, NAD  HEENT: NC/AT, EOM are intact  Neck: supple, no JVD  Cardiovascular: RRR, S1, S2, no murmurs  Respiratory: Lungs are clear, no wheezes or rales  Abdomen: Soft, NT, ND  Back: No CVA tenderness, no paraspinal tenderness  Extremities: LE without pedal edema, no erythema  Neuro: A&O, CN are intact, no focal deficits  Skin: no rash or ulcers  Psych: good mood and affect    Lab/Data Review:  I have personally reviewed patients laboratory data    Image:  I have personally reviewed patients imaging showing  CT ABDOMEN PELVIS W IV CONTRAST Additional Contrast? Radiologist Recommendation (IV and oral contrast)   Final Result   1. No definite new source for the patient's bacteremia. There is no definite   intra-abdominal abscess or new fluid collection in the pelvis. There is a small   right presacral fluid collection that contains a small pocket of air that   measures roughly 3 cm that has decreased in size from the prior exam.   2. The bladder wall remains thickened but is decompressed primarily by John   catheter. Cystitis remains a consideration. The previous posterior bladder wall   rupture now has an appearance more in keeping with phlegmon/chronic wall   thickening without significant fluid within the rupture. No significant free   fluid in the pelvis at all. 3. Nonspecific gallbladder wall thickening. US RETROPERITONEAL LIMITED   Final Result   No hydronephrosis or obvious renal calculi. Possible slight   increased echogenicity of the kidneys suggesting underlying chronic renal   disease. John catheter decompresses the bladder. Small echogenic foci in the   bladder lumen possibly debris in the bladder. Mild bladder wall thickening may   be related to incomplete distention. XR CHEST PORTABLE   Final Result   No evidence of an acute intrathoracic process.               Current Facility-Administered Medications   Medication Dose Route Frequency Provider Last Rate Last Admin    amoxicillin-clavulanate (AUGMENTIN) 875-125 MG per tablet 1 tablet  1 tablet Oral 2 times per day [facility] T[facility] APRN - CNP   1 tablet at 06/27/22 0848    sulfamethoxazole-trimethoprim (BACTRIM DS;SEPTRA DS) 800-160 MG per tablet 1 tablet  1 tablet Oral 2 times per day [facility] Tanner APRN - CNP   1 tablet at 06/27/22 0848    diatrizoate meglumine-sodium (GASTROGRAFIN) 66-10 % solution 15 mL  15 mL Oral ONCE PRN Naz Vo APRN - CNP   15 mL at 06/23/22 1312    sodium chloride flush 0.9 % injection 5-40 mL  5-40 mL IntraVENous 2 times per day Ambrosio Oglesby MD   5 mL at 06/27/22 0851    sodium chloride flush 0.9 % injection 5-40 mL  5-40 mL IntraVENous PRN Ambrosio Oglesby MD        0.9 % sodium chloride infusion   IntraVENous PRN Ambrosio Oglesby MD        ondansetron (ZOFRAN-ODT) disintegrating tablet 4 mg  4 mg Oral Q8H PRN Ambrosio Oglesby MD        Or    ondansetron (ZOFRAN) injection 4 mg  4 mg IntraVENous Q6H PRN Ambrosio Oglesby MD        polyethylene glycol (GLYCOLAX) packet 17 g  17 g Oral Daily PRN Ambrosio Oglesby MD        bisacodyl (DULCOLAX) suppository 10 mg  10 mg Rectal Daily PRN Ambrosio Oglesby MD        famotidine (PEPCID) tablet 10 mg  10 mg Oral Daily PRN Ambrosio Oglesby MD        aluminum & magnesium hydroxide-simethicone (MAALOX) 200-200-20 MG/5ML suspension 30 mL  30 mL Oral Q6H PRN Ambrosio Oglesby MD        acetaminophen (TYLENOL) tablet 650 mg  650 mg Oral Q6H PRN Ambrosio Oglesby MD   650 mg at 06/26/22 2807    Or    acetaminophen (TYLENOL) suppository 650 mg  650 mg Rectal Q6H PRN Ambrosio Oglesby MD        enoxaparin Sodium (LOVENOX) injection 30 mg  30 mg SubCUTAneous Daily Ambrosio Oglesby MD   30 mg at 06/27/22 0848    amitriptyline (ELAVIL) tablet 100 mg  100 mg Oral Nightly Ambrosio Oglesby MD   100 mg at 06/26/22 2130    OLANZapine (ZYPREXA) tablet 7.5 mg  7.5 mg Oral Nightly Ambrosio Oglesby MD   7.5 mg at 06/26/22 2130    levothyroxine (SYNTHROID) tablet 125 mcg  125 mcg Oral Daily Ambrosio Oglesby MD   125 mcg at 06/27/22 0606    carvedilol (COREG) tablet 6.25 mg  6.25 mg Oral BID WC Tammy Jernigan MD   6.25 mg at 06/27/22 0850    clonazePAM (KLONOPIN) tablet 4 mg  4 mg Oral Nightly Tammy Jernigan MD   4 mg at 06/26/22 2130        Hospital problems     Patient Active Problem List   Diagnosis    Fecal incontinence    Dilated intrahepatic bile duct    [facility]) [facility]    Hypoxemia    Dehydration    Weight loss    Rectal cancer ([address]    Bladder injury with open wound into cavity    Hypercalcemia of malignancy    Severe sepsis with acute organ dysfunction ([facility]    Attention to ileostomy ([facility]    Acute blood loss anemia    Anemia    Abscess    Hypernatremia    On total parenteral nutrition (TPN)    DVT (deep vein thrombosis) in pregnancy    Enterocutaneous fistula    Hypothyroidism    Small bowel obstruction (HCC)    Ileus ([facility]    Abdominal wall abscess    Pleural effusion    H/O urinary retention    Severe protein-calorie malnutrition (HCC)    Elevated LFTs    Urinary retention    Bilateral hydronephrosis    C. difficile colitis    Anxiety    Pancreatic duct dilated    Hypomagnesemia    Rectal fistula    Hematuria    Major depression    Colostomy care ([address]    Chronic indwelling John catheter    Acute deep vein thrombosis (DVT) of non-extremity vein    Hypokalemia    Postoperative ileus (HCC)    Bowel incontinence    Constipation    Urinary tract infection associated with indwelling urethral catheter ([address]    KEVAN (acute kidney injury) ([address]    Pelvic abscess in male [facility])    Infected prosthetic mesh of abdominal wall (HCC)    Fever    Acute UTI (urinary tract infection)    Pseudopolyposis of colon without complication, unspecified part of colon ([address]    Sepsis with acute organ dysfunction ([address]    Mild episode of recurrent major depressive disorder ([address]    Gram-negative bacteremia    Bacteremia due to Gram-positive bacteria       Signed By: Sahara Estrada MD     June 27, 2022

## 2022-06-28 VITALS
DIASTOLIC BLOOD PRESSURE: 73 MMHG | OXYGEN SATURATION: 97 % | HEART RATE: 73 BPM | SYSTOLIC BLOOD PRESSURE: 119 MMHG | TEMPERATURE: 99.1 F | RESPIRATION RATE: 19 BRPM | WEIGHT: 126.8 LBS | BODY MASS INDEX: 19.22 KG/M2 | HEIGHT: 68 IN

## 2022-06-28 LAB
ANION GAP SERPL CALC-SCNC: 3 MMOL/L (ref 7–16)
BACTERIA SPEC CULT: NORMAL
BACTERIA SPEC CULT: NORMAL
BASOPHILS # BLD: 0 K/UL (ref 0–0.2)
BASOPHILS NFR BLD: 1 % (ref 0–2)
BUN SERPL-MCNC: 26 MG/DL (ref 8–23)
CALCIUM SERPL-MCNC: 8.7 MG/DL (ref 8.3–10.4)
CHLORIDE SERPL-SCNC: 102 MMOL/L (ref 98–107)
CO2 SERPL-SCNC: 33 MMOL/L (ref 21–32)
CREAT SERPL-MCNC: 1.2 MG/DL (ref 0.8–1.5)
DIFFERENTIAL METHOD BLD: ABNORMAL
EOSINOPHIL # BLD: 0.2 K/UL (ref 0–0.8)
EOSINOPHIL NFR BLD: 3 % (ref 0.5–7.8)
ERYTHROCYTE [DISTWIDTH] IN BLOOD BY AUTOMATED COUNT: 13.3 % (ref 11.9–14.6)
GLUCOSE SERPL-MCNC: 86 MG/DL (ref 65–100)
HCT VFR BLD AUTO: 37.8 % (ref 41.1–50.3)
HGB BLD-MCNC: 12.1 G/DL (ref 13.6–17.2)
IMM GRANULOCYTES # BLD AUTO: 0.1 K/UL (ref 0–0.5)
IMM GRANULOCYTES NFR BLD AUTO: 2 % (ref 0–5)
LYMPHOCYTES # BLD: 0.9 K/UL (ref 0.5–4.6)
LYMPHOCYTES NFR BLD: 14 % (ref 13–44)
MCH RBC QN AUTO: 28.7 PG (ref 26.1–32.9)
MCHC RBC AUTO-ENTMCNC: 32 G/DL (ref 31.4–35)
MCV RBC AUTO: 89.8 FL (ref 79.6–97.8)
MONOCYTES # BLD: 0.7 K/UL (ref 0.1–1.3)
MONOCYTES NFR BLD: 12 % (ref 4–12)
NEUTS SEG # BLD: 4 K/UL (ref 1.7–8.2)
NEUTS SEG NFR BLD: 68 % (ref 43–78)
NRBC # BLD: 0 K/UL (ref 0–0.2)
PLATELET # BLD AUTO: 215 K/UL (ref 150–450)
PMV BLD AUTO: 9.4 FL (ref 9.4–12.3)
POTASSIUM SERPL-SCNC: 4 MMOL/L (ref 3.5–5.1)
RBC # BLD AUTO: 4.21 M/UL (ref 4.23–5.6)
SERVICE CMNT-IMP: NORMAL
SERVICE CMNT-IMP: NORMAL
SODIUM SERPL-SCNC: 138 MMOL/L (ref 138–145)
WBC # BLD AUTO: 6 K/UL (ref 4.3–11.1)

## 2022-06-28 PROCEDURE — 80048 BASIC METABOLIC PNL TOTAL CA: CPT

## 2022-06-28 PROCEDURE — 36415 COLL VENOUS BLD VENIPUNCTURE: CPT

## 2022-06-28 PROCEDURE — 6370000000 HC RX 637 (ALT 250 FOR IP): Performed by: INTERNAL MEDICINE

## 2022-06-28 PROCEDURE — 97530 THERAPEUTIC ACTIVITIES: CPT

## 2022-06-28 PROCEDURE — 6370000000 HC RX 637 (ALT 250 FOR IP): Performed by: NURSE PRACTITIONER

## 2022-06-28 PROCEDURE — 85025 COMPLETE CBC W/AUTO DIFF WBC: CPT

## 2022-06-28 PROCEDURE — 6360000002 HC RX W HCPCS: Performed by: INTERNAL MEDICINE

## 2022-06-28 PROCEDURE — 2580000003 HC RX 258: Performed by: INTERNAL MEDICINE

## 2022-06-28 RX ORDER — CLONAZEPAM 2 MG/1
4 TABLET ORAL NIGHTLY PRN
Qty: 6 TABLET | Refills: 0 | Status: ON HOLD | OUTPATIENT
Start: 2022-06-28 | End: 2022-10-23

## 2022-06-28 RX ORDER — FAMOTIDINE 10 MG
10 TABLET ORAL DAILY
Qty: 30 TABLET | Refills: 0 | Status: ON HOLD
Start: 2022-06-28 | End: 2022-10-23 | Stop reason: HOSPADM

## 2022-06-28 RX ORDER — AMOXICILLIN AND CLAVULANATE POTASSIUM 875; 125 MG/1; MG/1
1 TABLET, FILM COATED ORAL EVERY 12 HOURS SCHEDULED
Qty: 20 TABLET | Refills: 0 | Status: ON HOLD
Start: 2022-06-28 | End: 2022-07-08 | Stop reason: SDUPTHER

## 2022-06-28 RX ORDER — SULFAMETHOXAZOLE AND TRIMETHOPRIM 800; 160 MG/1; MG/1
1 TABLET ORAL EVERY 12 HOURS SCHEDULED
Qty: 20 TABLET | Refills: 0 | Status: ON HOLD
Start: 2022-06-28 | End: 2022-07-08 | Stop reason: HOSPADM

## 2022-06-28 RX ADMIN — CARVEDILOL 6.25 MG: 6.25 TABLET, FILM COATED ORAL at 08:36

## 2022-06-28 RX ADMIN — LEVOTHYROXINE SODIUM 125 MCG: 0.07 TABLET ORAL at 06:02

## 2022-06-28 RX ADMIN — SULFAMETHOXAZOLE AND TRIMETHOPRIM 1 TABLET: 800; 160 TABLET ORAL at 08:36

## 2022-06-28 RX ADMIN — ENOXAPARIN SODIUM 30 MG: 100 INJECTION SUBCUTANEOUS at 08:36

## 2022-06-28 RX ADMIN — SODIUM CHLORIDE, PRESERVATIVE FREE 10 ML: 5 INJECTION INTRAVENOUS at 08:36

## 2022-06-28 RX ADMIN — AMOXICILLIN AND CLAVULANATE POTASSIUM 1 TABLET: 875; 125 TABLET, FILM COATED ORAL at 08:36

## 2022-06-28 NOTE — PROGRESS NOTES
TRANSFER - OUT REPORT:    Verbal report given to [facility] RN on Triston Livingston  being transferred to [facility] for routine progression of patient care       Report consisted of patient's Situation, Background, Assessment and   Recommendations(SBAR). Information from the following report(s) Adult Overview, Intake/Output and MAR was reviewed with the receiving nurse. Lines:       Opportunity for questions and clarification was provided.       Patient transported with:  [employer]

## 2022-06-28 NOTE — PROGRESS NOTES
Pt resting in bed awake. Alert and oriented times 4 at this time. On RA. No s/sx of distress noted. John in place and draining. Colostomy in place. Clean, dry, intact. Denies any pain at this time. Encouraged to call for assistance as needed. Call light within reach. Will continue to monitor.

## 2022-06-28 NOTE — PROGRESS NOTES
PHYSICAL THERAPY Daily Note  (Link to Caseload Tracking: PT Visit Days : 4  Time In/Out PT Charge Capture  Rehab Caseload Tracker  Orders      Wilner Chaney is a 64 y.o. male   PRIMARY DIAGNOSIS: Sepsis with acute organ dysfunction ([facility]  Dehydration [E86.0]  SIRS (systemic inflammatory response syndrome) ([facility]) [R65.10]  Urinary tract infection associated with indwelling urethral catheter, initial encounter ([facility] [T83.511A, N39.0]  Sepsis without acute organ dysfunction, due to unspecified organism ([facility] [A41.9]       Inpatient: Payor: MEDICARE / Plan: MEDICARE PART A AND B / Product Type: *No Product type* /     ASSESSMENT:     REHAB RECOMMENDATIONS:   Recommendation to date pending progress:  Setting:   Short-term Rehab    Equipment:     To Be Determined     ASSESSMENT:  Mr. Randi Fitzgerald is in the recliner and eager to participate. He walked the whole floor with the walker to start and SBA. He then walked about 50 ft without the walker and CGA to practice. Should be going to rehab later today. SUBJECTIVE:   Mr. Randi Fitzgerald states, \"lets walk. \"     Social/Functional Lives With: Family  Type of Home: Condo  Home Layout: Multi-level  Home Access: Stairs to enter with rails  Bathroom Shower/Tub: Tub/Shower unit  ADL Assistance: Independent  Homemaking Assistance: Independent  Ambulation Assistance: Independent  Active : Yes  Mode of Transportation: Car  OBJECTIVE:     PAIN: Arnold Cagey / O2: Clovia Sylvia / Delmas Hockey / Laveta Hives:   Pre Treatment:          Post Treatment:0 Vitals        Oxygen    John Catheter    RESTRICTIONS/PRECAUTIONS:        MOBILITY: I Mod I S SBA CGA Min Mod Max Total  NT x2 Comments:   Bed Mobility    Rolling [] [] [] [] [] [] [] [] [] [] []    Supine to Sit [] [] [] [] [] [] [] [] [] [] []    Scooting [] [] [] [] [] [] [] [] [] [] []    Sit to Supine [] [] [] [] [] [] [] [] [] [x] []    Transfers    Sit to Stand [] [] [] [x] [] [] [] [] [] [] []    Bed to Chair [] [] [] [x] [] [] [] [] [] [] [] Stand to Sit [] [] [] [x] [] [] [] [] [] [] []     [] [] [] [] [] [] [] [] [] [] []    I=Independent, Mod I=Modified Independent, S=Supervision, SBA=Standby Assistance, CGA=Contact Guard Assistance,   Min=Minimal Assistance, Mod=Moderate Assistance, Max=Maximal Assistance, Total=Total Assistance, NT=Not Tested    BALANCE: Good Fair+ Fair Fair- Poor NT Comments   Sitting Static [x] [] [] [] [] []    Sitting Dynamic [x] [] [] [] [] []              Standing Static [] [x] [x] [] [] []    Standing Dynamic [] [x] [x] [] [] []      GAIT: I Mod I S SBA CGA Min Mod Max Total  NT x2 Comments:   Level of Assistance [] [] [] [x] [] [] [] [] [] [] []    Distance 500     DME Rolling Walker    Gait Quality Decreased zoe , Path deviations  and Trunk sway increased    Weightbearing Status      Stairs      I=Independent, Mod I=Modified Independent, S=Supervision, SBA=Standby Assistance, CGA=Contact Guard Assistance,   Min=Minimal Assistance, Mod=Moderate Assistance, Max=Maximal Assistance, Total=Total Assistance, NT=Not Tested    PLAN:   ACUTE PHYSICAL THERAPY GOALS:   (Developed with and agreed upon by patient and/or caregiver.)  STG:  (1.)Mr. Nolasco will move from supine to sit and sit to supine , scoot up and down and roll side to side with STAND BY ASSIST within 4 treatment day(s). (2.)Mr. Nolasco will transfer from bed to chair and chair to bed with STAND BY ASSIST using the least restrictive device within 4 treatment day(s). (3.)Mr. Nolasco will ambulate with STAND BY ASSIST for 1000 feet with the least restrictive device within 4 treatment day(s).      LTG:  (1.)Mr. Nolasco will move from supine to sit and sit to supine , scoot up and down and roll side to side in bed with MODIFIED INDEPENDENCE within 7 treatment day(s). (2.)Mr. Nolasco will transfer from bed to chair and chair to bed with MODIFIED INDEPENDENCE using the least restrictive device within 7 treatment day(s). (3.)Mr. Nolasco will ambulate with MODIFIED INDEPENDENCE for community distances  with the least restrictive device within 7 treatment day(    FREQUENCY AND DURATION: 3 times/week for duration of hospital stay or until stated goals are met, whichever comes first.    TREATMENT:   TREATMENT:   Therapeutic Activity (15 Minutes): Therapeutic activity included Ambulation on level ground and Standing balance to improve functional Activity tolerance, Balance, Mobility and Strength.     TREATMENT GRID:   Date:  6/27/22 Date:   Date:     Activity/Exercise Parameters Parameters Parameters   ambulation With walker  200ft     Calf raises 15x B     Standing hip abd 10x B     High knee marching 10x B     squats 10x                       AFTER TREATMENT PRECAUTIONS: Call light within reach, Chair, Needs within reach and RN notified    INTERDISCIPLINARY COLLABORATION:  RN/ PCT and PT/ PTA    EDUCATION:      TIME IN/OUT:  Time In: 1045  Time Out: 1055  Minutes: Imani #5 Josseline Almanza, PTA

## 2022-06-28 NOTE — DISCHARGE SUMMARY
Date of Admission: 6/21/2022  Date of Discharge: 6/28/2022    Discharge Diagnoses:  Principal Problem:    Sepsis with acute organ dysfunction [facility])  Active Problems:    Mild episode of recurrent major depressive disorder (HCC)    Gram-negative bacteremia    Bacteremia due to Gram-positive bacteria    Rectal cancer (HCC)    Hypothyroidism    Severe protein-calorie malnutrition (HCC)    Colostomy care ([facility]    Urinary tract infection associated with indwelling urethral catheter ([facility]    KEVAN (acute kidney injury) ([facility]  Resolved Problems:    * No resolved hospital problems. *       Discharge Medications:  Current Discharge Medication List      START taking these medications    Details   sulfamethoxazole-trimethoprim (BACTRIM DS;SEPTRA DS) 800-160 MG per tablet Take 1 tablet by mouth every 12 hours for 10 days  Qty: 20 tablet, Refills: 0      amoxicillin-clavulanate (AUGMENTIN) 875-125 MG per tablet Take 1 tablet by mouth every 12 hours for 10 days  Qty: 20 tablet, Refills: 0      famotidine (PEPCID) 10 MG tablet Take 1 tablet by mouth daily  Qty: 30 tablet, Refills: 0         CONTINUE these medications which have CHANGED    Details   clonazePAM (KLONOPIN) 2 MG tablet Take 2 tablets by mouth nightly as needed for Anxiety for up to 3 days.   Qty: 6 tablet, Refills: 0    Associated Diagnoses: Anxiety         CONTINUE these medications which have NOT CHANGED    Details   acetaminophen (TYLENOL) 500 MG tablet Take 500 mg by mouth every 6 hours as needed      amitriptyline (ELAVIL) 100 MG tablet Take 100 mg by mouth      carvedilol (COREG) 3.125 MG tablet Take 6.25 mg by mouth 2 times daily (with meals)      ibuprofen (ADVIL;MOTRIN) 600 MG tablet Take 600 mg by mouth every 6 hours as needed      levothyroxine (SYNTHROID) 125 MCG tablet TAKE ONE TABLET BY MOUTH ONE TIME DAILY BEFORE BREAKFAST      OLANZapine (ZYPREXA) 2.5 MG tablet Take 7.5 mg by mouth         STOP taking these medications       ascorbic acid (VITAMIN C) 500 MG tablet Comments:   Reason for Stopping:         Cholecalciferol 50 MCG (2000 UT) CAPS Comments:   Reason for Stopping:         cyanocobalamin 500 MCG tablet Comments:   Reason for Stopping:               Pending Labs:  None    Follow-up (including scheduled tests):  PMD    History of Present Illness:  64 y.o. male with medical history of rectal cancer s/p sigmoidectomy with colostomy, MDD, urinary retention with chronic carr who presented due to decreased performance urinary catheter. Noticed having dark cloudy puslike urine his urinary catheter since yesterday afternoon. Patient also reports feeling severely weak overnight and unable to get out of bed. Reports feeling chilled without any recorded temperature at home. Denies any pains, shortness of breath, abdominal pain, nausea, vomiting. Patient reports that he was able to work out earlier in the day but became extremely weak at night time. In the emergency room, patient was tachycardic, tachypneic but otherwise hemodynamically stable. Laboratory work-up shows WBC 3.6, hemoglobin 13.3, fritz 131, BUN 46 and creatinine of 2.00. UA shows large leukocyte esterase, turbid urine, 4+ bacteria, 100 WBC. Chest x-ray without any acute intrathoracic process. EKG with normal sinus rhythm with rate of 93.     Past Medical History:  Past Medical History:   Diagnosis Date    Acute deep vein thrombosis (DVT) of non-extremity vein 5/20/2019    Family history of malignant neoplasm of gastrointestinal tract     mother colon/ovarian cancer 67    Fecal incontinence     LAR syndrome    Carr catheter in place 2022    patient stated- \"catheter due to them nicking his bladder and it won't heal\"    Former cigarette smoker     History of rectal cancer     Hypothyroid     stable w/med    Infection and inflammatory reaction due to other internal prosthetic devices, implants and grafts, subsequent encounter 2017    abd wound/mesh colostomy    Insomnia     takes meds    Major depression     Osteoarthritis, hand     Personal history of colonic polyps 9/2013    x 1    Personal history of malignant neoplasm of rectum, rectosigmoid junction, 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    surgery and radiation    Psychiatric disorder     anxiety       Allergies:  No Known Allergies    Hospital Course:  64 y. o. male with medical history of rectal cancer s/p sigmoidectomy with colostomy, MDD, urinary retention with chronic carr who was admitted for sepsis with acute organ dysfunction, KEVAN and UTI     1. Sepsis secondary to polymicrobial bacteremia likely in the setting of catheter induced UTI  -Zosyn started  -Started IVF  -Blood cultures - Klebsiella, E Coli, Enterococcus faecalis  -ID consulted  -Renal ultrasound without acute abnormalities  -CT of the abdomen and pelvis without acute findings  -Transitioned to augmentin and bactrim per ID recommendations  -Improved symptomatology  -Hemodynamically stable on discharge     2.   Acute kidney injury likely prerenal  -Started IV fluids  -Renal function back to baseline    Procedures:  None    Discharge Day Information:  Follow with PMD    Diet: Regular    Activity: As tolerated    Discharge Physical Exam:  General: Alert, oriented, NAD  HEENT: NC/AT, EOM are intact  Neck: supple, no JVD  Cardiovascular: RRR, S1, S2, no murmurs  Respiratory: Lungs are clear, no wheezes or rales  Abdomen: Soft, NT, ND  Back: No CVA tenderness, no paraspinal tenderness  Extremities: LE without pedal edema, no erythema  Neuro: A&O, CN are intact, no focal deficits  Skin: no rash or ulcers  Psych: good mood and affect    Recent Results (from the past 24 hour(s))   COVID-19, Rapid    Collection Time: 06/27/22  5:48 PM    Specimen: Nasopharyngeal   Result Value Ref Range    Source Nasopharyngeal      SARS-CoV-2, Rapid Not detected NOTD     CBC with Auto Differential    Collection Time: 06/28/22  4:06 AM   Result Value Ref Range    WBC 6.0 4.3 - 11.1 K/uL    RBC 4.21 (L) 4.23 - 5.6 M/uL Hemoglobin 12.1 (L) 13.6 - 17.2 g/dL    Hematocrit 37.8 (L) 41.1 - 50.3 %    MCV 89.8 79.6 - 97.8 FL    MCH 28.7 26.1 - 32.9 PG    MCHC 32.0 31.4 - 35.0 g/dL    RDW 13.3 11.9 - 14.6 %    Platelets 862 282 - 059 K/uL    MPV 9.4 9.4 - 12.3 FL    nRBC 0.00 0.0 - 0.2 K/uL    Differential Type AUTOMATED      Seg Neutrophils 68 43 - 78 %    Lymphocytes 14 13 - 44 %    Monocytes 12 4.0 - 12.0 %    Eosinophils % 3 0.5 - 7.8 %    Basophils 1 0.0 - 2.0 %    Immature Granulocytes 2 0.0 - 5.0 %    Segs Absolute 4.0 1.7 - 8.2 K/UL    Absolute Lymph # 0.9 0.5 - 4.6 K/UL    Absolute Mono # 0.7 0.1 - 1.3 K/UL    Absolute Eos # 0.2 0.0 - 0.8 K/UL    Basophils Absolute 0.0 0.0 - 0.2 K/UL    Absolute Immature Granulocyte 0.1 0.0 - 0.5 K/UL   Basic Metabolic Panel    Collection Time: 06/28/22  4:06 AM   Result Value Ref Range    Sodium 138 138 - 145 mmol/L    Potassium 4.0 3.5 - 5.1 mmol/L    Chloride 102 98 - 107 mmol/L    CO2 33 (H) 21 - 32 mmol/L    Anion Gap 3 (L) 7 - 16 mmol/L    Glucose 86 65 - 100 mg/dL    BUN 26 (H) 8 - 23 MG/DL    CREATININE 1.20 0.8 - 1.5 MG/DL    GFR African American >60 >60 ml/min/1.73m2    GFR Non- >60 >60 ml/min/1.73m2    Calcium 8.7 8.3 - 10.4 MG/DL        CT ABDOMEN PELVIS W IV CONTRAST Additional Contrast? Radiologist Recommendation (IV and oral contrast)   Final Result   1. No definite new source for the patient's bacteremia. There is no definite   intra-abdominal abscess or new fluid collection in the pelvis. There is a small   right presacral fluid collection that contains a small pocket of air that   measures roughly 3 cm that has decreased in size from the prior exam.   2. The bladder wall remains thickened but is decompressed primarily by John   catheter. Cystitis remains a consideration. The previous posterior bladder wall   rupture now has an appearance more in keeping with phlegmon/chronic wall   thickening without significant fluid within the rupture.  No significant free fluid in the pelvis at all. 3. Nonspecific gallbladder wall thickening. US RETROPERITONEAL LIMITED   Final Result   No hydronephrosis or obvious renal calculi. Possible slight   increased echogenicity of the kidneys suggesting underlying chronic renal   disease. John catheter decompresses the bladder. Small echogenic foci in the   bladder lumen possibly debris in the bladder. Mild bladder wall thickening may   be related to incomplete distention. XR CHEST PORTABLE   Final Result   No evidence of an acute intrathoracic process.               Condition: Improved    Disposition: STR    Consultants During This Hospitalization: Infectious disease            Spent 31 minutes on discharge services

## 2022-06-28 NOTE — CARE COORDINATION
06/28/22 1319   Services At/After Discharge   Transition of Care Consult (CM Consult) SNF   Partner SNF Yes   [address] (SNF)   [address] Provided? No   Mode of Transport at Discharge [address] [address] Time of Discharge 0230   Confirm Follow Up Transport Other (see comment)   Condition of Participation: Discharge Planning   The Plan for Transition of Care is related to the following treatment goals: patient needs rehab   The Patient and/or Patient Representative was provided with a Choice of Provider? Patient   Freedom of Choice list was provided with basic dialogue that supports the patient's individualized plan of care/goals, treatment preferences, and shares the quality data associated with the providers? Yes   Patient is discharging via [place] to [facility]. Room and report line were given to RN. Patient was made aware of transport time.

## 2022-06-28 NOTE — PROGRESS NOTES
Patient resting quietly in bed. Respirations even and unlabored. On room air. Colostomy and John in place. No signs of distress. No needs expressed. Report given to GABI Nunez RN.

## 2022-06-28 NOTE — PROGRESS NOTES
Received report from [facility]. Patient awake and in bed. A&O x4. Respirations present. On room air. Colostomy in place and clean, dry, and intact. John patent and draining. No signs of distress. No needs expressed. Bed low and locked. Call light within reach. Will continue to monitor.

## 2022-06-29 ENCOUNTER — CARE COORDINATION (OUTPATIENT)
Dept: CARE COORDINATION | Facility: CLINIC | Age: 77
End: 2022-06-29

## 2022-06-29 ENCOUNTER — HOSPITAL ENCOUNTER (OUTPATIENT)
Dept: PHYSICAL THERAPY | Age: 77
Setting detail: RECURRING SERIES
End: 2022-06-29
Payer: MEDICARE

## 2022-06-29 NOTE — CARE COORDINATION
No ABDIEL call indicated at this time due to patient being discharged to a SNF preferred provider network ([facility]) for STR. Will forward to SNF RN Coordinator to include in weekly care coordination call.

## 2022-06-29 NOTE — CARE COORDINATION
Notification of hospital discharge to [place] [id number] for STR. Will follow progress and notify [address]  IDT of admission.

## 2022-07-07 ENCOUNTER — HOSPITAL ENCOUNTER (INPATIENT)
Age: 77
LOS: 1 days | Discharge: NURSING, REHAB OR LONG-TERM CARE | DRG: 699 | End: 2022-07-08
Admitting: HOSPITALIST
Payer: MEDICARE

## 2022-07-07 ENCOUNTER — APPOINTMENT (OUTPATIENT)
Dept: CT IMAGING | Age: 77
DRG: 699 | End: 2022-07-07
Payer: MEDICARE

## 2022-07-07 DIAGNOSIS — K56.7 ILEUS (HCC): ICD-10-CM

## 2022-07-07 DIAGNOSIS — T83.511A URINARY TRACT INFECTION ASSOCIATED WITH INDWELLING URETHRAL CATHETER, INITIAL ENCOUNTER (HCC): Primary | ICD-10-CM

## 2022-07-07 DIAGNOSIS — N39.0 URINARY TRACT INFECTION ASSOCIATED WITH INDWELLING URETHRAL CATHETER, INITIAL ENCOUNTER (HCC): Primary | ICD-10-CM

## 2022-07-07 DIAGNOSIS — N13.30 BILATERAL HYDRONEPHROSIS: ICD-10-CM

## 2022-07-07 PROBLEM — R33.8 ACUTE URINARY RETENTION: Status: ACTIVE | Noted: 2022-07-07

## 2022-07-07 PROBLEM — N18.9 ACUTE KIDNEY INJURY SUPERIMPOSED ON CKD (HCC): Status: ACTIVE | Noted: 2021-09-15

## 2022-07-07 LAB
ANION GAP SERPL CALC-SCNC: 10 MMOL/L (ref 7–16)
APPEARANCE UR: ABNORMAL
BACTERIA URNS QL MICRO: NEGATIVE /HPF
BASOPHILS # BLD: 0.1 K/UL (ref 0–0.2)
BASOPHILS NFR BLD: 1 % (ref 0–2)
BILIRUB UR QL: NEGATIVE
BUN SERPL-MCNC: 33 MG/DL (ref 8–23)
CALCIUM SERPL-MCNC: 9.1 MG/DL (ref 8.3–10.4)
CASTS URNS QL MICRO: ABNORMAL /LPF
CHLORIDE SERPL-SCNC: 99 MMOL/L (ref 98–107)
CO2 SERPL-SCNC: 25 MMOL/L (ref 21–32)
COLOR UR: ABNORMAL
CREAT SERPL-MCNC: 1.8 MG/DL (ref 0.8–1.5)
DIFFERENTIAL METHOD BLD: ABNORMAL
EOSINOPHIL # BLD: 0.1 K/UL (ref 0–0.8)
EOSINOPHIL NFR BLD: 2 % (ref 0.5–7.8)
EPI CELLS #/AREA URNS HPF: ABNORMAL /HPF
ERYTHROCYTE [DISTWIDTH] IN BLOOD BY AUTOMATED COUNT: 13.2 % (ref 11.9–14.6)
GLUCOSE SERPL-MCNC: 91 MG/DL (ref 65–100)
GLUCOSE UR STRIP.AUTO-MCNC: NEGATIVE MG/DL
HCT VFR BLD AUTO: 38.3 % (ref 41.1–50.3)
HGB BLD-MCNC: 11.9 G/DL (ref 13.6–17.2)
HGB UR QL STRIP: ABNORMAL
IMM GRANULOCYTES # BLD AUTO: 0 K/UL (ref 0–0.5)
IMM GRANULOCYTES NFR BLD AUTO: 0 % (ref 0–5)
KETONES UR QL STRIP.AUTO: NEGATIVE MG/DL
LACTATE SERPL-SCNC: 1.3 MMOL/L (ref 0.4–2)
LEUKOCYTE ESTERASE UR QL STRIP.AUTO: ABNORMAL
LYMPHOCYTES # BLD: 0.9 K/UL (ref 0.5–4.6)
LYMPHOCYTES NFR BLD: 14 % (ref 13–44)
MCH RBC QN AUTO: 28.7 PG (ref 26.1–32.9)
MCHC RBC AUTO-ENTMCNC: 31.1 G/DL (ref 31.4–35)
MCV RBC AUTO: 92.3 FL (ref 79.6–97.8)
MONOCYTES # BLD: 0.7 K/UL (ref 0.1–1.3)
MONOCYTES NFR BLD: 11 % (ref 4–12)
NEUTS SEG # BLD: 4.5 K/UL (ref 1.7–8.2)
NEUTS SEG NFR BLD: 72 % (ref 43–78)
NITRITE UR QL STRIP.AUTO: NEGATIVE
NRBC # BLD: 0 K/UL (ref 0–0.2)
PH UR STRIP: 5 [PH] (ref 5–9)
PLATELET # BLD AUTO: 343 K/UL (ref 150–450)
PMV BLD AUTO: 9.4 FL (ref 9.4–12.3)
POTASSIUM SERPL-SCNC: 4.2 MMOL/L (ref 3.5–5.1)
PROCALCITONIN SERPL-MCNC: 0.13 NG/ML (ref 0–0.49)
PROT UR STRIP-MCNC: 100 MG/DL
RBC # BLD AUTO: 4.15 M/UL (ref 4.23–5.6)
RBC #/AREA URNS HPF: >100 /HPF
SODIUM SERPL-SCNC: 134 MMOL/L (ref 136–145)
SP GR UR REFRACTOMETRY: 1.02 (ref 1–1.02)
UROBILINOGEN UR QL STRIP.AUTO: 0.2 EU/DL (ref 0.2–1)
WBC # BLD AUTO: 6.2 K/UL (ref 4.3–11.1)
WBC URNS QL MICRO: ABNORMAL /HPF

## 2022-07-07 PROCEDURE — 99222 1ST HOSP IP/OBS MODERATE 55: CPT | Performed by: UROLOGY

## 2022-07-07 PROCEDURE — 6370000000 HC RX 637 (ALT 250 FOR IP): Performed by: HOSPITALIST

## 2022-07-07 PROCEDURE — 80048 BASIC METABOLIC PNL TOTAL CA: CPT

## 2022-07-07 PROCEDURE — 0T2BX0Z CHANGE DRAINAGE DEVICE IN BLADDER, EXTERNAL APPROACH: ICD-10-PCS | Performed by: HOSPITALIST

## 2022-07-07 PROCEDURE — 97161 PT EVAL LOW COMPLEX 20 MIN: CPT

## 2022-07-07 PROCEDURE — 6360000002 HC RX W HCPCS: Performed by: HOSPITALIST

## 2022-07-07 PROCEDURE — 97535 SELF CARE MNGMENT TRAINING: CPT

## 2022-07-07 PROCEDURE — 97530 THERAPEUTIC ACTIVITIES: CPT

## 2022-07-07 PROCEDURE — 74176 CT ABD & PELVIS W/O CONTRAST: CPT

## 2022-07-07 PROCEDURE — 87040 BLOOD CULTURE FOR BACTERIA: CPT

## 2022-07-07 PROCEDURE — 97165 OT EVAL LOW COMPLEX 30 MIN: CPT

## 2022-07-07 PROCEDURE — 1100000000 HC RM PRIVATE

## 2022-07-07 PROCEDURE — 81001 URINALYSIS AUTO W/SCOPE: CPT

## 2022-07-07 PROCEDURE — 99285 EMERGENCY DEPT VISIT HI MDM: CPT

## 2022-07-07 PROCEDURE — 2580000003 HC RX 258: Performed by: HOSPITALIST

## 2022-07-07 PROCEDURE — 85025 COMPLETE CBC W/AUTO DIFF WBC: CPT

## 2022-07-07 PROCEDURE — 83605 ASSAY OF LACTIC ACID: CPT

## 2022-07-07 PROCEDURE — 84145 PROCALCITONIN (PCT): CPT

## 2022-07-07 PROCEDURE — 51798 US URINE CAPACITY MEASURE: CPT

## 2022-07-07 RX ORDER — LEVOTHYROXINE SODIUM 0.05 MG/1
125 TABLET ORAL
Status: DISCONTINUED | OUTPATIENT
Start: 2022-07-07 | End: 2022-07-08 | Stop reason: HOSPADM

## 2022-07-07 RX ORDER — SODIUM CHLORIDE 9 MG/ML
INJECTION, SOLUTION INTRAVENOUS CONTINUOUS
Status: DISCONTINUED | OUTPATIENT
Start: 2022-07-07 | End: 2022-07-07

## 2022-07-07 RX ORDER — MORPHINE SULFATE 2 MG/ML
2 INJECTION, SOLUTION INTRAMUSCULAR; INTRAVENOUS EVERY 4 HOURS PRN
Status: DISCONTINUED | OUTPATIENT
Start: 2022-07-07 | End: 2022-07-08 | Stop reason: HOSPADM

## 2022-07-07 RX ORDER — HYDRALAZINE HYDROCHLORIDE 25 MG/1
25 TABLET, FILM COATED ORAL EVERY 8 HOURS PRN
Status: DISCONTINUED | OUTPATIENT
Start: 2022-07-07 | End: 2022-07-08 | Stop reason: HOSPADM

## 2022-07-07 RX ORDER — SENNA AND DOCUSATE SODIUM 50; 8.6 MG/1; MG/1
2 TABLET, FILM COATED ORAL 2 TIMES DAILY
Status: DISCONTINUED | OUTPATIENT
Start: 2022-07-07 | End: 2022-07-08 | Stop reason: HOSPADM

## 2022-07-07 RX ORDER — SODIUM CHLORIDE 9 MG/ML
INJECTION, SOLUTION INTRAVENOUS PRN
Status: DISCONTINUED | OUTPATIENT
Start: 2022-07-07 | End: 2022-07-08 | Stop reason: HOSPADM

## 2022-07-07 RX ORDER — HEPARIN SODIUM 5000 [USP'U]/ML
5000 INJECTION, SOLUTION INTRAVENOUS; SUBCUTANEOUS EVERY 12 HOURS
Status: DISCONTINUED | OUTPATIENT
Start: 2022-07-07 | End: 2022-07-08 | Stop reason: HOSPADM

## 2022-07-07 RX ORDER — SODIUM CHLORIDE 0.9 % (FLUSH) 0.9 %
5-40 SYRINGE (ML) INJECTION EVERY 12 HOURS SCHEDULED
Status: DISCONTINUED | OUTPATIENT
Start: 2022-07-07 | End: 2022-07-08 | Stop reason: HOSPADM

## 2022-07-07 RX ORDER — OXYBUTYNIN CHLORIDE 10 MG/1
10 TABLET, EXTENDED RELEASE ORAL DAILY
Status: ON HOLD | COMMUNITY
Start: 2022-06-11 | End: 2022-10-23 | Stop reason: HOSPADM

## 2022-07-07 RX ORDER — SODIUM CHLORIDE 0.9 % (FLUSH) 0.9 %
5-40 SYRINGE (ML) INJECTION PRN
Status: DISCONTINUED | OUTPATIENT
Start: 2022-07-07 | End: 2022-07-08 | Stop reason: HOSPADM

## 2022-07-07 RX ORDER — POLYETHYLENE GLYCOL 3350 17 G/17G
17 POWDER, FOR SOLUTION ORAL DAILY
Status: DISCONTINUED | OUTPATIENT
Start: 2022-07-07 | End: 2022-07-08 | Stop reason: HOSPADM

## 2022-07-07 RX ORDER — ONDANSETRON 4 MG/1
4 TABLET, ORALLY DISINTEGRATING ORAL EVERY 8 HOURS PRN
Status: DISCONTINUED | OUTPATIENT
Start: 2022-07-07 | End: 2022-07-08 | Stop reason: HOSPADM

## 2022-07-07 RX ORDER — DEXTROSE AND SODIUM CHLORIDE 5; .9 G/100ML; G/100ML
INJECTION, SOLUTION INTRAVENOUS CONTINUOUS
Status: DISCONTINUED | OUTPATIENT
Start: 2022-07-07 | End: 2022-07-08 | Stop reason: HOSPADM

## 2022-07-07 RX ORDER — PANTOPRAZOLE SODIUM 40 MG/1
40 TABLET, DELAYED RELEASE ORAL
Status: DISCONTINUED | OUTPATIENT
Start: 2022-07-07 | End: 2022-07-08 | Stop reason: HOSPADM

## 2022-07-07 RX ORDER — CLONAZEPAM 1 MG/1
4 TABLET ORAL NIGHTLY PRN
Status: DISCONTINUED | OUTPATIENT
Start: 2022-07-07 | End: 2022-07-08 | Stop reason: HOSPADM

## 2022-07-07 RX ORDER — 0.9 % SODIUM CHLORIDE 0.9 %
1000 INTRAVENOUS SOLUTION INTRAVENOUS
Status: COMPLETED | OUTPATIENT
Start: 2022-07-07 | End: 2022-07-07

## 2022-07-07 RX ORDER — ACETAMINOPHEN 325 MG/1
650 TABLET ORAL EVERY 6 HOURS PRN
Status: DISCONTINUED | OUTPATIENT
Start: 2022-07-07 | End: 2022-07-08 | Stop reason: HOSPADM

## 2022-07-07 RX ORDER — CARVEDILOL 6.25 MG/1
6.25 TABLET ORAL 2 TIMES DAILY WITH MEALS
Status: DISCONTINUED | OUTPATIENT
Start: 2022-07-07 | End: 2022-07-08 | Stop reason: HOSPADM

## 2022-07-07 RX ORDER — ACETAMINOPHEN 650 MG/1
650 SUPPOSITORY RECTAL EVERY 6 HOURS PRN
Status: DISCONTINUED | OUTPATIENT
Start: 2022-07-07 | End: 2022-07-08 | Stop reason: HOSPADM

## 2022-07-07 RX ORDER — ONDANSETRON 2 MG/ML
4 INJECTION INTRAMUSCULAR; INTRAVENOUS EVERY 4 HOURS PRN
Status: DISCONTINUED | OUTPATIENT
Start: 2022-07-07 | End: 2022-07-08 | Stop reason: HOSPADM

## 2022-07-07 RX ORDER — HYDROCODONE BITARTRATE AND ACETAMINOPHEN 5; 325 MG/1; MG/1
1 TABLET ORAL EVERY 6 HOURS PRN
Status: DISCONTINUED | OUTPATIENT
Start: 2022-07-07 | End: 2022-07-08 | Stop reason: HOSPADM

## 2022-07-07 RX ADMIN — HEPARIN SODIUM 5000 UNITS: 5000 INJECTION INTRAVENOUS; SUBCUTANEOUS at 09:01

## 2022-07-07 RX ADMIN — HEPARIN SODIUM 5000 UNITS: 5000 INJECTION INTRAVENOUS; SUBCUTANEOUS at 21:51

## 2022-07-07 RX ADMIN — SODIUM CHLORIDE, PRESERVATIVE FREE 10 ML: 5 INJECTION INTRAVENOUS at 10:21

## 2022-07-07 RX ADMIN — DEXTROSE AND SODIUM CHLORIDE: 5; 900 INJECTION, SOLUTION INTRAVENOUS at 21:50

## 2022-07-07 RX ADMIN — CLONAZEPAM 4 MG: 1 TABLET ORAL at 21:52

## 2022-07-07 RX ADMIN — CARVEDILOL 6.25 MG: 6.25 TABLET, FILM COATED ORAL at 21:51

## 2022-07-07 RX ADMIN — CEFEPIME 2000 MG: 2 INJECTION, POWDER, FOR SOLUTION INTRAVENOUS at 12:06

## 2022-07-07 RX ADMIN — PIPERACILLIN AND TAZOBACTAM 4500 MG: 4; .5 INJECTION, POWDER, LYOPHILIZED, FOR SOLUTION INTRAVENOUS at 09:01

## 2022-07-07 RX ADMIN — OLANZAPINE 7.5 MG: 5 TABLET, FILM COATED ORAL at 21:52

## 2022-07-07 RX ADMIN — SODIUM CHLORIDE 1000 ML: 9 INJECTION, SOLUTION INTRAVENOUS at 09:01

## 2022-07-07 RX ADMIN — SODIUM CHLORIDE, PRESERVATIVE FREE 10 ML: 5 INJECTION INTRAVENOUS at 21:52

## 2022-07-07 RX ADMIN — CARVEDILOL 6.25 MG: 6.25 TABLET, FILM COATED ORAL at 11:45

## 2022-07-07 RX ADMIN — PANTOPRAZOLE SODIUM 40 MG: 40 TABLET, DELAYED RELEASE ORAL at 09:02

## 2022-07-07 RX ADMIN — DEXTROSE AND SODIUM CHLORIDE: 5; 900 INJECTION, SOLUTION INTRAVENOUS at 12:06

## 2022-07-07 ASSESSMENT — ENCOUNTER SYMPTOMS
EYES NEGATIVE: 1
RESPIRATORY NEGATIVE: 1
VOMITING: 0
GASTROINTESTINAL NEGATIVE: 1

## 2022-07-07 ASSESSMENT — PAIN - FUNCTIONAL ASSESSMENT: PAIN_FUNCTIONAL_ASSESSMENT: NONE - DENIES PAIN

## 2022-07-07 ASSESSMENT — PAIN SCALES - GENERAL: PAINLEVEL_OUTOF10: 0

## 2022-07-07 NOTE — ED NOTES
TRANSFER - OUT REPORT:    Verbal report given to Brian Esteves RN on [address]  being transferred to  for routine progression of patient care       Report consisted of patient's Situation, Background, Assessment and   Recommendations(SBAR). Information from the following report(s) ED SBAR was reviewed with the receiving nurse. Lines:   Peripheral IV 07/07/22 Right Antecubital (Active)   Site Assessment Clean, dry & intact 07/07/22 0302   Line Status Blood return noted;Normal saline locked 07/07/22 0302   Phlebitis Assessment No symptoms 07/07/22 0302   Infiltration Assessment 0 07/07/22 0302   Dressing Status New dressing applied 07/07/22 0302   Dressing Type Transparent 07/07/22 0302       Peripheral IV 07/07/22 Left Antecubital (Active)        Opportunity for questions and clarification was provided.              Jose Elias Sawyer RN  07/07/22 4560

## 2022-07-07 NOTE — CONSULTS
[address]    Name:  Weston Chaney  MR#:  [id number]  :  1945  ACCOUNT #:  [de-identified]  DATE OF SERVICE:  2022      CONSULTING PHYSICIAN:  Dr. Alex Morse:  Distended bladder with hydronephrosis. HISTORY OF PRESENT ILLNESS:  A 26-year-old male followed by Dr. Ara Forbes of our office. The patient has history of proctectomy in 2021. A cystotomy occurred during surgery and it was repaired intraoperatively. It did not heal well and the patient had developed a communication between the abscess and the bladder. He also has a right ureteral stent. Previously, he had bilateral nephrostomy tubes to try to divert the urine. He saw Dr. Ara Forbes in the office on 2022. 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 The patient was at home and states that for several days, his catheter has not been draining well. He has had some weakness and was felt to be having symptoms consistent with sepsis. The right stent was changed on 05/10/2022. The patient came to the ER. A CT scan was done. This shows moderate right hydronephrosis despite the presence of the stent. The hydronephrosis was not present on a CT dated 2022. There was mild left hydronephrosis and an overdistended urinary bladder. The John catheter balloon was in the prostatic urethra. There was a dilated loop of small bowel in the left hemiabdomen likely due to ileus. Nursing staff tells me that the catheter was removed and was replaced with a new catheter. The patient has had good urine output per the new catheter. He has put out 1400 mL over the last shift. Currently no complaints of fever or chills. PAST MEDICAL HISTORY:  1. History of rectal cancer treated with surgery and radiation. 2.  Cystotomy with enterovesical fistula, managed with indwelling John.   3.  History of right indwelling ureteral stent. PAST SURGERIES:  1. History of total colectomy by Dr. Janene Leslie in the past.  2.  Laparoscopic low anterior resection with coloproctostomy, mobilization of splenic flexure, diverting a loop ileostomy. 3.  Then he had a total colectomy done for leak. SOCIAL HISTORY:  He was a former smoker but quit over 50 years ago. MEDICATIONS AT HOME:  1. Tylenol. 2.  Coreg. 3.  Klonopin. 4.  Pepcid. 5.  Synthroid. 6.  Zyprexa. PHYSICAL EXAMINATION:  VITAL SIGNS:  Temp 97.6, pulse 86, respiration 18, /84. ABDOMEN:  Soft and nontender. There is a colostomy on the left lower quadrant. He has a [place] catheter in place. It appears to be draining clear urine. I was able to insert it and then pull it back out several centimeters and it appears to be in good position. LABORATORY DATA:  Creatinine was 1.8 on 07/07/2022, up from 1.2 on 06/28/2022. UA shows 20-50 WBC, greater than 100 RBC. ASSESSMENT:  1. Right hydronephrosis secondary to distended bladder. This is likely due to the misplaced John with the balloon residing in the prostatic fossa. This probably also explains his recent symptoms of decreased urine output and urinary tract infection-type symptoms. The John has currently been replaced and appears to be draining appropriately. 2.  History of enterovesical fistula, managed with chronic John. He has a chronic right ureteral stent as well. This was changed in 05/2022. RECOMMENDATIONS:  It looks like the catheter is now in the proper position. I have asked the nursing staff to do a postvoid residual with the ultrasound. The patient can follow up with Dr. Mayelin Rubi.       MD ANTHONY [id number]  D:  07/07/2022 14:32  T:  07/07/2022 17:08  JOB #:  [id number]

## 2022-07-07 NOTE — ED TRIAGE NOTES
Pt presents to the ED from [facility] via EMS with c/o urinary retention over the last 4 hours. Pt states the carr had been placed a few weeks ago for UTI with sepsis. Pt denies cough, fever, abdominal pain, N/V/D, and any other complaints.

## 2022-07-07 NOTE — PROGRESS NOTES
PHYSICAL THERAPY Initial Assessment, Daily Note and PM  (Link to Caseload Tracking: PT Visit Days : 1  Acknowledge Orders  Time In/Out  PT Charge Capture  Rehab Caseload Tracker    Daljit Desir is a 64 y.o. male   PRIMARY DIAGNOSIS: Acute urinary retention  Ileus ([facility] [K56.7]  Bilateral hydronephrosis [N13.30]  Acute urinary retention [R33.8]  Urinary tract infection associated with indwelling urethral catheter, initial encounter ([facility] [A17.328O, N39.0]       Reason for Referral: Generalized Muscle Weakness (M62.81)  Other lack of cordination (R27.8)  Difficulty in walking, Not elsewhere classified (R26.2)  Other abnormalities of gait and mobility (R26.89)  Inpatient: Payor: MEDICARE / Plan: MEDICARE PART A AND B / Product Type: *No Product type* /     ASSESSMENT:     REHAB RECOMMENDATIONS:   Recommendation to date pending progress:  Setting:   Short-term Rehab    Equipment:     To Be Determined     ASSESSMENT:  Mr. Parvin Dennis presents in supine. He requires significant extra time for all mobility today. Upon entering, Pnt is agreeable to PT treatment. he reports no pain  at rest. Pnt performed supine > sit with CGA, sitting EOB with CGA sitting balance control. Sit > stand with CGA using HHA. Gait 2  x 120 ft with CGA and extra time, cues for step length. Gait is noted to be slowed and arduous and pt has one LOB corrected by this therapist. Stand > sit with CGA, followed by positioning for comfort. Pt reports being too tired today to practice stairs, but has 12 steps at home up to his bathroom. At end of session pt supine in bed with all needs within reach, alarm activated for safety, RN notified. Overall,  He  presents as functioning below his baseline, with deficits in mobility including transfers, gait, balance, and activity tolerance. Pt will continue to benefit from skilled therapy services to address stated deficits to promote return to highest level of function, independence, and safety.  Will continue to follow.          [address] [facility] [address] [address] AM-PAC 6 Clicks Basic Mobility Inpatient Short Form  AM-PAC Mobility Inpatient   How much difficulty turning over in bed?: A Little  How much difficulty sitting down on / standing up from a chair with arms?: A Little  How much difficulty moving from lying on back to sitting on side of bed?: A Little  How much help from another person moving to and from a bed to a chair?: A Little  How much help from another person needed to walk in hospital room?: A Lot  How much help from another person for climbing 3-5 steps with a railing?: A Lot  AM-PAC Inpatient Mobility Raw Score : 16  AM-PAC Inpatient T-Scale Score : 40.78  Mobility Inpatient CMS 0-100% Score: 54.16  Mobility Inpatient CMS G-Code Modifier : CK    SUBJECTIVE:   Mr. Rosalba Gillette states, \"I need [facility] go back to [facility]"     Social/Functional Lives With: Family  Type of Home: [facility] [employer]  Home Layout: Multi-level  Home Access: Stairs to enter with rails  Bathroom Shower/Tub: Tub/Shower unit  Bathroom Toilet: Standard  Bathroom Accessibility: Accessible  Receives Help From: Family  ADL Assistance: Needs assistance  Toileting: Needs assistance  Homemaking Assistance: Needs assistance  Homemaking Responsibilities: No  Ambulation Assistance: Needs assistance  Transfer Assistance: Independent  Active : Yes  Mode of Transportation: Car  Occupation: Retired    OBJECTIVE:     PAIN: Delgado Males / O2: Jessie Bores / Jonas Robles / Carmel Lowing:   Pre Treatment:   Pain Assessment: None - Denies Pain      Post Treatment: 0 Vitals        Oxygen      None    RESTRICTIONS/PRECAUTIONS:                    GROSS EVALUATION: Intact Impaired (Comments):   AROM []   decreased global extension   PROM []    Strength []   decreased knee extensors    Balance []   trunk sway   Posture [] Forward Head  Rounded Shoulders   Sensation [x]     Coordination [x]      Tone [x]     Edema [x]    Activity Tolerance [] Patient limited by fatigue    []      COGNITION/  PERCEPTION: Intact Impaired (Comments):   Orientation [x]     Vision [x]     Hearing [x]     Cognition  [x]       MOBILITY: I Mod I S SBA CGA Min Mod Max Total  NT x2 Comments:   Bed Mobility    Rolling [] [] [] [] [x] [] [] [] [] [] []    Supine to Sit [] [] [] [] [x] [] [] [] [] [] []    Scooting [] [] [] [] [x] [] [] [] [] [] []    Sit to Supine [] [] [] [] [x] [] [] [] [] [] []    Transfers    Sit to Stand [] [] [] [] [x] [] [] [] [] [] []    Bed to Chair [] [] [] [] [] [] [] [] [] [x] []    Stand to Sit [] [] [] [] [x] [] [] [] [] [] []     [] [] [] [] [] [] [] [] [] [] []    I=Independent, Mod I=Modified Independent, S=Supervision, SBA=Standby Assistance, CGA=Contact Guard Assistance,   Min=Minimal Assistance, Mod=Moderate Assistance, Max=Maximal Assistance, Total=Total Assistance, NT=Not Tested    GAIT: I Mod I S SBA CGA Min Mod Max Total  NT x2 Comments:   Level of Assistance [] [] [] [] [x] [] [] [] [] [] []    Distance  (2x120) feet    DME Rolling Walker    Gait Quality Decreased zoe , Decreased step clearance, Decreased step length and Decreased stance    Weightbearing Status      Stairs      I=Independent, Mod I=Modified Independent, S=Supervision, SBA=Standby Assistance, CGA=Contact Guard Assistance,   Min=Minimal Assistance, Mod=Moderate Assistance, Max=Maximal Assistance, Total=Total Assistance, NT=Not Tested    PLAN:   ACUTE PHYSICAL THERAPY GOALS:   (Developed with and agreed upon by patient and/or caregiver. )    LTG:  (1.)Mr. Nolasco will move from supine to sit and sit to supine , scoot up and down and roll side to side in bed with INDEPENDENT within 7 treatment day(s). (2.)Mr. Nolasco will transfer from bed to chair and chair to bed with SUPERVISION using the least restrictive device within 7 treatment day(s). (3.)Mr. Nolasco will ambulate with CONTACT GUARD ASSIST for 2 x 250 feet with the least restrictive device within 7 treatment day(s). (4.)Mr. Nolasco will tolerate at least 23 min of dynamic standing activity to assist standing ADLs with the least restrictive device within 7 treatment days. (5.)Mr. Nolasco will climb at least 12 steps with MODERATE ASSIST with the least restrictive device within 7 treatment days. ________________________________________________________________________________________________        FREQUENCY AND DURATION: 3 times/week for duration of hospital stay or until stated goals are met, whichever comes first.    THERAPY PROGNOSIS: Excellent    PROBLEM LIST:   (Skilled intervention is medically necessary to address:)  Decreased ADL/Functional Activities  Decreased Activity Tolerance  Decreased AROM/PROM  Decreased Balance  Decreased Coordination  Decreased Gait Ability  Decreased Safety Awareness  Decreased Strength  Decreased Transfer Abilities  Increased Pain INTERVENTIONS PLANNED:   (Benefits and precautions of physical therapy have been discussed with the patient.)  Self Care Training  Therapeutic Activity  Therapeutic Exercise/HEP  Neuromuscular Re-education  Gait Training  Manual Therapy  Modalities  Education       TREATMENT:   EVALUATION: LOW COMPLEXITY: (Untimed Charge)    TREATMENT:   Therapeutic Activity (9 Minutes): Therapeutic activity included Rolling, Supine to Sit, Sit to Supine, Scooting, Ambulation on level ground and Standing balance to improve functional Activity tolerance, Balance, Coordination, Mobility, Strength and ROM.     TREATMENT GRID:  N/A    AFTER TREATMENT PRECAUTIONS: Alarm Activated, Bed, Bed/Chair Locked and Needs within reach    INTERDISCIPLINARY COLLABORATION:  RN/ PCT and PT/ PTA    EDUCATION: Education Given To: Patient    TIME IN/OUT:  Time In: 1358  Time Out: [address]  Minutes: [address]oss[address] Holder PT

## 2022-07-07 NOTE — CARE COORDINATION
PADMA, cM:  patient was admitted on 6/21/2022 for UTI with Dx of Sepsis with acute organ dysfunction. ID was consulted during this admission. Patient was discharged 6/28/2022 and started on Bactrim DS, Augmentin, and Pepcid. Patient's Klonopin was changed and patient was instructed to stop Vitamin C, Cholecalciferol, and cyanocobalamin. Patient was readmitted on 7/7/2022 for urinary retention. 07/07/22 1437   Readmission Assessment   Number of Days since last admission? 8-30 days   Previous Disposition SNF   Who is being Interviewed Patient   What was the patient's/caregiver's perception as to why they think they needed to return back to the hospital? Other (Comment)  (I could not pee)   Did you visit your Primary Care Physician after you left the hospital, before you returned this time? Yes  (In facility MD)   Did you see a specialist, such as Cardiac, Pulmonary, Orthopedic Physician, etc. after you left the hospital? No   Who advised the patient to return to the hospital? Skilled Unit   Does the patient report anything that got in the way of taking their medications? No   In our efforts to provide the best possible care to you and others like you, can you think of anything that we could have done to help you after you left the hospital the first time, so that you might not have needed to return so soon?  Other (Comment)  (No)

## 2022-07-07 NOTE — CARE COORDINATION
MSN, CM:  patient states no changes from last admission other than going to [facility] last discharge. Patient states his discharge plan is back to [facility] as soon as possible. PT/OT consulted for evaluation and recommendations. Case Management will continue to follow for any discharge needs. 07/07/22 6584   Service Assessment   Patient Orientation Alert and Oriented   Cognition Alert   History Provided By Patient   Primary Ankit Manjarrez is: Legal Next of Kin   PCP Verified by CM Yes   Last Visit to PCP Within last 3 months   Prior Functional Level Assistance with the following:   Current Functional Level Assistance with the following:   Can patient return to prior living arrangement Yes   Ability to make needs known: Good   Family able to assist with home care needs: Yes   Would you like for me to discuss the discharge plan with any other family members/significant others, and if so, who? No   Financial Resources [employer] Other (Comment)  (SNF - [facility])   Social/Functional History   Lives With Family   Type of Home Condo   Home Layout Multi-level   Home Access Stairs to enter with rails   Bathroom Shower/Tub Tub/Shower unit   Bathroom Toilet Standard   Bathroom Accessibility Accessible   Receives Help From Family   ADL Assistance Needs assistance   Toileting Needs assistance   [address] Needs assistance   Homemaking Responsibilities No   Ambulation Assistance Needs assistance   Transfer Assistance Independent   Active  Yes   Mode of Transportation Car   Occupation Retired   Discharge Planning   Type of [address] Prior To Admission None   Potential Assistance Needed N/A   DME Ordered?  No   Potential Assistance Purchasing Medications No   Patient expects to be discharged to: Skilled nursing facility   One/Two Story Residence Two story   # of Interior Steps 12   Interior Rails Right   Lift Chair Available No   History of falls?  1

## 2022-07-07 NOTE — H&P
Hospitalist History and Physical   Admit Date:  2022  3:02 AM   Name:  Tina Nolasco   Age:  64 y.o. Sex:  male  :  1945   MRN:  [id number]   Room:  ER01/01    Presenting Complaint: Urinary Retention     Reason(s) for Admission: Acute urinary retention [R33.8]     History of Present Illness:   Shravan Nguyen is a 64 y.o. male with medical history of rectal cancer status post sigmoidectomy with colostomy, MDD, urinary retention with chronic John, recent sepsis secondary to polymicrobial bacteremia in the setting of catheter induced UTI brought in from [facility] in view of urinary retention. Patient is alert awake at the time of my evaluation. Reports feeling okay and wants to go back to facility and has been admitted. Patient reports feeling mild suprapubic discomfort which has resolved now after catheter was changed in the ER. Denies any nausea, vomiting, fever chills, cough, shortness of breath. Pt completed 10 days of augmentin and bactrim. ER work-up remarkable for Pro-Garfield of 0.13, lactic acid 1.3, creatinine 1.8 (baseline 1-1.2). CTAP showed moderate right hydronephrosis despite presence of right-sided nephroureteral stent, mild left hydronephrosis, over distended urinary bladder with John catheter balloon in the prostatic urethra, dilated loop of small bowel and left hemiabdomen likely due to ileus. UA with 20-50 WBC, greater than 100 RBC, moderate leukocyte esterase. Review of Systems:  10 systems reviewed and negative except as noted in HPI. Assessment & Plan:     Principal Problem:    Acute urinary retention  Plan: :  Urinary retention likely due to John catheter. CTAP showed John catheter balloon in prostatic urethra. John catheter exchanged in ER, urine is slightly turbid with pinkish hue. Send urine culture.   Started on empiric IV cefepime    Active Problems:    Mild episode of recurrent major depressive disorder ([facility] [address]  Plan: :  Zyprexa 7.5 mg nightly. Attention to ileostomy [facility])  Plan: We will consult wound care. Ileus ([facility] [address]  Plan: N.p.o. for now except for meds and sips of water. Started on bowel regimen including MiraLAX daily and Sharri-Colace twice daily. Will consider starting on full liquid diet by dinnertime. KUB in AM.      Hydronephrosis of right kidney  Plan: 7/7:  Status post right nephroureteral stent  Consulted urology  Mild KEVAN  Continue IV fluids D5 NS at 100 cc/h. John catheter exchanged in ER today. Urinary tract infection associated with indwelling urethral catheter ([address]  Plan: Follow-up with urine and blood culture. Continue empiric IV cefepime. Acute UTI (urinary tract infection)  Plan: Continue IV cefepime. PT and OT eval.      Dispo/Discharge Planning:   Pending clinical course. Diet: Diet NPO Exceptions are: Ice Chips, Sips of Water with Meds  VTE ppx: Heparin  Code status: Prior    Hospital Problems:  Principal Problem:    Acute urinary retention  Active Problems:    Mild episode of recurrent major depressive disorder (HCC)    Attention to ileostomy ([facility] [address]    Ileus (HCC)    Hydronephrosis of right kidney    Urinary tract infection associated with indwelling urethral catheter ([address]    Acute UTI (urinary tract infection)  Resolved Problems:    * No resolved hospital problems.  *       Past History:     Past Medical History:   Diagnosis Date    Acute deep vein thrombosis (DVT) of non-extremity vein 5/20/2019    Family history of malignant neoplasm of gastrointestinal tract     mother colon/ovarian cancer 67    Fecal incontinence     LAR syndrome    John catheter in place 2022    patient stated- \"catheter due to them nicking his bladder and it won't heal\"    Former cigarette smoker     History of rectal cancer     Hypothyroid     stable w/med    Infection and inflammatory reaction due to other internal prosthetic devices, implants and grafts, subsequent encounter 2017    abd wound/mesh colostomy  Insomnia     takes meds    Major depression     Osteoarthritis, hand     Personal history of colonic polyps 9/2013    x 1    Personal history of malignant neoplasm of rectum, rectosigmoid junction, 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    surgery and radiation    Psychiatric disorder     anxiety     Past Surgical History:   Procedure Laterality Date    COLONOSCOPY  last 2/3/15    Roberto--no polyps--3 year recall    COLONOSCOPY N/A 3/5/2021    COLONOSCOPY/BMI 19 performed by Jonathon Coon MD at [facility] ENDOSCOPY    COLONOSCOPY N/A 2/12/2018    COLONOSCOPY / BMI=21 performed by Virginia Dominguez MD at [address] [place] [address]  2/12/2018         COLONOSCOPY THRU STOMA,LESN RMVL W/SNARE  3/5/2021         COLOSTOMY  5/11/16    CT RETROPERITONEAL PERC DRAIN  5/13/2021    CT RETROPERITONEAL PERC DRAIN 5/13/2021 [facility] CT SC[facility]    CT RETROPERITONEAL PERC DRAIN  5/20/2021    CT RETROPERITONEAL PERC DRAIN 5/20/2021 [facility] CT SCAN    GI  4/2016    Sacral nerve stimlator lead trial (unsucessful) and removal    HERNIA REPAIR      and Colostomy in May    HERNIA REPAIR  3/2015, 5/2016    IR ABSCESS EVAL THRU EXISTING CATH  11/24/2021    IR ABSCESS EVAL THRU EXISTING CATH  6/4/2021    IR ABSCESS EVAL THRU EXISTING CATH  5/20/2021    IR NEPHROSTOGRAM EXISTING ACCESS  12/10/2021    IR NEPHROSTOGRAM EXISTING ACCESS  10/4/2021    IR NEPHROSTOMY EXCHANGE CATHETER  12/7/2021    IR NEPHROSTOMY EXCHANGE CATHETER  11/3/2021    IR NEPHROSTOMY EXCHANGE CATHETER  10/28/2021    IR NEPHROSTOMY PERCUTANEOUS LEFT  9/16/2021    IR NEPHROSTOMY PERCUTANEOUS LEFT  9/16/2021    IR NEPHROSTOMY PERCUTANEOUS LEFT 9/16/2021 [facility]    IR REPLCE T CVC WO PORT SAME ACC  5/30/2019    IR TUBE CHANGE  8/5/2021    IR TUBE CHANGE  7/8/2021    IR TUBE CHANGE  6/14/2021    IR TUBE CHANGE  5/27/2021    IR TUNNELED CATHETER PLACEMENT GREATER THAN 5 YEARS  5/20/2019    IR TUNNELED CATHETER PLACEMENT GREATER THAN 5 YEARS  2019    IR TUNNELED CATHETER PLACEMENT GREATER THAN 5 YEARS 2019 [facility] SPE[facility]    OTHER SURGICAL HISTORY Bilateral     cataracts      OTHER SURGICAL HISTORY  10/7/2013    Roberto---endorectal us    POLYPECTOMY      TOTAL COLECTOMY  2013    Roberto--lap LAR with coloproctostomy, mobilization splenic flexure, diverting loop ileostomy    TOTAL COLECTOMY Right 2014    for leak    VASCULAR SURGERY Left     single lumen port/subsequently removed      Social History     Tobacco Use    Smoking status: Former Smoker     Packs/day: 1.00     Quit date: 1963     Years since quittin.6    Smokeless tobacco: Never Used   Substance Use Topics    Alcohol use: Not Currently     Alcohol/week: 11.7 standard drinks      Social History     Substance and Sexual Activity   Drug Use No     Family History   Problem Relation Age of Onset    Cancer Mother         colon and ovarian    Cancer Brother         prostate    Alcohol Abuse Brother     Cancer Maternal Grandmother         bone    Alcohol Abuse Father     Stroke Paternal Grandfather     Alcohol Abuse Sister       Family history reviewed and negative except as noted above.       Immunization History   Administered Date(s) Administered    COVID-19, PFIZER PURPLE top, DILUTE for use, (age 15 y+), 30mcg/0.3mL 02/15/2021, 2021, 10/25/2021    PPD Test 2014, 2014, 2014, 2015, 2016, 2019, 2019, 2021, 2021, 2022, 2022     No Known Allergies  Prior to Admit Medications:  Current Outpatient Medications   Medication Instructions    acetaminophen (TYLENOL) 500 mg, Oral, EVERY 6 HOURS PRN    amitriptyline (ELAVIL) 100 mg, Oral    amoxicillin-clavulanate (AUGMENTIN) 875-125 MG per tablet 1 tablet, Oral, EVERY 12 HOURS SCHEDULED    carvedilol (COREG) 6.25 mg, Oral, 2 TIMES DAILY WITH MEALS    clonazePAM (KLONOPIN) 4 mg, Oral, NIGHTLY PRN    famotidine hour(s))   BMP    Collection Time: 07/07/22  3:56 AM   Result Value Ref Range    Sodium 134 (L) 136 - 145 mmol/L    Potassium 4.2 3.5 - 5.1 mmol/L    Chloride 99 98 - 107 mmol/L    CO2 25 21 - 32 mmol/L    Anion Gap 10 7 - 16 mmol/L    Glucose 91 65 - 100 mg/dL    BUN 33 (H) 8 - 23 MG/DL    CREATININE 1.80 (H) 0.8 - 1.5 MG/DL    GFR  47 (L) >60 ml/min/1.73m2    GFR Non- 39 (L) >60 ml/min/1.73m2    Calcium 9.1 8.3 - 10.4 MG/DL   CBC with Auto Differential    Collection Time: 07/07/22  3:56 AM   Result Value Ref Range    WBC 6.2 4.3 - 11.1 K/uL    RBC 4.15 (L) 4.23 - 5.6 M/uL    Hemoglobin 11.9 (L) 13.6 - 17.2 g/dL    Hematocrit 38.3 (L) 41.1 - 50.3 %    MCV 92.3 79.6 - 97.8 FL    MCH 28.7 26.1 - 32.9 PG    MCHC 31.1 (L) 31.4 - 35.0 g/dL    RDW 13.2 11.9 - 14.6 %    Platelets 966 858 - 589 K/uL    MPV 9.4 9.4 - 12.3 FL    nRBC 0.00 0.0 - 0.2 K/uL    Differential Type AUTOMATED      Seg Neutrophils 72 43 - 78 %    Lymphocytes 14 13 - 44 %    Monocytes 11 4.0 - 12.0 %    Eosinophils % 2 0.5 - 7.8 %    Basophils 1 0.0 - 2.0 %    Immature Granulocytes 0 0.0 - 5.0 %    Segs Absolute 4.5 1.7 - 8.2 K/UL    Absolute Lymph # 0.9 0.5 - 4.6 K/UL    Absolute Mono # 0.7 0.1 - 1.3 K/UL    Absolute Eos # 0.1 0.0 - 0.8 K/UL    Basophils Absolute 0.1 0.0 - 0.2 K/UL    Absolute Immature Granulocyte 0.0 0.0 - 0.5 K/UL   Urinalysis w rflx microscopic    Collection Time: 07/07/22  6:07 AM   Result Value Ref Range    Color, UA YELLOW/STRAW      Appearance CLOUDY      Specific Sidney, UA 1.016 1.001 - 1.023      pH, Urine 5.0 5.0 - 9.0      Protein,  (A) NEG mg/dL    Glucose, UA Negative mg/dL    Ketones, Urine Negative NEG mg/dL    Bilirubin Urine Negative NEG      Blood, Urine LARGE (A) NEG      Urobilinogen, Urine 0.2 0.2 - 1.0 EU/dL    Nitrite, Urine Negative NEG      Leukocyte Esterase, Urine MODERATE (A) NEG      WBC, UA 20-50 (A) NORM /hpf    RBC, UA >100 (A) NORM /hpf    Epithelial Cells UA 0-5 (A) NORM /hpf    BACTERIA, URINE Negative (A) NORM /hpf    Casts 2-5 (A) NORM /lpf       Other Studies:  CT ABDOMEN PELVIS RENAL STONE Additional Contrast? None    Result Date: 7/7/2022  Clinical history: Flank pain. Urinary retention. TECHNIQUE: Axial, coronal and sagittal CT of the abdomen/pelvis without IV contrast. Radiation dose reduction techniques were used for this study:  Our CT scanners use one or all of the following: Automated exposure control, adjustment of the mA and/or kVp according to patient's size, iterative reconstruction. Comparison: June 23, 2022. FINDINGS: Mild subsegmental atelectasis in the visualized lung bases. There is coronary artery disease. Abdomen findings: Absence of IV contrast limits sensitivity. There is a right-sided nephroureteral stent, extending from the renal pelvis all the way into the urinary bladder. There is moderate right hydronephrosis, new compared to June 23, 2022 study. There is mild left hydronephrosis. No left ureteral calculus is seen. No perinephric fluid collection. No radiopaque gallstone. The spleen and liver are normal in size. There is no peripancreatic fluid collection. Unenhanced adrenal glands are unremarkable. Abdominal aorta is normal in course and caliber with atherosclerotic calcifications. Evaluation for lymphadenopathy is suboptimal due to the absence of IV contrast. The stomach is normal in contour. Pelvic findings: Urinary bladder is overdistended. A John catheter is present. There are findings of prior colectomy and left lower quadrant ostomy. A dilated small bowel loop is noted in the left hemiabdomen. There is soft tissue thickening in the presacral region, similar to prior exam. There is no free air or free fluid. 1. Moderate right hydronephrosis, despite the presence of a right-sided nephroureteral stent. The stent was also present on June 23, 2022 study but the hydrocephalus is new compared to that study. 2. Mild left hydronephrosis.  No left ureteral stone is seen. Finding may be related to overdistention of the urinary bladder. 3.. Overdistended urinary bladder. John catheter balloon is in the prostatic urethra. 4. A dilated loop of small bowel in the left hemiabdomen, likely due to ileus. Echocardiogram:  No results found for this or any previous visit. Meds previously ordered:  Orders Placed This Encounter   Medications    piperacillin-tazobactam (ZOSYN) 4,500 mg in sodium chloride 0.9 % 100 mL IVPB (mini-bag)     Order Specific Question:   Antimicrobial Indications     Answer:   Urinary Tract Infection    0.9 % sodium chloride infusion    0.9 % sodium chloride bolus       Blood Product Consent:  I have discussed with the patient the rationale for blood component transfusion; its benefits in treating or preventing fatigue, organ damage, or death; and its risk which includes mild transfusion reactions, rare risk of blood borne infection, or more serious but rare reactions. I have discussed the alternatives to transfusion, including the risk and consequences of not receiving transfusion. The patient had an opportunity to ask questions and had agreed to proceed with transfusion of blood components. Signed:  Nelida Lizarraga MD    Part of this note may have been written by using a voice dictation software. The note has been proof read but may still contain some grammatical/other typographical errors.

## 2022-07-07 NOTE — ED PROVIDER NOTES
[employer] Emergency Department Provider Note                     PCP:                Jennifer Sierra DO               Age: 64 y.o. Sex: male           ICD-10-CM    1. Urinary tract infection associated with indwelling urethral catheter, initial encounter ([facility]  T83.511A     N39.0    2. Bilateral hydronephrosis  N13.30    3. Ileus ([facility] [address]  K56.7        DISPOSITION Decision To Admit 07/07/2022 07:27:55 AM       New Prescriptions    No medications on file       MDM  Number of Diagnoses or Management Options  Bilateral hydronephrosis: established, worsening  Ileus ([facility] [address]): established, worsening  Urinary tract infection associated with indwelling urethral catheter, initial encounter [facility]): established, worsening  Diagnosis management comments: John was replaced good urine output however has an UTI with urine tested THE new catheter. Patient has has an ileus by CT we will asked hospitalist to admit start antibiotic antibiotics again. Amount and/or Complexity of Data Reviewed  Clinical lab tests: ordered and reviewed  Tests in the radiology section of CPT®: ordered and reviewed  Tests in the medicine section of CPT®: ordered and reviewed  Decide to obtain previous medical records or to obtain history from someone other than the patient: yes  Review and summarize past medical records: yes  Independent visualization of images, tracings, or specimens: yes    Risk of Complications, Morbidity, and/or Mortality  Presenting problems: high  Diagnostic procedures: high  Management options: high        Orders Placed This Encounter   Procedures    Culture, Blood 1    Culture, Blood 1    CT ABDOMEN PELVIS RENAL STONE Additional Contrast? None    BMP    CBC with Auto Differential    Urinalysis w rflx microscopic    Insert/Charge John Catheter - Simple        No follow-up provider specified.      Marsha Friend is a 64 y.o. male who presents to the Emergency Department with chief complaint of    Chief Complaint Patient presents with    Urinary Retention      61-year-old male complaint of urinary retention. This is been going on for approximate 4 hours. He was sent from nursing home in [place]. Patient did have a UTI last month requiring a John be placed and that urinary tract infection caused sepsis. The history is provided by the patient, medical records, the EMS personnel and the nursing home. Dysuria   This is a recurrent problem. The current episode started 3 to 5 hours ago. The problem occurs every urination. The problem has not changed since onset. There has been no fever. Associated symptoms include urgency. Pertinent negatives include no chills, no vomiting and no discharge. He has tried nothing for the symptoms. Review of Systems   Constitutional: Negative. Negative for activity change, appetite change, chills and fever. HENT: Negative. Eyes: Negative. Respiratory: Negative. Cardiovascular: Negative. Gastrointestinal: Negative. Negative for vomiting. Endocrine: Negative. Genitourinary: Positive for dysuria and urgency. Musculoskeletal: Negative. Skin: Negative. Neurological: Negative. Hematological: Negative. Psychiatric/Behavioral: Negative. All other systems reviewed and are negative. All other systems reviewed and are negative.       Past Medical History:   Diagnosis Date    Acute deep vein thrombosis (DVT) of non-extremity vein 5/20/2019    Family history of malignant neoplasm of gastrointestinal tract     mother colon/ovarian cancer 67    Fecal incontinence     LAR syndrome    John catheter in place 2022    patient stated- \"catheter due to them nicking his bladder and it won't heal\"    Former cigarette smoker     History of rectal cancer     Hypothyroid     stable w/med    Infection and inflammatory reaction due to other internal prosthetic devices, implants and grafts, subsequent encounter 2017    abd wound/mesh colostomy    Insomnia takes meds    Major depression     Osteoarthritis, hand     Personal history of colonic polyps 9/2013    x 1    Personal history of malignant neoplasm of rectum, rectosigmoid junction, 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    surgery and radiation    Psychiatric disorder     anxiety        Past Surgical History:   Procedure Laterality Date    COLONOSCOPY  last 2/3/15    Roberto--no polyps--3 year recall    COLONOSCOPY N/A 3/5/2021    COLONOSCOPY/BMI 19 performed by Natalie Slater MD at [facility] ENDOSCOPY    COLONOSCOPY N/A 2/12/2018    COLONOSCOPY / BMI=21 performed by Brittney Mcleod MD at [address] [place] [address]  2/12/2018         COLONOSCOPY THRU STOMA,JEFF RMVL W/SNARE  3/5/2021         COLOSTOMY  5/11/16    CT RETROPERITONEAL PERC DRAIN  5/13/2021    CT RETROPERITONEAL PERC DRAIN 5/13/2021 [facility]    CT RETROPERITONEAL PERC DRAIN  5/20/2021    CT RETROPERITONEAL PERC DRAIN 5/20/2021 [facility] CT SCAN    GI  4/2016    Sacral nerve stimlator lead trial (unsucessful) and removal    HERNIA REPAIR      and Colostomy in May    HERNIA REPAIR  3/2015, 5/2016    IR ABSCESS EVAL THRU EXISTING CATH  11/24/2021    IR ABSCESS EVAL THRU EXISTING CATH  6/4/2021    IR ABSCESS EVAL THRU EXISTING CATH  5/20/2021    IR NEPHROSTOGRAM EXISTING ACCESS  12/10/2021    IR NEPHROSTOGRAM EXISTING ACCESS  10/4/2021    IR NEPHROSTOMY EXCHANGE CATHETER  12/7/2021    IR NEPHROSTOMY EXCHANGE CATHETER  11/3/2021    IR NEPHROSTOMY EXCHANGE CATHETER  10/28/2021    IR NEPHROSTOMY PERCUTANEOUS LEFT  9/16/2021    IR NEPHROSTOMY PERCUTANEOUS LEFT  9/16/2021    IR NEPHROSTOMY PERCUTANEOUS LEFT 9/16/2021 [facility]    IR REPLCE T CVC WO PORT SAME ACC  5/30/2019    IR TUBE CHANGE  8/5/2021    IR TUBE CHANGE  7/8/2021    IR TUBE CHANGE  6/14/2021    IR TUBE CHANGE  5/27/2021    IR TUNNELED CATHETER PLACEMENT GREATER THAN 5 YEARS  5/20/2019    IR TUNNELED CATHETER PLACEMENT GREATER THAN 5 YEARS 5/20/2019    IR TUNNELED CATHETER PLACEMENT GREATER THAN 5 YEARS 5/20/2019 [facility]    OTHER SURGICAL HISTORY Bilateral     cataracts  2017    OTHER SURGICAL HISTORY  10/7/2013    Roberto---endorectal us    POLYPECTOMY      TOTAL COLECTOMY  11/2013    Roberto--lap LAR with coloproctostomy, mobilization splenic flexure, diverting loop ileostomy    TOTAL COLECTOMY Right 8/2014    for leak    VASCULAR SURGERY Left 4/14    single lumen port/subsequently removed        Family History   Problem Relation Age of Onset    Cancer Mother         colon and ovarian    Cancer Brother         prostate    Alcohol Abuse Brother     Cancer Maternal Grandmother         bone    Alcohol Abuse Father     Stroke Paternal Grandfather     Alcohol Abuse Sister            Social Connections:     Frequency of Communication with Friends and Family: Not on file    Frequency of Social Gatherings with Friends and Family: Not on file    Attends [employer] Services: Not on file    Active Member of Clubs or Organizations: Not on file    Attends Club or Organization Meetings: Not on file    Marital Status: Not on file        No Known Allergies     Vitals signs and nursing note reviewed. No data found. Physical Exam  Vitals and nursing note reviewed. Constitutional:       Appearance: Normal appearance. He is obese. HENT:      Head: Normocephalic. Right Ear: External ear normal.      Left Ear: External ear normal.      Nose: Nose normal. No congestion. Mouth/Throat:      Mouth: Mucous membranes are moist.      Pharynx: Oropharynx is clear. Eyes:      Extraocular Movements: Extraocular movements intact. Conjunctiva/sclera: Conjunctivae normal.      Pupils: Pupils are equal, round, and reactive to light. Cardiovascular:      Rate and Rhythm: Normal rate and regular rhythm. Pulses: Normal pulses. Heart sounds: Normal heart sounds. No murmur heard.       Pulmonary:      Effort: Pulmonary effort is normal. No respiratory distress. Breath sounds: Normal breath sounds. Abdominal:      General: There is distension. Palpations: Abdomen is soft. Tenderness: There is abdominal tenderness in the suprapubic area. Musculoskeletal:         General: Normal range of motion. Cervical back: Normal range of motion and neck supple. No rigidity. Skin:     General: Skin is warm and dry. Capillary Refill: Capillary refill takes less than 2 seconds. Neurological:      General: No focal deficit present. Mental Status: He is alert. Cranial Nerves: No cranial nerve deficit. Motor: No weakness. Psychiatric:         Mood and Affect: Mood normal.         Behavior: Behavior normal.         Thought Content: Thought content normal.         Judgment: Judgment normal.          Procedures    Labs Reviewed   BASIC METABOLIC PANEL - Abnormal; Notable for the following components:       Result Value    Sodium 134 (*)     BUN 33 (*)     CREATININE 1.80 (*)     GFR  47 (*)     GFR Non- 39 (*)     All other components within normal limits   CBC WITH AUTO DIFFERENTIAL - Abnormal; Notable for the following components:    RBC 4.15 (*)     Hemoglobin 11.9 (*)     Hematocrit 38.3 (*)     MCHC 31.1 (*)     All other components within normal limits   URINALYSIS - Abnormal; Notable for the following components:    Protein,  (*)     Blood, Urine LARGE (*)     Leukocyte Esterase, Urine MODERATE (*)     WBC, UA 20-50 (*)     RBC, UA >100 (*)     Epithelial Cells UA 0-5 (*)     BACTERIA, URINE Negative (*)     Casts 2-5 (*)     All other components within normal limits   CULTURE, BLOOD 1   CULTURE, BLOOD 1        CT ABDOMEN PELVIS RENAL STONE Additional Contrast? None   Final Result      1. Moderate right hydronephrosis, despite the presence of a right-sided   nephroureteral stent.  The stent was also present on June 23, 2022 study but the hydrocephalus is new compared to that study. 2. Mild left hydronephrosis. No left ureteral stone is seen. Finding may be   related to overdistention of the urinary bladder. 3.. Overdistended urinary bladder. John catheter balloon is in the prostatic   urethra. 4. A dilated loop of small bowel in the left hemiabdomen, likely due to ileus. H[place]ck[place] Coma Scale  Eye Opening: Spontaneous  Best Verbal Response: Oriented  Best Motor Response: Obeys commands  Nydia Coma Scale Score: 15                     CIWA Assessment  BP: (!) 145/84  Heart Rate: 86                       Voice dictation software was used during the making of this note. This software is not perfect and grammatical and other typographical errors may be present. This note has not been completely proofread for errors.        Terri Marrufo MD  07/07/22 [id number]

## 2022-07-07 NOTE — PROGRESS NOTES
OCCUPATIONAL THERAPY Initial Assessment and Daily Note       OT Visit Days: 1  Acknowledge Orders  Time  OT Charge Capture  Rehab Caseload Tracker      Belle Gutierrez is a 64 y.o. male   PRIMARY DIAGNOSIS: Acute urinary retention  Ileus ([facility] [K56.7]  Bilateral hydronephrosis [N13.30]  Acute urinary retention [R33.8]  Urinary tract infection associated with indwelling urethral catheter, initial encounter ([facility] [address] [T83.511A, N39.0]       Reason for Referral: Generalized Muscle Weakness (M62.81)  History of falling (Z91.81)  Inpatient: Payor: MEDICARE / Plan: MEDICARE PART A AND B / Product Type: *No Product type* /     ASSESSMENT:     REHAB RECOMMENDATIONS:   Recommendation to date pending progress:  Setting:   Short-term Rehab    Equipment:     None     ASSESSMENT:  Mr. Meghan Lang was admitted from [address] [place] [address] w/ urinary retention. Pt presented generally weak with deficits in strength, endurance, mobility, and balance impacting ADLs. Pt required CGA for ADLs and mobility for ADLs. Pt requested to not use RW d/t having just graduated from it yesterday and had one LOB requiring min A to correct. Pt is below his functional baseline and would benefit from skilled OT services to address deficits. [employer] [facility] 6 Clicks Daily Activity Inpatient Short Form:    AM-PAC Daily Activity Inpatient   How much help for putting on and taking off regular lower body clothing?: A Little  How much help for Bathing?: A Little  How much help for Toileting?: A Little  How much help for putting on and taking off regular upper body clothing?: None  How much help for taking care of personal grooming?: None  How much help for eating meals?: None  [facility] Inpatient Daily Activity Raw Score: 21  AM-PAC Inpatient ADL T-Scale Score : 44.27  ADL Inpatient CMS 0-100% Score: 32.79  ADL Inpatient CMS G-Code Modifier : CJ           SUBJECTIVE:     Mr. Meghan Lang states, \"It's amazing how fast you get weak. \"     Social/Functional     From STR at [facility]. Lives alone and is independent at [address], no AD. Reports has regained independence w/ ADLs and started walking w/o an AD at [facility].      OBJECTIVE:     Jasbir Roberson / Estefania Hallmark / AIRWAY: IV    RESTRICTIONS/PRECAUTIONS:       PAIN: VITALS / O2:   Pre Treatment: 0         Post Treatment: 0       Vitals          Oxygen            GROSS EVALUATION: INTACT IMPAIRED   (See Comments)   UE AROM [x] []   UE PROM [] []   Strength []  grossly decreased     Posture / Balance []  impaired dynamic balance   Sensation []     Coordination [x]       Tone []       Edema []    Activity Tolerance [] Patient limited by fatigue     Hand Dominance R [] L []      COGNITION/  PERCEPTION: INTACT IMPAIRED   (See Comments)   Orientation [x]     Vision [x]     Hearing []     Cognition  []  impulsive at times   Perception []       MOBILITY: I Mod I S SBA CGA Min Mod Max Total  NT x2 Comments:   Bed Mobility    Rolling [] [] [] [] [] [] [] [] [] [] []    Supine to Sit [] [] [] [x] [] [] [] [] [] [] []    Scooting [] [] [] [x] [] [] [] [] [] [] []    Sit to Supine [] [] [] [] [] [] [] [] [] [] []    Transfers    Sit to Stand [] [] [] [] [x] [] [] [] [] [] []    Bed to Chair [] [] [] [] [x] [] [] [] [] [] []    Stand to Sit [] [] [] [] [x] [] [] [] [] [] []    Tub/Shower [] [] [] [] [] [] [] [] [] [] []     Toilet [] [] [] [] [] [] [] [] [] [] []      [] [] [] [] [] [] [] [] [] [] []    I=Independent, Mod I=Modified Independent, S=Supervision/Setup, SBA=Standby Assistance, CGA=Contact Guard Assistance, Min=Minimal Assistance, Mod=Moderate Assistance, Max=Maximal Assistance, Total=Total Assistance, NT=Not Tested    ACTIVITIES OF DAILY LIVING: I Mod I S SBA CGA Min Mod Max Total NT Comments   BASIC ADLs:              Upper Body Bathing  [] [] [] [] [] [] [] [] [] []    Lower Body Bathing [] [] [] [] [] [] [] [] [] []    Toileting [] [] [] [] [] [] [] [] [] []    Upper Body Dressing [] [] [] [] [] [] [] [] [] []    Lower Body Dressing [] [] [] [] [x] [] TREATMENT GRID:  N/A    AFTER TREATMENT PRECAUTIONS: Chair, Needs within reach and RN notified    INTERDISCIPLINARY COLLABORATION:  RN/ PCT    EDUCATION:  Education Given To: Patient  Education Provided: Role of Therapy;Plan of Care; Fall Prevention Strategies    TOTAL TREATMENT DURATION AND TIME:  Time In: 1125  Time Out: [facility]  Minutes: Femi Monsivais, OT

## 2022-07-08 ENCOUNTER — TELEPHONE (OUTPATIENT)
Dept: INTERNAL MEDICINE CLINIC | Facility: CLINIC | Age: 77
End: 2022-07-08

## 2022-07-08 ENCOUNTER — APPOINTMENT (OUTPATIENT)
Dept: GENERAL RADIOLOGY | Age: 77
DRG: 699 | End: 2022-07-08
Payer: MEDICARE

## 2022-07-08 VITALS
WEIGHT: 125 LBS | SYSTOLIC BLOOD PRESSURE: 124 MMHG | HEIGHT: 69 IN | RESPIRATION RATE: 18 BRPM | TEMPERATURE: 98.8 F | DIASTOLIC BLOOD PRESSURE: 71 MMHG | HEART RATE: 63 BPM | BODY MASS INDEX: 18.51 KG/M2 | OXYGEN SATURATION: 98 %

## 2022-07-08 LAB
ALBUMIN SERPL-MCNC: 2.8 G/DL (ref 3.2–4.6)
ALBUMIN/GLOB SERPL: 0.8 {RATIO} (ref 1.2–3.5)
ALP SERPL-CCNC: 85 U/L (ref 50–136)
ALT SERPL-CCNC: 32 U/L (ref 12–65)
ANION GAP SERPL CALC-SCNC: 5 MMOL/L (ref 7–16)
AST SERPL-CCNC: 22 U/L (ref 15–37)
BASOPHILS # BLD: 0.1 K/UL (ref 0–0.2)
BASOPHILS NFR BLD: 2 % (ref 0–2)
BILIRUB SERPL-MCNC: 0.3 MG/DL (ref 0.2–1.1)
BUN SERPL-MCNC: 19 MG/DL (ref 8–23)
CALCIUM SERPL-MCNC: 8.6 MG/DL (ref 8.3–10.4)
CHLORIDE SERPL-SCNC: 109 MMOL/L (ref 98–107)
CO2 SERPL-SCNC: 26 MMOL/L (ref 21–32)
CREAT SERPL-MCNC: 1.2 MG/DL (ref 0.8–1.5)
DIFFERENTIAL METHOD BLD: ABNORMAL
EOSINOPHIL # BLD: 0.1 K/UL (ref 0–0.8)
EOSINOPHIL NFR BLD: 5 % (ref 0.5–7.8)
ERYTHROCYTE [DISTWIDTH] IN BLOOD BY AUTOMATED COUNT: 13.2 % (ref 11.9–14.6)
GLOBULIN SER CALC-MCNC: 3.4 G/DL (ref 2.3–3.5)
GLUCOSE SERPL-MCNC: 111 MG/DL (ref 65–100)
HCT VFR BLD AUTO: 34.7 % (ref 41.1–50.3)
HGB BLD-MCNC: 10.9 G/DL (ref 13.6–17.2)
IMM GRANULOCYTES # BLD AUTO: 0 K/UL (ref 0–0.5)
IMM GRANULOCYTES NFR BLD AUTO: 0 % (ref 0–5)
LYMPHOCYTES # BLD: 0.5 K/UL (ref 0.5–4.6)
LYMPHOCYTES NFR BLD: 16 % (ref 13–44)
MCH RBC QN AUTO: 28.8 PG (ref 26.1–32.9)
MCHC RBC AUTO-ENTMCNC: 31.4 G/DL (ref 31.4–35)
MCV RBC AUTO: 91.6 FL (ref 79.6–97.8)
MONOCYTES # BLD: 0.4 K/UL (ref 0.1–1.3)
MONOCYTES NFR BLD: 13 % (ref 4–12)
NEUTS SEG # BLD: 1.8 K/UL (ref 1.7–8.2)
NEUTS SEG NFR BLD: 64 % (ref 43–78)
NRBC # BLD: 0 K/UL (ref 0–0.2)
PLATELET # BLD AUTO: 254 K/UL (ref 150–450)
PMV BLD AUTO: 9.1 FL (ref 9.4–12.3)
POTASSIUM SERPL-SCNC: 3.9 MMOL/L (ref 3.5–5.1)
PROT SERPL-MCNC: 6.2 G/DL (ref 6.3–8.2)
RBC # BLD AUTO: 3.79 M/UL (ref 4.23–5.6)
SODIUM SERPL-SCNC: 140 MMOL/L (ref 138–145)
WBC # BLD AUTO: 2.8 K/UL (ref 4.3–11.1)

## 2022-07-08 PROCEDURE — 87088 URINE BACTERIA CULTURE: CPT

## 2022-07-08 PROCEDURE — 74018 RADEX ABDOMEN 1 VIEW: CPT

## 2022-07-08 PROCEDURE — 2580000003 HC RX 258: Performed by: HOSPITALIST

## 2022-07-08 PROCEDURE — 80053 COMPREHEN METABOLIC PANEL: CPT

## 2022-07-08 PROCEDURE — 85025 COMPLETE CBC W/AUTO DIFF WBC: CPT

## 2022-07-08 PROCEDURE — 87186 SC STD MICRODIL/AGAR DIL: CPT

## 2022-07-08 PROCEDURE — 6370000000 HC RX 637 (ALT 250 FOR IP): Performed by: HOSPITALIST

## 2022-07-08 PROCEDURE — 87086 URINE CULTURE/COLONY COUNT: CPT

## 2022-07-08 PROCEDURE — 6360000002 HC RX W HCPCS: Performed by: HOSPITALIST

## 2022-07-08 PROCEDURE — 36415 COLL VENOUS BLD VENIPUNCTURE: CPT

## 2022-07-08 RX ORDER — SENNA AND DOCUSATE SODIUM 50; 8.6 MG/1; MG/1
1 TABLET, FILM COATED ORAL 2 TIMES DAILY
Qty: 28 TABLET | Refills: 0
Start: 2022-07-08 | End: 2022-07-22

## 2022-07-08 RX ORDER — POLYETHYLENE GLYCOL 3350 17 G/17G
17 POWDER, FOR SOLUTION ORAL DAILY PRN
Qty: 527 G | Refills: 1
Start: 2022-07-08 | End: 2022-07-15

## 2022-07-08 RX ORDER — AMOXICILLIN AND CLAVULANATE POTASSIUM 875; 125 MG/1; MG/1
1 TABLET, FILM COATED ORAL EVERY 12 HOURS SCHEDULED
Qty: 14 TABLET | Refills: 0
Start: 2022-07-08 | End: 2022-07-15

## 2022-07-08 RX ORDER — SACCHAROMYCES BOULARDII 250 MG
250 CAPSULE ORAL 2 TIMES DAILY
Qty: 20 CAPSULE | Refills: 0
Start: 2022-07-08 | End: 2022-07-18

## 2022-07-08 RX ADMIN — CEFEPIME HYDROCHLORIDE 1000 MG: 1 INJECTION, POWDER, FOR SOLUTION INTRAMUSCULAR; INTRAVENOUS at 09:56

## 2022-07-08 RX ADMIN — SODIUM CHLORIDE, PRESERVATIVE FREE 10 ML: 5 INJECTION INTRAVENOUS at 09:57

## 2022-07-08 RX ADMIN — LEVOTHYROXINE SODIUM 125 MCG: 0.05 TABLET ORAL at 06:18

## 2022-07-08 RX ADMIN — PANTOPRAZOLE SODIUM 40 MG: 40 TABLET, DELAYED RELEASE ORAL at 06:19

## 2022-07-08 RX ADMIN — DEXTROSE AND SODIUM CHLORIDE: 5; 900 INJECTION, SOLUTION INTRAVENOUS at 06:21

## 2022-07-08 RX ADMIN — CARVEDILOL 6.25 MG: 6.25 TABLET, FILM COATED ORAL at 09:57

## 2022-07-08 RX ADMIN — HEPARIN SODIUM 5000 UNITS: 5000 INJECTION INTRAVENOUS; SUBCUTANEOUS at 09:00

## 2022-07-08 NOTE — TELEPHONE ENCOUNTER
----- Message from [place] Baldwin sent at 7/5/2022  2:57 PM EDT -----  Subject: Referral Request    Reason for referral request? Patient states he is neurologist due to   difficulty walking. Patient is in rehab until 7/13. Patient only   communication is by e-mail [email or web address]  Provider patient wants to be referred to(if known):     Provider Phone Number(if known):     Additional Information for Provider?   ---------------------------------------------------------------------------  --------------  [id number]    [phone]; Do not leave any message, patient will call back for answer  ---------------------------------------------------------------------------  --------------

## 2022-07-08 NOTE — PROGRESS NOTES
Report called  To RN at [place] [address] at 1218. Discharge instructions reviewed with pt, pt verbalized understanding of all instructions and home medications . Pt sent home with all personal belongings. Pt discharging with colostomy and 16 fr carr in place, chronic carr recently placed at a prior admission  for retention, carr to remain in place on discharge. pts PIVs removed prior to transport. Pt to be transported back to [facility] via [facility]. No issues or concerns verbalized at this time.

## 2022-07-08 NOTE — DISCHARGE SUMMARY
[unfilled]    Hospitalist Discharge Summary     Patient ID:  Kaylee Hardin  [id number]  3 y.o.  1945  Admit date: 7/7/2022  3:02 AM  Discharge date and time: 7/8/2022  Attending: Birdena Merlin, MD  PCP:  David Danielson DO  Treatment Team: Attending Provider: Birdena Merlin, MD; Registered Nurse: Franchseca Bonilla, RN; Consulting Physician: Jackson Thomas MD; Registered Nurse: Nj Lee. Mary Guy, MIGUEL; Physical Therapist: Tierra Sevilla, PT; Occupational Therapist: Za Sanchez OT; Utilization Reviewer: Domo Friend RN    Principal Diagnosis Acute urinary retention   Principal Problem:    Acute urinary retention  Active Problems:    Mild episode of recurrent major depressive disorder ([address]    Attention to ileostomy ([address]    Ileus ([address]    Hydronephrosis of right kidney    Urinary tract infection associated with indwelling urethral catheter ([address]    Acute kidney injury superimposed on CKD ([address]    Acute UTI (urinary tract infection)  Resolved Problems:    * No resolved hospital problems. *           Hospital Course:  Kaylee Hardin is a 64 y.o. male with medical history of rectal cancer status post sigmoidectomy with colostomy, MDD, urinary retention with chronic John, recent sepsis secondary to polymicrobial bacteremia in the setting of catheter induced UTI admitted on 7/7/22 with urinary retention and recurrent CAUTI. ER work-up remarkable for Pro-Garfield of 0.13, lactic acid 1.3, creatinine 1.8 (baseline 1-1.2). CTAP showed moderate right hydronephrosis despite presence of right-sided nephroureteral stent, mild left hydronephrosis, over distended urinary bladder with John catheter balloon in the prostatic urethra, dilated loop of small bowel and left hemiabdomen likely due to ileus. UA with 20-50 WBC, greater than 100 RBC, moderate leukocyte esterase. Patient received 24 hours of IV cefepime. Urology has evaluated the patient.   As per their recommendation, patient is advised to follow-up with Dr. Poonam Boyd in 1 to 2 weeks after discharge. Patient had John catheter exchanged on 7/7/2022. Blood and urine culture have been negative so far. Urinary retention was thought to be secondary to misplaced John with the balloon residing in the prostatic fossa. Urine has cleared up but patient will complete another course of antibiotic to prevent any further risk of clinical worsening. Will be discharged on probiotic to prevent antibiotic induced diarrhea. KUB from 7/8 showed right-sided ureteral stent with resolved ileus. Patient is currently medically clear and hemodynamically stable for discharge. Rest of the hospital course was uneventful, for further details please refer to daily progress notes. Significant Diagnostic Studies:   KUB       HISTORY: Reevaluate ileus       FINDINGS: A right-sided ureteral stent is present along with a bladder catheter. An ostomy is present in the left lower quadrant. Gas is scattered throughout   nondilated small bowel loops. Left hemidiaphragm is elevated.           Impression   Right-sided ureteral stent in bladder catheter. No significant   distention of air-filled bowel loops. Clinical history: Flank pain. Urinary retention.       TECHNIQUE: Axial, coronal and sagittal CT of the abdomen/pelvis without IV   contrast. Radiation dose reduction techniques were used for this study:  Our CT   scanners use one or all of the following: Automated exposure control, adjustment   of the mA and/or kVp according to patient's size, iterative reconstruction.       Comparison: June 23, 2022.       FINDINGS:       Mild subsegmental atelectasis in the visualized lung bases. There is coronary   artery disease.           Abdomen findings:       Absence of IV contrast limits sensitivity.        There is a right-sided nephroureteral stent, extending from the renal pelvis all   the way into the urinary bladder.  There is moderate right hydronephrosis, new   compared to June 23, 2022 study.       There is mild left hydronephrosis. No left ureteral calculus is seen. No   perinephric fluid collection.       No radiopaque gallstone. The spleen and liver are normal in size. There is no   peripancreatic fluid collection. Unenhanced adrenal glands are unremarkable.       Abdominal aorta is normal in course and caliber with atherosclerotic   calcifications. Evaluation for lymphadenopathy is suboptimal due to the absence   of IV contrast.       The stomach is normal in contour.            Pelvic findings:       Urinary bladder is overdistended. A John catheter is present.       There are findings of prior colectomy and left lower quadrant ostomy. A dilated   small bowel loop is noted in the left hemiabdomen. There is soft tissue   thickening in the presacral region, similar to prior exam.       There is no free air or free fluid.               Impression       1. Moderate right hydronephrosis, despite the presence of a right-sided   nephroureteral stent. The stent was also present on June 23, 2022 study but the   hydrocephalus is new compared to that study.       2. Mild left hydronephrosis. No left ureteral stone is seen. Finding may be   related to overdistention of the urinary bladder.       3.. Overdistended urinary bladder. John catheter balloon is in the prostatic   urethra.       4. A dilated loop of small bowel in the left hemiabdomen, likely due to ileus.             Labs: Results:       Chemistry Recent Labs     07/07/22  0356 07/08/22 0332   * 140   K 4.2 3.9   CL 99 109*   CO2 25 26   BUN 33* 19   GLOB  --  3.4      CBC w/Diff Recent Labs     07/07/22  0356 07/08/22 0332   WBC 6.2 2.8*   RBC 4.15* 3.79*   HGB 11.9* 10.9*   HCT 38.3* 34.7*    254      Cardiac Enzymes No results for input(s): CPK, LENORE in the last 72 hours.     Invalid input(s): CKRMB, CKND1, TROIP   Coagulation No results for input(s): INR, APTT in the last 72 hours.    Invalid input(s): PTP    Lipid Panel Lab Results   Component Value Date/Time    CHOL 205 12/03/2021 08:09 AM    HDL 77 12/03/2021 08:09 AM    VLDL 18 12/03/2021 08:09 AM      BNP Invalid input(s): BNPP   Liver Enzymes No results for input(s): TP, ALB in the last 72 hours. Invalid input(s): TBIL, AP, SGOT, GPT, DBIL   Thyroid Studies Lab Results   Component Value Date/Time    TSH 4.440 12/03/2021 08:09 AM            Discharge Exam:  BP (!) 164/79   Pulse 64   Temp 97.7 °F (36.5 °C) (Oral)   Resp 20   Ht 5' 8.5\" (1.74 m)   Wt 125 lb (56.7 kg)   SpO2 98%   BMI 18.73 kg/m²   General appearance: alert, cooperative, no distress, appears stated age  Lungs: clear to auscultation bilaterally  Heart: regular rate and rhythm, S1, S2 normal, no murmur, click, rub or gallop  Abdomen: Bowel sounds present. Soft, nontender, nondistended. Colostomy bag with yellow stool.   Extremities: no cyanosis or edema  Neurologic: Grossly normal  Psych:        AOx3, mood and affect appropriate    Disposition: SNF  Discharge Condition: stable  Patient Instructions:      Medication List      START taking these medications    polyethylene glycol 17 g packet  Commonly known as: GLYCOLAX  Take 17 g by mouth daily as needed for Constipation     saccharomyces boulardii 250 MG capsule  Commonly known as: Florastor  Take 1 capsule by mouth 2 times daily for 10 days     sennosides-docusate sodium 8.6-50 MG tablet  Commonly known as: SENOKOT-S  Take 1 tablet by mouth in the morning and at bedtime for 14 days        CONTINUE taking these medications    acetaminophen 500 MG tablet  Commonly known as: TYLENOL     amitriptyline 100 MG tablet  Commonly known as: ELAVIL     amoxicillin-clavulanate 875-125 MG per tablet  Commonly known as: AUGMENTIN  Take 1 tablet by mouth every 12 hours for 7 days     carvedilol 3.125 MG tablet  Commonly known as: COREG     clonazePAM 2 MG tablet  Commonly known as: KLONOPIN  Take 2 tablets by mouth nightly as needed for Anxiety for up to 3 days. famotidine 10 MG tablet  Commonly known as: PEPCID  Take 1 tablet by mouth daily     hyoscyamine 125 MCG sublingual tablet  Commonly known as: LEVSIN/SL     ibuprofen 600 MG tablet  Commonly known as: ADVIL;MOTRIN     levothyroxine 125 MCG tablet  Commonly known as: SYNTHROID     OLANZapine 2.5 MG tablet  Commonly known as: ZYPREXA     oxybutynin 10 MG extended release tablet  Commonly known as: DITROPAN-XL        STOP taking these medications    sulfamethoxazole-trimethoprim 800-160 MG per tablet  Commonly known as: BACTRIM DS;SEPTRA DS           Where to Get Your Medications      Information about where to get these medications is not yet available    Ask your nurse or doctor about these medications  · amoxicillin-clavulanate 875-125 MG per tablet  · polyethylene glycol 17 g packet  · saccharomyces boulardii 250 MG capsule  · sennosides-docusate sodium 8.6-50 MG tablet         Activity: activity as tolerated  Diet: cardiac diet  Wound Care: none needed    Follow-up  · Follow-up with Dr. Sujatha Tejeda in 1 to 2 weeks. · Follow-up with PCP next 2 weeks.   Time spent to discharge patient 35 minutes  Signed:  Patricia Wilson MD  7/8/2022  11:08 AM

## 2022-07-08 NOTE — PLAN OF CARE
Problem: Discharge Planning  Goal: Discharge to home or other facility with appropriate resources  Outcome: Progressing  Flowsheets (Taken 7/7/2022 1955)  Discharge to home or other facility with appropriate resources: Identify barriers to discharge with patient and caregiver

## 2022-07-11 ENCOUNTER — CARE COORDINATION (OUTPATIENT)
Dept: CARE COORDINATION | Facility: CLINIC | Age: 77
End: 2022-07-11

## 2022-07-11 LAB
BACTERIA SPEC CULT: ABNORMAL
SERVICE CMNT-IMP: ABNORMAL

## 2022-07-12 ENCOUNTER — HOME HEALTH ADMISSION (OUTPATIENT)
Dept: HOME HEALTH SERVICES | Facility: HOME HEALTH | Age: 77
End: 2022-07-12
Payer: MEDICARE

## 2022-07-13 ENCOUNTER — CARE COORDINATION (OUTPATIENT)
Dept: CARE COORDINATION | Facility: CLINIC | Age: 77
End: 2022-07-13

## 2022-07-13 NOTE — CARE COORDINATION
[facility] Discharge Call [facility]    2022    Patient: Yoan Nolasco   Patient : 1945   MRN: [id number]    Reason for Admission:rectal cancer s/p sigmoidectomy with colostomy, MDD, urinary retention with chronic carr who presented due to decreased performance urinary catheter.  Noticed having dark cloudy puslike urine   Discharge Date: 22      Home alone  22 W/ [address] Transitions Post Acute Facility Transition          Care Transitions Interventions         Future Appointments   Date Time Provider Dot Gusman   2022  9:30 AM [id number] INJECTION/CATH [id number] [facility] AMB   8/10/2022  8:15 AM Brandi Kearney MD [id number] [facility] AMB   2022  8:15 AM TRIM LAB RESOURCE TRIM [facility] AMB   2022  8:30 AM Kwame Dean DO TRIM [facility] AMB       Nithin Sahu RN

## 2022-07-14 ENCOUNTER — CARE COORDINATION (OUTPATIENT)
Dept: CARE COORDINATION | Facility: CLINIC | Age: 77
End: 2022-07-14

## 2022-07-14 NOTE — CARE COORDINATION
ABDIEL outreach made to Pt. S/P discharge from [facility]. LVM, provided my contact information.

## 2022-07-15 ENCOUNTER — CARE COORDINATION (OUTPATIENT)
Dept: CARE COORDINATION | Facility: CLINIC | Age: 77
End: 2022-07-15

## 2022-07-15 NOTE — CARE COORDINATION
ABDIEL Outreach call s/p [address] discharge. LVM, provided my contact information. Will resolve episode until I hear back with any needs.

## 2022-07-16 ENCOUNTER — HOME CARE VISIT (OUTPATIENT)
Dept: SCHEDULING | Facility: HOME HEALTH | Age: 77
End: 2022-07-16
Payer: MEDICARE

## 2022-07-16 VITALS
SYSTOLIC BLOOD PRESSURE: 108 MMHG | HEART RATE: 65 BPM | TEMPERATURE: 97.4 F | DIASTOLIC BLOOD PRESSURE: 62 MMHG | RESPIRATION RATE: 18 BRPM | OXYGEN SATURATION: 98 %

## 2022-07-16 PROCEDURE — 1090000001 HH PPS REVENUE CREDIT

## 2022-07-16 PROCEDURE — 400013 HH SOC

## 2022-07-16 PROCEDURE — G0299 HHS/HOSPICE OF RN EA 15 MIN: HCPCS

## 2022-07-16 PROCEDURE — 0221000100 HH NO PAY CLAIM PROCEDURE

## 2022-07-16 PROCEDURE — 1090000002 HH PPS REVENUE DEBIT

## 2022-07-16 ASSESSMENT — ENCOUNTER SYMPTOMS
PAIN LOCATION - PAIN QUALITY: ACHING
DYSPNEA ACTIVITY LEVEL: AFTER AMBULATING 10 - 20 FT

## 2022-07-17 PROCEDURE — 1090000002 HH PPS REVENUE DEBIT

## 2022-07-17 PROCEDURE — 1090000001 HH PPS REVENUE CREDIT

## 2022-07-18 ENCOUNTER — NURSE ONLY (OUTPATIENT)
Dept: UROLOGY | Age: 77
End: 2022-07-18
Payer: MEDICARE

## 2022-07-18 DIAGNOSIS — R33.9 URINARY RETENTION: Primary | ICD-10-CM

## 2022-07-18 DIAGNOSIS — Z46.6 URINARY CATHETER CHANGE REQUIRED: ICD-10-CM

## 2022-07-18 PROCEDURE — 1090000002 HH PPS REVENUE DEBIT

## 2022-07-18 PROCEDURE — 1090000001 HH PPS REVENUE CREDIT

## 2022-07-18 PROCEDURE — 51702 INSERT TEMP BLADDER CATH: CPT | Performed by: UROLOGY

## 2022-07-18 NOTE — PROGRESS NOTES
Pt came in for q 4 weeks catheter change. 20f coude carr catheter changed without incident. Patient tolerated procedure well.

## 2022-07-19 ENCOUNTER — HOME CARE VISIT (OUTPATIENT)
Dept: SCHEDULING | Facility: HOME HEALTH | Age: 77
End: 2022-07-19
Payer: MEDICARE

## 2022-07-19 PROCEDURE — G0299 HHS/HOSPICE OF RN EA 15 MIN: HCPCS

## 2022-07-19 PROCEDURE — 1090000001 HH PPS REVENUE CREDIT

## 2022-07-19 PROCEDURE — 1090000002 HH PPS REVENUE DEBIT

## 2022-07-20 ENCOUNTER — HOME CARE VISIT (OUTPATIENT)
Dept: SCHEDULING | Facility: HOME HEALTH | Age: 77
End: 2022-07-20
Payer: MEDICARE

## 2022-07-20 VITALS
DIASTOLIC BLOOD PRESSURE: 68 MMHG | RESPIRATION RATE: 16 BRPM | TEMPERATURE: 97.5 F | HEART RATE: 74 BPM | SYSTOLIC BLOOD PRESSURE: 118 MMHG | OXYGEN SATURATION: 99 %

## 2022-07-20 VITALS
RESPIRATION RATE: 17 BRPM | HEART RATE: 65 BPM | DIASTOLIC BLOOD PRESSURE: 73 MMHG | OXYGEN SATURATION: 98 % | SYSTOLIC BLOOD PRESSURE: 125 MMHG | TEMPERATURE: 98.4 F

## 2022-07-20 PROCEDURE — 1090000001 HH PPS REVENUE CREDIT

## 2022-07-20 PROCEDURE — 1090000002 HH PPS REVENUE DEBIT

## 2022-07-20 PROCEDURE — G0151 HHCP-SERV OF PT,EA 15 MIN: HCPCS

## 2022-07-20 ASSESSMENT — ENCOUNTER SYMPTOMS
PAIN LOCATION - PAIN QUALITY: ACHING
HEMOPTYSIS: 0
STOOL DESCRIPTION: SOFT FORMED

## 2022-07-21 ENCOUNTER — HOME CARE VISIT (OUTPATIENT)
Dept: SCHEDULING | Facility: HOME HEALTH | Age: 77
End: 2022-07-21
Payer: MEDICARE

## 2022-07-21 VITALS
SYSTOLIC BLOOD PRESSURE: 100 MMHG | OXYGEN SATURATION: 99 % | HEART RATE: 58 BPM | TEMPERATURE: 97.6 F | DIASTOLIC BLOOD PRESSURE: 90 MMHG | RESPIRATION RATE: 16 BRPM

## 2022-07-21 DIAGNOSIS — R53.1 WEAKNESS: Primary | ICD-10-CM

## 2022-07-21 PROCEDURE — 1090000001 HH PPS REVENUE CREDIT

## 2022-07-21 PROCEDURE — G0299 HHS/HOSPICE OF RN EA 15 MIN: HCPCS

## 2022-07-21 PROCEDURE — 1090000002 HH PPS REVENUE DEBIT

## 2022-07-21 ASSESSMENT — ENCOUNTER SYMPTOMS
PAIN LOCATION - PAIN QUALITY: SORENESS
STOOL DESCRIPTION: SOFT FORMED

## 2022-07-22 PROCEDURE — 1090000002 HH PPS REVENUE DEBIT

## 2022-07-22 PROCEDURE — 1090000001 HH PPS REVENUE CREDIT

## 2022-07-23 PROCEDURE — 1090000002 HH PPS REVENUE DEBIT

## 2022-07-23 PROCEDURE — 1090000001 HH PPS REVENUE CREDIT

## 2022-07-24 PROCEDURE — 1090000001 HH PPS REVENUE CREDIT

## 2022-07-24 PROCEDURE — 1090000002 HH PPS REVENUE DEBIT

## 2022-07-25 PROCEDURE — 1090000002 HH PPS REVENUE DEBIT

## 2022-07-25 PROCEDURE — 1090000001 HH PPS REVENUE CREDIT

## 2022-07-26 ENCOUNTER — HOME CARE VISIT (OUTPATIENT)
Dept: SCHEDULING | Facility: HOME HEALTH | Age: 77
End: 2022-07-26
Payer: MEDICARE

## 2022-07-26 PROCEDURE — 1090000001 HH PPS REVENUE CREDIT

## 2022-07-26 PROCEDURE — G0299 HHS/HOSPICE OF RN EA 15 MIN: HCPCS

## 2022-07-26 PROCEDURE — 1090000002 HH PPS REVENUE DEBIT

## 2022-07-27 PROCEDURE — 1090000002 HH PPS REVENUE DEBIT

## 2022-07-27 PROCEDURE — 1090000001 HH PPS REVENUE CREDIT

## 2022-07-28 VITALS
DIASTOLIC BLOOD PRESSURE: 62 MMHG | OXYGEN SATURATION: 98 % | TEMPERATURE: 97.6 F | SYSTOLIC BLOOD PRESSURE: 112 MMHG | RESPIRATION RATE: 18 BRPM | HEART RATE: 76 BPM

## 2022-07-28 ASSESSMENT — ENCOUNTER SYMPTOMS
PAIN LOCATION - PAIN QUALITY: SORENESS
STOOL DESCRIPTION: SOFT FORMED

## 2022-08-01 NOTE — THERAPY DISCHARGE
Lyudmila Nolasco  : 1945  Primary: Medicare Part A And B  Secondary: [address] [place]  [address] [facility] [address]  Phone: [phone]  Fax: [phone] Plan Frequency: 2 times a week for 12 weeks    Plan of Care/Certification Expiration Date: 22      PT Visit Info:    No data recorded    OUTPATIENT PHYSICAL THERAPY:OP NOTE TYPE: Discharge Summary 2022               Episode  Appt Desk         Treatment Diagnosis:  Generalized Muscle Weakness (M62.81)  Difficulty in walking, Not elsewhere classified (R26.2)  Other abnormalities of gait and mobility (R26.89)  * No diagnoses found *  Medical/Referring Diagnosis:  Balance problems [R26.89]  Colostomy in place [facility]) [Z93.3]  Weakness of both legs [R29.898]  Referring Physician:  Yasmany Holbrook DO MD Orders:  PT Eval and Treat   Return MD Appt:  TBD  Date of Onset:  Onset Date: 21     Allergies:  Patient has no known allergies. Restrictions/Precautions:    Restrictions/Precautions: Fall Risk  Required Braces or Orthoses?: No  No data recorded   Medications Last Reviewed:  2022     SUBJECTIVE   History of Injury/Illness (Reason for Referral):  Pt comes to PT today with a history of lower extremity weakness, difficulty walking - all related to his complex medical history that includes proctectomy, perineal and abdominal approach with right gracilis muscle flap and Repair of cystotomy 21. He noted difficulty with walking with a normal stride, states that he's able to walk at slow speeds but with no power in his stride and stiffness in his hips. He has residual right hip weakness related to the muscle graft used to repair the proctectomy, states that he's not able to straight leg raise his right hip without assistance. His limitations in hip mobility and strength also hinder his ability to cross his right leg over his left knee to put on socks/shoes.  He tries to walk for fitness 3 times a day (~ 15 minutes) each. He denies any history of falling and does not use an assistive device for gait. ADDENDUM: last clinic visit for outpatient rehab was 6/20/2022 - pt became ill and was hospitalized afterwards. Patient Stated Goal(s):  \"Walk better/stronger with normal stride\"  Initial:none      /10 Post Session: none      /10  Past Medical History/Comorbidities:   Mr. 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 Mr. Deloris Singh  has a past surgical history that includes IR GUIDED NEPHROSTOGRAM/URETEROGRAM EXISTING ACCESS (12/10/2021); total colectomy (11/2013); IR GUIDED NEPHROSTOMY CATH EXCHANGE (12/7/2021); IR ABSCESS EVAL THRU EXISTING CATH (11/24/2021); IR GUIDED NEPHROSTOMY CATH EXCHANGE (11/3/2021); IR GUIDED NEPHROSTOMY CATH EXCHANGE (10/28/2021); IR GUIDED NEPHROSTOGRAM/URETEROGRAM EXISTING ACCESS (10/4/2021); IR TUBE/CATH CHANGE W CONTRAST (8/5/2021); IR GUIDED NEPHROSTOMY CATH PLACEMENT LEFT (9/16/2021); Colonoscopy (last 2/3/15); vascular surgery (Left, 4/14); hernia repair; hernia repair (3/2015, 5/2016); total colectomy (Right, 8/2014); IR TUBE/CATH CHANGE W CONTRAST (7/8/2021); IR TUBE/CATH CHANGE W CONTRAST (6/14/2021); IR ABSCESS EVAL THRU EXISTING CATH (6/4/2021); IR TUBE/CATH CHANGE W CONTRAST (5/27/2021); IR ABSCESS EVAL THRU EXISTING CATH (5/20/2021); Colonoscopy (N/A, 3/5/2021); colonoscopy thru stoma,lesn rmvl w/snare (3/5/2021);  IR REPLACE TUNNELED CVC WO SQ PORT/PUMP SAME ACCESS (5/30/2019); IR TUNNELED CVC PLACE WO SQ PORT/PUMP > 5 YEARS (5/20/2019); Colonoscopy (N/A, 2/12/2018); colonoscopy thru stoma (2/12/2018); other surgical history (Bilateral); polypectomy; other surgical history (10/7/2013); gi (4/2016); colostomy (5/11/16); CT PERITONEAL/RETROPERITONEAL PERC DRAIN (5/13/2021); IR TUNNELED CVC PLACE WO SQ PORT/PUMP > 5 YEARS (5/20/2019); CT PERITONEAL/RETROPERITONEAL PERC DRAIN (5/20/2021); and IR GUIDED NEPHROSTOMY CATH PLACEMENT LEFT (9/16/2021). Social History/Living Environment:   Lives With: Family  Type of Home: Condo  Home Layout: Multi-level  Home Access: Stairs to enter with rails  Bathroom Shower/Tub: Tub/Shower unit  Bathroom Toilet: Standard  Bathroom Accessibility: Accessible     Prior Level of Function/Work/Activity:   Prior level of function: Independent  Occupation: Retired  Receives Help From: Family  ADL Assistance: Needs assistance  Toileting: Needs assistance  Homemaking Assistance: Needs assistance  Homemaking Responsibilities: No  Ambulation Assistance: Needs assistance  Transfer Assistance: Independent  Active : Yes  Mode of Transportation: Car  No data recorded   Learning:   Does the patient/guardian have any barriers to learning?: No barriers  Will there be a co-learner?: No  What is the preferred language of the patient/guardian?: English  Is an  required?: No  How does the patient/guardian prefer to learn new concepts?: Listening; Reading; Demonstration; Pictures/Videos     Fall Risk Scale: Total Score: 85  Powers Fall Risk: High (45 and higher)     Dominant Side:  right handed        OBJECTIVE        Observation  Posture: pt stands with hips/knees slightly flexed, hips externally rotated  TUG Test: 16.3 seconds  5x sit -stand: 17.1 seconds  Bed mobility: able to half-range bridge, side scoot L/R without assistance.  Unable to R side SLR without assistance - has poor R hip flexor muscle contractions during SLR. ROM (R/L in °) AROM(PROM)  Hip flexion  (115)/(110)  Hip extension  0/0  Hip ER  (38)/(40)  Hip IR   (22)/(20)    Knee extension  (-9)/(-4)  Knee flexion   (130)/(135)    Ankle DF   (10)/(10)  Ankle PF   WNL/WNL    Strength (R/L)   Hip flexion  3+/3-  Hip ABD  3-/3+  Hip extension  4-/4-  Hip ER  4-/4-  Hip IR   3-/4-    Knee extension  11.4kg/13kg  Knee flexion   10.5kg/10kg    Ankle DF   4+/4+  Ankle PF   4-/4-  Ankle eversion  4/4+  Ankle inversion  4/4    ASSESSMENT   Initial Assessment:  Pt demonstrating limited function with transfers, slow gait speed and difficulty transferring supine-sit and sit-stand. Pt lives alone, has a higher previous functional capacity and needs better gait /transfer function to continue living alone independently. ADDENDUM: by last visit on 6/20/22, pt demonstrated slight improvement in hip adductor performance - able to adduct more reps/set. More work today on stride lengthening activities, with pt encouraged to work along countertop extending step lengths back and forth. Therapy was discharged after the 6/20/22 visit due to pt being hospitalized with other medical problems. Problem List: (Impacting functional limitations): Body Structures, Functions, Activity Limitations Requiring Skilled Therapeutic Intervention: Decreased functional mobility ; Decreased ADL status; Decreased ROM; Decreased body mechanics; Decreased strength; Decreased endurance; Decreased balance     Therapy Prognosis:   Therapy Prognosis: Good     Assessment Complexity:      PLAN   Effective Dates: 6/2/2022 TO Plan of Care/Certification Expiration Date: 08/31/22     Frequency/Duration: Plan Frequency: 2 times a week for 12 weeks     Interventions Planned (Treatment may consist of any combination of the following):    Current Treatment Recommendations: Strengthening; ROM; Balance training; Functional mobility training; Transfer training;  Endurance training; Gait training; Stair training; Home exercise program; Therapeutic activities     Goals: (Goals have been discussed and agreed upon with patient.)  Short-Term Functional Goals: Time Frame: 6 weeks (UNMET - early discharge due to hospitalization)  Independent performance of home exercise program  Pt to demonstrate improved TUG test = 13 seconds  Improved capacity to perform bed mobility with ability to right side SLR and leg cross without assist of upper extremities  Discharge Goals: Time Frame: 12 weeks   (UNMET - early discharge due to hospitalization)  Improve TUG test to 11 seconds or better  Improve 5x/sit-stand to 12 seconds or better  Improve LE strengths (quads/hams) 25% or better to allow strong stride for irregular surface walking without fall risk. Outcome Measure: Tool Used: Five Times Sit to Stand (FTSST or 5xSST)   Score:  Initial: 17.1 seconds Most Recent:  seconds (Date: -- )   Interpretation of Score: This is a measure of functional lower limb muscle strength and quantifying functional change of transitional movements. A score of 12 seconds or less indicates a normal level of function for community dwelling older individuals, a score of 15 seconds or more is at risk for fall. Tool Used: Timed Up and Go (TUG)  Score:  Initial: 16.3 seconds Most Recent: X seconds (Date: -- )   Interpretation of Score: The test measures, in seconds, the time taken by an individual to stand up from a standard arm chair (seat height 46 cm [18 in], arm height 65 cm [25.6 in]), walk a distance of 3 meters (118 in, approx 10 ft), turn, walk back to the chair and sit down. If the individual takes longer than 14 seconds to complete TUG, this indicates risk for falls. Medical Necessity:   > Outpatient rehab for this diagnosis was not completed due to early discharge (pt hospitalized due to illness).   Reason For Services/Other Comments:  > n/a  Total Duration:  Time In: 0845  Time Out: 0930    Regarding Rizwana Deutsch therapy, I certify that the treatment plan above will be carried out by a therapist or under their direction. Thank you for this referral,  Damion Maldonado PT     Referring Physician Signature: Julian Durán,  No Signature is Required for this note.         Post Session Pain  Charge Capture   POC Link  Treatment Note Link  MD Guidelines  Aarti

## 2022-08-12 ENCOUNTER — HOSPITAL ENCOUNTER (OUTPATIENT)
Dept: PHYSICAL THERAPY | Age: 77
Setting detail: RECURRING SERIES
Discharge: HOME | End: 2022-08-15
Payer: MEDICARE

## 2022-08-12 PROCEDURE — 97162 PT EVAL MOD COMPLEX 30 MIN: CPT

## 2022-08-12 PROCEDURE — 97110 THERAPEUTIC EXERCISES: CPT

## 2022-08-12 NOTE — PROGRESS NOTES
Leonor Osgood Comin  : 1945  Primary: Medicare Part A And B  Secondary: [address] [place]  [address] [facility] [address]  Phone: [phone]  Fax: [phone] Plan Frequency: 2 times a week for 12 weeks    Plan of Care/Certification Expiration Date: 11/10/22      PT Visit Info:  No data recorded   Visit Count:  1   OUTPATIENT PHYSICAL THERAPY:OP NOTE TYPE: Treatment Note 2022       Episode  }Appt Desk             Treatment Diagnosis:  Generalized Muscle Weakness (M62.81)  Difficulty in walking, Not elsewhere classified (R26.2)  Other abnormalities of gait and mobility (R26.89)  Medical/Referring Diagnosis:  Weakness [R53.1]  Referring Physician:  Kang Garland DO MD Orders:  PT Eval and Treat   Date of Onset:  Onset Date: 21     Allergies:   Patient has no known allergies. Restrictions/Precautions:  Restrictions/Precautions: Fall Risk  Required Braces or Orthoses?: No  No data recorded   Interventions Planned (Treatment may consist of any combination of the following):    Current Treatment Recommendations: Strengthening; ROM; Balance training; Functional mobility training; Transfer training; Endurance training; Gait training; Stair training; Home exercise program; Therapeutic activities     Subjective Comments:   See today's initial eval report  Initial:}    0 /10Post Session:     0   /10  Medications Last Reviewed:  2022  Updated Objective Findings:     Below findings from initial evaluation unless otherwise noted.:  Observation  Posture: R thoracic kyphosis  Gait: wide based, with no assistive device, bilaterally shortened step lengths. TUG test: 24.4 seconds, no assistive device. Pt shuffles during turns.      ROM (L/R in °) AROM(PROM)  Hip flexion  130/125  Hip extension  0/0  Hip ER  32/36  Hip IR   16/10    Knee extension  0/0  Knee flexion   140/140    Ankle DF   5/14        Strength (L/R)   Hip flexion  3-/3+  Hip ABD  4-/4-  Hip extension  4/4  Hip ER  3+/4-  Hip IR   3/3+    Knee extension  4/4  Knee flexion   4/4    Ankle DF   4/4  Ankle PF   4/4  Ankle eversion  4/4  Ankle inversion  4/4      Special Tests  Transfers sit-stand w/ bilateral chair rail assist, supine -sit with supervision, sit-supine with min assist to lift R LE onto table. Treatment     TREATMENT:   THERAPEUTIC ACTIVITY: ( see below for minutes): Therapeutic activities per grid below to improve mobility, strength, balance and coordination. Required minimal visual, verbal, manual and tactile cues to improve independence and safety with daily activities . THERAPEUTIC EXERCISE: (see below for minutes): Exercises per grid below to improve mobility, strength, balance and coordination. Required minimal verbal and manual cues to promote proper body alignment, promote proper body posture and promote proper body mechanics. Progressed resistance, range, repetitions and complexity of movement as indicated. MANUAL THERAPY: (see below for minutes): Joint mobilization and Soft tissue mobilization was utilized and necessary because of the patient's restricted joint motion, painful spasm, loss of articular motion and restricted motion of soft tissue. MODALITIES: (see below for minutes): to decrease pain   SELF CARE: (see below for minutes): Procedure(s) (per grid) utilized to improve and/or restore self-care/home management as related to dressing, bathing and grooming. Required minimal verbal cueing to facilitate activities of daily living skills and compensatory activities    Goals: (Goals have been discussed and agreed upon with patient.)  Short-Term Functional Goals: Time Frame: 6 weeks  Independent performance of home exercise program  Improve sit-stand transfers to independent without chair arm assist, supine-sit to independent. Improve TUG test score to < 20 seconds  Discharge Goals: Time Frame: 12 weeks  Improve TUG test score to < 15 seconds.   Improve R LE strengths to 4-/5 hip adductors, flexors to allow less fatigue during all transfers and overground gait  Improved standing balance: single leg > 5 seconds, narrow base/tandem to 30\" or better for fall prevention. Date: 8/12/22 (visit 1)       Modalities:                                Therapeutic Exercise: 23 min        Lower trunk rotations 3 min L/R (hooklying)        SB hamstring curls w/ cues for firm hamstring presses: 20x        Bridges - legs over physioball : x 20        Hooklying : hip IR/adduction : assisted: 10 x each L/R        Hooklying - R LE logrolling AROM-AAROM x 2 min                                               Proprioceptive Activities:                                Manual Therapy:                        Functional Activities:                                            Treatment/Session Summary:    Treatment Assessment:   See today's initial evaluation report  Communication/Consultation:  None today  Equipment provided today:  None  Recommendations/Intent for next treatment session: Next visit will focus on LE strength /coordination with emphasis on RLE reeducation for normal IR /adduction, CKC strength with emphasis  on supine-sit-stand transfers, narrow base /tandem balance, Gait training to increase step length/decrease step width.  .    Total Treatment Billable Duration:  45  minutes  Time In: 0800  Time Out: 0845    Shyam Da Silva, PT       Charge Capture  }Post Session Pain  PT Visit Info  [facility] Portal  MD Guidelines  Scanned Media  Benefits  MyChart    Future Appointments   Date Time Provider Dot Gusman   8/16/2022 10:15 AM Shyam Da Silva, PT [facility]   8/19/2022  9:15 AM [id number] INJECTION/CATH [id number] [facility]   8/19/2022  9:30 AM Shyam Da Silva PT [facility]   8/23/2022 11:45 AM Shyam Da Silva, PT [facility]   8/25/2022 10:15 AM Shyam Da Silva, PT [facility]   8/30/2022  8:45 AM Shyam Da Silva, PT [facility]   9/2/2022  8:00 AM Shyam Da Silva, PT [facility] 12/2/2022  8:15 AM TRIM LAB RESOURCE TRIM [facility]   12/9/2022  8:30 AM DO ROCHELLE [place] [facility]

## 2022-08-12 NOTE — PLAN OF CARE
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 Mr. Gilberto Bess  has a past surgical history that includes IR GUIDED NEPHROSTOGRAM/URETEROGRAM EXISTING ACCESS (12/10/2021); total colectomy (11/2013); IR GUIDED NEPHROSTOMY CATH EXCHANGE (12/7/2021); IR ABSCESS EVAL THRU EXISTING CATH (11/24/2021); IR GUIDED NEPHROSTOMY CATH EXCHANGE (11/3/2021); IR GUIDED NEPHROSTOMY CATH EXCHANGE (10/28/2021); IR GUIDED NEPHROSTOGRAM/URETEROGRAM EXISTING ACCESS (10/4/2021); IR TUBE/CATH CHANGE W CONTRAST (8/5/2021); IR GUIDED NEPHROSTOMY CATH PLACEMENT LEFT (9/16/2021); Colonoscopy (last 2/3/15); vascular surgery (Left, 4/14); hernia repair; hernia repair (3/2015, 5/2016); total colectomy (Right, 8/2014); IR TUBE/CATH CHANGE W CONTRAST (7/8/2021); IR TUBE/CATH CHANGE W CONTRAST (6/14/2021); IR ABSCESS EVAL THRU EXISTING CATH (6/4/2021); IR TUBE/CATH CHANGE W CONTRAST (5/27/2021); IR ABSCESS EVAL THRU EXISTING CATH (5/20/2021); Colonoscopy (N/A, 3/5/2021); colonoscopy thru stoma,diane rmvl w/snare (3/5/2021); IR REPLACE TUNNELED CVC WO SQ PORT/PUMP SAME ACCESS (5/30/2019); IR TUNNELED CVC PLACE WO SQ PORT/PUMP > 5 YEARS (5/20/2019); Colonoscopy (N/A, 2/12/2018); colonoscopy thru stoma (2/12/2018); other surgical history (Bilateral); polypectomy; other surgical history (10/7/2013); gi (4/2016); colostomy (5/11/16); CT PERITONEAL/RETROPERITONEAL PERC DRAIN (5/13/2021); IR TUNNELED CVC PLACE WO SQ PORT/PUMP > 5 YEARS (5/20/2019); CT PERITONEAL/RETROPERITONEAL PERC DRAIN (5/20/2021); and IR GUIDED NEPHROSTOMY CATH PLACEMENT LEFT (9/16/2021).   Social History/Living Environment:   Lives With: Family  Type of Home: Condo  Home Layout: Multi-level  Home Access: Stairs to enter with rails  Bathroom Shower/Tub: Tub/Shower unit  Bathroom Toilet: Standard  Bathroom Accessibility: Accessible     Prior Level of Function/Work/Activity:   Prior level of function: Independent  Occupation: Retired  Receives Help From: Family  ADL Assistance: Needs assistance  Toileting: Needs assistance  Homemaking Assistance: Needs assistance  Homemaking Responsibilities: No  Ambulation Assistance: Needs assistance  Transfer Assistance: Independent  Active : Yes  Mode of Transportation: Car  No data recorded   Learning:   Does the patient/guardian have any barriers to learning?: No barriers  Will there be a co-learner?: No  What is the preferred language of the patient/guardian?: English  Is an  required?: No  How does the patient/guardian prefer to learn new concepts?: Listening; Reading; Demonstration; Pictures/Videos     Fall Risk Scale: Total Score: 85  Powers Fall Risk: High (45 and higher)     Dominant Side:  right handed      OBJECTIVE     Observation  Posture: R thoracic kyphosis  Gait: wide based, with no assistive device, bilaterally shortened step lengths. TUG test: 24.4 seconds, no assistive device. Pt shuffles during turns. ROM (L/R in °) AROM(PROM)  Hip flexion  130/125  Hip extension  0/0  Hip ER  32/36  Hip IR   16/10    Knee extension  0/0  Knee flexion   140/140    Ankle DF   5/14        Strength (L/R)   Hip flexion  3-/3+  Hip ABD  4-/4-  Hip extension  4/4  Hip ER  3+/4-  Hip IR   3/3+    Knee extension  4/4  Knee flexion   4/4    Ankle DF   4/4  Ankle PF   4/4  Ankle eversion  4/4  Ankle inversion  4/4      Special Tests  Transfers sit-stand w/ bilateral chair rail assist, supine -sit with supervision, sit-supine with min assist to lift R LE onto table.      ASSESSMENT   Initial Assessment:  Impiared LE strengths, R > L with functional deficits resulting including difficulty performing supine-sit-stand transfers, self dressing, climbing/descending stairs and walking. Pt requires skilled PT to address these deficits and improve functional capacity as well as to prevent falls. Problem List: (Impacting functional limitations): Body Structures, Functions, Activity Limitations Requiring Skilled Therapeutic Intervention: Decreased functional mobility ; Decreased ADL status; Decreased ROM; Decreased body mechanics; Decreased strength; Decreased endurance; Decreased balance     Therapy Prognosis:   Therapy Prognosis: Good     Assessment Complexity:   Medium Complexity  PLAN   Effective Dates: 8/12/2022 TO Plan of Care/Certification Expiration Date: 08/31/22     Frequency/Duration: Plan Frequency: 2 times a week for 12 weeks     Interventions Planned (Treatment may consist of any combination of the following):    Current Treatment Recommendations: Strengthening; ROM; Balance training; Functional mobility training; Transfer training; Endurance training; Gait training; Stair training; Home exercise program; Therapeutic activities     Goals: (Goals have been discussed and agreed upon with patient.)  Short-Term Functional Goals: Time Frame: 6 weeks  Independent performance of home exercise program  Improve sit-stand transfers to independent without chair arm assist, supine-sit to independent. Improve TUG test score to < 20 seconds  Discharge Goals: Time Frame: 12 weeks  Improve TUG test score to < 15 seconds. Improve R LE strengths to 4-/5 hip adductors, flexors to allow less fatigue during all transfers and overground gait  Improved standing balance: single leg > 5 seconds, narrow base/tandem to 30\" or better for fall prevention. Outcome Measure: Tool Used: Timed Up and Go (TUG)  Score:  Initial: 24.4 seconds Most Recent: X seconds (Date: -- )   Interpretation of Score:  The test measures, in seconds, the time taken by an individual to stand up from a standard arm chair (seat height 46 cm [18 in], arm height 65 cm [25.6 in]), walk a distance of 3 meters (118 in, approx 10 ft), turn, walk back to the chair and sit down. If the individual takes longer than 14 seconds to complete TUG, this indicates risk for falls. Medical Necessity:   > Patient demonstrates good rehab potential due to higher previous functional level.  > Skilled intervention continues to be required due to Deficient LE strengths that hinder independent ADL function. .  Reason For Services/Other Comments:  > Patient continues to require skilled intervention due to the above noted deficits. .  Total Duration:  Time In: 0800  Time Out: 0845    Regarding Toshia Nolasco's therapy, I certify that the treatment plan above will be carried out by a therapist or under their direction.   Thank you for this referral,  Pat Friend, PT     Referring Physician Signature: Rhina Urban DO _______________________________ Date _____________        Post Session Pain  Charge Capture  PT Visit Info MD Guidelines  Bahmanhartania

## 2022-08-14 NOTE — PROGRESS NOTES
Patient's FSGs are controlled on current medication regimen.  Last A1c reviewed-   Lab Results   Component Value Date    HGBA1C 6.4 (H) 04/12/2022     Most recent fingerstick glucose reviewed- No results for input(s): POCTGLUCOSE in the last 24 hours.  Current correctional scale  Low  Maintain anti-hyperglycemic dose as follows-     · DM x over 20 years  years.    · Hold Oral hypoglycemics while patient is in the hospital.  · Currently takes:   · Lantus insulin 28-32  · Lispro Insulin with meals plus sliding scale- 12 u, am lunch & dinner 14  · Will start 18U long acting and SSI, npo at [place] for potential procedure   Subjective:  
 
Patient has complaints of nothing. Reports a large volume of stool overnight/this a.m. But none since. Denies pain, nausea. Objective:  
 
Visit Vitals /81 (BP 1 Location: Right arm, BP Patient Position: At rest;Head of bed elevated (Comment degrees)) Pulse 88 Temp 97.7 °F (36.5 °C) Resp 18 Ht 5' 8\" (1.727 m) Wt 140 lb (63.5 kg) SpO2 92% BMI 21.29 kg/m²  
; Temp (24hrs), Av.9 °F (36.6 °C), Min:97.5 °F (36.4 °C), Max:98.2 °F (36.8 °C) I/O reviewed in graphics Physical Exam:  
 General-in no distress. Lungs-CTA bilaterally  Respiratory effort isNormal 
 
Heart- Regular rate and rhythm Abdomen-distention increased  from previous discahrge, but soft. Bowel sounds are absent . There is no tenderness in the abdomen Labs:  
Recent Results (from the past 24 hour(s)) METABOLIC PANEL, BASIC Collection Time: 19  2:06 PM  
Result Value Ref Range Sodium 138 136 - 145 mmol/L Potassium 4.0 3.5 - 5.1 mmol/L Chloride 103 98 - 107 mmol/L  
 CO2 30 21 - 32 mmol/L Anion gap 5 mmol/L Glucose 132 (H) 65 - 100 mg/dL BUN 22 8 - 23 MG/DL Creatinine 0.97 0.8 - 1.5 MG/DL  
 GFR est AA >60 >60 ml/min/1.73m2 GFR est non-AA >60 ml/min/1.73m2 Calcium 8.6 8.3 - 10.4 MG/DL Data Review reviewed  CT Assessment:  
 
Principal Problem: 
  Small bowel obstruction ([facility] [address] (3/30/2019) Plan/Recommendations/Medical Decision Making:  
 
Continue present treatment Conservative treatment of recurrent SBO 
 
CT review shows fecalization of small bowel content proximal to presumed transition point implying subacute and/or chronic partial obstruction at that level. As such, surgical intervention is recommended. However, would like to give at least 24-48 hours to see if current episode will subside, allowing decompression of his significantly dilated proximal small bowel.   He is in agreement- he will have laparotomy at some point during this hospitalization.

## 2022-08-16 ENCOUNTER — HOSPITAL ENCOUNTER (OUTPATIENT)
Dept: PHYSICAL THERAPY | Age: 77
Setting detail: RECURRING SERIES
Discharge: HOME | End: 2022-08-19
Payer: MEDICARE

## 2022-08-16 PROCEDURE — 97110 THERAPEUTIC EXERCISES: CPT

## 2022-08-16 NOTE — PROGRESS NOTES
Shoaib Garciain  : 1945  Primary: Medicare Part A And B  Secondary: [address] [place]  [address] [facility] [address]  Phone: [phone]  Fax: [phone] Plan Frequency: 2 times a week for 12 weeks    Plan of Care/Certification Expiration Date: 11/10/22      PT Visit Info:  No data recorded   Visit Count:  2   OUTPATIENT PHYSICAL THERAPY:OP NOTE TYPE: Treatment Note 2022       Episode  }Appt Desk             Treatment Diagnosis:  Generalized Muscle Weakness (M62.81)  Difficulty in walking, Not elsewhere classified (R26.2)  Other abnormalities of gait and mobility (R26.89)  Medical/Referring Diagnosis:  Weakness [R53.1]  Referring Physician:  Wilberto Toribio DO MD Orders:  PT Eval and Treat   Date of Onset:  Onset Date: 21     Allergies:   Patient has no known allergies. Restrictions/Precautions:  Restrictions/Precautions: Fall Risk  Required Braces or Orthoses?: No  No data recorded   Interventions Planned (Treatment may consist of any combination of the following):    Current Treatment Recommendations: Strengthening; ROM; Balance training; Functional mobility training; Transfer training; Endurance training; Gait training; Stair training; Home exercise program; Therapeutic activities     Subjective Comments:   Stride length continues to be short. Initial:}    0 /10Post Session:     0   /10  Medications Last Reviewed:  2022  Updated Objective Findings:     Below findings from initial evaluation unless otherwise noted.:  Observation  Posture: R thoracic kyphosis  Gait: wide based, with no assistive device, bilaterally shortened step lengths. TUG test: 24.4 seconds, no assistive device. Pt shuffles during turns.      ROM (L/R in °) AROM(PROM)  Hip flexion  130/125  Hip extension  0/0  Hip ER  32/36  Hip IR   16/10    Knee extension  0/0  Knee flexion   140/140    Ankle DF   5/14        Strength (L/R)   Hip flexion 3-/3+  Hip ABD  4-/4-  Hip extension  4/4  Hip ER  3+/4-  Hip IR   3/3+    Knee extension  4/4  Knee flexion   4/4    Ankle DF   4/4  Ankle PF   4/4  Ankle eversion  4/4  Ankle inversion  4/4      Special Tests  Transfers sit-stand w/ bilateral chair rail assist, supine -sit with supervision, sit-supine with min assist to lift R LE onto table. Treatment     TREATMENT:   THERAPEUTIC ACTIVITY: ( see below for minutes): Therapeutic activities per grid below to improve mobility, strength, balance and coordination. Required minimal visual, verbal, manual and tactile cues to improve independence and safety with daily activities . THERAPEUTIC EXERCISE: (see below for minutes): Exercises per grid below to improve mobility, strength, balance and coordination. Required minimal verbal and manual cues to promote proper body alignment, promote proper body posture and promote proper body mechanics. Progressed resistance, range, repetitions and complexity of movement as indicated. MANUAL THERAPY: (see below for minutes): Joint mobilization and Soft tissue mobilization was utilized and necessary because of the patient's restricted joint motion, painful spasm, loss of articular motion and restricted motion of soft tissue. MODALITIES: (see below for minutes): to decrease pain   SELF CARE: (see below for minutes): Procedure(s) (per grid) utilized to improve and/or restore self-care/home management as related to dressing, bathing and grooming. Required minimal verbal cueing to facilitate activities of daily living skills and compensatory activities    Goals: (Goals have been discussed and agreed upon with patient.)  Short-Term Functional Goals: Time Frame: 6 weeks  Independent performance of home exercise program  Improve sit-stand transfers to independent without chair arm assist, supine-sit to independent.   Improve TUG test score to < 20 seconds  Discharge Goals: Time Frame: 12 weeks  Improve TUG test score to < 15 seconds. Improve R LE strengths to 4-/5 hip adductors, flexors to allow less fatigue during all transfers and overground gait  Improved standing balance: single leg > 5 seconds, narrow base/tandem to 30\" or better for fall prevention. Date: 22 (visit 1)  (visit 2)       Modalities:                                Therapeutic Exercise: 23 min 44 min       Lower trunk rotations 3 min L/R (hooklying) Seated stepper 6 min, level 3       SB hamstring curls w/ cues for firm hamstring presses: 20x Hip IR log rolling, end range IR stretch: 1 min each L/R,        Bridges - legs over physioball : x 20 Hip adductor aarom supine : 10 reps L/R       Hooklying : hip IR/adduction : assisted: 10 x each L/R Hip add sukhjinder in hooklying (ball squeeze) 3 x 5\" bilat       Hooklying - R LE logrolling AROM-AAROM x 2 min LTR's - legs over ball: manl resist L/R 2 x 10         Bridges: 2 x 10 x 5\"         SLR's assisted: 3 x 5 R, 2 x 5 L (leg off of table        Seated marching x 17 L/R        Seated R adductions x 10         Long stride walkin laps in // bars, f/b 79' w/ single pole        // bars: stride forw/back x 10 L/R      Proprioceptive Activities:                                Manual Therapy:                        Functional Activities:                                            Treatment/Session Summary:    Treatment Assessment:   Pt able to lengthen stride with use of assistive device, but R hip flexion, adduction all remain very weak and interfere with right stance / swing control. Communication/Consultation:  None today  Equipment provided today:  None  Recommendations/Intent for next treatment session: Next visit will focus on LE strength /coordination with emphasis on RLE reeducation for normal IR /adduction, CKC strength with emphasis  on supine-sit-stand transfers, narrow base /tandem balance, Gait training to increase step length/decrease step width.  .    Total Treatment Billable Duration:  44 minutes  Time In: 1010  Time Out: Deepa Rich 2 John, PT       Charge Capture  }Post Session Pain  PT Visit Info  MedBridge Portal  MD Guidelines  Scanned Media  Benefits  MyChart    Future Appointments   Date Time Provider Dot Gusman   8/19/2022  9:15 AM [id number] INJECTION/CATH [id number] [facility] AM[facility]   8/19/2022  9:30 AM Moriah Polite, PT [facility]   8/23/2022 11:45 AM Shellie Polite, PT [facility]   8/25/2022 10:15 AM Shellie Polite, PT [facility]   8/30/2022  8:45 AM Moriah Polite, PT [facility]   9/2/2022  8:00 AM Shellie Polite, PT [facility]   12/2/2022  8:15 AM TRIM LAB RESOURCE TRIM [facility] AMB   12/9/2022  8:30 AM Jackie Sagastume, DO TRIM [facility] AM[facility]

## 2022-08-19 ENCOUNTER — APPOINTMENT (OUTPATIENT)
Dept: PHYSICAL THERAPY | Age: 77
End: 2022-08-19
Payer: MEDICARE

## 2022-08-19 ENCOUNTER — HOSPITAL ENCOUNTER (OUTPATIENT)
Dept: PHYSICAL THERAPY | Age: 77
Setting detail: RECURRING SERIES
Discharge: HOME | End: 2022-08-22
Payer: MEDICARE

## 2022-08-19 PROCEDURE — 97110 THERAPEUTIC EXERCISES: CPT

## 2022-08-19 PROCEDURE — 97530 THERAPEUTIC ACTIVITIES: CPT

## 2022-08-19 NOTE — PROGRESS NOTES
Veronica Nolasco  : 1945  Primary: Medicare Part A And B  Secondary: [address] [place]  [address] [facility] [address]  Phone: [phone]  Fax: [phone] Plan Frequency: 2 times a week for 12 weeks    Plan of Care/Certification Expiration Date: 11/10/22      PT Visit Info:  No data recorded   Visit Count:  3   OUTPATIENT PHYSICAL THERAPY:OP NOTE TYPE: Treatment Note 2022       Episode  }Appt Desk             Treatment Diagnosis:  Generalized Muscle Weakness (M62.81)  Difficulty in walking, Not elsewhere classified (R26.2)  Other abnormalities of gait and mobility (R26.89)  Medical/Referring Diagnosis:  Weakness [R53.1]  Referring Physician:  Shira Tello DO MD Orders:  PT Eval and Treat   Date of Onset:  Onset Date: 21     Allergies:   Patient has no known allergies. Restrictions/Precautions:  Restrictions/Precautions: Fall Risk  Required Braces or Orthoses?: No  No data recorded   Interventions Planned (Treatment may consist of any combination of the following):    Current Treatment Recommendations: Strengthening; ROM; Balance training; Functional mobility training; Transfer training; Endurance training; Gait training; Stair training; Home exercise program; Therapeutic activities     Subjective Comments:   Legs are \"about the same\". Not sore after last treatment. Initial:}    0 /10Post Session:     0   /10  Medications Last Reviewed:  2022  Updated Objective Findings:     Below findings from initial evaluation unless otherwise noted.:  Observation  Posture: R thoracic kyphosis  Gait: wide based, with no assistive device, bilaterally shortened step lengths. TUG test: 24.4 seconds, no assistive device. Pt shuffles during turns.      ROM (L/R in °) AROM(PROM)  Hip flexion  130/125  Hip extension  0/0  Hip ER  32/36  Hip IR   16/10    Knee extension  0/0  Knee flexion   140/140    Ankle DF   5/14        Strength (L/R) Hip flexion  3-/3+  Hip ABD  4-/4-  Hip extension  4/4  Hip ER  3+/4-  Hip IR   3/3+    Knee extension  4/4  Knee flexion   4/4    Ankle DF   4/4  Ankle PF   4/4  Ankle eversion  4/4  Ankle inversion  4/4      Special Tests  Transfers sit-stand w/ bilateral chair rail assist, supine -sit with supervision, sit-supine with min assist to lift R LE onto table. Treatment     TREATMENT:   THERAPEUTIC ACTIVITY: ( see below for minutes): Therapeutic activities per grid below to improve mobility, strength, balance and coordination. Required minimal visual, verbal, manual and tactile cues to improve independence and safety with daily activities . THERAPEUTIC EXERCISE: (see below for minutes): Exercises per grid below to improve mobility, strength, balance and coordination. Required minimal verbal and manual cues to promote proper body alignment, promote proper body posture and promote proper body mechanics. Progressed resistance, range, repetitions and complexity of movement as indicated. MANUAL THERAPY: (see below for minutes): Joint mobilization and Soft tissue mobilization was utilized and necessary because of the patient's restricted joint motion, painful spasm, loss of articular motion and restricted motion of soft tissue. MODALITIES: (see below for minutes): to decrease pain   SELF CARE: (see below for minutes): Procedure(s) (per grid) utilized to improve and/or restore self-care/home management as related to dressing, bathing and grooming. Required minimal verbal cueing to facilitate activities of daily living skills and compensatory activities    Goals: (Goals have been discussed and agreed upon with patient.)  Short-Term Functional Goals: Time Frame: 6 weeks  Independent performance of home exercise program  Improve sit-stand transfers to independent without chair arm assist, supine-sit to independent.   Improve TUG test score to < 20 seconds  Discharge Goals: Time Frame: 12 weeks  Improve TUG test score to < 15 seconds. Improve R LE strengths to 4-/5 hip adductors, flexors to allow less fatigue during all transfers and overground gait  Improved standing balance: single leg > 5 seconds, narrow base/tandem to 30\" or better for fall prevention. Date: 22 (visit 1) 81 (visit 2)  22 (visit 3)      Modalities:                                Therapeutic Exercise: 23 min 44 min 38 min      Lower trunk rotations 3 min L/R (hooklying) Seated stepper 6 min, level 3 Seated stepper: 8 min, level 3 resist.       SB hamstring curls w/ cues for firm hamstring presses: 20x Hip IR log rolling, end range IR stretch: 1 min each L/R,  R hip Logrolling manl.  Resist w/ IR x 10 w/ hip neutral,  2 x 8 w/ hip flexed       Bridges - legs over physioball : x 20 Hip adductor aarom supine : 10 reps L/R Hip ER manl resist 2 x 10 hooklying      Hooklying : hip IR/adduction : assisted: 10 x each L/R Hip add sukhjinder in hooklying (ball squeeze) 3 x 5\" bilat Alternating R hip isometrics holding in hooklying ER/IR: 2 x 10      Hooklying - R LE logrolling AROM-AAROM x 2 min LTR's - legs over ball: manl resist L/R 2 x 10  SB hamstring curls x 20        Bridges: 2 x 10 x 5\"  Bridgin x w/ legs over bolster, f/b single leg bridges L/R x 10        SLR's assisted: 3 x 5 R, 2 x 5 L (leg off of table        Seated marching x 17 L/R        Seated R adductions x 10         Long stride walkin laps in // bars, f/b 79' w/ single pole        // bars: stride forw/back x 10 L/R      Proprioceptive Activities:                                Manual Therapy:                        Functional Activities:   15 min        Sit-stands : 10, f/b 3 x 10 w 8# med ball, elevated seat        Tandem walking in // bars 5 laps, 2 and 1 hand support        [place] walking in // bars - 2 hand support : 3 laps        Long  stride walking  x 60' w/ skipole R        Rapid sride walking x 120'          Treatment/Session Summary:    Treatment Assessment:    With hand support  (rails, pole), pt is able to correct gait but with slow velocity. He responded better to cues for speed, so will use this more at next visit. Communication/Consultation:  None today  Equipment provided today:  None  Recommendations/Intent for next treatment session: Next visit will focus on LE strength /coordination with emphasis on RLE reeducation for normal IR /adduction, CKC strength with emphasis  on supine-sit-stand transfers, narrow base /tandem balance, Gait training to increase step length/decrease step width.  .    Total Treatment Billable Duration:  53 minutes  Time In: 3531  Time Out: [address] [facility] Kuran, PT       Charge Capture  }Post Session Pain  PT Visit Info  MedBridge Portal  MD Guidelines  Scanned Media  Benefits  MyChart    Future Appointments   Date Time Provider Dot Gusman   8/22/2022  9:00 AM [id number] INJECTION/CATH [id number] [facility] AMB   8/23/2022 11:45 AM Lodema Nageotte, PT [facility]   8/25/2022 10:15 AM Lodema Nageotte, PT [facility]   8/30/2022  8:45 AM Lodema Nageotte, PT [facility]   9/2/2022  8:00 AM Lodema Nageotte, PT [facility]   9/7/2022 10:15 AM Lodema Nageotte, PT [facility]   9/13/2022  8:45 AM Lodema Nageotte, PT [facility]   9/15/2022  8:45 AM Lodema Nageotte, PT [facility]   9/20/2022  9:30 AM [facility]   9/22/2022  8:45 AM Lodema Nageotte, PT [facility]   9/27/2022  9:30 AM Lodema Nageotte, PT [facility]   9/29/2022  8:45 AM Lodema Nageotte, PT [facility]   12/2/2022  8:15 AM TRIM LAB RESOURCE TRIM [facility] AMB   12/9/2022  8:30 AM Kirk Dinh, DO TRIM [facility] AMB

## 2022-08-22 ENCOUNTER — APPOINTMENT (OUTPATIENT)
Dept: PHYSICAL THERAPY | Age: 77
End: 2022-08-22
Payer: MEDICARE

## 2022-08-22 ENCOUNTER — NURSE ONLY (OUTPATIENT)
Dept: UROLOGY | Age: 77
End: 2022-08-22
Payer: MEDICARE

## 2022-08-22 DIAGNOSIS — Z46.6 URINARY CATHETER CHANGE REQUIRED: ICD-10-CM

## 2022-08-22 DIAGNOSIS — R33.9 URINARY RETENTION: Primary | ICD-10-CM

## 2022-08-22 PROCEDURE — 51702 INSERT TEMP BLADDER CATH: CPT | Performed by: UROLOGY

## 2022-08-22 NOTE — PROGRESS NOTES
Pt came in for q 4 weeks catheter change. 20f coude carr catheter changed without incident. Patient tolerated procedure well. yes/grab bars

## 2022-08-23 ENCOUNTER — HOSPITAL ENCOUNTER (OUTPATIENT)
Dept: PHYSICAL THERAPY | Age: 77
Setting detail: RECURRING SERIES
Discharge: HOME | End: 2022-08-26
Payer: MEDICARE

## 2022-08-23 PROCEDURE — 97530 THERAPEUTIC ACTIVITIES: CPT

## 2022-08-23 PROCEDURE — 97110 THERAPEUTIC EXERCISES: CPT

## 2022-08-23 NOTE — PROGRESS NOTES
[place]  : 1945  Primary: Medicare Part A And B  Secondary: [address] [place]  [address] [facility] [address]  Phone: [phone]  Fax: [phone] Plan Frequency: 2 times a week for 12 weeks    Plan of Care/Certification Expiration Date: 11/10/22      PT Visit Info:  No data recorded   Visit Count:  4   OUTPATIENT PHYSICAL THERAPY:OP NOTE TYPE: Treatment Note 2022       Episode  }Appt Desk             Treatment Diagnosis:  Generalized Muscle Weakness (M62.81)  Difficulty in walking, Not elsewhere classified (R26.2)  Other abnormalities of gait and mobility (R26.89)  Medical/Referring Diagnosis:  Weakness [R53.1]  Referring Physician:  Kamilah Landis DO MD Orders:  PT Eval and Treat   Date of Onset:  Onset Date: 21     Allergies:   Patient has no known allergies. Restrictions/Precautions:  Restrictions/Precautions: Fall Risk  Required Braces or Orthoses?: No  No data recorded   Interventions Planned (Treatment may consist of any combination of the following):    Current Treatment Recommendations: Strengthening; ROM; Balance training; Functional mobility training; Transfer training; Endurance training; Gait training; Stair training; Home exercise program; Therapeutic activities     Subjective Comments:   ISQ - no changes noted from last visit. Initial:}    0 /10Post Session:     0   /10  Medications Last Reviewed:  2022  Updated Objective Findings:     Below findings from initial evaluation unless otherwise noted.:  Observation  Posture: R thoracic kyphosis  Gait: wide based, with no assistive device, bilaterally shortened step lengths. TUG test: 24.4 seconds, no assistive device. Pt shuffles during turns.      ROM (L/R in °) AROM(PROM)  Hip flexion  130/125  Hip extension  0/0  Hip ER  32/36  Hip IR   16/10    Knee extension  0/0  Knee flexion   140/140    Ankle DF   5/14        Strength (L/R)   Hip flexion 3-/3+  Hip ABD  4-/4-  Hip extension  4/4  Hip ER  3+/4-  Hip IR   3/3+    Knee extension  4/4  Knee flexion   4/4    Ankle DF   4/4  Ankle PF   4/4  Ankle eversion  4/4  Ankle inversion  4/4      Special Tests  Transfers sit-stand w/ bilateral chair rail assist, supine -sit with supervision, sit-supine with min assist to lift R LE onto table. Treatment     TREATMENT:   THERAPEUTIC ACTIVITY: ( see below for minutes): Therapeutic activities per grid below to improve mobility, strength, balance and coordination. Required minimal visual, verbal, manual and tactile cues to improve independence and safety with daily activities . THERAPEUTIC EXERCISE: (see below for minutes): Exercises per grid below to improve mobility, strength, balance and coordination. Required minimal verbal and manual cues to promote proper body alignment, promote proper body posture and promote proper body mechanics. Progressed resistance, range, repetitions and complexity of movement as indicated. MANUAL THERAPY: (see below for minutes): Joint mobilization and Soft tissue mobilization was utilized and necessary because of the patient's restricted joint motion, painful spasm, loss of articular motion and restricted motion of soft tissue. MODALITIES: (see below for minutes): to decrease pain   SELF CARE: (see below for minutes): Procedure(s) (per grid) utilized to improve and/or restore self-care/home management as related to dressing, bathing and grooming. Required minimal verbal cueing to facilitate activities of daily living skills and compensatory activities    Goals: (Goals have been discussed and agreed upon with patient.)  Short-Term Functional Goals: Time Frame: 6 weeks  Independent performance of home exercise program  Improve sit-stand transfers to independent without chair arm assist, supine-sit to independent.   Improve TUG test score to < 20 seconds  Discharge Goals: Time Frame: 12 weeks  Improve TUG test score to < 15 seconds. Improve R LE strengths to 4-/5 hip adductors, flexors to allow less fatigue during all transfers and overground gait  Improved standing balance: single leg > 5 seconds, narrow base/tandem to 30\" or better for fall prevention. Date: 22 (visit 1) 81 (visit 2)  22 (visit 3)  22 (visit 4)     Modalities:                                Therapeutic Exercise: 23 min 44 min 38 min 24 min     Lower trunk rotations 3 min L/R (hooklying) Seated stepper 6 min, level 3 Seated stepper: 8 min, level 3 resist.  Seated stepper: 8 min, level 3 resist.      SB hamstring curls w/ cues for firm hamstring presses: 20x Hip IR log rolling, end range IR stretch: 1 min each L/R,  R hip Logrolling manl.  Resist w/ IR x 10 w/ hip neutral,  2 x 8 w/ hip flexed  R hip log rolling: manl resis: 2 x 10 neutral, 2 x 8 hooklying     Bridges - legs over physioball : x 20 Hip adductor aarom supine : 10 reps L/R Hip ER manl resist 2 x 10 hooklying R hip hooklying ER manl resist: 2 x 10      Hooklying : hip IR/adduction : assisted: 10 x each L/R Hip add sukhjinder in hooklying (ball squeeze) 3 x 5\" bilat Alternating R hip isometrics holding in hooklying ER/IR: 2 x 10 Bridges; R hemipelvis manl resist: 2 x 10      Hooklying - R LE logrolling AROM-AAROM x 2 min LTR's - legs over ball: manl resist L/R 2 x 10  SB hamstring curls x 20        Bridges: 2 x 10 x 5\"  Bridgin x w/ legs over bolster, f/b single leg bridges L/R x 10        SLR's assisted: 3 x 5 R, 2 x 5 L (leg off of table        Seated marching x 17 L/R        Seated R adductions x 10         Long stride walkin laps in // bars, f/b 70' w/ single pole        // bars: stride forw/back x 10 L/R      Proprioceptive Activities:                                Manual Therapy:                        Functional Activities:   15 min 20 min       Sit-stands : 10, f/b 3 x 10 w 8# med ball, elevated seat Sit- stands: 10x, f;b/ 4 x 10 holding 8# medball       Tandem walking in // bars 5 laps, 2 and 1 hand support Tandem walking 1 hand support - 4 laps       Caroca walking in // bars - 2 hand support : 3 laps Carioca in // bars - 2 laps L/R,  hands on bars support       Long  stride walking  x 60' w/ skipole R Rapid stride walkin'       Rapid sride walking x 120'          Treatment/Session Summary:    Treatment Assessment:    Base is narrower with walking, better gait speed as well. Communication/Consultation:  None today  Equipment provided today:  None  Recommendations/Intent for next treatment session: Next visit will focus on LE strength /coordination with emphasis on RLE reeducation for normal IR /adduction, CKC strength with emphasis  on supine-sit-stand transfers, narrow base /tandem balance, Gait training to increase step length/decrease step width.  .    Total Treatment Billable Duration:  44 minutes  Time In: 1150  Time Out: 1160 [facility] Kylemary carmen, PT       Charge Capture  }Post Session Pain  PT Visit Info  [email or web address] Portal  MD Guidelines  Scanned Media  Benefits  MyChart    Future Appointments   Date Time Provider Dot Gusman   2022 10:15 AM Shonda Money, PT [facility]   2022  8:45 AM Shonda Money, PT [facility]   2022  8:00 AM Shonda Money, PT [facility]   2022 10:15 AM Shonda Money, PT [facility]   2022  8:45 AM Shonda Money, PT [facility]   9/15/2022  8:45 AM Shonda Money, PT [facility]   2022  9:15 AM [id number] INJECTION/CATH [id number] [facility]   2022  9:30 AM Shonda Money, PT [facility]   2022  8:45 AM Shonda Money, PT [facility]   2022  9:30 AM Shonda Money, PT [facility]   2022  8:45 AM Shonda Money, PT [facility]   2022  8:15 AM TRIM LAB RESOURCE TRIM [facility] AM[facility]   2022  8:30 AM DO ROCHELLE Bhatt [facility]

## 2022-08-25 ENCOUNTER — HOSPITAL ENCOUNTER (OUTPATIENT)
Dept: PHYSICAL THERAPY | Age: 77
Setting detail: RECURRING SERIES
Discharge: HOME | End: 2022-08-28
Payer: MEDICARE

## 2022-08-25 PROCEDURE — 97110 THERAPEUTIC EXERCISES: CPT

## 2022-08-25 PROCEDURE — 97530 THERAPEUTIC ACTIVITIES: CPT

## 2022-08-25 NOTE — PROGRESS NOTES
[place]  : 1945  Primary: Medicare Part A And B  Secondary: [address] [place]  [address] [facility] [address]  Phone: [phone]  Fax: [phone] Plan Frequency: 2 times a week for 12 weeks    Plan of Care/Certification Expiration Date: 11/10/22      PT Visit Info:  No data recorded   Visit Count:  5   OUTPATIENT PHYSICAL THERAPY:OP NOTE TYPE: Treatment Note 2022       Episode  }Appt Desk             Treatment Diagnosis:  Generalized Muscle Weakness (M62.81)  Difficulty in walking, Not elsewhere classified (R26.2)  Other abnormalities of gait and mobility (R26.89)  Medical/Referring Diagnosis:  Weakness [R53.1]  Referring Physician:  Annalise Salazar DO MD Orders:  PT Eval and Treat   Date of Onset:  Onset Date: 21     Allergies:   Patient has no known allergies. Restrictions/Precautions:  Restrictions/Precautions: Fall Risk  Required Braces or Orthoses?: No  No data recorded   Interventions Planned (Treatment may consist of any combination of the following):    Current Treatment Recommendations: Strengthening; ROM; Balance training; Functional mobility training; Transfer training; Endurance training; Gait training; Stair training; Home exercise program; Therapeutic activities     Subjective Comments:   Pt notes no muscle soreness after treatments recently. Initial:}    0 /10Post Session:     0   /10  Medications Last Reviewed:  2022  Updated Objective Findings:     Below findings from initial evaluation unless otherwise noted.:  Observation  Posture: R thoracic kyphosis  Gait: wide based, with no assistive device, bilaterally shortened step lengths. TUG test: 24.4 seconds, no assistive device. Pt shuffles during turns.      ROM (L/R in °) AROM(PROM)  Hip flexion  130/125  Hip extension  0/0  Hip ER  32/36  Hip IR   16/10    Knee extension  0/0  Knee flexion   140/140    Ankle DF   5/14        Strength (L/R) 15 seconds. Improve R LE strengths to 4-/5 hip adductors, flexors to allow less fatigue during all transfers and overground gait  Improved standing balance: single leg > 5 seconds, narrow base/tandem to 30\" or better for fall prevention. Date: 22 (visit 1)  (visit 2)  22 (visit 3)  22 (visit 4)  22 (visit 5)    Modalities:                                Therapeutic Exercise: 23 min 44 min 38 min 24 min 20 min    Lower trunk rotations 3 min L/R (hooklying) Seated stepper 6 min, level 3 Seated stepper: 8 min, level 3 resist.  Seated stepper: 8 min, level 3 resist.  Seated stepper: 8 min, level 5 resist.     SB hamstring curls w/ cues for firm hamstring presses: 20x Hip IR log rolling, end range IR stretch: 1 min each L/R,  R hip Logrolling manl.  Resist w/ IR x 10 w/ hip neutral,  2 x 8 w/ hip flexed  R hip log rolling: manl resis: 2 x 10 neutral, 2 x 8 hooklying Seated hip flexion with mid range holds; 2 x 8    Bridges - legs over physioball : x 20 Hip adductor aarom supine : 10 reps L/R Hip ER manl resist 2 x 10 hooklying R hip hooklying ER manl resist: 2 x 10  Seated hip add squeezes 2 x 6    Hooklying : hip IR/adduction : assisted: 10 x each L/R Hip add sukhjinder in hooklying (ball squeeze) 3 x 5\" bilat Alternating R hip isometrics holding in hooklying ER/IR: 2 x 10 Bridges; R hemipelvis manl resist: 2 x 10  Supine logrolling aarom x 1 min, f/b holds in IR: 2 x 8    Hooklying - R LE logrolling AROM-AAROM x 2 min LTR's - legs over ball: manl resist L/R 2 x 10  SB hamstring curls x 20   Hooklying hip ER 2 x 8     Bridges: 2 x 10 x 5\"  Bridgin x w/ legs over bolster, f/b single leg bridges L/R x 10   Bridge w/ legs over bolster: 2 x 10, manl resist R hemipelvis     SLR's assisted: 3 x 5 R, 2 x 5 L (leg off of table        Seated marching x 17 L/R        Seated R adductions x 10         Long stride walkin laps in // bars, f/b 79' w/ single pole        // bars: stride forw/back x 10 L/R      Proprioceptive Activities:                                Manual Therapy:                        Functional Activities:   15 min 20 min 25 min      Sit-stands : 10, f/b 3 x 10 w 8# med ball, elevated seat Sit- stands: 10x, f;b/ 4 x 10 holding 8# medball Sit stands w/ 2\" seat cushion: 8x, f/b half step stride position sit-stands: 3 x 8 L, 2 x 8 R      Tandem walking in // bars 5 laps, 2 and 1 hand support Tandem walking 1 hand support - 4 laps Repeat: 5 laps      Caroca walking in // bars - 2 hand support : 3 laps Carioca in // bars - 2 laps L/R,  hands on bars support Repeat      Long  stride walking  x 60' w/ skipole R Rapid stride walkin' Long stride walking in // bars : 8 laps      Rapid sride walking x 120'          Treatment/Session Summary:    Treatment Assessment:    Good respone with practicing longer strides in // bars - next step will be to decrease assist from 2 hands to 1 with this. Communication/Consultation:  None today  Equipment provided today:  None  Recommendations/Intent for next treatment session: Next visit will focus on LE strength /coordination with emphasis on RLE reeducation for normal IR /adduction, CKC strength with emphasis  on supine-sit-stand transfers, narrow base /tandem balance, Gait training to increase step length/decrease step width.  .    Total Treatment Billable Duration:  45 minutes  Time In: 3636  Time Out: [address] Kurfman, PT       Charge Capture  }Post Session Pain  PT Visit Info  [employer] Portal  MD Guidelines  Scanned Media  Benefits  MyChart    Future Appointments   Date Time Provider Dot Gusman   2022  8:45 AM Kamaljit Mcdonald, PT [facility]   2022  8:00 AM Doy Cuna, PT [facility]   2022 10:15 AM Doy Cuna, PT [facility]   2022  8:45 AM Doy Cuna, PT [facility]   9/15/2022  8:45 AM Doy Cuna, PT [facility]   2022  9:15 AM [id number] INJECTION/CATH [id number] [facility] AMB   2022  9:30 AM Ximena Thompson Brittney Beaulieu, PT [facility]   9/22/2022  8:45 AM Xiomara Zuñiga, PT [facility]   9/27/2022  9:30 AM Xiomara Zuñiga, PT [facility]   9/29/2022  8:45 AM Xiomara Zuñiga, PT [facility]   12/2/2022  8:15 AM TRIM LAB RESOURCE TRIM [facility] AM[facility]   12/9/2022  8:30 AM Bill Camacho DO TRIM [facility] AMB

## 2022-08-26 ENCOUNTER — APPOINTMENT (OUTPATIENT)
Dept: PHYSICAL THERAPY | Age: 77
End: 2022-08-26
Payer: MEDICARE

## 2022-08-30 ENCOUNTER — HOSPITAL ENCOUNTER (OUTPATIENT)
Dept: PHYSICAL THERAPY | Age: 77
Setting detail: RECURRING SERIES
Discharge: HOME | End: 2022-09-02
Payer: MEDICARE

## 2022-08-30 PROCEDURE — 97530 THERAPEUTIC ACTIVITIES: CPT

## 2022-08-30 PROCEDURE — 97110 THERAPEUTIC EXERCISES: CPT

## 2022-08-30 NOTE — PROGRESS NOTES
[place]olas[place]  : 1945  Primary: Medicare Part A And B  Secondary: [address] [place]  [address] [facility] [address]  Phone: [phone]  Fax: [phone] Plan Frequency: 2 times a week for 12 weeks    Plan of Care/Certification Expiration Date: 11/10/22      PT Visit Info:  No data recorded   Visit Count:  6   OUTPATIENT PHYSICAL THERAPY:OP NOTE TYPE: Treatment Note 2022       Episode  }Appt Desk             Treatment Diagnosis:  Generalized Muscle Weakness (M62.81)  Difficulty in walking, Not elsewhere classified (R26.2)  Other abnormalities of gait and mobility (R26.89)  Medical/Referring Diagnosis:  Weakness [R53.1]  Referring Physician:  Uri Morales DO MD Orders:  PT Eval and Treat   Date of Onset:  Onset Date: 21     Allergies:   Patient has no known allergies. Restrictions/Precautions:  Restrictions/Precautions: Fall Risk  Required Braces or Orthoses?: No  No data recorded   Interventions Planned (Treatment may consist of any combination of the following):    Current Treatment Recommendations: Strengthening; ROM; Balance training; Functional mobility training; Transfer training; Endurance training; Gait training; Stair training; Home exercise program; Therapeutic activities     Subjective Comments:   Pt noting that his walking pattern is \" about the same\". Initial:}    0 10Post Session:     0   10  Medications Last Reviewed:  2022  Updated Objective Findings:     Below findings from initial evaluation unless otherwise noted.:  Observation  Posture: R thoracic kyphosis  Gait: wide based, with no assistive device, bilaterally shortened step lengths. TUG test: 24.4 seconds, no assistive device. Pt shuffles during turns.      ROM (L/R in °) AROM(PROM)  Hip flexion  130/125  Hip extension  0/0  Hip ER  32/36  Hip IR   16/10    Knee extension  0/0  Knee flexion   140/140    Ankle DF   5/14        Strength (L/R) Hip flexion  3-/3+  Hip ABD  4-/4-  Hip extension  4/4  Hip ER  3+/4-  Hip IR   3/3+    Knee extension  4/4  Knee flexion   4/4    Ankle DF   4/4  Ankle PF   4/4  Ankle eversion  4/4  Ankle inversion  4/4      Special Tests  Transfers sit-stand w/ bilateral chair rail assist, supine -sit with supervision, sit-supine with min assist to lift R LE onto table. Treatment     TREATMENT:   THERAPEUTIC ACTIVITY: ( see below for minutes): Therapeutic activities per grid below to improve mobility, strength, balance and coordination. Required minimal visual, verbal, manual and tactile cues to improve independence and safety with daily activities . THERAPEUTIC EXERCISE: (see below for minutes): Exercises per grid below to improve mobility, strength, balance and coordination. Required minimal verbal and manual cues to promote proper body alignment, promote proper body posture and promote proper body mechanics. Progressed resistance, range, repetitions and complexity of movement as indicated. MANUAL THERAPY: (see below for minutes): Joint mobilization and Soft tissue mobilization was utilized and necessary because of the patient's restricted joint motion, painful spasm, loss of articular motion and restricted motion of soft tissue. MODALITIES: (see below for minutes): to decrease pain   SELF CARE: (see below for minutes): Procedure(s) (per grid) utilized to improve and/or restore self-care/home management as related to dressing, bathing and grooming. Required minimal verbal cueing to facilitate activities of daily living skills and compensatory activities    Goals: (Goals have been discussed and agreed upon with patient.)  Short-Term Functional Goals: Time Frame: 6 weeks  Independent performance of home exercise program  Improve sit-stand transfers to independent without chair arm assist, supine-sit to independent.   Improve TUG test score to < 20 seconds  Discharge Goals: Time Frame: 12 weeks  Improve TUG test score to < 15 seconds. Improve R LE strengths to 4-/5 hip adductors, flexors to allow less fatigue during all transfers and overground gait  Improved standing balance: single leg > 5 seconds, narrow base/tandem to 30\" or better for fall prevention. Date: 22 (visit 1)  (visit 2)  22 (visit 3)  22 (visit 4)  22 (visit 5)  22 (visit 6)    Modalities:                                    Therapeutic Exercise: 23 min 44 min 38 min 24 min 20 min 20 min    Lower trunk rotations 3 min L/R (hooklying) Seated stepper 6 min, level 3 Seated stepper: 8 min, level 3 resist.  Seated stepper: 8 min, level 3 resist.  Seated stepper: 8 min, level 5 resist.  Seated stepper: 8 min, level 5 resist.    SB hamstring curls w/ cues for firm hamstring presses: 20x Hip IR log rolling, end range IR stretch: 1 min each L/R,  R hip Logrolling manl.  Resist w/ IR x 10 w/ hip neutral,  2 x 8 w/ hip flexed  R hip log rolling: manl resis: 2 x 10 neutral, 2 x 8 hooklying Seated hip flexion with mid range holds; 2 x 8 Supine R hip active logrollinx    Bridges - legs over physioball : x 20 Hip adductor aarom supine : 10 reps L/R Hip ER manl resist 2 x 10 hooklying R hip hooklying ER manl resist: 2 x 10  Seated hip add squeezes 2 x 6 Supine assisted R SLR's 2 x 8    Hooklying : hip IR/adduction : assisted: 10 x each L/R Hip add sukhjinder in hooklying (ball squeeze) 3 x 5\" bilat Alternating R hip isometrics holding in hooklying ER/IR: 2 x 10 Bridges; R hemipelvis manl resist: 2 x 10  Supine logrolling aarom x 1 min, f/b holds in IR: 2 x 8 Seated hip adduction ball squeezes 2 x 10 x 5\"    Hooklying - R LE logrolling AROM-AAROM x 2 min LTR's - legs over ball: manl resist L/R 2 x 10  SB hamstring curls x 20   Hooklying hip ER 2 x 8 Bridges w/ legs over bolster, manl R pelvis resist. 2 x 10      Bridges: 2 x 10 x 5\"  Bridgin x w/ legs over bolster, f/b single leg bridges L/R x 10   Bridge w/ legs over bolster: 2 x 10, manl resist R hemipelvis      SLR's assisted: 3 x 5 R, 2 x 5 L (leg off of table         Seated marching x 17 L/R         Seated R adductions x 10          Long stride walkin laps in // bars, f/b 70' w/ single pole         // bars: stride forw/back x 10 L/R       Proprioceptive Activities:                                    Manual Therapy:                           Functional Activities:   15 min 20 min 25 min 25 min      Sit-stands : 10, f/b 3 x 10 w 8# med ball, elevated seat Sit- stands: 10x, f;b/ 4 x 10 holding 8# medball Sit stands w/ 2\" seat cushion: 8x, f/b half step stride position sit-stands: 3 x 8 L, 2 x 8 R High march steps in // bars: 5 laps      Tandem walking in // bars 5 laps, 2 and 1 hand support Tandem walking 1 hand support - 4 laps Repeat: 5 laps Long stride steps in // bars 5 laps      Caroca walking in // bars - 2 hand support : 3 laps Carioca in // bars - 2 laps L/R,  hands on bars support Repeat Fast walking (timed) 2x20' x 8 sets      Long  stride walking  x 60' w/ skipole R Rapid stride walkin' Long stride walking in // bars : 8 laps Carioca in // bars: front and back cross overs x 6 laps      Rapid sride walking x 120'    Forward lunges into pads: 2 x 15 L/R       Treatment/Session Summary:    Treatment Assessment:    Pt able to perform high march stepping with minimal hand support. Carioca stepping is very challenging, pt uses very narrow base and requires hands on rails in parallel bars to do so safely at this point. Communication/Consultation:  None today  Equipment provided today:  None  Recommendations/Intent for next treatment session: Next visit will focus on LE strength /coordination with emphasis on RLE reeducation for normal IR /adduction, CKC strength with emphasis  on supine-sit-stand transfers, narrow base /tandem balance, Gait training to increase step length/decrease step width.  .    Total Treatment Billable Duration:  45 minutes  Time In: 0845  Time Out: 930    Rony SCHWARTZ Furman Goltz, PT       Charge Capture  }Post Session Pain  PT Visit Info  [employer] Portal  MD Guidelines  Scanned Media  Benefits  MyChart    Future Appointments   Date Time Provider Dot Uzma   9/2/2022  8:00 AM José Manuel Thomas, PT [facility]   9/7/2022 10:15 AM José Manuel Thomas, PT [facility]   9/13/2022  8:45 AM José Manuel Thomas, PT [facility]   9/15/2022  8:45 AM José Manuel Thomas, PT [facility]   9/19/2022  9:15 AM [id number] INJECTION/CATH [id number] [facility]   9/20/2022  9:30 AM José Manuel Thomas, PT [facility]   9/22/2022  8:45 AM [facility]   9/27/2022  9:30 AM [facility]   9/29/2022  8:45 AM José Manuel Thomas, PT [facility]   12/2/2022  8:15 AM TRIM LAB RESOURCE TRIM [facility] AM[facility]   12/9/2022  8:30 AM Sandie Foster DO TRIM [facility] AMB

## 2022-09-02 ENCOUNTER — NURSE ONLY (OUTPATIENT)
Dept: UROLOGY | Age: 77
End: 2022-09-02

## 2022-09-02 ENCOUNTER — HOSPITAL ENCOUNTER (OUTPATIENT)
Dept: PHYSICAL THERAPY | Age: 77
Setting detail: RECURRING SERIES
Discharge: HOME | End: 2022-09-05
Payer: MEDICARE

## 2022-09-02 DIAGNOSIS — N32.89 BLADDER SPASM: Primary | ICD-10-CM

## 2022-09-02 PROCEDURE — 97110 THERAPEUTIC EXERCISES: CPT

## 2022-09-02 PROCEDURE — 97530 THERAPEUTIC ACTIVITIES: CPT

## 2022-09-02 RX ORDER — HYOSCYAMINE SULFATE 0.12 MG/1
1 TABLET SUBLINGUAL EVERY 6 HOURS PRN
Qty: 60 EACH | Refills: 1 | Status: ON HOLD | OUTPATIENT
Start: 2022-09-02 | End: 2022-10-23 | Stop reason: HOSPADM

## 2022-09-02 NOTE — PROGRESS NOTES
Mundo Nolasco  : 1945  Primary: Medicare Part A And B  Secondary: [address] [facility] [place]  [address] [facility] [address] [address]  Phone: [phone]  Fax: [phone] Plan Frequency: 2 times a week for 12 weeks    Plan of Care/Certification Expiration Date: 11/10/22      PT Visit Info:  No data recorded   Visit Count:  7   OUTPATIENT PHYSICAL THERAPY:OP NOTE TYPE: Treatment Note 2022       Episode  }Appt Desk             Treatment Diagnosis:  Generalized Muscle Weakness (M62.81)  Difficulty in walking, Not elsewhere classified (R26.2)  Other abnormalities of gait and mobility (R26.89)  Medical/Referring Diagnosis:  Weakness [R53.1]  Referring Physician:  Mike Gooden DO MD Orders:  PT Eval and Treat   Date of Onset:  Onset Date: 21     Allergies:   Patient has no known allergies. Restrictions/Precautions:  Restrictions/Precautions: Fall Risk  Required Braces or Orthoses?: No  No data recorded   Interventions Planned (Treatment may consist of any combination of the following):    Current Treatment Recommendations: Strengthening; ROM; Balance training; Functional mobility training; Transfer training; Endurance training; Gait training; Stair training; Home exercise program; Therapeutic activities     Subjective Comments:   Walking a bit slower today but doesn't feel sore or stiff. Initial:}    0 /10Post Session:     0   /10  Medications Last Reviewed:  2022  Updated Objective Findings:     Below findings from initial evaluation unless otherwise noted.:  Observation  Posture: R thoracic kyphosis  Gait: wide based, with no assistive device, bilaterally shortened step lengths. TUG test: 24.4 seconds, no assistive device. Pt shuffles during turns.      ROM (L/R in °) AROM(PROM)  Hip flexion  130/125  Hip extension  0/0  Hip ER  32/36  Hip IR   16/10    Knee extension  0/0  Knee flexion   140/140    Ankle DF   5/14        Strength (L/R) Hip flexion  3-/3+  Hip ABD  4-/4-  Hip extension  4/4  Hip ER  3+/4-  Hip IR   3/3+    Knee extension  4/4  Knee flexion   4/4    Ankle DF   4/4  Ankle PF   4/4  Ankle eversion  4/4  Ankle inversion  4/4      Special Tests  Transfers sit-stand w/ bilateral chair rail assist, supine -sit with supervision, sit-supine with min assist to lift R LE onto table. Treatment     TREATMENT:   THERAPEUTIC ACTIVITY: ( see below for minutes): Therapeutic activities per grid below to improve mobility, strength, balance and coordination. Required minimal visual, verbal, manual and tactile cues to improve independence and safety with daily activities . THERAPEUTIC EXERCISE: (see below for minutes): Exercises per grid below to improve mobility, strength, balance and coordination. Required minimal verbal and manual cues to promote proper body alignment, promote proper body posture and promote proper body mechanics. Progressed resistance, range, repetitions and complexity of movement as indicated. MANUAL THERAPY: (see below for minutes): Joint mobilization and Soft tissue mobilization was utilized and necessary because of the patient's restricted joint motion, painful spasm, loss of articular motion and restricted motion of soft tissue. MODALITIES: (see below for minutes): to decrease pain   SELF CARE: (see below for minutes): Procedure(s) (per grid) utilized to improve and/or restore self-care/home management as related to dressing, bathing and grooming. Required minimal verbal cueing to facilitate activities of daily living skills and compensatory activities    Goals: (Goals have been discussed and agreed upon with patient.)  Short-Term Functional Goals: Time Frame: 6 weeks  Independent performance of home exercise program  Improve sit-stand transfers to independent without chair arm assist, supine-sit to independent.   Improve TUG test score to < 20 seconds  Discharge Goals: Time Frame: 12 weeks  Improve TUG test score to < 15 seconds. Improve R LE strengths to 4-/5 hip adductors, flexors to allow less fatigue during all transfers and overground gait  Improved standing balance: single leg > 5 seconds, narrow base/tandem to 30\" or better for fall prevention. Date: 22 (visit 1)  (visit 2)  22 (visit 3)  22 (visit 4)  22 (visit 5)  22 (visit 6)  22 (visit 7)   Modalities:                                        Therapeutic Exercise: 23 min 44 min 38 min 24 min 20 min 20 min 30 min    Lower trunk rotations 3 min L/R (hooklying) Seated stepper 6 min, level 3 Seated stepper: 8 min, level 3 resist.  Seated stepper: 8 min, level 3 resist.  Seated stepper: 8 min, level 5 resist.  Seated stepper: 8 min, level 5 resist. Seated stepper: 7 min, level 3 resist    SB hamstring curls w/ cues for firm hamstring presses: 20x Hip IR log rolling, end range IR stretch: 1 min each L/R,  R hip Logrolling manl.  Resist w/ IR x 10 w/ hip neutral,  2 x 8 w/ hip flexed  R hip log rolling: manl resis: 2 x 10 neutral, 2 x 8 hooklying Seated hip flexion with mid range holds; 2 x 8 Supine R hip active logrollinx Supine hip active logrollin x 20x    Bridges - legs over physioball : x 20 Hip adductor aarom supine : 10 reps L/R Hip ER manl resist 2 x 10 hooklying R hip hooklying ER manl resist: 2 x 10  Seated hip add squeezes 2 x 6 Supine assisted R SLR's 2 x 8 Hooklying R hip add/IR x 2 x 8    Hooklying : hip IR/adduction : assisted: 10 x each L/R Hip add sukhjinder in hooklying (ball squeeze) 3 x 5\" bilat Alternating R hip isometrics holding in hooklying ER/IR: 2 x 10 Bridges; R hemipelvis manl resist: 2 x 10  Supine logrolling aarom x 1 min, f/b holds in IR: 2 x 8 Seated hip adduction ball squeezes 2 x 10 x 5\" Hooklying R hip abd/ER: 3 x 8 manl resist    Hooklying - R LE logrolling AROM-AAROM x 2 min LTR's - legs over ball: manl resist L/R 2 x 10  SB hamstring curls x 20   Hooklying hip ER 2 x 8 Bridges w/ legs over bolster, manl R pelvis resist. 2 x 10  Assisted R SLR's: 3 x 8     Bridges: 2 x 10 x 5\"  Bridgin x w/ legs over bolster, f/b single leg bridges L/R x 10   Bridge w/ legs over bolster: 2 x 10, manl resist R hemipelvis  Bridges w/ legs over bolster: manl R pelvis resist: 2 x 10     SLR's assisted: 3 x 5 R, 2 x 5 L (leg off of table     Hip adductor squeezes w red ball: 4 x 5 x 5\" holds     Seated marching x 17 L/R     Standing leg swings flex/ext: 20x each L/R     Seated R adductions x 10      Standing marching x 20x     Long stride walkin laps in // bars, f/b 70' w/ single pole          // bars: stride forw/back x 10 L/R        Proprioceptive Activities:                                        Manual Therapy:                              Functional Activities:   15 min 20 min 25 min 25 min 15 min      Sit-stands : 10, f/b 3 x 10 w 8# med ball, elevated seat Sit- stands: 10x, f;b/ 4 x 10 holding 8# medball Sit stands w/ 2\" seat cushion: 8x, f/b half step stride position sit-stands: 3 x 8 L, 2 x 8 R High march steps in // bars: 5 laps Long stride steps in // bars: 4 laps      Tandem walking in // bars 5 laps, 2 and 1 hand support Tandem walking 1 hand support - 4 laps Repeat: 5 laps Long stride steps in // bars 5 laps Long sidesteps in // bars 4 laps      Caroca walking in // bars - 2 hand support : 3 laps Carioca in // bars - 2 laps L/R,  hands on bars support Repeat Fast walking (timed) 2x20' x 8 sets Fast walking 5 x 50'      Long  stride walking  x 60' w/ skipole R Rapid stride walkin' Long stride walking in // bars : 8 laps Carioca in // bars: front and back cross overs x 6 laps Elevated seat sit-stands: 8x, flb 2 x 8 w/ 8# medball      Rapid sride walking x 120'    Forward lunges into pads: 2 x 15 L/R        Treatment/Session Summary:    Treatment Assessment:    A little weaker performance with step/stride training today.  Pt demosntrates ability to walk with more rapid zoe but doesn't carry this over to everyday gait yet. Communication/Consultation:  None today  Equipment provided today:  None  Recommendations/Intent for next treatment session: Next visit will focus on LE strength /coordination with emphasis on RLE reeducation for normal IR /adduction, CKC strength with emphasis  on supine-sit-stand transfers, narrow base /tandem balance, Gait training to increase step length/decrease step width.  .    Total Treatment Billable Duration:  45 minutes  Time In: 0800  Time Out: 0845    Mylene Holden, PT       Charge Capture  }Post Session Pain  PT Visit Info  [facility] Portal  MD Guidelines  Scanned Media  Benefits  MyChart    Future Appointments   Date Time Provider Dot Gusman   9/2/2022 11:15 AM Tona Oakley, APRN - CNP [id number] [facility]   9/7/2022 10:15 AM Mylene Holden, PT [facility]   9/13/2022  8:45 AM Mylene Holden, PT [facility]   9/15/2022  8:45 AM Mylene Holden, PT [facility]   9/19/2022  9:15 AM [id number] INJECTION/CATH [id number] [facility]   9/20/2022  9:30 AM Mylene Holden, PT [facility]   9/22/2022  8:45 AM Mylene Holden, PT [facility]   9/27/2022  9:30 AM Mylene Holden, PT [facility]   9/29/2022  8:45 AM Mylene Holden, PT [facility]   10/24/2022  9:00 AM Giovana Rodriguez MD [id number] [facility]   12/2/2022  8:15 AM TRIM LAB RESOURCE TRIM [facility]   12/9/2022  8:30 AM Aman Alfaro DO TRIM [facility]

## 2022-09-02 NOTE — PROGRESS NOTES
Will add hycosamine for bladder spasms. Needs follow up w Dr. Allan Harding 1-2 months. Continue routine catheter changes.    Rosalia Rod, APRN - CNP

## 2022-09-02 NOTE — PROGRESS NOTES
Pt came into office stating he had leakage out of his cath from penis. Irrigated until cath ran clear. Bag and stat lock was replaced and Pt was advised to push fluids. Cath was running freely when Pt left office.

## 2022-09-06 ENCOUNTER — TELEPHONE (OUTPATIENT)
Dept: INTERNAL MEDICINE CLINIC | Facility: CLINIC | Age: 77
End: 2022-09-06

## 2022-09-06 RX ORDER — CARVEDILOL 6.25 MG/1
6.25 TABLET ORAL 2 TIMES DAILY WITH MEALS
Qty: 180 TABLET | Refills: 3 | Status: SHIPPED | OUTPATIENT
Start: 2022-09-06

## 2022-09-06 NOTE — TELEPHONE ENCOUNTER
----- Message from Annabellalety Antonio sent at 9/6/2022  8:24 AM EDT -----  Subject: Refill Request    QUESTIONS  Name of Medication? carvedilol (COREG) 3.125 MG tablet  Patient-reported dosage and instructions? 4 tabs a day  How many days do you have left? 2  Preferred Pharmacy? [facility] phone number (if available)? [phone]  Additional Information for Provider? takes 4 tabs a day at 3.125 mg, or 2   tabs if 6.25mg  ---------------------------------------------------------------------------  --------------  CALL BACK INFO  What is the best way for the office to contact you? OK to leave message on   voicemail  Preferred Call Back Phone Number? [phone]  ---------------------------------------------------------------------------  --------------  SCRIPT ANSWERS  Relationship to Patient?  Self

## 2022-09-07 ENCOUNTER — HOSPITAL ENCOUNTER (OUTPATIENT)
Dept: PHYSICAL THERAPY | Age: 77
Setting detail: RECURRING SERIES
Discharge: HOME | End: 2022-09-10
Payer: MEDICARE

## 2022-09-07 PROCEDURE — 97110 THERAPEUTIC EXERCISES: CPT

## 2022-09-07 PROCEDURE — 97530 THERAPEUTIC ACTIVITIES: CPT

## 2022-09-07 NOTE — PROGRESS NOTES
Brenna Nolasco  : 1945  Primary: Medicare Part A And B  Secondary: [address] [place]  [address] [facility] [address]  Phone: [phone]  Fax: [phone] Plan Frequency: 2 times a week for 12 weeks    Plan of Care/Certification Expiration Date: 11/10/22      PT Visit Info:  No data recorded   Visit Count:  8   OUTPATIENT PHYSICAL THERAPY:OP NOTE TYPE: Progress Report / Treatment Note 2022       Episode  }Appt Desk             Treatment Diagnosis:  Generalized Muscle Weakness (M62.81)  Difficulty in walking, Not elsewhere classified (R26.2)  Other abnormalities of gait and mobility (R26.89)  Medical/Referring Diagnosis:  Weakness [R53.1]  Referring Physician:  Hanh Mendez DO MD Orders:  PT Eval and Treat   Date of Onset:  Onset Date: 21     Allergies:   Patient has no known allergies. Restrictions/Precautions:  Restrictions/Precautions: Fall Risk  Required Braces or Orthoses?: No  No data recorded   Interventions Planned (Treatment may consist of any combination of the following):    Current Treatment Recommendations: Strengthening; ROM; Balance training; Functional mobility training; Transfer training; Endurance training; Gait training; Stair training; Home exercise program; Therapeutic activities     Subjective Comments:   Pt states that he doesn't feel that he's walking any faster so far. Initial:}    0 /10Post Session:     0   /10  Medications Last Reviewed:  2022  Updated Objective Findings:     Below findings from initial evaluation unless otherwise noted.:  Observation  Posture: R thoracic kyphosis  Gait: wide based, with no assistive device, bilaterally shortened step lengths. TUG test: 24.4 seconds, no assistive device. Pt shuffles during turns.      ROM (L/R in °) AROM(PROM)  Hip flexion  130/125  Hip extension  0/0  Hip ER  32/36  Hip IR   16/10    Knee extension  0/0  Knee flexion   140/140    Ankle DF 5/14        Strength (L/R)   Hip flexion  3-/3+  Hip ABD  4-/4-  Hip extension  4/4  Hip ER  3+/4-  Hip IR   3/3+    Knee extension  4/4  Knee flexion   4/4    Ankle DF   4/4  Ankle PF   4/4  Ankle eversion  4/4  Ankle inversion  4/4      Special Tests  Transfers sit-stand w/ bilateral chair rail assist, supine -sit with supervision, sit-supine with min assist to lift R LE onto table. 9/7/2022: Tool Used: Timed Up and Go (TUG)  Score:  Initial: 24.4 seconds Most Recent: 17 seconds (Date: 9/7/22 )   Interpretation of Score: The test measures, in seconds, the time taken by an individual to stand up from a standard arm chair (seat height 46 cm [18 in], arm height 65 cm [25.6 in]), walk a distance of 3 meters (118 in, approx 10 ft), turn, walk back to the chair and sit down. If the individual takes longer than 14 seconds to complete TUG, this indicates risk for falls. Strength (L/R)   Hip flexion  6.3kg/6.2kg  Hip ABD  13kg/5 kg   Hip ER   2.3kg/3.1kg  Hip IR   2.2kg/2.2kg         Knee extn  17.3kg/9.8kg        Knee flex  6.8kg/ 11.4 kg  Treatment     TREATMENT:   THERAPEUTIC ACTIVITY: ( see below for minutes): Therapeutic activities per grid below to improve mobility, strength, balance and coordination. Required minimal visual, verbal, manual and tactile cues to improve independence and safety with daily activities . THERAPEUTIC EXERCISE: (see below for minutes): Exercises per grid below to improve mobility, strength, balance and coordination. Required minimal verbal and manual cues to promote proper body alignment, promote proper body posture and promote proper body mechanics. Progressed resistance, range, repetitions and complexity of movement as indicated. MANUAL THERAPY: (see below for minutes): Joint mobilization and Soft tissue mobilization was utilized and necessary because of the patient's restricted joint motion, painful spasm, loss of articular motion and restricted motion of soft tissue. MODALITIES: (see below for minutes): to decrease pain   SELF CARE: (see below for minutes): Procedure(s) (per grid) utilized to improve and/or restore self-care/home management as related to dressing, bathing and grooming. Required minimal verbal cueing to facilitate activities of daily living skills and compensatory activities    Goals: (Goals have been discussed and agreed upon with patient.)  Short-Term Functional Goals: Time Frame: 6 weeks  Independent performance of home exercise program (MET)  Improve sit-stand transfers to independent without chair arm assist, supine-sit to independent. (MET)  Improve TUG test score to < 20 seconds (MET)  Discharge Goals: Time Frame: 12 weeks  Improve TUG test score to < 15 seconds. Improve R LE strengths to 4-/5 hip adductors, flexors to allow less fatigue during all transfers and overground gait  Improved standing balance: single leg > 5 seconds, narrow base/tandem to 30\" or better for fall prevention. Date: 22 (visit 1)  (visit 2)  22 (visit 3)  22 (visit 4)  22 (visit 5)  22 (visit 6)  22 (visit 7) 22 (visit 8/PN)   Modalities:                                            Therapeutic Exercise: 23 min 44 min 38 min 24 min 20 min 20 min 30 min 35 min    Lower trunk rotations 3 min L/R (hooklying) Seated stepper 6 min, level 3 Seated stepper: 8 min, level 3 resist.  Seated stepper: 8 min, level 3 resist.  Seated stepper: 8 min, level 5 resist.  Seated stepper: 8 min, level 5 resist. Seated stepper: 7 min, level 3 resist Seated stepper: 8 min, level 4 resist,     SB hamstring curls w/ cues for firm hamstring presses: 20x Hip IR log rolling, end range IR stretch: 1 min each L/R,  R hip Logrolling manl.  Resist w/ IR x 10 w/ hip neutral,  2 x 8 w/ hip flexed  R hip log rolling: manl resis: 2 x 10 neutral, 2 x 8 hooklying Seated hip flexion with mid range holds; 2 x 8 Supine R hip active logrollinx Supine hip active logrollin x 20x Supine hip active logrolling x 20 L/R    Bridges - legs over physioball : x 20 Hip adductor aarom supine : 10 reps L/R Hip ER manl resist 2 x 10 hooklying R hip hooklying ER manl resist: 2 x 10  Seated hip add squeezes 2 x 6 Supine assisted R SLR's 2 x 8 Hooklying R hip add/IR x 2 x 8 Hip IR (manl resist): 2 x 5 L/R    Hooklying : hip IR/adduction : assisted: 10 x each L/R Hip add sukhjinder in hooklying (ball squeeze) 3 x 5\" bilat Alternating R hip isometrics holding in hooklying ER/IR: 2 x 10 Bridges; R hemipelvis manl resist: 2 x 10  Supine logrolling aarom x 1 min, f/b holds in IR: 2 x 8 Seated hip adduction ball squeezes 2 x 10 x 5\" Hooklying R hip abd/ER: 3 x 8 manl resist Bridges w/ adducctor squeeze: 3 x 5 reps w/5\" holds, cues for adduction    Hooklying - R LE logrolling AROM-AAROM x 2 min LTR's - legs over ball: manl resist L/R 2 x 10  SB hamstring curls x 20   Hooklying hip ER 2 x 8 Bridges w/ legs over bolster, manl R pelvis resist. 2 x 10  Assisted R SLR's: 3 x 8 Leg press (double leg): 20x each at 40# and 60#     Bridges: 2 x 10 x 5\"  Bridgin x w/ legs over bolster, f/b single leg bridges L/R x 10   Bridge w/ legs over bolster: 2 x 10, manl resist R hemipelvis  Bridges w/ legs over bolster: manl R pelvis resist: 2 x 10      SLR's assisted: 3 x 5 R, 2 x 5 L (leg off of table     Hip adductor squeezes w red ball: 4 x 5 x 5\" holds      Seated marching x 17 L/R     Standing leg swings flex/ext: 20x each L/R      Seated R adductions x 10      Standing marching x 20x      Long stride walkin laps in // bars, f/b 70' w/ single pole           // bars: stride forw/back x 10 L/R         Proprioceptive Activities:                                            Manual Therapy:                                 Functional Activities:   15 min 20 min 25 min 25 min 15 min 10 min      Sit-stands : 10, f/b 3 x 10 w 8# med ball, elevated seat Sit- stands: 10x, f;b/ 4 x 10 holding 8# medball Sit stands w/ 2\" seat cushion: 8x, f/b half step stride position sit-stands: 3 x 8 L, 2 x 8 R High march steps in // bars: 5 laps Long stride steps in // bars: 4 laps TUG x 3      Tandem walking in // bars 5 laps, 2 and 1 hand support Tandem walking 1 hand support - 4 laps Repeat: 5 laps Long stride steps in // bars 5 laps Long sidesteps in // bars 4 laps Tightrope walking in // bars - 5 laps, 1 hand on rail assist      Caroca walking in // bars - 2 hand support : 3 laps Carioca in // bars - 2 laps L/R,  hands on bars support Repeat Fast walking (timed) 2x20' x 8 sets Fast walking 5 x 50' Carioca walking in // bars - 2 laps      Long  stride walking  x 60' w/ skipole R Rapid stride walkin' Long stride walking in // bars : 8 laps Carioca in // bars: front and back cross overs x 6 laps Elevated seat sit-stands: 8x, flb 2 x 8 w/ 8# medball       Rapid sride walking x 120'    Forward lunges into pads: 2 x 15 L/R         Treatment/Session Summary:    Treatment Assessment:    Short term therapy goals met. Pt has notable deficiencies - left hamstring, left/right adductor and rotator strengths ,with functional deficits attributable to this such as his ability to scoot/slide in bed, walk with a long, rapid stride or walk with a narrow gait base. Therapy continues to be medically necessary to improve pt's gait safety and improve the strength of his transfers. Communication/Consultation:  None today  Equipment provided today:  None  Recommendations/Intent for next treatment session: Next visit will focus on LE strength /coordination with emphasis on RLE reeducation for normal IR /adduction, CKC strength with emphasis  on supine-sit-stand transfers, narrow base /tandem balance, Gait training to increase step length/decrease step width.  .    Total Treatment Billable Duration:  45 minutes       Karen Brizuela, PT       Charge Capture  }Post Session Pain  PT Visit Info  [facility] [employer] Portal  MD Guidelines  Scanned Media  Benefits Aarti    Future Appointments   Date Time Provider Dot Gusman   9/7/2022 10:15 AM Mily White, PT [facility]   9/13/2022  8:45 AM Mily White, PT [facility]   9/15/2022  8:45 AM Mily White, PT [facility]   9/19/2022  9:15 AM [id number] INJECTION/CATH [id number] [facility]   9/20/2022  9:30 AM Mily White, PT [facility]   9/22/2022  8:45 AM Mily White, PT [facility]   9/27/2022  9:30 AM Mily White, PT [facility]   9/29/2022  8:45 AM Mily White, PT [facility]   10/24/2022  9:00 AM Dulce Jones MD [id number] [facility]   12/2/2022  8:15 AM TRIM LAB RESOURCE TRIM [facility]   12/9/2022  8:30 AM Jyoti Crook DO TRIM [facility]

## 2022-09-13 ENCOUNTER — HOSPITAL ENCOUNTER (OUTPATIENT)
Dept: PHYSICAL THERAPY | Age: 77
Setting detail: RECURRING SERIES
Discharge: HOME | End: 2022-09-16
Payer: MEDICARE

## 2022-09-13 PROCEDURE — 99283 EMERGENCY DEPT VISIT LOW MDM: CPT

## 2022-09-13 PROCEDURE — 97530 THERAPEUTIC ACTIVITIES: CPT

## 2022-09-13 PROCEDURE — 51702 INSERT TEMP BLADDER CATH: CPT

## 2022-09-13 PROCEDURE — 97110 THERAPEUTIC EXERCISES: CPT

## 2022-09-13 ASSESSMENT — PAIN - FUNCTIONAL ASSESSMENT: PAIN_FUNCTIONAL_ASSESSMENT: NONE - DENIES PAIN

## 2022-09-13 NOTE — PROGRESS NOTES
Mundo Nolasco  : 1945  Primary: Medicare Part A And B  Secondary: [address] [place]  [address] [facility] [address]  Phone: [phone]  Fax: [phone] Plan Frequency: 2 times a week for 12 weeks    Plan of Care/Certification Expiration Date: 11/10/22      PT Visit Info:  No data recorded   Visit Count:  9   OUTPATIENT PHYSICAL THERAPY:OP NOTE TYPE: Treatment Note 2022       Episode  }Appt Desk             Treatment Diagnosis:  Generalized Muscle Weakness (M62.81)  Difficulty in walking, Not elsewhere classified (R26.2)  Other abnormalities of gait and mobility (R26.89)  Medical/Referring Diagnosis:  Weakness [R53.1]  Referring Physician:  Mike Gooden DO MD Orders:  PT Eval and Treat   Date of Onset:  Onset Date: 21     Allergies:   Patient has no known allergies. Restrictions/Precautions:  Restrictions/Precautions: Fall Risk  Required Braces or Orthoses?: No  No data recorded   Interventions Planned (Treatment may consist of any combination of the following):    Current Treatment Recommendations: Strengthening; ROM; Balance training; Functional mobility training; Transfer training; Endurance training; Gait training; Stair training; Home exercise program; Therapeutic activities     Subjective Comments:   Pt feels that his walking is slower this morning. Initial:}    0 /10Post Session:     0   /10  Medications Last Reviewed:  2022  Updated Objective Findings:     Below findings from initial evaluation unless otherwise noted.:  Observation  Posture: R thoracic kyphosis  Gait: wide based, with no assistive device, bilaterally shortened step lengths. TUG test: 24.4 seconds, no assistive device. Pt shuffles during turns.      ROM (L/R in °) AROM(PROM)  Hip flexion  130/125  Hip extension  0/0  Hip ER  32/36  Hip IR   16/10    Knee extension  0/0  Knee flexion   140/140    Ankle DF   5/14        Strength (L/R)   Hip flexion  3-/3+  Hip ABD  4-/4-  Hip extension  4/4  Hip ER  3+/4-  Hip IR   3/3+    Knee extension  4/4  Knee flexion   4/4    Ankle DF   4/4  Ankle PF   4/4  Ankle eversion  4/4  Ankle inversion  4/4      Special Tests  Transfers sit-stand w/ bilateral chair rail assist, supine -sit with supervision, sit-supine with min assist to lift R LE onto table. 9/7/2022: Tool Used: Timed Up and Go (TUG)  Score:  Initial: 24.4 seconds Most Recent: 17 seconds (Date: 9/7/22 )   Interpretation of Score: The test measures, in seconds, the time taken by an individual to stand up from a standard arm chair (seat height 46 cm [18 in], arm height 65 cm [25.6 in]), walk a distance of 3 meters (118 in, approx 10 ft), turn, walk back to the chair and sit down. If the individual takes longer than 14 seconds to complete TUG, this indicates risk for falls. Strength (L/R)   Hip flexion  6.3kg/6.2kg  Hip ABD  13kg/5 kg   Hip ER   2.3kg/3.1kg  Hip IR   2.2kg/2.2kg         Knee extn  17.3kg/9.8kg        Knee flex  6.8kg/ 11.4 kg  Treatment     TREATMENT:   THERAPEUTIC ACTIVITY: ( see below for minutes): Therapeutic activities per grid below to improve mobility, strength, balance and coordination. Required minimal visual, verbal, manual and tactile cues to improve independence and safety with daily activities . THERAPEUTIC EXERCISE: (see below for minutes): Exercises per grid below to improve mobility, strength, balance and coordination. Required minimal verbal and manual cues to promote proper body alignment, promote proper body posture and promote proper body mechanics. Progressed resistance, range, repetitions and complexity of movement as indicated. MANUAL THERAPY: (see below for minutes): Joint mobilization and Soft tissue mobilization was utilized and necessary because of the patient's restricted joint motion, painful spasm, loss of articular motion and restricted motion of soft tissue.    MODALITIES: (see below for minutes): to decrease pain   SELF CARE: (see below for minutes): Procedure(s) (per grid) utilized to improve and/or restore self-care/home management as related to dressing, bathing and grooming. Required minimal verbal cueing to facilitate activities of daily living skills and compensatory activities    Goals: (Goals have been discussed and agreed upon with patient.)  Short-Term Functional Goals: Time Frame: 6 weeks  Independent performance of home exercise program (MET)  Improve sit-stand transfers to independent without chair arm assist, supine-sit to independent. (MET)  Improve TUG test score to < 20 seconds (MET)  Discharge Goals: Time Frame: 12 weeks  Improve TUG test score to < 15 seconds. Improve R LE strengths to 4-/5 hip adductors, flexors to allow less fatigue during all transfers and overground gait  Improved standing balance: single leg > 5 seconds, narrow base/tandem to 30\" or better for fall prevention. Date: 22 (visit 5)  22 (visit 6)  22 (visit 7) 22 (visit 8/PN) 22 (visit 9)   Modalities:                                Therapeutic Exercise: 20 min 20 min 30 min 35 min 30 min    Seated stepper: 8 min, level 5 resist.  Seated stepper: 8 min, level 5 resist. Seated stepper: 7 min, level 3 resist Seated stepper: 8 min, level 4 resist,  LTR's: legs over ball AROM x 1 min, manl resist L/R x 10     Seated hip flexion with mid range holds; 2 x 8 Supine R hip active logrollinx Supine hip active logrollin x 20x Supine hip active logrolling x 20 L/R Logrollinx L/R    Seated hip add squeezes 2 x 6 Supine assisted R SLR's 2 x 8 Hooklying R hip add/IR x 2 x 8 Hip IR (manl resist): 2 x 5 L/R Hip IR: Log roll manl resist 2 x 5 , hooklying IR 2 x 8 L/R.      Supine logrolling aarom x 1 min, f/b holds in IR: 2 x 8 Seated hip adduction ball squeezes 2 x 10 x 5\" Hooklying R hip abd/ER: 3 x 8 manl resist Bridges w/ adducctor squeeze: 3 x 5 reps w/5\" holds, cues for adduction Bridges w/ add squeezes: 2 x 8     Hooklying hip ER 2 x 8 Bridges w/ legs over bolster, manl R pelvis resist. 2 x 10  Assisted R SLR's: 3 x 8 Leg press (double leg): 20x each at 40# and 60# Leg press DL : 15 each at 60 and 80#    Bridge w/ legs over bolster: 2 x 10, manl resist R hemipelvis  Bridges w/ legs over bolster: manl R pelvis resist: 2 x 10        Hip adductor squeezes w red ball: 4 x 5 x 5\" holds        Standing leg swings flex/ext: 20x each L/R        Standing marching x 20x                     Proprioceptive Activities:                                Manual Therapy:                        Functional Activities: 25 min 25 min 15 min 10 min 15 min    Sit stands w/ 2\" seat cushion: 8x, f/b half step stride position sit-stands: 3 x 8 L, 2 x 8 R High march steps in // bars: 5 laps Long stride steps in // bars: 4 laps TUG x 3 Sit-stand:elevated seat: 3 x 10 w/ 8# ball, cues for weightshift over feet. Repeat: 5 laps Long stride steps in // bars 5 laps Long sidesteps in // bars 4 laps Tightrope walking in // bars - 5 laps, 1 hand on rail assist Tightrope walking in // bars 3 laps, 1 hand on bar    Repeat Fast walking (timed) 2x20' x 8 sets Fast walking 5 x 50' Carioca walking in // bars - 2 laps Carioca walking in // bars - 3 laps - 2 hands on bars. Long stride walking in // bars : 8 laps Carioca in // bars: front and back cross overs x 6 laps Elevated seat sit-stands: 8x, flb 2 x 8 w/ 8# medball  Long stride fast pace walking 3 x 50'     Forward lunges into pads: 2 x 15 L/R          Treatment/Session Summary:    Treatment Assessment:    Overall stride length and gait speed less than goals - pt does respond well to ues to lengthen stride.    Communication/Consultation:  None today  Equipment provided today:  None  Recommendations/Intent for next treatment session: Next visit will focus on LE strength /coordination with emphasis on RLE reeducation for normal IR /adduction, CKC strength with emphasis  on supine-sit-stand transfers, narrow base /tandem balance, Gait training to increase step length/decrease step width.  .    Total Treatment Billable Duration:  45 minutes  Time In: 7408  Time Out: 0932    Magalie Asters, PT       Charge Capture  }Post Session Pain  PT Visit Info  [facility] Portal  MD Guidelines  Scanned Media  Benefits  MyChart    Future Appointments   Date Time Provider Dot Uzma   9/15/2022  8:45 AM Magalie Asters, PT [facility]   9/19/2022  9:15 AM [id number] INJECTION/CATH [id number] [facility] AMB   9/20/2022  9:30 AM Magalie Asters, PT [facility]   9/22/2022  8:45 AM Magalie Asters, PT [facility]   9/27/2022  9:30 AM Magalie Asters, PT [facility]   9/29/2022  8:45 AM Magalie Asters, PT [facility] O   10/24/2022  9:00 AM Tip Monroe MD [id number] [facility] AMB   12/2/2022  8:15 AM TRIM LAB RESOURCE TRIM [facility] AMB   12/9/2022  8:30 AM Christy Richards DO TRIM [facility] AMB

## 2022-09-14 ENCOUNTER — HOSPITAL ENCOUNTER (EMERGENCY)
Age: 77
Discharge: HOME | End: 2022-09-14
Attending: EMERGENCY MEDICINE
Payer: MEDICARE

## 2022-09-14 VITALS
RESPIRATION RATE: 15 BRPM | SYSTOLIC BLOOD PRESSURE: 148 MMHG | HEART RATE: 67 BPM | BODY MASS INDEX: 19.7 KG/M2 | TEMPERATURE: 98.3 F | OXYGEN SATURATION: 98 % | WEIGHT: 130 LBS | DIASTOLIC BLOOD PRESSURE: 90 MMHG | HEIGHT: 68 IN

## 2022-09-14 DIAGNOSIS — T83.9XXA PROBLEM WITH FOLEY CATHETER, INITIAL ENCOUNTER (HCC): Primary | ICD-10-CM

## 2022-09-14 LAB
BILIRUB UR QL: NEGATIVE
GLUCOSE UR QL STRIP.AUTO: NEGATIVE MG/DL
KETONES UR-MCNC: NEGATIVE MG/DL
LEUKOCYTE ESTERASE UR QL STRIP: ABNORMAL
NITRITE UR QL: POSITIVE
PH UR: 5.5 [PH] (ref 5–9)
PROT UR QL: 100 MG/DL
RBC # UR STRIP: ABNORMAL /UL
SERVICE CMNT-IMP: ABNORMAL
SP GR UR: 1.01 (ref 1–1.02)
UROBILINOGEN UR QL: 0.2 EU/DL (ref 0.2–1)

## 2022-09-14 PROCEDURE — 81003 URINALYSIS AUTO W/O SCOPE: CPT

## 2022-09-14 RX ORDER — CEPHALEXIN 500 MG/1
500 CAPSULE ORAL 4 TIMES DAILY
Qty: 28 CAPSULE | Refills: 0 | Status: SHIPPED | OUTPATIENT
Start: 2022-09-14 | End: 2022-09-21

## 2022-09-14 NOTE — ED NOTES
Replaced pts carr catheter following pt stating the catheter came out on its own at  home. Pt initially stated the bulb was still inflated when it self-removed, but then stated the catheter did not have a bulb inflated when it fell out.       Parisa Brandon RN  09/14/22 [id number]

## 2022-09-14 NOTE — ED NOTES
I have reviewed discharge instructions with the patient. The patient verbalized understanding. Patient left ED via Discharge Method: ambulatory to Home with self    Opportunity for questions and clarification provided. Patient given 1 scripts. To continue your aftercare when you leave the hospital, you may receive an automated call from our care team to check in on how you are doing. This is a free service and part of our promise to provide the best care and service to meet your aftercare needs.  If you have questions, or wish to unsubscribe from this service please call [phone]. Thank you for Choosing our [facility] Emergency Department.        Parisa Brandon RN  09/14/22 [id number]

## 2022-09-14 NOTE — ED PROVIDER NOTES
[employer] Emergency Department Provider Note                   PCP:                Ana Rosa Campos DO               Age: 64 y.o. Sex: male       ICD-10-CM    1. Problem with Carr catheter, [facility]  T83. 9XXA           DISPOSITION Decision To Discharge 09/14/2022 02:26:18 AM         Orders Placed This Encounter   Procedures    Insert carr catheter        Liam Regalado is a 64 y.o. male who presents to the Emergency Department with chief complaint of    Chief Complaint   Patient presents with    Urinary Catheter      75-year-old male presents this department with chief complaint of Carr catheter problem. He reports his Carr catheter has fallen out. He has a complicated urologic history and has every 4 weeks Carr placement. This 1 has only been in place for couple weeks. He presents here for catheter placement. He does not self cath. The history is provided by the patient. No  was used. Review of Systems   Constitutional:  Negative for chills and fever. Genitourinary:  Negative for flank pain, frequency, penile discharge, penile pain, penile swelling and testicular pain. All other systems reviewed and are negative.     Past Medical History:   Diagnosis Date    Acute deep vein thrombosis (DVT) of non-extremity vein 5/20/2019    Family history of malignant neoplasm of gastrointestinal tract     mother colon/ovarian cancer 67    Fecal incontinence     LAR syndrome    Carr catheter in place 2022    patient stated- \"catheter due to them nicking his bladder and it won't heal\"    Former cigarette smoker     History of rectal cancer     Hypothyroid     stable w/med    Infection and inflammatory reaction due to other internal prosthetic devices, implants and grafts, subsequent encounter 2017    abd wound/mesh colostomy    Insomnia     takes meds    Major depression     Osteoarthritis, hand     Personal history of colonic polyps 9/2013    x 1    Personal history of SURGICAL HISTORY  10/7/2013    Roberto---endorectal us    POLYPECTOMY      TOTAL COLECTOMY  2013    Roberto--lap LAR with coloproctostomy, mobilization splenic flexure, diverting loop ileostomy    TOTAL COLECTOMY Right 2014    for leak    VASCULAR SURGERY Left     single lumen port/subsequently removed        Family History   Problem Relation Age of Onset    Cancer Mother         colon and ovarian    Cancer Brother         prostate    Alcohol Abuse Brother     Cancer Maternal Grandmother         bone    Alcohol Abuse Father     Stroke Paternal Grandfather     Alcohol Abuse Sister         Social History     Socioeconomic History    Marital status: Single     Spouse name: None    Number of children: None    Years of education: None    Highest education level: None   Tobacco Use    Smoking status: Former     Packs/day: 1.00     Types: Cigarettes     Quit date: 1963     Years since quittin.8    Smokeless tobacco: Never   Substance and Sexual Activity    Alcohol use: Not Currently     Alcohol/week: 11.7 standard drinks    Drug use: No         Patient has no known allergies. Previous Medications    ACETAMINOPHEN (TYLENOL) 500 MG TABLET    Take 500 mg by mouth every 6 hours as needed for Pain    AMITRIPTYLINE (ELAVIL) 100 MG TABLET    Take 100 mg by mouth nightly     CARVEDILOL (COREG) 6.25 MG TABLET    Take 1 tablet by mouth 2 times daily (with meals)    CLONAZEPAM (KLONOPIN) 2 MG TABLET    Take 2 tablets by mouth nightly as needed for Anxiety for up to 3 days. CLONAZEPAM (KLONOPIN) 2 MG TABLET    Take 6 mg by mouth nightly as needed (sleep).     FAMOTIDINE (PEPCID) 10 MG TABLET    Take 1 tablet by mouth daily    HYOSCYAMINE SULFATE SL (LEVSIN/SL) 0.125 MG SUBL    Place 1 tablet under the tongue every 6 hours as needed (bladder spasm)    IBUPROFEN (ADVIL;MOTRIN) 600 MG TABLET    Take 600 mg by mouth every 6 hours as needed for Pain    LEVOTHYROXINE (SYNTHROID) 125 MCG TABLET    TAKE ONE TABLET BY MOUTH ONE TIME DAILY BEFORE BREAKFAST    OLANZAPINE (ZYPREXA) 2.5 MG TABLET    Take 7.5 mg by mouth nightly    OXYBUTYNIN (DITROPAN-XL) 10 MG EXTENDED RELEASE TABLET    Take 10 mg by mouth daily         Vitals signs and nursing note reviewed. No data found. Physical Exam  Vitals and nursing note reviewed. Constitutional:       General: He is not in acute distress. Appearance: Normal appearance. He is normal weight. He is not ill-appearing, toxic-appearing or diaphoretic. HENT:      Head: Normocephalic and atraumatic. Mouth/Throat:      Mouth: Mucous membranes are moist.   Eyes:      Pupils: Pupils are equal, round, and reactive to light. Cardiovascular:      Rate and Rhythm: Normal rate. Pulmonary:      Effort: Pulmonary effort is normal.   Abdominal:      General: Abdomen is flat. Palpations: Abdomen is soft. Skin:     General: Skin is warm. Neurological:      General: No focal deficit present. Mental Status: He is alert. Psychiatric:         Mood and Affect: Mood normal.        MDM  Number of Diagnoses or Management Options  Problem with John catheter, initial encounter [facility]): minor  Diagnosis management comments: 20 Maltese coudé placed by nurse. Patient tolerated without complication. Patient be discharged home at this time. Risk of Complications, Morbidity, and/or Mortality  Presenting problems: low  Diagnostic procedures: low  Management options: low        Procedures      Labs Reviewed - No data to display     No orders to display                          Voice dictation software was used during the making of this note. This software is not perfect and grammatical and other typographical errors may be present. This note has not been completely proofread for errors.      Jamison Vallema  09/14/22 [id number]

## 2022-09-15 ENCOUNTER — HOSPITAL ENCOUNTER (OUTPATIENT)
Dept: PHYSICAL THERAPY | Age: 77
Setting detail: RECURRING SERIES
Discharge: HOME | End: 2022-09-18
Payer: MEDICARE

## 2022-09-15 PROCEDURE — 97110 THERAPEUTIC EXERCISES: CPT

## 2022-09-15 PROCEDURE — 97530 THERAPEUTIC ACTIVITIES: CPT

## 2022-09-15 NOTE — PROGRESS NOTES
Myke Garciain  : 1945  Primary: Medicare Part A And B  Secondary: [address] [place]  [address] [facility] [address]  Phone: [phone]  Fax: [phone] Plan Frequency: 2 times a week for 12 weeks    Plan of Care/Certification Expiration Date: 11/10/22      PT Visit Info:  No data recorded   Visit Count:  10   OUTPATIENT PHYSICAL CZBJGBH:WD NOTE TYPE: Treatment Note 9/15/2022       Episode  }Appt Desk             Treatment Diagnosis:  Generalized Muscle Weakness (M62.81)  Difficulty in walking, Not elsewhere classified (R26.2)  Other abnormalities of gait and mobility (R26.89)  Medical/Referring Diagnosis:  Weakness [R53.1]  Referring Physician:  Aysha Ruiz DO MD Orders:  PT Eval and Treat   Date of Onset:  Onset Date: 21     Allergies:   Patient has no known allergies. Restrictions/Precautions:  Restrictions/Precautions: Fall Risk  Required Braces or Orthoses?: No  No data recorded   Interventions Planned (Treatment may consist of any combination of the following):    Current Treatment Recommendations: Strengthening; ROM; Balance training; Functional mobility training; Transfer training; Endurance training; Gait training; Stair training; Home exercise program; Therapeutic activities     Subjective Comments:   Pt fatigued - was in the ER the night before due to urinary catheter problems. Initial:}    0 /10Post Session:     0   /10  Medications Last Reviewed:  9/15/2022  Updated Objective Findings:     Below findings from initial evaluation unless otherwise noted.:  Observation  Posture: R thoracic kyphosis  Gait: wide based, with no assistive device, bilaterally shortened step lengths. TUG test: 24.4 seconds, no assistive device. Pt shuffles during turns.      ROM (L/R in °) AROM(PROM)  Hip flexion  130/125  Hip extension  0/0  Hip ER  32/36  Hip IR   16/10    Knee extension  0/0  Knee flexion   140/140    Ankle DF 5/14        Strength (L/R)   Hip flexion  3-/3+  Hip ABD  4-/4-  Hip extension  4/4  Hip ER  3+/4-  Hip IR   3/3+    Knee extension  4/4  Knee flexion   4/4    Ankle DF   4/4  Ankle PF   4/4  Ankle eversion  4/4  Ankle inversion  4/4      Special Tests  Transfers sit-stand w/ bilateral chair rail assist, supine -sit with supervision, sit-supine with min assist to lift R LE onto table. 9/7/2022: Tool Used: Timed Up and Go (TUG)  Score:  Initial: 24.4 seconds Most Recent: 17 seconds (Date: 9/7/22 )   Interpretation of Score: The test measures, in seconds, the time taken by an individual to stand up from a standard arm chair (seat height 46 cm [18 in], arm height 65 cm [25.6 in]), walk a distance of 3 meters (118 in, approx 10 ft), turn, walk back to the chair and sit down. If the individual takes longer than 14 seconds to complete TUG, this indicates risk for falls. Strength (L/R)   Hip flexion  6.3kg/6.2kg  Hip ABD  13kg/5 kg   Hip ER   2.3kg/3.1kg  Hip IR   2.2kg/2.2kg         Knee extn  17.3kg/9.8kg        Knee flex  6.8kg/ 11.4 kg  Treatment     TREATMENT:   THERAPEUTIC ACTIVITY: ( see below for minutes): Therapeutic activities per grid below to improve mobility, strength, balance and coordination. Required minimal visual, verbal, manual and tactile cues to improve independence and safety with daily activities . THERAPEUTIC EXERCISE: (see below for minutes): Exercises per grid below to improve mobility, strength, balance and coordination. Required minimal verbal and manual cues to promote proper body alignment, promote proper body posture and promote proper body mechanics. Progressed resistance, range, repetitions and complexity of movement as indicated. MANUAL THERAPY: (see below for minutes): Joint mobilization and Soft tissue mobilization was utilized and necessary because of the patient's restricted joint motion, painful spasm, loss of articular motion and restricted motion of soft tissue. MODALITIES: (see below for minutes): to decrease pain   SELF CARE: (see below for minutes): Procedure(s) (per grid) utilized to improve and/or restore self-care/home management as related to dressing, bathing and grooming. Required minimal verbal cueing to facilitate activities of daily living skills and compensatory activities    Goals: (Goals have been discussed and agreed upon with patient.)  Short-Term Functional Goals: Time Frame: 6 weeks  Independent performance of home exercise program (MET)  Improve sit-stand transfers to independent without chair arm assist, supine-sit to independent. (MET)  Improve TUG test score to < 20 seconds (MET)  Discharge Goals: Time Frame: 12 weeks  Improve TUG test score to < 15 seconds. Improve R LE strengths to 4-/5 hip adductors, flexors to allow less fatigue during all transfers and overground gait  Improved standing balance: single leg > 5 seconds, narrow base/tandem to 30\" or better for fall prevention. Date: 22 (visit 5)  22 (visit 6)  22 (visit 7) 22 (visit 8/PN) 22 (visit 9) 9/15/22 (visit 10)   Modalities:                                    Therapeutic Exercise: 20 min 20 min 30 min 35 min 30 min 30 min    Seated stepper: 8 min, level 5 resist.  Seated stepper: 8 min, level 5 resist. Seated stepper: 7 min, level 3 resist Seated stepper: 8 min, level 4 resist,  LTR's: legs over ball AROM x 1 min, manl resist L/R x 10  Seated stepper x 8 min, level 4 resist    Seated hip flexion with mid range holds; 2 x 8 Supine R hip active logrollinx Supine hip active logrollin x 20x Supine hip active logrolling x 20 L/R Logrollinx L/R R leg logroll stretch 10x    Seated hip add squeezes 2 x 6 Supine assisted R SLR's 2 x 8 Hooklying R hip add/IR x 2 x 8 Hip IR (manl resist): 2 x 5 L/R Hip IR: Log roll manl resist 2 x 5 , hooklying IR 2 x 8 L/R.   Hooklying hip alternating ER/IR; 2 x 8 L/R    Supine logrolling aarom x 1 min, f/b holds in IR: 2 x 8 Seated hip adduction ball squeezes 2 x 10 x 5\" Hooklying R hip abd/ER: 3 x 8 manl resist Bridges w/ adducctor squeeze: 3 x 5 reps w/5\" holds, cues for adduction Bridges w/ add squeezes: 2 x 8  Bridges: knees over bolser x 10, calves over bolster x 10    Hooklying hip ER 2 x 8 Bridges w/ legs over bolster, manl R pelvis resist. 2 x 10  Assisted R SLR's: 3 x 8 Leg press (double leg): 20x each at 40# and 60# Leg press DL : 15 each at 60 and 80# Bilat hip logroll IR -manl resist: x 10     Bridge w/ legs over bolster: 2 x 10, manl resist R hemipelvis  Bridges w/ legs over bolster: manl R pelvis resist: 2 x 10   Leg press: 60# : 3 x 8       Hip adductor squeezes w red ball: 4 x 5 x 5\" holds         Standing leg swings flex/ext: 20x each L/R         Standing marching x 20x                        Proprioceptive Activities:                                    Manual Therapy:                           Functional Activities: 25 min 25 min 15 min 10 min 15 min 14 min    Sit stands w/ 2\" seat cushion: 8x, f/b half step stride position sit-stands: 3 x 8 L, 2 x 8 R High march steps in // bars: 5 laps Long stride steps in // bars: 4 laps TUG x 3 Sit-stand:elevated seat: 3 x 10 w/ 8# ball, cues for weightshift over feet. Sit-stand w/ elevated seat, 6# med ball and manl cues for weightshift. 4 x 8    Repeat: 5 laps Long stride steps in // bars 5 laps Long sidesteps in // bars 4 laps Tightrope walking in // bars - 5 laps, 1 hand on rail assist Tightrope walking in // bars 3 laps, 1 hand on bar Lateral wide stepping w/ hands on rail: 3 laps L/R at // bars    Repeat Fast walking (timed) 2x20' x 8 sets Fast walking 5 x 50' Carioca walking in // bars - 2 laps Carioca walking in // bars - 3 laps - 2 hands on bars.   Front/ back long stride steps x 10 L/R    Long stride walking in // bars : 8 laps Carioca in // bars: front and back cross overs x 6 laps Elevated seat sit-stands: 8x, flb 2 x 8 w/ 8# medball  Long stride fast pace walking 3 x

## 2022-09-19 ENCOUNTER — CARE COORDINATION (OUTPATIENT)
Dept: CARE COORDINATION | Facility: CLINIC | Age: 77
End: 2022-09-19

## 2022-09-20 ENCOUNTER — HOSPITAL ENCOUNTER (OUTPATIENT)
Dept: PHYSICAL THERAPY | Age: 77
Setting detail: RECURRING SERIES
Discharge: HOME | End: 2022-09-23
Payer: MEDICARE

## 2022-09-20 PROCEDURE — 97530 THERAPEUTIC ACTIVITIES: CPT

## 2022-09-20 PROCEDURE — 97110 THERAPEUTIC EXERCISES: CPT

## 2022-09-20 NOTE — PROGRESS NOTES
Leonor Osgood Comin  : 1945  Primary: Medicare Part A And B  Secondary: [address] [place]  [address] [facility] [address]  Phone: [phone]  Fax: [phone] Plan Frequency: 2 times a week for 12 weeks    Plan of Care/Certification Expiration Date: 11/10/22      PT Visit Info:  No data recorded   Visit Count:  11   OUTPATIENT PHYSICAL [id number] NOTE TYPE: Treatment Note 2022       Episode  }Appt Desk             Treatment Diagnosis:  Generalized Muscle Weakness (M62.81)  Difficulty in walking, Not elsewhere classified (R26.2)  Other abnormalities of gait and mobility (R26.89)  Medical/Referring Diagnosis:  Weakness [R53.1]  Referring Physician:  Kang Garland DO MD Orders:  PT Eval and Treat   Date of Onset:  Onset Date: 21     Allergies:   Patient has no known allergies. Restrictions/Precautions:  Restrictions/Precautions: Fall Risk  Required Braces or Orthoses?: No  No data recorded   Interventions Planned (Treatment may consist of any combination of the following):    Current Treatment Recommendations: Strengthening; ROM; Balance training; Functional mobility training; Transfer training; Endurance training; Gait training; Stair training; Home exercise program; Therapeutic activities     Subjective Comments:   Pt  feels like he nees to see a neurologist.   Initial:}    0 /10Post Session:     0   /10  Medications Last Reviewed:  2022  Updated Objective Findings:     Below findings from initial evaluation unless otherwise noted.:  Observation  Posture: R thoracic kyphosis  Gait: wide based, with no assistive device, bilaterally shortened step lengths. TUG test: 24.4 seconds, no assistive device. Pt shuffles during turns.      ROM (L/R in °) AROM(PROM)  Hip flexion  130/125  Hip extension  0/0  Hip ER  32/36  Hip IR   16/10    Knee extension  0/0  Knee flexion   140/140    Ankle DF   5/14        Strength (L/R)   Hip flexion  3-/3+  Hip ABD  4-/4-  Hip extension  4/4  Hip ER  3+/4-  Hip IR   3/3+    Knee extension  4/4  Knee flexion   4/4    Ankle DF   4/4  Ankle PF   4/4  Ankle eversion  4/4  Ankle inversion  4/4      Special Tests  Transfers sit-stand w/ bilateral chair rail assist, supine -sit with supervision, sit-supine with min assist to lift R LE onto table. 9/7/2022: Tool Used: Timed Up and Go (TUG)  Score:  Initial: 24.4 seconds Most Recent: 17 seconds (Date: 9/7/22 )   Interpretation of Score: The test measures, in seconds, the time taken by an individual to stand up from a standard arm chair (seat height 46 cm [18 in], arm height 65 cm [25.6 in]), walk a distance of 3 meters (118 in, approx 10 ft), turn, walk back to the chair and sit down. If the individual takes longer than 14 seconds to complete TUG, this indicates risk for falls. Strength (L/R)   Hip flexion  6.3kg/6.2kg  Hip ABD  13kg/5 kg   Hip ER   2.3kg/3.1kg  Hip IR   2.2kg/2.2kg         Knee extn  17.3kg/9.8kg        Knee flex  6.8kg/ 11.4 kg  Treatment     TREATMENT:   THERAPEUTIC ACTIVITY: ( see below for minutes): Therapeutic activities per grid below to improve mobility, strength, balance and coordination. Required minimal visual, verbal, manual and tactile cues to improve independence and safety with daily activities . THERAPEUTIC EXERCISE: (see below for minutes): Exercises per grid below to improve mobility, strength, balance and coordination. Required minimal verbal and manual cues to promote proper body alignment, promote proper body posture and promote proper body mechanics. Progressed resistance, range, repetitions and complexity of movement as indicated. MANUAL THERAPY: (see below for minutes): Joint mobilization and Soft tissue mobilization was utilized and necessary because of the patient's restricted joint motion, painful spasm, loss of articular motion and restricted motion of soft tissue.    MODALITIES: (see below for minutes): to decrease pain   SELF CARE: (see below for minutes): Procedure(s) (per grid) utilized to improve and/or restore self-care/home management as related to dressing, bathing and grooming. Required minimal verbal cueing to facilitate activities of daily living skills and compensatory activities    Goals: (Goals have been discussed and agreed upon with patient.)  Short-Term Functional Goals: Time Frame: 6 weeks  Independent performance of home exercise program (MET)  Improve sit-stand transfers to independent without chair arm assist, supine-sit to independent. (MET)  Improve TUG test score to < 20 seconds (MET)  Discharge Goals: Time Frame: 12 weeks  Improve TUG test score to < 15 seconds. Improve R LE strengths to 4-/5 hip adductors, flexors to allow less fatigue during all transfers and overground gait  Improved standing balance: single leg > 5 seconds, narrow base/tandem to 30\" or better for fall prevention. Date: 22 (visit 5)  22 (visit 6)  22 (visit 7) 22 (visit 8/PN) 22 (visit 9) 9/15/22 (visit 10) 22 (visit 11)   Modalities:                                        Therapeutic Exercise: 20 min 20 min 30 min 35 min 30 min 30 min 36 min    Seated stepper: 8 min, level 5 resist.  Seated stepper: 8 min, level 5 resist. Seated stepper: 7 min, level 3 resist Seated stepper: 8 min, level 4 resist,  LTR's: legs over ball AROM x 1 min, manl resist L/R x 10  Seated stepper x 8 min, level 4 resist Repeat @ level 5 resist, 8 min    Seated hip flexion with mid range holds; 2 x 8 Supine R hip active logrollinx Supine hip active logrollin x 20x Supine hip active logrolling x 20 L/R Logrollinx L/R R leg logroll stretch 10x Log roll stretch L /R 3 x 10 \"    Seated hip add squeezes 2 x 6 Supine assisted R SLR's 2 x 8 Hooklying R hip add/IR x 2 x 8 Hip IR (manl resist): 2 x 5 L/R Hip IR: Log roll manl resist 2 x 5 , hooklying IR 2 x 8 L/R.   Hooklying hip alternating ER/IR; 2 x 8 L/R Manl resist logroll IR\"s L/R 2 x 15    Supine logrolling aarom x 1 min, f/b holds in IR: 2 x 8 Seated hip adduction ball squeezes 2 x 10 x 5\" Hooklying R hip abd/ER: 3 x 8 manl resist Bridges w/ adducctor squeeze: 3 x 5 reps w/5\" holds, cues for adduction Bridges w/ add squeezes: 2 x 8  Bridges: knees over bolser x 10, calves over bolster x 10 SLR's: L 3 x 8, R 3 x 6 assisted    Hooklying hip ER 2 x 8 Bridges w/ legs over bolster, manl R pelvis resist. 2 x 10  Assisted R SLR's: 3 x 8 Leg press (double leg): 20x each at 40# and 60# Leg press DL : 15 each at 60 and 80# Bilat hip logroll IR -manl resist: x 10  Bridges: 10 x w/ bball squeeze: 10x w verbal cues for hip adduction    Bridge w/ legs over bolster: 2 x 10, manl resist R hemipelvis  Bridges w/ legs over bolster: manl R pelvis resist: 2 x 10   Leg press: 60# : 3 x 8  Leg press: 4 x 10 : 65#, 70#, 80#, 100#       Hip adductor squeezes w red ball: 4 x 5 x 5\" holds          Standing leg swings flex/ext: 20x each L/R          Standing marching x 20x                           Proprioceptive Activities:                                        Manual Therapy:                              Functional Activities: 25 min 25 min 15 min 10 min 15 min 14 min 8 min    Sit stands w/ 2\" seat cushion: 8x, f/b half step stride position sit-stands: 3 x 8 L, 2 x 8 R High march steps in // bars: 5 laps Long stride steps in // bars: 4 laps TUG x 3 Sit-stand:elevated seat: 3 x 10 w/ 8# ball, cues for weightshift over feet. Sit-stand w/ elevated seat, 6# med ball and manl cues for weightshift.  4 x 8 Long strides in // bars x 3 laps 1 hand asisst    Repeat: 5 laps Long stride steps in // bars 5 laps Long sidesteps in // bars 4 laps Tightrope walking in // bars - 5 laps, 1 hand on rail assist Tightrope walking in // bars 3 laps, 1 hand on bar Lateral wide stepping w/ hands on rail: 3 laps L/R at // bars Fast stepping (no assistive device)300'    Repeat Fast walking (timed) 2x20' x 8 sets Fast walking 5 x 50' Carioca walking in // bars - 2 laps Carioca walking in // bars - 3 laps - 2 hands on bars. Front/ back long stride steps x 10 L/R     Long stride walking in // bars : 8 laps Carioca in // bars: front and back cross overs x 6 laps Elevated seat sit-stands: 8x, flb 2 x 8 w/ 8# medball  Long stride fast pace walking 3 x 50' Long stride walking x 50'      Forward lunges into pads: 2 x 15 L/R            Treatment/Session Summary:    Treatment Assessment:    Pt was able to advance leg press resistances - fatigues at 100#. We discussed his gait, recommended that he concentrate on walking at faster walking paces at all times. Communication/Consultation:  None today  Equipment provided today:  None  Recommendations/Intent for next treatment session: Next visit will focus on LE strength /coordination with emphasis on RLE reeducation for normal IR /adduction, CKC strength with emphasis  on supine-sit-stand transfers, narrow base /tandem balance, Gait training to increase step length/decrease step width.  .    Total Treatment Billable Duration:  44 minutes  Time In: 0930  Time Out: [address] Kylemary carmen, PT       Charge Capture  }Post Session Pain  PT Visit Info  [employer] Portal  MD Guidelines  Scanned Media  Benefits  MyChart    Future Appointments   Date Time Provider Dot Gusman   9/22/2022  8:45 AM Noemy Cornejo PT [facility]   9/27/2022  9:30 AM Noemy Cornejo PT [facility]   9/29/2022  8:45 AM Noemy Cornejo PT [facility]   10/10/2022  9:30 AM [id number] INJECTION/CATH [id number] [facility]   10/24/2022  9:00 AM Valarie Mathis MD [id number] [facility]   12/2/2022  8:15 AM TRIM LAB RESOURCE TRIM [facility]   12/9/2022  8:30 AM Lincoln Elizabeth DO TRIM [facility]

## 2022-09-21 ENCOUNTER — TELEPHONE (OUTPATIENT)
Dept: INTERNAL MEDICINE CLINIC | Facility: CLINIC | Age: 77
End: 2022-09-21

## 2022-09-21 NOTE — TELEPHONE ENCOUNTER
----- Message from Rubens Potter sent at 9/20/2022 12:33 PM EDT -----  Subject: Message to Provider    QUESTIONS  Information for Provider? Patient is being seen for physical therapy and   said he needs a referral to see another doctor that he recommended but was   told to call primary care for assistance with it. Patient is confused on   what he needs and would like a call. Please advise.   ---------------------------------------------------------------------------  --------------  Marimar MATHEWS  [phone]; OK to leave message on voicemail  ---------------------------------------------------------------------------  --------------  SCRIPT ANSWERS  Relationship to Patient?  Self

## 2022-09-21 NOTE — TELEPHONE ENCOUNTER
I tried to call the patient it went to voice mail. I read the last note from PT. I don't see anything about a referral. I asked the patient to find out what type referral or have PT to call us.

## 2022-09-22 ENCOUNTER — HOSPITAL ENCOUNTER (OUTPATIENT)
Dept: PHYSICAL THERAPY | Age: 77
Setting detail: RECURRING SERIES
Discharge: HOME | End: 2022-09-25
Payer: MEDICARE

## 2022-09-22 PROCEDURE — 97530 THERAPEUTIC ACTIVITIES: CPT

## 2022-09-22 PROCEDURE — 97110 THERAPEUTIC EXERCISES: CPT

## 2022-09-22 NOTE — PROGRESS NOTES
Tan Nolasco  : 1945  Primary: Medicare Part A And B  Secondary: [address] [place]  [address] [facility] [address]  Phone: [phone]  Fax: [phone] Plan Frequency: 2 times a week for 12 weeks    Plan of Care/Certification Expiration Date: 11/10/22      PT Visit Info:  No data recorded   Visit Count:  12   OUTPATIENT PHYSICAL THERAPY:OP NOTE TYPE: Treatment Note 2022       Episode  }Appt Desk             Treatment Diagnosis:  Generalized Muscle Weakness (M62.81)  Difficulty in walking, Not elsewhere classified (R26.2)  Other abnormalities of gait and mobility (R26.89)  Medical/Referring Diagnosis:  Weakness [R53.1]  Referring Physician:  Sam Mratin DO MD Orders:  PT Eval and Treat   Date of Onset:  Onset Date: 21     Allergies:   Patient has no known allergies. Restrictions/Precautions:  Restrictions/Precautions: Fall Risk  Required Braces or Orthoses?: No  No data recorded   Interventions Planned (Treatment may consist of any combination of the following):    Current Treatment Recommendations: Strengthening; ROM; Balance training; Functional mobility training; Transfer training; Endurance training; Gait training; Stair training; Home exercise program; Therapeutic activities     Subjective Comments:   Legs moving slower today. Initial:}    0 /10Post Session:     0   /10  Medications Last Reviewed:  2022  Updated Objective Findings:     Below findings from initial evaluation unless otherwise noted.:  Observation  Posture: R thoracic kyphosis  Gait: wide based, with no assistive device, bilaterally shortened step lengths. TUG test: 24.4 seconds, no assistive device. Pt shuffles during turns.      ROM (L/R in °) AROM(PROM)  Hip flexion  130/125  Hip extension  0/0  Hip ER  32/36  Hip IR   16/10    Knee extension  0/0  Knee flexion   140/140    Ankle DF   5/14        Strength (L/R)   Hip flexion  3-/3+  Hip ABD 4-/4-  Hip extension  4/4  Hip ER  3+/4-  Hip IR   3/3+    Knee extension  4/4  Knee flexion   4/4    Ankle DF   4/4  Ankle PF   4/4  Ankle eversion  4/4  Ankle inversion  4/4      Special Tests  Transfers sit-stand w/ bilateral chair rail assist, supine -sit with supervision, sit-supine with min assist to lift R LE onto table. 9/7/2022: Tool Used: Timed Up and Go (TUG)  Score:  Initial: 24.4 seconds Most Recent: 17 seconds (Date: 9/7/22 )   Interpretation of Score: The test measures, in seconds, the time taken by an individual to stand up from a standard arm chair (seat height 46 cm [18 in], arm height 65 cm [25.6 in]), walk a distance of 3 meters (118 in, approx 10 ft), turn, walk back to the chair and sit down. If the individual takes longer than 14 seconds to complete TUG, this indicates risk for falls. Strength (L/R)   Hip flexion  6.3kg/6.2kg  Hip ABD  13kg/5 kg   Hip ER   2.3kg/3.1kg  Hip IR   2.2kg/2.2kg         Knee extn  17.3kg/9.8kg        Knee flex  6.8kg/ 11.4 kg  Treatment     TREATMENT:   THERAPEUTIC ACTIVITY: ( see below for minutes): Therapeutic activities per grid below to improve mobility, strength, balance and coordination. Required minimal visual, verbal, manual and tactile cues to improve independence and safety with daily activities . THERAPEUTIC EXERCISE: (see below for minutes): Exercises per grid below to improve mobility, strength, balance and coordination. Required minimal verbal and manual cues to promote proper body alignment, promote proper body posture and promote proper body mechanics. Progressed resistance, range, repetitions and complexity of movement as indicated. MANUAL THERAPY: (see below for minutes): Joint mobilization and Soft tissue mobilization was utilized and necessary because of the patient's restricted joint motion, painful spasm, loss of articular motion and restricted motion of soft tissue.    MODALITIES: (see below for minutes): to decrease pain SELF CARE: (see below for minutes): Procedure(s) (per grid) utilized to improve and/or restore self-care/home management as related to dressing, bathing and grooming. Required minimal verbal cueing to facilitate activities of daily living skills and compensatory activities    Goals: (Goals have been discussed and agreed upon with patient.)  Short-Term Functional Goals: Time Frame: 6 weeks  Independent performance of home exercise program (MET)  Improve sit-stand transfers to independent without chair arm assist, supine-sit to independent. (MET)  Improve TUG test score to < 20 seconds (MET)  Discharge Goals: Time Frame: 12 weeks  Improve TUG test score to < 15 seconds. Improve R LE strengths to 4-/5 hip adductors, flexors to allow less fatigue during all transfers and overground gait  Improved standing balance: single leg > 5 seconds, narrow base/tandem to 30\" or better for fall prevention. Date: 22 (visit 7) 22 (visit 8/PN) 22 (visit 9) 9/15/22 (visit 10) 22 (visit 11) 22 (visit 12)    Modalities:                                    Therapeutic Exercise: 30 min 35 min 30 min 30 min 36 min 30 min    Seated stepper: 7 min, level 3 resist Seated stepper: 8 min, level 4 resist,  LTR's: legs over ball AROM x 1 min, manl resist L/R x 10  Seated stepper x 8 min, level 4 resist Repeat @ level 5 resist, 8 min     Supine hip active logrollin x 20x Supine hip active logrolling x 20 L/R Logrollinx L/R R leg logroll stretch 10x Log roll stretch L /R 3 x 10 \" Log roll stretch: 4 x 10\"     Hooklying R hip add/IR x 2 x 8 Hip IR (manl resist): 2 x 5 L/R Hip IR: Log roll manl resist 2 x 5 , hooklying IR 2 x 8 L/R.   Hooklying hip alternating ER/IR; 2 x 8 L/R Manl resist logroll IR\"s L/R 2 x 15 Manl reiis t logrolls 2 x 10 L/R    Hooklying R hip abd/ER: 3 x 8 manl resist Bridges w/ adducctor squeeze: 3 x 5 reps w/5\" holds, cues for adduction Bridges w/ add squeezes: 2 x 8  Bridges: knees over bolser x 10, calves over bolster x 10 SLR's: L 3 x 8, R 3 x 6 assisted SLR's 2 x 8 L/R (*R side =- assisted)     Assisted R SLR's: 3 x 8 Leg press (double leg): 20x each at 40# and 60# Leg press DL : 15 each at 60 and 80# Bilat hip logroll IR -manl resist: x 10  Bridges: 10 x w/ bball squeeze: 10x w verbal cues for hip adduction Bridges: 3 x 8 w adductor ball squeezes. Bridges w/ legs over bolster: manl R pelvis resist: 2 x 10   Leg press: 60# : 3 x 8  Leg press: 4 x 10 : 65#, 70#, 80#, 100#  Leg press: 3 x 8 @ 85#,     Hip adductor squeezes w red ball: 4 x 5 x 5\" holds         Standing leg swings flex/ext: 20x each L/R         Standing marching x 20x                          Proprioceptive Activities:                                    Manual Therapy:                           Functional Activities: 15 min 10 min 15 min 14 min 8 min 14 min    Long stride steps in // bars: 4 laps TUG x 3 Sit-stand:elevated seat: 3 x 10 w/ 8# ball, cues for weightshift over feet. Sit-stand w/ elevated seat, 6# med ball and manl cues for weightshift. 4 x 8 Long strides in // bars x 3 laps 1 hand asisst Stride weight shifts with 2, 1 and no has assist: 5 min    Long sidesteps in // bars 4 laps Tightrope walking in // bars - 5 laps, 1 hand on rail assist Tightrope walking in // bars 3 laps, 1 hand on bar Lateral wide stepping w/ hands on rail: 3 laps L/R at // bars Fast stepping (no assistive device)300' Stride step drills x 15 reps L/R    Fast walking 5 x 50' Carioca walking in // bars - 2 laps Carioca walking in // bars - 3 laps - 2 hands on bars.   Front/ back long stride steps x 10 L/R  Long sttride walking x 240'    Elevated seat sit-stands: 8x, flb 2 x 8 w/ 8# medball  Long stride fast pace walking 3 x 50' Long stride walking x 50'  Fast pace walking x 120'                Treatment/Session Summary:    Treatment Assessment:    Limited changes in step length in last few visits - more work today on training stride lengthening with weight shift, pt still tends to use a short step length afterwards. Communication/Consultation:  None today  Equipment provided today:  None  Recommendations/Intent for next treatment session: Next visit will focus on LE strength /coordination with emphasis on RLE reeducation for normal IR /adduction, CKC strength with emphasis  on supine-sit-stand transfers, narrow base /tandem balance, Gait training to increase step length/decrease step width.  .    Total Treatment Billable Duration:  44 minutes  Time In: 6093  Time Out: 0932    Ashlyn Le, PT       Charge Capture  }Post Session Pain  PT Visit Info  [employer] Portal  MD Guidelines  Scanned Media  Benefits  MyChart    Future Appointments   Date Time Provider Dot Gusman   9/27/2022  9:30 AM Ashlyn Le, PT [facility]   9/29/2022  8:45 AM Ashlyn Le, PT [facility]   10/4/2022  8:45 AM Ashlyn Le, PT [facility]   10/6/2022  8:45 AM Ashlyn Le, [place] [facility]   10/10/2022  9:30 AM [id number] INJECTION/CATH [id number] [facility]   10/12/2022  9:30 AM Ashlyn Le, PT [facility]   10/14/2022  8:45 AM Ashlyn Le, PT [facility]   10/24/2022  9:00 AM Imer Nolasco MD [id number] [facility]   10/25/2022  8:00 [facility]   10/27/2022  8:00 AM Ashlyn Le, PT [facility]   12/2/2022  8:15 AM TRIM LAB RESOURCE TRIM [facility]   12/9/2022  8:30 AM Gilmer Salas DO TRIM [facility] AM[facility]

## 2022-09-27 ENCOUNTER — HOSPITAL ENCOUNTER (OUTPATIENT)
Dept: PHYSICAL THERAPY | Age: 77
Setting detail: RECURRING SERIES
Discharge: HOME | End: 2022-09-30
Payer: MEDICARE

## 2022-09-27 PROCEDURE — 97530 THERAPEUTIC ACTIVITIES: CPT

## 2022-09-27 PROCEDURE — 97110 THERAPEUTIC EXERCISES: CPT

## 2022-09-27 NOTE — PROGRESS NOTES
Pedro Nolasco  : 1945  Primary: Medicare Part A And B  Secondary: [address] [place]  [address] [facility] [address]  Phone: [phone]  Fax: [phone] Plan Frequency: 2 times a week for 12 weeks    Plan of Care/Certification Expiration Date: 11/10/22      PT Visit Info:  No data recorded   Visit Count:  13   OUTPATIENT PHYSICAL THERAPY:OP NOTE TYPE: Treatment Note 2022       Episode  }Appt Desk             Treatment Diagnosis:  Generalized Muscle Weakness (M62.81)  Difficulty in walking, Not elsewhere classified (R26.2)  Other abnormalities of gait and mobility (R26.89)  Medical/Referring Diagnosis:  Weakness [R53.1]  Referring Physician:  Harriett Medina DO MD Orders:  PT Eval and Treat   Date of Onset:  Onset Date: 21     Allergies:   Patient has no known allergies. Restrictions/Precautions:  Restrictions/Precautions: Fall Risk  Required Braces or Orthoses?: No  No data recorded   Interventions Planned (Treatment may consist of any combination of the following):    Current Treatment Recommendations: Strengthening; ROM; Balance training; Functional mobility training; Transfer training; Endurance training; Gait training; Stair training; Home exercise program; Therapeutic activities     Subjective Comments:   Pt noticing that when he uses a shopping cart that he's able to walk with more rapid pace. Initial:}    0 /10Post Session:     0   /10  Medications Last Reviewed:  2022  Updated Objective Findings:     Below findings from initial evaluation unless otherwise noted.:  Observation  Posture: R thoracic kyphosis  Gait: wide based, with no assistive device, bilaterally shortened step lengths. TUG test: 24.4 seconds, no assistive device. Pt shuffles during turns.      ROM (L/R in °) AROM(PROM)  Hip flexion  130/125  Hip extension  0/0  Hip ER  32/36  Hip IR   16/10    Knee extension  0/0  Knee flexion 140/140    Ankle DF   5/14        Strength (L/R)   Hip flexion  3-/3+  Hip ABD  4-/4-  Hip extension  4/4  Hip ER  3+/4-  Hip IR   3/3+    Knee extension  4/4  Knee flexion   4/4    Ankle DF   4/4  Ankle PF   4/4  Ankle eversion  4/4  Ankle inversion  4/4      Special Tests  Transfers sit-stand w/ bilateral chair rail assist, supine -sit with supervision, sit-supine with min assist to lift R LE onto table. 9/7/2022: Tool Used: Timed Up and Go (TUG)  Score:  Initial: 24.4 seconds Most Recent: 17 seconds (Date: 9/7/22 )   Interpretation of Score: The test measures, in seconds, the time taken by an individual to stand up from a standard arm chair (seat height 46 cm [18 in], arm height 65 cm [25.6 in]), walk a distance of 3 meters (118 in, approx 10 ft), turn, walk back to the chair and sit down. If the individual takes longer than 14 seconds to complete TUG, this indicates risk for falls. Strength (L/R)   Hip flexion  6.3kg/6.2kg  Hip ABD  13kg/5 kg   Hip ER   2.3kg/3.1kg  Hip IR   2.2kg/2.2kg         Knee extn  17.3kg/9.8kg        Knee flex  6.8kg/ 11.4 kg  Treatment     TREATMENT:   THERAPEUTIC ACTIVITY: ( see below for minutes): Therapeutic activities per grid below to improve mobility, strength, balance and coordination. Required minimal visual, verbal, manual and tactile cues to improve independence and safety with daily activities . THERAPEUTIC EXERCISE: (see below for minutes): Exercises per grid below to improve mobility, strength, balance and coordination. Required minimal verbal and manual cues to promote proper body alignment, promote proper body posture and promote proper body mechanics. Progressed resistance, range, repetitions and complexity of movement as indicated.   MANUAL THERAPY: (see below for minutes): Joint mobilization and Soft tissue mobilization was utilized and necessary because of the patient's restricted joint motion, painful spasm, loss of articular motion and restricted motion of soft tissue. MODALITIES: (see below for minutes): to decrease pain   SELF CARE: (see below for minutes): Procedure(s) (per grid) utilized to improve and/or restore self-care/home management as related to dressing, bathing and grooming. Required minimal verbal cueing to facilitate activities of daily living skills and compensatory activities    Goals: (Goals have been discussed and agreed upon with patient.)  Short-Term Functional Goals: Time Frame: 6 weeks  Independent performance of home exercise program (MET)  Improve sit-stand transfers to independent without chair arm assist, supine-sit to independent. (MET)  Improve TUG test score to < 20 seconds (MET)  Discharge Goals: Time Frame: 12 weeks  Improve TUG test score to < 15 seconds. Improve R LE strengths to 4-/5 hip adductors, flexors to allow less fatigue during all transfers and overground gait  Improved standing balance: single leg > 5 seconds, narrow base/tandem to 30\" or better for fall prevention. Date: 22 (visit 7) 22 (visit 8/PN) 22 (visit 9) 9/15/22 (visit 10) 22 (visit 11) 22 (visit 12)  22 (visit 13)    Modalities:                                        Therapeutic Exercise: 30 min 35 min 30 min 30 min 36 min 30 min 28 min    Seated stepper: 7 min, level 3 resist Seated stepper: 8 min, level 4 resist,  LTR's: legs over ball AROM x 1 min, manl resist L/R x 10  Seated stepper x 8 min, level 4 resist Repeat @ level 5 resist, 8 min  Seated stepper: 8  min, level 5 resist.     Supine hip active logrollin x 20x Supine hip active logrolling x 20 L/R Logrollinx L/R R leg logroll stretch 10x Log roll stretch L /R 3 x 10 \" Log roll stretch: 4 x 10\"  Lower trunk rotations L/R w/ manl resist at knees: x 15    Hooklying R hip add/IR x 2 x 8 Hip IR (manl resist): 2 x 5 L/R Hip IR: Log roll manl resist 2 x 5 , hooklying IR 2 x 8 L/R.   Hooklying hip alternating ER/IR; 2 x 8 L/R Manl resist logroll IR\"s L/R 2 x 15 Manl resist logrolls 2 x 10 L/R Manl resist hip IR logrolling : 2 x 15 L/R    Hooklying R hip abd/ER: 3 x 8 manl resist Bridges w/ adducctor squeeze: 3 x 5 reps w/5\" holds, cues for adduction Bridges w/ add squeezes: 2 x 8  Bridges: knees over bolser x 10, calves over bolster x 10 SLR's: L 3 x 8, R 3 x 6 assisted SLR's 2 x 8 L/R (*R side =- assisted)  Bridges w/ holds : 15 x 5\"     Assisted R SLR's: 3 x 8 Leg press (double leg): 20x each at 40# and 60# Leg press DL : 15 each at 60 and 80# Bilat hip logroll IR -manl resist: x 10  Bridges: 10 x w/ bball squeeze: 10x w verbal cues for hip adduction Bridges: 3 x 8 w adductor ball squeezes. Leg press: 4 x 8 @ 85#,     Bridges w/ legs over bolster: manl R pelvis resist: 2 x 10   Leg press: 60# : 3 x 8  Leg press: 4 x 10 : 65#, 70#, 80#, 100#  Leg press: 3 x 8 @ 85#,      Hip adductor squeezes w red ball: 4 x 5 x 5\" holds          Standing leg swings flex/ext: 20x each L/R          Standing marching x 20x                             Proprioceptive Activities:                                        Manual Therapy:                              Functional Activities: 15 min 10 min 15 min 14 min 8 min 14 min 12 min    Long stride steps in // bars: 4 laps TUG x 3 Sit-stand:elevated seat: 3 x 10 w/ 8# ball, cues for weightshift over feet. Sit-stand w/ elevated seat, 6# med ball and manl cues for weightshift. 4 x 8 Long strides in // bars x 3 laps 1 hand asisst Stride weight shifts with 2, 1 and no has assist: 5 min Tandem walking in // bars; 6 laps     Long sidesteps in // bars 4 laps Tightrope walking in // bars - 5 laps, 1 hand on rail assist Tightrope walking in // bars 3 laps, 1 hand on bar Lateral wide stepping w/ hands on rail: 3 laps L/R at // bars Fast stepping (no assistive device)300' Stride step drills x 15 reps L/R Rapid step walking in // bars x 7 laps    Fast walking 5 x 50' Carioca walking in // bars - 2 laps Carioca walking in // bars - 3 laps - 2 hands on bars. Front/ back long stride steps x 10 L/R  Long sttride walking x 240'     Elevated seat sit-stands: 8x, flb 2 x 8 w/ 8# medball  Long stride fast pace walking 3 x 50' Long stride walking x 50'  Fast pace walking x 120'                  Treatment/Session Summary:    Treatment Assessment:    Pt's gluteals tend to overpower pt's adductors /internal rotators during bridges and gait, but pt was able to demonstrate better resisted hip IR (more reps per set before fatiguing). Communication/Consultation:  None today  Equipment provided today:  None  Recommendations/Intent for next treatment session: Next visit will focus on LE strength /coordination with emphasis on RLE reeducation for normal IR /adduction, CKC strength with emphasis  on supine-sit-stand transfers, narrow base /tandem balance, Gait training to increase step length/decrease step width.  .    Total Treatment Billable Duration:  40 minutes  Time In: 7457  Time Out: [address] Kuran, PT       Charge Capture  }Post Session Pain  PT Visit Info  [facility] Portal  MD Guidelines  Scanned Media  Benefits  MyChart    Future Appointments   Date Time Provider Dot Gusman   9/29/2022  8:45 AM Espana Aw, PT [facility]   10/4/2022  8:45 AM Espana Aw, PT [facility]   10/6/2022  8:45 AM [facility]   10/10/2022  9:30 AM [id number] INJECTION/CATH [id number] [facility]   10/12/2022  9:30 AM Espana Aw, PT [facility]   10/14/2022  8:45 AM Espana Aw, PT [facility]   10/24/2022  9:00 AM Wes Berumen MD [id number] [facility] AMB   10/25/2022  8:00 AM [facility]   10/27/2022  8:00 AM [facility] Aw[facility]   12/2/2022  8:15 AM TRIM LAB RESOURCE TRIM [facility] AMB   12/9/2022  8:30 AM Callie Riley DO TRIM [facility]

## 2022-09-29 ENCOUNTER — HOSPITAL ENCOUNTER (OUTPATIENT)
Dept: PHYSICAL THERAPY | Age: 77
Setting detail: RECURRING SERIES
Discharge: HOME | End: 2022-10-02
Payer: MEDICARE

## 2022-09-29 PROCEDURE — 97110 THERAPEUTIC EXERCISES: CPT

## 2022-09-29 PROCEDURE — 97530 THERAPEUTIC ACTIVITIES: CPT

## 2022-09-29 NOTE — PROGRESS NOTES
Nathen Nolasco  : 1945  Primary: Medicare Part A And B  Secondary: [address] [place]  [address] [facility] [address]  Phone: [phone]  Fax: [phone] Plan Frequency: 2 times a week for 12 weeks    Plan of Care/Certification Expiration Date: 11/10/22      PT Visit Info:  No data recorded   Visit Count:  14   OUTPATIENT PHYSICAL THERAPY:OP NOTE TYPE: Treatment Note 2022       Episode  }Appt Desk             Treatment Diagnosis:  Generalized Muscle Weakness (M62.81)  Difficulty in walking, Not elsewhere classified (R26.2)  Other abnormalities of gait and mobility (R26.89)  Medical/Referring Diagnosis:  Weakness [R53.1]  Referring Physician:  Miguel Brooks DO MD Orders:  PT Eval and Treat   Date of Onset:  Onset Date: 21     Allergies:   Patient has no known allergies. Restrictions/Precautions:  Restrictions/Precautions: Fall Risk  Required Braces or Orthoses?: No  No data recorded   Interventions Planned (Treatment may consist of any combination of the following):    Current Treatment Recommendations: Strengthening; ROM; Balance training; Functional mobility training; Transfer training; Endurance training; Gait training; Stair training; Home exercise program; Therapeutic activities     Subjective Comments:   Pt experiencing stiffness when rising from chairs and initiating gait, this eases after several steps. Initial:}    0 /10Post Session:     0   /10  Medications Last Reviewed:  2022  Updated Objective Findings:     Below findings from initial evaluation unless otherwise noted.:  Observation  Posture: R thoracic kyphosis  Gait: wide based, with no assistive device, bilaterally shortened step lengths. TUG test: 24.4 seconds, no assistive device. Pt shuffles during turns.      ROM (L/R in °) AROM(PROM)  Hip flexion  130/125  Hip extension  0/0  Hip ER  32/36  Hip IR   16/10    Knee extension  0/0  Knee flexion 140/140    Ankle DF   5/14        Strength (L/R)   Hip flexion  3-/3+  Hip ABD  4-/4-  Hip extension  4/4  Hip ER  3+/4-  Hip IR   3/3+    Knee extension  4/4  Knee flexion   4/4    Ankle DF   4/4  Ankle PF   4/4  Ankle eversion  4/4  Ankle inversion  4/4      Special Tests  Transfers sit-stand w/ bilateral chair rail assist, supine -sit with supervision, sit-supine with min assist to lift R LE onto table. 9/7/2022: Tool Used: Timed Up and Go (TUG)  Score:  Initial: 24.4 seconds Most Recent: 17 seconds (Date: 9/7/22 )   Interpretation of Score: The test measures, in seconds, the time taken by an individual to stand up from a standard arm chair (seat height 46 cm [18 in], arm height 65 cm [25.6 in]), walk a distance of 3 meters (118 in, approx 10 ft), turn, walk back to the chair and sit down. If the individual takes longer than 14 seconds to complete TUG, this indicates risk for falls. Strength (L/R)   Hip flexion  6.3kg/6.2kg  Hip ABD  13kg/5 kg   Hip ER   2.3kg/3.1kg  Hip IR   2.2kg/2.2kg         Knee extn  17.3kg/9.8kg        Knee flex  6.8kg/ 11.4 kg  Treatment     TREATMENT:   THERAPEUTIC ACTIVITY: ( see below for minutes): Therapeutic activities per grid below to improve mobility, strength, balance and coordination. Required minimal visual, verbal, manual and tactile cues to improve independence and safety with daily activities . THERAPEUTIC EXERCISE: (see below for minutes): Exercises per grid below to improve mobility, strength, balance and coordination. Required minimal verbal and manual cues to promote proper body alignment, promote proper body posture and promote proper body mechanics. Progressed resistance, range, repetitions and complexity of movement as indicated.   MANUAL THERAPY: (see below for minutes): Joint mobilization and Soft tissue mobilization was utilized and necessary because of the patient's restricted joint motion, painful spasm, loss of articular motion and restricted motion of soft tissue. MODALITIES: (see below for minutes): to decrease pain   SELF CARE: (see below for minutes): Procedure(s) (per grid) utilized to improve and/or restore self-care/home management as related to dressing, bathing and grooming. Required minimal verbal cueing to facilitate activities of daily living skills and compensatory activities    Goals: (Goals have been discussed and agreed upon with patient.)  Short-Term Functional Goals: Time Frame: 6 weeks  Independent performance of home exercise program (MET)  Improve sit-stand transfers to independent without chair arm assist, supine-sit to independent. (MET)  Improve TUG test score to < 20 seconds (MET)  Discharge Goals: Time Frame: 12 weeks  Improve TUG test score to < 15 seconds. Improve R LE strengths to 4-/5 hip adductors, flexors to allow less fatigue during all transfers and overground gait  Improved standing balance: single leg > 5 seconds, narrow base/tandem to 30\" or better for fall prevention. Date: 22 (visit 7) 22 (visit 8/PN) 22 (visit 9) 9/15/22 (visit 10) 22 (visit 11) 22 (visit 12)  22 (visit 13)  22 (visit 14)    Modalities:                                            Therapeutic Exercise: 30 min 35 min 30 min 30 min 36 min 30 min 28 min 30 min    Seated stepper: 7 min, level 3 resist Seated stepper: 8 min, level 4 resist,  LTR's: legs over ball AROM x 1 min, manl resist L/R x 10  Seated stepper x 8 min, level 4 resist Repeat @ level 5 resist, 8 min  Seated stepper: 8  min, level 5 resist.  L/R hip IR logroll stretches: 5 x 10\"     Supine hip active logrollin x 20x Supine hip active logrolling x 20 L/R Logrollinx L/R R leg logroll stretch 10x Log roll stretch L /R 3 x 10 \" Log roll stretch: 4 x 10\"  Lower trunk rotations L/R w/ manl resist at knees: x 15 Hooklying hip IR/ER (manl resist) x 15.      Hooklying R hip add/IR x 2 x 8 Hip IR (manl resist): 2 x 5 L/R Hip IR: Log roll manl resist 2 x 5 , hooklying IR 2 x 8 L/R. Hooklying hip alternating ER/IR; 2 x 8 L/R Manl resist logroll IR\"s L/R 2 x 15 Manl resist logrolls 2 x 10 L/R Manl resist hip IR logrolling : 2 x 15 L/R Manl resist hip logroll IR\"s x 15 each    Hooklying R hip abd/ER: 3 x 8 manl resist Bridges w/ adducctor squeeze: 3 x 5 reps w/5\" holds, cues for adduction Bridges w/ add squeezes: 2 x 8  Bridges: knees over bolser x 10, calves over bolster x 10 SLR's: L 3 x 8, R 3 x 6 assisted SLR's 2 x 8 L/R (*R side =- assisted)  Bridges w/ holds : 15 x 5\"  Bridges w/ ball squeeze holds: 10 x 10\"     Assisted R SLR's: 3 x 8 Leg press (double leg): 20x each at 40# and 60# Leg press DL : 15 each at 60 and 80# Bilat hip logroll IR -manl resist: x 10  Bridges: 10 x w/ bball squeeze: 10x w verbal cues for hip adduction Bridges: 3 x 8 w adductor ball squeezes. Leg press: 4 x 8 @ 85#,  Leg Press: 4 x 8 @ 80#    Bridges w/ legs over bolster: manl R pelvis resist: 2 x 10   Leg press: 60# : 3 x 8  Leg press: 4 x 10 : 65#, 70#, 80#, 100#  Leg press: 3 x 8 @ 85#,   LAQ's: 3 x 8: 25#: DL    Hip adductor squeezes w red ball: 4 x 5 x 5\" holds           Standing leg swings flex/ext: 20x each L/R           Standing marching x 20x                                Proprioceptive Activities:                                            Manual Therapy:                                 Functional Activities: 15 min 10 min 15 min 14 min 8 min 14 min 12 min 15 min    Long stride steps in // bars: 4 laps TUG x 3 Sit-stand:elevated seat: 3 x 10 w/ 8# ball, cues for weightshift over feet. Sit-stand w/ elevated seat, 6# med ball and manl cues for weightshift.  4 x 8 Long strides in // bars x 3 laps 1 hand asisst Stride weight shifts with 2, 1 and no has assist: 5 min Tandem walking in // bars; 6 laps  Long stride walking : 2 x 65'    Long sidesteps in // bars 4 laps Tightrope walking in // bars - 5 laps, 1 hand on rail assist Tightrope walking in // bars 3 laps, 1 hand on bar Lateral wide stepping w/ hands on rail: 3 laps L/R at // bars Fast stepping (no assistive device)300' Stride step drills x 15 reps L/R Rapid step walking in // bars x 7 laps Rapid stride walking: 4 x 65'    Fast walking 5 x 50' Carioca walking in // bars - 2 laps Carioca walking in // bars - 3 laps - 2 hands on bars. Front/ back long stride steps x 10 L/R  Long sttride walking x 240'  Parallel bar narrow stance walking 10 laps. Elevated seat sit-stands: 8x, flb 2 x 8 w/ 8# medball  Long stride fast pace walking 3 x 50' Long stride walking x 50'  Fast pace walking x 120'                    Treatment/Session Summary:    Treatment Assessment:    Definite improvements in hip IR strength and improvement in overall step efficiency  - more emphasis on lower extremity strength aiding this. Communication/Consultation:  None today  Equipment provided today:  None  Recommendations/Intent for next treatment session: Next visit will focus on LE strength /coordination with emphasis on RLE reeducation for normal IR /adduction, CKC strength with emphasis  on supine-sit-stand transfers, narrow base /tandem balance, Gait training to increase step length/decrease step width.  .    Total Treatment Billable Duration:  45 minutes  Time In: 5860  Time Out: 0933    Zeke Adhikari PT       Charge Capture  }Post Session Pain  PT Visit Info  [facility] Portal  MD Guidelines  Scanned Media  Benefits  MyChart    Future Appointments   Date Time Provider Dot Gusman   10/4/2022  8:45 AM Zeke Adhikari PT [facility]   10/6/2022  8[facility]   10/10/2022  9:30 AM [id number] INJECTION/CATH [id number] [facility] AMB   10/12/2022  9:30 AM Zeke Adhikari PT [facility]   10/14/2022  8:45 AM Zeke Adhikari PT [facility]   10/24/2022  9:00 AM Amos Paulson MD [id number] [facility]   10/25/2022  8:00 AM Zeke Adhikari PT [facility]   10/27/2022  8:00 AM Zeke Adhikari PT [facility]   12/2/2022  8:15 AM TRIM LAB RESOURCE TRIM [facility] AM[facility]   12/9/2022  8:30 AM Bucky Armenta,  TRIM [facility] AMB

## 2022-10-04 ENCOUNTER — HOSPITAL ENCOUNTER (OUTPATIENT)
Dept: PHYSICAL THERAPY | Age: 77
Setting detail: RECURRING SERIES
Discharge: HOME | End: 2022-10-07
Payer: MEDICARE

## 2022-10-04 PROCEDURE — 97110 THERAPEUTIC EXERCISES: CPT

## 2022-10-04 PROCEDURE — 97530 THERAPEUTIC ACTIVITIES: CPT

## 2022-10-04 NOTE — PROGRESS NOTES
Melinda Nolasco  : 1945  Primary: Medicare Part A And B  Secondary: [address] [place]  [address] [facility] [address]  Phone: [phone]  Fax: [phone] Plan Frequency: 2 times a week for 12 weeks    Plan of Care/Certification Expiration Date: 11/10/22      PT Visit Info:  No data recorded   Visit Count:  15   OUTPATIENT PHYSICAL THERAPY:OP NOTE TYPE: Treatment Note 10/4/2022       Episode  }Appt Desk             Treatment Diagnosis:  Generalized Muscle Weakness (M62.81)  Difficulty in walking, Not elsewhere classified (R26.2)  Other abnormalities of gait and mobility (R26.89)  Medical/Referring Diagnosis:  Weakness [R53.1]  Referring Physician:  Carlos Manuel Ojeda DO MD Orders:  PT Eval and Treat   Date of Onset:  Onset Date: 21     Allergies:   Patient has no known allergies. Restrictions/Precautions:  Restrictions/Precautions: Fall Risk  Required Braces or Orthoses?: No  No data recorded   Interventions Planned (Treatment may consist of any combination of the following):    Current Treatment Recommendations: Strengthening; ROM; Balance training; Functional mobility training; Transfer training; Endurance training; Gait training; Stair training; Home exercise program; Therapeutic activities     Subjective Comments:   Notes that leg presses are becoming less difficult, ready for more weight resistance. States that sidestepping is difficult and feels unsteady. Initial:}    0 /10Post Session:     0   /10  Medications Last Reviewed:  10/4/2022  Updated Objective Findings:     Below findings from initial evaluation unless otherwise noted.:  Observation  Posture: R thoracic kyphosis  Gait: wide based, with no assistive device, bilaterally shortened step lengths. TUG test: 24.4 seconds, no assistive device. Pt shuffles during turns.      ROM (L/R in °) AROM(PROM)  Hip flexion  130/125  Hip extension  0/0  Hip ER  32/36  Hip IR 16/10    Knee extension  0/0  Knee flexion   140/140    Ankle DF   5/14        Strength (L/R)   Hip flexion  3-/3+  Hip ABD  4-/4-  Hip extension  4/4  Hip ER  3+/4-  Hip IR   3/3+    Knee extension  4/4  Knee flexion   4/4    Ankle DF   4/4  Ankle PF   4/4  Ankle eversion  4/4  Ankle inversion  4/4      Special Tests  Transfers sit-stand w/ bilateral chair rail assist, supine -sit with supervision, sit-supine with min assist to lift R LE onto table. 9/7/2022: Tool Used: Timed Up and Go (TUG)  Score:  Initial: 24.4 seconds Most Recent: 17 seconds (Date: 9/7/22 )   Interpretation of Score: The test measures, in seconds, the time taken by an individual to stand up from a standard arm chair (seat height 46 cm [18 in], arm height 65 cm [25.6 in]), walk a distance of 3 meters (118 in, approx 10 ft), turn, walk back to the chair and sit down. If the individual takes longer than 14 seconds to complete TUG, this indicates risk for falls. Strength (L/R)   Hip flexion  6.3kg/6.2kg  Hip ABD  13kg/5 kg   Hip ER   2.3kg/3.1kg  Hip IR   2.2kg/2.2kg         Knee extn  17.3kg/9.8kg        Knee flex  6.8kg/ 11.4 kg  Treatment     TREATMENT:   THERAPEUTIC ACTIVITY: ( see below for minutes): Therapeutic activities per grid below to improve mobility, strength, balance and coordination. Required minimal visual, verbal, manual and tactile cues to improve independence and safety with daily activities . THERAPEUTIC EXERCISE: (see below for minutes): Exercises per grid below to improve mobility, strength, balance and coordination. Required minimal verbal and manual cues to promote proper body alignment, promote proper body posture and promote proper body mechanics. Progressed resistance, range, repetitions and complexity of movement as indicated.   MANUAL THERAPY: (see below for minutes): Joint mobilization and Soft tissue mobilization was utilized and necessary because of the patient's restricted joint motion, painful spasm, loss of articular motion and restricted motion of soft tissue. MODALITIES: (see below for minutes): to decrease pain   SELF CARE: (see below for minutes): Procedure(s) (per grid) utilized to improve and/or restore self-care/home management as related to dressing, bathing and grooming. Required minimal verbal cueing to facilitate activities of daily living skills and compensatory activities    Goals: (Goals have been discussed and agreed upon with patient.)  Short-Term Functional Goals: Time Frame: 6 weeks  Independent performance of home exercise program (MET)  Improve sit-stand transfers to independent without chair arm assist, supine-sit to independent. (MET)  Improve TUG test score to < 20 seconds (MET)  Discharge Goals: Time Frame: 12 weeks  Improve TUG test score to < 15 seconds. Improve R LE strengths to 4-/5 hip adductors, flexors to allow less fatigue during all transfers and overground gait  Improved standing balance: single leg > 5 seconds, narrow base/tandem to 30\" or better for fall prevention. Date: 9/15/22 (visit 10) 9/20/22 (visit 11) 9/22/22 (visit 12)  9/27/22 (visit 13)  9/29/22 (visit 14)  10/4/22 (visit 15)    Modalities:                                    Therapeutic Exercise: 30 min 36 min 30 min 28 min 30 min 33 min    Seated stepper x 8 min, level 4 resist Repeat @ level 5 resist, 8 min  Seated stepper: 8  min, level 5 resist.  L/R hip IR logroll stretches: 5 x 10\"  L/R hip logrolll IR AROM x 10 , /f b manl resist L/R 3 x 15    R leg logroll stretch 10x Log roll stretch L /R 3 x 10 \" Log roll stretch: 4 x 10\"  Lower trunk rotations L/R w/ manl resist at knees: x 15 Hooklying hip IR/ER (manl resist) x 15. Hooklying hip IR/ER (manl resist) 2 x 10.     Hooklying hip alternating ER/IR; 2 x 8 L/R Manl resist logroll IR\"s L/R 2 x 15 Manl resist logrolls 2 x 10 L/R Manl resist hip IR logrolling : 2 x 15 L/R Manl resist hip logroll IR\"s x 15 each     Bridges: knees over bolser x 10, calves over bolster x 10 SLR's: L 3 x 8, R 3 x 6 assisted SLR's 2 x 8 L/R (*R side =- assisted)  Bridges w/ holds : 15 x 5\"  Bridges w/ ball squeeze holds: 10 x 10\"  Bridges w/ ball squeeze: 2 x 10     Bilat hip logroll IR -manl resist: x 10  Bridges: 10 x w/ bball squeeze: 10x w verbal cues for hip adduction Bridges: 3 x 8 w adductor ball squeezes. Leg press: 4 x 8 @ 85#,  Leg Press: 4 x 8 @ 80# Leg press: 3 x 10: (2 @ 85#, 1 @ 90#)    Leg press: 60# : 3 x 8  Leg press: 4 x 10 : 65#, 70#, 80#, 100#  Leg press: 3 x 8 @ 85#,   LAQ's: 3 x 8: 25#: DL LAQ's: 3 x 8 @ 27.5# (double leg)                                                Proprioceptive Activities:                                    Manual Therapy:                           Functional Activities: 14 min 8 min 14 min 12 min 15 min 12 min    Sit-stand w/ elevated seat, 6# med ball and manl cues for weightshift. 4 x 8 Long strides in // bars x 3 laps 1 hand asisst Stride weight shifts with 2, 1 and no has assist: 5 min Tandem walking in // bars; 6 laps  Long stride walking : 2 x 65' // bars - narrow stance w/ long stride walking x 10 laps    Lateral wide stepping w/ hands on rail: 3 laps L/R at // bars Fast stepping (no assistive device)300' Stride step drills x 15 reps L/R Rapid step walking in // bars x 7 laps Rapid stride walking: 4 x 65' Lateral stepping (no hand support) x 6 laps in front of // bars (no hands)    Front/ back long stride steps x 10 L/R  Long sttride walking x 240'  Parallel bar narrow stance walking 10 laps. Long rapid stride walking: 3 x 65 feet    Long stride walking x 50'  Fast pace walking x 120'                   Treatment/Session Summary:    Treatment Assessment:    Strength improvements in last fiew visits with concomitant improvement in step/stride stability.    Communication/Consultation:  None today  Equipment provided today:  None  Recommendations/Intent for next treatment session: Next visit will focus on LE strength /coordination with emphasis on RLE reeducation for normal IR /adduction, CKC strength with emphasis  on supine-sit-stand transfers, narrow base /tandem balance, Gait training to increase step length/decrease step width.  .    Total Treatment Billable Duration:  45 minutes  Time In: 0845  Time Out: 0932    Alicia Marquis PT       Charge Capture  }Post Session Pain  PT Visit Info  [employer] Portal  MD Guidelines  Scanned Media  Benefits  MyChart    Future Appointments   Date Time Provider Dot Gusman   10[facility]2022  [facility]   10/10/2022  9:30 AM [id number] INJECTION/CATH [id number] [facility] AMB   10/12/2022  9:30 AM Alicia Marquis PT [facility] O   10/14/2022  8:45 AM Alicia Marquis, PT [facility] O   10/24/2022  9:00 AM Neema Monzon MD [id number] [facility] AMB   10/25/2022  8:00 AM Alicia Marquis, PT [facility]   10/27/2022  [facility]   12/2/2022  8:15 AM TRIM LAB RESOURCE TRIM [facility] AMB   12/9/2022  8:30 AM Lugene Bamberger, DO [facility]IM [facility] AMB

## 2022-10-06 ENCOUNTER — HOSPITAL ENCOUNTER (OUTPATIENT)
Dept: PHYSICAL THERAPY | Age: 77
Setting detail: RECURRING SERIES
Discharge: HOME | End: 2022-10-09
Payer: MEDICARE

## 2022-10-06 PROCEDURE — 97530 THERAPEUTIC ACTIVITIES: CPT

## 2022-10-06 PROCEDURE — 97110 THERAPEUTIC EXERCISES: CPT

## 2022-10-06 NOTE — PROGRESS NOTES
Jie Nolasco  : 1945  Primary: Medicare Part A And B  Secondary: [address] [place]  [address] [facility] [address]  Phone: [phone]  Fax: [phone] Plan Frequency: 2 times a week for 12 weeks    Plan of Care/Certification Expiration Date: 11/10/22      PT Visit Info:  No data recorded   Visit Count:  16   OUTPATIENT PHYSICAL THERAPY:OP NOTE TYPE: Progress Report / Treatment Note 10/6/2022       Episode  }Appt Desk             Treatment Diagnosis:  Generalized Muscle Weakness (M62.81)  Difficulty in walking, Not elsewhere classified (R26.2)  Other abnormalities of gait and mobility (R26.89)  Medical/Referring Diagnosis:  Weakness [R53.1]  Referring Physician:  Katina Dennis DO MD Orders:  PT Eval and Treat   Date of Onset:  Onset Date: 21     Allergies:   Patient has no known allergies. Restrictions/Precautions:  Restrictions/Precautions: Fall Risk  Required Braces or Orthoses?: No  No data recorded   Interventions Planned (Treatment may consist of any combination of the following):    Current Treatment Recommendations: Strengthening; ROM; Balance training; Functional mobility training; Transfer training; Endurance training; Gait training; Stair training; Home exercise program; Therapeutic activities     Subjective Comments:   Pt states that leg presses are becoming easier to perform. Initial:}    0 10Post Session:     0   10  Medications Last Reviewed:  10/6/2022  Updated Objective Findings:     Below findings from initial evaluation unless otherwise noted.:  Observation  Posture: R thoracic kyphosis  Gait: wide based, with no assistive device, bilaterally shortened step lengths. TUG test: 24.4 seconds, no assistive device. Pt shuffles during turns.      ROM (L/R in °) AROM(PROM)  Hip flexion  130/125  Hip extension  0/0  Hip ER  32/36  Hip IR   16/10    Knee extension  0/0  Knee flexion   140/140    Ankle DF 5/14        Strength (L/R)   Hip flexion  3-/3+  Hip ABD  4-/4-  Hip extension  4/4  Hip ER  3+/4-  Hip IR   3/3+    Knee extension  4/4  Knee flexion   4/4    Ankle DF   4/4  Ankle PF   4/4  Ankle eversion  4/4  Ankle inversion  4/4      Special Tests  Transfers sit-stand w/ bilateral chair rail assist, supine -sit with supervision, sit-supine with min assist to lift R LE onto table. 9/7/2022: Tool Used: Timed Up and Go (TUG)  Score:  Initial: 24.4 seconds Most Recent: 17 seconds (Date: 9/7/22 )   Interpretation of Score: The test measures, in seconds, the time taken by an individual to stand up from a standard arm chair (seat height 46 cm [18 in], arm height 65 cm [25.6 in]), walk a distance of 3 meters (118 in, approx 10 ft), turn, walk back to the chair and sit down. If the individual takes longer than 14 seconds to complete TUG, this indicates risk for falls. Strength (L/R)   Hip flexion  6.3kg/6.2kg  Hip ABD  13kg/5 kg   Hip ER   2.3kg/3.1kg  Hip IR   2.2kg/2.2kg         Knee extn  17.3kg/9.8kg        Knee flex  6.8kg/ 11.4 kg    10/5/22:   Hip adduction: left = 3+/5, right 3-/5    Tool Used: Timed Up and Go (TUG)  Score:  Initial: 24.4 seconds Most Recent: 15.6 seconds (Date: 10/5/22 )     Treatment     TREATMENT:   THERAPEUTIC ACTIVITY: ( see below for minutes): Therapeutic activities per grid below to improve mobility, strength, balance and coordination. Required minimal visual, verbal, manual and tactile cues to improve independence and safety with daily activities . THERAPEUTIC EXERCISE: (see below for minutes): Exercises per grid below to improve mobility, strength, balance and coordination. Required minimal verbal and manual cues to promote proper body alignment, promote proper body posture and promote proper body mechanics. Progressed resistance, range, repetitions and complexity of movement as indicated.   MANUAL THERAPY: (see below for minutes): Joint mobilization and Soft tissue mobilization was utilized and necessary because of the patient's restricted joint motion, painful spasm, loss of articular motion and restricted motion of soft tissue. MODALITIES: (see below for minutes): to decrease pain   SELF CARE: (see below for minutes): Procedure(s) (per grid) utilized to improve and/or restore self-care/home management as related to dressing, bathing and grooming. Required minimal verbal cueing to facilitate activities of daily living skills and compensatory activities    Goals: (Goals have been discussed and agreed upon with patient.)  Short-Term Functional Goals: Time Frame: 6 weeks  Independent performance of home exercise program (MET)  Improve sit-stand transfers to independent without chair arm assist, supine-sit to independent. (MET)  Improve TUG test score to < 20 seconds (MET)  Discharge Goals: Time Frame: 12 weeks  Improve TUG test score to < 15 seconds. (Progressing)  Improve R LE strengths to 4-/5 hip adductors, flexors to allow less fatigue during all transfers and overground gait (progressing)  Improved standing balance: single leg > 5 seconds, narrow base/tandem to 30\" or better for fall prevention. (Progressing)    Date: 9/15/22 (visit 10) 9/20/22 (visit 11) 9/22/22 (visit 12)  9/27/22 (visit 13)  9/29/22 (visit 14)  10/4/22 (visit 15)  10/6/22 (visit 16/PN)   Modalities:                                        Therapeutic Exercise: 30 min 36 min 30 min 28 min 30 min 33 min 32 min    Seated stepper x 8 min, level 4 resist Repeat @ level 5 resist, 8 min  Seated stepper: 8  min, level 5 resist.  L/R hip IR logroll stretches: 5 x 10\"  L/R hip logrolll IR AROM x 10 , /f b manl resist L/R 3 x 15 L/R hip logrolling arom x 10, manl resist 2 x 10 L/R    R leg logroll stretch 10x Log roll stretch L /R 3 x 10 \" Log roll stretch: 4 x 10\"  Lower trunk rotations L/R w/ manl resist at knees: x 15 Hooklying hip IR/ER (manl resist) x 15. Hooklying hip IR/ER (manl resist) 2 x 10.  Supine hip slide adductions : 3 x 10 L/R. Hooklying hip alternating ER/IR; 2 x 8 L/R Manl resist logroll IR\"s L/R 2 x 15 Manl resist logrolls 2 x 10 L/R Manl resist hip IR logrolling : 2 x 15 L/R Manl resist hip logroll IR\"s x 15 each  Bridges w/ large ball squeeze btwn knees; 5 x 3 reps x 5\"     Bridges: knees over bolser x 10, calves over bolster x 10 SLR's: L 3 x 8, R 3 x 6 assisted SLR's 2 x 8 L/R (*R side =- assisted)  Bridges w/ holds : 15 x 5\"  Bridges w/ ball squeeze holds: 10 x 10\"  Bridges w/ ball squeeze: 2 x 10  Leg press: 90# DL: 3 x 12    Bilat hip logroll IR -manl resist: x 10  Bridges: 10 x w/ bball squeeze: 10x w verbal cues for hip adduction Bridges: 3 x 8 w adductor ball squeezes. Leg press: 4 x 8 @ 85#,  Leg Press: 4 x 8 @ 80# Leg press: 3 x 10: (2 @ 85#, 1 @ 90#)     Leg press: 60# : 3 x 8  Leg press: 4 x 10 : 65#, 70#, 80#, 100#  Leg press: 3 x 8 @ 85#,   LAQ's: 3 x 8: 25#: DL LAQ's: 3 x 8 @ 27.5# (double leg)                                                      Proprioceptive Activities:                                        Manual Therapy:                              Functional Activities: 14 min 8 min 14 min 12 min 15 min 12 min 12 min    Sit-stand w/ elevated seat, 6# med ball and manl cues for weightshift.  4 x 8 Long strides in // bars x 3 laps 1 hand asisst Stride weight shifts with 2, 1 and no has assist: 5 min Tandem walking in // bars; 6 laps  Long stride walking : 2 x 65' // bars - narrow stance w/ long stride walking x 10 laps // bars; narrow base stance w/ long stride walking x 5 laps    Lateral wide stepping w/ hands on rail: 3 laps L/R at // bars Fast stepping (no assistive device)300' Stride step drills x 15 reps L/R Rapid step walking in // bars x 7 laps Rapid stride walking: 4 x 65' Lateral stepping (no hand support) x 6 laps in front of // bars (no hands) Standing calf raises  off back of // bar ramp: 20x, stride calf raises for gait pushoff 2 x 10 L/R     Front/ back long stride steps x 10 L/R  Long sttride walking x 240'  Parallel bar narrow stance walking 10 laps. Long rapid stride walking: 3 x 65 feet Rapid gait in // bars x 5 laps    Long stride walking x 50'  Fast pace walking x 120'    TUG test x 2                 Treatment/Session Summary:    Treatment Assessment:Pt continuing to demonstrate progress, with faster TUG test and increase in resistance with leg presses. Progressign with all LTG's. .   Communication/Consultation:  None today  Equipment provided today:  None  Recommendations/Intent for next treatment session: Next visit will focus on LE strength /coordination with emphasis on RLE reeducation for normal IR /adduction, CKC strength with emphasis  on supine-sit-stand transfers, narrow base /tandem balance, Gait training to increase step length/decrease step width.  .    Total Treatment Billable Duration:  44 minutes  Time In: 0845  Time Out: 0930    Sarah Roberts, PT       Charge Capture  }Post Session Pain  PT Visit Info  [employer] Portal  MD Guidelines  Scanned Media  Benefits  MyChart    Future Appointments   Date Time Provider Dot Gusman   10/10/2022  9:30 AM [id number] INJECTION/CATH [id number] [facility] AMB   10/12/2022  9:30 AM Sarah Roberts PT [facility]   10/14/2022  8:45 AM Sarah Roberts PT [facility]   10/24/2022  9:00 AM Vijaya Buchanan MD [id number] [facility] AMB   10/25/2022  8:00 AM Sarah Roberts PT [facility]   10[facility]27[facility]2022  [facility]   12/2/2022  8:15 AM TRIM LAB RESOURCE TRIM [facility] AMB   12/9/2022  8:30 AM Jacques Thornton DO TRIM [facility] AMB

## 2022-10-10 ENCOUNTER — NURSE ONLY (OUTPATIENT)
Dept: UROLOGY | Age: 77
End: 2022-10-10
Payer: MEDICARE

## 2022-10-10 DIAGNOSIS — R33.9 URINARY RETENTION: Primary | ICD-10-CM

## 2022-10-10 DIAGNOSIS — Z46.6 URINARY CATHETER CHANGE REQUIRED: ICD-10-CM

## 2022-10-10 PROCEDURE — 51702 INSERT TEMP BLADDER CATH: CPT | Performed by: UROLOGY

## 2022-10-10 NOTE — PROGRESS NOTES
Pt came in for q 4 weeks catheter change. 20f coude carr catheter changed without incident. Patient was concerned the catheter might fall out as it had previouslt, a5mL placed in balloon. Patient tolerated procedure well.

## 2022-10-12 ENCOUNTER — HOSPITAL ENCOUNTER (OUTPATIENT)
Dept: PHYSICAL THERAPY | Age: 77
Setting detail: RECURRING SERIES
Discharge: HOME | End: 2022-10-15
Payer: MEDICARE

## 2022-10-12 PROCEDURE — 97110 THERAPEUTIC EXERCISES: CPT

## 2022-10-12 PROCEDURE — 97530 THERAPEUTIC ACTIVITIES: CPT

## 2022-10-12 NOTE — PROGRESS NOTES
Jose Francisco Nolasco  : 1945  Primary: Medicare Part A And B  Secondary: [address] @ [facility] AT [address] [address]  Phone: [phone]  Fax: [phone] Plan Frequency: 2 times a week for 12 weeks    Plan of Care/Certification Expiration Date: 11/10/22      PT Visit Info:  No data recorded   Visit Count:  17   OUTPATIENT PHYSICAL BYRDQRK:KF NOTE TYPE: Treatment Note 10/12/2022       Episode  }Appt Desk             Treatment Diagnosis:  Generalized Muscle Weakness (M62.81)  Difficulty in walking, Not elsewhere classified (R26.2)  Other abnormalities of gait and mobility (R26.89)  Medical/Referring Diagnosis:  Weakness [R53.1]  Referring Physician:  Audrey Chicas DO MD Orders:  PT Eval and Treat   Date of Onset:  Onset Date: 21     Allergies:   Patient has no known allergies. Restrictions/Precautions:  Restrictions/Precautions: Fall Risk  Required Braces or Orthoses?: No  No data recorded   Interventions Planned (Treatment may consist of any combination of the following):    Current Treatment Recommendations: Strengthening; ROM; Balance training; Functional mobility training; Transfer training; Endurance training; Gait training; Stair training; Home exercise program; Therapeutic activities     Subjective Comments:   Pt states that his legs feel tired/wobbly today. Initial:}    0 /10Post Session:     0   /10  Medications Last Reviewed:  10/12/2022  Updated Objective Findings:     Below findings from initial evaluation unless otherwise noted.:  Observation  Posture: R thoracic kyphosis  Gait: wide based, with no assistive device, bilaterally shortened step lengths. TUG test: 24.4 seconds, no assistive device. Pt shuffles during turns.      ROM (L/R in °) AROM(PROM)  Hip flexion  130/125  Hip extension  0/0  Hip ER  32/36  Hip IR   16/10    Knee extension  0/0  Knee flexion   140/140    Ankle DF   5/14        Strength (L/R)   Hip flexion  3-/3+  Hip ABD  4-/4-  Hip extension  4/4  Hip ER  3+/4-  Hip IR   3/3+    Knee extension  4/4  Knee flexion   4/4    Ankle DF   4/4  Ankle PF   4/4  Ankle eversion  4/4  Ankle inversion  4/4      Special Tests  Transfers sit-stand w/ bilateral chair rail assist, supine -sit with supervision, sit-supine with min assist to lift R LE onto table. 9/7/2022: Tool Used: Timed Up and Go (TUG)  Score:  Initial: 24.4 seconds Most Recent: 17 seconds (Date: 9/7/22 )   Interpretation of Score: The test measures, in seconds, the time taken by an individual to stand up from a standard arm chair (seat height 46 cm [18 in], arm height 65 cm [25.6 in]), walk a distance of 3 meters (118 in, approx 10 ft), turn, walk back to the chair and sit down. If the individual takes longer than 14 seconds to complete TUG, this indicates risk for falls. Strength (L/R)   Hip flexion  6.3kg/6.2kg  Hip ABD  13kg/5 kg   Hip ER   2.3kg/3.1kg  Hip IR   2.2kg/2.2kg         Knee extn  17.3kg/9.8kg        Knee flex  6.8kg/ 11.4 kg    10/5/22:   Hip adduction: left = 3+/5, right 3-/5    Tool Used: Timed Up and Go (TUG)  Score:  Initial: 24.4 seconds Most Recent: 15.6 seconds (Date: 10/5/22 )     Treatment     TREATMENT:   THERAPEUTIC ACTIVITY: ( see below for minutes): Therapeutic activities per grid below to improve mobility, strength, balance and coordination. Required minimal visual, verbal, manual and tactile cues to improve independence and safety with daily activities . THERAPEUTIC EXERCISE: (see below for minutes): Exercises per grid below to improve mobility, strength, balance and coordination. Required minimal verbal and manual cues to promote proper body alignment, promote proper body posture and promote proper body mechanics. Progressed resistance, range, repetitions and complexity of movement as indicated.   MANUAL THERAPY: (see below for minutes): Joint mobilization and Soft tissue mobilization was utilized and necessary because of the patient's restricted joint motion, painful spasm, loss of articular motion and restricted motion of soft tissue. MODALITIES: (see below for minutes): to decrease pain   SELF CARE: (see below for minutes): Procedure(s) (per grid) utilized to improve and/or restore self-care/home management as related to dressing, bathing and grooming. Required minimal verbal cueing to facilitate activities of daily living skills and compensatory activities    Goals: (Goals have been discussed and agreed upon with patient.)  Short-Term Functional Goals: Time Frame: 6 weeks  Independent performance of home exercise program (MET)  Improve sit-stand transfers to independent without chair arm assist, supine-sit to independent. (MET)  Improve TUG test score to < 20 seconds (MET)  Discharge Goals: Time Frame: 12 weeks  Improve TUG test score to < 15 seconds. (Progressing)  Improve R LE strengths to 4-/5 hip adductors, flexors to allow less fatigue during all transfers and overground gait (progressing)  Improved standing balance: single leg > 5 seconds, narrow base/tandem to 30\" or better for fall prevention. (Progressing)    Date: 9/27/22 (visit 13)  9/29/22 (visit 14)  10/4/22 (visit 15)  10/6/22 (visit 16/PN) 10/12/22  (visit 17)    Modalities:                                Therapeutic Exercise: 28 min 30 min 33 min 32 min 36 min    Seated stepper: 8  min, level 5 resist.  L/R hip IR logroll stretches: 5 x 10\"  L/R hip logrolll IR AROM x 10 , /f b manl resist L/R 3 x 15 L/R hip logrolling arom x 10, manl resist 2 x 10 L/R Seated stepper: 8 min @ #5 resist.     Lower trunk rotations L/R w/ manl resist at knees: x 15 Hooklying hip IR/ER (manl resist) x 15. Hooklying hip IR/ER (manl resist) 2 x 10. Supine hip slide adductions : 3 x 10 L/R.   Supine hip logrolling - light manl resist: 2 x 15 L/R    Manl resist hip IR logrolling : 2 x 15 L/R Manl resist hip logroll IR\"s x 15 each  Bridges w/ large ball squeeze btwn

## 2022-10-14 ENCOUNTER — HOSPITAL ENCOUNTER (OUTPATIENT)
Dept: PHYSICAL THERAPY | Age: 77
Setting detail: RECURRING SERIES
Discharge: HOME | End: 2022-10-17
Payer: MEDICARE

## 2022-10-14 PROCEDURE — 97530 THERAPEUTIC ACTIVITIES: CPT

## 2022-10-14 PROCEDURE — 97110 THERAPEUTIC EXERCISES: CPT

## 2022-10-14 NOTE — PROGRESS NOTES
Carlos Nolasco  : 1945  Primary: Medicare Part A And B  Secondary: [address] @ [facility]  [address] [id number]  Phone: [phone]  Fax: [phone] Plan Frequency: 2 times a week for 12 weeks    Plan of Care/Certification Expiration Date: 11/10/22      PT Visit Info:  No data recorded   Visit Count:  18   OUTPATIENT PHYSICAL THERAPY:OP NOTE TYPE: Treatment Note 10/14/2022       Episode  }Appt Desk             Treatment Diagnosis:  Generalized Muscle Weakness (M62.81)  Difficulty in walking, Not elsewhere classified (R26.2)  Other abnormalities of gait and mobility (R26.89)  Medical/Referring Diagnosis:  Weakness [R53.1]  Referring Physician:  Rafael Giles DO MD Orders:  PT Eval and Treat   Date of Onset:  Onset Date: 21     Allergies:   Patient has no known allergies. Restrictions/Precautions:  Restrictions/Precautions: Fall Risk  Required Braces or Orthoses?: No  No data recorded   Interventions Planned (Treatment may consist of any combination of the following):    Current Treatment Recommendations: Strengthening; ROM; Balance training; Functional mobility training; Transfer training; Endurance training; Gait training; Stair training; Home exercise program; Therapeutic activities     Subjective Comments:   Walking is \" about the same\" but legs feeling stronger. Initial:}    0 10Post Session:     0   /10  Medications Last Reviewed:  10/14/2022  Updated Objective Findings:     Below findings from initial evaluation unless otherwise noted.:  Observation  Posture: R thoracic kyphosis  Gait: wide based, with no assistive device, bilaterally shortened step lengths. TUG test: 24.4 seconds, no assistive device. Pt shuffles during turns.      ROM (L/R in °) AROM(PROM)  Hip flexion  130/125  Hip extension  0/0  Hip ER  32/36  Hip IR   16/10    Knee extension  0/0  Knee flexion   140/140    Ankle DF   /14        Strength (L/R)   Hip flexion  3-/3+  Hip ABD  4-/4-  Hip extension  4/4  Hip ER  3+/4-  Hip IR   3/3+    Knee extension  4/4  Knee flexion   4/4    Ankle DF   4/4  Ankle PF   4/4  Ankle eversion  4/4  Ankle inversion  4/4      Special Tests  Transfers sit-stand w/ bilateral chair rail assist, supine -sit with supervision, sit-supine with min assist to lift R LE onto table. 9/7/2022: Tool Used: Timed Up and Go (TUG)  Score:  Initial: 24.4 seconds Most Recent: 17 seconds (Date: 9/7/22 )   Interpretation of Score: The test measures, in seconds, the time taken by an individual to stand up from a standard arm chair (seat height 46 cm [18 in], arm height 65 cm [25.6 in]), walk a distance of 3 meters (118 in, approx 10 ft), turn, walk back to the chair and sit down. If the individual takes longer than 14 seconds to complete TUG, this indicates risk for falls. Strength (L/R)   Hip flexion  6.3kg/6.2kg  Hip ABD  13kg/5 kg   Hip ER   2.3kg/3.1kg  Hip IR   2.2kg/2.2kg         Knee extn  17.3kg/9.8kg        Knee flex  6.8kg/ 11.4 kg    10/5/22:   Hip adduction: left = 3+/5, right 3-/5    Tool Used: Timed Up and Go (TUG)  Score:  Initial: 24.4 seconds Most Recent: 15.6 seconds (Date: 10/5/22 )     Treatment     TREATMENT:   THERAPEUTIC ACTIVITY: ( see below for minutes): Therapeutic activities per grid below to improve mobility, strength, balance and coordination. Required minimal visual, verbal, manual and tactile cues to improve independence and safety with daily activities . THERAPEUTIC EXERCISE: (see below for minutes): Exercises per grid below to improve mobility, strength, balance and coordination. Required minimal verbal and manual cues to promote proper body alignment, promote proper body posture and promote proper body mechanics. Progressed resistance, range, repetitions and complexity of movement as indicated.   MANUAL THERAPY: (see below for minutes): Joint mobilization and Soft tissue mobilization was utilized and necessary because of the patient's restricted joint motion, painful spasm, loss of articular motion and restricted motion of soft tissue. MODALITIES: (see below for minutes): to decrease pain   SELF CARE: (see below for minutes): Procedure(s) (per grid) utilized to improve and/or restore self-care/home management as related to dressing, bathing and grooming. Required minimal verbal cueing to facilitate activities of daily living skills and compensatory activities    Goals: (Goals have been discussed and agreed upon with patient.)  Short-Term Functional Goals: Time Frame: 6 weeks  Independent performance of home exercise program (MET)  Improve sit-stand transfers to independent without chair arm assist, supine-sit to independent. (MET)  Improve TUG test score to < 20 seconds (MET)  Discharge Goals: Time Frame: 12 weeks  Improve TUG test score to < 15 seconds. (Progressing)  Improve R LE strengths to 4-/5 hip adductors, flexors to allow less fatigue during all transfers and overground gait (progressing)  Improved standing balance: single leg > 5 seconds, narrow base/tandem to 30\" or better for fall prevention. (Progressing)    Date: 9/27/22 (visit 13)  9/29/22 (visit 14)  10/4/22 (visit 15)  10/6/22 (visit 16/PN) 10/12/22  (visit 17)  10/14/22 (visit 18)   Modalities:                                    Therapeutic Exercise: 28 min 30 min 33 min 32 min 36 min 30 min    Seated stepper: 8  min, level 5 resist.  L/R hip IR logroll stretches: 5 x 10\"  L/R hip logrolll IR AROM x 10 , /f b manl resist L/R 3 x 15 L/R hip logrolling arom x 10, manl resist 2 x 10 L/R Seated stepper: 8 min @ #5 resist.  Seated stepper: 8 min @ #6 resist.     Lower trunk rotations L/R w/ manl resist at knees: x 15 Hooklying hip IR/ER (manl resist) x 15. Hooklying hip IR/ER (manl resist) 2 x 10. Supine hip slide adductions : 3 x 10 L/R.   Supine hip logrolling - light manl resist: 2 x 15 L/R Supine hip logrolling - light manl resist: 2 x 15 L/R    Manl resist hip IR logrolling : 2 x 15 L/R Manl resist hip logroll IR\"s x 15 each  Bridges w/ large ball squeeze btwn knees; 5 x 3 reps x 5\"  Supine hip slide adduction: 15 and 10 (manl resist): left /right. Supine hip slide adductions 2 x 10 L/R    Bridges w/ holds : 15 x 5\"  Bridges w/ ball squeeze holds: 10 x 10\"  Bridges w/ ball squeeze: 2 x 10  Leg press: 90# DL: 3 x 12 Hip adductor squeezes hooklying - red ball: 4 x 5  x 5\"  Hooklying hip alternating abd/add x 10     Leg press: 4 x 8 @ 85#,  Leg Press: 4 x 8 @ 80# Leg press: 3 x 10: (2 @ 85#, 1 @ 90#)  Manl resist HS curls seated: 3 x 8 L/R Bridge w/ ball squeeze btwn knees (red ball) 2 x 10      LAQ's: 3 x 8: 25#: DL LAQ's: 3 x 8 @ 27.5# (double leg)  Leg press - 3 x 8 @ 100#, 1  8 # 105#. Leg press: 110#: 12x, 2 x 8        LAQ's: 3 x 8@ 27. 5#.                                         Proprioceptive Activities:                                    Manual Therapy:                           Functional Activities: 12 min 15 min 12 min 12 min 12 min 14 min    Tandem walking in // bars; 6 laps  Long stride walking : 2 x 65' // bars - narrow stance w/ long stride walking x 10 laps // bars; narrow base stance w/ long stride walking x 5 laps // bars : marching in place x 8 L/R, f/b high knee walking  x 3 laps, high knee sidesteps 3 laps // bars: marching in place x 8 L/R, f/b high knee walking  x 3 laps, high knee sidesteps 3 laps    Rapid step walking in // bars x 7 laps Rapid stride walking: 4 x 65' Lateral stepping (no hand support) x 6 laps in front of // bars (no hands) Standing calf raises  off back of // bar ramp: 20x, stride calf raises for gait pushoff 2 x 10 L/R  // bar Long stride walking with cues for heel strikin laps. Long stride walking : 2 x 30'      Parallel bar narrow stance walking 10 laps.   Long rapid stride walking: 3 x 65 feet Rapid gait in // bars x 5 laps  Single leg stance 4 x 15-20\" each  L/R        TUG test x 2 Treatment/Session Summary:    Treatment Assessment: Issues with fatigue today, better performance at last treatment. Pt's transfers were more labored, stride length decreased. As a home exercise activity, pt was encouraged to practice single leg stance while holding counter - 15 to 30\" holds each side, 4-5 reps per session. Communication/Consultation:  None today  Equipment provided today:  None  Recommendations/Intent for next treatment session: Next visit will focus on LE strength /coordination with emphasis on RLE reeducation for normal IR /adduction, CKC strength with emphasis  on supine-sit-stand transfers, narrow base /tandem balance, Gait training to increase step length/decrease step width.  .    Total Treatment Billable Duration:  44 minutes  Time In: 0845  Time Out: 0930    Rogers Peon, PT       Charge Capture  }Post Session Pain  PT Visit Info  [facility]  MD Guidelines  Scanned Media  Benefits  [employer]    Future Appointments   Date Time Provider Dot Gusman   10/17/2022  8:00 AM Girish Broussard, PT [facility]   10/19/2022  8:45 AM Girish Broussard, PT [facility]   10/24/2022  9:00 AM José De Leon MD [id number] [facility]   10/25/2022  8:00 AM Rogers Peon, PT [facility]   10/27/2022  8:00 AM Rogers Peon, PT [facility]   11/1/2022  8:45 AM Rogers Peon, PT [facility]   11/3/2022  8:45 AM Rogers Peon, PT [facility]   11/7/2022  9:15 AM [id number] INJECTION/CATH [id number] [facility]   11/8/2022  8:45 AM Rogers Peon, PT [facility]   11/10/2022  8:45 AM Rogers Peon, PT [facility]   11/15/2022  8:45 AM Rogers Peon, PT [facility]   11/17/2022  8:45 AM Rogers Peon, PT [facility]   11/22/2022  8:45 AM Rogers Peon, PT [facility]   11/29/2022  8:45 AM [facility] Quezada   12/2/2022  8:15 AM TRIM LAB RESOURCE TRIM [facility]   12/9/2022  8:30 AM DO ROCHELLE Garcia [facility]

## 2022-10-16 ENCOUNTER — HOSPITAL ENCOUNTER (INPATIENT)
Age: 77
LOS: 8 days | Discharge: NURSING, REHAB OR LONG-TERM CARE | DRG: 556 | End: 2022-10-24
Attending: EMERGENCY MEDICINE | Admitting: INTERNAL MEDICINE
Payer: MEDICARE

## 2022-10-16 ENCOUNTER — APPOINTMENT (OUTPATIENT)
Dept: CT IMAGING | Age: 77
DRG: 556 | End: 2022-10-16
Payer: MEDICARE

## 2022-10-16 ENCOUNTER — APPOINTMENT (OUTPATIENT)
Dept: GENERAL RADIOLOGY | Age: 77
DRG: 556 | End: 2022-10-16
Payer: MEDICARE

## 2022-10-16 DIAGNOSIS — R26.2 UNABLE TO AMBULATE: ICD-10-CM

## 2022-10-16 DIAGNOSIS — R29.898 LEG WEAKNESS, BILATERAL: ICD-10-CM

## 2022-10-16 DIAGNOSIS — E86.0 DEHYDRATION: Primary | ICD-10-CM

## 2022-10-16 DIAGNOSIS — F41.9 ANXIETY: ICD-10-CM

## 2022-10-16 DIAGNOSIS — Z78.9 DECREASED ACTIVITIES OF DAILY LIVING (ADL): ICD-10-CM

## 2022-10-16 DIAGNOSIS — R06.09 EXERTIONAL DYSPNEA: ICD-10-CM

## 2022-10-16 LAB
ALBUMIN SERPL-MCNC: 3 G/DL (ref 3.2–4.6)
ALBUMIN/GLOB SERPL: 0.8 (ref 0.4–1.6)
ALP SERPL-CCNC: 75 U/L (ref 50–136)
ALT SERPL-CCNC: 28 U/L (ref 12–65)
ANION GAP SERPL CALC-SCNC: 4 MMOL/L (ref 2–11)
AST SERPL-CCNC: 16 U/L (ref 15–37)
BASOPHILS # BLD: 0 K/UL (ref 0–0.2)
BASOPHILS NFR BLD: 0 % (ref 0–2)
BILIRUB SERPL-MCNC: 0.3 MG/DL (ref 0.2–1.1)
BUN SERPL-MCNC: 30 MG/DL (ref 8–23)
CALCIUM SERPL-MCNC: 8.5 MG/DL (ref 8.3–10.4)
CHLORIDE SERPL-SCNC: 108 MMOL/L (ref 101–110)
CO2 SERPL-SCNC: 24 MMOL/L (ref 21–32)
CREAT SERPL-MCNC: 1.5 MG/DL (ref 0.8–1.5)
DIFFERENTIAL METHOD BLD: ABNORMAL
EOSINOPHIL # BLD: 0 K/UL (ref 0–0.8)
EOSINOPHIL NFR BLD: 0 % (ref 0.5–7.8)
ERYTHROCYTE [DISTWIDTH] IN BLOOD BY AUTOMATED COUNT: 13.2 % (ref 11.9–14.6)
GLOBULIN SER CALC-MCNC: 3.6 G/DL (ref 2.8–4.5)
GLUCOSE BLD STRIP.AUTO-MCNC: 135 MG/DL (ref 65–100)
GLUCOSE SERPL-MCNC: 105 MG/DL (ref 65–100)
HCT VFR BLD AUTO: 37.6 % (ref 41.1–50.3)
HGB BLD-MCNC: 11.9 G/DL (ref 13.6–17.2)
IMM GRANULOCYTES # BLD AUTO: 0 K/UL (ref 0–0.5)
IMM GRANULOCYTES NFR BLD AUTO: 0 % (ref 0–5)
LYMPHOCYTES # BLD: 0.4 K/UL (ref 0.5–4.6)
LYMPHOCYTES NFR BLD: 4 % (ref 13–44)
MCH RBC QN AUTO: 28.9 PG (ref 26.1–32.9)
MCHC RBC AUTO-ENTMCNC: 31.6 G/DL (ref 31.4–35)
MCV RBC AUTO: 91.3 FL (ref 82–102)
MONOCYTES # BLD: 0.8 K/UL (ref 0.1–1.3)
MONOCYTES NFR BLD: 9 % (ref 4–12)
NEUTS SEG # BLD: 8 K/UL (ref 1.7–8.2)
NEUTS SEG NFR BLD: 87 % (ref 43–78)
NRBC # BLD: 0 K/UL (ref 0–0.2)
PLATELET # BLD AUTO: 208 K/UL (ref 150–450)
PMV BLD AUTO: 8.7 FL (ref 9.4–12.3)
POTASSIUM SERPL-SCNC: 3.9 MMOL/L (ref 3.5–5.1)
PROT SERPL-MCNC: 6.6 G/DL (ref 6.3–8.2)
RBC # BLD AUTO: 4.12 M/UL (ref 4.23–5.6)
SERVICE CMNT-IMP: ABNORMAL
SODIUM SERPL-SCNC: 136 MMOL/L (ref 133–143)
TSH W FREE THYROID IF ABNORMAL: 0.72 UIU/ML (ref 0.36–3.74)
VIT B12 SERPL-MCNC: 1069 PG/ML (ref 193–986)
WBC # BLD AUTO: 9.3 K/UL (ref 4.3–11.1)

## 2022-10-16 PROCEDURE — 2580000003 HC RX 258: Performed by: HOSPITALIST

## 2022-10-16 PROCEDURE — 1100000000 HC RM PRIVATE

## 2022-10-16 PROCEDURE — 84443 ASSAY THYROID STIM HORMONE: CPT

## 2022-10-16 PROCEDURE — 2580000003 HC RX 258: Performed by: EMERGENCY MEDICINE

## 2022-10-16 PROCEDURE — 71046 X-RAY EXAM CHEST 2 VIEWS: CPT

## 2022-10-16 PROCEDURE — 85025 COMPLETE CBC W/AUTO DIFF WBC: CPT

## 2022-10-16 PROCEDURE — 6370000000 HC RX 637 (ALT 250 FOR IP): Performed by: HOSPITALIST

## 2022-10-16 PROCEDURE — 82607 VITAMIN B-12: CPT

## 2022-10-16 PROCEDURE — 70450 CT HEAD/BRAIN W/O DYE: CPT

## 2022-10-16 PROCEDURE — 99285 EMERGENCY DEPT VISIT HI MDM: CPT | Performed by: EMERGENCY MEDICINE

## 2022-10-16 PROCEDURE — 82962 GLUCOSE BLOOD TEST: CPT

## 2022-10-16 PROCEDURE — 80053 COMPREHEN METABOLIC PANEL: CPT

## 2022-10-16 RX ORDER — ACETAMINOPHEN 325 MG/1
650 TABLET ORAL EVERY 6 HOURS PRN
Status: DISCONTINUED | OUTPATIENT
Start: 2022-10-16 | End: 2022-10-24 | Stop reason: HOSPADM

## 2022-10-16 RX ORDER — SODIUM CHLORIDE 0.9 % (FLUSH) 0.9 %
5-40 SYRINGE (ML) INJECTION EVERY 12 HOURS SCHEDULED
Status: DISCONTINUED | OUTPATIENT
Start: 2022-10-16 | End: 2022-10-24 | Stop reason: HOSPADM

## 2022-10-16 RX ORDER — OLANZAPINE 5 MG/1
7.5 TABLET ORAL NIGHTLY
Status: DISCONTINUED | OUTPATIENT
Start: 2022-10-16 | End: 2022-10-24 | Stop reason: HOSPADM

## 2022-10-16 RX ORDER — ACETAMINOPHEN 650 MG/1
650 SUPPOSITORY RECTAL EVERY 6 HOURS PRN
Status: DISCONTINUED | OUTPATIENT
Start: 2022-10-16 | End: 2022-10-24 | Stop reason: HOSPADM

## 2022-10-16 RX ORDER — CLONAZEPAM 1 MG/1
6 TABLET ORAL NIGHTLY PRN
Status: DISCONTINUED | OUTPATIENT
Start: 2022-10-16 | End: 2022-10-24 | Stop reason: HOSPADM

## 2022-10-16 RX ORDER — ONDANSETRON 2 MG/ML
4 INJECTION INTRAMUSCULAR; INTRAVENOUS EVERY 6 HOURS PRN
Status: DISCONTINUED | OUTPATIENT
Start: 2022-10-16 | End: 2022-10-24 | Stop reason: HOSPADM

## 2022-10-16 RX ORDER — ONDANSETRON 8 MG/1
4 TABLET, ORALLY DISINTEGRATING ORAL EVERY 8 HOURS PRN
Status: DISCONTINUED | OUTPATIENT
Start: 2022-10-16 | End: 2022-10-24 | Stop reason: HOSPADM

## 2022-10-16 RX ORDER — SODIUM CHLORIDE 0.9 % (FLUSH) 0.9 %
5-40 SYRINGE (ML) INJECTION PRN
Status: DISCONTINUED | OUTPATIENT
Start: 2022-10-16 | End: 2022-10-24 | Stop reason: HOSPADM

## 2022-10-16 RX ORDER — CARVEDILOL 6.25 MG/1
6.25 TABLET ORAL 2 TIMES DAILY WITH MEALS
Status: DISCONTINUED | OUTPATIENT
Start: 2022-10-16 | End: 2022-10-24 | Stop reason: HOSPADM

## 2022-10-16 RX ORDER — OXYBUTYNIN CHLORIDE 10 MG/1
10 TABLET, EXTENDED RELEASE ORAL DAILY
Status: DISCONTINUED | OUTPATIENT
Start: 2022-10-17 | End: 2022-10-17

## 2022-10-16 RX ORDER — ENOXAPARIN SODIUM 100 MG/ML
40 INJECTION SUBCUTANEOUS DAILY
Status: DISCONTINUED | OUTPATIENT
Start: 2022-10-17 | End: 2022-10-24 | Stop reason: HOSPADM

## 2022-10-16 RX ORDER — 0.9 % SODIUM CHLORIDE 0.9 %
1000 INTRAVENOUS SOLUTION INTRAVENOUS
Status: COMPLETED | OUTPATIENT
Start: 2022-10-16 | End: 2022-10-16

## 2022-10-16 RX ORDER — LEVOTHYROXINE SODIUM 0.12 MG/1
125 TABLET ORAL DAILY
Status: DISCONTINUED | OUTPATIENT
Start: 2022-10-17 | End: 2022-10-24 | Stop reason: HOSPADM

## 2022-10-16 RX ORDER — MAGNESIUM SULFATE IN WATER 40 MG/ML
2000 INJECTION, SOLUTION INTRAVENOUS PRN
Status: DISCONTINUED | OUTPATIENT
Start: 2022-10-16 | End: 2022-10-24 | Stop reason: HOSPADM

## 2022-10-16 RX ORDER — SODIUM CHLORIDE 9 MG/ML
INJECTION, SOLUTION INTRAVENOUS PRN
Status: DISCONTINUED | OUTPATIENT
Start: 2022-10-16 | End: 2022-10-24 | Stop reason: HOSPADM

## 2022-10-16 RX ORDER — POLYETHYLENE GLYCOL 3350 17 G/17G
17 POWDER, FOR SOLUTION ORAL DAILY PRN
Status: DISCONTINUED | OUTPATIENT
Start: 2022-10-16 | End: 2022-10-24 | Stop reason: HOSPADM

## 2022-10-16 RX ORDER — AMITRIPTYLINE HYDROCHLORIDE 50 MG/1
100 TABLET, FILM COATED ORAL NIGHTLY
Status: DISCONTINUED | OUTPATIENT
Start: 2022-10-16 | End: 2022-10-17

## 2022-10-16 RX ORDER — FAMOTIDINE 20 MG/1
10 TABLET, FILM COATED ORAL DAILY
Status: DISCONTINUED | OUTPATIENT
Start: 2022-10-17 | End: 2022-10-24 | Stop reason: HOSPADM

## 2022-10-16 RX ADMIN — CLONAZEPAM 6 MG: 1 TABLET ORAL at 22:23

## 2022-10-16 RX ADMIN — AMITRIPTYLINE HYDROCHLORIDE 100 MG: 50 TABLET, FILM COATED ORAL at 22:20

## 2022-10-16 RX ADMIN — SODIUM CHLORIDE, PRESERVATIVE FREE 10 ML: 5 INJECTION INTRAVENOUS at 22:24

## 2022-10-16 RX ADMIN — CARVEDILOL 6.25 MG: 6.25 TABLET, FILM COATED ORAL at 16:49

## 2022-10-16 RX ADMIN — SODIUM CHLORIDE 1000 ML: 900 INJECTION, SOLUTION INTRAVENOUS at 08:56

## 2022-10-16 RX ADMIN — OLANZAPINE 7.5 MG: 5 TABLET, FILM COATED ORAL at 22:20

## 2022-10-16 ASSESSMENT — PAIN - FUNCTIONAL ASSESSMENT: PAIN_FUNCTIONAL_ASSESSMENT: NONE - DENIES PAIN

## 2022-10-16 NOTE — PROGRESS NOTES
Admission complete except Home Med List; pt does not have list and will have to get/bring list for admission to be completed.

## 2022-10-16 NOTE — PLAN OF CARE
Problem: Discharge Planning  Goal: Discharge to home or other facility with appropriate resources  Outcome: Progressing  Flowsheets (Taken 10/16/2022 [id number])  Discharge to home or other facility with appropriate resources: Identify barriers to discharge with patient and caregiver

## 2022-10-16 NOTE — H&P
Hospitalist History and Physical   Admit Date:  10/16/2022  8:25 AM   Name:  Kiran Nolasco   Age:  76 y.o. Sex:  male  :  1945   MRN:  [id number]   Room:  [facility]    Presenting Complaint: Fatigue     Reason(s) for Admission: Dehydration [E86.0]  Exertional dyspnea [R06.09]  Leg weakness, bilateral [R29.898]  Unable to ambulate [R26.2]  Decreased activities of daily living (ADL) [Z78.9]     History of Present Illness:   Milo Wahl is a 76 y.o. 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 Patient states that he feels so weak that his legs gave out and is having balance issues with walking. Patient denies any fever chills chest pain cough shortness of breath. No palpitations. No nausea no vomiting no diarrhea. No abdominal pain. He denies any falls. ER course  Patient is afebrile, hemodynamically stable. ER physician reported that the patient's oxygen saturation dropped to 88% on room air and was placed on nasal cannula. CT head on admission negative. Patient admitted for further evaluation management as he is unable to ambulate without balance issues. Review of Systems:  10 systems reviewed and negative except as noted in HPI. Assessment & Plan:     Principal Problem: This is a 68-year-old male with:     Bilateral lower extremity weakness and difficulty ambulation  Patient reports  Weakness bilaterally but is mainly concerned about balance issues with walking. CT head negative. Obtain MRI brain. May need MRI of the cervical thoracic and lumbar spine but will await neurology evaluation. Check B12.     Rectal cancer s/p surgery with colostomy    Hypothyroidism  Continue levothyroxine. Check TSH, free T4. Anticipated discharge needs:   PT/OT, PPD. May need rehab. Diet: No diet orders on file  VTE ppx: Lovenox  Code status: Prior    Hospital Problems:  Principal Problem:    Leg weakness, bilateral  Active Problems:    Rectal cancer (HCC)    Hypothyroidism  Resolved Problems:    * No resolved hospital problems.  *       Past History:     Past Medical History:   Diagnosis Date    Acute deep vein thrombosis (DVT) of non-extremity vein 5/20/2019    Family history of malignant neoplasm of gastrointestinal tract     mother colon/ovarian cancer 67    Fecal incontinence     LAR syndrome    John catheter in place 2022    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    surgery and radiation    Psychiatric disorder     anxiety       Past Surgical History:   Procedure Laterality Date    COLONOSCOPY  last 2/3/15    Roberto--no polyps--3 year recall    COLONOSCOPY N/A 3/5/2021    COLONOSCOPY/BMI 19 performed by Raulito Rodriguez MD at [facility] ENDOSCOPY    COLONOSCOPY N/A 2/12/2018    COLONOSCOPY / BMI=21 performed by Lobo Nichols MD at [place] [address]  2/12/2018         COLONOSCOPY THRU STOMA,JEFF LOGANVKEVON W/SNARE  3/5/2021         COLOSTOMY  5/11/16    CT RETROPERITONEAL PERC DRAIN  5/13/2021    CT RETROPERITONEAL PERC DRAIN 5/13/2021 [facility] CT SCAN    CT RETROPERITONEAL PERC DRAIN  5/20/2021    CT RETROPERITONEAL PERC DRAIN 5/20/2021 [facility] CT SCAN    GI  2016    Sacral nerve stimlator lead trial (unsucessful) and removal    HERNIA REPAIR      and Colostomy in May    HERNIA REPAIR  3/2015, 2016    IR ABSCESS EVAL THRU EXISTING CATH  2021    IR ABSCESS EVAL THRU EXISTING CATH  2021    IR ABSCESS EVAL THRU EXISTING CATH  2021    IR NEPHROSTOGRAM EXISTING ACCESS  12/10/2021    IR NEPHROSTOGRAM EXISTING ACCESS  10/4/2021    IR NEPHROSTOMY EXCHANGE CATHETER  2021    IR NEPHROSTOMY EXCHANGE CATHETER  11/3/2021    IR NEPHROSTOMY EXCHANGE CATHETER  10/28/2021    IR NEPHROSTOMY PERCUTANEOUS LEFT  2021    IR NEPHROSTOMY PERCUTANEOUS LEFT  2021    IR NEPHROSTOMY PERCUTANEOUS LEFT 2021 [facility]    IR REPLCE T CVC WO PORT SAME ACC  2019    IR TUBE CHANGE  2021    IR TUBE CHANGE  2021    IR TUBE CHANGE  2021    IR TUBE CHANGE  2021    IR TUNNELED CATHETER PLACEMENT GREATER THAN 5 YEARS  2019    IR TUNNELED CATHETER PLACEMENT GREATER THAN 5 YEARS  2019    IR TUNNELED CATHETER PLACEMENT GREATER THAN 5 YEARS 2019 [facility]    OTHER SURGICAL HISTORY Bilateral     cataracts      OTHER SURGICAL HISTORY  10/7/2013    Roberto---endorectal us    POLYPECTOMY      TOTAL COLECTOMY  2013    Roberto--lap LAR with coloproctostomy, mobilization splenic flexure, diverting loop ileostomy    TOTAL COLECTOMY Right 2014    for leak    VASCULAR SURGERY Left     single lumen port/subsequently removed        Social History     Tobacco Use    Smoking status: Former     Packs/day: 1.00     Types: Cigarettes     Quit date: 1963     Years since quittin.9    Smokeless tobacco: Never   Substance Use Topics    Alcohol use: Not Currently     Alcohol/week: 11.7 standard drinks      Social History     Substance and Sexual Activity   Drug Use No       Family History   Problem Relation Age of Onset    Cancer Mother         colon and ovarian    Cancer Brother prostate    Alcohol Abuse Brother     Cancer Maternal Grandmother         bone    Alcohol Abuse Father     Stroke Paternal Grandfather     Alcohol Abuse Sister         Immunization History   Administered Date(s) Administered    COVID-19, PFIZER PURPLE top, DILUTE for use, (age 15 y+), 30mcg/0.3mL 02/15/2021, 03/08/2021, 10/25/2021    PPD Test 01/28/2014, 08/24/2014, 09/09/2014, 11/19/2015, 05/23/2016, 03/25/2019, 04/23/2019, 09/24/2021, 09/27/2021, 02/24/2022, 06/27/2022     No Known Allergies  Prior to Admit Medications:  Current Outpatient Medications   Medication Instructions    acetaminophen (TYLENOL) 500 mg, Oral, EVERY 6 HOURS PRN    amitriptyline (ELAVIL) 100 mg, Oral, NIGHTLY    carvedilol (COREG) 6.25 mg, Oral, 2 TIMES DAILY WITH MEALS    clonazePAM (KLONOPIN) 4 mg, Oral, NIGHTLY PRN    clonazePAM (KLONOPIN) 6 mg, Oral, NIGHTLY PRN    famotidine (PEPCID) 10 mg, Oral, DAILY    Hyoscyamine Sulfate SL (LEVSIN/SL) 0.125 MG SUBL 1 tablet, SubLINGual, EVERY 6 HOURS PRN    ibuprofen (ADVIL;MOTRIN) 600 mg, Oral, EVERY 6 HOURS PRN    levothyroxine (SYNTHROID) 125 MCG tablet TAKE ONE TABLET BY MOUTH ONE TIME DAILY BEFORE BREAKFAST    OLANZapine (ZYPREXA) 7.5 mg, Oral, NIGHTLY    oxybutynin (DITROPAN-XL) 10 mg, Oral, DAILY         Objective:   Patient Vitals for the past 24 hrs:   Temp Pulse Resp BP SpO2   10/16/22 1138 -- -- -- -- 99 %   10/16/22 1023 -- -- -- -- (!) 88 %   10/16/22 0801 98 °F (36.7 °C) 86 18 109/67 97 %       Oxygen Therapy  SpO2: 99 %  O2 Device: None (Room air)    Estimated body mass index is 18.73 kg/m² as calculated from the following:    Height as of this encounter: 5' 8.5\" (1.74 m). Weight as of this encounter: 125 lb (56.7 kg). No intake or output data in the 24 hours ending 10/16/22 1523      Physical Exam:    Blood pressure 109/67, pulse 86, temperature 98 °F (36.7 °C), temperature source Oral, resp. rate 18, height 5' 8.5\" (1.74 m), weight 125 lb (56.7 kg), SpO2 99 %.   General: Elderly male, alert, awake, well nourished. Overt distress,  Head:  Normocephalic, atraumatic  Eyes:  Sclerae appear normal.  Pupils equally round. ENT:  Nares appear normal, no drainage. Moist oral mucosa  Neck:  No restricted ROM. Trachea midline   CV:   RRR. No m/r/g. No jugular venous distension. Lungs:   CTAB. No wheezing, rhonchi, or rales. Symmetric expansion. Abdomen: Bowel sounds present. Soft, nontender, nondistended. Extremities: No cyanosis or clubbing. No edema  Skin:     No rashes and normal coloration. Warm and dry. Neuro:  CN II-XII grossly intact. Gait  imbalance, sensation intact. A&Ox3  Psych:  Flat mood and affect.       I have personally reviewed labs and tests showing:  Recent Labs:  Recent Results (from the past 24 hour(s))   CBC with Auto Differential    Collection Time: 10/16/22  8:16 AM   Result Value Ref Range    WBC 9.3 4.3 - 11.1 K/uL    RBC 4.12 (L) 4.23 - 5.6 M/uL    Hemoglobin 11.9 (L) 13.6 - 17.2 g/dL    Hematocrit 37.6 (L) 41.1 - 50.3 %    MCV 91.3 82 - 102 FL    MCH 28.9 26.1 - 32.9 PG    MCHC 31.6 31.4 - 35.0 g/dL    RDW 13.2 11.9 - 14.6 %    Platelets 642 393 - 290 K/uL    MPV 8.7 (L) 9.4 - 12.3 FL    nRBC 0.00 0.0 - 0.2 K/uL    Differential Type AUTOMATED      Seg Neutrophils 87 (H) 43 - 78 %    Lymphocytes 4 (L) 13 - 44 %    Monocytes 9 4.0 - 12.0 %    Eosinophils % 0 (L) 0.5 - 7.8 %    Basophils 0 0.0 - 2.0 %    Immature Granulocytes 0 0.0 - 5.0 %    Segs Absolute 8.0 1.7 - 8.2 K/UL    Absolute Lymph # 0.4 (L) 0.5 - 4.6 K/UL    Absolute Mono # 0.8 0.1 - 1.3 K/UL    Absolute Eos # 0.0 0.0 - 0.8 K/UL    Basophils Absolute 0.0 0.0 - 0.2 K/UL    Absolute Immature Granulocyte 0.0 0.0 - 0.5 K/UL   Comprehensive Metabolic Panel    Collection Time: 10/16/22  8:16 AM   Result Value Ref Range    Sodium 136 133 - 143 mmol/L    Potassium 3.9 3.5 - 5.1 mmol/L    Chloride 108 101 - 110 mmol/L    CO2 24 21 - 32 mmol/L    Anion Gap 4 2 - 11 mmol/L    Glucose 105 (H) 65 - 100 mg/dL    BUN 30 (H) 8 - 23 MG/DL    Creatinine 1.50 0.8 - 1.5 MG/DL    Est, Glom Filt Rate 47 (L) >60 ml/min/1.73m2    Calcium 8.5 8.3 - 10.4 MG/DL    Total Bilirubin 0.3 0.2 - 1.1 MG/DL    ALT 28 12 - 65 U/L    AST 16 15 - 37 U/L    Alk Phosphatase 75 50 - 136 U/L    Total Protein 6.6 6.3 - 8.2 g/dL    Albumin 3.0 (L) 3.2 - 4.6 g/dL    Globulin 3.6 2.8 - 4.5 g/dL    Albumin/Globulin Ratio 0.8 0.4 - 1.6         I have personally reviewed imaging studies showing:  XR CHEST (2 VW)    Result Date: 10/16/2022  Frontal and lateral views of the chest 10/16/2022 Comparison: 6/21/2022 Indication: Fatigue FINDINGS: Cardiac and pulmonary artery enlargement consistent with some degree of underlying pulmonary arterial hypertension. There is no focal pulmonary infiltrate, nodule, effusion, or pneumothorax. No discrete acute osseous lesion seen. No acute cardiopulmonary process. CT HEAD WO CONTRAST    Result Date: 10/16/2022  HEAD CT WITHOUT CONTRAST  10/16/2022 HISTORY:   ataxia  several days of bilateral leg weakness. TECHNIQUE: Noncontrast axial images were obtained through the brain. All CT scans at this facility used dose modulation, interactive reconstruction and/or weight based dosing when appropriate to reduce radiation dose to as low as reasonably achievable. COMPARISON: None FINDINGS: There is no acute intracranial hemorrhage, significant mass effect or CT evidence of acute large artery territorial infarction. Please note that a hyperacute infarct or small vessel infarct may not be apparent on initial CT imaging. Scattered areas of decreased attenuation within the supratentorial white matter suggest changes of chronic small vessel ischemic disease. Mild cerebral volume loss is present. There is no hydrocephalus , intra-axial mass or abnormal extra-axial fluid collection. There are no displaced skull fractures. The mastoid air cells and paranasal sinuses are clear where imaged.      No acute findings Echocardiogram:  No results found for this or any previous visit. Orders Placed This Encounter   Medications    0.9 % sodium chloride bolus         Signed:  Alexander Hankins MD    Part of this note may have been written by using a voice dictation software. The note has been proof read but may still contain some grammatical/other typographical errors.

## 2022-10-16 NOTE — PROGRESS NOTES
Report rec'd from [facility] approx [id number]. Pt has arrived and has urostomy & colostomy, no BP taken since 0800 today, Charge nurse notified prior to arrival of BP. Assessment in progress.

## 2022-10-16 NOTE — ED TRIAGE NOTES
Patient arrives to ED via EMS from home. Patient reports bilateral leg weakness x few days. Patient reports he has been close to falling multiple times so that is why he called 911. Denies n/v/d. Denies chest pain. Denies SOB.

## 2022-10-16 NOTE — ED PROVIDER NOTES
Emergency Department Provider Note                   PCP:                Susan Odell DO               Age: 76 y.o. Sex: male       ICD-10-CM    1. Dehydration  E86.0       2. Exertional dyspnea  R06.09       3. Decreased activities of daily living (ADL)  Z78.9       4. Unable to ambulate  R26.2           DISPOSITION Decision To Admit 10/16/2022 12:08:05 PM       MDM  Number of Diagnoses or Management Options  Decreased activities of daily living (ADL)  Dehydration  Exertional dyspnea  Unable to ambulate  Diagnosis management comments: Renal failure, dehydration, electrolyte abnormality, UTI, pneumonia,       Amount and/or Complexity of Data Reviewed  Clinical lab tests: ordered and reviewed  Tests in the radiology section of CPT®: ordered and reviewed  Tests in the medicine section of CPT®: reviewed and ordered  Review and summarize past medical records: yes  Independent visualization of images, tracings, or specimens: yes         ED Course as of 10/16/22 1211   Sun Oct 16, 2022   1053 Ambulatory trial was attempted here in the emergency department. Patient did not do well with this. He was very unstable on his feet and had to be assisted by staff. Patient became visibly short of breath and when placed back in the bed his oxygen saturation dropped to 88%. He was placed on supplemental oxygen [KH]   1207 CT HEAD WO CONTRAST  IMPRESSION:  No acute findings [KH]   1210 I talked to the hospitalist about admission secondary to the patient's inability to stand and ambulate on his own which is acutely new. [IK]   4143 I talked to the patient about the need for admission and while he is reluctant understands and is agreeable with this plan.  [KH]      ED Course User Index  [KH] Cisco Spivey DO        Orders Placed This Encounter   Procedures    XR CHEST (2 VW)    CT HEAD WO CONTRAST    CBC with Auto Differential    Comprehensive Metabolic Panel    POCT Urine Dipstick        Medications   0.9 % sodium chloride bolus (1,000 mLs IntraVENous New Bag 10/16/22 0856)       New Prescriptions    No medications on file        Santiago Puentes is a 76 y.o. male who presents to the Emergency Department with chief complaint of    Chief Complaint   Patient presents with    Fatigue      Patient is a 55-year-old male presenting to the emerged department today complaining of increasing fatigue over the last week. Patient states that over the last couple of days he has felt so weak like his legs are going to give out and he will hold the ground. The patient has not had any syncopal events or falls. The patient states this happened to him 1 other time when his calcium was very high. 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 He denies any fevers or chills. The patient states \"there is just really no reason for this I been doing everything I am supposed to. \"        All other systems reviewed and are negative unless otherwise stated in the history of present illness section. Review of Systems   Constitutional:  Positive for fatigue. Neurological:  Positive for weakness. All other systems reviewed and are negative.     Past Medical History:   Diagnosis Date    Acute deep vein thrombosis (DVT) of non-extremity vein 5/20/2019    Family history of malignant neoplasm of gastrointestinal tract     mother colon/ovarian cancer 67    Fecal incontinence     LAR syndrome    John catheter in place 2022    patient stated- \"catheter due to them nicking his bladder and it won't heal\"    Former cigarette smoker     History of rectal cancer     Hypothyroid     stable w/med    Infection and inflammatory reaction due to other internal prosthetic devices, implants and grafts, subsequent encounter 2017    abd wound/mesh colostomy    Insomnia     takes meds    Major depression Osteoarthritis, hand     Personal history of colonic polyps 9/2013    x 1    Personal history of malignant neoplasm of rectum, rectosigmoid junction, 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    surgery and radiation    Psychiatric disorder     anxiety        Past Surgical History:   Procedure Laterality Date    COLONOSCOPY  last 2/3/15    Roberto--no polyps--3 year recall    COLONOSCOPY N/A 3/5/2021    COLONOSCOPY/BMI 19 performed by Tangela Medina MD at [facility] ENDOSCOPY    COLONOSCOPY N/A 2/12/2018    COLONOSCOPY / BMI=21 performed by Bibi Sawyer MD at [place] [id number]  2/12/2018         COLONOSCOPY THRU STOMA,LESN RMVL W/SNARE  3/5/2021         COLOSTOMY  5/11/16    CT RETROPERITONEAL PERC DRAIN  5/13/2021    CT RETROPERITONEAL PERC DRAIN 5/13/2021 [facility] CT SCAN    CT RETROPERITONEAL PERC DRAIN  5/20/2021    CT RETROPERITONEAL PERC DRAIN 5/20/2021 [facility] CT SCAN    GI  4/2016    Sacral nerve stimlator lead trial (unsucessful) and removal    HERNIA REPAIR      and Colostomy in May    HERNIA REPAIR  3/2015, 5/2016    IR ABSCESS EVAL THRU EXISTING CATH  11/24/2021    IR ABSCESS EVAL THRU EXISTING CATH  6/4/2021    IR ABSCESS EVAL THRU EXISTING CATH  5/20/2021    IR NEPHROSTOGRAM EXISTING ACCESS  12/10/2021    IR NEPHROSTOGRAM EXISTING ACCESS  10/4/2021    IR NEPHROSTOMY EXCHANGE CATHETER  12/7/2021    IR NEPHROSTOMY EXCHANGE CATHETER  11/3/2021    IR NEPHROSTOMY EXCHANGE CATHETER  10/28/2021    IR NEPHROSTOMY PERCUTANEOUS LEFT  9/16/2021    IR NEPHROSTOMY PERCUTANEOUS LEFT  9/16/2021    IR NEPHROSTOMY PERCUTANEOUS LEFT 9/16/2021 [facility]    IR REPLCE T CVC WO PORT SAME ACC  5/30/2019    IR TUBE CHANGE  8/5/2021    IR TUBE CHANGE  7/8/2021    IR TUBE CHANGE  6/14/2021    IR TUBE CHANGE  5/27/2021    IR TUNNELED CATHETER PLACEMENT GREATER THAN 5 YEARS  5/20/2019    IR TUNNELED CATHETER PLACEMENT GREATER THAN 5 YEARS  5/20/2019    IR TUNNELED CATHETER PLACEMENT GREATER THAN 5 YEARS 2019 [facility] SPE[facility]    OTHER SURGICAL HISTORY Bilateral     cataracts  2017    OTHER SURGICAL HISTORY  10/7/2013    Roberto---endorectal us    POLYPECTOMY      TOTAL COLECTOMY  2013    Roberto--lap LAR with coloproctostomy, mobilization splenic flexure, diverting loop ileostomy    TOTAL COLECTOMY Right 2014    for leak    VASCULAR SURGERY Left     single lumen port/subsequently removed        Family History   Problem Relation Age of Onset    Cancer Mother         colon and ovarian    Cancer Brother         prostate    Alcohol Abuse Brother     Cancer Maternal Grandmother         bone    Alcohol Abuse Father     Stroke Paternal Grandfather     Alcohol Abuse Sister         Social History     Socioeconomic History    Marital status: Single   Tobacco Use    Smoking status: Former     Packs/day: 1.00     Types: Cigarettes     Quit date: 1963     Years since quittin.9    Smokeless tobacco: Never   Substance and Sexual Activity    Alcohol use: Not Currently     Alcohol/week: 11.7 standard drinks    Drug use: No        Allergies: Patient has no known allergies. Previous Medications    ACETAMINOPHEN (TYLENOL) 500 MG TABLET    Take 500 mg by mouth every 6 hours as needed for Pain    AMITRIPTYLINE (ELAVIL) 100 MG TABLET    Take 100 mg by mouth nightly     CARVEDILOL (COREG) 6.25 MG TABLET    Take 1 tablet by mouth 2 times daily (with meals)    CLONAZEPAM (KLONOPIN) 2 MG TABLET    Take 2 tablets by mouth nightly as needed for Anxiety for up to 3 days. CLONAZEPAM (KLONOPIN) 2 MG TABLET    Take 6 mg by mouth nightly as needed (sleep).     FAMOTIDINE (PEPCID) 10 MG TABLET    Take 1 tablet by mouth daily    HYOSCYAMINE SULFATE SL (LEVSIN/SL) 0.125 MG SUBL    Place 1 tablet under the tongue every 6 hours as needed (bladder spasm)    IBUPROFEN (ADVIL;MOTRIN) 600 MG TABLET    Take 600 mg by mouth every 6 hours as needed for Pain    LEVOTHYROXINE (SYNTHROID) 125 MCG TABLET    TAKE ONE TABLET BY MOUTH ONE TIME DAILY BEFORE BREAKFAST    OLANZAPINE (ZYPREXA) 2.5 MG TABLET    Take 7.5 mg by mouth nightly    OXYBUTYNIN (DITROPAN-XL) 10 MG EXTENDED RELEASE TABLET    Take 10 mg by mouth daily         Vitals signs and nursing note reviewed. Patient Vitals for the past 4 hrs:   SpO2   10/16/22 1138 99 %   10/16/22 1023 (!) 88 %          Physical Exam     GENERAL:The patient has Body mass index is 18.73 kg/m². dehydrated. VITAL SIGNS: Heart rate, blood pressure, respiratory rate reviewed as recorded in  nurse's notes  EYES: Pupils reactive. Extraocular motion intact. No conjunctival redness or drainage. NOSE: No nasal drainage or epistaxis. MOUTH/THROAT: Patient's lips are excessively dry and there is decreased moisture in the mucous membranes. Posterior pharynx is clear. Floor the mouth is soft. NECK: Supple, no meningeal signs. Trachea midline. No masses or thyromegaly. LUNGS: Breath sounds clear and equal bilaterally no accessory muscle use. CHEST: No deformity  CARDIOVASCULAR: Regular rate and rhythm  ABDOMEN: Soft without tenderness. No palpable masses or organomegaly. No  peritoneal signs. No rigidity. EXTREMITIES: No clubbing or cyanosis. No joint swelling. Normal muscle tone. No  restricted range of motion appreciated. NEUROLOGIC: Sensation is grossly intact. Cranial nerve exam reveals face is  symmetrical, tongue is midline speech is clear. SKIN: No rash or petechiae. Decreased skin turgor palpated. PSYCHIATRIC: Alert and oriented. Appropriate behavior and judgment. Procedures      Results for orders placed or performed during the hospital encounter of 10/16/22   XR CHEST (2 VW)    Narrative    Frontal and lateral views of the chest 10/16/2022    Comparison: 6/21/2022    Indication: Fatigue    FINDINGS: Cardiac and pulmonary artery enlargement consistent with some degree  of underlying pulmonary arterial hypertension.  There is no focal pulmonary  infiltrate, nodule, effusion, or pneumothorax. No discrete acute osseous lesion  seen. Impression    No acute cardiopulmonary process. CT HEAD WO CONTRAST    Narrative    HEAD CT WITHOUT CONTRAST  10/16/2022     HISTORY:   ataxia  several days of bilateral leg weakness. TECHNIQUE: Noncontrast axial images were obtained through the brain. All CT  scans at this facility used dose modulation, interactive reconstruction and/or  weight based dosing when appropriate to reduce radiation dose to as low as  reasonably achievable. COMPARISON: None    FINDINGS: There is no acute intracranial hemorrhage, significant mass effect or  CT evidence of acute large artery territorial infarction. Please note that a  hyperacute infarct or small vessel infarct may not be apparent on initial CT  imaging. Scattered areas of decreased attenuation within the supratentorial white matter  suggest changes of chronic small vessel ischemic disease. Mild cerebral volume  loss is present. There is no hydrocephalus , intra-axial mass or abnormal extra-axial fluid  collection. There are no displaced skull fractures. The mastoid air cells and  paranasal sinuses are clear where imaged.       Impression    No acute findings     CBC with Auto Differential   Result Value Ref Range    WBC 9.3 4.3 - 11.1 K/uL    RBC 4.12 (L) 4.23 - 5.6 M/uL    Hemoglobin 11.9 (L) 13.6 - 17.2 g/dL    Hematocrit 37.6 (L) 41.1 - 50.3 %    MCV 91.3 82 - 102 FL    MCH 28.9 26.1 - 32.9 PG    MCHC 31.6 31.4 - 35.0 g/dL    RDW 13.2 11.9 - 14.6 %    Platelets 670 116 - 145 K/uL    MPV 8.7 (L) 9.4 - 12.3 FL    nRBC 0.00 0.0 - 0.2 K/uL    Differential Type AUTOMATED      Seg Neutrophils 87 (H) 43 - 78 %    Lymphocytes 4 (L) 13 - 44 %    Monocytes 9 4.0 - 12.0 %    Eosinophils % 0 (L) 0.5 - 7.8 %    Basophils 0 0.0 - 2.0 %    Immature Granulocytes 0 0.0 - 5.0 %    Segs Absolute 8.0 1.7 - 8.2 K/UL    Absolute Lymph # 0.4 (L) 0.5 - 4.6 K/UL    Absolute Mono # 0.8 0.1 - 1.3 K/UL    Absolute Eos # 0.0 0.0 - 0.8 K/UL    Basophils Absolute 0.0 0.0 - 0.2 K/UL    Absolute Immature Granulocyte 0.0 0.0 - 0.5 K/UL   Comprehensive Metabolic Panel   Result Value Ref Range    Sodium 136 133 - 143 mmol/L    Potassium 3.9 3.5 - 5.1 mmol/L    Chloride 108 101 - 110 mmol/L    CO2 24 21 - 32 mmol/L    Anion Gap 4 2 - 11 mmol/L    Glucose 105 (H) 65 - 100 mg/dL    BUN 30 (H) 8 - 23 MG/DL    Creatinine 1.50 0.8 - 1.5 MG/DL    Est, Glom Filt Rate 47 (L) >60 ml/min/1.73m2    Calcium 8.5 8.3 - 10.4 MG/DL    Total Bilirubin 0.3 0.2 - 1.1 MG/DL    ALT 28 12 - 65 U/L    AST 16 15 - 37 U/L    Alk Phosphatase 75 50 - 136 U/L    Total Protein 6.6 6.3 - 8.2 g/dL    Albumin 3.0 (L) 3.2 - 4.6 g/dL    Globulin 3.6 2.8 - 4.5 g/dL    Albumin/Globulin Ratio 0.8 0.4 - 1.6          CT HEAD WO CONTRAST   Final Result   No acute findings         XR CHEST (2 VW)   Final Result   No acute cardiopulmonary process. Voice dictation software was used during the making of this note. This software is not perfect and grammatical and other typographical errors may be present. This note has not been completely proofread for errors.        Janneth Holland DO  10/16/22 4410

## 2022-10-16 NOTE — ED NOTES
Pt up to ambulate per md order. Pt remains with unsteady gait, reports dizziness, 02 sat drop to 88%.  Md notified and aware     Nikole Rossi RN  10/16/22 1024

## 2022-10-17 PROBLEM — Z78.9 DECREASED ACTIVITIES OF DAILY LIVING (ADL): Status: ACTIVE | Noted: 2019-05-19

## 2022-10-17 PROBLEM — R26.2 UNABLE TO AMBULATE: Status: ACTIVE | Noted: 2022-10-17

## 2022-10-17 LAB
ALBUMIN SERPL-MCNC: 2.6 G/DL (ref 3.2–4.6)
ALBUMIN/GLOB SERPL: 0.8 (ref 0.4–1.6)
ALP SERPL-CCNC: 69 U/L (ref 50–136)
ALT SERPL-CCNC: 24 U/L (ref 12–65)
ANION GAP SERPL CALC-SCNC: 5 MMOL/L (ref 2–11)
AST SERPL-CCNC: 16 U/L (ref 15–37)
BASOPHILS # BLD: 0 K/UL (ref 0–0.2)
BASOPHILS NFR BLD: 1 % (ref 0–2)
BILIRUB SERPL-MCNC: 0.4 MG/DL (ref 0.2–1.1)
BUN SERPL-MCNC: 20 MG/DL (ref 8–23)
CALCIUM SERPL-MCNC: 8.5 MG/DL (ref 8.3–10.4)
CHLORIDE SERPL-SCNC: 110 MMOL/L (ref 101–110)
CO2 SERPL-SCNC: 24 MMOL/L (ref 21–32)
CREAT SERPL-MCNC: 1 MG/DL (ref 0.8–1.5)
DIFFERENTIAL METHOD BLD: ABNORMAL
EOSINOPHIL # BLD: 0.1 K/UL (ref 0–0.8)
EOSINOPHIL NFR BLD: 1 % (ref 0.5–7.8)
ERYTHROCYTE [DISTWIDTH] IN BLOOD BY AUTOMATED COUNT: 13.2 % (ref 11.9–14.6)
GLOBULIN SER CALC-MCNC: 3.3 G/DL (ref 2.8–4.5)
GLUCOSE BLD STRIP.AUTO-MCNC: 107 MG/DL (ref 65–100)
GLUCOSE BLD STRIP.AUTO-MCNC: 86 MG/DL (ref 65–100)
GLUCOSE BLD STRIP.AUTO-MCNC: 91 MG/DL (ref 65–100)
GLUCOSE BLD STRIP.AUTO-MCNC: 97 MG/DL (ref 65–100)
GLUCOSE SERPL-MCNC: 88 MG/DL (ref 65–100)
HCT VFR BLD AUTO: 36.1 % (ref 41.1–50.3)
HGB BLD-MCNC: 11.4 G/DL (ref 13.6–17.2)
IMM GRANULOCYTES # BLD AUTO: 0 K/UL (ref 0–0.5)
IMM GRANULOCYTES NFR BLD AUTO: 0 % (ref 0–5)
LYMPHOCYTES # BLD: 0.3 K/UL (ref 0.5–4.6)
LYMPHOCYTES NFR BLD: 5 % (ref 13–44)
MCH RBC QN AUTO: 28.6 PG (ref 26.1–32.9)
MCHC RBC AUTO-ENTMCNC: 31.6 G/DL (ref 31.4–35)
MCV RBC AUTO: 90.7 FL (ref 82–102)
MONOCYTES # BLD: 0.7 K/UL (ref 0.1–1.3)
MONOCYTES NFR BLD: 11 % (ref 4–12)
NEUTS SEG # BLD: 5 K/UL (ref 1.7–8.2)
NEUTS SEG NFR BLD: 82 % (ref 43–78)
NRBC # BLD: 0 K/UL (ref 0–0.2)
PLATELET # BLD AUTO: 192 K/UL (ref 150–450)
PMV BLD AUTO: 9.2 FL (ref 9.4–12.3)
POTASSIUM SERPL-SCNC: 3.9 MMOL/L (ref 3.5–5.1)
PROT SERPL-MCNC: 5.9 G/DL (ref 6.3–8.2)
RBC # BLD AUTO: 3.98 M/UL (ref 4.23–5.6)
SERVICE CMNT-IMP: ABNORMAL
SERVICE CMNT-IMP: NORMAL
SODIUM SERPL-SCNC: 139 MMOL/L (ref 133–143)
WBC # BLD AUTO: 6.1 K/UL (ref 4.3–11.1)

## 2022-10-17 PROCEDURE — 86256 FLUORESCENT ANTIBODY TITER: CPT

## 2022-10-17 PROCEDURE — 6370000000 HC RX 637 (ALT 250 FOR IP): Performed by: HOSPITALIST

## 2022-10-17 PROCEDURE — 6370000000 HC RX 637 (ALT 250 FOR IP): Performed by: INTERNAL MEDICINE

## 2022-10-17 PROCEDURE — 86341 ISLET CELL ANTIBODY: CPT

## 2022-10-17 PROCEDURE — 99222 1ST HOSP IP/OBS MODERATE 55: CPT | Performed by: PSYCHIATRY & NEUROLOGY

## 2022-10-17 PROCEDURE — 80053 COMPREHEN METABOLIC PANEL: CPT

## 2022-10-17 PROCEDURE — 97166 OT EVAL MOD COMPLEX 45 MIN: CPT

## 2022-10-17 PROCEDURE — 82962 GLUCOSE BLOOD TEST: CPT

## 2022-10-17 PROCEDURE — 36415 COLL VENOUS BLD VENIPUNCTURE: CPT

## 2022-10-17 PROCEDURE — 97116 GAIT TRAINING THERAPY: CPT

## 2022-10-17 PROCEDURE — 6360000002 HC RX W HCPCS: Performed by: HOSPITALIST

## 2022-10-17 PROCEDURE — 97162 PT EVAL MOD COMPLEX 30 MIN: CPT

## 2022-10-17 PROCEDURE — 86592 SYPHILIS TEST NON-TREP QUAL: CPT

## 2022-10-17 PROCEDURE — 2580000003 HC RX 258: Performed by: HOSPITALIST

## 2022-10-17 PROCEDURE — 2500000003 HC RX 250 WO HCPCS: Performed by: INTERNAL MEDICINE

## 2022-10-17 PROCEDURE — 97530 THERAPEUTIC ACTIVITIES: CPT

## 2022-10-17 PROCEDURE — 86255 FLUORESCENT ANTIBODY SCREEN: CPT

## 2022-10-17 PROCEDURE — 1100000000 HC RM PRIVATE

## 2022-10-17 PROCEDURE — 85025 COMPLETE CBC W/AUTO DIFF WBC: CPT

## 2022-10-17 PROCEDURE — APPNB30 APP NON BILLABLE TIME 0-30 MINS: Performed by: NURSE PRACTITIONER

## 2022-10-17 RX ORDER — AMITRIPTYLINE HYDROCHLORIDE 50 MG/1
50 TABLET, FILM COATED ORAL NIGHTLY
Status: DISCONTINUED | OUTPATIENT
Start: 2022-10-17 | End: 2022-10-24

## 2022-10-17 RX ORDER — LANOLIN ALCOHOL/MO/W.PET/CERES
3 CREAM (GRAM) TOPICAL NIGHTLY PRN
Status: DISCONTINUED | OUTPATIENT
Start: 2022-10-17 | End: 2022-10-24 | Stop reason: HOSPADM

## 2022-10-17 RX ADMIN — FAMOTIDINE 10 MG: 20 TABLET, FILM COATED ORAL at 09:20

## 2022-10-17 RX ADMIN — OXYBUTYNIN CHLORIDE 10 MG: 10 TABLET, EXTENDED RELEASE ORAL at 09:20

## 2022-10-17 RX ADMIN — LEVOTHYROXINE SODIUM 125 MCG: 0.12 TABLET ORAL at 06:26

## 2022-10-17 RX ADMIN — CARVEDILOL 6.25 MG: 6.25 TABLET, FILM COATED ORAL at 17:22

## 2022-10-17 RX ADMIN — CLONAZEPAM 6 MG: 1 TABLET ORAL at 21:20

## 2022-10-17 RX ADMIN — Medication 3 MG: at 21:20

## 2022-10-17 RX ADMIN — SODIUM CHLORIDE, PRESERVATIVE FREE 10 ML: 5 INJECTION INTRAVENOUS at 21:24

## 2022-10-17 RX ADMIN — OLANZAPINE 7.5 MG: 5 TABLET, FILM COATED ORAL at 21:31

## 2022-10-17 RX ADMIN — SODIUM CHLORIDE, PRESERVATIVE FREE 10 ML: 5 INJECTION INTRAVENOUS at 09:20

## 2022-10-17 RX ADMIN — CARVEDILOL 6.25 MG: 6.25 TABLET, FILM COATED ORAL at 09:20

## 2022-10-17 RX ADMIN — TUBERCULIN PURIFIED PROTEIN DERIVATIVE 5 UNITS: 5 INJECTION, SOLUTION INTRADERMAL at 17:22

## 2022-10-17 RX ADMIN — AMITRIPTYLINE HYDROCHLORIDE 50 MG: 50 TABLET, FILM COATED ORAL at 21:20

## 2022-10-17 RX ADMIN — ENOXAPARIN SODIUM 40 MG: 100 INJECTION SUBCUTANEOUS at 09:20

## 2022-10-17 ASSESSMENT — PAIN SCALES - GENERAL: PAINLEVEL_OUTOF10: 0

## 2022-10-17 NOTE — PROGRESS NOTES
ACUTE PHYSICAL THERAPY GOALS:   (Developed with and agreed upon by patient and/or caregiver.)    (1.)Mr. Nolasco will move from supine to sit and sit to supine , scoot up and down, and roll side to side in bed with INDEPENDENCE within 3 treatment day(s). (2.)Mr. Nolasco will transfer from bed to chair and chair to bed with INDEPENDENCE using the least restrictive device within 7 treatment day(s). (3.)Mr. Nolasco will ambulate with MODIFIED INDEPENDENCE for a minimum of 500 feet with the least restrictive device within 7 treatment day(s). (4.)Mr. Nolasco will ambulate up/down 12 steps with use of HR and MODIFIED INDEPENDENCE in order to safely get to his bedroom within 7 treatment days. ________________________________________________________________________________________________     PHYSICAL THERAPY Initial Assessment and Daily Note  (Link to Caseload Tracking: PT Visit Days : 1  Acknowledge Orders  Time In/Out  PT Charge Capture  Rehab Caseload Tracker    Getachew Hernandez is a 76 y.o. male   PRIMARY DIAGNOSIS: Leg weakness, bilateral  Dehydration [E86.0]  Exertional dyspnea [R06.09]  Leg weakness, bilateral [R29.898]  Unable to ambulate [R26.2]  Decreased activities of daily living (ADL) [Z78.9]       Reason for Referral: Generalized Muscle Weakness (M62.81)  Difficulty in walking, Not elsewhere classified (R26.2)  Other abnormalities of gait and mobility (R26.89)  Inpatient: Payor: MEDICARE / Plan: MEDICARE PART A AND B / Product Type: *No Product type* /     ASSESSMENT:     REHAB RECOMMENDATIONS:   Recommendation to date pending progress:  Setting:  Short-term Rehab pending continued findings with testing and progress during stay    Equipment:    Rolling Walker     ASSESSMENT:  Mr. Fe Chaudhari presents with bilateral LE weakness. At baseline he lives in a 2 story home, drives and is independent with all functional mobility without use of AD. PMH includes rectal cancer s/p sigmoidectomy with a colostomy.   He demonstrates decreased safety awareness, balance and abnormal gait. Today he was able to come to stand with CGA and use of the RW. Intermittent command following with some impulsivity and neglect when ambulating, specifically on the R side. He was able to ambulate 26' with use of the RW and CGA-min A x2 due to decreased stability and abnormal gait pattern. He demonstrates a crouched posture with ER in BLE and a wide LONG with trunk flexion. Pt is functioning well below his independent baseline at this time and would continue to benefit from skilled acute care PT to facilitate improvements in the above stated deficits. STR recommended at this time due to sudden onset and current debility.       [address] [facility] [address] [address] AM-PAC 6 Clicks Basic Mobility Inpatient Short Form  AM-PAC Mobility Inpatient   How much difficulty turning over in bed?: None  How much difficulty sitting down on / standing up from a chair with arms?: A Little  How much difficulty moving from lying on back to sitting on side of bed?: None  How much help from another person moving to and from a bed to a chair?: A Little  How much help from another person needed to walk in hospital room?: A Little  How much help from another person for climbing 3-5 steps with a railing?: A Little  [facility] Inpatient Mobility Raw Score : 20  AM-PAC Inpatient T-Scale Score : 47.67  Mobility Inpatient CMS 0-100% Score: 35.83  Mobility Inpatient CMS G-Code Modifier : CJ    SUBJECTIVE:   Mr. Elzbieta Barriga states, \"That was a little scary\"     Social/Functional Lives With: Alone  Type of Home: House  Home Layout: Two level  Bathroom Shower/Tub: Tub/Shower unit  ADL Assistance: Independent  Homemaking Assistance: Independent  Active : Yes    OBJECTIVE:     PAIN: VITALS / O2: PRECAUTION / Priscilla Scrape / DRAINS:   Pre Treatment:   Pain Assessment: None - Denies Pain      Post Treatment: 0/10 Vitals        Oxygen      John Catheter    RESTRICTIONS/PRECAUTIONS:                    GROSS EVALUATION: Intact Impaired (Comments):   AROM [x]     PROM []    Strength []  Generalized weakness in BLE   Balance [] Posture: Fair  Sitting - Static: Good  Sitting - Dynamic: Good  Standing - Static: Fair  Standing - Dynamic: Fair, -   Posture [] Forward Head  Rounded Shoulders   Sensation [x]     Coordination []      Tone []     Edema []    Activity Tolerance [x]  Decreased from his norm    []      COGNITION/  PERCEPTION: Intact Impaired (Comments):   Orientation [x]     Vision [x]     Hearing [x]     Cognition  [x]  Some impulsivity and decreased safety awareness throughout     MOBILITY: I Mod I S SBA CGA Min Mod Max Total  NT x2 Comments:   Bed Mobility    Rolling [] [] [] [] [] [] [] [] [] [x] []    Supine to Sit [] [] [] [x] [] [] [] [] [] [] []    Scooting [] [] [] [x] [] [] [] [] [] [] []    Sit to Supine [] [] [] [x] [] [] [] [] [] [] []    Transfers    Sit to Stand [] [] [] [] [x] [] [] [] [] [] []    Bed to Chair [] [] [] [] [] [] [] [] [] [x] []    Stand to Sit [] [] [] [] [x] [] [] [] [] [] []     [] [] [] [] [] [] [] [] [] [] []    I=Independent, Mod I=Modified Independent, S=Supervision, SBA=Standby Assistance, CGA=Contact Guard Assistance,   Min=Minimal Assistance, Mod=Moderate Assistance, Max=Maximal Assistance, Total=Total Assistance, NT=Not Tested    GAIT: I Mod I S SBA CGA Min Mod Max Total  NT x2 Comments:   Level of Assistance [] [] [] [] [x] [x] [] [] [] [] [x]    Distance 275 feet    DME Rolling Walker    Gait Quality Decreased zoe , Decreased step clearance, Path deviations , Trunk flexion, Trunk sway increased, Wide base of support, and hip and knee flexion with ER of BLE    Weightbearing Status      Stairs      I=Independent, Mod I=Modified Independent, S=Supervision, SBA=Standby Assistance, CGA=Contact Guard Assistance,   Min=Minimal Assistance, Mod=Moderate Assistance, Max=Maximal Assistance, Total=Total Assistance, NT=Not Tested    PLAN:   FREQUENCY AND DURATION: 3 times/week for duration of hospital stay or until stated goals are met, whichever comes first.    THERAPY PROGNOSIS: Fair    PROBLEM LIST:   (Skilled intervention is medically necessary to address:)  Decreased ADL/Functional Activities  Decreased Activity Tolerance  Decreased Balance  Decreased Gait Ability  Decreased Safety Awareness  Decreased Strength INTERVENTIONS PLANNED:   (Benefits and precautions of physical therapy have been discussed with the patient.)  Therapeutic Activity  Therapeutic Exercise/HEP  Neuromuscular Re-education  Gait Training       TREATMENT:   EVALUATION: MODERATE COMPLEXITY: (Untimed Charge)    TREATMENT:   Co-Treatment PT/OT necessary due to patient's decreased overall endurance/tolerance levels, as well as need for high level skilled assistance to complete functional transfers/mobility and functional tasks  Therapeutic Activity (15 Minutes): Therapeutic activity included Supine to Sit, Sit to Supine, Transfer Training, Ambulation on level ground, Sitting balance , and Standing balance to improve functional Activity tolerance, Balance, Coordination, and Mobility.     TREATMENT GRID:  N/A    AFTER TREATMENT PRECAUTIONS: Bed, Bed/Chair Locked, Call light within reach, and Needs within reach    INTERDISCIPLINARY COLLABORATION:  RN/ PCT, PT/ PTA, and OT/ [place]    EDUCATION: Education Given To: Patient  Education Provided: Role of Therapy;Plan of Care  Education Method: Verbal  Barriers to Learning: None  Education Outcome: Verbalized understanding    TIME IN/OUT:  Time In: 1337  Time Out: [address]  Minutes: [facility], [place]

## 2022-10-17 NOTE — CONSULTS
Consult    Patient: Leti Cruz MRN: [id number]     YOB: 1945  Age: 76 y.o. Sex: male      Subjective:      Leti Cruz is a 76 y.o. male who is being seen for difficulty balancing. The patient has a history of rectal cancer and a history of radiation and surgery related to cancer. He presented to the emergency department with difficulty with ambulation. It is difficult to discern why he had difficulty with ambulation. He states that his strength is normal.  He denies dizziness. He states that perhaps his coordination is off. Onset of symptoms were acute. He states that this has occurred in the past.  The patient has at least moderate dysphonia making it difficult to understand what he is saying. He states that he has a dry mouth secondary to amitriptyline and that his speech has been like this for years.     Past Medical History:   Diagnosis Date    Acute deep vein thrombosis (DVT) of non-extremity vein 5/20/2019    Family history of malignant neoplasm of gastrointestinal tract     mother colon/ovarian cancer 67    Fecal incontinence     LAR syndrome    John catheter in place 2022    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    surgery and radiation    Psychiatric disorder     anxiety     Past Surgical History:   Procedure Laterality Date    COLONOSCOPY  last 2/3/15    Roberto--no polyps--3 year recall    COLONOSCOPY N/A 3/5/2021    COLONOSCOPY/BMI 19 performed by Nabil Chen MD at [facility] ENDOSCOPY    COLONOSCOPY N/A 2/12/2018    COLONOSCOPY / BMI=21 performed by Monique Campos MD at [place] [id number]  2/12/2018         COLONOSCOPY THRU STOMA,LESMARGA RMVL W/SNARE  3/5/2021         COLOSTOMY  5/11/16    CT RETROPERITONEAL PERC DRAIN  5/13/2021    CT RETROPERITONEAL PERC DRAIN 5/13/2021 [facility] CT SCAN    CT RETROPERITONEAL PERC DRAIN  5/20/2021    CT RETROPERITONEAL PERC DRAIN 5/20/2021 [facility] CT SCAN    GI  4/2016    Sacral nerve stimlator lead trial (unsucessful) and removal    HERNIA REPAIR      and Colostomy in May    HERNIA REPAIR  3/2015, 5/2016    IR ABSCESS EVAL THRU EXISTING CATH  11/24/2021    IR ABSCESS EVAL THRU EXISTING CATH  6/4/2021    IR ABSCESS EVAL THRU EXISTING CATH  5/20/2021    IR NEPHROSTOGRAM EXISTING ACCESS  12/10/2021    IR NEPHROSTOGRAM EXISTING ACCESS  10/4/2021    IR NEPHROSTOMY EXCHANGE CATHETER  12/7/2021    IR NEPHROSTOMY EXCHANGE CATHETER  11/3/2021    IR NEPHROSTOMY EXCHANGE CATHETER  10/28/2021    IR NEPHROSTOMY PERCUTANEOUS LEFT  9/16/2021    IR NEPHROSTOMY PERCUTANEOUS LEFT  9/16/2021    IR NEPHROSTOMY PERCUTANEOUS LEFT 9/16/2021 [facility]    IR REPLCE T CVC WO PORT SAME ACC  5/30/2019    IR TUBE CHANGE  8/5/2021    IR TUBE CHANGE  7/8/2021    IR TUBE CHANGE  6/14/2021    IR TUBE CHANGE  5/27/2021    IR TUNNELED CATHETER PLACEMENT GREATER THAN 5 YEARS  5/20/2019    IR TUNNELED CATHETER PLACEMENT GREATER THAN 5 YEARS  5/20/2019    IR TUNNELED CATHETER PLACEMENT GREATER THAN 5 YEARS 5/20/2019 [facility]    OTHER SURGICAL HISTORY Bilateral     cataracts  2017    OTHER SURGICAL HISTORY  10/7/2013    Roberto---endorectal us    POLYPECTOMY      TOTAL COLECTOMY  11/2013    Roberto--lap LAR with coloproctostomy, mobilization splenic flexure, diverting loop ileostomy    TOTAL COLECTOMY Right 8/2014    for leak    VASCULAR SURGERY Left 4/14    single lumen port/subsequently removed      Family History Problem Relation Age of Onset    Cancer Mother         colon and ovarian    Cancer Brother         prostate    Alcohol Abuse Brother     Cancer Maternal Grandmother         bone    Alcohol Abuse Father     Stroke Paternal Grandfather     Alcohol Abuse Sister      Social History     Tobacco Use    Smoking status: Former     Packs/day: 1.00     Types: Cigarettes     Quit date: 1963     Years since quittin.9    Smokeless tobacco: Never   Substance Use Topics    Alcohol use: Not Currently     Alcohol/week: 11.7 standard drinks      Current Facility-Administered Medications   Medication Dose Route Frequency Provider Last Rate Last Admin    amitriptyline (ELAVIL) tablet 100 mg  100 mg Oral Nightly Janice Swan MD   100 mg at 10/16/22 2220    carvedilol (COREG) tablet 6.25 mg  6.25 mg Oral BID WC Janice Swan MD   6.25 mg at 10/17/22 0920    clonazePAM (KLONOPIN) tablet 6 mg  6 mg Oral Nightly PRN Maurice Wilkins Ala, MD   6 mg at 10/16/22 2223    famotidine (PEPCID) tablet 10 mg  10 mg Oral Daily Janice Swan MD   10 mg at 10/17/22 0920    levothyroxine (SYNTHROID) tablet 125 mcg  125 mcg Oral Daily Janice Swan MD   125 mcg at 10/17/22 0626    OLANZapine (ZYPREXA) tablet 7.5 mg  7.5 mg Oral Nightly Janice Swan MD   7.5 mg at 10/16/22 2220    oxybutynin (DITROPAN-XL) extended release tablet 10 mg  10 mg Oral Daily Janice Swan MD   10 mg at 10/17/22 0920    sodium chloride flush 0.9 % injection 5-40 mL  5-40 mL IntraVENous 2 times per day Janice Swan MD   10 mL at 10/17/22 0920    sodium chloride flush 0.9 % injection 5-40 mL  5-40 mL IntraVENous PRN Janice Swan MD        0.9 % sodium chloride infusion   IntraVENous PRN Janice Swan MD        enoxaparin (LOVENOX) injection 40 mg  40 mg SubCUTAneous Daily Janice Swan MD   40 mg at 10/17/22 0920    ondansetron (ZOFRAN-ODT) disintegrating tablet 4 mg  4 mg Oral Q8H PRN Janice Swan MD        Or    ondansetron (ZOFRAN) injection 4 mg  4 mg IntraVENous Q6H PRN Yocasta Russell MD polyethylene glycol (GLYCOLAX) packet 17 g  17 g Oral Daily PRN Leocadia Spatz Ala, MD        acetaminophen (TYLENOL) tablet 650 mg  650 mg Oral Q6H PRN Leocadia Spatz Ala, MD        Or    acetaminophen (TYLENOL) suppository 650 mg  650 mg Rectal Q6H PRN Leocadia Spatz Ala, MD        magnesium sulfate 2000 mg in 50 mL IVPB premix  2,000 mg IntraVENous PRN Leocadia Spatz Ala, MD        hyoscyamine (LEVSIN/SL) sublingual tablet 125 mcg  125 mcg SubLINGual Q6H PRN Janice Swan MD            No Known Allergies    Review of Systems:  CONSTITUTIONAL: No weight loss, fever, chills, weakness but positive for fatigue  HEENT: Eyes: No visual loss, blurred vision, double vision or yellow sclerae. Ears, Nose, Throat: No hearing loss, sneezing, congestion, runny nose or sore throat. SKIN: No rash or itching. CARDIOVASCULAR: No chest pain, chest pressure or chest discomfort. No palpitations or edema. RESPIRATORY: No shortness of breath, cough or sputum. GASTROINTESTINAL: No anorexia, nausea, vomiting or diarrhea. No abdominal pain or blood. NEUROLOGICAL: No headache, dizziness, syncope, paralysis, ataxia, numbness or tingling in the extremities. MUSCULOSKELETAL: Occasional muscle aches and pains   LYMPHATICS: No enlarged nodes. No history of splenectomy. PSYCHIATRIC: Positive history of depression and anxiety  ENDOCRINOLOGIC: No reports of sweating, cold or heat intolerance. No polyuria or polydipsia. ALLERGIES: No history of asthma, hives, eczema or rhinitis. Objective:     Vitals:    10/16/22 2219 10/17/22 0022 10/17/22 0501 10/17/22 0726   BP: 127/72 120/61 123/69 124/76   Pulse: 59 60 78 82   Resp:  16 17 16   Temp:  98.2 °F (36.8 °C) 99.3 °F (37.4 °C) 98.8 °F (37.1 °C)   TempSrc:  Oral Oral Oral   SpO2:  95% 93% 94%   Weight:   122 lb 12.7 oz (55.7 kg)    Height:            Physical Exam:  General -: Borderline cachectic. Pleasant and conversant. Dysphonic  HEENT - Normocephalic, atraumatic.  Conjunctiva, tympanic membranes, and oropharynx are clear. Neck - Supple without masses, no bruits   Cardiovascular - Regular rate and rhythm. Normal S1, S2 without murmurs, rubs, or gallops. Lungs - Clear to auscultation. Abdomen - Soft, nontender with normal bowel sounds. Extremities - Peripheral pulses intact. No edema and no rashes. Neurological examination - Comprehension, attention , memory and reasoning are intact. Language is normal.  Speech is at least moderately dysphonic. On cranial nerve examination pupils are equal round and reactive to light. Fundoscopic examination is normal. Visual acuity is adequate. Visual fields are full to finger confrontation. Extraocular motility is normal. Face is symmetric and sensation is intact to light touch. Hearing is intact to finger rustle bilaterally. Motor examination - There is normal muscle tone and bulk. Power is full throughout with exception to 2/5 strength of the iliopsoas and 4 -/5 strength of the quadriceps which is chronic. Muscle stretch reflexes are absent at the patella and Achilles, bilaterally. Sensation is intact to light touch, pinprick, vibration and proprioception in all extremities. He may have some mild proprioceptive deficits in the right lower limb. Cerebellar examination is normal.  Cannot ambulate or stand without significant help        Lab Results   Component Value Date/Time    CHOL 205 12/03/2021 08:09 AM    HDL 77 12/03/2021 08:09 AM    VLDL 18 12/03/2021 08:09 AM        No results found for: HBA1C, ZAU8OHLZ     CT Results (most recent): Personally Reviewed   Results for orders placed during the hospital encounter of 10/16/22    CT HEAD WO CONTRAST    Narrative  HEAD CT WITHOUT CONTRAST  10/16/2022    HISTORY:   ataxia  several days of bilateral leg weakness. TECHNIQUE: Noncontrast axial images were obtained through the brain.   All CT  scans at this facility used dose modulation, interactive reconstruction and/or  weight based dosing when appropriate to reduce radiation dose to as low as  reasonably achievable. COMPARISON: None    FINDINGS: There is no acute intracranial hemorrhage, significant mass effect or  CT evidence of acute large artery territorial infarction. Please note that a  hyperacute infarct or small vessel infarct may not be apparent on initial CT  imaging. Scattered areas of decreased attenuation within the supratentorial white matter  suggest changes of chronic small vessel ischemic disease. Mild cerebral volume  loss is present. There is no hydrocephalus , intra-axial mass or abnormal extra-axial fluid  collection. There are no displaced skull fractures. The mastoid air cells and  paranasal sinuses are clear where imaged. Impression  No acute findings                    Assessment and Plan    57-year-old man with inability to ambulate. On neurological examination, he has at least moderate dysphonia. He states that this is from amitriptyline; however, his dysphonia seems out of proportion to dry mouth from anticholinergic agents. A similar speech pattern can be seen in cerebellar degeneration; although, his speech is not scanning. In addition, he is areflexic in the bilateral lower extremities but without acute weakness. I agree with imaging the patient's neuro-axis with preference towards the brain and lumbar spine. In addition, I will send labs including an RPR and paraneoplastic panel. Therapies advised. Addendum: The patient is on amitriptyline and oxybutynin.   I recommend an alternative to one of these medications given the strong anticholinergic effects of both of these    Addendum #2: The patient is on hyoscyamine which is a third highly potent anticholinergic

## 2022-10-17 NOTE — PROGRESS NOTES
Hospitalist Progress Note   Admit Date:  10/16/2022  8:25 AM   Name:  Jose Francisco Nolasco   Age:  76 y.o. Sex:  male  :  1945   MRN:  [id number]   Room:  [facility]/    Presenting Complaint: Fatigue     Reason(s) for Admission: Dehydration [E86.0]  Exertional dyspnea [R06.09]  Leg weakness, bilateral [R29.898]  Unable to ambulate [R26.2]  Decreased activities of daily living (ADL) [Z78.9]     Hospital Course:     Yumiko Simpson is a 76 y.o. 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 Patient states that he feels so weak that his legs gave out and is having balance issues with walking. Patient denies any fever chills chest pain cough shortness of breath. No palpitations. No nausea no vomiting no diarrhea. No abdominal pain. He denies any falls. ER course  Patient is afebrile, hemodynamically stable. ER physician reported that the patient's oxygen saturation dropped to 88% on room air and was placed on nasal cannula. CT head on admission negative. Patient admitted for further evaluation management as he is unable to ambulate without balance issues. Subjective & 24hr Events (10/17/22): Patient seen and examined at bedside. No acute overnight events. Denies loss of sensation in his legs, says \"my right leg is weaker than my left leg, I had a graft of a muscle removed from this right leg in the past and it is always weaker. \" Has colostomy and John. Denies saddle anesthesia. Denies fevers/chills, chest pain, shortness of breath. 10 point ROS negative except for HPI above.    Assessment & Plan:     Principal Problem:    Leg weakness, bilateral  - neurology following  - CT head upon admission is negative  - metabolic workup pending (paraneoplastic studies, B12, RPR)  - MRI brain and spine (C/T/L) ordered and pending  - limit anticholinergics  - decrease Elavil to 50 mg  - discontinue oxybutynin   - limit Levsin, if possible       Rectal cancer (HCC)  - needs oupatient follow-up  - workup as above    # Hypothyroidism  - Synthroid 125 mcg      Anticipated discharge needs:      PT/OT consults and PPD ordered. Discussed with patient at bedside. All questions answered. Diet:  ADULT DIET; Regular; Low Fat/Low Chol/High Fiber/2 gm Na  DVT PPx: Lovenox   Code status: Full Code    Hospital Problems:  Principal Problem:    Leg weakness, bilateral  Active Problems:    Unable to ambulate    Rectal cancer (HCC)    Decreased activities of daily living (ADL)    Hypothyroidism  Resolved Problems:    * No resolved hospital problems. *      Objective:   Patient Vitals for the past 24 hrs:   Temp Pulse Resp BP SpO2   10/17/22 1146 98 °F (36.7 °C) 63 16 122/71 99 %   10/17/22 0726 98.8 °F (37.1 °C) 82 16 124/76 94 %   10/17/22 0501 99.3 °F (37.4 °C) 78 17 123/69 93 %   10/17/22 0022 98.2 °F (36.8 °C) 60 16 120/61 95 %   10/16/22 2219 -- 59 -- 127/72 --   10/16/22 1949 98.2 °F (36.8 °C) 56 16 (!) 108/58 97 %   10/16/22 1649 -- 63 -- (!) 156/64 --   10/16/22 1545 97.4 °F (36.3 °C) 64 14 (!) 157/65 99 %       Oxygen Therapy  SpO2: 99 %  O2 Device: None (Room air)    Estimated body mass index is 18.4 kg/m² as calculated from the following:    Height as of an earlier encounter on 10/16/22: 5' 8.5\" (1.74 m). Weight as of this encounter: 122 lb 12.7 oz (55.7 kg). Intake/Output Summary (Last 24 hours) at 10/17/2022 1519  Last data filed at 10/17/2022 1420  Gross per 24 hour   Intake 840 ml   Output 1100 ml   Net -260 ml         Physical Exam:     Blood pressure 122/71, pulse 63, temperature 98 °F (36.7 °C), temperature source Oral, resp.  rate 16, height 5' 8.5\" (1.74 m), weight 122 lb 12.7 oz (55.7 kg), SpO2 99 %. General:    Well nourished. Head:  Normocephalic, atraumatic  Eyes:  Sclerae appear normal.  Pupils equally round. ENT:  Nares appear normal, no drainage. Moist oral mucosa  Neck:  No restricted ROM. Trachea midline   CV:   RRR. No jugular venous distension. Lungs:   CTAB. No wheezing, rhonchi, or rales. Symmetric expansion. Abdomen: Bowel sounds present. Soft, nontender, nondistended. Extremities: No cyanosis or clubbing. No edema  Skin:     No rashes and normal coloration. Warm and dry. Neuro:  CN II-XII grossly intact. Sensation intact throughout. Gait/balance assessment deferred. +5/5 muscle strength at the ankle. +4/5 muscle strength on hip flexion, stronger in L>R  Psych:  Normal mood and affect.       I have personally reviewed labs and tests showing:  Recent Labs:  Recent Results (from the past 48 hour(s))   CBC with Auto Differential    Collection Time: 10/16/22  8:16 AM   Result Value Ref Range    WBC 9.3 4.3 - 11.1 K/uL    RBC 4.12 (L) 4.23 - 5.6 M/uL    Hemoglobin 11.9 (L) 13.6 - 17.2 g/dL    Hematocrit 37.6 (L) 41.1 - 50.3 %    MCV 91.3 82 - 102 FL    MCH 28.9 26.1 - 32.9 PG    MCHC 31.6 31.4 - 35.0 g/dL    RDW 13.2 11.9 - 14.6 %    Platelets 795 917 - 766 K/uL    MPV 8.7 (L) 9.4 - 12.3 FL    nRBC 0.00 0.0 - 0.2 K/uL    Differential Type AUTOMATED      Seg Neutrophils 87 (H) 43 - 78 %    Lymphocytes 4 (L) 13 - 44 %    Monocytes 9 4.0 - 12.0 %    Eosinophils % 0 (L) 0.5 - 7.8 %    Basophils 0 0.0 - 2.0 %    Immature Granulocytes 0 0.0 - 5.0 %    Segs Absolute 8.0 1.7 - 8.2 K/UL    Absolute Lymph # 0.4 (L) 0.5 - 4.6 K/UL    Absolute Mono # 0.8 0.1 - 1.3 K/UL    Absolute Eos # 0.0 0.0 - 0.8 K/UL    Basophils Absolute 0.0 0.0 - 0.2 K/UL    Absolute Immature Granulocyte 0.0 0.0 - 0.5 K/UL   Comprehensive Metabolic Panel    Collection Time: 10/16/22  8:16 AM   Result Value Ref Range    Sodium 136 133 - 143 mmol/L    Potassium 3.9 3.5 - 5.1 mmol/L    Chloride 108 101 - 110 mmol/L    CO2 24 21 - 32 mmol/L    Anion Gap 4 2 - 11 mmol/L    Glucose 105 (H) 65 - 100 mg/dL    BUN 30 (H) 8 - 23 MG/DL    Creatinine 1.50 0.8 - 1.5 MG/DL    Est, Glom Filt Rate 47 (L) >60 ml/min/1.73m2    Calcium 8.5 8.3 - 10.4 MG/DL    Total Bilirubin 0.3 0.2 - 1.1 MG/DL    ALT 28 12 - 65 U/L    AST 16 15 - 37 U/L    Alk Phosphatase 75 50 - 136 U/L    Total Protein 6.6 6.3 - 8.2 g/dL    Albumin 3.0 (L) 3.2 - 4.6 g/dL    Globulin 3.6 2.8 - 4.5 g/dL    Albumin/Globulin Ratio 0.8 0.4 - 1.6     TSH with Reflex    Collection Time: 10/16/22  8:16 AM   Result Value Ref Range    TSH w Free Thyroid if Abnormal 0.72 0.358 - 3.740 UIU/ML   Vitamin B12    Collection Time: 10/16/22  8:16 AM   Result Value Ref Range    Vitamin B-12 1069 (H) 193 - 986 pg/mL   POCT Glucose    Collection Time: 10/16/22  9:04 PM   Result Value Ref Range    POC Glucose 135 (H) 65 - 100 mg/dL    Performed by: [facility]    Comprehensive Metabolic Panel w/ Reflex to MG    Collection Time: 10/17/22  5:36 AM   Result Value Ref Range    Sodium 139 133 - 143 mmol/L    Potassium 3.9 3.5 - 5.1 mmol/L    Chloride 110 101 - 110 mmol/L    CO2 24 21 - 32 mmol/L    Anion Gap 5 2 - 11 mmol/L    Glucose 88 65 - 100 mg/dL    BUN 20 8 - 23 MG/DL    Creatinine 1.00 0.8 - 1.5 MG/DL    Est, Glom Filt Rate >60 >60 ml/min/1.73m2    Calcium 8.5 8.3 - 10.4 MG/DL    Total Bilirubin 0.4 0.2 - 1.1 MG/DL    ALT 24 12 - 65 U/L    AST 16 15 - 37 U/L    Alk Phosphatase 69 50 - 136 U/L    Total Protein 5.9 (L) 6.3 - 8.2 g/dL    Albumin 2.6 (L) 3.2 - 4.6 g/dL    Globulin 3.3 2.8 - 4.5 g/dL    Albumin/Globulin Ratio 0.8 0.4 - 1.6     CBC with Auto Differential    Collection Time: 10/17/22  5:36 AM   Result Value Ref Range    WBC 6.1 4.3 - 11.1 K/uL    RBC 3.98 (L) 4.23 - 5.6 M/uL    Hemoglobin 11.4 (L) 13.6 - 17.2 g/dL    Hematocrit 36.1 (L) 41.1 - 50.3 %    MCV 90.7 82 - 102 FL    MCH 28.6 26.1 - 32.9 PG    MCHC 31.6 31.4 - 35.0 g/dL    RDW 13.2 11.9 - 14.6 % Platelets 846 703 - 532 K/uL    MPV 9.2 (L) 9.4 - 12.3 FL    nRBC 0.00 0.0 - 0.2 K/uL    Differential Type AUTOMATED      Seg Neutrophils 82 (H) 43 - 78 %    Lymphocytes 5 (L) 13 - 44 %    Monocytes 11 4.0 - 12.0 %    Eosinophils % 1 0.5 - 7.8 %    Basophils 1 0.0 - 2.0 %    Immature Granulocytes 0 0.0 - 5.0 %    Segs Absolute 5.0 1.7 - 8.2 K/UL    Absolute Lymph # 0.3 (L) 0.5 - 4.6 K/UL    Absolute Mono # 0.7 0.1 - 1.3 K/UL    Absolute Eos # 0.1 0.0 - 0.8 K/UL    Basophils Absolute 0.0 0.0 - 0.2 K/UL    Absolute Immature Granulocyte 0.0 0.0 - 0.5 K/UL   POCT Glucose    Collection Time: 10/17/22  7:28 AM   Result Value Ref Range    POC Glucose 86 65 - 100 mg/dL    Performed by: Sophia Heller    Miscellaneous Sendout    Collection Time: 10/17/22 11:23 AM   Result Value Ref Range    Test Description: [facility] ENS1 PANEL     Reference Lab [facility]      Results: PENDING    POCT Glucose    Collection Time: 10/17/22 11:25 AM   Result Value Ref Range    POC Glucose 107 (H) 65 - 100 mg/dL    Performed by: Sophia Heller        I have personally reviewed imaging studies showing: Other Studies:  CT HEAD WO CONTRAST   Final Result   No acute findings         XR CHEST (2 VW)   Final Result   No acute cardiopulmonary process.       MRI BRAIN W WO CONTRAST    (Results Pending)   MRI CERVICAL SPINE W WO CONTRAST    (Results Pending)   MRI LUMBAR SPINE W WO CONTRAST    (Results Pending)   MRI THORACIC SPINE W WO CONTRAST    (Results Pending)       Current Meds:  Current Facility-Administered Medications   Medication Dose Route Frequency    amitriptyline (ELAVIL) tablet 50 mg  50 mg Oral Nightly    melatonin tablet 3 mg  3 mg Oral Nightly PRN    carvedilol (COREG) tablet 6.25 mg  6.25 mg Oral BID WC    clonazePAM (KLONOPIN) tablet 6 mg  6 mg Oral Nightly PRN    famotidine (PEPCID) tablet 10 mg  10 mg Oral Daily    levothyroxine (SYNTHROID) tablet 125 mcg  125 mcg Oral Daily    OLANZapine (ZYPREXA) tablet 7.5 mg  7.5 mg Oral Nightly    sodium chloride flush 0.9 % injection 5-40 mL  5-40 mL IntraVENous 2 times per day    sodium chloride flush 0.9 % injection 5-40 mL  5-40 mL IntraVENous PRN    0.9 % sodium chloride infusion   IntraVENous PRN    enoxaparin (LOVENOX) injection 40 mg  40 mg SubCUTAneous Daily    ondansetron (ZOFRAN-ODT) disintegrating tablet 4 mg  4 mg Oral Q8H PRN    Or    ondansetron (ZOFRAN) injection 4 mg  4 mg IntraVENous Q6H PRN    polyethylene glycol (GLYCOLAX) packet 17 g  17 g Oral Daily PRN    acetaminophen (TYLENOL) tablet 650 mg  650 mg Oral Q6H PRN    Or    acetaminophen (TYLENOL) suppository 650 mg  650 mg Rectal Q6H PRN    magnesium sulfate 2000 mg in 50 mL IVPB premix  2,000 mg IntraVENous PRN    hyoscyamine (LEVSIN/SL) sublingual tablet 125 mcg  125 mcg SubLINGual Q6H PRN       Signed:  BRENT RAMOS DO    Part of this note may have been written by using a voice dictation software. The note has been proof read but may still contain some grammatical/other typographical errors.

## 2022-10-17 NOTE — PROGRESS NOTES
ACUTE OCCUPATIONAL THERAPY GOALS:   (Developed with and agreed upon by patient and/or caregiver.)  1. Patient will complete lower body bathing and dressing with modified independence and adaptive equipment as needed. 2. Patient will complete toileting with modified independence. 3. Patient will tolerate 30 minutes of OT treatment with 1-2 rest breaks to increase activity tolerance for ADLs. 4. Patient will complete functional transfers with modified independence and adaptive equipment as needed. 5. Patient will complete functional mobility of household distances with modified independence and appropriate safety awareness. Timeframe: 7 visits      OCCUPATIONAL THERAPY Initial Assessment, Daily Note, and PM       OT Visit Days: 1  Acknowledge Orders  Time  OT Charge Capture  Rehab Caseload Tracker      Brigette Grullon is a 76 y.o. male   PRIMARY DIAGNOSIS: Leg weakness, bilateral  Dehydration [E86.0]  Exertional dyspnea [R06.09]  Leg weakness, bilateral [R29.898]  Unable to ambulate [R26.2]  Decreased activities of daily living (ADL) [Z78.9]       Reason for Referral: Generalized Muscle Weakness (M62.81)  Inpatient: Payor: Malik Godinez / Plan: MEDICARE PART A AND B / Product Type: *No Product type* /     ASSESSMENT:     REHAB RECOMMENDATIONS:   Recommendation to date pending progress:  Setting:  Short-term Rehab    Equipment:    To Be Determined     ASSESSMENT:  Mr. Patrick Sarmiento presents with B LE weakness and the above medical complications. Per chart, Pt has hx of rectal cancer and has moderate dysphonia. Pt found supine in bed upon arrival. Pt denies pain. Pt performed bed mobility with SBA. Pt required CGA-Min A x 2 for sit to stand transfer and functional mobility with RW. Pt had a wide base of support and legs were externally rotated during functional mobility. Pt was fatigued towards the end of the session. Pt left supine in bed with call bell and needs within reach.  Pt is limited by decreased strength, balance, and activity tolerance which impacts ability to be safe and independent with ADL and functional mobility. OT is medically necessary and will address goals and plan of care.       [address] [facility] [address] [address] AM-PAC 6 Clicks Daily Activity Inpatient Short Form:    AM-PAC Daily Activity Inpatient   How much help for putting on and taking off regular lower body clothing?: A Lot  How much help for Bathing?: A Lot  How much help for Toileting?: A Little  How much help for putting on and taking off regular upper body clothing?: A Little  How much help for taking care of personal grooming?: A Little  How much help for eating meals?: A Little  AM-PAC Inpatient Daily Activity Raw Score: 16  AM-PAC Inpatient ADL T-Scale Score : 35.96  ADL Inpatient CMS 0-100% Score: 53.32  ADL Inpatient CMS G-Code Modifier : CK           SUBJECTIVE:     Mr. Yoanna Wu states, \"I feel unsteady\"     Social/Functional Lives With: Alone  Type of Home: House  Home Layout: Two level  Bathroom Shower/Tub: Tub/Shower unit  ADL Assistance: Independent  Homemaking Assistance: Independent  Active : Yes    OBJECTIVE:     Melissa Valladares / Jackie Lopez / [place]: Colostomy and John Catheter    RESTRICTIONS/PRECAUTIONS:       PAIN: VITALS / O2:   Pre Treatment:   Pain Assessment: None - Denies Pain      Post Treatment: Same        Vitals          Oxygen            GROSS EVALUATION: INTACT IMPAIRED   (See Comments)   UE AROM [] []Appears to be functional    UE PROM [] []   Strength []  Generalized weakness      Posture / Balance [] Posture: Fair  Sitting - Static: Good  Sitting - Dynamic: Good  Standing - Static: Fair  Standing - Dynamic: Fair, -   Sensation [x]     Coordination []       Tone []       Edema []    Activity Tolerance []  Limited by fatigue      Hand Dominance R [] L []      COGNITION/  PERCEPTION: INTACT IMPAIRED   (See Comments)   Orientation []  Appears oriented during conversation    Vision []  Pt bumped into objects on R side of walker during functional mobility    Hearing [x]     Cognition  [x]     Perception []       MOBILITY: I Mod I S SBA CGA Min Mod Max Total  NT x2 Comments:   Bed Mobility    Rolling [] [] [] [] [] [] [] [] [] [x] []    Supine to Sit [] [] [] [x] [] [] [] [] [] [] []    Scooting [] [] [] [x] [] [] [] [] [] [] []    Sit to Supine [] [] [] [x] [] [] [] [] [] [] []    Transfers    Sit to Stand [] [] [] [] [x] [x] [] [] [] [] [x]    Bed to Chair [] [] [] [] [] [] [] [] [] [x] []    Stand to Sit [] [] [] [] [x] [x] [] [] [] [] [x]    Tub/Shower [] [] [] [] [] [] [] [] [] [x] []     Toilet [] [] [] [] [] [] [] [] [] [x] []      [] [] [] [] [] [] [] [] [] [] []    I=Independent, Mod I=Modified Independent, S=Supervision/Setup, SBA=Standby Assistance, CGA=Contact Guard Assistance, Min=Minimal Assistance, Mod=Moderate Assistance, Max=Maximal Assistance, Total=Total Assistance, NT=Not Tested    ACTIVITIES OF DAILY LIVING: I Mod I S SBA CGA Min Mod Max Total NT Comments   BASIC ADLs:              Upper Body Bathing  [] [] [] [] [] [] [] [] [] [x]    Lower Body Bathing [] [] [] [] [] [] [] [] [] [x]    Toileting [] [] [] [] [] [] [] [] [] [x]    Upper Body Dressing [] [] [] [] [] [] [] [] [] [x]    Lower Body Dressing [] [] [] [] [] [] [] [] [] [x]    Feeding [] [] [] [] [] [] [] [] [] [x]    Grooming [] [] [] [] [] [] [] [] [] [x]    Personal Device Care [] [] [] [] [] [] [] [] [] [x]    Functional Mobility [] [] [] [] [x] [x] [] [] [] [] X 2 with RW    I=Independent, Mod I=Modified Independent, S=Supervision/Setup, SBA=Standby Assistance, CGA=Contact Guard Assistance, Min=Minimal Assistance, Mod=Moderate Assistance, Max=Maximal Assistance, Total=Total Assistance, NT=Not Tested    PLAN:   FREQUENCY/DURATION   OT Plan of Care: 3 times/week for duration of hospital stay or until stated goals are met, whichever comes first.    PROBLEM LIST:   (Skilled intervention is medically necessary to address:)  Decreased ADL/Functional Activities  Decreased Activity Tolerance  Decreased Balance  Decreased Gait Ability  Decreased Strength  Decreased Transfer Abilities   INTERVENTIONS PLANNED:  (Benefits and precautions of occupational therapy have been discussed with the patient.)  Self Care Training  Therapeutic Activity  Therapeutic Exercise/HEP  Neuromuscular Re-education  Manual Therapy  Education         TREATMENT:     EVALUATION: MODERATE COMPLEXITY: (Untimed Charge)    TREATMENT:   Co-Treatment PT/OT necessary due to patient's decreased overall endurance/tolerance levels, as well as need for high level skilled assistance to complete functional transfers/mobility and functional tasks  Therapeutic Activity (10 Minutes): Therapeutic activity included Supine to Sit, Sit to Supine, Scooting, Transfer Training, Sitting balance , and Standing balance to improve functional Activity tolerance, Balance, Coordination, Strength, and ROM.     TREATMENT GRID:  N/A    AFTER TREATMENT PRECAUTIONS: Bed, Bed/Chair Locked, Call light within reach, and Needs within reach    INTERDISCIPLINARY COLLABORATION:  RN/ PCT, PT/ PTA, OT/ FARNSWORTH, and OTS    EDUCATION:  Education Given To: Patient  Education Provided: Role of Therapy  Education Method: Verbal  Education Outcome: Verbalized understanding    TOTAL TREATMENT DURATION AND TIME:  Time In: 1080  Time Out: [address]  Minutes: [address]

## 2022-10-17 NOTE — CARE COORDINATION
CM met with patient to complete initial assessment. Patient alert and oriented x3. States that he lives in a two story town home alone with 12 steps inside, 0STE. Patient says prior to hospitalization, he was independent with ADL's, still drives. No DME's. Demographics, insurance and PCP confirmed. Patient is current with [facility] for PT. Patient had [facility] a few weeks ago for wound care - states that he had it for a couple of months. Patient has hx of being in [facility], [address]. OT evaluated patient and recommended STR for patient. PT evals still pending. Patient agreeable and asked that referral be sent to [facility]. SNF list given to patient in which CM asked patient to pick 2 more choices. CM will return tomorrow am to get additional choices. Referrals will be sent to facilities after PT evaluation complete. CM will continue to follow. RN notified to place PPD.       10/17/22 1766   Service Assessment   Patient Orientation Alert and Oriented   Cognition Alert   History Provided By Patient   Primary Caregiver Self   PCP Verified by CM Yes   Prior Functional Level Independent in ADLs/IADLs   Current Functional Level Independent in ADLs/IADLs   Ability to make needs known: Good   Social/Functional History   Lives With Alone   Type of [address] Two [address]   Discharge Planning   Type of Residence House   Living Arrangements Alone   Potential Assistance Needed N/A   Patient expects to be discharged to: [address] Discharge   Transition of [address] ([address]) [facility]charge [facility] ([facility])

## 2022-10-18 ENCOUNTER — APPOINTMENT (OUTPATIENT)
Dept: MRI IMAGING | Age: 77
DRG: 556 | End: 2022-10-18
Payer: MEDICARE

## 2022-10-18 LAB
ANTI-YO (PURKINJE CELL): NEGATIVE TITER
HU ANTIBODY: NEGATIVE TITER
MM INDURATION, POC: 0 MM (ref 0–5)
PPD, POC: NEGATIVE
RI ANTIBODY: NEGATIVE TITER
RPR SER QL: NONREACTIVE

## 2022-10-18 PROCEDURE — 6370000000 HC RX 637 (ALT 250 FOR IP): Performed by: INTERNAL MEDICINE

## 2022-10-18 PROCEDURE — 97535 SELF CARE MNGMENT TRAINING: CPT

## 2022-10-18 PROCEDURE — 97112 NEUROMUSCULAR REEDUCATION: CPT

## 2022-10-18 PROCEDURE — 72156 MRI NECK SPINE W/O & W/DYE: CPT

## 2022-10-18 PROCEDURE — A9579 GAD-BASE MR CONTRAST NOS,1ML: HCPCS | Performed by: INTERNAL MEDICINE

## 2022-10-18 PROCEDURE — 6360000004 HC RX CONTRAST MEDICATION: Performed by: INTERNAL MEDICINE

## 2022-10-18 PROCEDURE — 70553 MRI BRAIN STEM W/O & W/DYE: CPT

## 2022-10-18 PROCEDURE — 99232 SBSQ HOSP IP/OBS MODERATE 35: CPT | Performed by: PSYCHIATRY & NEUROLOGY

## 2022-10-18 PROCEDURE — 6360000002 HC RX W HCPCS: Performed by: HOSPITALIST

## 2022-10-18 PROCEDURE — 72158 MRI LUMBAR SPINE W/O & W/DYE: CPT

## 2022-10-18 PROCEDURE — 97110 THERAPEUTIC EXERCISES: CPT

## 2022-10-18 PROCEDURE — 6370000000 HC RX 637 (ALT 250 FOR IP): Performed by: HOSPITALIST

## 2022-10-18 PROCEDURE — 97116 GAIT TRAINING THERAPY: CPT

## 2022-10-18 PROCEDURE — 1100000000 HC RM PRIVATE

## 2022-10-18 PROCEDURE — APPSS45 APP SPLIT SHARED TIME 31-45 MINUTES: Performed by: NURSE PRACTITIONER

## 2022-10-18 PROCEDURE — 2580000003 HC RX 258: Performed by: HOSPITALIST

## 2022-10-18 RX ADMIN — Medication 3 MG: at 21:37

## 2022-10-18 RX ADMIN — GADOTERIDOL 10 ML: 279.3 INJECTION, SOLUTION INTRAVENOUS at 20:48

## 2022-10-18 RX ADMIN — SODIUM CHLORIDE, PRESERVATIVE FREE 10 ML: 5 INJECTION INTRAVENOUS at 09:01

## 2022-10-18 RX ADMIN — CARVEDILOL 6.25 MG: 6.25 TABLET, FILM COATED ORAL at 17:21

## 2022-10-18 RX ADMIN — CLONAZEPAM 6 MG: 1 TABLET ORAL at 21:37

## 2022-10-18 RX ADMIN — ENOXAPARIN SODIUM 40 MG: 100 INJECTION SUBCUTANEOUS at 09:00

## 2022-10-18 RX ADMIN — FAMOTIDINE 10 MG: 20 TABLET, FILM COATED ORAL at 09:00

## 2022-10-18 RX ADMIN — AMITRIPTYLINE HYDROCHLORIDE 50 MG: 50 TABLET, FILM COATED ORAL at 21:37

## 2022-10-18 RX ADMIN — CARVEDILOL 6.25 MG: 6.25 TABLET, FILM COATED ORAL at 09:00

## 2022-10-18 RX ADMIN — OLANZAPINE 7.5 MG: 5 TABLET, FILM COATED ORAL at 21:37

## 2022-10-18 RX ADMIN — SODIUM CHLORIDE, PRESERVATIVE FREE 10 ML: 5 INJECTION INTRAVENOUS at 20:27

## 2022-10-18 RX ADMIN — LEVOTHYROXINE SODIUM 125 MCG: 0.12 TABLET ORAL at 05:21

## 2022-10-18 NOTE — PROGRESS NOTES
ACUTE PHYSICAL THERAPY GOALS:   (Developed with and agreed upon by patient and/or caregiver.)  (1.)Mr. Nolasco will move from supine to sit and sit to supine , scoot up and down, and roll side to side in bed with INDEPENDENCE within 3 treatment day(s). (2.)Mr. Nolasco will transfer from bed to chair and chair to bed with INDEPENDENCE using the least restrictive device within 7 treatment day(s). (3.)Mr. Nolasco will ambulate with MODIFIED INDEPENDENCE for a minimum of 500 feet with the least restrictive device within 7 treatment day(s). (4.)Mr. Nolasco will ambulate up/down 12 steps with use of HR and MODIFIED INDEPENDENCE in order to safely get to his bedroom within 7 treatment days. PHYSICAL THERAPY: Daily Note AM   (Link to Caseload Tracking: PT Visit Days : 2  Time In/Out PT Charge Capture  Rehab Caseload Tracker  Orders    Darci Jett is a 76 y.o. male   PRIMARY DIAGNOSIS: Leg weakness, bilateral  Dehydration [E86.0]  Exertional dyspnea [R06.09]  Leg weakness, bilateral [R29.898]  Unable to ambulate [R26.2]  Decreased activities of daily living (ADL) [Z78.9]       Inpatient: Payor: MEDICARE / Plan: MEDICARE PART A AND B / Product Type: *No Product type* /     ASSESSMENT:     REHAB RECOMMENDATIONS:   Recommendation to date pending progress:  Setting:  Inpatient Rehab Facility    Equipment:    Rolling Walker     ASSESSMENT:  Mr. Andre Elizondo presents supine and is very agreeable to therapy. He sat to side of bed with SBA. He stood and was able to ambulate in perez using rolling walker and CGA/min assist x 2. He ambulates with wider base of support, feet slightly externally rotated, and cues to watch for objects on R side so he doesn't run into them. After resting he stood ~10 minutes at sink while performing ADL with OT. He rested again and performed standing LE exercises. He was left up in chair, alarm on, needs in reach. He continues to make progress towards goals and puts forth great effort.   Will continue with POC.      SUBJECTIVE:   Mr. Trina Will states, \"I want to get better so I can get home\"     Social/Functional Lives With: Alone  Type of Home: House  Home Layout: Two level  Bathroom Shower/Tub: Tub/Shower unit  ADL Assistance: Independent  Homemaking Assistance: Independent  Active : Yes  OBJECTIVE:     PAIN: Snow Hard / O2: Irais Kluver / Kodi Kaplan / Mark Scrape:   Pre Treatment: 0         Post Treatment: 0 Vitals        Oxygen    IV    RESTRICTIONS/PRECAUTIONS:        MOBILITY: I Mod I S SBA CGA Min Mod Max Total  NT x2 Comments:   Bed Mobility    Rolling [] [] [] [] [] [] [] [] [] [] []    Supine to Sit [] [] [] [x] [] [] [] [] [] [] []    Scooting [] [] [] [x] [] [] [] [] [] [] []    Sit to Supine [] [] [] [] [] [] [] [] [] [] []    Transfers    Sit to Stand [] [] [] [] [x] [] [] [] [] [] []    Bed to Chair [] [] [] [] [] [] [] [] [] [] []    Stand to Sit [] [] [] [] [x] [] [] [] [] [] []     [] [] [] [] [] [] [] [] [] [] []    I=Independent, Mod I=Modified Independent, S=Supervision, SBA=Standby Assistance, CGA=Contact Guard Assistance,   Min=Minimal Assistance, Mod=Moderate Assistance, Max=Maximal Assistance, Total=Total Assistance, NT=Not Tested    BALANCE: Good Fair+ Fair Fair- Poor NT Comments   Sitting Static [] [] [] [] [] []    Sitting Dynamic [] [] [] [] [] []              Standing Static [] [] [] [] [] []    Standing Dynamic [] [] [] [] [] []      GAIT: I Mod I S SBA CGA Min Mod Max Total  NT x2 Comments:   Level of Assistance [] [] [] [] [x] [x] [] [] [] [] [x]    Distance 120' x 2, 10' x 2 feet    DME Rolling Walker    Gait Quality Decreased zoe , Decreased step length, Path deviations , Trunk sway increased, Wide base of support, and hip and knee flexion, B LE ER    Weightbearing Status      Stairs      I=Independent, Mod I=Modified Independent, S=Supervision, SBA=Standby Assistance, CGA=Contact Guard Assistance,   Min=Minimal Assistance, Mod=Moderate Assistance, Max=Maximal Assistance, Total=Total Assistance, NT=Not Tested    PLAN:   FREQUENCY AND DURATION: 3 times/week for duration of hospital stay or until stated goals are met, whichever comes first.    TREATMENT:   TREATMENT:   Co-Treatment PT/OT necessary due to patient's decreased overall endurance/tolerance levels, as well as need for high level skilled assistance to complete functional transfers/mobility and functional tasks  Therapeutic Exercise (25 Minutes): Therapeutic exercises noted below to improve functional activity tolerance, AROM, strength, and mobility. Gait Training (13 Minutes): Gait training for 120' x 2, 10' x 2 feet utilizing Gait Belt and [place]. Patient required Tactile, Verbal, and Visual cueing to improve Activity Pacing, Assistive Device Utilization, Dynamic Standing Balance, and Gait Mechanics.      TREATMENT GRID:   Date:  10/18/22 Date:   Date:     Activity/Exercise Parameters Parameters Parameters   Standing heel raises X 15 B     Standing hip flexion X 10 B     Standing hip abd X 10 B     Standing tolerance with activity ~10 minutes     Standing and reaching for objects in all planes ~ 2 minutes                     AFTER TREATMENT PRECAUTIONS: Alarm Activated, Bed/Chair Locked, Call light within reach, Chair, Needs within reach, and RN notified    INTERDISCIPLINARY COLLABORATION:  RN/ PCT, PT/ PTA, and OT/ FARNSWORTH    EDUCATION:      TIME IN/OUT:  Time In: 0951  Time Out: [address]  Minutes: 45    TASHI PEDROZA PTA

## 2022-10-18 NOTE — CARE COORDINATION
Patient discussed in [address]. Per therapies, patient may be appropriate for [facility]. CM spoke with patient who is agreeable to going to [facility] . CM called liaisonHan with [facility] who stated that someone would be up here to meet and evaluate patient. 2nd discharge plan will be STR. CM will continue to follow. 2:00 - CM notified by [facility] that patient denied for inpatient rehab there - the medical director states that patient is too high functioning for that level of rehab. CM met with patient and updated him. He asked that referrals be sent to [facility] [address]. All referrals sent through [facility].

## 2022-10-18 NOTE — PROGRESS NOTES
Neurology Daily Progress Note     Assessment:     57-year-old man with inability to ambulate. On neurological examination, he has at least moderate dysphonia. He states that this is from amitriptyline; however, his dysphonia seems out of proportion to dry mouth from anticholinergic agents. A similar speech pattern can be seen in cerebellar degeneration. In addition, he is areflexic in the bilateral lower extremities but without acute weakness. I agree with imaging the patient's neuro-axis with preference towards the brain and lumbar spine. Additional labs were sent. Therapies advised. Plan:     The patient was on multiple anticholinergic medications. These have been reduced or discontinued. This may be contributing, at least in part, to patients balance issues. MRI brain and spine, with preference to brain and lumbar    PT/OT    Subjective: Interval history:    Patient reports balance and speech are unchanged. He is able to get around with walker. Feels speech is at baseline and has been this way for years    History:    Rebecca Queen is a 76 y.o. male who is being seen for imbalance.     Past Medical History:   Diagnosis Date    Acute deep vein thrombosis (DVT) of non-extremity vein 5/20/2019    Family history of malignant neoplasm of gastrointestinal tract     mother colon/ovarian cancer 67    Fecal incontinence     LAR syndrome    John catheter in place 2022    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    surgery and radiation    Psychiatric disorder     anxiety     Past Surgical History:   Procedure Laterality Date    COLONOSCOPY  last 2/3/15    Roberto--no polyps--3 year recall    COLONOSCOPY N/A 3/5/2021    COLONOSCOPY/BMI 19 performed by Jessica Ram MD at [facility] ENDOSCOPY    COLONOSCOPY N/A 2/12/2018    COLONOSCOPY / BMI=21 performed by Yves Rivero MD at [place] [id number]  2/12/2018         COLONOSCOPY THRU STOMA,LESN RMVL W/SNARE  3/5/2021         COLOSTOMY  5/11/16    CT RETROPERITONEAL PERC DRAIN  5/13/2021    CT RETROPERITONEAL PERC DRAIN 5/13/2021 [facility] CT SCAN    CT RETROPERITONEAL PERC DRAIN  5/20/2021    CT RETROPERITONEAL PERC DRAIN 5/20/2021 [facility] CT SCAN    GI  4/2016    Sacral nerve stimlator lead trial (unsucessful) and removal    HERNIA REPAIR      and Colostomy in May    HERNIA REPAIR  3/2015, 5/2016    IR ABSCESS EVAL THRU EXISTING CATH  11/24/2021    IR ABSCESS EVAL THRU EXISTING CATH  6/4/2021    IR ABSCESS EVAL THRU EXISTING CATH  5/20/2021    IR NEPHROSTOGRAM EXISTING ACCESS  12/10/2021    IR NEPHROSTOGRAM EXISTING ACCESS  10/4/2021    IR NEPHROSTOMY EXCHANGE CATHETER  12/7/2021    IR NEPHROSTOMY EXCHANGE CATHETER  11/3/2021    IR NEPHROSTOMY EXCHANGE CATHETER  10/28/2021    IR NEPHROSTOMY PERCUTANEOUS LEFT  9/16/2021    IR NEPHROSTOMY PERCUTANEOUS LEFT  9/16/2021    IR NEPHROSTOMY PERCUTANEOUS LEFT 9/16/2021 [facility]    IR REPLCE T CVC WO PORT SAME ACC  5/30/2019    IR TUBE CHANGE  8/5/2021    IR TUBE CHANGE  7/8/2021    IR TUBE CHANGE  6/14/2021    IR TUBE CHANGE  5/27/2021    IR TUNNELED CATHETER PLACEMENT GREATER THAN 5 YEARS  5/20/2019    IR TUNNELED CATHETER PLACEMENT GREATER THAN 5 YEARS  5/20/2019    IR TUNNELED CATHETER PLACEMENT GREATER THAN 5 YEARS 5/20/2019 [facility]    OTHER SURGICAL HISTORY Bilateral     cataracts  2017    OTHER SURGICAL HISTORY  10/7/2013    Roberto---endorectal us POLYPECTOMY      TOTAL COLECTOMY  2013    Roberto--lap LAR with coloproctostomy, mobilization splenic flexure, diverting loop ileostomy    TOTAL COLECTOMY Right 2014    for leak    VASCULAR SURGERY Left     single lumen port/subsequently removed      Family History   Problem Relation Age of Onset    Cancer Mother         colon and ovarian    Cancer Brother         prostate    Alcohol Abuse Brother     Cancer Maternal Grandmother         bone    Alcohol Abuse Father     Stroke Paternal Grandfather     Alcohol Abuse Sister      Social History     Tobacco Use    Smoking status: Former     Packs/day: 1.00     Types: Cigarettes     Quit date: 1963     Years since quittin.9    Smokeless tobacco: Never   Substance Use Topics    Alcohol use: Not Currently     Alcohol/week: 11.7 standard drinks      Current Facility-Administered Medications   Medication Dose Route Frequency Provider Last Rate Last Admin    amitriptyline (ELAVIL) tablet 50 mg  50 mg Oral Nightly Gen Ciesco, DO   50 mg at 10/17/22 2120    melatonin tablet 3 mg  3 mg Oral Nightly PRN Gen Ciesco, DO   3 mg at 10/17/22 2120    tuberculin injection 5 Units  5 Units IntraDERmal Once Tiffanie Wynn DO   5 Units at 10/17/22 172    carvedilol (COREG) tablet 6.25 mg  6.25 mg Oral BID WC Janice Swan MD   6.25 mg at 10/18/22 0900    clonazePAM (KLONOPIN) tablet 6 mg  6 mg Oral Nightly PRN Janice Swan MD   6 mg at 10/17/22 2120    famotidine (PEPCID) tablet 10 mg  10 mg Oral Daily Janice Swan MD   10 mg at 10/18/22 0900    levothyroxine (SYNTHROID) tablet 125 mcg  125 mcg Oral Daily Janice Swan MD   125 mcg at 10/18/22 0521    OLANZapine (ZYPREXA) tablet 7.5 mg  7.5 mg Oral Nightly Janice Swan MD   7.5 mg at 10/17/22 2131    sodium chloride flush 0.9 % injection 5-40 mL  5-40 mL IntraVENous 2 times per day Janice Swan MD   10 mL at 10/18/22 0901    sodium chloride flush 0.9 % injection 5-40 mL  5-40 mL IntraVENous PRN Janice Swan MD        0.9 % sodium chloride infusion   IntraVENous PRN Ann Abarca Ala, MD        enoxaparin (LOVENOX) injection 40 mg  40 mg SubCUTAneous Daily Janice Swan MD   40 mg at 10/18/22 0900    ondansetron (ZOFRAN-ODT) disintegrating tablet 4 mg  4 mg Oral Q8H PRN Ann Abarca Ala, MD        Or    ondansetron (ZOFRAN) injection 4 mg  4 mg IntraVENous Q6H PRN Janice Swan MD        polyethylene glycol (GLYCOLAX) packet 17 g  17 g Oral Daily PRN Ann Abarca Ala, MD        acetaminophen (TYLENOL) tablet 650 mg  650 mg Oral Q6H PRN Ann Abarca Ala, MD        Or    acetaminophen (TYLENOL) suppository 650 mg  650 mg Rectal Q6H PRN Ann Abarca Ala, MD        magnesium sulfate 2000 mg in 50 mL IVPB premix  2,000 mg IntraVENous PRN Ann Abarca Ala, MD        hyoscyamine (LEVSIN/SL) sublingual tablet 125 mcg  125 mcg SubLINGual Q6H PRN Janice Swan MD            No Known Allergies    Review of systems negative with exception of pertinent positives and negatives noted above.        Objective:     Vitals:    10/18/22 0013 10/18/22 0458 10/18/22 0743 10/18/22 1131   BP: 137/74 (!) 147/84 (!) 140/89 124/71   Pulse: 70 69 73 56   Resp: 16 16 18 16   Temp: 98.2 °F (36.8 °C) 98.2 °F (36.8 °C) 97.5 °F (36.4 °C) 97.9 °F (36.6 °C)   TempSrc: Oral Oral Oral Axillary   SpO2: 96% 94% 95% 98%   Weight:  121 lb 4.1 oz (55 kg)     Height:              Current Facility-Administered Medications:     amitriptyline (ELAVIL) tablet 50 mg, 50 mg, Oral, Nightly, Gen Ciesco, DO, 50 mg at 10/17/22 2120    melatonin tablet 3 mg, 3 mg, Oral, Nightly PRN, [address], DO, 3 mg at 10/17/22 2120    tuberculin injection 5 Units, 5 Units, IntraDERmal, Once, [address], DO, 5 Units at 10/17/22 1722    carvedilol (COREG) tablet 6.25 mg, 6.25 mg, Oral, BID WC, Janice Swan MD, 6.25 mg at 10/18/22 0900    clonazePAM (KLONOPIN) tablet 6 mg, 6 mg, Oral, Nightly PRN, Ann Abarca Ala, MD, 6 mg at 10/17/22 2120    famotidine (PEPCID) tablet 10 mg, 10 mg, Oral, Daily, Janice Swan MD, 10 mg at 10/18/22 0900 levothyroxine (SYNTHROID) tablet 125 mcg, 125 mcg, Oral, Daily, Otilio Daley Ala, MD, 125 mcg at 10/18/22 0521    OLANZapine (ZYPREXA) tablet 7.5 mg, 7.5 mg, Oral, Nightly, Janice Swan MD, 7.5 mg at 10/17/22 2131    sodium chloride flush 0.9 % injection 5-40 mL, 5-40 mL, IntraVENous, 2 times per day, Janice Swan MD, 10 mL at 10/18/22 0901    sodium chloride flush 0.9 % injection 5-40 mL, 5-40 mL, IntraVENous, PRN, Otilio Daley Ala, MD    0.9 % sodium chloride infusion, , IntraVENous, PRN, Otilio Daley Ala, MD    enoxaparin (LOVENOX) injection 40 mg, 40 mg, SubCUTAneous, Daily, Janice Swan MD, 40 mg at 10/18/22 0900    ondansetron (ZOFRAN-ODT) disintegrating tablet 4 mg, 4 mg, Oral, Q8H PRN **OR** ondansetron (ZOFRAN) injection 4 mg, 4 mg, IntraVENous, Q6H PRN, Otilio Daley Ala, MD    polyethylene glycol (GLYCOLAX) packet 17 g, 17 g, Oral, Daily PRN, Otilio Daley Ala, MD    acetaminophen (TYLENOL) tablet 650 mg, 650 mg, Oral, Q6H PRN **OR** acetaminophen (TYLENOL) suppository 650 mg, 650 mg, Rectal, Q6H PRN, Otilio Daley Ala, MD    magnesium sulfate 2000 mg in 50 mL IVPB premix, 2,000 mg, IntraVENous, PRN, Otilio Daley Ala, MD    hyoscyamine (LEVSIN/SL) sublingual tablet 125 mcg, 125 mcg, SubLINGual, Q6H PRN, Otilio Daley Ala, MD    No results found for this or any previous visit (from the past 12 hour(s)). CT Results (most recent):  CT reviewed. Results do not populate into note    Most recent MRI   No results found for this or any previous visit. Most recent MRA   No results found for this or any previous visit. Most recent CTA  No results found for this or any previous visit. Most recent Echo  No results found for this or any previous visit. Physical Exam:  General -: Borderline cachectic. Pleasant and conversant. Dysphonic  HEENT - Normocephalic, atraumatic. Conjunctiva, tympanic membranes, and oropharynx are clear. Neck - Supple without masses, no bruits   Extremities - Peripheral pulses intact. No edema and no rashes. Neurological examination - Comprehension, attention , memory and reasoning are intact. Language is normal.  Speech is at least moderately dysphonic. On cranial nerve examination pupils are equal round and reactive to light. Fundoscopic examination is normal. Visual acuity is adequate. Visual fields are full to finger confrontation. Extraocular motility is normal. Face is symmetric and sensation is intact to light touch. Hearing is intact to finger rustle bilaterally. Motor examination - There is normal muscle tone and bulk. Power is full throughout with exception to 2/5 strength of the iliopsoas and 4 -/5 strength of the quadriceps which is chronic. Muscle stretch reflexes are absent at the patella and Achilles, bilaterally. Sensation is intact to light touch, pinprick, vibration and proprioception in all extremities. He may have some mild proprioceptive deficits in the right lower limb. Cerebellar examination is normal.  Cannot ambulate or stand without significant help    Signed By: GERMAN Dallas     October 18, 2022     Neurology Physician S[facility] [address]    I have seen and examined the patient along with Kimmy Zapien NP. I agree with the documentation as recorded. In addition I would add:     I have seen the patient independently during which time I reviewed the history and performed a physical examination, reviewed the NP documentation and discussed with the NP . The interval history obtained is notable for: Agree as above    The physical examination performed is notable for: Overall physical exam appearance appears somewhat cachectic. I do not appreciate any tremor.   Asked the patient whether her voice was any different and he denied same however there is a scanning dysarthric quality to it    The medical decision making including assessment and recommendations is notable for: Awaiting imaging studies if these are unremarkable I would be inclined to at least give the patient a trial on immunoglobulin    Sandra Guerra MD

## 2022-10-18 NOTE — PROGRESS NOTES
Hospitalist Progress Note   Admit Date:  10/16/2022  8:25 AM   Name:  Tuyet oNlasco   Age:  76 y.o. Sex:  male  :  1945   MRN:  [id number]   Room:  [facility]/    Presenting Complaint: Fatigue     Reason(s) for Admission: Dehydration [E86.0]  Exertional dyspnea [R06.09]  Leg weakness, bilateral [R29.898]  Unable to ambulate [R26.2]  Decreased activities of daily living (ADL) [Z78.9]     Hospital Course:     Blanca Vazquez is a 76 y.o. 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 Patient states that he feels so weak that his legs gave out and is having balance issues with walking. Patient denies any fever chills chest pain cough shortness of breath. No palpitations. No nausea no vomiting no diarrhea. No abdominal pain. He denies any falls. ER course  Patient is afebrile, hemodynamically stable. ER physician reported that the patient's oxygen saturation dropped to 88% on room air and was placed on nasal cannula. CT head on admission negative. Patient admitted for further evaluation management as he is unable to ambulate without balance issues. Hospital Course: admitted for sudden onset lower leg weakness in setting of known rectal cancer in the past. MRI brain/spine pending. Neurology following. Needs      Subjective & 24hr Events (10/18/22): Patient seen and examined at bedside. No acute overnight events. Denies fevers/chills, chest pain or shortness of breath. Denies any changes to his legs, still with some weakness. No numbness/tingling. No headache or blurry vision. 10 point ROS negative except for HPI above.    Assessment & Plan:     Principal Problem:    Leg weakness, bilateral  - neurology following  - CT head upon admission is negative  - metabolic workup pending (paraneoplastic studies, B12, RPR)  - MRI brain and spine (C/T/L) ordered and pending  - limit anticholinergics  - decreased Elavil to 50 mg, plan to discontinue completely   - discontinue oxybutynin   - limit Levsin, if possible       Rectal cancer (HCC)  - needs oupatient follow-up  - workup as above    # Hypothyroidism  - Synthroid 125 mcg      Anticipated discharge needs:      PT/OT consults and PPD ordered. Will need SNF at discharge. Discussed with patient at bedside. All questions answered. Diet:  ADULT DIET; Regular; Low Fat/Low Chol/High Fiber/2 gm Na  DVT PPx: Lovenox   Code status: Full Code    Hospital Problems:  Principal Problem:    Leg weakness, bilateral  Active Problems:    Unable to ambulate    Rectal cancer (HCC)    Decreased activities of daily living (ADL)    Hypothyroidism  Resolved Problems:    * No resolved hospital problems. *      Objective:   Patient Vitals for the past 24 hrs:   Temp Pulse Resp BP SpO2   10/18/22 1131 97.9 °F (36.6 °C) 56 16 124/71 98 %   10/18/22 0743 97.5 °F (36.4 °C) 73 18 (!) 140/89 95 %   10/18/22 0458 98.2 °F (36.8 °C) 69 16 (!) 147/84 94 %   10/18/22 0013 98.2 °F (36.8 °C) 70 16 137/74 96 %   10/17/22 1927 99 °F (37.2 °C) 59 16 118/65 94 %   10/17/22 1604 98.4 °F (36.9 °C) 61 10 115/65 97 %         Oxygen Therapy  SpO2: 98 %  O2 Device: None (Room air)    Estimated body mass index is 18.17 kg/m² as calculated from the following:    Height as of an earlier encounter on 10/16/22: 5' 8.5\" (1.74 m). Weight as of this encounter: 121 lb 4.1 oz (55 kg). Intake/Output Summary (Last 24 hours) at 10/18/2022 1219  Last data filed at 10/18/2022 1156  Gross per 24 hour   Intake 750 ml   Output 2600 ml   Net -1850 ml           Physical Exam:     Blood pressure 124/71, pulse 56, temperature 97.9 °F (36.6 °C), temperature source Axillary, resp.  rate 16, height 5' 8.5\" (1.74 m), weight 121 lb 4.1 oz (55 kg), SpO2 98 %. General:    Well nourished. Head:  Normocephalic, atraumatic  Eyes:  Sclerae appear normal.  Pupils equally round. ENT:  Nares appear normal, no drainage. Moist oral mucosa  Neck:  No restricted ROM. Trachea midline   CV:   RRR. No jugular venous distension. Lungs:   CTAB. No wheezing, rhonchi, or rales. Symmetric expansion. Abdomen: Bowel sounds present. Soft, nontender, nondistended. Extremities: No cyanosis or clubbing. No edema  Skin:     No rashes and normal coloration. Warm and dry. Neuro:  CN II-XII grossly intact. Sensation intact throughout. Gait/balance assessment deferred. +5/5 muscle strength at the ankle. +4/5 muscle strength on hip flexion, stronger in L>R  Psych:  Normal mood and affect.       I have personally reviewed labs and tests showing:  Recent Labs:  Recent Results (from the past 48 hour(s))   POCT Glucose    Collection Time: 10/16/22  9:04 PM   Result Value Ref Range    POC Glucose 135 (H) 65 - 100 mg/dL    Performed by: [facility]    Comprehensive Metabolic Panel w/ Reflex to MG    Collection Time: 10/17/22  5:36 AM   Result Value Ref Range    Sodium 139 133 - 143 mmol/L    Potassium 3.9 3.5 - 5.1 mmol/L    Chloride 110 101 - 110 mmol/L    CO2 24 21 - 32 mmol/L    Anion Gap 5 2 - 11 mmol/L    Glucose 88 65 - 100 mg/dL    BUN 20 8 - 23 MG/DL    Creatinine 1.00 0.8 - 1.5 MG/DL    Est, Glom Filt Rate >60 >60 ml/min/1.73m2    Calcium 8.5 8.3 - 10.4 MG/DL    Total Bilirubin 0.4 0.2 - 1.1 MG/DL    ALT 24 12 - 65 U/L    AST 16 15 - 37 U/L    Alk Phosphatase 69 50 - 136 U/L    Total Protein 5.9 (L) 6.3 - 8.2 g/dL    Albumin 2.6 (L) 3.2 - 4.6 g/dL    Globulin 3.3 2.8 - 4.5 g/dL    Albumin/Globulin Ratio 0.8 0.4 - 1.6     CBC with Auto Differential    Collection Time: 10/17/22  5:36 AM   Result Value Ref Range    WBC 6.1 4.3 - 11.1 K/uL    RBC 3.98 (L) 4.23 - 5.6 M/uL    Hemoglobin 11.4 (L) 13.6 - 17.2 g/dL Hematocrit 36.1 (L) 41.1 - 50.3 %    MCV 90.7 82 - 102 FL    MCH 28.6 26.1 - 32.9 PG    MCHC 31.6 31.4 - 35.0 g/dL    RDW 13.2 11.9 - 14.6 %    Platelets 382 830 - 515 K/uL    MPV 9.2 (L) 9.4 - 12.3 FL    nRBC 0.00 0.0 - 0.2 K/uL    Differential Type AUTOMATED      Seg Neutrophils 82 (H) 43 - 78 %    Lymphocytes 5 (L) 13 - 44 %    Monocytes 11 4.0 - 12.0 %    Eosinophils % 1 0.5 - 7.8 %    Basophils 1 0.0 - 2.0 %    Immature Granulocytes 0 0.0 - 5.0 %    Segs Absolute 5.0 1.7 - 8.2 K/UL    Absolute Lymph # 0.3 (L) 0.5 - 4.6 K/UL    Absolute Mono # 0.7 0.1 - 1.3 K/UL    Absolute Eos # 0.1 0.0 - 0.8 K/UL    Basophils Absolute 0.0 0.0 - 0.2 K/UL    Absolute Immature Granulocyte 0.0 0.0 - 0.5 K/UL   POCT Glucose    Collection Time: 10/17/22  7:28 AM   Result Value Ref Range    POC Glucose 86 65 - 100 mg/dL    Performed by: Fatimah Lopes    RPR Reflex to Titer and TPPA    Collection Time: 10/17/22  9:57 AM   Result Value Ref Range    RPR NONREACTIVE NR     Miscellaneous Sendout    Collection Time: 10/17/22 11:23 AM   Result Value Ref Range    Test Description: [place] ENS1 PANEL     Reference Lab [facility]      Results: PENDING    POCT Glucose    Collection Time: 10/17/22 11:25 AM   Result Value Ref Range    POC Glucose 107 (H) 65 - 100 mg/dL    Performed by: [employer]    POCT Glucose    Collection Time: 10/17/22  4:05 PM   Result Value Ref Range    POC Glucose 91 65 - 100 mg/dL    Performed by: Fatimah Abbot    POCT Glucose    Collection Time: 10/17/22  8:59 PM   Result Value Ref Range    POC Glucose 97 65 - 100 mg/dL    Performed by: Salina Castro        I have personally reviewed imaging studies showing: Other Studies:  CT HEAD WO CONTRAST   Final Result   No acute findings         XR CHEST (2 VW)   Final Result   No acute cardiopulmonary process.       MRI BRAIN W WO CONTRAST    (Results Pending)   MRI CERVICAL SPINE W WO CONTRAST    (Results Pending)   MRI LUMBAR SPINE W WO CONTRAST    (Results Pending) MRI THORACIC SPINE W WO CONTRAST    (Results Pending)       Current Meds:  Current Facility-Administered Medications   Medication Dose Route Frequency    amitriptyline (ELAVIL) tablet 50 mg  50 mg Oral Nightly    melatonin tablet 3 mg  3 mg Oral Nightly PRN    tuberculin injection 5 Units  5 Units IntraDERmal Once    carvedilol (COREG) tablet 6.25 mg  6.25 mg Oral BID WC    clonazePAM (KLONOPIN) tablet 6 mg  6 mg Oral Nightly PRN    famotidine (PEPCID) tablet 10 mg  10 mg Oral Daily    levothyroxine (SYNTHROID) tablet 125 mcg  125 mcg Oral Daily    OLANZapine (ZYPREXA) tablet 7.5 mg  7.5 mg Oral Nightly    sodium chloride flush 0.9 % injection 5-40 mL  5-40 mL IntraVENous 2 times per day    sodium chloride flush 0.9 % injection 5-40 mL  5-40 mL IntraVENous PRN    0.9 % sodium chloride infusion   IntraVENous PRN    enoxaparin (LOVENOX) injection 40 mg  40 mg SubCUTAneous Daily    ondansetron (ZOFRAN-ODT) disintegrating tablet 4 mg  4 mg Oral Q8H PRN    Or    ondansetron (ZOFRAN) injection 4 mg  4 mg IntraVENous Q6H PRN    polyethylene glycol (GLYCOLAX) packet 17 g  17 g Oral Daily PRN    acetaminophen (TYLENOL) tablet 650 mg  650 mg Oral Q6H PRN    Or    acetaminophen (TYLENOL) suppository 650 mg  650 mg Rectal Q6H PRN    magnesium sulfate 2000 mg in 50 mL IVPB premix  2,000 mg IntraVENous PRN    hyoscyamine (LEVSIN/SL) sublingual tablet 125 mcg  125 mcg SubLINGual Q6H PRN       Signed:  BRENT RAMOS DO    Part of this note may have been written by using a voice dictation software. The note has been proof read but may still contain some grammatical/other typographical errors.

## 2022-10-18 NOTE — PROGRESS NOTES
ACUTE OCCUPATIONAL THERAPY GOALS:   (Developed with and agreed upon by patient and/or caregiver.)  1. Patient will complete lower body bathing and dressing with modified independence and adaptive equipment as needed. 2. Patient will complete toileting with modified independence. 3. Patient will tolerate 30 minutes of OT treatment with 1-2 rest breaks to increase activity tolerance for ADLs. 4. Patient will complete functional transfers with modified independence and adaptive equipment as needed. 5. Patient will complete functional mobility of household distances with modified independence and appropriate safety awareness. Timeframe: 7 visits      OCCUPATIONAL THERAPY: Daily Note AM   OT Visit Days: 2   Time  OT Charge Capture  Rehab Caseload Tracker  OT Orders    Christiano Montalvo is a 76 y.o. male   PRIMARY DIAGNOSIS: Leg weakness, bilateral  Dehydration [E86.0]  Exertional dyspnea [R06.09]  Leg weakness, bilateral [R29.898]  Unable to ambulate [R26.2]  Decreased activities of daily living (ADL) [Z78.9]       Inpatient: Payor: Angelica Jeffix / Plan: MEDICARE PART A AND B / Product Type: *No Product type* /     ASSESSMENT:     REHAB RECOMMENDATIONS: CURRENT LEVEL OF FUNCTION:  (Most Recently Demonstrated)   Recommendation to date pending progress:  Setting:  Inpatient Rehab Facility    Equipment:    To Be Determined Bathing:  Not Tested  Dressing:  Not Tested  Feeding/Grooming:  Minimal Assist- CGA brushing teeth at sink   Toileting:  Not Tested  Functional Mobility:  Minimal Assist- CGA x 2 with RW      ASSESSMENT:  Mr. Mg العراقي was supine in bed upon arrival. Pt denies pain. Pt reports standing alone at the EOB to perform exercises and was educated on the risk of falling due to  strength and balance in LE. Pt was SBA for bed mobility. Pt transferred sit to stand and performed functional mobility with CGA-Min A x 2 with RW.  Pt brushed teeth while standing at the sink with CGA-Min A x 2 and increased time. Pt had decreased B fine motor speed and accuracy during ADL. Pt tolerated standing to complete UB and LB exercises with CGA-Min A x 2 for balance. Pt required verbal cues to not bump into objects on the right side during functional mobility. Pt reports having to stop and scan environment when he is out in the community. Pt vision was assessed and R visual fields were about 70 degrees and L eye appeared to be shaky and slowed during saccades and tracking. Pt left sitting in the chair with alarm activated, call bell, and needs within reach. Pt made progress towards functional transfer, activity tolerance, and functional mobility goals. Pt continues to be limited by decreased strength and balance which impact ability to be safe and independent with ADL and functional mobility. OT is medically necessary and will address goals and plan of care.         SUBJECTIVE:     Mr. Melita Moseley states, \"I hope they figure out what is going on with my legs\"     Social/Functional Lives With: Alone  Type of Home: House  Home Layout: Two level  Bathroom Shower/Tub: Tub/Shower unit  ADL Assistance: Independent  Homemaking Assistance: Independent  Active : Yes    OBJECTIVE:     Giuseppe Pandya / Leidy Rivera / Ailin Burows: Colostomy and John Catheter    RESTRICTIONS/PRECAUTIONS:  Restrictions/Precautions  Restrictions/Precautions: Fall Risk        PAIN: VITALS / O2:   Pre Treatment:   Pain Assessment: None - Denies Pain        Post Treatment: Same  Vitals          Oxygen        MOBILITY: I Mod I S SBA CGA Min Mod Max Total  NT x2 Comments:   Bed Mobility    Rolling [] [] [] [] [] [] [] [] [] [x] []    Supine to Sit [] [] [] [x] [] [] [] [] [] [] []    Scooting [] [] [] [x] [] [] [] [] [] [] []    Sit to Supine [] [] [] [] [] [] [] [] [] [x] []    Transfers    Sit to Stand [] [] [] [] [x] [x] [] [] [] [] [x]    Bed to Chair [] [] [] [] [x] [x] [] [] [] [] [x]    Stand to Sit [] [] [] [] [x] [x] [] [] [] [] [x]    Tub/Shower [] [] [] [] [] [] [] [] [] [x] []     Toilet [] [] [] [] [] [] [] [] [] [x] []      [] [] [] [] [] [] [] [] [] [] []    I=Independent, Mod I=Modified Independent, S=Supervision/Setup, SBA=Standby Assistance, CGA=Contact Guard Assistance, Min=Minimal Assistance, Mod=Moderate Assistance, Max=Maximal Assistance, Total=Total Assistance, NT=Not Tested    ACTIVITIES OF DAILY LIVING: I Mod I S SBA CGA Min Mod Max Total NT Comments   BASIC ADLs:              Upper Body   Bathing [] [] [] [] [] [] [] [] [] [x]    Lower Body Bathing [] [] [] [] [] [] [] [] [] [x]    Toileting [] [] [] [] [] [] [] [] [] [x]    Upper Body Dressing [] [] [] [] [] [] [] [] [] [x]    Lower Body Dressing [] [] [] [] [] [] [] [] [] [x]    Feeding [] [] [] [] [] [] [] [] [] [x]    Grooming [] [] [] [] [x] [x] [] [] [] [] Standing at the sink to brush teeth    Personal Device Care [] [] [] [] [] [] [] [] [] [x]    Functional Mobility [] [] [] [] [x] [x] [] [] [] [] X 2 with RW    I=Independent, Mod I=Modified Independent, S=Supervision/Setup, SBA=Standby Assistance, CGA=Contact Guard Assistance, Min=Minimal Assistance, Mod=Moderate Assistance, Max=Maximal Assistance, Total=Total Assistance, NT=Not Tested    BALANCE: Good Fair+ Fair Fair- Poor NT Comments   Sitting Static [x] [] [] [] [] []    Sitting Dynamic [x] [] [] [] [] []              Standing Static [] [x] [] [] [] []    Standing Dynamic [] [] [x] [] [] []        PLAN:     FREQUENCY/DURATION   OT Plan of Care: 3 times/week for duration of hospital stay or until stated goals are met, whichever comes first.    TREATMENT:     TREATMENT:   Therapeutic Activity (23 Minutes): Therapeutic activity included Supine to Sit, Scooting, Transfer Training, Sitting balance , and Standing balance to improve functional Activity tolerance, Balance, Coordination, Strength, and ROM.   Self Care (15 minutes): Patient participated in grooming ADLs in standing with minimal manual cueing to increase independence, decrease assistance required, and increase activity tolerance. Patient also participated in functional mobility and functional transfer training to increase independence, decrease assistance required, and increase activity tolerance.      TREATMENT GRID:  N/A    AFTER TREATMENT PRECAUTIONS: Alarm Activated, Bed/Chair Locked, Call light within reach, Chair, and Needs within reach    INTERDISCIPLINARY COLLABORATION:  RN/ PCT, PT/ PTA, OT/ FARNSWORTH, and OTS    EDUCATION:       TOTAL TREATMENT DURATION AND TIME:  Time In: 0951  Time Out: [address] [place]  Minutes: [facility], \[facility] [place] [facility]\""

## 2022-10-18 NOTE — PROGRESS NOTES
Hourly rounds in progress. Alert,oriented. Slept well after medications. Denies needs or pain at this time. Bed in low locked position. Call light within reach. Bed alarm on. Will continue to monitor and give report to oncoming day shift nurse.

## 2022-10-19 ENCOUNTER — APPOINTMENT (OUTPATIENT)
Dept: MRI IMAGING | Age: 77
DRG: 556 | End: 2022-10-19
Payer: MEDICARE

## 2022-10-19 ENCOUNTER — APPOINTMENT (OUTPATIENT)
Dept: PHYSICAL THERAPY | Age: 77
End: 2022-10-19
Payer: MEDICARE

## 2022-10-19 LAB
MM INDURATION, POC: 0 MM (ref 0–5)
PPD, POC: NEGATIVE

## 2022-10-19 PROCEDURE — 1100000000 HC RM PRIVATE

## 2022-10-19 PROCEDURE — 99231 SBSQ HOSP IP/OBS SF/LOW 25: CPT | Performed by: PHYSICAL MEDICINE & REHABILITATION

## 2022-10-19 PROCEDURE — 72157 MRI CHEST SPINE W/O & W/DYE: CPT

## 2022-10-19 PROCEDURE — 95819 EEG AWAKE AND ASLEEP: CPT

## 2022-10-19 PROCEDURE — 6370000000 HC RX 637 (ALT 250 FOR IP): Performed by: INTERNAL MEDICINE

## 2022-10-19 PROCEDURE — 99232 SBSQ HOSP IP/OBS MODERATE 35: CPT | Performed by: PSYCHIATRY & NEUROLOGY

## 2022-10-19 PROCEDURE — 99225 PR SBSQ OBSERVATION CARE/DAY 25 MINUTES: CPT | Performed by: PSYCHIATRY & NEUROLOGY

## 2022-10-19 PROCEDURE — 6360000004 HC RX CONTRAST MEDICATION: Performed by: INTERNAL MEDICINE

## 2022-10-19 PROCEDURE — APPSS45 APP SPLIT SHARED TIME 31-45 MINUTES: Performed by: NURSE PRACTITIONER

## 2022-10-19 PROCEDURE — 2580000003 HC RX 258: Performed by: INTERNAL MEDICINE

## 2022-10-19 PROCEDURE — 2580000003 HC RX 258: Performed by: HOSPITALIST

## 2022-10-19 PROCEDURE — 6360000002 HC RX W HCPCS: Performed by: HOSPITALIST

## 2022-10-19 PROCEDURE — 6370000000 HC RX 637 (ALT 250 FOR IP): Performed by: HOSPITALIST

## 2022-10-19 PROCEDURE — 6370000000 HC RX 637 (ALT 250 FOR IP): Performed by: NURSE PRACTITIONER

## 2022-10-19 PROCEDURE — A9579 GAD-BASE MR CONTRAST NOS,1ML: HCPCS | Performed by: INTERNAL MEDICINE

## 2022-10-19 PROCEDURE — 6360000002 HC RX W HCPCS: Performed by: NURSE PRACTITIONER

## 2022-10-19 RX ORDER — DIPHENHYDRAMINE HCL 25 MG
25 CAPSULE ORAL DAILY
Status: COMPLETED | OUTPATIENT
Start: 2022-10-19 | End: 2022-10-23

## 2022-10-19 RX ORDER — ACETAMINOPHEN 500 MG
1000 TABLET ORAL DAILY
Status: COMPLETED | OUTPATIENT
Start: 2022-10-19 | End: 2022-10-23

## 2022-10-19 RX ORDER — SODIUM CHLORIDE 0.9 % (FLUSH) 0.9 %
10 SYRINGE (ML) INJECTION AS NEEDED
Status: DISCONTINUED | OUTPATIENT
Start: 2022-10-19 | End: 2022-10-19

## 2022-10-19 RX ADMIN — IMMUNE GLOBULIN (HUMAN) 20 G: 10 INJECTION INTRAVENOUS; SUBCUTANEOUS at 17:46

## 2022-10-19 RX ADMIN — ACETAMINOPHEN 1000 MG: 500 TABLET, FILM COATED ORAL at 16:28

## 2022-10-19 RX ADMIN — AMITRIPTYLINE HYDROCHLORIDE 50 MG: 50 TABLET, FILM COATED ORAL at 20:55

## 2022-10-19 RX ADMIN — Medication 3 MG: at 20:55

## 2022-10-19 RX ADMIN — ENOXAPARIN SODIUM 40 MG: 100 INJECTION SUBCUTANEOUS at 08:33

## 2022-10-19 RX ADMIN — CARVEDILOL 6.25 MG: 6.25 TABLET, FILM COATED ORAL at 08:32

## 2022-10-19 RX ADMIN — LEVOTHYROXINE SODIUM 125 MCG: 0.12 TABLET ORAL at 05:02

## 2022-10-19 RX ADMIN — FAMOTIDINE 10 MG: 20 TABLET, FILM COATED ORAL at 08:32

## 2022-10-19 RX ADMIN — SODIUM CHLORIDE, PRESERVATIVE FREE 10 ML: 5 INJECTION INTRAVENOUS at 09:16

## 2022-10-19 RX ADMIN — SODIUM CHLORIDE, PRESERVATIVE FREE 10 ML: 5 INJECTION INTRAVENOUS at 20:55

## 2022-10-19 RX ADMIN — SODIUM CHLORIDE, PRESERVATIVE FREE 10 ML: 5 INJECTION INTRAVENOUS at 16:04

## 2022-10-19 RX ADMIN — GADOTERIDOL 10 ML: 279.3 INJECTION, SOLUTION INTRAVENOUS at 16:04

## 2022-10-19 RX ADMIN — CLONAZEPAM 6 MG: 1 TABLET ORAL at 20:54

## 2022-10-19 RX ADMIN — DIPHENHYDRAMINE HYDROCHLORIDE 25 MG: 25 CAPSULE ORAL at 16:29

## 2022-10-19 RX ADMIN — OLANZAPINE 7.5 MG: 5 TABLET, FILM COATED ORAL at 20:54

## 2022-10-19 RX ADMIN — CARVEDILOL 6.25 MG: 6.25 TABLET, FILM COATED ORAL at 16:35

## 2022-10-19 ASSESSMENT — PAIN DESCRIPTION - ORIENTATION: ORIENTATION: ANTERIOR

## 2022-10-19 ASSESSMENT — PAIN DESCRIPTION - LOCATION: LOCATION: ABDOMEN

## 2022-10-19 ASSESSMENT — PAIN DESCRIPTION - DESCRIPTORS: DESCRIPTORS: ACHING

## 2022-10-19 ASSESSMENT — PAIN SCALES - GENERAL: PAINLEVEL_OUTOF10: 1

## 2022-10-19 NOTE — PROGRESS NOTES
Neurology Daily Progress Note     Assessment:     53-year-old man with inability to ambulate. On neurological examination, he has at least moderate dysphonia. He states that this is from amitriptyline; however, his dysphonia seems out of proportion to dry mouth from anticholinergic agents. In addition, he is areflexic in his lower extremities. His brain MRI shows an area of T2 signal abnormality in the left parieto-occipital junction. The differential is broad, including inflammatory, autoimmune, and paraneoplastic causes. Will plan for LP   Plan:     The patient was on multiple anticholinergic medications. These have been reduced or discontinued. This may be contributing, at least in part, to patients balance issues. PT/OT    EEG    LP under fluoro. Patient had Lovenox today. This has been held. LP cannot be done until Friday     Intravenous immunoglobulin 2 grams/kg divided over 5 days (0.4 g/kg/day x 5 days). Pretreat with antihistamine and Tylenol 30 minutes prior to infusion. Subjective: Interval history:    Patient unchanged clinically. Discouraged he cannot get up out of bed. MRI shows T2 abnormality at left parieto-occipital junction. Anti hu, ri, Yo labs neg. History:    Uzma Brooks is a 76 y.o. male who is being seen for imbalance.     Past Medical History:   Diagnosis Date    Acute deep vein thrombosis (DVT) of non-extremity vein 5/20/2019    Family history of malignant neoplasm of gastrointestinal tract     mother colon/ovarian cancer 67    Fecal incontinence     LAR syndrome    John catheter in place 2022    patient stated- \"catheter due to them nicking his bladder and it won't heal\"    Former cigarette smoker     History of rectal cancer     Hypothyroid     stable w/med    Infection and inflammatory reaction due to other internal prosthetic devices, implants and grafts, subsequent encounter 2017    abd wound/mesh colostomy    Insomnia     takes meds    Major depression Osteoarthritis, hand     Personal history of colonic polyps 9/2013    x 1    Personal history of malignant neoplasm of rectum, rectosigmoid junction, 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    surgery and radiation    Psychiatric disorder     anxiety     Past Surgical History:   Procedure Laterality Date    COLONOSCOPY  last 2/3/15    Roberto--no polyps--3 year recall    COLONOSCOPY N/A 3/5/2021    COLONOSCOPY/BMI 19 performed by Claudetta Mocha, MD at [facility] ENDOSCOPY    COLONOSCOPY N/A 2/12/2018    COLONOSCOPY / BMI=21 performed by Teresa Wynn MD at [place] [id number]  2/12/2018         COLONOSCOPY THRU STOMA,LESN RMVL W/SNARE  3/5/2021         COLOSTOMY  5/11/16    CT RETROPERITONEAL PERC DRAIN  5/13/2021    CT RETROPERITONEAL PERC DRAIN 5/13/2021 [facility] CT SCAN    CT RETROPERITONEAL PERC DRAIN  5/20/2021    CT RETROPERITONEAL PERC DRAIN 5/20/2021 [facility] CT SCAN    GI  4/2016    Sacral nerve stimlator lead trial (unsucessful) and removal    HERNIA REPAIR      and Colostomy in May    HERNIA REPAIR  3/2015, 5/2016    IR ABSCESS EVAL THRU EXISTING CATH  11/24/2021    IR ABSCESS EVAL THRU EXISTING CATH  6/4/2021    IR ABSCESS EVAL THRU EXISTING CATH  5/20/2021    IR NEPHROSTOGRAM EXISTING ACCESS  12/10/2021    IR NEPHROSTOGRAM EXISTING ACCESS  10/4/2021    IR NEPHROSTOMY EXCHANGE CATHETER  12/7/2021    IR NEPHROSTOMY EXCHANGE CATHETER  11/3/2021    IR NEPHROSTOMY EXCHANGE CATHETER  10/28/2021    IR NEPHROSTOMY PERCUTANEOUS LEFT  9/16/2021    IR NEPHROSTOMY PERCUTANEOUS LEFT  9/16/2021    IR NEPHROSTOMY PERCUTANEOUS LEFT 9/16/2021 [facility]    IR REPLCE T CVC WO PORT SAME ACC  5/30/2019    IR TUBE CHANGE  8/5/2021    IR TUBE CHANGE  7/8/2021    IR TUBE CHANGE  6/14/2021    IR TUBE CHANGE  5/27/2021    IR TUNNELED CATHETER PLACEMENT GREATER THAN 5 YEARS  5/20/2019    IR TUNNELED CATHETER PLACEMENT GREATER THAN 5 YEARS  5/20/2019    IR TUNNELED CATHETER PLACEMENT GREATER THAN 5 YEARS 2019 [facility]    OTHER SURGICAL HISTORY Bilateral     cataracts  2017    OTHER SURGICAL HISTORY  10/7/2013    Roberto---endorectal us    POLYPECTOMY      TOTAL COLECTOMY  2013    Roberto--lap LAR with coloproctostomy, mobilization splenic flexure, diverting loop ileostomy    TOTAL COLECTOMY Right 2014    for leak    VASCULAR SURGERY Left     single lumen port/subsequently removed      Family History   Problem Relation Age of Onset    Cancer Mother         colon and ovarian    Cancer Brother         prostate    Alcohol Abuse Brother     Cancer Maternal Grandmother         bone    Alcohol Abuse Father     Stroke Paternal Grandfather     Alcohol Abuse Sister      Social History     Tobacco Use    Smoking status: Former     Packs/day: 1.00     Types: Cigarettes     Quit date: 1963     Years since quittin.9    Smokeless tobacco: Never   Substance Use Topics    Alcohol use: Not Currently     Alcohol/week: 11.7 standard drinks      Current Facility-Administered Medications   Medication Dose Route Frequency Provider Last Rate Last Admin    amitriptyline (ELAVIL) tablet 50 mg  50 mg Oral Nightly Gen Ciesco, DO   50 mg at 10/18/22 2137    melatonin tablet 3 mg  3 mg Oral Nightly PRN Gen Ciesco, DO   3 mg at 10/18/22 2137    carvedilol (COREG) tablet 6.25 mg  6.25 mg Oral BID WC Janice Sawn MD   6.25 mg at 10/19/22 9572    clonazePAM (KLONOPIN) tablet 6 mg  6 mg Oral Nightly PRN Janice Swan MD   6 mg at 10/18/22 2137    famotidine (PEPCID) tablet 10 mg  10 mg Oral Daily Janice Swan MD   10 mg at 10/19/22 6623    levothyroxine (SYNTHROID) tablet 125 mcg  125 mcg Oral Daily Janice Swan MD   125 mcg at 10/19/22 0502    OLANZapine (ZYPREXA) tablet 7.5 mg  7.5 mg Oral Nightly Janice Swan MD   7.5 mg at 10/18/22 2137    sodium chloride flush 0.9 % injection 5-40 mL  5-40 mL IntraVENous 2 times per day Janice Swan MD   10 mL at 10/19/22 0916    sodium chloride flush 0.9 % injection 5-40 mL 5-40 mL IntraVENous PRN Janice Swan MD        0.9 % sodium chloride infusion   IntraVENous PRN Eyad Stokes Ala, MD        [Held by provider] enoxaparin (LOVENOX) injection 40 mg  40 mg SubCUTAneous Daily Janice Swan MD   40 mg at 10/19/22 0833    ondansetron (ZOFRAN-ODT) disintegrating tablet 4 mg  4 mg Oral Q8H PRN Eyad Stokes Ala, MD        Or    ondansetron (ZOFRAN) injection 4 mg  4 mg IntraVENous Q6H PRN Janice Swan MD        polyethylene glycol (GLYCOLAX) packet 17 g  17 g Oral Daily PRN Eyad Stokes Ala, MD        acetaminophen (TYLENOL) tablet 650 mg  650 mg Oral Q6H PRN Eyad Stokes Ala, MD        Or    acetaminophen (TYLENOL) suppository 650 mg  650 mg Rectal Q6H PRN Eyad Stokes Ala, MD        magnesium sulfate 2000 mg in 50 mL IVPB premix  2,000 mg IntraVENous PRN Eyad Stokes Ala, MD        hyoscyamine (LEVSIN/SL) sublingual tablet 125 mcg  125 mcg SubLINGual Q6H PRN Janice Swan MD            No Known Allergies    Review of systems negative with exception of pertinent positives and negatives noted above.        Objective:     Vitals:    10/19/22 0007 10/19/22 0543 10/19/22 0805 10/19/22 1219   BP: 127/75 126/79 (!) 143/82 110/73   Pulse: 61 65 73 60   Resp: 16 16 16    Temp: 98.2 °F (36.8 °C) 97.5 °F (36.4 °C) 97.9 °F (36.6 °C) 98.1 °F (36.7 °C)   TempSrc: Oral Oral Oral Oral   SpO2: 93% 91% 96% 98%   Weight:  123 lb 7.3 oz (56 kg)     Height:              Current Facility-Administered Medications:     amitriptyline (ELAVIL) tablet 50 mg, 50 mg, Oral, Nightly, Gen Ayalaesco, DO, 50 mg at 10/18/22 2137    melatonin tablet 3 mg, 3 mg, Oral, Nightly PRN, Veronica Ramires, DO, 3 mg at 10/18/22 2137    carvedilol (COREG) tablet 6.25 mg, 6.25 mg, Oral, BID WC, Janice Swan MD, 6.25 mg at 10/19/22 0832    clonazePAM (KLONOPIN) tablet 6 mg, 6 mg, Oral, Nightly PRN, Eyad Stokes Ala, MD, 6 mg at 10/18/22 2137    famotidine (PEPCID) tablet 10 mg, 10 mg, Oral, Daily, Janice Swan MD, 10 mg at 10/19/22 7034    levothyroxine (SYNTHROID) tablet 125 mcg, 125 mcg, Oral, Daily, Elliot Garcia Ala, MD, 125 mcg at 10/19/22 0502    OLANZapine (ZYPREXA) tablet 7.5 mg, 7.5 mg, Oral, Nightly, Janice Swan MD, 7.5 mg at 10/18/22 2137    sodium chloride flush 0.9 % injection 5-40 mL, 5-40 mL, IntraVENous, 2 times per day, Janice Swan MD, 10 mL at 10/19/22 0916    sodium chloride flush 0.9 % injection 5-40 mL, 5-40 mL, IntraVENous, PRN, Elliot Garcia Ala, MD    0.9 % sodium chloride infusion, , IntraVENous, PRN, Elliot Garcia Ala, MD    [Held by provider] enoxaparin (LOVENOX) injection 40 mg, 40 mg, SubCUTAneous, Daily, Elliot Garcia Ala, MD, 40 mg at 10/19/22 0833    ondansetron (ZOFRAN-ODT) disintegrating tablet 4 mg, 4 mg, Oral, Q8H PRN **OR** ondansetron (ZOFRAN) injection 4 mg, 4 mg, IntraVENous, Q6H PRN, Elliot Garcia Ala, MD    polyethylene glycol (GLYCOLAX) packet 17 g, 17 g, Oral, Daily PRN, Elliot Garcia Ala, MD    acetaminophen (TYLENOL) tablet 650 mg, 650 mg, Oral, Q6H PRN **OR** acetaminophen (TYLENOL) suppository 650 mg, 650 mg, Rectal, Q6H PRN, Elliot Garcia Ala, MD    magnesium sulfate 2000 mg in 50 mL IVPB premix, 2,000 mg, IntraVENous, PRN, Elliot Garcia Ala, MD    hyoscyamine (LEVSIN/SL) sublingual tablet 125 mcg, 125 mcg, SubLINGual, Q6H PRN, Elliot Garcia Ala, MD    No results found for this or any previous visit (from the past 12 hour(s)). CT Results (most recent):  CT reviewed. Results do not populate into note    Most recent MRI   No results found for this or any previous visit. Most recent MRA   No results found for this or any previous visit. Most recent CTA  No results found for this or any previous visit. Most recent Echo  No results found for this or any previous visit. Physical Exam:  General -: Borderline cachectic. Pleasant and conversant. Dysphonic  HEENT - Normocephalic, atraumatic. Conjunctiva, tympanic membranes, and oropharynx are clear. Neck - Supple without masses, no bruits   Extremities - Peripheral pulses intact. No edema and no rashes.       Neurological examination - Comprehension, attention , memory and reasoning are intact. Language is normal.  Speech is at least moderately dysphonic. On cranial nerve examination pupils are equal round and reactive to light. Fundoscopic examination is normal. Visual acuity is adequate. Visual fields are full to finger confrontation. Extraocular motility is normal. Face is symmetric and sensation is intact to light touch. Hearing is intact to finger rustle bilaterally. Motor examination - There is normal muscle tone and bulk. Power is full throughout with exception to 2/5 strength of the iliopsoas and 4 -/5 strength of the quadriceps which is chronic. Muscle stretch reflexes are absent at the patella and Achilles, bilaterally. Sensation is intact to light touch, pinprick, vibration and proprioception in all extremities. He may have some mild proprioceptive deficits in the right lower limb. Cerebellar examination is normal.  Cannot ambulate or stand without significant help    Signed By: GERMAN Johnson     October 19, 2022       Neurology Physician S[facility] [address]    I have seen and examined the patient along with Dom Ferrer NP. I agree with the documentation as recorded. In addition I would add:     I have seen the patient independently during which time I reviewed the history and performed a physical examination, reviewed the NP documentation and discussed with the NP . The interval history obtained is notable for: No real change he is getting frustrated in terms of lack of progress this discussed  I have gone over the MRI finding with him    The physical examination performed is notable for: Persistent scanning dysarthria    The medical decision making including assessment and recommendations is notable for:  We need to wait until Friday for lumbar puncture in terms of discontinuing his anticoagulant prophylaxis for DVT in the meantime I believe it is reasonable to start him on IVIG and that this covers therapeutics for autoimmune and paraneoplastic entities which encompasses substantial amount of the differential diagnosis here. We are also however excluding prion mediated disorder.   The onset of his ataxia certainly suggests paraneoplastic or potentially that disorder and clearly the former is treatable and so we will initiate treatment plan in the event there is no response to an initial course of IVIG will not be repeated    Sandra Guerra MD

## 2022-10-19 NOTE — CONSULTS
[facility]  Tisha Martinez MD  Medical Director  [place], [address] [facility]  Tel: [phone]    Physical Medicine & Rehab Consult       Admit Date: 10/16/2022  Referring Provider: Hospitalist service     Medical Decision Making / Plan / Recommendations: Medical chart reviewed. Radha Porter is a 76 y.o. white male whom we have been requested to evaluate for consideration for inpatient rehabilitation. Consult request acknowledged; EMR reviewed in conjunction with [facility]etal  Director. Patient with PMH including rectal cancer s/p sigmoidectomy with colostomy and s/p urostomy (collateral bladder damage from XRT / surgery), past sepsis, hypothyroidism and anxiety. He was admitted 10/16/2022 by Hospitalist service from the ED with worsening fatigue, new B LE weakness and dysequilibrium. Neurology was consulted and has launched an extensive work-up. Physical and Occupational Therapy evaluations noted mobility and self-care deficits. Patient will be formally evaluated by Physiatry team once work-up has progressed further. We will continue to follow. Thank you for the opportunity to participate in the care of this patient. Espinoza Fonseca PA-C  Physician Assistant with [facility]  Tisha Martinez MD, Medical Director

## 2022-10-19 NOTE — PROGRESS NOTES
Hospitalist Progress Note   Admit Date:  10/16/2022  8:25 AM   Name:  Janes Nolasco   Age:  76 y.o. Sex:  male  :  1945   MRN:  [id number]   Room:  [facility]    Presenting Complaint: Fatigue     Reason(s) for Admission: Dehydration [E86.0]  Exertional dyspnea [R06.09]  Leg weakness, bilateral [R29.898]  Unable to ambulate [R26.2]  Decreased activities of daily living (ADL) [Z78.9]     Hospital Course:     Meryl Perrin is a 76 y.o. 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 Patient states that he feels so weak that his legs gave out and is having balance issues with walking. Patient denies any fever chills chest pain cough shortness of breath. No palpitations. No nausea no vomiting no diarrhea. No abdominal pain. He denies any falls. ER course  Patient is afebrile, hemodynamically stable. ER physician reported that the patient's oxygen saturation dropped to 88% on room air and was placed on nasal cannula. CT head on admission negative. Patient admitted for further evaluation management as he is unable to ambulate without balance issues. Hospital Course: admitted for sudden onset lower leg weakness in setting of known rectal cancer in the past. MRI brain/spine ordered per neuro recs. Subjective & 24hr Events (10/19/22): Pt feeling OK. Can't walk without walker, denies changes. No other complaints      Assessment & Plan:       Leg weakness, bilateral  - neurology following; defer workup and ordered studies to them  -MRIs and other workup pending  -physiatrist consulted per therapy recs. Neurology could continue to follow there and complete workup/treatment. This is ok from my standpoint   -if MRIs negative, neuro planning IVIG which would prolong stay, but this can be done on 9th floor also.  -cont PT/OT  - limit anticholinergics; discontinued and don't resume at DC  - decreased Elavil to 50 mg, plan to wean off eventually  - discontinue oxybutynin   - limit Levsin, if possible       Rectal cancer (HCC)  - oupatient follow-up    # Hypothyroidism  - Synthroid 125 mcg      Diet:  ADULT DIET; Regular; Low Fat/Low Chol/High Fiber/2 gm Na  DVT PPx: Lovenox   Code status: Full Code    Hospital Problems:  Principal Problem:    Leg weakness, bilateral  Active Problems:    Unable to ambulate    Rectal cancer (HCC)    Decreased activities of daily living (ADL)    Hypothyroidism  Resolved Problems:    * No resolved hospital problems. *      Objective:   Patient Vitals for the past 24 hrs:   Temp Pulse Resp BP SpO2   10/19/22 0805 97.9 °F (36.6 °C) 73 16 (!) 143/82 96 %   10/19/22 0543 97.5 °F (36.4 °C) 65 16 126/79 91 %   10/19/22 0007 98.2 °F (36.8 °C) 61 16 127/75 93 %   10/18/22 1600 97.9 °F (36.6 °C) 63 16 121/68 96 %   10/18/22 1131 97.9 °F (36.6 °C) 56 16 124/71 98 %         Oxygen Therapy  SpO2: 96 %  O2 Device: None (Room air)    Estimated body mass index is 18.5 kg/m² as calculated from the following:    Height as of an earlier encounter on 10/16/22: 5' 8.5\" (1.74 m). Weight as of this encounter: 123 lb 7.3 oz (56 kg). Intake/Output Summary (Last 24 hours) at 10/19/2022 0931  Last data filed at 10/19/2022 0543  Gross per 24 hour   Intake 350 ml   Output 2675 ml   Net -2325 ml           Physical Exam:     Blood pressure (!) 143/82, pulse 73, temperature 97.9 °F (36.6 °C), temperature source Oral, resp. rate 16, height 5' 8.5\" (1.74 m), weight 123 lb 7.3 oz (56 kg), SpO2 96 %. General:    Well nourished. Head:  Normocephalic, atraumatic  Eyes:  Sclerae appear normal.  Pupils equally round. ENT:  Nares appear normal, no drainage.   Moist oral mucosa  Neck:  No restricted ROM. Trachea midline   CV:   RRR. No jugular venous distension. Lungs:   No distress. Symmetric expansion. Abdomen:   nondistended. Extremities: No cyanosis or clubbing. No edema  Skin:     No rashes and normal coloration. Warm and dry. Neuro:  CN II-XII grossly intact. Sensation intact. Gait/balance assessment deferred. Psych:  Normal mood and affect. I have personally reviewed labs and tests showing:  Recent Labs:  Recent Results (from the past 48 hour(s))   RPR Reflex to Titer and TPPA    Collection Time: 10/17/22  9:57 AM   Result Value Ref Range    RPR NONREACTIVE NR     Anti-Hu, Ri, Yo Antibody Profile, Serum    Collection Time: 10/17/22  9:57 AM   Result Value Ref Range    Hu Antibody Negative Negative Titer    Anti-Yo (Purkinje Cell) Negative Negative titer    Ri Antibody Negative Negative Titer   Miscellaneous Sendout    Collection Time: 10/17/22 11:23 AM   Result Value Ref Range    Test Description: [place] ENS1 PANEL     Reference Lab [facility]      Results: PENDING    POCT Glucose    Collection Time: 10/17/22 11:25 AM   Result Value Ref Range    POC Glucose 107 (H) 65 - 100 mg/dL    Performed by: Noemí Angel    POCT Glucose    Collection Time: 10/17/22  4:05 PM   Result Value Ref Range    POC Glucose 91 65 - 100 mg/dL    Performed by: Noemí Angel    POCT Glucose    Collection Time: 10/17/22  8:59 PM   Result Value Ref Range    POC Glucose 97 65 - 100 mg/dL    Performed by: Eva Gaines PPD TEST IN 24 HRS    Collection Time: 10/18/22  5:14 PM   Result Value Ref Range    PPD, (POC) Negative Negative    mm Induration 0 0 - 5 mm       I have personally reviewed imaging studies showing:   Other Studies:  MRI BRAIN W WO CONTRAST   Final Result      Mild ill-defined nonenhancing focus of cortical/subcortical signal abnormality   at the left parieto-occipital junction, indeterminate but may represent evolving   subacute infarct or postictal changes among other etiologies. Recommend   short-term follow-up MRI in approximately 6-12 weeks to assess for   evolution/resolution. No evidence of acute ischemic infarction, intracranial hemorrhage, mass or   midline shift. Mild to moderate chronic white matter microvascular ischemic changes. CT HEAD WO CONTRAST   Final Result   No acute findings         XR CHEST (2 VW)   Final Result   No acute cardiopulmonary process.       MRI CERVICAL SPINE W WO CONTRAST    (Results Pending)   MRI LUMBAR SPINE W WO CONTRAST    (Results Pending)   MRI THORACIC SPINE W WO CONTRAST    (Results Pending)       Current Meds:  Current Facility-Administered Medications   Medication Dose Route Frequency    amitriptyline (ELAVIL) tablet 50 mg  50 mg Oral Nightly    melatonin tablet 3 mg  3 mg Oral Nightly PRN    carvedilol (COREG) tablet 6.25 mg  6.25 mg Oral BID WC    clonazePAM (KLONOPIN) tablet 6 mg  6 mg Oral Nightly PRN    famotidine (PEPCID) tablet 10 mg  10 mg Oral Daily    levothyroxine (SYNTHROID) tablet 125 mcg  125 mcg Oral Daily    OLANZapine (ZYPREXA) tablet 7.5 mg  7.5 mg Oral Nightly    sodium chloride flush 0.9 % injection 5-40 mL  5-40 mL IntraVENous 2 times per day    sodium chloride flush 0.9 % injection 5-40 mL  5-40 mL IntraVENous PRN    0.9 % sodium chloride infusion   IntraVENous PRN    [Held by provider] enoxaparin (LOVENOX) injection 40 mg  40 mg SubCUTAneous Daily    ondansetron (ZOFRAN-ODT) disintegrating tablet 4 mg  4 mg Oral Q8H PRN    Or    ondansetron (ZOFRAN) injection 4 mg  4 mg IntraVENous Q6H PRN    polyethylene glycol (GLYCOLAX) packet 17 g  17 g Oral Daily PRN    acetaminophen (TYLENOL) tablet 650 mg  650 mg Oral Q6H PRN    Or    acetaminophen (TYLENOL) suppository 650 mg  650 mg Rectal Q6H PRN    magnesium sulfate 2000 mg in 50 mL IVPB premix  2,000 mg IntraVENous PRN    hyoscyamine (LEVSIN/SL) sublingual tablet 125 mcg  125 mcg SubLINGual Q6H PRN       Signed:  Carlos Nava Renetta Johnson MD    Part of this note may have been written by using a voice dictation software. The note has been proof read but may still contain some grammatical/other typographical errors.

## 2022-10-19 NOTE — PROGRESS NOTES
Occupational Therapy Note:    Attempted to see patient this PM for occupational therapy treatment  session. Patient is currently off the floor to IR. Will follow and re-attempt as schedule permits/patient available.  Thank you,    Marcio Ragland, OT    Rehab Caseload Tracker

## 2022-10-19 NOTE — PROGRESS NOTES
Pt resting, denies needs at this time, NAD. Pt assisted up to recliner for 1 hr after MRI. Pt premedicated w/Tylenol and Benadryl. Hourly rounds completed. Bed in L/L position, call light and personal items within reach. Will continue to monitor and give bedside report to oncoming nurse.

## 2022-10-19 NOTE — PROGRESS NOTES
Physical Therapy Note:    Attempted to see patient this AM for physical therapy treatment  session. Patient having EEG done. Will follow and re-attempt as schedule permits/patient available.  Thank you,    TASHI PEDROZA, [facility]     Rehab Caseload Tracker

## 2022-10-19 NOTE — PROCEDURES
EEG REPORT       Patient: Jn Nolasco Age: 76 y.o. MRN: [id number]    Date: 10/16/2022  Referring Provider: No ref. provider found    History: This routine 30 minute scalp EEG was recorded with video- monitoring for a 76 y.o.. male  who presented with encephalopathy. This EEG was performed to evaluate for focal and epileptiform abnormalities.      Jn Nolasco   Current Facility-Administered Medications   Medication Dose Route Frequency Provider Last Rate Last Admin    immune globulin (GAMUNEX-C) 10% solution 20 g  0.4 g/kg IntraVENous Daily Ayla See, APRMARGA        acetaminophen (TYLENOL) tablet 1,000 mg  1,000 mg Oral Daily Ayla See, APRN        diphenhydrAMINE (BENADRYL) capsule 25 mg  25 mg Oral Daily Ayla See, APRMARGA        amitriptyline (ELAVIL) tablet 50 mg  50 mg Oral Nightly Gen Ciesco, DO   50 mg at 10/18/22 2137    melatonin tablet 3 mg  3 mg Oral Nightly PRN Gen Ciesco, DO   3 mg at 10/18/22 2137    carvedilol (COREG) tablet 6.25 mg  6.25 mg Oral BID WC Janice Swan MD   6.25 mg at 10/19/22 4395    clonazePAM (KLONOPIN) tablet 6 mg  6 mg Oral Nightly PRN Karmen Harrell Ala, MD   6 mg at 10/18/22 2137    famotidine (PEPCID) tablet 10 mg  10 mg Oral Daily Janice Swan MD   10 mg at 10/19/22 7620    levothyroxine (SYNTHROID) tablet 125 mcg  125 mcg Oral Daily Janice Swan MD   125 mcg at 10/19/22 0502    OLANZapine (ZYPREXA) tablet 7.5 mg  7.5 mg Oral Nightly Janice Swan MD   7.5 mg at 10/18/22 2137    sodium chloride flush 0.9 % injection 5-40 mL  5-40 mL IntraVENous 2 times per day Janice Swan MD   10 mL at 10/19/22 0916    sodium chloride flush 0.9 % injection 5-40 mL  5-40 mL IntraVENous PRN Janice Swan MD        0.9 % sodium chloride infusion   IntraVENous PRN Karmen Harrell Ala, MD        [Held by provider] enoxaparin (LOVENOX) injection 40 mg  40 mg SubCUTAneous Daily Janice Swan MD   40 mg at 10/19/22 0833    ondansetron (ZOFRAN-ODT) disintegrating tablet 4 mg  4 mg Oral Q8H PRN Divya Dinero Ala, MD        Or    ondansetron (ZOFRAN) injection 4 mg  4 mg IntraVENous Q6H PRN Divya Dinero Ala, MD        polyethylene glycol (GLYCOLAX) packet 17 g  17 g Oral Daily PRN Divya Dinero Ala, MD        acetaminophen (TYLENOL) tablet 650 mg  650 mg Oral Q6H PRN Divya Dinero Ala, MD        Or    acetaminophen (TYLENOL) suppository 650 mg  650 mg Rectal Q6H PRN Divya Dinero Ala, MD        magnesium sulfate 2000 mg in 50 mL IVPB premix  2,000 mg IntraVENous PRN Divya Dinero Ala, MD        hyoscyamine (LEVSIN/SL) sublingual tablet 125 mcg  125 mcg SubLINGual Q6H PRN Kimberley Pinto MD            Technical Description: This is a 21 channel digital EEG recording with time-locked video. Electrodes were placed in accordance with the 10-20 International System of Electrode Placement. Single lead EKG monitoring as well as temporal electrodes were included. The patient was not sleep deprived. This recording was obtained during wakefulness. EEG Description: The dominant background activity during maximal recorded wakefulness consisted of bioccipitally dominant 9-10 Hz, 25-35 uV symmetric, regular activity that was reactive to eye opening. During drowsiness, the background rhythm waxed and waned and there were periods of slowing  There is substantial beta activity related to medication artifact    Hyperventilation - was not preformed. No abnormalities were activated by photic stimulation     The EKG channel demonstrated a normal sinus rhythm. Interpretation  This EEG was normal in wakefulness it contains a good deal of muscle tension and a fast medication artifact but is otherwise unremarkable no periodic activity    Clinical correlation  This EEG was normal. No focal or epileptiform abnormalities were seen.     Nora Gutierrez MD  Diplomate, American Board of Psychiatry and Neurology  Fellow, [address] [facility] [employer]  Member, American Neurological Association

## 2022-10-19 NOTE — PROGRESS NOTES
Hourly rounds in progress. MRI done. Slept well. Denies needs or pain at this time. Bed in low locked position. Call light within reach. Bed alarm on. Will continue to monitor and give report to oncoming day shift nurse.

## 2022-10-20 ENCOUNTER — APPOINTMENT (OUTPATIENT)
Dept: INTERVENTIONAL RADIOLOGY/VASCULAR | Age: 77
DRG: 556 | End: 2022-10-20
Payer: MEDICARE

## 2022-10-20 ENCOUNTER — APPOINTMENT (OUTPATIENT)
Dept: PHYSICAL THERAPY | Age: 77
End: 2022-10-20
Payer: MEDICARE

## 2022-10-20 LAB
APPEARANCE FLD: CLEAR
COLOR FLD: COLORLESS
GLUCOSE CSF-MCNC: 65 MG/DL (ref 40–70)
NUC CELL # FLD: 0 /CU MM
PROT CSF-MCNC: 45 MG/DL (ref 15–45)
RBC # FLD: 1 /CU MM
SPECIMEN SOURCE FLD: NORMAL
TUBE # CSF: 1
TUBE # CSF: 1

## 2022-10-20 PROCEDURE — 86317 IMMUNOASSAY INFECTIOUS AGENT: CPT

## 2022-10-20 PROCEDURE — 88112 CYTOPATH CELL ENHANCE TECH: CPT

## 2022-10-20 PROCEDURE — 89050 BODY FLUID CELL COUNT: CPT

## 2022-10-20 PROCEDURE — 009U3ZX DRAINAGE OF SPINAL CANAL, PERCUTANEOUS APPROACH, DIAGNOSTIC: ICD-10-PCS | Performed by: RADIOLOGY

## 2022-10-20 PROCEDURE — 0035U NEURO CSF PRION PRTN QUAL: CPT

## 2022-10-20 PROCEDURE — 6370000000 HC RX 637 (ALT 250 FOR IP): Performed by: INTERNAL MEDICINE

## 2022-10-20 PROCEDURE — 6360000002 HC RX W HCPCS: Performed by: NURSE PRACTITIONER

## 2022-10-20 PROCEDURE — 2500000003 HC RX 250 WO HCPCS: Performed by: PHYSICIAN ASSISTANT

## 2022-10-20 PROCEDURE — 82945 GLUCOSE OTHER FLUID: CPT

## 2022-10-20 PROCEDURE — 99232 SBSQ HOSP IP/OBS MODERATE 35: CPT | Performed by: PSYCHIATRY & NEUROLOGY

## 2022-10-20 PROCEDURE — 86255 FLUORESCENT ANTIBODY SCREEN: CPT

## 2022-10-20 PROCEDURE — 97530 THERAPEUTIC ACTIVITIES: CPT

## 2022-10-20 PROCEDURE — 62328 DX LMBR SPI PNXR W/FLUOR/CT: CPT

## 2022-10-20 PROCEDURE — 84182 PROTEIN WESTERN BLOT TEST: CPT

## 2022-10-20 PROCEDURE — 2580000003 HC RX 258: Performed by: HOSPITALIST

## 2022-10-20 PROCEDURE — 84157 ASSAY OF PROTEIN OTHER: CPT

## 2022-10-20 PROCEDURE — 86341 ISLET CELL ANTIBODY: CPT

## 2022-10-20 PROCEDURE — 1100000000 HC RM PRIVATE

## 2022-10-20 PROCEDURE — APPSS45 APP SPLIT SHARED TIME 31-45 MINUTES: Performed by: NURSE PRACTITIONER

## 2022-10-20 PROCEDURE — 6370000000 HC RX 637 (ALT 250 FOR IP): Performed by: HOSPITALIST

## 2022-10-20 PROCEDURE — 87070 CULTURE OTHR SPECIMN AEROBIC: CPT

## 2022-10-20 RX ORDER — LIDOCAINE HYDROCHLORIDE 10 MG/ML
INJECTION, SOLUTION EPIDURAL; INFILTRATION; INTRACAUDAL; PERINEURAL
Status: COMPLETED | OUTPATIENT
Start: 2022-10-20 | End: 2022-10-20

## 2022-10-20 RX ADMIN — CARVEDILOL 6.25 MG: 6.25 TABLET, FILM COATED ORAL at 17:37

## 2022-10-20 RX ADMIN — Medication 3 MG: at 21:16

## 2022-10-20 RX ADMIN — OLANZAPINE 7.5 MG: 5 TABLET, FILM COATED ORAL at 21:19

## 2022-10-20 RX ADMIN — LIDOCAINE HYDROCHLORIDE 6 ML: 10 INJECTION, SOLUTION EPIDURAL; INFILTRATION; INTRACAUDAL; PERINEURAL at 08:21

## 2022-10-20 RX ADMIN — SODIUM CHLORIDE, PRESERVATIVE FREE 10 ML: 5 INJECTION INTRAVENOUS at 20:13

## 2022-10-20 RX ADMIN — IMMUNE GLOBULIN (HUMAN) 20 G: 10 INJECTION INTRAVENOUS; SUBCUTANEOUS at 16:06

## 2022-10-20 RX ADMIN — AMITRIPTYLINE HYDROCHLORIDE 50 MG: 50 TABLET, FILM COATED ORAL at 21:16

## 2022-10-20 RX ADMIN — LEVOTHYROXINE SODIUM 125 MCG: 0.12 TABLET ORAL at 04:57

## 2022-10-20 RX ADMIN — CLONAZEPAM 6 MG: 1 TABLET ORAL at 21:16

## 2022-10-20 RX ADMIN — SODIUM CHLORIDE, PRESERVATIVE FREE 10 ML: 5 INJECTION INTRAVENOUS at 09:41

## 2022-10-20 RX ADMIN — FAMOTIDINE 10 MG: 20 TABLET, FILM COATED ORAL at 09:37

## 2022-10-20 RX ADMIN — ACETAMINOPHEN 1000 MG: 500 TABLET, FILM COATED ORAL at 15:39

## 2022-10-20 RX ADMIN — ACETAMINOPHEN 650 MG: 325 TABLET, FILM COATED ORAL at 09:36

## 2022-10-20 RX ADMIN — DIPHENHYDRAMINE HYDROCHLORIDE 25 MG: 25 CAPSULE ORAL at 15:39

## 2022-10-20 RX ADMIN — CARVEDILOL 6.25 MG: 6.25 TABLET, FILM COATED ORAL at 09:38

## 2022-10-20 ASSESSMENT — PAIN - FUNCTIONAL ASSESSMENT
PAIN_FUNCTIONAL_ASSESSMENT: ACTIVITIES ARE NOT PREVENTED
PAIN_FUNCTIONAL_ASSESSMENT: NONE - DENIES PAIN

## 2022-10-20 ASSESSMENT — PAIN DESCRIPTION - DESCRIPTORS: DESCRIPTORS: DISCOMFORT

## 2022-10-20 ASSESSMENT — PAIN SCALES - GENERAL: PAINLEVEL_OUTOF10: 2

## 2022-10-20 ASSESSMENT — PAIN DESCRIPTION - LOCATION: LOCATION: BACK

## 2022-10-20 NOTE — PROGRESS NOTES
Neurology Daily Progress Note     Assessment:     68-year-old man with inability to ambulate. On neurological examination, he has at least moderate dysphonia. He states that this is from amitriptyline; however, his dysphonia seems out of proportion to dry mouth from anticholinergic agents. In addition, he is areflexic in his lower extremities. His brain MRI shows an area of T2 signal abnormality in the left parieto-occipital junction. The differential is broad, including inflammatory, autoimmune, and paraneoplastic causes. Will plan for LP   Plan:     The patient was on multiple anticholinergic medications. These have been reduced or discontinued. This may be contributing, at least in part, to patients balance issues. PT/OT    LP under fluoro. Patient had Lovenox today. This has been held. LP cannot be done until Friday     Intravenous immunoglobulin 2 grams/kg divided over 5 days (0.4 g/kg/day x 5 days). Pretreat with antihistamine and Tylenol 30 minutes prior to infusion. Subjective: Interval history:    Patient unchanged clinically. Tolerating IVIG. Shyam Zepeda is a 76 y.o. male who is being seen for imbalance.     Past Medical History:   Diagnosis Date    Acute deep vein thrombosis (DVT) of non-extremity vein 5/20/2019    Family history of malignant neoplasm of gastrointestinal tract     mother colon/ovarian cancer 67    Fecal incontinence     LAR syndrome    John catheter in place 2022    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    surgery and radiation    Psychiatric disorder     anxiety     Past Surgical History:   Procedure Laterality Date    COLONOSCOPY  last 2/3/15    Roberto--no polyps--3 year recall    COLONOSCOPY N/A 3/5/2021    COLONOSCOPY/BMI 19 performed by Linda Echeverria MD at [facility] ENDOSCOPY    COLONOSCOPY N/A 2/12/2018    COLONOSCOPY / BMI=21 performed by Daniel Brown MD at [place] [id number]  2/12/2018         COLONOSCOPY THRU STOMA,LESN RMVL W/SNARE  3/5/2021         COLOSTOMY  5/11/16    CT RETROPERITONEAL PERC DRAIN  5/13/2021    CT RETROPERITONEAL PERC DRAIN 5/13/2021 [facility] CT SCAN    CT RETROPERITONEAL PERC DRAIN  5/20/2021    CT RETROPERITONEAL PERC DRAIN 5/20/2021 [facility] CT SCAN    GI  4/2016    Sacral nerve stimlator lead trial (unsucessful) and removal    HERNIA REPAIR      and Colostomy in May    HERNIA REPAIR  3/2015, 5/2016    IR ABSCESS EVAL THRU EXISTING CATH  11/24/2021    IR ABSCESS EVAL THRU EXISTING CATH  6/4/2021    IR ABSCESS EVAL THRU EXISTING CATH  5/20/2021    IR NEPHROSTOGRAM EXISTING ACCESS  12/10/2021    IR NEPHROSTOGRAM EXISTING ACCESS  10/4/2021    IR NEPHROSTOMY EXCHANGE CATHETER  12/7/2021    IR NEPHROSTOMY EXCHANGE CATHETER  11/3/2021    IR NEPHROSTOMY EXCHANGE CATHETER  10/28/2021    IR NEPHROSTOMY PERCUTANEOUS LEFT  9/16/2021    IR NEPHROSTOMY PERCUTANEOUS LEFT  9/16/2021    IR NEPHROSTOMY PERCUTANEOUS LEFT 9/16/2021 [facility]    IR REPLCE T CVC WO PORT SAME ACC  5/30/2019    IR TUBE CHANGE  8/5/2021    IR TUBE CHANGE  7/8/2021    IR TUBE CHANGE  6/14/2021    IR TUBE CHANGE  5/27/2021    IR TUNNELED CATHETER PLACEMENT GREATER THAN 5 YEARS  5/20/2019    IR TUNNELED CATHETER PLACEMENT GREATER THAN 5 YEARS  5/20/2019    IR TUNNELED CATHETER PLACEMENT GREATER THAN 5 YEARS 5/20/2019 [facility]    OTHER SURGICAL HISTORY Bilateral     cataracts  2017    OTHER SURGICAL HISTORY  10/7/2013 Roberto---endorectal us    POLYPECTOMY      TOTAL COLECTOMY  2013    Roberto--lap LAR with coloproctostomy, mobilization splenic flexure, diverting loop ileostomy    TOTAL COLECTOMY Right 2014    for leak    VASCULAR SURGERY Left     single lumen port/subsequently removed      Family History   Problem Relation Age of Onset    Cancer Mother         colon and ovarian    Cancer Brother         prostate    Alcohol Abuse Brother     Cancer Maternal Grandmother         bone    Alcohol Abuse Father     Stroke Paternal Grandfather     Alcohol Abuse Sister      Social History     Tobacco Use    Smoking status: Former     Packs/day: 1.00     Types: Cigarettes     Quit date: 1963     Years since quittin.9    Smokeless tobacco: Never   Substance Use Topics    Alcohol use: Not Currently     Alcohol/week: 11.7 standard drinks      Current Facility-Administered Medications   Medication Dose Route Frequency Provider Last Rate Last Admin    immune globulin (GAMUNEX-C) 10% solution 20 g  0.4 g/kg IntraVENous Daily GERMAN Rizvi 33 mL/hr at 10/19/22 1746 20 g at 10/19/22 174    acetaminophen (TYLENOL) tablet 1,000 mg  1,000 mg Oral Daily Juan Jose Magana MD   1,000 mg at 10/19/22 162    diphenhydrAMINE (BENADRYL) capsule 25 mg  25 mg Oral Daily Juan Jose Magana MD   25 mg at 10/19/22 162    amitriptyline (ELAVIL) tablet 50 mg  50 mg Oral Nightly Gen Ciesco, DO   50 mg at 10/19/22 2055    melatonin tablet 3 mg  3 mg Oral Nightly PRN Gen Ciesco, DO   3 mg at 10/19/22 2055    carvedilol (COREG) tablet 6.25 mg  6.25 mg Oral BID WC Janice Swan MD   6.25 mg at 10/20/22 09    clonazePAM (KLONOPIN) tablet 6 mg  6 mg Oral Nightly PRN Janice Swan MD   6 mg at 10/19/22 2054    famotidine (PEPCID) tablet 10 mg  10 mg Oral Daily Janice Swan MD   10 mg at 10/20/22 09    levothyroxine (SYNTHROID) tablet 125 mcg  125 mcg Oral Daily Janice Swan MD   125 mcg at 10/20/22 0457    OLANZapine (Cheril Cypress) tablet 7.5 mg  7.5 mg Oral Nightly Janice Swan MD   7.5 mg at 10/19/22 2054    sodium chloride flush 0.9 % injection 5-40 mL  5-40 mL IntraVENous 2 times per day Janice Swan MD   10 mL at 10/20/22 0941    sodium chloride flush 0.9 % injection 5-40 mL  5-40 mL IntraVENous PRN Janice Swan MD        0.9 % sodium chloride infusion   IntraVENous PRN Jaime Cuba Ala, MD        [Held by provider] enoxaparin (LOVENOX) injection 40 mg  40 mg SubCUTAneous Daily Janice Swan MD   40 mg at 10/19/22 0833    ondansetron (ZOFRAN-ODT) disintegrating tablet 4 mg  4 mg Oral Q8H PRN Jaime Cuba Ala, MD        Or    ondansetron (ZOFRAN) injection 4 mg  4 mg IntraVENous Q6H PRN Janice Swan MD        polyethylene glycol (GLYCOLAX) packet 17 g  17 g Oral Daily PRN Jaime Cuba Ala, MD        acetaminophen (TYLENOL) tablet 650 mg  650 mg Oral Q6H PRN Jaime Cuba Ala, MD   650 mg at 10/20/22 7868    Or    acetaminophen (TYLENOL) suppository 650 mg  650 mg Rectal Q6H PRN Janice Swan MD        magnesium sulfate 2000 mg in 50 mL IVPB premix  2,000 mg IntraVENous PRN Jaime Cuba Ala, MD        hyoscyamine (LEVSIN/SL) sublingual tablet 125 mcg  125 mcg SubLINGual Q6H PRN Janice Swan MD            No Known Allergies    Review of systems negative with exception of pertinent positives and negatives noted above.        Objective:     Vitals:    10/20/22 0845 10/20/22 0900 10/20/22 0933 10/20/22 1125   BP: 121/78 138/71 116/79 110/75   Pulse:   79 67   Resp:   16 16   Temp:   97.9 °F (36.6 °C) 98.2 °F (36.8 °C)   TempSrc:   Oral Oral   SpO2: 97% 96% 96% 96%   Weight:       Height:              Current Facility-Administered Medications:     immune globulin (GAMUNEX-C) 10% solution 20 g, 0.4 g/kg, IntraVENous, Daily, GERMAN Baker, Last Rate: 33 mL/hr at 10/19/22 1746, 20 g at 10/19/22 1746    acetaminophen (TYLENOL) tablet 1,000 mg, 1,000 mg, Oral, Daily, Akin Golden MD, 1,000 mg at 10/19/22 1628    diphenhydrAMINE (BENADRYL) capsule 25 mg, 25 mg, Oral, Daily, Kenan Rodas MD, 25 mg at 10/19/22 1629    amitriptyline (ELAVIL) tablet 50 mg, 50 mg, Oral, Nightly, Gen Mcmahon DO, 50 mg at 10/19/22 2055    melatonin tablet 3 mg, 3 mg, Oral, Nightly PRN, Gabe Bowden DO, 3 mg at 10/19/22 2055    carvedilol (COREG) tablet 6.25 mg, 6.25 mg, Oral, BID WC, Janice Swan MD, 6.25 mg at 10/20/22 4168    clonazePAM (KLONOPIN) tablet 6 mg, 6 mg, Oral, Nightly PRN, Maurice Wilkins Ala, MD, 6 mg at 10/19/22 2054    famotidine (PEPCID) tablet 10 mg, 10 mg, Oral, Daily, Maurice Wilkins Ala, MD, 10 mg at 10/20/22 7631    levothyroxine (SYNTHROID) tablet 125 mcg, 125 mcg, Oral, Daily, Maurice Wilkins Ala, MD, 125 mcg at 10/20/22 0457    OLANZapine (ZYPREXA) tablet 7.5 mg, 7.5 mg, Oral, Nightly, Janice Swan MD, 7.5 mg at 10/19/22 2054    sodium chloride flush 0.9 % injection 5-40 mL, 5-40 mL, IntraVENous, 2 times per day, Janice Swan MD, 10 mL at 10/20/22 0941    sodium chloride flush 0.9 % injection 5-40 mL, 5-40 mL, IntraVENous, PRN, Maurice Wilkins Ala, MD    0.9 % sodium chloride infusion, , IntraVENous, PRN, Maurice Wilkins Ala, MD    [Held by provider] enoxaparin (LOVENOX) injection 40 mg, 40 mg, SubCUTAneous, Daily, Maurice Wilkins Ala, MD, 40 mg at 10/19/22 0833    ondansetron (ZOFRAN-ODT) disintegrating tablet 4 mg, 4 mg, Oral, Q8H PRN **OR** ondansetron (ZOFRAN) injection 4 mg, 4 mg, IntraVENous, Q6H PRN, Maurice Wilkins Ala, MD    polyethylene glycol (GLYCOLAX) packet 17 g, 17 g, Oral, Daily PRN, Maurice Wilkins Ala, MD    acetaminophen (TYLENOL) tablet 650 mg, 650 mg, Oral, Q6H PRN, 650 mg at 10/20/22 0936 **OR** acetaminophen (TYLENOL) suppository 650 mg, 650 mg, Rectal, Q6H PRN, Maurice Wilkins Ala, MD    magnesium sulfate 2000 mg in 50 mL IVPB premix, 2,000 mg, IntraVENous, PRN, Maurice Wilkins Ala, MD    hyoscyamine (LEVSIN/SL) sublingual tablet 125 mcg, 125 mcg, SubLINGual, Q6H PRN, Maurice Wilkins Ala, MD    Recent Results (from the past 12 hour(s))   Glucose, CSF    Collection Time: 10/20/22  8:21 AM   Result Value Ref Range    Tube Number 1      Glucose, CSF 65 40 - 70 MG/DL   Protein, CSF    Collection Time: 10/20/22  8:21 AM   Result Value Ref Range    Tube Number 1      Protein, CSF 45 15 - 45 MG/DL   Culture, CSF    Collection Time: 10/20/22  8:21 AM    Specimen: CSF   Result Value Ref Range    Special Requests NO SPECIAL REQUESTS      Gram stain NO WBCS SEEN      Gram stain NO DEFINITE ORGANISM SEEN      Culture PENDING    Body fluid cell count    Collection Time: 10/20/22  8:21 AM   Result Value Ref Range    Specimen CEREBROSPINAL FLUID      Color, Fluid COLORLESS      Appearance, Fluid CLEAR      RBC, Fluid 1 /cu mm    WBC, Fluid 0 /cu mm   Miscellaneous Sendout    Collection Time: 10/20/22  8:21 AM   Result Value Ref Range    Test Description: 14      Reference Lab [facility]      Results: PENDING        CT Results (most recent):  CT reviewed. Results do not populate into note    Most recent MRI   No results found for this or any previous visit. Most recent MRA   No results found for this or any previous visit. Most recent CTA  No results found for this or any previous visit. Most recent Echo  No results found for this or any previous visit. Physical Exam:  General -: Borderline cachectic. Pleasant and conversant. Dysphonic  HEENT - Normocephalic, atraumatic. Conjunctiva, tympanic membranes, and oropharynx are clear. Neck - Supple without masses, no bruits   Extremities - Peripheral pulses intact. No edema and no rashes. Neurological examination - Comprehension, attention , memory and reasoning are intact. Language is normal.  Speech is at least moderately dysphonic. On cranial nerve examination pupils are equal round and reactive to light. Fundoscopic examination is normal. Visual acuity is adequate. Visual fields are full to finger confrontation. Extraocular motility is normal. Face is symmetric and sensation is intact to light touch. Hearing is intact to finger rustle bilaterally.  Motor examination - There is normal muscle tone and bulk. Power is full throughout with exception to 2/5 strength of the iliopsoas and 4 -/5 strength of the quadriceps which is chronic. Muscle stretch reflexes are absent at the patella and Achilles, bilaterally. Sensation is intact to light touch, pinprick, vibration and proprioception in all extremities. He may have some mild proprioceptive deficits in the right lower limb. Cerebellar examination is normal.  Cannot ambulate or stand without significant help    Signed By: Ayla Samayoa, APRN     October 20, 2022    Neurology attending    Patient seen and examined he tolerated first round of IVIG well with no adverse effects  I specifically sought out Dr. Leonette Holter for an additional review of his MRI scan and have specifically reviewed that study with Dr. Deangelo Villasenor in the radiology consulting room. We are in agreement that there is a process going on in the posterior temporal region on the left the exact nature of which is unclear. There are no findings in the cerebellum.   We are agreed that autoimmune etiologies need to be sought Dr. Deangelo Villasenor believes it is highly unlikely this is a metastatic lesion however this remains in the differential  Based upon the absence of cortical ribboning this does not represent a prion mediated disorder

## 2022-10-20 NOTE — BRIEF OP NOTE
Department of Interventional Radiology  [phone]        Interventional Radiology Brief Procedure Note    Patient: Kari Burks MRN: [id number]  SSN: [id number]    YOB: 1945  Age: 76 y.o.   Sex: male      Date of Procedure: 10/20/2022    Pre-Procedure Diagnosis: Gait instability, dysphonia    Post-Procedure Diagnosis: SAME    Procedure(s): Lumbar Puncture    Brief Description of Procedure: IR guided lumbar puncture was performed at the L3-L4 level    Performed By: GENIA Mehta     Assistants: Rakesh Smith PA-C    Anesthesia:Lidocaine    Estimated Blood Loss: None    Specimens:   14 cc of CSF was sent off to the lab    Implants:  None    Findings: Clear CSF with no complications during procedure    Complications: None    Recommendations: Routine wound care and bedrest lying flat for at least 1 hour    Follow Up: As needed    Signed By: Karie Mehta     October 20, 2022

## 2022-10-20 NOTE — PROGRESS NOTES
Hospitalist Progress Note   Admit Date:  10/16/2022  8:25 AM   Name:  Erna Gottron Comin   Age:  76 y.o. Sex:  male  :  1945   MRN:  [id number]   Room:  [facility]/    Presenting Complaint: Fatigue     Reason(s) for Admission: Dehydration [E86.0]  Exertional dyspnea [R06.09]  Leg weakness, bilateral [R29.898]  Unable to ambulate [R26.2]  Decreased activities of daily living (ADL) [Z78.9]     Hospital Course:     Christiano Bearden is a 76 y.o. 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 Patient states that he feels so weak that his legs gave out and is having balance issues with walking. Patient denies any fever chills chest pain cough shortness of breath. No palpitations. No nausea no vomiting no diarrhea. No abdominal pain. He denies any falls. Patient was afebrile, hemodynamically stable. ER physician reported that the patient's oxygen saturation dropped to 88% on room air and was placed on nasal cannula. CT head on admission negative. Hospital Course: admitted for sudden onset lower leg weakness in setting of known rectal cancer in the past. MRI brain/spine negative for etiology. Subjective & 24hr Events (10/20/22): Pt has not noticed an improvement in weakness yet. No fevers, CP, SOB, or other neurologic complaints. Assessment & Plan:       Leg weakness, bilateral  -MRI of spine did not show abnormalities  -started on IVIG. Cannot do this on 9th floor.   5d total  -lumbar puncture today; follow up results  -cont PT/OT  - decreased Elavil to 50 mg, plan to wean off eventually  - discontinued oxybutynin       Rectal cancer ([facility] [address]  - oupatient follow-up    # Hypothyroidism  - Synthroid 125 mcg      Diet:  ADULT DIET; Regular; Low Fat/Low Chol/High Fiber/2 gm Na  DVT PPx: Lovenox   Code status: Full Code    Hospital Problems:  Principal Problem:    Leg weakness, bilateral  Active Problems:    Unable to ambulate    Rectal cancer (HCC)    Decreased activities of daily living (ADL)    Hypothyroidism  Resolved Problems:    * No resolved hospital problems. *      Objective:   Patient Vitals for the past 24 hrs:   Temp Pulse Resp BP SpO2   10/20/22 0933 97.9 °F (36.6 °C) 79 -- 116/79 96 %   10/20/22 0900 -- -- -- 138/71 96 %   10/20/22 0845 -- -- -- 121/78 97 %   10/20/22 0837 -- 69 16 118/67 96 %   10/20/22 0742 97.2 °F (36.2 °C) 66 16 128/75 95 %   10/20/22 0715 97.7 °F (36.5 °C) 60 16 122/75 94 %   10/20/22 0300 98.9 °F (37.2 °C) 74 16 136/70 --   10/19/22 2356 98.2 °F (36.8 °C) 65 18 113/67 95 %   10/19/22 1936 99.1 °F (37.3 °C) 62 20 107/68 95 %   10/19/22 1635 98.6 °F (37 °C) 82 16 (!) 142/85 95 %   10/19/22 1219 98.1 °F (36.7 °C) 60 -- 110/73 98 %         Oxygen Therapy  SpO2: 96 %  Pulse via Oximetry: 74 beats per minute  Pulse Oximeter Device Mode: Continuous  O2 Device: None (Room air)    Estimated body mass index is 18.43 kg/m² as calculated from the following:    Height as of this encounter: 5' 8.5\" (1.74 m). Weight as of this encounter: 123 lb (55.8 kg). Intake/Output Summary (Last 24 hours) at 10/20/2022 0978  Last data filed at 10/20/2022 0456  Gross per 24 hour   Intake 300 ml   Output 850 ml   Net -550 ml           Physical Exam:     Blood pressure 116/79, pulse 79, temperature 97.9 °F (36.6 °C), temperature source Oral, resp. rate 16, height 5' 8.5\" (1.74 m), weight 123 lb (55.8 kg), SpO2 96 %. General:    Well nourished. Head:  Normocephalic, atraumatic  Eyes:  Sclerae appear normal.  Pupils equally round. ENT:  Nares appear normal, no drainage. Moist oral mucosa  Neck:  No restricted ROM. Trachea midline   CV:   RRR.   No jugular venous distension. Lungs:   No distress. Symmetric expansion. Abdomen:   nondistended. Extremities: No cyanosis or clubbing. No edema  Skin:     No rashes and normal coloration. Warm and dry. Neuro:  CN II-XII grossly intact. Sensation intact. Gait/balance assessment deferred. Psych:  Normal mood and affect. I have personally reviewed labs and tests showing:  Recent Labs:  Recent Results (from the past 48 hour(s))   PLEASE READ & DOCUMENT PPD TEST IN 24 HRS    Collection Time: 10/18/22  5:14 PM   Result Value Ref Range    PPD, (POC) Negative Negative    mm Induration 0 0 - 5 mm   PLEASE READ & DOCUMENT PPD TEST IN 48 HRS    Collection Time: 10/19/22  5:15 PM   Result Value Ref Range    PPD, (POC) Negative Negative    mm Induration 0 0 - 5 mm       I have personally reviewed imaging studies showing: Other Studies:  IR LUMBAR PUNCTURE FOR DIAGNOSIS   Preliminary Result   Uncomplicated lumbar puncture. Plan: The patient will recover at bedrest.            MRI THORACIC SPINE W WO CONTRAST   Final Result   No evidence of metastasis to the thoracic spine. No visualized etiology for the   patient's symptoms. MRI CERVICAL SPINE W WO CONTRAST   Final Result   No visualized etiology for the patient's symptoms. No evidence of metastasis to   the cervical spine. MRI LUMBAR SPINE W WO CONTRAST   Final Result      1. No evidence of metastasis to the lumbar spine. 2.  No acute abnormality. 3.  Multilevel spondylosis and facet arthrosis. MRI BRAIN W WO CONTRAST   Final Result      Mild ill-defined nonenhancing focus of cortical/subcortical signal abnormality   at the left parieto-occipital junction, indeterminate but may represent evolving   subacute infarct or postictal changes among other etiologies. Recommend   short-term follow-up MRI in approximately 6-12 weeks to assess for   evolution/resolution.       No evidence of acute ischemic infarction, intracranial hemorrhage, mass or midline shift. Mild to moderate chronic white matter microvascular ischemic changes. CT HEAD WO CONTRAST   Final Result   No acute findings         XR CHEST (2 VW)   Final Result   No acute cardiopulmonary process. Current Meds:  Current Facility-Administered Medications   Medication Dose Route Frequency    immune globulin (GAMUNEX-C) 10% solution 20 g  0.4 g/kg IntraVENous Daily    acetaminophen (TYLENOL) tablet 1,000 mg  1,000 mg Oral Daily    diphenhydrAMINE (BENADRYL) capsule 25 mg  25 mg Oral Daily    amitriptyline (ELAVIL) tablet 50 mg  50 mg Oral Nightly    melatonin tablet 3 mg  3 mg Oral Nightly PRN    carvedilol (COREG) tablet 6.25 mg  6.25 mg Oral BID WC    clonazePAM (KLONOPIN) tablet 6 mg  6 mg Oral Nightly PRN    famotidine (PEPCID) tablet 10 mg  10 mg Oral Daily    levothyroxine (SYNTHROID) tablet 125 mcg  125 mcg Oral Daily    OLANZapine (ZYPREXA) tablet 7.5 mg  7.5 mg Oral Nightly    sodium chloride flush 0.9 % injection 5-40 mL  5-40 mL IntraVENous 2 times per day    sodium chloride flush 0.9 % injection 5-40 mL  5-40 mL IntraVENous PRN    0.9 % sodium chloride infusion   IntraVENous PRN    [Held by provider] enoxaparin (LOVENOX) injection 40 mg  40 mg SubCUTAneous Daily    ondansetron (ZOFRAN-ODT) disintegrating tablet 4 mg  4 mg Oral Q8H PRN    Or    ondansetron (ZOFRAN) injection 4 mg  4 mg IntraVENous Q6H PRN    polyethylene glycol (GLYCOLAX) packet 17 g  17 g Oral Daily PRN    acetaminophen (TYLENOL) tablet 650 mg  650 mg Oral Q6H PRN    Or    acetaminophen (TYLENOL) suppository 650 mg  650 mg Rectal Q6H PRN    magnesium sulfate 2000 mg in 50 mL IVPB premix  2,000 mg IntraVENous PRN    hyoscyamine (LEVSIN/SL) sublingual tablet 125 mcg  125 mcg SubLINGual Q6H PRN       Signed:  Eric Han MD    Part of this note may have been written by using a voice dictation software.   The note has been proof read but may still contain some grammatical/other typographical errors.

## 2022-10-20 NOTE — PROGRESS NOTES
Physical Therapy Note:    Attempted to see patient this AM for physical therapy treatment  session. Patient is just back to floor and is on 1 hour bedrest. Will follow and re-attempt as schedule permits/patient available.  Thank you,    TASHI PEDROZA, [facility]     Rehab Caseload Tracker

## 2022-10-20 NOTE — PROGRESS NOTES
ACUTE OCCUPATIONAL THERAPY GOALS:   (Developed with and agreed upon by patient and/or caregiver.)  1. Patient will complete lower body bathing and dressing with modified independence and adaptive equipment as needed. 2. Patient will complete toileting with modified independence. 3. Patient will tolerate 30 minutes of OT treatment with 1-2 rest breaks to increase activity tolerance for ADLs. 4. Patient will complete functional transfers with modified independence and adaptive equipment as needed. 5. Patient will complete functional mobility of household distances with modified independence and appropriate safety awareness. Timeframe: 7 visits      OCCUPATIONAL THERAPY: Daily Note PM   OT Visit Days: 3   Time  OT Charge Capture  Rehab Caseload Tracker  OT Orders    Uzma Brooks is a 76 y.o. male   PRIMARY DIAGNOSIS: Leg weakness, bilateral  Dehydration [E86.0]  Exertional dyspnea [R06.09]  Leg weakness, bilateral [R29.898]  Unable to ambulate [R26.2]  Decreased activities of daily living (ADL) [Z78.9]       Inpatient: Payor: Sherrie Garcia / Plan: MEDICARE PART A AND B / Product Type: *No Product type* /     ASSESSMENT:     REHAB RECOMMENDATIONS: CURRENT LEVEL OF FUNCTION:  (Most Recently Demonstrated)   Recommendation to date pending progress:  Setting:  Inpatient Rehab Facility    Equipment:    To Be Determined Bathing:  Not Tested  Dressing:  Not Tested  Feeding/Grooming:  Minimal Assist- CGA brushing teeth at sink   Toileting:  Not Tested  Functional Mobility:  Minimal Assist- CGA x 2 with RW      ASSESSMENT:  Mr. Cm Graham presents with generalized weakness primarily in his LE's. Pt moved well today completing functional mobility in the hallway with a rolling walker and CGA. Pt stated that he was surprised himself that he was moving as well as he did this treatment. Good effort. Continue POC. SUBJECTIVE:     Mr. Cm Graham states, \"I did better this time than I did the other day. \" Social/Functional Lives With: Alone  Type of Home: House  Home Layout: Two level  Bathroom Shower/Tub: Tub/Shower unit  ADL Assistance: Independent  Homemaking Assistance: Independent  Active : Yes    OBJECTIVE:     Nikko Eh / Cherelle Posadas / Tayla Goyal: Colostomy and John Catheter    RESTRICTIONS/PRECAUTIONS:  Restrictions/Precautions  Restrictions/Precautions: Fall Risk        PAIN: VITALS / O2:   Pre Treatment:            Post Treatment: Same  Vitals          Oxygen        MOBILITY: I Mod I S SBA CGA Min Mod Max Total  NT x2 Comments:   Bed Mobility    Rolling [] [] [] [] [] [] [] [] [] [] []    Supine to Sit [] [] [] [x] [] [] [] [] [] [] []    Scooting [] [] [] [x] [] [] [] [] [] [] []    Sit to Supine [] [] [] [x] [] [] [] [] [] [] []    Transfers    Sit to Stand [] [] [] [] [x] [x] [] [] [] [] [x]    Bed to Chair [] [] [] [] [x] [x] [] [] [] [] [x]    Stand to Sit [] [] [] [] [x] [x] [] [] [] [] [x]    Tub/Shower [] [] [] [] [] [] [] [] [] [x] []     Toilet [] [] [] [] [] [] [] [] [] [x] []      [] [] [] [] [] [] [] [] [] [] []    I=Independent, Mod I=Modified Independent, S=Supervision/Setup, SBA=Standby Assistance, CGA=Contact Guard Assistance, Min=Minimal Assistance, Mod=Moderate Assistance, Max=Maximal Assistance, Total=Total Assistance, NT=Not Tested    ACTIVITIES OF DAILY LIVING: I Mod I S SBA CGA Min Mod Max Total NT Comments   BASIC ADLs:              Upper Body   Bathing [] [] [] [] [] [] [] [] [] [x]    Lower Body Bathing [] [] [] [] [] [] [] [] [] [x]    Toileting [] [] [] [] [] [] [] [] [] [x]    Upper Body Dressing [] [] [] [] [] [] [] [] [] [x]    Lower Body Dressing [] [] [] [] [] [] [] [] [] [x]    Feeding [] [] [] [] [] [] [] [] [] [x]    Grooming [] [] [] [] [] [] [] [] [] []    Personal Device Care [] [] [] [] [] [] [] [] [] [x]    Functional Mobility [] [] [] [] [x] [] [] [] [] [] X 2 with RW    I=Independent, Mod I=Modified Independent, S=Supervision/Setup, SBA=Standby Assistance, CGA=Contact Guard Assistance, Min=Minimal Assistance, Mod=Moderate Assistance, Max=Maximal Assistance, Total=Total Assistance, NT=Not Tested    BALANCE: Good Fair+ Fair Fair- Poor NT Comments   Sitting Static [x] [] [] [] [] []    Sitting Dynamic [x] [] [] [] [] []              Standing Static [] [x] [] [] [] []    Standing Dynamic [] [] [x] [] [] []        PLAN:     FREQUENCY/DURATION   OT Plan of Care: 3 times/week for duration of hospital stay or until stated goals are met, whichever comes first.    TREATMENT:     TREATMENT:   Therapeutic Activity (27 Minutes): Therapeutic activity included Supine to Sit, Sit to Supine, Scooting, Transfer Training, Ambulation on level ground, and Standing balance to improve functional Activity tolerance, Balance, Coordination, Mobility, and Strength.     TREATMENT GRID:  N/A    AFTER TREATMENT PRECAUTIONS: Alarm Activated, Bed, Bed/Chair Locked, Call light within reach, Needs within reach, RN notified, and Side rails x2    INTERDISCIPLINARY COLLABORATION:  RN/ PCT, PT/ PTA, OT/ FARNSWORTH, and OTS    EDUCATION:       TOTAL TREATMENT DURATION AND TIME:  Time In: 1413  Time Out: 9515 [facility]  Minutes: 1500 [facility], [facility],

## 2022-10-20 NOTE — PROGRESS NOTES
TRANSFER - IN REPORT:    Verbal report received from [address] [place] [address] on Jermaine Vann  being received from IR for routine progression of patient care      Report consisted of patient's Situation, Background, Assessment and   Recommendations(SBAR). Information from the following report(s) Nurse Handoff Report was reviewed with the receiving nurse. Opportunity for questions and clarification was provided. Assessment completed upon patient's arrival to unit and care assumed.

## 2022-10-20 NOTE — PROGRESS NOTES
Hourly rounds in progress. Denies needs or pain at this time. Bed in low locked position. Call light within reach. Bed alarm on. Will continue to monitor and give report to oncoming day shift nurse.

## 2022-10-20 NOTE — PROGRESS NOTES
TRANSFER - OUT REPORT:    Verbal report given to [address] [place] [address] on Jill Darke  being transferred to  for routine progression of patient care       Report consisted of patient's Situation, Background, Assessment and   Recommendations(SBAR). Information from the following report(s) Nurse Handoff Report, Intake/Output, and MAR was reviewed with the receiving nurse. Lines:   Peripheral IV 10/16/22 Right Antecubital (Active)   Site Assessment Clean, dry & intact 10/20/22 0728   Line Status Normal saline locked; Capped;Flushed 10/20/22 [place] disinfected; Connections checked and tightened 10/20/22 0728   Phlebitis Assessment No symptoms 10/20/22 0728   Infiltration Assessment 0 10/20/22 0728   Alcohol Cap Used Yes 10/20/22 0728   Dressing Status Clean, dry & intact 10/20/22 0728   Dressing Type Transparent 10/20/22 0728        Opportunity for questions and clarification was provided.       Patient transported with:  [employer]

## 2022-10-20 NOTE — CARE COORDINATION
Dispo update:  Spoke to Mr. Nolasco in room 618 and discussed his discharge plan. Per his request, explained to him the reason for IV IG (5 doses, with last dose on Monday October 24. 2022). After that, he will be re-evaluated by Dr. Whitney Storey MD on Monday October 24, 2022 for [facility] (spoke to her in person this morning). If not accepted by [facility] IR, then he would like to go to [address] [place] [address]. Also updated Ms. [facility] admissions liaison for [address] [place] [address].  to follow.

## 2022-10-21 LAB
APPEARANCE UR: ABNORMAL
BACTERIA URNS QL MICRO: ABNORMAL /HPF
BILIRUB UR QL: NEGATIVE
CASTS URNS QL MICRO: 0 /LPF
COLOR UR: ABNORMAL
CRYSTALS URNS QL MICRO: 0 /LPF
CYTOLOGY-NON GYN: NORMAL
EPI CELLS #/AREA URNS HPF: 0 /HPF
GLUCOSE UR STRIP.AUTO-MCNC: NEGATIVE MG/DL
HGB UR QL STRIP: ABNORMAL
KETONES UR QL STRIP.AUTO: NEGATIVE MG/DL
LEUKOCYTE ESTERASE UR QL STRIP.AUTO: ABNORMAL
MUCOUS THREADS URNS QL MICRO: 0 /LPF
NITRITE UR QL STRIP.AUTO: POSITIVE
PH UR STRIP: 6.5 (ref 5–9)
PROT UR STRIP-MCNC: 300 MG/DL
RBC #/AREA URNS HPF: ABNORMAL /HPF
SP GR UR REFRACTOMETRY: 1.01 (ref 1–1.02)
SPECIMEN SOURCE: NORMAL
URINE CULTURE IF INDICATED: ABNORMAL
UROBILINOGEN UR QL STRIP.AUTO: 0.2 EU/DL (ref 0.2–1)
WBC URNS QL MICRO: >100 /HPF

## 2022-10-21 PROCEDURE — 6370000000 HC RX 637 (ALT 250 FOR IP): Performed by: INTERNAL MEDICINE

## 2022-10-21 PROCEDURE — 6370000000 HC RX 637 (ALT 250 FOR IP): Performed by: HOSPITALIST

## 2022-10-21 PROCEDURE — 6360000002 HC RX W HCPCS: Performed by: INTERNAL MEDICINE

## 2022-10-21 PROCEDURE — 81001 URINALYSIS AUTO W/SCOPE: CPT

## 2022-10-21 PROCEDURE — 6360000002 HC RX W HCPCS: Performed by: NURSE PRACTITIONER

## 2022-10-21 PROCEDURE — 87086 URINE CULTURE/COLONY COUNT: CPT

## 2022-10-21 PROCEDURE — 1100000000 HC RM PRIVATE

## 2022-10-21 PROCEDURE — APPSS45 APP SPLIT SHARED TIME 31-45 MINUTES: Performed by: NURSE PRACTITIONER

## 2022-10-21 PROCEDURE — 97530 THERAPEUTIC ACTIVITIES: CPT

## 2022-10-21 PROCEDURE — 97110 THERAPEUTIC EXERCISES: CPT

## 2022-10-21 PROCEDURE — 2580000003 HC RX 258: Performed by: INTERNAL MEDICINE

## 2022-10-21 PROCEDURE — 2580000003 HC RX 258: Performed by: HOSPITALIST

## 2022-10-21 PROCEDURE — 51702 INSERT TEMP BLADDER CATH: CPT

## 2022-10-21 RX ADMIN — CLONAZEPAM 6 MG: 1 TABLET ORAL at 21:48

## 2022-10-21 RX ADMIN — CARVEDILOL 6.25 MG: 6.25 TABLET, FILM COATED ORAL at 09:13

## 2022-10-21 RX ADMIN — ACETAMINOPHEN 1000 MG: 500 TABLET, FILM COATED ORAL at 15:08

## 2022-10-21 RX ADMIN — CEFEPIME 2000 MG: 2 INJECTION, POWDER, FOR SOLUTION INTRAVENOUS at 18:16

## 2022-10-21 RX ADMIN — Medication 3 MG: at 21:54

## 2022-10-21 RX ADMIN — FAMOTIDINE 10 MG: 20 TABLET, FILM COATED ORAL at 09:13

## 2022-10-21 RX ADMIN — CARVEDILOL 6.25 MG: 6.25 TABLET, FILM COATED ORAL at 16:44

## 2022-10-21 RX ADMIN — LEVOTHYROXINE SODIUM 125 MCG: 0.12 TABLET ORAL at 05:10

## 2022-10-21 RX ADMIN — DIPHENHYDRAMINE HYDROCHLORIDE 25 MG: 25 CAPSULE ORAL at 15:08

## 2022-10-21 RX ADMIN — OLANZAPINE 7.5 MG: 5 TABLET, FILM COATED ORAL at 21:48

## 2022-10-21 RX ADMIN — SODIUM CHLORIDE, PRESERVATIVE FREE 10 ML: 5 INJECTION INTRAVENOUS at 21:54

## 2022-10-21 RX ADMIN — IMMUNE GLOBULIN (HUMAN) 20 G: 10 INJECTION INTRAVENOUS; SUBCUTANEOUS at 15:21

## 2022-10-21 RX ADMIN — SODIUM CHLORIDE, PRESERVATIVE FREE 10 ML: 5 INJECTION INTRAVENOUS at 09:15

## 2022-10-21 RX ADMIN — AMITRIPTYLINE HYDROCHLORIDE 50 MG: 50 TABLET, FILM COATED ORAL at 21:54

## 2022-10-21 NOTE — PROGRESS NOTES
Hospitalist Progress Note   Admit Date:  10/16/2022  8:25 AM   Name:  Magen Nolasco   Age:  76 y.o. Sex:  male  :  1945   MRN:  [id number]   Room:  [facility]/    Presenting Complaint: Fatigue     Reason(s) for Admission: Dehydration [E86.0]  Exertional dyspnea [R06.09]  Leg weakness, bilateral [R29.898]  Unable to ambulate [R26.2]  Decreased activities of daily living (ADL) [Z78.9]     Hospital Course:     Uzma Brooks is a 76 y.o. 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 Patient states that he feels so weak that his legs gave out and is having balance issues with walking. Patient denies any fever chills chest pain cough shortness of breath. No palpitations. No nausea no vomiting no diarrhea. No abdominal pain. He denies any falls. Patient was afebrile, hemodynamically stable. ER physician reported that the patient's oxygen saturation dropped to 88% on room air and was placed on nasal cannula. CT head on admission negative. Hospital Course: admitted for sudden onset lower leg weakness in setting of known rectal cancer in the past. MRI spine negative for etiology. MRI brain with \"process going on in the posterior temporal region on the left the exact nature of which is unclear\" per neurology. Subjective & 24hr Events (10/21/22): Pt says he thinks his strength is improving, walking better. Urine cloudy; ?whether this could be from IVIG. Denies other symptoms such as abd pain. No fevers. Assessment & Plan:       Leg weakness, bilateral  -defer to neuro on whether higher res MRI brain (?have at [facility]) would be beneficial.  -started on IVIG 10-19. Cannot do this on 9th floor. 5d total EOT Roger 10-23  -defer LP results and further studies to neuro  -cont PT/OT  - decreased Elavil to 50 mg, plan to wean off eventually  - discontinued oxybutynin       Cloudy Urine  -Will change catheter (last done 10-10) and send fresh sample from new bag. Rectal cancer ([facility] [address]  - oupatient follow-up    # Hypothyroidism  - Synthroid 125 mcg      Diet:  ADULT DIET; Regular; Low Fat/Low Chol/High Fiber/2 gm Na  DVT PPx: Lovenox   Code status: Full Code    Hospital Problems:  Principal Problem:    Leg weakness, bilateral  Active Problems:    Unable to ambulate    Rectal cancer (HCC)    Decreased activities of daily living (ADL)    Hypothyroidism  Resolved Problems:    * No resolved hospital problems. *      Objective:   Patient Vitals for the past 24 hrs:   Temp Pulse Resp BP SpO2   10/21/22 0802 97.3 °F (36.3 °C) 80 18 127/78 96 %   10/21/22 0434 99 °F (37.2 °C) 72 19 115/76 95 %   10/20/22 2330 97.9 °F (36.6 °C) 63 16 110/65 94 %   10/20/22 1954 97.7 °F (36.5 °C) 85 18 108/64 95 %   10/20/22 1600 97.4 °F (36.3 °C) 55 16 128/79 99 %   10/20/22 1125 98.2 °F (36.8 °C) 67 16 110/75 96 %   10/20/22 0933 97.9 °F (36.6 °C) 79 16 116/79 96 %         Oxygen Therapy  SpO2: 96 %  Pulse via Oximetry: 74 beats per minute  Pulse Oximeter Device Mode: Continuous  O2 Device: None (Room air)    Estimated body mass index is 18.43 kg/m² as calculated from the following:    Height as of this encounter: 5' 8.5\" (1.74 m). Weight as of this encounter: 123 lb (55.8 kg). Intake/Output Summary (Last 24 hours) at 10/21/2022 0925  Last data filed at 10/21/2022 0434  Gross per 24 hour   Intake 830 ml   Output 2850 ml   Net -2020 ml           Physical Exam:     Blood pressure 127/78, pulse 80, temperature 97.3 °F (36.3 °C), temperature source Oral, resp. rate 18, height 5' 8.5\" (1.74 m), weight 123 lb (55.8 kg), SpO2 96 %. General:    Well nourished.     Head:  Normocephalic, atraumatic  Eyes:  Sclerae appear normal.  Pupils equally round. ENT:  Nares appear normal, no drainage. Moist oral mucosa  Neck:  No restricted ROM. Trachea midline   CV:   RRR. No jugular venous distension. Lungs:   No distress. Symmetric expansion. Abdomen:   nondistended. Extremities: No cyanosis or clubbing. No edema  Skin:     No rashes and normal coloration. Warm and dry. Neuro:  CN II-XII grossly intact. Sensation intact. Gait/balance assessment deferred. Psych:  Normal mood and affect. I have personally reviewed labs and tests showing:  Recent Labs:  Recent Results (from the past 48 hour(s))   PLEASE READ & DOCUMENT PPD TEST IN 48 HRS    Collection Time: 10/19/22  5:15 PM   Result Value Ref Range    PPD, (POC) Negative Negative    mm Induration 0 0 - 5 mm   Glucose, CSF    Collection Time: 10/20/22  8:21 AM   Result Value Ref Range    Tube Number 1      Glucose, CSF 65 40 - 70 MG/DL   Protein, CSF    Collection Time: 10/20/22  8:21 AM   Result Value Ref Range    Tube Number 1      Protein, CSF 45 15 - 45 MG/DL   Culture, CSF    Collection Time: 10/20/22  8:21 AM    Specimen: CSF   Result Value Ref Range    Special Requests NO SPECIAL REQUESTS      Gram stain NO WBCS SEEN      Gram stain NO DEFINITE ORGANISM SEEN      Culture NO GROWTH 1 DAY     Body fluid cell count    Collection Time: 10/20/22  8:21 AM   Result Value Ref Range    Specimen CEREBROSPINAL FLUID      Color, Fluid COLORLESS      Appearance, Fluid CLEAR      RBC, Fluid 1 /cu mm    WBC, Fluid 0 /cu mm   Miscellaneous Sendout    Collection Time: 10/20/22  8:21 AM   Result Value Ref Range    Test Description: 14      Reference Lab [facility]      Results: PENDING        I have personally reviewed imaging studies showing: Other Studies:  IR LUMBAR PUNCTURE FOR DIAGNOSIS   Final Result   Uncomplicated lumbar puncture.         Plan: The patient will recover at bedrest.            MRI [address]   Final Result   No evidence of metastasis to the thoracic spine. No visualized etiology for the   patient's symptoms. MRI CERVICAL SPINE W WO CONTRAST   Final Result   No visualized etiology for the patient's symptoms. No evidence of metastasis to   the cervical spine. MRI LUMBAR SPINE W WO CONTRAST   Final Result      1. No evidence of metastasis to the lumbar spine. 2.  No acute abnormality. 3.  Multilevel spondylosis and facet arthrosis. MRI BRAIN W WO CONTRAST   Final Result      Mild ill-defined nonenhancing focus of cortical/subcortical signal abnormality   at the left parieto-occipital junction, indeterminate but may represent evolving   subacute infarct or postictal changes among other etiologies. Recommend   short-term follow-up MRI in approximately 6-12 weeks to assess for   evolution/resolution. No evidence of acute ischemic infarction, intracranial hemorrhage, mass or   midline shift. Mild to moderate chronic white matter microvascular ischemic changes. CT HEAD WO CONTRAST   Final Result   No acute findings         XR CHEST (2 VW)   Final Result   No acute cardiopulmonary process.           Current Meds:  Current Facility-Administered Medications   Medication Dose Route Frequency    immune globulin (GAMUNEX-C) 10% solution 20 g  0.4 g/kg IntraVENous Daily    acetaminophen (TYLENOL) tablet 1,000 mg  1,000 mg Oral Daily    diphenhydrAMINE (BENADRYL) capsule 25 mg  25 mg Oral Daily    amitriptyline (ELAVIL) tablet 50 mg  50 mg Oral Nightly    melatonin tablet 3 mg  3 mg Oral Nightly PRN    carvedilol (COREG) tablet 6.25 mg  6.25 mg Oral BID WC    clonazePAM (KLONOPIN) tablet 6 mg  6 mg Oral Nightly PRN    famotidine (PEPCID) tablet 10 mg  10 mg Oral Daily    levothyroxine (SYNTHROID) tablet 125 mcg  125 mcg Oral Daily    OLANZapine (ZYPREXA) tablet 7.5 mg  7.5 mg Oral Nightly    sodium chloride flush 0.9 % injection 5-40 mL  5-40 mL IntraVENous 2 times per day sodium chloride flush 0.9 % injection 5-40 mL  5-40 mL IntraVENous PRN    0.9 % sodium chloride infusion   IntraVENous PRN    [Held by provider] enoxaparin (LOVENOX) injection 40 mg  40 mg SubCUTAneous Daily    ondansetron (ZOFRAN-ODT) disintegrating tablet 4 mg  4 mg Oral Q8H PRN    Or    ondansetron (ZOFRAN) injection 4 mg  4 mg IntraVENous Q6H PRN    polyethylene glycol (GLYCOLAX) packet 17 g  17 g Oral Daily PRN    acetaminophen (TYLENOL) tablet 650 mg  650 mg Oral Q6H PRN    Or    acetaminophen (TYLENOL) suppository 650 mg  650 mg Rectal Q6H PRN    magnesium sulfate 2000 mg in 50 mL IVPB premix  2,000 mg IntraVENous PRN    hyoscyamine (LEVSIN/SL) sublingual tablet 125 mcg  125 mcg SubLINGual Q6H PRN       Signed:  Juan Jose Magana MD    Part of this note may have been written by using a voice dictation software. The note has been proof read but may still contain some grammatical/other typographical errors.

## 2022-10-21 NOTE — PROGRESS NOTES
John catheter changed (16Fr/10Ml) as verbally ordered by Dr. Arun Oconnor. Urine sample sent to lab.

## 2022-10-21 NOTE — PROGRESS NOTES
Neurology Daily Progress Note     Assessment:     55-year-old man with inability to ambulate. On neurological examination, he has at least moderate dysphonia. He states that this is from amitriptyline; however, his dysphonia seems out of proportion to dry mouth from anticholinergic agents. In addition, he is areflexic in his lower extremities. His brain MRI shows an area of T2 signal abnormality in the left parieto-occipital junction. The differential is broad, including inflammatory, autoimmune, and paraneoplastic causes. S/p LP with preliminary CSF studies unrevealing. Labs pending, these will likely take 10-14 days to return. NOTABLE CANCER HX  Plan:     The patient was on multiple anticholinergic medications. These have been reduced or discontinued. This may be contributing, at least in part, to patients balance issues. PT/OT    Intravenous immunoglobulin 2 grams/kg divided over 5 days (0.4 g/kg/day x 5 days). Pretreat with antihistamine and Tylenol 30 minutes prior to infusion. Subjective: Interval history:    Patient unchanged clinically. Tolerating IVIG. S/p LP, preliminary studies unrevealing. Labs pending. Christiano Montalvo is a 76 y.o. male who is being seen for imbalance.     Past Medical History:   Diagnosis Date    Acute deep vein thrombosis (DVT) of non-extremity vein 5/20/2019    Family history of malignant neoplasm of gastrointestinal tract     mother colon/ovarian cancer 67    Fecal incontinence     LAR syndrome    John catheter in place 2022    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    surgery and radiation    Psychiatric disorder     anxiety     Past Surgical History:   Procedure Laterality Date    COLONOSCOPY  last 2/3/15    Roberto--no polyps--3 year recall    COLONOSCOPY N/A 3/5/2021    COLONOSCOPY/BMI 19 performed by Jessica Ram MD at [facility]    COLONOSCOPY N/A 2/12/2018    COLONOSCOPY / BMI=21 performed by Yves Rivero MD at [place] [address]  2/12/2018         COLONOSCOPY THRU STOMA,LESN RMVL W/SNARE  3/5/2021         COLOSTOMY  5/11/16    CT RETROPERITONEAL PERC DRAIN  5/13/2021    CT RETROPERITONEAL PERC DRAIN 5/13/2021 [facility] CT SCAN    CT RETROPERITONEAL PERC DRAIN  5/20/2021    CT RETROPERITONEAL PERC DRAIN 5/20/2021 [facility] CT SCAN    GI  4/2016    Sacral nerve stimlator lead trial (unsucessful) and removal    HERNIA REPAIR      and Colostomy in May    HERNIA REPAIR  3/2015, 5/2016    IR ABSCESS EVAL THRU EXISTING CATH  11/24/2021    IR ABSCESS EVAL THRU EXISTING CATH  6/4/2021    IR ABSCESS EVAL THRU EXISTING CATH  5/20/2021    IR NEPHROSTOGRAM EXISTING ACCESS  12/10/2021    IR NEPHROSTOGRAM EXISTING ACCESS  10/4/2021    IR NEPHROSTOMY EXCHANGE CATHETER  12/7/2021    IR NEPHROSTOMY EXCHANGE CATHETER  11/3/2021    IR NEPHROSTOMY EXCHANGE CATHETER  10/28/2021    IR NEPHROSTOMY PERCUTANEOUS LEFT  9/16/2021    IR NEPHROSTOMY PERCUTANEOUS LEFT  9/16/2021    IR NEPHROSTOMY PERCUTANEOUS LEFT 9/16/2021 [facility]    IR REPLCE T CVC WO PORT SAME ACC  5/30/2019    IR TUBE CHANGE  8/5/2021    IR TUBE CHANGE  7/8/2021    IR TUBE CHANGE  6/14/2021    IR TUBE CHANGE  5/27/2021    IR TUNNELED CATHETER PLACEMENT GREATER THAN 5 YEARS  5/20/2019    IR TUNNELED CATHETER PLACEMENT GREATER THAN 5 YEARS  5/20/2019    IR TUNNELED CATHETER PLACEMENT GREATER THAN 5 YEARS 5/20/2019 [facility]    OTHER SURGICAL HISTORY Bilateral     cataracts  2017    OTHER SURGICAL HISTORY  10/7/2013    Roberto---endorectal us    POLYPECTOMY      TOTAL COLECTOMY  2013    Roberto--lap LAR with coloproctostomy, mobilization splenic flexure, diverting loop ileostomy    TOTAL COLECTOMY Right 2014    for leak    VASCULAR SURGERY Left     single lumen port/subsequently removed      Family History   Problem Relation Age of Onset    Cancer Mother         colon and ovarian    Cancer Brother         prostate    Alcohol Abuse Brother     Cancer Maternal Grandmother         bone    Alcohol Abuse Father     Stroke Paternal Grandfather     Alcohol Abuse Sister      Social History     Tobacco Use    Smoking status: Former     Packs/day: 1.00     Types: Cigarettes     Quit date: 1963     Years since quittin.9    Smokeless tobacco: Never   Substance Use Topics    Alcohol use: Not Currently     Alcohol/week: 11.7 standard drinks      Current Facility-Administered Medications   Medication Dose Route Frequency Provider Last Rate Last Admin    immune globulin (GAMUNEX-C) 10% solution 20 g  0.4 g/kg IntraVENous Daily GERMAN Mckenna 33 mL/hr at 10/20/22 1606 20 g at 10/20/22 160    acetaminophen (TYLENOL) tablet 1,000 mg  1,000 mg Oral Daily Carolyn Richter MD   1,000 mg at 10/20/22 1539    diphenhydrAMINE (BENADRYL) capsule 25 mg  25 mg Oral Daily Carolyn Richter MD   25 mg at 10/20/22 153    amitriptyline (ELAVIL) tablet 50 mg  50 mg Oral Nightly Gen Ciesco, DO   50 mg at 10/20/22 2116    melatonin tablet 3 mg  3 mg Oral Nightly PRN Gen Ciesco, DO   3 mg at 10/20/22 2116    carvedilol (COREG) tablet 6.25 mg  6.25 mg Oral BID WC Janice Swan MD   6.25 mg at 10/21/22 0913    clonazePAM (KLONOPIN) tablet 6 mg  6 mg Oral Nightly PRN Janice Swan MD   6 mg at 10/20/22 2116    famotidine (PEPCID) tablet 10 mg  10 mg Oral Daily Janice Swan MD   10 mg at 10/21/22 0913    levothyroxine (SYNTHROID) tablet 125 mcg  125 mcg Oral Daily Julian Soto Ala, MD   125 mcg at 10/21/22 0510    OLANZapine (ZYPREXA) tablet 7.5 mg  7.5 mg Oral Nightly Janice Swan MD   7.5 mg at 10/20/22 2119    sodium chloride flush 0.9 % injection 5-40 mL  5-40 mL IntraVENous 2 times per day Janice Swan MD   10 mL at 10/21/22 0915    sodium chloride flush 0.9 % injection 5-40 mL  5-40 mL IntraVENous PRN Janice Swan MD        0.9 % sodium chloride infusion   IntraVENous PRN Julian Soto Ala, MD        enoxaparin (LOVENOX) injection 40 mg  40 mg SubCUTAneous Daily Janice Swan MD   40 mg at 10/19/22 0833    ondansetron (ZOFRAN-ODT) disintegrating tablet 4 mg  4 mg Oral Q8H PRN Julian Soto Ala, MD        Or    ondansetron (ZOFRAN) injection 4 mg  4 mg IntraVENous Q6H PRN Janice Swan MD        polyethylene glycol (GLYCOLAX) packet 17 g  17 g Oral Daily PRN Julian Soto Ala, MD        acetaminophen (TYLENOL) tablet 650 mg  650 mg Oral Q6H PRN Julian Soto Ala, MD   650 mg at 10/20/22 0669    Or    acetaminophen (TYLENOL) suppository 650 mg  650 mg Rectal Q6H PRN Janice Swan MD        magnesium sulfate 2000 mg in 50 mL IVPB premix  2,000 mg IntraVENous PRN Julian Soto Ala, MD        hyoscyamine (LEVSIN/SL) sublingual tablet 125 mcg  125 mcg SubLINGual Q6H PRN Janice Swan MD            No Known Allergies    Review of systems negative with exception of pertinent positives and negatives noted above.        Objective:     Vitals:    10/20/22 1954 10/20/22 2330 10/21/22 0434 10/21/22 0802   BP: 108/64 110/65 115/76 127/78   Pulse: 85 63 72 80   Resp: 18 16 19 18   Temp: 97.7 °F (36.5 °C) 97.9 °F (36.6 °C) 99 °F (37.2 °C) 97.3 °F (36.3 °C)   TempSrc: Oral Oral Oral Oral   SpO2: 95% 94% 95% 96%   Weight:       Height:              Current Facility-Administered Medications:     immune globulin (GAMUNEX-C) 10% solution 20 g, 0.4 g/kg, IntraVENous, Daily, GERMAN Bonilla, Last Rate: 33 mL/hr at 10/20/22 1606, 20 g at 10/20/22 1606    acetaminophen (TYLENOL) tablet 1,000 mg, 1,000 mg, Oral, Daily, Juwan Bobby Eliseo Cazares MD, 1,000 mg at 10/20/22 1539    diphenhydrAMINE (BENADRYL) capsule 25 mg, 25 mg, Oral, Daily, Mayra Jordan MD, 25 mg at 10/20/22 1539    amitriptyline (ELAVIL) tablet 50 mg, 50 mg, Oral, Nightly, Gen Mcmahon, DO, 50 mg at 10/20/22 2116    melatonin tablet 3 mg, 3 mg, Oral, Nightly PRN, Nora Todd, DO, 3 mg at 10/20/22 2116    carvedilol (COREG) tablet 6.25 mg, 6.25 mg, Oral, BID WC, Janice Swan MD, 6.25 mg at 10/21/22 0913    clonazePAM (KLONOPIN) tablet 6 mg, 6 mg, Oral, Nightly PRN, Karmen Harrell Ala, MD, 6 mg at 10/20/22 2116    famotidine (PEPCID) tablet 10 mg, 10 mg, Oral, Daily, Karmen Harrell Ala, MD, 10 mg at 10/21/22 0913    levothyroxine (SYNTHROID) tablet 125 mcg, 125 mcg, Oral, Daily, Karmen Harrell Ala, MD, 125 mcg at 10/21/22 0510    OLANZapine (ZYPREXA) tablet 7.5 mg, 7.5 mg, Oral, Nightly, Janice Swan MD, 7.5 mg at 10/20/22 2119    sodium chloride flush 0.9 % injection 5-40 mL, 5-40 mL, IntraVENous, 2 times per day, Janice Swan MD, 10 mL at 10/21/22 0915    sodium chloride flush 0.9 % injection 5-40 mL, 5-40 mL, IntraVENous, PRN, Karmen Harrell Ala, MD    0.9 % sodium chloride infusion, , IntraVENous, PRN, Karmen Harrell Ala, MD    enoxaparin (LOVENOX) injection 40 mg, 40 mg, SubCUTAneous, Daily, Karmen Harrell Ala, MD, 40 mg at 10/19/22 0833    ondansetron (ZOFRAN-ODT) disintegrating tablet 4 mg, 4 mg, Oral, Q8H PRN **OR** ondansetron (ZOFRAN) injection 4 mg, 4 mg, IntraVENous, Q6H PRN, Karmen Harrell Ala, MD    polyethylene glycol (GLYCOLAX) packet 17 g, 17 g, Oral, Daily PRN, Karmen Harrell Ala, MD    acetaminophen (TYLENOL) tablet 650 mg, 650 mg, Oral, Q6H PRN, 650 mg at 10/20/22 0936 **OR** acetaminophen (TYLENOL) suppository 650 mg, 650 mg, Rectal, Q6H PRN, Karmen Harrell Ala, MD    magnesium sulfate 2000 mg in 50 mL IVPB premix, 2,000 mg, IntraVENous, PRN, Karmen Harrell Ala, MD    hyoscyamine (LEVSIN/SL) sublingual tablet 125 mcg, 125 mcg, SubLINGual, Q6H PRN, Karmen Harrell Ala, MD    No results found for this or any previous visit (from the past 12 hour(s)). CT Results (most recent):  CT reviewed. Results do not populate into note    Most recent MRI   No results found for this or any previous visit. Most recent MRA   No results found for this or any previous visit. Most recent CTA  No results found for this or any previous visit. Most recent Echo  No results found for this or any previous visit. Physical Exam:  General -: Borderline cachectic. Pleasant and conversant. Dysphonic  HEENT - Normocephalic, atraumatic. Conjunctiva, tympanic membranes, and oropharynx are clear. Neck - Supple without masses, no bruits   Extremities - Peripheral pulses intact. No edema and no rashes. Neurological examination - Comprehension, attention , memory and reasoning are intact. Language is normal.  Speech is at least moderately dysphonic. On cranial nerve examination pupils are equal round and reactive to light. Fundoscopic examination is normal. Visual acuity is adequate. Visual fields are full to finger confrontation. Extraocular motility is normal. Face is symmetric and sensation is intact to light touch. Hearing is intact to finger rustle bilaterally. Motor examination - There is normal muscle tone and bulk. Power is full throughout with exception to 2/5 strength of the iliopsoas and 4 -/5 strength of the quadriceps which is chronic. Muscle stretch reflexes are absent at the patella and Achilles, bilaterally. Sensation is intact to light touch, pinprick, vibration and proprioception in all extremities. He may have some mild proprioceptive deficits in the right lower limb. Cerebellar examination is normal.  Cannot ambulate or stand without significant help    Signed By: GERMAN Zeng     October 21, 2022    Neurology attending physician note    I have seen and examined the patient together with midlevel practitioner Bhupinder Chan  I have additionally performed independent evaluation as follows  Additional aspects of the history include /a/a  see my addition re CANCER HX        Additional or different features identified on the neurological or general physical examination  Looks better on IVIG can continue into rehab      Pertinent additional imaging review on the PACS system review of laboratories and other data            Impression, diagnostic testing and Plan awaiting return of paraneoplastic panel which I suspect will confirm hopefully do the correct dose of the IVIG course  This will need doubtless to be repeated  He will require skilled neurologic follow-up in this regard              Have discussed the patient in addition with:

## 2022-10-21 NOTE — CARE COORDINATION
Patient has been accepted at [facility]. If medically stable patient can be discharge to [facility] on Monday (pending bed availability)  CM will continue to monitor.

## 2022-10-21 NOTE — PROGRESS NOTES
Hourly rounds in progress. States is feeling stronger. Assist to empty colostomy. Denies needs or pain at this time. Bed in low locked position. Call light within reach. Bed alarm on. Will continue to monitor and give report to oncoming day shift nurse.

## 2022-10-21 NOTE — PROGRESS NOTES
ACUTE PHYSICAL THERAPY GOALS:   (Developed with and agreed upon by patient and/or caregiver.)  (1.)Mr. Nolasco will move from supine to sit and sit to supine , scoot up and down, and roll side to side in bed with INDEPENDENCE within 3 treatment day(s). (2.)Mr. Nolasco will transfer from bed to chair and chair to bed with INDEPENDENCE using the least restrictive device within 7 treatment day(s). (3.)Mr. Nolasco will ambulate with MODIFIED INDEPENDENCE for a minimum of 500 feet with the least restrictive device within 7 treatment day(s). (4.)Mr. Nolasco will ambulate up/down 12 steps with use of HR and MODIFIED INDEPENDENCE in order to safely get to his bedroom within 7 treatment days. PHYSICAL THERAPY: Daily Note AM   (Link to Caseload Tracking: PT Visit Days : 3  Time In/Out PT Charge Capture  Rehab Caseload Tracker  Orders    Getachew Hernandez is a 76 y.o. male   PRIMARY DIAGNOSIS: Leg weakness, bilateral  Dehydration [E86.0]  Exertional dyspnea [R06.09]  Leg weakness, bilateral [R29.898]  Unable to ambulate [R26.2]  Decreased activities of daily living (ADL) [Z78.9]       Inpatient: Payor: MEDICARE / Plan: MEDICARE PART A AND B / Product Type: *No Product type* /     ASSESSMENT:     REHAB RECOMMENDATIONS:   Recommendation to date pending progress:  Setting:  Inpatient Rehab Facility    Equipment:    Rolling Walker     ASSESSMENT:  Mr. Fe Chaudhari presents supine and is very agreeable to therapy. He wants to do as much therapy as he can. He sat to side of bed then stood and ambulated in perez as below using rolling walker. He does not normally use RW at home and so we ambulated about [de-identified]' with no assistive device and CGA. He sat to rest on side of bed then stood and performed standing balance activities as below. He returned to side of bed to eat his lunch. Good progress towards goals. Will continue with POC.           SUBJECTIVE:   Mr. Fe Chaudhari states, \"I want to get better so I can get home\" Social/Functional Lives With: Alone  Type of Home: House  Home Layout: Two level  Bathroom Shower/Tub: Tub/Shower unit  ADL Assistance: Independent  Homemaking Assistance: Independent  Active : Yes  OBJECTIVE:     PAIN: Lu Revering / O2: PRECAUTION / Tash Keene / Radha Cough:   Pre Treatment: 0         Post Treatment: 0 Vitals        Oxygen    IV    RESTRICTIONS/PRECAUTIONS:        MOBILITY: I Mod I S SBA CGA Min Mod Max Total  NT x2 Comments:   Bed Mobility    Rolling [] [] [] [] [] [] [] [] [] [] []    Supine to Sit [] [] [] [x] [] [] [] [] [] [] []    Scooting [] [] [] [x] [] [] [] [] [] [] []    Sit to Supine [] [] [] [] [] [] [] [] [] [] []    Transfers    Sit to Stand [] [] [] [] [x] [] [] [] [] [] []    Bed to Chair [] [] [] [] [] [] [] [] [] [] []    Stand to Sit [] [] [] [] [x] [] [] [] [] [] []     [] [] [] [] [] [] [] [] [] [] []    I=Independent, Mod I=Modified Independent, S=Supervision, SBA=Standby Assistance, CGA=Contact Guard Assistance,   Min=Minimal Assistance, Mod=Moderate Assistance, Max=Maximal Assistance, Total=Total Assistance, NT=Not Tested    BALANCE: Good Fair+ Fair Fair- Poor NT Comments   Sitting Static [] [] [] [] [] []    Sitting Dynamic [] [] [] [] [] []              Standing Static [] [] [] [] [] []    Standing Dynamic [] [] [] [] [] []      GAIT: I Mod I S SBA CGA Min Mod Max Total  NT x2 Comments:   Level of Assistance [] [] [] [] [x] [x] [] [] [] [] [x]    Distance   feet    DME Rolling Walker    Gait Quality Decreased zoe , Decreased step length, Path deviations , Trunk sway increased, Wide base of support, and hip and knee flexion, B LE ER    Weightbearing Status      Stairs      I=Independent, Mod I=Modified Independent, S=Supervision, SBA=Standby Assistance, CGA=Contact Guard Assistance,   Min=Minimal Assistance, Mod=Moderate Assistance, Max=Maximal Assistance, Total=Total Assistance, NT=Not Tested    PLAN:   FREQUENCY AND DURATION: 3 times/week for duration of hospital stay or until stated goals are met, whichever comes first.    TREATMENT:   TREATMENT:   Therapeutic Activity (15 Minutes): Therapeutic activity included Supine to Sit, Scooting, Ambulation on level ground, and Standing balance to improve functional Activity tolerance, Balance, Coordination, and Strength. Therapeutic Exercise (15 Minutes): Therapeutic exercises noted below to improve functional activity tolerance, AROM, strength, and mobility.      TREATMENT GRID:   Date:  10/18/22 Date:  10/21/22 Date:     Activity/Exercise Parameters Parameters Parameters   Standing heel raises X 15 B X 15 B    Standing hip flexion X 10 B X 15 B    Standing hip abd X 10 B     Standing tolerance with activity ~10 minutes X 12 minutes    Standing and reaching for objects in all planes ~ 2 minutes     Braiding holding to rail in perez  4 x 10'    Walking backwards  4 x 10'        AFTER TREATMENT PRECAUTIONS: Alarm Activated, Bed, Bed/Chair Locked, Call light within reach, Needs within reach, and RN notified    INTERDISCIPLINARY COLLABORATION:  RN/ PCT and PT/ PTA    EDUCATION:      TIME IN/OUT:  Time In: 1145  Time Out: 1215  Minutes: 30    TASHI PERDOZA PTA

## 2022-10-22 PROBLEM — Z78.9 DECREASED ACTIVITIES OF DAILY LIVING (ADL): Chronic | Status: ACTIVE | Noted: 2019-05-19

## 2022-10-22 PROBLEM — R26.2 UNABLE TO AMBULATE: Status: RESOLVED | Noted: 2022-10-17 | Resolved: 2022-10-22

## 2022-10-22 PROBLEM — E03.9 HYPOTHYROIDISM: Chronic | Status: ACTIVE | Noted: 2019-06-14

## 2022-10-22 PROCEDURE — 2580000003 HC RX 258: Performed by: INTERNAL MEDICINE

## 2022-10-22 PROCEDURE — 1100000000 HC RM PRIVATE

## 2022-10-22 PROCEDURE — 6370000000 HC RX 637 (ALT 250 FOR IP): Performed by: INTERNAL MEDICINE

## 2022-10-22 PROCEDURE — 6360000002 HC RX W HCPCS: Performed by: HOSPITALIST

## 2022-10-22 PROCEDURE — 2580000003 HC RX 258: Performed by: HOSPITALIST

## 2022-10-22 PROCEDURE — 6360000002 HC RX W HCPCS: Performed by: INTERNAL MEDICINE

## 2022-10-22 PROCEDURE — 51702 INSERT TEMP BLADDER CATH: CPT

## 2022-10-22 PROCEDURE — 6370000000 HC RX 637 (ALT 250 FOR IP): Performed by: HOSPITALIST

## 2022-10-22 PROCEDURE — 6360000002 HC RX W HCPCS: Performed by: NURSE PRACTITIONER

## 2022-10-22 RX ADMIN — LEVOTHYROXINE SODIUM 125 MCG: 0.12 TABLET ORAL at 06:10

## 2022-10-22 RX ADMIN — SODIUM CHLORIDE, PRESERVATIVE FREE 10 ML: 5 INJECTION INTRAVENOUS at 09:12

## 2022-10-22 RX ADMIN — IMMUNE GLOBULIN (HUMAN) 20 G: 10 INJECTION INTRAVENOUS; SUBCUTANEOUS at 14:45

## 2022-10-22 RX ADMIN — CARVEDILOL 6.25 MG: 6.25 TABLET, FILM COATED ORAL at 09:12

## 2022-10-22 RX ADMIN — CEFEPIME 2000 MG: 2 INJECTION, POWDER, FOR SOLUTION INTRAVENOUS at 17:43

## 2022-10-22 RX ADMIN — ENOXAPARIN SODIUM 40 MG: 100 INJECTION SUBCUTANEOUS at 09:13

## 2022-10-22 RX ADMIN — ACETAMINOPHEN 1000 MG: 500 TABLET, FILM COATED ORAL at 13:44

## 2022-10-22 RX ADMIN — Medication 3 MG: at 21:38

## 2022-10-22 RX ADMIN — CLONAZEPAM 6 MG: 1 TABLET ORAL at 21:38

## 2022-10-22 RX ADMIN — AMITRIPTYLINE HYDROCHLORIDE 50 MG: 50 TABLET, FILM COATED ORAL at 21:38

## 2022-10-22 RX ADMIN — OLANZAPINE 7.5 MG: 5 TABLET, FILM COATED ORAL at 21:38

## 2022-10-22 RX ADMIN — FAMOTIDINE 10 MG: 20 TABLET, FILM COATED ORAL at 09:12

## 2022-10-22 RX ADMIN — SODIUM CHLORIDE, PRESERVATIVE FREE 10 ML: 5 INJECTION INTRAVENOUS at 21:39

## 2022-10-22 RX ADMIN — CARVEDILOL 6.25 MG: 6.25 TABLET, FILM COATED ORAL at 17:43

## 2022-10-22 RX ADMIN — DIPHENHYDRAMINE HYDROCHLORIDE 25 MG: 25 CAPSULE ORAL at 13:44

## 2022-10-22 ASSESSMENT — PAIN SCALES - GENERAL
PAINLEVEL_OUTOF10: 0
PAINLEVEL_OUTOF10: 0

## 2022-10-22 NOTE — PROGRESS NOTES
Hospitalist Progress Note   Admit Date:  10/16/2022  8:25 AM   Name:  Peterson Nolasco   Age:  76 y.o. Sex:  male  :  1945   MRN:  [id number]   Room:  [facility]    Presenting Complaint: Fatigue     Reason(s) for Admission: Dehydration [E86.0]  Exertional dyspnea [R06.09]  Leg weakness, bilateral [R29.898]  Unable to ambulate [R26.2]  Decreased activities of daily living (ADL) [Z78.9]     Hospital Course:     Geoff Thrasher is a 76 y.o. 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 Patient states that he feels so weak that his legs gave out and is having balance issues with walking. Patient denies any fever chills chest pain cough shortness of breath. No palpitations. No nausea no vomiting no diarrhea. No abdominal pain. He denies any falls. Patient was afebrile, hemodynamically stable. ER physician reported that the patient's oxygen saturation dropped to 88% on room air and was placed on nasal cannula. CT head on admission negative. Hospital Course: admitted for sudden onset lower leg weakness in setting of known rectal cancer in the past. MRI spine negative for etiology. MRI brain with \"process going on in the posterior temporal region on the left the exact nature of which is unclear\" per neurology. Subjective & 24hr Events (10/22/22): Pt upset that he has to have someone with him to walk. He is ambulating very well long distances with walker unassisted. Feeling stronger. No complaints      Assessment & Plan:       Leg weakness, bilateral  -last dose of IVIG tomorrow. To rehab after that.   No other medical reason to keep in hospital.  He is ambulating much better.  -cont PT/OT  - decreased Elavil to 50 mg, plan to wean off eventually  - discontinued oxybutynin       UTI  -cont cefepime. Follow urine culture; NGTD. Catheter was changed 10-21      Rectal cancer ([facility] [address]  - oupatient follow-up    # Hypothyroidism  - Synthroid 125 mcg      Diet:  ADULT DIET; Regular; Low Fat/Low Chol/High Fiber/2 gm Na  DVT PPx: Lovenox   Code status: Full Code    Hospital Problems:  Principal Problem:    Leg weakness, bilateral  Active Problems:    Unable to ambulate    Rectal cancer (HCC)    Decreased activities of daily living (ADL)    Hypothyroidism  Resolved Problems:    * No resolved hospital problems. *      Objective:   Patient Vitals for the past 24 hrs:   Temp Pulse Resp BP SpO2   10/22/22 0745 98.5 °F (36.9 °C) (!) 101 18 (!) 136/93 94 %   10/22/22 0325 98.8 °F (37.1 °C) 79 20 126/77 --   10/21/22 2306 98.6 °F (37 °C) 62 16 104/66 96 %   10/21/22 2023 99 °F (37.2 °C) 63 16 118/66 93 %   10/21/22 1620 98.4 °F (36.9 °C) 73 18 119/75 97 %   10/21/22 1119 98.4 °F (36.9 °C) 63 16 120/73 98 %         Oxygen Therapy  SpO2: 94 %  Pulse via Oximetry: 74 beats per minute  Pulse Oximeter Device Mode: Continuous  O2 Device: None (Room air)    Estimated body mass index is 18.43 kg/m² as calculated from the following:    Height as of this encounter: 5' 8.5\" (1.74 m). Weight as of this encounter: 123 lb (55.8 kg). Intake/Output Summary (Last 24 hours) at 10/22/2022 1102  Last data filed at 10/22/2022 0515  Gross per 24 hour   Intake --   Output 2100 ml   Net -2100 ml           Physical Exam:     Blood pressure (!) 136/93, pulse (!) 101, temperature 98.5 °F (36.9 °C), temperature source Oral, resp. rate 18, height 5' 8.5\" (1.74 m), weight 123 lb (55.8 kg), SpO2 94 %. General:    Well nourished. Head:  Normocephalic, atraumatic  Eyes:  Sclerae appear normal.  Pupils equally round. ENT:  Nares appear normal, no drainage. Moist oral mucosa  Neck:  No restricted ROM.   Trachea midline   CV:   RRR. No jugular venous distension. Lungs:   No distress. Symmetric expansion. Abdomen:   nondistended. Extremities: No cyanosis or clubbing. No edema  Skin:     No rashes and normal coloration. Warm and dry. Neuro:  CN II-XII grossly intact. Sensation intact. Gait/balance assessment deferred. Psych:  Normal mood and affect. I have personally reviewed labs and tests showing:  Recent Labs:  Recent Results (from the past 48 hour(s))   Cytology, Non-Gyn    Collection Time: 10/20/22  2:45 PM   Result Value Ref Range    Cytology-Non Gyn        Specimen received in cytology. Pathology report generally follows within three business days, unless special studies (i.e. immunohistochemistry, molecular studies, etc) are required. Specimen source CEREBROSPINAL FLUID     Urinalysis with Reflex to Culture    Collection Time: 10/21/22 10:26 AM    Specimen: Urine   Result Value Ref Range    Color, UA YELLOW/STRAW      Appearance TURBID      Specific Gravity, UA 1.015 1.001 - 1.023      pH, Urine 6.5 5.0 - 9.0      Protein,  (A) NEG mg/dL    Glucose, UA Negative mg/dL    Ketones, Urine Negative NEG mg/dL    Bilirubin Urine Negative NEG      Blood, Urine LARGE (A) NEG      Urobilinogen, Urine 0.2 0.2 - 1.0 EU/dL    Nitrite, Urine Positive (A) NEG      Leukocyte Esterase, Urine LARGE (A) NEG      WBC, UA >100 0 /hpf    RBC, UA 20-50 0 /hpf    BACTERIA, URINE 4+ (H) 0 /hpf    Urine Culture if Indicated URINE CULTURE ORDERED      Epithelial Cells UA 0 0 /hpf    Casts 0 0 /lpf    Crystals 0 0 /LPF    Mucus, UA 0 0 /lpf   Culture, Urine    Collection Time: 10/21/22 10:26 AM    Specimen: Urine   Result Value Ref Range    Special Requests NO SPECIAL REQUESTS  Reflexed from [id number]        Culture        No growth after short period of incubation. Further results to follow after overnight incubation. I have personally reviewed imaging studies showing:   Other Studies:  IR LUMBAR PUNCTURE FOR DIAGNOSIS   Final Result   Uncomplicated lumbar puncture. Plan: The patient will recover at bedrest.            MRI THORACIC SPINE W WO CONTRAST   Final Result   No evidence of metastasis to the thoracic spine. No visualized etiology for the   patient's symptoms. MRI CERVICAL SPINE W WO CONTRAST   Final Result   No visualized etiology for the patient's symptoms. No evidence of metastasis to   the cervical spine. MRI LUMBAR SPINE W WO CONTRAST   Final Result      1. No evidence of metastasis to the lumbar spine. 2.  No acute abnormality. 3.  Multilevel spondylosis and facet arthrosis. MRI BRAIN W WO CONTRAST   Final Result      Mild ill-defined nonenhancing focus of cortical/subcortical signal abnormality   at the left parieto-occipital junction, indeterminate but may represent evolving   subacute infarct or postictal changes among other etiologies. Recommend   short-term follow-up MRI in approximately 6-12 weeks to assess for   evolution/resolution. No evidence of acute ischemic infarction, intracranial hemorrhage, mass or   midline shift. Mild to moderate chronic white matter microvascular ischemic changes. CT HEAD WO CONTRAST   Final Result   No acute findings         XR CHEST (2 VW)   Final Result   No acute cardiopulmonary process.           Current Meds:  Current Facility-Administered Medications   Medication Dose Route Frequency    cefepime (MAXIPIME) 2,000 mg in sodium chloride 0.9 % 50 mL IVPB mini-bag  2,000 mg IntraVENous Q24H    immune globulin (GAMUNEX-C) 10% solution 20 g  0.4 g/kg IntraVENous Daily    acetaminophen (TYLENOL) tablet 1,000 mg  1,000 mg Oral Daily    diphenhydrAMINE (BENADRYL) capsule 25 mg  25 mg Oral Daily    amitriptyline (ELAVIL) tablet 50 mg  50 mg Oral Nightly    melatonin tablet 3 mg  3 mg Oral Nightly PRN    carvedilol (COREG) tablet 6.25 mg  6.25 mg Oral BID WC    clonazePAM (KLONOPIN) tablet 6 mg  6 mg Oral Nightly PRN famotidine (PEPCID) tablet 10 mg  10 mg Oral Daily    levothyroxine (SYNTHROID) tablet 125 mcg  125 mcg Oral Daily    OLANZapine (ZYPREXA) tablet 7.5 mg  7.5 mg Oral Nightly    sodium chloride flush 0.9 % injection 5-40 mL  5-40 mL IntraVENous 2 times per day    sodium chloride flush 0.9 % injection 5-40 mL  5-40 mL IntraVENous PRN    0.9 % sodium chloride infusion   IntraVENous PRN    enoxaparin (LOVENOX) injection 40 mg  40 mg SubCUTAneous Daily    ondansetron (ZOFRAN-ODT) disintegrating tablet 4 mg  4 mg Oral Q8H PRN    Or    ondansetron (ZOFRAN) injection 4 mg  4 mg IntraVENous Q6H PRN    polyethylene glycol (GLYCOLAX) packet 17 g  17 g Oral Daily PRN    acetaminophen (TYLENOL) tablet 650 mg  650 mg Oral Q6H PRN    Or    acetaminophen (TYLENOL) suppository 650 mg  650 mg Rectal Q6H PRN    magnesium sulfate 2000 mg in 50 mL IVPB premix  2,000 mg IntraVENous PRN    hyoscyamine (LEVSIN/SL) sublingual tablet 125 mcg  125 mcg SubLINGual Q6H PRN       Signed:  Mayra Jordan MD    Part of this note may have been written by using a voice dictation software. The note has been proof read but may still contain some grammatical/other typographical errors.

## 2022-10-22 NOTE — FLOWSHEET NOTE
10/22/22 1545   Vital Signs   Temp 98 °F (36.7 °C)   Temp Source Oral   Heart Rate 67   Heart Rate Source Monitor   Resp 18   /70  (after infusion)   MAP (Calculated) 84   Level of Consciousness 0   MEWS Score 1

## 2022-10-22 NOTE — FLOWSHEET NOTE
10/22/22 1430   Vital Signs   Temp 97.8 °F (36.6 °C)   Temp Source Oral   Heart Rate 67   Heart Rate Source Monitor   Resp 18   /77  (prior to infusion)   MAP (Calculated) 90.33

## 2022-10-23 PROBLEM — T83.511A URINARY TRACT INFECTION ASSOCIATED WITH INDWELLING URETHRAL CATHETER (HCC): Status: ACTIVE | Noted: 2022-10-23

## 2022-10-23 PROBLEM — N39.0 URINARY TRACT INFECTION ASSOCIATED WITH INDWELLING URETHRAL CATHETER (HCC): Status: ACTIVE | Noted: 2022-10-23

## 2022-10-23 LAB
BACTERIA SPEC CULT: NORMAL
BACTERIA SPEC CULT: NORMAL
SERVICE CMNT-IMP: NORMAL

## 2022-10-23 PROCEDURE — 6370000000 HC RX 637 (ALT 250 FOR IP): Performed by: INTERNAL MEDICINE

## 2022-10-23 PROCEDURE — 6370000000 HC RX 637 (ALT 250 FOR IP): Performed by: HOSPITALIST

## 2022-10-23 PROCEDURE — 6360000002 HC RX W HCPCS: Performed by: INTERNAL MEDICINE

## 2022-10-23 PROCEDURE — 6360000002 HC RX W HCPCS: Performed by: NURSE PRACTITIONER

## 2022-10-23 PROCEDURE — 2580000003 HC RX 258: Performed by: HOSPITALIST

## 2022-10-23 PROCEDURE — 6360000002 HC RX W HCPCS: Performed by: HOSPITALIST

## 2022-10-23 PROCEDURE — 51702 INSERT TEMP BLADDER CATH: CPT

## 2022-10-23 PROCEDURE — 99232 SBSQ HOSP IP/OBS MODERATE 35: CPT | Performed by: PSYCHIATRY & NEUROLOGY

## 2022-10-23 PROCEDURE — 1100000000 HC RM PRIVATE

## 2022-10-23 PROCEDURE — 2580000003 HC RX 258: Performed by: INTERNAL MEDICINE

## 2022-10-23 RX ORDER — CEFDINIR 300 MG/1
300 CAPSULE ORAL 2 TIMES DAILY
Qty: 10 CAPSULE | Refills: 0
Start: 2022-10-23 | End: 2022-10-28

## 2022-10-23 RX ORDER — CLONAZEPAM 2 MG/1
4 TABLET ORAL NIGHTLY PRN
Qty: 6 TABLET | Refills: 0 | Status: ON HOLD | OUTPATIENT
Start: 2022-10-23 | End: 2022-11-18 | Stop reason: HOSPADM

## 2022-10-23 RX ORDER — AMITRIPTYLINE HYDROCHLORIDE 50 MG/1
50 TABLET, FILM COATED ORAL NIGHTLY
Qty: 30 TABLET | Refills: 1 | Status: SHIPPED | OUTPATIENT
Start: 2022-10-23

## 2022-10-23 RX ORDER — CEFDINIR 300 MG/1
300 CAPSULE ORAL EVERY 12 HOURS SCHEDULED
Status: DISCONTINUED | OUTPATIENT
Start: 2022-10-23 | End: 2022-10-24 | Stop reason: HOSPADM

## 2022-10-23 RX ADMIN — Medication 3 MG: at 22:04

## 2022-10-23 RX ADMIN — LEVOTHYROXINE SODIUM 125 MCG: 0.12 TABLET ORAL at 06:21

## 2022-10-23 RX ADMIN — CLONAZEPAM 6 MG: 1 TABLET ORAL at 22:04

## 2022-10-23 RX ADMIN — CEFDINIR 300 MG: 300 CAPSULE ORAL at 21:34

## 2022-10-23 RX ADMIN — OLANZAPINE 7.5 MG: 5 TABLET, FILM COATED ORAL at 21:34

## 2022-10-23 RX ADMIN — IMMUNE GLOBULIN (HUMAN) 20 G: 10 INJECTION INTRAVENOUS; SUBCUTANEOUS at 17:53

## 2022-10-23 RX ADMIN — FAMOTIDINE 10 MG: 20 TABLET, FILM COATED ORAL at 08:37

## 2022-10-23 RX ADMIN — SODIUM CHLORIDE, PRESERVATIVE FREE 10 ML: 5 INJECTION INTRAVENOUS at 21:35

## 2022-10-23 RX ADMIN — CEFEPIME 2000 MG: 2 INJECTION, POWDER, FOR SOLUTION INTRAVENOUS at 13:36

## 2022-10-23 RX ADMIN — ACETAMINOPHEN 1000 MG: 500 TABLET, FILM COATED ORAL at 13:36

## 2022-10-23 RX ADMIN — DIPHENHYDRAMINE HYDROCHLORIDE 25 MG: 25 CAPSULE ORAL at 13:34

## 2022-10-23 RX ADMIN — CARVEDILOL 6.25 MG: 6.25 TABLET, FILM COATED ORAL at 08:37

## 2022-10-23 RX ADMIN — AMITRIPTYLINE HYDROCHLORIDE 50 MG: 50 TABLET, FILM COATED ORAL at 21:34

## 2022-10-23 RX ADMIN — SODIUM CHLORIDE, PRESERVATIVE FREE 10 ML: 5 INJECTION INTRAVENOUS at 08:40

## 2022-10-23 RX ADMIN — CARVEDILOL 6.25 MG: 6.25 TABLET, FILM COATED ORAL at 18:12

## 2022-10-23 RX ADMIN — ENOXAPARIN SODIUM 40 MG: 100 INJECTION SUBCUTANEOUS at 08:37

## 2022-10-23 ASSESSMENT — PAIN SCALES - GENERAL: PAINLEVEL_OUTOF10: 0

## 2022-10-23 NOTE — CARE COORDINATION
CM met with patient to see if he would like to discharge home with [facility] since he is ambulating well. Patient states that he thinks he should still go to rehab - he has 12 steps inside of his home (patient lives in a town home) and he thinks he still needs some rehab before discharging home. Dr. Coco Diaz who states that bed at [facility] will likely not be available until Wednesday. CM messaged Ignacio Allan with [facility] to see if patient can discharge there today. CM awaiting response. 12:20 - CM attempted to call Ignacio Allan, unable to reach her. CM also attempted to call Jackie Houghton with [facility], phone off also. CM will keep trying as we need confirmation from facility that patient can discharge there today. 12:30 - ADRIANA spoke with personnel at [facility] facility to see if they had another contact for me to speak about patient discharging to facility today. They could not advise but states they will try and reach Ignacio Allan or [email or web address] and will call CM back. 1:22 - ADRIANA spoke with Ignacio Allan who states that she was told that patient would be reevaluated on Monday and patient would either be going to [facility] or [facility]. She was told that patient would not discharge over the weekend so bed has been filled but she states patient could discharge there tomorrow morning.  CM notified MD.

## 2022-10-23 NOTE — PROGRESS NOTES
Irrigated carr catheter. Removed lots of small clots. Pt now has a slow flow. He states he does not feel any pressure or discomfort. MD made aware. Will make night nurse aware of situation.

## 2022-10-23 NOTE — PROGRESS NOTES
Prescriptions Elavil and Klonopin voided and  shredded at verbal orders from Dr. Jeet Cueto stating that the prescriptions didn't need to be signed because pt is going to rehab and could they could be trashed.

## 2022-10-23 NOTE — PROGRESS NOTES
Neurology Daily Progress Note     Assessment:     59-year-old man with inability to ambulate. On neurological examination, he has at least moderate dysphonia. Much of his symptoms may have been secondary to excessive anticholinergic effects; however, some of his symptoms could potentially fit with a paraneoplastic syndrome. He has been treated with IVIG and is improving. Plan:     The patient was on multiple anticholinergic medications. These have been reduced or discontinued. This may be contributing, at least in part, to patients balance issues. PT/OT    Intravenous immunoglobulin 2 grams/kg divided over 5 days (0.4 g/kg/day x 5 days). Pretreat with antihistamine and Tylenol 30 minutes prior to infusion. I believe that today is his last day. Recommend placement for rehabilitation    Subjective: Interval history:    Patient unchanged clinically. Tolerating IVIG. S/p LP, preliminary studies unrevealing. Labs pending. Shyam Zepeda is a 76 y.o. male who is being seen for imbalance.     Past Medical History:   Diagnosis Date    Acute deep vein thrombosis (DVT) of non-extremity vein 5/20/2019    Family history of malignant neoplasm of gastrointestinal tract     mother colon/ovarian cancer 67    Fecal incontinence     LAR syndrome    John catheter in place 2022    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    surgery and radiation    Psychiatric disorder     anxiety     Past Surgical History: Procedure Laterality Date    COLONOSCOPY  last 2/3/15    Roberto--no polyps--3 year recall    COLONOSCOPY N/A 3/5/2021    COLONOSCOPY/BMI 19 performed by Kierra Ackerman MD at [facility] ENDOSCOPY    COLONOSCOPY N/A 2/12/2018    COLONOSCOPY / BMI=21 performed by Lucie Noriega MD at [place] [address]  2/12/2018         COLONOSCOPY THRU STOMA,LESN RMVL W/SNARE  3/5/2021         COLOSTOMY  5/11/16    CT RETROPERITONEAL PERC DRAIN  5/13/2021    CT RETROPERITONEAL PERC DRAIN 5/13/2021 [facility] RADIOLOGY CT SCAN    CT RETROPERITONEAL PERC DRAIN  5/20/2021    CT RETROPERITONEAL PERC DRAIN 5/20/2021 [facility] RADIOLOGY CT SCAN    GI  4/2016    Sacral nerve stimlator lead trial (unsucessful) and removal    HERNIA REPAIR      and Colostomy in May    HERNIA REPAIR  3/2015, 5/2016    IR ABSCESS EVAL THRU EXISTING CATH  11/24/2021    IR ABSCESS EVAL THRU EXISTING CATH  6/4/2021    IR ABSCESS EVAL THRU EXISTING CATH  5/20/2021    IR NEPHROSTOGRAM EXISTING ACCESS  12/10/2021    IR NEPHROSTOGRAM EXISTING ACCESS  10/4/2021    IR NEPHROSTOMY EXCHANGE CATHETER  12/7/2021    IR NEPHROSTOMY EXCHANGE CATHETER  11/3/2021    IR NEPHROSTOMY EXCHANGE CATHETER  10/28/2021    IR NEPHROSTOMY PERCUTANEOUS LEFT  9/16/2021    IR NEPHROSTOMY PERCUTANEOUS LEFT  9/16/2021    IR NEPHROSTOMY PERCUTANEOUS LEFT 9/16/2021 [facility] SPECIALS    IR REPLCE T CVC WO PORT SAME ACC  5/30/2019    IR TUBE CHANGE  8/5/2021    IR TUBE CHANGE  7/8/2021    IR TUBE CHANGE  6/14/2021    IR TUBE CHANGE  5/27/2021    IR TUNNELED CATHETER PLACEMENT GREATER THAN 5 YEARS  5/20/2019    IR TUNNELED CATHETER PLACEMENT GREATER THAN 5 YEARS  5/20/2019    IR TUNNELED CATHETER PLACEMENT GREATER THAN 5 YEARS 5/20/2019 [facility] SPECIALS    OTHER SURGICAL HISTORY Bilateral     cataracts  2017    OTHER SURGICAL HISTORY  10/7/2013    Roberto---endorectal us    POLYPECTOMY      TOTAL COLECTOMY  11/2013    Roberto--lap LAR with coloproctostomy, mobilization splenic flexure, diverting loop ileostomy    TOTAL COLECTOMY Right 2014    for leak    VASCULAR SURGERY Left     single lumen port/subsequently removed      Family History   Problem Relation Age of Onset    Cancer Mother         colon and ovarian    Cancer Brother         prostate    Alcohol Abuse Brother     Cancer Maternal Grandmother         bone    Alcohol Abuse Father     Stroke Paternal Grandfather     Alcohol Abuse Sister      Social History     Tobacco Use    Smoking status: Former     Packs/day: 1.00     Types: Cigarettes     Quit date: 1963     Years since quittin.9    Smokeless tobacco: Never   Substance Use Topics    Alcohol use: Not Currently     Alcohol/week: 11.7 standard drinks      Current Facility-Administered Medications   Medication Dose Route Frequency Provider Last Rate Last Admin    cefepime (MAXIPIME) 2,000 mg in sodium chloride 0.9 % 50 mL IVPB mini-bag  2,000 mg IntraVENous Q24H Lizz Ramirez MD   Stopped at 10/22/22 2138    immune globulin (GAMUNEX-C) 10% solution 20 g  0.4 g/kg IntraVENous Daily Candee Romberg, APRN 66 mL/hr at 10/22/22 1445 20 g at 10/22/22 1445    acetaminophen (TYLENOL) tablet 1,000 mg  1,000 mg Oral Daily Lizz Ramirez MD   1,000 mg at 10/22/22 1344    diphenhydrAMINE (BENADRYL) capsule 25 mg  25 mg Oral Daily Lizz Ramirez MD   25 mg at 10/22/22 134    amitriptyline (ELAVIL) tablet 50 mg  50 mg Oral Nightly Gen Ciesco, DO   50 mg at 10/22/22 2138    melatonin tablet 3 mg  3 mg Oral Nightly PRN Gen Ciesco, DO   3 mg at 10/22/22 2138    carvedilol (COREG) tablet 6.25 mg  6.25 mg Oral BID WC Janice Swan MD   6.25 mg at 10/23/22 0837    clonazePAM (KLONOPIN) tablet 6 mg  6 mg Oral Nightly PRN Janice Swan MD   6 mg at 10/22/22 2138    famotidine (PEPCID) tablet 10 mg  10 mg Oral Daily Janice Swan MD   10 mg at 10/23/22 0837    levothyroxine (SYNTHROID) tablet 125 mcg  125 mcg Oral Daily Janice Swan MD   125 mcg at 10/23/22 9644 OLANZapine (ZYPREXA) tablet 7.5 mg  7.5 mg Oral Nightly Janice Swan MD   7.5 mg at 10/22/22 2138    sodium chloride flush 0.9 % injection 5-40 mL  5-40 mL IntraVENous 2 times per day Janice Swan MD   10 mL at 10/23/22 0840    sodium chloride flush 0.9 % injection 5-40 mL  5-40 mL IntraVENous PRN Janice Swan MD        0.9 % sodium chloride infusion   IntraVENous PRN Divya Dinero Ala, MD        enoxaparin (LOVENOX) injection 40 mg  40 mg SubCUTAneous Daily Janice Swan MD   40 mg at 10/23/22 0837    ondansetron (ZOFRAN-ODT) disintegrating tablet 4 mg  4 mg Oral Q8H PRN Divya Dinero Ala, MD        Or    ondansetron (ZOFRAN) injection 4 mg  4 mg IntraVENous Q6H PRN Janice Swan MD        polyethylene glycol (GLYCOLAX) packet 17 g  17 g Oral Daily PRN Divya Dinero Ala, MD        acetaminophen (TYLENOL) tablet 650 mg  650 mg Oral Q6H PRN Divya Dinero Ala, MD   650 mg at 10/20/22 2670    Or    acetaminophen (TYLENOL) suppository 650 mg  650 mg Rectal Q6H PRN Janice Swan MD        magnesium sulfate 2000 mg in 50 mL IVPB premix  2,000 mg IntraVENous PRN Divya Dinero Ala, MD        hyoscyamine (LEVSIN/SL) sublingual tablet 125 mcg  125 mcg SubLINGual Q6H PRN Janice Swan MD            No Known Allergies    Review of systems negative with exception of pertinent positives and negatives noted above.        Objective:     Vitals:    10/22/22 2000 10/23/22 0004 10/23/22 0420 10/23/22 0750   BP: 122/74 130/78 125/69 131/77   Pulse: 63 71 73 98   Resp: 18 18 18 20   Temp: 97.5 °F (36.4 °C) 97.5 °F (36.4 °C) 97.5 °F (36.4 °C) 99.1 °F (37.3 °C)   TempSrc: Axillary Oral Oral Oral   SpO2: 95% 96% 93% 95%   Weight:       Height:              Current Facility-Administered Medications:     [COMPLETED] cefepime (MAXIPIME) 2,000 mg in sodium chloride 0.9 % 50 mL IVPB mini-bag, 2,000 mg, IntraVENous, Once, Stopped at 10/21/22 5416 **FOLLOWED BY** cefepime (MAXIPIME) 2,000 mg in sodium chloride 0.9 % 50 mL IVPB mini-bag, 2,000 mg, IntraVENous, Q24H, Zulay Chapin MD, Stopped at 10/22/22 2138    immune globulin (GAMUNEX-C) 10% solution 20 g, 0.4 g/kg, IntraVENous, Daily, Kenny LittlerGERMAN, Last Rate: 66 mL/hr at 10/22/22 1445, 20 g at 10/22/22 1445    acetaminophen (TYLENOL) tablet 1,000 mg, 1,000 mg, Oral, Daily, Camilla Ceballos MD, 1,000 mg at 10/22/22 1344    diphenhydrAMINE (BENADRYL) capsule 25 mg, 25 mg, Oral, Daily, Camilla Ceballos MD, 25 mg at 10/22/22 1344    amitriptyline (ELAVIL) tablet 50 mg, 50 mg, Oral, Nightly, Gen Ciesco, DO, 50 mg at 10/22/22 2138    melatonin tablet 3 mg, 3 mg, Oral, Nightly PRN, Rosa Maria Jacome DO, 3 mg at 10/22/22 2138    carvedilol (COREG) tablet 6.25 mg, 6.25 mg, Oral, BID WC, Janice Swan MD, 6.25 mg at 10/23/22 0837    clonazePAM (KLONOPIN) tablet 6 mg, 6 mg, Oral, Nightly PRN, Unique Machuca Ala, MD, 6 mg at 10/22/22 2138    famotidine (PEPCID) tablet 10 mg, 10 mg, Oral, Daily, Unique Machuca Ala, MD, 10 mg at 10/23/22 6918    levothyroxine (SYNTHROID) tablet 125 mcg, 125 mcg, Oral, Daily, Unique Machuca Ala, MD, 125 mcg at 10/23/22 0621    OLANZapine (ZYPREXA) tablet 7.5 mg, 7.5 mg, Oral, Nightly, Janice Swan MD, 7.5 mg at 10/22/22 2138    sodium chloride flush 0.9 % injection 5-40 mL, 5-40 mL, IntraVENous, 2 times per day, Janice Swan MD, 10 mL at 10/23/22 0840    sodium chloride flush 0.9 % injection 5-40 mL, 5-40 mL, IntraVENous, PRN, Unique Machuca Ala, MD    0.9 % sodium chloride infusion, , IntraVENous, PRN, Unique Machuca Ala, MD    enoxaparin (LOVENOX) injection 40 mg, 40 mg, SubCUTAneous, Daily, Unique Machuca Ala, MD, 40 mg at 10/23/22 0837    ondansetron (ZOFRAN-ODT) disintegrating tablet 4 mg, 4 mg, Oral, Q8H PRN **OR** ondansetron (ZOFRAN) injection 4 mg, 4 mg, IntraVENous, Q6H PRN, Unique Machuca Ala, MD    polyethylene glycol (GLYCOLAX) packet 17 g, 17 g, Oral, Daily PRN, Unique Machuca Ala, MD    acetaminophen (TYLENOL) tablet 650 mg, 650 mg, Oral, Q6H PRN, 650 mg at 10/20/22 0936 **OR** acetaminophen (TYLENOL) suppository 650 mg, 650 mg, Rectal, Q6H PRN, Janice Swan MD    magnesium sulfate 2000 mg in 50 mL IVPB premix, 2,000 mg, IntraVENous, PRN, Jolee Klinefelter Ala, MD    hyoscyamine (LEVSIN/SL) sublingual tablet 125 mcg, 125 mcg, SubLINGual, Q6H PRN, Jolee Klinefelter Ala, MD    No results found for this or any previous visit (from the past 12 hour(s)). Physical Exam:  General -: Borderline cachectic. Pleasant and conversant. Dysphonic  HEENT - Normocephalic, atraumatic. Conjunctiva, tympanic membranes, and oropharynx are clear. Neck - Supple without masses, no bruits   Extremities - Peripheral pulses intact. No edema and no rashes. Neurological examination - Comprehension, attention , memory and reasoning are intact. Language is normal.  Speech is at least moderately dysphonic. On cranial nerve examination pupils are equal round and reactive to light. Fundoscopic examination is normal. Visual acuity is adequate. Visual fields are full to finger confrontation. Extraocular motility is normal. Face is symmetric and sensation is intact to light touch. Hearing is intact to finger rustle bilaterally. Motor examination - There is normal muscle tone and bulk. Power is full throughout with exception to 4-/5 strength of the iliopsoas and 4 -/5 strength of the quadriceps which is chronic. Muscle stretch reflexes are absent at the patella and Achilles, bilaterally. Sensation is intact to light touch, pinprick, vibration and proprioception in all extremities. He may have some mild proprioceptive deficits in the right lower limb.  Cerebellar examination is normal.      Signed By: Jackie Zhang DO     October 23, 2022

## 2022-10-23 NOTE — PROGRESS NOTES
Pt's carr catheter content has been flowing yellow cloudy urine this shift. At this time, pt's carr's urine content is bloody with blood clots present. Perfect served Dr. Christiano Covington to make aware. Will await any new orders. VSS, pt is alert/oriented x4 and watching tv.

## 2022-10-23 NOTE — DISCHARGE SUMMARY
Hospitalist Discharge Summary   Admit Date:  10/16/2022  8:25 AM   DC Note date: 10/24/2022  Name:  Radha Porter   Age:  76 y.o. Sex:  male  :  1945   MRN:  [id number]   Room:  [facility]  PCP:  Ara Ambrocio DO    Presenting Complaint: Fatigue     Initial Admission Diagnosis: Dehydration [E86.0]  Exertional dyspnea [R06.09]  Leg weakness, bilateral [R29.898]  Unable to ambulate [R26.2]  Decreased activities of daily living (ADL) [Z78.9]     Problem List for this Hospitalization (present on admission):    Principal Problem:    Leg weakness, bilateral  Active Problems:    Rectal cancer (HCC)    Decreased activities of daily living (ADL)    Hypothyroidism    Urinary tract infection associated with indwelling urethral catheter ([facility] [address]  Resolved Problems:    Unable to ambulate      Hospital Course:  Radha Porter is a 76 y.o. 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 Patient states that he feels so weak that his legs gave out and is having balance issues with walking. Patient denies any fever chills chest pain cough shortness of breath. No palpitations. No nausea no vomiting no diarrhea. No abdominal pain. He denies any falls. Patient was afebrile, hemodynamically stable. ER physician reported that the patient's oxygen saturation dropped to 88% on room air and was placed on nasal cannula. CT head on admission negative. Hospital Course: admitted for sudden onset lower leg weakness in setting of known rectal cancer in the past. MRI spine negative for etiology.   MRI brain with \"process going on in the posterior temporal region on the left the exact nature of which is unclear\" per neurology. He had an LP as well and prelim studies on this unrevealing. He was started on IVIG and seemed to improve. He ambulated [de-identified]' without assistance and holding his carr bag. He was thinking about going home but he lives alone and I would like him monitored a few days at SNF because his last dose of IVIG is today and want to make sure he stays stable after it is stopped. Discussed with [facility] DR hughes and the earliest they might have a bed is Wednesday. He has a bed at SNF today and I think he will do just as well there; he is not as medically complex as the typical [facility] pt. He is agreeable to SNF    Neurology follow up order placed also. Found to have UTI also. His chronic carr was changed and cefepime started. Last dose this afternoon. Omnicef at SNF for 5 more days      Disposition: SNF  Diet: ADULT DIET; Regular; Low Fat/Low Chol/High Fiber/2 gm Na  Code Status: Full Code    Follow Ups:   Contact information for follow-up providers     [facility] Wright Follow up. Specialty: Neurology  Why: neurology follow up. they should call you for appointment  Contact information:  [address] [place] [address]  [phone]                   Contact information for after-discharge care     Discharge [address] [place] [address]) . Service: Skilled Nursing  Contact information:  [address] [address] [phone]                           Time spent in patient discharge and coordination 35 minutes. Follow up labs/diagnostics (ultimately defer to outpatient provider):  CBC, BMP    Plan was discussed with pt. All questions answered. Patient was stable at time of discharge. Instructions given to call a physician or return if any concerns.     Current Discharge Medication List        START taking these medications    Details   cefdinir (OMNICEF) 300 MG capsule Take 1 capsule by mouth 2 times daily for 5 days  Qty: 10 capsule, Refills: 0           CONTINUE these medications which have CHANGED    Details   amitriptyline (ELAVIL) 50 MG tablet Take 1 tablet by mouth nightly  Qty: 30 tablet, Refills: 1      clonazePAM (KLONOPIN) 2 MG tablet Take 2 tablets by mouth nightly as needed for Anxiety for up to 3 days. Qty: 6 tablet, Refills: 0    Associated Diagnoses: Anxiety           CONTINUE these medications which have NOT CHANGED    Details   carvedilol (COREG) 6.25 MG tablet Take 1 tablet by mouth 2 times daily (with meals)  Qty: 180 tablet, Refills: 3      acetaminophen (TYLENOL) 500 MG tablet Take 500 mg by mouth every 6 hours as needed for Pain      ibuprofen (ADVIL;MOTRIN) 600 MG tablet Take 600 mg by mouth every 6 hours as needed for Pain      levothyroxine (SYNTHROID) 125 MCG tablet TAKE ONE TABLET BY MOUTH ONE TIME DAILY BEFORE BREAKFAST      OLANZapine (ZYPREXA) 2.5 MG tablet Take 7.5 mg by mouth nightly           STOP taking these medications       Hyoscyamine Sulfate SL (LEVSIN/SL) 0.125 MG SUBL Comments:   Reason for Stopping:         oxybutynin (DITROPAN-XL) 10 MG extended release tablet Comments:   Reason for Stopping:         famotidine (PEPCID) 10 MG tablet Comments:   Reason for Stopping:               Procedures done this admission:  * No surgery found *    Consults this admission:  IP CONSULT TO NEUROLOGY  IP CONSULT TO PHARMACY    Echocardiogram results:  No results found for this or any previous visit. Diagnostic Imaging/Tests:   XR CHEST (2 VW)    Result Date: 10/16/2022  No acute cardiopulmonary process. CT HEAD WO CONTRAST    Result Date: 10/16/2022  No acute findings     MRI CERVICAL SPINE W WO CONTRAST    Result Date: 10/19/2022  No visualized etiology for the patient's symptoms. No evidence of metastasis to the cervical spine. MRI THORACIC SPINE W WO CONTRAST    Result Date: 10/19/2022  No evidence of metastasis to the thoracic spine.  No visualized etiology for the patient's symptoms. MRI LUMBAR SPINE W WO CONTRAST    Result Date: 10/19/2022  1. No evidence of metastasis to the lumbar spine. 2.  No acute abnormality. 3.  Multilevel spondylosis and facet arthrosis. IR LUMBAR PUNCTURE FOR DIAGNOSIS    Result Date: 27/24/9389  Uncomplicated lumbar puncture. Plan: The patient will recover at bedrest.     MRI BRAIN W WO CONTRAST    Result Date: 10/19/2022  Mild ill-defined nonenhancing focus of cortical/subcortical signal abnormality at the left parieto-occipital junction, indeterminate but may represent evolving subacute infarct or postictal changes among other etiologies. Recommend short-term follow-up MRI in approximately 6-12 weeks to assess for evolution/resolution. No evidence of acute ischemic infarction, intracranial hemorrhage, mass or midline shift. Mild to moderate chronic white matter microvascular ischemic changes. Labs: Results:       BMP, Mg, Phos No results for input(s): NA, K, CL, CO2, ANIONGAP, BUN, CREATININE, LABGLOM, GFRAA, CALCIUM, GLUCOSE, MG, PHOS in the last 72 hours. CBC No results for input(s): WBC, RBC, HGB, HCT, MCV, MCH, MCHC, RDW, PLT, MPV, NRBC, SEGS, LYMPHOPCT, EOSRELPCT, MONOPCT, BASOPCT, IMMGRAN, SEGSABS, LYMPHSABS, EOSABS, MONOSABS, BASOSABS, ABSIMMGRAN in the last 72 hours. LFT No results for input(s): BILITOT, BILIDIR, ALKPHOS, AST, ALT, PROT, LABALBU, GLOB in the last 72 hours.    Cardiac  No results found for: NTPROBNP, TROPHS   Coags Lab Results   Component Value Date/Time    PROTIME 13.7 09/15/2021 07:45 PM    PROTIME 16.3 08/27/2021 04:30 PM    PROTIME 15.5 08/27/2021 01:30 PM    INR 1.0 09/15/2021 07:45 PM    INR 1.3 08/27/2021 04:30 PM    INR 1.2 08/27/2021 01:30 PM    APTT 28.1 08/27/2021 04:30 PM    APTT 32.5 08/27/2021 01:30 PM    APTT 62.9 08/27/2021 11:56 AM      A1c Lab Results   Component Value Date/Time    LABA1C 5.2 08/03/2021 11:14 AM     08/03/2021 11:14 AM      Lipids Lab Results   Component Value Date/Time    CHOL 205 12/03/2021 08:09 AM    LDLCALC 110 12/03/2021 08:09 AM    LABVLDL 18 12/05/2019 08:30 AM    HDL 77 12/03/2021 08:09 AM    TRIG 103 12/03/2021 08:09 AM      Thyroid  Lab Results   Component Value Date/Time    TSHELE 0.72 10/16/2022 08:16 AM        Most Recent UA Lab Results   Component Value Date/Time    COLORU YELLOW/STRAW 10/21/2022 10:26 AM    APPEARANCE TURBID 10/21/2022 10:26 AM    SPECGRAV 1.015 10/21/2022 10:26 AM    LABPH 6.5 10/21/2022 10:26 AM    PROTEINU 300 10/21/2022 10:26 AM    GLUCOSEU Negative 10/21/2022 10:26 AM    KETUA Negative 10/21/2022 10:26 AM    BILIRUBINUR Negative 10/21/2022 10:26 AM    BILIRUBINUR Negative 05/10/2022 12:32 PM    BLOODU LARGE 10/21/2022 10:26 AM    UROBILINOGEN 0.2 10/21/2022 10:26 AM    NITRU Positive 10/21/2022 10:26 AM    LEUKOCYTESUR LARGE 10/21/2022 10:26 AM    WBCUA >100 10/21/2022 10:26 AM    RBCUA 20-50 10/21/2022 10:26 AM    EPITHUA 0 10/21/2022 10:26 AM    BACTERIA 4+ 10/21/2022 10:26 AM    LABCAST 0 10/21/2022 10:26 AM    MUCUS 0 10/21/2022 10:26 AM        Recent Labs     10/21/22  1026 10/20/22  0821   CULTURE >100,000 COLONIES/mL MIXED SKIN SHERRY ISOLATED  THREE OR MORE TYPES OF ORGANISMS ARE PRESENT. THIS IS INDICATIVE OF CONTAMINATION DUE TO IMPROPER COLLECTION TECHNIQUE. PLEASE REPEAT COLLECTION UNLESS PATIENT HAS STARTED ANTIBIOTIC TREATMENT. NO GROWTH 4 DAYS       All Labs from Last 24 Hrs:  No results found for this or any previous visit (from the past 24 hour(s)).     No Known Allergies  Immunization History   Administered Date(s) Administered    COVID-19, PFIZER PURPLE top, DILUTE for use, (age 15 y+), 30mcg/0.3mL 02/15/2021, 03/08/2021, 10/25/2021    PPD Test 01/28/2014, 08/24/2014, 09/09/2014, 11/19/2015, 05/23/2016, 03/25/2019, 04/23/2019, 09/24/2021, 09/27/2021, 02/24/2022, 06/27/2022, 10/17/2022       Recent Vital Data:  Patient Vitals for the past 24 hrs:   Temp Pulse Resp BP SpO2   10/24/22 0738 98.2 °F (36.8 °C) 63 18 139/77 97 %   10/24/22 0330 97.4 °F (36.3 °C) 61 17 123/72 95 %   10/23/22 2305 98 °F (36.7 °C) 63 18 133/74 95 %   10/23/22 1945 97.3 °F (36.3 °C) 61 18 (!) 154/78 98 %   10/23/22 1821 -- -- -- 117/65 --   10/23/22 1639 97.9 °F (36.6 °C) 60 16 126/72 98 %   10/23/22 1214 97.9 °F (36.6 °C) 81 16 115/77 98 %       Oxygen Therapy  SpO2: 97 %  Pulse via Oximetry: 74 beats per minute  Pulse Oximeter Device Mode: Continuous  O2 Device: None (Room air)    Estimated body mass index is 18.43 kg/m² as calculated from the following:    Height as of this encounter: 5' 8.5\" (1.74 m). Weight as of this encounter: 123 lb (55.8 kg). Intake/Output Summary (Last 24 hours) at 10/24/2022 4359  Last data filed at 10/24/2022 0336  Gross per 24 hour   Intake --   Output 2450 ml   Net -2450 ml         Physical Exam:    General:    Well nourished. No overt distress  Head:  Normocephalic, atraumatic  Eyes:  Sclerae appear normal.  Pupils equally round. HENT:  Nares appear normal, no drainage. Moist mucous membranes  Neck:  No restricted ROM. Trachea midline  CV:   RRR. No JVD  Lungs:   Respirations even, unlabored  Abdomen:   nondistended. Extremities: Warm and dry. No cyanosis or clubbing. No edema. Skin:     No rashes. Normal coloration  Neuro:  CN II-XII grossly intact. Psych:  Normal mood and affect. Signed:  Juan Jose Magana MD    Part of this note may have been written by using a voice dictation software. The note has been proof read but may still contain some grammatical/other typographical errors.

## 2022-10-24 VITALS
OXYGEN SATURATION: 97 % | RESPIRATION RATE: 18 BRPM | BODY MASS INDEX: 18.22 KG/M2 | HEIGHT: 69 IN | WEIGHT: 123 LBS | HEART RATE: 63 BPM | DIASTOLIC BLOOD PRESSURE: 77 MMHG | TEMPERATURE: 98.2 F | SYSTOLIC BLOOD PRESSURE: 139 MMHG

## 2022-10-24 LAB
SARS-COV-2 RDRP RESP QL NAA+PROBE: NOT DETECTED
SOURCE: NORMAL

## 2022-10-24 PROCEDURE — 87635 SARS-COV-2 COVID-19 AMP PRB: CPT

## 2022-10-24 PROCEDURE — 2580000003 HC RX 258: Performed by: HOSPITALIST

## 2022-10-24 PROCEDURE — 6370000000 HC RX 637 (ALT 250 FOR IP): Performed by: INTERNAL MEDICINE

## 2022-10-24 PROCEDURE — 51702 INSERT TEMP BLADDER CATH: CPT

## 2022-10-24 PROCEDURE — 6370000000 HC RX 637 (ALT 250 FOR IP): Performed by: HOSPITALIST

## 2022-10-24 PROCEDURE — 6360000002 HC RX W HCPCS: Performed by: HOSPITALIST

## 2022-10-24 RX ORDER — AMITRIPTYLINE HYDROCHLORIDE 10 MG/1
25 TABLET, FILM COATED ORAL NIGHTLY
Status: DISCONTINUED | OUTPATIENT
Start: 2022-10-24 | End: 2022-10-24 | Stop reason: HOSPADM

## 2022-10-24 RX ADMIN — CARVEDILOL 6.25 MG: 6.25 TABLET, FILM COATED ORAL at 08:09

## 2022-10-24 RX ADMIN — ENOXAPARIN SODIUM 40 MG: 100 INJECTION SUBCUTANEOUS at 08:09

## 2022-10-24 RX ADMIN — SODIUM CHLORIDE, PRESERVATIVE FREE 10 ML: 5 INJECTION INTRAVENOUS at 08:10

## 2022-10-24 RX ADMIN — FAMOTIDINE 10 MG: 20 TABLET, FILM COATED ORAL at 08:09

## 2022-10-24 RX ADMIN — LEVOTHYROXINE SODIUM 125 MCG: 0.12 TABLET ORAL at 05:30

## 2022-10-24 RX ADMIN — CEFDINIR 300 MG: 300 CAPSULE ORAL at 08:09

## 2022-10-24 ASSESSMENT — PAIN SCALES - GENERAL: PAINLEVEL_OUTOF10: 0

## 2022-10-24 NOTE — CARE COORDINATION
Patient is medically stable for discharge. Per Ignacio Allan from [address] [place] [address]), patient can come today. Patient will be going to Room 423. Report [phone]. CM did speak with patient this a.m who is still agreeable to plan for STR. CM called [facility] and scheduled transport for 12:30. Packet completed and given to . Patient, RN,  and liaison, [facility]) aware of d/c time. No other needs have been identified at this time.     ASSESSMENT NOTE    Attending Physician: Juan Jose Magana MD  Admit Problem: Dehydration [E86.0]  Exertional dyspnea [R06.09]  Leg weakness, bilateral [R29.898]  Unable to ambulate [R26.2]  Decreased activities of daily living (ADL) [Z78.9]  Date/Time of Admission: 10/16/2022  8:25 AM  Problem List:  Patient Active Problem List   Diagnosis    Fecal incontinence    Dilated intrahepatic bile duct    SBO (small bowel obstruction) (HCC)    Hypoxemia    Dehydration    Weight loss    Rectal cancer ([address]    Bladder injury with open wound into cavity    Hypercalcemia of malignancy    Severe sepsis with acute organ dysfunction (HCC)    Attention to ileostomy (HCC)    Acute blood loss anemia    Anemia    Abscess    Hypernatremia    Decreased activities of daily living (ADL)    DVT (deep vein thrombosis) in pregnancy    Enterocutaneous fistula    Hypothyroidism    Small bowel obstruction (HCC)    Ileus (HCC)    Abdominal wall abscess    Pleural effusion    H/O urinary retention    Severe protein-calorie malnutrition (HCC)    Elevated LFTs    Urinary retention    Hydronephrosis of right kidney    C. difficile colitis    Anxiety    Pancreatic duct dilated    Hypomagnesemia    Rectal fistula    Hematuria    Major depression    Colostomy care ([address]    Chronic indwelling John catheter    Acute deep vein thrombosis (DVT) of non-extremity vein    Hypokalemia    Postoperative ileus (HCC)    Bowel incontinence    Constipation    Urinary tract infection associated with indwelling urethral catheter ([facility] [address]    Acute kidney injury superimposed on CKD ([facility]    Pelvic abscess in male [facility])    Infected prosthetic mesh of abdominal wall (HCC)    Fever    Acute UTI (urinary tract infection)    Pseudopolyposis of colon without complication, unspecified part of colon ([facility]    Sepsis with acute organ dysfunction (HCC)    Mild episode of recurrent major depressive disorder ([facility]    Gram-negative bacteremia    Bacteremia due to Gram-positive bacteria    Acute urinary retention    Leg weakness, bilateral       Service Assessment  Patient Orientation Alert and Oriented   Cognition Alert   History Provided By Patient   Primary Caregiver Self   Accompanied By/Relationship     Support Systems Friends/Neighbors   Patient's Healthcare Decision Maker is:     PCP Verified by CM Yes   Last Visit to PCP     Prior Functional Level Independent in ADLs/IADLs   Current Functional Level Independent in ADLs/IADLs   Can patient return to prior living arrangement     Ability to make needs known: Good   Family able to assist with home care needs:     Would you like for me to discuss the discharge plan with any other family members/significant others, and if so, who?      Financial Resources     Community Resources     CM/SW Referral       Social/Functional History  Lives With Alone   Type of Home House   Home Layout Two level   Home Access     Entrance Stairs - Number of Steps     Entrance Stairs - Rails     Bathroom Shower/Tub Tub/Shower unit   Bathroom Toilet     Bathroom Equipment     Bathroom Accessibility     Home Equipment     Receives Help From     ADL Assistance Independent   Bath     Dressing     Grooming     Feeding     Toileting     Homemaking Assistance Independent   Meal Prep     Laundry     Vacuuming     Cleaning     Gardening     Yard Work     Driving     Shopping          Other (Comment)     Homemaking Responsibilities     Meal Prep Responsibility     Laundry Responsibility     Cleaning Responsibility Bill Paying/Finance Responsibility     Shopping Responsibility     [address] [employer]     Other (Comment)     Ambulation Assistance     Transfer Assistance     Active  Yes   Patient's  Info     Mode of Transportation     Education     Occupation     Type of Occupation       Discharge Planning   Type of [facility] Prior To Admission None   Potential Assistance Needed N/A   DME     DME     DME Ordered? No   Potential Assistance Purchasing Medications     Meds-to-Beds: Does the patient want to have any new prescriptions delivered to bedside prior to discharge? Type of Home Care Services None   Patient expects to be discharged to: Skilled nursing facility   Follow Up Appointment: Best Day/Time     One/Two Story Residence: Two story   # of Interior Steps     Height of Each Step (in)     [employer] Available     History of Falls? Services At/After Discharge  Transition of Care Consult (CM Consult): SNF   Internal Home Health     Internal Hospice     Reason Outside Agency [address]     Partner SNF     Reason Why Partner SNF Not Chosen     Internal Comfort Care     Reason Outside [address] Discharge [facility] (SNF)   [facility] Resource Information Provided? Mode of Transport at [facility] Time of Discharge     Confirm Follow Up Transport       Condition of Participation: Discharge Planning  The plan for Transition of Care is related to the following treatment goals: The Patient and/or Patient Representative was provided with a Choice of Provider? Name of the Patient Representative who was provided with the Choice of Provider and agrees with the Discharge Plan? The Patient and/or Patient Representative Agree with the Discharge Plan?      Freedom of Choice list was provided with basic dialogue that supports the individualized plan of care/goals, treatment preferences, and shares the quality data associated with the providers?        Documentation for Discharge Appeal  Discharge Appealed by     Date notified by [facility] of appeal request:     Time notified by [facility] of appeal request:     Detailed Notice of Discharge given to:     Date Notice of Discharge given:     Time Notice of Discharge given:     Date records sent to [facility]     Time records sent to Yo Aparicio     Date Notified of Outcome     Time Notified of Outcome     Outcome of appeal           [address] 10/24/22 11:56 AM

## 2022-10-24 NOTE — PROGRESS NOTES
GOOD VISIT WITH PATIENT      CALM    VERY RECEPTIVE TO VISIT    PT SHARED HIS JOURNEY    APPEARS TO BE COPING WELL    CONCERNS :  TO REMAIN MOBILE AND GETTING ADEQUATE NUTRITION      PRAYER      PT IS COPING WELL    APPRECIATIVE OF STAFF      WU STRONG

## 2022-10-24 NOTE — PROGRESS NOTES
Hourly rounds completed this shift. All needs met at this time. Bed low/locked. Call light within reach. Will continue to monitor and give bedside report to oncoming nurse. Pt urine now back to yellow.

## 2022-10-24 NOTE — PROGRESS NOTES
Hospitalist Progress Note   Admit Date:  10/16/2022  8:25 AM   Name:  Rachel Nolasco   Age:  76 y.o. Sex:  male  :  1945   MRN:  [id number]   Room:  [facility]    Presenting Complaint: Fatigue     Reason(s) for Admission: Dehydration [E86.0]  Exertional dyspnea [R06.09]  Leg weakness, bilateral [R29.898]  Unable to ambulate [R26.2]  Decreased activities of daily living (ADL) [Z78.9]     Hospital Course:     Mikey Hudson is a 76 y.o. 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 Patient states that he feels so weak that his legs gave out and is having balance issues with walking. Patient denies any fever chills chest pain cough shortness of breath. No palpitations. No nausea no vomiting no diarrhea. No abdominal pain. He denies any falls. Patient was afebrile, hemodynamically stable. ER physician reported that the patient's oxygen saturation dropped to 88% on room air and was placed on nasal cannula. CT head on admission negative. Hospital Course: admitted for sudden onset lower leg weakness in setting of known rectal cancer in the past. MRI spine negative for etiology. MRI brain with \"process going on in the posterior temporal region on the left the exact nature of which is unclear\" per neurology. Subjective & 24hr Events (10/24/22): Pt ambulating in halls unassisted. No complaints. Feeling better      Assessment & Plan:       Leg weakness, bilateral  -last dose of IVIG today. To rehab after that.   No other medical reason to keep in hospital.  He is ambulating much better.  -cont PT/OT  - decreased Elavil to 50 mg, plan to wean off eventually  - discontinued oxybutynin       UTI  -switch abx to omnicef in anticipation of discharge      Rectal cancer ([facility] [address]  - oupatient follow-up    # Hypothyroidism  - Synthroid 125 mcg      Diet:  ADULT DIET; Regular; Low Fat/Low Chol/High Fiber/2 gm Na  DVT PPx: Lovenox   Code status: Full Code    Hospital Problems:  Principal Problem:    Leg weakness, bilateral  Active Problems:    Rectal cancer (HCC)    Decreased activities of daily living (ADL)    Hypothyroidism    Urinary tract infection associated with indwelling urethral catheter (HCC)  Resolved Problems:    Unable to ambulate      Objective:   Patient Vitals for the past 24 hrs:   Temp Pulse Resp BP SpO2   10/24/22 0738 98.2 °F (36.8 °C) 63 18 139/77 97 %   10/24/22 0330 97.4 °F (36.3 °C) 61 17 123/72 95 %   10/23/22 2305 98 °F (36.7 °C) 63 18 133/74 95 %   10/23/22 1945 97.3 °F (36.3 °C) 61 18 (!) 154/78 98 %   10/23/22 1821 -- -- -- 117/65 --   10/23/22 1639 97.9 °F (36.6 °C) 60 16 126/72 98 %   10/23/22 1214 97.9 °F (36.6 °C) 81 16 115/77 98 %         Oxygen Therapy  SpO2: 97 %  Pulse via Oximetry: 74 beats per minute  Pulse Oximeter Device Mode: Continuous  O2 Device: None (Room air)    Estimated body mass index is 18.43 kg/m² as calculated from the following:    Height as of this encounter: 5' 8.5\" (1.74 m). Weight as of this encounter: 123 lb (55.8 kg). Intake/Output Summary (Last 24 hours) at 10/24/2022 0823  Last data filed at 10/24/2022 0336  Gross per 24 hour   Intake --   Output 2450 ml   Net -2450 ml           Physical Exam:     Blood pressure 139/77, pulse 63, temperature 98.2 °F (36.8 °C), temperature source Oral, resp. rate 18, height 5' 8.5\" (1.74 m), weight 123 lb (55.8 kg), SpO2 97 %. General:    Well nourished. Head:  Normocephalic, atraumatic  Eyes:  Sclerae appear normal.  Pupils equally round. ENT:  Nares appear normal, no drainage. Moist oral mucosa  Neck:  No restricted ROM. Trachea midline   CV:   RRR. No jugular venous distension.   Lungs: No distress. Symmetric expansion. Abdomen:   nondistended. Extremities: No cyanosis or clubbing. No edema  Skin:     No rashes and normal coloration. Warm and dry. Neuro:  CN II-XII grossly intact. Sensation intact. Gait/balance assessment deferred. Psych:  Normal mood and affect. I have personally reviewed labs and tests showing:  Recent Labs:  No results found for this or any previous visit (from the past 48 hour(s)). I have personally reviewed imaging studies showing: Other Studies:  IR LUMBAR PUNCTURE FOR DIAGNOSIS   Final Result   Uncomplicated lumbar puncture. Plan: The patient will recover at bedrest.            MRI THORACIC SPINE W WO CONTRAST   Final Result   No evidence of metastasis to the thoracic spine. No visualized etiology for the   patient's symptoms. MRI CERVICAL SPINE W WO CONTRAST   Final Result   No visualized etiology for the patient's symptoms. No evidence of metastasis to   the cervical spine. MRI LUMBAR SPINE W WO CONTRAST   Final Result      1. No evidence of metastasis to the lumbar spine. 2.  No acute abnormality. 3.  Multilevel spondylosis and facet arthrosis. MRI BRAIN W WO CONTRAST   Final Result      Mild ill-defined nonenhancing focus of cortical/subcortical signal abnormality   at the left parieto-occipital junction, indeterminate but may represent evolving   subacute infarct or postictal changes among other etiologies. Recommend   short-term follow-up MRI in approximately 6-12 weeks to assess for   evolution/resolution. No evidence of acute ischemic infarction, intracranial hemorrhage, mass or   midline shift. Mild to moderate chronic white matter microvascular ischemic changes. CT HEAD WO CONTRAST   Final Result   No acute findings         XR CHEST (2 VW)   Final Result   No acute cardiopulmonary process.           Current Meds:  Current Facility-Administered Medications   Medication Dose Route Frequency amitriptyline (ELAVIL) tablet 25 mg  25 mg Oral Nightly    cefdinir (OMNICEF) capsule 300 mg  300 mg Oral 2 times per day    melatonin tablet 3 mg  3 mg Oral Nightly PRN    carvedilol (COREG) tablet 6.25 mg  6.25 mg Oral BID WC    clonazePAM (KLONOPIN) tablet 6 mg  6 mg Oral Nightly PRN    famotidine (PEPCID) tablet 10 mg  10 mg Oral Daily    levothyroxine (SYNTHROID) tablet 125 mcg  125 mcg Oral Daily    OLANZapine (ZYPREXA) tablet 7.5 mg  7.5 mg Oral Nightly    sodium chloride flush 0.9 % injection 5-40 mL  5-40 mL IntraVENous 2 times per day    sodium chloride flush 0.9 % injection 5-40 mL  5-40 mL IntraVENous PRN    0.9 % sodium chloride infusion   IntraVENous PRN    enoxaparin (LOVENOX) injection 40 mg  40 mg SubCUTAneous Daily    ondansetron (ZOFRAN-ODT) disintegrating tablet 4 mg  4 mg Oral Q8H PRN    Or    ondansetron (ZOFRAN) injection 4 mg  4 mg IntraVENous Q6H PRN    polyethylene glycol (GLYCOLAX) packet 17 g  17 g Oral Daily PRN    acetaminophen (TYLENOL) tablet 650 mg  650 mg Oral Q6H PRN    Or    acetaminophen (TYLENOL) suppository 650 mg  650 mg Rectal Q6H PRN    magnesium sulfate 2000 mg in 50 mL IVPB premix  2,000 mg IntraVENous PRN    hyoscyamine (LEVSIN/SL) sublingual tablet 125 mcg  125 mcg SubLINGual Q6H PRN       Signed:  Rashid Spann MD    Part of this note may have been written by using a voice dictation software. The note has been proof read but may still contain some grammatical/other typographical errors.

## 2022-10-24 NOTE — PROGRESS NOTES
TRANSFER - OUT REPORT:    Verbal report given to April on Rebecca Queen  being transferred to [facility] for routine progression of patient care       Report consisted of patient's Situation, Background, Assessment and   Recommendations(SBAR). Information from the following report(s) Nurse Handoff Report was reviewed with the receiving nurse. Lines:   Peripheral IV 10/20/22 Left Arm (Active)   Site Assessment Clean, dry & intact 10/24/22 0713   Line Status Capped;Flushed;Normal saline locked 10/24/22 0713   Line Care Connections checked and tightened 10/24/22 0713   Phlebitis Assessment No symptoms 10/24/22 0713   Infiltration Assessment 0 10/24/22 0713   Alcohol Cap Used Yes 10/24/22 0713   Dressing Status Clean, dry & intact 10/24/22 0713   Dressing Type Transparent 10/24/22 0713   Dressing Intervention Other (Comment) 10/23/22 0630        Opportunity for questions and clarification was provided.       Patient transported with:  O2 @ 0lpm

## 2022-10-25 ENCOUNTER — HOSPITAL ENCOUNTER (OUTPATIENT)
Dept: PHYSICAL THERAPY | Age: 77
Setting detail: RECURRING SERIES
End: 2022-10-25
Payer: MEDICARE

## 2022-10-25 ENCOUNTER — CARE COORDINATION (OUTPATIENT)
Dept: CARE COORDINATION | Facility: CLINIC | Age: 77
End: 2022-10-25

## 2022-10-25 ENCOUNTER — TELEPHONE (OUTPATIENT)
Dept: UROLOGY | Age: 77
End: 2022-10-25

## 2022-10-25 LAB
BACTERIA SPEC CULT: NORMAL
GRAM STN SPEC: NORMAL
GRAM STN SPEC: NORMAL
SERVICE CMNT-IMP: NORMAL

## 2022-10-25 NOTE — PROGRESS NOTES
Melvin Jimkeerthi Constance  : 1945  Primary: Medicare Part A And B  Secondary: [address] [place]  [address] [facility] [address]  Phone: [phone]  Fax: [phone]    PT Visit Info:    No data recorded   OT Visit Info:  No data recorded    OUTPATIENT THERAPY:OP NOTE TYPE: Progress Report 10/25/2022               Episode  Appt Desk           [address] cancelled his appointment for today due to  hospitalization . Will plan to follow up next during next appointment.   Thank you,  Yovany Cuevas, PT    Future Appointments   Date Time Provider Dot Gusman   10/27/2022  8:00 AM Vania Mcdonald, PT [facility]   2022  8:45 AM Yovany Cuevas, PT [facility]   11/3/2022  8:45 AM Yovany Cuevas, PT [facility]   2022  9:15 AM [id number] INJECTION/CATH [id number] [facility]   2022  8:45 AM Yovany Cuevas, PT [facility]   11/10/2022  8:45 AM Yovany Cuevas, PT [facility]   11/15/2022  8:45 AM Yovany Cuevas, PT [facility]   2022  8:45 AM Yovany Cuevas, PT [facility]   2022  8:45 AM Yovany Cuevas, PT [facility]   2022  8:45 AM Yovany Cuevas, PT [facility]   2022  8:15 AM TRIM LAB RESOURCE TRIM [facility] AMB   2022  8:30 AM Tayler Ott DO TRIM [facility] AMB

## 2022-10-25 NOTE — CARE COORDINATION
Transition of care outreach postponed for 14 days due to patient's discharge to SNF.  Patient D/C to [facility]

## 2022-10-25 NOTE — TELEPHONE ENCOUNTER
Pt called wanting to schedule stent exchange. Pt is now admitted to a rehab facility currently. Best number to get in touch with patient is [phone] to schedule surgery.

## 2022-10-28 LAB
Lab: NORMAL
REFERENCE LAB: NORMAL
REFERENCE LAB: NORMAL

## 2022-11-01 ENCOUNTER — APPOINTMENT (OUTPATIENT)
Dept: PHYSICAL THERAPY | Age: 77
End: 2022-11-01
Payer: MEDICARE

## 2022-11-03 ENCOUNTER — HOSPITAL ENCOUNTER (EMERGENCY)
Age: 77
Discharge: HOME | End: 2022-11-04
Attending: EMERGENCY MEDICINE | Admitting: EMERGENCY MEDICINE
Payer: MEDICARE

## 2022-11-03 ENCOUNTER — APPOINTMENT (OUTPATIENT)
Dept: PHYSICAL THERAPY | Age: 77
End: 2022-11-03
Payer: MEDICARE

## 2022-11-03 DIAGNOSIS — T83.511A URINARY TRACT INFECTION ASSOCIATED WITH CATHETERIZATION OF URINARY TRACT, UNSPECIFIED INDWELLING URINARY CATHETER TYPE, INITIAL ENCOUNTER (HCC): ICD-10-CM

## 2022-11-03 DIAGNOSIS — N39.0 URINARY TRACT INFECTION ASSOCIATED WITH CATHETERIZATION OF URINARY TRACT, UNSPECIFIED INDWELLING URINARY CATHETER TYPE, INITIAL ENCOUNTER (HCC): ICD-10-CM

## 2022-11-03 DIAGNOSIS — T83.9XXA PROBLEM WITH FOLEY CATHETER, INITIAL ENCOUNTER (HCC): Primary | ICD-10-CM

## 2022-11-03 PROCEDURE — 51702 INSERT TEMP BLADDER CATH: CPT | Performed by: EMERGENCY MEDICINE

## 2022-11-03 PROCEDURE — 99284 EMERGENCY DEPT VISIT MOD MDM: CPT | Performed by: EMERGENCY MEDICINE

## 2022-11-03 PROCEDURE — 96372 THER/PROPH/DIAG INJ SC/IM: CPT | Performed by: EMERGENCY MEDICINE

## 2022-11-03 ASSESSMENT — PAIN - FUNCTIONAL ASSESSMENT: PAIN_FUNCTIONAL_ASSESSMENT: 0-10

## 2022-11-03 ASSESSMENT — PAIN SCALES - GENERAL: PAINLEVEL_OUTOF10: 0

## 2022-11-03 NOTE — ED NOTES
Pt has chronic carr catheter. Reports emptying it out a few hours ago and since then has had no output. Reports when he was emptying it there was a foul odor and mucous present .  Denies pain      Cookie Blancas RN  11/03/22 1903

## 2022-11-04 VITALS
DIASTOLIC BLOOD PRESSURE: 87 MMHG | TEMPERATURE: 98.6 F | RESPIRATION RATE: 18 BRPM | SYSTOLIC BLOOD PRESSURE: 155 MMHG | HEART RATE: 71 BPM | OXYGEN SATURATION: 98 % | BODY MASS INDEX: 18.43 KG/M2 | WEIGHT: 123 LBS

## 2022-11-04 LAB
BILIRUB UR QL: NEGATIVE
CHP ED QC CHECK: NORMAL
GLUCOSE UR QL STRIP.AUTO: NEGATIVE MG/DL
KETONES UR-MCNC: NEGATIVE MG/DL
LEUKOCYTE ESTERASE UR QL STRIP: ABNORMAL
NITRITE UR QL: NEGATIVE
PH UR: 5.5 [PH] (ref 5–9)
PROT UR QL: 100 MG/DL
RBC # UR STRIP: ABNORMAL /UL
SERVICE CMNT-IMP: ABNORMAL
SP GR UR: 1.02 (ref 1–1.02)
UROBILINOGEN UR QL: 0.2 EU/DL (ref 0.2–1)

## 2022-11-04 PROCEDURE — 2500000003 HC RX 250 WO HCPCS: Performed by: EMERGENCY MEDICINE

## 2022-11-04 PROCEDURE — 81003 URINALYSIS AUTO W/O SCOPE: CPT

## 2022-11-04 PROCEDURE — 6360000002 HC RX W HCPCS: Performed by: EMERGENCY MEDICINE

## 2022-11-04 RX ORDER — CIPROFLOXACIN 500 MG/1
500 TABLET, FILM COATED ORAL 2 TIMES DAILY
Qty: 20 TABLET | Refills: 0 | Status: ON HOLD | OUTPATIENT
Start: 2022-11-04 | End: 2022-11-18 | Stop reason: HOSPADM

## 2022-11-04 RX ADMIN — LIDOCAINE HYDROCHLORIDE 1000 MG: 10 INJECTION, SOLUTION INFILTRATION; PERINEURAL at 01:21

## 2022-11-04 ASSESSMENT — ENCOUNTER SYMPTOMS
DIARRHEA: 0
ABDOMINAL PAIN: 0
NAUSEA: 0

## 2022-11-04 NOTE — ED NOTES
I have reviewed discharge instructions with the patient. The patient verbalized understanding. Patient left ED via Discharge Method: ambulatory to Home with security to find his car in the parking lot. Opportunity for questions and clarification provided. Patient given 1 scripts. To continue your aftercare when you leave the hospital, you may receive an automated call from our care team to check in on how you are doing. This is a free service and part of our promise to provide the best care and service to meet your aftercare needs.  If you have questions, or wish to unsubscribe from this service please call [phone]. Thank you for Choosing our [facility] Emergency Department.         Belinda Zapata RN  11/04/22 0131

## 2022-11-04 NOTE — ED PROVIDER NOTES
Emergency Department Provider Note                   PCP:                Julianne Mesa DO               Age: 76 y.o. Sex: male       ICD-10-CM    1. Problem with Carr catheter, initial encounter ([facility]  T83. 9XXA       2. Urinary tract infection associated with catheterization of urinary tract, unspecified indwelling urinary catheter type, initial encounter [facility])  T83.511A     N39.0           DISPOSITION Decision To Discharge 11/04/2022 01:09:07 AM        MDM     Amount and/or Complexity of Data Reviewed  Clinical lab tests: ordered and reviewed  Review and summarize past medical records: yes  Independent visualization of images, tracings, or specimens: yes    Risk of Complications, Morbidity, and/or Mortality  Presenting problems: moderate  Diagnostic procedures: moderate  Management options: moderate               Orders Placed This Encounter   Procedures    Insert carr catheter    POCT Urinalysis no Micro    POCT Urinalysis no Micro        Medications   cefTRIAXone (ROCEPHIN) 1,000 mg in sodium chloride 0.9 % 50 mL IVPB mini-bag (has no administration in time range)       New Prescriptions    CIPROFLOXACIN (CIPRO) 500 MG TABLET    Take 1 tablet by mouth 2 times daily for 10 days        Catherine Tai is a 76 y.o. male who presents to the Emergency Department with chief complaint of    Chief Complaint   Patient presents with    Urinary Retention      Patient is a 64-year-old male who states he has a chronic Carr catheter for several years and at 6 PM it stopped draining. Patient states he had urine draining from around the Carr catheter. Patient also states he has had a foul odor to his urine when emptying his urine prior to this. Patient denies any abdominal pain. Patient denies any fevers or chills denies any other complaints. The history is provided by the patient.    Male  Problem  Presenting symptoms: dysuria    Presenting symptoms comment:  Carr catheter malfunction  Relieved by: Nothing  Worsened by:  Nothing  Ineffective treatments:  None tried  Associated symptoms: urinary retention    Associated symptoms: no abdominal pain, no diarrhea, no hematuria, no nausea, no penile redness and no urinary hesitation    Risk factors: no bladder surgery and no kidney stones        Review of Systems   Gastrointestinal:  Negative for abdominal pain, diarrhea and nausea. Genitourinary:  Positive for dysuria. Negative for hematuria and hesitancy. John catheter malfunction   All other systems reviewed and are negative.     Past Medical History:   Diagnosis Date    Acute deep vein thrombosis (DVT) of non-extremity vein 5/20/2019    Family history of malignant neoplasm of gastrointestinal tract     mother colon/ovarian cancer 67    Fecal incontinence     LAR syndrome    John catheter in place 2022    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    surgery and radiation    Psychiatric disorder     anxiety        Past Surgical History:   Procedure Laterality Date    COLONOSCOPY  last 2/3/15    Roberto--no polyps--3 year recall    COLONOSCOPY N/A 3/5/2021    COLONOSCOPY/BMI 19 performed by Cinthya Faulkner MD at [facility] ENDOSCOPY    COLONOSCOPY N/A 2/12/2018    COLONOSCOPY / BMI=21 performed by Nelly De Luna MD at [place] [address]  2/12/2018         COLONOSCOPY THRU STOMA,JEFF RMVL W/SNARE  3/5/2021         COLOSTOMY  5/11/16    CT RETROPERITONEAL PERC DRAIN  5/13/2021    CT RETROPERITONEAL PERC DRAIN 5/13/2021 [facility] CT SCAN    CT RETROPERITONEAL PERC DRAIN  5/20/2021    CT RETROPERITONEAL PERC DRAIN 5/20/2021 [facility] RAD[facility]Y CT SCAN    GI  4/2016    Sacral nerve stimlator lead trial (unsucessful) and removal    HERNIA REPAIR      and Colostomy in May    HERNIA REPAIR  3/2015, 5/2016    IR ABSCESS EVAL THRU EXISTING CATH  11/24/2021    IR ABSCESS EVAL THRU EXISTING CATH  6/4/2021    IR ABSCESS EVAL THRU EXISTING CATH  5/20/2021    IR NEPHROSTOGRAM EXISTING ACCESS  12/10/2021    IR NEPHROSTOGRAM EXISTING ACCESS  10/4/2021    IR NEPHROSTOMY EXCHANGE CATHETER  12/7/2021    IR NEPHROSTOMY EXCHANGE CATHETER  11/3/2021    IR NEPHROSTOMY EXCHANGE CATHETER  10/28/2021    IR NEPHROSTOMY PERCUTANEOUS LEFT  9/16/2021    IR NEPHROSTOMY PERCUTANEOUS LEFT  9/16/2021    IR NEPHROSTOMY PERCUTANEOUS LEFT 9/16/2021 [facility] SPECIALS    IR REPLCE T CVC WO PORT SAME ACC  5/30/2019    IR TUBE CHANGE  8/5/2021    IR TUBE CHANGE  7/8/2021    IR TUBE CHANGE  6/14/2021    IR TUBE CHANGE  5/27/2021    IR TUNNELED CATHETER PLACEMENT GREATER THAN 5 YEARS  5/20/2019    IR TUNNELED CATHETER PLACEMENT GREATER THAN 5 YEARS  5/20/2019    IR TUNNELED CATHETER PLACEMENT GREATER THAN 5 YEARS 5/20/2019 [facility] SPECIALS    OTHER SURGICAL HISTORY Bilateral     cataracts  2017    OTHER SURGICAL HISTORY  10/7/2013    Roberto---endorectal us    POLYPECTOMY      TOTAL COLECTOMY  11/2013    Roberto--lap LAR with coloproctostomy, mobilization splenic flexure, diverting loop ileostomy    TOTAL COLECTOMY Right 8/2014    for leak    VASCULAR SURGERY Left 4/14    single lumen port/subsequently removed        Family History   Problem Relation Age of Onset    Cancer Mother         colon and ovarian    Cancer Brother         prostate    Alcohol Abuse Brother     Cancer Maternal Grandmother         bone    Alcohol Abuse Father     Stroke Paternal Grandfather     Alcohol Abuse Sister         Social History     Socioeconomic History Marital status: Single   Tobacco Use    Smoking status: Former     Packs/day: 1.00     Types: Cigarettes     Quit date: 1963     Years since quittin.0    Smokeless tobacco: Never   Substance and Sexual Activity    Alcohol use: Not Currently     Alcohol/week: 11.7 standard drinks    Drug use: No         Patient has no known allergies. Previous Medications    ACETAMINOPHEN (TYLENOL) 500 MG TABLET    Take 500 mg by mouth every 6 hours as needed for Pain    AMITRIPTYLINE (ELAVIL) 50 MG TABLET    Take 1 tablet by mouth nightly    CARVEDILOL (COREG) 6.25 MG TABLET    Take 1 tablet by mouth 2 times daily (with meals)    CLONAZEPAM (KLONOPIN) 2 MG TABLET    Take 2 tablets by mouth nightly as needed for Anxiety for up to 3 days. IBUPROFEN (ADVIL;MOTRIN) 600 MG TABLET    Take 600 mg by mouth every 6 hours as needed for Pain    LEVOTHYROXINE (SYNTHROID) 125 MCG TABLET    TAKE ONE TABLET BY MOUTH ONE TIME DAILY BEFORE BREAKFAST    OLANZAPINE (ZYPREXA) 2.5 MG TABLET    Take 7.5 mg by mouth nightly        Vitals signs and nursing note reviewed. Patient Vitals for the past 4 hrs:   BP SpO2   22 0028 (!) 164/95 98 %   22 2358 (!) 171/82 --   22 2330 (!) 155/80 97 %   22 2300 (!) 143/91 --   22 2258 -- 100 %          Physical Exam  Vitals and nursing note reviewed. Constitutional:       Appearance: Normal appearance. HENT:      Head: Normocephalic and atraumatic. Nose: Nose normal.      Mouth/Throat:      Mouth: Mucous membranes are moist.   Eyes:      Extraocular Movements: Extraocular movements intact. Conjunctiva/sclera: Conjunctivae normal.      Pupils: Pupils are equal, round, and reactive to light. Cardiovascular:      Rate and Rhythm: Normal rate. Pulses: Normal pulses. Heart sounds: Normal heart sounds. Pulmonary:      Effort: Pulmonary effort is normal.      Breath sounds: Normal breath sounds. Abdominal:      General: Abdomen is flat. Genitourinary:     Comments: John catheter in right leg with urine present. Musculoskeletal:         General: Normal range of motion. Cervical back: Normal range of motion and neck supple. Skin:     General: Skin is warm. Capillary Refill: Capillary refill takes less than 2 seconds. Neurological:      General: No focal deficit present. Mental Status: He is alert and oriented to person, place, and time. Psychiatric:         Mood and Affect: Mood normal.         Behavior: Behavior normal.         Thought Content: Thought content normal.         Judgment: Judgment normal.        Procedures    Results for orders placed or performed during the hospital encounter of 11/03/22   POCT Urinalysis no Micro   Result Value Ref Range    QC OK? pass    POCT Urinalysis no Micro   Result Value Ref Range    Specific Gravity, Urine, POC 1.025 (H) 1.001 - 1.023      pH, Urine, POC 5.5 5.0 - 9.0      Protein, Urine,  (A) NEG mg/dL    Glucose, UA POC Negative NEG mg/dL    Ketones, Urine, POC Negative NEG mg/dL    Bilirubin, Urine, POC Negative NEG      Blood, UA POC MODERATE (A) NEG      URINE UROBILINOGEN POC 0.2 0.2 - 1.0 EU/dL    Nitrate, Urine, POC Negative NEG      Leukocyte Est, UA POC LARGE (A) NEG      Performed by: Renae Moss         No orders to display                       Voice dictation software was used during the making of this note. This software is not perfect and grammatical and other typographical errors may be present. This note has not been completely proofread for errors.        Roslyn Juárez MD  11/04/22 0111

## 2022-11-07 ENCOUNTER — TELEPHONE (OUTPATIENT)
Dept: UROLOGY | Age: 77
End: 2022-11-07

## 2022-11-07 ENCOUNTER — CARE COORDINATION (OUTPATIENT)
Dept: CARE COORDINATION | Facility: CLINIC | Age: 77
End: 2022-11-07

## 2022-11-07 NOTE — TELEPHONE ENCOUNTER
----- Message from Bisi Parker MD sent at 10/28/2022 12:06 PM EDT -----  Regarding: [address]: [address] [facility]    Surgeon: Karen Hollingsworth    Assist: NONE    Diagnosis: Right Hydronephrosis    Procedure: Cystoscopy, Right Retrograde Pyelogram, Right Ureteral Stent Exchange    Posting time: 30 minutes     Special Instruments Needed:  NONE    Anesthesia: GENERAL    Labs: U/A    Tests: EKG per anesthesia    Blood: NONE    Bowel Prep: NONE    Special Instructions: None    Orders/HandP:     Follow up appointment: TBD

## 2022-11-08 ENCOUNTER — APPOINTMENT (OUTPATIENT)
Dept: PHYSICAL THERAPY | Age: 77
End: 2022-11-08
Payer: MEDICARE

## 2022-11-09 ENCOUNTER — CARE COORDINATION (OUTPATIENT)
Dept: CARE COORDINATION | Facility: CLINIC | Age: 77
End: 2022-11-09

## 2022-11-09 DIAGNOSIS — R53.1 WEAKNESS: Primary | ICD-10-CM

## 2022-11-09 NOTE — CARE COORDINATION
RNCM 2nd unsuccessful attempt to reach pt, no answer left HIPAA compliant vm. No further attempts at this time as pt is unable to be reached.

## 2022-11-09 NOTE — CARE COORDINATION
RNCM received call back from pt who reports he is doing well since ED visit on 11/3/22 w/ carr cath problem. He denies further concerns with this or other needs, declines CCM services at this time.

## 2022-11-10 ENCOUNTER — APPOINTMENT (OUTPATIENT)
Dept: PHYSICAL THERAPY | Age: 77
End: 2022-11-10
Payer: MEDICARE

## 2022-11-11 ENCOUNTER — HOSPITAL ENCOUNTER (OUTPATIENT)
Dept: PHYSICAL THERAPY | Age: 77
Setting detail: RECURRING SERIES
Discharge: HOME | End: 2022-11-14
Payer: MEDICARE

## 2022-11-11 PROCEDURE — 97110 THERAPEUTIC EXERCISES: CPT

## 2022-11-11 PROCEDURE — 97162 PT EVAL MOD COMPLEX 30 MIN: CPT

## 2022-11-11 NOTE — TELEPHONE ENCOUNTER
Procedures: Procedure(s):   CYSTOSCOPY, RIGHT RETROGRADE PYELOGRAM, RIGHT URETERAL STENT EXCHANGE   Date: 12/9/2022   Time: 1300   Location: [facility] OP OR 04     Scheduled. Please complete orders.

## 2022-11-11 NOTE — PLAN OF CARE
Hardeep Nolasco  : 1945  Primary: Medicare Part A And B  Secondary: [address] [place]  [address] [facility] [address]  Phone: [phone]  Fax: [phone] Plan Frequency: 2 times a week for 12 weeks    Plan of Care/Certification Expiration Date: 23      PT Visit Info:    No data recorded    Visit Count:  19    OUTPATIENT PHYSICAL THERAPY:OP NOTE TYPE: Initial Assessment and Discontinuation Summary 2022               Episode  Appt Desk         Treatment Diagnosis:  Generalized Muscle Weakness (M62.81)  Difficulty in walking, Not elsewhere classified (R26.2)  Other abnormalities of gait and mobility (R26.89)  Medical/Referring Diagnosis:  Weakness [R53.1]  Referring Physician:  Edmundo Story DO MD Orders:  PT Eval and Treat   Return MD Appt:  TBD  Date of Onset:  Onset Date: 21     Allergies:  Patient has no known allergies. Restrictions/Precautions:    Restrictions/Precautions: Fall Risk  Required Braces or Orthoses?: No  No data recorded   Medications Last Reviewed:  2022     SUBJECTIVE   History of Injury/Illness (Reason for Referral):  Pt returns to complete therapy after hospitalization lOcto/2022 due to hydronephrosis and surgery for renal stent replacement. He received [facility] after this, is now transferring here to complete rehab due to ongoing LE weakness and gait difficulties, balance problems/unsteadiness when walking. - he comes with LE weakness related to rectal cancer and colorectal reconstructive procedures in which a gracilis muscle was used, for which he had started PT prior to his recent hospitalization. His complaints are of lower extremity weakness and impaired gait - he has difficulty taking a long stride, walking with normal velocity, problems lifting his right leg  for transfers, getting into bed and car.  Addendum: 22: Pt was seen for only initial visit on 22: he was hospitalized after that visit - Therapy discharged due to hospitalization. Patient Stated Goal(s):  \"Walk normal\"  Initial:   0   /10 Post Session:    0  /10  Past Medical History/Comorbidities:   Mr. Melita Moseley  has a past medical history of Acute deep vein thrombosis (DVT) of non-extremity vein, Family history of malignant neoplasm of gastrointestinal tract, Fecal incontinence, John catheter in place, Former cigarette smoker, History of rectal cancer, Hypothyroid, Infection and inflammatory reaction due to other internal prosthetic devices, implants and grafts, subsequent encounter, Insomnia, Major depression, Osteoarthritis, hand, Personal history of colonic polyps, Personal history of malignant neoplasm of rectum, rectosigmoid junction, and anus, and Psychiatric disorder. Mr. Melita Moseley  has a past surgical history that includes IR GUIDED NEPHROSTOGRAM/URETEROGRAM EXISTING ACCESS (12/10/2021); total colectomy (11/2013); IR GUIDED NEPHROSTOMY CATH EXCHANGE (12/7/2021); IR ABSCESS EVAL THRU EXISTING CATH (11/24/2021); IR GUIDED NEPHROSTOMY CATH EXCHANGE (11/3/2021); IR GUIDED NEPHROSTOMY CATH EXCHANGE (10/28/2021); IR GUIDED NEPHROSTOGRAM/URETEROGRAM EXISTING ACCESS (10/4/2021); IR TUBE/CATH CHANGE W CONTRAST (8/5/2021); IR GUIDED NEPHROSTOMY CATH PLACEMENT LEFT (9/16/2021); Colonoscopy (last 2/3/15); vascular surgery (Left, 4/14); hernia repair; hernia repair (3/2015, 5/2016); total colectomy (Right, 8/2014); IR TUBE/CATH CHANGE W CONTRAST (7/8/2021); IR TUBE/CATH CHANGE W CONTRAST (6/14/2021); IR ABSCESS EVAL THRU EXISTING CATH (6/4/2021); IR TUBE/CATH CHANGE W CONTRAST (5/27/2021); IR ABSCESS EVAL THRU EXISTING CATH (5/20/2021); Colonoscopy (N/A, 3/5/2021); colonoscopy thru stoma,lesn rmvl w/snare (3/5/2021); IR REPLACE TUNNELED CVC WO SQ PORT/PUMP SAME ACCESS (5/30/2019); IR TUNNELED CVC PLACE WO SQ PORT/PUMP > 5 YEARS (5/20/2019);  Colonoscopy (N/A, 2/12/2018); colonoscopy thru stoma (2/12/2018); other surgical history (Bilateral); polypectomy; other surgical history (10/7/2013); gi (4/2016); colostomy (5/11/16); CT PERITONEAL/RETROPERITONEAL PERC DRAIN (5/13/2021); IR TUNNELED CVC PLACE WO SQ PORT/PUMP > 5 YEARS (5/20/2019); CT PERITONEAL/RETROPERITONEAL PERC DRAIN (5/20/2021); and IR GUIDED NEPHROSTOMY CATH PLACEMENT LEFT (9/16/2021). Social History/Living Environment:   Lives With: Alone  Type of Home: House  Home Layout: Two level  Home Access: Stairs to enter with rails  Bathroom Shower/Tub: Tub/Shower unit  Bathroom Toilet: Standard  Bathroom Accessibility: Accessible     Prior Level of Function/Work/Activity:   Prior level of function: Independent  Occupation: Retired  Receives Help From: Family  ADL Assistance: Independent  Toileting: Needs assistance  Homemaking Assistance: Independent  Homemaking Responsibilities: No  Ambulation Assistance: Needs assistance  Transfer Assistance: Independent  Active : Yes  Mode of Transportation: Car  No data recorded   Learning:   Does the patient/guardian have any barriers to learning?: No barriers  Will there be a co-learner?: No  What is the preferred language of the patient/guardian?: English  Is an  required?: No  How does the patient/guardian prefer to learn new concepts?: Listening; Reading; Demonstration; Pictures/Videos     Fall Risk Scale: Powers Total Score: 60  Powers Fall Risk: High (45 and higher)     Dominant Side:  right handed      OBJECTIVE     Observation  Posture: R thoracic kyphosis  Gait: wide based, out-toes bilaterally. He uses no assistive device. Step lengths are short bilaterally ( doesn't pass the opposite foot when stepping. TUG test: 18.5 seconds, no assistive device. Pt shuffles during turns.      ROM (L/R in °) AROM(PROM)  Hip flexion  132/132  Hip extension  0/0  Hip ER  35 / 40  Hip IR   10 /0    Knee extension  (-5) /( -5)  Knee flexion   138 /136          Strength (L/R) MMT or using dynamometer (kg)  Hip flexion 10.8kg / 4.6 kg  Hip ABD  6.8kg / 9.5 kg  Hip ADD 2.7kg / 1.3 kg  Hip extension  4/5   /   4/5  Hip ER  3+/5  / 4-/5  Hip IR   3/5   / 3+/5    Knee extension  13.9kg / 13.8 kg   Knee flexion   9.4 kg  / 6.9 kg     Ankle DF   4/ 5  /  4/5  Ankle PF   4/5   /  4/5  Ankle eversion  4+/5  / 4/5  Ankle inversion  4/5   / 4-/5      Special Tests  Transfers sit-stand without use of hands, supine -sit independently but with effort, sit-supine independently but with self UE assist to lift R LE onto table. Balance:  Tandem = 30 seconds with close supervision. Single leg left = 3 seconds, right = unable    ASSESSMENT   Initial Assessment:  Patient demonstrates impaired LE strengths, right side worse than left , with functional deficits resulting including difficulty performing supine-sit-stand transfers, self dressing, climbing/descending stairs ( 13 steps at home)  and walking with normal gait speed and pattern, as well as inability to single leg stand. Pt requires skilled PT to address these deficits and improve functional capacity as well as to prevent falls. ADDENDUM: Goals not met - single visit, pt hospitalized after this visit. Problem List: (Impacting functional limitations): Body Structures, Functions, Activity Limitations Requiring Skilled Therapeutic Intervention: Decreased functional mobility ; Decreased ADL status; Decreased ROM; Decreased body mechanics; Decreased strength; Decreased endurance; Decreased balance     Therapy Prognosis:   Therapy Prognosis: Good     Assessment Complexity:   Medium Complexity  PLAN   Effective Dates: 11/11/22  TO Plan of Care/Certification Expiration Date: 02/09/23     Frequency/Duration: Plan Frequency: 2 times a week for 12 weeks     Interventions Planned (Treatment may consist of any combination of the following):    Current Treatment Recommendations: Strengthening; ROM; Balance training; Functional mobility training; Transfer training;  Endurance training; Gait training; Stair training; Home exercise program; Therapeutic activities     Goals: (Goals have been discussed and agreed upon with patient.)  Short-Term Functional Goals: Time Frame: 6 weeks  Independent performance of home exercise program (UNMET - early discharge due to hospitalization)  Improve single leg balance > 5 seconds , tandem balance to 45\" UNMET - early discharge due to hospitalization)  Improve TUG test score to < 15 seconds UNMET - early discharge due to hospitalization)  Discharge Goals: Time Frame: 12 weeks  Improve TUG test score to <  12 seconds. UNMET - early discharge due to hospitalization)  Improve R LE strengths t x 15% or >  flexors to allow less fatigue during all transfers and overground gait UNMET - early discharge due to hospitalization)  Improved standing balance: single leg > 8 seconds, narrow base/tandem to 60\" or better for fall prevention. UNMET - early discharge due to hospitalization)         Outcome Measure: Tool Used: Activities-Specific Balance Confidence (ABC) Scale  Score:  Initial: 12.5% Confidence Most Recent: X% Confidence (Date: -- )   Interpretation of Score: The test rates the patients percentage of confidence with functional daily activities from 0%, indicating no confidence, to 100%, indicating complete confidence. The percentages are added together and then averaged. If the average is less than 80%, this indicates significant impairment with daily activities. Medical Necessity:   > Patient is expected to demonstrate progress in strength, balance, and functional technique to decrease assistance required with Transfers and improve safety during Gait. Reason For Services/Other Comments:  Therapy discontinued after first visit due to hospitalization. Total Duration:  Time In: 1591  Time Out: 5072    Regarding Estelle Nolasco's therapy, I certify that the treatment plan above will be carried out by a therapist or under their direction.   Thank you for this referral,  Daryle Kid, PT     Referring Physician Signature: Jorge Rain DO No Signature is Required for this note.         Post Session Pain  Charge Capture  PT Visit Info MD Guidelines  MyChart

## 2022-11-11 NOTE — PROGRESS NOTES
Jie Nolasco  : 1945  Primary: Medicare Part A And B  Secondary: [address] [place]  [address] [facility] [address]  Phone: [phone]  Fax: [phone] Plan Frequency: 2 times a week for 12 weeks    Plan of Care/Certification Expiration Date: 23      PT Visit Info:  No data recorded   Visit Count:  19   OUTPATIENT PHYSICAL THERAPY:OP NOTE TYPE: Treatment Note 2022       Episode  }Appt Desk             Treatment Diagnosis:  Generalized Muscle Weakness (M62.81)  Difficulty in walking, Not elsewhere classified (R26.2)  Other abnormalities of gait and mobility (R26.89)  Medical/Referring Diagnosis:  Weakness [R53.1]  Referring Physician:  Katina Dennis DO MD Orders:  PT Eval and Treat   Date of Onset:  Onset Date: 21     Allergies:   Patient has no known allergies. Restrictions/Precautions:  Restrictions/Precautions: Fall Risk  Required Braces or Orthoses?: No  No data recorded     Interventions Planned (Treatment may consist of any combination of the following):    Current Treatment Recommendations: Strengthening; ROM; Balance training; Functional mobility training; Transfer training; Endurance training; Gait training; Stair training; Home exercise program; Therapeutic activities     Subjective Comments:   See today's initial evaluation report  Initial:}   0  /10Post Session:     0   /10  Medications Last Reviewed:  2022  Updated Objective Findings:   Below findings from initial evaluation unless otherwise noted:     Observation  Posture: R thoracic kyphosis  Gait: wide based, out-toes bilaterally. He uses no assistive device. Step lengths are short bilaterally ( doesn't pass the opposite foot when stepping. TUG test: 18.5 seconds, no assistive device. Pt shuffles during turns.      ROM (L/R in °) AROM(PROM)  Hip flexion  132/132  Hip extension  0/0  Hip ER  35 / 40  Hip IR   10 /0  Knee extension  (-5) /( -5)  Knee flexion   138 /136    Strength (L/R) MMT or using dynamometer (kg)  Hip flexion  10.8kg / 4.6 kg  Hip ABD  6.8kg / 9.5 kg  Hip ADD 2.7kg / 1.3 kg  Hip extension  4/5   /   4/5  Hip ER  3+/5  / 4-/5  Hip IR   3/5   / 3+/5  Knee extension  13.9kg / 13.8 kg   Knee flexion   9.4 kg  / 6.9 kg   Ankle DF   4/ 5  /  4/5  Ankle PF   4/5   /  4/5  Ankle eversion  4+/5  / 4/5  Ankle inversion  4/5   / 4-/5    Special Tests  Transfers sit-stand without use of hands, supine -sit independently but with effort, sit-supine independently but with self UE assist to lift R LE onto table. Balance:  Tandem = 30 seconds with close supervision. Single leg left = 3 seconds, right = unable  Treatment     TREATMENT:   THERAPEUTIC ACTIVITY: ( see below for minutes): Therapeutic activities per grid below to improve mobility, strength, balance and coordination. Required minimal visual, verbal, manual and tactile cues to improve independence and safety with daily activities . THERAPEUTIC EXERCISE: (see below for minutes): Exercises per grid below to improve mobility, strength, balance and coordination. Required minimal verbal and manual cues to promote proper body alignment, promote proper body posture and promote proper body mechanics. Progressed resistance, range, repetitions and complexity of movement as indicated. MANUAL THERAPY: (see below for minutes): Joint mobilization and Soft tissue mobilization was utilized and necessary because of the patient's restricted joint motion, painful spasm, loss of articular motion and restricted motion of soft tissue. MODALITIES: (see below for minutes): to decrease pain   SELF CARE: (see below for minutes): Procedure(s) (per grid) utilized to improve and/or restore self-care/home management as related to dressing, bathing and grooming.  Required minimal verbal cueing to facilitate activities of daily living skills and compensatory activities     Goals: (Goals have been discussed and agreed upon with patient.)  Short-Term Functional Goals: Time Frame: 6 weeks  Independent performance of home exercise program  Improve single leg balance > 5 seconds , tandem balance to 45\"   Improve TUG test score to < 15 seconds   Discharge Goals: Time Frame: 12 weeks  Improve TUG test score to <  12 seconds. Improve R LE strengths t x 15% or >  flexors to allow less fatigue during all transfers and overground gait   Improved standing balance: single leg > 8 seconds, narrow base/tandem to 60\" or better for fall prevention. Date: 11/11/22 (visit 1)       Modalities:                                Therapeutic Exercise: 25 min        Seated stepper: 8 min, #4 resistance        Hip active Logrolling: 2 x 15 L/R        Bridges (knees over bolster): 2 x 12        LAQ's w/ 10# L/R: 12x each        Seated hip flexion: 2 x 8, w/L = 10#, R = 5#        Hooklying LTR's: 90 sec AROM, 90 sec w/ manl resist at knees                                                Proprioceptive Activities:                                Manual Therapy:                        Functional Activities:                                            Treatment/Session Summary:    Treatment Assessment: See today's initial evaluation report. Communication/Consultation:  None today  Equipment provided today:  None  Recommendations/Intent for next treatment session: Next visit will focus on Aerobic conditioning to increase gait endurance. Gait and functional mobility training, LE strength, balance (standing), HEP.     Total Treatment Billable Duration:  44 minutes  Time In: 0571  Time Out: 2150    Noemí Champion PT       Charge Capture  }Post Session Pain  PT Visit Info  [employer] Portal  MD Guidelines  Scanned Media  Benefits  MyChart    Future Appointments   Date Time Provider Dot Gusman   11/16/2022  8:45 AM Noemí Champion PT [facility]   11/18/2022  8:00 AM Noemí Champion PT [facility]   11/21/2022  9:00 AM [id number] INJECTION/CATH [id number] [facility] AMB   11/23/2022 11:00 AM Alicia Marquis, PT [facility]   11/30/2022  9:15 AM Neema Monzon MD [id number] [facility] AMB   12/2/2022  8:45 AM Alicia Marquis PT [facility] O   12/9/2022  8:30 AM Lugene Bamberger, DO TRIM [facility] AMB

## 2022-11-14 ENCOUNTER — HOSPITAL ENCOUNTER (INPATIENT)
Age: 77
LOS: 4 days | Discharge: NURSING, REHAB OR LONG-TERM CARE | DRG: 388 | End: 2022-11-18
Attending: STUDENT IN AN ORGANIZED HEALTH CARE EDUCATION/TRAINING PROGRAM | Admitting: INTERNAL MEDICINE
Payer: MEDICARE

## 2022-11-14 ENCOUNTER — HOSPITAL ENCOUNTER (EMERGENCY)
Dept: GENERAL RADIOLOGY | Age: 77
Discharge: HOME | DRG: 388 | End: 2022-11-17
Payer: MEDICARE

## 2022-11-14 ENCOUNTER — APPOINTMENT (OUTPATIENT)
Dept: CT IMAGING | Age: 77
DRG: 388 | End: 2022-11-14
Payer: MEDICARE

## 2022-11-14 ENCOUNTER — APPOINTMENT (OUTPATIENT)
Dept: GENERAL RADIOLOGY | Age: 77
DRG: 388 | End: 2022-11-14
Payer: MEDICARE

## 2022-11-14 DIAGNOSIS — K56.609 SMALL BOWEL OBSTRUCTION (HCC): Primary | ICD-10-CM

## 2022-11-14 DIAGNOSIS — R55 SYNCOPE, UNSPECIFIED SYNCOPE TYPE: ICD-10-CM

## 2022-11-14 PROBLEM — K94.00 COLOSTOMY COMPLICATION, UNSPECIFIED (HCC): Status: ACTIVE | Noted: 2022-11-14

## 2022-11-14 LAB
ALBUMIN SERPL-MCNC: 3.3 G/DL (ref 3.2–4.6)
ALBUMIN/GLOB SERPL: 0.8 {RATIO} (ref 0.4–1.6)
ALP SERPL-CCNC: 67 U/L (ref 50–136)
ALT SERPL-CCNC: 33 U/L (ref 12–65)
ANION GAP SERPL CALC-SCNC: 6 MMOL/L (ref 2–11)
APPEARANCE UR: ABNORMAL
AST SERPL-CCNC: 28 U/L (ref 15–37)
BACTERIA URNS QL MICRO: ABNORMAL /HPF
BASOPHILS # BLD: 0 K/UL (ref 0–0.2)
BASOPHILS NFR BLD: 1 % (ref 0–2)
BILIRUB SERPL-MCNC: 0.4 MG/DL (ref 0.2–1.1)
BILIRUB UR QL: NEGATIVE
BILIRUB UR QL: NEGATIVE
BUN SERPL-MCNC: 27 MG/DL (ref 8–23)
CALCIUM SERPL-MCNC: 9.1 MG/DL (ref 8.3–10.4)
CHLORIDE SERPL-SCNC: 104 MMOL/L (ref 101–110)
CO2 SERPL-SCNC: 26 MMOL/L (ref 21–32)
COLOR UR: ABNORMAL
CREAT SERPL-MCNC: 1.5 MG/DL (ref 0.8–1.5)
DIFFERENTIAL METHOD BLD: ABNORMAL
EOSINOPHIL # BLD: 0.1 K/UL (ref 0–0.8)
EOSINOPHIL NFR BLD: 1 % (ref 0.5–7.8)
EPI CELLS #/AREA URNS HPF: ABNORMAL /HPF
ERYTHROCYTE [DISTWIDTH] IN BLOOD BY AUTOMATED COUNT: 15.8 % (ref 11.9–14.6)
GLOBULIN SER CALC-MCNC: 4.2 G/DL (ref 2.8–4.5)
GLUCOSE SERPL-MCNC: 121 MG/DL (ref 65–100)
GLUCOSE UR QL STRIP.AUTO: NEGATIVE MG/DL
GLUCOSE UR STRIP.AUTO-MCNC: NEGATIVE MG/DL
HCT VFR BLD AUTO: 37.3 % (ref 41.1–50.3)
HGB BLD-MCNC: 11.7 G/DL (ref 13.6–17.2)
HGB UR QL STRIP: ABNORMAL
IMM GRANULOCYTES # BLD AUTO: 0 K/UL (ref 0–0.5)
IMM GRANULOCYTES NFR BLD AUTO: 0 % (ref 0–5)
KETONES UR QL STRIP.AUTO: NEGATIVE MG/DL
KETONES UR-MCNC: NEGATIVE MG/DL
LACTATE SERPL-SCNC: 1.1 MMOL/L (ref 0.4–2)
LEUKOCYTE ESTERASE UR QL STRIP.AUTO: ABNORMAL
LEUKOCYTE ESTERASE UR QL STRIP: ABNORMAL
LIPASE SERPL-CCNC: 164 U/L (ref 73–393)
LYMPHOCYTES # BLD: 0.5 K/UL (ref 0.5–4.6)
LYMPHOCYTES NFR BLD: 7 % (ref 13–44)
MCH RBC QN AUTO: 29.2 PG (ref 26.1–32.9)
MCHC RBC AUTO-ENTMCNC: 31.4 G/DL (ref 31.4–35)
MCV RBC AUTO: 93 FL (ref 82–102)
MONOCYTES # BLD: 0.5 K/UL (ref 0.1–1.3)
MONOCYTES NFR BLD: 7 % (ref 4–12)
NEUTS SEG # BLD: 6.4 K/UL (ref 1.7–8.2)
NEUTS SEG NFR BLD: 84 % (ref 43–78)
NITRITE UR QL STRIP.AUTO: NEGATIVE
NITRITE UR QL: NEGATIVE
NRBC # BLD: 0 K/UL (ref 0–0.2)
OTHER OBSERVATIONS: ABNORMAL
PH UR STRIP: 5 [PH] (ref 5–9)
PH UR: 5.5 [PH] (ref 5–9)
PLATELET # BLD AUTO: 243 K/UL (ref 150–450)
PMV BLD AUTO: 9.4 FL (ref 9.4–12.3)
POTASSIUM SERPL-SCNC: 4.2 MMOL/L (ref 3.5–5.1)
PROT SERPL-MCNC: 7.5 G/DL (ref 6.3–8.2)
PROT UR QL: 100 MG/DL
PROT UR STRIP-MCNC: 100 MG/DL
RBC # BLD AUTO: 4.01 M/UL (ref 4.23–5.6)
RBC # UR STRIP: ABNORMAL /UL
RBC #/AREA URNS HPF: ABNORMAL /HPF
SODIUM SERPL-SCNC: 136 MMOL/L (ref 133–143)
SP GR UR REFRACTOMETRY: 1.01 (ref 1–1.02)
SP GR UR: 1.02 (ref 1–1.02)
UROBILINOGEN UR QL STRIP.AUTO: 0.2 EU/DL (ref 0.2–1)
UROBILINOGEN UR QL: 0.2 EU/DL (ref 0.2–1)
WBC # BLD AUTO: 7.5 K/UL (ref 4.3–11.1)
WBC URNS QL MICRO: >100 /HPF
YEAST URNS QL MICRO: ABNORMAL

## 2022-11-14 PROCEDURE — 6360000002 HC RX W HCPCS: Performed by: NURSE PRACTITIONER

## 2022-11-14 PROCEDURE — 87086 URINE CULTURE/COLONY COUNT: CPT

## 2022-11-14 PROCEDURE — 99285 EMERGENCY DEPT VISIT HI MDM: CPT | Performed by: STUDENT IN AN ORGANIZED HEALTH CARE EDUCATION/TRAINING PROGRAM

## 2022-11-14 PROCEDURE — 6360000004 HC RX CONTRAST MEDICATION: Performed by: STUDENT IN AN ORGANIZED HEALTH CARE EDUCATION/TRAINING PROGRAM

## 2022-11-14 PROCEDURE — 87040 BLOOD CULTURE FOR BACTERIA: CPT

## 2022-11-14 PROCEDURE — 2580000003 HC RX 258: Performed by: STUDENT IN AN ORGANIZED HEALTH CARE EDUCATION/TRAINING PROGRAM

## 2022-11-14 PROCEDURE — 87106 FUNGI IDENTIFICATION YEAST: CPT

## 2022-11-14 PROCEDURE — C9113 INJ PANTOPRAZOLE SODIUM, VIA: HCPCS | Performed by: NURSE PRACTITIONER

## 2022-11-14 PROCEDURE — 81001 URINALYSIS AUTO W/SCOPE: CPT

## 2022-11-14 PROCEDURE — 2580000003 HC RX 258: Performed by: NURSE PRACTITIONER

## 2022-11-14 PROCEDURE — 6370000000 HC RX 637 (ALT 250 FOR IP): Performed by: NURSE PRACTITIONER

## 2022-11-14 PROCEDURE — 97161 PT EVAL LOW COMPLEX 20 MIN: CPT

## 2022-11-14 PROCEDURE — 1100000000 HC RM PRIVATE

## 2022-11-14 PROCEDURE — 85025 COMPLETE CBC W/AUTO DIFF WBC: CPT

## 2022-11-14 PROCEDURE — 6360000002 HC RX W HCPCS: Performed by: STUDENT IN AN ORGANIZED HEALTH CARE EDUCATION/TRAINING PROGRAM

## 2022-11-14 PROCEDURE — 6370000000 HC RX 637 (ALT 250 FOR IP)

## 2022-11-14 PROCEDURE — 6370000000 HC RX 637 (ALT 250 FOR IP): Performed by: STUDENT IN AN ORGANIZED HEALTH CARE EDUCATION/TRAINING PROGRAM

## 2022-11-14 PROCEDURE — 96374 THER/PROPH/DIAG INJ IV PUSH: CPT | Performed by: STUDENT IN AN ORGANIZED HEALTH CARE EDUCATION/TRAINING PROGRAM

## 2022-11-14 PROCEDURE — 96375 TX/PRO/DX INJ NEW DRUG ADDON: CPT | Performed by: STUDENT IN AN ORGANIZED HEALTH CARE EDUCATION/TRAINING PROGRAM

## 2022-11-14 PROCEDURE — 81003 URINALYSIS AUTO W/O SCOPE: CPT

## 2022-11-14 PROCEDURE — 74018 RADEX ABDOMEN 1 VIEW: CPT

## 2022-11-14 PROCEDURE — A4216 STERILE WATER/SALINE, 10 ML: HCPCS | Performed by: NURSE PRACTITIONER

## 2022-11-14 PROCEDURE — 83690 ASSAY OF LIPASE: CPT

## 2022-11-14 PROCEDURE — 80053 COMPREHEN METABOLIC PANEL: CPT

## 2022-11-14 PROCEDURE — 74177 CT ABD & PELVIS W/CONTRAST: CPT

## 2022-11-14 PROCEDURE — 97530 THERAPEUTIC ACTIVITIES: CPT

## 2022-11-14 PROCEDURE — 87088 URINE BACTERIA CULTURE: CPT

## 2022-11-14 PROCEDURE — 83605 ASSAY OF LACTIC ACID: CPT

## 2022-11-14 PROCEDURE — 87186 SC STD MICRODIL/AGAR DIL: CPT

## 2022-11-14 RX ORDER — AMITRIPTYLINE HYDROCHLORIDE 50 MG/1
100 TABLET, FILM COATED ORAL NIGHTLY
Status: DISCONTINUED | OUTPATIENT
Start: 2022-11-15 | End: 2022-11-16

## 2022-11-14 RX ORDER — OLANZAPINE 5 MG/1
7.5 TABLET ORAL NIGHTLY
Status: DISCONTINUED | OUTPATIENT
Start: 2022-11-14 | End: 2022-11-14

## 2022-11-14 RX ORDER — ACETAMINOPHEN 325 MG/1
650 TABLET ORAL EVERY 6 HOURS PRN
Status: DISCONTINUED | OUTPATIENT
Start: 2022-11-14 | End: 2022-11-18 | Stop reason: HOSPADM

## 2022-11-14 RX ORDER — POLYETHYLENE GLYCOL 3350 17 G/17G
17 POWDER, FOR SOLUTION ORAL 2 TIMES DAILY
Status: DISCONTINUED | OUTPATIENT
Start: 2022-11-14 | End: 2022-11-18 | Stop reason: HOSPADM

## 2022-11-14 RX ORDER — ONDANSETRON 2 MG/ML
4 INJECTION INTRAMUSCULAR; INTRAVENOUS
Status: COMPLETED | OUTPATIENT
Start: 2022-11-14 | End: 2022-11-14

## 2022-11-14 RX ORDER — OLANZAPINE 2.5 MG/1
7.5 TABLET ORAL NIGHTLY
Status: DISCONTINUED | OUTPATIENT
Start: 2022-11-15 | End: 2022-11-16

## 2022-11-14 RX ORDER — SODIUM CHLORIDE 0.9 % (FLUSH) 0.9 %
5-40 SYRINGE (ML) INJECTION EVERY 12 HOURS SCHEDULED
Status: DISCONTINUED | OUTPATIENT
Start: 2022-11-14 | End: 2022-11-18 | Stop reason: HOSPADM

## 2022-11-14 RX ORDER — TRAMADOL HYDROCHLORIDE 50 MG/1
50 TABLET ORAL EVERY 6 HOURS PRN
Status: DISCONTINUED | OUTPATIENT
Start: 2022-11-14 | End: 2022-11-14

## 2022-11-14 RX ORDER — POLYETHYLENE GLYCOL 3350 17 G/17G
17 POWDER, FOR SOLUTION ORAL DAILY PRN
Status: DISCONTINUED | OUTPATIENT
Start: 2022-11-14 | End: 2022-11-18 | Stop reason: HOSPADM

## 2022-11-14 RX ORDER — SODIUM CHLORIDE 9 MG/ML
INJECTION, SOLUTION INTRAVENOUS PRN
Status: DISCONTINUED | OUTPATIENT
Start: 2022-11-14 | End: 2022-11-18 | Stop reason: HOSPADM

## 2022-11-14 RX ORDER — SODIUM CHLORIDE, SODIUM LACTATE, POTASSIUM CHLORIDE, CALCIUM CHLORIDE 600; 310; 30; 20 MG/100ML; MG/100ML; MG/100ML; MG/100ML
INJECTION, SOLUTION INTRAVENOUS CONTINUOUS
Status: DISCONTINUED | OUTPATIENT
Start: 2022-11-14 | End: 2022-11-18 | Stop reason: HOSPADM

## 2022-11-14 RX ORDER — 0.9 % SODIUM CHLORIDE 0.9 %
1000 INTRAVENOUS SOLUTION INTRAVENOUS
Status: DISCONTINUED | OUTPATIENT
Start: 2022-11-14 | End: 2022-11-14 | Stop reason: SDUPTHER

## 2022-11-14 RX ORDER — ONDANSETRON 4 MG/1
4 TABLET, ORALLY DISINTEGRATING ORAL EVERY 8 HOURS PRN
Status: DISCONTINUED | OUTPATIENT
Start: 2022-11-14 | End: 2022-11-18 | Stop reason: HOSPADM

## 2022-11-14 RX ORDER — CLONAZEPAM 1 MG/1
4 TABLET ORAL NIGHTLY
Status: DISCONTINUED | OUTPATIENT
Start: 2022-11-15 | End: 2022-11-18 | Stop reason: HOSPADM

## 2022-11-14 RX ORDER — ONDANSETRON 2 MG/ML
4 INJECTION INTRAMUSCULAR; INTRAVENOUS EVERY 6 HOURS PRN
Status: DISCONTINUED | OUTPATIENT
Start: 2022-11-14 | End: 2022-11-18 | Stop reason: HOSPADM

## 2022-11-14 RX ORDER — AMITRIPTYLINE HYDROCHLORIDE 25 MG/1
100 TABLET, FILM COATED ORAL NIGHTLY
Status: DISCONTINUED | OUTPATIENT
Start: 2022-11-14 | End: 2022-11-14

## 2022-11-14 RX ORDER — 0.9 % SODIUM CHLORIDE 0.9 %
100 INTRAVENOUS SOLUTION INTRAVENOUS ONCE
Status: DISCONTINUED | OUTPATIENT
Start: 2022-11-14 | End: 2022-11-18 | Stop reason: HOSPADM

## 2022-11-14 RX ORDER — MORPHINE SULFATE 4 MG/ML
4 INJECTION INTRAVENOUS ONCE
Status: COMPLETED | OUTPATIENT
Start: 2022-11-14 | End: 2022-11-14

## 2022-11-14 RX ORDER — LIDOCAINE HYDROCHLORIDE 20 MG/ML
15 SOLUTION OROPHARYNGEAL
Status: COMPLETED | OUTPATIENT
Start: 2022-11-14 | End: 2022-11-14

## 2022-11-14 RX ORDER — SODIUM CHLORIDE 0.9 % (FLUSH) 0.9 %
5-40 SYRINGE (ML) INJECTION PRN
Status: DISCONTINUED | OUTPATIENT
Start: 2022-11-14 | End: 2022-11-18 | Stop reason: HOSPADM

## 2022-11-14 RX ORDER — TRAMADOL HYDROCHLORIDE 50 MG/1
50 TABLET ORAL EVERY 6 HOURS PRN
Status: DISCONTINUED | OUTPATIENT
Start: 2022-11-14 | End: 2022-11-18 | Stop reason: HOSPADM

## 2022-11-14 RX ORDER — CLONAZEPAM 1 MG/1
4 TABLET ORAL NIGHTLY
Status: DISCONTINUED | OUTPATIENT
Start: 2022-11-14 | End: 2022-11-14

## 2022-11-14 RX ORDER — ACETAMINOPHEN 650 MG/1
650 SUPPOSITORY RECTAL EVERY 6 HOURS PRN
Status: DISCONTINUED | OUTPATIENT
Start: 2022-11-14 | End: 2022-11-18 | Stop reason: HOSPADM

## 2022-11-14 RX ADMIN — ACETAMINOPHEN 650 MG: 325 TABLET ORAL at 14:28

## 2022-11-14 RX ADMIN — SODIUM CHLORIDE, PRESERVATIVE FREE 10 ML: 5 INJECTION INTRAVENOUS at 20:46

## 2022-11-14 RX ADMIN — DIATRIZOATE MEGLUMINE AND DIATRIZOATE SODIUM 20 ML: 660; 100 LIQUID ORAL; RECTAL at 06:57

## 2022-11-14 RX ADMIN — SODIUM CHLORIDE, POTASSIUM CHLORIDE, SODIUM LACTATE AND CALCIUM CHLORIDE: 600; 310; 30; 20 INJECTION, SOLUTION INTRAVENOUS at 09:37

## 2022-11-14 RX ADMIN — CEFTRIAXONE 1000 MG: 1 INJECTION, POWDER, FOR SOLUTION INTRAMUSCULAR; INTRAVENOUS at 09:34

## 2022-11-14 RX ADMIN — SODIUM CHLORIDE, POTASSIUM CHLORIDE, SODIUM LACTATE AND CALCIUM CHLORIDE: 600; 310; 30; 20 INJECTION, SOLUTION INTRAVENOUS at 22:28

## 2022-11-14 RX ADMIN — AMITRIPTYLINE HYDROCHLORIDE 100 MG: 25 TABLET, FILM COATED ORAL at 22:16

## 2022-11-14 RX ADMIN — IOPAMIDOL 100 ML: 755 INJECTION, SOLUTION INTRAVENOUS at 08:06

## 2022-11-14 RX ADMIN — POLYETHYLENE GLYCOL 3350 17 G: 17 POWDER, FOR SOLUTION ORAL at 14:28

## 2022-11-14 RX ADMIN — OLANZAPINE 7.5 MG: 5 TABLET, FILM COATED ORAL at 22:15

## 2022-11-14 RX ADMIN — SODIUM CHLORIDE 40 MG: 9 INJECTION, SOLUTION INTRAMUSCULAR; INTRAVENOUS; SUBCUTANEOUS at 10:51

## 2022-11-14 RX ADMIN — CLONAZEPAM 4 MG: 1 TABLET ORAL at 22:15

## 2022-11-14 RX ADMIN — SODIUM CHLORIDE 40 MG: 9 INJECTION, SOLUTION INTRAMUSCULAR; INTRAVENOUS; SUBCUTANEOUS at 20:47

## 2022-11-14 RX ADMIN — POLYETHYLENE GLYCOL 3350 17 G: 17 POWDER, FOR SOLUTION ORAL at 20:46

## 2022-11-14 RX ADMIN — ONDANSETRON 4 MG: 2 INJECTION INTRAMUSCULAR; INTRAVENOUS at 06:58

## 2022-11-14 RX ADMIN — MORPHINE SULFATE 4 MG: 4 INJECTION INTRAVENOUS at 06:58

## 2022-11-14 RX ADMIN — LIDOCAINE HYDROCHLORIDE 15 ML: 20 SOLUTION ORAL; TOPICAL at 10:51

## 2022-11-14 ASSESSMENT — ENCOUNTER SYMPTOMS
SHORTNESS OF BREATH: 0
WHEEZING: 0
VOMITING: 0
CHEST TIGHTNESS: 0
ABDOMINAL DISTENTION: 1
COUGH: 0
BACK PAIN: 0
COLOR CHANGE: 0
NAUSEA: 1
ABDOMINAL PAIN: 1
CONSTIPATION: 1

## 2022-11-14 ASSESSMENT — PAIN DESCRIPTION - ORIENTATION
ORIENTATION: RIGHT;LOWER
ORIENTATION: RIGHT

## 2022-11-14 ASSESSMENT — PAIN DESCRIPTION - LOCATION
LOCATION: ABDOMEN
LOCATION: ABDOMEN

## 2022-11-14 ASSESSMENT — PAIN - FUNCTIONAL ASSESSMENT: PAIN_FUNCTIONAL_ASSESSMENT: 0-10

## 2022-11-14 ASSESSMENT — PAIN SCALES - GENERAL
PAINLEVEL_OUTOF10: 7
PAINLEVEL_OUTOF10: 3
PAINLEVEL_OUTOF10: 5

## 2022-11-14 NOTE — CONSULTS
Consult Note  Tristen Pool  MRN: [id number]  :1945  Age:77 y.o.    HPI: Tristen Pool is a 76 y.o. male who we are asked in consultation by Con Goldmann, NP (Hospitalist) to see for small bowel obstruction. The patient has a PMHx of rectal cancer (s/p total colectomy with ileostomy creation by Dr. Author Rodriguez ), chronic urinary catheter, hypothyroidism, anxiety, depression, insomnia, and OA. He presented 22 with one day onset of decreased ileostomy output with associated cramping abdominal pain 7/10 at worst. Associated symptoms included N without V. He was recently hospitalized 10/16-  for sudden onset BLE weakness, received IVIg and was followed by neurology. Pt  was dc'd to [facility] 10/27/22 and stated d/c from [facility] 11/10/22 in good health. CT A/P consistent with SBO. noted with large leukocytes in urine, started on Rocephin and urine culture ordered. He was admitted on 2022 for SBO.      Pt not taking anticoagulation  Pt NGT LIS at time of consult  Previous abdominal surgeries: total colectomy with ileostomy creation by Dr. Author Rodriguez , hernia repairs x 2 (, )      Past Medical History:   Diagnosis Date    Acute deep vein thrombosis (DVT) of non-extremity vein 2019    Family history of malignant neoplasm of gastrointestinal tract     mother colon/ovarian cancer 67    Fecal incontinence     LAR syndrome    John catheter in place     patient stated- \"catheter due to them nicking his bladder and it won't heal\"    Former cigarette smoker     History of rectal cancer     Hypothyroid     stable w/med    Infection and inflammatory reaction due to other internal prosthetic devices, implants and grafts, subsequent encounter     abd wound/mesh colostomy    Insomnia     takes meds    Major depression     Osteoarthritis, hand     Personal history of colonic polyps 9/2013    x 1    Personal history of malignant neoplasm of rectum, rectosigmoid junction, 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    surgery and radiation    Psychiatric disorder     anxiety     Past Surgical History:   Procedure Laterality Date    COLONOSCOPY  last 2/3/15    Roberto--no polyps--3 year recall    COLONOSCOPY N/A 3/5/2021    COLONOSCOPY/BMI 19 performed by Jannette Forbes MD at [facility] ENDOSCOPY    COLONOSCOPY N/A 2/12/2018    COLONOSCOPY / BMI=21 performed by Bernie Ward MD at [place] [id number]  2/12/2018         COLONOSCOPY THRU STOMA,LESN RMVL W/SNARE  3/5/2021         COLOSTOMY  5/11/16    CT RETROPERITONEAL PERC DRAIN  5/13/2021    CT RETROPERITONEAL PERC DRAIN 5/13/2021 [facility] CT SCAN    CT RETROPERITONEAL PERC DRAIN  5/20/2021    CT RETROPERITONEAL PERC DRAIN 5/20/2021 [facility] CT SCAN    GI  4/2016    Sacral nerve stimlator lead trial (unsucessful) and removal    HERNIA REPAIR      and Colostomy in May    HERNIA REPAIR  3/2015, 5/2016    IR ABSCESS EVAL THRU EXISTING CATH  11/24/2021    IR ABSCESS EVAL THRU EXISTING CATH  6/4/2021    IR ABSCESS EVAL THRU EXISTING CATH  5/20/2021    IR NEPHROSTOGRAM EXISTING ACCESS  12/10/2021    IR NEPHROSTOGRAM EXISTING ACCESS  10/4/2021    IR NEPHROSTOMY EXCHANGE CATHETER  12/7/2021    IR NEPHROSTOMY EXCHANGE CATHETER  11/3/2021    IR NEPHROSTOMY EXCHANGE CATHETER  10/28/2021    IR NEPHROSTOMY PERCUTANEOUS LEFT  9/16/2021    IR NEPHROSTOMY PERCUTANEOUS LEFT  9/16/2021    IR NEPHROSTOMY PERCUTANEOUS LEFT 9/16/2021 [facility] SPECI[facility]    IR REPLCE T CVC WO PORT SAME ACC  5/30/2019    IR TUBE CHANGE  8/5/2021    IR TUBE CHANGE  7/8/2021    IR TUBE CHANGE  6/14/2021    IR TUBE CHANGE  5/27/2021    IR TUNNELED CATHETER PLACEMENT GREATER THAN 5 YEARS  5/20/2019    IR TUNNELED CATHETER PLACEMENT GREATER THAN 5 YEARS  5/20/2019    IR TUNNELED CATHETER PLACEMENT GREATER THAN 5 YEARS 5/20/2019 [facility] SPE[facility]    OTHER SURGICAL HISTORY Bilateral     cataracts  2017    OTHER SURGICAL HISTORY  10/7/2013 Roberto---endorectal us    POLYPECTOMY      TOTAL COLECTOMY  11/2013    Roberto--lap LAR with coloproctostomy, mobilization splenic flexure, diverting loop ileostomy    TOTAL COLECTOMY Right 8/2014    for leak    VASCULAR SURGERY Left 4/14    single lumen port/subsequently removed     Current Facility-Administered Medications   Medication Dose Route Frequency    0.9 % sodium chloride bolus  100 mL IntraVENous Once    sodium chloride flush 0.9 % injection 5-40 mL  5-40 mL IntraVENous 2 times per day    sodium chloride flush 0.9 % injection 5-40 mL  5-40 mL IntraVENous PRN    0.9 % sodium chloride infusion   IntraVENous PRN    ondansetron (ZOFRAN-ODT) disintegrating tablet 4 mg  4 mg Oral Q8H PRN    Or    ondansetron (ZOFRAN) injection 4 mg  4 mg IntraVENous Q6H PRN    polyethylene glycol (GLYCOLAX) packet 17 g  17 g Oral Daily PRN    acetaminophen (TYLENOL) tablet 650 mg  650 mg Oral Q6H PRN    Or    acetaminophen (TYLENOL) suppository 650 mg  650 mg Rectal Q6H PRN    pantoprazole (PROTONIX) 40 mg in sodium chloride (PF) 0.9 % 10 mL injection  40 mg IntraVENous Q12H    lactated ringers infusion   IntraVENous Continuous    [START ON 11/15/2022] cefTRIAXone (ROCEPHIN) 1,000 mg in sodium chloride 0.9 % 50 mL IVPB mini-bag  1,000 mg IntraVENous Q24H    polyethylene glycol (GLYCOLAX) packet 17 g  17 g Oral BID     Current Outpatient Medications   Medication Sig    ciprofloxacin (CIPRO) 500 MG tablet Take 1 tablet by mouth 2 times daily for 10 days    amitriptyline (ELAVIL) 50 MG tablet Take 1 tablet by mouth nightly    clonazePAM (KLONOPIN) 2 MG tablet Take 2 tablets by mouth nightly as needed for Anxiety for up to 3 days.     carvedilol (COREG) 6.25 MG tablet Take 1 tablet by mouth 2 times daily (with meals)    acetaminophen (TYLENOL) 500 MG tablet Take 500 mg by mouth every 6 hours as needed for Pain    ibuprofen (ADVIL;MOTRIN) 600 MG tablet Take 600 mg by mouth every 6 hours as needed for Pain    levothyroxine (SYNTHROID) 125 MCG tablet TAKE ONE TABLET BY MOUTH ONE TIME DAILY BEFORE BREAKFAST    OLANZapine (ZYPREXA) 2.5 MG tablet Take 7.5 mg by mouth nightly     Patient has no known allergies. Social History     Socioeconomic History    Marital status: Single     Spouse name: None    Number of children: None    Years of education: None    Highest education level: None   Tobacco Use    Smoking status: Former     Packs/day: 1.00     Types: Cigarettes     Quit date: 1963     Years since quittin.0    Smokeless tobacco: Never   Substance and Sexual Activity    Alcohol use: Not Currently     Alcohol/week: 11.7 standard drinks    Drug use: No     Social History     Tobacco Use   Smoking Status Former    Packs/day: 1.00    Types: Cigarettes    Quit date: 1963    Years since quittin.0   Smokeless Tobacco Never     Family History   Problem Relation Age of Onset    Cancer Mother         colon and ovarian    Cancer Brother         prostate    Alcohol Abuse Brother     Cancer Maternal Grandmother         bone    Alcohol Abuse Father     Stroke Paternal Grandfather     Alcohol Abuse Sister      ROS: The patient has no difficulty with chest pain or shortness of breath. No fever or chills. Comprehensive review of systems was otherwise unremarkable except as noted above. Physical Exam:   /82   Pulse 76   Temp 97.9 °F (36.6 °C) (Oral)   Resp 13   Ht 5' 8.5\" (1.74 m)   Wt 125 lb (56.7 kg)   SpO2 97%   BMI 18.73 kg/m²   Constitutional: Alert, oriented, cooperative patient in no acute distress; appears stated age    Eyes:Sclera are clear. EOMs intact  ENMT: no external lesions gross hearing normal; no obvious neck masses, no ear or lip lesions, nares normal  CV: RRR. Normal perfusion  Resp: No JVD. Breathing is  non-labored; no audible wheezing. GI: with ileostomy-stoma with pink healthy appearance. mildly distended. Active BS. Denies abdominal pain at time of consult.    Musculoskeletal: unremarkable with normal function. No embolic signs or cyanosis. Neuro:  Oriented; moves all 4; no focal deficits  Psychiatric: normal affect and mood, no memory impairment    Recent vitals (if inpt):  Patient Vitals for the past 24 hrs:   BP Temp Temp src Pulse Resp SpO2 Height Weight   11/14/22 1419 -- -- -- 76 13 -- -- --   11/14/22 1128 -- -- -- 79 13 97 % -- --   11/14/22 1015 138/82 -- -- 72 16 99 % -- --   11/14/22 0800 134/77 -- -- -- -- 94 % -- --   11/14/22 0603 (!) 164/87 -- -- -- -- 96 % -- --   11/14/22 0548 (!) 156/88 -- -- -- -- 98 % -- --   11/14/22 0423 -- -- -- -- -- 99 % -- --   11/14/22 0420 (!) 163/84 97.9 °F (36.6 °C) Oral 91 18 -- 5' 8.5\" (1.74 m) 125 lb (56.7 kg)       Labs:  Recent Labs     11/14/22 0428   WBC 7.5   HGB 11.7*         K 4.2      CO2 26   BUN 27*   ALT 33       Lab Results   Component Value Date/Time    WBC 7.5 11/14/2022 04:28 AM    HGB 11.7 11/14/2022 04:28 AM     11/14/2022 04:28 AM     11/14/2022 04:28 AM    K 4.2 11/14/2022 04:28 AM     11/14/2022 04:28 AM    CO2 26 11/14/2022 04:28 AM    BUN 27 11/14/2022 04:28 AM    INR 1.0 09/15/2021 07:45 PM    APTT 28.1 08/27/2021 04:30 PM    ALT 33 11/14/2022 04:28 AM     HISTORY:  concern for sbo, right lower quadrant abdominal pain. History of bowel   obstruction. 10/14/22 08:18       COMPARISON: 7/7/2022       EXAM: CT abdomen and pelvis with iv contrast       TECHNIQUE:    Thin section axial CT was performed from the lung bases through the symphysis   pubis during uneventful rapid bolus intravenous administration of 125 mL of   Isovue 370. Oral contrast was also administered. Radiation dose reduction   techniques were used for this study. Our CT scanners use one or all of the   following: Automated exposure control, adjustment of the mA and/or kV according   to patient size, use of iterative reconstruction. FINDINGS:       There is bibasilar scarring present. CT abdomen:  The liver and spleen enhances homogeneously without discrete   lesions. There is no biliary ductal dilatation. The gallbladder, pancreas and   adrenal glands are normal. There is a right-sided ureteral stent present with   persistent moderate right-sided hydronephrosis. . Multiple dilated loops of small   bowel are present with \"small bowel feces sign\" within the central abdomen. There are decompressed loops of small bowel within the left midabdomen. No   definite upper abdominal lymphadenopathy seen. CT pelvis: There is a John catheter in the bladder. The rectum is normal. No   pelvic adenopathy seen. There is small pelvic free fluid present. There is   presacral soft tissue prominence, as was seen previously. There is a left lower   quadrant ostomy. Bone window evaluation demonstrates no aggressive osseous lesions. Impression       1. Findings compatible with small bowel obstruction. Multiple dilated loops of   small bowel seen with a \"small bowel feces sign\" within the central abdomen. There are some decompressed loops of bowel within the left midabdomen, though a   distinct transition point is not identified. 2. Findings are otherwise unchanged compared with the reference exam.         I reviewed recent labs and recent radiologic studies. ASSESSMENT/PLAN:    Principal Problem:    SBO (small bowel obstruction) (HCC)  Active Problems:    Mild episode of recurrent major depressive disorder (HCC)    Colostomy complication, unspecified (HCC)    Rectal cancer ([address]    Hypothyroidism    Urinary tract infection associated with indwelling urethral catheter ([address]  Resolved Problems:    * No resolved hospital problems.  *       Attending care per hospitalist    Miralax BID-clamp for miralax   Continue NGT LIS  Further input per attending surgeon    Signed:  GERMAN Manzo - JOHNSON

## 2022-11-14 NOTE — ACP (ADVANCE CARE PLANNING)
[facility] Hospitalist Service  At the heart of better care     Advance Care Planning   Admit Date:  2022  5:41 AM   Name:  Yosvany Nolasco   Age:  76 y.o. Sex:  male  :  1945   MRN:  [id number]   Room:  [address] [place] [address] is able to make his own decisions:   YEs    If pt unable to make decisions, POA/surrogate decision maker:  Abdoulaye Gayle    Other people present:   NA    Patient / surrogate decision-maker directed code status:  Full code     Other ACP topics discussed, if applicable:   NPO, surgery consult    Patient or surrogate consented to discussion of the current conditions, workup, management plans, prognosis, and the risk for further deterioration. Time spent: 30 minutes in direct discussion.       Signed:  GERMAN Ruiz CNP

## 2022-11-14 NOTE — ED TRIAGE NOTES
Pt presents from [facility] reports that he has not had any ostomy output since yesterday morning, generalized RLQ abd pain. Afebrile, EMS vitals; 170/100, 98%, HR 92. Reports hx of bowel obstruction, denies vomiting.

## 2022-11-14 NOTE — CARE COORDINATION
Chart review complete, CM consult noted, CM met with pt at bedside, pt found laying in bed c/o pain, pt noted with NG tube to LIS, pt states he was recently in [facility] and was DC to [facility] just went home from STR about 1 week ago. States has been going to outpt therapy but feels weaker and agreeable to STR if needed. Demographics, insurance and PCP confirmed. PT/OT evaluations pending, PPD requested just in case STR needed. CM staff will remain available to assist as needed with dc needs. 11/14/22 2085   Service Assessment   Patient Orientation Alert and Oriented   Cognition Alert   History Provided By Patient   Primary Caregiver Self   Accompanied By/Relationship none   Support Systems Friends/Neighbors   Patient's Healthcare Decision Maker is: Legal Next of Viv 67   PCP Verified by CM Yes  (Bucky Armenta DO had appointment after recent admission but did not go states its been greater than 6mths since last MD visit.)   Last Visit to PCP Within last 6 months   Prior Functional Level Independent in ADLs/IADLs   Current Functional Level Assistance with the following:   Can patient return to prior living arrangement Yes   Ability to make needs known: Good   Family able to assist with home care needs: Yes   Would you like for me to discuss the discharge plan with any other family members/significant others, and if so, who?  No   Financial Resources [employer]   (outpt PT at [address] therapy @[place])   Social/Functional History   Lives With Alone   Type of Home Apartment  (Town home)   Home Layout Two level   Home Access Level entry   [address]   [address]   (none)   ADL Assistance Independent   Homemaking Assistance Independent   Ambulation Assistance Independent   Transfer Assistance Independent   Active  Yes   Mode of Transportation Car   Occupation Retired   Discharge Planning   Type of Residence [address] Prior To Admission Other (Comment)  (outpt PT)   [address]   DME Ordered?  No   Potential Assistance Purchasing Medications No   Type of Home Care Services None   Patient expects to be discharged to: Skilled nursing facility   History of falls? 0

## 2022-11-14 NOTE — ED PROVIDER NOTES
Emergency Department Provider Note                   PCP:                Gill May DO               Age: 76 y.o. Sex: male       ICD-10-CM    1. Small bowel obstruction ([facility] [address]  K56.609           DISPOSITION Decision To Admit 11/14/2022 09:05:35 AM        MDM  Number of Diagnoses or Management Options  Small bowel obstruction [facility]): new, needed workup  Diagnosis management comments: Lab work thus far is reassuring. X-ray imaging obtained on arrival shows distention of the small bowel, I have concern for underlying obstruction. Will obtain CT imaging. Labs thus far are stable. CT imaging confirms small bowel obstruction  I have consulted wound with the hospitalist service for primary admission and surgical service for evaluation. Patient has been updated plan of care for admission.        Amount and/or Complexity of Data Reviewed  Clinical lab tests: ordered and reviewed  Tests in the radiology section of CPT®: ordered and reviewed  Tests in the medicine section of CPT®: ordered and reviewed  Discuss the patient with other providers: yes  Independent visualization of images, tracings, or specimens: yes    Risk of Complications, Morbidity, and/or Mortality  Presenting problems: high  Diagnostic procedures: high  Management options: high    Patient Progress  Patient progress: stable             Orders Placed This Encounter   Procedures    Culture, Blood 1    Culture, Blood 1    XR ABDOMEN (KUB) (SINGLE AP VIEW)    CT ABDOMEN PELVIS W IV CONTRAST Additional Contrast? None    CBC with Diff    CMP    Lipase    Urinalysis w rflx microscopic    Diet NPO    POCT Urine Dipstick    POCT Urinalysis no Micro    Saline lock IV        Medications   0.9 % sodium chloride bolus (has no administration in time range)   cefTRIAXone (ROCEPHIN) 1,000 mg in sodium chloride 0.9 % 50 mL IVPB mini-bag (has no administration in time range)   diatrizoate meglumine-sodium (GASTROGRAFIN) 66-10 % solution 20 mL (20 mLs Oral Given 11/14/22 0657)   morphine injection 4 mg (4 mg IntraVENous Given 11/14/22 0658)   ondansetron (ZOFRAN) injection 4 mg (4 mg IntraVENous Given 11/14/22 0658)   iopamidol (ISOVUE-370) 76 % injection 100 mL (100 mLs IntraVENous Given 11/14/22 0806)       New Prescriptions    No medications on file        Doris Douglass is a 76 y.o. male who presents to the Emergency Department with chief complaint of  No chief complaint on file. 64-year-old male patient with history of rectal cancer status post colostomy placement presents to this department with reports of decreased colostomy output for the past 24 hours. Patient states symptoms started abruptly while he was at home. He reports intermittent abdominal cramping generalized to his abdomen. There is no associated alleviating or exacerbating factors. Patient reports nausea without vomiting. He has been able to eat and drink like normal.  He reports normal urinary output. Patient reports history of small bowel obstruction in the past.  He has noted bloating and distention of the abdomen as well. Patient relates a history of rectal cancer requiring colectomy secondary to dead bowel. The history is provided by the patient. No  was used. Review of Systems   Constitutional:  Negative for chills and fatigue. HENT:  Negative for congestion. Eyes:  Negative for visual disturbance. Respiratory:  Negative for cough, chest tightness, shortness of breath and wheezing. Cardiovascular:  Negative for chest pain and leg swelling. Gastrointestinal:  Positive for abdominal distention, abdominal pain, constipation and nausea. Negative for vomiting. Genitourinary:  Negative for difficulty urinating, dysuria, flank pain and hematuria. Musculoskeletal:  Negative for back pain, neck pain and neck stiffness. Skin:  Negative for color change. Neurological:  Negative for dizziness, light-headedness, numbness and headaches. All other systems reviewed and are negative.     Past Medical History:   Diagnosis Date    Acute deep vein thrombosis (DVT) of non-extremity vein 5/20/2019    Family history of malignant neoplasm of gastrointestinal tract     mother colon/ovarian cancer 67    Fecal incontinence     LAR syndrome    John catheter in place 2022    patient stated- \"catheter due to them nicking his bladder and it won't heal\"    Former cigarette smoker     History of rectal cancer     Hypothyroid     stable w/med    Infection and inflammatory reaction due to other internal prosthetic devices, implants and grafts, subsequent encounter 2017    abd wound/mesh colostomy    Insomnia     takes meds    Major depression     Osteoarthritis, hand     Personal history of colonic polyps 9/2013    x 1    Personal history of malignant neoplasm of rectum, rectosigmoid junction, 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    surgery and radiation    Psychiatric disorder     anxiety        Past Surgical History:   Procedure Laterality Date    COLONOSCOPY  last 2/3/15    Roberto--no polyps--3 year recall    COLONOSCOPY N/A 3/5/2021    COLONOSCOPY/BMI 19 performed by Rosa Porras MD at [facility] N/A 2/12/2018    COLONOSCOPY / BMI=21 performed by Yoan Pablo MD at [address] [place] [address]  2/12/2018         COLONOSCOPY THRU STOMA,LESN RMVL W/SNARE  3/5/2021         COLOSTOMY  5/11/16    CT RETROPERITONEAL PERC DRAIN  5/13/2021    CT RETROPERITONEAL PERC DRAIN 5/13/2021 [facility] CT SCAN    CT RETROPERITONEAL PERC DRAIN  5/20/2021    CT RETROPERITONEAL PERC DRAIN 5/20/2021 [facility] CT SCAN    GI  4/2016    Sacral nerve stimlator lead trial (unsucessful) and removal    HERNIA REPAIR      and Colostomy in May    HERNIA REPAIR  3/2015, 5/2016    IR ABSCESS EVAL THRU EXISTING CATH  11/24/2021    IR ABSCESS EVAL THRU EXISTING CATH  6/4/2021    IR ABSCESS EVAL THRU EXISTING CATH  5/20/2021    IR NEPHROSTOGRAM EXISTING ACCESS  12/10/2021    IR NEPHROSTOGRAM EXISTING ACCESS  10/4/2021    IR NEPHROSTOMY EXCHANGE CATHETER  2021    IR NEPHROSTOMY EXCHANGE CATHETER  11/3/2021    IR NEPHROSTOMY EXCHANGE CATHETER  10/28/2021    IR NEPHROSTOMY PERCUTANEOUS LEFT  2021    IR NEPHROSTOMY PERCUTANEOUS LEFT  2021    IR NEPHROSTOMY PERCUTANEOUS LEFT 2021 [facility]    IR REPLCE T CVC WO PORT SAME ACC  2019    IR TUBE CHANGE  2021    IR TUBE CHANGE  2021    IR TUBE CHANGE  2021    IR TUBE CHANGE  2021    IR TUNNELED CATHETER PLACEMENT GREATER THAN 5 YEARS  2019    IR TUNNELED CATHETER PLACEMENT GREATER THAN 5 YEARS  2019    IR TUNNELED CATHETER PLACEMENT GREATER THAN 5 YEARS 2019 [facility]    OTHER SURGICAL HISTORY Bilateral     cataracts      OTHER SURGICAL HISTORY  10/7/2013    Roberto---endorectal us    POLYPECTOMY      TOTAL COLECTOMY  2013    Roberto--lap LAR with coloproctostomy, mobilization splenic flexure, diverting loop ileostomy    TOTAL COLECTOMY Right 2014    for leak    VASCULAR SURGERY Left     single lumen port/subsequently removed        Family History   Problem Relation Age of Onset    Cancer Mother         colon and ovarian    Cancer Brother         prostate    Alcohol Abuse Brother     Cancer Maternal Grandmother         bone    Alcohol Abuse Father     Stroke Paternal Grandfather     Alcohol Abuse Sister         Social History     Socioeconomic History    Marital status: Single     Spouse name: None    Number of children: None    Years of education: None    Highest education level: None   Tobacco Use    Smoking status: Former     Packs/day: 1.00     Types: Cigarettes     Quit date: 1963     Years since quittin.0    Smokeless tobacco: Never   Substance and Sexual Activity    Alcohol use: Not Currently     Alcohol/week: 11.7 standard drinks    Drug use:  No Patient has no known allergies. Previous Medications    ACETAMINOPHEN (TYLENOL) 500 MG TABLET    Take 500 mg by mouth every 6 hours as needed for Pain    AMITRIPTYLINE (ELAVIL) 50 MG TABLET    Take 1 tablet by mouth nightly    CARVEDILOL (COREG) 6.25 MG TABLET    Take 1 tablet by mouth 2 times daily (with meals)    CIPROFLOXACIN (CIPRO) 500 MG TABLET    Take 1 tablet by mouth 2 times daily for 10 days    CLONAZEPAM (KLONOPIN) 2 MG TABLET    Take 2 tablets by mouth nightly as needed for Anxiety for up to 3 days. IBUPROFEN (ADVIL;MOTRIN) 600 MG TABLET    Take 600 mg by mouth every 6 hours as needed for Pain    LEVOTHYROXINE (SYNTHROID) 125 MCG TABLET    TAKE ONE TABLET BY MOUTH ONE TIME DAILY BEFORE BREAKFAST    OLANZAPINE (ZYPREXA) 2.5 MG TABLET    Take 7.5 mg by mouth nightly        Vitals signs and nursing note reviewed. Patient Vitals for the past 4 hrs:   BP SpO2   11/14/22 0800 134/77 94 %   11/14/22 0603 (!) 164/87 96 %   11/14/22 0548 (!) 156/88 98 %          Physical Exam  Vitals and nursing note reviewed. Constitutional:       General: He is not in acute distress. Appearance: Normal appearance. He is normal weight. He is not ill-appearing or toxic-appearing. Comments: Generally well-appearing, alert and oriented x4. No acute distress, speaks in clear, fluid sentences. HENT:      Head: Normocephalic and atraumatic. Right Ear: External ear normal.      Left Ear: External ear normal.      Nose: Nose normal.      Mouth/Throat:      Mouth: Mucous membranes are moist.   Eyes:      General: No scleral icterus. Right eye: No discharge. Left eye: No discharge. Extraocular Movements: Extraocular movements intact. Cardiovascular:      Rate and Rhythm: Normal rate and regular rhythm. Pulses: Normal pulses. Heart sounds: Normal heart sounds.    Pulmonary:      Effort: Pulmonary effort is normal. No tachypnea, bradypnea, accessory muscle usage, prolonged expiration or respiratory distress. Breath sounds: Normal breath sounds and air entry. No stridor. No decreased breath sounds, wheezing, rhonchi or rales. Abdominal:      General: Abdomen is flat. Bowel sounds are decreased. There is distension. Palpations: Abdomen is soft. There is no mass. Tenderness: There is generalized abdominal tenderness. There is no right CVA tenderness, left CVA tenderness, guarding or rebound. Negative signs include Stephen's sign and McBurney's sign. Hernia: No hernia is present. Comments: The abdomen is somewhat firm to palpation with distention noted. No significant rebound or guarding exam.  Bowel sounds are diminished throughout. Minimal stool in patient's colostomy bag. Musculoskeletal:         General: No swelling, tenderness or deformity. Normal range of motion. Cervical back: Normal range of motion. Skin:     General: Skin is warm. Capillary Refill: Capillary refill takes less than 2 seconds. Neurological:      General: No focal deficit present. Mental Status: He is alert. Psychiatric:         Mood and Affect: Mood normal.        Procedures    Results for orders placed or performed during the hospital encounter of 11/14/22   XR ABDOMEN (KUB) (SINGLE AP VIEW)    Narrative    EXAMINATION: Abdomen. HISTORY: 7/8/2022    TECHNIQUE: Single frontal view of the abdomen. COMPARISON: None available. FINDINGS:   No significant change in the appearance of the right ureteral stent. There appears to be a bladder catheter although it is different from the  previous catheter. There are dilated loops of bowel noted in the right abdomen. It is difficult to  differentiate between large and small bowel. Stool is seen in the left upper quadrant. An ostomy is seen over the left lower quadrant. No definitive evidence of free air. Osseous structures and soft tissues appear within normal limits.       Impression    There are dilated loops of bowel noted in the right abdomen. It is difficult to  differentiate between large and small bowel. Bowel obstruction is not excluded. Stool is seen in the left upper quadrant. CT ABDOMEN PELVIS W IV CONTRAST Additional Contrast? None    Narrative    HISTORY:  concern for sbo, right lower quadrant abdominal pain. History of bowel  obstruction. COMPARISON: 7/7/2022    EXAM: CT abdomen and pelvis with iv contrast    TECHNIQUE:   Thin section axial CT was performed from the lung bases through the symphysis  pubis during uneventful rapid bolus intravenous administration of 125 mL of  Isovue 370. Oral contrast was also administered. Radiation dose reduction  techniques were used for this study. Our CT scanners use one or all of the  following: Automated exposure control, adjustment of the mA and/or kV according  to patient size, use of iterative reconstruction. FINDINGS:    There is bibasilar scarring present. CT abdomen: The liver and spleen enhances homogeneously without discrete  lesions. There is no biliary ductal dilatation. The gallbladder, pancreas and  adrenal glands are normal. There is a right-sided ureteral stent present with  persistent moderate right-sided hydronephrosis. . Multiple dilated loops of small  bowel are present with \"small bowel feces sign\" within the central abdomen. There are decompressed loops of small bowel within the left midabdomen. No  definite upper abdominal lymphadenopathy seen. CT pelvis: There is a John catheter in the bladder. The rectum is normal. No  pelvic adenopathy seen. There is small pelvic free fluid present. There is  presacral soft tissue prominence, as was seen previously. There is a left lower  quadrant ostomy. Bone window evaluation demonstrates no aggressive osseous lesions. Impression    1. Findings compatible with small bowel obstruction.  Multiple dilated loops of  small bowel seen with a \"small bowel feces sign\" within the central abdomen. There are some decompressed loops of bowel within the left midabdomen, though a  distinct transition point is not identified.   2. Findings are otherwise unchanged compared with the reference exam.         CBC with Diff   Result Value Ref Range    WBC 7.5 4.3 - 11.1 K/uL    RBC 4.01 (L) 4.23 - 5.6 M/uL    Hemoglobin 11.7 (L) 13.6 - 17.2 g/dL    Hematocrit 37.3 (L) 41.1 - 50.3 %    MCV 93.0 82 - 102 FL    MCH 29.2 26.1 - 32.9 PG    MCHC 31.4 31.4 - 35.0 g/dL    RDW 15.8 (H) 11.9 - 14.6 %    Platelets 517 142 - 611 K/uL    MPV 9.4 9.4 - 12.3 FL    nRBC 0.00 0.0 - 0.2 K/uL    Differential Type AUTOMATED      Seg Neutrophils 84 (H) 43 - 78 %    Lymphocytes 7 (L) 13 - 44 %    Monocytes 7 4.0 - 12.0 %    Eosinophils % 1 0.5 - 7.8 %    Basophils 1 0.0 - 2.0 %    Immature Granulocytes 0 0.0 - 5.0 %    Segs Absolute 6.4 1.7 - 8.2 K/UL    Absolute Lymph # 0.5 0.5 - 4.6 K/UL    Absolute Mono # 0.5 0.1 - 1.3 K/UL    Absolute Eos # 0.1 0.0 - 0.8 K/UL    Basophils Absolute 0.0 0.0 - 0.2 K/UL    Absolute Immature Granulocyte 0.0 0.0 - 0.5 K/UL   CMP   Result Value Ref Range    Sodium 136 133 - 143 mmol/L    Potassium 4.2 3.5 - 5.1 mmol/L    Chloride 104 101 - 110 mmol/L    CO2 26 21 - 32 mmol/L    Anion Gap 6 2 - 11 mmol/L    Glucose 121 (H) 65 - 100 mg/dL    BUN 27 (H) 8 - 23 MG/DL    Creatinine 1.50 0.8 - 1.5 MG/DL    Est, Glom Filt Rate 47 (L) >60 ml/min/1.73m2    Calcium 9.1 8.3 - 10.4 MG/DL    Total Bilirubin 0.4 0.2 - 1.1 MG/DL    ALT 33 12 - 65 U/L    AST 28 15 - 37 U/L    Alk Phosphatase 67 50 - 136 U/L    Total Protein 7.5 6.3 - 8.2 g/dL    Albumin 3.3 3.2 - 4.6 g/dL    Globulin 4.2 2.8 - 4.5 g/dL    Albumin/Globulin Ratio 0.8 0.4 - 1.6     Lipase   Result Value Ref Range    Lipase 164 73 - 393 U/L   Urinalysis w rflx microscopic   Result Value Ref Range    Color, UA YELLOW/STRAW      Appearance TURBID      Specific Gravity, UA 1.012 1.001 - 1.023      pH, Urine 5.0 5.0 - 9.0      Protein, UA 100 (A) NEG mg/dL    Glucose, UA Negative mg/dL    Ketones, Urine Negative NEG mg/dL    Bilirubin Urine Negative NEG      Blood, Urine LARGE (A) NEG      Urobilinogen, Urine 0.2 0.2 - 1.0 EU/dL    Nitrite, Urine Negative NEG      Leukocyte Esterase, Urine LARGE (A) NEG      WBC, UA >100 0 /hpf    RBC, UA  0 /hpf    Epithelial Cells UA 0-3 0 /hpf    BACTERIA, URINE 3+ (H) 0 /hpf    Yeast, UA MODERATE      Other observations RESULTS VERIFIED MANUALLY     POCT Urinalysis no Micro   Result Value Ref Range    Specific Gravity, Urine, POC 1.025 (H) 1.001 - 1.023      pH, Urine, POC 5.5 5.0 - 9.0      Protein, Urine,  (A) NEG mg/dL    Glucose, UA POC Negative NEG mg/dL    Ketones, Urine, POC Negative NEG mg/dL    Bilirubin, Urine, POC Negative NEG      Blood, UA POC LARGE (A) NEG      URINE UROBILINOGEN POC 0.2 0.2 - 1.0 EU/dL    Nitrate, Urine, POC Negative NEG      Leukocyte Est, UA POC LARGE (A) NEG          CT ABDOMEN PELVIS W IV CONTRAST Additional Contrast? None   Final Result      1. Findings compatible with small bowel obstruction. Multiple dilated loops of   small bowel seen with a \"small bowel feces sign\" within the central abdomen. There are some decompressed loops of bowel within the left midabdomen, though a   distinct transition point is not identified. 2. Findings are otherwise unchanged compared with the reference exam.               XR ABDOMEN (KUB) (SINGLE AP VIEW)   Final Result   There are dilated loops of bowel noted in the right abdomen. It is difficult to   differentiate between large and small bowel. Bowel obstruction is not excluded. Stool is seen in the left upper quadrant. Voice dictation software was used during the making of this note. This software is not perfect and grammatical and other typographical errors may be present. This note has not been completely proofread for errors.      Gaby Pulido DO  11/14/22 [id number] Rigoberto Villalobos, DO  11/14/22 [id number]       Rigoberto Villalobos, DO  11/21/22 1017

## 2022-11-14 NOTE — PROGRESS NOTES
ACUTE PHYSICAL THERAPY GOALS:   (Developed with and agreed upon by patient and/or caregiver. )  LTG:  (1.)Mr. Nolasco will move from supine to sit and sit to supine , scoot up and down, and roll side to side in bed with INDEPENDENCE within 5 treatment day(s). (2.)Mr. Nolasco will transfer from bed to chair and chair to bed with INDEPENDENCE using the least restrictive device within 7 treatment day(s). (3.)Mr. Nolasco will ambulate with INDEPENDENCE for a minimum of 500 feet with the least restrictive device within 7 treatment day(s). (4.)Mr. Nolasco will tolerate 25+ minutes of therapeutic exercise in order to demonstrate improved activity tolerance within 7 treatment days. ________________________________________________________________________________________________     PHYSICAL THERAPY Initial Assessment and Daily Note  (Link to Caseload Tracking: PT Visit Days : 1  Acknowledge Orders  Time In/Out  PT Charge Capture  Rehab Caseload Tracker    Jethro Guerrier is a 76 y.o. male   PRIMARY DIAGNOSIS: SBO (small bowel obstruction) ([facility])  SBO (small bowel obstruction) ([facility] [address] [K56.609]       Reason for Referral: Generalized Muscle Weakness (M62.81)  Other abnormalities of gait and mobility (R26.89)  Inpatient: Payor: MEDICARE / Plan: MEDICARE PART A AND B / Product Type: *No Product type* /     ASSESSMENT:     REHAB RECOMMENDATIONS:   Recommendation to date pending progress:  Setting:  Home Health Therapy vs no needs     Equipment:    To Be Determined     ASSESSMENT:  Mr. Uzma Lewis presents with a SBO and a recent DC from [facility] last week after his last hospital stay. Today he presents with a decrease in strength and mobility compared to his baseline. Pt states he has noticed that if he is unable to get up and move he declines rapidly. Today he was able to ambulate 250' with CGA-SBA and no AD. He continues to demonstrate an abnormal gait pattern but has improved stability and balance since his last admission.  Pt is functioning below his baseline at this time and would continue to benefit from skilled acute care PT to facilitate improvements in the above stated deficits. HHPT recommended at [place] pending pt hospital stay.       [address] [facility] [address] [address] AM-PAC 6 Clicks Basic Mobility Inpatient Short Form  AM-PAC Mobility Inpatient   How much difficulty turning over in bed?: None  How much difficulty sitting down on / standing up from a chair with arms?: None  How much difficulty moving from lying on back to sitting on side of bed?: A Little  How much help from another person moving to and from a bed to a chair?: None  How much help from another person needed to walk in hospital room?: A Little  How much help from another person for climbing 3-5 steps with a railing?: A Little  AM-PAC Inpatient Mobility Raw Score : 21  AM-PAC Inpatient T-Scale Score : 50.25  Mobility Inpatient CMS 0-100% Score: 28.97  Mobility Inpatient CMS G-Code Modifier : CJ    SUBJECTIVE:   Mr. Precious Horowitz states, \"I just don't want to not be able to get up and move and regress in all the progress I made\"     Social/Functional Lives With: Alone  Type of Home: Apartment ([address] [place] [address])  Home Layout: Two level  Home Access: Level entry  Bathroom Shower/Tub: Tub/Shower unit  Bathroom Toilet: Standard  Home Equipment:  (none)  ADL Assistance: Independent  Homemaking Assistance: Independent  Ambulation Assistance: Independent  Transfer Assistance: Independent  Active : Yes  Mode of Transportation: Car  Occupation: Retired    OBJECTIVE:     PAIN: Graceann Bunk / O2: PRECAUTION / Latrell Romero / Michael Jackson:   Pre Treatment:   Pain Assessment: None - Denies Pain      Post Treatment: 0/10 Vitals        Oxygen      Colostomy, Continuous Pulse Oximetry, John Catheter, IV, Nasogastric Tube, and Telemetry     RESTRICTIONS/PRECAUTIONS:                    GROSS EVALUATION: Intact Impaired (Comments):   AROM [x]     PROM []    Strength [x]     Balance [] Posture: Good  Sitting - Static: Good  Sitting - Dynamic: Good  Standing - Static: Fair, +  Standing - Dynamic: Fair, +   Posture [] N/A   Sensation [x]     Coordination []      Tone []     Edema []    Activity Tolerance [] Patient Tolerated treatment well    []      COGNITION/  PERCEPTION: Intact Impaired (Comments):   Orientation [x]     Vision [x]     Hearing [x]     Cognition  [x]       MOBILITY: I Mod I S SBA CGA Min Mod Max Total  NT x2 Comments:   Bed Mobility    Rolling [] [] [] [] [] [] [] [] [] [x] []    Supine to Sit [] [] [] [] [] [] [] [] [] [x] []    Scooting [] [] [] [] [x] [x] [] [] [] [] [] Pt scooted down to the end of the stretcher with SBA and use of bed rails and back up with assistance   Sit to Supine [] [] [] [] [] [] [] [] [] [x] []    Transfers    Sit to Stand [] [] [] [x] [] [] [] [] [] [] []    Bed to Chair [] [] [] [] [] [] [] [] [] [x] []    Stand to Sit [] [] [] [x] [] [] [] [] [] [] []     [] [] [] [] [] [] [] [] [] [] []    I=Independent, Mod I=Modified Independent, S=Supervision, SBA=Standby Assistance, CGA=Contact Guard Assistance,   Min=Minimal Assistance, Mod=Moderate Assistance, Max=Maximal Assistance, Total=Total Assistance, NT=Not Tested    GAIT: I Mod I S SBA CGA Min Mod Max Total  NT x2 Comments:   Level of Assistance [] [] [] [x] [x] [] [] [] [] [] []    Distance 250 feet    DME None    Gait Quality Trunk sway increased, Wide base of support, and abnormal gait with increased knee and hip flexion bilaterally    Weightbearing Status      Stairs      I=Independent, Mod I=Modified Independent, S=Supervision, SBA=Standby Assistance, CGA=Contact Guard Assistance,   Min=Minimal Assistance, Mod=Moderate Assistance, Max=Maximal Assistance, Total=Total Assistance, NT=Not Tested    PLAN:   FREQUENCY AND DURATION: 3 times/week for duration of hospital stay or until stated goals are met, whichever comes first.    THERAPY PROGNOSIS: Excellent    PROBLEM LIST:   (Skilled intervention is medically necessary to address:)  Decreased Balance  Decreased Gait Ability  Decreased Strength  Decreased Transfer Abilities INTERVENTIONS PLANNED:   (Benefits and precautions of physical therapy have been discussed with the patient.)  Therapeutic Activity  Therapeutic Exercise/HEP  Neuromuscular Re-education  Gait Training       TREATMENT:   EVALUATION: LOW COMPLEXITY: (Untimed Charge)    TREATMENT:   Therapeutic Activity (10 Minutes): Therapeutic activity included Scooting, Transfer Training, Ambulation on level ground, Sitting balance , and Standing balance to improve functional Activity tolerance, Balance, Coordination, Mobility, and Strength.     TREATMENT GRID:  N/A    AFTER TREATMENT PRECAUTIONS: Bed, Bed/Chair Locked, Call light within reach, Needs within reach, and RN notified    INTERDISCIPLINARY COLLABORATION:  RN/ PCT and PT/ PTA    EDUCATION: Education Given To: Patient  Education Provided: Role of Therapy;Plan of Care  Education Method: Verbal  Barriers to Learning: None  Education Outcome: Verbalized understanding    TIME IN/OUT:  Time In: 1452  Time Out: 8[address]  Minutes: [address] [place], [place]

## 2022-11-14 NOTE — H&P
Hospitalist History and Physical   Admit Date:  2022  5:41 AM   Name:  Bhargav Nolasco   Age:  76 y.o. Sex:  male  :  1945   MRN:  [id number]   Room:  ER15/15    Presenting Complaint: No chief complaint on file. Reason(s) for Admission: SBO (small bowel obstruction) ([facility] [address] [K56.609]     History of Present Illness:   Brigette Grullon is a 76 y.o. 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 He was recently hospitalized 10/16-10/27 for LE weakness, received IVIG and followed by neuro, he was [facility]'d [facility] from there 11/10 in good health. CT A/P consistent with SBO. 2 sets blood cultures drawn in ED, noted with large leukocytes in urine, started on Rocephin and urine culture ordered. Of note, he is noted with frequent UTIs, has urostomy. Review of Systems:  10 systems reviewed and negative except as noted in HPI. Assessment & Plan:      Active Problems:    SBO (small bowel obstruction) (H[facility])  Plan: NPO  NGT  ED spoke with surgery , consult placed  IVF      Mild episode of recurrent major depressive disorder ([facility] [address]  Plan: holding oral agents for now      Colostomy complication, unspecified ([address]  Plan: noted  As listed in #1      Rectal cancer ([address]  Plan: #1      Hypothyroidism  Plan: holding oral agents      Urinary tract infection associated with indwelling urethral catheter ([address]  Plan: noted with frequent [address]  Last urine culture 10/21 skin hay, July Pseudomonas  Will consult ID for questionable need for ABT but continue Rocephin for now  Trend urine culture        Anticipated discharge needs:     Pending clinical course, PT/OT ordered    Diet: Diet NPO  VTE ppx: SCDs  Code status: Full Code    Hospital Problems:  Active Problems:    Mild episode of recurrent major depressive disorder ([address]    Colostomy complication, unspecified ([facility] [address]    SBO (small bowel obstruction) ([facility] [address]    Rectal cancer ([address]    Hypothyroidism    Urinary tract infection associated with indwelling urethral catheter ([address]  Resolved Problems:    * No resolved hospital problems.  *       Past History:     Past Medical History:   Diagnosis Date    Acute deep vein thrombosis (DVT) of non-extremity vein 5/20/2019    Family history of malignant neoplasm of gastrointestinal tract     mother colon/ovarian cancer 67    Fecal incontinence     LAR syndrome    John catheter in place 2022    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    surgery and radiation    Psychiatric disorder     anxiety       Past Surgical History:   Procedure Laterality Date    COLONOSCOPY  last 2/3/15    Roberto--no polyps--3 year recall    COLONOSCOPY N/A 3/5/2021    COLONOSCOPY/BMI 19 performed by Jose Phillip MD at [facility] ENDOSCOPY    COLONOSCOPY N/A 2/12/2018    COLONOSCOPY / BMI=21 performed by Charisse Carrel, MD at [place] [address]  2/12/2018         COLONOSCOPY THRU STOMA,JEFF RMVKEVON W/SNARE  3/5/2021         COLOSTOMY  5/11/16    CT RETROPERITONEAL PERC DRAIN  5/13/2021    CT RETROPERITONEAL PERC DRAIN 5/13/2021 [facility] CT SCAN    CT RETROPERITONEAL PERC DRAIN  5/20/2021    CT RETROPERITONEAL PERC DRAIN 5/20/2021 [facility] CT SCAN    GI  4/2016    Sacral nerve stimlator lead trial (unsucessful) and removal    HERNIA REPAIR      and Colostomy in May    [address] 3/2015, 2016    IR ABSCESS EVAL THRU EXISTING CATH  2021    IR ABSCESS EVAL THRU EXISTING CATH  2021    IR ABSCESS EVAL THRU EXISTING CATH  2021    IR NEPHROSTOGRAM EXISTING ACCESS  12/10/2021    IR NEPHROSTOGRAM EXISTING ACCESS  10/4/2021    IR NEPHROSTOMY EXCHANGE CATHETER  2021    IR NEPHROSTOMY EXCHANGE CATHETER  11/3/2021    IR NEPHROSTOMY EXCHANGE CATHETER  10/28/2021    IR NEPHROSTOMY PERCUTANEOUS LEFT  2021    IR NEPHROSTOMY PERCUTANEOUS LEFT  2021    IR NEPHROSTOMY PERCUTANEOUS LEFT 2021 [facility] SPECI[facility]    IR REPLCE T CVC WO PORT SAME ACC  2019    IR TUBE CHANGE  2021    IR TUBE CHANGE  2021    IR TUBE CHANGE  2021    IR TUBE CHANGE  2021    IR TUNNELED CATHETER PLACEMENT GREATER THAN 5 YEARS  2019    IR TUNNELED CATHETER PLACEMENT GREATER THAN 5 YEARS  2019    IR TUNNELED CATHETER PLACEMENT GREATER THAN 5 YEARS 2019 [facility] SPECIALS    OTHER SURGICAL HISTORY Bilateral     cataracts  2017    OTHER SURGICAL HISTORY  10/7/2013    Roberto---endorectal us    POLYPECTOMY      TOTAL COLECTOMY  2013    Roberto--lap LAR with coloproctostomy, mobilization splenic flexure, diverting loop ileostomy    TOTAL COLECTOMY Right 2014    for leak    VASCULAR SURGERY Left     single lumen port/subsequently removed        Social History     Tobacco Use    Smoking status: Former     Packs/day: 1.00     Types: Cigarettes     Quit date: 1963     Years since quittin.0    Smokeless tobacco: Never   Substance Use Topics    Alcohol use: Not Currently     Alcohol/week: 11.7 standard drinks      Social History     Substance and Sexual Activity   Drug Use No     Family History   Problem Relation Age of Onset    Cancer Mother         colon and ovarian    Cancer Brother         prostate    Alcohol Abuse Brother     Cancer Maternal Grandmother         bone    Alcohol Abuse Father     Stroke Paternal Grandfather     Alcohol Abuse Sister         Immunization History   Administered Date(s) Administered    COVID-19, PFIZER PURPLE top, DILUTE for use, (age 15 y+), 30mcg/0.3mL 02/15/2021, 03/08/2021, 10/25/2021    PPD Test 01/28/2014, 08/24/2014, 09/09/2014, 11/19/2015, 05/23/2016, 03/25/2019, 04/23/2019, 09/24/2021, 09/27/2021, 02/24/2022, 06/27/2022, 10/17/2022     No Known Allergies  Prior to Admit Medications:  Current Outpatient Medications   Medication Instructions    acetaminophen (TYLENOL) 500 mg, Oral, EVERY 6 HOURS PRN    amitriptyline (ELAVIL) 50 mg, Oral, NIGHTLY    carvedilol (COREG) 6.25 mg, Oral, 2 TIMES DAILY WITH MEALS    ciprofloxacin (CIPRO) 500 mg, Oral, 2 TIMES DAILY    clonazePAM (KLONOPIN) 4 mg, Oral, NIGHTLY PRN    ibuprofen (ADVIL;MOTRIN) 600 mg, Oral, EVERY 6 HOURS PRN    levothyroxine (SYNTHROID) 125 MCG tablet TAKE ONE TABLET BY MOUTH ONE TIME DAILY BEFORE BREAKFAST    OLANZapine (ZYPREXA) 7.5 mg, Oral, NIGHTLY         Objective:   Patient Vitals for the past 24 hrs:   Temp Pulse Resp BP SpO2   11/14/22 0800 -- -- -- 134/77 94 %   11/14/22 0603 -- -- -- (!) 164/87 96 %   11/14/22 0548 -- -- -- (!) 156/88 98 %   11/14/22 0423 -- -- -- -- 99 %   11/14/22 0420 97.9 °F (36.6 °C) 91 18 (!) 163/84 --       Oxygen Therapy  SpO2: 94 %  Pulse Oximeter Device Mode: Intermittent  O2 Device: None (Room air)    Estimated body mass index is 18.73 kg/m² as calculated from the following:    Height as of this encounter: 5' 8.5\" (1.74 m). Weight as of this encounter: 125 lb (56.7 kg). No intake or output data in the 24 hours ending 11/14/22 1022      Physical Exam:  Blood pressure 134/77, pulse 91, temperature 97.9 °F (36.6 °C), temperature source Oral, resp. rate 18, height 5' 8.5\" (1.74 m), weight 125 lb (56.7 kg), SpO2 94 %. General:    Well nourished. Head:  Normocephalic, atraumatic  Eyes:  Sclerae appear normal.  Pupils equally round. ENT:  Nares appear normal, no drainage. Moist oral mucosa  Neck:  No restricted ROM. Trachea midline   CV:   RRR. No m/r/g. No jugular venous distension. Lungs:   CTAB. No wheezing, rhonchi, or rales. Symmetric expansion. Abdomen: The abdomen is somewhat firm to palpation with distention noted. No significant rebound or guarding exam.  Bowel sounds are diminished throughout. Minimal stool in patient's colostomy bag. :                  urostomy intact, clear, yellow urine noted. Extremities: No cyanosis or clubbing. No edema  Skin:     No rashes and normal coloration. Warm and dry. Neuro:  CN II-XII grossly intact. Sensation intact. A&Ox3  Psych:  Normal mood and affect.       I have personally reviewed labs and tests showing:  Recent Labs:  Recent Results (from the past 24 hour(s))   CBC with Diff    Collection Time: 11/14/22  4:28 AM   Result Value Ref Range    WBC 7.5 4.3 - 11.1 K/uL    RBC 4.01 (L) 4.23 - 5.6 M/uL    Hemoglobin 11.7 (L) 13.6 - 17.2 g/dL    Hematocrit 37.3 (L) 41.1 - 50.3 %    MCV 93.0 82 - 102 FL    MCH 29.2 26.1 - 32.9 PG    MCHC 31.4 31.4 - 35.0 g/dL    RDW 15.8 (H) 11.9 - 14.6 %    Platelets 192 708 - 779 K/uL    MPV 9.4 9.4 - 12.3 FL    nRBC 0.00 0.0 - 0.2 K/uL    Differential Type AUTOMATED      Seg Neutrophils 84 (H) 43 - 78 %    Lymphocytes 7 (L) 13 - 44 %    Monocytes 7 4.0 - 12.0 %    Eosinophils % 1 0.5 - 7.8 %    Basophils 1 0.0 - 2.0 %    Immature Granulocytes 0 0.0 - 5.0 %    Segs Absolute 6.4 1.7 - 8.2 K/UL    Absolute Lymph # 0.5 0.5 - 4.6 K/UL    Absolute Mono # 0.5 0.1 - 1.3 K/UL    Absolute Eos # 0.1 0.0 - 0.8 K/UL    Basophils Absolute 0.0 0.0 - 0.2 K/UL    Absolute Immature Granulocyte 0.0 0.0 - 0.5 K/UL   CMP    Collection Time: 11/14/22  4:28 AM   Result Value Ref Range    Sodium 136 133 - 143 mmol/L    Potassium 4.2 3.5 - 5.1 mmol/L    Chloride 104 101 - 110 mmol/L    CO2 26 21 - 32 mmol/L    Anion Gap 6 2 - 11 mmol/L    Glucose 121 (H) 65 - 100 mg/dL    BUN 27 (H) 8 - 23 MG/DL    Creatinine 1.50 0.8 - 1.5 MG/DL    Est, Glom Filt Rate 47 (L) >60 ml/min/1.73m2    Calcium 9.1 8.3 - 10.4 MG/DL    Total Bilirubin 0.4 0.2 - 1.1 MG/DL    ALT 33 12 - 65 U/L    AST 28 15 - 37 U/L    Alk Phosphatase 67 50 - 136 U/L    Total Protein 7.5 6.3 - 8.2 g/dL    Albumin 3.3 3.2 - 4.6 g/dL    Globulin 4.2 2.8 - 4.5 g/dL    Albumin/Globulin Ratio 0.8 0.4 - 1.6     Lipase    Collection Time: 11/14/22  4:28 AM   Result Value Ref Range    Lipase 164 73 - 393 U/L   POCT Urinalysis no Micro    Collection Time: 11/14/22  6:09 AM   Result Value Ref Range    Specific Gravity, Urine, POC 1.025 (H) 1.001 - 1.023      pH, Urine, POC 5.5 5.0 - 9.0      Protein, Urine,  (A) NEG mg/dL    Glucose, UA POC Negative NEG mg/dL    Ketones, Urine, POC Negative NEG mg/dL    Bilirubin, Urine, POC Negative NEG      Blood, UA POC LARGE (A) NEG      URINE UROBILINOGEN POC 0.2 0.2 - 1.0 EU/dL    Nitrate, Urine, POC Negative NEG      Leukocyte Est, UA POC LARGE (A) NEG     Urinalysis w rflx microscopic    Collection Time: 11/14/22  6:15 AM   Result Value Ref Range    Color, UA YELLOW/STRAW      Appearance TURBID      Specific Gravity, UA 1.012 1.001 - 1.023      pH, Urine 5.0 5.0 - 9.0      Protein,  (A) NEG mg/dL    Glucose, UA Negative mg/dL    Ketones, Urine Negative NEG mg/dL    Bilirubin Urine Negative NEG      Blood, Urine LARGE (A) NEG      Urobilinogen, Urine 0.2 0.2 - 1.0 EU/dL    Nitrite, Urine Negative NEG      Leukocyte Esterase, Urine LARGE (A) NEG      WBC, UA >100 0 /hpf    RBC, UA  0 /hpf    Epithelial Cells UA 0-3 0 /hpf    BACTERIA, URINE 3+ (H) 0 /hpf    Yeast, UA MODERATE      Other observations RESULTS VERIFIED MANUALLY         I have personally reviewed imaging studies showing:  XR ABDOMEN (KUB) (SINGLE AP VIEW)    Result Date: 11/14/2022  EXAMINATION: Abdomen. HISTORY: 7/8/2022 TECHNIQUE: Single frontal view of the abdomen. COMPARISON: None available. FINDINGS: No significant change in the appearance of the right ureteral stent.  There appears to be a bladder catheter although it is different from the previous catheter. There are dilated loops of bowel noted in the right abdomen. It is difficult to differentiate between large and small bowel. Stool is seen in the left upper quadrant. An ostomy is seen over the left lower quadrant. No definitive evidence of free air. Osseous structures and soft tissues appear within normal limits. There are dilated loops of bowel noted in the right abdomen. It is difficult to differentiate between large and small bowel. Bowel obstruction is not excluded. Stool is seen in the left upper quadrant. CT ABDOMEN PELVIS W IV CONTRAST Additional Contrast? None    Result Date: 11/14/2022  HISTORY:  concern for sbo, right lower quadrant abdominal pain. History of bowel obstruction. COMPARISON: 7/7/2022 EXAM: CT abdomen and pelvis with iv contrast TECHNIQUE: Thin section axial CT was performed from the lung bases through the symphysis pubis during uneventful rapid bolus intravenous administration of 125 mL of Isovue 370. Oral contrast was also administered. Radiation dose reduction techniques were used for this study. Our CT scanners use one or all of the following: Automated exposure control, adjustment of the mA and/or kV according to patient size, use of iterative reconstruction. FINDINGS: There is bibasilar scarring present. CT abdomen: The liver and spleen enhances homogeneously without discrete lesions. There is no biliary ductal dilatation. The gallbladder, pancreas and adrenal glands are normal. There is a right-sided ureteral stent present with persistent moderate right-sided hydronephrosis. . Multiple dilated loops of small bowel are present with \"small bowel feces sign\" within the central abdomen. There are decompressed loops of small bowel within the left midabdomen. No definite upper abdominal lymphadenopathy seen. CT pelvis: There is a John catheter in the bladder. The rectum is normal. No pelvic adenopathy seen. chloride 0.9 % 50 mL IVPB mini-bag     Order Specific Question:   Antimicrobial Indications     Answer:   Urinary Tract Infection     Order Specific Question:   UTI duration of therapy     Answer:   5 days         Signed:  GERMAN Martinez CNP

## 2022-11-15 ENCOUNTER — APPOINTMENT (OUTPATIENT)
Dept: PHYSICAL THERAPY | Age: 77
End: 2022-11-15
Payer: MEDICARE

## 2022-11-15 ENCOUNTER — APPOINTMENT (OUTPATIENT)
Dept: GENERAL RADIOLOGY | Age: 77
DRG: 388 | End: 2022-11-15
Payer: MEDICARE

## 2022-11-15 PROBLEM — R55 SYNCOPE: Status: ACTIVE | Noted: 2022-11-15

## 2022-11-15 LAB
ANION GAP SERPL CALC-SCNC: 5 MMOL/L (ref 2–11)
BASOPHILS # BLD: 0 K/UL (ref 0–0.2)
BASOPHILS NFR BLD: 1 % (ref 0–2)
BUN SERPL-MCNC: 18 MG/DL (ref 8–23)
CALCIUM SERPL-MCNC: 9.1 MG/DL (ref 8.3–10.4)
CHLORIDE SERPL-SCNC: 102 MMOL/L (ref 101–110)
CO2 SERPL-SCNC: 29 MMOL/L (ref 21–32)
CREAT SERPL-MCNC: 1.3 MG/DL (ref 0.8–1.5)
DIFFERENTIAL METHOD BLD: ABNORMAL
EKG ATRIAL RATE: 90 BPM
EKG DIAGNOSIS: NORMAL
EKG P AXIS: 50 DEGREES
EKG P-R INTERVAL: 180 MS
EKG Q-T INTERVAL: 346 MS
EKG QRS DURATION: 114 MS
EKG QTC CALCULATION (BAZETT): 423 MS
EKG R AXIS: 62 DEGREES
EKG T AXIS: 34 DEGREES
EKG VENTRICULAR RATE: 90 BPM
EOSINOPHIL # BLD: 0.1 K/UL (ref 0–0.8)
EOSINOPHIL NFR BLD: 1 % (ref 0.5–7.8)
ERYTHROCYTE [DISTWIDTH] IN BLOOD BY AUTOMATED COUNT: 15.8 % (ref 11.9–14.6)
GLUCOSE SERPL-MCNC: 99 MG/DL (ref 65–100)
HCT VFR BLD AUTO: 34.2 % (ref 41.1–50.3)
HGB BLD-MCNC: 10.6 G/DL (ref 13.6–17.2)
IMM GRANULOCYTES # BLD AUTO: 0 K/UL (ref 0–0.5)
IMM GRANULOCYTES NFR BLD AUTO: 0 % (ref 0–5)
LYMPHOCYTES # BLD: 0.5 K/UL (ref 0.5–4.6)
LYMPHOCYTES NFR BLD: 7 % (ref 13–44)
MCH RBC QN AUTO: 28.9 PG (ref 26.1–32.9)
MCHC RBC AUTO-ENTMCNC: 31 G/DL (ref 31.4–35)
MCV RBC AUTO: 93.2 FL (ref 82–102)
MONOCYTES # BLD: 0.6 K/UL (ref 0.1–1.3)
MONOCYTES NFR BLD: 8 % (ref 4–12)
NEUTS SEG # BLD: 5.7 K/UL (ref 1.7–8.2)
NEUTS SEG NFR BLD: 83 % (ref 43–78)
NRBC # BLD: 0 K/UL (ref 0–0.2)
PHOSPHATE SERPL-MCNC: 3.2 MG/DL (ref 2.3–3.7)
PLATELET # BLD AUTO: 192 K/UL (ref 150–450)
PMV BLD AUTO: 8.9 FL (ref 9.4–12.3)
POTASSIUM SERPL-SCNC: 4 MMOL/L (ref 3.5–5.1)
RBC # BLD AUTO: 3.67 M/UL (ref 4.23–5.6)
SODIUM SERPL-SCNC: 136 MMOL/L (ref 133–143)
TROPONIN I SERPL HS-MCNC: 259.8 PG/ML (ref 0–14)
TROPONIN I SERPL HS-MCNC: 26.7 PG/ML (ref 0–14)
TROPONIN I SERPL HS-MCNC: 394.5 PG/ML (ref 0–14)
TROPONIN I SERPL HS-MCNC: 446 PG/ML (ref 0–14)
WBC # BLD AUTO: 6.9 K/UL (ref 4.3–11.1)

## 2022-11-15 PROCEDURE — 6370000000 HC RX 637 (ALT 250 FOR IP)

## 2022-11-15 PROCEDURE — 1100000003 HC PRIVATE W/ TELEMETRY

## 2022-11-15 PROCEDURE — C9113 INJ PANTOPRAZOLE SODIUM, VIA: HCPCS | Performed by: NURSE PRACTITIONER

## 2022-11-15 PROCEDURE — 2580000003 HC RX 258: Performed by: INTERNAL MEDICINE

## 2022-11-15 PROCEDURE — 6370000000 HC RX 637 (ALT 250 FOR IP): Performed by: FAMILY MEDICINE

## 2022-11-15 PROCEDURE — 2580000003 HC RX 258: Performed by: NURSE PRACTITIONER

## 2022-11-15 PROCEDURE — 84100 ASSAY OF PHOSPHORUS: CPT

## 2022-11-15 PROCEDURE — 85025 COMPLETE CBC W/AUTO DIFF WBC: CPT

## 2022-11-15 PROCEDURE — A4216 STERILE WATER/SALINE, 10 ML: HCPCS | Performed by: NURSE PRACTITIONER

## 2022-11-15 PROCEDURE — 6360000002 HC RX W HCPCS: Performed by: FAMILY MEDICINE

## 2022-11-15 PROCEDURE — 36415 COLL VENOUS BLD VENIPUNCTURE: CPT

## 2022-11-15 PROCEDURE — 6360000002 HC RX W HCPCS: Performed by: NURSE PRACTITIONER

## 2022-11-15 PROCEDURE — 71045 X-RAY EXAM CHEST 1 VIEW: CPT

## 2022-11-15 PROCEDURE — 6360000002 HC RX W HCPCS: Performed by: INTERNAL MEDICINE

## 2022-11-15 PROCEDURE — 80048 BASIC METABOLIC PNL TOTAL CA: CPT

## 2022-11-15 PROCEDURE — 93005 ELECTROCARDIOGRAM TRACING: CPT | Performed by: FAMILY MEDICINE

## 2022-11-15 PROCEDURE — 84484 ASSAY OF TROPONIN QUANT: CPT

## 2022-11-15 PROCEDURE — 6370000000 HC RX 637 (ALT 250 FOR IP): Performed by: STUDENT IN AN ORGANIZED HEALTH CARE EDUCATION/TRAINING PROGRAM

## 2022-11-15 PROCEDURE — 74018 RADEX ABDOMEN 1 VIEW: CPT

## 2022-11-15 PROCEDURE — 51702 INSERT TEMP BLADDER CATH: CPT

## 2022-11-15 RX ORDER — HEPARIN SODIUM 5000 [USP'U]/ML
5000 INJECTION, SOLUTION INTRAVENOUS; SUBCUTANEOUS EVERY 8 HOURS SCHEDULED
Status: DISCONTINUED | OUTPATIENT
Start: 2022-11-15 | End: 2022-11-15

## 2022-11-15 RX ORDER — HEPARIN SODIUM 5000 [USP'U]/ML
5000 INJECTION, SOLUTION INTRAVENOUS; SUBCUTANEOUS EVERY 8 HOURS SCHEDULED
Status: DISCONTINUED | OUTPATIENT
Start: 2022-11-15 | End: 2022-11-18 | Stop reason: HOSPADM

## 2022-11-15 RX ORDER — METOPROLOL TARTRATE 5 MG/5ML
2.5 INJECTION INTRAVENOUS EVERY 6 HOURS PRN
Status: DISCONTINUED | OUTPATIENT
Start: 2022-11-15 | End: 2022-11-18 | Stop reason: HOSPADM

## 2022-11-15 RX ORDER — ASPIRIN 81 MG/1
81 TABLET, CHEWABLE ORAL ONCE
Status: COMPLETED | OUTPATIENT
Start: 2022-11-15 | End: 2022-11-15

## 2022-11-15 RX ADMIN — POLYETHYLENE GLYCOL 3350 17 G: 17 POWDER, FOR SOLUTION ORAL at 09:38

## 2022-11-15 RX ADMIN — OLANZAPINE 7.5 MG: 2.5 TABLET, FILM COATED ORAL at 21:00

## 2022-11-15 RX ADMIN — SODIUM CHLORIDE, PRESERVATIVE FREE 10 ML: 5 INJECTION INTRAVENOUS at 09:38

## 2022-11-15 RX ADMIN — POLYETHYLENE GLYCOL 3350 17 G: 17 POWDER, FOR SOLUTION ORAL at 21:00

## 2022-11-15 RX ADMIN — HEPARIN SODIUM 5000 UNITS: 5000 INJECTION INTRAVENOUS; SUBCUTANEOUS at 19:12

## 2022-11-15 RX ADMIN — CEFTRIAXONE 1000 MG: 1 INJECTION, POWDER, FOR SOLUTION INTRAMUSCULAR; INTRAVENOUS at 09:36

## 2022-11-15 RX ADMIN — ASPIRIN 81 MG: 81 TABLET, CHEWABLE ORAL at 19:13

## 2022-11-15 RX ADMIN — CLONAZEPAM 4 MG: 1 TABLET ORAL at 21:00

## 2022-11-15 RX ADMIN — AMITRIPTYLINE HYDROCHLORIDE 100 MG: 50 TABLET, FILM COATED ORAL at 21:00

## 2022-11-15 RX ADMIN — SODIUM CHLORIDE 40 MG: 9 INJECTION, SOLUTION INTRAMUSCULAR; INTRAVENOUS; SUBCUTANEOUS at 09:37

## 2022-11-15 RX ADMIN — SODIUM CHLORIDE 40 MG: 9 INJECTION, SOLUTION INTRAMUSCULAR; INTRAVENOUS; SUBCUTANEOUS at 21:00

## 2022-11-15 RX ADMIN — SODIUM CHLORIDE, POTASSIUM CHLORIDE, SODIUM LACTATE AND CALCIUM CHLORIDE: 600; 310; 30; 20 INJECTION, SOLUTION INTRAVENOUS at 17:28

## 2022-11-15 RX ADMIN — SODIUM CHLORIDE, PRESERVATIVE FREE 10 ML: 5 INJECTION INTRAVENOUS at 22:43

## 2022-11-15 ASSESSMENT — PAIN SCALES - GENERAL: PAINLEVEL_OUTOF10: 0

## 2022-11-15 NOTE — PROGRESS NOTES
Comprehensive Nutrition Assessment    Type and Reason for Visit: Initial, Positive Nutrition Screen  Malnutrition Screening Tool: Malnutrition Screen  Have you recently lost weight without trying?: 2 to 13 pounds (1 point)  Have you been eating poorly because of a decreased appetite?: Yes (1 point)  Malnutrition Screening Tool Score: 2    Nutrition Recommendations/Plan:   Continue NPO. Advancement per MD.     Malnutrition Assessment:  Malnutrition Status: Severe malnutrition  Context: Chronic Illness  Findings of clinical characteristics of malnutrition:   Energy Intake:  Mild decrease in energy intake (Comment) (variable intake/appetite PTA)  Weight Loss:  Greater than 10% over 6 months     Body Fat Loss:  Severe body fat loss Orbital, Triceps   Muscle Mass Loss:  Severe muscle mass loss Temples (temporalis), Clavicles (pectoralis & deltoids), Scapula (trapezius), Calf (gastrocnemius)  Fluid Accumulation:  Unable to assess     Strength:  Not Performed     Nutrition Assessment:  Nutrition History: Pt reports variable intake/appetite PTA- typically consumes 2 meals/day + snacks. Pt reports lowest weight of ~115lb prior to recent STR admission however states he gained 8-10lb during STR and now weighs ~123-125lb. Pt states prior to rectal cancer dx and colostomy in 2013 he typically weighed 155lb, reports prior active lifestyle. Pt reports prior intake of ONS in STR but none at home. Of note, pt with prior hx of requiring TPN in 2019 following additional bowel surgeries. Do You Have Any Cultural, Faith, or Ethnic Food Preferences?: No   Nutrition Background:    Pt with PMH significant for H/O rectal CA with colostomy, urostomy related to radiation, hypothyroidism who presented with decreased output to colostomy past 24 hours. He was recently hospitalized 10/16-10/27 for LE weakness, received IVIG and followed by neuro, he was dc'd to [facility] and states dc from there 11/10 in good health.  CT A/P consistent with SBO. Nutrition Interval:  Pt seen at bedside, no visitors present. NGT to LIS, ~100ml green SHOEMAKER in [address]. Pt provided nutrition hx as above. Discussed pt with RN. Per Surgery, Pt probably does have a chronic SBO. This situation is not surgically approachable- he has a hostile abdomen and any surgical intervention would carry an unacceptably high risk of complications such as wound failure and fistula formation (again). Recommend gentle but persistent attempts opening small bowel with laxatives. Current Nutrition Therapies:  Diet NPO    Current Intake:   Average Meal Intake: NPO Average Supplements Intake: NPO      Anthropometric Measures:  Height: 5' 8.5\" (174 cm)  Current Body Wt: 123 lb 7.3 oz (56 kg) (11/14), Weight source: Bed Scale  BMI: 18.5, Underweight (BMI less than 22) age over 72     Ideal Body Weight (Kg) (Calculated): 71 kg (157 lbs), 78.6 %  Usual Body Wt: 145 lb (65.8 kg) (MD office appt 5/2022), Percent weight change: -14.9       Edema:Peripheral Vascular  Peripheral Vascular (WDL): Within Defined Limits  Edema: None   BMI Category Underweight (BMI less than 22) age over 72    Estimated Daily Nutrient Needs:  Energy (kcal/day): 2537-6120 (25-30kcal/kg) (Kcal/kg Weight used: 56 kg Current  Protein (g/day): 67-84 (1.2-1.5g/kg) Weight Used: (Current) 56 kg  Fluid (ml/day):   (1 ml/kcal)    Nutrition Diagnosis:   Inadequate oral intake related to altered GI function, altered GI structure as evidenced by NPO or clear liquid status due to medical condition    Severe malnutrition, In context of chronic illness related to inadequate protein-energy intake as evidenced by Criteria as identified in malnutrition assessment    Nutrition Interventions:   Food and/or Nutrient Delivery: Continue NPO     Coordination of Nutrition Care: Continue to monitor while inpatient  Plan of Care discussed with: Katherine Casas RN    Goals:       Active Goal:  (advance and tolerate diet > CLQ by next RD assessment)       Nutrition Monitoring and Evaluation:      Food/Nutrient Intake Outcomes: Diet Advancement/Tolerance  Physical Signs/Symptoms Outcomes: Weight, Biochemical Data    Discharge Planning:     Too soon to determine    Freedom Michel) Juan David Gastelum, [address] [place] [address],   Contact: [phone]

## 2022-11-15 NOTE — PROGRESS NOTES
TRANSFER - IN REPORT:    Verbal report received from Lakeshia To [facility] on [address]  being received from ED for routine progression of patient care      Report consisted of patient's Situation, Background, Assessment and   Recommendations(SBAR). Information from the following report(s) Nurse Handoff Report, Index, ED SBAR, Adult Overview, MAR, and Recent Results was reviewed with the receiving nurse. Opportunity for questions and clarification was provided.

## 2022-11-15 NOTE — PROGRESS NOTES
Occupational Therapy Note:    OT consult received and chart reviewed--attempted to see pt for initial assessment. Pt with rapid response called this AM due to syncopal episode. RN reported pt is now on bedrest until further workup is performed. Will continue to follow and attempt to see as schedule allows/medically appropriate.     Thank you,  Victor Hugo Burns, OTR/L

## 2022-11-15 NOTE — PROGRESS NOTES
RRT Clinical Rounding Nurse Update    Vitals:    11/15/22 1134 11/15/22 1240 11/15/22 1338 11/15/22 1540   BP: (!) 140/84  (!) 145/85 (!) 143/95   Pulse: 92  88 92   Resp: 16  16 16   Temp: 97.4 °F (36.3 °C)  98.7 °F (37.1 °C) 97.2 °F (36.2 °C)   TempSrc: Oral  Axillary Oral   SpO2: 97%  92% 96%   Weight:       Height:  5' 8.5\" (1.74 m)          DETERIORATION INDEX SCORE: 21    ASSESSMENT:  Previous outreach assessment was reviewed. There have been no significant changes since previous assessment. PLAN:  Will follow per RRT Clinical Rounding Program protocol.  Patient concerned about his home blood pressure medications, primary RN notified and is contacting MD.    Romana Boyd, [address]: [facility]: [phone]

## 2022-11-15 NOTE — ED NOTES
Report given to [facility].       Kelly Burns, RN  11/1945

## 2022-11-15 NOTE — PROGRESS NOTES
TRANSFER - OUT REPORT:    Verbal report given to Shnaon Bowie RN on [address]  being transferred to 6th floor for routine progression of patient care       Report consisted of patient's Situation, Background, Assessment and   Recommendations(SBAR). Information from the following report(s) Nurse Handoff Report, Intake/Output, and Recent Results was reviewed with the receiving nurse. Soldier Assessment: No data recorded  Lines:   Peripheral IV 11/14/22 Left Wrist (Active)   Site Assessment Clean, dry & intact 11/15/22 1130   Line Status Infusing 11/15/22 1130   Line Care Ports disinfected 11/15/22 1130   Phlebitis Assessment No symptoms 11/15/22 1130   Infiltration Assessment 0 11/15/22 1130   Alcohol Cap Used Yes 11/15/22 1130   Dressing Status Clean, dry & intact 11/15/22 1130   Dressing Type Transparent 11/15/22 1130       Peripheral IV 11/14/22 Right; Anterior Forearm (Active)   Site Assessment Clean, dry & intact 11/15/22 1130   Line Status Capped;Flushed 11/15/22 1130   Line Care Ports disinfected 11/15/22 1130   Phlebitis Assessment No symptoms 11/15/22 1130   Infiltration Assessment 0 11/15/22 1130   Alcohol Cap Used Yes 11/15/22 1130   Dressing Status Clean, dry & intact 11/15/22 1130   Dressing Type Transparent 11/15/22 1130        Opportunity for questions and clarification was provided.       Patient transported with:  O2 @ 2lpm

## 2022-11-15 NOTE — CONSULTS
Infectious Disease Consult    Today's Date: 11/15/2022   Admit Date: 11/14/2022    Impression:   Small bowel obstruction   No plans for surgery, NG to LIS  Rectal cancer s/p sigmoidectomy with colostomy 2013  Chronic indwelling carr d/t urinary retention  Hx of UTI, urine cx with pseudomonas in July 2022  Right ureteral stent, plans for stent replacement in December 2022     Plan:   Blood cx 11/14, NGTD - follow culture results   Urine cx with Enterococcus, susceptibility to follow  Stop ceftriaxone, patient does not have urinary symptoms, afebrile and WBC WNL. Do not recommend any antibiotics at this time. Anti-infectives:   Ceftriaxone 11/14 - 11/15    Subjective:   Date of Consultation:  November 15, 2022  Referring Physician: Hospitalist     Patient is a 76 y.o. male with PMH of rectal cancer s/p sigmoidectomy with colostomy, sepsis secondary to polymicrobial bacteremia in setting of CAUTI, hypothyroidism, anxiety/depression. Previous abdominal surgeries: total colectomy with ileostomy creation by Dr. Efraín Perez 2013, hernia repairs x 2 (2015, 2016)    He was recently hospitalized 10/17-10/27 for LE weakness, received IVIG and followed by neuro. He was also treated for UTI - his chronic carr was changed and he received cefepime while inpatient and transitioned to ANNI PEREIRA at [facility] for 5 more days. He was discharged to [facility]. He presents to the ED 11/14/2022 c/o no output from ostomy since 11/13 morning and generalized RLQ abdominal pain. Ct abd/pelvis consistent with small bowel obstruction. UA done showing large leukocytes, started on Rocephin and urine cx ordered. Blood cx drawn. 11/15: Afebrile. Patient is resting in bed. He states he is having some RLQ abdominal pain but the cramping he was having has resolved. He has a chronic indwelling carr, last exchanged approximately 3 weeks ago. He states his ostomy is still not putting much out. He denies N/V.      Patient Active Problem List Diagnosis    Fecal incontinence    Dilated intrahepatic bile duct    SBO (small bowel obstruction) (HCC)    Hypoxemia    Dehydration    Weight loss    Rectal cancer (HCC)    Bladder injury with open wound into cavity    Hypercalcemia of malignancy    Severe sepsis with acute organ dysfunction (HCC)    Attention to ileostomy (HCC)    Acute blood loss anemia    Anemia    Abscess    Hypernatremia    Decreased activities of daily living (ADL)    DVT (deep vein thrombosis) in pregnancy    Enterocutaneous fistula    Hypothyroidism    Small bowel obstruction (HCC)    Ileus (HCC)    Abdominal wall abscess    Pleural effusion    H/O urinary retention    Severe protein-calorie malnutrition (HCC)    Elevated LFTs    Urinary retention    Hydronephrosis of right kidney    C. difficile colitis    Anxiety    Pancreatic duct dilated    Hypomagnesemia    Rectal fistula    Hematuria    Major depression    Colostomy care ([address]    Chronic indwelling John catheter    Acute deep vein thrombosis (DVT) of non-extremity vein    Hypokalemia    Postoperative ileus (HCC)    Bowel incontinence    Constipation    Urinary tract infection associated with indwelling urethral catheter ([address]    Acute kidney injury superimposed on CKD ([address]    Pelvic abscess in male [facility])    Infected prosthetic mesh of abdominal wall (HCC)    Fever    Acute UTI (urinary tract infection)    Pseudopolyposis of colon without complication, unspecified part of colon ([address]    Sepsis with acute organ dysfunction (HCC)    Mild episode of recurrent major depressive disorder (HCC)    Gram-negative bacteremia    Bacteremia due to Gram-positive bacteria    Acute urinary retention    Leg weakness, bilateral    Colostomy complication, unspecified (HCC)     Past Medical History:   Diagnosis Date    Acute deep vein thrombosis (DVT) of non-extremity vein 5/20/2019    Family history of malignant neoplasm of gastrointestinal tract     mother colon/ovarian cancer 67    Fecal incontinence     LAR syndrome    John catheter in place     patient stated- \"catheter due to them nicking his bladder and it won't heal\"    Former cigarette smoker     History of rectal cancer     Hypothyroid     stable w/med    Infection and inflammatory reaction due to other internal prosthetic devices, implants and grafts, subsequent encounter     abd wound/mesh colostomy    Insomnia     takes meds    Major depression     Osteoarthritis, hand     Personal history of colonic polyps 9/2013    x 1    Personal history of malignant neoplasm of rectum, rectosigmoid junction, and anus 2013    surgery and radiation    Psychiatric disorder     anxiety      Family History   Problem Relation Age of Onset    Cancer Mother         colon and ovarian    Cancer Brother         prostate    Alcohol Abuse Brother     Cancer Maternal Grandmother         bone    Alcohol Abuse Father     Stroke Paternal Grandfather     Alcohol Abuse Sister       Social History     Tobacco Use    Smoking status: Former     Packs/day: 1.00     Types: Cigarettes     Quit date: 1963     Years since quittin.0    Smokeless tobacco: Never   Substance Use Topics    Alcohol use: Not Currently     Alcohol/week: 11.7 standard drinks     Past Surgical History:   Procedure Laterality Date    COLONOSCOPY  last 2/3/15    Roberto--no polyps--3 year recall    COLONOSCOPY N/A 3/5/2021    COLONOSCOPY/BMI 19 performed by Tangela Medina MD at [facility] ENDOSCOPY    COLONOSCOPY N/A 2018    COLONOSCOPY / BMI=21 performed by Bibi Sawyer MD at [place] [id number]  2018         COLONOSCOPY THRU STOMA,LESN RMVL W/SNARE  3/5/2021         COLOSTOMY  16    CT RETROPERITONEAL PERC DRAIN  2021    CT RETROPERITONEAL PERC DRAIN 2021 [facility] CT SCAN    CT RETROPERITONEAL PERC DRAIN  2021    CT RETROPERITONEAL PERC DRAIN 2021 [facility] CT SCAN    GI  2016    Sacral nerve stimlator lead trial (unsucessful) and removal    HERNIA REPAIR      and Colostomy in May    HERNIA REPAIR  3/2015, 5/2016    IR ABSCESS EVAL THRU EXISTING CATH  11/24/2021    IR ABSCESS EVAL THRU EXISTING CATH  6/4/2021    IR ABSCESS EVAL THRU EXISTING CATH  5/20/2021    IR NEPHROSTOGRAM EXISTING ACCESS  12/10/2021    IR NEPHROSTOGRAM EXISTING ACCESS  10/4/2021    IR NEPHROSTOMY EXCHANGE CATHETER  12/7/2021    IR NEPHROSTOMY EXCHANGE CATHETER  11/3/2021    IR NEPHROSTOMY EXCHANGE CATHETER  10/28/2021    IR NEPHROSTOMY PERCUTANEOUS LEFT  9/16/2021    IR NEPHROSTOMY PERCUTANEOUS LEFT  9/16/2021    IR NEPHROSTOMY PERCUTANEOUS LEFT 9/16/2021 [facility] SPECI[facility]    IR REPLCE T CVC WO PORT SAME ACC  5/30/2019    IR TUBE CHANGE  8/5/2021    IR TUBE CHANGE  7/8/2021    IR TUBE CHANGE  6/14/2021    IR TUBE CHANGE  5/27/2021    IR TUNNELED CATHETER PLACEMENT GREATER THAN 5 YEARS  5/20/2019    IR TUNNELED CATHETER PLACEMENT GREATER THAN 5 YEARS  5/20/2019    IR TUNNELED CATHETER PLACEMENT GREATER THAN 5 YEARS 5/20/2019 [facility] SPECI[facility]    OTHER SURGICAL HISTORY Bilateral     cataracts  2017    OTHER SURGICAL HISTORY  10/7/2013    Roberto---endorectal us    POLYPECTOMY      TOTAL COLECTOMY  11/2013    Roberto--lap LAR with coloproctostomy, mobilization splenic flexure, diverting loop ileostomy    TOTAL COLECTOMY Right 8/2014    for leak    VASCULAR SURGERY Left 4/14    single lumen port/subsequently removed      Prior to Admission medications    Medication Sig Start Date End Date Taking? Authorizing Provider   amitriptyline (ELAVIL) 50 MG tablet Take 1 tablet by mouth nightly  Patient taking differently: Take 100 mg by mouth nightly 10/23/22   Shellie Younger MD   clonazePAM (KLONOPIN) 2 MG tablet Take 2 tablets by mouth nightly as needed for Anxiety for up to 3 days.  10/23/22 10/26/22  Shellie Younger MD   carvedilol (COREG) 6.25 MG tablet Take 1 tablet by mouth 2 times daily (with meals) 9/6/22   Sosa Moseley,  acetaminophen (TYLENOL) 500 MG tablet Take 500 mg by mouth every 6 hours as needed for Pain  Patient not taking: Reported on 2022    Ar Automatic Reconciliation   ibuprofen (ADVIL;MOTRIN) 600 MG tablet Take 600 mg by mouth every 6 hours as needed for Pain  Patient not taking: Reported on 2022    Ar Automatic Reconciliation   levothyroxine (SYNTHROID) 125 MCG tablet TAKE ONE TABLET BY MOUTH ONE TIME DAILY BEFORE BREAKFAST 12/10/21   Ar Automatic Reconciliation   OLANZapine (ZYPREXA) 2.5 MG tablet Take 7.5 mg by mouth nightly    Ar Automatic Reconciliation       No Known Allergies     Review of Systems:  A comprehensive review of systems is negative except as stated in the HPI. Objective:     Visit Vitals  BP (!) 147/86   Pulse 88   Temp 98 °F (36.7 °C) (Oral)   Resp 15   Ht 5' 8.5\" (1.74 m)   Wt 123 lb 6.4 oz (56 kg)   SpO2 97%   BMI 18.49 kg/m²     Temp (24hrs), Av °F (36.7 °C), Min:97.7 °F (36.5 °C), Max:98.2 °F (36.8 °C)       Lines:  peripheral IV    Physical Exam:    General:  Alert, cooperative, appears stated age   Eyes:  Sclera anicteric. Pupils equally round and reactive to light. Mouth/Throat: NG tube in mouth to LIS. Mouth is dry    Neck: Supple   Lungs:   Clear to auscultation bilaterally, good effort   CV:  Regular rate and rhythm,no murmur, click, rub or gallop   Abdomen:   Soft. Ostomy in placed - light brown output in bag. Bowel sounds hypoactive    Extremities: No cyanosis or edema   Skin: Skin color, texture, turgor normal. no acute rash or lesions   Lymph nodes: Cervical and supraclavicular normal   Musculoskeletal: No swelling or deformity.     Lines/Devices:  Intact, no erythema, drainage or tenderness   Psych: Alert and oriented, normal mood affect given the setting       Data Review:     CBC:  Recent Labs     11/14/22  0428 11/15/22  0527   WBC 7.5 6.9   HGB 11.7* 10.6*   HCT 37.3* 34.2*    192       BMP:  Recent Labs     11/14/22  0428 11/15/22  0527   BUN 27* 18  136   K 4.2 4.0    102   CO2 26 29       LFTS:  Recent Labs     11/14/22  0428   ALT 33       Microbiology:   Reviewed    Imaging:   Reviewed     Signed By: VENTURA Cleveland     November 15, 2022

## 2022-11-15 NOTE — PROGRESS NOTES
Admit Date: 2022    General surgery following for SBO. Christiano Montalvo is a 76 y.o. male who we are asked in consultation by Bishop Sandoval NP (Hospitalist) to see for small bowel obstruction. The patient has a PMHx of rectal cancer (s/p total colectomy with ileostomy creation by Dr. Price Wilder ), chronic urinary catheter, hypothyroidism, anxiety, depression, insomnia, and OA. He presented 22 with one day onset of decreased ileostomy output with associated cramping abdominal pain 7/10 at worst. Associated symptoms included N without V. He was recently hospitalized 10/16-  for sudden onset BLE weakness, received IVIg and was followed by neurology. Pt  was dc'd to [facility] 10/27/22 and stated d/c from [facility] 11/10/22 in good health. CT A/P consistent with SBO. noted with large leukocytes in urine, started on Rocephin and urine culture ordered. He was admitted on 2022 for SBO. Pt not taking anticoagulation  Pt NGT LIS at time of consult  Previous abdominal surgeries: total colectomy with ileostomy creation by Dr. Price Wilder , hernia repairs x 2 (, )  Subjective:     Pt alert in bed with NAD noted. Respirations even and unlabored. NGT LIS in place. Denies abdominal pain, N, or V. Pt wants NGT out. Objective:       Vitals:    22 2348 11/15/22 0359 11/15/22 0748 11/15/22 1134   BP: 132/77 128/73 (!) 147/86 (!) 140/84   Pulse: 95 84 88 92   Resp: 18 14 15 16   Temp: 98.2 °F (36.8 °C) 97.7 °F (36.5 °C) 98 °F (36.7 °C) 97.4 °F (36.3 °C)   TempSrc: Oral Oral Oral Oral   SpO2: 93% 95% 97% 97%   Weight:       Height:           Temp (24hrs), Av.9 °F (36.6 °C), Min:97.4 °F (36.3 °C), Max:98.2 °F (36.8 °C)  . I&O reviewed as documented. John 1450 ml UOP past 24h   ml green output past 24h  Ostomy Bag with some air/flatus;no stool output    Physical Exam  Constitutional:       General: He is not in acute distress.      Appearance: Normal appearance. HENT:      Nose:      Comments: NGT     Mouth/Throat:      Mouth: Mucous membranes are dry. Cardiovascular:      Rate and Rhythm: Normal rate and regular rhythm. Pulses: Normal pulses. Heart sounds: Normal heart sounds. No murmur heard. No friction rub. No gallop. Pulmonary:      Effort: Pulmonary effort is normal. No respiratory distress. Breath sounds: Normal breath sounds. Abdominal:      General: Bowel sounds are normal. There is no distension. Palpations: Abdomen is soft. Comments: Ileostomy with pink healthy appearing stoma. Small amount air/gas in bag. No stool output    Genitourinary:     Comments: John  Musculoskeletal:         General: No swelling. Cervical back: Normal range of motion. Skin:     General: Skin is warm and dry. Capillary Refill: Capillary refill takes less than 2 seconds. Neurological:      Mental Status: He is alert and oriented to person, place, and time.    Psychiatric:         Behavior: Behavior normal.        Labs:   Recent Results (from the past 24 hour(s))   Basic Metabolic Panel w/ Reflex to MG    Collection Time: 11/15/22  5:27 AM   Result Value Ref Range    Sodium 136 133 - 143 mmol/L    Potassium 4.0 3.5 - 5.1 mmol/L    Chloride 102 101 - 110 mmol/L    CO2 29 21 - 32 mmol/L    Anion Gap 5 2 - 11 mmol/L    Glucose 99 65 - 100 mg/dL    BUN 18 8 - 23 MG/DL    Creatinine 1.30 0.8 - 1.5 MG/DL    Est, Glom Filt Rate 56 (L) >60 ml/min/1.73m2    Calcium 9.1 8.3 - 10.4 MG/DL   CBC with Auto Differential    Collection Time: 11/15/22  5:27 AM   Result Value Ref Range    WBC 6.9 4.3 - 11.1 K/uL    RBC 3.67 (L) 4.23 - 5.6 M/uL    Hemoglobin 10.6 (L) 13.6 - 17.2 g/dL    Hematocrit 34.2 (L) 41.1 - 50.3 %    MCV 93.2 82 - 102 FL    MCH 28.9 26.1 - 32.9 PG    MCHC 31.0 (L) 31.4 - 35.0 g/dL    RDW 15.8 (H) 11.9 - 14.6 %    Platelets 779 061 - 716 K/uL    MPV 8.9 (L) 9.4 - 12.3 FL    nRBC 0.00 0.0 - 0.2 K/uL    Differential Type AUTOMATED      Seg Neutrophils 83 (H) 43 - 78 %    Lymphocytes 7 (L) 13 - 44 %    Monocytes 8 4.0 - 12.0 %    Eosinophils % 1 0.5 - 7.8 %    Basophils 1 0.0 - 2.0 %    Immature Granulocytes 0 0.0 - 5.0 %    Segs Absolute 5.7 1.7 - 8.2 K/UL    Absolute Lymph # 0.5 0.5 - 4.6 K/UL    Absolute Mono # 0.6 0.1 - 1.3 K/UL    Absolute Eos # 0.1 0.0 - 0.8 K/UL    Basophils Absolute 0.0 0.0 - 0.2 K/UL    Absolute Immature Granulocyte 0.0 0.0 - 0.5 K/UL   Phosphorus    Collection Time: 11/15/22  5:27 AM   Result Value Ref Range    Phosphorus 3.2 2.3 - 3.7 MG/DL   EKG 12 Lead    Collection Time: 11/15/22 10:14 AM   Result Value Ref Range    Ventricular Rate 90 BPM    Atrial Rate 90 BPM    P-R Interval 180 ms    QRS Duration 114 ms    Q-T Interval 346 ms    QTc Calculation (Bazett) 423 ms    P Axis 50 degrees    R Axis 62 degrees    T Axis 34 degrees    Diagnosis Normal sinus rhythm    Troponin    Collection Time: 11/15/22 10:49 AM   Result Value Ref Range    Troponin, High Sensitivity 26.7 (H) 0 - 14 pg/mL         Assessment:     Principal Problem:    SBO (small bowel obstruction) (HCC)  Active Problems:    Mild episode of recurrent major depressive disorder (HCC)    Colostomy complication, unspecified (HCC)    Rectal cancer (HCC)    Hypothyroidism    Urinary tract infection associated with indwelling urethral catheter ([address]  Resolved Problems:    * No resolved hospital problems. *        Plan/Recommendations/Medical Decision Making:     Continue NGT LIS.  Clamp for miralax BID  Labs unremarkable  Afebrile  VSS   GERMAN Henao NP

## 2022-11-15 NOTE — PROGRESS NOTES
Responded to raid response called overhead in Observation this morning. Provided a supportive presence in hallway as staff provided patient care. Mr. Nilo No has transferred to room 628 in order to monitor his heart. Chaplains remain available as needed.      Paul Jones 66  Board Certified

## 2022-11-15 NOTE — PLAN OF CARE
Problem: Discharge Planning  Goal: Discharge to home or other facility with appropriate resources  Outcome: Progressing     Problem: Skin/Tissue Integrity  Goal: Absence of new skin breakdown  Description: 1. Monitor for areas of redness and/or skin breakdown  2. Assess vascular access sites hourly  3. Every 4-6 hours minimum:  Change oxygen saturation probe site  4. Every 4-6 hours:  If on nasal continuous positive airway pressure, respiratory therapy assess nares and determine need for appliance change or resting period.   Outcome: Progressing     Problem: Safety - Adult  Goal: Free from fall injury  Outcome: Progressing  Flowsheets (Taken 11/14/2022 2033)  Free From Fall Injury: Instruct family/caregiver on patient safety

## 2022-11-15 NOTE — PROGRESS NOTES
Rapid Response Team Note      Subjective: Responded to Rapid Response secondary to syncopal episode. Summary: Patient now resting in bed alert. Per primary RN patient was ambulating in the perez when he stated he was lightheaded and needed to sit down. He then lost consciousness and was slowly lowered to chair. Upon arrival to the room patient is alert and oriented. He denies ever losing consciousness. BP stable at 114/73 with HR of 93 on lifepak showing NSR. Respirations even and unlabored. Noted to be hypoxic with O2 saturation of 86, patient placed on 6L NC with O2 saturation now 98 and weaned down to 2L. Dr. Durga Villafuerte at bedside, orders placed for stat EKG, Chest X-ray, remote telemetry, and transfer. See Rapid Response/Code Blue Narrator for full documentation    Outcome:  Patient transferring to floor with remote telemetry capabilities.     Elenita Conodn RN  [address] [facility]: [facility]: [phone]

## 2022-11-15 NOTE — PROGRESS NOTES
Physical Therapy Note:    Attempted to see patient for physical therapy treatment session. Pt had RR called this AM due to syncopal episode. RN reports to therapist that pt is on bedrest until further workup is performed. Will follow and re-attempt as schedule permits pending pt availability and medical status.     Thank you,  Alberto Murillo, PT, DPT

## 2022-11-15 NOTE — PROGRESS NOTES
Pt is stable with bed in lowest position, wheels locked, and call light within reach. Hourly rounds completed. VSS. IV is patent and dressing is clean, dry, and intact. NGT on LIMS and draining. Report to be given to day shift nurse.

## 2022-11-15 NOTE — PROGRESS NOTES
Hospitalist Progress Note   Admit Date:  2022  5:41 AM   Name:  Magen Nolasco   Age:  76 y.o. Sex:  male  :  1945   MRN:  [id number]   Room:  [facility]/    Presenting Complaint: No chief complaint on file. Reason(s) for Admission: Small bowel obstruction ([facility] [address] [K56.609]  SBO (small bowel obstruction) [facility]) [address] [place] [address] Course:   Uzma Brooks is a 76 y.o. 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 He was recently hospitalized 10/16-10/27 for LE weakness, received IVIG and followed by neuro, he was dc'd to [facility] from there 11/10 in good health. CT A/P consistent with SBO. 2 sets blood cultures drawn in ED, noted with large leukocytes in urine, started on Rocephin and urine culture ordered. Of note, he is noted with frequent UTIs, has urostomy. Subjective & 24hr Events (11/15/22): Rapid response called as pt was walking, c/o dizziness , wanted to sit, then had a syncopal episode. Came back to consciousness in a minute? Found to be hypoxic  Says he didn't passout- staff at the bedside say he completely passed out. No chest pain or shortness of breath or dizziness or nausea or vomiting- wanted to know if his ng tube could come out. Assessment & Plan:     Syncope  ?  Etiology  Ordered troponin, ekg,chestxray  Trop 26.7,ekg- ectopic p wave, non specific t wave inversion anterior leads  Cxr- atelectasis and or infiltrate-- will add flutter valve  Will trend troponin, move to room where he can be monitered with telemetry    SBO  Has ng tube  Surgery following  Not much out put    Prob uti/chronic indwelling catheter/h/o ureteral stent to be replaced in dec 2022  ID dced antibiotics    H/o ostomy- h/o s/p sigmoidectomy  Not much out put from ostomy    H/o depression, rectal cancer ,hypothyroidism    Heparin  Full code      Hospital Problems:  Principal Problem:    SBO (small bowel obstruction) (HCC)  Active Problems:    Mild episode of recurrent major depressive disorder (HCC)    Colostomy complication, unspecified ([facility] [address]    Syncope    Rectal cancer ([facility] [address]    Hypothyroidism    Urinary tract infection associated with indwelling urethral catheter ([facility] [address]  Resolved Problems:    * No resolved hospital problems. *      Objective:   Patient Vitals for the past 24 hrs:   Temp Pulse Resp BP SpO2   11/15/22 1540 97.2 °F (36.2 °C) 92 16 (!) 143/95 96 %   11/15/22 1338 98.7 °F (37.1 °C) 88 16 (!) 145/85 92 %   11/15/22 1134 97.4 °F (36.3 °C) 92 16 (!) 140/84 97 %   11/15/22 0748 98 °F (36.7 °C) 88 15 (!) 147/86 97 %   11/15/22 0359 97.7 °F (36.5 °C) 84 14 128/73 95 %   11/14/22 2348 98.2 °F (36.8 °C) 95 18 132/77 93 %   11/14/22 2030 98 °F (36.7 °C) 73 17 (!) 170/87 97 %   11/14/22 1927 -- 96 25 (!) 147/89 92 %   11/14/22 1857 -- 86 16 130/88 96 %       Oxygen Therapy  SpO2: 96 %  Pulse Oximeter Device Mode: Intermittent  O2 Device: None (Room air)  O2 Flow Rate (L/min): 2 L/min    Estimated body mass index is 18.49 kg/m² as calculated from the following:    Height as of this encounter: 5' 8.5\" (1.74 m). Weight as of this encounter: 123 lb 6.4 oz (56 kg). Intake/Output Summary (Last 24 hours) at 11/15/2022 1840  Last data filed at 11/15/2022 1616  Gross per 24 hour   Intake --   Output 2600 ml   Net -2600 ml         Physical Exam:     Blood pressure (!) 143/95, pulse 92, temperature 97.2 °F (36.2 °C), temperature source Oral, resp. rate 16, height 5' 8.5\" (1.74 m), weight 123 lb 6.4 oz (56 kg), SpO2 96 %. General:    Well nourished. Ng tube, on oxygen  Head:  Normocephalic, atraumatic  Eyes:  Sclerae appear normal.  Pupils equally round. ENT:  Nares appear normal, no drainage. Moist oral mucosa  Neck:  No restricted ROM. Trachea midline   CV:   RRR. No m/r/g.   No jugular venous distension. Lungs:   CTAB. No wheezing, rhonchi, or rales. Symmetric expansion. Abdomen: Bowel sounds present. Soft, nontender, nondistended. Extremities: No cyanosis or clubbing. No edema  Skin:     No rashes and normal coloration. Warm and dry. Neuro:  CN II-XII grossly intact. Sensation intact. A&Ox3  Psych:  Normal mood and affect.       I have personally reviewed labs and tests showing:  Recent Labs:  Recent Results (from the past 48 hour(s))   CBC with Diff    Collection Time: 11/14/22  4:28 AM   Result Value Ref Range    WBC 7.5 4.3 - 11.1 K/uL    RBC 4.01 (L) 4.23 - 5.6 M/uL    Hemoglobin 11.7 (L) 13.6 - 17.2 g/dL    Hematocrit 37.3 (L) 41.1 - 50.3 %    MCV 93.0 82 - 102 FL    MCH 29.2 26.1 - 32.9 PG    MCHC 31.4 31.4 - 35.0 g/dL    RDW 15.8 (H) 11.9 - 14.6 %    Platelets 738 601 - 436 K/uL    MPV 9.4 9.4 - 12.3 FL    nRBC 0.00 0.0 - 0.2 K/uL    Differential Type AUTOMATED      Seg Neutrophils 84 (H) 43 - 78 %    Lymphocytes 7 (L) 13 - 44 %    Monocytes 7 4.0 - 12.0 %    Eosinophils % 1 0.5 - 7.8 %    Basophils 1 0.0 - 2.0 %    Immature Granulocytes 0 0.0 - 5.0 %    Segs Absolute 6.4 1.7 - 8.2 K/UL    Absolute Lymph # 0.5 0.5 - 4.6 K/UL    Absolute Mono # 0.5 0.1 - 1.3 K/UL    Absolute Eos # 0.1 0.0 - 0.8 K/UL    Basophils Absolute 0.0 0.0 - 0.2 K/UL    Absolute Immature Granulocyte 0.0 0.0 - 0.5 K/UL   CMP    Collection Time: 11/14/22  4:28 AM   Result Value Ref Range    Sodium 136 133 - 143 mmol/L    Potassium 4.2 3.5 - 5.1 mmol/L    Chloride 104 101 - 110 mmol/L    CO2 26 21 - 32 mmol/L    Anion Gap 6 2 - 11 mmol/L    Glucose 121 (H) 65 - 100 mg/dL    BUN 27 (H) 8 - 23 MG/DL    Creatinine 1.50 0.8 - 1.5 MG/DL    Est, Glom Filt Rate 47 (L) >60 ml/min/1.73m2    Calcium 9.1 8.3 - 10.4 MG/DL    Total Bilirubin 0.4 0.2 - 1.1 MG/DL    ALT 33 12 - 65 U/L    AST 28 15 - 37 U/L    Alk Phosphatase 67 50 - 136 U/L    Total Protein 7.5 6.3 - 8.2 g/dL    Albumin 3.3 3.2 - 4.6 g/dL    Globulin 4.2 2.8 - 4.5 g/dL    Albumin/Globulin Ratio 0.8 0.4 - 1.6     Lipase    Collection Time: 11/14/22  4:28 AM   Result Value Ref Range    Lipase 164 73 - 393 U/L   POCT Urinalysis no Micro    Collection Time: 11/14/22  6:09 AM   Result Value Ref Range    Specific Gravity, Urine, POC 1.025 (H) 1.001 - 1.023      pH, Urine, POC 5.5 5.0 - 9.0      Protein, Urine,  (A) NEG mg/dL    Glucose, UA POC Negative NEG mg/dL    Ketones, Urine, POC Negative NEG mg/dL    Bilirubin, Urine, POC Negative NEG      Blood, UA POC LARGE (A) NEG      URINE UROBILINOGEN POC 0.2 0.2 - 1.0 EU/dL    Nitrate, Urine, POC Negative NEG      Leukocyte Est, UA POC LARGE (A) NEG     Urinalysis w rflx microscopic    Collection Time: 11/14/22  6:15 AM   Result Value Ref Range    Color, UA YELLOW/STRAW      Appearance TURBID      Specific Gravity, UA 1.012 1.001 - 1.023      pH, Urine 5.0 5.0 - 9.0      Protein,  (A) NEG mg/dL    Glucose, UA Negative mg/dL    Ketones, Urine Negative NEG mg/dL    Bilirubin Urine Negative NEG      Blood, Urine LARGE (A) NEG      Urobilinogen, Urine 0.2 0.2 - 1.0 EU/dL    Nitrite, Urine Negative NEG      Leukocyte Esterase, Urine LARGE (A) NEG      WBC, UA >100 0 /hpf    RBC, UA  0 /hpf    Epithelial Cells UA 0-3 0 /hpf    BACTERIA, URINE 3+ (H) 0 /hpf    Yeast, UA MODERATE      Other observations RESULTS VERIFIED MANUALLY     Culture, Urine    Collection Time: 11/14/22  6:15 AM    Specimen: Urine, clean catch   Result Value Ref Range    Special Requests NO SPECIAL REQUESTS      Culture (A)       >100,000 COLONIES/mL PRESUMPTIVE ENTEROCOCCUS SPECIES IDENTIFICATION AND SUSCEPTIBILITY TO FOLLOW   Culture, Blood 1    Collection Time: 11/14/22  9:27 AM    Specimen: Blood   Result Value Ref Range    Special Requests RIGHT  FOREARM        Culture NO GROWTH AFTER 21 HOURS     Culture, Blood 1    Collection Time: 11/14/22  9:30 AM    Specimen: Blood   Result Value Ref Range    Special Requests RIGHT  HAND        Culture NO GROWTH AFTER 21 HOURS     Lactic Acid    Collection Time: 11/14/22  9:30 AM   Result Value Ref Range    Lactic Acid, Plasma 1.1 0.4 - 2.0 MMOL/L   Basic Metabolic Panel w/ Reflex to MG    Collection Time: 11/15/22  5:27 AM   Result Value Ref Range    Sodium 136 133 - 143 mmol/L    Potassium 4.0 3.5 - 5.1 mmol/L    Chloride 102 101 - 110 mmol/L    CO2 29 21 - 32 mmol/L    Anion Gap 5 2 - 11 mmol/L    Glucose 99 65 - 100 mg/dL    BUN 18 8 - 23 MG/DL    Creatinine 1.30 0.8 - 1.5 MG/DL    Est, Glom Filt Rate 56 (L) >60 ml/min/1.73m2    Calcium 9.1 8.3 - 10.4 MG/DL   CBC with Auto Differential    Collection Time: 11/15/22  5:27 AM   Result Value Ref Range    WBC 6.9 4.3 - 11.1 K/uL    RBC 3.67 (L) 4.23 - 5.6 M/uL    Hemoglobin 10.6 (L) 13.6 - 17.2 g/dL    Hematocrit 34.2 (L) 41.1 - 50.3 %    MCV 93.2 82 - 102 FL    MCH 28.9 26.1 - 32.9 PG    MCHC 31.0 (L) 31.4 - 35.0 g/dL    RDW 15.8 (H) 11.9 - 14.6 %    Platelets 938 647 - 923 K/uL    MPV 8.9 (L) 9.4 - 12.3 FL    nRBC 0.00 0.0 - 0.2 K/uL    Differential Type AUTOMATED      Seg Neutrophils 83 (H) 43 - 78 %    Lymphocytes 7 (L) 13 - 44 %    Monocytes 8 4.0 - 12.0 %    Eosinophils % 1 0.5 - 7.8 %    Basophils 1 0.0 - 2.0 %    Immature Granulocytes 0 0.0 - 5.0 %    Segs Absolute 5.7 1.7 - 8.2 K/UL    Absolute Lymph # 0.5 0.5 - 4.6 K/UL    Absolute Mono # 0.6 0.1 - 1.3 K/UL    Absolute Eos # 0.1 0.0 - 0.8 K/UL    Basophils Absolute 0.0 0.0 - 0.2 K/UL    Absolute Immature Granulocyte 0.0 0.0 - 0.5 K/UL   Phosphorus    Collection Time: 11/15/22  5:27 AM   Result Value Ref Range    Phosphorus 3.2 2.3 - 3.7 MG/DL   EKG 12 Lead    Collection Time: 11/15/22 10:14 AM   Result Value Ref Range    Ventricular Rate 90 BPM    Atrial Rate 90 BPM    P-R Interval 180 ms    QRS Duration 114 ms    Q-T Interval 346 ms    QTc Calculation (Bazett) 423 ms    P Axis 50 degrees    R Axis 62 degrees    T Axis 34 degrees    Diagnosis Normal sinus rhythm    Troponin    Collection Time: 11/15/22 10:49 AM   Result Value Ref Range    Troponin, High Sensitivity 26.7 (H) 0 - 14 pg/mL   Troponin    Collection Time: 11/15/22  2:12 PM   Result Value Ref Range    Troponin, High Sensitivity 259.8 (HH) 0 - 14 pg/mL   Troponin    Collection Time: 11/15/22  4:52 PM   Result Value Ref Range    Troponin, High Sensitivity 394.5 (HH) 0 - 14 pg/mL       I have personally reviewed imaging studies showing: Other Studies:  XR ABDOMEN (KUB) (SINGLE AP VIEW)   Final Result   1. Persistent dilated small bowel loops within the right hemiabdomen compatible   with persistent bowel obstruction. XR CHEST 1 VIEW   Final Result   1. Elevation of left hemidiaphragm with associated basilar subsegmental   atelectasis and/or infiltrates. XR ABDOMEN (KUB) (SINGLE AP VIEW)   Final Result   The bowel gas pattern is normal. There is no evidence of   obstruction. There is no free air visualized on this supine view. The lung bases   are clear. Residual oral contrast noted in the stomach. Nasogastric tube   terminates in the mid stomach below the left hemidiaphragm. Right ureteral stent   in place. CT ABDOMEN PELVIS W IV CONTRAST Additional Contrast? None   Final Result      1. Findings compatible with small bowel obstruction. Multiple dilated loops of   small bowel seen with a \"small bowel feces sign\" within the central abdomen. There are some decompressed loops of bowel within the left midabdomen, though a   distinct transition point is not identified. 2. Findings are otherwise unchanged compared with the reference exam.               XR ABDOMEN (KUB) (SINGLE AP VIEW)   Final Result   There are dilated loops of bowel noted in the right abdomen. It is difficult to   differentiate between large and small bowel. Bowel obstruction is not excluded. Stool is seen in the left upper quadrant.                       Current Meds:  Current Facility-Administered Medications   Medication Dose Route Frequency    metoprolol (LOPRESSOR) injection 2.5 mg  2.5 mg IntraVENous Q6H PRN    heparin (porcine) injection 5,000 Units  5,000 Units SubCUTAneous 3 times per day    aspirin chewable tablet 81 mg  81 mg Per NG tube Once    0.9 % sodium chloride bolus  100 mL IntraVENous Once    sodium chloride flush 0.9 % injection 5-40 mL  5-40 mL IntraVENous 2 times per day    sodium chloride flush 0.9 % injection 5-40 mL  5-40 mL IntraVENous PRN    0.9 % sodium chloride infusion   IntraVENous PRN    ondansetron (ZOFRAN-ODT) disintegrating tablet 4 mg  4 mg Oral Q8H PRN    Or    ondansetron (ZOFRAN) injection 4 mg  4 mg IntraVENous Q6H PRN    polyethylene glycol (GLYCOLAX) packet 17 g  17 g Oral Daily PRN    acetaminophen (TYLENOL) tablet 650 mg  650 mg Oral Q6H PRN    Or    acetaminophen (TYLENOL) suppository 650 mg  650 mg Rectal Q6H PRN    pantoprazole (PROTONIX) 40 mg in sodium chloride (PF) 0.9 % 10 mL injection  40 mg IntraVENous Q12H    lactated ringers infusion   IntraVENous Continuous    polyethylene glycol (GLYCOLAX) packet 17 g  17 g Oral BID    amitriptyline (ELAVIL) tablet 100 mg  100 mg Per NG tube Nightly    clonazePAM (KLONOPIN) tablet 4 mg  4 mg Per NG tube Nightly    OLANZapine (ZYPREXA) tablet 7.5 mg  7.5 mg Per NG tube Nightly    traMADol (ULTRAM) tablet 50 mg  50 mg Per NG tube Q6H PRN       Signed:  Hillary Caicedo MD

## 2022-11-15 NOTE — PROGRESS NOTES
Trending up troponin. Pt sitting in chair - says having ostomy out out, says no abdominal cramping, asking if the ng tube can come off. Otherwise no chest pain or shortness of breath or dizziness. Vitals stable  HEENT- has ng tube. Pupils normal  Resp- equal air bilaterally, mild coarse breath sounds  Cvs- s1s2 heard   P/a- soft nontender , has out put from ostomy  Cns- no focal neurological deficits    A/p- h/o syncope this afternoon- trending up troponin- pt asyptomatic  - will order repeat EKG  - Cont trending troponin.  -will order echo for morning.

## 2022-11-15 NOTE — PROGRESS NOTES
TRANSFER - IN REPORT:    Verbal report received from [employer] Jordi on Davis Broadkayce  being received from ED Overflow for routine progression of patient care      Report consisted of patient's Situation, Background, Assessment and   Recommendations(SBAR). Information from the following report(s) Nurse Handoff Report, ED Encounter Summary, ED SBAR, Adult Overview, Intake/Output, MAR, Recent Results, and Med Rec Status was reviewed with the receiving nurse. Opportunity for questions and clarification was provided. Assessment completed upon patient's arrival to unit and care assumed.  Waiting on transport

## 2022-11-16 ENCOUNTER — APPOINTMENT (OUTPATIENT)
Dept: NON INVASIVE DIAGNOSTICS | Age: 77
DRG: 388 | End: 2022-11-16
Payer: MEDICARE

## 2022-11-16 PROBLEM — R77.8 ELEVATED TROPONIN: Status: ACTIVE | Noted: 2022-11-16

## 2022-11-16 PROBLEM — R79.89 ELEVATED TROPONIN: Status: ACTIVE | Noted: 2022-11-16

## 2022-11-16 LAB
ANION GAP SERPL CALC-SCNC: 5 MMOL/L (ref 2–11)
BASOPHILS # BLD: 0 K/UL (ref 0–0.2)
BASOPHILS NFR BLD: 1 % (ref 0–2)
BUN SERPL-MCNC: 14 MG/DL (ref 8–23)
CALCIUM SERPL-MCNC: 8.4 MG/DL (ref 8.3–10.4)
CHLORIDE SERPL-SCNC: 104 MMOL/L (ref 101–110)
CO2 SERPL-SCNC: 29 MMOL/L (ref 21–32)
CREAT SERPL-MCNC: 1.3 MG/DL (ref 0.8–1.5)
DIFFERENTIAL METHOD BLD: ABNORMAL
ECHO AO ROOT DIAM: 3.7 CM
ECHO AO ROOT INDEX: 2.22 CM/M2
ECHO AV AREA PEAK VELOCITY: 2.1 CM2
ECHO AV AREA VTI: 2.7 CM2
ECHO AV AREA/BSA PEAK VELOCITY: 1.3 CM2/M2
ECHO AV AREA/BSA VTI: 1.6 CM2/M2
ECHO AV MEAN GRADIENT: 3 MMHG
ECHO AV MEAN VELOCITY: 0.8 M/S
ECHO AV PEAK GRADIENT: 7 MMHG
ECHO AV PEAK VELOCITY: 1.3 M/S
ECHO AV VELOCITY RATIO: 0.54
ECHO AV VTI: 22.5 CM
ECHO BSA: 1.64 M2
ECHO EST RA PRESSURE: 3 MMHG
ECHO IVC PROX: 1.9 CM
ECHO LA DIAMETER INDEX: 2.22 CM/M2
ECHO LA DIAMETER: 3.7 CM
ECHO LA TO AORTIC ROOT RATIO: 1
ECHO LV E' LATERAL VELOCITY: 18 CM/S
ECHO LV E' SEPTAL VELOCITY: 7 CM/S
ECHO LV FRACTIONAL SHORTENING: 28 % (ref 28–44)
ECHO LV INTERNAL DIMENSION DIASTOLE INDEX: 2.75 CM/M2
ECHO LV INTERNAL DIMENSION DIASTOLIC: 4.6 CM (ref 4.2–5.9)
ECHO LV INTERNAL DIMENSION SYSTOLIC INDEX: 1.98 CM/M2
ECHO LV INTERNAL DIMENSION SYSTOLIC: 3.3 CM
ECHO LV IVSD: 0.8 CM (ref 0.6–1)
ECHO LV MASS 2D: 127.7 G (ref 88–224)
ECHO LV MASS INDEX 2D: 76.4 G/M2 (ref 49–115)
ECHO LV POSTERIOR WALL DIASTOLIC: 0.9 CM (ref 0.6–1)
ECHO LV RELATIVE WALL THICKNESS RATIO: 0.39
ECHO LVOT AREA: 3.8 CM2
ECHO LVOT AV VTI INDEX: 0.7
ECHO LVOT DIAM: 2.2 CM
ECHO LVOT MEAN GRADIENT: 1 MMHG
ECHO LVOT PEAK GRADIENT: 2 MMHG
ECHO LVOT PEAK VELOCITY: 0.7 M/S
ECHO LVOT STROKE VOLUME INDEX: 35.9 ML/M2
ECHO LVOT SV: 60 ML
ECHO LVOT VTI: 15.8 CM
ECHO MV A VELOCITY: 0.81 M/S
ECHO MV E DECELERATION TIME (DT): 238 MS
ECHO MV E VELOCITY: 0.52 M/S
ECHO MV E/A RATIO: 0.64
ECHO MV E/E' LATERAL: 2.89
ECHO MV E/E' RATIO (AVERAGED): 5.16
ECHO MV E/E' SEPTAL: 7.43
ECHO PV ACCELERATION TIME (AT): 82 MS
ECHO RIGHT VENTRICULAR SYSTOLIC PRESSURE (RVSP): 27 MMHG
ECHO RV FREE WALL PEAK S': 12 CM/S
ECHO RV TAPSE: 1.9 CM (ref 1.7–?)
ECHO TV REGURGITANT MAX VELOCITY: 2.44 M/S
ECHO TV REGURGITANT PEAK GRADIENT: 24 MMHG
EKG ATRIAL RATE: 87 BPM
EKG DIAGNOSIS: NORMAL
EKG P AXIS: 49 DEGREES
EKG P-R INTERVAL: 174 MS
EKG Q-T INTERVAL: 394 MS
EKG QRS DURATION: 114 MS
EKG QTC CALCULATION (BAZETT): 474 MS
EKG R AXIS: 84 DEGREES
EKG T AXIS: 2 DEGREES
EKG VENTRICULAR RATE: 87 BPM
EOSINOPHIL # BLD: 0.1 K/UL (ref 0–0.8)
EOSINOPHIL NFR BLD: 2 % (ref 0.5–7.8)
ERYTHROCYTE [DISTWIDTH] IN BLOOD BY AUTOMATED COUNT: 15.6 % (ref 11.9–14.6)
GLUCOSE SERPL-MCNC: 83 MG/DL (ref 65–100)
HCT VFR BLD AUTO: 33.3 % (ref 41.1–50.3)
HGB BLD-MCNC: 10.5 G/DL (ref 13.6–17.2)
IMM GRANULOCYTES # BLD AUTO: 0 K/UL (ref 0–0.5)
IMM GRANULOCYTES NFR BLD AUTO: 1 % (ref 0–5)
LV EF: 63 %
LVEF MODALITY: NORMAL
LYMPHOCYTES # BLD: 0.5 K/UL (ref 0.5–4.6)
LYMPHOCYTES NFR BLD: 10 % (ref 13–44)
MCH RBC QN AUTO: 29.4 PG (ref 26.1–32.9)
MCHC RBC AUTO-ENTMCNC: 31.5 G/DL (ref 31.4–35)
MCV RBC AUTO: 93.3 FL (ref 82–102)
MONOCYTES # BLD: 0.5 K/UL (ref 0.1–1.3)
MONOCYTES NFR BLD: 11 % (ref 4–12)
NEUTS SEG # BLD: 3.5 K/UL (ref 1.7–8.2)
NEUTS SEG NFR BLD: 75 % (ref 43–78)
NRBC # BLD: 0 K/UL (ref 0–0.2)
PHOSPHATE SERPL-MCNC: 2.9 MG/DL (ref 2.3–3.7)
PLATELET # BLD AUTO: 146 K/UL (ref 150–450)
PLATELET COMMENT: ADEQUATE
PMV BLD AUTO: 10.3 FL (ref 9.4–12.3)
POTASSIUM SERPL-SCNC: 3.8 MMOL/L (ref 3.5–5.1)
RBC # BLD AUTO: 3.57 M/UL (ref 4.23–5.6)
RBC MORPH BLD: ABNORMAL
SODIUM SERPL-SCNC: 138 MMOL/L (ref 133–143)
TROPONIN I SERPL HS-MCNC: 275.4 PG/ML (ref 0–14)
TROPONIN I SERPL HS-MCNC: 292.7 PG/ML (ref 0–14)
TROPONIN I SERPL HS-MCNC: 401.5 PG/ML (ref 0–14)
WBC # BLD AUTO: 4.6 K/UL (ref 4.3–11.1)
WBC MORPH BLD: ABNORMAL

## 2022-11-16 PROCEDURE — 97112 NEUROMUSCULAR REEDUCATION: CPT

## 2022-11-16 PROCEDURE — 93306 TTE W/DOPPLER COMPLETE: CPT

## 2022-11-16 PROCEDURE — 99223 1ST HOSP IP/OBS HIGH 75: CPT | Performed by: INTERNAL MEDICINE

## 2022-11-16 PROCEDURE — 97165 OT EVAL LOW COMPLEX 30 MIN: CPT

## 2022-11-16 PROCEDURE — 6360000002 HC RX W HCPCS: Performed by: FAMILY MEDICINE

## 2022-11-16 PROCEDURE — 6370000000 HC RX 637 (ALT 250 FOR IP): Performed by: INTERNAL MEDICINE

## 2022-11-16 PROCEDURE — 84484 ASSAY OF TROPONIN QUANT: CPT

## 2022-11-16 PROCEDURE — 97530 THERAPEUTIC ACTIVITIES: CPT

## 2022-11-16 PROCEDURE — 1100000003 HC PRIVATE W/ TELEMETRY

## 2022-11-16 PROCEDURE — 6360000002 HC RX W HCPCS: Performed by: NURSE PRACTITIONER

## 2022-11-16 PROCEDURE — 85025 COMPLETE CBC W/AUTO DIFF WBC: CPT

## 2022-11-16 PROCEDURE — 36415 COLL VENOUS BLD VENIPUNCTURE: CPT

## 2022-11-16 PROCEDURE — 93306 TTE W/DOPPLER COMPLETE: CPT | Performed by: INTERNAL MEDICINE

## 2022-11-16 PROCEDURE — 2580000003 HC RX 258: Performed by: NURSE PRACTITIONER

## 2022-11-16 PROCEDURE — 51702 INSERT TEMP BLADDER CATH: CPT

## 2022-11-16 PROCEDURE — 6370000000 HC RX 637 (ALT 250 FOR IP): Performed by: STUDENT IN AN ORGANIZED HEALTH CARE EDUCATION/TRAINING PROGRAM

## 2022-11-16 PROCEDURE — A4216 STERILE WATER/SALINE, 10 ML: HCPCS | Performed by: NURSE PRACTITIONER

## 2022-11-16 PROCEDURE — 80048 BASIC METABOLIC PNL TOTAL CA: CPT

## 2022-11-16 PROCEDURE — C9113 INJ PANTOPRAZOLE SODIUM, VIA: HCPCS | Performed by: NURSE PRACTITIONER

## 2022-11-16 PROCEDURE — 84100 ASSAY OF PHOSPHORUS: CPT

## 2022-11-16 PROCEDURE — 97535 SELF CARE MNGMENT TRAINING: CPT

## 2022-11-16 RX ORDER — CARVEDILOL 3.12 MG/1
6.25 TABLET ORAL 2 TIMES DAILY WITH MEALS
Status: DISCONTINUED | OUTPATIENT
Start: 2022-11-16 | End: 2022-11-18 | Stop reason: HOSPADM

## 2022-11-16 RX ORDER — OLANZAPINE 2.5 MG/1
7.5 TABLET ORAL NIGHTLY
Status: DISCONTINUED | OUTPATIENT
Start: 2022-11-16 | End: 2022-11-18 | Stop reason: HOSPADM

## 2022-11-16 RX ORDER — AMITRIPTYLINE HYDROCHLORIDE 50 MG/1
100 TABLET, FILM COATED ORAL NIGHTLY
Status: DISCONTINUED | OUTPATIENT
Start: 2022-11-16 | End: 2022-11-18 | Stop reason: HOSPADM

## 2022-11-16 RX ORDER — CLONAZEPAM 1 MG/1
4 TABLET ORAL NIGHTLY PRN
Status: DISCONTINUED | OUTPATIENT
Start: 2022-11-16 | End: 2022-11-18 | Stop reason: HOSPADM

## 2022-11-16 RX ADMIN — SODIUM CHLORIDE 40 MG: 9 INJECTION, SOLUTION INTRAMUSCULAR; INTRAVENOUS; SUBCUTANEOUS at 22:06

## 2022-11-16 RX ADMIN — CARVEDILOL 6.25 MG: 3.12 TABLET, FILM COATED ORAL at 17:17

## 2022-11-16 RX ADMIN — HEPARIN SODIUM 5000 UNITS: 5000 INJECTION INTRAVENOUS; SUBCUTANEOUS at 22:06

## 2022-11-16 RX ADMIN — SODIUM CHLORIDE, PRESERVATIVE FREE 10 ML: 5 INJECTION INTRAVENOUS at 08:53

## 2022-11-16 RX ADMIN — SODIUM CHLORIDE 40 MG: 9 INJECTION, SOLUTION INTRAMUSCULAR; INTRAVENOUS; SUBCUTANEOUS at 08:53

## 2022-11-16 RX ADMIN — SODIUM CHLORIDE, POTASSIUM CHLORIDE, SODIUM LACTATE AND CALCIUM CHLORIDE: 600; 310; 30; 20 INJECTION, SOLUTION INTRAVENOUS at 03:20

## 2022-11-16 RX ADMIN — CLONAZEPAM 4 MG: 1 TABLET ORAL at 22:05

## 2022-11-16 RX ADMIN — AMITRIPTYLINE HYDROCHLORIDE 100 MG: 50 TABLET, FILM COATED ORAL at 22:03

## 2022-11-16 RX ADMIN — HEPARIN SODIUM 5000 UNITS: 5000 INJECTION INTRAVENOUS; SUBCUTANEOUS at 06:00

## 2022-11-16 RX ADMIN — SODIUM CHLORIDE, POTASSIUM CHLORIDE, SODIUM LACTATE AND CALCIUM CHLORIDE: 600; 310; 30; 20 INJECTION, SOLUTION INTRAVENOUS at 14:38

## 2022-11-16 RX ADMIN — HEPARIN SODIUM 5000 UNITS: 5000 INJECTION INTRAVENOUS; SUBCUTANEOUS at 14:38

## 2022-11-16 RX ADMIN — SODIUM CHLORIDE, PRESERVATIVE FREE 10 ML: 5 INJECTION INTRAVENOUS at 22:20

## 2022-11-16 RX ADMIN — OLANZAPINE 7.5 MG: 2.5 TABLET, FILM COATED ORAL at 22:04

## 2022-11-16 NOTE — PROGRESS NOTES
RRT Clinical Rounding Nurse Progress Report      SUBJECTIVE: Called to assess patient secondary to recent rapid response. Vitals:    11/15/22 2347 11/16/22 0418 11/16/22 0716 11/16/22 0729   BP: 122/74 138/84  (!) 159/86   Pulse: 83 75 84 83   Resp: 20 18 16   Temp: 98.2 °F (36.8 °C) 97.2 °F (36.2 °C)  98.1 °F (36.7 °C)   TempSrc: Axillary Oral  Oral   SpO2: 93% 94%  97%   Weight:       Height:            DETERIORATION INDEX SCORE: 22      ASSESSMENT:  Patient is alert and oriented. Denies pain or SOB. Patient states that he feels better and would like to discuss with his attending provider when he can go home or to rehab. These concerns addressed with primary RN. 300mL of dark output from NGT noted in suction canister. VS, labs, and progress notes reviewed. No concerns per primary RN. PLAN:  Will follow per RRT Clinical Rounding Program protocol.     Sandra Hunt RN  [facility]: [facility]: [phone]

## 2022-11-16 NOTE — PROGRESS NOTES
ACUTE PHYSICAL THERAPY GOALS:   (Developed with and agreed upon by patient and/or caregiver. )  LTG:  (1.)Mr. Nolasco will move from supine to sit and sit to supine , scoot up and down, and roll side to side in bed with INDEPENDENCE within 5 treatment day(s). (2.)Mr. Nolasco will transfer from bed to chair and chair to bed with INDEPENDENCE using the least restrictive device within 7 treatment day(s). (3.)Mr. Nolasco will ambulate with INDEPENDENCE for a minimum of 500 feet with the least restrictive device within 7 treatment day(s). (4.)Mr. Nolasco will tolerate 25+ minutes of therapeutic exercise in order to demonstrate improved activity tolerance within 7 treatment days. PHYSICAL THERAPY: Daily Note PM   (Link to Caseload Tracking: PT Visit Days : 2  Time In/Out PT Charge Capture  Rehab Caseload Tracker  Orders    Garland Walker is a 76 y.o. male   PRIMARY DIAGNOSIS: SBO (small bowel obstruction) ([facility])  Small bowel obstruction (HCC) [K56.609]  SBO (small bowel obstruction) ([address] [Q38.051]       Inpatient: Payor: MEDICARE / Plan: MEDICARE PART A AND B / Product Type: *No Product type* /     ASSESSMENT:     REHAB RECOMMENDATIONS:   Recommendation to date pending progress:  Setting:  Resume OP PT (pt current at [address] PT with [facility])    Equipment:    To Be Determined (pt would benefit from use of RW if he does not already have one)     ASSESSMENT:  Mr. Shobha Roche presents resting edge of bed, agreeable to therapy. He reports he feels much less confident in mobility since the episode and fall which occurred yesterday. PT facilitated therapeutic activities including transfer training (BUE support at [facility] [place] [place]), standing balance activities, and ambulation x400 ft with RW, CGA. Cues for safety, pacing, and sequencing. Good progress noted today, stated goals continue to be appropriate. Will continue therapy efforts. SUBJECTIVE:   Mr. Shobha Roche states, \"My legs are mush. \"     Social/Functional Lives With: Alone  Type of Home: Apartment (Town home)  Home Layout: Two level  Home Access: Level entry  Bathroom Shower/Tub: Tub/Shower unit  Bathroom Toilet: Standard  Home Equipment:  (none)  ADL Assistance: Independent  Homemaking Assistance: Independent  Ambulation Assistance: Independent  Transfer Assistance: Independent  Active : Yes  Mode of Transportation: Car  Occupation: Retired  OBJECTIVE:     PAIN: VITALS / O2: PRECAUTION / Sondra Funk / Lita Purple:   Pre Treatment:   Pain Assessment: None - Denies Pain      Post Treatment: 0/10 Vitals        Oxygen    Colostomy, John Catheter, IV, and Nasogastric Tube    RESTRICTIONS/PRECAUTIONS:  Restrictions/Precautions  Restrictions/Precautions: Fall Risk, Bed Alarm  Restrictions/Precautions: Fall Risk, Bed Alarm     MOBILITY: I Mod I S SBA CGA Min Mod Max Total  NT x2 Comments:   Bed Mobility    Rolling [] [] [] [x] [] [] [] [] [] [] []    Supine to Sit [] [] [] [x] [] [] [] [] [] [] []    Scooting [] [] [] [x] [] [] [] [] [] [] []    Sit to Supine [] [] [] [x] [] [] [] [] [] [] []    Transfers    Sit to Stand [] [] [] [] [x] [] [] [] [] [] []    Bed to Chair [] [] [] [] [x] [] [] [] [] [] []    Stand to Sit [] [] [] [] [x] [] [] [] [] [] []     [] [] [] [] [] [] [] [] [] [] []    I=Independent, Mod I=Modified Independent, S=Supervision, SBA=Standby Assistance, CGA=Contact Guard Assistance,   Min=Minimal Assistance, Mod=Moderate Assistance, Max=Maximal Assistance, Total=Total Assistance, NT=Not Tested    BALANCE: Good Fair+ Fair Fair- Poor NT Comments   Sitting Static [x] [x] [] [] [] []    Sitting Dynamic [] [x] [] [] [] []              Standing Static [] [] [x] [] [] []    Standing Dynamic [] [] [x] [] [] []      GAIT: I Mod I S SBA CGA Min Mod Max Total  NT x2 Comments:   Level of Assistance [] [] [] [x] [x] [] [] [] [] [] []    Distance 400 feet    DME Rolling Walker    Gait Quality Narrow base of support and Path deviations     Weightbearing Status      Stairs I=Independent, Mod I=Modified Independent, S=Supervision, SBA=Standby Assistance, CGA=Contact Guard Assistance,   Min=Minimal Assistance, Mod=Moderate Assistance, Max=Maximal Assistance, Total=Total Assistance, NT=Not Tested    PLAN:   [address]: 3 times/week for duration of hospital stay or until stated goals are met, whichever comes first.    TREATMENT:   TREATMENT:   Therapeutic Activity (30 Minutes): Therapeutic activity included Transfer Training, Ambulation on level ground, Sitting balance , and Standing balance to improve functional Activity tolerance, Balance, Mobility, and Strength.     TREATMENT GRID:  N/A    AFTER TREATMENT PRECAUTIONS: Alarm Activated, Bed/Chair Locked, Call light within reach, Chair, Needs within reach, and RN notified    INTERDISCIPLINARY COLLABORATION:  RN/ PCT, PT/ PTA, and OT/ FARNSWORTH    EDUCATION:      TIME IN/OUT:  Time In: 1315  Time Out: 5  Minutes: Sierra Callahan, PT

## 2022-11-16 NOTE — PROGRESS NOTES
RRT Clinical Rounding Nurse Update    Vitals:    11/16/22 0729 11/16/22 1201 11/16/22 1556 11/16/22 1616   BP: (!) 159/86 (!) 143/77  (!) 178/84   Pulse: 83 73 59 (!) 111   Resp: 16 18 18   Temp: 98.1 °F (36.7 °C) 97.9 °F (36.6 °C)  97.2 °F (36.2 °C)   TempSrc: Oral Oral     SpO2: 97% 99%  91%   Weight:       Height:            DETERIORATION INDEX SCORE: 34    ASSESSMENT:  Previous outreach assessment was reviewed. There have been no significant changes since previous assessment. NGT clamped. Patient able to have small PO intake. PLAN:  Will discharge from RRT Clinical Rounding Program per protocol. Please call if needed.     Rina Prader, RN  [facility]: [facility]: [phone]

## 2022-11-16 NOTE — PROGRESS NOTES
Hourly rounds complete this shift, no new complaints at this time, Patient has increased Troponin evening Cardio NP notified as well as on call MD patient also has blood in suction canister MD notified as well  : bed in low, locked position, call light and bedside table within reach,  all needs met. Will continue to monitor Report to day shift nurse.

## 2022-11-16 NOTE — CARE COORDINATION
Chart reviewed by  for discharge planning. Patient evaluated by PT with a recommendation to continue with outpatient therapy following hospital discharge. CM will confirm patient is in agreement with plan. No additional discharge needs noted at this time. CM continues to monitor.

## 2022-11-16 NOTE — PROGRESS NOTES
Pt has a rising troponin. Cards NP was contacted and cards will see patient in the morning. No additional changes to medical plan at this time.   If patient has new onset chest pain, stat EKG, re-draw new troponin and contact covering hospitalist.    Diana Odom MD

## 2022-11-16 NOTE — PROGRESS NOTES
Hospitalist Progress Note   Admit Date:  2022  5:41 AM   Name:  Cornell Nolasco   Age:  76 y.o. Sex:  male  :  1945   MRN:  [id number]   Room:  [facility]/    Presenting Complaint: No chief complaint on file. Reason(s) for Admission: Small bowel obstruction ([facility] [address] [K56.609]  SBO (small bowel obstruction) [facility]) [address] [place] [address] Course:   Enrrique Aguirre is a 76 y.o. 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 He was recently hospitalized 10/16-10/27 for LE weakness, received IVIG and followed by neuro, he was dc'd to [facility] and states IN from there 11/10 in good health. CT A/P consistent with SBO. 2 sets blood cultures drawn in ED, noted with large leukocytes in urine, started on Rocephin and urine culture ordered. Of note, he is noted with frequent UTIs, has urostomy. Subjective & 24hr Events (22): Patient reports large volume output from colostomy yesterday. Denies nausea/vomiting or abd pain today and is wanting NGT removed and wants to eat. Assessment & Plan:     # Syncope  ? Etiology   - Troponin ordered and trended up overnight but now trending down again. Denies chest pain. Cardiology has seen, suspects r/t demand ischemia in setting of SBO. Echo EF 60% w/o wma  Cont to monitor on telemetry     SBO   - had colostomy output yesterday. Will clamp NGT and trial liquids. If tolerates can dc NGT tonight. Prob uti/chronic indwelling catheter/h/o ureteral stent to be replaced in dec 2022  ID dced antibiotics     H/o ostomy- h/o s/p sigmoidectomy     H/o depression, rectal cancer ,hypothyroidism     Heparin  Full code      Anticipated discharge needs:      Likely DC home 1-2 days if tolerating diet. Diet:  ADULT DIET;  Clear Liquid  DVT PPx: hep sq  Code status: Full Code    Hospital Problems:  Principal Problem:    SBO (small bowel obstruction) (HCC)  Active Problems:    Mild episode of recurrent major depressive disorder (HCC)    Colostomy complication, unspecified (HCC)    Syncope    Rectal cancer (HCC)    Hypothyroidism    Severe protein-calorie malnutrition (HCC)    Urinary tract infection associated with indwelling urethral catheter ([facility] [address]  Resolved Problems:    * No resolved hospital problems. *      Objective:   Patient Vitals for the past 24 hrs:   Temp Pulse Resp BP SpO2   11/16/22 1616 97.2 °F (36.2 °C) (!) 111 18 (!) 178/84 91 %   11/16/22 1556 -- 59 -- -- --   11/16/22 1201 97.9 °F (36.6 °C) 73 18 (!) 143/77 99 %   11/16/22 0729 98.1 °F (36.7 °C) 83 16 (!) 159/86 97 %   11/16/22 0716 -- 84 -- -- --   11/16/22 0418 97.2 °F (36.2 °C) 75 18 138/84 94 %   11/15/22 2347 98.2 °F (36.8 °C) 83 20 122/74 93 %   11/15/22 1948 98.2 °F (36.8 °C) 88 20 133/86 96 %       Oxygen Therapy  SpO2: 91 %  Pulse Oximeter Device Mode: Intermittent  O2 Device: None (Room air)  O2 Flow Rate (L/min): 2 L/min    Estimated body mass index is 18.49 kg/m² as calculated from the following:    Height as of this encounter: 5' 8.5\" (1.74 m). Weight as of this encounter: 123 lb 6.4 oz (56 kg). Intake/Output Summary (Last 24 hours) at 11/16/2022 1842  Last data filed at 11/16/2022 1815  Gross per 24 hour   Intake --   Output 2925 ml   Net -2925 ml         Physical Exam:     Blood pressure (!) 178/84, pulse (!) 111, temperature 97.2 °F (36.2 °C), resp. rate 18, height 5' 8.5\" (1.74 m), weight 123 lb 6.4 oz (56 kg), SpO2 91 %. General:    Well nourished. Head:  Normocephalic, atraumatic  Eyes:  Sclerae appear normal.  Pupils equally round. ENT:  Nares appear normal, no drainage. Moist oral mucosa  Neck:  No restricted ROM. Trachea midline   CV:   RRR. No m/r/g. No jugular venous distension. Lungs:   CTAB. No wheezing, rhonchi, or rales. Symmetric expansion. Abdomen: Bowel sounds present. Soft, nontender, nondistended. Colostomy with scant stool/air in bag  Extremities: No cyanosis or clubbing. No edema  Skin:     No rashes and normal coloration. Warm and dry. Neuro:  CN II-XII grossly intact. Sensation intact. A&Ox3  Psych:  Normal mood and affect.       I have personally reviewed labs and tests showing:  Recent Labs:  Recent Results (from the past 48 hour(s))   Basic Metabolic Panel w/ Reflex to MG    Collection Time: 11/15/22  5:27 AM   Result Value Ref Range    Sodium 136 133 - 143 mmol/L    Potassium 4.0 3.5 - 5.1 mmol/L    Chloride 102 101 - 110 mmol/L    CO2 29 21 - 32 mmol/L    Anion Gap 5 2 - 11 mmol/L    Glucose 99 65 - 100 mg/dL    BUN 18 8 - 23 MG/DL    Creatinine 1.30 0.8 - 1.5 MG/DL    Est, Glom Filt Rate 56 (L) >60 ml/min/1.73m2    Calcium 9.1 8.3 - 10.4 MG/DL   CBC with Auto Differential    Collection Time: 11/15/22  5:27 AM   Result Value Ref Range    WBC 6.9 4.3 - 11.1 K/uL    RBC 3.67 (L) 4.23 - 5.6 M/uL    Hemoglobin 10.6 (L) 13.6 - 17.2 g/dL    Hematocrit 34.2 (L) 41.1 - 50.3 %    MCV 93.2 82 - 102 FL    MCH 28.9 26.1 - 32.9 PG    MCHC 31.0 (L) 31.4 - 35.0 g/dL    RDW 15.8 (H) 11.9 - 14.6 %    Platelets 118 207 - 999 K/uL    MPV 8.9 (L) 9.4 - 12.3 FL    nRBC 0.00 0.0 - 0.2 K/uL    Differential Type AUTOMATED      Seg Neutrophils 83 (H) 43 - 78 %    Lymphocytes 7 (L) 13 - 44 %    Monocytes 8 4.0 - 12.0 %    Eosinophils % 1 0.5 - 7.8 %    Basophils 1 0.0 - 2.0 %    Immature Granulocytes 0 0.0 - 5.0 %    Segs Absolute 5.7 1.7 - 8.2 K/UL    Absolute Lymph # 0.5 0.5 - 4.6 K/UL    Absolute Mono # 0.6 0.1 - 1.3 K/UL    Absolute Eos # 0.1 0.0 - 0.8 K/UL    Basophils Absolute 0.0 0.0 - 0.2 K/UL    Absolute Immature Granulocyte 0.0 0.0 - 0.5 K/UL   Phosphorus    Collection Time: 11/15/22  5:27 AM   Result Value Ref Range    Phosphorus 3.2 2.3 - 3.7 MG/DL   EKG 12 Lead    Collection Time: 11/15/22 10:14 AM   Result Value Ref Range    Ventricular Rate 90 BPM    Atrial Rate 90 BPM    P-R Interval 180 ms    QRS Duration 114 ms    Q-T Interval 346 ms    QTc Calculation (Bazett) 423 ms    P Axis 50 degrees    R Axis 62 degrees    T Axis 34 degrees    Diagnosis Normal sinus rhythm    Troponin    Collection Time: 11/15/22 10:49 AM   Result Value Ref Range    Troponin, High Sensitivity 26.7 (H) 0 - 14 pg/mL   Troponin    Collection Time: 11/15/22  2:12 PM   Result Value Ref Range    Troponin, High Sensitivity 259.8 (HH) 0 - 14 pg/mL   Troponin    Collection Time: 11/15/22  4:52 PM   Result Value Ref Range    Troponin, High Sensitivity 394.5 (HH) 0 - 14 pg/mL   EKG 12 Lead    Collection Time: 11/15/22  6:51 PM   Result Value Ref Range    Ventricular Rate 87 BPM    Atrial Rate 87 BPM    P-R Interval 174 ms    QRS Duration 114 ms    Q-T Interval 394 ms    QTc Calculation (Bazett) 474 ms    P Axis 49 degrees    R Axis 84 degrees    T Axis 2 degrees    Diagnosis Normal sinus rhythm    Troponin    Collection Time: 11/15/22  7:51 PM   Result Value Ref Range    Troponin, High Sensitivity 446.0 (HH) 0 - 14 pg/mL   Troponin    Collection Time: 11/16/22 12:13 AM   Result Value Ref Range    Troponin, High Sensitivity 401.5 (HH) 0 - 14 pg/mL   Basic Metabolic Panel w/ Reflex to MG    Collection Time: 11/16/22  5:07 AM   Result Value Ref Range    Sodium 138 133 - 143 mmol/L    Potassium 3.8 3.5 - 5.1 mmol/L    Chloride 104 101 - 110 mmol/L    CO2 29 21 - 32 mmol/L    Anion Gap 5 2 - 11 mmol/L    Glucose 83 65 - 100 mg/dL    BUN 14 8 - 23 MG/DL    Creatinine 1.30 0.8 - 1.5 MG/DL    Est, Glom Filt Rate 56 (L) >60 ml/min/1.73m2    Calcium 8.4 8.3 - 10.4 MG/DL   CBC with Auto Differential    Collection Time: 11/16/22  5:07 AM   Result Value Ref Range    WBC 4.6 4.3 - 11.1 K/uL    RBC 3.57 (L) 4.23 - 5.6 M/uL    Hemoglobin 10.5 (L) 13.6 - 17.2 g/dL    Hematocrit 33.3 (L) 41.1 - 50.3 %    MCV 93.3 82 - 102 FL    MCH 29.4 26.1 - 32.9 PG    MCHC 31.5 31.4 - 35.0 g/dL    RDW 15.6 (H) 11.9 - 14.6 %    Platelets 831 (L) 498 - 450 K/uL    MPV 10.3 9.4 - 12.3 FL    nRBC 0.00 0.0 - 0.2 K/uL    Seg Neutrophils 75 43 - 78 %    Lymphocytes 10 (L) 13 - 44 %    Monocytes 11 4.0 - 12.0 %    Eosinophils % 2 0.5 - 7.8 %    Basophils 1 0.0 - 2.0 %    Immature Granulocytes 1 0.0 - 5.0 %    Segs Absolute 3.5 1.7 - 8.2 K/UL    Absolute Lymph # 0.5 0.5 - 4.6 K/UL    Absolute Mono # 0.5 0.1 - 1.3 K/UL    Absolute Eos # 0.1 0.0 - 0.8 K/UL    Basophils Absolute 0.0 0.0 - 0.2 K/UL    Absolute Immature Granulocyte 0.0 0.0 - 0.5 K/UL    RBC Comment SLIGHT  ANISOCYTOSIS + POIKILOCYTOSIS        WBC Comment Result Confirmed By Smear      Platelet Comment ADEQUATE      Differential Type AUTOMATED     Phosphorus    Collection Time: 11/16/22  5:07 AM   Result Value Ref Range    Phosphorus 2.9 2.3 - 3.7 MG/DL   Troponin    Collection Time: 11/16/22  5:07 AM   Result Value Ref Range    Troponin, High Sensitivity 292.7 (HH) 0 - 14 pg/mL   Troponin    Collection Time: 11/16/22  8:12 AM   Result Value Ref Range    Troponin, High Sensitivity 275.4 (HH) 0 - 14 pg/mL   Transthoracic echocardiogram (TTE) complete with contrast, bubble, strain, and 3D PRN    Collection Time: 11/16/22  2:58 PM   Result Value Ref Range    AV Mean Velocity 0.8 m/s    AV Mean Gradient 3 mmHg    AV VTI 22.5 cm    AV Peak Velocity 1.3 m/s    AV Peak Gradient 7 mmHg    AV Area by VTI 2.7 cm2    AV Area by Peak Velocity 2.1 cm2    Aortic Root 3.7 cm    IVC Proxmal 1.9 cm    IVSd 0.8 0.6 - 1.0 cm    LVIDd 4.6 4.2 - 5.9 cm    LVIDs 3.3 cm    LVOT Diameter 2.2 cm    LVOT Mean Gradient 1 mmHg    LVOT VTI 15.8 cm    LVOT Peak Velocity 0.7 m/s    LVOT Peak Gradient 2 mmHg    LVPWd 0.9 0.6 - 1.0 cm    LV E' Lateral Velocity 18 cm/s    LV E' Septal Velocity 7 cm/s    LVOT Area 3.8 cm2    LVOT SV 60.0 ml    LA Diameter 3.7 cm    MV E Wave Deceleration Time 238.0 ms    MV A Velocity 0.81 m/s    MV E Velocity 0.52 m/s    PV AT 82.0 ms    RV Free Wall Peak S' 12 cm/s    TAPSE 1.9 1.7 cm    TR Max Velocity 2.44 m/s    TR Peak Gradient 24 mmHg    Body Surface Area 1.64 m2    Fractional Shortening 2D 28 28 - 44 %    LVIDd Index 2.75 cm/m2    LVIDs Index 1.98 cm/m2    LV RWT Ratio 0.39     LV Mass 2D 127.7 88 - 224 g    LV Mass 2D Index 76.4 49 - 115 g/m2    MV E/A 0.64     E/E' Ratio (Averaged) 5.16     E/E' Lateral 2.89     E/E' Septal 7.43     LVOT Stroke Volume Index 35.9 mL/m2    LA Size Index 2.22 cm/m2    LA/AO Root Ratio 1.00     Ao Root Index 2.22 cm/m2    AV Velocity Ratio 0.54     LVOT:AV VTI Index 0.70     QUE/BSA VTI 1.6 cm2/m2    QUE/BSA Peak Velocity 1.3 cm2/m2    Est. RA Pressure 3 mmHg    RVSP 27 mmHg       I have personally reviewed imaging studies showing: Other Studies:  XR ABDOMEN (KUB) (SINGLE AP VIEW)   Final Result   1. Persistent dilated small bowel loops within the right hemiabdomen compatible   with persistent bowel obstruction. XR CHEST 1 VIEW   Final Result   1. Elevation of left hemidiaphragm with associated basilar subsegmental   atelectasis and/or infiltrates. XR ABDOMEN (KUB) (SINGLE AP VIEW)   Final Result   The bowel gas pattern is normal. There is no evidence of   obstruction. There is no free air visualized on this supine view. The lung bases   are clear. Residual oral contrast noted in the stomach. Nasogastric tube   terminates in the mid stomach below the left hemidiaphragm. Right ureteral stent   in place. CT ABDOMEN PELVIS W IV CONTRAST Additional Contrast? None   Final Result      1. Findings compatible with small bowel obstruction. Multiple dilated loops of   small bowel seen with a \"small bowel feces sign\" within the central abdomen. There are some decompressed loops of bowel within the left midabdomen, though a   distinct transition point is not identified. 2. Findings are otherwise unchanged compared with the reference exam.               XR ABDOMEN (KUB) (SINGLE AP VIEW)   Final Result   There are dilated loops of bowel noted in the right abdomen.   It is difficult to differentiate between large and small bowel. Bowel obstruction is not excluded. Stool is seen in the left upper quadrant. Current Meds:  Current Facility-Administered Medications   Medication Dose Route Frequency    amitriptyline (ELAVIL) tablet 100 mg  100 mg Oral Nightly    carvedilol (COREG) tablet 6.25 mg  6.25 mg Oral BID WC    clonazePAM (KLONOPIN) tablet 4 mg  4 mg Oral Nightly PRN    [START ON 11/17/2022] levothyroxine (SYNTHROID) tablet 125 mcg  125 mcg Oral Daily    OLANZapine (ZYPREXA) tablet 7.5 mg  7.5 mg Oral Nightly    metoprolol (LOPRESSOR) injection 2.5 mg  2.5 mg IntraVENous Q6H PRN    heparin (porcine) injection 5,000 Units  5,000 Units SubCUTAneous 3 times per day    0.9 % sodium chloride bolus  100 mL IntraVENous Once    sodium chloride flush 0.9 % injection 5-40 mL  5-40 mL IntraVENous 2 times per day    sodium chloride flush 0.9 % injection 5-40 mL  5-40 mL IntraVENous PRN    0.9 % sodium chloride infusion   IntraVENous PRN    ondansetron (ZOFRAN-ODT) disintegrating tablet 4 mg  4 mg Oral Q8H PRN    Or    ondansetron (ZOFRAN) injection 4 mg  4 mg IntraVENous Q6H PRN    polyethylene glycol (GLYCOLAX) packet 17 g  17 g Oral Daily PRN    acetaminophen (TYLENOL) tablet 650 mg  650 mg Oral Q6H PRN    Or    acetaminophen (TYLENOL) suppository 650 mg  650 mg Rectal Q6H PRN    pantoprazole (PROTONIX) 40 mg in sodium chloride (PF) 0.9 % 10 mL injection  40 mg IntraVENous Q12H    lactated ringers infusion   IntraVENous Continuous    polyethylene glycol (GLYCOLAX) packet 17 g  17 g Oral BID    clonazePAM (KLONOPIN) tablet 4 mg  4 mg Per NG tube Nightly    traMADol (ULTRAM) tablet 50 mg  50 mg Per NG tube Q6H PRN       Signed:  Carissa Jay DO    Part of this note may have been written by using a voice dictation software. The note has been proof read but may still contain some grammatical/other typographical errors.

## 2022-11-16 NOTE — H&P
[facility] Cardiology Initial Cardiac Evaluation      Date of  Admission: 11/14/2022  5:41 AM     Primary Care Physician: Christiano Martinez DO  Primary Cardiologist: None  Referring Physician: Dr Miladys Ortiz   Supervising Physician: Dr Celina Severs     CC/Reason for evaluation: syncope    HPI:  Rebecca Queen is a 76 y.o. male with prior history of rectal cancer status post sigmoidectomy with colostomy, status post urostomy bag secondary to damage to the bladder from radiation and surgery, history of sepsis secondary to polymicrobial bacteremia in the setting of CAUTI, hypothyroidism and anxiety/depression who presented to [facility] ED on 11/13 with decreased output to colostomy. CT A/P consistent with SBO. Admitted to medicine team for further care. Seen by general surgery who felt was not surgically approachable. Recommended gentle but persistent attempts opening small bowel with laxatives. On 11/15 rapid response called for syncopal episode. Patient reports he has lower extremity weakness and just wanted to sit down. Patient denies passing out. Denies any chest pain or palpitations. Patient was placed on telemetry and troponin was trended ([phone]). Cardiology consulted due to troponin trending up in setting of syncope. ECHO ordered.       Past Medical History:   Diagnosis Date    Acute deep vein thrombosis (DVT) of non-extremity vein 5/20/2019    Family history of malignant neoplasm of gastrointestinal tract     mother colon/ovarian cancer 67    Fecal incontinence     LAR syndrome    John catheter in place 2022    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    surgery and radiation    Psychiatric disorder     anxiety      Past Surgical History:   Procedure Laterality Date    COLONOSCOPY  last 2/3/15    Roberto--no polyps--3 year recall    COLONOSCOPY N/A 3/5/2021    COLONOSCOPY/BMI 19 performed by Denise Manriquez MD at [facility] ENDOSCOPY    COLONOSCOPY N/A 2/12/2018    COLONOSCOPY / BMI=21 performed by Roxy Lopez MD at [place] [address]  2/12/2018         COLONOSCOPY THRU STOMA,LESN RMVL W/SNARE  3/5/2021         COLOSTOMY  5/11/16    CT RETROPERITONEAL PERC DRAIN  5/13/2021    CT RETROPERITONEAL PERC DRAIN 5/13/2021 [facility] CT SCAN    CT RETROPERITONEAL PERC DRAIN  5/20/2021    CT RETROPERITONEAL PERC DRAIN 5/20/2021 [facility]Y CT SCAN    GI  4/2016    Sacral nerve stimlator lead trial (unsucessful) and removal    HERNIA REPAIR      and Colostomy in May    HERNIA REPAIR  3/2015, 5/2016    IR ABSCESS EVAL THRU EXISTING CATH  11/24/2021    IR ABSCESS EVAL THRU EXISTING CATH  6/4/2021    IR ABSCESS EVAL THRU EXISTING CATH  5/20/2021    IR NEPHROSTOGRAM EXISTING ACCESS  12/10/2021    IR NEPHROSTOGRAM EXISTING ACCESS  10/4/2021    IR NEPHROSTOMY EXCHANGE CATHETER  12/7/2021    IR NEPHROSTOMY EXCHANGE CATHETER  11/3/2021    IR NEPHROSTOMY EXCHANGE CATHETER  10/28/2021    IR NEPHROSTOMY PERCUTANEOUS LEFT  9/16/2021    IR NEPHROSTOMY PERCUTANEOUS LEFT  9/16/2021    IR NEPHROSTOMY PERCUTANEOUS LEFT 9/16/2021 [facility] SPECIALS    IR REPLCE T CVC WO PORT SAME ACC  5/30/2019    IR TUBE CHANGE  8/5/2021    IR TUBE CHANGE  7/8/2021    IR TUBE CHANGE  6/14/2021    IR TUBE CHANGE  5/27/2021    IR TUNNELED CATHETER PLACEMENT GREATER THAN 5 YEARS  5/20/2019    IR TUNNELED CATHETER PLACEMENT GREATER THAN 5 YEARS  5/20/2019    IR TUNNELED CATHETER PLACEMENT GREATER THAN 5 YEARS 5/20/2019 [facility] SPECIALS    OTHER SURGICAL HISTORY Bilateral     cataracts      OTHER SURGICAL HISTORY  10/7/2013    Roberto---endorectal us    POLYPECTOMY      TOTAL COLECTOMY  2013    Roberto--lap LAR with coloproctostomy, mobilization splenic flexure, diverting loop ileostomy    TOTAL COLECTOMY Right 2014    for leak    VASCULAR SURGERY Left     single lumen port/subsequently removed       No Known Allergies   Social History     Socioeconomic History    Marital status: Single     Spouse name: Not on file    Number of children: Not on file    Years of education: Not on file    Highest education level: Not on file   Occupational History    Not on file   Tobacco Use    Smoking status: Former     Packs/day: 1.00     Types: Cigarettes     Quit date: 1963     Years since quittin.0    Smokeless tobacco: Never   Substance and Sexual Activity    Alcohol use: Not Currently     Alcohol/week: 11.7 standard drinks    Drug use: No    Sexual activity: Not on file   Other Topics Concern    Not on file   Social History Narrative    Not on file     Social Determinants of Health     Financial Resource Strain: Not on file   Food Insecurity: Not on file   Transportation Needs: Not on file   Physical Activity: Not on file   Stress: Not on file   Social Connections: Not on file   Intimate Partner Violence: Not on file   Housing Stability: Not on file     Social History       Tobacco History       Smoking Status  Former Quit Date  1963 Smoking Frequency  1.00 packs/day Smoking Tobacco Type  Cigarettes quit in 1963      Smokeless Tobacco Use  Never              Alcohol History       Alcohol Use Status  Not Currently Amount  11.7 standard drinks of alcohol/wk              Drug Use       Drug Use Status  No              Sexual Activity       Sexually Active  Not Asked                    Family History   Problem Relation Age of Onset    Cancer Mother         colon and ovarian    Cancer Brother prostate    Alcohol Abuse Brother     Cancer Maternal Grandmother         bone    Alcohol Abuse Father     Stroke Paternal Grandfather     Alcohol Abuse Sister         Current Facility-Administered Medications   Medication Dose Route Frequency    metoprolol (LOPRESSOR) injection 2.5 mg  2.5 mg IntraVENous Q6H PRN    heparin (porcine) injection 5,000 Units  5,000 Units SubCUTAneous 3 times per day    0.9 % sodium chloride bolus  100 mL IntraVENous Once    sodium chloride flush 0.9 % injection 5-40 mL  5-40 mL IntraVENous 2 times per day    sodium chloride flush 0.9 % injection 5-40 mL  5-40 mL IntraVENous PRN    0.9 % sodium chloride infusion   IntraVENous PRN    ondansetron (ZOFRAN-ODT) disintegrating tablet 4 mg  4 mg Oral Q8H PRN    Or    ondansetron (ZOFRAN) injection 4 mg  4 mg IntraVENous Q6H PRN    polyethylene glycol (GLYCOLAX) packet 17 g  17 g Oral Daily PRN    acetaminophen (TYLENOL) tablet 650 mg  650 mg Oral Q6H PRN    Or    acetaminophen (TYLENOL) suppository 650 mg  650 mg Rectal Q6H PRN    pantoprazole (PROTONIX) 40 mg in sodium chloride (PF) 0.9 % 10 mL injection  40 mg IntraVENous Q12H    lactated ringers infusion   IntraVENous Continuous    polyethylene glycol (GLYCOLAX) packet 17 g  17 g Oral BID    amitriptyline (ELAVIL) tablet 100 mg  100 mg Per NG tube Nightly    clonazePAM (KLONOPIN) tablet 4 mg  4 mg Per NG tube Nightly    OLANZapine (ZYPREXA) tablet 7.5 mg  7.5 mg Per NG tube Nightly    traMADol (ULTRAM) tablet 50 mg  50 mg Per NG tube Q6H PRN       Review of Symptoms:    General: Positive for generalized weakness.  No weight changes, fever or chills  Skin: no rashes, lumps, or other skin changes  HEENT: no headache, dizziness, lightheadedness, vision changes, hearing changes, tinnitus, vertigo, sinus pressure/pain, bleeding gums, sore throat, or hoarseness  Neck: no swollen glands, goiter, pain or stiffness  Respiratory: No cough, sputum, hemoptysis,  dyspnea, wheezing  Cardiovascular: + as per HPI  Gastrointestinal: no GERD, diarrhea, liver problems  Urinary: no frequency, urgency , hematuria, burning/pain with urination, recent flank pain, polyuria, nocturia, or difficulty urinating  Peripheral Vascular: no claudication, leg cramps, prior DVTs, swelling of calves, legs, or feet, color change, or swelling with redness or tenderness  Musculoskeletal: no muscle or joint pain/stiffness, joint swelling, erythema of joints, or back pain  Psychiatric: no depression or excessive stress  Neurological: no sensory or motor loss, seizures, syncope, tremors, numbness, no dementia  Hematologic: no anemia, easy bruising or bleeding  Endocrine: No thyroid problems, heat or cold intolerance, excessive sweating, polyuria, polydipsia, diabetes.        Subjective:     Physical Exam:    Vitals:    11/15/22 2347 11/16/22 0418 11/16/22 0716 11/16/22 0729   BP: 122/74 138/84  (!) 159/86   Pulse: 83 75 84 83   Resp: 20 18  16   Temp: 98.2 °F (36.8 °C) 97.2 °F (36.2 °C)  98.1 °F (36.7 °C)   TempSrc: Axillary Oral  Oral   SpO2: 93% 94%  97%   Weight:       Height:           General: Thin, No Acute Distress  HEENT: pupils equal and round, no abnormalities noted  Neck: supple, no JVD, no carotid bruits  Heart: S1S2 with RRR without murmurs or gallops  Lungs: Clear throughout auscultation bilaterally without adventitious sounds  Abd: soft, nontender, nondistended, ostomy  Ext: warm, no edema, calves supple/nontender, pulses 2+ bilaterally  Skin: warm and dry  Psychiatric: Normal mood and affect  Neurologic: Alert and oriented X 3    Cardiographics    Telemetry: normal sinus rhythm  ECG: normal sinus rhythm  Echocardiogram: ordered       Labs:     Recent Labs     11/16/22  0507 11/15/22  0527 11/14/22  0428   WBC 4.6 6.9 7.5   HGB 10.5* 10.6* 11.7*   HCT 33.3* 34.2* 37.3*   MCV 93.3 93.2 93.0   * 192 243     Lab Results   Component Value Date    WBC 4.6 11/16/2022    HGB 10.5 (L) 11/16/2022    HCT 33.3 (L) 11/16/2022    PLT 146 (L) 11/16/2022    CHOL 205 (H) 12/03/2021    TRIG 103 12/03/2021    HDL 77 12/03/2021    ALT 33 11/14/2022    AST 28 11/14/2022     11/16/2022    K 3.8 11/16/2022     11/16/2022    CREATININE 1.30 11/16/2022    BUN 14 11/16/2022    CO2 29 11/16/2022    TSH 4.440 12/03/2021    INR 1.0 09/15/2021    LABA1C 5.2 08/03/2021    LABMICR Comment 08/06/2020      Pt has been seen and examined by Dr. Cong Rey. He agrees with the following assessment and plan. Assessment/Plan:       Principal Problem:    SBO (small bowel obstruction) (HCC)  - general surgery following     Active Problems:    Syncope   - elevated troponin trending down likely demand ischemia; patient denies chest pain or anginal symptoms   - check ECHO   - continue to monitor on telemetry       Mild episode of recurrent major depressive disorder ([address]  - per primary       Colostomy complication, unspecified ([address]  - per primary       Rectal cancer ([address]  - per primary       Hypothyroidism  - per primary       Severe protein-calorie malnutrition ([address]  - per primary       Urinary tract infection associated with indwelling urethral catheter ([address]  - per primary       Thank you for requesting cardiac evaluation and allowing us to participate in the care of this patient. We will continue to follow along with you.       Isatu Casanova PA-C  Supervising Physician: Dr Cong Rey

## 2022-11-16 NOTE — PROGRESS NOTES
ACUTE OCCUPATIONAL THERAPY GOALS:   (Developed with and agreed upon by patient and/or caregiver.)  1. Patient will complete lower body bathing and dressing with MOD I and adaptive equipment as needed. 2.Patient will complete upper body bathing and dressing with MOD I and adaptive equipment as needed. 3. Patient will complete toileting with MOD I.   4. Patient will tolerate 30 minutes of OT treatment with 1-2 rest breaks to increase activity tolerance for ADLs. 5. Patient will complete functional transfers with MOD I and adaptive equipment as needed. 6. Patient will complete simple grooming task standing at the sink with MOD I. Timeframe: 7 visits      OCCUPATIONAL THERAPY Initial Assessment and Daily Note       OT Visit Days: 1  Acknowledge Orders  Time  OT Charge Capture  Rehab Caseload Tracker      Kaiser Munguia is a 76 y.o. male   PRIMARY DIAGNOSIS: SBO (small bowel obstruction) ([facility])  Small bowel obstruction ([facility] [K56.609]  SBO (small bowel obstruction) ([facility] [Z65.375]       Reason for Referral: Generalized Muscle Weakness (M62.81)  Other lack of cordination (R27.8)  Difficulty in walking, Not elsewhere classified (R26.2)  Inpatient: Payor: MEDICARE / Plan: MEDICARE PART A AND B / Product Type: *No Product type* /     ASSESSMENT:     REHAB RECOMMENDATIONS:   Recommendation to date pending progress:  Setting:  Defer to OPPT servcies    Equipment:    Rolling Walker     ASSESSMENT:  Mr. Melita Moseley presented to the hospital with decreased output in his colostomy. Pt admitted with SBO and now has an NGT to suction. Rapid response called on 11/15 due to pt having syncopal episode with RN staff. At baseline, pt lives alone and is independent for all ADLs/IADLs. Pt was receiving OPPT services. Today, pt was received sitting EOB. Performed colostomy care and grooming task sitting EOB with supervision. CGA for LB dressing. Sit>stand and ambulation with RW CGA.  Recommend pt return home with continued OPPT services at discharge. Leonidas Jones currently demonstrates overall deficits in strength, balance, activity tolerance, and ADL performance. Patient would benefit from skilled OT services at this time in order to address functional deficits and OT goals stated above. [address] [facility] [address] [id number] AM-PAC 6 Clicks Daily Activity Inpatient Short Form:    AM-PAC Daily Activity Inpatient   How much help for putting on and taking off regular lower body clothing?: A Little  How much help for Bathing?: A Little  How much help for Toileting?: A Little  How much help for putting on and taking off regular upper body clothing?: A Little  How much help for taking care of personal grooming?: A Little  How much help for eating meals?: None  AM-PAC Inpatient Daily Activity Raw Score: 19  AM-PAC Inpatient ADL T-Scale Score : 40.22  ADL Inpatient CMS 0-100% Score: 42.8  ADL Inpatient CMS G-Code Modifier : CK     SUBJECTIVE:     Mr. Precious Horowitz states, \"I lost confidence in myself because of yesterday. \"     Social/Functional Lives With: Alone  Type of Home: Apartment (Town home)  Home Layout: Two level  Home Access: Level entry  Bathroom Shower/Tub: Tub/Shower unit  Bathroom Toilet: Standard  Home Equipment:  (none)  ADL Assistance: Independent  Homemaking Assistance: Independent  Ambulation Assistance: Independent  Transfer Assistance: Independent  Active : Yes  Mode of Transportation: Car  Occupation: Retired    OBJECTIVE:     Latrell Romero / Michael Jackson / Rayray Marinelliuth: BEAN    RESTRICTIONS/PRECAUTIONS:  Restrictions/Precautions: Fall Risk, Bed Alarm    PAIN: VITALS / O2:   Pre Treatment:   Pain Assessment: None - Denies Pain      Post Treatment: no change        Vitals          Oxygen            GROSS EVALUATION: INTACT IMPAIRED   (See Comments)   UE AROM [x] []   UE PROM [x] []   Strength []  Generalized weakness, functional for bADLs     Posture / Balance []  Fair+ sitting and standing    Sensation [x]     Coordination []       Tone [x]       Edema [x]    Activity Tolerance []  Seated rest breaks required      Hand Dominance R [] L []      COGNITION/  PERCEPTION: INTACT IMPAIRED   (See Comments)   Orientation [x]     Vision [x]     Hearing [x]     Cognition  []  Decreased safety awareness    Perception []       MOBILITY: I Mod I S SBA CGA Min Mod Max Total  NT x2 Comments:   Bed Mobility    Rolling [] [] [] [] [] [] [] [] [] [x] []    Supine to Sit [] [] [] [] [] [] [] [] [] [x] [] Received sitting EOB   Scooting [] [] [] [] [x] [] [] [] [] [] []    Sit to Supine [] [] [] [] [] [] [] [] [] [x] [] Left sitting in the chair    Transfers    Sit to Stand [] [] [] [] [x] [] [] [] [] [] []    Bed to Chair [] [] [] [] [x] [] [] [] [] [] [] RW   Stand to Sit [] [] [] [] [x] [] [] [] [] [] []    Tub/Shower [] [] [] [] [] [] [] [] [] [x] []     Toilet [] [] [] [] [] [] [] [] [] [x] []      [] [] [] [] [] [] [] [] [] [] []    I=Independent, Mod I=Modified Independent, S=Supervision/Setup, SBA=Standby Assistance, CGA=Contact Guard Assistance, Min=Minimal Assistance, Mod=Moderate Assistance, Max=Maximal Assistance, Total=Total Assistance, NT=Not Tested    ACTIVITIES OF DAILY LIVING: I Mod I S SBA CGA Min Mod Max Total NT Comments   BASIC ADLs:              Upper Body Bathing  [] [] [] [] [] [] [] [] [] [x]    Lower Body Bathing [] [] [] [] [] [] [] [] [] [x]    Toileting [] [] [] [x] [] [] [] [] [] [] Ostomy care   Upper Body Dressing [] [] [] [] [] [] [] [] [] [x]    Lower Body Dressing [] [] [] [] [x] [] [] [] [] [] Socks    Feeding [] [] [] [] [] [] [] [] [] [x]    Grooming [] [] [] [] [x] [] [] [] [] [] Washed hands sitting EOB   Personal Device Care [] [] [] [] [] [] [] [] [] [x]    Functional Mobility [] [] [] [] [x] [] [] [] [] [] RW   I=Independent, Mod I=Modified Independent, S=Supervision/Setup, SBA=Standby Assistance, CGA=Contact Guard Assistance, Min=Minimal Assistance, Mod=Moderate Assistance, Max=Maximal Assistance, Total=Total Assistance, NT=Not Tested    PLAN:   FREQUENCY/DURATION   OT Plan of Care: 3 times/week for duration of hospital stay or until stated goals are met, whichever comes first.    PROBLEM LIST:   (Skilled intervention is medically necessary to address:)  Decreased ADL/Functional Activities  Decreased Activity Tolerance  Decreased Balance  Decreased Cognition  Decreased Coordination  Decreased Safety Awareness  Decreased Strength  Decreased Transfer Abilities   INTERVENTIONS PLANNED:  (Benefits and precautions of occupational therapy have been discussed with the patient.)  Self Care Training  Therapeutic Activity  Therapeutic Exercise/HEP  Neuromuscular Re-education  Manual Therapy  Education         TREATMENT:     EVALUATION: LOW COMPLEXITY: (Untimed Charge)    TREATMENT:   Co-Treatment PT/OT necessary due to patient's decreased overall endurance/tolerance levels, as well as need for high level skilled assistance to complete functional transfers/mobility and functional tasks  Neuromuscular Re-education (10 Minutes): Neuromuscular Re-education included Balance Training, Coordination training, Postural training, Sitting balance training, and Standing balance training to improve Balance, Coordination, Functional Mobility, and Postural Control. Self Care (10 minutes): Patient participated in toileting, lower body dressing, and grooming ADLs in unsupported sitting with minimal verbal cueing to increase safety awareness. Patient also participated in functional mobility and functional transfer training to increase independence, decrease assistance required, increase activity tolerance, and increase safety awareness. TREATMENT GRID:  N/A    AFTER TREATMENT PRECAUTIONS: Alarm Activated, Call light within reach, Chair, Needs within reach, and RN notified    INTERDISCIPLINARY COLLABORATION:  RN/ PCT, PT/ PTA, and OT/ [place]    EDUCATION:  Education Given To: Patient  Education Provided: Role of Therapy;Plan of Care; Fall Prevention Strategies  Education Outcome: Verbalized understanding;Demonstrated understanding    TOTAL TREATMENT DURATION AND TIME:  Time In: 1315  Time Out: [phone]  Minutes: [address], OT

## 2022-11-16 NOTE — PROGRESS NOTES
Patient watching T.V. Tolerating clear liquids. No nausea or vomiting noted. Hourly rounds performed this shift. Bed lowered and locked. Call light within reach. All needs met at this time. Bedside shift report will be given to oncoming nurse.

## 2022-11-16 NOTE — PROGRESS NOTES
Chart reviewed  Notes report (+) ostomy output. This patient should not have abdominal surgery unless it is life or death. He has essentially an inoperable abdomen. Will be available if needed.

## 2022-11-17 ENCOUNTER — APPOINTMENT (OUTPATIENT)
Dept: PHYSICAL THERAPY | Age: 77
End: 2022-11-17
Payer: MEDICARE

## 2022-11-17 LAB
ANION GAP SERPL CALC-SCNC: 5 MMOL/L (ref 2–11)
BASOPHILS # BLD: 0 K/UL (ref 0–0.2)
BASOPHILS NFR BLD: 0 % (ref 0–2)
BUN SERPL-MCNC: 11 MG/DL (ref 8–23)
CALCIUM SERPL-MCNC: 8.4 MG/DL (ref 8.3–10.4)
CHLORIDE SERPL-SCNC: 103 MMOL/L (ref 101–110)
CO2 SERPL-SCNC: 31 MMOL/L (ref 21–32)
CREAT SERPL-MCNC: 1.2 MG/DL (ref 0.8–1.5)
DIFFERENTIAL METHOD BLD: ABNORMAL
EOSINOPHIL # BLD: 0.1 K/UL (ref 0–0.8)
EOSINOPHIL NFR BLD: 3 % (ref 0.5–7.8)
ERYTHROCYTE [DISTWIDTH] IN BLOOD BY AUTOMATED COUNT: 15.6 % (ref 11.9–14.6)
GLUCOSE SERPL-MCNC: 75 MG/DL (ref 65–100)
HCT VFR BLD AUTO: 31.6 % (ref 41.1–50.3)
HGB BLD-MCNC: 10.1 G/DL (ref 13.6–17.2)
IMM GRANULOCYTES # BLD AUTO: 0 K/UL (ref 0–0.5)
IMM GRANULOCYTES NFR BLD AUTO: 0 % (ref 0–5)
LYMPHOCYTES # BLD: 0.5 K/UL (ref 0.5–4.6)
LYMPHOCYTES NFR BLD: 11 % (ref 13–44)
Lab: 14
Lab: NORMAL
MAGNESIUM SERPL-MCNC: 1.8 MG/DL (ref 1.8–2.4)
MCH RBC QN AUTO: 29.4 PG (ref 26.1–32.9)
MCHC RBC AUTO-ENTMCNC: 32 G/DL (ref 31.4–35)
MCV RBC AUTO: 91.9 FL (ref 82–102)
MONOCYTES # BLD: 0.5 K/UL (ref 0.1–1.3)
MONOCYTES NFR BLD: 10 % (ref 4–12)
NEUTS SEG # BLD: 3.6 K/UL (ref 1.7–8.2)
NEUTS SEG NFR BLD: 76 % (ref 43–78)
NRBC # BLD: 0 K/UL (ref 0–0.2)
PHOSPHATE SERPL-MCNC: 3 MG/DL (ref 2.3–3.7)
PLATELET # BLD AUTO: 139 K/UL (ref 150–450)
PMV BLD AUTO: 9.5 FL (ref 9.4–12.3)
POTASSIUM SERPL-SCNC: 3.5 MMOL/L (ref 3.5–5.1)
RBC # BLD AUTO: 3.44 M/UL (ref 4.23–5.6)
REFERENCE LAB: NORMAL
SODIUM SERPL-SCNC: 139 MMOL/L (ref 133–143)
WBC # BLD AUTO: 4.8 K/UL (ref 4.3–11.1)

## 2022-11-17 PROCEDURE — 2580000003 HC RX 258: Performed by: NURSE PRACTITIONER

## 2022-11-17 PROCEDURE — 85025 COMPLETE CBC W/AUTO DIFF WBC: CPT

## 2022-11-17 PROCEDURE — 80048 BASIC METABOLIC PNL TOTAL CA: CPT

## 2022-11-17 PROCEDURE — 97530 THERAPEUTIC ACTIVITIES: CPT

## 2022-11-17 PROCEDURE — 99232 SBSQ HOSP IP/OBS MODERATE 35: CPT | Performed by: INTERNAL MEDICINE

## 2022-11-17 PROCEDURE — 51702 INSERT TEMP BLADDER CATH: CPT

## 2022-11-17 PROCEDURE — 36415 COLL VENOUS BLD VENIPUNCTURE: CPT

## 2022-11-17 PROCEDURE — 6360000002 HC RX W HCPCS: Performed by: FAMILY MEDICINE

## 2022-11-17 PROCEDURE — C9113 INJ PANTOPRAZOLE SODIUM, VIA: HCPCS | Performed by: NURSE PRACTITIONER

## 2022-11-17 PROCEDURE — 6370000000 HC RX 637 (ALT 250 FOR IP): Performed by: STUDENT IN AN ORGANIZED HEALTH CARE EDUCATION/TRAINING PROGRAM

## 2022-11-17 PROCEDURE — A4216 STERILE WATER/SALINE, 10 ML: HCPCS | Performed by: NURSE PRACTITIONER

## 2022-11-17 PROCEDURE — 1100000000 HC RM PRIVATE

## 2022-11-17 PROCEDURE — 83735 ASSAY OF MAGNESIUM: CPT

## 2022-11-17 PROCEDURE — 6360000002 HC RX W HCPCS: Performed by: NURSE PRACTITIONER

## 2022-11-17 PROCEDURE — 6370000000 HC RX 637 (ALT 250 FOR IP): Performed by: INTERNAL MEDICINE

## 2022-11-17 PROCEDURE — 84100 ASSAY OF PHOSPHORUS: CPT

## 2022-11-17 RX ADMIN — CLONAZEPAM 4 MG: 1 TABLET ORAL at 22:05

## 2022-11-17 RX ADMIN — SODIUM CHLORIDE, PRESERVATIVE FREE 10 ML: 5 INJECTION INTRAVENOUS at 08:41

## 2022-11-17 RX ADMIN — HEPARIN SODIUM 5000 UNITS: 5000 INJECTION INTRAVENOUS; SUBCUTANEOUS at 06:19

## 2022-11-17 RX ADMIN — HEPARIN SODIUM 5000 UNITS: 5000 INJECTION INTRAVENOUS; SUBCUTANEOUS at 15:24

## 2022-11-17 RX ADMIN — AMITRIPTYLINE HYDROCHLORIDE 100 MG: 50 TABLET, FILM COATED ORAL at 22:05

## 2022-11-17 RX ADMIN — CARVEDILOL 6.25 MG: 3.12 TABLET, FILM COATED ORAL at 08:40

## 2022-11-17 RX ADMIN — SODIUM CHLORIDE 40 MG: 9 INJECTION, SOLUTION INTRAMUSCULAR; INTRAVENOUS; SUBCUTANEOUS at 08:41

## 2022-11-17 RX ADMIN — SODIUM CHLORIDE 40 MG: 9 INJECTION, SOLUTION INTRAMUSCULAR; INTRAVENOUS; SUBCUTANEOUS at 22:05

## 2022-11-17 RX ADMIN — SODIUM CHLORIDE, POTASSIUM CHLORIDE, SODIUM LACTATE AND CALCIUM CHLORIDE: 600; 310; 30; 20 INJECTION, SOLUTION INTRAVENOUS at 22:12

## 2022-11-17 RX ADMIN — OLANZAPINE 7.5 MG: 2.5 TABLET, FILM COATED ORAL at 22:05

## 2022-11-17 RX ADMIN — LEVOTHYROXINE SODIUM 125 MCG: 0.07 TABLET ORAL at 06:20

## 2022-11-17 RX ADMIN — CARVEDILOL 6.25 MG: 3.12 TABLET, FILM COATED ORAL at 17:01

## 2022-11-17 RX ADMIN — SODIUM CHLORIDE, PRESERVATIVE FREE 10 ML: 5 INJECTION INTRAVENOUS at 22:06

## 2022-11-17 RX ADMIN — SODIUM CHLORIDE, POTASSIUM CHLORIDE, SODIUM LACTATE AND CALCIUM CHLORIDE: 600; 310; 30; 20 INJECTION, SOLUTION INTRAVENOUS at 00:45

## 2022-11-17 RX ADMIN — HEPARIN SODIUM 5000 UNITS: 5000 INJECTION INTRAVENOUS; SUBCUTANEOUS at 22:04

## 2022-11-17 NOTE — PROGRESS NOTES
am  11/17/2022 7:52 AM    Admit Date: 11/14/2022    Admit Diagnosis: Small bowel obstruction ([facility] [address] [K56.609]  [facility] [K56.609]      Subjective:    Patient : Resting in bed this morning. Here for syncope with elevated troponin (now down trending), but he is not sure he passed out. Admits to frequent lightheadedness when standing consistent with orthostatic hypotension. No chest pain, SOB. Having LE weakness, this always happens to him with hospitalization. Working with inpatient PT and hopes for outpatient PT when d/c.         Objective:    /75   Pulse 67   Temp 97.7 °F (36.5 °C) (Oral)   Resp 18   Ht 5' 8.5\" (1.74 m)   Wt 123 lb 6.4 oz (56 kg)   SpO2 97%   BMI 18.49 kg/m²     ROS:  General ROS: negative for - chills  Hematological and Lymphatic ROS: negative for - blood clots or jaundice  Respiratory ROS: no cough, shortness of breath, or wheezing  Cardiovascular ROS: no chest pain or dyspnea on exertion  Gastrointestinal ROS: no abdominal pain, change in bowel habits, or black or bloody stools  Neurological ROS: no TIA or stroke symptoms    Physical Exam:      Physical Examination: General appearance - Appearance: alert, weak appearing, and in no distress.    Neck/lymph - supple, no significant adenopathy  Chest/CV - clear to auscultation, no wheezes, rales or rhonchi, symmetric air entry  Heart - normal rate, regular rhythm, normal S1, S2, no murmurs, rubs, clicks or gallops  Abdomen/GI - ostomy, soft, nontender, nondistended, no masses or organomegaly   Musculoskeletal - no joint tenderness, deformity or swelling  Extremities - peripheral pulses normal, no pedal edema, no clubbing or cyanosis  Skin - normal coloration and turgor, no rashes, no suspicious skin lesions noted    Current Facility-Administered Medications   Medication Dose Route Frequency    amitriptyline (ELAVIL) tablet 100 mg  100 mg Oral Nightly    carvedilol (COREG) tablet 6.25 mg  6.25 mg Oral BID WC clonazePAM (KLONOPIN) tablet 4 mg  4 mg Oral Nightly PRN    levothyroxine (SYNTHROID) tablet 125 mcg  125 mcg Oral Daily    OLANZapine (ZYPREXA) tablet 7.5 mg  7.5 mg Oral Nightly    metoprolol (LOPRESSOR) injection 2.5 mg  2.5 mg IntraVENous Q6H PRN    heparin (porcine) injection 5,000 Units  5,000 Units SubCUTAneous 3 times per day    0.9 % sodium chloride bolus  100 mL IntraVENous Once    sodium chloride flush 0.9 % injection 5-40 mL  5-40 mL IntraVENous 2 times per day    sodium chloride flush 0.9 % injection 5-40 mL  5-40 mL IntraVENous PRN    0.9 % sodium chloride infusion   IntraVENous PRN    ondansetron (ZOFRAN-ODT) disintegrating tablet 4 mg  4 mg Oral Q8H PRN    Or    ondansetron (ZOFRAN) injection 4 mg  4 mg IntraVENous Q6H PRN    polyethylene glycol (GLYCOLAX) packet 17 g  17 g Oral Daily PRN    acetaminophen (TYLENOL) tablet 650 mg  650 mg Oral Q6H PRN    Or    acetaminophen (TYLENOL) suppository 650 mg  650 mg Rectal Q6H PRN    pantoprazole (PROTONIX) 40 mg in sodium chloride (PF) 0.9 % 10 mL injection  40 mg IntraVENous Q12H    lactated ringers infusion   IntraVENous Continuous    polyethylene glycol (GLYCOLAX) packet 17 g  17 g Oral BID    clonazePAM (KLONOPIN) tablet 4 mg  4 mg Per NG tube Nightly    traMADol (ULTRAM) tablet 50 mg  50 mg Per NG tube Q6H PRN       Data Review: data included in this note has been independently reviewed by the author     TELEMETRY: nsr    Assessment/Plan:     Patient Active Problem List   Diagnosis    Fecal incontinence    Dilated intrahepatic bile duct    SBO (small bowel obstruction) (HCC)    Hypoxemia    Dehydration    Weight loss    Rectal cancer (HCC)    Bladder injury with open wound into cavity    Hypercalcemia of malignancy    Severe sepsis with acute organ dysfunction (HCC)    Attention to ileostomy (HCC)    Acute blood loss anemia    Anemia    Abscess    Hypernatremia    Decreased activities of daily living (ADL)    DVT (deep vein thrombosis) in pregnancy Enterocutaneous fistula    Hypothyroidism    Small bowel obstruction (HCC)    Ileus (HCC)    Abdominal wall abscess    Pleural effusion    H/O urinary retention    Severe protein-calorie malnutrition (HCC)    Elevated LFTs    Urinary retention    Hydronephrosis of right kidney    C. difficile colitis    Anxiety    Pancreatic duct dilated    Hypomagnesemia    Rectal fistula    Hematuria    Major depression    Colostomy care (HCC)    Chronic indwelling John catheter    Acute deep vein thrombosis (DVT) of non-extremity vein    Hypokalemia    Postoperative ileus (HCC)    Bowel incontinence    Constipation    Urinary tract infection associated with indwelling urethral catheter ([address]    Acute kidney injury superimposed on CKD ([address]    Pelvic abscess in male 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 [address]    Sepsis with acute organ dysfunction (HCC)    Mild episode of recurrent major depressive disorder (HCC)    Gram-negative bacteremia    Bacteremia due to Gram-positive bacteria    Acute urinary retention    Leg weakness, bilateral    Colostomy complication, unspecified (HCC)    Syncope    Elevated troponin     PLAN    Syncope    Echo and EKG results show no contributory findings  Troponin downward trending   Call if needed    David Figueroa  I have personally seen and evaluated the patient and reviewed the students note and agree with the following assessment and plan and findings. I was present for and observed the key components of this note. Any appropriate additions or editing of the information have been done by me.     Peyton Jackson MD, [facility]  Cardiology

## 2022-11-17 NOTE — PROGRESS NOTES
ACUTE PHYSICAL THERAPY GOALS:   (Developed with and agreed upon by patient and/or caregiver. )  LTG:  (1.)Mr. Nolasco will move from supine to sit and sit to supine , scoot up and down, and roll side to side in bed with INDEPENDENCE within 5 treatment day(s). (2.)Mr. Nolasco will transfer from bed to chair and chair to bed with INDEPENDENCE using the least restrictive device within 7 treatment day(s). (3.)Mr. Nolasco will ambulate with INDEPENDENCE for a minimum of 500 feet with the least restrictive device within 7 treatment day(s). (4.)Mr. Nolasco will tolerate 25+ minutes of therapeutic exercise in order to demonstrate improved activity tolerance within 7 treatment days. PHYSICAL THERAPY: Daily Note AM   (Link to Caseload Tracking: PT Visit Days : 3  Time In/Out PT Charge Capture  Rehab Caseload Tracker  Orders    Tanesha Tucker is a 76 y.o. male   PRIMARY DIAGNOSIS: SBO (small bowel obstruction) ([facility])  Small bowel obstruction ([facility] [address] [K56.609]  SBO (small bowel obstruction) ([facility] [address] [K56.609]       Inpatient: Payor: MEDICARE / Plan: MEDICARE PART A AND B / Product Type: *No Product type* /     ASSESSMENT:     REHAB RECOMMENDATIONS:   Recommendation to date pending progress:  Setting:  Short-term Rehab    Equipment:    To Be Determined      ASSESSMENT:  Mr. Rula Rosen presents resting edge of bed, agreeable to therapy. He sat himself up and stood with CGA and poor technique. He walked the whole floor with SBA. Doing better regularly but looking forward to rehab and getting stronger.      SUBJECTIVE:   Mr. Rula Rosen states, \"I fell a couple days ago\"     Social/Functional Lives With: Alone  Type of Home: Apartment ([address] [place] [address])  Home Layout: Two level  Home Access: Level entry  Bathroom Shower/Tub: Tub/Shower unit  Bathroom Toilet: Standard  Home Equipment:  (none)  ADL Assistance: [address] [place] [address] Avenue: Independent  Ambulation Assistance: Independent  Transfer Assistance: Independent  Active : Yes  Mode of Transportation: Car  Occupation: Retired  OBJECTIVE:     PAIN: VITALS / O2: PRECAUTION / Melissa Jacquelyn / DRAINS:   Pre Treatment:          Post Treatment: 0/10 Vitals        Oxygen    Colostomy, John Catheter, and IV    RESTRICTIONS/PRECAUTIONS:  Restrictions/Precautions  Restrictions/Precautions: Fall Risk, Bed Alarm  Restrictions/Precautions: Fall Risk, Bed Alarm     MOBILITY: I Mod I S SBA CGA Min Mod Max Total  NT x2 Comments:   Bed Mobility    Rolling [] [] [] [x] [] [] [] [] [] [] []    Supine to Sit [] [] [] [x] [] [] [] [] [] [] []    Scooting [] [] [] [x] [] [] [] [] [] [] []    Sit to Supine [] [] [] [] [] [] [] [] [] [] []    Transfers    Sit to Stand [] [] [] [] [x] [] [] [] [] [] []    Bed to Chair [] [] [] [] [x] [] [] [] [] [] []    Stand to Sit [] [] [] [] [x] [] [] [] [] [] []     [] [] [] [] [] [] [] [] [] [] []    I=Independent, Mod I=Modified Independent, S=Supervision, SBA=Standby Assistance, CGA=Contact Guard Assistance,   Min=Minimal Assistance, Mod=Moderate Assistance, Max=Maximal Assistance, Total=Total Assistance, NT=Not Tested    BALANCE: Good Fair+ Fair Fair- Poor NT Comments   Sitting Static [x] [x] [] [] [] []    Sitting Dynamic [] [x] [] [] [] []              Standing Static [] [x] [x] [] [] []    Standing Dynamic [] [x] [x] [] [] []      GAIT: I Mod I S SBA CGA Min Mod Max Total  NT x2 Comments:   Level of Assistance [] [] [] [x] [x] [] [] [] [] [] []    Distance   feet    DME Rolling Walker    Gait Quality Narrow base of support and Path deviations     Weightbearing Status      Stairs      I=Independent, Mod I=Modified Independent, S=Supervision, SBA=Standby Assistance, CGA=Contact Guard Assistance,   Min=Minimal Assistance, Mod=Moderate Assistance, Max=Maximal Assistance, Total=Total Assistance, NT=Not Tested    PLAN:   FREQUENCY AND DURATION: 3 times/week for duration of hospital stay or until stated goals are met, whichever comes first.    TREATMENT:   TREATMENT: Therapeutic Activity (15 Minutes): Therapeutic activity included Supine to Sit, Scooting, Transfer Training, Ambulation on level ground, and Standing balance to improve functional Activity tolerance, Balance, Mobility, and Strength.     TREATMENT GRID:  N/A    AFTER TREATMENT PRECAUTIONS: Bed/Chair Locked, Call light within reach, Chair, Needs within reach, and RN notified    INTERDISCIPLINARY COLLABORATION:  RN/ PCT and PT/ PTA    EDUCATION:      TIME IN/OUT:  Time In: 1150  Time Out: 1205  Minutes: 15    Maliha Aranda PTA

## 2022-11-17 NOTE — CARE COORDINATION
CM was notified by attending physician, patient has expressed interest in STR. CM followed up with patient while working with PT. Patient has requested SNF Placement at [facility]. Referral sent via [address]. Patient has been accepted to SNF and can admit when medically stable. Diet to be advanced this afternoon. Patient will require a Rapid COVID test. No additional discharge needs expressed at this time. CM continues to follow.

## 2022-11-18 ENCOUNTER — HOSPITAL ENCOUNTER (OUTPATIENT)
Dept: PHYSICAL THERAPY | Age: 77
Setting detail: RECURRING SERIES
End: 2022-11-18
Payer: MEDICARE

## 2022-11-18 VITALS
HEART RATE: 63 BPM | DIASTOLIC BLOOD PRESSURE: 85 MMHG | SYSTOLIC BLOOD PRESSURE: 140 MMHG | RESPIRATION RATE: 12 BRPM | HEIGHT: 69 IN | OXYGEN SATURATION: 97 % | BODY MASS INDEX: 18.28 KG/M2 | WEIGHT: 123.4 LBS | TEMPERATURE: 97.7 F

## 2022-11-18 LAB
ALBUMIN SERPL-MCNC: 2.7 G/DL (ref 3.2–4.6)
ALBUMIN/GLOB SERPL: 0.8 {RATIO} (ref 0.4–1.6)
ALP SERPL-CCNC: 61 U/L (ref 50–136)
ALT SERPL-CCNC: 23 U/L (ref 12–65)
ANION GAP SERPL CALC-SCNC: 6 MMOL/L (ref 2–11)
AST SERPL-CCNC: 25 U/L (ref 15–37)
BACTERIA SPEC CULT: ABNORMAL
BACTERIA SPEC CULT: ABNORMAL
BASOPHILS # BLD: 0 K/UL (ref 0–0.2)
BASOPHILS NFR BLD: 0 % (ref 0–2)
BILIRUB SERPL-MCNC: 0.3 MG/DL (ref 0.2–1.1)
BUN SERPL-MCNC: 13 MG/DL (ref 8–23)
CALCIUM SERPL-MCNC: 8.8 MG/DL (ref 8.3–10.4)
CHLORIDE SERPL-SCNC: 103 MMOL/L (ref 101–110)
CO2 SERPL-SCNC: 32 MMOL/L (ref 21–32)
CREAT SERPL-MCNC: 1.4 MG/DL (ref 0.8–1.5)
DIFFERENTIAL METHOD BLD: ABNORMAL
EOSINOPHIL # BLD: 0.1 K/UL (ref 0–0.8)
EOSINOPHIL NFR BLD: 2 % (ref 0.5–7.8)
ERYTHROCYTE [DISTWIDTH] IN BLOOD BY AUTOMATED COUNT: 15.3 % (ref 11.9–14.6)
GLOBULIN SER CALC-MCNC: 3.4 G/DL (ref 2.8–4.5)
GLUCOSE SERPL-MCNC: 84 MG/DL (ref 65–100)
HCT VFR BLD AUTO: 32.6 % (ref 41.1–50.3)
HGB BLD-MCNC: 9.9 G/DL (ref 13.6–17.2)
IMM GRANULOCYTES # BLD AUTO: 0 K/UL (ref 0–0.5)
IMM GRANULOCYTES NFR BLD AUTO: 0 % (ref 0–5)
LYMPHOCYTES # BLD: 0.5 K/UL (ref 0.5–4.6)
LYMPHOCYTES NFR BLD: 12 % (ref 13–44)
MCH RBC QN AUTO: 28.2 PG (ref 26.1–32.9)
MCHC RBC AUTO-ENTMCNC: 30.4 G/DL (ref 31.4–35)
MCV RBC AUTO: 92.9 FL (ref 82–102)
MONOCYTES # BLD: 0.4 K/UL (ref 0.1–1.3)
MONOCYTES NFR BLD: 9 % (ref 4–12)
NEUTS SEG # BLD: 3.3 K/UL (ref 1.7–8.2)
NEUTS SEG NFR BLD: 77 % (ref 43–78)
NRBC # BLD: 0 K/UL (ref 0–0.2)
PLATELET # BLD AUTO: 152 K/UL (ref 150–450)
PMV BLD AUTO: 9.9 FL (ref 9.4–12.3)
POTASSIUM SERPL-SCNC: 3.5 MMOL/L (ref 3.5–5.1)
PROT SERPL-MCNC: 6.1 G/DL (ref 6.3–8.2)
RBC # BLD AUTO: 3.51 M/UL (ref 4.23–5.6)
SARS-COV-2 RDRP RESP QL NAA+PROBE: NOT DETECTED
SERVICE CMNT-IMP: ABNORMAL
SODIUM SERPL-SCNC: 141 MMOL/L (ref 133–143)
SOURCE: NORMAL
WBC # BLD AUTO: 4.4 K/UL (ref 4.3–11.1)

## 2022-11-18 PROCEDURE — 6370000000 HC RX 637 (ALT 250 FOR IP): Performed by: INTERNAL MEDICINE

## 2022-11-18 PROCEDURE — 80053 COMPREHEN METABOLIC PANEL: CPT

## 2022-11-18 PROCEDURE — 6360000002 HC RX W HCPCS: Performed by: FAMILY MEDICINE

## 2022-11-18 PROCEDURE — 36415 COLL VENOUS BLD VENIPUNCTURE: CPT

## 2022-11-18 PROCEDURE — 51702 INSERT TEMP BLADDER CATH: CPT

## 2022-11-18 PROCEDURE — 2580000003 HC RX 258: Performed by: NURSE PRACTITIONER

## 2022-11-18 PROCEDURE — 6360000002 HC RX W HCPCS: Performed by: NURSE PRACTITIONER

## 2022-11-18 PROCEDURE — A4216 STERILE WATER/SALINE, 10 ML: HCPCS | Performed by: NURSE PRACTITIONER

## 2022-11-18 PROCEDURE — C9113 INJ PANTOPRAZOLE SODIUM, VIA: HCPCS | Performed by: NURSE PRACTITIONER

## 2022-11-18 PROCEDURE — 85025 COMPLETE CBC W/AUTO DIFF WBC: CPT

## 2022-11-18 PROCEDURE — 6370000000 HC RX 637 (ALT 250 FOR IP)

## 2022-11-18 PROCEDURE — 87635 SARS-COV-2 COVID-19 AMP PRB: CPT

## 2022-11-18 RX ORDER — POLYETHYLENE GLYCOL 3350 17 G/17G
17 POWDER, FOR SOLUTION ORAL DAILY PRN
Qty: 527 G | Refills: 1 | Status: SHIPPED | OUTPATIENT
Start: 2022-11-18 | End: 2022-12-18

## 2022-11-18 RX ADMIN — SODIUM CHLORIDE 40 MG: 9 INJECTION, SOLUTION INTRAMUSCULAR; INTRAVENOUS; SUBCUTANEOUS at 08:55

## 2022-11-18 RX ADMIN — CARVEDILOL 6.25 MG: 3.12 TABLET, FILM COATED ORAL at 08:55

## 2022-11-18 RX ADMIN — LEVOTHYROXINE SODIUM 125 MCG: 0.07 TABLET ORAL at 05:39

## 2022-11-18 RX ADMIN — HEPARIN SODIUM 5000 UNITS: 5000 INJECTION INTRAVENOUS; SUBCUTANEOUS at 05:40

## 2022-11-18 RX ADMIN — SODIUM CHLORIDE, PRESERVATIVE FREE 10 ML: 5 INJECTION INTRAVENOUS at 08:59

## 2022-11-18 NOTE — PROGRESS NOTES
Jose Francisco Garciain  : 1945  Primary: Medicare Part A And B  Secondary: [address] [place]  [address] [facility] [address]  Phone: [phone]  Fax: [phone]    PT Visit Info:    No data recorded   OT Visit Info:  No data recorded    OUTPATIENT THERAPY:OP NOTE TYPE: Progress Report 2022               Episode  Appt Desk           [address] cancelled his appointment for outpatient physical therapy today due to  hospitalization . Will plan to follow up next during next appointment.   Thank you,  Blake Julian PT    Future Appointments   Date Time Provider Dot Gusman   2022 11:00 AM Braden Carrel [place], PT [facility]   2022  9:15 AM Andre Raza MD [id number]   2022  8:45 AM Blake Julian, PT [facility]   2022  9:45 AM Cece Joshi DO [facility] AMB   2022  8:45 AM Blake Julian, PT [facility]   2022  8:45 AM Evon Diss, PTA [facility]   2022  8:45 AM Evon Diss, PTA [facility]   12/15/2022  8:00 AM Blake Julian, PT [facility]   2022  8:45 AM Evon Diss, PTA [facility]   2022  8:00 AM Blake Julian, PT [facility]   2022  8:45 AM Evon Diss, PTA [facility]   2022  8:00 AM Blake Julian, PT [facility]

## 2022-11-18 NOTE — DISCHARGE SUMMARY
Hospitalist Discharge Summary   Admit Date:  2022  5:41 AM   DC Note date: 2022  Name:  Edilberto Steven   Age:  76 y.o. Sex:  male  :  1945   MRN:  [id number]   Room:  [facility]  PCP:  Cielo Lewis DO    Presenting Complaint: No chief complaint on file. Initial Admission Diagnosis: Small bowel obstruction (HCC) [K56.609]  SBO (small bowel obstruction) ([facility] [address] [K56.609]     Problem List for this Hospitalization (present on admission):    Principal Problem:    SBO (small bowel obstruction) (HCC)  Active Problems:    Mild episode of recurrent major depressive disorder (HCC)    Colostomy complication, unspecified (HCC)    Syncope    Elevated troponin    Rectal cancer (HCC)    Hypothyroidism    Severe protein-calorie malnutrition (HCC)    Urinary tract infection associated with indwelling urethral catheter ([facility] [address]  Resolved Problems:    * No resolved hospital problems. *      Hospital Course:  Edilberto Steven is a 76 y.o. 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 He was recently hospitalized 10/16-10/27 for LE weakness, received IVIG and followed by neuro, he was dc'd to [facility] from there 11/10 in good health. CT A/P consistent with SBO. 2 sets blood cultures drawn in ED, noted with large leukocytes in urine, started on Rocephin and urine culture ordered. Of note, has urostomy and is noted to have frequent [place]      # Syncope  ? Etiology  HD stable  Echo EF 60% w/o wma  HD stable. No further syncope     SBO  Corrected  Patient reports emptying colostomy bag this AM  Has approx 50cc liquid brown stool and gas in colostomy bag today  Tolerating diet  Encouraged miralax prn     H/o ostomy- h/o s/p sigmoidectomy     H/o depression, rectal cancer ,hypothyroidism    Debility - participating with PT, OT and requesting STR.         Disposition: STR - no PPD but no clinical symptoms or radiographic signs of TB. Diet: ADULT DIET; Dysphagia - Soft and Bite Sized; GI [place] (GERD/Peptic Ulcer)  Code Status: Full Code    Follow Ups:   Contact information for after-discharge care     Discharge Rebecca Doyle) . Service: Skilled Nursing  Contact information:  [id number] [id number]  [phone]                           Time spent in patient discharge and coordination 32 minutes. Follow up labs/diagnostics (ultimately defer to outpatient provider):  NA    Plan was discussed with patient, RN, CM. All questions answered. Patient was stable at time of discharge. Instructions given to call a physician or return if any concerns.     Current Discharge Medication List        START taking these medications    Details   polyethylene glycol (GLYCOLAX) 17 g packet Take 17 g by mouth daily as needed for Constipation  Qty: 527 g, Refills: 1           CONTINUE these medications which have NOT CHANGED    Details   amitriptyline (ELAVIL) 50 MG tablet Take 1 tablet by mouth nightly  Qty: 30 tablet, Refills: 1      carvedilol (COREG) 6.25 MG tablet Take 1 tablet by mouth 2 times daily (with meals)  Qty: 180 tablet, Refills: 3      levothyroxine (SYNTHROID) 125 MCG tablet TAKE ONE TABLET BY MOUTH ONE TIME DAILY BEFORE BREAKFAST      OLANZapine (ZYPREXA) 2.5 MG tablet Take 7.5 mg by mouth nightly           STOP taking these medications       ciprofloxacin (CIPRO) 500 MG tablet Comments:   Reason for Stopping:         clonazePAM (KLONOPIN) 2 MG tablet Comments:   Reason for Stopping:         acetaminophen (TYLENOL) 500 MG tablet Comments:   Reason for Stopping:         ibuprofen (ADVIL;MOTRIN) 600 MG tablet Comments:   Reason for Stopping:               Procedures done this admission:  * No surgery found *    Consults this admission:  [address] TO [facility] MANAGEMENT  IP CONSULT TO INFECTIOUS DISEASES  IP CONSULT TO CARDIOLOGY    Echocardiogram results:  11/14/22    TRANSTHORACIC ECHOCARDIOGRAM (TTE) COMPLETE (CONTRAST/BUBBLE/3D PRN) 11/16/2022  3:31 PM, 11/16/2022 12:00 AM (Final)    Interpretation Summary    Left Ventricle: Normal left ventricular systolic function with a visually estimated EF of 60 - 65%. Left ventricle size is normal. Normal wall thickness. Normal wall motion. Normal diastolic function. Tricuspid Valve: Trace regurgitation. Normal RVSP. The estimated RVSP is 27 mmHg. Left Atrium: Left atrium is mildly dilated. Pericardium: No pericardial effusion. Sinus rhythm between 70 to 75 bpm noted during the study. Signed by: Gia Roblero MD on 11/16/2022  3:31 PM, Signed by: Unknown Provider Result on 11/16/2022 12:00 AM      Diagnostic Imaging/Tests:   XR ABDOMEN (KUB) (SINGLE AP VIEW)    Result Date: 11/15/2022  1. Persistent dilated small bowel loops within the right hemiabdomen compatible with persistent bowel obstruction. XR ABDOMEN (KUB) (SINGLE AP VIEW)    Result Date: 11/14/2022  The bowel gas pattern is normal. There is no evidence of obstruction. There is no free air visualized on this supine view. The lung bases are clear. Residual oral contrast noted in the stomach. Nasogastric tube terminates in the mid stomach below the left hemidiaphragm. Right ureteral stent in place. XR ABDOMEN (KUB) (SINGLE AP VIEW)    Result Date: 11/14/2022  There are dilated loops of bowel noted in the right abdomen. It is difficult to differentiate between large and small bowel. Bowel obstruction is not excluded. Stool is seen in the left upper quadrant. MRI THORACIC SPINE W WO CONTRAST    Result Date: 10/19/2022  No evidence of metastasis to the thoracic spine. No visualized etiology for the patient's symptoms. CT ABDOMEN PELVIS W IV CONTRAST Additional Contrast? None    Result Date: 11/14/2022  1.  Findings compatible with small bowel obstruction. Multiple dilated loops of small bowel seen with a \"small bowel feces sign\" within the central abdomen. There are some decompressed loops of bowel within the left midabdomen, though a distinct transition point is not identified. 2. Findings are otherwise unchanged compared with the reference exam.     XR CHEST 1 VIEW    Result Date: 11/15/2022  1. Elevation of left hemidiaphragm with associated basilar subsegmental atelectasis and/or infiltrates. IR LUMBAR PUNCTURE FOR DIAGNOSIS    Result Date: 40/58/5241  Uncomplicated lumbar puncture.   Plan: The patient will recover at bedrest.        Labs: Results:       BMP, Mg, Phos Recent Labs     11/16/22  0507 11/17/22  0516 11/18/22  0537    139 141   K 3.8 3.5 3.5    103 103   CO2 29 31 32   ANIONGAP 5 5 6   BUN 14 11 13   CREATININE 1.30 1.20 1.40   LABGLOM 56* >60 51*   CALCIUM 8.4 8.4 8.8   GLUCOSE 83 75 84   MG  --  1.8  --    PHOS 2.9 3.0  --       CBC Recent Labs     11/16/22  0507 11/17/22  0516 11/18/22  0537   WBC 4.6 4.8 4.4   RBC 3.57* 3.44* 3.51*   HGB 10.5* 10.1* 9.9*   HCT 33.3* 31.6* 32.6*   MCV 93.3 91.9 92.9   MCH 29.4 29.4 28.2   MCHC 31.5 32.0 30.4*   RDW 15.6* 15.6* 15.3*   * 139* 152   MPV 10.3 9.5 9.9   NRBC 0.00 0.00 0.00   SEGS 75 76 77   LYMPHOPCT 10* 11* 12*   EOSRELPCT 2 3 2   MONOPCT 11 10 9   BASOPCT 1 0 0   IMMGRAN 1 0 0   SEGSABS 3.5 3.6 3.3   LYMPHSABS 0.5 0.5 0.5   EOSABS 0.1 0.1 0.1   MONOSABS 0.5 0.5 0.4   BASOSABS 0.0 0.0 0.0   ABSIMMGRAN 0.0 0.0 0.0      LFT Recent Labs     11/18/22  0537   BILITOT 0.3   ALKPHOS 61   AST 25   ALT 23   PROT 6.1*   LABALBU 2.7*   GLOB 3.4      Cardiac  Lab Results   Component Value Date/Time    TROPHS 275.4 11/16/2022 08:12 AM    TROPHS 292.7 11/16/2022 05:07 AM    TROPHS 401.5 11/16/2022 12:13 AM      Coags Lab Results   Component Value Date/Time    PROTIME 13.7 09/15/2021 07:45 PM    PROTIME 16.3 08/27/2021 04:30 PM    PROTIME 15.5 08/27/2021 01:30 PM INR 1.0 09/15/2021 07:45 PM    INR 1.3 08/27/2021 04:30 PM    INR 1.2 08/27/2021 01:30 PM    APTT 28.1 08/27/2021 04:30 PM    APTT 32.5 08/27/2021 01:30 PM    APTT 62.9 08/27/2021 11:56 AM      A1c Lab Results   Component Value Date/Time    LABA1C 5.2 08/03/2021 11:14 AM     08/03/2021 11:14 AM      Lipids Lab Results   Component Value Date/Time    CHOL 205 12/03/2021 08:09 AM    LDLCALC 110 12/03/2021 08:09 AM    LABVLDL 18 12/05/2019 08:30 AM    HDL 77 12/03/2021 08:09 AM    TRIG 103 12/03/2021 08:09 AM      Thyroid  Lab Results   Component Value Date/Time    TSHELE 0.72 10/16/2022 08:16 AM        Most Recent UA Lab Results   Component Value Date/Time    COLORU YELLOW/STRAW 11/14/2022 06:15 AM    APPEARANCE TURBID 11/14/2022 06:15 AM    SPECGRAV 1.012 11/14/2022 06:15 AM    LABPH 5.0 11/14/2022 06:15 AM    PROTEINU 100 11/14/2022 06:15 AM    GLUCOSEU Negative 11/14/2022 06:15 AM    GLUCOSEU Negative 11/14/2022 06:09 AM    KETUA Negative 11/14/2022 06:15 AM    BILIRUBINUR Negative 11/14/2022 06:15 AM    BILIRUBINUR Negative 05/10/2022 12:32 PM    BLOODU LARGE 11/14/2022 06:15 AM    BLOODU LARGE 11/14/2022 06:09 AM    UROBILINOGEN 0.2 11/14/2022 06:15 AM    NITRU Negative 11/14/2022 06:15 AM    LEUKOCYTESUR LARGE 11/14/2022 06:15 AM    WBCUA >100 11/14/2022 06:15 AM    RBCUA  11/14/2022 06:15 AM    EPITHUA 0-3 11/14/2022 06:15 AM    BACTERIA 3+ 11/14/2022 06:15 AM    LABCAST 0 10/21/2022 10:26 AM    MUCUS 0 10/21/2022 10:26 AM        Recent Labs     11/14/22  0930 11/14/22  0927 11/14/22  0615 10/21/22  1026 10/20/22  0821   CULTURE NO GROWTH 4 DAYS NO GROWTH 4 DAYS >100,000 COLONIES/mL ENTEROCOCCUS FAECALIS GROUP D*  50,000-100,000 COLONIES/mL CANDIDA PARAPSILOSIS COMPLEX* >100,000 COLONIES/mL MIXED SKIN SHERRY ISOLATED  THREE OR MORE TYPES OF ORGANISMS ARE PRESENT. THIS IS INDICATIVE OF CONTAMINATION DUE TO IMPROPER COLLECTION TECHNIQUE.   PLEASE REPEAT COLLECTION UNLESS PATIENT HAS STARTED ANTIBIOTIC TREATMENT.  NO GROWTH 5 DAYS       All Labs from Last 24 Hrs:  Recent Results (from the past 24 hour(s))   COVID-19, Rapid    Collection Time: 11/18/22  4:10 AM    Specimen: Nasopharyngeal   Result Value Ref Range    Source NASAL O2      SARS-CoV-2, Rapid Not detected NOTD     Comprehensive Metabolic Panel    Collection Time: 11/18/22  5:37 AM   Result Value Ref Range    Sodium 141 133 - 143 mmol/L    Potassium 3.5 3.5 - 5.1 mmol/L    Chloride 103 101 - 110 mmol/L    CO2 32 21 - 32 mmol/L    Anion Gap 6 2 - 11 mmol/L    Glucose 84 65 - 100 mg/dL    BUN 13 8 - 23 MG/DL    Creatinine 1.40 0.8 - 1.5 MG/DL    Est, Glom Filt Rate 51 (L) >60 ml/min/1.73m2    Calcium 8.8 8.3 - 10.4 MG/DL    Total Bilirubin 0.3 0.2 - 1.1 MG/DL    ALT 23 12 - 65 U/L    AST 25 15 - 37 U/L    Alk Phosphatase 61 50 - 136 U/L    Total Protein 6.1 (L) 6.3 - 8.2 g/dL    Albumin 2.7 (L) 3.2 - 4.6 g/dL    Globulin 3.4 2.8 - 4.5 g/dL    Albumin/Globulin Ratio 0.8 0.4 - 1.6     CBC with Auto Differential    Collection Time: 11/18/22  5:37 AM   Result Value Ref Range    WBC 4.4 4.3 - 11.1 K/uL    RBC 3.51 (L) 4.23 - 5.6 M/uL    Hemoglobin 9.9 (L) 13.6 - 17.2 g/dL    Hematocrit 32.6 (L) 41.1 - 50.3 %    MCV 92.9 82 - 102 FL    MCH 28.2 26.1 - 32.9 PG    MCHC 30.4 (L) 31.4 - 35.0 g/dL    RDW 15.3 (H) 11.9 - 14.6 %    Platelets 172 104 - 448 K/uL    MPV 9.9 9.4 - 12.3 FL    nRBC 0.00 0.0 - 0.2 K/uL    Differential Type AUTOMATED      Seg Neutrophils 77 43 - 78 %    Lymphocytes 12 (L) 13 - 44 %    Monocytes 9 4.0 - 12.0 %    Eosinophils % 2 0.5 - 7.8 %    Basophils 0 0.0 - 2.0 %    Immature Granulocytes 0 0.0 - 5.0 %    Segs Absolute 3.3 1.7 - 8.2 K/UL    Absolute Lymph # 0.5 0.5 - 4.6 K/UL    Absolute Mono # 0.4 0.1 - 1.3 K/UL    Absolute Eos # 0.1 0.0 - 0.8 K/UL    Basophils Absolute 0.0 0.0 - 0.2 K/UL    Absolute Immature Granulocyte 0.0 0.0 - 0.5 K/UL       No Known Allergies  Immunization History   Administered Date(s) Administered COVID-19, PFIZER PURPLE top, DILUTE for use, (age 15 y+), 30mcg/0.3mL 02/15/2021, 03/08/2021, 10/25/2021    PPD Test 01/28/2014, 08/24/2014, 09/09/2014, 11/19/2015, 05/23/2016, 03/25/2019, 04/23/2019, 09/24/2021, 09/27/2021, 02/24/2022, 06/27/2022, 10/17/2022       Recent Vital Data:  Patient Vitals for the past 24 hrs:   Temp Pulse Resp BP SpO2   11/18/22 0748 97.7 °F (36.5 °C) 63 12 (!) 140/85 97 %   11/18/22 0413 97.9 °F (36.6 °C) 71 18 135/70 91 %   11/17/22 2314 98.4 °F (36.9 °C) 57 17 120/69 98 %   11/17/22 1905 97.5 °F (36.4 °C) 57 17 124/70 98 %   11/17/22 1541 97.2 °F (36.2 °C) 58 16 (!) 153/78 99 %   11/17/22 1131 97.3 °F (36.3 °C) 62 17 (!) 155/79 99 %       Oxygen Therapy  SpO2: 97 %  Pulse Oximeter Device Mode: Intermittent  O2 Device: None (Room air)  O2 Flow Rate (L/min): 2 L/min    Estimated body mass index is 18.49 kg/m² as calculated from the following:    Height as of this encounter: 5' 8.5\" (1.74 m). Weight as of this encounter: 123 lb 6.4 oz (56 kg). Intake/Output Summary (Last 24 hours) at 11/18/2022 1101  Last data filed at 11/18/2022 1045  Gross per 24 hour   Intake 240 ml   Output 1200 ml   Net -960 ml         Physical Exam:    General:    Well nourished. No overt distress  Head:  Normocephalic, atraumatic  Eyes:  Sclerae appear normal.  Pupils equally round. HENT:  Nares appear normal, no drainage. Moist mucous membranes  Neck:  No restricted ROM. Trachea midline  CV:   RRR. No m/r/g. No JVD  Lungs:   CTAB. No wheezing, rhonchi, or rales. Respirations even, unlabored  Abdomen:   Soft, nontender, nondistended. Ostomy with brown liquid stool/gas  Extremities: Warm and dry. No cyanosis or clubbing. No edema. Skin:     No rashes. Normal coloration  Neuro:  CN II-XII grossly intact. Psych:  Normal mood and affect. Signed:  Gemma Perdomo DO    Part of this note may have been written by using a voice dictation software.   The note has been proof read but may still contain some grammatical/other typographical errors.

## 2022-11-18 NOTE — PROGRESS NOTES
TRANSFER - OUT REPORT:    Verbal report given to Kelly Patton LPN on [address]  being transferred to [address] [place] [address] for routine progression of patient care       Report consisted of patient's Situation, Background, Assessment and   Recommendations(SBAR). Information from the following report(s) Nurse Handoff Report was reviewed with the receiving nurse. [place] Assessment: No data recorded  Lines:   Peripheral IV 11/17/22 Proximal;Right Forearm (Active)   Site Assessment Clean, dry & intact 11/18/22 0710   Line Status Infusing 11/18/22 0710   Line Care Connections checked and tightened 11/18/22 0434   Phlebitis Assessment No symptoms 11/18/22 0710   Infiltration Assessment 0 11/18/22 0710   Alcohol Cap Used Yes 11/18/22 0434   Dressing Status Clean, dry & intact 11/18/22 0710   Dressing Type Transparent 11/18/22 0710        Opportunity for questions and clarification was provided.       Patient transported with:  EMS

## 2022-11-18 NOTE — PROGRESS NOTES
Reviewed notes for new spiritual concerns. HX RECTAL CANCER 2013     MILD EPISODE MAJOR DEPRESSION      Good visit with patient    Calm    encouraged          Will follow as needed.

## 2022-11-18 NOTE — PROGRESS NOTES
Pt resting in bed at present time without complaints. Hourly rounds completed, all needs met this shift. Bed locked and lowered, call light within reach. Will give report to oncoming day shift nurse.

## 2022-11-18 NOTE — PROGRESS NOTES
Hospitalist Progress Note   Admit Date:  2022  5:41 AM   Name:  Abigail Nolasco   Age:  76 y.o. Sex:  male  :  1945   MRN:  [id number]   Room:  [facility]/    Presenting Complaint: No chief complaint on file. Reason(s) for Admission: Small bowel obstruction ([facility] [address] [K56.609]  SBO (small bowel obstruction) [facility]) [address] [place] [address] Course:   Gustavo Valle is a 76 y.o. 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 He was recently hospitalized 10/16-10/27 for LE weakness, received IVIG and followed by neuro, he was dc'd to [facility] and states GA from there 11/10 in good health. CT A/P consistent with SBO. 2 sets blood cultures drawn in ED, noted with large leukocytes in urine, started on Rocephin and urine culture ordered. Of note, he is noted with frequent UTIs, has urostomy. Subjective & 24hr Events (22): Pt reports tolerance of liquids since yesterday. Has had small amount of colostomy output. No nausea/vomiting or abd pain. Is requesting STR at discharge at [address] [place] [address]:     # Syncope  ? Etiology   - Troponin ordered and trended up overnight but now trending down again. Denies chest pain. Cardiology has seen, suspects r/t demand ischemia in setting of SBO. Echo EF 60% w/o wma  Cont to monitor on telemetry   - HD stable. No further syncope     SBO   - had colostomy output yesterday. Will clamp NGT and trial liquids. If tolerates can dc NGT tonight. - scant colostomy output. Passing flatus. Advance to soft diet.        Prob uti/chronic indwelling catheter/h/o ureteral stent to be replaced in dec 2022  ID dced antibiotics, remains afebrile     H/o ostomy- h/o s/p sigmoidectomy     H/o depression, rectal cancer ,hypothyroidism     Heparin  Full code      Anticipated discharge needs: Likely DC home 1-2 days if tolerating diet. Diet:  ADULT DIET; Regular; GI [place] (GERD/Peptic Ulcer)  DVT PPx: hep sq  Code status: Full Code    Hospital Problems:  Principal Problem:    SBO (small bowel obstruction) (HCC)  Active Problems:    Mild episode of recurrent major depressive disorder (HCC)    Colostomy complication, unspecified (HCC)    Syncope    Elevated troponin    Rectal cancer (HCC)    Hypothyroidism    Severe protein-calorie malnutrition (HCC)    Urinary tract infection associated with indwelling urethral catheter ([address]  Resolved Problems:    * No resolved hospital problems. *      Objective:   Patient Vitals for the past 24 hrs:   Temp Pulse Resp BP SpO2   11/17/22 1905 97.5 °F (36.4 °C) 57 17 124/70 98 %   11/17/22 1541 97.2 °F (36.2 °C) 58 16 (!) 153/78 99 %   11/17/22 1131 97.3 °F (36.3 °C) 62 17 (!) 155/79 99 %   11/17/22 0730 97.7 °F (36.5 °C) 67 18 134/75 97 %   11/17/22 0715 -- 82 -- -- --   11/17/22 0324 98 °F (36.7 °C) 84 17 127/76 92 %   11/17/22 0204 -- 79 -- -- --   11/16/22 2335 -- 75 -- -- --   11/16/22 2256 97.9 °F (36.6 °C) 98 16 128/77 96 %         Oxygen Therapy  SpO2: 98 %  Pulse Oximeter Device Mode: Intermittent  O2 Device: None (Room air)  O2 Flow Rate (L/min): 2 L/min    Estimated body mass index is 18.49 kg/m² as calculated from the following:    Height as of this encounter: 5' 8.5\" (1.74 m). Weight as of this encounter: 123 lb 6.4 oz (56 kg). Intake/Output Summary (Last 24 hours) at 11/17/2022 2215  Last data filed at 11/17/2022 2152  Gross per 24 hour   Intake 240 ml   Output 3675 ml   Net -3435 ml           Physical Exam:     Blood pressure 124/70, pulse 57, temperature 97.5 °F (36.4 °C), temperature source Oral, resp. rate 17, height 5' 8.5\" (1.74 m), weight 123 lb 6.4 oz (56 kg), SpO2 98 %. General:    Well nourished. Head:  Normocephalic, atraumatic  Eyes:  Sclerae appear normal.  Pupils equally round. ENT:  Nares appear normal, no drainage.   Moist oral mucosa  Neck:  No restricted ROM. Trachea midline   CV:   RRR. No m/r/g. No jugular venous distension. Lungs:   CTAB. No wheezing, rhonchi, or rales. Symmetric expansion. Abdomen: Bowel sounds present. Soft, nontender, nondistended. Colostomy with scant brown stool/air in bag  Extremities: No cyanosis or clubbing. No edema  Skin:     No rashes and normal coloration. Warm and dry. Neuro:  CN II-XII grossly intact. Sensation intact. A&Ox3  Psych:  Normal mood and affect.       I have personally reviewed labs and tests showing:  Recent Labs:  Recent Results (from the past 48 hour(s))   Troponin    Collection Time: 11/16/22 12:13 AM   Result Value Ref Range    Troponin, High Sensitivity 401.5 (HH) 0 - 14 pg/mL   Basic Metabolic Panel w/ Reflex to MG    Collection Time: 11/16/22  5:07 AM   Result Value Ref Range    Sodium 138 133 - 143 mmol/L    Potassium 3.8 3.5 - 5.1 mmol/L    Chloride 104 101 - 110 mmol/L    CO2 29 21 - 32 mmol/L    Anion Gap 5 2 - 11 mmol/L    Glucose 83 65 - 100 mg/dL    BUN 14 8 - 23 MG/DL    Creatinine 1.30 0.8 - 1.5 MG/DL    Est, Glom Filt Rate 56 (L) >60 ml/min/1.73m2    Calcium 8.4 8.3 - 10.4 MG/DL   CBC with Auto Differential    Collection Time: 11/16/22  5:07 AM   Result Value Ref Range    WBC 4.6 4.3 - 11.1 K/uL    RBC 3.57 (L) 4.23 - 5.6 M/uL    Hemoglobin 10.5 (L) 13.6 - 17.2 g/dL    Hematocrit 33.3 (L) 41.1 - 50.3 %    MCV 93.3 82 - 102 FL    MCH 29.4 26.1 - 32.9 PG    MCHC 31.5 31.4 - 35.0 g/dL    RDW 15.6 (H) 11.9 - 14.6 %    Platelets 630 (L) 678 - 450 K/uL    MPV 10.3 9.4 - 12.3 FL    nRBC 0.00 0.0 - 0.2 K/uL    Seg Neutrophils 75 43 - 78 %    Lymphocytes 10 (L) 13 - 44 %    Monocytes 11 4.0 - 12.0 %    Eosinophils % 2 0.5 - 7.8 %    Basophils 1 0.0 - 2.0 %    Immature Granulocytes 1 0.0 - 5.0 %    Segs Absolute 3.5 1.7 - 8.2 K/UL    Absolute Lymph # 0.5 0.5 - 4.6 K/UL    Absolute Mono # 0.5 0.1 - 1.3 K/UL    Absolute Eos # 0.1 0.0 - 0.8 K/UL    Basophils Absolute 0.0 0.0 - 0.2 K/UL    Absolute Immature Granulocyte 0.0 0.0 - 0.5 K/UL    RBC Comment SLIGHT  ANISOCYTOSIS + POIKILOCYTOSIS        WBC Comment Result Confirmed By Smear      Platelet Comment ADEQUATE      Differential Type AUTOMATED     Phosphorus    Collection Time: 11/16/22  5:07 AM   Result Value Ref Range    Phosphorus 2.9 2.3 - 3.7 MG/DL   Troponin    Collection Time: 11/16/22  5:07 AM   Result Value Ref Range    Troponin, High Sensitivity 292.7 (HH) 0 - 14 pg/mL   Troponin    Collection Time: 11/16/22  8:12 AM   Result Value Ref Range    Troponin, High Sensitivity 275.4 (HH) 0 - 14 pg/mL   Transthoracic echocardiogram (TTE) complete with contrast, bubble, strain, and 3D PRN    Collection Time: 11/16/22  2:58 PM   Result Value Ref Range    AV Mean Velocity 0.8 m/s    AV Mean Gradient 3 mmHg    AV VTI 22.5 cm    AV Peak Velocity 1.3 m/s    AV Peak Gradient 7 mmHg    AV Area by VTI 2.7 cm2    AV Area by Peak Velocity 2.1 cm2    Aortic Root 3.7 cm    IVC Proxmal 1.9 cm    IVSd 0.8 0.6 - 1.0 cm    LVIDd 4.6 4.2 - 5.9 cm    LVIDs 3.3 cm    LVOT Diameter 2.2 cm    LVOT Mean Gradient 1 mmHg    LVOT VTI 15.8 cm    LVOT Peak Velocity 0.7 m/s    LVOT Peak Gradient 2 mmHg    LVPWd 0.9 0.6 - 1.0 cm    LV E' Lateral Velocity 18 cm/s    LV E' Septal Velocity 7 cm/s    LVOT Area 3.8 cm2    LVOT SV 60.0 ml    LA Diameter 3.7 cm    MV E Wave Deceleration Time 238.0 ms    MV A Velocity 0.81 m/s    MV E Velocity 0.52 m/s    PV AT 82.0 ms    RV Free Wall Peak S' 12 cm/s    TAPSE 1.9 1.7 cm    TR Max Velocity 2.44 m/s    TR Peak Gradient 24 mmHg    Body Surface Area 1.64 m2    Fractional Shortening 2D 28 28 - 44 %    LVIDd Index 2.75 cm/m2    LVIDs Index 1.98 cm/m2    LV RWT Ratio 0.39     LV Mass 2D 127.7 88 - 224 g    LV Mass 2D Index 76.4 49 - 115 g/m2    MV E/A 0.64     E/E' Ratio (Averaged) 5.16     E/E' Lateral 2.89     E/E' Septal 7.43     LVOT Stroke Volume Index 35.9 mL/m2    LA Size Index 2.22 cm/m2    LA/AO Root Ratio 1.00 Ao Root Index 2.22 cm/m2    AV Velocity Ratio 0.54     LVOT:AV VTI Index 0.70     QUE/BSA VTI 1.6 cm2/m2    QUE/BSA Peak Velocity 1.3 cm2/m2    Est. RA Pressure 3 mmHg    RVSP 27 mmHg    Left Ventricular Ejection Fraction 63     LVEF MODALITY ECHO    Basic Metabolic Panel w/ Reflex to MG    Collection Time: 11/17/22  5:16 AM   Result Value Ref Range    Sodium 139 133 - 143 mmol/L    Potassium 3.5 3.5 - 5.1 mmol/L    Chloride 103 101 - 110 mmol/L    CO2 31 21 - 32 mmol/L    Anion Gap 5 2 - 11 mmol/L    Glucose 75 65 - 100 mg/dL    BUN 11 8 - 23 MG/DL    Creatinine 1.20 0.8 - 1.5 MG/DL    Est, Glom Filt Rate >60 >60 ml/min/1.73m2    Calcium 8.4 8.3 - 10.4 MG/DL   CBC with Auto Differential    Collection Time: 11/17/22  5:16 AM   Result Value Ref Range    WBC 4.8 4.3 - 11.1 K/uL    RBC 3.44 (L) 4.23 - 5.6 M/uL    Hemoglobin 10.1 (L) 13.6 - 17.2 g/dL    Hematocrit 31.6 (L) 41.1 - 50.3 %    MCV 91.9 82 - 102 FL    MCH 29.4 26.1 - 32.9 PG    MCHC 32.0 31.4 - 35.0 g/dL    RDW 15.6 (H) 11.9 - 14.6 %    Platelets 757 (L) 951 - 450 K/uL    MPV 9.5 9.4 - 12.3 FL    nRBC 0.00 0.0 - 0.2 K/uL    Differential Type AUTOMATED      Seg Neutrophils 76 43 - 78 %    Lymphocytes 11 (L) 13 - 44 %    Monocytes 10 4.0 - 12.0 %    Eosinophils % 3 0.5 - 7.8 %    Basophils 0 0.0 - 2.0 %    Immature Granulocytes 0 0.0 - 5.0 %    Segs Absolute 3.6 1.7 - 8.2 K/UL    Absolute Lymph # 0.5 0.5 - 4.6 K/UL    Absolute Mono # 0.5 0.1 - 1.3 K/UL    Absolute Eos # 0.1 0.0 - 0.8 K/UL    Basophils Absolute 0.0 0.0 - 0.2 K/UL    Absolute Immature Granulocyte 0.0 0.0 - 0.5 K/UL   Phosphorus    Collection Time: 11/17/22  5:16 AM   Result Value Ref Range    Phosphorus 3.0 2.3 - 3.7 MG/DL   Magnesium    Collection Time: 11/17/22  5:16 AM   Result Value Ref Range    Magnesium 1.8 1.8 - 2.4 mg/dL       I have personally reviewed imaging studies showing: Other Studies:  XR ABDOMEN (KUB) (SINGLE AP VIEW)   Final Result   1.   Persistent dilated small bowel loops within the right hemiabdomen compatible   with persistent bowel obstruction. XR CHEST 1 VIEW   Final Result   1. Elevation of left hemidiaphragm with associated basilar subsegmental   atelectasis and/or infiltrates. XR ABDOMEN (KUB) (SINGLE AP VIEW)   Final Result   The bowel gas pattern is normal. There is no evidence of   obstruction. There is no free air visualized on this supine view. The lung bases   are clear. Residual oral contrast noted in the stomach. Nasogastric tube   terminates in the mid stomach below the left hemidiaphragm. Right ureteral stent   in place. CT ABDOMEN PELVIS W IV CONTRAST Additional Contrast? None   Final Result      1. Findings compatible with small bowel obstruction. Multiple dilated loops of   small bowel seen with a \"small bowel feces sign\" within the central abdomen. There are some decompressed loops of bowel within the left midabdomen, though a   distinct transition point is not identified. 2. Findings are otherwise unchanged compared with the reference exam.               XR ABDOMEN (KUB) (SINGLE AP VIEW)   Final Result   There are dilated loops of bowel noted in the right abdomen. It is difficult to   differentiate between large and small bowel. Bowel obstruction is not excluded. Stool is seen in the left upper quadrant.                       Current Meds:  Current Facility-Administered Medications   Medication Dose Route Frequency    amitriptyline (ELAVIL) tablet 100 mg  100 mg Oral Nightly    carvedilol (COREG) tablet 6.25 mg  6.25 mg Oral BID WC    clonazePAM (KLONOPIN) tablet 4 mg  4 mg Oral Nightly PRN    levothyroxine (SYNTHROID) tablet 125 mcg  125 mcg Oral Daily    OLANZapine (ZYPREXA) tablet 7.5 mg  7.5 mg Oral Nightly    metoprolol (LOPRESSOR) injection 2.5 mg  2.5 mg IntraVENous Q6H PRN    heparin (porcine) injection 5,000 Units  5,000 Units SubCUTAneous 3 times per day    0.9 % sodium chloride bolus  100 mL IntraVENous Once sodium chloride flush 0.9 % injection 5-40 mL  5-40 mL IntraVENous 2 times per day    sodium chloride flush 0.9 % injection 5-40 mL  5-40 mL IntraVENous PRN    0.9 % sodium chloride infusion   IntraVENous PRN    ondansetron (ZOFRAN-ODT) disintegrating tablet 4 mg  4 mg Oral Q8H PRN    Or    ondansetron (ZOFRAN) injection 4 mg  4 mg IntraVENous Q6H PRN    polyethylene glycol (GLYCOLAX) packet 17 g  17 g Oral Daily PRN    acetaminophen (TYLENOL) tablet 650 mg  650 mg Oral Q6H PRN    Or    acetaminophen (TYLENOL) suppository 650 mg  650 mg Rectal Q6H PRN    pantoprazole (PROTONIX) 40 mg in sodium chloride (PF) 0.9 % 10 mL injection  40 mg IntraVENous Q12H    lactated ringers infusion   IntraVENous Continuous    polyethylene glycol (GLYCOLAX) packet 17 g  17 g Oral BID    clonazePAM (KLONOPIN) tablet 4 mg  4 mg Per NG tube Nightly    traMADol (ULTRAM) tablet 50 mg  50 mg Per NG tube Q6H PRN       Signed:  Brigido Morgan DO    Part of this note may have been written by using a voice dictation software. The note has been proof read but may still contain some grammatical/other typographical errors.

## 2022-11-18 NOTE — PROGRESS NOTES
Good visit with patient  Acknowledged his difficult medical history  Encouraged him  No distress noted

## 2022-11-18 NOTE — CARE COORDINATION
Chart reviewed by . Patient is medically cleared for discharge today. Patient to discharge to [facility] via [facility]. Patient's room number is 419 and report [phone]. Transport scheduled at 1230. Primary Nurse aware. Patient confirmed he will notify family of hospital discharge. No additional discharge needs noted or expressed at this time. Please consult CM if needs arise. 11/14/22 3155   Service Assessment   Patient Orientation Alert and Oriented   Cognition Alert   History Provided By Patient   Primary Caregiver Self   Accompanied By/Relationship none   Support Systems Friends/Neighbors   Patient's Healthcare Decision Maker is: Legal Next of Viv 67   PCP Verified by CM Yes  (Bucky Armenta DO had appointment after recent admission but did not go states its been greater than 6mths since last MD visit.)   Last Visit to PCP Within last 6 months   Prior Functional Level Independent in ADLs/IADLs   Current Functional Level Assistance with the following:   Can patient return to prior living arrangement Yes   Ability to make needs known: Good   Family able to assist with home care needs: Yes   Would you like for me to discuss the discharge plan with any other family members/significant others, and if so, who?  No   Financial Resources [employer]   (outpt PT at [address] therapy @[place])   Social/Functional History   Lives With Alone   Type of Home Apartment  (Town home)   Home Layout Two level   Home Access Level entry   [address]   [address]   (none)   ADL Assistance Independent   Homemaking Assistance Independent   Ambulation Assistance Independent   Transfer Assistance Independent   Active  Yes   Mode of Transportation Car   Occupation Retired   Discharge Planning   Type of [address] Prior To Admission Other ([place])  (outpt PT)   Potential [address] [facility]   DME Ordered? No   Potential Assistance Purchasing Medications No   Type of Home Care Services None   Patient expects to be discharged to: Skilled nursing facility  ([facility])   History of falls? 0   Services At/After Discharge   Transition of Care Consult (CM Consult) Discharge Planning;SNF   Services At/After Discharge [facility] (SNF)   Khadijah Provided? No   Mode of Transport at Discharge BLS  ([address])   [facility] Transport Time of Discharge 1230   Confirm Follow Up Transport Family   Condition of Participation: Discharge Planning   The Plan for Transition of Care is related to the following treatment goals: obtain STR to strengthen and improve physical mobility. The Patient and/or Patient Representative was provided with a Choice of Provider? Patient   The Patient and/Or Patient Representative agree with the Discharge Plan? Yes   Freedom of Choice list was provided with basic dialogue that supports the patient's individualized plan of care/goals, treatment preferences, and shares the quality data associated with the providers?   Yes

## 2022-11-21 ENCOUNTER — CARE COORDINATION (OUTPATIENT)
Dept: CARE COORDINATION | Facility: CLINIC | Age: 77
End: 2022-11-21

## 2022-11-21 ASSESSMENT — ENCOUNTER SYMPTOMS
COLOR CHANGE: 0
VOMITING: 0
CONSTIPATION: 1
ABDOMINAL DISTENTION: 1
CHEST TIGHTNESS: 0
NAUSEA: 1
SHORTNESS OF BREATH: 0
BACK PAIN: 0
WHEEZING: 0
COUGH: 0
ABDOMINAL PAIN: 1

## 2022-11-21 NOTE — CARE COORDINATION
Transition of care outreach postponed for 14 days due to patient's discharge to SNF. [facility].

## 2022-11-22 ENCOUNTER — APPOINTMENT (OUTPATIENT)
Dept: PHYSICAL THERAPY | Age: 77
End: 2022-11-22
Payer: MEDICARE

## 2022-11-23 ENCOUNTER — HOSPITAL ENCOUNTER (OUTPATIENT)
Dept: PHYSICAL THERAPY | Age: 77
Setting detail: RECURRING SERIES
End: 2022-11-23
Payer: MEDICARE

## 2022-11-23 NOTE — PROGRESS NOTES
Nathen Nolasco  : 1945  Primary: Medicare Part A And B  Secondary: [address] [facility] [place]  [address] [facility] [address]  Phone: [phone]  Fax: [phone]    PT Visit Info:    No data recorded   OT Visit Info:  No data recorded    OUTPATIENT THERAPY:OP NOTE TYPE: Progress Report 2022               Episode  Appt Desk           [address] cancelled his appointment for outpatient physical therapy today due to  hospitalization . Will plan to follow up next during next appointment.   Thank you,  Giovany Lizarraga PT    Future Appointments   Date Time Provider Dot [place]ma   2022  7:00 PM Dhaval Mcdonald, PT [facility]   2022  9:15 AM Cam Mejia MD [id number]   2022  8:45 AM Giovany Lizarraga, PT [facility]   2022  9:45 AM DO ROCHELLE De Leon [facility]   2022  8:45 AM [facility]   2022  8:45 AM [facility]rien[facility]   2022  8:45 AM Darrick Obrien, PTA [facility]   12/15/2022  8:00 AM Darrick Obrien, PTA [facility]   2022  8:45 AM Darrick Obrien, PTA [facility]   2022  8:00 AM [facility]   2022  8:45 AM Darrick Obrien, PTA [facility]   2022  8:00 AM Giovany Lizarraga, PT [facility]

## 2022-11-29 ENCOUNTER — APPOINTMENT (OUTPATIENT)
Dept: PHYSICAL THERAPY | Age: 77
End: 2022-11-29
Payer: MEDICARE

## 2022-11-30 ENCOUNTER — OFFICE VISIT (OUTPATIENT)
Dept: UROLOGY | Age: 77
End: 2022-11-30
Payer: MEDICARE

## 2022-11-30 DIAGNOSIS — R33.9 URINARY RETENTION: Primary | ICD-10-CM

## 2022-11-30 DIAGNOSIS — Z97.8 CHRONIC INDWELLING FOLEY CATHETER: ICD-10-CM

## 2022-11-30 DIAGNOSIS — S37.20XD: ICD-10-CM

## 2022-11-30 DIAGNOSIS — N13.39 OTHER HYDRONEPHROSIS: ICD-10-CM

## 2022-11-30 PROCEDURE — 99213 OFFICE O/P EST LOW 20 MIN: CPT | Performed by: UROLOGY

## 2022-11-30 PROCEDURE — G8484 FLU IMMUNIZE NO ADMIN: HCPCS | Performed by: UROLOGY

## 2022-11-30 PROCEDURE — 1036F TOBACCO NON-USER: CPT | Performed by: UROLOGY

## 2022-11-30 PROCEDURE — G8427 DOCREV CUR MEDS BY ELIG CLIN: HCPCS | Performed by: UROLOGY

## 2022-11-30 PROCEDURE — G8419 CALC BMI OUT NRM PARAM NOF/U: HCPCS | Performed by: UROLOGY

## 2022-11-30 PROCEDURE — 1111F DSCHRG MED/CURRENT MED MERGE: CPT | Performed by: UROLOGY

## 2022-11-30 PROCEDURE — 1123F ACP DISCUSS/DSCN MKR DOCD: CPT | Performed by: UROLOGY

## 2022-11-30 ASSESSMENT — ENCOUNTER SYMPTOMS
BACK PAIN: 0
NAUSEA: 0

## 2022-11-30 NOTE — PROGRESS NOTES
[facility]  [address] [facility] [address] [facility], [address] [facility]  [phone]    Melinda Nolasco  : 1945    Chief Complaint   Patient presents with    Follow-up          HPI     Abiel Garcia is a 76 y.o.  male with a PMH ofbladder injury and R hydronephrosis who returns for follow up. He has a PMH of kidney stones, pelvic abscess and urinary retention who returns for follow up today. He also has had multiple intra-abdominal surgeries, including drainage of intra-abdominal abscess, lysis of adhesions, diverting colostomy, complete proctectomy, removal of 10 cm small bowel and removal of infected mesh. Seen in hospital in 2021 after having surgery for completion proctectomy by Dr. Ariana Erickson. A cystotomy occurred during surgery and was repaired intra-operatively by Dr. Ariana Erickson. Unfortunately, this has not healed and he now has bilateral nephrostomy tubes and indwelling catheter in place for urinary diversion since 2021. The cystotomy appears to be walled off and communicates within the pelvis but no intraperitoneally. Today, he reports doing well as long as his catheter. Comes in for monthly 20 Fr coudet cath changes but wants to change this to q3 weeks. He had bilateral NTs in to help divert the urine initially. L NT was removed eventually as he developed a contained entero-vesical fistula that does okay as long as it is managed with carr from below. R NT converted to R stent in 2021, however, due to persistent R hydronephrosis on IR antegrade pyelogram.  He had stent change 2022 and is due for stent exchange 2022 on the R.       Presents to discuss R stent removal vs. Replacement today. Does report occasional issues with catheter not draining / clogging that require ER visits. Wants to discuss more frequent cath changes instead. Otherwise doing well without other changes in medical history since last visit with me.       Past Medical History:   Diagnosis Date    Acute deep vein thrombosis (DVT) of non-extremity vein 5/20/2019    Family history of malignant neoplasm of gastrointestinal tract     mother colon/ovarian cancer 67    Fecal incontinence     LAR syndrome    John catheter in place 2022    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    surgery and radiation    Psychiatric disorder     anxiety     Past Surgical History:   Procedure Laterality Date    COLONOSCOPY  last 2/3/15    Roberto--no polyps--3 year recall    COLONOSCOPY N/A 3/5/2021    COLONOSCOPY/BMI 19 performed by Tangela Medina MD at [facility] ENDOSCOPY    COLONOSCOPY N/A 2/12/2018    COLONOSCOPY / BMI=21 performed by Bibi Sawyer MD at [place] [id number]  2/12/2018         COLONOSCOPY THRU STOMA,LESN RMVL W/SNARE  3/5/2021         COLOSTOMY  5/11/16    CT RETROPERITONEAL PERC DRAIN  5/13/2021    CT RETROPERITONEAL PERC DRAIN 5/13/2021 [facility] CT SCAN    CT RETROPERITONEAL PERC DRAIN  5/20/2021    CT RETROPERITONEAL PERC DRAIN 5/20/2021 [facility] CT SCAN    GI  4/2016    Sacral nerve stimlator lead trial (unsucessful) and removal    HERNIA REPAIR      and Colostomy in May    HERNIA REPAIR  3/2015, 5/2016    IR ABSCESS EVAL THRU EXISTING CATH  11/24/2021    IR ABSCESS EVAL THRU EXISTING CATH  6/4/2021    IR ABSCESS EVAL THRU EXISTING CATH  5/20/2021    IR NEPHROSTOGRAM EXISTING ACCESS  12/10/2021    IR NEPHROSTOGRAM EXISTING ACCESS  10/4/2021    IR NEPHROSTOMY EXCHANGE CATHETER  12/7/2021    IR NEPHROSTOMY EXCHANGE CATHETER  11/3/2021    IR NEPHROSTOMY EXCHANGE CATHETER  10/28/2021    IR NEPHROSTOMY PERCUTANEOUS LEFT  9/16/2021    IR NEPHROSTOMY PERCUTANEOUS LEFT  9/16/2021    IR NEPHROSTOMY PERCUTANEOUS LEFT 9/16/2021 [facility] SPECIALS    IR REPLCE T CVC WO PORT SAME ACC  5/30/2019    IR TUBE CHANGE  8/5/2021    IR TUBE CHANGE  7/8/2021    IR TUBE CHANGE  6/14/2021    IR TUBE CHANGE  5/27/2021    IR TUNNELED CATHETER PLACEMENT GREATER THAN 5 YEARS  5/20/2019    IR TUNNELED CATHETER PLACEMENT GREATER THAN 5 YEARS  5/20/2019    IR TUNNELED CATHETER PLACEMENT GREATER THAN 5 YEARS 5/20/2019 [facility] SPECIALS    OTHER SURGICAL HISTORY Bilateral     cataracts  2017    OTHER SURGICAL HISTORY  10/7/2013    Roberto---endorectal us    POLYPECTOMY      TOTAL COLECTOMY  11/2013    Roberto--lap LAR with coloproctostomy, mobilization splenic flexure, diverting loop ileostomy    TOTAL COLECTOMY Right 8/2014    for leak    VASCULAR SURGERY Left 4/14    single lumen port/subsequently removed     Current Outpatient Medications   Medication Sig Dispense Refill    polyethylene glycol (GLYCOLAX) 17 g packet Take 17 g by mouth daily as needed for Constipation 527 g 1    amitriptyline (ELAVIL) 50 MG tablet Take 1 tablet by mouth nightly 30 tablet 1    carvedilol (COREG) 6.25 MG tablet Take 1 tablet by mouth 2 times daily (with meals) 180 tablet 3    levothyroxine (SYNTHROID) 125 MCG tablet TAKE ONE TABLET BY MOUTH ONE TIME DAILY BEFORE BREAKFAST      OLANZapine (ZYPREXA) 2.5 MG tablet Take 7.5 mg by mouth nightly       No current facility-administered medications for this visit.      No Known Allergies  Social History     Socioeconomic History    Marital status: Single     Spouse name: Not on file    Number of children: Not on file    Years of education: Not on file    Highest education level: Not on file   Occupational History    Not on file   Tobacco Use    Smoking status: Former     Packs/day: 1.00     Types: Cigarettes     Quit date: 1963     Years since quittin.0    Smokeless tobacco: Never   Substance and Sexual Activity    Alcohol use: Not Currently     Alcohol/week: 11.7 standard drinks    Drug use: No    Sexual activity: Not on file   Other Topics Concern    Not on file   Social History Narrative    Not on file     Social Determinants of Health     Financial Resource Strain: Not on file   Food Insecurity: Not on file   Transportation Needs: Not on file   Physical Activity: Not on file   Stress: Not on file   Social Connections: Not on file   Intimate Partner Violence: Not on file   Housing Stability: Not on file     Family History   Problem Relation Age of Onset    Cancer Mother         colon and ovarian    Cancer Brother         prostate    Alcohol Abuse Brother     Cancer Maternal Grandmother         bone    Alcohol Abuse Father     Stroke Paternal Grandfather     Alcohol Abuse Sister        Review of Systems  Constitutional:   Negative for fever. GI:  Negative for nausea. Musculoskeletal:  Negative for back pain. There were no vitals taken for this visit. GENERAL: No acute distress, Awake, Alert, Oriented X 3, Gait normal  CARDIAC: regular rate and rhythm  CHEST AND LUNG: Easy work of breathing, clear to auscultation bilaterally, no cyanosis  ABDOMEN: soft, non tender, non-distended, positive bowel sounds, no organomegaly, no palpable masses, no guarding, no rebound tenderness  SKIN: No rash, no erythema, no lacerations or abrasions, no ecchymosis  NEUROLOGIC: cranial nerves 2-12 grossly intact       Assessment and Plan    ICD-10-CM    1. Urinary retention  R33.9       2. Other hydronephrosis  N13.39       3. Bladder injury with open wound into cavity, subsequent encounter  S37.20XD       4. Chronic indwelling John catheter  Z97.8         Urinary Retention:   John will be changed next week in OR. Prefers 20 Fr coudet catheter.   Wants to increase frequency of changes to q3 weeks. Bladder Rupture:   Managed with indwelling carr. Continue monthly changes. No communication with peritoneal cavity. Will continue. R Hydronephrosis:   We discussed R stent today. Will plan for cystoscopy, right retrograde pyelogram, right ureteral stent removal vs. Exchange in the OR as scheduled next week. Risks/benefits/details of procedure reviewed. He wants to proceed. I have spent 20 minutes today reviewing previous notes, test results and face to face with the patient as well as documenting. Jaime Schumacher M.D.     [facility]  [address], [facility] [address] [facility]  Phone: [phone]  Fax: [phone]

## 2022-12-02 ENCOUNTER — CARE COORDINATION (OUTPATIENT)
Dept: CARE COORDINATION | Facility: CLINIC | Age: 77
End: 2022-12-02

## 2022-12-02 NOTE — CARE COORDINATION
[address] [facility] Update Call    2022    Patient: Gemma Nolasco Patient : 1945   MRN: [id number]  Reason for Admission: SBO d/c to SNF. Discharge Date: 22 RARS: Readmission Risk Score: 25.2     Patient d/c from short term rehab     Care Transitions Post Acute Facility Update    Care Transitions Interventions  Post Acute Facility Update       Care Transitions Outreach Attempt    Call within 2 business days of discharge: Yes   Attempted to reach patient for transitions of care follow up. Unable to reach patient. Patient: Gemma Nolasco Patient : 1945 MRN: [id number]    Last Discharge [address]       Date Complaint Diagnosis Description Type Department Provider    22 Colostomy Problem (EMS) Small bowel obstruction ([address] . .. ED to Hosp-Admission (Discharged) (ADMITTED) [facility] Brooke Wright DO; [address] . .. Was this an external facility discharge?  Yes, 2022  Discharge Facility: [facility]    Noted following upcoming appointments from discharge chart review:   [facility] follow up appointment(s):   Future Appointments   Date Time Provider Dot Gusman   2022  9:45 AM Dickson Canavan, DO [facility] AMB   2022  8:45 AM Veronica Kearney, PT [facility] SFO   2022  8:45 AM Deretha Sous, PTA [facility] SFO   2022  9:30 AM Dickson Canavan, DO [facility] AMB   2022  8:45 AM Deretha Sous, PTA [facility]   12/15/2022  8:00 AM Deretha Sous, PTA [facility] SFO   2022  8:45 AM Deretha Sous, PTA [facility] SFO   2022  8:00 AM Veronica Kearney PT [facility] SFO   2022  8:45 AM Deretha Sous, PTA [facility] SFO   2022  8:00 AM Rony Boothe, PT [facility]     Non-[facility] follow up appointment(s): césar

## 2022-12-05 ENCOUNTER — APPOINTMENT (OUTPATIENT)
Dept: GENERAL RADIOLOGY | Age: 77
DRG: 660 | End: 2022-12-05
Payer: MEDICARE

## 2022-12-05 ENCOUNTER — HOSPITAL ENCOUNTER (INPATIENT)
Age: 77
LOS: 7 days | Discharge: NURSING, REHAB OR LONG-TERM CARE | DRG: 660 | End: 2022-12-12
Attending: EMERGENCY MEDICINE | Admitting: FAMILY MEDICINE
Payer: MEDICARE

## 2022-12-05 ENCOUNTER — CARE COORDINATION (OUTPATIENT)
Dept: CARE COORDINATION | Facility: CLINIC | Age: 77
End: 2022-12-05

## 2022-12-05 DIAGNOSIS — R53.1 GENERALIZED WEAKNESS: Primary | ICD-10-CM

## 2022-12-05 DIAGNOSIS — N39.0 URINARY TRACT INFECTION ASSOCIATED WITH INDWELLING URETHRAL CATHETER, INITIAL ENCOUNTER (HCC): ICD-10-CM

## 2022-12-05 DIAGNOSIS — T83.511A URINARY TRACT INFECTION ASSOCIATED WITH INDWELLING URETHRAL CATHETER, INITIAL ENCOUNTER (HCC): ICD-10-CM

## 2022-12-05 LAB
ALBUMIN SERPL-MCNC: 3.7 G/DL (ref 3.2–4.6)
ALBUMIN/GLOB SERPL: 0.8 {RATIO} (ref 0.4–1.6)
ALP SERPL-CCNC: 90 U/L (ref 50–136)
ALT SERPL-CCNC: 30 U/L (ref 12–65)
ANION GAP SERPL CALC-SCNC: 7 MMOL/L (ref 2–11)
APPEARANCE UR: ABNORMAL
AST SERPL-CCNC: 37 U/L (ref 15–37)
BACTERIA URNS QL MICRO: ABNORMAL /HPF
BASOPHILS # BLD: 0 K/UL (ref 0–0.2)
BASOPHILS NFR BLD: 0 % (ref 0–2)
BILIRUB SERPL-MCNC: 0.5 MG/DL (ref 0.2–1.1)
BILIRUB UR QL: NEGATIVE
BUN SERPL-MCNC: 23 MG/DL (ref 8–23)
CALCIUM SERPL-MCNC: 9.2 MG/DL (ref 8.3–10.4)
CASTS URNS QL MICRO: ABNORMAL /LPF
CHLORIDE SERPL-SCNC: 105 MMOL/L (ref 101–110)
CO2 SERPL-SCNC: 22 MMOL/L (ref 21–32)
COLOR UR: ABNORMAL
CREAT SERPL-MCNC: 1.6 MG/DL (ref 0.8–1.5)
CRYSTALS URNS QL MICRO: 0 /LPF
DIFFERENTIAL METHOD BLD: ABNORMAL
EKG ATRIAL RATE: 72 BPM
EKG DIAGNOSIS: NORMAL
EKG P AXIS: -55 DEGREES
EKG P-R INTERVAL: 187 MS
EKG Q-T INTERVAL: 430 MS
EKG QRS DURATION: 115 MS
EKG QTC CALCULATION (BAZETT): 468 MS
EKG R AXIS: 92 DEGREES
EKG T AXIS: 76 DEGREES
EKG VENTRICULAR RATE: 71 BPM
EOSINOPHIL # BLD: 0 K/UL (ref 0–0.8)
EOSINOPHIL NFR BLD: 1 % (ref 0.5–7.8)
EPI CELLS #/AREA URNS HPF: 0 /HPF
ERYTHROCYTE [DISTWIDTH] IN BLOOD BY AUTOMATED COUNT: 14.9 % (ref 11.9–14.6)
GLOBULIN SER CALC-MCNC: 4.9 G/DL (ref 2.8–4.5)
GLUCOSE SERPL-MCNC: 84 MG/DL (ref 65–100)
GLUCOSE UR STRIP.AUTO-MCNC: NEGATIVE MG/DL
HCT VFR BLD AUTO: 41.2 % (ref 41.1–50.3)
HGB BLD-MCNC: 13.3 G/DL (ref 13.6–17.2)
HGB UR QL STRIP: ABNORMAL
IMM GRANULOCYTES # BLD AUTO: 0 K/UL (ref 0–0.5)
IMM GRANULOCYTES NFR BLD AUTO: 0 % (ref 0–5)
KETONES UR QL STRIP.AUTO: ABNORMAL MG/DL
LACTATE SERPL-SCNC: 1.2 MMOL/L (ref 0.4–2)
LEUKOCYTE ESTERASE UR QL STRIP.AUTO: ABNORMAL
LYMPHOCYTES # BLD: 0.4 K/UL (ref 0.5–4.6)
LYMPHOCYTES NFR BLD: 7 % (ref 13–44)
MAGNESIUM SERPL-MCNC: 1.9 MG/DL (ref 1.8–2.4)
MCH RBC QN AUTO: 29.6 PG (ref 26.1–32.9)
MCHC RBC AUTO-ENTMCNC: 32.3 G/DL (ref 31.4–35)
MCV RBC AUTO: 91.6 FL (ref 82–102)
MONOCYTES # BLD: 0.4 K/UL (ref 0.1–1.3)
MONOCYTES NFR BLD: 6 % (ref 4–12)
MUCOUS THREADS URNS QL MICRO: 0 /LPF
NEUTS SEG # BLD: 5 K/UL (ref 1.7–8.2)
NEUTS SEG NFR BLD: 85 % (ref 43–78)
NITRITE UR QL STRIP.AUTO: POSITIVE
NRBC # BLD: 0 K/UL (ref 0–0.2)
OTHER OBSERVATIONS: ABNORMAL
PH UR STRIP: 6 [PH] (ref 5–9)
PLATELET # BLD AUTO: 221 K/UL (ref 150–450)
PMV BLD AUTO: 9.4 FL (ref 9.4–12.3)
POTASSIUM SERPL-SCNC: 4.4 MMOL/L (ref 3.5–5.1)
PROT SERPL-MCNC: 8.6 G/DL (ref 6.3–8.2)
PROT UR STRIP-MCNC: 100 MG/DL
RBC # BLD AUTO: 4.5 M/UL (ref 4.23–5.6)
RBC #/AREA URNS HPF: 0 /HPF
SODIUM SERPL-SCNC: 134 MMOL/L (ref 133–143)
SP GR UR REFRACTOMETRY: 1.01 (ref 1–1.02)
TROPONIN I SERPL HS-MCNC: 42.1 PG/ML (ref 0–14)
TROPONIN I SERPL HS-MCNC: 47 PG/ML (ref 0–14)
URINE CULTURE IF INDICATED: ABNORMAL
UROBILINOGEN UR QL STRIP.AUTO: 0.2 EU/DL (ref 0.2–1)
WBC # BLD AUTO: 5.9 K/UL (ref 4.3–11.1)
WBC URNS QL MICRO: >100 /HPF

## 2022-12-05 PROCEDURE — 85025 COMPLETE CBC W/AUTO DIFF WBC: CPT

## 2022-12-05 PROCEDURE — 2580000003 HC RX 258: Performed by: FAMILY MEDICINE

## 2022-12-05 PROCEDURE — 74018 RADEX ABDOMEN 1 VIEW: CPT

## 2022-12-05 PROCEDURE — 83735 ASSAY OF MAGNESIUM: CPT

## 2022-12-05 PROCEDURE — 96365 THER/PROPH/DIAG IV INF INIT: CPT

## 2022-12-05 PROCEDURE — 71045 X-RAY EXAM CHEST 1 VIEW: CPT

## 2022-12-05 PROCEDURE — 84484 ASSAY OF TROPONIN QUANT: CPT

## 2022-12-05 PROCEDURE — 87186 SC STD MICRODIL/AGAR DIL: CPT

## 2022-12-05 PROCEDURE — 6370000000 HC RX 637 (ALT 250 FOR IP): Performed by: FAMILY MEDICINE

## 2022-12-05 PROCEDURE — 87040 BLOOD CULTURE FOR BACTERIA: CPT

## 2022-12-05 PROCEDURE — 87086 URINE CULTURE/COLONY COUNT: CPT

## 2022-12-05 PROCEDURE — 87088 URINE BACTERIA CULTURE: CPT

## 2022-12-05 PROCEDURE — 2580000003 HC RX 258: Performed by: EMERGENCY MEDICINE

## 2022-12-05 PROCEDURE — 6360000002 HC RX W HCPCS: Performed by: FAMILY MEDICINE

## 2022-12-05 PROCEDURE — 83605 ASSAY OF LACTIC ACID: CPT

## 2022-12-05 PROCEDURE — 1100000000 HC RM PRIVATE

## 2022-12-05 PROCEDURE — 81001 URINALYSIS AUTO W/SCOPE: CPT

## 2022-12-05 PROCEDURE — 6360000002 HC RX W HCPCS: Performed by: EMERGENCY MEDICINE

## 2022-12-05 PROCEDURE — 93005 ELECTROCARDIOGRAM TRACING: CPT | Performed by: EMERGENCY MEDICINE

## 2022-12-05 PROCEDURE — 99285 EMERGENCY DEPT VISIT HI MDM: CPT

## 2022-12-05 PROCEDURE — 80053 COMPREHEN METABOLIC PANEL: CPT

## 2022-12-05 RX ORDER — OLANZAPINE 2.5 MG/1
7.5 TABLET ORAL NIGHTLY
Status: DISCONTINUED | OUTPATIENT
Start: 2022-12-05 | End: 2022-12-10

## 2022-12-05 RX ORDER — ACETAMINOPHEN 650 MG/1
650 SUPPOSITORY RECTAL EVERY 6 HOURS PRN
Status: DISCONTINUED | OUTPATIENT
Start: 2022-12-05 | End: 2022-12-12 | Stop reason: HOSPADM

## 2022-12-05 RX ORDER — CARVEDILOL 3.12 MG/1
6.25 TABLET ORAL 2 TIMES DAILY WITH MEALS
Status: DISCONTINUED | OUTPATIENT
Start: 2022-12-05 | End: 2022-12-12 | Stop reason: HOSPADM

## 2022-12-05 RX ORDER — SODIUM CHLORIDE 0.9 % (FLUSH) 0.9 %
5-40 SYRINGE (ML) INJECTION PRN
Status: DISCONTINUED | OUTPATIENT
Start: 2022-12-05 | End: 2022-12-12 | Stop reason: HOSPADM

## 2022-12-05 RX ORDER — SODIUM CHLORIDE 9 MG/ML
INJECTION, SOLUTION INTRAVENOUS PRN
Status: DISCONTINUED | OUTPATIENT
Start: 2022-12-05 | End: 2022-12-12 | Stop reason: HOSPADM

## 2022-12-05 RX ORDER — POLYETHYLENE GLYCOL 3350 17 G/17G
17 POWDER, FOR SOLUTION ORAL DAILY PRN
Status: DISCONTINUED | OUTPATIENT
Start: 2022-12-05 | End: 2022-12-06 | Stop reason: SDUPTHER

## 2022-12-05 RX ORDER — 0.9 % SODIUM CHLORIDE 0.9 %
1000 INTRAVENOUS SOLUTION INTRAVENOUS ONCE
Status: COMPLETED | OUTPATIENT
Start: 2022-12-05 | End: 2022-12-05

## 2022-12-05 RX ORDER — CLONAZEPAM 1 MG/1
2 TABLET ORAL EVERY 8 HOURS PRN
Status: DISCONTINUED | OUTPATIENT
Start: 2022-12-05 | End: 2022-12-12 | Stop reason: HOSPADM

## 2022-12-05 RX ORDER — AMITRIPTYLINE HYDROCHLORIDE 50 MG/1
50 TABLET, FILM COATED ORAL NIGHTLY
Status: DISCONTINUED | OUTPATIENT
Start: 2022-12-05 | End: 2022-12-12 | Stop reason: HOSPADM

## 2022-12-05 RX ORDER — SODIUM CHLORIDE 0.9 % (FLUSH) 0.9 %
5-40 SYRINGE (ML) INJECTION EVERY 12 HOURS SCHEDULED
Status: DISCONTINUED | OUTPATIENT
Start: 2022-12-05 | End: 2022-12-12 | Stop reason: HOSPADM

## 2022-12-05 RX ORDER — ACETAMINOPHEN 325 MG/1
650 TABLET ORAL EVERY 6 HOURS PRN
Status: DISCONTINUED | OUTPATIENT
Start: 2022-12-05 | End: 2022-12-12 | Stop reason: HOSPADM

## 2022-12-05 RX ADMIN — SODIUM CHLORIDE 1000 ML: 900 INJECTION, SOLUTION INTRAVENOUS at 10:25

## 2022-12-05 RX ADMIN — CEFEPIME 2000 MG: 2 INJECTION, POWDER, FOR SOLUTION INTRAVENOUS at 16:46

## 2022-12-05 RX ADMIN — CARVEDILOL 6.25 MG: 6.25 TABLET, FILM COATED ORAL at 19:26

## 2022-12-05 RX ADMIN — CEFTRIAXONE 1000 MG: 1 INJECTION, POWDER, FOR SOLUTION INTRAMUSCULAR; INTRAVENOUS at 11:56

## 2022-12-05 RX ADMIN — VANCOMYCIN HYDROCHLORIDE 750 MG: 750 INJECTION, POWDER, LYOPHILIZED, FOR SOLUTION INTRAVENOUS at 16:44

## 2022-12-05 ASSESSMENT — PAIN - FUNCTIONAL ASSESSMENT: PAIN_FUNCTIONAL_ASSESSMENT: NONE - DENIES PAIN

## 2022-12-05 ASSESSMENT — ENCOUNTER SYMPTOMS
BACK PAIN: 0
DIARRHEA: 0
VOMITING: 0
SHORTNESS OF BREATH: 0
ABDOMINAL PAIN: 0
NAUSEA: 1
SORE THROAT: 0
COUGH: 0

## 2022-12-05 NOTE — ED TRIAGE NOTES
Pt arrives via EMS from home c/o generalized weakness and fatigue since Friday. Pt states unable to ambulate but ambulating with EMS. Pt presents with slurred speech but per pt speech is normal.    Cinn negative. 140/80. HR 80. 99% RA. Bgl 84.

## 2022-12-05 NOTE — CONSULTS
Consult noted. Pt here with \"uti\", has carr in, WBC normal.  He is scheduled for carr/stent exchange with Dr. Ivan Sheth this Thursday. I have notified Dr. Ivan Sheth, chart reviewed, would continue IV abx, follow up cultures, continue carr at this time. Surgery still on for Thursday. I have reviewed the above note. I agree with the HPI, exam, assessment and plan as outlined by the nurse practitioner. Jaime Sheth M.D.     [facility]  [address], [address] [facility]  Phone: [phone]  Fax: [phone]

## 2022-12-05 NOTE — ED PROVIDER NOTES
[facility] (formerly [facility]) Emergency Department Provider Note                   PCP:                Jonathan Lira DO               Age: 76 y.o. Sex: male       Juan Carpenter is a 76 y.o. male who presents to the Emergency Department with chief complaint of    Chief Complaint   Patient presents with    Fatigue      Patient was recently hospitalized for small bowel obstruction and sent to rehab facility. Patient was just released from rehab last week and states he was able to ambulate. He states about 3 days ago he started having generalized weakness and difficulty ambulating. Patient states that he has right leg weakness from a surgery where his muscle was removed. He always has slurred speech from prior medical condition. Patient states that he has been eating and drinking normally without any abdominal pain. He has had some nausea but no vomiting. He has had normal output from his colostomy. The history is provided by the patient and the EMS personnel.  used: Patient has severe dysarthria. All other systems reviewed and are negative. Review of Systems   Constitutional:  Positive for fatigue. Negative for chills and fever. HENT:  Negative for congestion and sore throat. Eyes:  Negative for visual disturbance. Respiratory:  Negative for cough and shortness of breath. Cardiovascular:  Negative for chest pain. Gastrointestinal:  Positive for nausea. Negative for abdominal pain, diarrhea and vomiting. Genitourinary:  Negative for dysuria. Musculoskeletal:  Negative for back pain and neck pain. Skin:  Negative for rash. Neurological:  Negative for dizziness and headaches.      Past Medical History:   Diagnosis Date    Acute deep vein thrombosis (DVT) of non-extremity vein 5/20/2019    Family history of malignant neoplasm of gastrointestinal tract     mother colon/ovarian cancer 67    Fecal incontinence     LAR syndrome    John catheter in place 2022    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    surgery and radiation    Psychiatric disorder     anxiety        Past Surgical History:   Procedure Laterality Date    COLONOSCOPY  last 2/3/15    Roberto--no polyps--3 year recall    COLONOSCOPY N/A 3/5/2021    COLONOSCOPY/BMI 19 performed by Pete Mistry MD at [facility] N/A 2/12/2018    COLONOSCOPY / BMI=21 performed by Allie Rain MD at [place] [id number]  2/12/2018         COLONOSCOPY THRU STOMA,LESN RMVL W/SNARE  3/5/2021         COLOSTOMY  5/11/16    CT RETROPERITONEAL PERC DRAIN  5/13/2021    CT RETROPERITONEAL PERC DRAIN 5/13/2021 [facility] CT SCAN    CT RETROPERITONEAL PERC DRAIN  5/20/2021    CT RETROPERITONEAL PERC DRAIN 5/20/2021 [facility] CT SCAN    GI  4/2016    Sacral nerve stimlator lead trial (unsucessful) and removal    HERNIA REPAIR      and Colostomy in May    HERNIA REPAIR  3/2015, 5/2016    IR ABSCESS EVAL THRU EXISTING CATH  11/24/2021    IR ABSCESS EVAL THRU EXISTING CATH  6/4/2021    IR ABSCESS EVAL THRU EXISTING CATH  5/20/2021    IR NEPHROSTOGRAM EXISTING ACCESS  12/10/2021    IR NEPHROSTOGRAM EXISTING ACCESS  10/4/2021    IR NEPHROSTOMY EXCHANGE CATHETER  12/7/2021    IR NEPHROSTOMY EXCHANGE CATHETER  11/3/2021    IR NEPHROSTOMY EXCHANGE CATHETER  10/28/2021    IR NEPHROSTOMY PERCUTANEOUS LEFT  9/16/2021    IR NEPHROSTOMY PERCUTANEOUS LEFT  9/16/2021    IR NEPHROSTOMY PERCUTANEOUS LEFT 9/16/2021 [facility]    IR REPLCE T CVC WO PORT SAME ACC  2019    IR TUBE CHANGE  2021    IR TUBE CHANGE  2021    IR TUBE CHANGE  2021    IR TUBE CHANGE  2021    IR TUNNELED CATHETER PLACEMENT GREATER THAN 5 YEARS  2019    IR TUNNELED CATHETER PLACEMENT GREATER THAN 5 YEARS  2019    IR TUNNELED CATHETER PLACEMENT GREATER THAN 5 YEARS 2019 [facility]    OTHER SURGICAL HISTORY Bilateral     cataracts      OTHER SURGICAL HISTORY  10/7/2013    Roberto---endorectal us    POLYPECTOMY      TOTAL COLECTOMY  2013    Roberto--lap LAR with coloproctostomy, mobilization splenic flexure, diverting loop ileostomy    TOTAL COLECTOMY Right 2014    for leak    VASCULAR SURGERY Left     single lumen port/subsequently removed        Family History   Problem Relation Age of Onset    Cancer Mother         colon and ovarian    Cancer Brother         prostate    Alcohol Abuse Brother     Cancer Maternal Grandmother         bone    Alcohol Abuse Father     Stroke Paternal Grandfather     Alcohol Abuse Sister         Social History     Socioeconomic History    Marital status: Single   Tobacco Use    Smoking status: Former     Packs/day: 1.00     Types: Cigarettes     Quit date: 1963     Years since quittin.0    Smokeless tobacco: Never   Substance and Sexual Activity    Alcohol use: Not Currently     Alcohol/week: 11.7 standard drinks    Drug use: No        Allergies: Patient has no known allergies.     Previous Medications    AMITRIPTYLINE (ELAVIL) 50 MG TABLET    Take 1 tablet by mouth nightly    CARVEDILOL (COREG) 6.25 MG TABLET    Take 1 tablet by mouth 2 times daily (with meals)    LEVOTHYROXINE (SYNTHROID) 125 MCG TABLET    TAKE ONE TABLET BY MOUTH ONE TIME DAILY BEFORE BREAKFAST    OLANZAPINE (ZYPREXA) 2.5 MG TABLET    Take 7.5 mg by mouth nightly    POLYETHYLENE GLYCOL (GLYCOLAX) 17 G PACKET    Take 17 g by mouth daily as needed for Constipation        Vitals signs and nursing note reviewed. Patient Vitals for the past 4 hrs:   Pulse Resp BP SpO2   12/05/22 1213 81 12 -- 98 %   12/05/22 1200 81 18 (!) 148/87 97 %   12/05/22 1140 65 15 (!) 144/76 99 %   12/05/22 1130 70 15 129/76 99 %   12/05/22 1110 68 14 (!) 142/77 98 %          Physical Exam  Vitals and nursing note reviewed. Constitutional:       Appearance: Normal appearance. HENT:      Head: Normocephalic and atraumatic. Cardiovascular:      Rate and Rhythm: Normal rate and regular rhythm. Pulses: Normal pulses. Heart sounds: Normal heart sounds. Pulmonary:      Effort: Pulmonary effort is normal.      Breath sounds: Normal breath sounds. Abdominal:      General: Abdomen is flat. Bowel sounds are normal.      Palpations: Abdomen is soft. Tenderness: There is no abdominal tenderness. Comments: Ostomy in the left lower quadrant is soft and nontender   Musculoskeletal:         General: No swelling or tenderness. Cervical back: Normal range of motion and neck supple. No tenderness. Skin:     General: Skin is warm and dry. Neurological:      Mental Status: He is alert. Cranial Nerves: Dysarthria present. Comments: Strength of 4 out of 5 to right arm, left arm, and left leg. Strength of 3 out of 5 to right leg.         Orders Placed This Encounter   Procedures    Culture, Blood 1    Culture, Urine    XR CHEST PORTABLE    XR ABDOMEN (KUB) (SINGLE AP VIEW)    CBC with Auto Differential    Comprehensive Metabolic Panel    Lactate, Sepsis    Urinalysis with Reflex to Culture    Magnesium    Troponin    Troponin    Cardiac Monitor - ED Only    Continuous Pulse Oximetry    EKG 12 Lead    Saline lock IV       Procedures    ED EKG Interpretation  EKG was interpreted in the absence of a cardiologist.    Rate: Rate: Normal  EKG Interpretation: EKG Interpretation: sinus rhythm  ST Segments: Nonspecific ST segments - NO STEMI  Incomplete right bundle branch block    Results Include:    Recent Results (from the past 24 hour(s))   EKG 12 Lead    Collection Time: 12/05/22  8:43 AM   Result Value Ref Range    Ventricular Rate 71 BPM    Atrial Rate 72 BPM    P-R Interval 187 ms    QRS Duration 115 ms    Q-T Interval 430 ms    QTc Calculation (Bazett) 468 ms    P Axis -55 degrees    R Axis 92 degrees    T Axis 76 degrees    Diagnosis Sinus or ectopic atrial rhythm    CBC with Auto Differential    Collection Time: 12/05/22  9:35 AM   Result Value Ref Range    WBC 5.9 4.3 - 11.1 K/uL    RBC 4.50 4.23 - 5.6 M/uL    Hemoglobin 13.3 (L) 13.6 - 17.2 g/dL    Hematocrit 41.2 41.1 - 50.3 %    MCV 91.6 82 - 102 FL    MCH 29.6 26.1 - 32.9 PG    MCHC 32.3 31.4 - 35.0 g/dL    RDW 14.9 (H) 11.9 - 14.6 %    Platelets 410 520 - 296 K/uL    MPV 9.4 9.4 - 12.3 FL    nRBC 0.00 0.0 - 0.2 K/uL    Differential Type AUTOMATED      Seg Neutrophils 85 (H) 43 - 78 %    Lymphocytes 7 (L) 13 - 44 %    Monocytes 6 4.0 - 12.0 %    Eosinophils % 1 0.5 - 7.8 %    Basophils 0 0.0 - 2.0 %    Immature Granulocytes 0 0.0 - 5.0 %    Segs Absolute 5.0 1.7 - 8.2 K/UL    Absolute Lymph # 0.4 (L) 0.5 - 4.6 K/UL    Absolute Mono # 0.4 0.1 - 1.3 K/UL    Absolute Eos # 0.0 0.0 - 0.8 K/UL    Basophils Absolute 0.0 0.0 - 0.2 K/UL    Absolute Immature Granulocyte 0.0 0.0 - 0.5 K/UL   Comprehensive Metabolic Panel    Collection Time: 12/05/22  9:35 AM   Result Value Ref Range    Sodium 134 133 - 143 mmol/L    Potassium 4.4 3.5 - 5.1 mmol/L    Chloride 105 101 - 110 mmol/L    CO2 22 21 - 32 mmol/L    Anion Gap 7 2 - 11 mmol/L    Glucose 84 65 - 100 mg/dL    BUN 23 8 - 23 MG/DL    Creatinine 1.60 (H) 0.8 - 1.5 MG/DL    Est, Glom Filt Rate 44 (L) >60 ml/min/1.73m2    Calcium 9.2 8.3 - 10.4 MG/DL    Total Bilirubin 0.5 0.2 - 1.1 MG/DL    ALT 30 12 - 65 U/L    AST 37 15 - 37 U/L    Alk Phosphatase 90 50 - 136 U/L    Total Protein 8.6 (H) 6.3 - 8.2 g/dL    Albumin 3.7 3.2 - 4.6 g/dL    Globulin 4.9 (H) 2.8 - 4.5 g/dL    Albumin/Globulin Ratio 0.8 0.4 - 1.6 Magnesium    Collection Time: 12/05/22  9:35 AM   Result Value Ref Range    Magnesium 1.9 1.8 - 2.4 mg/dL   Troponin    Collection Time: 12/05/22  9:35 AM   Result Value Ref Range    Troponin, High Sensitivity 47.0 (H) 0 - 14 pg/mL   Lactate, Sepsis    Collection Time: 12/05/22 10:21 AM   Result Value Ref Range    Lactic Acid, Sepsis 1.2 0.4 - 2.0 MMOL/L   Urinalysis with Reflex to Culture    Collection Time: 12/05/22 10:21 AM    Specimen: Urine   Result Value Ref Range    Color, UA YELLOW/STRAW      Appearance TURBID      Specific Gravity, UA 1.007 1.001 - 1.023      pH, Urine 6.0 5.0 - 9.0      Protein,  (A) NEG mg/dL    Glucose, UA Negative mg/dL    Ketones, Urine TRACE (A) NEG mg/dL    Bilirubin Urine Negative NEG      Blood, Urine MODERATE (A) NEG      Urobilinogen, Urine 0.2 0.2 - 1.0 EU/dL    Nitrite, Urine Positive (A) NEG      Leukocyte Esterase, Urine LARGE (A) NEG      WBC, UA >100 0 /hpf    RBC, UA 0 0 /hpf    BACTERIA, URINE 4+ (H) 0 /hpf    Urine Culture if Indicated URINE CULTURE ORDERED      Epithelial Cells UA 0 0 /hpf    Casts 3-5 0 /lpf    Crystals 0 0 /LPF    Mucus, UA 0 0 /lpf    Other observations RESULTS VERIFIED MANUALLY     Troponin    Collection Time: 12/05/22 11:57 AM   Result Value Ref Range    Troponin, High Sensitivity 42.1 (H) 0 - 14 pg/mL          XR CHEST PORTABLE   Final Result   1. No acute abnormality in the chest.   2. No acute abdominal or pelvic abnormality. Interval decrease in the small   bowel dilation. XR ABDOMEN (KUB) (SINGLE AP VIEW)   Final Result   1. No acute abnormality in the chest.   2. No acute abdominal or pelvic abnormality. Interval decrease in the small   bowel dilation. ED Course as of 12/05/22 1502   Mon Dec 05, 2022   1201 Patient resting comfortably in bed. He was updated on the results so far.   [CW]   1401 Nurse reports the patient was unable to ambulate. [CW]      ED Course User Index  [CW] Yo Underwood MD MDM  Number of Diagnoses or Management Options  Generalized weakness  Urinary tract infection associated with indwelling urethral catheter, initial encounter [facility])  Diagnosis management comments: Patient with a history of rectal cancer and ostomy who was recently discharged from the hospital for bowel obstruction. Patient went to rehab but then went home. Patient has been complaining of generalized weakness for the past few days and unable to ambulate. Patient's urinalysis is consistent with a UTI. Patient's abdominal x-ray showed no bowel obstruction. Patient's creatinine has increased a little and is 1.6. His lactate is normal at 1.2. We gave IV fluid and patient is still unable to ambulate. Hospitalist was consulted for admission. Explanation: The diagnosis and plan as well as the results of any testing and treatments today in the department were communicated to the patient and/or their family/caregiver (if present). The patient/caregiver verbalized understanding and realize that they are being admitted to the hospital for further evaluation and treatment. All questions were answered at bedside. ICD-10-CM    1. Generalized weakness  R53.1       2. Urinary tract infection associated with indwelling urethral catheter, initial encounter [facility])  T83.511A     N39.0           DISPOSITION Decision To Admit 12/05/2022 02:38:31 PM       Voice dictation software was used during the making of this note. This software is not perfect and grammatical and other typographical errors may be present. This note has not been completely proofread for errors.      Lou Ambrose MD  12/05/22 [id number]

## 2022-12-05 NOTE — H&P
Hospitalist Admission History and Physical         NAME:            Estelle Nolasco    Age:                76 y.o.    :               1945    MRN:              [id number]    PCP: Dulce Skelton DO    Consulting MD:    Treatment Team: Attending Provider: Alice Eaton DO; Registered Nurse: Hunter Langley RN; Consulting Provider: Theron Chew MD         Chief Complaint   Patient presents with    Fatigue   HPI:    Patient is a 76 y.o. male who presented to the ED for cc generalized weakness since this past Friday. Nothing seems to make better or worse. Denies difficulty with vision or lid lag. H/O rectal CA with colostomy no longer on treatment, urostomies related to radiation, due for right stent exchange this month, hypothyroidism, chronic slurred speech, admission from 10/16-10/27 for lower extremity weakness receiving IVIG (But I cannot find diagnosis as to what they thought were going on), multiple abdominal surgeries, and recent discharge from our facility on  for SBO.      Past Medical History:   Diagnosis Date    Acute deep vein thrombosis (DVT) of non-extremity vein 2019    Family history of malignant neoplasm of gastrointestinal tract     mother colon/ovarian cancer 67    Fecal incontinence     LAR syndrome    John catheter in place     patient stated- \"catheter due to them nicking his bladder and it won't heal\"    Former cigarette smoker     History of rectal cancer     Hypothyroid     stable w/med    Infection and inflammatory reaction due to other internal prosthetic devices, implants and grafts, subsequent encounter     abd wound/mesh colostomy    Insomnia     takes meds    Major depression     Osteoarthritis, hand     Personal history of colonic polyps 9/2013    x 1    Personal history of malignant neoplasm of rectum, rectosigmoid junction, and anus 2013    surgery and radiation    Psychiatric disorder     anxiety            Past Surgical History: Procedure Laterality Date    COLONOSCOPY  last 2/3/15    Roberto--no polyps--3 year recall    COLONOSCOPY N/A 3/5/2021    COLONOSCOPY/BMI 19 performed by Lorraine Disla MD at [facility] ENDOSCOPY    COLONOSCOPY N/A 2/12/2018    COLONOSCOPY / BMI=21 performed by Claudio Mcclure MD at [place] [address]  2/12/2018         COLONOSCOPY THRU STOMA,LESN RMVL W/SNARE  3/5/2021         COLOSTOMY  5/11/16    CT RETROPERITONEAL PERC DRAIN  5/13/2021    CT RETROPERITONEAL PERC DRAIN 5/13/2021 [facility] CT SCAN    CT RETROPERITONEAL PERC DRAIN  5/20/2021    CT RETROPERITONEAL PERC DRAIN 5/20/2021 [facility] CT SCAN    GI  4/2016    Sacral nerve stimlator lead trial (unsucessful) and removal    HERNIA REPAIR      and Colostomy in May    HERNIA REPAIR  3/2015, 5/2016    IR ABSCESS EVAL THRU EXISTING CATH  11/24/2021    IR ABSCESS EVAL THRU EXISTING CATH  6/4/2021    IR ABSCESS EVAL THRU EXISTING CATH  5/20/2021    IR NEPHROSTOGRAM EXISTING ACCESS  12/10/2021    IR NEPHROSTOGRAM EXISTING ACCESS  10/4/2021    IR NEPHROSTOMY EXCHANGE CATHETER  12/7/2021    IR NEPHROSTOMY EXCHANGE CATHETER  11/3/2021    IR NEPHROSTOMY EXCHANGE CATHETER  10/28/2021    IR NEPHROSTOMY PERCUTANEOUS LEFT  9/16/2021    IR NEPHROSTOMY PERCUTANEOUS LEFT  9/16/2021    IR NEPHROSTOMY PERCUTANEOUS LEFT 9/16/2021 [facility]    IR REPLCE T CVC WO PORT SAME ACC  5/30/2019    IR TUBE CHANGE  8/5/2021    IR TUBE CHANGE  7/8/2021    IR TUBE CHANGE  6/14/2021    IR TUBE CHANGE  5/27/2021    IR TUNNELED CATHETER PLACEMENT GREATER THAN 5 YEARS  5/20/2019    IR TUNNELED CATHETER PLACEMENT GREATER THAN 5 YEARS  5/20/2019    IR TUNNELED CATHETER PLACEMENT GREATER THAN 5 YEARS 5/20/2019 [facility] SPECI[facility]    OTHER SURGICAL HISTORY Bilateral     cataracts  2017    OTHER SURGICAL HISTORY  10/7/2013    Roberto---endorectal us    POLYPECTOMY      TOTAL COLECTOMY  11/2013    Roberto--lap LAR with coloproctostomy, mobilization splenic flexure, diverting loop ileostomy    TOTAL COLECTOMY Right 2014    for leak    VASCULAR SURGERY Left     single lumen port/subsequently removed            Family History   Problem Relation Age of Onset    Cancer Mother         colon and ovarian    Cancer Brother         prostate    Alcohol Abuse Brother     Cancer Maternal Grandmother         bone    Alcohol Abuse Father     Stroke Paternal Grandfather     Alcohol Abuse Sister        Family history reviewed and negative except as noted above. Social History     Social History Narrative    Not on file            Social History     Tobacco Use    Smoking status: Former     Packs/day: 1.00     Types: Cigarettes     Quit date: 1963     Years since quittin.0    Smokeless tobacco: Never   Substance Use Topics    Alcohol use: Not Currently     Alcohol/week: 11.7 standard drinks            Social History     Substance and Sexual Activity   Drug Use No                 No Known Allergies         Prior to Admission medications    Medication Sig Start Date End Date Taking?  Authorizing Provider   polyethylene glycol (GLYCOLAX) 17 g packet Take 17 g by mouth daily as needed for Constipation 22  Gretchen Masters,    amitriptyline (ELAVIL) 50 MG tablet Take 1 tablet by mouth nightly 10/23/22   Susannah Still MD   carvedilol (COREG) 6.25 MG tablet Take 1 tablet by mouth 2 times daily (with meals) 22   Aure Mathur DO   levothyroxine (SYNTHROID) 125 MCG tablet TAKE ONE TABLET BY MOUTH ONE TIME DAILY BEFORE BREAKFAST 12/10/21   Ar Automatic Reconciliation   OLANZapine (ZYPREXA) 2.5 MG tablet Take 7.5 mg by mouth nightly    Ar Automatic Reconciliation                      Review of Systems         Constitutional: NAD    Eyes:  no change in visual acuity, no photophobia    Ears, nose, mouth, throat, and face: no  Odynphagia, dysphagia, no thrush or exudate, negative for chronic sinus congestion, recurrent headaches    Respiratory: negative for SOB, hemoptysis or cough    Cardiovascular: negative for CP, palpitations, or PND    Gastrointestinal: negative for abdominal pain, no hematemesis, hematochezia or BRBPR    Genitourinary: no urgency, frequency, or dysuria, no nocturia. States he is due to stent exchange and carr exchange     Integument/breast: negative for skin rash or skin lesions    Hematologic/lymphatic: negative for known bleeding disorder    Musculoskeletal:leg weakness    Neurological: generalized weakness especially in his legs. Behavioral/Psych: negative for depression or chronic anxiety,    Endocrine: negative for polydyspia, polyuria or intolerance to heat or cold    Allergic/Immunologic: negative for chronic allergic rhinitis, or known connective tissue disorder              Objective:         Patient Vitals for the past 24 hrs:   Temp Pulse Resp BP SpO2   12/05/22 1213 -- 81 12 -- 98 %   12/05/22 1200 -- 81 18 (!) 148/87 97 %   12/05/22 1140 -- 65 15 (!) 144/76 99 %   12/05/22 1130 -- 70 15 129/76 99 %   12/05/22 1110 -- 68 14 (!) 142/77 98 %   12/05/22 0839 97.6 °F (36.4 °C) 82 17 (!) 148/94 97 %            No intake/output data recorded. No intake/output data recorded.          Data Review:   Recent Results (from the past 24 hour(s))   EKG 12 Lead    Collection Time: 12/05/22  8:43 AM   Result Value Ref Range    Ventricular Rate 71 BPM    Atrial Rate 72 BPM    P-R Interval 187 ms    QRS Duration 115 ms    Q-T Interval 430 ms    QTc Calculation (Bazett) 468 ms    P Axis -55 degrees    R Axis 92 degrees    T Axis 76 degrees    Diagnosis Sinus or ectopic atrial rhythm    CBC with Auto Differential    Collection Time: 12/05/22  9:35 AM   Result Value Ref Range    WBC 5.9 4.3 - 11.1 K/uL    RBC 4.50 4.23 - 5.6 M/uL    Hemoglobin 13.3 (L) 13.6 - 17.2 g/dL    Hematocrit 41.2 41.1 - 50.3 %    MCV 91.6 82 - 102 FL    MCH 29.6 26.1 - 32.9 PG    MCHC 32.3 31.4 - 35.0 g/dL    RDW 14.9 (H) 11.9 - 14.6 % Platelets 700 983 - 854 K/uL    MPV 9.4 9.4 - 12.3 FL    nRBC 0.00 0.0 - 0.2 K/uL    Differential Type AUTOMATED      Seg Neutrophils 85 (H) 43 - 78 %    Lymphocytes 7 (L) 13 - 44 %    Monocytes 6 4.0 - 12.0 %    Eosinophils % 1 0.5 - 7.8 %    Basophils 0 0.0 - 2.0 %    Immature Granulocytes 0 0.0 - 5.0 %    Segs Absolute 5.0 1.7 - 8.2 K/UL    Absolute Lymph # 0.4 (L) 0.5 - 4.6 K/UL    Absolute Mono # 0.4 0.1 - 1.3 K/UL    Absolute Eos # 0.0 0.0 - 0.8 K/UL    Basophils Absolute 0.0 0.0 - 0.2 K/UL    Absolute Immature Granulocyte 0.0 0.0 - 0.5 K/UL   Comprehensive Metabolic Panel    Collection Time: 12/05/22  9:35 AM   Result Value Ref Range    Sodium 134 133 - 143 mmol/L    Potassium 4.4 3.5 - 5.1 mmol/L    Chloride 105 101 - 110 mmol/L    CO2 22 21 - 32 mmol/L    Anion Gap 7 2 - 11 mmol/L    Glucose 84 65 - 100 mg/dL    BUN 23 8 - 23 MG/DL    Creatinine 1.60 (H) 0.8 - 1.5 MG/DL    Est, Glom Filt Rate 44 (L) >60 ml/min/1.73m2    Calcium 9.2 8.3 - 10.4 MG/DL    Total Bilirubin 0.5 0.2 - 1.1 MG/DL    ALT 30 12 - 65 U/L    AST 37 15 - 37 U/L    Alk Phosphatase 90 50 - 136 U/L    Total Protein 8.6 (H) 6.3 - 8.2 g/dL    Albumin 3.7 3.2 - 4.6 g/dL    Globulin 4.9 (H) 2.8 - 4.5 g/dL    Albumin/Globulin Ratio 0.8 0.4 - 1.6     Magnesium    Collection Time: 12/05/22  9:35 AM   Result Value Ref Range    Magnesium 1.9 1.8 - 2.4 mg/dL   Troponin    Collection Time: 12/05/22  9:35 AM   Result Value Ref Range    Troponin, High Sensitivity 47.0 (H) 0 - 14 pg/mL   Lactate, Sepsis    Collection Time: 12/05/22 10:21 AM   Result Value Ref Range    Lactic Acid, Sepsis 1.2 0.4 - 2.0 MMOL/L   Urinalysis with Reflex to Culture    Collection Time: 12/05/22 10:21 AM    Specimen: Urine   Result Value Ref Range    Color, UA YELLOW/STRAW      Appearance TURBID      Specific Gravity, UA 1.007 1.001 - 1.023      pH, Urine 6.0 5.0 - 9.0      Protein,  (A) NEG mg/dL    Glucose, UA Negative mg/dL    Ketones, Urine TRACE (A) NEG mg/dL Bilirubin Urine Negative NEG      Blood, Urine MODERATE (A) NEG      Urobilinogen, Urine 0.2 0.2 - 1.0 EU/dL    Nitrite, Urine Positive (A) NEG      Leukocyte Esterase, Urine LARGE (A) NEG      WBC, UA >100 0 /hpf    RBC, UA 0 0 /hpf    BACTERIA, URINE 4+ (H) 0 /hpf    Urine Culture if Indicated URINE CULTURE ORDERED      Epithelial Cells UA 0 0 /hpf    Casts 3-5 0 /lpf    Crystals 0 0 /LPF    Mucus, UA 0 0 /lpf    Other observations RESULTS VERIFIED MANUALLY     Troponin    Collection Time: 12/05/22 11:57 AM   Result Value Ref Range    Troponin, High Sensitivity 42.1 (H) 0 - 14 pg/mL            Physical Exam:         General:    Alert, cooperative, no distress, frail appearing with muscle wasting     Eyes:    Conjunctivae/corneas clear. PERRL. No lid lag. Ears:    Normal     Nose:    Nares normal.     Mouth/Throat:    Lips, mucosa, and tongue normal. Teeth and gums normal.    Neck:     no JVD. Back:     deferred    Lungs:     Clear to auscultation bilaterally. Heart:    Regular rate and rhythm, S1, S2 normal    Abdomen:     Soft, non-tender. Bowel sounds normal. Colostomy bag currently that is empty    Extremities:    Muscle wasting, no obvious deficit during movement but he is slow to move    Skin:    Skin color, texture, turgor normal. No rashes or lesions    Neurologic:    Chronic slurred speech, no obvious confusion.  - urostomy bag with good urine output. Assessment and Plan         Principal Problem:    Complicated UTI (urinary tract infection)  Active Problems:    Mild episode of recurrent major depressive disorder (HCC)    Leg weakness, bilateral    Rectal cancer ([facility])  Resolved Problems:    * No resolved hospital problems. *    Complicated UTI - Urine culture. Zosyn and Vancomycin. Has grown pseudomonas aeruginosa and e faecalis sensitive to vancomycin. Blood cultures ordered    Generalized weakness - I am suspecting due to his UTI.  No obvious neuro deficit that is focal acutely on exam. IF weakness still persistent despite UTI treatment, may need to consider neuro consult since did receive IVIG two months ago. Again, I am unsure why he received IVIG and patient is a poor historian. Will order neuro checks. Hx of colon cancer - With colostomy with good output    Hx of carr, urostomies, and right utereteral stent - Due for exchange. I have consulted urology and appreciate their help. Anxiety - PRN klonopin 2mg q 8hr, I have confirmed this dose with pharmacy.      Chronic pain - elavil    HTN - BB    Depression - holding Zyprexa due to prolonged QTC    Prolonged QTC - Place on remote tele     DVT prophylaxis - SCDs  Signed By:    Goran Friend DO    December 5, 2022

## 2022-12-05 NOTE — PROGRESS NOTES
VANCO DAILY FOLLOW UP NOTE  [facility] Pharmacokinetic Monitoring Service - Vancomycin    Consulting Provider: Dr. Mark Dallas   Indication: UTI  Target Concentration: Goal trough of 10-15 mg/L and AUC/DANA <500 mg*hr/L  Day of Therapy: 1 of 7  Additional Antimicrobials: cefepime    Patient eligible for piperacillin-tazobactam to cefepime auto-substitution per P&T approved protocol? YES    Pertinent Laboratory Values: Wt Readings from Last 1 Encounters:   12/05/22 115 lb (52.2 kg)     Temp Readings from Last 1 Encounters:   12/05/22 97.6 °F (36.4 °C) (Axillary)     Recent Labs     12/05/22  0935   BUN 23   CREATININE 1.60*   WBC 5.9     Estimated Creatinine Clearance: 28 mL/min (A) (based on SCr of 1.6 mg/dL (H)). No results found for: Lord Russell    MRSA Nasal Swab: N/A. Non-respiratory infection.     Assessment:  Date/Time Dose Concentration AUC         Note: Serum concentrations collected for AUC dosing may appear elevated if collected in close proximity to the dose administered, this is not necessarily an indication of toxicity    Plan:  Dosing recommendations based on Bayesian software  Start vancomycin 750 mg q24h  Anticipated AUC of 466 and trough concentration of 15.1 at steady state  Renal labs as indicated   Vancomycin concentrations will be ordered as clinically appropriate   Pharmacy will continue to monitor patient and adjust therapy as indicated    Thank you for the consult,  Jemma Orosco [facility]

## 2022-12-05 NOTE — ACP (ADVANCE CARE PLANNING)
[employer] Hospitalist Service  At the heart of better care     Advance Care Planning   Admit Date:  2022  8:32 AM   Name:  Victorina Nolasco   Age:  76 y.o. Sex:  male  :  1945   MRN:  [id number]   Room:  [address] [place] [address] Sw is able to make his own decisions:   Yes    Patient / surrogate decision-maker directed code status:  FULL    Patient or surrogate consented to discussion of the current conditions, workup, management plans, prognosis, and the risk for further deterioration. Time spent: 17 minutes in direct discussion.       Signed:  Johnny Meza DO

## 2022-12-06 PROBLEM — Z97.8 CHRONIC INDWELLING FOLEY CATHETER: Chronic | Status: ACTIVE | Noted: 2019-05-19

## 2022-12-06 PROBLEM — Z43.3 COLOSTOMY CARE (HCC): Chronic | Status: ACTIVE | Noted: 2019-05-19

## 2022-12-06 PROBLEM — R78.81 GRAM-NEGATIVE BACTEREMIA: Status: RESOLVED | Noted: 2022-06-22 | Resolved: 2022-12-06

## 2022-12-06 PROBLEM — K56.609 SBO (SMALL BOWEL OBSTRUCTION) (HCC): Status: RESOLVED | Noted: 2019-03-22 | Resolved: 2022-12-06

## 2022-12-06 PROBLEM — T83.511A URINARY TRACT INFECTION ASSOCIATED WITH INDWELLING URETHRAL CATHETER (HCC): Status: ACTIVE | Noted: 2022-12-06

## 2022-12-06 PROBLEM — K56.609 SMALL BOWEL OBSTRUCTION (HCC): Status: RESOLVED | Noted: 2019-03-30 | Resolved: 2022-12-06

## 2022-12-06 PROBLEM — R79.89 ELEVATED TROPONIN: Status: RESOLVED | Noted: 2022-11-16 | Resolved: 2022-12-06

## 2022-12-06 PROBLEM — F33.0 MILD EPISODE OF RECURRENT MAJOR DEPRESSIVE DISORDER (HCC): Chronic | Status: ACTIVE | Noted: 2022-06-21

## 2022-12-06 PROBLEM — R33.8 ACUTE URINARY RETENTION: Status: RESOLVED | Noted: 2022-07-07 | Resolved: 2022-12-06

## 2022-12-06 PROBLEM — R77.8 ELEVATED TROPONIN: Status: RESOLVED | Noted: 2022-11-16 | Resolved: 2022-12-06

## 2022-12-06 PROBLEM — K83.8 DILATED INTRAHEPATIC BILE DUCT: Status: RESOLVED | Noted: 2021-05-11 | Resolved: 2022-12-06

## 2022-12-06 PROBLEM — O22.30 DVT (DEEP VEIN THROMBOSIS) IN PREGNANCY: Status: RESOLVED | Noted: 2019-05-20 | Resolved: 2022-12-06

## 2022-12-06 PROBLEM — A41.9 SEPSIS WITH ACUTE ORGAN DYSFUNCTION (HCC): Status: RESOLVED | Noted: 2022-06-21 | Resolved: 2022-12-06

## 2022-12-06 PROBLEM — R55 SYNCOPE: Status: RESOLVED | Noted: 2022-11-15 | Resolved: 2022-12-06

## 2022-12-06 PROBLEM — N39.0 URINARY TRACT INFECTION ASSOCIATED WITH INDWELLING URETHRAL CATHETER (HCC): Status: ACTIVE | Noted: 2022-12-06

## 2022-12-06 PROBLEM — R78.81 BACTEREMIA DUE TO GRAM-POSITIVE BACTERIA: Status: RESOLVED | Noted: 2022-06-22 | Resolved: 2022-12-06

## 2022-12-06 PROBLEM — R65.20 SEPSIS WITH ACUTE ORGAN DYSFUNCTION (HCC): Status: RESOLVED | Noted: 2022-06-21 | Resolved: 2022-12-06

## 2022-12-06 PROBLEM — K94.00 COLOSTOMY COMPLICATION, UNSPECIFIED (HCC): Status: RESOLVED | Noted: 2022-11-14 | Resolved: 2022-12-06

## 2022-12-06 LAB
ANION GAP SERPL CALC-SCNC: 8 MMOL/L (ref 2–11)
BASOPHILS # BLD: 0 K/UL (ref 0–0.2)
BASOPHILS NFR BLD: 0 % (ref 0–2)
BUN SERPL-MCNC: 21 MG/DL (ref 8–23)
CALCIUM SERPL-MCNC: 8.4 MG/DL (ref 8.3–10.4)
CHLORIDE SERPL-SCNC: 111 MMOL/L (ref 101–110)
CO2 SERPL-SCNC: 22 MMOL/L (ref 21–32)
CREAT SERPL-MCNC: 1.3 MG/DL (ref 0.8–1.5)
DIFFERENTIAL METHOD BLD: ABNORMAL
EOSINOPHIL # BLD: 0 K/UL (ref 0–0.8)
EOSINOPHIL NFR BLD: 0 % (ref 0.5–7.8)
ERYTHROCYTE [DISTWIDTH] IN BLOOD BY AUTOMATED COUNT: 14.7 % (ref 11.9–14.6)
GLUCOSE SERPL-MCNC: 73 MG/DL (ref 65–100)
HCT VFR BLD AUTO: 39 % (ref 41.1–50.3)
HGB BLD-MCNC: 12.4 G/DL (ref 13.6–17.2)
IMM GRANULOCYTES # BLD AUTO: 0 K/UL (ref 0–0.5)
IMM GRANULOCYTES NFR BLD AUTO: 0 % (ref 0–5)
LYMPHOCYTES # BLD: 0.4 K/UL (ref 0.5–4.6)
LYMPHOCYTES NFR BLD: 5 % (ref 13–44)
MCH RBC QN AUTO: 28.6 PG (ref 26.1–32.9)
MCHC RBC AUTO-ENTMCNC: 31.8 G/DL (ref 31.4–35)
MCV RBC AUTO: 89.9 FL (ref 82–102)
MONOCYTES # BLD: 0.7 K/UL (ref 0.1–1.3)
MONOCYTES NFR BLD: 9 % (ref 4–12)
NEUTS SEG # BLD: 6.7 K/UL (ref 1.7–8.2)
NEUTS SEG NFR BLD: 86 % (ref 43–78)
NRBC # BLD: 0 K/UL (ref 0–0.2)
PLATELET # BLD AUTO: 208 K/UL (ref 150–450)
PMV BLD AUTO: 10.4 FL (ref 9.4–12.3)
POTASSIUM SERPL-SCNC: 3.7 MMOL/L (ref 3.5–5.1)
RBC # BLD AUTO: 4.34 M/UL (ref 4.23–5.6)
SODIUM SERPL-SCNC: 141 MMOL/L (ref 133–143)
WBC # BLD AUTO: 7.7 K/UL (ref 4.3–11.1)

## 2022-12-06 PROCEDURE — 2580000003 HC RX 258: Performed by: INTERNAL MEDICINE

## 2022-12-06 PROCEDURE — 97112 NEUROMUSCULAR REEDUCATION: CPT

## 2022-12-06 PROCEDURE — 97535 SELF CARE MNGMENT TRAINING: CPT

## 2022-12-06 PROCEDURE — 6370000000 HC RX 637 (ALT 250 FOR IP): Performed by: FAMILY MEDICINE

## 2022-12-06 PROCEDURE — 85025 COMPLETE CBC W/AUTO DIFF WBC: CPT

## 2022-12-06 PROCEDURE — 99232 SBSQ HOSP IP/OBS MODERATE 35: CPT | Performed by: UROLOGY

## 2022-12-06 PROCEDURE — 6360000002 HC RX W HCPCS: Performed by: INTERNAL MEDICINE

## 2022-12-06 PROCEDURE — 2580000003 HC RX 258: Performed by: FAMILY MEDICINE

## 2022-12-06 PROCEDURE — 87040 BLOOD CULTURE FOR BACTERIA: CPT

## 2022-12-06 PROCEDURE — 80048 BASIC METABOLIC PNL TOTAL CA: CPT

## 2022-12-06 PROCEDURE — 6370000000 HC RX 637 (ALT 250 FOR IP): Performed by: INTERNAL MEDICINE

## 2022-12-06 PROCEDURE — 97161 PT EVAL LOW COMPLEX 20 MIN: CPT

## 2022-12-06 PROCEDURE — 51702 INSERT TEMP BLADDER CATH: CPT

## 2022-12-06 PROCEDURE — 97166 OT EVAL MOD COMPLEX 45 MIN: CPT

## 2022-12-06 PROCEDURE — 36415 COLL VENOUS BLD VENIPUNCTURE: CPT

## 2022-12-06 PROCEDURE — 1100000000 HC RM PRIVATE

## 2022-12-06 RX ORDER — POLYETHYLENE GLYCOL 3350 17 G/17G
17 POWDER, FOR SOLUTION ORAL DAILY PRN
Status: DISCONTINUED | OUTPATIENT
Start: 2022-12-06 | End: 2022-12-12 | Stop reason: HOSPADM

## 2022-12-06 RX ORDER — LIDOCAINE HYDROCHLORIDE 20 MG/ML
JELLY TOPICAL PRN
Status: DISCONTINUED | OUTPATIENT
Start: 2022-12-06 | End: 2022-12-12 | Stop reason: HOSPADM

## 2022-12-06 RX ORDER — ENOXAPARIN SODIUM 100 MG/ML
40 INJECTION SUBCUTANEOUS EVERY 24 HOURS
Status: DISCONTINUED | OUTPATIENT
Start: 2022-12-06 | End: 2022-12-12

## 2022-12-06 RX ORDER — OLANZAPINE 2.5 MG/1
7.5 TABLET ORAL NIGHTLY
Status: DISCONTINUED | OUTPATIENT
Start: 2022-12-06 | End: 2022-12-12 | Stop reason: HOSPADM

## 2022-12-06 RX ORDER — CLONAZEPAM 0.5 MG/1
2 TABLET ORAL ONCE
Status: COMPLETED | OUTPATIENT
Start: 2022-12-06 | End: 2022-12-06

## 2022-12-06 RX ORDER — CLONAZEPAM 1 MG/1
4 TABLET ORAL
Status: DISCONTINUED | OUTPATIENT
Start: 2022-12-06 | End: 2022-12-12 | Stop reason: HOSPADM

## 2022-12-06 RX ORDER — CLONAZEPAM 1 MG/1
4 TABLET ORAL ONCE
Status: DISCONTINUED | OUTPATIENT
Start: 2022-12-06 | End: 2022-12-06

## 2022-12-06 RX ADMIN — PIPERACILLIN AND TAZOBACTAM 3375 MG: 3; .375 INJECTION, POWDER, FOR SOLUTION INTRAVENOUS at 11:04

## 2022-12-06 RX ADMIN — AMITRIPTYLINE HYDROCHLORIDE 50 MG: 50 TABLET, FILM COATED ORAL at 22:16

## 2022-12-06 RX ADMIN — ENOXAPARIN SODIUM 40 MG: 100 INJECTION SUBCUTANEOUS at 11:05

## 2022-12-06 RX ADMIN — SODIUM CHLORIDE, PRESERVATIVE FREE 10 ML: 5 INJECTION INTRAVENOUS at 08:45

## 2022-12-06 RX ADMIN — SODIUM CHLORIDE, PRESERVATIVE FREE 10 ML: 5 INJECTION INTRAVENOUS at 21:00

## 2022-12-06 RX ADMIN — CLONAZEPAM 2 MG: 0.5 TABLET ORAL at 05:44

## 2022-12-06 RX ADMIN — LEVOTHYROXINE SODIUM 125 MCG: 0.07 TABLET ORAL at 08:44

## 2022-12-06 RX ADMIN — CLONAZEPAM 2 MG: 0.5 TABLET ORAL at 05:35

## 2022-12-06 RX ADMIN — PIPERACILLIN AND TAZOBACTAM 3375 MG: 3; .375 INJECTION, POWDER, FOR SOLUTION INTRAVENOUS at 16:44

## 2022-12-06 RX ADMIN — CARVEDILOL 6.25 MG: 6.25 TABLET, FILM COATED ORAL at 11:05

## 2022-12-06 RX ADMIN — OLANZAPINE 7.5 MG: 2.5 TABLET, FILM COATED ORAL at 22:16

## 2022-12-06 ASSESSMENT — PAIN SCALES - GENERAL
PAINLEVEL_OUTOF10: 0
PAINLEVEL_OUTOF10: 0

## 2022-12-06 NOTE — PROGRESS NOTES
TRANSFER - IN REPORT:    Verbal report received from MIGUEL Cardenas on [address]  being received from ED Overflow for routine progression of patient care      Report consisted of patient's Situation, Background, Assessment and   Recommendations(SBAR). Information from the following report(s) Nurse Handoff Report was reviewed with the receiving nurse. Opportunity for questions and clarification was provided.

## 2022-12-06 NOTE — PROGRESS NOTES
TRANSFER - IN REPORT:    Verbal report received from RN on [address]  being received from ED for routine progression of patient care      Report consisted of patient's Situation, Background, Assessment and   Recommendations(SBAR). Information from the following report(s) Adult Overview was reviewed with the receiving nurse. Opportunity for questions and clarification was provided.

## 2022-12-06 NOTE — PROGRESS NOTES
Hospitalist Progress Note   Admit Date:  2022  8:32 AM   Name:  Brianna Nolasco   Age:  76 y.o. Sex:  male  :  1945   MRN:  [id number]   Room:  ER/    Presenting Complaint: Fatigue     Reason(s) for Admission: Complicated UTI (urinary tract infection) [N39.0]     Hospital Course:   Patient is a 76 y.o. male who presented to the ED for cc generalized weakness since this past Friday. Nothing seems to make better or worse. Denies difficulty with vision or lid lag. H/O rectal CA with colostomy no longer on treatment, urostomies related to radiation, due for right stent exchange this month, hypothyroidism, chronic slurred speech, admission from 10/16-10/27 for lower extremity weakness receiving IVIG (But I cannot find diagnosis as to what they thought were going on), multiple abdominal surgeries, and recent discharge from our facility on  for SBO. Admitted with UTI from catheter. Urology consulted. Hx of e faecalis and pseudomonas so zosyn started. Subjective & 24hr Events (22): Pt still feels tired and weak but otherwise no complaints. He is anxious to speak to urology about plan of care. Assessment & Plan:       UTI due to indwelling catheter  -cont zosyn due to history of pseudomonas and E faecalis in past.  Don't need vanc so will stop this. -follow urine cx; GNRs today      Hx of carr, urostomies, and right utereteral stent   - Due for exchange. consulted urology and appreciate help. Mild episode of recurrent major depressive disorder (HCC)  -Controlled. Continue home meds      Hypothyroid  -cont synthroid      HTN  -cont coreg      Chronic debility  -PT/OT      Colostomy status  -routine care      Anticipated discharge needs:      Therapy and PPD ordered    Diet:  ADULT DIET;  Regular; Low Fat/Low Chol/High Fiber/2 gm Na  DVT PPx: lovenox  Code status: Full Code    Hospital Problems:  Principal Problem:    Complicated UTI (urinary tract infection)  Active Problems:    Mild episode of recurrent major depressive disorder (HCC)    Leg weakness, bilateral    Rectal cancer ([address]  Resolved Problems:    * No resolved hospital problems. *      Objective:   Patient Vitals for the past 24 hrs:   Temp Pulse Resp BP SpO2   12/06/22 0730 97.5 °F (36.4 °C) 72 17 129/83 96 %   12/06/22 0545 -- 83 18 120/80 96 %   12/06/22 0530 -- 82 15 138/86 96 %   12/06/22 0230 97.4 °F (36.3 °C) 67 18 127/69 92 %   12/06/22 0211 -- 66 18 118/84 97 %   12/06/22 0141 -- 82 15 116/73 96 %   12/06/22 0132 -- 78 17 119/73 96 %   12/06/22 0112 -- 83 11 131/81 95 %   12/05/22 2132 -- 89 15 (!) 142/76 93 %   12/05/22 2119 -- 81 19 (!) 177/92 97 %   12/05/22 2049 -- 82 13 (!) 145/96 --   12/05/22 2019 -- 90 -- 128/69 95 %   12/05/22 1929 -- 96 23 135/73 --   12/05/22 1911 -- 89 16 -- --   12/05/22 1900 -- 92 26 (!) 130/101 --   12/05/22 1840 -- 77 15 (!) 140/89 --   12/05/22 1830 -- 81 17 (!) 143/91 --   12/05/22 1810 -- 73 17 (!) 151/85 --   12/05/22 1800 -- 73 16 136/80 --   12/05/22 1719 -- 82 18 (!) 146/83 --   12/05/22 1213 -- 81 12 -- 98 %   12/05/22 1200 -- 81 18 (!) 148/87 97 %   12/05/22 1140 -- 65 15 (!) 144/76 99 %   12/05/22 1130 -- 70 15 129/76 99 %   12/05/22 1110 -- 68 14 (!) 142/77 98 %       Oxygen Therapy  SpO2: 96 %  Pulse via Oximetry: 83 beats per minute  O2 Device: None (Room air)    Estimated body mass index is 20.37 kg/m² as calculated from the following:    Height as of this encounter: 5' 3\" (1.6 m). Weight as of this encounter: 115 lb (52.2 kg). No intake or output data in the 24 hours ending 12/06/22 0959      Physical Exam:     Blood pressure 129/83, pulse 72, temperature 97.5 °F (36.4 °C), temperature source Oral, resp. rate 17, height 5' 3\" (1.6 m), weight 115 lb (52.2 kg), SpO2 96 %. General:    Well nourished. Head:  Normocephalic, atraumatic  Eyes:  Sclerae appear normal.  Pupils equally round. ENT:  Nares appear normal, no drainage.   Moist oral mucosa  Neck:  No restricted ROM. Trachea midline   CV:   RRR. No jugular venous distension. Lungs:   Unlabored. Symmetric expansion. Abdomen:   Colostomy intact. nondistended. Extremities: No cyanosis or clubbing. No edema  Skin:     No rashes and normal coloration. Warm and dry. Neuro:  CN II-XII grossly intact. Sensation intact. A&Ox3  Psych:  Normal mood and affect.       I have personally reviewed labs and tests showing:  Recent Labs:  Recent Results (from the past 48 hour(s))   EKG 12 Lead    Collection Time: 12/05/22  8:43 AM   Result Value Ref Range    Ventricular Rate 71 BPM    Atrial Rate 72 BPM    P-R Interval 187 ms    QRS Duration 115 ms    Q-T Interval 430 ms    QTc Calculation (Bazett) 468 ms    P Axis -55 degrees    R Axis 92 degrees    T Axis 76 degrees    Diagnosis Sinus or ectopic atrial rhythm    CBC with Auto Differential    Collection Time: 12/05/22  9:35 AM   Result Value Ref Range    WBC 5.9 4.3 - 11.1 K/uL    RBC 4.50 4.23 - 5.6 M/uL    Hemoglobin 13.3 (L) 13.6 - 17.2 g/dL    Hematocrit 41.2 41.1 - 50.3 %    MCV 91.6 82 - 102 FL    MCH 29.6 26.1 - 32.9 PG    MCHC 32.3 31.4 - 35.0 g/dL    RDW 14.9 (H) 11.9 - 14.6 %    Platelets 478 419 - 763 K/uL    MPV 9.4 9.4 - 12.3 FL    nRBC 0.00 0.0 - 0.2 K/uL    Differential Type AUTOMATED      Seg Neutrophils 85 (H) 43 - 78 %    Lymphocytes 7 (L) 13 - 44 %    Monocytes 6 4.0 - 12.0 %    Eosinophils % 1 0.5 - 7.8 %    Basophils 0 0.0 - 2.0 %    Immature Granulocytes 0 0.0 - 5.0 %    Segs Absolute 5.0 1.7 - 8.2 K/UL    Absolute Lymph # 0.4 (L) 0.5 - 4.6 K/UL    Absolute Mono # 0.4 0.1 - 1.3 K/UL    Absolute Eos # 0.0 0.0 - 0.8 K/UL    Basophils Absolute 0.0 0.0 - 0.2 K/UL    Absolute Immature Granulocyte 0.0 0.0 - 0.5 K/UL   Comprehensive Metabolic Panel    Collection Time: 12/05/22  9:35 AM   Result Value Ref Range    Sodium 134 133 - 143 mmol/L    Potassium 4.4 3.5 - 5.1 mmol/L    Chloride 105 101 - 110 mmol/L    CO2 22 21 - 32 mmol/L Anion Gap 7 2 - 11 mmol/L    Glucose 84 65 - 100 mg/dL    BUN 23 8 - 23 MG/DL    Creatinine 1.60 (H) 0.8 - 1.5 MG/DL    Est, Glom Filt Rate 44 (L) >60 ml/min/1.73m2    Calcium 9.2 8.3 - 10.4 MG/DL    Total Bilirubin 0.5 0.2 - 1.1 MG/DL    ALT 30 12 - 65 U/L    AST 37 15 - 37 U/L    Alk Phosphatase 90 50 - 136 U/L    Total Protein 8.6 (H) 6.3 - 8.2 g/dL    Albumin 3.7 3.2 - 4.6 g/dL    Globulin 4.9 (H) 2.8 - 4.5 g/dL    Albumin/Globulin Ratio 0.8 0.4 - 1.6     Magnesium    Collection Time: 12/05/22  9:35 AM   Result Value Ref Range    Magnesium 1.9 1.8 - 2.4 mg/dL   Troponin    Collection Time: 12/05/22  9:35 AM   Result Value Ref Range    Troponin, High Sensitivity 47.0 (H) 0 - 14 pg/mL   Lactate, Sepsis    Collection Time: 12/05/22 10:21 AM   Result Value Ref Range    Lactic Acid, Sepsis 1.2 0.4 - 2.0 MMOL/L   Urinalysis with Reflex to Culture    Collection Time: 12/05/22 10:21 AM    Specimen: Urine   Result Value Ref Range    Color, UA YELLOW/STRAW      Appearance TURBID      Specific Gravity, UA 1.007 1.001 - 1.023      pH, Urine 6.0 5.0 - 9.0      Protein,  (A) NEG mg/dL    Glucose, UA Negative mg/dL    Ketones, Urine TRACE (A) NEG mg/dL    Bilirubin Urine Negative NEG      Blood, Urine MODERATE (A) NEG      Urobilinogen, Urine 0.2 0.2 - 1.0 EU/dL    Nitrite, Urine Positive (A) NEG      Leukocyte Esterase, Urine LARGE (A) NEG      WBC, UA >100 0 /hpf    RBC, UA 0 0 /hpf    BACTERIA, URINE 4+ (H) 0 /hpf    Urine Culture if Indicated URINE CULTURE ORDERED      Epithelial Cells UA 0 0 /hpf    Casts 3-5 0 /lpf    Crystals 0 0 /LPF    Mucus, UA 0 0 /lpf    Other observations RESULTS VERIFIED MANUALLY     Culture, Blood 1    Collection Time: 12/05/22 10:21 AM    Specimen: Blood   Result Value Ref Range    Special Requests LEFT  Antecubital        Culture NO GROWTH AFTER 20 HOURS     Culture, Urine    Collection Time: 12/05/22 10:21 AM    Specimen: Urine   Result Value Ref Range    Special Requests NO SPECIAL REQUESTS  Reflexed from [id number]        Culture (A)       >100,000 COLONIES/mL GRAM NEGATIVE RODS SUBCULTURE IN PROGRESS   Troponin    Collection Time: 12/05/22 11:57 AM   Result Value Ref Range    Troponin, High Sensitivity 42.1 (H) 0 - 14 pg/mL   Basic Metabolic Panel w/ Reflex to MG    Collection Time: 12/06/22  7:08 AM   Result Value Ref Range    Sodium 141 133 - 143 mmol/L    Potassium 3.7 3.5 - 5.1 mmol/L    Chloride 111 (H) 101 - 110 mmol/L    CO2 22 21 - 32 mmol/L    Anion Gap 8 2 - 11 mmol/L    Glucose 73 65 - 100 mg/dL    BUN 21 8 - 23 MG/DL    Creatinine 1.30 0.8 - 1.5 MG/DL    Est, Glom Filt Rate 56 (L) >60 ml/min/1.73m2    Calcium 8.4 8.3 - 10.4 MG/DL   CBC with Auto Differential    Collection Time: 12/06/22  7:08 AM   Result Value Ref Range    WBC 7.7 4.3 - 11.1 K/uL    RBC 4.34 4.23 - 5.6 M/uL    Hemoglobin 12.4 (L) 13.6 - 17.2 g/dL    Hematocrit 39.0 (L) 41.1 - 50.3 %    MCV 89.9 82 - 102 FL    MCH 28.6 26.1 - 32.9 PG    MCHC 31.8 31.4 - 35.0 g/dL    RDW 14.7 (H) 11.9 - 14.6 %    Platelets 794 227 - 745 K/uL    MPV 10.4 9.4 - 12.3 FL    nRBC 0.00 0.0 - 0.2 K/uL    Differential Type AUTOMATED      Seg Neutrophils 86 (H) 43 - 78 %    Lymphocytes 5 (L) 13 - 44 %    Monocytes 9 4.0 - 12.0 %    Eosinophils % 0 (L) 0.5 - 7.8 %    Basophils 0 0.0 - 2.0 %    Immature Granulocytes 0 0.0 - 5.0 %    Segs Absolute 6.7 1.7 - 8.2 K/UL    Absolute Lymph # 0.4 (L) 0.5 - 4.6 K/UL    Absolute Mono # 0.7 0.1 - 1.3 K/UL    Absolute Eos # 0.0 0.0 - 0.8 K/UL    Basophils Absolute 0.0 0.0 - 0.2 K/UL    Absolute Immature Granulocyte 0.0 0.0 - 0.5 K/UL       I have personally reviewed imaging studies showing: Other Studies:  XR CHEST PORTABLE   Final Result   1. No acute abnormality in the chest.   2. No acute abdominal or pelvic abnormality. Interval decrease in the small   bowel dilation. XR ABDOMEN (KUB) (SINGLE AP VIEW)   Final Result   1.  No acute abnormality in the chest.   2. No acute abdominal or pelvic abnormality. Interval decrease in the small   bowel dilation. Current Meds:  Current Facility-Administered Medications   Medication Dose Route Frequency    polyethylene glycol (GLYCOLAX) packet 17 g  17 g Oral Daily PRN    levothyroxine (SYNTHROID) tablet 125 mcg  125 mcg Oral Daily    cefepime (MAXIPIME) 2,000 mg in sodium chloride 0.9 % 50 mL IVPB mini-bag  2,000 mg IntraVENous Q24H    amitriptyline (ELAVIL) tablet 50 mg  50 mg Oral Nightly    carvedilol (COREG) tablet 6.25 mg  6.25 mg Oral BID WC    [Held by provider] OLANZapine (ZYPREXA) tablet 7.5 mg  7.5 mg Oral Nightly    clonazePAM (KLONOPIN) tablet 2 mg  2 mg Oral Q8H PRN    sodium chloride flush 0.9 % injection 5-40 mL  5-40 mL IntraVENous 2 times per day    sodium chloride flush 0.9 % injection 5-40 mL  5-40 mL IntraVENous PRN    0.9 % sodium chloride infusion   IntraVENous PRN    acetaminophen (TYLENOL) tablet 650 mg  650 mg Oral Q6H PRN    Or    acetaminophen (TYLENOL) suppository 650 mg  650 mg Rectal Q6H PRN    tuberculin injection 5 Units  5 Units IntraDERmal Once     Current Outpatient Medications   Medication Sig    polyethylene glycol (GLYCOLAX) 17 g packet Take 17 g by mouth daily as needed for Constipation    amitriptyline (ELAVIL) 50 MG tablet Take 1 tablet by mouth nightly    carvedilol (COREG) 6.25 MG tablet Take 1 tablet by mouth 2 times daily (with meals)    levothyroxine (SYNTHROID) 125 MCG tablet TAKE ONE TABLET BY MOUTH ONE TIME DAILY BEFORE BREAKFAST    OLANZapine (ZYPREXA) 2.5 MG tablet Take 7.5 mg by mouth nightly       Signed:  Army Brit MD    Part of this note may have been written by using a voice dictation software. The note has been proof read but may still contain some grammatical/other typographical errors.

## 2022-12-06 NOTE — PROGRESS NOTES
ACUTE PHYSICAL THERAPY GOALS:   (Developed with and agreed upon by patient and/or caregiver.)  ST. Patient will perform bed mobility with SUPERVISION within 3 days. 2. Patient will transfer bed to chair with CONTACT GUARD ASSISTANCE within 3 days. 3. Patient will demonstrate FAIR DYNAMIC STANDING balance within 3 day(s). 4. Patient will ambulate 25ft+ using least restrictive assistive device and MINIMAL ASSISTANCE within 3 days. 5. Patient will tolerate 15+ minutes of therapeutic activity/exercise and/or neuromuscular re-education while maintaining stable vitals to improve functional strength and activity tolerance within 3 days. LT. Patient will perform bed mobility with INDEPENDENCE within 7 days. 2. Patient will transfer bed to chair with STAND BY ASSISTANCE within 7 days. 3. Patient will demonstrate FAIR+ DYNAMIC STANDING balance within 7 day(s). 4. Patient will ambulate 75ft+ using least restrictive assistive device and CONTACT GUARD ASSISTANCE within 7 days. 5. Patient will tolerate 25+ minutes of therapeutic activity/exercise and/or neuromuscular re-education while maintaining stable vitals to improve functional strength and activity tolerance within 7 days.       PHYSICAL THERAPY Initial Assessment, Daily Note, and AM  (Link to Caseload Tracking: PT Visit Days : 1  Acknowledge Orders  Time In/Out  PT Charge Capture  Rehab Caseload Tracker    Shyam Zepeda is a 76 y.o. male   PRIMARY DIAGNOSIS: Urinary tract infection associated with indwelling urethral catheter ([facility] [address]  Complicated UTI (urinary tract infection) [N39.0]       Reason for Referral: Generalized Muscle Weakness (M62.81)  Difficulty in walking, Not elsewhere classified (R26.2)  Inpatient: Payor: MEDICARE / Plan: MEDICARE PART A AND B / Product Type: *No Product type* /     ASSESSMENT:     REHAB RECOMMENDATIONS:   Recommendation to date pending progress:  Setting:  Short-term Rehab    Equipment:    To Be Determined ASSESSMENT:  Mr. Rosalba Gillette is a pleasant 76year old male admitted with UTI and functional weakness. Patient is seen this AM for initial PT evaluation. At baseline, patient lives alone and was recently home from [facility] where he reports progressing to independence with ambulation and ADLs without utilizing an assistive device. Today, patient presents with decreased functional strength, activity tolerance, and balance/gait status. Required minimal assistance x2 to mobilize today. Patient is a fall risk at this time. See detailed assessment below. Recommend STR at discharge. Will follow with stated plan of care during acute stay. [address] [facility] [address] [address] AM-PAC 6 Clicks Basic Mobility Inpatient Short Form  AM-PAC Mobility Inpatient   How much difficulty turning over in bed?: A Little  How much difficulty sitting down on / standing up from a chair with arms?: A Little  How much difficulty moving from lying on back to sitting on side of bed?: A Little  How much help from another person moving to and from a bed to a chair?: A Lot  How much help from another person needed to walk in hospital room?: A Lot  How much help from another person for climbing 3-5 steps with a railing?: A Lot  AM-PAC Inpatient Mobility Raw Score : 15  AM-PAC Inpatient T-Scale Score : 39.45  Mobility Inpatient CMS 0-100% Score: 57.7  Mobility Inpatient CMS G-Code Modifier : CK    SUBJECTIVE:   Mr. Rosalba Gillette states, \"I am weak. \"     Social/Functional Lives With: Alone  Type of Home: House ([employer])  Home Layout: Two level (0 [place])  ADL Assistance:  (Reports independent since recent discharge home from [facility])  Ambulation Assistance:  (Reports independent without utilizing an assistive device since recent discharge home from [facility])  Transfer Assistance:  (Reports independent without utilizing an assistive device since recent discharge home from [facility])  Active : Yes    OBJECTIVE:     PAIN: VITALS / O2: Jessie Bores / Jonas Lyme / DRAINS:   Pre Treatment:   Pain Assessment: None - Denies Pain      Post Treatment: 0/10 Vitals        Oxygen      John Catheter, IV, and ER monitors    RESTRICTIONS/PRECAUTIONS:  Restrictions/Precautions: Fall Risk                 GROSS EVALUATION: Intact Impaired (Comments):   AROM []  Mild hamstring tightness, WFL   PROM []    Strength []  Generalized B LE and trunk (R LE chronically weaker)   Balance []  Fair dynamic sitting and fair- dynamica standing balance   Posture [] Forward Head  Rounded Shoulders  Trunk Flexion   Sensation [x]  Intact to light touch distal B LE   Coordination []      Tone []     Edema []    Activity Tolerance []  Impaired from baseline    []      COGNITION/  PERCEPTION: Intact Impaired (Comments):   Orientation [x]  Oriented x3   Vision []     Hearing []     Cognition  []  Impaired safety awareness/need for assistance     MOBILITY: I Mod I S SBA CGA Min Mod Max Total  NT x2 Comments:   Bed Mobility    Rolling [] [] [] [] [] [] [] [] [] [x] []    Supine to Sit [] [] [] [] [] [x] [] [] [] [] []    Scooting [] [] [] [] [] [x] [] [] [] [] []    Sit to Supine [] [] [] [] [] [x] [] [] [] [] []    Transfers    Sit to Stand [] [] [] [] [] [x] [] [] [] [] []    Bed to Chair [] [] [] [] [] [] [] [] [] [x] []    Stand to Sit [] [] [] [] [] [x] [] [] [] [] []     [] [] [] [] [] [] [] [] [] [] []    I=Independent, Mod I=Modified Independent, S=Supervision, SBA=Standby Assistance, CGA=Contact Guard Assistance,   Min=Minimal Assistance, Mod=Moderate Assistance, Max=Maximal Assistance, Total=Total Assistance, NT=Not Tested    GAIT: I Mod I S SBA CGA Min Mod Max Total  NT x2 Comments:   Level of Assistance [] [] [] [] [] [x] [] [] [] [] [x]    Distance 3 feet    DME Handheld    Gait Quality Decreased zoe , Decreased step clearance, Decreased step length, Narrow base of support, Step-to, and Trunk flexion    Weightbearing Status Restrictions/Precautions  Restrictions/Precautions: Fall Risk    Stairs      I=Independent, Mod I=Modified Independent, S=Supervision, SBA=Standby Assistance, CGA=Contact Guard Assistance,   Min=Minimal Assistance, Mod=Moderate Assistance, Max=Maximal Assistance, Total=Total Assistance, NT=Not Tested    PLAN:   FREQUENCY AND DURATION: 3 times/week for duration of hospital stay or until stated goals are met, whichever comes first.    THERAPY PROGNOSIS: Good    PROBLEM LIST:   (Skilled intervention is medically necessary to address:)  Decreased ADL/Functional Activities  Decreased Activity Tolerance  Decreased Balance  Decreased Gait Ability  Decreased Safety Awareness  Decreased Strength  Decreased Transfer Abilities INTERVENTIONS PLANNED:   (Benefits and precautions of physical therapy have been discussed with the patient.)  Therapeutic Activity  Therapeutic Exercise/HEP  Neuromuscular Re-education  Gait Training  Education       TREATMENT:   EVALUATION: MODERATE COMPLEXITY: (Untimed Charge)  At this time, patient is appropriate for Co-treatment with occupational therapy due to patient's decreased overall endurance/tolerance levels, as well as need for high level skilled assistance to complete functional transfers/mobility and functional tasks. Tristen Pool is appropriate for a multidisciplinary co-treatment of PT and OT to address goals of both disciplines. TREATMENT:   Neuromuscular Re-education (10 Minutes): Neuromuscular Re-education included Balance Training, Functional mobility with facilitation, Postural training, Sitting balance training, and Standing balance training to improve Balance, Functional Mobility, and Postural Control.     TREATMENT GRID:  N/A    AFTER TREATMENT PRECAUTIONS: Bed, Bed/Chair Locked, Call light within reach, Needs within reach, RN at bedside, RN notified, Side rails x2, and on stretcher in ER and monitors replaced, RN notified  and at bedside    INTERDISCIPLINARY COLLABORATION:  RN/ PCT, PT/ PTA, and OT/ F[place]TH    EDUCATION: Education Given To: Patient  Education Provided: Role of Therapy;Plan of Care; Fall Prevention Strategies;Transfer Training  Education Method: Demonstration;Verbal  Barriers to Learning: None  Education Outcome: Verbalized understanding;Demonstrated understanding    TIME IN/OUT:  Time In: 1045  Time Out: 300 [facility]  Minutes: 1323 [facility],

## 2022-12-06 NOTE — PROGRESS NOTES
Pt is stable with bed in lowest position, wheels locked, and call light within reach. Hourly rounds completed. VSS. IV is patent and dressing is clean, dry, and intact. Pt explained multiple times about medication and not displaying understanding. No complaints of pain. Report to be given to day shift nurse.

## 2022-12-06 NOTE — PROGRESS NOTES
ACUTE OCCUPATIONAL THERAPY GOALS:   (Developed with and agreed upon by patient and/or caregiver.)  1) Patient will complete lower body bathing and dressing with mod I and adaptive equipment as needed. 2) Patient will complete toileting with mod i. 3) Patient will complete functional transfers with mod I and adaptive equipment as needed. 4) Patient will tolerate at least 15 minutes of OT activity with as needed rest breaks while maintaining O2 sats >90%. 5) Patient will verbalize at least 3 energy conservation technique to utilize during ADL/IADL. 6) Patient will complete grooming tasks in standing at sink level with mod I. Timeframe: 7 visits      OCCUPATIONAL THERAPY Initial Assessment, Daily Note, and AM       OT Visit Days: 1  Acknowledge Orders  Time  OT Charge Capture  Rehab Caseload Tracker      Meryl Perrin is a 76 y.o. male   PRIMARY DIAGNOSIS: Urinary tract infection associated with indwelling urethral catheter ([facility] [address]  Complicated UTI (urinary tract infection) [N39.0]       Reason for Referral: Generalized Muscle Weakness (M62.81)  Other lack of cordination (R27.8)  Difficulty in walking, Not elsewhere classified (R26.2)  Other abnormalities of gait and mobility (R26.89)  Inpatient: Payor: MEDICARE / Plan: MEDICARE PART A AND B / Product Type: *No Product type* /     ASSESSMENT:     REHAB RECOMMENDATIONS:   Recommendation to date pending progress:  Setting:  Short-term Rehab    Equipment:    To Be Determined  Could benefit from RW to improve safety with gait and ambulation     ASSESSMENT:  Mr. Leti Soto is a 75 YO R dominant WM admitted from home to the ER and found to have above. Pt reports he is to have surgery Thursday, but carr to be changed out now per Dr Beulah Alvarado. Pt perseverated on this. RN aware and reports she does not have orders yet to change the carr. PMH Rectal cancer with colostomy in place, chronic carr, slurred speech at baseline, hard to understand.  Recent DC 11/18 for SBO and went to [facility], was DCd to home about 3 weeks and was initially walking and doing well while at [facility]. Since then he has not been able to ambulate much and feeling bad overall. Pt reports he lives alone in 2 level condo with no steps at entrance, but bed and bathroom are upstairs on the 2nd level, has HR. Has walk in shower. Per pt, he is normally independent with all ADLs, cleans and changes Carr and Colostomy, does his own IADLs, driving, retired, no falls, no DME owned or used. Today, leg bag draining urine on R LE as well as another large bag draining clear urine. Colostomy in place and draining as well. Pt with telemetry, continuous O2 monitor, and IV running. On Room air. /73 HR 88. Agreeable to getting up and asking to get OOB for a stretch. Min A bed mobility up to the edge of the stretcher. Mod A donning socks. Sit to stand with min A x2, fair standing balance. Took steps to [facility] with min Ax2. Cleaned up pt and changed bed linens while pt standing. Pt with clear urine, until he stood up, then morena bright red blood ran through the carr tubing. RN alerted. Pt returned to supine and made comfortable. Dep x2 moving up on stretcher. Falls risk. RN at bedside. Pt is functioning below his reported baseline and could benefit from skilled OT to address his deficits and maximize his independence, safety and activity tolerance for ADLs and functional mobility. Recommend STR unless he has good support at home, which pt reports he does not have.         [facility] [address] [id number] AM-PAC 6 Clicks Daily Activity Inpatient Short Form:    AM-PAC Daily Activity Inpatient   How much help for putting on and taking off regular lower body clothing?: A Lot  How much help for Bathing?: A Lot  How much help for Toileting?: Total (colostomy and carr in place)  How much help for putting on and taking off regular upper body clothing?: A Little  How much help for taking care of personal grooming?: A Little  How much help for eating meals?: None  AM-[facility] Inpatient Daily Activity Raw Score: 15  AM-PAC Inpatient ADL T-Scale Score : 34.69  ADL Inpatient CMS 0-100% Score: 56.46  ADL Inpatient CMS G-Code Modifier : CK        SUBJECTIVE:     Mr. Lio Ding states, \"No, I am all by myself. No family and no friends. \"     Social/Functional Lives With: Alone  Type of Home: House ([facility])  Home Layout: Two level (0 [address])  ADL Assistance:  (Reports independent since recent discharge home from [address] [place] [address])  Ambulation Assistance:  (Reports independent without utilizing an assistive device since recent discharge home from [address] [place] [address])  Transfer Assistance:  (Reports independent without utilizing an assistive device since recent discharge home from [address] [place] [address])  Active : Yes    OBJECTIVE:     Mohan Antonio / Kade Wooten / AIRWAY: Colostomy, Continuous Pulse Oximetry, John Catheter, IV, Telemetry , and ER monitors    RESTRICTIONS/PRECAUTIONS:  Restrictions/Precautions: Fall Risk    PAIN: VITALS / O2:   Pre Treatment: no complaint of pain         Post Treatment: no complaint of pain       Vitals 120/73 HR 88         Oxygen room air, sats 96%            GROSS EVALUATION: INTACT IMPAIRED   (See Comments)   UE AROM [x] []   UE PROM [] []   Strength [x]  Generally weaker, BUE WFLs for basic self care tasks, R LE with h/o muscle removal, chronically weaker     Posture / Balance []  Fair sitting and fair- dynamic standing, rounded shoulders, forward head, trunk flexion   Sensation [x]     Coordination [x]  B UEs     Tone []       Edema []    Activity Tolerance []  Generally decreased from baseline     Hand Dominance R [x] L []      COGNITION/  PERCEPTION: INTACT IMPAIRED   (See Comments)   Orientation [x]  X3 not date   Vision [x]     Hearing [x]     Cognition  []  Impaired speech at baseline, decreased safety awareness   Perception []  impaired     MOBILITY: I Mod I S SBA CGA Min Mod Max Total  NT x2 Comments:   Bed Mobility    Rolling [] [] [] [] [] [] [] [] [] [x] [] stretcher   Supine to Sit [] [] [] [] [] [x] [] [] [] [] []    Scooting [] [] [] [] [] [x] [] [] [] [] []    Sit to Supine [] [] [] [] [] [x] [] [] [] [] []    Transfers    Sit to Stand [] [] [] [] [] [x] [] [] [] [] []    Bed to Chair [] [] [] [] [] [] [] [] [] [x] [] No chairs in ER   Stand to Sit [] [] [] [] [] [x] [] [] [] [] []    Tub/Shower [] [] [] [] [] [] [] [] [] [x] []     Toilet [] [] [] [] [] [] [] [] [] [x] []      [] [] [] [] [] [] [] [] [] [] []    I=Independent, Mod I=Modified Independent, S=Supervision/Setup, SBA=Standby Assistance, CGA=Contact Guard Assistance, Min=Minimal Assistance, Mod=Moderate Assistance, Max=Maximal Assistance, Total=Total Assistance, NT=Not Tested    ACTIVITIES OF DAILY LIVING: I Mod I S SBA CGA Min Mod Max Total NT Comments   BASIC ADLs:              Upper Body Bathing  [] [] [] [] [] [x] [] [] [] []    Lower Body Bathing [] [] [] [] [] [] [x] [] [] []    Toileting [] [] [] [] [] [] [] [] [x] [] John and colostomy in place, RN caring for both   Upper Body Dressing [] [] [] [] [] [x] [] [] [] [] gown   Lower Body Dressing [] [] [] [] [] [] [] [x] [] [] socks   Feeding [] [] [x] [] [] [] [] [] [] []    Grooming [] [] [] [] [] [x] [] [] [] []    Personal Device Care [] [] [] [] [] [] [] [] [] []    Functional Mobility [] [] [] [] [] [x] [] [] [] []    I=Independent, Mod I=Modified Independent, S=Supervision/Setup, SBA=Standby Assistance, CGA=Contact Guard Assistance, Min=Minimal Assistance, Mod=Moderate Assistance, Max=Maximal Assistance, Total=Total Assistance, NT=Not Tested    PLAN:   FREQUENCY/DURATION   OT Plan of Care: 3 times/week for duration of hospital stay or until stated goals are met, whichever comes first.    PROBLEM LIST:   (Skilled intervention is medically necessary to address:)  Decreased ADL/Functional Activities  Decreased Activity Tolerance  Decreased AROM/PROM  Decreased Balance  Decreased Cognition  Decreased Coordination  Decreased Gait Ability  Decreased Safety Awareness  Decreased Strength  Decreased Transfer Abilities   INTERVENTIONS PLANNED:  (Benefits and precautions of occupational therapy have been discussed with the patient.)  Self Care Training  Therapeutic Activity  Therapeutic Exercise/HEP  Neuromuscular Re-education  Gait Training  Manual Therapy  Education         TREATMENT:     EVALUATION: MODERATE COMPLEXITY: (Untimed Charge)    TREATMENT:   Co-Treatment PT/OT necessary due to patient's decreased overall endurance/tolerance levels, as well as need for high level skilled assistance to complete functional transfers/mobility and functional tasks  Self Care (24 minutes): Patient participated in upper body bathing, lower body bathing, toileting, upper body dressing, lower body dressing, self feeding, and grooming ADLs in unsupported sitting, standing, and supine with minimal visual, verbal, manual, and tactile cueing to increase independence, decrease assistance required, increase activity tolerance, and increase safety awareness. Patient also participated in functional mobility, functional transfer, energy conservation, and adaptive equipment training to increase independence, decrease assistance required, increase activity tolerance, and increase safety awareness. TREATMENT GRID:  N/A    AFTER TREATMENT PRECAUTIONS: Alarm Activated, Bed, Bed/Chair Locked, Call light within reach, Heels floated, Needs within reach, RN at bedside, RN notified, Side rails x2, and in stretcher    INTERDISCIPLINARY COLLABORATION:  RN/ PCT, PT/ PTA, OT/ FARNSWORTH, and RN Case Manager/      EDUCATION:  Education Given To: Patient;Staff  Education Provided: Role of Therapy;Plan of Care;Precautions; ADL Adaptive Strategies;Transfer Training;Energy Conservation;IADL Safety;Orientation;Equipment; Fall Prevention Strategies  Education Method: Demonstration;Verbal  Barriers to Learning: Other (Comment); Cognition (slurred speech hard to understand)  Education Outcome: Continued education needed    TOTAL TREATMENT DURATION AND TIME:  Time In: 1040  Time Out: 1104  Minutes: 24    MAVIS BROWNING, OT   Mavis Browning, MS, OTR/L

## 2022-12-06 NOTE — PROGRESS NOTES
VANCO DAILY FOLLOW UP NOTE  [address] [facility] Pharmacokinetic Monitoring Service - Vancomycin    Consulting Provider: Dr. Pete Both   Indication: UTI  Target Concentration: Goal trough of 10-15 mg/L and AUC/DAAN <500 mg*hr/L  Day of Therapy: 2 of 7  Additional Antimicrobials: cefepime    Patient eligible for piperacillin-tazobactam to cefepime auto-substitution per P&T approved protocol? YES    Pertinent Laboratory Values: Wt Readings from Last 1 Encounters:   12/05/22 115 lb (52.2 kg)     Temp Readings from Last 1 Encounters:   12/06/22 97.5 °F (36.4 °C) (Oral)     Recent Labs     12/05/22  0935 12/06/22  0708   BUN 23 21   CREATININE 1.60* 1.30   WBC 5.9 7.7     Estimated Creatinine Clearance: 34 mL/min (based on SCr of 1.3 mg/dL). [facility]: [facility]    MRSA Nasal Swab: N/A. Non-respiratory infection. Assessment:  Date/Time Dose Concentration AUC         Note: Serum concentrations collected for AUC dosing may appear elevated if collected in close proximity to the dose administered, this is not necessarily an indication of toxicity    Plan:  Dosing recommendations based on Bayesian software  Started on vancomycin 750 mg q24h  Anticipated AUC of 466 and trough concentration of 15.1 at steady state  Renal labs as indicated   Vancomycin concentration ordered for 12/7/22 with AM labs. Pharmacy will continue to monitor patient and adjust therapy as indicated    Thank you for the consult,  Herminio Coronel.  [facility]

## 2022-12-06 NOTE — PROGRESS NOTES
Admit Date: 12/5/2022      Subjective:     Davis Beverly is seen by Dr. Aleks Gonzalez this morning. Has carr in place. VSS. Afebrile. Objective:     Patient Vitals for the past 8 hrs:   BP Temp Temp src Pulse Resp SpO2   12/06/22 1457 (!) 108/59 97.7 °F (36.5 °C) Axillary 72 18 95 %   12/06/22 1135 115/74 97.5 °F (36.4 °C) Axillary 80 18 94 %   12/06/22 0730 129/83 97.5 °F (36.4 °C) Oral 72 17 96 %     No intake/output data recorded. No intake/output data recorded.     Physical Exam:  GENERAL ASSESSMENT: alert, oriented to person, place and time, no acute distress   Chest: easy work of breathing  CVS exam: normal rate, regular rhythm, normal S1, S2  ABDOMEN: soft, non-tender  Neurological exam reveals alert, oriented, normal speech      Data Review   Recent Results (from the past 24 hour(s))   Basic Metabolic Panel w/ Reflex to MG    Collection Time: 12/06/22  7:08 AM   Result Value Ref Range    Sodium 141 133 - 143 mmol/L    Potassium 3.7 3.5 - 5.1 mmol/L    Chloride 111 (H) 101 - 110 mmol/L    CO2 22 21 - 32 mmol/L    Anion Gap 8 2 - 11 mmol/L    Glucose 73 65 - 100 mg/dL    BUN 21 8 - 23 MG/DL    Creatinine 1.30 0.8 - 1.5 MG/DL    Est, Glom Filt Rate 56 (L) >60 ml/min/1.73m2    Calcium 8.4 8.3 - 10.4 MG/DL   CBC with Auto Differential    Collection Time: 12/06/22  7:08 AM   Result Value Ref Range    WBC 7.7 4.3 - 11.1 K/uL    RBC 4.34 4.23 - 5.6 M/uL    Hemoglobin 12.4 (L) 13.6 - 17.2 g/dL    Hematocrit 39.0 (L) 41.1 - 50.3 %    MCV 89.9 82 - 102 FL    MCH 28.6 26.1 - 32.9 PG    MCHC 31.8 31.4 - 35.0 g/dL    RDW 14.7 (H) 11.9 - 14.6 %    Platelets 806 588 - 630 K/uL    MPV 10.4 9.4 - 12.3 FL    nRBC 0.00 0.0 - 0.2 K/uL    Differential Type AUTOMATED      Seg Neutrophils 86 (H) 43 - 78 %    Lymphocytes 5 (L) 13 - 44 %    Monocytes 9 4.0 - 12.0 %    Eosinophils % 0 (L) 0.5 - 7.8 %    Basophils 0 0.0 - 2.0 %    Immature Granulocytes 0 0.0 - 5.0 %    Segs Absolute 6.7 1.7 - 8.2 K/UL    Absolute Lymph # 0.4 (L) 0.5 - 4.6 K/UL    Absolute Mono # 0.7 0.1 - 1.3 K/UL    Absolute Eos # 0.0 0.0 - 0.8 K/UL    Basophils Absolute 0.0 0.0 - 0.2 K/UL    Absolute Immature Granulocyte 0.0 0.0 - 0.5 K/UL       Assessment:     Principal Problem:    Urinary tract infection associated with indwelling urethral catheter (HCC)  Active Problems:    Mild episode of recurrent major depressive disorder (HCC)    Leg weakness, bilateral    Rectal cancer (HCC)    Decreased activities of daily living (ADL)    Hypothyroidism    Colostomy care ([facility] [address]    Chronic indwelling Carr catheter  Resolved Problems:    * No resolved hospital problems. *      73 yo male scheduled for carr/stent exchange with Dr. Naeem Hadley this Thursday. admitted for UTI. Cr is 1.30. WBC 7.7. Afebrile, VSS. Urine culture+ GNR, final culture pending. On cefepime/zosyn. Plan:     Okay to exchange carr catheter (would use 20F coude catheter based on last note). He is scheduled for cystoscopy and stent exchange this Thursday. He will be NPO Thurs 12/8 for procedure. Continue IV abx, follow up cultures. Agree with IV abx prior to surgery Thurs. Signed By: GERMAN Alfred Do, CNP     December 6, 2022      [facility]    Urology Attending Physician Note:     Admit Date: 12/5/2022    Subjective:     Afebrile. Presents due to concern for UTI. Pain controlled. Scheduled for R stent exchange with me on Thursday. Objective:     Patient Vitals for the past 8 hrs:   BP Temp Temp src Pulse Resp SpO2   12/06/22 1457 (!) 108/59 97.7 °F (36.5 °C) Axillary 72 18 95 %   12/06/22 1135 115/74 97.5 °F (36.4 °C) Axillary 80 18 94 %   12/06/22 0730 129/83 97.5 °F (36.4 °C) Oral 72 17 96 %     No intake/output data recorded. No intake/output data recorded.     Physical Exam:  BP (!) 108/59   Pulse 72   Temp 97.7 °F (36.5 °C) (Axillary)   Resp 18   Ht 5' 3\" (1.6 m)   Wt 115 lb (52.2 kg)   SpO2 95%   BMI 20.37 kg/m²      GENERAL: No acute distress, Awake, Alert, Oriented X 3  CARDIAC: regular rate and rhythm  CHEST AND LUNG: Easy work of breathing  ABDOMEN: soft, non tender, non-distended,  : John catheter draining cloudy urine.          Data Review   Recent Results (from the past 24 hour(s))   Basic Metabolic Panel w/ Reflex to MG    Collection Time: 12/06/22  7:08 AM   Result Value Ref Range    Sodium 141 133 - 143 mmol/L    Potassium 3.7 3.5 - 5.1 mmol/L    Chloride 111 (H) 101 - 110 mmol/L    CO2 22 21 - 32 mmol/L    Anion Gap 8 2 - 11 mmol/L    Glucose 73 65 - 100 mg/dL    BUN 21 8 - 23 MG/DL    Creatinine 1.30 0.8 - 1.5 MG/DL    Est, Glom Filt Rate 56 (L) >60 ml/min/1.73m2    Calcium 8.4 8.3 - 10.4 MG/DL   CBC with Auto Differential    Collection Time: 12/06/22  7:08 AM   Result Value Ref Range    WBC 7.7 4.3 - 11.1 K/uL    RBC 4.34 4.23 - 5.6 M/uL    Hemoglobin 12.4 (L) 13.6 - 17.2 g/dL    Hematocrit 39.0 (L) 41.1 - 50.3 %    MCV 89.9 82 - 102 FL    MCH 28.6 26.1 - 32.9 PG    MCHC 31.8 31.4 - 35.0 g/dL    RDW 14.7 (H) 11.9 - 14.6 %    Platelets 164 323 - 764 K/uL    MPV 10.4 9.4 - 12.3 FL    nRBC 0.00 0.0 - 0.2 K/uL    Differential Type AUTOMATED      Seg Neutrophils 86 (H) 43 - 78 %    Lymphocytes 5 (L) 13 - 44 %    Monocytes 9 4.0 - 12.0 %    Eosinophils % 0 (L) 0.5 - 7.8 %    Basophils 0 0.0 - 2.0 %    Immature Granulocytes 0 0.0 - 5.0 %    Segs Absolute 6.7 1.7 - 8.2 K/UL    Absolute Lymph # 0.4 (L) 0.5 - 4.6 K/UL    Absolute Mono # 0.7 0.1 - 1.3 K/UL    Absolute Eos # 0.0 0.0 - 0.8 K/UL    Basophils Absolute 0.0 0.0 - 0.2 K/UL    Absolute Immature Granulocyte 0.0 0.0 - 0.5 K/UL           Assessment:     Principal Problem:    Urinary tract infection associated with indwelling urethral catheter (HCC)  Active Problems:    Mild episode of recurrent major depressive disorder (HCC)    Leg weakness, bilateral    Rectal cancer (HCC)    Decreased activities of daily living (ADL)    Hypothyroidism    Colostomy care (HCC)    Chronic indwelling John catheter  Resolved Problems:    * No resolved hospital problems. *    64 yo male scheduled for carr/stent exchange with Dr. Jahaira Toribio this Thursday. admitted for UTI. Cr is 1.30. WBC 7.7. Afebrile, VSS. Urine culture+ GNR, final culture pending. On cefepime/zosyn. Plan:     -OK to change catheter per primary team (20 Fr coudet recommended). -Admitted to hospitalist for UTI. Agree with broad spectrum antibiotics and narrow as indicated  -Will plan for cystoscopy, stent exchange as scheduled this Thursday while on antibiotics   -Will need to be NPO Wednesday night at midnight.   -Will follow       In addition to my documentation above, I have also reviewed the nurse practitioner note and personally examined the patient. I agree with the HPI, exam, assessment and plan. Jaime Toribio M.D.     [facility]  [place] [address] [address] [facility]  Phone: [phone]  Fax: [phone]

## 2022-12-06 NOTE — PLAN OF CARE
Problem: Discharge Planning  Goal: Discharge to home or other facility with appropriate resources  Outcome: Progressing  Flowsheets  Taken 12/6/2022 1557  Discharge to home or other facility with appropriate resources: Identify barriers to discharge with patient and caregiver  Taken 12/6/2022 1530  Discharge to home or other facility with appropriate resources: Identify barriers to discharge with patient and caregiver     Problem: Safety - Adult  Goal: Free from fall injury  Outcome: Progressing     Problem: ABCDS Injury Assessment  Goal: Absence of physical injury  Outcome: Progressing     Problem: Skin/Tissue Integrity  Goal: Absence of new skin breakdown  Description: 1. Monitor for areas of redness and/or skin breakdown  2. Assess vascular access sites hourly  3. Every 4-6 hours minimum:  Change oxygen saturation probe site  4. Every 4-6 hours:  If on nasal continuous positive airway pressure, respiratory therapy assess nares and determine need for appliance change or resting period.   Outcome: Progressing

## 2022-12-07 ENCOUNTER — APPOINTMENT (OUTPATIENT)
Dept: GENERAL RADIOLOGY | Age: 77
DRG: 660 | End: 2022-12-07
Payer: MEDICARE

## 2022-12-07 LAB
ANION GAP SERPL CALC-SCNC: 2 MMOL/L (ref 2–11)
BUN SERPL-MCNC: 23 MG/DL (ref 8–23)
CALCIUM SERPL-MCNC: 8.5 MG/DL (ref 8.3–10.4)
CHLORIDE SERPL-SCNC: 109 MMOL/L (ref 101–110)
CO2 SERPL-SCNC: 26 MMOL/L (ref 21–32)
CREAT SERPL-MCNC: 1.4 MG/DL (ref 0.8–1.5)
ERYTHROCYTE [DISTWIDTH] IN BLOOD BY AUTOMATED COUNT: 14.9 % (ref 11.9–14.6)
GLUCOSE SERPL-MCNC: 95 MG/DL (ref 65–100)
HCT VFR BLD AUTO: 35.1 % (ref 41.1–50.3)
HGB BLD-MCNC: 11 G/DL (ref 13.6–17.2)
MCH RBC QN AUTO: 28.4 PG (ref 26.1–32.9)
MCHC RBC AUTO-ENTMCNC: 31.3 G/DL (ref 31.4–35)
MCV RBC AUTO: 90.5 FL (ref 82–102)
NRBC # BLD: 0 K/UL (ref 0–0.2)
PLATELET # BLD AUTO: 224 K/UL (ref 150–450)
PMV BLD AUTO: 9.5 FL (ref 9.4–12.3)
POTASSIUM SERPL-SCNC: 3.8 MMOL/L (ref 3.5–5.1)
RBC # BLD AUTO: 3.88 M/UL (ref 4.23–5.6)
SODIUM SERPL-SCNC: 137 MMOL/L (ref 133–143)
WBC # BLD AUTO: 7 K/UL (ref 4.3–11.1)

## 2022-12-07 PROCEDURE — 36415 COLL VENOUS BLD VENIPUNCTURE: CPT

## 2022-12-07 PROCEDURE — 2580000003 HC RX 258: Performed by: INTERNAL MEDICINE

## 2022-12-07 PROCEDURE — 99232 SBSQ HOSP IP/OBS MODERATE 35: CPT | Performed by: UROLOGY

## 2022-12-07 PROCEDURE — 51702 INSERT TEMP BLADDER CATH: CPT

## 2022-12-07 PROCEDURE — 80048 BASIC METABOLIC PNL TOTAL CA: CPT

## 2022-12-07 PROCEDURE — 6370000000 HC RX 637 (ALT 250 FOR IP): Performed by: FAMILY MEDICINE

## 2022-12-07 PROCEDURE — 1100000000 HC RM PRIVATE

## 2022-12-07 PROCEDURE — 94760 N-INVAS EAR/PLS OXIMETRY 1: CPT

## 2022-12-07 PROCEDURE — 6370000000 HC RX 637 (ALT 250 FOR IP): Performed by: PHYSICIAN ASSISTANT

## 2022-12-07 PROCEDURE — 94640 AIRWAY INHALATION TREATMENT: CPT

## 2022-12-07 PROCEDURE — 2500000003 HC RX 250 WO HCPCS: Performed by: INTERNAL MEDICINE

## 2022-12-07 PROCEDURE — 71045 X-RAY EXAM CHEST 1 VIEW: CPT

## 2022-12-07 PROCEDURE — 2580000003 HC RX 258: Performed by: FAMILY MEDICINE

## 2022-12-07 PROCEDURE — 6360000002 HC RX W HCPCS: Performed by: INTERNAL MEDICINE

## 2022-12-07 PROCEDURE — 6370000000 HC RX 637 (ALT 250 FOR IP): Performed by: INTERNAL MEDICINE

## 2022-12-07 PROCEDURE — 85027 COMPLETE CBC AUTOMATED: CPT

## 2022-12-07 PROCEDURE — 6360000002 HC RX W HCPCS: Performed by: PHYSICIAN ASSISTANT

## 2022-12-07 RX ORDER — METHYLPREDNISOLONE SODIUM SUCCINATE 125 MG/2ML
125 INJECTION, POWDER, LYOPHILIZED, FOR SOLUTION INTRAMUSCULAR; INTRAVENOUS ONCE
Status: COMPLETED | OUTPATIENT
Start: 2022-12-07 | End: 2022-12-07

## 2022-12-07 RX ORDER — IPRATROPIUM BROMIDE AND ALBUTEROL SULFATE 2.5; .5 MG/3ML; MG/3ML
1 SOLUTION RESPIRATORY (INHALATION)
Status: DISCONTINUED | OUTPATIENT
Start: 2022-12-07 | End: 2022-12-12 | Stop reason: HOSPADM

## 2022-12-07 RX ORDER — GUAIFENESIN 600 MG/1
600 TABLET, EXTENDED RELEASE ORAL 2 TIMES DAILY
Status: DISCONTINUED | OUTPATIENT
Start: 2022-12-07 | End: 2022-12-12 | Stop reason: HOSPADM

## 2022-12-07 RX ADMIN — PIPERACILLIN AND TAZOBACTAM 3375 MG: 3; .375 INJECTION, POWDER, FOR SOLUTION INTRAVENOUS at 09:06

## 2022-12-07 RX ADMIN — OLANZAPINE 7.5 MG: 2.5 TABLET, FILM COATED ORAL at 22:06

## 2022-12-07 RX ADMIN — METHYLPREDNISOLONE SODIUM SUCCINATE 125 MG: 125 INJECTION, POWDER, FOR SOLUTION INTRAMUSCULAR; INTRAVENOUS at 17:19

## 2022-12-07 RX ADMIN — GUAIFENESIN 600 MG: 600 TABLET ORAL at 22:06

## 2022-12-07 RX ADMIN — IPRATROPIUM BROMIDE AND ALBUTEROL SULFATE 1 AMPULE: .5; 3 SOLUTION RESPIRATORY (INHALATION) at 21:26

## 2022-12-07 RX ADMIN — PIPERACILLIN AND TAZOBACTAM 3375 MG: 3; .375 INJECTION, POWDER, FOR SOLUTION INTRAVENOUS at 16:42

## 2022-12-07 RX ADMIN — SODIUM CHLORIDE, PRESERVATIVE FREE 10 ML: 5 INJECTION INTRAVENOUS at 22:00

## 2022-12-07 RX ADMIN — PIPERACILLIN AND TAZOBACTAM 3375 MG: 3; .375 INJECTION, POWDER, FOR SOLUTION INTRAVENOUS at 02:30

## 2022-12-07 RX ADMIN — LEVOTHYROXINE SODIUM 125 MCG: 0.07 TABLET ORAL at 04:14

## 2022-12-07 RX ADMIN — AMITRIPTYLINE HYDROCHLORIDE 50 MG: 50 TABLET, FILM COATED ORAL at 22:06

## 2022-12-07 RX ADMIN — SODIUM CHLORIDE, PRESERVATIVE FREE 10 ML: 5 INJECTION INTRAVENOUS at 09:06

## 2022-12-07 RX ADMIN — CLONAZEPAM 4 MG: 1 TABLET ORAL at 22:12

## 2022-12-07 RX ADMIN — ENOXAPARIN SODIUM 40 MG: 100 INJECTION SUBCUTANEOUS at 11:14

## 2022-12-07 RX ADMIN — TUBERCULIN PURIFIED PROTEIN DERIVATIVE 5 UNITS: 5 INJECTION, SOLUTION INTRADERMAL at 04:18

## 2022-12-07 RX ADMIN — CARVEDILOL 6.25 MG: 6.25 TABLET, FILM COATED ORAL at 16:36

## 2022-12-07 ASSESSMENT — PAIN SCALES - GENERAL
PAINLEVEL_OUTOF10: 0

## 2022-12-07 NOTE — PROGRESS NOTES
Hospitalist Progress Note   Admit Date:  2022  8:32 AM   Name:  Roxana Nolasco   Age:  76 y.o. Sex:  male  :  1945   MRN:  [id number]   Room:  [facility]    Presenting Complaint: Fatigue     Reason(s) for Admission: Generalized weakness [I61.4]  Complicated UTI (urinary tract infection) [N39.0]  Urinary tract infection associated with indwelling urethral catheter, initial encounter ([facility] [J02.814U, N39.0]     Hospital Course:   Patient is a 76 y.o. male who presented to the ED for cc generalized weakness since this past Friday. Nothing seems to make better or worse. Denies difficulty with vision or lid lag. Pt has a PMHx significant for rectal CA with colostomy no longer on treatment, urostomies related to radiation, due for right stent exchange this month, hypothyroidism, chronic muffled / slurred speech, admission from 10/16-10/27 for lower extremity weakness s/p IVIG, multiple abdominal surgeries with recent SBO (dc'd 2022). In ER UA positive with creatinine 1.60, afebrile. Given acute UTI with hx of complicated UTI (pseudomonas aeruginosa and e faecalis), pt admitted and started on zosyn / vanco.    Subjective & 24hr Events (22): \"I feel better but I'm still not well. \"  Pleasant, tired-appearing thin 77y.o. WM laying in bed admitting to generalized malaise    Denies fever, chills, unexplained wt loss, CP, hematuria.       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 Nela Mendoza 12/9/2022      Hx of rectal cancer / s/p colostomy  - aware; cont colostomy care      Cough / pulmonary hypostasis   - flutter valve, mucinex  - scheduled nebs  - aspiration precautions  - repeat cxray today  - solumedrol 125mg IV now  - check sputum cx for completeness      HTN  - acceptable ranges      Depression  - mood stable      Debility  - appreciate PT/OT notes, reviewed  - recommending STR        Anticipated discharge needs:    - STR    Diet:  ADULT DIET; Regular; Low Fat/Low Chol/High Fiber/2 gm Na  Diet NPO  DVT PPx: lovenox  Code status: Full Code    Hospital Problems:  Principal Problem:    Urinary tract infection associated with indwelling urethral catheter ([address]  Active Problems:    Mild episode of recurrent major depressive disorder (HCC)    Leg weakness, bilateral    Rectal cancer (HCC)    Decreased activities of daily living (ADL)    Hypothyroidism    Colostomy care ([address]    Chronic indwelling John catheter  Resolved Problems:    * No resolved hospital problems. *      Objective:   Patient Vitals for the past 24 hrs:   Temp Pulse Resp BP SpO2   12/07/22 1120 98 °F (36.7 °C) 68 16 99/66 98 %   12/07/22 0717 98 °F (36.7 °C) 73 16 114/71 95 %   12/07/22 0530 97.5 °F (36.4 °C) 85 19 110/89 95 %   12/06/22 2245 97.8 °F (36.6 °C) 73 18 113/71 93 %   12/06/22 1927 97.8 °F (36.6 °C) 68 20 122/76 96 %       Oxygen Therapy  SpO2: 98 %  Pulse via Oximetry: 83 beats per minute  O2 Device: None (Room air)    Estimated body mass index is 19.49 kg/m² as calculated from the following:    Height as of this encounter: 5' 3\" (1.6 m). Weight as of this encounter: 110 lb (49.9 kg).     Intake/Output Summary (Last 24 hours) at 12/7/2022 1617  Last data filed at 12/7/2022 0736  Gross per 24 hour   Intake --   Output 1375 ml   Net -1375 ml Physical Exam:     Blood pressure 99/66, pulse 68, temperature 98 °F (36.7 °C), temperature source Oral, resp. rate 16, height 5' 3\" (1.6 m), weight 110 lb (49.9 kg), SpO2 98 %. General:    Thin WM in NAD  Head:  Normocephalic, atraumatic  Eyes:  Sclerae appear normal.  Pupils equally round. ENT:  Nares appear normal, no drainage. Moist oral mucosa  Neck:  No restricted ROM. Trachea midline   CV:   RRR. No m/r/g. No jugular venous distension. Lungs:   Rhonchi b/l; no wheezing / accessory muscles used  Abdomen:   Colostomy in place, brown fecal matters noted; +BSx4  Extremities: No cyanosis or clubbing. No edema  Skin:     No rashes and normal coloration. Warm and dry. Neuro:  Chronic garbled speech; no other focal neuro deficits  Psych:  Normal mood and affect.       I have personally reviewed labs and tests showing:  Recent Labs:  Recent Results (from the past 48 hour(s))   Basic Metabolic Panel w/ Reflex to MG    Collection Time: 12/06/22  7:08 AM   Result Value Ref Range    Sodium 141 133 - 143 mmol/L    Potassium 3.7 3.5 - 5.1 mmol/L    Chloride 111 (H) 101 - 110 mmol/L    CO2 22 21 - 32 mmol/L    Anion Gap 8 2 - 11 mmol/L    Glucose 73 65 - 100 mg/dL    BUN 21 8 - 23 MG/DL    Creatinine 1.30 0.8 - 1.5 MG/DL    Est, Glom Filt Rate 56 (L) >60 ml/min/1.73m2    Calcium 8.4 8.3 - 10.4 MG/DL   CBC with Auto Differential    Collection Time: 12/06/22  7:08 AM   Result Value Ref Range    WBC 7.7 4.3 - 11.1 K/uL    RBC 4.34 4.23 - 5.6 M/uL    Hemoglobin 12.4 (L) 13.6 - 17.2 g/dL    Hematocrit 39.0 (L) 41.1 - 50.3 %    MCV 89.9 82 - 102 FL    MCH 28.6 26.1 - 32.9 PG    MCHC 31.8 31.4 - 35.0 g/dL    RDW 14.7 (H) 11.9 - 14.6 %    Platelets 799 521 - 542 K/uL    MPV 10.4 9.4 - 12.3 FL    nRBC 0.00 0.0 - 0.2 K/uL    Differential Type AUTOMATED      Seg Neutrophils 86 (H) 43 - 78 %    Lymphocytes 5 (L) 13 - 44 %    Monocytes 9 4.0 - 12.0 %    Eosinophils % 0 (L) 0.5 - 7.8 %    Basophils 0 0.0 - 2.0 % Immature Granulocytes 0 0.0 - 5.0 %    Segs Absolute 6.7 1.7 - 8.2 K/UL    Absolute Lymph # 0.4 (L) 0.5 - 4.6 K/UL    Absolute Mono # 0.7 0.1 - 1.3 K/UL    Absolute Eos # 0.0 0.0 - 0.8 K/UL    Basophils Absolute 0.0 0.0 - 0.2 K/UL    Absolute Immature Granulocyte 0.0 0.0 - 0.5 K/UL   Basic Metabolic Panel w/ Reflex to MG    Collection Time: 12/07/22  6:29 AM   Result Value Ref Range    Sodium 137 133 - 143 mmol/L    Potassium 3.8 3.5 - 5.1 mmol/L    Chloride 109 101 - 110 mmol/L    CO2 26 21 - 32 mmol/L    Anion Gap 2 2 - 11 mmol/L    Glucose 95 65 - 100 mg/dL    BUN 23 8 - 23 MG/DL    Creatinine 1.40 0.8 - 1.5 MG/DL    Est, Glom Filt Rate 51 (L) >60 ml/min/1.73m2    Calcium 8.5 8.3 - 10.4 MG/DL   CBC    Collection Time: 12/07/22  6:29 AM   Result Value Ref Range    WBC 7.0 4.3 - 11.1 K/uL    RBC 3.88 (L) 4.23 - 5.6 M/uL    Hemoglobin 11.0 (L) 13.6 - 17.2 g/dL    Hematocrit 35.1 (L) 41.1 - 50.3 %    MCV 90.5 82 - 102 FL    MCH 28.4 26.1 - 32.9 PG    MCHC 31.3 (L) 31.4 - 35.0 g/dL    RDW 14.9 (H) 11.9 - 14.6 %    Platelets 531 492 - 396 K/uL    MPV 9.5 9.4 - 12.3 FL    nRBC 0.00 0.0 - 0.2 K/uL       I have personally reviewed imaging studies showing: Other Studies:  XR CHEST PORTABLE   Final Result   1. No acute abnormality in the chest.   2. No acute abdominal or pelvic abnormality. Interval decrease in the small   bowel dilation. XR ABDOMEN (KUB) (SINGLE AP VIEW)   Final Result   1. No acute abnormality in the chest.   2. No acute abdominal or pelvic abnormality. Interval decrease in the small   bowel dilation.           Current Meds:  Current Facility-Administered Medications   Medication Dose Route Frequency    tuberculin injection 5 Units  5 Units IntraDERmal Once    polyethylene glycol (GLYCOLAX) packet 17 g  17 g Oral Daily PRN    levothyroxine (SYNTHROID) tablet 125 mcg  125 mcg Oral Daily    piperacillin-tazobactam (ZOSYN) 3,375 mg in sodium chloride 0.9 % 50 mL IVPB (mini-bag)  3,375 mg IntraVENous Q8H enoxaparin (LOVENOX) injection 40 mg  40 mg SubCUTAneous Q24H    lidocaine (XYLOCAINE) 2 % uro-jet   Topical PRN    clonazePAM (KLONOPIN) tablet 4 mg  4 mg Oral QHS PRN    OLANZapine (ZYPREXA) tablet 7.5 mg  7.5 mg Oral Nightly    amitriptyline (ELAVIL) tablet 50 mg  50 mg Oral Nightly    carvedilol (COREG) tablet 6.25 mg  6.25 mg Oral BID WC    [Held by provider] OLANZapine (ZYPREXA) tablet 7.5 mg  7.5 mg Oral Nightly    clonazePAM (KLONOPIN) tablet 2 mg  2 mg Oral Q8H PRN    sodium chloride flush 0.9 % injection 5-40 mL  5-40 mL IntraVENous 2 times per day    sodium chloride flush 0.9 % injection 5-40 mL  5-40 mL IntraVENous PRN    0.9 % sodium chloride infusion   IntraVENous PRN    acetaminophen (TYLENOL) tablet 650 mg  650 mg Oral Q6H PRN    Or    acetaminophen (TYLENOL) suppository 650 mg  650 mg Rectal Q6H PRN       Signed:  GENIA Joya    Part of this note may have been written by using a voice dictation software. The note has been proof read but may still contain some grammatical/other typographical errors.

## 2022-12-07 NOTE — FLOWSHEET NOTE
12/07/22 0717   Vital Signs   Temp 98 °F (36.7 °C)   Temp Source Axillary   Heart Rate 73   Heart Rate Source Monitor   Resp 16   /71   MAP (Calculated) 85   BP Location Left upper arm   BP Method Automatic   Patient Position Supine   Patient scheduled for carvedilol this morning will call attending to verify if patient to should receive A.M dose.

## 2022-12-07 NOTE — CARE COORDINATION
Chart reviewed by . Patient known to CM from previous care. Patient readmitted through ED with UTI and generalized weakness. CM met with patient to discuss discharge planning. No changes in demographics reported. Patient lives alone and is independent at baseline. Patient evaluated by therapy and recommended STR. Patient recently discharged to [facility] on 11/18 and would like to return to this facility if bed offer can be extended this hospitalization. Referral sent via [facility]. CM to f/u. No additional discharge needs noted at this time. CM continues to monitor. 12/07/22 8411   Service Assessment   Patient Orientation Alert and Oriented;Person;Place;Situation   Cognition Alert   History Provided By Patient;Medical Record   Primary Caregiver Self   Support Systems Friends/Neighbors   Patient's Healthcare Decision Maker is: Named in [address] [place] [address]   PCP Verified by CM Yes   Last Visit to PCP Within last year   Prior Functional Level Independent in ADLs/IADLs  (Independent at baseline.)   Current Functional Level   (pending clinical course.)   Can patient return to prior living arrangement No   Ability to make needs known: Good   Family able to assist with home care needs: No   Financial Resources Medicare   Social/Functional History   Lives With Alone   Type of Home   ([facility])   Home Layout Two level   Home Equipment   (Denies DME use.)   Active  Yes   Mode of Transportation Car   Occupation Retired   Discharge Planning   Type of Residence Other (Comment)  ([facility])   [facility]   DME Ordered? No   Potential Assistance Purchasing Medications No   Patient expects to be discharged to: [address] Dis[facility]   Transition of Care Consult (CM Consult) Discharge [address] Provided? No   Mode of Transport at Discharge Other (see comment)  [facility]. ) Condition of Participation: Discharge Planning   The Plan for Transition of Care is related to the following treatment goals: obtain STR.

## 2022-12-07 NOTE — PROGRESS NOTES
End of shift summary       Neuro check [id number] completed. Noted garble speech, patient states d/t dry mouth Amitriptyline, N.P aware  John draining florentin in color   Colostomy bag patent,Bag exchanges will be completed by patient 12/08/2022  Carvedilol held noon dose   Npo after midnight starting 12/09/2022 for stent exchange 12/09/2022  Chest xray completed.

## 2022-12-07 NOTE — PROGRESS NOTES
Admit Date: 12/5/2022      Subjective:     Jose Francisco Nolasco is resting. He is afebrile, VSS.       Objective:     Patient Vitals for the past 8 hrs:   BP Temp Temp src Pulse Resp SpO2   12/07/22 1120 99/66 98 °F (36.7 °C) Oral 68 16 98 %   12/07/22 0717 114/71 98 °F (36.7 °C) Axillary 73 16 95 %     12/07 0701 - 12/07 1900  In: -   Out: 175 [Urine:175]  12/05 1901 - 12/07 0700  In: -   Out: 1200 [Urine:1100]    Physical Exam:  GENERAL ASSESSMENT: alert, oriented to person, place and time, no acute distress  Chest: easy work of breathing  CVS exam: normal rate, regular rhythm, normal S1, S2  Neurological exam reveals alert, oriented, normal speech    Data Review   Recent Results (from the past 24 hour(s))   Basic Metabolic Panel w/ Reflex to MG    Collection Time: 12/07/22  6:29 AM   Result Value Ref Range    Sodium 137 133 - 143 mmol/L    Potassium 3.8 3.5 - 5.1 mmol/L    Chloride 109 101 - 110 mmol/L    CO2 26 21 - 32 mmol/L    Anion Gap 2 2 - 11 mmol/L    Glucose 95 65 - 100 mg/dL    BUN 23 8 - 23 MG/DL    Creatinine 1.40 0.8 - 1.5 MG/DL    Est, Glom Filt Rate 51 (L) >60 ml/min/1.73m2    Calcium 8.5 8.3 - 10.4 MG/DL   CBC    Collection Time: 12/07/22  6:29 AM   Result Value Ref Range    WBC 7.0 4.3 - 11.1 K/uL    RBC 3.88 (L) 4.23 - 5.6 M/uL    Hemoglobin 11.0 (L) 13.6 - 17.2 g/dL    Hematocrit 35.1 (L) 41.1 - 50.3 %    MCV 90.5 82 - 102 FL    MCH 28.4 26.1 - 32.9 PG    MCHC 31.3 (L) 31.4 - 35.0 g/dL    RDW 14.9 (H) 11.9 - 14.6 %    Platelets 141 785 - 153 K/uL    MPV 9.5 9.4 - 12.3 FL    nRBC 0.00 0.0 - 0.2 K/uL       Assessment:     Principal Problem:    Urinary tract infection associated with indwelling urethral catheter (HCC)  Active Problems:    Mild episode of recurrent major depressive disorder (HCC)    Leg weakness, bilateral    Rectal cancer (HCC)    Decreased activities of daily living (ADL)    Hypothyroidism    Colostomy care (HCC)    Chronic indwelling John catheter  Resolved Problems:    * No resolved hospital problems. *      71 yo male scheduled for upcoming carr/stent exchange with Dr. Karol Cordova this week. admitted for UTI. Cr is 1.30. WBC 7.7. Afebrile, VSS. Urine culture+ GNR, final culture pending. On cefepime/zosyn. Carr exchanged today. Urine is blood     Cultures pending  Cr 1.40  WBC 7.0    Plan:     Continue carr catheter. He is scheduled for cystoscopy and stent exchange Friday, 12/9. He will be NPO Friday 12/9 for procedure. Continue IV abx, follow up cultures. Agree with IV abx prior to surgery Fri. Signed By: GERMAN Foley - JORDI     December 7, 2022      [facility]    Urology Attending Physician Note:     Admit Date: 12/5/2022    Subjective:     Afebrile. No complaints today. Catheter with hematuria since exchange yesterday. Objective:     Patient Vitals for the past 8 hrs:   BP Temp Temp src Pulse Resp SpO2   12/07/22 1626 110/72 98.1 °F (36.7 °C) Oral 78 18 94 %   12/07/22 1120 99/66 98 °F (36.7 °C) Oral 68 16 98 %     12/07 0701 - 12/07 1900  In: -   Out: 175 [Urine:175]  12/05 1901 - 12/07 0700  In: -   Out: 1200 [Urine:1100]    Physical Exam:   /72   Pulse 78   Temp 98.1 °F (36.7 °C) (Oral)   Resp 18   Ht 5' 3\" (1.6 m)   Wt 110 lb (49.9 kg)   SpO2 94%   BMI 19.49 kg/m²      GENERAL: No acute distress, Awake, Alert, Oriented X 3, Gait normal  CARDIAC: regular rate and rhythm  CHEST AND LUNG: Easy work of breathing, clear to auscultation bilaterally, no cyanosis  ABDOMEN: soft, non tender, non-distended, positive bowel sounds, no organomegaly, no palpable masses, no guarding, no rebound tenderness  : Carr draining clear pink urine no clots.            Data Review   Recent Results (from the past 24 hour(s))   Basic Metabolic Panel w/ Reflex to MG    Collection Time: 12/07/22  6:29 AM   Result Value Ref Range    Sodium 137 133 - 143 mmol/L    Potassium 3.8 3.5 - 5.1 mmol/L    Chloride 109 101 - 110 mmol/L    CO2 26 21 - 32 mmol/L    Anion Gap 2 2 - 11 mmol/L    Glucose 95 65 - 100 mg/dL    BUN 23 8 - 23 MG/DL    Creatinine 1.40 0.8 - 1.5 MG/DL    Est, Glom Filt Rate 51 (L) >60 ml/min/1.73m2    Calcium 8.5 8.3 - 10.4 MG/DL   CBC    Collection Time: 12/07/22  6:29 AM   Result Value Ref Range    WBC 7.0 4.3 - 11.1 K/uL    RBC 3.88 (L) 4.23 - 5.6 M/uL    Hemoglobin 11.0 (L) 13.6 - 17.2 g/dL    Hematocrit 35.1 (L) 41.1 - 50.3 %    MCV 90.5 82 - 102 FL    MCH 28.4 26.1 - 32.9 PG    MCHC 31.3 (L) 31.4 - 35.0 g/dL    RDW 14.9 (H) 11.9 - 14.6 %    Platelets 608 541 - 574 K/uL    MPV 9.5 9.4 - 12.3 FL    nRBC 0.00 0.0 - 0.2 K/uL           Assessment:     Principal Problem:    Urinary tract infection associated with indwelling urethral catheter (HCC)  Active Problems:    Mild episode of recurrent major depressive disorder (HCC)    Leg weakness, bilateral    Rectal cancer (HCC)    Decreased activities of daily living (ADL)    Hypothyroidism    Colostomy care (HCC)    Chronic indwelling Carr catheter  Resolved Problems:    * No resolved hospital problems. *    62 yo male scheduled for upcoming carr/stent exchange with Dr. Karol Cordova this week. admitted for UTI. Cr is 1.30. WBC 7.7. Afebrile, VSS. Urine culture+ GNR, final culture pending. On cefepime/zosyn. Carr exchanged today. Urine is blood      Cultures pending  Cr 1.40  WBC 7.0    Plan:     -Continue carr and antibiotics  -Follow up culture  -NPO at midnight tomorrow night  -OR 12/9 Friday for cystoscopy, R ureteral stent exchange      In addition to my documentation above, I have also reviewed the nurse practitioner note and personally examined the patient. I agree with the HPI, exam, assessment and plan. Jaime Cordova M.D.     [facility]  [address], [address] [facility]  Phone: [phone]  Fax: [phone]

## 2022-12-08 ENCOUNTER — ANESTHESIA EVENT (OUTPATIENT)
Dept: SURGERY | Age: 77
End: 2022-12-08
Payer: MEDICARE

## 2022-12-08 LAB
ANION GAP SERPL CALC-SCNC: 6 MMOL/L (ref 2–11)
B PERT DNA SPEC QL NAA+PROBE: NOT DETECTED
BORDETELLA PARAPERTUSSIS BY PCR: NOT DETECTED
BUN SERPL-MCNC: 24 MG/DL (ref 8–23)
C PNEUM DNA SPEC QL NAA+PROBE: NOT DETECTED
CALCIUM SERPL-MCNC: 8.5 MG/DL (ref 8.3–10.4)
CHLORIDE SERPL-SCNC: 106 MMOL/L (ref 101–110)
CO2 SERPL-SCNC: 25 MMOL/L (ref 21–32)
CREAT SERPL-MCNC: 1.5 MG/DL (ref 0.8–1.5)
ERYTHROCYTE [DISTWIDTH] IN BLOOD BY AUTOMATED COUNT: 14.7 % (ref 11.9–14.6)
FLUAV SUBTYP SPEC NAA+PROBE: NOT DETECTED
FLUBV RNA SPEC QL NAA+PROBE: NOT DETECTED
GLUCOSE SERPL-MCNC: 152 MG/DL (ref 65–100)
HADV DNA SPEC QL NAA+PROBE: NOT DETECTED
HCOV 229E RNA SPEC QL NAA+PROBE: NOT DETECTED
HCOV HKU1 RNA SPEC QL NAA+PROBE: NOT DETECTED
HCOV NL63 RNA SPEC QL NAA+PROBE: NOT DETECTED
HCOV OC43 RNA SPEC QL NAA+PROBE: NOT DETECTED
HCT VFR BLD AUTO: 35.5 % (ref 41.1–50.3)
HGB BLD-MCNC: 11.2 G/DL (ref 13.6–17.2)
HMPV RNA SPEC QL NAA+PROBE: NOT DETECTED
HPIV1 RNA SPEC QL NAA+PROBE: DETECTED
HPIV2 RNA SPEC QL NAA+PROBE: NOT DETECTED
HPIV3 RNA SPEC QL NAA+PROBE: NOT DETECTED
HPIV4 RNA SPEC QL NAA+PROBE: NOT DETECTED
M PNEUMO DNA SPEC QL NAA+PROBE: NOT DETECTED
MCH RBC QN AUTO: 28.6 PG (ref 26.1–32.9)
MCHC RBC AUTO-ENTMCNC: 31.5 G/DL (ref 31.4–35)
MCV RBC AUTO: 90.8 FL (ref 82–102)
MM INDURATION, POC: 0 MM (ref 0–5)
NRBC # BLD: 0 K/UL (ref 0–0.2)
PLATELET # BLD AUTO: 238 K/UL (ref 150–450)
PMV BLD AUTO: 10.1 FL (ref 9.4–12.3)
POTASSIUM SERPL-SCNC: 4.3 MMOL/L (ref 3.5–5.1)
PPD, POC: NEGATIVE
RBC # BLD AUTO: 3.91 M/UL (ref 4.23–5.6)
RSV RNA SPEC QL NAA+PROBE: NOT DETECTED
RV+EV RNA SPEC QL NAA+PROBE: NOT DETECTED
SARS-COV-2 RNA RESP QL NAA+PROBE: NOT DETECTED
SODIUM SERPL-SCNC: 137 MMOL/L (ref 133–143)
WBC # BLD AUTO: 7.1 K/UL (ref 4.3–11.1)

## 2022-12-08 PROCEDURE — 2580000003 HC RX 258: Performed by: INTERNAL MEDICINE

## 2022-12-08 PROCEDURE — 97535 SELF CARE MNGMENT TRAINING: CPT

## 2022-12-08 PROCEDURE — 6370000000 HC RX 637 (ALT 250 FOR IP): Performed by: PHYSICIAN ASSISTANT

## 2022-12-08 PROCEDURE — 0202U NFCT DS 22 TRGT SARS-COV-2: CPT

## 2022-12-08 PROCEDURE — 51702 INSERT TEMP BLADDER CATH: CPT

## 2022-12-08 PROCEDURE — 36415 COLL VENOUS BLD VENIPUNCTURE: CPT

## 2022-12-08 PROCEDURE — 97110 THERAPEUTIC EXERCISES: CPT

## 2022-12-08 PROCEDURE — 6370000000 HC RX 637 (ALT 250 FOR IP): Performed by: INTERNAL MEDICINE

## 2022-12-08 PROCEDURE — 80048 BASIC METABOLIC PNL TOTAL CA: CPT

## 2022-12-08 PROCEDURE — 94640 AIRWAY INHALATION TREATMENT: CPT

## 2022-12-08 PROCEDURE — 97530 THERAPEUTIC ACTIVITIES: CPT

## 2022-12-08 PROCEDURE — 85027 COMPLETE CBC AUTOMATED: CPT

## 2022-12-08 PROCEDURE — 94761 N-INVAS EAR/PLS OXIMETRY MLT: CPT

## 2022-12-08 PROCEDURE — 1100000000 HC RM PRIVATE

## 2022-12-08 PROCEDURE — 2580000003 HC RX 258: Performed by: FAMILY MEDICINE

## 2022-12-08 PROCEDURE — 6370000000 HC RX 637 (ALT 250 FOR IP): Performed by: FAMILY MEDICINE

## 2022-12-08 PROCEDURE — 6360000002 HC RX W HCPCS: Performed by: INTERNAL MEDICINE

## 2022-12-08 PROCEDURE — 94760 N-INVAS EAR/PLS OXIMETRY 1: CPT

## 2022-12-08 RX ADMIN — GUAIFENESIN 600 MG: 600 TABLET ORAL at 21:59

## 2022-12-08 RX ADMIN — SODIUM CHLORIDE, PRESERVATIVE FREE 10 ML: 5 INJECTION INTRAVENOUS at 22:00

## 2022-12-08 RX ADMIN — CLONAZEPAM 4 MG: 1 TABLET ORAL at 22:05

## 2022-12-08 RX ADMIN — CARVEDILOL 6.25 MG: 6.25 TABLET, FILM COATED ORAL at 17:38

## 2022-12-08 RX ADMIN — IPRATROPIUM BROMIDE AND ALBUTEROL SULFATE 1 AMPULE: .5; 3 SOLUTION RESPIRATORY (INHALATION) at 12:06

## 2022-12-08 RX ADMIN — SODIUM CHLORIDE, PRESERVATIVE FREE 10 ML: 5 INJECTION INTRAVENOUS at 08:50

## 2022-12-08 RX ADMIN — PIPERACILLIN AND TAZOBACTAM 3375 MG: 3; .375 INJECTION, POWDER, FOR SOLUTION INTRAVENOUS at 18:39

## 2022-12-08 RX ADMIN — OLANZAPINE 7.5 MG: 2.5 TABLET, FILM COATED ORAL at 21:59

## 2022-12-08 RX ADMIN — IPRATROPIUM BROMIDE AND ALBUTEROL SULFATE 1 AMPULE: .5; 3 SOLUTION RESPIRATORY (INHALATION) at 15:41

## 2022-12-08 RX ADMIN — CARVEDILOL 6.25 MG: 6.25 TABLET, FILM COATED ORAL at 08:46

## 2022-12-08 RX ADMIN — IPRATROPIUM BROMIDE AND ALBUTEROL SULFATE 1 AMPULE: .5; 3 SOLUTION RESPIRATORY (INHALATION) at 20:31

## 2022-12-08 RX ADMIN — IPRATROPIUM BROMIDE AND ALBUTEROL SULFATE 1 AMPULE: .5; 3 SOLUTION RESPIRATORY (INHALATION) at 08:23

## 2022-12-08 RX ADMIN — PIPERACILLIN AND TAZOBACTAM 3375 MG: 3; .375 INJECTION, POWDER, FOR SOLUTION INTRAVENOUS at 02:00

## 2022-12-08 RX ADMIN — PIPERACILLIN AND TAZOBACTAM 3375 MG: 3; .375 INJECTION, POWDER, FOR SOLUTION INTRAVENOUS at 10:37

## 2022-12-08 RX ADMIN — AMITRIPTYLINE HYDROCHLORIDE 50 MG: 50 TABLET, FILM COATED ORAL at 21:59

## 2022-12-08 RX ADMIN — ENOXAPARIN SODIUM 40 MG: 100 INJECTION SUBCUTANEOUS at 10:37

## 2022-12-08 RX ADMIN — LEVOTHYROXINE SODIUM 125 MCG: 0.07 TABLET ORAL at 06:30

## 2022-12-08 RX ADMIN — GUAIFENESIN 600 MG: 600 TABLET ORAL at 08:46

## 2022-12-08 ASSESSMENT — PAIN SCALES - GENERAL: PAINLEVEL_OUTOF10: 0

## 2022-12-08 NOTE — PROGRESS NOTES
ACUTE PHYSICAL THERAPY GOALS:   (Developed with and agreed upon by patient and/or caregiver.)    ST. Patient will perform bed mobility with SUPERVISION within 3 days. 2. Patient will transfer bed to chair with CONTACT GUARD ASSISTANCE within 3 days. 3. Patient will demonstrate FAIR DYNAMIC STANDING balance within 3 day(s). 4. Patient will ambulate 25ft+ using least restrictive assistive device and MINIMAL ASSISTANCE within 3 days. 5. Patient will tolerate 15+ minutes of therapeutic activity/exercise and/or neuromuscular re-education while maintaining stable vitals to improve functional strength and activity tolerance within 3 days. LT. Patient will perform bed mobility with INDEPENDENCE within 7 days. 2. Patient will transfer bed to chair with STAND BY ASSISTANCE within 7 days. 3. Patient will demonstrate FAIR+ DYNAMIC STANDING balance within 7 day(s). 4. Patient will ambulate 75ft+ using least restrictive assistive device and CONTACT GUARD ASSISTANCE within 7 days. 5. Patient will tolerate 25+ minutes of therapeutic activity/exercise and/or neuromuscular re-education while maintaining stable vitals to improve functional strength and activity tolerance within 7 days.     PHYSICAL THERAPY: Daily Note AM   (Link to Caseload Tracking: PT Visit Days : 2  Time In/Out PT Charge Capture  Rehab Caseload Tracker  Orders    Uzma Brooks is a 76 y.o. male   PRIMARY DIAGNOSIS: Urinary tract infection associated with indwelling urethral catheter (HCC)  Generalized weakness [F14.1]  Complicated UTI (urinary tract infection) [N39.0]  Urinary tract infection associated with indwelling urethral catheter, initial encounter ([facility] [X08.226J, N39.0]       Inpatient: Payor: MEDICARE / Plan: MEDICARE PART A AND B / Product Type: *No Product type* /     ASSESSMENT:     REHAB RECOMMENDATIONS:   Recommendation to date pending progress:  Setting:  Short-term Rehab    Equipment:    To Be Determined ASSESSMENT:  Mr. Theressa Severe was received supine in bed, requesting to get up and move. He sat at EOB with CGA. He was able to ambulate 100 ft x 2 and transfer to chair with RW and CGA-Donny. Pt with posterior lean, decreased balance and step length. He then participated in exercises for strengthening, tolerated well. Pt was impulsive throughout session and needed frequent cueing for safety. Pt upset about placement of chair alarm, not being able to get up by himself, explained how this is related to keeping him safe, preventing falls. Good progress, will continue to follow. SUBJECTIVE:   Mr. Theressa Severe states, \"Is that an alarm?  I don't need that\"     Social/Functional Lives With: Alone  Type of Home:  ([facility])  Home Layout: Two level  Home Equipment:  (Denies DME use.)  ADL Assistance:  (Reports independent since recent discharge home from [address] [place] [address])  Ambulation Assistance:  (Reports independent without utilizing an assistive device since recent discharge home from [address] [place] [address])  Transfer Assistance:  (Reports independent without utilizing an assistive device since recent discharge home from [address] [place] [address])  Active : Yes  Mode of Transportation: Car  Occupation: Retired  OBJECTIVE:     PAIN: VITALS / O2: PRECAUTION / Nathalie Sutherland / Alyce Shrestha:   Pre Treatment:   Pain Assessment: None - Denies Pain      Post Treatment: 0 Vitals        Oxygen    John Catheter and IV    RESTRICTIONS/PRECAUTIONS:  Restrictions/Precautions  Restrictions/Precautions: Fall Risk  Restrictions/Precautions: Fall Risk     MOBILITY: I Mod I S SBA CGA Min Mod Max Total  NT x2 Comments:   Bed Mobility    Rolling [] [] [] [] [x] [] [] [] [] [] []    Supine to Sit [] [] [] [] [x] [] [] [] [] [] []    Scooting [] [] [] [] [x] [] [] [] [] [] []    Sit to Supine [] [] [] [] [] [] [] [] [] [x] []    Transfers    Sit to Stand [] [] [] [] [x] [] [] [] [] [] []    Bed to Chair [] [] [] [] [x] [x] [] [] [] [] []    Stand to Sit [] [] [] [] [x] [] [] [] [] [] []     [] [] [] [] [] [] [] [] [] [] []    I=Independent, Mod I=Modified Independent, S=Supervision, SBA=Standby Assistance, CGA=Contact Guard Assistance,   Min=Minimal Assistance, Mod=Moderate Assistance, Max=Maximal Assistance, Total=Total Assistance, NT=Not Tested    BALANCE: Good Fair+ Fair Fair- Poor NT Comments   Sitting Static [x] [] [] [] [] []    Sitting Dynamic [] [x] [] [] [] []              Standing Static [] [x] [] [] [] []    Standing Dynamic [] [] [x] [] [] []      GAIT: I Mod I S SBA CGA Min Mod Max Total  NT x2 Comments:   Level of Assistance [] [] [] [] [x] [x] [] [] [] [] []    Distance 100 feet x 2    DME Rolling Walker    Gait Quality Decreased zoe , Decreased step length, and Trunk sway increased    Weightbearing Status      Stairs      I=Independent, Mod I=Modified Independent, S=Supervision, SBA=Standby Assistance, CGA=Contact Guard Assistance,   Min=Minimal Assistance, Mod=Moderate Assistance, Max=Maximal Assistance, Total=Total Assistance, NT=Not Tested    PLAN:   FREQUENCY AND DURATION: 3 times/week for duration of hospital stay or until stated goals are met, whichever comes first.    TREATMENT:   TREATMENT:   Therapeutic Activity (20 Minutes): Therapeutic activity included Rolling, Supine to Sit, Scooting, Transfer Training, Ambulation on level ground, Sitting balance , and Standing balance to improve functional Activity tolerance, Balance, Mobility, and Strength. Therapeutic Exercise (15 Minutes): Therapeutic exercises noted below to improve functional activity tolerance, AROM, strength, and mobility.      TREATMENT GRID:   Date:  12/8/22 Date:   Date:     Activity/Exercise Parameters Parameters Parameters   Seated Marching 3 x 10 B     LAQs 3 x 10 B     Ankle PF/DF 3 x 10 B     STS 2 x 5                           AFTER TREATMENT PRECAUTIONS: Alarm Activated, Bed/Chair Locked, Call light within reach, Chair, Needs within reach, and RN notified    INTERDISCIPLINARY COLLABORATION:  RN/ PCT    EDUCATION: Education Given To: Patient  Education Provided: Role of Therapy;Plan of Care; Fall Prevention Strategies; Equipment;Transfer Training;Home Exercise Program  Education Method: Verbal  Barriers to Learning: Cognition  Education Outcome: Verbalized understanding;Continued education needed    TIME IN/OUT:  Time In: 0907  Time Out: 0942  Minutes: Lenin REINOSO PT

## 2022-12-08 NOTE — PROGRESS NOTES
ACUTE OCCUPATIONAL THERAPY GOALS:   (Developed with and agreed upon by patient and/or caregiver.)  1) Patient will complete lower body bathing and dressing with mod I and adaptive equipment as needed. 2) Patient will complete toileting with mod i. 3) Patient will complete functional transfers with mod I and adaptive equipment as needed. 4) Patient will tolerate at least 15 minutes of OT activity with as needed rest breaks while maintaining O2 sats >90%. 5) Patient will verbalize at least 3 energy conservation technique to utilize during ADL/IADL. 6) Patient will complete grooming tasks in standing at sink level with mod I. Timeframe: 7 visits      OCCUPATIONAL THERAPY: Daily Note PM   OT Visit Days: 2   Time In/Out  OT Charge Capture  Rehab Caseload Tracker  OT Orders    Leti Cruz is a 76 y.o. male   PRIMARY DIAGNOSIS: Urinary tract infection associated with indwelling urethral catheter (HCC)  Generalized weakness [S19.5]  Complicated UTI (urinary tract infection) [N39.0]  Urinary tract infection associated with indwelling urethral catheter, initial encounter ([facility] [T83.511A, N39.0]       Inpatient: Payor: MEDICARE / Plan: MEDICARE PART A AND B / Product Type: *No Product type* /     ASSESSMENT:     REHAB RECOMMENDATIONS: CURRENT LEVEL OF FUNCTION:  (Most Recently Demonstrated)   Recommendation to date pending progress:  Setting:  Short-term Rehab    Equipment:    To Be Determined Bathing:  Not Tested  Dressing:  Minimal Assist  Feeding/Grooming:  Contact Guard Assist  Toileting:  Contact Guard Assist  Functional Mobility:  Minimal Assist     ASSESSMENT:  Mr. Melissa Lira is eager to get up and move during his hospitalization. Pt report hx of multiple hospitalizations in the past and vocalizes the awareness of the \"need to walk. \" Pt further educated during session how ADL, sitting up in the chair, and standing activity can also help to increase strength.  Pt noted to rest legs in external rotation with knees flexed in bed with pt educated on hip adduction exercise with pillow and pt appears receptive. Pt donned socks edge of bed with minimal assistance and completed functional mobility in the room and hallway using a RW and CGA/Min A. Pt encouraged to extend at B LE. Pt went into bathroom with min HHA and lowered to his knees by the toilet to then empty his  colostomy bag and clean it with additional time. Pt required minimal assistance to stand from tall kneeling position. Pt stood sink side and washed hands with CGA. Pt assisted back to supine in bed and was educated on need to call staff to assist when out of bed with pt verbalizing understanding. Pt to continue per plan of care. SUBJECTIVE:     Mr. Sharona Chávez states, \"I want to ask you about something! \"     Social/Functional Lives With: Alone  Type of Home:  ([facility])  Home Layout: Two level  Home Equipment:  (Denies DME use.)  ADL Assistance:  (Reports independent since recent discharge home from [address] [place] [address])  Ambulation Assistance:  (Reports independent without utilizing an assistive device since recent discharge home from [address] [place] [address])  Transfer Assistance:  (Reports independent without utilizing an assistive device since recent discharge home from [address] [place] [address])  Active : Yes  Mode of Transportation: Car  Occupation: Retired    OBJECTIVE:     KATHRIN / Maria Fernanda Cool / [place]anga: IV and suprapubic catheter    RESTRICTIONS/PRECAUTIONS:  Restrictions/Precautions  Restrictions/Precautions: Fall Risk        PAIN: VITALS / O2:   Pre Treatment:   Pain Assessment: None - Denies Pain        Post Treatment: same  Vitals          Oxygen        MOBILITY: I Mod I S SBA CGA Min Mod Max Total  NT x2 Comments:   Bed Mobility    Rolling [] [] [] [] [x] [] [] [] [] [] []    Supine to Sit [] [] [] [] [x] [] [] [] [] [] []    Scooting [] [] [] [] [x] [] [] [] [] [] []    Sit to Supine [] [] [] [] [x] [] [] [] [] [] []    Transfers    Sit to Stand [] [] [] [] [] [x] [] [] [] [] []    Bed to Chair [] [] [] [] [] [x] [] [] [] [] []    Stand to Sit [] [] [] [] [] [x] [] [] [] [] []    Tub/Shower [] [] [] [] [] [] [] [] [] [x] []     Toilet [] [] [] [] [] [] [] [] [] [x] []      [] [] [] [] [] [] [] [] [] [] []    I=Independent, Mod I=Modified Independent, S=Supervision/Setup, SBA=Standby Assistance, CGA=Contact Guard Assistance, Min=Minimal Assistance, Mod=Moderate Assistance, Max=Maximal Assistance, Total=Total Assistance, NT=Not Tested    ACTIVITIES OF DAILY LIVING: I Mod I S SBA CGA Min Mod Max Total NT Comments   BASIC ADLs:              Upper Body   Bathing [] [] [] [] [] [] [] [] [] [x]    Lower Body Bathing [] [] [] [] [] [] [] [] [] [x]    Toileting [] [] [] [] [] [x] [] [] [] [] Management of colostomy bag   Upper Body Dressing [] [] [] [] [] [] [] [] [] [x]    Lower Body Dressing [] [] [] [] [] [x] [] [] [] [] Donning socks   Feeding [] [] [] [] [] [] [] [] [] [x]    Grooming [] [] [] [] [x] [] [] [] [] [] Washing hands at sink    Personal [facility] [] [] [] [] [] [] [] [] [] [x]    Functional Mobility [] [] [] [] [] [x] [] [] [] [] Using a RW   I=Independent, Mod I=Modified Independent, S=Supervision/Setup, SBA=Standby Assistance, CGA=Contact Guard Assistance, Min=Minimal Assistance, Mod=Moderate Assistance, Max=Maximal Assistance, Total=Total Assistance, NT=Not Tested    BALANCE: Good Fair+ Fair Fair- Poor NT Comments   Sitting Static [] [x] [] [] [] []    Sitting Dynamic [] [] [x] [] [] []              Standing Static [] [] [x] [] [] []    Standing Dynamic [] [] [x] [] [] []        PLAN:     FREQUENCY/DURATION   OT Plan of Care: 3 times/week for duration of hospital stay or until stated goals are met, whichever comes first.    TREATMENT:     TREATMENT:   Co-Treatment PT/OT necessary due to patient's decreased overall endurance/tolerance levels, as well as need for high level skilled assistance to complete functional transfers/mobility and functional tasks  Therapeutic Activity (19 Minutes): Patient participated in therapeutic activities including functional transfer training, functional mobility of household distances, functional mobility of community distances, bathroom mobility, sitting tolerance activity, and standing tolerance activity with minimal assistance, verbal cues, tactile cues, and visual cues in order to increase independence, increase safety awareness, increase activity tolerance, and decrease assistance required. Self Care (23 minutes): Patient participated in toileting, lower body dressing, and grooming ADLs in unsupported sitting and standing with minimal visual, verbal, manual, and tactile cueing to increase independence, decrease assistance required, and increase activity tolerance. Patient also participated in functional mobility and functional transfer training to increase independence, decrease assistance required, and increase activity tolerance.      TREATMENT GRID:  N/A    AFTER TREATMENT PRECAUTIONS: Bed, Bed/Chair Locked, Call light within reach, Needs within reach, and RN notified    INTERDISCIPLINARY COLLABORATION:  RN/ PCT and OT/ FARNSWORTH    EDUCATION:       TOTAL TREATMENT DURATION AND TIME:  Time In: 1411  Time Out: 1420 [facility]  Minutes: [facility], OT

## 2022-12-08 NOTE — PROGRESS NOTES
Hospitalist Progress Note   Admit Date:  2022  8:32 AM   Name:  Timur Nolasco   Age:  76 y.o. Sex:  male  :  1945   MRN:  [id number]   Room:  [facility]/    Presenting Complaint: Fatigue     Reason(s) for Admission: Generalized weakness [Y20.3]  Complicated UTI (urinary tract infection) [N39.0]  Urinary tract infection associated with indwelling urethral catheter, initial encounter ([facility] [E20.004I, N39.0]     Hospital Course:   Patient is a 76 y.o. male who presented to the ED for cc generalized weakness since this past Friday. Nothing seems to make better or worse. Denies difficulty with vision or lid lag. Pt has a PMHx significant for rectal CA with colostomy no longer on treatment, urostomies related to radiation, due for right stent exchange this month, hypothyroidism, chronic muffled / slurred speech, admission from 10/16-10/27 for lower extremity weakness s/p IVIG, multiple abdominal surgeries with recent SBO (dc'd 2022). In ER UA positive with creatinine 1.60, afebrile. Given acute UTI with hx of complicated UTI (pseudomonas aeruginosa and e faecalis), pt admitted and started on zosyn / vanco.    Subjective & 24hr Events (22): \"Can I get a walker? I can walk better if I got one. \"  Weak-appearing thin 77y.o. WM sitting up in recliner in NAD    Admits to weakness and improved pulmonary congestion; denies fever, chills, unexplained wt loss, CP, hematuria.       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 Cristina Larios in AM      Hx of rectal cancer / s/p colostomy  - aware; cont colostomy care      Cough / pulmonary hypostasis   - flutter valve, mucinex  - cont scheduled nebs  - aspiration precautions  - repeat cxray 12/7/20222 no gross infiltrate / abnl  - s/p solumedrol 125mg IV   - check resp panel (STILL PENDING) / sputum cx for completeness  - clinically improved      HTN  - acceptable ranges      Depression  - mood stable      Debility  - appreciate PT/OT notes, reviewed  - recommending STR        Anticipated discharge needs:    - STR    Diet:  ADULT DIET; Regular; Low Fat/Low Chol/High Fiber/2 gm Na  Diet NPO  DVT PPx: lovenox held for urologic procedure in AM; SCD  Code status: Full Code    Hospital Problems:  Principal Problem:    Urinary tract infection associated with indwelling urethral catheter ([facility]  Active Problems:    Mild episode of recurrent major depressive disorder (HCC)    Leg weakness, bilateral    Rectal cancer (HCC)    Decreased activities of daily living (ADL)    Hypothyroidism    Colostomy care ([facility]    Chronic indwelling John catheter  Resolved Problems:    * No resolved hospital problems.  *      Objective:   Patient Vitals for the past 24 hrs:   Temp Pulse Resp BP SpO2   12/08/22 1207 -- 70 20 -- --   12/08/22 1154 97.3 °F (36.3 °C) 69 19 118/73 97 %   12/08/22 0835 -- 90 20 -- 97 %   12/08/22 0747 97.7 °F (36.5 °C) 94 18 121/73 95 %   12/08/22 0423 97.7 °F (36.5 °C) 68 20 132/86 97 %   12/08/22 0009 97.3 °F (36.3 °C) 86 20 111/72 94 %   12/07/22 2127 -- -- -- -- 94 %   12/07/22 2112 98 °F (36.7 °C) 67 18 109/70 96 %   12/07/22 1626 98.1 °F (36.7 °C) 78 18 110/72 94 %         Oxygen Therapy  SpO2: 97 %  Pulse via Oximetry: 83 beats per minute  Pulse Oximeter Device Mode: Intermittent  Pulse Oximeter Device Location: Finger, Left  O2 Device: None (Room air)    Estimated body mass index is 19.49 kg/m² as calculated from the following:    Height as of this encounter: 5' 3\" (1.6 m). Weight as of this encounter: 110 lb (49.9 kg). Intake/Output Summary (Last 24 hours) at 12/8/2022 1453  Last data filed at 12/8/2022 1328  Gross per 24 hour   Intake --   Output 2050 ml   Net -2050 ml           Physical Exam:     Blood pressure 118/73, pulse 70, temperature 97.3 °F (36.3 °C), temperature source Axillary, resp. rate 20, height 5' 3\" (1.6 m), weight 110 lb (49.9 kg), SpO2 97 %. General:    Thin WM in NAD  Head:  Normocephalic, atraumatic  Eyes:  Sclerae appear normal.  Pupils equally round. ENT:  Nares appear normal, no drainage. Moist oral mucosa  Neck:  No restricted ROM. Trachea midline   CV:   RRR. No m/r/g. No jugular venous distension. Lungs:   Improved rhonchi b/l; no wheezing / accessory muscles used  Abdomen:   Colostomy in place, brown fecal matters noted; +BSx4  Extremities: No cyanosis or clubbing. No edema  Skin:     No rashes and normal coloration. Warm and dry. Neuro:  Chronic garbled speech; no other focal neuro deficits  Psych:  Normal mood and affect.       I have personally reviewed labs and tests showing:  Recent Labs:  Recent Results (from the past 48 hour(s))   Basic Metabolic Panel w/ Reflex to MG    Collection Time: 12/07/22  6:29 AM   Result Value Ref Range    Sodium 137 133 - 143 mmol/L    Potassium 3.8 3.5 - 5.1 mmol/L    Chloride 109 101 - 110 mmol/L    CO2 26 21 - 32 mmol/L    Anion Gap 2 2 - 11 mmol/L    Glucose 95 65 - 100 mg/dL    BUN 23 8 - 23 MG/DL    Creatinine 1.40 0.8 - 1.5 MG/DL    Est, Glom Filt Rate 51 (L) >60 ml/min/1.73m2    Calcium 8.5 8.3 - 10.4 MG/DL   CBC    Collection Time: 12/07/22  6:29 AM   Result Value Ref Range    WBC 7.0 4.3 - 11.1 K/uL    RBC 3.88 (L) 4.23 - 5.6 M/uL    Hemoglobin 11.0 (L) 13.6 - 17.2 g/dL    Hematocrit 35.1 (L) 41.1 - 50.3 %    MCV 90.5 82 - 102 FL    MCH 28.4 26.1 - 32.9 PG    MCHC 31.3 (L) 31.4 - 35.0 g/dL    RDW 14.9 (H) 11.9 - 14.6 %    Platelets 077 255 - 383 K/uL    MPV 9.5 9.4 - 12.3 FL    nRBC 0.00 0.0 - 0.2 K/uL   Basic Metabolic Panel w/ Reflex to MG    Collection Time: 12/08/22  5:26 AM   Result Value Ref Range    Sodium 137 133 - 143 mmol/L    Potassium 4.3 3.5 - 5.1 mmol/L    Chloride 106 101 - 110 mmol/L    CO2 25 21 - 32 mmol/L    Anion Gap 6 2 - 11 mmol/L    Glucose 152 (H) 65 - 100 mg/dL    BUN 24 (H) 8 - 23 MG/DL    Creatinine 1.50 0.8 - 1.5 MG/DL    Est, Glom Filt Rate 47 (L) >60 ml/min/1.73m2    Calcium 8.5 8.3 - 10.4 MG/DL   CBC    Collection Time: 12/08/22  5:26 AM   Result Value Ref Range    WBC 7.1 4.3 - 11.1 K/uL    RBC 3.91 (L) 4.23 - 5.6 M/uL    Hemoglobin 11.2 (L) 13.6 - 17.2 g/dL    Hematocrit 35.5 (L) 41.1 - 50.3 %    MCV 90.8 82 - 102 FL    MCH 28.6 26.1 - 32.9 PG    MCHC 31.5 31.4 - 35.0 g/dL    RDW 14.7 (H) 11.9 - 14.6 %    Platelets 598 485 - 319 K/uL    MPV 10.1 9.4 - 12.3 FL    nRBC 0.00 0.0 - 0.2 K/uL   PLEASE READ & DOCUMENT PPD TEST IN 24 HRS    Collection Time: 12/08/22  5:43 AM   Result Value Ref Range    PPD, (POC) Negative Negative    mm Induration 0 0 - 5 mm       I have personally reviewed imaging studies showing: Other Studies:  XR CHEST PORTABLE   Final Result      1. No acute cardiopulmonary process. CPT code(s) 57203                  XR CHEST PORTABLE   Final Result   1. No acute abnormality in the chest.   2. No acute abdominal or pelvic abnormality. Interval decrease in the small   bowel dilation. XR ABDOMEN (KUB) (SINGLE AP VIEW)   Final Result   1. No acute abnormality in the chest.   2. No acute abdominal or pelvic abnormality. Interval decrease in the small   bowel dilation.           Current Meds:  Current Facility-Administered Medications   Medication Dose Route Frequency    guaiFENesin (MUCINEX) extended release tablet 600 mg  600 mg Oral BID    ipratropium-albuterol (DUONEB) nebulizer solution 1 ampule  1 ampule Inhalation Q4H WA    polyethylene glycol (GLYCOLAX) packet 17 g  17 g Oral Daily PRN    levothyroxine (SYNTHROID) tablet 125 mcg  125 mcg Oral Daily    piperacillin-tazobactam (ZOSYN) 3,375 mg in sodium chloride 0.9 % 50 mL IVPB (mini-bag)  3,375 mg IntraVENous Q8H    enoxaparin (LOVENOX) injection 40 mg  40 mg SubCUTAneous Q24H    lidocaine (XYLOCAINE) 2 % uro-jet   Topical PRN    clonazePAM (KLONOPIN) tablet 4 mg  4 mg Oral QHS PRN    OLANZapine (ZYPREXA) tablet 7.5 mg  7.5 mg Oral Nightly    amitriptyline (ELAVIL) tablet 50 mg  50 mg Oral Nightly    carvedilol (COREG) tablet 6.25 mg  6.25 mg Oral BID WC    [Held by provider] OLANZapine (ZYPREXA) tablet 7.5 mg  7.5 mg Oral Nightly    clonazePAM (KLONOPIN) tablet 2 mg  2 mg Oral Q8H PRN    sodium chloride flush 0.9 % injection 5-40 mL  5-40 mL IntraVENous 2 times per day    sodium chloride flush 0.9 % injection 5-40 mL  5-40 mL IntraVENous PRN    0.9 % sodium chloride infusion   IntraVENous PRN    acetaminophen (TYLENOL) tablet 650 mg  650 mg Oral Q6H PRN    Or    acetaminophen (TYLENOL) suppository 650 mg  650 mg Rectal Q6H PRN       Signed:  GENIA Joya    Part of this note may have been written by using a voice dictation software. The note has been proof read but may still contain some grammatical/other typographical errors.

## 2022-12-08 NOTE — CARE COORDINATION
Chart reviewed by . Patient has been accepted to [facility] SNF selected. Plan for cystoscopy, right retrograde pyelogram, and right ureteral stent exchange with Urology on 12/9. Patient to remain hospitalized, pending medical clearance. Please consult  if needs arise. CM continues to follow.     Disposition: STR

## 2022-12-09 ENCOUNTER — ANESTHESIA (OUTPATIENT)
Dept: SURGERY | Age: 77
End: 2022-12-09
Payer: MEDICARE

## 2022-12-09 LAB
ANION GAP SERPL CALC-SCNC: 3 MMOL/L (ref 2–11)
BACTERIA SPEC CULT: ABNORMAL
BACTERIA SPEC CULT: ABNORMAL
BUN SERPL-MCNC: 27 MG/DL (ref 8–23)
CALCIUM SERPL-MCNC: 8.6 MG/DL (ref 8.3–10.4)
CHLORIDE SERPL-SCNC: 107 MMOL/L (ref 101–110)
CO2 SERPL-SCNC: 28 MMOL/L (ref 21–32)
CREAT SERPL-MCNC: 1.5 MG/DL (ref 0.8–1.5)
ERYTHROCYTE [DISTWIDTH] IN BLOOD BY AUTOMATED COUNT: 14.7 % (ref 11.9–14.6)
GLUCOSE SERPL-MCNC: 79 MG/DL (ref 65–100)
HCT VFR BLD AUTO: 33.6 % (ref 41.1–50.3)
HGB BLD-MCNC: 10.6 G/DL (ref 13.6–17.2)
MCH RBC QN AUTO: 28.9 PG (ref 26.1–32.9)
MCHC RBC AUTO-ENTMCNC: 31.5 G/DL (ref 31.4–35)
MCV RBC AUTO: 91.6 FL (ref 82–102)
MM INDURATION, POC: 0 MM (ref 0–5)
NRBC # BLD: 0 K/UL (ref 0–0.2)
PLATELET # BLD AUTO: 211 K/UL (ref 150–450)
PMV BLD AUTO: 9.5 FL (ref 9.4–12.3)
POTASSIUM SERPL-SCNC: 4 MMOL/L (ref 3.5–5.1)
PPD, POC: NEGATIVE
RBC # BLD AUTO: 3.67 M/UL (ref 4.23–5.6)
SERVICE CMNT-IMP: ABNORMAL
SODIUM SERPL-SCNC: 138 MMOL/L (ref 133–143)
WBC # BLD AUTO: 5.4 K/UL (ref 4.3–11.1)

## 2022-12-09 PROCEDURE — 51702 INSERT TEMP BLADDER CATH: CPT

## 2022-12-09 PROCEDURE — 6370000000 HC RX 637 (ALT 250 FOR IP): Performed by: PHYSICIAN ASSISTANT

## 2022-12-09 PROCEDURE — 52332 CYSTOSCOPY AND TREATMENT: CPT | Performed by: UROLOGY

## 2022-12-09 PROCEDURE — 6360000002 HC RX W HCPCS: Performed by: INTERNAL MEDICINE

## 2022-12-09 PROCEDURE — 6370000000 HC RX 637 (ALT 250 FOR IP): Performed by: INTERNAL MEDICINE

## 2022-12-09 PROCEDURE — 36415 COLL VENOUS BLD VENIPUNCTURE: CPT

## 2022-12-09 PROCEDURE — 1100000000 HC RM PRIVATE

## 2022-12-09 PROCEDURE — 0T768DZ DILATION OF RIGHT URETER WITH INTRALUMINAL DEVICE, VIA NATURAL OR ARTIFICIAL OPENING ENDOSCOPIC: ICD-10-PCS | Performed by: UROLOGY

## 2022-12-09 PROCEDURE — BT1D1ZZ FLUOROSCOPY OF RIGHT KIDNEY, URETER AND BLADDER USING LOW OSMOLAR CONTRAST: ICD-10-PCS | Performed by: UROLOGY

## 2022-12-09 PROCEDURE — 6360000002 HC RX W HCPCS: Performed by: UROLOGY

## 2022-12-09 PROCEDURE — 80048 BASIC METABOLIC PNL TOTAL CA: CPT

## 2022-12-09 PROCEDURE — 6370000000 HC RX 637 (ALT 250 FOR IP): Performed by: ANESTHESIOLOGY

## 2022-12-09 PROCEDURE — C2617 STENT, NON-COR, TEM W/O DEL: HCPCS | Performed by: UROLOGY

## 2022-12-09 PROCEDURE — 85027 COMPLETE CBC AUTOMATED: CPT

## 2022-12-09 PROCEDURE — 2500000003 HC RX 250 WO HCPCS: Performed by: NURSE ANESTHETIST, CERTIFIED REGISTERED

## 2022-12-09 PROCEDURE — 7100000000 HC PACU RECOVERY - FIRST 15 MIN: Performed by: UROLOGY

## 2022-12-09 PROCEDURE — 0TP98DZ REMOVAL OF INTRALUMINAL DEVICE FROM URETER, VIA NATURAL OR ARTIFICIAL OPENING ENDOSCOPIC: ICD-10-PCS | Performed by: UROLOGY

## 2022-12-09 PROCEDURE — 2580000003 HC RX 258: Performed by: ANESTHESIOLOGY

## 2022-12-09 PROCEDURE — 2580000003 HC RX 258: Performed by: INTERNAL MEDICINE

## 2022-12-09 PROCEDURE — 3600000004 HC SURGERY LEVEL 4 BASE: Performed by: UROLOGY

## 2022-12-09 PROCEDURE — 6360000002 HC RX W HCPCS: Performed by: NURSE ANESTHETIST, CERTIFIED REGISTERED

## 2022-12-09 PROCEDURE — 2580000003 HC RX 258: Performed by: FAMILY MEDICINE

## 2022-12-09 PROCEDURE — 99232 SBSQ HOSP IP/OBS MODERATE 35: CPT | Performed by: UROLOGY

## 2022-12-09 PROCEDURE — 94640 AIRWAY INHALATION TREATMENT: CPT

## 2022-12-09 PROCEDURE — 3600000014 HC SURGERY LEVEL 4 ADDTL 15MIN: Performed by: UROLOGY

## 2022-12-09 PROCEDURE — 6370000000 HC RX 637 (ALT 250 FOR IP): Performed by: FAMILY MEDICINE

## 2022-12-09 PROCEDURE — 6360000004 HC RX CONTRAST MEDICATION: Performed by: UROLOGY

## 2022-12-09 PROCEDURE — 3700000000 HC ANESTHESIA ATTENDED CARE: Performed by: UROLOGY

## 2022-12-09 PROCEDURE — 2709999900 HC NON-CHARGEABLE SUPPLY: Performed by: UROLOGY

## 2022-12-09 PROCEDURE — 3700000001 HC ADD 15 MINUTES (ANESTHESIA): Performed by: UROLOGY

## 2022-12-09 PROCEDURE — 7100000001 HC PACU RECOVERY - ADDTL 15 MIN: Performed by: UROLOGY

## 2022-12-09 PROCEDURE — C1769 GUIDE WIRE: HCPCS | Performed by: UROLOGY

## 2022-12-09 DEVICE — URETERAL STENT
Type: IMPLANTABLE DEVICE | Status: FUNCTIONAL
Brand: PERCUFLEX™ PLUS

## 2022-12-09 RX ORDER — ONDANSETRON 2 MG/ML
INJECTION INTRAMUSCULAR; INTRAVENOUS PRN
Status: DISCONTINUED | OUTPATIENT
Start: 2022-12-09 | End: 2022-12-09 | Stop reason: SDUPTHER

## 2022-12-09 RX ORDER — SODIUM CHLORIDE 0.9 % (FLUSH) 0.9 %
5-40 SYRINGE (ML) INJECTION EVERY 12 HOURS SCHEDULED
Status: DISCONTINUED | OUTPATIENT
Start: 2022-12-09 | End: 2022-12-09 | Stop reason: HOSPADM

## 2022-12-09 RX ORDER — FENTANYL CITRATE 50 UG/ML
100 INJECTION, SOLUTION INTRAMUSCULAR; INTRAVENOUS
Status: DISCONTINUED | OUTPATIENT
Start: 2022-12-09 | End: 2022-12-09 | Stop reason: HOSPADM

## 2022-12-09 RX ORDER — DEXTROSE MONOHYDRATE 100 MG/ML
INJECTION, SOLUTION INTRAVENOUS CONTINUOUS PRN
Status: DISCONTINUED | OUTPATIENT
Start: 2022-12-09 | End: 2022-12-09 | Stop reason: HOSPADM

## 2022-12-09 RX ORDER — DIPHENHYDRAMINE HYDROCHLORIDE 50 MG/ML
12.5 INJECTION INTRAMUSCULAR; INTRAVENOUS
Status: DISCONTINUED | OUTPATIENT
Start: 2022-12-09 | End: 2022-12-09 | Stop reason: HOSPADM

## 2022-12-09 RX ORDER — ACETAMINOPHEN 500 MG
1000 TABLET ORAL ONCE
Status: COMPLETED | OUTPATIENT
Start: 2022-12-09 | End: 2022-12-09

## 2022-12-09 RX ORDER — PROCHLORPERAZINE EDISYLATE 5 MG/ML
5 INJECTION INTRAMUSCULAR; INTRAVENOUS
Status: DISCONTINUED | OUTPATIENT
Start: 2022-12-09 | End: 2022-12-09 | Stop reason: HOSPADM

## 2022-12-09 RX ORDER — SODIUM CHLORIDE 9 MG/ML
INJECTION, SOLUTION INTRAVENOUS PRN
Status: DISCONTINUED | OUTPATIENT
Start: 2022-12-09 | End: 2022-12-09 | Stop reason: HOSPADM

## 2022-12-09 RX ORDER — SODIUM CHLORIDE, SODIUM LACTATE, POTASSIUM CHLORIDE, CALCIUM CHLORIDE 600; 310; 30; 20 MG/100ML; MG/100ML; MG/100ML; MG/100ML
INJECTION, SOLUTION INTRAVENOUS CONTINUOUS
Status: DISCONTINUED | OUTPATIENT
Start: 2022-12-09 | End: 2022-12-09 | Stop reason: HOSPADM

## 2022-12-09 RX ORDER — PROPOFOL 10 MG/ML
INJECTION, EMULSION INTRAVENOUS PRN
Status: DISCONTINUED | OUTPATIENT
Start: 2022-12-09 | End: 2022-12-09 | Stop reason: SDUPTHER

## 2022-12-09 RX ORDER — OXYCODONE HYDROCHLORIDE 5 MG/1
5 TABLET ORAL
Status: DISCONTINUED | OUTPATIENT
Start: 2022-12-09 | End: 2022-12-09 | Stop reason: HOSPADM

## 2022-12-09 RX ORDER — MIDAZOLAM HYDROCHLORIDE 2 MG/2ML
2 INJECTION, SOLUTION INTRAMUSCULAR; INTRAVENOUS
Status: DISCONTINUED | OUTPATIENT
Start: 2022-12-09 | End: 2022-12-09 | Stop reason: HOSPADM

## 2022-12-09 RX ORDER — LIDOCAINE HYDROCHLORIDE 10 MG/ML
1 INJECTION, SOLUTION INFILTRATION; PERINEURAL
Status: DISCONTINUED | OUTPATIENT
Start: 2022-12-09 | End: 2022-12-09 | Stop reason: HOSPADM

## 2022-12-09 RX ORDER — ROCURONIUM BROMIDE 10 MG/ML
INJECTION, SOLUTION INTRAVENOUS PRN
Status: DISCONTINUED | OUTPATIENT
Start: 2022-12-09 | End: 2022-12-09 | Stop reason: SDUPTHER

## 2022-12-09 RX ORDER — NEOSTIGMINE METHYLSULFATE 1 MG/ML
INJECTION, SOLUTION INTRAVENOUS PRN
Status: DISCONTINUED | OUTPATIENT
Start: 2022-12-09 | End: 2022-12-09 | Stop reason: SDUPTHER

## 2022-12-09 RX ORDER — PHENYLEPHRINE HYDROCHLORIDE 10 MG/ML
INJECTION INTRAVENOUS PRN
Status: DISCONTINUED | OUTPATIENT
Start: 2022-12-09 | End: 2022-12-09 | Stop reason: SDUPTHER

## 2022-12-09 RX ORDER — HYDROMORPHONE HCL 110MG/55ML
0.5 PATIENT CONTROLLED ANALGESIA SYRINGE INTRAVENOUS EVERY 10 MIN PRN
Status: DISCONTINUED | OUTPATIENT
Start: 2022-12-09 | End: 2022-12-09 | Stop reason: HOSPADM

## 2022-12-09 RX ORDER — SODIUM CHLORIDE 0.9 % (FLUSH) 0.9 %
5-40 SYRINGE (ML) INJECTION PRN
Status: DISCONTINUED | OUTPATIENT
Start: 2022-12-09 | End: 2022-12-09 | Stop reason: HOSPADM

## 2022-12-09 RX ORDER — HYDRALAZINE HYDROCHLORIDE 20 MG/ML
10 INJECTION INTRAMUSCULAR; INTRAVENOUS EVERY 8 HOURS PRN
Status: DISCONTINUED | OUTPATIENT
Start: 2022-12-09 | End: 2022-12-12 | Stop reason: HOSPADM

## 2022-12-09 RX ORDER — GLYCOPYRROLATE 0.2 MG/ML
INJECTION INTRAMUSCULAR; INTRAVENOUS PRN
Status: DISCONTINUED | OUTPATIENT
Start: 2022-12-09 | End: 2022-12-09 | Stop reason: SDUPTHER

## 2022-12-09 RX ORDER — LIDOCAINE HYDROCHLORIDE 20 MG/ML
INJECTION, SOLUTION EPIDURAL; INFILTRATION; INTRACAUDAL; PERINEURAL PRN
Status: DISCONTINUED | OUTPATIENT
Start: 2022-12-09 | End: 2022-12-09 | Stop reason: SDUPTHER

## 2022-12-09 RX ORDER — DEXAMETHASONE SODIUM PHOSPHATE 10 MG/ML
INJECTION INTRAMUSCULAR; INTRAVENOUS PRN
Status: DISCONTINUED | OUTPATIENT
Start: 2022-12-09 | End: 2022-12-09 | Stop reason: SDUPTHER

## 2022-12-09 RX ORDER — SODIUM CHLORIDE, SODIUM LACTATE, POTASSIUM CHLORIDE, CALCIUM CHLORIDE 600; 310; 30; 20 MG/100ML; MG/100ML; MG/100ML; MG/100ML
INJECTION, SOLUTION INTRAVENOUS CONTINUOUS
Status: DISCONTINUED | OUTPATIENT
Start: 2022-12-09 | End: 2022-12-10

## 2022-12-09 RX ADMIN — OLANZAPINE 7.5 MG: 2.5 TABLET, FILM COATED ORAL at 22:05

## 2022-12-09 RX ADMIN — CARVEDILOL 6.25 MG: 6.25 TABLET, FILM COATED ORAL at 08:42

## 2022-12-09 RX ADMIN — IPRATROPIUM BROMIDE AND ALBUTEROL SULFATE 1 AMPULE: .5; 3 SOLUTION RESPIRATORY (INHALATION) at 09:08

## 2022-12-09 RX ADMIN — LIDOCAINE HYDROCHLORIDE 40 MG: 20 INJECTION, SOLUTION EPIDURAL; INFILTRATION; INTRACAUDAL; PERINEURAL at 14:37

## 2022-12-09 RX ADMIN — IPRATROPIUM BROMIDE AND ALBUTEROL SULFATE 1 AMPULE: .5; 3 SOLUTION RESPIRATORY (INHALATION) at 11:29

## 2022-12-09 RX ADMIN — IPRATROPIUM BROMIDE AND ALBUTEROL SULFATE 1 AMPULE: .5; 3 SOLUTION RESPIRATORY (INHALATION) at 20:29

## 2022-12-09 RX ADMIN — SODIUM CHLORIDE, POTASSIUM CHLORIDE, SODIUM LACTATE AND CALCIUM CHLORIDE: 600; 310; 30; 20 INJECTION, SOLUTION INTRAVENOUS at 13:47

## 2022-12-09 RX ADMIN — Medication 3 MG: at 15:10

## 2022-12-09 RX ADMIN — PIPERACILLIN AND TAZOBACTAM 3375 MG: 3; .375 INJECTION, POWDER, FOR SOLUTION INTRAVENOUS at 10:50

## 2022-12-09 RX ADMIN — PHENYLEPHRINE HYDROCHLORIDE 200 MCG: 10 INJECTION INTRAVENOUS at 14:51

## 2022-12-09 RX ADMIN — ROCURONIUM BROMIDE 25 MG: 50 INJECTION, SOLUTION INTRAVENOUS at 14:38

## 2022-12-09 RX ADMIN — PHENYLEPHRINE HYDROCHLORIDE 200 MCG: 10 INJECTION INTRAVENOUS at 14:45

## 2022-12-09 RX ADMIN — PROPOFOL 30 MG: 10 INJECTION, EMULSION INTRAVENOUS at 14:40

## 2022-12-09 RX ADMIN — CARVEDILOL 6.25 MG: 6.25 TABLET, FILM COATED ORAL at 17:56

## 2022-12-09 RX ADMIN — SODIUM CHLORIDE, POTASSIUM CHLORIDE, SODIUM LACTATE AND CALCIUM CHLORIDE: 600; 310; 30; 20 INJECTION, SOLUTION INTRAVENOUS at 18:07

## 2022-12-09 RX ADMIN — ONDANSETRON 4 MG: 2 INJECTION INTRAMUSCULAR; INTRAVENOUS at 14:45

## 2022-12-09 RX ADMIN — Medication 2000 MG: at 14:43

## 2022-12-09 RX ADMIN — SODIUM CHLORIDE, PRESERVATIVE FREE 10 ML: 5 INJECTION INTRAVENOUS at 22:08

## 2022-12-09 RX ADMIN — DEXAMETHASONE SODIUM PHOSPHATE 10 MG: 10 INJECTION INTRAMUSCULAR; INTRAVENOUS at 14:45

## 2022-12-09 RX ADMIN — SODIUM CHLORIDE, PRESERVATIVE FREE 10 ML: 5 INJECTION INTRAVENOUS at 08:44

## 2022-12-09 RX ADMIN — PIPERACILLIN AND TAZOBACTAM 3375 MG: 3; .375 INJECTION, POWDER, FOR SOLUTION INTRAVENOUS at 17:57

## 2022-12-09 RX ADMIN — LEVOTHYROXINE SODIUM 125 MCG: 0.07 TABLET ORAL at 05:45

## 2022-12-09 RX ADMIN — PROPOFOL 100 MG: 10 INJECTION, EMULSION INTRAVENOUS at 14:37

## 2022-12-09 RX ADMIN — GUAIFENESIN 600 MG: 600 TABLET ORAL at 22:06

## 2022-12-09 RX ADMIN — AMITRIPTYLINE HYDROCHLORIDE 50 MG: 50 TABLET, FILM COATED ORAL at 22:05

## 2022-12-09 RX ADMIN — GLYCOPYRROLATE 0.4 MG: 0.2 INJECTION, SOLUTION INTRAMUSCULAR; INTRAVENOUS at 15:10

## 2022-12-09 RX ADMIN — ACETAMINOPHEN 1000 MG: 500 TABLET ORAL at 13:47

## 2022-12-09 RX ADMIN — CLONAZEPAM 4 MG: 1 TABLET ORAL at 22:04

## 2022-12-09 RX ADMIN — PIPERACILLIN AND TAZOBACTAM 3375 MG: 3; .375 INJECTION, POWDER, FOR SOLUTION INTRAVENOUS at 02:04

## 2022-12-09 ASSESSMENT — PAIN - FUNCTIONAL ASSESSMENT: PAIN_FUNCTIONAL_ASSESSMENT: 0-10

## 2022-12-09 ASSESSMENT — PAIN SCALES - GENERAL: PAINLEVEL_OUTOF10: 0

## 2022-12-09 NOTE — PROGRESS NOTES
Hospitalist Progress Note   Admit Date:  2022  8:32 AM   Name:  Janes Nolasco   Age:  76 y.o. Sex:  male  :  1945   MRN:  [id number]   Room:  [facility]/    Presenting Complaint: Fatigue     Reason(s) for Admission: Generalized weakness [H98.0]  Complicated UTI (urinary tract infection) [N39.0]  Urinary tract infection associated with indwelling urethral catheter, initial encounter ([facility] [V30.243D, N39.0]     Hospital Course:   Patient is a 76 y.o. male who presented to the ED for cc generalized weakness since this past Friday. Nothing seems to make better or worse. Denies difficulty with vision or lid lag. Pt has a PMHx significant for rectal CA with colostomy no longer on treatment, urostomies related to radiation, due for right stent exchange this month, hypothyroidism, chronic muffled / slurred speech, admission from 10/16-10/27 for lower extremity weakness s/p IVIG, multiple abdominal surgeries with recent SBO (dc'd 2022). In ER UA positive with creatinine 1.60, afebrile. Given acute UTI with hx of complicated UTI (pseudomonas aeruginosa and e faecalis), pt admitted and started on zosyn / vanco.     Resp panel + parainfluenza     Subjective & 24hr Events (22): \"My cough and congestion is much better. \"  Weak-appearing thin 77y.o. WM in bed in NAD    Admits to weakness; denies cough, congestion, fever, chills, unexplained wt loss, CP, hematuria.       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 Milady Galindo today; per urology right ureter remains narrow and still requiring ureteral stent       Hx of rectal cancer / s/p colostomy  - aware; cont colostomy care      Cough / pulmonary hypostasis   - attributed to parainfluenza virus  - better today  - droplet precautions      HTN  - elevated levels  - cont coreg, prn hydralazine  - may need to titrate up coreg if consistently unncontrolled      Depression  - mood stable      Debility  - appreciate PT/OT notes, reviewed  - recommending STR        Anticipated discharge needs:    - STR on dc    Diet:  ADULT DIET; Regular  DVT PPx: lovenox held for urologic procedure in AM; SCD  Code status: Full Code    Hospital Problems:  Principal Problem:    Urinary tract infection associated with indwelling urethral catheter ([facility]  Active Problems:    Mild episode of recurrent major depressive disorder (HCC)    Leg weakness, bilateral    Rectal cancer (HCC)    Decreased activities of daily living (ADL)    Hypothyroidism    Hydronephrosis of right kidney    Colostomy care (HCC)    Chronic indwelling John catheter  Resolved Problems:    * No resolved hospital problems.  *      Objective:   Patient Vitals for the past 24 hrs:   Temp Pulse Resp BP SpO2   12/09/22 1620 -- 50 17 -- --   12/09/22 1615 -- 61 16 (!) 163/88 --   12/09/22 1610 -- 58 17 (!) 171/77 100 %   12/09/22 1605 -- 61 16 (!) 150/74 100 %   12/09/22 1600 -- 60 16 (!) 153/81 100 %   12/09/22 1555 -- 67 17 (!) 156/77 100 %   12/09/22 1551 -- 67 -- -- --   12/09/22 1545 -- 63 16 (!) 140/74 100 %   12/09/22 1540 -- 64 17 133/70 98 %   12/09/22 1535 -- 64 17 125/63 97 %   12/09/22 1530 -- 68 16 120/72 100 %   12/09/22 1525 -- 77 16 116/73 100 %   12/09/22 1521 97.8 °F (36.6 °C) 60 14 (!) 144/71 100 %   12/09/22 1353 97.7 °F (36.5 °C) 73 15 (!) 150/80 96 %   12/09/22 1134 97.5 °F (36.4 °C) 70 18 125/84 100 %   12/09/22 0905 -- 82 18 -- 100 %   12/09/22 0746 97.2 °F (36.2 °C) 79 18 100/64 96 %   12/09/22 0536 97.9 °F (36.6 °C) 65 18 108/66 93 %   12/08/22 2338 97.7 °F (36.5 °C) 57 18 117/61 93 %   12/08/22 2031 -- 76 18 -- 95 %   12/08/22 1912 97.9 °F (36.6 °C) 70 18 103/62 98 %   12/08/22 1642 97.9 °F (36.6 °C) 73 18 126/74 96 %         Oxygen Therapy  SpO2: 100 %  Pulse Oximetry Type: Intermittent  Pulse via Oximetry: 61 beats per minute  Pulse Oximeter Device Mode: Continuous  Pulse Oximeter Device Location: Finger  O2 Device: Nasal cannula  Skin Assessment: Clean, dry, & intact  Skin Protection for O2 Device: N/A  O2 Flow Rate (L/min): 2 L/min  Blood Gas  Performed?: No  Oxygen Therapy: None (Room air)    Estimated body mass index is 16.73 kg/m² as calculated from the following:    Height as of this encounter: 5' 8\" (1.727 m). Weight as of this encounter: 110 lb (49.9 kg). Intake/Output Summary (Last 24 hours) at 12/9/2022 1631  Last data filed at 12/9/2022 1505  Gross per 24 hour   Intake 700 ml   Output 0 ml   Net 700 ml           Physical Exam:     Blood pressure (!) 163/88, pulse 50, temperature 97.8 °F (36.6 °C), temperature source Temporal, resp. rate 17, height 5' 8\" (1.727 m), weight 110 lb (49.9 kg), SpO2 100 %. General:    Thin WM in NAD  Head:  Normocephalic, atraumatic  Eyes:  Sclerae appear normal.  Pupils equally round. ENT:  Nares appear normal, no drainage. Moist oral mucosa  Neck:  No restricted ROM. Trachea midline   CV:   RRR. No m/r/g. No jugular venous distension. Lungs:   CTA b/l; no wheezing / rhonchi / accessory muscles used  Abdomen:   Colostomy in place, brown fecal matters noted; +BSx4  Extremities: No cyanosis or clubbing. No edema  Skin:     No rashes and normal coloration. Warm and dry. Neuro:  Chronic garbled speech; A&Ox3 no other focal neuro deficits  Psych:  Normal mood and affect.       I have personally reviewed labs and tests showing:  Recent Labs:  Recent Results (from the past 48 hour(s))   Basic Metabolic Panel w/ Reflex to MG    Collection Time: 12/08/22  5:26 AM   Result Value Ref Range    Sodium 137 133 - 143 mmol/L    Potassium 4.3 3.5 - 5.1 mmol/L    Chloride 106 101 - 110 mmol/L    CO2 25 21 - 32 mmol/L    Anion Gap 6 2 - 11 mmol/L    Glucose 152 (H) 65 - 100 mg/dL    BUN 24 (H) 8 - 23 MG/DL    Creatinine 1.50 0.8 - 1.5 MG/DL    Est, Glom Filt Rate 47 (L) >60 ml/min/1.73m2    Calcium 8.5 8.3 - 10.4 MG/DL   CBC    Collection Time: 12/08/22  5:26 AM   Result Value Ref Range    WBC 7.1 4.3 - 11.1 K/uL    RBC 3.91 (L) 4.23 - 5.6 M/uL    Hemoglobin 11.2 (L) 13.6 - 17.2 g/dL    Hematocrit 35.5 (L) 41.1 - 50.3 %    MCV 90.8 82 - 102 FL    MCH 28.6 26.1 - 32.9 PG    MCHC 31.5 31.4 - 35.0 g/dL    RDW 14.7 (H) 11.9 - 14.6 %    Platelets 330 682 - 259 K/uL    MPV 10.1 9.4 - 12.3 FL    nRBC 0.00 0.0 - 0.2 K/uL   PLEASE READ & DOCUMENT PPD TEST IN 24 HRS    Collection Time: 12/08/22  5:43 AM   Result Value Ref Range    PPD, (POC) Negative Negative    mm Induration 0 0 - 5 mm   Respiratory Panel, Molecular, with COVID-19 (Restricted: peds pts or suitable admitted adults)    Collection Time: 12/08/22  3:22 PM    Specimen: Nasopharyngeal   Result Value Ref Range    Adenovirus by PCR NOT DETECTED NOTDET      Coronavirus 229E by PCR NOT DETECTED NOTDET      Coronavirus HKU1 by PCR NOT DETECTED NOTDET      Coronavirus NL63 by PCR NOT DETECTED NOTDET      Coronavirus OC43 by PCR NOT DETECTED NOTDET      SARS-CoV-2, PCR NOT DETECTED NOTDET      Human Metapneumovirus by PCR NOT DETECTED NOTDET      Rhinovirus Enterovirus PCR NOT DETECTED NOTDET      Influenza A by PCR NOT DETECTED NOTDET      Influenza B PCR NOT DETECTED NOTDET      Parainfluenza 1 PCR Detected (A) NOTDET      Parainfluenza 2 PCR NOT DETECTED NOTDET      Parainfluenza 3 PCR NOT DETECTED NOTDET      Parainfluenza 4 PCR NOT DETECTED NOTDET      Respiratory Syncytial Virus by PCR NOT DETECTED NOTDET      Bordetella parapertussis by PCR NOT DETECTED NOTDET      Bordetella pertussis by PCR NOT DETECTED NOTDET      Chlamydophila Pneumonia PCR NOT DETECTED NOTDET      Mycoplasma pneumo by PCR NOT DETECTED NOTDET     PLEASE READ & DOCUMENT PPD TEST IN 24 HRS    Collection Time: 12/09/22 12:00 AM   Result Value Ref Range    PPD, (POC) Negative Negative    mm Induration 0 0 - 5 mm   Basic Metabolic Panel w/ Reflex to MG    Collection Time: 12/09/22  6:55 AM   Result Value Ref Range    Sodium 138 133 - 143 mmol/L    Potassium 4.0 3.5 - 5.1 mmol/L    Chloride 107 101 - 110 mmol/L    CO2 28 21 - 32 mmol/L    Anion Gap 3 2 - 11 mmol/L    Glucose 79 65 - 100 mg/dL    BUN 27 (H) 8 - 23 MG/DL    Creatinine 1.50 0.8 - 1.5 MG/DL    Est, Glom Filt Rate 47 (L) >60 ml/min/1.73m2    Calcium 8.6 8.3 - 10.4 MG/DL   CBC    Collection Time: 12/09/22  6:55 AM   Result Value Ref Range    WBC 5.4 4.3 - 11.1 K/uL    RBC 3.67 (L) 4.23 - 5.6 M/uL    Hemoglobin 10.6 (L) 13.6 - 17.2 g/dL    Hematocrit 33.6 (L) 41.1 - 50.3 %    MCV 91.6 82 - 102 FL    MCH 28.9 26.1 - 32.9 PG    MCHC 31.5 31.4 - 35.0 g/dL    RDW 14.7 (H) 11.9 - 14.6 %    Platelets 806 505 - 868 K/uL    MPV 9.5 9.4 - 12.3 FL    nRBC 0.00 0.0 - 0.2 K/uL       I have personally reviewed imaging studies showing: Other Studies:  XR CHEST PORTABLE   Final Result      1. No acute cardiopulmonary process. CPT code(s) 27957                  XR CHEST PORTABLE   Final Result   1. No acute abnormality in the chest.   2. No acute abdominal or pelvic abnormality. Interval decrease in the small   bowel dilation. XR ABDOMEN (KUB) (SINGLE AP VIEW)   Final Result   1. No acute abnormality in the chest.   2. No acute abdominal or pelvic abnormality. Interval decrease in the small   bowel dilation.           Current Meds:  Current Facility-Administered Medications   Medication Dose Route Frequency    lactated ringers infusion   IntraVENous Continuous    sodium chloride flush 0.9 % injection 5-40 mL 5-40 mL IntraVENous 2 times per day    sodium chloride flush 0.9 % injection 5-40 mL  5-40 mL IntraVENous PRN    0.9 % sodium chloride infusion   IntraVENous PRN    HYDROmorphone (DILAUDID) injection 0.5 mg  0.5 mg IntraVENous Q10 Min PRN    oxyCODONE (ROXICODONE) immediate release tablet 5 mg  5 mg Oral Once PRN    prochlorperazine (COMPAZINE) injection 5 mg  5 mg IntraVENous Once PRN    diphenhydrAMINE (BENADRYL) injection 12.5 mg  12.5 mg IntraVENous Once PRN    glucose chewable tablet 16 g  4 tablet Oral PRN    dextrose bolus 10% 125 mL  125 mL IntraVENous PRN    Or    dextrose bolus 10% 250 mL  250 mL IntraVENous PRN    glucagon (rDNA) injection 1 mg  1 mg SubCUTAneous PRN    dextrose 10 % infusion   IntraVENous Continuous PRN    guaiFENesin (MUCINEX) extended release tablet 600 mg  600 mg Oral BID    ipratropium-albuterol (DUONEB) nebulizer solution 1 ampule  1 ampule Inhalation Q4H WA    polyethylene glycol (GLYCOLAX) packet 17 g  17 g Oral Daily PRN    levothyroxine (SYNTHROID) tablet 125 mcg  125 mcg Oral Daily    piperacillin-tazobactam (ZOSYN) 3,375 mg in sodium chloride 0.9 % 50 mL IVPB (mini-bag)  3,375 mg IntraVENous Q8H    [Held by provider] enoxaparin (LOVENOX) injection 40 mg  40 mg SubCUTAneous Q24H    lidocaine (XYLOCAINE) 2 % uro-jet   Topical PRN    clonazePAM (KLONOPIN) tablet 4 mg  4 mg Oral QHS PRN    OLANZapine (ZYPREXA) tablet 7.5 mg  7.5 mg Oral Nightly    amitriptyline (ELAVIL) tablet 50 mg  50 mg Oral Nightly    carvedilol (COREG) tablet 6.25 mg  6.25 mg Oral BID WC    [Held by provider] OLANZapine (ZYPREXA) tablet 7.5 mg  7.5 mg Oral Nightly    clonazePAM (KLONOPIN) tablet 2 mg  2 mg Oral Q8H PRN    sodium chloride flush 0.9 % injection 5-40 mL  5-40 mL IntraVENous 2 times per day    sodium chloride flush 0.9 % injection 5-40 mL  5-40 mL IntraVENous PRN    0.9 % sodium chloride infusion   IntraVENous PRN    acetaminophen (TYLENOL) tablet 650 mg  650 mg Oral Q6H PRN    Or acetaminophen (TYLENOL) suppository 650 mg  650 mg Rectal Q6H PRN       Signed:  GENIA Joya    Part of this note may have been written by using a voice dictation software. The note has been proof read but may still contain some grammatical/other typographical errors.

## 2022-12-09 NOTE — ANESTHESIA PRE PROCEDURE
Department of Anesthesiology  Preprocedure Note       Name:  Jose Francisco Nolasco   Age:  76 y.o.  :  1945                                          MRN:  [id number]         Date:  2022      Surgeon: Amari Gore):  Andre Raza MD    Procedure: Procedure(s):  CYSTOSCOPY, RIGHT RETROGRADE PYELOGRAM, RIGHT URETERAL STENT EXCHANGE    Medications prior to admission:   Prior to Admission medications    Medication Sig Start Date End Date Taking?  Authorizing Provider   polyethylene glycol (GLYCOLAX) 17 g packet Take 17 g by mouth daily as needed for Constipation 22  Teton Noss, DO   amitriptyline (ELAVIL) 50 MG tablet Take 1 tablet by mouth nightly 10/23/22   Norvin Boas, MD   carvedilol (COREG) 6.25 MG tablet Take 1 tablet by mouth 2 times daily (with meals) 22   Cece Adi, DO   levothyroxine (SYNTHROID) 125 MCG tablet TAKE ONE TABLET BY MOUTH ONE TIME DAILY BEFORE BREAKFAST 12/10/21   Ar Automatic Reconciliation   OLANZapine (ZYPREXA) 2.5 MG tablet Take 7.5 mg by mouth nightly    Ar Automatic Reconciliation       Current medications:    Current Facility-Administered Medications   Medication Dose Route Frequency Provider Last Rate Last Admin    ceFAZolin (ANCEF) 2000 mg in sterile water 20 mL IV syringe  2,000 mg IntraVENous On Call to [address], MD        lidocaine 1 % injection 1 mL  1 mL IntraDERmal Once PRN Sravan Love MD        fentaNYL (SUBLIMAZE) injection 100 mcg  100 mcg IntraVENous Once PRN Sravan Love MD        lactated ringers infusion   IntraVENous Continuous Sravan Love  mL/hr at 22 1347 New Bag at 22 1347    sodium chloride flush 0.9 % injection 5-40 mL  5-40 mL IntraVENous 2 times per day Sravan Love MD        sodium chloride flush 0.9 % injection 5-40 mL  5-40 mL IntraVENous PRN Sravan Love MD        0.9 % sodium chloride infusion   IntraVENous PRN Sravan Love MD        midazolam PF (VERSED) injection 2 mg  2 mg IntraVENous Once PRN MD Neil Beckettesin [facility]) extended release tablet 600 mg  600 mg Oral BID May Y Xochitl, [place]   600 mg at 12/08/22 2159    ipratropium-albuterol (DUONEB) nebulizer solution 1 ampule  1 ampule Inhalation Q4H WA May Y XochitlKarie devries   1 ampule at 12/09/22 1129    polyethylene glycol (GLYCOLAX) packet 17 g  17 g Oral Daily PRN Kenan Rodas MD        levothyroxine (SYNTHROID) tablet 125 mcg  125 mcg Oral Daily Kenan Rodas MD   125 mcg at 12/09/22 0545    piperacillin-tazobactam (ZOSYN) 3,375 mg in sodium chloride 0.9 % 50 mL IVPB (mini-bag)  3,375 mg IntraVENous Q8H Kenan Rodas MD 12.5 mL/hr at 12/09/22 1050 3,375 mg at 12/09/22 1050    [Held by provider] enoxaparin (LOVENOX) injection 40 mg  40 mg SubCUTAneous Q24H Kenan Rodas MD   40 mg at 12/08/22 1037    lidocaine (XYLOCAINE) 2 % uro-jet   Topical PRN Guido Holter, APRN - CNP        clonazePAM Abner Edge) tablet 4 mg  4 mg Oral QHS PRN Kenan Rodas MD   4 mg at 12/08/22 2205    OLANZapine (ZYPREXA) tablet 7.5 mg  7.5 mg Oral Nightly Kenan Rodas MD   7.5 mg at 12/08/22 2159    amitriptyline (ELAVIL) tablet 50 mg  50 mg Oral Nightly Dao Plank, DO   50 mg at 12/08/22 2159    carvedilol (COREG) tablet 6.25 mg  6.25 mg Oral BID WC Dao Plank, DO   6.25 mg at 12/09/22 0842    [Held by provider] OLANZapine (ZYPREXA) tablet 7.5 mg  7.5 mg Oral Nightly Dao Plank, DO        clonazePAM Elspeoscar Costain) tablet 2 mg  2 mg Oral Q8H PRN Dao Plank, DO   2 mg at 12/06/22 0535    sodium chloride flush 0.9 % injection 5-40 mL  5-40 mL IntraVENous 2 times per day Dao Ellis, DO   10 mL at 12/09/22 0844    sodium chloride flush 0.9 % injection 5-40 mL  5-40 mL IntraVENous PRN Dao Ellis, DO        0.9 % sodium chloride infusion   IntraVENous PRN Dao Ellis,         acetaminophen (TYLENOL) tablet 650 mg  650 mg Oral Q6H PRN Dao Ellis, DO Or    acetaminophen (TYLENOL) suppository 650 mg  650 mg Rectal Q6H PRN Erla Gables, DO           Allergies:  No Known Allergies    Problem List:    Patient Active Problem List   Diagnosis Code    Fecal incontinence R15.9    Hypoxemia R09.02    Weight loss R63.4    Rectal cancer ([facility] [address] C20    Bladder injury with open wound into cavity S37.20XA    Hypercalcemia of malignancy E83.52    Attention to ileostomy ([facility]) Z43.2    Acute blood loss anemia D62    Anemia D64.9    Abscess L02.91    Hypernatremia E87.0    Decreased activities of daily living (ADL) Z78.9    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 80    Pleural effusion J90    H/O urinary retention Z87.898    Severe protein-calorie malnutrition ([facility]) E43    Elevated LFTs R79.89    Urinary retention R33.9    Hydronephrosis of right kidney N13.30    C. 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    Hematuria R31.9    Major depression F32.9    Colostomy care ([facility]) Z43.3    Chronic indwelling John catheter Z97.8    Acute deep vein thrombosis (DVT) of non-extremity vein I82.90    Hypokalemia E87.6    Postoperative ileus ([facility]) K91.89, K56.7    Bowel incontinence R15.9    Constipation K59.00    Urinary tract infection associated with indwelling urethral catheter ([facility]) T83.511A, N39.0    Acute kidney injury superimposed on CKD ([facility]) N17.9, N18.9    Pelvic abscess in male ([facility]) K65.1    Infected prosthetic mesh of abdominal wall ([facility]) T85.79XA    Fever R50.9    Acute UTI (urinary tract infection) N39.0    Pseudopolyposis of colon without complication, unspecified part of colon ([facility]) K51.40    Mild episode of recurrent major depressive disorder ([facility]) F33.0    Leg weakness, bilateral R29.898       Past Medical History:        Diagnosis Date    Acute deep vein thrombosis (DVT) of non-extremity vein 5/20/2019    Cancer ([facility] [address]     Family history of malignant neoplasm of gastrointestinal tract     mother colon/ovarian cancer 67    Fecal incontinence     LAR syndrome    John catheter in place 2022    patient stated- \"catheter due to them nicking his bladder and it won't heal\"    Former cigarette smoker     History of rectal cancer     Hypothyroid     stable w/med    Infection and inflammatory reaction due to other internal prosthetic devices, implants and grafts, subsequent encounter 2017    abd wound/mesh colostomy    Insomnia     takes meds    Major depression     Osteoarthritis, hand     Personal history of colonic polyps 9/2013    x 1    Personal history of malignant neoplasm of rectum, rectosigmoid junction, 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    surgery and radiation    Psychiatric disorder     anxiety       Past Surgical History:        Procedure Laterality Date    COLONOSCOPY  last 2/3/15    Roberto--no polyps--3 year recall    COLONOSCOPY N/A 3/5/2021    COLONOSCOPY/BMI 19 performed by Rosalee Garnett MD at [facility] N/A 2/12/2018    COLONOSCOPY / BMI=21 performed by Monique Campos MD at [address] [place] [address]  2/12/2018         COLONOSCOPY THRU STOMA,LESN RMVL W/SNARE  3/5/2021         COLOSTOMY  5/11/16    CT RETROPERITONEAL PERC DRAIN  5/13/2021    CT RETROPERITONEAL PERC DRAIN 5/13/2021 [facility] SC[facility]    CT RETROPERITONEAL PERC DRAIN  5/20/2021    CT RETROPERITONEAL PERC DRAIN 5/20/2021 [facility] CT SCAN    GI  4/2016    Sacral nerve stimlator lead trial (unsucessful) and removal    HERNIA REPAIR      and Colostomy in May    HERNIA REPAIR  3/2015, 5/2016    IR ABSCESS EVAL THRU EXISTING CATH  11/24/2021    IR ABSCESS EVAL THRU EXISTING CATH  6/4/2021    IR ABSCESS EVAL THRU EXISTING CATH  5/20/2021    IR NEPHROSTOGRAM EXISTING ACCESS  12/10/2021    IR NEPHROSTOGRAM EXISTING ACCESS  10/4/2021    IR NEPHROSTOMY EXCHANGE CATHETER 2021    IR NEPHROSTOMY EXCHANGE CATHETER  11/3/2021    IR NEPHROSTOMY EXCHANGE CATHETER  10/28/2021    IR NEPHROSTOMY PERCUTANEOUS LEFT  2021    IR NEPHROSTOMY PERCUTANEOUS LEFT  2021    IR NEPHROSTOMY PERCUTANEOUS LEFT 2021 [facility]    IR REPLCE T CVC WO PORT SAME ACC  2019    IR TUBE CHANGE  2021    IR TUBE CHANGE  2021    IR TUBE CHANGE  2021    IR TUBE CHANGE  2021    IR TUNNELED CATHETER PLACEMENT GREATER THAN 5 YEARS  2019    IR TUNNELED CATHETER PLACEMENT GREATER THAN 5 YEARS  2019    IR TUNNELED CATHETER PLACEMENT GREATER THAN 5 YEARS 2019 [facility]    OTHER SURGICAL HISTORY Bilateral     cataracts      OTHER SURGICAL HISTORY  10/7/2013    Roberto---endorectal us    POLYPECTOMY      TOTAL COLECTOMY  2013    Roberto--lap LAR with coloproctostomy, mobilization splenic flexure, diverting loop ileostomy    TOTAL COLECTOMY Right 2014    for leak    VASCULAR SURGERY Left     single lumen port/subsequently removed       Social History:    Social History     Tobacco Use    Smoking status: Former     Packs/day: 1.00     Types: Cigarettes     Quit date: 1963     Years since quittin.1    Smokeless tobacco: Never   Substance Use Topics    Alcohol use: Not Currently     Alcohol/week: 11.7 standard drinks                                Counseling given: Not Answered      Vital Signs (Current):   Vitals:    22 0746 22 0905 22 1134 22 1353   BP: 100/64  125/84 (!) 150/80   Pulse: 79 82 70 73   Resp: 18 18 18 15   Temp: 97.2 °F (36.2 °C)  97.5 °F (36.4 °C) 97.7 °F (36.5 °C)   TempSrc: Oral  Axillary Oral   SpO2: 96% 100% 100% 96%   Weight:    110 lb (49.9 kg)   Height:    5' 8\" (1.727 m)                                              BP Readings from Last 3 Encounters:   22 (!) 150/80   22 (!) 140/85   22 (!) 155/87       NPO Status: BMI:   Wt Readings from Last 3 Encounters:   12/09/22 110 lb (49.9 kg)   11/14/22 123 lb 6.4 oz (56 kg)   11/03/22 123 lb (55.8 kg)     Body mass index is 16.73 kg/m². CBC:   Lab Results   Component Value Date/Time    WBC 5.4 12/09/2022 06:55 AM    RBC 3.67 12/09/2022 06:55 AM    HGB 10.6 12/09/2022 06:55 AM    HCT 33.6 12/09/2022 06:55 AM    MCV 91.6 12/09/2022 06:55 AM    RDW 14.7 12/09/2022 06:55 AM     12/09/2022 06:55 AM       CMP:   Lab Results   Component Value Date/Time     12/09/2022 06:55 AM    K 4.0 12/09/2022 06:55 AM     12/09/2022 06:55 AM    CO2 28 12/09/2022 06:55 AM    BUN 27 12/09/2022 06:55 AM    CREATININE 1.50 12/09/2022 06:55 AM    GFRAA >60 07/08/2022 03:32 AM    AGRATIO 1.0 02/23/2022 10:20 AM    LABGLOM 47 12/09/2022 06:55 AM    GLUCOSE 79 12/09/2022 06:55 AM    PROT 8.6 12/05/2022 09:35 AM    CALCIUM 8.6 12/09/2022 06:55 AM    BILITOT 0.5 12/05/2022 09:35 AM    ALKPHOS 90 12/05/2022 09:35 AM    ALKPHOS 72 02/23/2022 10:20 AM    AST 37 12/05/2022 09:35 AM    ALT 30 12/05/2022 09:35 AM       POC Tests: No results for input(s): POCGLU, POCNA, POCK, POCCL, POCBUN, POCHEMO, POCHCT in the last 72 hours.     Coags:   Lab Results   Component Value Date/Time    PROTIME 13.7 09/15/2021 07:45 PM    INR 1.0 09/15/2021 07:45 PM    APTT 28.1 08/27/2021 04:30 PM       HCG (If Applicable): No results found for: PREGTESTUR, PREGSERUM, HCG, HCGQUANT     ABGs:   Lab Results   Component Value Date/Time    PHART 7.35 08/27/2021 03:59 PM    PO2ART 192 08/27/2021 03:59 PM    XCF5JZW 35.1 08/27/2021 03:59 PM    SXL6OUR 19.2 08/27/2021 03:59 PM        Type & Screen (If Applicable):  No results found for: LABABO, LABRH    Drug/Infectious Status (If Applicable):  No results found for: HIV, HEPCAB    COVID-19 Screening (If Applicable):   Lab Results   Component Value Date/Time    COVID19 NOT DETECTED 12/08/2022 03:22 PM           Anesthesia Evaluation  Patient summary reviewed and Nursing notes reviewed no history of anesthetic complications:   Airway: Mallampati: II  TM distance: >3 FB   Neck ROM: full  Comment: beard  Mouth opening: > = 3 FB   Dental:    (+) caps      Pulmonary:Negative Pulmonary ROS breath sounds clear to auscultation                             Cardiovascular:  Exercise tolerance: no interval change,             Rate: normal                    Neuro/Psych:   (+) depression/anxiety             GI/Hepatic/Renal:   (+) renal disease:,          ROS comment: Rectal cancer . Endo/Other:    (+) hypothyroidism::., .                  ROS comment: Weak and frail appearing  Abdominal:             Vascular: Other Findings:           Anesthesia Plan      general     ASA 3     (ETT)  Induction: intravenous. Anesthetic plan and risks discussed with patient.                         Eloy Jimenes MD   12/9/2022

## 2022-12-09 NOTE — BRIEF OP NOTE
Brief Postoperative Note      Patient: Nathen Nolasco  YOB: 1945  MRN: [id number]    Date of Procedure: 12/9/2022    Pre-Op Diagnosis: Hydronephrosis [N13.30]    Post-Op Diagnosis: Same       Procedure(s):  CYSTOSCOPY, RIGHT RETROGRADE PYELOGRAM, RIGHT URETERAL STENT EXCHANGE    Surgeon(s):  Cam Mejia MD    Assistant:  * No surgical staff found *    Anesthesia: General    Estimated Blood Loss (mL): Minimal    Complications: None    Specimens:   * No specimens in log *    Implants:  Implant Name Type Inv.  Item Serial No.  Lot No. LRB No. Used Action   [employer] L26CM DIA8FR HYDR+ TAPR TIP NEREYDA [facility] - [id number]  [employer] L26CM DIA8FR HYDR+ TAPR TIP Katerin Simms  [facility]OLOGY- [id number] Right 1 Implanted         Drains:   Colostomy LLQ (Active)   Stomal Appliance 1 piece 12/09/22 0739   Stoma  Assessment SERGIO 12/08/22 0725   Peristomal Assessment SERGIO 12/08/22 0725   Treatment Bag change 12/08/22 0046   Stool Appearance Loose 12/09/22 0739   Stool Color Brown 12/09/22 0739   Stool Amount Smear 12/09/22 0739   Output (mL) 150 ml 12/08/22 0619       Urinary Catheter 12/09/22 2 Way (Active)       [REMOVED] NG/OG/NJ/NE Tube Nasogastric 16 fr Left nostril (Removed)   Surrounding Skin Clean, dry & intact 11/16/22 1556   Securement device Tape 11/16/22 1556   Status Clamped 11/16/22 1556   Placement Verified X-Ray (Initial) 11/15/22 1930   NG/OG/NJ/NE External Measurement (cm) 60 cm 11/15/22 1930   Drainage Appearance Brown 11/16/22 0716   Output (mL) 300 ml 11/16/22 1815       [REMOVED] Urinary Catheter 2 Way (Removed)       [REMOVED] Urinary Catheter John (Removed)       [REMOVED] Urinary Catheter John (Removed)       [REMOVED] Urinary Catheter 09/14/22 2 Way (Removed)       [REMOVED] Urinary Catheter 10/16/22 2 Way (Removed)       [REMOVED] Urinary Catheter 10/21/22 2 Way (Removed)       [REMOVED] Urinary Catheter 11/04/22 John (Removed)       [REMOVED] Urinary Catheter 2 Way (Removed)   $ Urethral catheter insertion $ Not inserted for procedure 11/18/22 0710   Catheter Indications Urinary retention (acute or chronic), continuous bladder irrigation or bladder outlet obstruction 11/18/22 0710   Site Assessment No urethral drainage 11/18/22 0710   Urine Color Yellow 11/18/22 0710   Urine Appearance Clear 11/18/22 0710   Collection Container Standard 11/18/22 0710   Securement Method Securing device (Describe) 11/18/22 0710   Catheter Care Completed Yes 11/18/22 0413   Catheter Best Practices  Drainage tube clipped to bed;Catheter secured to thigh; Tamper seal intact; Bag below bladder;Bag not on floor;Drainage bag less than half full;Lack of dependent loop in tubing 11/18/22 0710   Status Draining 11/18/22 0710   Output (mL) 400 mL 11/17/22 2152       [REMOVED] Urinary Catheter  (Removed)   $ Urethral catheter insertion $ Not inserted for procedure 12/06/22 1506   Catheter Indications Urinary retention (acute or chronic), continuous bladder irrigation or bladder outlet obstruction 12/06/22 1506   Site Assessment [place] 12/06/22 1506   Urine Color Cherry 12/06/22 1522   Urine Appearance Clear 12/06/22 1522   Collection Container Standard 12/06/22 1522   Securement Method Securing device (Describe) 12/06/22 1522   Catheter Care Completed Yes 12/06/22 0935   Catheter Best Practices  Drainage tube clipped to bed;Catheter secured to thigh; Tamper seal intact; Bag below bladder;Bag not on floor; Lack of dependent loop in tubing;Drainage bag less than half full 12/06/22 0935   Status Draining 12/06/22 1522       [REMOVED] Urinary Catheter 12/06/22 Coude (Removed)   $ Urethral catheter insertion $ Not inserted for procedure 12/09/22 0442   Catheter Indications Urinary retention (acute or chronic), continuous bladder irrigation or bladder outlet obstruction 12/09/22 0739   Site Assessment Pink 12/09/22 0739   Urine Color Yellow 12/09/22 0739   Urine Appearance Clear 12/09/22 0739 Collection Container Standard 12/09/22 0739   Securement Method Securing device (Describe) 12/09/22 0739   Catheter Care Completed Yes 12/09/22 0442   Catheter Best Practices  Drainage tube clipped to bed;Catheter secured to thigh; Bag below bladder; Tamper seal intact; Bag not on floor; Lack of dependent loop in tubing;Drainage bag less than half full 12/09/22 0739   Status Draining 12/09/22 0739   Output (mL) 175 mL 12/07/22 0736       Findings: Non-encrusted R ureter stent. No bladder masses. Open bladder neck    Intra-operative Interpretation of Fluoroscopy < 1 hour:     Right Retrograde Pyelogram:   Narrow R ureter from bladder up to about pelvic brim with dilation beyond pelvic brim. No extravasation of contrast / filling defect. R ureteral stent still necessary.      Electronically signed by Omero Pittman MD on 12/9/2022 at 3:17 PM

## 2022-12-09 NOTE — PROGRESS NOTES
Pt resting in bed at present time without complaints. NPO since midnight. Hourly rounds completed, all needs met this shift. Bed locked and low, call light within reach. Will give report to oncoming day shift nurse.

## 2022-12-09 NOTE — OP NOTE
[address] [facility]  OPERATIVE REPORT    Name:  Adriana Narayan  MR#:  [id number]  :  1945  ACCOUNT #:  [de-identified]  DATE OF SERVICE:  2022    PREOPERATIVE DIAGNOSIS:  Right hydronephrosis. POSTOPERATIVE DIAGNOSIS:  Right hydronephrosis. PROCEDURE PERFORMED:  Cystoscopy, right retrograde pyelogram, right ureteral stent exchange. SURGEON:  Irais Andrade MD    ASSISTANT:  None. ANESTHESIA:  General..    COMPLICATIONS:  None. SPECIMENS REMOVED:  None. IMPLANTS:  8 x 26 double-J right ureter stent without strings. ESTIMATED BLOOD LOSS:  Minimal.    DRAINS:  20-[place] Coude John catheter with 10 mL of sterile water in balloon. FINDINGS:  Not encrusted right ureter stent. No bladder masses. Open bladder neck. INTRAOPERATIVE INTERPRETATION OF FLUOROSCOPY LESS THAN 1 HOUR:  Right retrograde pyelogram interpretation - Narrow right ureter from the bladder up to about the pelvic brim with dilation of the ureter and renal pelvis beyond the pelvic brim on the right. No extravasation of contrast or filling defects noted. Right ureteral stent still deemed necessary and therefore exchange performed. INDICATIONS FOR OPERATIVE PROCEDURE:  The patient is a pleasant 70-year-old gentleman with a very complex past urologic history. Currently with a nonfunctional bladder managed with an indwelling John catheter as well as right ureteral obstruction managed with right ureteral stent. He presents today for routine stent exchange and retrograde pyelogram.  Last exchange was approximately 6 months ago. PROCEDURE:  After informed consent was obtained the patient was identified in the preoperative holding area. He was taken back to operating suite and placed on table in supine position. Time out was performed confirming correct patient and planned procedure. He received 2 g IV Ancef prior to smooth induction of general endotracheal anesthesia.   He was moved into dorsal lithotomy position, prepped and draped in usual sterile fashion. I began the case by inserting a 22-Mohawk rigid cystoscope with a 30-degree lens into the urethra and advanced it into the patient's bladder. Pancystoscopy was performed which showed defect in the posterior bladder wall consistent with his known fistula that drains into his lower pelvis. The ureteral orifices were in their orthotopic positions bilaterally. I did not note any concerning bladder masses. The bladder neck was widely patent without any evidence of stricture. The stent was not encrusted and extruding from the right ureteral orifice. I placed a sensor wire alongside the stent and advanced under fluoroscopic guidance in the right renal pelvis. I then backloaded the scope off the wire, reinserted the scope with a flexible stent grasper, grasped the end of the stent and removed the stent completely intact. At this point I then switched to my semi-rigid ureteroscope and inserted this under direct vision just long side the wire and placed it just inside the distal right ureter. I then injected 20 mL of 100% contrast to perform a right retrograde pyelogram which showed a very narrow ureter to the area of the pelvic brim, likely from the patient's prior history of radiation. There was significant ureter dilation past this area indicating poor drainage of the distal ureter. I therefore determined that a stent is still necessary and likely will be a permanent fixture for him that will need periodic changes. I therefore removed the semi-rigid ureteroscope. I loaded the wire onto the rigid cystoscope and then passed a new 8 x 26 double-J right ureter stent without strings over the wire under fluoroscopic guidance into the right renal pelvis. Once the stent was in position, I pulled the wire noting good curl in the renal pelvis as well as the bladder.   The retrograde helped me to determine the stent was in fact in the correct position in the renal pelvis. I then removed the cystoscope and placed a 20-[place] Coude John catheter. I inflated the balloon with 10 mL of sterile water. This was left to drainage and drained clear yellow at the end of the case. The patient was then awoken from anesthesia, transferred to PACU in stable condition. He tolerated the procedure well. There were no complications. All counts were correct at the end of procedure.         Ronald Daley MD      [id number]  D:  12/09/2022 15:28  T:  12/09/2022 18:30  JOB #:  [id number]

## 2022-12-09 NOTE — PROGRESS NOTES
Physical Therapy Note:    Attempted to see patient this PM for physical therapy treatment  session. Patient SINDY for a procedure. Will follow and re-attempt as schedule permits/patient available.  Thank you,    DEREK REINOSO, PT     Rehab Caseload Tracker

## 2022-12-09 NOTE — PERIOP NOTE
TRANSFER - OUT REPORT:    Verbal report given to Librado Marmolejo on Jayy Sutton  being transferred to [address] [place], [place] [id number] for routine progression of patient care       Report consisted of patients Situation, Background, Assessment and   Recommendations(SBAR). Information from the following report(s) Nurse Handoff Report, Cardiac Rhythm sinus rhythm, and Neuro Assessment was reviewed with the receiving nurse. Lines:   Peripheral IV 12/08/22 Right; Anterior Forearm (Active)   Site Assessment Clean, dry & intact 12/09/22 0442   Line Status Infusing 12/09/22 0442   Line Care Connections checked and tightened 12/09/22 0442   Phlebitis Assessment No symptoms 12/09/22 0442   Infiltration Assessment 0 12/09/22 0442   Alcohol Cap Used Yes 12/09/22 0442   Dressing Status Clean, dry & intact 12/09/22 0442   Dressing Type Transparent 12/09/22 0442        Opportunity for questions and clarification was provided. Patient transported with:   O2 @ 2 liters    VTE prophylaxis orders have been written for Jayy Sutton. Patient and family given floor number and nurses name. Family updated re: pt status after security code verified.

## 2022-12-09 NOTE — PROGRESS NOTES
Occupational Therapy Note:    Attempted to see patient this PM for occupational therapy treatment  session. Patient is currently off the floor. Will follow and re-attempt as schedule permits/patient available.  Thank you,    Brayan Carpenter, OT    Rehab Caseload Tracker

## 2022-12-09 NOTE — PROGRESS NOTES
Urology Progress Note    Admit Date: 12/5/2022    Subjective:     Patient has no new complaints. Pain controlled. Afebrile. Remains on IV antibiotics. Going to OR today. Objective:     Patient Vitals for the past 8 hrs:   BP Temp Temp src Pulse Resp SpO2 Height Weight   12/09/22 1353 (!) 150/80 97.7 °F (36.5 °C) Oral 73 15 96 % 5' 8\" (1.727 m) 110 lb (49.9 kg)   12/09/22 1134 125/84 97.5 °F (36.4 °C) Axillary 70 18 100 % -- --   12/09/22 0905 -- -- -- 82 18 100 % -- --   12/09/22 0746 100/64 97.2 °F (36.2 °C) Oral 79 18 96 % -- --     No intake/output data recorded. 12/07 1901 - 12/09 0700  In: -   Out: 2050 [Urine:1750]    Physical Exam:   BP (!) 150/80   Pulse 73   Temp 97.7 °F (36.5 °C) (Oral)   Resp 15   Ht 5' 8\" (1.727 m)   Wt 110 lb (49.9 kg)   SpO2 96%   BMI 16.73 kg/m²      GENERAL: No acute distress, Awake, Alert, Oriented X 3, Gait normal  CARDIAC: regular rate and rhythm  CHEST AND LUNG: Easy work of breathing, clear to auscultation bilaterally, no cyanosis  ABDOMEN: soft, non tender, non-distended  : John draining clear yellow urine.          Data Review   Recent Results (from the past 24 hour(s))   Respiratory Panel, Molecular, with COVID-19 (Restricted: peds pts or suitable admitted adults)    Collection Time: 12/08/22  3:22 PM    Specimen: Nasopharyngeal   Result Value Ref Range    Adenovirus by PCR NOT DETECTED NOTDET      Coronavirus 229E by PCR NOT DETECTED NOTDET      Coronavirus HKU1 by PCR NOT DETECTED NOTDET      Coronavirus NL63 by PCR NOT DETECTED NOTDET      Coronavirus OC43 by PCR NOT DETECTED NOTDET      SARS-CoV-2, PCR NOT DETECTED NOTDET      Human Metapneumovirus by PCR NOT DETECTED NOTDET      Rhinovirus Enterovirus PCR NOT DETECTED NOTDET      Influenza A by PCR NOT DETECTED NOTDET      Influenza B PCR NOT DETECTED NOTDET      Parainfluenza 1 PCR Detected (A) NOTDET      Parainfluenza 2 PCR NOT DETECTED NOTDET      Parainfluenza 3 PCR NOT DETECTED NOTDET Parainfluenza 4 PCR NOT DETECTED NOTDET      Respiratory Syncytial Virus by PCR NOT DETECTED NOTDET      Bordetella parapertussis by PCR NOT DETECTED NOTDET      Bordetella pertussis by PCR NOT DETECTED NOTDET      Chlamydophila Pneumonia PCR NOT DETECTED NOTDET      Mycoplasma pneumo by PCR NOT DETECTED NOTDET     PLEASE READ & DOCUMENT PPD TEST IN 24 HRS    Collection Time: 12/09/22 12:00 AM   Result Value Ref Range    PPD, (POC) Negative Negative    mm Induration 0 0 - 5 mm   Basic Metabolic Panel w/ Reflex to MG    Collection Time: 12/09/22  6:55 AM   Result Value Ref Range    Sodium 138 133 - 143 mmol/L    Potassium 4.0 3.5 - 5.1 mmol/L    Chloride 107 101 - 110 mmol/L    CO2 28 21 - 32 mmol/L    Anion Gap 3 2 - 11 mmol/L    Glucose 79 65 - 100 mg/dL    BUN 27 (H) 8 - 23 MG/DL    Creatinine 1.50 0.8 - 1.5 MG/DL    Est, Glom Filt Rate 47 (L) >60 ml/min/1.73m2    Calcium 8.6 8.3 - 10.4 MG/DL   CBC    Collection Time: 12/09/22  6:55 AM   Result Value Ref Range    WBC 5.4 4.3 - 11.1 K/uL    RBC 3.67 (L) 4.23 - 5.6 M/uL    Hemoglobin 10.6 (L) 13.6 - 17.2 g/dL    Hematocrit 33.6 (L) 41.1 - 50.3 %    MCV 91.6 82 - 102 FL    MCH 28.9 26.1 - 32.9 PG    MCHC 31.5 31.4 - 35.0 g/dL    RDW 14.7 (H) 11.9 - 14.6 %    Platelets 178 669 - 026 K/uL    MPV 9.5 9.4 - 12.3 FL    nRBC 0.00 0.0 - 0.2 K/uL           Assessment:     Principal Problem:    Urinary tract infection associated with indwelling urethral catheter (HCC)  Active Problems:    Mild episode of recurrent major depressive disorder (HCC)    Leg weakness, bilateral    Rectal cancer (HCC)    Decreased activities of daily living (ADL)    Hypothyroidism    Hydronephrosis of right kidney    Colostomy care (HCC)    Chronic indwelling Carr catheter  Resolved Problems:    * No resolved hospital problems. *    63 year old male with neurogenic bladder managed with indwelling carr catheter and R ureteral obstruction managed with R ureteral stent.   Presents today for stent exchange vs. Removal.    Plan:     -To OR for cystoscopy, right ureteral stent exchange vs. Removal, right retrograde pyelogram, carr catheter exchange  -Consented  -Antibiotic on call to OR  -NPO for procedure      Jaime Schumacher M.D.     [facility]  [facility] [address]  [place], [address]  Phone: [phone]  Fax: [phone]

## 2022-12-10 LAB
ANION GAP SERPL CALC-SCNC: 4 MMOL/L (ref 2–11)
BUN SERPL-MCNC: 25 MG/DL (ref 8–23)
CALCIUM SERPL-MCNC: 8.6 MG/DL (ref 8.3–10.4)
CHLORIDE SERPL-SCNC: 104 MMOL/L (ref 101–110)
CO2 SERPL-SCNC: 29 MMOL/L (ref 21–32)
CREAT SERPL-MCNC: 1.5 MG/DL (ref 0.8–1.5)
ERYTHROCYTE [DISTWIDTH] IN BLOOD BY AUTOMATED COUNT: 14.3 % (ref 11.9–14.6)
GLUCOSE SERPL-MCNC: 117 MG/DL (ref 65–100)
HCT VFR BLD AUTO: 34.3 % (ref 41.1–50.3)
HGB BLD-MCNC: 10.5 G/DL (ref 13.6–17.2)
MCH RBC QN AUTO: 28.4 PG (ref 26.1–32.9)
MCHC RBC AUTO-ENTMCNC: 30.6 G/DL (ref 31.4–35)
MCV RBC AUTO: 92.7 FL (ref 82–102)
NRBC # BLD: 0 K/UL (ref 0–0.2)
PLATELET # BLD AUTO: 249 K/UL (ref 150–450)
PMV BLD AUTO: 9.7 FL (ref 9.4–12.3)
POTASSIUM SERPL-SCNC: 4.1 MMOL/L (ref 3.5–5.1)
RBC # BLD AUTO: 3.7 M/UL (ref 4.23–5.6)
SODIUM SERPL-SCNC: 137 MMOL/L (ref 133–143)
WBC # BLD AUTO: 8.8 K/UL (ref 4.3–11.1)

## 2022-12-10 PROCEDURE — 94761 N-INVAS EAR/PLS OXIMETRY MLT: CPT

## 2022-12-10 PROCEDURE — 1100000000 HC RM PRIVATE

## 2022-12-10 PROCEDURE — 80048 BASIC METABOLIC PNL TOTAL CA: CPT

## 2022-12-10 PROCEDURE — 6370000000 HC RX 637 (ALT 250 FOR IP): Performed by: PHYSICIAN ASSISTANT

## 2022-12-10 PROCEDURE — 6370000000 HC RX 637 (ALT 250 FOR IP): Performed by: INTERNAL MEDICINE

## 2022-12-10 PROCEDURE — 94760 N-INVAS EAR/PLS OXIMETRY 1: CPT

## 2022-12-10 PROCEDURE — 6360000002 HC RX W HCPCS: Performed by: INTERNAL MEDICINE

## 2022-12-10 PROCEDURE — 99232 SBSQ HOSP IP/OBS MODERATE 35: CPT | Performed by: UROLOGY

## 2022-12-10 PROCEDURE — 36415 COLL VENOUS BLD VENIPUNCTURE: CPT

## 2022-12-10 PROCEDURE — 94640 AIRWAY INHALATION TREATMENT: CPT

## 2022-12-10 PROCEDURE — 2580000003 HC RX 258: Performed by: FAMILY MEDICINE

## 2022-12-10 PROCEDURE — 85027 COMPLETE CBC AUTOMATED: CPT

## 2022-12-10 PROCEDURE — 51702 INSERT TEMP BLADDER CATH: CPT

## 2022-12-10 PROCEDURE — 2580000003 HC RX 258: Performed by: INTERNAL MEDICINE

## 2022-12-10 PROCEDURE — 6370000000 HC RX 637 (ALT 250 FOR IP): Performed by: FAMILY MEDICINE

## 2022-12-10 RX ADMIN — IPRATROPIUM BROMIDE AND ALBUTEROL SULFATE 1 AMPULE: .5; 3 SOLUTION RESPIRATORY (INHALATION) at 11:26

## 2022-12-10 RX ADMIN — LEVOTHYROXINE SODIUM 125 MCG: 0.07 TABLET ORAL at 05:20

## 2022-12-10 RX ADMIN — PIPERACILLIN AND TAZOBACTAM 3375 MG: 3; .375 INJECTION, POWDER, FOR SOLUTION INTRAVENOUS at 09:33

## 2022-12-10 RX ADMIN — SODIUM CHLORIDE, PRESERVATIVE FREE 10 ML: 5 INJECTION INTRAVENOUS at 22:05

## 2022-12-10 RX ADMIN — AMITRIPTYLINE HYDROCHLORIDE 50 MG: 50 TABLET, FILM COATED ORAL at 22:02

## 2022-12-10 RX ADMIN — CLONAZEPAM 4 MG: 1 TABLET ORAL at 22:03

## 2022-12-10 RX ADMIN — SODIUM CHLORIDE, PRESERVATIVE FREE 10 ML: 5 INJECTION INTRAVENOUS at 09:29

## 2022-12-10 RX ADMIN — PIPERACILLIN AND TAZOBACTAM 3375 MG: 3; .375 INJECTION, POWDER, FOR SOLUTION INTRAVENOUS at 18:16

## 2022-12-10 RX ADMIN — IPRATROPIUM BROMIDE AND ALBUTEROL SULFATE 1 AMPULE: .5; 3 SOLUTION RESPIRATORY (INHALATION) at 14:59

## 2022-12-10 RX ADMIN — CARVEDILOL 6.25 MG: 6.25 TABLET, FILM COATED ORAL at 18:16

## 2022-12-10 RX ADMIN — IPRATROPIUM BROMIDE AND ALBUTEROL SULFATE 1 AMPULE: .5; 3 SOLUTION RESPIRATORY (INHALATION) at 07:30

## 2022-12-10 RX ADMIN — IPRATROPIUM BROMIDE AND ALBUTEROL SULFATE 1 AMPULE: .5; 3 SOLUTION RESPIRATORY (INHALATION) at 19:00

## 2022-12-10 RX ADMIN — GUAIFENESIN 600 MG: 600 TABLET ORAL at 09:26

## 2022-12-10 RX ADMIN — GUAIFENESIN 600 MG: 600 TABLET ORAL at 22:02

## 2022-12-10 RX ADMIN — CARVEDILOL 6.25 MG: 6.25 TABLET, FILM COATED ORAL at 09:26

## 2022-12-10 RX ADMIN — PIPERACILLIN AND TAZOBACTAM 3375 MG: 3; .375 INJECTION, POWDER, FOR SOLUTION INTRAVENOUS at 02:08

## 2022-12-10 RX ADMIN — OLANZAPINE 7.5 MG: 2.5 TABLET, FILM COATED ORAL at 22:03

## 2022-12-10 ASSESSMENT — PAIN SCALES - GENERAL: PAINLEVEL_OUTOF10: 0

## 2022-12-10 NOTE — PROGRESS NOTES
Pt resting in bed at present time without complaints. Hourly rounds completed, all needs met this shift. Bed locked and low, call light within reach. Will give report to oncoming day shift nurse.

## 2022-12-10 NOTE — PROGRESS NOTES
Urology Progress Note    Admit Date: 12/5/2022    Subjective:     Patient has no new complaints. Afebrile. Pain controlled. Tolerated stent exchange and carr placement yesterday in OR    Objective:     Patient Vitals for the past 8 hrs:   BP Temp Temp src Pulse Resp SpO2   12/10/22 0732 -- -- -- 67 16 96 %   12/10/22 0335 100/61 97.5 °F (36.4 °C) Oral 81 16 95 %     No intake/output data recorded.   12/08 1901 - 12/10 0700  In: 700 [I.V.:700]  Out: 1200 [Urine:1200]    Physical Exam:   /61   Pulse 67   Temp 97.5 °F (36.4 °C) (Oral)   Resp 16   Ht 5' 8\" (1.727 m)   Wt 110 lb (49.9 kg)   SpO2 96%   BMI 16.73 kg/m²      GENERAL: No acute distress, Awake, Alert, Oriented X 3, Gait normal  CARDIAC: regular rate and rhythm  CHEST AND LUNG: Easy work of breathing, clear to auscultation bilaterally, no cyanosis  ABDOMEN: soft, non tender, non-distended, positive bowel sounds, no organomegaly, no palpable masses, no guarding, no rebound tenderness  : Carr draining clear yellow urine          Data Review   Recent Results (from the past 24 hour(s))   Basic Metabolic Panel w/ Reflex to MG    Collection Time: 12/10/22  6:07 AM   Result Value Ref Range    Sodium 137 133 - 143 mmol/L    Potassium 4.1 3.5 - 5.1 mmol/L    Chloride 104 101 - 110 mmol/L    CO2 29 21 - 32 mmol/L    Anion Gap 4 2 - 11 mmol/L    Glucose 117 (H) 65 - 100 mg/dL    BUN 25 (H) 8 - 23 MG/DL    Creatinine 1.50 0.8 - 1.5 MG/DL    Est, Glom Filt Rate 47 (L) >60 ml/min/1.73m2    Calcium 8.6 8.3 - 10.4 MG/DL   CBC    Collection Time: 12/10/22  6:07 AM   Result Value Ref Range    WBC 8.8 4.3 - 11.1 K/uL    RBC 3.70 (L) 4.23 - 5.6 M/uL    Hemoglobin 10.5 (L) 13.6 - 17.2 g/dL    Hematocrit 34.3 (L) 41.1 - 50.3 %    MCV 92.7 82 - 102 FL    MCH 28.4 26.1 - 32.9 PG    MCHC 30.6 (L) 31.4 - 35.0 g/dL    RDW 14.3 11.9 - 14.6 %    Platelets 314 304 - 548 K/uL    MPV 9.7 9.4 - 12.3 FL    nRBC 0.00 0.0 - 0.2 K/uL           Assessment:     Principal Problem:    Urinary tract infection associated with indwelling urethral catheter (HCC)  Active Problems:    Mild episode of recurrent major depressive disorder (HCC)    Leg weakness, bilateral    Rectal cancer (HCC)    Decreased activities of daily living (ADL)    Hypothyroidism    Hydronephrosis of right kidney    Colostomy care (HCC)    Chronic indwelling Carr catheter  Resolved Problems:    * No resolved hospital problems. *    63 year old male with complex past urologic history and R ureteral obstruction managed with R ureteral stent and urinary retention / bladder injury / fistula managed with chronic carr catheter. Currently admitted for UTI    Plan:     -Continue care per primary team.  -Successful stent / carr exchange on 12/9/22  -OK for D/C from urology standpoint  -Will arrange q3 week carr changes in office per patient preference and will arrange next stent exchange in 6 months.  -Will sign off at this time. Call with questions. Jaime Garcia M.D.     [facility]  [facility] [address]  [place], [address]  Phone: [phone]  Fax: [phone]

## 2022-12-10 NOTE — PROGRESS NOTES
Hospitalist Progress Note   Admit Date:  2022  8:32 AM   Name:  Juan Nolasco   Age:  76 y.o. Sex:  male  :  1945   MRN:  [id number]   Room:  [facility]/    Presenting Complaint: Fatigue     Reason(s) for Admission: Generalized weakness [P27.0]  Complicated UTI (urinary tract infection) [N39.0]  Urinary tract infection associated with indwelling urethral catheter, [facility] [R18.542C, N39.0]     Hospital Course:   Patient is a 76 y.o. male who presented to the ED for cc generalized weakness since this past Friday. Nothing seems to make better or worse. Denies difficulty with vision or lid lag. Pt has a PMHx significant for rectal CA with colostomy no longer on treatment, urostomies related to radiation, due for right stent exchange this month, hypothyroidism, chronic muffled / slurred speech, admission from 10/16-10/27 for lower extremity weakness s/p IVIG, multiple abdominal surgeries with recent SBO (dc'd 2022). In ER UA positive with creatinine 1.60, afebrile. Given acute UTI with hx of complicated UTI (pseudomonas aeruginosa and e faecalis), pt admitted and started on zosyn / vanco.     Resp panel + parainfluenza     Subjective & 24hr Events (12/10/22): \"When can I go to rehab? \"  Motivated thin 77y.o. WM in bed in NAD    Admits to improved chest congestion; denies cough, fever, chills, unexplained wt loss, abd pain, CP, hematuria. Assessment & Plan:     Principal Problem:    Pseudomonas / E.  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 Garett Lott  - urology will arrange for q3week carr changes in office and next stent exchange in 6 mths  - ok from urology standpoint for discharge      Hx of rectal cancer / s/p colostomy  - aware; cont colostomy care      Cough / pulmonary hypostasis / parainfluenza respiratory infection  - cont supportive care  - clinically improved      HTN  - acceptable control      Depression  - mood stable      Debility  - appreciate PT/OT assistance, recommending STR        Anticipated discharge needs:    - STR likely Monday as pt will need private room for parainfluenza resp infection    Diet:  ADULT DIET; Regular  DVT PPx: lovenox held for urologic procedure in AM; SCD  Code status: Full Code    Hospital Problems:  Principal Problem:    Urinary tract infection associated with indwelling urethral catheter ([address]  Active Problems:    Mild episode of recurrent major depressive disorder (HCC)    Leg weakness, bilateral    Rectal cancer (HCC)    Decreased activities of daily living (ADL)    Hypothyroidism    Hydronephrosis of right kidney    Colostomy care (HCC)    Chronic indwelling Carr catheter  Resolved Problems:    * No resolved hospital problems.  *      Objective:   Patient Vitals for the past 24 hrs:   Temp Pulse Resp BP SpO2   12/10/22 1142 98.2 °F (36.8 °C) 83 16 100/65 98 %   12/10/22 1127 -- 71 16 -- 96 %   12/10/22 0748 97.7 °F (36.5 °C) 64 16 117/68 98 %   12/10/22 0732 -- 67 16 -- 96 %   12/10/22 0335 97.5 °F (36.4 °C) 81 16 100/61 95 %   12/09/22 2323 97.5 °F (36.4 °C) 66 14 (!) 94/59 96 %   12/09/22 2029 -- 70 17 -- 94 %   12/09/22 1934 97.3 °F (36.3 °C) 76 16 (!) 100/57 98 %   12/09/22 1726 97.5 °F (36.4 °C) 70 16 (!) 154/83 94 %   12/09/22 1620 -- 50 17 -- --   12/09/22 1615 -- 61 16 (!) 163/88 --   12/09/22 1610 -- 58 17 (!) 171/77 100 %   12/09/22 1605 -- 61 16 (!) 150/74 100 %   12/09/22 1600 -- 60 16 (!) 153/81 100 %   12/09/22 1555 -- 67 17 (!) 156/77 100 %   12/09/22 1551 -- 67 -- -- --   12/09/22 1545 -- 63 16 (!) 140/74 100 %   12/09/22 1540 -- 64 17 133/70 98 %   12/09/22 1535 -- 64 17 125/63 97 %   12/09/22 1530 -- 68 16 120/72 100 %   12/09/22 1525 -- 77 16 116/73 100 %   12/09/22 1521 97.8 °F (36.6 °C) 60 14 (!) 144/71 100 %   12/09/22 1353 97.7 °F (36.5 °C) 73 15 (!) 150/80 96 %         Oxygen Therapy  SpO2: 98 %  Pulse Oximetry Type: Intermittent  Pulse via Oximetry: 61 beats per minute  Pulse Oximeter Device Mode: Continuous  Pulse Oximeter Device Location: Finger  O2 Device: None (Room air)  Skin Assessment: Clean, dry, & intact  Skin Protection for O2 Device: N/A  O2 Flow Rate (L/min): 2 L/min  Blood Gas  Performed?: No  Oxygen Therapy: None (Room air)    Estimated body mass index is 16.73 kg/m² as calculated from the following:    Height as of this encounter: 5' 8\" (1.727 m). Weight as of this encounter: 110 lb (49.9 kg). Intake/Output Summary (Last 24 hours) at 12/10/2022 1320  Last data filed at 12/10/2022 0612  Gross per 24 hour   Intake 700 ml   Output 1200 ml   Net -500 ml           Physical Exam:     Blood pressure 100/65, pulse 83, temperature 98.2 °F (36.8 °C), temperature source Oral, resp. rate 16, height 5' 8\" (1.727 m), weight 110 lb (49.9 kg), SpO2 98 %. General:    Thin WM in NAD, ambulating halls with PT  Head:  Normocephalic, atraumatic  Eyes:  Sclerae appear normal.  Pupils equally round. ENT:  Nares appear normal, no drainage. Moist oral mucosa  Neck:  No restricted ROM. Trachea midline   CV:   RRR. No m/r/g. No jugular venous distension. Lungs:   Improved rhonchi; no wheezing / accessory muscles used  Abdomen:   Colostomy in place, brown fecal matters noted; +BSx4  Extremities: No cyanosis or clubbing. No edema; + mvmt x 4  Skin:     No rashes and normal coloration. Warm and dry. Neuro:  Chronic garbled speech; A&Ox3 no acute focal neuro deficits  Psych:  Normal mood and affect.       I have personally reviewed labs and tests showing:  Recent Labs:  Recent Results (from the past 48 hour(s))   Respiratory Panel, Molecular, with COVID-19 (Restricted: peds pts or suitable admitted adults)    Collection Time: 12/08/22  3:22 PM    Specimen: Nasopharyngeal   Result Value Ref Range    Adenovirus by PCR NOT DETECTED NOTDET      Coronavirus 229E by PCR NOT DETECTED NOTDET      Coronavirus HKU1 by PCR NOT DETECTED NOTDET      Coronavirus NL63 by PCR NOT DETECTED NOTDET      Coronavirus OC43 by PCR NOT DETECTED NOTDET      SARS-CoV-2, PCR NOT DETECTED NOTDET      Human Metapneumovirus by PCR NOT DETECTED NOTDET      Rhinovirus Enterovirus PCR NOT DETECTED NOTDET      Influenza A by PCR NOT DETECTED NOTDET      Influenza B PCR NOT DETECTED NOTDET      Parainfluenza 1 PCR Detected (A) NOTDET      Parainfluenza 2 PCR NOT DETECTED NOTDET      Parainfluenza 3 PCR NOT DETECTED NOTDET      Parainfluenza 4 PCR NOT DETECTED NOTDET      Respiratory Syncytial Virus by PCR NOT DETECTED NOTDET      Bordetella parapertussis by PCR NOT DETECTED NOTDET      Bordetella pertussis by PCR NOT DETECTED NOTDET      Chlamydophila Pneumonia PCR NOT DETECTED NOTDET      Mycoplasma pneumo by PCR NOT DETECTED NOTDET     PLEASE READ & DOCUMENT PPD TEST IN 24 HRS    Collection Time: 12/09/22 12:00 AM   Result Value Ref Range    PPD, (POC) Negative Negative    mm Induration 0 0 - 5 mm   Basic Metabolic Panel w/ Reflex to MG    Collection Time: 12/09/22  6:55 AM   Result Value Ref Range    Sodium 138 133 - 143 mmol/L    Potassium 4.0 3.5 - 5.1 mmol/L    Chloride 107 101 - 110 mmol/L    CO2 28 21 - 32 mmol/L    Anion Gap 3 2 - 11 mmol/L    Glucose 79 65 - 100 mg/dL    BUN 27 (H) 8 - 23 MG/DL    Creatinine 1.50 0.8 - 1.5 MG/DL    Est, Glom Filt Rate 47 (L) >60 ml/min/1.73m2    Calcium 8.6 8.3 - 10.4 MG/DL   CBC    Collection Time: 12/09/22  6:55 AM   Result Value Ref Range    WBC 5.4 4.3 - 11.1 K/uL    RBC 3.67 (L) 4.23 - 5.6 M/uL    Hemoglobin 10.6 (L) 13.6 - 17.2 g/dL    Hematocrit 33.6 (L) 41.1 - 50.3 %    MCV 91.6 82 - 102 FL    MCH 28.9 26.1 - 32.9 PG    MCHC 31.5 31.4 - 35.0 g/dL    RDW 14.7 (H) 11.9 - 14.6 %    Platelets 402 579 - 268 K/uL    MPV 9.5 9.4 - 12.3 FL    nRBC 0.00 0.0 - 0.2 K/uL   Basic Metabolic Panel w/ Reflex to MG    Collection Time: 12/10/22  6:07 AM   Result Value Ref Range    Sodium 137 133 - 143 mmol/L    Potassium 4.1 3.5 - 5.1 mmol/L    Chloride 104 101 - 110 mmol/L    CO2 29 21 - 32 mmol/L    Anion Gap 4 2 - 11 mmol/L    Glucose 117 (H) 65 - 100 mg/dL    BUN 25 (H) 8 - 23 MG/DL    Creatinine 1.50 0.8 - 1.5 MG/DL    Est, Glom Filt Rate 47 (L) >60 ml/min/1.73m2    Calcium 8.6 8.3 - 10.4 MG/DL   CBC    Collection Time: 12/10/22  6:07 AM   Result Value Ref Range    WBC 8.8 4.3 - 11.1 K/uL    RBC 3.70 (L) 4.23 - 5.6 M/uL    Hemoglobin 10.5 (L) 13.6 - 17.2 g/dL    Hematocrit 34.3 (L) 41.1 - 50.3 %    MCV 92.7 82 - 102 FL    MCH 28.4 26.1 - 32.9 PG    MCHC 30.6 (L) 31.4 - 35.0 g/dL    RDW 14.3 11.9 - 14.6 %    Platelets 174 871 - 209 K/uL    MPV 9.7 9.4 - 12.3 FL    nRBC 0.00 0.0 - 0.2 K/uL       I have personally reviewed imaging studies showing: Other Studies:  XR CHEST PORTABLE   Final Result      1. No acute cardiopulmonary process. CPT code(s) 58832                  XR CHEST PORTABLE   Final Result   1. No acute abnormality in the chest.   2. No acute abdominal or pelvic abnormality. Interval decrease in the small   bowel dilation. XR ABDOMEN (KUB) (SINGLE AP VIEW)   Final Result   1. No acute abnormality in the chest.   2. No acute abdominal or pelvic abnormality. Interval decrease in the small   bowel dilation.           Current Meds:  Current Facility-Administered Medications   Medication Dose Route Frequency    lactated ringers infusion   IntraVENous Continuous    hydrALAZINE (APRESOLINE) injection 10 mg  10 mg IntraVENous Q8H PRN    guaiFENesin (MUCINEX) extended release tablet 600 mg  600 mg Oral BID ipratropium-albuterol (DUONEB) nebulizer solution 1 ampule  1 ampule Inhalation Q4H WA    polyethylene glycol (GLYCOLAX) packet 17 g  17 g Oral Daily PRN    levothyroxine (SYNTHROID) tablet 125 mcg  125 mcg Oral Daily    piperacillin-tazobactam (ZOSYN) 3,375 mg in sodium chloride 0.9 % 50 mL IVPB (mini-bag)  3,375 mg IntraVENous Q8H    [Held by provider] enoxaparin (LOVENOX) injection 40 mg  40 mg SubCUTAneous Q24H    lidocaine (XYLOCAINE) 2 % uro-jet   Topical PRN    clonazePAM (KLONOPIN) tablet 4 mg  4 mg Oral QHS PRN    OLANZapine (ZYPREXA) tablet 7.5 mg  7.5 mg Oral Nightly    amitriptyline (ELAVIL) tablet 50 mg  50 mg Oral Nightly    carvedilol (COREG) tablet 6.25 mg  6.25 mg Oral BID WC    [Held by provider] OLANZapine (ZYPREXA) tablet 7.5 mg  7.5 mg Oral Nightly    clonazePAM (KLONOPIN) tablet 2 mg  2 mg Oral Q8H PRN    sodium chloride flush 0.9 % injection 5-40 mL  5-40 mL IntraVENous 2 times per day    sodium chloride flush 0.9 % injection 5-40 mL  5-40 mL IntraVENous PRN    0.9 % sodium chloride infusion   IntraVENous PRN    acetaminophen (TYLENOL) tablet 650 mg  650 mg Oral Q6H PRN    Or    acetaminophen (TYLENOL) suppository 650 mg  650 mg Rectal Q6H PRN       Signed:  GENIA Joya    Part of this note may have been written by using a voice dictation software. The note has been proof read but may still contain some grammatical/other typographical errors.

## 2022-12-10 NOTE — ANESTHESIA POSTPROCEDURE EVALUATION
Department of Anesthesiology  Postprocedure Note    Patient: Shyam Zepeda  MRN: [id number]  YOB: 1945  Date of evaluation: 12/9/2022      Procedure Summary     Date: 12/09/22 Room / Location: [facility] MAIN OR 01 [facility]ST[facility] / [facility] MAIN OR    Anesthesia Start: 4903 Anesthesia Stop: 7280    Procedure: CYSTOSCOPY, RIGHT RETROGRADE PYELOGRAM, RIGHT URETERAL STENT EXCHANGE (Right) Diagnosis:       Hydronephrosis      (Hydronephrosis [N13.30])    Providers: Buffy Spaulding MD Responsible Provider: Danielle Shipley MD    Anesthesia Type: General ASA Status: 3          Anesthesia Type: General    Romina Phase I: Romina Score: 7    Romina Phase II: Romina Score: 9      Anesthesia Post Evaluation    Patient location during evaluation: PACU  Patient participation: complete - patient participated  Level of consciousness: awake and alert  Airway patency: patent  Nausea: well controlled. Complications: no  Cardiovascular status: acceptable.   Respiratory status: acceptable  Hydration status: stable

## 2022-12-11 LAB
ANION GAP SERPL CALC-SCNC: 4 MMOL/L (ref 2–11)
BACTERIA SPEC CULT: NORMAL
BACTERIA SPEC CULT: NORMAL
BUN SERPL-MCNC: 26 MG/DL (ref 8–23)
CALCIUM SERPL-MCNC: 8.5 MG/DL (ref 8.3–10.4)
CHLORIDE SERPL-SCNC: 108 MMOL/L (ref 101–110)
CO2 SERPL-SCNC: 29 MMOL/L (ref 21–32)
CREAT SERPL-MCNC: 1.4 MG/DL (ref 0.8–1.5)
ERYTHROCYTE [DISTWIDTH] IN BLOOD BY AUTOMATED COUNT: 14.4 % (ref 11.9–14.6)
GLUCOSE SERPL-MCNC: 84 MG/DL (ref 65–100)
HCT VFR BLD AUTO: 33.1 % (ref 41.1–50.3)
HGB BLD-MCNC: 10.2 G/DL (ref 13.6–17.2)
MCH RBC QN AUTO: 28.7 PG (ref 26.1–32.9)
MCHC RBC AUTO-ENTMCNC: 30.8 G/DL (ref 31.4–35)
MCV RBC AUTO: 93 FL (ref 82–102)
NRBC # BLD: 0 K/UL (ref 0–0.2)
PLATELET # BLD AUTO: 253 K/UL (ref 150–450)
PMV BLD AUTO: 9.4 FL (ref 9.4–12.3)
POTASSIUM SERPL-SCNC: 4.2 MMOL/L (ref 3.5–5.1)
RBC # BLD AUTO: 3.56 M/UL (ref 4.23–5.6)
SERVICE CMNT-IMP: NORMAL
SERVICE CMNT-IMP: NORMAL
SODIUM SERPL-SCNC: 141 MMOL/L (ref 133–143)
WBC # BLD AUTO: 5 K/UL (ref 4.3–11.1)

## 2022-12-11 PROCEDURE — 94640 AIRWAY INHALATION TREATMENT: CPT

## 2022-12-11 PROCEDURE — 1100000000 HC RM PRIVATE

## 2022-12-11 PROCEDURE — 2580000003 HC RX 258: Performed by: INTERNAL MEDICINE

## 2022-12-11 PROCEDURE — 6370000000 HC RX 637 (ALT 250 FOR IP): Performed by: FAMILY MEDICINE

## 2022-12-11 PROCEDURE — 6370000000 HC RX 637 (ALT 250 FOR IP): Performed by: PHYSICIAN ASSISTANT

## 2022-12-11 PROCEDURE — 36415 COLL VENOUS BLD VENIPUNCTURE: CPT

## 2022-12-11 PROCEDURE — 6370000000 HC RX 637 (ALT 250 FOR IP): Performed by: INTERNAL MEDICINE

## 2022-12-11 PROCEDURE — 94761 N-INVAS EAR/PLS OXIMETRY MLT: CPT

## 2022-12-11 PROCEDURE — 94760 N-INVAS EAR/PLS OXIMETRY 1: CPT

## 2022-12-11 PROCEDURE — 80048 BASIC METABOLIC PNL TOTAL CA: CPT

## 2022-12-11 PROCEDURE — 85027 COMPLETE CBC AUTOMATED: CPT

## 2022-12-11 PROCEDURE — 6360000002 HC RX W HCPCS: Performed by: INTERNAL MEDICINE

## 2022-12-11 PROCEDURE — 2580000003 HC RX 258: Performed by: FAMILY MEDICINE

## 2022-12-11 RX ADMIN — CARVEDILOL 6.25 MG: 6.25 TABLET, FILM COATED ORAL at 09:21

## 2022-12-11 RX ADMIN — SODIUM CHLORIDE, PRESERVATIVE FREE 10 ML: 5 INJECTION INTRAVENOUS at 09:19

## 2022-12-11 RX ADMIN — IPRATROPIUM BROMIDE AND ALBUTEROL SULFATE 1 AMPULE: .5; 3 SOLUTION RESPIRATORY (INHALATION) at 15:18

## 2022-12-11 RX ADMIN — AMITRIPTYLINE HYDROCHLORIDE 50 MG: 50 TABLET, FILM COATED ORAL at 22:11

## 2022-12-11 RX ADMIN — ENOXAPARIN SODIUM 40 MG: 100 INJECTION SUBCUTANEOUS at 16:34

## 2022-12-11 RX ADMIN — CARVEDILOL 6.25 MG: 6.25 TABLET, FILM COATED ORAL at 16:34

## 2022-12-11 RX ADMIN — PIPERACILLIN AND TAZOBACTAM 3375 MG: 3; .375 INJECTION, POWDER, FOR SOLUTION INTRAVENOUS at 02:17

## 2022-12-11 RX ADMIN — CLONAZEPAM 4 MG: 1 TABLET ORAL at 22:11

## 2022-12-11 RX ADMIN — GUAIFENESIN 600 MG: 600 TABLET ORAL at 09:21

## 2022-12-11 RX ADMIN — IPRATROPIUM BROMIDE AND ALBUTEROL SULFATE 1 AMPULE: .5; 3 SOLUTION RESPIRATORY (INHALATION) at 11:46

## 2022-12-11 RX ADMIN — SODIUM CHLORIDE, PRESERVATIVE FREE 10 ML: 5 INJECTION INTRAVENOUS at 22:15

## 2022-12-11 RX ADMIN — LEVOTHYROXINE SODIUM 125 MCG: 0.07 TABLET ORAL at 05:14

## 2022-12-11 RX ADMIN — OLANZAPINE 7.5 MG: 2.5 TABLET, FILM COATED ORAL at 22:11

## 2022-12-11 RX ADMIN — GUAIFENESIN 600 MG: 600 TABLET ORAL at 22:11

## 2022-12-11 RX ADMIN — IPRATROPIUM BROMIDE AND ALBUTEROL SULFATE 1 AMPULE: .5; 3 SOLUTION RESPIRATORY (INHALATION) at 20:02

## 2022-12-11 RX ADMIN — IPRATROPIUM BROMIDE AND ALBUTEROL SULFATE 1 AMPULE: .5; 3 SOLUTION RESPIRATORY (INHALATION) at 07:39

## 2022-12-11 ASSESSMENT — PAIN SCALES - GENERAL
PAINLEVEL_OUTOF10: 0
PAINLEVEL_OUTOF10: 0

## 2022-12-11 NOTE — PROGRESS NOTES
Hospitalist Progress Note   Admit Date:  2022  8:32 AM   Name:  Victorina Nolasco   Age:  76 y.o. Sex:  male  :  1945   MRN:  [id number]   Room:  [facility]/    Presenting Complaint: Fatigue     Reason(s) for Admission: Generalized weakness [G11.1]  Complicated UTI (urinary tract infection) [N39.0]  Urinary tract infection associated with indwelling urethral catheter, initial encounter ([facility].) [L92.271H, N39.0]     Hospital Course:   Patient is a 76 y.o. male who presented to the ED for cc generalized weakness since this past Friday. Nothing seems to make better or worse. Denies difficulty with vision or lid lag. Pt has a PMHx significant for rectal CA with colostomy no longer on treatment, urostomies related to radiation, due for right stent exchange this month, hypothyroidism, chronic muffled / slurred speech, admission from 10/16-10/27 for lower extremity weakness s/p IVIG, multiple abdominal surgeries with recent SBO (dc'd 2022). In ER UA positive with creatinine 1.60, afebrile. Given acute UTI with hx of complicated UTI (pseudomonas aeruginosa and e faecalis), pt admitted and started on zosyn / vanco.    Seen in consultation for persistent right hydronephrosis, s/p cystoscopy / right retrograde pyelogram / right ureteral stent exchange with Dr. Harper Alegria 2022.     Resp panel + parainfluenza     Subjective & 24hr Events (22): \"When can I go to rehab? \"  Motivated thin 77y.o. WM in bed in NAD    Admits to improved chest congestion; denies cough, fever, chills, unexplained wt loss, abd pain, CP, hematuria. Assessment & Plan:     Principal Problem:    Pseudomonas / E.  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 Jonnie Polanco  - urology will arrange for q3week carr changes in office and next stent exchange in 6 mths  - ok from urology standpoint for discharge      Hx of rectal cancer / s/p colostomy  - aware; cont colostomy care      Cough / pulmonary hypostasis / parainfluenza respiratory infection  - symptoms resolved      HTN  - acceptable control      Depression  - mood stable      Debility  - appreciate PT/OT assistance, recommending STR        Anticipated discharge needs:    - STR in AM    Diet:  ADULT DIET; Regular  DVT PPx: lovenox   Code status: Full Code    Hospital Problems:  Principal Problem:    Urinary tract infection associated with indwelling urethral catheter (HCC)  Active Problems:    Mild episode of recurrent major depressive disorder (HCC)    Leg weakness, bilateral    Rectal cancer (HCC)    Decreased activities of daily living (ADL)    Hypothyroidism    Hydronephrosis of right kidney    Colostomy care (HCC)    Chronic indwelling Carr catheter  Resolved Problems:    * No resolved hospital problems. *      Objective:   Patient Vitals for the past 24 hrs:   Temp Pulse Resp BP SpO2   12/11/22 1146 -- 69 16 -- 96 %   12/11/22 1108 97.3 °F (36.3 °C) 77 17 137/88 98 %   12/11/22 0806 99 °F (37.2 °C) 94 -- (!) 147/93 99 %   12/11/22 0739 -- 82 16 -- 95 %   12/11/22 0322 97.5 °F (36.4 °C) 65 19 116/65 94 %   12/10/22 2355 97.3 °F (36.3 °C) 68 18 (!) 107/58 94 %   12/10/22 1932 98.2 °F (36.8 °C) 83 18 107/60 96 %   12/10/22 1901 -- 68 16 -- 96 %   12/10/22 1630 97.3 °F (36.3 °C) 86 18 109/89 97 %   12/10/22 1502 -- 77 17 -- 92 %         Oxygen Therapy  SpO2: 96 %  Pulse Oximetry Type:  Intermittent  Pulse via Oximetry: 61 beats per minute  Pulse Oximeter Device Mode: Continuous  Pulse Oximeter Device Location: Finger  O2 Device: None (Room air)  Skin Assessment: Clean, dry, & intact  Skin Protection for O2 Device: N/A  O2 Flow Rate (L/min): 2 L/min  Blood Gas  Performed?: No  Oxygen Therapy: None (Room air)    Estimated body mass index is 16.73 kg/m² as calculated from the following:    Height as of this encounter: 5' 8\" (1.727 m). Weight as of this encounter: 110 lb (49.9 kg). Intake/Output Summary (Last 24 hours) at 12/11/2022 1255  Last data filed at 12/11/2022 0757  Gross per 24 hour   Intake --   Output 2400 ml   Net -2400 ml           Physical Exam:     Blood pressure 137/88, pulse 69, temperature 97.3 °F (36.3 °C), temperature source Axillary, resp. rate 16, height 5' 8\" (1.727 m), weight 110 lb (49.9 kg), SpO2 96 %. General:    Thin WM in NAD  Head:  Normocephalic, atraumatic  Eyes:  Sclerae appear normal.  Pupils equally round. ENT:  Nares appear normal, no drainage. Moist oral mucosa  Neck:  No restricted ROM. Trachea midline   CV:   RRR. No m/r/g. No jugular venous distension. Lungs:   CTA b/l; no wheezing  rhonchi crackles  Abdomen:   Colostomy in place, brown fecal matters noted; +BSx4  Extremities: No cyanosis or clubbing. No edema; + mvmt x 4  Skin:     No rashes and normal coloration. Warm and dry. Neuro:  Chronic garbled speech; A&Ox3  Psych:  Normal mood and affect.       I have personally reviewed labs and tests showing:  Recent Labs:  Recent Results (from the past 48 hour(s))   Basic Metabolic Panel w/ Reflex to MG    Collection Time: 12/10/22  6:07 AM   Result Value Ref Range    Sodium 137 133 - 143 mmol/L    Potassium 4.1 3.5 - 5.1 mmol/L    Chloride 104 101 - 110 mmol/L    CO2 29 21 - 32 mmol/L    Anion Gap 4 2 - 11 mmol/L    Glucose 117 (H) 65 - 100 mg/dL    BUN 25 (H) 8 - 23 MG/DL    Creatinine 1.50 0.8 - 1.5 MG/DL    Est, Glom Filt Rate 47 (L) >60 ml/min/1.73m2    Calcium 8.6 8.3 - 10.4 MG/DL   CBC    Collection Time: 12/10/22  6:07 AM   Result Value Ref Range WBC 8.8 4.3 - 11.1 K/uL    RBC 3.70 (L) 4.23 - 5.6 M/uL    Hemoglobin 10.5 (L) 13.6 - 17.2 g/dL    Hematocrit 34.3 (L) 41.1 - 50.3 %    MCV 92.7 82 - 102 FL    MCH 28.4 26.1 - 32.9 PG    MCHC 30.6 (L) 31.4 - 35.0 g/dL    RDW 14.3 11.9 - 14.6 %    Platelets 809 620 - 716 K/uL    MPV 9.7 9.4 - 12.3 FL    nRBC 0.00 0.0 - 0.2 K/uL   Basic Metabolic Panel w/ Reflex to MG    Collection Time: 12/11/22  6:26 AM   Result Value Ref Range    Sodium 141 133 - 143 mmol/L    Potassium 4.2 3.5 - 5.1 mmol/L    Chloride 108 101 - 110 mmol/L    CO2 29 21 - 32 mmol/L    Anion Gap 4 2 - 11 mmol/L    Glucose 84 65 - 100 mg/dL    BUN 26 (H) 8 - 23 MG/DL    Creatinine 1.40 0.8 - 1.5 MG/DL    Est, Glom Filt Rate 51 (L) >60 ml/min/1.73m2    Calcium 8.5 8.3 - 10.4 MG/DL   CBC    Collection Time: 12/11/22  6:26 AM   Result Value Ref Range    WBC 5.0 4.3 - 11.1 K/uL    RBC 3.56 (L) 4.23 - 5.6 M/uL    Hemoglobin 10.2 (L) 13.6 - 17.2 g/dL    Hematocrit 33.1 (L) 41.1 - 50.3 %    MCV 93.0 82 - 102 FL    MCH 28.7 26.1 - 32.9 PG    MCHC 30.8 (L) 31.4 - 35.0 g/dL    RDW 14.4 11.9 - 14.6 %    Platelets 797 833 - 864 K/uL    MPV 9.4 9.4 - 12.3 FL    nRBC 0.00 0.0 - 0.2 K/uL       I have personally reviewed imaging studies showing: Other Studies:  XR CHEST PORTABLE   Final Result      1. No acute cardiopulmonary process. CPT code(s) 99486                  XR CHEST PORTABLE   Final Result   1. No acute abnormality in the chest.   2. No acute abdominal or pelvic abnormality. Interval decrease in the small   bowel dilation. XR ABDOMEN (KUB) (SINGLE AP VIEW)   Final Result   1. No acute abnormality in the chest.   2. No acute abdominal or pelvic abnormality. Interval decrease in the small   bowel dilation.           Current Meds:  Current Facility-Administered Medications   Medication Dose Route Frequency    hydrALAZINE (APRESOLINE) injection 10 mg  10 mg IntraVENous Q8H PRN    guaiFENesin (MUCINEX) extended release tablet 600 mg  600 mg Oral BID    ipratropium-albuterol (DUONEB) nebulizer solution 1 ampule  1 ampule Inhalation Q4H WA    polyethylene glycol (GLYCOLAX) packet 17 g  17 g Oral Daily PRN    levothyroxine (SYNTHROID) tablet 125 mcg  125 mcg Oral Daily    enoxaparin (LOVENOX) injection 40 mg  40 mg SubCUTAneous Q24H    lidocaine (XYLOCAINE) 2 % uro-jet   Topical PRN    clonazePAM (KLONOPIN) tablet 4 mg  4 mg Oral QHS PRN    OLANZapine (ZYPREXA) tablet 7.5 mg  7.5 mg Oral Nightly    amitriptyline (ELAVIL) tablet 50 mg  50 mg Oral Nightly    carvedilol (COREG) tablet 6.25 mg  6.25 mg Oral BID WC    clonazePAM (KLONOPIN) tablet 2 mg  2 mg Oral Q8H PRN    sodium chloride flush 0.9 % injection 5-40 mL  5-40 mL IntraVENous 2 times per day    sodium chloride flush 0.9 % injection 5-40 mL  5-40 mL IntraVENous PRN    0.9 % sodium chloride infusion   IntraVENous PRN    acetaminophen (TYLENOL) tablet 650 mg  650 mg Oral Q6H PRN    Or    acetaminophen (TYLENOL) suppository 650 mg  650 mg Rectal Q6H PRN       Signed:  GENIA Joya    Part of this note may have been written by using a voice dictation software. The note has been proof read but may still contain some grammatical/other typographical errors.

## 2022-12-12 ENCOUNTER — TELEPHONE (OUTPATIENT)
Dept: UROLOGY | Age: 77
End: 2022-12-12

## 2022-12-12 VITALS
HEART RATE: 79 BPM | WEIGHT: 110 LBS | SYSTOLIC BLOOD PRESSURE: 129 MMHG | DIASTOLIC BLOOD PRESSURE: 76 MMHG | RESPIRATION RATE: 19 BRPM | OXYGEN SATURATION: 96 % | HEIGHT: 68 IN | BODY MASS INDEX: 16.67 KG/M2 | TEMPERATURE: 98.1 F

## 2022-12-12 PROCEDURE — 6360000002 HC RX W HCPCS: Performed by: INTERNAL MEDICINE

## 2022-12-12 PROCEDURE — 6370000000 HC RX 637 (ALT 250 FOR IP): Performed by: PHYSICIAN ASSISTANT

## 2022-12-12 PROCEDURE — 6370000000 HC RX 637 (ALT 250 FOR IP): Performed by: INTERNAL MEDICINE

## 2022-12-12 PROCEDURE — 6370000000 HC RX 637 (ALT 250 FOR IP): Performed by: FAMILY MEDICINE

## 2022-12-12 PROCEDURE — 2580000003 HC RX 258: Performed by: FAMILY MEDICINE

## 2022-12-12 PROCEDURE — 94640 AIRWAY INHALATION TREATMENT: CPT

## 2022-12-12 PROCEDURE — 94760 N-INVAS EAR/PLS OXIMETRY 1: CPT

## 2022-12-12 PROCEDURE — 94761 N-INVAS EAR/PLS OXIMETRY MLT: CPT

## 2022-12-12 RX ORDER — ENOXAPARIN SODIUM 100 MG/ML
30 INJECTION SUBCUTANEOUS EVERY 24 HOURS
Status: DISCONTINUED | OUTPATIENT
Start: 2022-12-13 | End: 2022-12-12 | Stop reason: HOSPADM

## 2022-12-12 RX ADMIN — IPRATROPIUM BROMIDE AND ALBUTEROL SULFATE 1 AMPULE: .5; 3 SOLUTION RESPIRATORY (INHALATION) at 12:26

## 2022-12-12 RX ADMIN — SODIUM CHLORIDE, PRESERVATIVE FREE 10 ML: 5 INJECTION INTRAVENOUS at 09:10

## 2022-12-12 RX ADMIN — LEVOTHYROXINE SODIUM 125 MCG: 0.07 TABLET ORAL at 05:45

## 2022-12-12 RX ADMIN — ENOXAPARIN SODIUM 40 MG: 100 INJECTION SUBCUTANEOUS at 09:10

## 2022-12-12 RX ADMIN — GUAIFENESIN 600 MG: 600 TABLET ORAL at 09:10

## 2022-12-12 RX ADMIN — CARVEDILOL 6.25 MG: 6.25 TABLET, FILM COATED ORAL at 09:10

## 2022-12-12 RX ADMIN — IPRATROPIUM BROMIDE AND ALBUTEROL SULFATE 1 AMPULE: .5; 3 SOLUTION RESPIRATORY (INHALATION) at 08:38

## 2022-12-12 ASSESSMENT — PAIN SCALES - GENERAL: PAINLEVEL_OUTOF10: 0

## 2022-12-12 NOTE — TELEPHONE ENCOUNTER
----- Message from Kena Walsh MD sent at 12/10/2022  7:51 AM EST -----  Regarding: [address]: [address] [facility]    Surgeon: Bhavana Barrow    Assist: NONE    Diagnosis: Right Hydronephrosis    Procedure: Cystoscopy, Right Retrograde Pyelogram, Right Ureteral Stent Exchange, John catheter exchange    Posting time: 30 minutes     Special Instruments Needed:  NONE    Anesthesia: GENERAL    Labs:     Tests: EKG per anesthesia    Blood: NONE    Bowel Prep: NONE    Special Instructions: Please schedule in June 2023. Please bring in for H&P about 1 week prior to surgery date.     Orders/HandP:     Follow up appointment: TBD

## 2022-12-12 NOTE — PROGRESS NOTES
TRANSFER - OUT REPORT:    Verbal report given to Mildred Tay LPN  on [address]  being transferred to THE [facility] for routine post-op       Report consisted of patient's Situation, Background, Assessment and   Recommendations(SBAR). Information from the following report(s) Nurse Handoff Report was reviewed with the receiving nurse. [place] Assessment: No data recorded    Opportunity for questions and clarification was provided.       Patient transported with:  Marlene Brand, patient belongings

## 2022-12-12 NOTE — PROGRESS NOTES
Physician Progress Note      PATIENTRoise Burn  CSN #:                  [id number]  :                       1945  ADMIT DATE:       2022 8:32 AM  DISCH DATE:  RESPONDING  PROVIDER #:        Memo CORMIER          QUERY TEXT:    Patient admitted with BMI 16. If possible, please document in progress notes   and discharge summary if you are evaluating and /or treating any of the   following: The medical record reflects the following:  Risk Factors: Rectal cancer, age, UTI,  Clinical Indicators: Patient admitted with BMI of 16, has noted debility and   generalized weakness,  Treatment: PT/OT,    ASPEN Criteria:    https://aspenjournals. onlinelibrary. galicia. com/doi/full/10.1177/777751930262088  5  Options provided:  -- Protein calorie malnutrition mild  -- Protein calorie malnutrition moderate  -- Protein calorie malnutrition severe  -- Underweight with BMI 16  -- Other - I will add my own diagnosis  -- Disagree - Not applicable / Not valid  -- Disagree - Clinically unable to determine / Unknown  -- Refer to Clinical Documentation Reviewer    PROVIDER RESPONSE TEXT:    This patient has moderate protein calorie malnutrition. Query created by:  Samuel Suarez on 2022 9:12 AM      Electronically signed by:  Memo CORMIER 2022 11:04 AM

## 2022-12-12 NOTE — PROGRESS NOTES
Hourly rounds complete this shift, no new complaints at this time, patient requested a leg bag to take for discharge, bed in low, locked position, call light and bedside table within reach,  all needs met. Will continue to monitor Report to day shift nurse.

## 2022-12-12 NOTE — CARE COORDINATION
Chart reviewed by . Patient is medically cleared for discharge today. Patient to discharge to [facility] via [facility]. Patient's room number is 415 and report [phone]. Patient to transport via [facility] at 1:30pm. Primary Nurse aware. No additional discharge needs noted or expressed at this time. Patient confirmed family is aware of discharge. Please consult/notify  if needs arise. 12/07/22 1481   Service Assessment   Patient Orientation Alert and Oriented;Person;Place;Situation   Cognition Alert   History Provided By Patient;Medical Record   Primary Caregiver Self   Support Systems Friends/Neighbors   Patient's Healthcare Decision Maker is: Named in [address] [place] [address]   PCP Verified by CM Yes   Last Visit to PCP Within last year   Prior Functional Level Independent in ADLs/IADLs  (Independent at baseline.)   Can patient return to prior living arrangement No   Ability to make needs known: Good   Family able to assist with home care needs: No   Financial Resources Medicare   Social/Functional History   Lives With Alone   Type of Home   ([facility])   Home Layout Two level   Home Equipment   (Denies DME use.)   Active  Yes   Mode of Transportation Car   Occupation Retired   Discharge Planning   Type of Residence Other (Comment)  ([facility])   [facility]   DME Ordered? No   Potential Assistance Purchasing Medications No   Patient expects to be discharged to: [facility] [address] Discharge   Transition of Care Consult (CM Consult) Discharge Planning;SNF   Partner SNF No   Reason Why Partner SNF Not Chosen Positive previous encounter   [address] Discharge [facility] (SNF)  [facility])   [address] Provided?  No   Mode of Transport at Discharge BLS  ([address] Time of Discharge 1330   Confirm Follow Up Transport Other (see comment)  (TBD, based upon need.)   Condition of Participation: Discharge Planning   The Plan for Transition of Care is related to the following treatment goals: obtain STR at a SNF. The Patient and/or Patient Representative was provided with a Choice of Provider? Patient   The Patient and/Or Patient Representative agree with the Discharge Plan?  Yes

## 2022-12-12 NOTE — DISCHARGE SUMMARY
Hospitalist Discharge Summary   Admit Date:  2022  8:32 AM   DC Note date: 2022  Name:  Garland Walker   Age:  76 y.o. Sex:  male  :  1945   MRN:  [id number]   Room:  [facility]  PCP:  Yusra Moses DO    Presenting Complaint: Fatigue     Initial Admission Diagnosis: Generalized weakness [I15.3]  Complicated UTI (urinary tract infection) [N39.0]  Urinary tract infection associated with indwelling urethral catheter, initial encounter ([facility] [U73.501G, N39.0]     Problem List for this Hospitalization (present on admission):    Principal Problem:    Urinary tract infection associated with indwelling urethral catheter ([facility]  Active Problems:    Mild episode of recurrent major depressive disorder ([facility]    Leg weakness, bilateral    Rectal cancer (HCC)    Decreased activities of daily living (ADL)    Hypothyroidism    Hydronephrosis of right kidney    Colostomy care ([facility]    Chronic indwelling John catheter  Resolved Problems:    * No resolved hospital problems. [facility] Course:  Patient is a 76 y.o. male who presented to the ED for cc generalized weakness since this past Friday. Nothing seems to make better or worse. Denies difficulty with vision or lid lag. Pt has a PMHx significant for rectal CA with colostomy no longer on treatment, urostomies related to radiation, due for right stent exchange this month, hypothyroidism, chronic muffled / slurred speech, admission from 10/16-10/27 for lower extremity weakness s/p IVIG, multiple abdominal surgeries with recent SBO (dc'd 2022). In ER UA positive with creatinine 1.60, afebrile.   Given acute UTI with hx of complicated UTI (pseudomonas aeruginosa and e faecalis), pt admitted and started on zosyn / vanco.     Seen in consultation for persistent right hydronephrosis, s/p cystoscopy / right retrograde pyelogram / right ureteral stent exchange with Dr. Adrian Raymundo 2022.      Resp panel + parainfluenza; case mgmt reached out to STR facility and private bed available for patient 12/12/2022 for discharge. Pt's ARF resolved with IVF and complicated UTI treated upon completion of appropriate abx course (zosyn EOT 12/11/2022). PT/OT evaluated patient and deemed patient appropriate for STR due to ongoing weakness in setting of malnutrition and debility. Pt is medically stable for dc to rehab today. Per Dr. Silvina Solis will arrange for q3week carr changes in office and next stent exchange in 6 mths. Pt will also f/u with PCP one week post dc. Disposition: STR  Diet: ADULT DIET; Regular  Code Status: Full Code    Follow Ups:   Contact information for follow-up providers     Sheree Matthews DO Follow up in 1 week(s). Specialty: Internal Medicine  Contact information:  [address]  [place] [address], DO . Specialty: Internal Medicine  Contact information:  [address] Buffy Spaulding MD Follow up in 3 week(s). Specialty: Urology  Contact information:  [address]  [address] [place] [address] [phone]                   Contact information for after-discharge care     Discharge [address] [facility]) . Service: Skilled Nursing  Contact information:  [address] [facility] [address] [phone]                           Time spent in patient discharge and coordination 45 minutes. Plan was discussed with patient, RN, case mgmt. All questions answered. Patient was stable at time of discharge. Instructions given to call a physician or return if any concerns.     Current Discharge Medication List        CONTINUE these medications which have NOT CHANGED    Details   polyethylene glycol (GLYCOLAX) 17 g packet Take 17 g by mouth daily as needed for Constipation  Qty: 527 g, Refills: 1      amitriptyline (ELAVIL) 50 MG tablet Take 1 tablet by mouth nightly  Qty: 30 tablet, Refills: 1      carvedilol (COREG) 6.25 MG tablet Take 1 tablet by mouth 2 times daily (with meals)  Qty: 180 tablet, Refills: 3      levothyroxine (SYNTHROID) 125 MCG tablet TAKE ONE TABLET BY MOUTH ONE TIME DAILY BEFORE BREAKFAST      OLANZapine (ZYPREXA) 2.5 MG tablet Take 7.5 mg by mouth nightly             Procedures done this admission:  Procedure(s):  CYSTOSCOPY, RIGHT RETROGRADE PYELOGRAM, RIGHT URETERAL STENT EXCHANGE    Consults this admission:  IP CONSULT TO UROLOGY  IP CONSULT TO PHARMACY    Echocardiogram results:  11/14/22    TRANSTHORACIC ECHOCARDIOGRAM (TTE) COMPLETE (CONTRAST/BUBBLE/3D PRN) 11/16/2022  3:31 PM, 11/16/2022 12:00 AM (Final)    Interpretation Summary    Left Ventricle: Normal left ventricular systolic function with a visually estimated EF of 60 - 65%. Left ventricle size is normal. Normal wall thickness. Normal wall motion. Normal diastolic function. Tricuspid Valve: Trace regurgitation. Normal RVSP. The estimated RVSP is 27 mmHg. Left Atrium: Left atrium is mildly dilated. Pericardium: No pericardial effusion. Sinus rhythm between 70 to 75 bpm noted during the study. Signed by: Joanne Costello MD on 11/16/2022  3:31 PM, Signed by: Unknown Provider Result on 11/16/2022 12:00 AM      Diagnostic Imaging/Tests:   XR ABDOMEN (KUB) (SINGLE AP VIEW)    Result Date: 12/5/2022  1. No acute abnormality in the chest. 2. No acute abdominal or pelvic abnormality. Interval decrease in the small bowel dilation. XR ABDOMEN (KUB) (SINGLE AP VIEW)    Result Date: 11/15/2022  1. Persistent dilated small bowel loops within the right hemiabdomen compatible with persistent bowel obstruction. XR ABDOMEN (KUB) (SINGLE AP VIEW)    Result Date: 11/14/2022  The bowel gas pattern is normal. There is no evidence of obstruction. There is no free air visualized on this supine view. The lung bases are clear. Residual oral contrast noted in the stomach. Nasogastric tube terminates in the mid stomach below the left hemidiaphragm. Right ureteral stent in place. XR ABDOMEN (KUB) (SINGLE AP VIEW)    Result Date: 11/14/2022  There are dilated loops of bowel noted in the right abdomen. It is difficult to differentiate between large and small bowel. Bowel obstruction is not excluded. Stool is seen in the left upper quadrant. CT ABDOMEN PELVIS W IV CONTRAST Additional Contrast? None    Result Date: 11/14/2022  1. Findings compatible with small bowel obstruction. Multiple dilated loops of small bowel seen with a \"small bowel feces sign\" within the central abdomen. There are some decompressed loops of bowel within the left midabdomen, though a distinct transition point is not identified. 2. Findings are otherwise unchanged compared with the reference exam.     XR CHEST PORTABLE    Result Date: 12/7/2022  1. No acute cardiopulmonary process. CPT code(s) 98805     XR CHEST PORTABLE    Result Date: 12/5/2022  1. No acute abnormality in the chest. 2. No acute abdominal or pelvic abnormality. Interval decrease in the small bowel dilation. XR CHEST 1 VIEW    Result Date: 11/15/2022  1. Elevation of left hemidiaphragm with associated basilar subsegmental atelectasis and/or infiltrates. Labs: Results:       BMP, Mg, Phos Recent Labs     12/10/22  0607 12/11/22  0626    141   K 4.1 4.2    108   CO2 29 29   ANIONGAP 4 4   BUN 25* 26*   CREATININE 1.50 1.40   LABGLOM 47* 51*   CALCIUM 8.6 8.5   GLUCOSE 117* 84      CBC Recent Labs     12/10/22  0607 12/11/22  0626   WBC 8.8 5.0   RBC 3.70* 3.56*   HGB 10.5* 10.2*   HCT 34.3* 33.1*   MCV 92.7 93.0   MCH 28.4 28.7   MCHC 30.6* 30.8*   RDW 14.3 14.4    253   MPV 9.7 9.4   NRBC 0.00 0.00      LFT No results for input(s): BILITOT, BILIDIR, ALKPHOS, AST, ALT, PROT, LABALBU, GLOB in the last 72 hours.    Cardiac  Lab Results   Component Value Date/Time    TROPHS 42.1 12/05/2022 11:57 AM    TROPHS 47.0 12/05/2022 09:35 AM    TROPHS 275.4 11/16/2022 08:12 AM      Coags Lab Results   Component Value Date/Time    PROTIME 13.7 09/15/2021 07:45 PM    PROTIME 16.3 08/27/2021 04:30 PM    PROTIME 15.5 08/27/2021 01:30 PM    INR 1.0 09/15/2021 07:45 PM    INR 1.3 08/27/2021 04:30 PM    INR 1.2 08/27/2021 01:30 PM    APTT 28.1 08/27/2021 04:30 PM    APTT 32.5 08/27/2021 01:30 PM    APTT 62.9 08/27/2021 11:56 AM      A1c Lab Results   Component Value Date/Time    LABA1C 5.2 08/03/2021 11:14 AM     08/03/2021 11:14 AM      Lipids Lab Results   Component Value Date/Time    CHOL 205 12/03/2021 08:09 AM    LDLCALC 110 12/03/2021 08:09 AM    LABVLDL 18 12/05/2019 08:30 AM    HDL 77 12/03/2021 08:09 AM    TRIG 103 12/03/2021 08:09 AM      Thyroid  Lab Results   Component Value Date/Time    TSHELE 0.72 10/16/2022 08:16 AM        Most Recent UA Lab Results   Component Value Date/Time    COLORU YELLOW/STRAW 12/05/2022 10:21 AM    APPEARANCE TURBID 12/05/2022 10:21 AM    SPECGRAV 1.007 12/05/2022 10:21 AM    LABPH 6.0 12/05/2022 10:21 AM    PROTEINU 100 12/05/2022 10:21 AM    GLUCOSEU Negative 12/05/2022 10:21 AM    KETUA TRACE 12/05/2022 10:21 AM    BILIRUBINUR Negative 12/05/2022 10:21 AM    BILIRUBINUR Negative 05/10/2022 12:32 PM    BLOODU MODERATE 12/05/2022 10:21 AM    UROBILINOGEN 0.2 12/05/2022 10:21 AM    NITRU Positive 12/05/2022 10:21 AM    LEUKOCYTESUR LARGE 12/05/2022 10:21 AM    WBCUA >100 12/05/2022 10:21 AM    RBCUA 0 12/05/2022 10:21 AM    EPITHUA 0 12/05/2022 10:21 AM    BACTERIA 4+ 12/05/2022 10:21 AM    LABCAST 3-5 12/05/2022 10:21 AM    MUCUS 0 12/05/2022 10:21 AM        Recent Labs     12/06/22  0717 12/05/22  1021 11/14/22  0930 11/14/22  0927 11/14/22  0615   CULTURE NO GROWTH 5 DAYS NO GROWTH 6 DAYS  >100,000 COLONIES/mL PSEUDOMONAS AERUGINOSA*  50,000-100,000 COLONIES/mL ESCHERICHIA COLI* NO GROWTH 5 DAYS NO GROWTH 5 DAYS >100,000 COLONIES/mL ENTEROCOCCUS FAECALIS GROUP D*  50,000-100,000 COLONIES/mL CANDIDA PARAPSILOSIS COMPLEX*       All Labs from Last 24 Hrs:  No results found for this or any previous visit (from the past 24 hour(s)). No Known Allergies  Immunization History   Administered Date(s) Administered    COVID-19, PFIZER PURPLE top, DILUTE for use, (age 15 y+), 30mcg/0.3mL 02/15/2021, 03/08/2021, 10/25/2021    PPD Test 01/28/2014, 08/24/2014, 09/09/2014, 11/19/2015, 05/23/2016, 03/25/2019, 04/23/2019, 09/24/2021, 09/27/2021, 02/24/2022, 06/27/2022, 10/17/2022, 12/07/2022       Recent Vital Data:  Patient Vitals for the past 24 hrs:   Temp Pulse Resp BP SpO2   12/12/22 0838 -- 90 17 -- 98 %   12/12/22 0800 97.8 °F (36.6 °C) 80 20 133/85 99 %   12/12/22 0338 99.3 °F (37.4 °C) 79 20 119/68 95 %   12/11/22 2237 98.6 °F (37 °C) 71 17 130/76 95 %   12/11/22 2002 -- 71 18 -- 92 %   12/11/22 1917 97.3 °F (36.3 °C) 74 20 (!) 107/55 (!) 85 %   12/11/22 1548 98.4 °F (36.9 °C) 71 16 117/65 98 %   12/11/22 1518 -- 71 15 -- 97 %   12/11/22 1146 -- 69 16 -- 96 %   12/11/22 1108 97.3 °F (36.3 °C) 77 17 137/88 98 %       Oxygen Therapy  SpO2: 98 %  Pulse Oximetry Type: Intermittent  Pulse via Oximetry: 61 beats per minute  Pulse Oximeter Device Mode: Continuous  Pulse Oximeter Device Location: Finger  O2 Device: None (Room air)  Skin Assessment: Clean, dry, & intact  Skin Protection for O2 Device: N/A  O2 Flow Rate (L/min): 2 L/min  Blood Gas  Performed?: No  Oxygen Therapy: None (Room air)    Estimated body mass index is 16.73 kg/m² as calculated from the following:    Height as of this encounter: 5' 8\" (1.727 m). Weight as of this encounter: 110 lb (49.9 kg). Intake/Output Summary (Last 24 hours) at 12/12/2022 1059  Last data filed at 12/12/2022 0912  Gross per 24 hour   Intake 240 ml   Output 1250 ml   Net -1010 ml         Physical Exam:    General:          Thin WM in NAD  Head:               Normocephalic, atraumatic  Eyes:               Sclerae appear normal.  Pupils equally round.   ENT: Nares appear normal, no drainage. Moist oral mucosa  Neck:               No restricted ROM. Trachea midline   CV:                  RRR. No m/r/g. No jugular venous distension. Lungs:             CTA b/l; no wheezing  rhonchi crackles  Abdomen:        Colostomy in place, brown fecal matters noted; +BSx4  Extremities:     No cyanosis or clubbing. No edema; + mvmt x 4  Skin:                No rashes and normal coloration. Warm and dry. Neuro:             Chronic garbled speech; A&Ox3  Psych:             Normal mood and affect. Signed:  Karie Joya    Part of this note may have been written by using a voice dictation software. The note has been proof read but may still contain some grammatical/other typographical errors.

## 2022-12-13 ENCOUNTER — CARE COORDINATION (OUTPATIENT)
Dept: OTHER | Facility: CLINIC | Age: 77
End: 2022-12-13

## 2022-12-13 NOTE — CARE COORDINATION
Transition of care outreach postponed due to patient's discharge to SNF. Patient is discharge to [facility] for STR. CTN to follow up after SNF discharge for ANTHONY MCLAUGHLIN call.

## 2022-12-22 ENCOUNTER — CARE COORDINATION (OUTPATIENT)
Dept: OTHER | Facility: CLINIC | Age: 77
End: 2022-12-22

## 2022-12-22 ENCOUNTER — NURSE ONLY (OUTPATIENT)
Dept: UROLOGY | Age: 77
End: 2022-12-22

## 2022-12-22 DIAGNOSIS — R33.9 URINARY RETENTION: ICD-10-CM

## 2022-12-22 DIAGNOSIS — N32.89 BLADDER SPASM: ICD-10-CM

## 2022-12-22 DIAGNOSIS — N13.30 HYDRONEPHROSIS, UNSPECIFIED HYDRONEPHROSIS TYPE: Primary | ICD-10-CM

## 2022-12-22 NOTE — PROGRESS NOTES
[facility]  [address]   [address]  [facility], [address] [facility]  [phone]    Teresita Nolasco  : 1945    No chief complaint on file. LEE Delgado is a 76 y.o. male with a PMH ofbladder injury and R hydronephrosis who returns for follow up. He has a PMH of kidney stones, pelvic abscess and urinary retention who returns for follow up today. He also has had multiple intra-abdominal surgeries, including drainage of intra-abdominal abscess, lysis of adhesions, diverting colostomy, complete proctectomy, removal of 10 cm small bowel and removal of infected mesh. Seen in hospital in 2021 after having surgery for completion proctectomy by Dr. Amos Gray. A cystotomy occurred during surgery and was repaired intra-operatively by Dr. Amos Gray. Unfortunately, this has not healed and he now has bilateral nephrostomy tubes and indwelling catheter in place for urinary diversion since 2021. The cystotomy appears to be walled off and communicates within the pelvis but no intraperitoneally. Comes in every 3 weeks 20 Fr coudet cath changes. He had bilateral NTs in to help divert the urine initially. L NT was removed eventually as he developed a contained entero-vesical fistula that does okay as long as it is managed with carr from below. R NT converted to R stent in 2021, however, due to persistent R hydronephrosis on IR antegrade pyelogram.  He had stent change 2022 and is due for stent exchange 2022 on the R.       R stent exchange 22. Was having leakage around Carr cath. His nurse at rehab change this today. He cont to have leakage. Here for evaluation.            Past Medical History:   Diagnosis Date    Acute deep vein thrombosis (DVT) of non-extremity vein 2019    Cancer (HCC)     Family history of malignant neoplasm of gastrointestinal tract     mother colon/ovarian cancer 67    Fecal incontinence     LAR syndrome    Carr catheter in place 2022    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    surgery and radiation    Psychiatric disorder     anxiety     Past Surgical History:   Procedure Laterality Date    COLONOSCOPY  last 2/3/15    Roberto--no polyps--3 year recall    COLONOSCOPY N/A 3/5/2021    COLONOSCOPY/BMI 19 performed by Claudetta Mocha, MD at [facility] N/A 2/12/2018    COLONOSCOPY / BMI=21 performed by Teresa Wynn MD at [place] [address]  2/12/2018         COLONOSCOPY THRU STOMA,LESN RMVL W/SNARE  3/5/2021         COLOSTOMY  5/11/16    CT RETROPERITONEAL PERC DRAIN  5/13/2021    CT RETROPERITONEAL PERC DRAIN 5/13/2021 [facility] CT SCAN    CT RETROPERITONEAL PERC DRAIN  5/20/2021    CT RETROPERITONEAL PERC DRAIN 5/20/2021 [facility] CT SCAN    CYSTOSCOPY Right 12/9/2022    CYSTOSCOPY, RIGHT RETROGRADE PYELOGRAM, RIGHT URETERAL STENT EXCHANGE performed by Irais Andrade MD at [address] [place] [address]  4/2016    Sacral nerve stimlator lead trial (unsucessful) and removal    HERNIA REPAIR      and Colostomy in May    HERNIA REPAIR  3/2015, 5/2016    IR ABSCESS EVAL THRU EXISTING CATH  11/24/2021    IR ABSCESS EVAL THRU EXISTING CATH  6/4/2021    IR ABSCESS EVAL THRU EXISTING CATH  5/20/2021    IR NEPHROSTOGRAM EXISTING ACCESS  12/10/2021    IR NEPHROSTOGRAM EXISTING ACCESS  10/4/2021    IR NEPHROSTOMY EXCHANGE CATHETER  12/7/2021    IR NEPHROSTOMY EXCHANGE CATHETER  11/3/2021    IR NEPHROSTOMY EXCHANGE CATHETER  10/28/2021    IR NEPHROSTOMY PERCUTANEOUS LEFT  2021    IR NEPHROSTOMY PERCUTANEOUS LEFT  2021    IR NEPHROSTOMY PERCUTANEOUS LEFT 2021 [facility] SPECI[facility]    IR REPLCE T CVC WO PORT SAME ACC  2019    IR TUBE CHANGE  2021    IR TUBE CHANGE  2021    IR TUBE CHANGE  2021    IR TUBE CHANGE  2021    IR TUNNELED CATHETER PLACEMENT GREATER THAN 5 YEARS  2019    IR TUNNELED CATHETER PLACEMENT GREATER THAN 5 YEARS  2019    IR TUNNELED CATHETER PLACEMENT GREATER THAN 5 YEARS 2019 [facility] SPECIALS    OTHER SURGICAL HISTORY Bilateral     cataracts      OTHER SURGICAL HISTORY  10/7/2013    Roberto---endorectal us    POLYPECTOMY      TOTAL COLECTOMY  2013    Roberto--lap LAR with coloproctostomy, mobilization splenic flexure, diverting loop ileostomy    TOTAL COLECTOMY Right 2014    for leak    VASCULAR SURGERY Left     single lumen port/subsequently removed     Current Outpatient Medications   Medication Sig Dispense Refill    amitriptyline (ELAVIL) 50 MG tablet Take 1 tablet by mouth nightly 30 tablet 1    carvedilol (COREG) 6.25 MG tablet Take 1 tablet by mouth 2 times daily (with meals) 180 tablet 3    levothyroxine (SYNTHROID) 125 MCG tablet TAKE ONE TABLET BY MOUTH ONE TIME DAILY BEFORE BREAKFAST      OLANZapine (ZYPREXA) 2.5 MG tablet Take 7.5 mg by mouth nightly       No current facility-administered medications for this visit.      No Known Allergies  Social History     Socioeconomic History    Marital status: Single     Spouse name: Not on file    Number of children: Not on file    Years of education: Not on file    Highest education level: Not on file   Occupational History    Not on file   Tobacco Use    Smoking status: Former     Packs/day: 1.00     Types: Cigarettes     Quit date: 1963     Years since quittin.1    Smokeless tobacco: Never   Substance and Sexual Activity    Alcohol use: Not Currently     Alcohol/week: 11.7 standard drinks    Drug use: No    Sexual activity: Not on file   Other Topics Concern    Not on file   Social History Narrative    Not on file     Social Determinants of Health     Financial Resource Strain: Not on file   Food Insecurity: Not on file   Transportation Needs: Not on file   Physical Activity: Not on file   Stress: Not on file   Social Connections: Not on file   Intimate Partner Violence: Not on file   Housing Stability: Not on file     Family History   Problem Relation Age of Onset    Cancer Mother         colon and ovarian    Cancer Brother         prostate    Alcohol Abuse Brother     Cancer Maternal Grandmother         bone    Alcohol Abuse Father     Stroke Paternal Grandfather     Alcohol Abuse Sister        ROS    PHYSICAL EXAM    General appearance - well appearing and in no distress  Mental status - alert, oriented to person, place, and time  Neck - supple, no significant adenopathy  Chest/Lung-  Quiet, even and easy respiratory effort without use of accessory muscles  Skin - normal coloration and turgor, no rashes    KUB in office today reviewed by myself and shows R ureteral stent in good placement       Assessment and Plan    ICD-10-CM    1. Hydronephrosis, unspecified hydronephrosis type  N13.30 AMB POC XRAY ABDOMEN 1 VIEW      2. Urinary retention  R33.9       3. Bladder spasm  N32.89         R hydronephrosis- R stent in good placement. No add tx needed at this time. Urinary retention- carr cath in place. Balloon inflated. Irrigated w sterile water. Irrigated well. Patient tolerated. Bladder spasms- sx consistent w bladder spasm. Take levsin 0.125 mg PO q 4-6 hr PRN spasms. Discussed adding oxybutynin 5 mg PO TID PRN. He opts to HOLD at this time. Has follow up. Will keep this. Advised to call sooner if needed. Bill Stoll, ROSEP, APRN - CNP  Dr. Jo Ann Shearer is supervising physician today and he approves plan of care.

## 2022-12-22 NOTE — CARE COORDINATION
CTN attempted outreach to patient for [facility] outreach. Unable to reach patient and unable to leave a message at this time. Will attempt outreach again.

## 2022-12-23 ENCOUNTER — CARE COORDINATION (OUTPATIENT)
Dept: OTHER | Facility: CLINIC | Age: 77
End: 2022-12-23

## 2022-12-23 NOTE — CARE COORDINATION
[address] [facility] Transitions Initial SNF Follow Up Call    Call within 2 business days of discharge: Yes  Multiple unsuccessful attempts to reach patient for follow up call. Unable to reach patient. Will resolve episode at this time. Patient: Jn Nolasco Patient : 1945   MRN: [id number]  Reason for Admission: Generalized weakness  Discharge Date: 22 RARS: Readmission Risk Score: 27    Last Discharge  [facility]       Date Complaint Diagnosis Description Type Department Provider    22 Fatigue Generalized weakness . .. ED to Hosp-Admission (Discharged) (ADMITTED) [id number] Cristine Lawton MD; Sushma Ovalles. .. Was this an external facility discharge?  Yes, 22  Discharge Facility: [facility]    Follow Up  Future Appointments   Date Time Provider Dot Gusman   2023  2:15 PM Eloina Dc DO TRIM [facility] AMB   2023 10:00 AM [id number] INJECTION/CATH [id number] [facility] AMB   José Miguel Tran RN

## 2023-01-04 NOTE — PROGRESS NOTES
Physician Progress Note      Dipti Gerber  [facility] #:                  [id number]  :                       1945  ADMIT DATE:       2022 8:32 AM  DISCH DATE:        2022 1:45 PM  RESPONDING  PROVIDER #:        Asya Lawson MD          QUERY TEXT:    Patient admitted with complicated UTI. Noted documentation of Acute Kidney   Injury in progress note on . In order to support the diagnosis of KEVAN,   please include additional clinical indicators in your documentation. ? Or   please document if the diagnosis of KEVAN has been ruled out after further   study. The medical record reflects the following:  Risk Factors: Extensive urologic surgery hx, dehydrated, UTI  Clinical Indicators: Cr on admission 1.60, noted that KEVAN resolved with Cr   1.40 on  progress note. Treatment: John exchange, IV fluids, UTI treatment,    Defined by Kidney Disease Improving Global Outcomes (KDIGO) clinical practice   guideline for acute kidney injury:  -Increase in SCr by greater than or equal to 0.3 mg/dl within 48 hours; or  -Increase or decrease in SCr to greater than or equal to 1.5 times baseline,   which is known or presumed to have occurred within the prior 7 days; or  -Urine volume < 0.5ml/kg/h for 6 hours. Options provided:  -- Acute kidney injury ruled out after study  -- Acute kidney injury evidenced by, Please document evidence as well as a   numerical baseline creatinine, if known. -- Other - I will add my own diagnosis  -- Disagree - Not applicable / Not valid  -- Disagree - Clinically unable to determine / Unknown  -- Refer to Clinical Documentation Reviewer    PROVIDER RESPONSE TEXT:    Acute kidney injury was ruled out after study. Query created by: Joel Iyer on 2022 12:34 PM      QUERY TEXT:    Patient admitted with complicated UTI. Noted documentation of depression in   H&P on .  Please document in progress notes and discharge summary if you   are treating/evaluating the following: The medical record reflects the following:  Risk Factors: Hx of depression, chronic debility  Clinical Indicators: Noted on H&P Zyprexa held for prolonged QTC. Noted   depression well controlled on progress notes  Treatment: Zyprexa, Elavil, Klonopin  Options provided:  -- Major depression, recurrent: mild  -- Depression unspecified  -- Other - I will add my own diagnosis  -- Disagree - Not applicable / Not valid  -- Disagree - Clinically unable to determine / Unknown  -- Refer to Clinical Documentation Reviewer    PROVIDER RESPONSE TEXT:    This patient has unspecified depression. Query created by:  Inga Thomas on 12/21/2022 12:59 PM      Electronically signed by:  Rogelio Vizcarra MD 1/4/2023 2:03 PM

## 2023-01-13 ENCOUNTER — NURSE ONLY (OUTPATIENT)
Dept: UROLOGY | Age: 78
End: 2023-01-13

## 2023-01-13 DIAGNOSIS — R33.9 URINARY RETENTION: Primary | ICD-10-CM

## 2023-01-13 NOTE — PROGRESS NOTES
Carr catheter removed and replaced with new carr catheter using sterile technique without any complications. Patient tolerated the procedure well. He will RTO in 3 weeks for next catheter change.

## 2023-01-20 ENCOUNTER — NURSE ONLY (OUTPATIENT)
Dept: INTERNAL MEDICINE CLINIC | Facility: CLINIC | Age: 78
End: 2023-01-20

## 2023-01-20 DIAGNOSIS — E87.6 HYPOKALEMIA: ICD-10-CM

## 2023-01-20 DIAGNOSIS — R09.02 HYPOXEMIA: ICD-10-CM

## 2023-01-20 DIAGNOSIS — E87.0 HYPERNATREMIA: ICD-10-CM

## 2023-01-20 DIAGNOSIS — E03.9 HYPOTHYROIDISM, UNSPECIFIED TYPE: ICD-10-CM

## 2023-01-20 LAB
BASOPHILS # BLD: 0 K/UL (ref 0–0.2)
BASOPHILS NFR BLD: 0 % (ref 0–2)
DIFFERENTIAL METHOD BLD: ABNORMAL
EOSINOPHIL # BLD: 0 K/UL (ref 0–0.8)
EOSINOPHIL NFR BLD: 0 % (ref 0.5–7.8)
ERYTHROCYTE [DISTWIDTH] IN BLOOD BY AUTOMATED COUNT: 14.3 % (ref 11.9–14.6)
HCT VFR BLD AUTO: 44.3 % (ref 41.1–50.3)
HGB BLD-MCNC: 13.5 G/DL (ref 13.6–17.2)
IMM GRANULOCYTES # BLD AUTO: 0 K/UL (ref 0–0.5)
IMM GRANULOCYTES NFR BLD AUTO: 0 % (ref 0–5)
LYMPHOCYTES # BLD: 0.3 K/UL (ref 0.5–4.6)
LYMPHOCYTES NFR BLD: 5 % (ref 13–44)
MCH RBC QN AUTO: 29 PG (ref 26.1–32.9)
MCHC RBC AUTO-ENTMCNC: 30.5 G/DL (ref 31.4–35)
MCV RBC AUTO: 95.1 FL (ref 82–102)
MONOCYTES # BLD: 0.4 K/UL (ref 0.1–1.3)
MONOCYTES NFR BLD: 6 % (ref 4–12)
NEUTS SEG # BLD: 6 K/UL (ref 1.7–8.2)
NEUTS SEG NFR BLD: 89 % (ref 43–78)
NRBC # BLD: 0 K/UL (ref 0–0.2)
PLATELET # BLD AUTO: 215 K/UL (ref 150–450)
PMV BLD AUTO: 10.7 FL (ref 9.4–12.3)
RBC # BLD AUTO: 4.66 M/UL (ref 4.23–5.6)
WBC # BLD AUTO: 6.7 K/UL (ref 4.3–11.1)

## 2023-01-21 LAB
25(OH)D3 SERPL-MCNC: 25.2 NG/ML (ref 30–100)
ALBUMIN SERPL-MCNC: 3.9 G/DL (ref 3.2–4.6)
ALBUMIN/GLOB SERPL: 1 (ref 0.4–1.6)
ALP SERPL-CCNC: 89 U/L (ref 50–136)
ALT SERPL-CCNC: 25 U/L (ref 12–65)
ANION GAP SERPL CALC-SCNC: 11 MMOL/L (ref 2–11)
AST SERPL-CCNC: 24 U/L (ref 15–37)
BILIRUB SERPL-MCNC: 0.6 MG/DL (ref 0.2–1.1)
BUN SERPL-MCNC: 18 MG/DL (ref 8–23)
CALCIUM SERPL-MCNC: 10 MG/DL (ref 8.3–10.4)
CHLORIDE SERPL-SCNC: 101 MMOL/L (ref 101–110)
CHOLEST SERPL-MCNC: 179 MG/DL
CO2 SERPL-SCNC: 23 MMOL/L (ref 21–32)
CREAT SERPL-MCNC: 1.5 MG/DL (ref 0.8–1.5)
FERRITIN SERPL-MCNC: 60 NG/ML (ref 8–388)
GLOBULIN SER CALC-MCNC: 3.9 G/DL (ref 2.8–4.5)
GLUCOSE SERPL-MCNC: 129 MG/DL (ref 65–100)
HDLC SERPL-MCNC: 78 MG/DL (ref 40–60)
HDLC SERPL: 2.3
IRON SERPL-MCNC: 47 UG/DL (ref 35–150)
LDLC SERPL CALC-MCNC: 79.4 MG/DL
POTASSIUM SERPL-SCNC: 4.4 MMOL/L (ref 3.5–5.1)
PROT SERPL-MCNC: 7.8 G/DL (ref 6.3–8.2)
SODIUM SERPL-SCNC: 135 MMOL/L (ref 133–143)
TRIGL SERPL-MCNC: 108 MG/DL (ref 35–150)
TSH W FREE THYROID IF ABNORMAL: 3.74 UIU/ML (ref 0.36–3.74)
VLDLC SERPL CALC-MCNC: 21.6 MG/DL (ref 6–23)

## 2023-01-25 ENCOUNTER — OFFICE VISIT (OUTPATIENT)
Dept: INTERNAL MEDICINE CLINIC | Facility: CLINIC | Age: 78
End: 2023-01-25
Payer: MEDICARE

## 2023-01-25 VITALS
SYSTOLIC BLOOD PRESSURE: 122 MMHG | WEIGHT: 115 LBS | BODY MASS INDEX: 17.43 KG/M2 | TEMPERATURE: 97.7 F | HEIGHT: 68 IN | DIASTOLIC BLOOD PRESSURE: 70 MMHG

## 2023-01-25 DIAGNOSIS — Z00.00 MEDICARE ANNUAL WELLNESS VISIT, SUBSEQUENT: ICD-10-CM

## 2023-01-25 DIAGNOSIS — C20 RECTAL CANCER (HCC): ICD-10-CM

## 2023-01-25 DIAGNOSIS — F32.A DEPRESSION, UNSPECIFIED DEPRESSION TYPE: ICD-10-CM

## 2023-01-25 DIAGNOSIS — E43 SEVERE PROTEIN-CALORIE MALNUTRITION (HCC): ICD-10-CM

## 2023-01-25 DIAGNOSIS — Z43.3 COLOSTOMY CARE (HCC): ICD-10-CM

## 2023-01-25 DIAGNOSIS — K51.40 PSEUDOPOLYPOSIS OF COLON WITHOUT COMPLICATION, UNSPECIFIED PART OF COLON (HCC): ICD-10-CM

## 2023-01-25 DIAGNOSIS — F33.0 MILD EPISODE OF RECURRENT MAJOR DEPRESSIVE DISORDER (HCC): ICD-10-CM

## 2023-01-25 DIAGNOSIS — F41.9 ANXIETY: ICD-10-CM

## 2023-01-25 DIAGNOSIS — E03.9 HYPOTHYROIDISM, UNSPECIFIED TYPE: Primary | ICD-10-CM

## 2023-01-25 DIAGNOSIS — I10 ESSENTIAL (PRIMARY) HYPERTENSION: ICD-10-CM

## 2023-01-25 DIAGNOSIS — R63.0 APPETITE LOSS: ICD-10-CM

## 2023-01-25 PROCEDURE — 1123F ACP DISCUSS/DSCN MKR DOCD: CPT | Performed by: INTERNAL MEDICINE

## 2023-01-25 PROCEDURE — G8419 CALC BMI OUT NRM PARAM NOF/U: HCPCS | Performed by: INTERNAL MEDICINE

## 2023-01-25 PROCEDURE — G0439 PPPS, SUBSEQ VISIT: HCPCS | Performed by: INTERNAL MEDICINE

## 2023-01-25 PROCEDURE — G8484 FLU IMMUNIZE NO ADMIN: HCPCS | Performed by: INTERNAL MEDICINE

## 2023-01-25 PROCEDURE — G8427 DOCREV CUR MEDS BY ELIG CLIN: HCPCS | Performed by: INTERNAL MEDICINE

## 2023-01-25 PROCEDURE — 3078F DIAST BP <80 MM HG: CPT | Performed by: INTERNAL MEDICINE

## 2023-01-25 PROCEDURE — 3074F SYST BP LT 130 MM HG: CPT | Performed by: INTERNAL MEDICINE

## 2023-01-25 PROCEDURE — 1036F TOBACCO NON-USER: CPT | Performed by: INTERNAL MEDICINE

## 2023-01-25 PROCEDURE — 99214 OFFICE O/P EST MOD 30 MIN: CPT | Performed by: INTERNAL MEDICINE

## 2023-01-25 RX ORDER — LEVOTHYROXINE SODIUM 0.12 MG/1
TABLET ORAL
Qty: 30 TABLET | Refills: 11 | Status: SHIPPED | OUTPATIENT
Start: 2023-01-25

## 2023-01-25 RX ORDER — MIRTAZAPINE 7.5 MG/1
7.5 TABLET, FILM COATED ORAL NIGHTLY
Qty: 90 TABLET | Refills: 1 | Status: SHIPPED | OUTPATIENT
Start: 2023-01-25

## 2023-01-25 RX ORDER — CLONAZEPAM 2 MG/1
TABLET ORAL NIGHTLY
COMMUNITY
Start: 2023-01-18

## 2023-01-25 ASSESSMENT — ENCOUNTER SYMPTOMS
NAUSEA: 0
DIARRHEA: 0
ABDOMINAL PAIN: 0
WHEEZING: 0
SHORTNESS OF BREATH: 0
SINUS PAIN: 0
VOMITING: 0
CONSTIPATION: 0

## 2023-01-25 ASSESSMENT — PATIENT HEALTH QUESTIONNAIRE - PHQ9
4. FEELING TIRED OR HAVING LITTLE ENERGY: 0
3. TROUBLE FALLING OR STAYING ASLEEP: 0
9. THOUGHTS THAT YOU WOULD BE BETTER OFF DEAD, OR OF HURTING YOURSELF: 3
5. POOR APPETITE OR OVEREATING: 3
2. FEELING DOWN, DEPRESSED OR HOPELESS: 3
SUM OF ALL RESPONSES TO PHQ QUESTIONS 1-9: 12
10. IF YOU CHECKED OFF ANY PROBLEMS, HOW DIFFICULT HAVE THESE PROBLEMS MADE IT FOR YOU TO DO YOUR WORK, TAKE CARE OF THINGS AT HOME, OR GET ALONG WITH OTHER PEOPLE: 0
1. LITTLE INTEREST OR PLEASURE IN DOING THINGS: 3
SUM OF ALL RESPONSES TO PHQ QUESTIONS 1-9: 12
SUM OF ALL RESPONSES TO PHQ9 QUESTIONS 1 & 2: 6
SUM OF ALL RESPONSES TO PHQ QUESTIONS 1-9: 9
7. TROUBLE CONCENTRATING ON THINGS, SUCH AS READING THE NEWSPAPER OR WATCHING TELEVISION: 0
6. FEELING BAD ABOUT YOURSELF - OR THAT YOU ARE A FAILURE OR HAVE LET YOURSELF OR YOUR FAMILY DOWN: 0
SUM OF ALL RESPONSES TO PHQ QUESTIONS 1-9: 12
8. MOVING OR SPEAKING SO SLOWLY THAT OTHER PEOPLE COULD HAVE NOTICED. OR THE OPPOSITE, BEING SO FIGETY OR RESTLESS THAT YOU HAVE BEEN MOVING AROUND A LOT MORE THAN USUAL: 0

## 2023-01-25 ASSESSMENT — LIFESTYLE VARIABLES
HOW MANY STANDARD DRINKS CONTAINING ALCOHOL DO YOU HAVE ON A TYPICAL DAY: PATIENT DOES NOT DRINK
HOW OFTEN DO YOU HAVE A DRINK CONTAINING ALCOHOL: NEVER

## 2023-01-25 ASSESSMENT — COLUMBIA-SUICIDE SEVERITY RATING SCALE - C-SSRS
5. HAVE YOU STARTED TO WORK OUT OR WORKED OUT THE DETAILS OF HOW TO KILL YOURSELF? DO YOU INTEND TO CARRY OUT THIS PLAN?: YES
6. HAVE YOU EVER DONE ANYTHING, STARTED TO DO ANYTHING, OR PREPARED TO DO ANYTHING TO END YOUR LIFE?: NO
1. WITHIN THE PAST MONTH, HAVE YOU WISHED YOU WERE DEAD OR WISHED YOU COULD GO TO SLEEP AND NOT WAKE UP?: YES
2. HAVE YOU ACTUALLY HAD ANY THOUGHTS OF KILLING YOURSELF?: YES
3. HAVE YOU BEEN THINKING ABOUT HOW YOU MIGHT KILL YOURSELF?: YES
4. HAVE YOU HAD THESE THOUGHTS AND HAD SOME INTENTION OF ACTING ON THEM?: NO

## 2023-01-25 NOTE — PROGRESS NOTES
Thomas Coleman was seen today for medicare awv. Diagnoses and all orders for this visit:    Hypothyroidism, unspecified type  -     levothyroxine (SYNTHROID) 125 MCG tablet; TAKE ONE TABLET BY MOUTH ONE TIME DAILY BEFORE BREAKFAST    Essential (primary) hypertension    Appetite loss  -     mirtazapine (REMERON) 7.5 MG tablet; Take 1 tablet by mouth nightly    Depression, unspecified depression type  -     mirtazapine (REMERON) 7.5 MG tablet;  Take 1 tablet by mouth nightly    Colostomy care ([facility]    Severe protein-calorie malnutrition (HCC)    Mild episode of recurrent major depressive disorder (HCC)    Pseudopolyposis of colon without complication, unspecified part of colon ([facility]    Rectal cancer ([facility]    Medicare annual wellness visit, subsequent    Anxiety        Chanda Christianson is a 76 y.o. male    Chief Complaint   Patient presents with    Medicare AWV   Depression and anxiety--seeing psychiatry-wakes up anxiety  Wants to make appt with his psych  Anxiety main issue  Inquiring about xanax  Appetite --poor  Wants help,  Wt Readings from Last 3 Encounters:   01/25/23 115 lb (52.2 kg)   12/09/22 110 lb (49.9 kg)   11/14/22 123 lb 6.4 oz (56 kg)     Labs reviewed        Nurse Only on 01/20/2023   Component Date Value Ref Range Status    Vit D, 25-Hydroxy 01/20/2023 25.2 (A)  30.0 - 100.0 ng/mL Final    TSH w Free Thyroid if Abnormal 01/20/2023 3.74  0.358 - 3.740 UIU/ML Final    Sodium 01/20/2023 135  133 - 143 mmol/L Final    Potassium 01/20/2023 4.4  3.5 - 5.1 mmol/L Final    Chloride 01/20/2023 101  101 - 110 mmol/L Final    CO2 01/20/2023 23  21 - 32 mmol/L Final    Anion Gap 01/20/2023 11  2 - 11 mmol/L Final    Glucose 01/20/2023 129 (A)  65 - 100 mg/dL Final    BUN 01/20/2023 18  8 - 23 MG/DL Final    Creatinine 01/20/2023 1.50  0.8 - 1.5 MG/DL Final    Est, Glom Filt Rate 01/20/2023 47 (A)  >60 ml/min/1.73m2 Final    Comment:   Pediatric calculator link: Leonor.at. org/professionals/kdoqi/gfr_calculatorped    These results are not intended for use in patients <25years of age. eGFR results are calculated without a race factor using  the 2021 CKD-EPI equation. Careful clinical correlation is recommended, particularly when comparing to results calculated using previous equations. The CKD-EPI equation is less accurate in patients with extremes of muscle mass, extra-renal metabolism of creatinine, excessive creatine ingestion, or following therapy that affects renal tubular secretion. Calcium 01/20/2023 10.0  8.3 - 10.4 MG/DL Final    Total Bilirubin 01/20/2023 0.6  0.2 - 1.1 MG/DL Final    ALT 01/20/2023 25  12 - 65 U/L Final    AST 01/20/2023 24  15 - 37 U/L Final    Alk Phosphatase 01/20/2023 89  50 - 136 U/L Final    Total Protein 01/20/2023 7.8  6.3 - 8.2 g/dL Final    Albumin 01/20/2023 3.9  3.2 - 4.6 g/dL Final    Globulin 01/20/2023 3.9  2.8 - 4.5 g/dL Final    Albumin/Globulin Ratio 01/20/2023 1.0  0.4 - 1.6   Final    Cholesterol, Total 01/20/2023 179  <200 MG/DL Final    Comment: Borderline High: 200-239 mg/dL  High: Greater than or equal to 240 mg/dL      Triglycerides 01/20/2023 108  35 - 150 MG/DL Final    Comment: Borderline High: 150-199 mg/dL, High: 200-499 mg/dL  Very High: Greater than or equal to 500 mg/dL      HDL 01/20/2023 78 (A)  40 - 60 MG/DL Final    LDL Calculated 01/20/2023 79.4  <100 MG/DL Final    Comment: Near Optimal: 100-129 mg/dL  Borderline High: 130-159, High: 160-189 mg/dL  Very High: Greater than or equal to 190 mg/dL      VLDL Cholesterol Calculated 01/20/2023 21.6  6.0 - 23.0 MG/DL Final    Chol/HDL Ratio 01/20/2023 2.3    Final    Ferritin 01/20/2023 60  8 - 388 NG/ML Final    Iron 01/20/2023 47  35 - 150 ug/dL Final    Comment: Known Interfering Substances section:  \"Iron values may be falsely elevated in  serum samples from patients with  anticoagulants (e.g., hemodialysis patients). \"  Limitations of Procedure section:  \"Turbidity resulting from precipitation of  fibrinogen in the serum of patients treated  with anticoagulants (e.g. hemodialysis  patients) may cause spuriously elevated  iron results. \"      WBC 01/20/2023 6.7  4.3 - 11.1 K/uL Final    RBC 01/20/2023 4.66  4.23 - 5.6 M/uL Final    Hemoglobin 01/20/2023 13.5 (A)  13.6 - 17.2 g/dL Final    Hematocrit 01/20/2023 44.3  41.1 - 50.3 % Final    MCV 01/20/2023 95.1  82 - 102 FL Final    MCH 01/20/2023 29.0  26.1 - 32.9 PG Final    MCHC 01/20/2023 30.5 (A)  31.4 - 35.0 g/dL Final    RDW 01/20/2023 14.3  11.9 - 14.6 % Final    Platelets 80/15/5867 215  150 - 450 K/uL Final    MPV 01/20/2023 10.7  9.4 - 12.3 FL Final    nRBC 01/20/2023 0.00  0.0 - 0.2 K/uL Final    **Note: Absolute NRBC parameter is now reported with Hemogram**    Differential Type 01/20/2023 AUTOMATED    Final    Seg Neutrophils 01/20/2023 89 (A)  43 - 78 % Final    Lymphocytes 01/20/2023 5 (A)  13 - 44 % Final    Monocytes 01/20/2023 6  4.0 - 12.0 % Final    Eosinophils % 01/20/2023 0 (A)  0.5 - 7.8 % Final    Basophils 01/20/2023 0  0.0 - 2.0 % Final    Immature Granulocytes 01/20/2023 0  0.0 - 5.0 % Final    Segs Absolute 01/20/2023 6.0  1.7 - 8.2 K/UL Final    Absolute Lymph # 01/20/2023 0.3 (A)  0.5 - 4.6 K/UL Final    Absolute Mono # 01/20/2023 0.4  0.1 - 1.3 K/UL Final    Absolute Eos # 01/20/2023 0.0  0.0 - 0.8 K/UL Final    Basophils Absolute 01/20/2023 0.0  0.0 - 0.2 K/UL Final    Absolute Immature Granulocyte 01/20/2023 0.0  0.0 - 0.5 K/UL Final        Past Medical History:   Diagnosis Date    Acute deep vein thrombosis (DVT) of non-extremity vein 5/20/2019    Cancer (HCC)     Family history of malignant neoplasm of gastrointestinal tract     mother colon/ovarian cancer 72    Fecal incontinence     LAR syndrome    John catheter in place 2022    patient stated- \"catheter due to them nicking his bladder and it won't heal\"    Former cigarette smoker     History of rectal cancer Hypothyroid     stable w/med    Infection and inflammatory reaction due to other internal prosthetic devices, implants and grafts, subsequent encounter 2017    abd wound/mesh colostomy    Insomnia     takes meds    Major depression     Osteoarthritis, hand     Personal history of colonic polyps 9/2013    x 1    Personal history of malignant neoplasm of rectum, rectosigmoid junction, and anus 2013    surgery and radiation    Psychiatric disorder     anxiety       Family History   Problem Relation Age of Onset    Cancer Mother         colon and ovarian    Cancer Brother         prostate    Alcohol Abuse Brother     Cancer Maternal Grandmother         bone    Alcohol Abuse Father     Stroke Paternal Grandfather     Alcohol Abuse Sister         Social History     Socioeconomic History    Marital status: Single     Spouse name: Not on file    Number of children: Not on file    Years of education: Not on file    Highest education level: Not on file   Occupational History    Not on file   Tobacco Use    Smoking status: Former     Packs/day: 1.00     Types: Cigarettes     Quit date: 1963     Years since quittin.2    Smokeless tobacco: Never   Substance and Sexual Activity    Alcohol use: Not Currently     Alcohol/week: 11.7 standard drinks    Drug use: No    Sexual activity: Not on file   Other Topics Concern    Not on file   Social History Narrative    Not on file     Social Determinants of Health     Financial Resource Strain: Not on file   Food Insecurity: Not on file   Transportation Needs: Not on file   Physical Activity: Sufficiently Active    Days of Exercise per Week: 7 days    Minutes of Exercise per Session: 30 min   Stress: Not on file   Social Connections: Not on file   Intimate Partner Violence: Not on file   Housing Stability: Not on file         Current Outpatient Medications:     clonazePAM (KLONOPIN) 2 MG tablet, nightly., Disp: , Rfl:     levothyroxine (SYNTHROID) 125 MCG tablet, TAKE ONE TABLET BY MOUTH ONE TIME DAILY BEFORE BREAKFAST, Disp: 30 tablet, Rfl: 11    mirtazapine (REMERON) 7.5 MG tablet, Take 1 tablet by mouth nightly, Disp: 90 tablet, Rfl: 1    amitriptyline (ELAVIL) 50 MG tablet, Take 1 tablet by mouth nightly, Disp: 30 tablet, Rfl: 1    carvedilol (COREG) 6.25 MG tablet, Take 1 tablet by mouth 2 times daily (with meals), Disp: 180 tablet, Rfl: 3    OLANZapine (ZYPREXA) 2.5 MG tablet, Take 7.5 mg by mouth nightly, Disp: , Rfl:     No Known Allergies      Review of Systems   Constitutional:  Negative for appetite change, chills, fatigue and fever. HENT:  Negative for sinus pain. Respiratory:  Negative for shortness of breath and wheezing. Cardiovascular:  Negative for chest pain. Gastrointestinal:  Negative for abdominal pain, constipation, diarrhea, nausea and vomiting. Genitourinary:  Negative for dysuria and frequency. Musculoskeletal:  Negative for myalgias. Neurological:  Negative for dizziness. Psychiatric/Behavioral:  Negative for suicidal ideas. All other systems reviewed and are negative. Vitals:    01/25/23 0909   BP: 122/70   Temp: 97.7 °F (36.5 °C)   TempSrc: Temporal   Weight: 115 lb (52.2 kg)   Height: 5' 8\" (1.727 m)           Physical Exam  Constitutional:       Appearance: He is not ill-appearing. Eyes:      Extraocular Movements: Extraocular movements intact. Pulmonary:      Effort: Pulmonary effort is normal. No respiratory distress. Neurological:      General: No focal deficit present. Mental Status: He is alert. Mental status is at baseline. Psychiatric:         Thought Content: Thought content normal.          Alvaro Roman was seen today for medicare aw.     Diagnoses and all orders for this visit:    Hypothyroidism, unspecified type  -     levothyroxine (SYNTHROID) 125 MCG tablet; TAKE ONE TABLET BY MOUTH ONE TIME DAILY BEFORE BREAKFAST    Essential (primary) hypertension    Appetite loss  -     mirtazapine (REMERON) 7.5 MG tablet; Take 1 tablet by mouth nightly    Depression, unspecified depression type  -     mirtazapine (REMERON) 7.5 MG tablet; Take 1 tablet by mouth nightly    Colostomy care ([address]    Severe protein-calorie malnutrition (HCC)    Mild episode of recurrent major depressive disorder ([address]    Pseudopolyposis of colon without complication, unspecified part of colon ([address]    Rectal cancer ([address]    Medicare annual wellness visit, subsequent    [address] Hamlin, DO      Medicare Annual Wellness Visit            Daljit Desir is here for Medicare AWV    Assessment & Plan   Hypothyroidism, unspecified type  -     levothyroxine (SYNTHROID) 125 MCG tablet; TAKE ONE TABLET BY MOUTH ONE TIME DAILY BEFORE BREAKFAST, Disp-30 tablet, R-11Normal  Essential (primary) hypertension  Appetite loss  -     mirtazapine (REMERON) 7.5 MG tablet; Take 1 tablet by mouth nightly, Disp-90 tablet, R-1Normal  Depression, unspecified depression type  -     mirtazapine (REMERON) 7.5 MG tablet; Take 1 tablet by mouth nightly, Disp-90 tablet, R-1Normal  Colostomy care (HCC)  Severe protein-calorie malnutrition (HCC)  Mild episode of recurrent major depressive disorder (HCC)  Pseudopolyposis of colon without complication, unspecified part of colon ([address]  Rectal cancer ([address]  Medicare annual wellness visit, subsequent  Anxiety      Recommendations for Preventive Services Due: see orders and patient instructions/AVS.  Recommended screening schedule for the next 5-10 years is provided to the patient in written form: see Patient Instructions/AVS.     Return for Medicare Annual Wellness Visit in 1 year. Subjective   The following acute and/or chronic problems were also addressed today:  Mejiajustyna Sawant was seen today for medicare awv.     Diagnoses and all orders for this visit:    Hypothyroidism, unspecified type  -     levothyroxine (SYNTHROID) 125 MCG tablet; TAKE ONE TABLET BY MOUTH ONE TIME DAILY BEFORE BREAKFAST    Essential (primary) hypertension    Appetite loss  -     mirtazapine (REMERON) 7.5 MG tablet; Take 1 tablet by mouth nightly    Depression, unspecified depression type  -     mirtazapine (REMERON) 7.5 MG tablet; Take 1 tablet by mouth nightly    Colostomy care ([facility]    Severe protein-calorie malnutrition (HCC)    Mild episode of recurrent major depressive disorder ([facility]    Pseudopolyposis of colon without complication, unspecified part of colon ([facility]    Rectal cancer ([facility]    Medicare annual wellness visit, subsequent    Anxiety        Patient's complete Health Risk Assessment and screening values have been reviewed and are found in Flowsheets. The following problems were reviewed today and where indicated follow up appointments were made and/or referrals ordered.     Positive Risk Factor Screenings with Interventions:        Depression:  PHQ-2 Score: 6  PHQ-9 Total Score: 12    Interpretation:   1-4 = minimal  5-9 = mild  10-14 = moderate  15-19 = moderately severe  20-27 = severe  Interventions:  See A/P for any pertinent orders           Social and Emotional Support:  Do you get the social and emotional support that you need?: (!) No  Interventions:  See A/P for plan and any pertinent orders    Weight and Activity:  Physical Activity: Sufficiently Active    Days of Exercise per Week: 7 days    Minutes of Exercise per Session: 30 min     On average, how many days per week do you engage in moderate to strenuous exercise (like a brisk walk)?: 7 days  Have you lost any weight without trying in the past 3 months?: (!) Yes  Body mass index: (!) 17.48      Unintentional Weight Loss Interventions:  See A/P for plan and any pertinent orders  Underweight Interventions:  See A/P for plan and any pertinent orders         Safety:  Do you have either shower bars, grab bars, non-slip mats or non-slip surfaces in your shower or bathtub?: (!) No  Interventions:  Patient declined any further interventions or treatment                     Objective Vitals:    01/25/23 0909   BP: 122/70   Temp: 97.7 °F (36.5 °C)   TempSrc: Temporal   Weight: 115 lb (52.2 kg)   Height: 5' 8\" (1.727 m)      Body mass index is 17.49 kg/m². No Known Allergies  Prior to Visit Medications    Medication Sig Taking? Authorizing Provider   clonazePAM (KLONOPIN) 2 MG tablet nightly.  Yes Historical Provider, MD   levothyroxine (SYNTHROID) 125 MCG tablet TAKE ONE TABLET BY MOUTH ONE TIME DAILY BEFORE BREAKFAST Yes Bucky Armenta DO   mirtazapine (REMERON) 7.5 MG tablet Take 1 tablet by mouth nightly Yes Kirk Dinh DO   amitriptyline (ELAVIL) 50 MG tablet Take 1 tablet by mouth nightly Yes Shalini Mancia MD   carvedilol (COREG) 6.25 MG tablet Take 1 tablet by mouth 2 times daily (with meals) Yes Bucky Armenta DO   OLANZapine (ZYPREXA) 2.5 MG tablet Take 7.5 mg by mouth nightly Yes Ar Automatic Reconciliation       CareTeam (Including outside providers/suppliers regularly involved in providing care):   Patient Care Team:  Kirk Dinh DO as PCP - [address] [place], [place] [address]DO as PCP - [facility]  Torri Shen MD as Referring Physician  Jennifer Shone, MD as Physician     Reviewed and updated this visit:  Allergies  Meds  Problems

## 2023-01-25 NOTE — PATIENT INSTRUCTIONS
Learning About Mindfulness for Stress  What are mindfulness and stress? Stress is what you feel when you have to handle more than you are used to. A lot of things can cause stress. You may feel stress when you go on a job interview, take a test, or run a race. This kind of short-term stress is normal and even useful. It can help you if you need to work hard or react quickly. Stress also can last a long time. Long-term stress is caused by stressful situations or events. Examples of long-term stress include long-term health problems, ongoing problems at work, and conflicts in your family. Long-term stress can harm your health. Mindfulness is a focus only on things happening in the present moment. It's a process of purposefully paying attention to and being aware of your surroundings, your emotions, your thoughts, and how your body feels. You are aware of these things, but you aren't judging these experiences as \"good\" or \"bad. \" Mindfulness can help you learn to calm your mind and body to help you cope with illness, pain, and stress. How does mindfulness help to relieve stress? Mindfulness can help quiet your mind and relax your body. Studies show that it can help some people sleep better, feel less anxious, and bring their blood pressure down. And it's been shown to help some people live and cope better with certain health problems like heart disease, depression, chronic pain, and cancer. How do you practice mindfulness? To be mindful is to pay attention, to be present, and to be accepting. When you're mindful, you do just one thing and you pay close attention to that one thing. For example, you may sit quietly and notice your emotions or how your food tastes and smells. When you're present, you focus on the things that are happening right now. You let go of your thoughts about the past and the future. When you dwell on the past or the future, you miss moments that can heal and strengthen you.  You may miss moments like hearing a child laugh or seeing a friendly face when you think you're all alone.  When you're accepting, you don't  the present moment. Instead you accept your thoughts and feelings as they come.  You can practice anytime, anywhere, and in any way you choose. You can practice in many ways. Here are a few ideas:  While doing your chores, like washing the dishes, let your mind focus on what's in your hand. What does the dish feel like? Is the water warm or cold?  Go outside and take a few deep breaths. What is the air like? Is it warm or cold?  When you can, take some time at the start of your day to sit alone and think.  Take a slow walk by yourself. Count your steps while you breathe in and out.  Try yoga breathing exercises, stretches, and poses to strengthen and relax your muscles.  At work, if you can, try to stop for a few moments each hour. Note how your body feels. Let yourself regroup and let your mind settle before you return to what you were doing.  If you struggle with anxiety or \"worry thoughts,\" imagine your mind as a blue marycruz and your worry thoughts as clouds. Now imagine those worry thoughts floating across your mind's marycruz. Just let them pass by as you watch.  Follow-up care is a key part of your treatment and safety. Be sure to make and go to all appointments, and call your doctor if you are having problems. It's also a good idea to know your test results and keep a list of the medicines you take.  Where can you learn more?  Go to https://www.Atlassian.net/patientEd and enter M676 to learn more about \"Learning About Mindfulness for Stress.\"  Current as of: February 9, 2022               Content Version: 13.5  © 9881-8041 "[employer].   Care instructions adapted under license by [employer]. If you have questions about a medical condition or this instruction, always ask your healthcare professional. "[employer] disclaims any warranty or liability for  your use of this information. For more information on your local Area Agency on Aging or [place] on Aging please visit the appropriate web site below:    [facility]: MobileCycles.pl    [address]: https://aging. [place].gov/    [place]: https://aging.[place].gov/    [place]: InsuranceSquad.es           Advance Directives: Care Instructions  Overview  An advance directive is a legal way to state your wishes at the end of your life. It tells your family and your doctor what to do if you can't say what you want. There are two main types of advance directives. You can change them any time your wishes change. Living will. This form tells your family and your doctor your wishes about life support and other treatment. The form is also called a declaration. Medical power of . This form lets you name a person to make treatment decisions for you when you can't speak for yourself. This person is called a health care agent (health care proxy, health care surrogate). The form is also called a durable power of  for health care. If you do not have an advance directive, decisions about your medical care may be made by a family member, or by a doctor or a  who doesn't know you. It may help to think of an advance directive as a gift to the people who care for you. If you have one, they won't have to make tough decisions by themselves. For more information, including forms for your state, see the [address] website (www.caringinfo.org/planning/advance-directives/). Follow-up care is a key part of your treatment and safety. Be sure to make and go to all appointments, and call your doctor if you are having problems. It's also a good idea to know your test results and keep a list of the medicines you take. What should you include in an advance directive? Many states have a unique advance directive form.  (It may ask you to address specific issues.) Or you might use a universal form that's approved by many states. If your form doesn't tell you what to address, it may be hard to know what to include in your advance directive. Use the questions below to help you get started. Who do you want to make decisions about your medical care if you are not able to? What life-support measures do you want if you have a serious illness that gets worse over time or can't be cured? What are you most afraid of that might happen? (Maybe you're afraid of having pain, losing your independence, or being kept alive by machines.)  Where would you prefer to die? (Your home? A hospital? A nursing home?)  Do you want to donate your organs when you die? Do you want certain Amish practices performed before you die? When should you call for help? Be sure to contact your doctor if you have any questions. Where can you learn more? Go to http://www.aguero.com/ and enter R264 to learn more about \"Advance Directives: Care Instructions. \"  Current as of: June 16, 2022               Content Version: 13.5  © 6916-0551 Healthwise, Incorporated. Care instructions adapted under license by [facility] ([facility]). If you have questions about a medical condition or this instruction, always ask your healthcare professional. [address] any warranty or liability for your use of this information. Personalized Preventive Plan for Bruno Nolasco - 1/25/2023  Medicare offers a range of preventive health benefits. Some of the tests and screenings are paid in full while other may be subject to a deductible, co-insurance, and/or copay. Some of these benefits include a comprehensive review of your medical history including lifestyle, illnesses that may run in your family, and various assessments and screenings as appropriate.     After reviewing your medical record and screening and assessments performed today your provider may have ordered immunizations, labs, imaging, and/or referrals for you. A list of these orders (if applicable) as well as your Preventive Care list are included within your After Visit Summary for your review. Other Preventive Recommendations:    A preventive eye exam performed by an eye specialist is recommended every 1-2 years to screen for glaucoma; cataracts, macular degeneration, and other eye disorders. A preventive dental visit is recommended every 6 months. Try to get at least 150 minutes of exercise per week or 10,000 steps per day on a pedometer . Order or download the FREE \"Exercise & Physical Activity: Your Everyday Guide\" from The [employer] on Aging. Call 9-814.356.1294 or search The [employer] on Aging online. You need 9967-3936 mg of calcium and 0069-9745 IU of vitamin D per day. It is possible to meet your calcium requirement with diet alone, but a vitamin D supplement is usually necessary to meet this goal.  When exposed to the sun, use a sunscreen that protects against both UVA and UVB radiation with an SPF of 30 or greater. Reapply every 2 to 3 hours or after sweating, drying off with a towel, or swimming. Always wear a seat belt when traveling in a car. Always wear a helmet when riding a bicycle or motorcycle.

## 2023-02-13 ENCOUNTER — NURSE ONLY (OUTPATIENT)
Dept: UROLOGY | Age: 78
End: 2023-02-13
Payer: MEDICARE

## 2023-02-13 DIAGNOSIS — R33.9 URINARY RETENTION: Primary | ICD-10-CM

## 2023-02-13 DIAGNOSIS — Z46.6 URINARY CATHETER CHANGE REQUIRED: ICD-10-CM

## 2023-02-13 PROCEDURE — 51702 INSERT TEMP BLADDER CATH: CPT | Performed by: UROLOGY

## 2023-02-13 NOTE — PROGRESS NOTES
Pt came in for catheter change. 20f coude carr catheter changed without incident. Patient tolerated procedure well.

## 2023-03-13 ENCOUNTER — NURSE ONLY (OUTPATIENT)
Dept: UROLOGY | Age: 78
End: 2023-03-13
Payer: MEDICARE

## 2023-03-13 DIAGNOSIS — Z46.6 URINARY CATHETER CHANGE REQUIRED: ICD-10-CM

## 2023-03-13 DIAGNOSIS — R33.9 URINARY RETENTION: Primary | ICD-10-CM

## 2023-03-13 PROCEDURE — 51702 INSERT TEMP BLADDER CATH: CPT | Performed by: UROLOGY

## 2023-03-22 ENCOUNTER — HOSPITAL ENCOUNTER (EMERGENCY)
Age: 78
Discharge: HOME | End: 2023-03-22
Attending: GENERAL PRACTICE
Payer: MEDICARE

## 2023-03-22 VITALS
SYSTOLIC BLOOD PRESSURE: 137 MMHG | WEIGHT: 125 LBS | BODY MASS INDEX: 18.94 KG/M2 | RESPIRATION RATE: 16 BRPM | DIASTOLIC BLOOD PRESSURE: 83 MMHG | HEIGHT: 68 IN | HEART RATE: 74 BPM | TEMPERATURE: 98.2 F | OXYGEN SATURATION: 96 %

## 2023-03-22 DIAGNOSIS — T83.9XXA PROBLEM WITH FOLEY CATHETER, INITIAL ENCOUNTER (HCC): Primary | ICD-10-CM

## 2023-03-22 PROCEDURE — 51702 INSERT TEMP BLADDER CATH: CPT

## 2023-03-22 PROCEDURE — 99283 EMERGENCY DEPT VISIT LOW MDM: CPT

## 2023-03-23 ENCOUNTER — NURSE ONLY (OUTPATIENT)
Dept: UROLOGY | Age: 78
End: 2023-03-23

## 2023-03-23 DIAGNOSIS — R33.9 URINARY RETENTION: Primary | ICD-10-CM

## 2023-03-23 NOTE — ED TRIAGE NOTES
Pt arrived via EMS from home c/o dislodged carr catheter. Pt reports urinating like normal with no pain. VSS stable en route with EMS.

## 2023-03-23 NOTE — PROGRESS NOTES
Patient came to office today with complaints of bladder spasms, and urine leaking out around the catheter. Upon examination, and talking to the patient, the catheter appeared to be seated properly, as he reported discomfort but not pain, and no bleeding around the catheter. Patient stated he had medication to help with bladder spasms, but had not been taking it. Patient was encouraged to take this medication and to increase hydration as his urine was a dark yellow in color.

## 2023-03-23 NOTE — ED NOTES
I have reviewed discharge instructions with the patient. The patient verbalized understanding. Patient left ED via Discharge Method: ambulatory to Home with self. Opportunity for questions and clarification provided. Patient given 0 scripts. To continue your aftercare when you leave the hospital, you may receive an automated call from our care team to check in on how you are doing. This is a free service and part of our promise to provide the best care and service to meet your aftercare needs.  If you have questions, or wish to unsubscribe from this service please call [phone]. Thank you for Choosing our [facility] Emergency Department.          Lucas Blanca RN  03/22/23 9205

## 2023-03-23 NOTE — ED PROVIDER NOTES
Emergency Department Provider Note                   PCP:                Kiya Jacques DO               Age: 76 y.o. Sex: male     DISPOSITION Decision To Discharge 03/22/2023 11:07:04 PM       ICD-10-CM    1. Problem with Carr catheter[facility]  T83. 9XXA           MEDICAL DECISION MAKING  Complexity of Problems Addressed:  1 acute uncomplicated illness    Data Reviewed and Analyzed:  Category 1:     I ordered each unique test.  I interpreted the results of each unique test.        Category 2:       Category 3: Discussion of management or test interpretation. Patient has Carr replaced and urine was flowing immediately. I suspect patient likely had a clot obstructing the Carr causing urinary retention. There is nothing to suggest UTI or upper tract infection or sepsis. Risk of Complications and/or Morbidity of Patient Management:  Considerations: The following items were considered but not ordered: Urine labs were considered but patient denied any infectious symptoms      Eagle Muir is a 76 y.o. male who presents to the Emergency Department with chief complaint of    Chief Complaint   Patient presents with    Other     Dislodged carr catheter      Patient states that he has not had any flow come out of his Carr catheter today. Patient does admit to some blood in the urine. He feels as though his bladder is getting full. Patient denies fevers or kidney pain or vomiting or any other symptoms at this time. Patient is just requesting that we change his Carr. Review of Systems   All other systems reviewed and are negative. Vitals signs and nursing note reviewed. Patient Vitals for the past 4 hrs:   Temp Pulse Resp BP SpO2   03/22/23 2115 -- -- 16 -- --   03/22/23 2105 98.2 °F (36.8 °C) 74 -- 137/83 96 %          Physical Exam  Constitutional:       General: He is not in acute distress. HENT:      Head: Normocephalic and atraumatic.       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    surgery and radiation    Psychiatric disorder     anxiety        Past Surgical History:   Procedure Laterality Date    COLONOSCOPY  last 2/3/15    Roberto--no polyps--3 year recall    COLONOSCOPY N/A 3/5/2021    COLONOSCOPY/BMI 19 performed by Abner Rabago MD at [facility] ENDOSCOPY    COLONOSCOPY N/A 2/12/2018    COLONOSCOPY / BMI=21 performed by Fani Marin MD at [place] [address]  2/12/2018         COLONOSCOPY THRU STOMA,LESN RMVL W/SNARE  3/5/2021         COLOSTOMY  5/11/16    CT RETROPERITONEAL PERC DRAIN  5/13/2021    CT RETROPERITONEAL PERC DRAIN 5/13/2021 [facility] RAD[facility]OLOGY CT SCAN    CT RETROPERITONEAL PERC DRAIN  5/20/2021    CT RETROPERITONEAL PERC DRAIN 5/20/2021 [facility]OLOGY CT SCAN    CYSTOSCOPY Right 12/9/2022    CYSTOSCOPY, RIGHT RETROGRADE PYELOGRAM, RIGHT URETERAL STENT EXCHANGE performed by Koby Diaz MD at [address] [place] [address]  4/2016    Sacral nerve stimlator lead trial (unsucessful) and removal    HERNIA REPAIR      and Colostomy in May    HERNIA REPAIR  3/2015, 5/2016    IR ABSCESS EVAL THRU EXISTING CATH  11/24/2021    IR ABSCESS EVAL THRU EXISTING CATH  6/4/2021    IR ABSCESS EVAL THRU EXISTING CATH  5/20/2021    IR NEPHROSTOGRAM EXISTING ACCESS  12/10/2021    IR NEPHROSTOGRAM EXISTING ACCESS  10/4/2021    IR NEPHROSTOMY EXCHANGE CATHETER  12/7/2021    IR NEPHROSTOMY EXCHANGE CATHETER  11/3/2021    IR NEPHROSTOMY EXCHANGE CATHETER  10/28/2021    IR NEPHROSTOMY PERCUTANEOUS LEFT  9/16/2021    IR NEPHROSTOMY PERCUTANEOUS LEFT  9/16/2021    IR NEPHROSTOMY PERCUTANEOUS LEFT 9/16/2021 [facility] SPECIALS    IR REPLCE T CVC WO PORT SAME ACC  5/30/2019    IR TUBE CHANGE  8/5/2021    IR TUBE

## 2023-03-29 ENCOUNTER — CARE COORDINATION (OUTPATIENT)
Dept: CARE COORDINATION | Facility: CLINIC | Age: 78
End: 2023-03-29

## 2023-03-30 ENCOUNTER — CARE COORDINATION (OUTPATIENT)
Dept: CARE COORDINATION | Facility: CLINIC | Age: 78
End: 2023-03-30

## 2023-03-30 NOTE — CARE COORDINATION
RNCM attempted to reach pt, no answer left 2nd HIPAA compliant vm. No further attempts at this time.

## 2023-04-24 ENCOUNTER — NURSE ONLY (OUTPATIENT)
Dept: UROLOGY | Age: 78
End: 2023-04-24
Payer: MEDICARE

## 2023-04-24 DIAGNOSIS — Z46.6 URINARY CATHETER CHANGE REQUIRED: ICD-10-CM

## 2023-04-24 DIAGNOSIS — R33.9 URINARY RETENTION: Primary | ICD-10-CM

## 2023-04-24 PROCEDURE — 51702 INSERT TEMP BLADDER CATH: CPT | Performed by: UROLOGY

## 2023-04-24 NOTE — PROGRESS NOTES
Pt came in for catheter change. 61E silicone carr catheter changed without incident. Patient tolerated procedure well.

## 2023-04-26 ENCOUNTER — OFFICE VISIT (OUTPATIENT)
Dept: INTERNAL MEDICINE CLINIC | Facility: CLINIC | Age: 78
End: 2023-04-26
Payer: MEDICARE

## 2023-04-26 VITALS
SYSTOLIC BLOOD PRESSURE: 102 MMHG | TEMPERATURE: 97.6 F | HEART RATE: 61 BPM | HEIGHT: 68 IN | WEIGHT: 117 LBS | BODY MASS INDEX: 17.73 KG/M2 | DIASTOLIC BLOOD PRESSURE: 68 MMHG | OXYGEN SATURATION: 98 %

## 2023-04-26 DIAGNOSIS — E55.9 VITAMIN D INSUFFICIENCY: ICD-10-CM

## 2023-04-26 DIAGNOSIS — D64.9 ANEMIA, UNSPECIFIED TYPE: ICD-10-CM

## 2023-04-26 DIAGNOSIS — R63.0 APPETITE LOSS: ICD-10-CM

## 2023-04-26 DIAGNOSIS — F33.1 MODERATE EPISODE OF RECURRENT MAJOR DEPRESSIVE DISORDER (HCC): ICD-10-CM

## 2023-04-26 DIAGNOSIS — F32.A DEPRESSION, UNSPECIFIED DEPRESSION TYPE: Primary | ICD-10-CM

## 2023-04-26 PROCEDURE — 1036F TOBACCO NON-USER: CPT | Performed by: INTERNAL MEDICINE

## 2023-04-26 PROCEDURE — G8419 CALC BMI OUT NRM PARAM NOF/U: HCPCS | Performed by: INTERNAL MEDICINE

## 2023-04-26 PROCEDURE — 99214 OFFICE O/P EST MOD 30 MIN: CPT | Performed by: INTERNAL MEDICINE

## 2023-04-26 PROCEDURE — G8427 DOCREV CUR MEDS BY ELIG CLIN: HCPCS | Performed by: INTERNAL MEDICINE

## 2023-04-26 PROCEDURE — 1123F ACP DISCUSS/DSCN MKR DOCD: CPT | Performed by: INTERNAL MEDICINE

## 2023-04-26 RX ORDER — AMITRIPTYLINE HYDROCHLORIDE 100 MG/1
TABLET, FILM COATED ORAL
COMMUNITY
Start: 2023-03-08

## 2023-04-26 RX ORDER — OLANZAPINE 5 MG/1
TABLET ORAL
COMMUNITY
Start: 2023-04-02

## 2023-04-26 RX ORDER — MIRTAZAPINE 15 MG/1
15 TABLET, FILM COATED ORAL NIGHTLY
Qty: 90 TABLET | Refills: 3 | Status: SHIPPED | OUTPATIENT
Start: 2023-04-26

## 2023-04-26 RX ORDER — HYDROXYZINE HYDROCHLORIDE 25 MG/1
TABLET, FILM COATED ORAL
COMMUNITY
Start: 2023-04-02

## 2023-04-26 SDOH — ECONOMIC STABILITY: FOOD INSECURITY: WITHIN THE PAST 12 MONTHS, THE FOOD YOU BOUGHT JUST DIDN'T LAST AND YOU DIDN'T HAVE MONEY TO GET MORE.: NEVER TRUE

## 2023-04-26 SDOH — ECONOMIC STABILITY: INCOME INSECURITY: HOW HARD IS IT FOR YOU TO PAY FOR THE VERY BASICS LIKE FOOD, HOUSING, MEDICAL CARE, AND HEATING?: NOT HARD AT ALL

## 2023-04-26 SDOH — ECONOMIC STABILITY: HOUSING INSECURITY
IN THE LAST 12 MONTHS, WAS THERE A TIME WHEN YOU DID NOT HAVE A STEADY PLACE TO SLEEP OR SLEPT IN A SHELTER (INCLUDING NOW)?: NO

## 2023-04-26 SDOH — ECONOMIC STABILITY: FOOD INSECURITY: WITHIN THE PAST 12 MONTHS, YOU WORRIED THAT YOUR FOOD WOULD RUN OUT BEFORE YOU GOT MONEY TO BUY MORE.: NEVER TRUE

## 2023-04-26 ASSESSMENT — PATIENT HEALTH QUESTIONNAIRE - PHQ9: DEPRESSION UNABLE TO ASSESS: PT REFUSES

## 2023-05-02 ASSESSMENT — ENCOUNTER SYMPTOMS
SINUS PAIN: 0
NAUSEA: 0
CONSTIPATION: 0
WHEEZING: 0
ABDOMINAL PAIN: 0
SHORTNESS OF BREATH: 0
DIARRHEA: 0
VOMITING: 0

## 2023-05-15 ENCOUNTER — NURSE ONLY (OUTPATIENT)
Dept: UROLOGY | Age: 78
End: 2023-05-15
Payer: MEDICARE

## 2023-05-15 DIAGNOSIS — Z46.6 URINARY CATHETER CHANGE REQUIRED: ICD-10-CM

## 2023-05-15 DIAGNOSIS — R33.9 URINARY RETENTION: Primary | ICD-10-CM

## 2023-05-15 PROCEDURE — 51702 INSERT TEMP BLADDER CATH: CPT | Performed by: UROLOGY

## 2023-05-15 NOTE — PROGRESS NOTES
Pt came in for q 3 weeks catheter change. 21S silicone carr catheter changed without incident. Patient tolerated procedure well.

## 2023-06-05 ENCOUNTER — NURSE ONLY (OUTPATIENT)
Dept: UROLOGY | Age: 78
End: 2023-06-05
Payer: MEDICARE

## 2023-06-05 DIAGNOSIS — Z46.6 URINARY CATHETER CHANGE REQUIRED: ICD-10-CM

## 2023-06-05 DIAGNOSIS — R33.9 URINARY RETENTION: Primary | ICD-10-CM

## 2023-06-05 PROCEDURE — 51702 INSERT TEMP BLADDER CATH: CPT | Performed by: UROLOGY

## 2023-06-05 NOTE — PROGRESS NOTES
Pt came in for catheter change. 05U silicone carr catheter changed without incident. Patient tolerated procedure well.

## 2023-06-16 ENCOUNTER — HOSPITAL ENCOUNTER (EMERGENCY)
Age: 78
Discharge: HOME | End: 2023-06-16
Attending: GENERAL PRACTICE
Payer: MEDICARE

## 2023-06-16 VITALS
WEIGHT: 110 LBS | HEIGHT: 68 IN | HEART RATE: 71 BPM | RESPIRATION RATE: 13 BRPM | SYSTOLIC BLOOD PRESSURE: 149 MMHG | OXYGEN SATURATION: 99 % | BODY MASS INDEX: 16.67 KG/M2 | TEMPERATURE: 98.2 F | DIASTOLIC BLOOD PRESSURE: 85 MMHG

## 2023-06-16 DIAGNOSIS — R53.1 GENERALIZED WEAKNESS: Primary | ICD-10-CM

## 2023-06-16 LAB
ALBUMIN SERPL-MCNC: 3.4 G/DL (ref 3.2–4.6)
ALBUMIN/GLOB SERPL: 0.9 (ref 0.4–1.6)
ALP SERPL-CCNC: 63 U/L (ref 50–136)
ALT SERPL-CCNC: 35 U/L (ref 12–65)
ANION GAP SERPL CALC-SCNC: 2 MMOL/L (ref 2–11)
AST SERPL-CCNC: 33 U/L (ref 15–37)
BASOPHILS # BLD: 0.1 K/UL (ref 0–0.2)
BASOPHILS NFR BLD: 1 % (ref 0–2)
BILIRUB SERPL-MCNC: 0.4 MG/DL (ref 0.2–1.1)
BUN SERPL-MCNC: 30 MG/DL (ref 8–23)
CALCIUM SERPL-MCNC: 8.6 MG/DL (ref 8.3–10.4)
CHLORIDE SERPL-SCNC: 107 MMOL/L (ref 101–110)
CO2 SERPL-SCNC: 28 MMOL/L (ref 21–32)
CREAT SERPL-MCNC: 1.7 MG/DL (ref 0.8–1.5)
DIFFERENTIAL METHOD BLD: ABNORMAL
EOSINOPHIL # BLD: 0.1 K/UL (ref 0–0.8)
EOSINOPHIL NFR BLD: 1 % (ref 0.5–7.8)
ERYTHROCYTE [DISTWIDTH] IN BLOOD BY AUTOMATED COUNT: 13.9 % (ref 11.9–14.6)
GLOBULIN SER CALC-MCNC: 3.6 G/DL (ref 2.8–4.5)
GLUCOSE SERPL-MCNC: 111 MG/DL (ref 65–100)
HCT VFR BLD AUTO: 38.6 % (ref 41.1–50.3)
HGB BLD-MCNC: 12.2 G/DL (ref 13.6–17.2)
IMM GRANULOCYTES # BLD AUTO: 0 K/UL (ref 0–0.5)
IMM GRANULOCYTES NFR BLD AUTO: 0 % (ref 0–5)
LYMPHOCYTES # BLD: 0.5 K/UL (ref 0.5–4.6)
LYMPHOCYTES NFR BLD: 10 % (ref 13–44)
MAGNESIUM SERPL-MCNC: 2.1 MG/DL (ref 1.8–2.4)
MCH RBC QN AUTO: 29.8 PG (ref 26.1–32.9)
MCHC RBC AUTO-ENTMCNC: 31.6 G/DL (ref 31.4–35)
MCV RBC AUTO: 94.1 FL (ref 82–102)
MONOCYTES # BLD: 0.5 K/UL (ref 0.1–1.3)
MONOCYTES NFR BLD: 8 % (ref 4–12)
NEUTS SEG # BLD: 4.4 K/UL (ref 1.7–8.2)
NEUTS SEG NFR BLD: 80 % (ref 43–78)
NRBC # BLD: 0 K/UL (ref 0–0.2)
PLATELET # BLD AUTO: 188 K/UL (ref 150–450)
PMV BLD AUTO: 9 FL (ref 9.4–12.3)
POTASSIUM SERPL-SCNC: 4.6 MMOL/L (ref 3.5–5.1)
PROT SERPL-MCNC: 7 G/DL (ref 6.3–8.2)
RBC # BLD AUTO: 4.1 M/UL (ref 4.23–5.6)
SODIUM SERPL-SCNC: 137 MMOL/L (ref 133–143)
WBC # BLD AUTO: 5.6 K/UL (ref 4.3–11.1)

## 2023-06-16 PROCEDURE — 83735 ASSAY OF MAGNESIUM: CPT

## 2023-06-16 PROCEDURE — 85025 COMPLETE CBC W/AUTO DIFF WBC: CPT

## 2023-06-16 PROCEDURE — 80053 COMPREHEN METABOLIC PANEL: CPT

## 2023-06-16 PROCEDURE — 99283 EMERGENCY DEPT VISIT LOW MDM: CPT

## 2023-06-16 ASSESSMENT — PAIN - FUNCTIONAL ASSESSMENT: PAIN_FUNCTIONAL_ASSESSMENT: NONE - DENIES PAIN

## 2023-06-16 NOTE — ED NOTES
I have reviewed discharge instructions with the patient. The patient verbalized understanding. Patient left ED via Discharge Method: ambulatory to Home with self. Opportunity for questions and clarification provided. Patient given 0 scripts. To continue your aftercare when you leave the hospital, you may receive an automated call from our care team to check in on how you are doing. This is a free service and part of our promise to provide the best care and service to meet your aftercare needs.  If you have questions, or wish to unsubscribe from this service please call [phone]. Thank you for Choosing our [facility] Emergency Department.         Cristina Richter RN  06/16/23 1346

## 2023-06-16 NOTE — CARE COORDINATION
CM met with patient and discussed his reason for the ER visit. Patient states he has become very weak within a couple of weeks and decided to come to ER. Patient states he lives alone and needed to see someone before he got where he couldn't walk. Patient made CM aware that he has a history of rehab . HH. Patient states he has had [facility] in the pass. Patient was agreeable to [facility] referral.  Referral completed. Patient was provided a RW from [facility]  Referral completed. CM made the ER MD ? RN aware that patient needs have been address and ready for discharge. CM will continue to follow for any needs, concerns or questions that mat arise. 06/16/23 1730   Service Assessment   Patient Orientation Alert and Oriented;Person;Place;Situation;Self   Cognition Alert   History Provided By Patient   Primary Caregiver Self   Patient's Healthcare Decision Maker is: Named in [address] [place] [address]   PCP Verified by CM Yes   Last Visit to PCP Within last 3 months   Prior Functional Level Independent in ADLs/IADLs   Current Functional Level Assistance with the following:;Mobility   Can patient return to prior living arrangement Yes   Ability to make needs known: Good   Family able to assist with home care needs: Yes   Would you like for me to discuss the discharge plan with any other family members/significant others, and if so, who? Yes   Financial Resources Medicare   Social/Functional History   Lives With Alone   Type of [address] One level   Bathroom Shower/Tub Tub/Shower unit; Walk-in shower   Bathroom Toilet Standard   Bathroom Equipment Commode   ADL Assistance Independent   Homemaking Assistance Independent   Homemaking Responsibilities Yes   Ambulation Assistance Needs assistance   Transfer Assistance Independent   Active  Yes   Mode of Transportation Car   Occupation Retired

## 2023-06-16 NOTE — ED NOTES
Pt confirmed a ride will be coming for him within 30 mins. Placed in lobby for ride.       Nagi Curry RN  06/16/23 2071

## 2023-06-16 NOTE — ED TRIAGE NOTES
Pt arrives to ed via ems from home. Increasing leg weakness for the last few years but more notably over the last few weeks. Typically able to walk it off, today he struggled.

## 2023-06-16 NOTE — ED NOTES
I have reviewed discharge instructions with the patient. The patient verbalized understanding. Patient left ED via Discharge Method: ambulatory to Home with friend. Opportunity for questions and clarification provided. Patient given 0 scripts. To continue your aftercare when you leave the hospital, you may receive an automated call from our care team to check in on how you are doing. This is a free service and part of our promise to provide the best care and service to meet your aftercare needs.  If you have questions, or wish to unsubscribe from this service please call [phone]. Thank you for Choosing our [facility] Emergency Department.         Rina Amin RN  06/16/23 8873

## 2023-06-16 NOTE — ED PROVIDER NOTES
Packs/day: 1.00     Types: Cigarettes     Quit date: 1963     Years since quittin.6    Smokeless tobacco: Never   Substance and Sexual Activity    Alcohol use: Not Currently     Alcohol/week: 11.7 standard drinks    Drug use: No     Social Determinants of Health     Financial Resource Strain: Low Risk     Difficulty of Paying Living Expenses: Not hard at all   Food Insecurity: No Food Insecurity    Worried About Running Out of Food in the Last Year: Never true    Ran Out of Food in the Last Year: Never true   Transportation Needs: Unknown    Lack of Transportation (Non-Medical): No   Physical Activity: Sufficiently Active    Days of Exercise per Week: 7 days    Minutes of Exercise per Session: 30 min   Housing Stability: Unknown    Unstable Housing in the Last Year: No        Previous Medications    AMITRIPTYLINE (ELAVIL) 100 MG TABLET        CARVEDILOL (COREG) 6.25 MG TABLET    Take 1 tablet by mouth 2 times daily (with meals)    CLONAZEPAM (KLONOPIN) 2 MG TABLET    nightly. HYDROXYZINE HCL (ATARAX) 25 MG TABLET    TAKE ONE-HALF TABLET BY MOUTH ONE TIME DAILY DURING THE DAY FOR ANXIETY AND TAKE ONE TO TWO TABLETS BY MOUTH AT BEDTIME FOR SLEEP    LEVOTHYROXINE (SYNTHROID) 125 MCG TABLET    TAKE ONE TABLET BY MOUTH ONE TIME DAILY BEFORE BREAKFAST    MIRTAZAPINE (REMERON) 15 MG TABLET    Take 1 tablet by mouth nightly    OLANZAPINE (ZYPREXA) 5 MG TABLET    TAKE ONE TABLET BY MOUTH TWICE A DAY AND TAKE TWO TABLETS BY MOUTH AT BEDTIME        No results found for any visits on 23. No orders to display                     Voice dictation software was used during the making of this note. This software is not perfect and grammatical and other typographical errors may be present. This note has not been completely proofread for errors.      Clay Torres, DO  23 [phone]

## 2023-06-21 ENCOUNTER — OFFICE VISIT (OUTPATIENT)
Dept: UROLOGY | Age: 78
End: 2023-06-21
Payer: MEDICARE

## 2023-06-21 DIAGNOSIS — S37.20XD: ICD-10-CM

## 2023-06-21 DIAGNOSIS — N13.30 HYDRONEPHROSIS, UNSPECIFIED HYDRONEPHROSIS TYPE: ICD-10-CM

## 2023-06-21 DIAGNOSIS — R33.9 URINARY RETENTION: Primary | ICD-10-CM

## 2023-06-21 PROCEDURE — 99214 OFFICE O/P EST MOD 30 MIN: CPT | Performed by: UROLOGY

## 2023-06-21 PROCEDURE — G8427 DOCREV CUR MEDS BY ELIG CLIN: HCPCS | Performed by: UROLOGY

## 2023-06-21 PROCEDURE — 1123F ACP DISCUSS/DSCN MKR DOCD: CPT | Performed by: UROLOGY

## 2023-06-21 PROCEDURE — 1036F TOBACCO NON-USER: CPT | Performed by: UROLOGY

## 2023-06-21 PROCEDURE — G8419 CALC BMI OUT NRM PARAM NOF/U: HCPCS | Performed by: UROLOGY

## 2023-06-21 ASSESSMENT — ENCOUNTER SYMPTOMS
VOMITING: 0
COUGH: 0
BLOOD IN STOOL: 0
SKIN LESIONS: 0
EYE DISCHARGE: 0
BACK PAIN: 0
CONSTIPATION: 0
HEARTBURN: 0
EYE PAIN: 0
WHEEZING: 0
SHORTNESS OF BREATH: 0
DIARRHEA: 0
ABDOMINAL PAIN: 0
INDIGESTION: 0
NAUSEA: 0

## 2023-06-21 NOTE — PROGRESS NOTES
Medical History:   Diagnosis Date    Acute deep vein thrombosis (DVT) of non-extremity vein 5/20/2019    Anemia     Cancer (HCC)     Family history of malignant neoplasm of gastrointestinal tract     mother colon/ovarian cancer 67    Fecal incontinence     LAR syndrome    John catheter in place 2022    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    surgery and radiation    Psychiatric disorder     anxiety    Thyroid disease      Past Surgical History:   Procedure Laterality Date    COLONOSCOPY  last 2/3/15    Roberto--no polyps--3 year recall    COLONOSCOPY N/A 3/5/2021    COLONOSCOPY/BMI 19 performed by Chad Rubalcava MD at [facility] N/A 2/12/2018    COLONOSCOPY / BMI=21 performed by Daniela Moore MD at [place] [address]  2/12/2018         COLONOSCOPY THRU STOMA,LESN RMVL W/SNARE  3/5/2021         COLOSTOMY  5/11/16    CT RETROPERITONEAL PERC DRAIN  5/13/2021    CT RETROPERITONEAL PERC DRAIN 5/13/2021 [facility]OLOGY CT SCAN    CT RETROPERITONEAL PERC DRAIN  5/20/2021    CT RETROPERITONEAL PERC DRAIN 5/20/2021 [facility]OLOGY CT SCAN    CYSTOSCOPY Right 12/9/2022    CYSTOSCOPY, RIGHT RETROGRADE PYELOGRAM, RIGHT URETERAL STENT EXCHANGE performed by Ziggy Zambrano MD at [address] [place] [address]    GI  4/2016    Sacral nerve stimlator lead trial (unsucessful) and removal    HERNIA REPAIR      and Colostomy in May    HERNIA REPAIR  3/2015, 5/2016    IR ABSCESS EVAL THRU EXISTING CATH  11/24/2021

## 2023-06-23 ENCOUNTER — CARE COORDINATION (OUTPATIENT)
Dept: CARE COORDINATION | Facility: CLINIC | Age: 78
End: 2023-06-23

## 2023-06-28 ENCOUNTER — ANESTHESIA EVENT (OUTPATIENT)
Dept: SURGERY | Age: 78
End: 2023-06-28
Payer: MEDICARE

## 2023-06-29 ENCOUNTER — ANESTHESIA (OUTPATIENT)
Dept: SURGERY | Age: 78
End: 2023-06-29
Payer: MEDICARE

## 2023-06-29 ENCOUNTER — TELEPHONE (OUTPATIENT)
Dept: UROLOGY | Age: 78
End: 2023-06-29

## 2023-06-29 ENCOUNTER — HOSPITAL ENCOUNTER (OUTPATIENT)
Age: 78
Setting detail: OUTPATIENT SURGERY
Discharge: HOME | End: 2023-06-29
Attending: UROLOGY | Admitting: UROLOGY
Payer: MEDICARE

## 2023-06-29 VITALS
HEART RATE: 75 BPM | RESPIRATION RATE: 14 BRPM | BODY MASS INDEX: 17.45 KG/M2 | OXYGEN SATURATION: 100 % | SYSTOLIC BLOOD PRESSURE: 170 MMHG | HEIGHT: 69 IN | TEMPERATURE: 98.3 F | DIASTOLIC BLOOD PRESSURE: 93 MMHG | WEIGHT: 117.8 LBS

## 2023-06-29 DIAGNOSIS — R33.9 URINARY RETENTION: Primary | ICD-10-CM

## 2023-06-29 DIAGNOSIS — S37.20XD: ICD-10-CM

## 2023-06-29 DIAGNOSIS — N13.30 HYDRONEPHROSIS OF RIGHT KIDNEY: ICD-10-CM

## 2023-06-29 LAB
APPEARANCE UR: ABNORMAL
BACTERIA URNS QL MICRO: ABNORMAL /HPF
BILIRUB UR QL: NEGATIVE
CASTS URNS QL MICRO: ABNORMAL /LPF
COLOR UR: ABNORMAL
CRYSTALS URNS QL MICRO: ABNORMAL /LPF
GLUCOSE UR STRIP.AUTO-MCNC: NEGATIVE MG/DL
HGB UR QL STRIP: ABNORMAL
KETONES UR QL STRIP.AUTO: NEGATIVE MG/DL
LEUKOCYTE ESTERASE UR QL STRIP.AUTO: ABNORMAL
NITRITE UR QL STRIP.AUTO: NEGATIVE
OTHER OBSERVATIONS: ABNORMAL
PH UR STRIP: 7.5 (ref 5–9)
PROT UR STRIP-MCNC: 300 MG/DL
RBC #/AREA URNS HPF: >100 /HPF
SP GR UR REFRACTOMETRY: 1.01 (ref 1–1.02)
UROBILINOGEN UR QL STRIP.AUTO: 0.2 EU/DL (ref 0.2–1)
WBC URNS QL MICRO: >100 /HPF

## 2023-06-29 PROCEDURE — 52332 CYSTOSCOPY AND TREATMENT: CPT | Performed by: UROLOGY

## 2023-06-29 PROCEDURE — 7100000000 HC PACU RECOVERY - FIRST 15 MIN: Performed by: UROLOGY

## 2023-06-29 PROCEDURE — C1769 GUIDE WIRE: HCPCS | Performed by: UROLOGY

## 2023-06-29 PROCEDURE — C1758 CATHETER, URETERAL: HCPCS | Performed by: UROLOGY

## 2023-06-29 PROCEDURE — 6360000002 HC RX W HCPCS: Performed by: UROLOGY

## 2023-06-29 PROCEDURE — 7100000001 HC PACU RECOVERY - ADDTL 15 MIN: Performed by: UROLOGY

## 2023-06-29 PROCEDURE — 3600000004 HC SURGERY LEVEL 4 BASE: Performed by: UROLOGY

## 2023-06-29 PROCEDURE — 6370000000 HC RX 637 (ALT 250 FOR IP): Performed by: ANESTHESIOLOGY

## 2023-06-29 PROCEDURE — 7100000010 HC PHASE II RECOVERY - FIRST 15 MIN: Performed by: UROLOGY

## 2023-06-29 PROCEDURE — 81001 URINALYSIS AUTO W/SCOPE: CPT

## 2023-06-29 PROCEDURE — 3600000014 HC SURGERY LEVEL 4 ADDTL 15MIN: Performed by: UROLOGY

## 2023-06-29 PROCEDURE — 2709999900 HC NON-CHARGEABLE SUPPLY: Performed by: UROLOGY

## 2023-06-29 PROCEDURE — 2580000003 HC RX 258: Performed by: ANESTHESIOLOGY

## 2023-06-29 PROCEDURE — 3700000001 HC ADD 15 MINUTES (ANESTHESIA): Performed by: UROLOGY

## 2023-06-29 PROCEDURE — 6360000002 HC RX W HCPCS: Performed by: NURSE ANESTHETIST, CERTIFIED REGISTERED

## 2023-06-29 PROCEDURE — 7100000011 HC PHASE II RECOVERY - ADDTL 15 MIN: Performed by: UROLOGY

## 2023-06-29 PROCEDURE — 2500000003 HC RX 250 WO HCPCS: Performed by: NURSE ANESTHETIST, CERTIFIED REGISTERED

## 2023-06-29 PROCEDURE — 74420 UROGRAPHY RTRGR +-KUB: CPT | Performed by: UROLOGY

## 2023-06-29 PROCEDURE — C2617 STENT, NON-COR, TEM W/O DEL: HCPCS | Performed by: UROLOGY

## 2023-06-29 PROCEDURE — 6360000004 HC RX CONTRAST MEDICATION: Performed by: UROLOGY

## 2023-06-29 PROCEDURE — 3700000000 HC ANESTHESIA ATTENDED CARE: Performed by: UROLOGY

## 2023-06-29 DEVICE — URETERAL STENT
Type: IMPLANTABLE DEVICE | Site: URETER | Status: FUNCTIONAL
Brand: PERCUFLEX™ PLUS

## 2023-06-29 RX ORDER — HYOSCYAMINE SULFATE 0.12 MG/1
1 TABLET SUBLINGUAL EVERY 4 HOURS PRN
Qty: 30 EACH | Refills: 1 | Status: SHIPPED | OUTPATIENT
Start: 2023-06-29

## 2023-06-29 RX ORDER — FENTANYL CITRATE 50 UG/ML
25 INJECTION, SOLUTION INTRAMUSCULAR; INTRAVENOUS EVERY 5 MIN PRN
Status: DISCONTINUED | OUTPATIENT
Start: 2023-06-29 | End: 2023-06-29 | Stop reason: HOSPADM

## 2023-06-29 RX ORDER — FENTANYL CITRATE 50 UG/ML
100 INJECTION, SOLUTION INTRAMUSCULAR; INTRAVENOUS
Status: DISCONTINUED | OUTPATIENT
Start: 2023-06-29 | End: 2023-06-29 | Stop reason: HOSPADM

## 2023-06-29 RX ORDER — MIDAZOLAM HYDROCHLORIDE 2 MG/2ML
2 INJECTION, SOLUTION INTRAMUSCULAR; INTRAVENOUS
Status: DISCONTINUED | OUTPATIENT
Start: 2023-06-29 | End: 2023-06-29 | Stop reason: HOSPADM

## 2023-06-29 RX ORDER — DIPHENHYDRAMINE HYDROCHLORIDE 50 MG/ML
12.5 INJECTION INTRAMUSCULAR; INTRAVENOUS
Status: DISCONTINUED | OUTPATIENT
Start: 2023-06-29 | End: 2023-06-29 | Stop reason: HOSPADM

## 2023-06-29 RX ORDER — ONDANSETRON 2 MG/ML
4 INJECTION INTRAMUSCULAR; INTRAVENOUS
Status: DISCONTINUED | OUTPATIENT
Start: 2023-06-29 | End: 2023-06-29 | Stop reason: HOSPADM

## 2023-06-29 RX ORDER — LIDOCAINE HYDROCHLORIDE 20 MG/ML
INJECTION, SOLUTION EPIDURAL; INFILTRATION; INTRACAUDAL; PERINEURAL PRN
Status: DISCONTINUED | OUTPATIENT
Start: 2023-06-29 | End: 2023-06-29 | Stop reason: SDUPTHER

## 2023-06-29 RX ORDER — SODIUM CHLORIDE, SODIUM LACTATE, POTASSIUM CHLORIDE, CALCIUM CHLORIDE 600; 310; 30; 20 MG/100ML; MG/100ML; MG/100ML; MG/100ML
INJECTION, SOLUTION INTRAVENOUS CONTINUOUS
Status: DISCONTINUED | OUTPATIENT
Start: 2023-06-29 | End: 2023-06-29 | Stop reason: HOSPADM

## 2023-06-29 RX ORDER — DEXAMETHASONE SODIUM PHOSPHATE 4 MG/ML
INJECTION, SOLUTION INTRA-ARTICULAR; INTRALESIONAL; INTRAMUSCULAR; INTRAVENOUS; SOFT TISSUE PRN
Status: DISCONTINUED | OUTPATIENT
Start: 2023-06-29 | End: 2023-06-29 | Stop reason: SDUPTHER

## 2023-06-29 RX ORDER — HYDROMORPHONE HYDROCHLORIDE 2 MG/ML
0.5 INJECTION, SOLUTION INTRAMUSCULAR; INTRAVENOUS; SUBCUTANEOUS EVERY 10 MIN PRN
Status: DISCONTINUED | OUTPATIENT
Start: 2023-06-29 | End: 2023-06-29 | Stop reason: HOSPADM

## 2023-06-29 RX ORDER — EPHEDRINE SULFATE/0.9% NACL/PF 50 MG/5 ML
SYRINGE (ML) INTRAVENOUS PRN
Status: DISCONTINUED | OUTPATIENT
Start: 2023-06-29 | End: 2023-06-29 | Stop reason: SDUPTHER

## 2023-06-29 RX ORDER — SCOLOPAMINE TRANSDERMAL SYSTEM 1 MG/1
1 PATCH, EXTENDED RELEASE TRANSDERMAL
Status: DISCONTINUED | OUTPATIENT
Start: 2023-06-29 | End: 2023-06-29 | Stop reason: HOSPADM

## 2023-06-29 RX ORDER — SODIUM CHLORIDE 0.9 % (FLUSH) 0.9 %
5-40 SYRINGE (ML) INJECTION EVERY 12 HOURS SCHEDULED
Status: DISCONTINUED | OUTPATIENT
Start: 2023-06-29 | End: 2023-06-29 | Stop reason: HOSPADM

## 2023-06-29 RX ORDER — PROPOFOL 10 MG/ML
INJECTION, EMULSION INTRAVENOUS PRN
Status: DISCONTINUED | OUTPATIENT
Start: 2023-06-29 | End: 2023-06-29 | Stop reason: SDUPTHER

## 2023-06-29 RX ORDER — OXYCODONE HYDROCHLORIDE AND ACETAMINOPHEN 5; 325 MG/1; MG/1
1 TABLET ORAL EVERY 6 HOURS PRN
Qty: 20 TABLET | Refills: 0 | Status: SHIPPED | OUTPATIENT
Start: 2023-06-29 | End: 2023-07-04

## 2023-06-29 RX ORDER — OXYCODONE HYDROCHLORIDE 5 MG/1
5 TABLET ORAL
Status: DISCONTINUED | OUTPATIENT
Start: 2023-06-29 | End: 2023-06-29 | Stop reason: HOSPADM

## 2023-06-29 RX ORDER — ONDANSETRON 2 MG/ML
INJECTION INTRAMUSCULAR; INTRAVENOUS PRN
Status: DISCONTINUED | OUTPATIENT
Start: 2023-06-29 | End: 2023-06-29 | Stop reason: SDUPTHER

## 2023-06-29 RX ORDER — CEPHALEXIN 500 MG/1
500 CAPSULE ORAL 2 TIMES DAILY
Qty: 6 CAPSULE | Refills: 0 | Status: SHIPPED | OUTPATIENT
Start: 2023-06-29 | End: 2023-07-02

## 2023-06-29 RX ADMIN — Medication 2000 MG: at 08:06

## 2023-06-29 RX ADMIN — PHENYLEPHRINE HYDROCHLORIDE 100 MCG: 0.1 INJECTION, SOLUTION INTRAVENOUS at 08:16

## 2023-06-29 RX ADMIN — SODIUM CHLORIDE, POTASSIUM CHLORIDE, SODIUM LACTATE AND CALCIUM CHLORIDE: 600; 310; 30; 20 INJECTION, SOLUTION INTRAVENOUS at 07:21

## 2023-06-29 RX ADMIN — Medication 10 MG: at 08:21

## 2023-06-29 RX ADMIN — Medication 10 MG: at 08:18

## 2023-06-29 RX ADMIN — LIDOCAINE HYDROCHLORIDE 100 MG: 20 INJECTION, SOLUTION EPIDURAL; INFILTRATION; INTRACAUDAL; PERINEURAL at 08:01

## 2023-06-29 RX ADMIN — PROPOFOL 120 MG: 10 INJECTION, EMULSION INTRAVENOUS at 08:01

## 2023-06-29 RX ADMIN — ONDANSETRON 4 MG: 2 INJECTION INTRAMUSCULAR; INTRAVENOUS at 08:04

## 2023-06-29 RX ADMIN — PHENYLEPHRINE HYDROCHLORIDE 100 MCG: 0.1 INJECTION, SOLUTION INTRAVENOUS at 08:11

## 2023-06-29 RX ADMIN — PHENYLEPHRINE HYDROCHLORIDE 100 MCG: 0.1 INJECTION, SOLUTION INTRAVENOUS at 08:18

## 2023-06-29 RX ADMIN — PHENYLEPHRINE HYDROCHLORIDE 200 MCG: 0.1 INJECTION, SOLUTION INTRAVENOUS at 08:21

## 2023-06-29 RX ADMIN — DEXAMETHASONE SODIUM PHOSPHATE 4 MG: 4 INJECTION, SOLUTION INTRAMUSCULAR; INTRAVENOUS at 08:04

## 2023-06-29 ASSESSMENT — PAIN - FUNCTIONAL ASSESSMENT: PAIN_FUNCTIONAL_ASSESSMENT: 0-10

## 2023-06-29 NOTE — TELEPHONE ENCOUNTER
----- Message from Soni Johansen MD sent at 2023  8:41 AM EDT -----  [address]: [facility]    Surgeon: Lorelei Torres    Assist: NONE    Diagnosis: Right Ureter Stricture / Hydronephrosis    Procedure: Cystoscopy, Right Retrograde Pyelogram, Right Ureteral Stent Exchange, John Catheter Placement     Posting time: 30 minutes     Special Instruments Needed:  NONE    Anesthesia: GENERAL    Labs:     Tests: EKG per anesthesia    Blood: NONE    Bowel Prep: NONE    Special Instructions: Schedule in 6 months (late Dec 2023 or 2024.   Needs pre-op H&P with me 1-2 weeks prior     Orders/HandP:     Follow up appointment: TBD

## 2023-06-29 NOTE — TELEPHONE ENCOUNTER
Procedures: Procedure(s):   CYSTOSCOPY RETROGRADE PYELOGRAM, RIGHT   CYSTOSCOPY URETERAL STENT EXCHANGE, OLSON CATHETER EXCHANGE/PLACEMENT   Date: 12/21/2023   Time: 0800   Location: [facility] OR  CYSTO     Scheduled. Please complete orders.

## 2023-07-07 ENCOUNTER — CARE COORDINATION (OUTPATIENT)
Dept: CARE COORDINATION | Facility: CLINIC | Age: 78
End: 2023-07-07

## 2023-07-07 NOTE — CARE COORDINATION
Outreach attempted today to discuss enrollment in ACM services. Unable to reach patient. Left HIPAA compliant voice mail. Also sent message through [address] [place] [address]. Will notify ACM.

## 2023-07-08 ENCOUNTER — HOSPITAL ENCOUNTER (EMERGENCY)
Age: 78
Discharge: HOME | End: 2023-07-08
Attending: STUDENT IN AN ORGANIZED HEALTH CARE EDUCATION/TRAINING PROGRAM
Payer: MEDICARE

## 2023-07-08 VITALS
OXYGEN SATURATION: 97 % | SYSTOLIC BLOOD PRESSURE: 174 MMHG | RESPIRATION RATE: 14 BRPM | HEART RATE: 73 BPM | WEIGHT: 120 LBS | DIASTOLIC BLOOD PRESSURE: 91 MMHG | HEIGHT: 64 IN | BODY MASS INDEX: 20.49 KG/M2 | TEMPERATURE: 98.4 F

## 2023-07-08 DIAGNOSIS — N39.0 URINARY TRACT INFECTION ASSOCIATED WITH INDWELLING URETHRAL CATHETER, INITIAL ENCOUNTER (HCC): ICD-10-CM

## 2023-07-08 DIAGNOSIS — T83.511A URINARY TRACT INFECTION ASSOCIATED WITH INDWELLING URETHRAL CATHETER, INITIAL ENCOUNTER (HCC): ICD-10-CM

## 2023-07-08 DIAGNOSIS — R42 DIZZINESS: Primary | ICD-10-CM

## 2023-07-08 LAB
ALBUMIN SERPL-MCNC: 3.1 G/DL (ref 3.2–4.6)
ALBUMIN/GLOB SERPL: 0.8 (ref 0.4–1.6)
ALP SERPL-CCNC: 57 U/L (ref 50–136)
ALT SERPL-CCNC: 29 U/L (ref 12–65)
ANION GAP SERPL CALC-SCNC: 4 MMOL/L (ref 2–11)
APPEARANCE UR: ABNORMAL
AST SERPL-CCNC: 26 U/L (ref 15–37)
BACTERIA URNS QL MICRO: ABNORMAL /HPF
BASOPHILS # BLD: 0 K/UL (ref 0–0.2)
BASOPHILS NFR BLD: 1 % (ref 0–2)
BILIRUB SERPL-MCNC: 0.3 MG/DL (ref 0.2–1.1)
BILIRUB UR QL: NEGATIVE
BUN SERPL-MCNC: 26 MG/DL (ref 8–23)
CALCIUM SERPL-MCNC: 9.1 MG/DL (ref 8.3–10.4)
CASTS URNS QL MICRO: ABNORMAL /LPF
CHLORIDE SERPL-SCNC: 104 MMOL/L (ref 101–110)
CO2 SERPL-SCNC: 28 MMOL/L (ref 21–32)
COLOR UR: ABNORMAL
CREAT SERPL-MCNC: 1.7 MG/DL (ref 0.8–1.5)
DIFFERENTIAL METHOD BLD: ABNORMAL
EKG ATRIAL RATE: 72 BPM
EKG DIAGNOSIS: NORMAL
EKG P AXIS: 62 DEGREES
EKG P-R INTERVAL: 178 MS
EKG Q-T INTERVAL: 413 MS
EKG QRS DURATION: 138 MS
EKG QTC CALCULATION (BAZETT): 449 MS
EKG R AXIS: 187 DEGREES
EKG T AXIS: 54 DEGREES
EKG VENTRICULAR RATE: 71 BPM
EOSINOPHIL # BLD: 0.1 K/UL (ref 0–0.8)
EOSINOPHIL NFR BLD: 2 % (ref 0.5–7.8)
ERYTHROCYTE [DISTWIDTH] IN BLOOD BY AUTOMATED COUNT: 13.6 % (ref 11.9–14.6)
GLOBULIN SER CALC-MCNC: 3.7 G/DL (ref 2.8–4.5)
GLUCOSE SERPL-MCNC: 102 MG/DL (ref 65–100)
GLUCOSE UR STRIP.AUTO-MCNC: NEGATIVE MG/DL
HCT VFR BLD AUTO: 38.4 % (ref 41.1–50.3)
HGB BLD-MCNC: 12.1 G/DL (ref 13.6–17.2)
HGB UR QL STRIP: ABNORMAL
IMM GRANULOCYTES # BLD AUTO: 0 K/UL (ref 0–0.5)
IMM GRANULOCYTES NFR BLD AUTO: 0 % (ref 0–5)
KETONES UR QL STRIP.AUTO: NEGATIVE MG/DL
LEUKOCYTE ESTERASE UR QL STRIP.AUTO: ABNORMAL
LYMPHOCYTES # BLD: 0.5 K/UL (ref 0.5–4.6)
LYMPHOCYTES NFR BLD: 11 % (ref 13–44)
MAGNESIUM SERPL-MCNC: 2 MG/DL (ref 1.8–2.4)
MCH RBC QN AUTO: 29.9 PG (ref 26.1–32.9)
MCHC RBC AUTO-ENTMCNC: 31.5 G/DL (ref 31.4–35)
MCV RBC AUTO: 94.8 FL (ref 82–102)
MONOCYTES # BLD: 0.4 K/UL (ref 0.1–1.3)
MONOCYTES NFR BLD: 10 % (ref 4–12)
NEUTS SEG # BLD: 3.1 K/UL (ref 1.7–8.2)
NEUTS SEG NFR BLD: 76 % (ref 43–78)
NITRITE UR QL STRIP.AUTO: POSITIVE
NRBC # BLD: 0 K/UL (ref 0–0.2)
OTHER OBSERVATIONS: ABNORMAL
PH UR STRIP: 6.5 (ref 5–9)
PLATELET # BLD AUTO: 165 K/UL (ref 150–450)
PMV BLD AUTO: 8.8 FL (ref 9.4–12.3)
POTASSIUM SERPL-SCNC: 4.4 MMOL/L (ref 3.5–5.1)
PROT SERPL-MCNC: 6.8 G/DL (ref 6.3–8.2)
PROT UR STRIP-MCNC: 100 MG/DL
RBC # BLD AUTO: 4.05 M/UL (ref 4.23–5.6)
RBC #/AREA URNS HPF: ABNORMAL /HPF
SODIUM SERPL-SCNC: 136 MMOL/L (ref 133–143)
SP GR UR REFRACTOMETRY: 1.01 (ref 1–1.02)
UROBILINOGEN UR QL STRIP.AUTO: 0.2 EU/DL (ref 0.2–1)
WBC # BLD AUTO: 4.1 K/UL (ref 4.3–11.1)
WBC URNS QL MICRO: >100 /HPF

## 2023-07-08 PROCEDURE — 96374 THER/PROPH/DIAG INJ IV PUSH: CPT

## 2023-07-08 PROCEDURE — 2580000003 HC RX 258: Performed by: STUDENT IN AN ORGANIZED HEALTH CARE EDUCATION/TRAINING PROGRAM

## 2023-07-08 PROCEDURE — 99284 EMERGENCY DEPT VISIT MOD MDM: CPT

## 2023-07-08 PROCEDURE — 80053 COMPREHEN METABOLIC PANEL: CPT

## 2023-07-08 PROCEDURE — 87086 URINE CULTURE/COLONY COUNT: CPT

## 2023-07-08 PROCEDURE — 6360000002 HC RX W HCPCS: Performed by: STUDENT IN AN ORGANIZED HEALTH CARE EDUCATION/TRAINING PROGRAM

## 2023-07-08 PROCEDURE — 93005 ELECTROCARDIOGRAM TRACING: CPT | Performed by: STUDENT IN AN ORGANIZED HEALTH CARE EDUCATION/TRAINING PROGRAM

## 2023-07-08 PROCEDURE — 85025 COMPLETE CBC W/AUTO DIFF WBC: CPT

## 2023-07-08 PROCEDURE — 81001 URINALYSIS AUTO W/SCOPE: CPT

## 2023-07-08 PROCEDURE — 83735 ASSAY OF MAGNESIUM: CPT

## 2023-07-08 PROCEDURE — 93010 ELECTROCARDIOGRAM REPORT: CPT | Performed by: INTERNAL MEDICINE

## 2023-07-08 RX ORDER — CEFPODOXIME PROXETIL 100 MG/1
200 TABLET, FILM COATED ORAL 2 TIMES DAILY
Qty: 40 TABLET | Refills: 0 | Status: SHIPPED | OUTPATIENT
Start: 2023-07-08 | End: 2023-07-18

## 2023-07-08 RX ORDER — 0.9 % SODIUM CHLORIDE 0.9 %
1000 INTRAVENOUS SOLUTION INTRAVENOUS
Status: COMPLETED | OUTPATIENT
Start: 2023-07-08 | End: 2023-07-08

## 2023-07-08 RX ADMIN — SODIUM CHLORIDE 1000 MG: 900 INJECTION INTRAVENOUS at 13:04

## 2023-07-08 RX ADMIN — SODIUM CHLORIDE 1000 ML: 9 INJECTION, SOLUTION INTRAVENOUS at 10:36

## 2023-07-08 ASSESSMENT — LIFESTYLE VARIABLES
HOW OFTEN DO YOU HAVE A DRINK CONTAINING ALCOHOL: NEVER
HOW MANY STANDARD DRINKS CONTAINING ALCOHOL DO YOU HAVE ON A TYPICAL DAY: PATIENT DOES NOT DRINK

## 2023-07-08 NOTE — ED TRIAGE NOTES
Pt arrives via [facility] from home for complaint of bilateral leg weakness. Pt reports pt is now having difficulty with balancing.

## 2023-07-08 NOTE — ED PROVIDER NOTES
Emergency Department Provider Note       PCP: Radha Kapoor DO   Age: 76 y.o. Sex: male     DISPOSITION Decision To Discharge 07/08/2023 02:27:07 PM       ICD-10-CM    1. Dizziness  R42       2. Urinary tract infection associated with indwelling urethral catheter, initial encounter ([address])  T83.511A     N39.0           Medical Decision Making     Complexity of Problems Addressed:  1 or more chronic illnesses with a severe exacerbation or progression. 1 or more acute illnesses that pose a threat to life or bodily function. Data Reviewed and Analyzed:  Category 1:   I independently ordered and reviewed each unique test.  I reviewed external records: provider visit note from PCP. I reviewed external records: provider visit note from outside specialist.  I reviewed external records: Previous ER visit        Category 2:       Category 3: Discussion of management or test interpretation. This is a 64-year-old male patient with a history of chronic weakness presenting with progressive, ongoing weakness in lower extremities and now instability/dizziness. Orders placed for basic labs, EKG and 1 L of fluids. Patient denies any ongoing feelings of imbalance, lightheadedness or dizziness. He has no focal neurodeficits on my exam.  His symptoms have been intermittent and resolved with rest.  He does not report any rotational type dizziness or visual changes. He does exhibit clinical evidence of some dehydration as well as a small elevation in his creatinine. He has received fluids in this department, has stable orthostatic vital signs and a reassuring work-up otherwise. There is evidence of bacteria present on urinalysis. I will move to send urine culture and cover with antibiotics while we wait for this result. I feel patient is stable for discharge with close outpatient follow-up.   Patient agreeable with this plan of care  Risk of Complications and/or Morbidity of Patient Management:  Prescription drug

## 2023-07-08 NOTE — ED NOTES
Ambulated pt through ED hallway per orders. Pt ambulated well and stated he feels like he is at his baseline. Pt reports he has a walker at home that he would begin using per previous instruction.       Piero Jasso RN  07/08/23 3067

## 2023-07-08 NOTE — DISCHARGE INSTRUCTIONS
Your urinalysis showed evidence of bacteria present in the urine. This is fairly common finding with a catheter in place however could indicate infection. We have sent the urine test for culture and given you a dose of antibiotics as well as outpatient antibiotics to take. Drink plenty of clear liquids to ensure hydration and eat regular meals throughout the day. Arrange follow-up with your primary care provider within 1 week. Return for worsening symptoms, concerns or questions.

## 2023-07-09 LAB
BACTERIA SPEC CULT: NORMAL
BACTERIA SPEC CULT: NORMAL
SERVICE CMNT-IMP: NORMAL

## 2023-07-10 ENCOUNTER — CARE COORDINATION (OUTPATIENT)
Dept: CARE COORDINATION | Facility: CLINIC | Age: 78
End: 2023-07-10

## 2023-07-10 NOTE — CARE COORDINATION
Second outreach attempted today to discuss enrollment in ACM services. Unable to reach patient. Left HIPAA compliant voice mail. Will notify ACM.

## 2023-07-13 ENCOUNTER — HOSPITAL ENCOUNTER (OUTPATIENT)
Age: 78
Setting detail: OBSERVATION
Discharge: HOME | End: 2023-07-14
Attending: STUDENT IN AN ORGANIZED HEALTH CARE EDUCATION/TRAINING PROGRAM | Admitting: HOSPITALIST
Payer: MEDICARE

## 2023-07-13 ENCOUNTER — APPOINTMENT (OUTPATIENT)
Dept: CT IMAGING | Age: 78
End: 2023-07-13
Payer: MEDICARE

## 2023-07-13 DIAGNOSIS — R63.0 APPETITE LOSS: ICD-10-CM

## 2023-07-13 DIAGNOSIS — F33.1 MODERATE EPISODE OF RECURRENT MAJOR DEPRESSIVE DISORDER (HCC): ICD-10-CM

## 2023-07-13 DIAGNOSIS — F32.A DEPRESSION, UNSPECIFIED DEPRESSION TYPE: ICD-10-CM

## 2023-07-13 DIAGNOSIS — D64.9 ANEMIA, UNSPECIFIED TYPE: ICD-10-CM

## 2023-07-13 DIAGNOSIS — R53.1 WEAKNESS: Primary | ICD-10-CM

## 2023-07-13 DIAGNOSIS — E55.9 VITAMIN D INSUFFICIENCY: ICD-10-CM

## 2023-07-13 PROBLEM — R26.2 DIFFICULTY IN WALKING: Status: ACTIVE | Noted: 2023-07-13

## 2023-07-13 LAB
ALBUMIN SERPL-MCNC: 2.9 G/DL (ref 3.2–4.6)
ALBUMIN/GLOB SERPL: 0.8 (ref 0.4–1.6)
ALP SERPL-CCNC: 55 U/L (ref 50–136)
ALT SERPL-CCNC: 27 U/L (ref 12–65)
ANION GAP SERPL CALC-SCNC: 3 MMOL/L (ref 2–11)
APPEARANCE UR: ABNORMAL
AST SERPL-CCNC: 24 U/L (ref 15–37)
BACTERIA URNS QL MICRO: ABNORMAL /HPF
BASOPHILS # BLD: 0 K/UL (ref 0–0.2)
BASOPHILS NFR BLD: 1 % (ref 0–2)
BILIRUB SERPL-MCNC: 0.3 MG/DL (ref 0.2–1.1)
BILIRUB UR QL: NEGATIVE
BUN SERPL-MCNC: 25 MG/DL (ref 8–23)
CALCIUM SERPL-MCNC: 8.8 MG/DL (ref 8.3–10.4)
CASTS URNS QL MICRO: 0 /LPF
CHLORIDE SERPL-SCNC: 108 MMOL/L (ref 101–110)
CK SERPL-CCNC: 121 U/L (ref 21–215)
CK SERPL-CCNC: 68 U/L (ref 21–215)
CO2 SERPL-SCNC: 29 MMOL/L (ref 21–32)
COLOR UR: ABNORMAL
CREAT SERPL-MCNC: 1.4 MG/DL (ref 0.8–1.5)
CRYSTALS URNS QL MICRO: 0 /LPF
DIFFERENTIAL METHOD BLD: ABNORMAL
EKG ATRIAL RATE: 74 BPM
EKG DIAGNOSIS: NORMAL
EKG P AXIS: 50 DEGREES
EKG P-R INTERVAL: 154 MS
EKG Q-T INTERVAL: 422 MS
EKG QRS DURATION: 143 MS
EKG QTC CALCULATION (BAZETT): 469 MS
EKG R AXIS: 62 DEGREES
EKG T AXIS: 53 DEGREES
EKG VENTRICULAR RATE: 74 BPM
EOSINOPHIL # BLD: 0.1 K/UL (ref 0–0.8)
EOSINOPHIL NFR BLD: 2 % (ref 0.5–7.8)
EPI CELLS #/AREA URNS HPF: ABNORMAL /HPF
ERYTHROCYTE [DISTWIDTH] IN BLOOD BY AUTOMATED COUNT: 13.4 % (ref 11.9–14.6)
GLOBULIN SER CALC-MCNC: 3.5 G/DL (ref 2.8–4.5)
GLUCOSE SERPL-MCNC: 94 MG/DL (ref 65–100)
GLUCOSE UR STRIP.AUTO-MCNC: NEGATIVE MG/DL
HCT VFR BLD AUTO: 37.1 % (ref 41.1–50.3)
HGB BLD-MCNC: 11.9 G/DL (ref 13.6–17.2)
HGB UR QL STRIP: ABNORMAL
IMM GRANULOCYTES # BLD AUTO: 0 K/UL (ref 0–0.5)
IMM GRANULOCYTES NFR BLD AUTO: 0 % (ref 0–5)
KETONES UR QL STRIP.AUTO: NEGATIVE MG/DL
LEUKOCYTE ESTERASE UR QL STRIP.AUTO: ABNORMAL
LYMPHOCYTES # BLD: 0.4 K/UL (ref 0.5–4.6)
LYMPHOCYTES NFR BLD: 7 % (ref 13–44)
MAGNESIUM SERPL-MCNC: 1.9 MG/DL (ref 1.8–2.4)
MAGNESIUM SERPL-MCNC: 2 MG/DL (ref 1.8–2.4)
MCH RBC QN AUTO: 29.8 PG (ref 26.1–32.9)
MCHC RBC AUTO-ENTMCNC: 32.1 G/DL (ref 31.4–35)
MCV RBC AUTO: 92.8 FL (ref 82–102)
MONOCYTES # BLD: 0.4 K/UL (ref 0.1–1.3)
MONOCYTES NFR BLD: 7 % (ref 4–12)
MUCOUS THREADS URNS QL MICRO: 0 /LPF
NEUTS SEG # BLD: 5 K/UL (ref 1.7–8.2)
NEUTS SEG NFR BLD: 83 % (ref 43–78)
NITRITE UR QL STRIP.AUTO: POSITIVE
NRBC # BLD: 0 K/UL (ref 0–0.2)
PH UR STRIP: 5.5 (ref 5–9)
PLATELET # BLD AUTO: 158 K/UL (ref 150–450)
PMV BLD AUTO: 9.1 FL (ref 9.4–12.3)
POTASSIUM SERPL-SCNC: 4.1 MMOL/L (ref 3.5–5.1)
PROT SERPL-MCNC: 6.4 G/DL (ref 6.3–8.2)
PROT UR STRIP-MCNC: 100 MG/DL
RBC # BLD AUTO: 4 M/UL (ref 4.23–5.6)
RBC #/AREA URNS HPF: ABNORMAL /HPF
SODIUM SERPL-SCNC: 140 MMOL/L (ref 133–143)
SP GR UR REFRACTOMETRY: 1.01 (ref 1–1.02)
TROPONIN I SERPL HS-MCNC: 6.1 PG/ML (ref 0–14)
TSH W FREE THYROID IF ABNORMAL: 1.71 UIU/ML (ref 0.36–3.74)
URINE CULTURE IF INDICATED: ABNORMAL
UROBILINOGEN UR QL STRIP.AUTO: 0.2 EU/DL (ref 0.2–1)
VIT B12 SERPL-MCNC: 1520 PG/ML (ref 193–986)
WBC # BLD AUTO: 6 K/UL (ref 4.3–11.1)
WBC URNS QL MICRO: ABNORMAL /HPF

## 2023-07-13 PROCEDURE — 85025 COMPLETE CBC W/AUTO DIFF WBC: CPT

## 2023-07-13 PROCEDURE — 93010 ELECTROCARDIOGRAM REPORT: CPT | Performed by: INTERNAL MEDICINE

## 2023-07-13 PROCEDURE — G0378 HOSPITAL OBSERVATION PER HR: HCPCS

## 2023-07-13 PROCEDURE — 87186 SC STD MICRODIL/AGAR DIL: CPT

## 2023-07-13 PROCEDURE — 93005 ELECTROCARDIOGRAM TRACING: CPT | Performed by: STUDENT IN AN ORGANIZED HEALTH CARE EDUCATION/TRAINING PROGRAM

## 2023-07-13 PROCEDURE — 82550 ASSAY OF CK (CPK): CPT

## 2023-07-13 PROCEDURE — 99285 EMERGENCY DEPT VISIT HI MDM: CPT

## 2023-07-13 PROCEDURE — 87086 URINE CULTURE/COLONY COUNT: CPT

## 2023-07-13 PROCEDURE — 2580000003 HC RX 258: Performed by: NURSE PRACTITIONER

## 2023-07-13 PROCEDURE — 87088 URINE BACTERIA CULTURE: CPT

## 2023-07-13 PROCEDURE — 81001 URINALYSIS AUTO W/SCOPE: CPT

## 2023-07-13 PROCEDURE — 6370000000 HC RX 637 (ALT 250 FOR IP): Performed by: HOSPITALIST

## 2023-07-13 PROCEDURE — 2500000003 HC RX 250 WO HCPCS: Performed by: HOSPITALIST

## 2023-07-13 PROCEDURE — 82607 VITAMIN B-12: CPT

## 2023-07-13 PROCEDURE — 96366 THER/PROPH/DIAG IV INF ADDON: CPT

## 2023-07-13 PROCEDURE — 51702 INSERT TEMP BLADDER CATH: CPT

## 2023-07-13 PROCEDURE — 96361 HYDRATE IV INFUSION ADD-ON: CPT

## 2023-07-13 PROCEDURE — 6370000000 HC RX 637 (ALT 250 FOR IP): Performed by: INTERNAL MEDICINE

## 2023-07-13 PROCEDURE — 96372 THER/PROPH/DIAG INJ SC/IM: CPT

## 2023-07-13 PROCEDURE — 84484 ASSAY OF TROPONIN QUANT: CPT

## 2023-07-13 PROCEDURE — 70450 CT HEAD/BRAIN W/O DYE: CPT

## 2023-07-13 PROCEDURE — 84443 ASSAY THYROID STIM HORMONE: CPT

## 2023-07-13 PROCEDURE — 6360000002 HC RX W HCPCS: Performed by: NURSE PRACTITIONER

## 2023-07-13 PROCEDURE — 83735 ASSAY OF MAGNESIUM: CPT

## 2023-07-13 PROCEDURE — 96365 THER/PROPH/DIAG IV INF INIT: CPT

## 2023-07-13 PROCEDURE — APPSS45 APP SPLIT SHARED TIME 31-45 MINUTES: Performed by: NURSE PRACTITIONER

## 2023-07-13 PROCEDURE — 80053 COMPREHEN METABOLIC PANEL: CPT

## 2023-07-13 PROCEDURE — 6360000002 HC RX W HCPCS: Performed by: HOSPITALIST

## 2023-07-13 PROCEDURE — 2580000003 HC RX 258: Performed by: HOSPITALIST

## 2023-07-13 RX ORDER — SODIUM CHLORIDE 0.9 % (FLUSH) 0.9 %
5-40 SYRINGE (ML) INJECTION EVERY 12 HOURS SCHEDULED
Status: DISCONTINUED | OUTPATIENT
Start: 2023-07-13 | End: 2023-07-14 | Stop reason: HOSPADM

## 2023-07-13 RX ORDER — ACETAMINOPHEN 325 MG/1
650 TABLET ORAL EVERY 6 HOURS PRN
Status: DISCONTINUED | OUTPATIENT
Start: 2023-07-13 | End: 2023-07-14 | Stop reason: HOSPADM

## 2023-07-13 RX ORDER — MAGNESIUM SULFATE IN WATER 40 MG/ML
2000 INJECTION, SOLUTION INTRAVENOUS PRN
Status: DISCONTINUED | OUTPATIENT
Start: 2023-07-13 | End: 2023-07-14 | Stop reason: HOSPADM

## 2023-07-13 RX ORDER — HYDROXYZINE HYDROCHLORIDE 25 MG/1
75 TABLET, FILM COATED ORAL
Status: DISCONTINUED | OUTPATIENT
Start: 2023-07-13 | End: 2023-07-14 | Stop reason: HOSPADM

## 2023-07-13 RX ORDER — ACETAMINOPHEN 650 MG/1
650 SUPPOSITORY RECTAL EVERY 6 HOURS PRN
Status: DISCONTINUED | OUTPATIENT
Start: 2023-07-13 | End: 2023-07-14 | Stop reason: HOSPADM

## 2023-07-13 RX ORDER — OLANZAPINE 5 MG/1
15 TABLET ORAL NIGHTLY
Status: DISCONTINUED | OUTPATIENT
Start: 2023-07-13 | End: 2023-07-14 | Stop reason: HOSPADM

## 2023-07-13 RX ORDER — MIRTAZAPINE 15 MG/1
15 TABLET, FILM COATED ORAL NIGHTLY
Status: DISCONTINUED | OUTPATIENT
Start: 2023-07-13 | End: 2023-07-14 | Stop reason: HOSPADM

## 2023-07-13 RX ORDER — AMITRIPTYLINE HYDROCHLORIDE 50 MG/1
100 TABLET, FILM COATED ORAL NIGHTLY
Status: DISCONTINUED | OUTPATIENT
Start: 2023-07-13 | End: 2023-07-14

## 2023-07-13 RX ORDER — SODIUM CHLORIDE 9 MG/ML
INJECTION, SOLUTION INTRAVENOUS PRN
Status: DISCONTINUED | OUTPATIENT
Start: 2023-07-13 | End: 2023-07-14 | Stop reason: HOSPADM

## 2023-07-13 RX ORDER — LANOLIN ALCOHOL/MO/W.PET/CERES
400 CREAM (GRAM) TOPICAL 2 TIMES DAILY
Status: DISCONTINUED | OUTPATIENT
Start: 2023-07-13 | End: 2023-07-14 | Stop reason: HOSPADM

## 2023-07-13 RX ORDER — CARVEDILOL 3.12 MG/1
6.25 TABLET ORAL 2 TIMES DAILY WITH MEALS
Status: DISCONTINUED | OUTPATIENT
Start: 2023-07-13 | End: 2023-07-14 | Stop reason: HOSPADM

## 2023-07-13 RX ORDER — SODIUM CHLORIDE 0.9 % (FLUSH) 0.9 %
5-40 SYRINGE (ML) INJECTION PRN
Status: DISCONTINUED | OUTPATIENT
Start: 2023-07-13 | End: 2023-07-14 | Stop reason: HOSPADM

## 2023-07-13 RX ORDER — POTASSIUM CHLORIDE 7.45 MG/ML
10 INJECTION INTRAVENOUS PRN
Status: DISCONTINUED | OUTPATIENT
Start: 2023-07-13 | End: 2023-07-14 | Stop reason: HOSPADM

## 2023-07-13 RX ORDER — ONDANSETRON 2 MG/ML
4 INJECTION INTRAMUSCULAR; INTRAVENOUS EVERY 6 HOURS PRN
Status: DISCONTINUED | OUTPATIENT
Start: 2023-07-13 | End: 2023-07-14 | Stop reason: HOSPADM

## 2023-07-13 RX ORDER — POTASSIUM CHLORIDE 20 MEQ/1
40 TABLET, EXTENDED RELEASE ORAL PRN
Status: DISCONTINUED | OUTPATIENT
Start: 2023-07-13 | End: 2023-07-14 | Stop reason: HOSPADM

## 2023-07-13 RX ORDER — LANOLIN ALCOHOL/MO/W.PET/CERES
100 CREAM (GRAM) TOPICAL DAILY
Status: DISCONTINUED | OUTPATIENT
Start: 2023-07-20 | End: 2023-07-14 | Stop reason: HOSPADM

## 2023-07-13 RX ORDER — LEVOTHYROXINE SODIUM 0.12 MG/1
125 TABLET ORAL DAILY
Status: DISCONTINUED | OUTPATIENT
Start: 2023-07-14 | End: 2023-07-14 | Stop reason: HOSPADM

## 2023-07-13 RX ORDER — ENOXAPARIN SODIUM 100 MG/ML
40 INJECTION SUBCUTANEOUS DAILY
Status: DISCONTINUED | OUTPATIENT
Start: 2023-07-13 | End: 2023-07-14 | Stop reason: HOSPADM

## 2023-07-13 RX ORDER — CLONAZEPAM 1 MG/1
4 TABLET ORAL NIGHTLY
Status: DISCONTINUED | OUTPATIENT
Start: 2023-07-13 | End: 2023-07-14 | Stop reason: HOSPADM

## 2023-07-13 RX ORDER — SODIUM CHLORIDE, SODIUM LACTATE, POTASSIUM CHLORIDE, CALCIUM CHLORIDE 600; 310; 30; 20 MG/100ML; MG/100ML; MG/100ML; MG/100ML
INJECTION, SOLUTION INTRAVENOUS CONTINUOUS
Status: DISCONTINUED | OUTPATIENT
Start: 2023-07-13 | End: 2023-07-14

## 2023-07-13 RX ORDER — POLYETHYLENE GLYCOL 3350 17 G/17G
17 POWDER, FOR SOLUTION ORAL DAILY PRN
Status: DISCONTINUED | OUTPATIENT
Start: 2023-07-13 | End: 2023-07-14 | Stop reason: HOSPADM

## 2023-07-13 RX ORDER — ONDANSETRON 4 MG/1
4 TABLET, ORALLY DISINTEGRATING ORAL EVERY 8 HOURS PRN
Status: DISCONTINUED | OUTPATIENT
Start: 2023-07-13 | End: 2023-07-14 | Stop reason: HOSPADM

## 2023-07-13 RX ORDER — OLANZAPINE 5 MG/1
5 TABLET ORAL NIGHTLY
Status: DISCONTINUED | OUTPATIENT
Start: 2023-07-13 | End: 2023-07-13

## 2023-07-13 RX ADMIN — CLONAZEPAM 4 MG: 1 TABLET ORAL at 22:02

## 2023-07-13 RX ADMIN — OLANZAPINE 15 MG: 5 TABLET, FILM COATED ORAL at 22:01

## 2023-07-13 RX ADMIN — THIAMINE HYDROCHLORIDE 500 MG: 100 INJECTION, SOLUTION INTRAMUSCULAR; INTRAVENOUS at 22:08

## 2023-07-13 RX ADMIN — MIRTAZAPINE 15 MG: 15 TABLET, FILM COATED ORAL at 22:02

## 2023-07-13 RX ADMIN — ENOXAPARIN SODIUM 40 MG: 40 INJECTION SUBCUTANEOUS at 18:14

## 2023-07-13 RX ADMIN — SODIUM CHLORIDE, POTASSIUM CHLORIDE, SODIUM LACTATE AND CALCIUM CHLORIDE: 600; 310; 30; 20 INJECTION, SOLUTION INTRAVENOUS at 18:21

## 2023-07-13 RX ADMIN — TUBERCULIN PURIFIED PROTEIN DERIVATIVE 5 UNITS: 5 INJECTION, SOLUTION INTRADERMAL at 18:15

## 2023-07-13 RX ADMIN — SODIUM CHLORIDE, PRESERVATIVE FREE 10 ML: 5 INJECTION INTRAVENOUS at 21:00

## 2023-07-13 RX ADMIN — AMITRIPTYLINE HYDROCHLORIDE 100 MG: 50 TABLET, FILM COATED ORAL at 22:02

## 2023-07-13 RX ADMIN — MAGNESIUM GLUCONATE 500 MG ORAL TABLET 400 MG: 500 TABLET ORAL at 22:02

## 2023-07-13 RX ADMIN — MAGNESIUM GLUCONATE 500 MG ORAL TABLET 400 MG: 500 TABLET ORAL at 18:13

## 2023-07-13 RX ADMIN — CARVEDILOL 6.25 MG: 3.12 TABLET, FILM COATED ORAL at 18:13

## 2023-07-13 RX ADMIN — HYDROXYZINE HYDROCHLORIDE 75 MG: 25 TABLET, FILM COATED ORAL at 22:02

## 2023-07-13 RX ADMIN — CEFEPIME 2000 MG: 2 INJECTION, POWDER, FOR SOLUTION INTRAVENOUS at 18:12

## 2023-07-13 ASSESSMENT — PAIN SCALES - GENERAL: PAINLEVEL_OUTOF10: 0

## 2023-07-13 NOTE — CONSULTS
Lam Aldridge Ala, MD        acetaminophen (TYLENOL) tablet 650 mg  650 mg Oral Q6H PRN Lam Aldridge Ala, MD        Or    acetaminophen (TYLENOL) suppository 650 mg  650 mg Rectal Q6H PRN Lam Aldridge Ala, MD        lactated ringers IV soln infusion   IntraVENous Continuous Janice Swan MD        potassium chloride (KLOR-CON M) extended release tablet 40 mEq  40 mEq Oral PRN Lam Aldridge Ala, MD        Or    potassium bicarb-citric acid (EFFER-K) effervescent tablet 40 mEq  40 mEq Oral PRN Lam Aldridge Ala, MD        Or    potassium chloride 10 mEq/100 mL IVPB (Peripheral Line)  10 mEq IntraVENous PRN Lam Aldridge Ala, MD        potassium chloride 10 mEq/100 mL IVPB (Peripheral Line)  10 mEq IntraVENous PRN Lam Aldridge Ala, MD        magnesium sulfate 2000 mg in 50 mL IVPB premix  2,000 mg IntraVENous PRN Lam Aldridge Ala, MD        magnesium oxide (MAG-OX) tablet 400 mg  400 mg Oral BID Janice Swan MD        ceFEPIme (MAXIPIME) 2,000 mg in sodium chloride 0.9 % 50 mL IVPB (mini-bag)  2,000 mg IntraVENous Once Janice Swan MD        Followed by    Tierra Dowd ON 7/14/2023] ceFEPIme (MAXIPIME) 2,000 mg in sodium chloride 0.9 % 50 mL IVPB (mini-bag)  2,000 mg IntraVENous Q24H Janice Swan MD            No Known Allergies    Review of Systems:  CONSTITUTIONAL:  Denies weight loss, fever, chills. Endorses difficulty walking. HEENT:  Eyes:  Denies visual loss, blurred vision, double vision or yellow sclerae. Ears, Nose, Throat:  Denies hearing loss, sneezing, congestion, runny nose or sore throat. SKIN:  Denies rash or itching. CARDIOVASCULAR:  Denies chest pain, chest pressure or chest discomfort. No palpitations or edema. RESPIRATORY:  Denies shortness of breath, cough or sputum. GASTROINTESTINAL:  Denies anorexia, nausea, vomiting or diarrhea. No abdominal pain or blood. GENITOURINARY:  Denies burning with urination. NEUROLOGICAL:  Denies headache, dizziness, syncope, paralysis, ataxia, numbness or tingling in the extremities.  Denies change in bowel or bladder

## 2023-07-13 NOTE — ED NOTES
TRANSFER - OUT REPORT:    Verbal report given to Aiden Abarca RN on [address]  being transferred to  for routine progression of patient care       Report consisted of patient's Situation, Background, Assessment and   Recommendations(SBAR). Information from the following report(s) Nurse Handoff Report, Index, ED Encounter Summary, and ED SBAR was reviewed with the receiving nurse. [place] Fall Assessment:    Presents to emergency department  because of falls (Syncope, seizure, or loss of consciousness): No  Age > 70: No  Altered Mental Status, Intoxication with alcohol or substance confusion (Disorientation, impaired judgment, poor safety awaremess, or inability to follow instructions): No  Impaired Mobility: Ambulates or transfers with assistive devices or assistance; Unable to ambulate or transer.: No  Nursing Judgement: No          Lines:   Peripheral IV 07/13/23 Proximal;Right; Anterior Forearm (Active)        Opportunity for questions and clarification was provided.       Patient transported with:  Fili Malagon RN  07/13/23 1480

## 2023-07-13 NOTE — ACP (ADVANCE CARE PLANNING)
Advance Care Planning   Healthcare Decision Maker:    Primary Decision Maker: Matias Lalo - Niece/Nephew - [phone]    Patient confirms this person as POA and he is asked to provide a copy of his POA document. Click here to complete Healthcare Decision Makers including selection of the Healthcare Decision Maker Relationship (ie \"Primary\").

## 2023-07-13 NOTE — ED PROVIDER NOTES
Emergency Department Provider Note       PCP: Pedro Leonardo DO   Age: 76 y.o. Sex: male     DISPOSITION Admitted 07/13/2023 12:48:42 PM       ICD-10-CM    1. Weakness  R53.1           Medical Decision Making     Complexity of Problems Addressed:  1 or more acute illnesses that pose a threat to life or bodily function. Data Reviewed and Analyzed:  Category 1:   I independently ordered and reviewed each unique test.         Category 2:   I interpreted the CT Scan CT imaging shows no ICH. Category 3: Discussion of management or test interpretation. 75-year-old male patient returns to this department with progressive weakness affecting his ability to ambulate at home. This has been an ongoing issue for several years though patient reports significant worsening symptoms over the past week. Neurologic exam is fairly stable with comparison to previous visit. He has chronic weakness in the right lower extremity and some mild weakness in the left lower extremity. We will repeat labs, obtain CT head. He will likely require admission for further evaluation and potentially rehab placement. The patient was admitted and I have discussed patient management with the admitting provider. Risk of Complications and/or Morbidity of Patient Management:  Shared medical decision making was utilized in creating the patients health plan today. ED Course as of 07/13/23 1453   Thu Jul 13, 2023   1019 EKG interpretation: Sinus rhythm, right bundle branch block, significant depression in the ST segment noted in V3 only. No reciprocal changes otherwise. Rate of 74 [BR]   1208 Patient's work-up today appears stable. CT imaging shows no acute abnormality, I have no specific explanation for patient's progressive weakness. He voices considerable concern for staying at home by himself as he may fall.   I will reach out to hospitalist regarding potential for admission for observation/rehab placement [BR]      ED Course

## 2023-07-13 NOTE — ED NOTES
Lab called to run CK and Magnesium on second green top sent and new orders placed. Spoke with Rosalee Zhou in lab for confirmation.      Ben Hughes RN  07/13/23 6918

## 2023-07-13 NOTE — CARE COORDINATION
Patient currently In the ED with complaint of weakness and feeling uncoordinated. He lives alone with living quarters on the second floor. He states he has no issues with the 13 stairs to get to the second floor. Patient has previous hx of HH through [facility] for wound care which is resolved. Hx of participating in free standing facility PT. He has a walker at home. He confirms POA and is asked to provide a copy of his legal paperwork for this. Next of kin is a sister in [address] [place] [address] who he reports is not is good health. CM to follow clinical course to determine needs. 07/13/23 1047   Service Assessment   Patient Orientation Alert and Oriented   Cognition Alert   History Provided By Patient   Primary Rena Rodarte is: Named in [address]   PCP Verified by CM Yes   Last Visit to PCP Within last 3 months   Prior Functional Level Independent in ADLs/IADLs   Current Functional Level Independent in ADLs/IADLs   Can patient return to prior living arrangement Unknown at present   Ability to make needs known: Good   Family able to assist with home care needs: No   Would you like for me to discuss the discharge plan with any other family members/significant others, and if so, who?  No   Financial Resources Medicare   Community Resources None   Social/Functional History   Lives With Alone   Type of Home Condo   Home Layout Two level   Home Access Level entry   [address] [employer] [address] in [address], standard   [address] Help From Friend(s)   ADL Assistance Independent   Homemaking Assistance Independent   Homemaking Responsibilities Yes   Ambulation Assistance Independent   Transfer Assistance Independent   Active  Yes   Mode of Transportation Car   Occupation Retired   Discharge Planning   Type of Residence

## 2023-07-13 NOTE — ED TRIAGE NOTES
Pt arrives via EMS from home. Pt was in hospital Saturday for same issue and was diagnosed with UTI and discharged. Pts chief complaint is difficulty ambulating since 2019. Pt reports shuffling when walking and reports the issue getting worse. Pt reports concern for driving and safety due to difficulty with mobility.         /79, HR 86, , T 98.1

## 2023-07-14 ENCOUNTER — HOME HEALTH ADMISSION (OUTPATIENT)
Dept: HOME HEALTH SERVICES | Facility: HOME HEALTH | Age: 78
End: 2023-07-14

## 2023-07-14 VITALS
WEIGHT: 115 LBS | DIASTOLIC BLOOD PRESSURE: 79 MMHG | SYSTOLIC BLOOD PRESSURE: 118 MMHG | OXYGEN SATURATION: 96 % | HEIGHT: 64 IN | RESPIRATION RATE: 17 BRPM | HEART RATE: 87 BPM | BODY MASS INDEX: 19.63 KG/M2 | TEMPERATURE: 97.7 F

## 2023-07-14 LAB
ANION GAP SERPL CALC-SCNC: 2 MMOL/L (ref 2–11)
BASOPHILS # BLD: 0 K/UL (ref 0–0.2)
BASOPHILS NFR BLD: 0 % (ref 0–2)
BUN SERPL-MCNC: 23 MG/DL (ref 8–23)
CALCIUM SERPL-MCNC: 8.7 MG/DL (ref 8.3–10.4)
CHLORIDE SERPL-SCNC: 107 MMOL/L (ref 101–110)
CO2 SERPL-SCNC: 30 MMOL/L (ref 21–32)
CREAT SERPL-MCNC: 1.4 MG/DL (ref 0.8–1.5)
DIFFERENTIAL METHOD BLD: ABNORMAL
EOSINOPHIL # BLD: 0.1 K/UL (ref 0–0.8)
EOSINOPHIL NFR BLD: 1 % (ref 0.5–7.8)
ERYTHROCYTE [DISTWIDTH] IN BLOOD BY AUTOMATED COUNT: 13.5 % (ref 11.9–14.6)
GLUCOSE SERPL-MCNC: 90 MG/DL (ref 65–100)
HCT VFR BLD AUTO: 37.1 % (ref 41.1–50.3)
HGB BLD-MCNC: 11.6 G/DL (ref 13.6–17.2)
IMM GRANULOCYTES # BLD AUTO: 0 K/UL (ref 0–0.5)
IMM GRANULOCYTES NFR BLD AUTO: 0 % (ref 0–5)
LYMPHOCYTES # BLD: 0.5 K/UL (ref 0.5–4.6)
LYMPHOCYTES NFR BLD: 9 % (ref 13–44)
MCH RBC QN AUTO: 29.6 PG (ref 26.1–32.9)
MCHC RBC AUTO-ENTMCNC: 31.3 G/DL (ref 31.4–35)
MCV RBC AUTO: 94.6 FL (ref 82–102)
MONOCYTES # BLD: 0.4 K/UL (ref 0.1–1.3)
MONOCYTES NFR BLD: 7 % (ref 4–12)
NEUTS SEG # BLD: 4.8 K/UL (ref 1.7–8.2)
NEUTS SEG NFR BLD: 83 % (ref 43–78)
NRBC # BLD: 0 K/UL (ref 0–0.2)
PLATELET # BLD AUTO: 157 K/UL (ref 150–450)
PMV BLD AUTO: 8.9 FL (ref 9.4–12.3)
POTASSIUM SERPL-SCNC: 3.9 MMOL/L (ref 3.5–5.1)
RBC # BLD AUTO: 3.92 M/UL (ref 4.23–5.6)
SODIUM SERPL-SCNC: 139 MMOL/L (ref 133–143)
WBC # BLD AUTO: 5.8 K/UL (ref 4.3–11.1)

## 2023-07-14 PROCEDURE — 6360000002 HC RX W HCPCS: Performed by: HOSPITALIST

## 2023-07-14 PROCEDURE — 6360000002 HC RX W HCPCS: Performed by: NURSE PRACTITIONER

## 2023-07-14 PROCEDURE — 2580000003 HC RX 258: Performed by: HOSPITALIST

## 2023-07-14 PROCEDURE — 80048 BASIC METABOLIC PNL TOTAL CA: CPT

## 2023-07-14 PROCEDURE — 96366 THER/PROPH/DIAG IV INF ADDON: CPT

## 2023-07-14 PROCEDURE — 6370000000 HC RX 637 (ALT 250 FOR IP): Performed by: HOSPITALIST

## 2023-07-14 PROCEDURE — 97165 OT EVAL LOW COMPLEX 30 MIN: CPT

## 2023-07-14 PROCEDURE — 97116 GAIT TRAINING THERAPY: CPT

## 2023-07-14 PROCEDURE — 85025 COMPLETE CBC W/AUTO DIFF WBC: CPT

## 2023-07-14 PROCEDURE — 97535 SELF CARE MNGMENT TRAINING: CPT

## 2023-07-14 PROCEDURE — 2580000003 HC RX 258: Performed by: NURSE PRACTITIONER

## 2023-07-14 PROCEDURE — 36415 COLL VENOUS BLD VENIPUNCTURE: CPT

## 2023-07-14 PROCEDURE — 97161 PT EVAL LOW COMPLEX 20 MIN: CPT

## 2023-07-14 PROCEDURE — 96372 THER/PROPH/DIAG INJ SC/IM: CPT

## 2023-07-14 PROCEDURE — G0378 HOSPITAL OBSERVATION PER HR: HCPCS

## 2023-07-14 PROCEDURE — 96361 HYDRATE IV INFUSION ADD-ON: CPT

## 2023-07-14 RX ORDER — AMITRIPTYLINE HYDROCHLORIDE 50 MG/1
50 TABLET, FILM COATED ORAL NIGHTLY
Status: DISCONTINUED | OUTPATIENT
Start: 2023-07-14 | End: 2023-07-14 | Stop reason: HOSPADM

## 2023-07-14 RX ORDER — AMITRIPTYLINE HYDROCHLORIDE 25 MG/1
TABLET, FILM COATED ORAL
Qty: 30 TABLET | Refills: 0 | Status: SHIPPED | OUTPATIENT
Start: 2023-07-14

## 2023-07-14 RX ORDER — LANOLIN ALCOHOL/MO/W.PET/CERES
100 CREAM (GRAM) TOPICAL DAILY
Qty: 30 TABLET | Refills: 0 | Status: SHIPPED | OUTPATIENT
Start: 2023-07-15 | End: 2023-08-14

## 2023-07-14 RX ORDER — HYDROXYZINE HYDROCHLORIDE 25 MG/1
50 TABLET, FILM COATED ORAL
Qty: 60 TABLET | Refills: 0
Start: 2023-07-14 | End: 2023-08-13

## 2023-07-14 RX ORDER — LANOLIN ALCOHOL/MO/W.PET/CERES
400 CREAM (GRAM) TOPICAL 2 TIMES DAILY
Qty: 10 TABLET | Refills: 0 | Status: SHIPPED | OUTPATIENT
Start: 2023-07-14 | End: 2023-07-19

## 2023-07-14 RX ORDER — LANOLIN ALCOHOL/MO/W.PET/CERES
100 CREAM (GRAM) TOPICAL DAILY
Qty: 30 TABLET | Refills: 0 | Status: SHIPPED | OUTPATIENT
Start: 2023-07-20 | End: 2023-07-14 | Stop reason: SDUPTHER

## 2023-07-14 RX ADMIN — MAGNESIUM GLUCONATE 500 MG ORAL TABLET 400 MG: 500 TABLET ORAL at 08:43

## 2023-07-14 RX ADMIN — LEVOTHYROXINE SODIUM 125 MCG: 0.12 TABLET ORAL at 06:09

## 2023-07-14 RX ADMIN — SODIUM CHLORIDE, POTASSIUM CHLORIDE, SODIUM LACTATE AND CALCIUM CHLORIDE: 600; 310; 30; 20 INJECTION, SOLUTION INTRAVENOUS at 08:47

## 2023-07-14 RX ADMIN — THIAMINE HYDROCHLORIDE 500 MG: 100 INJECTION, SOLUTION INTRAMUSCULAR; INTRAVENOUS at 06:07

## 2023-07-14 RX ADMIN — ENOXAPARIN SODIUM 40 MG: 40 INJECTION SUBCUTANEOUS at 08:43

## 2023-07-14 RX ADMIN — SODIUM CHLORIDE, PRESERVATIVE FREE 10 ML: 5 INJECTION INTRAVENOUS at 10:34

## 2023-07-14 RX ADMIN — CARVEDILOL 6.25 MG: 3.12 TABLET, FILM COATED ORAL at 08:43

## 2023-07-14 NOTE — DISCHARGE SUMMARY
Negative 05/10/2022 12:32 PM    BLOODU LARGE 07/13/2023 06:34 PM    UROBILINOGEN 0.2 07/13/2023 06:34 PM    NITRU Positive 07/13/2023 06:34 PM    LEUKOCYTESUR LARGE 07/13/2023 06:34 PM    WBCUA 20-50 07/13/2023 06:34 PM    RBCUA 20-50 07/13/2023 06:34 PM    EPITHUA 0-3 07/13/2023 06:34 PM    BACTERIA 2+ 07/13/2023 06:34 PM    LABCAST 0 07/13/2023 06:34 PM    MUCUS 0 07/13/2023 06:34 PM        Recent Labs     07/13/23  1834 07/08/23  1141   CULTURE No growth after short period of incubation. Further results to follow after overnight incubation. >100,000 COLONIES/mL >2 ORGANISMS - CONTAMINATED SPECIMEN. SUGGEST RECOLLECTION  THREE OR MORE TYPES OF ORGANISMS ARE PRESENT. THIS IS INDICATIVE OF CONTAMINATION DUE TO IMPROPER COLLECTION TECHNIQUE. PLEASE REPEAT COLLECTION UNLESS PATIENT HAS STARTED ANTIBIOTIC TREATMENT. All Labs from Last 24 Hrs:  Recent Results (from the past 24 hour(s))   Urinalysis with Reflex to Culture    Collection Time: 07/13/23  6:34 PM    Specimen: Urine   Result Value Ref Range    Color, UA YELLOW/STRAW      Appearance CLOUDY      Specific [place], UA 1.013 1.001 - 1.023      pH, Urine 5.5 5.0 - 9.0      Protein,  (A) NEG mg/dL    Glucose, UA Negative mg/dL    Ketones, Urine Negative NEG mg/dL    Bilirubin Urine Negative NEG      Blood, Urine LARGE (A) NEG      Urobilinogen, Urine 0.2 0.2 - 1.0 EU/dL    Nitrite, Urine Positive (A) NEG      Leukocyte Esterase, Urine LARGE (A) NEG      WBC, UA 20-50 0 /hpf    RBC, UA 20-50 0 /hpf    BACTERIA, URINE 2+ (H) 0 /hpf    Urine Culture if Indicated URINE CULTURE ORDERED      Epithelial Cells UA 0-3 0 /hpf    Casts 0 0 /lpf    Crystals 0 0 /LPF    Mucus, UA 0 0 /lpf   Culture, Urine    Collection Time: 07/13/23  6:34 PM    Specimen: Urine, clean catch    URINE   Result Value Ref Range    Special Requests NO SPECIAL REQUESTS      Culture        No growth after short period of incubation. Further results to follow after overnight incubation.

## 2023-07-14 NOTE — CARE COORDINATION
Pt is for discharge home today with home health services. Referral called/faxed to [facility] per patient request for follow up home care as ordered. No additional CM orders received or supportive care needs expressed at this time.

## 2023-07-16 LAB
BACTERIA SPEC CULT: ABNORMAL
BACTERIA SPEC CULT: ABNORMAL
SERVICE CMNT-IMP: ABNORMAL

## 2023-07-16 RX ORDER — CIPROFLOXACIN 500 MG/1
500 TABLET, FILM COATED ORAL 2 TIMES DAILY
Qty: 14 TABLET | Refills: 0 | Status: SHIPPED | OUTPATIENT
Start: 2023-07-16 | End: 2023-07-23

## 2023-07-17 ENCOUNTER — CARE COORDINATION (OUTPATIENT)
Dept: CARE COORDINATION | Facility: CLINIC | Age: 78
End: 2023-07-17

## 2023-07-17 LAB
BACTERIA SPEC CULT: ABNORMAL
BACTERIA SPEC CULT: ABNORMAL
SERVICE CMNT-IMP: ABNORMAL

## 2023-07-17 NOTE — CARE COORDINATION
[address] [address] [facility] 250 Care Transitions Initial Follow Up Call    Call within 2 business days of discharge: Yes    Patient Current Location:  Home: [address] [place] [address],[address] [facility]    Care Transition Nurse contacted the patient by telephone to perform post hospital discharge assessment. Verified name and  with patient as identifiers. Provided introduction to self, and explanation of the Care Transition Nurse role. Patient: Anastacio Nolasco Patient : 1945   MRN: [id number]  Reason for Admission: OBS for Weakness  Discharge Date: 23 RARS: Readmission Risk Score: 27      Last Discharge [address] [facility] [facility]       Date Complaint Diagnosis Description Type Department Provider    23 Gait Problem Weakness . .. ED to Hosp-Admission (Discharged) (ADMITTED) [id number] Bridget Norwood MD; Paula Barron. .. Was this an external facility discharge? No Discharge Facility: [facility]    Challenges to be reviewed by the provider   Additional needs identified to be addressed with provider: No  none               Method of communication with provider: none. Care Transition Nurse reviewed discharge instructions with patient who verbalized understanding. The patient was given an opportunity to ask questions and does not have any further questions or concerns at this time. Were discharge instructions available to patient? Yes. Reviewed appropriate site of care based on symptoms and resources available to patient including: PCP  Urgent care clinics. The patient agrees to contact the PCP office for questions related to their healthcare. Advance Care Planning:   Does patient have an Advance Directive: decision maker updated. Medication reconciliation was performed with patient, who verbalizes understanding of administration of home medications.  Medications reviewed, 1111F entered: N/A    Was patient discharged with a pulse oximeter? no    Non-face-to-face services provided:  Confirmed appointment with PCP-

## 2023-07-17 NOTE — PROGRESS NOTES
ACUTE OCCUPATIONAL THERAPY GOALS:   (Developed with and agreed upon by patient and/or caregiver.)  1. Patient will complete lower body bathing and dressing with modified independence and adaptive equipment as needed. 2. Patient will complete grooming tasks standing at the sink with independence. 3. Patient will tolerate 30 minutes of OT treatment with no rest breaks to increase activity tolerance for ADLs. 4. Patient will complete functional transfers with independence. 5. Patient will complete functional mobility for household distances with independence and good safety awareness. Timeframe: 7 visits       OCCUPATIONAL THERAPY Initial Assessment, Daily Note, and AM       OT Visit Days: 1  Acknowledge Orders  Time  OT Charge Capture  Rehab Caseload Tracker      Vini Hay is a 76 y.o. male   PRIMARY DIAGNOSIS: Difficulty in walking  Weakness [R53.1]  Difficulty in walking [R26.2]       Reason for Referral: Generalized Muscle Weakness (M62.81)  Other lack of cordination (R27.8)  Observation: Payor: MEDICARE / Plan: MEDICARE PART A AND B / Product Type: *No Product type* /     ASSESSMENT:     REHAB RECOMMENDATIONS:   Recommendation to date pending progress:  Setting:  Home Health Therapy    Equipment:    None     ASSESSMENT:  Mr. Adrianna Caban presents to the hospital with difficulty walking and weakness. Pt is pleasant and cooperative this am. Pt has no major complaints and reports he feels he is ready to go home. Pt states he has had ongoing issues with his legs and states that walking is \"hard\". Pt was able to don shoes edge of bed but it required some additional time and pt c/o having difficult time reaching down to his feet. Pt educated on elastic shoelaces and safety with LB dressing with pt verbalizing understanding. Pt noted to have wider base of support with B feet and some external rotation, especially with RLE. Pt reports hx of difficulty with clearing R LE at times.  Pt was mostly SBA/CGA for
Addendum:    7/16/23-   Urine cultures positive for Pseudomonas. Antibiotic prescription sent for Ciprofloxacin to pt's pharmacy. Pt unable to be reached by phone. Left voicemail.
[facility] Hospitalist Service  At the heart of better care     Advance Care Planning   Admit Date:  2023  9:28 AM   Name:  Mitchell Nolasco   Age:  76 y.o. Sex:  male  :  1945   MRN:  [id number]   Room:  [place] [address]    Wai Davies has capacity to make his own decisions:   Yes    If pt unable to make decisions, POA/surrogate decision maker:  Pt's friend, Ms Tariq Silva [phone]    Other people present:   None    Patient / surrogate decision-maker directed code status:  Full Code    Other ACP topics discussed, if applicable:   None    Patient or surrogate consented to discussion of the current conditions, workup, management plans, prognosis, and the risk for further deterioration. Time spent: 17 minutes in direct discussion.       Signed:  Ivanna Funk MD
Treatment: 0/10 Vitals        Oxygen      IV    RESTRICTIONS/PRECAUTIONS:                    GROSS EVALUATION: Intact Impaired (Comments):   AROM [x]     PROM []    Strength [x]     Balance [] Sitting - Static: Good  Sitting - Dynamic: Good  Standing - Static: Fair, +  Standing - Dynamic: Fair, +   Posture [] Forward Head  Rounded Shoulders   Sensation []     Coordination []      Tone []     Edema []    Activity Tolerance [] Patient Tolerated treatment well, Patient limited by fatigue    []      COGNITION/  PERCEPTION: Intact Impaired (Comments):   Orientation []     Vision []     Hearing []     Cognition  []       MOBILITY: I Mod I S SBA CGA Min Mod Max Total  NT x2 Comments:   Bed Mobility    Rolling [] [] [] [] [] [] [] [] [] [] []    Supine to Sit [] [] [] [x] [] [] [] [] [] [] []    Scooting [] [] [] [x] [] [] [] [] [] [] []    Sit to Supine [] [] [] [] [] [] [] [] [] [] []    Transfers    Sit to Stand [] [] [] [x] [x] [] [] [] [] [] []    Bed to Chair [] [] [] [] [] [] [] [] [] [] []    Stand to Sit [] [] [] [x] [x] [] [] [] [] [] []     [] [] [] [] [] [] [] [] [] [] []    I=Independent, Mod I=Modified Independent, S=Supervision, SBA=Standby Assistance, CGA=Contact Guard Assistance,   Min=Minimal Assistance, Mod=Moderate Assistance, Max=Maximal Assistance, Total=Total Assistance, NT=Not Tested    GAIT: I Mod I S SBA CGA Min Mod Max Total  NT x2 Comments:   Level of Assistance [] [] [] [x] [x] [] [] [] [] [] []    Distance 500 feet    DME Gait Belt    Gait Quality Decreased zoe  and Decreased step length    Weightbearing Status      Stairs      I=Independent, Mod I=Modified Independent, S=Supervision, SBA=Standby Assistance, CGA=Contact Guard Assistance,   Min=Minimal Assistance, Mod=Moderate Assistance, Max=Maximal Assistance, Total=Total Assistance, NT=Not Tested    PLAN:   FREQUENCY AND DURATION: 2 times/week for duration of hospital stay or until stated goals are met, whichever comes first.    THERAPY

## 2023-07-24 ENCOUNTER — CARE COORDINATION (OUTPATIENT)
Dept: CARE COORDINATION | Facility: CLINIC | Age: 78
End: 2023-07-24

## 2023-07-24 NOTE — CARE COORDINATION
[facility] Care Transitions Follow Up Call    Patient Current Location:  [id number] [place] [address]  CTN attempted outreach to patient for [facility] follow up call. Unable to reach patient. Message left with contact information requesting a return call. Will continue to outreach per protocol if no return call received.       Patient: Nikki Nolasco  Patient : 1945   MRN: [id number]  Reason for Admission: OBS/Weakness  Discharge Date: 23 RARS: Readmission Risk Score: 27      Future Appointments   Date Time Provider [address] [place] [address]   2023  8:45 AM [id number] INJECTION/CATH [id number] G[facility] AMB   2023 10:00 AM TRIM LAB RESOURCE TRIM [facility] AMB   2023 10:00 AM Harry Pereira DO TRIM [facility] AMB   2023  9:45 AM Diana Parmar MD [id number] [facility] AMB       Reginald Gardner RN

## 2023-07-25 ENCOUNTER — CARE COORDINATION (OUTPATIENT)
Dept: CARE COORDINATION | Facility: CLINIC | Age: 78
End: 2023-07-25

## 2023-07-25 NOTE — CARE COORDINATION
[facility] Care Transitions Follow Up Call  CTN attempted outreach to patient for ABDIEL follow up call. Unable to reach patient. Message left with contact information requesting a return call. Will continue to outreach per protocol if no return call received.   Patient Current Location:  [facility]        Patient: Nuno Nolasco  Patient : 1945   MRN: [id number]  Reason for Admission: OBS/Weakness  Discharge Date: 23 RARS: Readmission Risk Score: 27        Follow Up  Future Appointments   Date Time Provider [address]   2023  8:45 AM [id number] INJECTION/CATH [id number] G[facility] AMB   2023 10:00 AM TRIM LAB RESOURCE TRIM [facility] AMB   2023 10:00 AM Bret Egan DO TRIM [facility] AMB   2023  9:45 AM Matt Clay MD [id number] G[facility] AMB         Poonam Garcia RN

## 2023-08-01 ENCOUNTER — CARE COORDINATION (OUTPATIENT)
Dept: CARE COORDINATION | Facility: CLINIC | Age: 78
End: 2023-08-01

## 2023-08-01 NOTE — CARE COORDINATION
[facility] Care Transitions Follow Up Call  CTN attempted outreach to patient for ABDIEL follow up call. Unable to reach patient. Message left with contact information requesting a return call. Episode resolved due to no return call after multiple attempts.   Patient Current Location:  [facility]        Patient: Tina Nolasco  Patient : 1945   MRN: [id number]  Reason for Admission: OBS/Weakness  Discharge Date: 23 RARS: Readmission Risk Score: 27          Follow Up  Future Appointments   Date Time Provider [address] [place] [address]   2023  8:45 AM [id number] INJECTION/CATH [id number] [facility] AMB   2023 10:00 AM TRIM LAB RESOURCE TRIM [facility] AMB   2023 10:00 AM Asim Bolivar DO TRIM [facility] AMB   2023  9:45 AM Jeferson Gan MD [id number] G[facility] AMB     Amari Frias RN

## 2023-08-03 ENCOUNTER — HOSPITAL ENCOUNTER (EMERGENCY)
Age: 78
Discharge: HOME | End: 2023-08-03
Attending: STUDENT IN AN ORGANIZED HEALTH CARE EDUCATION/TRAINING PROGRAM
Payer: MEDICARE

## 2023-08-03 VITALS
RESPIRATION RATE: 16 BRPM | SYSTOLIC BLOOD PRESSURE: 165 MMHG | BODY MASS INDEX: 23.56 KG/M2 | TEMPERATURE: 98.1 F | HEART RATE: 81 BPM | OXYGEN SATURATION: 98 % | HEIGHT: 60 IN | WEIGHT: 120 LBS | DIASTOLIC BLOOD PRESSURE: 88 MMHG

## 2023-08-03 DIAGNOSIS — R29.898 LEG WEAKNESS, BILATERAL: ICD-10-CM

## 2023-08-03 DIAGNOSIS — Z13.9 ENCOUNTER FOR HEALTH-RELATED SCREENING: Primary | ICD-10-CM

## 2023-08-03 DIAGNOSIS — N30.91 CYSTITIS WITH HEMATURIA: ICD-10-CM

## 2023-08-03 LAB
ANION GAP SERPL CALC-SCNC: 3 MMOL/L (ref 2–11)
APPEARANCE UR: ABNORMAL
BACTERIA URNS QL MICRO: ABNORMAL /HPF
BASOPHILS # BLD: 0 K/UL (ref 0–0.2)
BASOPHILS NFR BLD: 1 % (ref 0–2)
BILIRUB UR QL: NEGATIVE
BUN SERPL-MCNC: 20 MG/DL (ref 8–23)
CALCIUM SERPL-MCNC: 9.3 MG/DL (ref 8.3–10.4)
CHLORIDE SERPL-SCNC: 101 MMOL/L (ref 101–110)
CO2 SERPL-SCNC: 29 MMOL/L (ref 21–32)
COLOR UR: ABNORMAL
CREAT SERPL-MCNC: 1.5 MG/DL (ref 0.8–1.5)
DIFFERENTIAL METHOD BLD: ABNORMAL
EOSINOPHIL # BLD: 0.1 K/UL (ref 0–0.8)
EOSINOPHIL NFR BLD: 1 % (ref 0.5–7.8)
EPI CELLS #/AREA URNS HPF: ABNORMAL /HPF
ERYTHROCYTE [DISTWIDTH] IN BLOOD BY AUTOMATED COUNT: 13.1 % (ref 11.9–14.6)
GLUCOSE SERPL-MCNC: 109 MG/DL (ref 65–100)
GLUCOSE UR STRIP.AUTO-MCNC: NEGATIVE MG/DL
HCT VFR BLD AUTO: 43.6 % (ref 41.1–50.3)
HGB BLD-MCNC: 13.7 G/DL (ref 13.6–17.2)
HGB UR QL STRIP: ABNORMAL
IMM GRANULOCYTES # BLD AUTO: 0 K/UL (ref 0–0.5)
IMM GRANULOCYTES NFR BLD AUTO: 0 % (ref 0–5)
KETONES UR QL STRIP.AUTO: NEGATIVE MG/DL
LEUKOCYTE ESTERASE UR QL STRIP.AUTO: ABNORMAL
LYMPHOCYTES # BLD: 0.5 K/UL (ref 0.5–4.6)
LYMPHOCYTES NFR BLD: 10 % (ref 13–44)
MAGNESIUM SERPL-MCNC: 2 MG/DL (ref 1.8–2.4)
MCH RBC QN AUTO: 29.3 PG (ref 26.1–32.9)
MCHC RBC AUTO-ENTMCNC: 31.4 G/DL (ref 31.4–35)
MCV RBC AUTO: 93.2 FL (ref 82–102)
MONOCYTES # BLD: 0.5 K/UL (ref 0.1–1.3)
MONOCYTES NFR BLD: 10 % (ref 4–12)
NEUTS SEG # BLD: 3.9 K/UL (ref 1.7–8.2)
NEUTS SEG NFR BLD: 78 % (ref 43–78)
NITRITE UR QL STRIP.AUTO: NEGATIVE
NRBC # BLD: 0 K/UL (ref 0–0.2)
OTHER OBSERVATIONS: ABNORMAL
PH UR STRIP: 6 (ref 5–9)
PLATELET # BLD AUTO: 217 K/UL (ref 150–450)
PMV BLD AUTO: 8.9 FL (ref 9.4–12.3)
POTASSIUM SERPL-SCNC: 4.5 MMOL/L (ref 3.5–5.1)
PROT UR STRIP-MCNC: 300 MG/DL
RBC # BLD AUTO: 4.68 M/UL (ref 4.23–5.6)
RBC #/AREA URNS HPF: ABNORMAL /HPF
SODIUM SERPL-SCNC: 133 MMOL/L (ref 133–143)
SP GR UR REFRACTOMETRY: 1.01 (ref 1–1.02)
UROBILINOGEN UR QL STRIP.AUTO: 0.2 EU/DL (ref 0.2–1)
WBC # BLD AUTO: 5 K/UL (ref 4.3–11.1)
WBC URNS QL MICRO: >100 /HPF

## 2023-08-03 PROCEDURE — 87186 SC STD MICRODIL/AGAR DIL: CPT

## 2023-08-03 PROCEDURE — 87088 URINE BACTERIA CULTURE: CPT

## 2023-08-03 PROCEDURE — 87086 URINE CULTURE/COLONY COUNT: CPT

## 2023-08-03 PROCEDURE — 85025 COMPLETE CBC W/AUTO DIFF WBC: CPT

## 2023-08-03 PROCEDURE — 99283 EMERGENCY DEPT VISIT LOW MDM: CPT

## 2023-08-03 PROCEDURE — 80048 BASIC METABOLIC PNL TOTAL CA: CPT

## 2023-08-03 PROCEDURE — 83735 ASSAY OF MAGNESIUM: CPT

## 2023-08-03 PROCEDURE — 81001 URINALYSIS AUTO W/SCOPE: CPT

## 2023-08-03 RX ORDER — CEFPODOXIME PROXETIL 100 MG/1
100 TABLET, FILM COATED ORAL 2 TIMES DAILY
Qty: 20 TABLET | Refills: 0 | Status: SHIPPED | OUTPATIENT
Start: 2023-08-03 | End: 2023-08-13

## 2023-08-03 ASSESSMENT — PAIN - FUNCTIONAL ASSESSMENT: PAIN_FUNCTIONAL_ASSESSMENT: NONE - DENIES PAIN

## 2023-08-03 NOTE — DISCHARGE INSTRUCTIONS
There were no concerning findings on your blood work today. Follow-up with your primary care provider at the next scheduled appointment. If you have any worsening symptoms return the emergency department.

## 2023-08-03 NOTE — ED TRIAGE NOTES
Pt arrives stating he is having trouble walking and has been for years. Pt states he has been experiencing lightheadedness  and states the lightheadedness has been going on for a while.

## 2023-08-03 NOTE — ED PROVIDER NOTES
Emergency Department Provider Note       PCP: Silvia Jennings DO   Age: 76 y.o. Sex: male     DISPOSITION Decision To Discharge 08/03/2023 12:42:07 PM       ICD-10-CM    1. Encounter for health-related screening  Z13.9       2. Cystitis with hematuria  N30.91       3. Leg weakness, bilateral  R29.898           Medical Decision Making     Complexity of Problems Addressed:  1 chronic problem    Data Reviewed and Analyzed:  Category 1:   I independently ordered and reviewed each unique test.         Category 2:     I interpreted the labs. Category 3: Discussion of management or test interpretation. Presented o ED with complaint of chronic 4-year history of bilateral leg weakness. States \"something is not quite right\" but I have received report that the patient was ambulatory to EMS and he is ambulatory in the ED today with no obvious gait deficiencies. Denies headaches, vision changes, back pain, or leg pain. This is not a new complaint. No headaches, dizziness, vision changes, back pain, or leg pain. I do not believe it is necessary to pursue any imaging today. We will check basic blood work. 4+ bacteriuria. Otherwise lab results demonstrate no acute changes from baseline. Will treat an outpatient setting for UTI with p.o. Vantin x10 days. Will obtain urine culture. Given that patient has been experiencing the symptoms of bilateral leg weakness for the last 4 years and he has had surgical removal of a muscle in his right leg years ago which is likely contributing to the symptoms, I do not believe it is necessary to pursue any x-ray or CT imaging given the chronicity of these problems with no acute changes or new symptoms. Advised close follow-up with PCP. Risk of Complications and/or Morbidity of Patient Management:  Prescription drug management performed. Patient was discharged risks and benefits of hospitalization were considered.   Shared medical decision making was utilized in creating

## 2023-08-06 LAB
BACTERIA SPEC CULT: ABNORMAL
BACTERIA SPEC CULT: ABNORMAL
SERVICE CMNT-IMP: ABNORMAL

## 2023-08-06 RX ORDER — AMPICILLIN 500 MG/1
500 CAPSULE ORAL 2 TIMES DAILY
Qty: 20 CAPSULE | Refills: 0 | Status: SHIPPED | OUTPATIENT
Start: 2023-08-06 | End: 2023-08-16

## 2023-08-07 ENCOUNTER — CARE COORDINATION (OUTPATIENT)
Dept: CARE COORDINATION | Facility: CLINIC | Age: 78
End: 2023-08-07

## 2023-08-07 ENCOUNTER — OFFICE VISIT (OUTPATIENT)
Dept: INTERNAL MEDICINE CLINIC | Facility: CLINIC | Age: 78
End: 2023-08-07
Payer: MEDICARE

## 2023-08-07 VITALS
HEIGHT: 60 IN | OXYGEN SATURATION: 100 % | HEART RATE: 87 BPM | BODY MASS INDEX: 21.75 KG/M2 | SYSTOLIC BLOOD PRESSURE: 108 MMHG | WEIGHT: 110.8 LBS | DIASTOLIC BLOOD PRESSURE: 72 MMHG

## 2023-08-07 DIAGNOSIS — R53.1 WEAKNESS: ICD-10-CM

## 2023-08-07 DIAGNOSIS — R26.89 OTHER ABNORMALITIES OF GAIT AND MOBILITY: ICD-10-CM

## 2023-08-07 DIAGNOSIS — Z91.81 AT HIGH RISK FOR FALLS: Primary | ICD-10-CM

## 2023-08-07 PROCEDURE — G8420 CALC BMI NORM PARAMETERS: HCPCS | Performed by: NURSE PRACTITIONER

## 2023-08-07 PROCEDURE — 1036F TOBACCO NON-USER: CPT | Performed by: NURSE PRACTITIONER

## 2023-08-07 PROCEDURE — G8427 DOCREV CUR MEDS BY ELIG CLIN: HCPCS | Performed by: NURSE PRACTITIONER

## 2023-08-07 PROCEDURE — 99213 OFFICE O/P EST LOW 20 MIN: CPT | Performed by: NURSE PRACTITIONER

## 2023-08-07 PROCEDURE — 1123F ACP DISCUSS/DSCN MKR DOCD: CPT | Performed by: NURSE PRACTITIONER

## 2023-08-07 NOTE — CARE COORDINATION
ACM received call back from pt, discussed ED ABDIEL for lightheadedness, weakness. Pt seen by PCP this am, with referral to OPPT. Discussed upcoming appt w/ PCP for labs and appt in 2-3wks. Pt denies CCM needs at this time.

## 2023-08-07 NOTE — PROGRESS NOTES
Juju Nolasco (:  1945) is a 76 y.o. male,Established patient, here for evaluation of the following chief complaint(s):  Follow-up (8/3 trouble walking for a long time been to rehab x4 times ER did nothing he said. /)         ASSESSMENT/PLAN:  1. At high risk for falls  -     [facility]  2. Weakness  -     [facility]  3. Other abnormalities of gait and mobility  -     [facility]      No follow-ups on file. Reviewed ER note and lab and imaging  CT head w/o acute finding  Walked in the hallway, no shuffling to gait but wide gait  Has had prior leg surgery  Discussed his overall weakness and posture, would recommend PT  Try [facility] Ratliff   Continue working on increase calories for his weight maintenance  F/u with PCP    Subjective   SUBJECTIVE/OBJECTIVE:  Patient is here for ED follow up/   He had c/o difficulty walking. He was treated for  a UTI. He feels his difficulty walking started a long time ago. He went to rehab and it improved some but then it started up again. He has had prior leg surgery with removal of muscle in right thigh  When he first starts walking he shuffles but then it improves. He doesn't want to do PT again. Review of Systems   All other systems reviewed and are negative. Objective   Physical Exam  Vitals reviewed. Constitutional:       Appearance: Normal appearance. He is not ill-appearing. HENT:      Head: Normocephalic. Right Ear: External ear normal.      Left Ear: External ear normal.   Eyes:      Pupils: Pupils are equal, round, and reactive to light. Cardiovascular:      Rate and Rhythm: Normal rate and regular rhythm. Pulmonary:      Effort: Pulmonary effort is normal.      Breath sounds: No wheezing or rhonchi. Musculoskeletal:      Cervical back: Neck supple. Neurological:      General: No focal deficit present.

## 2023-08-13 ENCOUNTER — HOSPITAL ENCOUNTER (EMERGENCY)
Age: 78
Discharge: HOME | End: 2023-08-13
Attending: EMERGENCY MEDICINE
Payer: MEDICARE

## 2023-08-13 VITALS
DIASTOLIC BLOOD PRESSURE: 89 MMHG | TEMPERATURE: 97.5 F | OXYGEN SATURATION: 99 % | HEIGHT: 60 IN | HEART RATE: 84 BPM | BODY MASS INDEX: 21.6 KG/M2 | RESPIRATION RATE: 16 BRPM | WEIGHT: 110 LBS | SYSTOLIC BLOOD PRESSURE: 131 MMHG

## 2023-08-13 DIAGNOSIS — T83.9XXA PROBLEM WITH FOLEY CATHETER, INITIAL ENCOUNTER (HCC): Primary | ICD-10-CM

## 2023-08-13 PROBLEM — R90.89 MAGNETIC RESONANCE IMAGING OF BRAIN ABNORMAL: Status: ACTIVE | Noted: 2022-12-13

## 2023-08-13 PROBLEM — R29.898 WEAKNESS OF BOTH LOWER EXTREMITIES: Status: ACTIVE | Noted: 2022-10-25

## 2023-08-13 PROBLEM — K56.609 SMALL BOWEL OBSTRUCTION (HCC): Status: ACTIVE | Noted: 2022-11-22

## 2023-08-13 PROBLEM — T85.79XA: Status: ACTIVE | Noted: 2017-03-30

## 2023-08-13 PROBLEM — F33.41 RECURRENT MAJOR DEPRESSIVE DISORDER, IN PARTIAL REMISSION (HCC): Status: ACTIVE | Noted: 2022-10-25

## 2023-08-13 PROBLEM — E03.9 ACQUIRED HYPOTHYROIDISM: Status: ACTIVE | Noted: 2019-05-03

## 2023-08-13 PROBLEM — D62 ACUTE POSTHEMORRHAGIC ANEMIA: Status: ACTIVE | Noted: 2019-05-22

## 2023-08-13 PROBLEM — R53.81 DEBILITY: Status: ACTIVE | Noted: 2022-10-25

## 2023-08-13 PROBLEM — M62.81 MUSCLE WEAKNESS (GENERALIZED): Status: ACTIVE | Noted: 2019-06-19

## 2023-08-13 PROBLEM — Z93.3 PRESENCE OF COLOSTOMY (HCC): Status: ACTIVE | Noted: 2022-10-25

## 2023-08-13 PROBLEM — I10 PRIMARY HYPERTENSION: Status: ACTIVE | Noted: 2022-10-25

## 2023-08-13 PROBLEM — R13.12 DYSPHAGIA, OROPHARYNGEAL PHASE: Status: ACTIVE | Noted: 2019-06-21

## 2023-08-13 PROBLEM — F41.9 ANXIETY DISORDER, UNSPECIFIED: Status: ACTIVE | Noted: 2019-05-03

## 2023-08-13 PROBLEM — R26.81 UNSTEADINESS ON FEET: Status: ACTIVE | Noted: 2019-05-04

## 2023-08-13 PROBLEM — F51.01 PRIMARY INSOMNIA: Status: ACTIVE | Noted: 2022-10-25

## 2023-08-13 PROBLEM — M19.90 UNSPECIFIED OSTEOARTHRITIS, UNSPECIFIED SITE: Status: ACTIVE | Noted: 2019-05-03

## 2023-08-13 PROBLEM — I82.90 ACUTE EMBOLISM AND THROMBOSIS OF UNSPECIFIED VEIN: Status: ACTIVE | Noted: 2019-05-22

## 2023-08-13 PROBLEM — K56.609 UNSPECIFIED INTESTINAL OBSTRUCTION, UNSPECIFIED AS TO PARTIAL VERSUS COMPLETE OBSTRUCTION (HCC): Status: ACTIVE | Noted: 2019-05-03

## 2023-08-13 PROBLEM — F31.9 BIPOLAR DISORDER, UNSPECIFIED (HCC): Status: ACTIVE | Noted: 2019-05-22

## 2023-08-13 PROBLEM — F33.9 MAJOR DEPRESSIVE DISORDER, RECURRENT, UNSPECIFIED (HCC): Status: ACTIVE | Noted: 2019-05-03

## 2023-08-13 PROCEDURE — 51702 INSERT TEMP BLADDER CATH: CPT

## 2023-08-13 PROCEDURE — 99282 EMERGENCY DEPT VISIT SF MDM: CPT

## 2023-08-13 ASSESSMENT — PAIN - FUNCTIONAL ASSESSMENT: PAIN_FUNCTIONAL_ASSESSMENT: NONE - DENIES PAIN

## 2023-08-13 NOTE — ED TRIAGE NOTES
Patient a 75 y/o Male reports to the ED with complaint of his urinary catheter leaking. States the catheter was placed a month ago and has not yet seen urology. States his catheter is starting to leak. And would like it replaced.

## 2023-08-13 NOTE — DISCHARGE INSTRUCTIONS
You were evaluated today for John catheter changing. We were able to change out your John catheter today your physical exam is overall reassuring. Please contact your urologist office tomorrow to let them know that your catheter was changed today.     Please return to the emergency department chest pain, shortness of breath, signs and symptoms of stroke, penile swelling, testicular swelling, nondraining catheter, general worsening of your condition

## 2023-08-13 NOTE — ED PROVIDER NOTES
9/16/2021    IR NEPHROSTOMY PERCUTANEOUS LEFT  9/16/2021    IR NEPHROSTOMY PERCUTANEOUS LEFT 9/16/2021 [facility]    IR REPLCE T CVC WO PORT SAME ACC  5/30/2019    IR TUBE CHANGE  8/5/2021    IR TUBE CHANGE  7/8/2021    IR TUBE CHANGE  6/14/2021    IR TUBE CHANGE  5/27/2021    IR TUNNELED CATHETER PLACEMENT GREATER THAN 5 YEARS  5/20/2019    IR TUNNELED CATHETER PLACEMENT GREATER THAN 5 YEARS  5/20/2019    IR TUNNELED CATHETER PLACEMENT GREATER THAN 5 YEARS 5/20/2019 [facility]    OTHER SURGICAL HISTORY Bilateral     cataracts  2017    OTHER SURGICAL HISTORY  10/7/2013    Roberto---endorectal us    POLYPECTOMY      TOTAL COLECTOMY  11/2013    Roberto--lap LAR with coloproctostomy, mobilization splenic flexure, diverting loop ileostomy    TOTAL COLECTOMY Right 8/2014    for leak    VASCULAR SURGERY Left 4/14    single lumen port/subsequently removed        No results found for any visits on 08/13/23. No orders to display         Voice dictation software was used during the making of this note. This software is not perfect and grammatical and other typographical errors may be present. This note has not been completely proofread for errors.       Davis [place]  08/13/23 1423

## 2023-08-14 ENCOUNTER — TELEPHONE (OUTPATIENT)
Dept: UROLOGY | Age: 78
End: 2023-08-14

## 2023-08-14 NOTE — TELEPHONE ENCOUNTER
Mr. Caryn Briscoe called into the office this morning. He originally had an appointment at 8:45 for a cath change. He stated he went yesterday to the ER and had it changed. He is requesting a 4 week appointment, morning if possible. When looking at schedule, it was all grayed out the week of September 11th. I was told to send a message to the back to see when he can be put on the schedule. Please call patient at [phone] with a day and time. Thank you.

## 2023-08-17 ENCOUNTER — APPOINTMENT (OUTPATIENT)
Dept: GENERAL RADIOLOGY | Age: 78
End: 2023-08-17
Payer: MEDICARE

## 2023-08-17 ENCOUNTER — HOSPITAL ENCOUNTER (EMERGENCY)
Age: 78
Discharge: HOME | End: 2023-08-17
Attending: EMERGENCY MEDICINE
Payer: MEDICARE

## 2023-08-17 VITALS
WEIGHT: 110 LBS | HEIGHT: 68 IN | SYSTOLIC BLOOD PRESSURE: 161 MMHG | DIASTOLIC BLOOD PRESSURE: 95 MMHG | OXYGEN SATURATION: 99 % | BODY MASS INDEX: 16.67 KG/M2 | TEMPERATURE: 97.5 F | HEART RATE: 70 BPM | RESPIRATION RATE: 16 BRPM

## 2023-08-17 DIAGNOSIS — R29.898 WEAKNESS OF BOTH LEGS: Primary | ICD-10-CM

## 2023-08-17 LAB
ALBUMIN SERPL-MCNC: 3.2 G/DL (ref 3.2–4.6)
ALBUMIN/GLOB SERPL: 0.9 (ref 0.4–1.6)
ALP SERPL-CCNC: 54 U/L (ref 50–136)
ALT SERPL-CCNC: 25 U/L (ref 12–65)
ANION GAP SERPL CALC-SCNC: 7 MMOL/L (ref 2–11)
AST SERPL-CCNC: 26 U/L (ref 15–37)
BASOPHILS # BLD: 0 K/UL (ref 0–0.2)
BASOPHILS NFR BLD: 1 % (ref 0–2)
BILIRUB SERPL-MCNC: 0.4 MG/DL (ref 0.2–1.1)
BUN SERPL-MCNC: 25 MG/DL (ref 8–23)
CALCIUM SERPL-MCNC: 8.8 MG/DL (ref 8.3–10.4)
CHLORIDE SERPL-SCNC: 102 MMOL/L (ref 101–110)
CO2 SERPL-SCNC: 28 MMOL/L (ref 21–32)
CREAT SERPL-MCNC: 1.5 MG/DL (ref 0.8–1.5)
DIFFERENTIAL METHOD BLD: ABNORMAL
EOSINOPHIL # BLD: 0.1 K/UL (ref 0–0.8)
EOSINOPHIL NFR BLD: 1 % (ref 0.5–7.8)
ERYTHROCYTE [DISTWIDTH] IN BLOOD BY AUTOMATED COUNT: 13.1 % (ref 11.9–14.6)
GLOBULIN SER CALC-MCNC: 3.5 G/DL (ref 2.8–4.5)
GLUCOSE SERPL-MCNC: 92 MG/DL (ref 65–100)
HCT VFR BLD AUTO: 39 % (ref 41.1–50.3)
HGB BLD-MCNC: 12.6 G/DL (ref 13.6–17.2)
IMM GRANULOCYTES # BLD AUTO: 0 K/UL (ref 0–0.5)
IMM GRANULOCYTES NFR BLD AUTO: 1 % (ref 0–5)
LYMPHOCYTES # BLD: 0.5 K/UL (ref 0.5–4.6)
LYMPHOCYTES NFR BLD: 12 % (ref 13–44)
MAGNESIUM SERPL-MCNC: 2.1 MG/DL (ref 1.8–2.4)
MCH RBC QN AUTO: 30.1 PG (ref 26.1–32.9)
MCHC RBC AUTO-ENTMCNC: 32.3 G/DL (ref 31.4–35)
MCV RBC AUTO: 93.3 FL (ref 82–102)
MONOCYTES # BLD: 0.3 K/UL (ref 0.1–1.3)
MONOCYTES NFR BLD: 8 % (ref 4–12)
NEUTS SEG # BLD: 3.2 K/UL (ref 1.7–8.2)
NEUTS SEG NFR BLD: 77 % (ref 43–78)
NRBC # BLD: 0 K/UL (ref 0–0.2)
PLATELET # BLD AUTO: 171 K/UL (ref 150–450)
PMV BLD AUTO: 10.3 FL (ref 9.4–12.3)
POTASSIUM SERPL-SCNC: 4.2 MMOL/L (ref 3.5–5.1)
PROT SERPL-MCNC: 6.7 G/DL (ref 6.3–8.2)
RBC # BLD AUTO: 4.18 M/UL (ref 4.23–5.6)
SODIUM SERPL-SCNC: 137 MMOL/L (ref 133–143)
WBC # BLD AUTO: 4.2 K/UL (ref 4.3–11.1)

## 2023-08-17 PROCEDURE — 80053 COMPREHEN METABOLIC PANEL: CPT

## 2023-08-17 PROCEDURE — 85025 COMPLETE CBC W/AUTO DIFF WBC: CPT

## 2023-08-17 PROCEDURE — 72100 X-RAY EXAM L-S SPINE 2/3 VWS: CPT

## 2023-08-17 PROCEDURE — 2580000003 HC RX 258: Performed by: EMERGENCY MEDICINE

## 2023-08-17 PROCEDURE — 83735 ASSAY OF MAGNESIUM: CPT

## 2023-08-17 PROCEDURE — 99284 EMERGENCY DEPT VISIT MOD MDM: CPT

## 2023-08-17 RX ORDER — TRAMADOL HYDROCHLORIDE 50 MG/1
50 TABLET ORAL EVERY 8 HOURS PRN
Qty: 12 TABLET | Refills: 0 | Status: SHIPPED | OUTPATIENT
Start: 2023-08-17 | End: 2023-08-21

## 2023-08-17 RX ORDER — 0.9 % SODIUM CHLORIDE 0.9 %
1000 INTRAVENOUS SOLUTION INTRAVENOUS ONCE
Status: COMPLETED | OUTPATIENT
Start: 2023-08-17 | End: 2023-08-17

## 2023-08-17 RX ADMIN — SODIUM CHLORIDE 1000 ML: 9 INJECTION, SOLUTION INTRAVENOUS at 12:22

## 2023-08-17 ASSESSMENT — PAIN - FUNCTIONAL ASSESSMENT
PAIN_FUNCTIONAL_ASSESSMENT: 0-10
PAIN_FUNCTIONAL_ASSESSMENT: 0-10

## 2023-08-17 ASSESSMENT — ENCOUNTER SYMPTOMS
BACK PAIN: 1
GASTROINTESTINAL NEGATIVE: 1
RESPIRATORY NEGATIVE: 1

## 2023-08-17 ASSESSMENT — PAIN SCALES - GENERAL
PAINLEVEL_OUTOF10: 0
PAINLEVEL_OUTOF10: 0

## 2023-08-17 NOTE — ED PROVIDER NOTES
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    surgery and radiation    Psychiatric disorder     anxiety    Thyroid disease         Past Surgical History:   Procedure Laterality Date    COLONOSCOPY  last 2/3/15    Roberto--no polyps--3 year recall    COLONOSCOPY N/A 3/5/2021    COLONOSCOPY/BMI 19 performed by Pippa Bee MD at [address] [place] [address] N/A 2/12/2018    COLONOSCOPY / BMI=21 performed by Pippa Bee MD at [facility]  2/12/2018         COLONOSCOPY THRU STOMA,LESN RMVL W/SNARE  3/5/2021         COLOSTOMY  5/11/16    CT RETROPERITONEAL PERC DRAIN  5/13/2021    CT RETROPERITONEAL PERC DRAIN 5/13/2021 [facility] CT SC[facility]    CT RETROPERITONEAL PERC DRAIN  5/20/2021    CT RETROPERITONEAL PERC DRAIN 5/20/2021 [facility] CT SCAN    CYSTOSCOPY Right 12/9/2022    CYSTOSCOPY, RIGHT RETROGRADE PYELOGRAM, RIGHT URETERAL STENT EXCHANGE performed by Jeffery Lorenz MD at [address] [place] [address] Right 6/29/2023    CYSTOSCOPY RETROGRADE PYELOGRAM performed by Jeffery Lorenz MD at [address] [place] [address] Right 6/29/2023    CYSTOSCOPY URETERAL STENT EXCHANGE, OLSON CATHETER EXCHANGE performed by Jeffery Lorenz MD at [address] [place] [address]    GI  4/2016    Sacral nerve stimlator lead trial (unsucessful) and removal    HERNIA REPAIR      and Colostomy in May    HERNIA REPAIR  3/2015, 5/2016    IR ABSCESS EVAL THRU EXISTING CATH  11/24/2021    IR ABSCESS EVAL THRU EXISTING CATH

## 2023-08-17 NOTE — CARE COORDINATION
Patient has increasing weakness and was discharged in July with [facility]. Chart records indicate he didn't respond to this service. He states he is willing to accept services from [facility]. Services are re-ordered as patient is stating he will answer phone calls and follow through. 08/17/23 8663   Service Assessment   Patient Orientation Alert and Oriented   Cognition Alert   History Provided By Patient   Primary [address] Family Members   Patient's Healthcare Decision Maker is: Legal Next of [address] [facility]   PCP Verified by CM Yes   Last Visit to PCP Within last 6 months   Prior Functional Level Independent in ADLs/IADLs   Current Functional Level Independent in ADLs/IADLs   Can patient return to prior living arrangement Yes   Ability to make needs known: Good   Family able to assist with home care needs: No   Would you like for me to discuss the discharge plan with any other family members/significant others, and if so, who? No   Financial Resources ERPLY Resources None   Social/Functional History   Lives With Alone   Home Layout Two level   Bathroom Equipment Grab bars in [address], standard  (Has a walker but states he does not use it. Advised to use walker as he is reporting feeling weak.)   ADL Assistance Independent   Homemaking Assistance Independent   Homemaking Responsibilities Yes   Ambulation Assistance Independent   Transfer Assistance Independent   Discharge Planning   Type of [address] Alone   Current Services Prior To Admission None   Potential Assistance Needed Outpatient PT/OT; Home Care   Potential Assistance Purchasing Medications No   Type of Home Care Services PT;OT;Nursing Services   Patient expects to be discharged to: House   One/Two Story Residence Two story   # of Interior Steps 15   Services At/After Discharge   Transition of Care Consult (CM Consult) Home Health   Condition of Participation: Discharge Planning

## 2023-08-17 NOTE — ED NOTES
Pt requesting to be unhooked from IV, notified that only 600ml finished and was put on pump to finish.  Pt req removal.      Abbi Tello RN  08/17/23 0432

## 2023-08-17 NOTE — DISCHARGE INSTRUCTIONS
Take anti-inflammatory as needed for back pain. Follow-up with orthopedics. Follow-up with physical therapy. Return for any emergency.

## 2023-08-17 NOTE — ACP (ADVANCE CARE PLANNING)
Advance Care Planning   Healthcare Decision Maker:    Primary Decision Maker: Sowmya Nelly Weinstein/Nephew - [phone]    Click here to complete Healthcare Decision Makers including selection of the Healthcare Decision Maker Relationship (ie \"Primary\").

## 2023-08-20 ENCOUNTER — HOSPITAL ENCOUNTER (EMERGENCY)
Age: 78
Discharge: HOME | End: 2023-08-20
Attending: EMERGENCY MEDICINE
Payer: MEDICARE

## 2023-08-20 VITALS
TEMPERATURE: 97.7 F | HEART RATE: 84 BPM | RESPIRATION RATE: 15 BRPM | OXYGEN SATURATION: 98 % | DIASTOLIC BLOOD PRESSURE: 92 MMHG | SYSTOLIC BLOOD PRESSURE: 136 MMHG

## 2023-08-20 DIAGNOSIS — T83.9XXA PROBLEM WITH FOLEY CATHETER, INITIAL ENCOUNTER (HCC): Primary | ICD-10-CM

## 2023-08-20 PROCEDURE — 99282 EMERGENCY DEPT VISIT SF MDM: CPT

## 2023-08-20 NOTE — ED TRIAGE NOTES
Pt arrives to ER with c/o urinary catheter leak issue. Pt states last night urine began to come out of his penis around the catheter.

## 2023-08-20 NOTE — DISCHARGE INSTRUCTIONS
Continue current carr care, keep any upcomming appt with your urologist , return to closest er for any worsening symptoms

## 2023-08-20 NOTE — ED NOTES
Urine is draining fine into the bag. Patient have no trauma to penile or scrotum area.       Everett Monahan RN  08/20/23 9134

## 2023-08-20 NOTE — ED PROVIDER NOTES
HERNIA REPAIR  3/2015, 2016    IR ABSCESS EVAL THRU EXISTING CATH  2021    IR ABSCESS EVAL THRU EXISTING CATH  2021    IR ABSCESS EVAL THRU EXISTING CATH  2021    IR NEPHROSTOGRAM EXISTING ACCESS  12/10/2021    IR NEPHROSTOGRAM EXISTING ACCESS  10/4/2021    IR NEPHROSTOMY EXCHANGE CATHETER  2021    IR NEPHROSTOMY EXCHANGE CATHETER  11/3/2021    IR NEPHROSTOMY EXCHANGE CATHETER  10/28/2021    IR NEPHROSTOMY PERCUTANEOUS LEFT  2021    IR NEPHROSTOMY PERCUTANEOUS LEFT  2021    IR NEPHROSTOMY PERCUTANEOUS LEFT 2021 [facility]    IR REPLCE T CVC WO PORT SAME ACC  2019    IR TUBE CHANGE  2021    IR TUBE CHANGE  2021    IR TUBE CHANGE  2021    IR TUBE CHANGE  2021    IR TUNNELED CATHETER PLACEMENT GREATER THAN 5 YEARS  2019    IR TUNNELED CATHETER PLACEMENT GREATER THAN 5 YEARS  2019    IR TUNNELED CATHETER PLACEMENT GREATER THAN 5 YEARS 2019 [facility]    OTHER SURGICAL HISTORY Bilateral     cataracts  2017    OTHER SURGICAL HISTORY  10/7/2013    Roberto---endorectal us    POLYPECTOMY      TOTAL COLECTOMY  2013    Roberto--lap LAR with coloproctostomy, mobilization splenic flexure, diverting loop ileostomy    TOTAL COLECTOMY Right 2014    for leak    VASCULAR SURGERY Left     single lumen port/subsequently removed        Social History     Socioeconomic History    Marital status: Single   Tobacco Use    Smoking status: Former     Packs/day: 1.00     Years: 10.00     Pack years: 10.00     Types: Cigarettes     Quit date: 1963     Years since quittin.8     Passive exposure: Current    Smokeless tobacco: Never   Substance and Sexual Activity    Alcohol use: Not Currently     Alcohol/week: 11.7 standard drinks    Drug use: No     Social Determinants of Health     Financial Resource Strain: Low Risk     Difficulty of Paying Living Expenses: Not hard at all   Food Insecurity: No Food Insecurity    Worried

## 2023-08-21 ENCOUNTER — OFFICE VISIT (OUTPATIENT)
Dept: ORTHOPEDIC SURGERY | Age: 78
End: 2023-08-21
Payer: MEDICARE

## 2023-08-21 ENCOUNTER — TELEPHONE (OUTPATIENT)
Dept: ORTHOPEDIC SURGERY | Age: 78
End: 2023-08-21

## 2023-08-21 DIAGNOSIS — M43.16 SPONDYLOLISTHESIS OF LUMBAR REGION: ICD-10-CM

## 2023-08-21 DIAGNOSIS — M41.9 SCOLIOSIS OF THORACOLUMBAR SPINE, UNSPECIFIED SCOLIOSIS TYPE: Primary | ICD-10-CM

## 2023-08-21 PROCEDURE — 1036F TOBACCO NON-USER: CPT | Performed by: PHYSICIAN ASSISTANT

## 2023-08-21 PROCEDURE — 1123F ACP DISCUSS/DSCN MKR DOCD: CPT | Performed by: PHYSICIAN ASSISTANT

## 2023-08-21 PROCEDURE — 99204 OFFICE O/P NEW MOD 45 MIN: CPT | Performed by: PHYSICIAN ASSISTANT

## 2023-08-21 PROCEDURE — G8419 CALC BMI OUT NRM PARAM NOF/U: HCPCS | Performed by: PHYSICIAN ASSISTANT

## 2023-08-21 PROCEDURE — G8427 DOCREV CUR MEDS BY ELIG CLIN: HCPCS | Performed by: PHYSICIAN ASSISTANT

## 2023-08-21 RX ORDER — OXYCODONE HYDROCHLORIDE AND ACETAMINOPHEN 5; 325 MG/1; MG/1
TABLET ORAL
COMMUNITY
Start: 2023-08-01

## 2023-08-21 RX ORDER — SODIUM FLUORIDE1.1%, POTASSIUM NITRATE 5% 5.8; 57.5 MG/ML; MG/ML
GEL, DENTIFRICE DENTAL
COMMUNITY
Start: 2023-07-12

## 2023-08-21 RX ORDER — QUETIAPINE FUMARATE 25 MG/1
25 TABLET, FILM COATED ORAL 2 TIMES DAILY PRN
COMMUNITY
Start: 2023-07-21

## 2023-08-21 NOTE — TELEPHONE ENCOUNTER
He saw Tisha this morning. She asked if he wanted pain meds and he told her no. He says he has changed his mind. He said he has reasons for that and if she wants to call him he will explain.

## 2023-08-21 NOTE — PROGRESS NOTES
Name: Serenity Morgan  YOB: 1945  Gender: male  MRN: [id number]    CC: Back Pain (Low back pain with bilateral leg weakness)       HPI: This is a 76y.o. year old male who has a complicated medical history of rectal cancer involving multiple surgeries and complications. He has a colostomy and a carr catheter. He has major depressive disorder. He is here today for evaluation of his lower back pain and bilateral leg weakness. He states that he has a sharp pain in the left side of his lower back when he gets up he feels that the muscle is very hard in the lower back and it is painful getting up and down. The also complains of inability to walk. I have reviewed his his records and it appears that he has had trouble with walking for approximately 3 to 4 years. The etiology of this is not been fully diagnosed. He is recently been in [facility] for physical therapy. He denies numbness or tingling and there is really no pain in his legs but the legs just feel weak and has a hard time walking. History was obtained by patient    This patient  has not had lumbar surgery in the past.     Prior treatment: PT    Pertinent medical history: History of rectal cancer, DVT, colostomy, indwelling urethral catheter, hydronephrosis kidney, major depressive disorder         AMB PAIN ASSESSMENT 8/21/2023   Location of Pain Back   Location Modifiers Right;Left   Severity of Pain 5   Quality of Pain Aching; Marnee Krystal; Other (Comment)   Duration of Pain Persistent   Frequency of Pain Constant   Date Pain First Started 5/14/2019   Aggravating Factors Walking;Standing; Other (Comment)   Limiting Behavior Yes   Result of Injury No   Work-Related Injury No   Are there other pain locations you wish to document? No            ROS/Meds/PSH/PMH/FH/SH: I personally reviewed the patient's collected intake data.   Below are the pertinents:    No Known Allergies      Current Outpatient Medications:

## 2023-08-22 ENCOUNTER — TELEPHONE (OUTPATIENT)
Dept: ORTHOPEDIC SURGERY | Age: 78
End: 2023-08-22

## 2023-08-22 ENCOUNTER — CARE COORDINATION (OUTPATIENT)
Dept: CARE COORDINATION | Facility: CLINIC | Age: 78
End: 2023-08-22

## 2023-08-22 NOTE — CARE COORDINATION
ACM spoke w/ pt regarding ED ABDIEL. Pt reports catheter no longer leaking, denies concerns at this time. Discussed upcoming appts. Discussed CCM services, role of ACM. Pt declines services at this time.

## 2023-08-22 NOTE — TELEPHONE ENCOUNTER
He is returning Jan's call. Please try him again. PER MOM CALLING TO CHECK THE STATUS OF GETTING A CALL BACK FROM YESTERDAY / PLS ADV

## 2023-08-25 ENCOUNTER — HOSPITAL ENCOUNTER (EMERGENCY)
Age: 78
Discharge: HOME | End: 2023-08-25
Attending: EMERGENCY MEDICINE
Payer: MEDICARE

## 2023-08-25 ENCOUNTER — NURSE ONLY (OUTPATIENT)
Dept: INTERNAL MEDICINE CLINIC | Facility: CLINIC | Age: 78
End: 2023-08-25

## 2023-08-25 VITALS
TEMPERATURE: 98.4 F | OXYGEN SATURATION: 94 % | BODY MASS INDEX: 16.73 KG/M2 | WEIGHT: 110 LBS | HEART RATE: 74 BPM | SYSTOLIC BLOOD PRESSURE: 114 MMHG | RESPIRATION RATE: 16 BRPM | DIASTOLIC BLOOD PRESSURE: 70 MMHG

## 2023-08-25 DIAGNOSIS — R63.0 APPETITE LOSS: ICD-10-CM

## 2023-08-25 DIAGNOSIS — R33.9 URINARY RETENTION: Primary | ICD-10-CM

## 2023-08-25 DIAGNOSIS — N30.00 ACUTE CYSTITIS WITHOUT HEMATURIA: Primary | ICD-10-CM

## 2023-08-25 DIAGNOSIS — F33.1 MODERATE EPISODE OF RECURRENT MAJOR DEPRESSIVE DISORDER (HCC): ICD-10-CM

## 2023-08-25 DIAGNOSIS — E55.9 VITAMIN D INSUFFICIENCY: ICD-10-CM

## 2023-08-25 DIAGNOSIS — F32.A DEPRESSION, UNSPECIFIED DEPRESSION TYPE: ICD-10-CM

## 2023-08-25 DIAGNOSIS — D64.9 ANEMIA, UNSPECIFIED TYPE: ICD-10-CM

## 2023-08-25 LAB
ALBUMIN SERPL-MCNC: 3.1 G/DL (ref 3.2–4.6)
ALBUMIN SERPL-MCNC: 3.7 G/DL (ref 3.2–4.6)
ALBUMIN/GLOB SERPL: 1.1 (ref 0.4–1.6)
ALBUMIN/GLOB SERPL: 1.2 (ref 0.4–1.6)
ALP SERPL-CCNC: 54 U/L (ref 50–136)
ALP SERPL-CCNC: 63 U/L (ref 50–136)
ALT SERPL-CCNC: 26 U/L (ref 12–65)
ALT SERPL-CCNC: 26 U/L (ref 12–65)
ANION GAP SERPL CALC-SCNC: 3 MMOL/L (ref 2–11)
ANION GAP SERPL CALC-SCNC: 6 MMOL/L (ref 2–11)
APPEARANCE UR: ABNORMAL
AST SERPL-CCNC: 26 U/L (ref 15–37)
AST SERPL-CCNC: 29 U/L (ref 15–37)
BACTERIA URNS QL MICRO: ABNORMAL /HPF
BASOPHILS # BLD: 0 K/UL (ref 0–0.2)
BASOPHILS NFR BLD: 1 % (ref 0–2)
BILIRUB SERPL-MCNC: 0.3 MG/DL (ref 0.2–1.1)
BILIRUB SERPL-MCNC: 0.3 MG/DL (ref 0.2–1.1)
BILIRUB UR QL: NEGATIVE
BUN SERPL-MCNC: 27 MG/DL (ref 8–23)
BUN SERPL-MCNC: 33 MG/DL (ref 8–23)
CALCIUM SERPL-MCNC: 8.4 MG/DL (ref 8.3–10.4)
CALCIUM SERPL-MCNC: 9.1 MG/DL (ref 8.3–10.4)
CASTS URNS QL MICRO: ABNORMAL /LPF
CHLORIDE SERPL-SCNC: 104 MMOL/L (ref 101–110)
CHLORIDE SERPL-SCNC: 106 MMOL/L (ref 101–110)
CO2 SERPL-SCNC: 30 MMOL/L (ref 21–32)
CO2 SERPL-SCNC: 32 MMOL/L (ref 21–32)
COLOR UR: ABNORMAL
CREAT SERPL-MCNC: 1.5 MG/DL (ref 0.8–1.5)
CREAT SERPL-MCNC: 1.8 MG/DL (ref 0.8–1.5)
DIFFERENTIAL METHOD BLD: ABNORMAL
EKG ATRIAL RATE: 79 BPM
EKG DIAGNOSIS: NORMAL
EKG P AXIS: 66 DEGREES
EKG P-R INTERVAL: 187 MS
EKG Q-T INTERVAL: 387 MS
EKG QRS DURATION: 138 MS
EKG QTC CALCULATION (BAZETT): 444 MS
EKG R AXIS: 206 DEGREES
EKG T AXIS: 52 DEGREES
EKG VENTRICULAR RATE: 79 BPM
EOSINOPHIL # BLD: 0 K/UL (ref 0–0.8)
EOSINOPHIL NFR BLD: 1 % (ref 0.5–7.8)
EPI CELLS #/AREA URNS HPF: ABNORMAL /HPF
ERYTHROCYTE [DISTWIDTH] IN BLOOD BY AUTOMATED COUNT: 13 % (ref 11.9–14.6)
ERYTHROCYTE [DISTWIDTH] IN BLOOD BY AUTOMATED COUNT: 13.1 % (ref 11.9–14.6)
FERRITIN SERPL-MCNC: 83 NG/ML (ref 8–388)
GLOBULIN SER CALC-MCNC: 2.9 G/DL (ref 2.8–4.5)
GLOBULIN SER CALC-MCNC: 3 G/DL (ref 2.8–4.5)
GLUCOSE SERPL-MCNC: 101 MG/DL (ref 65–100)
GLUCOSE SERPL-MCNC: 115 MG/DL (ref 65–100)
GLUCOSE UR STRIP.AUTO-MCNC: NEGATIVE MG/DL
HCT VFR BLD AUTO: 35.2 % (ref 41.1–50.3)
HCT VFR BLD AUTO: 42.4 % (ref 41.1–50.3)
HGB BLD-MCNC: 11.4 G/DL (ref 13.6–17.2)
HGB BLD-MCNC: 13.4 G/DL (ref 13.6–17.2)
HGB UR QL STRIP: ABNORMAL
IMM GRANULOCYTES # BLD AUTO: 0 K/UL (ref 0–0.5)
IMM GRANULOCYTES NFR BLD AUTO: 0 % (ref 0–5)
IRON SERPL-MCNC: 77 UG/DL (ref 35–150)
KETONES UR QL STRIP.AUTO: NEGATIVE MG/DL
LACTATE SERPL-SCNC: 2.2 MMOL/L (ref 0.4–2)
LEUKOCYTE ESTERASE UR QL STRIP.AUTO: ABNORMAL
LYMPHOCYTES # BLD: 0.4 K/UL (ref 0.5–4.6)
LYMPHOCYTES NFR BLD: 7 % (ref 13–44)
MCH RBC QN AUTO: 29.3 PG (ref 26.1–32.9)
MCH RBC QN AUTO: 29.8 PG (ref 26.1–32.9)
MCHC RBC AUTO-ENTMCNC: 31.6 G/DL (ref 31.4–35)
MCHC RBC AUTO-ENTMCNC: 32.4 G/DL (ref 31.4–35)
MCV RBC AUTO: 91.9 FL (ref 82–102)
MCV RBC AUTO: 92.6 FL (ref 82–102)
MONOCYTES # BLD: 0.6 K/UL (ref 0.1–1.3)
MONOCYTES NFR BLD: 11 % (ref 4–12)
MUCOUS THREADS URNS QL MICRO: 0 /LPF
NEUTS SEG # BLD: 4.3 K/UL (ref 1.7–8.2)
NEUTS SEG NFR BLD: 80 % (ref 43–78)
NITRITE UR QL STRIP.AUTO: POSITIVE
NRBC # BLD: 0 K/UL (ref 0–0.2)
NRBC # BLD: 0 K/UL (ref 0–0.2)
PH UR STRIP: 5.5 (ref 5–9)
PLATELET # BLD AUTO: 157 K/UL (ref 150–450)
PLATELET # BLD AUTO: 191 K/UL (ref 150–450)
PMV BLD AUTO: 8.7 FL (ref 9.4–12.3)
PMV BLD AUTO: 9.8 FL (ref 9.4–12.3)
POTASSIUM SERPL-SCNC: 4 MMOL/L (ref 3.5–5.1)
POTASSIUM SERPL-SCNC: 4 MMOL/L (ref 3.5–5.1)
PROCALCITONIN SERPL-MCNC: 0.07 NG/ML (ref 0–0.49)
PROT SERPL-MCNC: 6 G/DL (ref 6.3–8.2)
PROT SERPL-MCNC: 6.7 G/DL (ref 6.3–8.2)
PROT UR STRIP-MCNC: 30 MG/DL
RBC # BLD AUTO: 3.83 M/UL (ref 4.23–5.6)
RBC # BLD AUTO: 4.58 M/UL (ref 4.23–5.6)
RBC #/AREA URNS HPF: ABNORMAL /HPF
SODIUM SERPL-SCNC: 140 MMOL/L (ref 133–143)
SODIUM SERPL-SCNC: 141 MMOL/L (ref 133–143)
SP GR UR REFRACTOMETRY: 1.02 (ref 1–1.02)
TIBC SERPL-MCNC: 330 UG/DL (ref 250–450)
TSH, 3RD GENERATION: 2.99 UIU/ML (ref 0.36–3.74)
URINE CULTURE IF INDICATED: ABNORMAL
UROBILINOGEN UR QL STRIP.AUTO: 0.2 EU/DL (ref 0.2–1)
WBC # BLD AUTO: 4.8 K/UL (ref 4.3–11.1)
WBC # BLD AUTO: 5.3 K/UL (ref 4.3–11.1)
WBC URNS QL MICRO: >100 /HPF

## 2023-08-25 PROCEDURE — 93005 ELECTROCARDIOGRAM TRACING: CPT | Performed by: EMERGENCY MEDICINE

## 2023-08-25 PROCEDURE — 87040 BLOOD CULTURE FOR BACTERIA: CPT

## 2023-08-25 PROCEDURE — 80053 COMPREHEN METABOLIC PANEL: CPT

## 2023-08-25 PROCEDURE — 96366 THER/PROPH/DIAG IV INF ADDON: CPT

## 2023-08-25 PROCEDURE — 85025 COMPLETE CBC W/AUTO DIFF WBC: CPT

## 2023-08-25 PROCEDURE — 87186 SC STD MICRODIL/AGAR DIL: CPT

## 2023-08-25 PROCEDURE — 87086 URINE CULTURE/COLONY COUNT: CPT

## 2023-08-25 PROCEDURE — 6360000002 HC RX W HCPCS: Performed by: EMERGENCY MEDICINE

## 2023-08-25 PROCEDURE — 99284 EMERGENCY DEPT VISIT MOD MDM: CPT

## 2023-08-25 PROCEDURE — 2580000003 HC RX 258: Performed by: EMERGENCY MEDICINE

## 2023-08-25 PROCEDURE — 81001 URINALYSIS AUTO W/SCOPE: CPT

## 2023-08-25 PROCEDURE — 83605 ASSAY OF LACTIC ACID: CPT

## 2023-08-25 PROCEDURE — 84145 PROCALCITONIN (PCT): CPT

## 2023-08-25 PROCEDURE — 87088 URINE BACTERIA CULTURE: CPT

## 2023-08-25 PROCEDURE — 96365 THER/PROPH/DIAG IV INF INIT: CPT

## 2023-08-25 RX ORDER — CEFPODOXIME PROXETIL 100 MG/1
100 TABLET, FILM COATED ORAL 2 TIMES DAILY
Qty: 20 TABLET | Refills: 0 | Status: SHIPPED | OUTPATIENT
Start: 2023-08-25 | End: 2023-09-04

## 2023-08-25 RX ORDER — SODIUM CHLORIDE, SODIUM LACTATE, POTASSIUM CHLORIDE, AND CALCIUM CHLORIDE .6; .31; .03; .02 G/100ML; G/100ML; G/100ML; G/100ML
1000 INJECTION, SOLUTION INTRAVENOUS
Status: COMPLETED | OUTPATIENT
Start: 2023-08-25 | End: 2023-08-25

## 2023-08-25 RX ORDER — HYOSCYAMINE SULFATE 0.12 MG/1
1 TABLET SUBLINGUAL EVERY 4 HOURS PRN
Qty: 60 EACH | Refills: 2 | Status: SHIPPED | OUTPATIENT
Start: 2023-08-25

## 2023-08-25 RX ADMIN — SODIUM CHLORIDE, POTASSIUM CHLORIDE, SODIUM LACTATE AND CALCIUM CHLORIDE 1000 ML: 600; 310; 30; 20 INJECTION, SOLUTION INTRAVENOUS at 16:19

## 2023-08-25 RX ADMIN — CEFEPIME 2000 MG: 2 INJECTION, POWDER, FOR SOLUTION INTRAVENOUS at 17:04

## 2023-08-25 ASSESSMENT — PAIN - FUNCTIONAL ASSESSMENT: PAIN_FUNCTIONAL_ASSESSMENT: NONE - DENIES PAIN

## 2023-08-25 NOTE — ED TRIAGE NOTES
Per EMS: patient is continuing to decline with mobility. catheterAlert and oriented. Bp 112/70, 75HR, 97% RA, 247bgl.

## 2023-08-25 NOTE — ED PROVIDER NOTES
Procedures    Orders Placed This Encounter   Procedures    Blood Culture 1    Blood Culture 2    Culture, Urine    Comprehensive Metabolic Panel    CBC with Auto Differential    Lactate, Sepsis    Procalcitonin    Urinalysis with Reflex to Culture    Neurologic Status Assessment    Strict intake and output    Vital Signs Per Unit Routine    EKG 12 Lead    Saline lock IV    Insert/Charge John Catheter - Simple        Medications given during this emergency department visit:  Medications   lactated ringers bolus bolus 1,000 mL (1,000 mLs IntraVENous New Bag 8/25/23 1619)   ceFEPIme (MAXIPIME) 2,000 mg in sodium chloride 0.9 % 100 mL IVPB (mini-bag) (2,000 mg IntraVENous New Bag 8/25/23 1704)       New Prescriptions    CEFPODOXIME (VANTIN) 100 MG TABLET    Take 1 tablet by mouth 2 times daily for 10 days        Past Medical History:   Diagnosis Date    Acute deep vein thrombosis (DVT) of non-extremity vein 5/20/2019    Anemia     Cancer (HCC)     Family history of malignant neoplasm of gastrointestinal tract     mother colon/ovarian cancer 67    Fecal incontinence     LAR syndrome    John catheter in place 2022    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    surgery and radiation    Psychiatric disorder     anxiety    Thyroid disease         Past Surgical History:   Procedure Laterality Date    COLONOSCOPY  last 2/3/15    Roberto--no polyps--3 year recall    COLONOSCOPY N/A 3/5/2021    COLONOSCOPY/BMI 19 performed by

## 2023-08-25 NOTE — ED NOTES
Patient is ambulatory. Patient walked more than 50 steps. Patient has a stable gait. Patient reports of wanting to go home rather than being admitted. Patient is provided with an education related to antibiotic. Informed to take it with food and to drink more water. Patient called his friend to come pick him up. Patient refused ambulance to take him home.       Sudha Perez RN  08/25/23 2281

## 2023-08-26 LAB
25(OH)D3 SERPL-MCNC: 21 NG/ML (ref 30–100)
IRON SATN MFR SERPL: 23 %
IRON SERPL-MCNC: 76 UG/DL (ref 35–150)
TIBC SERPL-MCNC: 336 UG/DL (ref 250–450)

## 2023-08-27 ENCOUNTER — HOSPITAL ENCOUNTER (EMERGENCY)
Age: 78
Discharge: HOME | DRG: 698 | End: 2023-08-27
Attending: EMERGENCY MEDICINE
Payer: MEDICARE

## 2023-08-27 ENCOUNTER — HOME HEALTH ADMISSION (OUTPATIENT)
Dept: HOME HEALTH SERVICES | Facility: HOME HEALTH | Age: 78
End: 2023-08-27

## 2023-08-27 VITALS
DIASTOLIC BLOOD PRESSURE: 97 MMHG | HEART RATE: 95 BPM | SYSTOLIC BLOOD PRESSURE: 173 MMHG | TEMPERATURE: 97.7 F | OXYGEN SATURATION: 100 %

## 2023-08-27 DIAGNOSIS — N30.00 ACUTE CYSTITIS WITHOUT HEMATURIA: ICD-10-CM

## 2023-08-27 DIAGNOSIS — R53.1 GENERALIZED WEAKNESS: Primary | ICD-10-CM

## 2023-08-27 LAB
ALBUMIN SERPL-MCNC: 3.4 G/DL (ref 3.2–4.6)
ALBUMIN/GLOB SERPL: 1.2 (ref 0.4–1.6)
ALP SERPL-CCNC: 56 U/L (ref 50–136)
ALT SERPL-CCNC: 23 U/L (ref 12–65)
ANION GAP SERPL CALC-SCNC: 3 MMOL/L (ref 2–11)
AST SERPL-CCNC: 26 U/L (ref 15–37)
BACTERIA SPEC CULT: ABNORMAL
BACTERIA SPEC CULT: NORMAL
BASOPHILS # BLD: 0 K/UL (ref 0–0.2)
BASOPHILS NFR BLD: 1 % (ref 0–2)
BILIRUB SERPL-MCNC: 0.4 MG/DL (ref 0.2–1.1)
BUN SERPL-MCNC: 20 MG/DL (ref 8–23)
CALCIUM SERPL-MCNC: 8.7 MG/DL (ref 8.3–10.4)
CHLORIDE SERPL-SCNC: 101 MMOL/L (ref 101–110)
CO2 SERPL-SCNC: 32 MMOL/L (ref 21–32)
CREAT SERPL-MCNC: 1.4 MG/DL (ref 0.8–1.5)
DIFFERENTIAL METHOD BLD: ABNORMAL
EOSINOPHIL # BLD: 0 K/UL (ref 0–0.8)
EOSINOPHIL NFR BLD: 1 % (ref 0.5–7.8)
ERYTHROCYTE [DISTWIDTH] IN BLOOD BY AUTOMATED COUNT: 13 % (ref 11.9–14.6)
GLOBULIN SER CALC-MCNC: 2.8 G/DL (ref 2.8–4.5)
GLUCOSE SERPL-MCNC: 95 MG/DL (ref 65–100)
HCT VFR BLD AUTO: 37.4 % (ref 41.1–50.3)
HGB BLD-MCNC: 12.1 G/DL (ref 13.6–17.2)
IMM GRANULOCYTES # BLD AUTO: 0 K/UL (ref 0–0.5)
IMM GRANULOCYTES NFR BLD AUTO: 0 % (ref 0–5)
LYMPHOCYTES # BLD: 0.6 K/UL (ref 0.5–4.6)
LYMPHOCYTES NFR BLD: 13 % (ref 13–44)
MCH RBC QN AUTO: 29.4 PG (ref 26.1–32.9)
MCHC RBC AUTO-ENTMCNC: 32.4 G/DL (ref 31.4–35)
MCV RBC AUTO: 90.8 FL (ref 82–102)
MONOCYTES # BLD: 0.5 K/UL (ref 0.1–1.3)
MONOCYTES NFR BLD: 10 % (ref 4–12)
NEUTS SEG # BLD: 3.5 K/UL (ref 1.7–8.2)
NEUTS SEG NFR BLD: 75 % (ref 43–78)
NRBC # BLD: 0 K/UL (ref 0–0.2)
PLATELET # BLD AUTO: 174 K/UL (ref 150–450)
PMV BLD AUTO: 8.9 FL (ref 9.4–12.3)
POTASSIUM SERPL-SCNC: 4.6 MMOL/L (ref 3.5–5.1)
PROT SERPL-MCNC: 6.2 G/DL (ref 6.3–8.2)
RBC # BLD AUTO: 4.12 M/UL (ref 4.23–5.6)
SERVICE CMNT-IMP: ABNORMAL
SERVICE CMNT-IMP: NORMAL
SODIUM SERPL-SCNC: 136 MMOL/L (ref 133–143)
WBC # BLD AUTO: 4.7 K/UL (ref 4.3–11.1)

## 2023-08-27 PROCEDURE — 85025 COMPLETE CBC W/AUTO DIFF WBC: CPT

## 2023-08-27 PROCEDURE — 6360000002 HC RX W HCPCS

## 2023-08-27 PROCEDURE — 99284 EMERGENCY DEPT VISIT MOD MDM: CPT

## 2023-08-27 PROCEDURE — 2580000003 HC RX 258

## 2023-08-27 PROCEDURE — 96374 THER/PROPH/DIAG INJ IV PUSH: CPT

## 2023-08-27 PROCEDURE — 80053 COMPREHEN METABOLIC PANEL: CPT

## 2023-08-27 RX ORDER — SODIUM CHLORIDE, SODIUM LACTATE, POTASSIUM CHLORIDE, AND CALCIUM CHLORIDE .6; .31; .03; .02 G/100ML; G/100ML; G/100ML; G/100ML
1000 INJECTION, SOLUTION INTRAVENOUS ONCE
Status: COMPLETED | OUTPATIENT
Start: 2023-08-27 | End: 2023-08-27

## 2023-08-27 RX ADMIN — SODIUM CHLORIDE, POTASSIUM CHLORIDE, SODIUM LACTATE AND CALCIUM CHLORIDE 1000 ML: 600; 310; 30; 20 INJECTION, SOLUTION INTRAVENOUS at 12:24

## 2023-08-27 RX ADMIN — CEFTRIAXONE 1000 MG: 1 INJECTION, POWDER, FOR SOLUTION INTRAMUSCULAR; INTRAVENOUS at 12:24

## 2023-08-27 ASSESSMENT — ENCOUNTER SYMPTOMS
VOMITING: 0
DIARRHEA: 0
NAUSEA: 0
COLOR CHANGE: 0
CHEST TIGHTNESS: 0
SHORTNESS OF BREATH: 0
WHEEZING: 0
ABDOMINAL PAIN: 0

## 2023-08-27 NOTE — CARE COORDINATION
CM received a call form Dr. Kiersten Roe concerning [facility]) for patient. CM made contact with patient via phone and discussed [facility] and patient states that he has a [facility] referral made when discharge from hospital las month. States that he wasn't able to answer the phone in time for [facility] to come out to see. Patient was agreeable for a [facility] referral to be made to [facility] again. Referral completed. CM made contact with [facility] and made her aware of the referral..  CM will continue to follow and remain available for any needs, concerns or questions that may arise.

## 2023-08-27 NOTE — CONSULTS
[facility] Hospitalist Consult   Admit Date:  2023 11:43 AM   Name:  Homero Hodgkin Comin   Age:  76 y.o. Sex:  male  :  1945   MRN:  [id number]   Room:  ER08/08    Presenting/Chief Complaint: Extremity Weakness    Reason(s) for Admission: No admission diagnoses are documented for this encounter. Hospitalists consulted by Lella Fabry, MD for: bilatereal leg weakness    History of Presenting Illness:   Ivan Smith is a 76 y.o. male with history of chronic carr and recurrent UTIs who presented with leg weakness. Bilateral, started last week. No numbness, tingling, or other focal neurologic symptoms. No confusion. Similar to previous times he has had UTI. Was seen in ER on  with similar symptoms. UA was suggestive of UTI and he was given vantin rx for 10 days. Carr was changed in the ER. He has been compliant. He comes back today with weakness unchanged. He says he can walk at home it is just more difficult. He ambulated into ER. He has a walker at home but has not used it in some time. Lab workup and vitals in ER today unremarkable. He did get a dose of rocephin in ER and some IVF. Hospitalists consulted for admission for possible rehab eval.  Pt denies other complaints. No CP, SOB, fevers, abd pain. Assessment & Plan:     Bilateral leg weakness  -similar to prior UTIs. At one point in past he also had neurology consult and extensive neuro workup for this which was negative; see prior notes if wanting details. Suspect weakness just related to his illness and will improve with time. He does not meet inpatient criteria and his insurance (straight medicare) will not pay for him to go to rehab unless he has a diagnosis that qualifies for three night inpatient stay. If he gets admitted his only option will be home health, so I see no reason to admit at this time as it will not change the management.       Past History:     Past Medical History:   Diagnosis Date 0.0 - 5.0 %    Neutrophils Absolute 3.5 1.7 - 8.2 K/UL    Lymphocytes Absolute 0.6 0.5 - 4.6 K/UL    Monocytes Absolute 0.5 0.1 - 1.3 K/UL    Eosinophils Absolute 0.0 0.0 - 0.8 K/UL    Basophils Absolute 0.0 0.0 - 0.2 K/UL    Absolute Immature Granulocyte 0.0 0.0 - 0.5 K/UL   CMP    Collection Time: 08/27/23 11:54 AM   Result Value Ref Range    Sodium 136 133 - 143 mmol/L    Potassium 4.6 3.5 - 5.1 mmol/L    Chloride 101 101 - 110 mmol/L    CO2 32 21 - 32 mmol/L    Anion Gap 3 2 - 11 mmol/L    Glucose 95 65 - 100 mg/dL    BUN 20 8 - 23 MG/DL    Creatinine 1.40 0.8 - 1.5 MG/DL    Est, Glom Filt Rate 51 (L) >60 ml/min/1.73m2    Calcium 8.7 8.3 - 10.4 MG/DL    Total Bilirubin 0.4 0.2 - 1.1 MG/DL    ALT 23 12 - 65 U/L    AST 26 15 - 37 U/L    Alk Phosphatase 56 50 - 136 U/L    Total Protein 6.2 (L) 6.3 - 8.2 g/dL    Albumin 3.4 3.2 - 4.6 g/dL    Globulin 2.8 2.8 - 4.5 g/dL    Albumin/Globulin Ratio 1.2 0.4 - 1.6         I have personally reviewed imaging studies showing:  No results found. Echocardiogram:  11/14/22    TRANSTHORACIC ECHOCARDIOGRAM (TTE) COMPLETE (CONTRAST/BUBBLE/3D PRN) 11/16/2022  3:31 PM, 11/16/2022 12:00 AM (Final)    Interpretation Summary    Left Ventricle: Normal left ventricular systolic function with a visually estimated EF of 60 - 65%. Left ventricle size is normal. Normal wall thickness. Normal wall motion. Normal diastolic function. Tricuspid Valve: Trace regurgitation. Normal RVSP. The estimated RVSP is 27 mmHg. Left Atrium: Left atrium is mildly dilated. Pericardium: No pericardial effusion. Sinus rhythm between 70 to 75 bpm noted during the study. Signed by: Damion Forbes MD on 11/16/2022  3:31 PM, Signed by: Unknown Provider Result on 11/16/2022 12:00 AM        Signed:  Mcihele Sofia MD    Part of this note may have been written by using a voice dictation software.   The note has been proof read but may still contain some grammatical/other typographical

## 2023-08-27 NOTE — ED TRIAGE NOTES
[facility]- patient called ems r/t \" legs dont work\". Patient walked for EMS outside without difficulty. Patient was recently here for weakness and was discharged. patient would like to be admitted. Alert and oriented. No pain.

## 2023-08-27 NOTE — DISCHARGE INSTRUCTIONS
Continue antibiotics as prescribed. Plan to follow-up with your primary care doctor. Please return with any worsening symptoms or concerns.

## 2023-08-27 NOTE — ED PROVIDER NOTES
Emergency Department Provider Note       PCP: Asim Bolivar DO   Age: 76 y.o. Sex: male     DISPOSITION Decision To Discharge 08/27/2023 01:27:16 PM       ICD-10-CM    1. Generalized weakness  R53.1       2. Acute cystitis without hematuria  N30.00           Medical Decision Making     Complexity of Problems Addressed:  1 stable acute illness    Data Reviewed and Analyzed:  I independently ordered and reviewed each unique test.             Discussion of management or test interpretation. 12-year-old male with history of chronic indwelling John presenting with generalized weakness. Seen in ED yesterday and found to have UTI therefore was prescribed Vantin which she has been compliant with. There was discussion of admission which patient ultimately declined at that visit. He presents today again with worsening symptoms requesting admission. Repeat labs are unremarkable. I discussed case with hospitalist who did independently evaluate patient. Patient is believed not to meet inpatient criteria therefore will plan to discharge home to continue antimicrobial regimen as before. Patient understanding and agreeable. Risk of Complications and/or Morbidity of Patient Management:  Patient was discharged risks and benefits of hospitalization were considered. History      Edward Gan is a 76 y.o. male who presents to the Emergency Department with chief complaint of    Chief Complaint   Patient presents with    Extremity Weakness      Patient is a 12-year-old male with history of colorectal cancer s/p colostomy, chronic indwelling John presenting to the emergency department for evaluation of generalized weakness. Patient states that he was recently seen in our department yesterday and was found to have a UTI related to his indwelling John. States that it was changed yesterday and he was prescribed antibiotics.   Patient states that he was offered admission due to his weakness which he ultimately 2021    IR TUBE CHANGE  2021    IR TUNNELED CATHETER PLACEMENT GREATER THAN 5 YEARS  2019    IR TUNNELED CATHETER PLACEMENT GREATER THAN 5 YEARS  2019    IR TUNNELED CATHETER PLACEMENT GREATER THAN 5 YEARS 2019 [facility] SPECIALS    OTHER SURGICAL HISTORY Bilateral     cataracts      OTHER SURGICAL HISTORY  10/7/2013    Roberto---endorectal us    POLYPECTOMY      TOTAL COLECTOMY  2013    Roberto--lap LAR with coloproctostomy, mobilization splenic flexure, diverting loop ileostomy    TOTAL COLECTOMY Right 2014    for leak    VASCULAR SURGERY Left     single lumen port/subsequently removed        Social History     Socioeconomic History    Marital status: Single   Tobacco Use    Smoking status: Former     Packs/day: 1.00     Years: 10.00     Pack years: 10.00     Types: Cigarettes     Quit date: 1963     Years since quittin.8     Passive exposure: Current    Smokeless tobacco: Never   Substance and Sexual Activity    Alcohol use: Not Currently     Alcohol/week: 11.7 standard drinks    Drug use: No     Social Determinants of Health     Financial Resource Strain: Low Risk     Difficulty of Paying Living Expenses: Not hard at all   Food Insecurity: No Food Insecurity    Worried About Running Out of Food in the Last Year: Never true    Ran Out of Food in the Last Year: Never true   Transportation Needs: Unknown    Lack of Transportation (Non-Medical):  No   Physical Activity: Sufficiently Active    Days of Exercise per Week: 7 days    Minutes of Exercise per Session: 30 min   Housing Stability: Unknown    Unstable Housing in the Last Year: No        Discharge Medication List as of 2023  1:29 PM        CONTINUE these medications which have NOT CHANGED    Details   Hyoscyamine Sulfate SL (LEVSIN/SL) 0.125 MG SUBL Place 1 tablet under the tongue every 4 hours as needed (bladder spasms), Disp-60 each, R-2Normal      cefpodoxime (VANTIN) 100 MG tablet Take 1 tablet by

## 2023-08-29 ENCOUNTER — APPOINTMENT (OUTPATIENT)
Dept: ULTRASOUND IMAGING | Age: 78
DRG: 698 | End: 2023-08-29
Payer: MEDICARE

## 2023-08-29 ENCOUNTER — HOSPITAL ENCOUNTER (INPATIENT)
Age: 78
LOS: 3 days | Discharge: NURSING, REHAB OR LONG-TERM CARE | DRG: 698 | End: 2023-09-01
Attending: EMERGENCY MEDICINE | Admitting: INTERNAL MEDICINE
Payer: MEDICARE

## 2023-08-29 ENCOUNTER — APPOINTMENT (OUTPATIENT)
Dept: CT IMAGING | Age: 78
DRG: 698 | End: 2023-08-29
Payer: MEDICARE

## 2023-08-29 ENCOUNTER — APPOINTMENT (OUTPATIENT)
Dept: GENERAL RADIOLOGY | Age: 78
DRG: 698 | End: 2023-08-29
Payer: MEDICARE

## 2023-08-29 DIAGNOSIS — N10 ACUTE PYELONEPHRITIS: Primary | ICD-10-CM

## 2023-08-29 PROBLEM — A41.9 SEPSIS (HCC): Status: ACTIVE | Noted: 2023-08-29

## 2023-08-29 PROBLEM — R62.7 ADULT FAILURE TO THRIVE: Status: ACTIVE | Noted: 2023-08-29

## 2023-08-29 LAB
ALBUMIN SERPL-MCNC: 3.5 G/DL (ref 3.2–4.6)
ALBUMIN/GLOB SERPL: 0.9 (ref 0.4–1.6)
ALP SERPL-CCNC: 59 U/L (ref 50–136)
ALT SERPL-CCNC: 28 U/L (ref 12–65)
ANION GAP SERPL CALC-SCNC: 6 MMOL/L (ref 2–11)
APPEARANCE UR: ABNORMAL
AST SERPL-CCNC: 32 U/L (ref 15–37)
BACTERIA URNS QL MICRO: ABNORMAL /HPF
BASOPHILS # BLD: 0 K/UL (ref 0–0.2)
BASOPHILS NFR BLD: 1 % (ref 0–2)
BILIRUB SERPL-MCNC: 0.4 MG/DL (ref 0.2–1.1)
BILIRUB UR QL: NEGATIVE
BUN SERPL-MCNC: 22 MG/DL (ref 8–23)
CALCIUM SERPL-MCNC: 9.1 MG/DL (ref 8.3–10.4)
CASTS URNS QL MICRO: ABNORMAL /LPF
CHLORIDE SERPL-SCNC: 100 MMOL/L (ref 101–110)
CO2 SERPL-SCNC: 29 MMOL/L (ref 21–32)
COLOR UR: ABNORMAL
CREAT SERPL-MCNC: 1.3 MG/DL (ref 0.8–1.5)
DIFFERENTIAL METHOD BLD: ABNORMAL
EOSINOPHIL # BLD: 0.1 K/UL (ref 0–0.8)
EOSINOPHIL NFR BLD: 2 % (ref 0.5–7.8)
EPI CELLS #/AREA URNS HPF: ABNORMAL /HPF
ERYTHROCYTE [DISTWIDTH] IN BLOOD BY AUTOMATED COUNT: 13.2 % (ref 11.9–14.6)
GLOBULIN SER CALC-MCNC: 3.9 G/DL (ref 2.8–4.5)
GLUCOSE SERPL-MCNC: 91 MG/DL (ref 65–100)
GLUCOSE UR STRIP.AUTO-MCNC: NEGATIVE MG/DL
HCT VFR BLD AUTO: 40.5 % (ref 41.1–50.3)
HGB BLD-MCNC: 13 G/DL (ref 13.6–17.2)
HGB UR QL STRIP: ABNORMAL
IMM GRANULOCYTES # BLD AUTO: 0 K/UL (ref 0–0.5)
IMM GRANULOCYTES NFR BLD AUTO: 0 % (ref 0–5)
KETONES UR QL STRIP.AUTO: 15 MG/DL
LACTATE SERPL-SCNC: 0.8 MMOL/L (ref 0.4–2)
LACTATE SERPL-SCNC: 1.9 MMOL/L (ref 0.4–2)
LEUKOCYTE ESTERASE UR QL STRIP.AUTO: ABNORMAL
LYMPHOCYTES # BLD: 0.5 K/UL (ref 0.5–4.6)
LYMPHOCYTES NFR BLD: 12 % (ref 13–44)
MAGNESIUM SERPL-MCNC: 2.1 MG/DL (ref 1.8–2.4)
MCH RBC QN AUTO: 29.3 PG (ref 26.1–32.9)
MCHC RBC AUTO-ENTMCNC: 32.1 G/DL (ref 31.4–35)
MCV RBC AUTO: 91.2 FL (ref 82–102)
MONOCYTES # BLD: 0.3 K/UL (ref 0.1–1.3)
MONOCYTES NFR BLD: 8 % (ref 4–12)
NEUTS SEG # BLD: 3.3 K/UL (ref 1.7–8.2)
NEUTS SEG NFR BLD: 77 % (ref 43–78)
NITRITE UR QL STRIP.AUTO: POSITIVE
NRBC # BLD: 0 K/UL (ref 0–0.2)
PH UR STRIP: 6 (ref 5–9)
PLATELET # BLD AUTO: 190 K/UL (ref 150–450)
PMV BLD AUTO: 8.9 FL (ref 9.4–12.3)
POTASSIUM SERPL-SCNC: 4.3 MMOL/L (ref 3.5–5.1)
PROT SERPL-MCNC: 7.4 G/DL (ref 6.3–8.2)
PROT UR STRIP-MCNC: 300 MG/DL
RBC # BLD AUTO: 4.44 M/UL (ref 4.23–5.6)
RBC #/AREA URNS HPF: ABNORMAL /HPF
SODIUM SERPL-SCNC: 135 MMOL/L (ref 133–143)
SP GR UR REFRACTOMETRY: 1.01 (ref 1–1.02)
UROBILINOGEN UR QL STRIP.AUTO: 0.2 EU/DL (ref 0.2–1)
WBC # BLD AUTO: 4.2 K/UL (ref 4.3–11.1)
WBC URNS QL MICRO: >100 /HPF

## 2023-08-29 PROCEDURE — 87086 URINE CULTURE/COLONY COUNT: CPT

## 2023-08-29 PROCEDURE — 2580000003 HC RX 258: Performed by: INTERNAL MEDICINE

## 2023-08-29 PROCEDURE — 73502 X-RAY EXAM HIP UNI 2-3 VIEWS: CPT

## 2023-08-29 PROCEDURE — 81001 URINALYSIS AUTO W/SCOPE: CPT

## 2023-08-29 PROCEDURE — 6360000002 HC RX W HCPCS: Performed by: INTERNAL MEDICINE

## 2023-08-29 PROCEDURE — 1100000000 HC RM PRIVATE

## 2023-08-29 PROCEDURE — 2580000003 HC RX 258: Performed by: EMERGENCY MEDICINE

## 2023-08-29 PROCEDURE — 6370000000 HC RX 637 (ALT 250 FOR IP): Performed by: INTERNAL MEDICINE

## 2023-08-29 PROCEDURE — 87186 SC STD MICRODIL/AGAR DIL: CPT

## 2023-08-29 PROCEDURE — 36415 COLL VENOUS BLD VENIPUNCTURE: CPT

## 2023-08-29 PROCEDURE — 83735 ASSAY OF MAGNESIUM: CPT

## 2023-08-29 PROCEDURE — 6360000002 HC RX W HCPCS: Performed by: EMERGENCY MEDICINE

## 2023-08-29 PROCEDURE — 83605 ASSAY OF LACTIC ACID: CPT

## 2023-08-29 PROCEDURE — 74176 CT ABD & PELVIS W/O CONTRAST: CPT

## 2023-08-29 PROCEDURE — 85025 COMPLETE CBC W/AUTO DIFF WBC: CPT

## 2023-08-29 PROCEDURE — 80053 COMPREHEN METABOLIC PANEL: CPT

## 2023-08-29 PROCEDURE — 99285 EMERGENCY DEPT VISIT HI MDM: CPT

## 2023-08-29 PROCEDURE — 87088 URINE BACTERIA CULTURE: CPT

## 2023-08-29 PROCEDURE — 6370000000 HC RX 637 (ALT 250 FOR IP): Performed by: NURSE PRACTITIONER

## 2023-08-29 PROCEDURE — 87040 BLOOD CULTURE FOR BACTERIA: CPT

## 2023-08-29 RX ORDER — POLYETHYLENE GLYCOL 3350 17 G/17G
17 POWDER, FOR SOLUTION ORAL DAILY PRN
Status: DISCONTINUED | OUTPATIENT
Start: 2023-08-29 | End: 2023-09-01 | Stop reason: HOSPADM

## 2023-08-29 RX ORDER — SODIUM CHLORIDE, SODIUM LACTATE, POTASSIUM CHLORIDE, CALCIUM CHLORIDE 600; 310; 30; 20 MG/100ML; MG/100ML; MG/100ML; MG/100ML
INJECTION, SOLUTION INTRAVENOUS CONTINUOUS
Status: DISCONTINUED | OUTPATIENT
Start: 2023-08-29 | End: 2023-09-01

## 2023-08-29 RX ORDER — ACETAMINOPHEN 325 MG/1
650 TABLET ORAL EVERY 6 HOURS PRN
Status: DISCONTINUED | OUTPATIENT
Start: 2023-08-29 | End: 2023-09-01 | Stop reason: HOSPADM

## 2023-08-29 RX ORDER — OLANZAPINE 2.5 MG/1
5 TABLET ORAL NIGHTLY
Status: DISCONTINUED | OUTPATIENT
Start: 2023-08-29 | End: 2023-09-01 | Stop reason: HOSPADM

## 2023-08-29 RX ORDER — SODIUM CHLORIDE 0.9 % (FLUSH) 0.9 %
5-40 SYRINGE (ML) INJECTION PRN
Status: DISCONTINUED | OUTPATIENT
Start: 2023-08-29 | End: 2023-09-01 | Stop reason: HOSPADM

## 2023-08-29 RX ORDER — ONDANSETRON 2 MG/ML
4 INJECTION INTRAMUSCULAR; INTRAVENOUS EVERY 6 HOURS PRN
Status: DISCONTINUED | OUTPATIENT
Start: 2023-08-29 | End: 2023-09-01 | Stop reason: HOSPADM

## 2023-08-29 RX ORDER — ENOXAPARIN SODIUM 100 MG/ML
30 INJECTION SUBCUTANEOUS EVERY 24 HOURS
Status: DISCONTINUED | OUTPATIENT
Start: 2023-08-29 | End: 2023-09-01 | Stop reason: HOSPADM

## 2023-08-29 RX ORDER — SODIUM CHLORIDE 0.9 % (FLUSH) 0.9 %
5-40 SYRINGE (ML) INJECTION EVERY 12 HOURS SCHEDULED
Status: DISCONTINUED | OUTPATIENT
Start: 2023-08-29 | End: 2023-09-01 | Stop reason: HOSPADM

## 2023-08-29 RX ORDER — ONDANSETRON 4 MG/1
4 TABLET, ORALLY DISINTEGRATING ORAL EVERY 8 HOURS PRN
Status: DISCONTINUED | OUTPATIENT
Start: 2023-08-29 | End: 2023-09-01 | Stop reason: HOSPADM

## 2023-08-29 RX ORDER — AMITRIPTYLINE HYDROCHLORIDE 25 MG/1
100 TABLET, FILM COATED ORAL NIGHTLY
Status: DISCONTINUED | OUTPATIENT
Start: 2023-08-29 | End: 2023-09-01 | Stop reason: HOSPADM

## 2023-08-29 RX ORDER — SODIUM CHLORIDE, SODIUM LACTATE, POTASSIUM CHLORIDE, AND CALCIUM CHLORIDE .6; .31; .03; .02 G/100ML; G/100ML; G/100ML; G/100ML
1000 INJECTION, SOLUTION INTRAVENOUS ONCE
Status: COMPLETED | OUTPATIENT
Start: 2023-08-29 | End: 2023-08-29

## 2023-08-29 RX ORDER — CARVEDILOL 3.12 MG/1
6.25 TABLET ORAL 2 TIMES DAILY WITH MEALS
Status: DISCONTINUED | OUTPATIENT
Start: 2023-08-30 | End: 2023-09-01 | Stop reason: HOSPADM

## 2023-08-29 RX ORDER — CLONAZEPAM 1 MG/1
4 TABLET ORAL NIGHTLY PRN
Status: DISCONTINUED | OUTPATIENT
Start: 2023-08-29 | End: 2023-09-01 | Stop reason: HOSPADM

## 2023-08-29 RX ORDER — ACETAMINOPHEN 650 MG/1
650 SUPPOSITORY RECTAL EVERY 6 HOURS PRN
Status: DISCONTINUED | OUTPATIENT
Start: 2023-08-29 | End: 2023-09-01 | Stop reason: HOSPADM

## 2023-08-29 RX ORDER — SODIUM CHLORIDE 9 MG/ML
INJECTION, SOLUTION INTRAVENOUS PRN
Status: DISCONTINUED | OUTPATIENT
Start: 2023-08-29 | End: 2023-09-01 | Stop reason: HOSPADM

## 2023-08-29 RX ADMIN — ENOXAPARIN SODIUM 30 MG: 100 INJECTION SUBCUTANEOUS at 20:55

## 2023-08-29 RX ADMIN — SODIUM CHLORIDE, PRESERVATIVE FREE 10 ML: 5 INJECTION INTRAVENOUS at 20:55

## 2023-08-29 RX ADMIN — VANCOMYCIN HYDROCHLORIDE 1000 MG: 1 INJECTION, POWDER, LYOPHILIZED, FOR SOLUTION INTRAVENOUS at 17:56

## 2023-08-29 RX ADMIN — AMITRIPTYLINE HYDROCHLORIDE 100 MG: 50 TABLET, FILM COATED ORAL at 22:13

## 2023-08-29 RX ADMIN — SODIUM CHLORIDE, POTASSIUM CHLORIDE, SODIUM LACTATE AND CALCIUM CHLORIDE: 600; 310; 30; 20 INJECTION, SOLUTION INTRAVENOUS at 17:56

## 2023-08-29 RX ADMIN — CLONAZEPAM 4 MG: 1 TABLET ORAL at 22:13

## 2023-08-29 RX ADMIN — SODIUM CHLORIDE, POTASSIUM CHLORIDE, SODIUM LACTATE AND CALCIUM CHLORIDE 1000 ML: 600; 310; 30; 20 INJECTION, SOLUTION INTRAVENOUS at 15:28

## 2023-08-29 RX ADMIN — OLANZAPINE 5 MG: 2.5 TABLET, FILM COATED ORAL at 21:58

## 2023-08-29 RX ADMIN — CEFTRIAXONE 1000 MG: 1 INJECTION, POWDER, FOR SOLUTION INTRAMUSCULAR; INTRAVENOUS at 15:29

## 2023-08-29 ASSESSMENT — PAIN SCALES - GENERAL: PAINLEVEL_OUTOF10: 10

## 2023-08-29 ASSESSMENT — ENCOUNTER SYMPTOMS
RESPIRATORY NEGATIVE: 1
NAUSEA: 0
GASTROINTESTINAL NEGATIVE: 1
BACK PAIN: 0
ABDOMINAL PAIN: 0
DIARRHEA: 0
VOMITING: 0

## 2023-08-29 ASSESSMENT — PAIN DESCRIPTION - LOCATION: LOCATION: BACK

## 2023-08-29 ASSESSMENT — PAIN DESCRIPTION - DESCRIPTORS: DESCRIPTORS: STABBING

## 2023-08-29 NOTE — ED NOTES
TRANSFER - OUT REPORT:    Verbal report given to [facility] on June Mull  being transferred to 6th floor for routine progression of patient care       Report consisted of patient's Situation, Background, Assessment and   Recommendations(SBAR). Information from the following report(s) Nurse Handoff Report was reviewed with the receiving nurse. [place] Fall Assessment:                           Lines:   Peripheral IV 08/29/23 Right Antecubital (Active)       Peripheral IV Left Antecubital (Active)        Opportunity for questions and clarification was provided.       Patient transported with:  Lalita Peres RN  08/29/23 [id number]

## 2023-08-29 NOTE — CARE COORDINATION
Patient returns to ED. He insists he has not turned away home care services however per chart notes they document he declines them. Patient reports his goal is to not have pain.   He wants to return home and will not consider rehab reporting negative experiences with this in the past.

## 2023-08-29 NOTE — ED PROVIDER NOTES
unremarkable. Images of the inferior heart shows  significant atherosclerotic changes in the coronary vessels. The visualized liver, spleen, right adrenal gland, gallbladder, are within  normal limits. The left adrenal gland is difficult to define. There is  persistent mild dilatation of the pancreatic duct. There is a right ureteral stent. There is no hydronephrosis on the right. The left kidney again shows an extrarenal pelvis. There is no obvious evidence  of hydronephrosis. Lack of IV contrast does limit assessment. The bladder contains a John catheter and the distal aspect of the right  ureteral stent. It is empty and otherwise not assessed. Distal esophagus appears unremarkable. The stomach is filled with food material.      The patient is status post total colectomy. There is a left abdominal wall  ostomy and there are no dilated bowel loops to suggest obstruction. No evidence of free fluid or free air. No pathologic adenopathy. No abdominal  aortic aneurysm. The common and external iliac arteries are again prominent in  size. Significant atherosclerotic changes are seen. Osseous structures show no evidence of acute fracture or suspicious lesion. Impression    The left kidney again shows an extrarenal pelvis. There is no obvious evidence  of hydronephrosis. Lack of IV contrast does limit assessment. The bladder contains a John catheter and the distal aspect of the right  ureteral stent. It is empty and otherwise not assessed. Left abdominal wall ostomy. It appears to be patent and there are no dilated  bowel loops to suggest obstruction. Significant atherosclerotic changes. Persistent mild dilatation of the pancreatic duct.        CMP   Result Value Ref Range    Sodium 135 133 - 143 mmol/L    Potassium 4.3 3.5 - 5.1 mmol/L    Chloride 100 (L) 101 - 110 mmol/L    CO2 29 21 - 32 mmol/L    Anion Gap 6 2 - 11 mmol/L    Glucose 91 65 - 100 mg/dL    BUN 22 8 - 23 MG/DL Result Value Ref Range    Magnesium 2.1 1.8 - 2.4 mg/dL        CT ABDOMEN PELVIS RENAL STONE   Final Result   The left kidney again shows an extrarenal pelvis. There is no obvious evidence   of hydronephrosis. Lack of IV contrast does limit assessment. The bladder contains a John catheter and the distal aspect of the right   ureteral stent. It is empty and otherwise not assessed. Left abdominal wall ostomy. It appears to be patent and there are no dilated   bowel loops to suggest obstruction. Significant atherosclerotic changes. Persistent mild dilatation of the pancreatic duct. US RETROPERITONEAL COMPLETE    (Results Pending)                     Voice dictation software was used during the making of this note. This software is not perfect and grammatical and other typographical errors may be present. This note has not been completely proofread for errors.      Lester Villarreal MD  08/29/23 1170

## 2023-08-29 NOTE — ED TRIAGE NOTES
Pt reports x2-3 days of left lower back pain. Denies injury/trauma. Denies urinary s/s. Denies numbness/tingling.

## 2023-08-29 NOTE — ED NOTES
Arrives via ems from home. C/o back pain. Seen yesterday for same.  EMS VSS      Starr Smart RN  08/29/23 1552 Yes

## 2023-08-29 NOTE — PROGRESS NOTES
TRANSFER - IN REPORT:    Verbal report received from [address] [place] [address] on Vini Hay  being received from ED for routine progression of patient care      Report consisted of patient's Situation, Background, Assessment and   Recommendations(SBAR). Information from the following report(s) Nurse Handoff Report was reviewed with the receiving nurse. Opportunity for questions and clarification was provided. Assessment completed upon patient's arrival to unit and care assumed.

## 2023-08-30 ENCOUNTER — APPOINTMENT (OUTPATIENT)
Dept: ULTRASOUND IMAGING | Age: 78
DRG: 698 | End: 2023-08-30
Payer: MEDICARE

## 2023-08-30 LAB
ANION GAP SERPL CALC-SCNC: 3 MMOL/L (ref 2–11)
BACTERIA SPEC CULT: NORMAL
BASOPHILS # BLD: 0 K/UL (ref 0–0.2)
BASOPHILS NFR BLD: 1 % (ref 0–2)
BUN SERPL-MCNC: 12 MG/DL (ref 8–23)
CALCIUM SERPL-MCNC: 8.5 MG/DL (ref 8.3–10.4)
CHLORIDE SERPL-SCNC: 102 MMOL/L (ref 101–110)
CO2 SERPL-SCNC: 34 MMOL/L (ref 21–32)
CREAT SERPL-MCNC: 0.9 MG/DL (ref 0.8–1.5)
DIFFERENTIAL METHOD BLD: ABNORMAL
EOSINOPHIL # BLD: 0.1 K/UL (ref 0–0.8)
EOSINOPHIL NFR BLD: 1 % (ref 0.5–7.8)
ERYTHROCYTE [DISTWIDTH] IN BLOOD BY AUTOMATED COUNT: 13.2 % (ref 11.9–14.6)
GLUCOSE SERPL-MCNC: 74 MG/DL (ref 65–100)
HCT VFR BLD AUTO: 35.7 % (ref 41.1–50.3)
HGB BLD-MCNC: 11.4 G/DL (ref 13.6–17.2)
IMM GRANULOCYTES # BLD AUTO: 0 K/UL (ref 0–0.5)
IMM GRANULOCYTES NFR BLD AUTO: 0 % (ref 0–5)
LYMPHOCYTES # BLD: 0.4 K/UL (ref 0.5–4.6)
LYMPHOCYTES NFR BLD: 11 % (ref 13–44)
MCH RBC QN AUTO: 29.2 PG (ref 26.1–32.9)
MCHC RBC AUTO-ENTMCNC: 31.9 G/DL (ref 31.4–35)
MCV RBC AUTO: 91.3 FL (ref 82–102)
MONOCYTES # BLD: 0.3 K/UL (ref 0.1–1.3)
MONOCYTES NFR BLD: 9 % (ref 4–12)
NEUTS SEG # BLD: 3.1 K/UL (ref 1.7–8.2)
NEUTS SEG NFR BLD: 78 % (ref 43–78)
NRBC # BLD: 0 K/UL (ref 0–0.2)
PLATELET # BLD AUTO: 147 K/UL (ref 150–450)
PMV BLD AUTO: 9.5 FL (ref 9.4–12.3)
POTASSIUM SERPL-SCNC: 3.7 MMOL/L (ref 3.5–5.1)
RBC # BLD AUTO: 3.91 M/UL (ref 4.23–5.6)
SERVICE CMNT-IMP: NORMAL
SODIUM SERPL-SCNC: 139 MMOL/L (ref 133–143)
WBC # BLD AUTO: 3.9 K/UL (ref 4.3–11.1)

## 2023-08-30 PROCEDURE — 2580000003 HC RX 258: Performed by: INTERNAL MEDICINE

## 2023-08-30 PROCEDURE — 6370000000 HC RX 637 (ALT 250 FOR IP): Performed by: INTERNAL MEDICINE

## 2023-08-30 PROCEDURE — 51702 INSERT TEMP BLADDER CATH: CPT

## 2023-08-30 PROCEDURE — 76770 US EXAM ABDO BACK WALL COMP: CPT

## 2023-08-30 PROCEDURE — 6370000000 HC RX 637 (ALT 250 FOR IP): Performed by: NURSE PRACTITIONER

## 2023-08-30 PROCEDURE — 97112 NEUROMUSCULAR REEDUCATION: CPT

## 2023-08-30 PROCEDURE — 97161 PT EVAL LOW COMPLEX 20 MIN: CPT

## 2023-08-30 PROCEDURE — 36415 COLL VENOUS BLD VENIPUNCTURE: CPT

## 2023-08-30 PROCEDURE — 6360000002 HC RX W HCPCS: Performed by: INTERNAL MEDICINE

## 2023-08-30 PROCEDURE — 6360000002 HC RX W HCPCS: Performed by: FAMILY MEDICINE

## 2023-08-30 PROCEDURE — 97530 THERAPEUTIC ACTIVITIES: CPT

## 2023-08-30 PROCEDURE — 97165 OT EVAL LOW COMPLEX 30 MIN: CPT

## 2023-08-30 PROCEDURE — 80048 BASIC METABOLIC PNL TOTAL CA: CPT

## 2023-08-30 PROCEDURE — 1100000000 HC RM PRIVATE

## 2023-08-30 PROCEDURE — 99222 1ST HOSP IP/OBS MODERATE 55: CPT | Performed by: PHYSICIAN ASSISTANT

## 2023-08-30 PROCEDURE — 2580000003 HC RX 258: Performed by: FAMILY MEDICINE

## 2023-08-30 PROCEDURE — 85025 COMPLETE CBC W/AUTO DIFF WBC: CPT

## 2023-08-30 RX ORDER — CYCLOBENZAPRINE HCL 10 MG
5 TABLET ORAL 3 TIMES DAILY PRN
Status: DISCONTINUED | OUTPATIENT
Start: 2023-08-30 | End: 2023-09-01 | Stop reason: HOSPADM

## 2023-08-30 RX ADMIN — VANCOMYCIN HYDROCHLORIDE 750 MG: 750 INJECTION, POWDER, LYOPHILIZED, FOR SOLUTION INTRAVENOUS at 15:57

## 2023-08-30 RX ADMIN — CLONAZEPAM 4 MG: 1 TABLET ORAL at 22:03

## 2023-08-30 RX ADMIN — SODIUM CHLORIDE, POTASSIUM CHLORIDE, SODIUM LACTATE AND CALCIUM CHLORIDE: 600; 310; 30; 20 INJECTION, SOLUTION INTRAVENOUS at 14:02

## 2023-08-30 RX ADMIN — SODIUM CHLORIDE, PRESERVATIVE FREE 10 ML: 5 INJECTION INTRAVENOUS at 22:11

## 2023-08-30 RX ADMIN — OLANZAPINE 5 MG: 2.5 TABLET, FILM COATED ORAL at 22:03

## 2023-08-30 RX ADMIN — CARVEDILOL 6.25 MG: 3.12 TABLET, FILM COATED ORAL at 09:42

## 2023-08-30 RX ADMIN — ENOXAPARIN SODIUM 30 MG: 100 INJECTION SUBCUTANEOUS at 22:03

## 2023-08-30 RX ADMIN — SODIUM CHLORIDE, PRESERVATIVE FREE 10 ML: 5 INJECTION INTRAVENOUS at 09:42

## 2023-08-30 RX ADMIN — CARVEDILOL 6.25 MG: 3.12 TABLET, FILM COATED ORAL at 18:04

## 2023-08-30 RX ADMIN — CEFTRIAXONE 1000 MG: 1 INJECTION, POWDER, FOR SOLUTION INTRAMUSCULAR; INTRAVENOUS at 18:10

## 2023-08-30 RX ADMIN — LEVOTHYROXINE SODIUM 125 MCG: 0.07 TABLET ORAL at 05:18

## 2023-08-30 RX ADMIN — AMITRIPTYLINE HYDROCHLORIDE 100 MG: 50 TABLET, FILM COATED ORAL at 22:03

## 2023-08-30 NOTE — PROGRESS NOTES
Hospitalist Progress Note   Admit Date:  2023 10:27 AM   Name:  Logan Nolasco   Age:  76 y.o. Sex:  male  :  1945   MRN:  [id number]   Room:  [facility]/01    Presenting/Chief Complaint: Back Pain     Reason(s) for Admission: Acute pyelonephritis [N10]  Sepsis [facility]) [A41.9]     Hospital Course:   Maged Goff is a 76 y.o. male with medical history of rectal cancer s/p colostomy, chronic John catheter with recurrent UTIs (also s/p right ureteral stent placement and follows with Dr. Katy James), severe protein-calorie malnutrition, chronic LE weakness, MDD presents with follow-up to the ED with persistent left flank/hip pain. He was seen in the ED 2 days and discharged by the hospitalist. He was given 3 days of Vantin. He denies fevers/chills, chest pain, shortness of breat, nausea/vomiting or diarrhea. ED course: WBC count of 4.2. Blood cultures x2 ordered. CT urogram shows right ureteral stent without obvious hydronephrosis. Given CTX. UA collected. Hospitalist consulted for admission. Subjective & 24hr Events:   C/o mild pain on movement at times left buttock region   Xray hip normal  Added flexeril      Assessment & Plan:     Sepsis ([address])  leukopenia and HR of 95 with suspected urinary etiology  - follow cultures  - consult to urology, ?does ureteral stent need to be removed  - will get renal US  - x-ray of left hip ordered  - LR infusion  - cont antibiotics     Active Problems:    Mild episode of recurrent major depressive disorder ([address])  - home meds       Decreased activities of daily living (ADL)  - complicates course of hospitalization  - patient refuses rehab placement (if indicated) as he has had multiple bad experiences in the past)       Severe protein-calorie malnutrition ([address])  - encourage oral intake       Chronic indwelling John catheter  - per urology       Acquired hypothyroidism  - Synthroid 125 mcg     PT/OT evals and PPD needed/ordered?   Yes  Diet: ADULT

## 2023-08-30 NOTE — PROGRESS NOTES
ACUTE PHYSICAL THERAPY GOALS:   (Developed with and agreed upon by patient and/or caregiver.)    (1.) Tony Marie  will move from supine to sit and sit to supine , scoot up and down, and roll side to side with INDEPENDENT within 7 treatment day(s). (2.) Tony Marie will transfer from bed to chair and chair to bed with INDEPENDENT using the least restrictive device within 7 treatment day(s). (3.) Tony Marie will ambulate with INDEPENDENT for 500 feet with the least restrictive device within 7 treatment day(s). (4.) Tony Marie will perform standing static and dynamic balance activities x 25 minutes with INDEPENDENT to improve safety within 7 treatment day(s). (5.) Tony Marie will ascend and descend 12 stairs using 1 hand rail(s) with SUPERVISION to improve functional mobility and safety within 7 treatment day(s). (6.) Tony Marie will perform therapeutic exercises x 20 min for HEP with INDEPENDENT to improve strength, endurance, and functional mobility within 7 treatment day(s). PHYSICAL THERAPY Initial Assessment, Daily Note, and PM  (Link to Caseload Tracking: PT Visit Days : 1  Acknowledge Orders  Time In/Out  PT Charge Capture  Rehab Caseload Tracker    Tony Marie is a 76 y.o. male   PRIMARY DIAGNOSIS: Sepsis ([address])  Acute pyelonephritis [N10]  Sepsis ([address]) [A41.9]       Reason for Referral: Generalized Muscle Weakness (M62.81)  Difficulty in walking, Not elsewhere classified (R26.2)  Inpatient: Payor: MEDICARE / Plan: MEDICARE PART A AND B / Product Type: *No Product type* /     ASSESSMENT:     REHAB RECOMMENDATIONS:   Recommendation to date pending progress:  Setting:  Inpatient Rehab Facility    Equipment:    To Be Determined     ASSESSMENT:   Mr. Debbi Samson is a 76year old male who presents to hospital 2/2 sepsis.  Pt reports that he lives alone, no one checks in on him, in 2 level home with level entry and 12 steps to second level where

## 2023-08-30 NOTE — H&P
Hospitalist History and Physical   Admit Date:  2023 10:27 AM   Name:  Emily Nolasco   Age:  76 y.o. Sex:  male  :  1945   MRN:  [id number]   Room:  [facility]/    Presenting/Chief Complaint: Back Pain     Reason(s) for Admission: Acute pyelonephritis [N10]  Sepsis ([address]) [A41.9]     History of Present Illness:   Ashely Mercer is a 76 y.o. male with medical history of rectal cancer s/p colostomy, chronic John catheter with recurrent UTIs (also s/p right ureteral stent placement and follows with Dr. Blake Rizo), severe protein-calorie malnutrition, chronic LE weakness, MDD presents with follow-up to the ED with persistent left flank/hip pain. He was seen in the ED 2 days and discharged by the hospitalist. He was given 3 days of Vantin. He denies fevers/chills, chest pain, shortness of breat, nausea/vomiting or diarrhea. ED course: WBC count of 4.2. Blood cultures x2 ordered. CT urogram shows right ureteral stent without obvious hydronephrosis. Given CTX. UA collected. Hospitalist consulted for admission. 10 point ROS negative except for HPI above. Assessment & Plan:     Principal Problem:    Sepsis ([address])  - leukopenia and HR of 95 with suspected urinary etiology  - follow cultures  - consult to urology, ?does ureteral stent need to be removed  - will get renal US  - x-ray of left hip ordered  - LR infusion    Active Problems:    Mild episode of recurrent major depressive disorder ([address])  - home meds      Decreased activities of daily living (ADL)  - complicates course of hospitalization  - patient refuses rehab placement (if indicated) as he has had multiple bad experiences in the past)      Severe protein-calorie malnutrition ([address])  - encourage oral intake      Chronic indwelling John catheter  - per urology      Acquired hypothyroidism  - Synthroid 125 mcg    PT/OT evals and PPD needed/ordered? Yes  Diet: ADULT DIET;  Regular  VTE prophylaxis: Lovenox  Code status: Full

## 2023-08-30 NOTE — PROGRESS NOTES
Pt bruno changed per order & UA sent to lab. Pt is resting in bed without any complaints. Hourly rounds completed and all needs met. Bed is low, locked,call light is in reach, bed alarm on and pt is encouraged to call for assistance.

## 2023-08-30 NOTE — CARE COORDINATION
Met with Mr. Ron Adair at bedside for initial case management assessment. Patient confirmed demographics and PCP. Mr. Ron Adair lives in a one level home with no steps to enter. He says he was independent with mobility and ADLs at most recent baseline and used no DME or services. Mr. Ron Adair says that he is retired and still an active  in the community. Patient understands we are pending therapy evals at this time. CM will continue to follow. 08/30/23 1049   Service Assessment   Patient Orientation Alert and Oriented;Person;Place;Situation   Cognition Alert   History Provided By Patient   Primary Caregiver Self   Accompanied By/Relationship N/A   Support Systems Friends/Neighbors   Patient's Healthcare Decision Maker is: Named in [address]    PCP Verified by CM Yes   Last Visit to PCP Within last 3 months   Prior Functional Level Independent in ADLs/IADLs   Current Functional Level Other (see comment)  (pending therapy evals)   Can patient return to prior living arrangement Unknown at present   Ability to make needs known: Good   Family able to assist with home care needs: Yes   Would you like for me to discuss the discharge plan with any other family members/significant others, and if so, who? No   Financial Resources Medicare   Community Resources None   Social/Functional History   Lives With Alone   Type of [address] [place] [address]  One level   Home Access Level entry   [address] [facility] [address]   [address] [facility] None   ADL Assistance Independent   Homemaking Assistance Independent   Ambulation Assistance Independent   Transfer Assistance Independent   Active  Yes   Occupation Retired   Discharge Planning   Type of [facility] Prior To Admission None   Potential Assistance Needed N/A   DME Ordered?  No   Potential Assistance Purchasing Medications No

## 2023-08-30 NOTE — PROGRESS NOTES
Comprehensive Nutrition Assessment    Type and Reason for Visit: Initial, Positive Nutrition Screen  Best Practice Alert for BMI <18.5    Nutrition Recommendations/Plan:   Meals and Snacks:  Diet: Continue current order  Nutrition Supplement Therapy:  Medical food supplement therapy:  Initiate Ensure High Protein three times per day (this provides 160 kcal and 16 grams protein per bottle)     Malnutrition Assessment:  Malnutrition Status: Severe malnutrition  Context: Chronic Illness  Findings of clinical characteristics of malnutrition:   Energy Intake:  Mild decrease in energy intake (Comment)  Weight Loss:  Mild weight loss (specify amount and time period) (7% loss since Nov 2022)     Body Fat Loss:  Severe body fat loss Orbital, Triceps   Muscle Mass Loss:  Severe muscle mass loss Temples (temporalis), Clavicles (pectoralis & deltoids), Hand (interosseous)  Fluid Accumulation:  No significant fluid accumulation     Strength:  Not Performed     Nutrition Assessment:  Nutrition History: Pt with hx of TPN 2019 following bowel surgeries. Nutrition hx per RD note 11/2022: \"Pt reports variable intake/appetite PTA- typically consumes 2 meals/day + snacks. Pt reports lowest weight of ~115lb prior to recent STR admission however states he gained 8-10lb during STR and now weighs ~123-125lb. Pt states prior to rectal cancer dx and colostomy in 2013 he typically weighed 155lb, reports prior active lifestyle. Pt reports prior intake of ONS in STR but none at home. \"    At encounter today pt relates he continues with 2 meals per day am meal generally 2 packets of grits w 2 eggs and ground turkey, eats out one meal day last example 6 wings and 5-6 shrimps. In addition to this he consumes one serving of 25 gram whey protein mixed in water. He elects not to eat after 3pm secondary to concerns of volume of colostomy output and not wanting to make a mess while sleeping.        Do You Have Any Cultural, Yarsani, or Ethnic Food Preferences?: No   Nutrition Background:       PMH remarkable for rectal Ca s/p colostomy, chronic catheter w recurrent UTIs, R ureteral stent, severe protein calorie malnutrition, chronic LE weakness, MDD. Admitted w sepsis, decreased activities of daily living, severe protein calorie malnutrition. Nutrition Interval:  Pt reclined in bed at RD visit. Alert and oriented times 4. C/o hip pain limiting his ability to mobilize. He has consumed majority of am and noon meals thus far. Amendable to lower sugar oral supplement inpatient. Current Nutrition Therapies:  ADULT DIET; Regular    Current Intake:   Average Meal Intake: 51-75% Average Supplements Intake: None Ordered      Anthropometric Measures:  Height: 5' 8\" (172.7 cm)  Current Body Wt: 114 lb (51.7 kg), Weight source: Bed Scale  BMI: 17.3, Underweight (BMI less than 22) age over 72  Admission Body Weight: 110 lb (49.9 kg) (stated)  Ideal Body Weight (Kg) (Calculated): 70 kg (154 lbs), 74 %  Usual Body Wt: 123 lb (55.8 kg) (11/14/22 bed scale weight), Percent weight change: -7.3       BMI Category Underweight (BMI less than 22) age over 72  Estimated Daily Nutrient Needs:  Energy (kcal/day): 4972-2870 (25-30 kcal/kg) (Kcal/kg Weight used: 51.7 kg Current  Protein (g/day): 62-76 (1.2-1.5 g/kg) Weight Used: (Current) 51.7 kg  Fluid (ml/day):   (1 ml/kcal)    Nutrition Diagnosis:   Predicted inadequate energy intake related to  (usual eating patterns) as evidenced by  (self report, progressive wt loss, hx of compromised intake inpt)  Severe malnutrition, In context of chronic illness related to altered GI function, inadequate protein-energy intake as evidenced by Criteria as identified in malnutrition assessment  Nutrition Interventions:   Food and/or Nutrient Delivery: Continue Current Diet, Start Oral Nutrition Supplement     Coordination of Nutrition Care: Continue to monitor while inpatient, Interdisciplinary Rounds      Goals:       Active

## 2023-08-31 LAB
ANION GAP SERPL CALC-SCNC: 1 MMOL/L (ref 2–11)
BASOPHILS # BLD: 0 K/UL (ref 0–0.2)
BASOPHILS NFR BLD: 1 % (ref 0–2)
BUN SERPL-MCNC: 18 MG/DL (ref 8–23)
CALCIUM SERPL-MCNC: 8.6 MG/DL (ref 8.3–10.4)
CHLORIDE SERPL-SCNC: 103 MMOL/L (ref 101–110)
CO2 SERPL-SCNC: 36 MMOL/L (ref 21–32)
CREAT SERPL-MCNC: 1.1 MG/DL (ref 0.8–1.5)
DIFFERENTIAL METHOD BLD: ABNORMAL
EOSINOPHIL # BLD: 0.1 K/UL (ref 0–0.8)
EOSINOPHIL NFR BLD: 2 % (ref 0.5–7.8)
ERYTHROCYTE [DISTWIDTH] IN BLOOD BY AUTOMATED COUNT: 13.2 % (ref 11.9–14.6)
GLUCOSE SERPL-MCNC: 87 MG/DL (ref 65–100)
HCT VFR BLD AUTO: 33.7 % (ref 41.1–50.3)
HGB BLD-MCNC: 11 G/DL (ref 13.6–17.2)
IMM GRANULOCYTES # BLD AUTO: 0 K/UL (ref 0–0.5)
IMM GRANULOCYTES NFR BLD AUTO: 0 % (ref 0–5)
LYMPHOCYTES # BLD: 0.5 K/UL (ref 0.5–4.6)
LYMPHOCYTES NFR BLD: 15 % (ref 13–44)
MCH RBC QN AUTO: 29.7 PG (ref 26.1–32.9)
MCHC RBC AUTO-ENTMCNC: 32.6 G/DL (ref 31.4–35)
MCV RBC AUTO: 91.1 FL (ref 82–102)
MONOCYTES # BLD: 0.4 K/UL (ref 0.1–1.3)
MONOCYTES NFR BLD: 12 % (ref 4–12)
NEUTS SEG # BLD: 2.5 K/UL (ref 1.7–8.2)
NEUTS SEG NFR BLD: 70 % (ref 43–78)
NRBC # BLD: 0 K/UL (ref 0–0.2)
PLATELET # BLD AUTO: 145 K/UL (ref 150–450)
PMV BLD AUTO: 8.8 FL (ref 9.4–12.3)
POTASSIUM SERPL-SCNC: 4 MMOL/L (ref 3.5–5.1)
RBC # BLD AUTO: 3.7 M/UL (ref 4.23–5.6)
SODIUM SERPL-SCNC: 140 MMOL/L (ref 133–143)
WBC # BLD AUTO: 3.6 K/UL (ref 4.3–11.1)

## 2023-08-31 PROCEDURE — 51702 INSERT TEMP BLADDER CATH: CPT

## 2023-08-31 PROCEDURE — 2580000003 HC RX 258: Performed by: FAMILY MEDICINE

## 2023-08-31 PROCEDURE — 36415 COLL VENOUS BLD VENIPUNCTURE: CPT

## 2023-08-31 PROCEDURE — 97110 THERAPEUTIC EXERCISES: CPT

## 2023-08-31 PROCEDURE — 80048 BASIC METABOLIC PNL TOTAL CA: CPT

## 2023-08-31 PROCEDURE — 6360000002 HC RX W HCPCS: Performed by: INTERNAL MEDICINE

## 2023-08-31 PROCEDURE — 2580000003 HC RX 258: Performed by: INTERNAL MEDICINE

## 2023-08-31 PROCEDURE — 97530 THERAPEUTIC ACTIVITIES: CPT

## 2023-08-31 PROCEDURE — 6360000002 HC RX W HCPCS: Performed by: FAMILY MEDICINE

## 2023-08-31 PROCEDURE — 6370000000 HC RX 637 (ALT 250 FOR IP): Performed by: FAMILY MEDICINE

## 2023-08-31 PROCEDURE — 6370000000 HC RX 637 (ALT 250 FOR IP): Performed by: INTERNAL MEDICINE

## 2023-08-31 PROCEDURE — 85025 COMPLETE CBC W/AUTO DIFF WBC: CPT

## 2023-08-31 PROCEDURE — 6370000000 HC RX 637 (ALT 250 FOR IP): Performed by: NURSE PRACTITIONER

## 2023-08-31 PROCEDURE — 1100000000 HC RM PRIVATE

## 2023-08-31 RX ADMIN — CARVEDILOL 6.25 MG: 3.12 TABLET, FILM COATED ORAL at 17:30

## 2023-08-31 RX ADMIN — OLANZAPINE 5 MG: 2.5 TABLET, FILM COATED ORAL at 22:09

## 2023-08-31 RX ADMIN — SODIUM CHLORIDE, PRESERVATIVE FREE 10 ML: 5 INJECTION INTRAVENOUS at 21:13

## 2023-08-31 RX ADMIN — SODIUM CHLORIDE, POTASSIUM CHLORIDE, SODIUM LACTATE AND CALCIUM CHLORIDE: 600; 310; 30; 20 INJECTION, SOLUTION INTRAVENOUS at 22:17

## 2023-08-31 RX ADMIN — CARVEDILOL 6.25 MG: 3.12 TABLET, FILM COATED ORAL at 08:21

## 2023-08-31 RX ADMIN — SODIUM CHLORIDE, PRESERVATIVE FREE 10 ML: 5 INJECTION INTRAVENOUS at 08:22

## 2023-08-31 RX ADMIN — CYCLOBENZAPRINE 5 MG: 10 TABLET, FILM COATED ORAL at 09:40

## 2023-08-31 RX ADMIN — AMITRIPTYLINE HYDROCHLORIDE 100 MG: 50 TABLET, FILM COATED ORAL at 22:11

## 2023-08-31 RX ADMIN — CEFTRIAXONE 1000 MG: 1 INJECTION, POWDER, FOR SOLUTION INTRAMUSCULAR; INTRAVENOUS at 17:31

## 2023-08-31 RX ADMIN — ENOXAPARIN SODIUM 30 MG: 100 INJECTION SUBCUTANEOUS at 22:08

## 2023-08-31 RX ADMIN — LEVOTHYROXINE SODIUM 125 MCG: 0.07 TABLET ORAL at 05:11

## 2023-08-31 RX ADMIN — CYCLOBENZAPRINE 5 MG: 10 TABLET, FILM COATED ORAL at 17:30

## 2023-08-31 RX ADMIN — CLONAZEPAM 4 MG: 1 TABLET ORAL at 22:08

## 2023-08-31 NOTE — PROGRESS NOTES
Physician Progress Note      PATIENTPurvis Elvia BURTON #:                  [id number]  :                       1945  ADMIT DATE:       2023 10:27 AM  DISCH DATE:  RESPONDING  PROVIDER #:        Jeanine Gonzalez MD          QUERY TEXT:    Patient admitted with Acute pyelonephritis. Noted documentation of sepsis in   H&P . Pt does have leukopenia, no temp, no elev HR, lactic acid normal,   no procal.  In order to support the diagnosis of sepsis, please include   additional clinical indicators in your documentation. Or please document if   the diagnosis of sepsis has been ruled out after further study    The medical record reflects the following:  Risk Factors: S/p ureteral stent,pyelonephritis,Recurrent UTI    Clinical Indicators:    WBC 4.7-4.2-3.9-3.6  LA 0.8-1.9  HR 66-85  RR 12-18    H&P    Acute pyelonephritis, Sepsis - leukopenia and HR of 95. per labs   wbc 3.9 . Urology Consult  for sepsis with chronic carr and ureteral   stent. Treatment: cefTRIAXone, IVFs    Thank you,  Jose Antonio Magaña RN, BSN, CRCR, CCDS, SMART  Clinical Documentation Improvement  [email or web address]  [phone] or via [employer]  Options provided:  -- Sepsis present as evidenced by, Please document evidence. -- Sepsis was ruled out after study  -- Other - I will add my own diagnosis  -- Disagree - Not applicable / Not valid  -- Disagree - Clinically unable to determine / Unknown  -- Refer to Clinical Documentation Reviewer    PROVIDER RESPONSE TEXT:    Sepsis is present as evidenced by wbc 3.9,heart rate>95 and uti    Query created by: Jose Antonio Magaña on 2023 9:10 AM      QUERY TEXT:    Pt admitted with pyelonephritis. Pt noted to have chronic indwelling urinary   catheter and ureteral sent. If possible, please document in the progress   notes and discharge summary if you are evaluating and/or treating any of the   following:     The medical record reflects the following:  Risk Factors: pyelonephritis, UTI chronic carr and ureteral stent. Clinical Indicators: Urology Consult 08/30 for sepsis with chronic carr and   ureteral stent. He has his carr exchanged monthly, last 8/25, and stent   exchanged Q6 months, last 6/29. Treatment: cefTRIAXone, IVFs, replace carr    Thank you,  Whit Espinoza RN, BSN, CRCR, CCDS, SMART  Clinical Documentation Improvement  Gilbertkeshawn [email or web address]. [email or web address]  [phone] or via Perfect Serve  Options provided:  -- UTI due to chronic indwelling urinary catheter  -- UTI not due to indwelling urinary catheter  -- UTI due to ureteral stent  -- UTI not due to ureteral stent  -- Other - I will add my own diagnosis  -- Disagree - Not applicable / Not valid  -- Disagree - Clinically unable to determine / Unknown  -- Refer to Clinical Documentation Reviewer    PROVIDER RESPONSE TEXT:    UTI is due to the chronic indwelling urinary catheter.     Query created by: Whit Espinoza on 8/31/2023 9:15 AM      Electronically signed by:  Parris Heard MD 8/31/2023 10:52 AM

## 2023-08-31 NOTE — CARE COORDINATION
Met with patient at bedside to discuss therapy recommendations. Therapy is currently recommending Inpatient Rehab at discharge. We discussed options and patient expressed interest in remaining here at [facility] and going to [facility]. Referral has been sent. Awaiting determination at this time. CM will continue to follow.

## 2023-08-31 NOTE — PROGRESS NOTES
Hospitalist Progress Note   Admit Date:  2023 10:27 AM   Name:  Kiran Nolasco   Age:  76 y.o. Sex:  male  :  1945   MRN:  [id number]   Room:  [facility]/01    Presenting/Chief Complaint: Back Pain     Reason(s) for Admission: Acute pyelonephritis [N10]  Sepsis [facility]) [A41.9]     Hospital Course:   Marylen Males is a 76 y.o. male with medical history of rectal cancer s/p colostomy, chronic John catheter with recurrent UTIs (also s/p right ureteral stent placement and follows with Dr. Fran Spurling), severe protein-calorie malnutrition, chronic LE weakness, MDD presents with follow-up to the ED with persistent left flank/hip pain. He was seen in the ED 2 days and discharged by the hospitalist. He was given 3 days of Vantin. He denies fevers/chills, chest pain, shortness of breat, nausea/vomiting or diarrhea. ED course: WBC count of 4.2. Blood cultures x2 ordered. CT urogram shows right ureteral stent without obvious hydronephrosis. Given CTX. UA collected. Hospitalist consulted for admission. Subjective & 24hr Events:     C/o mild pain on movement at times left buttock region   Xray hip normal  Added flexeril      Complaints of pain just over the left hip region posterior aspect.   Urine culture E. coli and Enterococcus-sensitivity pending      Assessment & Plan:     Sepsis ([address])  leukopenia and HR of 95 with suspected urinary etiology  - follow cultures  - consult to urology, ?does ureteral stent need to be removed  - will get renal US  - x-ray of left hip ordered  - LR infusion  - cont antibiotics     Active Problems:    Mild episode of recurrent major depressive disorder ([address])  - home meds       Decreased activities of daily living (ADL)  - complicates course of hospitalization  - patient refuses rehab placement (if indicated) as he has had multiple bad experiences in the past)  -continue PT OT and was relaxant  Physical therapy recommending rehab, patient is agreeable

## 2023-08-31 NOTE — PLAN OF CARE
Problem: Discharge Planning  Goal: Discharge to home or other facility with appropriate resources  8/30/2023 2006 by Cele Nathan RN  Outcome: Progressing  8/30/2023 0822 by Farrukh Stephen RN  Outcome: Progressing     Problem: Pain  Goal: Verbalizes/displays adequate comfort level or baseline comfort level  8/30/2023 2006 by Cele Nathan RN  Outcome: Progressing  8/30/2023 0822 by Farrukh Stephen RN  Outcome: Progressing     Problem: Safety - Adult  Goal: Free from fall injury  8/30/2023 2006 by Cele Nathan RN  Outcome: Progressing  8/30/2023 2481 by Farrukh Stephen, RN  Outcome: Progressing

## 2023-08-31 NOTE — PROGRESS NOTES
ACUTE PHYSICAL THERAPY GOALS:   (Developed with and agreed upon by patient and/or caregiver.)  (1.) Elinda Meals  will move from supine to sit and sit to supine , scoot up and down, and roll side to side with INDEPENDENT within 7 treatment day(s). (2.) Elinda Meals will transfer from bed to chair and chair to bed with INDEPENDENT using the least restrictive device within 7 treatment day(s). (3.) Elinda Meals will ambulate with INDEPENDENT for 500 feet with the least restrictive device within 7 treatment day(s). (4.) Elinda Meals will perform standing static and dynamic balance activities x 25 minutes with INDEPENDENT to improve safety within 7 treatment day(s). (5.) Elinda Meals will ascend and descend 12 stairs using 1 hand rail(s) with SUPERVISION to improve functional mobility and safety within 7 treatment day(s). (6.) Elinda Meals will perform therapeutic exercises x 20 min for HEP with INDEPENDENT to improve strength, endurance, and functional mobility within 7 treatment day(s). PHYSICAL THERAPY: Daily Note PM   (Link to Caseload Tracking: PT Visit Days : 2  Time In/Out PT Charge Capture  Rehab Caseload Tracker  Orders    Obdulia Pierre is a 76 y.o. male   PRIMARY DIAGNOSIS: Sepsis ([address])  Acute pyelonephritis [N10]  Sepsis ([address]) [A41.9]       Inpatient: Payor: MEDICARE / Plan: MEDICARE PART A AND B / Product Type: *No Product type* /     ASSESSMENT:     REHAB RECOMMENDATIONS:   Recommendation to date pending progress:  Setting:  Inpatient Rehab Facility    Equipment:    To Be Determined     ASSESSMENT:  Mr. Ino Mclaughlin presents sitting up in chair and is very agreeable to therapy. He stood with CGA, cues for hand placement, and ambulated 500' using rolling walker and CGA/min assist.  He ambulates with wide LONG, short step length, slightly flexed posture, and guidance. Cues to stay closer to rolling walker and more upright posture.   He returned to CGA=Contact Guard Assistance,   Min=Minimal Assistance, Mod=Moderate Assistance, Max=Maximal Assistance, Total=Total Assistance, NT=Not Tested    BALANCE: Good Fair+ Fair Fair- Poor NT Comments   Sitting Static [] [] [] [] [] []    Sitting Dynamic [] [] [] [] [] []              Standing Static [] [] [] [] [] []    Standing Dynamic [] [] [] [] [] []      GAIT: I Mod I S SBA CGA Min Mod Max Total  NT x2 Comments:   Level of Assistance [] [] [] [] [] [] [] [] [] [] []    Distance   feet    DME Rolling Walker    Gait Quality Decreased zoe , Decreased step clearance, Decreased step length, Trunk flexion, and Wide base of support    Weightbearing Status      Stairs      I=Independent, Mod I=Modified Independent, S=Supervision, SBA=Standby Assistance, CGA=Contact Guard Assistance,   Min=Minimal Assistance, Mod=Moderate Assistance, Max=Maximal Assistance, Total=Total Assistance, NT=Not Tested    PLAN:   FREQUENCY AND DURATION: 3 times/week for duration of hospital stay or until stated goals are met, whichever comes first.    TREATMENT:   TREATMENT:   Therapeutic Activity (25 Minutes): Therapeutic activity included Scooting, Transfer Training, Ambulation on level ground, Sitting balance , and Standing balance to improve functional Activity tolerance, Balance, Mobility, and Strength. Therapeutic Exercise (15 Minutes): Therapeutic exercises noted below to improve functional activity tolerance, AROM, strength, and mobility.      TREATMENT GRID:   Date:  8/31/23 Date:   Date:     Activity/Exercise Parameters Parameters Parameters   QS X 10 B     LAQ 2 X 10 B     Standing heel raises 2 X 10 B     Standing hip flexion 2 X 10 B     Standing hip abd 2 X 10 B     Standing hip ext 2 X 10 B               AFTER TREATMENT PRECAUTIONS: Alarm Activated, Bed/Chair Locked, Call light within reach, Chair, Needs within reach, and RN notified    INTERDISCIPLINARY COLLABORATION:  RN/ PCT and PT/ PTA    EDUCATION:      TIME IN/OUT:  Time In:

## 2023-08-31 NOTE — PROGRESS NOTES
Pt stated allevyn on sacrum was irritating and asked if it could be removed so I removed it and he is comfortable now

## 2023-09-01 ENCOUNTER — HOSPITAL ENCOUNTER (INPATIENT)
Age: 78
LOS: 7 days | Discharge: HOME | DRG: 947 | End: 2023-09-08
Attending: PHYSICAL MEDICINE & REHABILITATION | Admitting: PHYSICAL MEDICINE & REHABILITATION
Payer: MEDICARE

## 2023-09-01 VITALS
DIASTOLIC BLOOD PRESSURE: 87 MMHG | OXYGEN SATURATION: 99 % | BODY MASS INDEX: 17.28 KG/M2 | HEART RATE: 62 BPM | HEIGHT: 68 IN | WEIGHT: 114 LBS | RESPIRATION RATE: 16 BRPM | TEMPERATURE: 97.3 F | SYSTOLIC BLOOD PRESSURE: 153 MMHG

## 2023-09-01 DIAGNOSIS — N39.0 ACUTE UTI (URINARY TRACT INFECTION): Primary | ICD-10-CM

## 2023-09-01 PROBLEM — R53.81 PHYSICAL DEBILITY: Status: ACTIVE | Noted: 2023-09-01

## 2023-09-01 PROBLEM — B96.5 PSEUDOMONAS URINARY TRACT INFECTION: Status: ACTIVE | Noted: 2023-09-01

## 2023-09-01 LAB
ANION GAP SERPL CALC-SCNC: 3 MMOL/L (ref 2–11)
BASOPHILS # BLD: 0.1 K/UL (ref 0–0.2)
BASOPHILS NFR BLD: 1 % (ref 0–2)
BUN SERPL-MCNC: 22 MG/DL (ref 8–23)
CALCIUM SERPL-MCNC: 8.2 MG/DL (ref 8.3–10.4)
CHLORIDE SERPL-SCNC: 102 MMOL/L (ref 101–110)
CO2 SERPL-SCNC: 33 MMOL/L (ref 21–32)
CREAT SERPL-MCNC: 1.1 MG/DL (ref 0.8–1.5)
DIFFERENTIAL METHOD BLD: ABNORMAL
EOSINOPHIL # BLD: 0.1 K/UL (ref 0–0.8)
EOSINOPHIL NFR BLD: 2 % (ref 0.5–7.8)
ERYTHROCYTE [DISTWIDTH] IN BLOOD BY AUTOMATED COUNT: 13.2 % (ref 11.9–14.6)
GLUCOSE SERPL-MCNC: 75 MG/DL (ref 65–100)
HCT VFR BLD AUTO: 39.1 % (ref 41.1–50.3)
HGB BLD-MCNC: 12.5 G/DL (ref 13.6–17.2)
IMM GRANULOCYTES # BLD AUTO: 0 K/UL (ref 0–0.5)
IMM GRANULOCYTES NFR BLD AUTO: 0 % (ref 0–5)
LYMPHOCYTES # BLD: 0.5 K/UL (ref 0.5–4.6)
LYMPHOCYTES NFR BLD: 11 % (ref 13–44)
MCH RBC QN AUTO: 29.8 PG (ref 26.1–32.9)
MCHC RBC AUTO-ENTMCNC: 32 G/DL (ref 31.4–35)
MCV RBC AUTO: 93.1 FL (ref 82–102)
MONOCYTES # BLD: 0.3 K/UL (ref 0.1–1.3)
MONOCYTES NFR BLD: 6 % (ref 4–12)
NEUTS SEG # BLD: 3.9 K/UL (ref 1.7–8.2)
NEUTS SEG NFR BLD: 80 % (ref 43–78)
NRBC # BLD: 0 K/UL (ref 0–0.2)
PLATELET # BLD AUTO: 183 K/UL (ref 150–450)
PMV BLD AUTO: 9.4 FL (ref 9.4–12.3)
POTASSIUM SERPL-SCNC: 4.5 MMOL/L (ref 3.5–5.1)
RBC # BLD AUTO: 4.2 M/UL (ref 4.23–5.6)
SODIUM SERPL-SCNC: 138 MMOL/L (ref 133–143)
WBC # BLD AUTO: 4.8 K/UL (ref 4.3–11.1)

## 2023-09-01 PROCEDURE — 6370000000 HC RX 637 (ALT 250 FOR IP): Performed by: INTERNAL MEDICINE

## 2023-09-01 PROCEDURE — 97535 SELF CARE MNGMENT TRAINING: CPT

## 2023-09-01 PROCEDURE — 97165 OT EVAL LOW COMPLEX 30 MIN: CPT

## 2023-09-01 PROCEDURE — 97162 PT EVAL MOD COMPLEX 30 MIN: CPT

## 2023-09-01 PROCEDURE — 6360000002 HC RX W HCPCS: Performed by: PHYSICAL MEDICINE & REHABILITATION

## 2023-09-01 PROCEDURE — 97530 THERAPEUTIC ACTIVITIES: CPT

## 2023-09-01 PROCEDURE — 97116 GAIT TRAINING THERAPY: CPT

## 2023-09-01 PROCEDURE — 36415 COLL VENOUS BLD VENIPUNCTURE: CPT

## 2023-09-01 PROCEDURE — 1180000000 HC REHAB R&B

## 2023-09-01 PROCEDURE — 6370000000 HC RX 637 (ALT 250 FOR IP): Performed by: FAMILY MEDICINE

## 2023-09-01 PROCEDURE — 99231 SBSQ HOSP IP/OBS SF/LOW 25: CPT | Performed by: PHYSICIAN ASSISTANT

## 2023-09-01 PROCEDURE — 85025 COMPLETE CBC W/AUTO DIFF WBC: CPT

## 2023-09-01 PROCEDURE — 80048 BASIC METABOLIC PNL TOTAL CA: CPT

## 2023-09-01 PROCEDURE — 99223 1ST HOSP IP/OBS HIGH 75: CPT | Performed by: PHYSICAL MEDICINE & REHABILITATION

## 2023-09-01 PROCEDURE — 51702 INSERT TEMP BLADDER CATH: CPT

## 2023-09-01 PROCEDURE — 2580000003 HC RX 258: Performed by: INTERNAL MEDICINE

## 2023-09-01 PROCEDURE — 6370000000 HC RX 637 (ALT 250 FOR IP): Performed by: PHYSICAL MEDICINE & REHABILITATION

## 2023-09-01 RX ORDER — OLANZAPINE 2.5 MG/1
5 TABLET ORAL NIGHTLY
Status: CANCELLED | OUTPATIENT
Start: 2023-09-01

## 2023-09-01 RX ORDER — ACETAMINOPHEN 325 MG/1
650 TABLET ORAL EVERY 6 HOURS PRN
Status: DISCONTINUED | OUTPATIENT
Start: 2023-09-01 | End: 2023-09-08 | Stop reason: HOSPADM

## 2023-09-01 RX ORDER — CYCLOBENZAPRINE HCL 10 MG
5 TABLET ORAL 3 TIMES DAILY PRN
Status: DISCONTINUED | OUTPATIENT
Start: 2023-09-01 | End: 2023-09-08 | Stop reason: HOSPADM

## 2023-09-01 RX ORDER — AMITRIPTYLINE HYDROCHLORIDE 25 MG/1
100 TABLET, FILM COATED ORAL NIGHTLY
Status: DISCONTINUED | OUTPATIENT
Start: 2023-09-01 | End: 2023-09-08 | Stop reason: HOSPADM

## 2023-09-01 RX ORDER — CYCLOBENZAPRINE HCL 10 MG
5 TABLET ORAL 3 TIMES DAILY PRN
Status: CANCELLED | OUTPATIENT
Start: 2023-09-01

## 2023-09-01 RX ORDER — POLYETHYLENE GLYCOL 3350 17 G/17G
17 POWDER, FOR SOLUTION ORAL DAILY PRN
Status: DISCONTINUED | OUTPATIENT
Start: 2023-09-01 | End: 2023-09-08 | Stop reason: HOSPADM

## 2023-09-01 RX ORDER — CIPROFLOXACIN 250 MG/1
500 TABLET, FILM COATED ORAL EVERY 12 HOURS SCHEDULED
Status: COMPLETED | OUTPATIENT
Start: 2023-09-01 | End: 2023-09-08

## 2023-09-01 RX ORDER — ENOXAPARIN SODIUM 100 MG/ML
30 INJECTION SUBCUTANEOUS EVERY 24 HOURS
Status: COMPLETED | OUTPATIENT
Start: 2023-09-01 | End: 2023-09-01

## 2023-09-01 RX ORDER — POLYETHYLENE GLYCOL 3350 17 G/17G
17 POWDER, FOR SOLUTION ORAL DAILY PRN
Status: CANCELLED | OUTPATIENT
Start: 2023-09-01

## 2023-09-01 RX ORDER — CIPROFLOXACIN 500 MG/1
500 TABLET, FILM COATED ORAL 2 TIMES DAILY
Qty: 14 TABLET | Refills: 0
Start: 2023-09-01 | End: 2023-09-07 | Stop reason: HOSPADM

## 2023-09-01 RX ORDER — ONDANSETRON 2 MG/ML
4 INJECTION INTRAMUSCULAR; INTRAVENOUS EVERY 6 HOURS PRN
Status: CANCELLED | OUTPATIENT
Start: 2023-09-01

## 2023-09-01 RX ORDER — CLONAZEPAM 1 MG/1
4 TABLET ORAL NIGHTLY PRN
Status: CANCELLED | OUTPATIENT
Start: 2023-09-01

## 2023-09-01 RX ORDER — OLANZAPINE 5 MG/1
5 TABLET ORAL NIGHTLY
Status: DISCONTINUED | OUTPATIENT
Start: 2023-09-01 | End: 2023-09-08 | Stop reason: HOSPADM

## 2023-09-01 RX ORDER — ONDANSETRON 2 MG/ML
4 INJECTION INTRAMUSCULAR; INTRAVENOUS EVERY 6 HOURS PRN
Status: DISCONTINUED | OUTPATIENT
Start: 2023-09-01 | End: 2023-09-08 | Stop reason: HOSPADM

## 2023-09-01 RX ORDER — LEVOTHYROXINE SODIUM 0.12 MG/1
125 TABLET ORAL DAILY
Status: DISCONTINUED | OUTPATIENT
Start: 2023-09-02 | End: 2023-09-08 | Stop reason: HOSPADM

## 2023-09-01 RX ORDER — CIPROFLOXACIN 500 MG/1
500 TABLET, FILM COATED ORAL EVERY 12 HOURS SCHEDULED
Status: CANCELLED | OUTPATIENT
Start: 2023-09-01 | End: 2023-09-06

## 2023-09-01 RX ORDER — CIPROFLOXACIN 250 MG/1
500 TABLET, FILM COATED ORAL EVERY 12 HOURS SCHEDULED
Status: DISCONTINUED | OUTPATIENT
Start: 2023-09-01 | End: 2023-09-01

## 2023-09-01 RX ORDER — CLONAZEPAM 1 MG/1
4 TABLET ORAL NIGHTLY PRN
Status: DISCONTINUED | OUTPATIENT
Start: 2023-09-01 | End: 2023-09-08 | Stop reason: HOSPADM

## 2023-09-01 RX ORDER — ENOXAPARIN SODIUM 100 MG/ML
30 INJECTION SUBCUTANEOUS EVERY 24 HOURS
Status: DISCONTINUED | OUTPATIENT
Start: 2023-09-01 | End: 2023-09-01

## 2023-09-01 RX ORDER — ACETAMINOPHEN 650 MG/1
650 SUPPOSITORY RECTAL EVERY 6 HOURS PRN
Status: DISCONTINUED | OUTPATIENT
Start: 2023-09-01 | End: 2023-09-08 | Stop reason: HOSPADM

## 2023-09-01 RX ORDER — ACETAMINOPHEN 650 MG/1
650 SUPPOSITORY RECTAL EVERY 6 HOURS PRN
Status: CANCELLED | OUTPATIENT
Start: 2023-09-01

## 2023-09-01 RX ORDER — SODIUM CHLORIDE 0.9 % (FLUSH) 0.9 %
5-40 SYRINGE (ML) INJECTION PRN
Status: CANCELLED | OUTPATIENT
Start: 2023-09-01

## 2023-09-01 RX ORDER — ONDANSETRON 4 MG/1
4 TABLET, ORALLY DISINTEGRATING ORAL EVERY 8 HOURS PRN
Status: CANCELLED | OUTPATIENT
Start: 2023-09-01

## 2023-09-01 RX ORDER — ACETAMINOPHEN 325 MG/1
650 TABLET ORAL EVERY 6 HOURS PRN
Status: CANCELLED | OUTPATIENT
Start: 2023-09-01

## 2023-09-01 RX ORDER — AMITRIPTYLINE HYDROCHLORIDE 25 MG/1
100 TABLET, FILM COATED ORAL NIGHTLY
Status: CANCELLED | OUTPATIENT
Start: 2023-09-01

## 2023-09-01 RX ORDER — ONDANSETRON 4 MG/1
4 TABLET, ORALLY DISINTEGRATING ORAL EVERY 8 HOURS PRN
Status: DISCONTINUED | OUTPATIENT
Start: 2023-09-01 | End: 2023-09-08 | Stop reason: HOSPADM

## 2023-09-01 RX ORDER — CARVEDILOL 3.12 MG/1
6.25 TABLET ORAL 2 TIMES DAILY WITH MEALS
Status: DISCONTINUED | OUTPATIENT
Start: 2023-09-01 | End: 2023-09-08 | Stop reason: HOSPADM

## 2023-09-01 RX ORDER — SODIUM CHLORIDE 0.9 % (FLUSH) 0.9 %
5-40 SYRINGE (ML) INJECTION PRN
Status: DISCONTINUED | OUTPATIENT
Start: 2023-09-01 | End: 2023-09-08 | Stop reason: HOSPADM

## 2023-09-01 RX ORDER — CARVEDILOL 3.12 MG/1
6.25 TABLET ORAL 2 TIMES DAILY WITH MEALS
Status: CANCELLED | OUTPATIENT
Start: 2023-09-01

## 2023-09-01 RX ORDER — ENOXAPARIN SODIUM 100 MG/ML
30 INJECTION SUBCUTANEOUS EVERY 24 HOURS
Status: CANCELLED | OUTPATIENT
Start: 2023-09-01

## 2023-09-01 RX ORDER — CIPROFLOXACIN 500 MG/1
500 TABLET, FILM COATED ORAL EVERY 12 HOURS SCHEDULED
Status: DISCONTINUED | OUTPATIENT
Start: 2023-09-01 | End: 2023-09-01 | Stop reason: HOSPADM

## 2023-09-01 RX ADMIN — SODIUM CHLORIDE, PRESERVATIVE FREE 10 ML: 5 INJECTION INTRAVENOUS at 08:16

## 2023-09-01 RX ADMIN — CIPROFLOXACIN 500 MG: 250 TABLET, FILM COATED ORAL at 21:36

## 2023-09-01 RX ADMIN — CARVEDILOL 6.25 MG: 3.12 TABLET, FILM COATED ORAL at 08:16

## 2023-09-01 RX ADMIN — CIPROFLOXACIN 500 MG: 500 TABLET, FILM COATED ORAL at 09:40

## 2023-09-01 RX ADMIN — SODIUM CHLORIDE, POTASSIUM CHLORIDE, SODIUM LACTATE AND CALCIUM CHLORIDE: 600; 310; 30; 20 INJECTION, SOLUTION INTRAVENOUS at 08:19

## 2023-09-01 RX ADMIN — CARVEDILOL 6.25 MG: 3.12 TABLET, FILM COATED ORAL at 18:02

## 2023-09-01 RX ADMIN — OLANZAPINE 5 MG: 5 TABLET, FILM COATED ORAL at 22:06

## 2023-09-01 RX ADMIN — CLONAZEPAM 4 MG: 1 TABLET ORAL at 22:05

## 2023-09-01 RX ADMIN — AMITRIPTYLINE HYDROCHLORIDE 100 MG: 50 TABLET, FILM COATED ORAL at 22:05

## 2023-09-01 RX ADMIN — ENOXAPARIN SODIUM 30 MG: 100 INJECTION SUBCUTANEOUS at 21:30

## 2023-09-01 RX ADMIN — LEVOTHYROXINE SODIUM 125 MCG: 0.07 TABLET ORAL at 05:09

## 2023-09-01 NOTE — THERAPY EVALUATION
PHYSICAL THERAPY EXAMINATION    Time In: 18  Time Out: 1214  Total Treatment Time:  62 Minutes         HPI:  This is a 76year old with past medical history of rectal cancer s/p colostomy, chronic John catheter with recurrent UTIs (also s/p right ureteral stent placement due to bladder injury, follows with Dr. Gabriel Spencer), severe protein-calorie malnutrition, chronic LE weakness who was admitted on 8/29 with persistent left flank/hip pain. Blood cultures no growth to date. Preliminary urine culture positive for pseudomonas and enterococcus. Sensitive to ciprofloxacin. CT urogram shows right ureteral stent without obvious hydronephrosis. Acute therapies were initiated and the patient was starting to mobilize. He has significant mobility and self care impairments. It was felt he would benefit from comprehensive acute inpatient rehabilitation, continued close medical supervision and management prior to returning home. The medical stability and these recent medical events are not expected to interfere with the patient's ability to tolerate the intensity of services in acute rehab.     Past Medical History:   Past Medical History:   Diagnosis Date    Acute deep vein thrombosis (DVT) of non-extremity vein 5/20/2019    Anemia     Cancer (HCC)     Family history of malignant neoplasm of gastrointestinal tract     mother colon/ovarian cancer 67    Fecal incontinence     LAR syndrome    John catheter in place 2022    patient stated- \"catheter due to them nicking his bladder and it won't heal\"    Former cigarette smoker     History of rectal cancer     Hypothyroid     stable w/med    Infection and inflammatory reaction due to other internal prosthetic devices, implants and grafts, subsequent encounter 2017    abd wound/mesh colostomy    Insomnia     takes meds    Major depression     Osteoarthritis, hand     Personal history of colonic polyps 9/2013    x 1    Personal history of malignant neoplasm of rectum, rectosigmoid

## 2023-09-01 NOTE — PROGRESS NOTES
Comprehensive Nutrition Assessment    Type and Reason for Visit: [address] for BMI <18.5    Nutrition Recommendations/Plan:   Meals and Snacks:  Diet: Continue current order  Pt requests larger portions diet odrder updated  Nutrition Supplement Therapy:  Medical food supplement therapy:  Continue Ensure High Protein three times per day (this provides 160 kcal and 16 grams protein per bottle)     Malnutrition Assessment:  Malnutrition Status: Severe malnutrition  Context: Chronic Illness  Findings of clinical characteristics of malnutrition:   Energy Intake:  Mild decrease in energy intake (Comment)  Weight Loss:  Mild weight loss (specify amount and time period) (7% loss since Nov 2022)     Body Fat Loss:  Severe body fat loss Orbital, Triceps   Muscle Mass Loss:  Severe muscle mass loss Temples (temporalis), Clavicles (pectoralis & deltoids), Hand (interosseous)  Fluid Accumulation:  No significant fluid accumulation     Strength:  Not Performed     Nutrition Assessment:  Nutrition History: Pt with hx of TPN 2019 following bowel surgeries. Nutrition hx per RD note 11/2022: \"Pt reports variable intake/appetite PTA- typically consumes 2 meals/day + snacks. Pt reports lowest weight of ~115lb prior to recent STR admission however states he gained 8-10lb during STR and now weighs ~123-125lb. Pt states prior to rectal cancer dx and colostomy in 2013 he typically weighed 155lb, reports prior active lifestyle. Pt reports prior intake of ONS in STR but none at home. \"    At encounter today pt relates he continues with 2 meals per day am meal generally 2 packets of grits w 2 eggs and ground turkey, eats out one meal day last example 6 wings and 5-6 shrimps. In addition to this he consumes one serving of 25 gram whey protein mixed in water. He elects not to eat after 3pm secondary to concerns of volume of colostomy output and not wanting to make a mess while sleeping.        Do You Have Any Cultural,

## 2023-09-01 NOTE — CARE COORDINATION
Patient is for discharge to 9th floor IRC today has planned. Patient will go to room 918 and report can be called to extension [phone].

## 2023-09-01 NOTE — PROGRESS NOTES
[facility] OCCUPATIONAL THERAPY INITIAL EVALUATION    Time In    1116   Time Out    1213     HPI (per MD report): \"This is a 76year old with past medical history of rectal cancer s/p colostomy, chronic John catheter with recurrent UTIs (also s/p right ureteral stent placement due to bladder injury, follows with Dr. Dipak Spencer), severe protein-calorie malnutrition, chronic LE weakness who was admitted on 8/29 with persistent left flank/hip pain. Blood cultures no growth to date. Preliminary urine culture positive for pseudomonas and enterococcus. Sensitive to ciprofloxacin. CT urogram shows right ureteral stent without obvious hydronephrosis. Acute therapies were initiated and the patient was starting to mobilize. He has significant mobility and self care impairments. It was felt he would benefit from comprehensive acute inpatient rehabilitation, continued close medical supervision and management prior to returning home. The medical stability and these recent medical events are not expected to interfere with the patient's ability to tolerate the intensity of services in acute rehab. \"  Past Medical History:   Diagnosis Date    Acute deep vein thrombosis (DVT) of non-extremity vein 5/20/2019    Anemia     Cancer (HCC)     Family history of malignant neoplasm of gastrointestinal tract     mother colon/ovarian cancer 67    Fecal incontinence     LAR syndrome    John catheter in place 2022    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

## 2023-09-01 NOTE — PROGRESS NOTES
ACUTE OCCUPATIONAL THERAPY GOALS:   (Developed with and agreed upon by patient and/or caregiver.)  1. Kodi Serrano will be modified independent with functional mobility for ADL  within 4 - 7 visits. [id number] will be modified independent with total body bathing and dressing within 4 - 7 visits. [id number] will participate at least 30 minutes of ADL with 3 or less rest breaks within 7 visits. [id number] will complete a tub or shower transfer with modified independence within 7 visits. OCCUPATIONAL THERAPY: Daily Note AM   OT Visit Days: 1   Time In/Out  OT Charge Capture  Rehab Caseload Tracker  OT Orders    Kodi Serrano is a 76 y.o. male   PRIMARY DIAGNOSIS: Sepsis ([address])  Acute pyelonephritis [N10]  Sepsis ([address]) [A41.9]       Inpatient: Payor: MEDICARE / Plan: MEDICARE PART A AND B / Product Type: *No Product type* /     ASSESSMENT:     REHAB RECOMMENDATIONS: CURRENT LEVEL OF FUNCTION:  (Most Recently Demonstrated)   Recommendation to date pending progress:  Setting:  Inpatient Rehab Facility     Equipment:    To Be Determined Bathing:  Contact Guard Assist  Dressing:  Contact Guard Assist  Feeding/Grooming:  Contact Guard Assist  Toileting:  Not Tested  Functional Mobility:  Contact Guard Assist     ASSESSMENT:  Mr. Curtis Mendoza presents supine in the bed and is agreeable to OT activity. Pt typically is independent with functional mobility and wants to initially try to take steps without the walker. Pt noted to have wide base of support and keep arms in guarded position with ability to take a few shuffling steps. Pt more steady and confident with use of the RW. Pt was able to work on standing tolerance at the sink while completing grooming and upper body ADL with CGA. Pt completed functional mobility in the room and hallway with SBA/CGA using the walker. Pt wants to become less dependent on the walker and improve balance and strength.  Pt left up in the recliner

## 2023-09-01 NOTE — PROGRESS NOTES
TRANSFER - OUT REPORT:    Verbal report given to Aye Carson on Kodi Serrano  being transferred to [facility] for routine progression of patient care       Report consisted of patient's Situation, Background, Assessment and   Recommendations(SBAR). Information from the following report(s) Nurse Handoff Report was reviewed with the receiving nurse. Lines:   Peripheral IV 08/29/23 Right Antecubital (Active)   Site Assessment Clean, dry & intact 09/01/23 0712   Line Status Flushed 09/01/23 0712   Line Care Connections checked and tightened 09/01/23 0712   Phlebitis Assessment No symptoms 09/01/23 0712   Infiltration Assessment 0 09/01/23 0712   Alcohol Cap Used Yes 09/01/23 0241   Dressing Status Clean, dry & intact 09/01/23 0712   Dressing Type Transparent 09/01/23 0712   Dressing Intervention New 08/31/23 1436       Peripheral IV Left Antecubital (Active)   Site Assessment Clean, dry & intact 09/01/23 0712   Line Status Flushed 09/01/23 0712   Line Care Connections checked and tightened 09/01/23 0712   Phlebitis Assessment No symptoms 09/01/23 0712   Infiltration Assessment 0 09/01/23 0712   Alcohol Cap Used Yes 09/01/23 0241   Dressing Status Clean, dry & intact 09/01/23 1721   Dressing Type Transparent 09/01/23 5233        Opportunity for questions and clarification was provided.       Patient transported with:  O2 @ 0lpm

## 2023-09-01 NOTE — PROGRESS NOTES
Physical Therapy  PHYSICAL THERAPY DAILY NOTE  Time In:  7813   Time Out:  1350  Total Treatment Time:  39 Minutes  Pt. Seen for: PM, Balance Training, Gait Training, Patient Education, and Transfer Training     Subjective: Pt reports he would like a leg bag for his carr during the day. --RN informed         Objective:  Precautions: Falls, Poor Safety Awareness, and Impulsive     GROSS ASSESSMENT Daily Assessment    N/a       COGNITION Daily Assessment    Orientation:A&O x 3  Communication:able to express needs, able to follow one step commands without assistance  Social Interaction:cooperative, appropriate with PT, participating, motivated to improve  Memory:appears intact        BED/MAT MOBILITY Daily Assessment   Chair<> bed x2 Rolling Right: Modified Independent  Rolling Left: Modified Independent  Supine to Sit: CGA  Sit to Supine: CGA       TRANSFERS Daily Assessment   Sit/stand from recliner/bed, pt pushes back of legs against bed to rise and maintain balance upon initial standing, car transfer with cues for technique as pt not used to getting in with RW Sit to Stand: CGA  Stand to Sit: CGA/min  Transfer Type: Stand Pivot  Transfer Assistance: CGA  Car Transfers: Min A         GAIT Daily Assessment   Wide LONG, cues for heel strike and increased step height Amount of Assistance: CGA  Distance (ft): 300  Assistive Device: RW  Surface: Level Surface       STEPS/STAIRS Daily Assessment   Curb and 4 stairs with L ascending rail, pt unable to clear heels with descending, cues to put entire foot on step when ascending.  May be more safe using sideways tech Steps Ambulated:  4, 1  Level of Assistance:  Min A  Railing:Single Railing on L  Assistive Device: n/a       BALANCE Daily Assessment   5 reps with 30 sec sit/stand  30 sec with standing EO/EC, and 20 sec tandom EO  Unable to perform SLS B LE   Static Sitting: Good:  Pt. able to maintain balance w/o UE support;  exhibits some postural sway  Dynamic Sitting: Good -

## 2023-09-01 NOTE — DISCHARGE SUMMARY
Hospitalist Discharge Summary   Admit Date:  2023 10:27 AM   DC Note date: 2023  Name:  Aroldo Lamb   Age:  76 y.o. Sex:  male  :  1945   MRN:  [id number]   Room:  [facility]  PCP:  Genevieve Rubio DO    Presenting Complaint: Back Pain     Initial Admission Diagnosis: Acute pyelonephritis [N10]  Sepsis ([address]) [A41.9]     Problem List for this Hospitalization (present on admission):    Principal Problem:    Sepsis ([address])  Active Problems:    Mild episode of recurrent major depressive disorder ([address])    Decreased activities of daily living (ADL)    Severe protein-calorie malnutrition ([address])    Chronic indwelling John catheter    Acquired hypothyroidism    Adult failure to thrive    Pseudomonas urinary tract infection  Resolved Problems:    * No resolved hospital problems. *    H&P  Aroldo Lamb is a 76 y.o. male with medical history of rectal cancer s/p colostomy, chronic John catheter with recurrent UTIs (also s/p right ureteral stent placement and follows with Dr. Shankar Crowe), severe protein-calorie malnutrition, chronic LE weakness, MDD presents with follow-up to the ED with persistent left flank/hip pain. He was seen in the ED 2 days and discharged by the hospitalist. He was given 3 days of Vantin. He denies fevers/chills, chest pain, shortness of breat, nausea/vomiting or diarrhea. ED course: WBC count of 4.2. Blood cultures x2 ordered. CT urogram shows right ureteral stent without obvious hydronephrosis. Given CTX. UA collected. Hospitalist consulted for admission. Hospital Course:  Admitted for sepsis secondary to UTI and also left buttock region pain. Clinically did not look like pyelonephritis as his pain was initially in the left buttock region and later on left lower lateral lumbar region. No significant tenderness on palpation. Urology was consulted because of his chronic catheter and also history of stent.   As per urology note his John was recently changed on  and

## 2023-09-01 NOTE — H&P
Metabolic Panel w/ Reflex to MG    Collection Time: 09/01/23  6:31 AM   Result Value Ref Range    Sodium 138 133 - 143 mmol/L    Potassium 4.5 3.5 - 5.1 mmol/L    Chloride 102 101 - 110 mmol/L    CO2 33 (H) 21 - 32 mmol/L    Anion Gap 3 2 - 11 mmol/L    Glucose 75 65 - 100 mg/dL    BUN 22 8 - 23 MG/DL    Creatinine 1.10 0.8 - 1.5 MG/DL    Est, Glom Filt Rate >60 >60 ml/min/1.73m2    Calcium 8.2 (L) 8.3 - 10.4 MG/DL   CBC with Auto Differential    Collection Time: 09/01/23  9:48 AM   Result Value Ref Range    WBC 4.8 4.3 - 11.1 K/uL    RBC 4.20 (L) 4.23 - 5.6 M/uL    Hemoglobin 12.5 (L) 13.6 - 17.2 g/dL    Hematocrit 39.1 (L) 41.1 - 50.3 %    MCV 93.1 82 - 102 FL    MCH 29.8 26.1 - 32.9 PG    MCHC 32.0 31.4 - 35.0 g/dL    RDW 13.2 11.9 - 14.6 %    Platelets 745 638 - 397 K/uL    MPV 9.4 9.4 - 12.3 FL    nRBC 0.00 0.0 - 0.2 K/uL    Differential Type AUTOMATED      Neutrophils % 80 (H) 43 - 78 %    Lymphocytes % 11 (L) 13 - 44 %    Monocytes % 6 4.0 - 12.0 %    Eosinophils % 2 0.5 - 7.8 %    Basophils % 1 0.0 - 2.0 %    Immature Granulocytes 0 0.0 - 5.0 %    Neutrophils Absolute 3.9 1.7 - 8.2 K/UL    Lymphocytes Absolute 0.5 0.5 - 4.6 K/UL    Monocytes Absolute 0.3 0.1 - 1.3 K/UL    Eosinophils Absolute 0.1 0.0 - 0.8 K/UL    Basophils Absolute 0.1 0.0 - 0.2 K/UL    Absolute Immature Granulocyte 0.0 0.0 - 0.5 K/UL     Assessment:  The Post Assessment Physician Evaluation (BUDDY) found the current functional status to be comparable with the Pre-admission Screening. The Patient is a good candidate for acute inpatient rehabilitation. Nothing since the Pre-admission screen has changed that determination. Rehabilitation Plan  The patient has shown the ability to tolerate and benefit from 3 hours of therapy daily and is being admitted to a comprehensive acute inpatient rehabilitation program consisting of at least 3 hours of combined physical and occupational therapies.   Begin intensive Physical Therapy for a minimum of frequent ER visitations due to UTI and weakness could potentially impact the IRF program with decreased cardiovascular efficiency, postural hypotension, cardiac arrhythmia, fever and sepsis. For these ongoing medical issues,rehabilitation services could not be safely provided at a lower level of care such as a skilled nursing facility or nursing home.     Signed By: Merwin Alpers, MD     September 1, 2023

## 2023-09-02 LAB
BACTERIA SPEC CULT: ABNORMAL
BACTERIA SPEC CULT: ABNORMAL
BACTERIA SPEC CULT: NORMAL
BACTERIA SPEC CULT: NORMAL
SERVICE CMNT-IMP: ABNORMAL
SERVICE CMNT-IMP: NORMAL
SERVICE CMNT-IMP: NORMAL

## 2023-09-02 PROCEDURE — 97110 THERAPEUTIC EXERCISES: CPT

## 2023-09-02 PROCEDURE — 6370000000 HC RX 637 (ALT 250 FOR IP): Performed by: PHYSICAL MEDICINE & REHABILITATION

## 2023-09-02 PROCEDURE — 1180000000 HC REHAB R&B

## 2023-09-02 PROCEDURE — 97530 THERAPEUTIC ACTIVITIES: CPT

## 2023-09-02 PROCEDURE — 97116 GAIT TRAINING THERAPY: CPT

## 2023-09-02 PROCEDURE — 97535 SELF CARE MNGMENT TRAINING: CPT

## 2023-09-02 RX ADMIN — CIPROFLOXACIN 500 MG: 250 TABLET, FILM COATED ORAL at 08:01

## 2023-09-02 RX ADMIN — CLONAZEPAM 4 MG: 1 TABLET ORAL at 22:05

## 2023-09-02 RX ADMIN — CIPROFLOXACIN 500 MG: 250 TABLET, FILM COATED ORAL at 22:04

## 2023-09-02 RX ADMIN — AMITRIPTYLINE HYDROCHLORIDE 100 MG: 50 TABLET, FILM COATED ORAL at 22:05

## 2023-09-02 RX ADMIN — LEVOTHYROXINE SODIUM 125 MCG: 0.12 TABLET ORAL at 06:27

## 2023-09-02 RX ADMIN — CARVEDILOL 6.25 MG: 3.12 TABLET, FILM COATED ORAL at 17:02

## 2023-09-02 RX ADMIN — CARVEDILOL 6.25 MG: 3.12 TABLET, FILM COATED ORAL at 08:01

## 2023-09-02 RX ADMIN — OLANZAPINE 5 MG: 5 TABLET, FILM COATED ORAL at 22:05

## 2023-09-02 ASSESSMENT — PAIN SCALES - GENERAL: PAINLEVEL_OUTOF10: 0

## 2023-09-02 NOTE — PROGRESS NOTES
Physical Therapy  PHYSICAL THERAPY DAILY NOTE  Time In:  843   Time Out:  910  Total Treatment Time:  27 Minutes  Pt. Seen for: PM, Balance Training, Gait Training, Patient Education, and Transfer Training     Subjective: Sometimes I get bladder spasms and I can't help it . Can I have some scrubs because I got my clothes all messed up. Pt reports he would like a leg bag for his carr during the day. --RN informed         Objective:  Awake in bed. Asking for scrubs to dre. Precautions: Falls, Poor Safety Awareness, and Impulsive     GROSS ASSESSMENT Daily Assessment   Seated balance and range of motion for occ contact assist for donning scrub shirt and pants        COGNITION Daily Assessment    Orientation:A&O x 3  Communication:able to express needs, able to follow one step commands without assistance  Social Interaction:cooperative, appropriate with PT, participating, motivated to improve  Memory:appears intact        BED/MAT MOBILITY Daily Assessment    Rolling Right: Modified Independent  Rolling Left: Modified Independent  Supine to Sit: CGA  Sit to Supine: CGA       TRANSFERS Daily Assessment   Sit/stand from bed, occ pt pushes back of legs against bed to rise and maintain balance upon initial standing especially pulling pants up. Walking through out room with rolling walker managing personal items and closet, bending for lower drawer. Looking for some particular clothes.  Sit to Stand: CGA  Stand to Sit: CGA/min  Transfer Type: Stand Pivot  Transfer Assistance: CGA  Car Transfers: Min A         GAIT Daily Assessment   Wide LONG, cues for heel strike and increased step height Amount of Assistance: CGA  Distance (ft): 500  Assistive Device: RW  Surface: Level Surface       STEPS/STAIRS Daily Assessment    Steps Ambulated:  4,   Level of Assistance:  CGA  Railing:Single Railing on L  Assistive Device: n/a       BALANCE Daily Assessment      Static Sitting: Good:  Pt. able to maintain balance w/o UE

## 2023-09-02 NOTE — PROGRESS NOTES
Occupational Therapy    OT DAILY NOTE    Time In:    9:42 am  Time Out: 10:42 am    Subjective: Pt. was pleasant, cooperative, and agreeable to OT treatment session. Pain: 5/10 back  Education: Pt. Educated on safe transfer techniques when performing shower transfers this date. Interdisciplinary Communication: PT educated on pt. Progress during OT treatment session. Functional Mobility   Score Comments   Sit to Stand 4: Supervision or touching A CGA with use of RW   Transfer Assist 4: Supervision or touching A CGA with use of RW     Activities of Daily Living   Score Comments   Bathing 4: Supervision or touching A  UB: SBA; LB CGA when standing; sitting in shower chair   Upper Body  Dressing 5: S/U or clean-up assistance  S/U   Lower Body Dressing 4: Supervision or touching A  CGA when standing; donning brief and pants   Donning/[employer] 6: Independent  I; donning B socks       Strengthening   Pt. Performed BUE strengthening exercises with yellow theraband (1x15) in multiple planes in order to address BUE shoulder/forearm ROM in order to improve BUE strength, ROM, functional use, and activity tolerance in order for carry over with self-care ADLs, functional transfers, and desired functional activities involving BUE. Assessment: Pt. Was agreeable to OT treatment session. Pt. Performed ADL as reflected in chart above and therapeutic exercises as summarized above with 5/10 back pain. Pt. Demonstrated fair- activity tolerance throughout OT treatment session. Pt. Ovidio Arringtone back to room and into recliner chair with call light and all needs within reach. Plan: Continue OT treatment in order to achieve goals in preparation for anticipated d/c disposition.     Cayetano Blackburn OT   09/02/2023

## 2023-09-03 PROBLEM — Z74.09 IMPAIRED FUNCTIONAL MOBILITY, BALANCE, GAIT, AND ENDURANCE: Status: ACTIVE | Noted: 2023-09-03

## 2023-09-03 PROBLEM — D64.9 ANEMIA: Status: ACTIVE | Noted: 2019-06-13

## 2023-09-03 PROBLEM — Z51.89 ENCOUNTER FOR REHABILITATION: Status: ACTIVE | Noted: 2023-09-01

## 2023-09-03 PROBLEM — A41.9 SEPSIS (HCC): Status: RESOLVED | Noted: 2023-08-29 | Resolved: 2023-09-03

## 2023-09-03 PROCEDURE — 6370000000 HC RX 637 (ALT 250 FOR IP): Performed by: PHYSICIAN ASSISTANT

## 2023-09-03 PROCEDURE — 6370000000 HC RX 637 (ALT 250 FOR IP): Performed by: PHYSICAL MEDICINE & REHABILITATION

## 2023-09-03 PROCEDURE — 1180000000 HC REHAB R&B

## 2023-09-03 PROCEDURE — 99232 SBSQ HOSP IP/OBS MODERATE 35: CPT | Performed by: PHYSICIAN ASSISTANT

## 2023-09-03 RX ORDER — NITROFURANTOIN MACROCRYSTALS 50 MG/1
50 CAPSULE ORAL EVERY 6 HOURS SCHEDULED
Status: DISCONTINUED | OUTPATIENT
Start: 2023-09-03 | End: 2023-09-08 | Stop reason: HOSPADM

## 2023-09-03 RX ADMIN — CARVEDILOL 6.25 MG: 3.12 TABLET, FILM COATED ORAL at 17:08

## 2023-09-03 RX ADMIN — NITROFURANTOIN 50 MG: 50 CAPSULE ORAL at 17:09

## 2023-09-03 RX ADMIN — CIPROFLOXACIN 500 MG: 250 TABLET, FILM COATED ORAL at 21:57

## 2023-09-03 RX ADMIN — CARVEDILOL 6.25 MG: 3.12 TABLET, FILM COATED ORAL at 08:22

## 2023-09-03 RX ADMIN — NITROFURANTOIN 50 MG: 50 CAPSULE ORAL at 11:49

## 2023-09-03 RX ADMIN — OLANZAPINE 5 MG: 5 TABLET, FILM COATED ORAL at 21:57

## 2023-09-03 RX ADMIN — AMITRIPTYLINE HYDROCHLORIDE 100 MG: 50 TABLET, FILM COATED ORAL at 21:57

## 2023-09-03 RX ADMIN — CIPROFLOXACIN 500 MG: 250 TABLET, FILM COATED ORAL at 08:22

## 2023-09-03 RX ADMIN — LEVOTHYROXINE SODIUM 125 MCG: 0.12 TABLET ORAL at 06:07

## 2023-09-03 RX ADMIN — CLONAZEPAM 4 MG: 1 TABLET ORAL at 21:58

## 2023-09-03 RX ADMIN — NITROFURANTOIN 50 MG: 50 CAPSULE ORAL at 23:47

## 2023-09-03 ASSESSMENT — PAIN SCALES - GENERAL: PAINLEVEL_OUTOF10: 0

## 2023-09-03 NOTE — ED TRIAGE NOTES
[address] [place] [address]  [phone]          Hospitalist Discharge Summary     Patient ID:  Diann Arrieta  [id number]  46 y.o.  1975    Admit date: 9/1/2023    Discharge date and time: 9/3/2023 9:51 AM    Admission Diagnoses: Long term (current) use of anticoagulants [Z79.01]  Rectal bleeding [K62.5]  SIRS (systemic inflammatory response syndrome) (HCC) [R65.10]  Cellulitis of right leg [P57.954]  Supratherapeutic INR [R79.1]  Cellulitis of left lower leg [L03.116]  Fever, unspecified fever cause [R50.9]    Discharge Diagnoses:  Principal Diagnosis Cellulitis of left lower leg                                            Principal Problem:    Cellulitis of left lower leg  Resolved Problems:    * No resolved hospital problems. *         Hospital Course:     47 yo hx of HTN, DM, mechanical AVR, PE/DVT, metastatic testicular CA in remission, presented w/ rectal bleeding from hemorrhoids, supratherapeutic INR, RLE cellulitis    1) Rectal bleeding: now resolved. Only had 1 episode. Hgb stable. Likely related to hemorrhoids and supratherapeutic INR. Instructed patient to return if worsens     2) RLE cellulitis: sepsis POA, now resolved with IV Vanc/cefepime. Will complete a course of Doxy/omnicef      3) Supratherapeutic INR: INR was 3.8 on admission, now back to 2.0.  will monitor     4) Mechanical AVR: cont coumadin, goal INR 2-3. Follow up with PCP    5) Hx of PT/DVT: hx of thrombectomy. Cont coumadin    6) HTN: cont metoprolol    7) DM type 2: A1C 7.3%.   cont Ozempic, glipizide    8) Hx of metastatic testicular germ cell cancer: in remission      PCP: Sanjuanita Rivera MD     Consults: none    Significant Diagnostic Studies: none    Discharge Exam:  Physical Exam:    Gen: Well-developed, well-nourished, morbidly obese, in no acute distress  HEENT:  Pink conjunctivae, PERRL, hearing intact to voice, moist mucous membranes  Neck: Supple, without Pt brought in by ems from home complaint of bilateral leg weakness since yesterday. No known deficits, falls, pain or trauma, seen here 2 days ago for same complaint.    A&Ox4, ambulatory at the scene     (-)numbness/tingling

## 2023-09-04 PROCEDURE — 97116 GAIT TRAINING THERAPY: CPT

## 2023-09-04 PROCEDURE — 6370000000 HC RX 637 (ALT 250 FOR IP): Performed by: PHYSICAL MEDICINE & REHABILITATION

## 2023-09-04 PROCEDURE — 97150 GROUP THERAPEUTIC PROCEDURES: CPT

## 2023-09-04 PROCEDURE — 1180000000 HC REHAB R&B

## 2023-09-04 PROCEDURE — 97110 THERAPEUTIC EXERCISES: CPT

## 2023-09-04 PROCEDURE — 99232 SBSQ HOSP IP/OBS MODERATE 35: CPT | Performed by: PHYSICIAN ASSISTANT

## 2023-09-04 PROCEDURE — 97535 SELF CARE MNGMENT TRAINING: CPT

## 2023-09-04 PROCEDURE — 6370000000 HC RX 637 (ALT 250 FOR IP): Performed by: PHYSICIAN ASSISTANT

## 2023-09-04 RX ADMIN — CARVEDILOL 6.25 MG: 3.12 TABLET, FILM COATED ORAL at 08:29

## 2023-09-04 RX ADMIN — CIPROFLOXACIN 500 MG: 250 TABLET, FILM COATED ORAL at 21:55

## 2023-09-04 RX ADMIN — CLONAZEPAM 4 MG: 1 TABLET ORAL at 21:58

## 2023-09-04 RX ADMIN — OLANZAPINE 5 MG: 5 TABLET, FILM COATED ORAL at 21:55

## 2023-09-04 RX ADMIN — NITROFURANTOIN 50 MG: 50 CAPSULE ORAL at 23:50

## 2023-09-04 RX ADMIN — NITROFURANTOIN 50 MG: 50 CAPSULE ORAL at 17:02

## 2023-09-04 RX ADMIN — CIPROFLOXACIN 500 MG: 250 TABLET, FILM COATED ORAL at 08:29

## 2023-09-04 RX ADMIN — AMITRIPTYLINE HYDROCHLORIDE 100 MG: 50 TABLET, FILM COATED ORAL at 21:55

## 2023-09-04 RX ADMIN — CARVEDILOL 6.25 MG: 3.12 TABLET, FILM COATED ORAL at 17:02

## 2023-09-04 RX ADMIN — NITROFURANTOIN 50 MG: 50 CAPSULE ORAL at 06:30

## 2023-09-04 RX ADMIN — NITROFURANTOIN 50 MG: 50 CAPSULE ORAL at 12:03

## 2023-09-04 RX ADMIN — LEVOTHYROXINE SODIUM 125 MCG: 0.12 TABLET ORAL at 06:38

## 2023-09-04 ASSESSMENT — PAIN SCALES - GENERAL: PAINLEVEL_OUTOF10: 0

## 2023-09-04 NOTE — PROGRESS NOTES
PHYSICAL THERAPY DAILY NOTE  Time In:  0949  Time Out:  1345  Total Treatment Time:  40 Minutes  Pt. Seen for: PM, Gait Training, Patient Education, Therapeutic Exercise, and Transfer Training     Subjective: Pt reports he is not ready to go home. He would like to be consistently able to walk on his own without the walker and have the stamina to care for himself before discharge. Pt states he does not want to eat more because he has to change his colostomy more and he doesn't like to do that. Objective:  Precautions: Falls, John, Impulsive , and colostomy    GROSS ASSESSMENT Daily Assessment    Pt wearing leg bag at start of session. Up in w/c with shane cushion which he finds comfortable. Pt had been provided a foam crate cushion and requested to use it in recliner at end of session. COGNITION Daily Assessment    Orientation:A&O x 3  Communication:able to express needs, able to follow one step commands with repeated cues at times  Social Interaction:cooperative, appropriate with PT, participating, motivated to improve  Memory:appears intact        BED/MAT MOBILITY Daily Assessment    Rolling Right: NT  Rolling Left: NT  Supine to Sit: NT  Sit to Supine: NT       TRANSFERS Daily Assessment   Supervision with cues for technique for safe transfers. Pt pushes legs against mat to stand, encouraged use of UE to push up, using RW Sit to Stand: sup  Stand to Sit: Supervision/Standby Assist  Transfer Type: Stand Pivot  Transfer Assistance: Supervision/Standby Assist  Car Transfers: NT         GAIT Daily Assessment   Improved speed of gait and foot clearance without shoes, cues for heel strike. Performed sidestepping x 25ft L/R with sba without AD, amb on uneven path x2 reps with sba without AD.  Amount of Assistance: Supervision/Standby Assist  Distance (ft): 2 x 250ft  Assistive Device: RW and no AD  Surface: Level Surface and unlevel       STEPS/STAIRS Daily Assessment   Pt amb up/down 4 steps forward with

## 2023-09-04 NOTE — PROGRESS NOTES
hour(s)). Assessment: This 69yo WM with PMH including rectal cancer s/p colostomy, chronic indwelling Carr with recurrent UTIs (s/p right ureteral stent placement q6mo), severe protein-calorie malnutrition, chronic B LE weakness and MDD was admitted to [facility] 8/29/2023 with sepsis from Pseudomonas + Enterococcus UTI and worsening B LE weakness. He has significant mobility and self-care deficits. Rehabilitation Plan:  Continue PT for a minimum of 1.5 hours a day, at least 5 out of 7 days per week to address bed mobility, transfers, ambulation, strengthening, balance, and endurance. Continue OT for a minimum of 1.5 hours a day, at least 5 out of 7 days per week to address ADL (feeding, grooming, bathing, UE and LE dressing, toileting); instrumental ADLs; cognitive function; safety; energy conservations; community reintegration; and adaptive equipment as needed. The patient will also require 24-hour skilled rehabilitation nursing for bowel and bladder management, skin care for decubitus ulcer prevention, pain management and ongoing medication administration        Plan / Recommendations / Medical Decision Making:     Below are the active medical comorbidities / hospital conditions which will affect rehab course with plan for mitigation. Continue daily physician / PA medical management:    Physical debility - interdisciplinary approach to rehabilitation including PT, OT, nursing and physiatry and disease specific education. UTI, Pseudomonas + Enterococcus - preliminary Pseudomonas culture sensitive to ciprofloxacin, started 7d course. E faecalis susceptible to nitrofurantoin, pharmacy recs: 50mg q6h x 7d. Carr catheter with recurrent UTIs, chronic - changed monthly, carr last changed on 8/25. S/p right ureteral stent placement due to bladder injury- stent changed q6mo, last changed on 6/29. Rectal cancer s/p colostomy - patient performs ostomy care at home.      Severe protein-calorie malnutrition - offer nutritional supplements at each meal. Requested double portions each meal from kitchen. Chronic LE weakness - PT / OT. Hypothyroidism - on Synthroid. DVT prophylaxis - ambulating sufficiently, will d/c Lovenox Monday (?). Time spent was 35 minutes with over 1/2 in direct patient care / examination, consultation and coordination of care.      Signed By: GENIA Villegas     September 4, 2023

## 2023-09-05 ENCOUNTER — CARE COORDINATION (OUTPATIENT)
Dept: CARE COORDINATION | Facility: CLINIC | Age: 78
End: 2023-09-05

## 2023-09-05 PROCEDURE — 1180000000 HC REHAB R&B

## 2023-09-05 PROCEDURE — 6370000000 HC RX 637 (ALT 250 FOR IP): Performed by: PHYSICAL MEDICINE & REHABILITATION

## 2023-09-05 PROCEDURE — 97530 THERAPEUTIC ACTIVITIES: CPT

## 2023-09-05 PROCEDURE — 97110 THERAPEUTIC EXERCISES: CPT

## 2023-09-05 PROCEDURE — 97116 GAIT TRAINING THERAPY: CPT

## 2023-09-05 PROCEDURE — 97535 SELF CARE MNGMENT TRAINING: CPT

## 2023-09-05 PROCEDURE — 6370000000 HC RX 637 (ALT 250 FOR IP): Performed by: PHYSICIAN ASSISTANT

## 2023-09-05 RX ADMIN — CLONAZEPAM 4 MG: 1 TABLET ORAL at 21:46

## 2023-09-05 RX ADMIN — AMITRIPTYLINE HYDROCHLORIDE 100 MG: 50 TABLET, FILM COATED ORAL at 21:46

## 2023-09-05 RX ADMIN — CARVEDILOL 6.25 MG: 3.12 TABLET, FILM COATED ORAL at 08:24

## 2023-09-05 RX ADMIN — NITROFURANTOIN 50 MG: 50 CAPSULE ORAL at 06:36

## 2023-09-05 RX ADMIN — CARVEDILOL 6.25 MG: 3.12 TABLET, FILM COATED ORAL at 17:14

## 2023-09-05 RX ADMIN — OLANZAPINE 5 MG: 5 TABLET, FILM COATED ORAL at 21:46

## 2023-09-05 RX ADMIN — NITROFURANTOIN 50 MG: 50 CAPSULE ORAL at 12:13

## 2023-09-05 RX ADMIN — CIPROFLOXACIN 500 MG: 250 TABLET, FILM COATED ORAL at 08:24

## 2023-09-05 RX ADMIN — LEVOTHYROXINE SODIUM 125 MCG: 0.12 TABLET ORAL at 06:36

## 2023-09-05 RX ADMIN — CIPROFLOXACIN 500 MG: 250 TABLET, FILM COATED ORAL at 21:46

## 2023-09-05 RX ADMIN — NITROFURANTOIN 50 MG: 50 CAPSULE ORAL at 17:14

## 2023-09-05 NOTE — PROGRESS NOTES
PHYSICAL THERAPY DAILY NOTE  Time In:  1119  Time Out:  1159  Total Treatment Time:  40 Minutes  Pt. Seen for: PM, Balance Training, Gait Training, Patient Education, and Therapeutic Exercise     Subjective: Pt reports that he needs to be walking around on his own before he leaves. He is adamant this is required. Objective:  Precautions: Falls, Poor Safety Awareness, and colostomy, carr leg bag    GROSS ASSESSMENT Daily Assessment    Pt in bed sleeping but arousable. COGNITION Daily Assessment    Orientation:A&O x 3  Communication:able to express needs, able to follow one step commands without assistance  Social Interaction:cooperative, appropriate with PT, participating, motivated to improve  Memory:appears intact        BED/MAT MOBILITY Daily Assessment   able to get legs in bed but maintains frog position.  Instructed pt to straighten his legs and stretch them into extension and was able Rolling Right: sup  Rolling Left: sup  Supine to Sit: mod ind  Sit to Supine: Modified Independent       TRANSFERS Daily Assessment   Stand pivot with/without AD Sit to Stand: Supervision/Standby Assist  Stand to Sit: Modified Independent  Transfer Type: Stand Pivot   Transfer Assistance: Supervision/Standby Assist  Car Transfers: NT         GAIT Daily Assessment   Pt amb without AD  carrying objects in both hands, no LOB Amount of Assistance: Supervision/Standby Assist  Distance (ft): 200-400  Assistive Device: RW and No A/D  Surface: Level Surface       STEPS/STAIRS Daily Assessment   N/a        BALANCE Daily Assessment    Static Sitting: Normal:  Pt. able to maintain balance w/o UE support  Dynamic Sitting: Normal - accepts maximal challenge & can shift weight easily fully in all directions  Static Standing: Normal:  Pt. able to maintain balance w/o UE support  Dynamic Standing: Good - accepts moderate challenge;  can maintain balance while picking object off the floor       WHEELCHAIR MOBILITY Daily Assessment Able to Propel (ft): n/a  Assistance:   Surface:   Wheelchair Set-up:        LOWER EXTREMITY EXERCISES Daily Assessment    Performed passive stretch to IT band in SL, performed active resistance stretch, clams 2 x 10 ea  Hooklying ball squeeze with manual cues 2 x 10  Bridging with cues for adduction 2 x 10     Pain level: 0  Pain Location:    Pain Interventions:     Education:  Pt instructed in bed mobility, transfers, gait, balance training, postural awareness, HEP    Interdisciplinary Communication: Collaborated w/ OT and care team regarding pt's progress. Pt returned to bed at end of session with call light and tray table in reach. Cushion was modified with less pressure on sacrum            Assessment: Pt demonstrates good mobility and gait with RW but unsteady without. Plan of Care: Continue with POC and progress as tolerated.       AKASH LIVE, PT  9/5/2023

## 2023-09-05 NOTE — PROGRESS NOTES
RW x 400ft with mod ind Amount of Assistance: Supervision/Standby Assist  Distance (ft): 200-400  Assistive Device: RW and No A/D  Surface: Level Surface       STEPS/STAIRS Daily Assessment   4 steps, 3x, needed cues for descending to maintain sidestep technique for safety. Amb up stairs step over step with single rail support Steps Ambulated:  12  Level of Assistance:  CGA  Railing:Single Railing on L  Assistive Device: No A/D       BALANCE Daily Assessment   LOB with sidestepping L/R, amb with high knees, amb backward and start/stop. Static Sitting: Normal:  Pt. able to maintain balance w/o UE support  Dynamic Sitting: Normal - accepts maximal challenge & can shift weight easily fully in all directions  Static Standing: Normal:  Pt. able to maintain balance w/o UE support  Dynamic Standing: Good - accepts moderate challenge;  can maintain balance while picking object off the floor       WHEELCHAIR MOBILITY Daily Assessment    Able to Propel (ft): n/a  Assistance:   Surface:   Wheelchair Set-up:        LOWER EXTREMITY EXERCISES Daily Assessment    Balance activities as above     Pain level: 0  Pain Location:    Pain Interventions:     Education:  Pt instructed in bed mobility, transfers, gait, balance training, postural awareness,    Interdisciplinary Communication: Collaborated w/ OT and care team regarding pt's progress. Pt returned to bed at end of session with call light and tray table in reach. Cushion was modified with less pressure on sacrum            Assessment: Pt demonstrates good mobility and gait with RW but unsteady without. Plan of Care: Continue with POC and progress as tolerated.       AKASH LIVE, PT  9/5/2023

## 2023-09-05 NOTE — CARE COORDINATION
Transition of care outreach postponed for 7 days due to patient's discharge to [facility] rehab facility.

## 2023-09-06 PROCEDURE — 97530 THERAPEUTIC ACTIVITIES: CPT

## 2023-09-06 PROCEDURE — 6370000000 HC RX 637 (ALT 250 FOR IP): Performed by: PHYSICIAN ASSISTANT

## 2023-09-06 PROCEDURE — 1180000000 HC REHAB R&B

## 2023-09-06 PROCEDURE — 97110 THERAPEUTIC EXERCISES: CPT

## 2023-09-06 PROCEDURE — 99232 SBSQ HOSP IP/OBS MODERATE 35: CPT | Performed by: PHYSICAL MEDICINE & REHABILITATION

## 2023-09-06 PROCEDURE — 6370000000 HC RX 637 (ALT 250 FOR IP): Performed by: PHYSICAL MEDICINE & REHABILITATION

## 2023-09-06 PROCEDURE — 97116 GAIT TRAINING THERAPY: CPT

## 2023-09-06 RX ADMIN — LEVOTHYROXINE SODIUM 125 MCG: 0.12 TABLET ORAL at 05:23

## 2023-09-06 RX ADMIN — NITROFURANTOIN 50 MG: 50 CAPSULE ORAL at 00:27

## 2023-09-06 RX ADMIN — CIPROFLOXACIN 500 MG: 250 TABLET, FILM COATED ORAL at 07:44

## 2023-09-06 RX ADMIN — NITROFURANTOIN 50 MG: 50 CAPSULE ORAL at 11:59

## 2023-09-06 RX ADMIN — NITROFURANTOIN 50 MG: 50 CAPSULE ORAL at 05:23

## 2023-09-06 RX ADMIN — CARVEDILOL 6.25 MG: 3.12 TABLET, FILM COATED ORAL at 07:44

## 2023-09-06 RX ADMIN — CLONAZEPAM 4 MG: 1 TABLET ORAL at 21:29

## 2023-09-06 RX ADMIN — CARVEDILOL 6.25 MG: 3.12 TABLET, FILM COATED ORAL at 17:04

## 2023-09-06 RX ADMIN — AMITRIPTYLINE HYDROCHLORIDE 100 MG: 50 TABLET, FILM COATED ORAL at 21:33

## 2023-09-06 RX ADMIN — OLANZAPINE 5 MG: 5 TABLET, FILM COATED ORAL at 21:32

## 2023-09-06 RX ADMIN — NITROFURANTOIN 50 MG: 50 CAPSULE ORAL at 17:04

## 2023-09-06 RX ADMIN — CIPROFLOXACIN 500 MG: 250 TABLET, FILM COATED ORAL at 21:36

## 2023-09-06 NOTE — PROGRESS NOTES
PHYSICAL THERAPY DAILY NOTE  Time In:  1304  Time Out:  1345  Total Treatment Time:  41 Minutes  Pt. Seen for: PM, Balance Training, Gait Training, and Transfer Training     Subjective: Pt reports he will be able to do the stairs just fine at home. He wants to walk without the walker but understands that he is not allowed at the hospital for safety. He recognizes that he was unsteady yesterday with dynamic standing activities. Objective:  Precautions: Falls, John, and Poor Safety Awareness    GROSS ASSESSMENT Daily Assessment    Reviewed safety and need for RW in room to amb on his own.         COGNITION Daily Assessment    Orientation:A&O x 3  Communication:able to express needs, able to follow one step commands without assistance  Social Interaction:cooperative, appropriate with PT, participating, motivated to improve  Memory:appears impaired as he did not recall prior conversation regarding safety        BED/MAT MOBILITY Daily Assessment   N/a Rolling Right: NT  Rolling Left: NT  Supine to Sit: NT  Sit to Supine: NT       TRANSFERS Daily Assessment   Sit stand from recliner with mod ind  Kneeling on foam pad to standing with sup x 5 reps Sit to Stand: mod ind  Stand to Sit: mod ind  Transfer Type: Stand Pivot  Transfer Assistance: Modified Independent  Car Transfers: NT         GAIT Daily Assessment   Instructed that if he is alone, he will need RW for safety  Performed 180 degree turns, stop/start Amount of Assistance: Supervision/Standby Assist  Distance (ft): 2 x 400  Assistive Device: No A/D  Surface: Level Surface       STEPS/STAIRS Daily Assessment   Discussed only Steps Ambulated:  n/a  Level of Assistance:    Railing:  Assistive Device:        BALANCE Daily Assessment   Able to  cones from floor and replace on floor, stood on foam pad for LE/UE TE   Static Sitting: Normal:  Pt. able to maintain balance w/o UE support  Dynamic Sitting: Normal - accepts maximal challenge & can shift weight easily fully in all directions  Static Standing: Normal:  Pt. able to maintain balance w/o UE support  Dynamic Standing: Good - accepts moderate challenge;  can maintain balance while picking object off the floor       WHEELCHAIR MOBILITY Daily Assessment    Able to Propel (ft): n/a  Assistance:   Surface:   Wheelchair Set-up:        LOWER EXTREMITY EXERCISES Daily Assessment    As above     Pain level: 0  Pain Location:    Pain Interventions:       Education:  Pt instructed in  transfers, gait, balance training, postural awareness, safety. Provided green socks and instructed to amb in room with RW only    Interdisciplinary Communication: Collaborated w/ OT and care team regarding pt's progress at rounds    Patient up in recliner in room at end of session with call light and tray table in reach. Assessment: Pt demonstrates improved gait stability with start/stop. Plan of Care: Continue with POC and progress as tolerated.       AKASH LIVE, PT  9/6/2023

## 2023-09-06 NOTE — PROGRESS NOTES
Comprehensive Nutrition Assessment    Type and Reason for Visit: Initial, Positive Nutrition Screen  Best Practice Alert for BMI <18.5    Nutrition Recommendations/Plan:   Meals and Snacks:  Diet: Continue current order, double portions with all meals  Nutrition Supplement Therapy:  Medical food supplement therapy:  Continue Ensure High Protein three times per day (this provides 160 kcal and 16 grams protein per bottle)     Malnutrition Assessment:  Malnutrition Status: Insufficient data (recent malnutriton diagnosis, NFPE deferred, no recent weight, meeting >90% needs)    Deferred as patient eating lunch  Nutrition Assessment:  Nutrition History: Pt with hx of TPN 2019 following bowel surgeries. Nutrition hx per RD note 11/2022: \"Pt reports variable intake/appetite PTA- typically consumes 2 meals/day + snacks. Pt reports lowest weight of ~115lb prior to recent STR admission however states he gained 8-10lb during STR and now weighs ~123-125lb. Pt states prior to rectal cancer dx and colostomy in 2013 he typically weighed 155lb, reports prior active lifestyle. Pt reports prior intake of ONS in STR but none at home. \"    SFD encounter 8/30 pt relates he continues with 2 meals per day am meal generally 2 packets of grits w 2 eggs and ground turkey, eats out one meal day last example 6 wings and 5-6 shrimps. In addition to this he consumes one serving of 25 gram whey protein mixed in water. He elects not to eat after 3pm secondary to concerns of volume of colostomy output and not wanting to make a mess while sleeping. Patient eating well per report during [facility] stay. Requesting double portions and accepting of Ensure High Protein. Do You Have Any Cultural, Uatsdin, or Ethnic Food Preferences?: No   Nutrition Background:       Patient with PMH significant for rectal cancer s/p colostomy, chronic catheter with recurrent UTIs, right ureteral stent, severe PCM, chronic LE weakness. Recent admit to [facility] with sepsis.

## 2023-09-07 PROCEDURE — 97530 THERAPEUTIC ACTIVITIES: CPT

## 2023-09-07 PROCEDURE — 97110 THERAPEUTIC EXERCISES: CPT

## 2023-09-07 PROCEDURE — 6370000000 HC RX 637 (ALT 250 FOR IP): Performed by: PHYSICAL MEDICINE & REHABILITATION

## 2023-09-07 PROCEDURE — 6370000000 HC RX 637 (ALT 250 FOR IP): Performed by: PHYSICIAN ASSISTANT

## 2023-09-07 PROCEDURE — 97535 SELF CARE MNGMENT TRAINING: CPT

## 2023-09-07 PROCEDURE — 99232 SBSQ HOSP IP/OBS MODERATE 35: CPT | Performed by: PHYSICIAN ASSISTANT

## 2023-09-07 PROCEDURE — 1180000000 HC REHAB R&B

## 2023-09-07 PROCEDURE — 97116 GAIT TRAINING THERAPY: CPT

## 2023-09-07 RX ORDER — NITROFURANTOIN MACROCRYSTALS 50 MG/1
50 CAPSULE ORAL 4 TIMES DAILY
Qty: 8 CAPSULE | Refills: 0 | Status: SHIPPED | OUTPATIENT
Start: 2023-09-08 | End: 2023-09-10

## 2023-09-07 RX ADMIN — CARVEDILOL 6.25 MG: 3.12 TABLET, FILM COATED ORAL at 17:18

## 2023-09-07 RX ADMIN — CARVEDILOL 6.25 MG: 3.12 TABLET, FILM COATED ORAL at 07:35

## 2023-09-07 RX ADMIN — OLANZAPINE 5 MG: 5 TABLET, FILM COATED ORAL at 21:32

## 2023-09-07 RX ADMIN — NITROFURANTOIN 50 MG: 50 CAPSULE ORAL at 12:57

## 2023-09-07 RX ADMIN — CIPROFLOXACIN 500 MG: 250 TABLET, FILM COATED ORAL at 21:32

## 2023-09-07 RX ADMIN — NITROFURANTOIN 50 MG: 50 CAPSULE ORAL at 17:18

## 2023-09-07 RX ADMIN — CLONAZEPAM 4 MG: 1 TABLET ORAL at 22:40

## 2023-09-07 RX ADMIN — LEVOTHYROXINE SODIUM 125 MCG: 0.12 TABLET ORAL at 06:22

## 2023-09-07 RX ADMIN — AMITRIPTYLINE HYDROCHLORIDE 100 MG: 50 TABLET, FILM COATED ORAL at 22:10

## 2023-09-07 RX ADMIN — CIPROFLOXACIN 500 MG: 250 TABLET, FILM COATED ORAL at 07:35

## 2023-09-07 RX ADMIN — NITROFURANTOIN 50 MG: 50 CAPSULE ORAL at 00:00

## 2023-09-07 RX ADMIN — NITROFURANTOIN 50 MG: 50 CAPSULE ORAL at 06:23

## 2023-09-07 NOTE — CARE COORDINATION
Interdisciplinary Wednesday, Team Conference Meeting Notes    Interdisciplinary team conference meeting completed to discuss plan of care. Estimated D/C Date: 9/8/23    Recommended Follow-Up Therapy: Staff has recommended HH (PT/OT/RN/SW/Aide), No DME's. Patient has been provided GREEN SOCKS. Communication with family/caregivers:  Patient needs are continue to be followed by Dr. Damon Batres.  Patient has no discharge date / plan at this time scheduled for 9/8/23. CM met with patient and made patient aware of the recommendations from staff. CM made patient aware that staff has recommended [facility] with PT/OT/RN/SW/Aide, patient was agreeable and requested that a referral be made to [facility]. Referral completed. CM discussed Meals on Wheals and patient declined. CM will continue to follow patient:      Name of Ins: Medicare   Policy#: [id number]  Auth#: no auth needed  Admitting Date: 9/1/23  LOS: 5 days   D/C Date: 9/8/23  Contact: no contact needed  Fax: no fax needed  Updated Clinicals Faxed: no updated needed    CM met with patient and discussed the recommendations from staff and patient was agreeable to the recommendations except Meals on Wheels. Referral completed to [facility]. CM will continue to follow and remain available for any needs, concerns or questions that may arise.

## 2023-09-07 NOTE — PROGRESS NOTES
Patient pulled off ostomy wafer and appliance. No prior problem with leaking . Patient demonstrated proper prep and appliance of the wafer and attaching bag and clip.

## 2023-09-07 NOTE — CARE COORDINATION
09/07/23 1300   Service Assessment   Patient Orientation Alert and Oriented;Person;Place;Situation;Self   Cognition Alert   History Provided By Medical Record; Patient   Primary Caregiver Self   Support Systems Family Members   Patient's Healthcare Decision Maker is: Named in [facility] Lam   PCP Verified by CM Yes   Last Visit to PCP Within last 3 months   Prior Functional Level Independent in ADLs/IADLs   Current Functional Level Assistance with the following:;Mobility; Bathing;Dressing; Toileting   Can patient return to prior living arrangement Yes   Ability to make needs known: Good   Family able to assist with home care needs: Yes   Would you like for me to discuss the discharge plan with any other family members/significant others, and if so, who? No   Financial Resources Medicare   Community Resources None   Social/Functional History   Lives With Alone   Type of [address] [place] [address] One level   Home Access Level entry   [address] [facility] [address]   [address] [facility] None   Receives Help From Family   ADL Assistance Needs assistance   Toileting Needs assistance   [address] [facility] Needs assistance   Homemaking Responsibilities Yes   Ambulation Assistance Needs assistance   Transfer Assistance Needs assistance   Active  Yes   Mode of Transportation Car   Occupation Retired   Discharge Planning   Type of [address] Prior To Admission None   Potential Assistance Needed N/A   DME Ordered? No   Potential Assistance Purchasing Medications No   Type of Home Care Services None   Patient expects to be discharged to: House   One/Two Story Residence One story   Lift Chair Available No   History of falls?  0   Services At/After Discharge   Transition of Care Consult (CM Consult) Discharge Planning   Services [address] [facility] Discharge Outpatient;PT;OT   [employer]

## 2023-09-08 ENCOUNTER — HOME HEALTH ADMISSION (OUTPATIENT)
Dept: HOME HEALTH SERVICES | Facility: HOME HEALTH | Age: 78
End: 2023-09-08

## 2023-09-08 VITALS
SYSTOLIC BLOOD PRESSURE: 126 MMHG | HEART RATE: 95 BPM | TEMPERATURE: 97.3 F | HEIGHT: 68 IN | DIASTOLIC BLOOD PRESSURE: 83 MMHG | WEIGHT: 114 LBS | OXYGEN SATURATION: 99 % | BODY MASS INDEX: 17.28 KG/M2 | RESPIRATION RATE: 16 BRPM

## 2023-09-08 PROCEDURE — 6370000000 HC RX 637 (ALT 250 FOR IP): Performed by: PHYSICIAN ASSISTANT

## 2023-09-08 PROCEDURE — 99239 HOSP IP/OBS DSCHRG MGMT >30: CPT | Performed by: PHYSICIAN ASSISTANT

## 2023-09-08 PROCEDURE — 6370000000 HC RX 637 (ALT 250 FOR IP): Performed by: PHYSICAL MEDICINE & REHABILITATION

## 2023-09-08 RX ADMIN — CIPROFLOXACIN 500 MG: 250 TABLET, FILM COATED ORAL at 08:17

## 2023-09-08 RX ADMIN — HYOSCYAMINE SULFATE 125 MCG: 0.12 TABLET ORAL; SUBLINGUAL at 03:53

## 2023-09-08 RX ADMIN — NITROFURANTOIN 50 MG: 50 CAPSULE ORAL at 08:17

## 2023-09-08 RX ADMIN — NITROFURANTOIN 50 MG: 50 CAPSULE ORAL at 01:39

## 2023-09-08 RX ADMIN — CARVEDILOL 6.25 MG: 3.12 TABLET, FILM COATED ORAL at 08:17

## 2023-09-08 ASSESSMENT — PAIN SCALES - GENERAL: PAINLEVEL_OUTOF10: 0

## 2023-09-08 NOTE — PLAN OF CARE
Problem: Safety - Adult  Goal: Free from fall injury  Outcome: Completed  Flowsheets (Taken 9/7/2023 2000 by Tata Becerra RN)  Free From Fall Injury: Instruct family/caregiver on patient safety     Problem: Skin/Tissue Integrity  Goal: Absence of new skin breakdown  Description: 1. Monitor for areas of redness and/or skin breakdown  2. Assess vascular access sites hourly  3. Every 4-6 hours minimum:  Change oxygen saturation probe site  4. Every 4-6 hours:  If on nasal continuous positive airway pressure, respiratory therapy assess nares and determine need for appliance change or resting period.   Outcome: Completed

## 2023-09-08 NOTE — PROGRESS NOTES
Discharge instructions reviewed with patient. Prescriptions given for new medications and med info sheets provided for all new medications. Opportunity for questions provided. Patient voiced understanding of all discharge instructions. VSS, no needs stated at this time. Patient to arrange himself a ride home by a family member.

## 2023-09-08 NOTE — DISCHARGE SUMMARY
[facility]  Inpatient Rehab Program      PHYSICAL MEDICINE & REHABILITATION DISCHARGE SUMMARY     Date: 9/8/2023  Admission Date: 9/1/2023  Discharge Date: 9/8/2023    Primary Care Provider: Trevon Mcgee DO     Discharged Condition: stable    Principal Problem:    Physical debility  Active Problems:    Leg weakness, bilateral    Anemia    Decreased independence with activities of daily living    Hypothyroidism    Severe protein-calorie malnutrition (HCC)    Chronic indwelling John catheter    Primary hypertension    Pseudomonas urinary tract infection    Encounter for rehabilitation    Impaired functional mobility, balance, gait, and endurance  Resolved Problems:    Sepsis [facility])      Hospital Course:   Laine Seay is a 76 y.o. white male patient at [facility] who was admitted to [address] [place] [address] on 9/1/2023 by Ryley Culp MD of Physical Medicine and Rehab service with below-mentioned medical history. HPI: this patient is a 69yo WM with PMH including rectal cancer s/p colostomy, chronic John catheter with recurrent UTIs (s/p right ureteral stent placement due to bladder injury), MDD, severe protein-calorie malnutrition and chronic B LE weakness. He was admitted 8/29/2023 after 2 prior ED visits (8/25, 8/27) for sepsis from acute cystitis and progressive generalized weakness. BCx NGTD, preliminary UCx with Pseudomonas (sensitive to ciprofloxacin) and Enterococcus. CT urogram noted right ureteral stent in place, no hydronephrosis. Acute PT/OT noted mobility and self-care deficits and recommended IRC. [facility] admissions team determined the patient would benefit from comprehensive acute inpatient rehab with continued close medical supervision and management prior to returning home. On 9/1/2023, patient was discharged from [address] [place] [address] and admitted to [facility].  His progress is documented immediately below, and after successful completion of 400 (240 Mg) MG tablet Comments:   Reason for Stopping:         thiamine 100 MG tablet Comments:   Reason for Stopping:         mirtazapine (REMERON) 15 MG tablet Comments:   Reason for Stopping:           I have reviewed the patient's controlled substance prescription history as maintained in the [place] prescription drug monitoring program (PDMP) so that prescription(s) for controlled substance, if any provided, could be given. DISPOSITION: discharged home with continued skilled therapy. OUTPATIENT FOLLOW-UP:  Alisia Pandya DO  [address] [facility] [address]  [phone]  Schedule an appointment as soon as possible for a visit in 5-7 days for follow up with PCP after hospitalization and rehab for UTI / sepsis and generalized weakness (prefers morning appts)          Signed By: Jessica Diop PA-C    September 8, 2023        Time spent in patient discharge planning and coordination: 39 minutes.

## 2023-09-08 NOTE — DISCHARGE INSTRUCTIONS
nitrofurantoin  Pronunciation:  KATTY NASSAR toin  Brand:  Macrobid, Macrodantin  What is the most important information I should know about nitrofurantoin? You should not take nitrofurantoin if you have severe kidney disease, urination problems, or a history of jaundice or liver problems caused by nitrofurantoin. Do not take nitrofurantoin during late pregnancy (from 38 weeks through delivery). What is nitrofurantoin? Nitrofurantoin is an antibiotic that is used to treat urinary tract infections caused by bacteria. Nitrofurantoin may also be used for purposes not listed in this medication guide. What should I discuss with my healthcare provider before taking nitrofurantoin? You should not take nitrofurantoin if you are allergic to it, or if you have:  severe kidney disease;  urination problems (little or no urination); or  a history of jaundice or liver problems caused by taking nitrofurantoin. Do not take nitrofurantoin during late pregnancy (from 38 weeks through delivery). Tell your doctor if you have ever had:  kidney disease;  anemia;  diabetes;  an electrolyte imbalance or vitamin B deficiency;  glucose-6-phosphate dehydrogenase (G6PD) deficiency; or  any type of debilitating disease. You should not breastfeed a baby younger than 2 month old while you are taking nitrofurantoin. Nitrofurantoin should not be given to a child younger than 2 month old. How should I take nitrofurantoin? Follow all directions on your prescription label and read all medication guides or instruction sheets. Use the medicine exactly as directed. Take nitrofurantoin with food, even if you take it at bedtime. Shake the oral suspension (liquid) before you measure a dose. Use the dosing syringe provided, or use a medicine dose-measuring device (not a kitchen spoon). You may need to keep taking nitrofurantoin for up to 7 days after lab tests show that the infection has cleared. Follow your doctor's instructions.   Use

## 2023-09-08 NOTE — PROGRESS NOTES
If you need to see a   -  just ask the nurse to call us. We look forward to serving your family!!         Jenny Smith

## 2023-09-09 NOTE — CARE COORDINATION
Patient has discharge orders for today. Referral completed for [facility]. Patient has been accepted and approved. Family will transport patient home. Patient has met all treatment goals / milestones. CM will continue to follow and remain available for any needs, concerns or questions that may arise. 09/07/23 1300   Service Assessment   Patient Orientation Alert and Oriented;Person;Place;Situation;Self   Cognition Alert   History Provided By Medical Record; Patient   Primary Caregiver Self   Support Systems Family Members   Patient's Healthcare Decision Maker is: Named in [address]    PCP Verified by CM Yes   Last Visit to PCP Within last 3 months   Prior Functional Level Independent in ADLs/IADLs   Current Functional Level Assistance with the following:;Mobility; Bathing;Dressing; Toileting   Can patient return to prior living arrangement Yes   Ability to make needs known: Good   Family able to assist with home care needs: Yes   Would you like for me to discuss the discharge plan with any other family members/significant others, and if so, who? No   Financial Resources Medicare   Community Resources None   Social/Functional History   Lives With Alone   Type of [address] [place] [address] One level   Home Access Level entry   [address] [facility] unit   [address] [facility] None   Receives Help From Family   ADL Assistance Needs assistance   Toileting Needs assistance   [facility] Needs assistance   Homemaking Responsibilities Yes   Ambulation Assistance Needs assistance   Transfer Assistance Needs assistance   Active  Yes   Mode of Transportation Car   Occupation Retired   Discharge Planning   Type of [address] Prior To Admission None   Potential Assistance Needed N/A   DME Ordered?  No   Potential Assistance Purchasing Medications No   Type of Home Care Services None Patient expects to be discharged to: House   One/Two Story Residence One story   Lift Chair Available No   History of falls? 0   Services At/After Discharge   Transition of Care Consult (CM Consult) Discharge [facility] Discharge Outpatient;PT;OT   [facility] Information Provided? No   Mode of Transport at Discharge Other (see comment)   Confirm Follow Up Transport Family   Condition of Participation: Discharge Planning   The Plan for Transition of Care is related to the following treatment goals: The patient is discharging to acute inpatient rehab.

## 2023-09-10 ENCOUNTER — HOME CARE VISIT (OUTPATIENT)
Dept: HOME HEALTH SERVICES | Facility: HOME HEALTH | Age: 78
End: 2023-09-10

## 2023-09-11 ENCOUNTER — NURSE ONLY (OUTPATIENT)
Dept: UROLOGY | Age: 78
End: 2023-09-11
Payer: MEDICARE

## 2023-09-11 ENCOUNTER — TELEPHONE (OUTPATIENT)
Dept: UROLOGY | Age: 78
End: 2023-09-11

## 2023-09-11 ENCOUNTER — CARE COORDINATION (OUTPATIENT)
Dept: CARE COORDINATION | Facility: CLINIC | Age: 78
End: 2023-09-11

## 2023-09-11 DIAGNOSIS — Z46.6 URINARY CATHETER CHANGE REQUIRED: ICD-10-CM

## 2023-09-11 DIAGNOSIS — R33.9 URINARY RETENTION: Primary | ICD-10-CM

## 2023-09-11 PROCEDURE — 51702 INSERT TEMP BLADDER CATH: CPT | Performed by: UROLOGY

## 2023-09-11 NOTE — PROGRESS NOTES
Pt came in for catheter change. 01H silicone carr catheter changed without incident. Patient tolerated procedure well.

## 2023-09-11 NOTE — TELEPHONE ENCOUNTER
Pt wants to reschedule cath change, I am still not able to schedule cath changes, would you be able to reschedule him please.

## 2023-09-11 NOTE — CARE COORDINATION
Care Transitions Outreach Attempt    Call within 2 business days of discharge: Yes   Attempted to reach patient for transitions of care follow up. Unable to reach patient. Patient: Janet Nolasco Patient : 1945 MRN: [id number]    Last Discharge [address]       Date Complaint Diagnosis Description Type Department Provider    23  Acute UTI (urinary tract infection) Admission (Discharged) [id number] Eron Reed MD              Was this an external facility discharge?  No Discharge Facility: [facility]->IP rehab    Noted following upcoming appointments from discharge chart review:   [facility] follow up appointment(s):   Future Appointments   Date Time Provider [address]   2023  9:15 AM DO ROCHELLE Jimenez GVL AMB   10/9/2023  9:00 AM [id number] INJECTION/CATH [id number] GVL AMB   2023  9:45 AM Tiffany Ramirez MD [id number] GVL AMB     Non-[facility] follow up appointment(s): na

## 2023-09-12 ENCOUNTER — CARE COORDINATION (OUTPATIENT)
Dept: CARE COORDINATION | Facility: CLINIC | Age: 78
End: 2023-09-12

## 2023-09-12 ENCOUNTER — NURSE ONLY (OUTPATIENT)
Dept: UROLOGY | Age: 78
End: 2023-09-12

## 2023-09-12 DIAGNOSIS — R33.9 URINARY RETENTION: Primary | ICD-10-CM

## 2023-09-12 NOTE — PROGRESS NOTES
Patient came to office with complaints of his catheter not draining properly and urine leaking around the catheter. Upon examination, there was a bend in the catheter preventing drainage. He uses a spare catheter strap as well as a stat lock to keep the catheter secure, however the stat lock had shifted and the catheter had become bent, not allowing the urine to pass. I helped him straighten out the catheter, and it immediately began flowing freely. Patient will call if any further issues, otherwise will be seen in 1 month for catheter change.

## 2023-09-12 NOTE — CARE COORDINATION
Care Transitions Outreach Attempt    Call within 2 business days of discharge: Yes   Attempted to reach patient for transitions of care follow up. Unable to reach patient. Patient: John Nolasco Patient : 1945 MRN: [id number]    Last Discharge [address] [facility]       Date Complaint Diagnosis Description Type Department Provider    23  Acute UTI (urinary tract infection) Admission (Discharged) [id number] Nataliia Dorantes MD              Was this an external facility discharge?  No Discharge Facility: [facility]    Noted following upcoming appointments from discharge chart review:   [facility] follow up appointment(s):   Future Appointments   Date Time Provider [address]   2023  9:15 AM DO ROCHELLE Ramires [facility]VL AMB   10/9/2023  9:00 AM [id number] INJECTION/CATH [id number] GVL AMB   2023  9:45 AM Dano Finn MD [id number] GVL AMB     Non-[facility] follow up appointment(s): na

## 2023-09-13 ENCOUNTER — OFFICE VISIT (OUTPATIENT)
Dept: INTERNAL MEDICINE CLINIC | Facility: CLINIC | Age: 78
End: 2023-09-13

## 2023-09-13 ENCOUNTER — CARE COORDINATION (OUTPATIENT)
Dept: CARE COORDINATION | Facility: CLINIC | Age: 78
End: 2023-09-13

## 2023-09-13 VITALS
BODY MASS INDEX: 16.52 KG/M2 | HEIGHT: 68 IN | HEART RATE: 84 BPM | SYSTOLIC BLOOD PRESSURE: 110 MMHG | TEMPERATURE: 98.1 F | WEIGHT: 109 LBS | DIASTOLIC BLOOD PRESSURE: 70 MMHG | OXYGEN SATURATION: 98 %

## 2023-09-13 DIAGNOSIS — Z91.81 AT HIGH RISK FOR FALLS: ICD-10-CM

## 2023-09-13 DIAGNOSIS — D64.9 ANEMIA, UNSPECIFIED TYPE: ICD-10-CM

## 2023-09-13 DIAGNOSIS — R26.89 OTHER ABNORMALITIES OF GAIT AND MOBILITY: ICD-10-CM

## 2023-09-13 DIAGNOSIS — Z09 HOSPITAL DISCHARGE FOLLOW-UP: Primary | ICD-10-CM

## 2023-09-13 RX ORDER — CARVEDILOL 6.25 MG/1
6.25 TABLET ORAL 2 TIMES DAILY WITH MEALS
Qty: 180 TABLET | Refills: 3 | Status: SHIPPED | OUTPATIENT
Start: 2023-09-13

## 2023-09-13 RX ORDER — OLANZAPINE 2.5 MG/1
TABLET ORAL
COMMUNITY
Start: 2023-08-29

## 2023-09-13 RX ORDER — HYDROXYZINE 50 MG/1
50 TABLET, FILM COATED ORAL 2 TIMES DAILY
COMMUNITY
Start: 2023-08-29

## 2023-09-13 NOTE — CARE COORDINATION
Care Transitions Outreach Attempt    Call within 2 business days of discharge: Yes   Attempted to reach patient for transitions of care follow up. Unable to reach patient. Patient: Mitchell Nolasco Patient : 1945 MRN: [id number]    Last Discharge [address]       Date Complaint Diagnosis Description Type Department Provider    23  Acute UTI (urinary tract infection) Admission (Discharged) [id number] Betty Lucas MD              Was this an external facility discharge?  No Discharge Facility: [facility]    Noted following upcoming appointments from discharge chart review:   [facility] follow up appointment(s):   Future Appointments   Date Time Provider [address]   10/9/2023  9:00 AM [id number] INJECTION/CATH [id number] GVL AMB   2023  9:45 AM Fritz Portillo MD [id number] GVL AMB   3/18/2024  9:45 AM TRIM LAB RESOURCE TRIM GVL AMB   3/21/2024  9:30 AM Sarbjit Henderson DO TRIM GVL AMB     Non-[facility] follow up appointment(s): na

## 2023-09-13 NOTE — PROGRESS NOTES
Post-Discharge Transitional Care  Follow Up      [address]   YOB: 1945    Date of Office Visit:  9/13/2023  Date of Hospital Admission: 9/16/23  Date of Hospital Discharge: 9/16/23  Risk of hospital readmission (high >=14%. Medium >=10%) :Readmission Risk Score: 25.8      Care management risk score Rising risk (score 2-5) and Complex Care (Scores >=6): No Risk Score On File     Non face to face  following discharge, date last encounter closed (first attempt may have been earlier): 09/13/2023    Call initiated 2 business days of discharge: Yes    ASSESSMENT/PLAN:   Hospital discharge follow-up  -     MO DISCHARGE MEDS RECONCILED W/ CURRENT OUTPATIENT MED LIST  At high risk for falls  -     External Referral to Physical Therapy  Other abnormalities of gait and mobility  -     External Referral to Physical Therapy  Anemia, unspecified type    Medical Decision Making: moderate complexity  Return in 6 months (on 3/13/2024), or if symptoms worsen or fail to improve, for mwe, labs at  visit. Subjective:   HPI:  Follow up of Hospital problems/diagnosis(es): debility, uti  Principal Problem:    Physical debility  Active Problems:    Leg weakness, bilateral    Anemia    Decreased independence with activities of daily living    Hypothyroidism    Severe protein-calorie malnutrition (HCC)    Chronic indwelling John catheter    Primary hypertension    Pseudomonas urinary tract infection    Encounter for rehabilitation    Impaired functional mobility, balance, gait, and endurance  Resolved Problems:    Sepsis [facility])  Inpatient course: Discharge summary reviewed- see chart.     Interval history/Current status: back baseline almost x leg weakness    Patient Active Problem List   Diagnosis    Fecal incontinence    Hypoxemia    Weight loss    Rectal cancer ([address])    Bladder injury with open wound into cavity    Hypercalcemia of malignancy    Attention to ileostomy (HCC)    Acute blood loss anemia

## 2023-09-15 ENCOUNTER — NURSE ONLY (OUTPATIENT)
Dept: UROLOGY | Age: 78
End: 2023-09-15

## 2023-09-15 DIAGNOSIS — R33.9 URINARY RETENTION: Primary | ICD-10-CM

## 2023-09-15 DIAGNOSIS — N32.89 BLADDER SPASMS: ICD-10-CM

## 2023-09-15 DIAGNOSIS — N32.89 BLADDER SPASMS: Primary | ICD-10-CM

## 2023-09-15 RX ORDER — OXYBUTYNIN CHLORIDE 10 MG/1
10 TABLET, EXTENDED RELEASE ORAL DAILY
Qty: 30 TABLET | Refills: 3 | Status: SHIPPED | OUTPATIENT
Start: 2023-09-15

## 2023-09-15 NOTE — PROGRESS NOTES
Patient came in with complaints of intense bladder spasms but only at night, bad enough that they soak his bedsheets. Per advice and orders of , Rx placed for ditropan 10mg to be taken in addition to the levsin he is already taken for spasms. Catheter was examined, and flowing freely.

## 2023-09-16 ENCOUNTER — HOSPITAL ENCOUNTER (EMERGENCY)
Age: 78
Discharge: HOME | End: 2023-09-16
Attending: EMERGENCY MEDICINE
Payer: MEDICARE

## 2023-09-16 VITALS
HEIGHT: 68 IN | TEMPERATURE: 97.5 F | RESPIRATION RATE: 15 BRPM | OXYGEN SATURATION: 98 % | DIASTOLIC BLOOD PRESSURE: 90 MMHG | HEART RATE: 79 BPM | BODY MASS INDEX: 16.52 KG/M2 | WEIGHT: 109 LBS | SYSTOLIC BLOOD PRESSURE: 149 MMHG

## 2023-09-16 DIAGNOSIS — N39.0 URINARY TRACT INFECTION ASSOCIATED WITH INDWELLING URETHRAL CATHETER, INITIAL ENCOUNTER (HCC): ICD-10-CM

## 2023-09-16 DIAGNOSIS — T83.9XXA FOLEY CATHETER PROBLEM, INITIAL ENCOUNTER (HCC): Primary | ICD-10-CM

## 2023-09-16 DIAGNOSIS — T83.511A URINARY TRACT INFECTION ASSOCIATED WITH INDWELLING URETHRAL CATHETER, INITIAL ENCOUNTER (HCC): ICD-10-CM

## 2023-09-16 LAB
APPEARANCE UR: ABNORMAL
BACTERIA URNS QL MICRO: ABNORMAL /HPF
BILIRUB UR QL: NEGATIVE
CASTS URNS QL MICRO: 0 /LPF
COLOR UR: ABNORMAL
CRYSTALS URNS QL MICRO: 0 /LPF
EPI CELLS #/AREA URNS HPF: 0 /HPF
GLUCOSE UR STRIP.AUTO-MCNC: NEGATIVE MG/DL
HGB UR QL STRIP: ABNORMAL
KETONES UR QL STRIP.AUTO: NEGATIVE MG/DL
LEUKOCYTE ESTERASE UR QL STRIP.AUTO: ABNORMAL
MUCOUS THREADS URNS QL MICRO: 0 /LPF
NITRITE UR QL STRIP.AUTO: POSITIVE
PH UR STRIP: 6 (ref 5–9)
PROT UR STRIP-MCNC: 300 MG/DL
RBC #/AREA URNS HPF: >100 /HPF
SP GR UR REFRACTOMETRY: 1.01 (ref 1–1.02)
URINE CULTURE IF INDICATED: ABNORMAL
UROBILINOGEN UR QL STRIP.AUTO: 0.2 EU/DL (ref 0.2–1)
WBC URNS QL MICRO: >100 /HPF

## 2023-09-16 PROCEDURE — 81001 URINALYSIS AUTO W/SCOPE: CPT

## 2023-09-16 PROCEDURE — 87186 SC STD MICRODIL/AGAR DIL: CPT

## 2023-09-16 PROCEDURE — 87086 URINE CULTURE/COLONY COUNT: CPT

## 2023-09-16 PROCEDURE — 51702 INSERT TEMP BLADDER CATH: CPT

## 2023-09-16 PROCEDURE — 99283 EMERGENCY DEPT VISIT LOW MDM: CPT

## 2023-09-16 PROCEDURE — 87088 URINE BACTERIA CULTURE: CPT

## 2023-09-16 RX ORDER — CEFUROXIME AXETIL 500 MG/1
500 TABLET ORAL 2 TIMES DAILY
Qty: 14 TABLET | Refills: 0 | Status: ON HOLD | OUTPATIENT
Start: 2023-09-16 | End: 2023-09-22 | Stop reason: HOSPADM

## 2023-09-16 ASSESSMENT — ENCOUNTER SYMPTOMS
SHORTNESS OF BREATH: 0
CONSTIPATION: 0
BACK PAIN: 0
VOMITING: 0
ABDOMINAL PAIN: 0
COUGH: 0
DIARRHEA: 0
COLOR CHANGE: 0
NAUSEA: 0

## 2023-09-16 NOTE — ED PROVIDER NOTES
Emergency Department Provider Note       PCP: Sana Ricci DO   Age: 76 y.o. Sex: male     DISPOSITION Decision To Discharge 09/16/2023 11:39:16 AM       ICD-10-CM    1. John catheter problem, initial encounter ([address])  T83. 9XXA       2. Urinary tract infection associated with indwelling urethral catheter, initial encounter ([address])  T83.511A     N39.0           Medical Decision Making     Complexity of Problems Addressed:  1 or more acute illnesses that pose a threat to life or bodily function. Data Reviewed and Analyzed:   I independently ordered and reviewed each unique test.  I reviewed external records: provider visit note from outside specialist.       I independently ordered and interpreted the       Discussion of management or test interpretation. Patient with John catheter leakage. Replaced here and doing well. Will start on antibiotics for UTI. Will refer back to PCP. Risk of Complications and/or Morbidity of Patient Management:  Prescription drug management performed. Patient was discharged risks and benefits of hospitalization were considered. Shared medical decision making was utilized in creating the patients health plan today. Is this patient to be included in the SEP-1 core measure due to severe sepsis or septic shock? No Exclusion criteria - the patient is NOT to be included for SEP-1 Core Measure due to: Infection is not suspected      History      Patient had his John catheter replaced 3 days ago. Since then he he has had leakage of urine around the John catheter through his penis. Still getting some urine in the bag. His bed sheets were soaked last night so came in today for evaluation. The history is provided by the patient. No  was used.    Male  Problem  Presenting symptoms: no dysuria    Context: spontaneously    Relieved by:  Nothing  Worsened by:  Nothing  Ineffective treatments:  None tried  Associated symptoms: urinary retention Currently     Alcohol/week: 11.7 standard drinks of alcohol    Drug use: No     Social Determinants of Health     Financial Resource Strain: Low Risk  (4/26/2023)    Overall Financial Resource Strain (CARDIA)     Difficulty of Paying Living Expenses: Not hard at all   Food Insecurity: No Food Insecurity (4/26/2023)    Hunger Vital Sign     Worried About Running Out of Food in the Last Year: Never true     Ran Out of Food in the Last Year: Never true   Transportation Needs: Unknown (4/26/2023)    PRAPARE - Transportation     Lack of Transportation (Non-Medical): No   Physical Activity: Sufficiently Active (1/25/2023)    Exercise Vital Sign     Days of Exercise per Week: 7 days     Minutes of Exercise per Session: 30 min   Housing Stability: Unknown (4/26/2023)    Housing Stability Vital Sign     Unstable Housing in the Last Year: No        Previous Medications    AMITRIPTYLINE (ELAVIL) 25 MG TABLET    Take 50 mg po nightly for 5 days and then 25 mg po nightly after that    CARVEDILOL (COREG) 6.25 MG TABLET    Take 1 tablet by mouth 2 times daily (with meals)    CLONAZEPAM (KLONOPIN) 2 MG TABLET    2 tablets nightly.     HYDROXYZINE HCL (ATARAX) 50 MG TABLET    Take 1 tablet by mouth 2 times daily    HYOSCYAMINE SULFATE SL (LEVSIN/SL) 0.125 MG SUBL    Place 1 tablet under the tongue every 4 hours as needed (bladder spasms)    LEVOTHYROXINE (SYNTHROID) 125 MCG TABLET    TAKE ONE TABLET BY MOUTH ONE TIME DAILY BEFORE BREAKFAST    OLANZAPINE (ZYPREXA) 2.5 MG TABLET    TAKE ONE TABLET BY MOUTH TWICE A DAY AND TAKE TWO TABLETS BY MOUTH AT BEDTIME    OXYBUTYNIN (DITROPAN XL) 10 MG EXTENDED RELEASE TABLET    Take 1 tablet by mouth daily    QUETIAPINE (SEROQUEL) 25 MG TABLET    Take 1 tablet by mouth 2 times daily as needed    SODIUM FLUORIDE 5000 SENSITIVE 1.1-5 % GEL    BRUSH DAILY        Results for orders placed or performed during the hospital encounter of 09/16/23   Urinalysis with Reflex to Culture    Specimen: Urine

## 2023-09-17 LAB
BACTERIA SPEC CULT: ABNORMAL
SERVICE CMNT-IMP: ABNORMAL

## 2023-09-18 LAB
BACTERIA SPEC CULT: ABNORMAL
SERVICE CMNT-IMP: ABNORMAL

## 2023-09-19 NOTE — PROGRESS NOTES
ED pharmacist reviewed recent results of urine culture. The patient received a prescription for cefuroxime upon discharge. Based on culture results, the patient requires alternative antimicrobial therapy. Discussed case with Dr. Ori Hatfield. A prescription for ciprofloxacin 250mg BID x5d has been called into [facility] on 9/19/23 per Dr. Ori Hatfield. The patient has been advised to follow-up with their primary care provider or return to the ED if symptoms do not resolve or worsen. Allergies as of 09/16/2023    (No Known Allergies)     Kaela Pham, PharmD.   Emergency Medicine Clinical Pharmacist

## 2023-09-20 ENCOUNTER — HOSPITAL ENCOUNTER (INPATIENT)
Age: 78
LOS: 2 days | Discharge: NURSING, REHAB OR LONG-TERM CARE | End: 2023-09-22
Attending: EMERGENCY MEDICINE | Admitting: STUDENT IN AN ORGANIZED HEALTH CARE EDUCATION/TRAINING PROGRAM
Payer: MEDICARE

## 2023-09-20 DIAGNOSIS — N30.00 ACUTE CYSTITIS WITHOUT HEMATURIA: ICD-10-CM

## 2023-09-20 DIAGNOSIS — R53.1 GENERALIZED WEAKNESS: Primary | ICD-10-CM

## 2023-09-20 DIAGNOSIS — E86.0 DEHYDRATION: ICD-10-CM

## 2023-09-20 PROBLEM — T83.511A INFECTION ASSOCIATED WITH INDWELLING URINARY CATHETER, INITIAL ENCOUNTER (HCC): Status: ACTIVE | Noted: 2023-09-20

## 2023-09-20 LAB
ALBUMIN SERPL-MCNC: 3.1 G/DL (ref 3.2–4.6)
ALBUMIN/GLOB SERPL: 0.8 (ref 0.4–1.6)
ALP SERPL-CCNC: 55 U/L (ref 50–136)
ALT SERPL-CCNC: 26 U/L (ref 12–65)
ANION GAP SERPL CALC-SCNC: 4 MMOL/L (ref 2–11)
APPEARANCE UR: ABNORMAL
AST SERPL-CCNC: 23 U/L (ref 15–37)
BACTERIA URNS QL MICRO: ABNORMAL /HPF
BASOPHILS # BLD: 0 K/UL (ref 0–0.2)
BASOPHILS NFR BLD: 1 % (ref 0–2)
BILIRUB SERPL-MCNC: 0.4 MG/DL (ref 0.2–1.1)
BILIRUB UR QL: NEGATIVE
BUN SERPL-MCNC: 22 MG/DL (ref 8–23)
CALCIUM SERPL-MCNC: 8.9 MG/DL (ref 8.3–10.4)
CHLORIDE SERPL-SCNC: 103 MMOL/L (ref 101–110)
CO2 SERPL-SCNC: 27 MMOL/L (ref 21–32)
COLOR UR: ABNORMAL
CREAT SERPL-MCNC: 1.3 MG/DL (ref 0.8–1.5)
DIFFERENTIAL METHOD BLD: ABNORMAL
EKG ATRIAL RATE: 67 BPM
EKG DIAGNOSIS: NORMAL
EKG P AXIS: 53 DEGREES
EKG P-R INTERVAL: 198 MS
EKG Q-T INTERVAL: 437 MS
EKG QRS DURATION: 148 MS
EKG QTC CALCULATION (BAZETT): 462 MS
EKG R AXIS: 161 DEGREES
EKG T AXIS: 62 DEGREES
EKG VENTRICULAR RATE: 67 BPM
EOSINOPHIL # BLD: 0.1 K/UL (ref 0–0.8)
EOSINOPHIL NFR BLD: 1 % (ref 0.5–7.8)
EPI CELLS #/AREA URNS HPF: ABNORMAL /HPF
ERYTHROCYTE [DISTWIDTH] IN BLOOD BY AUTOMATED COUNT: 13.8 % (ref 11.9–14.6)
GLOBULIN SER CALC-MCNC: 3.7 G/DL (ref 2.8–4.5)
GLUCOSE SERPL-MCNC: 91 MG/DL (ref 65–100)
GLUCOSE UR STRIP.AUTO-MCNC: NEGATIVE MG/DL
HCT VFR BLD AUTO: 37.8 % (ref 41.1–50.3)
HGB BLD-MCNC: 12 G/DL (ref 13.6–17.2)
HGB UR QL STRIP: ABNORMAL
IMM GRANULOCYTES # BLD AUTO: 0 K/UL (ref 0–0.5)
IMM GRANULOCYTES NFR BLD AUTO: 0 % (ref 0–5)
KETONES UR QL STRIP.AUTO: NEGATIVE MG/DL
LACTATE SERPL-SCNC: 1 MMOL/L (ref 0.4–2)
LACTATE SERPL-SCNC: 1.8 MMOL/L (ref 0.4–2)
LEUKOCYTE ESTERASE UR QL STRIP.AUTO: ABNORMAL
LYMPHOCYTES # BLD: 0.4 K/UL (ref 0.5–4.6)
LYMPHOCYTES NFR BLD: 7 % (ref 13–44)
MAGNESIUM SERPL-MCNC: 2.2 MG/DL (ref 1.8–2.4)
MCH RBC QN AUTO: 29.6 PG (ref 26.1–32.9)
MCHC RBC AUTO-ENTMCNC: 31.7 G/DL (ref 31.4–35)
MCV RBC AUTO: 93.1 FL (ref 82–102)
MONOCYTES # BLD: 0.4 K/UL (ref 0.1–1.3)
MONOCYTES NFR BLD: 6 % (ref 4–12)
NEUTS SEG # BLD: 5.2 K/UL (ref 1.7–8.2)
NEUTS SEG NFR BLD: 86 % (ref 43–78)
NITRITE UR QL STRIP.AUTO: NEGATIVE
NRBC # BLD: 0 K/UL (ref 0–0.2)
OTHER OBSERVATIONS: ABNORMAL
PH UR STRIP: 6.5 (ref 5–9)
PLATELET # BLD AUTO: 196 K/UL (ref 150–450)
PMV BLD AUTO: 8.8 FL (ref 9.4–12.3)
POTASSIUM SERPL-SCNC: 4.8 MMOL/L (ref 3.5–5.1)
PROCALCITONIN SERPL-MCNC: 0.19 NG/ML (ref 0–0.49)
PROT SERPL-MCNC: 6.8 G/DL (ref 6.3–8.2)
PROT UR STRIP-MCNC: 300 MG/DL
RBC # BLD AUTO: 4.06 M/UL (ref 4.23–5.6)
RBC #/AREA URNS HPF: >100 /HPF
SODIUM SERPL-SCNC: 134 MMOL/L (ref 133–143)
SP GR UR REFRACTOMETRY: 1.02 (ref 1–1.02)
UROBILINOGEN UR QL STRIP.AUTO: 0.2 EU/DL (ref 0.2–1)
WBC # BLD AUTO: 6.1 K/UL (ref 4.3–11.1)
WBC URNS QL MICRO: >100 /HPF

## 2023-09-20 PROCEDURE — 83605 ASSAY OF LACTIC ACID: CPT

## 2023-09-20 PROCEDURE — 87040 BLOOD CULTURE FOR BACTERIA: CPT

## 2023-09-20 PROCEDURE — 6370000000 HC RX 637 (ALT 250 FOR IP): Performed by: STUDENT IN AN ORGANIZED HEALTH CARE EDUCATION/TRAINING PROGRAM

## 2023-09-20 PROCEDURE — 1100000003 HC PRIVATE W/ TELEMETRY

## 2023-09-20 PROCEDURE — 87088 URINE BACTERIA CULTURE: CPT

## 2023-09-20 PROCEDURE — 80053 COMPREHEN METABOLIC PANEL: CPT

## 2023-09-20 PROCEDURE — 81001 URINALYSIS AUTO W/SCOPE: CPT

## 2023-09-20 PROCEDURE — 96360 HYDRATION IV INFUSION INIT: CPT

## 2023-09-20 PROCEDURE — 83735 ASSAY OF MAGNESIUM: CPT

## 2023-09-20 PROCEDURE — 99285 EMERGENCY DEPT VISIT HI MDM: CPT

## 2023-09-20 PROCEDURE — 93005 ELECTROCARDIOGRAM TRACING: CPT | Performed by: EMERGENCY MEDICINE

## 2023-09-20 PROCEDURE — 51702 INSERT TEMP BLADDER CATH: CPT

## 2023-09-20 PROCEDURE — 6360000002 HC RX W HCPCS: Performed by: EMERGENCY MEDICINE

## 2023-09-20 PROCEDURE — 84145 PROCALCITONIN (PCT): CPT

## 2023-09-20 PROCEDURE — 87186 SC STD MICRODIL/AGAR DIL: CPT

## 2023-09-20 PROCEDURE — 6360000002 HC RX W HCPCS: Performed by: STUDENT IN AN ORGANIZED HEALTH CARE EDUCATION/TRAINING PROGRAM

## 2023-09-20 PROCEDURE — 85025 COMPLETE CBC W/AUTO DIFF WBC: CPT

## 2023-09-20 PROCEDURE — 36415 COLL VENOUS BLD VENIPUNCTURE: CPT

## 2023-09-20 PROCEDURE — 2580000003 HC RX 258: Performed by: EMERGENCY MEDICINE

## 2023-09-20 PROCEDURE — 87086 URINE CULTURE/COLONY COUNT: CPT

## 2023-09-20 PROCEDURE — 93010 ELECTROCARDIOGRAM REPORT: CPT | Performed by: INTERNAL MEDICINE

## 2023-09-20 PROCEDURE — 2580000003 HC RX 258: Performed by: STUDENT IN AN ORGANIZED HEALTH CARE EDUCATION/TRAINING PROGRAM

## 2023-09-20 RX ORDER — OLANZAPINE 5 MG/1
5 TABLET, ORALLY DISINTEGRATING ORAL NIGHTLY
Status: DISCONTINUED | OUTPATIENT
Start: 2023-09-20 | End: 2023-09-22 | Stop reason: HOSPADM

## 2023-09-20 RX ORDER — HYDROXYZINE HYDROCHLORIDE 25 MG/1
50 TABLET, FILM COATED ORAL 2 TIMES DAILY
Status: DISCONTINUED | OUTPATIENT
Start: 2023-09-20 | End: 2023-09-22 | Stop reason: HOSPADM

## 2023-09-20 RX ORDER — OLANZAPINE 2.5 MG/1
2.5 TABLET ORAL NIGHTLY
Status: DISCONTINUED | OUTPATIENT
Start: 2023-09-20 | End: 2023-09-20

## 2023-09-20 RX ORDER — CLONAZEPAM 1 MG/1
2 TABLET ORAL NIGHTLY
Status: DISCONTINUED | OUTPATIENT
Start: 2023-09-20 | End: 2023-09-20

## 2023-09-20 RX ORDER — CLONAZEPAM 1 MG/1
4 TABLET ORAL NIGHTLY
Status: DISCONTINUED | OUTPATIENT
Start: 2023-09-21 | End: 2023-09-20

## 2023-09-20 RX ORDER — SODIUM CHLORIDE 0.9 % (FLUSH) 0.9 %
5-40 SYRINGE (ML) INJECTION PRN
Status: DISCONTINUED | OUTPATIENT
Start: 2023-09-20 | End: 2023-09-22 | Stop reason: HOSPADM

## 2023-09-20 RX ORDER — SODIUM CHLORIDE 9 MG/ML
INJECTION, SOLUTION INTRAVENOUS PRN
Status: DISCONTINUED | OUTPATIENT
Start: 2023-09-20 | End: 2023-09-22 | Stop reason: HOSPADM

## 2023-09-20 RX ORDER — ACETAMINOPHEN 650 MG/1
650 SUPPOSITORY RECTAL EVERY 6 HOURS PRN
Status: DISCONTINUED | OUTPATIENT
Start: 2023-09-20 | End: 2023-09-22 | Stop reason: HOSPADM

## 2023-09-20 RX ORDER — SODIUM CHLORIDE 9 MG/ML
INJECTION, SOLUTION INTRAVENOUS CONTINUOUS
Status: DISCONTINUED | OUTPATIENT
Start: 2023-09-20 | End: 2023-09-22 | Stop reason: HOSPADM

## 2023-09-20 RX ORDER — AMITRIPTYLINE HYDROCHLORIDE 50 MG/1
100 TABLET, FILM COATED ORAL NIGHTLY
Status: DISCONTINUED | OUTPATIENT
Start: 2023-09-20 | End: 2023-09-22 | Stop reason: HOSPADM

## 2023-09-20 RX ORDER — QUETIAPINE FUMARATE 25 MG/1
25 TABLET, FILM COATED ORAL 2 TIMES DAILY
Status: DISCONTINUED | OUTPATIENT
Start: 2023-09-20 | End: 2023-09-22 | Stop reason: HOSPADM

## 2023-09-20 RX ORDER — ONDANSETRON 4 MG/1
4 TABLET, ORALLY DISINTEGRATING ORAL EVERY 8 HOURS PRN
Status: DISCONTINUED | OUTPATIENT
Start: 2023-09-20 | End: 2023-09-22 | Stop reason: HOSPADM

## 2023-09-20 RX ORDER — CIPROFLOXACIN 500 MG/1
500 TABLET, FILM COATED ORAL 2 TIMES DAILY
Qty: 14 TABLET | Refills: 0 | Status: SHIPPED | OUTPATIENT
Start: 2023-09-20 | End: 2023-09-22 | Stop reason: HOSPADM

## 2023-09-20 RX ORDER — ACETAMINOPHEN 325 MG/1
650 TABLET ORAL EVERY 6 HOURS PRN
Status: DISCONTINUED | OUTPATIENT
Start: 2023-09-20 | End: 2023-09-22 | Stop reason: HOSPADM

## 2023-09-20 RX ORDER — POLYETHYLENE GLYCOL 3350 17 G/17G
17 POWDER, FOR SOLUTION ORAL DAILY PRN
Status: DISCONTINUED | OUTPATIENT
Start: 2023-09-20 | End: 2023-09-22 | Stop reason: HOSPADM

## 2023-09-20 RX ORDER — SODIUM CHLORIDE 0.9 % (FLUSH) 0.9 %
5-40 SYRINGE (ML) INJECTION EVERY 12 HOURS SCHEDULED
Status: DISCONTINUED | OUTPATIENT
Start: 2023-09-20 | End: 2023-09-22 | Stop reason: HOSPADM

## 2023-09-20 RX ORDER — CIPROFLOXACIN 2 MG/ML
400 INJECTION, SOLUTION INTRAVENOUS ONCE
Status: COMPLETED | OUTPATIENT
Start: 2023-09-20 | End: 2023-09-20

## 2023-09-20 RX ORDER — ENOXAPARIN SODIUM 100 MG/ML
30 INJECTION SUBCUTANEOUS EVERY EVENING
Status: DISCONTINUED | OUTPATIENT
Start: 2023-09-20 | End: 2023-09-22 | Stop reason: HOSPADM

## 2023-09-20 RX ORDER — DIPHENHYDRAMINE HYDROCHLORIDE 50 MG/ML
12.5 INJECTION INTRAMUSCULAR; INTRAVENOUS
Status: COMPLETED | OUTPATIENT
Start: 2023-09-20 | End: 2023-09-20

## 2023-09-20 RX ORDER — ONDANSETRON 2 MG/ML
4 INJECTION INTRAMUSCULAR; INTRAVENOUS EVERY 6 HOURS PRN
Status: DISCONTINUED | OUTPATIENT
Start: 2023-09-20 | End: 2023-09-22 | Stop reason: HOSPADM

## 2023-09-20 RX ORDER — OXYBUTYNIN CHLORIDE 10 MG/1
10 TABLET, EXTENDED RELEASE ORAL DAILY
Status: DISCONTINUED | OUTPATIENT
Start: 2023-09-20 | End: 2023-09-22 | Stop reason: HOSPADM

## 2023-09-20 RX ORDER — 0.9 % SODIUM CHLORIDE 0.9 %
1000 INTRAVENOUS SOLUTION INTRAVENOUS
Status: COMPLETED | OUTPATIENT
Start: 2023-09-20 | End: 2023-09-20

## 2023-09-20 RX ORDER — CLONAZEPAM 1 MG/1
4 TABLET ORAL NIGHTLY
Status: DISCONTINUED | OUTPATIENT
Start: 2023-09-20 | End: 2023-09-22 | Stop reason: HOSPADM

## 2023-09-20 RX ORDER — CIPROFLOXACIN 2 MG/ML
400 INJECTION, SOLUTION INTRAVENOUS EVERY 12 HOURS
Status: DISCONTINUED | OUTPATIENT
Start: 2023-09-21 | End: 2023-09-22 | Stop reason: HOSPADM

## 2023-09-20 RX ADMIN — DIPHENHYDRAMINE HYDROCHLORIDE 12.5 MG: 50 INJECTION INTRAMUSCULAR; INTRAVENOUS at 17:07

## 2023-09-20 RX ADMIN — SODIUM CHLORIDE 1000 ML: 9 INJECTION, SOLUTION INTRAVENOUS at 12:15

## 2023-09-20 RX ADMIN — CIPROFLOXACIN 400 MG: 2 INJECTION, SOLUTION INTRAVENOUS at 15:51

## 2023-09-20 RX ADMIN — AMITRIPTYLINE HYDROCHLORIDE 100 MG: 50 TABLET, FILM COATED ORAL at 21:00

## 2023-09-20 RX ADMIN — HYDROXYZINE HYDROCHLORIDE 50 MG: 25 TABLET, FILM COATED ORAL at 20:59

## 2023-09-20 RX ADMIN — SODIUM CHLORIDE: 9 INJECTION, SOLUTION INTRAVENOUS at 20:59

## 2023-09-20 RX ADMIN — SODIUM CHLORIDE, PRESERVATIVE FREE 10 ML: 5 INJECTION INTRAVENOUS at 21:01

## 2023-09-20 RX ADMIN — OLANZAPINE 5 MG: 5 TABLET, ORALLY DISINTEGRATING ORAL at 20:59

## 2023-09-20 RX ADMIN — OXYBUTYNIN CHLORIDE 10 MG: 10 TABLET, EXTENDED RELEASE ORAL at 21:05

## 2023-09-20 RX ADMIN — CLONAZEPAM 4 MG: 1 TABLET ORAL at 21:48

## 2023-09-20 RX ADMIN — QUETIAPINE FUMARATE 25 MG: 25 TABLET ORAL at 20:59

## 2023-09-20 RX ADMIN — ENOXAPARIN SODIUM 30 MG: 100 INJECTION SUBCUTANEOUS at 21:01

## 2023-09-20 ASSESSMENT — ENCOUNTER SYMPTOMS
VOICE CHANGE: 0
VISUAL CHANGE: 0
DIARRHEA: 0
ANAL BLEEDING: 0
ABDOMINAL PAIN: 0
COUGH: 0
TROUBLE SWALLOWING: 0
VOMITING: 0
EYE PAIN: 0
EYE REDNESS: 0
CHEST TIGHTNESS: 0
COLOR CHANGE: 0

## 2023-09-20 ASSESSMENT — PAIN SCALES - GENERAL
PAINLEVEL_OUTOF10: 0
PAINLEVEL_OUTOF10: 0

## 2023-09-20 ASSESSMENT — PAIN - FUNCTIONAL ASSESSMENT: PAIN_FUNCTIONAL_ASSESSMENT: 0-10

## 2023-09-20 NOTE — ED NOTES
Pt's arm began to appear red and splotchy 10-15 after administration of cipro. Pt denies arm pain/ itchiness or respiratory symptoms. Pt's SpO2 currently 99%. IV site intact, flushed, blood return noted. Cipro paused. Admitting provider notified. Awaiting orders.       Parvin Vazquez RN  09/20/23 3746

## 2023-09-20 NOTE — CARE COORDINATION
Patient returns after recent hospitalization and inpatient rehab stay. He has seen his PCP since discharge who ordered home therapy which patient states starts on Tuesday. Meanwhile patient returns to the ED and states he is not able to care for self at home or walk. He is willing to participate in therapy and is hopeful for inpatient. Medical evaluation in process to determine next steps. 09/20/23 1520   Service Assessment   Patient Orientation Alert and Oriented   Cognition Alert   History Provided By Patient   Primary [address] is: Legal Next of Kin   PCP Verified by CM Yes   Last Visit to PCP Within last 3 months   Prior Functional Level Assistance with the following:;Bathing;Cooking;Housework; Shopping;Mobility   Current Functional Level Assistance with the following:;Bathing;Cooking;Housework; Shopping;Mobility   Can patient return to prior living arrangement Unknown at present   Ability to make needs known: Fair   Family able to assist with home care needs: No   Would you like for me to discuss the discharge plan with any other family members/significant others, and if so, who? No   Financial Resources Medicare   Services At/After Discharge   Transition of Care Consult (CM Consult) Discharge Planning   Condition of Participation: Discharge Planning   The Plan for Transition of Care is related to the following treatment goals: based on clinical course   The Patient and/or Patient Representative was provided with a Choice of Provider? Patient   The Patient and/Or Patient Representative agree with the Discharge Plan? Yes   Freedom of Choice list was provided with basic dialogue that supports the patient's individualized plan of care/goals, treatment preferences, and shares the quality data associated with the providers?   Yes

## 2023-09-20 NOTE — ED NOTES
Unsuccessful attempt x 2 to get IV/labs due to pt's veins blowing.      Sonia Pittman LPN  18/30/71 9532

## 2023-09-20 NOTE — H&P
Regular; Low Fat/Low Chol/High Fiber/LEAH  VTE prophylaxis: Lovenox  Code status: Full Code      Non-peripheral Lines and Tubes (if present):             Hospital Problems:  Principal Problem:    Infection associated with indwelling urinary catheter, initial encounter ([address])  Resolved Problems:    * No resolved hospital problems.  *      Past History:     Past Medical History:   Diagnosis Date    Acute deep vein thrombosis (DVT) of non-extremity vein 5/20/2019    Anemia     Cancer (HCC)     Family history of malignant neoplasm of gastrointestinal tract     mother colon/ovarian cancer 67    Fecal incontinence     LAR syndrome    John catheter in place 2022    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    surgery and radiation    Psychiatric disorder     anxiety    Thyroid disease        Past Surgical History:   Procedure Laterality Date    COLONOSCOPY  last 2/3/15    Roberto--no polyps--3 year recall    COLONOSCOPY N/A 3/5/2021    COLONOSCOPY/BMI 19 performed by Birdia Gowers, MD at [address] [place] [address] N/A 2/12/2018    COLONOSCOPY / BMI=21 performed by Birdia Gowers, MD at [place]  2/12/2018         COLONOSCOPY THRU STOMA,LESN RMVL W/SNARE  3/5/2021         COLOSTOMY  5/11/16    CT RETROPERITONEAL PERC DRAIN  5/13/2021    CT RETROPERITONEAL PERC DRAIN 5/13/2021 [facility] CT SCAN    CT RETROPERITONEAL PERC DRAIN  5/20/2021    CT RETROPERITONEAL [facility] DRAIN 5/20/2021 [facility] Packs/day: 1.00     Years: 10.00     Additional pack years: 0.00     Total pack years: 10.00     Types: Cigarettes     Quit date: 1963     Years since quittin.8     Passive exposure: Current    Smokeless tobacco: Never   Substance Use Topics    Alcohol use: Not Currently     Alcohol/week: 11.7 standard drinks of alcohol      Social History     Substance and Sexual Activity   Drug Use No       Family History   Problem Relation Age of Onset    Cancer Mother         colon and ovarian    Cancer Brother         prostate    Alcohol Abuse Brother     Cancer Maternal Grandmother         bone    Alcohol Abuse Father     Stroke Paternal Grandfather     Alcohol Abuse Sister         Immunization History   Administered Date(s) Administered    COVID-19, PFIZER PURPLE top, DILUTE for use, (age 15 y+), 30mcg/0.3mL 02/15/2021, 2021, 10/25/2021    PPD Test 2014, 2014, 2014, 2015, 2016, 2019, 2019, 2019, 2019, 2021, 2021, 2022, 2022, 10/17/2022, 2022, 2023     No Known Allergies  Prior to Admit Medications:  Current Outpatient Medications   Medication Instructions    amitriptyline (ELAVIL) 25 MG tablet Take 50 mg po nightly for 5 days and then 25 mg po nightly after that    carvedilol (COREG) 6.25 mg, Oral, 2 TIMES DAILY WITH MEALS    cefUROXime (CEFTIN) 500 mg, Oral, 2 TIMES DAILY    ciprofloxacin (CIPRO) 500 mg, Oral, 2 TIMES DAILY    clonazePAM (KLONOPIN) 4 mg, NIGHTLY    hydrOXYzine HCl (ATARAX) 50 mg, Oral, 2 TIMES DAILY    Hyoscyamine Sulfate SL (LEVSIN/SL) 0.125 MG SUBL 1 tablet, SubLINGual, EVERY 4 HOURS PRN    levothyroxine (SYNTHROID) 125 MCG tablet TAKE ONE TABLET BY MOUTH ONE TIME DAILY BEFORE BREAKFAST    OLANZapine (ZYPREXA) 2.5 MG tablet TAKE ONE TABLET BY MOUTH TWICE A DAY AND TAKE TWO TABLETS BY MOUTH AT BEDTIME    oxybutynin (DITROPAN XL) 10 mg, Oral, DAILY    QUEtiapine (SEROQUEL) 25 mg, Oral, 2 TIMES DAILY PRN

## 2023-09-20 NOTE — ED TRIAGE NOTES
Pt brought in by medic 19 from home (address in chart) reporting chronic weakness, unchanged but felt he might fall today. Baseline slurred speech.    (-)fever, cough, dizziness/lightheaded   A&Ox4

## 2023-09-20 NOTE — PROGRESS NOTES
TRANSFER - IN REPORT:    Verbal report received from MIGUEL Perez on [address]  being received from ED for routine progression of patient care      Report consisted of patient's Situation, Background, Assessment and   Recommendations(SBAR). Information from the following report(s) Nurse Handoff Report was reviewed with the receiving nurse. Opportunity for questions and clarification was provided. Assessment completed upon patient's arrival to unit and care assumed.

## 2023-09-20 NOTE — ACP (ADVANCE CARE PLANNING)
Advance Care Planning   Healthcare Decision Maker:    Primary Decision Maker: Argenis Marc Weinstein/Nephew - [phone]    Click here to complete Healthcare Decision Makers including selection of the Healthcare Decision Maker Relationship (ie \"Primary\").

## 2023-09-20 NOTE — ED NOTES
TRANSFER - OUT REPORT:    Verbal report given to MIGUEL BRAND on [address]  being transferred to 6th floor for routine progression of patient care       Report consisted of patient's Situation, Background, Assessment and   Recommendations(SBAR). Information from the following report(s) Nurse Handoff Report was reviewed with the receiving nurse. Gaston Fall Assessment:    Presents to emergency department  because of falls (Syncope, seizure, or loss of consciousness): No  Age > 70: Yes  Altered Mental Status, Intoxication with alcohol or substance confusion (Disorientation, impaired judgment, poor safety awaremess, or inability to follow instructions): No  Impaired Mobility: Ambulates or transfers with assistive devices or assistance; Unable to ambulate or transer.: Yes  Nursing Judgement: Yes          Lines:   Peripheral IV 09/20/23 Left;Proximal;Anterior Forearm (Active)        Opportunity for questions and clarification was provided.       Patient transported with:  Lee Ann Sanchez RN  09/20/23 2834

## 2023-09-20 NOTE — CARE COORDINATION
Patient lives alone and states he has become too weak to take care of himself and returns to the ED today with that and other complaints. He is willing to participate in rehab again. Outpatient PT has not started yet since his most recent discharge. 09/20/23 1524   Readmission Assessment   Number of Days since last admission? 8-30 days   Previous Disposition Home Alone   Who is being Interviewed Patient   What was the patient's/caregiver's perception as to why they think they needed to return back to the hospital? Other (Comment)  (patient states he is weak and has difficulty walking and caring for self)   Did you visit your Primary Care Physician after you left the hospital, before you returned this time? Yes   Did you see a specialist, such as Cardiac, Pulmonary, Orthopedic Physician, etc. after you left the hospital? Other (Comment)  (unknown)   Does the patient report anything that got in the way of taking their medications? No   In our efforts to provide the best possible care to you and others like you, can you think of anything that we could have done to help you after you left the hospital the first time, so that you might not have needed to return so soon?  Other (Comment)

## 2023-09-20 NOTE — ED PROVIDER NOTES
Emergency Department Provider Note       PCP: Genevieve Rubio DO   Age: 76 y.o. Sex: male     DISPOSITION Decision To Discharge 09/20/2023 02:58:18 PM       ICD-10-CM    1. Generalized weakness  R53.1       2. Dehydration  E86.0       3. Acute cystitis without hematuria  N30.00           Medical Decision Making     Complexity of Problems Addressed:  1 or more acute illnesses that pose a threat to life or bodily function. Data Reviewed and Analyzed:  I independently ordered and reviewed each unique test.  I reviewed external records: ED visit note from an outside group. I reviewed external records: provider visit note from PCP. I reviewed external records: provider visit note from outside specialist.  I reviewed external records: previous lab results from outside ED. EKG interpretation: Normal sinus rhythm, rate of 67, normal axis, no blocks, no ST segment elevation or depression noted    Discussion of management or test interpretation. Chronically ill-appearing patient. Blood pressure here noted to be 89/63. Concern is given his recent infection diagnosis that was noted to be resistant he could be developing sepsis. Blood cultures, labs and collateral acid will be obtained. 2:59 PM EDT  Labs show normal white blood cell count. Normal lactic acid and procalcitonin. Urinalysis consistent with infection. Most recent urinalysis did show Pseudomonas. Clinically patient has improved with IV fluids. Blood pressure stable. Able to ambulate but requires significant assistance. Case management made aware of patient. Patient discloses that he has concerns about going home and ability to care for himself. Has home health but does not feel this is sufficient. Will discuss case with hospitalist about admission. Risk of Complications and/or Morbidity of Patient Management:  Prescription drug management performed.   Shared medical decision making was utilized in creating the patients 125 MCG TABLET    TAKE ONE TABLET BY MOUTH ONE TIME DAILY BEFORE BREAKFAST    OLANZAPINE (ZYPREXA) 2.5 MG TABLET    TAKE ONE TABLET BY MOUTH TWICE A DAY AND TAKE TWO TABLETS BY MOUTH AT BEDTIME    OXYBUTYNIN (DITROPAN XL) 10 MG EXTENDED RELEASE TABLET    Take 1 tablet by mouth daily    QUETIAPINE (SEROQUEL) 25 MG TABLET    Take 1 tablet by mouth 2 times daily as needed    SODIUM FLUORIDE 5000 SENSITIVE 1.1-5 % GEL    BRUSH DAILY        Results for orders placed or performed during the hospital encounter of 09/20/23   CBC with Auto Differential   Result Value Ref Range    WBC 6.1 4.3 - 11.1 K/uL    RBC 4.06 (L) 4.23 - 5.6 M/uL    Hemoglobin 12.0 (L) 13.6 - 17.2 g/dL    Hematocrit 37.8 (L) 41.1 - 50.3 %    MCV 93.1 82 - 102 FL    MCH 29.6 26.1 - 32.9 PG    MCHC 31.7 31.4 - 35.0 g/dL    RDW 13.8 11.9 - 14.6 %    Platelets 718 300 - 577 K/uL    MPV 8.8 (L) 9.4 - 12.3 FL    nRBC 0.00 0.0 - 0.2 K/uL    Differential Type AUTOMATED      Neutrophils % 86 (H) 43 - 78 %    Lymphocytes % 7 (L) 13 - 44 %    Monocytes % 6 4.0 - 12.0 %    Eosinophils % 1 0.5 - 7.8 %    Basophils % 1 0.0 - 2.0 %    Immature Granulocytes 0 0.0 - 5.0 %    Neutrophils Absolute 5.2 1.7 - 8.2 K/UL    Lymphocytes Absolute 0.4 (L) 0.5 - 4.6 K/UL    Monocytes Absolute 0.4 0.1 - 1.3 K/UL    Eosinophils Absolute 0.1 0.0 - 0.8 K/UL    Basophils Absolute 0.0 0.0 - 0.2 K/UL    Absolute Immature Granulocyte 0.0 0.0 - 0.5 K/UL   Lactate, Sepsis   Result Value Ref Range    Lactic Acid, Sepsis 1.0 0.4 - 2.0 MMOL/L   Magnesium   Result Value Ref Range    Magnesium 2.2 1.8 - 2.4 mg/dL   CMP   Result Value Ref Range    Sodium 134 133 - 143 mmol/L    Potassium 4.8 3.5 - 5.1 mmol/L    Chloride 103 101 - 110 mmol/L    CO2 27 21 - 32 mmol/L    Anion Gap 4 2 - 11 mmol/L    Glucose 91 65 - 100 mg/dL    BUN 22 8 - 23 MG/DL    Creatinine 1.30 0.8 - 1.5 MG/DL    Est, Glom Filt Rate 56 (L) >60 ml/min/1.73m2    Calcium 8.9 8.3 - 10.4 MG/DL    Total Bilirubin 0.4 0.2 - 1.1 MG/DL

## 2023-09-21 LAB
ANION GAP SERPL CALC-SCNC: 4 MMOL/L (ref 2–11)
BASOPHILS # BLD: 0 K/UL (ref 0–0.2)
BASOPHILS NFR BLD: 1 % (ref 0–2)
BUN SERPL-MCNC: 19 MG/DL (ref 8–23)
CALCIUM SERPL-MCNC: 8.2 MG/DL (ref 8.3–10.4)
CHLORIDE SERPL-SCNC: 106 MMOL/L (ref 101–110)
CO2 SERPL-SCNC: 29 MMOL/L (ref 21–32)
CREAT SERPL-MCNC: 1 MG/DL (ref 0.8–1.5)
DIFFERENTIAL METHOD BLD: ABNORMAL
EOSINOPHIL # BLD: 0.1 K/UL (ref 0–0.8)
EOSINOPHIL NFR BLD: 2 % (ref 0.5–7.8)
ERYTHROCYTE [DISTWIDTH] IN BLOOD BY AUTOMATED COUNT: 13.6 % (ref 11.9–14.6)
GLUCOSE SERPL-MCNC: 93 MG/DL (ref 65–100)
HCT VFR BLD AUTO: 33.2 % (ref 41.1–50.3)
HGB BLD-MCNC: 10.5 G/DL (ref 13.6–17.2)
IMM GRANULOCYTES # BLD AUTO: 0 K/UL (ref 0–0.5)
IMM GRANULOCYTES NFR BLD AUTO: 0 % (ref 0–5)
LYMPHOCYTES # BLD: 0.4 K/UL (ref 0.5–4.6)
LYMPHOCYTES NFR BLD: 11 % (ref 13–44)
MCH RBC QN AUTO: 29.5 PG (ref 26.1–32.9)
MCHC RBC AUTO-ENTMCNC: 31.6 G/DL (ref 31.4–35)
MCV RBC AUTO: 93.3 FL (ref 82–102)
MONOCYTES # BLD: 0.4 K/UL (ref 0.1–1.3)
MONOCYTES NFR BLD: 9 % (ref 4–12)
NEUTS SEG # BLD: 3 K/UL (ref 1.7–8.2)
NEUTS SEG NFR BLD: 77 % (ref 43–78)
NRBC # BLD: 0 K/UL (ref 0–0.2)
PLATELET # BLD AUTO: 162 K/UL (ref 150–450)
PMV BLD AUTO: 9.2 FL (ref 9.4–12.3)
POTASSIUM SERPL-SCNC: 3.8 MMOL/L (ref 3.5–5.1)
RBC # BLD AUTO: 3.56 M/UL (ref 4.23–5.6)
SODIUM SERPL-SCNC: 139 MMOL/L (ref 133–143)
WBC # BLD AUTO: 3.9 K/UL (ref 4.3–11.1)

## 2023-09-21 PROCEDURE — 1100000003 HC PRIVATE W/ TELEMETRY

## 2023-09-21 PROCEDURE — 36415 COLL VENOUS BLD VENIPUNCTURE: CPT

## 2023-09-21 PROCEDURE — 6360000002 HC RX W HCPCS: Performed by: STUDENT IN AN ORGANIZED HEALTH CARE EDUCATION/TRAINING PROGRAM

## 2023-09-21 PROCEDURE — 97161 PT EVAL LOW COMPLEX 20 MIN: CPT

## 2023-09-21 PROCEDURE — 2580000003 HC RX 258: Performed by: STUDENT IN AN ORGANIZED HEALTH CARE EDUCATION/TRAINING PROGRAM

## 2023-09-21 PROCEDURE — 97116 GAIT TRAINING THERAPY: CPT

## 2023-09-21 PROCEDURE — 6370000000 HC RX 637 (ALT 250 FOR IP): Performed by: STUDENT IN AN ORGANIZED HEALTH CARE EDUCATION/TRAINING PROGRAM

## 2023-09-21 PROCEDURE — 97530 THERAPEUTIC ACTIVITIES: CPT

## 2023-09-21 PROCEDURE — 85025 COMPLETE CBC W/AUTO DIFF WBC: CPT

## 2023-09-21 PROCEDURE — 97165 OT EVAL LOW COMPLEX 30 MIN: CPT

## 2023-09-21 PROCEDURE — 51702 INSERT TEMP BLADDER CATH: CPT

## 2023-09-21 PROCEDURE — 80048 BASIC METABOLIC PNL TOTAL CA: CPT

## 2023-09-21 RX ADMIN — QUETIAPINE FUMARATE 25 MG: 25 TABLET ORAL at 09:44

## 2023-09-21 RX ADMIN — LEVOTHYROXINE SODIUM 125 MCG: 0.07 TABLET ORAL at 05:33

## 2023-09-21 RX ADMIN — SODIUM CHLORIDE, PRESERVATIVE FREE 10 ML: 5 INJECTION INTRAVENOUS at 22:18

## 2023-09-21 RX ADMIN — CIPROFLOXACIN 400 MG: 400 INJECTION, SOLUTION INTRAVENOUS at 03:45

## 2023-09-21 RX ADMIN — OXYBUTYNIN CHLORIDE 10 MG: 10 TABLET, EXTENDED RELEASE ORAL at 09:44

## 2023-09-21 RX ADMIN — AMITRIPTYLINE HYDROCHLORIDE 100 MG: 50 TABLET, FILM COATED ORAL at 22:13

## 2023-09-21 RX ADMIN — HYDROXYZINE HYDROCHLORIDE 50 MG: 25 TABLET, FILM COATED ORAL at 09:44

## 2023-09-21 RX ADMIN — QUETIAPINE FUMARATE 25 MG: 25 TABLET ORAL at 22:13

## 2023-09-21 RX ADMIN — OLANZAPINE 5 MG: 5 TABLET, ORALLY DISINTEGRATING ORAL at 22:14

## 2023-09-21 RX ADMIN — ENOXAPARIN SODIUM 30 MG: 100 INJECTION SUBCUTANEOUS at 17:59

## 2023-09-21 RX ADMIN — HYDROXYZINE HYDROCHLORIDE 50 MG: 25 TABLET, FILM COATED ORAL at 22:13

## 2023-09-21 RX ADMIN — CIPROFLOXACIN 400 MG: 400 INJECTION, SOLUTION INTRAVENOUS at 15:24

## 2023-09-21 RX ADMIN — CLONAZEPAM 4 MG: 1 TABLET ORAL at 22:13

## 2023-09-21 RX ADMIN — SODIUM CHLORIDE, PRESERVATIVE FREE 10 ML: 5 INJECTION INTRAVENOUS at 09:44

## 2023-09-21 NOTE — CARE COORDINATION
1524 Update:  Patient has been accepted by [facility]. He can d/c to [facility] when medically stable.     --------------------    Per PT/OT - patient will need rehab at d/c. Referral to [facility] per recommendation and patient's choice. CM following for determination.

## 2023-09-21 NOTE — PROGRESS NOTES
ACUTE PHYSICAL THERAPY GOALS:   (Developed with and agreed upon by patient and/or caregiver.)    (1.) Elva Loja  will move from supine to sit and sit to supine , scoot up and down, and roll side to side with INDEPENDENT within 7 treatment day(s). (2.) Elva Loja will transfer from bed to chair and chair to bed with INDEPENDENT using the least restrictive device within 7 treatment day(s). (3.) Elva Loja will ambulate with INDEPENDENT for 500 feet with the least restrictive device within 7 treatment day(s). (4.) Elva Loja will perform standing static and dynamic balance activities x 20 minutes with INDEPENDENT to improve safety within 7 treatment day(s). (5.) Elva Loja will ascend and descend 12 stairs using 1 hand rail(s) with INDEPENDENT to improve functional mobility and safety within 7 treatment day(s). (6.) Elva Loja will perform therapeutic exercises x 20 min for HEP with INDEPENDENT to improve strength, endurance, and functional mobility within 7 treatment day(s).       PHYSICAL THERAPY Initial Assessment, Daily Note, and AM  (Link to Caseload Tracking: PT Visit Days : 1  Acknowledge Orders  Time In/Out  PT Charge Capture  Rehab Caseload Tracker    Elva Loja is a 76 y.o. male   PRIMARY DIAGNOSIS: Infection associated with indwelling urinary catheter, initial encounter ([address])  Dehydration [E86.0]  Generalized weakness [R53.1]  Acute cystitis without hematuria [N30.00]  Infection associated with indwelling urinary catheter, initial encounter ([address]) Kimberlee Schulte       Reason for Referral: Generalized Muscle Weakness (M62.81)  Difficulty in walking, Not elsewhere classified (R26.2)  Inpatient: Payor: MEDICARE / Plan: MEDICARE PART A AND B / Product Type: *No Product type* /     ASSESSMENT:     REHAB RECOMMENDATIONS:   Recommendation to date pending progress:  Setting:  Inpatient Rehab Facility    Equipment:    To Be Determined Given To: Patient  Education Provided: Role of Therapy;Plan of Care;Precautions;Transfer Training;Equipment; Fall Prevention Strategies  Education Method: Verbal  Barriers to Learning: None  Education Outcome: Continued education needed    TIME IN/OUT:  Time In: 0911  Time Out: Edwin  Minutes: BOB Young

## 2023-09-21 NOTE — PROGRESS NOTES
Risk    PAIN: VITALS / O2:   Pre Treatment: 0/10         Post Treatment: same       Vitals          Oxygen            GROSS EVALUATION: INTACT IMPAIRED   (See Comments)   UE AROM [x] []WFL but generally decreased    UE PROM [x] []   Strength []    Generalized weakness; 4 to 4+/5 in  BUE   Posture / Balance []  Good sitting; fair to fair- standing    Sensation [x]     Coordination []  Impaired      Tone []       Edema [x]    Activity Tolerance []  Fair      Hand Dominance R [] L []      COGNITION/  PERCEPTION: INTACT IMPAIRED   (See Comments)   Orientation [x]     Vision [x]     Hearing [x]     Cognition  []  Cues for safety    Perception [x]       MOBILITY: I Mod I S SBA CGA Min Mod Max Total  NT x2 Comments:   Bed Mobility    Rolling [] [] [] [] [] [x] [] [] [] [] []    Supine to Sit [] [] [] [] [] [x] [] [] [] [] []    Scooting [] [] [] [] [] [x] [] [] [] [] []    Sit to Supine [] [] [] [] [] [] [] [] [] [x] []    Transfers    Sit to Stand [] [] [] [] [x] [] [] [] [] [] []    Bed to Chair [] [] [] [] [] [x] [] [] [] [] []    Stand to Sit [] [] [] [] [x] [] [] [] [] [] []    Tub/Shower [] [] [] [] [] [] [] [] [] [x] []     Toilet [] [] [] [] [] [] [] [] [] [x] []      [] [] [] [] [] [] [] [] [] [] []    I=Independent, Mod I=Modified Independent, S=Supervision/Setup, SBA=Standby Assistance, CGA=Contact Guard Assistance, Min=Minimal Assistance, Mod=Moderate Assistance, Max=Maximal Assistance, Total=Total Assistance, NT=Not Tested    ACTIVITIES OF DAILY LIVING: I Mod I S SBA CGA Min Mod Max Total NT Comments   BASIC ADLs:              Upper Body Bathing  [] [] [] [] [] [] [] [] [] [x]     Lower Body Bathing [] [] [] [] [] [] [] [] [] [x]     Toileting [] [] [] [] [] [] [] [] [] [x]    Upper Body Dressing [] [] [] [] [] [] [] [] [] [x]    Lower Body Dressing [] [] [] [] [] [] [] [] [] [x]    Feeding [] [] [] [] [] [] [] [] [] [x]    Grooming [] [] [] [] [x] [] [] [] [] [] Standing at the sink    Personal Device Care [] within reach, Chair, and RN notified    INTERDISCIPLINARY COLLABORATION:  RN/ PCT, PT/ PTA, and OT/ [place]    EDUCATION:  Education Given To: Patient  Education Provided: Role of Therapy;Plan of Care  Education Method: Verbal  Education Outcome: Verbalized understanding    TOTAL TREATMENT DURATION AND TIME:  Time In: 0911  Time Out: 5756  Minutes: 555 Renny Manjarrez, OT

## 2023-09-21 NOTE — PROGRESS NOTES
Pt emptied colostomy bag earlier in the shift. Large BM noted. Pt is very unsteady on feet. Hourly rounding completed and all needs met. Bed is low, call light is in reach, and pt is encouraged to ask for assistance. Pt is resting in bed with no complaints. Will give report to oncoming shift.

## 2023-09-21 NOTE — PLAN OF CARE
Problem: Discharge Planning  Goal: Discharge to home or other facility with appropriate resources  Outcome: Progressing  Flowsheets (Taken 9/20/2023 2113)  Discharge to home or other facility with appropriate resources:   Identify barriers to discharge with patient and caregiver   Arrange for needed discharge resources and transportation as appropriate     Problem: Pain  Goal: Verbalizes/displays adequate comfort level or baseline comfort level  Outcome: Progressing     Problem: Safety - Adult  Goal: Free from fall injury  Outcome: Progressing     Problem: Skin/Tissue Integrity  Goal: Absence of new skin breakdown  Description: 1. Monitor for areas of redness and/or skin breakdown  2. Assess vascular access sites hourly  3. Every 4-6 hours minimum:  Change oxygen saturation probe site  4. Every 4-6 hours:  If on nasal continuous positive airway pressure, respiratory therapy assess nares and determine need for appliance change or resting period.   Outcome: Progressing     Problem: ABCDS Injury Assessment  Goal: Absence of physical injury  Outcome: Progressing

## 2023-09-21 NOTE — CONSULTS
Infectious Disease Consult      Today's Date: 9/21/2023   Admit Date: 9/20/2023    Impression:   Pyuria with recent hx of recurrent Pseudomonas/Enterococcus UTI: UCX positive on 9/16/23 with PsA; repeat UCX 9/20 with GNRs in urine. Pt completed Cipro for 7 days course and nitrofurantoin 50mg x7 days. Chronic Carr catheter with recurrent UTIs: (hx of E coli, PSA, Enteroccocus faecalis) changed monthly, last changed on 9/16 in the ER due to leakage of Carr. S/p Right ureteral stent placement due to bladder injury, stent change every 6 months and last was on 6/20/23. Hx of Polymicrobial Bacteremia in (6/2022) with Klebsiella, E coli and Enterococcus faecalis. Rectal cancer s/p colostomy  Severe protein-calorie malnutrition    Plan: This is most likely colonization and not a true infection. Would not treat with antibiotics. In the future, would recommend to remove carr and obtain sterile urine sample if he comes in with clinical symptoms such as fevers with positive blood cultures. Otherwise, UA should not be checked as he's colonized. Thank you for this consult. ID will sign off at this time. Please reach out with any questions/concerns. Anti-infectives:   Ciprofloxacin 9/20-  Ceftin 9/16 x7 days     Subjective:   Date of Consultation:  September 21, 2023  Date of Admission: 9/20/2023   Referring Provider: Quiana Cardozo NP  Reason for Consult: \"urinary tract colonization vs true bacteria/has had adequate treatment but recurrent cultures/abx regimens/chronic indwelling cath\"    Patient is a 76 y.o. male with PMH of rectal cancer s/p colostomy, chronic Carr catheter with recurrent UTIs (also s/p right ureteral stent placement and follows with Dr. Aysha Grant), severe protein, calorie malnutrition who was admitted on 9/20/23 for generalized weakness and hypotension. Pt is well known to ID service. Pt was recently hospitalized for sepsis due to acute pyelonephritis from 8/29/23-9/1/23.  During that cigarette smoker     History of rectal cancer     Hypothyroid     stable w/med    Infection and inflammatory reaction due to other internal prosthetic devices, implants and grafts, subsequent encounter     abd wound/mesh colostomy    Insomnia     takes meds    Major depression     Osteoarthritis, hand     Personal history of colonic polyps 9/2013    x 1    Personal history of malignant neoplasm of rectum, rectosigmoid junction, and anus 2013    surgery and radiation    Psychiatric disorder     anxiety    Thyroid disease       Family History   Problem Relation Age of Onset    Cancer Mother         colon and ovarian    Cancer Brother         prostate    Alcohol Abuse Brother     Cancer Maternal Grandmother         bone    Alcohol Abuse Father     Stroke Paternal Grandfather     Alcohol Abuse Sister       Social History     Tobacco Use    Smoking status: Former     Packs/day: 1.00     Years: 10.00     Additional pack years: 0.00     Total pack years: 10.00     Types: Cigarettes     Quit date: 1963     Years since quittin.8     Passive exposure: Current    Smokeless tobacco: Never   Substance Use Topics    Alcohol use: Not Currently     Alcohol/week: 11.7 standard drinks of alcohol     Past Surgical History:   Procedure Laterality Date    COLONOSCOPY  last 2/3/15    Roberto--no polyps--3 year recall    COLONOSCOPY N/A 3/5/2021    COLONOSCOPY/BMI 19 performed by Darius Ray MD at [address] N/A 2018    COLONOSCOPY / BMI=21 performed by Darius Ray MD at [place]  2018         COLONOSCOPY THRU STOMA,LESN RMVL W/SNARE  3/5/2021         COLOSTOMY  16    CT RETROPERITONEAL PERC DRAIN  2021    CT RETROPERITONEAL PERC DRAIN 2021 [facility] CT SCAN    CT RETROPERITONEAL PERC DRAIN  2021    CT RETROPERITONEAL PERC DRAIN 2021 [facility] CT SCAN    CYSTOSCOPY Right 2022    CYSTOSCOPY, RIGHT RETROGRADE

## 2023-09-21 NOTE — PROGRESS NOTES
Hospitalist Progress Note   Admit Date:  2023 11:02 AM   Name:  Yarelis Nolasco   Age:  76 y.o. Sex:  male  :  1945   MRN:  [id number]   [facility]    Presenting/Chief Complaint: Fatigue     Reason(s) for Admission: Dehydration [E86.0]  Generalized weakness [R53.1]  Acute cystitis without hematuria [N30.00]  Infection associated with indwelling urinary catheter, initial encounter [address] Course:   Patient is a 75 y/o male with medical history of rectal cancer s/p colostomy, chronic John catheter with recurrent UTI (s/p right ureteral stent placement due to bladder injury), MDD/Bipolar disorder, severe protein-calorie malnutrition, chronic BLE weakness who presented to ED with cc generalized weakness. Recent admission - for sepsis from Pseudomonas and Enterococcus Faecalis UTI and progressive generalized weakness, CT urogram noted right ureteral stent in place, no hydronephrosis, discharged to [facility]  then home . Patient was evaluated in ED  for leaking urinary catheter, John was replaced in ED  and was initiated on Cefuroxime for UTI. Urine culture finalized with Pseudomonas. ED workup: afebrile HR 70 BP 89/63 98% on RA  Labs notable for albumin 3.1 Hgb 12.0 neutrophilia (86%)  UA > 100 WBC 4+ bacteria, large LE  Urine and blood cultures obtained. Does not meet SIRS/sepsis criteria on admission. Received Ciprofloxacin 400 mg IV, 1000 cc NS bolus in ED. Blood pressure improved with IVF resuscitation. Hospitalist admission requested. Noted patient feels he is unable to go home and care for himself and that [facility] has not been sufficient to assist. PT/OT with rehab recommendations. [facility] evaluating for admission. Subjective & 24hr Events:   Patient is alert and oriented. He denies chest pain, shortness of breath, fever, urinary complaints. Denies fever prior to presentation. Overall he just feels weak.  Anticipate weakness is

## 2023-09-21 NOTE — H&P
Comprehensive Nutrition Assessment    Type and Reason for Visit: Initial, Positive Nutrition Screen  Best Practice Alert for BMI <18.5    Nutrition Recommendations/Plan:   Meals and Snacks:  Diet: Continue current order  Extra portions when pt requests. Nutrition Supplement Therapy:  Medical food supplement therapy:  Initiate Ensure High Protein three times per day (this provides 160 kcal and 16 grams protein per bottle)      Malnutrition Assessment:  Malnutrition Status: Severe malnutrition  Context: Chronic Illness  Findings of clinical characteristics of malnutrition:   Energy Intake:   (hx waxing and waning intake)  Weight Loss:  Mild weight loss (specify amount and time period) (11% loss since Nov 2022 based on stated wt)     Body Fat Loss:  Severe body fat loss Orbital, Triceps   Muscle Mass Loss:  Severe muscle mass loss Temples (temporalis), Clavicles (pectoralis & deltoids), Hand (interosseous)  Fluid Accumulation:  No significant fluid accumulation     Strength:  Not Performed     Nutrition Assessment:  Nutrition History: Pt with hx of TPN 2019 following bowel surgeries. Nutrition hx per RD note 11/2022: \"Pt reports variable intake/appetite PTA- typically consumes 2 meals/day + snacks. Pt reports lowest weight of ~115lb prior to recent STR admission however states he gained 8-10lb during STR and now weighs ~123-125lb. Pt states prior to rectal cancer dx and colostomy in 2013 he typically weighed 155lb, reports prior active lifestyle. Pt reports prior intake of ONS in STR but none at home. \"    8/30 encounter: At encounter today pt relates he continues with 2 meals per day am meal generally 2 packets of grits w 2 eggs and ground turkey, eats out one meal day last example 6 wings and 5-6 shrimps. In addition to this he consumes one serving of 25 gram whey protein mixed in water.   He elects not to eat after 3pm secondary to concerns of volume of colostomy output and not wanting to make a mess while

## 2023-09-21 NOTE — PROGRESS NOTES
Pt  bed in lowest position, wheels locked, and call light within reach. Hourly rounds completed. VSS. IV is patent and dressing is clean, dry, and intact. Colostomy bag changed, John patent and draining.

## 2023-09-21 NOTE — PROGRESS NOTES
John bag exchanged from leg bag to standard, Pt's home meds updated and ordered. Detail Level: Detailed

## 2023-09-22 VITALS
HEART RATE: 89 BPM | SYSTOLIC BLOOD PRESSURE: 139 MMHG | HEIGHT: 68 IN | DIASTOLIC BLOOD PRESSURE: 94 MMHG | BODY MASS INDEX: 16.52 KG/M2 | TEMPERATURE: 98.4 F | RESPIRATION RATE: 18 BRPM | OXYGEN SATURATION: 97 % | WEIGHT: 109 LBS

## 2023-09-22 PROBLEM — T83.511A: Status: RESOLVED | Noted: 2023-09-20 | Resolved: 2023-09-22

## 2023-09-22 LAB
ALBUMIN SERPL-MCNC: 2.7 G/DL (ref 3.2–4.6)
ALBUMIN/GLOB SERPL: 0.9 (ref 0.4–1.6)
ALP SERPL-CCNC: 51 U/L (ref 50–136)
ALT SERPL-CCNC: 22 U/L (ref 12–65)
ANION GAP SERPL CALC-SCNC: 2 MMOL/L (ref 2–11)
AST SERPL-CCNC: 16 U/L (ref 15–37)
BACTERIA SPEC CULT: ABNORMAL
BASOPHILS # BLD: 0 K/UL (ref 0–0.2)
BASOPHILS NFR BLD: 1 % (ref 0–2)
BILIRUB SERPL-MCNC: 0.2 MG/DL (ref 0.2–1.1)
BUN SERPL-MCNC: 21 MG/DL (ref 8–23)
CALCIUM SERPL-MCNC: 8.3 MG/DL (ref 8.3–10.4)
CHLORIDE SERPL-SCNC: 106 MMOL/L (ref 101–110)
CO2 SERPL-SCNC: 32 MMOL/L (ref 21–32)
CREAT SERPL-MCNC: 1.1 MG/DL (ref 0.8–1.5)
DIFFERENTIAL METHOD BLD: ABNORMAL
EOSINOPHIL # BLD: 0.2 K/UL (ref 0–0.8)
EOSINOPHIL NFR BLD: 4 % (ref 0.5–7.8)
ERYTHROCYTE [DISTWIDTH] IN BLOOD BY AUTOMATED COUNT: 13.6 % (ref 11.9–14.6)
GLOBULIN SER CALC-MCNC: 2.9 G/DL (ref 2.8–4.5)
GLUCOSE SERPL-MCNC: 89 MG/DL (ref 65–100)
HCT VFR BLD AUTO: 33.9 % (ref 41.1–50.3)
HGB BLD-MCNC: 10.8 G/DL (ref 13.6–17.2)
IMM GRANULOCYTES # BLD AUTO: 0 K/UL (ref 0–0.5)
IMM GRANULOCYTES NFR BLD AUTO: 0 % (ref 0–5)
LYMPHOCYTES # BLD: 0.5 K/UL (ref 0.5–4.6)
LYMPHOCYTES NFR BLD: 12 % (ref 13–44)
MCH RBC QN AUTO: 29.8 PG (ref 26.1–32.9)
MCHC RBC AUTO-ENTMCNC: 31.9 G/DL (ref 31.4–35)
MCV RBC AUTO: 93.6 FL (ref 82–102)
MONOCYTES # BLD: 0.4 K/UL (ref 0.1–1.3)
MONOCYTES NFR BLD: 11 % (ref 4–12)
NEUTS SEG # BLD: 2.8 K/UL (ref 1.7–8.2)
NEUTS SEG NFR BLD: 72 % (ref 43–78)
NRBC # BLD: 0 K/UL (ref 0–0.2)
PLATELET # BLD AUTO: 171 K/UL (ref 150–450)
PMV BLD AUTO: 8.8 FL (ref 9.4–12.3)
POTASSIUM SERPL-SCNC: 3.9 MMOL/L (ref 3.5–5.1)
PROT SERPL-MCNC: 5.6 G/DL (ref 6.3–8.2)
RBC # BLD AUTO: 3.62 M/UL (ref 4.23–5.6)
SERVICE CMNT-IMP: ABNORMAL
SODIUM SERPL-SCNC: 140 MMOL/L (ref 133–143)
WBC # BLD AUTO: 3.9 K/UL (ref 4.3–11.1)

## 2023-09-22 PROCEDURE — 6370000000 HC RX 637 (ALT 250 FOR IP): Performed by: STUDENT IN AN ORGANIZED HEALTH CARE EDUCATION/TRAINING PROGRAM

## 2023-09-22 PROCEDURE — 85025 COMPLETE CBC W/AUTO DIFF WBC: CPT

## 2023-09-22 PROCEDURE — 36415 COLL VENOUS BLD VENIPUNCTURE: CPT

## 2023-09-22 PROCEDURE — 2580000003 HC RX 258: Performed by: STUDENT IN AN ORGANIZED HEALTH CARE EDUCATION/TRAINING PROGRAM

## 2023-09-22 PROCEDURE — 6360000002 HC RX W HCPCS: Performed by: STUDENT IN AN ORGANIZED HEALTH CARE EDUCATION/TRAINING PROGRAM

## 2023-09-22 PROCEDURE — 80053 COMPREHEN METABOLIC PANEL: CPT

## 2023-09-22 RX ORDER — AMITRIPTYLINE HYDROCHLORIDE 100 MG/1
100 TABLET ORAL NIGHTLY
Qty: 30 TABLET | Refills: 0
Start: 2023-09-22 | End: 2023-10-22

## 2023-09-22 RX ADMIN — SODIUM CHLORIDE, PRESERVATIVE FREE 10 ML: 5 INJECTION INTRAVENOUS at 09:12

## 2023-09-22 RX ADMIN — LEVOTHYROXINE SODIUM 125 MCG: 0.07 TABLET ORAL at 06:18

## 2023-09-22 RX ADMIN — HYDROXYZINE HYDROCHLORIDE 50 MG: 25 TABLET, FILM COATED ORAL at 09:12

## 2023-09-22 RX ADMIN — CIPROFLOXACIN 400 MG: 400 INJECTION, SOLUTION INTRAVENOUS at 03:57

## 2023-09-22 RX ADMIN — OXYBUTYNIN CHLORIDE 10 MG: 10 TABLET, EXTENDED RELEASE ORAL at 09:12

## 2023-09-22 RX ADMIN — QUETIAPINE FUMARATE 25 MG: 25 TABLET ORAL at 09:12

## 2023-09-22 NOTE — PROGRESS NOTES
If you need to see a   -  just ask the nurse to call us. We look forward to serving your family!!         Darvin Coronel

## 2023-09-22 NOTE — PROGRESS NOTES
Physician Progress Note      PATIENT:               Renan Murphy  CSN #:                  [id number]  :                       1945  ADMIT DATE:       2023 11:02 AM  [address] DATE:  RESPONDING  PROVIDER #:        Juan Sweeney NP          QUERY TEXT:    Patient admitted with infection associated with urinary catheter. If possible,   please document in the progress notes and discharge summary if infection was: The medical record reflects the following:  Risk Factors: Ureteral stent, chronic urethral catheter, recent sepsis, hx of   UTI's  Clinical Indicators: Discharge summary notes, \"Infectious disease   consultation: most likely colonization and not true infection, recommend to   stop antibiotics. \"  Treatment: Cipro, IVF bolus, ID consult  Options provided:  -- Infection associated with indwelling urinary catheter ruled out after study  -- Other - I will add my own diagnosis  -- Disagree - Not applicable / Not valid  -- Disagree - Clinically unable to determine / Unknown  -- Refer to Clinical Documentation Reviewer    PROVIDER RESPONSE TEXT:    Infection associated with indwelling urinary catheter ruled out after study. Query created by: Mercedez Azevedo on 2023 2:28 PM      QUERY TEXT:    Pt admitted with infection associated with indwelling urinary catheter. Noted   documentation of severe malnutrition in Pmx. If possible, please document in   clarify current status of severe malnutrition. The medical record reflects the following:  Risk Factors: Age, cancer, hx of severe malnutrition  Clinical Indicators: Patient noted to have severe malnutrition, admitted with   BMI of 16. Dietician consult notes severe malnutrition, Severe body fat loss,   Severe muscle mass loss, and on oral supplements.   Treatment: Dietician consult, oral supplements  Options provided:  -- Severe malnutrition currently being treated/evaluated  -- Other - I will add my own diagnosis  -- Disagree - Not applicable /

## 2023-09-22 NOTE — PROGRESS NOTES
TRANSFER - OUT REPORT:    Verbal report given to [facility] on Elinda Meals  being transferred to [facility] for routine progression of patient care       Report consisted of patient's Situation, Background, Assessment and   Recommendations(SBAR). Information from the following report(s) Nurse Handoff Report was reviewed with the receiving nurse. Opportunity for questions and clarification was provided.       Patient transported with: Ashanti Barger

## 2023-09-22 NOTE — PROGRESS NOTES
Pt emptied his colostomy bag one time tonight. Medicated per MAR. Hourly rounds completed during this shift. Pt resting with eyes closed, respirations noted. Bed lowered and locked with call light within reach.

## 2023-09-22 NOTE — CARE COORDINATION
Patient is scheduled for d/c today 9.22.2023. Patient will go to [facility] rehab. Patient is agreeable to d/c plan. Medical milestones met. Anmol Infante will transport patient at 1500. CM will remain available until d/c is complete.        09/22/23 1404   Service Assessment   Patient Orientation Alert and Oriented   Cognition Alert   History Provided By Patient   Primary [address] Madie Beyer Family Members   Patient's Healthcare Decision Maker is: Named in [address]    PCP Verified by CM Yes   Last Visit to PCP Within last 6 months   Prior Functional Level Assistance with the following:;Mobility   Current Functional Level Assistance with the following:;Mobility   Can patient return to prior living arrangement Unknown at present   Ability to make needs known: Good   Family able to assist with home care needs: Yes   Would you like for me to discuss the discharge plan with any other family members/significant others, and if so, who? No   Discharge Planning   Type of Residence Acute Rehab   Patient expects to be discharged to: Acute rehab   Services At/After Discharge   Mode of Transport at Discharge [address] Time of Discharge 1500   Confirm Follow Up Transport Family   Condition of Participation: Discharge Planning   The Patient and/or Patient Representative was provided with a Choice of Provider? Patient   The Patient and/Or Patient Representative agree with the Discharge Plan? Yes   Freedom of Choice list was provided with basic dialogue that supports the patient's individualized plan of care/goals, treatment preferences, and shares the quality data associated with the providers?   Yes

## 2023-09-25 ENCOUNTER — CARE COORDINATION (OUTPATIENT)
Dept: CARE COORDINATION | Facility: CLINIC | Age: 78
End: 2023-09-25

## 2023-09-25 ENCOUNTER — ENROLLMENT (OUTPATIENT)
Dept: CARE COORDINATION | Facility: CLINIC | Age: 78
End: 2023-09-25

## 2023-09-25 NOTE — CARE COORDINATION
Transition of care outreach postponed for 14 days due to patient's discharge to [facility] for short term rehab.

## 2023-10-05 ENCOUNTER — CARE COORDINATION (OUTPATIENT)
Dept: CARE COORDINATION | Facility: CLINIC | Age: 78
End: 2023-10-05

## 2023-10-05 NOTE — CARE COORDINATION
Care Transitions Post-Acute Facility Discharge Call    10/5/2023    Patient: Jessy Nolasco Patient : 1945   MRN: [id number]  Reason for Admission: indwelling catheter infection  Discharge Date: 23 RARS: Readmission Risk Score: 35.8    Patient d/c from STR 10/4, CTN with unsuccessful attempt at [facility] call. CTN to attempt again at a later time.       Discharge Facility:    Care Transitions Post Acute Facility Transition    Post Acute Facility: [facility]  Post Acute Discharge Date: 10/4/23  Do you have all of your prescriptions and are they filled?: Yes         Do you have support at home?: Alone      Care Transitions Interventions         Future Appointments   Date Time Provider [address]   10/9/2023  9:00 AM [id number] INJECTION/CATH [id number] [facility] AMB   10/9/2023 10:15 AM Beata STANFORD DO TRIM [facility] AMB   2023  9:45 AM Pablo Moreno MD [id number] [facility] AMB   3/18/2024  9:45 AM TRIM LAB RESOURCE TRIM [facility] AMB   3/21/2024  9:30 AM Enu Govea DO TRIM [facility] AMB       Job Jose RN

## 2023-10-06 ENCOUNTER — TELEPHONE (OUTPATIENT)
Dept: INTERNAL MEDICINE CLINIC | Facility: CLINIC | Age: 78
End: 2023-10-06

## 2023-10-06 ENCOUNTER — CARE COORDINATION (OUTPATIENT)
Dept: CARE COORDINATION | Facility: CLINIC | Age: 78
End: 2023-10-06

## 2023-10-06 NOTE — TELEPHONE ENCOUNTER
The patient was D/C'ed from rehab on home health. They were calling the see if Dr Kehinde Cordoba would sign the plan of care and they would fax over the orders.  Yes to plan of care

## 2023-10-06 NOTE — TELEPHONE ENCOUNTER
Soheila Renee with [facility] called and would like to know if you would give her a call back in regards to some orders for the patient. Please Advise.

## 2023-10-09 ENCOUNTER — TELEPHONE (OUTPATIENT)
Dept: UROLOGY | Age: 78
End: 2023-10-09

## 2023-10-09 NOTE — TELEPHONE ENCOUNTER
Patient called stating that he wants an order put in so that his Home Nurse can remove his cath. He would like a call back concerning this.

## 2023-10-10 NOTE — TELEPHONE ENCOUNTER
Pt called m stating he had home health coming out to his house and wanted an order sent over for nursing to change catheter out because he is not able to drive. I called pt back [facility] for pt to call back and let me know which home health care was coming out.  [facility] home health, [facility], etc.

## 2023-10-11 ENCOUNTER — CARE COORDINATION (OUTPATIENT)
Dept: CARE COORDINATION | Facility: CLINIC | Age: 78
End: 2023-10-11

## 2023-10-11 NOTE — CARE COORDINATION
CTN unable to reach for follow up after recent ABDIEL call after SNF d/c. Patient as been reaching out to providers as needed. CTN to attempt again at a later time.

## 2023-11-30 ENCOUNTER — APPOINTMENT (OUTPATIENT)
Dept: GENERAL RADIOLOGY | Age: 78
DRG: 564 | End: 2023-11-30
Payer: MEDICARE

## 2023-11-30 ENCOUNTER — APPOINTMENT (OUTPATIENT)
Dept: CT IMAGING | Age: 78
DRG: 564 | End: 2023-11-30
Payer: MEDICARE

## 2023-11-30 ENCOUNTER — HOSPITAL ENCOUNTER (INPATIENT)
Age: 78
LOS: 4 days | Discharge: NURSING, REHAB OR LONG-TERM CARE | DRG: 564 | End: 2023-12-04
Attending: EMERGENCY MEDICINE | Admitting: INTERNAL MEDICINE
Payer: MEDICARE

## 2023-11-30 DIAGNOSIS — T79.6XXA TRAUMATIC RHABDOMYOLYSIS, INITIAL ENCOUNTER (HCC): ICD-10-CM

## 2023-11-30 DIAGNOSIS — F32.A DEPRESSION, UNSPECIFIED DEPRESSION TYPE: ICD-10-CM

## 2023-11-30 DIAGNOSIS — M79.89 SWELLING OF RIGHT UPPER EXTREMITY: ICD-10-CM

## 2023-11-30 DIAGNOSIS — F41.9 ANXIETY: ICD-10-CM

## 2023-11-30 DIAGNOSIS — G93.40 ACUTE ENCEPHALOPATHY: ICD-10-CM

## 2023-11-30 DIAGNOSIS — T68.XXXA HYPOTHERMIA, INITIAL ENCOUNTER: ICD-10-CM
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
## 2023-11-30 DIAGNOSIS — R53.1 GENERALIZED WEAKNESS: ICD-10-CM

## 2023-11-30 PROBLEM — M62.82 RHABDOMYOLYSIS: Status: ACTIVE | Noted: 2023-11-30

## 2023-11-30 LAB
ALBUMIN SERPL-MCNC: 3.1 G/DL (ref 3.2–4.6)
ALBUMIN/GLOB SERPL: 0.7 (ref 0.4–1.6)
ALP SERPL-CCNC: 79 U/L (ref 50–136)
ALT SERPL-CCNC: 43 U/L (ref 12–65)
ANION GAP SERPL CALC-SCNC: 3 MMOL/L (ref 2–11)
AST SERPL-CCNC: 80 U/L (ref 15–37)
BASOPHILS # BLD: 0 K/UL (ref 0–0.2)
BASOPHILS NFR BLD: 0 % (ref 0–2)
BILIRUB SERPL-MCNC: 0.5 MG/DL (ref 0.2–1.1)
BUN SERPL-MCNC: 24 MG/DL (ref 8–23)
CALCIUM SERPL-MCNC: 9 MG/DL (ref 8.3–10.4)
CHLORIDE SERPL-SCNC: 100 MMOL/L (ref 101–110)
CK SERPL-CCNC: 1867 U/L (ref 21–215)
CO2 SERPL-SCNC: 28 MMOL/L (ref 21–32)
CREAT SERPL-MCNC: 1.2 MG/DL (ref 0.8–1.5)
DIFFERENTIAL METHOD BLD: ABNORMAL
EOSINOPHIL # BLD: 0 K/UL (ref 0–0.8)
EOSINOPHIL NFR BLD: 0 % (ref 0.5–7.8)
ERYTHROCYTE [DISTWIDTH] IN BLOOD BY AUTOMATED COUNT: 12.6 % (ref 11.9–14.6)
GLOBULIN SER CALC-MCNC: 4.3 G/DL (ref 2.8–4.5)
GLUCOSE BLD STRIP.AUTO-MCNC: 113 MG/DL (ref 65–100)
GLUCOSE SERPL-MCNC: 126 MG/DL (ref 65–100)
HCT VFR BLD AUTO: 43.9 % (ref 41.1–50.3)
HGB BLD-MCNC: 14.2 G/DL (ref 13.6–17.2)
IMM GRANULOCYTES # BLD AUTO: 0 K/UL (ref 0–0.5)
IMM GRANULOCYTES NFR BLD AUTO: 0 % (ref 0–5)
LACTATE SERPL-SCNC: 2.1 MMOL/L (ref 0.4–2)
LYMPHOCYTES # BLD: 0.2 K/UL (ref 0.5–4.6)
LYMPHOCYTES NFR BLD: 3 % (ref 13–44)
MAGNESIUM SERPL-MCNC: 2 MG/DL (ref 1.8–2.4)
MCH RBC QN AUTO: 29.1 PG (ref 26.1–32.9)
MCHC RBC AUTO-ENTMCNC: 32.3 G/DL (ref 31.4–35)
MCV RBC AUTO: 90 FL (ref 82–102)
MONOCYTES # BLD: 0.5 K/UL (ref 0.1–1.3)
MONOCYTES NFR BLD: 6 % (ref 4–12)
NEUTS SEG # BLD: 8.4 K/UL (ref 1.7–8.2)
NEUTS SEG NFR BLD: 91 % (ref 43–78)
NRBC # BLD: 0 K/UL (ref 0–0.2)
PLATELET # BLD AUTO: 219 K/UL (ref 150–450)
PMV BLD AUTO: 8.8 FL (ref 9.4–12.3)
POTASSIUM SERPL-SCNC: 3.5 MMOL/L (ref 3.5–5.1)
PROCALCITONIN SERPL-MCNC: 0.49 NG/ML (ref 0–0.49)
PROT SERPL-MCNC: 7.4 G/DL (ref 6.3–8.2)
RBC # BLD AUTO: 4.88 M/UL (ref 4.23–5.6)
SERVICE CMNT-IMP: ABNORMAL
SODIUM SERPL-SCNC: 131 MMOL/L (ref 133–143)
TSH W FREE THYROID IF ABNORMAL: 1.67 UIU/ML (ref 0.36–3.74)
WBC # BLD AUTO: 9.2 K/UL (ref 4.3–11.1)

## 2023-11-30 PROCEDURE — 80053 COMPREHEN METABOLIC PANEL: CPT

## 2023-11-30 PROCEDURE — 83605 ASSAY OF LACTIC ACID: CPT

## 2023-11-30 PROCEDURE — 70450 CT HEAD/BRAIN W/O DYE: CPT

## 2023-11-30 PROCEDURE — 72170 X-RAY EXAM OF PELVIS: CPT

## 2023-11-30 PROCEDURE — 87040 BLOOD CULTURE FOR BACTERIA: CPT

## 2023-11-30 PROCEDURE — 6360000002 HC RX W HCPCS: Performed by: INTERNAL MEDICINE

## 2023-11-30 PROCEDURE — 99285 EMERGENCY DEPT VISIT HI MDM: CPT

## 2023-11-30 PROCEDURE — 1100000000 HC RM PRIVATE

## 2023-11-30 PROCEDURE — 94760 N-INVAS EAR/PLS OXIMETRY 1: CPT

## 2023-11-30 PROCEDURE — 96360 HYDRATION IV INFUSION INIT: CPT

## 2023-11-30 PROCEDURE — 82550 ASSAY OF CK (CPK): CPT

## 2023-11-30 PROCEDURE — 6370000000 HC RX 637 (ALT 250 FOR IP): Performed by: INTERNAL MEDICINE

## 2023-11-30 PROCEDURE — 84443 ASSAY THYROID STIM HORMONE: CPT

## 2023-11-30 PROCEDURE — 36415 COLL VENOUS BLD VENIPUNCTURE: CPT

## 2023-11-30 PROCEDURE — 82962 GLUCOSE BLOOD TEST: CPT

## 2023-11-30 PROCEDURE — 2500000003 HC RX 250 WO HCPCS: Performed by: INTERNAL MEDICINE

## 2023-11-30 PROCEDURE — 6360000002 HC RX W HCPCS: Performed by: EMERGENCY MEDICINE

## 2023-11-30 PROCEDURE — 85025 COMPLETE CBC W/AUTO DIFF WBC: CPT

## 2023-11-30 PROCEDURE — 83735 ASSAY OF MAGNESIUM: CPT

## 2023-11-30 PROCEDURE — 2580000003 HC RX 258: Performed by: EMERGENCY MEDICINE

## 2023-11-30 PROCEDURE — 71045 X-RAY EXAM CHEST 1 VIEW: CPT

## 2023-11-30 PROCEDURE — 2580000003 HC RX 258: Performed by: INTERNAL MEDICINE

## 2023-11-30 PROCEDURE — 84145 PROCALCITONIN (PCT): CPT

## 2023-11-30 RX ORDER — OLANZAPINE 5 MG/1
5 TABLET ORAL NIGHTLY
Status: DISCONTINUED | OUTPATIENT
Start: 2023-11-30 | End: 2023-12-04 | Stop reason: HOSPADM

## 2023-11-30 RX ORDER — SODIUM CHLORIDE 9 MG/ML
INJECTION, SOLUTION INTRAVENOUS CONTINUOUS
Status: DISCONTINUED | OUTPATIENT
Start: 2023-11-30 | End: 2023-12-04 | Stop reason: HOSPADM

## 2023-11-30 RX ORDER — SODIUM CHLORIDE 0.9 % (FLUSH) 0.9 %
5-40 SYRINGE (ML) INJECTION EVERY 12 HOURS SCHEDULED
Status: DISCONTINUED | OUTPATIENT
Start: 2023-11-30 | End: 2023-12-04 | Stop reason: HOSPADM

## 2023-11-30 RX ORDER — 0.9 % SODIUM CHLORIDE 0.9 %
1000 INTRAVENOUS SOLUTION INTRAVENOUS ONCE
Status: COMPLETED | OUTPATIENT
Start: 2023-11-30 | End: 2023-11-30

## 2023-11-30 RX ORDER — CLONAZEPAM 1 MG/1
4 TABLET ORAL NIGHTLY PRN
Status: DISCONTINUED | OUTPATIENT
Start: 2023-11-30 | End: 2023-12-03

## 2023-11-30 RX ORDER — LEVOTHYROXINE SODIUM 0.12 MG/1
125 TABLET ORAL DAILY
Status: DISCONTINUED | OUTPATIENT
Start: 2023-12-01 | End: 2023-12-04 | Stop reason: HOSPADM

## 2023-11-30 RX ORDER — ONDANSETRON 4 MG/1
4 TABLET, ORALLY DISINTEGRATING ORAL EVERY 8 HOURS PRN
Status: DISCONTINUED | OUTPATIENT
Start: 2023-11-30 | End: 2023-12-04 | Stop reason: HOSPADM

## 2023-11-30 RX ORDER — ONDANSETRON 2 MG/ML
4 INJECTION INTRAMUSCULAR; INTRAVENOUS EVERY 6 HOURS PRN
Status: DISCONTINUED | OUTPATIENT
Start: 2023-11-30 | End: 2023-12-04 | Stop reason: HOSPADM

## 2023-11-30 RX ORDER — POLYETHYLENE GLYCOL 3350 17 G/17G
17 POWDER, FOR SOLUTION ORAL DAILY PRN
Status: DISCONTINUED | OUTPATIENT
Start: 2023-11-30 | End: 2023-12-04 | Stop reason: HOSPADM

## 2023-11-30 RX ORDER — ACETAMINOPHEN 650 MG/1
650 SUPPOSITORY RECTAL EVERY 6 HOURS PRN
Status: DISCONTINUED | OUTPATIENT
Start: 2023-11-30 | End: 2023-12-04 | Stop reason: HOSPADM

## 2023-11-30 RX ORDER — BISACODYL 10 MG
10 SUPPOSITORY, RECTAL RECTAL DAILY PRN
Status: DISCONTINUED | OUTPATIENT
Start: 2023-11-30 | End: 2023-12-04 | Stop reason: HOSPADM

## 2023-11-30 RX ORDER — ACETAMINOPHEN 325 MG/1
650 TABLET ORAL EVERY 6 HOURS PRN
Status: DISCONTINUED | OUTPATIENT
Start: 2023-11-30 | End: 2023-12-04 | Stop reason: HOSPADM

## 2023-11-30 RX ORDER — SODIUM CHLORIDE 0.9 % (FLUSH) 0.9 %
5-40 SYRINGE (ML) INJECTION PRN
Status: DISCONTINUED | OUTPATIENT
Start: 2023-11-30 | End: 2023-12-04 | Stop reason: HOSPADM

## 2023-11-30 RX ORDER — ENOXAPARIN SODIUM 100 MG/ML
30 INJECTION SUBCUTANEOUS DAILY
Status: DISCONTINUED | OUTPATIENT
Start: 2023-11-30 | End: 2023-11-30 | Stop reason: SDUPTHER

## 2023-11-30 RX ORDER — QUETIAPINE FUMARATE 25 MG/1
25 TABLET, FILM COATED ORAL 2 TIMES DAILY PRN
Status: DISCONTINUED | OUTPATIENT
Start: 2023-11-30 | End: 2023-12-04 | Stop reason: HOSPADM

## 2023-11-30 RX ORDER — FAMOTIDINE 20 MG/1
10 TABLET, FILM COATED ORAL DAILY PRN
Status: DISCONTINUED | OUTPATIENT
Start: 2023-11-30 | End: 2023-12-04 | Stop reason: HOSPADM

## 2023-11-30 RX ORDER — AMITRIPTYLINE HYDROCHLORIDE 25 MG/1
100 TABLET, FILM COATED ORAL NIGHTLY
Status: DISCONTINUED | OUTPATIENT
Start: 2023-11-30 | End: 2023-12-04 | Stop reason: HOSPADM

## 2023-11-30 RX ORDER — SODIUM CHLORIDE 9 MG/ML
INJECTION, SOLUTION INTRAVENOUS PRN
Status: DISCONTINUED | OUTPATIENT
Start: 2023-11-30 | End: 2023-12-04 | Stop reason: HOSPADM

## 2023-11-30 RX ORDER — HEPARIN SODIUM 5000 [USP'U]/ML
5000 INJECTION, SOLUTION INTRAVENOUS; SUBCUTANEOUS 2 TIMES DAILY
Status: DISCONTINUED | OUTPATIENT
Start: 2023-11-30 | End: 2023-12-04 | Stop reason: HOSPADM

## 2023-11-30 RX ORDER — MAGNESIUM HYDROXIDE/ALUMINUM HYDROXICE/SIMETHICONE 120; 1200; 1200 MG/30ML; MG/30ML; MG/30ML
30 SUSPENSION ORAL EVERY 6 HOURS PRN
Status: DISCONTINUED | OUTPATIENT
Start: 2023-11-30 | End: 2023-12-04 | Stop reason: HOSPADM

## 2023-11-30 RX ADMIN — HEPARIN SODIUM 5000 UNITS: 5000 INJECTION INTRAVENOUS; SUBCUTANEOUS at 22:22

## 2023-11-30 RX ADMIN — CLONAZEPAM 4 MG: 1 TABLET ORAL at 22:22

## 2023-11-30 RX ADMIN — OLANZAPINE 5 MG: 5 TABLET, FILM COATED ORAL at 22:22

## 2023-11-30 RX ADMIN — CEFEPIME 2000 MG: 2 INJECTION, POWDER, FOR SOLUTION INTRAVENOUS at 16:09

## 2023-11-30 RX ADMIN — AMITRIPTYLINE HYDROCHLORIDE 100 MG: 25 TABLET, FILM COATED ORAL at 22:22

## 2023-11-30 RX ADMIN — SODIUM CHLORIDE, PRESERVATIVE FREE 10 ML: 5 INJECTION INTRAVENOUS at 22:28

## 2023-11-30 RX ADMIN — SODIUM CHLORIDE 1000 ML: 9 INJECTION, SOLUTION INTRAVENOUS at 13:35

## 2023-11-30 RX ADMIN — SODIUM CHLORIDE: 9 INJECTION, SOLUTION INTRAVENOUS at 16:08

## 2023-11-30 RX ADMIN — TUBERCULIN PURIFIED PROTEIN DERIVATIVE 5 UNITS: 5 INJECTION, SOLUTION INTRADERMAL at 22:22

## 2023-11-30 ASSESSMENT — PAIN SCALES - GENERAL: PAINLEVEL_OUTOF10: 0

## 2023-11-30 NOTE — H&P
Synthroid    Chronic indwelling urethral catheter (HCC)  Hx of right ureteral stent placement due to bladder injury  Hx of Pseudomonas urinary tract infection  [facility], monthly catheter changes. Patient had multiple admissions due to Pseudomonas UTI. ID was consulted during his last admission and recommended only checking his UA if there is suspicion for infection as he likely is colonized. -Carr catheter was exchanged in the ED. Continue with carr catheter care    Severe protein calorie malnutrition with hypoalbuminemia  Cachetic looking male with BMI of 13.64.  -Nutrition consulted    MDD/Bipolar Disorder : Amitriptyline, Xanax, Seroquel PRN    Fall: PT/OT    PT/OT evals and PPD needed/ordered? Yes  Diet: FULL  VTE prophylaxis: Lovenox  Code status: FULL    Additional Medical Decision Making factors:  Critical Care Time Spent: 35 minutes. Patient is critically ill; without intervention, there is a high probability of imminent acute organ impairment and/or life-threatening deterioration. Non-peripheral Lines and Tubes (if present):             Hospital Problems:  Principal Problem:    Rhabdomyolysis  Active Problems:    Attention to ileostomy [facility])    Anxiety    Colostomy care ([address])    Chronic indwelling Carr catheter    Bipolar disorder, unspecified ([address])    Acquired hypothyroidism    Adult failure to thrive  Resolved Problems:    * No resolved hospital problems.  *      Past History:     Past Medical History:   Diagnosis Date    Acute deep vein thrombosis (DVT) of non-extremity vein 5/20/2019    Anemia     Cancer (HCC)     Family history of malignant neoplasm of gastrointestinal tract     mother colon/ovarian cancer 67    Fecal incontinence     LAR syndrome    Carr catheter in place 2022    patient stated- \"catheter due to them nicking his bladder and it won't heal\"    Former cigarette smoker     History of rectal cancer     Hypothyroid     stable w/med    Infection and

## 2023-11-30 NOTE — ED PROVIDER NOTES
mmol/L    CO2 28 21 - 32 mmol/L    Anion Gap 3 2 - 11 mmol/L    Glucose 126 (H) 65 - 100 mg/dL    BUN 24 (H) 8 - 23 MG/DL    Creatinine 1.20 0.8 - 1.5 MG/DL    Est, Glom Filt Rate >60 >60 ml/min/1.73m2    Calcium 9.0 8.3 - 10.4 MG/DL    Total Bilirubin 0.5 0.2 - 1.1 MG/DL    ALT 43 12 - 65 U/L    AST 80 (H) 15 - 37 U/L    Alk Phosphatase 79 50 - 136 U/L    Total Protein 7.4 6.3 - 8.2 g/dL    Albumin 3.1 (L) 3.2 - 4.6 g/dL    Globulin 4.3 2.8 - 4.5 g/dL    Albumin/Globulin Ratio 0.7 0.4 - 1.6     Magnesium   Result Value Ref Range    Magnesium 2.0 1.8 - 2.4 mg/dL   CK   Result Value Ref Range    Total CK 1,867 (H) 21 - 215 U/L   Lactic Acid   Result Value Ref Range    Lactic Acid, Plasma 2.1 (H) 0.4 - 2.0 MMOL/L   POCT Glucose   Result Value Ref Range    POC Glucose 113 (H) 65 - 100 mg/dL    Performed by: PressleyToriADN         XR CHEST 1 VIEW   Final Result      XR PELVIS (1-2 VIEWS)   Final Result   No acute findings. CT HEAD WO CONTRAST   Final Result   Chronic appearing white matter change without acute intracranial   abnormality. Voice dictation software was used during the making of this note. This software is not perfect and grammatical and other typographical errors may be present. This note has not been completely proofread for errors.      Conrado Allison MD  11/30/23 3261

## 2023-11-30 NOTE — CARE COORDINATION
Chart review complete, CM met with pt at bedside,pt found laying on stretcher alert and oriented x4, pt lives alone in town home, per notes pt was found laying on floor after falling down steps, pt will wake and answer questions then will fall back to sleep. Pt was last admitted to [facility] for STR and per nursing does not want to be admitted to the hospital. Pt has neighbors that check on him, unsure if pt does his own shopping or how he completes ADLS at this time. Pt may require STR prior to dc home after this admission, pt is waiting on room placement at this time. Demographics, insurance and PCP confirmed with pt. Please consult CM staff when needed, pt may benefit from PT/OT evaluations. 11/30/23 7370   Service Assessment   Patient Orientation Alert and Oriented   Cognition Alert   History Provided By Patient;Medical Record   Primary Caregiver Self   Accompanied By/Relationship none   Support Systems Home Care Staff   PCP Verified by CM Yes   Prior Functional Level Independent in ADLs/IADLs   Current Functional Level Other (see comment)  (unknown)   Can patient return to prior living arrangement Unknown at present   Ability to make needs known: Good   Financial Resources Medicare   Community Resources ECF/Home Care   CM/SW Referral ADLs/IADLs; Activity/Exercise   Social/Functional History   Lives With Alone   Type of Home Apartment  (town home)   Home Layout Two level   [address] to enter with rails   Bathroom Shower/Tub Tub/Shower unit   [address], standard   ADL Assistance Independent   Ambulation Assistance Independent   Transfer Assistance Independent   Occupation Retired   Discharge Planning   Type of Residence Other (Comment)  (unknown)   Living Arrangements Alone   Current Services Prior To Admission [address] [facility] [address] Needed N/A   DME Ordered?  No   Potential Assistance Purchasing Medications

## 2023-11-30 NOTE — ED NOTES
Pt bed bath and viviana care provided. Temp sensing carr catheter inserted and warming blanket applied.      Idris Galloway RN  11/30/23 0493

## 2023-11-30 NOTE — ED NOTES
TRANSFER - OUT REPORT:    Verbal report given to MIGUEL Pfeiffer on [address]  being transferred to 2nd floor for routine progression of patient care       Report consisted of patient's Situation, Background, Assessment and   Recommendations(SBAR). Information from the following report(s) Nurse Handoff Report was reviewed with the receiving nurse. [place] Fall Assessment:                           Lines:   Peripheral IV 68/71/95 Right Cephalic (Active)        Opportunity for questions and clarification was provided.       Patient transported with:  Todd Vincent RN  11/30/23 7978

## 2023-11-30 NOTE — ED TRIAGE NOTES
Pt BIBA from home c/o a mechanical fall (pt reports he slid down the stairs) (-) LOC,(-)Head impact and was found down by home health. EMS reports pt was on the ground for approx. 12 hours, swelling to right side of face and multiple skin tears and abrasions to arms/elbows and bleeding at ostomy site. Pt is cold to the touch and covered in feces.  Per EMS vitals stable en route (unable to obtain a temp)

## 2023-12-01 ENCOUNTER — APPOINTMENT (OUTPATIENT)
Dept: GENERAL RADIOLOGY | Age: 78
DRG: 564 | End: 2023-12-01
Payer: MEDICARE

## 2023-12-01 PROBLEM — M25.511 RIGHT SHOULDER PAIN: Status: ACTIVE | Noted: 2023-12-01

## 2023-12-01 PROBLEM — R22.31 LOCALIZED SWELLING OF RIGHT UPPER EXTREMITY: Status: ACTIVE | Noted: 2023-12-01

## 2023-12-01 LAB
ANION GAP SERPL CALC-SCNC: 6 MMOL/L (ref 2–11)
BASOPHILS # BLD: 0 K/UL (ref 0–0.2)
BASOPHILS NFR BLD: 0 % (ref 0–2)
BUN SERPL-MCNC: 17 MG/DL (ref 8–23)
CALCIUM SERPL-MCNC: 8.4 MG/DL (ref 8.3–10.4)
CHLORIDE SERPL-SCNC: 108 MMOL/L (ref 101–110)
CO2 SERPL-SCNC: 23 MMOL/L (ref 21–32)
CREAT SERPL-MCNC: 1 MG/DL (ref 0.8–1.5)
DIFFERENTIAL METHOD BLD: ABNORMAL
EOSINOPHIL # BLD: 0 K/UL (ref 0–0.8)
EOSINOPHIL NFR BLD: 0 % (ref 0.5–7.8)
ERYTHROCYTE [DISTWIDTH] IN BLOOD BY AUTOMATED COUNT: 12.9 % (ref 11.9–14.6)
GLUCOSE SERPL-MCNC: 94 MG/DL (ref 65–100)
HCT VFR BLD AUTO: 36 % (ref 41.1–50.3)
HGB BLD-MCNC: 11.7 G/DL (ref 13.6–17.2)
IMM GRANULOCYTES # BLD AUTO: 0 K/UL (ref 0–0.5)
IMM GRANULOCYTES NFR BLD AUTO: 0 % (ref 0–5)
LYMPHOCYTES # BLD: 0.3 K/UL (ref 0.5–4.6)
LYMPHOCYTES NFR BLD: 4 % (ref 13–44)
MAGNESIUM SERPL-MCNC: 1.9 MG/DL (ref 1.8–2.4)
MCH RBC QN AUTO: 29.4 PG (ref 26.1–32.9)
MCHC RBC AUTO-ENTMCNC: 32.5 G/DL (ref 31.4–35)
MCV RBC AUTO: 90.5 FL (ref 82–102)
MM INDURATION, POC: 0 MM (ref 0–5)
MONOCYTES # BLD: 0.4 K/UL (ref 0.1–1.3)
MONOCYTES NFR BLD: 5 % (ref 4–12)
NEUTS SEG # BLD: 6.2 K/UL (ref 1.7–8.2)
NEUTS SEG NFR BLD: 91 % (ref 43–78)
NRBC # BLD: 0 K/UL (ref 0–0.2)
PLATELET # BLD AUTO: 182 K/UL (ref 150–450)
PMV BLD AUTO: 9.3 FL (ref 9.4–12.3)
POTASSIUM SERPL-SCNC: 3.4 MMOL/L (ref 3.5–5.1)
PPD, POC: NEGATIVE
RBC # BLD AUTO: 3.98 M/UL (ref 4.23–5.6)
SODIUM SERPL-SCNC: 137 MMOL/L (ref 133–143)
WBC # BLD AUTO: 6.9 K/UL (ref 4.3–11.1)

## 2023-12-01 PROCEDURE — 2580000003 HC RX 258: Performed by: EMERGENCY MEDICINE

## 2023-12-01 PROCEDURE — 97530 THERAPEUTIC ACTIVITIES: CPT

## 2023-12-01 PROCEDURE — 83735 ASSAY OF MAGNESIUM: CPT

## 2023-12-01 PROCEDURE — 1100000000 HC RM PRIVATE

## 2023-12-01 PROCEDURE — 2580000003 HC RX 258: Performed by: INTERNAL MEDICINE

## 2023-12-01 PROCEDURE — 97162 PT EVAL MOD COMPLEX 30 MIN: CPT

## 2023-12-01 PROCEDURE — 97535 SELF CARE MNGMENT TRAINING: CPT

## 2023-12-01 PROCEDURE — 80048 BASIC METABOLIC PNL TOTAL CA: CPT

## 2023-12-01 PROCEDURE — 85025 COMPLETE CBC W/AUTO DIFF WBC: CPT

## 2023-12-01 PROCEDURE — 6360000002 HC RX W HCPCS: Performed by: INTERNAL MEDICINE

## 2023-12-01 PROCEDURE — 36415 COLL VENOUS BLD VENIPUNCTURE: CPT

## 2023-12-01 PROCEDURE — 97166 OT EVAL MOD COMPLEX 45 MIN: CPT

## 2023-12-01 PROCEDURE — 73030 X-RAY EXAM OF SHOULDER: CPT

## 2023-12-01 PROCEDURE — 6370000000 HC RX 637 (ALT 250 FOR IP): Performed by: INTERNAL MEDICINE

## 2023-12-01 RX ADMIN — OLANZAPINE 5 MG: 5 TABLET, FILM COATED ORAL at 22:15

## 2023-12-01 RX ADMIN — CLONAZEPAM 4 MG: 1 TABLET ORAL at 23:03

## 2023-12-01 RX ADMIN — HEPARIN SODIUM 5000 UNITS: 5000 INJECTION INTRAVENOUS; SUBCUTANEOUS at 22:14

## 2023-12-01 RX ADMIN — SODIUM CHLORIDE: 9 INJECTION, SOLUTION INTRAVENOUS at 10:33

## 2023-12-01 RX ADMIN — LEVOTHYROXINE SODIUM 125 MCG: 0.12 TABLET ORAL at 06:03

## 2023-12-01 RX ADMIN — SODIUM CHLORIDE: 9 INJECTION, SOLUTION INTRAVENOUS at 18:24

## 2023-12-01 RX ADMIN — HEPARIN SODIUM 5000 UNITS: 5000 INJECTION INTRAVENOUS; SUBCUTANEOUS at 09:22

## 2023-12-01 RX ADMIN — SODIUM CHLORIDE, PRESERVATIVE FREE 10 ML: 5 INJECTION INTRAVENOUS at 22:15

## 2023-12-01 RX ADMIN — AMITRIPTYLINE HYDROCHLORIDE 100 MG: 25 TABLET, FILM COATED ORAL at 22:15

## 2023-12-01 NOTE — CONSULTS
Comprehensive Nutrition Assessment    Type and Reason for Visit: Initial, Consult  Unintentional Weight Loss (Hospitalists), Poor Intake/Appetite 5 or More Days (Hospitalists), and Best Practice Alert for BMI <18.5    Nutrition Recommendations/Plan:   Meals and Snacks:  Diet: Continue current order. Specify double portions  Nutrition Supplement Therapy:  Medical food supplement therapy:  Initiate Ensure High Protein three times per day (this provides 160 kcal and 16 grams protein per bottle)  Add daily weights. Malnutrition Assessment:  Malnutrition Status: Insufficient data (Limited diet hx, + weight loss but unable to validate, NFPE deferred)  NFPE: deferred d/t pt unable to participate at this time, too lethargic    Nutrition Assessment:  Nutrition History: Pt with hx of TPN 2019 following bowel surgeries. Nutrition hx per RD note 11/2022: \"Pt reports variable intake/appetite PTA- typically consumes 2 meals/day + snacks. Pt reports lowest weight of ~115lb prior to recent STR admission however states he gained 8-10lb during STR and now weighs ~123-125lb. Pt states prior to rectal cancer dx and colostomy in 2013 he typically weighed 155lb, reports prior active lifestyle. Pt reports prior intake of ONS in STR but none at home. \"    8/2023 encounter: At encounter today pt relates he continues with 2 meals per day am meal generally 2 packets of grits w 2 eggs and ground turkey, eats out one meal day last example 6 wings and 5-6 shrimps. In addition to this he consumes one serving of 25 gram whey protein mixed in water. He elects not to eat after 3pm secondary to concerns of volume of colostomy output and not wanting to make a mess while sleeping.    9/2023 encounter: Today pt tells me he has been eating less since discharge, maybe one meal per day which he generally orders or goes out to get. 12/1: Limited hx obtained. Pt reports he eats twice a day and drinks whey protein drink.  Pt unclear as to whether he has

## 2023-12-01 NOTE — CARE COORDINATION
RNCM: PT/OT recommending STR at discharge. Referral made per patient request. Patient accepted to [facility]. CM following.

## 2023-12-02 ENCOUNTER — APPOINTMENT (OUTPATIENT)
Dept: ULTRASOUND IMAGING | Age: 78
DRG: 564 | End: 2023-12-02
Payer: MEDICARE

## 2023-12-02 LAB
ANION GAP SERPL CALC-SCNC: 6 MMOL/L (ref 2–11)
BASOPHILS # BLD: 0 K/UL (ref 0–0.2)
BASOPHILS NFR BLD: 0 % (ref 0–2)
BUN SERPL-MCNC: 23 MG/DL (ref 8–23)
CALCIUM SERPL-MCNC: 8.1 MG/DL (ref 8.3–10.4)
CHLORIDE SERPL-SCNC: 109 MMOL/L (ref 101–110)
CK SERPL-CCNC: 597 U/L (ref 21–215)
CO2 SERPL-SCNC: 25 MMOL/L (ref 21–32)
CREAT SERPL-MCNC: 1.1 MG/DL (ref 0.8–1.5)
DIFFERENTIAL METHOD BLD: ABNORMAL
EOSINOPHIL # BLD: 0 K/UL (ref 0–0.8)
EOSINOPHIL NFR BLD: 1 % (ref 0.5–7.8)
ERYTHROCYTE [DISTWIDTH] IN BLOOD BY AUTOMATED COUNT: 13.2 % (ref 11.9–14.6)
GLUCOSE SERPL-MCNC: 95 MG/DL (ref 65–100)
HCT VFR BLD AUTO: 33.1 % (ref 41.1–50.3)
HGB BLD-MCNC: 10.7 G/DL (ref 13.6–17.2)
IMM GRANULOCYTES # BLD AUTO: 0 K/UL (ref 0–0.5)
IMM GRANULOCYTES NFR BLD AUTO: 0 % (ref 0–5)
LYMPHOCYTES # BLD: 0.4 K/UL (ref 0.5–4.6)
LYMPHOCYTES NFR BLD: 8 % (ref 13–44)
MCH RBC QN AUTO: 29.9 PG (ref 26.1–32.9)
MCHC RBC AUTO-ENTMCNC: 32.3 G/DL (ref 31.4–35)
MCV RBC AUTO: 92.5 FL (ref 82–102)
MM INDURATION, POC: 0 MM (ref 0–5)
MONOCYTES # BLD: 0.4 K/UL (ref 0.1–1.3)
MONOCYTES NFR BLD: 8 % (ref 4–12)
NEUTS SEG # BLD: 3.9 K/UL (ref 1.7–8.2)
NEUTS SEG NFR BLD: 83 % (ref 43–78)
NRBC # BLD: 0 K/UL (ref 0–0.2)
PLATELET # BLD AUTO: 161 K/UL (ref 150–450)
PMV BLD AUTO: 8.9 FL (ref 9.4–12.3)
POTASSIUM SERPL-SCNC: 3.6 MMOL/L (ref 3.5–5.1)
PPD, POC: NEGATIVE
RBC # BLD AUTO: 3.58 M/UL (ref 4.23–5.6)
SODIUM SERPL-SCNC: 140 MMOL/L (ref 133–143)
WBC # BLD AUTO: 4.8 K/UL (ref 4.3–11.1)

## 2023-12-02 PROCEDURE — 80048 BASIC METABOLIC PNL TOTAL CA: CPT

## 2023-12-02 PROCEDURE — 2580000003 HC RX 258: Performed by: EMERGENCY MEDICINE

## 2023-12-02 PROCEDURE — 85025 COMPLETE CBC W/AUTO DIFF WBC: CPT

## 2023-12-02 PROCEDURE — 2580000003 HC RX 258: Performed by: INTERNAL MEDICINE

## 2023-12-02 PROCEDURE — 6370000000 HC RX 637 (ALT 250 FOR IP): Performed by: INTERNAL MEDICINE

## 2023-12-02 PROCEDURE — 6360000002 HC RX W HCPCS: Performed by: INTERNAL MEDICINE

## 2023-12-02 PROCEDURE — 82550 ASSAY OF CK (CPK): CPT

## 2023-12-02 PROCEDURE — 36415 COLL VENOUS BLD VENIPUNCTURE: CPT

## 2023-12-02 PROCEDURE — 1100000000 HC RM PRIVATE

## 2023-12-02 RX ADMIN — SODIUM CHLORIDE, PRESERVATIVE FREE 10 ML: 5 INJECTION INTRAVENOUS at 21:22

## 2023-12-02 RX ADMIN — HEPARIN SODIUM 5000 UNITS: 5000 INJECTION INTRAVENOUS; SUBCUTANEOUS at 21:22

## 2023-12-02 RX ADMIN — CLONAZEPAM 4 MG: 1 TABLET ORAL at 22:03

## 2023-12-02 RX ADMIN — SODIUM CHLORIDE: 9 INJECTION, SOLUTION INTRAVENOUS at 18:07

## 2023-12-02 RX ADMIN — OLANZAPINE 5 MG: 5 TABLET, FILM COATED ORAL at 21:22

## 2023-12-02 RX ADMIN — HEPARIN SODIUM 5000 UNITS: 5000 INJECTION INTRAVENOUS; SUBCUTANEOUS at 08:54

## 2023-12-02 RX ADMIN — SODIUM CHLORIDE: 9 INJECTION, SOLUTION INTRAVENOUS at 01:53

## 2023-12-02 RX ADMIN — SODIUM CHLORIDE, PRESERVATIVE FREE 10 ML: 5 INJECTION INTRAVENOUS at 08:54

## 2023-12-02 RX ADMIN — SODIUM CHLORIDE: 9 INJECTION, SOLUTION INTRAVENOUS at 09:08

## 2023-12-02 RX ADMIN — AMITRIPTYLINE HYDROCHLORIDE 100 MG: 25 TABLET, FILM COATED ORAL at 21:22

## 2023-12-02 RX ADMIN — LEVOTHYROXINE SODIUM 125 MCG: 0.12 TABLET ORAL at 06:06

## 2023-12-02 ASSESSMENT — PAIN SCALES - GENERAL: PAINLEVEL_OUTOF10: 0

## 2023-12-02 NOTE — PLAN OF CARE
Problem: Discharge Planning  Goal: Discharge to home or other facility with appropriate resources  Outcome: Progressing  Flowsheets (Taken 12/1/2023 2134 by Anshul Prajapati, RN)  Discharge to home or other facility with appropriate resources: Identify barriers to discharge with patient and caregiver     Problem: Pain  Goal: Verbalizes/displays adequate comfort level or baseline comfort level  Outcome: Progressing  Flowsheets (Taken 12/1/2023 2133 by Anshul Prajapati, RN)  Verbalizes/displays adequate comfort level or baseline comfort level:   Encourage patient to monitor pain and request assistance   Assess pain using appropriate pain scale   Administer analgesics based on type and severity of pain and evaluate response   Implement non-pharmacological measures as appropriate and evaluate response   Consider cultural and social influences on pain and pain management     Problem: Skin/Tissue Integrity  Goal: Absence of new skin breakdown  Description: 1. Monitor for areas of redness and/or skin breakdown  2. Assess vascular access sites hourly  3. Every 4-6 hours minimum:  Change oxygen saturation probe site  4. Every 4-6 hours:  If on nasal continuous positive airway pressure, respiratory therapy assess nares and determine need for appliance change or resting period.   Outcome: Progressing     Problem: Safety - Adult  Goal: Free from fall injury  Outcome: Progressing  Flowsheets  Taken 12/2/2023 0750 by Tessa Blake RN  Free From Fall Injury: Instruct family/caregiver on patient safety  Taken 12/1/2023 2140 by Anshul Prajapati RN  Free From Fall Injury: Instruct family/caregiver on patient safety

## 2023-12-03 ENCOUNTER — APPOINTMENT (OUTPATIENT)
Dept: ULTRASOUND IMAGING | Age: 78
DRG: 564 | End: 2023-12-03
Payer: MEDICARE

## 2023-12-03 LAB
ANION GAP SERPL CALC-SCNC: 3 MMOL/L (ref 2–11)
BASOPHILS # BLD: 0 K/UL (ref 0–0.2)
BASOPHILS NFR BLD: 0 % (ref 0–2)
BUN SERPL-MCNC: 27 MG/DL (ref 8–23)
CALCIUM SERPL-MCNC: 7.9 MG/DL (ref 8.3–10.4)
CHLORIDE SERPL-SCNC: 108 MMOL/L (ref 101–110)
CO2 SERPL-SCNC: 29 MMOL/L (ref 21–32)
CREAT SERPL-MCNC: 1.1 MG/DL (ref 0.8–1.5)
DIFFERENTIAL METHOD BLD: ABNORMAL
EOSINOPHIL # BLD: 0.1 K/UL (ref 0–0.8)
EOSINOPHIL NFR BLD: 1 % (ref 0.5–7.8)
ERYTHROCYTE [DISTWIDTH] IN BLOOD BY AUTOMATED COUNT: 13.1 % (ref 11.9–14.6)
GLUCOSE SERPL-MCNC: 83 MG/DL (ref 65–100)
HCT VFR BLD AUTO: 32.8 % (ref 41.1–50.3)
HGB BLD-MCNC: 10.3 G/DL (ref 13.6–17.2)
IMM GRANULOCYTES # BLD AUTO: 0 K/UL (ref 0–0.5)
IMM GRANULOCYTES NFR BLD AUTO: 0 % (ref 0–5)
LYMPHOCYTES # BLD: 0.4 K/UL (ref 0.5–4.6)
LYMPHOCYTES NFR BLD: 7 % (ref 13–44)
MCH RBC QN AUTO: 29.3 PG (ref 26.1–32.9)
MCHC RBC AUTO-ENTMCNC: 31.4 G/DL (ref 31.4–35)
MCV RBC AUTO: 93.2 FL (ref 82–102)
MM INDURATION, POC: 0 MM (ref 0–5)
MONOCYTES # BLD: 0.5 K/UL (ref 0.1–1.3)
MONOCYTES NFR BLD: 9 % (ref 4–12)
NEUTS SEG # BLD: 5 K/UL (ref 1.7–8.2)
NEUTS SEG NFR BLD: 83 % (ref 43–78)
NRBC # BLD: 0 K/UL (ref 0–0.2)
PLATELET # BLD AUTO: 166 K/UL (ref 150–450)
PMV BLD AUTO: 8.9 FL (ref 9.4–12.3)
POTASSIUM SERPL-SCNC: 3.6 MMOL/L (ref 3.5–5.1)
PPD, POC: NEGATIVE
RBC # BLD AUTO: 3.52 M/UL (ref 4.23–5.6)
SODIUM SERPL-SCNC: 140 MMOL/L (ref 133–143)
WBC # BLD AUTO: 6.1 K/UL (ref 4.3–11.1)

## 2023-12-03 PROCEDURE — 80048 BASIC METABOLIC PNL TOTAL CA: CPT

## 2023-12-03 PROCEDURE — 93971 EXTREMITY STUDY: CPT

## 2023-12-03 PROCEDURE — 36415 COLL VENOUS BLD VENIPUNCTURE: CPT

## 2023-12-03 PROCEDURE — 6370000000 HC RX 637 (ALT 250 FOR IP): Performed by: INTERNAL MEDICINE

## 2023-12-03 PROCEDURE — 2580000003 HC RX 258: Performed by: EMERGENCY MEDICINE

## 2023-12-03 PROCEDURE — 2580000003 HC RX 258: Performed by: INTERNAL MEDICINE

## 2023-12-03 PROCEDURE — 85025 COMPLETE CBC W/AUTO DIFF WBC: CPT

## 2023-12-03 PROCEDURE — 6370000000 HC RX 637 (ALT 250 FOR IP): Performed by: FAMILY MEDICINE

## 2023-12-03 PROCEDURE — 2580000003 HC RX 258: Performed by: FAMILY MEDICINE

## 2023-12-03 PROCEDURE — 1100000000 HC RM PRIVATE

## 2023-12-03 PROCEDURE — 6360000002 HC RX W HCPCS: Performed by: INTERNAL MEDICINE

## 2023-12-03 RX ORDER — CLONAZEPAM 1 MG/1
4 TABLET ORAL NIGHTLY PRN
Status: DISCONTINUED | OUTPATIENT
Start: 2023-12-03 | End: 2023-12-04 | Stop reason: HOSPADM

## 2023-12-03 RX ORDER — CARVEDILOL 6.25 MG/1
6.25 TABLET ORAL 2 TIMES DAILY WITH MEALS
Status: DISCONTINUED | OUTPATIENT
Start: 2023-12-03 | End: 2023-12-04 | Stop reason: HOSPADM

## 2023-12-03 RX ORDER — OXYBUTYNIN CHLORIDE 10 MG/1
10 TABLET, EXTENDED RELEASE ORAL NIGHTLY
Status: DISCONTINUED | OUTPATIENT
Start: 2023-12-03 | End: 2023-12-04 | Stop reason: HOSPADM

## 2023-12-03 RX ORDER — CLONAZEPAM 1 MG/1
2 TABLET ORAL NIGHTLY PRN
Status: DISCONTINUED | OUTPATIENT
Start: 2023-12-03 | End: 2023-12-03

## 2023-12-03 RX ADMIN — HYOSCYAMINE SULFATE 125 MCG: 0.12 TABLET ORAL; SUBLINGUAL at 22:09

## 2023-12-03 RX ADMIN — CARVEDILOL 6.25 MG: 6.25 TABLET, FILM COATED ORAL at 17:12

## 2023-12-03 RX ADMIN — SODIUM CHLORIDE: 9 INJECTION, SOLUTION INTRAVENOUS at 23:12

## 2023-12-03 RX ADMIN — LEVOTHYROXINE SODIUM 125 MCG: 0.12 TABLET ORAL at 06:08

## 2023-12-03 RX ADMIN — HEPARIN SODIUM 5000 UNITS: 5000 INJECTION INTRAVENOUS; SUBCUTANEOUS at 08:14

## 2023-12-03 RX ADMIN — OXYBUTYNIN CHLORIDE 10 MG: 10 TABLET, EXTENDED RELEASE ORAL at 21:13

## 2023-12-03 RX ADMIN — QUETIAPINE FUMARATE 25 MG: 25 TABLET ORAL at 22:14

## 2023-12-03 RX ADMIN — SODIUM CHLORIDE, PRESERVATIVE FREE 10 ML: 5 INJECTION INTRAVENOUS at 21:13

## 2023-12-03 RX ADMIN — AMITRIPTYLINE HYDROCHLORIDE 100 MG: 25 TABLET, FILM COATED ORAL at 21:13

## 2023-12-03 RX ADMIN — SODIUM CHLORIDE: 9 INJECTION, SOLUTION INTRAVENOUS at 02:24

## 2023-12-03 RX ADMIN — CARVEDILOL 6.25 MG: 6.25 TABLET, FILM COATED ORAL at 10:14

## 2023-12-03 RX ADMIN — CLONAZEPAM 4 MG: 1 TABLET ORAL at 22:14

## 2023-12-03 RX ADMIN — HEPARIN SODIUM 5000 UNITS: 5000 INJECTION INTRAVENOUS; SUBCUTANEOUS at 21:13

## 2023-12-03 RX ADMIN — OLANZAPINE 5 MG: 5 TABLET, FILM COATED ORAL at 21:13

## 2023-12-03 RX ADMIN — SODIUM CHLORIDE, PRESERVATIVE FREE 10 ML: 5 INJECTION INTRAVENOUS at 08:15

## 2023-12-03 RX ADMIN — FAMOTIDINE 10 MG: 20 TABLET ORAL at 22:09

## 2023-12-03 ASSESSMENT — PAIN SCALES - GENERAL: PAINLEVEL_OUTOF10: 0

## 2023-12-04 VITALS
DIASTOLIC BLOOD PRESSURE: 78 MMHG | RESPIRATION RATE: 17 BRPM | SYSTOLIC BLOOD PRESSURE: 137 MMHG | BODY MASS INDEX: 19.34 KG/M2 | OXYGEN SATURATION: 99 % | HEIGHT: 68 IN | WEIGHT: 127.6 LBS | TEMPERATURE: 97.9 F | HEART RATE: 93 BPM

## 2023-12-04 LAB
ANION GAP SERPL CALC-SCNC: 4 MMOL/L (ref 2–11)
BASOPHILS # BLD: 0 K/UL (ref 0–0.2)
BASOPHILS NFR BLD: 1 % (ref 0–2)
BUN SERPL-MCNC: 29 MG/DL (ref 8–23)
CALCIUM SERPL-MCNC: 8.5 MG/DL (ref 8.3–10.4)
CHLORIDE SERPL-SCNC: 108 MMOL/L (ref 101–110)
CK SERPL-CCNC: 264 U/L (ref 21–215)
CO2 SERPL-SCNC: 27 MMOL/L (ref 21–32)
CREAT SERPL-MCNC: 1 MG/DL (ref 0.8–1.5)
DIFFERENTIAL METHOD BLD: ABNORMAL
EOSINOPHIL # BLD: 0.1 K/UL (ref 0–0.8)
EOSINOPHIL NFR BLD: 2 % (ref 0.5–7.8)
ERYTHROCYTE [DISTWIDTH] IN BLOOD BY AUTOMATED COUNT: 13 % (ref 11.9–14.6)
GLUCOSE SERPL-MCNC: 95 MG/DL (ref 65–100)
HCT VFR BLD AUTO: 33 % (ref 41.1–50.3)
HGB BLD-MCNC: 10.7 G/DL (ref 13.6–17.2)
IMM GRANULOCYTES # BLD AUTO: 0 K/UL (ref 0–0.5)
IMM GRANULOCYTES NFR BLD AUTO: 1 % (ref 0–5)
LYMPHOCYTES # BLD: 0.5 K/UL (ref 0.5–4.6)
LYMPHOCYTES NFR BLD: 8 % (ref 13–44)
MCH RBC QN AUTO: 30.4 PG (ref 26.1–32.9)
MCHC RBC AUTO-ENTMCNC: 32.4 G/DL (ref 31.4–35)
MCV RBC AUTO: 93.8 FL (ref 82–102)
MONOCYTES # BLD: 0.6 K/UL (ref 0.1–1.3)
MONOCYTES NFR BLD: 11 % (ref 4–12)
NEUTS SEG # BLD: 4.4 K/UL (ref 1.7–8.2)
NEUTS SEG NFR BLD: 77 % (ref 43–78)
NRBC # BLD: 0 K/UL (ref 0–0.2)
PLATELET # BLD AUTO: 187 K/UL (ref 150–450)
PMV BLD AUTO: 9.3 FL (ref 9.4–12.3)
POTASSIUM SERPL-SCNC: 3.8 MMOL/L (ref 3.5–5.1)
RBC # BLD AUTO: 3.52 M/UL (ref 4.23–5.6)
SODIUM SERPL-SCNC: 139 MMOL/L (ref 133–143)
WBC # BLD AUTO: 5.6 K/UL (ref 4.3–11.1)

## 2023-12-04 PROCEDURE — 6360000002 HC RX W HCPCS: Performed by: INTERNAL MEDICINE

## 2023-12-04 PROCEDURE — 6370000000 HC RX 637 (ALT 250 FOR IP): Performed by: FAMILY MEDICINE

## 2023-12-04 PROCEDURE — 51702 INSERT TEMP BLADDER CATH: CPT

## 2023-12-04 PROCEDURE — 85025 COMPLETE CBC W/AUTO DIFF WBC: CPT

## 2023-12-04 PROCEDURE — 36415 COLL VENOUS BLD VENIPUNCTURE: CPT

## 2023-12-04 PROCEDURE — 97530 THERAPEUTIC ACTIVITIES: CPT

## 2023-12-04 PROCEDURE — 6370000000 HC RX 637 (ALT 250 FOR IP): Performed by: INTERNAL MEDICINE

## 2023-12-04 PROCEDURE — 80048 BASIC METABOLIC PNL TOTAL CA: CPT

## 2023-12-04 PROCEDURE — 2580000003 HC RX 258: Performed by: INTERNAL MEDICINE

## 2023-12-04 PROCEDURE — 82550 ASSAY OF CK (CPK): CPT

## 2023-12-04 RX ORDER — HYDRALAZINE HYDROCHLORIDE 20 MG/ML
5 INJECTION INTRAMUSCULAR; INTRAVENOUS EVERY 6 HOURS PRN
Status: DISCONTINUED | OUTPATIENT
Start: 2023-12-04 | End: 2023-12-04 | Stop reason: HOSPADM

## 2023-12-04 RX ORDER — CLONAZEPAM 2 MG/1
4 TABLET ORAL NIGHTLY PRN
Qty: 6 TABLET | Refills: 0 | Status: SHIPPED | OUTPATIENT
Start: 2023-12-04 | End: 2023-12-07

## 2023-12-04 RX ADMIN — HEPARIN SODIUM 5000 UNITS: 5000 INJECTION INTRAVENOUS; SUBCUTANEOUS at 08:48

## 2023-12-04 RX ADMIN — LEVOTHYROXINE SODIUM 125 MCG: 0.12 TABLET ORAL at 06:00

## 2023-12-04 RX ADMIN — CARVEDILOL 6.25 MG: 6.25 TABLET, FILM COATED ORAL at 08:47

## 2023-12-04 RX ADMIN — SODIUM CHLORIDE, PRESERVATIVE FREE 10 ML: 5 INJECTION INTRAVENOUS at 08:47

## 2023-12-04 NOTE — DISCHARGE SUMMARY
Hospitalist Discharge Summary   Admit Date:  2023 12:04 PM   DC Note date: 2023  Name:  Higinio Mejia   Age:  78 y.o. Sex:  male  :  1945   MRN:  [id number]   Room:    PCP:  Dominique Chamberlain DO    Presenting Complaint: Fall     Initial Admission Diagnosis: Rhabdomyolysis [M62.82]  Generalized weakness [R53.1]  Acute encephalopathy [G93.40]  Hypothermia, initial encounter [T68. XXXA]  Fall down stairs, initial encounter [W10.8XXA]  Traumatic rhabdomyolysis, initial encounter ([address]) Aster Deven. 6XXA]     Problem List for this Hospitalization (present on admission):    Principal Problem:    Rhabdomyolysis  Active Problems:    Attention to ileostomy ([address])    Severe protein-calorie malnutrition ([address])    Anxiety    Colostomy care ([address])    Chronic indwelling John catheter    Bipolar disorder, unspecified ([address])    Acquired hypothyroidism    Adult failure to thrive    Localized swelling of right upper extremity    Right shoulder pain  Resolved Problems:    * No resolved hospital problems. *    H&P  Higinio Mejia is a 78 y.o. male with medical history of rectal cancer status post colostomy, chronic John catheter with recurrent UTIs, MDD/bipolar disorder, severe protein evaluation, chronic nausea who recently was brought in by EMS this morning after being found down at home. Per report, patient l slid down the stairs and was unable to get up. Denies loss of consciousness or head injury. Patient was approximate down on the floor for about 12 hours and was found by his home aide this morning. He is alert and awake, oriented x2 . HE was not able to tell me what had happened. Very lethargic during my encounter. He lives alone and unclear if he has been taking his meds. On arrival to the emergency room, patient was hypothermic to 90F and covered in feces due to dislodgment of colostomy bag. Patient had swelling to right side of his face and multiple since yesterday patient to fall.

## 2023-12-04 NOTE — CARE COORDINATION
Patient with discharge orders for today. Patient discharging to  [address] [facility] to provide transport. Pick-up time: 1400. Report line information given to primary RN. Patient has met all treatment goals and milestones for discharge. Patient/family in agreement with discharge plan. CM following until patient is discharged. 12/04/23 1200   Service Assessment   Patient Orientation Alert and [address] Discharge   Transition of Care Consult (CM Consult) Discharge Planning   Services [facility] Discharge [facility] (SNF); Transport   [employer] Information Provided? No   Mode of Transport at Discharge Self   Confirm Follow Up Transport Self   Condition of Participation: Discharge Planning   The Plan for Transition of Care is related to the following treatment goals: Return to baseline   The Patient and/or Patient Representative was provided with a Choice of Provider? Patient   The Patient and/Or Patient Representative agree with the Discharge Plan? Yes   Freedom of Choice list was provided with basic dialogue that supports the patient's individualized plan of care/goals, treatment preferences, and shares the quality data associated with the providers?   Yes

## 2023-12-05 ENCOUNTER — CARE COORDINATION (OUTPATIENT)
Dept: CARE COORDINATION | Facility: CLINIC | Age: 78
End: 2023-12-05

## 2023-12-05 NOTE — CARE COORDINATION
Transition of care outreach postponed for 7 days due to patient's discharge to [facility].

## 2023-12-12 ENCOUNTER — CARE COORDINATION (OUTPATIENT)
Dept: CARE COORDINATION | Facility: CLINIC | Age: 78
End: 2023-12-12

## 2023-12-12 NOTE — CARE COORDINATION
Care Transitions Post-Acute Facility Update Call    2023    Patient: Priti Nolasco Patient : 1945   MRN: [id number]  Reason for Admission: Rhabdomyolysis  Discharge Date: 23 RARS: Readmission Risk Score: 22.1         Care Transitions Post Acute Facility Update    Care Transitions Interventions  Per nurse patient remains at facility [facility]). Continues to work with therapy with no plans for discharge at time of call. Will continue to follow up per protocol.

## 2023-12-22 PROBLEM — N13.1 HYDRONEPHROSIS WITH URETERAL STRICTURE: Status: ACTIVE | Noted: 2023-12-21

## 2023-12-23 ENCOUNTER — PREP FOR PROCEDURE (OUTPATIENT)
Dept: UROLOGY | Age: 78
End: 2023-12-23

## 2023-12-23 DIAGNOSIS — N13.5 URETERAL OBSTRUCTION, RIGHT: Primary | ICD-10-CM

## 2023-12-23 RX ORDER — SODIUM CHLORIDE 0.9 % (FLUSH) 0.9 %
5-40 SYRINGE (ML) INJECTION PRN
OUTPATIENT
Start: 2023-12-23

## 2023-12-23 RX ORDER — SODIUM CHLORIDE 0.9 % (FLUSH) 0.9 %
5-40 SYRINGE (ML) INJECTION EVERY 12 HOURS SCHEDULED
OUTPATIENT
Start: 2023-12-23

## 2023-12-23 RX ORDER — SODIUM CHLORIDE 9 MG/ML
INJECTION, SOLUTION INTRAVENOUS PRN
OUTPATIENT
Start: 2023-12-23

## 2023-12-27 ENCOUNTER — CARE COORDINATION (OUTPATIENT)
Dept: CARE COORDINATION | Facility: CLINIC | Age: 78
End: 2023-12-27

## 2023-12-27 NOTE — CARE COORDINATION
Care Transitions Post-Acute Facility Discharge Call    2023    Patient: Teresita Nolasco Patient : 1945   MRN: [id number]  Reason for Admission: Rhabdomyolysis  Discharge Date: 2023 RARS: Readmission Risk Score: 22.1    Spoke with staff at facility [facility] Previously [facility]). Continues to work with therapy with no plans for discharge at time of call. Will continue to follow up per protocol.       Future Appointments   Date Time Provider [address] [place] [address] Ct   3/18/2024  9:45 AM TRIM LAB RESOURCE TRIM [facility] AMB   3/21/2024  9:30 AM Zoe STANFORD DO TRIM [facility] AMB   2024  8:45 AM SFE ASSESSMENT  [address]ang

## 2023-12-31 ENCOUNTER — HOSPITAL ENCOUNTER (INPATIENT)
Age: 78
LOS: 5 days | Discharge: NURSING, REHAB OR LONG-TERM CARE | End: 2024-01-05
Attending: EMERGENCY MEDICINE | Admitting: INTERNAL MEDICINE
Payer: MEDICARE

## 2023-12-31 ENCOUNTER — HOSPITAL ENCOUNTER (EMERGENCY)
Age: 78
Discharge: HOME | End: 2023-12-31

## 2023-12-31 ENCOUNTER — APPOINTMENT (OUTPATIENT)
Dept: GENERAL RADIOLOGY | Age: 78
End: 2023-12-31
Payer: MEDICARE

## 2023-12-31 ENCOUNTER — APPOINTMENT (OUTPATIENT)
Dept: CT IMAGING | Age: 78
End: 2023-12-31
Payer: MEDICARE

## 2023-12-31 DIAGNOSIS — N39.0 URINARY TRACT INFECTION ASSOCIATED WITH INDWELLING URETHRAL CATHETER, INITIAL ENCOUNTER (HCC): ICD-10-CM

## 2023-12-31 DIAGNOSIS — R11.2 NAUSEA AND VOMITING, UNSPECIFIED VOMITING TYPE: Primary | ICD-10-CM

## 2023-12-31 DIAGNOSIS — K56.50 SMALL BOWEL OBSTRUCTION DUE TO ADHESIONS (HCC): ICD-10-CM

## 2023-12-31 DIAGNOSIS — E86.0 DEHYDRATION: ICD-10-CM

## 2023-12-31 DIAGNOSIS — T83.511A URINARY TRACT INFECTION ASSOCIATED WITH INDWELLING URETHRAL CATHETER, INITIAL ENCOUNTER (HCC): ICD-10-CM

## 2023-12-31 PROBLEM — R10.9 ABDOMINAL PAIN: Status: ACTIVE | Noted: 2023-12-31

## 2023-12-31 PROBLEM — S37.20XA BLADDER INJURY WITH OPEN WOUND INTO CAVITY: Status: ACTIVE | Noted: 2021-09-26

## 2023-12-31 PROBLEM — N17.9 AKI (ACUTE KIDNEY INJURY) (HCC): Status: ACTIVE | Noted: 2023-12-31

## 2023-12-31 PROBLEM — R33.9 URINARY RETENTION: Status: ACTIVE | Noted: 2021-09-15

## 2023-12-31 LAB
ALBUMIN SERPL-MCNC: 2.9 G/DL (ref 3.2–4.6)
ALBUMIN/GLOB SERPL: 0.6 (ref 0.4–1.6)
ALP SERPL-CCNC: 105 U/L (ref 50–136)
ALT SERPL-CCNC: 41 U/L (ref 12–65)
ANION GAP SERPL CALC-SCNC: 4 MMOL/L (ref 2–11)
AST SERPL-CCNC: 22 U/L (ref 15–37)
B PERT DNA SPEC QL NAA+PROBE: NOT DETECTED
BASOPHILS # BLD: 0 K/UL (ref 0–0.2)
BASOPHILS NFR BLD: 0 % (ref 0–2)
BILIRUB SERPL-MCNC: 0.3 MG/DL (ref 0.2–1.1)
BORDETELLA PARAPERTUSSIS BY PCR: NOT DETECTED
BUN SERPL-MCNC: 57 MG/DL (ref 8–23)
C PNEUM DNA SPEC QL NAA+PROBE: NOT DETECTED
CALCIUM SERPL-MCNC: 9 MG/DL (ref 8.3–10.4)
CHLORIDE SERPL-SCNC: 108 MMOL/L (ref 103–113)
CO2 SERPL-SCNC: 21 MMOL/L (ref 21–32)
CREAT SERPL-MCNC: 1.5 MG/DL (ref 0.8–1.5)
DIFFERENTIAL METHOD BLD: ABNORMAL
EOSINOPHIL # BLD: 0 K/UL (ref 0–0.8)
EOSINOPHIL NFR BLD: 0 % (ref 0.5–7.8)
ERYTHROCYTE [DISTWIDTH] IN BLOOD BY AUTOMATED COUNT: 12.7 % (ref 11.9–14.6)
FLUAV SUBTYP SPEC NAA+PROBE: NOT DETECTED
FLUBV RNA SPEC QL NAA+PROBE: NOT DETECTED
GLOBULIN SER CALC-MCNC: 4.9 G/DL (ref 2.8–4.5)
GLUCOSE SERPL-MCNC: 109 MG/DL (ref 65–100)
HADV DNA SPEC QL NAA+PROBE: NOT DETECTED
HCOV 229E RNA SPEC QL NAA+PROBE: NOT DETECTED
HCOV HKU1 RNA SPEC QL NAA+PROBE: NOT DETECTED
HCOV NL63 RNA SPEC QL NAA+PROBE: NOT DETECTED
HCOV OC43 RNA SPEC QL NAA+PROBE: NOT DETECTED
HCT VFR BLD AUTO: 39.4 % (ref 41.1–50.3)
HGB BLD-MCNC: 12.4 G/DL (ref 13.6–17.2)
HMPV RNA SPEC QL NAA+PROBE: NOT DETECTED
HPIV1 RNA SPEC QL NAA+PROBE: NOT DETECTED
HPIV2 RNA SPEC QL NAA+PROBE: NOT DETECTED
HPIV3 RNA SPEC QL NAA+PROBE: NOT DETECTED
HPIV4 RNA SPEC QL NAA+PROBE: NOT DETECTED
IMM GRANULOCYTES # BLD AUTO: 0.1 K/UL (ref 0–0.5)
IMM GRANULOCYTES NFR BLD AUTO: 1 % (ref 0–5)
LACTATE SERPL-SCNC: 1.2 MMOL/L (ref 0.4–2)
LIPASE SERPL-CCNC: 67 U/L (ref 73–393)
LYMPHOCYTES # BLD: 0.7 K/UL (ref 0.5–4.6)
LYMPHOCYTES NFR BLD: 8 % (ref 13–44)
M PNEUMO DNA SPEC QL NAA+PROBE: NOT DETECTED
MCH RBC QN AUTO: 28.2 PG (ref 26.1–32.9)
MCHC RBC AUTO-ENTMCNC: 31.5 G/DL (ref 31.4–35)
MCV RBC AUTO: 89.7 FL (ref 82–102)
MONOCYTES # BLD: 0.6 K/UL (ref 0.1–1.3)
MONOCYTES NFR BLD: 7 % (ref 4–12)
NEUTS SEG # BLD: 7.9 K/UL (ref 1.7–8.2)
NEUTS SEG NFR BLD: 85 % (ref 43–78)
NRBC # BLD: 0 K/UL (ref 0–0.2)
PLATELET # BLD AUTO: 406 K/UL (ref 150–450)
PMV BLD AUTO: 8.4 FL (ref 9.4–12.3)
POTASSIUM SERPL-SCNC: 4.3 MMOL/L (ref 3.5–5.1)
PROT SERPL-MCNC: 7.8 G/DL (ref 6.3–8.2)
RBC # BLD AUTO: 4.39 M/UL (ref 4.23–5.6)
RSV RNA SPEC QL NAA+PROBE: DETECTED
RV+EV RNA SPEC QL NAA+PROBE: NOT DETECTED
SARS-COV-2 RNA RESP QL NAA+PROBE: NOT DETECTED
SODIUM SERPL-SCNC: 133 MMOL/L (ref 136–146)
WBC # BLD AUTO: 9.3 K/UL (ref 4.3–11.1)

## 2023-12-31 PROCEDURE — 2500000003 HC RX 250 WO HCPCS: Performed by: INTERNAL MEDICINE

## 2023-12-31 PROCEDURE — 74018 RADEX ABDOMEN 1 VIEW: CPT

## 2023-12-31 PROCEDURE — 6360000002 HC RX W HCPCS: Performed by: FAMILY MEDICINE

## 2023-12-31 PROCEDURE — 6360000004 HC RX CONTRAST MEDICATION: Performed by: EMERGENCY MEDICINE

## 2023-12-31 PROCEDURE — 96361 HYDRATE IV INFUSION ADD-ON: CPT

## 2023-12-31 PROCEDURE — A4216 STERILE WATER/SALINE, 10 ML: HCPCS | Performed by: INTERNAL MEDICINE

## 2023-12-31 PROCEDURE — 85025 COMPLETE CBC W/AUTO DIFF WBC: CPT

## 2023-12-31 PROCEDURE — 87040 BLOOD CULTURE FOR BACTERIA: CPT

## 2023-12-31 PROCEDURE — 87086 URINE CULTURE/COLONY COUNT: CPT

## 2023-12-31 PROCEDURE — 83690 ASSAY OF LIPASE: CPT

## 2023-12-31 PROCEDURE — A4216 STERILE WATER/SALINE, 10 ML: HCPCS | Performed by: FAMILY MEDICINE

## 2023-12-31 PROCEDURE — 83605 ASSAY OF LACTIC ACID: CPT

## 2023-12-31 PROCEDURE — 2580000003 HC RX 258: Performed by: FAMILY MEDICINE

## 2023-12-31 PROCEDURE — 1100000000 HC RM PRIVATE

## 2023-12-31 PROCEDURE — 74177 CT ABD & PELVIS W/CONTRAST: CPT

## 2023-12-31 PROCEDURE — 0202U NFCT DS 22 TRGT SARS-COV-2: CPT

## 2023-12-31 PROCEDURE — 6360000002 HC RX W HCPCS: Performed by: INTERNAL MEDICINE

## 2023-12-31 PROCEDURE — 2580000003 HC RX 258: Performed by: EMERGENCY MEDICINE

## 2023-12-31 PROCEDURE — 99285 EMERGENCY DEPT VISIT HI MDM: CPT

## 2023-12-31 PROCEDURE — 96374 THER/PROPH/DIAG INJ IV PUSH: CPT

## 2023-12-31 PROCEDURE — 80053 COMPREHEN METABOLIC PANEL: CPT

## 2023-12-31 PROCEDURE — 2580000003 HC RX 258: Performed by: INTERNAL MEDICINE

## 2023-12-31 PROCEDURE — 81001 URINALYSIS AUTO W/SCOPE: CPT

## 2023-12-31 PROCEDURE — 6360000002 HC RX W HCPCS: Performed by: EMERGENCY MEDICINE

## 2023-12-31 PROCEDURE — 96375 TX/PRO/DX INJ NEW DRUG ADDON: CPT

## 2023-12-31 RX ORDER — SODIUM CHLORIDE 0.9 % (FLUSH) 0.9 %
5-40 SYRINGE (ML) INJECTION EVERY 12 HOURS SCHEDULED
Status: DISCONTINUED | OUTPATIENT
Start: 2023-12-31 | End: 2024-01-05 | Stop reason: HOSPADM

## 2023-12-31 RX ORDER — ONDANSETRON 4 MG/1
4 TABLET, ORALLY DISINTEGRATING ORAL EVERY 8 HOURS PRN
Status: DISCONTINUED | OUTPATIENT
Start: 2023-12-31 | End: 2024-01-05 | Stop reason: HOSPADM

## 2023-12-31 RX ORDER — HYDROXYZINE HYDROCHLORIDE 25 MG/1
50 TABLET, FILM COATED ORAL 2 TIMES DAILY
Status: DISCONTINUED | OUTPATIENT
Start: 2023-12-31 | End: 2024-01-05 | Stop reason: HOSPADM

## 2023-12-31 RX ORDER — ONDANSETRON 2 MG/ML
4 INJECTION INTRAMUSCULAR; INTRAVENOUS EVERY 6 HOURS PRN
Status: DISCONTINUED | OUTPATIENT
Start: 2023-12-31 | End: 2024-01-05 | Stop reason: HOSPADM

## 2023-12-31 RX ORDER — IOPAMIDOL 408 MG/ML
100 INJECTION, SOLUTION INTRATHECAL
Status: COMPLETED | OUTPATIENT
Start: 2023-12-31 | End: 2023-12-31

## 2023-12-31 RX ORDER — MAGNESIUM SULFATE IN WATER 40 MG/ML
2000 INJECTION, SOLUTION INTRAVENOUS PRN
Status: DISCONTINUED | OUTPATIENT
Start: 2023-12-31 | End: 2024-01-02

## 2023-12-31 RX ORDER — ACETAMINOPHEN 325 MG/1
650 TABLET ORAL EVERY 6 HOURS PRN
Status: DISCONTINUED | OUTPATIENT
Start: 2023-12-31 | End: 2024-01-05 | Stop reason: HOSPADM

## 2023-12-31 RX ORDER — POLYETHYLENE GLYCOL 3350 17 G/17G
17 POWDER, FOR SOLUTION ORAL DAILY PRN
Status: DISCONTINUED | OUTPATIENT
Start: 2023-12-31 | End: 2024-01-05 | Stop reason: HOSPADM

## 2023-12-31 RX ORDER — SODIUM CHLORIDE 0.9 % (FLUSH) 0.9 %
5-40 SYRINGE (ML) INJECTION PRN
Status: DISCONTINUED | OUTPATIENT
Start: 2023-12-31 | End: 2024-01-05 | Stop reason: HOSPADM

## 2023-12-31 RX ORDER — LEVOTHYROXINE SODIUM 0.12 MG/1
125 TABLET ORAL DAILY
Status: DISCONTINUED | OUTPATIENT
Start: 2024-01-01 | End: 2024-01-05 | Stop reason: HOSPADM

## 2023-12-31 RX ORDER — SODIUM CHLORIDE, SODIUM LACTATE, POTASSIUM CHLORIDE, CALCIUM CHLORIDE 600; 310; 30; 20 MG/100ML; MG/100ML; MG/100ML; MG/100ML
INJECTION, SOLUTION INTRAVENOUS CONTINUOUS
Status: DISCONTINUED | OUTPATIENT
Start: 2023-12-31 | End: 2024-01-05 | Stop reason: HOSPADM

## 2023-12-31 RX ORDER — CARVEDILOL 6.25 MG/1
6.25 TABLET ORAL 2 TIMES DAILY WITH MEALS
Status: DISCONTINUED | OUTPATIENT
Start: 2023-12-31 | End: 2024-01-05 | Stop reason: HOSPADM

## 2023-12-31 RX ORDER — MORPHINE SULFATE 4 MG/ML
2 INJECTION, SOLUTION INTRAMUSCULAR; INTRAVENOUS
Status: COMPLETED | OUTPATIENT
Start: 2023-12-31 | End: 2023-12-31

## 2023-12-31 RX ORDER — POTASSIUM CHLORIDE 7.45 MG/ML
10 INJECTION INTRAVENOUS PRN
Status: DISCONTINUED | OUTPATIENT
Start: 2023-12-31 | End: 2024-01-02

## 2023-12-31 RX ORDER — 0.9 % SODIUM CHLORIDE 0.9 %
1000 INTRAVENOUS SOLUTION INTRAVENOUS ONCE
Status: COMPLETED | OUTPATIENT
Start: 2023-12-31 | End: 2023-12-31

## 2023-12-31 RX ORDER — SODIUM CHLORIDE 9 MG/ML
INJECTION, SOLUTION INTRAVENOUS PRN
Status: DISCONTINUED | OUTPATIENT
Start: 2023-12-31 | End: 2024-01-05 | Stop reason: HOSPADM

## 2023-12-31 RX ORDER — ACETAMINOPHEN 650 MG/1
650 SUPPOSITORY RECTAL EVERY 6 HOURS PRN
Status: DISCONTINUED | OUTPATIENT
Start: 2023-12-31 | End: 2024-01-05 | Stop reason: HOSPADM

## 2023-12-31 RX ORDER — HEPARIN SODIUM 5000 [USP'U]/ML
5000 INJECTION, SOLUTION INTRAVENOUS; SUBCUTANEOUS 2 TIMES DAILY
Status: CANCELLED | OUTPATIENT
Start: 2023-12-31

## 2023-12-31 RX ORDER — ONDANSETRON 2 MG/ML
4 INJECTION INTRAMUSCULAR; INTRAVENOUS ONCE
Status: COMPLETED | OUTPATIENT
Start: 2023-12-31 | End: 2023-12-31

## 2023-12-31 RX ORDER — OXYBUTYNIN CHLORIDE 10 MG/1
10 TABLET, EXTENDED RELEASE ORAL DAILY
Status: DISCONTINUED | OUTPATIENT
Start: 2023-12-31 | End: 2024-01-05 | Stop reason: HOSPADM

## 2023-12-31 RX ADMIN — SODIUM CHLORIDE, PRESERVATIVE FREE 20 MG: 5 INJECTION INTRAVENOUS at 21:09

## 2023-12-31 RX ADMIN — SODIUM CHLORIDE, PRESERVATIVE FREE 10 ML: 5 INJECTION INTRAVENOUS at 21:10

## 2023-12-31 RX ADMIN — MORPHINE SULFATE 2 MG: 4 INJECTION INTRAVENOUS at 11:46

## 2023-12-31 RX ADMIN — PIPERACILLIN AND TAZOBACTAM 3375 MG: 3; .375 INJECTION, POWDER, FOR SOLUTION INTRAVENOUS at 23:44

## 2023-12-31 RX ADMIN — PIPERACILLIN AND TAZOBACTAM 4500 MG: 4; .5 INJECTION, POWDER, FOR SOLUTION INTRAVENOUS at 17:21

## 2023-12-31 RX ADMIN — SODIUM CHLORIDE 1000 ML: 9 INJECTION, SOLUTION INTRAVENOUS at 11:46

## 2023-12-31 RX ADMIN — ONDANSETRON 4 MG: 2 INJECTION INTRAMUSCULAR; INTRAVENOUS at 11:46

## 2023-12-31 RX ADMIN — SODIUM CHLORIDE, PRESERVATIVE FREE 1 MG: 5 INJECTION INTRAVENOUS at 23:42

## 2023-12-31 RX ADMIN — SODIUM CHLORIDE, POTASSIUM CHLORIDE, SODIUM LACTATE AND CALCIUM CHLORIDE: 600; 310; 30; 20 INJECTION, SOLUTION INTRAVENOUS at 19:57

## 2023-12-31 RX ADMIN — IOPAMIDOL 100 ML: 408 INJECTION, SOLUTION INTRATHECAL at 12:23

## 2023-12-31 ASSESSMENT — PAIN SCALES - GENERAL
PAINLEVEL_OUTOF10: 0
PAINLEVEL_OUTOF10: 3

## 2023-12-31 ASSESSMENT — PAIN - FUNCTIONAL ASSESSMENT
PAIN_FUNCTIONAL_ASSESSMENT: NONE - DENIES PAIN
PAIN_FUNCTIONAL_ASSESSMENT: ACTIVITIES ARE NOT PREVENTED
PAIN_FUNCTIONAL_ASSESSMENT: 0-10

## 2023-12-31 ASSESSMENT — PAIN DESCRIPTION - ONSET: ONSET: ON-GOING

## 2023-12-31 ASSESSMENT — PAIN DESCRIPTION - LOCATION: LOCATION: ABDOMEN

## 2023-12-31 ASSESSMENT — PAIN DESCRIPTION - FREQUENCY: FREQUENCY: CONTINUOUS

## 2023-12-31 ASSESSMENT — PAIN DESCRIPTION - PAIN TYPE: TYPE: ACUTE PAIN

## 2023-12-31 ASSESSMENT — PAIN DESCRIPTION - ORIENTATION: ORIENTATION: LOWER

## 2023-12-31 ASSESSMENT — PAIN DESCRIPTION - DESCRIPTORS: DESCRIPTORS: ACHING;CRAMPING

## 2023-12-31 NOTE — ED PROVIDER NOTES
[employer] Emergency Department Provider Note                   PCP:                Bucky Armenta DO               Age: 78 y.o.      Sex: male     MEDICAL DECISION MAKING  Complexity of Problems Addressed:   1 or more acute illness/injury that poses a threat to life or bodily function    Data Reviewed and Analyzed:  Category 1:    I have reviewed outside records from an external source for any pertinent PMH, ED visits, primary care visits, specialist visits, labs, EKG, and/or radiologic studies.    Category 2:         I independently ordered and reviewed the labs.    I have reviewed the Radiologist interpretation of the radiologic studies.        The patient was admitted and I have discussed patient management with the admitting provider.    Category 3:     Discussion of management or test interpretation:    MDM  Number of Diagnoses or Management Options  Dehydration  Nausea and vomiting, unspecified vomiting type  Small bowel obstruction due to adhesions ([facility])  Urinary tract infection associated with indwelling urethral catheter, initial encounter ([facility])  Diagnosis management comments: Patient presented for nausea vomiting and abdominal pain.  Patient is clinically dehydrated with dry mucous membranes.  His heart rate and blood pressure are normal.  His BUN/creatinine ratio is elevated consistent with dehydration.  His electrolytes are stable.  His lactate is normal and no indication of sepsis.  Patient's abdomen pelvic CT shows small bowel obstruction due to adhesions with transition point.  There is also an abnormal fluid collection posterior to the urinary bladder which could be fistula or contained perforation.  Patient does have John catheter in place.  His urinalysis does have findings of infection so he was started on broad-spectrum antibiotic.  NG tube ordered.  General surgery and urology team were consulted.  Patient was admitted to the hospital service          Peter Nolasco is a 78 y.o. male who  presents to the Emergency Department with chief complaint of    Chief Complaint   Patient presents with    Emesis      Patient was brought in by EMS from nursing facility for abdominal pain, nausea, and vomiting that started yesterday.  Patient states that he has some diffuse abdominal cramping.  He has an ostomy and states that he has been having normal stool output.  He does have a John catheter in place and it is draining dark yellow urine.    The history is provided by the patient and the EMS personnel.       All other systems reviewed and are negative.    Review of Systems   Constitutional:  Positive for appetite change and fatigue. Negative for chills and fever.   Respiratory:  Negative for shortness of breath.    Cardiovascular:  Negative for chest pain.   Gastrointestinal:  Positive for abdominal pain, nausea and vomiting. Negative for constipation and diarrhea.   Genitourinary:  Negative for decreased urine volume, flank pain and hematuria.   Musculoskeletal:  Negative for back pain.   Neurological:  Negative for light-headedness.       Past Medical History:   Diagnosis Date    Acute deep vein thrombosis (DVT) of non-extremity vein 5/20/2019    Anemia     Cancer (HCC)     Family history of malignant neoplasm of gastrointestinal tract     mother colon/ovarian cancer 72    Fecal incontinence     LAR syndrome    John catheter in place 2022    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    surgery and radiation     11 mmol/L    Glucose 109 (H) 65 - 100 mg/dL    BUN 57 (H) 8 - 23 MG/DL    Creatinine 1.50 0.8 - 1.5 MG/DL    Est, Glom Filt Rate 47 (L) >60 ml/min/1.73m2    Calcium 9.0 8.3 - 10.4 MG/DL    Total Bilirubin 0.3 0.2 - 1.1 MG/DL    ALT 41 12 - 65 U/L    AST 22 15 - 37 U/L    Alk Phosphatase 105 50 - 136 U/L    Total Protein 7.8 6.3 - 8.2 g/dL    Albumin 2.9 (L) 3.2 - 4.6 g/dL    Globulin 4.9 (H) 2.8 - 4.5 g/dL    Albumin/Globulin Ratio 0.6 0.4 - 1.6     CBC with Auto Differential    Collection Time: 12/31/23 11:12 AM   Result Value Ref Range    WBC 9.3 4.3 - 11.1 K/uL    RBC 4.39 4.23 - 5.6 M/uL    Hemoglobin 12.4 (L) 13.6 - 17.2 g/dL    Hematocrit 39.4 (L) 41.1 - 50.3 %    MCV 89.7 82 - 102 FL    MCH 28.2 26.1 - 32.9 PG    MCHC 31.5 31.4 - 35.0 g/dL    RDW 12.7 11.9 - 14.6 %    Platelets 406 150 - 450 K/uL    MPV 8.4 (L) 9.4 - 12.3 FL    nRBC 0.00 0.0 - 0.2 K/uL    Differential Type AUTOMATED      Neutrophils % 85 (H) 43 - 78 %    Lymphocytes % 8 (L) 13 - 44 %    Monocytes % 7 4.0 - 12.0 %    Eosinophils % 0 (L) 0.5 - 7.8 %    Basophils % 0 0.0 - 2.0 %    Immature Granulocytes 1 0.0 - 5.0 %    Neutrophils Absolute 7.9 1.7 - 8.2 K/UL    Lymphocytes Absolute 0.7 0.5 - 4.6 K/UL    Monocytes Absolute 0.6 0.1 - 1.3 K/UL    Eosinophils Absolute 0.0 0.0 - 0.8 K/UL    Basophils Absolute 0.0 0.0 - 0.2 K/UL    Absolute Immature Granulocyte 0.1 0.0 - 0.5 K/UL   Lipase    Collection Time: 12/31/23 11:12 AM   Result Value Ref Range    Lipase 67 (L) 73 - 393 U/L   Urinalysis with Reflex to Culture    Collection Time: 12/31/23 11:45 AM    Specimen: Urine   Result Value Ref Range    Color, UA PINK      Appearance TURBID      Specific Gravity, UA 1.025 (H) 1.001 - 1.023      pH, Urine 6.0 5.0 - 9.0      Protein,  (A) NEG mg/dL    Glucose, UA Negative mg/dL    Ketones, Urine Negative NEG mg/dL    Bilirubin Urine Negative NEG      Blood, Urine LARGE (A) NEG      Urobilinogen, Urine 0.2 0.2 - 1.0 EU/dL    Nitrite, Urine Negative NEG       Leukocyte Esterase, Urine MODERATE (A) NEG      WBC, UA >100 0 /hpf    RBC, UA 20-50 0 /hpf    BACTERIA, URINE 4+ (H) 0 /hpf    Urine Culture if Indicated URINE CULTURE ORDERED      Epithelial Cells UA 0 0 /hpf    Casts 0 0 /lpf    Crystals 0 0 /LPF    Mucus, UA 0 0 /lpf    Other observations RESULTS VERIFIED MANUALLY     Lactate, Sepsis    Collection Time: 12/31/23 11:45 AM   Result Value Ref Range    Lactic Acid, Sepsis 1.2 0.4 - 2.0 MMOL/L          CT ABDOMEN PELVIS W IV CONTRAST Additional Contrast? None   Final Result   1.  Small bowel obstruction, with transition point in the upper pelvis   presumably due to scar tissue/adhesions.  Postoperative change from previous   total proctocolectomy.   2.  Abnormal appearance of the urinary bladder, with posterior fluid extending   into the presacral space.  This raises suspicion for a chronic fistula/chronic   contained perforation between the urinary bladder and posttreatment scar tissue   in the pelvis.  The bladder is decompressed from John catheter.      3.  Bilateral lower lobe infectious/inflammatory small airways disease.            XR ABDOMEN (KUB) (SINGLE AP VIEW)    (Results Pending)                    ED Course as of 12/31/23 1535   Sun Dec 31, 2023   1412 Patient is resting comfortably in bed in no distress.  His abdominal pelvic CT shows findings of small bowel obstruction with transition point.  I explained these findings to patient.  Will order a NG tube and consult with general surgery [CW]   1471 I messaged with the general surgery team and Dr. Camacho has recommended admission to the hospital service and they will consult [CW]   1534 I contacted the urology team about fluid collection behind the bladder.  They state that there is no intervention needed and leave the John catheter in place [CW]      ED Course User Index  [CW] Talat Manzanares MD       The diagnosis and plan as well as the results of any testing and treatments today in the

## 2023-12-31 NOTE — H&P
Hospitalist History and Physical   Admit Date:  2023 11:03 AM   Name:  Peter Nolasco   Age:  78 y.o.  Sex:  male  :  1945   MRN:  [id number]   Room:  ER    Presenting/Chief Complaint: Emesis     Reason(s) for Admission: Small bowel obstruction (HCC) [K56.609]     History of Present Illness:   Peter Nolasco is a 78 y.o. male with chronic urinary retention with chronic indwelling John s/p cystoscopy with right ureteral stent exchange and John catheter exchanged on , h/o rectal cancer s/p colostomy, bipolar disorder, and hypothyroidism presented to the ER on  from [facility] postacute care with 24 hours of generalized abdominal pain and nausea and vomiting.  He states that he was doing well at rehab until  when he noticed he was having some epigastric pain.  Overnight the pain worsened and he started vomiting.  He was sent to the ER to be evaluated.  In the ER CTAP revealed small bowel obstruction so an NG tube was placed.  CT also revealed possible posterior fluid near the urinary bladder concerning for chronic fistula or contained perforation.  I saw the patient after NG tube placement and he feels improved already.  Still with mild nausea.  Abdominal pain improved.  Denies chest pain.  Denies shortness of breath.  Denies fevers.  Denies suprapubic pain.    Assessment & Plan:     Principal Problem:    Small bowel obstruction (HCC)    Abdominal pain    Nausea and vomiting  CTAP consistent with small bowel obstruction  Patient with abdominal pain and nausea/vomiting  Likely due to adhesions from previous abdominal surgeries  NG tube to LIS  Check KUB daily  Strict I's and O's  N.p.o.  Consult general surgery    Active Problems:    Urinary tract infection associated with indwelling urethral catheter (HCC)  Patient's John exchanged out on   UA consistent with UTI  Start Zosyn with history of Pseudomonas UTIs  Check urine culture      KEVAN (acute kidney injury)  BACTERIA, URINE 4+ (H) 0 /hpf    Urine Culture if Indicated URINE CULTURE ORDERED      Epithelial Cells UA 0 0 /hpf    Casts 0 0 /lpf    Crystals 0 0 /LPF    Mucus, UA 0 0 /lpf    Other observations RESULTS VERIFIED MANUALLY     Lactate, Sepsis    Collection Time: 12/31/23 11:45 AM   Result Value Ref Range    Lactic Acid, Sepsis 1.2 0.4 - 2.0 MMOL/L       I have personally reviewed imaging studies:  CT ABDOMEN PELVIS W IV CONTRAST Additional Contrast? None    Result Date: 12/31/2023  PROCEDURE: CT ABDOMEN AND PELVIS WITH CONTRAST CLINICAL HISTORY:  Abdominal pain. COMPARISON: 8/29/2023. TECHNIQUE: Axial CT images of the abdomen and pelvis were obtained with IV contrast.  Multiplanar reconstructions performed.  Dose reduction technique was used on this scan by utilizing automated exposure control, adjustment of the mA and/or kV according to the patient size. DICOM format image data available to non-affiliated external healthcare facilities or entities on a secure, media free, reciprocally searchable basis with patient authorization for at least a 12 month period after the study. FINDINGS: Marked calcified coronary artery disease in the LAD unchanged. Chronic elevation of the left hemidiaphragm, with left lower lobe partial atelectasis.  Reticulonodular infiltrate in the posterior medial aspect of the left lower lobe above this area of atelectasis, compatible with infectious/inflammatory small airways disease.  Additional reticulonodular infiltrate noted in the right lower lobe with a component of peripheral discoid atelectasis. CT ABDOMEN: The liver and spleen are unremarkable..  The gallbladder is distended with no wall thickening or acute cholecystitis.  Portal veins are patent.  No significant biliary ductal dilatation.  Minimal chronic pancreatic ductal dilatation, with no evidence of a pancreatic mass or acute pancreatitis. Normal adrenals.  Right ureteral stent with proximal pigtail in the upper pole collecting

## 2023-12-31 NOTE — ED TRIAGE NOTES
Patient arrives via EMS from [facility] (EMS unsure of why he was admitted) for n/v, abdominal pain, generalized weakness x1 day. Took AM medications today and has not vomited since.   VSS en route.   Patient A+O to baseline

## 2023-12-31 NOTE — ED NOTES
Bedside report given to Elvia RN to assume care at this time.     Elba Mccarty, RN  12/31/23 [id number]

## 2024-01-01 ENCOUNTER — APPOINTMENT (OUTPATIENT)
Dept: GENERAL RADIOLOGY | Age: 79
End: 2024-01-01
Payer: MEDICARE

## 2024-01-01 PROBLEM — B33.8 RSV INFECTION: Status: ACTIVE | Noted: 2024-01-01

## 2024-01-01 LAB
ANION GAP SERPL CALC-SCNC: 8 MMOL/L (ref 2–11)
APPEARANCE UR: ABNORMAL
BACTERIA URNS QL MICRO: ABNORMAL /HPF
BILIRUB UR QL: NEGATIVE
BUN SERPL-MCNC: 41 MG/DL (ref 8–23)
CALCIUM SERPL-MCNC: 8.3 MG/DL (ref 8.3–10.4)
CASTS URNS QL MICRO: 0 /LPF
CHLORIDE SERPL-SCNC: 112 MMOL/L (ref 103–113)
CO2 SERPL-SCNC: 19 MMOL/L (ref 21–32)
COLOR UR: ABNORMAL
CREAT SERPL-MCNC: 1.2 MG/DL (ref 0.8–1.5)
CRYSTALS URNS QL MICRO: 0 /LPF
EPI CELLS #/AREA URNS HPF: 0 /HPF
GLUCOSE SERPL-MCNC: 107 MG/DL (ref 65–100)
GLUCOSE UR STRIP.AUTO-MCNC: NEGATIVE MG/DL
HGB UR QL STRIP: ABNORMAL
KETONES UR QL STRIP.AUTO: NEGATIVE MG/DL
LEUKOCYTE ESTERASE UR QL STRIP.AUTO: ABNORMAL
MUCOUS THREADS URNS QL MICRO: 0 /LPF
NITRITE UR QL STRIP.AUTO: NEGATIVE
OTHER OBSERVATIONS: ABNORMAL
PH UR STRIP: 6 (ref 5–9)
PHOSPHATE SERPL-MCNC: 2.8 MG/DL (ref 2.3–3.7)
POTASSIUM SERPL-SCNC: 3.7 MMOL/L (ref 3.5–5.1)
PROT UR STRIP-MCNC: 100 MG/DL
RBC #/AREA URNS HPF: ABNORMAL /HPF
SODIUM SERPL-SCNC: 139 MMOL/L (ref 136–146)
SP GR UR REFRACTOMETRY: 1.02 (ref 1–1.02)
URINE CULTURE IF INDICATED: ABNORMAL
UROBILINOGEN UR QL STRIP.AUTO: 0.2 EU/DL (ref 0.2–1)
WBC URNS QL MICRO: >100 /HPF

## 2024-01-01 PROCEDURE — 97112 NEUROMUSCULAR REEDUCATION: CPT

## 2024-01-01 PROCEDURE — 97530 THERAPEUTIC ACTIVITIES: CPT

## 2024-01-01 PROCEDURE — 97162 PT EVAL MOD COMPLEX 30 MIN: CPT

## 2024-01-01 PROCEDURE — 1100000000 HC RM PRIVATE

## 2024-01-01 PROCEDURE — 2580000003 HC RX 258: Performed by: FAMILY MEDICINE

## 2024-01-01 PROCEDURE — 80048 BASIC METABOLIC PNL TOTAL CA: CPT

## 2024-01-01 PROCEDURE — 97166 OT EVAL MOD COMPLEX 45 MIN: CPT

## 2024-01-01 PROCEDURE — A4216 STERILE WATER/SALINE, 10 ML: HCPCS | Performed by: INTERNAL MEDICINE

## 2024-01-01 PROCEDURE — 2500000003 HC RX 250 WO HCPCS: Performed by: INTERNAL MEDICINE

## 2024-01-01 PROCEDURE — 2580000003 HC RX 258: Performed by: INTERNAL MEDICINE

## 2024-01-01 PROCEDURE — A4216 STERILE WATER/SALINE, 10 ML: HCPCS | Performed by: FAMILY MEDICINE

## 2024-01-01 PROCEDURE — 6370000000 HC RX 637 (ALT 250 FOR IP): Performed by: INTERNAL MEDICINE

## 2024-01-01 PROCEDURE — 6360000002 HC RX W HCPCS: Performed by: FAMILY MEDICINE

## 2024-01-01 PROCEDURE — 76937 US GUIDE VASCULAR ACCESS: CPT

## 2024-01-01 PROCEDURE — 6360000002 HC RX W HCPCS: Performed by: INTERNAL MEDICINE

## 2024-01-01 PROCEDURE — 74018 RADEX ABDOMEN 1 VIEW: CPT

## 2024-01-01 PROCEDURE — 99222 1ST HOSP IP/OBS MODERATE 55: CPT | Performed by: NURSE PRACTITIONER

## 2024-01-01 PROCEDURE — 84100 ASSAY OF PHOSPHORUS: CPT

## 2024-01-01 RX ADMIN — SODIUM CHLORIDE, PRESERVATIVE FREE 20 MG: 5 INJECTION INTRAVENOUS at 22:15

## 2024-01-01 RX ADMIN — PIPERACILLIN AND TAZOBACTAM 3375 MG: 3; .375 INJECTION, POWDER, FOR SOLUTION INTRAVENOUS at 08:35

## 2024-01-01 RX ADMIN — WATER 5 MG: 1 INJECTION INTRAMUSCULAR; INTRAVENOUS; SUBCUTANEOUS at 00:13

## 2024-01-01 RX ADMIN — SODIUM CHLORIDE, PRESERVATIVE FREE 10 ML: 5 INJECTION INTRAVENOUS at 22:15

## 2024-01-01 RX ADMIN — SODIUM CHLORIDE, POTASSIUM CHLORIDE, SODIUM LACTATE AND CALCIUM CHLORIDE: 600; 310; 30; 20 INJECTION, SOLUTION INTRAVENOUS at 04:03

## 2024-01-01 RX ADMIN — HYDROXYZINE HYDROCHLORIDE 50 MG: 25 TABLET, FILM COATED ORAL at 22:15

## 2024-01-01 RX ADMIN — SODIUM CHLORIDE, PRESERVATIVE FREE 10 ML: 5 INJECTION INTRAVENOUS at 12:10

## 2024-01-01 RX ADMIN — PIPERACILLIN AND TAZOBACTAM 3375 MG: 3; .375 INJECTION, POWDER, FOR SOLUTION INTRAVENOUS at 23:10

## 2024-01-01 RX ADMIN — SODIUM CHLORIDE, POTASSIUM CHLORIDE, SODIUM LACTATE AND CALCIUM CHLORIDE: 600; 310; 30; 20 INJECTION, SOLUTION INTRAVENOUS at 23:09

## 2024-01-01 RX ADMIN — OXYBUTYNIN CHLORIDE 10 MG: 10 TABLET, EXTENDED RELEASE ORAL at 17:00

## 2024-01-01 RX ADMIN — HYDROXYZINE HYDROCHLORIDE 50 MG: 25 TABLET, FILM COATED ORAL at 15:56

## 2024-01-01 RX ADMIN — CARVEDILOL 6.25 MG: 6.25 TABLET, FILM COATED ORAL at 15:54

## 2024-01-01 RX ADMIN — PIPERACILLIN AND TAZOBACTAM 3375 MG: 3; .375 INJECTION, POWDER, FOR SOLUTION INTRAVENOUS at 15:59

## 2024-01-01 RX ADMIN — SODIUM CHLORIDE, PRESERVATIVE FREE 1 MG: 5 INJECTION INTRAVENOUS at 23:29

## 2024-01-01 ASSESSMENT — PAIN SCALES - GENERAL: PAINLEVEL_OUTOF10: 0

## 2024-01-01 NOTE — PROGRESS NOTES
PROGRESS NOTE    I was paged that the patient was positive for RSV on respiratory viral panel.  Patient on room air and denies SOB/cough.  CTAP with bibasilar infiltrates.  Place appropriate precautions.  Monitor for now.    Kenna Rajan, DO

## 2024-01-01 NOTE — ED NOTES
Report given to MIGUEL Perez. Transfer of care at this time.        Nicki Wallace RN  01/01/24 [id number]

## 2024-01-01 NOTE — PROGRESS NOTES
12/31/23 2301 -- -- -- -- 97 %   12/31/23 2300 -- -- -- (!) 161/90 --   12/31/23 2247 -- -- -- -- 97 %   12/31/23 2245 -- -- -- (!) 151/81 --   12/31/23 2230 -- -- -- (!) 140/85 95 %   12/31/23 2218 -- -- -- -- 95 %   12/31/23 2217 -- -- -- -- 98 %   12/31/23 2213 -- -- -- (!) 140/84 --   12/31/23 2158 -- -- -- (!) 147/80 95 %   12/31/23 2101 -- -- -- 129/81 97 %   12/31/23 2046 -- -- -- 125/79 95 %   12/31/23 2030 -- -- -- 117/75 --   12/31/23 2028 -- -- -- -- 96 %   12/31/23 2017 -- -- -- -- 94 %   12/31/23 2015 -- -- -- 127/73 --   12/31/23 2000 -- -- -- 133/75 92 %   12/31/23 1945 -- -- -- 120/74 95 %   12/31/23 1931 -- -- -- -- 94 %   12/31/23 1930 -- -- -- 124/76 --   12/31/23 1929 -- -- -- 128/76 --   12/31/23 1927 -- -- -- -- 94 %   12/31/23 1800 -- 90 17 135/84 94 %   12/31/23 1745 -- -- -- (!) 147/80 94 %   12/31/23 1730 -- -- -- (!) 143/88 92 %   12/31/23 1715 -- -- -- (!) 148/83 --   12/31/23 1700 -- 82 16 (!) 144/83 97 %   12/31/23 1645 -- 74 17 (!) 141/85 95 %   12/31/23 1630 -- -- -- (!) 144/81 96 %   12/31/23 1615 -- 80 17 138/83 96 %   12/31/23 1515 -- -- -- -- 92 %   12/31/23 1330 97.7 °F (36.5 °C) 92 16 (!) 143/90 98 %   12/31/23 1200 -- -- -- 130/78 --   12/31/23 1157 -- -- -- -- 95 %   12/31/23 1106 98.4 °F (36.9 °C) 96 20 119/89 94 %       Oxygen Therapy  SpO2: 94 %  Pulse via Oximetry: 84 beats per minute  O2 Device: None (Room air)    Estimated body mass index is 15.77 kg/m² as calculated from the following:    Height as of this encounter: 1.727 m (5' 8\").    Weight as of this encounter: 47 kg (103 lb 11.2 oz).    Intake/Output Summary (Last 24 hours) at 1/1/2024 1008  Last data filed at 1/1/2024 0607  Gross per 24 hour   Intake 1014.17 ml   Output 1600 ml   Net -585.83 ml         Physical Exam:   General:    Thin, underweight, in bed, NAD. RA O2.  Head:  Normocephalic, atraumatic  Eyes:  Sclerae appear normal.  Pupils equally round.  ENT:  Nares appear normal.  Moist oral mucosa  Neck:  No  Immature Granulocytes 1 0.0 - 5.0 %    Neutrophils Absolute 7.9 1.7 - 8.2 K/UL    Lymphocytes Absolute 0.7 0.5 - 4.6 K/UL    Monocytes Absolute 0.6 0.1 - 1.3 K/UL    Eosinophils Absolute 0.0 0.0 - 0.8 K/UL    Basophils Absolute 0.0 0.0 - 0.2 K/UL    Absolute Immature Granulocyte 0.1 0.0 - 0.5 K/UL   Lipase    Collection Time: 12/31/23 11:12 AM   Result Value Ref Range    Lipase 67 (L) 73 - 393 U/L   Urinalysis with Reflex to Culture    Collection Time: 12/31/23 11:45 AM    Specimen: Urine   Result Value Ref Range    Color, UA PINK      Appearance TURBID      Specific Gravity, UA 1.025 (H) 1.001 - 1.023      pH, Urine 6.0 5.0 - 9.0      Protein,  (A) NEG mg/dL    Glucose, UA Negative mg/dL    Ketones, Urine Negative NEG mg/dL    Bilirubin Urine Negative NEG      Blood, Urine LARGE (A) NEG      Urobilinogen, Urine 0.2 0.2 - 1.0 EU/dL    Nitrite, Urine Negative NEG      Leukocyte Esterase, Urine MODERATE (A) NEG      WBC, UA >100 0 /hpf    RBC, UA 20-50 0 /hpf    BACTERIA, URINE 4+ (H) 0 /hpf    Urine Culture if Indicated URINE CULTURE ORDERED      Epithelial Cells UA 0 0 /hpf    Casts 0 0 /lpf    Crystals 0 0 /LPF    Mucus, UA 0 0 /lpf    Other observations RESULTS VERIFIED MANUALLY     Lactate, Sepsis    Collection Time: 12/31/23 11:45 AM   Result Value Ref Range    Lactic Acid, Sepsis 1.2 0.4 - 2.0 MMOL/L   Culture, Urine    Collection Time: 12/31/23 11:45 AM    Specimen: Urine   Result Value Ref Range    Special Requests NO SPECIAL REQUESTS  Reflexed from [id number]        Culture CULTURE IN PROGRESS,FURTHER UPDATES TO FOLLOW     Culture, Blood 1    Collection Time: 12/31/23  7:34 PM    Specimen: Blood   Result Value Ref Range    Special Requests LEFT  Antecubital        Culture NO GROWTH AFTER 10 HOURS     Culture, Blood 1    Collection Time: 12/31/23  7:35 PM    Specimen: Blood   Result Value Ref Range    Special Requests RIGHT  FOREARM        Culture NO GROWTH AFTER 10 HOURS     Respiratory Panel, Molecular,  with COVID-19 (Restricted: peds pts or suitable admitted adults)    Collection Time: 12/31/23  7:57 PM    Specimen: Nasopharyngeal   Result Value Ref Range    Adenovirus by PCR NOT DETECTED NOTDET      Coronavirus 229E by PCR NOT DETECTED NOTDET      Coronavirus HKU1 by PCR NOT DETECTED NOTDET      Coronavirus NL63 by PCR NOT DETECTED NOTDET      Coronavirus OC43 by PCR NOT DETECTED NOTDET      SARS-CoV-2, PCR NOT DETECTED NOTDET      Human Metapneumovirus by PCR NOT DETECTED NOTDET      Rhinovirus Enterovirus PCR NOT DETECTED NOTDET      Influenza A by PCR NOT DETECTED NOTDET      Influenza B PCR NOT DETECTED NOTDET      Parainfluenza 1 PCR NOT DETECTED NOTDET      Parainfluenza 2 PCR NOT DETECTED NOTDET      Parainfluenza 3 PCR NOT DETECTED NOTDET      Parainfluenza 4 PCR NOT DETECTED NOTDET      Respiratory Syncytial Virus by PCR Detected (A) NOTDET      Bordetella parapertussis by PCR NOT DETECTED NOTDET      Bordetella pertussis by PCR NOT DETECTED NOTDET      Chlamydophila Pneumonia PCR NOT DETECTED NOTDET      Mycoplasma pneumo by PCR NOT DETECTED NOTDET     Basic Metabolic Panel w/ Reflex to MG    Collection Time: 01/01/24  6:02 AM   Result Value Ref Range    Sodium 139 136 - 146 mmol/L    Potassium 3.7 3.5 - 5.1 mmol/L    Chloride 112 103 - 113 mmol/L    CO2 19 (L) 21 - 32 mmol/L    Anion Gap 8 2 - 11 mmol/L    Glucose 107 (H) 65 - 100 mg/dL    BUN 41 (H) 8 - 23 MG/DL    Creatinine 1.20 0.8 - 1.5 MG/DL    Est, Glom Filt Rate >60 >60 ml/min/1.73m2    Calcium 8.3 8.3 - 10.4 MG/DL   Phosphorus    Collection Time: 01/01/24  6:02 AM   Result Value Ref Range    Phosphorus 2.8 2.3 - 3.7 MG/DL       Current Meds:  Current Facility-Administered Medications   Medication Dose Route Frequency    [Held by provider] carvedilol (COREG) tablet 6.25 mg  6.25 mg Oral BID WC    [Held by provider] hydrOXYzine HCl (ATARAX) tablet 50 mg  50 mg Oral BID    [Held by provider] levothyroxine (SYNTHROID) tablet 125 mcg  125 mcg Oral

## 2024-01-01 NOTE — CONSULTS
Subjective:     Patient is a 78 y.o.  male presents with abdominal pain.  Patient has history of rectal cancer status post colostomy.  He underwent chemotherapy and radiation therapy.  He reports that they attempted to reanastomose but it was unsuccessful so he has a permanent colostomy.  Patient was brought to the ED yesterday due to acute onset of severe diffuse crampy and sharp abdominal pain.  He reports pain was so severe he required narcotics for treatment.  His pain was accompanied by nausea or vomiting.  At that time he was having no output from his stoma.  He reports that since he has been in the hospital he has had gas and stool from his stoma and his pain has resolved.    Patient Active Problem List    Diagnosis Date Noted    Leg weakness, bilateral 10/16/2022    Mild episode of recurrent major depressive disorder (Formerly [facility]) 06/21/2022    RSV infection 01/01/2024    Urinary tract infection associated with indwelling urethral catheter (Formerly [facility]) 12/31/2023    KEVAN (acute kidney injury) (Formerly [facility]) 12/31/2023    Abdominal pain 12/31/2023    Nausea and vomiting 12/31/2023    Hydronephrosis with ureteral stricture 12/21/2023    Localized swelling of right upper extremity 12/01/2023    Right shoulder pain 12/01/2023    Rhabdomyolysis 11/30/2023    Impaired functional mobility, balance, gait, and endurance 09/03/2023    Physical debility 09/01/2023    Pseudomonas urinary tract infection 09/01/2023    Encounter for rehabilitation 09/01/2023    Adult failure to thrive 08/29/2023    John catheter problem (Formerly [facility]) 08/20/2023    Difficulty in walking 07/13/2023    Magnetic resonance imaging of brain abnormal 12/13/2022    Small bowel obstruction (Formerly [facility]) 11/22/2022    Debility 10/25/2022    Primary insomnia 10/25/2022    Primary hypertension 10/25/2022    Recurrent major depressive disorder, in partial remission (Formerly [facility]) 10/25/2022    Presence of colostomy (Formerly [facility]) 10/25/2022    Weakness of both lower extremities 10/25/2022     ileostomy    TOTAL COLECTOMY Right 2014    for leak    VASCULAR SURGERY Left     single lumen port/subsequently removed      Not in a hospital admission.  No Known Allergies   Social History     Tobacco Use    Smoking status: Former     Current packs/day: 0.00     Average packs/day: 1 pack/day for 10.0 years (10.0 ttl pk-yrs)     Types: Cigarettes     Start date: 1953     Quit date: 1963     Years since quittin.1     Passive exposure: Current    Smokeless tobacco: Never   Substance Use Topics    Alcohol use: Not Currently     Alcohol/week: 11.7 standard drinks of alcohol      Family History   Problem Relation Age of Onset    Cancer Mother         colon and ovarian    Cancer Brother         prostate    Alcohol Abuse Brother     Cancer Maternal Grandmother         bone    Alcohol Abuse Father     Stroke Paternal Grandfather     Alcohol Abuse Sister       Review of Systems  Pertinent items are noted in HPI.    Objective:     Patient Vitals for the past 8 hrs:   BP Temp Pulse Resp SpO2   24 1319 (!) 173/88 -- 90 16 98 %   24 1249 (!) 142/88 -- -- -- --   24 1219 (!) 165/102 -- 92 12 96 %   24 1157 (!) 164/111 -- 95 16 93 %   24 1137 -- -- -- -- 93 %   24 1127 (!) 159/91 -- 89 16 --   24 1122 -- -- -- -- 94 %   24 1112 (!) 151/88 -- 94 16 --   24 1056 131/85 -- 91 16 98 %   24 1016 (!) 155/92 -- 90 16 94 %   24 0954 (!) 149/87 -- 84 16 94 %   24 0914 (!) 157/90 -- -- -- --   24 0904 (!) 166/102 -- 87 16 90 %   24 0854 -- -- -- -- 93 %   24 0844 (!) 162/94 -- 87 16 --   24 0825 (!) 155/87 -- 84 16 92 %   24 0815 (!) 154/88 -- 86 16 98 %   24 0745 (!) 157/89 -- 90 16 94 %   24 0733 (!) 154/93 -- 88 16 90 %   24 0600 (!) 140/82 97.9 °F (36.6 °C) 85 16 97 %     I/O last 3 completed shifts:  In: 1014.2 [I.V.:871; IV Piggyback:143.2]  Out: 1600 [Urine:1300; Stool:300]  No intake/output  retention    Chronic indwelling John catheter    Urinary tract infection associated with indwelling urethral catheter (HCC)    Difficulty in walking    Bipolar disorder, unspecified (HCC)    Primary hypertension    Acquired hypothyroidism    KEVAN (acute kidney injury) (HCC)    Abdominal pain    Nausea and vomiting    RSV infection  Resolved Problems:    * No resolved hospital problems. *    Patient had bowel obstruction which appears much improved, it is uncertain if this was a small bowel obstruction however he now has gas and stool from his stoma    Plan:     Continue nonoperative management    I have ordered his NG tube to be clamped    I put him on clear liquids    Further recommendations will be based on clinical course    Coding:  Problem points: 3 Patient has a new problem, no further workup necessary  Points for data reviewed: 3 Labs and x-rays were reviewed, and personal review was performed of radiograph  Level of risk: Moderate   Medical decision making: Moderate, Level 4

## 2024-01-01 NOTE — ED NOTES
Pt asked about changing ostomy bag.  Ostomy bag was assessed which was less than 1/4 full with no leakage or smell noted.  Pt was reassured and verbalized he understood bag did not need to be changed.       Brenda Gloria RN  01/01/24 2283

## 2024-01-01 NOTE — ED NOTES
NG was replacement and secured with nasal bridal.  KUB was put in to confirm placement.     Brenda Gloria RN  01/01/24 0030

## 2024-01-01 NOTE — ED NOTES
Pt was told floor phones were dead and one is being charged for him to use.  Pt verbalized understanding.      Brenda Gloria RN  01/01/24 1123

## 2024-01-01 NOTE — THERAPY EVALUATION
ACUTE PHYSICAL THERAPY GOALS:   (Developed with and agreed upon by patient and/or caregiver.)  (1.) Peter Nolasco  will move from supine to sit and sit to supine , scoot up and down, and roll side to side with STAND BY ASSIST within 7 treatment day(s).    (2.) Peter Nolasco will transfer from bed to chair and chair to bed with STAND BY ASSIST using the least restrictive device within 7 treatment day(s).    (3.) Peter Nolasco will ambulate with CONTACT GUARD ASSIST for 500+ feet with the least restrictive device within 7 treatment day(s).   (4.) Peter Nolasco will perform standing static and dynamic balance activities x 25 minutes with STAND BY ASSIST to improve safety within 7 treatment day(s).  (5.) Peter Nolasco will perform therapeutic exercises x 20 min for HEP with SUPERVISION to improve strength, endurance, and functional mobility within 7 treatment day(s).     PHYSICAL THERAPY Initial Assessment, Daily Note, and AM  (Link to Caseload Tracking: PT Visit Days : 1  Acknowledge Orders  Time In/Out  PT Charge Capture  Rehab Caseload Tracker    Peter Nolasco is a 78 y.o. male   PRIMARY DIAGNOSIS: Small bowel obstruction (HCC)  Small bowel obstruction (HCC) [K56.609]       Reason for Referral: Generalized Muscle Weakness (M62.81)  Other lack of cordination (R27.8)  Difficulty in walking, Not elsewhere classified (R26.2)  Other abnormalities of gait and mobility (R26.89)  Inpatient: Payor: MEDICARE / Plan: MEDICARE PART A AND B / Product Type: *No Product type* /     ASSESSMENT:     REHAB RECOMMENDATIONS:   Recommendation to date pending progress:  Setting:  Short-term Rehab    Equipment:    To Be Determined     ASSESSMENT:    Mr. Nolasco is an 78 year old M who presents to hospital from [facility] with generalized abdominal pain, nausea, vomiting. Pt now with NG tube to suction. PMH includes  chronic urinary retention with chronic indwelling John s/p cystoscopy with

## 2024-01-01 NOTE — ED NOTES
TRANSFER - OUT REPORT:    Verbal report given to Tania RIVERA on Peter Nolasco  being transferred to [facility] for routine progression of patient care       Report consisted of patient's Situation, Background, Assessment and   Recommendations(SBAR).     Information from the following report(s) ED Encounter Summary was reviewed with the receiving nurse.    Gaston Fall Assessment:    Presents to emergency department  because of falls (Syncope, seizure, or loss of consciousness): No  Age > 70: Yes  Altered Mental Status, Intoxication with alcohol or substance confusion (Disorientation, impaired judgment, poor safety awaremess, or inability to follow instructions): No  Impaired Mobility: Ambulates or transfers with assistive devices or assistance; Unable to ambulate or transer.: Yes  Nursing Judgement: Yes          Lines:   Peripheral IV 12/31/23 Right Forearm (Active)   Site Assessment Clean, dry & intact 01/01/24 1110   Line Status Blood return noted;Flushed 01/01/24 1110   Phlebitis Assessment No symptoms 01/01/24 1110   Infiltration Assessment 0 01/01/24 1110       Peripheral IV 12/31/23 Left Antecubital (Active)   Site Assessment Clean, dry & intact 01/01/24 1110   Line Status Flushed;Blood return noted 01/01/24 1110   Phlebitis Assessment No symptoms 01/01/24 1110   Infiltration Assessment 0 01/01/24 1110        Opportunity for questions and clarification was provided.      Patient transported with:  Brenda Mcknight RN  01/01/24 0974

## 2024-01-01 NOTE — ED NOTES
Pt took off blood pressure cuff and sticky oxygen probe as well as NG tube.  When pt was asked why he was off the monitor, he stated \"because I want to sleep.\"  Pt was reeducated about the importance of keeping the monitoring equipment on and was reassured he could sleep with a sticky sat probe and BP cuff taking once an hour.  Pt also denied pulling NG tube out again but is now at 20 after being secured to nose.  NG tube has had to be advanced further in 3 times now and pts hand was on tube every time upon entering the room.  Pts suction was turned off while acquiring a nasal bridal from ICU to adhere tubing to pt better.        Brenda Gloria, RN  01/01/24 3030

## 2024-01-01 NOTE — ED NOTES
Pt is very agitated and asking for his meds to help him sleep.  Pt did not seem to know it was 12:00 pm.  Pt stated his bedtime wass 10 pm and I told him he would be getting his meds at 8 or 9 to help him sleep.  Pt insisted that it was nighttime for him and he needed them now.  Pt was told again it was 12:15 pm as in lunch time.  The current time and time for meds was written on the board for reference.  Pt was told other PO meds were held due to his NG tube and it was explained that it would just get suctioned back out.  Pt insisted it was 12 am, demanded to talk to his doctor, and was stated he has been here for a week.  Doctor Audie aware.      Brenda Gloria, RN  01/01/24 1155

## 2024-01-01 NOTE — ED NOTES
Pt given hot broth and jello for dinner per clear liquid orders.     Brenda Gloria RN  01/01/24 7610

## 2024-01-01 NOTE — ED NOTES
Dr Bronson aware pt took out NG tube and of plan to secure with nasal bridal and confirm placement with KUB.     Brenda Gloria, MIGUEL  01/01/24 2587

## 2024-01-01 NOTE — PROGRESS NOTES
TRANSFER - IN REPORT:    Verbal report received from MIGUEL Webster on Peter Nolasco  being received from [facility] for routine progression of patient care      Report consisted of patient's Situation, Background, Assessment and   Recommendations(SBAR).     Information from the following report(s) Intake/Output, MAR, and Recent Results was reviewed with the receiving nurse.    Opportunity for questions and clarification was provided.      Assessment completed upon patient's arrival to unit and care assumed.

## 2024-01-01 NOTE — ED NOTES
Pt was given phone to make a call after it was charged and pt stated he did not need to make a call anymore.     Brenda Gloria RN  01/01/24 4068

## 2024-01-01 NOTE — THERAPY EVALUATION
ACUTE OCCUPATIONAL THERAPY GOALS:   (Developed with and agreed upon by patient and/or caregiver.)  1. Patient will complete lower body bathing and dressing with STAND BY ASSIST and adaptive equipment as needed.     2. Patient will complete toileting with STAND BY ASSIST.  3. Patient will complete grooming ADL in standing with STAND BY ASSIST.  4. Patient will tolerate 25 minutes of OT treatment with 1-2 rest breaks to increase activity tolerance for ADLs.   5. Patient will complete functional transfers with STAND BY ASSIST and adaptive equipment as needed.   6. Patient will tolerate 10 minutes BUE exercises to increase strength for safe, functional transfers.     Timeframe: 7 visits       OCCUPATIONAL THERAPY Initial Assessment, Daily Note, and AM       OT Visit Days: 1  Acknowledge Orders  Time  OT Charge Capture  Rehab Caseload Tracker      Peter Nolasco is a 78 y.o. male   PRIMARY DIAGNOSIS: Small bowel obstruction (HCC)  Small bowel obstruction (HCC) [K56.609]       Reason for Referral: Generalized Muscle Weakness (M62.81)  Other lack of cordination (R27.8)  Difficulty in walking, Not elsewhere classified (R26.2)  Dizziness and Giddiness (R42)  Unspecified Lack of Coordination (R27.9)  Inpatient: Payor: MEDICARE / Plan: MEDICARE PART A AND B / Product Type: *No Product type* /     ASSESSMENT:     REHAB RECOMMENDATIONS:   Recommendation to date pending progress:  Setting:  Short-term Rehab    Equipment:    To Be Determined     ASSESSMENT:  Mr. Nolasco is a 79 y/o male presents from rehab with small bowel obstruction now has NG suction. Pt also has ostomy and chronic indwelling carr catheter.  Today, pt was evaluated in the ED. He demonstrates impairments in coordination, strength, balance and activity tolerance limiting ADLs and functional mobility. Pt overall Min A for bed mobility and STS, requires Mod A x 2 for side steps at EOB secondary to decreased coordination and balance with notable posterior  []  Grossly decreased, especially with coordinating side steps      Tone []       Edema []    Activity Tolerance [] Patient limited by fatigue     Hand Dominance R [] L []      COGNITION/  PERCEPTION: INTACT IMPAIRED   (See Comments)   Orientation []  Oriented to person, place, time (day but not time of day- thought it was night), situation   Vision [x]     Hearing [x]     Cognition  []  Slowed processing, decreased insight, intermittent confusion, impulsive   Perception []       MOBILITY: I Mod I S SBA CGA Min Mod Max Total  NT x2 Comments:   Bed Mobility    Rolling [] [] [] [] [] [x] [] [] [] [] []    Supine to Sit [] [] [] [] [] [x] [] [] [] [] [x]    Scooting [] [] [] [] [] [x] [] [] [] [] []    Sit to Supine [] [] [] [] [] [x] [] [] [] [] [x]    Transfers    Sit to Stand [] [] [] [] [] [] [x] [] [] [] [x]    Bed to Chair [] [] [] [] [] [] [x] [] [] [] [x]    Stand to Sit [] [] [] [] [] [] [x] [] [] [] [x]    Tub/Shower [] [] [] [] [] [] [] [] [] [] []     Toilet [] [] [] [] [] [] [] [] [] [] []      [] [] [] [] [] [] [] [] [] [] []    I=Independent, Mod I=Modified Independent, S=Supervision/Setup, SBA=Standby Assistance, CGA=Contact Guard Assistance, Min=Minimal Assistance, Mod=Moderate Assistance, Max=Maximal Assistance, Total=Total Assistance, NT=Not Tested    ACTIVITIES OF DAILY LIVING: I Mod I S SBA CGA Min Mod Max Total NT Comments   BASIC ADLs:              Upper Body Bathing  [] [] [] [] [] [] [] [] [] [x]    Lower Body Bathing [] [] [] [] [] [] [] [] [] [x]    Toileting [] [] [] [] [] [] [] [] [] [x]    Upper Body Dressing [] [] [] [] [] [] [] [] [] [x]    Lower Body Dressing [] [] [] [] [] [x] [] [] [] [] Socks EOB, assist to maintain sitting balance   Feeding [] [] [] [] [] [] [] [] [] [x]    Grooming [] [] [] [] [] [] [] [] [] [x]    Personal Device Care [] [] [] [] [] [] [] [] [] [x]    Functional Mobility [] [] [] [] [] [] [x] [] [] [] X 2, very short distances- side steps EOB and marching in place  with RW   I=Independent, Mod I=Modified Independent, S=Supervision/Setup, SBA=Standby Assistance, CGA=Contact Guard Assistance, Min=Minimal Assistance, Mod=Moderate Assistance, Max=Maximal Assistance, Total=Total Assistance, NT=Not Tested    PLAN:   FREQUENCY/DURATION   OT Plan of Care: 3 times/week for duration of hospital stay or until stated goals are met, whichever comes first.    PROBLEM LIST:   (Skilled intervention is medically necessary to address:)  Decreased ADL/Functional Activities  Decreased Activity Tolerance  Decreased AROM/PROM  Decreased Balance  Decreased Cognition  Decreased Coordination  Decreased Gait Ability  Decreased Safety Awareness  Decreased Strength  Decreased Transfer Abilities  Increased Pain   INTERVENTIONS PLANNED:  (Benefits and precautions of occupational therapy have been discussed with the patient.)  Self Care Training  Therapeutic Activity  Therapeutic Exercise/HEP  Neuromuscular Re-education  Manual Therapy  Education         TREATMENT:     EVALUATION: MODERATE COMPLEXITY: (Untimed Charge)    TREATMENT:   Neuromuscular Re-education (8 Minutes): Patient participated in neuromuscular re-education including functional reaching, weight shifting, postural training, visual scanning activity, midline training, standing tolerance activity , and sitting balance activity   with minimal and moderate assistance, verbal cues, tactile cues, visual cues, and education to improve sitting balance, standing balance, posture, coordination, static balance, and dynamic balance in order to prepare for functional task, prepare for functional transfer, and increase safety awareness.     TREATMENT GRID:  N/A    AFTER TREATMENT PRECAUTIONS: Alarm Activated, Bed, Bed/Chair Locked, Call light within reach, Needs within reach, and RN notified    INTERDISCIPLINARY COLLABORATION:  RN/ PCT, PT/ PTA, and OT/ FANRSWORTH    EDUCATION:  Education Given To: Patient  Education Provided: Plan of Care;Role of  Therapy;Transfer Training;Energy Conservation  Education Method: Verbal;Demonstration  Barriers to Learning: Cognition  Education Outcome: Verbalized understanding;Continued education needed    TOTAL TREATMENT DURATION AND TIME:  Time In: 1019  Time Out: 1035  Minutes: 16    Ciara Alba OT

## 2024-01-01 NOTE — CONSULTS
Urology Consult    Requesting MD:     Patient: Peter Nolasco MRN: [id number]  SSN: [id number]    YOB: 1945  Age: 78 y.o.  Sex: male      Subjective:      Peter Nolasco is a 78 y.o. male who  with chronic urinary retention with chronic indwelling John s/p cystoscopy with right ureteral stent exchange and John catheter exchanged on 12/21, h/o rectal cancer s/p colostomy, bipolar disorder, and hypothyroidism presented to the ER on 12/31 from [facility] postacute care with 24 hours of generalized abdominal pain and nausea and vomiting.  He states that he was doing well at rehab until 12/30 when he noticed he was having some epigastric pain.  Overnight the pain worsened and he started vomiting.  He was sent to the ER to be evaluated.  In the ER CTAP revealed small bowel obstruction so an NG tube was placed.  CT also revealed possible posterior fluid near the urinary bladder concerning for chronic fistula or contained perforation.  I saw the patient after NG tube placement and he feels improved already.  Still with mild nausea.  Abdominal pain improved.  Denies chest pain.  Denies shortness of breath.  Denies fevers.  Denies suprapubic pain.     Urology consulted for possible bladder fistula or perforation.     Patient seen at bedside. Pt. Is known to our practice followed by Dr. Alexander for surveillance cystoscopy and right ureteral stent exchanges.  Recent cystoscopy and stent exchange 12/21/23.    Past Medical History:   Diagnosis Date    Acute deep vein thrombosis (DVT) of non-extremity vein 5/20/2019    Anemia     Cancer (HCC)     Family history of malignant neoplasm of gastrointestinal tract     mother colon/ovarian cancer 72    Fecal incontinence     LAR syndrome    John catheter in place 2022    patient stated- \"catheter due to them nicking his bladder and it won't heal\"    Former cigarette smoker     History of rectal cancer     Hypothyroid     stable w/med    Infection and  regular rhythm, normal S1, S2, no murmurs, rubs, clicks or gallops.  ABDOMEN: not done  Neurological exam reveals alert, oriented, normal speech, no focal findings or movement disorder noted.  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 Cr. 1.2. HGB 12.4. WBC 9.3.    CT ABDOMEN PELVIS W IV CONTRAST     IMPRESSION:  1.  Small bowel obstruction, with transition point in the upper pelvis  presumably due to scar tissue/adhesions.  Postoperative change from previous  total proctocolectomy.  2.  Abnormal appearance of the urinary bladder, with posterior fluid extending  into the presacral space.  This raises suspicion for a chronic fistula/chronic  contained perforation between the urinary bladder and posttreatment scar tissue  in the pelvis.  The bladder is decompressed from Carr catheter.     3.  Bilateral lower lobe infectious/inflammatory small airways disease.    Plan:   Medical management per primary team.  No urologic intervention needed at this time.  Consider CT Cystogram in near future.  Will let Dr. Alexander patient is here.  Will follow.    Signed By: SHANTI Noonan     January 1, 2024           I have reviewed his CT, and I have reviewed the note and agree with the outlined plan.  Continue carr; urine culture; fu with Dr. Alexander.  Pt  had recent cysto with right JJ stent exchange; there was no evidence of fistula at that time. Call with any additional questions.        Ernie Busch MD  Urologic Oncology  [facility]

## 2024-01-02 ENCOUNTER — CARE COORDINATION (OUTPATIENT)
Dept: CARE COORDINATION | Facility: CLINIC | Age: 79
End: 2024-01-02

## 2024-01-02 LAB
ANION GAP SERPL CALC-SCNC: 7 MMOL/L (ref 2–11)
BACTERIA SPEC CULT: NORMAL
BACTERIA SPEC CULT: NORMAL
BUN SERPL-MCNC: 32 MG/DL (ref 8–23)
CALCIUM SERPL-MCNC: 8.5 MG/DL (ref 8.3–10.4)
CHLORIDE SERPL-SCNC: 108 MMOL/L (ref 103–113)
CO2 SERPL-SCNC: 24 MMOL/L (ref 21–32)
CREAT SERPL-MCNC: 1.2 MG/DL (ref 0.8–1.5)
ERYTHROCYTE [DISTWIDTH] IN BLOOD BY AUTOMATED COUNT: 12.3 % (ref 11.9–14.6)
GLUCOSE SERPL-MCNC: 72 MG/DL (ref 65–100)
HCT VFR BLD AUTO: 30.4 % (ref 41.1–50.3)
HGB BLD-MCNC: 10 G/DL (ref 13.6–17.2)
MAGNESIUM SERPL-MCNC: 1.6 MG/DL (ref 1.8–2.4)
MCH RBC QN AUTO: 28.7 PG (ref 26.1–32.9)
MCHC RBC AUTO-ENTMCNC: 32.9 G/DL (ref 31.4–35)
MCV RBC AUTO: 87.1 FL (ref 82–102)
NRBC # BLD: 0 K/UL (ref 0–0.2)
PLATELET # BLD AUTO: 291 K/UL (ref 150–450)
PMV BLD AUTO: 8.6 FL (ref 9.4–12.3)
POTASSIUM SERPL-SCNC: 3.3 MMOL/L (ref 3.5–5.1)
RBC # BLD AUTO: 3.49 M/UL (ref 4.23–5.6)
SERVICE CMNT-IMP: NORMAL
SODIUM SERPL-SCNC: 139 MMOL/L (ref 136–146)
WBC # BLD AUTO: 7.9 K/UL (ref 4.3–11.1)

## 2024-01-02 PROCEDURE — 2500000003 HC RX 250 WO HCPCS: Performed by: INTERNAL MEDICINE

## 2024-01-02 PROCEDURE — 80048 BASIC METABOLIC PNL TOTAL CA: CPT

## 2024-01-02 PROCEDURE — 2580000003 HC RX 258: Performed by: SURGERY

## 2024-01-02 PROCEDURE — 36415 COLL VENOUS BLD VENIPUNCTURE: CPT

## 2024-01-02 PROCEDURE — 2580000003 HC RX 258: Performed by: INTERNAL MEDICINE

## 2024-01-02 PROCEDURE — 6360000002 HC RX W HCPCS: Performed by: STUDENT IN AN ORGANIZED HEALTH CARE EDUCATION/TRAINING PROGRAM

## 2024-01-02 PROCEDURE — 99232 SBSQ HOSP IP/OBS MODERATE 35: CPT | Performed by: SURGERY

## 2024-01-02 PROCEDURE — A4216 STERILE WATER/SALINE, 10 ML: HCPCS | Performed by: INTERNAL MEDICINE

## 2024-01-02 PROCEDURE — 83735 ASSAY OF MAGNESIUM: CPT

## 2024-01-02 PROCEDURE — 6360000002 HC RX W HCPCS: Performed by: SURGERY

## 2024-01-02 PROCEDURE — 1100000000 HC RM PRIVATE

## 2024-01-02 PROCEDURE — 6370000000 HC RX 637 (ALT 250 FOR IP): Performed by: STUDENT IN AN ORGANIZED HEALTH CARE EDUCATION/TRAINING PROGRAM

## 2024-01-02 PROCEDURE — 6360000002 HC RX W HCPCS: Performed by: INTERNAL MEDICINE

## 2024-01-02 PROCEDURE — 85027 COMPLETE CBC AUTOMATED: CPT

## 2024-01-02 PROCEDURE — 6370000000 HC RX 637 (ALT 250 FOR IP): Performed by: INTERNAL MEDICINE

## 2024-01-02 RX ORDER — MAGNESIUM SULFATE IN WATER 40 MG/ML
2000 INJECTION, SOLUTION INTRAVENOUS ONCE
Status: COMPLETED | OUTPATIENT
Start: 2024-01-02 | End: 2024-01-02

## 2024-01-02 RX ORDER — POTASSIUM CHLORIDE 20 MEQ/1
40 TABLET, EXTENDED RELEASE ORAL ONCE
Status: COMPLETED | OUTPATIENT
Start: 2024-01-02 | End: 2024-01-02

## 2024-01-02 RX ORDER — HYDRALAZINE HYDROCHLORIDE 20 MG/ML
5 INJECTION INTRAMUSCULAR; INTRAVENOUS EVERY 6 HOURS PRN
Status: DISCONTINUED | OUTPATIENT
Start: 2024-01-02 | End: 2024-01-05 | Stop reason: HOSPADM

## 2024-01-02 RX ADMIN — OXYBUTYNIN CHLORIDE 10 MG: 10 TABLET, EXTENDED RELEASE ORAL at 08:28

## 2024-01-02 RX ADMIN — HYDROXYZINE HYDROCHLORIDE 50 MG: 25 TABLET, FILM COATED ORAL at 08:28

## 2024-01-02 RX ADMIN — MAGNESIUM SULFATE HEPTAHYDRATE 2000 MG: 40 INJECTION, SOLUTION INTRAVENOUS at 09:50

## 2024-01-02 RX ADMIN — CARVEDILOL 6.25 MG: 6.25 TABLET, FILM COATED ORAL at 08:28

## 2024-01-02 RX ADMIN — SODIUM CHLORIDE, POTASSIUM CHLORIDE, SODIUM LACTATE AND CALCIUM CHLORIDE: 600; 310; 30; 20 INJECTION, SOLUTION INTRAVENOUS at 18:31

## 2024-01-02 RX ADMIN — SODIUM CHLORIDE, PRESERVATIVE FREE 20 MG: 5 INJECTION INTRAVENOUS at 21:11

## 2024-01-02 RX ADMIN — POTASSIUM CHLORIDE 40 MEQ: 1500 TABLET, EXTENDED RELEASE ORAL at 09:49

## 2024-01-02 RX ADMIN — SODIUM CHLORIDE, PRESERVATIVE FREE 10 ML: 5 INJECTION INTRAVENOUS at 08:31

## 2024-01-02 RX ADMIN — PIPERACILLIN AND TAZOBACTAM 3375 MG: 3; .375 INJECTION, POWDER, FOR SOLUTION INTRAVENOUS at 08:33

## 2024-01-02 RX ADMIN — WATER 5 MG: 1 INJECTION INTRAMUSCULAR; INTRAVENOUS; SUBCUTANEOUS at 21:12

## 2024-01-02 RX ADMIN — SODIUM CHLORIDE, PRESERVATIVE FREE 10 ML: 5 INJECTION INTRAVENOUS at 21:10

## 2024-01-02 RX ADMIN — SODIUM CHLORIDE, POTASSIUM CHLORIDE, SODIUM LACTATE AND CALCIUM CHLORIDE: 600; 310; 30; 20 INJECTION, SOLUTION INTRAVENOUS at 08:29

## 2024-01-02 RX ADMIN — LEVOTHYROXINE SODIUM 125 MCG: 0.12 TABLET ORAL at 05:13

## 2024-01-02 RX ADMIN — PIPERACILLIN AND TAZOBACTAM 3375 MG: 3; .375 INJECTION, POWDER, FOR SOLUTION INTRAVENOUS at 16:22

## 2024-01-02 RX ADMIN — CARVEDILOL 6.25 MG: 6.25 TABLET, FILM COATED ORAL at 16:22

## 2024-01-02 RX ADMIN — HYDROXYZINE HYDROCHLORIDE 50 MG: 25 TABLET, FILM COATED ORAL at 21:10

## 2024-01-02 ASSESSMENT — PAIN SCALES - GENERAL: PAINLEVEL_OUTOF10: 0

## 2024-01-02 NOTE — PROGRESS NOTES
Admit Date: 2023    POD * No surgery found *    Procedure:  * No surgery found *    Subjective:     Patient awake in bed. Clamped NG tube pulled out by pt overnight. Pt has been tolerating Clear Liquids. No nausea or vomiting. AF, NAD. K+3.3, Mag 1.6.  Objective:       Vitals:    24 2343 24 0424 24 0425 24 0810   BP: (!) 126/91 (!) 165/88 (!) 149/80 (!) 152/79   Pulse: 91 88 86 100   Resp: 17 17  16   Temp: 97.9 °F (36.6 °C) 97.7 °F (36.5 °C)  97.9 °F (36.6 °C)   TempSrc: Oral Oral  Oral   SpO2: 94% 95% 95% 96%   Weight:       Height:           Temp (24hrs), Av.8 °F (36.6 °C), Min:97.2 °F (36.2 °C), Max:98.3 °F (36.8 °C)  .  I&O reviewed as documented.    [unfilled]     Physical Exam:   Constitutional: Thin, Alert, cooperative, no acute distress; appears stated age   Eyes: Sclera are clear. EOMs intact  ENMT: no external lesions' gross hearing normal; no obvious neck masses, no ear or lip lesions, nares normal  CV: RRR. Normal perfusion  Resp: No JVD.  Breathing is  non-labored; no audible wheezing.    GI: soft and non-distended, non-tender, Colostomy bag in place with small amount of liquid light brown stool in place.      : John patent  Musculoskeletal:  No embolic signs or cyanosis.   Neuro:  moves all 4; no focal deficits  Psychiatric: normal affect and mood    Labs:   Recent Results (from the past 24 hour(s))   Basic Metabolic Panel w/ Reflex to MG    Collection Time: 24  5:53 AM   Result Value Ref Range    Sodium 139 136 - 146 mmol/L    Potassium 3.3 (L) 3.5 - 5.1 mmol/L    Chloride 108 103 - 113 mmol/L    CO2 24 21 - 32 mmol/L    Anion Gap 7 2 - 11 mmol/L    Glucose 72 65 - 100 mg/dL    BUN 32 (H) 8 - 23 MG/DL    Creatinine 1.20 0.8 - 1.5 MG/DL    Est, Glom Filt Rate >60 >60 ml/min/1.73m2    Calcium 8.5 8.3 - 10.4 MG/DL   CBC    Collection Time: 24  5:53 AM   Result Value Ref Range    WBC 7.9 4.3 - 11.1 K/uL    RBC 3.49 (L) 4.23 - 5.6 M/uL     Hemoglobin 10.0 (L) 13.6 - 17.2 g/dL    Hematocrit 30.4 (L) 41.1 - 50.3 %    MCV 87.1 82 - 102 FL    MCH 28.7 26.1 - 32.9 PG    MCHC 32.9 31.4 - 35.0 g/dL    RDW 12.3 11.9 - 14.6 %    Platelets 291 150 - 450 K/uL    MPV 8.6 (L) 9.4 - 12.3 FL    nRBC 0.00 0.0 - 0.2 K/uL   Magnesium    Collection Time: 01/02/24  5:53 AM   Result Value Ref Range    Magnesium 1.6 (L) 1.8 - 2.4 mg/dL       All Data Reviewed    XR Results (most recent):  @Weesh([id number]:1)@    Results (most recent):  @Weesh([email or web address]:1)@   Assessment:     Principal Problem:    Small bowel obstruction (HCC)  Active Problems:    Mild episode of recurrent major depressive disorder (HCC)    Bladder injury with open wound into cavity    Severe protein-calorie malnutrition (HCC)    Urinary retention    Chronic indwelling John catheter    Urinary tract infection associated with indwelling urethral catheter (HCC)    Difficulty in walking    Bipolar disorder, unspecified (HCC)    Primary hypertension    Acquired hypothyroidism    KEVAN (acute kidney injury) (HCC)    Abdominal pain    Nausea and vomiting    RSV infection  Resolved Problems:    * No resolved hospital problems. *        Plan/Recommendations/Medical Decision Making:   IV Abx - Zosyn (UTI)  IVF LR  Pain/ nausea control,  Contact Isolation  Advance to Full Liquids  Follow labs  Replace Lytes prn  OOB/ ambulate    Kimberly A Frommel, [facility]-BC    I have seen and examined the patient with the Nurse Practitioner and agree with the above assessment and plan.     Stool in bag, abs soft, he looks cachetic. Tolerating clears  SBO resolving?  Try foods.     Reyna Hodge MD

## 2024-01-02 NOTE — CONSULTS
Comprehensive Nutrition Assessment  This assessment was completed remotely from within the hospital.   Unsuccessful at contacting pt via room or mobile phone.    Type and Reason for Visit: Initial, Consult  Poor Intake/Appetite 5 or More Days (Hospitalists)    Nutrition Recommendations/Plan:   Meals and Snacks:  Diet: Continue current diet and advance as medically appropriate  Nutrition Supplement Therapy:  Medical food supplement therapy:  Initiate Ensure Enlive three times per day (this provides 350 kcal and 20 grams protein per bottle)       Malnutrition Assessment:  Malnutrition Status: Insufficient data (No diet hx or NFPE. Unable to vlaidate weight chnages, Prior NFPE 12/2023 c/w severe fat and muscle loss.)  NFPE: deferred d/t respiratory isolation       Nutrition Assessment:  Nutrition History: Pt with hx of TPN 2019 following bowel surgeries.  Nutrition hx per RD notes  11/2022: \"Pt reports variable intake/appetite PTA- typically consumes 2 meals/day + snacks. Pt reports lowest weight of ~115lb prior to recent STR admission however states he gained 8-10lb during STR and now weighs ~123-125lb. Pt states prior to rectal cancer dx and colostomy in 2013 he typically weighed 155lb, reports prior active lifestyle. Pt reports prior intake of ONS in STR but none at home.\"    8/2023 encounter: At encounter today pt relates he continues with 2 meals per day am meal generally 2 packets of grits w 2 eggs and ground turkey, eats out one meal day last example 6 wings and 5-6 shrimps.  In addition to this he consumes one serving of 25 gram whey protein mixed in water.  He elects not to eat after 3pm secondary to concerns of volume of colostomy output and not wanting to make a mess while sleeping.    9/2023 encounter: Today pt tells me he has been eating less since discharge, maybe one meal per day which he generally orders or goes out to get.  12/1/2023: Limited hx obtained. Pt reports he eats twice a day and drinks whey    Inadequate oral intake related to altered GI function as evidenced by  (NPO/ CLD x 1 day, N/V, abdominal pain x 1 day PTA. current intake meeting of FLD 1-25% of estimated needs)  Nutrition Interventions:   Food and/or Nutrient Delivery: Continue Current Diet, Start Oral Nutrition Supplement     Coordination of Nutrition Care: Continue to monitor while inpatient      Goals:      Active Goal: Meet at least 75% of estimated needs, by next RD assessment       Nutrition Monitoring and Evaluation:      Food/Nutrient Intake Outcomes: Diet Advancement/Tolerance, Food and Nutrient Intake, Supplement Intake  Physical Signs/Symptoms Outcomes: Nutrition Focused Physical Findings, Weight, GI Status    Discharge Planning:    Continue Oral Nutrition Supplement    Tisha Taylor, SHANI,LD, [facility] [phone]

## 2024-01-02 NOTE — CARE COORDINATION
Per IDR, patient is very confused at this time.     CM called patient's friend/neighbor, Manju Guillermo, at [phone], to complete this assessment.Patient's demographic information was confirmed.  Patient was admitted from [facility]. Patient uses wheelchair has has history of falls. Patient has no home care services at this time. Patient's PCP information was confirmed but last PCP visit unknown. CM informed Manju PT/OT are recommending for patient to go to [facility]. Manuj is in agreement and wants [facility] to [facility] medically [facility] for discharge. CM sent referral accordingly.     No CM needs voiced or noted. CM will continue to follow patient for any other discharge planning needs.        01/02/24 3905   Service Assessment   Patient Orientation Other (see comment)  (Per IDR, patient is confused.)   Cognition Other (see comment)  (confused)   Primary Caregiver Self   Support Systems Friends/Neighbors   Patient's Healthcare Decision Maker is: Legal Next of Kin   PCP Verified by CM Yes  (confirmed PCP is Dr. Kathi Lawler)   Last Visit to PCP   (last PCP appointment unknown)   Prior Functional Level Independent in ADLs/IADLs   Current Functional Level Other (see comment)  (TBD by clinicals)   Can patient return to prior living arrangement Yes   Ability to make needs known: Good   Family able to assist with home care needs: Yes   Would you like for me to discuss the discharge plan with any other family members/significant others, and if so, who? Yes   Financial Resources Medicare;Other (Comment)  (Medicare Part A and B and [facility])   Community Resources None   CM/SW Referral Other (see comment)  (discharge planning)   Social/Functional History   Lives With Alone   Type of Home House   Home Layout One level   Home Access Level entry   Bathroom Shower/Tub Tub/Shower unit   Bathroom Toilet Standard   Bathroom Equipment None   Bathroom Accessibility Accessible   Home Equipment Wheelchair-manual    Receives Help From Friend(s)   ADL Assistance Independent   Homemaking Assistance Independent   Homemaking Responsibilities No   Ambulation Assistance Independent   Transfer Assistance Independent   Active  No   Patient's  Info patient's friend   Occupation Retired   Discharge Planning   Type of Residence Skilled Nursing Facility   Living Arrangements Alone   Current Services Prior To Admission Durable Medical Equipment   Current DME Prior to Arrival Wheelchair   Potential Assistance Needed Skilled Nursing Facility   DME Ordered? No   Potential Assistance Purchasing Medications No  (No barriers reported)   Patient expects to be discharged to: Skilled nursing facility   Follow Up Appointment: Best Day/Time  Monday AM   One/Two Story Residence One story   History of falls? 1   Services At/After Discharge   Transition of Care Consult (CM Consult) Discharge Planning   Services At/After Discharge Skilled Nursing Facility (SNF)   [place] Resource Information Provided? No   Mode of Transport at Discharge BLS   Confirm Follow Up Transport Friends   Condition of Participation: Discharge Planning   The Plan for Transition of Care is related to the following treatment goals: anticipates going to SNF   The Patient and/or Patient Representative was provided with a Choice of Provider? Patient   The Patient and/Or Patient Representative agree with the Discharge Plan? Yes   Freedom of Choice list was provided with basic dialogue that supports the patient's individualized plan of care/goals, treatment preferences, and shares the quality data associated with the providers?  Yes

## 2024-01-02 NOTE — CARE COORDINATION
Readmit from [facility] 12/31/2023 dx. SBO. Will close program with possible reassignment upon discharge.

## 2024-01-02 NOTE — PROGRESS NOTES
END OF SHIFT NOTE:    INTAKE/OUTPUT  01/01 0701 - 01/02 0700  In: -   Out: 800 [Urine:800]  Voiding: Yes  Catheter: Yes  Drain:   Colostomy LLQ (Active)       Urinary Catheter 01/01/24 (Active)   Catheter Indications Urinary retention (acute or chronic), continuous bladder irrigation or bladder outlet obstruction 01/02/24 0730   Site Assessment No urethral drainage 01/02/24 0730   Urine Color Yellow 01/02/24 0730   Urine Appearance Cloudy 01/02/24 0730   Collection Container Standard 01/02/24 0730   Securement Method Leg strap 01/02/24 0730   Catheter Care  Perineal wipes 01/02/24 0730   Catheter Best Practices  Catheter secured to thigh;Bag below bladder;Bag not on floor;Lack of dependent loop in tubing;Drainage bag less than half full;Drainage tube clipped to bed 01/02/24 0730   Status Draining;Patent 01/02/24 0730   Output (mL) 700 mL 01/02/24 1235               Flatus: Patient does have flatus present.    Stool:  occurrences.  0ml scant  Characteristics:  Stool Appearance: Watery, Loose  Stool Color: Brown     Stool Assessment  Stool Appearance: Watery, Loose  Stool Color: Brown  Stool Source: Colostomy  Last BM (including prior to admit): 01/02/24    Emesis:  occurrences.  0  Characteristics:        VITAL SIGNS  Patient Vitals for the past 12 hrs:   Temp Pulse Resp BP SpO2   01/02/24 1618 97.4 °F (36.3 °C) 79 16 (!) 172/65 98 %   01/02/24 1100 97.3 °F (36.3 °C) 70 16 (!) 140/88 100 %   01/02/24 0810 97.9 °F (36.6 °C) 100 16 (!) 152/79 96 %       Pain Assessment  Pain Level: 0 (01/01/24 1935)  Pain Location: Abdomen  Patient's Stated Pain Goal: 0 - No pain    Ambulating  Yes    Shift report given to oncoming nurse at the bedside.    Emilie Alba RN

## 2024-01-02 NOTE — PLAN OF CARE
Problem: Discharge Planning  Goal: Discharge to home or other facility with appropriate resources  Outcome: Progressing     Problem: Skin/Tissue Integrity  Goal: Absence of new skin breakdown  Description: 1.  Monitor for areas of redness and/or skin breakdown  2.  Assess vascular access sites hourly  3.  Every 4-6 hours minimum:  Change oxygen saturation probe site  4.  Every 4-6 hours:  If on nasal continuous positive airway pressure, respiratory therapy assess nares and determine need for appliance change or resting period.  Outcome: Progressing     Problem: Safety - Adult  Goal: Free from fall injury  Outcome: Progressing  Flowsheets (Taken 1/1/2024 1834 by Poonam Silverman RN)  Free From Fall Injury: Instruct family/caregiver on patient safety     Problem: ABCDS Injury Assessment  Goal: Absence of physical injury  Outcome: Progressing     Problem: Safety - Medical Restraint  Goal: Remains free of injury from restraints (Restraint for Interference with Medical Device)  Description: INTERVENTIONS:  1. Determine that other, less restrictive measures have been tried or would not be effective before applying the restraint  2. Evaluate the patient's condition at the time of restraint application  3. Inform patient/family regarding the reason for restraint  4. Q2H: Monitor safety, psychosocial status, comfort, nutrition and hydration  Outcome: Progressing

## 2024-01-02 NOTE — PROGRESS NOTES
Patient pulled out the NGT. He had it placed for decompression and it was clamped. Would you like to replace or leave out. Per the primary RN patient, patient is poitive for stool in the ostomy. Hospitalist notified. No new order at this time. If patient becomes more distended or ABD pain returns, please notify MD.

## 2024-01-02 NOTE — PROGRESS NOTES
Hospitalist Progress Note   Admit Date:  2023 11:03 AM   Name:  Peter Nolasco   Age:  78 y.o.  Sex:  male  :  1945   MRN:  [id number]   Room:  222/    Presenting/Chief Complaint: Emesis     Reason(s) for Admission: Dehydration [E86.0]  Small bowel obstruction (HCC) [K56.609]  Small bowel obstruction due to adhesions (HCC) [K56.50]  Urinary tract infection associated with indwelling urethral catheter, initial encounter (HCC) [T83.511A, N39.0]  Nausea and vomiting, unspecified vomiting type [R11.2]     Hospital Course:   Mr. Nolasco is a nice 77 y/o WM with a h/o chronic urinary retention with chronic indwelling John s/p cystoscopy with right ureteral stent exchange and John catheter exchanged on , h/o rectal cancer s/p colostomy, bipolar disorder, and hypothyroidism who was admitted to our service on  with SBO. He presented to the ER from [facility] with abdominal pain and N/V. CT a/p showed SBO, NGT placed. CT also noted possible posterior fluid near the urinary bladder concerning for chronic fistula or contained perforation. He was admitted and started on IVFs, Surgery and Urology consulted.    Subjective & 24hr Events:   Seen and examined at bedside. No acute events overnight. Patient is confused and not able to participate adequately. Currently in b/l soft restraints       Assessment & Plan:   # SBO   - Con't NGT to LIS, bowel rest, IVFs, replace lytes PRN, supportive care, Surgery consulted.    # ? CA-UTI   - Con't Zosyn given h/o PsA   - Follow cultures    # Acute RSV infection   - On RA, supportive care.    # KEVAN   - Resolved. Baseline sCr ~1. Was 1.5 on admission, improved to 1.2.    # Possible bladder fistula vs contained perforation   - Noted on CT findings, Urology is consulted.    # Generalized weakness   - Therapy evals    # HTN   - IV metoprolol PRN    # MDD // Bipolar d/o   - PRN IV lorazepam and Zyprexa since NPO    # Hypothyroidism   - Resume when able to take     MPV 8.6 (L) 9.4 - 12.3 FL    nRBC 0.00 0.0 - 0.2 K/uL   Magnesium    Collection Time: 01/02/24  5:53 AM   Result Value Ref Range    Magnesium 1.6 (L) 1.8 - 2.4 mg/dL       Current Meds:  Current Facility-Administered Medications   Medication Dose Route Frequency    OLANZapine (ZyPREXA) 5 mg in sterile water 1 mL injection  5 mg IntraMUSCular QHS    carvedilol (COREG) tablet 6.25 mg  6.25 mg Oral BID WC    hydrOXYzine HCl (ATARAX) tablet 50 mg  50 mg Oral BID    levothyroxine (SYNTHROID) tablet 125 mcg  125 mcg Oral Daily    oxyBUTYnin (DITROPAN-XL) extended release tablet 10 mg  10 mg Oral Daily    sodium chloride flush 0.9 % injection 5-40 mL  5-40 mL IntraVENous 2 times per day    sodium chloride flush 0.9 % injection 5-40 mL  5-40 mL IntraVENous PRN    0.9 % sodium chloride infusion   IntraVENous PRN    ondansetron (ZOFRAN-ODT) disintegrating tablet 4 mg  4 mg Oral Q8H PRN    Or    ondansetron (ZOFRAN) injection 4 mg  4 mg IntraVENous Q6H PRN    polyethylene glycol (GLYCOLAX) packet 17 g  17 g Oral Daily PRN    acetaminophen (TYLENOL) tablet 650 mg  650 mg Oral Q6H PRN    Or    acetaminophen (TYLENOL) suppository 650 mg  650 mg Rectal Q6H PRN    lactated ringers IV soln infusion   IntraVENous Continuous    famotidine (PEPCID) 20 mg in sodium chloride (PF) 0.9 % 10 mL injection  20 mg IntraVENous Q24H    tuberculin injection 5 Units  5 Units IntraDERmal Once    piperacillin-tazobactam (ZOSYN) 3,375 mg in sodium chloride 0.9 % 50 mL IVPB (mini-bag)  3,375 mg IntraVENous Q8H    LORazepam (ATIVAN) 1 mg in sodium chloride (PF) 0.9 % 10 mL injection  1 mg IntraVENous Nightly PRN       Signed:  Elvi Cook MD    Part of this note may have been written by using a voice dictation software.  The note has been proof read but may still contain some grammatical/other typographical errors.

## 2024-01-02 NOTE — PROGRESS NOTES
US Guided PIV access    Called for assistance with IV access. Ultrasound was used to find the vein which was compressible and does not have any features of an artery or nerve bundle. Skin was cleaned and disinfected prior to IV puncture. Under real-time ultrasound guidance, peripheral access was obtained in the left AC using 20 G 1.75\" Peripheral IV catheter x 3 attempt. Blood return was present and IV flushed without difficulty. IV dressing applied, no immediate complications noted, and patient tolerated the procedure well.       US Guided PIV access    Called for assistance with IV access. Ultrasound was used to find the vein which was compressible and does not have any features of an artery or nerve bundle. Skin was cleaned and disinfected prior to IV puncture. Under real-time ultrasound guidance, peripheral access was obtained in the right upper arm using 20 G 1.75\" Peripheral IV catheter x 1 attempt. Blood return was present and IV flushed without difficulty. IV dressing applied, no immediate complications noted, and patient tolerated the procedure well.

## 2024-01-03 LAB
ANION GAP SERPL CALC-SCNC: 8 MMOL/L (ref 2–11)
BUN SERPL-MCNC: 26 MG/DL (ref 8–23)
CALCIUM SERPL-MCNC: 8.5 MG/DL (ref 8.3–10.4)
CHLORIDE SERPL-SCNC: 106 MMOL/L (ref 103–113)
CO2 SERPL-SCNC: 22 MMOL/L (ref 21–32)
CREAT SERPL-MCNC: 1.1 MG/DL (ref 0.8–1.5)
GLUCOSE SERPL-MCNC: 73 MG/DL (ref 65–100)
POTASSIUM SERPL-SCNC: 3.7 MMOL/L (ref 3.5–5.1)
SODIUM SERPL-SCNC: 136 MMOL/L (ref 136–146)

## 2024-01-03 PROCEDURE — 2580000003 HC RX 258: Performed by: SURGERY

## 2024-01-03 PROCEDURE — 6360000002 HC RX W HCPCS: Performed by: NURSE PRACTITIONER

## 2024-01-03 PROCEDURE — 80048 BASIC METABOLIC PNL TOTAL CA: CPT

## 2024-01-03 PROCEDURE — 36415 COLL VENOUS BLD VENIPUNCTURE: CPT

## 2024-01-03 PROCEDURE — 6360000002 HC RX W HCPCS: Performed by: INTERNAL MEDICINE

## 2024-01-03 PROCEDURE — 2500000003 HC RX 250 WO HCPCS: Performed by: INTERNAL MEDICINE

## 2024-01-03 PROCEDURE — 6360000002 HC RX W HCPCS: Performed by: FAMILY MEDICINE

## 2024-01-03 PROCEDURE — 2580000003 HC RX 258: Performed by: FAMILY MEDICINE

## 2024-01-03 PROCEDURE — A4216 STERILE WATER/SALINE, 10 ML: HCPCS | Performed by: INTERNAL MEDICINE

## 2024-01-03 PROCEDURE — 6360000002 HC RX W HCPCS: Performed by: SURGERY

## 2024-01-03 PROCEDURE — 99232 SBSQ HOSP IP/OBS MODERATE 35: CPT | Performed by: SURGERY

## 2024-01-03 PROCEDURE — 2580000003 HC RX 258: Performed by: INTERNAL MEDICINE

## 2024-01-03 PROCEDURE — 97112 NEUROMUSCULAR REEDUCATION: CPT

## 2024-01-03 PROCEDURE — 6370000000 HC RX 637 (ALT 250 FOR IP): Performed by: INTERNAL MEDICINE

## 2024-01-03 PROCEDURE — 97530 THERAPEUTIC ACTIVITIES: CPT

## 2024-01-03 PROCEDURE — A4216 STERILE WATER/SALINE, 10 ML: HCPCS | Performed by: FAMILY MEDICINE

## 2024-01-03 PROCEDURE — 1100000000 HC RM PRIVATE

## 2024-01-03 RX ADMIN — HYDROXYZINE HYDROCHLORIDE 50 MG: 25 TABLET, FILM COATED ORAL at 21:23

## 2024-01-03 RX ADMIN — PIPERACILLIN AND TAZOBACTAM 3375 MG: 3; .375 INJECTION, POWDER, FOR SOLUTION INTRAVENOUS at 08:43

## 2024-01-03 RX ADMIN — HYDRALAZINE HYDROCHLORIDE 5 MG: 20 INJECTION, SOLUTION INTRAMUSCULAR; INTRAVENOUS at 00:07

## 2024-01-03 RX ADMIN — SODIUM CHLORIDE, PRESERVATIVE FREE 10 ML: 5 INJECTION INTRAVENOUS at 08:39

## 2024-01-03 RX ADMIN — PIPERACILLIN AND TAZOBACTAM 3375 MG: 3; .375 INJECTION, POWDER, FOR SOLUTION INTRAVENOUS at 16:09

## 2024-01-03 RX ADMIN — SODIUM CHLORIDE, PRESERVATIVE FREE 10 ML: 5 INJECTION INTRAVENOUS at 21:42

## 2024-01-03 RX ADMIN — WATER 5 MG: 1 INJECTION INTRAMUSCULAR; INTRAVENOUS; SUBCUTANEOUS at 21:24

## 2024-01-03 RX ADMIN — SODIUM CHLORIDE, POTASSIUM CHLORIDE, SODIUM LACTATE AND CALCIUM CHLORIDE: 600; 310; 30; 20 INJECTION, SOLUTION INTRAVENOUS at 02:40

## 2024-01-03 RX ADMIN — SODIUM CHLORIDE, POTASSIUM CHLORIDE, SODIUM LACTATE AND CALCIUM CHLORIDE: 600; 310; 30; 20 INJECTION, SOLUTION INTRAVENOUS at 10:43

## 2024-01-03 RX ADMIN — OXYBUTYNIN CHLORIDE 10 MG: 10 TABLET, EXTENDED RELEASE ORAL at 08:39

## 2024-01-03 RX ADMIN — LEVOTHYROXINE SODIUM 125 MCG: 0.12 TABLET ORAL at 05:28

## 2024-01-03 RX ADMIN — SODIUM CHLORIDE, PRESERVATIVE FREE 1 MG: 5 INJECTION INTRAVENOUS at 16:07

## 2024-01-03 RX ADMIN — CARVEDILOL 6.25 MG: 6.25 TABLET, FILM COATED ORAL at 16:05

## 2024-01-03 RX ADMIN — HYDROXYZINE HYDROCHLORIDE 50 MG: 25 TABLET, FILM COATED ORAL at 08:39

## 2024-01-03 RX ADMIN — PIPERACILLIN AND TAZOBACTAM 3375 MG: 3; .375 INJECTION, POWDER, FOR SOLUTION INTRAVENOUS at 00:17

## 2024-01-03 RX ADMIN — SODIUM CHLORIDE, PRESERVATIVE FREE 1 MG: 5 INJECTION INTRAVENOUS at 00:08

## 2024-01-03 RX ADMIN — CARVEDILOL 6.25 MG: 6.25 TABLET, FILM COATED ORAL at 08:39

## 2024-01-03 RX ADMIN — SODIUM CHLORIDE, POTASSIUM CHLORIDE, SODIUM LACTATE AND CALCIUM CHLORIDE: 600; 310; 30; 20 INJECTION, SOLUTION INTRAVENOUS at 19:50

## 2024-01-03 RX ADMIN — SODIUM CHLORIDE, PRESERVATIVE FREE 20 MG: 5 INJECTION INTRAVENOUS at 21:23

## 2024-01-03 ASSESSMENT — PAIN SCALES - GENERAL: PAINLEVEL_OUTOF10: 0

## 2024-01-03 NOTE — PROGRESS NOTES
END OF SHIFT NOTE:    INTAKE/OUTPUT  01/02 0701 - 01/03 0700  In: 2940.8 [I.V.:2673.2]  Out: 2550 [Urine:2550]  Voiding: Yes  Catheter: Yes (carr)  Drain:   Colostomy LLQ (Active)   Stomal Appliance Changed;Clean, dry & intact 01/03/24 0048   Stoma  Assessment Retracted;Moist;Red;Pink 01/03/24 0048   Peristomal Assessment Clean, dry & intact;Pink 01/03/24 0048   Treatment Heat applied 01/03/24 0048   Stool Appearance Soft 01/03/24 0048   Stool Color Brown 01/03/24 0048   Stool Amount Smear 01/03/24 0048       Urinary Catheter 01/01/24 (Active)   Catheter Indications Urinary retention (acute or chronic), continuous bladder irrigation or bladder outlet obstruction 01/02/24 1935   Site Assessment No urethral drainage 01/02/24 1935   Urine Color Yellow 01/02/24 1935   Urine Appearance Cloudy 01/02/24 1935   Collection Container Standard 01/02/24 1935   Securement Method Leg strap 01/02/24 1935   Catheter Care  Perineal wipes 01/02/24 1935   Catheter Best Practices  Catheter secured to thigh;Bag below bladder;Bag not on floor;Lack of dependent loop in tubing;Drainage bag less than half full;Drainage tube clipped to bed 01/02/24 1935   Status Draining;Patent 01/02/24 1935   Output (mL) 150 mL 01/03/24 0311               Flatus: Patient does have flatus present.    Stool:  occurrences.    Characteristics:  Stool Appearance: Watery, Loose  Stool Color: Brown     Stool Assessment  Stool Appearance: Watery, Loose  Stool Color: Brown  Stool Source: Colostomy  Last BM (including prior to admit): 01/02/24    Emesis:  occurrences.    Characteristics:        VITAL SIGNS  Patient Vitals for the past 12 hrs:   Temp Pulse Resp BP SpO2   01/03/24 0648 97.7 °F (36.5 °C) 70 17 (!) 174/85 (!) 85 %   01/03/24 0311 97.6 °F (36.4 °C) 77 15 (!) 124/47 96 %   01/03/24 0042 -- -- -- (!) 142/98 --   01/02/24 2324 97.6 °F (36.4 °C) 100 15 (!) 185/96 98 %   01/02/24 1924 97.9 °F (36.6 °C) 67 15 (!) 176/81 97 %       Pain Assessment  Pain Level:  0 (01/02/24 1935)  Pain Location: Abdomen  Patient's Stated Pain Goal: 0 - No pain    Ambulating  No    Shift report given to oncoming nurse at the bedside.    Daxa Ortega RN

## 2024-01-03 NOTE — PROGRESS NOTES
Hospitalist Progress Note   Admit Date:  2023 11:03 AM   Name:  Peter Nolasco   Age:  78 y.o.  Sex:  male  :  1945   MRN:  [id number]   Room:  /    Presenting/Chief Complaint: Emesis     Reason(s) for Admission: Dehydration [E86.0]  Small bowel obstruction (HCC) [K56.609]  Small bowel obstruction due to adhesions (HCC) [K56.50]  Urinary tract infection associated with indwelling urethral catheter, initial encounter (HCC) [T83.511A, N39.0]  Nausea and vomiting, unspecified vomiting type [R11.2]     Hospital Course:   Mr. Nolasco is a nice 77 y/o WM with a h/o chronic urinary retention with chronic indwelling John s/p cystoscopy with right ureteral stent exchange and John catheter exchanged on , h/o rectal cancer s/p colostomy, bipolar disorder, and hypothyroidism who was admitted to our service on  with SBO. He presented to the ER from [facility] with abdominal pain and N/V. CT a/p showed SBO, NGT placed. CT also noted possible posterior fluid near the urinary bladder concerning for chronic fistula or contained perforation. He was admitted and started on IVFs, Surgery and Urology consulted.     Patient was admitted to the floor and was decompressed with NG tube suction.  Patient's condition slowly improved.  He was started on liquid diet and slowly advance.    Subjective & 24hr Events:   Patient was seen and evaluated at bedside this morning.  States that he is feeling better.  Patient states that he is eating his liquid diet although status is otherwise.  No other issues noted.      Assessment & Plan:       Small bowel obstruction  Symptoms are resolving.  Patient currently on full liquid diet.  Surgery is following.  -Continue full liquid diet, advance as tolerated  -Follow-up GI recommendation  -Monitor ostomy output    Catheter associated UTI  Patient has indwelling John.  Urine culture growing mixed hay.  Initially there was concern for possible bladder fistula.  Patient    01/03/24 1235 98.1 °F (36.7 °C) 82 16 139/76 96 %   01/03/24 0811 -- 86 -- (!) 150/73 --   01/03/24 0648 97.7 °F (36.5 °C) 70 17 (!) 174/85 (!) 85 %   01/03/24 0311 97.6 °F (36.4 °C) 77 15 (!) 124/47 96 %   01/03/24 0042 -- -- -- (!) 142/98 --   01/02/24 2324 97.6 °F (36.4 °C) 100 15 (!) 185/96 98 %   01/02/24 1924 97.9 °F (36.6 °C) 67 15 (!) 176/81 97 %   01/02/24 1815 -- -- -- (!) 154/69 --   01/02/24 1618 97.4 °F (36.3 °C) 79 16 (!) 172/65 98 %       Oxygen Therapy  SpO2: 96 %  Pulse via Oximetry: 79 beats per minute  O2 Device: None (Room air)    Estimated body mass index is 15.77 kg/m² as calculated from the following:    Height as of this encounter: 1.727 m (5' 8\").    Weight as of this encounter: 47 kg (103 lb 11.2 oz).    Intake/Output Summary (Last 24 hours) at 1/3/2024 1355  Last data filed at 1/3/2024 0803  Gross per 24 hour   Intake 2940.81 ml   Output 2450 ml   Net 490.81 ml         Physical Exam:   General:    Cachectic, no acute distress.  Head:  Normocephalic, atraumatic  Eyes:  Sclerae appear normal.  Pupils equally round.  ENT:  Nares appear normal.  Moist oral mucosa  Neck:  No restricted ROM.  Trachea midline   CV:   RRR.  No m/r/g.  No jugular venous distension.  Lungs:   CTAB.  No wheezing, rhonchi, or rales.  Symmetric expansion.  Abdomen:   Soft, mildly tender to epigastric area, nondistended.  Bowel sounds in all 4 quadrants.  Extremities: No cyanosis or clubbing.  No edema  Skin:     No rashes and normal coloration.   Warm and dry.    Neuro:  CN II-XII grossly intact.  AO x 2.  Psych:  Normal mood and affect.      I have personally reviewed labs and tests:  Recent Labs:  Recent Results (from the past 48 hour(s))   Basic Metabolic Panel w/ Reflex to MG    Collection Time: 01/02/24  5:53 AM   Result Value Ref Range    Sodium 139 136 - 146 mmol/L    Potassium 3.3 (L) 3.5 - 5.1 mmol/L    Chloride 108 103 - 113 mmol/L    CO2 24 21 - 32 mmol/L    Anion Gap 7 2 - 11 mmol/L    Glucose 72 65 - 100  mg/dL    BUN 32 (H) 8 - 23 MG/DL    Creatinine 1.20 0.8 - 1.5 MG/DL    Est, Glom Filt Rate >60 >60 ml/min/1.73m2    Calcium 8.5 8.3 - 10.4 MG/DL   CBC    Collection Time: 01/02/24  5:53 AM   Result Value Ref Range    WBC 7.9 4.3 - 11.1 K/uL    RBC 3.49 (L) 4.23 - 5.6 M/uL    Hemoglobin 10.0 (L) 13.6 - 17.2 g/dL    Hematocrit 30.4 (L) 41.1 - 50.3 %    MCV 87.1 82 - 102 FL    MCH 28.7 26.1 - 32.9 PG    MCHC 32.9 31.4 - 35.0 g/dL    RDW 12.3 11.9 - 14.6 %    Platelets 291 150 - 450 K/uL    MPV 8.6 (L) 9.4 - 12.3 FL    nRBC 0.00 0.0 - 0.2 K/uL   Magnesium    Collection Time: 01/02/24  5:53 AM   Result Value Ref Range    Magnesium 1.6 (L) 1.8 - 2.4 mg/dL   Basic Metabolic Panel w/ Reflex to MG    Collection Time: 01/03/24  3:56 AM   Result Value Ref Range    Sodium 136 136 - 146 mmol/L    Potassium 3.7 3.5 - 5.1 mmol/L    Chloride 106 103 - 113 mmol/L    CO2 22 21 - 32 mmol/L    Anion Gap 8 2 - 11 mmol/L    Glucose 73 65 - 100 mg/dL    BUN 26 (H) 8 - 23 MG/DL    Creatinine 1.10 0.8 - 1.5 MG/DL    Est, Glom Filt Rate >60 >60 ml/min/1.73m2    Calcium 8.5 8.3 - 10.4 MG/DL       Current Meds:  Current Facility-Administered Medications   Medication Dose Route Frequency    piperacillin-tazobactam (ZOSYN) 3,375 mg in sodium chloride 0.9 % 50 mL IVPB (mini-bag)  3,375 mg IntraVENous Q8H    hydrALAZINE (APRESOLINE) injection 5 mg  5 mg IntraVENous Q6H PRN    OLANZapine (ZyPREXA) 5 mg in sterile water 1 mL injection  5 mg IntraMUSCular QHS    carvedilol (COREG) tablet 6.25 mg  6.25 mg Oral BID WC    hydrOXYzine HCl (ATARAX) tablet 50 mg  50 mg Oral BID    levothyroxine (SYNTHROID) tablet 125 mcg  125 mcg Oral Daily    oxyBUTYnin (DITROPAN-XL) extended release tablet 10 mg  10 mg Oral Daily    sodium chloride flush 0.9 % injection 5-40 mL  5-40 mL IntraVENous 2 times per day    sodium chloride flush 0.9 % injection 5-40 mL  5-40 mL IntraVENous PRN    0.9 % sodium chloride infusion   IntraVENous PRN    ondansetron (ZOFRAN-ODT)

## 2024-01-03 NOTE — PROGRESS NOTES
ACUTE PHYSICAL THERAPY GOALS:   (Developed with and agreed upon by patient and/or caregiver.)  (1.) Peter Nolasco  will move from supine to sit and sit to supine , scoot up and down, and roll side to side with STAND BY ASSIST within 7 treatment day(s).    (2.) Peter Nolasco will transfer from bed to chair and chair to bed with STAND BY ASSIST using the least restrictive device within 7 treatment day(s).    (3.) Peter Nolasco will ambulate with CONTACT GUARD ASSIST for 500+ feet with the least restrictive device within 7 treatment day(s).   (4.) Peter Nolasco will perform standing static and dynamic balance activities x 25 minutes with STAND BY ASSIST to improve safety within 7 treatment day(s).  (5.) Peter Nolasco will perform therapeutic exercises x 20 min for HEP with SUPERVISION to improve strength, endurance, and functional mobility within 7 treatment day(s).         PHYSICAL THERAPY: Daily Note PM   (Link to Caseload Tracking: PT Visit Days : 2  Time In/Out PT Charge Capture  Rehab Caseload Tracker  Orders    Peter Nolasco is a 78 y.o. male   PRIMARY DIAGNOSIS: Small bowel obstruction (HCC)  Dehydration [E86.0]  Small bowel obstruction (HCC) [K56.609]  Small bowel obstruction due to adhesions (HCC) [K56.50]  Urinary tract infection associated with indwelling urethral catheter, initial encounter (HCC) [T83.511A, N39.0]  Nausea and vomiting, unspecified vomiting type [R11.2]       Inpatient: Payor: MEDICARE / Plan: MEDICARE PART A AND B / Product Type: *No Product type* /     ASSESSMENT:     REHAB RECOMMENDATIONS:   Recommendation to date pending progress:  Setting:  Short-term Rehab    Equipment:    To Be Determined     ASSESSMENT:  Mr. Nolasco is supine in bed with bilateral wrist restraints and a sitter.   OT present.  Patient is impulsive as once his wrist are released he jumps to the edge of the bed quickly.  Sitting balance edge of bed is good but  a little more forward.

## 2024-01-03 NOTE — PLAN OF CARE
Problem: Safety - Medical Restraint  Goal: Remains free of injury from restraints (Restraint for Interference with Medical Device)  Description: INTERVENTIONS:  1. Determine that other, less restrictive measures have been tried or would not be effective before applying the restraint  2. Evaluate the patient's condition at the time of restraint application  3. Inform patient/family regarding the reason for restraint  4. Q2H: Monitor safety, psychosocial status, comfort, nutrition and hydration  Recent Flowsheet Documentation  Taken 1/2/2024 1930 by Daxa Ortega RN  Remains free of injury from restraints (restraint for interference with medical device): Every 2 hours: Monitor safety, psychosocial status, comfort, nutrition and hydration

## 2024-01-03 NOTE — PROGRESS NOTES
Admit Date: 2023    POD * No surgery found *    Procedure:  * No surgery found *    Subjective:     Patient awake in bed. Pt with wrist restraints in place and sitter at bedside for safety. NG tube out. Pt denies pain, nausea, and vomiting.  Pt on FLD, however, has not been eating. Pt encouraged to eat/ drink. AF, NAD.  Objective:       Vitals:    24 0042 24 0311 24 0648 24 0811   BP: (!) 142/98 (!) 124/47 (!) 174/85 (!) 150/73   Pulse:  77 70 86   Resp:  15 17    Temp:  97.6 °F (36.4 °C) 97.7 °F (36.5 °C)    TempSrc:  Oral Oral    SpO2:  96% (!) 85%    Weight:       Height:           Temp (24hrs), Av.6 °F (36.4 °C), Min:97.3 °F (36.3 °C), Max:97.9 °F (36.6 °C)  .  I&O reviewed as documented.    [unfilled]     Physical Exam:   Constitutional: Thin, Alert, cooperative, no acute distress; appears stated age   Eyes: Sclera are clear. EOMs intact  ENMT: no external lesions' gross hearing normal; no obvious neck masses, no ear or lip lesions, nares normal  CV: RRR. Normal perfusion  Resp: No JVD.  Breathing is  non-labored; no audible wheezing.    GI: soft and non-distended, non-tender, Colostomy bag in place with empty bag.     : John patent  Musculoskeletal:  No embolic signs or cyanosis.   Neuro:  moves all 4; no focal deficits  Psychiatric: cooperative but seems slightly agitated    Labs:   Recent Results (from the past 24 hour(s))   Basic Metabolic Panel w/ Reflex to MG    Collection Time: 24  3:56 AM   Result Value Ref Range    Sodium 136 136 - 146 mmol/L    Potassium 3.7 3.5 - 5.1 mmol/L    Chloride 106 103 - 113 mmol/L    CO2 22 21 - 32 mmol/L    Anion Gap 8 2 - 11 mmol/L    Glucose 73 65 - 100 mg/dL    BUN 26 (H) 8 - 23 MG/DL    Creatinine 1.10 0.8 - 1.5 MG/DL    Est, Glom Filt Rate >60 >60 ml/min/1.73m2    Calcium 8.5 8.3 - 10.4 MG/DL       All Data Reviewed    XR Results (most recent):  @[facility](VCS6645:1)@    Results (most  recent):  @[facility](AUC9362:1)@   Assessment:     Principal Problem:    Small bowel obstruction (HCC)  Active Problems:    Mild episode of recurrent major depressive disorder (HCC)    Bladder injury with open wound into cavity    Severe protein-calorie malnutrition (HCC)    Urinary retention    Chronic indwelling John catheter    Urinary tract infection associated with indwelling urethral catheter (HCC)    Difficulty in walking    Bipolar disorder, unspecified (HCC)    Primary hypertension    Acquired hypothyroidism    KEVAN (acute kidney injury) (HCC)    Abdominal pain    Nausea and vomiting    RSV infection  Resolved Problems:    * No resolved hospital problems. *        Plan/Recommendations/Medical Decision Making:   IV Abx - Zosyn (UTI)  IVF LR  Pain/ nausea control,  Contact Isolation  Full Liquids  Follow labs  Replace Lytes prn  OOB/ ambulate  I&O - document ostomy output     Kimberly A Frommel, ROSEP-BC    I have seen and examined the patient with the Nurse Practitioner and agree with the above assessment and plan.     No nausea/vomiting, abd soft, flatus in colostomy bag  Encourage oral intake.     Reyna Hodge MD

## 2024-01-03 NOTE — PROGRESS NOTES
ACUTE OCCUPATIONAL THERAPY GOALS:   (Developed with and agreed upon by patient and/or caregiver.)  1. Patient will complete lower body bathing and dressing with STAND BY ASSIST and adaptive equipment as needed.     2. Patient will complete toileting with STAND BY ASSIST.  3. Patient will complete grooming ADL in standing with STAND BY ASSIST.  4. Patient will tolerate 25 minutes of OT treatment with 1-2 rest breaks to increase activity tolerance for ADLs.   5. Patient will complete functional transfers with STAND BY ASSIST and adaptive equipment as needed.   6. Patient will tolerate 10 minutes BUE exercises to increase strength for safe, functional transfers.      Timeframe: 7 visits     OCCUPATIONAL THERAPY: Daily Note PM   OT Visit Days: 2   Time In/Out  OT Charge Capture  Rehab Caseload Tracker  OT Orders    Peter Nolasco is a 78 y.o. male   PRIMARY DIAGNOSIS: Small bowel obstruction (HCC)  Dehydration [E86.0]  Small bowel obstruction (HCC) [K56.609]  Small bowel obstruction due to adhesions (HCC) [K56.50]  Urinary tract infection associated with indwelling urethral catheter, initial encounter ([facility]) [T83.511A, N39.0]  Nausea and vomiting, unspecified vomiting type [R11.2]       Inpatient: Payor: MEDICARE / Plan: MEDICARE PART A AND B / Product Type: *No Product type* /     ASSESSMENT:     REHAB RECOMMENDATIONS: CURRENT LEVEL OF FUNCTION:  (Most Recently Demonstrated)   Recommendation to date pending progress:  Setting:  Short-term Rehab    Equipment:    To Be Determined Bathing:  Not Tested  Dressing:  Not Tested  Feeding/Grooming:  Not Tested  Toileting:  Not Tested  Functional Mobility:  Minimal Assist x2     ASSESSMENT:  Mr. Nolasco is doing fair today. Pt presents supine upon arrival in restraints. Pt performed bed mobility with no assistance (impulsive). Pt demonstrates good sitting balance at edge of bed. Pt was able to stand and attempted mobility out in hallway. Suddenly returned back to room and  to supine. No progress made today. Will continue to benefit from skilled OT during stay.       SUBJECTIVE:     Mr. Nolasco states, \"I need to lay back down\"     Social/Functional Lives With: Alone  Type of Home: House  Home Layout: One level  Home Access: Level entry  Bathroom Shower/Tub: Tub/Shower unit  Bathroom Toilet: Standard  Bathroom Equipment: None  Bathroom Accessibility: Accessible  Home Equipment: Wheelchair-manual  Has the patient had two or more falls in the past year or any fall with injury in the past year?: Yes  Receives Help From: Friend(s)  ADL Assistance: Independent  Homemaking Assistance: Independent  Homemaking Responsibilities: No  Ambulation Assistance: Independent  Transfer Assistance: Independent  Active : No  Patient's  Info: patient's friend  Occupation: Retired    OBJECTIVE:     LINES / DRAINS / AIRWAY: John Catheter    RESTRICTIONS/PRECAUTIONS:  Restrictions/Precautions  Restrictions/Precautions: Fall Risk, Bed Alarm        PAIN: VITALS / O2:   Pre Treatment:    0        Post Treatment: 0 Vitals          Oxygen        MOBILITY: I Mod I S SBA CGA Min Mod Max Total  NT x2 Comments:   Bed Mobility    Rolling [] [] [] [] [] [] [] [] [] [x] []    Supine to Sit [] [] [x] [] [] [] [] [] [] [] []    Scooting [] [] [] [x] [] [] [] [] [] [] []    Sit to Supine [] [] [] [x] [] [] [] [] [] [] []    Transfers    Sit to Stand [] [] [] [] [x] [] [] [] [] [] [x]    Bed to Chair [] [] [] [] [] [] [] [] [] [x] []    Stand to Sit [] [] [] [] [x] [] [] [] [] [] []    Tub/Shower [] [] [] [] [] [] [] [] [] [x] []     Toilet [] [] [] [] [] [] [] [] [] [x] []      [] [] [] [] [] [] [] [] [] [] []    I=Independent, Mod I=Modified Independent, S=Supervision/Setup, SBA=Standby Assistance, CGA=Contact Guard Assistance, Min=Minimal Assistance, Mod=Moderate Assistance, Max=Maximal Assistance, Total=Total Assistance, NT=Not Tested    ACTIVITIES OF DAILY LIVING: I Mod I S SBA CGA Min Mod Max Total NT

## 2024-01-03 NOTE — PROGRESS NOTES
END OF SHIFT NOTE:    INTAKE/OUTPUT  01/02 0701 - 01/03 0700  In: 2940.8 [I.V.:2673.2]  Out: 3150 [Urine:3150]  Voiding: Yes  Catheter: Yes  Drain:   Colostomy LLQ (Active)   Stomal Appliance 1 piece 01/03/24 0803   Stoma  Assessment Retracted;Moist;Red;Pink 01/03/24 0803   Peristomal Assessment Clean, dry & intact;Pink 01/03/24 0803   Treatment Heat applied 01/03/24 0048   Stool Appearance Loose 01/03/24 0803   Stool Color Yellow 01/03/24 0803   Stool Amount Smear 01/03/24 0803   Output (mL) 0 ml 01/03/24 1425       Urinary Catheter 01/01/24 (Active)   Catheter Indications Urinary retention (acute or chronic), continuous bladder irrigation or bladder outlet obstruction 01/03/24 0803   Site Assessment No urethral drainage 01/03/24 0803   Urine Color Yellow 01/03/24 0803   Urine Appearance Cloudy 01/03/24 0803   Collection Container Standard 01/03/24 0803   Securement Method Leg strap 01/03/24 0803   Catheter Care  Perineal wipes 01/02/24 1935   Catheter Best Practices  Catheter secured to thigh;Bag below bladder;Bag not on floor;Lack of dependent loop in tubing;Drainage bag less than half full;Drainage tube clipped to bed 01/03/24 0803   Status Draining;Patent 01/03/24 0803   Output (mL) 0 mL 01/03/24 0803               Flatus: Patient does have flatus present.    Stool:  occurrences.  0  Characteristics:  Stool Appearance: Watery, Loose  Stool Color: Brown     Stool Assessment  Stool Appearance: Watery, Loose  Stool Color: Brown  Stool Source: Colostomy  Last BM (including prior to admit): 01/02/24    Emesis:  occurrences.  0  Characteristics:        VITAL SIGNS  Patient Vitals for the past 12 hrs:   Temp Pulse Resp BP SpO2   01/03/24 1522 -- 79 18 (!) 149/71 --   01/03/24 1235 98.1 °F (36.7 °C) 82 16 139/76 96 %   01/03/24 0811 -- 86 -- (!) 150/73 --       Pain Assessment  Pain Level: 0 (01/02/24 1935)  Pain Location: Abdomen  Patient's Stated Pain Goal: 0 - No pain    Ambulating  Yes    Shift report given to  oncoming nurse at the bedside.    Emilie Alba RN

## 2024-01-03 NOTE — PROGRESS NOTES
2349: Patient admitted for SBO. Patients BP slowly elevating theougout the day. Patient currently in restraints. Hospitalist notified. New orders received for PRN Hydralazine SBP >185 / DBP >100.    0010: Restraint orders have . Hospitalist notified. New orders received.

## 2024-01-04 LAB
ANION GAP SERPL CALC-SCNC: 3 MMOL/L (ref 2–11)
BUN SERPL-MCNC: 17 MG/DL (ref 8–23)
CALCIUM SERPL-MCNC: 8.3 MG/DL (ref 8.3–10.4)
CHLORIDE SERPL-SCNC: 99 MMOL/L (ref 103–113)
CO2 SERPL-SCNC: 30 MMOL/L (ref 21–32)
CREAT SERPL-MCNC: 1 MG/DL (ref 0.8–1.5)
GLUCOSE SERPL-MCNC: 79 MG/DL (ref 65–100)
MAGNESIUM SERPL-MCNC: 1.9 MG/DL (ref 1.8–2.4)
MM INDURATION, POC: 0 MM (ref 0–5)
POTASSIUM SERPL-SCNC: 3.2 MMOL/L (ref 3.5–5.1)
PPD, POC: NEGATIVE
SODIUM SERPL-SCNC: 132 MMOL/L (ref 136–146)

## 2024-01-04 PROCEDURE — 1100000000 HC RM PRIVATE

## 2024-01-04 PROCEDURE — 6360000002 HC RX W HCPCS: Performed by: INTERNAL MEDICINE

## 2024-01-04 PROCEDURE — 2500000003 HC RX 250 WO HCPCS: Performed by: INTERNAL MEDICINE

## 2024-01-04 PROCEDURE — 83735 ASSAY OF MAGNESIUM: CPT

## 2024-01-04 PROCEDURE — 80048 BASIC METABOLIC PNL TOTAL CA: CPT

## 2024-01-04 PROCEDURE — 99231 SBSQ HOSP IP/OBS SF/LOW 25: CPT | Performed by: SURGERY

## 2024-01-04 PROCEDURE — 36415 COLL VENOUS BLD VENIPUNCTURE: CPT

## 2024-01-04 PROCEDURE — 2580000003 HC RX 258: Performed by: SURGERY

## 2024-01-04 PROCEDURE — 2580000003 HC RX 258: Performed by: INTERNAL MEDICINE

## 2024-01-04 PROCEDURE — 6370000000 HC RX 637 (ALT 250 FOR IP): Performed by: INTERNAL MEDICINE

## 2024-01-04 PROCEDURE — 6360000002 HC RX W HCPCS: Performed by: SURGERY

## 2024-01-04 RX ORDER — POTASSIUM CHLORIDE 7.45 MG/ML
10 INJECTION INTRAVENOUS PRN
Status: DISCONTINUED | OUTPATIENT
Start: 2024-01-04 | End: 2024-01-05 | Stop reason: HOSPADM

## 2024-01-04 RX ORDER — POTASSIUM CHLORIDE 20 MEQ/1
40 TABLET, EXTENDED RELEASE ORAL 2 TIMES DAILY
Status: DISPENSED | OUTPATIENT
Start: 2024-01-04 | End: 2024-01-05

## 2024-01-04 RX ORDER — MAGNESIUM SULFATE IN WATER 40 MG/ML
2000 INJECTION, SOLUTION INTRAVENOUS PRN
Status: DISCONTINUED | OUTPATIENT
Start: 2024-01-04 | End: 2024-01-05 | Stop reason: HOSPADM

## 2024-01-04 RX ORDER — POTASSIUM CHLORIDE 20 MEQ/1
40 TABLET, EXTENDED RELEASE ORAL PRN
Status: DISCONTINUED | OUTPATIENT
Start: 2024-01-04 | End: 2024-01-05 | Stop reason: HOSPADM

## 2024-01-04 RX ORDER — POTASSIUM CHLORIDE 20 MEQ/1
40 TABLET, EXTENDED RELEASE ORAL 2 TIMES DAILY
Status: ACTIVE | OUTPATIENT
Start: 2024-01-04 | End: 2024-01-05

## 2024-01-04 RX ADMIN — SODIUM CHLORIDE, PRESERVATIVE FREE 20 MG: 5 INJECTION INTRAVENOUS at 22:00

## 2024-01-04 RX ADMIN — SODIUM CHLORIDE, POTASSIUM CHLORIDE, SODIUM LACTATE AND CALCIUM CHLORIDE: 600; 310; 30; 20 INJECTION, SOLUTION INTRAVENOUS at 09:23

## 2024-01-04 RX ADMIN — OXYBUTYNIN CHLORIDE 10 MG: 10 TABLET, EXTENDED RELEASE ORAL at 09:10

## 2024-01-04 RX ADMIN — POTASSIUM CHLORIDE 40 MEQ: 1500 TABLET, EXTENDED RELEASE ORAL at 14:19

## 2024-01-04 RX ADMIN — LEVOTHYROXINE SODIUM 125 MCG: 0.12 TABLET ORAL at 04:59

## 2024-01-04 RX ADMIN — WATER 5 MG: 1 INJECTION INTRAMUSCULAR; INTRAVENOUS; SUBCUTANEOUS at 21:57

## 2024-01-04 RX ADMIN — PIPERACILLIN AND TAZOBACTAM 3375 MG: 3; .375 INJECTION, POWDER, FOR SOLUTION INTRAVENOUS at 17:52

## 2024-01-04 RX ADMIN — CARVEDILOL 6.25 MG: 6.25 TABLET, FILM COATED ORAL at 09:10

## 2024-01-04 RX ADMIN — SODIUM CHLORIDE, POTASSIUM CHLORIDE, SODIUM LACTATE AND CALCIUM CHLORIDE: 600; 310; 30; 20 INJECTION, SOLUTION INTRAVENOUS at 17:31

## 2024-01-04 RX ADMIN — PIPERACILLIN AND TAZOBACTAM 3375 MG: 3; .375 INJECTION, POWDER, FOR SOLUTION INTRAVENOUS at 00:23

## 2024-01-04 RX ADMIN — SODIUM CHLORIDE, PRESERVATIVE FREE 10 ML: 5 INJECTION INTRAVENOUS at 22:17

## 2024-01-04 RX ADMIN — SODIUM CHLORIDE, POTASSIUM CHLORIDE, SODIUM LACTATE AND CALCIUM CHLORIDE: 600; 310; 30; 20 INJECTION, SOLUTION INTRAVENOUS at 02:30

## 2024-01-04 RX ADMIN — HYDROXYZINE HYDROCHLORIDE 50 MG: 25 TABLET, FILM COATED ORAL at 22:10

## 2024-01-04 RX ADMIN — POTASSIUM CHLORIDE 40 MEQ: 1500 TABLET, EXTENDED RELEASE ORAL at 11:11

## 2024-01-04 RX ADMIN — HYDROXYZINE HYDROCHLORIDE 50 MG: 25 TABLET, FILM COATED ORAL at 09:10

## 2024-01-04 RX ADMIN — CARVEDILOL 6.25 MG: 6.25 TABLET, FILM COATED ORAL at 17:49

## 2024-01-04 RX ADMIN — PIPERACILLIN AND TAZOBACTAM 3375 MG: 3; .375 INJECTION, POWDER, FOR SOLUTION INTRAVENOUS at 09:10

## 2024-01-04 NOTE — PLAN OF CARE
Problem: Discharge Planning  Goal: Discharge to home or other facility with appropriate resources  Outcome: Progressing     Problem: Skin/Tissue Integrity  Goal: Absence of new skin breakdown  Description: 1.  Monitor for areas of redness and/or skin breakdown  2.  Assess vascular access sites hourly  3.  Every 4-6 hours minimum:  Change oxygen saturation probe site  4.  Every 4-6 hours:  If on nasal continuous positive airway pressure, respiratory therapy assess nares and determine need for appliance change or resting period.  Outcome: Progressing     Problem: Safety - Adult  Goal: Free from fall injury  Outcome: Progressing     Problem: ABCDS Injury Assessment  Goal: Absence of physical injury  Outcome: Progressing     Problem: Safety - Medical Restraint  Goal: Remains free of injury from restraints (Restraint for Interference with Medical Device)  Description: INTERVENTIONS:  1. Determine that other, less restrictive measures have been tried or would not be effective before applying the restraint  2. Evaluate the patient's condition at the time of restraint application  3. Inform patient/family regarding the reason for restraint  4. Q2H: Monitor safety, psychosocial status, comfort, nutrition and hydration  Outcome: Progressing  Flowsheets  Taken 1/3/2024 2226  Remains free of injury from restraints (restraint for interference with medical device): Every 2 hours: Monitor safety, psychosocial status, comfort, nutrition and hydration  Taken 1/3/2024 2026  Remains free of injury from restraints (restraint for interference with medical device): Every 2 hours: Monitor safety, psychosocial status, comfort, nutrition and hydration

## 2024-01-04 NOTE — PROGRESS NOTES
Hospitalist Progress Note   Admit Date:  2023 11:03 AM   Name:  Peter Nolasco   Age:  78 y.o.  Sex:  male  :  1945   MRN:  [id number]   Room:  222/    Presenting/Chief Complaint: Emesis     Reason(s) for Admission: Dehydration [E86.0]  Small bowel obstruction (HCC) [K56.609]  Small bowel obstruction due to adhesions (HCC) [K56.50]  Urinary tract infection associated with indwelling urethral catheter, initial encounter (HCC) [T83.511A, N39.0]  Nausea and vomiting, unspecified vomiting type [R11.2]     Hospital Course:   Mr. Nolasco is a nice 77 y/o WM with a h/o chronic urinary retention with chronic indwelling John s/p cystoscopy with right ureteral stent exchange and John catheter exchanged on , h/o rectal cancer s/p colostomy, bipolar disorder, and hypothyroidism who was admitted to our service on  with SBO. He presented to the ER from [facility] with abdominal pain and N/V. CT a/p showed SBO, NGT placed. CT also noted possible posterior fluid near the urinary bladder concerning for chronic fistula or contained perforation. He was admitted and started on IVFs, Surgery and Urology consulted.     Patient was admitted to the floor and was decompressed with NG tube suction.  Patient's condition slowly improved.  He was started on liquid diet and slowly advance.    Subjective & 24hr Events:   Patient was seen and evaluated at bedside this morning.  Patient noted to be in restraints this morning as he was agitated overnight.  Currently tolerating full liquid diet.  Denies any significant abdominal pain, nausea, vomiting.    Assessment & Plan:     Small bowel obstruction -resolved  Patient having gas and stool in ostomy bag.  Will continue to advance diet as tolerated.  Surge recommendations seen and noted.  -Advance to soft diet  -Follow-up GI recommendation  -Monitor ostomy output    Catheter associated UTI  Patient has indwelling John.  Urine culture growing mixed hay.  Initially  DISPLAY PLAN FREE TEXT DISPLAY PLAN FREE TEXT

## 2024-01-04 NOTE — PROGRESS NOTES
END OF SHIFT NOTE:    INTAKE/OUTPUT  01/03 0701 - 01/04 0700  In: 250 [P.O.:250]  Out: 1725 [Urine:1675]  Voiding: Yes  Catheter: Yes (carr)  Drain:   Colostomy LLQ (Active)   Stomal Appliance 1 piece 01/04/24 0517   Stoma  Assessment Retracted;Red;Moist 01/04/24 0517   Peristomal Assessment Clean, dry & intact;Pink 01/04/24 0517   Treatment Other (Comment) 01/04/24 0517   Stool Appearance Loose 01/04/24 0517   Stool Color Brown 01/04/24 0517   Stool Amount Small 01/04/24 0517   Output (mL) 50 ml 01/04/24 0517       Urinary Catheter 01/01/24 (Active)   Catheter Indications Urinary retention (acute or chronic), continuous bladder irrigation or bladder outlet obstruction 01/03/24 1915   Site Assessment No urethral drainage 01/03/24 1915   Urine Color Yellow 01/04/24 0442   Urine Appearance Clear 01/04/24 0442   Collection Container Standard 01/03/24 1915   Securement Method Leg strap 01/03/24 1915   Catheter Care  Perineal wipes 01/04/24 0442   Catheter Best Practices  Catheter secured to thigh;Bag below bladder;Bag not on floor;Lack of dependent loop in tubing;Drainage bag less than half full;Drainage tube clipped to bed 01/03/24 1915   Status Draining;Patent 01/03/24 1915   Output (mL) 1000 mL 01/04/24 0442               Flatus: Patient does have flatus present.    Stool:  occurrences.    Characteristics:  Stool Appearance: Watery, Loose  Stool Color: Brown     Stool Assessment  Stool Appearance: Watery, Loose  Stool Color: Brown  Stool Source: Colostomy  Last BM (including prior to admit): 01/02/24    Emesis:  occurrences.    Characteristics:        VITAL SIGNS  Patient Vitals for the past 12 hrs:   Temp Pulse Resp BP SpO2   01/04/24 0545 -- -- -- (!) 163/98 --   01/04/24 0215 -- -- -- (!) 149/79 --   01/04/24 0015 -- -- -- (!) 164/97 --   01/04/24 0000 -- -- -- (!) 163/100 --   01/03/24 2341 97.7 °F (36.5 °C) 64 16 (!) 160/89 100 %   01/03/24 2003 97.7 °F (36.5 °C) 78 17 (!) 145/82 90 %       Pain  Assessment  Pain Level: 0 (01/03/24 1915)  Pain Location: Abdomen  Patient's Stated Pain Goal: 0 - No pain    Ambulating  No    Shift report given to oncoming nurse at the bedside.    Daxa Ortega RN

## 2024-01-04 NOTE — CARE COORDINATION
CM spoke to Angelina at [facility] who confirmed patient has a bed available when he is medically stable for discharge.     CM to follow up.

## 2024-01-04 NOTE — PROGRESS NOTES
Admit Date: 2023    POD * No surgery found *    Procedure:  * No surgery found *    Subjective:     Patient resting in bed. Sitter at bedside for safety. Pt on FLD. Has not been eating a lot. May need assistance with feeding. AF, NAD.  Objective:       Vitals:    24 0215 24 0545 24 0630 24 0745   BP: (!) 149/79 (!) 163/98 (!) 164/90 126/62   Pulse:    86   Resp:       Temp:       TempSrc:       SpO2:       Weight:       Height:           Temp (24hrs), Av.8 °F (36.6 °C), Min:97.7 °F (36.5 °C), Max:98.1 °F (36.7 °C)  .  I&O reviewed as documented.    [unfilled]     Physical Exam:   Constitutional: Thin, Sleepy, cooperative, no acute distress; appears stated age   Eyes: Sclera are clear. EOMs intact  ENMT: no external lesions' gross hearing normal; no obvious neck masses, no ear or lip lesions, nares normal  CV: RRR. Normal perfusion  Resp: No JVD.  Breathing is  non-labored; no audible wheezing.    GI: soft and non-distended, non-tender, Colostomy bag in place with air and brown soft stool in bag     : John patent  Musculoskeletal:  No embolic signs or cyanosis.   Neuro:  moves all 4; no focal deficits  Psychiatric: sleepy    Labs:   Recent Results (from the past 24 hour(s))   Basic Metabolic Panel w/ Reflex to MG    Collection Time: 24  4:22 AM   Result Value Ref Range    Sodium 132 (L) 136 - 146 mmol/L    Potassium 3.2 (L) 3.5 - 5.1 mmol/L    Chloride 99 (L) 103 - 113 mmol/L    CO2 30 21 - 32 mmol/L    Anion Gap 3 2 - 11 mmol/L    Glucose 79 65 - 100 mg/dL    BUN 17 8 - 23 MG/DL    Creatinine 1.00 0.8 - 1.5 MG/DL    Est, Glom Filt Rate >60 >60 ml/min/1.73m2    Calcium 8.3 8.3 - 10.4 MG/DL   Magnesium    Collection Time: 24  4:22 AM   Result Value Ref Range    Magnesium 1.9 1.8 - 2.4 mg/dL       All Data Reviewed    XR Results (most recent):  @Candy LabCoineySTZookalGCA(VNI3340:1)@    Results (most recent):  @GigaPanSTZookalGCAT(VHO3440:1)@   Assessment:     Principal  Problem:    Small bowel obstruction (HCC)  Active Problems:    Mild episode of recurrent major depressive disorder (HCC)    Bladder injury with open wound into cavity    Severe protein-calorie malnutrition (HCC)    Urinary retention    Chronic indwelling John catheter    Urinary tract infection associated with indwelling urethral catheter (HCC)    Difficulty in walking    Bipolar disorder, unspecified (HCC)    Primary hypertension    Acquired hypothyroidism    KEVAN (acute kidney injury) (HCC)    Abdominal pain    Nausea and vomiting    RSV infection  Resolved Problems:    * No resolved hospital problems. *        Plan/Recommendations/Medical Decision Making:   IV Abx - Zosyn (UTI)  IVF LR  Pain/ nausea control,  Contact Isolation  Full Liquids - ok to advance to soft diet as tolerated  Follow labs  Replace Lytes prn  OOB/ ambulate  I&O - document ostomy output   Pt with ostomy output. Will sign off at this time. Please call for questions or concerns.     Kimberly A Frommel, ROSEP-BC    I have seen and examined the patient with the Physician Assistant and agree with the assessment and plan.    Colostomy is working and abd is soft and flat.   SBO is resolved. Management is per primary.      Reyna Hodge MD     I

## 2024-01-05 VITALS
HEART RATE: 64 BPM | TEMPERATURE: 97.3 F | SYSTOLIC BLOOD PRESSURE: 156 MMHG | RESPIRATION RATE: 15 BRPM | HEIGHT: 68 IN | OXYGEN SATURATION: 99 % | WEIGHT: 103.7 LBS | DIASTOLIC BLOOD PRESSURE: 78 MMHG | BODY MASS INDEX: 15.72 KG/M2

## 2024-01-05 LAB
ANION GAP SERPL CALC-SCNC: 5 MMOL/L (ref 2–11)
BACTERIA SPEC CULT: NORMAL
BACTERIA SPEC CULT: NORMAL
BASOPHILS # BLD: 0 K/UL (ref 0–0.2)
BASOPHILS NFR BLD: 0 % (ref 0–2)
BUN SERPL-MCNC: 19 MG/DL (ref 8–23)
CALCIUM SERPL-MCNC: 8 MG/DL (ref 8.3–10.4)
CHLORIDE SERPL-SCNC: 98 MMOL/L (ref 103–113)
CO2 SERPL-SCNC: 30 MMOL/L (ref 21–32)
CREAT SERPL-MCNC: 0.8 MG/DL (ref 0.8–1.5)
DIFFERENTIAL METHOD BLD: ABNORMAL
EOSINOPHIL # BLD: 0.1 K/UL (ref 0–0.8)
EOSINOPHIL NFR BLD: 2 % (ref 0.5–7.8)
ERYTHROCYTE [DISTWIDTH] IN BLOOD BY AUTOMATED COUNT: 12.4 % (ref 11.9–14.6)
GLUCOSE SERPL-MCNC: 94 MG/DL (ref 65–100)
HCT VFR BLD AUTO: 31.8 % (ref 41.1–50.3)
HGB BLD-MCNC: 10.3 G/DL (ref 13.6–17.2)
IMM GRANULOCYTES # BLD AUTO: 0 K/UL (ref 0–0.5)
IMM GRANULOCYTES NFR BLD AUTO: 0 % (ref 0–5)
LYMPHOCYTES # BLD: 0.4 K/UL (ref 0.5–4.6)
LYMPHOCYTES NFR BLD: 7 % (ref 13–44)
MCH RBC QN AUTO: 28.2 PG (ref 26.1–32.9)
MCHC RBC AUTO-ENTMCNC: 32.4 G/DL (ref 31.4–35)
MCV RBC AUTO: 87.1 FL (ref 82–102)
MM INDURATION, POC: 0.05 MM (ref 0–5)
MONOCYTES # BLD: 0.6 K/UL (ref 0.1–1.3)
MONOCYTES NFR BLD: 9 % (ref 4–12)
NEUTS SEG # BLD: 4.9 K/UL (ref 1.7–8.2)
NEUTS SEG NFR BLD: 82 % (ref 43–78)
NRBC # BLD: 0 K/UL (ref 0–0.2)
PLATELET # BLD AUTO: 316 K/UL (ref 150–450)
PMV BLD AUTO: 8.5 FL (ref 9.4–12.3)
POTASSIUM SERPL-SCNC: 4.5 MMOL/L (ref 3.5–5.1)
PPD, POC: NEGATIVE
RBC # BLD AUTO: 3.65 M/UL (ref 4.23–5.6)
SERVICE CMNT-IMP: NORMAL
SERVICE CMNT-IMP: NORMAL
SODIUM SERPL-SCNC: 133 MMOL/L (ref 136–146)
WBC # BLD AUTO: 5.9 K/UL (ref 4.3–11.1)

## 2024-01-05 PROCEDURE — 6370000000 HC RX 637 (ALT 250 FOR IP): Performed by: FAMILY MEDICINE

## 2024-01-05 PROCEDURE — 2580000003 HC RX 258: Performed by: SURGERY

## 2024-01-05 PROCEDURE — 2580000003 HC RX 258: Performed by: INTERNAL MEDICINE

## 2024-01-05 PROCEDURE — 36415 COLL VENOUS BLD VENIPUNCTURE: CPT

## 2024-01-05 PROCEDURE — 6360000002 HC RX W HCPCS: Performed by: SURGERY

## 2024-01-05 PROCEDURE — 80048 BASIC METABOLIC PNL TOTAL CA: CPT

## 2024-01-05 PROCEDURE — 6370000000 HC RX 637 (ALT 250 FOR IP): Performed by: INTERNAL MEDICINE

## 2024-01-05 PROCEDURE — 85025 COMPLETE CBC W/AUTO DIFF WBC: CPT

## 2024-01-05 RX ORDER — QUETIAPINE FUMARATE 25 MG/1
25 TABLET, FILM COATED ORAL 2 TIMES DAILY PRN
Status: DISCONTINUED | OUTPATIENT
Start: 2024-01-05 | End: 2024-01-05 | Stop reason: HOSPADM

## 2024-01-05 RX ADMIN — QUETIAPINE FUMARATE 25 MG: 25 TABLET ORAL at 01:29

## 2024-01-05 RX ADMIN — PIPERACILLIN AND TAZOBACTAM 3375 MG: 3; .375 INJECTION, POWDER, FOR SOLUTION INTRAVENOUS at 07:59

## 2024-01-05 RX ADMIN — SODIUM CHLORIDE, POTASSIUM CHLORIDE, SODIUM LACTATE AND CALCIUM CHLORIDE: 600; 310; 30; 20 INJECTION, SOLUTION INTRAVENOUS at 01:32

## 2024-01-05 RX ADMIN — PIPERACILLIN AND TAZOBACTAM 3375 MG: 3; .375 INJECTION, POWDER, FOR SOLUTION INTRAVENOUS at 00:13

## 2024-01-05 RX ADMIN — CARVEDILOL 6.25 MG: 6.25 TABLET, FILM COATED ORAL at 07:47

## 2024-01-05 RX ADMIN — SODIUM CHLORIDE, PRESERVATIVE FREE 10 ML: 5 INJECTION INTRAVENOUS at 07:49

## 2024-01-05 RX ADMIN — HYDROXYZINE HYDROCHLORIDE 50 MG: 25 TABLET, FILM COATED ORAL at 07:47

## 2024-01-05 RX ADMIN — POTASSIUM BICARBONATE 40 MEQ: 782 TABLET, EFFERVESCENT ORAL at 00:18

## 2024-01-05 RX ADMIN — LEVOTHYROXINE SODIUM 125 MCG: 0.12 TABLET ORAL at 06:23

## 2024-01-05 RX ADMIN — OXYBUTYNIN CHLORIDE 10 MG: 10 TABLET, EXTENDED RELEASE ORAL at 07:47

## 2024-01-05 NOTE — CARE COORDINATION
Transport packet complete (2H&Ps, 2 facesheets, PASSAR, Physician certification, PPD results, COVID results, discharge summary, PT/OT notes). PASSAR copied and scanned to medical records.

## 2024-01-05 NOTE — DISCHARGE SUMMARY
Hospitalist Discharge Summary   Admit Date:  2023 11:03 AM   DC Note date: 2024  Name:  Peter Nolasco   Age:  78 y.o.  Sex:  male  :  1945   MRN:  [id number]   Room:  [facility]/  PCP:  Bucky Armenta DO    Presenting Complaint: Emesis     Initial Admission Diagnosis: Dehydration [E86.0]  Small bowel obstruction (HCC) [K56.609]  Small bowel obstruction due to adhesions (HCC) [K56.50]  Urinary tract infection associated with indwelling urethral catheter, initial encounter (HCC) [T83.511A, N39.0]  Nausea and vomiting, unspecified vomiting type [R11.2]     Problem List for this Hospitalization (present on admission):    Principal Problem:    Small bowel obstruction (HCC)  Active Problems:    Mild episode of recurrent major depressive disorder (HCC)    Bladder injury with open wound into cavity    Severe protein-calorie malnutrition (HCC)    Urinary retention    Chronic indwelling John catheter    Urinary tract infection associated with indwelling urethral catheter (HCC)    Difficulty in walking    Bipolar disorder, unspecified (HCC)    Primary hypertension    Acquired hypothyroidism    KEVAN (acute kidney injury) (HCC)    Abdominal pain    Nausea and vomiting    RSV infection  Resolved Problems:    * No resolved hospital problems. *      Hospital Course:  Mr. Nolasco is a nice 75 y/o WM with a h/o chronic urinary retention with chronic indwelling John s/p cystoscopy with right ureteral stent exchange and John catheter exchanged on , h/o rectal cancer s/p colostomy, bipolar disorder, and hypothyroidism who was admitted to our service on  with SBO. He presented to the ER from [facility] with abdominal pain and N/V. CT a/p showed SBO, NGT placed. CT also noted possible posterior fluid near the urinary bladder concerning for chronic fistula or contained perforation. He was admitted and started on IVFs, Surgery and Urology consulted.      Patient was admitted to the floor and was decompressed with NG  SPECIAL REQUESTS  Reflexed from [id number]       Culture       >100,000 COLONIES/mL MIXED SKIN SHERRY ISOLATED                  THREE OR MORE TYPES OF ORGANISMS ARE PRESENT.  THIS IS INDICATIVE OF CONTAMINATION DUE TO IMPROPER COLLECTION TECHNIQUE.  PLEASE REPEAT COLLECTION UNLESS PATIENT HAS STARTED ANTIBIOTIC TREATMENT.          Culture, Urine [[id number]] Collected: 12/31/23 1145    Order Status: Canceled     Culture, Urine [[id number]]     Order Status: No result Specimen: Urine, clean catch             All Labs from Last 24 Hrs:  Recent Results (from the past 24 hour(s))   Basic Metabolic Panel w/ Reflex to MG    Collection Time: 01/05/24  4:01 AM   Result Value Ref Range    Sodium 133 (L) 136 - 146 mmol/L    Potassium 4.5 3.5 - 5.1 mmol/L    Chloride 98 (L) 103 - 113 mmol/L    CO2 30 21 - 32 mmol/L    Anion Gap 5 2 - 11 mmol/L    Glucose 94 65 - 100 mg/dL    BUN 19 8 - 23 MG/DL    Creatinine 0.80 0.8 - 1.5 MG/DL    Est, Glom Filt Rate >60 >60 ml/min/1.73m2    Calcium 8.0 (L) 8.3 - 10.4 MG/DL   CBC with Auto Differential    Collection Time: 01/05/24  4:01 AM   Result Value Ref Range    WBC 5.9 4.3 - 11.1 K/uL    RBC 3.65 (L) 4.23 - 5.6 M/uL    Hemoglobin 10.3 (L) 13.6 - 17.2 g/dL    Hematocrit 31.8 (L) 41.1 - 50.3 %    MCV 87.1 82 - 102 FL    MCH 28.2 26.1 - 32.9 PG    MCHC 32.4 31.4 - 35.0 g/dL    RDW 12.4 11.9 - 14.6 %    Platelets 316 150 - 450 K/uL    MPV 8.5 (L) 9.4 - 12.3 FL    nRBC 0.00 0.0 - 0.2 K/uL    Differential Type AUTOMATED      Neutrophils % 82 (H) 43 - 78 %    Lymphocytes % 7 (L) 13 - 44 %    Monocytes % 9 4.0 - 12.0 %    Eosinophils % 2 0.5 - 7.8 %    Basophils % 0 0.0 - 2.0 %    Immature Granulocytes 0 0.0 - 5.0 %    Neutrophils Absolute 4.9 1.7 - 8.2 K/UL    Lymphocytes Absolute 0.4 (L) 0.5 - 4.6 K/UL    Monocytes Absolute 0.6 0.1 - 1.3 K/UL    Eosinophils Absolute 0.1 0.0 - 0.8 K/UL    Basophils Absolute 0.0 0.0 - 0.2 K/UL    Absolute Immature Granulocyte 0.0 0.0 - 0.5 K/UL       No Known  Allergies  Immunization History   Administered Date(s) Administered    COVID-19, PFIZER PURPLE top, DILUTE for use, (age 12 y+), 30mcg/0.3mL 02/15/2021, 03/08/2021, 10/25/2021    PPD Test 01/28/2014, 08/24/2014, 09/09/2014, 11/19/2015, 05/23/2016, 03/25/2019, 04/23/2019, 05/04/2019, 05/23/2019, 09/24/2021, 09/27/2021, 02/24/2022, 06/27/2022, 10/17/2022, 12/07/2022, 07/13/2023, 11/30/2023       Recent Vital Data:  Patient Vitals for the past 24 hrs:   Temp Pulse Resp BP SpO2   01/05/24 0714 97.3 °F (36.3 °C) 64 15 (!) 156/78 99 %   01/05/24 0414 97.5 °F (36.4 °C) 57 17 (!) 153/79 100 %   01/05/24 0035 97.4 °F (36.3 °C) 57 18 (!) 167/87 99 %   01/04/24 1952 97.7 °F (36.5 °C) 65 17 (!) 146/96 98 %   01/04/24 1749 -- 69 -- (!) 146/92 --   01/04/24 1610 97.4 °F (36.3 °C) 69 20 (!) 146/92 100 %   01/04/24 1558 -- 65 -- (!) 160/144 --   01/04/24 1135 97.6 °F (36.4 °C) 76 20 (!) 156/82 100 %       Oxygen Therapy  SpO2: 99 %  Pulse via Oximetry: 79 beats per minute  O2 Device: Nasal cannula  O2 Flow Rate (L/min): 3 L/min    Estimated body mass index is 15.77 kg/m² as calculated from the following:    Height as of this encounter: 1.727 m (5' 8\").    Weight as of this encounter: 47 kg (103 lb 11.2 oz).    Intake/Output Summary (Last 24 hours) at 1/5/2024 1059  Last data filed at 1/5/2024 0805  Gross per 24 hour   Intake 49347.68 ml   Output 3125 ml   Net 36669.68 ml         Physical Exam:  General:          Cachectic, no acute distress.  Head:               Normocephalic, atraumatic  Eyes:               Sclerae appear normal.  Pupils equally round.  ENT:                Nares appear normal.  Moist oral mucosa  Neck:               No restricted ROM.  Trachea midline   CV:                  RRR.  No m/r/g.  No jugular venous distension.  Lungs:             CTAB.  No wheezing, rhonchi, or rales.  Symmetric expansion.  Abdomen:        Soft, mildly tender to epigastric area, nondistended.  Bowel sounds in all 4 quadrants.  Ostomy

## 2024-01-05 NOTE — PROGRESS NOTES
END OF SHIFT NOTE:    INTAKE/OUTPUT  01/04 0701 - 01/05 0700  In: 45828.7 [P.O.:860; I.V.:50124.1]  Out: 3375 [Urine:3025]  Voiding: No  Catheter: Yes  Drain:   Colostomy LLQ (Active)   Stomal Appliance 1 piece 01/05/24 0013   Stoma  Assessment Retracted;Red;Moist 01/05/24 0013   Peristomal Assessment Clean, dry & intact;Pink 01/05/24 0013   Treatment Site care;Other (Comment) 01/05/24 0013   Stool Appearance Loose 01/05/24 0013   Stool Color Brown 01/05/24 0013   Stool Amount Large 01/05/24 0013   Output (mL) 200 ml 01/04/24 2200       Urinary Catheter 01/01/24 (Active)   Catheter Indications Urinary retention (acute or chronic), continuous bladder irrigation or bladder outlet obstruction 01/04/24 1955   Site Assessment No urethral drainage 01/04/24 1955   Urine Color Yellow 01/05/24 0414   Urine Appearance Clear 01/05/24 0414   Collection Container Standard 01/04/24 1955   Securement Method Leg strap 01/04/24 1955   Catheter Care  Perineal wipes 01/05/24 0035   Catheter Best Practices  Drainage tube clipped to bed;Catheter secured to thigh;Tamper seal intact;Bag below bladder;Bag not on floor;Lack of dependent loop in tubing;Drainage bag less than half full 01/04/24 1955   Status Draining;Patent 01/04/24 1955   Output (mL) 400 mL 01/05/24 0414   Discontinuation Reason Per provider order 01/04/24 1955               Flatus: Patient does have flatus present.    Stool: 2 occurrences.  Large occurrence with bag bursting and needed changing.   Characteristics:  Stool Appearance: Watery, Loose  Stool Color: Brown     Stool Assessment  Stool Appearance: Watery, Loose  Stool Color: Brown  Stool Source: Colostomy  Last BM (including prior to admit): 01/05/24    Emesis: 0 occurrences.    Characteristics:        VITAL SIGNS  Patient Vitals for the past 12 hrs:   Temp Pulse Resp BP SpO2   01/05/24 0714 97.3 °F (36.3 °C) 64 15 (!) 156/78 99 %   01/05/24 0414 97.5 °F (36.4 °C) 57 17 (!) 153/79 100 %   01/05/24 0035 97.4 °F (36.3  °C) 57 18 (!) 167/87 99 %   01/04/24 1952 97.7 °F (36.5 °C) 65 17 (!) 146/96 98 %       Pain Assessment  Pain Level: 0 (01/03/24 1915)  Pain Location: Abdomen  Patient's Stated Pain Goal: 0 - No pain    Ambulating  YES, with therapy only.  PT/OT following.    Shift report given to oncoming nurse at the bedside.    Aida Encinas RN

## 2024-01-05 NOTE — CARE COORDINATION
Patient has discharge orders in on this day. Patient's discharge plan is to go to [facility]. Patient is aware and in agreement with this discharge today. CM called patient's emergency contact, Manju Guillermo, as requested by patient and informed her patient is going to [facility] today. She is also in agreement.     CM spoke to Angelina from [facility] who confirmed patient can come today.   Room: [facility]  Report: [phone]    Transport arranged for 1:30P.M. Interdisciplinary team made aware.     No CM needs voiced or noted at this time.     ASSESSMENT NOTE    Attending Physician: Shawn Fernandez MD  Admit Problem: Dehydration [E86.0]  Small bowel obstruction ([facility]) [K56.609]  Small bowel obstruction due to adhesions ([facility]) [K56.50]  Urinary tract infection associated with indwelling urethral catheter, initial encounter ([facility]) [T83.511A, N39.0]  Nausea and vomiting, unspecified vomiting type [R11.2]  Date/Time of Admission: 12/31/2023 11:03 AM  Problem List:  Patient Active Problem List   Diagnosis    Fecal incontinence    Hypoxemia    Weight loss    Rectal cancer ([facility])    Bladder injury with open wound into cavity    Hypercalcemia of malignancy    Attention to ileostomy ([facility])    Acute blood loss anemia    Anemia    Abscess    Hypernatremia    Decreased independence with activities of daily living    Enterocutaneous fistula    Hypothyroidism    H/O urinary retention    Severe protein-calorie malnutrition ([facility])    Elevated LFTs    Urinary retention    Hydronephrosis of right kidney    C. difficile colitis    Anxiety    Pancreatic duct dilated    Hypomagnesemia    Rectal fistula    Hematuria    Major depression    Colostomy care ([facility])    Chronic indwelling John catheter    Acute deep vein thrombosis (DVT) of non-extremity vein    Hypokalemia    Postoperative ileus ([facility])    Bowel incontinence    Constipation    Urinary tract infection associated with indwelling urethral catheter ([facility])    Acute kidney injury superimposed on CKD ([facility])    Detailed Notice of Discharge given to:     Date Notice of Discharge given:     Time Notice of Discharge given:     Date records sent to [facility]     Time records sent to [facility]     Date Notified of Outcome     Time Notified of Outcome     Outcome of appeal           Ita Cooney 01/05/24 11:23 AM

## 2024-01-05 NOTE — PROGRESS NOTES
Patient off restraints since beginning of shift. Patient calm and congruent, alert and oriented to person and place, needing reorientation with time and situation. Sitter at bedside until 2200. Patient awake, using call light to report needs. Has requested seroquel to help him sleep, which is in his med list from home. Dr Alvarado, hospitalist notified and orders received.

## 2024-01-08 ENCOUNTER — CARE COORDINATION (OUTPATIENT)
Dept: CARE COORDINATION | Facility: CLINIC | Age: 79
End: 2024-01-08

## 2024-01-08 NOTE — CARE COORDINATION
Transition of care outreach postponed for 14 days due to patient's discharge to [facility] for short term rehab.   
Street

## 2024-01-11 ENCOUNTER — HOSPITAL ENCOUNTER (EMERGENCY)
Age: 79
Discharge: HOME | End: 2024-01-11
Attending: EMERGENCY MEDICINE
Payer: MEDICARE

## 2024-01-11 ENCOUNTER — APPOINTMENT (OUTPATIENT)
Dept: CT IMAGING | Age: 79
End: 2024-01-11
Payer: MEDICARE

## 2024-01-11 VITALS
SYSTOLIC BLOOD PRESSURE: 143 MMHG | OXYGEN SATURATION: 97 % | BODY MASS INDEX: 15.34 KG/M2 | HEIGHT: 68 IN | DIASTOLIC BLOOD PRESSURE: 82 MMHG | TEMPERATURE: 98.1 F | RESPIRATION RATE: 15 BRPM | WEIGHT: 101.2 LBS | HEART RATE: 81 BPM

## 2024-01-11 DIAGNOSIS — T83.511A URINARY TRACT INFECTION ASSOCIATED WITH INDWELLING URETHRAL CATHETER, INITIAL ENCOUNTER (HCC): Primary | ICD-10-CM

## 2024-01-11 DIAGNOSIS — N39.0 URINARY TRACT INFECTION ASSOCIATED WITH INDWELLING URETHRAL CATHETER, INITIAL ENCOUNTER (HCC): Primary | ICD-10-CM

## 2024-01-11 LAB
ALBUMIN SERPL-MCNC: 2.6 G/DL (ref 3.2–4.6)
ALBUMIN/GLOB SERPL: 0.7 (ref 0.4–1.6)
ALP SERPL-CCNC: 84 U/L (ref 50–136)
ALT SERPL-CCNC: 23 U/L (ref 12–65)
ANION GAP SERPL CALC-SCNC: 5 MMOL/L (ref 2–11)
APPEARANCE UR: ABNORMAL
AST SERPL-CCNC: 16 U/L (ref 15–37)
BACTERIA URNS QL MICRO: ABNORMAL /HPF
BASOPHILS # BLD: 0.1 K/UL (ref 0–0.2)
BASOPHILS NFR BLD: 1 % (ref 0–2)
BILIRUB SERPL-MCNC: 0.2 MG/DL (ref 0.2–1.1)
BILIRUB UR QL: ABNORMAL
BUN SERPL-MCNC: 33 MG/DL (ref 8–23)
CALCIUM SERPL-MCNC: 8.4 MG/DL (ref 8.3–10.4)
CASTS URNS QL MICRO: 0 /LPF
CHLORIDE SERPL-SCNC: 104 MMOL/L (ref 103–113)
CO2 SERPL-SCNC: 27 MMOL/L (ref 21–32)
COLOR UR: ABNORMAL
CREAT SERPL-MCNC: 1.2 MG/DL (ref 0.8–1.5)
CRYSTALS URNS QL MICRO: 0 /LPF
DIFFERENTIAL METHOD BLD: ABNORMAL
EOSINOPHIL # BLD: 0.2 K/UL (ref 0–0.8)
EOSINOPHIL NFR BLD: 2 % (ref 0.5–7.8)
EPI CELLS #/AREA URNS HPF: ABNORMAL /HPF
ERYTHROCYTE [DISTWIDTH] IN BLOOD BY AUTOMATED COUNT: 13.2 % (ref 11.9–14.6)
GLOBULIN SER CALC-MCNC: 3.7 G/DL (ref 2.8–4.5)
GLUCOSE SERPL-MCNC: 94 MG/DL (ref 65–100)
GLUCOSE UR STRIP.AUTO-MCNC: 100 MG/DL
HCT VFR BLD AUTO: 30.7 % (ref 41.1–50.3)
HGB BLD-MCNC: 9.8 G/DL (ref 13.6–17.2)
HGB UR QL STRIP: ABNORMAL
IMM GRANULOCYTES # BLD AUTO: 0 K/UL (ref 0–0.5)
IMM GRANULOCYTES NFR BLD AUTO: 0 % (ref 0–5)
KETONES UR QL STRIP.AUTO: 15 MG/DL
LEUKOCYTE ESTERASE UR QL STRIP.AUTO: ABNORMAL
LYMPHOCYTES # BLD: 0.5 K/UL (ref 0.5–4.6)
LYMPHOCYTES NFR BLD: 7 % (ref 13–44)
MCH RBC QN AUTO: 27.9 PG (ref 26.1–32.9)
MCHC RBC AUTO-ENTMCNC: 31.9 G/DL (ref 31.4–35)
MCV RBC AUTO: 87.5 FL (ref 82–102)
MONOCYTES # BLD: 0.9 K/UL (ref 0.1–1.3)
MONOCYTES NFR BLD: 11 % (ref 4–12)
MUCOUS THREADS URNS QL MICRO: 0 /LPF
NEUTS SEG # BLD: 6.1 K/UL (ref 1.7–8.2)
NEUTS SEG NFR BLD: 79 % (ref 43–78)
NITRITE UR QL STRIP.AUTO: POSITIVE
NRBC # BLD: 0 K/UL (ref 0–0.2)
OTHER OBSERVATIONS: ABNORMAL
PH UR STRIP: 6.5 (ref 5–9)
PLATELET # BLD AUTO: 324 K/UL (ref 150–450)
PMV BLD AUTO: 8.5 FL (ref 9.4–12.3)
POTASSIUM SERPL-SCNC: 4 MMOL/L (ref 3.5–5.1)
PROT SERPL-MCNC: 6.3 G/DL (ref 6.3–8.2)
PROT UR STRIP-MCNC: >300 MG/DL
RBC # BLD AUTO: 3.51 M/UL (ref 4.23–5.6)
RBC #/AREA URNS HPF: >100 /HPF
SODIUM SERPL-SCNC: 136 MMOL/L (ref 136–146)
SP GR UR REFRACTOMETRY: 1.02 (ref 1–1.02)
URINE CULTURE IF INDICATED: ABNORMAL
UROBILINOGEN UR QL STRIP.AUTO: 2 EU/DL (ref 0.2–1)
WBC # BLD AUTO: 7.7 K/UL (ref 4.3–11.1)
WBC URNS QL MICRO: >100 /HPF

## 2024-01-11 PROCEDURE — 87086 URINE CULTURE/COLONY COUNT: CPT

## 2024-01-11 PROCEDURE — 85025 COMPLETE CBC W/AUTO DIFF WBC: CPT

## 2024-01-11 PROCEDURE — 80053 COMPREHEN METABOLIC PANEL: CPT

## 2024-01-11 PROCEDURE — 74176 CT ABD & PELVIS W/O CONTRAST: CPT

## 2024-01-11 PROCEDURE — 2580000003 HC RX 258: Performed by: EMERGENCY MEDICINE

## 2024-01-11 PROCEDURE — 99284 EMERGENCY DEPT VISIT MOD MDM: CPT

## 2024-01-11 PROCEDURE — 81001 URINALYSIS AUTO W/SCOPE: CPT

## 2024-01-11 RX ORDER — CEFUROXIME AXETIL 500 MG/1
500 TABLET ORAL 2 TIMES DAILY
Qty: 14 TABLET | Refills: 0 | Status: SHIPPED | OUTPATIENT
Start: 2024-01-11 | End: 2024-01-18

## 2024-01-11 RX ORDER — 0.9 % SODIUM CHLORIDE 0.9 %
500 INTRAVENOUS SOLUTION INTRAVENOUS
Status: COMPLETED | OUTPATIENT
Start: 2024-01-11 | End: 2024-01-11

## 2024-01-11 RX ADMIN — SODIUM CHLORIDE 500 ML: 9 INJECTION, SOLUTION INTRAVENOUS at 15:40

## 2024-01-11 ASSESSMENT — PAIN SCALES - GENERAL: PAINLEVEL_OUTOF10: 0

## 2024-01-11 ASSESSMENT — ENCOUNTER SYMPTOMS
BACK PAIN: 0
COLOR CHANGE: 0
DIARRHEA: 0
ABDOMINAL PAIN: 0
NAUSEA: 0
COUGH: 0
CONSTIPATION: 0
SHORTNESS OF BREATH: 0
VOMITING: 0

## 2024-01-11 ASSESSMENT — PAIN - FUNCTIONAL ASSESSMENT: PAIN_FUNCTIONAL_ASSESSMENT: 0-10

## 2024-01-11 NOTE — ED PROVIDER NOTES
Emergency Department Provider Note       PCP: uBcky Armenta DO   Age: 78 y.o.   Sex: male     DISPOSITION Decision To Discharge 01/11/2024 08:34:39 PM       ICD-10-CM    1. Urinary tract infection associated with indwelling urethral catheter, initial encounter ([facility])  T83.511A     N39.0           Medical Decision Making     Complexity of Problems Addressed:  1 or more acute illnesses that pose a threat to life or bodily function.     Data Reviewed and Analyzed:   I independently ordered and reviewed each unique test.  I reviewed external records: provider visit note from outside specialist.   The patients assessment required an independent historian: ems.  The reason they were needed is important historical information not provided by the patient.    I independently ordered and interpreted the ED       I interpreted the CT Scan no free air.    Discussion of management or test interpretation.  Patient with hematuria and John catheter in place.  CT shows ureteral stent in place no change.  Patient with UTI.  Will start antibiotics and discharge with PCP follow-up      Risk of Complications and/or Morbidity of Patient Management:  Prescription drug management performed.  Patient was discharged risks and benefits of hospitalization were considered.  Shared medical decision making was utilized in creating the patients health plan today.         Is this patient to be included in the SEP-1 core measure due to severe sepsis or septic shock? No Exclusion criteria - the patient is NOT to be included for SEP-1 Core Measure due to: Infection is not suspected      History      Patient presents from a rehab facility with chronic indwelling John catheter.  Having some hematuria.  Noticed by the nurse practitioner and sent here for further evaluation.  Patient states he has had hematuria in the past.  Not having any abdominal pain.  No nausea or vomiting.  No diarrhea or constipation.  Feels at baseline currently.    The

## 2024-01-11 NOTE — ED TRIAGE NOTES
Pt arrives via [facility] from [facility] with CO hematuria noted by staff today. Pt at facility for rehab. Pt denies trauma. Hx urinary stent on 12/23.

## 2024-01-12 NOTE — ED NOTES
I have reviewed discharge instructions with the patient.  The patient verbalized understanding.    Patient left ED via Discharge Method: stretcher to SNF with self and EMS transport.    Opportunity for questions and clarification provided.       Patient given 1 scripts.         To continue your aftercare when you leave the hospital, you may receive an automated call from our care team to check in on how you are doing.  This is a free service and part of our promise to provide the best care and service to meet your aftercare needs.” If you have questions, or wish to unsubscribe from this service please call [phone].  Thank you for Choosing our [facility] Emergency Department.        Velvet Sebastian RN  01/11/24 3430

## 2024-01-13 LAB
BACTERIA SPEC CULT: NORMAL
BACTERIA SPEC CULT: NORMAL
SERVICE CMNT-IMP: NORMAL

## 2024-01-17 NOTE — PROGRESS NOTES
Physician Progress Note      PATIENT:               DINA MATTHEWS  CSN #:                  [id number]  :                       1945  ADMIT DATE:       2023 11:03 AM  DISCH DATE:        2024 1:50 PM  RESPONDING  PROVIDER #:        Shawn Fernandez MD          QUERY TEXT:    Patient admitted with SBO. Noted documentation of severe protein-calorie   malnutrition. In order to support the diagnosis of severe protein-calorie   malnutrition, please include additional clinical indicators in your   documentation.  Or please document if the diagnosis of severe protein-calorie   malnutrition has been ruled out after further study.    The medical record reflects the following:  Risk Factors: chronic urinary retention with chronic indwelling John s/p   cystoscopy with right ureteral stent exchange and John catheter exchanged on   , h/o rectal cancer s/p colostomy, bipolar disorder, and hypothyroidism  Clinical Indicators: Malnutrition Assessment: Malnutrition Status:   Insufficient data (No diet hx or NFPE. Unable to validate weight changes,   Prior NFPE 2023 c/w severe fat and muscle loss.)  NFPE: deferred d/t respiratory isolation  Nutrition Diagnosis:?Inadequate oral intake related to altered GI function as   evidenced by  (NPO/ CLD x 1 day, N/V, abdominal pain x 1 day PTA. current   intake meeting of FLD 1-25% of estimated needs)  Treatment: ADULT DIET; Full Liquid  Options provided:  -- severe protein-calorie malnutrition present as evidenced by, Please   document evidence.  -- severe protein-calorie malnutrition was ruled out  -- Other - I will add my own diagnosis  -- Disagree - Not applicable / Not valid  -- Disagree - Clinically unable to determine / Unknown  -- Refer to Clinical Documentation Reviewer    PROVIDER RESPONSE TEXT:    severe protein-calorie malnutrition is present as evidenced by as evidenced by   severe fat and muscle loss    Query created by: Lakisha Sears on 1/10/2024 1:07  PM      QUERY TEXT:    Patient admitted with SBO. Noted documentation of CAUTI. In order to support   the diagnosis of CAUTI, please include additional clinical indicators in your   documentation.  Or please document if the diagnosis of CAUTI has been ruled   out after further study.    The medical record reflects the following:  Risk Factors: chronic urinary retention with chronic indwelling Carr s/p   cystoscopy with right ureteral stent exchange and Carr catheter exchanged on   12/21  Clinical Indicators: Urine Cx >100,000 COLONIES/mL MIXED SKIN SHERRY ISOLATED  Treatment: Con't Zosyn given h/o PsA, cultures  Options provided:  -- CAUTI present as evidenced by, Please document evidence.  -- CAUTI was ruled out  -- Other - I will add my own diagnosis  -- Disagree - Not applicable / Not valid  -- Disagree - Clinically unable to determine / Unknown  -- Refer to Clinical Documentation Reviewer    PROVIDER RESPONSE TEXT:    CAUTI is present as evidenced by signs/sx of UTI with chronic indwelling   carr.    Query created by: Lakisha Sears on 1/10/2024 1:10 PM      Electronically signed by:  Shawn Fernandez MD 1/17/2024 12:15 PM

## 2024-01-22 ENCOUNTER — CARE COORDINATION (OUTPATIENT)
Dept: CARE COORDINATION | Facility: CLINIC | Age: 79
End: 2024-01-22

## 2024-01-22 NOTE — CARE COORDINATION
Care Transitions Post-Acute Facility Update Call    2024    Patient: Peter Nolasco Patient : 1945   MRN: [id number]  Reason for Admission: SBO  Discharge Date: 24 RARS: Readmission Risk Score: 23.3       CTN contacted [facility] and left VM for SS to return call for update on tentative d/c dates, if available. CTN to follow up if no reply.  Care Transitions Post Acute Facility Update    Care Transitions Interventions  Disease Specific Clinic: Completed            Post Acute Facility Update   Post Acute Facility: [facility]          Nursing       Rehab/Functional       SW/Discharge Planning

## 2024-01-22 NOTE — CARE COORDINATION
Care Transitions Post-Acute Facility Update Call    2024    Patient: Peter Nolasco Patient : 1945   MRN: [id number]  Reason for Admission: SBO  Discharge Date: 24 RARS: Readmission Risk Score: 23.3     CTN received return call from [facility] Post acute STR> Patient continues in rehab and per SS \"would probably be a few more weeks\" CTN to follow up again in 10-14 days for dates at that time.     Care Transitions Post Acute Facility Update    Care Transitions Interventions      Post Acute Facility Update   Post Acute Facility: [facility]          Nursing       Rehab/Functional       SW/Discharge Planning

## 2024-02-02 ENCOUNTER — CARE COORDINATION (OUTPATIENT)
Dept: CARE COORDINATION | Facility: CLINIC | Age: 79
End: 2024-02-02

## 2024-02-02 NOTE — CARE COORDINATION
Care Transitions Post-Acute Facility Update Call    2024    Patient: Peter Nolasco Patient : 1945   MRN: [id number]  Reason for Admission: SBO  Discharge Date: 24 RARS: Readmission Risk Score: 23.3    This Care Coordinator contacted [facility], and left voice message for update and estimated discharge , if available.CTN to follow up if no reply.    Care Transitions Post Acute Facility Update    Care Transitions Interventions      Post Acute Facility Update   Post Acute Facility: [facility]          Nursing       Rehab/Functional       SW/Discharge Planning

## 2024-02-07 ENCOUNTER — CARE COORDINATION (OUTPATIENT)
Dept: CARE COORDINATION | Facility: CLINIC | Age: 79
End: 2024-02-07

## 2024-02-07 NOTE — CARE COORDINATION
Care Transitions Post-Acute Facility Update Call    2024    Patient: Peter Nolasco Patient : 1945   MRN: [id number]  Reason for Admission: SBO  Discharge Date: 24 RARS: Readmission Risk Score: 23.3     This Care Coordinator contacted [facility], and left voice message for update and estimated discharge , if available. Patient > 30 days since d/c from  facility.    Care Transitions Post Acute Facility Update    Care Transitions Interventions  Disease Specific Clinic: Completed            Post Acute Facility Update   Post Acute Facility: [facility]          Nursing       Rehab/Functional       SW/Discharge Planning

## 2024-02-08 ENCOUNTER — CARE COORDINATION (OUTPATIENT)
Dept: CARE COORDINATION | Facility: CLINIC | Age: 79
End: 2024-02-08

## 2024-02-09 ENCOUNTER — CARE COORDINATION (OUTPATIENT)
Dept: CARE COORDINATION | Facility: CLINIC | Age: 79
End: 2024-02-09

## 2024-02-14 ENCOUNTER — TELEPHONE (OUTPATIENT)
Dept: UROLOGY | Age: 79
End: 2024-02-14

## 2024-02-16 ENCOUNTER — TELEPHONE (OUTPATIENT)
Dept: INTERNAL MEDICINE CLINIC | Facility: CLINIC | Age: 79
End: 2024-02-16

## 2024-02-16 ENCOUNTER — TELEPHONE (OUTPATIENT)
Dept: UROLOGY | Age: 79
End: 2024-02-16

## 2024-02-16 NOTE — TELEPHONE ENCOUNTER
Physical Therapy called to inform doctor that patient got home from rehab yesterday and fell. Just small skin tear to knee.

## 2024-02-16 NOTE — TELEPHONE ENCOUNTER
Natasha with [facility] called stated they are requesting orders to be able to change catheter PRN over the weekend. If pt was to have any issues with catheter. Called [facility] and gave verbal orders for prn weekend catheter changes.

## 2024-02-19 ENCOUNTER — TELEPHONE (OUTPATIENT)
Dept: UROLOGY | Age: 79
End: 2024-02-19

## 2024-02-19 NOTE — TELEPHONE ENCOUNTER
Cristina with [facility]  called stated pt called them informed them catheter has not drained in the past 3 hours and started to have back pain. Called Meredith back informed her since [facility]  was the only services pt was using that they could handle monthly catheter changes. Informed Cristina I was told they were only going out on the weekends and need orders prn. Cristina stated she would change plan of care and have pt catheter changed monthly and would have a nurse go out to pt's house today and check catheter/ change if needed.

## 2024-02-27 ENCOUNTER — TELEPHONE (OUTPATIENT)
Dept: INTERNAL MEDICINE CLINIC | Facility: CLINIC | Age: 79
End: 2024-02-27

## 2024-02-27 DIAGNOSIS — N32.89 BLADDER SPASMS: ICD-10-CM

## 2024-02-27 RX ORDER — OXYBUTYNIN CHLORIDE 10 MG/1
10 TABLET, EXTENDED RELEASE ORAL DAILY
Qty: 90 TABLET | Refills: 3 | Status: SHIPPED | OUTPATIENT
Start: 2024-02-27

## 2024-03-05 ENCOUNTER — TELEPHONE (OUTPATIENT)
Dept: INTERNAL MEDICINE CLINIC | Facility: CLINIC | Age: 79
End: 2024-03-05

## 2024-03-05 ENCOUNTER — TELEPHONE (OUTPATIENT)
Dept: UROLOGY | Age: 79
End: 2024-03-05

## 2024-03-05 DIAGNOSIS — F33.1 MODERATE EPISODE OF RECURRENT MAJOR DEPRESSIVE DISORDER (HCC): ICD-10-CM

## 2024-03-05 DIAGNOSIS — R63.0 APPETITE LOSS: ICD-10-CM

## 2024-03-05 DIAGNOSIS — F32.A DEPRESSION, UNSPECIFIED DEPRESSION TYPE: ICD-10-CM

## 2024-03-05 DIAGNOSIS — D64.9 ANEMIA, UNSPECIFIED TYPE: ICD-10-CM

## 2024-03-05 DIAGNOSIS — E55.9 VITAMIN D INSUFFICIENCY: ICD-10-CM

## 2024-03-05 DIAGNOSIS — E03.9 HYPOTHYROIDISM, UNSPECIFIED TYPE: ICD-10-CM

## 2024-03-05 RX ORDER — LEVOTHYROXINE SODIUM 0.12 MG/1
TABLET ORAL
Qty: 30 TABLET | Refills: 11 | Status: SHIPPED | OUTPATIENT
Start: 2024-03-05

## 2024-03-05 RX ORDER — MIRTAZAPINE 15 MG/1
15 TABLET, FILM COATED ORAL NIGHTLY
Qty: 90 TABLET | Refills: 3 | Status: SHIPPED | OUTPATIENT
Start: 2024-03-05

## 2024-03-05 NOTE — TELEPHONE ENCOUNTER
----- Message from Natasha Lynne sent at 3/5/2024 11:24 AM EST -----  Subject: Message to Provider    QUESTIONS  Information for Provider? PT states he needs refills on mitazapine (i   don't see it on med list). The pharmacy told him the office cancelled the   refill   ---------------------------------------------------------------------------  --------------  CALL BACK INFO  [phone]; OK to leave message on voicemail  ---------------------------------------------------------------------------  --------------  SCRIPT ANSWERS  Relationship to Patient? Self

## 2024-03-05 NOTE — TELEPHONE ENCOUNTER
I called the patient states he take Mirtazapine 15 mg for sleep. [facility]ill needs to go to [facility]. Chart states stop taking 9/7/23

## 2024-03-07 ENCOUNTER — TELEPHONE (OUTPATIENT)
Dept: UROLOGY | Age: 79
End: 2024-03-07

## 2024-03-07 DIAGNOSIS — R33.9 URINARY RETENTION: ICD-10-CM

## 2024-03-07 RX ORDER — HYOSCYAMINE SULFATE 0.12 MG/1
1 TABLET SUBLINGUAL EVERY 4 HOURS PRN
Qty: 60 EACH | Refills: 2 | Status: SHIPPED | OUTPATIENT
Start: 2024-03-07

## 2024-03-07 RX ORDER — SOLIFENACIN SUCCINATE 5 MG/1
5 TABLET, FILM COATED ORAL DAILY
Qty: 90 TABLET | Refills: 3 | Status: SHIPPED | OUTPATIENT
Start: 2024-03-07

## 2024-03-07 NOTE — TELEPHONE ENCOUNTER
Pt came to office with complaints of bladder spasms. Pt wanted catheter changed but recently had it changed by  on 02/29/24. We decided to send Levsin to pt's pharmacy. Informed pt he will have spasm because of having stents and a catheter. Pt stated he understood.     Pt called this afternoon. Stated he has been taking Oxybutynin and Levsin everyday. Pt states he still has spasms throughout the night and wakes up with his brief wet.     Per Natasha we could switch oxybutynin for vesicare and scheduled an appt 2 weeks out to see how he is doing and to discuss if botox would be an option if vesicare didn't work.     Called pt [facility] to call back to let me know if this is the route he would like to try.

## 2024-03-07 NOTE — PROGRESS NOTES
Patient in office. His HH RN called but had not gotten back to him with advise. Patient having urine leakage around John cath. Remains on oxybutynin daily. Recommended addition of Levsin q 4-6 hr PRN.     Natasha Yu, GERMAN - CNP

## 2024-03-12 ENCOUNTER — TELEPHONE (OUTPATIENT)
Dept: UROLOGY | Age: 79
End: 2024-03-12

## 2024-03-12 DIAGNOSIS — R33.9 URINARY RETENTION: ICD-10-CM

## 2024-03-12 DIAGNOSIS — N32.89 BLADDER SPASMS: Primary | ICD-10-CM

## 2024-03-12 RX ORDER — HYOSCYAMINE SULFATE 0.12 MG/1
1 TABLET SUBLINGUAL EVERY 4 HOURS
Qty: 120 EACH | Refills: 3 | Status: SHIPPED | OUTPATIENT
Start: 2024-03-12

## 2024-03-12 RX ORDER — OXYBUTYNIN CHLORIDE 10 MG/1
10 TABLET, EXTENDED RELEASE ORAL DAILY
Qty: 90 TABLET | Refills: 3 | Status: SHIPPED | OUTPATIENT
Start: 2024-03-12

## 2024-03-12 NOTE — TELEPHONE ENCOUNTER
Pt' called lvm regarding medication at pharmacy and wanted to make sure ine was oxybutynin. Called pt [facility] for pt to call back because he stopped oxybutynin on 03/07/24 and wanted him to try generic vesicare.

## 2024-03-12 NOTE — TELEPHONE ENCOUNTER
Pt stated since he has been taking Levsin at night it helps with the leakage at light. Pt does not want to try generic vesicare and wants to stay on oxybutynin. Pt states he is taking Levisn josé miguel 4 hours and needs an updated Rx. Please sign and close.

## 2024-03-13 ENCOUNTER — NURSE ONLY (OUTPATIENT)
Dept: INTERNAL MEDICINE CLINIC | Facility: CLINIC | Age: 79
End: 2024-03-13

## 2024-03-13 DIAGNOSIS — R79.89 ELEVATED LFTS: ICD-10-CM

## 2024-03-13 DIAGNOSIS — I10 PRIMARY HYPERTENSION: ICD-10-CM

## 2024-03-13 DIAGNOSIS — D64.9 ANEMIA, UNSPECIFIED TYPE: Primary | ICD-10-CM

## 2024-03-13 DIAGNOSIS — Z00.00 LABORATORY EXAM ORDERED AS PART OF ROUTINE GENERAL MEDICAL EXAMINATION: ICD-10-CM

## 2024-03-13 LAB
ALBUMIN SERPL-MCNC: 3.3 G/DL (ref 3.2–4.6)
ALBUMIN/GLOB SERPL: 0.9 (ref 0.4–1.6)
ALP SERPL-CCNC: 85 U/L (ref 50–136)
ALT SERPL-CCNC: 22 U/L (ref 12–65)
ANION GAP SERPL CALC-SCNC: 9 MMOL/L (ref 2–11)
AST SERPL-CCNC: 24 U/L (ref 15–37)
BILIRUB SERPL-MCNC: 0.4 MG/DL (ref 0.2–1.1)
BUN SERPL-MCNC: 29 MG/DL (ref 8–23)
CALCIUM SERPL-MCNC: 9.4 MG/DL (ref 8.3–10.4)
CHLORIDE SERPL-SCNC: 100 MMOL/L (ref 103–113)
CHOLEST SERPL-MCNC: 178 MG/DL
CO2 SERPL-SCNC: 26 MMOL/L (ref 21–32)
CREAT SERPL-MCNC: 1.4 MG/DL (ref 0.8–1.5)
ERYTHROCYTE [DISTWIDTH] IN BLOOD BY AUTOMATED COUNT: 14.7 % (ref 11.9–14.6)
GLOBULIN SER CALC-MCNC: 3.5 G/DL (ref 2.8–4.5)
GLUCOSE SERPL-MCNC: 86 MG/DL (ref 65–100)
HCT VFR BLD AUTO: 36.4 % (ref 41.1–50.3)
HDLC SERPL-MCNC: 86 MG/DL (ref 40–60)
HDLC SERPL: 2.1
HGB BLD-MCNC: 11.2 G/DL (ref 13.6–17.2)
LDLC SERPL CALC-MCNC: 78.8 MG/DL
MCH RBC QN AUTO: 27.6 PG (ref 26.1–32.9)
MCHC RBC AUTO-ENTMCNC: 30.8 G/DL (ref 31.4–35)
MCV RBC AUTO: 89.7 FL (ref 82–102)
NRBC # BLD: 0 K/UL (ref 0–0.2)
PLATELET # BLD AUTO: 275 K/UL (ref 150–450)
PMV BLD AUTO: 8.8 FL (ref 9.4–12.3)
POTASSIUM SERPL-SCNC: 4.4 MMOL/L (ref 3.5–5.1)
PROT SERPL-MCNC: 6.8 G/DL (ref 6.3–8.2)
RBC # BLD AUTO: 4.06 M/UL (ref 4.23–5.6)
SODIUM SERPL-SCNC: 135 MMOL/L (ref 136–146)
TRIGL SERPL-MCNC: 66 MG/DL (ref 35–150)
VLDLC SERPL CALC-MCNC: 13.2 MG/DL (ref 6–23)
WBC # BLD AUTO: 6.2 K/UL (ref 4.3–11.1)

## 2024-03-20 NOTE — DISCHARGE INSTRUCTIONS
[address]  Department of Interventional Radiology  [facility]  [phone] Office  [phone] Fax    GENERAL DRAIN DISCHARGE INSTRUCTIONS    General Information:     A plastic catheter has been inserted through your skin and into area that needs to be drained. Your doctor ordered this procedure to be done in the event of an abscess, or other fluid collection in your body. You will notice a drainage bag attached to the catheter. When the fluid has all been removed, your doctor will send you back to us for the removal of the catheter. If you are going home with the catheter in place, your doctor may order a home health nurse to come to your house and assist with the care of the catheter. Your doctor will most likely order antibiotic tablets for you to take while you have this tube. Home Care Instructions: You can resume your regular diet and medication regimen. Do not drink alcohol, drive, or make any important legal decisions in the next 24 hours. Do not lift anything heavier than a gallon of milk, or do anything strenuous for the next 24 hours. You should not shower for 24 hours. You should cover the tube with plastic wrap and tape to keep it dry when you shower. You can disconnect the drainage bag while showering. The home health nurse or the nurse who discharges from the hospital will teach you how to do this. Do not take a bath, swim or immerse yourself in water as long as you have this drainage tube. You should clean the tube and change the dressing every  48 hours and as needed. Keep the dressing clean and dry. Keep up with how much drainage you get from the tube and report this to your doctor on each visit. Call If:     You should call your Physician and/or the Radiology Nurse if you have any bleeding other than a small spot on your bandage.  Call if you have any signs of infection, fever, or increased pain at the site of See OT evaluation for all goals and OT POC. Electronically signed by Noreen Fulton OTR/L on 3/20/2024 at 12:44 PM     the tube. Call if you should have leakage around the tube, an increased yellowing of the skin, increased pain in the abdomen, a change in the amount or appearance of the fluid, or if the tube gets pulled out some or all the way out. Follow-Up Instructions: Please see your ordering doctor as he/she has requested. To Reach Us: If you have any questions about your procedure, please call the Interventional Radiology department at [phone]. After business hours (5pm) and weekends, call the answering service at [phone] and ask for the Radiologist on call to be paged. Si tiene Preguntas acerca del procedimiento, por favor llame al departamento de Radiología Intervencional al [phone]. Después de horas de oficina (5 pm) y los fines de [place], llamar al Camilla Stafford al [phone] y pregunte por el Radiologo de Tamra Cabezasrilayo. Interventional Radiology General Nurse Discharge    After general anesthesia or intravenous sedation, for 24 hours or while taking prescription Narcotics:  · Limit your activities  · Do not drive and operate hazardous machinery  · Do not make important personal or business decisions  · Do  not drink alcoholic beverages  · If you have not urinated within 8 hours after discharge, please contact your surgeon on call. * Please give a list of your current medications to your Primary Care Provider. * Please update this list whenever your medications are discontinued, doses are     changed, or new medications (including over-the-counter products) are added. * Please carry medication information at all times in case of emergency situations. These are general instructions for a healthy lifestyle:    No smoking/ No tobacco products/ Avoid exposure to second hand smoke  Surgeon General's Warning:  Quitting smoking now greatly reduces serious risk to your health.     Obesity, smoking, and sedentary lifestyle greatly increases your risk for illness  A healthy diet, regular physical exercise & weight monitoring are important for maintaining a healthy lifestyle    You may be retaining fluid if you have a history of heart failure or if you experience any of the following symptoms:  Weight gain of 3 pounds or more overnight or 5 pounds in a week, increased swelling in our hands or feet or shortness of breath while lying flat in bed. Please call your doctor as soon as you notice any of these symptoms; do not wait until your next office visit. Recognize signs and symptoms of STROKE:  F-face looks uneven    A-arms unable to move or move unevenly    S-speech slurred or non-existent    T-time-call 911 as soon as signs and symptoms begin-DO NOT go       Back to bed or wait to see if you get better-TIME IS BRAIN.     Patient Signature:  Date: 7/8/2021  Discharging Nurse: Jasmyn Almanzar RN

## 2024-03-28 ENCOUNTER — OFFICE VISIT (OUTPATIENT)
Dept: INTERNAL MEDICINE CLINIC | Facility: CLINIC | Age: 79
End: 2024-03-28

## 2024-03-28 VITALS
DIASTOLIC BLOOD PRESSURE: 50 MMHG | OXYGEN SATURATION: 91 % | TEMPERATURE: 98.9 F | SYSTOLIC BLOOD PRESSURE: 100 MMHG | HEIGHT: 68 IN | BODY MASS INDEX: 17.28 KG/M2 | WEIGHT: 114 LBS | HEART RATE: 100 BPM

## 2024-03-28 DIAGNOSIS — F31.9 BIPOLAR AFFECTIVE DISORDER, REMISSION STATUS UNSPECIFIED (HCC): ICD-10-CM

## 2024-03-28 DIAGNOSIS — E43 SEVERE PROTEIN-CALORIE MALNUTRITION (HCC): ICD-10-CM

## 2024-03-28 DIAGNOSIS — I10 PRIMARY HYPERTENSION: Primary | ICD-10-CM

## 2024-03-28 DIAGNOSIS — Z43.2 ATTENTION TO ILEOSTOMY (HCC): ICD-10-CM

## 2024-03-28 ASSESSMENT — PATIENT HEALTH QUESTIONNAIRE - PHQ9
8. MOVING OR SPEAKING SO SLOWLY THAT OTHER PEOPLE COULD HAVE NOTICED. OR THE OPPOSITE, BEING SO FIGETY OR RESTLESS THAT YOU HAVE BEEN MOVING AROUND A LOT MORE THAN USUAL: NOT AT ALL
SUM OF ALL RESPONSES TO PHQ9 QUESTIONS 1 & 2: 0
5. POOR APPETITE OR OVEREATING: NOT AT ALL
9. THOUGHTS THAT YOU WOULD BE BETTER OFF DEAD, OR OF HURTING YOURSELF: NOT AT ALL
SUM OF ALL RESPONSES TO PHQ QUESTIONS 1-9: 0
2. FEELING DOWN, DEPRESSED OR HOPELESS: NOT AT ALL
7. TROUBLE CONCENTRATING ON THINGS, SUCH AS READING THE NEWSPAPER OR WATCHING TELEVISION: NOT AT ALL
4. FEELING TIRED OR HAVING LITTLE ENERGY: NOT AT ALL
3. TROUBLE FALLING OR STAYING ASLEEP: NOT AT ALL
SUM OF ALL RESPONSES TO PHQ QUESTIONS 1-9: 0
1. LITTLE INTEREST OR PLEASURE IN DOING THINGS: NOT AT ALL
6. FEELING BAD ABOUT YOURSELF - OR THAT YOU ARE A FAILURE OR HAVE LET YOURSELF OR YOUR FAMILY DOWN: NOT AT ALL

## 2024-03-28 ASSESSMENT — ENCOUNTER SYMPTOMS
NAUSEA: 0
BLOOD IN STOOL: 0
INDIGESTION: 0
EYE PAIN: 0
COUGH: 0
DIARRHEA: 0
BACK PAIN: 0
SHORTNESS OF BREATH: 0
ABDOMINAL PAIN: 0
CONSTIPATION: 0
WHEEZING: 0
EYE DISCHARGE: 0
SKIN LESIONS: 0
HEARTBURN: 0
VOMITING: 0

## 2024-03-28 NOTE — PROGRESS NOTES
(ZYPREXA) 2.5 MG tablet, Take 2 tablets by mouth nightly, Disp: , Rfl:     carvedilol (COREG) 6.25 MG tablet, Take 1 tablet by mouth 2 times daily (with meals), Disp: 180 tablet, Rfl: 3    QUEtiapine (SEROQUEL) 25 MG tablet, Take 1 tablet by mouth 2 times daily as needed, Disp: , Rfl:     No Known Allergies      Review of Systems   Constitutional:  Negative for fever and unexpected weight change.   Respiratory:  Negative for chest tightness and shortness of breath.    Genitourinary:  Negative for difficulty urinating.   Neurological:  Negative for dizziness.         Vitals:    03/28/24 1407   BP: (!) 100/50   Pulse: 100   Temp: 98.9 °F (37.2 °C)   TempSrc: Temporal   SpO2: 91%   Weight: 51.7 kg (114 lb)   Height: 1.727 m (5' 8\")     Wt Readings from Last 3 Encounters:   03/28/24 51.7 kg (114 lb)   01/11/24 45.9 kg (101 lb 3.2 oz)   12/31/23 47 kg (103 lb 11.2 oz)           Physical Exam  Constitutional:       Appearance: He is not ill-appearing.   Eyes:      Extraocular Movements: Extraocular movements intact.   Pulmonary:      Effort: Pulmonary effort is normal. No respiratory distress.   Neurological:      General: No focal deficit present.      Mental Status: He is alert. Mental status is at baseline.   Psychiatric:         Thought Content: Thought content normal.            Peter was seen today for follow-up.    Diagnoses and all orders for this visit:    Primary hypertension    Attention to ileostomy ([facility])  -     Comprehensive Metabolic Panel; Future  -     CBC; Future  -     Vitamin B12; Future  -     Vitamin D 25 Hydroxy; Future  -     Magnesium; Future    Severe protein-calorie malnutrition (HCC)  -     Comprehensive Metabolic Panel; Future  -     CBC; Future  -     Vitamin B12; Future  -     Vitamin D 25 Hydroxy; Future  -     Magnesium; Future    Bipolar affective disorder, remission status unspecified ([facility])                 Bucky Armenta DO

## 2024-03-29 ENCOUNTER — OFFICE VISIT (OUTPATIENT)
Dept: UROLOGY | Age: 79
End: 2024-03-29
Payer: MEDICARE

## 2024-03-29 DIAGNOSIS — N31.9 NEUROGENIC BLADDER: ICD-10-CM

## 2024-03-29 DIAGNOSIS — R33.9 URINARY RETENTION: Primary | ICD-10-CM

## 2024-03-29 DIAGNOSIS — N39.41 URGE URINARY INCONTINENCE: ICD-10-CM

## 2024-03-29 DIAGNOSIS — N13.5 STRICTURE OF RIGHT URETER: ICD-10-CM

## 2024-03-29 PROCEDURE — 1123F ACP DISCUSS/DSCN MKR DOCD: CPT | Performed by: UROLOGY

## 2024-03-29 PROCEDURE — G8427 DOCREV CUR MEDS BY ELIG CLIN: HCPCS | Performed by: UROLOGY

## 2024-03-29 PROCEDURE — G8484 FLU IMMUNIZE NO ADMIN: HCPCS | Performed by: UROLOGY

## 2024-03-29 PROCEDURE — 99214 OFFICE O/P EST MOD 30 MIN: CPT | Performed by: UROLOGY

## 2024-03-29 PROCEDURE — 1036F TOBACCO NON-USER: CPT | Performed by: UROLOGY

## 2024-03-29 PROCEDURE — G8419 CALC BMI OUT NRM PARAM NOF/U: HCPCS | Performed by: UROLOGY

## 2024-03-29 NOTE — PROGRESS NOTES
[facility]  [address]   [address]  [place] [address]  [phone]    Peter Nolasco  : 1945    CC: Followup       HPI     Peter Nolasco is a 78 y.o. male with a very complex past urologic history.  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  Last stent exchange 23.  Returns for follow up.     Today, he reports doing well on his medications with much less U/F/UUI.  UUI has been his big issue with leaking around the catheter.  He is on ditropan 10 mg XL daily and levsin q4h PRN bladder spasms.  Next stent exchange due 2024.     Past Medical History:   Diagnosis Date    Acute deep vein thrombosis (DVT) of non-extremity vein 2019    Anemia     Cancer (HCC)     Family history of malignant neoplasm of gastrointestinal tract     mother colon/ovarian cancer 72    Fecal incontinence     LAR syndrome    John catheter in place     patient stated- \"catheter due to them nicking his bladder and it won't heal\"    Former cigarette smoker     History of rectal cancer     Hypothyroid     stable w/med    Infection and inflammatory reaction due to other internal prosthetic devices, implants and grafts, subsequent encounter     abd wound/mesh colostomy    Insomnia     takes meds    Major depression     Nausea and vomiting 2023    Osteoarthritis, hand     Personal history of colonic polyps 9/2013    x 1    Personal history of malignant neoplasm of rectum, rectosigmoid junction, and anus 2013    surgery and radiation    Psychiatric disorder     anxiety    Thyroid disease      Past Surgical History:   Procedure Laterality Date    COLONOSCOPY  last 2/3/15    Roberto--no polyps--3 year recall    COLONOSCOPY N/A 3/5/2021    COLONOSCOPY/BMI 19 performed by Santiago Dominguez

## 2024-04-03 ASSESSMENT — ENCOUNTER SYMPTOMS: CHEST TIGHTNESS: 0

## 2024-04-04 ENCOUNTER — TELEPHONE (OUTPATIENT)
Dept: INTERNAL MEDICINE CLINIC | Facility: CLINIC | Age: 79
End: 2024-04-04

## 2024-04-04 DIAGNOSIS — R26.89 OTHER ABNORMALITIES OF GAIT AND MOBILITY: Primary | ICD-10-CM

## 2024-04-04 NOTE — TELEPHONE ENCOUNTER
Called and left a message on his voice mail. PT for what? I don't see where he discuss this with Dr Armenta at his visit on 3/28/24.

## 2024-04-05 ENCOUNTER — TELEPHONE (OUTPATIENT)
Dept: INTERNAL MEDICINE CLINIC | Facility: CLINIC | Age: 79
End: 2024-04-05

## 2024-04-05 NOTE — TELEPHONE ENCOUNTER
Per , patient called back and needs referral to PT to help him walk with his walker  [facility] and he wants to go to [facility]     Referral placed

## 2024-04-05 NOTE — TELEPHONE ENCOUNTER
Need referral to PT to help him walk with his walker  [facility] and [facility] wants to go to [facility]

## 2024-04-13 ENCOUNTER — APPOINTMENT (OUTPATIENT)
Dept: GENERAL RADIOLOGY | Age: 79
DRG: 872 | End: 2024-04-13
Payer: MEDICARE

## 2024-04-13 ENCOUNTER — HOSPITAL ENCOUNTER (INPATIENT)
Age: 79
LOS: 3 days | Discharge: HOME HEALTH | DRG: 872 | End: 2024-04-16
Attending: EMERGENCY MEDICINE | Admitting: FAMILY MEDICINE
Payer: MEDICARE

## 2024-04-13 DIAGNOSIS — A41.9 SEPTICEMIA (HCC): Primary | ICD-10-CM

## 2024-04-13 DIAGNOSIS — N30.00 ACUTE CYSTITIS WITHOUT HEMATURIA: ICD-10-CM

## 2024-04-13 DIAGNOSIS — N17.9 AKI (ACUTE KIDNEY INJURY) (HCC): ICD-10-CM

## 2024-04-13 LAB
ALBUMIN SERPL-MCNC: 2.8 G/DL (ref 3.2–4.6)
ALBUMIN/GLOB SERPL: 0.8 (ref 0.4–1.6)
ALP SERPL-CCNC: 71 U/L (ref 50–136)
ALT SERPL-CCNC: 27 U/L (ref 12–65)
ANION GAP BLD CALC-SCNC: ABNORMAL MMOL/L
ANION GAP SERPL CALC-SCNC: 8 MMOL/L (ref 2–11)
APPEARANCE UR: ABNORMAL
ARTERIAL PATENCY WRIST A: POSITIVE
AST SERPL-CCNC: 31 U/L (ref 15–37)
BACTERIA URNS QL MICRO: ABNORMAL /HPF
BASE EXCESS BLD CALC-SCNC: 1.9 MMOL/L
BASOPHILS # BLD: 0 K/UL (ref 0–0.2)
BASOPHILS NFR BLD: 1 % (ref 0–2)
BDY SITE: ABNORMAL
BILIRUB SERPL-MCNC: 0.3 MG/DL (ref 0.2–1.1)
BILIRUB UR QL: NEGATIVE
BUN SERPL-MCNC: 41 MG/DL (ref 8–23)
CA-I BLD-MCNC: 1.14 MMOL/L (ref 1.12–1.32)
CALCIUM SERPL-MCNC: 8.1 MG/DL (ref 8.3–10.4)
CHLORIDE SERPL-SCNC: 108 MMOL/L (ref 103–113)
CO2 BLD-SCNC: 23 MMOL/L (ref 13–23)
CO2 SERPL-SCNC: 25 MMOL/L (ref 21–32)
COLOR UR: ABNORMAL
CREAT SERPL-MCNC: 1.8 MG/DL (ref 0.8–1.5)
DIFFERENTIAL METHOD BLD: ABNORMAL
EOSINOPHIL # BLD: 0 K/UL (ref 0–0.8)
EOSINOPHIL NFR BLD: 1 % (ref 0.5–7.8)
EPI CELLS #/AREA URNS HPF: ABNORMAL /HPF
ERYTHROCYTE [DISTWIDTH] IN BLOOD BY AUTOMATED COUNT: 14.3 % (ref 11.9–14.6)
GAS FLOW.O2 O2 DELIVERY SYS: ABNORMAL
GLOBULIN SER CALC-MCNC: 3.7 G/DL (ref 2.8–4.5)
GLUCOSE BLD STRIP.AUTO-MCNC: 186 MG/DL (ref 65–100)
GLUCOSE SERPL-MCNC: 204 MG/DL (ref 65–100)
GLUCOSE UR STRIP.AUTO-MCNC: NEGATIVE MG/DL
HCO3 BLD-SCNC: 23.8 MMOL/L (ref 22–26)
HCT VFR BLD AUTO: 32.5 % (ref 41.1–50.3)
HGB BLD-MCNC: 10.3 G/DL (ref 13.6–17.2)
HGB UR QL STRIP: ABNORMAL
IMM GRANULOCYTES # BLD AUTO: 0 K/UL (ref 0–0.5)
IMM GRANULOCYTES NFR BLD AUTO: 0 % (ref 0–5)
KETONES UR QL STRIP.AUTO: NEGATIVE MG/DL
LACTATE SERPL-SCNC: 2.4 MMOL/L (ref 0.4–2)
LACTATE SERPL-SCNC: 3 MMOL/L (ref 0.4–2)
LEUKOCYTE ESTERASE UR QL STRIP.AUTO: ABNORMAL
LYMPHOCYTES # BLD: 0.7 K/UL (ref 0.5–4.6)
LYMPHOCYTES NFR BLD: 15 % (ref 13–44)
MCH RBC QN AUTO: 27.4 PG (ref 26.1–32.9)
MCHC RBC AUTO-ENTMCNC: 31.7 G/DL (ref 31.4–35)
MCV RBC AUTO: 86.4 FL (ref 82–102)
MONOCYTES # BLD: 0.4 K/UL (ref 0.1–1.3)
MONOCYTES NFR BLD: 8 % (ref 4–12)
NEUTS SEG # BLD: 3.3 K/UL (ref 1.7–8.2)
NEUTS SEG NFR BLD: 75 % (ref 43–78)
NITRITE UR QL STRIP.AUTO: POSITIVE
NRBC # BLD: 0 K/UL (ref 0–0.2)
OTHER OBSERVATIONS: ABNORMAL
PCO2 BLD: 28.2 MMHG (ref 35–45)
PH BLD: 7.54 (ref 7.35–7.45)
PH UR STRIP: 5.5 (ref 5–9)
PLATELET # BLD AUTO: 276 K/UL (ref 150–450)
PMV BLD AUTO: 8.6 FL (ref 9.4–12.3)
PO2 BLD: 49 MMHG (ref 75–100)
POTASSIUM BLD-SCNC: 4.3 MMOL/L (ref 3.5–5.1)
POTASSIUM SERPL-SCNC: 4.3 MMOL/L (ref 3.5–5.1)
PROCALCITONIN SERPL-MCNC: 0.07 NG/ML (ref 0–0.49)
PROT SERPL-MCNC: 6.5 G/DL (ref 6.3–8.2)
PROT UR STRIP-MCNC: 100 MG/DL
RBC # BLD AUTO: 3.76 M/UL (ref 4.23–5.6)
RBC #/AREA URNS HPF: >100 /HPF
SAO2 % BLD: 89 %
SERVICE CMNT-IMP: ABNORMAL
SODIUM BLD-SCNC: 140 MMOL/L (ref 136–145)
SODIUM SERPL-SCNC: 141 MMOL/L (ref 136–146)
SP GR UR REFRACTOMETRY: 1.02 (ref 1–1.02)
SPECIMEN SITE: ABNORMAL
TSH W FREE THYROID IF ABNORMAL: 1.07 UIU/ML (ref 0.36–3.74)
UROBILINOGEN UR QL STRIP.AUTO: 0.2 EU/DL (ref 0.2–1)
WBC # BLD AUTO: 4.4 K/UL (ref 4.3–11.1)
WBC URNS QL MICRO: >100 /HPF

## 2024-04-13 PROCEDURE — 84443 ASSAY THYROID STIM HORMONE: CPT

## 2024-04-13 PROCEDURE — 87086 URINE CULTURE/COLONY COUNT: CPT

## 2024-04-13 PROCEDURE — 2580000003 HC RX 258: Performed by: EMERGENCY MEDICINE

## 2024-04-13 PROCEDURE — 82947 ASSAY GLUCOSE BLOOD QUANT: CPT

## 2024-04-13 PROCEDURE — 2700000000 HC OXYGEN THERAPY PER DAY

## 2024-04-13 PROCEDURE — 84132 ASSAY OF SERUM POTASSIUM: CPT

## 2024-04-13 PROCEDURE — 84295 ASSAY OF SERUM SODIUM: CPT

## 2024-04-13 PROCEDURE — 6360000002 HC RX W HCPCS: Performed by: EMERGENCY MEDICINE

## 2024-04-13 PROCEDURE — 87040 BLOOD CULTURE FOR BACTERIA: CPT

## 2024-04-13 PROCEDURE — 1100000003 HC PRIVATE W/ TELEMETRY

## 2024-04-13 PROCEDURE — 84145 PROCALCITONIN (PCT): CPT

## 2024-04-13 PROCEDURE — 99285 EMERGENCY DEPT VISIT HI MDM: CPT

## 2024-04-13 PROCEDURE — 82803 BLOOD GASES ANY COMBINATION: CPT

## 2024-04-13 PROCEDURE — 96374 THER/PROPH/DIAG INJ IV PUSH: CPT

## 2024-04-13 PROCEDURE — 87088 URINE BACTERIA CULTURE: CPT

## 2024-04-13 PROCEDURE — 6370000000 HC RX 637 (ALT 250 FOR IP): Performed by: FAMILY MEDICINE

## 2024-04-13 PROCEDURE — 85025 COMPLETE CBC W/AUTO DIFF WBC: CPT

## 2024-04-13 PROCEDURE — 2580000003 HC RX 258: Performed by: FAMILY MEDICINE

## 2024-04-13 PROCEDURE — 80053 COMPREHEN METABOLIC PANEL: CPT

## 2024-04-13 PROCEDURE — 6370000000 HC RX 637 (ALT 250 FOR IP): Performed by: EMERGENCY MEDICINE

## 2024-04-13 PROCEDURE — 71045 X-RAY EXAM CHEST 1 VIEW: CPT

## 2024-04-13 PROCEDURE — 96375 TX/PRO/DX INJ NEW DRUG ADDON: CPT

## 2024-04-13 PROCEDURE — 93005 ELECTROCARDIOGRAM TRACING: CPT | Performed by: EMERGENCY MEDICINE

## 2024-04-13 PROCEDURE — 81001 URINALYSIS AUTO W/SCOPE: CPT

## 2024-04-13 PROCEDURE — 83605 ASSAY OF LACTIC ACID: CPT

## 2024-04-13 PROCEDURE — 87186 SC STD MICRODIL/AGAR DIL: CPT

## 2024-04-13 PROCEDURE — 82330 ASSAY OF CALCIUM: CPT

## 2024-04-13 RX ORDER — OXYBUTYNIN CHLORIDE 10 MG/1
10 TABLET, EXTENDED RELEASE ORAL NIGHTLY
Status: DISCONTINUED | OUTPATIENT
Start: 2024-04-13 | End: 2024-04-16 | Stop reason: HOSPADM

## 2024-04-13 RX ORDER — HEPARIN SODIUM 5000 [USP'U]/ML
5000 INJECTION, SOLUTION INTRAVENOUS; SUBCUTANEOUS 2 TIMES DAILY
Status: DISCONTINUED | OUTPATIENT
Start: 2024-04-14 | End: 2024-04-16 | Stop reason: HOSPADM

## 2024-04-13 RX ORDER — CARVEDILOL 3.12 MG/1
6.25 TABLET ORAL 2 TIMES DAILY WITH MEALS
Status: DISCONTINUED | OUTPATIENT
Start: 2024-04-13 | End: 2024-04-16 | Stop reason: HOSPADM

## 2024-04-13 RX ORDER — KETOROLAC TROMETHAMINE 15 MG/ML
15 INJECTION, SOLUTION INTRAMUSCULAR; INTRAVENOUS
Status: COMPLETED | OUTPATIENT
Start: 2024-04-13 | End: 2024-04-13

## 2024-04-13 RX ORDER — POLYETHYLENE GLYCOL 3350 17 G/17G
17 POWDER, FOR SOLUTION ORAL DAILY PRN
Status: DISCONTINUED | OUTPATIENT
Start: 2024-04-13 | End: 2024-04-16 | Stop reason: HOSPADM

## 2024-04-13 RX ORDER — CLONAZEPAM 1 MG/1
4 TABLET ORAL NIGHTLY PRN
Status: DISCONTINUED | OUTPATIENT
Start: 2024-04-13 | End: 2024-04-16 | Stop reason: HOSPADM

## 2024-04-13 RX ORDER — SODIUM CHLORIDE 9 MG/ML
INJECTION, SOLUTION INTRAVENOUS CONTINUOUS
Status: DISCONTINUED | OUTPATIENT
Start: 2024-04-13 | End: 2024-04-14

## 2024-04-13 RX ORDER — AMITRIPTYLINE HYDROCHLORIDE 50 MG/1
100 TABLET, FILM COATED ORAL NIGHTLY
Status: DISCONTINUED | OUTPATIENT
Start: 2024-04-13 | End: 2024-04-16 | Stop reason: HOSPADM

## 2024-04-13 RX ORDER — OLANZAPINE 5 MG/1
5 TABLET ORAL NIGHTLY
Status: DISCONTINUED | OUTPATIENT
Start: 2024-04-13 | End: 2024-04-16 | Stop reason: HOSPADM

## 2024-04-13 RX ORDER — ACETAMINOPHEN 325 MG/1
650 TABLET ORAL EVERY 6 HOURS PRN
Status: DISCONTINUED | OUTPATIENT
Start: 2024-04-13 | End: 2024-04-16 | Stop reason: HOSPADM

## 2024-04-13 RX ORDER — SODIUM CHLORIDE 0.9 % (FLUSH) 0.9 %
5-40 SYRINGE (ML) INJECTION PRN
Status: DISCONTINUED | OUTPATIENT
Start: 2024-04-13 | End: 2024-04-16 | Stop reason: HOSPADM

## 2024-04-13 RX ORDER — ACETAMINOPHEN 650 MG/1
650 SUPPOSITORY RECTAL
Status: COMPLETED | OUTPATIENT
Start: 2024-04-13 | End: 2024-04-13

## 2024-04-13 RX ORDER — ACETAMINOPHEN 650 MG/1
650 SUPPOSITORY RECTAL EVERY 6 HOURS PRN
Status: DISCONTINUED | OUTPATIENT
Start: 2024-04-13 | End: 2024-04-16 | Stop reason: HOSPADM

## 2024-04-13 RX ORDER — SODIUM CHLORIDE, SODIUM LACTATE, POTASSIUM CHLORIDE, AND CALCIUM CHLORIDE .6; .31; .03; .02 G/100ML; G/100ML; G/100ML; G/100ML
30 INJECTION, SOLUTION INTRAVENOUS ONCE
Status: COMPLETED | OUTPATIENT
Start: 2024-04-13 | End: 2024-04-13

## 2024-04-13 RX ORDER — ONDANSETRON 4 MG/1
4 TABLET, ORALLY DISINTEGRATING ORAL EVERY 8 HOURS PRN
Status: DISCONTINUED | OUTPATIENT
Start: 2024-04-13 | End: 2024-04-16 | Stop reason: HOSPADM

## 2024-04-13 RX ORDER — QUETIAPINE FUMARATE 25 MG/1
25 TABLET, FILM COATED ORAL 2 TIMES DAILY PRN
Status: DISCONTINUED | OUTPATIENT
Start: 2024-04-13 | End: 2024-04-16 | Stop reason: HOSPADM

## 2024-04-13 RX ORDER — SODIUM CHLORIDE 0.9 % (FLUSH) 0.9 %
5-40 SYRINGE (ML) INJECTION EVERY 12 HOURS SCHEDULED
Status: DISCONTINUED | OUTPATIENT
Start: 2024-04-13 | End: 2024-04-16 | Stop reason: HOSPADM

## 2024-04-13 RX ORDER — ONDANSETRON 2 MG/ML
4 INJECTION INTRAMUSCULAR; INTRAVENOUS EVERY 6 HOURS PRN
Status: DISCONTINUED | OUTPATIENT
Start: 2024-04-13 | End: 2024-04-16 | Stop reason: HOSPADM

## 2024-04-13 RX ORDER — SODIUM CHLORIDE 9 MG/ML
INJECTION, SOLUTION INTRAVENOUS PRN
Status: DISCONTINUED | OUTPATIENT
Start: 2024-04-13 | End: 2024-04-16 | Stop reason: HOSPADM

## 2024-04-13 RX ORDER — MIRTAZAPINE 15 MG/1
15 TABLET, FILM COATED ORAL NIGHTLY
Status: DISCONTINUED | OUTPATIENT
Start: 2024-04-13 | End: 2024-04-16 | Stop reason: HOSPADM

## 2024-04-13 RX ORDER — HYDROXYZINE HYDROCHLORIDE 25 MG/1
50 TABLET, FILM COATED ORAL 2 TIMES DAILY
Status: DISCONTINUED | OUTPATIENT
Start: 2024-04-13 | End: 2024-04-16 | Stop reason: HOSPADM

## 2024-04-13 RX ORDER — ENOXAPARIN SODIUM 100 MG/ML
30 INJECTION SUBCUTANEOUS DAILY
Status: DISCONTINUED | OUTPATIENT
Start: 2024-04-13 | End: 2024-04-13 | Stop reason: ALTCHOICE

## 2024-04-13 RX ORDER — ACETAMINOPHEN 500 MG
1000 TABLET ORAL
Status: DISCONTINUED | OUTPATIENT
Start: 2024-04-13 | End: 2024-04-13

## 2024-04-13 RX ADMIN — SODIUM CHLORIDE, PRESERVATIVE FREE 10 ML: 5 INJECTION INTRAVENOUS at 21:56

## 2024-04-13 RX ADMIN — ACETAMINOPHEN 650 MG: 650 SUPPOSITORY RECTAL at 16:46

## 2024-04-13 RX ADMIN — OLANZAPINE 5 MG: 5 TABLET, FILM COATED ORAL at 21:56

## 2024-04-13 RX ADMIN — WATER 2000 MG: 1 INJECTION INTRAMUSCULAR; INTRAVENOUS; SUBCUTANEOUS at 16:23

## 2024-04-13 RX ADMIN — KETOROLAC TROMETHAMINE 15 MG: 15 INJECTION, SOLUTION INTRAMUSCULAR; INTRAVENOUS at 16:36

## 2024-04-13 RX ADMIN — MIRTAZAPINE 15 MG: 15 TABLET, FILM COATED ORAL at 21:56

## 2024-04-13 RX ADMIN — AMITRIPTYLINE HYDROCHLORIDE 100 MG: 50 TABLET, FILM COATED ORAL at 21:56

## 2024-04-13 RX ADMIN — VANCOMYCIN HYDROCHLORIDE 1250 MG: 10 INJECTION, POWDER, LYOPHILIZED, FOR SOLUTION INTRAVENOUS at 16:56

## 2024-04-13 RX ADMIN — HYDROXYZINE HYDROCHLORIDE 50 MG: 25 TABLET, FILM COATED ORAL at 21:55

## 2024-04-13 RX ADMIN — SODIUM CHLORIDE: 9 INJECTION, SOLUTION INTRAVENOUS at 22:05

## 2024-04-13 RX ADMIN — OXYBUTYNIN CHLORIDE 10 MG: 10 TABLET, EXTENDED RELEASE ORAL at 21:55

## 2024-04-13 RX ADMIN — SODIUM CHLORIDE, POTASSIUM CHLORIDE, SODIUM LACTATE AND CALCIUM CHLORIDE 1551 ML: 600; 310; 30; 20 INJECTION, SOLUTION INTRAVENOUS at 16:24

## 2024-04-13 RX ADMIN — CLONAZEPAM 4 MG: 1 TABLET ORAL at 21:55

## 2024-04-13 RX ADMIN — CARVEDILOL 6.25 MG: 3.12 TABLET, FILM COATED ORAL at 21:59

## 2024-04-13 RX ADMIN — QUETIAPINE FUMARATE 25 MG: 25 TABLET ORAL at 21:56

## 2024-04-13 NOTE — ED PROVIDER NOTES
Emergency Department Provider Note       PCP: Bucky Armenta DO   Age: 78 y.o.   Sex: male     DISPOSITION Decision To Admit 04/13/2024 06:58:06 PM       ICD-10-CM    1. Septicemia (HCC)  A41.9       2. Acute cystitis without hematuria  N30.00       3. KEVAN (acute kidney injury) (HCC)  N17.9           Medical Decision Making     Patient's vital signs improved with fluids as well as antipyretic.  Source of fever appears to be urine.  Referred to hospitalist for admission for sepsis with UTI.     1 or more acute illnesses that pose a threat to life or bodily function.   Shared medical decision making was utilized in creating the patients health plan today.    I independently ordered and reviewed each unique test.  I reviewed external records: ED visit note from an outside group.  I reviewed external records: previous EKG including cardiologist interpretation.       I interpreted the EKG at 1614 shows supraventricular tachycardia with a rate of 152 bpm.  Nonspecific interventricular conduction delay.  Morphology appears consistent with EKG of 9/20/2023..        SEP-1 CORE MEASURE    Fluid Resuscitation Rational: at least 30mL/kg based on entered actual weight at time of triage    Repeat Lactate Level: ordered and pending at this time    Reassessment Exam: I completed the sepsis fluid reassessment on 4/13/24 5:57 PM EDT.     Critical Care Procedure Note: 120 minutes of critical care time was performed in the emergency department.  This time was separate from any other procedures listed during the patients emergency department course. The failure to initiate these interventions on an urgent basis would likely have resulted in sudden, clinically significant or life-threatening deterioration in the patients condition.  The patient met the criteria for sepsis core measures implementation and this included parenteral fluid resuscitation, parental antibiotics, monitoring, and reassessments.     Critical care procedure    Result Value Ref Range    POC pH 7.54 (H) 7.35 - 7.45      POC pCO2 28.2 (L) 35 - 45 MMHG    POC PO2 49 (L) 75 - 100 MMHG    POC Sodium 140 136 - 145 MMOL/L    POC Potassium 4.3 3.5 - 5.1 MMOL/L    POC Ionized Calcium 1.14 1.12 - 1.32 mmol/L    POC Glucose 186 (H) 65 - 100 MG/DL    Base Excess 1.9 mmol/L    POC HCO3 23.8 22 - 26 MMOL/L    POC TCO2 23 13 - 23 MMOL/L    POC O2 SAT 89 %    Source ARTERIAL      Site LEFT RADIAL      Prem Test Positive      DEVICE ROOM AIR      Performed by: Price     Anion Gap, POC Cannot be calculated  Cannot be calculated   mmol/L    eGFR, POC Cannot be calculated >60 ml/min/1.73m2         XR CHEST PORTABLE   Final Result   No acute findings in the chest.      Elevation of the left hemidiaphragm with left lung base atelectasis.                      No results for input(s): \"COVID19\" in the last 72 hours.    Voice dictation software was used during the making of this note.  This software is not perfect and grammatical and other typographical errors may be present.  This note has not been completely proofread for errors.     Vitaliy Anthony, DO  04/13/24 9560

## 2024-04-13 NOTE — H&P
28.2 (L) 35 - 45 MMHG    POC PO2 49 (L) 75 - 100 MMHG    POC Sodium 140 136 - 145 MMOL/L    POC Potassium 4.3 3.5 - 5.1 MMOL/L    POC Ionized Calcium 1.14 1.12 - 1.32 mmol/L    POC Glucose 186 (H) 65 - 100 MG/DL    Base Excess 1.9 mmol/L    POC HCO3 23.8 22 - 26 MMOL/L    POC TCO2 23 13 - 23 MMOL/L    POC O2 SAT 89 %    Source ARTERIAL      Site LEFT RADIAL      Prem Test Positive      DEVICE ROOM AIR      Performed by: Price     Anion Gap, POC Cannot be calculated  Cannot be calculated   mmol/L    eGFR, POC Cannot be calculated >60 ml/min/1.73m2   Comprehensive Metabolic Panel    Collection Time: 04/13/24  4:34 PM   Result Value Ref Range    Sodium 141 136 - 146 mmol/L    Potassium 4.3 3.5 - 5.1 mmol/L    Chloride 108 103 - 113 mmol/L    CO2 25 21 - 32 mmol/L    Anion Gap 8 2 - 11 mmol/L    Glucose 204 (H) 65 - 100 mg/dL    BUN 41 (H) 8 - 23 MG/DL    Creatinine 1.80 (H) 0.8 - 1.5 MG/DL    Est, Glom Filt Rate 38 (L) >60 ml/min/1.73m2    Calcium 8.1 (L) 8.3 - 10.4 MG/DL    Total Bilirubin 0.3 0.2 - 1.1 MG/DL    ALT 27 12 - 65 U/L    AST 31 15 - 37 U/L    Alk Phosphatase 71 50 - 136 U/L    Total Protein 6.5 6.3 - 8.2 g/dL    Albumin 2.8 (L) 3.2 - 4.6 g/dL    Globulin 3.7 2.8 - 4.5 g/dL    Albumin/Globulin Ratio 0.8 0.4 - 1.6     CBC with Auto Differential    Collection Time: 04/13/24  4:34 PM   Result Value Ref Range    WBC 4.4 4.3 - 11.1 K/uL    RBC 3.76 (L) 4.23 - 5.6 M/uL    Hemoglobin 10.3 (L) 13.6 - 17.2 g/dL    Hematocrit 32.5 (L) 41.1 - 50.3 %    MCV 86.4 82 - 102 FL    MCH 27.4 26.1 - 32.9 PG    MCHC 31.7 31.4 - 35.0 g/dL    RDW 14.3 11.9 - 14.6 %    Platelets 276 150 - 450 K/uL    MPV 8.6 (L) 9.4 - 12.3 FL    nRBC 0.00 0.0 - 0.2 K/uL    Differential Type AUTOMATED      Neutrophils % 75 43 - 78 %    Lymphocytes % 15 13 - 44 %    Monocytes % 8 4.0 - 12.0 %    Eosinophils % 1 0.5 - 7.8 %    Basophils % 1 0.0 - 2.0 %    Immature Granulocytes % 0 0.0 - 5.0 %    Neutrophils Absolute 3.3 1.7 - 8.2 K/UL     Lymphocytes Absolute 0.7 0.5 - 4.6 K/UL    Monocytes Absolute 0.4 0.1 - 1.3 K/UL    Eosinophils Absolute 0.0 0.0 - 0.8 K/UL    Basophils Absolute 0.0 0.0 - 0.2 K/UL    Immature Granulocytes Absolute 0.0 0.0 - 0.5 K/UL   Procalcitonin    Collection Time: 04/13/24  4:34 PM   Result Value Ref Range    Procalcitonin 0.07 0.00 - 0.49 ng/mL   TSH with Reflex    Collection Time: 04/13/24  4:34 PM   Result Value Ref Range    TSH w Free Thyroid if Abnormal 1.07 0.358 - 3.740 UIU/ML   Lactate, Sepsis    Collection Time: 04/13/24  4:46 PM   Result Value Ref Range    Lactic Acid, Sepsis 3.0 (H) 0.4 - 2.0 MMOL/L   Urinalysis w/Reflex to Micro    Collection Time: 04/13/24  5:20 PM   Result Value Ref Range    Color, UA YELLOW/STRAW      Appearance TURBID      Specific Gravity, UA 1.017 1.001 - 1.023      pH, Urine 5.5 5.0 - 9.0      Protein,  (A) NEG mg/dL    Glucose, UA Negative mg/dL    Ketones, Urine Negative NEG mg/dL    Bilirubin Urine Negative NEG      Blood, Urine LARGE (A) NEG      Urobilinogen, Urine 0.2 0.2 - 1.0 EU/dL    Nitrite, Urine Positive (A) NEG      Leukocyte Esterase, Urine LARGE (A) NEG      WBC, UA >100 0 /hpf    RBC, UA >100 0 /hpf    Epithelial Cells UA 0-3 0 /hpf    BACTERIA, URINE 4+ (H) 0 /hpf    Other observations RESULTS VERIFIED MANUALLY     Lactate, Sepsis    Collection Time: 04/13/24  6:07 PM   Result Value Ref Range    Lactic Acid, Sepsis 2.4 (H) 0.4 - 2.0 MMOL/L       No results for input(s): \"COVID19\" in the last 72 hours.    XR CHEST PORTABLE    Result Date: 4/13/2024  Chest X-ray INDICATION: Sepsis COMPARISON: X-ray chest November 3, 2023 TECHNIQUE: Frontal view of the chest was obtained. FINDINGS: Elevation of the left hemidiaphragm with left lung base atelectasis.. The heart size is normal.  The bony thorax is intact.      No acute findings in the chest. Elevation of the left hemidiaphragm with left lung base atelectasis.         Signed:  KJ WYNN MD    Part of this note may

## 2024-04-13 NOTE — PROGRESS NOTES
TRANSFER - IN REPORT:    Verbal report received from MIGUEL Angeles on Peter Nolasco  being received from ED for routine progression of patient care      Report consisted of patient’s Situation, Background, Assessment and   Recommendations(SBAR).     Information from the following report(s) Nurse Handoff Report was reviewed with the receiving nurse.    Opportunity for questions and clarification was provided.      Assessment completed upon patient’s arrival to unit and care assume

## 2024-04-13 NOTE — ED NOTES
TRANSFER - OUT REPORT:    Verbal report given to MIGUEL Domínguez on Peter Nolasco  being transferred to 8th floor for routine progression of patient care       Report consisted of patient's Situation, Background, Assessment and   Recommendations(SBAR).     Information from the following report(s) Nurse Handoff Report was reviewed with the receiving nurse.    Gaston Fall Assessment:    Presents to emergency department  because of falls (Syncope, seizure, or loss of consciousness): No  Age > 70: Yes  Altered Mental Status, Intoxication with alcohol or substance confusion (Disorientation, impaired judgment, poor safety awaremess, or inability to follow instructions): Yes  Impaired Mobility: Ambulates or transfers with assistive devices or assistance; Unable to ambulate or transer.: Yes  Nursing Judgement: Yes          Lines:   Peripheral IV 04/13/24 Right Forearm (Active)       Peripheral IV 04/13/24 Left Antecubital (Active)        Opportunity for questions and clarification was provided.      Patient transported with:  Bridgette Narayanan RN  04/13/24 [id number]
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
## 2024-04-14 LAB
ANION GAP SERPL CALC-SCNC: 3 MMOL/L (ref 2–11)
BASOPHILS # BLD: 0 K/UL (ref 0–0.2)
BASOPHILS NFR BLD: 0 % (ref 0–2)
BUN SERPL-MCNC: 42 MG/DL (ref 8–23)
CALCIUM SERPL-MCNC: 7.6 MG/DL (ref 8.3–10.4)
CHLORIDE SERPL-SCNC: 110 MMOL/L (ref 103–113)
CO2 SERPL-SCNC: 28 MMOL/L (ref 21–32)
CREAT SERPL-MCNC: 1.5 MG/DL (ref 0.8–1.5)
DIFFERENTIAL METHOD BLD: ABNORMAL
EKG ATRIAL RATE: 0 BPM
EKG DIAGNOSIS: NORMAL
EKG P AXIS: 0 DEGREES
EKG P-R INTERVAL: 119 MS
EKG Q-T INTERVAL: 345 MS
EKG QRS DURATION: 135 MS
EKG QTC CALCULATION (BAZETT): 549 MS
EKG R AXIS: 164 DEGREES
EKG T AXIS: 4 DEGREES
EKG VENTRICULAR RATE: 152 BPM
EOSINOPHIL # BLD: 0 K/UL (ref 0–0.8)
EOSINOPHIL NFR BLD: 1 % (ref 0.5–7.8)
ERYTHROCYTE [DISTWIDTH] IN BLOOD BY AUTOMATED COUNT: 14.5 % (ref 11.9–14.6)
GLUCOSE SERPL-MCNC: 103 MG/DL (ref 65–100)
HCT VFR BLD AUTO: 27.8 % (ref 41.1–50.3)
HGB BLD-MCNC: 8.7 G/DL (ref 13.6–17.2)
IMM GRANULOCYTES # BLD AUTO: 0 K/UL (ref 0–0.5)
IMM GRANULOCYTES NFR BLD AUTO: 0 % (ref 0–5)
LACTATE SERPL-SCNC: 1 MMOL/L (ref 0.4–2)
LYMPHOCYTES # BLD: 0.4 K/UL (ref 0.5–4.6)
LYMPHOCYTES NFR BLD: 7 % (ref 13–44)
MAGNESIUM SERPL-MCNC: 1.9 MG/DL (ref 1.8–2.4)
MCH RBC QN AUTO: 28.2 PG (ref 26.1–32.9)
MCHC RBC AUTO-ENTMCNC: 31.3 G/DL (ref 31.4–35)
MCV RBC AUTO: 90 FL (ref 82–102)
MONOCYTES # BLD: 0.6 K/UL (ref 0.1–1.3)
MONOCYTES NFR BLD: 12 % (ref 4–12)
NEUTS SEG # BLD: 4.2 K/UL (ref 1.7–8.2)
NEUTS SEG NFR BLD: 80 % (ref 43–78)
NRBC # BLD: 0 K/UL (ref 0–0.2)
PLATELET # BLD AUTO: 163 K/UL (ref 150–450)
PMV BLD AUTO: 8.5 FL (ref 9.4–12.3)
POTASSIUM SERPL-SCNC: 3.9 MMOL/L (ref 3.5–5.1)
RBC # BLD AUTO: 3.09 M/UL (ref 4.23–5.6)
SODIUM SERPL-SCNC: 141 MMOL/L (ref 136–146)
WBC # BLD AUTO: 5.3 K/UL (ref 4.3–11.1)

## 2024-04-14 PROCEDURE — 85025 COMPLETE CBC W/AUTO DIFF WBC: CPT

## 2024-04-14 PROCEDURE — 83735 ASSAY OF MAGNESIUM: CPT

## 2024-04-14 PROCEDURE — 83605 ASSAY OF LACTIC ACID: CPT

## 2024-04-14 PROCEDURE — 36415 COLL VENOUS BLD VENIPUNCTURE: CPT

## 2024-04-14 PROCEDURE — 6360000002 HC RX W HCPCS: Performed by: FAMILY MEDICINE

## 2024-04-14 PROCEDURE — 1100000003 HC PRIVATE W/ TELEMETRY

## 2024-04-14 PROCEDURE — 97161 PT EVAL LOW COMPLEX 20 MIN: CPT

## 2024-04-14 PROCEDURE — 93010 ELECTROCARDIOGRAM REPORT: CPT | Performed by: INTERNAL MEDICINE

## 2024-04-14 PROCEDURE — 6370000000 HC RX 637 (ALT 250 FOR IP): Performed by: FAMILY MEDICINE

## 2024-04-14 PROCEDURE — 80048 BASIC METABOLIC PNL TOTAL CA: CPT

## 2024-04-14 PROCEDURE — 97530 THERAPEUTIC ACTIVITIES: CPT

## 2024-04-14 PROCEDURE — 2580000003 HC RX 258: Performed by: FAMILY MEDICINE

## 2024-04-14 PROCEDURE — 99221 1ST HOSP IP/OBS SF/LOW 40: CPT | Performed by: PHYSICIAN ASSISTANT

## 2024-04-14 RX ADMIN — OXYBUTYNIN CHLORIDE 10 MG: 10 TABLET, EXTENDED RELEASE ORAL at 22:13

## 2024-04-14 RX ADMIN — MIRTAZAPINE 15 MG: 15 TABLET, FILM COATED ORAL at 22:12

## 2024-04-14 RX ADMIN — HEPARIN SODIUM 5000 UNITS: 5000 INJECTION INTRAVENOUS; SUBCUTANEOUS at 09:03

## 2024-04-14 RX ADMIN — CARVEDILOL 6.25 MG: 3.12 TABLET, FILM COATED ORAL at 16:26

## 2024-04-14 RX ADMIN — HYOSCYAMINE SULFATE 0.12 MG: 0.12 TABLET ORAL; SUBLINGUAL at 22:09

## 2024-04-14 RX ADMIN — CARVEDILOL 6.25 MG: 3.12 TABLET, FILM COATED ORAL at 09:03

## 2024-04-14 RX ADMIN — OLANZAPINE 5 MG: 5 TABLET, FILM COATED ORAL at 22:11

## 2024-04-14 RX ADMIN — HYDROXYZINE HYDROCHLORIDE 50 MG: 25 TABLET, FILM COATED ORAL at 09:04

## 2024-04-14 RX ADMIN — HYDROXYZINE HYDROCHLORIDE 50 MG: 25 TABLET, FILM COATED ORAL at 22:11

## 2024-04-14 RX ADMIN — LEVOTHYROXINE SODIUM 125 MCG: 0.07 TABLET ORAL at 05:53

## 2024-04-14 RX ADMIN — SODIUM CHLORIDE, PRESERVATIVE FREE 10 ML: 5 INJECTION INTRAVENOUS at 22:14

## 2024-04-14 RX ADMIN — QUETIAPINE FUMARATE 25 MG: 25 TABLET ORAL at 22:12

## 2024-04-14 RX ADMIN — SODIUM CHLORIDE: 9 INJECTION, SOLUTION INTRAVENOUS at 06:00

## 2024-04-14 RX ADMIN — AMITRIPTYLINE HYDROCHLORIDE 100 MG: 50 TABLET, FILM COATED ORAL at 22:13

## 2024-04-14 RX ADMIN — CLONAZEPAM 4 MG: 1 TABLET ORAL at 22:12

## 2024-04-14 RX ADMIN — SODIUM CHLORIDE, PRESERVATIVE FREE 5 ML: 5 INJECTION INTRAVENOUS at 09:05

## 2024-04-14 RX ADMIN — CEFEPIME 1000 MG: 1 INJECTION, POWDER, FOR SOLUTION INTRAMUSCULAR; INTRAVENOUS at 06:10

## 2024-04-14 RX ADMIN — HEPARIN SODIUM 5000 UNITS: 5000 INJECTION INTRAVENOUS; SUBCUTANEOUS at 22:15

## 2024-04-14 NOTE — PROGRESS NOTES
Hospitalist Progress Note   Admit Date:  2024  4:08 PM   Name:  Peter Nolasco   Age:  78 y.o.  Sex:  male  :  1945   MRN:  [id number]   Room:  [facility]/    Presenting/Chief Complaint: Fever and Tachycardia     Reason(s) for Admission: Septicemia (HCC) [A41.9]  KEVAN (acute kidney injury) (HCC) [N17.9]  Acute cystitis without hematuria [N30.00]  Sepsis (HCC) [A41.9]     Hospital Course:   Peter Nolasco is a 78 y.o. male with medical history of h/o chronic urinary retention with chronic indwelling John , right ureteral stent exchanged every 6 months- next exchange 2024,h/o rectal cancer s/p colostomy, bipolar disorder, and hypothyroidism, who presented with disorientation.  Patient was found by EMS and was noted to be febrile, tachycardic, and hypoxic.  In the ED vitals were pertinent for Tmax of 102.6, , respiratory rate 28.  UA was positive for nitrite with large amount of leukocyte esterase.  Patient met criteria for sepsis and was admitted for further management.  Urology was consulted.    Subjective & 24hr Events:   Seen and examined at bedside this morning.  No acute events overnight.  Endorses improvement in his symptoms and denies any complaints at this time.  Denies fever, chills, nausea, vomiting, shortness of breath.      Assessment & Plan:     Sepsis (HCC)  Hypoxia  Secondary to UTI  UA positive for nitrites and large amount of leukocyte esterase  Chronic urinary catheter with right ureteral stents in place  Continue with cefepime  Urine culture pending  Urology consulted  On supplemental oxygen, continue weaning as tolerated      Hypothyroidism  Continue Synthroid      Acute kidney injury superimposed on CKD (HCC)  Resolved  Creatinine: 1.8 on presentation  Improved to 1.5  Continue with gentle hydration      Bipolar disorder, unspecified (HCC)  On Remeron, Zyprexa, Elavil      Debility  PT/OT      Primary hypertension  On Coreg 6.25 mg twice daily.    Follow BP, add  calculated from the following:    Height as of this encounter: 1.727 m (5' 8\").    Weight as of this encounter: 53 kg (116 lb 12.8 oz).    Intake/Output Summary (Last 24 hours) at 4/14/2024 1018  Last data filed at 4/14/2024 0921  Gross per 24 hour   Intake 710 ml   Output 2420 ml   Net -1710 ml         Physical Exam:     General:    Well nourished, on nasal cannula  Head:  Normocephalic, atraumatic  Eyes:  Sclerae appear normal.  Pupils equally round.  ENT:  Nares appear normal.  Moist oral mucosa  Neck:  No restricted ROM.  Trachea midline   CV:   RRR.  No m/r/g.  No jugular venous distension.  Lungs:   CTAB.  No wheezing, rhonchi, or rales.  Symmetric expansion.  Abdomen:   Soft, nontender, nondistended, colostomy in place  Extremities: No cyanosis or clubbing.  No edema  Skin:     No rashes.  Normal coloration.   Warm and dry.    Neuro:  CN II-XII grossly intact.    Psych:  Normal mood and affect.      I have personally reviewed labs and tests:  Recent Labs:  Recent Results (from the past 48 hour(s))   EKG 12 Lead    Collection Time: 04/13/24  4:14 PM   Result Value Ref Range    Ventricular Rate 152 BPM    Atrial Rate 0 BPM    P-R Interval 119 ms    QRS Duration 135 ms    Q-T Interval 345 ms    QTc Calculation (Bazett) 549 ms    P Axis 0 degrees    R Axis 164 degrees    T Axis 4 degrees    Diagnosis       Wide-QRS tachycardia  Nonspecific intraventricular conduction delay     POCT Blood Gas & Electrolytes    Collection Time: 04/13/24  4:20 PM   Result Value Ref Range    POC pH 7.54 (H) 7.35 - 7.45      POC pCO2 28.2 (L) 35 - 45 MMHG    POC PO2 49 (L) 75 - 100 MMHG    POC Sodium 140 136 - 145 MMOL/L    POC Potassium 4.3 3.5 - 5.1 MMOL/L    POC Ionized Calcium 1.14 1.12 - 1.32 mmol/L    POC Glucose 186 (H) 65 - 100 MG/DL    Base Excess 1.9 mmol/L    POC HCO3 23.8 22 - 26 MMOL/L    POC TCO2 23 13 - 23 MMOL/L    POC O2 SAT 89 %    Source ARTERIAL      Site LEFT RADIAL      Prem Test Positive      DEVICE ROOM AIR    Ref Range    TSH w Free Thyroid if Abnormal 1.07 0.358 - 3.740 UIU/ML   Lactate, Sepsis    Collection Time: 04/13/24  4:46 PM   Result Value Ref Range    Lactic Acid, Sepsis 3.0 (H) 0.4 - 2.0 MMOL/L   Urinalysis w/Reflex to Micro    Collection Time: 04/13/24  5:20 PM   Result Value Ref Range    Color, UA YELLOW/STRAW      Appearance TURBID      Specific Gravity, UA 1.017 1.001 - 1.023      pH, Urine 5.5 5.0 - 9.0      Protein,  (A) NEG mg/dL    Glucose, UA Negative mg/dL    Ketones, Urine Negative NEG mg/dL    Bilirubin Urine Negative NEG      Blood, Urine LARGE (A) NEG      Urobilinogen, Urine 0.2 0.2 - 1.0 EU/dL    Nitrite, Urine Positive (A) NEG      Leukocyte Esterase, Urine LARGE (A) NEG      WBC, UA >100 0 /hpf    RBC, UA >100 0 /hpf    Epithelial Cells UA 0-3 0 /hpf    BACTERIA, URINE 4+ (H) 0 /hpf    Other observations RESULTS VERIFIED MANUALLY     Culture, Urine    Collection Time: 04/13/24  5:20 PM    Specimen: Urine, John Catheter   Result Value Ref Range    Special Requests NO SPECIAL REQUESTS      Culture        No growth after short period of incubation. Further results to follow after overnight incubation.   Lactate, Sepsis    Collection Time: 04/13/24  6:07 PM   Result Value Ref Range    Lactic Acid, Sepsis 2.4 (H) 0.4 - 2.0 MMOL/L   Basic Metabolic Panel w/ Reflex to MG    Collection Time: 04/14/24 12:02 AM   Result Value Ref Range    Sodium 141 136 - 146 mmol/L    Potassium 3.9 3.5 - 5.1 mmol/L    Chloride 110 103 - 113 mmol/L    CO2 28 21 - 32 mmol/L    Anion Gap 3 2 - 11 mmol/L    Glucose 103 (H) 65 - 100 mg/dL    BUN 42 (H) 8 - 23 MG/DL    Creatinine 1.50 0.8 - 1.5 MG/DL    Est, Glom Filt Rate 47 (L) >60 ml/min/1.73m2    Calcium 7.6 (L) 8.3 - 10.4 MG/DL   CBC with Auto Differential    Collection Time: 04/14/24 12:02 AM   Result Value Ref Range    WBC 5.3 4.3 - 11.1 K/uL    RBC 3.09 (L) 4.23 - 5.6 M/uL    Hemoglobin 8.7 (L) 13.6 - 17.2 g/dL    Hematocrit 27.8 (L) 41.1 - 50.3 %    MCV 90.0

## 2024-04-14 NOTE — PROGRESS NOTES
Bedside report received from night nurse Shobha. Assessment done as noted  Respiration even and unlabored 20/min; denies pain or nausea at present. Remains on remote tele with NSR 64 confirmed per monitor tech. Encouraged to call with needs.
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
## 2024-04-14 NOTE — PROGRESS NOTES
ACUTE PHYSICAL THERAPY GOALS:   (Developed with and agreed upon by patient and/or caregiver.)    (1.) Peter Nolasco  will move from supine to sit and sit to supine , scoot up and down, and roll side to side with SUPERVISION within 3 treatment day(s).  MET  (2.) Peter Nolasco will transfer from bed to chair and chair to bed with SUPERVISION using the least restrictive device within 3 treatment day(s).  MET  (3.) Peter Nolasco will ambulate with SUPERVISION for 500 feet with the least restrictive device within 3 treatment day(s). MET    PHYSICAL THERAPY Initial Assessment, Daily Note, Discharge, and PM  (Link to Caseload Tracking: PT Visit Days : 1  Acknowledge Orders  Time In/Out  PT Charge Capture  Rehab Caseload Tracker    Peter Nolasco is a 78 y.o. male   PRIMARY DIAGNOSIS: Sepsis (HCC)  Septicemia (HCC) [A41.9]  KEVAN (acute kidney injury) (HCC) [N17.9]  Acute cystitis without hematuria [N30.00]  Sepsis (HCC) [A41.9]       Reason for Referral: Generalized Muscle Weakness (M62.81)  Inpatient: Payor: MEDICARE / Plan: MEDICARE PART A AND B / Product Type: *No Product type* /     ASSESSMENT:     REHAB RECOMMENDATIONS:   Recommendation to date pending progress:  Setting:  No further skilled physical therapy after discharge from hospital    Equipment:    None     ASSESSMENT:   Mr. Nolasco is a 78 year old male who presents to hospital secondary to above diagnosis. Pt presents sitting on EOB eating lunch. Pt reports that he lives alone in 2 level home with level entry, uses RW to assist with amb, I with amb and ADLs PTA, no home O2. Pt presents today on RA, 96%, encouraged pt to take some deep breaths at times. This date pt performs mobility including bed mob, STS and amb 500 with RW with Supervision for safety, Pt SpO2 after act was 98%. Pt presents as functioning at his baseline, no acute PT intervention is indicated at this time. Will discharge from PT caseload. Please re-consult if any new  needs arise. Thank you. .     [facility]-PAC™ “6 Clicks” Basic Mobility Inpatient Short Form  AM-PAC Basic Mobility - Inpatient   How much help is needed turning from your back to your side while in a flat bed without using bedrails?: None  How much help is needed moving from lying on your back to sitting on the side of a flat bed without using bedrails?: None  How much help is needed moving to and from a bed to a chair?: None  How much help is needed standing up from a chair using your arms?: None  How much help is needed walking in hospital room?: None  How much help is needed climbing 3-5 steps with a railing?: A Little  AM-[facility] Inpatient Mobility Raw Score : 23  AM-PAC Inpatient T-Scale Score : 56.93  Mobility Inpatient CMS 0-100% Score: 11.2  Mobility Inpatient CMS G-Code Modifier : CI    SUBJECTIVE:   Mr. Nolasco states, \"Food is pretty good\"     Social/Functional Lives With: Alone  Type of Home: House  Home Layout: Two level  Home Access: Level entry  Bathroom Shower/Tub: Tub/Shower unit, Shower chair with back  Bathroom Toilet: Standard  Bathroom Equipment: Grab bars in shower, Grab bars around toilet  Home Equipment: Walker, rolling    OBJECTIVE:     PAIN: VITALS / O2: PRECAUTION / LINES / DRAINS:   Pre Treatment:   Pain Assessment: None - Denies Pain      Post Treatment: none stated Vitals        Oxygen   See assessment   John Catheter    RESTRICTIONS/PRECAUTIONS:                    GROSS EVALUATION:  Intact Impaired (Comments):   AROM []     PROM []    Strength []     Balance [] Sitting - Static: Good  Sitting - Dynamic: Good  Standing - Static: Fair, +  Standing - Dynamic: Fair, +   Posture [] Forward Head  Rounded Shoulders  Trunk Flexion   Sensation []     Coordination []      Tone []     Edema []    Activity Tolerance [] Patient limited by fatigue, Patient Tolerated treatment well    []      COGNITION/  PERCEPTION: Intact Impaired (Comments):   Orientation []     Vision []     Hearing []

## 2024-04-15 LAB
ANION GAP SERPL CALC-SCNC: 1 MMOL/L (ref 2–11)
BUN SERPL-MCNC: 31 MG/DL (ref 8–23)
CALCIUM SERPL-MCNC: 8.4 MG/DL (ref 8.3–10.4)
CHLORIDE SERPL-SCNC: 109 MMOL/L (ref 103–113)
CO2 SERPL-SCNC: 27 MMOL/L (ref 21–32)
CREAT SERPL-MCNC: 1.2 MG/DL (ref 0.8–1.5)
ERYTHROCYTE [DISTWIDTH] IN BLOOD BY AUTOMATED COUNT: 14.3 % (ref 11.9–14.6)
GLUCOSE SERPL-MCNC: 88 MG/DL (ref 65–100)
HCT VFR BLD AUTO: 30.3 % (ref 41.1–50.3)
HGB BLD-MCNC: 9.5 G/DL (ref 13.6–17.2)
MCH RBC QN AUTO: 27.8 PG (ref 26.1–32.9)
MCHC RBC AUTO-ENTMCNC: 31.4 G/DL (ref 31.4–35)
MCV RBC AUTO: 88.6 FL (ref 82–102)
NRBC # BLD: 0 K/UL (ref 0–0.2)
PLATELET # BLD AUTO: 189 K/UL (ref 150–450)
PMV BLD AUTO: 9 FL (ref 9.4–12.3)
POTASSIUM SERPL-SCNC: 3.7 MMOL/L (ref 3.5–5.1)
RBC # BLD AUTO: 3.42 M/UL (ref 4.23–5.6)
SODIUM SERPL-SCNC: 137 MMOL/L (ref 136–146)
WBC # BLD AUTO: 4.1 K/UL (ref 4.3–11.1)

## 2024-04-15 PROCEDURE — 2580000003 HC RX 258: Performed by: STUDENT IN AN ORGANIZED HEALTH CARE EDUCATION/TRAINING PROGRAM

## 2024-04-15 PROCEDURE — 97165 OT EVAL LOW COMPLEX 30 MIN: CPT

## 2024-04-15 PROCEDURE — 97535 SELF CARE MNGMENT TRAINING: CPT

## 2024-04-15 PROCEDURE — 6370000000 HC RX 637 (ALT 250 FOR IP): Performed by: FAMILY MEDICINE

## 2024-04-15 PROCEDURE — 36415 COLL VENOUS BLD VENIPUNCTURE: CPT

## 2024-04-15 PROCEDURE — 2580000003 HC RX 258: Performed by: FAMILY MEDICINE

## 2024-04-15 PROCEDURE — 6360000002 HC RX W HCPCS: Performed by: STUDENT IN AN ORGANIZED HEALTH CARE EDUCATION/TRAINING PROGRAM

## 2024-04-15 PROCEDURE — 1100000000 HC RM PRIVATE

## 2024-04-15 PROCEDURE — 6370000000 HC RX 637 (ALT 250 FOR IP): Performed by: STUDENT IN AN ORGANIZED HEALTH CARE EDUCATION/TRAINING PROGRAM

## 2024-04-15 PROCEDURE — 85027 COMPLETE CBC AUTOMATED: CPT

## 2024-04-15 PROCEDURE — 97530 THERAPEUTIC ACTIVITIES: CPT

## 2024-04-15 PROCEDURE — 80048 BASIC METABOLIC PNL TOTAL CA: CPT

## 2024-04-15 PROCEDURE — 6360000002 HC RX W HCPCS: Performed by: FAMILY MEDICINE

## 2024-04-15 RX ORDER — AMLODIPINE BESYLATE 5 MG/1
5 TABLET ORAL DAILY
Status: DISCONTINUED | OUTPATIENT
Start: 2024-04-16 | End: 2024-04-16 | Stop reason: HOSPADM

## 2024-04-15 RX ORDER — AMLODIPINE BESYLATE 10 MG/1
10 TABLET ORAL DAILY
Status: DISCONTINUED | OUTPATIENT
Start: 2024-04-15 | End: 2024-04-15

## 2024-04-15 RX ADMIN — HYOSCYAMINE SULFATE 0.12 MG: 0.12 TABLET ORAL; SUBLINGUAL at 20:18

## 2024-04-15 RX ADMIN — OXYBUTYNIN CHLORIDE 10 MG: 10 TABLET, EXTENDED RELEASE ORAL at 22:19

## 2024-04-15 RX ADMIN — CLONAZEPAM 4 MG: 1 TABLET ORAL at 22:19

## 2024-04-15 RX ADMIN — HEPARIN SODIUM 5000 UNITS: 5000 INJECTION INTRAVENOUS; SUBCUTANEOUS at 22:18

## 2024-04-15 RX ADMIN — HYOSCYAMINE SULFATE 0.12 MG: 0.12 TABLET ORAL; SUBLINGUAL at 02:29

## 2024-04-15 RX ADMIN — AMLODIPINE BESYLATE 10 MG: 10 TABLET ORAL at 08:43

## 2024-04-15 RX ADMIN — CARVEDILOL 6.25 MG: 3.12 TABLET, FILM COATED ORAL at 08:43

## 2024-04-15 RX ADMIN — OLANZAPINE 5 MG: 5 TABLET, FILM COATED ORAL at 22:19

## 2024-04-15 RX ADMIN — HYOSCYAMINE SULFATE 0.12 MG: 0.12 TABLET ORAL; SUBLINGUAL at 06:34

## 2024-04-15 RX ADMIN — CARVEDILOL 6.25 MG: 3.12 TABLET, FILM COATED ORAL at 17:56

## 2024-04-15 RX ADMIN — HYDROXYZINE HYDROCHLORIDE 50 MG: 25 TABLET, FILM COATED ORAL at 08:43

## 2024-04-15 RX ADMIN — MIRTAZAPINE 15 MG: 15 TABLET, FILM COATED ORAL at 22:19

## 2024-04-15 RX ADMIN — SODIUM CHLORIDE, PRESERVATIVE FREE 10 ML: 5 INJECTION INTRAVENOUS at 22:18

## 2024-04-15 RX ADMIN — HYDROXYZINE HYDROCHLORIDE 50 MG: 25 TABLET, FILM COATED ORAL at 22:19

## 2024-04-15 RX ADMIN — LEVOTHYROXINE SODIUM 125 MCG: 0.07 TABLET ORAL at 06:34

## 2024-04-15 RX ADMIN — HYOSCYAMINE SULFATE 0.12 MG: 0.12 TABLET ORAL; SUBLINGUAL at 15:54

## 2024-04-15 RX ADMIN — SODIUM CHLORIDE, PRESERVATIVE FREE 5 ML: 5 INJECTION INTRAVENOUS at 08:49

## 2024-04-15 RX ADMIN — HYOSCYAMINE SULFATE 0.12 MG: 0.12 TABLET ORAL; SUBLINGUAL at 10:28

## 2024-04-15 RX ADMIN — HEPARIN SODIUM 5000 UNITS: 5000 INJECTION INTRAVENOUS; SUBCUTANEOUS at 08:43

## 2024-04-15 RX ADMIN — QUETIAPINE FUMARATE 25 MG: 25 TABLET ORAL at 22:19

## 2024-04-15 RX ADMIN — CEFEPIME 1000 MG: 1 INJECTION, POWDER, FOR SOLUTION INTRAMUSCULAR; INTRAVENOUS at 06:42

## 2024-04-15 RX ADMIN — AMITRIPTYLINE HYDROCHLORIDE 100 MG: 50 TABLET, FILM COATED ORAL at 22:19

## 2024-04-15 ASSESSMENT — PAIN SCALES - GENERAL
PAINLEVEL_OUTOF10: 0
PAINLEVEL_OUTOF10: 0

## 2024-04-15 NOTE — PROGRESS NOTES
Upon assessment of carr, output appears to be positional, not fully draining. Repositioning of carr allows an abundance of output.  Charge RN from 6th floor came to bedside, assessed acrr, verified placement, removed tamper seal, placed new tube/carr bag to prevent dependent loop in tubing. Carr patent and draining.

## 2024-04-15 NOTE — PROGRESS NOTES
Comprehensive Nutrition Assessment    Type and Reason for Visit: Initial, Positive Nutrition Screen  Best Practice Alert for BMI <18.5    Nutrition Recommendations/Plan:   Meals and Snacks:  Diet: Continue current order  Nutrition Supplement Therapy:  Medical food supplement therapy:  Initiate Ensure Enlive twice per day (this provides 350 kcal and 20 grams protein per bottle)     Malnutrition Assessment:  Malnutrition Status: Moderate malnutrition  Context: Chronic Illness  Findings of clinical characteristics of malnutrition:   Energy Intake:  No significant decrease in energy intake (per patient report)  Weight Loss:  Unable to assess (no updated weight this admission)     Body Fat Loss:  Mild body fat loss (moderate) Orbital, Triceps, Fat Overlying Ribs, Buccal region   Muscle Mass Loss:  Severe muscle mass loss (Moderate to Severe) Clavicles (pectoralis & deltoids), Temples (temporalis), Scapula (trapezius), Hand (interosseous)  Fluid Accumulation:  Unable to assess     Strength:  Not Performed     Nutrition Assessment:  Nutrition History: Patient reports he had been home from rehab for 2-3 weeks pta. States he typically only eats 1 meal per day. He additional drinks 1 premier protein per day as these are lower in sugar. Reports at baseline he tends to avoid fat and sugars because he knows these are \"not good for him\".      Do You Have Any Cultural, Church, or Ethnic Food Preferences?: No   Weight History: OV weight hx: 3/28/24: 114#, 9/13/23: 109#, 8/7/23: 111#, 4/26/23: 117#  Unable to obtain updated weight; patient up to chair at time of RD assessment.   Weight overall stable over the past year per OV wt hx above.   Nutrition Background:       PMH: urinary retention with chronic indwelling carr, rectal cancer s/p colostomy, bipolar disorder and hypothyroidism. Admitted with sepsis secondary to UTI, hypoxia, KEVAN.   Nutrition Interval:  Patient endorses he enjoys the food at the hospital.  MIGUEL Faulkner

## 2024-04-15 NOTE — CARE COORDINATION
MSN, CM:  spoke with patient this afternoon about discharge planning.  Patient lives alone in own 2-story home with no steps for entrance.  Patient is independent with all ADL's and requires a walker for ambulation.  Patient denies any HH, rehab, palliative care or home oxygen currently.  Patient is retired and able to drive himself to all appointments.  Patient was admitted for fever 102.8, tachycardia at 148, and oxygen sats at 88% on room air.  Patient has urine cultures pending r/t chronic carr.  Patient currently on room air.  PT/OT consulted for evaluation and recommendations.  Case Management will continue to follow for any discharge needs.       04/15/24 1246   Service Assessment   Patient Orientation Alert and Oriented   Cognition Alert   History Provided By Patient   Primary Caregiver Self   Support Systems Friends/Neighbors   Patient's Healthcare Decision Maker is: Named in Scanned ACP Document   PCP Verified by CM Yes   Last Visit to PCP Within last 3 months   Prior Functional Level Independent in ADLs/IADLs   Current Functional Level Other (see comment)  (PT/OT consulted)   Can patient return to prior living arrangement Yes   Ability to make needs known: Good   Family able to assist with home care needs: Yes   Would you like for me to discuss the discharge plan with any other family members/significant others, and if so, who? No   Financial Resources Medicare  ([facility] supp)   Community Resources None   Social/Functional History   Lives With Alone   Type of Home House   Home Layout Two level   Home Access Level entry   Bathroom Shower/Tub Tub/Shower unit;Shower chair with back   Bathroom Toilet Standard   Bathroom Equipment Grab bars in shower;Grab bars around toilet   Bathroom Accessibility Accessible   Home Equipment Walker, rolling   Receives Help From Friend(s)   ADL Assistance Independent   Homemaking Assistance Independent   Homemaking Responsibilities Yes   Ambulation Assistance Independent    Transfer Assistance Independent   Active  Yes   Mode of Transportation Car   Occupation Retired   Discharge Planning   Type of Residence House   Living Arrangements Alone   Current Services Prior To Admission None   DME Ordered? No   Potential Assistance Purchasing Medications No   Type of Home Care Services None   Patient expects to be discharged to: House   One/Two Story Residence Two story   History of falls? 1

## 2024-04-15 NOTE — PROGRESS NOTES
PRN Levsin given per request and c/o bladder spasm. States carr catheter leaks with spasms, bed linens and gown changed. Assisted patient with ostomy emptying as well. Carr is not leaking at this time. Urology saw pt at bedside; continue Levsin.

## 2024-04-15 NOTE — PROGRESS NOTES
BP elevated, patient states concerns over high BP since admission. MD rounding at bedside and requested orders for Norvasc daily. Patient verbalized understanding of educated on new BP medication and provided with education pamphlet.

## 2024-04-15 NOTE — PROGRESS NOTES
PRN Levsin given for c/o bladder spasms. Catheter is not leaking. Assisted patient with ostomy wafer and bag change.

## 2024-04-15 NOTE — ACP (ADVANCE CARE PLANNING)
Advance Care Planning     General Advance Care Planning (ACP) Conversation    Date of Conversation: 4/13/2024  Conducted with: Patient with Decision Making Capacity    Healthcare Decision Maker:    Primary Decision Maker: MaxTorie Benitez - Niece/Nephew - [phone]    Secondary Decision Maker: Manju Guillermo - Matilda - [phone]  Click here to complete Healthcare Decision Makers including selection of the Healthcare Decision Maker Relationship (ie \"Primary\").  Today we documented Decision Maker(s) consistent with Legal Next of Kin hierarchy.    Content/Action Overview:  Has ACP document(s) on file - reflects the patient's care preferences  Reviewed DNR/DNI and patient elects Full Code (Attempt Resuscitation)        Length of Voluntary ACP Conversation in minutes:  <16 minutes (Non-Billable)    Fauzia Puri RN

## 2024-04-15 NOTE — THERAPY EVALUATION
ACUTE OCCUPATIONAL THERAPY GOALS:   (Developed with and agreed upon by patient and/or caregiver.)  1. Patient will complete lower body bathing and dressing with MODIFIED INDEPENDENCE and adaptive equipment as needed.     2. Patient will complete toilet transfers and toileting with MODIFIED INDEPENDENCE.  3. Patient will complete grooming ADL tasks at standing level with MODIFIED INDEPENDENCE.  4. Patient will tolerate 30 minutes of OT treatment with 1-2 rest breaks to increase activity tolerance for ADLs.   5. Patient will complete functional transfers with MODIFIED INDEPENDENCE and adaptive equipment as needed.   6. Patient will tolerate 10 minutes BUE exercises to increase strength for safe, functional transfers.     Timeframe: 7 visits        OCCUPATIONAL THERAPY Initial Assessment, Daily Note, and AM       OT Visit Days: 1  Acknowledge Orders  Time  OT Charge Capture  Rehab Caseload Tracker      Peter Nolasco is a 78 y.o. male   PRIMARY DIAGNOSIS: Sepsis (HCC)  Septicemia (HCC) [A41.9]  KEVAN (acute kidney injury) (HCC) [N17.9]  Acute cystitis without hematuria [N30.00]  Sepsis (HCC) [A41.9]       Reason for Referral: Generalized Muscle Weakness (M62.81)  Other lack of cordination (R27.8)  Difficulty in walking, Not elsewhere classified (R26.2)  Other abnormalities of gait and mobility (R26.89)  History of falling (Z91.81)  Inpatient: Payor: MEDICARE / Plan: MEDICARE PART A AND B / Product Type: *No Product type* /     ASSESSMENT:     REHAB RECOMMENDATIONS:   Recommendation to date pending progress:  Setting:  Home Health Therapy    Equipment:    To Be Determined--pt has rolling walker and rollator at home     ASSESSMENT:  Mr. Nolasco is a 78 y.o. male admitted with sepsis secondary to UTI. PMHx of chronic urinary retention with chronic indwelling John, right ureteral stent, hx rectal cancer s/p colostomy, bipolar disorder, and hypothyroidism.    Today, pt is received supine in bed, agreeable to  participate in the session. Denies SOB, lightheadedness, and pain throughout the session. Per patient report, he lives alone in a two-level home with bedroom/bathroom located upstairs and level entry into the home. He is independent with all ADLs with rolling walker and rollator for mobility at baseline. At baseline, he has a  who assists with IADLs. He is an active . Reports 2 falls within the past six months. This date, pt performed bed mobility transfers/supine to sit edge of bed with supervision. Pt presented with good sitting balance edge of bed requiring supervision for safety. Pt then performed sit<>stand transfers, bed to chair transfer, and functional mobility for ADLs using rolling walker with standby assist-minimal assist (refer to chart below). Pt completed standing grooming tasks at sink with standby assist-contact guard assistance for dynamic balance. Pt performed all activity on room air with saturations remaining within normal limits throughout. Pt positioned comfortably sitting up in chair at end of session with all needs within reach. RN notified of pt's performance.    Pt presents as functioning below his baseline with overall deficits in ADL performance, functional transfers, household mobility for ADLs, strength, balance, and activity tolerance. Pt will benefit from skilled OT services at this time in order to address functional deficits and OT goals stated above. Recommend HHOT at d/c. Will continue to monitor and advance as indicated.      [facility] AM-PAC™ “6 Clicks” Daily Activity Inpatient Short Form:    AM-PAC Daily Activity - Inpatient   How much help is needed for putting on and taking off regular lower body clothing?: A Little  How much help is needed for bathing (which includes washing, rinsing, drying)?: A Lot  How much help is needed for toileting (which includes using toilet, bedpan, or urinal)?: A Little  How much help is needed for putting on and taking

## 2024-04-15 NOTE — PROGRESS NOTES
Hospitalist Progress Note   Admit Date:  2024  4:08 PM   Name:  Peter Nolasco   Age:  78 y.o.  Sex:  male  :  1945   MRN:  [id number]   Room:  [facility]/    Presenting/Chief Complaint: Fever and Tachycardia     Reason(s) for Admission: Septicemia (HCC) [A41.9]  KEVAN (acute kidney injury) (HCC) [N17.9]  Acute cystitis without hematuria [N30.00]  Sepsis (HCC) [A41.9]     Hospital Course:   Peter Nolasco is a 78 y.o. male with medical history of h/o chronic urinary retention with chronic indwelling John , right ureteral stent exchanged every 6 months- next exchange 2024,h/o rectal cancer s/p colostomy, bipolar disorder, and hypothyroidism, who presented with disorientation.  Patient was found by EMS and was noted to be febrile, tachycardic, and hypoxic.  In the ED vitals were pertinent for Tmax of 102.6, , respiratory rate 28.  UA was positive for nitrite with large amount of leukocyte esterase.  Patient met criteria for sepsis and was admitted for further management.  Urology was consulted.    Subjective & 24hr Events:   Seen and examined at bedside this morning.  No acute events overnight.  Patient was concerned about elevation in his blood pressure, but denies any other complaints.  Awaiting final urine culture results.  Plans to discharge home tomorrow.    Assessment & Plan:     Sepsis (HCC)  Hypoxia  Secondary to UTI  UA positive for nitrites and large amount of leukocyte esterase  Chronic urinary catheter with right ureteral stents in place  Continue with cefepime  Urine culture pending  Urology consulted: plan for stent exchange in 2024  On supplemental oxygen, continue weaning as tolerated      Hypothyroidism  Continue Synthroid      Acute kidney injury superimposed on CKD (HCC)  Resolved  Creatinine: 1.8>>1.5>>1.20   S/p IVF       Bipolar disorder, unspecified (HCC)  On Remeron, Zyprexa, Elavil      Debility  PT/OT      Primary hypertension  BP uncontrolled   on Coreg  27.8 (L) 41.1 - 50.3 %    MCV 90.0 82 - 102 FL    MCH 28.2 26.1 - 32.9 PG    MCHC 31.3 (L) 31.4 - 35.0 g/dL    RDW 14.5 11.9 - 14.6 %    Platelets 163 150 - 450 K/uL    MPV 8.5 (L) 9.4 - 12.3 FL    nRBC 0.00 0.0 - 0.2 K/uL    Differential Type AUTOMATED      Neutrophils % 80 (H) 43 - 78 %    Lymphocytes % 7 (L) 13 - 44 %    Monocytes % 12 4.0 - 12.0 %    Eosinophils % 1 0.5 - 7.8 %    Basophils % 0 0.0 - 2.0 %    Immature Granulocytes % 0 0.0 - 5.0 %    Neutrophils Absolute 4.2 1.7 - 8.2 K/UL    Lymphocytes Absolute 0.4 (L) 0.5 - 4.6 K/UL    Monocytes Absolute 0.6 0.1 - 1.3 K/UL    Eosinophils Absolute 0.0 0.0 - 0.8 K/UL    Basophils Absolute 0.0 0.0 - 0.2 K/UL    Immature Granulocytes Absolute 0.0 0.0 - 0.5 K/UL   Lactic Acid    Collection Time: 04/14/24 12:02 AM   Result Value Ref Range    Lactic Acid, Plasma 1.0 0.4 - 2.0 MMOL/L   Magnesium    Collection Time: 04/14/24 12:02 AM   Result Value Ref Range    Magnesium 1.9 1.8 - 2.4 mg/dL       No results for input(s): \"COVID19\" in the last 72 hours.    Current Meds:  Current Facility-Administered Medications   Medication Dose Route Frequency    amitriptyline (ELAVIL) tablet 100 mg  100 mg Oral Nightly    carvedilol (COREG) tablet 6.25 mg  6.25 mg Oral BID WC    clonazePAM (KLONOPIN) tablet 4 mg  4 mg Oral Nightly PRN    hydrOXYzine HCl (ATARAX) tablet 50 mg  50 mg Oral BID    hyoscyamine (LEVSIN/SL) sublingual tablet 0.125 mg  0.125 mg SubLINGual Q4H PRN    levothyroxine (SYNTHROID) tablet 125 mcg  125 mcg Oral Daily    mirtazapine (REMERON) tablet 15 mg  15 mg Oral Nightly    OLANZapine (ZYPREXA) tablet 5 mg  5 mg Oral Nightly    oxyBUTYnin (DITROPAN-XL) extended release tablet 10 mg  10 mg Oral Nightly    QUEtiapine (SEROQUEL) tablet 25 mg  25 mg Oral BID PRN    sodium chloride flush 0.9 % injection 5-40 mL  5-40 mL IntraVENous 2 times per day    sodium chloride flush 0.9 % injection 5-40 mL  5-40 mL IntraVENous PRN    0.9 % sodium chloride infusion   IntraVENous

## 2024-04-15 NOTE — PROGRESS NOTES
Admit Date: 4/13/2024      Subjective:     Peter Nolasco is sitting in chair. Carr draining well. Complains of bladder spasms and urine leakage around carr during spasm.    Objective:     Patient Vitals for the past 8 hrs:   BP Temp Temp src Pulse Resp SpO2 Height   04/15/24 1054 -- -- -- -- -- -- 1.727 m (5' 7.99\")   04/15/24 1035 133/73 97.9 °F (36.6 °C) Axillary 70 18 98 % --   04/15/24 0945 (!) 100/59 -- -- 81 -- -- --   04/15/24 0730 (!) 167/83 -- -- 76 17 94 % --     No intake/output data recorded.  04/13 1901 - 04/15 0700  In: 1065 [P.O.:1065]  Out: 5170 [Urine:5170]    Physical Exam:  GENERAL ASSESSMENT: alert, oriented to person, place and time, no acute distress and no anxiety, depression or agitation  Chest: normal work of breathing  CVS exam: normal rate, regular rhythm, normal S1, S2, no murmurs, rubs, clicks or gallops.  ABDOMEN: not done  Neurological exam reveals alert, oriented, normal speech, no focal findings or movement disorder noted.  FEMALE GENITOURINARY EXAM: not done  MALE GENITAL EXAM: not done    Data Review   Recent Results (from the past 24 hour(s))   Basic Metabolic Panel w/ Reflex to MG    Collection Time: 04/15/24  3:21 AM   Result Value Ref Range    Sodium 137 136 - 146 mmol/L    Potassium 3.7 3.5 - 5.1 mmol/L    Chloride 109 103 - 113 mmol/L    CO2 27 21 - 32 mmol/L    Anion Gap 1 (L) 2 - 11 mmol/L    Glucose 88 65 - 100 mg/dL    BUN 31 (H) 8 - 23 MG/DL    Creatinine 1.20 0.8 - 1.5 MG/DL    Est, Glom Filt Rate 61 >60 ml/min/1.73m2    Calcium 8.4 8.3 - 10.4 MG/DL   CBC    Collection Time: 04/15/24  3:21 AM   Result Value Ref Range    WBC 4.1 (L) 4.3 - 11.1 K/uL    RBC 3.42 (L) 4.23 - 5.6 M/uL    Hemoglobin 9.5 (L) 13.6 - 17.2 g/dL    Hematocrit 30.3 (L) 41.1 - 50.3 %    MCV 88.6 82 - 102 FL    MCH 27.8 26.1 - 32.9 PG    MCHC 31.4 31.4 - 35.0 g/dL    RDW 14.3 11.9 - 14.6 %    Platelets 189 150 - 450 K/uL    MPV 9.0 (L) 9.4 - 12.3 FL    nRBC 0.00 0.0 - 0.2 K/uL        Assessment:     Principal Problem:    Sepsis (HCC)  Active Problems:    Hypoxemia    Hypothyroidism    Colostomy care (HCC)    Chronic indwelling Carr catheter    Acute kidney injury superimposed on CKD (HCC)    Bipolar disorder, unspecified ([facility])    Debility    Primary hypertension  Resolved Problems:    * No resolved hospital problems. *    Carr draining, good UOP. Cr. 1.2. HGB 9.5. WBC 4.1. elevated BP.    Pre-Op Diagnosis: * No surgery found *    Post-Op Diagnosis:  * No surgery found *    Procedures: * No surgery found *      Plan:   Medical management per primary team.  Chronic carr. Recommend to exchange 4-6 weeks.  Stent exchange schedule for 6/2024.  Continue levsin for bladder spasms.  Will sign off. Call with questions.      Signed By: SHANTI Noonan     April 15, 2024      [facility]    I have reviewed the above note.  I agree with the HPI, exam, assessment and plan as outlined by the nurse practitioner.    Jaime Alexander M.D.    [facility]  [address] [address]  Phone: [phone]  Fax: [phone]

## 2024-04-16 ENCOUNTER — HOME HEALTH ADMISSION (OUTPATIENT)
Dept: HOME HEALTH SERVICES | Facility: HOME HEALTH | Age: 79
End: 2024-04-16

## 2024-04-16 VITALS
DIASTOLIC BLOOD PRESSURE: 85 MMHG | OXYGEN SATURATION: 95 % | SYSTOLIC BLOOD PRESSURE: 128 MMHG | HEIGHT: 68 IN | WEIGHT: 116.8 LBS | RESPIRATION RATE: 16 BRPM | HEART RATE: 75 BPM | TEMPERATURE: 98.1 F | BODY MASS INDEX: 17.7 KG/M2

## 2024-04-16 LAB
ANION GAP SERPL CALC-SCNC: 2 MMOL/L (ref 2–11)
BACTERIA SPEC CULT: ABNORMAL
BACTERIA SPEC CULT: ABNORMAL
BUN SERPL-MCNC: 28 MG/DL (ref 8–23)
CALCIUM SERPL-MCNC: 8.6 MG/DL (ref 8.3–10.4)
CHLORIDE SERPL-SCNC: 104 MMOL/L (ref 103–113)
CO2 SERPL-SCNC: 31 MMOL/L (ref 21–32)
CREAT SERPL-MCNC: 1.5 MG/DL (ref 0.8–1.5)
ERYTHROCYTE [DISTWIDTH] IN BLOOD BY AUTOMATED COUNT: 14.4 % (ref 11.9–14.6)
GLUCOSE SERPL-MCNC: 84 MG/DL (ref 65–100)
HCT VFR BLD AUTO: 32.9 % (ref 41.1–50.3)
HGB BLD-MCNC: 10.4 G/DL (ref 13.6–17.2)
MCH RBC QN AUTO: 27.9 PG (ref 26.1–32.9)
MCHC RBC AUTO-ENTMCNC: 31.6 G/DL (ref 31.4–35)
MCV RBC AUTO: 88.2 FL (ref 82–102)
NRBC # BLD: 0 K/UL (ref 0–0.2)
PLATELET # BLD AUTO: 225 K/UL (ref 150–450)
PMV BLD AUTO: 9.1 FL (ref 9.4–12.3)
POTASSIUM SERPL-SCNC: 4 MMOL/L (ref 3.5–5.1)
RBC # BLD AUTO: 3.73 M/UL (ref 4.23–5.6)
SERVICE CMNT-IMP: ABNORMAL
SODIUM SERPL-SCNC: 137 MMOL/L (ref 136–146)
WBC # BLD AUTO: 4.5 K/UL (ref 4.3–11.1)

## 2024-04-16 PROCEDURE — 6370000000 HC RX 637 (ALT 250 FOR IP): Performed by: STUDENT IN AN ORGANIZED HEALTH CARE EDUCATION/TRAINING PROGRAM

## 2024-04-16 PROCEDURE — 2580000003 HC RX 258: Performed by: STUDENT IN AN ORGANIZED HEALTH CARE EDUCATION/TRAINING PROGRAM

## 2024-04-16 PROCEDURE — 2580000003 HC RX 258: Performed by: FAMILY MEDICINE

## 2024-04-16 PROCEDURE — 85027 COMPLETE CBC AUTOMATED: CPT

## 2024-04-16 PROCEDURE — 6360000002 HC RX W HCPCS: Performed by: STUDENT IN AN ORGANIZED HEALTH CARE EDUCATION/TRAINING PROGRAM

## 2024-04-16 PROCEDURE — 80048 BASIC METABOLIC PNL TOTAL CA: CPT

## 2024-04-16 PROCEDURE — 6370000000 HC RX 637 (ALT 250 FOR IP): Performed by: FAMILY MEDICINE

## 2024-04-16 PROCEDURE — 6360000002 HC RX W HCPCS: Performed by: FAMILY MEDICINE

## 2024-04-16 PROCEDURE — 36415 COLL VENOUS BLD VENIPUNCTURE: CPT

## 2024-04-16 RX ORDER — CIPROFLOXACIN 500 MG/1
500 TABLET, FILM COATED ORAL 2 TIMES DAILY
Qty: 14 TABLET | Refills: 0 | Status: SHIPPED | OUTPATIENT
Start: 2024-04-16 | End: 2024-04-23

## 2024-04-16 RX ADMIN — HYOSCYAMINE SULFATE 0.12 MG: 0.12 TABLET ORAL; SUBLINGUAL at 04:05

## 2024-04-16 RX ADMIN — CARVEDILOL 6.25 MG: 3.12 TABLET, FILM COATED ORAL at 08:13

## 2024-04-16 RX ADMIN — HYDROXYZINE HYDROCHLORIDE 50 MG: 25 TABLET, FILM COATED ORAL at 08:13

## 2024-04-16 RX ADMIN — HEPARIN SODIUM 5000 UNITS: 5000 INJECTION INTRAVENOUS; SUBCUTANEOUS at 08:12

## 2024-04-16 RX ADMIN — CEFEPIME 2000 MG: 2 INJECTION, POWDER, FOR SOLUTION INTRAVENOUS at 06:02

## 2024-04-16 RX ADMIN — SODIUM CHLORIDE, PRESERVATIVE FREE 10 ML: 5 INJECTION INTRAVENOUS at 08:13

## 2024-04-16 RX ADMIN — AMLODIPINE BESYLATE 5 MG: 5 TABLET ORAL at 08:13

## 2024-04-16 RX ADMIN — LEVOTHYROXINE SODIUM 125 MCG: 0.07 TABLET ORAL at 06:02

## 2024-04-16 NOTE — PROGRESS NOTES
DC instructions given to pt, pt being DC home to self care with follow up appts. Pt medications, appt and instructions given and understood. Prescription sent into pharmacy. Pt awaiting ride and then will be DC home with friend

## 2024-04-16 NOTE — PROGRESS NOTES
Pt resting in bed with eyes closed, awakens when spoken to. Pt oriented times 3 at this time. Pt on RA, denies pain or distress at this time. Pt has colostomy to left lower abd, with soft brown stool noted. Pt encouraged to call for assistance if needed call light in reach, bed alarm on. Will monitor.

## 2024-04-16 NOTE — DISCHARGE SUMMARY
Hospitalist Discharge Summary   Admit Date:  2024  4:08 PM   DC Note date: 2024  Name:  Peter Nolasco   Age:  78 y.o.  Sex:  male  :  1945   MRN:  [id number]   Room:  [facility]  PCP:  Bucky Armenta DO    Presenting Complaint: Fever and Tachycardia     Initial Admission Diagnosis: Septicemia (HCC) [A41.9]  KEVAN (acute kidney injury) (HCC) [N17.9]  Acute cystitis without hematuria [N30.00]  Sepsis (HCC) [A41.9]     Problem List for this Hospitalization (present on admission):    Principal Problem:    Sepsis (HCC)  Active Problems:    Hypoxemia    Hypothyroidism    Colostomy care (HCC)    Chronic indwelling John catheter    Acute kidney injury superimposed on CKD (HCC)    Bipolar disorder, unspecified (HCC)    Debility    Primary hypertension  Resolved Problems:    * No resolved hospital problems. *      Hospital Course:  Peter Nolasco is a 78 y.o. male with medical history of h/o chronic urinary retention with chronic indwelling John , right ureteral stent exchanged every 6 months- next exchange 2024,h/o rectal cancer s/p colostomy, bipolar disorder, and hypothyroidism, who presented with disorientation.  Patient was found by EMS and was noted to be febrile, tachycardic, and hypoxic.  In the ED vitals were pertinent for Tmax of 102.6, , respiratory rate 28.  UA was positive for nitrite with large amount of leukocyte esterase.  Patient met criteria for sepsis and was admitted for further management.  He was started on antibiotics.  Urology was consulted and recommended John exchange in 4 to 6 weeks, with continued stent exchange scheduled for 2024.  Patient was noted to have complete symptom resolution and is hemodynamically stable.  He will be discharged home on ciprofloxacin 500 mg twice daily for 7 days.  Encouraged to follow-up with PCP and urology upon discharge.  All questions answered and addressed.    Disposition: Home with Home Health  Diet: ADULT DIET;  Regular  ADULT ORAL NUTRITION SUPPLEMENT; Breakfast, Lunch; Standard High Calorie/High Protein Oral Supplement  Code Status: Full Code    Follow Ups:  Follow-up Information       Follow up With Specialties Details Why Contact Bucky Herrera,  Internal Medicine Call in 1 week(s)  [address]  [place] [address] [phone]      [facility]  Call in 1 week(s)  [address]  [address]  [facility] [address] [phone]            Time spent in patient discharge and coordination 35 minutes.        Follow up labs/diagnostics (ultimately defer to outpatient provider):      Plan was discussed with Patient.  All questions answered.  Patient was stable at time of discharge.  Instructions given to call a physician or return if any concerns.    Pending Labs       Order Current Status    Blood Culture 1 Preliminary result    Blood Culture 2 Preliminary result            Current Discharge Medication List        START taking these medications    Details   ciprofloxacin (CIPRO) 500 MG tablet Take 1 tablet by mouth 2 times daily for 7 days  Qty: 14 tablet, Refills: 0           CONTINUE these medications which have NOT CHANGED    Details   oxyBUTYnin (DITROPAN XL) 10 MG extended release tablet Take 1 tablet by mouth daily  Qty: 90 tablet, Refills: 3    Associated Diagnoses: Bladder spasms; Urinary retention      Hyoscyamine Sulfate SL (LEVSIN/SL) 0.125 MG SUBL Place 1 tablet under the tongue every 4 hours as needed (bladder spasms)  Qty: 60 each, Refills: 2    Associated Diagnoses: Urinary retention      solifenacin (VESICARE) 5 MG tablet Take 1 tablet by mouth daily  Qty: 90 tablet, Refills: 3      mirtazapine (REMERON) 15 MG tablet Take 1 tablet by mouth nightly  Qty: 90 tablet, Refills: 3    Associated Diagnoses: Depression, unspecified depression type; Appetite loss; Moderate episode of recurrent major depressive disorder (HCC); Anemia, unspecified type; Vitamin  0.5 ug/mL Sensitive      levofloxacin 2 ug/mL Sensitive      meropenem 1 ug/mL Sensitive      piperacillin-tazobactam 16/4 ug/mL Sensitive      tobramycin <=2 ug/mL Sensitive                           Blood Culture 1 [[id number]] Collected: 04/13/24 1634    Order Status: Completed Specimen: Blood Updated: 04/16/24 8639     Special Requests --        RIGHT  FOREARM       Culture NO GROWTH 3 DAYS               All Labs from Last 24 Hrs:  Recent Results (from the past 24 hour(s))   Basic Metabolic Panel w/ Reflex to MG    Collection Time: 04/16/24  3:56 AM   Result Value Ref Range    Sodium 137 136 - 146 mmol/L    Potassium 4.0 3.5 - 5.1 mmol/L    Chloride 104 103 - 113 mmol/L    CO2 31 21 - 32 mmol/L    Anion Gap 2 2 - 11 mmol/L    Glucose 84 65 - 100 mg/dL    BUN 28 (H) 8 - 23 MG/DL    Creatinine 1.50 0.8 - 1.5 MG/DL    Est, Glom Filt Rate 47 (L) >60 ml/min/1.73m2    Calcium 8.6 8.3 - 10.4 MG/DL   CBC    Collection Time: 04/16/24  3:56 AM   Result Value Ref Range    WBC 4.5 4.3 - 11.1 K/uL    RBC 3.73 (L) 4.23 - 5.6 M/uL    Hemoglobin 10.4 (L) 13.6 - 17.2 g/dL    Hematocrit 32.9 (L) 41.1 - 50.3 %    MCV 88.2 82 - 102 FL    MCH 27.9 26.1 - 32.9 PG    MCHC 31.6 31.4 - 35.0 g/dL    RDW 14.4 11.9 - 14.6 %    Platelets 225 150 - 450 K/uL    MPV 9.1 (L) 9.4 - 12.3 FL    nRBC 0.00 0.0 - 0.2 K/uL       No results for input(s): \"COVID19\" in the last 72 hours.    Recent Vital Data:  Patient Vitals for the past 24 hrs:   Temp Pulse Resp BP SpO2   04/16/24 0746 98.1 °F (36.7 °C) 75 16 128/85 95 %   04/16/24 0345 97.9 °F (36.6 °C) 81 19 137/85 93 %   04/15/24 2315 97.9 °F (36.6 °C) 66 19 (!) 146/86 100 %   04/15/24 1954 -- 74 -- -- --   04/15/24 1907 97.3 °F (36.3 °C) 57 19 122/78 92 %   04/15/24 1510 98 °F (36.7 °C) 79 18 128/82 99 %       Oxygen Therapy  SpO2: 95 %  Pulse via Oximetry: 78 beats per minute  O2 Device: None (Room air)  O2 Flow Rate (L/min): 2 L/min    Estimated body mass index is 17.76 kg/m² as calculated from

## 2024-04-16 NOTE — PROGRESS NOTES
PRN Levsin given for bladder spasms, per pt request. Catheter does not appear to be leaking at this time. Pt with no complains of leakage overnight thus far. Care ongoing.

## 2024-04-16 NOTE — DISCHARGE INSTRUCTIONS
DISCHARGE SUMMARY from Nurse    PATIENT INSTRUCTIONS:    After general anesthesia or intravenous sedation, for 24 hours or while taking prescription Narcotics:  Limit your activities  Do not drive and operate hazardous machinery  Do not make important personal or business decisions  Do  not drink alcoholic beverages  If you have not urinated within 8 hours after discharge, please contact your surgeon on call.    Report the following to your surgeon:  Excessive pain, swelling, redness or odor of or around the surgical area  Temperature over 100.5  Nausea and vomiting lasting longer than 4 hours or if unable to take medications  Any signs of decreased circulation or nerve impairment to extremity: change in color, persistent  numbness, tingling, coldness or increase pain  Any questions    What to do at Home:  Recommended activity: activity as tolerated    If you experience any of the following symptoms shortness of breath not relieved by rest, pain not relieved by medications, chest pain or pressure, temperature greater than 101, nausea, vomiting, diarrhea, or increase in weakness fatigue or swelling please follow up with MD.    *  Please give a list of your current medications to your Primary Care Provider.    *  Please update this list whenever your medications are discontinued, doses are      changed, or new medications (including over-the-counter products) are added.    *  Please carry medication information at all times in case of emergency situations.    These are general instructions for a healthy lifestyle:    No smoking/ No tobacco products/ Avoid exposure to second hand smoke  Surgeon General's Warning:  Quitting smoking now greatly reduces serious risk to your health.    Obesity, smoking, and sedentary lifestyle greatly increases your risk for illness    A healthy diet, regular physical exercise & weight monitoring are important for maintaining a healthy lifestyle    You may be retaining fluid if you have  a history of heart failure or if you experience any of the following symptoms:  Weight gain of 3 pounds or more overnight or 5 pounds in a week, increased swelling in our hands or feet or shortness of breath while lying flat in bed.  Please call your doctor as soon as you notice any of these symptoms; do not wait until your next office visit.        The discharge information has been reviewed with the patient.  The patient verbalized understanding.  Discharge medications reviewed with the patient and appropriate educational materials and side effects teaching were provided.  ___________________________________________________________________________________________________________________________________

## 2024-04-16 NOTE — PROGRESS NOTES
PRN Levsin given for bladder spasms, per pt request. Catheter does not appear to be leaking at this time. Care ongoing.

## 2024-04-16 NOTE — PLAN OF CARE
Problem: Safety - Adult  Goal: Free from fall injury  4/16/2024 0853 by Brandi Giles, RN  Outcome: Progressing  4/16/2024 0431 by Louis Brice, RN  Outcome: Progressing

## 2024-04-16 NOTE — CARE COORDINATION
MSN, ADRIANA:  patient to be discharged home to day with [facility] services ordered.  Patient and family agree with this discharge plan.  Patient has met all milestones for this admission.  Family to transport patient home.       04/16/24 7285   Service Assessment   Patient Orientation Alert and Oriented   Cognition Alert   Services At/After Discharge   Transition of Care Consult (CM Consult) Home Health  ([facility])   Internal Home Health Yes   Services At/After Discharge OT;Nursing services   Mode of Transport at Discharge Other (see comment)  (family to transport)   Confirm Follow Up Transport Self   Condition of Participation: Discharge Planning   The Plan for Transition of Care is related to the following treatment goals: Home Health   The Patient and/or Patient Representative was provided with a Choice of Provider? Patient   The Patient and/Or Patient Representative agree with the Discharge Plan? Yes   Freedom of Choice list was provided with basic dialogue that supports the patient's individualized plan of care/goals, treatment preferences, and shares the quality data associated with the providers?  Yes

## 2024-04-16 NOTE — PLAN OF CARE
Problem: Discharge Planning  Goal: Discharge to home or other facility with appropriate resources  4/16/2024 1006 by Brandi Giles RN  Outcome: Adequate for Discharge  4/16/2024 0431 by Louis Brice RN  Outcome: Progressing     Problem: Safety - Adult  Goal: Free from fall injury  4/16/2024 1006 by Brandi Giles, RN  Outcome: Adequate for Discharge  4/16/2024 0853 by Brandi Giles RN  Outcome: Progressing  4/16/2024 0431 by Louis Brice RN  Outcome: Progressing     Problem: Skin/Tissue Integrity  Goal: Absence of new skin breakdown  Description: 1.  Monitor for areas of redness and/or skin breakdown  2.  Assess vascular access sites hourly  3.  Every 4-6 hours minimum:  Change oxygen saturation probe site  4.  Every 4-6 hours:  If on nasal continuous positive airway pressure, respiratory therapy assess nares and determine need for appliance change or resting period.  Outcome: Adequate for Discharge

## 2024-04-17 ENCOUNTER — TELEPHONE (OUTPATIENT)
Dept: HOME HEALTH SERVICES | Facility: HOME HEALTH | Age: 79
End: 2024-04-17

## 2024-04-17 ENCOUNTER — HOME CARE VISIT (OUTPATIENT)
Dept: HOME HEALTH SERVICES | Facility: HOME HEALTH | Age: 79
End: 2024-04-17

## 2024-04-17 ENCOUNTER — TELEPHONE (OUTPATIENT)
Dept: INTERNAL MEDICINE CLINIC | Facility: CLINIC | Age: 79
End: 2024-04-17

## 2024-04-17 ENCOUNTER — TELEPHONE (OUTPATIENT)
Dept: UROLOGY | Age: 79
End: 2024-04-17

## 2024-04-17 ENCOUNTER — HOSPITAL ENCOUNTER (EMERGENCY)
Age: 79
Discharge: HOME | End: 2024-04-17
Attending: EMERGENCY MEDICINE
Payer: MEDICARE

## 2024-04-17 VITALS
HEART RATE: 78 BPM | BODY MASS INDEX: 17.03 KG/M2 | DIASTOLIC BLOOD PRESSURE: 81 MMHG | RESPIRATION RATE: 16 BRPM | SYSTOLIC BLOOD PRESSURE: 158 MMHG | TEMPERATURE: 97.6 F | WEIGHT: 115 LBS | OXYGEN SATURATION: 99 % | HEIGHT: 69 IN

## 2024-04-17 DIAGNOSIS — T83.9XXA FOLEY CATHETER PROBLEM, INITIAL ENCOUNTER (HCC): Primary | ICD-10-CM

## 2024-04-17 PROCEDURE — 99283 EMERGENCY DEPT VISIT LOW MDM: CPT

## 2024-04-17 ASSESSMENT — PAIN SCALES - GENERAL: PAINLEVEL_OUTOF10: 0

## 2024-04-17 ASSESSMENT — PAIN - FUNCTIONAL ASSESSMENT: PAIN_FUNCTIONAL_ASSESSMENT: 0-10

## 2024-04-17 NOTE — ED PROVIDER NOTES
HERNIA REPAIR  3/2015, 2016    IR ABSCESS EVAL THRU EXISTING CATH  2021    IR ABSCESS EVAL THRU EXISTING CATH  2021    IR ABSCESS EVAL THRU EXISTING CATH  2021    IR NEPHROSTOGRAM EXISTING ACCESS  12/10/2021    IR NEPHROSTOGRAM EXISTING ACCESS  10/4/2021    IR NEPHROSTOMY EXCHANGE CATHETER  2021    IR NEPHROSTOMY EXCHANGE CATHETER  11/3/2021    IR NEPHROSTOMY EXCHANGE CATHETER  10/28/2021    IR NEPHROSTOMY PERCUTANEOUS LEFT  2021    IR NEPHROSTOMY PERCUTANEOUS LEFT  2021    IR NEPHROSTOMY PERCUTANEOUS LEFT 2021 [facility]    IR REPLCE T CVC WO PORT SAME ACC  2019    IR TUBE CHANGE  2021    IR TUBE CHANGE  2021    IR TUBE CHANGE  2021    IR TUBE CHANGE  2021    IR TUNNELED CATHETER PLACEMENT GREATER THAN 5 YEARS  2019    IR TUNNELED CATHETER PLACEMENT GREATER THAN 5 YEARS  2019    IR TUNNELED CATHETER PLACEMENT GREATER THAN 5 YEARS 2019 [facility]    OTHER SURGICAL HISTORY Bilateral     cataracts  2017    OTHER SURGICAL HISTORY  10/7/2013    Roberto---endorectal us    POLYPECTOMY      TOTAL COLECTOMY  2013    Roberto--lap LAR with coloproctostomy, mobilization splenic flexure, diverting loop ileostomy    TOTAL COLECTOMY Right 2014    for leak    VASCULAR SURGERY Left     single lumen port/subsequently removed        Social History     Socioeconomic History    Marital status: Single   Tobacco Use    Smoking status: Former     Current packs/day: 0.00     Average packs/day: 1 pack/day for 10.0 years (10.0 ttl pk-yrs)     Types: Cigarettes     Start date: 1953     Quit date: 1963     Years since quittin.4     Passive exposure: Current    Smokeless tobacco: Never   Substance and Sexual Activity    Alcohol use: Not Currently     Alcohol/week: 11.7 standard drinks of alcohol    Drug use: No     Social Determinants of Health     Financial Resource Strain: Low Risk  (2023)    Overall Financial

## 2024-04-17 NOTE — TELEPHONE ENCOUNTER
Harriett with [facility] called and states that she is needing to speak to you in regards to the patients living condition, his safety and non compliance issues. Harriett states that she went out to see the patient today and did not admit him to home health due to the issues that the patient is having. Harriett states that the patient has ask to be referred to [facility]. Harriett states that [facility] has discharged the patient from there home health care. Harriett would like to know if someone would give her a call back in regards to what is going on with the patient. Please Advise.

## 2024-04-17 NOTE — TELEPHONE ENCOUNTER
Pt called because his cath is leaking per taya he was added to the cath schedule for tomorrow at 845 in [place]. Call was dropped during call so LVM for patient to let him know of the appt and if the appt time didn't work to call us back and let us know.

## 2024-04-17 NOTE — TELEPHONE ENCOUNTER
Voice mail answered my call.  I left my name and cell phone number.  I asked Mr. Nolasco to call me with any medication questions or issues.

## 2024-04-17 NOTE — ED TRIAGE NOTES
Patient arrives via [facility] from home, ambulatory to stretcher with CO leaking around urinary catheter

## 2024-04-17 NOTE — TELEPHONE ENCOUNTER
Called patient's home number, this number actually belongs to his neighbor. She wasn't sure exactly what symptoms he was having, and said she would send another mutual neighbor to request the patient answer his phone.   I went to lunch, and upon returning the patient had gone to the ER for catheter leaking around the catheter from the urethra.

## 2024-04-17 NOTE — PROGRESS NOTES
Physician Progress Note      PATIENT:               DINA MATTHEWS  CSN #:                  [id number]  :                       1945  ADMIT DATE:       2024 4:08 PM  DISCH DATE:        2024 12:06 PM  RESPONDING  PROVIDER #:        Elvi Cook MD          QUERY TEXT:    Patient admitted with sepsis. Noted to have dietician assessment with   malnutrition diagnosis in RD note on 4/15/2024. If possible, please document   in progress notes and discharge summary if you are evaluating and /or treating   any of the following:    The medical record reflects the following:  Risk Factors: 78 yr, chronic illness, sepsis,    Clinical Indicators: er RD note on 4/15 \" Body Fat Loss:  Mild body fat loss   (moderate) Orbital, Triceps, Fat Overlying Ribs, Buccal region, Mass Loss:    Severe muscle mass loss (Moderate to Severe) Clavicles (pectoralis &   deltoids), Temples (temporalis), Scapula (trapezius), Hand (interosseous), BMI   17.76    Treatment: RD assessment on 4/15, Ensure Enlive  BID    ASPEN Criteria:    https://aspenjournals.onlinelibrary.galicia.com/doi/full/10.1177/295283477016585  5      [email or web address]  Options provided:  -- Protein calorie malnutrition moderate  -- Other - I will add my own diagnosis  -- Disagree - Not applicable / Not valid  -- Disagree - Clinically unable to determine / Unknown  -- Refer to Clinical Documentation Reviewer    PROVIDER RESPONSE TEXT:    This patient has moderate protein calorie malnutrition.    Query created by: EVELYN BARNES on 2024 12:30 PM      Electronically signed by:  Elvi Cook MD 2024 9:29 AM

## 2024-04-17 NOTE — TELEPHONE ENCOUNTER
[facility] Care Pharmacist consult.  Mr. Nolasco was discharged from [facility] after having sepsis due to cystitis.  I called the [phone] number and spoke with Manju Guillermo who said she is his neighbor and tries to care for him.  She is not with him right now.  I explained my part of the HH team.  She said someone was suppose to be coming to see him between 10:30 and 11.  She gave me his cell number [phone].  She said he probably would not answer, but to leave him a message.  She said OK to call back.

## 2024-04-17 NOTE — ED NOTES
Patient mobility status  with difficulty, uses a walker. Provider aware     I have reviewed discharge instructions with the patient.  The patient verbalized understanding.    Patient left ED via Discharge Method: ambulatory with walker to Home with Friend.    Opportunity for questions and clarification provided.     Patient given 0 scripts.           Mariluz Georges, RN  04/17/24 7231

## 2024-04-17 NOTE — TELEPHONE ENCOUNTER
Called and talked with Harriett the patient wants a referral to [facility]. States she also Called APS patient not safe to live along. I will talk with Dr Armenta and have him put referral in again.

## 2024-04-17 NOTE — CASE COMMUNICATION
Pt nonadmit to homecare. Pt has no cg in home and needs a higher level of care.  Pt appears underweight and  falling in and out of home.  Pt is unsafe to drive due to falls/weakness but refuses MSW referral for community assistance and transportation arrangements.  Pt states he will continue to drive. pt unable to manage his carr catheter and no cg to assist.  Pt requesting previous homecare agency to resume and asks sn to request Dr. Bucky Armenta for referral. APS notified for pt safety concerns.  Dr. Armenta notified of above.

## 2024-04-19 ENCOUNTER — HOSPITAL ENCOUNTER (EMERGENCY)
Age: 79
Discharge: HOME | End: 2024-04-19
Attending: EMERGENCY MEDICINE
Payer: MEDICARE

## 2024-04-19 VITALS
HEIGHT: 68 IN | BODY MASS INDEX: 17.43 KG/M2 | RESPIRATION RATE: 18 BRPM | WEIGHT: 115 LBS | TEMPERATURE: 97.8 F | SYSTOLIC BLOOD PRESSURE: 149 MMHG | DIASTOLIC BLOOD PRESSURE: 87 MMHG | OXYGEN SATURATION: 97 % | HEART RATE: 77 BPM

## 2024-04-19 DIAGNOSIS — T83.9XXA PROBLEM WITH FOLEY CATHETER, INITIAL ENCOUNTER (HCC): Primary | ICD-10-CM

## 2024-04-19 PROCEDURE — 99283 EMERGENCY DEPT VISIT LOW MDM: CPT

## 2024-04-19 ASSESSMENT — PAIN - FUNCTIONAL ASSESSMENT: PAIN_FUNCTIONAL_ASSESSMENT: 0-10

## 2024-04-19 ASSESSMENT — PAIN SCALES - GENERAL
PAINLEVEL_OUTOF10: 0
PAINLEVEL_OUTOF10: 0

## 2024-04-19 NOTE — ED NOTES
I have reviewed discharge instructions with the patient.  The patient verbalized understanding.    Patient left ED via Discharge Method: stretcher to Home with [facility] .    Opportunity for questions and clarification provided.       Patient given 0 scripts.         To continue your aftercare when you leave the hospital, you may receive an automated call from our care team to check in on how you are doing.  This is a free service and part of our promise to provide the best care and service to meet your aftercare needs.” If you have questions, or wish to unsubscribe from this service please call [phone].  Thank you for Choosing our [facility] Emergency Department.        Sarah Brown, RN  04/19/24 1265

## 2024-04-19 NOTE — ED PROVIDER NOTES
Emergency Department Provider Note       PCP: Bucky Armenta DO   Age: 78 y.o.   Sex: male     DISPOSITION Decision To Discharge 04/19/2024 05:26:20 AM       ICD-10-CM    1. Problem with John catheter, initial encounter ([facility])  T83.9XXA           Medical Decision Making     Differential diagnosis    John catheter malfunction,     1 acute, uncomplicated illness or injury.    I independently ordered and reviewed each unique test.  I reviewed external records: ED visit note from an outside group.                   History     Patient is an 78-year-old male coming to the emergency department today secondary to leak in his John catheter connection to the large bag.  He said he was going down the steps and the large bag came off and so he put the leg bag on but it seems to still be leaking.  His pants are completely covered in urine.  Patient was seen and evaluated in the emergency department 2 days ago for a similar event.  John catheter was just exchanged.          ROS     Review of Systems     Physical Exam     Vitals signs and nursing note reviewed:  Vitals:    04/19/24 0504   BP: (!) 146/86   Pulse: 88   Resp: 17   Temp: 97.8 °F (36.6 °C)   TempSrc: Oral   SpO2: 97%   Weight: 52.2 kg (115 lb)   Height: 1.727 m (5' 8\")      Physical Exam     GENERAL:The patient has Body mass index is 17.49 kg/m². Well-hydrated.  No acute distress.  VITAL SIGNS: Heart rate, blood pressure, respiratory rate reviewed as recorded in  nurse's notes  EYES: Pupils reactive. Extraocular motion intact. No conjunctival redness or drainage.  LUNGS: No accessory muscle use  CARDIOVASCULAR: Regular rate and rhythm  EXTREMITIES: Pt moving all 4 extremities with out limitations. Normal muscle tone.  : No obvious leaking around the urethral meatus the John catheter.  NEUROLOGIC: Cranial nerve exam reveals face is symmetrical, tongue is midline  speech is clear. No focal deficits noted  SKIN: No rash or petechiae. Good skin turgor

## 2024-04-19 NOTE — ED TRIAGE NOTES
Pt bib [facility] from home with complaint of leakage at urinary catheter site. Denies pain. No further complaints.

## 2024-04-26 ENCOUNTER — NURSE ONLY (OUTPATIENT)
Dept: UROLOGY | Age: 79
End: 2024-04-26

## 2024-04-26 ENCOUNTER — HOME HEALTH ADMISSION (OUTPATIENT)
Dept: HOME HEALTH SERVICES | Facility: HOME HEALTH | Age: 79
End: 2024-04-26

## 2024-04-26 DIAGNOSIS — N31.9 NEUROGENIC BLADDER: Primary | ICD-10-CM

## 2024-04-26 DIAGNOSIS — Z97.8 CHRONIC INDWELLING FOLEY CATHETER: ICD-10-CM

## 2024-04-26 NOTE — PROGRESS NOTES
Pt walked in to office with complaints of catheter leakage. Pt had been seen on 3 different occasion at ER for leakage. 16f carr catheter without incident. Pt was irrigated with sterile water. Pt tolerated procedure well. Pt stated Home health discharged him.   New home health order referral requested.

## 2024-04-27 NOTE — PROGRESS NOTES
Physician Progress Note      PATIENT:               DINA MATTHEWS  CSN #:                  [id number]  :                       1945  ADMIT DATE:       2024 4:08 PM  DISCH DATE:        2024 12:06 PM  RESPONDING  PROVIDER #:        Elvi Cook MD          QUERY TEXT:    Patient was diagnosed with UTI & sepsis. The patient was documented to have   chronic indwelling Carr catheter. Patient was treated with antibiotics and   Carr was exchanged. After study, can the UTI and sepsis be further specified   as:      The medical record reflects the following:  Risk Factors: 78yr, sepsis, UTI, chronic carr catheter, right ureteral stent    Clinical Indicators: documented to have chronic indwelling Carr catheter,   right ureteral stent exchanged every 6 months, per Urology consult note on    \"Carr was exchanged upon arrival to ED\"    Treatment: Carr was exchanged, antibiotics        [email or web address]  Options provided:  -- Due to Carr catheter  -- Unrelated to Carr catheter  -- Other - I will add my own diagnosis  -- Disagree - Not applicable / Not valid  -- Disagree - Clinically unable to determine / Unknown  -- Refer to Clinical Documentation Reviewer    PROVIDER RESPONSE TEXT:    Due to Carr catheter.    Query created by: EVELYN BARNES on 2024 1:17 PM      Electronically signed by:  Elvi Cook MD 2024 7:14 AM

## 2024-05-17 ENCOUNTER — NURSE ONLY (OUTPATIENT)
Dept: UROLOGY | Age: 79
End: 2024-05-17
Payer: MEDICARE

## 2024-05-17 DIAGNOSIS — N31.9 NEUROGENIC BLADDER: Primary | ICD-10-CM

## 2024-05-17 DIAGNOSIS — Z46.6 URINARY CATHETER CHANGE REQUIRED: ICD-10-CM

## 2024-05-17 PROCEDURE — 51702 INSERT TEMP BLADDER CATH: CPT | Performed by: UROLOGY

## 2024-05-17 NOTE — PROGRESS NOTES
Pt came in for catheter change. 16f coude carr catheter changed without incident. Patient tolerated procedure well.

## 2024-05-22 ENCOUNTER — APPOINTMENT (OUTPATIENT)
Dept: CT IMAGING | Age: 79
DRG: 698 | End: 2024-05-22
Payer: MEDICARE

## 2024-05-22 ENCOUNTER — HOSPITAL ENCOUNTER (INPATIENT)
Age: 79
LOS: 4 days | Discharge: NURSING, REHAB OR LONG-TERM CARE | DRG: 698 | End: 2024-05-26
Attending: GENERAL PRACTICE | Admitting: FAMILY MEDICINE
Payer: MEDICARE
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
## 2024-05-22 DIAGNOSIS — F41.9 ANXIETY: ICD-10-CM

## 2024-05-22 DIAGNOSIS — F32.A DEPRESSION, UNSPECIFIED DEPRESSION TYPE: ICD-10-CM

## 2024-05-22 DIAGNOSIS — E87.1 HYPONATREMIA: ICD-10-CM

## 2024-05-22 DIAGNOSIS — R53.1 ACUTE WEAKNESS: ICD-10-CM

## 2024-05-22 DIAGNOSIS — L89.132 PRESSURE INJURY OF RIGHT LOWER BACK, STAGE 2 (HCC): ICD-10-CM

## 2024-05-22 DIAGNOSIS — S01.01XA LACERATION OF SCALP, INITIAL ENCOUNTER: Primary | ICD-10-CM

## 2024-05-22 LAB
ALBUMIN SERPL-MCNC: 2.9 G/DL (ref 3.2–4.6)
ALBUMIN/GLOB SERPL: 0.9 (ref 1–1.9)
ALP SERPL-CCNC: 72 U/L (ref 40–129)
ALT SERPL-CCNC: 48 U/L (ref 12–65)
ANION GAP SERPL CALC-SCNC: 10 MMOL/L (ref 9–18)
APPEARANCE UR: ABNORMAL
APPEARANCE UR: ABNORMAL
AST SERPL-CCNC: 66 U/L (ref 15–37)
BACTERIA URNS QL MICRO: 0 /HPF
BACTERIA URNS QL MICRO: ABNORMAL /HPF
BASOPHILS # BLD: 0 K/UL (ref 0–0.2)
BASOPHILS NFR BLD: 0 % (ref 0–2)
BILIRUB SERPL-MCNC: 0.5 MG/DL (ref 0–1.2)
BILIRUB UR QL: NEGATIVE
BILIRUB UR QL: NEGATIVE
BUN SERPL-MCNC: 41 MG/DL (ref 8–23)
CALCIUM SERPL-MCNC: 8.5 MG/DL (ref 8.8–10.2)
CASTS URNS QL MICRO: ABNORMAL /LPF
CHLORIDE SERPL-SCNC: 98 MMOL/L (ref 98–107)
CO2 SERPL-SCNC: 25 MMOL/L (ref 20–28)
COLOR UR: ABNORMAL
COLOR UR: ABNORMAL
CREAT SERPL-MCNC: 1.31 MG/DL (ref 0.8–1.3)
CRYSTALS URNS QL MICRO: 0 /LPF
DIFFERENTIAL METHOD BLD: ABNORMAL
EKG ATRIAL RATE: 83 BPM
EKG DIAGNOSIS: NORMAL
EKG P AXIS: 70 DEGREES
EKG P-R INTERVAL: 198 MS
EKG Q-T INTERVAL: 488 MS
EKG QRS DURATION: 112 MS
EKG QTC CALCULATION (BAZETT): 570 MS
EKG R AXIS: 75 DEGREES
EKG VENTRICULAR RATE: 82 BPM
EOSINOPHIL # BLD: 0 K/UL (ref 0–0.8)
EOSINOPHIL NFR BLD: 0 % (ref 0.5–7.8)
EPI CELLS #/AREA URNS HPF: ABNORMAL /HPF
EPI CELLS #/AREA URNS HPF: ABNORMAL /HPF
ERYTHROCYTE [DISTWIDTH] IN BLOOD BY AUTOMATED COUNT: 13.4 % (ref 11.9–14.6)
GLOBULIN SER CALC-MCNC: 3.2 G/DL (ref 2.3–3.5)
GLUCOSE SERPL-MCNC: 133 MG/DL (ref 70–99)
GLUCOSE UR STRIP.AUTO-MCNC: NEGATIVE MG/DL
GLUCOSE UR STRIP.AUTO-MCNC: NEGATIVE MG/DL
HCT VFR BLD AUTO: 29.4 % (ref 41.1–50.3)
HGB BLD-MCNC: 9.6 G/DL (ref 13.6–17.2)
HGB UR QL STRIP: ABNORMAL
HGB UR QL STRIP: ABNORMAL
IMM GRANULOCYTES # BLD AUTO: 0 K/UL (ref 0–0.5)
IMM GRANULOCYTES NFR BLD AUTO: 0 % (ref 0–5)
KETONES UR QL STRIP.AUTO: NEGATIVE MG/DL
KETONES UR QL STRIP.AUTO: NEGATIVE MG/DL
LACTATE SERPL-SCNC: 1.6 MMOL/L (ref 0.5–2)
LEUKOCYTE ESTERASE UR QL STRIP.AUTO: ABNORMAL
LEUKOCYTE ESTERASE UR QL STRIP.AUTO: NEGATIVE
LYMPHOCYTES # BLD: 0.3 K/UL (ref 0.5–4.6)
LYMPHOCYTES NFR BLD: 3 % (ref 13–44)
MCH RBC QN AUTO: 27.8 PG (ref 26.1–32.9)
MCHC RBC AUTO-ENTMCNC: 32.7 G/DL (ref 31.4–35)
MCV RBC AUTO: 85.2 FL (ref 82–102)
MONOCYTES # BLD: 0.5 K/UL (ref 0.1–1.3)
MONOCYTES NFR BLD: 5 % (ref 4–12)
MUCOUS THREADS URNS QL MICRO: 0 /LPF
NEUTS SEG # BLD: 9.5 K/UL (ref 1.7–8.2)
NEUTS SEG NFR BLD: 92 % (ref 43–78)
NITRITE UR QL STRIP.AUTO: NEGATIVE
NITRITE UR QL STRIP.AUTO: POSITIVE
NRBC # BLD: 0 K/UL (ref 0–0.2)
OTHER OBSERVATIONS: ABNORMAL
OTHER OBSERVATIONS: ABNORMAL
PH UR STRIP: 6 (ref 5–9)
PH UR STRIP: >=9 (ref 5–9)
PLATELET # BLD AUTO: 180 K/UL (ref 150–450)
PMV BLD AUTO: 9.4 FL (ref 9.4–12.3)
POTASSIUM SERPL-SCNC: 3.8 MMOL/L (ref 3.5–5.1)
PROT SERPL-MCNC: 6.1 G/DL (ref 6.3–8.2)
PROT UR STRIP-MCNC: 100 MG/DL
PROT UR STRIP-MCNC: 300 MG/DL
RBC # BLD AUTO: 3.45 M/UL (ref 4.23–5.6)
RBC #/AREA URNS HPF: ABNORMAL /HPF
RBC #/AREA URNS HPF: ABNORMAL /HPF
SODIUM SERPL-SCNC: 133 MMOL/L (ref 136–145)
SP GR UR REFRACTOMETRY: 1.01 (ref 1–1.02)
SP GR UR REFRACTOMETRY: 1.02 (ref 1–1.02)
URINE CULTURE IF INDICATED: ABNORMAL
UROBILINOGEN UR QL STRIP.AUTO: 0.2 EU/DL (ref 0.2–1)
UROBILINOGEN UR QL STRIP.AUTO: 0.2 EU/DL (ref 0.2–1)
WBC # BLD AUTO: 10.3 K/UL (ref 4.3–11.1)
WBC URNS QL MICRO: ABNORMAL /HPF
WBC URNS QL MICRO: ABNORMAL /HPF

## 2024-05-22 PROCEDURE — 87077 CULTURE AEROBIC IDENTIFY: CPT

## 2024-05-22 PROCEDURE — 6370000000 HC RX 637 (ALT 250 FOR IP): Performed by: FAMILY MEDICINE

## 2024-05-22 PROCEDURE — 87086 URINE CULTURE/COLONY COUNT: CPT

## 2024-05-22 PROCEDURE — 85025 COMPLETE CBC W/AUTO DIFF WBC: CPT

## 2024-05-22 PROCEDURE — 87040 BLOOD CULTURE FOR BACTERIA: CPT

## 2024-05-22 PROCEDURE — 87205 SMEAR GRAM STAIN: CPT

## 2024-05-22 PROCEDURE — 99285 EMERGENCY DEPT VISIT HI MDM: CPT

## 2024-05-22 PROCEDURE — 87186 SC STD MICRODIL/AGAR DIL: CPT

## 2024-05-22 PROCEDURE — 93010 ELECTROCARDIOGRAM REPORT: CPT | Performed by: INTERNAL MEDICINE

## 2024-05-22 PROCEDURE — 87154 CUL TYP ID BLD PTHGN 6+ TRGT: CPT

## 2024-05-22 PROCEDURE — 80053 COMPREHEN METABOLIC PANEL: CPT

## 2024-05-22 PROCEDURE — 81001 URINALYSIS AUTO W/SCOPE: CPT

## 2024-05-22 PROCEDURE — 51702 INSERT TEMP BLADDER CATH: CPT

## 2024-05-22 PROCEDURE — 87088 URINE BACTERIA CULTURE: CPT

## 2024-05-22 PROCEDURE — 93005 ELECTROCARDIOGRAM TRACING: CPT | Performed by: FAMILY MEDICINE

## 2024-05-22 PROCEDURE — 83605 ASSAY OF LACTIC ACID: CPT

## 2024-05-22 PROCEDURE — 96360 HYDRATION IV INFUSION INIT: CPT

## 2024-05-22 PROCEDURE — 2580000003 HC RX 258: Performed by: GENERAL PRACTICE

## 2024-05-22 PROCEDURE — 1100000003 HC PRIVATE W/ TELEMETRY

## 2024-05-22 PROCEDURE — 70450 CT HEAD/BRAIN W/O DYE: CPT

## 2024-05-22 PROCEDURE — 2580000003 HC RX 258: Performed by: FAMILY MEDICINE

## 2024-05-22 RX ORDER — MIRTAZAPINE 15 MG/1
15 TABLET, FILM COATED ORAL NIGHTLY
Status: DISCONTINUED | OUTPATIENT
Start: 2024-05-22 | End: 2024-05-26 | Stop reason: HOSPADM

## 2024-05-22 RX ORDER — 0.9 % SODIUM CHLORIDE 0.9 %
1000 INTRAVENOUS SOLUTION INTRAVENOUS ONCE
Status: COMPLETED | OUTPATIENT
Start: 2024-05-22 | End: 2024-05-22

## 2024-05-22 RX ORDER — AMITRIPTYLINE HYDROCHLORIDE 50 MG/1
100 TABLET, FILM COATED ORAL NIGHTLY
Status: DISCONTINUED | OUTPATIENT
Start: 2024-05-22 | End: 2024-05-26 | Stop reason: HOSPADM

## 2024-05-22 RX ORDER — CLONAZEPAM 1 MG/1
2 TABLET ORAL NIGHTLY PRN
Status: DISCONTINUED | OUTPATIENT
Start: 2024-05-22 | End: 2024-05-26 | Stop reason: HOSPADM

## 2024-05-22 RX ORDER — CARVEDILOL 3.12 MG/1
6.25 TABLET ORAL 2 TIMES DAILY WITH MEALS
Status: DISCONTINUED | OUTPATIENT
Start: 2024-05-22 | End: 2024-05-26 | Stop reason: HOSPADM

## 2024-05-22 RX ORDER — HYDROXYZINE HYDROCHLORIDE 25 MG/1
50 TABLET, FILM COATED ORAL 2 TIMES DAILY
Status: DISCONTINUED | OUTPATIENT
Start: 2024-05-22 | End: 2024-05-26 | Stop reason: HOSPADM

## 2024-05-22 RX ORDER — ACETAMINOPHEN 325 MG/1
650 TABLET ORAL EVERY 6 HOURS PRN
Status: DISCONTINUED | OUTPATIENT
Start: 2024-05-22 | End: 2024-05-26 | Stop reason: HOSPADM

## 2024-05-22 RX ORDER — OLANZAPINE 5 MG/1
5 TABLET ORAL NIGHTLY
Status: DISCONTINUED | OUTPATIENT
Start: 2024-05-22 | End: 2024-05-26 | Stop reason: HOSPADM

## 2024-05-22 RX ORDER — SODIUM CHLORIDE 0.9 % (FLUSH) 0.9 %
5-40 SYRINGE (ML) INJECTION EVERY 12 HOURS SCHEDULED
Status: DISCONTINUED | OUTPATIENT
Start: 2024-05-22 | End: 2024-05-26 | Stop reason: HOSPADM

## 2024-05-22 RX ORDER — ACETAMINOPHEN 650 MG/1
650 SUPPOSITORY RECTAL EVERY 6 HOURS PRN
Status: DISCONTINUED | OUTPATIENT
Start: 2024-05-22 | End: 2024-05-26 | Stop reason: HOSPADM

## 2024-05-22 RX ORDER — SODIUM CHLORIDE 9 MG/ML
INJECTION, SOLUTION INTRAVENOUS PRN
Status: DISCONTINUED | OUTPATIENT
Start: 2024-05-22 | End: 2024-05-26 | Stop reason: HOSPADM

## 2024-05-22 RX ORDER — FAMOTIDINE 20 MG/1
10 TABLET, FILM COATED ORAL DAILY PRN
Status: DISCONTINUED | OUTPATIENT
Start: 2024-05-22 | End: 2024-05-26 | Stop reason: HOSPADM

## 2024-05-22 RX ORDER — ONDANSETRON 2 MG/ML
4 INJECTION INTRAMUSCULAR; INTRAVENOUS EVERY 6 HOURS PRN
Status: DISCONTINUED | OUTPATIENT
Start: 2024-05-22 | End: 2024-05-22

## 2024-05-22 RX ORDER — OXYBUTYNIN CHLORIDE 10 MG/1
10 TABLET, EXTENDED RELEASE ORAL DAILY
Status: DISCONTINUED | OUTPATIENT
Start: 2024-05-22 | End: 2024-05-22 | Stop reason: SDUPTHER

## 2024-05-22 RX ORDER — POLYETHYLENE GLYCOL 3350 17 G/17G
17 POWDER, FOR SOLUTION ORAL DAILY PRN
Status: DISCONTINUED | OUTPATIENT
Start: 2024-05-22 | End: 2024-05-26 | Stop reason: HOSPADM

## 2024-05-22 RX ORDER — TROSPIUM CHLORIDE 20 MG/1
20 TABLET, FILM COATED ORAL NIGHTLY
Status: DISCONTINUED | OUTPATIENT
Start: 2024-05-22 | End: 2024-05-26 | Stop reason: HOSPADM

## 2024-05-22 RX ORDER — ONDANSETRON 4 MG/1
4 TABLET, ORALLY DISINTEGRATING ORAL EVERY 8 HOURS PRN
Status: DISCONTINUED | OUTPATIENT
Start: 2024-05-22 | End: 2024-05-22

## 2024-05-22 RX ORDER — SODIUM CHLORIDE 0.9 % (FLUSH) 0.9 %
5-40 SYRINGE (ML) INJECTION PRN
Status: DISCONTINUED | OUTPATIENT
Start: 2024-05-22 | End: 2024-05-26 | Stop reason: HOSPADM

## 2024-05-22 RX ORDER — BISACODYL 10 MG
10 SUPPOSITORY, RECTAL RECTAL DAILY PRN
Status: DISCONTINUED | OUTPATIENT
Start: 2024-05-22 | End: 2024-05-26 | Stop reason: HOSPADM

## 2024-05-22 RX ORDER — MAGNESIUM HYDROXIDE/ALUMINUM HYDROXICE/SIMETHICONE 120; 1200; 1200 MG/30ML; MG/30ML; MG/30ML
30 SUSPENSION ORAL EVERY 6 HOURS PRN
Status: DISCONTINUED | OUTPATIENT
Start: 2024-05-22 | End: 2024-05-26 | Stop reason: HOSPADM

## 2024-05-22 RX ADMIN — CARVEDILOL 6.25 MG: 3.12 TABLET, FILM COATED ORAL at 18:07

## 2024-05-22 RX ADMIN — AMITRIPTYLINE HYDROCHLORIDE 100 MG: 50 TABLET, FILM COATED ORAL at 21:06

## 2024-05-22 RX ADMIN — SODIUM CHLORIDE 1000 ML: 9 INJECTION, SOLUTION INTRAVENOUS at 13:17

## 2024-05-22 RX ADMIN — CLONAZEPAM 2 MG: 1 TABLET ORAL at 21:12

## 2024-05-22 RX ADMIN — TROSPIUM CHLORIDE 20 MG: 20 TABLET, FILM COATED ORAL at 21:06

## 2024-05-22 RX ADMIN — OLANZAPINE 5 MG: 5 TABLET, FILM COATED ORAL at 21:04

## 2024-05-22 RX ADMIN — HYDROXYZINE HYDROCHLORIDE 50 MG: 25 TABLET, FILM COATED ORAL at 21:06

## 2024-05-22 RX ADMIN — SODIUM CHLORIDE, PRESERVATIVE FREE 10 ML: 5 INJECTION INTRAVENOUS at 21:23

## 2024-05-22 ASSESSMENT — PAIN - FUNCTIONAL ASSESSMENT: PAIN_FUNCTIONAL_ASSESSMENT: 0-10

## 2024-05-22 ASSESSMENT — PAIN SCALES - GENERAL
PAINLEVEL_OUTOF10: 0
PAINLEVEL_OUTOF10: 4

## 2024-05-22 NOTE — PROGRESS NOTES
TRANSFER - IN REPORT:    Verbal report received from MIGUEL Jones on Peter Nolasco  being received from ER for routine progression of patient care      Report consisted of patient's Situation, Background, Assessment and   Recommendations(SBAR).     Information from the following report(s) Nurse Handoff Report was reviewed with the receiving nurse.    Opportunity for questions and clarification was provided.      Assessment completed upon patient's arrival to unit and care assumed.

## 2024-05-22 NOTE — ED TRIAGE NOTES
Coming from home via EMS. Patient had a fall last night, about 12 hours ago. Pt fell down the stairs, did hit head, states he lost his balance. stating right shoulder pain. No bleeding at the moment. Denies LOC, denies blood thinners. Laceration to top of the head. Was on the floor for 12 hours before EMS arrived

## 2024-05-22 NOTE — ACP (ADVANCE CARE PLANNING)
Advance Care Planning   Healthcare Decision Maker:    Primary Decision Maker: MaxTorie Ann - Niece/Nephew - [phone]    Secondary Decision Maker: GuillermoManju - Matilda - [phone]    Click here to complete Healthcare Decision Makers including selection of the Healthcare Decision Maker Relationship (ie \"Primary\").  Today we documented Decision Maker(s) consistent with Legal Next of Kin hierarchy.

## 2024-05-22 NOTE — H&P
symptomatic.        Signed:  ATIYA QUEVEDO DO    Part of this note may have been written by using a voice dictation software.  The note has been proof read but may still contain some grammatical/other typographical errors.

## 2024-05-22 NOTE — PROGRESS NOTES
4 Eyes Skin Assessment     NAME:  Peter Nolasco  YOB: 1945  MEDICAL RECORD NUMBER:  [id number]    The patient is being assessed for  Admission    I agree that at least one RN has performed a thorough Head to Toe Skin Assessment on the patient. ALL assessment sites listed below have been assessed.      Areas assessed by both nurses:    Head, Face, Ears, Shoulders, Back, Chest, Arms, Elbows, Hands, Sacrum. Buttock, Coccyx, Ischium, and Legs. Feet and Heels        Does the Patient have a Wound? Yes wound(s) were present on assessment. LDA wound assessment was Initiated and completed by RN    Mid lower back wound, Scattered tears (back, LUE), scattered bruising, laceration to head from fall- sutures.       Rigoberto Prevention initiated by RN: Yes  Wound Care Orders initiated by RN: Yes    Pressure Injury (Stage 3,4, Unstageable, DTI, NWPT, and Complex wounds) if present, place Wound referral order by RN under : Yes    New Ostomies, if present place, Ostomy referral order under : Yes     Nurse 1 eSignature: Electronically signed by Marsha Reese RN on 5/22/24 at 5:55 PM EDT    **SHARE this note so that the co-signing nurse can place an eSignature**    Nurse 2 eSignature: Electronically signed by Nicolasa Orlando RN on 5/22/24 at 6:25 PM EDT

## 2024-05-22 NOTE — ED NOTES
TRANSFER - OUT REPORT:    Verbal report given to MIGUEL Larson on Peter Nolasco  being transferred to [facility] for routine progression of patient care       Report consisted of patient's Situation, Background, Assessment and   Recommendations(SBAR).     Information from the following report(s) ED SBAR was reviewed with the receiving nurse.    Lines:   Peripheral IV 05/22/24 Proximal;Right Forearm (Active)       Peripheral IV 05/22/24 Left Antecubital (Active)   Site Assessment Clean, dry & intact 05/22/24 1621   Line Status Blood return noted 05/22/24 1621   Phlebitis Assessment No symptoms 05/22/24 1621   Infiltration Assessment 0 05/22/24 1621        Opportunity for questions and clarification was provided.      Patient transported with:  Nasim Garcia RN  05/22/24 0098

## 2024-05-22 NOTE — ED PROVIDER NOTES
complaining of weakness.  He denies any fevers cough or vomiting.  He denies any diarrhea.  He has no other complaints.        ROS     Review of Systems   All other systems reviewed and are negative.       Physical Exam     Vitals signs and nursing note reviewed:  Vitals:    05/22/24 1330 05/22/24 1430 05/22/24 1445 05/22/24 1500   BP: (!) 166/88 134/71 (!) 155/84 (!) 143/79   Pulse: 82 87 85 84   Resp: 18 23 21 20   Temp:       TempSrc:       SpO2: 100% 100% 100% 100%   Weight:       Height:          Physical Exam  Constitutional:       General: He is not in acute distress.  HENT:      Head: Normocephalic and atraumatic.      Right Ear: External ear normal.      Left Ear: External ear normal.      Nose: No congestion or rhinorrhea.      Mouth/Throat:      Mouth: Mucous membranes are moist.   Eyes:      Extraocular Movements: Extraocular movements intact.      Pupils: Pupils are equal, round, and reactive to light.   Cardiovascular:      Rate and Rhythm: Normal rate and regular rhythm.      Heart sounds: Normal heart sounds.   Pulmonary:      Effort: Pulmonary effort is normal.      Breath sounds: Normal breath sounds.   Abdominal:      General: There is no distension.      Palpations: Abdomen is soft.      Tenderness: There is no abdominal tenderness.   Musculoskeletal:         General: No swelling or tenderness. Normal range of motion.      Cervical back: Normal range of motion.   Skin:     Findings: No rash.      Comments: Right occipital central scalp laceration 2 cm   Neurological:      General: No focal deficit present.      Mental Status: He is alert and oriented to person, place, and time.   Psychiatric:         Mood and Affect: Mood normal.        Procedures     Procedures    Orders Placed This Encounter   Procedures    Blood Culture 1    Blood Culture 2    CT Head W/O Contrast    CBC with Auto Differential    CMP    Lactate, Sepsis    Urinalysis        Medications given during this emergency department

## 2024-05-23 ENCOUNTER — TELEPHONE (OUTPATIENT)
Dept: INTERNAL MEDICINE CLINIC | Facility: CLINIC | Age: 79
End: 2024-05-23

## 2024-05-23 ENCOUNTER — APPOINTMENT (OUTPATIENT)
Dept: GENERAL RADIOLOGY | Age: 79
DRG: 698 | End: 2024-05-23
Payer: MEDICARE

## 2024-05-23 PROBLEM — R78.81 BACTEREMIA DUE TO GRAM-NEGATIVE BACTERIA: Status: ACTIVE | Noted: 2024-05-23

## 2024-05-23 LAB
ACCESSION NUMBER, LLC1M: ABNORMAL
ACINETOBACTER CALCOAC BAUMANNII COMPLEX BY PCR: NOT DETECTED
ANION GAP SERPL CALC-SCNC: 7 MMOL/L (ref 9–18)
B FRAGILIS DNA BLD POS QL NAA+NON-PROBE: NOT DETECTED
BASOPHILS # BLD: 0 K/UL (ref 0–0.2)
BASOPHILS NFR BLD: 0 % (ref 0–2)
BIOFIRE TEST COMMENT: ABNORMAL
BLACTX-M ISLT/SPM QL: NOT DETECTED
BLAIMP ISLT/SPM QL: NOT DETECTED
BLAKPC ISLT/SPM QL: NOT DETECTED
BLAOXA-48-LIKE ISLT/SPM QL: NOT DETECTED
BLAVIM ISLT/SPM QL: NOT DETECTED
BUN SERPL-MCNC: 29 MG/DL (ref 8–23)
C ALBICANS DNA BLD POS QL NAA+NON-PROBE: NOT DETECTED
C AURIS DNA BLD POS QL NAA+NON-PROBE: NOT DETECTED
C GATTII+NEOFOR DNA BLD POS QL NAA+N-PRB: NOT DETECTED
C GLABRATA DNA BLD POS QL NAA+NON-PROBE: NOT DETECTED
C KRUSEI DNA BLD POS QL NAA+NON-PROBE: NOT DETECTED
C PARAP DNA BLD POS QL NAA+NON-PROBE: NOT DETECTED
C TROPICLS DNA BLD POS QL NAA+NON-PROBE: NOT DETECTED
CALCIUM SERPL-MCNC: 8.3 MG/DL (ref 8.8–10.2)
CHLORIDE SERPL-SCNC: 102 MMOL/L (ref 98–107)
CO2 SERPL-SCNC: 27 MMOL/L (ref 20–28)
COLISTIN RES MCR-1 ISLT/SPM QL: NOT DETECTED
CREAT SERPL-MCNC: 1.05 MG/DL (ref 0.8–1.3)
DIFFERENTIAL METHOD BLD: ABNORMAL
E CLOAC COMP DNA BLD POS NAA+NON-PROBE: NOT DETECTED
E COLI DNA BLD POS QL NAA+NON-PROBE: DETECTED
E FAECALIS DNA BLD POS QL NAA+NON-PROBE: NOT DETECTED
E FAECIUM DNA BLD POS QL NAA+NON-PROBE: NOT DETECTED
ENTEROBACTERALES DNA BLD POS NAA+N-PRB: DETECTED
EOSINOPHIL # BLD: 0 K/UL (ref 0–0.8)
EOSINOPHIL NFR BLD: 0 % (ref 0.5–7.8)
ERYTHROCYTE [DISTWIDTH] IN BLOOD BY AUTOMATED COUNT: 13.6 % (ref 11.9–14.6)
GLUCOSE SERPL-MCNC: 101 MG/DL (ref 70–99)
GP B STREP DNA BLD POS QL NAA+NON-PROBE: NOT DETECTED
HAEM INFLU DNA BLD POS QL NAA+NON-PROBE: NOT DETECTED
HCT VFR BLD AUTO: 26 % (ref 41.1–50.3)
HGB BLD-MCNC: 8.1 G/DL (ref 13.6–17.2)
IMM GRANULOCYTES # BLD AUTO: 0 K/UL (ref 0–0.5)
IMM GRANULOCYTES NFR BLD AUTO: 0 % (ref 0–5)
K OXYTOCA DNA BLD POS QL NAA+NON-PROBE: NOT DETECTED
KLEBSIELLA SP DNA BLD POS QL NAA+NON-PRB: NOT DETECTED
KLEBSIELLA SP DNA BLD POS QL NAA+NON-PRB: NOT DETECTED
L MONOCYTOG DNA BLD POS QL NAA+NON-PROBE: NOT DETECTED
LYMPHOCYTES # BLD: 0.3 K/UL (ref 0.5–4.6)
LYMPHOCYTES NFR BLD: 4 % (ref 13–44)
MCH RBC QN AUTO: 27.5 PG (ref 26.1–32.9)
MCHC RBC AUTO-ENTMCNC: 31.2 G/DL (ref 31.4–35)
MCV RBC AUTO: 88.1 FL (ref 82–102)
MONOCYTES # BLD: 0.4 K/UL (ref 0.1–1.3)
MONOCYTES NFR BLD: 6 % (ref 4–12)
N MEN DNA BLD POS QL NAA+NON-PROBE: NOT DETECTED
NEUTS SEG # BLD: 6.1 K/UL (ref 1.7–8.2)
NEUTS SEG NFR BLD: 90 % (ref 43–78)
NRBC # BLD: 0 K/UL (ref 0–0.2)
P AERUGINOSA DNA BLD POS NAA+NON-PROBE: NOT DETECTED
PLATELET # BLD AUTO: 146 K/UL (ref 150–450)
PMV BLD AUTO: 9 FL (ref 9.4–12.3)
POTASSIUM SERPL-SCNC: 3.9 MMOL/L (ref 3.5–5.1)
PROTEUS SP DNA BLD POS QL NAA+NON-PROBE: NOT DETECTED
RBC # BLD AUTO: 2.95 M/UL (ref 4.23–5.6)
RESISTANT GENE NDM BY PCR: NOT DETECTED
RESISTANT GENE TARGETS: ABNORMAL
S AUREUS DNA BLD POS QL NAA+NON-PROBE: NOT DETECTED
S AUREUS+CONS DNA BLD POS NAA+NON-PROBE: NOT DETECTED
S EPIDERMIDIS DNA BLD POS QL NAA+NON-PRB: NOT DETECTED
S LUGDUNENSIS DNA BLD POS QL NAA+NON-PRB: NOT DETECTED
S MALTOPHILIA DNA BLD POS QL NAA+NON-PRB: NOT DETECTED
S MARCESCENS DNA BLD POS NAA+NON-PROBE: NOT DETECTED
S PNEUM DNA BLD POS QL NAA+NON-PROBE: NOT DETECTED
S PYO DNA BLD POS QL NAA+NON-PROBE: NOT DETECTED
SALMONELLA DNA BLD POS QL NAA+NON-PROBE: NOT DETECTED
SODIUM SERPL-SCNC: 137 MMOL/L (ref 136–145)
STREPTOCOCCUS DNA BLD POS NAA+NON-PROBE: NOT DETECTED
WBC # BLD AUTO: 6.8 K/UL (ref 4.3–11.1)

## 2024-05-23 PROCEDURE — 2580000003 HC RX 258: Performed by: FAMILY MEDICINE

## 2024-05-23 PROCEDURE — 36415 COLL VENOUS BLD VENIPUNCTURE: CPT

## 2024-05-23 PROCEDURE — 2580000003 HC RX 258: Performed by: NURSE PRACTITIONER

## 2024-05-23 PROCEDURE — 6360000002 HC RX W HCPCS: Performed by: INTERNAL MEDICINE

## 2024-05-23 PROCEDURE — 6360000002 HC RX W HCPCS: Performed by: NURSE PRACTITIONER

## 2024-05-23 PROCEDURE — 2580000003 HC RX 258: Performed by: INTERNAL MEDICINE

## 2024-05-23 PROCEDURE — 97166 OT EVAL MOD COMPLEX 45 MIN: CPT

## 2024-05-23 PROCEDURE — 71045 X-RAY EXAM CHEST 1 VIEW: CPT

## 2024-05-23 PROCEDURE — 87040 BLOOD CULTURE FOR BACTERIA: CPT

## 2024-05-23 PROCEDURE — 51702 INSERT TEMP BLADDER CATH: CPT

## 2024-05-23 PROCEDURE — 2500000003 HC RX 250 WO HCPCS: Performed by: INTERNAL MEDICINE

## 2024-05-23 PROCEDURE — 6370000000 HC RX 637 (ALT 250 FOR IP): Performed by: FAMILY MEDICINE

## 2024-05-23 PROCEDURE — 73030 X-RAY EXAM OF SHOULDER: CPT

## 2024-05-23 PROCEDURE — 85025 COMPLETE CBC W/AUTO DIFF WBC: CPT

## 2024-05-23 PROCEDURE — 97535 SELF CARE MNGMENT TRAINING: CPT

## 2024-05-23 PROCEDURE — 80048 BASIC METABOLIC PNL TOTAL CA: CPT

## 2024-05-23 PROCEDURE — 97112 NEUROMUSCULAR REEDUCATION: CPT

## 2024-05-23 PROCEDURE — 1100000000 HC RM PRIVATE

## 2024-05-23 PROCEDURE — 97162 PT EVAL MOD COMPLEX 30 MIN: CPT

## 2024-05-23 RX ORDER — SODIUM HYPOCHLORITE 1.25 MG/ML
SOLUTION TOPICAL DAILY
Status: DISCONTINUED | OUTPATIENT
Start: 2024-05-23 | End: 2024-05-26 | Stop reason: HOSPADM

## 2024-05-23 RX ADMIN — LEVOTHYROXINE SODIUM 125 MCG: 0.07 TABLET ORAL at 05:04

## 2024-05-23 RX ADMIN — CEFTRIAXONE SODIUM 2000 MG: 2 INJECTION, POWDER, FOR SOLUTION INTRAMUSCULAR; INTRAVENOUS at 15:45

## 2024-05-23 RX ADMIN — TUBERCULIN PURIFIED PROTEIN DERIVATIVE 5 UNITS: 5 INJECTION, SOLUTION INTRADERMAL at 09:28

## 2024-05-23 RX ADMIN — AMITRIPTYLINE HYDROCHLORIDE 100 MG: 50 TABLET, FILM COATED ORAL at 21:09

## 2024-05-23 RX ADMIN — CLONAZEPAM 2 MG: 1 TABLET ORAL at 21:09

## 2024-05-23 RX ADMIN — SODIUM CHLORIDE, PRESERVATIVE FREE 10 ML: 5 INJECTION INTRAVENOUS at 21:14

## 2024-05-23 RX ADMIN — CARVEDILOL 6.25 MG: 3.12 TABLET, FILM COATED ORAL at 16:26

## 2024-05-23 RX ADMIN — TROSPIUM CHLORIDE 20 MG: 20 TABLET, FILM COATED ORAL at 21:09

## 2024-05-23 RX ADMIN — WATER 1000 MG: 1 INJECTION INTRAMUSCULAR; INTRAVENOUS; SUBCUTANEOUS at 01:53

## 2024-05-23 RX ADMIN — HYDROXYZINE HYDROCHLORIDE 50 MG: 25 TABLET, FILM COATED ORAL at 09:28

## 2024-05-23 RX ADMIN — OLANZAPINE 5 MG: 5 TABLET, FILM COATED ORAL at 21:09

## 2024-05-23 RX ADMIN — CARVEDILOL 6.25 MG: 3.12 TABLET, FILM COATED ORAL at 09:28

## 2024-05-23 RX ADMIN — SODIUM CHLORIDE, PRESERVATIVE FREE 10 ML: 5 INJECTION INTRAVENOUS at 09:32

## 2024-05-23 RX ADMIN — SODIUM CHLORIDE: 9 INJECTION, SOLUTION INTRAVENOUS at 15:44

## 2024-05-23 RX ADMIN — HYDROXYZINE HYDROCHLORIDE 50 MG: 25 TABLET, FILM COATED ORAL at 21:09

## 2024-05-23 ASSESSMENT — PAIN SCALES - GENERAL: PAINLEVEL_OUTOF10: 0

## 2024-05-23 NOTE — PROGRESS NOTES
ACUTE OCCUPATIONAL THERAPY GOALS:   (Developed with and agreed upon by patient and/or caregiver.)  1. Pt will complete LB ADL supervision with AE as needed.   2. Pt will complete colostomy management supervision with AE as needed.   3. Pt will tolerate at least 25 minutes of OT treatment requiring 1-2 breaks as needed.   4. Pt will complete grooming tasks while standing at sink CGA.  5. Pt will complete functional mobility and/or transfers via LRAD Min A.   6. Pt will tolerate BUE exercises to increase strength for safe, functional transfers and/or functional mobility, and ADL participation.     Timeframe: 7 days      OCCUPATIONAL THERAPY Initial Assessment, Daily Note, and PM       OT Visit Days: 1    Acknowledge Orders  Time  OT Charge Capture  Rehab Caseload Tracker      Peter Nolasco is a 78 y.o. male   PRIMARY DIAGNOSIS: Bacteremia due to Gram-negative bacteria  Hyponatremia [E87.1]  Physical debility [R53.81]  Fall, initial encounter [W19.XXXA]  Laceration of scalp, initial encounter [S01.01XA]  Acute weakness [R53.1]  Pressure injury of right lower back, stage 2 (HCC) [L89.132]       Reason for Referral: Generalized Muscle Weakness (M62.81)  Inpatient: Payor: MEDICARE / Plan: MEDICARE PART A AND B / Product Type: *No Product type* /     ASSESSMENT:     REHAB RECOMMENDATIONS:   Recommendation to date pending progress:  Setting:  Short-term Rehab    Equipment:    To Be Determined     ASSESSMENT:  Mr. Nolasco is a 78 y M with a hx of non-functional bladder with indwelling carr catheter, colostomy, bipolar disorder, falls.     Pt was admitted for physical debility associated with fall at home resulting in bruising to L flank and L flank/L shoulder pain. L shoulder x-ray pending. LUE treated as NWB until results have pended. Pt was received supine in bed on 2L O2 nasal cannula sleeping soundly but arousable. Pt required assistance for functional mobility and ADLs today due to L shoulder/flank pain, non-use

## 2024-05-23 NOTE — PROGRESS NOTES
ACUTE PHYSICAL THERAPY GOALS:   (Developed with and agreed upon by patient and/or caregiver.)  ST. Patient will perform bed mobility with MINIMAL ASSISTANCE within 3 days.  2. Patient will demonstrate GOOD DYNAMIC SITTING balance within 3 day(s).  3. Patient will transfer sit to stand with MINIMAL ASSISTANCE within 3 days.  4. Patient will transfer bed to chair with MODERATE ASSISTANCE within 3 days.  5. Patient will tolerate 15+ minutes of therapeutic activity/exercise and/or neuromuscular re-education while maintaining stable vitals to improve functional strength and activity tolerance within 3 days.    LT. Patient will perform bed mobility with CONTACT GUARD ASSISTANCE within 7 days.  2. Patient will transfer bed to chair with CONTACT GUARD ASSISTANCE within 7 days.  3. Patient will demonstrate FAIR DYNAMIC STANDING balance within 7 day(s).  4. Patient will ambulate 50ft+ using least restrictive assistive device and MINIMAL ASSISTANCE within 7 days.  5. Patient will tolerate 25+ minutes of therapeutic activity/exercise and/or neuromuscular re-education while maintaining stable vitals to improve functional strength and activity tolerance within 7 days.        PHYSICAL THERAPY Initial Assessment, Daily Note, and PM  (Link to Caseload Tracking: PT Visit Days : 1  Acknowledge Orders  Time In/Out  PT Charge Capture  Rehab Caseload Tracker    Peter Nolasco is a 78 y.o. male   PRIMARY DIAGNOSIS: Physical debility  Hyponatremia [E87.1]  Physical debility [R53.81]  Fall, initial encounter [W19.XXXA]  Laceration of scalp, initial encounter [S01.01XA]  Acute weakness [R53.1]  Pressure injury of right lower back, stage 2 (HCC) [L89.132]       Reason for Referral: Generalized Muscle Weakness (M62.81)  Difficulty in walking, Not elsewhere classified (R26.2)  History of falling (Z91.81)  Inpatient: Payor: MEDICARE / Plan: MEDICARE PART A AND B / Product Type: *No Product type* /     ASSESSMENT:     REHAB

## 2024-05-23 NOTE — WOUND CARE
Coccyx with soft eschar surrounding erythema boggy concern for deep infection, may need debridement noted future plans for hospice will start Dakin's moist 2x2's with pink silicone foam.     Each bony prominenc of spine mid back with light purple non blanchable areas that are open, DTI likely stage 2 once purple fades. Xeroform and silicone foam.      Skin moves easily ( very loose) mid upper back dark purple areas with some partial thickness open areas Also DTI, the skin falls over spine when not held taught, xeroform and silicone foam.     Left scapula with bruise and partial thickness skin loss, skin tear/ abrasion from fall at home, recommend xeroform and foam.      Left shoulder bruising and partial thickness skin loss, skin tear/ abrasion from fall at ome, recommend xeroform and foam.     Not pictured laceration with sutures on back of head, cleanse gently with normal bathing/ shampoo cap.  Leave open to air.     Not pictured skin tear abrasion on left arm silicone foam.     Is on low air loss mattress, recommend to have low air loss mattress for home/ SNF. as well. Patient has colostomy.  Difficult pouching at times, states he is out of product at home, if goes home will need help with order. Will use product on formulary while in acute care. Will monitor.

## 2024-05-23 NOTE — TELEPHONE ENCOUNTER
Alva Pandya called and states that she is the palliative care nurse for the patient and she found the patient laying in the floor from a fall bleeding and he had been there for 10 hours. Alva states that the patient is in the hospital and will be going to rehab after he is released. Alva states that the patient is wanting to put in hospice care when he is released from the hospital. Alva states that the patient is not able to live at home by his self any more.

## 2024-05-23 NOTE — TELEPHONE ENCOUNTER
Called and talked with Alva. She has been seeing the patient in the hospital and talking with the family. She thinks he will be going to a facility once he is discharged from rehab. But she will keep us informed.

## 2024-05-23 NOTE — PROGRESS NOTES
Pt kept turned Q2 hours. Pt ate well each meal. Pt was bathed and sheets changed.       Rounds performed throughout shift. Pt denies needs at this time. Bed in low position, locked and call light/personal items within reach. Will report to night shift nurse.

## 2024-05-23 NOTE — PROGRESS NOTES
Hospitalist Progress Note   Admit Date:  2024 11:19 AM   Name:  Peter Nolasco   Age:  78 y.o.  Sex:  male  :  1945   MRN:  [id number]   Room:  [facility]    Presenting/Chief Complaint: Fall     Reason(s) for Admission: Hyponatremia [E87.1]  Physical debility [R53.81]  Fall, initial encounter [W19.XXXA]  Laceration of scalp, initial encounter [S01.01XA]  Acute weakness [R53.1]  Pressure injury of right lower back, stage 2 (HCC) [L89.132]     Hospital Course:   Peetr Nolasco is an 78 y.o. CM w/ a PMH of rectal CA with ileostomy no longer on treatment, urostomies related to radiation, R ureteral obstruction from chronic pelvic adhesions now with R ureteral stent (to be changed 2024), non-functional bladder with chronic indwelling John, hypothyroidism, decubitus ulcer, debility, chronic slurred speech, bipolar, lower extremity weakness receiving IVIG, multiple abdominal surgeries, and SBO who presented to the ER due to head trauma after a fall last night.  Denies any syncope or dizziness to have caused the fall. States he just lost his balance.  CT head with no intracranial bleed.     Admitted to the Hospitalist service for further evaluation and treatment.       Subjective & 24hr Events:   Patient found bloodstream infection this morning and antibiotics started.  Blood cultures repeated.  The patient complains of significant left shoulder pain after falling on it thus will obtain XR left shoulder today.  Follow-up on RD recommendations.  Follow-up on PT/OT recommendations.    Assessment & Plan:     GNR bacteremia  - CXR non acute  - Source thought to be urine  - Follow I&S  - Start CTX 2 g dosing  - Repeat blood cultures    Debility   - Unsafe to go home.  Per notes, he was being set up with hospice outpatient / [facility] (but still FULL CODE).   - PT, OT consulted      Malnourished   - Consulted RD     Decubitus ulcer, unstageable  - Offloading     Bipolar

## 2024-05-23 NOTE — CONSULTS
Behavior    Discharge Planning:    Continue Oral Nutrition Supplement, Continue current diet    Teri Perez RD

## 2024-05-24 LAB
MM INDURATION, POC: 0 MM (ref 0–5)
MM INDURATION, POC: 0 MM (ref 0–5)
PPD, POC: NEGATIVE
PPD, POC: NEGATIVE

## 2024-05-24 PROCEDURE — 6370000000 HC RX 637 (ALT 250 FOR IP): Performed by: FAMILY MEDICINE

## 2024-05-24 PROCEDURE — 2700000000 HC OXYGEN THERAPY PER DAY

## 2024-05-24 PROCEDURE — 2580000003 HC RX 258: Performed by: FAMILY MEDICINE

## 2024-05-24 PROCEDURE — 1100000000 HC RM PRIVATE

## 2024-05-24 PROCEDURE — 6360000002 HC RX W HCPCS: Performed by: NURSE PRACTITIONER

## 2024-05-24 PROCEDURE — 6370000000 HC RX 637 (ALT 250 FOR IP): Performed by: INTERNAL MEDICINE

## 2024-05-24 PROCEDURE — 97530 THERAPEUTIC ACTIVITIES: CPT

## 2024-05-24 PROCEDURE — 2580000003 HC RX 258: Performed by: NURSE PRACTITIONER

## 2024-05-24 PROCEDURE — 51702 INSERT TEMP BLADDER CATH: CPT

## 2024-05-24 RX ADMIN — CARVEDILOL 6.25 MG: 3.12 TABLET, FILM COATED ORAL at 09:28

## 2024-05-24 RX ADMIN — SODIUM CHLORIDE, PRESERVATIVE FREE 10 ML: 5 INJECTION INTRAVENOUS at 20:31

## 2024-05-24 RX ADMIN — HYDROXYZINE HYDROCHLORIDE 50 MG: 25 TABLET, FILM COATED ORAL at 09:28

## 2024-05-24 RX ADMIN — CARVEDILOL 6.25 MG: 3.12 TABLET, FILM COATED ORAL at 17:26

## 2024-05-24 RX ADMIN — LEVOTHYROXINE SODIUM 125 MCG: 0.07 TABLET ORAL at 05:04

## 2024-05-24 RX ADMIN — TROSPIUM CHLORIDE 20 MG: 20 TABLET, FILM COATED ORAL at 20:33

## 2024-05-24 RX ADMIN — OLANZAPINE 5 MG: 5 TABLET, FILM COATED ORAL at 20:33

## 2024-05-24 RX ADMIN — AMITRIPTYLINE HYDROCHLORIDE 100 MG: 50 TABLET, FILM COATED ORAL at 20:33

## 2024-05-24 RX ADMIN — SODIUM CHLORIDE, PRESERVATIVE FREE 10 ML: 5 INJECTION INTRAVENOUS at 09:30

## 2024-05-24 RX ADMIN — HYDROXYZINE HYDROCHLORIDE 50 MG: 25 TABLET, FILM COATED ORAL at 20:33

## 2024-05-24 RX ADMIN — CLONAZEPAM 2 MG: 1 TABLET ORAL at 20:33

## 2024-05-24 RX ADMIN — DAKIN'S SOLUTION 0.125% (QUARTER STRENGTH): 0.12 SOLUTION at 13:48

## 2024-05-24 RX ADMIN — CEFTRIAXONE SODIUM 2000 MG: 2 INJECTION, POWDER, FOR SOLUTION INTRAMUSCULAR; INTRAVENOUS at 13:47

## 2024-05-24 ASSESSMENT — PAIN SCALES - GENERAL
PAINLEVEL_OUTOF10: 0
PAINLEVEL_OUTOF10: 0

## 2024-05-24 NOTE — PROGRESS NOTES
ACUTE PHYSICAL THERAPY GOALS:   (Developed with and agreed upon by patient and/or caregiver.)  ST. Patient will perform bed mobility with MINIMAL ASSISTANCE within 3 days.  2. Patient will demonstrate GOOD DYNAMIC SITTING balance within 3 day(s).  3. Patient will transfer sit to stand with MINIMAL ASSISTANCE within 3 days.  4. Patient will transfer bed to chair with MODERATE ASSISTANCE within 3 days.  5. Patient will tolerate 15+ minutes of therapeutic activity/exercise and/or neuromuscular re-education while maintaining stable vitals to improve functional strength and activity tolerance within 3 days.     LT. Patient will perform bed mobility with CONTACT GUARD ASSISTANCE within 7 days.  2. Patient will transfer bed to chair with CONTACT GUARD ASSISTANCE within 7 days.  3. Patient will demonstrate FAIR DYNAMIC STANDING balance within 7 day(s).  4. Patient will ambulate 50ft+ using least restrictive assistive device and MINIMAL ASSISTANCE within 7 days.  5. Patient will tolerate 25+ minutes of therapeutic activity/exercise and/or neuromuscular re-education while maintaining stable vitals to improve functional strength and activity tolerance within 7 days.       PHYSICAL THERAPY: Daily Note AM   (Link to Caseload Tracking: PT Visit Days : 2  Time In/Out PT Charge Capture  Rehab Caseload Tracker  Orders    Peter Nolasco is a 78 y.o. male   PRIMARY DIAGNOSIS: Bacteremia due to Gram-negative bacteria  Hyponatremia [E87.1]  Physical debility [R53.81]  Fall, initial encounter [W19.XXXA]  Laceration of scalp, initial encounter [S01.01XA]  Acute weakness [R53.1]  Pressure injury of right lower back, stage 2 (HCC) [L89.132]       Inpatient: Payor: MEDICARE / Plan: MEDICARE PART A AND B / Product Type: *No Product type* /     ASSESSMENT:     REHAB RECOMMENDATIONS:   Recommendation to date pending progress:  Setting:  Short-term Rehab    Equipment:    To Be Determined     ASSESSMENT:  Mr. Nolasco tolerated

## 2024-05-24 NOTE — PROGRESS NOTES
Physician Progress Note      PATIENT:               DINA MATTHEWS  CSN #:                  [id number]  :                       1945  ADMIT DATE:       2024 11:19 AM  DISCH DATE:  RESPONDING  PROVIDER #:        Santiago Lazaro NP          QUERY TEXT:    Pt admitted with debility.  Pt noted to have chronic indwelling urinary   catheter. If possible, please document in the progress notes and discharge   summary if you are evaluating and/or treating any of the following:    The medical record reflects the following:  Risk Factors: H/O rectal CA with ileostomy no longer on treatment, urostomies   related to radiation, right ureteral obstruction from chronic pelvic adhesions   now with right ureteral stent ( to be changed 2024) , nonfunctional bladder   with chronic indwelling John, hypothyroidism, decubitus ulcer, debility  Clinical Indicators: Chronic indwelling John catheter, UA abnormal, GNR   bacteremia  Treatment: CTX  Options provided:  -- UTI due to chronic indwelling urinary catheter  -- UTI not due to indwelling urinary catheter  -- Other - I will add my own diagnosis  -- Disagree - Not applicable / Not valid  -- Disagree - Clinically unable to determine / Unknown  -- Refer to Clinical Documentation Reviewer    PROVIDER RESPONSE TEXT:    UTI is due to the chronic indwelling urinary catheter.    Query created by: Lakisha Sears on 2024 11:25 AM      Electronically signed by:  Santiago Lazaro NP 2024 12:38 PM

## 2024-05-24 NOTE — PROGRESS NOTES
Hospitalist Progress Note   Admit Date:  2024 11:19 AM   Name:  Peter Nolasco   Age:  78 y.o.  Sex:  male  :  1945   MRN:  [id number]   Room:  [facility]/    Presenting/Chief Complaint: Fall     Reason(s) for Admission: Hyponatremia [E87.1]  Physical debility [R53.81]  Fall, initial encounter [W19.XXXA]  Laceration of scalp, initial encounter [S01.01XA]  Acute weakness [R53.1]  Pressure injury of right lower back, stage 2 (HCC) [L89.132]     Hospital Course:   Peter Nolasco is an 78 y.o. CM w/ a PMH of rectal CA with ileostomy no longer on treatment, urostomies related to radiation, R ureteral obstruction from chronic pelvic adhesions now with R ureteral stent (to be changed 2024), non-functional bladder with chronic indwelling John, hypothyroidism, decubitus ulcer, debility, chronic slurred speech, bipolar, lower extremity weakness receiving IVIG, multiple abdominal surgeries, and SBO who presented to the ER due to head trauma after a fall last night.  Denies any syncope or dizziness to have caused the fall. States he just lost his balance.  CT head with no intracranial bleed.     Admitted to the Hospitalist service for further evaluation and treatment.     PT/OT recommend SNF.    Subjective & 24hr Events:   No AEO.  Patient seen this morning, he is sitting up and eating breakfast.  No new complaints.  No fracture detected at left shoulder or left-sided ribs.  Continue to treat infection and discharge when SNF bed obtained.    Assessment & Plan:     GNR bacteremia  - CXR non acute  - Urine culture w/ GNR  - Follow I&S of cultures  - Continue CTX 2 g dosing  - Repeat blood cultures NGTD    Debility   - Unsafe to go home.  Per notes, he was being set up with hospice outpatient w/ [facility] (but still FULL CODE).   - PT, OT consulted and recommend SNF     Severe malnutrition  - Consulted RD     Decubitus ulcer, unstageable  - Offloading     Bipolar d/o  -

## 2024-05-25 LAB
ANION GAP SERPL CALC-SCNC: 4 MMOL/L (ref 9–18)
BACTERIA SPEC CULT: ABNORMAL
BUN SERPL-MCNC: 26 MG/DL (ref 8–23)
CALCIUM SERPL-MCNC: 8.5 MG/DL (ref 8.8–10.2)
CHLORIDE SERPL-SCNC: 102 MMOL/L (ref 98–107)
CO2 SERPL-SCNC: 32 MMOL/L (ref 20–28)
CREAT SERPL-MCNC: 1.04 MG/DL (ref 0.8–1.3)
ERYTHROCYTE [DISTWIDTH] IN BLOOD BY AUTOMATED COUNT: 13.8 % (ref 11.9–14.6)
GLUCOSE SERPL-MCNC: 91 MG/DL (ref 70–99)
GRAM STN SPEC: ABNORMAL
HCT VFR BLD AUTO: 23.5 % (ref 41.1–50.3)
HGB BLD-MCNC: 7.2 G/DL (ref 13.6–17.2)
MCH RBC QN AUTO: 27.8 PG (ref 26.1–32.9)
MCHC RBC AUTO-ENTMCNC: 30.6 G/DL (ref 31.4–35)
MCV RBC AUTO: 90.7 FL (ref 82–102)
NRBC # BLD: 0 K/UL (ref 0–0.2)
PLATELET # BLD AUTO: 129 K/UL (ref 150–450)
PMV BLD AUTO: 8.7 FL (ref 9.4–12.3)
POTASSIUM SERPL-SCNC: 4.1 MMOL/L (ref 3.5–5.1)
RBC # BLD AUTO: 2.59 M/UL (ref 4.23–5.6)
SERVICE CMNT-IMP: ABNORMAL
SERVICE CMNT-IMP: ABNORMAL
SODIUM SERPL-SCNC: 139 MMOL/L (ref 136–145)
WBC # BLD AUTO: 4.1 K/UL (ref 4.3–11.1)

## 2024-05-25 PROCEDURE — 2580000003 HC RX 258: Performed by: FAMILY MEDICINE

## 2024-05-25 PROCEDURE — 1100000000 HC RM PRIVATE

## 2024-05-25 PROCEDURE — 6360000002 HC RX W HCPCS: Performed by: NURSE PRACTITIONER

## 2024-05-25 PROCEDURE — 36415 COLL VENOUS BLD VENIPUNCTURE: CPT

## 2024-05-25 PROCEDURE — 2580000003 HC RX 258: Performed by: NURSE PRACTITIONER

## 2024-05-25 PROCEDURE — 80048 BASIC METABOLIC PNL TOTAL CA: CPT

## 2024-05-25 PROCEDURE — 85027 COMPLETE CBC AUTOMATED: CPT

## 2024-05-25 PROCEDURE — 6370000000 HC RX 637 (ALT 250 FOR IP): Performed by: FAMILY MEDICINE

## 2024-05-25 RX ADMIN — HYDROXYZINE HYDROCHLORIDE 50 MG: 25 TABLET, FILM COATED ORAL at 22:15

## 2024-05-25 RX ADMIN — SODIUM CHLORIDE, PRESERVATIVE FREE 10 ML: 5 INJECTION INTRAVENOUS at 10:41

## 2024-05-25 RX ADMIN — SODIUM CHLORIDE, PRESERVATIVE FREE 10 ML: 5 INJECTION INTRAVENOUS at 22:15

## 2024-05-25 RX ADMIN — CARVEDILOL 6.25 MG: 3.12 TABLET, FILM COATED ORAL at 17:13

## 2024-05-25 RX ADMIN — HYDROXYZINE HYDROCHLORIDE 50 MG: 25 TABLET, FILM COATED ORAL at 10:40

## 2024-05-25 RX ADMIN — CLONAZEPAM 2 MG: 1 TABLET ORAL at 22:15

## 2024-05-25 RX ADMIN — OLANZAPINE 5 MG: 5 TABLET, FILM COATED ORAL at 22:15

## 2024-05-25 RX ADMIN — AMITRIPTYLINE HYDROCHLORIDE 100 MG: 50 TABLET, FILM COATED ORAL at 22:15

## 2024-05-25 RX ADMIN — TROSPIUM CHLORIDE 20 MG: 20 TABLET, FILM COATED ORAL at 22:15

## 2024-05-25 RX ADMIN — CARVEDILOL 6.25 MG: 3.12 TABLET, FILM COATED ORAL at 10:40

## 2024-05-25 RX ADMIN — DAKIN'S SOLUTION 0.125% (QUARTER STRENGTH): 0.12 SOLUTION at 10:41

## 2024-05-25 RX ADMIN — LEVOTHYROXINE SODIUM 125 MCG: 0.07 TABLET ORAL at 05:07

## 2024-05-25 RX ADMIN — CEFTRIAXONE SODIUM 2000 MG: 2 INJECTION, POWDER, FOR SOLUTION INTRAMUSCULAR; INTRAVENOUS at 15:19

## 2024-05-25 ASSESSMENT — PAIN SCALES - GENERAL
PAINLEVEL_OUTOF10: 0
PAINLEVEL_OUTOF10: 0

## 2024-05-25 NOTE — PROGRESS NOTES
Hospitalist Progress Note   Admit Date:  2024 11:19 AM   Name:  Peter Nolasco   Age:  78 y.o.  Sex:  male  :  1945   MRN:  [id number]   Room:  [facility] [place]    Presenting/Chief Complaint: Fall     Reason(s) for Admission: Hyponatremia [E87.1]  Physical debility [R53.81]  Fall, initial encounter [W19.XXXA]  Laceration of scalp, initial encounter [S01.01XA]  Acute weakness [R53.1]  Pressure injury of right lower back, stage 2 (HCC) [L89.132]     Hospital Course:   Peter Nolasco is an 78 y.o. CM w/ a PMH of rectal CA with ileostomy no longer on treatment, urostomies related to radiation, R ureteral obstruction from chronic pelvic adhesions now with R ureteral stent (to be changed 2024), non-functional bladder with chronic indwelling John, hypothyroidism, decubitus ulcer, debility, chronic slurred speech, bipolar, lower extremity weakness receiving IVIG, multiple abdominal surgeries, and SBO who presented to the ER due to head trauma after a fall last night.  Denies any syncope or dizziness to have caused the fall. States he just lost his balance.  CT head with no intracranial bleed.     Today, no fever or ac events    Assessment & Plan:     E coli bacteremia, pan-sensitive, s/p ureteric stent  Rocephin iv     Severe malnutrition  - Consulted RD     Decubitus ulcer, unstageable  - Offloading     Bipolar d/o  - Holding his PRN Seroquel.  Prolonged QTc.  - Remeron, zyprexa, amitriptyline.      Hypothyroidism   - Synthroid      HTN   - carvedilol 6.25 mg BID     Prolonged QTc   - remote tele.  Holding his PRN seroquel.       Ileostomy and R utreteral stent status   - noted     Dispo; rehab when available  I spoke to CM    Objective:   Patient Vitals for the past 24 hrs:   Temp Pulse Resp BP SpO2   24 1211 98.1 °F (36.7 °C) 92 18 103/63 97 %   24 0600 98 °F (36.7 °C) 79 18 104/60 97 %   24 0500 98 °F (36.7 °C) 79 18 104/60 --   24 0030 98.1 °F (36.7 °C) 75 18 (!) 100/55 97

## 2024-05-25 NOTE — FLOWSHEET NOTE
Ostomy changed per hospital formulary as pt does not have personal ostomy supplies here. Wound care left note to use hospital supplies while in acute care.

## 2024-05-25 NOTE — PROGRESS NOTES
Problem: Patient Care Overview  Goal: Plan of Care Review  Patient remains on Heparin drip at 16.3cc/hr; therapeutic at 0.45 this am. Glucoses have ranged between 110- 121mg/dl. Resp cx pending. CTS consult pending for parapneumonic abscess. SNF consult pending. Kidney ultrasound pending.       Pt resting, denies needs at this time.  Experiences some intermittent confusion.  Hourly rounds completed.  Bed locked and lowered into lowest position.  Call light and personal items within reach.  Will give report to oncoming nurse.

## 2024-05-26 VITALS
HEART RATE: 96 BPM | OXYGEN SATURATION: 97 % | SYSTOLIC BLOOD PRESSURE: 117 MMHG | WEIGHT: 114 LBS | HEIGHT: 68 IN | TEMPERATURE: 97.9 F | RESPIRATION RATE: 20 BRPM | BODY MASS INDEX: 17.28 KG/M2 | DIASTOLIC BLOOD PRESSURE: 73 MMHG

## 2024-05-26 LAB
BACTERIA SPEC CULT: ABNORMAL
BACTERIA SPEC CULT: ABNORMAL
SERVICE CMNT-IMP: ABNORMAL

## 2024-05-26 PROCEDURE — 2580000003 HC RX 258: Performed by: FAMILY MEDICINE

## 2024-05-26 PROCEDURE — 6360000002 HC RX W HCPCS: Performed by: HOSPITALIST

## 2024-05-26 PROCEDURE — 6370000000 HC RX 637 (ALT 250 FOR IP): Performed by: FAMILY MEDICINE

## 2024-05-26 RX ORDER — HYDROCODONE BITARTRATE AND ACETAMINOPHEN 5; 325 MG/1; MG/1
1 TABLET ORAL EVERY 8 HOURS PRN
Qty: 10 TABLET | Refills: 0 | Status: SHIPPED | OUTPATIENT
Start: 2024-05-26 | End: 2024-06-02

## 2024-05-26 RX ORDER — MORPHINE SULFATE 2 MG/ML
2 INJECTION, SOLUTION INTRAMUSCULAR; INTRAVENOUS ONCE
Status: COMPLETED | OUTPATIENT
Start: 2024-05-26 | End: 2024-05-26

## 2024-05-26 RX ORDER — CLONAZEPAM 2 MG/1
4 TABLET ORAL NIGHTLY PRN
Qty: 6 TABLET | Refills: 0 | Status: SHIPPED | OUTPATIENT
Start: 2024-05-26 | End: 2024-05-29

## 2024-05-26 RX ADMIN — DAKIN'S SOLUTION 0.125% (QUARTER STRENGTH): 0.12 SOLUTION at 08:53

## 2024-05-26 RX ADMIN — ACETAMINOPHEN 650 MG: 325 TABLET ORAL at 09:08

## 2024-05-26 RX ADMIN — LEVOTHYROXINE SODIUM 125 MCG: 0.07 TABLET ORAL at 06:16

## 2024-05-26 RX ADMIN — MORPHINE SULFATE 2 MG: 2 INJECTION, SOLUTION INTRAMUSCULAR; INTRAVENOUS at 10:34

## 2024-05-26 RX ADMIN — SODIUM CHLORIDE, PRESERVATIVE FREE 10 ML: 5 INJECTION INTRAVENOUS at 08:53

## 2024-05-26 RX ADMIN — HYDROXYZINE HYDROCHLORIDE 50 MG: 25 TABLET, FILM COATED ORAL at 08:53

## 2024-05-26 RX ADMIN — CARVEDILOL 6.25 MG: 3.12 TABLET, FILM COATED ORAL at 08:53

## 2024-05-26 ASSESSMENT — PAIN DESCRIPTION - DESCRIPTORS
DESCRIPTORS: SORE
DESCRIPTORS: SORE

## 2024-05-26 ASSESSMENT — PAIN SCALES - GENERAL
PAINLEVEL_OUTOF10: 3
PAINLEVEL_OUTOF10: 3
PAINLEVEL_OUTOF10: 5
PAINLEVEL_OUTOF10: 5

## 2024-05-26 ASSESSMENT — PAIN DESCRIPTION - LOCATION
LOCATION: SHOULDER
LOCATION: SHOULDER;GENERALIZED

## 2024-05-26 ASSESSMENT — PAIN DESCRIPTION - ORIENTATION
ORIENTATION: LEFT
ORIENTATION: LEFT

## 2024-05-26 NOTE — DISCHARGE SUMMARY
[unfilled]    Physician Discharge Summary     Patient ID:  Peter Nolasco  [id number]  78 y.o.  1945    Admit date: 5/22/2024    Discharge date: 5-    Diagnosis:    1- E coli bacteremia, pan-sensitive, s/p right ureteric stent and ileostomy.  Rocephin iv to finish course.  Surgical wounds care per Nursing instructions.  2- Severe malnutrition  - Consulted RD  3- Decubitus ulcer, unstageable  - Offloading  4- Bipolar d/o, stable  5- Hypothyroidism, stable.  6- HTN, controlled without medication at this time.  7- Prolonged QTc       Hospital course:   Per HPI:  78 y.o. CM w/ a PMH of rectal CA with ileostomy no longer on treatment, urostomies related to radiation, R ureteral obstruction from chronic pelvic adhesions now with R ureteral stent (to be changed 6/2024), non-functional bladder with chronic indwelling John, hypothyroidism, decubitus ulcer, debility, chronic slurred speech, bipolar, lower extremity weakness receiving IVIG, multiple abdominal surgeries, and SBO who presented to the ER due to head trauma after a fall last night. Denies any syncope or dizziness to have caused the fall. States he just lost his balance. CT head with no intracranial bleed.   Patient admitted and treated as above.  On day of discharge, patient exam showed ill looking patient, mild apllor, no fever, normal abdominal exam.    PCP: Bucky Armenta, DO    Patient Instructions:   Current Discharge Medication List        START taking these medications    Details   HYDROcodone-acetaminophen (NORCO) 5-325 MG per tablet Take 1 tablet by mouth every 8 hours as needed for Pain for up to 7 days. Intended supply: 3 days. Take lowest dose possible to manage pain Max Daily Amount: 3 tablets  Qty: 10 tablet, Refills: 0    Comments: Reduce doses taken as pain becomes manageable  Associated Diagnoses: Laceration of scalp, initial encounter           CONTINUE these medications which have CHANGED    Details   [email or web address]

## 2024-05-26 NOTE — CARE COORDINATION
Dispo update:  ADRIANA and Ms. Alva Benavides of [facility] ([phone]) spoke to Mr. Nolasco in room 612 about discharge planning (he is very hard to understand).  From [facility], he agrees to referrals to [facility].  Referrals sent via Epic.  He is traditional Medicare, so just needs 3 midnight inpatient stay.      We also discussed that he may not be safe to return home to his townhouse and live alone.  We also talked about private pay at the SNF, with possible hospice services from [facility].  That is in the future, but now it has been discussed for him to think about. CM will contiue to follow.      Addendum at 16:18:  Mr. Nolasco agrees to accept rehab bed offer from [facility]  ADRIANA updated Ms. Africa Schulte at [facility] and also Ms. Alva Benavides of [facility].    
Dispo update:  CM spoke to Ms. Alva Benavides, NP Palliative Care Practitioner with [facility] and Pall[facility] Care ([phone].  They were seeing Mr. Nolasco at home prior to his fall and admit to [facility].      She said that he lives alone in a two story town home, with the bedrooms upstairs.  They were in the process of admitting him to home hospice.  OT and PT evals are pending here.  She requested that CM's keep her updated on his progress and disposition.    
MSW spoke with Dr. Park regarding pt discharged to SNF with rocephin IV for 3 days. MSW sent message to [facility] liaison, waiting on response about IV abx at facility.  
Nursing Facility   DME Ordered? No   Potential Assistance Purchasing Medications No   Patient expects to be discharged to: Unknown   One/Two Story Residence Two story   # of Interior Steps 13   Lift Chair Available No   History of falls? 1   Services At/After Discharge   Transition of Care Consult (CM Consult) Discharge Planning;SNF   Partner SNF Yes   Services At/After Discharge Skilled Nursing Facility (SNF)    Resource Information Provided? No   Mode of Transport at Discharge BLS  ([employer])   Hospital Transport Time of Discharge 1200   Confirm Follow Up Transport Family   Condition of Participation: Discharge Planning   The Plan for Transition of Care is related to the following treatment goals: Pt will discharge to SNF for rehab.   The Patient and/or Patient Representative was provided with a Choice of Provider? Patient   The Patient and/Or Patient Representative agree with the Discharge Plan? Yes   Freedom of Choice list was provided with basic dialogue that supports the patient's individualized plan of care/goals, treatment preferences, and shares the quality data associated with the providers?  Yes          
With Alone   Type of Home Apartment  (town home)   Home Layout Two level   Home Access Level entry   Bathroom Shower/Tub Tub/Shower unit   Bathroom Toilet Standard   Bathroom Equipment Grab bars in shower  (stool in shower)   Bathroom Accessibility Accessible   Home Equipment Walker - Rolling   Receives Help From Neighbor   ADL Assistance Independent   Homemaking Assistance Independent   Ambulation Assistance Independent   Transfer Assistance Independent   Active  Yes   Occupation Retired   Discharge Planning   Type of Residence Other (Comment)  (unknown SNF vs home with HH)   Living Arrangements Alone   Current Services Prior To Admission Other (Comment)  (Palliative care)   Potential Assistance Needed Skilled Nursing Facility   DME Ordered? No   Potential Assistance Purchasing Medications No   Patient expects to be discharged to: Unknown   One/Two Story Residence Two story   # of Interior Steps 13   Lift Chair Available No   History of falls? 1   Services At/After Discharge   Transition of Care Consult (CM Consult) Discharge Planning;SNF   Services At/After Discharge Home Health;Skilled Nursing Facility (SNF)

## 2024-05-26 NOTE — PROGRESS NOTES
TRANSFER - OUT REPORT:    Verbal report given to Izairene on Peter Nolasco  being transferred to [facility] for routine progression of patient care       Report consisted of patient's Situation, Background, Assessment and   Recommendations(SBAR).     Information from the following report(s) Nurse Handoff Report, Adult Overview, MAR, and Recent Results was reviewed with the receiving nurse.       Pt leaving with chronic carr and IV for IV ABX.    Lines:   Peripheral IV 05/24/24 Posterior;Right Hand (Active)   Site Assessment Clean, dry & intact 05/26/24 0747   Line Status Capped;Normal saline locked 05/26/24 0747   Line Care Connections checked and tightened 05/26/24 0747   Phlebitis Assessment No symptoms 05/26/24 0747   Infiltration Assessment 0 05/26/24 0747   Alcohol Cap Used Yes 05/26/24 0747   Dressing Status Clean, dry & intact 05/26/24 0747   Dressing Type Transparent 05/26/24 0747        Opportunity for questions and clarification was provided.      Patient will be transported with:  [employer]

## 2024-05-26 NOTE — PLAN OF CARE
Problem: Discharge Planning  Goal: Discharge to home or other facility with appropriate resources  5/26/2024 0728 by Smitha Natarajan RN  Outcome: Progressing  5/25/2024 2316 by Anastasiia Lynne RN  Outcome: Progressing  5/25/2024 2316 by Anastasiia Lynne RN  Outcome: Progressing  Flowsheets (Taken 5/25/2024 1951)  Discharge to home or other facility with appropriate resources:   Identify barriers to discharge with patient and caregiver   Arrange for needed discharge resources and transportation as appropriate   Identify discharge learning needs (meds, wound care, etc)   Arrange for interpreters to assist at discharge as needed     Problem: Pain  Goal: Verbalizes/displays adequate comfort level or baseline comfort level  5/26/2024 0728 by Smitha Natarajan RN  Outcome: Progressing  5/25/2024 2316 by Anastasiia Lynne RN  Outcome: Progressing  5/25/2024 2316 by Anastasiia Lynne RN  Outcome: Progressing     Problem: Skin/Tissue Integrity  Goal: Absence of new skin breakdown  Description: 1.  Monitor for areas of redness and/or skin breakdown  2.  Assess vascular access sites hourly  3.  Every 4-6 hours minimum:  Change oxygen saturation probe site  4.  Every 4-6 hours:  If on nasal continuous positive airway pressure, respiratory therapy assess nares and determine need for appliance change or resting period.  5/26/2024 0728 by Smitha Natarajan, RN  Outcome: Progressing  5/25/2024 2316 by Anastasiia Lynne RN  Outcome: Progressing  5/25/2024 2316 by Anastasiia Lynne RN  Outcome: Progressing     Problem: Safety - Adult  Goal: Free from fall injury  5/26/2024 0728 by Smitha Natarajan, RN  Outcome: Progressing  5/25/2024 2316 by Anastasiia Lynne RN  Outcome: Progressing  5/25/2024 2316 by Anastasiia Lynne RN  Outcome: Progressing

## 2024-05-26 NOTE — PROGRESS NOTES
Dressings changed,   VSS:   Vitals:    05/25/24 2345   BP: 126/68   Pulse: 86   Resp: 16   Temp: 98.4 °F (36.9 °C)   SpO2: 95%     Patient a bit anxious tonight and hasn't slept well despite sleep meds/anxiety meds.     Patient is [id number],   John care completed and ostomy remains intact with small amount of brown output noted in bag.     Intermittent confusion noted (not new).     Needs in reach, bed locked and lowered

## 2024-05-26 NOTE — PLAN OF CARE
Problem: Discharge Planning  Goal: Discharge to home or other facility with appropriate resources  5/25/2024 2316 by Anastasiia Lynne RN  Outcome: Progressing  Flowsheets (Taken 5/25/2024 1951)  Discharge to home or other facility with appropriate resources:   Identify barriers to discharge with patient and caregiver   Arrange for needed discharge resources and transportation as appropriate   Identify discharge learning needs (meds, wound care, etc)   Arrange for interpreters to assist at discharge as needed  5/25/2024 1557 by Rebecca Villela, RN  Outcome: Progressing     Problem: Pain  Goal: Verbalizes/displays adequate comfort level or baseline comfort level  5/25/2024 2316 by Anastasiia Lynne RN  Outcome: Progressing  5/25/2024 1557 by Rebecca Villela RN  Outcome: Progressing     Problem: Skin/Tissue Integrity  Goal: Absence of new skin breakdown  Description: 1.  Monitor for areas of redness and/or skin breakdown  2.  Assess vascular access sites hourly  3.  Every 4-6 hours minimum:  Change oxygen saturation probe site  4.  Every 4-6 hours:  If on nasal continuous positive airway pressure, respiratory therapy assess nares and determine need for appliance change or resting period.  Outcome: Progressing     Problem: Safety - Adult  Goal: Free from fall injury  5/25/2024 2316 by Anastasiia Lynne RN  Outcome: Progressing  5/25/2024 1557 by Rebecca Villela RN  Outcome: Progressing

## 2024-05-26 NOTE — PLAN OF CARE
Problem: Discharge Planning  Goal: Discharge to home or other facility with appropriate resources  5/25/2024 2316 by Anastasiia Lynne RN  Outcome: Progressing  5/25/2024 2316 by Anastasiia Lynne RN  Outcome: Progressing  Flowsheets (Taken 5/25/2024 1951)  Discharge to home or other facility with appropriate resources:   Identify barriers to discharge with patient and caregiver   Arrange for needed discharge resources and transportation as appropriate   Identify discharge learning needs (meds, wound care, etc)   Arrange for interpreters to assist at discharge as needed  5/25/2024 1557 by Rebecca Villela RN  Outcome: Progressing     Problem: Pain  Goal: Verbalizes/displays adequate comfort level or baseline comfort level  5/25/2024 2316 by Anastasiia Lynne RN  Outcome: Progressing  5/25/2024 2316 by Anastasiia Lynne RN  Outcome: Progressing  5/25/2024 1557 by Rebecca Villela RN  Outcome: Progressing     Problem: Skin/Tissue Integrity  Goal: Absence of new skin breakdown  Description: 1.  Monitor for areas of redness and/or skin breakdown  2.  Assess vascular access sites hourly  3.  Every 4-6 hours minimum:  Change oxygen saturation probe site  4.  Every 4-6 hours:  If on nasal continuous positive airway pressure, respiratory therapy assess nares and determine need for appliance change or resting period.  5/25/2024 2316 by Anastasiia Lynne RN  Outcome: Progressing  5/25/2024 2316 by Anastasiia Lynne RN  Outcome: Progressing     Problem: Safety - Adult  Goal: Free from fall injury  5/25/2024 2316 by Anastasiia Lynne RN  Outcome: Progressing  5/25/2024 2316 by Anastasiia Lynne RN  Outcome: Progressing  5/25/2024 1557 by Rebecca Villela RN  Outcome: Progressing

## 2024-05-28 ENCOUNTER — CARE COORDINATION (OUTPATIENT)
Dept: CARE COORDINATION | Facility: CLINIC | Age: 79
End: 2024-05-28

## 2024-06-11 ENCOUNTER — CARE COORDINATION (OUTPATIENT)
Dept: CARE COORDINATION | Facility: CLINIC | Age: 79
End: 2024-06-11

## 2024-06-11 NOTE — CARE COORDINATION
Care Transitions Post-Acute Facility Update Call    2024    Patient: Peter Nolasoc Patient : 1945   MRN: [id number]  Reason for Admission: Bacteremia due to Gram-negative bacteria     Discharge Date: 24 RARS: Readmission Risk Score: 25.9  Spoke with Oscar Staff @ [facility] and patient is currently still @ the facility with no tentative discharge. Will continue to monitor patient for discharge.       Care Transitions Post Acute Facility Update    Care Transitions Interventions      Post Acute Facility Update   Post Acute Facility: [facility]          Nursing       Rehab/Functional       SW/Discharge Planning

## 2024-06-14 ENCOUNTER — NURSE ONLY (OUTPATIENT)
Dept: UROLOGY | Age: 79
End: 2024-06-14

## 2024-06-14 DIAGNOSIS — N31.9 NEUROGENIC BLADDER: Primary | ICD-10-CM

## 2024-06-14 DIAGNOSIS — Z46.6 URINARY CATHETER CHANGE REQUIRED: ICD-10-CM

## 2024-06-18 ENCOUNTER — CARE COORDINATION (OUTPATIENT)
Dept: CARE COORDINATION | Facility: CLINIC | Age: 79
End: 2024-06-18

## 2024-06-18 NOTE — CARE COORDINATION
Care Transitions Post-Acute Facility Update Call    2024    Patient: Peter Nolasco Patient : 1945   MRN: [id number]  Reason for Admission: Bacteremia Gram-negative  Discharge Date: 24 RARS: Readmission Risk Score: 25.9  Unable to reach staff @ [facility] facility, left voice message and contact information. Will attempt to contact next business day.       Care Transitions Post Acute Facility Update    Care Transitions Interventions      Post Acute Facility Update   Post Acute Facility: [facility]          Nursing       Rehab/Functional       SW/Discharge Planning

## 2024-06-19 ENCOUNTER — CARE COORDINATION (OUTPATIENT)
Dept: CARE COORDINATION | Facility: CLINIC | Age: 79
End: 2024-06-19

## 2024-06-19 NOTE — CARE COORDINATION
Care Transitions Post-Acute Facility Update Call    2024    Patient: Peter Nolasco Patient : 1945   MRN: [id number]  Reason for Admission: Bacteremia Gram-negative  Discharge Date: 24 RARS: Readmission Risk Score: 25.9  Spoke with Oscar  staff @ [facility], states patient is currently still @ the facility , with no tentative discharge at this time.  Patient is scheduled for surgery 2024. Will continue to monitor for d/c information.       Care Transitions Post Acute Facility Update    Care Transitions Interventions      Post Acute Facility Update   Post Acute Facility: [facility]          Nursing       Rehab/Functional       SW/Discharge Planning

## 2024-06-20 RX ORDER — LANOLIN ALCOHOL/MO/W.PET/CERES
3 CREAM (GRAM) TOPICAL EVERY EVENING
COMMUNITY

## 2024-06-20 RX ORDER — OLANZAPINE 2.5 MG/1
2.5 TABLET, FILM COATED ORAL NIGHTLY
COMMUNITY

## 2024-06-20 RX ORDER — ACETAMINOPHEN 325 MG/1
650 TABLET ORAL EVERY 8 HOURS PRN
COMMUNITY

## 2024-06-20 RX ORDER — SENNA AND DOCUSATE SODIUM 50; 8.6 MG/1; MG/1
2 TABLET, FILM COATED ORAL
COMMUNITY

## 2024-06-20 RX ORDER — CARVEDILOL 6.25 MG/1
6.25 TABLET ORAL DAILY
COMMUNITY

## 2024-06-20 RX ORDER — QUETIAPINE FUMARATE 25 MG/1
25 TABLET, FILM COATED ORAL
COMMUNITY

## 2024-06-20 RX ORDER — CLONAZEPAM 2 MG/1
4 TABLET ORAL NIGHTLY PRN
COMMUNITY

## 2024-06-20 RX ORDER — HYDROCODONE BITARTRATE AND ACETAMINOPHEN 5; 325 MG/1; MG/1
1 TABLET ORAL EVERY 8 HOURS PRN
Status: ON HOLD | COMMUNITY
End: 2024-06-27

## 2024-06-20 RX ORDER — AMITRIPTYLINE HYDROCHLORIDE 100 MG/1
100 TABLET ORAL NIGHTLY
COMMUNITY

## 2024-06-20 NOTE — PERIOP NOTE
Pre-Assessment completed with medical records provided from [facility].    Attn Isabelle  [address] [facility]    Patient Surgery Date: 6/27/24    Pre-Op instructions : NPO after midnight the night prior to surgery including tobacco/gum/candy/mints/liquids (with exception of sips of water with medications). Pt may brush teeth & swish and spit. For infection prevention, pt is asked to shower the night before and day of procedure with antibacterial soap or antiseptic wash if available. Do not apply anything to skin (ie..lotion, deodorant, powder, cologne). Please remove nail polish. Clean linens should be placed on bed and freshly laundered towels & washcloths should be used for each shower. Pt should not wear jewelry or bring any valuables to the hospital.    Please stop all vitamins & supplements 7 days prior to surgery and stop all NSAIDS ( ASA/Excedrin/BC & Goody Powder, ibuprofen/Motrin/Advil, naproxen/Aleve) 5 days before your surgery. If surgery is earlier than stated above, Stop taking vitamins, supplements, and NSAIDS IMMEDIATELY.    Medications patient is to take prior to arrival with sips of water. carvedilol (Coreg), levothyroxine (Synthroid) and if needed for pain hydrocodone-acetaminophen (Norco). Please continue all medications as scheduled the day & evening prior to surgery.  Anesthesia Protocol: Paulina Varela RN/ Zach COON.     Surgery location: [address] [place] [address].    Time of arrival will be notified by preop nurse. [phone]

## 2024-06-20 NOTE — PERIOP NOTE
Type 1B surgery,  assessment completed with records provided by [facility].      Orders received and posted procedure matches order for consent.    Labs per surgeon: UA on DOS  Labs per anesthesia protocol: none

## 2024-06-26 ENCOUNTER — ANESTHESIA EVENT (OUTPATIENT)
Dept: SURGERY | Age: 79
End: 2024-06-26
Payer: MEDICARE

## 2024-06-27 ENCOUNTER — ANESTHESIA (OUTPATIENT)
Dept: SURGERY | Age: 79
End: 2024-06-27
Payer: MEDICARE

## 2024-06-27 ENCOUNTER — TELEPHONE (OUTPATIENT)
Dept: UROLOGY | Age: 79
End: 2024-06-27

## 2024-06-27 ENCOUNTER — HOSPITAL ENCOUNTER (OUTPATIENT)
Age: 79
Setting detail: OUTPATIENT SURGERY
Discharge: HOME | End: 2024-06-27
Attending: UROLOGY | Admitting: UROLOGY
Payer: MEDICARE

## 2024-06-27 VITALS
BODY MASS INDEX: 16.73 KG/M2 | RESPIRATION RATE: 16 BRPM | SYSTOLIC BLOOD PRESSURE: 178 MMHG | OXYGEN SATURATION: 100 % | HEART RATE: 78 BPM | TEMPERATURE: 97.8 F | DIASTOLIC BLOOD PRESSURE: 87 MMHG | WEIGHT: 110 LBS

## 2024-06-27 DIAGNOSIS — N13.1 HYDRONEPHROSIS WITH URETERAL STRICTURE: ICD-10-CM

## 2024-06-27 DIAGNOSIS — N17.9 AKI (ACUTE KIDNEY INJURY) (HCC): ICD-10-CM

## 2024-06-27 DIAGNOSIS — R78.81 BACTEREMIA DUE TO GRAM-NEGATIVE BACTERIA: Primary | ICD-10-CM

## 2024-06-27 LAB
APPEARANCE UR: CLEAR
BACTERIA URNS QL MICRO: NEGATIVE /HPF
BILIRUB UR QL: NEGATIVE
COLOR UR: ABNORMAL
EPI CELLS #/AREA URNS HPF: ABNORMAL /HPF
GLUCOSE UR STRIP.AUTO-MCNC: NEGATIVE MG/DL
HGB UR QL STRIP: NEGATIVE
HYALINE CASTS URNS QL MICRO: ABNORMAL /LPF
KETONES UR QL STRIP.AUTO: NEGATIVE MG/DL
LEUKOCYTE ESTERASE UR QL STRIP.AUTO: NEGATIVE
NITRITE UR QL STRIP.AUTO: NEGATIVE
PH UR STRIP: 5.5 (ref 5–9)
PROT UR STRIP-MCNC: NEGATIVE MG/DL
RBC #/AREA URNS HPF: ABNORMAL /HPF
SP GR UR REFRACTOMETRY: 1.03 (ref 1–1.02)
UROBILINOGEN UR QL STRIP.AUTO: 1 EU/DL (ref 0.2–1)
WBC URNS QL MICRO: ABNORMAL /HPF

## 2024-06-27 PROCEDURE — 3700000001 HC ADD 15 MINUTES (ANESTHESIA): Performed by: UROLOGY

## 2024-06-27 PROCEDURE — 6370000000 HC RX 637 (ALT 250 FOR IP): Performed by: ANESTHESIOLOGY

## 2024-06-27 PROCEDURE — 2720000010 HC SURG SUPPLY STERILE: Performed by: UROLOGY

## 2024-06-27 PROCEDURE — 3700000000 HC ANESTHESIA ATTENDED CARE: Performed by: UROLOGY

## 2024-06-27 PROCEDURE — C1758 CATHETER, URETERAL: HCPCS | Performed by: UROLOGY

## 2024-06-27 PROCEDURE — 7100000010 HC PHASE II RECOVERY - FIRST 15 MIN: Performed by: UROLOGY

## 2024-06-27 PROCEDURE — 2500000003 HC RX 250 WO HCPCS: Performed by: NURSE ANESTHETIST, CERTIFIED REGISTERED

## 2024-06-27 PROCEDURE — 3600000004 HC SURGERY LEVEL 4 BASE: Performed by: UROLOGY

## 2024-06-27 PROCEDURE — 52005 CYSTO W/URTRL CATHJ: CPT | Performed by: UROLOGY

## 2024-06-27 PROCEDURE — 6360000002 HC RX W HCPCS: Performed by: UROLOGY

## 2024-06-27 PROCEDURE — C1769 GUIDE WIRE: HCPCS | Performed by: UROLOGY

## 2024-06-27 PROCEDURE — 52332 CYSTOSCOPY AND TREATMENT: CPT | Performed by: UROLOGY

## 2024-06-27 PROCEDURE — 6360000004 HC RX CONTRAST MEDICATION: Performed by: UROLOGY

## 2024-06-27 PROCEDURE — 81003 URINALYSIS AUTO W/O SCOPE: CPT

## 2024-06-27 PROCEDURE — 6360000002 HC RX W HCPCS: Performed by: NURSE ANESTHETIST, CERTIFIED REGISTERED

## 2024-06-27 PROCEDURE — 7100000011 HC PHASE II RECOVERY - ADDTL 15 MIN: Performed by: UROLOGY

## 2024-06-27 PROCEDURE — C2617 STENT, NON-COR, TEM W/O DEL: HCPCS | Performed by: UROLOGY

## 2024-06-27 PROCEDURE — 3600000014 HC SURGERY LEVEL 4 ADDTL 15MIN: Performed by: UROLOGY

## 2024-06-27 PROCEDURE — 2709999900 HC NON-CHARGEABLE SUPPLY: Performed by: UROLOGY

## 2024-06-27 PROCEDURE — 74420 UROGRAPHY RTRGR +-KUB: CPT | Performed by: UROLOGY

## 2024-06-27 PROCEDURE — 7100000001 HC PACU RECOVERY - ADDTL 15 MIN: Performed by: UROLOGY

## 2024-06-27 PROCEDURE — 7100000000 HC PACU RECOVERY - FIRST 15 MIN: Performed by: UROLOGY

## 2024-06-27 PROCEDURE — 2580000003 HC RX 258: Performed by: ANESTHESIOLOGY

## 2024-06-27 DEVICE — URETERAL STENT
Type: IMPLANTABLE DEVICE | Site: URETER | Status: FUNCTIONAL
Brand: PERCUFLEX™ PLUS

## 2024-06-27 RX ORDER — PROPOFOL 10 MG/ML
INJECTION, EMULSION INTRAVENOUS PRN
Status: DISCONTINUED | OUTPATIENT
Start: 2024-06-27 | End: 2024-06-27 | Stop reason: SDUPTHER

## 2024-06-27 RX ORDER — SODIUM CHLORIDE 9 MG/ML
INJECTION, SOLUTION INTRAVENOUS PRN
Status: DISCONTINUED | OUTPATIENT
Start: 2024-06-27 | End: 2024-06-27 | Stop reason: HOSPADM

## 2024-06-27 RX ORDER — HYDROCODONE BITARTRATE AND ACETAMINOPHEN 5; 325 MG/1; MG/1
1 TABLET ORAL EVERY 8 HOURS PRN
Qty: 20 TABLET | Refills: 0 | Status: SHIPPED | OUTPATIENT
Start: 2024-06-27 | End: 2024-07-04

## 2024-06-27 RX ORDER — ACETAMINOPHEN 500 MG
1000 TABLET ORAL ONCE
Status: COMPLETED | OUTPATIENT
Start: 2024-06-27 | End: 2024-06-27

## 2024-06-27 RX ORDER — SODIUM CHLORIDE 0.9 % (FLUSH) 0.9 %
5-40 SYRINGE (ML) INJECTION EVERY 12 HOURS SCHEDULED
Status: DISCONTINUED | OUTPATIENT
Start: 2024-06-27 | End: 2024-06-27 | Stop reason: HOSPADM

## 2024-06-27 RX ORDER — SODIUM CHLORIDE 0.9 % (FLUSH) 0.9 %
5-40 SYRINGE (ML) INJECTION PRN
Status: DISCONTINUED | OUTPATIENT
Start: 2024-06-27 | End: 2024-06-27 | Stop reason: HOSPADM

## 2024-06-27 RX ORDER — LIDOCAINE HYDROCHLORIDE 20 MG/ML
INJECTION, SOLUTION EPIDURAL; INFILTRATION; INTRACAUDAL; PERINEURAL PRN
Status: DISCONTINUED | OUTPATIENT
Start: 2024-06-27 | End: 2024-06-27 | Stop reason: SDUPTHER

## 2024-06-27 RX ORDER — DEXAMETHASONE SODIUM PHOSPHATE 4 MG/ML
INJECTION, SOLUTION INTRA-ARTICULAR; INTRALESIONAL; INTRAMUSCULAR; INTRAVENOUS; SOFT TISSUE PRN
Status: DISCONTINUED | OUTPATIENT
Start: 2024-06-27 | End: 2024-06-27 | Stop reason: SDUPTHER

## 2024-06-27 RX ORDER — SULFAMETHOXAZOLE AND TRIMETHOPRIM 800; 160 MG/1; MG/1
1 TABLET ORAL 2 TIMES DAILY
Qty: 6 TABLET | Refills: 0 | Status: SHIPPED | OUTPATIENT
Start: 2024-06-27 | End: 2024-06-30

## 2024-06-27 RX ORDER — MIDAZOLAM HYDROCHLORIDE 2 MG/2ML
2 INJECTION, SOLUTION INTRAMUSCULAR; INTRAVENOUS
Status: DISCONTINUED | OUTPATIENT
Start: 2024-06-27 | End: 2024-06-27 | Stop reason: HOSPADM

## 2024-06-27 RX ORDER — OXYCODONE HYDROCHLORIDE 5 MG/1
5 TABLET ORAL
Status: DISCONTINUED | OUTPATIENT
Start: 2024-06-27 | End: 2024-06-27 | Stop reason: HOSPADM

## 2024-06-27 RX ORDER — SODIUM CHLORIDE, SODIUM LACTATE, POTASSIUM CHLORIDE, CALCIUM CHLORIDE 600; 310; 30; 20 MG/100ML; MG/100ML; MG/100ML; MG/100ML
INJECTION, SOLUTION INTRAVENOUS CONTINUOUS
Status: DISCONTINUED | OUTPATIENT
Start: 2024-06-27 | End: 2024-06-27 | Stop reason: HOSPADM

## 2024-06-27 RX ORDER — ONDANSETRON 2 MG/ML
INJECTION INTRAMUSCULAR; INTRAVENOUS PRN
Status: DISCONTINUED | OUTPATIENT
Start: 2024-06-27 | End: 2024-06-27 | Stop reason: SDUPTHER

## 2024-06-27 RX ORDER — DIPHENHYDRAMINE HYDROCHLORIDE 50 MG/ML
12.5 INJECTION INTRAMUSCULAR; INTRAVENOUS
Status: DISCONTINUED | OUTPATIENT
Start: 2024-06-27 | End: 2024-06-27 | Stop reason: HOSPADM

## 2024-06-27 RX ORDER — ONDANSETRON 2 MG/ML
4 INJECTION INTRAMUSCULAR; INTRAVENOUS
Status: DISCONTINUED | OUTPATIENT
Start: 2024-06-27 | End: 2024-06-27 | Stop reason: HOSPADM

## 2024-06-27 RX ORDER — NALOXONE HYDROCHLORIDE 0.4 MG/ML
INJECTION, SOLUTION INTRAMUSCULAR; INTRAVENOUS; SUBCUTANEOUS PRN
Status: DISCONTINUED | OUTPATIENT
Start: 2024-06-27 | End: 2024-06-27 | Stop reason: HOSPADM

## 2024-06-27 RX ORDER — HYDROMORPHONE HYDROCHLORIDE 2 MG/ML
0.5 INJECTION, SOLUTION INTRAMUSCULAR; INTRAVENOUS; SUBCUTANEOUS EVERY 5 MIN PRN
Status: DISCONTINUED | OUTPATIENT
Start: 2024-06-27 | End: 2024-06-27 | Stop reason: HOSPADM

## 2024-06-27 RX ORDER — FENTANYL CITRATE 50 UG/ML
100 INJECTION, SOLUTION INTRAMUSCULAR; INTRAVENOUS
Status: DISCONTINUED | OUTPATIENT
Start: 2024-06-27 | End: 2024-06-27 | Stop reason: HOSPADM

## 2024-06-27 RX ADMIN — SODIUM CHLORIDE, POTASSIUM CHLORIDE, SODIUM LACTATE AND CALCIUM CHLORIDE: 600; 310; 30; 20 INJECTION, SOLUTION INTRAVENOUS at 06:43

## 2024-06-27 RX ADMIN — PHENYLEPHRINE HYDROCHLORIDE 100 MCG: 0.1 INJECTION, SOLUTION INTRAVENOUS at 07:33

## 2024-06-27 RX ADMIN — PHENYLEPHRINE HYDROCHLORIDE 100 MCG: 0.1 INJECTION, SOLUTION INTRAVENOUS at 07:55

## 2024-06-27 RX ADMIN — DEXAMETHASONE SODIUM PHOSPHATE 4 MG: 4 INJECTION INTRA-ARTICULAR; INTRALESIONAL; INTRAMUSCULAR; INTRAVENOUS; SOFT TISSUE at 07:51

## 2024-06-27 RX ADMIN — LIDOCAINE HYDROCHLORIDE 80 MG: 20 INJECTION, SOLUTION EPIDURAL; INFILTRATION; INTRACAUDAL; PERINEURAL at 07:28

## 2024-06-27 RX ADMIN — ONDANSETRON 4 MG: 2 INJECTION INTRAMUSCULAR; INTRAVENOUS at 07:51

## 2024-06-27 RX ADMIN — Medication 2000 MG: at 07:33

## 2024-06-27 RX ADMIN — ACETAMINOPHEN 1000 MG: 500 TABLET, FILM COATED ORAL at 06:45

## 2024-06-27 RX ADMIN — PHENYLEPHRINE HYDROCHLORIDE 100 MCG: 0.1 INJECTION, SOLUTION INTRAVENOUS at 07:47

## 2024-06-27 RX ADMIN — PHENYLEPHRINE HYDROCHLORIDE 100 MCG: 0.1 INJECTION, SOLUTION INTRAVENOUS at 08:00

## 2024-06-27 RX ADMIN — PROPOFOL 100 MG: 10 INJECTION, EMULSION INTRAVENOUS at 07:28

## 2024-06-27 ASSESSMENT — PAIN SCALES - GENERAL: PAINLEVEL_OUTOF10: 0

## 2024-06-27 ASSESSMENT — PAIN - FUNCTIONAL ASSESSMENT: PAIN_FUNCTIONAL_ASSESSMENT: 0-10

## 2024-06-27 NOTE — ANESTHESIA PRE PROCEDURE
lead trial (unsucessful) and removal   • HERNIA REPAIR      and Colostomy in May   • HERNIA REPAIR  3/2015, 2016   • IR ABSCESS EVAL THRU EXISTING CATH  2021   • IR ABSCESS EVAL THRU EXISTING CATH  2021   • IR ABSCESS EVAL THRU EXISTING CATH  2021   • IR NEPHROSTOGRAM EXISTING ACCESS  12/10/2021   • IR NEPHROSTOGRAM EXISTING ACCESS  10/4/2021   • IR NEPHROSTOMY EXCHANGE CATHETER  2021   • IR NEPHROSTOMY EXCHANGE CATHETER  11/3/2021   • IR NEPHROSTOMY EXCHANGE CATHETER  10/28/2021   • IR NEPHROSTOMY PERCUTANEOUS LEFT  2021   • IR NEPHROSTOMY PERCUTANEOUS LEFT  2021    IR NEPHROSTOMY PERCUTANEOUS LEFT 2021 D RADIOLOGY SPECIALS   • IR REPLCE T CVC WO PORT SAME ACC  2019   • IR TUBE CHANGE  2021   • IR TUBE CHANGE  2021   • IR TUBE CHANGE  2021   • IR TUBE CHANGE  2021   • IR TUNNELED CATHETER PLACEMENT GREATER THAN 5 YEARS  2019   • IR TUNNELED CATHETER PLACEMENT GREATER THAN 5 YEARS  2019    IR TUNNELED CATHETER PLACEMENT GREATER THAN 5 YEARS 2019 D RADIOLOGY SPECIALS   • OTHER SURGICAL HISTORY Bilateral     cataracts     • OTHER SURGICAL HISTORY  10/7/2013    Roberto---endorectal us   • POLYPECTOMY     • TOTAL COLECTOMY  2013    Roberto--lap LAR with coloproctostomy, mobilization splenic flexure, diverting loop ileostomy   • TOTAL COLECTOMY Right 2014    for leak   • VASCULAR SURGERY Left     single lumen port/subsequently removed       Social History:    Social History     Tobacco Use   • Smoking status: Former     Current packs/day: 0.00     Average packs/day: 1 pack/day for 10.0 years (10.0 ttl pk-yrs)     Types: Cigarettes     Start date: 1953     Quit date: 1963     Years since quittin.6     Passive exposure: Current   • Smokeless tobacco: Never   Substance Use Topics   • Alcohol use: Not Currently     Alcohol/week: 11.7 standard drinks of alcohol                                Counseling given: Not

## 2024-06-27 NOTE — H&P
[facility] H&P Note                                           06/27/24     Patient: Peter Nolasco  MRN: [id number]    Admission Date:  6/27/2024, 0  Admission Diagnosis: Hydronephrosis with ureteral stricture [N13.1]  Reason for Consult: Right Ureteral Obstruction and Neurogenic Bladder    ASSESSMENT: 78 y.o. male with right ureteral obstruction managed with R ureteral stent who presents for cystoscopy, right ureteral stent exchange, right retrograde pyelogram, carr catheter exchange.     PLAN:  -To OR who presents for cystoscopy, right ureteral stent exchange, right retrograde pyelogram, carr catheter exchange.  -Consented  -Antibiotic on call to OR  -NPO for procedure.       __________________________________________________________________________________    HPI:     Peter Nolasco is a 78 y.o. male  with a very complex past urologic history.  He currently has a nonfunctional bladder managed with an indwelling Carr catheter as well as a right ureteral obstruction from chronic pelvic adhesions from his prior surgeries and abscess that is managed with a right ureteral stent.  Last stent exchange 12/21/23.  Returns for next routine stent exchange today.     No changes in medical history since last seen by me.     Past Medical History:  Past Medical History:   Diagnosis Date    Acute deep vein thrombosis (DVT) of non-extremity vein 5/20/2019    Anemia     Anxiety     Bipolar disorder (HCC)     Cancer (HCC)     Colostomy status (HCC)     Debility     Difficulty in walking, not elsewhere classified     Disorder of muscle     Displaced fracture of base of neck of right femur, subsequent encounter for closed fracture with routine healing     Dysphagia     Fall (on) (from) other stairs and steps, subsequent encounter     Family history of malignant neoplasm of gastrointestinal tract     mother colon/ovarian cancer 72    Fecal incontinence     LAR syndrome    Carr

## 2024-06-27 NOTE — PERIOP NOTE
Attempted to contact [facility] x[facility] to give nursing report on patient returning to them with no response. Message left with .

## 2024-06-27 NOTE — ANESTHESIA POSTPROCEDURE EVALUATION
Department of Anesthesiology  Postprocedure Note    Patient: Peter Nolasco  MRN: [id number]  YOB: 1945  Date of evaluation: 6/27/2024    Procedure Summary       Date: 06/27/24 Room / Location: [facility] MAIN OR  [facility] / [facility] MAIN OR    Anesthesia Start: 0718 Anesthesia Stop: 0814    Procedure: CYSTOSCOPY, RIGHT URETERAL STENT EXCHANGE, RIGHT RETROGRADE PYELOGRAM, OLSON CATHETER EXCHANGE (Right: Ureter) Diagnosis:       Hydronephrosis with ureteral stricture      (Hydronephrosis with ureteral stricture [N13.1])    Providers: Jaime Alexander MD Responsible Provider: Andrew Christiansen MD    Anesthesia Type: general ASA Status: 3            Anesthesia Type: No value filed.    Romina Phase I: Romina Score: 9    Romina Phase II:      Anesthesia Post Evaluation    Patient location during evaluation: PACU  Patient participation: complete - patient participated  Level of consciousness: awake and alert  Airway patency: patent  Nausea & Vomiting: no nausea and no vomiting  Cardiovascular status: hemodynamically stable  Respiratory status: acceptable, nonlabored ventilation and spontaneous ventilation  Hydration status: euvolemic  Comments: BP (!) 172/96   Pulse 78   Temp 98 °F (36.7 °C) (Temporal)   Resp 16   Wt 49.9 kg (110 lb)   SpO2 100%   BMI 16.73 kg/m²     Multimodal analgesia pain management approach  Pain management: adequate and satisfactory to patient    No notable events documented.

## 2024-06-27 NOTE — BRIEF OP NOTE
Brief Postoperative Note      Patient: Peter Nolasco  YOB: 1945  MRN: [id number]    Date of Procedure: 6/27/2024    Pre-Op Diagnosis Codes:     * Hydronephrosis with ureteral stricture [N13.1]    Post-Op Diagnosis: Same       Procedure(s):  CYSTOSCOPY, RIGHT URETERAL STENT EXCHANGE, RIGHT RETROGRADE PYELOGRAM, JOHN CATHETER EXCHANGE, Intra-operative Interpretation of fluoroscopy < 1 hour.     Surgeon(s):  Jaime Alexander MD    Assistant:  * No surgical staff found *    Anesthesia: General    Estimated Blood Loss (mL): Minimal    Complications: None    Specimens:   * No specimens in log *    Implants:  Implant Name Type Inv. Item Serial No.  Lot No. LRB No. Used Action   STENT URET L26CM DIA8FR HYDR+ TAPR TIP PGTL L SIDEPRT - [id number] Stent:Urological STENT URET L26CM DIA8FR HYDR+ TAPR TIP PGTL L SIDEPRT  [employer]- [id number] Right 1 Implanted         Drains:   Colostomy LLQ (Active)       Urinary Catheter 06/27/24 2 Way (Active)       [REMOVED] NG/OG/NJ/NE Tube Nasogastric 16 fr Left nostril (Removed)       [REMOVED] NG/OG/NJ/NE Tube Nasogastric 18 fr Right nostril (Removed)       [REMOVED] Urinary Catheter 2 Way (Removed)       [REMOVED] Urinary Catheter John (Removed)       [REMOVED] Urinary Catheter John (Removed)       [REMOVED] Urinary Catheter 09/14/22 2 Way (Removed)       [REMOVED] Urinary Catheter 10/16/22 2 Way (Removed)       [REMOVED] Urinary Catheter 10/21/22 2 Way (Removed)       [REMOVED] Urinary Catheter 11/04/22 John (Removed)       [REMOVED] Urinary Catheter 2 Way (Removed)       [REMOVED] Urinary Catheter  (Removed)       [REMOVED] Urinary Catheter 12/06/22 Coude (Removed)       [REMOVED] Urinary Catheter 12/09/22 2 Way (Removed)       [REMOVED] Urinary Catheter 03/22/23 John (Removed)       [REMOVED] Urinary Catheter 06/29/23 2 Way;Coude (Removed)       [REMOVED] Urinary Catheter 07/13/23 2 Way;Coude;John (Removed)       [REMOVED] Urinary

## 2024-06-27 NOTE — TELEPHONE ENCOUNTER
----- Message from Jaime Alexander MD sent at 6/27/2024  8:11 AM EDT -----  Regarding: Surgery Scheduling - 6 months from now (dec or jan 2025)  Hospital: [facility]    Surgeon: Catherine    Assist: NONE    Diagnosis: Right Ureter Stricture    Procedure: Cystoscopy, Right Retrograde Pyelogram, Right Ureteral Stent Exchange, John catheter exchange    Posting time: 30 minutes    Special Instruments Needed:  NONE    Anesthesia: GENERAL    Labs: CBC, BMP, U/A    Tests: EKG per anesthesia    Blood: NONE    Bowel Prep: NONE    Special Instructions: Schedule with me in 6 months (December 2024 or jan 2025)    Orders/HandP:     Follow up appointment: SILVIA

## 2024-07-01 ENCOUNTER — PREP FOR PROCEDURE (OUTPATIENT)
Dept: UROLOGY | Age: 79
End: 2024-07-01

## 2024-07-01 ENCOUNTER — CARE COORDINATION (OUTPATIENT)
Dept: CARE COORDINATION | Facility: CLINIC | Age: 79
End: 2024-07-01

## 2024-07-01 DIAGNOSIS — N13.5 OBSTRUCTION OF RIGHT URETER: Primary | ICD-10-CM

## 2024-07-01 RX ORDER — SODIUM CHLORIDE 9 MG/ML
INJECTION, SOLUTION INTRAVENOUS PRN
OUTPATIENT
Start: 2024-07-01

## 2024-07-01 RX ORDER — SODIUM CHLORIDE 0.9 % (FLUSH) 0.9 %
5-40 SYRINGE (ML) INJECTION PRN
OUTPATIENT
Start: 2024-07-01

## 2024-07-01 RX ORDER — SODIUM CHLORIDE 0.9 % (FLUSH) 0.9 %
5-40 SYRINGE (ML) INJECTION EVERY 12 HOURS SCHEDULED
OUTPATIENT
Start: 2024-07-01

## 2024-07-01 NOTE — TELEPHONE ENCOUNTER
Procedures: Procedure(s):   CYSTOSCOPY, RIGHT RETROGRADE PYELOGRAM, RIGHT URETERAL STENT EXCHANGE, OLSON CATHETER EXCHANGE   Date: 12/19/2024   Time: 1053   Location: [facility] OR  [facility]     Scheduled.  Please complete orders.

## 2024-07-01 NOTE — CARE COORDINATION
Care Transitions Post-Acute Facility Update Call    2024    Patient: Peter Nolasco Patient : 1945   MRN: [id number]  Reason for Admission: Bacteremia Gram negative  Discharge Date: 24 RARS: Readmission Risk Score: 25.9  Spoke with Oscar @ [facility] states patient has returned back to the facility after admitted for a scheduled surgery on 2924(Hydronephrosis with ureteral stricture ). No tentative discharge @ this time. Will continue to monitor for discharge.       Care Transitions Post Acute Facility Update    Care Transitions Interventions      Post Acute Facility Update   Post Acute Facility: [facility]          Nursing       Rehab/Functional       SW/Discharge Planning

## 2024-07-08 ENCOUNTER — CARE COORDINATION (OUTPATIENT)
Dept: CARE COORDINATION | Facility: CLINIC | Age: 79
End: 2024-07-08

## 2024-07-08 NOTE — CARE COORDINATION
Care Transitions Post-Acute Facility Update Call    2024    Patient: Peter Nolasco Patient : 1945   MRN: [id number]  Reason for Admission: Bacteremia Gram negative  Discharge Date: 24 RARS: Readmission Risk Score: 25.9    Spoke with Oscar , patient is currently still @ the facility with no tentative discharge. Will continue to monitor for discharge.     Care Transitions Post Acute Facility Update    Care Transitions Interventions      Post Acute Facility Update   Post Acute Facility: [facility]          Nursing       Rehab/Functional       SW/Discharge Planning

## 2024-07-12 ENCOUNTER — NURSE ONLY (OUTPATIENT)
Dept: UROLOGY | Age: 79
End: 2024-07-12

## 2024-07-12 DIAGNOSIS — N31.9 NEUROGENIC BLADDER: Primary | ICD-10-CM

## 2024-07-12 DIAGNOSIS — Z46.6 URINARY CATHETER CHANGE REQUIRED: ICD-10-CM

## 2024-07-14 ENCOUNTER — APPOINTMENT (OUTPATIENT)
Dept: GENERAL RADIOLOGY | Age: 79
DRG: 371 | End: 2024-07-14
Payer: MEDICARE

## 2024-07-14 ENCOUNTER — HOSPITAL ENCOUNTER (INPATIENT)
Age: 79
LOS: 3 days | Discharge: NURSING, REHAB OR LONG-TERM CARE | DRG: 371 | End: 2024-07-17
Attending: EMERGENCY MEDICINE | Admitting: FAMILY MEDICINE
Payer: MEDICARE

## 2024-07-14 DIAGNOSIS — I95.9 HYPOTENSION, UNSPECIFIED HYPOTENSION TYPE: Primary | ICD-10-CM

## 2024-07-14 DIAGNOSIS — R79.89 TROPONIN I ABOVE REFERENCE RANGE: ICD-10-CM

## 2024-07-14 DIAGNOSIS — R19.7 DIARRHEA, UNSPECIFIED TYPE: ICD-10-CM

## 2024-07-14 DIAGNOSIS — N39.0 URINARY TRACT INFECTION WITHOUT HEMATURIA, SITE UNSPECIFIED: ICD-10-CM

## 2024-07-14 PROBLEM — Z86.711 HISTORY OF PULMONARY EMBOLISM: Status: ACTIVE | Noted: 2024-07-14

## 2024-07-14 PROBLEM — E87.20 METABOLIC ACIDOSIS, NAG, BICARBONATE LOSSES: Status: ACTIVE | Noted: 2024-07-14

## 2024-07-14 PROBLEM — E83.51 HYPOCALCEMIA: Status: ACTIVE | Noted: 2024-07-14

## 2024-07-14 PROBLEM — R82.81 BACTERIURIA WITH PYURIA: Status: ACTIVE | Noted: 2024-07-14

## 2024-07-14 PROBLEM — R82.71 ASYMPTOMATIC BACTERIURIA: Status: ACTIVE | Noted: 2024-07-14

## 2024-07-14 PROBLEM — R94.31 ST SEGMENT DEPRESSION: Status: ACTIVE | Noted: 2024-07-14

## 2024-07-14 PROBLEM — E88.09 HYPOALBUMINEMIA: Status: ACTIVE | Noted: 2024-07-14

## 2024-07-14 PROBLEM — A04.72 C. DIFFICILE DIARRHEA: Status: ACTIVE | Noted: 2024-07-14

## 2024-07-14 PROBLEM — I45.10 RBBB: Status: ACTIVE | Noted: 2024-07-14

## 2024-07-14 PROBLEM — R82.71 BACTERIURIA WITH PYURIA: Status: ACTIVE | Noted: 2024-07-14

## 2024-07-14 PROBLEM — E43 SEVERE PROTEIN-CALORIE MALNUTRITION (GOMEZ: LESS THAN 60% OF STANDARD WEIGHT) (HCC): Status: ACTIVE | Noted: 2024-07-14

## 2024-07-14 PROBLEM — E16.2 HYPOGLYCEMIA: Status: ACTIVE | Noted: 2024-07-14

## 2024-07-14 PROBLEM — Z66 DNR (DO NOT RESUSCITATE): Status: ACTIVE | Noted: 2024-07-14

## 2024-07-14 LAB
ALBUMIN SERPL-MCNC: 2 G/DL (ref 3.2–4.6)
ALBUMIN/GLOB SERPL: 0.8 (ref 1–1.9)
ALP SERPL-CCNC: 82 U/L (ref 40–129)
ALT SERPL-CCNC: 11 U/L (ref 12–65)
ANION GAP BLD CALC-SCNC: 8.3 MMOL/L
ANION GAP SERPL CALC-SCNC: 10 MMOL/L (ref 9–18)
APPEARANCE UR: ABNORMAL
AST SERPL-CCNC: 45 U/L (ref 15–37)
BACTERIA URNS QL MICRO: ABNORMAL /HPF
BASOPHILS # BLD: 0 K/UL (ref 0–0.2)
BASOPHILS NFR BLD: 1 % (ref 0–2)
BILIRUB SERPL-MCNC: <0.2 MG/DL (ref 0–1.2)
BILIRUB UR QL: NEGATIVE
BUN BLD-MCNC: 35 MG/DL (ref 8–26)
BUN SERPL-MCNC: 28 MG/DL (ref 8–23)
CALCIUM SERPL-MCNC: 6.6 MG/DL (ref 8.8–10.2)
CASTS URNS QL MICRO: ABNORMAL /LPF
CHLORIDE BLD-SCNC: 109 MMOL/L (ref 98–107)
CHLORIDE SERPL-SCNC: 118 MMOL/L (ref 98–107)
CO2 BLD-SCNC: 21.7 MMOL/L (ref 21–32)
CO2 SERPL-SCNC: 12 MMOL/L (ref 20–28)
COLOR UR: ABNORMAL
CREAT BLD-MCNC: 1.34 MG/DL (ref 0.8–1.5)
CREAT SERPL-MCNC: 0.82 MG/DL (ref 0.8–1.3)
DIFFERENTIAL METHOD BLD: ABNORMAL
EOSINOPHIL # BLD: 1.4 K/UL (ref 0–0.8)
EOSINOPHIL NFR BLD: 18 % (ref 0.5–7.8)
ERYTHROCYTE [DISTWIDTH] IN BLOOD BY AUTOMATED COUNT: 15 % (ref 11.9–14.6)
GLOBULIN SER CALC-MCNC: 2.6 G/DL (ref 2.3–3.5)
GLUCOSE BLD STRIP.AUTO-MCNC: 94 MG/DL (ref 65–100)
GLUCOSE BLD-MCNC: 102 MG/DL (ref 65–100)
GLUCOSE SERPL-MCNC: 60 MG/DL (ref 70–99)
GLUCOSE UR STRIP.AUTO-MCNC: NEGATIVE MG/DL
HCT VFR BLD AUTO: 37.4 % (ref 41.1–50.3)
HGB BLD-MCNC: 11.3 G/DL (ref 13.6–17.2)
HGB UR QL STRIP: ABNORMAL
IMM GRANULOCYTES # BLD AUTO: 0 K/UL (ref 0–0.5)
IMM GRANULOCYTES NFR BLD AUTO: 0 % (ref 0–5)
KETONES UR QL STRIP.AUTO: NEGATIVE MG/DL
LACTATE SERPL-SCNC: 1.1 MMOL/L (ref 0.5–2)
LACTATE SERPL-SCNC: 1.1 MMOL/L (ref 0.5–2)
LACTATE SERPL-SCNC: 2.5 MMOL/L (ref 0.5–2)
LEUKOCYTE ESTERASE UR QL STRIP.AUTO: ABNORMAL
LIPASE SERPL-CCNC: 27 U/L (ref 13–60)
LYMPHOCYTES # BLD: 0.6 K/UL (ref 0.5–4.6)
LYMPHOCYTES NFR BLD: 8 % (ref 13–44)
MAGNESIUM SERPL-MCNC: 1.8 MG/DL (ref 1.8–2.4)
MCH RBC QN AUTO: 28.5 PG (ref 26.1–32.9)
MCHC RBC AUTO-ENTMCNC: 30.2 G/DL (ref 31.4–35)
MCV RBC AUTO: 94.2 FL (ref 82–102)
MONOCYTES # BLD: 0.7 K/UL (ref 0.1–1.3)
MONOCYTES NFR BLD: 8 % (ref 4–12)
NEUTS SEG # BLD: 5.2 K/UL (ref 1.7–8.2)
NEUTS SEG NFR BLD: 65 % (ref 43–78)
NITRITE UR QL STRIP.AUTO: POSITIVE
NRBC # BLD: 0 K/UL (ref 0–0.2)
OTHER OBSERVATIONS: ABNORMAL
PH UR STRIP: 5.5 (ref 5–9)
PLATELET # BLD AUTO: 310 K/UL (ref 150–450)
PMV BLD AUTO: 9.3 FL (ref 9.4–12.3)
POTASSIUM BLD-SCNC: 3.9 MMOL/L (ref 3.5–5.1)
POTASSIUM SERPL-SCNC: ABNORMAL MMOL/L (ref 3.5–5.1)
PROCALCITONIN SERPL-MCNC: 0.22 NG/ML (ref 0–0.1)
PROT SERPL-MCNC: 4.6 G/DL (ref 6.3–8.2)
PROT UR STRIP-MCNC: 100 MG/DL
RBC # BLD AUTO: 3.97 M/UL (ref 4.23–5.6)
RBC #/AREA URNS HPF: ABNORMAL /HPF
SERVICE CMNT-IMP: NORMAL
SODIUM BLD-SCNC: 139 MMOL/L (ref 136–145)
SODIUM SERPL-SCNC: 139 MMOL/L (ref 136–145)
SP GR UR REFRACTOMETRY: 1.01 (ref 1–1.02)
TROPONIN T SERPL HS-MCNC: 31 NG/L (ref 0–22)
TROPONIN T SERPL HS-MCNC: 33 NG/L (ref 0–22)
TSH W FREE THYROID IF ABNORMAL: 0.47 UIU/ML (ref 0.27–4.2)
UROBILINOGEN UR QL STRIP.AUTO: 0.2 EU/DL (ref 0.2–1)
WBC # BLD AUTO: 8 K/UL (ref 4.3–11.1)
WBC URNS QL MICRO: >100 /HPF

## 2024-07-14 PROCEDURE — 82962 GLUCOSE BLOOD TEST: CPT

## 2024-07-14 PROCEDURE — 6360000002 HC RX W HCPCS: Performed by: FAMILY MEDICINE

## 2024-07-14 PROCEDURE — 85025 COMPLETE CBC W/AUTO DIFF WBC: CPT

## 2024-07-14 PROCEDURE — 87088 URINE BACTERIA CULTURE: CPT

## 2024-07-14 PROCEDURE — 80053 COMPREHEN METABOLIC PANEL: CPT

## 2024-07-14 PROCEDURE — 83735 ASSAY OF MAGNESIUM: CPT

## 2024-07-14 PROCEDURE — 2580000003 HC RX 258: Performed by: EMERGENCY MEDICINE

## 2024-07-14 PROCEDURE — 2580000003 HC RX 258: Performed by: FAMILY MEDICINE

## 2024-07-14 PROCEDURE — 87086 URINE CULTURE/COLONY COUNT: CPT

## 2024-07-14 PROCEDURE — 6370000000 HC RX 637 (ALT 250 FOR IP): Performed by: FAMILY MEDICINE

## 2024-07-14 PROCEDURE — 84443 ASSAY THYROID STIM HORMONE: CPT

## 2024-07-14 PROCEDURE — 84145 PROCALCITONIN (PCT): CPT

## 2024-07-14 PROCEDURE — 87040 BLOOD CULTURE FOR BACTERIA: CPT

## 2024-07-14 PROCEDURE — 84484 ASSAY OF TROPONIN QUANT: CPT

## 2024-07-14 PROCEDURE — 93005 ELECTROCARDIOGRAM TRACING: CPT | Performed by: EMERGENCY MEDICINE

## 2024-07-14 PROCEDURE — 1100000000 HC RM PRIVATE

## 2024-07-14 PROCEDURE — 2500000003 HC RX 250 WO HCPCS: Performed by: FAMILY MEDICINE

## 2024-07-14 PROCEDURE — 86140 C-REACTIVE PROTEIN: CPT

## 2024-07-14 PROCEDURE — 87186 SC STD MICRODIL/AGAR DIL: CPT

## 2024-07-14 PROCEDURE — 6360000002 HC RX W HCPCS: Performed by: EMERGENCY MEDICINE

## 2024-07-14 PROCEDURE — 96374 THER/PROPH/DIAG INJ IV PUSH: CPT

## 2024-07-14 PROCEDURE — 83605 ASSAY OF LACTIC ACID: CPT

## 2024-07-14 PROCEDURE — 71045 X-RAY EXAM CHEST 1 VIEW: CPT

## 2024-07-14 PROCEDURE — 80047 BASIC METABLC PNL IONIZED CA: CPT

## 2024-07-14 PROCEDURE — 99285 EMERGENCY DEPT VISIT HI MDM: CPT

## 2024-07-14 PROCEDURE — 81001 URINALYSIS AUTO W/SCOPE: CPT

## 2024-07-14 PROCEDURE — 96365 THER/PROPH/DIAG IV INF INIT: CPT

## 2024-07-14 PROCEDURE — 83690 ASSAY OF LIPASE: CPT

## 2024-07-14 PROCEDURE — 36415 COLL VENOUS BLD VENIPUNCTURE: CPT

## 2024-07-14 RX ORDER — ACETAMINOPHEN 325 MG/1
650 TABLET ORAL EVERY 6 HOURS PRN
Status: DISCONTINUED | OUTPATIENT
Start: 2024-07-14 | End: 2024-07-17 | Stop reason: HOSPADM

## 2024-07-14 RX ORDER — SODIUM BICARBONATE 650 MG/1
650 TABLET ORAL 4 TIMES DAILY
Status: DISCONTINUED | OUTPATIENT
Start: 2024-07-14 | End: 2024-07-15

## 2024-07-14 RX ORDER — LANOLIN ALCOHOL/MO/W.PET/CERES
3 CREAM (GRAM) TOPICAL EVERY EVENING
Status: DISCONTINUED | OUTPATIENT
Start: 2024-07-14 | End: 2024-07-17 | Stop reason: HOSPADM

## 2024-07-14 RX ORDER — ONDANSETRON 2 MG/ML
4 INJECTION INTRAMUSCULAR; INTRAVENOUS EVERY 6 HOURS PRN
Status: DISCONTINUED | OUTPATIENT
Start: 2024-07-14 | End: 2024-07-17 | Stop reason: HOSPADM

## 2024-07-14 RX ORDER — 0.9 % SODIUM CHLORIDE 0.9 %
30 INTRAVENOUS SOLUTION INTRAVENOUS ONCE
Status: COMPLETED | OUTPATIENT
Start: 2024-07-14 | End: 2024-07-14

## 2024-07-14 RX ORDER — CARVEDILOL 6.25 MG/1
6.25 TABLET ORAL DAILY
Status: DISCONTINUED | OUTPATIENT
Start: 2024-07-14 | End: 2024-07-17 | Stop reason: HOSPADM

## 2024-07-14 RX ORDER — IBUPROFEN 600 MG/1
1 TABLET ORAL PRN
Status: DISCONTINUED | OUTPATIENT
Start: 2024-07-14 | End: 2024-07-17 | Stop reason: HOSPADM

## 2024-07-14 RX ORDER — CALCIUM GLUCONATE 20 MG/ML
1000 INJECTION, SOLUTION INTRAVENOUS ONCE
Status: DISCONTINUED | OUTPATIENT
Start: 2024-07-14 | End: 2024-07-14

## 2024-07-14 RX ORDER — POTASSIUM CHLORIDE 7.45 MG/ML
10 INJECTION INTRAVENOUS PRN
Status: DISCONTINUED | OUTPATIENT
Start: 2024-07-14 | End: 2024-07-17 | Stop reason: HOSPADM

## 2024-07-14 RX ORDER — AMITRIPTYLINE HYDROCHLORIDE 50 MG/1
100 TABLET, FILM COATED ORAL NIGHTLY
Status: DISCONTINUED | OUTPATIENT
Start: 2024-07-14 | End: 2024-07-17 | Stop reason: HOSPADM

## 2024-07-14 RX ORDER — OXYCODONE HYDROCHLORIDE 5 MG/1
5 TABLET ORAL EVERY 4 HOURS PRN
Status: DISCONTINUED | OUTPATIENT
Start: 2024-07-14 | End: 2024-07-17 | Stop reason: HOSPADM

## 2024-07-14 RX ORDER — CLONAZEPAM 1 MG/1
2 TABLET ORAL EVERY 12 HOURS PRN
Status: DISCONTINUED | OUTPATIENT
Start: 2024-07-14 | End: 2024-07-15

## 2024-07-14 RX ORDER — OLANZAPINE 5 MG/1
2.5 TABLET ORAL NIGHTLY
Status: DISCONTINUED | OUTPATIENT
Start: 2024-07-14 | End: 2024-07-17 | Stop reason: HOSPADM

## 2024-07-14 RX ORDER — SODIUM CHLORIDE 0.9 % (FLUSH) 0.9 %
5-40 SYRINGE (ML) INJECTION PRN
Status: DISCONTINUED | OUTPATIENT
Start: 2024-07-14 | End: 2024-07-17 | Stop reason: HOSPADM

## 2024-07-14 RX ORDER — CALCIUM CARBONATE 500 MG/1
500 TABLET, CHEWABLE ORAL 3 TIMES DAILY
Status: DISCONTINUED | OUTPATIENT
Start: 2024-07-15 | End: 2024-07-17 | Stop reason: HOSPADM

## 2024-07-14 RX ORDER — OXYBUTYNIN CHLORIDE 10 MG/1
10 TABLET, EXTENDED RELEASE ORAL DAILY
Status: DISCONTINUED | OUTPATIENT
Start: 2024-07-15 | End: 2024-07-17 | Stop reason: HOSPADM

## 2024-07-14 RX ORDER — QUETIAPINE FUMARATE 25 MG/1
25 TABLET, FILM COATED ORAL
Status: DISCONTINUED | OUTPATIENT
Start: 2024-07-14 | End: 2024-07-17 | Stop reason: HOSPADM

## 2024-07-14 RX ORDER — DEXTROSE MONOHYDRATE 100 MG/ML
INJECTION, SOLUTION INTRAVENOUS CONTINUOUS PRN
Status: DISCONTINUED | OUTPATIENT
Start: 2024-07-14 | End: 2024-07-17 | Stop reason: HOSPADM

## 2024-07-14 RX ORDER — ONDANSETRON 4 MG/1
4 TABLET, ORALLY DISINTEGRATING ORAL EVERY 8 HOURS PRN
Status: DISCONTINUED | OUTPATIENT
Start: 2024-07-14 | End: 2024-07-17 | Stop reason: HOSPADM

## 2024-07-14 RX ORDER — HYDROMORPHONE HYDROCHLORIDE 1 MG/ML
0.25 INJECTION, SOLUTION INTRAMUSCULAR; INTRAVENOUS; SUBCUTANEOUS EVERY 4 HOURS PRN
Status: DISCONTINUED | OUTPATIENT
Start: 2024-07-14 | End: 2024-07-17 | Stop reason: HOSPADM

## 2024-07-14 RX ORDER — TROSPIUM CHLORIDE 20 MG/1
20 TABLET, FILM COATED ORAL
Status: DISCONTINUED | OUTPATIENT
Start: 2024-07-15 | End: 2024-07-14 | Stop reason: SDUPTHER

## 2024-07-14 RX ORDER — MAGNESIUM SULFATE IN WATER 40 MG/ML
2000 INJECTION, SOLUTION INTRAVENOUS PRN
Status: DISCONTINUED | OUTPATIENT
Start: 2024-07-14 | End: 2024-07-17 | Stop reason: HOSPADM

## 2024-07-14 RX ORDER — SODIUM CHLORIDE 0.9 % (FLUSH) 0.9 %
5-40 SYRINGE (ML) INJECTION EVERY 12 HOURS SCHEDULED
Status: DISCONTINUED | OUTPATIENT
Start: 2024-07-14 | End: 2024-07-17 | Stop reason: HOSPADM

## 2024-07-14 RX ORDER — ACETAMINOPHEN 650 MG/1
650 SUPPOSITORY RECTAL EVERY 6 HOURS PRN
Status: DISCONTINUED | OUTPATIENT
Start: 2024-07-14 | End: 2024-07-17 | Stop reason: HOSPADM

## 2024-07-14 RX ORDER — ENOXAPARIN SODIUM 100 MG/ML
30 INJECTION SUBCUTANEOUS EVERY EVENING
Status: DISCONTINUED | OUTPATIENT
Start: 2024-07-14 | End: 2024-07-15

## 2024-07-14 RX ORDER — VANCOMYCIN HYDROCHLORIDE 125 MG/1
500 CAPSULE ORAL 4 TIMES DAILY
Status: DISCONTINUED | OUTPATIENT
Start: 2024-07-14 | End: 2024-07-17 | Stop reason: HOSPADM

## 2024-07-14 RX ORDER — SODIUM CHLORIDE 9 MG/ML
INJECTION, SOLUTION INTRAVENOUS PRN
Status: DISCONTINUED | OUTPATIENT
Start: 2024-07-14 | End: 2024-07-17 | Stop reason: HOSPADM

## 2024-07-14 RX ORDER — METRONIDAZOLE 500 MG/100ML
500 INJECTION, SOLUTION INTRAVENOUS EVERY 8 HOURS
Status: DISCONTINUED | OUTPATIENT
Start: 2024-07-14 | End: 2024-07-17 | Stop reason: HOSPADM

## 2024-07-14 RX ORDER — POTASSIUM CHLORIDE 20 MEQ/1
40 TABLET, EXTENDED RELEASE ORAL PRN
Status: DISCONTINUED | OUTPATIENT
Start: 2024-07-14 | End: 2024-07-17 | Stop reason: HOSPADM

## 2024-07-14 RX ORDER — POLYETHYLENE GLYCOL 3350 17 G/17G
17 POWDER, FOR SOLUTION ORAL DAILY PRN
Status: DISCONTINUED | OUTPATIENT
Start: 2024-07-14 | End: 2024-07-17 | Stop reason: HOSPADM

## 2024-07-14 RX ORDER — MAGNESIUM HYDROXIDE/ALUMINUM HYDROXICE/SIMETHICONE 120; 1200; 1200 MG/30ML; MG/30ML; MG/30ML
30 SUSPENSION ORAL EVERY 6 HOURS PRN
Status: DISCONTINUED | OUTPATIENT
Start: 2024-07-14 | End: 2024-07-17 | Stop reason: HOSPADM

## 2024-07-14 RX ORDER — DEXTROSE MONOHYDRATE, SODIUM CHLORIDE, AND POTASSIUM CHLORIDE 50; 1.49; 4.5 G/1000ML; G/1000ML; G/1000ML
INJECTION, SOLUTION INTRAVENOUS CONTINUOUS
Status: DISPENSED | OUTPATIENT
Start: 2024-07-14 | End: 2024-07-15

## 2024-07-14 RX ADMIN — AMITRIPTYLINE HYDROCHLORIDE 100 MG: 50 TABLET, FILM COATED ORAL at 21:43

## 2024-07-14 RX ADMIN — METRONIDAZOLE 500 MG: 500 INJECTION, SOLUTION INTRAVENOUS at 23:11

## 2024-07-14 RX ADMIN — SODIUM BICARBONATE 650 MG TABLET 650 MG: at 15:43

## 2024-07-14 RX ADMIN — CLONAZEPAM 2 MG: 1 TABLET ORAL at 21:43

## 2024-07-14 RX ADMIN — POTASSIUM CHLORIDE, DEXTROSE MONOHYDRATE AND SODIUM CHLORIDE: 150; 5; 450 INJECTION, SOLUTION INTRAVENOUS at 15:39

## 2024-07-14 RX ADMIN — SODIUM CHLORIDE, PRESERVATIVE FREE 10 ML: 5 INJECTION INTRAVENOUS at 21:44

## 2024-07-14 RX ADMIN — VANCOMYCIN HYDROCHLORIDE 500 MG: 125 CAPSULE ORAL at 21:43

## 2024-07-14 RX ADMIN — SODIUM BICARBONATE 650 MG TABLET 650 MG: at 21:43

## 2024-07-14 RX ADMIN — PIPERACILLIN AND TAZOBACTAM 4500 MG: 4; .5 INJECTION, POWDER, LYOPHILIZED, FOR SOLUTION INTRAVENOUS at 12:27

## 2024-07-14 RX ADMIN — QUETIAPINE FUMARATE 25 MG: 25 TABLET ORAL at 21:43

## 2024-07-14 RX ADMIN — SODIUM CHLORIDE: 9 INJECTION, SOLUTION INTRAVENOUS at 23:10

## 2024-07-14 RX ADMIN — METRONIDAZOLE 500 MG: 500 INJECTION, SOLUTION INTRAVENOUS at 15:41

## 2024-07-14 RX ADMIN — VANCOMYCIN HYDROCHLORIDE 500 MG: 125 CAPSULE ORAL at 17:50

## 2024-07-14 RX ADMIN — ENOXAPARIN SODIUM 30 MG: 100 INJECTION SUBCUTANEOUS at 17:50

## 2024-07-14 RX ADMIN — SODIUM CHLORIDE 1443 ML: 9 INJECTION, SOLUTION INTRAVENOUS at 12:27

## 2024-07-14 ASSESSMENT — PAIN SCALES - GENERAL: PAINLEVEL_OUTOF10: 0

## 2024-07-14 NOTE — ED TRIAGE NOTES
Pt arrives via [facility] coming from [facility] post [facility]. Unit was called out for hypotension but has since resolved. Pt has been having diarrhea.

## 2024-07-14 NOTE — ED NOTES
TRANSFER - OUT REPORT:    Verbal report given to 6th floor RN on Peter Nolasco  being transferred to 9 for routine progression of patient care       Report consisted of patient's Situation, Background, Assessment and   Recommendations(SBAR).     Information from the following report(s) Nurse Handoff Report was reviewed with the receiving nurse.    Gaston Fall Assessment:    Presents to emergency department  because of falls (Syncope, seizure, or loss of consciousness): No  Age > 70: Yes  Altered Mental Status, Intoxication with alcohol or substance confusion (Disorientation, impaired judgment, poor safety awaremess, or inability to follow instructions): No  Impaired Mobility: Ambulates or transfers with assistive devices or assistance; Unable to ambulate or transer.: Yes             Lines:   Peripheral IV Proximal;Right;Anterior Forearm (Active)       Peripheral IV Left Antecubital (Active)        Opportunity for questions and clarification was provided.      Patient transported with:  Renetta Baires RN  07/14/24 6121

## 2024-07-14 NOTE — H&P
Hospitalist History and Physical   Admit Date:  2024 11:42 AM   Name:  Peter Nolasco   Age:  78 y.o.  Sex:  male  :  1945   MRN:  [id number]   Room:  [facility]    Presenting/Chief Complaint: Diarrhea     Reason(s) for Admission: C. difficile diarrhea [A04.72]     History of Present Illness:   Peter Nolasco is a 78 y.o. male with medical history of hypothyroidism, anemia, chronic indwelling John, ureteral stenosis s/p stenting with recent R. Stent exchange, C. difficile, hypothyroidism, decubitus ulcers, chronic slurred speech, debility right bundle hilario block who presented with hypotension.    Peter presents from [facility] post[facility] after having episodes of hypotension and diarrhea.  At their facility he was diagnosed with C. difficile and placed on oral vancomycin.  However, since initiation of antibiotics he has continued to have large-volume nonbloody and nonblack watery stools.  Despite the symptoms he has not had any abdominal pain.  He has not also not had any nausea or vomiting except with an isolated episode of emesis earlier in the week.  His blood pressure was found to have a systolic of 70s in which prompted call to EMS and transport to the emergency department for further treatment and management.  He was just admitted to the hospital on May 22 through  where he was found to have E. coli bacteremia and underwent a right ureteral stent exchange for which he received antibiotics.  He has a history of rectal cancer with ileostomy with sequelae of urostomy and ureteral stenosis after receiving radiation.    In the ER, TM 98.1 °F, RR 16, HR 83, BP 87/68, SpO2 97% on room air.  Labs remarkable for , CO2 of 12, AG of 10, Glu 60, Cr 0.82 (baseline 1.0), calcium of 6.6 with albumin of 2.0, and AST of 45.  CBC without leukocytosis.  Hemoglobin 11.3 increased from prior baseline between 8.0 and 9.5.  LA 1.1.  Lipase negative.  Procalcitonin 0.22.  Troponins 33.0 and 31.0

## 2024-07-14 NOTE — ACP (ADVANCE CARE PLANNING)
[facility] Hospitalist Service  NON BILLABLE  At the heart of better care     Advance Care Planning   Admit Date:  2024 11:42 AM   Name:  Peter Nolasco   Age:  78 y.o.  Sex:  male  :  1945   MRN:  [id number]   Room:  [place] [address]    Peter Nolasco has capacity to make his own decisions:   Yes    If pt unable to make decisions, POA/surrogate decision maker:  Manju    Other people present:   None    Patient / surrogate decision-maker directed code status:  DNR/DNI    Other ACP topics discussed, if applicable:   None    Patient or surrogate consented to discussion of the current conditions, workup, management plans, prognosis, and the risk for further deterioration.  Time spent: 5 minutes in direct discussion.      Signed:  Evangelist Chiang DO

## 2024-07-14 NOTE — ED PROVIDER NOTES
136 - 145 mmol/L    POC Potassium 3.9 3.5 - 5.1 mmol/L    POC Chloride 109 (H) 98 - 107 mmol/L    POC TCO2 21.7 21 - 32 mmol/L    Anion Gap, POC 8.3 mmol/L    POC Glucose 102 (H) 65 - 100 mg/dL    POC BUN 35 (H) 8 - 26 mg/dL    POC Creatinine 1.34 0.8 - 1.5 mg/dL    eGFR, POC 54 (L) >60 ml/min/1.73m2   POCT Glucose   Result Value Ref Range    POC Glucose 94 65 - 100 mg/dL    Performed by: Sanjiv          XR CHEST PORTABLE   Final Result   No focal consolidation or pleural effusion. No displaced rib   fractures. No pneumothorax.         Electronically signed by Emery Lora                   No results for input(s): \"COVID19\" in the last 72 hours.    Voice dictation software was used during the making of this note.  This software is not perfect and grammatical and other typographical errors may be present.  This note has not been completely proofread for errors.      Alise Angeles MD  07/14/24 5487

## 2024-07-14 NOTE — PLAN OF CARE
Problem: Discharge Planning  Goal: Discharge to home or other facility with appropriate resources  7/14/2024 1921 by Anastasiia Lynne RN  Outcome: Progressing  7/14/2024 1806 by Elba Deutsch RN  Outcome: Progressing  Flowsheets  Taken 7/14/2024 1717  Discharge to home or other facility with appropriate resources: Identify barriers to discharge with patient and caregiver  Taken 7/14/2024 1715  Discharge to home or other facility with appropriate resources: Identify barriers to discharge with patient and caregiver     Problem: Skin/Tissue Integrity  Goal: Absence of new skin breakdown  Description: 1.  Monitor for areas of redness and/or skin breakdown  2.  Assess vascular access sites hourly  3.  Every 4-6 hours minimum:  Change oxygen saturation probe site  4.  Every 4-6 hours:  If on nasal continuous positive airway pressure, respiratory therapy assess nares and determine need for appliance change or resting period.  7/14/2024 1921 by Anastasiia Lynne RN  Outcome: Progressing  7/14/2024 1806 by Elba Deutsch, RN  Outcome: Progressing     Problem: ABCDS Injury Assessment  Goal: Absence of physical injury  7/14/2024 1921 by Anastasiia Lynne RN  Outcome: Progressing  7/14/2024 1806 by Elba Deutsch, RN  Outcome: Progressing     Problem: Safety - Adult  Goal: Free from fall injury  7/14/2024 1921 by Anastasiia Lynne RN  Outcome: Progressing  7/14/2024 1806 by Elba Deutsch, RN  Outcome: Progressing

## 2024-07-14 NOTE — ED NOTES
BGL 60, Per MD okay to give patient food. Swallow screening completed      Renetta Stephen, RN  07/14/24 4923

## 2024-07-15 ENCOUNTER — APPOINTMENT (OUTPATIENT)
Dept: ULTRASOUND IMAGING | Age: 79
DRG: 371 | End: 2024-07-15
Attending: FAMILY MEDICINE
Payer: MEDICARE

## 2024-07-15 ENCOUNTER — APPOINTMENT (OUTPATIENT)
Dept: NON INVASIVE DIAGNOSTICS | Age: 79
DRG: 371 | End: 2024-07-15
Attending: FAMILY MEDICINE
Payer: MEDICARE

## 2024-07-15 LAB
ALBUMIN SERPL-MCNC: 2.7 G/DL (ref 3.2–4.6)
ALBUMIN/GLOB SERPL: 0.8 (ref 1–1.9)
ALP SERPL-CCNC: 106 U/L (ref 40–129)
ALT SERPL-CCNC: 10 U/L (ref 12–65)
ANION GAP SERPL CALC-SCNC: 9 MMOL/L (ref 9–18)
AST SERPL-CCNC: 18 U/L (ref 15–37)
BASOPHILS # BLD: 0 K/UL (ref 0–0.2)
BASOPHILS NFR BLD: 0 % (ref 0–2)
BILIRUB SERPL-MCNC: 0.2 MG/DL (ref 0–1.2)
BUN SERPL-MCNC: 28 MG/DL (ref 8–23)
C. DIFFICILE TOXIN MOLECULAR: NEGATIVE
CALCIUM SERPL-MCNC: 8.8 MG/DL (ref 8.8–10.2)
CHLORIDE SERPL-SCNC: 107 MMOL/L (ref 98–107)
CO2 SERPL-SCNC: 20 MMOL/L (ref 20–28)
CREAT SERPL-MCNC: 1.22 MG/DL (ref 0.8–1.3)
DIFFERENTIAL METHOD BLD: ABNORMAL
ECHO AO ROOT DIAM: 3.5 CM
ECHO AO ROOT INDEX: 2.17 CM/M2
ECHO AV AREA PEAK VELOCITY: 3.1 CM2
ECHO AV AREA VTI: 2.3 CM2
ECHO AV AREA/BSA PEAK VELOCITY: 1.9 CM2/M2
ECHO AV AREA/BSA VTI: 1.4 CM2/M2
ECHO AV MEAN GRADIENT: 3 MMHG
ECHO AV MEAN VELOCITY: 0.8 M/S
ECHO AV PEAK GRADIENT: 5 MMHG
ECHO AV PEAK VELOCITY: 1.1 M/S
ECHO AV VELOCITY RATIO: 0.73
ECHO AV VTI: 18.6 CM
ECHO BSA: 1.51 M2
ECHO EST RA PRESSURE: 3 MMHG
ECHO IVC PROX: 1.4 CM
ECHO LV E' LATERAL VELOCITY: 12 CM/S
ECHO LV E' SEPTAL VELOCITY: 5 CM/S
ECHO LV EDV A4C: 97 ML
ECHO LV EDV INDEX A4C: 60 ML/M2
ECHO LV EJECTION FRACTION A4C: 55 %
ECHO LV ESV A4C: 44 ML
ECHO LV ESV INDEX A4C: 27 ML/M2
ECHO LV FRACTIONAL SHORTENING: 26 % (ref 28–44)
ECHO LV INTERNAL DIMENSION DIASTOLE INDEX: 2.67 CM/M2
ECHO LV INTERNAL DIMENSION DIASTOLIC: 4.3 CM (ref 4.2–5.9)
ECHO LV INTERNAL DIMENSION SYSTOLIC INDEX: 1.99 CM/M2
ECHO LV INTERNAL DIMENSION SYSTOLIC: 3.2 CM
ECHO LV IVSD: 1.1 CM (ref 0.6–1)
ECHO LV MASS 2D: 152.6 G (ref 88–224)
ECHO LV MASS INDEX 2D: 94.8 G/M2 (ref 49–115)
ECHO LV POSTERIOR WALL DIASTOLIC: 1 CM (ref 0.6–1)
ECHO LV RELATIVE WALL THICKNESS RATIO: 0.47
ECHO LVOT AREA: 4.2 CM2
ECHO LVOT AV VTI INDEX: 0.55
ECHO LVOT DIAM: 2.3 CM
ECHO LVOT MEAN GRADIENT: 1 MMHG
ECHO LVOT PEAK GRADIENT: 3 MMHG
ECHO LVOT PEAK VELOCITY: 0.8 M/S
ECHO LVOT STROKE VOLUME INDEX: 26.3 ML/M2
ECHO LVOT SV: 42.4 ML
ECHO LVOT VTI: 10.2 CM
ECHO MV A VELOCITY: 0.76 M/S
ECHO MV E DECELERATION TIME (DT): 164 MS
ECHO MV E VELOCITY: 0.51 M/S
ECHO MV E/A RATIO: 0.67
ECHO MV E/E' LATERAL: 4.25
ECHO MV E/E' RATIO (AVERAGED): 7.23
ECHO MV E/E' SEPTAL: 10.2
ECHO PV ACCELERATION TIME (AT): 92 MS
ECHO PV MAX VELOCITY: 1 M/S
ECHO PV PEAK GRADIENT: 4 MMHG
ECHO RIGHT VENTRICULAR SYSTOLIC PRESSURE (RVSP): 21 MMHG
ECHO RV FREE WALL PEAK S': 11 CM/S
ECHO TV REGURGITANT MAX VELOCITY: 2.13 M/S
ECHO TV REGURGITANT PEAK GRADIENT: 18 MMHG
EKG ATRIAL RATE: 88 BPM
EKG DIAGNOSIS: NORMAL
EKG P AXIS: -42 DEGREES
EKG P-R INTERVAL: 195 MS
EKG Q-T INTERVAL: 378 MS
EKG QRS DURATION: 150 MS
EKG QTC CALCULATION (BAZETT): 460 MS
EKG R AXIS: 142 DEGREES
EKG T AXIS: 92 DEGREES
EKG VENTRICULAR RATE: 89 BPM
EOSINOPHIL # BLD: 0.9 K/UL (ref 0–0.8)
EOSINOPHIL NFR BLD: 11 % (ref 0.5–7.8)
ERYTHROCYTE [DISTWIDTH] IN BLOOD BY AUTOMATED COUNT: 15 % (ref 11.9–14.6)
FERRITIN SERPL-MCNC: 63 NG/ML (ref 8–388)
FOLATE SERPL-MCNC: 20 NG/ML (ref 3.1–17.5)
GLOBULIN SER CALC-MCNC: 3.2 G/DL (ref 2.3–3.5)
GLUCOSE SERPL-MCNC: 109 MG/DL (ref 70–99)
HCT VFR BLD AUTO: 35.9 % (ref 41.1–50.3)
HGB BLD-MCNC: 11.1 G/DL (ref 13.6–17.2)
IMM GRANULOCYTES # BLD AUTO: 0 K/UL (ref 0–0.5)
IMM GRANULOCYTES NFR BLD AUTO: 0 % (ref 0–5)
IRON SATN MFR SERPL: 16 % (ref 20–50)
IRON SERPL-MCNC: 49 UG/DL (ref 35–100)
LYMPHOCYTES # BLD: 0.4 K/UL (ref 0.5–4.6)
LYMPHOCYTES NFR BLD: 5 % (ref 13–44)
MAGNESIUM SERPL-MCNC: 1.7 MG/DL (ref 1.8–2.4)
MCH RBC QN AUTO: 28.8 PG (ref 26.1–32.9)
MCHC RBC AUTO-ENTMCNC: 30.9 G/DL (ref 31.4–35)
MCV RBC AUTO: 93.2 FL (ref 82–102)
MONOCYTES # BLD: 0.6 K/UL (ref 0.1–1.3)
MONOCYTES NFR BLD: 8 % (ref 4–12)
NEUTS SEG # BLD: 6.3 K/UL (ref 1.7–8.2)
NEUTS SEG NFR BLD: 76 % (ref 43–78)
NRBC # BLD: 0 K/UL (ref 0–0.2)
PLATELET # BLD AUTO: 202 K/UL (ref 150–450)
PMV BLD AUTO: 9 FL (ref 9.4–12.3)
POTASSIUM SERPL-SCNC: 4.2 MMOL/L (ref 3.5–5.1)
PROT SERPL-MCNC: 5.8 G/DL (ref 6.3–8.2)
RBC # BLD AUTO: 3.85 M/UL (ref 4.23–5.6)
SODIUM SERPL-SCNC: 136 MMOL/L (ref 136–145)
TIBC SERPL-MCNC: 297 UG/DL (ref 240–450)
TRANSFERRIN SERPL-MCNC: 239 MG/DL (ref 200–360)
UIBC SERPL-MCNC: 248 UG/DL (ref 112–347)
VIT B12 SERPL-MCNC: 868 PG/ML (ref 193–986)
WBC # BLD AUTO: 8.3 K/UL (ref 4.3–11.1)

## 2024-07-15 PROCEDURE — 6370000000 HC RX 637 (ALT 250 FOR IP): Performed by: FAMILY MEDICINE

## 2024-07-15 PROCEDURE — 82728 ASSAY OF FERRITIN: CPT

## 2024-07-15 PROCEDURE — 97112 NEUROMUSCULAR REEDUCATION: CPT

## 2024-07-15 PROCEDURE — 97535 SELF CARE MNGMENT TRAINING: CPT

## 2024-07-15 PROCEDURE — 97530 THERAPEUTIC ACTIVITIES: CPT

## 2024-07-15 PROCEDURE — 2500000003 HC RX 250 WO HCPCS: Performed by: FAMILY MEDICINE

## 2024-07-15 PROCEDURE — 97116 GAIT TRAINING THERAPY: CPT

## 2024-07-15 PROCEDURE — 82607 VITAMIN B-12: CPT

## 2024-07-15 PROCEDURE — 6370000000 HC RX 637 (ALT 250 FOR IP): Performed by: NURSE PRACTITIONER

## 2024-07-15 PROCEDURE — 92610 EVALUATE SWALLOWING FUNCTION: CPT

## 2024-07-15 PROCEDURE — 6360000002 HC RX W HCPCS: Performed by: FAMILY MEDICINE

## 2024-07-15 PROCEDURE — 36415 COLL VENOUS BLD VENIPUNCTURE: CPT

## 2024-07-15 PROCEDURE — 97165 OT EVAL LOW COMPLEX 30 MIN: CPT

## 2024-07-15 PROCEDURE — 82746 ASSAY OF FOLIC ACID SERUM: CPT

## 2024-07-15 PROCEDURE — 6360000002 HC RX W HCPCS: Performed by: STUDENT IN AN ORGANIZED HEALTH CARE EDUCATION/TRAINING PROGRAM

## 2024-07-15 PROCEDURE — 93306 TTE W/DOPPLER COMPLETE: CPT | Performed by: INTERNAL MEDICINE

## 2024-07-15 PROCEDURE — 83540 ASSAY OF IRON: CPT

## 2024-07-15 PROCEDURE — 93306 TTE W/DOPPLER COMPLETE: CPT

## 2024-07-15 PROCEDURE — 1100000000 HC RM PRIVATE

## 2024-07-15 PROCEDURE — 2580000003 HC RX 258: Performed by: FAMILY MEDICINE

## 2024-07-15 PROCEDURE — 97161 PT EVAL LOW COMPLEX 20 MIN: CPT

## 2024-07-15 PROCEDURE — 76775 US EXAM ABDO BACK WALL LIM: CPT

## 2024-07-15 PROCEDURE — 80053 COMPREHEN METABOLIC PANEL: CPT

## 2024-07-15 PROCEDURE — 87493 C DIFF AMPLIFIED PROBE: CPT

## 2024-07-15 PROCEDURE — 84466 ASSAY OF TRANSFERRIN: CPT

## 2024-07-15 PROCEDURE — 93010 ELECTROCARDIOGRAM REPORT: CPT | Performed by: INTERNAL MEDICINE

## 2024-07-15 PROCEDURE — 83735 ASSAY OF MAGNESIUM: CPT

## 2024-07-15 PROCEDURE — 85025 COMPLETE CBC W/AUTO DIFF WBC: CPT

## 2024-07-15 RX ORDER — ACETAMINOPHEN 650 MG/1
650 SUPPOSITORY RECTAL EVERY 8 HOURS PRN
COMMUNITY

## 2024-07-15 RX ORDER — CLONAZEPAM 2 MG/1
4 TABLET ORAL NIGHTLY PRN
COMMUNITY

## 2024-07-15 RX ORDER — CLONAZEPAM 1 MG/1
4 TABLET ORAL NIGHTLY PRN
Status: DISCONTINUED | OUTPATIENT
Start: 2024-07-15 | End: 2024-07-17 | Stop reason: HOSPADM

## 2024-07-15 RX ORDER — ENOXAPARIN SODIUM 100 MG/ML
40 INJECTION SUBCUTANEOUS EVERY EVENING
Status: DISCONTINUED | OUTPATIENT
Start: 2024-07-15 | End: 2024-07-17

## 2024-07-15 RX ADMIN — ENOXAPARIN SODIUM 40 MG: 100 INJECTION SUBCUTANEOUS at 17:14

## 2024-07-15 RX ADMIN — SODIUM BICARBONATE 650 MG TABLET 650 MG: at 10:22

## 2024-07-15 RX ADMIN — AMITRIPTYLINE HYDROCHLORIDE 100 MG: 50 TABLET, FILM COATED ORAL at 22:12

## 2024-07-15 RX ADMIN — VANCOMYCIN HYDROCHLORIDE 500 MG: 125 CAPSULE ORAL at 17:14

## 2024-07-15 RX ADMIN — OXYBUTYNIN CHLORIDE 10 MG: 10 TABLET, EXTENDED RELEASE ORAL at 10:22

## 2024-07-15 RX ADMIN — SODIUM CHLORIDE, PRESERVATIVE FREE 10 ML: 5 INJECTION INTRAVENOUS at 22:12

## 2024-07-15 RX ADMIN — SODIUM CHLORIDE: 9 INJECTION, SOLUTION INTRAVENOUS at 23:17

## 2024-07-15 RX ADMIN — VANCOMYCIN HYDROCHLORIDE 500 MG: 125 CAPSULE ORAL at 13:14

## 2024-07-15 RX ADMIN — CALCIUM CARBONATE (ANTACID) CHEW TAB 500 MG 500 MG: 500 CHEW TAB at 10:22

## 2024-07-15 RX ADMIN — POTASSIUM CHLORIDE, DEXTROSE MONOHYDRATE AND SODIUM CHLORIDE: 150; 5; 450 INJECTION, SOLUTION INTRAVENOUS at 02:57

## 2024-07-15 RX ADMIN — VANCOMYCIN HYDROCHLORIDE 500 MG: 125 CAPSULE ORAL at 22:11

## 2024-07-15 RX ADMIN — CLONAZEPAM 4 MG: 1 TABLET ORAL at 22:11

## 2024-07-15 RX ADMIN — LEVOTHYROXINE SODIUM 125 MCG: 0.07 TABLET ORAL at 05:45

## 2024-07-15 RX ADMIN — VANCOMYCIN HYDROCHLORIDE 500 MG: 125 CAPSULE ORAL at 10:22

## 2024-07-15 RX ADMIN — QUETIAPINE FUMARATE 25 MG: 25 TABLET ORAL at 22:11

## 2024-07-15 RX ADMIN — CALCIUM CARBONATE (ANTACID) CHEW TAB 500 MG 500 MG: 500 CHEW TAB at 22:12

## 2024-07-15 RX ADMIN — METRONIDAZOLE 500 MG: 500 INJECTION, SOLUTION INTRAVENOUS at 23:17

## 2024-07-15 RX ADMIN — METRONIDAZOLE 500 MG: 500 INJECTION, SOLUTION INTRAVENOUS at 05:47

## 2024-07-15 RX ADMIN — SODIUM CHLORIDE, PRESERVATIVE FREE 10 ML: 5 INJECTION INTRAVENOUS at 10:26

## 2024-07-15 RX ADMIN — METRONIDAZOLE 500 MG: 500 INJECTION, SOLUTION INTRAVENOUS at 14:52

## 2024-07-15 ASSESSMENT — PAIN SCALES - GENERAL: PAINLEVEL_OUTOF10: 0

## 2024-07-15 NOTE — THERAPY EVALUATION
Max=Maximal Assistance, Total=Total Assistance, NT=Not Tested    PLAN:   FREQUENCY AND DURATION: 3 times/week for duration of hospital stay or until stated goals are met, whichever comes first.    THERAPY PROGNOSIS: Good    PROBLEM LIST:   (Skilled intervention is medically necessary to address:)  Decreased ADL/Functional Activities  Decreased Activity Tolerance  Decreased Balance  Decreased Strength  Decreased Transfer Abilities INTERVENTIONS PLANNED:   (Benefits and precautions of physical therapy have been discussed with the patient.)  Self Care Training  Therapeutic Activity  Therapeutic Exercise/HEP  Neuromuscular Re-education  Gait Training  Education       TREATMENT:   EVALUATION: LOW COMPLEXITY: (Untimed Charge)  The initial evaluation charge encompasses clinical chart review, objective assessment, interpretation of assessment, and skilled monitoring of the patient's response to treatment in order to develop a plan of care.     TREATMENT:   Co-Treatment PT/OT necessary due to patient's decreased overall endurance/tolerance levels, as well as need for high level skilled assistance to complete functional transfers/mobility and functional tasks  Therapeutic Activity (20 Minutes): Therapeutic activity included Rolling, Supine to Sit, Scooting, Transfer Training, Sitting balance , and Standing balance to improve functional Activity tolerance, Balance, Coordination, Mobility, and Strength.  Gait Training (8 Minutes): Gait training for 500 feet utilizing Gait Belt and Rolling Walker. Patient required Verbal cueing to improve Activity Pacing, Dynamic Standing Balance, and Gait Mechanics.     TREATMENT GRID:  N/A    AFTER TREATMENT PRECAUTIONS: Alarm Activated, Bed/Chair Locked, Call light within reach, Chair, Needs within reach, and RN notified    INTERDISCIPLINARY COLLABORATION:  RN/ PCT, PT/ PTA, and OT/ FARNSWORTH    EDUCATION: Education Given To: Patient  Education Provided: Role of Therapy;Plan of Care  Education

## 2024-07-15 NOTE — WOUND CARE
Consulted for Chronic Sacral wound. Familiar to Wound team via previous admissions. Pt states wound has been getting better per SNF staff. Offered specialty wound bed, but Pt kindly refused at this time.    Sacrum 1x1x0.3cm, undermine 0.3cm@5-7 o'clock, full thickness, pink/red, granular, slough, skin blanchable/macerated, defined flat/epibole edges, scant serosanguinous drainage, no odor. Recommend cleanse with incontinence care or wound cleanser, large pink silicone border foam dressing every other day/PRN. Frequent turning/repositioning.  (Previous admission 5/23...)      L lateral heel 1x1cm blanchable redness, preventative border foam dressing placed. Elevate heels with pillows.

## 2024-07-15 NOTE — CARE COORDINATION
07/15/24 1208   Service Assessment   Patient Orientation Alert and Oriented   Cognition Alert   History Provided By Patient   Primary Caregiver Other (Comment)  (Pt is in STR at [facility])   Support Systems Other (Comment)  (Pt is in STR at [facility])   Patient's Healthcare Decision Maker is: Legal Next of Kin   PCP Verified by CM Yes   Last Visit to PCP Within last year   Prior Functional Level Independent in ADLs/IADLs   Current Functional Level Independent in ADLs/IADLs   Can patient return to prior living arrangement Unknown at present   Ability to make needs known: Good   Family able to assist with home care needs: Yes   Would you like for me to discuss the discharge plan with any other family members/significant others, and if so, who? Yes   Financial Resources Medicare   Social/Functional History   Lives With Alone   Receives Help From Friend(s)   Active  Yes   Mode of Transportation Car   Discharge Planning   Living Arrangements Alone   Patient expects to be discharged to: Skilled nursing facility   Services At/After Discharge   Mode of Transport at Discharge BLS     CM met with pt at bedside, he was admitted from [facility] and will return there to complete STR, pt does not want to change facilities. Prior to STR, pt lived alone, drives, has friends to help him if needed. Was using [facility] for RN, PT, OT and would use them again. DME-RW. Pt will need BLS at discharge. PT/OT/SLP pending. CM will follow for discharge plan.

## 2024-07-16 LAB
ALBUMIN SERPL-MCNC: 2.6 G/DL (ref 3.2–4.6)
ALBUMIN/GLOB SERPL: 0.8 (ref 1–1.9)
ALP SERPL-CCNC: 100 U/L (ref 40–129)
ALT SERPL-CCNC: 12 U/L (ref 12–65)
ANION GAP SERPL CALC-SCNC: 8 MMOL/L (ref 9–18)
AST SERPL-CCNC: 18 U/L (ref 15–37)
BASOPHILS # BLD: 0 K/UL (ref 0–0.2)
BASOPHILS NFR BLD: 0 % (ref 0–2)
BILIRUB SERPL-MCNC: <0.2 MG/DL (ref 0–1.2)
BUN SERPL-MCNC: 21 MG/DL (ref 8–23)
CALCIUM SERPL-MCNC: 8.9 MG/DL (ref 8.8–10.2)
CHLORIDE SERPL-SCNC: 106 MMOL/L (ref 98–107)
CO2 SERPL-SCNC: 23 MMOL/L (ref 20–28)
CREAT SERPL-MCNC: 1.23 MG/DL (ref 0.8–1.3)
DIFFERENTIAL METHOD BLD: ABNORMAL
EOSINOPHIL # BLD: 1.3 K/UL (ref 0–0.8)
EOSINOPHIL NFR BLD: 22 % (ref 0.5–7.8)
ERYTHROCYTE [DISTWIDTH] IN BLOOD BY AUTOMATED COUNT: 15 % (ref 11.9–14.6)
GLOBULIN SER CALC-MCNC: 3.1 G/DL (ref 2.3–3.5)
GLUCOSE SERPL-MCNC: 87 MG/DL (ref 70–99)
HCT VFR BLD AUTO: 34.5 % (ref 41.1–50.3)
HGB BLD-MCNC: 10.4 G/DL (ref 13.6–17.2)
IMM GRANULOCYTES # BLD AUTO: 0 K/UL (ref 0–0.5)
IMM GRANULOCYTES NFR BLD AUTO: 1 % (ref 0–5)
LYMPHOCYTES # BLD: 0.6 K/UL (ref 0.5–4.6)
LYMPHOCYTES NFR BLD: 9 % (ref 13–44)
MAGNESIUM SERPL-MCNC: 1.6 MG/DL (ref 1.8–2.4)
MCH RBC QN AUTO: 28.3 PG (ref 26.1–32.9)
MCHC RBC AUTO-ENTMCNC: 30.1 G/DL (ref 31.4–35)
MCV RBC AUTO: 93.8 FL (ref 82–102)
MONOCYTES # BLD: 0.7 K/UL (ref 0.1–1.3)
MONOCYTES NFR BLD: 11 % (ref 4–12)
NEUTS SEG # BLD: 3.4 K/UL (ref 1.7–8.2)
NEUTS SEG NFR BLD: 57 % (ref 43–78)
NRBC # BLD: 0 K/UL (ref 0–0.2)
PLATELET # BLD AUTO: 231 K/UL (ref 150–450)
PMV BLD AUTO: 9 FL (ref 9.4–12.3)
POTASSIUM SERPL-SCNC: 4 MMOL/L (ref 3.5–5.1)
PROT SERPL-MCNC: 5.6 G/DL (ref 6.3–8.2)
RBC # BLD AUTO: 3.68 M/UL (ref 4.23–5.6)
SODIUM SERPL-SCNC: 137 MMOL/L (ref 136–145)
WBC # BLD AUTO: 6 K/UL (ref 4.3–11.1)

## 2024-07-16 PROCEDURE — 80053 COMPREHEN METABOLIC PANEL: CPT

## 2024-07-16 PROCEDURE — 83735 ASSAY OF MAGNESIUM: CPT

## 2024-07-16 PROCEDURE — 92526 ORAL FUNCTION THERAPY: CPT

## 2024-07-16 PROCEDURE — 51702 INSERT TEMP BLADDER CATH: CPT

## 2024-07-16 PROCEDURE — 6360000002 HC RX W HCPCS: Performed by: FAMILY MEDICINE

## 2024-07-16 PROCEDURE — 85025 COMPLETE CBC W/AUTO DIFF WBC: CPT

## 2024-07-16 PROCEDURE — 6370000000 HC RX 637 (ALT 250 FOR IP): Performed by: FAMILY MEDICINE

## 2024-07-16 PROCEDURE — 36415 COLL VENOUS BLD VENIPUNCTURE: CPT

## 2024-07-16 PROCEDURE — 6370000000 HC RX 637 (ALT 250 FOR IP): Performed by: NURSE PRACTITIONER

## 2024-07-16 PROCEDURE — 1100000000 HC RM PRIVATE

## 2024-07-16 PROCEDURE — 92612 ENDOSCOPY SWALLOW (FEES) VID: CPT

## 2024-07-16 PROCEDURE — 6360000002 HC RX W HCPCS: Performed by: STUDENT IN AN ORGANIZED HEALTH CARE EDUCATION/TRAINING PROGRAM

## 2024-07-16 PROCEDURE — 2580000003 HC RX 258: Performed by: FAMILY MEDICINE

## 2024-07-16 PROCEDURE — 97530 THERAPEUTIC ACTIVITIES: CPT

## 2024-07-16 RX ORDER — MAGNESIUM SULFATE IN WATER 40 MG/ML
2000 INJECTION, SOLUTION INTRAVENOUS ONCE
Status: COMPLETED | OUTPATIENT
Start: 2024-07-16 | End: 2024-07-16

## 2024-07-16 RX ADMIN — VANCOMYCIN HYDROCHLORIDE 500 MG: 125 CAPSULE ORAL at 12:35

## 2024-07-16 RX ADMIN — MAGNESIUM SULFATE HEPTAHYDRATE 2000 MG: 40 INJECTION, SOLUTION INTRAVENOUS at 08:43

## 2024-07-16 RX ADMIN — VANCOMYCIN HYDROCHLORIDE 500 MG: 125 CAPSULE ORAL at 08:40

## 2024-07-16 RX ADMIN — VANCOMYCIN HYDROCHLORIDE 500 MG: 125 CAPSULE ORAL at 21:04

## 2024-07-16 RX ADMIN — CALCIUM CARBONATE (ANTACID) CHEW TAB 500 MG 500 MG: 500 CHEW TAB at 21:04

## 2024-07-16 RX ADMIN — VANCOMYCIN HYDROCHLORIDE 500 MG: 125 CAPSULE ORAL at 17:57

## 2024-07-16 RX ADMIN — SODIUM CHLORIDE, PRESERVATIVE FREE 10 ML: 5 INJECTION INTRAVENOUS at 21:05

## 2024-07-16 RX ADMIN — ENOXAPARIN SODIUM 40 MG: 100 INJECTION SUBCUTANEOUS at 17:57

## 2024-07-16 RX ADMIN — CLONAZEPAM 4 MG: 1 TABLET ORAL at 21:04

## 2024-07-16 RX ADMIN — METRONIDAZOLE 500 MG: 500 INJECTION, SOLUTION INTRAVENOUS at 06:36

## 2024-07-16 RX ADMIN — SODIUM CHLORIDE: 9 INJECTION, SOLUTION INTRAVENOUS at 23:01

## 2024-07-16 RX ADMIN — AMITRIPTYLINE HYDROCHLORIDE 100 MG: 50 TABLET, FILM COATED ORAL at 21:05

## 2024-07-16 RX ADMIN — LEVOTHYROXINE SODIUM 125 MCG: 0.07 TABLET ORAL at 06:29

## 2024-07-16 RX ADMIN — SODIUM CHLORIDE: 9 INJECTION, SOLUTION INTRAVENOUS at 23:43

## 2024-07-16 RX ADMIN — OLANZAPINE 2.5 MG: 5 TABLET, FILM COATED ORAL at 21:04

## 2024-07-16 RX ADMIN — QUETIAPINE FUMARATE 25 MG: 25 TABLET ORAL at 21:04

## 2024-07-16 RX ADMIN — SODIUM CHLORIDE, PRESERVATIVE FREE 10 ML: 5 INJECTION INTRAVENOUS at 11:11

## 2024-07-16 RX ADMIN — OXYBUTYNIN CHLORIDE 10 MG: 10 TABLET, EXTENDED RELEASE ORAL at 08:40

## 2024-07-16 RX ADMIN — CALCIUM CARBONATE (ANTACID) CHEW TAB 500 MG 500 MG: 500 CHEW TAB at 08:40

## 2024-07-16 RX ADMIN — SODIUM CHLORIDE: 9 INJECTION, SOLUTION INTRAVENOUS at 06:35

## 2024-07-16 RX ADMIN — METRONIDAZOLE 500 MG: 500 INJECTION, SOLUTION INTRAVENOUS at 23:02

## 2024-07-16 RX ADMIN — METRONIDAZOLE 500 MG: 500 INJECTION, SOLUTION INTRAVENOUS at 15:45

## 2024-07-16 NOTE — PLAN OF CARE
Problem: Discharge Planning  Goal: Discharge to home or other facility with appropriate resources  7/16/2024 1555 by Rebecca Villela, RN  Outcome: Progressing  7/16/2024 0456 by Nirav Muir RN  Outcome: Progressing     Problem: Skin/Tissue Integrity  Goal: Absence of new skin breakdown  Description: 1.  Monitor for areas of redness and/or skin breakdown  2.  Assess vascular access sites hourly  3.  Every 4-6 hours minimum:  Change oxygen saturation probe site  4.  Every 4-6 hours:  If on nasal continuous positive airway pressure, respiratory therapy assess nares and determine need for appliance change or resting period.  7/16/2024 0456 by Nirav Muir RN  Outcome: Progressing     Problem: ABCDS Injury Assessment  Goal: Absence of physical injury  7/16/2024 0456 by Nirav Muir RN  Outcome: Progressing     Problem: Safety - Adult  Goal: Free from fall injury  7/16/2024 1555 by Rebecca Villela, RN  Outcome: Progressing  7/16/2024 0456 by Nirav Muir RN  Outcome: Progressing

## 2024-07-16 NOTE — CONSULTS
Comprehensive Nutrition Assessment    Type and Reason for Visit:    Unintentional Weight Loss (Hospitalists) and Best Practice Alert for Pressure Injury Stage 2 or greater    Nutrition Recommendations/Plan:   Meals and Snacks:  Diet: Continue current order  Milk with meals.   Nutrition Supplement Therapy:  Medical food supplement therapy:  Initiate Ensure Enlive twice per day (this provides 350 kcal and 20 grams protein per bottle)     Malnutrition Assessment:  Malnutrition Status: Severe malnutrition  Context: Chronic Illness  Findings of clinical characteristics of malnutrition:   Energy Intake:  Unable to assess  Weight Loss:  Unable to assess (bed scale wt from last admisison similar to today, Potential 12% loss identified in May (unable to validate))     Body Fat Loss:  Severe body fat loss Buccal region, Triceps (moderate loss orbital, ribs)   Muscle Mass Loss:  Severe muscle mass loss Temples (temporalis), Clavicles (pectoralis & deltoids), Scapula (trapezius), Calf (gastrocnemius), Thigh (quadriceps) (moderate loss hands)     Nutrition Assessment:  Nutrition History:   Pt well known to nutrition services, hx of PN associated with rectal ca colostomy in 2019, last eval inpt 5/23/24 consistent with prior pt reports of one meal per day and having 30 gram protein ensure available.   Today pt tells me he has been eating well at facility, drinks ensure ad [place]our[place] but does not provide a quantifiable nutrition hx.   He fixates on inpt stay with nectar thick liquids until FEES today.       Do You Have Any Cultural, Orthodox, or Ethnic Food Preferences?: No   Weight History:   Per RD note 5/23/24:  Pt denies any recent changes in wt pta. Per EMR wt hx review of internal medicine office visits 8/7 111lb, 9/13 109lb, 1/11 101lb 3/28 114lb. If current estimated wt is correct there is potential for pt to have lost ~12% since March 2024. This is clinically significant wt loss.   Pt does not have any outpt encounters with wt

## 2024-07-17 VITALS
SYSTOLIC BLOOD PRESSURE: 138 MMHG | HEIGHT: 68 IN | HEART RATE: 94 BPM | DIASTOLIC BLOOD PRESSURE: 79 MMHG | RESPIRATION RATE: 16 BRPM | TEMPERATURE: 98.1 F | OXYGEN SATURATION: 93 % | WEIGHT: 109.79 LBS | BODY MASS INDEX: 16.64 KG/M2

## 2024-07-17 LAB
ALBUMIN SERPL-MCNC: 2.6 G/DL (ref 3.2–4.6)
ALBUMIN/GLOB SERPL: 0.9 (ref 1–1.9)
ALP SERPL-CCNC: 97 U/L (ref 40–129)
ALT SERPL-CCNC: 13 U/L (ref 12–65)
ANION GAP SERPL CALC-SCNC: 8 MMOL/L (ref 9–18)
AST SERPL-CCNC: 23 U/L (ref 15–37)
BACTERIA SPEC CULT: ABNORMAL
BACTERIA SPEC CULT: ABNORMAL
BASOPHILS # BLD: 0 K/UL (ref 0–0.2)
BASOPHILS NFR BLD: 0 % (ref 0–2)
BILIRUB SERPL-MCNC: <0.2 MG/DL (ref 0–1.2)
BUN SERPL-MCNC: 22 MG/DL (ref 8–23)
CALCIUM SERPL-MCNC: 9.2 MG/DL (ref 8.8–10.2)
CHLORIDE SERPL-SCNC: 104 MMOL/L (ref 98–107)
CO2 SERPL-SCNC: 27 MMOL/L (ref 20–28)
CREAT SERPL-MCNC: 1.14 MG/DL (ref 0.8–1.3)
CRP SERPL-MCNC: 8 MG/L (ref 0–10)
DIFFERENTIAL METHOD BLD: ABNORMAL
EOSINOPHIL # BLD: 1.6 K/UL (ref 0–0.8)
EOSINOPHIL NFR BLD: 24 % (ref 0.5–7.8)
ERYTHROCYTE [DISTWIDTH] IN BLOOD BY AUTOMATED COUNT: 14.8 % (ref 11.9–14.6)
GLOBULIN SER CALC-MCNC: 3 G/DL (ref 2.3–3.5)
GLUCOSE BLD STRIP.AUTO-MCNC: 86 MG/DL (ref 65–100)
GLUCOSE SERPL-MCNC: 82 MG/DL (ref 70–99)
HCT VFR BLD AUTO: 34.3 % (ref 41.1–50.3)
HGB BLD-MCNC: 10.4 G/DL (ref 13.6–17.2)
IMM GRANULOCYTES # BLD AUTO: 0 K/UL (ref 0–0.5)
IMM GRANULOCYTES NFR BLD AUTO: 0 % (ref 0–5)
LYMPHOCYTES # BLD: 0.7 K/UL (ref 0.5–4.6)
LYMPHOCYTES NFR BLD: 11 % (ref 13–44)
MAGNESIUM SERPL-MCNC: 1.7 MG/DL (ref 1.8–2.4)
MCH RBC QN AUTO: 28.3 PG (ref 26.1–32.9)
MCHC RBC AUTO-ENTMCNC: 30.3 G/DL (ref 31.4–35)
MCV RBC AUTO: 93.5 FL (ref 82–102)
MONOCYTES # BLD: 0.5 K/UL (ref 0.1–1.3)
MONOCYTES NFR BLD: 8 % (ref 4–12)
NEUTS SEG # BLD: 3.6 K/UL (ref 1.7–8.2)
NEUTS SEG NFR BLD: 57 % (ref 43–78)
NRBC # BLD: 0 K/UL (ref 0–0.2)
PLATELET # BLD AUTO: 217 K/UL (ref 150–450)
PMV BLD AUTO: 9.2 FL (ref 9.4–12.3)
POTASSIUM SERPL-SCNC: 4.2 MMOL/L (ref 3.5–5.1)
PROT SERPL-MCNC: 5.6 G/DL (ref 6.3–8.2)
RBC # BLD AUTO: 3.67 M/UL (ref 4.23–5.6)
SERVICE CMNT-IMP: ABNORMAL
SERVICE CMNT-IMP: NORMAL
SODIUM SERPL-SCNC: 138 MMOL/L (ref 136–145)
WBC # BLD AUTO: 6.4 K/UL (ref 4.3–11.1)

## 2024-07-17 PROCEDURE — 80053 COMPREHEN METABOLIC PANEL: CPT

## 2024-07-17 PROCEDURE — 2580000003 HC RX 258: Performed by: FAMILY MEDICINE

## 2024-07-17 PROCEDURE — 82962 GLUCOSE BLOOD TEST: CPT

## 2024-07-17 PROCEDURE — 6360000002 HC RX W HCPCS: Performed by: FAMILY MEDICINE

## 2024-07-17 PROCEDURE — 92526 ORAL FUNCTION THERAPY: CPT

## 2024-07-17 PROCEDURE — 6360000002 HC RX W HCPCS: Performed by: STUDENT IN AN ORGANIZED HEALTH CARE EDUCATION/TRAINING PROGRAM

## 2024-07-17 PROCEDURE — 36415 COLL VENOUS BLD VENIPUNCTURE: CPT

## 2024-07-17 PROCEDURE — 83735 ASSAY OF MAGNESIUM: CPT

## 2024-07-17 PROCEDURE — 6370000000 HC RX 637 (ALT 250 FOR IP): Performed by: FAMILY MEDICINE

## 2024-07-17 PROCEDURE — 85025 COMPLETE CBC W/AUTO DIFF WBC: CPT

## 2024-07-17 RX ORDER — ENOXAPARIN SODIUM 100 MG/ML
30 INJECTION SUBCUTANEOUS EVERY EVENING
Status: DISCONTINUED | OUTPATIENT
Start: 2024-07-17 | End: 2024-07-17 | Stop reason: HOSPADM

## 2024-07-17 RX ORDER — VANCOMYCIN HYDROCHLORIDE 250 MG/1
500 CAPSULE ORAL 4 TIMES DAILY
Qty: 56 CAPSULE | Refills: 0 | Status: SHIPPED | OUTPATIENT
Start: 2024-07-17 | End: 2024-07-24

## 2024-07-17 RX ORDER — MAGNESIUM SULFATE IN WATER 40 MG/ML
2000 INJECTION, SOLUTION INTRAVENOUS ONCE
Status: COMPLETED | OUTPATIENT
Start: 2024-07-17 | End: 2024-07-17

## 2024-07-17 RX ADMIN — SODIUM CHLORIDE, PRESERVATIVE FREE 10 ML: 5 INJECTION INTRAVENOUS at 09:25

## 2024-07-17 RX ADMIN — MAGNESIUM SULFATE HEPTAHYDRATE 2000 MG: 40 INJECTION, SOLUTION INTRAVENOUS at 09:27

## 2024-07-17 RX ADMIN — METRONIDAZOLE 500 MG: 500 INJECTION, SOLUTION INTRAVENOUS at 09:33

## 2024-07-17 RX ADMIN — CALCIUM CARBONATE (ANTACID) CHEW TAB 500 MG 500 MG: 500 CHEW TAB at 09:24

## 2024-07-17 RX ADMIN — VANCOMYCIN HYDROCHLORIDE 500 MG: 125 CAPSULE ORAL at 09:24

## 2024-07-17 RX ADMIN — OXYBUTYNIN CHLORIDE 10 MG: 10 TABLET, EXTENDED RELEASE ORAL at 09:24

## 2024-07-17 RX ADMIN — LEVOTHYROXINE SODIUM 125 MCG: 0.07 TABLET ORAL at 05:30

## 2024-07-17 NOTE — DISCHARGE SUMMARY
Hospitalist Discharge Summary   Admit Date:  2024 11:42 AM   DC Note date: 2024  Name:  Peter Nolasco   Age:  78 y.o.  Sex:  male  :  1945   MRN:  [id number]   Room:  [facility]  PCP:  Bukcy Armenta DO    Presenting Complaint: Diarrhea     Initial Admission Diagnosis: C. difficile diarrhea [A04.72]  Troponin I above reference range [R79.89]  Hypotension, unspecified hypotension type [I95.9]  Urinary tract infection without hematuria, site unspecified [N39.0]  Diarrhea, unspecified type [R19.7]     Problem List for this Hospitalization (present on admission):    Principal Problem:    C. difficile diarrhea  Active Problems:    Mild episode of recurrent major depressive disorder (HCC)    Severe dehydration    Anemia    Decreased independence with activities of daily living    Hypothyroidism    C. difficile colitis    Colostomy care (HCC)    Chronic indwelling John catheter    Bipolar disorder, unspecified (HCC)    Dysphagia, oropharyngeal phase    Primary hypertension    Presence of colostomy ([facility])    Acquired hypothyroidism    Metabolic acidosis, NAG, bicarbonate losses    Diarrhea    Hypotension    Hypoglycemia    Bacteriuria with pyuria    Hypocalcemia    Hypoalbuminemia    Troponin I above reference range    ST segment depression    History of pulmonary embolism    Severe protein-calorie malnutrition ([place]: less than 60% of standard weight) ([facility])    Asymptomatic bacteriuria    DNR (do not resuscitate)    RBBB  Resolved Problems:    * No resolved hospital problems. *      Hospital Course:  Peter Nolasco is a 78 y.o. 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  Recently tested positive for C. difficile at the facility but had not been improving on oral vancomycin so he

## 2024-07-17 NOTE — PROGRESS NOTES
Hospitalist Progress Note   Admit Date:  2024 11:42 AM   Name:  Peter Nolasco   Age:  78 y.o.  Sex:  male  :  1945   MRN:  [id number]   Room:  [facility]    Presenting/Chief Complaint: Diarrhea     Reason(s) for Admission: C. difficile diarrhea [A04.72]  Troponin I above reference range [R79.89]  Hypotension, unspecified hypotension type [I95.9]  Urinary tract infection without hematuria, site unspecified [N39.0]  Diarrhea, unspecified type [R19.7]     Hospital Course:   Peter Nolasco is a 78 y.o. 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  Recently tested positive for C. difficile at the facility but had not been improving on oral vancomycin so he was sent to the ED.  In the ED, he was hypotensive but improved with IV fluids.  He was started on a higher dose of p.o. vancomycin, IV Flagyl, IV fluids, and admitted for further care.      Subjective & 24hr Events:   No acute overnight events.  Diarrhea has resolved.  Denies any nausea, vomiting, or abdominal pain.  Was noted to have some coughing with thin liquids and SLP planning for FEES today.    Assessment & Plan:     Fulminant C diff colitis (POA)  Improving  Continue p.o. vancomycin and IV Flagyl    Hypotension  Normal anion gap metabolic acidosis  Resolved  Repeat BMP in the a.m.    Dysphagia  SLP following  FEES today    Chronic John catheter  Asymptomatic bacteriuria  Admitting hospitalist spoke with urology who agreed with monitoring off antibiotics for now as UA results are likely colonization rather than active infection  Continue oxybutynin    Hypoglycemia  Resolved    Elevated troponin  Due to increased myocardial demand; ACS ruled out     Chronic anemia  Hemoglobin stable with no evidence of active 
       Hospitalist Progress Note   Admit Date:  2024 11:42 AM   Name:  Peter Nolasco   Age:  78 y.o.  Sex:  male  :  1945   MRN:  [id number]   Room:  [facility]    Presenting/Chief Complaint: Diarrhea     Reason(s) for Admission: C. difficile diarrhea [A04.72]  Troponin I above reference range [R79.89]  Hypotension, unspecified hypotension type [I95.9]  Urinary tract infection without hematuria, site unspecified [N39.0]  Diarrhea, unspecified type [R19.7]     Hospital Course:   Peter Nolasco is a 78 y.o. male with medical history of chronic John, ureteral stenosis s/p stenting with recent right stent exchange, rectal cancer s/p colostomy, and hypothyroidism who presented to the ED from [facility] on 2024 with cc of hypotension and diarrhea.  Recently tested positive for C. difficile at the facility but had not been improving on oral vancomycin so he was sent to the ED.  In the ED, he was hypotensive but improved with IV fluids.  He was started on a higher dose of p.o. vancomycin, IV Flagyl, IV fluids, and admitted for further care.      Subjective & 24hr Events:   No acute overnight events.  Patient is feeling better today and has had no diarrhea so far this morning.  Denies any abdominal pain or nausea.    Assessment & Plan:     Fulminant C diff colitis (POA)  Continue p.o. vancomycin and IV Flagyl    Hypotension  Normal anion gap metabolic acidosis  Resolved  DC IV fluids and sodium bicarb tablets  Repeat BMP in the a.m.    Chronic John catheter  Asymptomatic bacteriuria  Admitting hospitalist spoke with urology who agreed with monitoring off antibiotics for now as UA results are likely colonization rather than active infection  Continue oxybutynin    Hypoglycemia  Resolved    Elevated troponin  Due to increased myocardial demand; ACS ruled out     Chronic anemia  Hemoglobin stable with no evidence of active bleeding  Trend hemoglobin    Hypothyroidism  Continue 
  Flexible Endoscopic Evaluation of Swallowing (FEES) Consent Form    Explanation:  A FEES exam entails passing a flexible fiberoptic endoscope through the nose into the hypopharynx achieving a panoramic view of the pharynx and larynx. The scope remains above the level of the true vocal folds and trachea throughout the examination. A variety of foods and liquids will be given in order to observe the swallow function and address any problems. The patient's medical history has been reviewed and need for the FEES examination was judged as necessary and appropriate by the patient's physician with medical order received.     Possible adverse reactions: Epistasis, Fainting, laryngospasm, Aspiration, Sneezing, runny nose, Vasovagal episode    Benefits of FEES exam:  FEES assessment may identify a loss of airway protective reflexes as well as swallowing problems. Based on this information, recommendations will be made regarding diet modifications to prevent food and liquid from entering the airway during the swallow.     Alternatives:   Alternatives to the FEES exam are the modified barium swallow study, which entails administration of food and liquids coated with barium while undergoing x-ray. The other alternative is the bedside swallow evaluation, which involves presentation of foods and liquids while observing various symptoms suggestive of a swallowing problem and possible aspiration (food/liquid entering the trachea). A bedside swallowing evaluation is not able to fully confirm or rule out the presence of aspiration.     SLP discussed the risks, benefits, expected outcome, and alternative to the recommended procedure with the patient.  Patient was given the opportunity to ask questions about the procedure. Patient expresses understanding and wants to proceed.         
4 Eyes Skin Assessment     NAME:  Peter Nolasco  YOB: 1945  MEDICAL RECORD NUMBER:  [id number]    The patient is being assessed for  Admission    I agree that at least one RN has performed a thorough Head to Toe Skin Assessment on the patient. ALL assessment sites listed below have been assessed.      Areas assessed by both nurses:    Head, Face, Ears, Shoulders, Back, Chest, Arms, Elbows, Hands, Sacrum. Buttock, Coccyx, Ischium, Legs. Feet and Heels, and Under Medical Devices         Does the Patient have a Wound? Yes wound(s) were present on assessment. LDA wound assessment was Initiated and completed by RN       Rigoberto Prevention initiated by RN: Yes  Wound Care Orders initiated by RN: Yes    Pressure Injury (Stage 3,4, Unstageable, DTI, NWPT, and Complex wounds) if present, place Wound referral order by RN under : Yes    New Ostomies, if present place, Ostomy referral order under : No     Nurse 1 eSignature: Electronically signed by Elba Deutsch RN on 7/14/24 at 6:27 PM EDT    **SHARE this note so that the co-signing nurse can place an eSignature**    Nurse 2 eSignature: Electronically signed by Keiry Haines RN on 7/14/24 at 6:30 PM EDT    
ACUTE OCCUPATIONAL THERAPY GOALS:   (Developed with and agreed upon by patient and/or caregiver.)  1. Patient will complete lower body bathing and dressing with supervision and adaptive equipment as needed.   2. Patient will complete toileting with supervision.   3. Patient will tolerate 30 minutes of OT treatment with 1-2 rest breaks to increase activity tolerance for ADLs.   4. Patient will complete functional transfers with supervision and adaptive equipment as needed.   5. Patient will complete functional mobility for household distances with supervision and good safety awareness.     Timeframe: 7 visits       OCCUPATIONAL THERAPY Initial Assessment, Daily Note, and AM       OT Visit Days: 1  Acknowledge Orders  Time  OT Charge Capture  Rehab Caseload Tracker      Peter Nolasco is a 78 y.o. male   PRIMARY DIAGNOSIS: C. difficile diarrhea  C. difficile diarrhea [A04.72]  Troponin I above reference range [R79.89]  Hypotension, unspecified hypotension type [I95.9]  Urinary tract infection without hematuria, site unspecified [N39.0]  Diarrhea, unspecified type [R19.7]       Reason for Referral: Generalized Muscle Weakness (M62.81)  Other lack of cordination (R27.8)  History of falling (Z91.81)  Inpatient: Payor: MEDICARE / Plan: MEDICARE PART A AND B / Product Type: *No Product type* /     ASSESSMENT:     REHAB RECOMMENDATIONS:   Recommendation to date pending progress:  Setting:  Return to Rehab    Equipment:    To Be Determined     ASSESSMENT:  Mr. Nolasco presents to the hospital with hypotension, UTI, and diarrhea. Pt is supine in the bed upon arrival and familiar to this therapist due to hx of multiple hospitalizations. Pt is very pleasant and eager to get up and move. Pt typically lives alone and is modified independent with ADL and uses a walker for functional mobility. Pt does admit to hx of falling. Pt is frail appearing and does have some muscle atrophy but overall did well with session today. Pt 
ACUTE PHYSICAL THERAPY GOALS:   (Developed with and agreed upon by patient and/or caregiver.)  (1.) Peter Nolasco  will move from supine to sit and sit to supine  and scoot up and down with MODIFIED INDEPENDENCE within 7 treatment day(s).    (2.) Peter Nolasco will transfer from bed to chair and chair to bed with SUPERVISION using the least restrictive device within 7 treatment day(s).    (3.) Peter Nolasco will ambulate with SUPERVISION for 1000 feet with the least restrictive device within 7 treatment day(s).   (4.) Peter Nolasco will perform standing static and dynamic balance activities x 10 minutes with SUPERVISION to improve safety within 7 treatment day(s).  (5.) Peter Nolasco will ascend and descend 12 stairs using 1 hand rail(s) with SUPERVISION to improve functional mobility and safety within 7 treatment day(s).  (6.) Peter Nolasco will perform therapeutic exercises x 15 min for HEP with verbal cues to improve strength, endurance, and functional mobility within 7 treatment day(s).     PHYSICAL THERAPY: Daily Note AM   (Link to Caseload Tracking: PT Visit Days : 2  Time In/Out PT Charge Capture  Rehab Caseload Tracker  Orders    Peter Nolasco is a 78 y.o. male   PRIMARY DIAGNOSIS: C. difficile diarrhea  C. difficile diarrhea [A04.72]  Troponin I above reference range [R79.89]  Hypotension, unspecified hypotension type [I95.9]  Urinary tract infection without hematuria, site unspecified [N39.0]  Diarrhea, unspecified type [R19.7]     Inpatient: Payor: MEDICARE / Plan: MEDICARE PART A AND B / Product Type: *No Product type* /     ASSESSMENT:     REHAB RECOMMENDATIONS:   Recommendation to date pending progress:  Setting:  Rehab  (return to facility)    Equipment:    To Be Determined     ASSESSMENT:    Mr. Nolasco presents resting in bed, very eager to mobilize and work with therapy. This date pt performs mobility including bed mobility, sitting balance activities, sit 
Contacted [facility] and HIPAA compliant message left. Attempting to complete medication list and plan to request list to be faxed but no answer. Pt also would like facility to know that he was admitted.   
GOALS:  LTG: Patient will maintain adequate hydration/nutrition with optimum safety and efficiency of swallowing function with PO intake without overt signs or symptoms of aspiration for the highest appropriate diet level.  STG:  Patient will consume easy to chew textures and mildly thick liquids (nectar) without overt signs or symptoms of airway compromise.  Patient will perform effortful swallow, alternate liquids/solids, small bites and sips, and slow rate of PO intake to improve swallow safety with minimal cues 100% of the time.  Patient will safely ingest diet trials during therapeutic feedings with SLP without overt signs or symptoms of respiratory compromise in efforts to advance diet.    SPEECH LANGUAGE PATHOLOGY: DYSPHAGIA Initial Assessment    Acknowledge Order  I  Therapy Time  I   Charges     I  Rehab Caseload Tracker      NAME: Peter Nolasco  : 1945  MRN: [id number]    ADMISSION DATE: 2024  PRIMARY DIAGNOSIS: C. difficile diarrhea    ICD-10: Treatment Diagnosis: R13.13 Dysphagia, Pharyngeal Phase    RECOMMENDATIONS   Diet:    Regular Consistency  Thin Liquids    Medication: as tolerated   Compensatory Swallowing Strategies:   Alternate solids and liquids  Slow rate of intake  Remain upright for 30-45 minutes after meals  Small bites/sips  Upright as possible for all oral intake   Therapeutic Intervention:   Patient/family education  Dysphagia treatment   Patient continues to require skilled intervention:  Yes. Recommend ongoing speech therapy services during this hospitalization.     Anticipated Discharge Needs: Ongoing speech therapy is recommended at next level of care.      ASSESSMENT    Patient presents with mild-moderate suspected pharyngeal dysphagia characterized by overt clinical s/s of aspiration on 3/6 thin liquid trials presented via straw. Patient consumed pureed solids and course crackers without difficulty. Discussed diet downgrade and rationale with patient, who 
GOALS:  LTG: Patient will maintain adequate hydration/nutrition with optimum safety and efficiency of swallowing function with PO intake without overt signs or symptoms of aspiration for the highest appropriate diet level.  STG: PROGRESSING 24  Patient will consume easy to chew textures and thin liquids without overt signs or symptoms of airway compromise. Updated 2024  Patient will utilize small bites/sips and reduced rate with PO intake to improve swallow safety with minimal cues 100% of the time. Progressing 2024  Patient will safely ingest diet trials during therapeutic feedings with SLP without overt signs or symptoms of respiratory compromise in efforts to advance diet. Progressing 2024  Patient will complete a diagnostic instrumental assessment of dysphagia to fully assess physiology and anatomy of the swallow and determine safest appropriate diet and/or rehabilitation strategies, as medically indicated. Completed 2024    SPEECH LANGUAGE PATHOLOGY: Dysphagia Daily Note #2    Acknowledge Order  I  Therapy Time  I   Charges     I  Rehab Caseload Tracker  NAME: Peter Nolasco  : 1945  MRN: [id number]    ADMISSION DATE: 2024  PRIMARY DIAGNOSIS: C. difficile diarrhea    ICD-10: Treatment Diagnosis: R13.13 Dysphagia, Pharyngeal Phase    RECOMMENDATIONS   Diet:    Easy to Chew  Thin Liquids    Medication:  Recommend to provide individually. Patient requesting taking 2-3 meds if small which is OK as well.     Compensatory Swallowing Strategies:   Upright for all PO  Small bites and sips  Slow rate of PO intake   Therapeutic Intervention:   Patient/family education  Dysphagia treatment   Patient continues to require skilled intervention:  Yes. Recommend ongoing speech therapy services during this hospitalization.     Anticipated Discharge Needs: Ongoing speech therapy is recommended at next level of care.      ASSESSMENT    Dysphagia: Reviewed results from Fiberoptic 
Patient discharge to home, via stretcher. 22 gauge, left hand d/c, pressure dressing applied, bleeding controlled. Patient belongings packed and sent with EMS staff. John, emptied, prior to discharge. Colostomy bag patent, emptied.  
Patient in bed alert and oriented, speech therapy at bedside, recommending taking medications one-at-time. Ok to take 2 pills at a time as long as they are small. Slurred speech at baseline. John, draining florentin color urine. Diet easy to chew. Patient uses a walker at baseline, lives alone. 22 Gauge left lower F/A patent.  
Pt has not had output in ostomy to collect for lab. Pt states that it only puts stool out when he eats. Will pass along to oncoming nurse.     Pt also has concerns about meds, states that he takes both Seroquel AND Zyprexa HS, however admitting did not want both.   Also, pt states that he takes 4 mg of clonazepam instead of 2 prescribed.   Awaiting medreq from snf.  Will pass along to oncoming RN    
Pt resting, denies needs at this time.  C Diff lab came back negative despite testing positive at his facility, [facility].  IVF fluids infusing.  Abx given as ordered.  Worked with PT today.  Colostomy bag emptied as needed.  Hourly rounds completed.  Bed locked and lowered into lowest position.  Call light and personal items within reach.  Will give report to oncoming nurse.  
Pt resting, denies needs at this time.  IVF fluids infusing.  Abx given as ordered.  Wound care completed as ordered.  Colostomy bag emptied as needed.  Hourly rounds completed.  Bed locked and lowered into lowest position.  Call light and personal items within reach.  Will give report to oncoming nurse.   
TRANSFER - IN REPORT:    Verbal report received from MIGUEL Milian on Peter Nolasco  being received from ED for routine progression of patient care      Report consisted of patient's Situation, Background, Assessment and   Recommendations(SBAR).     Information from the following report(s) ED SBAR was reviewed with the receiving nurse.    Opportunity for questions and clarification was provided.      Assessment completed upon patient's arrival to unit and care assumed.     
understanding    Safety:   Call light within reach  In Bed  RN notified     Therapy Time:  Time In: 1044  Time Out: 1100  Minutes: 16    Ita Wellington M.Ed, CF-SLP  7/16/2024 11:04 AM    
contacts the vocal cords, but is not ejected   6 Material enters the airway; passes below the vocal cords and is ejected   7 Material enters the airway; passes below the vocal cords, but is not ejected despite effort   8 Material enters the airway; passes below the vocal cords and no effort to eject     Dysphagia Outcome and Severity Scale (ARABELLA)  Score 6 Description   [] 7   Normal in all situations   [x] 6   Within Functional Limits/ Modified independent   [] 5   Mild Dysphagia: Distant Supervision. May need one diet consistency      restricted.   [] 4   Mild-Moderate Dysphagia: Intermittent supervision/cuing. One-two diet    consistencies restricted.   [] 3   Moderate Dysphagia: Total assistance, supervision, or strategies.       Two or more diet consistencies restricted.   [] 2   Moderate-Severe Dysphagia: Maximum assistance or maximum use     of strategies with partial po intake   [] 1   Severe dysphagia- NPO. Unable to tolerate any po safely     PLAN    Duration/Frequency: Continue to follow patient 2x/week for duration of hospitalization and/or until goals met    Rehabilitation Potential For Stated Goals: Good    Interdisciplinary Collaboration: RN/ PCT    Medical Necessity    Skilled intervention continues to be required due to medical complications.    Education:   Patient educated on Results of evaluation, Speech therapy recommendations, Role of speech therapy, SLP plan, and Diet recommendations  Education provided to Patient and RN  Education response: Verbalizes understanding    Safety:   Call light within reach  In chair  RN notified     Therapy Time:  Time In: 1130  Time Out: 1230  Minutes: 60    KEON PRATER  7/16/2024 1:00 PM

## 2024-07-17 NOTE — PLAN OF CARE
Problem: Discharge Planning  Goal: Discharge to home or other facility with appropriate resources  7/17/2024 1317 by Janeth José RN  Outcome: Adequate for Discharge  7/17/2024 1317 by Janeth José RN  Outcome: Adequate for Discharge  7/17/2024 0916 by Janeth José RN  Outcome: Progressing  7/17/2024 0426 by Nirav Muir RN  Outcome: Progressing     Problem: Skin/Tissue Integrity  Goal: Absence of new skin breakdown  Description: 1.  Monitor for areas of redness and/or skin breakdown  2.  Assess vascular access sites hourly  3.  Every 4-6 hours minimum:  Change oxygen saturation probe site  4.  Every 4-6 hours:  If on nasal continuous positive airway pressure, respiratory therapy assess nares and determine need for appliance change or resting period.  7/17/2024 1317 by Janeth José RN  Outcome: Adequate for Discharge  7/17/2024 1317 by Janeth José RN  Outcome: Adequate for Discharge  7/17/2024 0916 by Janeth José RN  Outcome: Progressing  7/17/2024 0426 by Nirav Muir RN  Outcome: Progressing     Problem: ABCDS Injury Assessment  Goal: Absence of physical injury  7/17/2024 1317 by Janeth José RN  Outcome: Adequate for Discharge  7/17/2024 1317 by Janeth José RN  Outcome: Adequate for Discharge  7/17/2024 0916 by Janeth José RN  Outcome: Progressing  7/17/2024 0426 by Nirav Muir RN  Outcome: Progressing     Problem: Safety - Adult  Goal: Free from fall injury  7/17/2024 1317 by Janeth José RN  Outcome: Adequate for Discharge  7/17/2024 1317 by Janeth José RN  Outcome: Adequate for Discharge  7/17/2024 0916 by Janeth José RN  Outcome: Progressing  7/17/2024 0426 by Nirav Muir RN  Outcome: Progressing

## 2024-07-17 NOTE — PLAN OF CARE
Problem: Discharge Planning  Goal: Discharge to home or other facility with appropriate resources  7/17/2024 1317 by Janeth José RN  Outcome: Adequate for Discharge  7/17/2024 0916 by Janeth José RN  Outcome: Progressing  7/17/2024 0426 by Nirav Muir RN  Outcome: Progressing     Problem: Skin/Tissue Integrity  Goal: Absence of new skin breakdown  Description: 1.  Monitor for areas of redness and/or skin breakdown  2.  Assess vascular access sites hourly  3.  Every 4-6 hours minimum:  Change oxygen saturation probe site  4.  Every 4-6 hours:  If on nasal continuous positive airway pressure, respiratory therapy assess nares and determine need for appliance change or resting period.  7/17/2024 1317 by Janeth José RN  Outcome: Adequate for Discharge  7/17/2024 0916 by Janeth José RN  Outcome: Progressing  7/17/2024 0426 by Nirav Muir RN  Outcome: Progressing     Problem: ABCDS Injury Assessment  Goal: Absence of physical injury  7/17/2024 1317 by Janeth José RN  Outcome: Adequate for Discharge  7/17/2024 0916 by Janeth José RN  Outcome: Progressing  7/17/2024 0426 by Nirav Muir RN  Outcome: Progressing     Problem: Safety - Adult  Goal: Free from fall injury  7/17/2024 1317 by Janeth José RN  Outcome: Adequate for Discharge  7/17/2024 0916 by Janeth José RN  Outcome: Progressing  7/17/2024 0426 by Nirav Muir RN  Outcome: Progressing

## 2024-07-17 NOTE — PLAN OF CARE
Problem: Discharge Planning  Goal: Discharge to home or other facility with appropriate resources  7/17/2024 0916 by Janeth José RN  Outcome: Progressing  7/17/2024 0426 by Nirav Muir RN  Outcome: Progressing     Problem: Skin/Tissue Integrity  Goal: Absence of new skin breakdown  Description: 1.  Monitor for areas of redness and/or skin breakdown  2.  Assess vascular access sites hourly  3.  Every 4-6 hours minimum:  Change oxygen saturation probe site  4.  Every 4-6 hours:  If on nasal continuous positive airway pressure, respiratory therapy assess nares and determine need for appliance change or resting period.  7/17/2024 0916 by Janeth José RN  Outcome: Progressing  7/17/2024 0426 by Nirav Muir RN  Outcome: Progressing     Problem: ABCDS Injury Assessment  Goal: Absence of physical injury  7/17/2024 0916 by Janeth José RN  Outcome: Progressing  7/17/2024 0426 by Nirav Muir RN  Outcome: Progressing     Problem: Safety - Adult  Goal: Free from fall injury  7/17/2024 0916 by Janeth José RN  Outcome: Progressing  7/17/2024 0426 by Nirav Muir RN  Outcome: Progressing

## 2024-07-17 NOTE — CARE COORDINATION
[facility] ambulance transport arranged for 1 pm for Mr. Nolasco to return to [facility] for continued STR, room 403, report [phone].  This plan is ok with Mr. Nolasco and with his RN.     07/17/24 4364   Service Assessment   Patient Orientation Alert and Oriented   Services At/After Discharge   Services At/After Discharge Skilled Nursing Facility (SNF)   Mode of Transport at Discharge [facility]   Condition of Participation: Discharge Planning   The Plan for Transition of Care is related to the following treatment goals: return to [facility] SNF   The Patient and/or Patient Representative was provided with a Choice of Provider? Patient   The Patient and/Or Patient Representative agree with the Discharge Plan? Yes   Freedom of Choice list was provided with basic dialogue that supports the patient's individualized plan of care/goals, treatment preferences, and shares the quality data associated with the providers?  Yes

## 2024-07-18 ENCOUNTER — CARE COORDINATION (OUTPATIENT)
Dept: CARE COORDINATION | Facility: CLINIC | Age: 79
End: 2024-07-18

## 2024-07-18 NOTE — CARE COORDINATION
Transition of care outreach postponed for 10 days due to patient's discharge to [facility] for short term rehab.

## 2024-07-22 NOTE — CARE COORDINATION
ACM reviewed discharge instructions, medical action plan and red flag symptoms with the patient who verbalized understanding. Discussed COVID vaccination status: No. Pt didn't attend today's urology visit as catheter system was changed out at ER. Discussed next appt 10/24/22. Discussed role fo ACM and CCM services. Pt declines services at this time. Patient was given an opportunity to verbalize any questions and concerns and agrees to contact ACM or health care provider for questions related to their healthcare. Detail Level: Simple Initiate Treatment: pimecrolimus 1 % topical cream apply twice per day to affected areas on face as needed Plan: Failed hydrocortisone and triamcinolone. As predominately face is involved, recommended switching to pimecrolimus BID. Contact office if not improving or if cost-prohibitive Render In Strict Bullet Format?: No Plan: Failed Drysol (irritation and rash) and glycoprryloate due to side effects. If tolerating and improving with samples of Qbrexa, contact office and try to get covered Initiate Treatment: Qbrexa once daily via samples

## 2024-07-29 ENCOUNTER — CARE COORDINATION (OUTPATIENT)
Dept: CARE COORDINATION | Facility: CLINIC | Age: 79
End: 2024-07-29

## 2024-07-29 NOTE — CARE COORDINATION
Care Transitions Post-Acute Facility Update Call    2024    Patient: Peter Nolasco Patient : 1945   MRN: [id number]  Reason for Admission: C diff  Discharge Date: 24 RARS: Readmission Risk Score: 30.8  [facility] and states patient is currently still @ the facility. Will continue to monitor for discharge.        Care Transitions Post Acute Facility Update    Care Transitions Interventions      Post Acute Facility Update   Post Acute Facility: [facility]          Nursing       Rehab/Functional       SW/Discharge Planning

## 2024-07-31 ENCOUNTER — OFFICE VISIT (OUTPATIENT)
Dept: INTERNAL MEDICINE CLINIC | Facility: CLINIC | Age: 79
End: 2024-07-31
Payer: MEDICARE

## 2024-07-31 VITALS
WEIGHT: 112 LBS | SYSTOLIC BLOOD PRESSURE: 90 MMHG | HEIGHT: 68 IN | BODY MASS INDEX: 16.97 KG/M2 | HEART RATE: 110 BPM | TEMPERATURE: 98.8 F | DIASTOLIC BLOOD PRESSURE: 60 MMHG | OXYGEN SATURATION: 99 %

## 2024-07-31 DIAGNOSIS — K51.40 PSEUDOPOLYPOSIS OF COLON WITHOUT COMPLICATION, UNSPECIFIED PART OF COLON (HCC): ICD-10-CM

## 2024-07-31 DIAGNOSIS — R29.898 LEG WEAKNESS, BILATERAL: Primary | ICD-10-CM

## 2024-07-31 DIAGNOSIS — I10 PRIMARY HYPERTENSION: ICD-10-CM

## 2024-07-31 DIAGNOSIS — R53.81 PHYSICAL DEBILITY: ICD-10-CM

## 2024-07-31 DIAGNOSIS — F41.9 ANXIETY: ICD-10-CM

## 2024-07-31 DIAGNOSIS — E44.0 MODERATE PROTEIN-CALORIE MALNUTRITION (HCC): ICD-10-CM

## 2024-07-31 PROCEDURE — 1111F DSCHRG MED/CURRENT MED MERGE: CPT | Performed by: INTERNAL MEDICINE

## 2024-07-31 PROCEDURE — 99214 OFFICE O/P EST MOD 30 MIN: CPT | Performed by: INTERNAL MEDICINE

## 2024-07-31 PROCEDURE — G8419 CALC BMI OUT NRM PARAM NOF/U: HCPCS | Performed by: INTERNAL MEDICINE

## 2024-07-31 PROCEDURE — 1036F TOBACCO NON-USER: CPT | Performed by: INTERNAL MEDICINE

## 2024-07-31 PROCEDURE — 3074F SYST BP LT 130 MM HG: CPT | Performed by: INTERNAL MEDICINE

## 2024-07-31 PROCEDURE — G8427 DOCREV CUR MEDS BY ELIG CLIN: HCPCS | Performed by: INTERNAL MEDICINE

## 2024-07-31 PROCEDURE — 3078F DIAST BP <80 MM HG: CPT | Performed by: INTERNAL MEDICINE

## 2024-07-31 PROCEDURE — 1123F ACP DISCUSS/DSCN MKR DOCD: CPT | Performed by: INTERNAL MEDICINE

## 2024-07-31 RX ORDER — HYDROXYZINE HYDROCHLORIDE 25 MG/1
25 TABLET, FILM COATED ORAL 3 TIMES DAILY
COMMUNITY
Start: 2024-05-08

## 2024-07-31 RX ORDER — VANCOMYCIN HYDROCHLORIDE 250 MG/1
250 CAPSULE ORAL 4 TIMES DAILY
COMMUNITY
Start: 2024-07-30 | End: 2024-07-31 | Stop reason: ALTCHOICE

## 2024-07-31 RX ORDER — CARVEDILOL 6.25 MG/1
6.25 TABLET ORAL 2 TIMES DAILY WITH MEALS
COMMUNITY
Start: 2024-07-25

## 2024-07-31 NOTE — PROGRESS NOTES
Peter was seen today for follow-up.    Diagnoses and all orders for this visit:    Leg weakness, bilateral  -     External Referral to Physical Therapy    Physical debility  -     External Referral to Physical Therapy    Pseudopolyposis of colon without complication, unspecified part of colon (HCC)    Discussed more calories, fluid   Return in about 4 months (around 11/30/2024) for mwe, labs at  visit, f/u condition. Procedures    Peter Nolasco is a 78 y.o. male    Chief Complaint   Patient presents with    Follow-up     4 month check up     Doing pretty well    Has had dehydration, c dif  No diarrhea currently trying to stay hydrated    No results displayed because visit has over 200 results.           Past Medical History:   Diagnosis Date    Acute deep vein thrombosis (DVT) of non-extremity vein 5/20/2019    Anemia     Anxiety     Bipolar disorder (HCC)     Cancer (HCC)     Colostomy status (HCC)     Debility     Difficulty in walking, not elsewhere classified     Disorder of muscle     Displaced fracture of base of neck of right femur, subsequent encounter for closed fracture with routine healing     Dysphagia     Fall (on) (from) other stairs and steps, subsequent encounter     Family history of malignant neoplasm of gastrointestinal tract     mother colon/ovarian cancer 72    Fecal incontinence     LAR syndrome    John catheter in place 2022    patient stated- \"catheter due to them nicking his bladder and it won't heal\"    Former cigarette smoker     History of rectal cancer     Hydronephrosis with renal and ureteral calculous obstruction     Hypertension     Hypothyroid     stable w/med    Infection and inflammatory reaction due to other internal prosthetic devices, implants and grafts, subsequent encounter 2017    abd wound/mesh colostomy    Insomnia     takes meds    Intestinal adhesions     Major depression     Nausea and vomiting 12/31/2023    Need for assistance with personal care     Obstructive

## 2024-08-01 ENCOUNTER — HOSPITAL ENCOUNTER (EMERGENCY)
Age: 79
Discharge: HOME | End: 2024-08-02
Attending: EMERGENCY MEDICINE
Payer: MEDICARE

## 2024-08-01 DIAGNOSIS — R33.9 ACUTE ON CHRONIC URINARY RETENTION: Primary | ICD-10-CM

## 2024-08-01 DIAGNOSIS — T83.9XXA FOLEY CATHETER PROBLEM, INITIAL ENCOUNTER (HCC): ICD-10-CM

## 2024-08-01 PROCEDURE — 99283 EMERGENCY DEPT VISIT LOW MDM: CPT

## 2024-08-01 PROCEDURE — 51702 INSERT TEMP BLADDER CATH: CPT

## 2024-08-01 ASSESSMENT — ENCOUNTER SYMPTOMS
VOMITING: 0
EYE ITCHING: 0
NAUSEA: 0
BACK PAIN: 0
COUGH: 0
EYE REDNESS: 0
TROUBLE SWALLOWING: 0
ABDOMINAL PAIN: 1
SINUS PAIN: 0
EYE PAIN: 0
CONSTIPATION: 0
WHEEZING: 0
SHORTNESS OF BREATH: 0
DIARRHEA: 0

## 2024-08-01 ASSESSMENT — PAIN - FUNCTIONAL ASSESSMENT: PAIN_FUNCTIONAL_ASSESSMENT: 0-10

## 2024-08-01 ASSESSMENT — PAIN SCALES - GENERAL: PAINLEVEL_OUTOF10: 0

## 2024-08-02 ENCOUNTER — CARE COORDINATION (OUTPATIENT)
Dept: CARE COORDINATION | Facility: CLINIC | Age: 79
End: 2024-08-02

## 2024-08-02 VITALS
RESPIRATION RATE: 20 BRPM | SYSTOLIC BLOOD PRESSURE: 146 MMHG | TEMPERATURE: 97.6 F | BODY MASS INDEX: 16.97 KG/M2 | DIASTOLIC BLOOD PRESSURE: 77 MMHG | OXYGEN SATURATION: 96 % | WEIGHT: 112 LBS | HEART RATE: 88 BPM | HEIGHT: 68 IN

## 2024-08-02 RX ORDER — HYDROCODONE BITARTRATE AND ACETAMINOPHEN 7.5; 325 MG/1; MG/1
1 TABLET ORAL
Status: DISCONTINUED | OUTPATIENT
Start: 2024-08-02 | End: 2024-08-02 | Stop reason: HOSPADM

## 2024-08-02 NOTE — ED TRIAGE NOTES
Pt 77 y/o male arrives via [facility] em[facility] from home with a complaint of his catheter leaking from around the genitals.

## 2024-08-02 NOTE — DISCHARGE INSTRUCTIONS
Continue routine catheter care  Continue all your current medications  Drink plenty of fluids  Call your family doctor for recheck  Call your urologist for recheck and follow-up    Return to ER for any worsening symptoms or new problems which may arise

## 2024-08-02 NOTE — ED NOTES
John catheter flushed with 50cc sterile water. 110cc drained from leg bag.     Virginia Sherwood RN  08/02/24 7024

## 2024-08-02 NOTE — ED NOTES
Existing carr catheter removed. Replaced with new 16F 2 way carr catheter. 700cc urine drained into new bag. Replaced with leg bag. Tolerated well.     Virginia Sherwood RN  08/02/24 1084

## 2024-08-02 NOTE — CARE COORDINATION
Care Transitions Note    Follow Up Call     Attempted to reach patient for transitions of care follow up.  Unable to reach patient.      Outreach Attempts:   HIPAA compliant voicemail left for patient.     Care Summary Note: Unable to reach patient for follow up call since discharged from Rehab.    Follow Up Appointment:   Future Appointments         Provider Specialty Dept Phone    8/9/2024 9:00 AM [id number] INJECTION/CATH Urology [phone]    11/21/2024 10:00 AM Bucky Armenta, DO Internal Medicine [phone]            Plan for follow-up on next business day.  based on severity of symptoms and risk factors. Plan for next call: follow-up appointment-     Blanca Moreno LPN

## 2024-08-02 NOTE — ED PROVIDER NOTES
EXISTING ACCESS  10/4/2021    IR NEPHROSTOMY EXCHANGE CATHETER  2021    IR NEPHROSTOMY EXCHANGE CATHETER  11/3/2021    IR NEPHROSTOMY EXCHANGE CATHETER  10/28/2021    IR NEPHROSTOMY PERCUTANEOUS LEFT  2021    IR NEPHROSTOMY PERCUTANEOUS LEFT  2021    IR NEPHROSTOMY PERCUTANEOUS LEFT 2021 [facility]    IR REPLCE T CVC WO PORT SAME ACC  2019    IR TUBE CHANGE  2021    IR TUBE CHANGE  2021    IR TUBE CHANGE  2021    IR TUBE CHANGE  2021    IR TUNNELED CATHETER PLACEMENT GREATER THAN 5 YEARS  2019    IR TUNNELED CATHETER PLACEMENT GREATER THAN 5 YEARS  2019    IR TUNNELED CATHETER PLACEMENT GREATER THAN 5 YEARS 2019 [facility]    OTHER SURGICAL HISTORY Bilateral     cataracts      OTHER SURGICAL HISTORY  10/7/2013    Roberto---endorectal us    POLYPECTOMY      TOTAL COLECTOMY  2013    Roberto--lap LAR with coloproctostomy, mobilization splenic flexure, diverting loop ileostomy    TOTAL COLECTOMY Right 2014    for leak    VASCULAR SURGERY Left     single lumen port/subsequently removed        Social History     Socioeconomic History    Marital status: Single   Tobacco Use    Smoking status: Former     Current packs/day: 0.00     Average packs/day: 1 pack/day for 10.0 years (10.0 ttl pk-yrs)     Types: Cigarettes     Start date: 1953     Quit date: 1963     Years since quittin.7     Passive exposure: Current    Smokeless tobacco: Never   Substance and Sexual Activity    Alcohol use: Not Currently     Alcohol/week: 11.7 standard drinks of alcohol    Drug use: No     Social Determinants of Health     Financial Resource Strain: Low Risk  (2023)    Overall Financial Resource Strain (CARDIA)     Difficulty of Paying Living Expenses: Not hard at all   Food Insecurity: No Food Insecurity (2024)    Hunger Vital Sign     Worried About Running Out of Food in the Last Year: Never true     Ran Out of Food in the

## 2024-08-05 ENCOUNTER — CARE COORDINATION (OUTPATIENT)
Dept: CARE COORDINATION | Facility: CLINIC | Age: 79
End: 2024-08-05

## 2024-08-05 NOTE — CARE COORDINATION
Care Transitions Note    Follow Up Call     Attempted to reach patient for transitions of care follow up.  Unable to reach patient.      Outreach Attempts:   HIPAA compliant voicemail left for patient.     Care Summary Note: Unable to reach patient for ABDIEL follow up call. According to Epic notes patient presented to the ER on 8/1/2-24 for Catheter leakage . Patient was d/c home. Will attempt to contact patient next business day for ABDIEL follow up call [facility]     Follow Up Appointment:   Future Appointments         Provider Specialty Dept Phone    8/9/2024 9:00 AM [id number] INJECTION/CATH Urology [phone]    11/21/2024 10:00 AM Bucky Armenta,  Internal Medicine [phone]            Plan for follow-up on next business day.  based on severity of symptoms and risk factors. Plan for next call: symptom management-Catheter Care     Blanca Moreno LPN

## 2024-08-06 ENCOUNTER — CARE COORDINATION (OUTPATIENT)
Dept: CARE COORDINATION | Facility: CLINIC | Age: 79
End: 2024-08-06

## 2024-08-06 PROBLEM — E46 PROTEIN CALORIE MALNUTRITION (HCC): Status: ACTIVE | Noted: 2024-08-06

## 2024-08-06 NOTE — CARE COORDINATION
Care Transitions Note    Final Call     Attempted to reach patient for transitions of care follow up.  Unable to reach patient.      Outreach Attempts:   Multiple attempts to contact patient at phone numbers on file.     Patient closed (unable to reach patient) from the Care Transitions program on 8/6/2024.  Patient/family unable to reach    Handoff:   Patient was not referred to the ACM team due to unable to contact patient.      Care Summary Note: Multiple attempts to reach patient since d/c from Rehab. Left voice message and contact information.    Assessments:      Upcoming Appointments:    Future Appointments         Provider Specialty Dept Phone    8/9/2024 9:00 AM [id number] INJECTION/CATH Urology [phone]    11/21/2024 10:00 AM Bucky Armenta, DO Internal Medicine [phone]            Blanca Moreno LPN

## 2024-12-06 ENCOUNTER — TELEPHONE (OUTPATIENT)
Dept: UROLOGY | Age: 79
End: 2024-12-06

## 2024-12-06 NOTE — TELEPHONE ENCOUNTER
[employer] called to notify me that the patients medicare is no longer active.  Called the patient [phone] but the number has been changed.  Called [phone] and [facility] for a return call or the patient will be considered self pay (per ensemble).

## (undated) DEVICE — GUIDEWIRE UROLOGICAL STR 3 CM 0.038 INX150 CM DUAL-FLEX

## (undated) DEVICE — YANKAUER,BULB TIP,W/O VENT,RIGID,STERILE: Brand: MEDLINE

## (undated) DEVICE — Device

## (undated) DEVICE — STAPLER EXT SKIN 35 WIDE S STL STPL SQUEEZE HNDL VISISTAT

## (undated) DEVICE — 2000CC GUARDIAN II: Brand: GUARDIAN

## (undated) DEVICE — GEL US 20GM NONIRRITATING OVERWRAPPED FILE PCH TRNSMIT

## (undated) DEVICE — REM POLYHESIVE ADULT PATIENT RETURN ELECTRODE: Brand: VALLEYLAB

## (undated) DEVICE — CARDINAL HEALTH FLEXIBLE LIGHT HANDLE COVER: Brand: CARDINAL HEALTH

## (undated) DEVICE — SUTURE VCRL SZ 3-0 L18IN ABSRB UD L26MM SH 1/2 CIR J864D

## (undated) DEVICE — NEEDLE HYPO 25GA L1.5IN BLU POLYPR HUB S STL REG BVL STR

## (undated) DEVICE — SUTURE VCRL 2-0 L27IN ABSRB CT BRAID COAT UD J275H

## (undated) DEVICE — GOWN,REINF,POLY,ECL,PP SLV,XL: Brand: MEDLINE

## (undated) DEVICE — RESERVOIR,SUCTION,100CC,SILICONE: Brand: MEDLINE

## (undated) DEVICE — CATHETER F BLLN 5CC 16FR 2 W HYDRGEL COAT LESS TRAUM LUB

## (undated) DEVICE — SLIM BODY SKIN STAPLER: Brand: APPOSE ULC

## (undated) DEVICE — SOLUTION IRRIG 3000ML H2O STRL BAG

## (undated) DEVICE — DEVICE SECUREMENT AD W/ TRICOT ANCHR PD FOR F LTX SIL CATH

## (undated) DEVICE — SPONGE LAP 18X18IN STRL -- 5/PK

## (undated) DEVICE — TRAY CATH 16F DRN BG LTX -- CONVERT TO ITEM 363158

## (undated) DEVICE — PACK SURGICAL PROCEDURE KIT CYSTOSCOPY TOTE

## (undated) DEVICE — KENDALL SCD EXPRESS SLEEVES, KNEE LENGTH, MEDIUM: Brand: KENDALL SCD

## (undated) DEVICE — SOLUTION IRRIG 3000ML 0.9% SOD CHL FLX CONT 0797208] ICU MEDICAL INC]

## (undated) DEVICE — BINDER ABD M/L H12IN FOR 46-62IN WHT 4 SLD PNL DSGN HOOP

## (undated) DEVICE — SOLUTION IV STRL H2O 500 ML AQUALITE POUR BTL

## (undated) DEVICE — GDWIRE 3CM FLX-TIP 0.038X150CM -- BX/5 SENSOR

## (undated) DEVICE — SYR 5ML 1/5 GRAD LL NSAF LF --

## (undated) DEVICE — TRAY PREP DRY W/ PREM GLV 2 APPL 6 SPNG 2 UNDPD 1 OVERWRAP

## (undated) DEVICE — MAJOR GENERAL: Brand: MEDLINE INDUSTRIES, INC.

## (undated) DEVICE — RELOAD STPL H1.5X3.6XL60MM REG TISS BLU B FRM AUTO RET PIN

## (undated) DEVICE — SOLUTION IV 1000ML 0.9% SOD CHL

## (undated) DEVICE — PVC URETHRAL CATHETER: Brand: DOVER

## (undated) DEVICE — SUTURE PERMAHAND SZ 2-0 L12X18IN NONABSORBABLE BLK SILK A185H

## (undated) DEVICE — CATH URET 5FRX70CM W/OPN END -- BX/20

## (undated) DEVICE — STAPLER INT L75MM CUT LN L73MM STPL LN L77MM BLU B FRM 8

## (undated) DEVICE — CATHETER URET 5FR L70CM OPN END SGL LUMN INJ HUB FLEXIMA

## (undated) DEVICE — BAG,DRAINAGE,4L,A/R TOWER,LL,SLIDE TAP: Brand: MEDLINE

## (undated) DEVICE — CATHETER URET 5FR L70CM TIP 8FR OPN END CONE TIP INJ HUB

## (undated) DEVICE — STAPLER INT L55MM CUT LN L53MM STPL LN L57MM BLU B FRM 8

## (undated) DEVICE — CONNECTOR TBNG OD5-7MM O2 END DISP

## (undated) DEVICE — BLADE ELECTRODE: Brand: VALLEYLAB

## (undated) DEVICE — DRAPE TWL SURG 16X26IN BLU ORB04] ALLCARE INC]

## (undated) DEVICE — GAUZE,SPONGE,4"X4",16PLY,STRL,LF,10/TRAY: Brand: MEDLINE

## (undated) DEVICE — NEEDLE HYPO 23GA L1IN TURQ S STL HUB POLYPR SHLD REG BVL

## (undated) DEVICE — BUTTON SWITCH PENCIL BLADE ELECTRODE, HOLSTER: Brand: EDGE

## (undated) DEVICE — STAPLER INT L90MM DIA35MM TI STPL RELD DISPOSABLE DST SER TA

## (undated) DEVICE — SURGICAL PROCEDURE PACK BASIC ST FRANCIS

## (undated) DEVICE — NDL PRT INJ NSAF BLNT 18GX1.5 --

## (undated) DEVICE — SUTURE VCRL SZ 2-0 L27IN ABSRB VLT L26MM UR-6 5/8 CIR J602H

## (undated) DEVICE — SUTURE VCRL SZ 2-0 L27IN ABSRB UD L26MM SH 1/2 CIR J417H

## (undated) DEVICE — APPLIER LIG CLP M L11IN TI STR RNG HNDL FOR 20 CLP DISP

## (undated) DEVICE — CATHETER URETH 20FR BLLN 5CC F 2 W SPEC M OLV COUDE TIP

## (undated) DEVICE — SHEET, T, LAPAROTOMY, STERILE: Brand: MEDLINE

## (undated) DEVICE — HANDPIECE SET WITH COAXIAL HIGH FLOW TIP AND SUCTION TUBE: Brand: INTERPULSE

## (undated) DEVICE — SYRINGE IRRIG 60ML SFT PLIABLE BLB EZ TO GRP 1 HND USE W/

## (undated) DEVICE — KENDALL RADIOLUCENT FOAM MONITORING ELECTRODE RECTANGULAR SHAPE: Brand: KENDALL

## (undated) DEVICE — AMD ANTIMICROBIAL GAUZE SPONGES,12 PLY USP TYPE VII, 0.2% POLYHEXAMETHYLENE BIGUANIDE HCI (PHMB): Brand: CURITY

## (undated) DEVICE — PREP SKN CHLRAPRP APL 26ML STR --

## (undated) DEVICE — MINOR SPLIT GENERAL: Brand: MEDLINE INDUSTRIES, INC.

## (undated) DEVICE — BLADE ELECTRODE: Brand: EDGE

## (undated) DEVICE — LEGGINGS, PAIR, 31X48, STERILE: Brand: MEDLINE

## (undated) DEVICE — STAPLER SKIN SQ 30 ABSRB STPL DISP INSORB

## (undated) DEVICE — SUTURE PROL SZ 1 L30IN NONABSORBABLE BLU L40MM CT 1/2 CIR 8435H

## (undated) DEVICE — SUT ETHLN 3-0 18IN PS1 BLK --

## (undated) DEVICE — (D)STRIP SKN CLSR 0.5X4IN WHT --

## (undated) DEVICE — DRAPE IRRIG FLD WRM W44XL44IN W/ AORN STD PRTBL INTRATEMP

## (undated) DEVICE — DRAPE,TOP,102X53,STERILE: Brand: MEDLINE

## (undated) DEVICE — SUT CHRMC 3-0 27IN SH BRN --

## (undated) DEVICE — RELOAD STPL L55MM OPN H3.8MM CLS H1.5MM WIRE DIA0.2MM REG

## (undated) DEVICE — DRAPE,UNDERBUTTOCKS,PCH,STERILE: Brand: MEDLINE

## (undated) DEVICE — SUTURE PERMAHAND SZ 3-0 L30IN NONABSORBABLE BLK L26MM SH C017D

## (undated) DEVICE — MASTISOL ADHESIVE LIQ 2/3ML

## (undated) DEVICE — DRAIN SURG 3/4 W10MMXL20CM 100% SIL PERF FLAT HUBLESS

## (undated) DEVICE — CONTAINER PREFIL FRMLN 40ML --

## (undated) DEVICE — (D)PREP SKN CHLRAPRP APPL 26ML -- CONVERT TO ITEM 371833

## (undated) DEVICE — SUTURE PERMAHAND SZ 2-0 L18IN NONABSORBABLE BLK L26MM SH C012D

## (undated) DEVICE — ILLUMINATOR ELECTROCAUTERY RETRACTED L8IN EXT L11IN DYN 10PK

## (undated) DEVICE — INTENDED FOR TISSUE SEPARATION, AND OTHER PROCEDURES THAT REQUIRE A SHARP SURGICAL BLADE TO PUNCTURE OR CUT.: Brand: BARD-PARKER SAFETY BLADES SIZE 15, STERILE

## (undated) DEVICE — SUTURE PROL SZ 2-0 L18IN NONABSORBABLE BLU FS L26MM 3/8 CIR 8685H

## (undated) DEVICE — AMD ANTIMICROBIAL NON-ADHERENT PAD,0.2% POLYHEXAMETHYLENE BIGUANIDE HCI (PHMB): Brand: TELFA

## (undated) DEVICE — SUT PROL 0 30IN CT1 BLU --

## (undated) DEVICE — Z DISCONTINUED SUGG SUB 2622703 KIT OST L12IN FLNG DIA70MM ST TRNSPAR TWO PC MOLD

## (undated) DEVICE — SURGICAL PROCEDURE PACK TISS 3X5 IN ABSORBABLE SEPRAFILM

## (undated) DEVICE — SYR 3ML LL TIP 1/10ML GRAD --

## (undated) DEVICE — MEDI-VAC YANKAUER SUCTION HANDLE W/BULBOUS TIP: Brand: CARDINAL HEALTH

## (undated) DEVICE — GOWN,SIRUS,NONRNF,SETINSLV,2XL,18/CS: Brand: MEDLINE

## (undated) DEVICE — AGENT HEMSTAT W2XL14IN OXIDIZED REGENERATED CELOS ABSRB FOR

## (undated) DEVICE — GOWN,SIRUS,NONRNF,SETINSLV,XL,20/CS: Brand: MEDLINE

## (undated) DEVICE — INTENDED FOR TISSUE SEPARATION, AND OTHER PROCEDURES THAT REQUIRE A SHARP SURGICAL BLADE TO PUNCTURE OR CUT.: Brand: BARD-PARKER SAFETY BLADES SIZE 10, STERILE

## (undated) DEVICE — GARMENT,MEDLINE,DVT,INT,CALF,MED, GEN2: Brand: MEDLINE

## (undated) DEVICE — 1010 S-DRAPE TOWEL DRAPE 10/BX: Brand: STERI-DRAPE™

## (undated) DEVICE — DRESSING,GAUZE,XEROFORM,CURAD,5"X9",ST: Brand: CURAD

## (undated) DEVICE — 3M™ TEGADERM™ TRANSPARENT FILM DRESSING FRAME STYLE, 1629, 8 IN X 12 IN (20 CM X 30 CM), 10/CT 8CT/CASE: Brand: 3M™ TEGADERM™

## (undated) DEVICE — SUTURE VCRL SZ 3-0 L36IN ABSRB UD L36MM CT-1 1/2 CIR J944H

## (undated) DEVICE — RELOAD STPL L60MM REG TISS BLU TI FOR PROX LIN CUT DST SER

## (undated) DEVICE — STAPLER INT L60MM REG TISS BLU B FRM 8 FIRING 2 ROW AUTO

## (undated) DEVICE — CANNULA NSL ORAL AD FOR CAPNOFLEX CO2 O2 AIRLFE

## (undated) DEVICE — MICRODISSECTION NEEDLE STRAIGHT SLEEVE: Brand: COLORADO

## (undated) DEVICE — SNARE POLYP SM W13MMXL240CM SHTH DIA2.4MM OVL FLX DISP

## (undated) DEVICE — SUTURE ABSRB X-1 REV CUT 1/2 CIR 22MM UD BRAID 27IN SZ 3-0 J458H

## (undated) DEVICE — BLADE ASSEMB CLP HAIR FINE --

## (undated) DEVICE — SUTURE VCRL SZ 3-0 L18IN ABSRB UD W/O NDL POLYGLACTIN 910 J110T

## (undated) DEVICE — DRAIN SURG 19FR SIL RND HUBLESS RADPQ W/O TRCR BLAK

## (undated) DEVICE — SUTURE MCRYL SZ 2-0 L27IN ABSRB VLT CT-1 L36MM 1/2 CIR TAPR Y339H

## (undated) DEVICE — COLOSTOMY/ILEOSTOMY KIT,FLEXWEAR: Brand: NEW IMAGE

## (undated) DEVICE — DRAIN SURG 15FR SIL RND CHN W/ TRCR FULL FLUT DBL WRP TRAD

## (undated) DEVICE — SUTURE PERMAHAND SZ 3-0 L18IN NONABSORBABLE BLK SILK BRAID A184H

## (undated) DEVICE — NITINOL STONE RETRIEVAL DEVICE: Brand: DAKOTA

## (undated) DEVICE — SUTURE PERMAHAND SZ 3-0 L18IN NONABSORBABLE BLK L26MM SH C013D

## (undated) DEVICE — SUT SLK 2-0SH 30IN BLK --

## (undated) DEVICE — COVER,MAYO STAND,STERILE: Brand: MEDLINE

## (undated) DEVICE — SUTURE COAT VCRL SZ 4-0 L18IN ABSRB UD L19MM PS-2 1/2 CIR J496G

## (undated) DEVICE — SUT PROL 1 30IN CT1 BLU --

## (undated) DEVICE — SUTURE PDS II SZ 1 L96IN ABSRB VLT TP-1 L65MM 1/2 CIR Z880G